# Patient Record
Sex: FEMALE | Race: WHITE | HISPANIC OR LATINO | Employment: OTHER | URBAN - METROPOLITAN AREA
[De-identification: names, ages, dates, MRNs, and addresses within clinical notes are randomized per-mention and may not be internally consistent; named-entity substitution may affect disease eponyms.]

---

## 2017-01-11 ENCOUNTER — TRANSCRIBE ORDERS (OUTPATIENT)
Dept: LAB | Facility: HOSPITAL | Age: 77
End: 2017-01-11

## 2017-01-11 ENCOUNTER — APPOINTMENT (OUTPATIENT)
Dept: LAB | Facility: HOSPITAL | Age: 77
End: 2017-01-11
Attending: INTERNAL MEDICINE
Payer: COMMERCIAL

## 2017-01-11 DIAGNOSIS — Z51.81 ENCOUNTER FOR THERAPEUTIC DRUG MONITORING: ICD-10-CM

## 2017-01-11 DIAGNOSIS — Z51.81 ENCOUNTER FOR THERAPEUTIC DRUG MONITORING: Primary | ICD-10-CM

## 2017-01-11 DIAGNOSIS — E55.9 UNSPECIFIED VITAMIN D DEFICIENCY: ICD-10-CM

## 2017-01-11 LAB
25(OH)D3 SERPL-MCNC: 34.8 NG/ML (ref 30–100)
ALBUMIN SERPL BCP-MCNC: 3.9 G/DL (ref 3.5–5)
ALP SERPL-CCNC: 78 U/L (ref 46–116)
ALT SERPL W P-5'-P-CCNC: 16 U/L (ref 12–78)
ANION GAP SERPL CALCULATED.3IONS-SCNC: 4 MMOL/L (ref 4–13)
AST SERPL W P-5'-P-CCNC: 15 U/L (ref 5–45)
BACTERIA UR QL AUTO: ABNORMAL /HPF
BILIRUB SERPL-MCNC: 0.88 MG/DL (ref 0.2–1)
BILIRUB UR QL STRIP: NEGATIVE
BUN SERPL-MCNC: 21 MG/DL (ref 5–25)
CALCIUM SERPL-MCNC: 9.1 MG/DL (ref 8.3–10.1)
CHLORIDE SERPL-SCNC: 103 MMOL/L (ref 100–108)
CHOLEST SERPL-MCNC: 234 MG/DL (ref 50–200)
CLARITY UR: ABNORMAL
CO2 SERPL-SCNC: 31 MMOL/L (ref 21–32)
COLOR UR: ABNORMAL
CREAT SERPL-MCNC: 1.1 MG/DL (ref 0.6–1.3)
ERYTHROCYTE [DISTWIDTH] IN BLOOD BY AUTOMATED COUNT: 12.9 % (ref 11.6–15.1)
GFR SERPL CREATININE-BSD FRML MDRD: 48.3 ML/MIN/1.73SQ M
GLUCOSE SERPL-MCNC: 92 MG/DL (ref 65–140)
GLUCOSE UR STRIP-MCNC: NEGATIVE MG/DL
HCT VFR BLD AUTO: 41.3 % (ref 34.8–46.1)
HDLC SERPL-MCNC: 61 MG/DL (ref 40–60)
HGB BLD-MCNC: 13.6 G/DL (ref 11.5–15.4)
HGB UR QL STRIP.AUTO: NEGATIVE
KETONES UR STRIP-MCNC: NEGATIVE MG/DL
LDLC SERPL CALC-MCNC: 144 MG/DL (ref 0–100)
LEUKOCYTE ESTERASE UR QL STRIP: ABNORMAL
MAGNESIUM SERPL-MCNC: 1.8 MG/DL (ref 1.6–2.6)
MCH RBC QN AUTO: 31.1 PG (ref 26.8–34.3)
MCHC RBC AUTO-ENTMCNC: 32.9 G/DL (ref 31.4–37.4)
MCV RBC AUTO: 95 FL (ref 82–98)
NITRITE UR QL STRIP: POSITIVE
NON-SQ EPI CELLS URNS QL MICRO: ABNORMAL /HPF
PH UR STRIP.AUTO: 6 [PH] (ref 4.5–8)
PLATELET # BLD AUTO: 240 THOUSANDS/UL (ref 149–390)
PMV BLD AUTO: 11.4 FL (ref 8.9–12.7)
POTASSIUM SERPL-SCNC: 3.6 MMOL/L (ref 3.5–5.3)
PROT SERPL-MCNC: 8.3 G/DL (ref 6.4–8.2)
PROT UR STRIP-MCNC: NEGATIVE MG/DL
RBC # BLD AUTO: 4.37 MILLION/UL (ref 3.81–5.12)
RBC #/AREA URNS AUTO: ABNORMAL /HPF
SODIUM SERPL-SCNC: 138 MMOL/L (ref 136–145)
SP GR UR STRIP.AUTO: 1.02 (ref 1–1.03)
TRIGL SERPL-MCNC: 143 MG/DL
TSH SERPL DL<=0.05 MIU/L-ACNC: 2.57 UIU/ML (ref 0.36–3.74)
UROBILINOGEN UR QL STRIP.AUTO: 1 E.U./DL
WBC # BLD AUTO: 7.2 THOUSAND/UL (ref 4.31–10.16)
WBC #/AREA URNS AUTO: ABNORMAL /HPF

## 2017-01-11 PROCEDURE — 80053 COMPREHEN METABOLIC PANEL: CPT

## 2017-01-11 PROCEDURE — 36415 COLL VENOUS BLD VENIPUNCTURE: CPT

## 2017-01-11 PROCEDURE — 80061 LIPID PANEL: CPT

## 2017-01-11 PROCEDURE — 82306 VITAMIN D 25 HYDROXY: CPT

## 2017-01-11 PROCEDURE — 84443 ASSAY THYROID STIM HORMONE: CPT

## 2017-01-11 PROCEDURE — 83735 ASSAY OF MAGNESIUM: CPT

## 2017-01-11 PROCEDURE — 85027 COMPLETE CBC AUTOMATED: CPT

## 2017-01-11 PROCEDURE — 81001 URINALYSIS AUTO W/SCOPE: CPT | Performed by: INTERNAL MEDICINE

## 2017-01-17 ENCOUNTER — ALLSCRIPTS OFFICE VISIT (OUTPATIENT)
Dept: OTHER | Facility: OTHER | Age: 77
End: 2017-01-17

## 2017-01-20 ENCOUNTER — ALLSCRIPTS OFFICE VISIT (OUTPATIENT)
Dept: OTHER | Facility: OTHER | Age: 77
End: 2017-01-20

## 2017-02-14 ENCOUNTER — ALLSCRIPTS OFFICE VISIT (OUTPATIENT)
Dept: OTHER | Facility: OTHER | Age: 77
End: 2017-02-14

## 2017-03-02 ENCOUNTER — GENERIC CONVERSION - ENCOUNTER (OUTPATIENT)
Dept: OTHER | Facility: OTHER | Age: 77
End: 2017-03-02

## 2017-03-06 ENCOUNTER — GENERIC CONVERSION - ENCOUNTER (OUTPATIENT)
Dept: OTHER | Facility: OTHER | Age: 77
End: 2017-03-06

## 2017-03-13 ENCOUNTER — GENERIC CONVERSION - ENCOUNTER (OUTPATIENT)
Dept: OTHER | Facility: OTHER | Age: 77
End: 2017-03-13

## 2017-04-13 ENCOUNTER — ALLSCRIPTS OFFICE VISIT (OUTPATIENT)
Dept: OTHER | Facility: OTHER | Age: 77
End: 2017-04-13

## 2017-05-11 ENCOUNTER — GENERIC CONVERSION - ENCOUNTER (OUTPATIENT)
Dept: OTHER | Facility: OTHER | Age: 77
End: 2017-05-11

## 2017-05-17 ENCOUNTER — ALLSCRIPTS OFFICE VISIT (OUTPATIENT)
Dept: OTHER | Facility: OTHER | Age: 77
End: 2017-05-17

## 2017-05-25 ENCOUNTER — GENERIC CONVERSION - ENCOUNTER (OUTPATIENT)
Dept: OTHER | Facility: OTHER | Age: 77
End: 2017-05-25

## 2017-06-19 ENCOUNTER — ALLSCRIPTS OFFICE VISIT (OUTPATIENT)
Dept: OTHER | Facility: OTHER | Age: 77
End: 2017-06-19

## 2017-06-27 ENCOUNTER — ALLSCRIPTS OFFICE VISIT (OUTPATIENT)
Dept: OTHER | Facility: OTHER | Age: 77
End: 2017-06-27

## 2017-07-11 ENCOUNTER — TRANSCRIBE ORDERS (OUTPATIENT)
Dept: LAB | Facility: HOSPITAL | Age: 77
End: 2017-07-11

## 2017-07-11 ENCOUNTER — APPOINTMENT (OUTPATIENT)
Dept: LAB | Facility: HOSPITAL | Age: 77
End: 2017-07-11
Attending: INTERNAL MEDICINE
Payer: COMMERCIAL

## 2017-07-11 DIAGNOSIS — Z79.02 LONG TERM CURRENT USE OF ANTITHROMBOTICS/ANTIPLATELETS: ICD-10-CM

## 2017-07-11 DIAGNOSIS — I10 ESSENTIAL HYPERTENSION, BENIGN: ICD-10-CM

## 2017-07-11 DIAGNOSIS — E78.5 HYPERLIPIDEMIA, UNSPECIFIED HYPERLIPIDEMIA TYPE: ICD-10-CM

## 2017-07-11 DIAGNOSIS — I48.91 ATRIAL FIBRILLATION, UNSPECIFIED TYPE (HCC): ICD-10-CM

## 2017-07-11 DIAGNOSIS — Z86.79 HISTORY OF CHRONIC ATRIAL FIBRILLATION: ICD-10-CM

## 2017-07-11 DIAGNOSIS — Z86.79 HISTORY OF CHRONIC ATRIAL FIBRILLATION: Primary | ICD-10-CM

## 2017-07-11 LAB
25(OH)D3 SERPL-MCNC: 34.8 NG/ML (ref 30–100)
ALBUMIN SERPL BCP-MCNC: 3.6 G/DL (ref 3.5–5)
ALP SERPL-CCNC: 69 U/L (ref 46–116)
ALT SERPL W P-5'-P-CCNC: 14 U/L (ref 12–78)
ANION GAP SERPL CALCULATED.3IONS-SCNC: 5 MMOL/L (ref 4–13)
AST SERPL W P-5'-P-CCNC: 14 U/L (ref 5–45)
BASOPHILS # BLD AUTO: 0.03 THOUSANDS/ΜL (ref 0–0.1)
BASOPHILS NFR BLD AUTO: 0 % (ref 0–1)
BILIRUB SERPL-MCNC: 0.53 MG/DL (ref 0.2–1)
BUN SERPL-MCNC: 23 MG/DL (ref 5–25)
CALCIUM SERPL-MCNC: 9 MG/DL (ref 8.3–10.1)
CHLORIDE SERPL-SCNC: 105 MMOL/L (ref 100–108)
CHOLEST SERPL-MCNC: 209 MG/DL (ref 50–200)
CO2 SERPL-SCNC: 29 MMOL/L (ref 21–32)
CREAT SERPL-MCNC: 1.09 MG/DL (ref 0.6–1.3)
CREAT UR-MCNC: 196 MG/DL
EOSINOPHIL # BLD AUTO: 0.08 THOUSAND/ΜL (ref 0–0.61)
EOSINOPHIL NFR BLD AUTO: 1 % (ref 0–6)
ERYTHROCYTE [DISTWIDTH] IN BLOOD BY AUTOMATED COUNT: 13.1 % (ref 11.6–15.1)
GFR SERPL CREATININE-BSD FRML MDRD: 48.8 ML/MIN/1.73SQ M
GLUCOSE P FAST SERPL-MCNC: 83 MG/DL (ref 65–99)
HCT VFR BLD AUTO: 39.2 % (ref 34.8–46.1)
HDLC SERPL-MCNC: 55 MG/DL (ref 40–60)
HGB BLD-MCNC: 12.7 G/DL (ref 11.5–15.4)
LDLC SERPL CALC-MCNC: 133 MG/DL (ref 0–100)
LYMPHOCYTES # BLD AUTO: 1.72 THOUSANDS/ΜL (ref 0.6–4.47)
LYMPHOCYTES NFR BLD AUTO: 25 % (ref 14–44)
MCH RBC QN AUTO: 30.8 PG (ref 26.8–34.3)
MCHC RBC AUTO-ENTMCNC: 32.4 G/DL (ref 31.4–37.4)
MCV RBC AUTO: 95 FL (ref 82–98)
MICROALBUMIN UR-MCNC: 11.1 MG/L (ref 0–20)
MICROALBUMIN/CREAT 24H UR: 6 MG/G CREATININE (ref 0–30)
MONOCYTES # BLD AUTO: 0.53 THOUSAND/ΜL (ref 0.17–1.22)
MONOCYTES NFR BLD AUTO: 8 % (ref 4–12)
NEUTROPHILS # BLD AUTO: 4.52 THOUSANDS/ΜL (ref 1.85–7.62)
NEUTS SEG NFR BLD AUTO: 66 % (ref 43–75)
NRBC BLD AUTO-RTO: 0 /100 WBCS
PLATELET # BLD AUTO: 214 THOUSANDS/UL (ref 149–390)
PMV BLD AUTO: 11.6 FL (ref 8.9–12.7)
POTASSIUM SERPL-SCNC: 3.9 MMOL/L (ref 3.5–5.3)
PROT SERPL-MCNC: 7.7 G/DL (ref 6.4–8.2)
RBC # BLD AUTO: 4.12 MILLION/UL (ref 3.81–5.12)
SODIUM SERPL-SCNC: 139 MMOL/L (ref 136–145)
TRIGL SERPL-MCNC: 103 MG/DL
TSH SERPL DL<=0.05 MIU/L-ACNC: 2.15 UIU/ML (ref 0.36–3.74)
WBC # BLD AUTO: 6.89 THOUSAND/UL (ref 4.31–10.16)

## 2017-07-11 PROCEDURE — 85025 COMPLETE CBC W/AUTO DIFF WBC: CPT

## 2017-07-11 PROCEDURE — 82306 VITAMIN D 25 HYDROXY: CPT

## 2017-07-11 PROCEDURE — 80061 LIPID PANEL: CPT

## 2017-07-11 PROCEDURE — 80053 COMPREHEN METABOLIC PANEL: CPT

## 2017-07-11 PROCEDURE — 36415 COLL VENOUS BLD VENIPUNCTURE: CPT

## 2017-07-11 PROCEDURE — 81001 URINALYSIS AUTO W/SCOPE: CPT | Performed by: INTERNAL MEDICINE

## 2017-07-11 PROCEDURE — 82570 ASSAY OF URINE CREATININE: CPT | Performed by: INTERNAL MEDICINE

## 2017-07-11 PROCEDURE — 82043 UR ALBUMIN QUANTITATIVE: CPT | Performed by: INTERNAL MEDICINE

## 2017-07-11 PROCEDURE — 84443 ASSAY THYROID STIM HORMONE: CPT

## 2017-07-12 LAB
BACTERIA UR QL AUTO: ABNORMAL /HPF
BILIRUB UR QL STRIP: NEGATIVE
CLARITY UR: ABNORMAL
COLOR UR: YELLOW
GLUCOSE UR STRIP-MCNC: NEGATIVE MG/DL
HGB UR QL STRIP.AUTO: NEGATIVE
HYALINE CASTS #/AREA URNS LPF: ABNORMAL /LPF
KETONES UR STRIP-MCNC: NEGATIVE MG/DL
LEUKOCYTE ESTERASE UR QL STRIP: ABNORMAL
NITRITE UR QL STRIP: NEGATIVE
NON-SQ EPI CELLS URNS QL MICRO: ABNORMAL /HPF
PH UR STRIP.AUTO: 7.5 [PH] (ref 4.5–8)
PROT UR STRIP-MCNC: NEGATIVE MG/DL
RBC #/AREA URNS AUTO: ABNORMAL /HPF
SP GR UR STRIP.AUTO: 1.02 (ref 1–1.03)
UROBILINOGEN UR QL STRIP.AUTO: 1 E.U./DL
WBC #/AREA URNS AUTO: ABNORMAL /HPF

## 2017-07-18 ENCOUNTER — ALLSCRIPTS OFFICE VISIT (OUTPATIENT)
Dept: OTHER | Facility: OTHER | Age: 77
End: 2017-07-18

## 2017-08-21 ENCOUNTER — ALLSCRIPTS OFFICE VISIT (OUTPATIENT)
Dept: OTHER | Facility: OTHER | Age: 77
End: 2017-08-21

## 2017-09-19 ENCOUNTER — ALLSCRIPTS OFFICE VISIT (OUTPATIENT)
Dept: OTHER | Facility: OTHER | Age: 77
End: 2017-09-19

## 2017-09-21 ENCOUNTER — ALLSCRIPTS OFFICE VISIT (OUTPATIENT)
Dept: OTHER | Facility: OTHER | Age: 77
End: 2017-09-21

## 2017-09-28 ENCOUNTER — TRANSCRIBE ORDERS (OUTPATIENT)
Dept: ADMINISTRATIVE | Facility: HOSPITAL | Age: 77
End: 2017-09-28

## 2017-09-28 DIAGNOSIS — Z12.31 VISIT FOR SCREENING MAMMOGRAM: Primary | ICD-10-CM

## 2017-10-25 ENCOUNTER — GENERIC CONVERSION - ENCOUNTER (OUTPATIENT)
Dept: OTHER | Facility: OTHER | Age: 77
End: 2017-10-25

## 2017-10-25 DIAGNOSIS — Z12.31 ENCOUNTER FOR SCREENING MAMMOGRAM FOR MALIGNANT NEOPLASM OF BREAST: ICD-10-CM

## 2017-11-27 ENCOUNTER — GENERIC CONVERSION - ENCOUNTER (OUTPATIENT)
Dept: OTHER | Facility: OTHER | Age: 77
End: 2017-11-27

## 2017-12-06 ENCOUNTER — ALLSCRIPTS OFFICE VISIT (OUTPATIENT)
Dept: OTHER | Facility: OTHER | Age: 77
End: 2017-12-06

## 2017-12-06 ENCOUNTER — GENERIC CONVERSION - ENCOUNTER (OUTPATIENT)
Dept: OTHER | Facility: OTHER | Age: 77
End: 2017-12-06

## 2017-12-06 ENCOUNTER — HOSPITAL ENCOUNTER (OUTPATIENT)
Dept: RADIOLOGY | Facility: HOSPITAL | Age: 77
Discharge: HOME | End: 2017-12-06
Payer: COMMERCIAL

## 2017-12-06 DIAGNOSIS — Z12.31 ENCOUNTER FOR SCREENING MAMMOGRAM FOR MALIGNANT NEOPLASM OF BREAST: ICD-10-CM

## 2017-12-06 PROCEDURE — G0202 SCR MAMMO BI INCL CAD: HCPCS

## 2017-12-07 NOTE — PROGRESS NOTES
Assessment    1  Nausea with vomiting (787 01) (R11 2)    Plan  Chronic atrial fibrillation    · Xarelto 15 MG Oral Tablet; Take 1 tablet daily  PMH: Viral bronchitis    · Benzonatate 100 MG Oral Capsule; TAKE 1 CAPSULE 3 TIMES DAILY ASNEEDED    Discussion/Summary    Nausea with vomitingUnclear ddx at this point, but differential could include psychogenic vs IBS vs obstruction  Recommend EGD to rule out mass/stricture if this does not improvePt had colonoscopy recentlyRecommend eating soft foods in the meantime  Can take food diary; also try taking Protonix in the evening  Follow up if symptoms persist or worsen  The patient was counseled regarding impressions  The treatment plan was reviewed with the patient/guardian  The patient/guardian understands and agrees with the treatment plan      Chief Complaint  Nausea and vomiting      History of Present Illness  HPI: Patient is here for new complaint of chronic diarrhea and vomiting  Patient states that since October, she has been having occasional episodes in the morning where she will wake up, usually around 2 or 3 am, and is very nauseous  She then will vomit up clear liquid, sometimes several times  She'll feel improved after vomiting, but will also have loose stools afterward, sometimes for hours  Had one episode in October, and then another in November  Complains of having a lot of dry mouth  Has frequent cough, but says this is not the cause of her vomiting  Also says that when she has these symptoms, her skin will feel cold, though she won't feel cold herself  No new foods or medicines  No constipation  No blood in the stool or vomit  No stomach pain  No fevers or chills  No sick contacts  This is a new issue for her  No known hx of gastric or esophageal cancer  Says she may have had an EGD before, but this was many years ago  Hospital Based Practices Required Assessment:  Pain Assessment  the patient states they do not have pain   (on a scale of 0 to 10, the patient rates the pain at 0 )   Prefered Language is  Italian  Primary Language is  Italian  Review of Systems   Constitutional: No fever, no chills, feels well, no tiredness, no recent weight gain or loss  Respiratory: cough, but-- as noted in HPI  Gastrointestinal: nausea-- and-- vomiting, but-- as noted in HPI,-- no abdominal pain,-- no constipation-- and-- no blood in stools  Genitourinary: no complaints of dysuria, no incontinence, no pelvic pain, no dysmenorrhea, no vaginal discharge or abnormal vaginal bleeding  Active Problems    1  Allergic rhinitis due to pollen (477 0) (J30 1)   2  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)   3  Chronic atrial fibrillation (427 31) (I48 2)   4  Chronic kidney disease, stage 3 (585 3) (N18 3)   5  Cystocele, midline (618 01) (N81 11)   6  Encounter for Holly catheter removal (V53 6) (Z46 6)   7  Encounter for screening mammogram for breast cancer (V76 12) (Z12 31)   8  Fatigue (780 79) (R53 83)   9  GERD without esophagitis (530 81) (K21 9)   10  Hallux abducto valgus, bilateral (735 0) (M20 11,M20 12)   11  History of atrial fibrillation (V12 59) (Z86 79)   12  Hyperlipidemia (272 4) (E78 5)   13  Hypertension (401 9) (I10)   14  Lateral epicondylitis, right elbow (726 32) (M77 11)   15  Left atrial enlargement (429 3) (I51 7)   16  Left renal artery stenosis (440 1) (I70 1)   17  Lipoma of right upper extremity (214 8) (D17 21)   18  Low back pain (724 2) (M54 5)   19  Lump or mass in breast (611 72) (N63 0)   20  Medicare annual wellness visit, initial (V70 0) (Z00 00)   21  Need for prophylactic vaccination and inoculation against influenza (V04 81) (Z23)   22  Need for vaccination with 13-polyvalent pneumococcal conjugate vaccine (V03 82) (Z23)   23  Onychomycosis of toenail (110 1) (B35 1)   24  Osteoarthritis of hip (715 95) (M16 9)   25  Osteoarthrosis, hand (484 94) (M19 049)   26  Osteopenia (423 90) (M12 80)   27   Pain due to onychomycosis of toenails of both feet (110 1,729 5)  (B35 1,M79 675,M79 674)   28  Pernicious anemia (281 0) (D51 0)   29  Right knee pain (719 46) (M25 561)   30  Seasonal affective disorder (296 99) (F33 9)   31  Shortness of breath (786 05) (R06 02)   32  Skin pustule (686 9) (L08 9)   33  Tubulovillous adenoma (229 9) (D36 9)   34  Vaginal wall prolapse (618 00) (N81 10)   35  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)   36  Vitamin B12 deficiency (266 2) (E53 8)    Social History     · Denied: History of Alcohol use   · Denied: History of Drug use   · Housing, household, and economic circumstances (V60 9) (Z59 9)   · Safe   · Never A Smoker  The social history was reviewed and updated today  The social history was reviewed and is unchanged  Current Meds   1  Benicar 40 MG Oral Tablet; TAKE 1 TABLET DAILY; Therapy: 04Dst1204 to (Last Rx:53Hse9801)  Requested for: 95XDM7550 Ordered   2  Benzonatate 100 MG Oral Capsule; TAKE 1 CAPSULE 3 TIMES DAILY AS NEEDED; Therapy: 74RQT4915 to (Evaluate:49Gqq4722)  Requested for: 40Pxb0836; Last Rx:29Onl8818 Ordered   3  Bystolic 5 MG Oral Tablet; Therapy: (Recorded:71Cri5228) to Recorded   4  Calcium 500 +D 500-400 MG-UNIT Oral Tablet; Therapy: (QLSSOKWZ:57EPW1079) to Recorded   5  Ciclopirox 8 % External Solution; APPLY AND GENTLY MASSAGE INTO AFFECTED AREA(S) TWICE DAILY; Therapy: 21UKQ0110 to (Last Rx:31Yzn8176) Ordered   6  Cyanocobalamin 1000 MCG/ML Injection Solution; INJECT 1 ML INTRAMUSCULARLY ONCE A MONTH  Requested for: 02Jis6084; Last Rx:06Vny0401 Ordered   7  Fluticasone Propionate 50 MCG/ACT Nasal Suspension; instill 2 sprays into each nostril once daily; Therapy: 26HCM2438 to (Evaluate:82Soq9597)  Requested for: 38Yka0002; Last Rx:74Uyt1915 Ordered   8  Indapamide 2 5 MG Oral Tablet; TAKE 1 TABLET DAILY; Therapy: 20ZPJ6335 to (Evaluate:73Hgz5668); Last Rx:19Sep2017 Ordered   9  Magnesium Oxide 400 MG Oral Capsule; Take 2 daily;  Therapy: 04UKW8946 to (Last Rx:25Cbv9680)  Requested for: 90FOJ2406 Ordered   10  Magnesium Oxide 400 MG Oral Capsule; TAKE AS DIRECTED; Therapy: 26DGN7931 to (Last Rx:30Mjc0006) Ordered   11  Pantoprazole Sodium 40 MG Oral Tablet Delayed Release; TAKE 1 TABLET BY MOUTH  ONCE DAILY; Therapy: 95TQG2424 to (Evaluate:15Srr2964)  Requested for: 48MLN0603; Last  Rx:19Pfr7254 Ordered   12  Premarin 0 625 MG/GM Vaginal Cream; Aplique un poco de crema en el dedo y eche en  la vagina todas las noches por 2 semanas y despues wilver noche si y Aldean Og no  No use  aplicador; Therapy: 85ZRC2667 to (Last Rx:21Luw5984) Ordered   13  Spironolactone 25 MG Oral Tablet; Therapy: 46Njz7696 to (Last Colt Mark)  Requested for: 78Vur8423 Ordered   14  Verapamil HCl - 120 MG Oral Tablet; 1 tab po qod; Therapy: 06EFB5496 to (Last Rx:28Rqz7520) Ordered   15  Xarelto 15 MG Oral Tablet; Take 1 tablet daily; Therapy: 28NIL9021 to (Evaluate:10Nov2017)  Requested for: 51AYQ6634; Last  Rx:81Idf1675 Ordered   16  ZyrTEC Allergy 10 MG Oral Capsule; take 1 capsule daily; Therapy: 95DQS4327 to (Evaluate:14Oct2017)  Requested for: 91AOJ8807; Last  Rx:31Bbg6155 Ordered    Allergies  1  No Known Drug Allergies  2  Adhesive Tape   3  No Known Environmental Allergies    Vitals   Recorded: 53DGW4896 02:59PM   Temperature 97 8 F   Heart Rate 92   Systolic 788   Diastolic 70   Height 5 ft 2 in   Weight 136 lb 10 94 oz   BMI Calculated 25   BSA Calculated 1 63       Physical Exam   Constitutional  General appearance: No acute distress, well appearing and well nourished  Pulmonary  Respiratory effort: No increased work of breathing or signs of respiratory distress  Auscultation of lungs: Clear to auscultation  Cardiovascular  Auscultation of heart: Normal rate and rhythm, normal S1 and S2, without murmurs  Abdomen  Abdomen: Non-tender, no masses  Liver and spleen: No hepatomegaly or splenomegaly           Attending Note  Attending Note: Attending Note: I discussed the case with the Resident and reviewed the Resident's note,-- I supervised the Resident-- and-- I agree with the Resident management plan as it was presented to me  Level of Participation: I was present in clinic, but did not examine the patient  I agree with the Resident's note  Future Appointments    Date/Time Provider Specialty Site   12/26/2017 10:00 AM [place], PCP Nurse Schedule  [facility] [address] PCP       Signatures   Electronically signed by : Mesha Alegria DO; Dec  6 2017  5:17PM EST                       (Author)    Electronically signed by :  KARLI Sepulveda ; Dec  7 2017 12:51AM EST                       (Co-author)

## 2017-12-08 ENCOUNTER — APPOINTMENT (EMERGENCY)
Dept: RADIOLOGY | Facility: HOSPITAL | Age: 77
End: 2017-12-08
Payer: COMMERCIAL

## 2017-12-08 ENCOUNTER — HOSPITAL ENCOUNTER (OUTPATIENT)
Facility: HOSPITAL | Age: 77
Setting detail: OBSERVATION
Discharge: HOME | End: 2017-12-09
Attending: EMERGENCY MEDICINE | Admitting: INTERNAL MEDICINE
Payer: COMMERCIAL

## 2017-12-08 DIAGNOSIS — R07.9 LEFT SIDED CHEST PAIN: Primary | ICD-10-CM

## 2017-12-08 PROBLEM — I10 ESSENTIAL HYPERTENSION: Chronic | Status: ACTIVE | Noted: 2017-12-08

## 2017-12-08 PROBLEM — K21.9 GERD (GASTROESOPHAGEAL REFLUX DISEASE): Chronic | Status: ACTIVE | Noted: 2017-12-08

## 2017-12-08 PROBLEM — N18.30 STAGE 3 CHRONIC KIDNEY DISEASE (HCC): Chronic | Status: ACTIVE | Noted: 2017-12-08

## 2017-12-08 PROBLEM — R11.2 INTRACTABLE VOMITING WITH NAUSEA: Status: ACTIVE | Noted: 2017-12-08

## 2017-12-08 PROBLEM — M19.90 OSTEOARTHRITIS: Chronic | Status: ACTIVE | Noted: 2017-12-08

## 2017-12-08 PROBLEM — E78.5 HYPERLIPEMIA: Chronic | Status: ACTIVE | Noted: 2017-12-08

## 2017-12-08 PROBLEM — D64.9 ANEMIA: Chronic | Status: ACTIVE | Noted: 2017-12-08

## 2017-12-08 PROBLEM — I48.91 ATRIAL FIBRILLATION (HCC): Chronic | Status: ACTIVE | Noted: 2017-12-08

## 2017-12-08 PROBLEM — D51.0 PERNICIOUS ANEMIA: Chronic | Status: ACTIVE | Noted: 2017-12-08

## 2017-12-08 LAB
ALBUMIN SERPL BCP-MCNC: 4.2 G/DL (ref 3.5–5)
ALP SERPL-CCNC: 69 U/L (ref 46–116)
ALT SERPL W P-5'-P-CCNC: 16 U/L (ref 12–78)
ANION GAP SERPL CALCULATED.3IONS-SCNC: 7 MMOL/L (ref 4–13)
AST SERPL W P-5'-P-CCNC: 19 U/L (ref 5–45)
BASOPHILS # BLD AUTO: 0.03 THOUSANDS/ΜL (ref 0–0.1)
BASOPHILS NFR BLD AUTO: 0 % (ref 0–1)
BILIRUB SERPL-MCNC: 0.7 MG/DL (ref 0.2–1)
BUN SERPL-MCNC: 17 MG/DL (ref 5–25)
CALCIUM SERPL-MCNC: 9.7 MG/DL (ref 8.3–10.1)
CHLORIDE SERPL-SCNC: 103 MMOL/L (ref 100–108)
CO2 SERPL-SCNC: 27 MMOL/L (ref 21–32)
CREAT SERPL-MCNC: 1.26 MG/DL (ref 0.6–1.3)
EOSINOPHIL # BLD AUTO: 0.07 THOUSAND/ΜL (ref 0–0.61)
EOSINOPHIL NFR BLD AUTO: 1 % (ref 0–6)
ERYTHROCYTE [DISTWIDTH] IN BLOOD BY AUTOMATED COUNT: 12.8 % (ref 11.6–15.1)
GFR SERPL CREATININE-BSD FRML MDRD: 41 ML/MIN/1.73SQ M
GLUCOSE SERPL-MCNC: 86 MG/DL (ref 65–140)
HCT VFR BLD AUTO: 40.5 % (ref 34.8–46.1)
HGB BLD-MCNC: 13.6 G/DL (ref 11.5–15.4)
LYMPHOCYTES # BLD AUTO: 1.98 THOUSANDS/ΜL (ref 0.6–4.47)
LYMPHOCYTES NFR BLD AUTO: 26 % (ref 14–44)
MCH RBC QN AUTO: 31.5 PG (ref 26.8–34.3)
MCHC RBC AUTO-ENTMCNC: 33.6 G/DL (ref 31.4–37.4)
MCV RBC AUTO: 94 FL (ref 82–98)
MONOCYTES # BLD AUTO: 0.64 THOUSAND/ΜL (ref 0.17–1.22)
MONOCYTES NFR BLD AUTO: 8 % (ref 4–12)
NEUTROPHILS # BLD AUTO: 4.94 THOUSANDS/ΜL (ref 1.85–7.62)
NEUTS SEG NFR BLD AUTO: 65 % (ref 43–75)
NRBC BLD AUTO-RTO: 0 /100 WBCS
PLATELET # BLD AUTO: 274 THOUSANDS/UL (ref 149–390)
PMV BLD AUTO: 10.7 FL (ref 8.9–12.7)
POTASSIUM SERPL-SCNC: 3.4 MMOL/L (ref 3.5–5.3)
PROT SERPL-MCNC: 8.8 G/DL (ref 6.4–8.2)
RBC # BLD AUTO: 4.32 MILLION/UL (ref 3.81–5.12)
SODIUM SERPL-SCNC: 137 MMOL/L (ref 136–145)
TROPONIN I SERPL-MCNC: 0.05 NG/ML
TROPONIN I SERPL-MCNC: <0.02 NG/ML
WBC # BLD AUTO: 7.68 THOUSAND/UL (ref 4.31–10.16)

## 2017-12-08 PROCEDURE — 85025 COMPLETE CBC W/AUTO DIFF WBC: CPT | Performed by: EMERGENCY MEDICINE

## 2017-12-08 PROCEDURE — 80053 COMPREHEN METABOLIC PANEL: CPT | Performed by: EMERGENCY MEDICINE

## 2017-12-08 PROCEDURE — 93005 ELECTROCARDIOGRAM TRACING: CPT | Performed by: EMERGENCY MEDICINE

## 2017-12-08 PROCEDURE — 84484 ASSAY OF TROPONIN QUANT: CPT | Performed by: EMERGENCY MEDICINE

## 2017-12-08 PROCEDURE — 36415 COLL VENOUS BLD VENIPUNCTURE: CPT

## 2017-12-08 PROCEDURE — 84484 ASSAY OF TROPONIN QUANT: CPT | Performed by: INTERNAL MEDICINE

## 2017-12-08 PROCEDURE — 71020 HB CHEST X-RAY 2VW FRONTAL&LATL: CPT

## 2017-12-08 PROCEDURE — 99285 EMERGENCY DEPT VISIT HI MDM: CPT

## 2017-12-08 RX ORDER — LIDOCAINE 50 MG/G
1 PATCH TOPICAL DAILY
Status: DISCONTINUED | OUTPATIENT
Start: 2017-12-09 | End: 2017-12-09 | Stop reason: HOSPADM

## 2017-12-08 RX ORDER — INDAPAMIDE 2.5 MG/1
2.5 TABLET, FILM COATED ORAL EVERY MORNING
Status: DISCONTINUED | OUTPATIENT
Start: 2017-12-09 | End: 2017-12-09 | Stop reason: HOSPADM

## 2017-12-08 RX ORDER — SPIRONOLACTONE 25 MG/1
25 TABLET ORAL DAILY
Status: DISCONTINUED | OUTPATIENT
Start: 2017-12-09 | End: 2017-12-09 | Stop reason: HOSPADM

## 2017-12-08 RX ORDER — OLMESARTAN MEDOXOMIL 20 MG/1
40 TABLET ORAL
Status: DISCONTINUED | OUTPATIENT
Start: 2017-12-09 | End: 2017-12-09 | Stop reason: HOSPADM

## 2017-12-08 RX ORDER — POTASSIUM CHLORIDE 20 MEQ/1
40 TABLET, EXTENDED RELEASE ORAL ONCE
Status: COMPLETED | OUTPATIENT
Start: 2017-12-08 | End: 2017-12-08

## 2017-12-08 RX ORDER — FLUTICASONE PROPIONATE 50 MCG
2 SPRAY, SUSPENSION (ML) NASAL DAILY
Status: DISCONTINUED | OUTPATIENT
Start: 2017-12-09 | End: 2017-12-09 | Stop reason: HOSPADM

## 2017-12-08 RX ORDER — ACETAMINOPHEN 325 MG/1
650 TABLET ORAL EVERY 6 HOURS PRN
Status: DISCONTINUED | OUTPATIENT
Start: 2017-12-08 | End: 2017-12-09 | Stop reason: HOSPADM

## 2017-12-08 RX ORDER — NITROGLYCERIN 0.4 MG/1
0.4 TABLET SUBLINGUAL ONCE
Status: COMPLETED | OUTPATIENT
Start: 2017-12-08 | End: 2017-12-08

## 2017-12-08 RX ORDER — MULTIVIT-MIN/IRON/FOLIC ACID/K 18-600-40
CAPSULE ORAL
Status: ON HOLD | COMMUNITY
End: 2018-05-22 | Stop reason: ALTCHOICE

## 2017-12-08 RX ORDER — ONDANSETRON 2 MG/ML
4 INJECTION INTRAMUSCULAR; INTRAVENOUS EVERY 6 HOURS PRN
Status: DISCONTINUED | OUTPATIENT
Start: 2017-12-08 | End: 2017-12-09 | Stop reason: HOSPADM

## 2017-12-08 RX ORDER — PANTOPRAZOLE SODIUM 40 MG/1
40 TABLET, DELAYED RELEASE ORAL
Status: DISCONTINUED | OUTPATIENT
Start: 2017-12-08 | End: 2017-12-09 | Stop reason: HOSPADM

## 2017-12-08 RX ORDER — FLUTICASONE PROPIONATE 50 MCG
2 SPRAY, SUSPENSION (ML) NASAL DAILY
COMMUNITY
End: 2018-02-21 | Stop reason: ALTCHOICE

## 2017-12-08 RX ORDER — NEBIVOLOL 5 MG/1
5 TABLET ORAL DAILY
Status: DISCONTINUED | OUTPATIENT
Start: 2017-12-09 | End: 2017-12-09 | Stop reason: HOSPADM

## 2017-12-08 RX ORDER — CETIRIZINE HYDROCHLORIDE 10 MG/1
10 TABLET ORAL DAILY
Status: ON HOLD | COMMUNITY
End: 2018-04-16

## 2017-12-08 RX ORDER — VERAPAMIL HYDROCHLORIDE 120 MG/1
120 TABLET, FILM COATED ORAL EVERY OTHER DAY
Status: DISCONTINUED | OUTPATIENT
Start: 2017-12-09 | End: 2017-12-09 | Stop reason: HOSPADM

## 2017-12-08 RX ORDER — LIDOCAINE 50 MG/G
1 PATCH TOPICAL DAILY
Status: DISCONTINUED | OUTPATIENT
Start: 2017-12-09 | End: 2017-12-08 | Stop reason: CLARIF

## 2017-12-08 RX ORDER — ASPIRIN 81 MG/1
324 TABLET, CHEWABLE ORAL ONCE
Status: COMPLETED | OUTPATIENT
Start: 2017-12-08 | End: 2017-12-08

## 2017-12-08 RX ADMIN — NITROGLYCERIN 0.4 MG: 0.4 TABLET SUBLINGUAL at 16:45

## 2017-12-08 RX ADMIN — POTASSIUM CHLORIDE 40 MEQ: 1500 TABLET, EXTENDED RELEASE ORAL at 23:02

## 2017-12-08 RX ADMIN — ASPIRIN 81 MG 324 MG: 81 TABLET ORAL at 16:45

## 2017-12-08 NOTE — CONSULTS
Pt was called in to [facility] to see pt   She reported to f/u with Dr Samir Salguero and Dr Daylni Ackerman at [address] [place] [address]  Called and d/w SOD medicine on call team - they will see patient

## 2017-12-08 NOTE — ED ATTENDING ATTESTATION
Papito Blevins MD, saw and evaluated the patient  I have discussed the patient with the resident/non-physician practitioner and agree with the resident's/non-physician practitioner's findings, Plan of Care, and MDM as documented in the resident's/non-physician practitioner's note, except where noted  All available labs and Radiology studies were reviewed  At this point I agree with the current assessment done in the Emergency Department  I have conducted an independent evaluation of this patient a history and physical is as follows:      Critical Care Time  CritCare Time    67 yo female with hx of afib on xarelto, pacemaker, htn, gerd, hld, c/o chest pain while sitting at [employer]  No n/v, no diaphoresis  Pt with persistent pain  No abdominal pain, no radiation of pain  Vss, afebrile, lungs cta, rrr, abdomen soft nontender, tender at site of pacemaker, no cellulitis, no drainage  Cardiac workup, ekg paced rhythm, cxr, labs

## 2017-12-08 NOTE — ED PROVIDER NOTES
History  Chief Complaint   Patient presents with    Chest Pain     Pt indicated that she has a pacemaker and c/o "pain around my pacemaker"  Pt c/o vomiting earlier in the week  Pt denies SOB      Patient is a 68year-old female with a history of atrial fibrillation on xarelto, pacemaker, HTN, chronic nausea/vomiting/diarrhea of unknown etiology who presents with sudden-onset left-sided chest pain  Hip pain started suddenly while she was sitting in [employer]  is a sharp, stabbing pain in her left anterior chest that radiates to her back  It is not associated with exertion, certain position, or with deep breathing  It was not associated with nausea, vomiting, or diaphoresis  She has never had similar symptoms in the past   She did not take anything at home for the pain  She denies headache, dizziness, fever/chills, sore throat, cough, shortness of breath, abdominal pain, diarrhea, constipation, blood in stool, dysuria, leg swelling  Prior to Admission Medications   Prescriptions Last Dose Informant Patient Reported? Taking? Calcium Carb-Cholecalciferol (CALCIUM 500+D3) 500-400 MG-UNIT TABS   Yes Yes   Sig: Take by mouth   Cyanocobalamin 1000 MCG/ML KIT   Yes Yes   Sig: Inject 1 mL as directed every 30 (thirty) days   acetaminophen (TYLENOL) 325 mg tablet   No Yes   Si mg every 4 hours as needed   cetirizine (ZyrTEC) 10 mg tablet   Yes Yes   Sig: Take 10 mg by mouth daily   conjugated estrogens (PREMARIN) vaginal cream   Yes Yes   Sig: Insert into the vagina daily   fluticasone (FLONASE) 50 mcg/act nasal spray   Yes Yes   Si sprays into each nostril daily   indapamide (LOZOL) 2 5 mg tablet   Yes Yes   Sig: Take 2 5 mg by mouth every morning    magnesium oxide (MAG-OX) 400 mg   Yes No   Sig: Take 800 mg by mouth daily     nebivolol (BYSTOLIC) 5 mg tablet   Yes Yes   Sig: Take 5 mg by mouth daily  olmesartan (BENICAR) 40 mg tablet   Yes Yes   Sig: Take 40 mg by mouth daily at bedtime  pantoprazole (PROTONIX) 40 mg tablet   Yes No   Sig: Take 40 mg by mouth daily at bedtime     rivaroxaban (XARELTO) tablet   Yes Yes   Sig: Take 15 mg by mouth daily     spironolactone (ALDACTONE) 25 mg tablet   Yes Yes   Sig: Take 25 mg by mouth daily  verapamil (CALAN) 120 mg tablet   Yes Yes   Sig: Take 120 mg by mouth every other day  Facility-Administered Medications: None       Past Medical History:   Diagnosis Date    A-fib ([address] )     Anemia     Arthritis     Heart murmur     High cholesterol     Hx of long term use of blood thinners     Hypertension     Irregular heart beat     Pacemaker     Shortness of breath        Past Surgical History:   Procedure Laterality Date    CARDIAC PACEMAKER PLACEMENT      1996 and april 2009    CARDIAC PACEMAKER PLACEMENT      CATARACT EXTRACTION W/ INTRAOCULAR LENS  IMPLANT, BILATERAL      COLONOSCOPY      LEG SURGERY Right     as a child after a fall    MO COMBINED ANT/POST COLPORRHAPHY N/A 3/17/2016    Procedure: COLPORRHAPHY ANTERIOR POSTERIOR ;  Surgeon: Sirisha Rodriguez MD;  [facility];  Service: Gynecology    MO CYSTOURETHROSCOPY N/A 3/17/2016    Procedure: Maxwell Cielo;  Surgeon: Sirisha Rodriguez MD;  [facility];  Service: Gynecology    MO REVAGINAL PROLAPSE,SACROSP LIG N/A 3/17/2016    Procedure: COLPOPEXY VAGINAL EXTRAPERITONEAL (VEC) ANTERIOR ;  Surgeon: Sirisha Rodriguez MD;  [facility];  Service: Gynecology    MO SLING OPER STRES INCONTINENCE N/A 3/17/2016    Procedure: INSERTION PUBOVAGINAL SLING SINGLE INCISION ;  Surgeon: Sirisha Rodriguez MD;  [facility];  Service: Gynecology       No family history on file  I have reviewed and agree with the history as documented  Social History   Substance Use Topics    Smoking status: Never Smoker    Smokeless tobacco: Not on file    Alcohol use No        Review of Systems   Constitutional: Negative for chills and fever     HENT: Negative for congestion and sore throat  Eyes: Negative for visual disturbance  Respiratory: Negative for cough and shortness of breath  Cardiovascular: Positive for chest pain  Negative for palpitations and leg swelling  Gastrointestinal: Negative for abdominal distention, abdominal pain, nausea and vomiting  Genitourinary: Negative for dysuria and hematuria  Musculoskeletal: Negative for neck pain and neck stiffness  Skin: Negative for pallor and rash  Neurological: Negative for dizziness, weakness and headaches  Psychiatric/Behavioral: Negative for confusion  The patient is not nervous/anxious  Physical Exam  ED Triage Vitals [12/08/17 1351]   Temperature Pulse Respirations Blood Pressure SpO2   97 9 °F (36 6 °C) 85 20 167/80 98 %      Temp Source Heart Rate Source Patient Position - Orthostatic VS BP Location FiO2 (%)   Oral Monitor Sitting Left arm --      Pain Score       6           Orthostatic Vital Signs  Vitals:    12/08/17 1630 12/08/17 1730 12/08/17 1815 12/08/17 1915   BP: 151/77 139/72 140/62 131/60   Pulse: 71 70 74 70   Patient Position - Orthostatic VS: Lying Lying         Physical Exam   Constitutional: She is oriented to person, place, and time  She appears well-developed and well-nourished  No distress  HENT:   Head: Normocephalic and atraumatic  Eyes: Conjunctivae are normal  Pupils are equal, round, and reactive to light  Neck: Normal range of motion  Cardiovascular: Normal rate, regular rhythm, normal heart sounds and intact distal pulses  Exam reveals no gallop and no friction rub  No murmur heard  Pulmonary/Chest: Effort normal and breath sounds normal  She exhibits tenderness  She exhibits no crepitus, no edema and no swelling  Abdominal: Soft  Bowel sounds are normal  She exhibits no distension  There is no tenderness  Musculoskeletal: Normal range of motion  She exhibits no edema or deformity     Neurological: She is alert and oriented to person, place, and time  Skin: Skin is warm and dry  No rash noted  No pallor  Psychiatric: She has a normal mood and affect  ED Medications  Medications   diclofenac sodium (VOLTAREN) 1 % topical gel 2 g (not administered)   lidocaine (LIDODERM) 5 % patch 1 patch (not administered)   aspirin chewable tablet 324 mg (324 mg Oral Given 12/8/17 1645)   nitroglycerin (NITROSTAT) SL tablet 0 4 mg (0 4 mg Sublingual Given 12/8/17 1645)       Diagnostic Studies  Results Reviewed     Procedure Component Value Units Date/Time    Troponin I [[id number]]  (Normal) Collected:  12/08/17 1912    Lab Status:  Final result Specimen:  Blood from Arm, Right Updated:  12/08/17 1953     Troponin I <0 02 ng/mL     Narrative:         Siemens Chemistry analyzer 99% cutoff is > 0 04 ng/mL in network labs    o cTnI 99% cutoff is useful only when applied to patients in the clinical setting of myocardial ischemia  o cTnI 99% cutoff should be interpreted in the context of clinical history, ECG findings and possibly cardiac imaging to establish correct diagnosis  o cTnI 99% cutoff may be suggestive but clearly not indicative of a coronary event without the clinical setting of myocardial ischemia  Troponin I [[id number]]  (Abnormal) Collected:  12/08/17 1645    Lab Status:  Final result Specimen:  Blood from Arm, Right Updated:  12/08/17 1722     Troponin I 0 05 (H) ng/mL     Narrative:         Siemens Chemistry analyzer 99% cutoff is > 0 04 ng/mL in network labs    o cTnI 99% cutoff is useful only when applied to patients in the clinical setting of myocardial ischemia  o cTnI 99% cutoff should be interpreted in the context of clinical history, ECG findings and possibly cardiac imaging to establish correct diagnosis  o cTnI 99% cutoff may be suggestive but clearly not indicative of a coronary event without the clinical setting of myocardial ischemia      Comprehensive metabolic panel [[id number]]  (Abnormal) Collected:  12/08/17 1403    Lab Status:  Final result Specimen:  Blood from Arm, Left Updated:  12/08/17 1427     Sodium 137 mmol/L      Potassium 3 4 (L) mmol/L      Chloride 103 mmol/L      CO2 27 mmol/L      Anion Gap 7 mmol/L      BUN 17 mg/dL      Creatinine 1 26 mg/dL      Glucose 86 mg/dL      Calcium 9 7 mg/dL      AST 19 U/L      ALT 16 U/L      Alkaline Phosphatase 69 U/L      Total Protein 8 8 (H) g/dL      Albumin 4 2 g/dL      Total Bilirubin 0 70 mg/dL      eGFR 41 ml/min/1 73sq m     Narrative:         National Kidney Disease Education Program recommendations are as follows:  GFR calculation is accurate only with a steady state creatinine  Chronic Kidney disease less than 60 ml/min/1 73 sq  meters  Kidney failure less than 15 ml/min/1 73 sq  meters  Troponin I [[id number]]  (Normal) Collected:  12/08/17 1403    Lab Status:  Final result Specimen:  Blood from Arm, Left Updated:  12/08/17 1427     Troponin I <0 02 ng/mL     Narrative:         Siemens Chemistry analyzer 99% cutoff is > 0 04 ng/mL in network labs    o cTnI 99% cutoff is useful only when applied to patients in the clinical setting of myocardial ischemia  o cTnI 99% cutoff should be interpreted in the context of clinical history, ECG findings and possibly cardiac imaging to establish correct diagnosis  o cTnI 99% cutoff may be suggestive but clearly not indicative of a coronary event without the clinical setting of myocardial ischemia      CBC and differential [[id number]]  (Normal) Collected:  12/08/17 1403    Lab Status:  Final result Specimen:  Blood from Arm, Left Updated:  12/08/17 1411     WBC 7 68 Thousand/uL      RBC 4 32 Million/uL      Hemoglobin 13 6 g/dL      Hematocrit 40 5 %      MCV 94 fL      MCH 31 5 pg      MCHC 33 6 g/dL      RDW 12 8 %      MPV 10 7 fL      Platelets 483 Thousands/uL      nRBC 0 /100 WBCs      Neutrophils Relative 65 %      Lymphocytes Relative 26 %      Monocytes Relative 8 %      Eosinophils Relative 1 %      Basophils Relative 0 % Neutrophils Absolute 4 94 Thousands/µL      Lymphocytes Absolute 1 98 Thousands/µL      Monocytes Absolute 0 64 Thousand/µL      Eosinophils Absolute 0 07 Thousand/µL      Basophils Absolute 0 03 Thousands/µL                  X-ray chest 2 views   ED Interpretation by Angy Perez MD (12/08 1642)   No acute pulmonary disease, as interpreted independently by me         Final Result by Celina Love DO (12/08 1658)      No active pulmonary disease  Workstation performed: [id number]               Procedures  ECG 12 Lead Documentation  Date/Time: 12/8/2017 7:57 PM  Performed by: Lieutenant Miller  Authorized by: Governor Medeiros     Indications / Diagnosis:  Tachycardia, shortness of breath  ECG reviewed by me, the ED Provider: no    Patient location:  ED  Previous ECG:     Comparison to cardiac monitor: Yes    Interpretation:     Interpretation: abnormal    Rate:     ECG rate:  84    ECG rate assessment: normal    Rhythm:     Rhythm: paced    Pacing:     Capture:  Complete    Type of pacing:  Ventricular  Ectopy:     Ectopy: none            Phone Consults  ED Phone Contact    ED Course  ED Course          HEART Risk Score    Flowsheet Row Most Recent Value   History  0 Filed at: 12/08/2017 1628   ECG  1 Filed at: 12/08/2017 1628   Age  2 Filed at: 12/08/2017 1628   Risk Factors  2 Filed at: 12/08/2017 1628   Troponin  1 Filed at: 12/08/2017 1801   Heart Score Risk Calculator   History  0 Filed at: 12/08/2017 1628   ECG  1 Filed at: 12/08/2017 1628   Age  2 Filed at: 12/08/2017 1628   Risk Factors  2 Filed at: 12/08/2017 1628   Troponin  1 Filed at: 12/08/2017 1801   HEART Score  5 Filed at: 12/08/2017 1628   HEART Score  5 Filed at: 12/08/2017 8028        Identification of Seniors at [address] [place] [address] Most Recent Value   (ISAR) Identification of Seniors at Risk   Before the illness or injury that brought you to the Emergency, did you need someone to help you on a regular basis?   0 Filed at: 12/08/2017 1354   In the last 24 hours, have you needed more help than usual?  0 Filed at: 12/08/2017 1354   Have you been hospitalized for one or more nights during the past 6 months? 0 Filed at: 12/08/2017 1354   In general, do you see well?  0 Filed at: 12/08/2017 1354   In general, do you have serious problems with your memory? 1 Filed at: 12/08/2017 1354   Do you take more than three different medications every day? 1 Filed at: 12/08/2017 1354   ISAR Score  2 Filed at: 12/08/2017 1354                          MDM  Number of Diagnoses or Management Options  Left sided chest pain:   Diagnosis management comments: Patient is a 68year-old female with a history of atrial fibrillation on xarelto, pacemaker, HTN, chronic nausea/vomiting/diarrhea of unknown etiology who presents with sudden-onset left-sided chest pain  Low suspicion by initial H&P (tenderness, occurred at rest) but HEART score 5  EKG shows a paced rhythm: non-diagnostic  Nl CBC, CMP, troponin  Treated with tylenol and sublingual nitroglycerin with complete resolution of symptoms  Troponin bumped to 0 05  Admit to [facility] Time    Disposition  Final diagnoses:   Left sided chest pain     Time reflects when diagnosis was documented in both MDM as applicable and the Disposition within this note     Time User Action Codes Description Comment    12/8/2017  4:30 PM Jocelyn Alainz Add [R07 9] Left sided chest pain       ED Disposition     ED Disposition Condition Comment    Admit  Case was discussed with Dr Carrie Wu and the patient's admission status was agreed to be Admission Status: observation status to the service of Dr Carrie Wu  Follow-up Information    None       Patient's Medications   Discharge Prescriptions    No medications on file     No discharge procedures on file  ED Provider  Attending physically available and evaluated Fabian Babatunde DAVENPORT managed the patient along with the ED Attending      Electronically Signed by         Ingrid Parnell MD  Resident  12/08/17 2000

## 2017-12-08 NOTE — Clinical Note
Case was discussed with Dr Jarocho Boston and the patient's admission status was agreed to be Admission Status: observation status to the service of Dr Gaby Burch

## 2017-12-09 VITALS
RESPIRATION RATE: 16 BRPM | HEIGHT: 62 IN | WEIGHT: 132.94 LBS | SYSTOLIC BLOOD PRESSURE: 124 MMHG | TEMPERATURE: 97.6 F | BODY MASS INDEX: 24.46 KG/M2 | HEART RATE: 79 BPM | DIASTOLIC BLOOD PRESSURE: 58 MMHG | OXYGEN SATURATION: 100 %

## 2017-12-09 LAB
ALBUMIN SERPL BCP-MCNC: 3.2 G/DL (ref 3.5–5)
ALP SERPL-CCNC: 55 U/L (ref 46–116)
ALT SERPL W P-5'-P-CCNC: 12 U/L (ref 12–78)
ANION GAP SERPL CALCULATED.3IONS-SCNC: 7 MMOL/L (ref 4–13)
AST SERPL W P-5'-P-CCNC: 14 U/L (ref 5–45)
BILIRUB SERPL-MCNC: 0.41 MG/DL (ref 0.2–1)
BUN SERPL-MCNC: 18 MG/DL (ref 5–25)
CALCIUM SERPL-MCNC: 9 MG/DL (ref 8.3–10.1)
CHLORIDE SERPL-SCNC: 105 MMOL/L (ref 100–108)
CO2 SERPL-SCNC: 27 MMOL/L (ref 21–32)
CREAT SERPL-MCNC: 1.11 MG/DL (ref 0.6–1.3)
ERYTHROCYTE [DISTWIDTH] IN BLOOD BY AUTOMATED COUNT: 12.9 % (ref 11.6–15.1)
GFR SERPL CREATININE-BSD FRML MDRD: 48 ML/MIN/1.73SQ M
GLUCOSE SERPL-MCNC: 88 MG/DL (ref 65–140)
HCT VFR BLD AUTO: 36.6 % (ref 34.8–46.1)
HGB BLD-MCNC: 11.9 G/DL (ref 11.5–15.4)
MCH RBC QN AUTO: 30.5 PG (ref 26.8–34.3)
MCHC RBC AUTO-ENTMCNC: 32.5 G/DL (ref 31.4–37.4)
MCV RBC AUTO: 94 FL (ref 82–98)
PLATELET # BLD AUTO: 218 THOUSANDS/UL (ref 149–390)
PMV BLD AUTO: 11.3 FL (ref 8.9–12.7)
POTASSIUM SERPL-SCNC: 3.9 MMOL/L (ref 3.5–5.3)
PROT SERPL-MCNC: 7 G/DL (ref 6.4–8.2)
RBC # BLD AUTO: 3.9 MILLION/UL (ref 3.81–5.12)
SODIUM SERPL-SCNC: 139 MMOL/L (ref 136–145)
WBC # BLD AUTO: 6.12 THOUSAND/UL (ref 4.31–10.16)

## 2017-12-09 PROCEDURE — 94760 N-INVAS EAR/PLS OXIMETRY 1: CPT

## 2017-12-09 PROCEDURE — 85027 COMPLETE CBC AUTOMATED: CPT | Performed by: INTERNAL MEDICINE

## 2017-12-09 PROCEDURE — 80053 COMPREHEN METABOLIC PANEL: CPT | Performed by: INTERNAL MEDICINE

## 2017-12-09 RX ORDER — POTASSIUM CHLORIDE 20 MEQ/1
20 TABLET, EXTENDED RELEASE ORAL ONCE
Status: DISCONTINUED | OUTPATIENT
Start: 2017-12-09 | End: 2017-12-09

## 2017-12-09 RX ADMIN — SPIRONOLACTONE 25 MG: 25 TABLET, FILM COATED ORAL at 09:25

## 2017-12-09 RX ADMIN — RIVAROXABAN 15 MG: 15 TABLET, FILM COATED ORAL at 12:44

## 2017-12-09 RX ADMIN — VERAPAMIL HYDROCHLORIDE 120 MG: 120 TABLET ORAL at 09:27

## 2017-12-09 RX ADMIN — NEBIVOLOL HYDROCHLORIDE 5 MG: 5 TABLET ORAL at 09:26

## 2017-12-09 RX ADMIN — Medication 800 MG: at 09:25

## 2017-12-09 RX ADMIN — INDAPAMIDE 2.5 MG: 2.5 TABLET, FILM COATED ORAL at 09:28

## 2017-12-09 RX ADMIN — LIDOCAINE 1 PATCH: 50 PATCH TOPICAL at 09:28

## 2017-12-09 RX ADMIN — FLUTICASONE PROPIONATE 2 SPRAY: 50 SPRAY, METERED NASAL at 09:31

## 2017-12-09 NOTE — CASE MANAGEMENT
Initial Clinical Review    Admission: Date/Time/Statement: 12/8/2017  1721 -- OBS    Orders Placed This Encounter   Procedures    Place in Observation (expected length of stay for this patient is less than two midnights)     Standing Status:   Standing     Number of Occurrences:   1     Order Specific Question:   Admitting Physician     Answer:   Lety Andrew [[id number]]     Order Specific Question:   Level of Care     Answer:   Med Surg [16]       ED: Date/Time/Mode of Arrival:   ED Arrival Information     Expected Arrival Acuity Means of Arrival Escorted By Service Admission Type    - 12/8/2017 13:47 Urgent Walk-In Self General Medicine Urgent    Arrival Complaint    Chest pain          Chief Complaint:   Chief Complaint   Patient presents with    Chest Pain     Pt indicated that she has a pacemaker and c/o "pain around my pacemaker"  Pt c/o vomiting earlier in the week  Pt denies SOB        History of Illness: 68 y o  female with a past medical history of AFib on Xarelto, pacemaker hypertension, GERD (with chronic cough at night/morning), chronic nausea/vomiting/diarrhea (unknown etiology), CKD stage 3, anemia hyperlipidemia, hypertension, osteoarthritis who complains of left-sided chest pain radiating to the back that began at 12:30 a m  Pain started while sitting in [place], gradually onset, lasting 15 minutes, occurring four total times, resolving spontaneously, sharp in quality, 8/10 in intensity  Pain is not worsened with exertion or pleuritic maneuvers, patient cannot think of anything that made the pain worse  Pain is not relieved with rest patient cannot think of anything that makes pain better, aside from mild improvement with nitroglycerin  Patient states that she has never had this pain in her life and has never had an MI in her life  No family history of early heart attacks    Patient denies nausea, vomiting, shortness of breath, abdominal pain, constipation, diarrhea, lightheadedness, dizziness, increased stress/anxiety in her life  She reports compliance with all her medicine and compliance with diet  Pt see cardiologist Dr Florentin Mancuso  Patient previously had pacemaker placed on the left side of chest however it was replaced in 2470, no complications  ED Vital Signs:   ED Triage Vitals [12/08/17 1351]   Temperature Pulse Respirations Blood Pressure SpO2   97 9 °F (36 6 °C) 85 20 167/80 98 %      Temp Source Heart Rate Source Patient Position - Orthostatic VS BP Location FiO2 (%)   Oral Monitor Sitting Left arm --      Pain Score       6        Wt Readings from Last 1 Encounters:   12/08/17 60 3 kg (132 lb 15 oz)       Abnormal Labs/Diagnostic Test Results: CBC, BMP -- WNL  CXR -- No active pulmonary disease    ED Treatment:   Medication Administration from 12/08/2017 1347 to 12/08/2017 2313       Date/Time Order Dose Route Action Action by Comments     12/08/2017 1645 aspirin chewable tablet 324 mg 324 mg Oral Given Margot Richard RN      12/08/2017 1645 nitroglycerin (NITROSTAT) SL tablet 0 4 mg 0 4 mg Sublingual Given Megan Richard RN      12/08/2017 2302 potassium chloride (K-DUR,KLOR-CON) CR tablet 40 mEq 40 mEq Oral Given Ron Young RN           Past Medical/Surgical History:    Active Ambulatory Problems     Diagnosis Date Noted    Intractable vomiting with nausea 12/08/2017     Resolved Ambulatory Problems     Diagnosis Date Noted    Uterine prolapse 03/18/2016    MARY (stress urinary incontinence, female) 03/18/2016     Past Medical History:   Diagnosis Date    A-fib ([address] )     Anemia     Arthritis     Heart murmur     High cholesterol     Hx of long term use of blood thinners     Hypertension     Irregular heart beat     Pacemaker     Shortness of breath        Admitting Diagnosis: Chest pain [R07 9]  Left sided chest pain [R07 9]    Age/Sex: 68 y o  female    Assessment/Plan:   Atypical Chest Pain 2/2 Musculoskeletal Etiology (less likely ACS vs GERD)  Overall, patient's chest pain seems very much musculoskeletal in etiology  Pain is very atypical considering the fact that is not substernal, is not relieved with rest, and is not worse with exertion  Pain was very reproducible with palpation and she said this exact same type of pain she felt when pain started  Also , pain at the same site of previous pacemaker placement   Troponins are are relatively unremarkable, though slightly trending up at 0 05, so will need to rule out ACS, especially given the fact that patient's chest pain was mildly relieved nitroglycerin  Also, given the fact that she has multiple CAD risk factors including hypertension, hyperlipidemia, I think its fair to r/o ACS, but quite unlikely  · EKG in a m  · Tele  · Trend trops  · NTG PRN, monitor pain response  · Cardiac Diet  · Tylenol, Voltaren gel, Lidoderm patch     HTN   Controlled  · continue home indapamide, nebivolol, Olmesartan, Verapamil     GERD  Though pt doesn't report heartburn, she does report chronic night time cough likely 2/2 GERD  · continue with pantoprazole     Hyperlipidemia  · not on any medication at home, despite ASCVD >15%, unclear why, will discuss with pt as [employer] records so indicate she was previously on statin  · last lipid panel showed , HDL of 55, triglyceride of 103 and total cholesterol 2 9     Hx of Afib   · Cont Xarelto  · Rate control as above     CKD III  Current Cr at 1 26  Baseline 1 0-1 2  · Stable  Monitor Clinically  · Avoid systemic NSAIDs     Osteoarthritis  · Tylenol , Voltaren Gel PRN     Pernicious anemia  Hemoglobin 13 6  Patient receives monthly B12 injections, last dose on 11/20/2017    Continue with B12 injections in the outpatient setting     Hypokalemia  · Repleted, check in AM    Admission Orders:  M/S/Tele unit  Telem  Serial troponins  EKG  Cardiac diet  venodynes b/l le    Scheduled Meds:   conjugated estrogens 0 5 g Vaginal Daily   diclofenac sodium 2 g Topical 4x Daily   fluticasone 2 spray Nasal Daily   indapamide 2 5 mg Oral QAM   lidocaine 1 patch Transdermal Daily   magnesium oxide 800 mg Oral Daily   nebivolol 5 mg Oral Daily   olmesartan 40 mg Oral HS   pantoprazole 40 mg Oral HS   rivaroxaban 15 mg Oral Daily   spironolactone 25 mg Oral Daily   verapamil 120 mg Oral Every Other Day     Continuous Infusions:    PRN Meds:   acetaminophen    ondansetron

## 2017-12-09 NOTE — DISCHARGE SUMMARY
[facility] Discharge Summary - Prabha Tripp 68 y o  female MRN: [id number]    [address] [facility]  Room / Bed: 2 211/[facility] 211-01 Encounter: [id number]    BRIEF OVERVIEW    Admitting Provider:  ISABELA Segovia  Discharge Provider: No att  providers found  Primary Care Physician at Discharge: Terri Abdul DO    Discharge To: Home/Self-Care    Admission Date: 12/8/2017     Discharge Date: 12/9/2017  4:12 PM    Primary Discharge Diagnosis  Principal Problem:    Chest pain  Active Problems:    Essential hypertension    GERD (gastroesophageal reflux disease)    Hyperlipemia    Pernicious anemia    Stage 3 chronic kidney disease    Osteoarthritis    Atrial fibrillation ([address] )  Resolved Problems:    * No resolved hospital problems  *      Other Problems Addressed:  None    Consulting Providers   None  Therapeutic Operative Procedures Performed  None  Diagnostic Procedures Performed  Troponin: <0 02 -> 0 05 -> <0 02 -> <0 02  EKG:  Electronic ventricular paced    Discharge Disposition: Home/Self Care  Discharged With Lines: no    Test Results Pending at Discharge: none    Outpatient Follow-Up  yes      Follow up: Terri Abdul DO  Follow up within next: 7-10 days; clinic staff has been tasked    Follow up with consulting providers  yes      Physician name: Etienne Spears MD  Specialty: Cardiology  Follow up within next: 7-14 days     Active Issues Requiring Follow-up   yes     Issue: Chest Pain  Responsible Individual: Etienne Spears MD  What is Needed:  Follow up appointment/Stress test  Follow-up Appointments Arranged: No; patient directed to contact 12/11/2017 to arrange        Code Status: Prior  Advance Directive and Living Will: <no information>  Power of :    POLST:      Medications   Discharge Medication List as of 12/9/2017 12:58 PM      CONTINUE these medications which have NOT CHANGED    Details   Calcium Carb-Cholecalciferol (CALCIUM 500+D3) 500-400 MG-UNIT TABS Take by mouth, Historical Med      cetirizine (ZyrTEC) 10 mg tablet Take 10 mg by mouth daily, Historical Med      Cyanocobalamin 1000 MCG/ML KIT Inject 1 mL as directed every 30 (thirty) days, Historical Med      fluticasone (FLONASE) 50 mcg/act nasal spray 2 sprays into each nostril daily, Historical Med      indapamide (LOZOL) 2 5 mg tablet Take 2 5 mg by mouth every morning , Until Discontinued, Historical Med      magnesium oxide (MAG-OX) 400 mg Take 800 mg by mouth daily  , Historical Med      nebivolol (BYSTOLIC) 5 mg tablet Take 5 mg by mouth daily  , Until Discontinued, Historical Med      olmesartan (BENICAR) 40 mg tablet Take 40 mg by mouth daily at bedtime  , Until Discontinued, Historical Med      pantoprazole (PROTONIX) 40 mg tablet Take 40 mg by mouth daily at bedtime  , Historical Med      rivaroxaban (XARELTO) tablet Take 15 mg by mouth daily  , Historical Med      spironolactone (ALDACTONE) 25 mg tablet Take 25 mg by mouth daily  , Until Discontinued, Historical Med      verapamil (CALAN) 120 mg tablet Take 120 mg by mouth every other day , Until Discontinued, Historical Med      acetaminophen (TYLENOL) 325 mg tablet 650 mg every 4 hours as needed, Print      conjugated estrogens (PREMARIN) vaginal cream Insert into the vagina daily, Historical Med         No medications were changed during admission, and no new medications were started on discharge  Allergies  Allergies   Allergen Reactions    Other Itching     Bandaids, tape    redness     Discharge Diet: cardiac diet  Activity restrictions: As tolerated    [facility]  59-year-old female with past medical history significant for atrial fibrillation on Xarelto and status post pacemaker, hypertension, GERD, chronic kidney disease stage 3 who presented with a complaint of left-sided chest pain radiating to back, nonexertional, and of 1 day's duration with 3 separate episodes that day    At the time of admission, patient's chest pain had resolved, however 2nd troponin was 0 05, prompting admission for ACS rule out  Troponins were trended further, and remained troponins were negative  In addition patient had no further chest pain throughout admission, EKG revealed paced rhythm unchanged from prior EKG, and telemetry was negative for arrhythmia  Patient relayed that she was advised by her outpatient cardiologist to receive stress test, but had not had this scheduled  A discussion was held with the patient at bedside, where patient was given option to remain as inpatient for stress testing, or could contact cardiologist to arrange stress test this week as patient was otherwise stable for discharge  Patient elected to complete stress test as outpatient with cardiologist, and was discharged with instructions to follow up with PCP and to contact his cardiologist on 12/11/2017 to arrange for follow-up and stress testing  Presenting Problem/History of Present Illness  Principal Problem:    Chest pain  Active Problems:    Essential hypertension    GERD (gastroesophageal reflux disease)    Hyperlipemia    Pernicious anemia    Stage 3 chronic kidney disease    Osteoarthritis    Atrial fibrillation ([address] )  Resolved Problems:    * No resolved hospital problems  *    1  Chest pain, likely musculoskeletal: resolved at time of discharge, with evidence of reproducibility on initial examination  Initial troponin negative, however 2nd troponin 0 05, prompting need for admission  Repeat troponin x2 were negative  EKG/telemetry revealing electronic ventricular paced rhythm  Patient had no further chest pain while inpatient  She was discharged with instructions to follow up with PCP and outpatient cardiologist to arrange for stress testing per for blood within 1 week  2  Vomiting with diarrhea:  She reports history of 2 separate episodes of vomiting with diarrhea, with most recent episode approximately 1 5 weeks ago    The most recent episode has resolved  Patient should follow up with PCP regarding this; consider GI consultation as outpatient for further evaluation if recurs  3  Essential hypertension: controlled on home regimen and continued on this  Should continue all medications as outpatient  4  GERD: controlled on PPI and continued while inpatient; patient should continue as outpatient     5  Hyperlipidemia: ASCVD score on admission indicating patient should be on moderate to high intensity statin, however patient presently not on  No clear contraindication from chart review  Will need to be addressed as outpatient  6  CKD stage III: Remained at baseline 1 0-1 2  Should follow up as outpatient  7  Atrial fibrillation: s/p pacemaker placement  Anticoagulated on Xarelto  Rate controlled on Nevibulol and Verapamil and continue while inpatient and should continue on discharge  Should follow up with her outpatient cardiologist, Dr Chon Zamarripa, on discharge  8  Hypokalemia:  Mild, potassium 3 4 on admission  This was repleted, and was 3 9 at time of discharge  9  Osteoarthritis:  Controlled while inpatient, continued Tylenol PRN and should continue his outpatient  10  Pernicious anemia: controlled and with stable Hgb  Receives outpatient B12 injections and should continue  Other Pertinent Test Results  None    Discharge Condition: stable    Discharge  Statement   I spent 30 minutes minutes discharging the patient  This time was spent on the day of discharge  I had direct contact with the patient on the day of discharge  Additional documentation is required if more than 30 minutes were spent on discharge

## 2017-12-09 NOTE — H&P
INTERNAL MEDICINE HISTORY AND PHYSICAL  SPR 2 SOD Team A    NAME: Zaida Veloz  AGE: 68 y o  SEX: female  : 1940   MRN: [id number]  ENCOUNTER: [id number]    DATE: 2017  TIME: 7:02 PM    Primary Care Physician: Cortes Coelho DO  Admitting Provider: Chetna Nugent DO    Atypical Chest Pain 2/2 Musculoskeletal Etiology (less likely ACS vs GERD)  Overall, patient's chest pain seems very much musculoskeletal in etiology  Pain is very atypical considering the fact that is not substernal, is not relieved with rest, and is not worse with exertion  Pain was very reproducible with palpation and she said this exact same type of pain she felt when pain started  Also , pain at the same site of previous pacemaker placement   Troponins are are relatively unremarkable, though slightly trending up at 0 05, so will need to rule out ACS, especially given the fact that patient's chest pain was mildly relieved nitroglycerin  Also, given the fact that she has multiple CAD risk factors including hypertension, hyperlipidemia, I think its fair to r/o ACS, but quite unlikely  · EKG in a m  · Tele  · Trend trops  · NTG PRN, monitor pain response  · Cardiac Diet  · Tylenol, Voltaren gel, Lidoderm patch    HTN   Controlled  · continue home indapamide, nebivolol, Olmesartan, Verapamil    GERD  Though pt doesn't report heartburn, she does report chronic night time cough likely 2/2 GERD  · continue with pantoprazole     Hyperlipidemia  · not on any medication at home, despite ASCVD >15%, unclear why, will discuss with pt as [employer] records so indicate she was previously on statin  · last lipid panel showed , HDL of 55, triglyceride of 103 and total cholesterol 2 9    Hx of Afib   · Cont Xarelto  · Rate control as above    CKD III  Current Cr at 1 26  Baseline 1 0-1 2  · Stable  Monitor Clinically    · Avoid systemic NSAIDs    Osteoarthritis  · Tylenol , Voltaren Gel PRN    Pernicious anemia  Hemoglobin 13 6   Patient receives monthly B12 injections, last dose on 11/20/2017  Continue with B12 injections in the outpatient setting    Hypokalemia  · Repleted, check in AM    Chief complaint: Chest pain    History of Present Illness     Mayra Coughlin is a 68 y o  female with a past medical history of AFib on Xarelto, pacemaker hypertension, GERD (with chronic cough at night/morning), chronic nausea/vomiting/diarrhea (unknown etiology), CKD stage 3, anemia hyperlipidemia, hypertension, osteoarthritis who complains of left-sided chest pain radiating to the back that began at 12:30 a m  Pain started while sitting in [place], gradually onset, lasting 15 minutes, occurring four total times, resolving spontaneously, sharp in quality, 8/10 in intensity  Pain is not worsened with exertion or pleuritic maneuvers, patient cannot think of anything that made the pain worse  Pain is not relieved with rest patient cannot think of anything that makes pain better, aside from mild improvement with nitroglycerin  Patient states that she has never had this pain in her life and has never had an MI in her life  No family history of early heart attacks  Patient denies nausea, vomiting, shortness of breath, abdominal pain, constipation, diarrhea, lightheadedness, dizziness, increased stress/anxiety in her life  She reports compliance with all her medicine and compliance with diet  Pt see cardiologist Dr Demarco Manual  Patient previously had pacemaker placed on the left side of chest however it was replaced in 0901, no complications  When she arrived to the ED, blood pressure was elevated at 167/80 but all other vital signs were unremarkable  CBC and CMP were unremarkable  Initial troponin was negative, follow-up troponin was 0 05  EKG showed ventricular paced rhythm, with no significant abnormalities on wet read  By the time of our encounter, pain had completely resolved and patient was very comfortable and in a splendid mood   She states pain is reproducible after another physician palpated the effected area  Last echocardiogram was in 2016 and showed ejection fraction 55% with moderately dilated atria, but was otherwise unremarkable  Review of Systems   Review of Systems   All other systems reviewed and are negative  See Above    Past Medical History     Past Medical History:   Diagnosis Date    A-fib ([address] )     Anemia     Arthritis     Heart murmur     High cholesterol     Hx of long term use of blood thinners     Hypertension     Irregular heart beat     Pacemaker     Shortness of breath        Past Surgical History     Past Surgical History:   Procedure Laterality Date    CARDIAC PACEMAKER PLACEMENT      1996 and april 2009    CARDIAC PACEMAKER PLACEMENT      CATARACT EXTRACTION W/ INTRAOCULAR LENS  IMPLANT, BILATERAL      COLONOSCOPY      LEG SURGERY Right     as a child after a fall    CO COMBINED ANT/POST COLPORRHAPHY N/A 3/17/2016    Procedure: COLPORRHAPHY ANTERIOR POSTERIOR ;  Surgeon: Tiarra Diana MD;  [facility];  Service: Gynecology    CO CYSTOURETHROSCOPY N/A 3/17/2016    Procedure: Pamela Danalicia;  Surgeon: Tiarra Diana MD;  [facility];  Service: Gynecology    CO REVAGINAL PROLAPSE,SACROSP [address] N/A 3/17/2016    Procedure: COLPOPEXY VAGINAL EXTRAPERITONEAL (VEC) ANTERIOR ;  Surgeon: Tiarra Diana MD;  [facility];  Service: Gynecology    CO SLING OPER STRES INCONTINENCE N/A 3/17/2016    Procedure: INSERTION PUBOVAGINAL SLING SINGLE INCISION ;  Surgeon: Tiarra Diana MD;  [facility];  Service: Gynecology       Social History     History   Alcohol Use No     History   Drug Use No     History   Smoking Status    Never Smoker   Smokeless Tobacco    Not on file       Family History   No family history on file  Medications Prior to Admission     Prior to Admission medications    Medication Sig Start Date End Date Taking?  Authorizing Provider acetaminophen (TYLENOL) 325 mg tablet 650 mg every 4 hours as needed 3/18/16  Yes Jenelle Bates MD   Calcium Carb-Cholecalciferol (CALCIUM 500+D3) 500-400 MG-UNIT TABS Take by mouth   Yes Historical Provider, MD   cetirizine (ZyrTEC) 10 mg tablet Take 10 mg by mouth daily   Yes Historical Provider, MD   conjugated estrogens (PREMARIN) vaginal cream Insert into the vagina daily   Yes Historical Provider, MD   Cyanocobalamin 1000 MCG/ML KIT Inject 1 mL as directed every 30 (thirty) days   Yes Historical Provider, MD   fluticasone (FLONASE) 50 mcg/act nasal spray 2 sprays into each nostril daily   Yes Historical Provider, MD   indapamide (LOZOL) 2 5 mg tablet Take 2 5 mg by mouth every morning  Yes Historical Provider, MD   nebivolol (BYSTOLIC) 5 mg tablet Take 5 mg by mouth daily  Yes Historical Provider, MD   olmesartan (BENICAR) 40 mg tablet Take 40 mg by mouth daily at bedtime  Yes Historical Provider, MD   rivaroxaban (XARELTO) tablet Take 15 mg by mouth daily     Yes Historical Provider, MD   spironolactone (ALDACTONE) 25 mg tablet Take 25 mg by mouth daily  Yes Historical Provider, MD   verapamil (CALAN) 120 mg tablet Take 120 mg by mouth every other day  Yes Historical Provider, MD   magnesium oxide (MAG-OX) 400 mg Take 800 mg by mouth daily      Historical Provider, MD   pantoprazole (PROTONIX) 40 mg tablet Take 40 mg by mouth daily at bedtime      Historical Provider, MD       Allergies     Allergies   Allergen Reactions    Other Itching     Bandaids, tape    redness       Objective     Vitals:    12/08/17 1351 12/08/17 1630 12/08/17 1815   BP: 167/80 151/77 140/62   Pulse: 85 71 74   Resp: 20 18 17   Temp: 97 9 °F (36 6 °C)     TempSrc: Oral     SpO2: 98% 98% 99%   Weight: 61 7 kg (136 lb)     Height: 5' 2" (1 575 m)       Body mass index is 24 87 kg/m²    No intake or output data in the 24 hours ending 12/08/17 1902  Invasive Devices     Peripheral Intravenous Line            Peripheral IV 12/08/17 Right Antecubital less than 1 day          Drain            Urethral Catheter Double-lumen 16 Fr  629 days                Physical Exam  GENERAL: Appears well-developed and well-nourished  Appears in no acute distress   HEENT: Normocephalic and atraumatic  No scleral icterus  PERRLA  EOMI B/L  No oropharyngeal edema  MM moist    NECK: Neck supple with no lymphadenopathy  Trachea midline  No JVD  CARDIOVASCULAR: Moderate tenderness to palpation of left chest S1 and S2 are present  Regular rate and rhythm  No murmurs, rubs, or gallops  RESPIRATORY: CTA B/L, no rales, rhonci or wheezes  Normal respiratory expansion  ABDOMINAL: Bowel sounds present in all 4 quadrants, non-tender, soft, non-distended  No organomegaly, rebound, or guarding  EXTREMITIES: 2+ DP and PT pulses bilaterally; no cyanosis, clubbing, edema  ROM intact  PALACIO x4                   SKIN: Skin is warm and dry  No skin lesions are present  No rashes  Lab Results: I have personally reviewed pertinent reports  CBC:   Results from last 7 days  Lab Units 12/08/17  1403   WBC Thousand/uL 7 68   RBC Million/uL 4 32   HEMOGLOBIN g/dL 13 6   HEMATOCRIT % 40 5   MCV fL 94   MCH pg 31 5   MCHC g/dL 33 6   RDW % 12 8   MPV fL 10 7   PLATELETS Thousands/uL 274   NRBC AUTO /100 WBCs 0   NEUTROS PCT % 65   LYMPHS PCT % 26   MONOS PCT % 8   EOS PCT % 1   BASOS PCT % 0   NEUTROS ABS Thousands/µL 4 94   LYMPHS ABS Thousands/µL 1 98   MONOS ABS Thousand/µL 0 64   EOS ABS Thousand/µL 0 07       Imaging: I have personally reviewed pertinent films in PACS  X-ray Chest 2 Views    Result Date: 12/8/2017  Narrative: CHEST INDICATION:  Pain  COMPARISON:  4/30/2015 VIEWS:  Frontal and lateral projections IMAGES:  2 FINDINGS:  Right chest wall pacemaker unchanged in position  Cardiomediastinal silhouette appears unremarkable  The lungs are clear  No pneumothorax or pleural effusion   Visualized osseous structures appear within normal limits for the patient's age  Impression: No active pulmonary disease  Workstation performed: [id number]     Mammo Screening Bilateral W Cad    Result Date: 12/6/2017  Narrative: Patient History: Patient is postmenopausal  Family history of breast cancer at age 48 in sister  Benign WB stereo breast biopsy of the right breast, October 20, 2014  Pathology Report of both breasts, October 20, 2014  Benign ultrasound-guided core biopsy of the right breast, August 17, 2006  No Hormone Replacement Therapy Patient has never smoked  Patient's BMI is 24 1  Reason for exam: screening, asymptomatic  Mammo Screening Bilateral W CAD: December 6, 2017 - Check In #: [de-identified] Bilateral MLO and CC view(s) were taken  Technologist: RT Truong(R)(M) Prior study comparison: December 5, 2016, mammo diagnostic bilateral W CAD, performed at [address] [place] [address]  December 2, 2015, bilateral digital screening mammogram performed at [address] [place] [address]  There are scattered fibroglandular densities  The parenchymal pattern appears stable  No dominant soft tissue mass or suspicious calcifications are noted  A stable nodular asymmetry is seen in the inferior anterior left breast   Radio opaque post core biopsy clips are seen in the right breast   Vascular calcification is present  A pacemaker is superimposed over the right pectoralis muscle  The skin and nipple contours are within normal limits  IMPRESSION:   No mammographic evidence of malignancy  No significant changes when compared with prior studies  ACR BI-RADS® Assessments: BiRad:2 - Benign Recommendation: Routine screening mammogram in 1 year  Analyzed by CAD The patient is scheduled in a reminder system for screening mammography  8-10% of cancers will be missed on mammography  Management of a palpable abnormality must be based on clinical grounds  Patients will be notified of their results via letter from our facility   Accredited by [employer] of [employer] and FDA  Transcription Location: [facility]: [id number] Risk Value(s): Tyrer-Cuzick 10 Year: 5 300%, Tyrer-Cuzick Lifetime: 5 300%, Myriad Table: 1 5%, JOSEP 5 Year: 4 9%, NCI Lifetime: 9 1%          Urinalysis:       Invalid input(s): URIBILINOGEN     Urine Micro:        EKG, Pathology, and Other Studies: I have personally reviewed pertinent reports  Medications given in Emergency Department     Medication Administration - last 24 hours from 12/07/2017 1902 to 12/08/2017 1902       Date/Time Order Dose Route Action Action by     12/08/2017 1645 aspirin chewable tablet 324 mg 324 mg Oral Given Rocio Richard RN     12/08/2017 1645 nitroglycerin (NITROSTAT) SL tablet 0 4 mg 0 4 mg Sublingual Given Alexei Arnold RN          Assessment and Plan     Code Status: Level 1 - Full Code  VTE Pharmacologic Prophylaxis:Xarelto  VTE Mechanical Prophylaxis: sequential compression device  Admission Status: OBSERVATION    Admission Time  I spent 1 hour admitting the patient  This involved direct patient contact where I performed a full history and physical, reviewing previous records, and reviewing laboratory and other diagnostic studies      Aruna White MD  Internal Medicine  PGY-1

## 2017-12-09 NOTE — PROGRESS NOTES
[facility] Admission Note   Unit/Bed # @DBLINK (SINCERE,44150)@ Encounter: [id number]  SOD Team A          Henry Tate 68 y o  female [id number]       Patient seen and examined  Reviewed H&P per Dr Columba Taveras  Agree with the assessment and plan as stated below  Eddy Arteaga is a 68year old female with a past medical history of hypertension, hyperlipidemia, GERD, anemia, chronic kidney disease stage 3, osteoarthritis who presents with left-sided chest pain that is described as sharp, grade 8/10, radiating to the back, not worsened by breathing  Pain started earlier today and lasted about 15 minutes  She had 3 other episodes of this pain today and they all lasted about 15 minutes to 30 minutes, the last one occurred in the ED and she received sublingual nitroglycerin and pain improved  It is not worsened by movement or by taking deep breaths  There is no associated nausea, vomiting, dizziness, syncope, diaphoresis , shortness of breath or palpitations  Patient admits to a chronic unproductive cough that has been going on for over 1 year - usually worse in the mornings  She denies fever, chills, night sweats, abdominal pain, headache, sore throat, dysuria  She denies any history of trauma, fall, motor vehicle accident  At the time of our interview her pain was completely resolved  She has a positive family history of myocardial infarction in her mother and stroke on her father side( a number of his relatives)  On examination, she is seated comfortably in her bed in no obvious distress at all  Her vital signs are stable and she is satting at 98-99% on room air  Heart sounds S1 and S2, regular rate and rhythm, no murmurs, rubs or gallops  Her lungs are clear to auscultation bilaterally  Her left chest is tender on palpation around the area of her prior pacemaker scar  and she says that this is the same kind of pain she had  Abdominal exam is benign  She has no pedal edema    Initial troponin in the emergency room was less 0 02 but the 2nd one was 0 05  Her CBC and CMP were essentially normal except for a mild hypokalemia of 3 4 and an elevated total protein of 8 8  Her chest x-ray showed no active pulmonary disease  Had EKG showed a paced rhythm and not much different from her previous EKG  Assessment/Plan: Principal Problem:    Chest pain  Active Problems:    Essential hypertension    GERD (gastroesophageal reflux disease)    Hyperlipemia    Pernicious anemia    Stage 3 chronic kidney disease    Osteoarthritis    Atrial fibrillation (HCC)     Chest pain - likely musculoskeletal vs secondary to GERD versus 2/2 ACS  ACS is very low on my differential but because of patient's medical history of hypertension and hyperlipidemia and family history, we will rule out ACS    -  will trend troponins, monitor on telemetry, and repeat an EKG in the morning   - will give Tylenol for pain and Voltaren gel and lidocaine patch prn for musculoskeletal pain    Essential hypertension  - stable  - continue with indapamide, nebivolol, Olmesartan, Verapamil    GERD  - continue with pantoprazole    Hyperlipidemia  - not on any medication at home  - lipid panel done on July 11th, 2017 showed an LDL of 133, HDL of 55, triglycerides of 103 and total cholesterol of 209  - her calculated 10 year ASCVD risk is 26 7  -patient should be on a moderate to high intensity statin - a search of [employer] shows that patient was initially on pravastatin in 2013 and atorvastatin last year  -a discussion should be held with the patient to determine why statins were stopped and the possibility of re-starting a statin     Pernicious anemia  -controlled -hemoglobin today is 13 6  -patient is on monthly vitamin B 12 injections  -last dose was received on 11/20/17  -continue with outpatient monthly vitamin B12 injection    CKD 3  -creatinine today is 1 26 with a GFR of 41 - around her baseline  -avoid nephrotoxic agents    Osteoarthritis  -Tylenol p r n      Atrial fibrillation  -status post pacemaker placement  -continue with Xarelto, Nevibulol  and Verapamil  - Dr Nilesh Robles is her cardilogist    Hypokalemia  -will replete    Will we admit to Dr Mitzi FISHER    Invasive lines - none    DVT PPx - Xarelto and SCDs    Disposition:  OBSERVATION    Expected LOS: <2 [facility] Bhumi, DO

## 2017-12-09 NOTE — DISCHARGE INSTRUCTIONS
Contact your cardiologist, Dr Carlita Krishnan MD, 12/11/2017 to arrange for stress testing  Dolor de pecho   LO QUE NECESITA SABER:   El dolor en el pecho puede ser provocado por wilver variedad de condiciones, algunas no tan serias y otras que son de peligro mortal  Samul Malady ser un síntoma de un problema digestivo, maye la acidez o wilver úlcera estomacal  Un ataque de ansiedad o wilver emoción aime, maye el enojo, también pueden provocar dolor de Casar  Wilver infección, inflamación o fractura en un hueso o cartílago en el pecho podría provocar dolor o molestia  En ocasiones el dolor torácico o la presión en el pecho pueden ser el resultado de maikol circulación de la vince al corazón (angina)  El dolor de pecho también puede ser causado por trastornos potencialmente mortales maye un ataque al corazón o un coágulo de [employer]  INSTRUCCIONES SOBRE EL [facility]ITAL[facility]IA:   Llame al 911 si presenta:   · Usted tiene alguno de los siguientes signos de un ataque cardíaco:      ¨ Estornudos, presión, o dolor en addison pecho que dura mas de 5 minutos o regresa  ¨ Malestar o dolor en addison espalda, gideon, mandíbula, abdomen, o brazo     ¨ Dificultad para respirar    ¨ Náuseas o vómito    ¨ Siente un desvanecimiento o tiene sudores fríos especialmente en el pecho o dificultad para respirar  Busque atención médica de inmediato si:   · La inflamación en addison pecho empeora, aun con tratamiento  · Usted tose o vomita vince  · Juanis heces son negras o tienen vince  · Usted no puede dejar de vomitar o le duele al tragar  Pregúntele a addison Marlon Gambler vitaminas y minerales son adecuados para usted  · Usted tiene preguntas o inquietudes acerca de addison condición o cuidado  Medicamentos:   · Medicamentos,  pueden administrarse para tratar la causa del dolor de pecho  Por ejemplo, analgésicos, medicamentos para la ansiedad o medicamentos para aumentar el flujo de vince al corazón       · No tome ciertos medicamentos sin antes preguntarle a addison médico   Estos incluyen EMANUEL, suplementos vitamínicos o a base de hierbas u hormonas (estrógeno o progestágeno)  · Almedia ben medicamentos maye se le haya indicado  Consulte con addison médico si usted alicia que addison medicamento no le está ayudando o si presenta efectos secundarios  Infórmele si es alérgico a cualquier medicamento  Mantenga wilver lista actualizada de los [place], las vitaminas y los productos herbales que caesar  Incluya los siguientes datos de los medicamentos: cantidad, frecuencia y motivo de administración  Traiga con usted la lista o los envases de la píldoras a ben citas de seguimiento  Lleve la lista de los medicamentos con usted en charlene de wilver emergencia  Programe wilver wade con addison médico dentro de 67 horas o maye se le indique:  Es posible que deba regresar para hacerse más pruebas para encontrar la causa del dolor de [place]  Es probable que lo refieran a un especialista, maye un cardiólogo o un gastroenterólogo  Anote ben preguntas para que se acuerde de hacerlas karly ben visitas  Consejos para vivir saludable:  Los siguientes son consejos generales de renan  Si addison dolor de pecho es causado por un problema cardíaco, addison médico le dará consejos específicos a seguir  · No fume  La nicotina y otros químicos contenidos en los cigarrillos y cigarros pueden causar daño a ben pulmones y el corazón  Pida información a addison médico si usted actualmente fuma y necesita ayuda para dejar de fumar  Los cigarrillos electrónicos o tabaco sin humo todavía contienen nicotina  Consulte con addison médico antes de [employer]  · Consuma wilver variedad de alimentos saludables y bajos en grasas  Los alimentos saludables incluyen frutas, verduras, pan integral, productos lácteos bajos en grasa, frijoles, dilip magras y pescado  Pida más información acerca de wilver dieta saludable para el corazón  · Pregunte acerca de la [place]    Addison médico le dirá cuáles actividades limitar y cuáles evitar  Pregunte cuándo [employer], regresar a roach trabajo y [employer]  Pida más información acerca de un plan de ejercicio adecuado para usted  · Mantenga un peso saludable  Consulte con roach médico cuánto debería pesar  Solicite que lo ayude a crear un plan para bajar de peso si tiene sobrepeso  © 2017 [address] Information is for End User's use only and may not be sold, redistributed or otherwise used for commercial purposes  All illustrations and images included in CareNotes® are the copyrighted property of A D A M , Inc  or Bishop Zacarias  Esta información es sólo para uso en educación  Roach intención no es darle un consejo médico sobre enfermedades o tratamientos  Colsulte con roach Daniel Alma farmacéutico antes de seguir cualquier régimen médico para saber si es seguro y efectivo para usted  Dolor de pecho   LO QUE NECESITA SABER:   ¿Qué provoca el dolor en el pecho? El dolor en el pecho puede ser provocado por wilver variedad de condiciones, algunas no tan serias y otras que son de peligro mortal  El dolor de pecho también puede ser un síntoma de un problema digestivo, maye la acidez o wilver úlcera estomacal  Un ataque de ansiedad o wilver emoción aime, maye el enojo, también pueden provocar dolor de [place]  Wilver infección, inflamación o fractura en un hueso o cartílago en el pecho podría provocar dolor o molestia  En ocasiones el dolor torácico o la presión en el pecho pueden ser el resultado de maikol circulación de la vince al corazón (angina)  El dolor de pecho también puede ser causado por trastornos potencialmente mortales maye un ataque al corazón o un coágulo de [employer]  ¿Cuáles otros síntomas podrían presentarse con el dolor en el pecho?    · Wilver sensación de ardor detrás del esternón    · Ritmo cardíaco acelerado o lento     · Fiebre o sudoración     · Náuseas o vómito     · Dificultad para respirar     · Yahoo o presión que se propaga del pecho a la espalda, mandíbula o brazo     · Sensación de debilidad, cansancio o Navarro Sachs  ¿Cómo se diagnostica la causa del dolor en el pecho? Addison médico lo examinará  Describa addison dolor en el pecho con el akira detalle que sea posible  Dígale dónde le duele y cuándo empezó el dolor  Coméntele si usted nota algo que hace empeorar o mejorar el dolor  Además infórmele si el dolor es harpal o intermitente  Addison médico le preguntará cuáles son los [place] caesar y acerca de cualquier condición médica que tenga  También lo examinará  Es posible que también deba hacerse alguno de los siguientes exámenes:  · Un electrocardiograma  es un examen que registra la actividad eléctrica de addison corazón  · Los análisis de vince:  revisan si hay daños en el corazón y signos de ataque cardíaco      · Un ecocardiograma  [place] ondas de jesús para janie si la vince fluye normalmente a través del corazón  · Un ultrasonido, wilver radiografía, wilver tomografía computarizada o wilver imagen por resonancia magnética (IRM)  podría mostrar la causa de addison dolor de pecho  Es posible que le administren líquido de contraste para que addison corazón se easton mejor en las imágenes  Dígale al médico si usted alguna vez ha tenido wilver reacción alérgica al líquido de Piasa  No entre a la jamal donde se realiza la resonancia magnética con algo de metal  El metal puede causar lesiones serias  Dígale al médico si usted tiene algo de metal por dentro o sobre addison cuerpo  · Wilver endoscopia  puede hacerse para revisar si tiene úlceras o problemas con el esófago  ¿Cómo se trata el dolor en el pecho? Se le podrán administrar medicamentos para tratar la causa del dolor de pecho  Por ejemplo, analgésicos, medicamentos para la ansiedad o medicamentos para aumentar el flujo de vince al corazón   No  tome ciertos medicamentos sin antes preguntarle a addison médico  Estos incluyen EMANUEL, suplementos vitamínicos o a base de hierbas u hormonas (estrógeno o progestágeno)  ¿Cuáles son Brendolyn Dimitrios consejos para vivir wilver jovanny balaji? Los siguientes son consejos generales de renan  Si addison dolor de pecho es causado por un problema cardíaco, addison médico le dará consejos específicos a seguir  · No fume  La nicotina y otros químicos contenidos en los cigarrillos y cigarros pueden causar daño a juanis pulmones y el corazón  Pida información a addison médico si usted actualmente fuma y necesita ayuda para dejar de fumar  Los cigarrillos electrónicos o tabaco sin humo todavía contienen nicotina  Consulte con addison médico antes de [employer]  · Consuma wilver variedad de alimentos saludables y bajos en grasas  Los alimentos saludables incluyen frutas, verduras, pan integral, productos lácteos bajos en grasa, frijoles, dilip magras y pescado  Pida más información acerca de wilver dieta saludable para el corazón  · Pregunte acerca de la Tamásipuszta  Addison médico le dirá cuáles actividades limitar y cuáles evitar  Pregunte cuándo [employer], regresar a addison trabajo y [employer]  Pida más información acerca de un plan de ejercicio adecuado para usted  · Mantenga un peso saludable  Consulte con addison médico cuánto debería pesar  Solicite que lo ayude a crear un plan para bajar de peso si tiene sobrepeso  Llame al 911 si presenta:   · Usted tiene alguno de los siguientes signos de un ataque cardíaco:      ¨ Estornudos, presión, o dolor en addison pecho que dura mas de 5 minutos o regresa  ¨ Malestar o dolor en addison espalda, gideon, mandíbula, abdomen, o brazo     ¨ Dificultad para respirar    ¨ Náuseas o vómito    ¨ Siente un desvanecimiento o tiene sudores fríos especialmente en el pecho o dificultad para respirar  ¿Cuándo curtis buscar atención inmediata? · La inflamación en addison pecho empeora, aun con tratamiento  · Usted tose o vomita vince  · Juanis heces son negras o tienen vince       · Usted no puede dejar de vomitar o le duele al tragar  ¿Cuándo curtis comunicarme con mi médico?   · Usted tiene preguntas o inquietudes acerca de addison condición o cuidado  ACUERDOS SOBRE ADDISON CUIDADO:   Usted tiene el derecho de ayudar a planear addison cuidado  Aprenda todo lo que pueda sobre addison condición y maye darle tratamiento  Discuta ben opciones de tratamiento con ben médicos para decidir el cuidado que usted desea recibir  Usted siempre tiene el derecho de rechazar el tratamiento  Esta información es sólo para uso en educación  Addison intención no es darle un consejo médico sobre enfermedades o tratamientos  Colsulte con addison Cherylyn Plough farmacéutico antes de seguir cualquier régimen médico para saber si es seguro y efectivo para usted  © 2017 [facility] Information is for End User's use only and may not be sold, redistributed or otherwise used for commercial purposes  All illustrations and images included in CareNotes® are the copyrighted property of A ISABELA A KARLI , Inc  or Bishop Zacarias

## 2017-12-09 NOTE — PROGRESS NOTES
Pt is resting in bed and I will continue to monitor, Pt will be discharged this afternoon her ride will not be available until after 3pm

## 2017-12-09 NOTE — PROGRESS NOTES
IM Residency Progress Note   Unit/Bed#: CW2 211-01 Encounter: [id number]  SOD Team A      Ingrid Marin 68 y o  female [id number]    Hospital Stay Days: 0      Assessment/Plan:    Principal Problem:    Chest pain  Active Problems:    Essential hypertension    GERD (gastroesophageal reflux disease)    Hyperlipemia    Pernicious anemia    Stage 3 chronic kidney disease    Osteoarthritis    Atrial fibrillation ([facility] [address] )    1  Chest pain, likely musculoskeletal: resolved, and with repeat troponin x2 negative and EKG/telemetry revealing electronic ventricular paced rhythm  She reports she was to receive stress testing as outpatient via her outpatient cardiologist     -Advised patient to contact outpatient cardiologist at discharge to arrange for stress testing, preferably within one week  -Continue medications for other conditions as noted below  2  Vomiting with diarrhea: resolved  If recurs, given 2 prior episodes, consider GI evaluation as outpatient  2  Essential hypertension: controlled on home regimen and will continue  3  GERD: controlled on PPI and will continue  4  Hyperlipidemia: ASCVD score on admission indicating patient should be on moderate to high intensity statin, however patient presently not on  No clear contraindication from chart review  Will need to be addressed as outpatient  5  CKD stage III: at baseline, will monitor periodically  6  Atrial fibrillation: s/p pacemaker placement  Anticoagulated on Xarelto  Rate controlled on Nevibulol and Verapamil and will continue  Should follow up with her outpatient cardiologist, Dr Addison Beverly, on discharge  7  Hypokalemia: resolved, K 3 9 this AM     8  Osteoarthritis: continue Tylenol PRN  9  Pernicious anemia: controlled and with stable Hgb  Receives outpatient B12 injections  Disposition: Discharge to home, will arrange for transition of care visit with PCP  Needs to call cardiologist 12/11/2017 for follow-up           Subjective:   Patient seen and examined at bedside  No acute events overnight reported per nursing during rounds Sabrina White)  Patient reports chest pain has resolved  She reports she was to receive outpatient stress test via Dr Chon Zamarripa (outpatient cardiologist), but had not been contacted by his office  Reports 2 episodes of 1 week history of vomiting with diarrhea, however the second episode has resolved  Denies fever/chills, dyspnea, abdominal pain, leg swelling  Vitals: Temp (24hrs), Av 8 °F (36 6 °C), Min:97 7 °F (36 5 °C), Max:97 9 °F (36 6 °C)  Current: Temperature: 97 7 °F (36 5 °C)  Vitals:    17 2115 17 2245 17 2345 17 0034   BP: 133/63 109/58 115/59    Pulse: 70 96 74    Resp: 22 19 18    Temp:   97 7 °F (36 5 °C)    TempSrc:   Oral    SpO2: 98% 98% 99% 98%   Weight:   60 3 kg (132 lb 15 oz)    Height:   5' 2" (1 575 m)     Body mass index is 24 31 kg/m²  No intake/output data recorded  Physical Exam: General appearance: alert, appears stated age, cooperative and no distress  Head: Normocephalic, without obvious abnormality, atraumatic  Eyes: negative findings: conjunctivae and sclerae normal and pupils equal, round, reactive to light and accomodation  Throat: lips, mucosa, and tongue normal; teeth and gums normal  Lungs: clear to auscultation bilaterally  Heart: regular rate and rhythm, S1, S2 normal, no murmur, click, rub or gallop  Abdomen: soft, non-tender; bowel sounds normal; no masses,  no organomegaly  Extremities: extremities normal, atraumatic, no cyanosis or edema  Skin: Surgical scar in left chest at site of former pacemaker, well-healed      Neurologic: Grossly normal     Invasive Devices     Peripheral Intravenous Line            Peripheral IV 17 Right Antecubital less than 1 day          Drain            Urethral Catheter Double-lumen 16 Fr  630 days                          Labs:   Recent Results (from the past 24 hour(s))   Comprehensive metabolic panel Collection Time: 12/08/17  2:03 PM   Result Value Ref Range    Sodium 137 136 - 145 mmol/L    Potassium 3 4 (L) 3 5 - 5 3 mmol/L    Chloride 103 100 - 108 mmol/L    CO2 27 21 - 32 mmol/L    Anion Gap 7 4 - 13 mmol/L    BUN 17 5 - 25 mg/dL    Creatinine 1 26 0 60 - 1 30 mg/dL    Glucose 86 65 - 140 mg/dL    Calcium 9 7 8 3 - 10 1 mg/dL    AST 19 5 - 45 U/L    ALT 16 12 - 78 U/L    Alkaline Phosphatase 69 46 - 116 U/L    Total Protein 8 8 (H) 6 4 - 8 2 g/dL    Albumin 4 2 3 5 - 5 0 g/dL    Total Bilirubin 0 70 0 20 - 1 00 mg/dL    eGFR 41 ml/min/1 73sq m   CBC and differential    Collection Time: 12/08/17  2:03 PM   Result Value Ref Range    WBC 7 68 4 31 - 10 16 Thousand/uL    RBC 4 32 3 81 - 5 12 Million/uL    Hemoglobin 13 6 11 5 - 15 4 g/dL    Hematocrit 40 5 34 8 - 46 1 %    MCV 94 82 - 98 fL    MCH 31 5 26 8 - 34 3 pg    MCHC 33 6 31 4 - 37 4 g/dL    RDW 12 8 11 6 - 15 1 %    MPV 10 7 8 9 - 12 7 fL    Platelets 526 642 - 731 Thousands/uL    nRBC 0 /100 WBCs    Neutrophils Relative 65 43 - 75 %    Lymphocytes Relative 26 14 - 44 %    Monocytes Relative 8 4 - 12 %    Eosinophils Relative 1 0 - 6 %    Basophils Relative 0 0 - 1 %    Neutrophils Absolute 4 94 1 85 - 7 62 Thousands/µL    Lymphocytes Absolute 1 98 0 60 - 4 47 Thousands/µL    Monocytes Absolute 0 64 0 17 - 1 22 Thousand/µL    Eosinophils Absolute 0 07 0 00 - 0 61 Thousand/µL    Basophils Absolute 0 03 0 00 - 0 10 Thousands/µL   Troponin I    Collection Time: 12/08/17  2:03 PM   Result Value Ref Range    Troponin I <0 02 <=0 04 ng/mL   Troponin I    Collection Time: 12/08/17  4:45 PM   Result Value Ref Range    Troponin I 0 05 (H) <=0 04 ng/mL   Troponin I    Collection Time: 12/08/17  7:12 PM   Result Value Ref Range    Troponin I <0 02 <=0 04 ng/mL   Troponin I    Collection Time: 12/08/17 10:13 PM   Result Value Ref Range    Troponin I <0 02 <=0 04 ng/mL   Comprehensive metabolic panel    Collection Time: 12/09/17  4:30 AM   Result Value Ref Range Sodium 139 136 - 145 mmol/L    Potassium 3 9 3 5 - 5 3 mmol/L    Chloride 105 100 - 108 mmol/L    CO2 27 21 - 32 mmol/L    Anion Gap 7 4 - 13 mmol/L    BUN 18 5 - 25 mg/dL    Creatinine 1 11 0 60 - 1 30 mg/dL    Glucose 88 65 - 140 mg/dL    Calcium 9 0 8 3 - 10 1 mg/dL    AST 14 5 - 45 U/L    ALT 12 12 - 78 U/L    Alkaline Phosphatase 55 46 - 116 U/L    Total Protein 7 0 6 4 - 8 2 g/dL    Albumin 3 2 (L) 3 5 - 5 0 g/dL    Total Bilirubin 0 41 0 20 - 1 00 mg/dL    eGFR 48 ml/min/1 73sq m   CBC (With Platelets)    Collection Time: 12/09/17  4:30 AM   Result Value Ref Range    WBC 6 12 4 31 - 10 16 Thousand/uL    RBC 3 90 3 81 - 5 12 Million/uL    Hemoglobin 11 9 11 5 - 15 4 g/dL    Hematocrit 36 6 34 8 - 46 1 %    MCV 94 82 - 98 fL    MCH 30 5 26 8 - 34 3 pg    MCHC 32 5 31 4 - 37 4 g/dL    RDW 12 9 11 6 - 15 1 %    Platelets 011 851 - 363 Thousands/uL    MPV 11 3 8 9 - 12 7 fL       Radiology Results: I have personally reviewed pertinent reports  and I have personally reviewed pertinent films in PACS      Other Diagnostic Testing:   I have personally reviewed pertinent reports          Active Meds:   Current Facility-Administered Medications   Medication Dose Route Frequency    acetaminophen (TYLENOL) tablet 650 mg  650 mg Oral Q6H PRN    conjugated estrogens (PREMARIN) vaginal cream 0 5 g  0 5 g Vaginal Daily    diclofenac sodium (VOLTAREN) 1 % topical gel 2 g  2 g Topical 4x Daily    fluticasone (FLONASE) 50 mcg/act nasal spray 2 spray  2 spray Nasal Daily    indapamide (LOZOL) tablet 2 5 mg  2 5 mg Oral QAM    lidocaine (LIDODERM) 5 % patch 1 patch  1 patch Transdermal Daily    magnesium oxide (MAG-OX) tablet 800 mg  800 mg Oral Daily    nebivolol (BYSTOLIC) tablet 5 mg  5 mg Oral Daily    olmesartan (BENICAR) tablet 40 mg  40 mg Oral HS    ondansetron (ZOFRAN) injection 4 mg  4 mg Intravenous Q6H PRN    pantoprazole (PROTONIX) EC tablet 40 mg  40 mg Oral HS    rivaroxaban (XARELTO) tablet 15 mg  15 mg Oral Daily    spironolactone (ALDACTONE) tablet 25 mg  25 mg Oral Daily    verapamil (CALAN) tablet 120 mg  120 mg Oral Every Other Day         VTE Pharmacologic Prophylaxis: Xarelto  VTE Mechanical Prophylaxis: sequential compression device    Latoya Mead DO  PGY-3 IM

## 2017-12-11 LAB
ATRIAL RATE: 72 BPM
QRS AXIS: -74 DEGREES
QRSD INTERVAL: 174 MS
QT INTERVAL: 444 MS
QTC INTERVAL: 518 MS
T WAVE AXIS: 81 DEGREES
VENTRICULAR RATE: 82 BPM

## 2017-12-14 ENCOUNTER — ALLSCRIPTS OFFICE VISIT (OUTPATIENT)
Dept: OTHER | Facility: OTHER | Age: 77
End: 2017-12-14

## 2017-12-14 DIAGNOSIS — R07.9 CHEST PAIN: ICD-10-CM

## 2017-12-14 DIAGNOSIS — R07.89 OTHER CHEST PAIN: ICD-10-CM

## 2017-12-14 DIAGNOSIS — R19.7 DIARRHEA: ICD-10-CM

## 2017-12-14 DIAGNOSIS — Z86.79 PERSONAL HISTORY OF OTHER DISEASES OF THE CIRCULATORY SYSTEM (CODE): ICD-10-CM

## 2017-12-14 DIAGNOSIS — E87.6 HYPOKALEMIA: ICD-10-CM

## 2017-12-15 ENCOUNTER — APPOINTMENT (OUTPATIENT)
Dept: LAB | Facility: CLINIC | Age: 77
End: 2017-12-15
Payer: COMMERCIAL

## 2017-12-15 ENCOUNTER — TRANSCRIBE ORDERS (OUTPATIENT)
Dept: ADMINISTRATIVE | Facility: HOSPITAL | Age: 77
End: 2017-12-15

## 2017-12-15 DIAGNOSIS — Z86.79 PERSONAL HISTORY OF OTHER DISEASES OF THE CIRCULATORY SYSTEM (CODE): ICD-10-CM

## 2017-12-15 DIAGNOSIS — R07.89 OTHER CHEST PAIN: ICD-10-CM

## 2017-12-15 DIAGNOSIS — Z86.79 PERSONAL HISTORY OF CARDIOVASCULAR DISORDER: Primary | ICD-10-CM

## 2017-12-15 DIAGNOSIS — E87.6 HYPOKALEMIA: ICD-10-CM

## 2017-12-15 LAB — POTASSIUM SERPL-SCNC: 4.3 MMOL/L (ref 3.5–5.3)

## 2017-12-15 PROCEDURE — 36415 COLL VENOUS BLD VENIPUNCTURE: CPT

## 2017-12-15 PROCEDURE — 84132 ASSAY OF SERUM POTASSIUM: CPT

## 2017-12-26 ENCOUNTER — ALLSCRIPTS OFFICE VISIT (OUTPATIENT)
Dept: OTHER | Facility: OTHER | Age: 77
End: 2017-12-26

## 2017-12-28 ENCOUNTER — APPOINTMENT (OUTPATIENT)
Dept: LAB | Facility: CLINIC | Age: 77
End: 2017-12-28
Payer: COMMERCIAL

## 2017-12-28 DIAGNOSIS — E87.6 POTASSIUM DEFICIENCY: Primary | ICD-10-CM

## 2017-12-28 LAB — POTASSIUM SERPL-SCNC: 3.5 MMOL/L (ref 3.5–5.3)

## 2017-12-28 PROCEDURE — 84132 ASSAY OF SERUM POTASSIUM: CPT

## 2017-12-28 PROCEDURE — 36415 COLL VENOUS BLD VENIPUNCTURE: CPT

## 2018-01-01 ENCOUNTER — HOSPITAL ENCOUNTER (OUTPATIENT)
Facility: HOSPITAL | Age: 78
Setting detail: OBSERVATION
Discharge: HOME | End: 2018-01-02
Attending: EMERGENCY MEDICINE | Admitting: INTERNAL MEDICINE
Payer: COMMERCIAL

## 2018-01-01 ENCOUNTER — APPOINTMENT (EMERGENCY)
Dept: RADIOLOGY | Facility: HOSPITAL | Age: 78
End: 2018-01-01
Payer: COMMERCIAL

## 2018-01-01 DIAGNOSIS — R11.2 INTRACTABLE VOMITING WITH NAUSEA, UNSPECIFIED VOMITING TYPE: ICD-10-CM

## 2018-01-01 DIAGNOSIS — K21.9 GASTROESOPHAGEAL REFLUX DISEASE, ESOPHAGITIS PRESENCE NOT SPECIFIED: Chronic | ICD-10-CM

## 2018-01-01 DIAGNOSIS — R07.9 CHEST PAIN: Primary | ICD-10-CM

## 2018-01-01 LAB
ANION GAP SERPL CALCULATED.3IONS-SCNC: 9 MMOL/L (ref 4–13)
ATRIAL RATE: 234 BPM
ATRIAL RATE: 71 BPM
BASOPHILS # BLD AUTO: 0.02 THOUSANDS/ΜL (ref 0–0.1)
BASOPHILS NFR BLD AUTO: 0 % (ref 0–1)
BUN SERPL-MCNC: 28 MG/DL (ref 5–25)
CALCIUM SERPL-MCNC: 9.2 MG/DL (ref 8.3–10.1)
CHLORIDE SERPL-SCNC: 107 MMOL/L (ref 100–108)
CO2 SERPL-SCNC: 26 MMOL/L (ref 21–32)
CREAT SERPL-MCNC: 1.11 MG/DL (ref 0.6–1.3)
EOSINOPHIL # BLD AUTO: 0.03 THOUSAND/ΜL (ref 0–0.61)
EOSINOPHIL NFR BLD AUTO: 0 % (ref 0–6)
ERYTHROCYTE [DISTWIDTH] IN BLOOD BY AUTOMATED COUNT: 13.1 % (ref 11.6–15.1)
GFR SERPL CREATININE-BSD FRML MDRD: 48 ML/MIN/1.73SQ M
GLUCOSE SERPL-MCNC: 99 MG/DL (ref 65–140)
HCT VFR BLD AUTO: 38.3 % (ref 34.8–46.1)
HGB BLD-MCNC: 12.9 G/DL (ref 11.5–15.4)
LYMPHOCYTES # BLD AUTO: 0.58 THOUSANDS/ΜL (ref 0.6–4.47)
LYMPHOCYTES NFR BLD AUTO: 3 % (ref 14–44)
MAGNESIUM SERPL-MCNC: 1.7 MG/DL (ref 1.6–2.6)
MCH RBC QN AUTO: 31.8 PG (ref 26.8–34.3)
MCHC RBC AUTO-ENTMCNC: 33.7 G/DL (ref 31.4–37.4)
MCV RBC AUTO: 94 FL (ref 82–98)
MONOCYTES # BLD AUTO: 1.37 THOUSAND/ΜL (ref 0.17–1.22)
MONOCYTES NFR BLD AUTO: 8 % (ref 4–12)
NEUTROPHILS # BLD AUTO: 15.43 THOUSANDS/ΜL (ref 1.85–7.62)
NEUTS SEG NFR BLD AUTO: 89 % (ref 43–75)
NRBC BLD AUTO-RTO: 0 /100 WBCS
PLATELET # BLD AUTO: 201 THOUSANDS/UL (ref 149–390)
PMV BLD AUTO: 11.6 FL (ref 8.9–12.7)
POTASSIUM SERPL-SCNC: 3.5 MMOL/L (ref 3.5–5.3)
QRS AXIS: -31 DEGREES
QRS AXIS: -54 DEGREES
QRSD INTERVAL: 172 MS
QRSD INTERVAL: 90 MS
QT INTERVAL: 370 MS
QT INTERVAL: 466 MS
QTC INTERVAL: 455 MS
QTC INTERVAL: 503 MS
RBC # BLD AUTO: 4.06 MILLION/UL (ref 3.81–5.12)
SODIUM SERPL-SCNC: 142 MMOL/L (ref 136–145)
SPECIMEN SOURCE: NORMAL
T WAVE AXIS: -7 DEGREES
T WAVE AXIS: 81 DEGREES
TROPONIN I BLD-MCNC: 0 NG/ML (ref 0–0.08)
TROPONIN I SERPL-MCNC: <0.02 NG/ML
VENTRICULAR RATE: 70 BPM
VENTRICULAR RATE: 91 BPM
WBC # BLD AUTO: 17.48 THOUSAND/UL (ref 4.31–10.16)

## 2018-01-01 PROCEDURE — 84484 ASSAY OF TROPONIN QUANT: CPT | Performed by: INTERNAL MEDICINE

## 2018-01-01 PROCEDURE — 83735 ASSAY OF MAGNESIUM: CPT | Performed by: INTERNAL MEDICINE

## 2018-01-01 PROCEDURE — 93005 ELECTROCARDIOGRAM TRACING: CPT | Performed by: EMERGENCY MEDICINE

## 2018-01-01 PROCEDURE — 84484 ASSAY OF TROPONIN QUANT: CPT

## 2018-01-01 PROCEDURE — 99285 EMERGENCY DEPT VISIT HI MDM: CPT

## 2018-01-01 PROCEDURE — 36415 COLL VENOUS BLD VENIPUNCTURE: CPT | Performed by: EMERGENCY MEDICINE

## 2018-01-01 PROCEDURE — 93005 ELECTROCARDIOGRAM TRACING: CPT

## 2018-01-01 PROCEDURE — 93005 ELECTROCARDIOGRAM TRACING: CPT | Performed by: INTERNAL MEDICINE

## 2018-01-01 PROCEDURE — 85025 COMPLETE CBC W/AUTO DIFF WBC: CPT | Performed by: EMERGENCY MEDICINE

## 2018-01-01 PROCEDURE — 80048 BASIC METABOLIC PNL TOTAL CA: CPT | Performed by: EMERGENCY MEDICINE

## 2018-01-01 PROCEDURE — 71046 X-RAY EXAM CHEST 2 VIEWS: CPT

## 2018-01-01 RX ORDER — CALCIUM CARBONATE/VITAMIN D3 250-3.125
1 TABLET ORAL
Status: DISCONTINUED | OUTPATIENT
Start: 2018-01-02 | End: 2018-01-02 | Stop reason: HOSPADM

## 2018-01-01 RX ORDER — FLUTICASONE PROPIONATE 50 MCG
2 SPRAY, SUSPENSION (ML) NASAL DAILY
Status: DISCONTINUED | OUTPATIENT
Start: 2018-01-02 | End: 2018-01-02 | Stop reason: HOSPADM

## 2018-01-01 RX ORDER — LORATADINE 10 MG/1
10 TABLET ORAL DAILY
Status: DISCONTINUED | OUTPATIENT
Start: 2018-01-02 | End: 2018-01-02 | Stop reason: HOSPADM

## 2018-01-01 RX ORDER — POTASSIUM CHLORIDE 20 MEQ/1
40 TABLET, EXTENDED RELEASE ORAL ONCE
Status: COMPLETED | OUTPATIENT
Start: 2018-01-01 | End: 2018-01-01

## 2018-01-01 RX ORDER — NEBIVOLOL 5 MG/1
5 TABLET ORAL DAILY
Status: DISCONTINUED | OUTPATIENT
Start: 2018-01-02 | End: 2018-01-02 | Stop reason: HOSPADM

## 2018-01-01 RX ORDER — PANTOPRAZOLE SODIUM 40 MG/1
40 TABLET, DELAYED RELEASE ORAL
Status: DISCONTINUED | OUTPATIENT
Start: 2018-01-01 | End: 2018-01-02 | Stop reason: HOSPADM

## 2018-01-01 RX ORDER — POTASSIUM CHLORIDE 14.9 MG/ML
20 INJECTION INTRAVENOUS ONCE
Status: COMPLETED | OUTPATIENT
Start: 2018-01-01 | End: 2018-01-02

## 2018-01-01 RX ORDER — INDAPAMIDE 2.5 MG/1
2.5 TABLET, FILM COATED ORAL EVERY MORNING
Status: DISCONTINUED | OUTPATIENT
Start: 2018-01-02 | End: 2018-01-02 | Stop reason: HOSPADM

## 2018-01-01 RX ORDER — MAGNESIUM CARB/ALUMINUM HYDROX 105-160MG
296 TABLET,CHEWABLE ORAL ONCE
Status: COMPLETED | OUTPATIENT
Start: 2018-01-01 | End: 2018-01-01

## 2018-01-01 RX ORDER — OLMESARTAN MEDOXOMIL 20 MG/1
40 TABLET ORAL
Status: DISCONTINUED | OUTPATIENT
Start: 2018-01-01 | End: 2018-01-02 | Stop reason: HOSPADM

## 2018-01-01 RX ORDER — ACETAMINOPHEN 325 MG/1
325 TABLET ORAL EVERY 6 HOURS PRN
Status: DISCONTINUED | OUTPATIENT
Start: 2018-01-01 | End: 2018-01-02 | Stop reason: HOSPADM

## 2018-01-01 RX ORDER — SPIRONOLACTONE 25 MG/1
25 TABLET ORAL DAILY
Status: DISCONTINUED | OUTPATIENT
Start: 2018-01-02 | End: 2018-01-02 | Stop reason: HOSPADM

## 2018-01-01 RX ADMIN — VERAPAMIL HYDROCHLORIDE 120 MG: 120 TABLET, FILM COATED, EXTENDED RELEASE ORAL at 18:07

## 2018-01-01 RX ADMIN — PANTOPRAZOLE SODIUM 40 MG: 40 TABLET, DELAYED RELEASE ORAL at 21:31

## 2018-01-01 RX ADMIN — POTASSIUM CHLORIDE 40 MEQ: 1500 TABLET, EXTENDED RELEASE ORAL at 20:27

## 2018-01-01 RX ADMIN — OLMESARTAN MEDOXOMIL 40 MG: 20 TABLET, FILM COATED ORAL at 21:31

## 2018-01-01 RX ADMIN — MAGESIUM CITRATE 296 ML: 1.75 LIQUID ORAL at 21:32

## 2018-01-01 RX ADMIN — POTASSIUM CHLORIDE 20 MEQ: 200 INJECTION, SOLUTION INTRAVENOUS at 20:24

## 2018-01-01 NOTE — PROGRESS NOTES
[facility] Senior Admission Note   Unit/Bed # @DBLINK ([facility],[address])@ Encounter: [id number]  SOD Team A          Homa Whitt 68 y o  female [id number]       Patient seen and examined  Reviewed H&P per Dr Maura Cloud  Agree with the assessment and plan except NA  Assessment/Plan: Principal Problem:    Chest pain  Active Problems:    Essential hypertension    GERD (gastroesophageal reflux disease)    Hyperlipemia    Stage 3 chronic kidney disease    Osteoarthritis    Atrial fibrillation (HCC)     Briefly, this is a 51-year-old female with history of hypertension, GERD and atrial fibrillation with pacemaker on Xarelto who presents with complaint of chest pain that started while she was at [facility] today  She describes it as a band like pressure  It was associated with nausea, vomiting and diaphoresis  Not made worse with exertion  She had recent admission for very similar complaint about 3 weeks ago and she was discharged with plan to have outpatient stress test performed  That stress test was scheduled for tomorrow morning  She notes that all her symptoms have resolved at the time of the exam     1   Chest pain - atypical chest pain not associated with exertion  This may be secondary to GERD  However, given patient's significant history with hypertension and AFib patient was admitted for observation  EKG was not concerning for any acute ischemic changes  It showed AFib with interventricular conduction delay and left axis deviation  Initial troponin in the ED was negative   -continue to observe  -check troponin  -monitor on telemetry  -lesion we may consider inpatient stress test study    2  Hypertension - currently well controlled  -continue current regimen with indapamide, nebivolol, olmesartan, spironolactone and verapamil    3  Episode of vomiting with abdominal pain - this appears to be chronic problem for the patient she had very similar complaint on last admission    She will likely need to be evaluated by GI as an outpatient for possible EGD  4   Atrial fibrillation status post pacemaker  -continue Xarelto  - continue rate control with nebivolol and verapamil    5  Leukocytosis - WBC count of 17  Patient is afebrile and no signs of active infection  We will monitor closely   -check CBC in a m        Disposition:  OBSERVATION    Expected LOS: <2 [address] [facility],

## 2018-01-01 NOTE — H&P
INTERNAL MEDICINE HISTORY AND PHYSICAL  CW2 217-01 SOD Team A    NAME: Jerry Lockwood  AGE: 68 y o  SEX: female  : 1940   MRN: [id number]  ENCOUNTER: [id number]    DATE: 2018  TIME: 4:16 PM    Primary Care Physician: Khadar Storm DO  Admitting Provider: Galen Pinedo DO    Chief complaint:  Chest pain    History of Present Illness     Jerry Lockwood is a 68 y o  female with a past medical history significant for atrial fibrillation currently on Xarelto for anticoagulation, pacemaker, hypertension, chronic nausea vomiting and diarrhea of an unknown etiology presents to the emergency department for evaluation of sudden onset band like chest pain  Patient states the chest pain began while she was in [place], immediately following an episode of nausea and vomiting  Patient describes the pain as a bandlike pressure that radiates bilaterally across her chest as well as to her back  Pain is not associated with exertion, changes in position or with deep inspiration  She does affirm episodes of nausea vomiting and diarrhea, but is difficult to determine whether not this is just her chronic issues or if this is somehow related to the chest pain  She had similar episode to this earlier in the month and was admitted had a cardiac workup all of which was is equivocal   Patient is due to have a stress test performed tomorrow as an outpatient  While in the emergency department the patient had a CBC which demonstrated a white blood cell count of 17 5 K  patient's BMP was within normal limits  Patient's point of care troponin was 0  Patient had a chest x-ray that was negative for any pulmonary issues  EKG demonstrated atrial fibrillation but otherwise was negative for concerns for any acute cardiac pathology, specifically myocardial infarction  Upon my exam today the patient is entirely asymptomatic    Patient states that this is her 3rd episode of short lived chest pain after having an episode of nausea and vomiting  She is having no pain in the chest or back, her nausea and vomiting have completely resolved  She will be admitted for a monitoring of a morning CBC  One additional set of troponins will be collected at 8 p m  will monitor her vital signs  Review of Systems   Review of Systems   Constitutional: Negative for activity change, appetite change, chills, diaphoresis, fatigue and fever  Respiratory: Positive for chest tightness  Negative for cough, shortness of breath and wheezing  Cardiovascular: Positive for chest pain  Negative for palpitations and leg swelling  Gastrointestinal: Positive for diarrhea, nausea and vomiting  Negative for abdominal distention, abdominal pain, blood in stool and constipation  Genitourinary: Negative for difficulty urinating  Musculoskeletal: Positive for arthralgias and joint swelling  Skin: Negative for color change, pallor, rash and wound  Neurological: Negative for dizziness, syncope, weakness, light-headedness, numbness and headaches  Hematological: Negative for adenopathy  Does not bruise/bleed easily         Past Medical History     Past Medical History:   Diagnosis Date    [facility]     Anemia     Arthritis     Heart murmur     High cholesterol     Hx of long term use of blood thinners     Hypertension     Irregular heart beat     Pacemaker     Shortness of breath        Past Surgical History     Past Surgical History:   Procedure Laterality Date    CARDIAC PACEMAKER PLACEMENT      1996 and april 2009    CARDIAC PACEMAKER PLACEMENT      CATARACT EXTRACTION W/ INTRAOCULAR LENS  IMPLANT, BILATERAL      COLONOSCOPY      LEG SURGERY Right     as a child after a fall    MD COMBINED ANT/POST COLPORRHAPHY N/A 3/17/2016    Procedure: COLPORRHAPHY ANTERIOR POSTERIOR ;  Surgeon: Claudetta Allen, MD;  Location: [facility];  Service: Gynecology    MD CYSTOURETHROSCOPY N/A 3/17/2016    Procedure: Seema Rizzo;  Surgeon: Wayne Armstrong Janet Lenz MD;  [facility];  Service: Gynecology    NJ REVAGINAL PROLAPSE,SACROSP LIG N/A 3/17/2016    Procedure: COLPOPEXY VAGINAL EXTRAPERITONEAL (VEC) ANTERIOR ;  Surgeon: Mickie Vera MD;  [facility];  Service: Gynecology    NJ SLING OPER STRES INCONTINENCE N/A 3/17/2016    Procedure: INSERTION PUBOVAGINAL SLING SINGLE INCISION ;  Surgeon: Mickie Vera MD;  [facility];  Service: Gynecology       Social History     History   Alcohol Use No     History   Drug Use No     History   Smoking Status    Never Smoker   Smokeless Tobacco    Not on file       Family History   History reviewed  No pertinent family history  Medications Prior to Admission     Prior to Admission medications    Medication Sig Start Date End Date Taking? Authorizing Provider   acetaminophen (TYLENOL) 325 mg tablet 650 mg every 4 hours as needed 3/18/16  Yes Lul Pantoja MD   Calcium Carb-Cholecalciferol (CALCIUM 500+D3) 500-400 MG-UNIT TABS Take by mouth   Yes Historical Provider, MD   cetirizine (ZyrTEC) 10 mg tablet Take 10 mg by mouth daily   Yes Historical Provider, MD   conjugated estrogens (PREMARIN) vaginal cream Insert into the vagina daily   Yes Historical Provider, MD   Cyanocobalamin 1000 MCG/ML KIT Inject 1 mL as directed every 30 (thirty) days   Yes Historical Provider, MD   fluticasone (FLONASE) 50 mcg/act nasal spray 2 sprays into each nostril daily   Yes Historical Provider, MD   indapamide (LOZOL) 2 5 mg tablet Take 2 5 mg by mouth every morning  Yes Historical Provider, MD   magnesium oxide (MAG-OX) 400 mg Take 800 mg by mouth daily     Yes Historical Provider, MD   nebivolol (BYSTOLIC) 5 mg tablet Take 5 mg by mouth daily  Yes Historical Provider, MD   olmesartan (BENICAR) 40 mg tablet Take 40 mg by mouth daily at bedtime     Yes Historical Provider, MD   pantoprazole (PROTONIX) 40 mg tablet Take 40 mg by mouth daily at bedtime     Yes Historical Provider, MD rivaroxaban (XARELTO) tablet Take 15 mg by mouth daily     Yes Historical Provider, MD   spironolactone (ALDACTONE) 25 mg tablet Take 25 mg by mouth daily  Yes Historical Provider, MD   verapamil (CALAN) 120 mg tablet Take 120 mg by mouth every other day  Yes Historical Provider, MD       Allergies     Allergies   Allergen Reactions    Other Itching     Bandaids, tape    redness       Objective     Vitals:    01/01/18 1509 01/01/18 1510   BP: 150/66    Pulse: 78    Resp: 16    Temp:  98 6 °F (37 °C)   TempSrc:  Tympanic   SpO2: 99%    Weight: 61 7 kg (136 lb)    Height: 5' 2" (1 575 m)      Body mass index is 24 87 kg/m²  No intake or output data in the 24 hours ending 01/01/18 1616  Invasive Devices     Peripheral Intravenous Line            Peripheral IV 01/01/18 Right Antecubital less than 1 day          Drain            Urethral Catheter Double-lumen 16 Fr  653 days                Physical Exam  GENERAL: Elderly female in no acute distress, resting comfortably in the bed when I entered  Pleasant and conversant  patient's family at bedside  Patient is primarily Portuguese language dependent but is able to converse limitedly in [place]  HEENT: Normocephalic and atraumatic  No scleral icterus  No oropharyngeal edema  MM moist    NECK:  No JVD  CARDIOVASCULAR: S1 and S2 are present  Irregularly irregular rate and rhythm  No murmurs, rubs, or gallops  Pulses are equal in 2+ in all 4 extremities  No carotid bruit auscultated on exam    RESPIRATORY: CTA B/L, no rales, rhonci or wheezes  Normal respiratory expansion  No accessory muscle recruitment, no costal retractions, no tugging   BREAST: Deferred  ABDOMINAL: Bowel sounds present in all 4 quadrants, no tenderness to palpation, soft, non-distended  No organomegaly, rebound, or guarding  EXTREMITIES: 2+ DP and PT pulses bilaterally; no cyanosis, clubbing, edema  ROM intact  PALACIO x4   /GYN: Deferred  RECTAL: Deferred     BACK: No tenderness to palpation  No gross deformities  NEUROLOGIC: Patient is alert and oriented to person, place, and time  No sensory or motor deficits  CN 2-12 grossly intact  Speech fluent  SKIN: Skin is warm and dry  No skin lesions are present  No rashes  PSYCHIATRIC: Normal mood and affect     Lab Results: I have personally reviewed pertinent reports      CBC:   Results from last 7 days  Lab Units 01/01/18  1514   WBC Thousand/uL 17 48*   RBC Million/uL 4 06   HEMOGLOBIN g/dL 12 9   HEMATOCRIT % 38 3   MCV fL 94   MCH pg 31 8   MCHC g/dL 33 7   RDW % 13 1   MPV fL 11 6   PLATELETS Thousands/uL 201   NRBC AUTO /100 WBCs 0   NEUTROS PCT % 89*   LYMPHS PCT % 3*   MONOS PCT % 8   EOS PCT % 0   BASOS PCT % 0   NEUTROS ABS Thousands/µL 15 43*   LYMPHS ABS Thousands/µL 0 58*   MONOS ABS Thousand/µL 1 37*   EOS ABS Thousand/µL 0 03   , Chemistry Profile:   Results from last 7 days  Lab Units 01/01/18  1514   SODIUM mmol/L 142   POTASSIUM mmol/L 3 5   CHLORIDE mmol/L 107   CO2 mmol/L 26   ANION GAP mmol/L 9   BUN mg/dL 28*   CREATININE mg/dL 1 11   GLUCOSE RANDOM mg/dL 99   CALCIUM mg/dL 9 2   EGFR ml/min/1 73sq m 48   , Coagulation Studies:   , Cardiac Studies:   Results from last 7 days  Lab Units 01/01/18  1514   POC TROPONIN I  ng/ml 0 00   , Additional Labs:   , iSTAT CHEM 8:   Results from last 7 days  Lab Units 01/01/18  1514   EGFR ml/min/1 73sq m 48   GLUCOSE RANDOM mg/dL 99   HEMOGLOBIN g/dL 12 9   , ABG:   , Toxicology:   , Last A1C/Lipid Panel/Thyroid Panel:   Lab Results   Component Value Date    HGBA1C 5 6 06/18/2016    HGBA1C 5 8 (H) 07/24/2014    TRIG 103 07/11/2017    TRIG 143 01/11/2017    CHOL 209 (H) 07/11/2017    CHOL 234 (H) 01/11/2017    HDL 55 07/11/2017    HDL 61 (H) 01/11/2017    LDLCALC 133 (H) 07/11/2017    LDLCALC 144 (H) 01/11/2017    [id number] 2 150 07/11/2017       Imaging: I have personally reviewed pertinent films in PACS  Xr Chest 2 Views    Result Date: 1/1/2018  Narrative: CHEST - DUAL ENERGY INDICATION:  Nausea, vomiting and diarrhea COMPARISON:  Chest radiograph 12/8/2017 VIEWS:  PA (including soft tissue/bone algorithms) and lateral projections IMAGES:  4 FINDINGS:  There is a right-sided pacer generator device with intact lead  Cardiomediastinal silhouette appears unremarkable  The lungs are clear  No pneumothorax or pleural effusion  Visualized osseous structures appear within normal limits for the patient's age  Impression: No active pulmonary disease  Workstation performed: [id number]     X-ray Chest 2 Views    Result Date: 12/8/2017  Narrative: CHEST INDICATION:  Pain  COMPARISON:  4/30/2015 VIEWS:  Frontal and lateral projections IMAGES:  2 FINDINGS:  Right chest wall pacemaker unchanged in position  Cardiomediastinal silhouette appears unremarkable  The lungs are clear  No pneumothorax or pleural effusion  Visualized osseous structures appear within normal limits for the patient's age  Impression: No active pulmonary disease  Workstation performed: [id number]     EKG, Pathology, and Other Studies: I have personally reviewed pertinent reports  Medications given in Emergency Department       Assessment and Plan     Problem List     Chest pain    Intractable vomiting with nausea    Essential hypertension (Chronic)    GERD (gastroesophageal reflux disease) (Chronic)    Hyperlipemia (Chronic)    Pernicious anemia (Chronic)    Stage 3 chronic kidney disease (Chronic)    Osteoarthritis (Chronic)    Atrial fibrillation (HCC) (Chronic)        1  Chest pain  Patient affirms a single episode of bandlike pressure sensation across her chest that occurred today  This sensation occurred immediately after the patient had an episode of vomiting while she was in [place]  Patient states that she has had 2 previous episodes in the past the most recent of which occurred at the beginning of December  The patient was admitted and had a comprehensive evaluation    At that time the testing was equivocal but the patient was scheduled for an outpatient stress test (this was to be performed on 1/2)  Given the previous workup and the lack of concerning features, this is extremely unlikely to be cardiac in nature  We will obtain 1 more troponin is a verify that there has been no increase in cardiac enzymes  CBC and BMP will be obtained in the morning to make sure that there is no elevation of the white blood cell count  2   Leukocytosis  Uncertain etiology  Given the chronic nature of the patient's nausea/vomiting/diarrhea I doubt that this is related  My initial impression is that this is likely a stress reaction  Repeat CBC in the morning to verify the white blood cell count is trending in a reassuring direction  Continue to monitor closely for an identifiable source of infection   Continue to monitor vital signs for fever or hypotension  3   Intractable nausea and vomiting, this is a chronic issue for the patient  This episode of nausea vomiting and diarrhea is 1 of the patient's chronic issues  The concerning feature with this is that it was related to a bandlike pressure sensation across the patient's chest   This is the 3rd such episode  Again doubt that this is cardiac in nature but will continue to monitor as above  Continue patient's current home medication regimen focusing on antinausea and vomiting (pantoprazole, magnesium oxide)  Continue to monitor the patient closely for repeat of this symptoms  4   Gastroesophageal reflux disease  This is a chronic issue for the patient  Treatment as outlined above  Continue home medications targeting reflux symptoms (pantoprazole, magnesium oxide)  5   Essential hypertension, chronic, stable  Continue home medications (Bystolic, Benicar, LOZOL, spironolactone, and verapamil )  6  Atrial fibrillation, currently on anticoagulation with Xarelto, Bystolic for rate control        Code Status: Level 1 - Full Code  VTE Pharmacologic Prophylaxis:   Xarelto  VTE Mechanical Prophylaxis: sequential compression device  Admission Status: OBSERVATION    Admission Time  I spent 30 minutes admitting the patient  This involved direct patient contact where I performed a full history and physical, reviewing previous records, and reviewing laboratory and other diagnostic studies      Jorge Luis Del Toro DO  Internal Medicine  PGY-1

## 2018-01-01 NOTE — ED ATTENDING ATTESTATION
Suzanna Hook DO, saw and evaluated the patient  I have discussed the patient with the resident/non-physician practitioner and agree with the resident's/non-physician practitioner's findings, Plan of Care, and MDM as documented in the resident's/non-physician practitioner's note, except where noted  All available labs and Radiology studies were reviewed  At this point I agree with the current assessment done in the Emergency Department  I have conducted an independent evaluation of this patient including a focused history and a physical exam     ED Note - Chalino Trujillo 68 y o  female MRN: [id number]  Unit/Bed#: ED 18 Encounter: [id number]    History of Present Illness   HPI  Chalino Trujillo is a 68 y o  female who presents for evaluation of centrally located, bandlike chest pressure with radiation to the left scapula that onset approx  1 5 hour ago while at [employer]  +nausea with vomiting and diarrhea at home  No shortness of breath, ripping or tearing pain, edema or swelling, dyspnea on exertion  Patient also describes transient malaise with chills  All symptoms onset today and patient states that she was feeling well for the past 2 days  Last episode was similar symptoms approximately 1 month ago when she was admitted to the hospital   Patient is pending a stress test scheduled for tomorrow  No other complaints at this time  REVIEW OF SYSTEMS  See HPI for further details  12 systems reviewed and otherwise negative except as noted  Historical Information     PAST MEDICAL HISTORY  Past Medical History:   Diagnosis Date    A-fib ([address] )     Anemia     Arthritis     Heart murmur     High cholesterol     Hx of long term use of blood thinners     Hypertension     Irregular heart beat     Pacemaker     Shortness of breath        FAMILY HISTORY  No family history on file  SOCIAL HISTORY  Social History     Social History    Marital status:       Spouse name: N/A    Number of children: N/A    Years of education: N/A     Social History Main Topics    Smoking status: Never Smoker    Smokeless tobacco: Not on file    Alcohol use No    Drug use: No    Sexual activity: Not on file     Other Topics Concern    Not on file     Social History Narrative    No narrative on file       SURGICAL HISTORY  Past Surgical History:   Procedure Laterality Date   [address]  and april 2009    CARDIAC PACEMAKER PLACEMENT      CATARACT EXTRACTION W/ INTRAOCULAR LENS  IMPLANT, BILATERAL      COLONOSCOPY      LEG SURGERY Right     as a child after a fall    ND COMBINED ANT/POST COLPORRHAPHY N/A 3/17/2016    Procedure: COLPORRHAPHY ANTERIOR POSTERIOR ;  Surgeon: Celestine Mir MD;  [facility];  Service: Gynecology    ND CYSTOURETHROSCOPY N/A 3/17/2016    Procedure: Alvia Orts;  Surgeon: Celestine Mir MD;  [facility];  Service: Gynecology    ND Dot Offer LIG N/A 3/17/2016    Procedure: COLPOPEXY VAGINAL EXTRAPERITONEAL (VEC) ANTERIOR ;  Surgeon: Celestine Mir MD;  [facility];  Service: Gynecology    ND SLING OPER STRES INCONTINENCE N/A 3/17/2016    Procedure: INSERTION PUBOVAGINAL SLING SINGLE INCISION ;  Surgeon: Celestine Mir MD;  [facility];  Service: Gynecology     Meds/Allergies     CURRENT MEDICATIONS    Current Facility-Administered Medications:      EMS REPLENISHMENT MED, , Does not apply, Once, [facility], DO    Current Outpatient Prescriptions:     acetaminophen (TYLENOL) 325 mg tablet, 650 mg every 4 hours as needed, Disp: 30 tablet, Rfl: 0    Calcium Carb-Cholecalciferol (CALCIUM 500+D3) 500-400 MG-UNIT TABS, Take by mouth, Disp: , Rfl:     cetirizine (ZyrTEC) 10 mg tablet, Take 10 mg by mouth daily, Disp: , Rfl:     conjugated estrogens (PREMARIN) vaginal cream, Insert into the vagina daily, Disp: , Rfl:     Cyanocobalamin 1000 MCG/ML KIT, Inject 1 mL as directed every 30 (thirty) days, Disp: , Rfl:     fluticasone (FLONASE) 50 mcg/act nasal spray, 2 sprays into each nostril daily, Disp: , Rfl:     indapamide (LOZOL) 2 5 mg tablet, Take 2 5 mg by mouth every morning , Disp: , Rfl:     magnesium oxide (MAG-OX) 400 mg, Take 800 mg by mouth daily  , Disp: , Rfl:     nebivolol (BYSTOLIC) 5 mg tablet, Take 5 mg by mouth daily  , Disp: , Rfl:     olmesartan (BENICAR) 40 mg tablet, Take 40 mg by mouth daily at bedtime  , Disp: , Rfl:     pantoprazole (PROTONIX) 40 mg tablet, Take 40 mg by mouth daily at bedtime  , Disp: , Rfl:     rivaroxaban (XARELTO) tablet, Take 15 mg by mouth daily  , Disp: , Rfl:     spironolactone (ALDACTONE) 25 mg tablet, Take 25 mg by mouth daily  , Disp: , Rfl:     verapamil (CALAN) 120 mg tablet, Take 120 mg by mouth every other day , Disp: , Rfl:     (Not in a hospital admission)    ALLERGIES  Allergies   Allergen Reactions    Other Itching     Bandaids, tape    redness     Objective     PHYSICAL EXAM    VITAL SIGNS: There were no vitals taken for this visit  Constitutional:  Well developed, well nourished, no acute distress, non-toxic appearance   Eyes:  PERRL, EOMI, conjunctivae pink, sclerae non-icteric    HENT:  Normocephalic/Atraumatic, no rhinorrhea, mucous membranes moist, Tympanic Membranes clear, no pharyngeal/tonsillar exudates or erythema, no oropharyngeal or tonsillar asymmetry  Neck: normal range of motion, no tenderness, supple   Respiratory:  No respiratory distress, normal breath sounds, no accessory muscle use, no intercostal retractions, no rales, no rhonchi, no wheezing, no stridor   Cardiovascular:  Normal rate, normal rhythm, no murmurs, no gallops, no rubs, peripheral pulses intact, no carotid bruits, no JVD      GI:  Soft, non-tender, non-distended, no organomegaly, no mass, no rebound, no guarding   :  No CVAT, no flank ecchymosis  Musculoskeletal:  No swelling or edema, no tenderness, no deformities  Integument: Pink, warm, dry, Well hydrated, no rash, no erythema, no bullae   Lymphatic:  No cervical/ tonsillar/ submandibular lymphadenopathy noted   Neurologic:  Awake, Alert & oriented x 3, CN 2-12 intact, no focal neurological deficits, motor function intact  Psychiatric:  Speech and behavior appropriate       ED COURSE and MDM:    Assessment/Plan   Assessment:  69 yo female presents for evaluation of centrally located, bandlike chest pressure with radiation to the left scapula that onset approx  1 5 hour ago while at [employer]  +nausea with vomiting and diarrhea at home  No shortness of breath, ripping or tearing pain, edema or swelling, dyspnea on exertion  Patient also describes transient malaise with chills  All symptoms onset today and patient states that she was feeling well for the past 2 days  Last episode with similar symptoms was approximately 1 month ago when she was admitted to the hospital  Patient is pending a stress test scheduled for tomorrow  No other complaints at this time  HEART score: 4-5  WELLS: 0 0 points Low risk group: 1 3% chance of PE in an ED population  Plan:  Labs, EKG, CXR, Symptom management  Disposition as appropriate  Portions of the record may have been created with voice recognition software  Occasional wrong word or "sound a like" substitutions may have occurred due to the inherent limitations of voice recognition software       ED Provider  Electronically Signed by

## 2018-01-01 NOTE — ED PROVIDER NOTES
History  Chief Complaint   Patient presents with    Chest Pain     nausea vomiting diarrhea with cp radiating around to the back        68year-old female with a pmhx of a  Fib on xarelto, pacemaker, HTN, chronic nausea/vomiting/diarrhea of unknown etiology who presents with sudden-onset band like chest pain bilaterally  She states that the pain started suddenly while she was sitting in [employer]  Pain is described as a pressure pain that radiates bilaterally to her lateral chest wall  Pain is not associated with exertion, position, or with deep breathing  She has associated nausea, vomiting, and diaphoresis  She has had similar symptoms earlier this month, for which she was admitted  She did not take anything at home for the pain  She denies headache, dizziness, fever/chills, sore throat, cough, shortness of breath, abdominal pain, constipation, hematochezia, hematuria, hemoptysis, unilateral leg swelling, recent surgery, dysuria, leg swelling  She had a pace make placed 12 years ago and is suppose to be evaluated for a replacement  She is also schedule for a stress test tomorrow  History provided by:  Patient      Prior to Admission Medications   Prescriptions Last Dose Informant Patient Reported? Taking?    Calcium Carb-Cholecalciferol (CALCIUM 500+D3) 500-400 MG-UNIT TABS 2017 at Unknown time  Yes Yes   Sig: Take by mouth   Cyanocobalamin 1000 MCG/ML KIT 2017 at Unknown time  Yes Yes   Sig: Inject 1 mL as directed every 30 (thirty) days   acetaminophen (TYLENOL) 325 mg tablet 2017 at Unknown time  No Yes   Si mg every 4 hours as needed   cetirizine (ZyrTEC) 10 mg tablet 2017 at Unknown time  Yes Yes   Sig: Take 10 mg by mouth daily   conjugated estrogens (PREMARIN) vaginal cream 2017 at Unknown time  Yes Yes   Sig: Insert into the vagina daily   fluticasone (FLONASE) 50 mcg/act nasal spray 2017 at Unknown time  Yes Yes   Si sprays into each nostril daily indapamide (LOZOL) 2 5 mg tablet 12/31/2017 at Unknown time  Yes Yes   Sig: Take 2 5 mg by mouth every morning    magnesium oxide (MAG-OX) 400 mg 12/31/2017 at Unknown time  Yes Yes   Sig: Take 800 mg by mouth daily     nebivolol (BYSTOLIC) 5 mg tablet 45/22/0903 at Unknown time  Yes Yes   Sig: Take 5 mg by mouth daily  olmesartan (BENICAR) 40 mg tablet 12/31/2017 at Unknown time  Yes Yes   Sig: Take 40 mg by mouth daily at bedtime  pantoprazole (PROTONIX) 40 mg tablet 12/31/2017 at Unknown time  Yes Yes   Sig: Take 40 mg by mouth daily at bedtime     rivaroxaban (XARELTO) tablet 12/31/2017 at Unknown time  Yes Yes   Sig: Take 15 mg by mouth daily     spironolactone (ALDACTONE) 25 mg tablet 12/31/2017 at Unknown time  Yes Yes   Sig: Take 25 mg by mouth daily  verapamil (CALAN) 120 mg tablet 12/31/2017 at Unknown time  Yes Yes   Sig: Take 120 mg by mouth every other day        Facility-Administered Medications: None       Past Medical History:   Diagnosis Date    A-fib ([facility] [address] )     Anemia     Arthritis     Heart murmur     High cholesterol     Hx of long term use of blood thinners     Hypertension     Irregular heart beat     Pacemaker     Shortness of breath        Past Surgical History:   Procedure Laterality Date    CARDIAC PACEMAKER PLACEMENT      1996 and april 2009    CARDIAC PACEMAKER PLACEMENT      CATARACT EXTRACTION W/ INTRAOCULAR LENS  IMPLANT, BILATERAL      COLONOSCOPY      LEG SURGERY Right     as a child after a fall    ND COMBINED ANT/POST COLPORRHAPHY N/A 3/17/2016    Procedure: COLPORRHAPHY ANTERIOR POSTERIOR ;  Surgeon: Claudetta Allen, MD;  [facility];  Service: Gynecology    ND CYSTOURETHROSCOPY N/A 3/17/2016    Procedure: Burnadette Crea;  Surgeon: Claudetta Allen, MD;  [facility];  Service: Gynecology    ND REVAGINAL PROLAPSE,SACROSP LIG N/A 3/17/2016    Procedure: COLPOPEXY VAGINAL EXTRAPERITONEAL (VEC) ANTERIOR ;  Surgeon: Claudetta Allen, MD; [facility];  Service: Gynecology    NH SLING OPER STRES INCONTINENCE N/A 3/17/2016    Procedure: INSERTION PUBOVAGINAL SLING SINGLE INCISION ;  Surgeon: Claudetta Bible, MD;  [facility];  Service: Gynecology       History reviewed  No pertinent family history  I have reviewed and agree with the history as documented  Social History   Substance Use Topics    Smoking status: Never Smoker    Smokeless tobacco: Not on file    Alcohol use No        Review of Systems   Constitutional: Negative for activity change, appetite change, chills, diaphoresis, fatigue and fever  HENT: Negative for congestion, ear discharge, facial swelling, hearing loss, rhinorrhea, sinus pain, sinus pressure, sneezing, sore throat, tinnitus and trouble swallowing  Eyes: Negative for pain, discharge and redness  Respiratory: Negative for cough, choking, chest tightness, shortness of breath, wheezing and stridor  Cardiovascular: Positive for chest pain  Negative for palpitations and leg swelling  Gastrointestinal: Positive for diarrhea and vomiting  Negative for abdominal distention, abdominal pain, blood in stool, constipation and nausea  Endocrine: Negative for cold intolerance, polydipsia and polyuria  Genitourinary: Negative for difficulty urinating, dysuria, enuresis, flank pain, frequency and hematuria  Musculoskeletal: Negative for arthralgias, back pain, gait problem and neck stiffness  Skin: Negative for rash and wound  Neurological: Negative for dizziness, seizures, syncope, weakness, numbness and headaches  Hematological: Negative for adenopathy  Psychiatric/Behavioral: Negative for agitation, confusion, hallucinations, sleep disturbance and suicidal ideas  All other systems reviewed and are negative        Physical Exam  ED Triage Vitals   Temperature Pulse Respirations Blood Pressure SpO2   01/01/18 1510 01/01/18 1509 01/01/18 1509 01/01/18 1509 01/01/18 1509   98 6 °F (37 °C) 78 16 150/66 99 %      Temp Source Heart Rate Source Patient Position - Orthostatic VS BP Location FiO2 (%)   01/01/18 1510 01/01/18 1509 01/01/18 1509 01/01/18 1509 --   Tympanic Monitor Sitting Right arm       Pain Score       01/01/18 1509       No Pain           Orthostatic Vital Signs  Vitals:    01/01/18 1509   BP: 150/66   Pulse: 78   Patient Position - Orthostatic VS: Sitting       Physical Exam   Constitutional: She is oriented to person, place, and time  She appears well-developed and well-nourished  No distress  HENT:   Head: Normocephalic and atraumatic  Right Ear: External ear normal    Left Ear: External ear normal    Nose: No sinus tenderness  No epistaxis  Mouth/Throat: No oropharyngeal exudate  Eyes: Conjunctivae and EOM are normal  Pupils are equal, round, and reactive to light  Right eye exhibits no discharge  Left eye exhibits no discharge  Neck: No JVD present  Cardiovascular: Normal rate, regular rhythm, normal heart sounds and intact distal pulses  Exam reveals no gallop and no friction rub  No murmur heard  Pulmonary/Chest: Effort normal and breath sounds normal  No stridor  No respiratory distress  She has no wheezes  She has no rales  Abdominal: Soft  Bowel sounds are normal  She exhibits no distension and no mass  There is no tenderness  There is no rebound and no guarding  Genitourinary: Rectal exam shows guaiac negative stool  Musculoskeletal: Normal range of motion  She exhibits no edema, tenderness or deformity  Lymphadenopathy:     She has no cervical adenopathy  Neurological: She is alert and oriented to person, place, and time  Skin: Skin is warm, dry and intact  Capillary refill takes less than 2 seconds  She is not diaphoretic  Psychiatric: She has a normal mood and affect  Her speech is normal and behavior is normal  Judgment and thought content normal    Nursing note and vitals reviewed        ED Medications  Medications    EMS REPLENISHMENT MED (not administered)       Diagnostic Studies  Results Reviewed     Procedure Component Value Units Date/Time    Basic metabolic panel [[id number]]  (Abnormal) Collected:  01/01/18 1514    Lab Status:  Final result Specimen:  Blood from Arm, Left Updated:  01/01/18 1535     Sodium 142 mmol/L      Potassium 3 5 mmol/L      Chloride 107 mmol/L      CO2 26 mmol/L      Anion Gap 9 mmol/L      BUN 28 (H) mg/dL      Creatinine 1 11 mg/dL      Glucose 99 mg/dL      Calcium 9 2 mg/dL      eGFR 48 ml/min/1 73sq m     Narrative:         National Kidney Disease Education Program recommendations are as follows:  GFR calculation is accurate only with a steady state creatinine  Chronic Kidney disease less than 60 ml/min/1 73 sq  meters  Kidney failure less than 15 ml/min/1 73 sq  meters  POCT troponin [[id number]]  (Normal) Collected:  01/01/18 1514    Lab Status:  Final result Updated:  01/01/18 1527     POC Troponin I 0 00 ng/ml      Specimen Type VENOUS    Narrative:         [employer] i-Stat handheld analyzer 99% cutoff is > 0 08ng/mL in [facility]    o cTnI 99% cutoff is useful only when applied to patients in the clinical setting of myocardial ischemia  o cTnI 99% cutoff should be interpreted in the context of clinical history, ECG findings and possibly cardiac imaging to establish correct diagnosis  o cTnI 99% cutoff may be suggestive but clearly not indicative of a coronary event without the clinical setting of myocardial ischemia      CBC and differential [[id number]]  (Abnormal) Collected:  01/01/18 1514    Lab Status:  Final result Specimen:  Blood from Arm, Left Updated:  01/01/18 1523     WBC 17 48 (H) Thousand/uL      RBC 4 06 Million/uL      Hemoglobin 12 9 g/dL      Hematocrit 38 3 %      MCV 94 fL      MCH 31 8 pg      MCHC 33 7 g/dL      RDW 13 1 %      MPV 11 6 fL      Platelets 905 Thousands/uL      nRBC 0 /100 WBCs      Neutrophils Relative 89 (H) %      Lymphocytes Relative 3 (L) %      Monocytes Relative 8 %      Eosinophils Relative 0 %      Basophils Relative 0 %      Neutrophils Absolute 15 43 (H) Thousands/µL      Lymphocytes Absolute 0 58 (L) Thousands/µL      Monocytes Absolute 1 37 (H) Thousand/µL      Eosinophils Absolute 0 03 Thousand/µL      Basophils Absolute 0 02 Thousands/µL     POCT urinalysis dipstick [[id number]]     Lab Status:  No result Specimen:  Urine                  XR chest 2 views   Final Result by Peter Loya MD (01/01 1543)      No active pulmonary disease  Workstation performed: [id number]               Procedures  ECG 12 Lead Documentation  Date/Time: 1/1/2018 3:33 PM  Performed by: Yudy Davey  Authorized by: Hingi Coco D     Indications / Diagnosis:  Chest pain  Patient location:  ED  Previous ECG:     Previous ECG:  Compared to current    Comparison ECG info:  [id number]    Similarity:  Changes noted    Comparison to cardiac monitor: Yes    Interpretation:     Interpretation: abnormal    Rate:     ECG rate:  91    ECG rate assessment: normal    Rhythm:     Rhythm: atrial fibrillation    Ectopy:     Ectopy: none    QRS:     QRS axis:  Left    QRS intervals:  Normal  Conduction:     Conduction: normal    ST segments:     ST segments:  Normal  T waves:     T waves: normal    Comments:      No obvious pacer spike as compared to her previous V paced ecg          Phone Consults  ED Phone Contact    ED Course  ED Course                                MDM  Number of Diagnoses or Management Options  Chest pain: new and requires workup  Diagnosis management comments:    68year-old female with a pmhx of a  Fib on xarelto, pacemaker, HTN, chronic nausea/vomiting/diarrhea of unknown etiology who presents with sudden-onset band like chest pain bilaterally  Heart score 5  Low [place], Can't PERC out    1  Chest pain  -Admit to SOD for cardiac work up and trending trops    2   Leukocytosis  -unknown origin     CritCare Time    Disposition  Final diagnoses:   Chest pain Time reflects when diagnosis was documented in both MDM as applicable and the Disposition within this note     Time User Action Codes Description Comment    1/1/2018  3:51 PM Kwabena Jo Annetta Meigs Add [R07 9] Chest pain       ED Disposition     ED Disposition Condition Comment    Admit  Case was discussed with SOD-A and the patient's admission status was agreed to be Admission Status: observation status to the service of Dr Xiomy Trevizo   Follow-up Information    None       Current Discharge Medication List      CONTINUE these medications which have NOT CHANGED    Details   acetaminophen (TYLENOL) 325 mg tablet 650 mg every 4 hours as needed  Qty: 30 tablet, Refills: 0      Calcium Carb-Cholecalciferol (CALCIUM 500+D3) 500-400 MG-UNIT TABS Take by mouth      cetirizine (ZyrTEC) 10 mg tablet Take 10 mg by mouth daily      conjugated estrogens (PREMARIN) vaginal cream Insert into the vagina daily      Cyanocobalamin 1000 MCG/ML KIT Inject 1 mL as directed every 30 (thirty) days      fluticasone (FLONASE) 50 mcg/act nasal spray 2 sprays into each nostril daily      indapamide (LOZOL) 2 5 mg tablet Take 2 5 mg by mouth every morning       magnesium oxide (MAG-OX) 400 mg Take 800 mg by mouth daily        nebivolol (BYSTOLIC) 5 mg tablet Take 5 mg by mouth daily  olmesartan (BENICAR) 40 mg tablet Take 40 mg by mouth daily at bedtime  pantoprazole (PROTONIX) 40 mg tablet Take 40 mg by mouth daily at bedtime        rivaroxaban (XARELTO) tablet Take 15 mg by mouth daily        spironolactone (ALDACTONE) 25 mg tablet Take 25 mg by mouth daily  verapamil (CALAN) 120 mg tablet Take 120 mg by mouth every other day  No discharge procedures on file  ED Provider  Attending physically available and evaluated Wisam Tavera I managed the patient along with the ED Attending      Electronically Signed by         Luis Miguel Olmstead DO  Resident  01/01/18 2413 Jarad Hastings DO  Resident  01/01/18 7585

## 2018-01-02 VITALS
OXYGEN SATURATION: 99 % | HEART RATE: 83 BPM | SYSTOLIC BLOOD PRESSURE: 127 MMHG | TEMPERATURE: 98.1 F | WEIGHT: 136 LBS | RESPIRATION RATE: 18 BRPM | HEIGHT: 62 IN | DIASTOLIC BLOOD PRESSURE: 65 MMHG | BODY MASS INDEX: 25.03 KG/M2

## 2018-01-02 PROBLEM — R07.9 CHEST PAIN: Status: RESOLVED | Noted: 2017-12-08 | Resolved: 2018-01-02

## 2018-01-02 LAB
ANION GAP SERPL CALCULATED.3IONS-SCNC: 5 MMOL/L (ref 4–13)
BUN SERPL-MCNC: 21 MG/DL (ref 5–25)
CALCIUM SERPL-MCNC: 9 MG/DL (ref 8.3–10.1)
CHLORIDE SERPL-SCNC: 106 MMOL/L (ref 100–108)
CO2 SERPL-SCNC: 29 MMOL/L (ref 21–32)
CREAT SERPL-MCNC: 1.03 MG/DL (ref 0.6–1.3)
ERYTHROCYTE [DISTWIDTH] IN BLOOD BY AUTOMATED COUNT: 13.3 % (ref 11.6–15.1)
GFR SERPL CREATININE-BSD FRML MDRD: 53 ML/MIN/1.73SQ M
GLUCOSE SERPL-MCNC: 101 MG/DL (ref 65–140)
HCT VFR BLD AUTO: 36.7 % (ref 34.8–46.1)
HGB BLD-MCNC: 12 G/DL (ref 11.5–15.4)
MCH RBC QN AUTO: 31.2 PG (ref 26.8–34.3)
MCHC RBC AUTO-ENTMCNC: 32.7 G/DL (ref 31.4–37.4)
MCV RBC AUTO: 95 FL (ref 82–98)
PLATELET # BLD AUTO: 198 THOUSANDS/UL (ref 149–390)
PMV BLD AUTO: 11.7 FL (ref 8.9–12.7)
POTASSIUM SERPL-SCNC: 3.9 MMOL/L (ref 3.5–5.3)
RBC # BLD AUTO: 3.85 MILLION/UL (ref 3.81–5.12)
SODIUM SERPL-SCNC: 140 MMOL/L (ref 136–145)
WBC # BLD AUTO: 6.02 THOUSAND/UL (ref 4.31–10.16)

## 2018-01-02 PROCEDURE — 85027 COMPLETE CBC AUTOMATED: CPT | Performed by: INTERNAL MEDICINE

## 2018-01-02 PROCEDURE — 93005 ELECTROCARDIOGRAM TRACING: CPT | Performed by: INTERNAL MEDICINE

## 2018-01-02 PROCEDURE — 80048 BASIC METABOLIC PNL TOTAL CA: CPT | Performed by: INTERNAL MEDICINE

## 2018-01-02 RX ORDER — MAGNESIUM SULFATE HEPTAHYDRATE 40 MG/ML
2 INJECTION, SOLUTION INTRAVENOUS ONCE
Status: COMPLETED | OUTPATIENT
Start: 2018-01-02 | End: 2018-01-02

## 2018-01-02 RX ADMIN — CALCIUM CARBONATE-CHOLECALCIFEROL TAB 250 MG-125 UNIT 1 TABLET: 250-125 TAB at 10:27

## 2018-01-02 RX ADMIN — LORATADINE 10 MG: 10 TABLET ORAL at 10:25

## 2018-01-02 RX ADMIN — MAGNESIUM SULFATE HEPTAHYDRATE 2 G: 40 INJECTION, SOLUTION INTRAVENOUS at 02:05

## 2018-01-02 RX ADMIN — FLUTICASONE PROPIONATE 2 SPRAY: 50 SPRAY, METERED NASAL at 10:25

## 2018-01-02 RX ADMIN — RIVAROXABAN 15 MG: 15 TABLET, FILM COATED ORAL at 10:28

## 2018-01-02 RX ADMIN — Medication 800 MG: at 10:27

## 2018-01-02 NOTE — PROGRESS NOTES
[facility] Residency Progress Note   Unit/Bed#: CW2 217-01 Encounter: [id number]  SOD Team A      Satish Lima 68 y o  female [id number]    Hospital Stay Days: 0      Assessment/Plan:    Principal Problem:    Chest pain  Active Problems:    Essential hypertension    GERD (gastroesophageal reflux disease)    Hyperlipemia    Stage 3 chronic kidney disease    Osteoarthritis    Atrial fibrillation ([facility] [address] )    1  Chest pain  Patient affirms a single episode of bandlike pressure sensation across her chest that occurred on day of admission  This sensation occurred immediately after the patient had an episode of vomiting while she was in [employer]  Patient states that she has had 2 previous episodes in the past the most recent of which occurred at the beginning of December  The patient was admitted and had a comprehensive evaluation  At that time the testing was equivocal but the patient was scheduled for an outpatient stress test (this was to be performed on 1/2)  Given the previous workup and the lack of concerning features, this is extremely unlikely to be cardiac in nature  Troponin continue to be negative  EKG demonstrated Ventricular-paced rhythm with occasional supraventricular complexes              WBC normalized, now 6 0 from 17 on admission   Electrolytes wnl    2  Leukocytosis  resolved  Uncertain etiology  Given the chronic nature of the patient's nausea/vomiting/diarrhea I doubt that this is related  My initial impression is that this is likely a stress reaction  Repeat CBC wbc is 6 0              Continue to monitor closely for an identifiable source of infection              Continue to monitor vital signs for fever or hypotension  Afebrile with good bp      3   Intractable nausea and vomiting, this is a chronic issue for the patient  This episode of nausea vomiting and diarrhea is 1 of the patient's chronic issues    The concerning feature with this is that it was related to a bandlike pressure sensation across the patient's chest   This is the 3rd such episode  Again doubt that this is cardiac in nature but will continue to monitor as above  Continue patient's current home medication regimen focusing on antinausea and vomiting (pantoprazole, magnesium oxide)  Continue to monitor the patient closely for repeat of this symptoms  4   Gastroesophageal reflux disease  This is a chronic issue for the patient  Treatment as outlined above  Continue home medications targeting reflux symptoms (pantoprazole, magnesium oxide)  5   Essential hypertension, chronic, stable  patient's blood pressure was 155/56 on admission, currently better controlled at 105/56  Continue home medications (Bystolic, Benicar, LOZOL, spironolactone, and verapamil )    6  Atrial fibrillation, currently on anticoagulation with Xarelto, Verapamil/Bystolic for rate control  Rate has been well controlled pulse was in the high 70s on admission, currently is 70     7  Multiple PVC  On telemetry pt was noted to have numerous PVC's  Electrolytes were repleted  PVC have resolved  Biventricularly paced on tele currently  Will continue to monitor on tele  Disposition:  Continue observation status for now  Subjective:   24 hour events:   No acute events overnight  Pt was noted to have multiple PVC's on telemetry  Mag and K were repleted  Pt asymptomatic throughout  Troponins remain negative  EKG: Ventricular-paced rhythm with occasional supraventricular complexes  Ideally would like to schedule stress test will admitted  Will touch base with cardiology about this  Pt has no acute complaints  No cp/no nausea or vomiting  Did have a loose stool this morning that she associated with magnesium  Will also need a GI workup as an outpatient          Vitals: Temp (24hrs), Av 1 °F (36 7 °C), Min:97 5 °F (36 4 °C), Max:98 6 °F (37 °C)  Current: Temperature: 98 3 °F (36 8 °C)  Vitals:    01/01/18 2005 01/01/18 2006 01/01/18 2300 01/02/18 0325   BP: 121/60 130/58 112/57 105/56   Pulse: 94 84 70 70   Resp:   16 18   Temp:   98 1 °F (36 7 °C) 98 3 °F (36 8 °C)   TempSrc:   Oral Oral   SpO2: 98% 97% 97% 97%   Weight:       Height:        Body mass index is 24 87 kg/m²  No intake/output data recorded  Physical Exam:   Physical Exam   Constitutional: She is oriented to person, place, and time  She appears well-developed and well-nourished  No distress  Elderly female in NAD, resting comfortably in the bed  Pleasant and conversant  Faroese language dependent  HENT:   Head: Normocephalic and atraumatic  Mouth/Throat: Oropharynx is clear and moist  No oropharyngeal exudate  Eyes: Pupils are equal, round, and reactive to light  No scleral icterus  Neck: No JVD present  No carotid bruit   Cardiovascular: Normal rate, regular rhythm and normal heart sounds  Exam reveals no gallop and no friction rub  No murmur heard  Pulmonary/Chest: Effort normal and breath sounds normal  No respiratory distress  She has no wheezes  She has no rales  No accessory muscle recruitment  Abdominal: Soft  Bowel sounds are normal  She exhibits no distension  There is no tenderness  There is no rebound and no guarding  Musculoskeletal: She exhibits no edema or deformity  Neurological: She is alert and oriented to person, place, and time  No cranial nerve deficit  Skin: Skin is warm and dry  No rash noted  She is not diaphoretic  No erythema  Psychiatric: She has a normal mood and affect  Her behavior is normal  Judgment and thought content normal    Nursing note and vitals reviewed          Invasive Devices     Peripheral Intravenous Line            Peripheral IV 01/01/18 Right Antecubital 1 day    Peripheral IV 01/01/18 Left Antecubital less than 1 day          Drain            Urethral Catheter Double-lumen 16 Fr  654 days                          Labs:   Recent Results (from the past 24 hour(s))   ECG 12 lead    Collection Time: 01/01/18  2:46 PM   Result Value Ref Range    Ventricular Rate 91 BPM    Atrial Rate 234 BPM    MO Interval  ms    QRSD Interval 90 ms    QT Interval 370 ms    QTC Interval 455 ms    P Axis  degrees    QRS Axis -31 degrees    T Wave Axis -7 degrees   Basic metabolic panel    Collection Time: 01/01/18  3:14 PM   Result Value Ref Range    Sodium 142 136 - 145 mmol/L    Potassium 3 5 3 5 - 5 3 mmol/L    Chloride 107 100 - 108 mmol/L    CO2 26 21 - 32 mmol/L    Anion Gap 9 4 - 13 mmol/L    BUN 28 (H) 5 - 25 mg/dL    Creatinine 1 11 0 60 - 1 30 mg/dL    Glucose 99 65 - 140 mg/dL    Calcium 9 2 8 3 - 10 1 mg/dL    eGFR 48 ml/min/1 73sq m   CBC and differential    Collection Time: 01/01/18  3:14 PM   Result Value Ref Range    WBC 17 48 (H) 4 31 - 10 16 Thousand/uL    RBC 4 06 3 81 - 5 12 Million/uL    Hemoglobin 12 9 11 5 - 15 4 g/dL    Hematocrit 38 3 34 8 - 46 1 %    MCV 94 82 - 98 fL    MCH 31 8 26 8 - 34 3 pg    MCHC 33 7 31 4 - 37 4 g/dL    RDW 13 1 11 6 - 15 1 %    MPV 11 6 8 9 - 12 7 fL    Platelets 655 342 - 848 Thousands/uL    nRBC 0 /100 WBCs    Neutrophils Relative 89 (H) 43 - 75 %    Lymphocytes Relative 3 (L) 14 - 44 %    Monocytes Relative 8 4 - 12 %    Eosinophils Relative 0 0 - 6 %    Basophils Relative 0 0 - 1 %    Neutrophils Absolute 15 43 (H) 1 85 - 7 62 Thousands/µL    Lymphocytes Absolute 0 58 (L) 0 60 - 4 47 Thousands/µL    Monocytes Absolute 1 37 (H) 0 17 - 1 22 Thousand/µL    Eosinophils Absolute 0 03 0 00 - 0 61 Thousand/µL    Basophils Absolute 0 02 0 00 - 0 10 Thousands/µL   POCT troponin    Collection Time: 01/01/18  3:14 PM   Result Value Ref Range    POC Troponin I 0 00 0 00 - 0 08 ng/ml    Specimen Type VENOUS    Troponin I    Collection Time: 01/01/18  8:16 PM   Result Value Ref Range    Troponin I <0 02 <=0 04 ng/mL   Magnesium    Collection Time: 01/01/18 10:00 PM   Result Value Ref Range    Magnesium 1 7 1 6 - 2 6 mg/dL   ECG 12 lead Collection Time: 01/01/18 10:22 PM   Result Value Ref Range    Ventricular Rate 70 BPM    Atrial Rate 71 BPM    UT Interval  ms    QRSD Interval 172 ms    QT Interval 466 ms    QTC Interval 503 ms    P Axis  degrees    QRS Axis -54 degrees    T Wave Axis 81 degrees   Basic metabolic panel    Collection Time: 01/02/18  5:22 AM   Result Value Ref Range    Sodium 140 136 - 145 mmol/L    Potassium 3 9 3 5 - 5 3 mmol/L    Chloride 106 100 - 108 mmol/L    CO2 29 21 - 32 mmol/L    Anion Gap 5 4 - 13 mmol/L    BUN 21 5 - 25 mg/dL    Creatinine 1 03 0 60 - 1 30 mg/dL    Glucose 101 65 - 140 mg/dL    Calcium 9 0 8 3 - 10 1 mg/dL    eGFR 53 ml/min/1 73sq m   CBC (With Platelets)    Collection Time: 01/02/18  5:22 AM   Result Value Ref Range    WBC 6 02 4 31 - 10 16 Thousand/uL    RBC 3 85 3 81 - 5 12 Million/uL    Hemoglobin 12 0 11 5 - 15 4 g/dL    Hematocrit 36 7 34 8 - 46 1 %    MCV 95 82 - 98 fL    MCH 31 2 26 8 - 34 3 pg    MCHC 32 7 31 4 - 37 4 g/dL    RDW 13 3 11 6 - 15 1 %    Platelets 291 418 - 823 Thousands/uL    MPV 11 7 8 9 - 12 7 fL       Radiology Results: I have personally reviewed pertinent reports  Other Diagnostic Testing:   I have personally reviewed pertinent reports          Active Meds:   Current Facility-Administered Medications   Medication Dose Route Frequency    acetaminophen (TYLENOL) tablet 325 mg  325 mg Oral Q6H PRN    calcium carbonate-vitamin D (OYSTER SHELL,OSCAL+D) 250 mg-125 units per tablet 1 tablet  1 tablet Oral Daily With Breakfast    conjugated estrogens (PREMARIN) vaginal cream 1 g  1 g Vaginal Daily    fluticasone (FLONASE) 50 mcg/act nasal spray 2 spray  2 spray Nasal Daily    indapamide (LOZOL) tablet 2 5 mg  2 5 mg Oral QAM    loratadine (CLARITIN) tablet 10 mg  10 mg Oral Daily    magnesium oxide (MAG-OX) tablet 800 mg  800 mg Oral Daily    nebivolol (BYSTOLIC) tablet 5 mg  5 mg Oral Daily    olmesartan (BENICAR) tablet 40 mg  40 mg Oral HS    pantoprazole (PROTONIX) EC tablet 40 mg  40 mg Oral HS    rivaroxaban (XARELTO) tablet 15 mg  15 mg Oral Daily    spironolactone (ALDACTONE) tablet 25 mg  25 mg Oral Daily    verapamil (CALAN-SR) CR tablet 120 mg  120 mg Oral Every Other Day         VTE Pharmacologic Prophylaxis:  Xarelto   VTE Mechanical Prophylaxis: sequential compression device    Salma Howard DO

## 2018-01-02 NOTE — CASE MANAGEMENT
Thank you,  [address] [facility] in the [facility] by [address] for 2017  Network Utilization Review Department  Phone: [phone]; Fax [phone]  ATTENTION: The Network Utilization Review Department is now centralized for our 7 Facilities  Make a note that we have a new phone and fax numbers for our Department  Please call with any questions or concerns to [phone] and carefully follow the prompts so that you are directed to the right person  All voicemails are confidential  Fax any determinations, approvals, denials, and requests for initial or continue stay review clinical to [phone]  Due to HIGH CALL volume, it would be easier if you could please send faxed requests to expedite your requests and in part, help us provide discharge notifications faster      ----------------------------------------------------------------------------------------------------------------    Initial Clinical Review    Admission: Date/Time/Statement: 1/1/2018 @ 1552  Orders Placed This Encounter   Procedures    Place in Observation (expected length of stay for this patient is less than two midnights)     Standing Status:   Standing     Number of Occurrences:   1     Order Specific Question:   Admitting Physician     Answer:   Classie Carrel     Order Specific Question:   Level of Care     Answer:   Med Surg [16]         ED: Date/Time/Mode of Arrival:   ED Arrival Information     Expected Arrival [address] of Arrival Escorted By Service Admission Type     1/1/2018 14:25 Urgent Ambulance [facility] EMS General Medicine Urgent    Arrival Complaint    - chest pain          Chief Complaint:   Chief Complaint   Patient presents with    Chest Pain     nausea vomiting diarrhea with cp radiating around to the back       History of Illness:   Satish Lima is a 68 y o  female with a past medical history significant for atrial fibrillation currently on Xarelto for anticoagulation, pacemaker, hypertension, chronic nausea vomiting and diarrhea of an unknown etiology presents to the emergency department for evaluation of sudden onset band like chest pain  Patient states the chest pain began while she was in [place], immediately following an episode of nausea and vomiting  Patient describes the pain as a bandlike pressure that radiates bilaterally across her chest as well as to her back  Pain is not associated with exertion, changes in position or with deep inspiration  She does affirm episodes of nausea vomiting and diarrhea, but is difficult to determine whether not this is just her chronic issues or if this is somehow related to the chest pain  She had similar episode to this earlier in the month and was admitted had a cardiac workup all of which was is equivocal   Patient is due to have a stress test performed tomorrow as an outpatient  ED Vital Signs:   ED Triage Vitals   Temperature Pulse Respirations Blood Pressure SpO2   01/01/18 1510 01/01/18 1509 01/01/18 1509 01/01/18 1509 01/01/18 1509   98 6 °F (37 °C) 78 16 150/66 99 %      Temp Source Heart Rate Source Patient Position - Orthostatic VS BP Location FiO2 (%)   01/01/18 1510 01/01/18 1509 01/01/18 1509 01/01/18 1509 --   Tympanic Monitor Sitting Right arm       Pain Score       01/01/18 1509       No Pain        Wt Readings from Last 1 Encounters:   01/01/18 61 7 kg (136 lb)     Abnormal Labs/Diagnostic Test Results:   Chest x-ray that was negative for any pulmonary issues  EKG demonstrated atrial fibrillation   white blood cell count of 17 5    troponin was 0    ED Treatment:   Medication Administration from 01/01/2018 1428 to 01/01/2018 1609     None          Past Medical/Surgical History:    Active Ambulatory Problems     Diagnosis Date Noted    Chest pain 12/08/2017    Intractable vomiting with nausea 12/08/2017    Essential hypertension 12/08/2017    GERD (gastroesophageal reflux disease) 12/08/2017  Hyperlipemia 12/08/2017    Pernicious anemia 12/08/2017    Stage 3 chronic kidney disease 12/08/2017    Osteoarthritis 12/08/2017    Atrial fibrillation ([facility] [address] ) 12/08/2017     Resolved Ambulatory Problems     Diagnosis Date Noted    Uterine prolapse 03/18/2016    MARY (stress urinary incontinence, female) 03/18/2016     Past Medical History:   Diagnosis Date    A-fib ([facility] [address] )     Anemia     Arthritis     Heart murmur     High cholesterol     Hx of long term use of blood thinners     Hypertension     Irregular heart beat     Pacemaker     Shortness of breath        Admitting Diagnosis: Chest pain [R07 9]    Age/Sex: 68 y o  female    Assessment/Plan:   Chest pain      Intractable vomiting with nausea     Essential hypertension (Chronic)     GERD (gastroesophageal reflux disease) (Chronic)     Hyperlipemia (Chronic)     Pernicious anemia (Chronic)     Stage 3 chronic kidney disease (Chronic)     Osteoarthritis (Chronic)     Atrial fibrillation (HCC) (Chronic)          1  Chest pain  Patient affirms a single episode of bandlike pressure sensation across her chest that occurred today  This sensation occurred immediately after the patient had an episode of vomiting while she was in [employer]  Patient states that she has had 2 previous episodes in the past the most recent of which occurred at the beginning of December  The patient was admitted and had a comprehensive evaluation  At that time the testing was equivocal but the patient was scheduled for an outpatient stress test (this was to be performed on 1/2)  Given the previous workup and the lack of concerning features, this is extremely unlikely to be cardiac in nature  We will obtain 1 more troponin is a verify that there has been no increase in cardiac enzymes  CBC and BMP will be obtained in the morning to make sure that there is no elevation of the white blood cell count  2   Leukocytosis    Uncertain etiology  Given the chronic nature of the patient's nausea/vomiting/diarrhea I doubt that this is related  My initial impression is that this is likely a stress reaction  Repeat CBC in the morning to verify the white blood cell count is trending in a reassuring direction  Continue to monitor closely for an identifiable source of infection              Continue to monitor vital signs for fever or hypotension  3   Intractable nausea and vomiting, this is a chronic issue for the patient  This episode of nausea vomiting and diarrhea is 1 of the patient's chronic issues  The concerning feature with this is that it was related to a bandlike pressure sensation across the patient's chest   This is the 3rd such episode  Again doubt that this is cardiac in nature but will continue to monitor as above  Continue patient's current home medication regimen focusing on antinausea and vomiting (pantoprazole, magnesium oxide)  Continue to monitor the patient closely for repeat of this symptoms  4   Gastroesophageal reflux disease  This is a chronic issue for the patient  Treatment as outlined above  Continue home medications targeting reflux symptoms (pantoprazole, magnesium oxide)  5   Essential hypertension, chronic, stable  Continue home medications (Bystolic, Benicar, LOZOL, spironolactone, and verapamil )  6    Atrial fibrillation, currently on anticoagulation with Xarelto, Bystolic for rate control         Code Status: Level 1 - Full Code  VTE Pharmacologic Prophylaxis:   Xarelto  VTE Mechanical Prophylaxis: sequential compression device  Admission Status: OBSERVATION      Admission Orders:  Scheduled Meds:   calcium carbonate-vitamin D 1 tablet Oral Daily With Breakfast   conjugated estrogens 1 g Vaginal Daily   fluticasone 2 spray Nasal Daily   indapamide 2 5 mg Oral QAM   loratadine 10 mg Oral Daily   magnesium oxide 800 mg Oral Daily   nebivolol 5 mg Oral Daily olmesartan 40 mg Oral HS   pantoprazole 40 mg Oral HS   rivaroxaban 15 mg Oral Daily   spironolactone 25 mg Oral Daily   verapamil 120 mg Oral Every Other Day     Continuous Infusions:    PRN Meds:   acetaminophen    Consult GI  TELM  Stress:  Pending    -----------------------------------------------------------------------------------------------------------------------------    Continued Stay Review    Date: 1/2/2018    Vital Signs: /55   Pulse 69   Temp 97 6 °F (36 4 °C) (Oral)   Resp 18   Ht 5' 2" (1 575 m)   Wt 61 7 kg (136 lb)   SpO2 95%   BMI 24 87 kg/m²     Medications:   Scheduled Meds:   calcium carbonate-vitamin D 1 tablet Oral Daily With Breakfast   conjugated estrogens 1 g Vaginal Daily   fluticasone 2 spray Nasal Daily   indapamide 2 5 mg Oral QAM   loratadine 10 mg Oral Daily   magnesium oxide 800 mg Oral Daily   nebivolol 5 mg Oral Daily   olmesartan 40 mg Oral HS   pantoprazole 40 mg Oral HS   rivaroxaban 15 mg Oral Daily   spironolactone 25 mg Oral Daily   verapamil 120 mg Oral Every Other Day     Continuous Infusions:    PRN Meds:   acetaminophen    Abnormal Labs/Diagnostic Results:     Age/Sex: 68 y o  female     Assessment/Plan:    Chest pain  Active Problems:    Essential hypertension    GERD (gastroesophageal reflux disease)    Hyperlipemia    Stage 3 chronic kidney disease    Osteoarthritis    Atrial fibrillation (HCC)     1   Chest pain   Patient affirms a single episode of bandlike pressure sensation across her chest that occurred on day of admission   This sensation occurred immediately after the patient had an episode of vomiting while she was in [facility] states that she has had 2 previous episodes in the past the most recent of which occurred at the beginning of December   The patient was admitted and had a comprehensive evaluation   At that time the testing was equivocal but the patient was scheduled for an outpatient stress test (this was to be performed on 1/2)               Given the previous workup and the lack of concerning features, this is extremely unlikely to be cardiac in nature               Troponin continue to be negative  EKG demonstrated Ventricular-paced rhythm with occasional supraventricular complexes              WBC normalized, now 6 0 from 17 on admission              Electrolytes wnl     2   Leukocytosis   resolved  Uncertain etiology   Given the chronic nature of the patient's nausea/vomiting/diarrhea I doubt that this is related   My initial impression is that this is likely a stress reaction               Repeat CBC wbc is 6 0              Continue to monitor closely for an identifiable source of infection              Continue to monitor vital signs for fever or hypotension  Afebrile with good bp       3   Intractable nausea and vomiting, this is a chronic issue for the patient   This episode of nausea vomiting and diarrhea is 1 of the patient's chronic issues   The concerning feature with this is that it was related to a bandlike pressure sensation across the patient's chest   This is the 3rd such episode   Again doubt that this is cardiac in nature but will continue to monitor as above               Continue patient's current home medication regimen focusing on antinausea and vomiting (pantoprazole, magnesium oxide)              Continue to monitor the patient closely for repeat of this symptoms      4   Gastroesophageal reflux disease   This is a chronic issue for the patient   Treatment as outlined above   Continue home medications targeting reflux symptoms (pantoprazole, magnesium oxide)      5   Essential hypertension, chronic, stable    patient's blood pressure was 155/56 on admission, currently better controlled at 105/56  Continue home medications (Bystolic, Benicar, LOZOL, spironolactone, and verapamil )     6   Atrial fibrillation, currently on anticoagulation with Xarelto, Verapamil/Bystolic for rate control    Rate has been well controlled pulse was in the high 70s on admission, currently is 70      7  Multiple PVC  On telemetry pt was noted to have numerous PVC's  Electrolytes were repleted  PVC have resolved  Biventricularly paced on tele currently  Will continue to monitor on tele       Disposition:  Continue observation status for now    VTE Pharmacologic Prophylaxis:  Xarelto   VTE Mechanical Prophylaxis: sequential compression device     Discharge Plan: TBD

## 2018-01-02 NOTE — DISCHARGE SUMMARY
[facility] Discharge Summary - Medical Hari Mcneill 68 y o  female MRN: [id number]    [address] [place] [address] Room / Bed: [place] [id number] Encounter: [id number]    BRIEF OVERVIEW    Admitting Provider: Timi Becerra DO  Discharge Provider: Timi Becerra DO  Primary Care Physician at Discharge: Dr Dean Reis    Discharge To: Home/self/family  Facility / Family Member Name: Darshana Rust (son)  Phone Number: [phone]    Admission Date: 1/1/2018     Discharge Date: 1/2/2017     Primary Discharge Diagnosis  Principal Problem:    Chest pain  Active Problems:    Essential hypertension    GERD (gastroesophageal reflux disease)    Hyperlipemia    Stage 3 chronic kidney disease    Osteoarthritis    Atrial fibrillation ([address] )  Resolved Problems:    * No resolved hospital problems  *      Other Problems Addressed: None    Consulting Providers   Gastroenterology Sumit Brown)      Therapeutic Operative Procedures Performed  None    Diagnostic Procedures Performed  Results Reviewed     Procedure Component Value Units Date/Time    Basic metabolic panel [[id number]]  (Abnormal) Collected:  01/01/18 1514    Lab Status:  Final result Specimen:  Blood from Arm, Left Updated:  01/01/18 1535     Sodium 142 mmol/L      Potassium 3 5 mmol/L      Chloride 107 mmol/L      CO2 26 mmol/L      Anion Gap 9 mmol/L      BUN 28 (H) mg/dL      Creatinine 1 11 mg/dL      Glucose 99 mg/dL      Calcium 9 2 mg/dL      eGFR 48 ml/min/1 73sq m     Narrative:         National Kidney Disease Education Program recommendations are as follows:  GFR calculation is accurate only with a steady state creatinine  Chronic Kidney disease less than 60 ml/min/1 73 sq  meters  Kidney failure less than 15 ml/min/1 73 sq  meters      POCT troponin [[id number]]  (Normal) Collected:  01/01/18 1514    Lab Status:  Final result Updated:  01/01/18 1527     POC Troponin I 0 00 ng/ml      Specimen Type VENOUS    Narrative: Abbott i-Stat handheld analyzer 99% cutoff is > 0 08ng/mL in network Emergency Departments    o cTnI 99% cutoff is useful only when applied to patients in the clinical setting of myocardial ischemia  o cTnI 99% cutoff should be interpreted in the context of clinical history, ECG findings and possibly cardiac imaging to establish correct diagnosis  o cTnI 99% cutoff may be suggestive but clearly not indicative of a coronary event without the clinical setting of myocardial ischemia  CBC and differential [[id number]]  (Abnormal) Collected:  01/01/18 1514    Lab Status:  Final result Specimen:  Blood from Arm, Left Updated:  01/01/18 1523     WBC 17 48 (H) Thousand/uL      RBC 4 06 Million/uL      Hemoglobin 12 9 g/dL      Hematocrit 38 3 %      MCV 94 fL      MCH 31 8 pg      MCHC 33 7 g/dL      RDW 13 1 %      MPV 11 6 fL      Platelets 192 Thousands/uL      nRBC 0 /100 WBCs      Neutrophils Relative 89 (H) %      Lymphocytes Relative 3 (L) %      Monocytes Relative 8 %      Eosinophils Relative 0 %      Basophils Relative 0 %      Neutrophils Absolute 15 43 (H) Thousands/µL      Lymphocytes Absolute 0 58 (L) Thousands/µL      Monocytes Absolute 1 37 (H) Thousand/µL      Eosinophils Absolute 0 03 Thousand/µL      Basophils Absolute 0 02 Thousands/µL     POCT urinalysis dipstick [[id number]]     Lab Status:  No result Specimen:  Urine         CHEST X-Ray - DUAL ENERGY     INDICATION:  Nausea, vomiting and diarrhea     COMPARISON:  Chest radiograph 12/8/2017     VIEWS:  PA (including soft tissue/bone algorithms) and lateral projections     IMAGES:  4     FINDINGS:  There is a right-sided pacer generator device with intact lead      Cardiomediastinal silhouette appears unremarkable      The lungs are clear    No pneumothorax or pleural effusion      Visualized osseous structures appear within normal limits for the patient's age      IMPRESSION:     No active pulmonary disease        Discharge Disposition: Home/Self Care  Discharged With Lines: no    Test Results Pending at Discharge:   stool culture, O&P, and C  Diff PCR  She is also due for colonoscopy this year per history of tubular adenomatous polyps  Colonoscopy can also r/o microscopic colitis vs  IBD  She will need EGD and RUQ U/S to continue work-up for noncardiac chest pain if stress test is inconclusive  Outpatient Follow-Up  Please make an appointment to follow up with Dr Maira Fierro as an outpatient to address this admission  Follow up with consulting providers  yes      Specialty: Cardiology  Office phone number: [phone]  Follow up within next: 2 weeks, after stress test as an outpatient  Specialty:  Gastroenterology  Office phone number: [phone]  Follow up within next: 2 weeks, after stool culture, O&P, and C  Diff PCR are completed  She will also need an EGD and a right upper quadrant ultrasound if stress test is inconclusive  Active Issues Requiring Follow-up   none    Code Status: Level 1 - Full Code    Medications   See after visit summary for reconciled discharge medications provided to patient and family  Allergies  Allergies   Allergen Reactions    Other Itching     Bandaids, tape    redness     Discharge Diet: Follow BRAT diet and encourage fluid intake until patient can be seen as outpatient  Activity restrictions: none    [address]  Patient presented emergency department with a recurrent episode of nausea and vomiting while she was getting ready to go to [facility]  Patient states that she had a star like colors in front of her face  She had a sensation of vertigo  She states she sat down at this point and then began to vomit a large volume of yellow vomitus  She also noted episodes of loose stools as well as a bandlike pressure sensation across the lower portion of her chest   She has had 2 previous episodes in the past that have been similar to this once October once in December 68 y o  female with a past medical history significant for atrial fibrillation currently on Xarelto for anticoagulation, pacemaker, hypertension, chronic nausea vomiting and diarrhea of an unknown etiology presents to the emergency department for evaluation of sudden onset band like chest pain  Patient states the chest pain began while she was in [employer], immediately following an episode of nausea and vomiting  Patient describes the pain as a bandlike pressure that radiates bilaterally across her chest as well as to her back  Pain is not associated with exertion, changes in position or with deep inspiration  She does affirm episodes of nausea vomiting and diarrhea, but is difficult to determine whether not this is just her chronic issues or if this is somehow related to the chest pain  She had similar episode to this earlier in the month and was admitted had a cardiac workup all of which was is equivocal    patient was due to have an outpatient stress test on the day after she was admitted      While in the emergency department the patient had a CBC which demonstrated a white blood cell count of 17 5 K  patient's BMP was within normal limits  Patient's point of care troponin was 0  Patient had a chest x-ray that was negative for any pulmonary issues  EKG demonstrated atrial fibrillation but otherwise was negative for concerns for any acute cardiac pathology, specifically myocardial infarction      After the patient was placed in observation status it was determined that the patient chief complaint was not chest pain but in fact the nausea and vomiting that has been a persistent problem for this patient  Patient was seen by Gastroenterology and it was determined the patient should have an outpatient workup to determine any etiology for her nausea and vomiting    Outpatient follow-up with Gastroenterology as well as primary care doctor and requested testing was ordered        Presenting Problem/History of Present Illness  Principal Problem:    Chest pain  Active Problems:    Essential hypertension    GERD (gastroesophageal reflux disease)    Hyperlipemia    Stage 3 chronic kidney disease    Osteoarthritis    Atrial fibrillation (HCC)  Resolved Problems:    * No resolved hospital problems  *          Discharge Condition: good      Discharge  Statement   I spent 45 minutes minutes discharging the patient  This time was spent on the day of discharge  I had direct contact with the patient on the day of discharge  Additional documentation is required if more than 30 minutes were spent on discharge

## 2018-01-02 NOTE — CONSULTS
Consultation - [facility]) [facility]  Ck Espinoza 68 y o  female MRN: [id number]  Unit/Bed#: 2 217-01 Encounter: [id number]        Reason for Consult / Principal Problem: Nausea, vomiting and diarrhea x 3 episodes    HPI: Frances Hdez is a 68year old female with a past medical history of atrial fibrillation on Xarelto, s/p pacemaker in 2009, CKD stage III, and HTN  Patient relays her son encouraged her to come to the ER for band-like chest pain that began on New Years Day  It was accompanied by nausea, non-bloody emesis and watery diarrhea  She reports up to 4 watery stools yesterday  Patient is currently undergoing cardiac work up  Troponin negative x 1; EKG revealed a ventricular paced rhythm with occasional supraventricular complexes  Patient will have inpatient stress test today  In terms of her nausea/vomiting and diarrhea, she reports two previous occurrences  The first in October that lasted for 1 day, which she attributed to an acute viral illness  A second identical episode then occurred in November  Patient is symptom free between episodes  She denies NSAID use or family history of GI malignancies such as esophogeal, gastric, pancreatic or colon cancer to her knowledge  She currently denies pyrosis, dysphagia, odynophagia, abdominal pain, nausea, vomiting, constipation, abdominal pain, hematochezia or melena  Last EGD: Many years ago in [facility]  Unable to recall why she had the procedure done  Last Colon: Per our records, last colonoscopy was May 2015 with Dr Miguel A Sheehan after removal of polyps that biopsied as tubular adenoma  Review of Systems:    CONSTITUTIONAL: Denies any fever, chills, or rigors  Good appetite, and no recent weight loss  HEENT: No earache or tinnitus  Denies hearing loss or visual disturbances  CARDIOVASCULAR: No chest pain or palpitations  RESPIRATORY: Denies any cough, hemoptysis, shortness of breath or dyspnea on exertion    GASTROINTESTINAL: As noted in the History of Present Illness  GENITOURINARY: No problems with urination  Denies any hematuria or dysuria  NEUROLOGIC: No dizziness or vertigo, denies headaches  MUSCULOSKELETAL: Denies any muscle or joint pain  SKIN: Denies skin rashes or itching  ENDOCRINE: Denies excessive thirst  Denies intolerance to heat or cold  PSYCHOSOCIAL: Denies depression or anxiety  Denies any recent memory loss  Historical Information   Past Medical History:   Diagnosis Date    A-fib ([facility] [address] )     Anemia     Arthritis     Heart murmur     High cholesterol     Hx of long term use of blood thinners     Hypertension     Irregular heart beat     Pacemaker     Shortness of breath      Past Surgical History:   Procedure Laterality Date    CARDIAC PACEMAKER PLACEMENT      1996 and april 2009    CARDIAC PACEMAKER PLACEMENT      CATARACT EXTRACTION W/ INTRAOCULAR LENS  IMPLANT, BILATERAL      COLONOSCOPY      LEG SURGERY Right     as a child after a fall    WI COMBINED ANT/POST COLPORRHAPHY N/A 3/17/2016    Procedure: COLPORRHAPHY ANTERIOR POSTERIOR ;  Surgeon: Petty Black MD;  [facility];  Service: Gynecology    WI CYSTOURETHROSCOPY N/A 3/17/2016    Procedure: Lila Amend;  Surgeon: Petty Black MD;  [facility];  Service: Gynecology    WI Kevyn Bough LIG N/A 3/17/2016    Procedure: COLPOPEXY VAGINAL EXTRAPERITONEAL (VEC) ANTERIOR ;  Surgeon: Petty Black MD;  [facility];  Service: Gynecology    WI SLING OPER STRES INCONTINENCE N/A 3/17/2016    Procedure: INSERTION PUBOVAGINAL SLING SINGLE INCISION ;  Surgeon: Petty Black MD;  [facility];  Service: Gynecology     Social History   History   Alcohol Use No     History   Drug Use No     History   Smoking Status    Never Smoker   Smokeless Tobacco    Not on file     History reviewed  No pertinent family history       Meds/Allergies     Prescriptions Prior to Admission Medication    acetaminophen (TYLENOL) 325 mg tablet    Calcium Carb-Cholecalciferol (CALCIUM 500+D3) 500-400 MG-UNIT TABS    cetirizine (ZyrTEC) 10 mg tablet    conjugated estrogens (PREMARIN) vaginal cream    Cyanocobalamin 1000 MCG/ML KIT    fluticasone (FLONASE) 50 mcg/act nasal spray    indapamide (LOZOL) 2 5 mg tablet    magnesium oxide (MAG-OX) 400 mg    nebivolol (BYSTOLIC) 5 mg tablet    olmesartan (BENICAR) 40 mg tablet    pantoprazole (PROTONIX) 40 mg tablet    rivaroxaban (XARELTO) tablet    spironolactone (ALDACTONE) 25 mg tablet    verapamil (CALAN) 120 mg tablet     Current Facility-Administered Medications   Medication Dose Route Frequency    acetaminophen (TYLENOL) tablet 325 mg  325 mg Oral Q6H PRN    calcium carbonate-vitamin D (OYSTER SHELL,OSCAL+D) 250 mg-125 units per tablet 1 tablet  1 tablet Oral Daily With Breakfast    conjugated estrogens (PREMARIN) vaginal cream 1 g  1 g Vaginal Daily    fluticasone (FLONASE) 50 mcg/act nasal spray 2 spray  2 spray Nasal Daily    indapamide (LOZOL) tablet 2 5 mg  2 5 mg Oral QAM    loratadine (CLARITIN) tablet 10 mg  10 mg Oral Daily    magnesium oxide (MAG-OX) tablet 800 mg  800 mg Oral Daily    nebivolol (BYSTOLIC) tablet 5 mg  5 mg Oral Daily    olmesartan (BENICAR) tablet 40 mg  40 mg Oral HS    pantoprazole (PROTONIX) EC tablet 40 mg  40 mg Oral HS    rivaroxaban (XARELTO) tablet 15 mg  15 mg Oral Daily    spironolactone (ALDACTONE) tablet 25 mg  25 mg Oral Daily    verapamil (CALAN-SR) CR tablet 120 mg  120 mg Oral Every Other Day       Allergies   Allergen Reactions    Other Itching     Bandaids, tape    redness         Objective     Blood pressure 105/55, pulse 69, temperature 97 6 °F (36 4 °C), temperature source Oral, resp  rate 18, height 5' 2" (1 575 m), weight 61 7 kg (136 lb), SpO2 95 %        Intake/Output Summary (Last 24 hours) at 01/02/18 1154  Last data filed at 01/02/18 0825   Gross per 24 hour   Intake 180 ml   Output                0 ml   Net              180 ml         PHYSICAL EXAM:      General Appearance:   Alert, cooperative, and in no respiratory distress   HEENT:   Normocephalic, atraumatic, anicteric      Neck:  Supple, symmetrical, trachea midline, no adenopathy;    thyroid: no enlargement/tenderness/nodules;    Lungs:   Clear to auscultation bilaterally; no rales, rhonchi or wheezing; respirations unlabored    Heart[de-identified]   S1 and S2 normal; regular rate and rhythm; no murmur, rub, or gallop  Abdomen:   Soft, non-tender, non-distended; normal bowel sounds; no masses, no organomegaly    Genitalia:   Deferred    Rectal:   Deferred    Extremities:  No cyanosis, clubbing or edema    Pulses:  2+ and symmetric all extremities    Skin:  Skin color, texture, turgor normal, no rashes or lesions    Lymph nodes:  No palpable cervical or supraclavicular lymphadenopathy        Lab Results:     Results from last 7 days  Lab Units 01/02/18  0522 01/01/18  1514   WBC Thousand/uL 6 02 17 48*   HEMOGLOBIN g/dL 12 0 12 9   HEMATOCRIT % 36 7 38 3   PLATELETS Thousands/uL 198 201   NEUTROS PCT %  --  89*   LYMPHS PCT %  --  3*   MONOS PCT %  --  8   EOS PCT %  --  0       Results from last 7 days  Lab Units 01/02/18  0522   SODIUM mmol/L 140   POTASSIUM mmol/L 3 9   CHLORIDE mmol/L 106   CO2 mmol/L 29   BUN mg/dL 21   CREATININE mg/dL 1 03   CALCIUM mg/dL 9 0   GLUCOSE RANDOM mg/dL 101               Imaging Studies: I have personally reviewed pertinent imaging studies  Xr Chest 2 Views    Result Date: 1/1/2018  Impression: No active pulmonary disease   Workstation performed: [id number]       ASSESSMENT and PLAN:    Principal Problem:    Chest pain  Active Problems:    Essential hypertension    GERD (gastroesophageal reflux disease)    Hyperlipemia    Stage 3 chronic kidney disease    Osteoarthritis    Atrial fibrillation (HCC)    1) Acute episode of nausea, vomiting and diarrhea - Patient has had similar episodes of this in the past year (3 in total)  Currently, the nausea and vomiting have resolved  Tolerating PO diet  She is having diarrhea  Patient is afebrile and without a WBC count  Unlikely bacterial etiology, may be viral vs chronic biliary dyskinesia vs IBS-D vs microscopic colitis vs cyclic vomiting syndrome  No overt bleeding  Hgb stable  I spoke to her primary team  We discussed having her follow-up as an outpatient since she is likely to be discharged today after stress testing  She will need EGD and RUQ U/S to continue work-up for noncardiac chest pain if stress test is inconclusive  She will also need stool testing to be performed as an outpatient before beginning anti-diarrheals  I recommend stool culture, O&P, and C  Diff PCR  She is also due for colonoscopy this year per history of tubular adenomatous polyps  Colonoscopy can also r/o microscopic colitis vs  IBD    - Printed orders for stool studies, placed in patient's chart for discharge   - Follow BRAT diet and encourage fluid intake until patient can be seen as outpatient  - Follow-up as outpatient for work-up as discussed above  2) Chest pain - Negative troponin x 1, negative EKG  Awaiting stress testing before discharge today? 3) GERD - Continue PPI QD  Needs outpatient EGD  The patient was seen and examined by Dr Miles Fernandez, all [place] medical decisions were made with Dr Miles Fernandez  Thank you for allowing us to participate in the care of this pleasant patient

## 2018-01-02 NOTE — DISCHARGE INSTRUCTIONS
Dolor torácico, cuidados ambulatorios   INFORMACIÓN GENERAL:   El dolor torácico  puede ser causado por wilver variedad de condiciones, algunas no badillo serias y Rouse Maxi que sí son mortales  También podría ser causado por un ataque cardíaco o un coágulo de vince en un pulmón  En ocasiones el dolor torácico o la presión en el pecho pueden ser el resultado de wilver maikol circulación de la vince al corazón (angina)  Wilver infección, inflamación o fractura en un hueso o cartílago en el tórax puede causar dolor o molestia  El dolor torácico también puede ser un síntoma de un problema digestivo, maye el reflujo gastroesofágico ([place]) o wilver úlcera estomacal    [place] siguientes son los síntomas más comunes:   · Chriss Fujita o sudoración     · Náuseas o vómito     · Falta del aire    · Yahoo o presión que se propaga del pecho a la espalda, mandíbula o brazo     · Ritmo cardíaco acelerado o lento     · Sensación de debilidad, cansancio o desmayo  Busque atención inmediata al presentar los siguientes síntomas:   · Alguno de los siguientes signos de un ataque cardíaco:      ¨ Estrechamiento, presión o dolor en el pecho que dura más de 5 minutos o regresa     ¨ Molestias o dolor en la espalda, [place], [place], estómago o brazo     ¨ Dificultad para respirar     ¨ Náuseas o vómito     ¨ Desvanecimiento o sudor frío y repentino, sobre todo con dificultad para respirar         · Incomodidad en el pecho que [place], aún con medicamentos    · Tos o vomita vince    · Deposiciones negras o con vince     · No puede dejar de vomitar o le duele al tragar  El tratamiento para el dolor torácico  puede incluir medicamentos para tratar ben síntomas mientras se determina la causa de addison dolor en el pecho  Usted podría necesitar alguno de los siguientes:  · Los antiplaquetarios , maye la aspirina, [place] [facility] a prevenir coagulas de [place]  [place] ben antiplaquetarios exactamente maye se le haya indicado   Estos medicamentos podrían causar mas probabilidad para sangrado y para desarrollar moretones  Si le maradiaga indicado el uso de aspirina, no tome acetaminofén o ibuprofeno en riojas lugar  · Le pueden recetar medicamento analgésico  para el dolor  Pregunte cuál es la cantidad que puede felipe de forma al  No fume:  Si usted fuma, nunca es tarde para dejar de fumar  El tabaquismo aumenta riojas riesgo de sufrir un ataque cardíaco y otras afecciones del corazón y el pulmón  Solicite a riojas proveedor de [employer] información si necesita ayuda para dejar de fumar  Acuda a la consulta de control con riojas proveedor de renan según le indicaron:  Es posible que le ordenen más exámenes  Lo pueden remitir con un especialista, maye el cardiólogo o el gastroenterólogo  Dana en wilver libreta las preguntas que tenga para que no olvide hacerlas karly ben citas médicas  ACUERDOS SOBRE RIOJAS CUIDADO:   Usted tiene el derecho de participar en la planificación de riojas cuidado  Aprenda todo lo que pueda sobre riojas condición y maye darle tratamiento  Discuta con ben médicos ben opciones de tratamiento para juntos decidir el cuidado que usted quiere recibir  Usted siempre tiene el derecho a rechazar riojas tratamiento  Esta información es sólo para uso en educación  Riojas intención no es darle un consejo médico sobre enfermedades o tratamientos  Colsulte con riojas Lar[place] Ferny farmacéutico antes de seguir cualquier régimen médico para saber si es seguro y efectivo para usted  © 2014 [address] is for End User's use only and may not be sold, redistributed or otherwise used for commercial purposes  All illustrations and images included in CareNotes® are the copyrighted property of A D A M , Inc  or Bishop Zacarias  Fibrilación auricular (Fib A)   LO QUE NECESITA SABER:   La Fib A podría ser intermitente o podría ser wilver condición de Deette Dural  La Fib A puede causar coágulos de vinec, accidente cerebrovascular o insuficiencia cardíaca   Estas condiciones pueden llegar a ser letales  Es importante tratar y controlar la Fib A para ayudar a evitar un coágulo de vince, un accidente cerebrovascular o la insuficiencia cardíaca  INSTRUCCIONES SOBRE EL MALIK HOSPITALARIA:   Llame al 911 en charlene de presentar lo siguiente:   · Usted tiene alguno de los siguientes signos de un ataque cardíaco:      ¨ Estornudos, presión, o dolor en addison pecho que dura mas de 5 minutos o regresa  ¨ Malestar o dolor en addison espalda, gideon, mandíbula, abdomen, o brazo     ¨ Dificultad para respirar    ¨ Náuseas o vómito    ¨ Siente un desvanecimiento o tiene sudores fríos especialmente en el pecho o dificultad para respirar  · Usted tiene alguno de los siguientes signos de derrame cerebral:      ¨ Adormecimiento o caída de un lado de addison juan alberto     ¨ Debilidad en un brazo o wilver pierna    ¨ Confusión o debilidad para hablar    ¨ Mareos o dolor de clement intenso, o pérdida de la visión  Regrese a la jamal de emergencias si:  Usted tiene alguno de los siguientes signos de un coágulo sanguíneo:  · Usted se siente mareado, le falta el [place] y le duele el pecho  · Usted expectora vince  · Usted tiene inflamación, enrojecimiento, dolor o calor en el brazo o la pierna  Comuníquese con addison cardiólogo o médico si:   · Addison ritmo cardíaco ideal no se encuentra en el rango que debe estar  · Usted tiene nueva inflamación en ben piernas, pies, tobillos o abdomen o esta ha empeorado  · Le falta el aire, incluso cuando está en reposo  · Usted tiene preguntas o inquietudes acerca de addison condición o cuidado  Medicamentos:  Es posible que usted necesite alguno de los siguientes:  · Los medicamentos para el corazón  ayudan a controlar la frecuencia y el ritmo cardíaco  Es posible que necesite más de un medicamento para tratar ben síntomas  · Los anticoagulantes    ayudan a prevenir la formación de coágulos  Unos ejemplos de anticoagulantes incluyen la heparina y Ervin Cohens   Los coágulos pueden ocasionar derrames cerebrales, ataques al corazón y [employer]  [place] son pautas generales de seguridad para seguir mientras está tomando un anticoagulante:    ¨ Esté pendiente de sangrados y moretones mientras esté tomando anticoagulantes  Esté atento a cualquier sangrado de las encías o nariz  Vigile que no haya vince en addison orina y movimientos intestinales  Use un paño o toalla suave para addison piel y un cepillo de dientes de cerdas suaves para cepillarse ben dientes  [place] puede evitar que addison piel o encías sangren  Si usted se afeita, use wilver rasuradora eléctrica  No practique deportes de contacto  ¨ Infórmele a addison odontólogo y a los médicos que lo atienden que usted caesar anticoagulantes  Lleve un brazalete o un collar que indique que usted caesar [employer]  ¨ No empiece ni suspenda ningún medicamento a menos que addison médico se lo indique  Muchos medicamentos no se pueden usar junto con los anticoagulantes  ¨ Infórmele a addison médico de inmediato si olvida felipe el medicamento o caesar demasiado  ¨ La warfarina  es un anticoagulante que podría  tener que felipe  Usted debería estar consiente de las siguientes cosas en charlene que usted tome Kapil Bohr  § Existen algunos alimentos y [place] que pueden afectar la cantidad de warfarina en addison vince  No realice cambios mayores a addison alimentación mientras caesar warfarina (warfarin)  La warfarina funciona mejor si usted come aproximadamente la misma cantidad de vitamina K todos los días  La vitamina K se encuentra en las hortalizas de hoja moise y algunos otros alimentos  Solicite más información acerca de lo que tiene que comer cuando usted caesar warfarina  § Usted necesitará acudir con addison médico para programar citas de seguimiento cuando usted esté tomando Kapil Bohr  Usted deberá hacerse análisis de vince periódicamente  Estos exámenes se usan para determinar la cantidad de medicamento que usted necesita      · Los medicamentos antiplaquetarios , maye la aspirina, [place] [facility] a prevenir coágulos de [place]  Se[place] ben antiplaquetarios exactamente maye se le haya indicado  Estos medicamentos podrían causar mas probabilidad para sangrado y para desarrollar moretones  Si le maradiaga indicado el uso de aspirina, no tome acetaminofén o ibuprofeno en addison lugar  · Se[place] ben medicamentos maye se le haya indicado  Consulte con addison médico si usted alicia que addison medicamento no le está ayudando o si presenta efectos secundarios  Infórmele si es alérgico a cualquier medicamento  Mantenga wilver lista actualizada de los [place], las vitaminas y los productos herbales que caesar  Incluya los siguientes datos de los medicamentos: cantidad, frecuencia y motivo de administración  Traiga con usted la lista o los envases de la píldoras a ben citas de seguimiento  Lleve la lista de los medicamentos con usted en charlene de wilver emergencia  Programe wilver wade con addison cardiólogo según le indiquen:  Usted deberá hacerse análisis de vince y someterse a observaciones periódicamente  Anote ben preguntas para que se acuerde de hacerlas karly ben visitas  Controle la Fib A:   · Conozca addison ritmo cardíaco ideal   Aprenda cómo tomarse el pulso y monitorear addison ritmo cardíaco     · Controle otras afecciones de [place]  [place] incluye la presión arterial guanakito, la apnea del sueño, la enfermedad de la tiroides, la diabetes y otras condiciones del corazón  Severy los [employer] se le haya indicado y siga addison plan de [place]  · Limite o no consuma bebidas alcohólicas  El alcohol puede empeorar el control de la Fib A  Pregunte a addison médico si usted puede felipe alcohol  Un trago equivale a 12 onzas de cerveza, 5 onzas de vino o 1 onza y ½ de licor  · No fume  La nicotina y otros químicos en los cigarrillos y cigarros pueden provocar daño al corazón y al pulmón  Pida información a addison médico si usted actualmente fuma y necesita ayuda para dejar de fumar   Los cigarrillos electrónicos o tabaco sin humo todavía contienen nicotina  Consulte con roach médico antes de [employer]  · Consuma alimentos saludables para el corazón  Los alimentos saludables para el corazón lo ayudarán a mantener roach colesterol bajo  Las frutas, verduras, panes integrales, productos lácteos bajos en grasa, frijoles, dilip magras y pescado son Ignacia [place] saludables para el corazón  Reemplace la mantequilla y la margarina con aceites saludables para el corazón maye el aceite de [place] y el aceite de [place]  · Mantenga un peso saludable  Consulte con roach médico cuánto debería pesar  Pida que le ayude a crear un plan para bajar de peso si usted tiene sobrepeso  · Thao ejercicio karly 30 minutos  la mayoría de los días de la [place]  Pregunte a roach médico acerca del mejor plan de ejercicio para usted  © 2017 [address] [facility] Information is for End User's use only and may not be sold, redistributed or otherwise used for commercial purposes  All illustrations and images included in CareNotes® are the copyrighted property of A D A M , Inc  or [address]  Esta información es sólo para uso en educación  Roach intención no es darle un consejo médico sobre enfermedades o tratamientos  Colsulte con roach Veronique Dhaval farmacéutico antes de seguir cualquier régimen médico para saber si es seguro y efectivo para usted  Enfermedad por reflujo gastroesofágico   LO QUE NECESITA SABER:   La enfermedad por reflujo gastroesofágico (Andrea Janette) ocurre cuando el ácido y los alimentos en el estómago regresan al esófago  La enfermedad por reflujo gastroesofágico es el reflujo que se produce más de [address] a la semana karly varias semanas  Generalmente causa acidez y otros síntomas  La ERGE puede causar otros problemas de renan con el tiempo si no es tratada         INSTRUCCIONES SOBRE EL MALIK HOSPITALARIA:   Regrese a la jamal de emergencias si:   · Usted siente Scott Catherine y no puede eructar o vomitar  · Usted siente dolor aime en el pecho y dificultad repentina para respirar  · Ben evacuaciones intestinales son de color felicity, con Christopher, o de apariencia alquitranada  · Addison vómito parece maye café molido o contiene vince  Pregúntele a addison Leamon Handsome vitaminas y minerales son adecuados para usted  · Vomita grandes cantidades, o vomita con frecuencia  · Tiene dificultad para respirar después de vomitar  · Tiene dificultad para tragar o dolor al tragar  · Usted pierde peso sin proponérselo  · Ben síntomas empeoran o no mejoran con el tratamiento  · Usted tiene preguntas o inquietudes acerca de addison condición o cuidado  Medicamentos:   · Medicamentos,  se utilizan para disminuir el ácido [place] medicamentos también podrían usarse para ayudar a que addison esfínter esofágico inferior y addison estómago se contraigan (estrechen) más  · Posey ben medicamentos maye se le haya indicado  Consulte con addison médico si usted alicia que addison medicamento no le está ayudando o si presenta efectos secundarios  Infórmele si es alérgico a algún medicamento  Mantenga wilver lista actualizada de los [place], las vitaminas y los productos herbales que caesar  Incluya los siguientes datos de los medicamentos: cantidad, frecuencia y motivo de administración  Traiga con usted la lista o los envases de la píldoras a ben citas de seguimiento  Lleve la lista de los medicamentos con usted en charlene de wilver emergencia  Control de la ERGE:   · No consuma alimentos o bebidas que puedan aumentar la [place]  Estos incluyen chocolate, menta, comidas fritas o grasosas, bebidas que contienen cafeína o bebidas gaseosas  Otros alimentos incluyen comidas picantes, cebollas, tomates y alimentos a base de tomate  No consuma alimentos y bebidas que puedan irritar addison esófago, maye las frutas cítricas, los jugos y las bebidas alcohólicas  · No ingiera comidas abundantes    Cuando usted come CS[employer] Security a la [place], [place] estómago necesita más ácido para digerirla  Consuma 6 comidas pequeñas al día en vez de 3 comidas grandes y coma lentamente  No consuma alimentos entre 2 y 3 horas antes de [place]  · Eleve la cabecera de roach cama  Coloque bloques de 6 pulgadas debajo de la cabecera de la estructura de roach cama  También podría usar más wilver almohada para apoyar roach clement y hombros mientras duerme  · Mantenga un peso saludable  Si usted tiene sobrepeso, la pérdida de peso podría ayudar a aliviar los síntomas de la [place]  · No fume  Fumar debilita el esfínter esofágico inferior y [place] el riesgo de Wsnsdfpmc-eièo-Ulsxnd  Pida información a roach médico si usted actualmente fuma y necesita ayuda para dejar de fumar  Los cigarrillos electrónicos o tabaco sin humo todavía contienen nicotina  Consulte con roach médico antes de [employer]  · No use ropa que [place] alrededor de la cintura  La ropa apretada puede ejercer presión [employer] y causar o empeorar los síntomas de la Hmbqveaxc-stèg-Iplbml  Acuda a ben consultas de control con roach médico según le indicaron  Anote ben preguntas para que se acuerde de hacerlas karly ben visitas  © 2017 [address]  Information is for End User's use only and may not be sold, redistributed or otherwise used for commercial purposes  All illustrations and images included in CareNotes® are the copyrighted property of A D A M , Inc  or Bishop Zacarias  Esta información es sólo para uso en educación  Roach intención no es darle un consejo médico sobre enfermedades o tratamientos  Colsulte con roach Caroline Barraza farmacéutico antes de seguir cualquier régimen médico para saber si es seguro y efectivo para usted

## 2018-01-04 LAB
ATRIAL RATE: 86 BPM
QRS AXIS: -57 DEGREES
QRSD INTERVAL: 170 MS
QT INTERVAL: 446 MS
QTC INTERVAL: 524 MS
T WAVE AXIS: 92 DEGREES
VENTRICULAR RATE: 83 BPM

## 2018-01-05 ENCOUNTER — HOSPITAL ENCOUNTER (OUTPATIENT)
Dept: NON INVASIVE DIAGNOSTICS | Facility: CLINIC | Age: 78
Discharge: HOME | End: 2018-01-05
Payer: COMMERCIAL

## 2018-01-05 ENCOUNTER — GENERIC CONVERSION - ENCOUNTER (OUTPATIENT)
Dept: OTHER | Facility: OTHER | Age: 78
End: 2018-01-05

## 2018-01-05 DIAGNOSIS — Z86.79 PERSONAL HISTORY OF CARDIOVASCULAR DISORDER: ICD-10-CM

## 2018-01-05 PROCEDURE — A9502 TC99M TETROFOSMIN: HCPCS

## 2018-01-05 PROCEDURE — 93017 CV STRESS TEST TRACING ONLY: CPT

## 2018-01-05 PROCEDURE — 78452 HT MUSCLE IMAGE SPECT MULT: CPT

## 2018-01-05 RX ADMIN — REGADENOSON 0.4 MG: 0.08 INJECTION, SOLUTION INTRAVENOUS at 09:31

## 2018-01-09 LAB
ARRHY DURING EX: NORMAL
CHEST PAIN STATEMENT: NORMAL
MAX DIASTOLIC BP: 64 MMHG
MAX HEART RATE: 81 BPM
MAX PREDICTED HEART RATE: 143 BPM
MAX. SYSTOLIC BP: 122 MMHG
PROTOCOL NAME: NORMAL
REASON FOR TERMINATION: NORMAL
TARGET HR FORMULA: NORMAL
TEST INDICATION: NORMAL
TIME IN EXERCISE PHASE: NORMAL

## 2018-01-09 NOTE — PROGRESS NOTES
History of Present Illness  HPI: Patient here for Nurse Visit for Vitamin B12 injection  Injection given as ordered by Dr Benjamin Zambrano on 10/12/15  Active Problems    1  Allergic rhinitis due to pollen (477 0) (J30 1)   2  Chronic atrial fibrillation (427 31) (I48 2)   3  Chronic kidney disease, stage 3 (585 3) (N18 3)   4  Cough (786 2) (R05)   5  Cystocele, midline (618 01) (N81 11)   6  Encounter for Holly catheter removal (V53 6) (Z46 6)   7  GERD without esophagitis (530 81) (K21 9)   8  History of atrial fibrillation (V12 59) (Z86 79)   9  Hyperlipidemia (272 4) (E78 5)   10  Hypertension (401 9) (I10)   11  Lateral epicondylitis, right elbow (726 32) (M77 11)   12  Left atrial enlargement (429 3) (I51 7)   13  Left renal artery stenosis (440 1) (I70 1)   14  Low back pain (724 2) (M54 5)   15  Lump or mass in breast (611 72) (N63)   16  Osteoarthritis of hip (715 95) (M16 9)   17  Osteoarthrosis, 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)   18  Osteopenia (733 90) (M85 80)   19  Pernicious anemia (281 0) (D51 0)   20  Preop examination (V72 84) (Z01 818)   21  Right knee pain (719 46) (M25 561)   22  Tubulovillous adenoma (229 9) (D36 9)   23  Vaginal wall prolapse (618 00) (N81 10)   24  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)   25  Visit for screening mammogram (V76 12) (Z12 31)   26  Vitamin B12 deficiency (266 2) (E53 8)   27  Weight loss (783 21) (R63 4)    Current Meds   1  Benicar 40 MG Oral Tablet; TAKE 1 TABLET DAILY; Therapy: 05Bmc4809 to (Last Rx:02Esj6059)  Requested for: 94KHF0417 Ordered   2  Bystolic 5 MG Oral Tablet; Therapy: (Recorded:14Nwi4237) to Recorded   3  Calcium 500 +D 500-400 MG-UNIT Oral Tablet; Therapy: (TZLBJVCR:93JDI7340) to Recorded   4  Cyanocobalamin 1000 MCG/ML Injection Solution; INJECT 1 ML INTRAMUSCULARLY   ONCE A MONTH; Last Rx:12Oct2015 Ordered   5  Indapamide 2 5 MG Oral Tablet; TAKE 1 TABLET DAILY; Therapy: 20NNJ6141 to (Evaluate:26Jun2016); Last Rx:10Gmn0066 Ordered   6   Magnesium Oxide 400 MG Oral Capsule; Take 2 daily; Therapy: 48TFX1326 to (Last Rx:18Feb2016)  Requested for: 04FHI0518 Ordered   7  Magnesium Oxide 400 MG Oral Capsule; TAKE AS DIRECTED; Therapy: 97OUH5938 to (Last Rx:45Kjb4004) Ordered   8  Pantoprazole Sodium 40 MG Oral Tablet Delayed Release; TAKE 1 TABLET BY MOUTH   ONCE DAILY; Therapy: 75HCC0339 to (Evaluate:61Vyr4577)  Requested for: 43Sio9555; Last   Rx:[id number] Ordered   9  Premarin 0 625 MG/GM Vaginal Cream; Aplique un poco de crema en el dedo y eche en   la vagina todas las noches por 2 semanas y despues wilver noche si y Radha Churches no  No use   aplicador; Therapy: 58JTD6355 to (Last Rx:50Xqy5094) Ordered   10  Spironolactone 25 MG Oral Tablet; Therapy: 17Gdm6957 to (Last Elmer Franklin)  Requested for: 32Pdt4630 Ordered   11  Verapamil HCl - 120 MG Oral Tablet; 1 tab po qod; Therapy: 62ZEF8626 to (Last Rx:18Feb2016) Ordered   12  Xarelto 15 MG Oral Tablet; Take 1 tablet daily; Therapy: 48PML7842 to (Evaluate:91Rhz9865)  Requested for: 06ALM6951; Last    Rx:04Jan2016 Ordered   13  ZyrTEC Allergy 10 MG Oral Capsule; take 1 capsule daily; Therapy: 96WRS6986 to (Evaluate:10Mar2016); Last Rx:12Oct2015 Ordered    Allergies    1  No Known Drug Allergies    2  Adhesive Tape   3  No Known Environmental Allergies    Plan  Vitamin B12 deficiency    · Cyanocobalamin 1000 MCG/ML Injection Solution    Future Appointments    Date/Time Provider Specialty Site   05/31/2016 09:40 AM Sherry Rodríguez MD Internal Medicine [address] [facility] PCP   05/04/2016 02:00 PM [facility] Nurse Schedule   [address] [facility] PCP   04/14/2016 01:00 PM Ping [address], [address] [email or web address]     Signatures   Electronically signed by :  Edwin Santillan, ; Mar 31 2016  1:44PM EST                       (Author)    Electronically signed by : Robyn Wheat MD; Mar 31 2016  2:10PM EST                       (Co-participant) Electronically signed by : KARLI Stoll ; Mar 31 2016  2:47PM EST                       (Co-author)

## 2018-01-10 NOTE — PROGRESS NOTES
Patient Health Assessment    Date:            07/14/2016  Blood Pressure:  126/69  Pulse:           80  Age:             75  Weight:          140 lbs  Height/Length:   5' 3"  Body Mass Index: 24 7  Provider:        [id number]  Clinic:          [facility] [address] [address]: Anemia    Heart Condition    High Blood Pressure    Pacemaker    Heart Surgery  Medications: Benicar HCT    Xarelto    Verapamil    Atorvastatin    Bystolic    Spironlactone    Pantoprazole Sodium    Magnesium    Unknow ( Patient to bring Medication list)  Allergies:  Since Last Visit: Medical Alert: No Change    Medications: No Change    Allergies:        No Change  Pain Scale Type: Numeric Pain ScalePain Level: 0  Description: Pt  presents for perio maintenance  Vertical BW taken  Oral  hygiene: poor  Removed plaque and calculus with hand instruments and  cavitron  Polished with prophy paste  Reviewed OHI      NV: 3 mo  maintenance    AH/Dr Sandeep Herrera    ----- Signed on Thursday, July 14, 2016 at 11:11:07 AM  -----  ----- Provider: [id number] - [facility], Dentist -- Clinic: [facility] -----

## 2018-01-10 NOTE — PROGRESS NOTES
Chief Complaint  PT  CAME INTO THE OFFICE TODAY FOR A NURSE VISIT FOR A B12 INJECTION ORDERED BY DR Marilyn Evans ON AUGUST 17, 2016  PT  BROUGHT HER OWN VIAL      Active Problems    1  Allergic rhinitis due to pollen (477 0) (J30 1)   2  Breast pain, right (611 71) (N64 4)   3  Chronic atrial fibrillation (427 31) (I48 2)   4  Chronic kidney disease, stage 3 (585 3) (N18 3)   5  Cough (786 2) (R05)   6  Cystocele, midline (618 01) (N81 11)   7  Encounter for Holly catheter removal (V53 6) (Z46 6)   8  Encounter for screening mammogram for breast cancer (V76 12) (Z12 31)   9  GERD without esophagitis (530 81) (K21 9)   10  History of atrial fibrillation (V12 59) (Z86 79)   11  Hyperlipidemia (272 4) (E78 5)   12  Hypertension (401 9) (I10)   13  Lateral epicondylitis, right elbow (726 32) (M77 11)   14  Left atrial enlargement (429 3) (I51 7)   15  Left renal artery stenosis (440 1) (I70 1)   16  Low back pain (724 2) (M54 5)   17  Lump or mass in breast (611 72) (N63)   18  Need for prophylactic vaccination and inoculation against influenza (V04 81) (Z23)   19  Need for vaccination with 13-polyvalent pneumococcal conjugate vaccine (V03 82) (Z23)   20  Onychomycosis of toenail (110 1) (B35 1)   21  Osteoarthritis of hip (715 95) (M16 9)   22  Osteoarthrosis, 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)   23  Osteopenia (733 90) (M85 80)   24  Pernicious anemia (281 0) (D51 0)   25  Right knee pain (719 46) (M25 561)   26  Seasonal affective disorder (296 99) (F39)   27  Shortness of breath (786 05) (R06 02)   28  Skin pustule (686 9) (L08 9)   29  Tubulovillous adenoma (229 9) (D36 9)   30  Vaginal wall prolapse (618 00) (N81 10)   31  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)   32  Vitamin B12 deficiency (266 2) (E53 8)    Current Meds   1  Benicar 40 MG Oral Tablet; TAKE 1 TABLET DAILY; Therapy: 18Jns4768 to (Last Rx:74Mnz8917)  Requested for: 00GZG8530 Ordered   2   Benzonatate 100 MG Oral Capsule; TAKE 1 CAPSULE 3 TIMES DAILY AS NEEDED; Therapy: 11MJF9231 to (Evaluate:76Ine3657)  Requested for: 82LDV0534; Last   Rx:51Nkz6768 Ordered   3  Bystolic 5 MG Oral Tablet; Therapy: (Recorded:84Rix1976) to Recorded   4  Calcium 500 +D 500-400 MG-UNIT Oral Tablet; Therapy: (JKTFGFES:60EJD8494) to Recorded   5  Ciclopirox 8 % External Solution; APPLY AND GENTLY MASSAGE INTO AFFECTED   AREA(S) TWICE DAILY; Therapy: 28OWW6678 to (Last Rx:19Pvc2879) Ordered   6  Cyanocobalamin 1000 MCG/ML Injection Solution; INJECT 1 ML INTRAMUSCULARLY   ONCE A MONTH  Requested for: 17Xne0550; Last Rx:99Ohx1731 Ordered   7  Fluticasone Propionate 50 MCG/ACT Nasal Suspension; instill 2 sprays into each nostril   once daily; Therapy: 81ZWF4092 to (Jaydon Alford)  Requested for: 57GVC7700; Last   Rx:78Vla4727 Ordered   8  Indapamide 2 5 MG Oral Tablet; TAKE 1 TABLET DAILY; Therapy: 57MGM2849 to (Evaluate:31Obn4897); Last Rx:71Mid4011 Ordered   9  Magnesium Oxide 400 MG Oral Capsule; Take 2 daily; Therapy: 64XAC7088 to (Last Rx:72Jme1079)  Requested for: 01XWT7508 Ordered   10  Magnesium Oxide 400 MG Oral Capsule; TAKE AS DIRECTED; Therapy: 87YKS0895 to (Last Rx:51Clr1837) Ordered   11  Pantoprazole Sodium 40 MG Oral Tablet Delayed Release; TAKE 1 TABLET BY MOUTH    ONCE DAILY; Therapy: 21RTE8732 to ([place] )  Requested for: 88UIZ6599; Last    Rx:55Eeg5587 Ordered   12  Premarin 0 625 MG/GM Vaginal Cream; Aplique un poco de crema en el dedo y eche en    la vagina todas las noches por 2 semanas y despues wilver noche si y Rosalita Shackle no  No use    aplicador; Therapy: 95NGG9891 to (Last Rx:36Wfj9567) Ordered   13  Spironolactone 25 MG Oral Tablet; Therapy: 76Rew9856 to (Last Maico Bullard)  Requested for: 78Vol6012 Ordered   14  Verapamil HCl - 120 MG Oral Tablet; 1 tab po qod; Therapy: 59RTO2426 to (Last Rx:18Feb2016) Ordered   15  Xarelto 15 MG Oral Tablet; Take 1 tablet daily;     Therapy: 50MPF1583 to (Evaluate:10Nov2017)  Requested for: 48MCD9305; Last    Rx:63Htm7101 Ordered   16  ZyrTEC Allergy 10 MG Oral Capsule; take 1 capsule daily; Therapy: 36NAT7481 to (Evaluate:28Oct2016)  Requested for: 63LSF2239; Last    Rx:76Dnt1461 Ordered    Allergies    1  No Known Drug Allergies    2  Adhesive Tape   3   No Known Environmental Allergies    Plan  Vitamin B12 deficiency    · Cyanocobalamin 1000 MCG/ML Injection Solution    Future Appointments    Date/Time Provider Specialty Site   01/17/2017 10:00 AM [place], PCP Nurse Schedule  [address] PCP   12/30/2016 01:20 PM Specialty Clinic, Podiatry  [facility]   05/25/2017 01:00 PM [facility], [address]     Signatures   Electronically signed by : Charity Erickson, ; Dec 13 2016 10:18AM EST                       (Author)    Electronically signed by : Mirna Farias DO; Dec 13 2016 12:18PM EST                       (Author)    Electronically signed by : Alfonso De Los Santos DO; Dec 14 2016 11:26AM EST                       (Review)

## 2018-01-11 NOTE — PROGRESS NOTES
Chief Complaint  Pt  came into office for B12 injection today as ordered by Dr Giovanni Kwong on 8/17/16  Injection given and documented and pt  will return in 1 month for next injection  Pt  brought vial of B12 from pharmacy  Pt  given flu shot today per standing order  Active Problems    1  Allergic rhinitis due to pollen (477 0) (J30 1)   2  Chest pain on breathing (786 52) (R07 1)   3  Chronic atrial fibrillation (427 31) (I48 2)   4  Chronic kidney disease, stage 3 (585 3) (N18 3)   5  Cough (786 2) (R05)   6  Cystocele, midline (618 01) (N81 11)   7  Encounter for Holly catheter removal (V53 6) (Z46 6)   8  GERD without esophagitis (530 81) (K21 9)   9  History of atrial fibrillation (V12 59) (Z86 79)   10  Hyperlipidemia (272 4) (E78 5)   11  Hypertension (401 9) (I10)   12  Lateral epicondylitis, right elbow (726 32) (M77 11)   13  Left atrial enlargement (429 3) (I51 7)   14  Left renal artery stenosis (440 1) (I70 1)   15  Low back pain (724 2) (M54 5)   16  Lump or mass in breast (611 72) (N63)   17  Need for prophylactic vaccination and inoculation against influenza (V04 81) (Z23)   18  Need for vaccination with 13-polyvalent pneumococcal conjugate vaccine (V03 82) (Z23)   19  Onychomycosis of toenail (110 1) (B35 1)   20  Osteoarthritis of hip (715 95) (M16 9)   21  Osteoarthrosis, 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)   22  Osteopenia (733 90) (M85 80)   23  Pernicious anemia (281 0) (D51 0)   24  Preop examination (V72 84) (Z01 818)   25  Right knee pain (719 46) (M25 561)   26  Seasonal affective disorder (296 99) (F39)   27  Shortness of breath (786 05) (R06 02)   28  Skin pustule (686 9) (L08 9)   29  Tubulovillous adenoma (229 9) (D36 9)   30  Vaginal wall prolapse (618 00) (N81 10)   31  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)   32  Viral bronchitis (466 0) (J20 8)   33  Visit for screening mammogram (V76 12) (Z12 31)   34  Vitamin B12 deficiency (266 2) (E53 8)   35  Weight loss (783 21) (R63 4)    Current Meds   1  Benicar 40 MG Oral Tablet; TAKE 1 TABLET DAILY; Therapy: 60Wan3917 to (Last Rx:71Qax6310)  Requested for: 02WWG3079 Ordered   2  Benzonatate 100 MG Oral Capsule; TAKE 1 CAPSULE 3 TIMES DAILY AS NEEDED; Therapy: 91MBB4807 to (Evaluate:58Dyp5762)  Requested for: 24JDS9230; Last   Rx:20Grx1652 Ordered   3  Bystolic 5 MG Oral Tablet; Therapy: (Recorded:66Npv7509) to Recorded   4  Calcium 500 +D 500-400 MG-UNIT Oral Tablet; Therapy: (PAKYKXMS:14VIL0125) to Recorded   5  Ciclopirox 8 % External Solution; APPLY AND GENTLY MASSAGE INTO AFFECTED   AREA(S) TWICE DAILY; Therapy: 81UNP4213 to (Last Rx:63Hul3583) Ordered   6  Cyanocobalamin 1000 MCG/ML Injection Solution; INJECT 1 ML INTRAMUSCULARLY   ONCE A MONTH  Requested for: 22Lox4049; Last Rx:67Dsx8906 Ordered   7  Fluticasone Propionate 50 MCG/ACT Nasal Suspension; instill 2 sprays into each nostril   once daily; Therapy: 81CUC6069 to (May Essence)  Requested for: 69TEO0121; Last   Rx:25Eys8548 Ordered   8  Indapamide 2 5 MG Oral Tablet; TAKE 1 TABLET DAILY; Therapy: 82NSI5388 to (Evaluate:74Smb6150); Last Rx:92Bix3022 Ordered   9  Magnesium Oxide 400 MG Oral Capsule; Take 2 daily; Therapy: 24DLY3628 to (Last Rx:73Zkc2907)  Requested for: 14MDQ2958 Ordered   10  Magnesium Oxide 400 MG Oral Capsule; TAKE AS DIRECTED; Therapy: 26JCR3637 to (Last Rx:83Afq7220) Ordered   11  Pantoprazole Sodium 40 MG Oral Tablet Delayed Release; TAKE 1 TABLET BY MOUTH    ONCE DAILY; Therapy: 52TNP9599 to (Thom Alicia)  Requested for: 33OSK2215; Last    Rx:62Mki1311 Ordered   12  Premarin 0 625 MG/GM Vaginal Cream; Aplique un poco de crema en el dedo y eche en    la vagina todas las noches por 2 semanas y despues wilver noche si y Cheyenne Sicks no  No use    aplicador; Therapy: 71QJP3039 to (Last Rx:26Uls1211) Ordered   13  Spironolactone 25 MG Oral Tablet; Therapy: 28Ttb9894 to (Last Ashley Paci)  Requested for: 20Feb2012 Ordered   14   Verapamil HCl - 120 MG Oral Tablet; 1 tab po qod; Therapy: 44GQE6003 to (Last Rx:60Gro3234) Ordered   15  Xarelto 15 MG Oral Tablet; Take 1 tablet daily; Therapy: 20ONT7672 to (Evaluate:38Aae4068)  Requested for: 63RDW5539; Last    Rx:04Jan2016 Ordered   16  ZyrTEC Allergy 10 MG Oral Capsule; take 1 capsule daily; Therapy: 15OSC2867 to (Evaluate:28Oct2016)  Requested for: 19XPN2173; Last    Rx:51Czi8815 Ordered    Allergies    1  No Known Drug Allergies    2  Adhesive Tape   3   No Known Environmental Allergies    Plan  Need for prophylactic vaccination and inoculation against influenza    · Flulaval Quadrivalent 0 5 ML Intramuscular Suspension  Vitamin B12 deficiency    · Cyanocobalamin 1000 MCG/ML Injection Solution    Future Appointments    Date/Time Provider Specialty Site   11/14/2016 10:30 AM [place], PCP Nurse Schedule  [address] PCP   12/30/2016 01:20 PM Specialty Clinic, Podiatry  [place] [id number]   11/15/2016 09:35 AM Sherly Anthony DO Internal Medicine [address] PCP   05/25/2017 01:00 PM [facility], [address]     Signatures   Electronically signed by : Alla Cordova, ; Oct  6 2016  2:52PM EST                       (Author)    Electronically signed by : Eloisa Carrillo DO; Oct  7 2016  8:58AM EST                       (Author)    Electronically signed by : Tegan Downing DO; Oct  7 2016  9:12AM EST                       (Review)

## 2018-01-12 ENCOUNTER — TRANSCRIBE ORDERS (OUTPATIENT)
Dept: LAB | Facility: HOSPITAL | Age: 78
End: 2018-01-12

## 2018-01-12 VITALS
HEART RATE: 92 BPM | TEMPERATURE: 97.5 F | BODY MASS INDEX: 24.75 KG/M2 | WEIGHT: 134.48 LBS | SYSTOLIC BLOOD PRESSURE: 110 MMHG | HEIGHT: 62 IN | DIASTOLIC BLOOD PRESSURE: 62 MMHG

## 2018-01-12 DIAGNOSIS — R19.7 DIARRHEA, UNSPECIFIED TYPE: Primary | ICD-10-CM

## 2018-01-12 NOTE — PROGRESS NOTES
Patient Health Assessment    Date:            10/17/2016  Blood Pressure:  138/73  Pulse:           80  Age:             68  Weight:          140 lbs  Height/Length:   5' 3"  Body Mass Index: 24 7  Provider:        [email or web address]  Clinic:          [place]      RECALL EXAM AND PERIO MAINTENANCE  REV MED HX :  pt reports no new changes or meds  Medical Alert: Anemia    Heart Condition    High Blood Pressure    Pacemaker    Heart Surgery  Medications: Benicar HCT    Xarelto    Verapamil    Atorvastatin    Bystolic    Spironlactone    Pantoprazole Sodium    Magnesium    Unknow ( Patient to bring Medication list)  Allergies:  Since Last Visit: Medical Alert: No Change    Medications: No Change    Allergies:        No Change  Pain Scale Type: Numeric Pain ScalePain Level: 0  Description: no dental pain or concerns  cavitroned, scaled, polished and flossed  POOR ORAL HYGIENE, HEAVY PLAQUE,CALCULUS AND BLEEDING GENERALIZED  GINGIVA INFLAMMED AND IN POOR CONDITION  Dr Swati Gonzalez exam- recommend reappt pt in 3 months for perio probing and  re-eval for sc/rp again  nv= 3 month with a dr for perio probing and eval for sc /rp    ----- Signed on Monday, October 17, 2016 at 10:59:41 AM  -----  ----- Provider: [id number] - Antwan Masters RD -- Clinic: [facility] -----

## 2018-01-12 NOTE — PROGRESS NOTES
Chief Complaint  Pt came for nurse appt  and was given the B-12 1ml- IM injection as ordered monthly  Active Problems    1  Allergic rhinitis due to pollen (477 0) (J30 1)   2  Chronic atrial fibrillation (427 31) (I48 2)   3  Chronic kidney disease, stage 3 (585 3) (N18 3)   4  Cough (786 2) (R05)   5  Cystocele, midline (618 01) (N81 11)   6  GERD without esophagitis (530 81) (K21 9)   7  History of atrial fibrillation (V12 59) (Z86 79)   8  Hyperlipidemia (272 4) (E78 5)   9  Hypertension (401 9) (I10)   10  Lateral epicondylitis, right elbow (726 32) (M77 11)   11  Left atrial enlargement (429 3) (I51 7)   12  Left renal artery stenosis (440 1) (I70 1)   13  Low back pain (724 2) (M54 5)   14  Lump or mass in breast (611 72) (N63)   15  Osteoarthritis of hip (715 95) (M16 9)   16  Osteoarthrosis, 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)   17  Osteopenia (733 90) (M85 80)   18  Pernicious anemia (281 0) (D51 0)   19  Preop examination (V72 84) (Z01 818)   20  Right knee pain (719 46) (M25 561)   21  Tubulovillous adenoma (229 9) (D36 9)   22  Vaginal wall prolapse (618 00) (N81 10)   23  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)   24  Visit for screening mammogram (V76 12) (Z12 31)   25  Vitamin B12 deficiency (266 2) (E53 8)   26  Weight loss (783 21) (R63 4)    Current Meds   1  Benicar 40 MG Oral Tablet; TAKE 1 TABLET DAILY; Therapy: 94Nrc9892 to (Last Rx:17Yby3966)  Requested for: 45DVK9238 Ordered   2  Bystolic 5 MG Oral Tablet; Therapy: (Recorded:79Czz7556) to Recorded   3  Calcium 500 +D 500-400 MG-UNIT Oral Tablet; Therapy: (ZEYHFWFO:04JJS1735) to Recorded   4  Cyanocobalamin 1000 MCG/ML Injection Solution; INJECT 1 ML INTRAMUSCULARLY   ONCE A MONTH; Last Rx:27Rqw0111 Ordered   5  Indapamide 2 5 MG Oral Tablet; TAKE 1 TABLET DAILY; Therapy: 56ZQE9192 to (Evaluate:26Jun2016); Last Rx:19Gja7922 Ordered   6  Magnesium Oxide 400 MG Oral Capsule; Take 2 daily;    Therapy: 40DMU0243 to (Last Rx:18Feb2016)  Requested for: 92BPJ6705 Ordered   7  Magnesium Oxide 400 MG Oral Capsule; TAKE AS DIRECTED; Therapy: 70JLD5206 to (Last Rx:05Ojz1163) Ordered   8  Pantoprazole Sodium 40 MG Oral Tablet Delayed Release; TAKE 1 TABLET BY MOUTH   ONCE DAILY; Therapy: 15GXO1501 to (Evaluate:98Wph4694)  Requested for: 09Ief5570; Last   Rx:40Cqu4384 Ordered   9  Premarin 0 625 MG/GM Vaginal Cream; Aplique un poco de crema en el dedo y eche en   la vagina todas las noches por 2 semanas y despues wilver noche si y Radha Churches no  No use   aplicador; Therapy: 35UOL7210 to (Last Rx:89Fcn8157) Ordered   10  Spironolactone 25 MG Oral Tablet; Therapy: 79Qsg9966 to (Last Corinda Noemi)  Requested for: 77Rie9767 Ordered   11  Verapamil HCl - 120 MG Oral Tablet; 1 tab po qod; Therapy: 26OQF6000 to (Last Rx:18Feb2016) Ordered   12  Xarelto 15 MG Oral Tablet; Take 1 tablet daily; Therapy: 88JHN9390 to (Evaluate:89Czz5049)  Requested for: 90OBQ9310; Last    Rx:04Jan2016 Ordered   13  ZyrTEC Allergy 10 MG Oral Capsule; take 1 capsule daily; Therapy: 94MPH6436 to (Evaluate:10Mar2016); Last Rx:83Rrr4416 Ordered    Allergies    1  No Known Drug Allergies    2  Adhesive Tape   3   No Known Environmental Allergies    Plan  Vitamin B12 deficiency    · Cyanocobalamin 1000 MCG/ML Injection Solution    Future Appointments    Date/Time Provider Specialty Site   05/31/2016 09:40 AM Sherry Rodríguez MD Internal Medicine [address] PCP   03/31/2016 02:00 PM [facility] Nurse Schedule  [address] PCP     Signatures   Electronically signed by : Kaitlyn Wilkerson, ; Feb 26 2016  1:53PM EST                       (Author)    Electronically signed by : Robyn Wheat MD; Feb 26 2016  1:56PM EST                       (Co-participant)    Electronically signed by : Haroldo Owen DO; Feb 26 2016  2:20PM EST                       (Review)

## 2018-01-12 NOTE — PROGRESS NOTES
Chief Complaint  PT  CAME INTO THE OFFICE FOR A NURSE VISIT FOR A B12 INJECTION ORDERED BY DR Maritza Suh ON AUGUST 17, 2016  PT  BROUGHT HER OWN SERUM      Active Problems    1  Allergic rhinitis due to pollen (477 0) (J30 1)   2  Breast pain, right (611 71) (N64 4)   3  Chronic atrial fibrillation (427 31) (I48 2)   4  Chronic kidney disease, stage 3 (585 3) (N18 3)   5  Cough (786 2) (R05)   6  Cystocele, midline (618 01) (N81 11)   7  Encounter for Holly catheter removal (V53 6) (Z46 6)   8  Encounter for screening mammogram for breast cancer (V76 12) (Z12 31)   9  GERD without esophagitis (530 81) (K21 9)   10  History of atrial fibrillation (V12 59) (Z86 79)   11  Hyperlipidemia (272 4) (E78 5)   12  Hypertension (401 9) (I10)   13  Lateral epicondylitis, right elbow (726 32) (M77 11)   14  Left atrial enlargement (429 3) (I51 7)   15  Left renal artery stenosis (440 1) (I70 1)   16  Low back pain (724 2) (M54 5)   17  Lump or mass in breast (611 72) (N63)   18  Need for prophylactic vaccination and inoculation against influenza (V04 81) (Z23)   19  Need for vaccination with 13-polyvalent pneumococcal conjugate vaccine (V03 82) (Z23)   20  Onychomycosis of toenail (110 1) (B35 1)   21  Osteoarthritis of hip (715 95) (M16 9)   22  Osteoarthrosis, 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)   23  Osteopenia (733 90) (M85 80)   24  Pernicious anemia (281 0) (D51 0)   25  Right knee pain (719 46) (M25 561)   26  Seasonal affective disorder (296 99) (F39)   27  Shortness of breath (786 05) (R06 02)   28  Skin pustule (686 9) (L08 9)   29  Tubulovillous adenoma (229 9) (D36 9)   30  Vaginal wall prolapse (618 00) (N81 10)   31  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)   32  Vitamin B12 deficiency (266 2) (E53 8)    Current Meds   1  Benicar 40 MG Oral Tablet; TAKE 1 TABLET DAILY; Therapy: 50Iyv9729 to (Last Rx:17Twk3327)  Requested for: 74DCB0896 Ordered   2   Benzonatate 100 MG Oral Capsule; TAKE 1 CAPSULE 3 TIMES DAILY AS NEEDED; Therapy: 64WHB3280 to (Evaluate:48Bvr5914)  Requested for: 26MWG2552; Last   Rx:46Fiw3764 Ordered   3  Bystolic 5 MG Oral Tablet; Therapy: (Recorded:06Rot3523) to Recorded   4  Calcium 500 +D 500-400 MG-UNIT Oral Tablet; Therapy: (NGFLJBGD:22OVN4810) to Recorded   5  Ciclopirox 8 % External Solution; APPLY AND GENTLY MASSAGE INTO AFFECTED   AREA(S) TWICE DAILY; Therapy: 14TUF5662 to (Last Rx:70Jou5453) Ordered   6  Cyanocobalamin 1000 MCG/ML Injection Solution; INJECT 1 ML INTRAMUSCULARLY   ONCE A MONTH  Requested for: 17Aug2016; Last Rx:83Xew5750 Ordered   7  Fluticasone Propionate 50 MCG/ACT Nasal Suspension; instill 2 sprays into each nostril   once daily; Therapy: 69CXG2780 to (Tere Guzmán)  Requested for: 62NMG6558; Last   Rx:31Dub5106 Ordered   8  Indapamide 2 5 MG Oral Tablet; TAKE 1 TABLET DAILY; Therapy: 88TDE3445 to (Evaluate:67Ecw5686); Last Rx:27Fcs3187 Ordered   9  Magnesium Oxide 400 MG Oral Capsule; Take 2 daily; Therapy: 93FMR9313 to (Last Rx:85Onr7762)  Requested for: 53GLA5776 Ordered   10  Magnesium Oxide 400 MG Oral Capsule; TAKE AS DIRECTED; Therapy: 31CBL0684 to (Last Rx:09Fth3681) Ordered   11  Pantoprazole Sodium 40 MG Oral Tablet Delayed Release; TAKE 1 TABLET BY MOUTH    ONCE DAILY; Therapy: 99IAF3006 to (67 488 45 07)  Requested for: 49AOT2717; Last    Rx:44Cqc9856 Ordered   12  Premarin 0 625 MG/GM Vaginal Cream; Aplique un poco de crema en el dedo y eche en    la vagina todas las noches por 2 semanas y despues wilver noche si y Umesh Damian no  No use    aplicador; Therapy: 65RJG4467 to (Last Rx:25Alk0252) Ordered   13  Spironolactone 25 MG Oral Tablet; Therapy: 03Vlh1255 to (Last [facility])  Requested for: 58Qgx9853 Ordered   14  Verapamil HCl - 120 MG Oral Tablet; 1 tab po qod; Therapy: 03GIC3551 to (Last Rx:18Feb2016) Ordered   15  Xarelto 15 MG Oral Tablet; Take 1 tablet daily;     Therapy: 16KON7417 to (Evaluate:10Nov2017)  Requested for: 63IGI8511; Last    Rx:39Suy5178 Ordered   16  ZyrTEC Allergy 10 MG Oral Capsule; take 1 capsule daily; Therapy: 51NEJ7261 to (Evaluate:28Oct2016)  Requested for: 01ROM2766; Last    Rx:64Cnq8528 Ordered    Allergies    1  No Known Drug Allergies    2  Adhesive Tape   3   No Known Environmental Allergies    Plan  Left renal artery stenosis, Pernicious anemia, Vitamin B12 deficiency    · Cyanocobalamin 1000 MCG/ML Injection Solution    Future Appointments    Date/Time Provider Specialty Site   02/14/2017 10:30 AM [place], [facility] Nurse Schedule  [address] PCP   01/20/2017 02:00 PM Specialty Clinic, Podiatry  [facility]   05/17/2017 09:05 AM Sveta Arora DO Internal Medicine [address] PCP   05/25/2017 01:00 PM [facility], [place] Room  [facility]     Signatures   Electronically signed by : King Lopez, ; Jan 17 2017  9:34AM EST                       (Author)    Electronically signed by : LAUREANO Harkins DO; Jan 18 2017  5:44PM EST                       (Acknowledgement)    Electronically signed by : KARLI Martin ; Jan 19 2017  7:27AM EST                       (Author)

## 2018-01-13 VITALS
DIASTOLIC BLOOD PRESSURE: 64 MMHG | WEIGHT: 139.33 LBS | BODY MASS INDEX: 25.64 KG/M2 | HEART RATE: 84 BPM | TEMPERATURE: 97.7 F | HEIGHT: 62 IN | SYSTOLIC BLOOD PRESSURE: 122 MMHG

## 2018-01-13 NOTE — PROGRESS NOTES
History of Present Illness  HPI: Patient here for nurse visit for Vitamin B12 injections 1000mcg/mL as ordered by Dr Elisabeth Hernandez on 10/15/15  Injections given and patient will RTC in one month  Active Problems    1  Allergic rhinitis due to pollen (477 0) (J30 1)   2  Chronic atrial fibrillation (427 31) (I48 2)   3  Chronic kidney disease, stage 3 (585 3) (N18 3)   4  Cough (786 2) (R05)   5  Cystocele, midline (618 01) (N81 11)   6  GERD without esophagitis (530 81) (K21 9)   7  History of atrial fibrillation (V12 59) (Z86 79)   8  Hyperlipidemia (272 4) (E78 5)   9  Hypertension (401 9) (I10)   10  Lateral epicondylitis, right elbow (726 32) (M77 11)   11  Left atrial enlargement (429 3) (I51 7)   12  Left renal artery stenosis (440 1) (I70 1)   13  Low back pain (724 2) (M54 5)   14  Lump or mass in breast (611 72) (N63)   15  Osteoarthritis of hip (715 95) (M16 9)   16  Osteoarthrosis, 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)   17  Osteopenia (733 90) (M85 80)   18  Pernicious anemia (281 0) (D51 0)   19  Right knee pain (719 46) (M25 561)   20  Tubulovillous adenoma (229 9) (D36 9)   21  Vaginal wall prolapse (618 00) (N81 10)   22  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)   23  Visit for screening mammogram (V76 12) (Z12 31)   24  Vitamin B12 deficiency (266 2) (E53 8)   25  Weight loss (783 21) (R63 4)    Current Meds   1  Atorvastatin Calcium 20 MG Oral Tablet; TAKE 1 TABLET DAILY AT BEDTIME; Therapy: 92Yty7035 to (Evaluate:21Oct2016)  Requested for: 27Oct2015; Last   Rx:27Oct2015 Ordered   2  Benicar 40 MG Oral Tablet; TAKE 1 TABLET DAILY; Therapy: 05Oqt1284 to (Last Rx:89Rso6520)  Requested for: 28FXT0305 Ordered   3  Bystolic 10 MG Oral Tablet; Therapy: (GRGRCHJY:34OOC5965) to Recorded   4  Calcium 500 +D 500-400 MG-UNIT Oral Tablet; Therapy: (GWWKRVVO:87FPP0293) to Recorded   5  Cyanocobalamin 1000 MCG/ML Injection Solution; INJECT 1 ML INTRAMUSCULARLY   ONCE A MONTH; Last Rx:12Oct2015 Ordered   6   Indapamide 2 5 MG Oral Tablet; TAKE 1 TABLET DAILY; Therapy: 74MET6498 to (Evaluate:26Jun2016); Last Rx:11Mrs4155 Ordered   7  Magnesium Oxide 400 MG Oral Capsule; TAKE AS DIRECTED; Therapy: 55VYL0748 to (Last Rx:07Yko1384) Ordered   8  Pantoprazole Sodium 40 MG Oral Tablet Delayed Release; TAKE 1 TABLET BY MOUTH   ONCE DAILY; Therapy: 86LLI0762 to (Evaluate:74Opz5358)  Requested for: 30Itu6509; Last   Rx:00Wcl2185 Ordered   9  Premarin 0 625 MG/GM Vaginal Cream; Aplique un poco de crema en el dedo y eche en   la vagina todas las noches por 2 semanas y despues wilver noche si y [place] no  No use   aplicador; Therapy: 59NCC6560 to (Last Rx:88Bgv2017) Ordered   10  Spironolactone 25 MG Oral Tablet; Therapy: 98Amu5467 to (Last Kristi Milagro)  Requested for: 83Opn4620 Ordered   11  Tylenol 8 Hour 650 MG Oral Tablet Extended Release; TAKE 1 TABLET 4 TIMES DAILY    AS NEEDED; Therapy: 09YQJ4888 to (Evaluate:41Aep1065)  Requested for: [phone]; Last    Rx:27Oct2015 Ordered   12  Xarelto 15 MG Oral Tablet; Take 1 tablet daily; Therapy: 70SKI4848 to (Evaluate:51Tbx8729)  Requested for: 73OAH0331; Last    Rx:04Jan2016 Ordered   13  ZyrTEC Allergy 10 MG Oral Capsule; take 1 capsule daily; Therapy: 49RIW9834 to (Evaluate:10Mar2016); Last Rx:12Oct2015 Ordered    Allergies    1  No Known Drug Allergies    2  Adhesive Tape   3  No Known Environmental Allergies    Plan  Vitamin B12 deficiency    · Cyanocobalamin 1000 MCG/ML Injection Solution    Future Appointments    Date/Time Provider Specialty Site   02/18/2016 09:20 AM Jaydon Jeffrey MD Internal Medicine [address] [facility] PCP   02/26/2016 02:00 PM [place], PCP Nurse Schedule  ST [address] [place] [address] PCP   02/25/2016 12:45 PM [address], Urogyn Room  [facility] [address]  [facility]     Signatures   Electronically signed by :  Dilan Lema, ; Jan 26 2016 10:25AM EST                       (Author)    Electronically signed by : Stephanie Garcia Mary Granado MD; Jan 27 2016  2:54PM EST                       (Co-participant)    Electronically signed by : KARLI Bonilla ; Jan 28 2016  9:01AM EST                       (Author)

## 2018-01-13 NOTE — PROGRESS NOTES
Patient Health Assessment    Date:            03/06/2017  Blood Pressure:  126/69  Pulse:           80  Age:             68  Weight:          140 lbs  Height/Length:   5' 3"  Body Mass Index: 24 7  Provider:        STEPHANIE  Clinic:          [facility]: Anemia    Heart Condition    High Blood Pressure    Pacemaker    Heart Surgery  Medications: Benicar HCT    Xarelto    Verapamil    Atorvastatin    Bystolic    Spironlactone    Pantoprazole Sodium    Magnesium    Unknow ( Patient to bring Medication list)  Allergies:  Since Last Visit: Medical Alert: No Change    Medications: No Change    Allergies:        No Change  Pain Scale Type: Numeric Pain ScalePain Level: 0  Description: 68 yrs old pt presented for perio-charting  Noticed heavy plaque  and calculus build up on the upper right  Pt told me that she uses  proxy-brushes and listerine for a mouthwash  Completed perio-charting  Noticed deep pockets on the upper right  Recommended sc/rp for the upper  right only  Explained to the pt the fee for the procedure  Pt understood  prescribed Peridex and sent it to her pharmacy  n v sc/rp upper right    a melissa/dr sands    ----- Signed on Monday, March 06, 2017 at 1:58:37 PM  -----  ----- Provider: [email or web address] - Resident Three, Dentist -- Clinic: [facility]  -----

## 2018-01-14 VITALS
TEMPERATURE: 97.7 F | BODY MASS INDEX: 25.07 KG/M2 | HEART RATE: 88 BPM | HEIGHT: 62 IN | DIASTOLIC BLOOD PRESSURE: 62 MMHG | WEIGHT: 136.24 LBS | SYSTOLIC BLOOD PRESSURE: 110 MMHG

## 2018-01-14 NOTE — PROGRESS NOTES
Chief Complaint  PT  CAME INTO THE OFFICE TODAY FOR A NURSE VISIT FOR A B12 INJECTION ORDERED BY DR Camryn Milian ON 8/17/16  PT  BROUGHT HER OWN SERUM      Active Problems    1  Allergic rhinitis due to pollen (477 0) (J30 1)   2  Breast pain, right (611 71) (N64 4)   3  Chronic atrial fibrillation (427 31) (I48 2)   4  Chronic kidney disease, stage 3 (585 3) (N18 3)   5  Cough (786 2) (R05)   6  Cystocele, midline (618 01) (N81 11)   7  Encounter for Holly catheter removal (V53 6) (Z46 6)   8  Encounter for screening mammogram for breast cancer (V76 12) (Z12 31)   9  GERD without esophagitis (530 81) (K21 9)   10  History of atrial fibrillation (V12 59) (Z86 79)   11  Hyperlipidemia (272 4) (E78 5)   12  Hypertension (401 9) (I10)   13  Lateral epicondylitis, right elbow (726 32) (M77 11)   14  Left atrial enlargement (429 3) (I51 7)   15  Left renal artery stenosis (440 1) (I70 1)   16  Low back pain (724 2) (M54 5)   17  Lump or mass in breast (611 72) (N63)   18  Need for prophylactic vaccination and inoculation against influenza (V04 81) (Z23)   19  Need for vaccination with 13-polyvalent pneumococcal conjugate vaccine (V03 82) (Z23)   20  Onychomycosis of toenail (110 1) (B35 1)   21  Osteoarthritis of hip (715 95) (M16 9)   22  Osteoarthrosis, 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)   23  Osteopenia (733 90) (M85 80)   24  Pain due to onychomycosis of toenails of both feet (110 1,729 5)    (B35 1,M79 675,M79 674)   25  Pernicious anemia (281 0) (D51 0)   26  Right knee pain (719 46) (M25 561)   27  Seasonal affective disorder (296 99) (F39)   28  Shortness of breath (786 05) (R06 02)   29  Skin pustule (686 9) (L08 9)   30  Tubulovillous adenoma (229 9) (D36 9)   31  Vaginal wall prolapse (618 00) (N81 10)   32  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)   33  Vitamin B12 deficiency (266 2) (E53 8)    Current Meds   1  Benicar 40 MG Oral Tablet; TAKE 1 TABLET DAILY;    Therapy: 01Aru9149 to (Last Rx:18Cuh9457)  Requested for: 62FIU9158 Ordered   2  Benzonatate 100 MG Oral Capsule; TAKE 1 CAPSULE 3 TIMES DAILY AS NEEDED; Therapy: 58LXJ9004 to (Evaluate:46Kly0346)  Requested for: 86HRN0377; Last   Rx:41Xqf4993 Ordered   3  Bystolic 5 MG Oral Tablet; Therapy: (Recorded:41Lgr9288) to Recorded   4  Calcium 500 +D 500-400 MG-UNIT Oral Tablet; Therapy: (CRBUWKDH:21WYF0514) to Recorded   5  Ciclopirox 8 % External Solution; APPLY AND GENTLY MASSAGE INTO AFFECTED   AREA(S) TWICE DAILY; Therapy: 87EUN4673 to (Last Rx:42Pfu0273) Ordered   6  Cyanocobalamin 1000 MCG/ML Injection Solution; INJECT 1 ML INTRAMUSCULARLY   ONCE A MONTH  Requested for: 17Aug2016; Last Rx:17Aug2016 Ordered   7  Fluticasone Propionate 50 MCG/ACT Nasal Suspension; instill 2 sprays into each nostril   once daily; Therapy: 58YQG7157 to (Sharla Bess)  Requested for: 34KPC4548; Last   Rx:95Uee9222 Ordered   8  Indapamide 2 5 MG Oral Tablet; TAKE 1 TABLET DAILY; Therapy: 90YRN8973 to (Evaluate:86Cab9413); Last Rx:17Aug2016 Ordered   9  Magnesium Oxide 400 MG Oral Capsule; Take 2 daily; Therapy: 70IYX1713 to (Last Rx:18Feb2016)  Requested for: 81DHD1557 Ordered   10  Magnesium Oxide 400 MG Oral Capsule; TAKE AS DIRECTED; Therapy: 40SNT9816 to (Last Rx:32Pcv3879) Ordered   11  Pantoprazole Sodium 40 MG Oral Tablet Delayed Release; TAKE 1 TABLET BY MOUTH    ONCE DAILY; Therapy: 53UKG2985 to ([phone])  Requested for: 25HBH7334; Last    Rx:14Rff1994 Ordered   12  Premarin 0 625 MG/GM Vaginal Cream; Aplique un poco de crema en el dedo y eche en    la vagina todas las noches por 2 semanas y despues wilver noche si y Marilyn Rm no  No use    aplicador; Therapy: 28XFD2067 to (Last Rx:04Snr7027) Ordered   13  Spironolactone 25 MG Oral Tablet; Therapy: 33Prq5063 to (Last Rosalba Redder)  Requested for: 84Qiv6451 Ordered   14  Verapamil HCl - 120 MG Oral Tablet; 1 tab po qod; Therapy: 41QXH7887 to (Last Rx:18Feb2016) Ordered   15  Xarelto 15 MG Oral Tablet;  Take 1 tablet daily; Therapy: 85UET0569 to (Evaluate:10Nov2017)  Requested for: 79AHE2310; Last    Rx:42Guf4793 Ordered   16  ZyrTEC Allergy 10 MG Oral Capsule; take 1 capsule daily; Therapy: 55TJQ1392 to (Evaluate:28Oct2016)  Requested for: 95BUM6657; Last    Rx:81Qdf6372 Ordered    Allergies    1  No Known Drug Allergies    2  Adhesive Tape   3   No Known Environmental Allergies    Plan  Vitamin B12 deficiency    · Cyanocobalamin 1000 MCG/ML Injection Solution    Future Appointments    Date/Time Provider Specialty Site   04/13/2017 02:00 PM [place], PCP Nurse Schedule  [address] PCP   05/17/2017 09:05 AM Ruslan Trevino DO Internal Medicine [address] PCP   05/25/2017 01:00 PM [facility], Urogyn Room  [facility]     Signatures   Electronically signed by : Meg Moreno, ; Mar 13 2017  9:40AM EST                       (Author)    Electronically signed by : Arturo Duong DO; Mar 13 2017  3:58PM EST                       (Author)

## 2018-01-14 NOTE — PROGRESS NOTES
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)   4  Chronic atrial fibrillation (427 31) (I48 2)   5  Chronic kidney disease, stage 3 (585 3) (N18 3)   6  Cough (786 2) (R05)   7  Cystocele, midline (618 01) (N81 11)   8  Encounter for Holly catheter removal (V53 6) (Z46 6)   9  Encounter for screening mammogram for breast cancer (V76 12) (Z12 31)   10  Fatigue (780 79) (R53 83)   11  GERD without esophagitis (530 81) (K21 9)   12  Hallux abducto valgus, bilateral (735 0) (M20 11,M20 12)   13  History of atrial fibrillation (V12 59) (Z86 79)   14  Hyperlipidemia (272 4) (E78 5)   15  Hypertension (401 9) (I10)   16  Lateral epicondylitis, right elbow (726 32) (M77 11)   17  Left atrial enlargement (429 3) (I51 7)   18  Left renal artery stenosis (440 1) (I70 1)   19  Lipoma of right upper extremity (214 8) (D17 21)   20  Low back pain (724 2) (M54 5)   21  Lump or mass in breast (611 72) (N63)   22  Need for prophylactic vaccination and inoculation against influenza (V04 81) (Z23)   23  Need for vaccination with 13-polyvalent pneumococcal conjugate vaccine (V03 82) (Z23)   24  Onychomycosis of toenail (110 1) (B35 1)   25  Osteoarthritis of hip (715 95) (M16 9)   26  Osteoarthrosis, 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)   27  Osteopenia (733 90) (M85 80)   28  Pain due to onychomycosis of toenails of both feet (110 1,729 5)    (B35 1,M79 675,M79 674)   29  Pernicious anemia (281 0) (D51 0)   30  Right knee pain (719 46) (M25 561)   31  Seasonal affective disorder (296 99) (F33 9)   32  Shortness of breath (786 05) (R06 02)   33  Skin pustule (686 9) (L08 9)   34   Tubulovillous adenoma (229  9) (D36 9)   35  Vaginal wall prolapse (618 00) (N81 10)   36  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)   37  Vitamin B12 deficiency (266 2) (E53 8)    Current Meds   1  Benicar 40 MG Oral Tablet; TAKE 1 TABLET DAILY; Therapy: 39Ffw5829 to (Last Rx:42Ioo2220)  Requested for: 09ZZL5519 Ordered   2  Benzonatate 100 MG Oral Capsule; TAKE 1 CAPSULE 3 TIMES DAILY AS NEEDED; Therapy: 65POG5460 to (Evaluate:64Qqd3980)  Requested for: 75CMF1952; Last   Rx:88Qqx4466 Ordered   3  Bystolic 5 MG Oral Tablet; Therapy: (Recorded:04Oep3527) to Recorded   4  Calcium 500 +D 500-400 MG-UNIT Oral Tablet; Therapy: (NCIBGAV46NAL0497) to Recorded   5  Ciclopirox 8 % External Solution; APPLY AND GENTLY MASSAGE INTO AFFECTED   AREA(S) TWICE DAILY; Therapy: 59TCQ5773 to (Last Rx:17Qxy0559) Ordered   6  Cyanocobalamin 1000 MCG/ML Injection Solution; INJECT 1 ML INTRAMUSCULARLY   ONCE A MONTH  Requested for: 2016; Last Rx:2016 Ordered   7  Fluticasone Propionate 50 MCG/ACT Nasal Suspension; instill 2 sprays into each nostril   once daily; Therapy: 84LJS9146 to (Evaluate:33Cow1956)  Requested for: 43CVX5533; Last   Rx:85Inl9159 Ordered   8  Indapamide 2 5 MG Oral Tablet; TAKE 1 TABLET DAILY; Therapy: 30OMF3601 to (Evaluate:47Hjy7431); Last Rx:2016 Ordered   9  Magnesium Oxide 400 MG Oral Capsule; Take 2 daily; Therapy: 76KIU2067 to (Last Rx:14Nlb6075)  Requested for: 24EJI4619 Ordered   10  Magnesium Oxide 400 MG Oral Capsule; TAKE AS DIRECTED; Therapy: 58DNY5163 to (Last Rx:28Qbx7989) Ordered   11  Pantoprazole Sodium 40 MG Oral Tablet Delayed Release; TAKE 1 TABLET BY MOUTH    ONCE DAILY; Therapy: 72PDM5456 to (Evaluate:28Cln8805)  Requested for: 27XGD3121; Last    Rx:2017 Ordered   12  Premarin 0 625 MG/GM Vaginal Cream; Aplique un poco de crema en el dedo y eche en    la vagina todas las noches por 2 semanas y despues wilver noche si y [place] no  No use    aplicador;     Therapy: 37Qxa2808 to (Last Rx: 61YJO7407) Ordered   13  Spironolactone 25 MG Oral Tablet; Therapy: 03Qkv0881 to (Last Elizabeth Kyle)  Requested for: 93Jzf6717 Ordered   14  Verapamil HCl - 120 MG Oral Tablet; 1 tab po qod; Therapy: 68IUX7119 to (Last Rx:82Wwm7708) Ordered   15  Xarelto 15 MG Oral Tablet; Take 1 tablet daily; Therapy: 21EPD0803 to (Evaluate:10Nov2017)  Requested for: 38ZRR6220; Last    Rx:29Zdq1024 Ordered   16  ZyrTEC Allergy 10 MG Oral Capsule; take 1 capsule daily; Therapy: 12SKW8918 to (Evaluate:14Oct2017)  Requested for: 28APL8492; Last    Rx:06Vuy5849 Ordered    Allergies    1  No Known Drug Allergies    2  Adhesive Tape   3   No Known Environmental Allergies    Plan  Vitamin B12 deficiency    · Cyanocobalamin 1000 MCG/ML Injection Solution    Future Appointments    Date/Time Provider Specialty Site   09/21/2017 02:00 PM [place], [facility] Nurse Schedule  ST [address] [place] [address] PCP   09/19/2017 11:15 AM Indira Huggins DO Internal Medicine [address] PCP     Signatures   Electronically signed by : Simona Vail, ; Aug 21 2017 10:20AM EST                       (Author)    Electronically signed by : Moise Koroma DO; Aug 23 2017  4:21PM EST                       (Author)    Electronically signed by : Madelaine Boeck, DO; Aug 23 2017  8:18PM EST                       (Review)

## 2018-01-14 NOTE — CONSULTS
Assessment    1  Vaginal wall prolapse (618 00) (N81 10)    Discussion/Summary  Discussion Summary:     74yo w/ pelvic organ prolapse scheduled for surgery on 3/17/16  1) Plan for Anterior VEC, anterior/posterior repair, PB sling, cystoscopy  2) Consent obtained: pt counseled of risks/benefits/alternatives  Surgery discussed with Dr Inés Eller  3) Pt to f/u with Cardio for stress test  Medical clearance obtained by PCP  4) Pt to d/c Xarelto within 2 days before surgery  History of Present Illness  HPI:   77yo  here for pre-op H&P  Surgery: Scheduled for Anterior VEC, anterior/posterior colporraphy, PB sling (single incision), cystocopy  Medical clearance status: Pt is medically cleared by PCP  Stress test is still pending with cardiologist next week  Past management: Use of pessary for POP and no medical tx  Currently not using pessary  No complaints of MARY, dysuria, urgency, frequency, CP/SOB/F/Ch/N/V/C/D  POP-Q Exam:  Aa +1 Ba +2 C -4  Ap -1 Bp -1 TVL 8  GH 4 PB 2 5 D 6      Review of Systems   Female ROS: no pelvic pain, no pelvic pressure, no nonmenstrual bleeding, no postmenopausal bleeding, no dysuria, no change in urinary frequency and no feelings of urinary urgency  Focused-Female:   Constitutional: No fever, no chills, feels well, no tiredness, no recent weight gain or loss  Cardiovascular: no complaints of slow or fast heart rate, no chest pain, no palpitations, no leg claudication or lower extremity edema  Respiratory: no complaints of shortness of breath, no wheezing, no dyspnea on exertion, no orthopnea or PND  Gastrointestinal: no complaints of abdominal pain, no constipation, no nausea or diarrhea, no vomiting, no bloody stools  Genitourinary: no complaints of dysuria, no incontinence, no pelvic pain, no dysmenorrhea, no vaginal discharge or abnormal vaginal bleeding     Musculoskeletal: no complaints of arthralgia, no myalgia, no joint swelling or stiffness, no limb pain or swelling  Neurological: no complaints of headache, no confusion, no numbness or tingling, no dizziness or fainting  ROS Reviewed:   ROS reviewed  OB History  Pregnancy History (Brief):   Prior pregnancies: : 2  Para: 2 (living)   Delivery type: 2 vaginal       Active Problems     1  Chronic atrial fibrillation (427 31) (I48 2)   2  Chronic kidney disease, stage 3 (585 3) (N18 3)   3  Cough (786 2) (R05)   4  Cystocele, midline (618 01) (N81 11)   5  History of atrial fibrillation (V12 59) (Z86 79)   6  Hyperlipidemia (272 4) (E78 5)   7  Lateral epicondylitis, right elbow (726 32) (M77 11)   8  Left atrial enlargement (429 3) (I51 7)   9  Left renal artery stenosis (440 1) (I70 1)   10  Low back pain (724 2) (M54 5)   11  Lump or mass in breast (611 72) (N63)   12  Osteoarthritis of hip (715 95) (M16 9)   13  Osteoarthrosis, 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)   14  Osteopenia (733 90) (M85 80)   15  Pernicious anemia (281 0) (D51 0)   16  Preop examination (V72 84) (Z01 818)   17  Right knee pain (719 46) (M25 561)   18  Tubulovillous adenoma (229 9) (D36 9)   19  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)   20  Visit for screening mammogram (V76 12) (Z12 31)   21  Vitamin B12 deficiency (266 2) (E53 8)   22  Weight loss (783 21) (R63 4)    Hypertension (401 9) (I10)       GERD without esophagitis (530 81) (K21 9)       Vaginal wall prolapse (618 00) (N81 10)       Allergic rhinitis due to pollen (477 0) (J30 1)          Past Medical History    1  History of Encounter for screening colonoscopy (V76 51) (Z12 11)   2  History of atrial fibrillation (V12 59) (Z86 79)    Surgical History    1  History of Biopsy Breast Percutaneous Needle Core  Surgical History Reviewed: The surgical history was reviewed and updated today  Family History    1  Family history of diabetes mellitus (V18 0) (Z83 3)  Family History Reviewed: The family history was reviewed and updated today         Social History    · Denied: History of Alcohol use   · Denied: History of Drug use   · Housing, household, and economic circumstances (V60 9) (Z59 9)   · Never A Smoker    Current Meds   1  Benicar 40 MG Oral Tablet; TAKE 1 TABLET DAILY; Therapy: 69Ugz4495 to (Last Rx:66Ywv2912)  Requested for: 42DPG3942 Ordered   2  Bystolic 5 MG Oral Tablet; Therapy: (Recorded:21Avk7534) to Recorded   3  Calcium 500 +D 500-400 MG-UNIT Oral Tablet; Therapy: (EIXJFKKR:27NWJ3535) to Recorded   4  Cyanocobalamin 1000 MCG/ML Injection Solution; INJECT 1 ML INTRAMUSCULARLY   ONCE A MONTH; Last Rx:94Fkr0778 Ordered   5  Indapamide 2 5 MG Oral Tablet; TAKE 1 TABLET DAILY; Therapy: 54MLT3297 to (Evaluate:26Jun2016); Last Rx:02Xju3007 Ordered   6  Magnesium Oxide 400 MG Oral Capsule; Take 2 daily; Therapy: 33LEV7436 to (Last Rx:18Feb2016)  Requested for: 41YUJ6023 Ordered   7  Magnesium Oxide 400 MG Oral Capsule; TAKE AS DIRECTED; Therapy: 05RSP4084 to (Last Rx:77Xqt4283) Ordered   8  Pantoprazole Sodium 40 MG Oral Tablet Delayed Release; TAKE 1 TABLET BY MOUTH   ONCE DAILY; Therapy: 11QAF2416 to (Evaluate:22Wbz5793)  Requested for: 21Gch4758; Last   Rx:12Rey2478 Ordered   9  Premarin 0 625 MG/GM Vaginal Cream; Aplique un poco de crema en el dedo y eche en   la vagina todas las noches por 2 semanas y despues wilver noche si y Sergio Kothari no  No use   aplicador; Therapy: 45ONS8066 to (Last Rx:64Vro4446) Ordered   10  Spironolactone 25 MG Oral Tablet; Therapy: 46Xkk2475 to (Last Macias Saldaña)  Requested for: 10Ksh0868 Ordered   11  Verapamil HCl - 120 MG Oral Tablet; 1 tab po qod; Therapy: 84AYO5857 to (Last Rx:06Dke9702) Ordered   12  Xarelto 15 MG Oral Tablet; Take 1 tablet daily; Therapy: 35LPG8052 to (Evaluate:47Vth8813)  Requested for: 48JML0447; Last    Rx:86Ccu4026 Ordered   13  ZyrTEC Allergy 10 MG Oral Capsule; take 1 capsule daily; Therapy: 66ABC7379 to (Evaluate:10Mar2016); Last Rx:12Oct2015 Ordered    Allergies    1   No Known Drug Allergies    2  Adhesive Tape   3  No Known Environmental Allergies    Vitals  Vital Signs    Recorded: 27DRN4915 78:19HN   Systolic 182   Diastolic 80   Heart Rate 88   Respiration 16   Height 5 ft 2 in   Weight 139 lb 4 oz   BMI Calculated 25 47   BSA Calculated 1 64     Physical Exam    Constitutional   General appearance: No acute distress, well appearing and well nourished  Pulmonary   Respiratory effort: No increased work of breathing or signs of respiratory distress  Auscultation of lungs: Clear to auscultation  Cardiovascular   Auscultation of heart: Normal rate and rhythm, normal S1 and S2, no murmurs  Genitourinary   External genitalia: Normal and no lesions appreciated  Vagina: Abnormal   POP-Q exam completed  Urethra: Normal     Cervix: Normal, no palpable masses  Uterus: Normal, non-tender, not enlarged, and no palpable masses  Adnexa/parametria: Normal, non-tender and no fullness or masses appreciated  Abdomen   Abdomen: Normal, non-tender, and no organomegaly noted  Skin   Skin and subcutaneous tissue: Abnormal   Varicose veins in bilateral exremities  Psychiatric   Orientation to person, place, and time: Normal     Mood and affect: Normal        Future Appointments    Date/Time Provider Specialty Site   09/06/2016 09:30 AM [place], PCP Nurse Schedule  [address] PCP   12/30/2016 01:20 PM [facility], Podiatry  [place] [address]   11/15/2016 09:35 AM Americo Solano, DO Internal Medicine [address] PCP   05/25/2017 01:00 PM [facility], [address]     Signatures   Electronically signed by : KARLI Mckeon ; Feb 25 2016  2:13PM EST                       (Author)    Electronically signed by :  Orlinda Curling, M D ; Aug 22 2016  9:54AM EST                       (Author)    Electronically signed by : Nori Vasquez MD; Sep  8 2016  9:45AM EST

## 2018-01-14 NOTE — PROGRESS NOTES
Chief Complaint  Patient here today for Nurse Visit for Vitamain B12 injection, as order by Dr Tone Steel on 10/12/15  Injection given  Patient to schedule appt in one month for next injection  Active Problems    1  Allergic rhinitis due to pollen (477 0) (J30 1)   2  Chest pain on breathing (786 52) (R07 1)   3  Chronic atrial fibrillation (427 31) (I48 2)   4  Chronic kidney disease, stage 3 (585 3) (N18 3)   5  Cough (786 2) (R05)   6  Cystocele, midline (618 01) (N81 11)   7  Encounter for Holly catheter removal (V53 6) (Z46 6)   8  GERD without esophagitis (530 81) (K21 9)   9  History of atrial fibrillation (V12 59) (Z86 79)   10  Hyperlipidemia (272 4) (E78 5)   11  Hypertension (401 9) (I10)   12  Lateral epicondylitis, right elbow (726 32) (M77 11)   13  Left atrial enlargement (429 3) (I51 7)   14  Left renal artery stenosis (440 1) (I70 1)   15  Low back pain (724 2) (M54 5)   16  Lump or mass in breast (611 72) (N63)   17  Need for vaccination with 13-polyvalent pneumococcal conjugate vaccine (V03 82) (Z23)   18  Onychomycosis of toenail (110 1) (B35 1)   19  Osteoarthritis of hip (715 95) (M16 9)   20  Osteoarthrosis, 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)   21  Osteopenia (733 90) (M85 80)   22  Pernicious anemia (281 0) (D51 0)   23  Preop examination (V72 84) (Z01 818)   24  Right knee pain (719 46) (M25 561)   25  Seasonal affective disorder (296 99) (F39)   26  Shortness of breath (786 05) (R06 02)   27  Skin pustule (686 9) (L08 9)   28  Tubulovillous adenoma (229 9) (D36 9)   29  Vaginal wall prolapse (618 00) (N81 10)   30  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)   31  Visit for screening mammogram (V76 12) (Z12 31)   32  Vitamin B12 deficiency (266 2) (E53 8)   33  Weight loss (783 21) (R63 4)    Current Meds   1  Benicar 40 MG Oral Tablet; TAKE 1 TABLET DAILY; Therapy: 17Fit5798 to (Last Rx:15Nov2013)  Requested for: 83RMB5638 Ordered   2  Bystolic 5 MG Oral Tablet;    Therapy: (Recorded:18Feb2016) to Recorded   3  Calcium 500 +D 500-400 MG-UNIT Oral Tablet; Therapy: (APQEEWIY:48TBD6544) to Recorded   4  Ciclopirox 8 % External Solution; APPLY AND GENTLY MASSAGE INTO AFFECTED   AREA(S) TWICE DAILY; Therapy: 58MWW9504 to (Last Rx:51Pqv1817) Ordered   5  Cyanocobalamin 1000 MCG/ML Injection Solution; INJECT 1 ML INTRAMUSCULARLY   ONCE A MONTH; Last Rx:12Oct2015 Ordered   6  Indapamide 2 5 MG Oral Tablet; TAKE 1 TABLET DAILY; Therapy: 51UPH0499 to (Evaluate:26Jun2016); Last Rx:99Cck2122 Ordered   7  Magnesium Oxide 400 MG Oral Capsule; Take 2 daily; Therapy: 41AQZ7874 to (Last Rx:18Feb2016)  Requested for: 06DRT8232 Ordered   8  Magnesium Oxide 400 MG Oral Capsule; TAKE AS DIRECTED; Therapy: 91PMJ0551 to (Last Rx:79Nlb7896) Ordered   9  Pantoprazole Sodium 40 MG Oral Tablet Delayed Release; TAKE 1 TABLET BY MOUTH   ONCE DAILY; Therapy: 26WAN8066 to (Floydene Dancer)  Requested for: 75RAI7898; Last   Rx:03Hpt3075 Ordered   10  Premarin 0 625 MG/GM Vaginal Cream; Aplique un poco de crema en el dedo y eche en    la vagina todas las noches por 2 semanas y despues wilver noche si y [place] no  No use    aplicador; Therapy: 54HPN1936 to (Last Rx:81Ozr5543) Ordered   11  Spironolactone 25 MG Oral Tablet; Therapy: 79Qbl7947 to (Last [place] [employer])  Requested for: 42Eci3727 Ordered   12  Verapamil HCl - 120 MG Oral Tablet; 1 tab po qod; Therapy: 24WLX3369 to (Last Rx:18Feb2016) Ordered   13  Xarelto 15 MG Oral Tablet; Take 1 tablet daily; Therapy: 44UQH6742 to (Evaluate:85Ygp6276)  Requested for: 39VUB4961; Last    Rx:04Jan2016 Ordered   14  ZyrTEC Allergy 10 MG Oral Capsule; take 1 capsule daily; Therapy: 43HME7540 to (Evaluate:28Oct2016)  Requested for: 91KTI4641; Last    Rx:88Xiz0792 Ordered    Allergies    1  No Known Drug Allergies    2  Adhesive Tape   3   No Known Environmental Allergies    Plan  Vitamin B12 deficiency    · Cyanocobalamin 1000 MCG/ML Injection Solution    Future Appointments    Date/Time Provider Specialty Site   09/06/2016 09:30 AM Leanna, CATA Nurse Schedule   [address] PCP   12/30/2016 01:20 PM Specialty Clinic, Podiatry  [facility]   08/17/2016 08:45 AM Jess Ornelas DO Internal Medicine  [facility] PCP   05/25/2017 01:00 PM [facility], [address]     Signatures   Electronically signed by : Jose Elias Baron, ; Aug  8 2016 10:29AM EST                       (Author)    Electronically signed by : Josselyn Moreno DO; Aug  8 2016 10:36AM EST                       (Validation)

## 2018-01-15 ENCOUNTER — APPOINTMENT (OUTPATIENT)
Dept: LAB | Facility: HOSPITAL | Age: 78
End: 2018-01-15
Payer: COMMERCIAL

## 2018-01-15 DIAGNOSIS — R19.7 DIARRHEA: ICD-10-CM

## 2018-01-15 DIAGNOSIS — R19.7 DIARRHEA, UNSPECIFIED TYPE: ICD-10-CM

## 2018-01-15 LAB
CAMPYLOBACTER DNA SPEC NAA+PROBE: NORMAL
SALMONELLA DNA SPEC QL NAA+PROBE: NORMAL
SHIGA TOXIN STX GENE SPEC NAA+PROBE: NORMAL
SHIGELLA DNA SPEC QL NAA+PROBE: NORMAL

## 2018-01-15 PROCEDURE — 87177 OVA AND PARASITES SMEARS: CPT

## 2018-01-15 PROCEDURE — 87209 SMEAR COMPLEX STAIN: CPT

## 2018-01-15 PROCEDURE — 87505 NFCT AGENT DETECTION GI: CPT

## 2018-01-15 NOTE — PROGRESS NOTES
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)   4  Chronic atrial fibrillation (427 31) (I48 2)   5  Chronic kidney disease, stage 3 (585 3) (N18 3)   6  Cough (786 2) (R05)   7  Cystocele, midline (618 01) (N81 11)   8  Encounter for Holly catheter removal (V53 6) (Z46 6)   9  Encounter for screening mammogram for breast cancer (V76 12) (Z12 31)   10  Fatigue (780 79) (R53 83)   11  GERD without esophagitis (530 81) (K21 9)   12  Hallux abducto valgus, bilateral (735 0) (M20 11,M20 12)   13  History of atrial fibrillation (V12 59) (Z86 79)   14  Hyperlipidemia (272 4) (E78 5)   15  Hypertension (401 9) (I10)   16  Lateral epicondylitis, right elbow (726 32) (M77 11)   17  Left atrial enlargement (429 3) (I51 7)   18  Left renal artery stenosis (440 1) (I70 1)   19  Lipoma of right upper extremity (214 8) (D17 21)   20  Low back pain (724 2) (M54 5)   21  Lump or mass in breast (611 72) (N63)   22  Need for prophylactic vaccination and inoculation against influenza (V04 81) (Z23)   23  Need for vaccination with 13-polyvalent pneumococcal conjugate vaccine (V03 82) (Z23)   24  Onychomycosis of toenail (110 1) (B35 1)   25  Osteoarthritis of hip (715 95) (M16 9)   26  Osteoarthrosis, 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)   27  Osteopenia (733 90) (M85 80)   28  Pain due to onychomycosis of toenails of both feet (110 1,729 5)    (B35 1,M79 675,M79 674)   29  Pernicious anemia (281 0) (D51 0)   30  Right knee pain (719 46) (M25 561)   31  Seasonal affective disorder (296 99) (F33 9)   32  Shortness of breath (786 05) (R06 02)   33  Skin pustule (686 9) (L08 9)   34   Tubulovillous adenoma (229 9) (D36 9)   35  Vaginal wall prolapse (618 00) (N81 10)   36  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)   37  Vitamin B12 deficiency (266 2) (E53 8)    Current Meds   1  Benicar 40 MG Oral Tablet; TAKE 1 TABLET DAILY; Therapy: 50Zso3090 to (Last Rx:49Erb7342)  Requested for: 85RML3158 Ordered   2  Benzonatate 100 MG Oral Capsule; TAKE 1 CAPSULE 3 TIMES DAILY AS NEEDED; Therapy: 11LZP7129 to (Evaluate:81Vcf5622)  Requested for: 27SOU8075; Last   Rx:80Cqj8208 Ordered   3  Bystolic 5 MG Oral Tablet; Therapy: (Recorded:76Wmg1969) to Recorded   4  Calcium 500 +D 500-400 MG-UNIT Oral Tablet; Therapy: (ZIPTIIUM:83DXH8317) to Recorded   5  Ciclopirox 8 % External Solution; APPLY AND GENTLY MASSAGE INTO AFFECTED   AREA(S) TWICE DAILY; Therapy: 27MRZ8138 to (Last Rx:37Wbq0114) Ordered   6  Cyanocobalamin 1000 MCG/ML Injection Solution; INJECT 1 ML INTRAMUSCULARLY   ONCE A MONTH  Requested for: 17Aug2016; Last Rx:17Aug2016 Ordered   7  Fluticasone Propionate 50 MCG/ACT Nasal Suspension; instill 2 sprays into each nostril   once daily; Therapy: 09CTS0295 to (Evaluate:18Eni4796)  Requested for: 92WBC6986; Last   Rx:73Glg2090 Ordered   8  Indapamide 2 5 MG Oral Tablet; TAKE 1 TABLET DAILY; Therapy: 35MPJ9246 to (Evaluate:48Asf1892); Last Rx:17Aug2016 Ordered   9  Magnesium Oxide 400 MG Oral Capsule; Take 2 daily; Therapy: 86OJR5525 to (Last Rx:21Zdc5151)  Requested for: 49WBK7454 Ordered   10  Magnesium Oxide 400 MG Oral Capsule; TAKE AS DIRECTED; Therapy: 02ITH1658 to (Last Rx:49Eit6283) Ordered   11  Pantoprazole Sodium 40 MG Oral Tablet Delayed Release; TAKE 1 TABLET BY MOUTH    ONCE DAILY; Therapy: 62BTS5186 to (Evaluate:29Hga2669)  Requested for: 90AGX3781; Last    Rx:17May2017 Ordered   12  Premarin 0 625 MG/GM Vaginal Cream; Aplique un poco de crema en el dedo y eche en    la vagina todas las noches por 2 semanas y despues wilver noche si y [place] no  No use    aplicador;     Therapy: 02Tqq2852 to (Last Rx: 68FSI6309) Ordered   13  Spironolactone 25 MG Oral Tablet; Therapy: 91Cds5204 to (Last Vernelle Kras)  Requested for: 52Lze4367 Ordered   14  Verapamil HCl - 120 MG Oral Tablet; 1 tab po qod; Therapy: 92BYT6107 to (Last Rx:63Itw4462) Ordered   15  Xarelto 15 MG Oral Tablet; Take 1 tablet daily; Therapy: 45YJR0183 to (Evaluate:10Nov2017)  Requested for: 39KKP3474; Last    Rx:51Pqn5252 Ordered   16  ZyrTEC Allergy 10 MG Oral Capsule; take 1 capsule daily; Therapy: 66SVS7252 to (Evaluate:14Oct2017)  Requested for: 90SCE3314; Last    Rx:07Ngt8493 Ordered    Allergies    1  No Known Drug Allergies    2  Adhesive Tape   3   No Known Environmental Allergies    Plan  Vitamin B12 deficiency    · Cyanocobalamin 1000 MCG/ML Injection Solution    Future Appointments    Date/Time Provider Specialty Site   08/21/2017 10:00 AM Leanna, CATA Nurse Schedule  [address] PCP   09/19/2017 11:15 AM Cheli Chance DO Internal Medicine [address] PCP     Signatures   Electronically signed by : Steve Hernandez, ; Jul 18 2017  9:54AM EST                       (Author)    Electronically signed by : Rosemary Tarango DO; Jul 18 2017 11:20AM EST                       (Author)    Electronically signed by : KARLI Zamora ; Jul 18 2017 11:49AM EST                       (Co-author)

## 2018-01-15 NOTE — PROGRESS NOTES
Assessment    1  Vitamin B12 deficiency (266 2) (E53 8)   2  Medicare annual wellness visit, initial (V70 0) (Z00 00)    Plan  Chronic kidney disease, stage 3    · Indapamide 2 5 MG Oral Tablet; TAKE 1 TABLET DAILY  Fatigue, Hyperlipidemia, Vitamin B12 deficiency    · 1 SL NUTRITION COUNSELING [place] [facility] RE[facility]  Co-Management  Pt with high cholesterol, B12 deficiency, and fatigue  requesting nutrition consult for proper diet, micronutrient intake  Status: Hold For  - Scheduling  Requested for: 96XAV0225  Care Summary provided  : Yes  Health Maintenance    · Follow-up visit in 6 months Evaluation and Treatment  Follow up in six months with Dr Yoselin Torres in next ambulatory block for routine check up  Status: Hold For - Scheduling   Requested for: 44DSS3517   · Follow-up visit in 6 months Evaluation and Treatment  Follow-up with Dr Yoselin Torres for  annual exam   Status: Canceled - Scheduling  Pernicious anemia    · Cyanocobalamin 1000 MCG/ML Injection Solution; INJECT 1 ML  INTRAMUSCULARLY ONCE A MONTH  PMH: Breast pain, right    · Follow-up visit in 6 months Evaluation and Treatment  Follow-up in 6 months  Status:  Canceled - Scheduling  PMH: Viral bronchitis    · Benzonatate 100 MG Oral Capsule; TAKE 1 CAPSULE 3 TIMES DAILY AS  NEEDED   · Fluticasone Propionate 50 MCG/ACT Nasal Suspension; instill 2 sprays into  each nostril once daily    Discussion/Summary  Impression: Initial Annual Wellness Visit, with preventive exam as well as age and risk appropriate counseling completed  Cardiovascular screening and counseling: the risks and benefits of screening were discussed, counseling was given on maintaining a healthy diet and counseling was given on maintaining a healthy weight  Colorectal cancer screening and counseling: screening is current  Breast cancer screening and counseling: screening is current  Up to date   Immunizations: influenza vaccination is recommended annually, the lifetime pneumococcal vaccine has been completed, hepatitis B vaccination series is not indicated at this time due to the patient's low risk of milana the disease, Zostavax vaccination status is unknown, Td vaccination up to date and Tdap vaccination up to date  Advance Directive Planning: not complete, paperwork and instructions were given to the patient, she was encouraged to follow-up with me to discuss her questions and/or decisions  Advice and education were given regarding nutrition (non-diabetic)  Patient Discussion: plan discussed with the patient, follow-up visit needed in one year  History of Present Illness  The patient is being seen for the initial annual wellness visit  Medicare Screening and Risk Factors   Hospitalizations: no previous hospitalizations  Medicare Screening Tests Risk Questions   Osteoporosis risk assessment: female gender and over 48years of age  Drug and Alcohol Use: The patient has never smoked cigarettes and has never used smokeless tobacco  The patient reports never drinking alcohol  She has never used illicit drugs  Diet and Physical Activity: Current diet includes well balanced meals, 3 servings per day servings of fruit per day, 2 servings per day servings of vegetables per day, 1 serving per day cups of coffee per day and 8+ servings per day  She exercises infrequently and patient walks a lot instead of taking the bus  Exercise: walking  Mood Disorder and Cognitive Impairment Screening: PHQ-9 Depression Scale   Over the past 2 weeks, how often have you been bothered by the following problems? 1 ) Little interest or pleasure in doing things? Several days  2 ) Feeling down, depressed or hopeless? Not at all    3 ) Trouble falling asleep or sleeping too much? Not at all    4 ) Feeling tired or having little energy? Nearly every day  5 ) Poor appetite or overeating?  Not at all    6 ) Feeling bad about yourself, or that you are a failure, or have let yourself or your family down? Not at all    7 ) Trouble concentrating on things, such as reading a newspaper or watching television? Not at all    8 ) Moving or speaking so slowly that other people could have noticed, or the opposite, moving or speaking faster than usual? Not at all  TOTAL SCORE: 4, severity of depression is mild  How difficult have these problems made it for you to do your work, take care of things at home, or get along with people? Somewhat difficult  Anxiety screening was done using GAD7 and score was 0  Depression screening was done using PHQ9  She denies feeling down, depressed, or hopeless over the past two weeks  She reports feeling little interest or pleasure in doing things over the past two weeks  Cognitive impairment screening: denies difficulty learning/retaining new information, denies difficulty handling complex tasks, denies difficulty with reasoning, denies difficulty with spatial ability and orientation, denies difficulty with language and denies difficulty with behavior  Functional Ability/Level of Safety: Hearing is normal bilaterally  She does not use a hearing aid  Activities of daily living details: does not need help using the phone, no transportation help needed, does not need help shopping, no meal preparation help needed, does not need help doing housework, does not need help doing laundry, does not need help managing medications and does not need help managing money  Fall risk factors: The patient fell 0 times in the past 12 months  Injury History: no alcohol use, no mobility impairment, no deconditioning, visual impairment, no urinary incontinence, antihypertensive use, no cognitive impairment, up and go test was normal and no previous fall  Home safety risk factors:  no unfamiliar surroundings, no loose rugs, no poor household lighting, no uneven floors, no household clutter, grab bars in the bathroom and handrails on the stairs     Advance Directives: Advance directives: POLST and [place] information given to patient, but no living will, no durable power of  for health care directives and no advance directives  Co-Managers and Medical Equipment/Suppliers: See Patient Care Team   The patient is currently asymptomatic Symptoms Include:        Patient Care Team    Care Team Member Role Specialty Office Number   Specialty Clinic, Podiatry Specialist  [phone], 520 [address]  Internal Medicine [phone], Team   [phone]   [place]   [phone]           Active Problems    1  Allergic rhinitis due to pollen (477 0) (J30 1)   2  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)   3  Chronic atrial fibrillation (427 31) (I48 2)   4  Chronic kidney disease, stage 3 (585 3) (N18 3)   5  Cystocele, midline (618 01) (N81 11)   6  Encounter for Holly catheter removal (V53 6) (Z46 6)   7  Encounter for screening mammogram for breast cancer (V76 12) (Z12 31)   8  Fatigue (780 79) (R53 83)   9  GERD without esophagitis (530 81) (K21 9)   10  Hallux abducto valgus, bilateral (735 0) (M20 11,M20 12)   11  History of atrial fibrillation (V12 59) (Z86 79)   12  Hyperlipidemia (272 4) (E78 5)   13  Hypertension (401 9) (I10)   14  Lateral epicondylitis, right elbow (726 32) (M77 11)   15  Left atrial enlargement (429 3) (I51 7)   16  Left renal artery stenosis (440 1) (I70 1)   17  Lipoma of right upper extremity (214 8) (D17 21)   18  Low back pain (724 2) (M54 5)   19  Lump or mass in breast (611 72) (N63)   20  Need for prophylactic vaccination and inoculation against influenza (V04 81) (Z23)   21  Need for vaccination with 13-polyvalent pneumococcal conjugate vaccine (V03 82) (Z23)   22  Onychomycosis of toenail (110 1) (B35 1)   23  Osteoarthritis of hip (715 95) (M16 9)   24  Osteoarthrosis, 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)   25  Osteopenia (733 90) (M85 80)   26  Pain due to onychomycosis of toenails of both feet (110 1,098  5) (B35 1,M79 675,M79 674)   27  Pernicious anemia (281 0) (D51 0)   28  Right knee pain (719 46) (M25 561)   29  Seasonal affective disorder (296 99) (F33 9)   30  Shortness of breath (786 05) (R06 02)   31  Skin pustule (686 9) (L08 9)   32  Tubulovillous adenoma (229 9) (D36 9)   33  Vaginal wall prolapse (618 00) (N81 10)   34  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)   35  Vitamin B12 deficiency (266 2) (E53 8)    Past Medical History    1  History of Breast pain, right (611 71) (N64 4)   2  History of Chest pain on breathing (786 52) (R07 1)   3  History of Encounter for screening colonoscopy (V76 51) (Z12 11)   4  History of atrial fibrillation (V12 59) (Z86 79)   5  History of cough   6  History of Preop examination (V72 84) (Z01 818)   7  History of Viral bronchitis (466 0) (J20 8)   8  History of Weight loss (783 21) (R63 4)    Surgical History    1  History of Biopsy Breast Percutaneous Needle Core    Family History  Mother    1  Family history of diabetes mellitus (V18 0) (Z83 3)    Social History    · Denied: History of Alcohol use   · Denied: History of Drug use   · Housing, household, and economic circumstances (V60 9) (Z59 9)   · Never A Smoker    Current Meds   1  Benicar 40 MG Oral Tablet; TAKE 1 TABLET DAILY; Therapy: 59Sqo7835 to (Last Rx:75Lar9846)  Requested for: 25DXQ0301 Ordered   2  Benzonatate 100 MG Oral Capsule; TAKE 1 CAPSULE 3 TIMES DAILY AS NEEDED; Therapy: 63ATT5471 to (Evaluate:78Tlt7264)  Requested for: 81ZOI6216; Last   Rx:46Ktw5400 Ordered   3  Bystolic 5 MG Oral Tablet; Therapy: (Recorded:60Gbh1206) to Recorded   4  Calcium 500 +D 500-400 MG-UNIT Oral Tablet; Therapy: (ZXTVYCOD:00GSF5375) to Recorded   5  Ciclopirox 8 % External Solution; APPLY AND GENTLY MASSAGE INTO AFFECTED   AREA(S) TWICE DAILY; Therapy: 12JUI6846 to (Last Rx:85Nbr9112) Ordered   6   Cyanocobalamin 1000 MCG/ML Injection Solution; INJECT 1 ML INTRAMUSCULARLY   ONCE A MONTH  Requested for: 17Aug2016; Last Rx:17Aug2016 Ordered   7  Fluticasone Propionate 50 MCG/ACT Nasal Suspension; instill 2 sprays into each nostril   once daily; Therapy: 75AVR9595 to (Evaluate:33Yzs0211)  Requested for: 32JPE1463; Last   Rx:17May2017 Ordered   8  Indapamide 2 5 MG Oral Tablet; TAKE 1 TABLET DAILY; Therapy: 77OXA5314 to (Evaluate:21Xqx4526); Last Rx:17Aug2016 Ordered   9  Magnesium Oxide 400 MG Oral Capsule; Take 2 daily; Therapy: 28VGL1064 to (Last Rx:18Feb2016)  Requested for: 32RAM5726 Ordered   10  Magnesium Oxide 400 MG Oral Capsule; TAKE AS DIRECTED; Therapy: 65PBG5793 to (Last Rx:09Aug2013) Ordered   11  Pantoprazole Sodium 40 MG Oral Tablet Delayed Release; TAKE 1 TABLET BY MOUTH    ONCE DAILY; Therapy: 81SUC2182 to (Evaluate:33Kra1684)  Requested for: 86VFX9650; Last    Rx:17May2017 Ordered   12  Premarin 0 625 MG/GM Vaginal Cream; Aplique un poco de crema en el dedo y eche en    la vagina todas las noches por 2 semanas y despues wilver noche si y Sasha Pagoda no  No use    aplicador; Therapy: 38WEH4452 to (Last Rx:35Dzm2742) Ordered   13  Spironolactone 25 MG Oral Tablet; Therapy: 01Azq2559 to (Last Elizabeth Ren)  Requested for: 06Afv2666 Ordered   14  Verapamil HCl - 120 MG Oral Tablet; 1 tab po qod; Therapy: 59QCK2016 to (Last Rx:18Feb2016) Ordered   15  Xarelto 15 MG Oral Tablet; Take 1 tablet daily; Therapy: 86LRR3303 to (Evaluate:10Nov2017)  Requested for: 60JSB8293; Last    Rx:01Fmc8948 Ordered   16  ZyrTEC Allergy 10 MG Oral Capsule; take 1 capsule daily; Therapy: 59IGD7258 to (Evaluate:14Oct2017)  Requested for: 25GDF1662; Last    Rx:17May2017 Ordered    Allergies    1  No Known Drug Allergies    2  Adhesive Tape   3   No Known Environmental Allergies    Immunizations  Influenza --- Domenic Chapa: [id number] High: 24-Sep-2014; Mray Medal: 28-Sep-2015; Series4:  06-Oct-2016   PCV --- Series1: 31-May-2016   PPSV --- Domenic Chapa: 2001   Tdap --- Series1: 12-Mar-2014     Vitals  Signs   Recorded: 39IHT0066 11: 31JP   Systolic: 981  Diastolic: 60  Height: 5 ft 2 in  Weight: 136 lb 10 94 oz  BMI Calculated: 25  BSA Calculated: 1 63    Results/Data  [place] Risk of Heart Attack 37Wbq7442 12:18PM Ash Flick     Test Name Result Flag Reference   [place] - Comparative 14 %     Sex: Female  Age: 68  Total Cholesterol: 209 mg/dL  HDL Cholesterol: 55 mg/dL  Systolic Blood Pressure: 180 mmHg  Diastolic Blood Pressure: 60 mmHg  Diabetes: No  Smoking Status: No   [place] - Ten Year 5 %     Sex: Female  Age: 68  Total Cholesterol: 209 mg/dL  HDL Cholesterol: 55 mg/dL  Systolic Blood Pressure: 268 mmHg  Diastolic Blood Pressure: 60 mmHg  Diabetes: No  Smoking Status: No       Health Management  Tubulovillous adenoma   COLONOSCOPY; every 3 years; Last 46UJG2244; Next Due: 89XGE3946; Active    Attending Note  Attending Note [facility]: Attending Note: I discussed the case with the Resident and reviewed the Resident's note and I agree with the Resident management plan as it was presented to me        Future Appointments    Date/Time Provider Specialty Site   09/21/2017 02:00 PM [place], PCP Nurse Schedule  [address] PCP     Signatures   Electronically signed by : Dorcas Santos DO; Sep 19 2017 12:56PM EST                       (Author)    Electronically signed by : KARLI Marshall ; Sep 20 2017  9:28AM EST                       (Co-author)

## 2018-01-15 NOTE — PROGRESS NOTES
History of Present Illness  HPI: Patient here today for Nurse Visit for Vitamain B12 injection, as order by Dr Anila Nolan on 10/12/15  Injection given  Patient to RTC in one month for next injection  Active Problems    1  Allergic rhinitis due to pollen (477 0) (J30 1)   2  Chest pain on breathing (786 52) (R07 1)   3  Chronic atrial fibrillation (427 31) (I48 2)   4  Chronic kidney disease, stage 3 (585 3) (N18 3)   5  Cough (786 2) (R05)   6  Cystocele, midline (618 01) (N81 11)   7  Encounter for Holly catheter removal (V53 6) (Z46 6)   8  GERD without esophagitis (530 81) (K21 9)   9  History of atrial fibrillation (V12 59) (Z86 79)   10  Hyperlipidemia (272 4) (E78 5)   11  Hypertension (401 9) (I10)   12  Lateral epicondylitis, right elbow (726 32) (M77 11)   13  Left atrial enlargement (429 3) (I51 7)   14  Left renal artery stenosis (440 1) (I70 1)   15  Low back pain (724 2) (M54 5)   16  Lump or mass in breast (611 72) (N63)   17  Need for vaccination with 13-polyvalent pneumococcal conjugate vaccine (V03 82) (Z23)   18  Osteoarthritis of hip (715 95) (M16 9)   19  Osteoarthrosis, 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)   20  Osteopenia (733 90) (M85 80)   21  Pernicious anemia (281 0) (D51 0)   22  Preop examination (V72 84) (Z01 818)   23  Right knee pain (719 46) (M25 561)   24  Seasonal affective disorder (296 99) (F39)   25  Shortness of breath (786 05) (R06 02)   26  Skin pustule (686 9) (L08 9)   27  Tubulovillous adenoma (229 9) (D36 9)   28  Vaginal wall prolapse (618 00) (N81 10)   29  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)   30  Visit for screening mammogram (V76 12) (Z12 31)   31  Vitamin B12 deficiency (266 2) (E53 8)   32  Weight loss (783 21) (R63 4)    Current Meds   1  Benicar 40 MG Oral Tablet; TAKE 1 TABLET DAILY; Therapy: 34Hmz0827 to (Last Rx:15Nov2013)  Requested for: 09PDM9989 Ordered   2  Bystolic 5 MG Oral Tablet; Therapy: (Recorded:18Feb2016) to Recorded   3   Calcium 500 +D 500-400 MG-UNIT Oral Tablet; Therapy: (QEADUVX89VYI6580) to Recorded   4  Cyanocobalamin 1000 MCG/ML Injection Solution; INJECT 1 ML INTRAMUSCULARLY   ONCE A MONTH; Last Rx:2015 Ordered   5  Indapamide 2 5 MG Oral Tablet; TAKE 1 TABLET DAILY; Therapy: 56FPF9622 to (Evaluate:2016); Last Rx:72Iem3521 Ordered   6  Magnesium Oxide 400 MG Oral Capsule; Take 2 daily; Therapy: 38SAZ8725 to (Last Rx:2016)  Requested for: 68OXV1928 Ordered   7  Magnesium Oxide 400 MG Oral Capsule; TAKE AS DIRECTED; Therapy: 07JWH4772 to (Last Rx:29Jkw5727) Ordered   8  Pantoprazole Sodium 40 MG Oral Tablet Delayed Release; TAKE 1 TABLET BY MOUTH   ONCE DAILY; Therapy: 04XFA3976 to ([facility])  Requested for: 35BFD5166; Last   Rx:75Dgv8255 Ordered   9  Premarin 0 625 MG/GM Vaginal Cream; Aplique un poco de crema en el dedo y eche en   la vagina todas las noches por 2 semanas y despues wilver noche si y [place] no  No use   aplicador; Therapy: 44WPX0596 to (Last Rx:93Jgs6146) Ordered   10  Spironolactone 25 MG Oral Tablet; Therapy: 82Mew9504 to (Last Juan F Morale)  Requested for: 44Txc4765 Ordered   11  Verapamil HCl - 120 MG Oral Tablet; 1 tab po qod; Therapy: 56LPE0857 to (Last Rx:2016) Ordered   12  Xarelto 15 MG Oral Tablet; Take 1 tablet daily; Therapy: 71EBJ0184 to (Evaluate:04Lzx4312)  Requested for: 30VXX2400; Last    Rx:2016 Ordered   13  ZyrTEC Allergy 10 MG Oral Capsule; take 1 capsule daily; Therapy: 79UNS7591 to (Evaluate:2016)  Requested for: 78TOL9006; Last    Rx:41Mnw5132 Ordered    Allergies    1  No Known Drug Allergies    2  Adhesive Tape   3   No Known Environmental Allergies    Plan  Vitamin B12 deficiency    · Cyanocobalamin 1000 MCG/ML Injection Solution    Future Appointments    Date/Time Provider Specialty Site   2016 10:30 AM [place], PCP Nurse Schedule   [facility] PCP   2016 01:20 PM Specialty Clinic, Podiatry   [facility] CLINI   08/17/2016 08:45 AM Lin Limon DO Internal Medicine ST [address] [place] [address] PCP   05/25/2017 01:00 PM P[address], Urogyn Room  [address]     Signatures   Electronically signed by :  Queenie Villa, ; Jul 8 2016  3:05PM EST                       (Author)    Electronically signed by : Mirna Farias DO; Jul 25 2016  3:14PM EST                       (Acknowledgement)    Electronically signed by : KARLI Oliveros ; Jul 26 2016  8:42AM EST                       (Review)

## 2018-01-15 NOTE — PROGRESS NOTES
Chief Complaint  PT  CAME INTO THE OFFICE TODAY FOR A NURSE VISIT FOR A B12 INJECTION ORDERED BY DR Marisol Zhang ON AUGUST 17, 2016  PT  BROUGHT HER OWN SERUM      Active Problems    1  Allergic rhinitis due to pollen (477 0) (J30 1)   2  Breast pain, right (611 71) (N64 4)   3  Chronic atrial fibrillation (427 31) (I48 2)   4  Chronic kidney disease, stage 3 (585 3) (N18 3)   5  Cough (786 2) (R05)   6  Cystocele, midline (618 01) (N81 11)   7  Encounter for Holly catheter removal (V53 6) (Z46 6)   8  Encounter for screening mammogram for breast cancer (V76 12) (Z12 31)   9  GERD without esophagitis (530 81) (K21 9)   10  History of atrial fibrillation (V12 59) (Z86 79)   11  Hyperlipidemia (272 4) (E78 5)   12  Hypertension (401 9) (I10)   13  Lateral epicondylitis, right elbow (726 32) (M77 11)   14  Left atrial enlargement (429 3) (I51 7)   15  Left renal artery stenosis (440 1) (I70 1)   16  Low back pain (724 2) (M54 5)   17  Lump or mass in breast (611 72) (N63)   18  Need for prophylactic vaccination and inoculation against influenza (V04 81) (Z23)   19  Need for vaccination with 13-polyvalent pneumococcal conjugate vaccine (V03 82) (Z23)   20  Onychomycosis of toenail (110 1) (B35 1)   21  Osteoarthritis of hip (715 95) (M16 9)   22  Osteoarthrosis, 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)   23  Osteopenia (733 90) (M85 80)   24  Pain due to onychomycosis of toenails of both feet (110 1,729 5)    (B35 1,M79 675,M79 674)   25  Pernicious anemia (281 0) (D51 0)   26  Right knee pain (719 46) (M25 561)   27  Seasonal affective disorder (296 99) (F39)   28  Shortness of breath (786 05) (R06 02)   29  Skin pustule (686 9) (L08 9)   30  Tubulovillous adenoma (229 9) (D36 9)   31  Vaginal wall prolapse (618 00) (N81 10)   32  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)   33  Vitamin B12 deficiency (266 2) (E53 8)    Current Meds   1  Benicar 40 MG Oral Tablet; TAKE 1 TABLET DAILY;    Therapy: 63Cyg7479 to (Last Rx:95Ika6829)  Requested for: 11ZXO5540 Ordered   2  Benzonatate 100 MG Oral Capsule; TAKE 1 CAPSULE 3 TIMES DAILY AS NEEDED; Therapy: 14LBL1833 to (Evaluate:23Hzu9445)  Requested for: 42XNX5033; Last   Rx:78Ifm2171 Ordered   3  Bystolic 5 MG Oral Tablet; Therapy: (Recorded:09Rro5625) to Recorded   4  Calcium 500 +D 500-400 MG-UNIT Oral Tablet; Therapy: ([facility] to Recorded   5  Ciclopirox 8 % External Solution; APPLY AND GENTLY MASSAGE INTO AFFECTED   AREA(S) TWICE DAILY; Therapy: 48REY7690 to (Last Rx:91Mwg1676) Ordered   6  Cyanocobalamin 1000 MCG/ML Injection Solution; INJECT 1 ML INTRAMUSCULARLY   ONCE A MONTH  Requested for: 17Aug2016; Last Rx:17Aug2016 Ordered   7  Fluticasone Propionate 50 MCG/ACT Nasal Suspension; instill 2 sprays into each nostril   once daily; Therapy: 86MBY8469 to (Sandoval Rod)  Requested for: 85RCZ4264; Last   Rx:19Qcc7251 Ordered   8  Indapamide 2 5 MG Oral Tablet; TAKE 1 TABLET DAILY; Therapy: 37XIU2734 to (Evaluate:63Hzw5283); Last Rx:82Efy3478 Ordered   9  Magnesium Oxide 400 MG Oral Capsule; Take 2 daily; Therapy: 59VAO6130 to (Last Rx:18Feb2016)  Requested for: 77YBG0616 Ordered   10  Magnesium Oxide 400 MG Oral Capsule; TAKE AS DIRECTED; Therapy: 26UUT3050 to (Last Rx:50Etc7215) Ordered   11  Pantoprazole Sodium 40 MG Oral Tablet Delayed Release; TAKE 1 TABLET BY MOUTH    ONCE DAILY; Therapy: 98YAC2245 to (22 757154)  Requested for: 01QQP7206; Last    Rx:87Fdt1140 Ordered   12  Premarin 0 625 MG/GM Vaginal Cream; Aplique un poco de crema en el dedo y eche en    la vagina todas las noches por 2 semanas y despues wilver noche si y [employer]val [employer] no  No use    aplicador; Therapy: 06ZYY3724 to (Last Rx:10Cil5409) Ordered   13  Spironolactone 25 MG Oral Tablet; Therapy: 41Ggn6931 to (Last Jhonathan Cobia)  Requested for: 41Omb8002 Ordered   14  Verapamil HCl - 120 MG Oral Tablet; 1 tab po qod; Therapy: 65JDY8660 to (Last Rx:18Feb2016) Ordered   15   Xarelto 15 MG Oral Tablet; Take 1 tablet daily; Therapy: 37MYK8591 to (Evaluate:10Nov2017)  Requested for: 62ZHW6137; Last    Rx:32Xtn5424 Ordered   16  ZyrTEC Allergy 10 MG Oral Capsule; take 1 capsule daily; Therapy: 33PZJ8874 to (Evaluate:28Oct2016)  Requested for: 63MZO7977; Last    Rx:90Qhw8449 Ordered    Allergies    1  No Known Drug Allergies    2  Adhesive Tape   3   No Known Environmental Allergies    Future Appointments    Date/Time Provider Specialty Site   03/14/2017 09:00 AM [place], [facility] Nurse Schedule  [address] PCP   05/17/2017 09:05 AM Cat Hines DO Internal Medicine [address] PCP   05/25/2017 01:00 PM [facility], Urogyn Room  [facility]     Signatures   Electronically signed by : Maggie Gill, ; Feb 14 2017 10:34AM EST                       (Author)    Electronically signed by : Irma Guido DO; Feb 18 2017 10:56AM EST                       (Author)

## 2018-01-16 ENCOUNTER — GENERIC CONVERSION - ENCOUNTER (OUTPATIENT)
Dept: OTHER | Facility: OTHER | Age: 78
End: 2018-01-16

## 2018-01-16 LAB — O+P STL CONC: NORMAL

## 2018-01-16 NOTE — PROGRESS NOTES
Patient Health Assessment    Date:            06/09/2016  Blood Pressure:  129/71  Pulse:           80  Age:             75  Weight:          0 lbs  Height/Length:   0' 0"  Body Mass Index: 0 0  Provider:        STEPHANIE  [facility]: [facility]    Heart Condition    High Blood Pressure    Pacemaker    Heart Surgery  Medications: Benicar HCT    Xarelto    Verapamil    Atorvastatin    Bystolic    Spironlactone    Pantoprazole Sodium    Magnesium    Unknow ( Patient to bring Medication list)  Allergies:  Since Last Visit: Medical Alert: No Change    Medications: No Change    Allergies:        No Change  Pain Scale Type: Numeric Pain ScalePain Level: 0  Description: pt presents due to tooth on UR breaking approx one month ago  pt  reports no pain only sens  to hot and cold sometimes when eating/drinking  1 PA   taken - no visible PARL or other pathology, clinical exam reveals enamel  fracture of buccal of both #4 and #5  resin filling suggested  pt does not need to premed according to her doc and to dr sands in a previous  note  Resin #4 F, #5 F    Pt presents for restorative #4 F, #5 F    H,  Patient denies any changes    Caries #4 F, #5 F    Administered 1 7  cc of 2% Lidocaine with 1:100k epi via infiltration/block  Caries excavated  vividbondII bond placed and light cured  Restored with  Beautifil A2 on #4 and alph aII bulk fill A3 on #5  Occlusion verified,  contacts adequate, and restoration polished with finishing burs    Pt left in  good health     dr lemus/dr sands    NV: probing depths, vertical bws, perio maintenance    ----- Signed on Thursday, June 09, 2016 at 12:20:50 PM  -----  ----- Provider: MARY - Resident Three, Dentist -- Clinic: [facility]  -----

## 2018-01-16 NOTE — PROGRESS NOTES
Chief Complaint  Patient here for nurse visit for vitamin B 12 injection as ordered by Dr Rajeev Mcginnis on 10/12/2015  Injection Given, patient to return in one month for next injection  Active Problems    1  Allergic rhinitis due to pollen (477 0) (J30 1)   2  Chest pain on breathing (786 52) (R07 1)   3  Chronic atrial fibrillation (427 31) (I48 2)   4  Chronic kidney disease, stage 3 (585 3) (N18 3)   5  Cough (786 2) (R05)   6  Cystocele, midline (618 01) (N81 11)   7  Encounter for Holly catheter removal (V53 6) (Z46 6)   8  GERD without esophagitis (530 81) (K21 9)   9  History of atrial fibrillation (V12 59) (Z86 79)   10  Hyperlipidemia (272 4) (E78 5)   11  Hypertension (401 9) (I10)   12  Lateral epicondylitis, right elbow (726 32) (M77 11)   13  Left atrial enlargement (429 3) (I51 7)   14  Left renal artery stenosis (440 1) (I70 1)   15  Low back pain (724 2) (M54 5)   16  Lump or mass in breast (611 72) (N63)   17  Osteoarthritis of hip (715 95) (M16 9)   18  Osteoarthrosis, 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)   19  Osteopenia (733 90) (M85 80)   20  Pernicious anemia (281 0) (D51 0)   21  Preop examination (V72 84) (Z01 818)   22  Right knee pain (719 46) (M25 561)   23  Shortness of breath (786 05) (R06 02)   24  Tubulovillous adenoma (229 9) (D36 9)   25  Vaginal wall prolapse (618 00) (N81 10)   26  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)   27  Visit for screening mammogram (V76 12) (Z12 31)   28  Vitamin B12 deficiency (266 2) (E53 8)   29  Weight loss (783 21) (R63 4)    Current Meds   1  Benicar 40 MG Oral Tablet; TAKE 1 TABLET DAILY; Therapy: 90Pmg6291 to (Last Rx:13Bpb8239)  Requested for: 53JYN4987 Ordered   2  Bystolic 5 MG Oral Tablet; Therapy: (Recorded:54Unt6525) to Recorded   3  Calcium 500 +D 500-400 MG-UNIT Oral Tablet; Therapy: (POYVZSPN:65GBA3877) to Recorded   4  Cyanocobalamin 1000 MCG/ML Injection Solution; INJECT 1 ML INTRAMUSCULARLY   ONCE A MONTH; Last Rx:12Oct2015 Ordered   5   Indapamide 2 5 MG Oral Tablet; TAKE 1 TABLET DAILY; Therapy: 55YCK9647 to (Evaluate:26Jun2016); Last Rx:10Iep6198 Ordered   6  Magnesium Oxide 400 MG Oral Capsule; Take 2 daily; Therapy: 20TLY6773 to (Last Rx:18Feb2016)  Requested for: 29DEZ3190 Ordered   7  Magnesium Oxide 400 MG Oral Capsule; TAKE AS DIRECTED; Therapy: 09FXA2951 to (Last Rx:81Eqp1935) Ordered   8  Pantoprazole Sodium 40 MG Oral Tablet Delayed Release; TAKE 1 TABLET BY MOUTH   ONCE DAILY; Therapy: 20BAT3609 to (Evaluate:32Scx4031)  Requested for: 94Dje3971; Last   Rx:08Aan2908 Ordered   9  Premarin 0 625 MG/GM Vaginal Cream; Aplique un poco de crema en el dedo y eche en   la vagina todas las noches por 2 semanas y despues wilver noche si y Orlando Leavens no  No use   aplicador; Therapy: 33FYB9154 to (Last Rx:81Bnt1376) Ordered   10  Spironolactone 25 MG Oral Tablet; Therapy: 89Ruw7843 to (Last Sarahann Dolphin)  Requested for: 68Lhc7923 Ordered   11  Verapamil HCl - 120 MG Oral Tablet; 1 tab po qod; Therapy: 69TTY9001 to (Last Rx:18Feb2016) Ordered   12  Xarelto 15 MG Oral Tablet; Take 1 tablet daily; Therapy: 55UPN7586 to (Evaluate:62Vgc0967)  Requested for: 45GVV3428; Last    Rx:04Jan2016 Ordered   13  ZyrTEC Allergy 10 MG Oral Capsule; take 1 capsule daily; Therapy: 74HUK9377 to (Evaluate:10Mar2016); Last Rx:12Oct2015 Ordered    Allergies    1  No Known Drug Allergies    2  Adhesive Tape   3   No Known Environmental Allergies    Plan  Vitamin B12 deficiency    · Cyanocobalamin 1000 MCG/ML Injection Solution    Future Appointments    Date/Time Provider Specialty Site   05/31/2016 09:40 AM Ciro Rodríguez MD Internal Medicine [address] PCP   06/07/2016 10:00 AM CATA Ram Nurse Schedule  [address] PCP   05/26/2016 01:25 PM [address]ing [address], [address]     Signatures   Electronically signed by : Deepa Granado, ; May  4 2016  1:40PM EST                       (Author) Electronically signed by : Sourav Gu MD; May  5 2016  9:19AM EST                       (Validation)    Electronically signed by : Brianna Heck DO; May  7 2016 11:59AM EST                       (Review)

## 2018-01-17 ENCOUNTER — GENERIC CONVERSION - ENCOUNTER (OUTPATIENT)
Dept: OTHER | Facility: OTHER | Age: 78
End: 2018-01-17

## 2018-01-17 NOTE — PROGRESS NOTES
Chief Complaint  PT  CAME INTO THE OFFICE TODAY FOR A NURSE VISIT FOR A B12 INJECTION ORDERED BY DR Arther Boeck ON AUGUST 17, 16  PT  BROUGHT HER OWN VIAL OF MEDICATION  Active Problems    1  Allergic rhinitis due to pollen (477 0) (J30 1)   2  Chest pain on breathing (786 52) (R07 1)   3  Chronic atrial fibrillation (427 31) (I48 2)   4  Chronic kidney disease, stage 3 (585 3) (N18 3)   5  Cough (786 2) (R05)   6  Cystocele, midline (618 01) (N81 11)   7  Encounter for Holly catheter removal (V53 6) (Z46 6)   8  GERD without esophagitis (530 81) (K21 9)   9  History of atrial fibrillation (V12 59) (Z86 79)   10  Hyperlipidemia (272 4) (E78 5)   11  Hypertension (401 9) (I10)   12  Lateral epicondylitis, right elbow (726 32) (M77 11)   13  Left atrial enlargement (429 3) (I51 7)   14  Left renal artery stenosis (440 1) (I70 1)   15  Low back pain (724 2) (M54 5)   16  Lump or mass in breast (611 72) (N63)   17  Need for prophylactic vaccination and inoculation against influenza (V04 81) (Z23)   18  Need for vaccination with 13-polyvalent pneumococcal conjugate vaccine (V03 82) (Z23)   19  Onychomycosis of toenail (110 1) (B35 1)   20  Osteoarthritis of hip (715 95) (M16 9)   21  Osteoarthrosis, 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)   22  Osteopenia (733 90) (M85 80)   23  Pernicious anemia (281 0) (D51 0)   24  Preop examination (V72 84) (Z01 818)   25  Right knee pain (719 46) (M25 561)   26  Seasonal affective disorder (296 99) (F39)   27  Shortness of breath (786 05) (R06 02)   28  Skin pustule (686 9) (L08 9)   29  Tubulovillous adenoma (229 9) (D36 9)   30  Vaginal wall prolapse (618 00) (N81 10)   31  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)   32  Viral bronchitis (466 0) (J20 8)   33  Visit for screening mammogram (V76 12) (Z12 31)   34  Vitamin B12 deficiency (266 2) (E53 8)   35  Weight loss (783 21) (R63 4)    Current Meds   1  Benicar 40 MG Oral Tablet; TAKE 1 TABLET DAILY;    Therapy: 58Tul1868 to (Last Rx:28Odv5026)  Requested for: 51DNV6893 Ordered   2  Benzonatate 100 MG Oral Capsule; TAKE 1 CAPSULE 3 TIMES DAILY AS NEEDED; Therapy: 72MFI2999 to (Evaluate:90Zjx1577)  Requested for: 59RIL0920; Last   Rx:45Rou8162 Ordered   3  Bystolic 5 MG Oral Tablet; Therapy: (Recorded:05Fbt7112) to Recorded   4  Calcium 500 +D 500-400 MG-UNIT Oral Tablet; Therapy: (CQSWPVAX:20ADT9987) to Recorded   5  Ciclopirox 8 % External Solution; APPLY AND GENTLY MASSAGE INTO AFFECTED   AREA(S) TWICE DAILY; Therapy: 61ONB1840 to (Last Rx:19Vmk0413) Ordered   6  Cyanocobalamin 1000 MCG/ML Injection Solution; INJECT 1 ML INTRAMUSCULARLY   ONCE A MONTH  Requested for: 17Aug2016; Last Rx:17Aug2016 Ordered   7  Fluticasone Propionate 50 MCG/ACT Nasal Suspension; instill 2 sprays into each nostril   once daily; Therapy: 09HOT2419 to (Kianna Car)  Requested for: 91EEI5373; Last   Rx:67Tbk9248 Ordered   8  Indapamide 2 5 MG Oral Tablet; TAKE 1 TABLET DAILY; Therapy: 26DJK2490 to (Evaluate:07Ydx6001); Last Rx:17Aug2016 Ordered   9  Magnesium Oxide 400 MG Oral Capsule; Take 2 daily; Therapy: 09PWR7191 to (Last Rx:18Feb2016)  Requested for: 55LPW3722 Ordered   10  Magnesium Oxide 400 MG Oral Capsule; TAKE AS DIRECTED; Therapy: 69TXU0915 to (Last Rx:93Zsm6100) Ordered   11  Pantoprazole Sodium 40 MG Oral Tablet Delayed Release; TAKE 1 TABLET BY MOUTH    ONCE DAILY; Therapy: 61GOR2168 to (Tonia Bowen)  Requested for: 02GEN4252; Last    Rx:22Otf9389 Ordered   12  Premarin 0 625 MG/GM Vaginal Cream; Aplique un poco de crema en el dedo y eche en    la vagina todas las noches por 2 semanas y despues wilver noche si y Alpesh Pod no  No use    aplicador; Therapy: 33FGF7296 to (Last Rx:55Mnf5265) Ordered   13  Spironolactone 25 MG Oral Tablet; Therapy: 97Wkl9196 to (Last Georgina Vigil)  Requested for: 35Vwe2258 Ordered   14  Verapamil HCl - 120 MG Oral Tablet; 1 tab po qod; Therapy: 12YGN4291 to (Last Rx:18Feb2016) Ordered   15   Xarelto 15 MG Oral Tablet; Take 1 tablet daily; Therapy: 96OQG6517 to (Evaluate:01Ico3328)  Requested for: 48GJM9791; Last    Rx:04Jan2016 Ordered   16  ZyrTEC Allergy 10 MG Oral Capsule; take 1 capsule daily; Therapy: 11YYV1856 to (Evaluate:28Oct2016)  Requested for: 01EXV4083; Last    Rx:99Dzf8871 Ordered    Allergies    1  No Known Drug Allergies    2  Adhesive Tape   3   No Known Environmental Allergies    Plan  Vitamin B12 deficiency    · Cyanocobalamin 1000 MCG/ML Injection Solution    Future Appointments    Date/Time Provider Specialty Site   12/13/2016 10:00 AM [place], PCP Nurse Schedule  [address] PCP   12/30/2016 01:20 PM Specialty Clinic, Podiatry  [facility] SPECIALTY CLINI   05/25/2017 01:00 PM [facility], [address] [facility]     Signatures   Electronically signed by : Dimitri Contreras, ; Nov 14 2016 10:14AM EST                       (Author)    Electronically signed by : Isabelle Underwood DO; Nov 15 2016  9:02AM EST                       (Co-author)    Electronically signed by : KARLI Byers ; Nov 15 2016  9:31AM EST                       (Co-author)

## 2018-01-18 NOTE — PROGRESS NOTES
Chief Complaint  PT  CAME INTO THE OFFICE TODAY FOR A NURSE VISIT FOR A B12 INJECTION ORDERED BY DR Vivi Lott ON 8-17-16  PT  BROUGHT IN HER OWN SERUM  1ML GIVEN IN RIGHT DELTOID  Active Problems    1  Allergic rhinitis due to pollen (477 0) (J30 1)   2  Breast pain, right (611 71) (N64 4)   3  Chronic atrial fibrillation (427 31) (I48 2)   4  Chronic kidney disease, stage 3 (585 3) (N18 3)   5  Cough (786 2) (R05)   6  Cystocele, midline (618 01) (N81 11)   7  Encounter for Holly catheter removal (V53 6) (Z46 6)   8  Encounter for screening mammogram for breast cancer (V76 12) (Z12 31)   9  GERD without esophagitis (530 81) (K21 9)   10  History of atrial fibrillation (V12 59) (Z86 79)   11  Hyperlipidemia (272 4) (E78 5)   12  Hypertension (401 9) (I10)   13  Lateral epicondylitis, right elbow (726 32) (M77 11)   14  Left atrial enlargement (429 3) (I51 7)   15  Left renal artery stenosis (440 1) (I70 1)   16  Low back pain (724 2) (M54 5)   17  Lump or mass in breast (611 72) (N63)   18  Need for prophylactic vaccination and inoculation against influenza (V04 81) (Z23)   19  Need for vaccination with 13-polyvalent pneumococcal conjugate vaccine (V03 82) (Z23)   20  Onychomycosis of toenail (110 1) (B35 1)   21  Osteoarthritis of hip (715 95) (M16 9)   22  Osteoarthrosis, 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)   23  Osteopenia (733 90) (M85 80)   24  Pain due to onychomycosis of toenails of both feet (110 1,729 5)    (B35 1,M79 675,M79 674)   25  Pernicious anemia (281 0) (D51 0)   26  Right knee pain (719 46) (M25 561)   27  Seasonal affective disorder (296 99) (F39)   28  Shortness of breath (786 05) (R06 02)   29  Skin pustule (686 9) (L08 9)   30  Tubulovillous adenoma (229 9) (D36 9)   31  Vaginal wall prolapse (618 00) (N81 10)   32  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)   33  Vitamin B12 deficiency (266 2) (E53 8)    Current Meds   1  Benicar 40 MG Oral Tablet; TAKE 1 TABLET DAILY;    Therapy: 52Mmm1332 to (Last Rx:97Czx4001)  Requested for: 39XEQ5336 Ordered   2  Benzonatate 100 MG Oral Capsule; TAKE 1 CAPSULE 3 TIMES DAILY AS NEEDED; Therapy: 59TWC2620 to (Evaluate:93Nky3335)  Requested for: 24UXI6677; Last   Rx:87Pfb5628 Ordered   3  Bystolic 5 MG Oral Tablet; Therapy: (Recorded:95Xxh3507) to Recorded   4  Calcium 500 +D 500-400 MG-UNIT Oral Tablet; Therapy: (EISYVW:94VWV5154) to Recorded   5  Ciclopirox 8 % External Solution; APPLY AND GENTLY MASSAGE INTO AFFECTED   AREA(S) TWICE DAILY; Therapy: 39RPV1809 to (Last Rx:85Rzt8182) Ordered   6  Cyanocobalamin 1000 MCG/ML Injection Solution; INJECT 1 ML INTRAMUSCULARLY   ONCE A MONTH  Requested for: 17Aug2016; Last Rx:17Aug2016 Ordered   7  Fluticasone Propionate 50 MCG/ACT Nasal Suspension; instill 2 sprays into each nostril   once daily; Therapy: 74JJC6677 to (Titi Costello)  Requested for: 64EXO8087; Last   Rx:68Wzt1299 Ordered   8  Indapamide 2 5 MG Oral Tablet; TAKE 1 TABLET DAILY; Therapy: 79ISK6946 to (Evaluate:26Nvd7740); Last Rx:17Aug2016 Ordered   9  Magnesium Oxide 400 MG Oral Capsule; Take 2 daily; Therapy: 91VLE9343 to (Last Rx:18Feb2016)  Requested for: 48CGN5070 Ordered   10  Magnesium Oxide 400 MG Oral Capsule; TAKE AS DIRECTED; Therapy: 87OXX0871 to (Last Rx:55Iyz4647) Ordered   11  Pantoprazole Sodium 40 MG Oral Tablet Delayed Release; TAKE 1 TABLET BY MOUTH    ONCE DAILY; Therapy: 22BVU9704 to (Isabella Blase)  Requested for: 48QVJ7022; Last    Rx:86Gwz0852 Ordered   12  Premarin 0 625 MG/GM Vaginal Cream; Aplique un poco de crema en el dedo y eche en    la vagina todas las noches por 2 semanas y despues wilver noche si y Delorse Chris no  No use    aplicador; Therapy: 17YSJ6344 to (Last Rx:52Aub5716) Ordered   13  Spironolactone 25 MG Oral Tablet; Therapy: 86Gkd0092 to (Last Anahy Blank)  Requested for: 97Pyp0651 Ordered   14  Verapamil HCl - 120 MG Oral Tablet; 1 tab po qod;     Therapy: 57UWP7624 to (Last Rx:18Feb2016) Ordered 15  Xarelto 15 MG Oral Tablet; Take 1 tablet daily; Therapy: 15MNV3487 to (Evaluate:10Nov2017)  Requested for: 61YGK3713; Last    Rx:01Xeq6574 Ordered   16  ZyrTEC Allergy 10 MG Oral Capsule; take 1 capsule daily; Therapy: 30RVF6256 to (Evaluate:28Oct2016)  Requested for: 59HRB5703; Last    Rx:14Suh7332 Ordered    Allergies    1  No Known Drug Allergies    2  Adhesive Tape   3  No Known Environmental Allergies    Plan  Vitamin B12 deficiency    · Cyanocobalamin 1000 MCG/ML Injection Solution    Future Appointments    Date/Time Provider Specialty Site   05/17/2017 02:00 PM [place] Nurse Schedule  [address] PCP   05/17/2017 09:05 AM Elodia Pfeiffer DO Internal Medicine [address] PCP   05/25/2017 01:00 PM [facility], Urogyn Room  [facility]     Signatures   Electronically signed by : Isabel Paige, ; Apr 13 2017  1:33PM EST                       (Author)    Electronically signed by : Mauricio Marks DO; Apr 14 2017  1:12AM EST                       (Author)    Electronically signed by : Berenice Nesbitt DO;  Apr 14 2017  7:41AM EST                       (Review)

## 2018-01-22 VITALS
WEIGHT: 141.54 LBS | HEIGHT: 62 IN | BODY MASS INDEX: 26.05 KG/M2 | TEMPERATURE: 98 F | DIASTOLIC BLOOD PRESSURE: 60 MMHG | HEART RATE: 96 BPM | SYSTOLIC BLOOD PRESSURE: 130 MMHG

## 2018-01-22 VITALS
HEART RATE: 92 BPM | TEMPERATURE: 97.8 F | HEIGHT: 62 IN | SYSTOLIC BLOOD PRESSURE: 132 MMHG | BODY MASS INDEX: 25.15 KG/M2 | DIASTOLIC BLOOD PRESSURE: 70 MMHG | WEIGHT: 136.69 LBS

## 2018-01-22 VITALS
BODY MASS INDEX: 25.21 KG/M2 | SYSTOLIC BLOOD PRESSURE: 158 MMHG | HEIGHT: 62 IN | DIASTOLIC BLOOD PRESSURE: 78 MMHG | WEIGHT: 137 LBS

## 2018-01-22 VITALS
DIASTOLIC BLOOD PRESSURE: 60 MMHG | WEIGHT: 136.69 LBS | BODY MASS INDEX: 25.15 KG/M2 | HEIGHT: 62 IN | SYSTOLIC BLOOD PRESSURE: 104 MMHG

## 2018-01-23 VITALS
WEIGHT: 134.92 LBS | HEART RATE: 92 BPM | SYSTOLIC BLOOD PRESSURE: 110 MMHG | BODY MASS INDEX: 24.83 KG/M2 | DIASTOLIC BLOOD PRESSURE: 74 MMHG | TEMPERATURE: 97.5 F | HEIGHT: 62 IN

## 2018-01-23 NOTE — PROGRESS NOTES
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)   4  Chest pain (786 50) (R07 9)   5  Chronic atrial fibrillation (427 31) (I48 2)   6  Chronic kidney disease, stage 3 (585 3) (N18 3)   7  Cystocele, midline (618 01) (N81 11)   8  Encounter for Holly catheter removal (V53 6) (Z46 6)   9  Encounter for screening mammogram for breast cancer (V76 12) (Z12 31)   10  Fatigue (780 79) (R53 83)   11  GERD without esophagitis (530 81) (K21 9)   12  H/O sick sinus syndrome (V12 59) (Z86 79)   13  Hallux abducto valgus, bilateral (735 0) (M20 11,M20 12)   14  History of atrial fibrillation (V12 59) (Z86 79)   15  History of chest pain (V13 89) (Z87 898)   16  Hospital discharge follow-up (V67 59) (Z09)   17  Hyperlipidemia (272 4) (E78 5)   18  Hypertension (401 9) (I10)   19  Hypokalemia (276 8) (E87 6)   20  Lateral epicondylitis, right elbow (726 32) (M77 11)   21  Left atrial enlargement (429 3) (I51 7)   22  Left renal artery stenosis (440 1) (I70 1)   23  Lipoma of right upper extremity (214 8) (D17 21)   24  Low back pain (724 2) (M54 5)   25  Lump or mass in breast (611 72) (N63 0)   26  Medicare annual wellness visit, initial (V70 0) (Z00 00)   27  Nausea with vomiting (787 01) (R11 2)   28  Need for prophylactic vaccination and inoculation against influenza (V04 81) (Z23)   29  Need for vaccination with 13-polyvalent pneumococcal conjugate vaccine (V03 82) (Z23)   30  Onychomycosis of toenail (110 1) (B35 1)   31  Osteoarthritis of hip (715 95) (M16 9)   32  Osteoarthrosis, 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)   33  Osteopenia (733 90) (M85 80)   34   Pain due to onychomycosis of toenails of both feet (110 1,729 5)    (B35 1,M79 675,M79 674)   35  Pernicious anemia (281 0) (D51 0)   36  Right knee pain (719 46) (M25 561)   37  Seasonal affective disorder (296 99) (F33 9)   38  Shortness of breath (786 05) (R06 02)   39  Skin pustule (686 9) (L08 9)   40  Tubulovillous adenoma (229 9) (D36 9)   41  Vaginal wall prolapse (618 00) (N81 10)   42  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)   43  Vitamin B12 deficiency (266 2) (E53 8)    Current Meds   1  Benicar 40 MG Oral Tablet; TAKE 1 TABLET DAILY; Therapy: 22Tia1253 to (Last Rx:72Orj0014)  Requested for: 32ZEZ5969 Ordered   2  Benzonatate 100 MG Oral Capsule; TAKE 1 CAPSULE 3 TIMES DAILY AS NEEDED; Therapy: 88ZCU0167 to (Evaluate:76Ugr7189)  Requested for: 62ARQ2366; Last   Rx:31Hka8692 Ordered   3  Bystolic 5 MG Oral Tablet; Therapy: (Recorded:52Ejq3444) to Recorded   4  Calcium 500 +D 500-400 MG-UNIT Oral Tablet; Therapy: (LJHWYM:66VTS7416) to Recorded   5  Ciclopirox 8 % External Solution; APPLY AND GENTLY MASSAGE INTO AFFECTED   AREA(S) TWICE DAILY; Therapy: 59PSF1619 to (Last Rx:29Xzg3026) Ordered   6  Cyanocobalamin 1000 MCG/ML Injection Solution; INJECT 1 ML INTRAMUSCULARLY   ONCE A MONTH  Requested for: 93Mke8565; Last Rx:63Iij9594 Ordered   7  Fluticasone Propionate 50 MCG/ACT Nasal Suspension; instill 2 sprays into each nostril   once daily; Therapy: 18QQO4160 to (Evaluate:89Dmn0943)  Requested for: 21Enm0983; Last   Rx:60Zaa7219 Ordered   8  Indapamide 2 5 MG Oral Tablet; TAKE 1 TABLET DAILY; Therapy: 20CDW3007 to (Evaluate:17Iqx9446); Last Rx:38Zds6863 Ordered   9  Magnesium Oxide 400 MG Oral Capsule; Take 2 daily; Therapy: 80XOV3870 to (Last Rx:81Rmj7287)  Requested for: 40RFC1906 Ordered   10  Magnesium Oxide 400 MG Oral Capsule; TAKE AS DIRECTED; Therapy: 03FGW7964 to (Last Rx:36Vnn0535) Ordered   11   Pantoprazole Sodium 40 MG Oral Tablet Delayed Release; TAKE 1 TABLET BY MOUTH    ONCE DAILY; Therapy: 11DGO4780 to (Evaluate:49Lnp1932)  Requested for: 10WQD2272; Last    Rx:53Tnd2018 Ordered   12  Premarin 0 625 MG/GM Vaginal Cream; Aplique un poco de crema en el dedo y eche en    la vagina todas las noches por 2 semanas y despues wilver noche si y Heddie Kobs no  No use    aplicador; Therapy: 19UDY0234 to (Last Rx:38Hde7316) Ordered   13  Spironolactone 25 MG Oral Tablet; Therapy: 79Uhq1687 to (Last [place])  Requested for: 04Whe7422 Ordered   14  Verapamil HCl - 120 MG Oral Tablet; 1 tab po qod; Therapy: 51QWA6723 to (Last Rx:87Mbm2756) Ordered   15  Xarelto 15 MG Oral Tablet; Take 1 tablet daily; Therapy: 75WZE1892 to (Nicki Sari)  Requested for: 67FBP6761; Last    Rx:43Yva8824 Ordered   16  ZyrTEC Allergy 10 MG Oral Capsule; take 1 capsule daily; Therapy: 26KRY8468 to (Evaluate:14Oct2017)  Requested for: 27KLN6098; Last    Rx:22Ylt8594 Ordered    Allergies    1  No Known Drug Allergies    2  Adhesive Tape   3   No Known Environmental Allergies    Plan  Vitamin B12 deficiency    · Cyanocobalamin 1000 MCG/ML Injection Solution    Future Appointments    Date/Time Provider Specialty Site   01/05/2018 11:15 AM Corbin Hsu MD Cardiology [address]     Signatures   Electronically signed by : Jodi Ashley, ; Dec 26 2017  9:52AM EST                       (Author)    Electronically signed by : Margarita Liriano DO; Dec 29 2017 11:40PM EST                       (Author)    Electronically signed by : Roslyn Bahena DO; Dec 30 2017  3:32PM EST                       (Review)

## 2018-01-23 NOTE — RESULT NOTES
Verified Results  NM MYOCARDIAL PERFUSION SPECT (RX STRESS AND/OR REST) 21MWV3190 07:29AM Cardio, Willia Flaming     Test Name Result Flag Reference   NM MYOCARDIAL PERFUSION SPECT (RX STRESS AND/OR REST) (Report)     [address] [address]   [phone]     Rest/Stress Gated SPECT Myocardial Perfusion Imaging After Regadenoson     Patient: David Noriega   MR number: [id number]   Account number: [de-identified]   : 1940   Age: 68 years   Gender: Female   Status: Outpatient   Location: [address] [place] [address] and [facility]   Height: 62 in   Weight: 136 lb   BP: 120/ 68 mmHg     Allergies: OTHER     Diagnosis: R07 9 - Chest pain, unspecified     Interpreting Physician: Hayley Zhu MD   Referring Physician: Agatha Gaffney   Primary Physician: Branden Vila   Technician: NAMAN Macedo   RN: Adams Pierre RN   Group: [facility]   Report Prepared by[de-identified] Adams Pierre RN     INDICATIONS: Detection for coronary artery disease  HISTORY: The patient is a 68year old  female  History of osteoarthritis, chronic nausea/vomiting, pernicious anemia Chest pain status: recent onset chest pain and seen in the ED 2018 Coronary artery disease risk factors: dyslipidemia, hypertension, and post-menopausal state  Cardiovascular history: arrhythmia (atrial fib) Prior cardiovascular procedures: pacemaker  Co-morbidity: history of chronic renal disease  Medications: a beta blocker, an ARB, a calcium channel blocker, a diuretic and xarelto     REST ECG: Paced rhythm  PROCEDURE: The study was performed at the [address] [facility]  A regadenoson infusion pharmacologic stress test was performed  Gated SPECT myocardial perfusion imaging was performed after stress  Systolic blood pressure was   120 mmHg, at the start of the study  Diastolic blood pressure was 68 mmHg, at the start of the study   The heart rate was 74 bpm, at the start of the study  IV double checked  Regadenoson protocol:   HR bpm SBP mmHg DBP mmHg Symptoms   Baseline 74 120 68 none   Immediate 81 122 64 none   1 min 71 120 64 none   No medications or fluids given  STRESS SUMMARY: Duration of pharmacologic stress was 3 min  There was no chest pain during stress  The stress test was terminated due to protocol completion  Pre oxygen saturation: 99 %  Peak oxygen saturation: 100 %  There were no stress   arrhythmias or conduction abnormalities  The stress ECG was non-diagnostic  ISOTOPE ADMINISTRATION:   Resting isotope administration Stress isotope administration   Agent Tetrofosmin Tetrofosmin   Dose 10 16 mCi 30 7 mCi   Date 01/05/2018 --   Injection time 08:10 09:31   Imaging time 08:55 10:25   Injection-image interval 60 min 54 min     The radiopharmaceutical was injected at the peak effect of pharmacologic stress  IMAGE PROPERTIES:   Chest: circumference 36 in, B cup  MYOCARDIAL PERFUSION IMAGING:   The image quality was good  Rotating projection images reveal mild patient motion  The TID ratio was 1 09  PERFUSION DEFECTS:   - There were no perfusion defects  GATED SPECT:   The calculated left ventricular ejection fraction was 76 %  There was no left ventricular regional abnormality  SUMMARY:   - Stress results: There was no chest pain during stress  - ECG conclusions: The stress ECG was non-diagnostic    - Perfusion imaging: There were no perfusion defects    - Gated SPECT: The calculated left ventricular ejection fraction was 76 %  There was no left ventricular regional abnormality  IMPRESSIONS: Normal study after pharmacologic vasodilation  Myocardial perfusion imaging was normal at rest and with stress   Left ventricular systolic function was normal      Prepared and signed by     Franc Cuevas MD   Signed 01/05/2018 14:34:30

## 2018-01-23 NOTE — RESULT NOTES
Verified Results  (1) STOOL ENTERIC BACTERIAL PATHOGENS PANEL BY PCR 06CHL2254 10:11AM [facility]     Test Name Result Flag Reference   SHIGA TOXIN 1/SHIGA TOXIN 2 GENES PCR None Detected  None Detected   CAMPYLOBACTER SP (JEJUNI AND COLI) PCR None Detected  None Detected   SHIGELLA SP /ENTEROINVASIVE E  COLI (EIEC) PCR None Detected  None Detected   SALMONELLA SP PCR None Detected  None Detected

## 2018-01-23 NOTE — RESULT NOTES
Discussion/Summary   Please call pt and tell her stool studies are negative, good news     Verified Results  (1) OVA AND PARASITES EXAM 22HYJ4600 10:11AM Marlo Anderson Order Number: [id number]     Test Name Result Flag Reference   O&P CONC  EXAM      No ova, cysts, or parasites seen     One negative specimen does not rule out the possibility of a  parasitic infection  These results were obtained using wet preparation(s) and trichrome  stained smear  This test does not include testing for Cryptosporidium  parvum, Cyclospora, or Microsporidia      Performed at:  [address] [place] [address]  [id number]  : Shilpi Hernandez MD, Phone:  [phone]

## 2018-01-23 NOTE — MISCELLANEOUS
Message  GI Reminder Recall Lakia Hays:   Date: 01/17/2018   Dear Rosalba Aguilar:     Review of our records shows you are due for the following: hospital follow up  Please call the following office to schedule your appointment:   [address] [place] [address] [phone]  We look forward to hearing from you! Sincerely,     [facility]      Signatures   Electronically signed by :  Darlene Homans, ; Jan 17 2018  1:31PM EST                       (Author)

## 2018-01-23 NOTE — MISCELLANEOUS
Assessment    1  Atypical chest pain (786 59) (R07 89)   2  Hospital discharge follow-up (V67 59) (Z09)   3  H/O sick sinus syndrome (V12 59) (Z86 79)   4  Hypokalemia (276 8) (E87 6)    Plan  Chronic atrial fibrillation, PMH: H/O sick sinus syndrome    · *1 - [facility] CARDIOLOGY Co-Management  *  Status: Hold For - Scheduling   Requested for: 01HAD9845   Ordered; For: Chronic atrial fibrillation, PMH: H/O sick sinus syndrome; Ordered By: Siva Salas Performed:  Due: 98DYK4313  Care Summary provided  : Yes  H/O sick sinus syndrome, History of chest pain    · * NM MYOCARDIAL PERFUSION SPECT (STRESS AND/OR REST); Status:Need  Information - Financial Authorization; Requested for:[id number];    Perform:[facility]; Due:90Sfo4113; Ordered;  For:H/O sick sinus syndrome, History of chest pain; Ordered By:David Silva;  Hypokalemia    · (1) POTASSIUM; Status:Active; Requested for:99Pln4070;    Perform:[facility]; Due:94Xem2536; Ordered;  For:Hypokalemia; Ordered By:Hugh Silva;    Discussion/Summary  Discussion Summary:   1  Chest pain, atypical - Patient reports no further chest pain since discharge  patient offers no complaints today  Discharge medications reviewed with patient  Patient will require further risk assessment with stress testing  Patient was to follow up with her cardiologist Dr Madelaine Anton, however she was not able to schedule an appointment yet  patient expresses interest today actually consolidating her care by seeing providers at 1 location  I will refer her to cardiology clinic to get established with them  In the interim I will refer her for myocardial perfusion stress testing  she will follow up with her PCP Dr Guille Muñoz in approximately 3-4 months for routine follow-up    2   Hypokalemia - present on recent admission, likely in the setting of nausea and vomiting 1 week prior preceding her admission for which she was seen in the clinic for on 12/6/17 (nausea and vomiting has resolved) patient's potassium was repleted in the hospital, repeat K was 3 9  I will order repeat potassium level to ensure patient does not need further repletion  she is currently on K-sparing diuretic  Counseling Documentation With Imm: The patient was counseled regarding diagnostic results, instructions for management, risk factor reductions, prognosis, patient and family education, risks and benefits of treatment options, importance of compliance with treatment  Medication SE Review and Pt Understands Tx: Possible side effects of new medications were reviewed with the patient/guardian today  The treatment plan was reviewed with the patient/guardian  The patient/guardian understands and agrees with the treatment plan   Self Referrals:   Self Referrals: No      History of Present Illness  TCM Communication [facility]: The patient is being contacted for follow-up after hospitalization  Hospital records were reviewed  She was hospitalized at and [facility]  The dates of hospitalization: 12/8/17 - 12/9/17, Admitted for atypical chest pain - cardiac rule-out  Diagnosis: Atypical chest pain - likely musculoskeletal in nature  She was discharged to home  Medications reviewed and updated today  She scheduled a follow up appointment  Follow-up appointments with other specialists: Patient was instructed to follow-up with Dr Lynn Fischer (Cardiology) for outpatient stress testing  Communication performed and completed by       HPI: Ms Sweta Lopez S 49-year-old female with a past medical history of AFib/sick sinus tachy Silas syndrome on chronic anticoagulation status post pacemaker, hypertension, CKD stage 3, GERD, here in clinic today for hospital follow-up  patient was recently hospitalized from 12/8/17 to 12/9/17 for left-sided chest pain  Patient was admitted for ACS rule out, chest pain resolved during the hospitalization   She was kept for further evaluation as her 2nd troponin was elevated at 0 05 - these were trended to peak and remainder were negative  no concerning EKG changes noted  chest pain was thought to be musculoskeletal in nature the patient was advised to follow up with her cardiologist for outpatient stress test       Hospital Based Practices Required Assessment:   Pain Assessment   the patient states they do not have pain  (on a scale of 0 to 10, the patient rates the pain at 0 )    Prefered Language is  Canadian  Primary Language is  Canadian  Review of Systems  Complete-Female:   Constitutional: No fever, no chills, feels well, no tiredness, no recent weight gain or weight loss  Eyes: No complaints of eye pain, no red eyes, no eyesight problems, no discharge, no dry eyes, no itching of eyes  ENT: no complaints of earache, no loss of hearing, no nose bleeds, no nasal discharge, no sore throat, no hoarseness  Cardiovascular: No complaints of slow heart rate, no fast heart rate, no chest pain, no palpitations, no leg claudication, no lower extremity edema  Respiratory: No complaints of shortness of breath, no wheezing, no cough, no SOB on exertion, no orthopnea, no PND  Gastrointestinal: No complaints of abdominal pain, no constipation, no nausea or vomiting, no diarrhea, no bloody stools  Genitourinary: No complaints of dysuria, no incontinence, no pelvic pain, no dysmenorrhea, no vaginal discharge or bleeding  Musculoskeletal: No complaints of arthralgias, no myalgias, no joint swelling or stiffness, no limb pain or swelling  Integumentary: No complaints of skin rash or lesions, no itching, no skin wounds, no breast pain or lump  Neurological: No complaints of headache, no confusion, no convulsions, no numbness, no dizziness or fainting, no tingling, no limb weakness, no difficulty walking  Psychiatric: Not suicidal, no sleep disturbance, no anxiety or depression, no change in personality, no emotional problems     Endocrine: No complaints of proptosis, no hot flashes, no muscle weakness, no deepening of the voice, no feelings of weakness  Hematologic/Lymphatic: No complaints of swollen glands, no swollen glands in the neck, does not bleed easily, does not bruise easily  ROS Reviewed:   ROS reviewed  Active Problems    1  Allergic rhinitis due to pollen (477 0) (J30 1)   2  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)   3  Chronic atrial fibrillation (427 31) (I48 2)   4  Chronic kidney disease, stage 3 (585 3) (N18 3)   5  Cystocele, midline (618 01) (N81 11)   6  Encounter for Holly catheter removal (V53 6) (Z46 6)   7  Encounter for screening mammogram for breast cancer (V76 12) (Z12 31)   8  Fatigue (780 79) (R53 83)   9  GERD without esophagitis (530 81) (K21 9)   10  Hallux abducto valgus, bilateral (735 0) (M20 11,M20 12)   11  History of atrial fibrillation (V12 59) (Z86 79)   12  Hyperlipidemia (272 4) (E78 5)   13  Hypertension (401 9) (I10)   14  Lateral epicondylitis, right elbow (726 32) (M77 11)   15  Left atrial enlargement (429 3) (I51 7)   16  Left renal artery stenosis (440 1) (I70 1)   17  Lipoma of right upper extremity (214 8) (D17 21)   18  Low back pain (724 2) (M54 5)   19  Lump or mass in breast (611 72) (N63 0)   20  Medicare annual wellness visit, initial (V70 0) (Z00 00)   21  Nausea with vomiting (787 01) (R11 2)   22  Need for prophylactic vaccination and inoculation against influenza (V04 81) (Z23)   23  Need for vaccination with 13-polyvalent pneumococcal conjugate vaccine (V03 82) (Z23)   24  Onychomycosis of toenail (110 1) (B35 1)   25  Osteoarthritis of hip (715 95) (M16 9)   26  Osteoarthrosis, 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)   27  Osteopenia (733 90) (M85 80)   28  Pain due to onychomycosis of toenails of both feet (110 1,729 5)    (B35 1,M79 675,M79 674)   29  Pernicious anemia (281 0) (D51 0)   30  Right knee pain (719 46) (M25 561)   31  Seasonal affective disorder (296 99) (F33 9)   32  Shortness of breath (786 05) (R06 02)   33   Skin pustule (686  9) (L08 9)   34  Tubulovillous adenoma (229 9) (D36 9)   35  Vaginal wall prolapse (618 00) (N81 10)   36  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)   37  Vitamin B12 deficiency (266 2) (E53 8)    Past Medical History    1  History of Breast pain, right (611 71) (N64 4)   2  History of Chest pain on breathing (786 52) (R07 1)   3  History of Encounter for screening colonoscopy (V76 51) (Z12 11)   4  H/O sick sinus syndrome (V12 59) (Z86 79)   5  History of atrial fibrillation (V12 59) (Z86 79)   6  History of cough   7  History of Preop examination (V72 84) (Z01 818)   8  History of Viral bronchitis (466 0) (J20 8)   9  History of Weight loss (783 21) (R63 4)    Surgical History    1  History of Biopsy Breast Percutaneous Needle Core  Surgical History Reviewed: The surgical history was reviewed and updated today  Family History  Mother    1  Family history of diabetes mellitus (V18 0) (Z83 3)  Family History Reviewed: The family history was reviewed and updated today  Social History    · Denied: History of Alcohol use   · Denied: History of Drug use   · Housing, household, and economic circumstances (V60 9) (Z59 9)   · Never A Smoker  Social History Reviewed: The social history was reviewed and updated today  Current Meds   1  Benicar 40 MG Oral Tablet; TAKE 1 TABLET DAILY; Therapy: 81Gcp3572 to (Last Rx:02Fzb2023)  Requested for: 15UXT6277 Ordered   2  Benzonatate 100 MG Oral Capsule; TAKE 1 CAPSULE 3 TIMES DAILY AS NEEDED; Therapy: 61JIM3779 to (Evaluate:84Fiv0380)  Requested for: 31RHI0988; Last   Rx:41Olj2570 Ordered   3  Bystolic 5 MG Oral Tablet; Therapy: (Recorded:45Olh2793) to Recorded   4  Calcium 500 +D 500-400 MG-UNIT Oral Tablet; Therapy: (CGPEQVON:06TCO0678) to Recorded   5  Ciclopirox 8 % External Solution; APPLY AND GENTLY MASSAGE INTO AFFECTED   AREA(S) TWICE DAILY; Therapy: 71GMU8834 to (Last Rx:70Tfi3286) Ordered   6   Cyanocobalamin 1000 MCG/ML Injection Solution; INJECT 1 ML INTRAMUSCULARLY   ONCE A MONTH  Requested for: 33Rso9346; Last Rx:33Wbm4908 Ordered   7  Fluticasone Propionate 50 MCG/ACT Nasal Suspension; instill 2 sprays into each nostril   once daily; Therapy: 07GLW5028 to (Evaluate:66Iay3125)  Requested for: 50Lma2183; Last   Rx:38Smn0484 Ordered   8  Indapamide 2 5 MG Oral Tablet; TAKE 1 TABLET DAILY; Therapy: 42IWY0993 to (Evaluate:37Rax5016); Last Rx:58Ony3531 Ordered   9  Magnesium Oxide 400 MG Oral Capsule; Take 2 daily; Therapy: 61WQL1810 to (Last Rx:18Feb2016)  Requested for: 80XWS1752 Ordered   10  Magnesium Oxide 400 MG Oral Capsule; TAKE AS DIRECTED; Therapy: 84TVE7429 to (Last Rx:02Hsa2562) Ordered   11  Pantoprazole Sodium 40 MG Oral Tablet Delayed Release; TAKE 1 TABLET BY MOUTH    ONCE DAILY; Therapy: 70JCC2785 to (Evaluate:31Lso7575)  Requested for: 92BBD4585; Last    Rx:81Pnj4160 Ordered   12  Premarin 0 625 MG/GM Vaginal Cream; Aplique un poco de crema en el dedo y eche en    la vagina todas las noches por 2 semanas y despues wilver noche si y [place] no  No use    aplicador; Therapy: 84YCP7718 to (Last Rx:69Oqa4007) Ordered   13  Spironolactone 25 MG Oral Tablet; Therapy: 39Ljr2589 to (Last Khloecruz Woodruff)  Requested for: 81Spq9904 Ordered   14  Verapamil HCl - 120 MG Oral Tablet; 1 tab po qod; Therapy: 92LNP4113 to (Last Rx:73Zhu0913) Ordered   15  Xarelto 15 MG Oral Tablet; Take 1 tablet daily; Therapy: 09DHJ2756 to (Charo Oas)  Requested for: 60VDW3952; Last    Rx:16Ybx3796 Ordered   16  ZyrTEC Allergy 10 MG Oral Capsule; take 1 capsule daily; Therapy: 98OMS0807 to (Evaluate:14Oct2017)  Requested for: 09CLE5447; Last    Rx:03Mns1773 Ordered  Medication List Reviewed: The medication list was reviewed and updated today  Allergies    1  No Known Drug Allergies    2  Adhesive Tape   3   No Known Environmental Allergies    Vitals  Signs   Recorded: 33Cxu6331 12:55PM   Temperature: 97 5 F  Heart Rate: 92  Systolic: 822  Diastolic: 74  Height: 5 ft 2 in  Weight: 134 lb 14 74 oz  BMI Calculated: 24 68  BSA Calculated: 1 62    Physical Exam    Constitutional   General appearance: No acute distress, well appearing and well nourished  Ears, Nose, Mouth, and Throat   Oropharynx: Normal with no erythema, edema, exudate or lesions  Pulmonary   Auscultation of lungs: Clear to auscultation  Cardiovascular   Auscultation of heart: Normal rate and rhythm, normal S1 and S2, without murmurs  Abdomen   Abdomen: Non-tender, no masses  Lymphatic   Palpation of lymph nodes in neck: No lymphadenopathy  Musculoskeletal   Inspection/palpation of joints, bones, and muscles: Normal     Neurologic   Sensation: No sensory loss  Psychiatric   Orientation to person, place, and time: Normal     Mood and affect: Normal          Attending Note  Attending Note: Attending Note: I interviewed and examined the patient, I discussed the case with the Resident and reviewed the Resident's note, I supervised the Resident and I agree with the Resident management plan as it was presented to me  Level of Participation: I was present in clinic and examined the patient  Patient's History: The patient has been feeling well since your recent discharge from the hospital  No further chest pain or GI symptoms  Key Parts of the Exam: Pleasant, in NAD Heart reg rhythm  Lungs clear  Diagnosis and Plan: Agree with A&P  Comments/Additional Findings: Care plan reviewed with the patient and her daughter in law        Future Appointments    Date/Time Provider Specialty Site   2018 11:15 AM Triston Gabriel MD Cardiology [address]   2017 10:00 AM [place], PCP Nurse Schedule  [address] [place] [address] PCP     Signatures   Electronically signed by : KARLI Harrison ; Dec 14 2017  1:42PM EST                       (Author)    Electronically signed by : KARLI Nicole ; Dec 14 2017  1:51PM EST (Co-participant)

## 2018-01-23 NOTE — RESULT NOTES
Verified Results  * MAMMO SCREENING BILATERAL W CAD 68AXT9938 08:57AM Makenna Harris Order Number: [id number]    - Patient Instructions: To schedule this appointment, please contact Central Scheduling at [phone]  Do not wear any perfume, powder, lotion or deodorant on breast or underarm area  Please bring your doctors order, referral (if needed) and insurance information with you on the day of the test  Failure to bring this information may result in this test being rescheduled  Arrive 15 minutes prior to your appointment time to register  On the day of your test, please bring any prior mammogram or breast studies with you that were not performed at a [facility]  Failure to bring prior exams may result in your test needing to be rescheduled  Test Name Result Flag Reference   MAMMO SCREENING BILATERAL W CAD (Report)     Patient History:   Patient is postmenopausal    Family history of breast cancer at age 48 in sister  Benign WB stereo breast biopsy of the right breast, October 20, 2014  Pathology Report of both breasts, October 20, 2014  Benign ultrasound-guided core biopsy of the right breast, August 17, 2006  No Hormone Replacement Therapy   Patient has never smoked  Patient's BMI is 24 1  Reason for exam: screening, asymptomatic  Mammo Screening Bilateral W CAD: December 6, 2017 - Check In #:    [de-identified]   Bilateral MLO and CC view(s) were taken  Technologist: RT Reyna(R)(M)   Prior study comparison: December 5, 2016, mammo diagnostic    bilateral W CAD, performed at [place] [address]  December 2, 2015, bilateral digital screening mammogram    performed at [address] [place] [address]      There are scattered fibroglandular densities  The parenchymal pattern appears stable  No dominant soft tissue    mass or suspicious calcifications are noted   A stable nodular    asymmetry is seen in the inferior anterior left breast  Radio opaque post core biopsy clips are seen in the right breast     Vascular calcification is present  A pacemaker is superimposed    over the right pectoralis muscle  The skin and nipple contours    are within normal limits  No mammographic evidence of malignancy  No    significant changes when compared with prior studies  ACR BI-RADSï¾® Assessments: BiRad:2 - Benign     Recommendation:   Routine screening mammogram in 1 year  Analyzed by CAD     The patient is scheduled in a reminder system for screening    mammography  8-10% of cancers will be missed on mammography  Management of a    palpable abnormality must be based on clinical grounds  Patients   will be notified of their results via letter from our facility  Accredited by [employer] of Radiology and FDA       Transcription Location: [facility] [address]: [id number]     Risk Value(s):   Tyrer-Cuzick 10 Year: 5 300%, Tyrer-Cuzick Lifetime: 5 300%,    Myriad Table: 1 5%, JOSEP 5 Year: 4 9%, NCI Lifetime: 9 1%   Signed by:   Sarah Mae MD   12/6/17

## 2018-01-26 ENCOUNTER — TELEPHONE (OUTPATIENT)
Dept: INTERNAL MEDICINE CLINIC | Facility: CLINIC | Age: 78
End: 2018-01-26

## 2018-01-26 ENCOUNTER — CLINICAL SUPPORT (OUTPATIENT)
Dept: INTERNAL MEDICINE CLINIC | Facility: CLINIC | Age: 78
End: 2018-01-26
Payer: COMMERCIAL

## 2018-01-26 DIAGNOSIS — E53.8 VITAMIN B12 DEFICIENCY: Primary | ICD-10-CM

## 2018-01-26 PROCEDURE — 96372 THER/PROPH/DIAG INJ SC/IM: CPT | Performed by: INTERNAL MEDICINE

## 2018-01-26 RX ORDER — CYANOCOBALAMIN 1000 UG/ML
1000 INJECTION INTRAMUSCULAR; SUBCUTANEOUS
Status: CANCELLED | OUTPATIENT
Start: 2018-01-26

## 2018-01-26 NOTE — PROGRESS NOTES
Pt  CAME INTO THE OFFICE TODAY FOR A NURSE VISIT FOR A JOANA Meyer ON 5/17/17  Pt  BROUGHT HER OWN SERUM AND 1 ML WAS INJECTED INTO RIGHT DELTOID  UNABLE TO DOCUMENT ALL VACCINE INFO  INTO EPIC CORRECTLY DUE TO ORDER NOT SIGNED       CYANOCOBALAMIN 1000 MCG/ML  LOT # -  [id number]  EXP - 9/18    [address] # - [phone]  [facility]

## 2018-01-26 NOTE — TELEPHONE ENCOUNTER
I PUT AN ORDER IN FOR THIS PATIENTS VIT  B12 INJECTION THAT SHE GETS, I CANNOT DOCUMENT IN IT UNTIL THE ORDER IS SIGNED OFF On  CAN YOU DO THAT TODAY AS SHE HAS AN APPT  TODAY  ALSO IT NEEDS TO SIGNED AS A REOCCURING ORDER

## 2018-01-26 NOTE — TELEPHONE ENCOUNTER
I SENT YOU A MESSAGE TO SIGN OFF ON HER ORDER FOR VIT B12 BUT I THINK YOU REFILLED IT AND SENT IT TO THE PHARMACY  CAN YOU PLEASE PLACE A NEW ORDER AND IT NEEDS TO BE ORDERED AS A CLINIC ADMINISTRATION   THANKS

## 2018-01-29 ENCOUNTER — TELEPHONE (OUTPATIENT)
Dept: OTHER | Facility: HOSPITAL | Age: 78
End: 2018-01-29

## 2018-01-29 DIAGNOSIS — D51.0 PERNICIOUS ANEMIA: Primary | Chronic | ICD-10-CM

## 2018-01-29 RX ORDER — CYANOCOBALAMIN 1000 UG/ML
1000 INJECTION INTRAMUSCULAR; SUBCUTANEOUS
Status: DISCONTINUED | OUTPATIENT
Start: 2018-01-29 | End: 2018-04-16

## 2018-02-09 ENCOUNTER — OFFICE VISIT (OUTPATIENT)
Dept: GASTROENTEROLOGY | Facility: CLINIC | Age: 78
End: 2018-02-09
Payer: COMMERCIAL

## 2018-02-09 VITALS
RESPIRATION RATE: 14 BRPM | TEMPERATURE: 97.4 F | DIASTOLIC BLOOD PRESSURE: 74 MMHG | WEIGHT: 134 LBS | BODY MASS INDEX: 24.51 KG/M2 | HEART RATE: 72 BPM | SYSTOLIC BLOOD PRESSURE: 152 MMHG

## 2018-02-09 DIAGNOSIS — K21.9 GASTROESOPHAGEAL REFLUX DISEASE WITHOUT ESOPHAGITIS: ICD-10-CM

## 2018-02-09 DIAGNOSIS — D36.9 TUBULAR ADENOMA: Primary | ICD-10-CM

## 2018-02-09 PROCEDURE — 99214 OFFICE O/P EST MOD 30 MIN: CPT | Performed by: INTERNAL MEDICINE

## 2018-02-09 RX ORDER — VERAPAMIL HYDROCHLORIDE 120 MG/1
1 TABLET, FILM COATED ORAL
COMMUNITY
Start: 2016-02-18 | End: 2018-02-21 | Stop reason: ALTCHOICE

## 2018-02-09 NOTE — LETTER
February 12, 2018     Alfa Mcintyre, [address] [facility]    Patient: William Winston   YOB: 1940   Date of Visit: 2/9/2018       Dear Dr Salena Combs: Thank you for referring Andry Bourne to me for evaluation  Below are my notes for this consultation  If you have questions, please do not hesitate to call me  I look forward to following your patient along with you  Sincerely,        Gaby Lawrence DO        CC: No Recipients  Gaby Lawrence DO  2/9/2018 12:45 PM  Sign at close encounter  [facility] - Outpatient Follow-up Note  William Winston 68 y o  female MRN: [id number]  Encounter: [id number]          ASSESSMENT AND PLAN:      1  Tubular adenoma    - Case request operating room: EGD AND COLONOSCOPY; Standing  - Case request operating room: EGD AND COLONOSCOPY    2  Gastroesophageal reflux disease without esophagitis    - Case request operating room: EGD AND COLONOSCOPY; Standing  - Case request operating room: EGD AND COLONOSCOPY    ______________________________________________________________________    SUBJECTIVE:  68year old female here for follow up  She has A fib and is on Xarelto  Recently admitted in January of this year  REVIEW OF SYSTEMS IS OTHERWISE NEGATIVE  Historical Information   Past Medical History:   Diagnosis Date    A-fib ([address] )     Anemia     Arthritis     Breast pain, right     Chest pain on breathing     Cough     Encounter for screening colonoscopy     H/O sick sinus syndrome     Heart murmur     High cholesterol     History of atrial fibrillation     Rate ontrolled  On xarelto 15mg (creatinine 50) Followed by Dr Shannon Silva with Cardiology     History of weight loss     Report loose fitting clothes  Weight stable (3lbs difference)  Last colo showed small tubular adenoma x2  Breast biopsy last showed fibrocystic changes  TSH wnl  Will check cbc, bmp, spep          Hx of long term use of blood thinners     Hypertension     Irregular heart beat     Pacemaker     Preop examination     Shortness of breath     Viral bronchitis      Past Surgical History:   Procedure Laterality Date    BREAST BIOPSY      Percutaneous Needle Core     CARDIAC PACEMAKER PLACEMENT      1996 and april 2009    CARDIAC PACEMAKER PLACEMENT      CATARACT EXTRACTION W/ INTRAOCULAR LENS  IMPLANT, BILATERAL      COLONOSCOPY      LEG SURGERY Right     as a child after a fall    NV CMBND ANTERPOST COLPORRAPHY W/CYSTO N/A 3/17/2016    Procedure: COLPORRHAPHY ANTERIOR POSTERIOR ;  Surgeon: Justin Overton MD;  [facility];  Service: Gynecology    NV CYSTOURETHROSCOPY N/A 3/17/2016    Procedure: Kathrene Liban;  Surgeon: Justin Overton MD;  [facility];  Service: Gynecology    NV [place] LIG N/A 3/17/2016    Procedure: COLPOPEXY VAGINAL EXTRAPERITONEAL (VEC) ANTERIOR ;  Surgeon: Justin Overton MD;  [facility];  Service: Gynecology    NV SLING OPER STRES INCONTINENCE N/A 3/17/2016    Procedure: INSERTION PUBOVAGINAL SLING SINGLE INCISION ;  Surgeon: Justin Overton MD;  [facility];  Service: Gynecology     Social History   History   Alcohol Use No     History   Drug Use No     History   Smoking Status    Never Smoker   Smokeless Tobacco    Never Used     Family History   Problem Relation Age of Onset    Diabetes Mother        Meds/Allergies       Current Outpatient Prescriptions:     acetaminophen (TYLENOL) 325 mg tablet    Calcium Carb-Cholecalciferol (CALCIUM 500+D3) 500-400 MG-UNIT TABS    cetirizine (ZyrTEC) 10 mg tablet    conjugated estrogens (PREMARIN) vaginal cream    Cyanocobalamin 1000 MCG/ML KIT    fluticasone (FLONASE) 50 mcg/act nasal spray    indapamide (LOZOL) 2 5 mg tablet    magnesium oxide (MAG-OX) 400 mg    nebivolol (BYSTOLIC) 5 mg tablet    olmesartan (BENICAR) 40 mg tablet    pantoprazole (PROTONIX) 40 mg tablet    rivaroxaban (XARELTO) tablet    spironolactone (ALDACTONE) 25 mg tablet    verapamil (CALAN) 120 mg tablet    verapamil (CALAN) 120 mg tablet    Current Facility-Administered Medications:     cyanocobalamin injection 1,000 mcg, 1,000 mcg, Intramuscular, Q30 Days    Allergies   Allergen Reactions    Other Itching     Bandaids, tape    redness           Objective     Blood pressure 152/74, pulse 72, temperature (!) 97 4 °F (36 3 °C), temperature source Tympanic, resp  rate 14, weight 60 8 kg (134 lb)  PHYSICAL EXAM:      General Appearance:   Alert, cooperative, no distress   HEENT:   Normocephalic, atraumatic, anicteric      Neck:  Supple, symmetrical, trachea midline   Lungs:   Clear to auscultation bilaterally; no rales, rhonchi or wheezing; respirations unlabored    Heart[de-identified]   Regular rate and rhythm; no murmur, rub, or gallop  Abdomen:   Soft, non-tender, non-distended; normal bowel sounds; no masses, no organomegaly    Genitalia:   Deferred    Rectal:   Deferred    Extremities:  No cyanosis, clubbing or edema    Pulses:  2+ and symmetric    Skin:  No jaundice, rashes, or lesions    Lymph nodes:  No palpable cervical lymphadenopathy        Lab Results:   No visits with results within 1 Day(s) from this visit  Latest known visit with results is:   Appointment on 01/15/2018   Component Date Value    Ova + Parasite Exam 01/15/2018 No ova, cysts, or parasites seen     One negative specimen does not rule out the possibility of a  parasitic infection       Salmonella sp PCR 01/15/2018 None Detected     Shigella sp/Enteroinvasi* 01/15/2018 None Detected     Campylobacter sp (jejuni* 01/15/2018 None Detected     Shiga toxin 1/Shiga lorena* 01/15/2018 None Detected

## 2018-02-09 NOTE — PROGRESS NOTES
[facility] - Outpatient Follow-up Note  Keren Vernon 68 y o  female MRN: [id number]  Encounter: [id number]          ASSESSMENT AND PLAN:      1  Tubular adenoma  - needs to hold her xarelto for 2 days prior    - Case request operating room: EGD AND COLONOSCOPY; Standing  - Case request operating room: EGD AND COLONOSCOPY    2  Gastroesophageal reflux disease without esophagitis    - Case request operating room: EGD AND COLONOSCOPY; Standing  - Case request operating room: EGD AND COLONOSCOPY    ______________________________________________________________________    SUBJECTIVE:  68year old female here for follow up  She has A fib and is on Xarelto  Recently admitted in January of this year  REVIEW OF SYSTEMS IS OTHERWISE NEGATIVE  Historical Information   Past Medical History:   Diagnosis Date    [facility]     Anemia     Arthritis     Breast pain, right     Chest pain on breathing     Cough     Encounter for screening colonoscopy     H/O sick sinus syndrome     Heart murmur     High cholesterol     History of atrial fibrillation     Rate ontrolled  On xarelto 15mg (creatinine 50) Followed by Dr Nessa Gregg with Cardiology     History of weight loss     Report loose fitting clothes  Weight stable (3lbs difference)  Last colo showed small tubular adenoma x2  Breast biopsy last showed fibrocystic changes  TSH wnl  Will check cbc, bmp, spep          Hx of long term use of blood thinners     Hypertension     Irregular heart beat     Pacemaker     Preop examination     Shortness of breath     Viral bronchitis      Past Surgical History:   Procedure Laterality Date    BREAST BIOPSY      Percutaneous Needle Core     CARDIAC PACEMAKER PLACEMENT      1996 and april 2009    CARDIAC PACEMAKER PLACEMENT      CATARACT EXTRACTION W/ INTRAOCULAR LENS  IMPLANT, BILATERAL      COLONOSCOPY      LEG SURGERY Right     as a child after a fall    LA CMBND ANTERPOST COLPORRAPHY W/CYSTO N/A 3/17/2016    Procedure: COLPORRHAPHY ANTERIOR POSTERIOR ;  Surgeon: Pal Doan MD;  [facility];  Service: Gynecology    MA CYSTOURETHROSCOPY N/A 3/17/2016    Procedure: Tato Echols;  Surgeon: Pal Doan MD;  [facility];  Service: Gynecology    MA REVAGINAL PROLAPSE,SACROSP LIG N/A 3/17/2016    Procedure: COLPOPEXY VAGINAL EXTRAPERITONEAL (VEC) ANTERIOR ;  Surgeon: Pal Doan MD;  [facility];  Service: Gynecology    MA SLING OPER STRES INCONTINENCE N/A 3/17/2016    Procedure: INSERTION PUBOVAGINAL SLING SINGLE INCISION ;  Surgeon: Pal Doan MD;  [facility];  Service: Gynecology     Social History   History   Alcohol Use No     History   Drug Use No     History   Smoking Status    Never Smoker   Smokeless Tobacco    Never Used     Family History   Problem Relation Age of Onset    Diabetes Mother        Meds/Allergies       Current Outpatient Prescriptions:     acetaminophen (TYLENOL) 325 mg tablet    Calcium Carb-Cholecalciferol (CALCIUM 500+D3) 500-400 MG-UNIT TABS    cetirizine (ZyrTEC) 10 mg tablet    conjugated estrogens (PREMARIN) vaginal cream    Cyanocobalamin 1000 MCG/ML KIT    fluticasone (FLONASE) 50 mcg/act nasal spray    indapamide (LOZOL) 2 5 mg tablet    magnesium oxide (MAG-OX) 400 mg    nebivolol (BYSTOLIC) 5 mg tablet    olmesartan (BENICAR) 40 mg tablet    pantoprazole (PROTONIX) 40 mg tablet    rivaroxaban (XARELTO) tablet    spironolactone (ALDACTONE) 25 mg tablet    verapamil (CALAN) 120 mg tablet    verapamil (CALAN) 120 mg tablet    Current Facility-Administered Medications:     cyanocobalamin injection 1,000 mcg, 1,000 mcg, Intramuscular, Q30 Days    Allergies   Allergen Reactions    Other Itching     Bandaids, tape    redness           Objective     Blood pressure 152/74, pulse 72, temperature (!) 97 4 °F (36 3 °C), temperature source Tympanic, resp   rate 14, weight 60 8 kg (134 lb)  PHYSICAL EXAM:      General Appearance:   Alert, cooperative, no distress   HEENT:   Normocephalic, atraumatic, anicteric      Neck:  Supple, symmetrical, trachea midline   Lungs:   Clear to auscultation bilaterally; no rales, rhonchi or wheezing; respirations unlabored    Heart[de-identified]   Regular rate and rhythm; no murmur, rub, or gallop  Abdomen:   Soft, non-tender, non-distended; normal bowel sounds; no masses, no organomegaly    Genitalia:   Deferred    Rectal:   Deferred    Extremities:  No cyanosis, clubbing or edema    Pulses:  2+ and symmetric    Skin:  No jaundice, rashes, or lesions    Lymph nodes:  No palpable cervical lymphadenopathy        Lab Results:   No visits with results within 1 Day(s) from this visit  Latest known visit with results is:   Appointment on 01/15/2018   Component Date Value    Ova + Parasite Exam 01/15/2018 No ova, cysts, or parasites seen     One negative specimen does not rule out the possibility of a  parasitic infection       Salmonella sp PCR 01/15/2018 None Detected     Shigella sp/Enteroinvasi* 01/15/2018 None Detected     Campylobacter sp (jejuni* 01/15/2018 None Detected     Shiga toxin 1/Shiga lorena* 01/15/2018 None Detected

## 2018-02-12 ENCOUNTER — TELEPHONE (OUTPATIENT)
Dept: GASTROENTEROLOGY | Facility: CLINIC | Age: 78
End: 2018-02-12

## 2018-02-12 NOTE — TELEPHONE ENCOUNTER
DR HILLMAN'S PT    Pt called stating her prep is not covered by her insurance  Pt need other options  Thank you   Tongan speaker

## 2018-02-13 ENCOUNTER — TELEPHONE (OUTPATIENT)
Dept: LAB | Facility: CLINIC | Age: 78
End: 2018-02-13

## 2018-02-20 NOTE — TELEPHONE ENCOUNTER
SPOKE TO PT I HAVE SUPREP SAMPLE IN THE [place] WITH SUPREP INSTRUCTIONS IN [place] SHE WILL COME BY AND  PREP

## 2018-02-21 ENCOUNTER — OFFICE VISIT (OUTPATIENT)
Dept: CARDIOLOGY CLINIC | Facility: CLINIC | Age: 78
End: 2018-02-21
Payer: COMMERCIAL

## 2018-02-21 VITALS
WEIGHT: 130.4 LBS | HEIGHT: 62 IN | HEART RATE: 66 BPM | BODY MASS INDEX: 24 KG/M2 | SYSTOLIC BLOOD PRESSURE: 140 MMHG | DIASTOLIC BLOOD PRESSURE: 60 MMHG

## 2018-02-21 DIAGNOSIS — I48.91 ATRIAL FIBRILLATION (HCC): Chronic | ICD-10-CM

## 2018-02-21 DIAGNOSIS — I10 HYPERTENSION, UNSPECIFIED TYPE: ICD-10-CM

## 2018-02-21 DIAGNOSIS — Z12.11 SCREENING FOR COLON CANCER: ICD-10-CM

## 2018-02-21 DIAGNOSIS — Z45.010 PACEMAKER BATTERY DEPLETION: Primary | ICD-10-CM

## 2018-02-21 PROCEDURE — 99214 OFFICE O/P EST MOD 30 MIN: CPT | Performed by: INTERNAL MEDICINE

## 2018-02-21 PROCEDURE — 93000 ELECTROCARDIOGRAM COMPLETE: CPT | Performed by: INTERNAL MEDICINE

## 2018-02-21 RX ORDER — VERAPAMIL HYDROCHLORIDE 120 MG/1
120 TABLET, FILM COATED ORAL 2 TIMES DAILY
Qty: 180 TABLET | Refills: 3 | Status: SHIPPED | OUTPATIENT
Start: 2018-02-21 | End: 2019-01-04 | Stop reason: ALTCHOICE

## 2018-02-21 RX ORDER — NEBIVOLOL 5 MG/1
5 TABLET ORAL DAILY
Qty: 90 TABLET | Refills: 3 | Status: SHIPPED | OUTPATIENT
Start: 2018-02-21 | End: 2019-03-26 | Stop reason: SDUPTHER

## 2018-02-21 RX ORDER — OLMESARTAN MEDOXOMIL 40 MG/1
40 TABLET ORAL
Qty: 90 TABLET | Refills: 3 | Status: SHIPPED | OUTPATIENT
Start: 2018-02-21 | End: 2018-09-05

## 2018-02-21 RX ORDER — SPIRONOLACTONE 25 MG/1
25 TABLET ORAL DAILY
Qty: 90 TABLET | Refills: 3 | Status: SHIPPED | OUTPATIENT
Start: 2018-02-21 | End: 2019-05-02 | Stop reason: SDUPTHER

## 2018-02-21 RX ORDER — INDAPAMIDE 2.5 MG/1
2.5 TABLET, FILM COATED ORAL EVERY MORNING
Qty: 90 TABLET | Refills: 3 | Status: SHIPPED | OUTPATIENT
Start: 2018-02-21 | End: 2019-05-02 | Stop reason: SDUPTHER

## 2018-02-21 NOTE — PROGRESS NOTES
HEART AND VASCULAR  CARDIAC [address]    New Consult  Today's Date: 02/21/18        Patient name: Godwin Cohn  YOB: 1940  Sex: female         Chief Complaint:  Afib, pacemaker battery depletion      ASSESSMENT:  Problem List Items Addressed This Visit     None      Visit Diagnoses     Pacemaker battery depletion    -  Primary    Relevant Orders    POCT ECG    Hypertension, unspecified type            69 yo female  1  Permanent afib rates controlled    2  tachy tasha w 6 yo Med[employer]onic single chamber pacemaker at [facility] mid January  Feels tired correlating w that  Rate is fixed at 65bpm while at [facility]  V paces approx 70% (I checked interogation myself today) Leads work ok  She had h/o left sided infection w/ erosion of pacemaker  3  EF normal and stress normal Kumar Nuclear stress    4  HTN reasonable control    5  Resent admissions for N/V unclear etiology    6  CKD CR 1 0 on last BMP    DISCUSSION AND PLAN:    1  Recommend right sided pacemaker gen change  Hold xarelto 3 days prior  2  Get last TTE report from Dr Kaitlyn Wu office      Informed Consent: Risks, benefits, and alternatives to Implantable cardiac device surgery was  discussed with patient and any family present  The patient and/or the patients designated decision maker understands risks, which include but are not limited to life threatening  bleeding, infection, air around lungs, blood around the heart, stroke, open heart surgery, death and reoperation dislodged or malfunctioning device  Reoperation due to device dislodgment is a fairly common complication where more serious complications are rare  Went over special risks of pacemaker infection given her h/o one         Orders Placed This Encounter   Procedures    POCT ECG     Medications Discontinued During This Encounter   Medication Reason    fluticasone (FLONASE) 50 mcg/act nasal spray Therapy completed    verapamil (CALAN) 120 mg tablet Therapy completed       Follow up in: 6 months        HPI:   69 yo female  1  Permanent afib rates controlled    2  tachy tasha w 4 yo Medtronic single chamber pacemaker at [facility] mid January  Feels tired correlating w that  Rate is fixed at 65bpm while at [facility]  V paces approx 70% (I checked interogation myself today) Leads work ok  She had h/o left sided infection w/ erosion of pacemaker  3  EF normal and stress normal Kumar Nuclear stress    4  HTN reasonable control    5  Resent admissions for N/V unclear etiology    6  CKD CR 1 0 on last BMP    She has been seeing DR Bolanos Early for >20 years  Transfering care for convenience of proximity  Feels tired today but no other specific complaints      Please note HPI is listed by problem with with update following it, it is copied again in the assessment above and reflects medical decision making as well  Complete 12 point ROS reviewed and otherwise non pertinent or negative except as per HPI pertinent positives in Cardiovascular and Respiratory emphasized  Please see paper chart for outpatient clinic patients where the patient completed the 12 point ROS survey  Past Medical History:   Diagnosis Date    [facility] )     Anemia     Arthritis     Breast pain, right     Chest pain on breathing     Cough     Encounter for screening colonoscopy     H/O sick sinus syndrome     Heart murmur     High cholesterol     History of atrial fibrillation     Rate ontrolled  On xarelto 15mg (creatinine 50) Followed by Dr Crystal Carlisle with Cardiology     History of weight loss     Report loose fitting clothes  Weight stable (3lbs difference)  Last colo showed small tubular adenoma x2  Breast biopsy last showed fibrocystic changes  TSH wnl  Will check cbc, bmp, spep          Hx of long term use of blood thinners     Hypertension     Irregular heart beat     Pacemaker  Preop examination     Shortness of breath     Viral bronchitis        Allergies   Allergen Reactions    Other Itching     Bandaids, tape    redness     I reviewed the Home Medication list and Allergies in the chart  Scheduled Meds:  Current Outpatient Prescriptions   Medication Sig Dispense Refill    acetaminophen (TYLENOL) 325 mg tablet 650 mg every 4 hours as needed 30 tablet 0    Calcium Carb-Cholecalciferol (CALCIUM 500+D3) 500-400 MG-UNIT TABS Take by mouth      cetirizine (ZyrTEC) 10 mg tablet Take 10 mg by mouth daily      conjugated estrogens (PREMARIN) vaginal cream Insert into the vagina daily      Cyanocobalamin 1000 MCG/ML KIT Inject 1 mL as directed every 30 (thirty) days for 12 doses 1 mL 0    indapamide (LOZOL) 2 5 mg tablet Take 2 5 mg by mouth every morning   magnesium oxide (MAG-OX) 400 mg Take 800 mg by mouth daily        nebivolol (BYSTOLIC) 5 mg tablet Take 5 mg by mouth daily   olmesartan (BENICAR) 40 mg tablet Take 40 mg by mouth daily at bedtime   pantoprazole (PROTONIX) 40 mg tablet Take 40 mg by mouth daily at bedtime        rivaroxaban (XARELTO) tablet Take 15 mg by mouth daily        spironolactone (ALDACTONE) 25 mg tablet Take 25 mg by mouth daily   verapamil (CALAN) 120 mg tablet Take 120 mg by mouth every other day   Na Sulfate-K Sulfate-Mg Sulf (SUPREP BOWEL PREP KIT) 17 5-3 13-1 6 GM/180ML SOLN Take 1 Bottle by mouth once for 1 dose 1 Bottle 0     Current Facility-Administered Medications   Medication Dose Route Frequency Provider Last Rate Last Dose    cyanocobalamin injection 1,000 mcg  1,000 mcg Intramuscular Q30 Days Suresh Varghese DO         PRN Meds:         Family History   Problem Relation Age of Onset    Diabetes Mother        Social History     Social History    Marital status:      Spouse name: N/A    Number of children: N/A    Years of education: N/A     Occupational History    Not on file       Social History Main Topics    Smoking status: Never Smoker    Smokeless tobacco: Never Used    Alcohol use No    Drug use: No    Sexual activity: Not on file     Other Topics Concern    Not on file     Social History Narrative    Housing, household, and economic circumstances          OBJECTIVE:    /60 (BP Location: Right arm, Patient Position: Sitting, Cuff Size: Standard)   Pulse 66   Ht 5' 2" (1 575 m)   Wt 59 1 kg (130 lb 6 4 oz)   BMI 23 85 kg/m²   Vitals:    02/21/18 1041   Weight: 59 1 kg (130 lb 6 4 oz)     GEN: No acute distress, Alert and oriented, well appearing  HEENT:Head, neck, ears, oral pharynx: Mucus membranes moist, oral pharynx clear, nares clear  External ears normal  EYES: Pupils equal, sclera anicteric, midline, normal conjuctiva  NECK: No JVD, supple, no obvious masses or thryomegaly or goiter  CARDIOVASCULAR:  RRR, 1/6 KAN, rub, gallops S1,S2  LUNGS: Clear To auscultation bilaterally, normal effort, no rales, rhonchi, crackles  ABDOMEN:  nondistended,  without obvious organomegaly or ascites  EXTREMITIES/VASCULAR:  No edema  Radial pulses intact, pedal pulses difficult to palpate, warm an well perfused  PSYCH: Normal Affect, no overt suicidal ideation, linear speech pattern without evidence of psychosis  NEURO: Grossly intact, moving all extremiteis equal, face symmetric, alert and responsive, no obvious focal defecits  GAIT:  Ambulates normally without difficulty  HEME: No bleeding, bruising, petechia, purpura  SKIN: No significant rashes, warm, no diaphoresis or pallor  Chest; Right sided pacemaker site well healed     Lab Results:     @RESULTRCNT(1M])@    CBC with diff:     CMP:    TSH:       Lipid Profile:          Cardiac testing:   No results found for this or any previous visit  No results found for this or any previous visit  No results found for this or any previous visit  No results found for this or any previous visit          I reviewed and interpreted the following LABS/EKG/TELE/IMAGING and below is summary of my interpretation (if data available):    LABS:CR 1 01    Current EKG and Rhythm Strip:Afib w V pacing    Past EKGs and RHYTHM strip: Jan EKG Afib w V pacing    I personally did cardiac device interrogation in office today and this was my finding of prior events: HETAL mid Jan, V pacing 65bpm, no events    CXR: right sided pacemaker single chamber

## 2018-02-26 ENCOUNTER — OFFICE VISIT (OUTPATIENT)
Dept: INTERNAL MEDICINE CLINIC | Facility: CLINIC | Age: 78
End: 2018-02-26
Payer: COMMERCIAL

## 2018-02-26 VITALS
WEIGHT: 130.95 LBS | SYSTOLIC BLOOD PRESSURE: 112 MMHG | BODY MASS INDEX: 24.1 KG/M2 | HEIGHT: 62 IN | TEMPERATURE: 97.9 F | HEART RATE: 80 BPM | DIASTOLIC BLOOD PRESSURE: 60 MMHG

## 2018-02-26 DIAGNOSIS — R11.2 INTRACTABLE VOMITING WITH NAUSEA, UNSPECIFIED VOMITING TYPE: Primary | ICD-10-CM

## 2018-02-26 DIAGNOSIS — Z95.0 PRESENCE OF CARDIAC PACEMAKER: ICD-10-CM

## 2018-02-26 DIAGNOSIS — E53.8 VITAMIN B12 DEFICIENCY: ICD-10-CM

## 2018-02-26 DIAGNOSIS — K21.9 GASTROESOPHAGEAL REFLUX DISEASE WITHOUT ESOPHAGITIS: Chronic | ICD-10-CM

## 2018-02-26 PROCEDURE — 96372 THER/PROPH/DIAG INJ SC/IM: CPT | Performed by: INTERNAL MEDICINE

## 2018-02-26 PROCEDURE — 99213 OFFICE O/P EST LOW 20 MIN: CPT | Performed by: INTERNAL MEDICINE

## 2018-02-26 PROCEDURE — 3725F SCREEN DEPRESSION PERFORMED: CPT | Performed by: INTERNAL MEDICINE

## 2018-02-26 RX ORDER — PANTOPRAZOLE SODIUM 40 MG/1
40 TABLET, DELAYED RELEASE ORAL
Qty: 90 TABLET | Refills: 1 | Status: SHIPPED | OUTPATIENT
Start: 2018-02-26 | End: 2018-04-30 | Stop reason: DRUGHIGH

## 2018-02-26 RX ADMIN — CYANOCOBALAMIN 1000 MCG: 1000 INJECTION INTRAMUSCULAR; SUBCUTANEOUS at 10:03

## 2018-02-26 NOTE — PATIENT INSTRUCTIONS
· Keep your appointment on 3/15/2018 for pacemaker battery change  · Keep appointment in April with Dr Anabel Hinson  · Follow up for annual physical exam or as needed

## 2018-02-26 NOTE — ASSESSMENT & PLAN NOTE
· Symptoms currently appeared to be resolved      · Advise gradually increasing intake of food as tolerated  · Keep appointment in April with GI for EGD and colonoscopy  · Refill of Protonix given

## 2018-02-26 NOTE — PROGRESS NOTES
Assessment/Plan:    Presence of cardiac pacemaker  · Saw Dr Chucho Bee and needs battery change; will have procedure on 3/15/2018  · Per Cardiology recommendations, hold Xarelto for 3 days prior to procedure    Intractable vomiting with nausea  · Symptoms currently appeared to be resolved  · Advise gradually increasing intake of food as tolerated  · Keep appointment in April with GI for EGD and colonoscopy  · Refill of Protonix given    Vitamin B12 deficiency  · Patient received her monthly B12 injection while in the office today       Subjective:      Patient ID: Viv Kamara is a 68 y o  female  Nausea / Vomiting  Patient here for follow up of nausea and vomiting  Onset of symptoms was several months ago  Patient describes nausea as improved so far  Vomiting has occurred 0 times over the past 1 month  Symptoms have been associated with diarrhea occurring intermittently, although patient denies this having since her hospital stay  Patient denies alcohol overuse, fever, hematemesis and melena  Symptoms have essentially resolved  Evaluation to date has been seen in ER:  Was admitted to the hospital and had largely negative workup  See lab results  She has a follow-up appoint with Dr Raquel Gerardo for an endoscopy and colonoscopy  She does however state that she has lost about 6 lb since last December due to this issue  Although her nausea and vomiting have largely resolved, she is apprehensive to eat more because of fear of exacerbating her problem  As result, she is not eating as much as she used to  She also reports that last couple weeks, she has had a couple episodes of transient double vision which lasted about few seconds at a time  This happened in the morning  However, she is not having any symptoms currently  Pacemaker  Patient also has a history of pacemaker and was recently seen in the outpatient setting by Dr Chucho Bee recommended her having a pacemaker battery change, for which she is scheduled to undergo on 3/15/2018  Currently does not have any chest pain or shortness of breath  She nuclear medicine stress test in January which was negative for ischemia or perfusion defects  EKG was unequivocal         Review of Systems   Constitutional: Positive for activity change, appetite change, fatigue and unexpected weight change  Negative for chills, diaphoresis and fever  Eyes: Positive for visual disturbance (Intermittent double vision)  Respiratory: Negative for chest tightness and shortness of breath  Cardiovascular: Negative for chest pain  Gastrointestinal: Negative for abdominal pain, blood in stool, constipation, diarrhea, nausea and vomiting  Objective:    /60 (BP Location: Left arm, Patient Position: Sitting, Cuff Size: Adult)   Pulse 80   Temp 97 9 °F (36 6 °C) (Oral)   Ht 5' 2" (1 575 m)   Wt 59 4 kg (130 lb 15 3 oz)   BMI 23 95 kg/m²    Physical Exam   Constitutional: She is oriented to person, place, and time  She appears well-developed and well-nourished  No distress  HENT:   Head: Normocephalic and atraumatic  Mouth/Throat: No oropharyngeal exudate  Eyes: Conjunctivae and EOM are normal  Pupils are equal, round, and reactive to light  Neck: Normal range of motion  Neck supple  No thyromegaly present  Cardiovascular: Normal rate, normal heart sounds, intact distal pulses and normal pulses  An irregularly irregular rhythm present  No murmur heard  Pulmonary/Chest: Effort normal and breath sounds normal    Abdominal: Soft  Bowel sounds are normal  She exhibits no distension  There is no tenderness  Musculoskeletal: Normal range of motion  She exhibits no edema or tenderness  Neurological: She is alert and oriented to person, place, and time  No cranial nerve deficit  Skin: Skin is warm and dry  She is not diaphoretic  Vitals reviewed

## 2018-02-26 NOTE — ASSESSMENT & PLAN NOTE
· Saw Dr Shannon Wells and needs battery change; will have procedure on 3/15/2018  · Per Cardiology recommendations, hold Xarelto for 3 days prior to procedure

## 2018-02-27 ENCOUNTER — TRANSCRIBE ORDERS (OUTPATIENT)
Dept: CARDIOLOGY CLINIC | Facility: CLINIC | Age: 78
End: 2018-02-27

## 2018-02-27 DIAGNOSIS — I48.21 PERMANENT ATRIAL FIBRILLATION (HCC): ICD-10-CM

## 2018-02-27 DIAGNOSIS — Z45.010 PACEMAKER AT END OF BATTERY LIFE: Primary | ICD-10-CM

## 2018-03-14 ENCOUNTER — TELEPHONE (OUTPATIENT)
Dept: INPATIENT UNIT | Facility: HOSPITAL | Age: 78
End: 2018-03-14

## 2018-03-15 ENCOUNTER — ANESTHESIA EVENT (OUTPATIENT)
Dept: SURGERY | Facility: HOSPITAL | Age: 78
End: 2018-03-15
Payer: COMMERCIAL

## 2018-03-15 ENCOUNTER — ANESTHESIA (OUTPATIENT)
Dept: SURGERY | Facility: HOSPITAL | Age: 78
End: 2018-03-15
Payer: COMMERCIAL

## 2018-03-15 ENCOUNTER — HOSPITAL ENCOUNTER (OUTPATIENT)
Dept: NON INVASIVE DIAGNOSTICS | Facility: HOSPITAL | Age: 78
Discharge: HOME | End: 2018-03-15
Attending: INTERNAL MEDICINE | Admitting: INTERNAL MEDICINE
Payer: COMMERCIAL

## 2018-03-15 VITALS
HEIGHT: 63 IN | OXYGEN SATURATION: 97 % | SYSTOLIC BLOOD PRESSURE: 127 MMHG | BODY MASS INDEX: 23.74 KG/M2 | RESPIRATION RATE: 16 BRPM | DIASTOLIC BLOOD PRESSURE: 60 MMHG | HEART RATE: 60 BPM | WEIGHT: 134 LBS | TEMPERATURE: 97.6 F

## 2018-03-15 DIAGNOSIS — Z45.010 PACEMAKER AT END OF BATTERY LIFE: ICD-10-CM

## 2018-03-15 DIAGNOSIS — I48.21 PERMANENT ATRIAL FIBRILLATION (HCC): ICD-10-CM

## 2018-03-15 LAB
ANION GAP SERPL CALCULATED.3IONS-SCNC: 5 MMOL/L (ref 4–13)
ATRIAL RATE: 45 BPM
BASOPHILS # BLD AUTO: 0.02 THOUSANDS/ΜL (ref 0–0.1)
BASOPHILS NFR BLD AUTO: 0 % (ref 0–1)
BUN SERPL-MCNC: 20 MG/DL (ref 5–25)
CALCIUM SERPL-MCNC: 9 MG/DL (ref 8.3–10.1)
CHLORIDE SERPL-SCNC: 106 MMOL/L (ref 100–108)
CO2 SERPL-SCNC: 26 MMOL/L (ref 21–32)
CREAT SERPL-MCNC: 1.14 MG/DL (ref 0.6–1.3)
EOSINOPHIL # BLD AUTO: 0.14 THOUSAND/ΜL (ref 0–0.61)
EOSINOPHIL NFR BLD AUTO: 2 % (ref 0–6)
ERYTHROCYTE [DISTWIDTH] IN BLOOD BY AUTOMATED COUNT: 13.4 % (ref 11.6–15.1)
GFR SERPL CREATININE-BSD FRML MDRD: 46 ML/MIN/1.73SQ M
GLUCOSE P FAST SERPL-MCNC: 84 MG/DL (ref 65–99)
GLUCOSE SERPL-MCNC: 84 MG/DL (ref 65–140)
HCT VFR BLD AUTO: 36.1 % (ref 34.8–46.1)
HGB BLD-MCNC: 11.9 G/DL (ref 11.5–15.4)
LYMPHOCYTES # BLD AUTO: 1.34 THOUSANDS/ΜL (ref 0.6–4.47)
LYMPHOCYTES NFR BLD AUTO: 17 % (ref 14–44)
MAGNESIUM SERPL-MCNC: 2.1 MG/DL (ref 1.6–2.6)
MCH RBC QN AUTO: 31.1 PG (ref 26.8–34.3)
MCHC RBC AUTO-ENTMCNC: 33 G/DL (ref 31.4–37.4)
MCV RBC AUTO: 94 FL (ref 82–98)
MONOCYTES # BLD AUTO: 0.71 THOUSAND/ΜL (ref 0.17–1.22)
MONOCYTES NFR BLD AUTO: 9 % (ref 4–12)
NEUTROPHILS # BLD AUTO: 5.48 THOUSANDS/ΜL (ref 1.85–7.62)
NEUTS SEG NFR BLD AUTO: 72 % (ref 43–75)
NRBC BLD AUTO-RTO: 0 /100 WBCS
PLATELET # BLD AUTO: 223 THOUSANDS/UL (ref 149–390)
PMV BLD AUTO: 11.1 FL (ref 8.9–12.7)
POTASSIUM SERPL-SCNC: 4.5 MMOL/L (ref 3.5–5.3)
QRS AXIS: -55 DEGREES
QRSD INTERVAL: 176 MS
QT INTERVAL: 478 MS
QTC INTERVAL: 497 MS
RBC # BLD AUTO: 3.83 MILLION/UL (ref 3.81–5.12)
SODIUM SERPL-SCNC: 137 MMOL/L (ref 136–145)
T WAVE AXIS: 82 DEGREES
VENTRICULAR RATE: 65 BPM
WBC # BLD AUTO: 7.72 THOUSAND/UL (ref 4.31–10.16)

## 2018-03-15 PROCEDURE — 85025 COMPLETE CBC W/AUTO DIFF WBC: CPT | Performed by: PHYSICIAN ASSISTANT

## 2018-03-15 PROCEDURE — 93010 ELECTROCARDIOGRAM REPORT: CPT | Performed by: INTERNAL MEDICINE

## 2018-03-15 PROCEDURE — 33227 REMOVE&REPLACE PM GEN SINGL: CPT | Performed by: INTERNAL MEDICINE

## 2018-03-15 PROCEDURE — 83735 ASSAY OF MAGNESIUM: CPT | Performed by: PHYSICIAN ASSISTANT

## 2018-03-15 PROCEDURE — C1786 PMKR, SINGLE, RATE-RESP: HCPCS

## 2018-03-15 PROCEDURE — 80048 BASIC METABOLIC PNL TOTAL CA: CPT | Performed by: PHYSICIAN ASSISTANT

## 2018-03-15 PROCEDURE — 93005 ELECTROCARDIOGRAM TRACING: CPT | Performed by: PHYSICIAN ASSISTANT

## 2018-03-15 RX ORDER — GENTAMICIN SULFATE 40 MG/ML
INJECTION, SOLUTION INTRAMUSCULAR; INTRAVENOUS CODE/TRAUMA/SEDATION MEDICATION
Status: COMPLETED | OUTPATIENT
Start: 2018-03-15 | End: 2018-03-15

## 2018-03-15 RX ORDER — PROPOFOL 10 MG/ML
INJECTION, EMULSION INTRAVENOUS AS NEEDED
Status: DISCONTINUED | OUTPATIENT
Start: 2018-03-15 | End: 2018-03-15 | Stop reason: SURG

## 2018-03-15 RX ORDER — LIDOCAINE HYDROCHLORIDE 10 MG/ML
INJECTION, SOLUTION INFILTRATION; PERINEURAL CODE/TRAUMA/SEDATION MEDICATION
Status: COMPLETED | OUTPATIENT
Start: 2018-03-15 | End: 2018-03-15

## 2018-03-15 RX ORDER — ACETAMINOPHEN 325 MG/1
650 TABLET ORAL EVERY 4 HOURS PRN
Status: DISCONTINUED | OUTPATIENT
Start: 2018-03-15 | End: 2018-03-15 | Stop reason: HOSPADM

## 2018-03-15 RX ORDER — PROPOFOL 10 MG/ML
INJECTION, EMULSION INTRAVENOUS CONTINUOUS PRN
Status: DISCONTINUED | OUTPATIENT
Start: 2018-03-15 | End: 2018-03-15 | Stop reason: SURG

## 2018-03-15 RX ORDER — SODIUM CHLORIDE 9 MG/ML
INJECTION, SOLUTION INTRAVENOUS CONTINUOUS PRN
Status: DISCONTINUED | OUTPATIENT
Start: 2018-03-15 | End: 2018-03-15 | Stop reason: SURG

## 2018-03-15 RX ADMIN — SODIUM CHLORIDE: 0.9 INJECTION, SOLUTION INTRAVENOUS at 12:34

## 2018-03-15 RX ADMIN — PROPOFOL 80 MCG/KG/MIN: 10 INJECTION, EMULSION INTRAVENOUS at 12:39

## 2018-03-15 RX ADMIN — GENTAMICIN SULFATE 80 MG: 40 INJECTION, SOLUTION INTRAMUSCULAR; INTRAVENOUS at 13:06

## 2018-03-15 RX ADMIN — PROPOFOL 30 MG: 10 INJECTION, EMULSION INTRAVENOUS at 12:39

## 2018-03-15 RX ADMIN — LIDOCAINE HYDROCHLORIDE 14 ML: 10 INJECTION, SOLUTION INFILTRATION; PERINEURAL at 13:01

## 2018-03-15 RX ADMIN — CEFAZOLIN SODIUM 1000 MG: 1 SOLUTION INTRAVENOUS at 12:45

## 2018-03-15 NOTE — DISCHARGE INSTRUCTIONS
PLEASE RESTART XARELTO TOMORROW NIGHT (3/16/2018)  Please refer to post pacemaker implantation discharge instructions and restrictions and your pacemaker booklet/temporary card  Keep incision dry for one week  Do not use lotions/powders/creams on incision  Remove outer bandage 48 hours after implantation  Leave underlying steri-strips in place, they will either fall off on their own or will be removed at 2 week follow up appointment  Please call the office if you notice redness, swelling, bleeding, or drainage from incision or if you develop fevers  AFTER PACEMAKER CARE:    If you have any questions, please call [phone] to speak with a nurse (8:30am-4pm, or [phone] after hours)  For appointments, please call [phone]  WHAT YOU SHOULD KNOW:   A pacemaker is a small, battery-powered device that is placed under your skin in your upper chest area with wires placed through a vein that lead directly into the heart  It helps regulate your heart rate and prevent your heart from beating too slowly                  AFTER YOU LEAVE:     Medicines:     · Pain medicine: You may need medicine to take away or decrease pain  ¨ Learn how to take your medicine  Ask what medicine and how much you should take  Be sure you know how, when, and how often to take it  Usually Over the counter pain medicine is sufficient to control pain (Acetominophen or Ibuprofen) Ask your doctor if you may take these  If this does not control your pain, narcotic pain killers may be prescribed, please call if you need prescription  ¨ Do not wait until the pain is severe before you take your medicine  Tell caregivers if your pain does not decrease  ¨ Pain medicine can make you dizzy or sleepy  Prevent falls by calling someone when you get out of bed or if you need help  Take your medicine as directed  Call your healthcare provider if you think your medicine is not helping or if you have side effects   Tell him if you are allergic to any medicine  Follow up with your cardiologist after your procedure: You will need a follow-up visit approximately 2 weeks after you leave the hospital  Your cardiologist will check your wound and make sure that your pacemaker is working correctly  Follow the instructions to check your pacemaker: Your cardiologist or primary healthcare provider will check your pacemaker and the battery regularly  He will use a computer to check your pacemaker over the telephone or wireless device which will be given to you  Pacemaker batteries usually last 5 to 10 years  The pacemaker unit will be replaced when the battery gets low  This is a simpler procedure than the original one to implant your pacemaker  Wound care:  Keep your incision dry for one week  Sponge/tub baths are preferred, try to avoid a shower for 7 days  Do not use lotions/powders/creams on incision  Remove outer bandage 48 hours after implantation  Leave underlying steri-strips in place, they will either fall off on their own or will be removed at 2 week follow up appointment  Please call the office if you notice redness, swelling, bleeding, or drainage from incision or if you develop fevers  Activity:   · You may resume your normal activity following a generator change  · Typically driving is allowed after 1 week post pacemaker if you are stable, however in some cases it may be longer and you should discuss with your doctor before proceeding  · Sports:  Ask your caregiver when it is okay to play tennis, golf, basketball, or any sport that requires you to lift your arms  Do not play full contact sports, such as football, that could damage your pacemaker  Ask your cardiologist or primary healthcare provider how much and what kinds of physical activity are safe for you  Living with a pacemaker:   · Tell all caregivers you have a pacemaker: This includes surgeons, radiologists, and medical technicians   You may want to wear a medical alert ID bracelet or necklace that states that you have a pacemaker  · Carry your pacemaker ID card: Make sure you receive a pacemaker ID card  Carry it with you at all times  It lists important information about your pacemaker  Show it to airport security if you travel  · Avoid electrical interference:  Avoid welding equipment and other equipment with large magnets or electric fields  These things could interfere with how your pacemaker works  Use your cell phone on the ear opposite from your pacemaker  Do not carry your cell phone in your shirt pocket over your chest      · Some Pacemakers are MRI safe  Ask you doctor if it is safe to proceed with MRI and let the radiologist and staff know you have a pacemaker  · Do not touch the skin around your pacemaker: This can cause damage to the lead wires or move the pacemaker unit from where it should be  Contact your cardiologist or primary healthcare provider if:   · The area around your pacemaker has increasing amount of pain after surgery  The pain should improve over first few days after implantation  · The skin around your stitches has increasing redness, swelling, or has drainage  This may mean that you have an infection  · You have a fever  · You have chills, a cough, and feel weak or achy  These are also signs of infection  · Your feet or ankles are more swollen than your baseline  · Your Heart rate is less than 50 beats per minute     Seek care immediately if:   · Your bandage becomes soaked with blood  · Your pacemaker is swelling rapidly    · Your stitches open up  · You feel your heart suddenly beating very slowly or quickly  · You become too weak or dizzy to stand, or you pass out  · Your arm or leg feels warm, tender, and painful  It may look swollen and red  · You have chest pain that does not go away with rest or medicine       · You feel lightheaded, short of breath, and have chest pain  · You cough up blood  © 2014 [address] is for End User's use only and may not be sold, redistributed or otherwise used for commercial purposes  All illustrations and images included in CareNotes® are the copyrighted property of [employer]  or [employer]  The above information is an  only  It is not intended as medical advice for individual conditions or treatments  Talk to your doctor, nurse or pharmacist before following any medical regimen to see if it is safe and effective for you

## 2018-03-15 NOTE — ANESTHESIA PREPROCEDURE EVALUATION
Review of Systems/Medical History  Patient summary reviewed  Chart reviewed  No history of anesthetic complications     Cardiovascular  EKG reviewed, Exercise tolerance: good,  Pacemaker/AICD, Hyperlipidemia, Hypertension controlled,    Pulmonary       GI/Hepatic    GERD well controlled,             Endo/Other  Negative endo/other ROS      GYN       Hematology  Anemia ,     Musculoskeletal    Arthritis     Neurology  Negative neurology ROS      Psychology   Negative psychology ROS              Physical Exam    Airway    Mallampati score: II  TM Distance: >3 FB  Neck ROM: full     Dental   No notable dental hx     Cardiovascular      Pulmonary      Other Findings        Anesthesia Plan  ASA Score- 3     Anesthesia Type- IV sedation with anesthesia with ASA Monitors  Additional Monitors:   Airway Plan:     Comment: IV sedation, GA back up; standard ASA monitors  Risks and benefits discussed with patient; patient consented and agrees to proceed  I saw and evaluated the patient  If seen with CRNA, we have discussed the anesthetic plan and I am in agreement that the plan is appropriate for the patient        Plan Factors-  Patient did not smoke on day of surgery  Induction- intravenous  Postoperative Plan-     Informed Consent- Anesthetic plan and risks discussed with patient and son  I personally reviewed this patient with the CRNA  Discussed and agreed on the Anesthesia Plan with the CRNA  Milad Dewitt

## 2018-03-26 ENCOUNTER — CLINICAL SUPPORT (OUTPATIENT)
Dept: INTERNAL MEDICINE CLINIC | Facility: CLINIC | Age: 78
End: 2018-03-26
Payer: COMMERCIAL

## 2018-03-26 DIAGNOSIS — E53.8 VITAMIN B12 DEFICIENCY: Chronic | ICD-10-CM

## 2018-03-26 DIAGNOSIS — I48.91 ATRIAL FIBRILLATION (HCC): Primary | ICD-10-CM

## 2018-03-26 PROCEDURE — 96372 THER/PROPH/DIAG INJ SC/IM: CPT

## 2018-03-26 RX ADMIN — CYANOCOBALAMIN 1000 MCG: 1000 INJECTION INTRAMUSCULAR; SUBCUTANEOUS at 09:22

## 2018-03-26 NOTE — PROGRESS NOTES
Pt  CAME INTO THE OFFICE TODAY FOR A NURSE VISIT FOR A VIT  B12 INJECTION   Pt  BROUGHT HER OWN SERUM AND 1 0 ML WAS GIVEN IN RIGHT DELTOID

## 2018-04-02 ENCOUNTER — CLINICAL SUPPORT (OUTPATIENT)
Dept: CARDIOLOGY CLINIC | Facility: CLINIC | Age: 78
End: 2018-04-02

## 2018-04-02 ENCOUNTER — TELEPHONE (OUTPATIENT)
Dept: CARDIOLOGY CLINIC | Facility: CLINIC | Age: 78
End: 2018-04-02

## 2018-04-02 ENCOUNTER — ANESTHESIA EVENT (OUTPATIENT)
Dept: PERIOP | Facility: AMBULARY SURGERY CENTER | Age: 78
End: 2018-04-02
Payer: COMMERCIAL

## 2018-04-02 DIAGNOSIS — I48.21 PERMANENT ATRIAL FIBRILLATION (HCC): Primary | ICD-10-CM

## 2018-04-02 DIAGNOSIS — Z95.0 CARDIAC PACEMAKER IN SITU: ICD-10-CM

## 2018-04-02 PROCEDURE — 99024 POSTOP FOLLOW-UP VISIT: CPT | Performed by: INTERNAL MEDICINE

## 2018-04-02 NOTE — PROGRESS NOTES
PPM INTERROGATED IN THE [place] OFFICE  WOUND CHECK: INCISION CLEAN AND DRY WITH EDGES APPROXIMATED; WOUND CARE AND RESTRICTIONS REVIEWED WITH PATIENT  BATTERY VOLTAGE ADEQUATE (AMY - 14 4 YRS)   - 52% (>40%/VVIR 60)  NO HIGH RATE EPISODES  NO PROGRAMMING CHANGES MADE TO DEVICE PARAMETERS   NORMAL DEVICE FUNCTION   eb

## 2018-04-16 ENCOUNTER — HOSPITAL ENCOUNTER (OUTPATIENT)
Facility: AMBULARY SURGERY CENTER | Age: 78
Setting detail: OUTPATIENT SURGERY
Discharge: HOME | End: 2018-04-16
Attending: INTERNAL MEDICINE | Admitting: INTERNAL MEDICINE
Payer: COMMERCIAL

## 2018-04-16 ENCOUNTER — ANESTHESIA (OUTPATIENT)
Dept: PERIOP | Facility: AMBULARY SURGERY CENTER | Age: 78
End: 2018-04-16
Payer: COMMERCIAL

## 2018-04-16 VITALS
BODY MASS INDEX: 23.04 KG/M2 | TEMPERATURE: 97.5 F | RESPIRATION RATE: 18 BRPM | HEART RATE: 80 BPM | OXYGEN SATURATION: 99 % | DIASTOLIC BLOOD PRESSURE: 78 MMHG | SYSTOLIC BLOOD PRESSURE: 132 MMHG | HEIGHT: 63 IN | WEIGHT: 130 LBS

## 2018-04-16 PROCEDURE — 43235 EGD DIAGNOSTIC BRUSH WASH: CPT | Performed by: INTERNAL MEDICINE

## 2018-04-16 RX ORDER — SODIUM CHLORIDE 9 MG/ML
50 INJECTION, SOLUTION INTRAVENOUS CONTINUOUS
Status: DISCONTINUED | OUTPATIENT
Start: 2018-04-16 | End: 2018-04-16 | Stop reason: HOSPADM

## 2018-04-16 RX ORDER — PROPOFOL 10 MG/ML
INJECTION, EMULSION INTRAVENOUS AS NEEDED
Status: DISCONTINUED | OUTPATIENT
Start: 2018-04-16 | End: 2018-04-16 | Stop reason: SURG

## 2018-04-16 RX ORDER — PROPOFOL 10 MG/ML
INJECTION, EMULSION INTRAVENOUS CONTINUOUS PRN
Status: DISCONTINUED | OUTPATIENT
Start: 2018-04-16 | End: 2018-04-16 | Stop reason: SURG

## 2018-04-16 RX ORDER — EPHEDRINE SULFATE 50 MG/ML
INJECTION, SOLUTION INTRAVENOUS AS NEEDED
Status: DISCONTINUED | OUTPATIENT
Start: 2018-04-16 | End: 2018-04-16 | Stop reason: SURG

## 2018-04-16 RX ADMIN — EPHEDRINE SULFATE 10 MG: 50 INJECTION, SOLUTION INTRAMUSCULAR; INTRAVENOUS; SUBCUTANEOUS at 09:34

## 2018-04-16 RX ADMIN — EPHEDRINE SULFATE 10 MG: 50 INJECTION, SOLUTION INTRAMUSCULAR; INTRAVENOUS; SUBCUTANEOUS at 09:37

## 2018-04-16 RX ADMIN — PROPOFOL 50 MG: 10 INJECTION, EMULSION INTRAVENOUS at 09:29

## 2018-04-16 RX ADMIN — PROPOFOL 30 MG: 10 INJECTION, EMULSION INTRAVENOUS at 09:37

## 2018-04-16 RX ADMIN — SODIUM CHLORIDE 50 ML/HR: 0.9 INJECTION, SOLUTION INTRAVENOUS at 08:53

## 2018-04-16 RX ADMIN — PROPOFOL 70 MCG/KG/MIN: 10 INJECTION, EMULSION INTRAVENOUS at 09:29

## 2018-04-16 RX ADMIN — PROPOFOL 50 MG: 10 INJECTION, EMULSION INTRAVENOUS at 09:25

## 2018-04-16 NOTE — ANESTHESIA PREPROCEDURE EVALUATION
Review of Systems/Medical History  Patient summary reviewed  Chart reviewed  No history of anesthetic complications     Cardiovascular  EKG reviewed, Exercise tolerance: good,  Pacemaker/AICD, Hyperlipidemia, Hypertension controlled, Dysrhythmias, atrial fibrillation,    Pulmonary       GI/Hepatic    GERD well controlled,             Endo/Other  Negative endo/other ROS      GYN       Hematology  Anemia ,     Musculoskeletal    Arthritis     Neurology  Negative neurology ROS      Psychology   Negative psychology ROS              Physical Exam    Airway    Mallampati score: II  TM Distance: >3 FB  Neck ROM: full     Dental       Cardiovascular      Pulmonary      Other Findings        Anesthesia Plan  ASA Score- 3     Anesthesia Type- IV sedation with anesthesia with ASA Monitors  Additional Monitors:   Airway Plan:         Plan Factors-    Induction- intravenous  Postoperative Plan-     Informed Consent- Anesthetic plan and risks discussed with patient  I personally reviewed this patient with the CRNA  Discussed and agreed on the Anesthesia Plan with the CRNA  Joe Ghotra

## 2018-04-16 NOTE — H&P
History and Physical - [facility]  Blanquita Joe 68 y o  female MRN: [id number]                  HPI: Blanquita Joe is a 68y o  year old female who presents for history of GERD  Also history of tubular adenoma  She here for EGD and colonoscopy  She has a history of AFib and is on Xarelto  She last took her xarelto yesterday am       REVIEW OF SYSTEMS: Per the HPI, and otherwise unremarkable  Historical Information   Past Medical History:   Diagnosis Date    A-fib ([address] )     Anemia     Arthritis     Breast pain, right     Chest pain on breathing     Cough     Encounter for screening colonoscopy     H/O sick sinus syndrome     Heart murmur     High cholesterol     History of atrial fibrillation     Rate ontrolled  On xarelto 15mg (creatinine 50) Followed by Dr Carlena Paget with Cardiology     History of weight loss     Report loose fitting clothes  Weight stable (3lbs difference)  Last colo showed small tubular adenoma x2  Breast biopsy last showed fibrocystic changes  TSH wnl  Will check cbc, bmp, spep          Hx of long term use of blood thinners     Hypertension     Irregular heart beat     Pacemaker     Preop examination     Shortness of breath     Viral bronchitis      Past Surgical History:   Procedure Laterality Date    BREAST BIOPSY      Percutaneous Needle Core     CATARACT EXTRACTION W/ INTRAOCULAR LENS  IMPLANT, BILATERAL      COLONOSCOPY      INSERT / REPLACE / REMOVE PACEMAKER      LEG SURGERY Right     as a child after a fall    NC CMBND ANTERPOST COLPORRAPHY W/CYSTO N/A 3/17/2016    Procedure: COLPORRHAPHY ANTERIOR POSTERIOR ;  Surgeon: Charmaine Cormier MD;  Location: [facility];  Service: Gynecology    NC CYSTOURETHROSCOPY N/A 3/17/2016    Procedure: Aram Desai;  Surgeon: Charmaine Cormier MD;  [facility];  Service: Gynecology    NC REVAGINAL PROLAPSE,SACROSP LIG N/A 3/17/2016    Procedure: COLPOPEXY VAGINAL EXTRAPERITONEAL (VEC) ANTERIOR ;  Surgeon: Peter Morley MD;  [facility];  Service: Gynecology    UT SLING OPER STRES INCONTINENCE N/A 3/17/2016    Procedure: INSERTION PUBOVAGINAL SLING SINGLE INCISION ;  Surgeon: Peter Morley MD;  [facility];  Service: Gynecology     Social History   History   Alcohol Use No     History   Drug Use No     History   Smoking Status    Never Smoker   Smokeless Tobacco    Never Used     Family History   Problem Relation Age of Onset    Diabetes Mother        Meds/Allergies     Facility-Administered Medications Prior to Admission   Medication    [DISCONTINUED] cyanocobalamin injection 1,000 mcg     Prescriptions Prior to Admission   Medication    Calcium Carb-Cholecalciferol (CALCIUM 500+D3) 500-400 MG-UNIT TABS    Cyanocobalamin 1000 MCG/ML KIT    indapamide (LOZOL) 2 5 mg tablet    magnesium oxide (MAG-OX) 400 mg    nebivolol (BYSTOLIC) 5 mg tablet    olmesartan (BENICAR) 40 mg tablet    pantoprazole (PROTONIX) 40 mg tablet    rivaroxaban (XARELTO) 15 mg tablet    spironolactone (ALDACTONE) 25 mg tablet    verapamil (CALAN) 120 mg tablet    acetaminophen (TYLENOL) 325 mg tablet    Na Sulfate-K Sulfate-Mg Sulf (SUPREP BOWEL PREP KIT) 17 5-3 13-1 6 GM/180ML SOLN       Allergies   Allergen Reactions    Other Itching     Bandaids, tape    redness       Objective     Blood pressure 113/57, pulse 61, temperature 97 6 °F (36 4 °C), temperature source Temporal, resp  rate 18, height 5' 3" (1 6 m), weight 59 kg (130 lb), SpO2 99 %  PHYSICAL EXAM    Gen: NAD  CV: RRR  CHEST: Clear  ABD: soft, NT/ND  EXT: no edema      ASSESSMENT/PLAN:  This is a 68y o  year old female here for EGD and colonoscopy  PLAN: EGD and colonoscopy

## 2018-04-16 NOTE — OP NOTE
OPERATIVE REPORT  PATIENT NAME: Awilda De Jesus    :  1940  MRN: [id number]  Pt Location: [facility]  GI ROOM 01    SURGERY DATE: 2018    Surgeon(s) and Role:     Yosvany Marino, DO - Primary    Preop Diagnosis:  Tubular adenoma [D36 9]  Gastroesophageal reflux disease without esophagitis [K21 9]    Post-Op Diagnosis Codes:     * Tubular adenoma [D36 9]     * Gastroesophageal reflux disease without esophagitis [K21 9]    Procedure(s) (LRB):  EGD AND COLONOSCOPY (N/A)    Specimen(s):  * No specimens in log *    Estimated Blood Loss:   Minimal    Drains:       Anesthesia Type:   IV Sedation with Anesthesia    Operative Indications:  Tubular adenoma [D36 9]  Gastroesophageal reflux disease without esophagitis [K21 9]      Operative Findings:    ESOPHAGOGASTRODUODENOSCOPY    PROCEDURE: EGD    SEDATION: Monitored anesthesia care, check anesthesia records    ASA Class: 3    INDICATIONS:  GERD    CONSENT:  Informed consent was obtained for the procedure, including sedation after explaining the risks and benefits of the procedure  Risks including but not limited to bleeding, perforation, infection, and missed lesion  PREPARATION:   Telemetry, pulse oximetry, blood pressure were monitored throughout the procedure  Patient was identified by myself both verbally and by visual inspection of ID band  DESCRIPTION:   Patient was placed in the left lateral decubitus position and was sedated with the above medication  The gastroscope was introduced in to the oropharynx and the esophagus was intubated under direct visualization  Scope was passed down the esophagus up to 2nd part of the duodenum  A careful inspection was made as the gastroscope was withdrawn, including a retroflexed view of the stomach; findings and interventions are described below  FINDINGS:    #1  Esophagus-normal esophagus    #2  Stomach-normal stomach    #3   Duodenum-normal duodenum     IMPRESSIONS:    Normal upper endoscopy  RECOMMENDATIONS:   Proceed with colonoscopy  COMPLICATIONS:  None; patient tolerated the procedure well  DISPOSITION: PACU           CONDITION: Stable    Colonoscopy Procedure Note    Procedure: Colonoscopy    Sedation: Monitored anesthesia care, check anesthesia records      ASA Class: 3    INDICATIONS:  History of tubulovillous adenoma, last colonoscopy in 2015    POST-OP DIAGNOSIS: See the impression below    Procedure Details     Prior colonoscopy: 3 years ago  Informed consent was obtained for the procedure, including sedation  Risks of perforation, hemorrhage, adverse drug reaction and aspiration were discussed  The patient was placed in the left lateral decubitus position  Based on the pre-procedure assessment, including review of the patient's medical history, medications, allergies, and review of systems, she had been deemed to be an appropriate candidate for conscious sedation; she was therefore sedated with the medications listed below  The patient was monitored continuously with telemetry, pulse oximetry, blood pressure monitoring, and direct observations  A rectal examination was performed  The colonoscope was inserted into the rectum and advanced under direct vision to the cecum, which was identified by the ileocecal valve and appendiceal orifice  The quality of the colonic preparation was good  A careful inspection was made as the colonoscope was withdrawn, including a retroflexed view of the rectum; findings and interventions are described below  Findings:  Prior seen lipomas in the ascending colon adjacent to prior tattoo site  At the tattoo site there was regrowth of large tubulovillous adenoma removed prior  The polyp was sessile and over 1 cm in size  This was not removed today as patient did not hold her Xarelto  Complications:  None; patient tolerated the procedure well      Impression:    Prior seen lipomas in the ascending colon adjacent to prior tattoo site  At the tattoo site there was regrowth of large tubulovillous adenoma removed prior  The polyp was sessile and over 1 cm in size  This was not removed today as patient did not hold her Xarelto  Recommendations: Will need to repeat colonoscopy and hold the Xarelto for 2 days prior to procedure in order for polyp to be removed                SIGNATURE: Myriam Courtney DO  DATE: April 16, 2018  TIME: 9:29 AM

## 2018-04-16 NOTE — ANESTHESIA POSTPROCEDURE EVALUATION
Post-Op Assessment Note      CV Status:  Stable    Mental Status:  Awake    Hydration Status:  Euvolemic    PONV Controlled:  Controlled    Airway Patency:  Patent    Post Op Vitals Reviewed: Yes          Staff: CRNA           BP   95/50   Temp      Pulse  afib paced 76   Resp   20   SpO2   96 RA

## 2018-04-18 ENCOUNTER — TELEPHONE (OUTPATIENT)
Dept: GASTROENTEROLOGY | Facility: CLINIC | Age: 78
End: 2018-04-18

## 2018-04-18 NOTE — TELEPHONE ENCOUNTER
Spoke to pt she is sched on 05/22/2018 with dr Philip Beauchamp pt is aware she can  suprep sample at the [facility]  I sent medication clearance to dr Christa garcia the pts xarelto requesting a two day hold   Pt is aware we will call her when we have a response from the doctor

## 2018-04-18 NOTE — TELEPHONE ENCOUNTER
Rey Ramirez,   I called pt to reschedule her colon procedure  I looked through the pts chart it looks like we did get a medication clearance for the pts Xarelto (scanned under media) pt was contacted and told to hold her xarelto per Dr Sully Singh request  Pt states she forgot to hold her xarelto prior to the procedure  She is aware we will contact Dr Sully Singh again and ask to hold her Xarelto 2 days  Should be only be scheduling a repeat colon?  She believes you told her she may need to have the egd done as welll please advise   Thanks   Alize Heath

## 2018-04-18 NOTE — TELEPHONE ENCOUNTER
Thanks for looking into this J[facility] Francis  She only needs a repeat colonoscopy, not the EGD     Thanks so much,  Cathy Casiano

## 2018-04-30 ENCOUNTER — OFFICE VISIT (OUTPATIENT)
Dept: INTERNAL MEDICINE CLINIC | Facility: CLINIC | Age: 78
End: 2018-04-30
Payer: COMMERCIAL

## 2018-04-30 ENCOUNTER — TELEPHONE (OUTPATIENT)
Dept: GASTROENTEROLOGY | Facility: MEDICAL CENTER | Age: 78
End: 2018-04-30

## 2018-04-30 ENCOUNTER — APPOINTMENT (OUTPATIENT)
Dept: LAB | Facility: CLINIC | Age: 78
End: 2018-04-30
Payer: COMMERCIAL

## 2018-04-30 VITALS
TEMPERATURE: 97.7 F | DIASTOLIC BLOOD PRESSURE: 60 MMHG | WEIGHT: 131.39 LBS | HEIGHT: 63 IN | SYSTOLIC BLOOD PRESSURE: 110 MMHG | BODY MASS INDEX: 23.28 KG/M2 | HEART RATE: 84 BPM

## 2018-04-30 DIAGNOSIS — K21.9 GASTROESOPHAGEAL REFLUX DISEASE WITHOUT ESOPHAGITIS: Chronic | ICD-10-CM

## 2018-04-30 DIAGNOSIS — R11.2 INTRACTABLE VOMITING WITH NAUSEA, UNSPECIFIED VOMITING TYPE: ICD-10-CM

## 2018-04-30 DIAGNOSIS — M19.031: Primary | ICD-10-CM

## 2018-04-30 DIAGNOSIS — M19.032: ICD-10-CM

## 2018-04-30 DIAGNOSIS — E53.8 VITAMIN B12 DEFICIENCY: Chronic | ICD-10-CM

## 2018-04-30 DIAGNOSIS — M19.031: ICD-10-CM

## 2018-04-30 DIAGNOSIS — D36.9 TUBULAR ADENOMA: ICD-10-CM

## 2018-04-30 DIAGNOSIS — M19.032: Primary | ICD-10-CM

## 2018-04-30 DIAGNOSIS — S90.822D: ICD-10-CM

## 2018-04-30 PROBLEM — M20.11 HALLUX ABDUCTO VALGUS, BILATERAL: Status: ACTIVE | Noted: 2017-06-27

## 2018-04-30 PROBLEM — M79.675 PAIN DUE TO ONYCHOMYCOSIS OF TOENAILS OF BOTH FEET: Status: ACTIVE | Noted: 2017-01-20

## 2018-04-30 PROBLEM — M79.674 PAIN DUE TO ONYCHOMYCOSIS OF TOENAILS OF BOTH FEET: Status: ACTIVE | Noted: 2017-01-20

## 2018-04-30 PROBLEM — K52.9 CHRONIC DIARRHEA: Status: ACTIVE | Noted: 2018-01-02

## 2018-04-30 PROBLEM — R19.7 ACUTE DIARRHEA: Status: ACTIVE | Noted: 2018-01-02

## 2018-04-30 PROBLEM — D17.21 LIPOMA OF RIGHT UPPER EXTREMITY: Status: ACTIVE | Noted: 2017-05-17

## 2018-04-30 PROBLEM — M20.12 HALLUX ABDUCTO VALGUS, BILATERAL: Status: ACTIVE | Noted: 2017-06-27

## 2018-04-30 PROBLEM — K52.9 CHRONIC DIARRHEA: Status: RESOLVED | Noted: 2018-01-02 | Resolved: 2018-04-30

## 2018-04-30 PROBLEM — B35.1 PAIN DUE TO ONYCHOMYCOSIS OF TOENAILS OF BOTH FEET: Status: ACTIVE | Noted: 2017-01-20

## 2018-04-30 PROBLEM — S90.822A FRICTION BLISTERS OF SOLE OF LEFT FOOT: Status: ACTIVE | Noted: 2018-04-30

## 2018-04-30 PROBLEM — Q66.70 CAVUS DEFORMITY OF FOOT: Status: ACTIVE | Noted: 2017-06-27

## 2018-04-30 PROCEDURE — 86430 RHEUMATOID FACTOR TEST QUAL: CPT

## 2018-04-30 PROCEDURE — 96372 THER/PROPH/DIAG INJ SC/IM: CPT | Performed by: INTERNAL MEDICINE

## 2018-04-30 PROCEDURE — 99213 OFFICE O/P EST LOW 20 MIN: CPT | Performed by: INTERNAL MEDICINE

## 2018-04-30 PROCEDURE — 86200 CCP ANTIBODY: CPT

## 2018-04-30 PROCEDURE — 36415 COLL VENOUS BLD VENIPUNCTURE: CPT

## 2018-04-30 RX ORDER — PANTOPRAZOLE SODIUM 40 MG/1
40 TABLET, DELAYED RELEASE ORAL EVERY OTHER DAY
Qty: 90 TABLET | Refills: 0 | Status: ON HOLD | COMMUNITY
Start: 2018-04-30 | End: 2018-05-22 | Stop reason: ALTCHOICE

## 2018-04-30 RX ORDER — CYANOCOBALAMIN 1000 UG/ML
1000 INJECTION INTRAMUSCULAR; SUBCUTANEOUS
Status: COMPLETED | OUTPATIENT
Start: 2018-05-30 | End: 2018-12-12

## 2018-04-30 RX ORDER — CYANOCOBALAMIN 1000 UG/ML
1000 INJECTION INTRAMUSCULAR; SUBCUTANEOUS
Status: DISCONTINUED | OUTPATIENT
Start: 2018-04-30 | End: 2018-04-30

## 2018-04-30 RX ADMIN — CYANOCOBALAMIN 1000 MCG: 1000 INJECTION INTRAMUSCULAR; SUBCUTANEOUS at 10:39

## 2018-04-30 NOTE — ASSESSMENT & PLAN NOTE
· Colonoscopy on 4/2018 showed regrowth of tubular adenoma  Polyp was sessile and over 1 cm in length  However, could not remove as patient had not held her Xarelto  · Repeat colonoscopy in May of this year  Hold Xarelto for 2 days prior to procedure

## 2018-04-30 NOTE — ASSESSMENT & PLAN NOTE
· Advise wearing comfortable shoes  Change footwear if needed  · No red flags for signs of infection at this time  · Can treat with over-the-counter topical pain relief, foot powder     · Will monitor

## 2018-04-30 NOTE — ASSESSMENT & PLAN NOTE
· Primarily suspect osteoarthritis      · No x-rays on file  · However, due to possible family history and possibility of inflammatory arthritis, will obtain anti CCP and rheumatoid factor  · F/U results

## 2018-04-30 NOTE — PROGRESS NOTES
Assessment/Plan:    · Follow-up for annual wellness visit     Problem List Items Addressed This Visit        Digestive    RESOLVED: Intractable vomiting with nausea (Chronic)    Relevant Medications    pantoprazole (PROTONIX) 40 mg tablet    Gastroesophageal reflux disease without esophagitis (Chronic)     · Take Protonix every other day for 1 week  · If still not having any symptoms of reflux after 1 week, can discontinue this medication         Relevant Medications    pantoprazole (PROTONIX) 40 mg tablet       Musculoskeletal and Integument    Osteoarthritis of both wrists - Primary     · Primarily suspect osteoarthritis  · No x-rays on file  · However, due to possible family history and possibility of inflammatory arthritis, will obtain anti CCP and rheumatoid factor  · F/U results         Relevant Orders    RF Screen w/ Reflex to Titer    Cyclic citrul peptide antibody, IgG    Friction blisters of sole of left foot     · Advise wearing comfortable shoes  Change footwear if needed  · No red flags for signs of infection at this time  · Can treat with over-the-counter topical pain relief, foot powder  · Will monitor            Other    Vitamin B12 deficiency (Chronic)    Relevant Medications    cyanocobalamin injection 1,000 mcg (Start on 5/30/2018  9:00 AM)    Tubular adenoma     · Colonoscopy on 4/2018 showed regrowth of tubular adenoma  Polyp was sessile and over 1 cm in length  However, could not remove as patient had not held her Xarelto  · Repeat colonoscopy in May of this year  Hold Xarelto for 2 days prior to procedure  Subjective:      Patient ID: Aga Unger is a 68 y o  female  Patient presents today for a routine follow-up/annual examination  She has no new complaints today except for her chronic arthritis in her bilateral first MCP joints  This is been present for number of years    It is worse with activity and during the day and at times gets so bad that she cannot open jars  She denies morning stiffness, paresthesias, numbness, and  weakness  She does not take any medicine for this pain  Also has complaint of blisters in her left foot that occur some days but then resolve on their own  They itch and hurt  Podiatry did not see her for this in the past as the last time she saw them, they had resolved  She was previously seen for persistent nausea and vomiting which has since resolved  Patient had a recent colonoscopy done earlier this month during which a regrowth of the prior tubular adenoma was seen in the colon, this was sessile and over 1 cm in size  However, as the patient did not hold her Xarelto before hand, the polyp was not removed  Recent EGD done this month showed normal esophagus, stomach, and duodenum  Mammogram in 12/2017 showed stability of a left-sided cyst   Previous DEXA scan in 12/2016 at showed low bone mineral density but no overt osteoporosis  The following portions of the patient's history were reviewed and updated as appropriate: allergies, current medications, past family history, past medical history, past social history, past surgical history and problem list     Review of Systems   Constitutional: Negative for activity change, chills and fever  HENT: Negative  Negative for hearing loss, sore throat and trouble swallowing  Eyes: Negative for visual disturbance  Respiratory: Negative for cough, shortness of breath and wheezing  Cardiovascular: Negative for chest pain, palpitations and leg swelling  Gastrointestinal: Negative for abdominal distention, abdominal pain, blood in stool, constipation, diarrhea, nausea and vomiting  Endocrine: Negative  Genitourinary: Negative for dysuria, frequency and hematuria  Musculoskeletal: Positive for arthralgias  Negative for joint swelling and myalgias  Skin: Negative  Allergic/Immunologic: Negative for immunocompromised state     Neurological: Negative for dizziness, facial asymmetry, speech difficulty, weakness, numbness and headaches  Hematological: Negative  Psychiatric/Behavioral: Negative for behavioral problems, confusion, dysphoric mood and self-injury  Objective:  /60   Pulse 84   Temp 97 7 °F (36 5 °C)   Ht 5' 3" (1 6 m)   Wt 59 6 kg (131 lb 6 3 oz)   BMI 23 28 kg/m²      Physical Exam   Constitutional: She is oriented to person, place, and time  She appears well-developed and well-nourished  HENT:   Head: Normocephalic and atraumatic  Right Ear: External ear normal    Left Ear: External ear normal    Nose: Nose normal    Mouth/Throat: Oropharynx is clear and moist    Eyes: Conjunctivae and EOM are normal  Pupils are equal, round, and reactive to light  Neck: Normal range of motion  Neck supple  No JVD present  No tracheal deviation present  Cardiovascular: Normal rate, regular rhythm, normal heart sounds and intact distal pulses  Pulmonary/Chest: Effort normal and breath sounds normal    Abdominal: Soft  She exhibits no distension  There is no tenderness  Musculoskeletal: Normal range of motion  She exhibits no edema  Right wrist: She exhibits tenderness  She exhibits no deformity  Left wrist: She exhibits tenderness  She exhibits no deformity  Right hand: She exhibits deformity ([place] neck deformity in first digit)  Normal sensation noted  Normal strength noted  Left hand: She exhibits deformity ([place] neck deformity in first digit)  Normal sensation noted  Normal strength noted  Neurological: She is alert and oriented to person, place, and time  She has normal reflexes  No cranial nerve deficit  Skin: Skin is warm and dry  Three small, scattered vesicular lesions near the left medial malleolus  Tiny, macular, brown purpuric lesions in medial left foot  Suspicious for friction blisters in various stages of healing  Psychiatric: She has a normal mood and affect   Her behavior is normal  Vitals reviewed

## 2018-04-30 NOTE — TELEPHONE ENCOUNTER
Spoke to pt ([place]) she saw her pcp today (note in epic) 04/30/2018 as noted by pcp Dr Ophelia Castro pt should hold xarelto 2 days prior to procedure  I called pt to verify that she was told to stop xarelto 2 days prior during her office visit per pt she was told and she will hold her xarelto prior to her procedure appt on 05/22/2018

## 2018-04-30 NOTE — ASSESSMENT & PLAN NOTE
· Take Protonix every other day for 1 week  · If still not having any symptoms of reflux after 1 week, can discontinue this medication

## 2018-04-30 NOTE — PATIENT INSTRUCTIONS
Repeat colonoscopy this year  Hold Xarelto for 2 days prior to procedure     Obtain two blood tests to test for rheumatoid arthritis   You can take Tylenol or NSAIDs such as ibuprofen or Aleve for your pain as needed  Take your protonix every other day for one week - if you have no heartburn, you can stop taking the medicine  Obtain mammogram and DEXA scan in December of this year; will order on follow-up

## 2018-05-01 LAB — RHEUMATOID FACT SER QL LA: NEGATIVE

## 2018-05-02 LAB — CCP IGA+IGG SERPL IA-ACNC: 5 UNITS (ref 0–19)

## 2018-05-10 ENCOUNTER — ANESTHESIA EVENT (OUTPATIENT)
Dept: PERIOP | Facility: AMBULARY SURGERY CENTER | Age: 78
End: 2018-05-10
Payer: COMMERCIAL

## 2018-05-22 ENCOUNTER — HOSPITAL ENCOUNTER (OUTPATIENT)
Facility: AMBULARY SURGERY CENTER | Age: 78
Setting detail: OUTPATIENT SURGERY
Discharge: HOME | End: 2018-05-22
Attending: INTERNAL MEDICINE | Admitting: INTERNAL MEDICINE
Payer: COMMERCIAL

## 2018-05-22 ENCOUNTER — ANESTHESIA (OUTPATIENT)
Dept: PERIOP | Facility: AMBULARY SURGERY CENTER | Age: 78
End: 2018-05-22
Payer: COMMERCIAL

## 2018-05-22 VITALS
HEART RATE: 73 BPM | BODY MASS INDEX: 22.15 KG/M2 | OXYGEN SATURATION: 100 % | HEIGHT: 63 IN | DIASTOLIC BLOOD PRESSURE: 58 MMHG | SYSTOLIC BLOOD PRESSURE: 107 MMHG | RESPIRATION RATE: 24 BRPM | TEMPERATURE: 97.3 F | WEIGHT: 125 LBS

## 2018-05-22 DIAGNOSIS — D36.9 TUBULAR ADENOMA: ICD-10-CM

## 2018-05-22 PROCEDURE — 88305 TISSUE EXAM BY PATHOLOGIST: CPT | Performed by: PATHOLOGY

## 2018-05-22 PROCEDURE — 45385 COLONOSCOPY W/LESION REMOVAL: CPT | Performed by: INTERNAL MEDICINE

## 2018-05-22 RX ORDER — SODIUM CHLORIDE 9 MG/ML
100 INJECTION, SOLUTION INTRAVENOUS CONTINUOUS
Status: DISCONTINUED | OUTPATIENT
Start: 2018-05-22 | End: 2018-05-22 | Stop reason: HOSPADM

## 2018-05-22 RX ORDER — SODIUM CHLORIDE 9 MG/ML
INJECTION, SOLUTION INTRAVENOUS CONTINUOUS PRN
Status: DISCONTINUED | OUTPATIENT
Start: 2018-05-22 | End: 2018-05-22 | Stop reason: SURG

## 2018-05-22 RX ORDER — PROPOFOL 10 MG/ML
INJECTION, EMULSION INTRAVENOUS AS NEEDED
Status: DISCONTINUED | OUTPATIENT
Start: 2018-05-22 | End: 2018-05-22 | Stop reason: SURG

## 2018-05-22 RX ADMIN — PROPOFOL 20 MG: 10 INJECTION, EMULSION INTRAVENOUS at 11:45

## 2018-05-22 RX ADMIN — PROPOFOL 30 MG: 10 INJECTION, EMULSION INTRAVENOUS at 11:38

## 2018-05-22 RX ADMIN — PROPOFOL 50 MG: 10 INJECTION, EMULSION INTRAVENOUS at 11:30

## 2018-05-22 RX ADMIN — PROPOFOL 30 MG: 10 INJECTION, EMULSION INTRAVENOUS at 11:40

## 2018-05-22 RX ADMIN — PROPOFOL 10 MG: 10 INJECTION, EMULSION INTRAVENOUS at 11:59

## 2018-05-22 RX ADMIN — PROPOFOL 20 MG: 10 INJECTION, EMULSION INTRAVENOUS at 11:51

## 2018-05-22 RX ADMIN — PROPOFOL 10 MG: 10 INJECTION, EMULSION INTRAVENOUS at 11:56

## 2018-05-22 RX ADMIN — PROPOFOL 30 MG: 10 INJECTION, EMULSION INTRAVENOUS at 11:46

## 2018-05-22 RX ADMIN — PROPOFOL 20 MG: 10 INJECTION, EMULSION INTRAVENOUS at 11:35

## 2018-05-22 RX ADMIN — PROPOFOL 20 MG: 10 INJECTION, EMULSION INTRAVENOUS at 12:01

## 2018-05-22 RX ADMIN — PROPOFOL 20 MG: 10 INJECTION, EMULSION INTRAVENOUS at 11:47

## 2018-05-22 RX ADMIN — PROPOFOL 10 MG: 10 INJECTION, EMULSION INTRAVENOUS at 11:53

## 2018-05-22 RX ADMIN — PROPOFOL 10 MG: 10 INJECTION, EMULSION INTRAVENOUS at 11:57

## 2018-05-22 RX ADMIN — PROPOFOL 50 MG: 10 INJECTION, EMULSION INTRAVENOUS at 11:25

## 2018-05-22 RX ADMIN — SODIUM CHLORIDE: 0.9 INJECTION, SOLUTION INTRAVENOUS at 10:50

## 2018-05-22 RX ADMIN — PROPOFOL 20 MG: 10 INJECTION, EMULSION INTRAVENOUS at 11:42

## 2018-05-22 NOTE — ANESTHESIA POSTPROCEDURE EVALUATION
Post-Op Assessment Note      CV Status:  Stable    Hydration Status:  Stable    PONV Controlled:  None    Airway Patency:  Patent    Post Op Vitals Reviewed: Yes          Staff: CRNA           BP   111/59   Temp      Pulse  69   Resp   14   SpO2   100% on RA   Post procedure VS noted above, SV non obstructed

## 2018-05-22 NOTE — OP NOTE
OPERATIVE REPORT  PATIENT NAME: Manuela Jamison    :  1940  MRN: [id number]  Pt [facility]    SURGERY DATE: 2018    Surgeon(s) and Role:     * Jerry Allison DO - Primary    Preop Diagnosis:  Tubular adenoma [D36 9]    Post-Op Diagnosis Codes:     * Tubular adenoma [D36 9]    Procedure(s) (LRB):  COLONOSCOPY (N/A)    Specimen(s):  ID Type Source Tests Collected by Time Destination   1 : ascending polyp hot snare Tissue Polyp, Colorectal TISSUE EXAM Jerry KeeganDO 2018 1135        Estimated Blood Loss:   Minimal    Drains:       Anesthesia Type:   Choice    Operative Indications:  Tubular adenoma [D36 9]      Operative Findings:    Colonoscopy Procedure Note    Procedure: Colonoscopy    Sedation: Monitored anesthesia care, check anesthesia records      ASA Class: 2    INDICATIONS: known large polyp- for polypectomy    POST-OP DIAGNOSIS: See the impression below    Procedure Details     Prior colonoscopy: Less than 3 years ago  It is being repeated at an interval of less than 3 years because: Piecemeal removal of polyps    Informed consent was obtained for the procedure, including sedation  Risks of perforation, hemorrhage, adverse drug reaction and aspiration were discussed  The patient was placed in the left lateral decubitus position  Based on the pre-procedure assessment, including review of the patient's medical history, medications, allergies, and review of systems, she had been deemed to be an appropriate candidate for conscious sedation; she was therefore sedated with the medications listed below  The patient was monitored continuously with telemetry, pulse oximetry, blood pressure monitoring, and direct observations  A rectal examination was performed  The colonoscope was inserted into the rectum and advanced under direct vision to the cecum, which was identified by the ileocecal valve and appendiceal orifice    The quality of the colonic preparation was good   A careful inspection was made as the colonoscope was withdrawn, including a retroflexed view of the rectum; findings and interventions are described below  Findings:  Prior seen lipoma was an ascending colon adjacent to prior tattoo site  At the tattoo site there was a large polyp  It was care home between 1 fold and another  I removed this in piecemeal fashion with hot snare polypectomy as well as large biopsy forceps  There was oozing of blood after the polypectomy and 2 clips were placed successfully  There was no bleeding afterwards  Complications:  None; patient tolerated the procedure well  Impression:    Prior seen lipoma was an ascending colon adjacent to prior tattoo site  At the tattoo site there was a large polyp  It was care home between 1 fold and another  I removed this in piecemeal fashion with hot snare polypectomy as well as large biopsy forceps  There was oozing of blood after the polypectomy and 2 clips were placed successfully  There was no bleeding afterwards  Recommendations:  Await pathology results  Repeat colonoscopy in 6 months to 1 year with holding of Xarelto for 2 days prior for relook at the polypectomy site  Can resume Xarelto tomorrow        Radhika Tran DO  DATE: May 22, 2018  TIME: 12:04 PM

## 2018-05-22 NOTE — ANESTHESIA PREPROCEDURE EVALUATION
Review of Systems/Medical History  Patient summary reviewed  Chart reviewed  No history of anesthetic complications     Cardiovascular  Pacemaker/AICD, Hyperlipidemia, Hypertension controlled,    Pulmonary  Shortness of breath,        GI/Hepatic       Negative  ROS        Endo/Other  Negative endo/other ROS      GYN       Hematology  Anemia ,     Musculoskeletal    Arthritis     Neurology  Negative neurology ROS      Psychology   Negative psychology ROS              Physical Exam    Airway    Mallampati score: II  TM Distance: >3 FB  Neck ROM: full     Dental   Comment: No loose ,     Cardiovascular  Rhythm: regular, Rate: normal,     Pulmonary  Breath sounds clear to auscultation,     Other Findings        Anesthesia Plan  ASA Score- 3     Anesthesia Type- IV sedation with anesthesia with ASA Monitors  Additional Monitors:   Airway Plan:         Plan Factors-    Induction- intravenous  Postoperative Plan-     Informed Consent- Anesthetic plan and risks discussed with patient  I personally reviewed this patient with the CRNA  Discussed and agreed on the Anesthesia Plan with the CRNA  Archie Rodas

## 2018-05-22 NOTE — H&P
History and Physical - [facility]  Nereyda Giang 68 y o  female MRN: [id number]                  HPI: Nereyda Giang is a 68y o  year old female who presents for colonoscopy for history of colon polyp  Not removed at last colonoscopy as she hadn't stopped her anticoagulation  REVIEW OF SYSTEMS: Per the HPI, and otherwise unremarkable  Historical Information   Past Medical History:   Diagnosis Date    A-fib ([address] )     Anemia     Arthritis     Breast pain, right     Chest pain on breathing     Cough     Encounter for screening colonoscopy     H/O sick sinus syndrome     Heart murmur     High cholesterol     History of atrial fibrillation     Rate ontrolled  On xarelto 15mg (creatinine 50) Followed by Dr Zhou Harmon with Cardiology     History of weight loss     Report loose fitting clothes  Weight stable (3lbs difference)  Last colo showed small tubular adenoma x2  Breast biopsy last showed fibrocystic changes  TSH wnl  Will check cbc, bmp, spep          Hx of long term use of blood thinners     Hypertension     Irregular heart beat     Pacemaker     Preop examination     Shortness of breath     Viral bronchitis      Past Surgical History:   Procedure Laterality Date    BREAST BIOPSY      Percutaneous Needle Core     CATARACT EXTRACTION W/ INTRAOCULAR LENS  IMPLANT, BILATERAL      COLONOSCOPY      INSERT / REPLACE / REMOVE PACEMAKER      LEG SURGERY Right     as a child after a fall    AR CMBND ANTERPOST COLPORRAPHY W/CYSTO N/A 3/17/2016    Procedure: COLPORRHAPHY ANTERIOR POSTERIOR ;  Surgeon: Iron Ramirez MD;  [facility];  Service: Gynecology    AR CYSTOURETHROSCOPY N/A 3/17/2016    Procedure: Mallorie Coyle;  Surgeon: Iron Ramirez MD;  [facility];  Service: Gynecology    AR ESOPHAGOGASTRODUODENOSCOPY TRANSORAL DIAGNOSTIC N/A 4/16/2018    Procedure: EGD AND COLONOSCOPY;  Surgeon: Oliva Villeda DO;  [facility]; Service: Gastroenterology    DC REVAGINAL PROLAPSE,SACROSP LIG N/A 3/17/2016    Procedure: COLPOPEXY VAGINAL EXTRAPERITONEAL (VEC) ANTERIOR ;  Surgeon: Tawnya [place] MD;  [facility];  Service: Gynecology    DC SLING OPER STRES INCONTINENCE N/A 3/17/2016    Procedure: INSERTION PUBOVAGINAL SLING SINGLE INCISION ;  Surgeon: Tawnya [place] MD;  [facility];  Service: Gynecology     Social History   History   Alcohol Use No     History   Drug Use No     History   Smoking Status    Never Smoker   Smokeless Tobacco    Never Used     Family History   Problem Relation Age of Onset    Diabetes Mother        Meds/Allergies     Facility-Administered Medications Prior to Admission   Medication    [START ON 5/30/2018] cyanocobalamin injection 1,000 mcg     Prescriptions Prior to Admission   Medication    indapamide (LOZOL) 2 5 mg tablet    nebivolol (BYSTOLIC) 5 mg tablet    olmesartan (BENICAR) 40 mg tablet    spironolactone (ALDACTONE) 25 mg tablet    verapamil (CALAN) 120 mg tablet    rivaroxaban (XARELTO) 15 mg tablet       Allergies   Allergen Reactions    Other Itching     Bandaids, tape    redness       Objective     Blood pressure 137/64, pulse 71, temperature 97 6 °F (36 4 °C), temperature source Temporal, resp  rate 18, height 5' 3" (1 6 m), weight 56 7 kg (125 lb), SpO2 98 %  PHYSICAL EXAM    Gen: NAD  CV: RRR  CHEST: Clear  ABD: soft, NT/ND  EXT: no edema      ASSESSMENT/PLAN:  This is a 68y o  year old female here for for follow up colonoscopy        PLAN: for colonoscopy for removal of known polyp

## 2018-05-30 ENCOUNTER — TELEPHONE (OUTPATIENT)
Dept: GASTROENTEROLOGY | Facility: CLINIC | Age: 78
End: 2018-05-30

## 2018-05-30 ENCOUNTER — PREP FOR PROCEDURE (OUTPATIENT)
Dept: GASTROENTEROLOGY | Facility: MEDICAL CENTER | Age: 78
End: 2018-05-30

## 2018-05-30 DIAGNOSIS — D12.6 TUBULOVILLOUS ADENOMA OF COLON: Primary | ICD-10-CM

## 2018-05-30 NOTE — TELEPHONE ENCOUNTER
I called the patient back  There was no answer  In regards to her polypectomy it was a tubulovillous adenoma  Given that I removed in piecemeal resection I do recommend she have another colonoscopy in 6 months  I will put in the order for that 1  She needs to hold her Xarelto 2 nights prior to that procedure just like she did this time to make sure I do not need to do any other work on it  Please reach out to her tomorrow to let her know

## 2018-05-30 NOTE — TELEPHONE ENCOUNTER
Dr Holland Mota pt    Please return the patients call in regards to results for her colonoscopy [phone]

## 2018-05-30 NOTE — TELEPHONE ENCOUNTER
Dr Jang,      Pt states the day of her procedure nurse at [facility] told her she needs to wait to speak with you & was told she would need to repeat colon in 6m to a yr  Pt couldn't wait to speak with you because her daughter in law had to return back to work  Pt would like results  Please let me know if she needs to have a repeat  Thanks!

## 2018-06-04 ENCOUNTER — CLINICAL SUPPORT (OUTPATIENT)
Dept: INTERNAL MEDICINE CLINIC | Facility: CLINIC | Age: 78
End: 2018-06-04
Payer: COMMERCIAL

## 2018-06-04 DIAGNOSIS — E53.8 VITAMIN B12 DEFICIENCY: Chronic | ICD-10-CM

## 2018-06-04 PROCEDURE — 96372 THER/PROPH/DIAG INJ SC/IM: CPT

## 2018-06-04 RX ADMIN — CYANOCOBALAMIN 1000 MCG: 1000 INJECTION INTRAMUSCULAR; SUBCUTANEOUS at 09:00

## 2018-06-04 NOTE — PROGRESS NOTES
PT  CAME INTO THE OFFICE FOR A NURSE VISIT FOR A VIT  B12 INJECTION  PT  BROUGHT HER OWN SERUM AND 1 0 ML WAS GIVEN IN LEFT DELTOID

## 2018-06-07 ENCOUNTER — OFFICE VISIT (OUTPATIENT)
Dept: GASTROENTEROLOGY | Facility: CLINIC | Age: 78
End: 2018-06-07
Payer: COMMERCIAL

## 2018-06-07 VITALS
DIASTOLIC BLOOD PRESSURE: 76 MMHG | TEMPERATURE: 97.4 F | WEIGHT: 133.6 LBS | HEIGHT: 63 IN | BODY MASS INDEX: 23.67 KG/M2 | HEART RATE: 73 BPM | SYSTOLIC BLOOD PRESSURE: 134 MMHG

## 2018-06-07 DIAGNOSIS — D36.9 TUBULOVILLOUS ADENOMA: Primary | ICD-10-CM

## 2018-06-07 DIAGNOSIS — I48.91 ATRIAL FIBRILLATION, UNSPECIFIED TYPE (HCC): Chronic | ICD-10-CM

## 2018-06-07 PROCEDURE — 99213 OFFICE O/P EST LOW 20 MIN: CPT | Performed by: PHYSICIAN ASSISTANT

## 2018-06-07 NOTE — LETTER
June 7, 2018     Shannon Estrada DO  [facility] [place] [address]    Patient: Jomar Rollins   YOB: 1940   Date of Visit: 6/7/2018       Dear Dr Rodas [place]:    Thank you for referring Zander Cohn to me for evaluation  Below are my notes for this consultation  If you have questions, please do not hesitate to call me  I look forward to following your patient along with you  Sincerely,        Jeannine Meyer PA-C        CC: No Recipients  Jeannine Meyer PA-C  6/7/2018 10:04 AM  Sign at close encounter  [facility] - Outpatient Follow-up Note  Jomar Rollins 68 y o  female MRN: [id number]  Encounter: [id number]          ASSESSMENT AND PLAN:      1  Tubulovillous adenoma  2  Atrial fibrillation, unspecified type ([address] )    She was recently found to have a large > 1 cm tubulovillous adenoma, which was removed piecemeal fashion  She was recommended follow-up colonoscopy in 6 months to reassess the polypectomy site  A reminder has been entered in the system  We will need to hold the Xarelto 2 days prior to colonoscopy  We will follow-up in the office after the procedure   ______________________________________________________________________    SUBJECTIVE:  66-year-old female with atrial fibrillation on Xarelto presenting for follow-up of colonoscopy  She underwent EGD and colonoscopy in April 2018  EGD was normal  Colonoscopy revealed a large > 1 cm tubulovillous adenoma which was not removed due to patient being on Xarelto  She was brought back for repeat colonoscopy in May with piecemeal removal of the polyp and placement of 2 clips to prevent bleeding  She was recommended repeat colonoscopy in 6 months  Overall she is feeling well  She denies nausea, vomiting, abdominal pain, diarrhea, constipation, melena, hematochezia  She has good appetite  She has lost weight  REVIEW OF SYSTEMS IS OTHERWISE NEGATIVE        Historical Information Past Medical History:   Diagnosis Date    A-[address] [address]     Anemia     Arthritis     Breast pain, right     Chest pain on breathing     Cough     Encounter for screening colonoscopy     H/O sick sinus syndrome     Heart murmur     High cholesterol     History of atrial fibrillation     Rate ontrolled  On xarelto 15mg (creatinine 50) Followed by Dr Selina Guaman with Cardiology     History of weight loss     Report loose fitting clothes  Weight stable (3lbs difference)  Last colo showed small tubular adenoma x2  Breast biopsy last showed fibrocystic changes  TSH wnl  Will check cbc, bmp, spep   Hx of long term use of blood thinners     Hypertension     Irregular heart beat     Pacemaker     Preop examination     Shortness of breath     Viral bronchitis      Past Surgical History:   Procedure Laterality Date    BREAST BIOPSY      Percutaneous Needle Core     CATARACT EXTRACTION W/ INTRAOCULAR LENS  IMPLANT, BILATERAL      COLONOSCOPY      COLONOSCOPY N/A 5/22/2018    Procedure: COLONOSCOPY;  Surgeon: Coty Chen DO;  [facility]; Service: Gastroenterology    INSERT / Mary Arguello / Lisandra Kumar Right     as a child after a fall    MN CMBND ANTERPOST COLPORRAPHY W/CYSTO N/A 3/17/2016    Procedure: COLPORRHAPHY ANTERIOR POSTERIOR ;  Surgeon: Gus Sheikh MD;  [facility];  Service: Gynecology    MN CYSTOURETHROSCOPY N/A 3/17/2016    Procedure: Larissa Albarado;  Surgeon: Gus Sheikh MD;  [facility];  Service: Gynecology    MN ESOPHAGOGASTRODUODENOSCOPY TRANSORAL DIAGNOSTIC N/A 4/16/2018    Procedure: EGD AND COLONOSCOPY;  Surgeon: Coty Chen DO;  [facility];   Service: Gastroenterology    MN REVAGINAL PROLAPSE,SACROSP LIG N/A 3/17/2016    Procedure: COLPOPEXY VAGINAL EXTRAPERITONEAL (VEC) ANTERIOR ;  Surgeon: Gus Sheikh MD;  [facility];  Service: Gynecology    MN SLING OPER STRES INCONTINENCE N/A 3/17/2016    Procedure: INSERTION PUBOVAGINAL SLING SINGLE INCISION ;  Surgeon: Renae Harris MD;  [facility];  Service: Gynecology     Social History   History   Alcohol Use No     History   Drug Use No     History   Smoking Status    Never Smoker   Smokeless Tobacco    Never Used     Family History   Problem Relation Age of Onset    Diabetes Mother        Meds/Allergies       Current Outpatient Prescriptions:     indapamide (LOZOL) 2 5 mg tablet    nebivolol (BYSTOLIC) 5 mg tablet    olmesartan (BENICAR) 40 mg tablet    rivaroxaban (XARELTO) 15 mg tablet    spironolactone (ALDACTONE) 25 mg tablet    verapamil (CALAN) 120 mg tablet    Current Facility-Administered Medications:     cyanocobalamin injection 1,000 mcg, 1,000 mcg, Intramuscular, Q30 Days, 1,000 mcg at 06/04/18 0900    Allergies   Allergen Reactions    Other Itching     Bandaids, tape    redness           Objective     Blood pressure 134/76, pulse 73, temperature (!) 97 4 °F (36 3 °C), temperature source Tympanic, height 5' 3" (1 6 m), weight 60 6 kg (133 lb 9 6 oz)  Body mass index is 23 67 kg/m²  PHYSICAL EXAM:      General Appearance:   Alert, cooperative, no distress   HEENT:   Normocephalic, atraumatic, anicteric      Neck:  Supple, symmetrical, trachea midline   Lungs:   Clear to auscultation bilaterally; no rales, rhonchi or wheezing; respirations unlabored    Heart[de-identified]   Regular rate and rhythm; no murmur, rub, or gallop  Abdomen:   Soft, non-tender, non-distended; normal bowel sounds; no masses, no organomegaly    Genitalia:   Deferred    Rectal:   Deferred    Extremities:  No cyanosis, clubbing or edema    Pulses:  2+ and symmetric    Skin:  No jaundice, rashes, or lesions    Lymph nodes:  No palpable cervical lymphadenopathy        Lab Results:   No visits with results within 1 Day(s) from this visit     Latest known visit with results is:   Admission on 05/22/2018, Discharged on 05/22/2018   Component Date Value    Case Report 05/22/2018                      Value:Surgical Pathology Report                         Case: [id number]                                   Authorizing Provider:  Sho Muller DO        Collected:           05/22/2018 1135              [facility]ing [facility]:     [facility]        Received:            05/22/2018 [address]                                                      Pathologist:           Beola Severin, MD                                                            Specimen:    Polyp, Colorectal, ascending polyp hot snare                                               Final Diagnosis 05/22/2018                      Value: This result contains rich text formatting which cannot be displayed here   Additional Information 05/22/2018                      Value: This result contains rich text formatting which cannot be displayed here  Bebeto Barron Description 05/22/2018                      Value: This result contains rich text formatting which cannot be displayed here  Radiology Results:   No results found

## 2018-06-07 NOTE — PROGRESS NOTES
[facility] - Outpatient Follow-up Note  Adela Bray 68 y o  female MRN: [id number]  Encounter: [id number]          ASSESSMENT AND PLAN:      1  Tubulovillous adenoma  2  Atrial fibrillation, unspecified type ([facility] [address] )    She was recently found to have a large > 1 cm tubulovillous adenoma, which was removed piecemeal fashion  She was recommended follow-up colonoscopy in 6 months to reassess the polypectomy site  A reminder has been entered in the system  We will need to hold the Xarelto 2 days prior to colonoscopy  We will follow-up in the office after the procedure   ______________________________________________________________________    SUBJECTIVE:  66-year-old female with atrial fibrillation on Xarelto presenting for follow-up of colonoscopy  She underwent EGD and colonoscopy in April 2018  EGD was normal  Colonoscopy revealed a large > 1 cm tubulovillous adenoma which was not removed due to patient being on Xarelto  She was brought back for repeat colonoscopy in May with piecemeal removal of the polyp and placement of 2 clips to prevent bleeding  She was recommended repeat colonoscopy in 6 months  Overall she is feeling well  She denies nausea, vomiting, abdominal pain, diarrhea, constipation, melena, hematochezia  She has good appetite  She has lost weight  REVIEW OF SYSTEMS IS OTHERWISE NEGATIVE  Historical Information   Past Medical History:   Diagnosis Date    A-fib ([facility] )     Anemia     Arthritis     Breast pain, right     Chest pain on breathing     Cough     Encounter for screening colonoscopy     H/O sick sinus syndrome     Heart murmur     High cholesterol     History of atrial fibrillation     Rate ontrolled  On xarelto 15mg (creatinine 50) Followed by Dr Mayuri Resendez with Cardiology     History of weight loss     Report loose fitting clothes  Weight stable (3lbs difference)  Last colo showed small tubular adenoma x2   Breast biopsy last showed fibrocystic changes  TSH wnl  Will check cbc, bmp, spep   Hx of long term use of blood thinners     Hypertension     Irregular heart beat     Pacemaker     Preop examination     Shortness of breath     Viral bronchitis      Past Surgical History:   Procedure Laterality Date    BREAST BIOPSY      Percutaneous Needle Core     CATARACT EXTRACTION W/ INTRAOCULAR LENS  IMPLANT, BILATERAL      COLONOSCOPY      COLONOSCOPY N/A 5/22/2018    Procedure: COLONOSCOPY;  Surgeon: Mona Parker DO;  [facility]; Service: Gastroenterology    INSERT / Dmitry Alvarez / Jose M Thomas Right     as a child after a fall    CO CMBND ANTERPOST COLPORRAPHY W/CYSTO N/A 3/17/2016    Procedure: COLPORRHAPHY ANTERIOR POSTERIOR ;  Surgeon: Renae Harris MD;  [facility];  Service: Gynecology    CO CYSTOURETHROSCOPY N/A 3/17/2016    Procedure: Sheryle Lehmann;  Surgeon: Renae Harris MD;  [facility];  Service: Gynecology    CO ESOPHAGOGASTRODUODENOSCOPY TRANSORAL DIAGNOSTIC N/A 4/16/2018    Procedure: EGD AND COLONOSCOPY;  Surgeon: Mona Parker DO;  [facility];   Service: Gastroenterology    CO Ova Grammes LIG N/A 3/17/2016    Procedure: COLPOPEXY VAGINAL EXTRAPERITONEAL (VEC) ANTERIOR ;  Surgeon: Renae Harris MD;  [facility];  Service: Gynecology    CO SLING OPER STRES INCONTINENCE N/A 3/17/2016    Procedure: INSERTION PUBOVAGINAL SLING SINGLE INCISION ;  Surgeon: Renae Harris MD;  [facility];  Service: Gynecology     Social History   History   Alcohol Use No     History   Drug Use No     History   Smoking Status    Never Smoker   Smokeless Tobacco    Never Used     Family History   Problem Relation Age of Onset    Diabetes Mother        Meds/Allergies       Current Outpatient Prescriptions:     indapamide (LOZOL) 2 5 mg tablet    nebivolol (BYSTOLIC) 5 mg tablet    olmesartan (BENICAR) 40 mg tablet   rivaroxaban (XARELTO) 15 mg tablet    spironolactone (ALDACTONE) 25 mg tablet    verapamil (CALAN) 120 mg tablet    Current Facility-Administered Medications:     cyanocobalamin injection 1,000 mcg, 1,000 mcg, Intramuscular, Q30 Days, 1,000 mcg at 06/04/18 0900    Allergies   Allergen Reactions    Other Itching     Bandaids, tape    redness           Objective     Blood pressure 134/76, pulse 73, temperature (!) 97 4 °F (36 3 °C), temperature source Tympanic, height 5' 3" (1 6 m), weight 60 6 kg (133 lb 9 6 oz)  Body mass index is 23 67 kg/m²  PHYSICAL EXAM:      General Appearance:   Alert, cooperative, no distress   HEENT:   Normocephalic, atraumatic, anicteric      Neck:  Supple, symmetrical, trachea midline   Lungs:   Clear to auscultation bilaterally; no rales, rhonchi or wheezing; respirations unlabored    Heart[de-identified]   Regular rate and rhythm; no murmur, rub, or gallop  Abdomen:   Soft, non-tender, non-distended; normal bowel sounds; no masses, no organomegaly    Genitalia:   Deferred    Rectal:   Deferred    Extremities:  No cyanosis, clubbing or edema    Pulses:  2+ and symmetric    Skin:  No jaundice, rashes, or lesions    Lymph nodes:  No palpable cervical lymphadenopathy        Lab Results:   No visits with results within 1 Day(s) from this visit     Latest known visit with results is:   Admission on 05/22/2018, Discharged on 05/22/2018   Component Date Value    Case Report 05/22/2018                      Value:Surgical Pathology Report                         Case: [id number]                                   Authorizing Provider:  Jerry Allison DO        Collected:           05/22/2018 1135              Ordering Location:     [facility]        Received:            05/22/2018 [address] Pathologist:           Ernie Whitfield MD                                                            Specimen:    Polyp, Colorectal, ascending polyp hot snare                                               Final Diagnosis 05/22/2018                      Value: This result contains rich text formatting which cannot be displayed here   Additional Information 05/22/2018                      Value: This result contains rich text formatting which cannot be displayed here  Geryl Massa Description 05/22/2018                      Value: This result contains rich text formatting which cannot be displayed here  Radiology Results:   No results found

## 2018-06-08 DIAGNOSIS — Q66.70 CAVUS DEFORMITY OF FOOT: Primary | ICD-10-CM

## 2018-06-12 ENCOUNTER — TELEPHONE (OUTPATIENT)
Dept: INTERNAL MEDICINE CLINIC | Facility: CLINIC | Age: 78
End: 2018-06-12

## 2018-06-12 ENCOUNTER — OFFICE VISIT (OUTPATIENT)
Dept: MULTI SPECIALTY CLINIC | Facility: CLINIC | Age: 78
End: 2018-06-12
Payer: COMMERCIAL

## 2018-06-12 VITALS
DIASTOLIC BLOOD PRESSURE: 50 MMHG | HEART RATE: 80 BPM | WEIGHT: 134.48 LBS | HEIGHT: 62 IN | BODY MASS INDEX: 24.75 KG/M2 | TEMPERATURE: 97.5 F | SYSTOLIC BLOOD PRESSURE: 110 MMHG

## 2018-06-12 DIAGNOSIS — B35.1 PAIN DUE TO ONYCHOMYCOSIS OF TOENAILS OF BOTH FEET: Primary | ICD-10-CM

## 2018-06-12 DIAGNOSIS — M79.675 PAIN DUE TO ONYCHOMYCOSIS OF TOENAILS OF BOTH FEET: Primary | ICD-10-CM

## 2018-06-12 DIAGNOSIS — M79.674 PAIN DUE TO ONYCHOMYCOSIS OF TOENAILS OF BOTH FEET: Primary | ICD-10-CM

## 2018-06-12 PROCEDURE — 99213 OFFICE O/P EST LOW 20 MIN: CPT | Performed by: PODIATRIST

## 2018-06-12 NOTE — PROGRESS NOTES
Routine Foot Care- Podiatry   Saad Stoll 68 y o  female MRN: [id number]  Encounter: [id number]    Assessment/Plan     Assessment:  1  Onychomycosis      Plan:  - Patient was seen/examined  All questions and concerns addressed  - Nails x10 were sharply trimmed to normal length and thickness with a large nail nipper without incident   -Stressed the importance of  shoe gear, and proper  foot care  - discussed blisters may be from improper shoes that are rubbing against arch  Pt demonstrates understanding and will try to change shoes  - RTC in prn if she gets blisters      History of Present Illness     HPI:  Saad Stoll is a 68 y o  female who presents with elongated toenails  States that their nails are painful, elongated  They have difficulty applying their socks and shoes  The pressure within their shoe gear is painful and they have been unable to cut their nails adequately especially with her arthritis in her had  Patient denies numbness/tingling  Pt also complains that she had some serous blisters in the left plantar foot which have since resolved but was worried about  The patient denies any nausea, vomiting, fever, chills, shortness of breath, or chest pains  Consults  Review of Systems   Constitutional: Negative  HENT: Negative  Eyes: Negative  Respiratory: Negative  Cardiovascular: Negative  Gastrointestinal: Negative  Musculoskeletal: Negative   Skin: elongated thickened toenails  Neurological: Negative  Historical Information   Past Medical History:   Diagnosis Date    A-fib ([address] )     Anemia     Arthritis     Breast pain, right     Chest pain on breathing     Cough     Encounter for screening colonoscopy     H/O sick sinus syndrome     Heart murmur     High cholesterol     History of atrial fibrillation     Rate ontrolled  On xarelto 15mg (creatinine 50) Followed by Dr Betty Cardoza with Cardiology     History of weight loss     Report loose fitting clothes  Weight stable (3lbs difference)  Last colo showed small tubular adenoma x2  Breast biopsy last showed fibrocystic changes  TSH wnl  Will check cbc, bmp, spep   Hx of long term use of blood thinners     Hypertension     Irregular heart beat     Pacemaker     Preop examination     Shortness of breath     Viral bronchitis      Past Surgical History:   Procedure Laterality Date    BREAST BIOPSY      Percutaneous Needle Core     CATARACT EXTRACTION W/ INTRAOCULAR LENS  IMPLANT, BILATERAL      COLONOSCOPY      COLONOSCOPY N/A 5/22/2018    Procedure: COLONOSCOPY;  Surgeon: Phil Partida DO;  [facility]; Service: Gastroenterology    INSERT / Yohannes Dom / Malinda Left Right     as a child after a fall    SD CMBND ANTERPOST COLPORRAPHY W/CYSTO N/A 3/17/2016    Procedure: COLPORRHAPHY ANTERIOR POSTERIOR ;  Surgeon: Lenora Bonilla MD;  [facility];  Service: Gynecology    SD CYSTOURETHROSCOPY N/A 3/17/2016    Procedure: Evone Guayama;  Surgeon: Lenora Bonilla MD;  [facility];  Service: Gynecology    SD ESOPHAGOGASTRODUODENOSCOPY TRANSORAL DIAGNOSTIC N/A 4/16/2018    Procedure: EGD AND COLONOSCOPY;  Surgeon: Phil Partida DO;  [facility];   Service: Gastroenterology    SD REVAGINAL PROLAPSE,SACROSP LIG N/A 3/17/2016    Procedure: COLPOPEXY VAGINAL EXTRAPERITONEAL (VEC) ANTERIOR ;  Surgeon: Lenora Bonilla MD;  [facility];  Service: Gynecology    SD SLING OPER STRES INCONTINENCE N/A 3/17/2016    Procedure: INSERTION PUBOVAGINAL SLING SINGLE INCISION ;  Surgeon: Lenora Bonilla MD;  [facility];  Service: Gynecology     Social History   History   Alcohol Use No     History   Drug Use No     History   Smoking Status    Never Smoker   Smokeless Tobacco    Never Used     Family History:   Family History   Problem Relation Age of Onset    Diabetes Mother        Meds/Allergies     (Not in a hospital admission)  Allergies   Allergen Reactions    Other Itching     Bandaids, tape    redness       Objective     Current Vitals:   Blood Pressure: 110/50 (06/12/18 0858)  Pulse: 80 (06/12/18 0858)  Temperature: 97 5 °F (36 4 °C) (06/12/18 0858)  Temp Source: Oral (06/12/18 0858)  Height: 5' 1 5" (156 2 cm) (06/12/18 0858)  Weight - Scale: 61 kg (134 lb 7 7 oz) (06/12/18 0858)        /50 (BP Location: Right arm, Patient Position: Sitting, Cuff Size: Standard)   Pulse 80   Temp 97 5 °F (36 4 °C) (Oral)   Ht 5' 1 5" (1 562 m)   Wt 61 kg (134 lb 7 7 oz)   BMI 25 00 kg/m²       Lower Extremity Exam:    Musculoskeletal:  MMT is 5/5 to all compartments of the LE, +0/4 edema B/L, Digital ROM is intact,      Pulses:   R DP is +2/4, R PT is +2/4, L DP is +2/4, L PT is +2/4, CFT< 3sec to all digits  Pedal hair is Present     Skin:   Nails are thickened, elongated, TTP with notable subungual debris  No open Lesions  Skin of the LE is normal texture, turgor  No ID maceration  No clinical signs of infection       Neurologic:  Gross sensation is intact   Protective sensation is Intact

## 2018-06-12 NOTE — TELEPHONE ENCOUNTER
Patient would like to be referred to an Orthopedic for her hands  She continues with the hand pain  She states she has discussed this with you in the past  Can you refer her?

## 2018-06-13 DIAGNOSIS — M19.031 PRIMARY OSTEOARTHRITIS OF BOTH WRISTS: Primary | ICD-10-CM

## 2018-06-13 DIAGNOSIS — M19.032 PRIMARY OSTEOARTHRITIS OF BOTH WRISTS: Primary | ICD-10-CM

## 2018-06-13 RX ORDER — SENNOSIDES 8.6 MG
650 CAPSULE ORAL EVERY 8 HOURS PRN
Qty: 90 TABLET | Refills: 0 | Status: SHIPPED | OUTPATIENT
Start: 2018-06-13 | End: 2018-09-05 | Stop reason: ALTCHOICE

## 2018-06-14 NOTE — TELEPHONE ENCOUNTER
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

## 2018-06-14 NOTE — TELEPHONE ENCOUNTER
CALLED AND SPOKE TO PATIENT  PATIENT UNDERSTOOD AND WILL TAKE TYLENOL AND ADDRESS THE ISSUE AT HER APPT IN [facility]

## 2018-07-09 ENCOUNTER — REMOTE DEVICE CLINIC VISIT (OUTPATIENT)
Dept: CARDIOLOGY CLINIC | Facility: CLINIC | Age: 78
End: 2018-07-09
Payer: COMMERCIAL

## 2018-07-09 DIAGNOSIS — I49.5 SSS (SICK SINUS SYNDROME) (HCC): Primary | ICD-10-CM

## 2018-07-09 DIAGNOSIS — R00.1 BRADYCARDIA: ICD-10-CM

## 2018-07-09 DIAGNOSIS — Z95.0 PRESENCE OF CARDIAC PACEMAKER: ICD-10-CM

## 2018-07-09 PROCEDURE — 93294 REM INTERROG EVL PM/LDLS PM: CPT | Performed by: INTERNAL MEDICINE

## 2018-07-09 PROCEDURE — 93296 REM INTERROG EVL PM/IDS: CPT | Performed by: INTERNAL MEDICINE

## 2018-07-09 NOTE — PROGRESS NOTES
Results for orders placed or performed in visit on 07/09/18   Cardiac EP device report    Narrative    MDT VVI PPM*MY CARELINK*  CARELINK TRANSMISSION:  BATTERY VOLTAGE ADEQUATE (14 YRS)   - 55 1% (>40%/VVIR 60)  ALL AVAILABLE LEAD PARAMETERS WITHIN NORMAL LIMITS  NO HIGH RATE EPISODES  NORMAL DEVICE FUNCTION    CH/EB

## 2018-07-10 ENCOUNTER — OFFICE VISIT (OUTPATIENT)
Dept: INTERNAL MEDICINE CLINIC | Facility: CLINIC | Age: 78
End: 2018-07-10
Payer: COMMERCIAL

## 2018-07-10 VITALS
SYSTOLIC BLOOD PRESSURE: 118 MMHG | HEART RATE: 76 BPM | DIASTOLIC BLOOD PRESSURE: 60 MMHG | HEIGHT: 62 IN | BODY MASS INDEX: 24.67 KG/M2 | TEMPERATURE: 97.5 F | WEIGHT: 134.04 LBS

## 2018-07-10 DIAGNOSIS — Z12.39 SCREENING FOR BREAST CANCER: ICD-10-CM

## 2018-07-10 DIAGNOSIS — M85.80 OSTEOPENIA, UNSPECIFIED LOCATION: ICD-10-CM

## 2018-07-10 DIAGNOSIS — M19.032 PRIMARY OSTEOARTHRITIS OF BOTH WRISTS: Primary | ICD-10-CM

## 2018-07-10 DIAGNOSIS — E53.8 VITAMIN B12 DEFICIENCY: Chronic | ICD-10-CM

## 2018-07-10 DIAGNOSIS — M19.031 PRIMARY OSTEOARTHRITIS OF BOTH WRISTS: Primary | ICD-10-CM

## 2018-07-10 PROCEDURE — 99213 OFFICE O/P EST LOW 20 MIN: CPT | Performed by: INTERNAL MEDICINE

## 2018-07-10 PROCEDURE — 96372 THER/PROPH/DIAG INJ SC/IM: CPT | Performed by: INTERNAL MEDICINE

## 2018-07-10 RX ADMIN — CYANOCOBALAMIN 1000 MCG: 1000 INJECTION INTRAMUSCULAR; SUBCUTANEOUS at 09:13

## 2018-07-10 NOTE — PATIENT INSTRUCTIONS
-script given for Voltaren gel  Can apply this to hands up to 4 times a day  Can take this in addition to Tylenol   - Can also try physical therapy for hand-strengthening exercises  - would also recommend buying opener to assist with opening jars  This can be bought over-the-counter   - Follow up in 6 months for colonoscopy  Hold xarelto at least 2 days prior to procedure  - recommend taking vitamin D at least 2000 units a day with food  Can buy over the counter  Osteoartritis   CUIDADO AMBULATORIO:   Osteoartritis  ocurre cuando el cartílago (tejido que amortigua wilver articulación) se desgasta lentamente y causa que los huesos rocen entre ellos  La osteoartritis es wilver condición que por lo general afecta las neha, gideon, lumbago, rodillas y caderas  La osteoartritis también se conoce maye artrosis o enfermedad degenerativa de las articulaciones  Los signos y síntomas más comunes incluyen los siguientes:   · Dolor en la articulación que empeora cuando usted mueve la articulación     · Rigidez en la articulación que disminuye después que usted mueve la articulación     · Disminución del rango de movimiento     · Un crecimiento addis de los huesos de los dedos de las neha y pies    · Un jesús de raspado o un crujido cuando usted mueve addison articulación  Busque atención médica de inmediato si:   · Usted tiene dolor intenso  · Usted no puede  la articulación  Pregúntele a addison Emiliana Clayman vitaminas y minerales son adecuados para usted  · Usted tiene fiebre  · Addison articulación Korina Kida y sensible  · Usted tiene preguntas o inquietudes acerca de addison condición o cuidado  El tratamiento para la osteoartritis  podría incluir cualquiera de los siguientes:  · El acetaminofén  sirve para reducir el dolor  Está disponible sin receta médica  Pregunte la cantidad y la frecuencia con que debe tomarlos  [id number]   El acetaminofén puede causar daño en el hígado cuando no se caesar de forma correcta  · AINEs (Analgésicos antiinflamatorios no esteroides) maye el ibuprofeno, ayudan a disminuir la inflamación, el dolor y la [place]  Jennifer medicamento esta disponible con o sin ally receta médica  Los AINEs pueden causar sangrado estomacal o problemas renales en ciertas personas  Si usted caesar un medicamento anticoagulante, siempre pregúntele a addison médico si los EMANUEL son seguros para usted  Siempre kerry la etiqueta de jennifer medicamento y [place] instrucciones  · La crema de capsaicina  puede ayudar a disminuir el dolor en addison articulación  · Un medicamento con receta para el dolor  podría administrarse para disminuir el dolor intenso si otros medicamentos no funcionan  [place] el medicamento maye se le indique  No espere a que el dolor sea muy intenso para felipe el medicamento  · Ally inyección de esteroides  puede darse si ben síntomas empeoran  · Fisioterapia  se [place] para enseñarle a hacer ejercicios que ayudan a mejorar la movilidad y la fuerza con el fin de disminuir el dolor  · Cirugía  podría usarse si otros tratamientos no funcionan  Controle la osteoartritis   · [employer]  La actividad física puede reducir el dolor y mejorar addison habilidad de hacer ben actividades diarias  Evite actividades que le causen Jana Bernardino  Pregúntele a addison médico qué tipos de ejercicios son los adecuados para usted  · Mantenga un peso saludable  [place] ayuda a disminuir la presión en las articulaciones en addison espalda, caderas, rodillas, tobillos y pies  Consulte con addison médico cuánto debería pesar  Pida que le ayude a crear un plan para bajar de peso si usted tiene sobrepeso  · Aplique calor o hielo  en las articulaciones [address] indicaciones  El calor o el hielo pueden ayudar a disminuir el dolor, la inflamación y los espasmos musculares  Use ally almohadilla de calor eléctrica en temperatura baja o tome ally ducha tibia  Use un paquete de hielo o ponga hielo molido dentro de [employer]   Jorge Cardona con [place]  · Dése un masaje  en los músculos alrededor de la articulación para aliviar el dolor y la rigidez  · Use un bastón, unas muletas o wilver caminador  para proteger y aliviar la presión en roach Teola Carwin y articulaciones de la cadera  También le pueden ordenar plantillas ortopédicas para ben zapatos para disminuir la presión de ben articulaciones  · Use zapatos sin tacones o de tacones bajos  [place] le servirá para aliviar el dolor y a disminuir la presión que se ejerce en las articulaciones de roach Teola Carwin y caderas  Acuda a ben consultas de control con roach médico según le indicaron  Anote ben preguntas para que se acuerde de hacerlas karly ben visitas  © 2017 [address] Information is for End User's use only and may not be sold, redistributed or otherwise used for commercial purposes  All illustrations and images included in CareNotes® are the copyrighted property of A D A M , Inc  or Bishop Zacarias  Esta información es sólo para uso en educación  [place] intención no es darle un consejo médico sobre enfermedades o tratamientos  Colsulte con roach [employer] farmacéutico antes de seguir cualquier régimen médico para saber si es seguro y efectivo para usted

## 2018-07-10 NOTE — PROGRESS NOTES
Assessment/Plan:   Diagnoses and all orders for this visit:    Primary osteoarthritis of both wrists  -     Ambulatory referral to Physical Therapy; Future  -     diclofenac sodium (VOLTAREN) 1 %; Apply 2 g topically 4 (four) times a day Apply to hands  - Can continue tylenol every 8 hours prn  - can also use an assistive device toe open jars  This can be bought over-the-counter  Vitamin B12 deficiency  - injection given in clinic    Screening for breast cancer  -     Mammo screening bilateral w cad; Future    Osteopenia, unspecified location    - No need for repeat DEXA scan at this point   - would advise continuing adequate calcium and vitamin-D intake and exercise  Recommend at least 2000 units of vitamin D daily, preferably taken with food  Subjective:      Patient ID: Naye Gaspar is a 68 y o  female  Pt here for follow up of bilateral hand osteoarthritis  Was formally seen in the clinic for osteoarthritis which she says is mainly limited to both of her thumbs  Due to questionable history and family history of similar issue, rheumatoid factor and anti CCP antibodies were ordered, which were both negative  Patient currently says the pain is better and the tylenol is helping  Currently rates her pain at about 6-7 currently  She is satisfied with the medicine however she says that she still has problems opening jars and that she still has problems doing some of her activities as resolve the pain  She is hesitant to take more medication as she does not like to take a lot of pills  However, she denies side effects from the Tylenol  She has no other complaints today  Of note, patient recently had a colonoscopy in which case a >1cm tubulovillous adenoma was removed  Patient was recommended to follow up in 6 months for a repeat colonoscopy to assess the polypectomy site  She also had at question regarding a script for a DEXA scan as it has been awhile since she had one    She last had a DEXA scan in December 2016 which showed low bone mineral density  The following portions of the patient's history were reviewed and updated as appropriate: current medications and problem list     Review of Systems   Musculoskeletal: Positive for arthralgias  Objective:  /60 (BP Location: Right arm, Patient Position: Sitting, Cuff Size: Adult)   Pulse 76   Temp 97 5 °F (36 4 °C) (Oral)   Ht 5' 2" (1 575 m)   Wt 60 8 kg (134 lb 0 6 oz)   BMI 24 52 kg/m²      Physical Exam   Constitutional: She appears well-developed and well-nourished  No distress  HENT:   Head: Normocephalic and atraumatic  Cardiovascular: Normal rate, regular rhythm, normal heart sounds and intact distal pulses  Pulmonary/Chest: Effort normal and breath sounds normal    Musculoskeletal: She exhibits no edema  Right hand: She exhibits normal range of motion and no tenderness  Left hand: She exhibits normal range of motion and no tenderness  Bilateral Heberden'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

## 2018-08-13 ENCOUNTER — CLINICAL SUPPORT (OUTPATIENT)
Dept: INTERNAL MEDICINE CLINIC | Facility: CLINIC | Age: 78
End: 2018-08-13
Payer: COMMERCIAL

## 2018-08-13 DIAGNOSIS — E53.8 VITAMIN B12 DEFICIENCY: Chronic | ICD-10-CM

## 2018-08-13 PROCEDURE — 96372 THER/PROPH/DIAG INJ SC/IM: CPT

## 2018-08-13 RX ADMIN — CYANOCOBALAMIN 1000 MCG: 1000 INJECTION INTRAMUSCULAR; SUBCUTANEOUS at 08:39

## 2018-08-29 ENCOUNTER — TELEPHONE (OUTPATIENT)
Dept: GASTROENTEROLOGY | Facility: CLINIC | Age: 78
End: 2018-08-29

## 2018-08-29 PROBLEM — D12.6 TUBULOVILLOUS ADENOMA OF COLON: Status: ACTIVE | Noted: 2018-08-29

## 2018-08-29 PROBLEM — D36.9 TUBULAR ADENOMA: Status: ACTIVE | Noted: 2018-08-29

## 2018-09-05 ENCOUNTER — OFFICE VISIT (OUTPATIENT)
Dept: CARDIOLOGY CLINIC | Facility: CLINIC | Age: 78
End: 2018-09-05
Payer: COMMERCIAL

## 2018-09-05 VITALS
BODY MASS INDEX: 24.51 KG/M2 | HEART RATE: 64 BPM | DIASTOLIC BLOOD PRESSURE: 70 MMHG | WEIGHT: 133.2 LBS | SYSTOLIC BLOOD PRESSURE: 124 MMHG | HEIGHT: 62 IN

## 2018-09-05 DIAGNOSIS — I48.21 PERMANENT ATRIAL FIBRILLATION (HCC): Primary | ICD-10-CM

## 2018-09-05 DIAGNOSIS — I10 ESSENTIAL HYPERTENSION: ICD-10-CM

## 2018-09-05 PROCEDURE — 93000 ELECTROCARDIOGRAM COMPLETE: CPT | Performed by: INTERNAL MEDICINE

## 2018-09-05 PROCEDURE — 99213 OFFICE O/P EST LOW 20 MIN: CPT | Performed by: INTERNAL MEDICINE

## 2018-09-05 RX ORDER — LOSARTAN POTASSIUM 100 MG/1
100 TABLET ORAL DAILY
Qty: 90 TABLET | Refills: 3 | Status: SHIPPED | OUTPATIENT
Start: 2018-09-05 | End: 2019-01-04 | Stop reason: SDUPTHER

## 2018-09-05 NOTE — PROGRESS NOTES
HEART AND VASCULAR  CARDIAC [address]    New Consult  Today's Date: 09/05/18        Patient name: Delvis Ramos  YOB: 1940  Sex: female         Chief Complaint:  Afib, pacemaker battery depletion      ASSESSMENT:  Problem List Items Addressed This Visit     Essential hypertension (Chronic)    Relevant Medications    losartan (COZAAR) 100 MG tablet    Atrial fibrillation (HCC) - Primary (Chronic)    Relevant Orders    POCT ECG        65 yo female  1  Permanent afib rates controlled on xarelto [id number]    2  tachy tasha w s/p gen change right sided this year  She had left sided erosion remotely and lead extracted    3  EF normal and stress normal Kumar Nuclear stress    4  HTN reasonable control but needs to change from olmesartan due to insuranc eplan    5 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    6  CKD CR 1 0 on last BMP    DISCUSSION AND PLAN:    1  Planned colonoscopy in Dec, ok to proceed, low risk to hold xarelto 2 days  Orders Placed This Encounter   Procedures    POCT ECG     Medications Discontinued During This Encounter   Medication Reason    acetaminophen (TYLENOL) 650 mg CR tablet Discontinued by another clinician    olmesartan (BENICAR) 40 mg tablet        Follow up in: 6 months        HPI:   65 yo female  1  Permanent afib rates controlled on xarelto [id number]    2  tachy tasha w s/p gen change right sided this year  She had left sided erosion remotely and lead extracted    3  EF normal and stress normal Kumar Nuclear stress    4  HTN reasonable control but needs to change from olmesartan due to insuranc eplan    5 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    6  CKD CR 1 0 on last BMP    She has been seeing DR Javier Merrill for >20 years  Transfering care for convenience of proximity   Feels tired today but no other specific complaints      Please note HPI is listed by problem with with update following it, it is copied again in the assessment above and reflects medical decision making as well  Complete 12 point ROS reviewed and otherwise non pertinent or negative except as per HPI pertinent positives in Cardiovascular and Respiratory emphasized  Please see paper chart for outpatient clinic patients where the patient completed the 12 point ROS survey  Past Medical History:   Diagnosis Date    A-fib ([address] )     Anemia     Arthritis     Breast pain, right     Chest pain on breathing     Cough     Encounter for screening colonoscopy     H/O sick sinus syndrome     Heart murmur     High cholesterol     History of atrial fibrillation     Rate ontrolled  On xarelto 15mg (creatinine 50) Followed by Dr Hue Lockwood with Cardiology     History of weight loss     Report loose fitting clothes  Weight stable (3lbs difference)  Last colo showed small tubular adenoma x2  Breast biopsy last showed fibrocystic changes  TSH wnl  Will check cbc, bmp, spep   Hx of long term use of blood thinners     Hypertension     Irregular heart beat     Pacemaker     Preop examination     Shortness of breath     Viral bronchitis        Allergies   Allergen Reactions    Other Itching     Bandaids    Tape [Medical Tape] Itching     I reviewed the Home Medication list and Allergies in the chart     Scheduled Meds:  Current Outpatient Prescriptions   Medication Sig Dispense Refill    diclofenac sodium (VOLTAREN) 1 % Apply 2 g topically 4 (four) times a day Apply to hands 1 Tube 2    indapamide (LOZOL) 2 5 mg tablet Take 1 tablet (2 5 mg total) by mouth every morning 90 tablet 3    nebivolol (BYSTOLIC) 5 mg tablet Take 1 tablet (5 mg total) by mouth daily 90 tablet 3    rivaroxaban (XARELTO) 15 mg tablet Take 1 tablet (15 mg total) by mouth daily 90 tablet 2    spironolactone (ALDACTONE) 25 mg tablet Take 1 tablet (25 mg total) by mouth daily 90 tablet 3    verapamil (CALAN) 120 mg tablet Take 1 tablet (120 mg total) by mouth 2 (two) times a day 180 tablet 3    losartan (COZAAR) 100 MG tablet Take 1 tablet (100 mg total) by mouth daily 90 tablet 3     Current Facility-Administered Medications   Medication Dose Route Frequency Provider Last Rate Last Dose    cyanocobalamin injection 1,000 mcg  1,000 mcg Intramuscular Q30 Days Eva Jacobson DO   1,000 mcg at 08/13/18 0839     PRN Meds:         Family History   Problem Relation Age of Onset    Diabetes Mother        Social History     Social History    Marital status:      Spouse name: N/A    Number of children: N/A    Years of education: N/A     Occupational History    Not on file  Social History Main Topics    Smoking status: Never Smoker    Smokeless tobacco: Never Used    Alcohol use No    Drug use: No    Sexual activity: No      Comment:      Other Topics Concern    Not on file     Social History Narrative    Housing, household, and economic circumstances          OBJECTIVE:    /70 (BP Location: Right arm, Patient Position: Sitting, Cuff Size: Adult)   Pulse 64   Ht 5' 2" (1 575 m)   Wt 60 4 kg (133 lb 3 2 oz)   BMI 24 36 kg/m²   Vitals:    09/05/18 0902   Weight: 60 4 kg (133 lb 3 2 oz)     GEN: No acute distress, Alert and oriented, well appearing  HEENT:Head, neck, ears, oral pharynx: Mucus membranes moist, oral pharynx clear, nares clear  External ears normal  EYES: Pupils equal, sclera anicteric, midline, normal conjuctiva  NECK: No JVD, supple, no obvious masses or thryomegaly or goiter  CARDIOVASCULAR:  RRR, 1/6 KAN, rub, gallops S1,S2  LUNGS: Clear To auscultation bilaterally, normal effort, no rales, rhonchi, crackles  ABDOMEN:  nondistended,  without obvious organomegaly or ascites  EXTREMITIES/VASCULAR:  No edema  Radial pulses intact, pedal pulses difficult to palpate, warm an well perfused    PSYCH: Normal Affect, no overt suicidal ideation, linear speech pattern without evidence of psychosis  NEURO: Grossly intact, moving all extremiteis equal, face symmetric, alert and responsive, no obvious focal defecits  GAIT:  Ambulates normally without difficulty  HEME: No bleeding, bruising, petechia, purpura  SKIN: No significant rashes, warm, no diaphoresis or pallor    Chest; Right sided pacemaker site well healed     Lab Results:     @RESULTRCNT(1M])@    CBC with diff:     CMP:    TSH:       Lipid Profile:             I reviewed and interpreted the following LABS/EKG/TELE/IMAGING and below is summary of my interpretation (if data available):    LABS:CR 1 01    Current EKG and Rhythm Strip:Afib w V pacing    Past EKGs and RHYTHM strip: Kumar EKG Afib w V pacing      CXR: right sided pacemaker single chamber

## 2018-09-11 ENCOUNTER — OFFICE VISIT (OUTPATIENT)
Dept: MULTI SPECIALTY CLINIC | Facility: CLINIC | Age: 78
End: 2018-09-11
Payer: COMMERCIAL

## 2018-09-11 VITALS
TEMPERATURE: 98.1 F | BODY MASS INDEX: 24.59 KG/M2 | HEART RATE: 84 BPM | DIASTOLIC BLOOD PRESSURE: 60 MMHG | HEIGHT: 62 IN | SYSTOLIC BLOOD PRESSURE: 122 MMHG | WEIGHT: 133.6 LBS

## 2018-09-11 DIAGNOSIS — D36.9 TUBULOVILLOUS ADENOMA: Primary | ICD-10-CM

## 2018-09-11 DIAGNOSIS — B35.1 ONYCHOMYCOSIS: Primary | ICD-10-CM

## 2018-09-11 DIAGNOSIS — M79.675 PAIN OF TOE OF LEFT FOOT: ICD-10-CM

## 2018-09-11 PROCEDURE — 99212 OFFICE O/P EST SF 10 MIN: CPT | Performed by: PODIATRIST

## 2018-09-11 PROCEDURE — 11719 TRIM NAIL(S) ANY NUMBER: CPT | Performed by: PODIATRIST

## 2018-09-11 PROCEDURE — 4040F PNEUMOC VAC/ADMIN/RCVD: CPT | Performed by: PODIATRIST

## 2018-09-11 NOTE — PROGRESS NOTES
Podiatry Clinic Visit  Ana Martinez 66 y o  female MRN: [id number]  Encounter: [id number]    Assessment/Plan     Assessment:  1  Left hallux toe pain  2  Onychomycosis    Plan:  - Patient was seen/examined  All questions and concerns addressed  - Nails x10 were sharply trimmed to normal length and thickness with a large nail nipper without incident   - Right 3rd subungual hematoma discoloration noted secondary to microtrauma from shoes  Advised to wear wider toebox shoes    - Stressed the importance of shoe gear, and proper foot care  - RTC in 3 months      History of Present Illness     HPI:  Ana Martinez is a 66 y o  female who presents with painful left hallux who states its from the fungus underneath the toenail  Patient also presents with discolored right 3rd toe which she does not remember ant trauma to the digit  Patient states she has had the discolored nail since few months now and its not painful  Patient denies any other complaints related to the feet  The patient denies any nausea, vomiting, fever, chills, shortness of breath, or chest pains  Review of Systems   Constitutional: Negative  HENT: Negative  Eyes: Negative  Respiratory: Negative  Cardiovascular: Negative  Gastrointestinal: Negative  Musculoskeletal: Negative   Skin: elongated thickened toenails   Neurological: Negative  Historical Information   Past Medical History:   Diagnosis Date    A-fib ([address] )     Anemia     Arthritis     Breast pain, right     Chest pain on breathing     Cough     Encounter for screening colonoscopy     H/O sick sinus syndrome     Heart murmur     High cholesterol     History of atrial fibrillation     Rate ontrolled  On xarelto 15mg (creatinine 50) Followed by Dr Lg Hardwick with Cardiology     History of weight loss     Report loose fitting clothes  Weight stable (3lbs difference)  Last colo showed small tubular adenoma x2  Breast biopsy last showed fibrocystic changes  TSH wnl  Will check cbc, bmp, spep   Hx of long term use of blood thinners     Hypertension     Irregular heart beat     Pacemaker     Preop examination     Shortness of breath     Viral bronchitis      Past Surgical History:   Procedure Laterality Date    BREAST BIOPSY      Percutaneous Needle Core     CATARACT EXTRACTION W/ INTRAOCULAR LENS  IMPLANT, BILATERAL      COLONOSCOPY      COLONOSCOPY N/A 5/22/2018    Procedure: COLONOSCOPY;  Surgeon: Jerry Allison DO;  [facility]; Service: Gastroenterology    INSERT / Duana Livers / Wilkes Sharper Right     as a child after a fall    KS CMBND ANTERPOST COLPORRAPHY W/CYSTO N/A 3/17/2016    Procedure: COLPORRHAPHY ANTERIOR POSTERIOR ;  Surgeon: Sara Echeverria MD;  [facility];  Service: Gynecology    KS CYSTOURETHROSCOPY N/A 3/17/2016    Procedure: Tarry Fontan;  Surgeon: Sara Echeverria MD;  [facility];  Service: Gynecology    KS ESOPHAGOGASTRODUODENOSCOPY TRANSORAL DIAGNOSTIC N/A 4/16/2018    Procedure: EGD AND COLONOSCOPY;  Surgeon: Jerry Allison DO;  [facility];   Service: Gastroenterology    KS REVAGINAL PROLAPSE,SACROSP LIG N/A 3/17/2016    Procedure: COLPOPEXY VAGINAL EXTRAPERITONEAL (VEC) ANTERIOR ;  Surgeon: Sara Echeverria MD;  [facility];  Service: Gynecology    KS SLING OPER STRES INCONTINENCE N/A 3/17/2016    Procedure: INSERTION PUBOVAGINAL SLING SINGLE INCISION ;  Surgeon: Sara Echeverria MD;  [facility];  Service: Gynecology     Social History   History   Alcohol Use No     History   Drug Use No     History   Smoking Status    Never Smoker   Smokeless Tobacco    Never Used     Family History:   Family History   Problem Relation Age of Onset    Diabetes Mother        Meds/Allergies     (Not in a hospital admission)  Allergies   Allergen Reactions    Other Itching     Bandaids    Tape [Medical Tape] Itching       Objective Current Vitals:   Blood Pressure: 122/60 (09/11/18 1004)  Pulse: 84 (09/11/18 1004)  Temperature: 98 1 °F (36 7 °C) (09/11/18 1004)  Temp Source: Oral (09/11/18 1004)  Height: 5' 2" (157 5 cm) (09/11/18 1004)  Weight - Scale: 60 6 kg (133 lb 9 6 oz) (09/11/18 1004)        /60 (BP Location: Left arm, Patient Position: Sitting, Cuff Size: Adult)   Pulse 84   Temp 98 1 °F (36 7 °C) (Oral)   Ht 5' 2" (1 575 m)   Wt 60 6 kg (133 lb 9 6 oz)   BMI 24 44 kg/m²       Lower Extremity Exam:    Musculoskeletal:  MMT is 5/5 to all compartments of the LE, +2/4 edema B/L foot, Digital ROM is intact,      Pulses:   R DP is +0/4, R PT is +0/4, L DP is +0/4, L PT is +0/4, CFT< 3sec to all digits  B/l pulses are dopplerable signals are biphasic PT and DP b/l feet  Pedal hair is Absent     Skin:  Nails are thickened, elongated, TTP with notable subungual debris  No interdigital maceration noted  No open Lesions  Skin of the LE is normal texture, turgor  Neurologic:  Gross sensation is intact   Protective sensation is Intact

## 2018-09-13 ENCOUNTER — CLINICAL SUPPORT (OUTPATIENT)
Dept: INTERNAL MEDICINE CLINIC | Facility: CLINIC | Age: 78
End: 2018-09-13
Payer: COMMERCIAL

## 2018-09-13 DIAGNOSIS — E53.8 VITAMIN B12 DEFICIENCY: Chronic | ICD-10-CM

## 2018-09-13 PROCEDURE — 96372 THER/PROPH/DIAG INJ SC/IM: CPT

## 2018-09-13 RX ADMIN — CYANOCOBALAMIN 1000 MCG: 1000 INJECTION INTRAMUSCULAR; SUBCUTANEOUS at 14:18

## 2018-09-13 NOTE — PROGRESS NOTES
PT  CAME INTO THE OFFICE TODAY FOR A NURSE VISIT FOR A VIT  B12 INJECTION  PT  PROVIDED THE SERUM AND 1 0 ML WAS GIVEN IN HER LEFT DELTOID   PT  TO RETURN IN ONE MONTH FOR NEXT DOSE

## 2018-10-08 ENCOUNTER — REMOTE DEVICE CLINIC VISIT (OUTPATIENT)
Dept: CARDIOLOGY CLINIC | Facility: CLINIC | Age: 78
End: 2018-10-08
Payer: COMMERCIAL

## 2018-10-08 DIAGNOSIS — I49.5 SICK SINUS SYNDROME (HCC): Primary | ICD-10-CM

## 2018-10-08 DIAGNOSIS — R00.1 BRADYCARDIA: ICD-10-CM

## 2018-10-08 DIAGNOSIS — Z95.0 CARDIAC PACEMAKER IN SITU: ICD-10-CM

## 2018-10-08 PROCEDURE — 93296 REM INTERROG EVL PM/IDS: CPT | Performed by: INTERNAL MEDICINE

## 2018-10-08 PROCEDURE — 93294 REM INTERROG EVL PM/LDLS PM: CPT | Performed by: INTERNAL MEDICINE

## 2018-10-08 NOTE — PROGRESS NOTES
Results for orders placed or performed in visit on 10/08/18   Cardiac EP device report    Narrative    MDT VVI PPM  CARELINK TRANSMISSION: BATTERY VOLTAGE ADEQUATE (13 8 YRS)  -48% (>40% [place])  ALL AVAILABLE LEAD PARAMETERS WITHIN NORMAL LIMITS  5 NSVT EPISODES, MOST RECENT FOR 8 BEATS, AVG CL~315MS  EF-76% (NST 1/5/18)  PT ON [place], VERAPAMIL & BYSTOLIC  NORMAL DEVICE FUNCTION   GV

## 2018-10-12 ENCOUNTER — CLINICAL SUPPORT (OUTPATIENT)
Dept: INTERNAL MEDICINE CLINIC | Facility: CLINIC | Age: 78
End: 2018-10-12
Payer: COMMERCIAL

## 2018-10-12 DIAGNOSIS — E53.8 VITAMIN B12 DEFICIENCY: Chronic | ICD-10-CM

## 2018-10-12 DIAGNOSIS — Z23 NEED FOR INFLUENZA VACCINATION: Primary | ICD-10-CM

## 2018-10-12 PROCEDURE — G0008 ADMIN INFLUENZA VIRUS VAC: HCPCS | Performed by: HOSPITALIST

## 2018-10-12 PROCEDURE — 90662 IIV NO PRSV INCREASED AG IM: CPT | Performed by: HOSPITALIST

## 2018-10-12 PROCEDURE — 96372 THER/PROPH/DIAG INJ SC/IM: CPT

## 2018-10-12 RX ADMIN — CYANOCOBALAMIN 1000 MCG: 1000 INJECTION INTRAMUSCULAR; SUBCUTANEOUS at 13:41

## 2018-10-23 ENCOUNTER — HOSPITAL ENCOUNTER (EMERGENCY)
Facility: HOSPITAL | Age: 78
Discharge: HOME | End: 2018-10-23
Attending: EMERGENCY MEDICINE | Admitting: EMERGENCY MEDICINE
Payer: COMMERCIAL

## 2018-10-23 VITALS
SYSTOLIC BLOOD PRESSURE: 167 MMHG | OXYGEN SATURATION: 96 % | DIASTOLIC BLOOD PRESSURE: 74 MMHG | HEART RATE: 78 BPM | TEMPERATURE: 97.7 F | RESPIRATION RATE: 18 BRPM

## 2018-10-23 DIAGNOSIS — K13.79 ORAL BLEEDING: Primary | ICD-10-CM

## 2018-10-23 LAB
APTT PPP: 36 SECONDS (ref 24–36)
BASOPHILS # BLD AUTO: 0.03 THOUSANDS/ΜL (ref 0–0.1)
BASOPHILS NFR BLD AUTO: 1 % (ref 0–1)
EOSINOPHIL # BLD AUTO: 0.09 THOUSAND/ΜL (ref 0–0.61)
EOSINOPHIL NFR BLD AUTO: 2 % (ref 0–6)
ERYTHROCYTE [DISTWIDTH] IN BLOOD BY AUTOMATED COUNT: 12.8 % (ref 11.6–15.1)
HCT VFR BLD AUTO: 35.5 % (ref 34.8–46.1)
HGB BLD-MCNC: 11.3 G/DL (ref 11.5–15.4)
IMM GRANULOCYTES # BLD AUTO: 0.02 THOUSAND/UL (ref 0–0.2)
IMM GRANULOCYTES NFR BLD AUTO: 0 % (ref 0–2)
INR PPP: 2.6 (ref 0.86–1.17)
LYMPHOCYTES # BLD AUTO: 1.15 THOUSANDS/ΜL (ref 0.6–4.47)
LYMPHOCYTES NFR BLD AUTO: 20 % (ref 14–44)
MCH RBC QN AUTO: 30.7 PG (ref 26.8–34.3)
MCHC RBC AUTO-ENTMCNC: 31.8 G/DL (ref 31.4–37.4)
MCV RBC AUTO: 97 FL (ref 82–98)
MONOCYTES # BLD AUTO: 0.46 THOUSAND/ΜL (ref 0.17–1.22)
MONOCYTES NFR BLD AUTO: 8 % (ref 4–12)
NEUTROPHILS # BLD AUTO: 4.1 THOUSANDS/ΜL (ref 1.85–7.62)
NEUTS SEG NFR BLD AUTO: 69 % (ref 43–75)
NRBC BLD AUTO-RTO: 0 /100 WBCS
PLATELET # BLD AUTO: 195 THOUSANDS/UL (ref 149–390)
PMV BLD AUTO: 10.5 FL (ref 8.9–12.7)
PROTHROMBIN TIME: 27.9 SECONDS (ref 11.8–14.2)
RBC # BLD AUTO: 3.68 MILLION/UL (ref 3.81–5.12)
WBC # BLD AUTO: 5.85 THOUSAND/UL (ref 4.31–10.16)

## 2018-10-23 PROCEDURE — 85610 PROTHROMBIN TIME: CPT | Performed by: EMERGENCY MEDICINE

## 2018-10-23 PROCEDURE — 85025 COMPLETE CBC W/AUTO DIFF WBC: CPT | Performed by: EMERGENCY MEDICINE

## 2018-10-23 PROCEDURE — 99283 EMERGENCY DEPT VISIT LOW MDM: CPT

## 2018-10-23 PROCEDURE — 85730 THROMBOPLASTIN TIME PARTIAL: CPT | Performed by: EMERGENCY MEDICINE

## 2018-10-23 PROCEDURE — 36415 COLL VENOUS BLD VENIPUNCTURE: CPT | Performed by: EMERGENCY MEDICINE

## 2018-10-23 RX ORDER — OXYMETAZOLINE HYDROCHLORIDE 0.05 G/100ML
2 SPRAY NASAL ONCE
Status: COMPLETED | OUTPATIENT
Start: 2018-10-23 | End: 2018-10-23

## 2018-10-23 RX ADMIN — Medication 2 SPRAY: at 07:55

## 2018-10-23 NOTE — ED PROVIDER NOTES
History  Chief Complaint   Patient presents with    Bleeding/Bruising     woke up and had bleeding in her mouth  26-year-old female with previous medical history of atrial fibrillation on Xarelto, hypertension, GERD  Patient presents with bleeding in her mouth since 6:00 a m  This morning  Patient can't think of any inciting incident  Patient has not had bleeding in her mouth before like this  Patient has been on Xarelto for multiple years without any changes in dose recently  Patient denies any recent fevers, chills, nauseous, chest pain, palpitations, dizziness, weakness, numbness, tingling  Patient has been in normal state and denies any recent sickness  Patient denies pain  Bleeding is not increasing  Blood is dark maroon  Patient has not tried anything to stop bleeding  Nothing makes the bleeding worse  Prior to Admission Medications   Prescriptions Last Dose Informant Patient Reported? Taking?    Na Sulfate-K Sulfate-Mg Sulf 17 5-3 13-1 6 GM/180ML SOLN   No Yes   Sig: Use as directed by office Lot # [id number] expires 6/2020   diclofenac sodium (VOLTAREN) 1 %  Self No Yes   Sig: Apply 2 g topically 4 (four) times a day Apply to hands   indapamide (LOZOL) 2 5 mg tablet  Self No Yes   Sig: Take 1 tablet (2 5 mg total) by mouth every morning   losartan (COZAAR) 100 MG tablet   No Yes   Sig: Take 1 tablet (100 mg total) by mouth daily   nebivolol (BYSTOLIC) 5 mg tablet  Self No Yes   Sig: Take 1 tablet (5 mg total) by mouth daily   rivaroxaban (XARELTO) 15 mg tablet  Self No Yes   Sig: Take 1 tablet (15 mg total) by mouth daily   spironolactone (ALDACTONE) 25 mg tablet  Self No Yes   Sig: Take 1 tablet (25 mg total) by mouth daily   verapamil (CALAN) 120 mg tablet  Self No Yes   Sig: Take 1 tablet (120 mg total) by mouth 2 (two) times a day      Facility-Administered Medications Last Administration Doses Remaining   cyanocobalamin injection 1,000 mcg 10/12/2018  1:41 PM 2          Past Medical History:   Diagnosis Date    [address]fib [address] [address]     Anemia     Arthritis     Breast pain, right     Chest pain on breathing     Cough     Encounter for screening colonoscopy     H/O sick sinus syndrome     Heart murmur     High cholesterol     History of atrial fibrillation     Rate ontrolled  On xarelto 15mg (creatinine 50) Followed by Dr Katie Echevarria with Cardiology     History of weight loss     Report loose fitting clothes  Weight stable (3lbs difference)  Last colo showed small tubular adenoma x2  Breast biopsy last showed fibrocystic changes  TSH wnl  Will check cbc, bmp, spep   Hx of long term use of blood thinners     Hypertension     Irregular heart beat     Pacemaker     Preop examination     Shortness of breath     Viral bronchitis        Past Surgical History:   Procedure Laterality Date    BREAST BIOPSY      Percutaneous Needle Core     CATARACT EXTRACTION W/ INTRAOCULAR LENS  IMPLANT, BILATERAL      COLONOSCOPY      COLONOSCOPY N/A 5/22/2018    Procedure: COLONOSCOPY;  Surgeon: Shabnam Corbin DO;  [facility]; Service: Gastroenterology    INSERT / Pan Slipper / Graciela Jessica Right     as a child after a fall    VT CMBND ANTERPOST COLPORRAPHY W/CYSTO N/A 3/17/2016    Procedure: COLPORRHAPHY ANTERIOR POSTERIOR ;  Surgeon: Pal Doan MD;  [facility];  Service: Gynecology    VT CYSTOURETHROSCOPY N/A 3/17/2016    Procedure: Tato Echols;  Surgeon: Pal Doan MD;  [facility];  Service: Gynecology    VT ESOPHAGOGASTRODUODENOSCOPY TRANSORAL DIAGNOSTIC N/A 4/16/2018    Procedure: EGD AND COLONOSCOPY;  Surgeon: Shabnam Corbin DO;  [facility];   Service: Gastroenterology    VT REVAGINAL PROLAPSE,SACROSP LIG N/A 3/17/2016    Procedure: COLPOPEXY VAGINAL EXTRAPERITONEAL (VEC) ANTERIOR ;  Surgeon: Pal Doan MD;  [facility];  Service: Gynecology    VT SLING OPER STRES INCONTINENCE N/A 3/17/2016    Procedure: INSERTION PUBOVAGINAL SLING SINGLE INCISION ;  Surgeon: Justin Overton MD;  [facility];  Service: Gynecology       Family History   Problem Relation Age of Onset    Diabetes Mother      I have reviewed and agree with the history as documented  Social History   Substance Use Topics    Smoking status: Never Smoker    Smokeless tobacco: Never Used    Alcohol use No        Review of Systems   HENT: Positive for dental problem  Hematological: Bruises/bleeds easily  All other systems reviewed and are negative  Physical Exam  ED Triage Vitals [10/23/18 0717]   Temperature Pulse Respirations Blood Pressure SpO2   97 7 °F (36 5 °C) 78 18 167/74 96 %      Temp Source Heart Rate Source Patient Position - Orthostatic VS BP Location FiO2 (%)   Oral Monitor Sitting Right arm --      Pain Score       --           Orthostatic Vital Signs  Vitals:    10/23/18 0717   BP: 167/74   Pulse: 78   Patient Position - Orthostatic VS: Sitting       Physical Exam   Constitutional: She appears well-developed and well-nourished  No distress  HENT:   Head: Normocephalic  Right Ear: External ear normal    Left Ear: External ear normal    Blood bruising just lateral to the inferior molars on the right side of the patient's mouth  Eyes: Conjunctivae and EOM are normal    Neck: No JVD present  No tracheal deviation present  Cardiovascular: Normal rate, regular rhythm, normal heart sounds and intact distal pulses  No murmur heard  Pulmonary/Chest: Breath sounds normal  No stridor  No respiratory distress  She has no wheezes  She has no rales  Abdominal: Soft  Bowel sounds are normal  She exhibits no distension and no mass  There is no tenderness  There is no rebound and no guarding  Genitourinary:   Genitourinary Comments: Deferred   Musculoskeletal: She exhibits no edema, tenderness or deformity  Neurological: She exhibits normal muscle tone   Coordination normal    Skin: Skin is warm and dry  Capillary refill takes less than 2 seconds  No rash noted  She is not diaphoretic  No erythema  No pallor  Psychiatric: She has a normal mood and affect  Her behavior is normal  Judgment and thought content normal    Nursing note and vitals reviewed  ED Medications  Medications   oxymetazoline (AFRIN) 0 05 % nasal spray 2 spray (2 sprays Each Nare Given 10/23/18 0750)       Diagnostic Studies  Results Reviewed     Procedure Component Value Units Date/Time    Protime-INR [[id number]]  (Abnormal) Collected:  10/23/18 0747    Lab Status:  Final result Specimen:  Blood from Arm, Left Updated:  10/23/18 0811     Protime 27 9 (H) seconds      INR 2 60 (H)    APTT [[id number]]  (Normal) Collected:  10/23/18 0747    Lab Status:  Final result Specimen:  Blood from Arm, Left Updated:  10/23/18 0811     PTT 36 seconds     CBC and differential [[id number]]  (Abnormal) Collected:  10/23/18 0747    Lab Status:  Final result Specimen:  Blood from Arm, Left Updated:  10/23/18 0758     WBC 5 85 Thousand/uL      RBC 3 68 (L) Million/uL      Hemoglobin 11 3 (L) g/dL      Hematocrit 35 5 %      MCV 97 fL      MCH 30 7 pg      MCHC 31 8 g/dL      RDW 12 8 %      MPV 10 5 fL      Platelets 563 Thousands/uL      nRBC 0 /100 WBCs      Neutrophils Relative 69 %      Immat GRANS % 0 %      Lymphocytes Relative 20 %      Monocytes Relative 8 %      Eosinophils Relative 2 %      Basophils Relative 1 %      Neutrophils Absolute 4 10 Thousands/µL      Immature Grans Absolute 0 02 Thousand/uL      Lymphocytes Absolute 1 15 Thousands/µL      Monocytes Absolute 0 46 Thousand/µL      Eosinophils Absolute 0 09 Thousand/µL      Basophils Absolute 0 03 Thousands/µL                  No orders to display         Procedures  Procedures      Phone Consults  ED Phone Contact    ED Course  ED Course as of Oct 23 0858   Tue Oct 23, 2018   0758 Slight Anemia   No thrombocytopenia on CBC     0831 On recheck patient's oral bleeding is stopped after her applying oxymetazoline via a pledget for the last 20 minutes  I instructed the patient to skip her dose of Xarelto for this evening and restart tomorrow evening with dinner  Patient will be discharged home  Identification of Seniors at Risk      Most Recent Value   (ISAR) Identification of Seniors at Risk   Before the illness or injury that brought you to the Emergency, did you need someone to help you on a regular basis? 0 Filed at: 10/23/2018 0718   In the last 24 hours, have you needed more help than usual?  0 Filed at: 10/23/2018 3302   Have you been hospitalized for one or more nights during the past 6 months? 0 Filed at: 10/23/2018 2249   In general, do you see well?  0 Filed at: 10/23/2018 5180   In general, do you have serious problems with your memory? 0 Filed at: 10/23/2018 6343   Do you take more than three different medications every day? 1 Filed at: 10/23/2018 4945   ISAR Score  1 Filed at: 10/23/2018 0706                          MDM  Number of Diagnoses or Management Options  Oral bleeding: new and requires workup  Diagnosis management comments: 72-year-old female with spontaneous bleeding on her right molars which is venous oozing  Patient is on Xarelto  Patient has not I changes of her Xarelto and has not had any uncontrolled bleeding in the past   Will treat patient symptomatically a 1st with packing in her right lower gum with Afrin soaked cotton  Will evaluate the patient with a CBC as well as PT and PTT  CritCare Time    Disposition  Final diagnoses:   Oral bleeding     Time reflects when diagnosis was documented in both MDM as applicable and the Disposition within this note     Time User Action Codes Description Comment    10/23/2018  7:48 AM Ewing Holter Add [K13 79] Oral bleeding       ED Disposition     ED Disposition Condition Comment    Discharge  Jm Morales discharge to home/self care      Condition at discharge: Good        Follow-up Information    None         Patient's Medications   Discharge Prescriptions    No medications on file     No discharge procedures on file  ED Provider  Attending physically available and evaluated Abel Mccracken I managed the patient along with the ED Attending      Electronically Signed by         Kayleigh Call DO  10/23/18 7942

## 2018-10-23 NOTE — ED ATTENDING ATTESTATION
Sharifa Hernandez MD, saw and evaluated the patient  I have discussed the patient with the resident/non-physician practitioner and agree with the resident's/non-physician practitioner's findings, Plan of Care, and MDM as documented in the resident's/non-physician practitioner's note, except where noted  All available labs and Radiology studies were reviewed  At this point I agree with the current assessment done in the Emergency Department  I have conducted an independent evaluation of this patient a history and physical is as follows:    Patient presents the emergency department chief complaint of bleeding from her gums or side of her mouth the patient states she has been taking Xarelto for several years for atrial fibrillation  The patient reports that she has never had any bleeding complications while on the Xarelto  The patient states she has been in her normal state of good health recently and was well when she went to bed last evening  The patient reports that she woke at 6:00 a m  Today and noticed that there was blood in her mouth and it appeared that she was losing blood from her right lower gum line  Persisted and so the patient came to the emergency department  The patient rates the amount of blood loss as minimal   The patient denies other bleeding or bruising  The patient denies other complaint  Physical exam demonstrates a pleasant alert nontoxic female in no acute distress  HEENT exam was normal except for slight oozing of blood from the right lower gum line  The teeth were nontender there is no evidence of abscess  The throat was normal   The neck was supple nontender  Lungs are clear with equal breath sounds  The heart had a regular rate rhythm  Skin had no rash  There were no other signs of excessive bleeding      Critical Care Time  CritCare Time    Procedures

## 2018-10-23 NOTE — DISCHARGE INSTRUCTIONS
Please skip your nighttime dosage Xeralto tonight  Restart tomorrow with meals  If you restart bleeding please place some cotton balls in your mouth over the bleeding and hold pressure  You can also try spraying afrin on the cotton balls before placing in your mouth  Come back to the ED if you are unable to stop the bleeding

## 2018-11-06 NOTE — TELEPHONE ENCOUNTER
DR [facility] PT    Pt called to r/s her procedures according to    pt is on blood thinners  Procedure on the 16th couldn't performed because of blood thinners   Pt also need help with the prep because of cost  Nauruan speaker November 6, 2018      Alexa Ray  [address]   [facility] [id number]        To Whom It May Concern:    Alexa ALEX ReganCory  was seen on 11/06/18    Please allow her more passing time due to foot injury.        Sincerely,        Dr. Hansel Willoughby D.P.M FAC FAS/lld

## 2018-11-12 ENCOUNTER — CLINICAL SUPPORT (OUTPATIENT)
Dept: INTERNAL MEDICINE CLINIC | Facility: CLINIC | Age: 78
End: 2018-11-12
Payer: COMMERCIAL

## 2018-11-12 DIAGNOSIS — E53.8 VITAMIN B12 DEFICIENCY: Chronic | ICD-10-CM

## 2018-11-12 PROCEDURE — 96372 THER/PROPH/DIAG INJ SC/IM: CPT | Performed by: INTERNAL MEDICINE

## 2018-11-12 RX ADMIN — CYANOCOBALAMIN 1000 MCG: 1000 INJECTION INTRAMUSCULAR; SUBCUTANEOUS at 09:53

## 2018-11-12 NOTE — PROGRESS NOTES
Patient seen here today on nurse schedule for her b12 injection  Patient brought b12 injection with her  Administered in left arm  Patient tolerated well

## 2018-12-07 NOTE — PROGRESS NOTES
PT  CAME INTO THE OFFICE TODAY FOR A NURSE VISIT FOR VIT  B12 INJECTION  PT  BROUGHT HER OWN SERUM AND 1 0 ML WAS GIVEN IN LEFT DELTOID  Telephone Encounter by Viviana Bazzi CMA at 01/03/17 10:25 AM     Author:  Viviana Bazzi CMA Service:  (none) Author Type:  Certified Medical Assistant     Filed:  01/03/17 10:26 AM Encounter Date:  12/21/2016 Status:  Signed     :  Viviana Bazzi CMA (Certified Medical Assistant)            Dr. Antoine is JULI until 1/4/17    Forward to PreCert as FYI[BR1.1M]      Revision History        User Key Date/Time User Provider Type Action    > BR1.1 01/03/17 10:26 AM Viviana Bazzi CMA Certified Medical Assistant Sign    M - Manual

## 2018-12-10 ENCOUNTER — HOSPITAL ENCOUNTER (OUTPATIENT)
Dept: RADIOLOGY | Facility: HOSPITAL | Age: 78
Discharge: HOME | End: 2018-12-10
Payer: COMMERCIAL

## 2018-12-10 VITALS — HEIGHT: 62 IN | BODY MASS INDEX: 24.48 KG/M2 | WEIGHT: 133 LBS

## 2018-12-10 DIAGNOSIS — Z12.39 SCREENING FOR BREAST CANCER: ICD-10-CM

## 2018-12-10 PROCEDURE — 77067 SCR MAMMO BI INCL CAD: CPT

## 2018-12-11 ENCOUNTER — OFFICE VISIT (OUTPATIENT)
Dept: MULTI SPECIALTY CLINIC | Facility: CLINIC | Age: 78
End: 2018-12-11
Payer: COMMERCIAL

## 2018-12-11 VITALS
WEIGHT: 135.8 LBS | SYSTOLIC BLOOD PRESSURE: 130 MMHG | HEART RATE: 80 BPM | BODY MASS INDEX: 24.06 KG/M2 | DIASTOLIC BLOOD PRESSURE: 74 MMHG | TEMPERATURE: 97.5 F | HEIGHT: 63 IN

## 2018-12-11 DIAGNOSIS — B35.1 ONYCHOMYCOSIS: Primary | ICD-10-CM

## 2018-12-11 PROCEDURE — 11719 TRIM NAIL(S) ANY NUMBER: CPT | Performed by: PODIATRIST

## 2018-12-11 PROCEDURE — 99212 OFFICE O/P EST SF 10 MIN: CPT | Performed by: PODIATRIST

## 2018-12-11 NOTE — PROGRESS NOTES
Podiatry   Juan Pablo Morgan 66 y o  female MRN: [id number]  Encounter: [id number]    Assessment/Plan     Assessment:  1  Onychomycosis  2  Right foot HAV     Plan:  - Patient was seen/examined  All questions and concerns addressed  - Nails x10 were sharply trimmed to normal length and thickness with a large nail nipper without incident   - Recommended to apply lotion b/l feet daily BID    - RTC in 10-11 weeks    Nail removal  Date/Time: 12/11/2018 9:13 AM  Performed by: Kamaljit Lemon  Authorized by: Kamaljit Lemon     Patient location:  Clinic  Nails trimmed:     Number of nails trimmed:  10              History of Present Illness     HPI:  Juan Pablo Morgan is a 66 y o  female who presents with elongated toenails   States that their nails are painful, elongated  They have difficulty applying their socks and shoes  The pressure within their shoe gear is painful and they have been unable to cut their nails adequately due to having pacemaker and unable to bent  Patient does not have numbness/tingling  The patient denies any nausea, vomiting, fever, chills, shortness of breath, or chest pains  Review of Systems   Constitutional: Negative  HENT: Negative  Eyes: Negative  Respiratory: Negative  Cardiovascular: Negative  Gastrointestinal: Negative  Musculoskeletal: Negative   Skin: elongated thickened toenails  Neurological: Negative  Historical Information   Past Medical History:   Diagnosis Date    A-fib ([address] )     Anemia     Arthritis     Breast pain, right     Chest pain on breathing     Cough     Encounter for screening colonoscopy     H/O sick sinus syndrome     Heart murmur     High cholesterol     History of atrial fibrillation     Rate ontrolled  On xarelto 15mg (creatinine 50) Followed by Dr Katie Cespedes with Cardiology     History of weight loss     Report loose fitting clothes  Weight stable (3lbs difference)  Last colo showed small tubular adenoma x2   Breast biopsy last showed fibrocystic changes  TSH wnl  Will check cbc, bmp, spep   Hx of long term use of blood thinners     Hypertension     Irregular heart beat     Pacemaker     Preop examination     Shortness of breath     Viral bronchitis      Past Surgical History:   Procedure Laterality Date    BREAST BIOPSY Right 08/17/2006    ultrasound guided Percutaneous Needle Core -Benign    CATARACT EXTRACTION W/ INTRAOCULAR LENS  IMPLANT, BILATERAL      COLONOSCOPY      COLONOSCOPY N/A 5/22/2018    Procedure: COLONOSCOPY;  Surgeon: Chencho Anderson DO;  [facility]; Service: Gastroenterology    Franc Brooks / Billy Nelson / Nikhil Millerchanetll Right     as a child after a fall    MAMMO STEREOTACTIC BREAST BIOPSY RIGHT (ALL INC) Right 10/2014    benign    MD CMBND ANTERPOST COLPORRAPHY W/CYSTO N/A 3/17/2016    Procedure: COLPORRHAPHY ANTERIOR POSTERIOR ;  Surgeon: Mary Jane Garcia MD;  [facility];  Service: Gynecology    MD CYSTOURETHROSCOPY N/A 3/17/2016    Procedure: Albert Murray;  Surgeon: Mary Jane Garcia MD;  [facility];  Service: Gynecology    MD ESOPHAGOGASTRODUODENOSCOPY TRANSORAL DIAGNOSTIC N/A 4/16/2018    Procedure: EGD AND COLONOSCOPY;  Surgeon: Chencho Anderson DO;  [facility];   Service: Gastroenterology    MD REVAGINAL PROLAPSE,SACROSP LIG N/A 3/17/2016    Procedure: COLPOPEXY VAGINAL EXTRAPERITONEAL (VEC) ANTERIOR ;  Surgeon: Mary Jane Garcia MD;  [facility];  Service: Gynecology    MD SLING OPER STRES INCONTINENCE N/A 3/17/2016    Procedure: INSERTION PUBOVAGINAL SLING SINGLE INCISION ;  Surgeon: Mary Jane Garcia MD;  [facility];  Service: Gynecology     Social History   History   Alcohol Use No     History   Drug Use No     History   Smoking Status    Never Smoker   Smokeless Tobacco    Never Used     Family History:   Family History   Problem Relation Age of Onset    Diabetes Mother     Breast cancer Sister 48 Meds/Allergies     (Not in a hospital admission)  Allergies   Allergen Reactions    Other Itching     Bandaids    Tape [Medical Tape] Itching       Objective     Current Vitals:   Blood Pressure: 130/74 (12/11/18 0851)  Pulse: 80 (12/11/18 0851)  Temperature: 97 5 °F (36 4 °C) (12/11/18 0851)  Temp Source: Oral (12/11/18 0851)  Height: 5' 3" (160 cm) (12/11/18 0851)  Weight - Scale: 61 6 kg (135 lb 12 9 oz) (12/11/18 0851)        /74 (BP Location: Left arm, Patient Position: Sitting, Cuff Size: Adult)   Pulse 80   Temp 97 5 °F (36 4 °C) (Oral)   Ht 5' 3" (1 6 m)   Wt 61 6 kg (135 lb 12 9 oz)   BMI 24 06 kg/m²       Lower Extremity Exam:    Musculoskeletal:  MMT is 5/5 to all compartments of the LE, +0/4 edema B/L, Digital ROM is intact,      Pulses:   R DP is +1/4, R PT is +1/4, L DP is +1/4, L PT is +1/4, CFT< 3sec to all digits  Pedal hair is Present     Skin:   Nails are thickened, elongated, TTP with notable subungual debris  No open Lesions  Skin of the LE is normal texture, turgor  Neurologic:  Gross sensation is intact   Protective sensation is Intact    Biomechanical Exam of LE:  Hip ROM WNL Bilateral, external and internal rotation about equal at 45° b/l  Knee ROM WNL Bilateral, no hyperextension noted b/l, no genu varum/valgum noted b/l  Ankle DF with knee extended is able to get pass vertical on right and able to get pass vertical on left  Ankle DF with knee flexed is able to get pass vertical on right and able to get pass vertical on left  Malleolar positioning is WNL b/l  STJ ROM WNL b/l, heel inversion is approximately 20° on right and 20° on left; heel eversion is approximately 10° on right and 10° on left  1st ray ROM WNL b/l, unable to dorsiflex/plantarflex b/l  Tibial varum is 0° b/l, Resting calcaneal stance position is vertical and approximately 0° on right and vertical and approximately 0° on left  Gait analysis: WNL  Gross deformity noted: right HAV

## 2018-12-12 ENCOUNTER — ANESTHESIA EVENT (OUTPATIENT)
Dept: PERIOP | Facility: AMBULARY SURGERY CENTER | Age: 78
End: 2018-12-12

## 2018-12-12 ENCOUNTER — CLINICAL SUPPORT (OUTPATIENT)
Dept: INTERNAL MEDICINE CLINIC | Facility: CLINIC | Age: 78
End: 2018-12-12
Payer: COMMERCIAL

## 2018-12-12 DIAGNOSIS — E53.8 VITAMIN B12 DEFICIENCY: Primary | ICD-10-CM

## 2018-12-12 PROCEDURE — 96372 THER/PROPH/DIAG INJ SC/IM: CPT

## 2018-12-12 RX ADMIN — CYANOCOBALAMIN 1000 MCG: 1000 INJECTION INTRAMUSCULAR; SUBCUTANEOUS at 08:58

## 2018-12-12 NOTE — PROGRESS NOTES
Patient here today for nurse visit for Vitamin B12 injection   Patient provided serum and 1 0 ML was administered in her left deltoid  Patient aware to return in 1 month for next dose

## 2018-12-27 ENCOUNTER — ANESTHESIA (OUTPATIENT)
Dept: PERIOP | Facility: AMBULARY SURGERY CENTER | Age: 78
End: 2018-12-27

## 2019-01-03 PROBLEM — I48.20 CHRONIC ATRIAL FIBRILLATION (HCC): Status: ACTIVE | Noted: 2017-12-08

## 2019-01-04 ENCOUNTER — OFFICE VISIT (OUTPATIENT)
Dept: INTERNAL MEDICINE CLINIC | Facility: CLINIC | Age: 79
End: 2019-01-04

## 2019-01-04 VITALS
SYSTOLIC BLOOD PRESSURE: 108 MMHG | DIASTOLIC BLOOD PRESSURE: 62 MMHG | HEART RATE: 80 BPM | BODY MASS INDEX: 23.67 KG/M2 | WEIGHT: 133.6 LBS | TEMPERATURE: 97.9 F | HEIGHT: 63 IN

## 2019-01-04 DIAGNOSIS — I10 ESSENTIAL HYPERTENSION: Primary | ICD-10-CM

## 2019-01-04 DIAGNOSIS — I48.20 CHRONIC ATRIAL FIBRILLATION (HCC): ICD-10-CM

## 2019-01-04 DIAGNOSIS — D64.9 ANEMIA, UNSPECIFIED TYPE: ICD-10-CM

## 2019-01-04 DIAGNOSIS — E53.8 VITAMIN B12 DEFICIENCY: Chronic | ICD-10-CM

## 2019-01-04 PROCEDURE — 99213 OFFICE O/P EST LOW 20 MIN: CPT | Performed by: INTERNAL MEDICINE

## 2019-01-04 RX ORDER — CYANOCOBALAMIN 1000 UG/ML
1000 INJECTION INTRAMUSCULAR; SUBCUTANEOUS
Status: DISCONTINUED | OUTPATIENT
Start: 2019-01-16 | End: 2019-01-16

## 2019-01-04 RX ORDER — VERAPAMIL HYDROCHLORIDE 120 MG/1
120 CAPSULE, EXTENDED RELEASE ORAL
Qty: 90 CAPSULE | Refills: 1 | Status: SHIPPED | OUTPATIENT
Start: 2019-01-04 | End: 2019-07-16 | Stop reason: SDUPTHER

## 2019-01-04 RX ORDER — LOSARTAN POTASSIUM 50 MG/1
50 TABLET ORAL DAILY
Qty: 90 TABLET | Refills: 1 | Status: SHIPPED | OUTPATIENT
Start: 2019-01-04 | End: 2019-02-05 | Stop reason: SDUPTHER

## 2019-01-04 NOTE — ASSESSMENT & PLAN NOTE
· Patient's blood pressure very well controlled    · Will reduce dose of losartan from 100 mg daily to 50 mg daily  · Return in 4 months for routine follow-up

## 2019-01-04 NOTE — PROGRESS NOTES
Assessment/Plan:       Problem List Items Addressed This Visit        Cardiovascular and Mediastinum    Essential hypertension - Primary (Chronic)     · Patient's blood pressure very well controlled  · Will reduce dose of losartan from 100 mg daily to 50 mg daily  · Return in 4 months for routine follow-up         Relevant Medications    losartan (COZAAR) 50 mg tablet    verapamil (VERELAN PM) 120 MG 24 hr capsule    Chronic atrial fibrillation (HCC)     · Rate controlled  Also on Xarelto  · Patient reports difficulty with swallowing the verapamil tablet  · Will switch verapamil from 120 mg b i d  tablet to 120 mg 24 hour/Daily capsule  Relevant Medications    rivaroxaban (XARELTO) 15 mg tablet    verapamil (VERELAN PM) 120 MG 24 hr capsule       Other    Vitamin B12 deficiency (Chronic)     · Obtain blood work  · Continue oral vitamin B12 supplementation         Relevant Medications    cyanocobalamin 1000 MCG tablet    Anemia     · Will assess stability with blood work         Relevant Medications    cyanocobalamin 1000 MCG tablet    Other Relevant Orders    Iron Panel    Methylmalonic acid, serum    CBC and differential    Retic Count            Subjective:      Patient ID: Chalino Trujillo is a 66 y o  female  HPI     Patient is here for a six-month follow-up  She has no complaints outside of her usual issue with bilateral wrist osteoarthritis  Pt takes voltaren at times for her pain in addition to tylenol  She feels like these control her pain sufficiently  Has appt for colonoscopy in March due to having a tubulovillous adenoma of the colon discovered by colonoscopy last year  Patient also has questions regarding her vitamin B levels  She is requesting refill of a B-12 tablet, which she takes a 1000 mcg daily  Says she has trouble swallowing her verapamil tablet  Is requesting a capsule instead    She takes this for history of atrial fibrillation, for which she is also on Xarelto  The following portions of the patient's history were reviewed and updated as appropriate: allergies, current medications, past family history, past medical history, past social history, past surgical history and problem list     Review of Systems   Constitutional: Negative for fatigue, fever and unexpected weight change  Respiratory: Negative for shortness of breath  Cardiovascular: Negative for chest pain  Musculoskeletal: Positive for arthralgias  Objective:    /62 (BP Location: Left arm, Patient Position: Sitting, Cuff Size: Adult)   Pulse 80   Temp 97 9 °F (36 6 °C) (Oral)   Ht 5' 3" (1 6 m)   Wt 60 6 kg (133 lb 9 6 oz)   BMI 23 67 kg/m²        Physical Exam   Constitutional: She is oriented to person, place, and time  She appears well-developed and well-nourished  No distress  Eyes: Pupils are equal, round, and reactive to light  Conjunctivae and EOM are normal    Neck: Neck supple  Cardiovascular: Normal rate, regular rhythm, normal heart sounds and intact distal pulses  Pulmonary/Chest: Effort normal and breath sounds normal    Musculoskeletal:   Trace bilateral pitting edema   Neurological: She is alert and oriented to person, place, and time  Skin: Skin is warm and dry  She is not diaphoretic  Vitals reviewed  Portions of the record may have been created with voice recognition software   Occasional wrong word or "sound a like" substitutions may have occurred due to the inherent limitations of voice recognition software   Read the chart carefully and recognize, using context, where substitutions have occurred

## 2019-01-04 NOTE — ASSESSMENT & PLAN NOTE
· Rate controlled  Also on Xarelto  · Patient reports difficulty with swallowing the verapamil tablet  · Will switch verapamil from 120 mg b i d  tablet to 120 mg 24 hour/Daily capsule

## 2019-01-07 ENCOUNTER — REMOTE DEVICE CLINIC VISIT (OUTPATIENT)
Dept: CARDIOLOGY CLINIC | Facility: CLINIC | Age: 79
End: 2019-01-07
Payer: COMMERCIAL

## 2019-01-07 DIAGNOSIS — I49.5 SSS (SICK SINUS SYNDROME) (HCC): Primary | ICD-10-CM

## 2019-01-07 DIAGNOSIS — Z95.0 CARDIAC PACEMAKER IN SITU: ICD-10-CM

## 2019-01-07 PROCEDURE — 93294 REM INTERROG EVL PM/LDLS PM: CPT | Performed by: INTERNAL MEDICINE

## 2019-01-07 PROCEDURE — 93296 REM INTERROG EVL PM/IDS: CPT | Performed by: INTERNAL MEDICINE

## 2019-01-07 NOTE — PROGRESS NOTES
Results for orders placed or performed in visit on 01/07/19   Cardiac EP device report    Narrative    MDT VVI PPM  CARELINK TRANSMISSION: BATTERY STATUS "OK"   53%  ALL AVAILABLE LEAD PARAMETERS WITHIN NORMAL LIMITS  NO SIGNIFICANT HIGH RATE EPISODES  NORMAL DEVICE FUNCTION   NC

## 2019-01-16 ENCOUNTER — CLINICAL SUPPORT (OUTPATIENT)
Dept: INTERNAL MEDICINE CLINIC | Facility: CLINIC | Age: 79
End: 2019-01-16

## 2019-01-16 ENCOUNTER — APPOINTMENT (OUTPATIENT)
Dept: LAB | Facility: CLINIC | Age: 79
End: 2019-01-16
Payer: COMMERCIAL

## 2019-01-16 DIAGNOSIS — D64.9 ANEMIA, UNSPECIFIED TYPE: ICD-10-CM

## 2019-01-16 DIAGNOSIS — E53.8 B12 DEFICIENCY: Primary | ICD-10-CM

## 2019-01-16 LAB
BASOPHILS # BLD AUTO: 0.05 THOUSANDS/ΜL (ref 0–0.1)
BASOPHILS NFR BLD AUTO: 1 % (ref 0–1)
EOSINOPHIL # BLD AUTO: 0.09 THOUSAND/ΜL (ref 0–0.61)
EOSINOPHIL NFR BLD AUTO: 1 % (ref 0–6)
ERYTHROCYTE [DISTWIDTH] IN BLOOD BY AUTOMATED COUNT: 12.7 % (ref 11.6–15.1)
FERRITIN SERPL-MCNC: 108 NG/ML (ref 8–388)
HCT VFR BLD AUTO: 41 % (ref 34.8–46.1)
HGB BLD-MCNC: 13.1 G/DL (ref 11.5–15.4)
IMM GRANULOCYTES # BLD AUTO: 0.02 THOUSAND/UL (ref 0–0.2)
IMM GRANULOCYTES NFR BLD AUTO: 0 % (ref 0–2)
IRON SATN MFR SERPL: 31 %
IRON SERPL-MCNC: 89 UG/DL (ref 50–170)
LYMPHOCYTES # BLD AUTO: 1.87 THOUSANDS/ΜL (ref 0.6–4.47)
LYMPHOCYTES NFR BLD AUTO: 26 % (ref 14–44)
MCH RBC QN AUTO: 31 PG (ref 26.8–34.3)
MCHC RBC AUTO-ENTMCNC: 32 G/DL (ref 31.4–37.4)
MCV RBC AUTO: 97 FL (ref 82–98)
MONOCYTES # BLD AUTO: 0.64 THOUSAND/ΜL (ref 0.17–1.22)
MONOCYTES NFR BLD AUTO: 9 % (ref 4–12)
NEUTROPHILS # BLD AUTO: 4.45 THOUSANDS/ΜL (ref 1.85–7.62)
NEUTS SEG NFR BLD AUTO: 63 % (ref 43–75)
NRBC BLD AUTO-RTO: 0 /100 WBCS
PLATELET # BLD AUTO: 223 THOUSANDS/UL (ref 149–390)
PMV BLD AUTO: 12 FL (ref 8.9–12.7)
RBC # BLD AUTO: 4.22 MILLION/UL (ref 3.81–5.12)
RETICS # AUTO: NORMAL 10*3/UL (ref 14097–95744)
RETICS # CALC: 1.2 % (ref 0.37–1.87)
TIBC SERPL-MCNC: 288 UG/DL (ref 250–450)
WBC # BLD AUTO: 7.12 THOUSAND/UL (ref 4.31–10.16)

## 2019-01-16 PROCEDURE — 82728 ASSAY OF FERRITIN: CPT

## 2019-01-16 PROCEDURE — 85025 COMPLETE CBC W/AUTO DIFF WBC: CPT

## 2019-01-16 PROCEDURE — 83550 IRON BINDING TEST: CPT

## 2019-01-16 PROCEDURE — 83540 ASSAY OF IRON: CPT

## 2019-01-16 PROCEDURE — 83918 ORGANIC ACIDS TOTAL QUANT: CPT

## 2019-01-16 PROCEDURE — 85045 AUTOMATED RETICULOCYTE COUNT: CPT

## 2019-01-16 PROCEDURE — 96372 THER/PROPH/DIAG INJ SC/IM: CPT | Performed by: INTERNAL MEDICINE

## 2019-01-16 PROCEDURE — 36415 COLL VENOUS BLD VENIPUNCTURE: CPT

## 2019-01-16 RX ORDER — CYANOCOBALAMIN 1000 UG/ML
1000 INJECTION INTRAMUSCULAR; SUBCUTANEOUS
Status: DISCONTINUED | OUTPATIENT
Start: 2019-01-16 | End: 2019-05-09

## 2019-01-16 RX ADMIN — CYANOCOBALAMIN 1000 MCG: 1000 INJECTION INTRAMUSCULAR; SUBCUTANEOUS at 11:22

## 2019-01-19 LAB
METHYLMALONATE SERPL-SCNC: 238 NMOL/L (ref 0–378)
SL AMB DISCLAIMER: NORMAL

## 2019-01-29 ENCOUNTER — TELEPHONE (OUTPATIENT)
Dept: CARDIOLOGY CLINIC | Facility: CLINIC | Age: 79
End: 2019-01-29

## 2019-01-29 NOTE — TELEPHONE ENCOUNTER
Thanks Publelo  She used to have her BP managed by Dr Nicole Dunlap and she switched to me as  Primary cardiologist  Mackenzie Duncan bring her in to see PA if you don't mind

## 2019-01-29 NOTE — TELEPHONE ENCOUNTER
Patient called, Marcel Mcneal interpreted conversation for me as patient is Yakut speaking only  She was complaining of "not feeling right" and that she "knows her body" and something is wrong  She saw her PCP 1/4/19 and losartan was decreased to 50mg daily vs 100mg  /62  She states starting to feel unwell about 2 weeks after her PCP appointment  She was adamant that you Rx'd her losartan and wants to discuss with you  I did verify with her pharmacy that you were prescribing MD   She is unable to check her BP at home  Would you like me to bring her in just for a nurse visit to do EKG and BP check or just a BP check for her, maybe elevated since decreasing the dose? Please advise  I did have patient send a device transmission which was unremarkable, will give to you for review tomorrow

## 2019-01-30 NOTE — TELEPHONE ENCOUNTER
Spoke to patient, feels "so-so" today  Offered patient appointment today but could not take it due to transportation  Will try and have patient seen by PA next week

## 2019-02-05 ENCOUNTER — OFFICE VISIT (OUTPATIENT)
Dept: CARDIOLOGY CLINIC | Facility: CLINIC | Age: 79
End: 2019-02-05
Payer: COMMERCIAL

## 2019-02-05 VITALS
HEART RATE: 77 BPM | DIASTOLIC BLOOD PRESSURE: 72 MMHG | SYSTOLIC BLOOD PRESSURE: 134 MMHG | BODY MASS INDEX: 24.11 KG/M2 | HEIGHT: 63 IN | WEIGHT: 136.1 LBS

## 2019-02-05 DIAGNOSIS — I10 ESSENTIAL HYPERTENSION: ICD-10-CM

## 2019-02-05 DIAGNOSIS — I48.19 PERSISTENT ATRIAL FIBRILLATION (HCC): Primary | ICD-10-CM

## 2019-02-05 PROCEDURE — 99213 OFFICE O/P EST LOW 20 MIN: CPT | Performed by: PHYSICIAN ASSISTANT

## 2019-02-05 PROCEDURE — 93000 ELECTROCARDIOGRAM COMPLETE: CPT | Performed by: PHYSICIAN ASSISTANT

## 2019-02-05 RX ORDER — LOSARTAN POTASSIUM 50 MG/1
50 TABLET ORAL DAILY
Qty: 90 TABLET | Refills: 1 | Status: SHIPPED | OUTPATIENT
Start: 2019-02-05 | End: 2019-03-05 | Stop reason: ALTCHOICE

## 2019-02-05 NOTE — PROGRESS NOTES
Electrophysiology Office Note    Jm Morales  1940  [id number]  HEART [facility]ASCULAR [facility]  [address]        Assessment/Plan     1  Persistent atrial fibrillation (HCC)  POCT ECG   2  Essential hypertension  losartan (COZAAR) 50 mg tablet       1  Permanent atrial fibrillation, asymptomatic   * on anticoagulation with Xarelto at reduced dose due to creatinine clearance less than 50, she is having no major minor bleeding issues with the Xarelto   * rates are currently controlled   * okay to reduce verapamil 120 mg once daily, if heart rates become uncontrolled we will increase her dose back to 120 mg twice daily    2  Hypertension   * she has been splitting her 100 mg losartan tablets only taking 50 mg daily her blood pressure is acceptable on this dose, I will call in a new prescription so she does not have a split 100 mg tablets  From this point forward she should be taking 50 mg tablet of losartan daily  We will monitor her blood pressures and adjust as needed  3  Tachy-tasha syndrome   * status post single-chamber Medtronic pacemaker in 1996 with generator change in March 2018, last device interrogation showing 53% V pacing    History of Present Illness     HPI/INTERVAL HISTORY: Jm Morales is a 66 y o  female with a history of permanent atrial fibrillation anticoagulation with Xarelto, hypertension, tachy-tasha syndrome status post single-chamber Medtronic pacemaker (1996) with last generator change in March 2018 who presents to the EP office today for questions regarding her chronic medications  She normally follows with Dr Rosie Wynne  2-5-19:  From a cardiac perspective patient has no specific concerns or complaints  She denies any lightheadedness, dizziness, shortness of breath, chest discomfort  Recently patient was evaluated by her PCP who adjusted some of her cardiac medications    He dropped her losartan from 100 mg daily to 50 mg daily and change her verapamil from 120 mg twice daily to 120 mg once daily  Confusion set in when she received her new prescriptions which on the bottles state that she should be taking verapamil 120 mg twice daily and losartan 100 mg daily  She was questioning what she should do/follow given the variable instructions  Review of Systems  ROS as noted above, otherwise 12 point review of systems was performed and is negative  Historical Information   Social History     Social History    Marital status:      Spouse name: N/A    Number of children: N/A    Years of education: N/A     Occupational History    Not on file  Social History Main Topics    Smoking status: Never Smoker    Smokeless tobacco: Never Used    Alcohol use No    Drug use: No    Sexual activity: No      Comment:      Other Topics Concern    Not on file     Social History Narrative    Housing, household, and economic circumstances      Past Medical History:   Diagnosis Date    [address] [address]     Anemia     Arthritis     Breast pain, right     Chest pain on breathing     Cough     Encounter for screening colonoscopy     H/O sick sinus syndrome     Heart murmur     High cholesterol     History of atrial fibrillation     Rate ontrolled  On xarelto 15mg (creatinine 50) Followed by Dr Vaishnavi Adams with Cardiology     History of weight loss     Report loose fitting clothes  Weight stable (3lbs difference)  Last colo showed small tubular adenoma x2  Breast biopsy last showed fibrocystic changes  TSH wnl  Will check cbc, bmp, spep          Hx of long term use of blood thinners     Hypertension     Irregular heart beat     Pacemaker     Preop examination     Shortness of breath     Viral bronchitis      Past Surgical History:   Procedure Laterality Date    BREAST BIOPSY Right 08/17/2006    ultrasound guided Percutaneous Needle Core -Benign    CATARACT EXTRACTION W/ INTRAOCULAR LENS  IMPLANT, BILATERAL      COLONOSCOPY      COLONOSCOPY N/A 5/22/2018    Procedure: COLONOSCOPY;  Surgeon: Jose Rayo DO;  [facility]; Service: Gastroenterology    Nilesh Hdez / Lexy Cabrales / Sharyle Canales Right     as a child after a fall    MAMMO STEREOTACTIC BREAST BIOPSY RIGHT (ALL INC) Right 10/2014    benign    ME CMBND ANTERPOST COLPORRAPHY W/CYSTO N/A 3/17/2016    Procedure: COLPORRHAPHY ANTERIOR POSTERIOR ;  Surgeon: Petty Black MD;  [facility];  Service: Gynecology    ME CYSTOURETHROSCOPY N/A 3/17/2016    Procedure: Lila Amend;  Surgeon: Petty Black MD;  [facility];  Service: Gynecology    ME ESOPHAGOGASTRODUODENOSCOPY TRANSORAL DIAGNOSTIC N/A 4/16/2018    Procedure: EGD AND COLONOSCOPY;  Surgeon: Jose Rayo DO;  [facility];   Service: Gastroenterology    ME Kevyn Bough LIG N/A 3/17/2016    Procedure: COLPOPEXY VAGINAL EXTRAPERITONEAL (VEC) ANTERIOR ;  Surgeon: Petty Black MD;  [facility];  Service: Gynecology    ME SLING OPER STRES INCONTINENCE N/A 3/17/2016    Procedure: INSERTION PUBOVAGINAL SLING SINGLE INCISION ;  Surgeon: Petty Black MD;  [facility];  Service: Gynecology     History   Alcohol Use No     History   Drug Use No     History   Smoking Status    Never Smoker   Smokeless Tobacco    Never Used     Family History   Problem Relation Age of Onset    Diabetes Mother     Breast cancer Sister 48       Meds/Allergies       Current Outpatient Prescriptions:     diclofenac sodium (VOLTAREN) 1 %, Apply 2 g topically 4 (four) times a day Apply to hands, Disp: 1 Tube, Rfl: 2    indapamide (LOZOL) 2 5 mg tablet, Take 1 tablet (2 5 mg total) by mouth every morning, Disp: 90 tablet, Rfl: 3    losartan (COZAAR) 50 mg tablet, Take 1 tablet (50 mg total) by mouth daily, Disp: 90 tablet, Rfl: 1    nebivolol (BYSTOLIC) 5 mg tablet, Take 1 tablet (5 mg total) by mouth daily, Disp: 90 tablet, Rfl: 3    rivaroxaban (XARELTO) 15 mg tablet, Take 1 tablet (15 mg total) by mouth daily, Disp: 90 tablet, Rfl: 2    spironolactone (ALDACTONE) 25 mg tablet, Take 1 tablet (25 mg total) by mouth daily, Disp: 90 tablet, Rfl: 3    verapamil (VERELAN PM) 120 MG 24 hr capsule, Take 1 capsule (120 mg total) by mouth daily at bedtime, Disp: 90 capsule, Rfl: 1    Current Facility-Administered Medications:     cyanocobalamin injection 1,000 mcg, 1,000 mcg, Intramuscular, Q30 Days, Anne Mariekaushal Asher, DO, 1,000 mcg at 01/16/19 1122    Allergies   Allergen Reactions    Other Itching     Bandaids    Tape [Medical Tape] Itching       Objective   Vitals: Blood pressure 134/72, pulse 77, height 5' 3" (1 6 m), weight 61 7 kg (136 lb 1 6 oz)  Physical Exam   Constitutional: She is oriented to person, place, and time  She appears well-developed and well-nourished  HENT:   Head: Normocephalic and atraumatic  Eyes: Pupils are equal, round, and reactive to light  EOM are normal    Neck: Normal range of motion  Neck supple  Cardiovascular: Normal rate  An irregularly irregular rhythm present  Pulmonary/Chest: Effort normal and breath sounds normal    Abdominal: Soft  Bowel sounds are normal    Musculoskeletal: Normal range of motion  Neurological: She is alert and oriented to person, place, and time  Skin: Skin is warm and dry  Psychiatric: She has a normal mood and affect  Labs:not applicable    Imaging: I have personally reviewed pertinent reports  ECHO: No results found for this or any previous visit      CATH/STRESS TEST:   None     EKG:   Rate controlled afib

## 2019-02-18 ENCOUNTER — CLINICAL SUPPORT (OUTPATIENT)
Dept: INTERNAL MEDICINE CLINIC | Facility: CLINIC | Age: 79
End: 2019-02-18

## 2019-02-18 DIAGNOSIS — E53.8 VITAMIN B12 DEFICIENCY: Chronic | ICD-10-CM

## 2019-02-18 PROCEDURE — 96372 THER/PROPH/DIAG INJ SC/IM: CPT | Performed by: INTERNAL MEDICINE

## 2019-02-18 RX ADMIN — CYANOCOBALAMIN 1000 MCG: 1000 INJECTION INTRAMUSCULAR; SUBCUTANEOUS at 09:34

## 2019-02-18 NOTE — PROGRESS NOTES
Patient was here today for a nurse visit for B12 administrations  Patient provided the serum ordered by Dr Katarzyna Aaron 1 0 mL of cyanocobalamin 1000 mch/mL was administered in left deltoid  Patient is aware to return in 30 days for next dose

## 2019-02-26 ENCOUNTER — OFFICE VISIT (OUTPATIENT)
Dept: MULTI SPECIALTY CLINIC | Facility: CLINIC | Age: 79
End: 2019-02-26

## 2019-02-26 VITALS
DIASTOLIC BLOOD PRESSURE: 68 MMHG | TEMPERATURE: 97.9 F | HEART RATE: 76 BPM | HEIGHT: 63 IN | SYSTOLIC BLOOD PRESSURE: 136 MMHG | WEIGHT: 138.45 LBS | BODY MASS INDEX: 24.53 KG/M2

## 2019-02-26 DIAGNOSIS — B35.1 ONYCHOMYCOSIS: Primary | ICD-10-CM

## 2019-02-26 PROCEDURE — 99212 OFFICE O/P EST SF 10 MIN: CPT | Performed by: PODIATRIST

## 2019-02-26 PROCEDURE — 11719 TRIM NAIL(S) ANY NUMBER: CPT | Performed by: PODIATRIST

## 2019-02-26 NOTE — PROGRESS NOTES
Podiatry Clinic Visit  Jm Morales 66 y o  female MRN: [id number]  Encounter: [id number]    Assessment/Plan     Assessment:  1  Onychomycosis  2  Right foot HAV     Plan:  - Patient was seen/examined  All questions and concerns addressed  - Nails x10 were sharply trimmed to normal length and thickness with a large nail nipper without any incidents  - Recommended to apply lotion b/l feet BID   - Recommended to wear supportive shoe such as new balance  - RTC in prn months    Nail removal  Date/Time: 2/26/2019 10:39 AM  Performed by: Marcelino Sarmiento DPM  Authorized by: Marcelino Sarmiento DPM     Patient location:  Clinic  Nails trimmed:     Number of nails trimmed:  10        History of Present Illness     HPI:  Jm Morales is a 66 y o  female who presents with elongated and thickened toenails  Patient is unable to cut her toenails due to arthritis and having pacemaker unables her to cut her toenails  Patient states elongated toenails cause her pain when she wear shoes  The patient denies any nausea, vomiting, fever, chills, shortness of breath, or chest pains  Review of Systems   Constitutional: Negative  HENT: Negative  Eyes: Negative  Respiratory: Negative  Cardiovascular: Negative  Gastrointestinal: Negative  Musculoskeletal: Negative   Skin: Elongated toenails  Neurological: Negative  Historical Information   Past Medical History:   Diagnosis Date    A-fib ([address] )     Anemia     Arthritis     Breast pain, right     Chest pain on breathing     Cough     Encounter for screening colonoscopy     H/O sick sinus syndrome     Heart murmur     High cholesterol     History of atrial fibrillation     Rate ontrolled  On xarelto 15mg (creatinine 50) Followed by Dr Katie Cespedes with Cardiology     History of weight loss     Report loose fitting clothes  Weight stable (3lbs difference)  Last colo showed small tubular adenoma x2  Breast biopsy last showed fibrocystic changes  TSH wnl   Will check cbc, bmp, spep   Hx of long term use of blood thinners     Hypertension     Irregular heart beat     Pacemaker     Preop examination     Shortness of breath     Viral bronchitis      Past Surgical History:   Procedure Laterality Date    BREAST BIOPSY Right 08/17/2006    ultrasound guided Percutaneous Needle Core -Benign    CATARACT EXTRACTION W/ INTRAOCULAR LENS  IMPLANT, BILATERAL      COLONOSCOPY      COLONOSCOPY N/A 5/22/2018    Procedure: COLONOSCOPY;  Surgeon: Gaby Lawrence DO;  [facility]; Service: Gastroenterology    Radha Obrien / Geovany Pride / Robertfelydevyn Ferguson Right     as a child after a fall    MAMMO STEREOTACTIC BREAST BIOPSY RIGHT (ALL INC) Right 10/2014    benign    LA CMBND ANTERPOST COLPORRAPHY W/CYSTO N/A 3/17/2016    Procedure: COLPORRHAPHY ANTERIOR POSTERIOR ;  Surgeon: Justin Overton MD;  [facility];  Service: Gynecology    LA CYSTOURETHROSCOPY N/A 3/17/2016    Procedure: Cher Louie;  Surgeon: Justin Overton MD;  [facility];  Service: Gynecology    LA ESOPHAGOGASTRODUODENOSCOPY TRANSORAL DIAGNOSTIC N/A 4/16/2018    Procedure: EGD AND COLONOSCOPY;  Surgeon: Gaby Lawrence DO;  [facility];   Service: Gastroenterology    LA [place] LIG N/A 3/17/2016    Procedure: COLPOPEXY VAGINAL EXTRAPERITONEAL (VEC) ANTERIOR ;  Surgeon: Justin Overton MD;  [facility];  Service: Gynecology    LA SLING OPER STRES INCONTINENCE N/A 3/17/2016    Procedure: INSERTION PUBOVAGINAL SLING SINGLE INCISION ;  Surgeon: Justin Overton MD;  [facility];  Service: Gynecology     Social History   Social History     Substance and Sexual Activity   Alcohol Use No     Social History     Substance and Sexual Activity   Drug Use No     Social History     Tobacco Use   Smoking Status Never Smoker   Smokeless Tobacco Never Used     Family History:   Family History   Problem Relation Age of Onset    Diabetes Mother     Breast cancer Sister 48       Meds/Allergies     (Not in a hospital admission)  Allergies   Allergen Reactions    Other Itching     Bandaids    Tape [Medical Tape] Itching       Objective     Current Vitals:   Blood Pressure: 136/68 (02/26/19 0946)  Pulse: 76 (02/26/19 0946)  Temperature: 97 9 °F (36 6 °C) (02/26/19 0946)  Temp Source: Oral (02/26/19 0946)  Height: 5' 3" (160 cm) (02/26/19 0946)  Weight - Scale: 62 8 kg (138 lb 7 2 oz) (02/26/19 0946)        /68 (BP Location: Right arm, Patient Position: Sitting, Cuff Size: Adult)   Pulse 76   Temp 97 9 °F (36 6 °C) (Oral)   Ht 5' 3" (1 6 m)   Wt 62 8 kg (138 lb 7 2 oz)   BMI 24 53 kg/m²       Lower Extremity Exam:    Musculoskeletal:  MMT is 5/5 to all compartments of the LE, +0/4 edema B/L, Digital ROM is intact, B/l lower extremity ROM with in normal limits  Pulses:   R DP is +1/4, R PT is +1/4, L DP is +1/4, L PT is +0/4, CFT< 3sec to all digits  Pedal hair is Present     Skin:  B/l toenails elongated, dystrophic, thickened with subungual debris noted  No open Lesions  Skin of the LE is normal texture, turgor  Neurologic:  Gross sensation is intact   Protective sensation is Intact

## 2019-03-01 ENCOUNTER — TELEPHONE (OUTPATIENT)
Dept: CARDIOLOGY CLINIC | Facility: CLINIC | Age: 79
End: 2019-03-01

## 2019-03-05 DIAGNOSIS — I10 ESSENTIAL HYPERTENSION: ICD-10-CM

## 2019-03-05 RX ORDER — LOSARTAN POTASSIUM 50 MG
25 TABLET ORAL DAILY
Qty: 45 TABLET | Refills: 3 | Status: SHIPPED | OUTPATIENT
Start: 2019-03-05 | End: 2019-03-05 | Stop reason: SDUPTHER

## 2019-03-05 NOTE — TELEPHONE ENCOUNTER
[facility] did not have enough to fill, need to send to [facility] [id number] [address]

## 2019-03-06 RX ORDER — LOSARTAN POTASSIUM 50 MG
25 TABLET ORAL DAILY
Qty: 45 TABLET | Refills: 3 | Status: SHIPPED | OUTPATIENT
Start: 2019-03-06 | End: 2019-03-07 | Stop reason: SDUPTHER

## 2019-03-07 DIAGNOSIS — I10 ESSENTIAL HYPERTENSION: ICD-10-CM

## 2019-03-07 RX ORDER — LOSARTAN POTASSIUM 50 MG
50 TABLET ORAL DAILY
Qty: 90 TABLET | Refills: 3 | Status: SHIPPED | OUTPATIENT
Start: 2019-03-07 | End: 2019-05-02 | Stop reason: SDUPTHER

## 2019-03-12 ENCOUNTER — TELEPHONE (OUTPATIENT)
Dept: GASTROENTEROLOGY | Facility: CLINIC | Age: 79
End: 2019-03-12

## 2019-03-14 ENCOUNTER — TELEPHONE (OUTPATIENT)
Dept: INTERNAL MEDICINE CLINIC | Facility: CLINIC | Age: 79
End: 2019-03-14

## 2019-03-14 ENCOUNTER — TELEPHONE (OUTPATIENT)
Dept: GASTROENTEROLOGY | Facility: MEDICAL CENTER | Age: 79
End: 2019-03-14

## 2019-03-14 NOTE — TELEPHONE ENCOUNTER
Please review (MEDICATION CLEARANCES) form placed in the (RED) team folder in the provider flow station  ([phone]) when form is complete  If the form requires a signature by a MD or DO, please review it with them and sign the form  Please place the form in the (RED) team folder in the Clinical flow station at the [facility] and reply to this task to the Clinical Pool

## 2019-03-14 NOTE — TELEPHONE ENCOUNTER
----- Message from Felice Patel DO sent at 3/12/2019  7:36 AM EDT -----  Please reschedule colon and pt must hold xarelto prior to next colonoscopy   ----- Message -----  From: Michael Gamboa MD  Sent: 3/11/2019   5:16 PM  To: [place] Patient, GATITO, Felice Patel DO, #    Pt of Dr Nola Gomez    Patient called me, she unfortunately forgot to stop taking her Xarelto  She was scheduled for repeat colonoscopy due to large polyp in the past     I recommended that she reschedule her colonoscopy  Unfortunately she already drank half the prep  Please contact her to reschedule procedure

## 2019-03-18 ENCOUNTER — CLINICAL SUPPORT (OUTPATIENT)
Dept: INTERNAL MEDICINE CLINIC | Facility: CLINIC | Age: 79
End: 2019-03-18

## 2019-03-18 DIAGNOSIS — E53.8 VITAMIN B12 DEFICIENCY: Chronic | ICD-10-CM

## 2019-03-18 PROCEDURE — 96372 THER/PROPH/DIAG INJ SC/IM: CPT | Performed by: INTERNAL MEDICINE

## 2019-03-18 RX ADMIN — CYANOCOBALAMIN 1000 MCG: 1000 INJECTION INTRAMUSCULAR; SUBCUTANEOUS at 10:00

## 2019-03-18 NOTE — PROGRESS NOTES
Patient seen today for nurse visit for B12 injection  Patient provided serum ordered by Dr Katiuska Evans  1 0mL of Cyanocalamin 1000 mcg/mL  Administered in patient right deltoid  Patient is aware to return in 30 days for next dose

## 2019-03-22 ENCOUNTER — ANESTHESIA EVENT (OUTPATIENT)
Dept: PERIOP | Facility: AMBULARY SURGERY CENTER | Age: 79
End: 2019-03-22
Payer: COMMERCIAL

## 2019-03-26 DIAGNOSIS — I10 HYPERTENSION, UNSPECIFIED TYPE: ICD-10-CM

## 2019-03-26 RX ORDER — NEBIVOLOL 5 MG/1
5 TABLET ORAL DAILY
Qty: 90 TABLET | Refills: 3 | Status: SHIPPED | OUTPATIENT
Start: 2019-03-26 | End: 2020-07-15 | Stop reason: SDUPTHER

## 2019-04-01 ENCOUNTER — OFFICE VISIT (OUTPATIENT)
Dept: INTERNAL MEDICINE CLINIC | Facility: CLINIC | Age: 79
End: 2019-04-01

## 2019-04-01 VITALS
TEMPERATURE: 97.6 F | HEIGHT: 63 IN | BODY MASS INDEX: 23.44 KG/M2 | HEART RATE: 80 BPM | WEIGHT: 132.28 LBS | DIASTOLIC BLOOD PRESSURE: 70 MMHG | SYSTOLIC BLOOD PRESSURE: 126 MMHG

## 2019-04-01 DIAGNOSIS — I48.20 CHRONIC ATRIAL FIBRILLATION (HCC): Primary | ICD-10-CM

## 2019-04-01 DIAGNOSIS — J30.89 ALLERGIC RHINITIS DUE TO OTHER ALLERGIC TRIGGER, UNSPECIFIED SEASONALITY: ICD-10-CM

## 2019-04-01 PROCEDURE — 99213 OFFICE O/P EST LOW 20 MIN: CPT | Performed by: INTERNAL MEDICINE

## 2019-04-01 RX ORDER — LORATADINE 10 MG/1
10 TABLET ORAL DAILY
Qty: 30 TABLET | Refills: 2 | Status: SHIPPED | OUTPATIENT
Start: 2019-04-01 | End: 2019-06-30 | Stop reason: SDUPTHER

## 2019-04-04 ENCOUNTER — HOSPITAL ENCOUNTER (OUTPATIENT)
Facility: AMBULARY SURGERY CENTER | Age: 79
Setting detail: OUTPATIENT SURGERY
Discharge: HOME | End: 2019-04-04
Attending: INTERNAL MEDICINE | Admitting: INTERNAL MEDICINE
Payer: COMMERCIAL

## 2019-04-04 ENCOUNTER — ANESTHESIA (OUTPATIENT)
Dept: PERIOP | Facility: AMBULARY SURGERY CENTER | Age: 79
End: 2019-04-04
Payer: COMMERCIAL

## 2019-04-04 VITALS
HEIGHT: 63 IN | TEMPERATURE: 97.5 F | OXYGEN SATURATION: 98 % | HEART RATE: 66 BPM | RESPIRATION RATE: 18 BRPM | SYSTOLIC BLOOD PRESSURE: 164 MMHG | DIASTOLIC BLOOD PRESSURE: 77 MMHG | WEIGHT: 123 LBS | BODY MASS INDEX: 21.79 KG/M2

## 2019-04-04 DIAGNOSIS — D36.9 TUBULAR ADENOMA: ICD-10-CM

## 2019-04-04 PROCEDURE — ERR1 ERRONEOUS ENCOUNTER-DISREGARD: Performed by: INTERNAL MEDICINE

## 2019-04-04 PROCEDURE — 88305 TISSUE EXAM BY PATHOLOGIST: CPT | Performed by: PATHOLOGY

## 2019-04-04 PROCEDURE — 45390 COLONOSCOPY W/RESECTION: CPT | Performed by: INTERNAL MEDICINE

## 2019-04-04 RX ORDER — SODIUM CHLORIDE 9 MG/ML
125 INJECTION, SOLUTION INTRAVENOUS CONTINUOUS
Status: DISCONTINUED | OUTPATIENT
Start: 2019-04-04 | End: 2019-04-04 | Stop reason: HOSPADM

## 2019-04-04 RX ORDER — LIDOCAINE HYDROCHLORIDE 10 MG/ML
INJECTION, SOLUTION INFILTRATION; PERINEURAL AS NEEDED
Status: DISCONTINUED | OUTPATIENT
Start: 2019-04-04 | End: 2019-04-04 | Stop reason: SURG

## 2019-04-04 RX ORDER — PROPOFOL 10 MG/ML
INJECTION, EMULSION INTRAVENOUS AS NEEDED
Status: DISCONTINUED | OUTPATIENT
Start: 2019-04-04 | End: 2019-04-04 | Stop reason: SURG

## 2019-04-04 RX ADMIN — PROPOFOL 20 MG: 10 INJECTION, EMULSION INTRAVENOUS at 10:27

## 2019-04-04 RX ADMIN — PROPOFOL 50 MG: 10 INJECTION, EMULSION INTRAVENOUS at 11:02

## 2019-04-04 RX ADMIN — PROPOFOL 50 MG: 10 INJECTION, EMULSION INTRAVENOUS at 10:56

## 2019-04-04 RX ADMIN — PROPOFOL 20 MG: 10 INJECTION, EMULSION INTRAVENOUS at 10:25

## 2019-04-04 RX ADMIN — PROPOFOL 20 MG: 10 INJECTION, EMULSION INTRAVENOUS at 10:35

## 2019-04-04 RX ADMIN — PROPOFOL 80 MG: 10 INJECTION, EMULSION INTRAVENOUS at 10:19

## 2019-04-04 RX ADMIN — LIDOCAINE HYDROCHLORIDE ANHYDROUS 50 MG: 10 INJECTION, SOLUTION INFILTRATION at 10:19

## 2019-04-04 RX ADMIN — PROPOFOL 30 MG: 10 INJECTION, EMULSION INTRAVENOUS at 10:29

## 2019-04-04 RX ADMIN — PROPOFOL 30 MG: 10 INJECTION, EMULSION INTRAVENOUS at 10:31

## 2019-04-04 RX ADMIN — PROPOFOL 30 MG: 10 INJECTION, EMULSION INTRAVENOUS at 10:39

## 2019-04-04 RX ADMIN — PROPOFOL 30 MG: 10 INJECTION, EMULSION INTRAVENOUS at 10:49

## 2019-04-04 RX ADMIN — PROPOFOL 40 MG: 10 INJECTION, EMULSION INTRAVENOUS at 10:45

## 2019-04-04 RX ADMIN — SODIUM CHLORIDE: 0.9 INJECTION, SOLUTION INTRAVENOUS at 10:01

## 2019-04-08 ENCOUNTER — IN-CLINIC DEVICE VISIT (OUTPATIENT)
Dept: CARDIOLOGY CLINIC | Facility: CLINIC | Age: 79
End: 2019-04-08
Payer: COMMERCIAL

## 2019-04-08 DIAGNOSIS — Z95.0 CARDIAC PACEMAKER IN SITU: ICD-10-CM

## 2019-04-08 DIAGNOSIS — I49.5 SICK SINUS SYNDROME (HCC): Primary | ICD-10-CM

## 2019-04-08 DIAGNOSIS — Z45.018 ADJUSTMENT AND MANAGEMENT OF CARDIAC PACEMAKER: ICD-10-CM

## 2019-04-08 PROCEDURE — 93279 PRGRMG DEV EVAL PM/LDLS PM: CPT | Performed by: INTERNAL MEDICINE

## 2019-04-16 ENCOUNTER — OFFICE VISIT (OUTPATIENT)
Dept: MULTI SPECIALTY CLINIC | Facility: CLINIC | Age: 79
End: 2019-04-16

## 2019-04-16 ENCOUNTER — TELEPHONE (OUTPATIENT)
Dept: CARDIOLOGY CLINIC | Facility: CLINIC | Age: 79
End: 2019-04-16

## 2019-04-16 ENCOUNTER — TELEPHONE (OUTPATIENT)
Dept: INTERNAL MEDICINE CLINIC | Facility: CLINIC | Age: 79
End: 2019-04-16

## 2019-04-16 VITALS
WEIGHT: 131.39 LBS | BODY MASS INDEX: 23.28 KG/M2 | HEIGHT: 63 IN | SYSTOLIC BLOOD PRESSURE: 118 MMHG | TEMPERATURE: 98 F | HEART RATE: 88 BPM | DIASTOLIC BLOOD PRESSURE: 72 MMHG

## 2019-04-16 DIAGNOSIS — S90.822D: ICD-10-CM

## 2019-04-16 DIAGNOSIS — M79.675 PAIN DUE TO ONYCHOMYCOSIS OF TOENAILS OF BOTH FEET: Primary | ICD-10-CM

## 2019-04-16 DIAGNOSIS — B35.1 ONYCHOMYCOSIS: Primary | ICD-10-CM

## 2019-04-16 DIAGNOSIS — M79.674 PAIN DUE TO ONYCHOMYCOSIS OF TOENAILS OF BOTH FEET: Primary | ICD-10-CM

## 2019-04-16 DIAGNOSIS — I10 ESSENTIAL HYPERTENSION: ICD-10-CM

## 2019-04-16 DIAGNOSIS — B35.1 PAIN DUE TO ONYCHOMYCOSIS OF TOENAILS OF BOTH FEET: Primary | ICD-10-CM

## 2019-04-16 PROCEDURE — 99212 OFFICE O/P EST SF 10 MIN: CPT | Performed by: PODIATRIST

## 2019-04-16 PROCEDURE — 11719 TRIM NAIL(S) ANY NUMBER: CPT | Performed by: PODIATRIST

## 2019-04-18 ENCOUNTER — CLINICAL SUPPORT (OUTPATIENT)
Dept: INTERNAL MEDICINE CLINIC | Facility: CLINIC | Age: 79
End: 2019-04-18

## 2019-04-18 DIAGNOSIS — E53.8 VITAMIN B12 DEFICIENCY: Chronic | ICD-10-CM

## 2019-04-18 PROCEDURE — 96372 THER/PROPH/DIAG INJ SC/IM: CPT | Performed by: INTERNAL MEDICINE

## 2019-04-18 RX ADMIN — CYANOCOBALAMIN 1000 MCG: 1000 INJECTION INTRAMUSCULAR; SUBCUTANEOUS at 10:04

## 2019-05-01 ENCOUNTER — TELEPHONE (OUTPATIENT)
Dept: GASTROENTEROLOGY | Facility: AMBULARY SURGERY CENTER | Age: 79
End: 2019-05-01

## 2019-05-01 ENCOUNTER — TELEPHONE (OUTPATIENT)
Dept: INTERNAL MEDICINE CLINIC | Facility: CLINIC | Age: 79
End: 2019-05-01

## 2019-05-02 DIAGNOSIS — E53.8 VITAMIN B12 DEFICIENCY: Primary | ICD-10-CM

## 2019-05-02 DIAGNOSIS — I10 HYPERTENSION, UNSPECIFIED TYPE: ICD-10-CM

## 2019-05-02 DIAGNOSIS — I10 ESSENTIAL HYPERTENSION: ICD-10-CM

## 2019-05-02 RX ORDER — SPIRONOLACTONE 25 MG/1
25 TABLET ORAL DAILY
Qty: 90 TABLET | Refills: 3 | Status: SHIPPED | OUTPATIENT
Start: 2019-05-02 | End: 2020-07-07 | Stop reason: SDUPTHER

## 2019-05-02 RX ORDER — INDAPAMIDE 2.5 MG/1
2.5 TABLET, FILM COATED ORAL EVERY MORNING
Qty: 90 TABLET | Refills: 3 | Status: SHIPPED | OUTPATIENT
Start: 2019-05-02 | End: 2020-11-25

## 2019-05-02 RX ORDER — LOSARTAN POTASSIUM 50 MG
50 TABLET ORAL DAILY
Qty: 90 TABLET | Refills: 3 | Status: SHIPPED | OUTPATIENT
Start: 2019-05-02 | End: 2020-07-07 | Stop reason: SDUPTHER

## 2019-05-02 RX ORDER — CYANOCOBALAMIN 1000 UG/ML
1000 INJECTION INTRAMUSCULAR; SUBCUTANEOUS
Status: DISCONTINUED | OUTPATIENT
Start: 2019-05-02 | End: 2019-05-09

## 2019-05-03 ENCOUNTER — TELEPHONE (OUTPATIENT)
Dept: CARDIOLOGY CLINIC | Facility: CLINIC | Age: 79
End: 2019-05-03

## 2019-05-08 ENCOUNTER — TELEPHONE (OUTPATIENT)
Dept: INTERNAL MEDICINE CLINIC | Facility: CLINIC | Age: 79
End: 2019-05-08

## 2019-05-08 DIAGNOSIS — E53.8 VITAMIN B12 DEFICIENCY: Primary | Chronic | ICD-10-CM

## 2019-05-20 ENCOUNTER — OFFICE VISIT (OUTPATIENT)
Dept: INTERNAL MEDICINE CLINIC | Facility: CLINIC | Age: 79
End: 2019-05-20

## 2019-05-20 VITALS
TEMPERATURE: 97.5 F | HEIGHT: 62 IN | SYSTOLIC BLOOD PRESSURE: 110 MMHG | HEART RATE: 80 BPM | BODY MASS INDEX: 24.34 KG/M2 | WEIGHT: 132.28 LBS | DIASTOLIC BLOOD PRESSURE: 64 MMHG

## 2019-05-20 DIAGNOSIS — I48.20 CHRONIC ATRIAL FIBRILLATION (HCC): ICD-10-CM

## 2019-05-20 DIAGNOSIS — J06.9 VIRAL URI WITH COUGH: Primary | ICD-10-CM

## 2019-05-20 DIAGNOSIS — E53.8 VITAMIN B12 DEFICIENCY: Chronic | ICD-10-CM

## 2019-05-20 DIAGNOSIS — I10 ESSENTIAL HYPERTENSION: Chronic | ICD-10-CM

## 2019-05-20 DIAGNOSIS — D12.6 TUBULOVILLOUS ADENOMA OF COLON: ICD-10-CM

## 2019-05-20 PROBLEM — K57.30 DIVERTICULOSIS OF COLON: Status: ACTIVE | Noted: 2019-05-20

## 2019-05-20 PROCEDURE — 99213 OFFICE O/P EST LOW 20 MIN: CPT | Performed by: INTERNAL MEDICINE

## 2019-05-20 PROCEDURE — 3725F SCREEN DEPRESSION PERFORMED: CPT | Performed by: INTERNAL MEDICINE

## 2019-05-20 RX ORDER — CYANOCOBALAMIN 1000 UG/ML
1000 INJECTION INTRAMUSCULAR; SUBCUTANEOUS ONCE
Status: COMPLETED | OUTPATIENT
Start: 2019-05-20 | End: 2019-05-20

## 2019-05-20 RX ADMIN — CYANOCOBALAMIN 1000 MCG: 1000 INJECTION INTRAMUSCULAR; SUBCUTANEOUS at 11:01

## 2019-05-21 NOTE — PROGRESS NOTES
Chief Complaint  Pt was seen today for a nurse visit and was given the B12 injection ordered by Geoffrey Mcfarland on 10/12/2015  Pt is aware to return in next month  Active Problems    1  Allergic rhinitis due to pollen (477 0) (J30 1)   2  Chest pain on breathing (786 52) (R07 1)   3  Chronic atrial fibrillation (427 31) (I48 2)   4  Chronic kidney disease, stage 3 (585 3) (N18 3)   5  Cough (786 2) (R05)   6  Cystocele, midline (618 01) (N81 11)   7  Encounter for Holly catheter removal (V53 6) (Z46 6)   8  GERD without esophagitis (530 81) (K21 9)   9  History of atrial fibrillation (V12 59) (Z86 79)   10  Hyperlipidemia (272 4) (E78 5)   11  Hypertension (401 9) (I10)   12  Lateral epicondylitis, right elbow (726 32) (M77 11)   13  Left atrial enlargement (429 3) (I51 7)   14  Left renal artery stenosis (440 1) (I70 1)   15  Low back pain (724 2) (M54 5)   16  Lump or mass in breast (611 72) (N63)   17  Need for vaccination with 13-polyvalent pneumococcal conjugate vaccine (V03 82) (Z23)   18  Osteoarthritis of hip (715 95) (M16 9)   19  Osteoarthrosis, 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)   20  Osteopenia (733 90) (M85 80)   21  Pernicious anemia (281 0) (D51 0)   22  Preop examination (V72 84) (Z01 818)   23  Right knee pain (719 46) (M25 561)   24  Seasonal affective disorder (296 99) (F39)   25  Shortness of breath (786 05) (R06 02)   26  Skin pustule (686 9) (L08 9)   27  Tubulovillous adenoma (229 9) (D36 9)   28  Vaginal wall prolapse (618 00) (N81 10)   29  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)   30  Visit for screening mammogram (V76 12) (Z12 31)   31  Vitamin B12 deficiency (266 2) (E53 8)   32  Weight loss (783 21) (R63 4)    Current Meds   1  Benicar 40 MG Oral Tablet; TAKE 1 TABLET DAILY; Therapy: 97Hfm4462 to (Last Rx:15Nov2013)  Requested for: 09LYX9175 Ordered   2  Bystolic 5 MG Oral Tablet; Therapy: (Recorded:18Feb2016) to Recorded   3  Calcium 500 +D 500-400 MG-UNIT Oral Tablet;    Therapy: Pt is scheduled for a fuv on 5/28/19 w/ angelia and needs refills.   (HNYOUNQI:84HIR3264) to Recorded   4  Cyanocobalamin 1000 MCG/ML Injection Solution; INJECT 1 ML INTRAMUSCULARLY   ONCE A MONTH; Last Rx:12Oct2015 Ordered   5  Indapamide 2 5 MG Oral Tablet; TAKE 1 TABLET DAILY; Therapy: 17BZJ1074 to (Evaluate:26Jun2016); Last Rx:53Sej7167 Ordered   6  Magnesium Oxide 400 MG Oral Capsule; Take 2 daily; Therapy: 69XUA3276 to (Last Rx:18Feb2016)  Requested for: 07SYA6134 Ordered   7  Magnesium Oxide 400 MG Oral Capsule; TAKE AS DIRECTED; Therapy: 00QAX7488 to (Last Rx:34Kmq7462) Ordered   8  Pantoprazole Sodium 40 MG Oral Tablet Delayed Release; TAKE 1 TABLET BY MOUTH   ONCE DAILY; Therapy: 30KBR3688 to (Sangeeta Weston)  Requested for: 37IRN2682; Last   Rx:31May2016 Ordered   9  Premarin 0 625 MG/GM Vaginal Cream; Aplique un poco de crema en el dedo y eche en   la vagina todas las noches por 2 semanas y despues wilver noche si y Foster Mouse no  No use   aplicador; Therapy: 35BMG0984 to (Last Rx:59Viy5532) Ordered   10  Spironolactone 25 MG Oral Tablet; Therapy: 98Maf0496 to (Last Yashira Au)  Requested for: 76Ven3906 Ordered   11  Verapamil HCl - 120 MG Oral Tablet; 1 tab po qod; Therapy: 06WZU3095 to (Last Rx:18Feb2016) Ordered   12  Xarelto 15 MG Oral Tablet; Take 1 tablet daily; Therapy: 74PML8791 to (Evaluate:84Rnb0029)  Requested for: 14VTJ6985; Last    Rx:04Jan2016 Ordered   13  ZyrTEC Allergy 10 MG Oral Capsule; take 1 capsule daily; Therapy: 61VNG1705 to (Evaluate:28Oct2016)  Requested for: 65CCH6508; Last    Rx:66Sfg9063 Ordered    Allergies    1  No Known Drug Allergies    2  Adhesive Tape   3   No Known Environmental Allergies    Plan  Vitamin B12 deficiency    · Cyanocobalamin 1000 MCG/ML Injection Solution    Future Appointments    Date/Time Provider Specialty Site   07/11/2016 09:00 AM [place], PCP Nurse Schedule   [facility] PCP   07/19/2016 10:10 AM Specialty Clinic, [facility]   05/25/2017 01:00 PM [address], [place] Room  [facility] [place]     Message   Recorded as Task   Date: 06/07/2016 10:28 AM, Created By: Memo Austin   Task Name: Co-Sign Note   Assigned To:  Memo Austin   Regarding Patient: Uche Etienne, Status: Active   CommentDrury Scot - 07 Jun 2016 10:28 AM     TASK CREATED     Signatures   Electronically signed by : Jennifer Rich, ; Jun 7 2016 10:28AM EST                       (Author)    Electronically signed by : Tye Horvath MD; Jun 7 2016 12:42PM EST                       (Co-participant)    Electronically signed by : KARLI Judd ; Jun 7 2016  1:28PM EST                       (Co-author)

## 2019-06-20 ENCOUNTER — CLINICAL SUPPORT (OUTPATIENT)
Dept: INTERNAL MEDICINE CLINIC | Facility: CLINIC | Age: 79
End: 2019-06-20

## 2019-06-20 ENCOUNTER — PATIENT OUTREACH (OUTPATIENT)
Dept: INTERNAL MEDICINE CLINIC | Facility: CLINIC | Age: 79
End: 2019-06-20

## 2019-06-20 DIAGNOSIS — E53.8 VITAMIN B12 DEFICIENCY: Chronic | ICD-10-CM

## 2019-06-20 DIAGNOSIS — IMO0001 USES COMMUNITY RESOURCES: Primary | ICD-10-CM

## 2019-06-20 PROCEDURE — 96372 THER/PROPH/DIAG INJ SC/IM: CPT | Performed by: INTERNAL MEDICINE

## 2019-06-20 RX ORDER — CYANOCOBALAMIN 1000 UG/ML
1000 INJECTION INTRAMUSCULAR; SUBCUTANEOUS
Status: DISCONTINUED | OUTPATIENT
Start: 2019-06-20 | End: 2020-05-06

## 2019-06-20 RX ADMIN — CYANOCOBALAMIN 1000 MCG: 1000 INJECTION INTRAMUSCULAR; SUBCUTANEOUS at 11:15

## 2019-06-30 DIAGNOSIS — J30.89 ALLERGIC RHINITIS DUE TO OTHER ALLERGIC TRIGGER, UNSPECIFIED SEASONALITY: ICD-10-CM

## 2019-07-01 RX ORDER — LORATADINE 10 MG/1
TABLET ORAL
Qty: 30 TABLET | Refills: 2 | Status: SHIPPED | OUTPATIENT
Start: 2019-07-01 | End: 2019-11-13 | Stop reason: SDUPTHER

## 2019-07-09 ENCOUNTER — REMOTE DEVICE CLINIC VISIT (OUTPATIENT)
Dept: CARDIOLOGY CLINIC | Facility: CLINIC | Age: 79
End: 2019-07-09
Payer: COMMERCIAL

## 2019-07-09 DIAGNOSIS — Z95.0 CARDIAC PACEMAKER IN SITU: Primary | ICD-10-CM

## 2019-07-09 PROCEDURE — 93296 REM INTERROG EVL PM/IDS: CPT | Performed by: INTERNAL MEDICINE

## 2019-07-09 PROCEDURE — 93294 REM INTERROG EVL PM/LDLS PM: CPT | Performed by: INTERNAL MEDICINE

## 2019-07-09 NOTE — PROGRESS NOTES
Results for orders placed or performed in visit on 07/09/19   Cardiac EP device report    Narrative    MDT VVI PPM  CARELINK TRANSMISSION: BATTERY VOLTAGE ADEQUATE (12 3 YRS)  -54% (>40% [email or web address])  ALL AVAILABLE  LEAD PARAMETERS WITHIN NORMAL LIMITS  NO SIGNIFICANT HIGH RATE EPISODES  NORMAL DEVICE FUNCTION   GV

## 2019-07-16 ENCOUNTER — OFFICE VISIT (OUTPATIENT)
Dept: MULTI SPECIALTY CLINIC | Facility: CLINIC | Age: 79
End: 2019-07-16

## 2019-07-16 VITALS
WEIGHT: 134.92 LBS | HEIGHT: 62 IN | DIASTOLIC BLOOD PRESSURE: 76 MMHG | BODY MASS INDEX: 24.83 KG/M2 | TEMPERATURE: 97.9 F | HEART RATE: 80 BPM | SYSTOLIC BLOOD PRESSURE: 134 MMHG

## 2019-07-16 DIAGNOSIS — S90.822A BLISTER OF PLANTAR ASPECT OF LEFT FOOT, INITIAL ENCOUNTER: ICD-10-CM

## 2019-07-16 DIAGNOSIS — B35.1 ONYCHOMYCOSIS: Primary | ICD-10-CM

## 2019-07-16 DIAGNOSIS — I48.20 CHRONIC ATRIAL FIBRILLATION (HCC): ICD-10-CM

## 2019-07-16 DIAGNOSIS — B35.1 PAIN DUE TO ONYCHOMYCOSIS OF TOENAIL OF LEFT FOOT: ICD-10-CM

## 2019-07-16 DIAGNOSIS — M79.675 PAIN DUE TO ONYCHOMYCOSIS OF TOENAIL OF LEFT FOOT: ICD-10-CM

## 2019-07-16 PROCEDURE — 99213 OFFICE O/P EST LOW 20 MIN: CPT | Performed by: PODIATRIST

## 2019-07-16 PROCEDURE — 11721 DEBRIDE NAIL 6 OR MORE: CPT | Performed by: PODIATRIST

## 2019-07-16 PROCEDURE — 10160 PNXR ASPIR ABSC HMTMA BULLA: CPT | Performed by: PODIATRIST

## 2019-07-16 RX ORDER — VERAPAMIL HYDROCHLORIDE 120 MG/1
120 CAPSULE, EXTENDED RELEASE ORAL
Qty: 90 CAPSULE | Refills: 1 | Status: SHIPPED | OUTPATIENT
Start: 2019-07-16 | End: 2020-01-09

## 2019-07-16 NOTE — PROGRESS NOTES
Incision and Drainage  Date/Time: 7/16/2019 4:54 PM  Performed by: Komal Jones DPM  Authorized by: Komal Jones DPM     Patient location:  Bedside  Other Assisting Provider: Yes (comment)    Consent:     Consent obtained:  Verbal    Consent given by:  Patient    Risks discussed:  Pain, infection and bleeding    Alternatives discussed:  Alternative treatment  Location:     Type:  Fluid collection    Location:  Lower extremity    Lower extremity location:  L foot  Procedure details:     Complexity:  Simple    Needle aspiration: no      Incision types:  Stab incision    Scalpel blade:  15    Approach:  Puncture    Incision depth:  Superficial    Drainage:  Purulent    Drainage amount:  Scant    Wound treatment:  Wound left open    Packing materials:  None  Post-procedure details:     Patient tolerance of procedure:   Tolerated well, no immediate complications

## 2019-07-16 NOTE — PATIENT INSTRUCTIONS
Change dressing on left plantar foot daily for 3 days  Ensure area is clean and dry  Apply dime sized amount of triple antibiotic and dress with 4x4 gauze and coban

## 2019-07-16 NOTE — PROGRESS NOTES
Assessment/Plan:     Diagnoses and all orders for this visit:    Onychomycosis  1  Sharply debrided toenails x10 using a nipper to normal length and thickness without incident  2  Instructed the patient to continue using Ciclopirox topical as at the bilateral hallucal nail plates  3  Educated the patient on the importance of proper foot hygiene  Pain due to onychomycosis of toenail of left foot  - See plan above    Blister of plantar aspect of left foot, initial encounter  4  Lanced blister located on the plantar aspect of the left foot with a 15 blade  White opaque fluid was expressed  De-roofed lesion with 15 blade to remove all non-viable superficial skin  Procedure was well tolerated and was without incident  5  Applied dressing consisting of triple antibiotic ointment, DSD, and coban  Instructed patient to change dressing daily for 3 days     - She will return to clinic in 3 months for follow up  Instructed her to call sooner or go to ED should symptoms of acute infection arise  Subjective:      Patient ID: Adela Bray is a 66 y o  female  Ms Zarina Whitfield is a 19-year-old female patient who presents for follow-up on her mycotic hallucal toenails bilaterally  Previous office visits consisted of sharp debridement of nails with her most recent office visit resulted in a prescription for ciclopirox topical lacquer for application at the bilateral hallucal nail plates  She states that she is still unhappy with the appearance of her nails and is experiencing some pain on direct compression of the left hallucal nail, however, she is noticing improvement at the proximal nail plates since beginning in the ciclopirox  She also brings to our attention a blister located on the plantar aspect of the lateral left foot  She does not recall any trauma to the area  She states the lesion spontaneously appeared about 4 days ago    She does find the lesion to be mildly painful upon direct palpation of the blister  She denies n/v/f/sob  The following portions of the patient's history were reviewed and updated as appropriate: allergies, current medications, past family history, past medical history, past social history, past surgical history and problem list     Review of Systems   Constitutional: Negative  Respiratory: Negative  Cardiovascular: Negative  Musculoskeletal: Negative  Skin:        Fungal nails of hallux bilaterally, blister on plantar left foot   Neurological: Negative  Objective:      /76 (BP Location: Right arm, Patient Position: Sitting, Cuff Size: Adult)   Pulse 80   Temp 97 9 °F (36 6 °C) (Oral)   Ht 5' 2" (1 575 m)   Wt 61 2 kg (134 lb 14 7 oz)   BMI 24 68 kg/m²          Physical Exam   Cardiovascular: Normal pulses  Pulses:       Dorsalis pedis pulses are 2+ on the right side, and 2+ on the left side  Posterior tibial pulses are 2+ on the right side, and 2+ on the left side  CRT < 3 secs at the digits bilaterally  Pedal hair present bilaterally  No edema noted of the BLE  Musculoskeletal:   No gross deformities noted  Neurological:   Gross and protective sensation are intact  Skin:   Toenails x10 are elongated  The bilateral hallucal nails are elongated, thickened, dystrophic, yellow-brown in color, and have subungual debris  Mild pain on palpation of the left hallucal nail plate noted  White fluid filled blister lesion noted on the plantar aspect of the left foot  Blister is painful on palpation of lesion and ervin-lesion area           Left foot- plantar

## 2019-07-22 ENCOUNTER — CLINICAL SUPPORT (OUTPATIENT)
Dept: INTERNAL MEDICINE CLINIC | Facility: CLINIC | Age: 79
End: 2019-07-22

## 2019-07-22 DIAGNOSIS — E53.8 VITAMIN B12 DEFICIENCY: Primary | ICD-10-CM

## 2019-07-22 RX ADMIN — CYANOCOBALAMIN 1000 MCG: 1000 INJECTION INTRAMUSCULAR; SUBCUTANEOUS at 09:56

## 2019-07-22 NOTE — PATIENT INSTRUCTIONS
Patient seen here today on nurse schedule for monthly B12 injection  1 0mL administered in patients left deltoid  Patient to return again in 1 month  Patient tolerated well

## 2019-08-22 ENCOUNTER — CLINICAL SUPPORT (OUTPATIENT)
Dept: INTERNAL MEDICINE CLINIC | Facility: CLINIC | Age: 79
End: 2019-08-22

## 2019-08-22 DIAGNOSIS — E53.8 VITAMIN B12 DEFICIENCY: Primary | ICD-10-CM

## 2019-08-22 RX ADMIN — CYANOCOBALAMIN 1000 MCG: 1000 INJECTION INTRAMUSCULAR; SUBCUTANEOUS at 08:49

## 2019-09-23 ENCOUNTER — CLINICAL SUPPORT (OUTPATIENT)
Dept: INTERNAL MEDICINE CLINIC | Facility: CLINIC | Age: 79
End: 2019-09-23

## 2019-09-23 DIAGNOSIS — Z23 NEED FOR PROPHYLACTIC VACCINATION AND INOCULATION AGAINST INFLUENZA: Primary | ICD-10-CM

## 2019-09-23 PROCEDURE — 96372 THER/PROPH/DIAG INJ SC/IM: CPT | Performed by: INTERNAL MEDICINE

## 2019-09-23 PROCEDURE — 90662 IIV NO PRSV INCREASED AG IM: CPT | Performed by: INTERNAL MEDICINE

## 2019-09-23 PROCEDURE — G0008 ADMIN INFLUENZA VIRUS VAC: HCPCS | Performed by: INTERNAL MEDICINE

## 2019-09-23 RX ADMIN — CYANOCOBALAMIN 1000 MCG: 1000 INJECTION INTRAMUSCULAR; SUBCUTANEOUS at 09:49

## 2019-09-23 NOTE — PROGRESS NOTES
Patient seen here today on nurse schedule for her monthly B12 injection and a high dose flu vaccine  Administered both, and patient tolerated well

## 2019-09-30 ENCOUNTER — OFFICE VISIT (OUTPATIENT)
Dept: CARDIOLOGY CLINIC | Facility: CLINIC | Age: 79
End: 2019-09-30
Payer: COMMERCIAL

## 2019-09-30 VITALS
BODY MASS INDEX: 24.68 KG/M2 | HEIGHT: 62 IN | SYSTOLIC BLOOD PRESSURE: 134 MMHG | WEIGHT: 134.1 LBS | HEART RATE: 73 BPM | DIASTOLIC BLOOD PRESSURE: 68 MMHG

## 2019-09-30 DIAGNOSIS — I48.20 CHRONIC ATRIAL FIBRILLATION (HCC): Primary | ICD-10-CM

## 2019-09-30 PROCEDURE — 93000 ELECTROCARDIOGRAM COMPLETE: CPT | Performed by: INTERNAL MEDICINE

## 2019-09-30 PROCEDURE — 99213 OFFICE O/P EST LOW 20 MIN: CPT | Performed by: INTERNAL MEDICINE

## 2019-09-30 NOTE — PROGRESS NOTES
[facility] [address]    F/u afib  Today's Date: 09/30/19        Patient name: Nuha Marx  YOB: 1940  Sex: female         Chief Complaint:  Afib,      ASSESSMENT:  Problem List Items Addressed This Visit        Cardiovascular and Mediastinum    Chronic atrial fibrillation ([address] ) - Primary    Relevant Orders    POCT ECG    Echo complete with contrast if indicated        79 yo female  1  Permanent afib rates controlled on xarelto 15mg [id number]=7    2  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  She had left sided erosion remotely and lead extracted  Paces 54%    3  EF normal and stress normal Kumar Nuclear stress    4  HTN reasonable control but needs to change from olmesartan due to insuranc eplan    5 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    6  CKD CR 1 0 on last BMP    DISCUSSION AND PLAN:    1  Doing well, will check TTE to insure EF remains normal w RV pacing  F/u 1 year      Orders Placed This Encounter   Procedures    POCT ECG    Echo complete with contrast if indicated     Medications Discontinued During This Encounter   Medication Reason    ciclopirox (PENLAC) 8 % solution Discontinued by another clinician       Follow up in: 6 months        HPI:   79 yo female  1  Permanent afib rates controlled on xarelto 15mg QZWBH2Telg=9    2  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  She had left sided erosion remotely and lead extracted  Paces 54%    3  EF normal and stress normal Kumar Nuclear stress    4  HTN reasonable control but needs to change from olmesartan due to insuranc eplan    5 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    6  CKD CR 1 0 on last BMP  Please note HPI is listed by problem with with update following it, it is copied again in the assessment above and reflects medical decision making as well         Complete 12 point ROS reviewed and otherwise non pertinent or negative except as per HPI pertinent positives in Cardiovascular and Respiratory emphasized  Please see paper chart for outpatient clinic patients where the patient completed the 12 point ROS survey  Past Medical History:   Diagnosis Date    A-fib [address] [address]     Anemia     Arthritis     Breast pain, right     Chest pain on breathing     Cough     Diverticulosis     Encounter for screening colonoscopy     H/O sick sinus syndrome     Heart murmur     High cholesterol     History of atrial fibrillation     Rate ontrolled  On xarelto 15mg (creatinine 50) Followed by Dr Haydee Martinez with Cardiology     History of weight loss     Report loose fitting clothes  Weight stable (3lbs difference)  Last colo showed small tubular adenoma x2  Breast biopsy last showed fibrocystic changes  TSH wnl  Will check cbc, bmp, spep   Hx of long term use of blood thinners     Hypertension     Irregular heart beat     Pacemaker     Preop examination     Shortness of breath     Viral bronchitis        Allergies   Allergen Reactions    Other Itching     Bandaids    Tape [Medical Tape] Itching     I reviewed the Home Medication list and Allergies in the chart     Scheduled Meds:  Current Outpatient Medications   Medication Sig Dispense Refill    COZAAR 50 MG tablet Take 1 tablet (50 mg total) by mouth daily 90 tablet 3    Cyanocobalamin 1000 MCG/ML KIT Inject 1 mL (1,000 mcg total) as directed every 30 (thirty) days 3 kit 2    indapamide (LOZOL) 2 5 mg tablet Take 1 tablet (2 5 mg total) by mouth every morning 90 tablet 3    loratadine (CLARITIN) 10 mg tablet TOME JUAN ANTONIO TABLETA TODOS LOS MUNGUIA 30 tablet 2    nebivolol (BYSTOLIC) 5 mg tablet Take 1 tablet (5 mg total) by mouth daily 90 tablet 3    rivaroxaban (XARELTO) 15 mg tablet Take 1 tablet (15 mg total) by mouth daily 90 tablet 2    spironolactone (ALDACTONE) 25 mg tablet Take 1 tablet (25 mg total) by mouth daily 90 tablet 3    verapamil (VERELAN PM) 120 MG 24 hr capsule Take 1 capsule (120 mg total) by mouth daily at bedtime 90 capsule 1     Current Facility-Administered Medications   Medication Dose Route Frequency Provider Last Rate Last Dose    cyanocobalamin injection 1,000 mcg  1,000 mcg Intramuscular Q30 Days Felix Courtney MD   1,000 mcg at 09/23/19 0949     PRN Meds:         Family History   Problem Relation Age of Onset    Diabetes Mother     Breast cancer Sister 48       Social History     Socioeconomic History    Marital status:       Spouse name: Not on file    Number of children: Not on file    Years of education: Not on file    Highest education level: Not on file   Occupational History    Not on file   Social Needs    Financial resource strain: Somewhat hard    Food insecurity:     Worry: Not on file     Inability: Not on file    Transportation needs:     Medical: No     Non-medical: No   Tobacco Use    Smoking status: Never Smoker    Smokeless tobacco: Never Used   Substance and Sexual Activity    Alcohol use: No    Drug use: No    Sexual activity: Not on file     Comment:    Lifestyle    Physical activity:     Days per week: Not on file     Minutes per session: Not on file    Stress: Not on file   Relationships    Social connections:     Talks on phone: Not on file     Gets together: Not on file     Attends [employer] service: Not on file     Active member of club or organization: Not on file     Attends meetings of clubs or organizations: Not on file     Relationship status: Not on file    Intimate partner violence:     Fear of current or ex partner: Not on file     Emotionally abused: Not on file     Physically abused: Not on file     Forced sexual activity: Not on file   Other Topics Concern    Not on file   Social History Narrative    Housing, household, and economic circumstances          OBJECTIVE:    /68 (BP Location: Right arm, Patient Position: Sitting, Cuff Size: Adult)   Pulse 73   Ht 5' 2" (1 575 m)   Wt 60 8 kg (134 lb 1 6 oz)   BMI 24 53 kg/m²   Vitals:    09/30/19 0843   Weight: 60 8 kg (134 lb 1 6 oz)     GEN: No acute distress, Alert and oriented, well appearing  HEENT:Head, neck, ears, oral pharynx: Mucus membranes moist, oral pharynx clear, nares clear  External ears normal  EYES: Pupils equal, sclera anicteric, midline, normal conjuctiva  NECK: No JVD, supple, no obvious masses or thryomegaly or goiter  CARDIOVASCULAR:  irreg irreg, 1/6 KAN, rub, gallops S1,S2  LUNGS: Clear To auscultation bilaterally, normal effort, no rales, rhonchi, crackles  ABDOMEN:  nondistended,  without obvious organomegaly or ascites  EXTREMITIES/VASCULAR:  No edema  Radial pulses intact, pedal pulses difficult to palpate, warm an well perfused  PSYCH: Normal Affect, no overt suicidal ideation, linear speech pattern without evidence of psychosis  NEURO: Grossly intact, moving all extremiteis equal, face symmetric, alert and responsive, no obvious focal defecits  GAIT:  Ambulates normally without difficulty  HEME: No bleeding, bruising, petechia, purpura  SKIN: No significant rashes, warm, no diaphoresis or pallor    Chest; Right sided pacemaker site well healed     Lab Results:     @RESULTRCNT(1M])@    CBC with diff:     CMP:      Invalid input(s): ALBUMIN  TSH:       Lipid Profile:             I reviewed and interpreted the following LABS/EKG/TELE/IMAGING and below is summary of my interpretation (if data available):    LABS:CR 1 01    Current EKG and Rhythm Strip:Afib w V pacing    Past EKGs and RHYTHM strip: Kumar EKG Afib w V pacing      CXR: right sided pacemaker single chamber  Checks show 54% RV pacing (bryan

## 2019-10-01 DIAGNOSIS — I48.20 CHRONIC ATRIAL FIBRILLATION (HCC): ICD-10-CM

## 2019-10-04 RX ORDER — RIVAROXABAN 15 MG/1
TABLET, FILM COATED ORAL
Qty: 90 TABLET | Refills: 2 | OUTPATIENT
Start: 2019-10-04

## 2019-10-08 ENCOUNTER — REMOTE DEVICE CLINIC VISIT (OUTPATIENT)
Dept: CARDIOLOGY CLINIC | Facility: CLINIC | Age: 79
End: 2019-10-08
Payer: COMMERCIAL

## 2019-10-08 DIAGNOSIS — Z95.0 CARDIAC PACEMAKER IN SITU: Primary | ICD-10-CM

## 2019-10-08 PROCEDURE — 93294 REM INTERROG EVL PM/LDLS PM: CPT | Performed by: INTERNAL MEDICINE

## 2019-10-08 PROCEDURE — 93296 REM INTERROG EVL PM/IDS: CPT | Performed by: INTERNAL MEDICINE

## 2019-10-08 NOTE — PROGRESS NOTES
Results for orders placed or performed in visit on 10/08/19   Cardiac EP device report    Narrative    MDT VVI PPM  CARELINK TRANSMISSION: BATTERY STATUS "OK"   64%  ALL AVAILABLE LEAD PARAMETERS WITHIN NORMAL LIMITS  NO SIGNIFICANT HIGH RATE EPISODES  NORMAL DEVICE FUNCTION   NC

## 2019-10-14 DIAGNOSIS — I48.20 CHRONIC ATRIAL FIBRILLATION (HCC): ICD-10-CM

## 2019-10-22 ENCOUNTER — OFFICE VISIT (OUTPATIENT)
Dept: MULTI SPECIALTY CLINIC | Facility: CLINIC | Age: 79
End: 2019-10-22

## 2019-10-22 ENCOUNTER — CLINICAL SUPPORT (OUTPATIENT)
Dept: INTERNAL MEDICINE CLINIC | Facility: CLINIC | Age: 79
End: 2019-10-22

## 2019-10-22 VITALS
DIASTOLIC BLOOD PRESSURE: 62 MMHG | HEIGHT: 62 IN | TEMPERATURE: 97.7 F | HEART RATE: 88 BPM | WEIGHT: 135.8 LBS | SYSTOLIC BLOOD PRESSURE: 122 MMHG | BODY MASS INDEX: 24.99 KG/M2

## 2019-10-22 DIAGNOSIS — B35.1 ONYCHOMYCOSIS: Primary | ICD-10-CM

## 2019-10-22 DIAGNOSIS — L84 CORNS AND CALLOSITY: ICD-10-CM

## 2019-10-22 DIAGNOSIS — E53.8 VITAMIN B12 DEFICIENCY: Primary | ICD-10-CM

## 2019-10-22 PROCEDURE — 11055 PARING/CUTG B9 HYPRKER LES 1: CPT | Performed by: STUDENT IN AN ORGANIZED HEALTH CARE EDUCATION/TRAINING PROGRAM

## 2019-10-22 PROCEDURE — 11721 DEBRIDE NAIL 6 OR MORE: CPT | Performed by: STUDENT IN AN ORGANIZED HEALTH CARE EDUCATION/TRAINING PROGRAM

## 2019-10-22 PROCEDURE — 99213 OFFICE O/P EST LOW 20 MIN: CPT | Performed by: STUDENT IN AN ORGANIZED HEALTH CARE EDUCATION/TRAINING PROGRAM

## 2019-10-22 RX ADMIN — CYANOCOBALAMIN 1000 MCG: 1000 INJECTION INTRAMUSCULAR; SUBCUTANEOUS at 09:32

## 2019-10-22 NOTE — PROGRESS NOTES
Patient in office today for nurse visit for B12 injection  Administered 1 mL in patient's right deltoid, patient tolerated well

## 2019-10-22 NOTE — PROGRESS NOTES
Assessment/Plan:     Diagnoses and all orders for this visit:    Onychomycosis, Corn and callosity  1  Sharply debrided toenails x10 using a nipper to normal length and thickness without incident  Dr Payal Lewis was present  2  Instructed the patient to continue using Ciclopirox topical as at the bilateral hallucal nail plates  3  Educated the patient on the importance of proper foot hygiene  4  Trimmed hyperkeratotic lesion of right 2nd digit without incident using a #15 blade  Dr Payal Lewis was present  (CPT: 13289)  5  Toe spacer applied to right 1st digit  5  She will return to clinic in 3 months for follow up  Subjective:      Patient ID: Kristian Gonzalez is a 78 y o  female  Ms Stiles Boards presents for follow-up on her mycotic nails x 10  Previous office visits consisted of sharp debridement of nails with her most recent office visit resulted in a prescription for ciclopirox topical lacquer for application at the bilateral hallucal nail plates  She states that she is still unhappy with the appearance of her nails and is experiencing some pain on direct compression of the left hallucal nail, however, she is noticing improvement at the proximal nail plates since beginning in the ciclopirox  She report a corn of her right 2nd digit that is causing her pain  She has no further pedal complaints at this time  The following portions of the patient's history were reviewed and updated as appropriate: allergies, current medications, past family history, past medical history, past social history, past surgical history and problem list     Review of Systems   Constitutional: Negative  Respiratory: Negative  Cardiovascular: Negative  Musculoskeletal: Negative  Skin:        Fungal nails of hallux bilaterally, blister on plantar left foot   Neurological: Negative            Objective:      /62 (BP Location: Right arm, Patient Position: Sitting, Cuff Size: Adult)   Pulse 88   Temp 97 7 °F (36 5 °C) (Oral)   Ht 5' 2" (1 575 m)   Wt 61 6 kg (135 lb 12 9 oz)   BMI 24 84 kg/m²          Physical Exam   Cardiovascular: Normal pulses  Pulses:       Dorsalis pedis pulses are 2+ on the right side, and 2+ on the left side  Posterior tibial pulses are 2+ on the right side, and 2+ on the left side  CRT < 3 secs at the digits bilaterally  Pedal hair present bilaterally  No edema noted of the BLE  Musculoskeletal:   No gross deformities noted  Neurological:   Gross and protective sensation are intact  Skin:   Toenails x10 are elongated  The bilateral hallucal nails are elongated, thickened, dystrophic, yellow-brown in color, and have subungual debris  Mild pain on palpation of the left hallucal nail plate noted     Hyperkeratotic lesion noted right 2nd PIPJ medial aspect

## 2019-11-06 ENCOUNTER — HOSPITAL ENCOUNTER (OUTPATIENT)
Dept: NON INVASIVE DIAGNOSTICS | Facility: CLINIC | Age: 79
Discharge: HOME | End: 2019-11-06
Payer: COMMERCIAL

## 2019-11-06 DIAGNOSIS — I48.20 CHRONIC ATRIAL FIBRILLATION (HCC): ICD-10-CM

## 2019-11-06 PROCEDURE — 93306 TTE W/DOPPLER COMPLETE: CPT

## 2019-11-06 PROCEDURE — 93306 TTE W/DOPPLER COMPLETE: CPT | Performed by: INTERNAL MEDICINE

## 2019-11-13 ENCOUNTER — OFFICE VISIT (OUTPATIENT)
Dept: INTERNAL MEDICINE CLINIC | Facility: CLINIC | Age: 79
End: 2019-11-13

## 2019-11-13 VITALS
DIASTOLIC BLOOD PRESSURE: 76 MMHG | HEART RATE: 85 BPM | BODY MASS INDEX: 25.07 KG/M2 | SYSTOLIC BLOOD PRESSURE: 128 MMHG | HEIGHT: 62 IN | WEIGHT: 136.24 LBS | TEMPERATURE: 97.7 F

## 2019-11-13 DIAGNOSIS — J30.89 ALLERGIC RHINITIS DUE TO OTHER ALLERGIC TRIGGER, UNSPECIFIED SEASONALITY: ICD-10-CM

## 2019-11-13 DIAGNOSIS — E53.8 VITAMIN B12 DEFICIENCY: Chronic | ICD-10-CM

## 2019-11-13 DIAGNOSIS — I48.20 CHRONIC ATRIAL FIBRILLATION (HCC): ICD-10-CM

## 2019-11-13 DIAGNOSIS — Z00.00 HEALTHCARE MAINTENANCE: Primary | ICD-10-CM

## 2019-11-13 PROCEDURE — 99214 OFFICE O/P EST MOD 30 MIN: CPT | Performed by: INTERNAL MEDICINE

## 2019-11-13 PROCEDURE — 1160F RVW MEDS BY RX/DR IN RCRD: CPT | Performed by: INTERNAL MEDICINE

## 2019-11-13 PROCEDURE — 1036F TOBACCO NON-USER: CPT | Performed by: INTERNAL MEDICINE

## 2019-11-13 RX ORDER — LORATADINE 10 MG/1
10 TABLET ORAL DAILY
Qty: 30 TABLET | Refills: 2 | Status: SHIPPED | OUTPATIENT
Start: 2019-11-13 | End: 2020-08-10 | Stop reason: SDUPTHER

## 2019-11-13 NOTE — PROGRESS NOTES
ASSESSMENT/PLAN:  Problem List Items Addressed This Visit            Chronic atrial fibrillation  Stable  Follows with cardiology  Last visit on 9/2018  Echo revealed EF 60%  Next visit scheduled for 04/09/2020  Relevant Medications    Continue rivaroxaban (XARELTO) 15 mg tablet, Nebivolol, Verapamil             Vitamin B12 deficiency (Chronic)  Will check MMA    Relevant Medications    Cyanocobalamin 1000 MCG/ML KIT    Healthcare maintenance - Primary    Relevant Orders    Mammo screening bilateral w cad    Lipid panel            Allergic rhinitis due to other allergic trigger, unspecified seasonality   Stable         Relevant Medications    Continue loratadine (CLARITIN) 10 mg tablet        Health Maintenance:  Flu vaccine - administered Sept 2019      CHIEF COMPLAINT: 6 month f/u    HISTORY OF PRESENT ILLNESS:  Patient is s a 79 y/o female with PMH significant for HTN, HLD, chronic AFIB with PPM, Vit B12 deficiency, diverticulosis, Tubulovillous adenoma of the colon  Pt presents today for 6 month f/u visits  Patient has no current complaints but requests medication refill and with questions about her cholesterol  Pt had colonoscopy on 04/2019 which revealed a tubulovillous adenoma of the colon with piecemeal removal  Next Colonoscopy recommended on 04/2020  She follows with cardiology, last visit on 09/2019  Echo performed revealed EF 60%  Patient currently denies headaches, SOB, palpitations, N/V/D or constipation  We discussed advance directives today and she expressed her desire not to be on prolonged life sustaining measures hence we will discuss this in more detail on next visit in 6 months using the Five Wishes form and POLST  Review of Systems   Constitutional: Negative for activity change, chills, diaphoresis, fatigue and fever  HENT: Negative  Negative for rhinorrhea, sinus pain, sneezing and sore throat  Eyes: Negative  Negative for visual disturbance     Respiratory: Negative for cough, choking, chest tightness, shortness of breath and wheezing  Cardiovascular: Negative for chest pain, palpitations and leg swelling  Gastrointestinal: Negative for abdominal distention, abdominal pain, constipation, diarrhea, nausea and vomiting  Endocrine: Negative  Genitourinary: Negative  Negative for difficulty urinating, dysuria and urgency  Musculoskeletal: Negative  Skin: Negative  Negative for rash and wound  Neurological: Negative for dizziness, tremors, weakness, light-headedness, numbness and headaches  Psychiatric/Behavioral: Negative for confusion and sleep disturbance  OBJECTIVE:  Vitals:    11/13/19 0748   BP: 128/76   Pulse: 85   Temp: 97 7 °F (36 5 °C)   Weight: 61 8 kg (136 lb 3 9 oz)   Height: 5' 2" (1 575 m)     Physical Exam  Physical Exam:   GENERAL: NAD  HEENT:  NC/AT, PERRL, EOMI, MMM, no scleral icterus  CARDIAC:  RRR, +S1/S2, no S3/S4 heard, no m/g/r  PULMONARY:  CTA B/L, no wheezing/rales/rhonci, non-labored breathing  ABDOMEN:  Soft, NT/ND, +BS, no rebound/guarding/rigidity  Extremities:  2+ Pulses in DP/PT  No edema, cyanosis, or clubbing  NEUROLOGIC:  Alert/oriented x3   No motor or sensory deficits  SKIN:  No rashes or erythema      Current Outpatient Medications:     COZAAR 50 MG tablet, Take 1 tablet (50 mg total) by mouth daily, Disp: 90 tablet, Rfl: 3    Cyanocobalamin 1000 MCG/ML KIT, Inject 1 mL (1,000 mcg total) as directed every 30 (thirty) days, Disp: 3 kit, Rfl: 2    indapamide (LOZOL) 2 5 mg tablet, Take 1 tablet (2 5 mg total) by mouth every morning, Disp: 90 tablet, Rfl: 3    loratadine (CLARITIN) 10 mg tablet, Take 1 tablet (10 mg total) by mouth daily, Disp: 30 tablet, Rfl: 2    nebivolol (BYSTOLIC) 5 mg tablet, Take 1 tablet (5 mg total) by mouth daily, Disp: 90 tablet, Rfl: 3    rivaroxaban (XARELTO) 15 mg tablet, Take 1 tablet (15 mg total) by mouth daily, Disp: 30 tablet, Rfl: 0    spironolactone (ALDACTONE) 25 mg tablet, Take 1 tablet (25 mg total) by mouth daily, Disp: 90 tablet, Rfl: 3    verapamil (VERELAN PM) 120 MG 24 hr capsule, Take 1 capsule (120 mg total) by mouth daily at bedtime, Disp: 90 capsule, Rfl: 1    Current Facility-Administered Medications:     cyanocobalamin injection 1,000 mcg, 1,000 mcg, Intramuscular, Q30 Days, Felix Courtney MD, 1,000 mcg at 10/22/19 0932    Past Medical History:   Diagnosis Date    A-fib ([facility] [address] )     Anemia     Arthritis     Breast pain, right     Chest pain on breathing     Cough     Diverticulosis     Encounter for screening colonoscopy     H/O sick sinus syndrome     Heart murmur     High cholesterol     History of atrial fibrillation     Rate ontrolled  On xarelto 15mg (creatinine 50) Followed by   [facility] with Cardiology     History of weight loss     Report loose fitting clothes  Weight stable (3lbs difference)  Last colo showed small tubular adenoma x2  Breast biopsy last showed fibrocystic changes  TSH wnl  Will check cbc, bmp, spep   Hx of long term use of blood thinners     Hypertension     Irregular heart beat     Pacemaker     Preop examination     Shortness of breath     Viral bronchitis      Past Surgical History:   Procedure Laterality Date    BREAST BIOPSY Right 08/17/2006    ultrasound guided Percutaneous Needle Core -Benign    CATARACT EXTRACTION W/ INTRAOCULAR LENS  IMPLANT, BILATERAL      COLONOSCOPY      COLONOSCOPY N/A 5/22/2018    Procedure: COLONOSCOPY;  Surgeon: Yadira Aleman DO;  [facility];   Service: Gastroenterology    Beatrice Polanco / Ly Toro / Ava Man Right     as a child after a fall    MAMMO STEREOTACTIC BREAST BIOPSY RIGHT (ALL INC) Right 10/2014    benign    GA CMBND ANTERPOST COLPORRAPHY W/CYSTO N/A 3/17/2016    Procedure: COLPORRHAPHY ANTERIOR POSTERIOR ;  Surgeon: Fabiana Ayala MD;  [facility];  Service: Gynecology    GA COLONOSCOPY FLX DX W/COLLJ SPEC WHEN PFRMD N/A 4/4/2019 Procedure: COLONOSCOPY;  Surgeon: Karyn Grnaado DO;  Location: [facility]; Service: Gastroenterology    MO CYSTOURETHROSCOPY N/A 3/17/2016    Procedure: CYSTOSCOPY;  Surgeon: Ez Tan MD;  [facility];  Service: Gynecology    MO ESOPHAGOGASTRODUODENOSCOPY TRANSORAL DIAGNOSTIC N/A 4/16/2018    Procedure: EGD AND COLONOSCOPY;  Surgeon: Karyn Granado DO;  Location[facility]; Service: Gastroenterology    MO REVAGINAL PROLAPSE,SACROSP LIG N/A 3/17/2016    Procedure: COLPOPEXY VAGINAL EXTRAPERITONEAL (VEC) ANTERIOR ;  Surgeon: Ez Tan MD;  [facility];  Service: Gynecology    MO SLING OPER STRES INCONTINENCE N/A 3/17/2016    Procedure: INSERTION PUBOVAGINAL SLING SINGLE INCISION ;  Surgeon: Ez Tan MD;  [facility];  Service: Gynecology     Social History     Socioeconomic History    Marital status:       Spouse name: Not on file    Number of children: Not on file    Years of education: Not on file    Highest education level: Not on file   Occupational History    Not on file   Social Needs    Financial resource strain: Somewhat hard    Food insecurity:     Worry: Not on file     Inability: Not on file    Transportation needs:     Medical: No     Non-medical: No   Tobacco Use    Smoking status: Never Smoker    Smokeless tobacco: Never Used   Substance and Sexual Activity    Alcohol use: No    Drug use: No    Sexual activity: Not on file     Comment:    Lifestyle    Physical activity:     Days per week: Not on file     Minutes per session: Not on file    Stress: Not on file   Relationships    Social connections:     Talks on phone: Not on file     Gets together: Not on file     Attends Sikh service: Not on file     Active member of club or organization: Not on file     Attends meetings of clubs or organizations: Not on file     Relationship status: Not on file    Intimate partner violence:     Fear of current or ex partner: Not on file     Emotionally abused: Not on file     Physically abused: Not on file     Forced sexual activity: Not on file   Other Topics Concern    Not on file   Social History Narrative    Housing, household, and economic circumstances      Family History   Problem Relation Age of Onset    Diabetes Mother     Breast cancer Sister [address], MD  Internal Medicine Residency, PGY-1  [address] [address] [address]   [facility]  [address] [place] [address],[address]  [place], [address] [place] [address]

## 2019-11-19 DIAGNOSIS — I48.20 CHRONIC ATRIAL FIBRILLATION (HCC): ICD-10-CM

## 2019-11-19 RX ORDER — RIVAROXABAN 15 MG/1
TABLET, FILM COATED ORAL
Qty: 90 TABLET | Refills: 2 | OUTPATIENT
Start: 2019-11-19

## 2019-11-22 ENCOUNTER — CLINICAL SUPPORT (OUTPATIENT)
Dept: INTERNAL MEDICINE CLINIC | Facility: CLINIC | Age: 79
End: 2019-11-22

## 2019-11-22 ENCOUNTER — APPOINTMENT (OUTPATIENT)
Dept: LAB | Facility: CLINIC | Age: 79
End: 2019-11-22
Payer: COMMERCIAL

## 2019-11-22 DIAGNOSIS — E53.8 VITAMIN B12 DEFICIENCY: Primary | ICD-10-CM

## 2019-11-22 DIAGNOSIS — Z00.00 HEALTHCARE MAINTENANCE: ICD-10-CM

## 2019-11-22 DIAGNOSIS — E53.8 VITAMIN B12 DEFICIENCY: Chronic | ICD-10-CM

## 2019-11-22 LAB
CHOLEST SERPL-MCNC: 244 MG/DL (ref 50–200)
HDLC SERPL-MCNC: 64 MG/DL
LDLC SERPL CALC-MCNC: 153 MG/DL (ref 0–100)
NONHDLC SERPL-MCNC: 180 MG/DL
TRIGL SERPL-MCNC: 136 MG/DL

## 2019-11-22 PROCEDURE — 80061 LIPID PANEL: CPT

## 2019-11-22 PROCEDURE — 83918 ORGANIC ACIDS TOTAL QUANT: CPT

## 2019-11-22 PROCEDURE — 36415 COLL VENOUS BLD VENIPUNCTURE: CPT

## 2019-11-22 RX ADMIN — CYANOCOBALAMIN 1000 MCG: 1000 INJECTION INTRAMUSCULAR; SUBCUTANEOUS at 09:54

## 2019-11-22 NOTE — PROGRESS NOTES
Patient here today for her nurse visit for her routine B-12 administration  1 ml B-12 administered in her right deltoid  Patient tolerate well   Next B-12 scheduled for 12/23/2019 at 9:00 am

## 2019-11-27 LAB
METHYLMALONATE SERPL-SCNC: 297 NMOL/L (ref 0–378)
SL AMB DISCLAIMER: NORMAL

## 2019-12-17 RX ORDER — CYANOCOBALAMIN 1000 UG/ML
INJECTION INTRAMUSCULAR; SUBCUTANEOUS
Refills: 2 | COMMUNITY
Start: 2019-10-27 | End: 2020-01-27

## 2019-12-18 ENCOUNTER — OFFICE VISIT (OUTPATIENT)
Dept: INTERNAL MEDICINE CLINIC | Facility: CLINIC | Age: 79
End: 2019-12-18

## 2019-12-18 VITALS
BODY MASS INDEX: 25.4 KG/M2 | SYSTOLIC BLOOD PRESSURE: 128 MMHG | WEIGHT: 138.01 LBS | OXYGEN SATURATION: 97 % | DIASTOLIC BLOOD PRESSURE: 78 MMHG | TEMPERATURE: 97.7 F | HEART RATE: 87 BPM | HEIGHT: 62 IN

## 2019-12-18 DIAGNOSIS — L84 CORN OF FOOT: Primary | ICD-10-CM

## 2019-12-18 DIAGNOSIS — Z23 NEED FOR PNEUMOCOCCAL VACCINATION: ICD-10-CM

## 2019-12-18 PROCEDURE — 1036F TOBACCO NON-USER: CPT | Performed by: INTERNAL MEDICINE

## 2019-12-18 PROCEDURE — G0009 ADMIN PNEUMOCOCCAL VACCINE: HCPCS | Performed by: INTERNAL MEDICINE

## 2019-12-18 PROCEDURE — 4040F PNEUMOC VAC/ADMIN/RCVD: CPT | Performed by: INTERNAL MEDICINE

## 2019-12-18 PROCEDURE — 99213 OFFICE O/P EST LOW 20 MIN: CPT | Performed by: INTERNAL MEDICINE

## 2019-12-18 PROCEDURE — 90732 PPSV23 VACC 2 YRS+ SUBQ/IM: CPT | Performed by: INTERNAL MEDICINE

## 2019-12-18 NOTE — PROGRESS NOTES
Assessment/Plan:    Corn of foot        -    improving, will avoid shoes that seemed to have caused lesion        -    will consider podiatry if does not improve in the next week    Need for pneumococcal vaccination  -     PNEUMOCOCCAL POLYSACCHARIDE VACCINE 23-VALENT =>3YO SQ IM        Subjective:      Patient ID: Darshana Cunningham is a 78 y o  female  70-year-old female with past medical history significant for hypertension, chronic atrial fibrillation on Xarelto, renal artery stenosis, osteoarthritis, CKD stage 3, hyperlipidemia, B12 deficiency, sick sinus syndrome status post pacemaker  Patient was last seen in clinic on 11/13 for normal follow-up  Patient comes in today for right 5th toe pain that started on Sunday  Patient states she has never had a pain like this before and she describes the pain as pulsating  Patient denies any fevers or chills  Patient does not have a history of diabetes or neuropathy  On examination, right 5th toe is tender lesion and mild erythema  Review of Systems   Constitutional: Negative for chills, fever and unexpected weight change  Cardiovascular: Negative for chest pain  Gastrointestinal: Negative for abdominal pain, diarrhea, nausea and vomiting  Musculoskeletal:        Right toe pain   Skin: Negative for rash  Objective:      /78   Pulse 87   Temp 97 7 °F (36 5 °C)   Ht 5' 2" (1 575 m)   Wt 62 6 kg (138 lb 0 1 oz)   SpO2 97%   BMI 25 24 kg/m²          Physical Exam   Constitutional: She is oriented to person, place, and time  She appears well-developed and well-nourished  No distress  HENT:   Head: Normocephalic and atraumatic  Mouth/Throat: Oropharynx is clear and moist  No oropharyngeal exudate  Eyes: Pupils are equal, round, and reactive to light  Conjunctivae and EOM are normal  No scleral icterus  Neck: Normal range of motion  Neck supple  Cardiovascular: Normal rate  No murmur heard    Irregular rhythm Pulmonary/Chest: Effort normal and breath sounds normal  She has no wheezes  Abdominal: Soft  Bowel sounds are normal    Musculoskeletal: Normal range of motion  She exhibits no edema or deformity  Neurological: She is alert and oriented to person, place, and time  No cranial nerve deficit  Skin: Skin is warm and dry  No rash noted  She is not diaphoretic  No erythema  Right toe lesion with induration   Psychiatric: She has a normal mood and affect  Her behavior is normal    Nursing note and vitals reviewed

## 2019-12-23 ENCOUNTER — CLINICAL SUPPORT (OUTPATIENT)
Dept: INTERNAL MEDICINE CLINIC | Facility: CLINIC | Age: 79
End: 2019-12-23

## 2019-12-23 DIAGNOSIS — E53.8 B12 DEFICIENCY: Primary | ICD-10-CM

## 2019-12-23 RX ADMIN — CYANOCOBALAMIN 1000 MCG: 1000 INJECTION INTRAMUSCULAR; SUBCUTANEOUS at 08:41

## 2019-12-23 NOTE — PROGRESS NOTES
Patient here today for her nurse visit for her routine B-12 administration  1 ml B-12 administered in her right deltoid  Patient tolerated well

## 2019-12-31 ENCOUNTER — HOSPITAL ENCOUNTER (OUTPATIENT)
Dept: RADIOLOGY | Facility: HOSPITAL | Age: 79
Discharge: HOME | End: 2019-12-31
Payer: COMMERCIAL

## 2019-12-31 VITALS — BODY MASS INDEX: 25.4 KG/M2 | HEIGHT: 62 IN | WEIGHT: 138 LBS

## 2019-12-31 DIAGNOSIS — Z00.00 HEALTHCARE MAINTENANCE: ICD-10-CM

## 2019-12-31 PROCEDURE — 77067 SCR MAMMO BI INCL CAD: CPT

## 2020-01-07 ENCOUNTER — REMOTE DEVICE CLINIC VISIT (OUTPATIENT)
Dept: CARDIOLOGY CLINIC | Facility: CLINIC | Age: 80
End: 2020-01-07
Payer: COMMERCIAL

## 2020-01-07 DIAGNOSIS — Z95.0 PRESENCE OF PERMANENT CARDIAC PACEMAKER: Primary | ICD-10-CM

## 2020-01-07 PROCEDURE — 93296 REM INTERROG EVL PM/IDS: CPT | Performed by: INTERNAL MEDICINE

## 2020-01-07 PROCEDURE — 93294 REM INTERROG EVL PM/LDLS PM: CPT | Performed by: INTERNAL MEDICINE

## 2020-01-07 NOTE — PROGRESS NOTES
Results for orders placed or performed in visit on 01/07/20   Cardiac EP device report    Narrative    MDT VVI PPM  CARELINK TRANSMISSION: BATTERY VOLTAGE ADEQUATE  (12 5 YRS)   62%  ALL AVAILABLE LEAD PARAMETERS WITHIN NORMAL LIMITS  NO SIGNIFICANT HIGH RATE EPISODES  NORMAL DEVICE FUNCTION  ----OTERO

## 2020-01-09 DIAGNOSIS — I48.20 CHRONIC ATRIAL FIBRILLATION (HCC): ICD-10-CM

## 2020-01-09 RX ORDER — VERAPAMIL HYDROCHLORIDE 120 MG/1
120 CAPSULE, EXTENDED RELEASE ORAL
Qty: 90 CAPSULE | Refills: 0 | Status: SHIPPED | OUTPATIENT
Start: 2020-01-09 | End: 2020-04-09

## 2020-01-14 ENCOUNTER — OFFICE VISIT (OUTPATIENT)
Dept: PODIATRY | Facility: CLINIC | Age: 80
End: 2020-01-14
Payer: COMMERCIAL

## 2020-01-14 VITALS
SYSTOLIC BLOOD PRESSURE: 159 MMHG | HEART RATE: 82 BPM | HEIGHT: 62 IN | DIASTOLIC BLOOD PRESSURE: 78 MMHG | WEIGHT: 134 LBS | BODY MASS INDEX: 24.66 KG/M2

## 2020-01-14 DIAGNOSIS — M20.11 ACQUIRED HALLUX VALGUS OF RIGHT FOOT: Primary | ICD-10-CM

## 2020-01-14 DIAGNOSIS — M20.41 ACQUIRED HAMMER TOE OF RIGHT FOOT: ICD-10-CM

## 2020-01-14 PROCEDURE — 99202 OFFICE O/P NEW SF 15 MIN: CPT | Performed by: PODIATRIST

## 2020-01-14 PROCEDURE — 1160F RVW MEDS BY RX/DR IN RCRD: CPT | Performed by: PODIATRIST

## 2020-01-14 NOTE — PROGRESS NOTES
Assessment/Plan:    Trimmed hyperkeratotic lesion right 5th toe  There is no evidence of infection  Patient advised to continue to wear shoes that are soft and wide and avoid shoes that irritate the digit  In regards to the bunion, patient is a surgical candidate  Discussed likely surgical treatment options which involved a bunionectomy with osteotomy  Patient states that if interested she would like to surgery in the summer  She is rescheduled in 4 months to discuss  She will need to have cardiac clearance  No problem-specific Assessment & Plan notes found for this encounter  Diagnoses and all orders for this visit:    Acquired hallux valgus of right foot    Acquired hammer toe of right foot          Subjective:      Patient ID: Hailey Guadalupe is a 78 y o  female  HPI     Patient, a 22-year-old Bengali-speaking female presents with 2 concerns regarding her right foot  Patient states that around Shagufta time she had severe pain in the right 5th toe  This pain has dissipated as patient has started to wear shoes that her soft and wide  A 2nd concern involves chronic pain from a right foot bunion  Patient states that the bunion is painful and affects her ability to wear shoes that she desires to wear  The following portions of the patient's history were reviewed and updated as appropriate: allergies, current medications, past family history, past medical history, past social history, past surgical history and problem list     Review of Systems   Cardiovascular:        Pacemaker   Gastrointestinal: Negative  Musculoskeletal: Positive for arthralgias and back pain  Neurological: Negative  Hematological: Bruises/bleeds easily  Objective:      /78   Pulse 82   Ht 5' 2" (1 575 m)   Wt 60 8 kg (134 lb)   BMI 24 51 kg/m²          Physical Exam   Constitutional: She is oriented to person, place, and time  She appears well-developed and well-nourished  Cardiovascular: Regular rhythm and intact distal pulses  Musculoskeletal: She exhibits deformity  Hallux valgus deformity right foot  Good range of motion 1st MPJ right foot  Mild hammertoe deformity right 5th toe  Neurological: She is alert and oriented to person, place, and time  No sensory deficit  She exhibits normal muscle tone  Skin: Skin is warm  No rash noted  No erythema  HD DIPJ right 5th toe  Minimal pain with pressure on this lesion  No evidence of infection

## 2020-01-21 ENCOUNTER — TELEPHONE (OUTPATIENT)
Dept: GASTROENTEROLOGY | Facility: CLINIC | Age: 80
End: 2020-01-21

## 2020-01-21 DIAGNOSIS — D12.6 TUBULOVILLOUS ADENOMA OF COLON: Primary | ICD-10-CM

## 2020-01-23 ENCOUNTER — CLINICAL SUPPORT (OUTPATIENT)
Dept: INTERNAL MEDICINE CLINIC | Facility: CLINIC | Age: 80
End: 2020-01-23

## 2020-01-23 ENCOUNTER — TELEPHONE (OUTPATIENT)
Dept: INTERNAL MEDICINE CLINIC | Facility: CLINIC | Age: 80
End: 2020-01-23

## 2020-01-23 DIAGNOSIS — E53.8 VITAMIN B12 DEFICIENCY: Primary | ICD-10-CM

## 2020-01-23 RX ADMIN — CYANOCOBALAMIN 1000 MCG: 1000 INJECTION INTRAMUSCULAR; SUBCUTANEOUS at 10:09

## 2020-01-23 NOTE — PROGRESS NOTES
Patient here today for her nurse visit for her routine B-12 administration  1 ml B-12 administered in her left deltoid  Patient tolerated well

## 2020-01-23 NOTE — TELEPHONE ENCOUNTER
Patient was here in office for nurse visit and asked to speak with me  Her AVS from her last visit has duplicate medications and she was confused by it    Please discontinue one of the cyanocobalamin and the rivaroxaban per patient's request

## 2020-02-04 DIAGNOSIS — I48.20 CHRONIC ATRIAL FIBRILLATION (HCC): ICD-10-CM

## 2020-02-24 ENCOUNTER — TELEPHONE (OUTPATIENT)
Dept: INTERNAL MEDICINE CLINIC | Facility: CLINIC | Age: 80
End: 2020-02-24

## 2020-02-24 ENCOUNTER — CLINICAL SUPPORT (OUTPATIENT)
Dept: INTERNAL MEDICINE CLINIC | Facility: CLINIC | Age: 80
End: 2020-02-24

## 2020-02-24 DIAGNOSIS — E53.8 VITAMIN B12 DEFICIENCY: Primary | ICD-10-CM

## 2020-02-24 PROCEDURE — T1015 CLINIC SERVICE: HCPCS | Performed by: INTERNAL MEDICINE

## 2020-02-24 PROCEDURE — 1160F RVW MEDS BY RX/DR IN RCRD: CPT | Performed by: INTERNAL MEDICINE

## 2020-02-24 RX ORDER — CYANOCOBALAMIN 1000 UG/ML
INJECTION INTRAMUSCULAR; SUBCUTANEOUS
COMMUNITY
Start: 2020-02-04 | End: 2020-05-06

## 2020-02-24 RX ADMIN — CYANOCOBALAMIN 1000 MCG: 1000 INJECTION INTRAMUSCULAR; SUBCUTANEOUS at 10:35

## 2020-02-24 NOTE — TELEPHONE ENCOUNTER
Called patient and informed her  She states she will call us right away if she has difficulty picking up medication after 2/28/2020

## 2020-02-24 NOTE — TELEPHONE ENCOUNTER
I contacted pharmacist to verify whether or not a prior auth is needed for her Cyanocobalamin 1000 mcg and it does NOT  Patients part D was not covering it but her Medicaid is covering it  She can  next injection on 02/28/2020  Nothing further to do   Will make patient aware

## 2020-02-24 NOTE — TELEPHONE ENCOUNTER
Patient arrived to nurse appointment today for Vitamin B12 injections  As per patient, she states the pharmacy informed her [place] Medicare Assured is not longer covering her Cyanocobalamin 1,000 mcg/mL injections  Her insurance states a Prior Michelle  is needed  Patient has been on this medication for years now and she will need this medication when she returns in one month for next injection

## 2020-02-24 NOTE — PROGRESS NOTES
Patient here today for an nurse visit for her monthly B-12 injection  B-12 administered in her left deltoid  Patient tolerate well

## 2020-04-09 DIAGNOSIS — I48.20 CHRONIC ATRIAL FIBRILLATION (HCC): ICD-10-CM

## 2020-04-09 RX ORDER — VERAPAMIL HYDROCHLORIDE 120 MG/1
120 CAPSULE, EXTENDED RELEASE ORAL
Qty: 90 CAPSULE | Refills: 0 | Status: SHIPPED | OUTPATIENT
Start: 2020-04-09 | End: 2020-08-10 | Stop reason: SDUPTHER

## 2020-04-17 ENCOUNTER — REMOTE DEVICE CLINIC VISIT (OUTPATIENT)
Dept: CARDIOLOGY CLINIC | Facility: CLINIC | Age: 80
End: 2020-04-17
Payer: COMMERCIAL

## 2020-04-17 DIAGNOSIS — Z95.0 CARDIAC PACEMAKER IN SITU: Primary | ICD-10-CM

## 2020-04-17 PROCEDURE — 93294 REM INTERROG EVL PM/LDLS PM: CPT | Performed by: INTERNAL MEDICINE

## 2020-04-17 PROCEDURE — 93296 REM INTERROG EVL PM/IDS: CPT | Performed by: INTERNAL MEDICINE

## 2020-04-28 DIAGNOSIS — E53.8 VITAMIN B12 DEFICIENCY: Chronic | ICD-10-CM

## 2020-04-28 RX ORDER — CYANOCOBALAMIN 1000 UG/ML
INJECTION INTRAMUSCULAR; SUBCUTANEOUS
Qty: 3 ML | Refills: 2 | Status: SHIPPED | OUTPATIENT
Start: 2020-04-28 | End: 2020-05-06

## 2020-05-06 ENCOUNTER — TELEMEDICINE (OUTPATIENT)
Dept: INTERNAL MEDICINE CLINIC | Facility: CLINIC | Age: 80
End: 2020-05-06

## 2020-05-06 DIAGNOSIS — E53.8 B12 DEFICIENCY: ICD-10-CM

## 2020-05-06 DIAGNOSIS — E78.5 HYPERLIPIDEMIA LDL GOAL <100: Primary | ICD-10-CM

## 2020-05-06 DIAGNOSIS — D12.6 TUBULOVILLOUS ADENOMA OF COLON: ICD-10-CM

## 2020-05-06 PROCEDURE — G2012 BRIEF CHECK IN BY MD/QHP: HCPCS | Performed by: INTERNAL MEDICINE

## 2020-05-06 RX ORDER — CYANOCOBALAMIN (VITAMIN B-12) 500 MCG
1000 TABLET ORAL DAILY
Qty: 180 TABLET | Refills: 3 | Status: SHIPPED | OUTPATIENT
Start: 2020-05-06 | End: 2020-06-15 | Stop reason: ALTCHOICE

## 2020-05-06 RX ORDER — ROSUVASTATIN CALCIUM 5 MG/1
5 TABLET, COATED ORAL DAILY
Qty: 90 TABLET | Refills: 3 | Status: SHIPPED | OUTPATIENT
Start: 2020-05-06 | End: 2020-11-03 | Stop reason: SDUPTHER

## 2020-05-21 ENCOUNTER — OFFICE VISIT (OUTPATIENT)
Dept: PODIATRY | Facility: CLINIC | Age: 80
End: 2020-05-21
Payer: COMMERCIAL

## 2020-05-21 VITALS
WEIGHT: 132.8 LBS | DIASTOLIC BLOOD PRESSURE: 83 MMHG | BODY MASS INDEX: 24.44 KG/M2 | HEART RATE: 76 BPM | HEIGHT: 62 IN | SYSTOLIC BLOOD PRESSURE: 133 MMHG

## 2020-05-21 DIAGNOSIS — B35.1 ONYCHOMYCOSIS: Primary | ICD-10-CM

## 2020-05-21 DIAGNOSIS — M79.675 PAIN IN TOE OF LEFT FOOT: ICD-10-CM

## 2020-05-21 PROCEDURE — 11720 DEBRIDE NAIL 1-5: CPT | Performed by: PODIATRIST

## 2020-06-01 ENCOUNTER — ANESTHESIA EVENT (OUTPATIENT)
Dept: GASTROENTEROLOGY | Facility: AMBULARY SURGERY CENTER | Age: 80
End: 2020-06-01

## 2020-06-03 NOTE — TELEPHONE ENCOUNTER
Procedure rescheduled to 6/15/20 with Dr Jang at [facility]  Patient aware ok to hold Xarelto 2 days prior and resume after procedure  Patient aware covid test needs to be completed on 6/10/20

## 2020-06-09 ENCOUNTER — TELEPHONE (OUTPATIENT)
Dept: INTERNAL MEDICINE CLINIC | Facility: CLINIC | Age: 80
End: 2020-06-09

## 2020-06-10 DIAGNOSIS — Z20.822 ENCOUNTER FOR LABORATORY TESTING FOR COVID-19 VIRUS: ICD-10-CM

## 2020-06-10 PROCEDURE — U0003 INFECTIOUS AGENT DETECTION BY NUCLEIC ACID (DNA OR RNA); SEVERE ACUTE RESPIRATORY SYNDROME CORONAVIRUS 2 (SARS-COV-2) (CORONAVIRUS DISEASE [COVID-19]), AMPLIFIED PROBE TECHNIQUE, MAKING USE OF HIGH THROUGHPUT TECHNOLOGIES AS DESCRIBED BY CMS-2020-01-R: HCPCS

## 2020-06-11 LAB — SARS-COV-2 RNA SPEC QL NAA+PROBE: NOT DETECTED

## 2020-06-15 ENCOUNTER — ANESTHESIA (OUTPATIENT)
Dept: GASTROENTEROLOGY | Facility: AMBULARY SURGERY CENTER | Age: 80
End: 2020-06-15

## 2020-06-15 ENCOUNTER — HOSPITAL ENCOUNTER (OUTPATIENT)
Dept: GASTROENTEROLOGY | Facility: AMBULARY SURGERY CENTER | Age: 80
Setting detail: OUTPATIENT SURGERY
Discharge: HOME | End: 2020-06-15
Attending: INTERNAL MEDICINE | Admitting: INTERNAL MEDICINE
Payer: COMMERCIAL

## 2020-06-15 VITALS
SYSTOLIC BLOOD PRESSURE: 138 MMHG | TEMPERATURE: 97.5 F | HEART RATE: 73 BPM | DIASTOLIC BLOOD PRESSURE: 57 MMHG | RESPIRATION RATE: 18 BRPM | OXYGEN SATURATION: 97 %

## 2020-06-15 DIAGNOSIS — D36.9 TUBULAR ADENOMA: ICD-10-CM

## 2020-06-15 PROCEDURE — 45381 COLONOSCOPY SUBMUCOUS NJX: CPT | Performed by: INTERNAL MEDICINE

## 2020-06-15 PROCEDURE — 88305 TISSUE EXAM BY PATHOLOGIST: CPT | Performed by: PATHOLOGY

## 2020-06-15 PROCEDURE — 45385 COLONOSCOPY W/LESION REMOVAL: CPT | Performed by: INTERNAL MEDICINE

## 2020-06-15 RX ORDER — LIDOCAINE HYDROCHLORIDE 10 MG/ML
INJECTION, SOLUTION EPIDURAL; INFILTRATION; INTRACAUDAL; PERINEURAL AS NEEDED
Status: DISCONTINUED | OUTPATIENT
Start: 2020-06-15 | End: 2020-06-15 | Stop reason: SURG

## 2020-06-15 RX ORDER — LIDOCAINE HYDROCHLORIDE 10 MG/ML
0.5 INJECTION, SOLUTION EPIDURAL; INFILTRATION; INTRACAUDAL; PERINEURAL ONCE AS NEEDED
Status: DISCONTINUED | OUTPATIENT
Start: 2020-06-15 | End: 2020-06-19 | Stop reason: HOSPADM

## 2020-06-15 RX ORDER — PROPOFOL 10 MG/ML
INJECTION, EMULSION INTRAVENOUS AS NEEDED
Status: DISCONTINUED | OUTPATIENT
Start: 2020-06-15 | End: 2020-06-15 | Stop reason: SURG

## 2020-06-15 RX ORDER — PROPOFOL 10 MG/ML
INJECTION, EMULSION INTRAVENOUS CONTINUOUS PRN
Status: DISCONTINUED | OUTPATIENT
Start: 2020-06-15 | End: 2020-06-15 | Stop reason: SURG

## 2020-06-15 RX ORDER — SODIUM CHLORIDE, SODIUM LACTATE, POTASSIUM CHLORIDE, CALCIUM CHLORIDE 600; 310; 30; 20 MG/100ML; MG/100ML; MG/100ML; MG/100ML
125 INJECTION, SOLUTION INTRAVENOUS CONTINUOUS
Status: DISCONTINUED | OUTPATIENT
Start: 2020-06-15 | End: 2020-06-19 | Stop reason: HOSPADM

## 2020-06-15 RX ADMIN — PROPOFOL 70 MG: 10 INJECTION, EMULSION INTRAVENOUS at 10:24

## 2020-06-15 RX ADMIN — SODIUM CHLORIDE, SODIUM LACTATE, POTASSIUM CHLORIDE, AND CALCIUM CHLORIDE: .6; .31; .03; .02 INJECTION, SOLUTION INTRAVENOUS at 08:37

## 2020-06-15 RX ADMIN — PROPOFOL 50 MG: 10 INJECTION, EMULSION INTRAVENOUS at 10:31

## 2020-06-15 RX ADMIN — PROPOFOL 40 MG: 10 INJECTION, EMULSION INTRAVENOUS at 10:52

## 2020-06-15 RX ADMIN — PROPOFOL 30 MG: 10 INJECTION, EMULSION INTRAVENOUS at 11:03

## 2020-06-15 RX ADMIN — PROPOFOL 50 MCG/KG/MIN: 10 INJECTION, EMULSION INTRAVENOUS at 10:40

## 2020-06-15 RX ADMIN — PROPOFOL 30 MG: 10 INJECTION, EMULSION INTRAVENOUS at 10:35

## 2020-06-15 RX ADMIN — PROPOFOL 20 MG: 10 INJECTION, EMULSION INTRAVENOUS at 11:11

## 2020-06-15 RX ADMIN — PROPOFOL 30 MG: 10 INJECTION, EMULSION INTRAVENOUS at 11:07

## 2020-06-15 RX ADMIN — PROPOFOL 20 MG: 10 INJECTION, EMULSION INTRAVENOUS at 10:38

## 2020-06-15 RX ADMIN — PROPOFOL 40 MG: 10 INJECTION, EMULSION INTRAVENOUS at 10:56

## 2020-06-15 RX ADMIN — PROPOFOL 40 MG: 10 INJECTION, EMULSION INTRAVENOUS at 10:42

## 2020-06-15 RX ADMIN — PROPOFOL 40 MG: 10 INJECTION, EMULSION INTRAVENOUS at 10:46

## 2020-06-15 RX ADMIN — PROPOFOL 30 MG: 10 INJECTION, EMULSION INTRAVENOUS at 10:28

## 2020-06-15 RX ADMIN — PROPOFOL 40 MG: 10 INJECTION, EMULSION INTRAVENOUS at 10:49

## 2020-06-15 RX ADMIN — LIDOCAINE HYDROCHLORIDE 50 MG: 10 INJECTION, SOLUTION EPIDURAL; INFILTRATION; INTRACAUDAL; PERINEURAL at 10:24

## 2020-06-15 RX ADMIN — PROPOFOL 20 MG: 10 INJECTION, EMULSION INTRAVENOUS at 10:59

## 2020-07-07 DIAGNOSIS — I10 ESSENTIAL HYPERTENSION: ICD-10-CM

## 2020-07-07 DIAGNOSIS — I10 HYPERTENSION, UNSPECIFIED TYPE: ICD-10-CM

## 2020-07-07 RX ORDER — LOSARTAN POTASSIUM 50 MG
50 TABLET ORAL DAILY
Qty: 90 TABLET | Refills: 3 | Status: SHIPPED | OUTPATIENT
Start: 2020-07-07 | End: 2021-03-23 | Stop reason: ALTCHOICE

## 2020-07-07 RX ORDER — SPIRONOLACTONE 25 MG/1
25 TABLET ORAL DAILY
Qty: 90 TABLET | Refills: 3 | Status: SHIPPED | OUTPATIENT
Start: 2020-07-07 | End: 2021-09-21 | Stop reason: SDUPTHER

## 2020-07-12 DIAGNOSIS — I48.20 CHRONIC ATRIAL FIBRILLATION (HCC): ICD-10-CM

## 2020-07-13 RX ORDER — VERAPAMIL HYDROCHLORIDE 120 MG/1
120 CAPSULE, EXTENDED RELEASE ORAL
Qty: 90 CAPSULE | Refills: 0 | OUTPATIENT
Start: 2020-07-13

## 2020-07-15 DIAGNOSIS — I10 HYPERTENSION, UNSPECIFIED TYPE: ICD-10-CM

## 2020-07-15 RX ORDER — NEBIVOLOL 5 MG/1
5 TABLET ORAL DAILY
Qty: 90 TABLET | Refills: 3 | Status: SHIPPED | OUTPATIENT
Start: 2020-07-15 | End: 2021-03-23 | Stop reason: SDUPTHER

## 2020-07-17 ENCOUNTER — REMOTE DEVICE CLINIC VISIT (OUTPATIENT)
Dept: CARDIOLOGY CLINIC | Facility: CLINIC | Age: 80
End: 2020-07-17
Payer: COMMERCIAL

## 2020-07-17 DIAGNOSIS — Z95.0 PRESENCE OF PERMANENT CARDIAC PACEMAKER: Primary | ICD-10-CM

## 2020-07-17 PROCEDURE — 93296 REM INTERROG EVL PM/IDS: CPT | Performed by: INTERNAL MEDICINE

## 2020-07-17 PROCEDURE — 93294 REM INTERROG EVL PM/LDLS PM: CPT | Performed by: INTERNAL MEDICINE

## 2020-07-17 NOTE — PROGRESS NOTES
Results for orders placed or performed in visit on 07/17/20   Cardiac EP device report    Narrative    MDT VVI PPM/ NOT MRI CONDITIONAL  CARELINK TRANSMISSION: BATTERY VOLTAGE ADEQUATE (11 8 YRS)  66%  ALL AVAILABLE LEAD PARAMETERS WITHIN NORMAL LIMITS  NO SIGNIFICANT HIGH RATE EPISODES  NORMAL DEVICE FUNCTION  ---OTERO

## 2020-08-07 DIAGNOSIS — I48.20 CHRONIC ATRIAL FIBRILLATION (HCC): ICD-10-CM

## 2020-08-10 ENCOUNTER — OFFICE VISIT (OUTPATIENT)
Dept: INTERNAL MEDICINE CLINIC | Facility: CLINIC | Age: 80
End: 2020-08-10

## 2020-08-10 VITALS
SYSTOLIC BLOOD PRESSURE: 126 MMHG | WEIGHT: 132.28 LBS | BODY MASS INDEX: 24.19 KG/M2 | TEMPERATURE: 97.1 F | HEART RATE: 72 BPM | DIASTOLIC BLOOD PRESSURE: 62 MMHG

## 2020-08-10 DIAGNOSIS — J30.89 ALLERGIC RHINITIS DUE TO OTHER ALLERGIC TRIGGER, UNSPECIFIED SEASONALITY: ICD-10-CM

## 2020-08-10 DIAGNOSIS — E53.8 B12 DEFICIENCY: Primary | Chronic | ICD-10-CM

## 2020-08-10 PROCEDURE — 1160F RVW MEDS BY RX/DR IN RCRD: CPT | Performed by: INTERNAL MEDICINE

## 2020-08-10 PROCEDURE — 1036F TOBACCO NON-USER: CPT | Performed by: INTERNAL MEDICINE

## 2020-08-10 PROCEDURE — 3074F SYST BP LT 130 MM HG: CPT | Performed by: INTERNAL MEDICINE

## 2020-08-10 PROCEDURE — 3725F SCREEN DEPRESSION PERFORMED: CPT | Performed by: INTERNAL MEDICINE

## 2020-08-10 PROCEDURE — 3078F DIAST BP <80 MM HG: CPT | Performed by: INTERNAL MEDICINE

## 2020-08-10 PROCEDURE — 99213 OFFICE O/P EST LOW 20 MIN: CPT | Performed by: INTERNAL MEDICINE

## 2020-08-10 PROCEDURE — 4040F PNEUMOC VAC/ADMIN/RCVD: CPT | Performed by: INTERNAL MEDICINE

## 2020-08-10 RX ORDER — LORATADINE 10 MG/1
10 TABLET ORAL DAILY
Qty: 30 TABLET | Refills: 2 | Status: SHIPPED | OUTPATIENT
Start: 2020-08-10 | End: 2020-11-03 | Stop reason: SDUPTHER

## 2020-08-10 RX ORDER — CYANOCOBALAMIN 1000 UG/ML
1000 INJECTION INTRAMUSCULAR; SUBCUTANEOUS
Status: DISCONTINUED | OUTPATIENT
Start: 2020-08-10 | End: 2021-11-18

## 2020-08-10 RX ORDER — CYANOCOBALAMIN 1000 UG/ML
1000 INJECTION INTRAMUSCULAR; SUBCUTANEOUS
Qty: 3 ML | Refills: 3 | Status: SHIPPED | OUTPATIENT
Start: 2020-08-10 | End: 2020-11-03 | Stop reason: SDUPTHER

## 2020-08-10 RX ORDER — VERAPAMIL HYDROCHLORIDE 120 MG/1
120 CAPSULE, EXTENDED RELEASE ORAL
Qty: 90 CAPSULE | Refills: 3 | Status: SHIPPED | OUTPATIENT
Start: 2020-08-10 | End: 2021-05-15

## 2020-08-10 RX ADMIN — CYANOCOBALAMIN 1000 MCG: 1000 INJECTION INTRAMUSCULAR; SUBCUTANEOUS at 12:22

## 2020-08-10 NOTE — PATIENT INSTRUCTIONS
Please follow up at [facility] in 3 months to follow up on lab work  Please return to the clinic monthly for B12 injections  Please visit a Nikunj Loomis for blood work (MMA, Lipid panel, TSH, CBC)

## 2020-08-10 NOTE — PROGRESS NOTES
[facility]  INTERNAL MEDICINE OFFICE VISIT     PATIENT INFORMATION     Sapphire Bautista   [de-identified] y o  female   MRN: [id number]    ASSESSMENT/PLAN     Diagnoses and all orders for this visit:    B12 deficiency  -     TSH + Free T4; Future  -     CBC and differential; Future  -     cyanocobalamin injection 1,000 mcg  -     cyanocobalamin 1,000 mcg/mL; Inject 1 mL (1,000 mcg total) into a muscle every 30 (thirty) days    Allergic rhinitis due to other allergic trigger, unspecified seasonality  -     loratadine (CLARITIN) 10 mg tablet; Take 1 tablet (10 mg total) by mouth daily      Schedule a follow-up appointment in 3 months  HEALTH MAINTENANCE     Immunization History   Administered Date(s) Administered    INFLUENZA 10/11/2005, 10/24/2006, 10/15/2007, 09/28/2015, 10/06/2016, 10/18/2017    Influenza Quadrivalent Preservative Free 3 years and older IM 10/06/2016    Influenza Quadrivalent, 6-35 Months IM 10/18/2017    Influenza TIV (IM) 09/24/2013, 09/24/2014, 09/28/2015    Influenza, high dose seasonal 0 5 mL 10/12/2018, 09/23/2019    Pneumococcal Conjugate 13-Valent 05/31/2016    Pneumococcal Polysaccharide PPV23 01/01/2001, 12/18/2019    Tdap 03/12/2014     CHIEF COMPLAINT     Chief Complaint   Patient presents with    Follow-up    Fatigue     would like to discuss vitmain B-12 shot      HISTORY OF PRESENT ILLNESS     The patient is an 6 year old female with a past medical history of HTN, HLD, Chronic Afib on Xarelto, SSS with PM implant, Diverticulitis, 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 and B12 deficiency who presented for a routine follow up  Overall she is feeling well  Her only complaint is that she has been experiencing mild fatigue over the last few months  She describes the fatigue as a constant tired feeling that lasts all day  She states that she has felt this way since last visit when she switched from B12 injections to PO   She states that she does not like the taste of the chewable B12 tablets and she has not been taking them  She was wondering if she can switch back to the injections  Aside from this her only other question was regarding the varicose veins on her arm, which have gotten zahida bigger recently  She denies any recent illnesses, fevers, chills, chest pain, abdominal pain, shortness of breath, nausea, vomiting or diarrhea  REVIEW OF SYSTEMS     Review of Systems   Constitutional: Positive for fatigue  Negative for activity change, appetite change, chills, diaphoresis and fever  HENT: Negative for congestion, ear discharge, ear pain, hearing loss, sinus pressure, sinus pain and sore throat  Eyes: Negative for pain, discharge, redness and itching  Respiratory: Negative for cough, chest tightness, shortness of breath and wheezing  Cardiovascular: Negative for chest pain, palpitations and leg swelling  Gastrointestinal: Negative for abdominal distention, abdominal pain, blood in stool, constipation, diarrhea, nausea and vomiting  Genitourinary: Negative for difficulty urinating, dysuria, flank pain and frequency  Musculoskeletal: Negative for arthralgias, back pain and gait problem  Skin: Negative for color change, pallor and rash  Neurological: Negative for dizziness, weakness, light-headedness, numbness and headaches  Psychiatric/Behavioral: Negative for agitation, behavioral problems, confusion and decreased concentration  OBJECTIVE     Vitals:    08/10/20 1020   BP: 126/62   BP Location: Left arm   Patient Position: Sitting   Cuff Size: Adult   Pulse: 72   Temp: (!) 97 1 °F (36 2 °C)   TempSrc: Temporal   Weight: 60 kg (132 lb 4 4 oz)     Physical Exam  Constitutional:       Appearance: Normal appearance  She is well-developed  HENT:      Head: Normocephalic and atraumatic  Nose: Nose normal    Eyes:      General:         Right eye: No discharge  Left eye: No discharge        Conjunctiva/sclera: Conjunctivae normal  Pupils: Pupils are equal, round, and reactive to light  Neck:      Vascular: No JVD  Cardiovascular:      Rate and Rhythm: Normal rate and regular rhythm  Heart sounds: Normal heart sounds  No murmur  No friction rub  No gallop  Pulmonary:      Effort: Pulmonary effort is normal  No respiratory distress  Breath sounds: Normal breath sounds  No stridor  No wheezing, rhonchi or rales  Chest:      Chest wall: No tenderness  Abdominal:      General: Bowel sounds are normal  There is no distension  Palpations: Abdomen is soft  There is no mass  Tenderness: There is no abdominal tenderness  There is no guarding or rebound  Hernia: No hernia is present  Musculoskeletal:         General: No deformity  Right lower leg: No edema  Left lower leg: No edema  Skin:     General: Skin is warm and dry  Neurological:      General: No focal deficit present  Mental Status: She is alert and oriented to person, place, and time  Psychiatric:         Mood and Affect: Mood normal          Behavior: Behavior normal          Thought Content:  Thought content normal          Judgment: Judgment normal        CURRENT MEDICATIONS     Current Outpatient Medications:     COZAAR 50 MG tablet, Take 1 tablet (50 mg total) by mouth daily, Disp: 90 tablet, Rfl: 3    indapamide (LOZOL) 2 5 mg tablet, Take 1 tablet (2 5 mg total) by mouth every morning, Disp: 90 tablet, Rfl: 3    loratadine (CLARITIN) 10 mg tablet, Take 1 tablet (10 mg total) by mouth daily, Disp: 30 tablet, Rfl: 2    nebivolol (BYSTOLIC) 5 mg tablet, Take 1 tablet (5 mg total) by mouth daily, Disp: 90 tablet, Rfl: 3    rivaroxaban (XARELTO) 15 mg tablet, Take 1 tablet (15 mg total) by mouth daily, Disp: 90 tablet, Rfl: 3    rosuvastatin (CRESTOR) 5 mg tablet, Take 1 tablet (5 mg total) by mouth daily, Disp: 90 tablet, Rfl: 3    spironolactone (ALDACTONE) 25 mg tablet, Take 1 tablet (25 mg total) by mouth daily, Disp: 90 tablet, Rfl: 3    verapamil (VERELAN PM) 120 MG 24 hr capsule, Take 1 capsule (120 mg total) by mouth daily at bedtime, Disp: 90 capsule, Rfl: 3    cyanocobalamin 1,000 mcg/mL, Inject 1 mL (1,000 mcg total) into a muscle every 30 (thirty) days, Disp: 3 mL, Rfl: 3    Current Facility-Administered Medications:     cyanocobalamin injection 1,000 mcg, 1,000 mcg, Intramuscular, Q30 Days, Buffy Alberto MD    PAST MEDICAL & SURGICAL HISTORY     Past Medical History:   Diagnosis Date    A-fib ([facility] [address] )     Anemia     Arthritis     Breast pain, right     Chest pain on breathing     Colon polyp     Cough     Diverticulosis     Encounter for screening colonoscopy     H/O sick sinus syndrome     Heart murmur     High cholesterol     History of atrial fibrillation     Rate ontrolled  On xarelto 15mg (creatinine 50) Followed by Dr Isaura Martinez with Cardiology     History of weight loss     Report loose fitting clothes  Weight stable (3lbs difference)  Last colo showed small tubular adenoma x2  Breast biopsy last showed fibrocystic changes  TSH wnl  Will check cbc, bmp, spep   Hx of long term use of blood thinners     Hypertension     Irregular heart beat     Pacemaker     Preop examination     Shortness of breath     Viral bronchitis      Past Surgical History:   Procedure Laterality Date    BREAST BIOPSY Right 08/17/2006    ultrasound guided Percutaneous Needle Core -Benign    CATARACT EXTRACTION W/ INTRAOCULAR LENS  IMPLANT, BILATERAL      COLONOSCOPY      COLONOSCOPY N/A 5/22/2018    Procedure: COLONOSCOPY;  Surgeon: Titi Barron DO;  [facility];   Service: Gastroenterology    INSERT / Princess Harrington / Dhruv Peacock Right     as a child after a fall    MAMMO STEREOTACTIC BREAST BIOPSY RIGHT (ALL INC) Right 10/2014    benign    MI CMBND ANTERPOST COLPORRAPHY W/CYSTO N/A 3/17/2016    Procedure: COLPORRHAPHY ANTERIOR POSTERIOR ;  Surgeon: Kavita Espino MD; [facility];  Service: Gynecology    MO COLONOSCOPY FLX DX W/COLLJ SPEC WHEN PFRMD N/A 4/4/2019    Procedure: COLONOSCOPY;  Surgeon: Temitope Olvera DO;  [facility]; Service: Gastroenterology    MO CYSTOURETHROSCOPY N/A 3/17/2016    Procedure: CYSTOSCOPY;  Surgeon: Harpal Amaral MD;  [facility];  Service: Gynecology    MO ESOPHAGOGASTRODUODENOSCOPY TRANSORAL DIAGNOSTIC N/A 4/16/2018    Procedure: EGD AND COLONOSCOPY;  Surgeon: Temitope Olvera DO;  [facility]; Service: Gastroenterology    MO REVAGINAL PROLAPSE,SACROSP LIG N/A 3/17/2016    Procedure: COLPOPEXY VAGINAL EXTRAPERITONEAL (VEC) ANTERIOR ;  Surgeon: Harpal Amaral MD;  [facility];  Service: Gynecology    MO SLING OPER STRES INCONTINENCE N/A 3/17/2016    Procedure: INSERTION PUBOVAGINAL SLING SINGLE INCISION ;  Surgeon: Harpal Amaral MD;  [facility];  Service: Gynecology     SOCIAL & FAMILY HISTORY     Social History     Socioeconomic History    Marital status:       Spouse name: Not on file    Number of children: Not on file    Years of education: Not on file    Highest education level: Not on file   Occupational History    Not on file   Social Needs    Financial resource strain: Somewhat hard    Food insecurity     Worry: Not on file     Inability: Not on file    Transportation needs     Medical: No     Non-medical: No   Tobacco Use    Smoking status: Never Smoker    Smokeless tobacco: Never Used   Substance and Sexual Activity    Alcohol use: No    Drug use: No    Sexual activity: Not on file     Comment:    Lifestyle    Physical activity     Days per week: Not on file     Minutes per session: Not on file    Stress: Not on file   Relationships    Social connections     Talks on phone: Not on file     Gets together: Not on file     Attends Yazidi service: Not on file     Active member of club or organization: Not on file     Attends meetings of clubs or organizations: Not on file     Relationship status: Not on file    Intimate partner violence     Fear of current or ex partner: Not on file     Emotionally abused: Not on file     Physically abused: Not on file     Forced sexual activity: Not on file   Other Topics Concern    Not on file   Social History Narrative    Housing, household, and economic circumstances      Social History     Substance and Sexual Activity   Alcohol Use No     Social History     Substance and Sexual Activity   Drug Use No     Social History     Tobacco Use   Smoking Status Never Smoker   Smokeless Tobacco Never Used     Family History   Problem Relation Age of Onset    Diabetes Mother     Breast cancer Sister 48    No Known Problems Father     No Known Problems Maternal Grandmother     No Known Problems Maternal Grandfather     No Known Problems Paternal Grandmother     No Known Problems Paternal Grandfather     No Known Problems Daughter     No Known Problems Son     No Known Problems Sister     No Known Problems Sister     No Known Problems Sister     No Known Problems Brother     No Known Problems Brother     No Known Problems Sister     No Known Problems Paternal Aunt     No Known Problems Paternal Aunt     No Known Problems Paternal Aunt     No Known Problems Paternal Aunt      ==  Altagracia Tripp MD   Resident, PGY-1  [facility] Internal Medicine [facility] [address] [facility] [address] , [address] [address] [facility] [address] [facility]  Office: [phone]  Fax: [phone]

## 2020-08-18 ENCOUNTER — APPOINTMENT (OUTPATIENT)
Dept: LAB | Facility: HOSPITAL | Age: 80
End: 2020-08-18
Payer: COMMERCIAL

## 2020-08-18 DIAGNOSIS — E78.5 HYPERLIPIDEMIA LDL GOAL <100: ICD-10-CM

## 2020-08-18 DIAGNOSIS — E53.8 B12 DEFICIENCY: ICD-10-CM

## 2020-08-18 LAB
BASOPHILS # BLD AUTO: 0.04 THOUSANDS/ΜL (ref 0–0.1)
BASOPHILS NFR BLD AUTO: 1 % (ref 0–1)
CHOLEST SERPL-MCNC: 146 MG/DL (ref 50–200)
EOSINOPHIL # BLD AUTO: 0.05 THOUSAND/ΜL (ref 0–0.61)
EOSINOPHIL NFR BLD AUTO: 1 % (ref 0–6)
ERYTHROCYTE [DISTWIDTH] IN BLOOD BY AUTOMATED COUNT: 12.3 % (ref 11.6–15.1)
HCT VFR BLD AUTO: 38.3 % (ref 34.8–46.1)
HDLC SERPL-MCNC: 57 MG/DL
HGB BLD-MCNC: 12.5 G/DL (ref 11.5–15.4)
IMM GRANULOCYTES # BLD AUTO: 0.01 THOUSAND/UL (ref 0–0.2)
IMM GRANULOCYTES NFR BLD AUTO: 0 % (ref 0–2)
LDLC SERPL CALC-MCNC: 67 MG/DL (ref 0–100)
LYMPHOCYTES # BLD AUTO: 1.38 THOUSANDS/ΜL (ref 0.6–4.47)
LYMPHOCYTES NFR BLD AUTO: 21 % (ref 14–44)
MCH RBC QN AUTO: 31.6 PG (ref 26.8–34.3)
MCHC RBC AUTO-ENTMCNC: 32.6 G/DL (ref 31.4–37.4)
MCV RBC AUTO: 97 FL (ref 82–98)
MONOCYTES # BLD AUTO: 0.52 THOUSAND/ΜL (ref 0.17–1.22)
MONOCYTES NFR BLD AUTO: 8 % (ref 4–12)
NEUTROPHILS # BLD AUTO: 4.55 THOUSANDS/ΜL (ref 1.85–7.62)
NEUTS SEG NFR BLD AUTO: 69 % (ref 43–75)
NONHDLC SERPL-MCNC: 89 MG/DL
NRBC BLD AUTO-RTO: 0 /100 WBCS
PLATELET # BLD AUTO: 189 THOUSANDS/UL (ref 149–390)
PMV BLD AUTO: 11.6 FL (ref 8.9–12.7)
RBC # BLD AUTO: 3.95 MILLION/UL (ref 3.81–5.12)
T4 FREE SERPL-MCNC: 1.04 NG/DL (ref 0.76–1.46)
TRIGL SERPL-MCNC: 108 MG/DL
TSH SERPL DL<=0.05 MIU/L-ACNC: 2.23 UIU/ML (ref 0.36–3.74)
WBC # BLD AUTO: 6.55 THOUSAND/UL (ref 4.31–10.16)

## 2020-08-18 PROCEDURE — 85025 COMPLETE CBC W/AUTO DIFF WBC: CPT

## 2020-08-18 PROCEDURE — 36415 COLL VENOUS BLD VENIPUNCTURE: CPT

## 2020-08-18 PROCEDURE — 84443 ASSAY THYROID STIM HORMONE: CPT

## 2020-08-18 PROCEDURE — 83918 ORGANIC ACIDS TOTAL QUANT: CPT

## 2020-08-18 PROCEDURE — 84439 ASSAY OF FREE THYROXINE: CPT

## 2020-08-18 PROCEDURE — 80061 LIPID PANEL: CPT

## 2020-08-20 LAB
METHYLMALONATE SERPL-SCNC: 232 NMOL/L (ref 0–378)
SL AMB DISCLAIMER: NORMAL

## 2020-09-02 ENCOUNTER — OFFICE VISIT (OUTPATIENT)
Dept: PODIATRY | Facility: CLINIC | Age: 80
End: 2020-09-02
Payer: COMMERCIAL

## 2020-09-02 VITALS
TEMPERATURE: 96.5 F | HEIGHT: 62 IN | HEART RATE: 68 BPM | SYSTOLIC BLOOD PRESSURE: 120 MMHG | DIASTOLIC BLOOD PRESSURE: 76 MMHG | BODY MASS INDEX: 24.19 KG/M2

## 2020-09-02 DIAGNOSIS — B35.1 ONYCHOMYCOSIS: Primary | ICD-10-CM

## 2020-09-02 DIAGNOSIS — M79.675 PAIN IN TOE OF LEFT FOOT: ICD-10-CM

## 2020-09-02 PROCEDURE — 11720 DEBRIDE NAIL 1-5: CPT | Performed by: PODIATRIST

## 2020-09-02 NOTE — PROGRESS NOTES
Patient presents for nail care  Each great toenail is thickened yellow with subungual debris  However, it is the left great toenail that is painful  This nail is thicker than the right and shoe pressure on it causes pain  On exam, pedal pulses are within normal limits  Each toenail is elongated  The right and left great toenails are thickened yellow with subungual debris  Left great toenail is painful with direct pressure  Treatment:  Debridement of left great toenail performed reducing toenail in thickness and removing devitalized tissue and debris  This debridement was both manual and electrical   All other toenails were trimmed  Patient will be rescheduled in 3 months

## 2020-09-03 ENCOUNTER — TELEPHONE (OUTPATIENT)
Dept: INTERNAL MEDICINE CLINIC | Facility: CLINIC | Age: 80
End: 2020-09-03

## 2020-09-03 NOTE — TELEPHONE ENCOUNTER
Patient called office stating she received call from Elenita El stating vitamin B12 injections are not covered by her insurance  A prior Auth for vitamin B12 injections was started and faxed to Layton Ramires  Will wait for response

## 2020-09-08 ENCOUNTER — CLINICAL SUPPORT (OUTPATIENT)
Dept: INTERNAL MEDICINE CLINIC | Facility: CLINIC | Age: 80
End: 2020-09-08

## 2020-09-08 DIAGNOSIS — E53.8 B12 DEFICIENCY: Chronic | ICD-10-CM

## 2020-09-08 PROCEDURE — 96372 THER/PROPH/DIAG INJ SC/IM: CPT | Performed by: INTERNAL MEDICINE

## 2020-09-08 RX ADMIN — CYANOCOBALAMIN 1000 MCG: 1000 INJECTION INTRAMUSCULAR; SUBCUTANEOUS at 10:49

## 2020-09-08 NOTE — PROGRESS NOTES
Patient seen here in office today for nurse visit for her monthly b12 injections  Administered 1mL into patients right deltoid  Patient tolerated well

## 2020-09-10 NOTE — TELEPHONE ENCOUNTER
Contacted insurance to verify prior auth status and they informed me that the prior auth was DENIED as a benefit exclusion  Per insurance rep insurance does not cover vitamins or minerals  Prior auth placed in the scanning bin and I will make patient aware

## 2020-09-14 NOTE — TELEPHONE ENCOUNTER
Called patient to inform her injections are not covered  Patient was already aware, however, her son informed her she has some coverage through [facility] that she did not make [facility] aware of  Patient will call [facility] with updated insurance information and she will ask them to run the medication through the insurance

## 2020-09-15 DIAGNOSIS — E53.8 B12 DEFICIENCY: Chronic | ICD-10-CM

## 2020-09-16 RX ORDER — CYANOCOBALAMIN 1000 UG/ML
INJECTION INTRAMUSCULAR; SUBCUTANEOUS
Qty: 3 ML | Refills: 2 | OUTPATIENT
Start: 2020-09-16

## 2020-09-23 ENCOUNTER — IMMUNIZATIONS (OUTPATIENT)
Dept: INTERNAL MEDICINE CLINIC | Facility: CLINIC | Age: 80
End: 2020-09-23

## 2020-09-23 DIAGNOSIS — Z23 NEED FOR INFLUENZA VACCINATION: Primary | ICD-10-CM

## 2020-09-23 PROCEDURE — 90662 IIV NO PRSV INCREASED AG IM: CPT | Performed by: INTERNAL MEDICINE

## 2020-09-23 PROCEDURE — G0008 ADMIN INFLUENZA VIRUS VAC: HCPCS | Performed by: INTERNAL MEDICINE

## 2020-09-23 NOTE — PROGRESS NOTES
Patient came into the office for nurse visit for flu administration   HIGH DOSE given in left deltiod

## 2020-10-13 ENCOUNTER — CLINICAL SUPPORT (OUTPATIENT)
Dept: INTERNAL MEDICINE CLINIC | Facility: CLINIC | Age: 80
End: 2020-10-13

## 2020-10-13 DIAGNOSIS — E53.8 B12 DEFICIENCY: Primary | ICD-10-CM

## 2020-10-13 PROCEDURE — 96372 THER/PROPH/DIAG INJ SC/IM: CPT | Performed by: INTERNAL MEDICINE

## 2020-10-13 RX ADMIN — CYANOCOBALAMIN 1000 MCG: 1000 INJECTION INTRAMUSCULAR; SUBCUTANEOUS at 10:51

## 2020-10-23 ENCOUNTER — IN-CLINIC DEVICE VISIT (OUTPATIENT)
Dept: CARDIOLOGY CLINIC | Facility: CLINIC | Age: 80
End: 2020-10-23
Payer: COMMERCIAL

## 2020-10-23 DIAGNOSIS — Z95.0 PACEMAKER: Primary | ICD-10-CM

## 2020-10-23 PROCEDURE — 93279 PRGRMG DEV EVAL PM/LDLS PM: CPT | Performed by: INTERNAL MEDICINE

## 2020-11-03 DIAGNOSIS — E53.8 B12 DEFICIENCY: Chronic | ICD-10-CM

## 2020-11-03 DIAGNOSIS — I48.20 CHRONIC ATRIAL FIBRILLATION (HCC): ICD-10-CM

## 2020-11-03 DIAGNOSIS — J30.89 ALLERGIC RHINITIS DUE TO OTHER ALLERGIC TRIGGER, UNSPECIFIED SEASONALITY: ICD-10-CM

## 2020-11-03 DIAGNOSIS — E78.5 HYPERLIPIDEMIA LDL GOAL <100: ICD-10-CM

## 2020-11-04 RX ORDER — ROSUVASTATIN CALCIUM 5 MG/1
5 TABLET, COATED ORAL DAILY
Qty: 90 TABLET | Refills: 3 | Status: SHIPPED | OUTPATIENT
Start: 2020-11-04 | End: 2021-08-23 | Stop reason: HOSPADM

## 2020-11-04 RX ORDER — LORATADINE 10 MG/1
10 TABLET ORAL DAILY
Qty: 30 TABLET | Refills: 2 | Status: SHIPPED | OUTPATIENT
Start: 2020-11-04 | End: 2021-01-18

## 2020-11-04 RX ORDER — CYANOCOBALAMIN 1000 UG/ML
1000 INJECTION INTRAMUSCULAR; SUBCUTANEOUS
Qty: 3 ML | Refills: 3 | Status: SHIPPED | OUTPATIENT
Start: 2020-11-04 | End: 2021-08-31

## 2020-11-16 ENCOUNTER — CLINICAL SUPPORT (OUTPATIENT)
Dept: INTERNAL MEDICINE CLINIC | Facility: CLINIC | Age: 80
End: 2020-11-16

## 2020-11-16 DIAGNOSIS — E53.8 B12 DEFICIENCY: Primary | ICD-10-CM

## 2020-11-16 RX ADMIN — CYANOCOBALAMIN 1000 MCG: 1000 INJECTION INTRAMUSCULAR; SUBCUTANEOUS at 09:47

## 2020-11-25 ENCOUNTER — OFFICE VISIT (OUTPATIENT)
Dept: CARDIOLOGY CLINIC | Facility: CLINIC | Age: 80
End: 2020-11-25
Payer: COMMERCIAL

## 2020-11-25 VITALS
HEART RATE: 79 BPM | DIASTOLIC BLOOD PRESSURE: 80 MMHG | HEIGHT: 62 IN | WEIGHT: 138 LBS | BODY MASS INDEX: 25.4 KG/M2 | TEMPERATURE: 97.8 F | SYSTOLIC BLOOD PRESSURE: 128 MMHG

## 2020-11-25 DIAGNOSIS — I10 HYPERTENSION, UNSPECIFIED TYPE: ICD-10-CM

## 2020-11-25 DIAGNOSIS — I48.20 CHRONIC ATRIAL FIBRILLATION (HCC): Primary | ICD-10-CM

## 2020-11-25 PROCEDURE — 3079F DIAST BP 80-89 MM HG: CPT | Performed by: INTERNAL MEDICINE

## 2020-11-25 PROCEDURE — 1036F TOBACCO NON-USER: CPT | Performed by: INTERNAL MEDICINE

## 2020-11-25 PROCEDURE — 93000 ELECTROCARDIOGRAM COMPLETE: CPT | Performed by: INTERNAL MEDICINE

## 2020-11-25 PROCEDURE — 3074F SYST BP LT 130 MM HG: CPT | Performed by: INTERNAL MEDICINE

## 2020-11-25 PROCEDURE — 1160F RVW MEDS BY RX/DR IN RCRD: CPT | Performed by: INTERNAL MEDICINE

## 2020-11-25 PROCEDURE — 99214 OFFICE O/P EST MOD 30 MIN: CPT | Performed by: INTERNAL MEDICINE

## 2020-11-25 RX ORDER — INDAPAMIDE 2.5 MG/1
2.5 TABLET, FILM COATED ORAL EVERY MORNING
Qty: 90 TABLET | Refills: 3 | OUTPATIENT
Start: 2020-11-25

## 2020-11-25 RX ORDER — FUROSEMIDE 20 MG/1
20 TABLET ORAL DAILY
Qty: 90 TABLET | Refills: 3 | Status: SHIPPED | OUTPATIENT
Start: 2020-11-25 | End: 2021-09-02

## 2020-11-27 ENCOUNTER — OFFICE VISIT (OUTPATIENT)
Dept: INTERNAL MEDICINE CLINIC | Facility: CLINIC | Age: 80
End: 2020-11-27

## 2020-11-27 VITALS
DIASTOLIC BLOOD PRESSURE: 68 MMHG | HEIGHT: 62 IN | BODY MASS INDEX: 24.84 KG/M2 | HEART RATE: 88 BPM | OXYGEN SATURATION: 97 % | TEMPERATURE: 97.5 F | SYSTOLIC BLOOD PRESSURE: 120 MMHG | WEIGHT: 135 LBS

## 2020-11-27 DIAGNOSIS — E53.8 B12 DEFICIENCY: Primary | ICD-10-CM

## 2020-11-27 PROCEDURE — 1036F TOBACCO NON-USER: CPT | Performed by: INTERNAL MEDICINE

## 2020-11-27 PROCEDURE — 1160F RVW MEDS BY RX/DR IN RCRD: CPT | Performed by: INTERNAL MEDICINE

## 2020-11-27 PROCEDURE — 99213 OFFICE O/P EST LOW 20 MIN: CPT | Performed by: INTERNAL MEDICINE

## 2020-12-07 ENCOUNTER — LAB (OUTPATIENT)
Dept: LAB | Facility: HOSPITAL | Age: 80
End: 2020-12-07
Attending: INTERNAL MEDICINE
Payer: COMMERCIAL

## 2020-12-07 DIAGNOSIS — I48.20 CHRONIC ATRIAL FIBRILLATION (HCC): ICD-10-CM

## 2020-12-07 LAB
ANION GAP SERPL CALCULATED.3IONS-SCNC: 7 MMOL/L (ref 4–13)
BUN SERPL-MCNC: 21 MG/DL (ref 5–25)
CALCIUM SERPL-MCNC: 9.8 MG/DL (ref 8.3–10.1)
CHLORIDE SERPL-SCNC: 108 MMOL/L (ref 100–108)
CO2 SERPL-SCNC: 28 MMOL/L (ref 21–32)
CREAT SERPL-MCNC: 0.98 MG/DL (ref 0.6–1.3)
GFR SERPL CREATININE-BSD FRML MDRD: 55 ML/MIN/1.73SQ M
GLUCOSE P FAST SERPL-MCNC: 80 MG/DL (ref 65–99)
NT-PROBNP SERPL-MCNC: 807 PG/ML
POTASSIUM SERPL-SCNC: 3.6 MMOL/L (ref 3.5–5.3)
SODIUM SERPL-SCNC: 143 MMOL/L (ref 136–145)

## 2020-12-07 PROCEDURE — 36415 COLL VENOUS BLD VENIPUNCTURE: CPT

## 2020-12-07 PROCEDURE — 80048 BASIC METABOLIC PNL TOTAL CA: CPT

## 2020-12-07 PROCEDURE — 83880 ASSAY OF NATRIURETIC PEPTIDE: CPT

## 2020-12-16 ENCOUNTER — CLINICAL SUPPORT (OUTPATIENT)
Dept: INTERNAL MEDICINE CLINIC | Facility: CLINIC | Age: 80
End: 2020-12-16

## 2020-12-16 DIAGNOSIS — E53.8 B12 DEFICIENCY: Chronic | ICD-10-CM

## 2020-12-16 PROCEDURE — 96372 THER/PROPH/DIAG INJ SC/IM: CPT | Performed by: INTERNAL MEDICINE

## 2020-12-16 RX ADMIN — CYANOCOBALAMIN 1000 MCG: 1000 INJECTION INTRAMUSCULAR; SUBCUTANEOUS at 09:42

## 2021-01-18 ENCOUNTER — CLINICAL SUPPORT (OUTPATIENT)
Dept: INTERNAL MEDICINE CLINIC | Facility: CLINIC | Age: 81
End: 2021-01-18

## 2021-01-18 ENCOUNTER — TELEPHONE (OUTPATIENT)
Dept: INTERNAL MEDICINE CLINIC | Facility: CLINIC | Age: 81
End: 2021-01-18

## 2021-01-18 DIAGNOSIS — E53.8 B12 DEFICIENCY: Primary | ICD-10-CM

## 2021-01-18 DIAGNOSIS — J30.89 ALLERGIC RHINITIS DUE TO OTHER ALLERGIC TRIGGER, UNSPECIFIED SEASONALITY: ICD-10-CM

## 2021-01-18 RX ORDER — LORATADINE 10 MG/1
10 TABLET ORAL DAILY
Qty: 30 TABLET | Refills: 2 | Status: SHIPPED | OUTPATIENT
Start: 2021-01-18 | End: 2021-04-03

## 2021-01-18 RX ADMIN — CYANOCOBALAMIN 1000 MCG: 1000 INJECTION INTRAMUSCULAR; SUBCUTANEOUS at 09:45

## 2021-01-18 NOTE — PROGRESS NOTES
Patient came into the office today for Vit  B12 injection   Patient provided serum and 1 0 ML was given in left deltoid

## 2021-01-20 DIAGNOSIS — Z12.31 ENCOUNTER FOR SCREENING MAMMOGRAM FOR MALIGNANT NEOPLASM OF BREAST: Primary | ICD-10-CM

## 2021-01-27 ENCOUNTER — HOSPITAL ENCOUNTER (OUTPATIENT)
Dept: MAMMOGRAPHY | Facility: HOSPITAL | Age: 81
Discharge: HOME | End: 2021-01-27
Payer: COMMERCIAL

## 2021-01-27 VITALS — HEIGHT: 62 IN | WEIGHT: 135 LBS | BODY MASS INDEX: 24.84 KG/M2

## 2021-01-27 DIAGNOSIS — Z12.31 ENCOUNTER FOR SCREENING MAMMOGRAM FOR MALIGNANT NEOPLASM OF BREAST: ICD-10-CM

## 2021-01-27 PROCEDURE — 77067 SCR MAMMO BI INCL CAD: CPT

## 2021-01-27 PROCEDURE — 77063 BREAST TOMOSYNTHESIS BI: CPT

## 2021-02-10 ENCOUNTER — REMOTE DEVICE CLINIC VISIT (OUTPATIENT)
Dept: CARDIOLOGY CLINIC | Facility: CLINIC | Age: 81
End: 2021-02-10
Payer: COMMERCIAL

## 2021-02-10 DIAGNOSIS — Z95.0 CARDIAC PACEMAKER IN SITU: Primary | ICD-10-CM

## 2021-02-10 PROCEDURE — 93296 REM INTERROG EVL PM/IDS: CPT | Performed by: INTERNAL MEDICINE

## 2021-02-10 PROCEDURE — 93294 REM INTERROG EVL PM/LDLS PM: CPT | Performed by: INTERNAL MEDICINE

## 2021-02-10 NOTE — PROGRESS NOTES
Results for orders placed or performed in visit on 02/10/21   Cardiac EP device report    Narrative    MDT-SINGLE CHAMBER PPM / NOT MRI CONDITIONAL  CARELINK TRANSMISSION: BATTERY STATUS "OK"   55%  ALL AVAILABLE LEAD PARAMETERS WITHIN NORMAL LIMITS  NO SIGNIFICANT HIGH RATE EPISODES  NORMAL DEVICE FUNCTION   NC       Current Outpatient Medications:     COZAAR 50 MG tablet, Take 1 tablet (50 mg total) by mouth daily, Disp: 90 tablet, Rfl: 3    cyanocobalamin 1,000 mcg/mL, Inject 1 mL (1,000 mcg total) into a muscle every 30 (thirty) days, Disp: 3 mL, Rfl: 3    furosemide (LASIX) 20 mg tablet, Take 1 tablet (20 mg total) by mouth daily, Disp: 90 tablet, Rfl: 3    loratadine (CLARITIN) 10 mg tablet, TAKE 1 TABLET (10 MG TOTAL) BY MOUTH DAILY, Disp: 30 tablet, Rfl: 2    nebivolol (BYSTOLIC) 5 mg tablet, Take 1 tablet (5 mg total) by mouth daily, Disp: 90 tablet, Rfl: 3    rivaroxaban (Xarelto) 15 mg tablet, Take 1 tablet (15 mg total) by mouth daily, Disp: 90 tablet, Rfl: 3    rosuvastatin (CRESTOR) 5 mg tablet, Take 1 tablet (5 mg total) by mouth daily, Disp: 90 tablet, Rfl: 3    spironolactone (ALDACTONE) 25 mg tablet, Take 1 tablet (25 mg total) by mouth daily, Disp: 90 tablet, Rfl: 3    verapamil (VERELAN PM) 120 MG 24 hr capsule, Take 1 capsule (120 mg total) by mouth daily at bedtime, Disp: 90 capsule, Rfl: 3    Current Facility-Administered Medications:     cyanocobalamin injection 1,000 mcg, 1,000 mcg, Intramuscular, Q30 Days, Dada Nuñez MD, 1,000 mcg at 01/18/21 0919

## 2021-02-17 ENCOUNTER — CLINICAL SUPPORT (OUTPATIENT)
Dept: INTERNAL MEDICINE CLINIC | Facility: CLINIC | Age: 81
End: 2021-02-17

## 2021-02-17 DIAGNOSIS — E53.8 B12 DEFICIENCY: Primary | ICD-10-CM

## 2021-02-17 PROCEDURE — 96372 THER/PROPH/DIAG INJ SC/IM: CPT | Performed by: INTERNAL MEDICINE

## 2021-02-17 RX ADMIN — CYANOCOBALAMIN 1000 MCG: 1000 INJECTION INTRAMUSCULAR; SUBCUTANEOUS at 15:21

## 2021-02-17 NOTE — PROGRESS NOTES
Patient seen today in office for her B12 injection  Administered 1 0mL in patients left deltoid, patient tolerated well

## 2021-03-18 ENCOUNTER — TELEMEDICINE (OUTPATIENT)
Dept: INTERNAL MEDICINE CLINIC | Facility: CLINIC | Age: 81
End: 2021-03-18

## 2021-03-18 ENCOUNTER — TELEPHONE (OUTPATIENT)
Dept: INTERNAL MEDICINE CLINIC | Facility: CLINIC | Age: 81
End: 2021-03-18

## 2021-03-18 DIAGNOSIS — R50.9 FEVER, UNSPECIFIED FEVER CAUSE: Primary | ICD-10-CM

## 2021-03-18 DIAGNOSIS — R09.81 CONGESTION OF NASAL SINUS: ICD-10-CM

## 2021-03-18 PROCEDURE — G2025 DIS SITE TELE SVCS RHC/FQHC: HCPCS | Performed by: INTERNAL MEDICINE

## 2021-03-18 RX ORDER — FLUTICASONE PROPIONATE 50 MCG
1 SPRAY, SUSPENSION (ML) NASAL DAILY
Qty: 1 BOTTLE | Refills: 1 | Status: SHIPPED | OUTPATIENT
Start: 2021-03-18 | End: 2021-11-19

## 2021-03-18 NOTE — TELEPHONE ENCOUNTER
Patient called our office and Seth Frazier answer the phone  Per Marisa " [8:55 AM] Tammy Hackett      Clinical- I have Oz Perkins [birth year] on the line  she is currently experience a sore throat and has a fever 100  she is scheduled to have her 2nd covid shot tomorrow "    I spoke with Dr Krissy Palma regarding this patient and we went to ask Dr Hai Reyes  Per Dr Hai Reyes if the patient have some high grade of temperature because she drink some coffee or something is fine to have the vaccine but because patient is presenting symptoms like the sore throat is better to have the patient get tested for covid-19 to rule out that she have the virus and get everyone exposed when she goes tomorrow 3/19 to her appt for her second dose of covid-19  Per Dr Hai Reyes patient need an virtual appt to discuss this with an provider  I called patient and triaged  Per patient last night her temperature was 99 8 f and she wakes up covered on sweat this morning and her temperature was 100 1 f  Per patient her sore throat start since yesterday  Per patient she doesn't have any other symptoms  I made patient aware about Dr Hai Reyes decision and why she was scheduled  Patient is agreeable with the decision and per patient request her phone doesn't have an camera and need to be telephone only   Appt scheduled for today with Dr Cachorro Miranda at 11:30 am

## 2021-03-18 NOTE — PROGRESS NOTES
COVID-19 Virtual Visit     Assessment/Plan:    Problem List Items Addressed This Visit     None      Visit Diagnoses     Fever, unspecified fever cause    -  Primary - Patient with 24 hour history of temperature of  associated with nasal congestion  + myalgias  No chest pain, no shortness of breath, no cough, sore throat has resolved feel unlikely to be strept throat as with mild temperature not meeting fever criteria, with throat pain that has resolved, and mild congestion and post nasal drip  Will send in for COVID test and hold off on vaccine till results, given strict RTC instructions including any fever, chills, shortness of breath, cough, chest pain or any other associated signs or symptoms  Relevant Orders    Novel Coronavirus (Covid-19),PCR [facility] - Collected at Mobile Vans or [facility]    Congestion of nasal sinus   - Patient given flonase for symptomatic relief given RTC instructions             Disposition:     I have spent 15 minutes directly with the patient  Encounter provider Dann Benitez DO    Provider located at [address] [facility]  [id number] [place] [address] 30  [address]  [address] [facility] [id number] [phone]    Recent Visits  No visits were found meeting these conditions  Showing recent visits within past 7 days and meeting all other requirements     Today's Visits  Date Type Provider Dept   03/18/21 Telephone 121 [facility], 1234 [facility]   03/18/21 1121 [facility], 960 [facility] today's visits and meeting all other requirements     Future Appointments  No visits were found meeting these conditions  Showing future appointments within next 150 days and meeting all other requirements        Patient agrees to participate in a virtual check in via telephone or video visit instead of presenting to the office to address urgent/immediate medical needs   Patient is aware this is a billable service  After connecting through Telephone, the patient was identified by name and date of birth  Anjel Patel was informed that this was a telemedicine visit and that the exam was being conducted confidentially over secure lines  Anjel Patel acknowledged consent and understanding of privacy and security of the telemedicine visit  I informed the patient that I have reviewed her record in Epic and presented the opportunity for her to ask any questions regarding the visit today  The patient agreed to participate  Subjective:   Anjel Patel is a [de-identified] y o  female who is concerned about COVID-19  Patient's symptoms include fever, nasal congestion and rhinorrhea  Patient denies fatigue, malaise, anosmia, shortness of breath, chest tightness, vomiting and myalgias  Date of symptom onset: 3/17/2021       It was my intent to perform this visit via video technology but the patient was not able to do a video connection so the visit was completed via audio telephone only  Patient is [de-identified]year old female pmh of A fib, HTN, renal artery stenosis, OA  She reports that she got her first COVID vaccine on February 19, 2021  She is supposed to have second one tomorrow  She reports that she has a sore throat, as well as fever, she reports that her sore throat has resolved  Her temperature was 99 8, last night was 100 0 PO  She denies any associated cough,, she is unsure of sick contacts  She reports that she is not sure whether she has any tonsillar exudates  She states she has muscle aches as well  No chest pain  No shortness of breath  No ansomia or change in taste  She reports some nasal congestion and post nasal drip  Unable to tell me which color  She has not been taking anything for attempted alleviation of her symptoms  She denies any other associated signs or symptoms     Lab Results   Component Value Date    SARSCOV2 Not Detected 06/10/2020     Past Medical History:   Diagnosis Date    A-fib [facility])  Anemia     Arthritis     Breast pain, right     Chest pain on breathing     Colon polyp     Cough     Diverticulosis     Encounter for screening colonoscopy     H/O sick sinus syndrome     Heart murmur     High cholesterol     History of atrial fibrillation     Rate ontrolled  On xarelto 15mg (creatinine 50) Followed by Dr Katarzyna Livingston with Cardiology     History of weight loss     Report loose fitting clothes  Weight stable (3lbs difference)  Last colo showed small tubular adenoma x2  Breast biopsy last showed fibrocystic changes  TSH wnl  Will check cbc, bmp, spep   Hx of long term use of blood thinners     Hypertension     Irregular heart beat     Pacemaker     Preop examination     Shortness of breath     Viral bronchitis      Past Surgical History:   Procedure Laterality Date    BREAST BIOPSY Right 08/17/2006    ultrasound guided Percutaneous Needle Core -Benign    CATARACT EXTRACTION W/ INTRAOCULAR LENS  IMPLANT, BILATERAL      COLONOSCOPY      COLONOSCOPY N/A 5/22/2018    Procedure: COLONOSCOPY;  Surgeon: Swati Ford DO;  [facility]; Service: Gastroenterology    Ernie Wagner / Shira Roa / Joel Barr Right     as a child after a fall    MAMMO STEREOTACTIC BREAST BIOPSY RIGHT (ALL INC) Right 10/2014    benign    MD CMBND ANTERPOST COLPORRAPHY W/CYSTO N/A 3/17/2016    Procedure: COLPORRHAPHY ANTERIOR POSTERIOR ;  Surgeon: Shu Bruce MD;  [facility];  Service: Gynecology    MD COLONOSCOPY FLX DX W/[facility] WHEN PFRMD N/A 4/4/2019    Procedure: COLONOSCOPY;  Surgeon: Swati Ford DO;  [facility];   Service: Gastroenterology    MD CYSTOURETHROSCOPY N/A 3/17/2016    Procedure: CYSTOSCOPY;  Surgeon: Shu Bruce MD;  [facility];  Service: Gynecology    MD ESOPHAGOGASTRODUODENOSCOPY TRANSORAL DIAGNOSTIC N/A 4/16/2018    Procedure: EGD AND COLONOSCOPY;  Surgeon: Swati Ford DO;  [facility]; Service: Gastroenterology    CO Steve Galeano LIG N/A 3/17/2016    Procedure: COLPOPEXY VAGINAL EXTRAPERITONEAL (VEC) ANTERIOR ;  Surgeon: Ez Tan MD;  [facility];  Service: Gynecology    CO SLING OPER STRES INCONTINENCE N/A 3/17/2016    Procedure: INSERTION PUBOVAGINAL SLING SINGLE INCISION ;  Surgeon: Ez Tan MD;  [facility];  Service: Gynecology     Current Outpatient Medications   Medication Sig Dispense Refill    COZAAR 50 MG tablet Take 1 tablet (50 mg total) by mouth daily 90 tablet 3    cyanocobalamin 1,000 mcg/mL Inject 1 mL (1,000 mcg total) into a muscle every 30 (thirty) days 3 mL 3    furosemide (LASIX) 20 mg tablet Take 1 tablet (20 mg total) by mouth daily 90 tablet 3    loratadine (CLARITIN) 10 mg tablet TAKE 1 TABLET (10 MG TOTAL) BY MOUTH DAILY 30 tablet 2    nebivolol (BYSTOLIC) 5 mg tablet Take 1 tablet (5 mg total) by mouth daily 90 tablet 3    rivaroxaban (Xarelto) 15 mg tablet Take 1 tablet (15 mg total) by mouth daily 90 tablet 3    rosuvastatin (CRESTOR) 5 mg tablet Take 1 tablet (5 mg total) by mouth daily 90 tablet 3    spironolactone (ALDACTONE) 25 mg tablet Take 1 tablet (25 mg total) by mouth daily 90 tablet 3    verapamil (VERELAN PM) 120 MG 24 hr capsule Take 1 capsule (120 mg total) by mouth daily at bedtime 90 capsule 3     Current Facility-Administered Medications   Medication Dose Route Frequency Provider Last Rate Last Admin    cyanocobalamin injection 1,000 mcg  1,000 mcg Intramuscular Q30 Days Gale Mina MD   1,000 mcg at 02/17/21 1521     Allergies   Allergen Reactions    Other Itching     Bandaids    Tape [Medical Tape] Itching       Review of Systems   Constitutional: Positive for fever  Negative for fatigue  HENT: Positive for congestion and rhinorrhea  Respiratory: Negative for chest tightness and shortness of breath  Gastrointestinal: Negative for vomiting     Musculoskeletal: Negative for myalgias  Objective:    Vitals:       Physical Exam  Constitutional:       Comments: Does not sound in acute distress   Neurological:      Comments: Clear speech  VIRTUAL VISIT DISCLAIMER    Jessica Eliud acknowledges that she has consented to an online visit or consultation  She understands that the online visit is based solely on information provided by her, and that, in the absence of a face-to-face physical evaluation by the physician, the diagnosis she receives is both limited and provisional in terms of accuracy and completeness  This is not intended to replace a full medical face-to-face evaluation by the physician  Jessica Kline understands and accepts these terms

## 2021-03-19 ENCOUNTER — APPOINTMENT (EMERGENCY)
Dept: RADIOLOGY | Facility: HOSPITAL | Age: 81
End: 2021-03-19
Payer: COMMERCIAL

## 2021-03-19 ENCOUNTER — HOSPITAL ENCOUNTER (EMERGENCY)
Facility: HOSPITAL | Age: 81
Discharge: HOME | End: 2021-03-20
Attending: EMERGENCY MEDICINE
Payer: COMMERCIAL

## 2021-03-19 VITALS
TEMPERATURE: 98.2 F | WEIGHT: 135 LBS | RESPIRATION RATE: 18 BRPM | OXYGEN SATURATION: 98 % | BODY MASS INDEX: 23.92 KG/M2 | HEIGHT: 63 IN | HEART RATE: 83 BPM | SYSTOLIC BLOOD PRESSURE: 183 MMHG | DIASTOLIC BLOOD PRESSURE: 79 MMHG

## 2021-03-19 DIAGNOSIS — I10 HYPERTENSION: Primary | ICD-10-CM

## 2021-03-19 DIAGNOSIS — R53.83 FATIGUE: ICD-10-CM

## 2021-03-19 DIAGNOSIS — R50.9 FEVER, UNSPECIFIED FEVER CAUSE: ICD-10-CM

## 2021-03-19 LAB
ALBUMIN SERPL BCP-MCNC: 3.2 G/DL (ref 3.5–5)
ALP SERPL-CCNC: 88 U/L (ref 46–116)
ALT SERPL W P-5'-P-CCNC: 22 U/L (ref 12–78)
ANION GAP SERPL CALCULATED.3IONS-SCNC: 6 MMOL/L (ref 4–13)
AST SERPL W P-5'-P-CCNC: 17 U/L (ref 5–45)
BACTERIA UR QL AUTO: ABNORMAL /HPF
BASOPHILS # BLD AUTO: 0.03 THOUSANDS/ΜL (ref 0–0.1)
BASOPHILS NFR BLD AUTO: 0 % (ref 0–1)
BILIRUB SERPL-MCNC: 0.58 MG/DL (ref 0.2–1)
BILIRUB UR QL STRIP: NEGATIVE
BUN SERPL-MCNC: 18 MG/DL (ref 5–25)
CALCIUM ALBUM COR SERPL-MCNC: 9.9 MG/DL (ref 8.3–10.1)
CALCIUM SERPL-MCNC: 9.3 MG/DL (ref 8.3–10.1)
CHLORIDE SERPL-SCNC: 109 MMOL/L (ref 100–108)
CLARITY UR: CLEAR
CO2 SERPL-SCNC: 25 MMOL/L (ref 21–32)
COLOR UR: YELLOW
COLOR, POC: NORMAL
CREAT SERPL-MCNC: 0.95 MG/DL (ref 0.6–1.3)
EOSINOPHIL # BLD AUTO: 0.18 THOUSAND/ΜL (ref 0–0.61)
EOSINOPHIL NFR BLD AUTO: 2 % (ref 0–6)
ERYTHROCYTE [DISTWIDTH] IN BLOOD BY AUTOMATED COUNT: 12.8 % (ref 11.6–15.1)
GFR SERPL CREATININE-BSD FRML MDRD: 57 ML/MIN/1.73SQ M
GLUCOSE SERPL-MCNC: 80 MG/DL (ref 65–140)
GLUCOSE UR STRIP-MCNC: NEGATIVE MG/DL
HCT VFR BLD AUTO: 36.6 % (ref 34.8–46.1)
HGB BLD-MCNC: 11.8 G/DL (ref 11.5–15.4)
HGB UR QL STRIP.AUTO: ABNORMAL
HYALINE CASTS #/AREA URNS LPF: ABNORMAL /LPF
IMM GRANULOCYTES # BLD AUTO: 0.02 THOUSAND/UL (ref 0–0.2)
IMM GRANULOCYTES NFR BLD AUTO: 0 % (ref 0–2)
KETONES UR STRIP-MCNC: NEGATIVE MG/DL
LEUKOCYTE ESTERASE UR QL STRIP: ABNORMAL
LYMPHOCYTES # BLD AUTO: 1.37 THOUSANDS/ΜL (ref 0.6–4.47)
LYMPHOCYTES NFR BLD AUTO: 18 % (ref 14–44)
MCH RBC QN AUTO: 30.6 PG (ref 26.8–34.3)
MCHC RBC AUTO-ENTMCNC: 32.2 G/DL (ref 31.4–37.4)
MCV RBC AUTO: 95 FL (ref 82–98)
MONOCYTES # BLD AUTO: 0.53 THOUSAND/ΜL (ref 0.17–1.22)
MONOCYTES NFR BLD AUTO: 7 % (ref 4–12)
NEUTROPHILS # BLD AUTO: 5.38 THOUSANDS/ΜL (ref 1.85–7.62)
NEUTS SEG NFR BLD AUTO: 73 % (ref 43–75)
NITRITE UR QL STRIP: NEGATIVE
NON-SQ EPI CELLS URNS QL MICRO: ABNORMAL /HPF
NRBC BLD AUTO-RTO: 0 /100 WBCS
PH UR STRIP.AUTO: 7 [PH] (ref 4.5–8)
PLATELET # BLD AUTO: 301 THOUSANDS/UL (ref 149–390)
PMV BLD AUTO: 10.2 FL (ref 8.9–12.7)
POTASSIUM SERPL-SCNC: 3.7 MMOL/L (ref 3.5–5.3)
PROT SERPL-MCNC: 7.5 G/DL (ref 6.4–8.2)
PROT UR STRIP-MCNC: NEGATIVE MG/DL
RBC # BLD AUTO: 3.86 MILLION/UL (ref 3.81–5.12)
RBC #/AREA URNS AUTO: ABNORMAL /HPF
SODIUM SERPL-SCNC: 140 MMOL/L (ref 136–145)
SP GR UR STRIP.AUTO: 1.02 (ref 1–1.03)
TROPONIN I SERPL-MCNC: <0.02 NG/ML
TSH SERPL DL<=0.05 MIU/L-ACNC: 2.02 UIU/ML (ref 0.36–3.74)
UROBILINOGEN UR QL STRIP.AUTO: 4 E.U./DL
WBC # BLD AUTO: 7.51 THOUSAND/UL (ref 4.31–10.16)
WBC #/AREA URNS AUTO: ABNORMAL /HPF

## 2021-03-19 PROCEDURE — 85025 COMPLETE CBC W/AUTO DIFF WBC: CPT | Performed by: EMERGENCY MEDICINE

## 2021-03-19 PROCEDURE — 99285 EMERGENCY DEPT VISIT HI MDM: CPT | Performed by: EMERGENCY MEDICINE

## 2021-03-19 PROCEDURE — 93005 ELECTROCARDIOGRAM TRACING: CPT

## 2021-03-19 PROCEDURE — 81001 URINALYSIS AUTO W/SCOPE: CPT

## 2021-03-19 PROCEDURE — 84443 ASSAY THYROID STIM HORMONE: CPT | Performed by: EMERGENCY MEDICINE

## 2021-03-19 PROCEDURE — U0003 INFECTIOUS AGENT DETECTION BY NUCLEIC ACID (DNA OR RNA); SEVERE ACUTE RESPIRATORY SYNDROME CORONAVIRUS 2 (SARS-COV-2) (CORONAVIRUS DISEASE [COVID-19]), AMPLIFIED PROBE TECHNIQUE, MAKING USE OF HIGH THROUGHPUT TECHNOLOGIES AS DESCRIBED BY CMS-2020-01-R: HCPCS | Performed by: INTERNAL MEDICINE

## 2021-03-19 PROCEDURE — 36415 COLL VENOUS BLD VENIPUNCTURE: CPT | Performed by: EMERGENCY MEDICINE

## 2021-03-19 PROCEDURE — U0005 INFEC AGEN DETEC AMPLI PROBE: HCPCS | Performed by: INTERNAL MEDICINE

## 2021-03-19 PROCEDURE — 99284 EMERGENCY DEPT VISIT MOD MDM: CPT

## 2021-03-19 PROCEDURE — 84484 ASSAY OF TROPONIN QUANT: CPT | Performed by: EMERGENCY MEDICINE

## 2021-03-19 PROCEDURE — 80053 COMPREHEN METABOLIC PANEL: CPT | Performed by: EMERGENCY MEDICINE

## 2021-03-19 PROCEDURE — 71045 X-RAY EXAM CHEST 1 VIEW: CPT

## 2021-03-20 LAB — SARS-COV-2 RNA RESP QL NAA+PROBE: NEGATIVE

## 2021-03-20 NOTE — DISCHARGE INSTRUCTIONS
Please call your primary care doctor to make a follow up appointment  You will receive a phone call with results of your COVID test in the next 1-2 days

## 2021-03-20 NOTE — ED PROVIDER NOTES
History  Chief Complaint   Patient presents with    Hypertension     pt states she has been sick with sore throat the past few days  c/o BP being high     HPI     [de-identified] yo F with past medical history of atrial fibrillation on xarelto, sinus sick syndrome s/p pacemaker, and hypertension presents for evaluation of hypertension  Patient is primarily [place]-speaking but is able to speak some [place] and declines  phone  Patient states starting 2 days ago, she developed myalgias in her upper back and neck  She states the following day, she developed fevers up to 100°F and occasional sweating  Patient states she has not had any more fevers or chills today  She has felt fatigued today  Patient also endorses a mild dry cough over the past few days  She states she took her blood pressure at home and it was 668 systolic so she called her son who told her to go to the emergency department  Patient denies any headaches, visual changes, chest pain, shortness of breath, abdominal pain, nausea, vomiting, diarrhea, numbness or weakness in her extremities, dysphagia, dysarthria, or ataxia  Patient was seen by her primary care doctor yesterday on a tele visit  She had a COVID test ordered which was drawn and is still pending  Prior to Admission Medications   Prescriptions Last Dose Informant Patient Reported? Taking?    COZAAR 50 MG tablet  Self No No   Sig: Take 1 tablet (50 mg total) by mouth daily   cyanocobalamin 1,000 mcg/mL  Self No No   Sig: Inject 1 mL (1,000 mcg total) into a muscle every 30 (thirty) days   fluticasone (FLONASE) 50 mcg/act nasal spray   No No   Si spray into each nostril daily   furosemide (LASIX) 20 mg tablet   No No   Sig: Take 1 tablet (20 mg total) by mouth daily   loratadine (CLARITIN) 10 mg tablet   No No   Sig: TAKE 1 TABLET (10 MG TOTAL) BY MOUTH DAILY   nebivolol (BYSTOLIC) 5 mg tablet  Self No No   Sig: Take 1 tablet (5 mg total) by mouth daily   rivaroxaban (Xarelto) 15 mg tablet  Self No No   Sig: Take 1 tablet (15 mg total) by mouth daily   rosuvastatin (CRESTOR) 5 mg tablet  Self No No   Sig: Take 1 tablet (5 mg total) by mouth daily   spironolactone (ALDACTONE) 25 mg tablet  Self No No   Sig: Take 1 tablet (25 mg total) by mouth daily   verapamil (VERELAN PM) 120 MG 24 hr capsule  Self No No   Sig: Take 1 capsule (120 mg total) by mouth daily at bedtime      Facility-Administered Medications Last Administration Doses Remaining   cyanocobalamin injection 1,000 mcg 2/17/2021  3:21 PM           Past Medical History:   Diagnosis Date    A-fib ([facility] [address] )     Anemia     Arthritis     Breast pain, right     Chest pain on breathing     Colon polyp     Cough     Diverticulosis     Encounter for screening colonoscopy     H/O sick sinus syndrome     Heart murmur     High cholesterol     History of atrial fibrillation     Rate ontrolled  On xarelto 15mg (creatinine 50) Followed by Dr Katie Cespedes with Cardiology     History of weight loss     Report loose fitting clothes  Weight stable (3lbs difference)  Last colo showed small tubular adenoma x2  Breast biopsy last showed fibrocystic changes  TSH wnl  Will check cbc, bmp, spep   Hx of long term use of blood thinners     Hypertension     Irregular heart beat     Pacemaker     Preop examination     Shortness of breath     Viral bronchitis        Past Surgical History:   Procedure Laterality Date    BREAST BIOPSY Right 08/17/2006    ultrasound guided Percutaneous Needle Core -Benign    CATARACT EXTRACTION W/ INTRAOCULAR LENS  IMPLANT, BILATERAL      COLONOSCOPY      COLONOSCOPY N/A 5/22/2018    Procedure: COLONOSCOPY;  Surgeon: Romana Barth, DO;  [facility];   Service: Gastroenterology    INSERT / Ruma Ora / Delle Prudent Right     as a child after a fall    MAMMO STEREOTACTIC BREAST BIOPSY RIGHT (ALL INC) Right 10/2014    benign    [place] CMBND ANTERPOST COLPORRAPHY W/CYSTO N/A 3/17/2016 Procedure: COLPORRHAPHY ANTERIOR POSTERIOR ;  Surgeon: Vikki Haines MD;  [facility];  Service: Gynecology    [place] COLONOSCOPY FLX DX W/COLLJ [facility] WHEN PFRMD N/A 4/4/2019    Procedure: COLONOSCOPY;  Surgeon: Andre Rosen DO;  [facility]; Service: Gastroenterology    AZ CYSTOURETHROSCOPY N/A 3/17/2016    Procedure: CYSTOSCOPY;  Surgeon: Vikki Haines MD;  [facility];  Service: Gynecology    AZ ESOPHAGOGASTRODUODENOSCOPY TRANSORAL DIAGNOSTIC N/A 4/16/2018    Procedure: EGD AND COLONOSCOPY;  Surgeon: Andre Rosen DO;  [facility]; Service: Gastroenterology    AZ REVAGINAL PROLAPSE,SACROSP LIG N/A 3/17/2016    Procedure: COLPOPEXY VAGINAL EXTRAPERITONEAL (VEC) ANTERIOR ;  Surgeon: Vikki Haines MD;  [facility];  Service: Gynecology    AZ SLING OPER STRES INCONTINENCE N/A 3/17/2016    Procedure: INSERTION PUBOVAGINAL SLING SINGLE INCISION ;  Surgeon: Vikki Haines MD;  [facility];  Service: Gynecology       Family History   Problem Relation Age of Onset    Diabetes Mother     Breast cancer Sister 48    No Known Problems Father     No Known Problems Maternal Grandmother     No Known Problems Maternal Grandfather     No Known Problems Paternal Grandmother     No Known Problems Paternal Grandfather     No Known Problems Daughter     No Known Problems Son     No Known Problems Sister     No Known Problems Sister     No Known Problems Brother     No Known Problems Brother     No Known Problems Sister     No Known Problems Paternal Aunt     No Known Problems Paternal Aunt     No Known Problems Paternal Aunt     No Known Problems Paternal Aunt      I have reviewed and agree with the history as documented      E-Cigarette/Vaping    E-Cigarette Use Never User      E-Cigarette/Vaping Substances    Nicotine No     THC No     CBD No     Flavoring No     Other No     Unknown No      Social History Tobacco Use    Smoking status: Never Smoker    Smokeless tobacco: Never Used   Substance Use Topics    Alcohol use: No    Drug use: No        Review of Systems   Constitutional: Positive for fatigue and fever  Negative for appetite change, chills and unexpected weight change  HENT: Negative for congestion, rhinorrhea and sore throat  Eyes: Negative for visual disturbance  Respiratory: Positive for cough  Negative for chest tightness, shortness of breath and wheezing  Cardiovascular: Negative for chest pain, palpitations and leg swelling  Gastrointestinal: Negative for abdominal distention, abdominal pain, constipation, diarrhea, nausea and vomiting  Genitourinary: Negative for dysuria, frequency, hematuria and urgency  Musculoskeletal: Positive for myalgias  Negative for arthralgias  Skin: Negative for color change, pallor and rash  Neurological: Negative for dizziness, weakness, light-headedness, numbness and headaches  All other systems reviewed and are negative  Physical Exam  ED Triage Vitals   Temperature Pulse Respirations Blood Pressure SpO2   03/19/21 2018 03/19/21 2018 03/19/21 2018 03/19/21 2018 03/19/21 2051   98 2 °F (36 8 °C) 67 18 (!) 198/112 99 %      Temp Source Heart Rate Source Patient Position - Orthostatic VS BP Location FiO2 (%)   03/19/21 2018 03/19/21 2018 03/19/21 2018 03/19/21 2018 --   Tympanic Monitor Sitting Right arm       Pain Score       03/19/21 2051       No Pain             Orthostatic Vital Signs  Vitals:    03/19/21 2018 03/19/21 2051 03/19/21 2134   BP: (!) 198/112 (!) 208/88 (!) 183/79   Pulse: 67 78 83   Patient Position - Orthostatic VS: Sitting Lying Lying       Physical Exam  Vitals signs and nursing note reviewed  Constitutional:       General: She is not in acute distress  Appearance: Normal appearance  She is well-developed and normal weight  She is not ill-appearing, toxic-appearing or diaphoretic     HENT:      Head: Normocephalic and atraumatic  Mouth/Throat:      Mouth: Mucous membranes are moist       Pharynx: Oropharynx is clear  Eyes:      General: No visual field deficit  Extraocular Movements: Extraocular movements intact  Conjunctiva/sclera: Conjunctivae normal       Pupils: Pupils are equal, round, and reactive to light  Neck:      Musculoskeletal: Neck supple  Cardiovascular:      Rate and Rhythm: Normal rate and regular rhythm  Pulses: Normal pulses  Heart sounds: Normal heart sounds  No murmur  No friction rub  No gallop  Pulmonary:      Effort: Pulmonary effort is normal  No respiratory distress  Breath sounds: Normal breath sounds  No wheezing or rales  Abdominal:      General: There is no distension  Palpations: Abdomen is soft  Tenderness: There is no abdominal tenderness  There is no guarding or rebound  Musculoskeletal:         General: No swelling or tenderness  Right lower leg: No edema  Left lower leg: No edema  Skin:     General: Skin is warm and dry  Coloration: Skin is not pale  Findings: No erythema or rash  Neurological:      General: No focal deficit present  Mental Status: She is alert and oriented to person, place, and time  Cranial Nerves: Cranial nerves are intact  No cranial nerve deficit, dysarthria or facial asymmetry  Sensory: Sensation is intact  No sensory deficit  Motor: Motor function is intact  No weakness or pronator drift  Coordination: Coordination is intact  Gait: Gait is intact     Psychiatric:         Mood and Affect: Mood normal          Behavior: Behavior normal          ED Medications  Medications - No data to display    Diagnostic Studies  Results Reviewed     Procedure Component Value Units Date/Time    Urine Microscopic [[id number]]  (Abnormal) Collected: 03/19/21 2307    Lab Status: Final result Specimen: Urine, Other Updated: 03/19/21 2331     RBC, UA None Seen /hpf      WBC, UA 4-10 /hpf      Epithelial Cells None Seen /hpf      Bacteria, UA None Seen /hpf      Hyaline Casts, UA None Seen /lpf     POCT urinalysis dipstick [[id number]]  (Normal) Resulted: 03/19/21 2309    Lab Status: Final result Specimen: Urine Updated: 03/19/21 2311     Color, UA SEE RESULTS    Urine Macroscopic, POC [[id number]]  (Abnormal) Collected: 03/19/21 2307    Lab Status: Final result Specimen: Urine Updated: 03/19/21 2309     Color, UA Yellow     Clarity, UA Clear     pH, UA 7 0     Leukocytes, UA Trace     Nitrite, UA Negative     Protein, UA Negative mg/dl      Glucose, UA Negative mg/dl      Ketones, UA Negative mg/dl      Urobilinogen, UA 4 0 E U /dl      Bilirubin, UA Negative     Blood, UA Trace     Specific [place], UA 1 025    Narrative:      CLINITEK RESULT    Comprehensive metabolic panel [[id number]]  (Abnormal) Collected: 03/19/21 2133    Lab Status: Final result Specimen: Blood from Arm, Left Updated: 03/19/21 2211     Sodium 140 mmol/L      Potassium 3 7 mmol/L      Chloride 109 mmol/L      CO2 25 mmol/L      ANION GAP 6 mmol/L      BUN 18 mg/dL      Creatinine 0 95 mg/dL      Glucose 80 mg/dL      Calcium 9 3 mg/dL      Corrected Calcium 9 9 mg/dL      AST 17 U/L      ALT 22 U/L      Alkaline Phosphatase 88 U/L      Total Protein 7 5 g/dL      Albumin 3 2 g/dL      Total Bilirubin 0 58 mg/dL      eGFR 57 ml/min/1 73sq m     Narrative:      [place] guidelines for Chronic Kidney Disease (CKD):     Stage 1 with normal or high GFR (GFR > 90 mL/min/1 73 square meters)    Stage 2 Mild CKD (GFR = 60-89 mL/min/1 73 square meters)    Stage 3A Moderate CKD (GFR = 45-59 mL/min/1 73 square meters)    Stage 3B Moderate CKD (GFR = 30-44 mL/min/1 73 square meters)    Stage 4 Severe CKD (GFR = 15-29 mL/min/1 73 square meters)    Stage 5 End Stage CKD (GFR <15 mL/min/1 73 square meters)  Note: GFR calculation is accurate only with a steady state creatinine    TSH, 3rd generation with Free T4 reflex [[id number]]  (Normal) Collected: 03/19/21 2133    Lab Status: Final result Specimen: Blood from Arm, Left Updated: 03/19/21 2211     TSH 3RD GENERATON 2 020 uIU/mL     Narrative:      Patients undergoing fluorescein dye angiography may retain small amounts of fluorescein in the body for 48-72 hours post procedure  Samples containing fluorescein can produce falsely depressed TSH values  If the patient had this procedure,a specimen should be resubmitted post fluorescein clearance  Troponin I [[id number]]  (Normal) Collected: 03/19/21 2133    Lab Status: Final result Specimen: Blood from Arm, Left Updated: 03/19/21 2204     Troponin I <0 02 ng/mL     CBC and differential [[id number]] Collected: 03/19/21 2133    Lab Status: Final result Specimen: Blood from Arm, Left Updated: 03/19/21 2145     WBC 7 51 Thousand/uL      RBC 3 86 Million/uL      Hemoglobin 11 8 g/dL      Hematocrit 36 6 %      MCV 95 fL      MCH 30 6 pg      MCHC 32 2 g/dL      RDW 12 8 %      MPV 10 2 fL      Platelets 375 Thousands/uL      nRBC 0 /100 WBCs      Neutrophils Relative 73 %      Immat GRANS % 0 %      Lymphocytes Relative 18 %      Monocytes Relative 7 %      Eosinophils Relative 2 %      Basophils Relative 0 %      Neutrophils Absolute 5 38 Thousands/µL      Immature Grans Absolute 0 02 Thousand/uL      Lymphocytes Absolute 1 37 Thousands/µL      Monocytes Absolute 0 53 Thousand/µL      Eosinophils Absolute 0 18 Thousand/µL      Basophils Absolute 0 03 Thousands/µL                  XR chest 1 view portable   ED Interpretation by Celestine Griffith MD (03/19 2319)   Atelectasis bilateral lower lobes  Final Result by Keaynna Dwyer MD (03/20 1158)      Bibasilar discoid atelectasis                    Workstation performed: [id number]               Procedures  Procedures      ED Course                                       MDM     [de-identified] yo F with past medical history of atrial fibrillation on xarelto, sinus sick syndrome s/p pacemaker, and hypertension presents for evaluation of hypertension and fatigue  Differential diagnosis includes:  COVID, anemia, electrolyte abnormality, thyroid abnormality  Will evaluate with CBC, CMP, troponin, TSH, EKG and CXR  Patient already has outpatient COVID test pending  Reviewed labs and imaging and EKG, no marked abnormalities  Discussed with patient, she is feeling slightly better, BP improved to 814 systolic  Discussed that patient should call her PCP to make follow up appointment  Discussed strict return precautions  Patient expressed understanding and was agreeable for discharge  Disposition  Final diagnoses:   Hypertension   Fatigue     Time reflects when diagnosis was documented in both MDM as applicable and the Disposition within this note     Time User Action Codes Description Comment    3/19/2021 11:30 PM Paul Almonte [I10] Hypertension     3/19/2021 11:30 PM Paul Edmondson Add [R53 83] Fatigue       ED Disposition     ED Disposition Condition Date/Time Comment    Discharge Stable Fri Mar 19, 2021 11:30 PM Kathy Bright discharge to home/self care              Follow-up Information     Follow up With Specialties Details Why Contact Info    [employer]  Schedule an appointment as soon as possible for a visit   [phone]            Discharge Medication List as of 3/19/2021 11:37 PM      CONTINUE these medications which have NOT CHANGED    Details   COZAAR 50 MG tablet Take 1 tablet (50 mg total) by mouth daily, Starting Tue 7/7/2020, Normal      cyanocobalamin 1,000 mcg/mL Inject 1 mL (1,000 mcg total) into a muscle every 30 (thirty) days, Starting Wed 11/4/2020, Normal      fluticasone (FLONASE) 50 mcg/act nasal spray 1 spray into each nostril daily, Starting Thu 3/18/2021, Normal      furosemide (LASIX) 20 mg tablet Take 1 tablet (20 mg total) by mouth daily, Starting Wed 11/25/2020, Normal      loratadine (CLARITIN) 10 mg tablet TAKE 1 TABLET (10 MG TOTAL) BY MOUTH DAILY, Starting Mon 1/18/2021, Normal      nebivolol (BYSTOLIC) 5 mg tablet Take 1 tablet (5 mg total) by mouth daily, Starting Wed 7/15/2020, Normal      rivaroxaban (Xarelto) 15 mg tablet Take 1 tablet (15 mg total) by mouth daily, Starting Wed 11/4/2020, Normal      rosuvastatin (CRESTOR) 5 mg tablet Take 1 tablet (5 mg total) by mouth daily, Starting Wed 11/4/2020, Normal      spironolactone (ALDACTONE) 25 mg tablet Take 1 tablet (25 mg total) by mouth daily, Starting Tue 7/7/2020, Normal      verapamil (VERELAN PM) 120 MG 24 hr capsule Take 1 capsule (120 mg total) by mouth daily at bedtime, Starting Mon 8/10/2020, Normal           No discharge procedures on file  PDMP Review     None           ED Provider  Attending physically available and evaluated Kristian Gonzalez I managed the patient along with the ED Attending      Electronically Signed by         Melania Pickering MD  03/20/21 9628

## 2021-03-22 LAB
ATRIAL RATE: 75 BPM
QRS AXIS: 256 DEGREES
QRSD INTERVAL: 134 MS
QT INTERVAL: 418 MS
QTC INTERVAL: 463 MS
T WAVE AXIS: 108 DEGREES
VENTRICULAR RATE: 74 BPM

## 2021-03-22 PROCEDURE — 93010 ELECTROCARDIOGRAM REPORT: CPT | Performed by: INTERNAL MEDICINE

## 2021-03-22 NOTE — ED ATTENDING ATTESTATION
3/19/2021  IBipin MD, saw and evaluated the patient  I have discussed the patient with the resident/non-physician practitioner and agree with the resident's/non-physician practitioner's findings, Plan of Care, and MDM as documented in the resident's/non-physician practitioner's note, except where noted  All available labs and Radiology studies were reviewed  I was present for key portions of any procedure(s) performed by the resident/non-physician practitioner and I was immediately available to provide assistance  At this point I agree with the current assessment done in the Emergency Department  I have conducted an independent evaluation of this patient a history and physical is as follows:    ED Course     [de-identified] female, past history of atrial fibrillation on Xarelto, sick sinus syndrome, status post pacemaker placement, history of hypertension, presenting to the emergency department for evaluation of low-grade temperature elevation as well as hypertension  Patient states that 2 days ago she had the gradual onset of myalgias in her upper back and neck, this has progressed to be global throughout her body today  The patient states that she has been increasingly fatigued and states that she developed fever" with a temperature of 100° F  Patient reports mild dry cough  Patient is concerned because she took her blood pressure at home and had a systolic blood pressure of 315 systolic  Patient denies any abdominal pain, nausea, vomiting, diarrhea  There is no chest pain  There is no shortness of breath  Ten systems reviewed negative except as noted in the history of present illness  The patient is resting comfortably on a stretcher in no acute respiratory distress  The patient appears nontoxic  HEENT reveals dry mucous membranes  Head is normocephalic and atraumatic   Conjunctiva and sclera are normal  Neck is nontender and supple with full range of motion to flexion, extension, lateral rotation  No meningismus appreciated  No masses are appreciated  Lungs are clear to auscultation bilaterally without any wheezes, rales or rhonchi  Heart is regular rate and rhythm without any murmurs, rubs or gallops  Abdomen is soft and nontender without any rebound or guarding  Extremities appear grossly normal without any significant arthropathy  No lower extremity edema  No calf tenderness  Negative Homans sign bilaterally  Patient is awake, alert, and oriented x3  The patient has normal interaction  Motor is 5 out of 11    80-year-old female, clinically looking well, with mild hypertension as well as mild fever with myalgias  This has no obvious source  Labs Reviewed   COMPREHENSIVE METABOLIC PANEL - Abnormal       Result Value Ref Range Status    Sodium 140  136 - 145 mmol/L Final    Potassium 3 7  3 5 - 5 3 mmol/L Final    Chloride 109 (*) 100 - 108 mmol/L Final    CO2 25  21 - 32 mmol/L Final    ANION GAP 6  4 - 13 mmol/L Final    BUN 18  5 - 25 mg/dL Final    Creatinine 0 95  0 60 - 1 30 mg/dL Final    Comment: Standardized to IDMS reference method    Glucose 80  65 - 140 mg/dL Final    Comment: If the patient is fasting, the ADA then defines impaired fasting glucose as > 100 mg/dL and diabetes as > or equal to 123 mg/dL  Specimen collection should occur prior to Sulfasalazine administration due to the potential for falsely depressed results  Specimen collection should occur prior to Sulfapyridine administration due to the potential for falsely elevated results  Calcium 9 3  8 3 - 10 1 mg/dL Final    Corrected Calcium 9 9  8 3 - 10 1 mg/dL Final    AST 17  5 - 45 U/L Final    Comment: Specimen collection should occur prior to Sulfasalazine administration due to the potential for falsely depressed results       ALT 22  12 - 78 U/L Final    Comment: Specimen collection should occur prior to Sulfasalazine and/or Sulfapyridine administration due to the potential for falsely depressed results  Alkaline Phosphatase 88  46 - 116 U/L Final    Total Protein 7 5  6 4 - 8 2 g/dL Final    Albumin 3 2 (*) 3 5 - 5 0 g/dL Final    Total Bilirubin 0 58  0 20 - 1 00 mg/dL Final    Comment: Use of this assay is not recommended for patients undergoing treatment with eltrombopag due to the potential for falsely elevated results      eGFR 57  ml/min/1 73sq m Final    Narrative:     National Kidney Disease Foundation guidelines for Chronic Kidney Disease (CKD):     Stage 1 with normal or high GFR (GFR > 90 mL/min/1 73 square meters)    Stage 2 Mild CKD (GFR = 60-89 mL/min/1 73 square meters)    Stage 3A Moderate CKD (GFR = 45-59 mL/min/1 73 square meters)    Stage 3B Moderate CKD (GFR = 30-44 mL/min/1 73 square meters)    Stage 4 Severe CKD (GFR = 15-29 mL/min/1 73 square meters)    Stage 5 End Stage CKD (GFR <15 mL/min/1 73 square meters)  Note: GFR calculation is accurate only with a steady state creatinine   URINE MICROSCOPIC - Abnormal    RBC, UA None Seen  None Seen, 2-4 /hpf Final    WBC, UA 4-10 (*) None Seen, 2-4 /hpf Final    Epithelial Cells None Seen  None Seen, Occasional /hpf Final    Bacteria, UA None Seen  None Seen, Occasional /hpf Final    Hyaline Casts, UA None Seen  None Seen /lpf Final   URINE MACROSCOPIC, POC - Abnormal    Color, UA Yellow   Final    Clarity, UA Clear   Final    pH, UA 7 0  4 5 - 8 0 Final    Leukocytes, UA Trace (*) Negative Final    Nitrite, UA Negative  Negative Final    Protein, UA Negative  Negative mg/dl Final    Glucose, UA Negative  Negative mg/dl Final    Ketones, UA Negative  Negative mg/dl Final    Urobilinogen, UA 4 0 (*) 0 2, 1 0 E U /dl E U /dl Final    Bilirubin, UA Negative  Negative Final    Blood, UA Trace (*) Negative Final    Specific Gravity, UA 1 025  1 003 - 1 030 Final    Narrative:     CLINITEK RESULT   TROPONIN I - Normal    Troponin I <0 02  <=0 04 ng/mL Final    Comment: Siemens Chemistry analyzer 99% cutoff is > 0 04 ng/mL in network labs o cTnI 99% cutoff is useful only when applied to patients in the clinical setting of myocardial ischemia   o cTnI 99% cutoff should be interpreted in the context of clinical history, ECG findings and possibly cardiac imaging to establish correct diagnosis  o cTnI 99% cutoff may be suggestive but clearly not indicative of a coronary event without the clinical setting of myocardial ischemia  TSH, 3RD GENERATION WITH FREE T4 REFLEX - Normal    TSH 3RD GENERATON 2 020  0 358 - 3 740 uIU/mL Final    Comment: The recommended reference ranges for TSH during pregnancy are as follows:   First trimester 0 1 to 2 5 uIU/mL   Second trimester  0 2 to 3 0 uIU/mL   Third trimester 0 3 to 3 0 uIU/m    Note: Normal ranges may not apply to patients who are transgender, non-binary, or whose legal sex, sex at birth, and gender identity differ  Narrative:     Patients undergoing fluorescein dye angiography may retain small amounts of fluorescein in the body for 48-72 hours post procedure  Samples containing fluorescein can produce falsely depressed TSH values  If the patient had this procedure,a specimen should be resubmitted post fluorescein clearance       POCT URINALYSIS DIPSTICK - Normal    Color, UA SEE RESULTS   Final   CBC AND DIFFERENTIAL    WBC 7 51  4 31 - 10 16 Thousand/uL Final    RBC 3 86  3 81 - 5 12 Million/uL Final    Hemoglobin 11 8  11 5 - 15 4 g/dL Final    Hematocrit 36 6  34 8 - 46 1 % Final    MCV 95  82 - 98 fL Final    MCH 30 6  26 8 - 34 3 pg Final    MCHC 32 2  31 4 - 37 4 g/dL Final    RDW 12 8  11 6 - 15 1 % Final    MPV 10 2  8 9 - 12 7 fL Final    Platelets 503  164 - 390 Thousands/uL Final    nRBC 0  /100 WBCs Final    Neutrophils Relative 73  43 - 75 % Final    Immat GRANS % 0  0 - 2 % Final    Lymphocytes Relative 18  14 - 44 % Final    Monocytes Relative 7  4 - 12 % Final    Eosinophils Relative 2  0 - 6 % Final    Basophils Relative 0  0 - 1 % Final    Neutrophils Absolute 5 38  1 85 - 7 62 Thousands/µL Final    Immature Grans Absolute 0 02  0 00 - 0 20 Thousand/uL Final    Lymphocytes Absolute 1 37  0 60 - 4 47 Thousands/µL Final    Monocytes Absolute 0 53  0 17 - 1 22 Thousand/µL Final    Eosinophils Absolute 0 18  0 00 - 0 61 Thousand/µL Final    Basophils Absolute 0 03  0 00 - 0 10 Thousands/µL Final       XR chest 1 view portable   ED Interpretation   Atelectasis bilateral lower lobes  Final Result      Bibasilar discoid atelectasis  Workstation performed: [id number]             Atelectasis on chest x-ray  No identifiable source of fever  Likely viral illness            Critical Care Time  Procedures

## 2021-03-23 ENCOUNTER — TELEPHONE (OUTPATIENT)
Dept: INTERNAL MEDICINE CLINIC | Facility: CLINIC | Age: 81
End: 2021-03-23

## 2021-03-23 ENCOUNTER — TELEPHONE (OUTPATIENT)
Dept: CARDIOLOGY CLINIC | Facility: CLINIC | Age: 81
End: 2021-03-23

## 2021-03-23 ENCOUNTER — OFFICE VISIT (OUTPATIENT)
Dept: INTERNAL MEDICINE CLINIC | Facility: CLINIC | Age: 81
End: 2021-03-23

## 2021-03-23 VITALS
HEART RATE: 85 BPM | SYSTOLIC BLOOD PRESSURE: 175 MMHG | WEIGHT: 136 LBS | BODY MASS INDEX: 24.09 KG/M2 | DIASTOLIC BLOOD PRESSURE: 64 MMHG | TEMPERATURE: 98.1 F

## 2021-03-23 DIAGNOSIS — E53.8 B12 DEFICIENCY: Chronic | ICD-10-CM

## 2021-03-23 DIAGNOSIS — E53.8 B12 DEFICIENCY: Primary | Chronic | ICD-10-CM

## 2021-03-23 DIAGNOSIS — I10 HYPERTENSION, UNSPECIFIED TYPE: ICD-10-CM

## 2021-03-23 DIAGNOSIS — I10 ESSENTIAL HYPERTENSION: Primary | Chronic | ICD-10-CM

## 2021-03-23 PROCEDURE — 99213 OFFICE O/P EST LOW 20 MIN: CPT | Performed by: INTERNAL MEDICINE

## 2021-03-23 PROCEDURE — 96372 THER/PROPH/DIAG INJ SC/IM: CPT | Performed by: INTERNAL MEDICINE

## 2021-03-23 PROCEDURE — 1160F RVW MEDS BY RX/DR IN RCRD: CPT | Performed by: INTERNAL MEDICINE

## 2021-03-23 PROCEDURE — 1101F PT FALLS ASSESS-DOCD LE1/YR: CPT | Performed by: INTERNAL MEDICINE

## 2021-03-23 PROCEDURE — 3288F FALL RISK ASSESSMENT DOCD: CPT | Performed by: INTERNAL MEDICINE

## 2021-03-23 PROCEDURE — 1036F TOBACCO NON-USER: CPT | Performed by: INTERNAL MEDICINE

## 2021-03-23 RX ORDER — NEBIVOLOL 5 MG/1
5 TABLET ORAL DAILY
Qty: 90 TABLET | Refills: 3 | Status: SHIPPED | OUTPATIENT
Start: 2021-03-23 | End: 2021-08-10 | Stop reason: SDUPTHER

## 2021-03-23 RX ORDER — LOSARTAN POTASSIUM 100 MG/1
100 TABLET ORAL DAILY
Qty: 90 TABLET | Refills: 0 | Status: SHIPPED | OUTPATIENT
Start: 2021-03-23

## 2021-03-23 RX ADMIN — CYANOCOBALAMIN 1000 MCG: 1000 INJECTION INTRAMUSCULAR; SUBCUTANEOUS at 10:07

## 2021-03-23 NOTE — TELEPHONE ENCOUNTER
Patient here in office today for provider visit  Patient asked to speak with me because she is interested in having someone help in her home with cleaning and getting around to appointments because she becomes fatigued very easily

## 2021-03-23 NOTE — PROGRESS NOTES
[address]  INTERNAL MEDICINE  [address] [facility] [address] [place] [address]      NAME: Hailey Guadalupe  AGE: [de-identified] y o  SEX: female    DATE OF ENCOUNTER: 3/23/2021    ASSESSMENT AND PLAN     1  Essential hypertension  · Patient is here today to follow up on a 1 week history of elevated blood pressure  · Her blood pressure has been ranging between 737-395 systolic since a week  · She is on 5 anti-hypertensives and is 100% compliant- she takes verapamil 120 mg, spironolactone 25mg, lasix 20mg, losartan 50mg and nebivolol 5mg  When I made patient stand and re-checked her blood pressure it was 140/60  · We ideally donot want her diastolic BP <87  · I have asked her to double the dose of losartan and check her BP at home regularly  - losartan (COZAAR) 100 MG tablet; Take 1 tablet (100 mg total) by mouth daily  Dispense: 90 tablet; Refill: 0    2  B12 deficiency  Patient will get her B12 injection at this visit       No orders of the defined types were placed in this encounter  CHIEF COMPLAINT     Chief Complaint   Patient presents with    Follow-up       HISTORY OF PRESENT ILLNESS     Patient here for follow-up of elevated blood pressure  She is exercising and is adherent to a low-salt diet  Blood pressure is not well controlled at home  Cardiac symptoms: none  Patient denies: chest pain, chest pressure/discomfort, claudication, dyspnea, exertional chest pressure/discomfort, fatigue, lower extremity edema, near-syncope, orthopnea, palpitations, paroxysmal nocturnal dyspnea, syncope and tachypnea  Use of agents associated with hypertension: none  History of target organ damage: none        The following portions of the patient's history were reviewed and updated as appropriate: allergies, current medications, past family history, past medical history, past social history, past surgical history and problem list     REVIEW OF SYSTEMS     Review of Systems   All other systems reviewed and are negative  All other ROS negative except per HPI    ACTIVE PROBLEM LIST     Patient Active Problem List   Diagnosis    Essential hypertension    Hyperlipidemia LDL goal <100    Stage 3 chronic kidney disease    Osteoarthritis of both wrists    Chronic atrial fibrillation (HCC)    Tubulovillous adenoma    Gastroesophageal reflux disease without esophagitis    Presence of cardiac pacemaker    B12 deficiency    Allergic rhinitis due to pollen    Cavus deformity of foot    Midline cystocele    Hallux abducto valgus, bilateral    Left renal artery stenosis (HCC)    Osteoarthritis of hip    Osteopenia    Pain due to onychomycosis of toenails of both feet    Left atrial enlargement    Lipoma of right upper extremity    Diverticulosis of colon       Past Medical History:   Diagnosis Date    A-fib ([facility] [address] )     Anemia     Arthritis     Breast pain, right     Chest pain on breathing     Colon polyp     Cough     Diverticulosis     Encounter for screening colonoscopy     H/O sick sinus syndrome     Heart murmur     High cholesterol     History of atrial fibrillation     Rate ontrolled  On xarelto 15mg (creatinine 50) Followed by Dr Silver Pallas with Cardiology     History of weight loss     Report loose fitting clothes  Weight stable (3lbs difference)  Last colo showed small tubular adenoma x2  Breast biopsy last showed fibrocystic changes  TSH wnl  Will check cbc, bmp, spep          Hx of long term use of blood thinners     Hypertension     Irregular heart beat     Pacemaker     Preop examination     Shortness of breath     Viral bronchitis        CURRENT MEDICATIONS       Current Outpatient Medications:     cyanocobalamin 1,000 mcg/mL, Inject 1 mL (1,000 mcg total) into a muscle every 30 (thirty) days, Disp: 3 mL, Rfl: 3    furosemide (LASIX) 20 mg tablet, Take 1 tablet (20 mg total) by mouth daily, Disp: 90 tablet, Rfl: 3    loratadine (CLARITIN) 10 mg tablet, TAKE 1 TABLET (10 MG TOTAL) BY MOUTH DAILY, Disp: 30 tablet, Rfl: 2    nebivolol (BYSTOLIC) 5 mg tablet, Take 1 tablet (5 mg total) by mouth daily, Disp: 90 tablet, Rfl: 3    rivaroxaban (Xarelto) 15 mg tablet, Take 1 tablet (15 mg total) by mouth daily, Disp: 90 tablet, Rfl: 3    rosuvastatin (CRESTOR) 5 mg tablet, Take 1 tablet (5 mg total) by mouth daily, Disp: 90 tablet, Rfl: 3    spironolactone (ALDACTONE) 25 mg tablet, Take 1 tablet (25 mg total) by mouth daily, Disp: 90 tablet, Rfl: 3    verapamil (VERELAN PM) 120 MG 24 hr capsule, Take 1 capsule (120 mg total) by mouth daily at bedtime, Disp: 90 capsule, Rfl: 3    fluticasone (FLONASE) 50 mcg/act nasal spray, 1 spray into each nostril daily (Patient not taking: Reported on 3/23/2021), Disp: 1 Bottle, Rfl: 1    losartan (COZAAR) 100 MG tablet, Take 1 tablet (100 mg total) by mouth daily, Disp: 90 tablet, Rfl: 0    Current Facility-Administered Medications:     cyanocobalamin injection 1,000 mcg, 1,000 mcg, Intramuscular, Q30 Days, Estela Cutler MD, 1,000 mcg at 03/23/21 1007    Allergies   Allergen Reactions    Other Itching     Bandaids    Tape [Medical Tape] Itching       Social History   Past Surgical History:   Procedure Laterality Date    BREAST BIOPSY Right 08/17/2006    ultrasound guided Percutaneous Needle Core -Benign    CATARACT EXTRACTION W/ INTRAOCULAR LENS  IMPLANT, BILATERAL      COLONOSCOPY      COLONOSCOPY N/A 5/22/2018    Procedure: COLONOSCOPY;  Surgeon: Teresa Romero DO;  [facility];   Service: Gastroenterology    Ariana Campos / Genaro Acosta / Alejandra Trujillo Right     as a child after a fall    MAMMO STEREOTACTIC BREAST BIOPSY RIGHT (ALL INC) Right 10/2014    benign    [place] CMBND ANTERPOST COLPORRAPHY W/CYSTO N/A 3/17/2016    Procedure: COLPORRHAPHY ANTERIOR POSTERIOR ;  Surgeon: India Teague MD;  [facility];  Service: Gynecology    [place] COLONOSCOPY FLX DX W/COLLJ SPEC WHEN PFRMD N/A 4/4/2019    Procedure: COLONOSCOPY;  Surgeon: Everardo Chaudhary DO;  [facility]; Service: Gastroenterology    ME CYSTOURETHROSCOPY N/A 3/17/2016    Procedure: CYSTOSCOPY;  Surgeon: Peña Mendoza MD;  [facility];  Service: Gynecology    ME ESOPHAGOGASTRODUODENOSCOPY TRANSORAL DIAGNOSTIC N/A 4/16/2018    Procedure: EGD AND COLONOSCOPY;  Surgeon: Everardo Chaudhary DO;  [facility];   Service: Gastroenterology    ME REVAGINAL PROLAPSE,SACROSP LIG N/A 3/17/2016    Procedure: COLPOPEXY VAGINAL EXTRAPERITONEAL (VEC) ANTERIOR ;  Surgeon: Peña Mendoza MD;  [facility];  Service: Gynecology    ME SLING OPER STRES INCONTINENCE N/A 3/17/2016    Procedure: INSERTION PUBOVAGINAL SLING SINGLE INCISION ;  Surgeon: Peña Mendoza MD;  [facility];  Service: Gynecology     Family History   Problem Relation Age of Onset    Diabetes Mother     Breast cancer Sister 48    No Known Problems Father     No Known Problems Maternal Grandmother     No Known Problems Maternal Grandfather     No Known Problems Paternal Grandmother     No Known Problems Paternal Grandfather     No Known Problems Daughter     No Known Problems Son     No Known Problems Sister     No Known Problems Sister     No Known Problems Brother     No Known Problems Brother     No Known Problems Sister     No Known Problems Paternal Aunt     No Known Problems Paternal Aunt     No Known Problems Paternal Aunt     No Known Problems Paternal Aunt        OBJECTIVE     BP (!) 175/64 (BP Location: Left arm, Patient Position: Sitting, Cuff Size: Standard)   Pulse 85   Temp 98 1 °F (36 7 °C) (Temporal)   Wt 61 7 kg (136 lb)   BMI 24 09 kg/m²     Physical Exam:   GENERAL: NAD  HEENT:  NC/AT, PERRL, EOMI, MMM, no scleral icterus  CARDIAC:  RRR, +S1/S2, no S3/S4 heard, no m/g/r  PULMONARY:  CTA B/L, no wheezing/rales/rhonci, non-labored breathing  ABDOMEN:  Soft, NT/ND, +BS, no rebound/guarding/rigidity  Extremities:  2+ Pulses in DP/PT  No edema, cyanosis, or clubbing  NEUROLOGIC:  Alert/oriented x3  No motor or sensory deficits  SKIN:  No rashes or erythema      Pertinent Laboratory/Diagnostic Studies:     Xr Chest 1 View Portable    Result Date: 3/20/2021  Impression: Bibasilar discoid atelectasis   Workstation performed: [id number]       Images and diagnostics reviewed     Sandrita [address] Maintenance   Topic Date Due    Medicare Annual Wellness Visit (AWV)  Never done    COVID-19 Vaccine (2 - Moderna 2-dose series) 03/19/2021    Colonoscopy Surveillance  06/15/2021    Depression Screening PHQ  08/10/2021    Fall Risk  03/23/2022    BMI: Adult  03/23/2022    DTaP,Tdap,and Td Vaccines (2 - Td) 03/12/2024    Pneumococcal Vaccine: 65+ Years  Completed    Influenza Vaccine  Completed    HIB Vaccine  Aged Out    Hepatitis B Vaccine  Aged Out    IPV Vaccine  Aged Out    Hepatitis A Vaccine  Aged Out    Meningococcal ACWY Vaccine  Aged Out    HPV Vaccine  Aged Out     Immunization History   Administered Date(s) Administered    INFLUENZA 10/11/2005, 10/24/2006, 10/15/2007, 09/28/2015, 10/06/2016, 10/18/2017    Influenza Quadrivalent Preservative Free 3 years and older IM 10/06/2016    Influenza Quadrivalent, 6-35 Months IM 10/18/2017    Influenza, high dose seasonal 0 7 mL 10/12/2018, 09/23/2019, 09/23/2020    Influenza, seasonal, injectable 09/24/2013, 09/24/2014, 09/28/2015    Pneumococcal Conjugate 13-Valent 05/31/2016    Pneumococcal Polysaccharide PPV23 01/01/2001, 12/18/2019    Tdap 03/12/2014         Richard Negerte MD  PGY-3  Internal Medicine

## 2021-03-23 NOTE — TELEPHONE ENCOUNTER
Patient of Tye Juares was in the hospital recently for hypertension  Patient called today stating she has not taken the new medication they gave her because it is not the brand  The medication they wanted her to start is Losartan (Cozaar) 100mg  Patient also wanted to check with you if it is ok to take this medication  Patient asked if she can take flonase? Patient states she is exteremly fatigued for the pass month   Below is a list of her blood pressure  208/88   189/98  175/95   158/80   Please advise, thank you

## 2021-03-24 ENCOUNTER — PATIENT OUTREACH (OUTPATIENT)
Dept: INTERNAL MEDICINE CLINIC | Facility: CLINIC | Age: 81
End: 2021-03-24

## 2021-03-24 DIAGNOSIS — Z74.8 ASSISTANCE NEEDED WITH TRANSPORTATION: Primary | ICD-10-CM

## 2021-03-24 NOTE — PROGRESS NOTES
[facility] had notes Osie Pac [phone] had received an in basket referral from Anirudh Valdez RN in regards to the [place] Waiver Program for this patient  SWCM had called the patient via phone  SWCM spoke in the preferred language, Y[place]ut  SWCM had asked the patient what is going on that she needs additional support  Patient stated she has been feeling tired and unable to walk for too long without getting restless  Patient stated she is unable to go up the stairs  Patient stated her arms hurt a lot an dshe is unable to carry items for too long  Patient stated she was recently in the ER due to hypertension  Patient stated she had high blood pressure 198/112  Patient stated it is also hard for her to put on shoes  Patient has to sit down on the bathtub to put on shoes  Patient stated she has handles on her shower which help her with bathing but she has been afraid to fall recently due to being fatigue  Patient lives in senior living home on 8th floor  Patient stated that sometimes the elevator breakdown and she has to walk down or up the stairs  Patient has SSI $951  Patient has SNAP  Patient's son, Ami Jolley and daughter in law, Papito Hoffman help out with running errands such as groceries  Patient stated Ami Jolley works in the AM and Papito Hoffman works in the PM  Patient stated Papito Hoffman takes the patient to medical appts but recently has been difficult with   Patient stated she takes the public bus but would like to apply for [employer]  SW advised she can make a referral to a community health worker for transportation  Patient agreed  Patient stated she is not in conditions to walk or go up and down hills when taking the bus and going to medical appts  Patient stated she has a [employer], grocery store and bank close by but it has been difficult for her to walk  Patient needs someone to accompany her to medical appts  SW made a referral for a community health worker in regards to transportation  [facility] will route this note to ELISABETH Gibbs has created task to send over to provider for completion of PA Waiver  PROSPER had provided her contact information  JULIOCESAR had also provided the contact information to [facility] [address] as she will be following up on this patient  PROSPER had discussed case with Kalani GANDARA will continue to be available

## 2021-03-25 NOTE — TELEPHONE ENCOUNTER
Pt called, stated that she hadn't received a response from us  Requested a , Lyudmila Varela spoke with the pt, informed her as per Dr Sissy Vernon she can start the losartan  She explained that she had a reaction to losartan previously and requires the name brand  She will go to the pharmacy to  the medication and let us know if there are any issues

## 2021-03-26 ENCOUNTER — TELEPHONE (OUTPATIENT)
Dept: INTERNAL MEDICINE CLINIC | Facility: CLINIC | Age: 81
End: 2021-03-26

## 2021-03-26 NOTE — TELEPHONE ENCOUNTER
Pharmacy called to inform us patient does not want losartan (generic) patient wants brand name Cozaar  I called and informed patient that insurance will not cover brand  Patient states Losartan is "bad" for her and she will not take that medication  I confirmed with patient if we sent over a different medication that is generic that is similar would she be willing to try that medication, patient stated she will  Can we please send over a different medication for patient to try   Please advise

## 2021-03-27 NOTE — TELEPHONE ENCOUNTER
Patient was previously taking cozaar (losartan) 50mg  The only change I asked her to make was to take 100mg now  Since she was previously on cozaar 50, I prescribed cozaar 100   Can you please check with patient, she told me she was taking the cozaar 50

## 2021-03-29 ENCOUNTER — TELEPHONE (OUTPATIENT)
Dept: INTERNAL MEDICINE CLINIC | Facility: CLINIC | Age: 81
End: 2021-03-29

## 2021-03-29 NOTE — TELEPHONE ENCOUNTER
Folder Color- orange    Name of Form- Physician Certification Form    Form to be filled out by- Dr Millie Philippe and attending    Form to be Faxed to [phone]

## 2021-04-01 DIAGNOSIS — I10 ESSENTIAL HYPERTENSION: Primary | ICD-10-CM

## 2021-04-01 RX ORDER — LOSARTAN POTASSIUM 100 MG
100 TABLET ORAL DAILY
Qty: 90 TABLET | Refills: 3 | Status: SHIPPED | OUTPATIENT
Start: 2021-04-01 | End: 2021-11-19

## 2021-04-02 ENCOUNTER — PATIENT OUTREACH (OUTPATIENT)
Dept: INTERNAL MEDICINE CLINIC | Facility: CLINIC | Age: 81
End: 2021-04-02

## 2021-04-02 NOTE — PROGRESS NOTES
CHW received referral from [facility] Jeff Odom to assist patient with transportation  CHW review chart and notes from [address]  Patient states she had [employer] long ago and is willing to work with them as a back up  She prefer to have the transportation from [employer]  CHW will provide her with this tel # and also obtain a [employer] Signature       CHW will meet w/ pt on Tuesday April 6 at 9 am

## 2021-04-03 DIAGNOSIS — J30.89 ALLERGIC RHINITIS DUE TO OTHER ALLERGIC TRIGGER, UNSPECIFIED SEASONALITY: ICD-10-CM

## 2021-04-03 RX ORDER — LORATADINE 10 MG/1
10 TABLET ORAL DAILY
Qty: 30 TABLET | Refills: 2 | Status: SHIPPED | OUTPATIENT
Start: 2021-04-03 | End: 2021-05-21

## 2021-04-06 ENCOUNTER — PATIENT OUTREACH (OUTPATIENT)
Dept: INTERNAL MEDICINE CLINIC | Facility: CLINIC | Age: 81
End: 2021-04-06

## 2021-04-07 ENCOUNTER — PATIENT OUTREACH (OUTPATIENT)
Dept: INTERNAL MEDICINE CLINIC | Facility: CLINIC | Age: 81
End: 2021-04-07

## 2021-04-07 NOTE — PROGRESS NOTES
CHW spoke to patient to let her know the Nona Carton has been approved - Medical Trips are covered  Pt also provided the telephone number for Romeo Darling  I told patient she can utilized both and she should compare which one she likes  Pt also told me [facility] called her and will be calling again on Monday April 12 between 11-1pm to do another intake for the Waiver  Pt also asked about a outstanding bill she had  CHW check in with [facility] Counselor no outstanding bills on patients account for the clinic  CHW goals have been completed  CHW will be closing the case   Pt is aware if she needs new or existing resources she can reach out to the team

## 2021-04-08 ENCOUNTER — PATIENT OUTREACH (OUTPATIENT)
Dept: INTERNAL MEDICINE CLINIC | Facility: CLINIC | Age: 81
End: 2021-04-08

## 2021-04-08 NOTE — PROGRESS NOTES
SW has received update from Favian Keith this pt has been Liset Benson apprvoved and educated re her [employer] [place] rides  She has been re-scheduled for her [facility] intake on 4/12/21 for evaluation for the [place] Waiver Program        SW to f/u with pt at next PCP visit re same

## 2021-04-14 ENCOUNTER — TELEMEDICINE (OUTPATIENT)
Dept: INTERNAL MEDICINE CLINIC | Facility: CLINIC | Age: 81
End: 2021-04-14

## 2021-04-14 VITALS
WEIGHT: 124 LBS | SYSTOLIC BLOOD PRESSURE: 120 MMHG | TEMPERATURE: 99 F | DIASTOLIC BLOOD PRESSURE: 58 MMHG | HEIGHT: 63 IN | BODY MASS INDEX: 21.97 KG/M2

## 2021-04-14 DIAGNOSIS — R50.9 FEVER, UNSPECIFIED FEVER CAUSE: Primary | ICD-10-CM

## 2021-04-14 PROCEDURE — 99213 OFFICE O/P EST LOW 20 MIN: CPT | Performed by: INTERNAL MEDICINE

## 2021-04-14 NOTE — PROGRESS NOTES
Virtual Brief Visit    Assessment/Plan:    Intermittent Fevers:  -patient has been experiencing intermittent fevers since February  -was instructed to increase Tylenol doses but to not exceed 4000 mg daily  -patient was instructed to sleep in a cold room and use ice packs as needed for fever control  -patient was instructed to keep a log of her temperatures 3 times a day and to come to the clinic for a follow-up visit in the next 1-2 weeks          Reason for visit is   Chief Complaint   Patient presents with    Virtual Brief Visit     Loss of apetite,fatigue,feverish and sweating at night,temp during the day is between 99 0 to 100 0 , isbeing taking tylenol this whole time and she doesn't want to keep taking that  patient is reuqesting a medication to control her temp  Encounter provider Chris Marx MD    Provider located at [address] [place] [address] Road  [address] [place] [id number] [phone]    Recent Visits  No visits were found meeting these conditions  Showing recent visits within past 7 days and meeting all other requirements     Today's Visits  Date Type Provider Dept   04/14/21 Telemedicine Chris Marx MD [address] today's visits and meeting all other requirements     Future Appointments  No visits were found meeting these conditions  Showing future appointments within next 150 days and meeting all other requirements        After connecting through [employer] and patient was informed that this is a secure, HIPAA-compliant platform  She agrees to proceed  , the patient was identified by name and date of birth  Errol Storm was informed that this is a telemedicine visit and that the visit is being conducted through [facility] and patient was informed that this is a secure, HIPAA-compliant platform  She agrees to proceed     My office door was closed  No one else was in the room    She acknowledged consent and understanding of privacy and security of the platform  The patient has agreed to participate and understands she can discontinue the visit at any time  Patient is aware this is a billable service  Subjective    Abel Mccracken is a [de-identified] y o  female with a PMH of GERD, Diverticulosis, allergic rhinitis, hypertension, CKD stage 3, B12 deficiency, hyperlipidemia and osteoarthritis who is seen in the clinic today via a telephone encounter due to intermittent fevers  Over the last couple months  The encounter was done via a telephone call because the patient was not able to get the video functioning  She complained of intermittent fevers since February as well as loss of appetite, fatigue and night sweats  She has been taking occasional Tylenol at home without much relief  She believes that the fever started after getting her first COVID-19 vaccination  She still has yet to get the 2nd COVID vaccine  She was recently seen in the emergency department at [facility] for elevated BP and fever but workup was negative  Otherwise the patient had no other acute complaints and has not been experiencing any recent chest pain or shortness breath  Past Medical History:   Diagnosis Date    A-fib ([facility] [address] )     Anemia     Arthritis     Breast pain, right     Chest pain on breathing     Colon polyp     Cough     Diverticulosis     Encounter for screening colonoscopy     H/O sick sinus syndrome     Heart murmur     High cholesterol     History of atrial fibrillation     Rate ontrolled  On xarelto 15mg (creatinine 50) Followed by Dr Aliyah Rodriguez with Cardiology     History of weight loss     Report loose fitting clothes  Weight stable (3lbs difference)  Last colo showed small tubular adenoma x2  Breast biopsy last showed fibrocystic changes  TSH wnl  Will check cbc, bmp, spep          Hx of long term use of blood thinners     Hypertension     Irregular heart beat     Pacemaker     Preop examination     Shortness of breath     Viral bronchitis        Past Surgical History:   Procedure Laterality Date    BREAST BIOPSY Right 08/17/2006    ultrasound guided Percutaneous Needle Core -Benign    CATARACT EXTRACTION W/ INTRAOCULAR LENS  IMPLANT, BILATERAL      COLONOSCOPY      COLONOSCOPY N/A 5/22/2018    Procedure: COLONOSCOPY;  Surgeon: Carissa Gutierrez DO;  [facility]; Service: Gastroenterology    Bibi Kumar / Merlin Ceci / Bibi Partida Right     as a child after a fall    MAMMO STEREOTACTIC BREAST BIOPSY RIGHT (ALL INC) Right 10/2014    benign    [place] CMBND ANTERPOST COLPORRAPHY W/CYSTO N/A 3/17/2016    Procedure: COLPORRHAPHY ANTERIOR POSTERIOR ;  Surgeon: Sushil Morse MD;  [facility];  Service: Gynecology    [place] COLONOSCOPY FLX DX W/COLLJ [address] WHEN PFRMD N/A 4/4/2019    Procedure: COLONOSCOPY;  Surgeon: Carissa Gutierrez DO;  [facility]; Service: Gastroenterology    AZ CYSTOURETHROSCOPY N/A 3/17/2016    Procedure: CYSTOSCOPY;  Surgeon: Sushil Morse MD;  [facility];  Service: Gynecology    AZ ESOPHAGOGASTRODUODENOSCOPY TRANSORAL DIAGNOSTIC N/A 4/16/2018    Procedure: EGD AND COLONOSCOPY;  Surgeon: Carissa Gutierrez DO;  [facility];   Service: Gastroenterology    AZ REVAGINAL PROLAPSE,SACROSP LIG N/A 3/17/2016    Procedure: COLPOPEXY VAGINAL EXTRAPERITONEAL (VEC) ANTERIOR ;  Surgeon: Sushil Morse MD;  [facility];  Service: Gynecology    [place] SLING OPER STRES INCONTINENCE N/A 3/17/2016    Procedure: INSERTION PUBOVAGINAL SLING SINGLE INCISION ;  Surgeon: Sushil Morse MD;  [facility];  Service: Gynecology       Current Outpatient Medications   Medication Sig Dispense Refill    Cozaar 100 MG tablet Take 1 tablet (100 mg total) by mouth daily 90 tablet 3    cyanocobalamin 1,000 mcg/mL Inject 1 mL (1,000 mcg total) into a muscle every 30 (thirty) days 3 mL 3    furosemide (LASIX) 20 mg tablet Take 1 tablet (20 mg total) by mouth daily 90 tablet 3    loratadine (CLARITIN) 10 mg tablet TAKE 1 TABLET (10 MG TOTAL) BY MOUTH DAILY 30 tablet 2    losartan (COZAAR) 100 MG tablet Take 1 tablet (100 mg total) by mouth daily 90 tablet 0    nebivolol (BYSTOLIC) 5 mg tablet Take 1 tablet (5 mg total) by mouth daily 90 tablet 3    rivaroxaban (Xarelto) 15 mg tablet Take 1 tablet (15 mg total) by mouth daily 90 tablet 3    rosuvastatin (CRESTOR) 5 mg tablet Take 1 tablet (5 mg total) by mouth daily 90 tablet 3    spironolactone (ALDACTONE) 25 mg tablet Take 1 tablet (25 mg total) by mouth daily 90 tablet 3    verapamil (VERELAN PM) 120 MG 24 hr capsule Take 1 capsule (120 mg total) by mouth daily at bedtime 90 capsule 3    fluticasone (FLONASE) 50 mcg/act nasal spray 1 spray into each nostril daily (Patient not taking: Reported on 3/23/2021) 1 Bottle 1     Current Facility-Administered Medications   Medication Dose Route Frequency Provider Last Rate Last Admin    cyanocobalamin injection 1,000 mcg  1,000 mcg Intramuscular Q30 Days Dru Hough MD   1,000 mcg at 03/23/21 1007        Allergies   Allergen Reactions    Other Itching     Bandaids    Tape [Medical Tape] Itching       Review of Systems   Constitutional: Positive for appetite change, diaphoresis, fatigue and fever  Negative for activity change and chills  HENT: Negative for congestion, ear discharge, ear pain, hearing loss, sinus pressure, sinus pain and sore throat  Eyes: Negative for pain, discharge, redness and itching  Respiratory: Negative for cough, chest tightness, shortness of breath and wheezing  Cardiovascular: Negative for chest pain, palpitations and leg swelling  Gastrointestinal: Negative for abdominal distention, abdominal pain, blood in stool, constipation, diarrhea, nausea and vomiting  Genitourinary: Negative for difficulty urinating, dysuria, flank pain and frequency     Musculoskeletal: Negative for arthralgias, back pain and gait problem  Skin: Negative for color change, pallor and rash  Neurological: Negative for dizziness, weakness, light-headedness, numbness and headaches  Psychiatric/Behavioral: Negative for agitation, behavioral problems, confusion and decreased concentration  Vitals:    04/14/21 1618   BP: 120/58   Temp: 99 °F (37 2 °C)   TempSrc: Temporal   Weight: 56 2 kg (124 lb)   Height: 5' 3" (1 6 m)         I spent 25 minutes directly with the patient during this visit    [address] [facility] Anne Marie Jules acknowledges that she has consented to an online visit or consultation  She understands that the online visit is based solely on information provided by her, and that, in the absence of a face-to-face physical evaluation by the physician, the diagnosis she receives is both limited and provisional in terms of accuracy and completeness  This is not intended to replace a full medical face-to-face evaluation by the physician  William Winston understands and accepts these terms

## 2021-04-21 ENCOUNTER — OFFICE VISIT (OUTPATIENT)
Dept: INTERNAL MEDICINE CLINIC | Facility: CLINIC | Age: 81
End: 2021-04-21

## 2021-04-21 VITALS
HEART RATE: 102 BPM | HEIGHT: 63 IN | TEMPERATURE: 97.5 F | WEIGHT: 133 LBS | SYSTOLIC BLOOD PRESSURE: 179 MMHG | BODY MASS INDEX: 23.57 KG/M2 | DIASTOLIC BLOOD PRESSURE: 79 MMHG

## 2021-04-21 DIAGNOSIS — I10 ESSENTIAL HYPERTENSION: Chronic | ICD-10-CM

## 2021-04-21 DIAGNOSIS — R50.9 TEMPERATURE ELEVATED: Primary | ICD-10-CM

## 2021-04-21 PROCEDURE — 1160F RVW MEDS BY RX/DR IN RCRD: CPT | Performed by: INTERNAL MEDICINE

## 2021-04-21 PROCEDURE — 99213 OFFICE O/P EST LOW 20 MIN: CPT | Performed by: INTERNAL MEDICINE

## 2021-04-21 PROCEDURE — 1036F TOBACCO NON-USER: CPT | Performed by: INTERNAL MEDICINE

## 2021-04-21 NOTE — PROGRESS NOTES
[facility] Visit Note  Hari Blunt [de-identified] y o  female   MRN: [id number]    Assessment and Plan      Diagnoses and all orders for this visit:    1  Temperature elevated - Patient comes in today with reports of subjective fever for the last 2 months  Temperature logs reviewed and patient has a T max of one reading of 100 0  Discussed with patient definition of fever  She has not endorsed any nausea, vomiting, abdominal pain, shortness of breath, or chest pain  No rashes noted on skin  She is asymptomatic other than some mild fatigue  Patient advised that if she develops temperature of 100 4 especially if she is not taking tylenol than she should give us a call  She has no medical contraindications for covid 19 vaccine  2  Essential hypertension - Blood pressure elevated in office - patient admits to not taking her blood pressure medicines this morning - advised to take it, she denies any symptoms associated with it  Health Maintenance: - Short term follow up visit for acute complaint - patient will follow up with PCP for other mait  Colonoscopy: Flat larger than 15 mm residual polyp in colon 1 year follow up 2021  DEXA: Last performed 2016 - lowest T score -1 2 in lumbar spine - 10 year hip fracture was 1%, and 10 year osteoporotic fractures 6% at that time  Mammogram - last performed 2021 and negative  Schedule a follow-up appointment in 3 months  Chief Complaint: Fevers  Subjective     History of Present Illness: This is an [de-identified]year old female PMH of GERD, hypertension, Sick sinus syndrome, A fib, tubulous adenoma (noted in 2015 followed up in 2020 needs follow up in 2021), here for intermittent fevers  Patient reports that she has had interrmitent fevers since January 2021  She attributes this to starting right after the covid 19 vaccine  Patient presented today with complaint of fevers  Temperature logs were reviewed   T Max was 100 0  No definition of fevers  No localizing complaints  She has not endorsed any chest pain, shortness of breath,abdominal pain, nausea, vomiting, diarrhea, sensory or motor def  She does have an elevated blood pressure today but does not endorse any complaints regarding it, she does admit to not taking her blood pressure today  Review of Systems   All other systems reviewed and are negative          Current Outpatient Medications:     Cozaar 100 MG tablet, Take 1 tablet (100 mg total) by mouth daily, Disp: 90 tablet, Rfl: 3    cyanocobalamin 1,000 mcg/mL, Inject 1 mL (1,000 mcg total) into a muscle every 30 (thirty) days, Disp: 3 mL, Rfl: 3    fluticasone (FLONASE) 50 mcg/act nasal spray, 1 spray into each nostril daily, Disp: 1 Bottle, Rfl: 1    furosemide (LASIX) 20 mg tablet, Take 1 tablet (20 mg total) by mouth daily, Disp: 90 tablet, Rfl: 3    loratadine (CLARITIN) 10 mg tablet, TAKE 1 TABLET (10 MG TOTAL) BY MOUTH DAILY, Disp: 30 tablet, Rfl: 2    nebivolol (BYSTOLIC) 5 mg tablet, Take 1 tablet (5 mg total) by mouth daily, Disp: 90 tablet, Rfl: 3    rivaroxaban (Xarelto) 15 mg tablet, Take 1 tablet (15 mg total) by mouth daily, Disp: 90 tablet, Rfl: 3    rosuvastatin (CRESTOR) 5 mg tablet, Take 1 tablet (5 mg total) by mouth daily, Disp: 90 tablet, Rfl: 3    spironolactone (ALDACTONE) 25 mg tablet, Take 1 tablet (25 mg total) by mouth daily, Disp: 90 tablet, Rfl: 3    verapamil (VERELAN PM) 120 MG 24 hr capsule, Take 1 capsule (120 mg total) by mouth daily at bedtime, Disp: 90 capsule, Rfl: 3    losartan (COZAAR) 100 MG tablet, Take 1 tablet (100 mg total) by mouth daily (Patient not taking: Reported on 4/21/2021), Disp: 90 tablet, Rfl: 0    Current Facility-Administered Medications:     cyanocobalamin injection 1,000 mcg, 1,000 mcg, Intramuscular, Q30 Days, Zac Morley MD, 1,000 mcg at 03/23/21 1007  Past Medical History:   Diagnosis Date    A-fib (HCC)     Anemia     Arthritis  Breast pain, right     Chest pain on breathing     Colon polyp     Cough     Diverticulosis     Encounter for screening colonoscopy     H/O sick sinus syndrome     Heart murmur     High cholesterol     History of atrial fibrillation     Rate ontrolled  On xarelto 15mg (creatinine 50) Followed by Dr Pankaj Jean-Baptiste with Cardiology     History of weight loss     Report loose fitting clothes  Weight stable (3lbs difference)  Last colo showed small tubular adenoma x2  Breast biopsy last showed fibrocystic changes  TSH wnl  Will check cbc, bmp, spep   Hx of long term use of blood thinners     Hypertension     Irregular heart beat     Pacemaker     Preop examination     Shortness of breath     Viral bronchitis      Past Surgical History:   Procedure Laterality Date    BREAST BIOPSY Right 08/17/2006    ultrasound guided Percutaneous Needle Core -Benign    CATARACT EXTRACTION W/ INTRAOCULAR LENS  IMPLANT, BILATERAL      COLONOSCOPY      COLONOSCOPY N/A 5/22/2018    Procedure: COLONOSCOPY;  Surgeon: Kristy Deleon DO;  [facility]; Service: Gastroenterology    Ace Tucker / Kallie Kaur / Lou Tobar Right     as a child after a fall    MAMMO STEREOTACTIC BREAST BIOPSY RIGHT (ALL INC) Right 10/2014    benign    [place] CMBND ANTERPOST COLPORRAPHY W/CYSTO N/A 3/17/2016    Procedure: COLPORRHAPHY ANTERIOR POSTERIOR ;  Surgeon: Carmen Pickard MD;  [facility];  Service: Gynecology    [place] COLONOSCOPY FLX DX W/COLLJ [address] WHEN PFRMD N/A 4/4/2019    Procedure: COLONOSCOPY;  Surgeon: Kristy Deleon DO;  [facility]; Service: Gastroenterology    CA CYSTOURETHROSCOPY N/A 3/17/2016    Procedure: CYSTOSCOPY;  Surgeon: Carmen Pickard MD;  [facility];  Service: Gynecology    CA ESOPHAGOGASTRODUODENOSCOPY TRANSORAL DIAGNOSTIC N/A 4/16/2018    Procedure: EGD AND COLONOSCOPY;  Surgeon: Kristy Deleon DO;  [facility];   Service: Gastroenterology    [place] Kelsie Sergey LIG N/A 3/17/2016    Procedure: COLPOPEXY VAGINAL EXTRAPERITONEAL (VEC) ANTERIOR ;  Surgeon: Vee Stanford MD;  [facility];  Service: Gynecology    [place] SLING OPER STRES INCONTINENCE N/A 3/17/2016    Procedure: INSERTION PUBOVAGINAL SLING SINGLE INCISION ;  Surgeon: Vee Stanford MD;  [facility];  Service: Gynecology     Social History     Socioeconomic History    Marital status:      Spouse name: Not on file    Number of children: Not on file    Years of education: Not on file    Highest education level: Not on file   Occupational History    Occupation: retired   Social Needs    Financial resource strain: Somewhat hard   [place] insecurity     Worry: Never true     Inability: Never true    Transportation needs     Medical: No     Non-medical: No   Tobacco Use    Smoking status: Never Smoker    Smokeless tobacco: Never Used   Substance and Sexual Activity    Alcohol use: No    Drug use: No    Sexual activity: Not Currently     Comment:    Lifestyle    Physical activity     Days per week: 0 days     Minutes per session: 0 min    Stress: Not at all   Relationships    Social connections     Talks on phone: More than three times a week     Gets together: More than three times a week     Attends Jainism service: More than 4 times per year     Active member of club or organization: No     Attends meetings of clubs or organizations: Never     Relationship status:      Intimate partner violence     Fear of current or ex partner: No     Emotionally abused: No     Physically abused: No     Forced sexual activity: No   Other Topics Concern    Not on file   Social History Narrative    Housing, household, and economic circumstances      Family History   Problem Relation Age of Onset    Diabetes Mother     Breast cancer Sister 48    No Known Problems Father     No Known Problems Maternal Grandmother     No Known Problems Maternal Grandfather     No Known Problems Paternal Grandmother     No Known Problems Paternal Grandfather     No Known Problems Daughter     No Known Problems Son     No Known Problems Sister     No Known Problems Sister     No Known Problems Brother     No Known Problems Brother     No Known Problems Sister     No Known Problems Paternal Aunt     No Known Problems Paternal Aunt     No Known Problems Paternal Aunt     No Known Problems Paternal Aunt      Allergies   Allergen Reactions    Other Itching     Bandaids    Tape [Medical Tape] Itching       Objective     Vitals:    04/21/21 0826   BP: (!) 179/79   BP Location: Left arm   Patient Position: Sitting   Cuff Size: Adult   Pulse: 102   Temp: 97 5 °F (36 4 °C)   TempSrc: Temporal   Weight: 60 3 kg (133 lb)   Height: 5' 3" (1 6 m)       Physical exam:     GENERAL: NAD   HEENT:  NC/AT, PERRL, EOMI, no scleral icterus  CARDIAC:  RRR, +S1/S2, no S3/S4 heard, no m/g/r  PULMONARY:  CTA B/L, no wheezing/rales/rhonci, non-labored breathing  ABDOMEN:  Soft, NT/ND,no rebound/guarding/rigidity  Extremities:  No edema, cyanosis, or clubbing  NEUROLOGIC: Grossly intact  SKIN:  No rashes or erythema noted on exposed skin  Psych: Normal affect        ==  PLEASE NOTE:  This encounter was completed utilizing the [employer] Direct Speech Voice Recognition Software  Grammatical errors, random word insertions, pronoun errors and incomplete sentences are occasional consequences of the system due to software limitations, ambient noise and hardware issues  These may be missed by proof reading prior to affixing electronic signature  Any questions or concerns about the content, text or information contained within the body of this dictation should be directly addressed to the physician for clarification  Please do not hesitate to call me directly if you have any any questions or concerns

## 2021-04-28 ENCOUNTER — OFFICE VISIT (OUTPATIENT)
Dept: CARDIOLOGY CLINIC | Facility: CLINIC | Age: 81
End: 2021-04-28
Payer: COMMERCIAL

## 2021-04-28 VITALS
DIASTOLIC BLOOD PRESSURE: 68 MMHG | BODY MASS INDEX: 23.39 KG/M2 | HEIGHT: 63 IN | WEIGHT: 132 LBS | HEART RATE: 76 BPM | SYSTOLIC BLOOD PRESSURE: 162 MMHG

## 2021-04-28 DIAGNOSIS — R06.00 DOE (DYSPNEA ON EXERTION): ICD-10-CM

## 2021-04-28 DIAGNOSIS — I48.20 CHRONIC ATRIAL FIBRILLATION (HCC): ICD-10-CM

## 2021-04-28 DIAGNOSIS — I10 ESSENTIAL HYPERTENSION: Primary | ICD-10-CM

## 2021-04-28 PROCEDURE — 1036F TOBACCO NON-USER: CPT | Performed by: INTERNAL MEDICINE

## 2021-04-28 PROCEDURE — 93000 ELECTROCARDIOGRAM COMPLETE: CPT | Performed by: INTERNAL MEDICINE

## 2021-04-28 PROCEDURE — 1160F RVW MEDS BY RX/DR IN RCRD: CPT | Performed by: INTERNAL MEDICINE

## 2021-04-28 PROCEDURE — 99214 OFFICE O/P EST MOD 30 MIN: CPT | Performed by: INTERNAL MEDICINE

## 2021-04-28 PROCEDURE — 3078F DIAST BP <80 MM HG: CPT | Performed by: INTERNAL MEDICINE

## 2021-04-28 PROCEDURE — 3077F SYST BP >= 140 MM HG: CPT | Performed by: INTERNAL MEDICINE

## 2021-04-28 RX ORDER — ADHESIVE BANDAGE 3/4"
BANDAGE TOPICAL EVERY OTHER DAY
Qty: 1 EACH | Refills: 0 | Status: SHIPPED | OUTPATIENT
Start: 2021-04-28

## 2021-04-28 NOTE — PATIENT INSTRUCTIONS
1  Your blood pressure is very high in the office today, for the next two weeks I would like you to monitor your BP from home, best way to do this is in the morning after you use the bathroom with feet flat on the floor with back supported in a chair for 5 minutes then record your pressure, do this every other day and call me at [phone] and let me know what your blood pressures are  Our goal Blood pressure for you is <130/80     2   Due to your fatigue and SOB I am going to order an ultrasound of your heart and a stress test to ensure the heart is pumping properly

## 2021-04-28 NOTE — PROGRESS NOTES
Electrophysiology Office Note    [facility], [place], operated by [facility]  1940  [id number]  HEART & VASCULAR [facility]  [address]        Assessment/Plan     1  Essential hypertension  Blood Pressure Monitoring (Blood Pressure Cuff) MISC   2  Chronic atrial fibrillation (HCC)  POCT ECG   3  CUEVAS (dyspnea on exertion)  Echo complete with contrast if indicated    NM myocardial perfusion spect (rx stress and/or rest)     1  Permanent atrial fibrillation    * patient presents to B EP office for routine f/u with Dr Jasmina Corona    * today ECG showing VS and  complexes with underlying atrial fibrillation    * device interrogated by me today showing stable lead parameters    * she is on [facility] w/ xarelto which she is tolerating without any major or minor bleeding    * continue current medication regimen     2  HTN    * BP uncontrolled in the office today, she is extremely  Hesitant to add new medications to her regimen    * she keeps a low salt and low fat diet    * wrote her a script for a new BP cuff, if insurance does not cover recommend she go to [employer] or similar store and purchase a new cuff, would like her to monitor her BP's at home as instructed by the NIDIA OMALLEY (written instructions provided) and call me in 2 weeks to report the values    * we will make changes as needed after more data is collected     3  Fatigue and SOB    * at last OV lasix was added to her regimen due to pulm HTN but this did not help her    * she has not had echo or stress since 2018, will perform both studies, I doubt her heart is the cause of her symptoms and did explain this to her    * will f/u with her on the results     4  TBS s/p right sided PPM  5   Hx left sided PPM generator erosion             Rhythm History:   Atrial fibrillation:     Atrial flutter:     SVT:     VT/VF:     Device history:   Pacemaker:    Defibrillator:    BIV PPM:    BIV ICD:    ILR:      Cardiac Testing:     ECHO:   Results for orders placed during the hospital encounter of 19   Echo complete with contrast if indicated    Narrative [address]  [facility], [address] [facility]  [phone]    Transthoracic Echocardiogram  2D, M-mode, Doppler, and Color Doppler    Study date:  2019    Patient: Nehal Hidalgo  MR number: [id number]  Account number: [de-identified]  : 1940  Age: 78 years  Gender: Female  Status: Outpatient  Location: [address] [place] [address]  Height: 62 in  Weight: 135 lb  BP: 134/ 68 mmHg    Indications: Atrial fibrillation  Diagnoses: I48 0 - Atrial fibrillation    Sonographer:  Octavio Serrano RDCS  Referring Physician:  Janell Boston MD  Group:  [facility]  Cardiology Fellow:  Jackson Guerrero MD  Interpreting Physician:  Trude Hashimoto, DO SUMMARY    LEFT VENTRICLE:  Systolic function was normal  Ejection fraction was estimated to be 60 %  There were no regional wall motion abnormalities  Wall thickness was mildly increased  RIGHT VENTRICLE:  Systolic function was normal     LEFT ATRIUM:  The atrium was markedly dilated  RIGHT ATRIUM:  The atrium was mildly to moderately dilated  MITRAL VALVE:  There was mild annular calcification  There was mild to moderate regurgitation  AORTIC VALVE:  There was moderate regurgitation  TRICUSPID VALVE:  There was moderate regurgitation  Estimated peak PA pressure was 50 mmHg  The findings suggest moderate pulmonary hypertension  IVC, HEPATIC VEINS:  The inferior vena cava was dilated  HISTORY: PRIOR HISTORY: Atrial fibrillation  Anemia  Sick sinus syndrome  Murmur, Hyperlipidemia  Hypertension  Pacemaker  PROCEDURE: The study was performed in the [address]  This was a routine study  The transthoracic approach was used  The study included complete 2D imaging, M-mode, complete spectral Doppler, and color Doppler   The  heart rate was 87 bpm, at the start of the study  Images were obtained from the parasternal, apical, subcostal, and suprasternal notch acoustic windows  Image quality was adequate  LEFT VENTRICLE: Size was normal  Systolic function was normal  Ejection fraction was estimated to be 60 %  There were no regional wall motion abnormalities  Wall thickness was mildly increased  DOPPLER: Left ventricular diastolic function  parameters were abnormal     RIGHT VENTRICLE: The size was normal  Systolic function was normal  Wall thickness was normal     LEFT ATRIUM: The atrium was markedly dilated  RIGHT ATRIUM: The atrium was mildly to moderately dilated  MITRAL VALVE: There was mild annular calcification  Valve structure was normal  There was normal leaflet separation  DOPPLER: The transmitral velocity was within the normal range  There was no evidence for stenosis  There was mild to  moderate regurgitation  AORTIC VALVE: The valve was trileaflet  Leaflets exhibited mildly increased thickness, mild calcification, normal cuspal separation, and sclerosis  DOPPLER: Transaortic velocity was within the normal range  There was no evidence for  stenosis  There was moderate regurgitation  TRICUSPID VALVE: The valve structure was normal  There was normal leaflet separation  DOPPLER: The transtricuspid velocity was within the normal range  There was no evidence for stenosis  There was moderate regurgitation  Pulmonary artery  systolic pressure was moderately increased  Estimated peak PA pressure was 50 mmHg  The findings suggest moderate pulmonary hypertension  PULMONIC VALVE: Leaflets exhibited normal thickness, no calcification, and normal cuspal separation  DOPPLER: The transpulmonic velocity was within the normal range  There was trace regurgitation  PERICARDIUM: There was no pericardial effusion  AORTA: The root exhibited normal size  SYSTEMIC VEINS: IVC: The inferior vena cava was dilated   Respirophasic changes were blunted (less than 50% variation)  MEASUREMENT TABLES    DOPPLER MEASUREMENTS  Tricuspid valve   (Reference normals)  RV-RA peak gradient   34 mmHg   (--)    SYSTEM MEASUREMENT TABLES    2D  %FS: 35 67 %  Ao Diam: 2 72 cm  EDV(Teich): 81 53 ml  EF(Cube): 73 38 %  EF(Teich): 65 51 %  ESV(Cube): 20 67 ml  ESV(Teich): 28 13 ml  IVSd: 1 cm  LA Area: 30 38 cm2  LA Diam: 4 39 cm  LVEDV MOD A4C: 68 85 ml  LVEF MOD A4C: 62 37 %  LVESV MOD A4C: 25 91 ml  LVIDd: 4 27 cm  LVIDs: 2 74 cm  LVLd A4C: 6 49 cm  LVLs A4C: 5 97 cm  LVPWd: 0 98 cm  RA Area: 17 04 cm2  RV Diam : 3 26 cm  SV MOD A4C: 42 94 ml  SV(Cube): 56 98 ml  SV(Teich): 53 41 ml    CW  AR Dec [place]elope: 2 72 m/s2  AR Dec Time: 1416 28 ms  AR PHT: 410 72 ms  AR Vmax: 3 86 m/s  AR maxP 48 mmHg  TR Vmax: 2 52 m/s  TR maxP 21 mmHg    MM  TAPSE: 1 28 cm    [facility] Accredited Echocardiography Laboratory    Prepared and electronically signed by    Juliette Melvin DO  Signed 2019 13:12:25         CATH/STRESS TEST:   SUMMARY:  -  Stress results: There was no chest pain during stress  -  ECG conclusions: The stress ECG was non-diagnostic   -  Perfusion imaging: There were no perfusion defects   -  Gated SPECT: The calculated left ventricular ejection fraction was 76 %  There was no left ventricular regional abnormality      IMPRESSIONS: Normal study after pharmacologic vasodilation  Myocardial perfusion imaging was normal at rest and with stress  Left ventricular systolic function was normal        EKG:   Underlying AF w/ intermittent  and VS         History of Present Illness     HPI/INTERVAL HISTORY: Godwin Cohn is a [de-identified] y o  female with history as above who presents to B EP office today under direction of Dr Melisa Lin for routine f/u      21:   Since her last OV she has a few new concerns today  1  She received her covid vaccine in February and a few weeks after kept having "fevers" at home with associated malaise + fatigue  She was evaluated in the ED in March 2021 but did not have any emergent findings being discharged home  2  She has been concerned with her BP being very high recently  She has an older BP cuff at home and does not monitor her pressures on a regular basis  At her last OV due to SOB she was placed on furosemide but does not like the medication? 3  She is concerned with exertional SOB and fatigue  She used to be able to walk from her apartment to her [employer] close by and to the store a few blocks away from her house but she is unable to do so now  Review of Systems  ROS as noted above, otherwise 12 point review of systems was performed and is negative  Historical Information   Social History     Socioeconomic History    Marital status:      Spouse name: Not on file    Number of children: Not on file    Years of education: Not on file    Highest education level: Not on file   Occupational History    Occupation: retired   Social Needs    Financial resource strain: Somewhat hard   [place] insecurity     Worry: Never true     Inability: Never true    Transportation needs     Medical: No     Non-medical: No   Tobacco Use    Smoking status: Never Smoker    Smokeless tobacco: Never Used   Substance and Sexual Activity    Alcohol use: No    Drug use: No    Sexual activity: Not Currently     Comment:    Lifestyle    Physical activity     Days per week: 0 days     Minutes per session: 0 min    Stress: Not at all   Relationships    Social connections     Talks on phone: More than three times a week     Gets together: More than three times a week     Attends Zoroastrianism service: More than 4 times per year     Active member of club or organization: No     Attends meetings of clubs or organizations: Never     Relationship status:      Intimate partner violence     Fear of current or ex partner: No     Emotionally abused: No     Physically abused: No     Forced sexual activity: No Other Topics Concern    Not on file   Social History Narrative    Housing, household, and economic circumstances      Past Medical History:   Diagnosis Date    A-fib ([address] )     Anemia     Arthritis     Breast pain, right     Chest pain on breathing     Colon polyp     Cough     Diverticulosis     Encounter for screening colonoscopy     H/O sick sinus syndrome     Heart murmur     High cholesterol     History of atrial fibrillation     Rate ontrolled  On xarelto 15mg (creatinine 50) Followed by Dr Miguel Kauffman with Cardiology     History of weight loss     Report loose fitting clothes  Weight stable (3lbs difference)  Last colo showed small tubular adenoma x2  Breast biopsy last showed fibrocystic changes  TSH wnl  Will check cbc, bmp, spep   Hx of long term use of blood thinners     Hypertension     Irregular heart beat     Pacemaker     Preop examination     Shortness of breath     Viral bronchitis      Past Surgical History:   Procedure Laterality Date    BREAST BIOPSY Right 08/17/2006    ultrasound guided Percutaneous Needle Core -Benign    CATARACT EXTRACTION W/ INTRAOCULAR LENS  IMPLANT, BILATERAL      COLONOSCOPY      COLONOSCOPY N/A 5/22/2018    Procedure: COLONOSCOPY;  Surgeon: Chencho Anderson DO;  [facility]; Service: Gastroenterology    Franc Brooks / Billy Nelson / Nikhil Redman Right     as a child after a fall    MAMMO STEREOTACTIC BREAST BIOPSY RIGHT (ALL INC) Right 10/2014    benign    UT CMBND ANTERPOST COLPORRAPHY W/CYSTO N/A 3/17/2016    Procedure: COLPORRHAPHY ANTERIOR POSTERIOR ;  Surgeon: Mary Jane Garcia MD;  [facility];  Service: Gynecology    UT COLONOSCOPY FLX DX W/[facility] WHEN PFRMD N/A 4/4/2019    Procedure: COLONOSCOPY;  Surgeon: Chencho Anderson DO;  [facility];   Service: Gastroenterology    UT CYSTOURETHROSCOPY N/A 3/17/2016    Procedure: CYSTOSCOPY;  Surgeon: Mary Jane Garcia MD;  [facility];  Service: Gynecology    MI ESOPHAGOGASTRODUODENOSCOPY TRANSORAL DIAGNOSTIC N/A 4/16/2018    Procedure: EGD AND COLONOSCOPY;  Surgeon: Bola Uriostegui DO;  [facility];   Service: Gastroenterology    MI REVAGINAL PROLAPSE,SACROSP LIG N/A 3/17/2016    Procedure: COLPOPEXY VAGINAL EXTRAPERITONEAL (VEC) ANTERIOR ;  Surgeon: Ilya Rangel MD;  [facility];  Service: Gynecology    MI SLING OPER STRES INCONTINENCE N/A 3/17/2016    Procedure: INSERTION PUBOVAGINAL SLING SINGLE INCISION ;  Surgeon: Ilya Rangel MD;  [facility];  Service: Gynecology     Social History     Substance and Sexual Activity   Alcohol Use No     Social History     Substance and Sexual Activity   Drug Use No     Social History     Tobacco Use   Smoking Status Never Smoker   Smokeless Tobacco Never Used     Family History   Problem Relation Age of Onset    Diabetes Mother     Breast cancer Sister 48    No Known Problems Father     No Known Problems Maternal Grandmother     No Known Problems Maternal Grandfather     No Known Problems Paternal Grandmother     No Known Problems Paternal Grandfather     No Known Problems Daughter     No Known Problems Son     No Known Problems Sister     No Known Problems Sister     No Known Problems Brother     No Known Problems Brother     No Known Problems Sister     No Known Problems Paternal Aunt     No Known Problems Paternal Aunt     No Known Problems Paternal Aunt     No Known Problems Paternal Aunt        Meds/Allergies       Current Outpatient Medications:     Cozaar 100 MG tablet, Take 1 tablet (100 mg total) by mouth daily, Disp: 90 tablet, Rfl: 3    cyanocobalamin 1,000 mcg/mL, Inject 1 mL (1,000 mcg total) into a muscle every 30 (thirty) days, Disp: 3 mL, Rfl: 3    furosemide (LASIX) 20 mg tablet, Take 1 tablet (20 mg total) by mouth daily, Disp: 90 tablet, Rfl: 3    loratadine (CLARITIN) 10 mg tablet, TAKE 1 TABLET (10 MG TOTAL) BY MOUTH DAILY, Disp: 30 tablet, Rfl: 2    nebivolol (BYSTOLIC) 5 mg tablet, Take 1 tablet (5 mg total) by mouth daily, Disp: 90 tablet, Rfl: 3    rivaroxaban (Xarelto) 15 mg tablet, Take 1 tablet (15 mg total) by mouth daily, Disp: 90 tablet, Rfl: 3    rosuvastatin (CRESTOR) 5 mg tablet, Take 1 tablet (5 mg total) by mouth daily, Disp: 90 tablet, Rfl: 3    spironolactone (ALDACTONE) 25 mg tablet, Take 1 tablet (25 mg total) by mouth daily, Disp: 90 tablet, Rfl: 3    verapamil (VERELAN PM) 120 MG 24 hr capsule, Take 1 capsule (120 mg total) by mouth daily at bedtime, Disp: 90 capsule, Rfl: 3    Blood Pressure Monitoring (Blood Pressure Cuff) MISC, Use every other day For HTN, Disp: 1 each, Rfl: 0    fluticasone (FLONASE) 50 mcg/act nasal spray, 1 spray into each nostril daily (Patient not taking: Reported on 4/28/2021), Disp: 1 Bottle, Rfl: 1    losartan (COZAAR) 100 MG tablet, Take 1 tablet (100 mg total) by mouth daily (Patient not taking: Reported on 4/21/2021), Disp: 90 tablet, Rfl: 0    Current Facility-Administered Medications:     cyanocobalamin injection 1,000 mcg, 1,000 mcg, Intramuscular, Q30 Days, Trang Nieves MD, 1,000 mcg at 03/23/21 1007    Allergies   Allergen Reactions    Other Itching     Bandaids    Tape [Medical Tape] Itching       Objective   Vitals: Blood pressure 162/68, pulse 76, height 5' 3" (1 6 m), weight 59 9 kg (132 lb)  Physical Exam  Constitutional:       Appearance: She is well-developed  HENT:      Head: Normocephalic and atraumatic  Eyes:      Pupils: Pupils are equal, round, and reactive to light  Neck:      Musculoskeletal: Normal range of motion and neck supple  Cardiovascular:      Rate and Rhythm: Normal rate  Rhythm irregularly irregular  Pulmonary:      Effort: Pulmonary effort is normal       Breath sounds: Normal breath sounds  Abdominal:      General: Bowel sounds are normal       Palpations: Abdomen is soft     Musculoskeletal: Normal range of motion  Skin:     General: Skin is warm and dry  Neurological:      Mental Status: She is alert and oriented to person, place, and time  Labs:not applicable    Imaging: I have personally reviewed pertinent reports

## 2021-05-05 ENCOUNTER — PATIENT OUTREACH (OUTPATIENT)
Dept: INTERNAL MEDICINE CLINIC | Facility: CLINIC | Age: 81
End: 2021-05-05

## 2021-05-05 NOTE — PROGRESS NOTES
[facility] had reached out to the patient as per Jose Elias Kirk [phone]  [facility] had asked the patient about the documents required for the [place] program  SWCM spoke in the preferred language, Sami  Patient stated she had receives a letter from [facility] on Sunday but the letter was sent out on 4/26  Patient stated the due dates for the documents is on 5/6  Patient stated she has called had called [facility] to obtain an extension  Patient stated they gave her an extension of 30 days  Patient stated she does not understand the document as it is in [facility] informed the patient she would reach out to [facility] so she can help her out with the application and documents needed  [facility] asked the patient if she can try to obtain some of the documents required for [facility] or have her son, Suha Guerrero and daughter-in-law, Amina Laughlin assist in in the meantime  Patient stated both Suha Guerrero and Amina Laughlin are working  Patient stated she has some documents but the bank statements will be hard to obtain as they are asking from [id number]  Patient stated if bank charges her for the statements from [id number] then she will not pay for it  Patient stated since she has had a fever for 30 days her son does not stop by unless it is to drop off food  Patient stated she had a daughter in [address] [place] [address] so she is unable to help at this time  Patient stated her blood pressure is uncontrolled  Patient stated she was in the ER on 3/19 with her grandson and has not managed to get better since  Patient stated that since her first does of the vaccine that is when she became ill  Patient stated cardiology is unable to do an MRI because of her pacemaker  Patient stated she has not been able to get her second dose since her fever continues to be high  Patient stated she checked it today and it seems to be going down with the Tylenol   Patient stated her granddaughter had called to schedule her second does but they explained if she continues to have a fever then she cannot take the second dose  SWCM encouraged the patient to seek medical attention sooner if her fever continues to not go down  Patient verbalized understanding  SWCM reminded the patient that Jona Page will assist with DHS paperwork  SWCM reminded the patient to call her or Inez Gonzalez will route this note to Jona Page and [address] [place] [address] will continue to follow up

## 2021-05-11 ENCOUNTER — PATIENT OUTREACH (OUTPATIENT)
Dept: INTERNAL MEDICINE CLINIC | Facility: CLINIC | Age: 81
End: 2021-05-11

## 2021-05-11 NOTE — PROGRESS NOTES
We been unable to reach this patient by phone  A letter is being sent to the last known home address

## 2021-05-11 NOTE — PROGRESS NOTES
CHW spoke to patient on this date  Pt states she needs help getting the documents to [place] office for the Waiver  CHW will meet with patient on Monday  CHW also send secured e-mail to Jaimee Saavedra from [facility] to make sure we have all documents that we need       CHW will continue to work with pt and communicate w/ team

## 2021-05-12 ENCOUNTER — REMOTE DEVICE CLINIC VISIT (OUTPATIENT)
Dept: CARDIOLOGY CLINIC | Facility: CLINIC | Age: 81
End: 2021-05-12
Payer: COMMERCIAL

## 2021-05-12 DIAGNOSIS — Z95.0 CARDIAC PACEMAKER IN SITU: Primary | ICD-10-CM

## 2021-05-12 PROCEDURE — 93294 REM INTERROG EVL PM/LDLS PM: CPT | Performed by: INTERNAL MEDICINE

## 2021-05-12 PROCEDURE — 93296 REM INTERROG EVL PM/IDS: CPT | Performed by: INTERNAL MEDICINE

## 2021-05-12 NOTE — PROGRESS NOTES
Results for orders placed or performed in visit on 05/12/21   Cardiac EP device report    Narrative    MDT-SINGLE CHAMBER PPM / NOT MRI CONDITIONAL  CARELINK TRANSMISSION: BATTERY VOLTAGE ADEQUATE-10 YRS   35%  ALL AVAILABLE LEAD PARAMETERS WITHIN NORMAL LIMITS  NO SIGNIFICANT HIGH RATE EPISODES  NORMAL DEVICE FUNCTION   NC       Current Outpatient Medications:     Blood Pressure Monitoring (Blood Pressure Cuff) MISC, Use every other day For HTN, Disp: 1 each, Rfl: 0    Cozaar 100 MG tablet, Take 1 tablet (100 mg total) by mouth daily, Disp: 90 tablet, Rfl: 3    cyanocobalamin 1,000 mcg/mL, Inject 1 mL (1,000 mcg total) into a muscle every 30 (thirty) days, Disp: 3 mL, Rfl: 3    fluticasone (FLONASE) 50 mcg/act nasal spray, 1 spray into each nostril daily (Patient not taking: Reported on 4/28/2021), Disp: 1 Bottle, Rfl: 1    furosemide (LASIX) 20 mg tablet, Take 1 tablet (20 mg total) by mouth daily, Disp: 90 tablet, Rfl: 3    loratadine (CLARITIN) 10 mg tablet, TAKE 1 TABLET (10 MG TOTAL) BY MOUTH DAILY, Disp: 30 tablet, Rfl: 2    losartan (COZAAR) 100 MG tablet, Take 1 tablet (100 mg total) by mouth daily (Patient not taking: Reported on 4/21/2021), Disp: 90 tablet, Rfl: 0    nebivolol (BYSTOLIC) 5 mg tablet, Take 1 tablet (5 mg total) by mouth daily, Disp: 90 tablet, Rfl: 3    rivaroxaban (Xarelto) 15 mg tablet, Take 1 tablet (15 mg total) by mouth daily, Disp: 90 tablet, Rfl: 3    rosuvastatin (CRESTOR) 5 mg tablet, Take 1 tablet (5 mg total) by mouth daily, Disp: 90 tablet, Rfl: 3    spironolactone (ALDACTONE) 25 mg tablet, Take 1 tablet (25 mg total) by mouth daily, Disp: 90 tablet, Rfl: 3    verapamil (VERELAN PM) 120 MG 24 hr capsule, Take 1 capsule (120 mg total) by mouth daily at bedtime, Disp: 90 capsule, Rfl: 3    Current Facility-Administered Medications:     cyanocobalamin injection 1,000 mcg, 1,000 mcg, Intramuscular, Q30 Days, Nusrat Bales MD, 1,000 mcg at 03/23/21 1007

## 2021-05-14 ENCOUNTER — PATIENT OUTREACH (OUTPATIENT)
Dept: INTERNAL MEDICINE CLINIC | Facility: CLINIC | Age: 81
End: 2021-05-14

## 2021-05-14 NOTE — PROGRESS NOTES
CHW met with patient on this date to get documents to fax to [place] office for the Waiver  Pt also signed a Release of Information for Medicaid office to give me information on patients case  Pt resides on [place] rise on [address]th Floor  Building has a elevator  Pt resides in a 1 bed apt  The apt is spacious and clean  Pt hopes to have the Waiver approved so she can get the help she needs       CHW will continue working with pt and communicate with team

## 2021-05-15 DIAGNOSIS — I48.20 CHRONIC ATRIAL FIBRILLATION (HCC): ICD-10-CM

## 2021-05-15 RX ORDER — VERAPAMIL HYDROCHLORIDE 120 MG/1
CAPSULE, EXTENDED RELEASE ORAL
Qty: 90 CAPSULE | Refills: 3 | Status: SHIPPED | OUTPATIENT
Start: 2021-05-15 | End: 2021-09-22

## 2021-05-18 ENCOUNTER — PATIENT OUTREACH (OUTPATIENT)
Dept: INTERNAL MEDICINE CLINIC | Facility: CLINIC | Age: 81
End: 2021-05-18

## 2021-05-18 ENCOUNTER — OFFICE VISIT (OUTPATIENT)
Dept: INTERNAL MEDICINE CLINIC | Facility: CLINIC | Age: 81
End: 2021-05-18

## 2021-05-18 VITALS
HEIGHT: 62 IN | OXYGEN SATURATION: 96 % | SYSTOLIC BLOOD PRESSURE: 137 MMHG | WEIGHT: 135.6 LBS | BODY MASS INDEX: 24.95 KG/M2 | TEMPERATURE: 96.7 F | HEART RATE: 84 BPM | DIASTOLIC BLOOD PRESSURE: 79 MMHG

## 2021-05-18 DIAGNOSIS — E53.8 B12 DEFICIENCY: ICD-10-CM

## 2021-05-18 DIAGNOSIS — I10 ESSENTIAL HYPERTENSION: Primary | ICD-10-CM

## 2021-05-18 PROCEDURE — 3725F SCREEN DEPRESSION PERFORMED: CPT | Performed by: INTERNAL MEDICINE

## 2021-05-18 PROCEDURE — 1160F RVW MEDS BY RX/DR IN RCRD: CPT | Performed by: INTERNAL MEDICINE

## 2021-05-18 PROCEDURE — 99214 OFFICE O/P EST MOD 30 MIN: CPT | Performed by: INTERNAL MEDICINE

## 2021-05-18 PROCEDURE — 1036F TOBACCO NON-USER: CPT | Performed by: INTERNAL MEDICINE

## 2021-05-18 PROCEDURE — 3075F SYST BP GE 130 - 139MM HG: CPT | Performed by: INTERNAL MEDICINE

## 2021-05-18 PROCEDURE — 3078F DIAST BP <80 MM HG: CPT | Performed by: INTERNAL MEDICINE

## 2021-05-18 RX ADMIN — CYANOCOBALAMIN 1000 MCG: 1000 INJECTION INTRAMUSCULAR; SUBCUTANEOUS at 13:03

## 2021-05-18 NOTE — PATIENT INSTRUCTIONS
Please schedule and obtain the 2nd COVID-19 vaccine  Please call us if you experience any severe fevers or other symptoms

## 2021-05-18 NOTE — PROGRESS NOTES
[facility]  INTERNAL MEDICINE OFFICE VISIT     PATIENT INFORMATION     Hari Mcneill   [de-identified] y o  female   MRN: [id number]    ASSESSMENT/PLAN     Need for second COVID-19 Vaccine:  · Patient obtained the 1st motor in a COVID-19 vaccine back in January and experienced severe fevers and illness in the weeks following ->  Because of this she did not obtain her 2nd vaccine  · Patient was encouraged to obtain the 2nd COVID-19 vaccine given her age and comorbid conditions  · She was agreeable to obtaining the 2nd COVID-19 vaccine  · The patient was instructed to schedule her 2nd COVID-19 vaccine immediately following our encounter and to call us if she experiences any significant symptoms following administration of the 2nd vaccine    B12 Deficiency:  ·  Patient receives monthly B12 injections  ·  Will administer B12 injection at today's appointment    Hypertension:  · Patient had a blood pressure 137/79 at today's visit (improved from her previous 2 visits which were 162/68 and 179/79)  · She was encouraged to continue her current antihypertensive regimen as well as continue with lifestyle modifications including proper diet and exercise as able    Schedule a follow-up appointment in 4 months      HEALTH MAINTENANCE     Immunization History   Administered Date(s) Administered    INFLUENZA 10/11/2005, 10/24/2006, 10/15/2007, 09/28/2015, 10/06/2016, 10/18/2017    Influenza Quadrivalent Preservative Free 3 years and older IM 10/06/2016    Influenza Quadrivalent, 6-35 Months IM 10/18/2017    Influenza, high dose seasonal 0 7 mL 10/12/2018, 09/23/2019, 09/23/2020    Influenza, seasonal, injectable 09/24/2013, 09/24/2014, 09/28/2015    Pneumococcal Conjugate 13-Valent 05/31/2016    Pneumococcal Polysaccharide PPV23 01/01/2001, 12/18/2019    SARS-CoV-2 / COVID-19 mRNA IM (Doris ) 02/19/2021    Tdap 03/12/2014     CHIEF COMPLAINT     Chief Complaint   Patient presents with    Follow-up HISTORY OF PRESENT ILLNESS     The patient is an 20-year-old female with a past medical history of GERD, hypertension, Afib, CKD 3, B12 deficiency, hyperlipidemia and osteoarthritis who presents to the clinic today for follow-up appointment  Her primary concern was that she wished to discuss whether not she should obtain the 2nd Moderna COVID-19 vaccine  She received the 1st dose of the COVID-19 vaccine back in January and for several weeks following the vaccine she experienced severe fevers, chills, nausea and overall feeling sick  Because of this she did not go back the following month to obtain the 2nd dose  Today she wanted to discuss the pros and cons of obtaining the 2nd vaccine and asked my opinion on whether not she should obtain it  I explained to her that I believe that it would be a good idea for her to get the 2nd vaccine because it would be better to experience another possible week of fevers than to contract the COVID-19 virus at her age and with her comorbidities  She was agreeable to this and she expressed that she would like to obtain the 2nd vaccine  Otherwise she was feeling well and had no other acute complaints  She denies any recent fevers, chills, chest pain, shortness of breath, or abdominal pain  Her blood pressure at today's visit was 137/79 which is improved from her previous 2 visits which was in the 649L and 432H systolic  She is also due for her monthly for vitamin B12 injection and will obtain that at today's visit  REVIEW OF SYSTEMS     Review of Systems   Constitutional: Negative for activity change, appetite change, chills, diaphoresis, fatigue and fever  HENT: Negative for congestion, ear discharge, ear pain, hearing loss, sinus pressure, sinus pain and sore throat  Eyes: Negative for pain, discharge, redness and itching  Respiratory: Negative for cough, chest tightness, shortness of breath and wheezing      Cardiovascular: Negative for chest pain, palpitations and leg swelling  Gastrointestinal: Negative for abdominal distention, abdominal pain, blood in stool, constipation, diarrhea, nausea and vomiting  Genitourinary: Negative for difficulty urinating, dysuria, flank pain and frequency  Musculoskeletal: Negative for arthralgias, back pain and gait problem  Skin: Negative for color change, pallor and rash  Neurological: Negative for dizziness, weakness, light-headedness, numbness and headaches  Psychiatric/Behavioral: Negative for agitation, behavioral problems, confusion and decreased concentration  OBJECTIVE     Vitals:    05/18/21 0854   BP: 137/79   BP Location: Left arm   Patient Position: Sitting   Cuff Size: Standard   Pulse: 84   Temp: (!) 96 7 °F (35 9 °C)   TempSrc: Temporal   SpO2: 96%   Weight: 61 5 kg (135 lb 9 6 oz)   Height: 5' 2" (1 575 m)     Physical Exam  Constitutional:       Appearance: She is well-developed  HENT:      Head: Normocephalic and atraumatic  Nose: Nose normal    Eyes:      Conjunctiva/sclera: Conjunctivae normal       Pupils: Pupils are equal, round, and reactive to light  Neck:      Vascular: No JVD  Cardiovascular:      Rate and Rhythm: Normal rate and regular rhythm  Heart sounds: Normal heart sounds  No murmur  No friction rub  No gallop  Pulmonary:      Effort: Pulmonary effort is normal  No respiratory distress  Breath sounds: Normal breath sounds  No stridor  No wheezing or rales  Chest:      Chest wall: No tenderness  Abdominal:      General: Bowel sounds are normal  There is no distension  Palpations: Abdomen is soft  There is no mass  Tenderness: There is no abdominal tenderness  There is no guarding or rebound  Hernia: No hernia is present  Musculoskeletal:         General: No tenderness or deformity  Right lower leg: No edema  Left lower leg: No edema  Skin:     General: Skin is warm and dry     Neurological:      Mental Status: She is alert and oriented to person, place, and time  Psychiatric:         Mood and Affect: Mood normal          Behavior: Behavior normal          Thought Content:  Thought content normal          Judgment: Judgment normal        CURRENT MEDICATIONS     Current Outpatient Medications:     Blood Pressure Monitoring (Blood Pressure Cuff) MISC, Use every other day For HTN, Disp: 1 each, Rfl: 0    Cozaar 100 MG tablet, Take 1 tablet (100 mg total) by mouth daily, Disp: 90 tablet, Rfl: 3    cyanocobalamin 1,000 mcg/mL, Inject 1 mL (1,000 mcg total) into a muscle every 30 (thirty) days, Disp: 3 mL, Rfl: 3    furosemide (LASIX) 20 mg tablet, Take 1 tablet (20 mg total) by mouth daily, Disp: 90 tablet, Rfl: 3    loratadine (CLARITIN) 10 mg tablet, TAKE 1 TABLET (10 MG TOTAL) BY MOUTH DAILY, Disp: 30 tablet, Rfl: 2    losartan (COZAAR) 100 MG tablet, Take 1 tablet (100 mg total) by mouth daily, Disp: 90 tablet, Rfl: 0    nebivolol (BYSTOLIC) 5 mg tablet, Take 1 tablet (5 mg total) by mouth daily, Disp: 90 tablet, Rfl: 3    rivaroxaban (Xarelto) 15 mg tablet, Take 1 tablet (15 mg total) by mouth daily, Disp: 90 tablet, Rfl: 3    rosuvastatin (CRESTOR) 5 mg tablet, Take 1 tablet (5 mg total) by mouth daily, Disp: 90 tablet, Rfl: 3    spironolactone (ALDACTONE) 25 mg tablet, Take 1 tablet (25 mg total) by mouth daily, Disp: 90 tablet, Rfl: 3    verapamil (VERELAN PM) 120 MG 24 hr capsule, TOME JUAN ANTONIO CAPSULA POR VIA ORAL ANTES DE DORMIR EN LA NOCHE, Disp: 90 capsule, Rfl: 3    fluticasone (FLONASE) 50 mcg/act nasal spray, 1 spray into each nostril daily (Patient not taking: Reported on 4/28/2021), Disp: 1 Bottle, Rfl: 1    Current Facility-Administered Medications:     cyanocobalamin injection 1,000 mcg, 1,000 mcg, Intramuscular, Q30 Days, Amy Goel MD, 1,000 mcg at 03/23/21 1007    PAST MEDICAL & SURGICAL HISTORY     Past Medical History:   Diagnosis Date    [facility]     Anemia     Arthritis     Breast pain, right     Chest pain on breathing     Colon polyp     Cough     Diverticulosis     Encounter for screening colonoscopy     H/O sick sinus syndrome     Heart murmur     High cholesterol     History of atrial fibrillation     Rate ontrolled  On xarelto 15mg (creatinine 50) Followed by Dr Shannon Silva with Cardiology     History of weight loss     Report loose fitting clothes  Weight stable (3lbs difference)  Last colo showed small tubular adenoma x2  Breast biopsy last showed fibrocystic changes  TSH wnl  Will check cbc, bmp, spep   Hx of long term use of blood thinners     Hypertension     Irregular heart beat     Pacemaker     Preop examination     Shortness of breath     Viral bronchitis      Past Surgical History:   Procedure Laterality Date    BREAST BIOPSY Right 08/17/2006    ultrasound guided Percutaneous Needle Core -Benign    CATARACT EXTRACTION W/ INTRAOCULAR LENS  IMPLANT, BILATERAL      COLONOSCOPY      COLONOSCOPY N/A 5/22/2018    Procedure: COLONOSCOPY;  Surgeon: Gaby Lawrence DO;  [facility]; Service: Gastroenterology    Radha Obrien / Geovany Pride / David Ferguson Right     as a child after a fall    MAMMO STEREOTACTIC BREAST BIOPSY RIGHT (ALL INC) Right 10/2014    benign    VT CMBND ANTERPOST COLPORRAPHY W/CYSTO N/A 3/17/2016    Procedure: COLPORRHAPHY ANTERIOR POSTERIOR ;  Surgeon: Justin Overton MD;  [facility];  Service: Gynecology    VT COLONOSCOPY FLX DX W/[facility] WHEN PFRMD N/A 4/4/2019    Procedure: COLONOSCOPY;  Surgeon: Gaby Lawrence DO;  [facility]; Service: Gastroenterology    VT CYSTOURETHROSCOPY N/A 3/17/2016    Procedure: CYSTOSCOPY;  Surgeon: Justin Overton MD;  [facility];  Service: Gynecology    VT ESOPHAGOGASTRODUODENOSCOPY TRANSORAL DIAGNOSTIC N/A 4/16/2018    Procedure: EGD AND COLONOSCOPY;  Surgeon: Gaby Lawrence DO;  [facility];   Service: Gastroenterology    VT Josy Vasquez PROLAPSE,SACROSP LIG N/A 3/17/2016    Procedure: COLPOPEXY VAGINAL EXTRAPERITONEAL (VEC) ANTERIOR ;  Surgeon: Vee Stanford MD;  [facility];  Service: Gynecology    MT SLING OPER STRES INCONTINENCE N/A 3/17/2016    Procedure: INSERTION PUBOVAGINAL SLING SINGLE INCISION ;  Surgeon: Vee Stanford MD;  [facility];  Service: Gynecology     SOCIAL & FAMILY HISTORY     Social History     Socioeconomic History    Marital status:      Spouse name: Not on file    Number of children: Not on file    Years of education: Not on file    Highest education level: Not on file   Occupational History    Occupation: retired   Social Needs    Financial resource strain: Somewhat hard   [facility] insecurity     Worry: Never true     Inability: Never true    Transportation needs     Medical: No     Non-medical: No   Tobacco Use    Smoking status: Never Smoker    Smokeless tobacco: Never Used   Substance and Sexual Activity    Alcohol use: No    Drug use: No    Sexual activity: Not Currently     Comment:    Lifestyle    Physical activity     Days per week: 0 days     Minutes per session: 0 min    Stress: Not at all   Relationships    Social connections     Talks on phone: More than three times a week     Gets together: More than three times a week     Attends [employer] service: More than 4 times per year     Active member of club or organization: No     Attends meetings of clubs or organizations: Never     Relationship status:      Intimate partner violence     Fear of current or ex partner: No     Emotionally abused: No     Physically abused: No     Forced sexual activity: No   Other Topics Concern    Not on file   Social History Narrative    Housing, household, and economic circumstances      Social History     Substance and Sexual Activity   Alcohol Use No     Substance and Sexual Activity   Alcohol Use No        Substance and Sexual Activity   Drug Use No     Social History     Tobacco Use   Smoking Status Never Smoker   Smokeless Tobacco Never Used     Family History   Problem Relation Age of Onset    Diabetes Mother     Breast cancer Sister 48    No Known Problems Father     No Known Problems Maternal Grandmother     No Known Problems Maternal Grandfather     No Known Problems Paternal Grandmother     No Known Problems Paternal Grandfather     No Known Problems Daughter     No Known Problems Son     No Known Problems Sister     No Known Problems Sister     No Known Problems Brother     No Known Problems Brother     No Known Problems Sister     No Known Problems Paternal Aunt     No Known Problems Paternal Aunt     No Known Problems Paternal Aunt     No Known Problems Paternal Aunt      ==  Cata Clark MD   Resident, PGY-1  [facility] Internal Medicine [address]   [facility] , [address] [address] [facility] [address], [address] [facility]  Office: [phone]  Fax: [phone]

## 2021-05-18 NOTE — PROGRESS NOTES
CHW faxed [facility] Statements and Documents to [employer] [facility] to get approved by [place] office  A Confirmation has been received       CHW will continue to follow up with pt and communicate w/ team

## 2021-05-21 DIAGNOSIS — J30.89 ALLERGIC RHINITIS DUE TO OTHER ALLERGIC TRIGGER, UNSPECIFIED SEASONALITY: ICD-10-CM

## 2021-05-21 RX ORDER — LORATADINE 10 MG/1
10 TABLET ORAL DAILY
Qty: 30 TABLET | Refills: 2 | Status: SHIPPED | OUTPATIENT
Start: 2021-05-21 | End: 2021-08-31

## 2021-05-25 ENCOUNTER — TELEPHONE (OUTPATIENT)
Dept: INTERNAL MEDICINE CLINIC | Facility: CLINIC | Age: 81
End: 2021-05-25

## 2021-05-25 NOTE — TELEPHONE ENCOUNTER
Patient has called in requesting to schedule her Covid booster  This cannot be scheduled till we obtain a copy of the vaccination card  Pravin Foster will be assisting me in scheduling this for the patient once proof is received  Prachi Wilde stated she will be bringing that in either today or tomorrow  Thank you

## 2021-05-26 ENCOUNTER — PATIENT OUTREACH (OUTPATIENT)
Dept: INTERNAL MEDICINE CLINIC | Facility: CLINIC | Age: 81
End: 2021-05-26

## 2021-05-26 NOTE — PROGRESS NOTES
Waiver:   Patient has been enrolled in the [facility] Per April Keri at [place]  Pt is aware that [facility] will be calling her  Pt has been assigned a  Waylon Diamond [phone]  This information has been given to patient  All Goals have been satisfied by [employer], Waiver  Pt is aware I will be closing case now  Pt is aware if she needs new or existing resources she can contact the team or clinic

## 2021-05-26 NOTE — PROGRESS NOTES
Called patient to discuss continued fevers and elevated blood pressure  There was no answer and I was unable to leave a message  Will try again later  native artery, Diabetes mellitus ([address] Gall stone, H/O sinus bradycardia, Hyperlipidemia LDL goal < 70, Hypertension, Hypothyroidism (acquired), Obesity (BMI 80-40.9), and Umbilical hernia. Past Surgical History:  has a past surgical history that includes Cardiac surgery; Breast surgery; Coronary angioplasty with stent; Coronary artery bypass graft; Cholecystectomy (3/10/15); hernia repair (3/10/15); Hysterectomy; blanca Corbin Zhu     spoke with Dr. Rayne Perdue (Medicine). Discussed case. They will admit this patient.     Critical Care: none        This patient's ED course included: a personal history and physicial eaxmination    This patient has remained hemodynamically stable during their ED course. Counseling: The emergency provider has spoken with the patient and discussed todays results, in addition to providing specific details for the plan of care and counseling regarding the diagnosis and prognosis. Questions are answered at this time and they are agreeable with the plan.       --------------------------------- IMPRESSION AND DISPOSITION ---------------------------------    IMPRESSION  1. Chest pain, unspecified type    2. Shortness of breath    3. Elevated troponin        DISPOSITION  Disposition: Admit to telemetry  Patient condition is stable        NOTE: This report was transcribed using voice recognition software.  Every effort was made to ensure accuracy; however, inadvertent computerized transcription errors may be present         TOMAS Yuen CNP  05/25/21 1062

## 2021-05-27 ENCOUNTER — IMMUNIZATIONS (OUTPATIENT)
Dept: FAMILY MEDICINE CLINIC | Facility: HOSPITAL | Age: 81
End: 2021-05-27

## 2021-05-27 DIAGNOSIS — Z23 ENCOUNTER FOR IMMUNIZATION: Primary | ICD-10-CM

## 2021-05-27 PROCEDURE — 91301 SARS-COV-2 / COVID-19 MRNA VACCINE (MODERNA) 100 MCG: CPT

## 2021-05-27 PROCEDURE — 0012A SARS-COV-2 / COVID-19 MRNA VACCINE (MODERNA) 100 MCG: CPT

## 2021-05-28 ENCOUNTER — PATIENT OUTREACH (OUTPATIENT)
Dept: INTERNAL MEDICINE CLINIC | Facility: CLINIC | Age: 81
End: 2021-05-28

## 2021-05-28 NOTE — PROGRESS NOTES
Update received from Mariah Ariza our CHW that all goals have been met  Pt is approved for the PA Waiver  Her  is Jackqueline Cushing through [facility]  Pt has been approved for [facility] and all medical trips covered by [facility]  Pt aware to f/u with SW if needed  Please re-consult SW as needed

## 2021-06-10 ENCOUNTER — HOSPITAL ENCOUNTER (OUTPATIENT)
Dept: NON INVASIVE DIAGNOSTICS | Facility: CLINIC | Age: 81
Discharge: HOME | End: 2021-06-10
Payer: COMMERCIAL

## 2021-06-10 DIAGNOSIS — R06.00 DOE (DYSPNEA ON EXERTION): ICD-10-CM

## 2021-06-10 LAB
CHEST PAIN STATEMENT: NORMAL
MAX DIASTOLIC BP: 70 MMHG
MAX HEART RATE: 86 BPM
MAX PREDICTED HEART RATE: 140 BPM
MAX. SYSTOLIC BP: 164 MMHG
PROTOCOL NAME: NORMAL
REASON FOR TERMINATION: NORMAL
TARGET HR FORMULA: NORMAL
TEST INDICATION: NORMAL
TIME IN EXERCISE PHASE: NORMAL

## 2021-06-10 PROCEDURE — G1004 CDSM NDSC: HCPCS

## 2021-06-10 PROCEDURE — 78452 HT MUSCLE IMAGE SPECT MULT: CPT | Performed by: INTERNAL MEDICINE

## 2021-06-10 PROCEDURE — 93017 CV STRESS TEST TRACING ONLY: CPT

## 2021-06-10 PROCEDURE — 93016 CV STRESS TEST SUPVJ ONLY: CPT | Performed by: INTERNAL MEDICINE

## 2021-06-10 PROCEDURE — 78452 HT MUSCLE IMAGE SPECT MULT: CPT

## 2021-06-10 PROCEDURE — 93018 CV STRESS TEST I&R ONLY: CPT | Performed by: INTERNAL MEDICINE

## 2021-06-10 PROCEDURE — 93306 TTE W/DOPPLER COMPLETE: CPT | Performed by: INTERNAL MEDICINE

## 2021-06-10 PROCEDURE — A9502 TC99M TETROFOSMIN: HCPCS

## 2021-06-10 PROCEDURE — 93306 TTE W/DOPPLER COMPLETE: CPT

## 2021-06-10 RX ADMIN — REGADENOSON 0.4 MG: 0.08 INJECTION, SOLUTION INTRAVENOUS at 12:10

## 2021-06-21 ENCOUNTER — VBI (OUTPATIENT)
Dept: ADMINISTRATIVE | Facility: OTHER | Age: 81
End: 2021-06-21

## 2021-06-21 ENCOUNTER — CLINICAL SUPPORT (OUTPATIENT)
Dept: INTERNAL MEDICINE CLINIC | Facility: CLINIC | Age: 81
End: 2021-06-21

## 2021-06-21 DIAGNOSIS — E53.8 B12 DEFICIENCY: Chronic | ICD-10-CM

## 2021-06-21 PROCEDURE — 96372 THER/PROPH/DIAG INJ SC/IM: CPT | Performed by: INTERNAL MEDICINE

## 2021-06-21 RX ADMIN — CYANOCOBALAMIN 1000 MCG: 1000 INJECTION INTRAMUSCULAR; SUBCUTANEOUS at 09:46

## 2021-06-21 NOTE — PROGRESS NOTES
Patient came in office today for her b12 injection  Administered 1 mL in right deltoid  Patient supplied medication  Patient tolerated well

## 2021-06-28 ENCOUNTER — PREP FOR PROCEDURE (OUTPATIENT)
Dept: GASTROENTEROLOGY | Facility: CLINIC | Age: 81
End: 2021-06-28

## 2021-06-28 ENCOUNTER — TELEPHONE (OUTPATIENT)
Dept: GASTROENTEROLOGY | Facility: CLINIC | Age: 81
End: 2021-06-28

## 2021-06-28 DIAGNOSIS — D36.9 TUBULAR ADENOMA: Primary | ICD-10-CM

## 2021-06-28 DIAGNOSIS — K63.5 POLYP OF COLON, UNSPECIFIED PART OF COLON, UNSPECIFIED TYPE: Primary | ICD-10-CM

## 2021-06-28 RX ORDER — SODIUM, POTASSIUM,MAG SULFATES 17.5-3.13G
SOLUTION, RECONSTITUTED, ORAL ORAL
Qty: 1 ML | Refills: 0 | Status: SHIPPED | COMMUNITY
Start: 2021-06-28 | End: 2021-09-20 | Stop reason: ALTCHOICE

## 2021-06-28 NOTE — TELEPHONE ENCOUNTER
Our mutual patient is scheduled for procedure: Colon    On:9/20/21    With: Dr Agustín Paul  She is taking the following blood thinner: Xarelto    Can this be stopped ___2___ days prior to the procedure?       Physician Approving clearance: ________________________

## 2021-07-21 ENCOUNTER — CLINICAL SUPPORT (OUTPATIENT)
Dept: INTERNAL MEDICINE CLINIC | Facility: CLINIC | Age: 81
End: 2021-07-21

## 2021-07-21 DIAGNOSIS — E53.8 B12 DEFICIENCY: Chronic | ICD-10-CM

## 2021-07-21 PROCEDURE — 96372 THER/PROPH/DIAG INJ SC/IM: CPT | Performed by: INTERNAL MEDICINE

## 2021-07-21 RX ADMIN — CYANOCOBALAMIN 1000 MCG: 1000 INJECTION INTRAMUSCULAR; SUBCUTANEOUS at 09:21

## 2021-07-21 NOTE — PROGRESS NOTES
Patient came into the office for a nurse visit for Vit  B12, patient provided the serum and 1 0 ML was given in left deltoid   She will return in one month for next dose

## 2021-08-03 ENCOUNTER — OFFICE VISIT (OUTPATIENT)
Dept: PODIATRY | Facility: CLINIC | Age: 81
End: 2021-08-03
Payer: COMMERCIAL

## 2021-08-03 ENCOUNTER — TELEPHONE (OUTPATIENT)
Dept: CARDIOLOGY CLINIC | Facility: CLINIC | Age: 81
End: 2021-08-03

## 2021-08-03 VITALS
DIASTOLIC BLOOD PRESSURE: 77 MMHG | HEIGHT: 62 IN | WEIGHT: 134 LBS | BODY MASS INDEX: 24.66 KG/M2 | HEART RATE: 89 BPM | SYSTOLIC BLOOD PRESSURE: 168 MMHG

## 2021-08-03 DIAGNOSIS — B35.1 ONYCHOMYCOSIS: Primary | ICD-10-CM

## 2021-08-03 PROCEDURE — 11720 DEBRIDE NAIL 1-5: CPT | Performed by: PODIATRIST

## 2021-08-03 NOTE — TELEPHONE ENCOUNTER
Patient called  Concerned about her BP being elevated, fatigue, SOB  Today was 145/80  Previously SBP was running in the 170's  Also c/o 2 episodes of hemoptysis recently, has one this morning and is on Xarelto 15mg daily daily  She denies any edema or weight gain  HISTORY:    She states she had her first COVID injection in February and had fevers for 1 5 months which are now gone  She presented to ER in March with hypertension and fatigue  COVID negative  She states her PCP passed away and now has no family doctor  She is mostly [place] speaking  Should I set her up to see Dr Cal Davis or PA? And in relation to the hemoptysis, she does have a cough at times, should I set her up with pulmonary or is she able to d/c Xarelto for a few days? Thinking she needs to be seen  Need some assitance with this one for sure  Thanks

## 2021-08-03 NOTE — PROGRESS NOTES
Patient presents for palliative toenail care  Each great toenail is thickened yellow with subungual debris  Treatment consisted of debridement of each toenail reducing nail in thickness and removing devitalized tissue and debris  All other remaining long nails were trimmed  Pedal pulses are within normal limits

## 2021-08-06 DIAGNOSIS — I10 ESSENTIAL HYPERTENSION: ICD-10-CM

## 2021-08-06 DIAGNOSIS — R60.0 BILATERAL LOWER EXTREMITY EDEMA: ICD-10-CM

## 2021-08-06 DIAGNOSIS — R04.2 COUGH WITH HEMOPTYSIS: ICD-10-CM

## 2021-08-06 DIAGNOSIS — I48.20 CHRONIC ATRIAL FIBRILLATION (HCC): ICD-10-CM

## 2021-08-06 DIAGNOSIS — R06.00 DOE (DYSPNEA ON EXERTION): Primary | ICD-10-CM

## 2021-08-06 NOTE — TELEPHONE ENCOUNTER
I spoke with patient today  BP's:  In the AM , 137 and 131  diastolics in the 27'R  Few hours later about the same as above  Around 2 PM:  's and 150's  Diastolic 91'Y  Could not get HR from patient  Past 2 days has not coughed up any blood  Now stating that her ankles are swollen,  Now that she looked at them since we spoke  Do you still want to increase Bystolic? I spoke with Meggan and we can add her to Meggan's schedule on Tuesday while you are in the office  I can instruct her to get her CXR and labs on Monday  Patient's last nuclear stress test and echo were in June 2021  For now I told patient to remain on all meds

## 2021-08-06 NOTE — TELEPHONE ENCOUNTER
Spoke with patient  She will have CXR and labs on Monday and will come to office to see Meggan on Tuesday at 12:00  Orders placed

## 2021-08-09 ENCOUNTER — HOSPITAL ENCOUNTER (OUTPATIENT)
Dept: RADIOLOGY | Facility: HOSPITAL | Age: 81
Discharge: HOME | End: 2021-08-09
Attending: INTERNAL MEDICINE
Payer: COMMERCIAL

## 2021-08-09 ENCOUNTER — APPOINTMENT (OUTPATIENT)
Dept: LAB | Facility: HOSPITAL | Age: 81
End: 2021-08-09
Attending: INTERNAL MEDICINE
Payer: COMMERCIAL

## 2021-08-09 DIAGNOSIS — R04.2 COUGH WITH HEMOPTYSIS: ICD-10-CM

## 2021-08-09 DIAGNOSIS — I10 ESSENTIAL HYPERTENSION: ICD-10-CM

## 2021-08-09 DIAGNOSIS — I48.20 CHRONIC ATRIAL FIBRILLATION (HCC): ICD-10-CM

## 2021-08-09 DIAGNOSIS — R06.00 DOE (DYSPNEA ON EXERTION): ICD-10-CM

## 2021-08-09 DIAGNOSIS — R60.0 BILATERAL LOWER EXTREMITY EDEMA: ICD-10-CM

## 2021-08-09 LAB
ALBUMIN SERPL BCP-MCNC: 3.6 G/DL (ref 3.5–5)
ALP SERPL-CCNC: 73 U/L (ref 46–116)
ALT SERPL W P-5'-P-CCNC: 14 U/L (ref 12–78)
ANION GAP SERPL CALCULATED.3IONS-SCNC: 5 MMOL/L (ref 4–13)
AST SERPL W P-5'-P-CCNC: 13 U/L (ref 5–45)
BASOPHILS # BLD AUTO: 0.04 THOUSANDS/ΜL (ref 0–0.1)
BASOPHILS NFR BLD AUTO: 1 % (ref 0–1)
BILIRUB SERPL-MCNC: 0.5 MG/DL (ref 0.2–1)
BUN SERPL-MCNC: 17 MG/DL (ref 5–25)
CALCIUM SERPL-MCNC: 9.3 MG/DL (ref 8.3–10.1)
CHLORIDE SERPL-SCNC: 108 MMOL/L (ref 100–108)
CO2 SERPL-SCNC: 25 MMOL/L (ref 21–32)
CREAT SERPL-MCNC: 0.98 MG/DL (ref 0.6–1.3)
EOSINOPHIL # BLD AUTO: 0.07 THOUSAND/ΜL (ref 0–0.61)
EOSINOPHIL NFR BLD AUTO: 1 % (ref 0–6)
ERYTHROCYTE [DISTWIDTH] IN BLOOD BY AUTOMATED COUNT: 13.7 % (ref 11.6–15.1)
GFR SERPL CREATININE-BSD FRML MDRD: 54 ML/MIN/1.73SQ M
GLUCOSE P FAST SERPL-MCNC: 81 MG/DL (ref 65–99)
HCT VFR BLD AUTO: 39.1 % (ref 34.8–46.1)
HGB BLD-MCNC: 12.6 G/DL (ref 11.5–15.4)
IMM GRANULOCYTES # BLD AUTO: 0.02 THOUSAND/UL (ref 0–0.2)
IMM GRANULOCYTES NFR BLD AUTO: 0 % (ref 0–2)
LYMPHOCYTES # BLD AUTO: 1.38 THOUSANDS/ΜL (ref 0.6–4.47)
LYMPHOCYTES NFR BLD AUTO: 22 % (ref 14–44)
MCH RBC QN AUTO: 30.7 PG (ref 26.8–34.3)
MCHC RBC AUTO-ENTMCNC: 32.2 G/DL (ref 31.4–37.4)
MCV RBC AUTO: 95 FL (ref 82–98)
MONOCYTES # BLD AUTO: 0.55 THOUSAND/ΜL (ref 0.17–1.22)
MONOCYTES NFR BLD AUTO: 9 % (ref 4–12)
NEUTROPHILS # BLD AUTO: 4.37 THOUSANDS/ΜL (ref 1.85–7.62)
NEUTS SEG NFR BLD AUTO: 67 % (ref 43–75)
NRBC BLD AUTO-RTO: 0 /100 WBCS
NT-PROBNP SERPL-MCNC: 778 PG/ML
PLATELET # BLD AUTO: 205 THOUSANDS/UL (ref 149–390)
PMV BLD AUTO: 11.1 FL (ref 8.9–12.7)
POTASSIUM SERPL-SCNC: 4.1 MMOL/L (ref 3.5–5.3)
PROT SERPL-MCNC: 8.1 G/DL (ref 6.4–8.2)
RBC # BLD AUTO: 4.1 MILLION/UL (ref 3.81–5.12)
SODIUM SERPL-SCNC: 138 MMOL/L (ref 136–145)
WBC # BLD AUTO: 6.43 THOUSAND/UL (ref 4.31–10.16)

## 2021-08-09 PROCEDURE — 83880 ASSAY OF NATRIURETIC PEPTIDE: CPT

## 2021-08-09 PROCEDURE — 80053 COMPREHEN METABOLIC PANEL: CPT

## 2021-08-09 PROCEDURE — 85025 COMPLETE CBC W/AUTO DIFF WBC: CPT

## 2021-08-09 PROCEDURE — 36415 COLL VENOUS BLD VENIPUNCTURE: CPT

## 2021-08-09 PROCEDURE — 71046 X-RAY EXAM CHEST 2 VIEWS: CPT

## 2021-08-10 ENCOUNTER — OFFICE VISIT (OUTPATIENT)
Dept: CARDIOLOGY CLINIC | Facility: CLINIC | Age: 81
End: 2021-08-10
Payer: COMMERCIAL

## 2021-08-10 VITALS
BODY MASS INDEX: 24.69 KG/M2 | HEIGHT: 62 IN | WEIGHT: 134.2 LBS | HEART RATE: 79 BPM | DIASTOLIC BLOOD PRESSURE: 76 MMHG | SYSTOLIC BLOOD PRESSURE: 158 MMHG

## 2021-08-10 DIAGNOSIS — I10 ESSENTIAL HYPERTENSION: Chronic | ICD-10-CM

## 2021-08-10 DIAGNOSIS — I48.20 CHRONIC ATRIAL FIBRILLATION (HCC): Primary | ICD-10-CM

## 2021-08-10 DIAGNOSIS — R94.39 ABNORMAL STRESS TEST: ICD-10-CM

## 2021-08-10 DIAGNOSIS — Z95.0 PRESENCE OF CARDIAC PACEMAKER: Chronic | ICD-10-CM

## 2021-08-10 DIAGNOSIS — I10 HYPERTENSION, UNSPECIFIED TYPE: ICD-10-CM

## 2021-08-10 PROCEDURE — 99215 OFFICE O/P EST HI 40 MIN: CPT | Performed by: PHYSICIAN ASSISTANT

## 2021-08-10 PROCEDURE — 93000 ELECTROCARDIOGRAM COMPLETE: CPT | Performed by: PHYSICIAN ASSISTANT

## 2021-08-10 RX ORDER — NEBIVOLOL 5 MG/1
5 TABLET ORAL 2 TIMES DAILY
Qty: 90 TABLET | Refills: 3 | Status: SHIPPED | OUTPATIENT
Start: 2021-08-10 | End: 2021-12-14

## 2021-08-10 NOTE — H&P (VIEW-ONLY)
Electrophysiology Office Visit  [address] Cardiology [facility]  [address] [facility], [place], [address]    Name: Geri Vicente  : 1940  MRN: [id number]    ASSESSMENT:  1  Chronic atrial fibrillation ([address] )     2  Hypertension, unspecified type  nebivolol (BYSTOLIC) 5 mg tablet    POCT ECG   3  Essential hypertension     4  Presence of cardiac pacemaker         PLAN:  1  Abnormal stress test    - change from no perfusion defects 2018 to new evidence of myocardial ischemia in apical anterior region 2021  2  Exertional dyspnea  3  Persistent atrial fibrillation  - anticoagulated with low dose Xarelto / [id number] score of 4 (age, sex, HTN)  - EF of 60% per echo 2021 / LA diameter of 5 41cm  - rate control: Bystolic 5mg (now increased to) twice daily   - antiarrhythmic therapy: none  4  [employer] single-chamber pacemaker in situ, right-sided  - 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  - prior generator change 3/15/2018  - history of left sided infection with erosion of pacemaker  5  Essential hypertension  - maintained on losartan 100mg daily, furosemide 20mg daily, verapamil 120mg daily, and Bystolic   6  Hyperlipidemia  - maintained on rosuvastatin 5mg daily   7  Tubulovillous adenoma  8  CKD stage 3 with left renal artery stenosis   9  Episode of hemoptysis    IMPRESSION:  1  Dyspnea - Patient reports today exertional dyspnea and fatigue and she had an abnormal stress test - recommendation at this time would be to undergo cardiac catheterization (had considered CTA but patient does have afib that is not always well rate controlled which would be required for accuracy of the scan)  The procedure and possible outcomes were discussed with the patient and she is agreeable to undergo cath  Will have our schedulers reach out to her to set this up       2  BP - In the interim, will have patient increase her Bystolic to 5mg twice daily to aid in better blood pressure management and better rate control  3  Afib - Patient is in afib with no significant burden of RVR but could benefit from better rate control as seen above  As she had an isolated episode of hemoptysis, I have asked her to let our office know if it happens again  Will continue low dose Xarelto  No overt pulmonary disease seen on chest x-ray either  4  Pacemaker - patient does pace about 50% of the time and does have a relatively wide QRS complex but her pacing burden has not changed drastically recently  Could consider this and examine further if cath is negative and patient has no relief in her symptoms  Patient is to follow up in our EP office in 2 months  She is to call our office with any further questions or concerns in the meantime  HPI:   Interim history: Jacklyn Hamilton is a 80 y o  female with  Persistent atrial fibrillation anticoagulated with low-dose Xarelto, sick sinus syndrome status post pacemaker, essential hypertension, hyperlipidemia, history of tubulovillous adenoma, CKD stage 3 with left renal artery stenosis  She presents today for office visit  EP History:  1  Established with [facility] 2/2018  2  Underwent generator change 3/2018 (right sided due to prior pacer erosion)  3  Issues with BP in 2019 surrounding losartan  4  EF remains preserved 11/2019 (prior nuclear stress 1/2018 showing no perfusion defects)  5  Complaints of SOB and fatigue - repeat echo and stress    Patient follows with Dr Georgiana Morales with the EP history as reported above  Ms Ayah Case requested an appointment for a couple cardiac issues  She reports that her blood pressures have been all over the place  She checks them and sometimes they are high and they are on the lower side  However, she denies any symptoms headache, lightheadedness, or dizziness with either the high with low readings  She is just cognizant of the numbers not being consistent        She reports that she has also had swelling in her legs for 2 weeks  She has been taking her water pill as directed  Her BNP that was done yesterday was only mildly elevated and chest x-ray showing no evidence of effusions  She also complains of shortness of breath and fatigue walking and especially on incline  She has to stop frequently  She believes that this all began when she got her vaccination as she had 2 months of fever afterwards  She even did wind up in the ER for hypertension and fever but she was not positive for COVID and was sent home  She also reports that 2 weeks ago around the same time of the swelling that she did have a nose bleed at subsequently she developed a cough and coughed up blood and a clot  Since then, she has had no issues with this  In terms of her family's cardiac history, she reports that on her father's side everyone  of heart conditions but is unclear exactly which kind of cardiac issues they had  DEVICE:          EKG: V-paced rhythm with underlying atrial fibrillation at 75 beats per minute    ROS:   Review of Systems   Constitutional: Positive for malaise/fatigue  Negative for chills, diaphoresis and fever  Cardiovascular: Positive for dyspnea on exertion and leg swelling  Negative for chest pain, claudication, cyanosis, irregular heartbeat, near-syncope, orthopnea, palpitations, paroxysmal nocturnal dyspnea and syncope  Respiratory: Positive for shortness of breath  Negative for sleep disturbances due to breathing  Neurological: Negative for dizziness and light-headedness  All other systems reviewed and are negative  OBJECTIVE:   Vitals:   /76 (BP Location: Left arm, Patient Position: Sitting, Cuff Size: Adult)   Pulse 79   Ht 5' 2" (1 575 m)   Wt 60 9 kg (134 lb 3 2 oz)   BMI 24 55 kg/m²       Physical Exam:   Physical Exam  Vitals and nursing note reviewed  Constitutional:       General: She is not in acute distress  Appearance: Normal appearance   She is well-developed  She is not diaphoretic  HENT:      Head: Normocephalic and atraumatic  Eyes:      Pupils: Pupils are equal, round, and reactive to light  Neck:      Vascular: No JVD  Cardiovascular:      Rate and Rhythm: Normal rate and regular rhythm  Heart sounds: No murmur heard  No friction rub  No gallop  Pulmonary:      Effort: Pulmonary effort is normal  No respiratory distress  Breath sounds: Normal breath sounds  No wheezing  Abdominal:      Palpations: Abdomen is soft  Musculoskeletal:         General: Normal range of motion  Cervical back: Normal range of motion  Skin:     General: Skin is warm and dry  Comments: +1 pitting edema of lower extremities   Neurological:      Mental Status: She is alert and oriented to person, place, and time           Medications:      Current Outpatient Medications:     Blood Pressure Monitoring (Blood Pressure Cuff) MISC, Use every other day For HTN, Disp: 1 each, Rfl: 0    Cozaar 100 MG tablet, Take 1 tablet (100 mg total) by mouth daily, Disp: 90 tablet, Rfl: 3    cyanocobalamin 1,000 mcg/mL, Inject 1 mL (1,000 mcg total) into a muscle every 30 (thirty) days, Disp: 3 mL, Rfl: 3    furosemide (LASIX) 20 mg tablet, Take 1 tablet (20 mg total) by mouth daily, Disp: 90 tablet, Rfl: 3    loratadine (CLARITIN) 10 mg tablet, TAKE 1 TABLET (10 MG TOTAL) BY MOUTH DAILY, Disp: 30 tablet, Rfl: 2    losartan (COZAAR) 100 MG tablet, Take 1 tablet (100 mg total) by mouth daily, Disp: 90 tablet, Rfl: 0    Na Sulfate-K Sulfate-Mg Sulf (Suprep Bowel Prep Kit) 17 5-3 13-1 6 GM/177ML SOLN, Lot - [id number] Exp 9/2022, Disp: 1 mL, Rfl: 0    nebivolol (BYSTOLIC) 5 mg tablet, Take 1 tablet (5 mg total) by mouth 2 (two) times a day, Disp: 90 tablet, Rfl: 3    rivaroxaban (Xarelto) 15 mg tablet, Take 1 tablet (15 mg total) by mouth daily, Disp: 90 tablet, Rfl: 3    rosuvastatin (CRESTOR) 5 mg tablet, Take 1 tablet (5 mg total) by mouth daily, Disp: 90 tablet, Rfl: 3    spironolactone (ALDACTONE) 25 mg tablet, Take 1 tablet (25 mg total) by mouth daily, Disp: 90 tablet, Rfl: 3    verapamil (VERELAN PM) 120 MG 24 hr capsule, TOME JUAN ANTONIO CAPSULA POR VIA ORAL ANTES DE DORMIR EN LA NOCHE, Disp: 90 capsule, Rfl: 3    fluticasone (FLONASE) 50 mcg/act nasal spray, 1 spray into each nostril daily (Patient not taking: Reported on 4/28/2021), Disp: 1 Bottle, Rfl: 1    Current Facility-Administered Medications:     cyanocobalamin injection 1,000 mcg, 1,000 mcg, Intramuscular, Q30 Days, Corbin Barnes MD, 1,000 mcg at 07/21/21 8899     Family History   Problem Relation Age of Onset    Diabetes Mother     Breast cancer Sister 48    No Known Problems Father     No Known Problems Maternal Grandmother     No Known Problems Maternal Grandfather     No Known Problems Paternal Grandmother     No Known Problems Paternal Grandfather     No Known Problems Daughter     No Known Problems Son     No Known Problems Sister     No Known Problems Sister     No Known Problems Brother     No Known Problems Brother     No Known Problems Sister     No Known Problems Paternal Aunt     No Known Problems Paternal Aunt     No Known Problems Paternal Aunt     No Known Problems Paternal Aunt      Social History     Socioeconomic History    Marital status:       Spouse name: Not on file    Number of children: Not on file    Years of education: Not on file    Highest education level: Not on file   Occupational History    Occupation: retired   Tobacco Use    Smoking status: Never Smoker    Smokeless tobacco: Never Used   Vaping Use    Vaping Use: Never used   Substance and Sexual Activity    Alcohol use: No    Drug use: No    Sexual activity: Not Currently     Comment:    Other Topics Concern    Not on file   Social History Narrative    Housing, household, and economic circumstances      Social Determinants of Health     Financial Resource Strain: Medium Risk  Difficulty of Paying Living Expenses: Somewhat hard   Food Insecurity: No Food Insecurity    Worried About Running Out of Food in the Last Year: Never true    Ran Out of Food in the Last Year: Never true   Transportation Needs: No Transportation Needs    Lack of Transportation (Medical): No    Lack of Transportation (Non-Medical): No   Physical Activity: Inactive    Days of Exercise per Week: 0 days    Minutes of Exercise per Session: 0 min   Stress: No Stress Concern Present    Feeling of Stress : Not at all   Social Connections: Moderately Isolated    Frequency of Communication with Friends and Family: More than three times a week    Frequency of Social Gatherings with Friends and Family: More than three times a week    Attends Taoist Services: More than 4 times per year    Active Member of Clubs or Organizations: No    Attends Club or Organization Meetings: Never    Marital Status:    Intimate Partner Violence: Not At Risk    Fear of Current or Ex-Partner: No    Emotionally Abused: No    Physically Abused: No    Sexually Abused: No     Social History     Tobacco Use   Smoking Status Never Smoker   Smokeless Tobacco Never Used     Social History     Substance and Sexual Activity   Alcohol Use No       Labs & Results:  Below is the patient's most recent value for Albumin, ALT, AST, BUN, Calcium, Chloride, Cholesterol, CO2, Creatinine, GFR, Glucose, HDL, Hematocrit, Hemoglobin, Hemoglobin A1C, LDL, Magnesium, Phosphorus, Platelets, Potassium, PSA, Sodium, Triglycerides, and WBC     Lab Results   Component Value Date    ALT 14 08/09/2021    AST 13 08/09/2021    BUN 17 08/09/2021    CALCIUM 9 3 08/09/2021     08/09/2021    CHOL 143 11/25/2015    CO2 25 08/09/2021    CREATININE 0 98 08/09/2021    HDL 57 08/18/2020    HCT 39 1 08/09/2021    HGB 12 6 08/09/2021    HGBA1C 5 6 06/18/2016    MG 2 1 03/15/2018     08/09/2021    K 4 1 08/09/2021     11/25/2015    TRIG 108 2020    WBC 6 43 2021     Note: for a comprehensive list of the patient's lab results, access the Results Review activity  CARDIAC TESTING:   ECHO:   Results for orders placed during the hospital encounter of 06/10/21    Echo complete with contrast if indicated    Narrative  [address]  [facility], [address] [facility]  [phone]    Transthoracic Echocardiogram  2D, M-mode, Doppler, and Color Doppler    Study date:  10-Abundio-2021    Patient: Octavio Ferreira  MR number: [id number]  Account number: [de-identified]  : 1940  Age: [de-identified] years  Gender: Female  Status: Outpatient  Location: [address] [place] [address] [facility] [place]  Height: 62 in  Weight: 135 lb  BP: 137/ 97 mmHg    Indications: Dyspnea    Diagnoses: R06 00 - Dyspnea, unspecified    Sonographer:  BRANDON Melchor  Primary Physician:  Paul Tubbs DO  Referring Physician:  Elvi Dunlap PA-C  Group:  [facility]  Cardiology Fellow:  Salma Vogel MD  Interpreting Physician:  Charley Rudd MD    SUMMARY    LEFT VENTRICLE:  Systolic function was normal  Ejection fraction was estimated to be 60 %  There were no regional wall motion abnormalities  Wall thickness was mildly increased  There was mild concentric hypertrophy  LEFT ATRIUM:  The atrium was moderately to markedly dilated  RIGHT ATRIUM:  The atrium was mildly to moderately dilated  MITRAL VALVE:  There was mild annular calcification  There was mild regurgitation  AORTIC VALVE:  The valve was trileaflet  Leaflets exhibited moderate calcification, normal cuspal separation, and sclerosis  There was mild to moderate regurgitation  TRICUSPID VALVE:  There was mild to moderate regurgitation  Pulmonary artery systolic pressure was moderately increased  Estimated peak PA pressure was 50 mmHg  IVC, HEPATIC VEINS:  The inferior vena cava was dilated    Respirophasic changes were blunted (less than 50% variation)  HISTORY: PRIOR HISTORY: Hypertension, hyperlipidemia, atrial fibrillation, sick sinus syndrome, pacemaker    PROCEDURE: The study was performed in the [address]  This was a routine study  The transthoracic approach was used  The study included complete 2D imaging, M-mode, complete spectral Doppler, and color Doppler  Image  quality was adequate  LEFT VENTRICLE: Size was normal  Systolic function was normal  Ejection fraction was estimated to be 60 %  There were no regional wall motion abnormalities  Wall thickness was mildly increased  There was mild concentric hypertrophy  DOPPLER: Transmitral flow pattern consistent with atrial fibrillation with ventricular pacing  RIGHT VENTRICLE: The size was normal  Systolic function was normal  Wall thickness was normal  A pacemaker lead was present  LEFT ATRIUM: The atrium was moderately to markedly dilated  RIGHT ATRIUM: The atrium was mildly to moderately dilated  MITRAL VALVE: There was mild annular calcification  Valve structure was normal  There was normal leaflet separation  DOPPLER: The transmitral velocity was within the normal range  There was no evidence for stenosis  There was mild  regurgitation  AORTIC VALVE: The valve was trileaflet  Leaflets exhibited moderate calcification, normal cuspal separation, and sclerosis  DOPPLER: Transaortic velocity was within the normal range  There was no evidence for stenosis  There was mild to  moderate regurgitation  TRICUSPID VALVE: The valve structure was normal  There was normal leaflet separation  DOPPLER: The transtricuspid velocity was within the normal range  There was no evidence for stenosis  There was mild to moderate regurgitation  Pulmonary  artery systolic pressure was moderately increased  Estimated peak PA pressure was 50 mmHg  PULMONIC VALVE: Leaflets exhibited normal thickness, no calcification, and normal cuspal separation   DOPPLER: The transpulmonic velocity was within the normal range  There was trace regurgitation  PERICARDIUM: There was no pericardial effusion  The pericardium was normal in appearance  AORTA: The root exhibited normal size  SYSTEMIC VEINS: IVC: The inferior vena cava was dilated  Respirophasic changes were blunted (less than 50% variation)  SYSTEM MEASUREMENT TABLES    2D  %FS: 34 34 %  Ao Diam: 3 cm  EDV(Teich): 85 94 ml  EF(Teich): 63 63 %  ESV(Teich): 31 25 ml  IVSd: 1 07 cm  LA Area: 34 19 cm2  LA Diam: 5 41 cm  LVEDV MOD A4C: 103 42 ml  LVEF MOD A4C: 67 34 %  LVESV MOD A4C: 33 78 ml  LVIDd: 4 36 cm  LVIDs: 2 86 cm  LVLd A4C: 6 86 cm  LVLs A4C: 5 55 cm  LVPWd: 0 98 cm  RA Area: 22 87 cm2  RVIDd: 3 59 cm  SV MOD A4C: 69 64 ml  SV(Teich): 54 69 ml    CW  AR Dec Stark: 2 35 m/s2  AR Dec Time: 1715 76 ms  AR PHT: 497 57 ms  AR Vmax: 4 01 m/s  AR maxP 29 mmHg  TR Vmax: 2 88 m/s  TR maxP 27 mmHg    MM  TAPSE: 1 78 cm    [employer] Accredited Echocardiography Laboratory    Prepared and electronically signed by    Effie Duane, MD  Signed 10-Abundio-2021 15:06:42    No results found for this or any previous visit  CATH:  No results found for this or any previous visit  STRESS TEST:  No results found for this or any previous visit

## 2021-08-10 NOTE — PROGRESS NOTES
Electrophysiology Office Visit  [address] [facility] [place]  [address] [facility], [facility], [address]    Name: Abner Jean  : 1940  MRN: [id number]    ASSESSMENT:  1  Chronic atrial fibrillation ([address] )     2  Hypertension, unspecified type  nebivolol (BYSTOLIC) 5 mg tablet    POCT ECG   3  Essential hypertension     4  Presence of cardiac pacemaker         PLAN:  1  Abnormal stress test    - change from no perfusion defects 2018 to new evidence of myocardial ischemia in apical anterior region 2021  2  Exertional dyspnea  3  Persistent atrial fibrillation  - anticoagulated with low dose Xarelto / Ribka2Yuwz score of 4 (age, sex, HTN)  - EF of 60% per echo 2021 / LA diameter of 5 41cm  - rate control: Bystolic 5mg (now increased to) twice daily   - antiarrhythmic therapy: none  4  [employer]tronic single-chamber pacemaker in situ, right-sided  - 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  - prior generator change 3/15/2018  - history of left sided infection with erosion of pacemaker  5  Essential hypertension  - maintained on losartan 100mg daily, furosemide 20mg daily, verapamil 120mg daily, and Bystolic   6  Hyperlipidemia  - maintained on rosuvastatin 5mg daily   7  Tubulovillous adenoma  8  CKD stage 3 with left renal artery stenosis   9  Episode of hemoptysis    IMPRESSION:  1  Dyspnea - Patient reports today exertional dyspnea and fatigue and she had an abnormal stress test - recommendation at this time would be to undergo cardiac catheterization (had considered CTA but patient does have afib that is not always well rate controlled which would be required for accuracy of the scan)  The procedure and possible outcomes were discussed with the patient and she is agreeable to undergo cath  Will have our schedulers reach out to her to set this up       2  BP - In the interim, will have patient increase her Bystolic to 5mg twice daily to aid in better blood pressure management and better rate control  3  Afib - Patient is in afib with no significant burden of RVR but could benefit from better rate control as seen above  As she had an isolated episode of hemoptysis, I have asked her to let our office know if it happens again  Will continue low dose Xarelto  No overt pulmonary disease seen on chest x-ray either  4  Pacemaker - patient does pace about 50% of the time and does have a relatively wide QRS complex but her pacing burden has not changed drastically recently  Could consider this and examine further if cath is negative and patient has no relief in her symptoms  Patient is to follow up in our EP office in 2 months  She is to call our office with any further questions or concerns in the meantime  HPI:   Interim history: Fabian Fine is a 80 y o  female with  Persistent atrial fibrillation anticoagulated with low-dose Xarelto, sick sinus syndrome status post pacemaker, essential hypertension, hyperlipidemia, history of tubulovillous adenoma, CKD stage 3 with left renal artery stenosis  She presents today for office visit  EP History:  1  Established with [facility] 2/2018  2  Underwent generator change 3/2018 (right sided due to prior pacer erosion)  3  Issues with BP in 2019 surrounding losartan  4  EF remains preserved 11/2019 (prior nuclear stress 1/2018 showing no perfusion defects)  5  Complaints of SOB and fatigue - repeat echo and stress    Patient follows with Dr Ector Snyder with the EP history as reported above  Ms Ruby Doll requested an appointment for a couple cardiac issues  She reports that her blood pressures have been all over the place  She checks them and sometimes they are high and they are on the lower side  However, she denies any symptoms headache, lightheadedness, or dizziness with either the high with low readings  She is just cognizant of the numbers not being consistent        She reports that she has also had swelling in her legs for 2 weeks  She has been taking her water pill as directed  Her BNP that was done yesterday was only mildly elevated and chest x-ray showing no evidence of effusions  She also complains of shortness of breath and fatigue walking and especially on incline  She has to stop frequently  She believes that this all began when she got her vaccination as she had 2 months of fever afterwards  She even did wind up in the ER for hypertension and fever but she was not positive for COVID and was sent home  She also reports that 2 weeks ago around the same time of the swelling that she did have a nose bleed at subsequently she developed a cough and coughed up blood and a clot  Since then, she has had no issues with this  In terms of her family's cardiac history, she reports that on her father's side everyone  of heart conditions but is unclear exactly which kind of cardiac issues they had  DEVICE:          EKG: V-paced rhythm with underlying atrial fibrillation at 75 beats per minute    ROS:   Review of Systems   Constitutional: Positive for malaise/fatigue  Negative for chills, diaphoresis and fever  Cardiovascular: Positive for dyspnea on exertion and leg swelling  Negative for chest pain, claudication, cyanosis, irregular heartbeat, near-syncope, orthopnea, palpitations, paroxysmal nocturnal dyspnea and syncope  Respiratory: Positive for shortness of breath  Negative for sleep disturbances due to breathing  Neurological: Negative for dizziness and light-headedness  All other systems reviewed and are negative  OBJECTIVE:   Vitals:   /76 (BP Location: Left arm, Patient Position: Sitting, Cuff Size: Adult)   Pulse 79   Ht 5' 2" (1 575 m)   Wt 60 9 kg (134 lb 3 2 oz)   BMI 24 55 kg/m²       Physical Exam:   Physical Exam  Vitals and nursing note reviewed  Constitutional:       General: She is not in acute distress  Appearance: Normal appearance   She is well-developed  She is not diaphoretic  HENT:      Head: Normocephalic and atraumatic  Eyes:      Pupils: Pupils are equal, round, and reactive to light  Neck:      Vascular: No JVD  Cardiovascular:      Rate and Rhythm: Normal rate and regular rhythm  Heart sounds: No murmur heard  No friction rub  No gallop  Pulmonary:      Effort: Pulmonary effort is normal  No respiratory distress  Breath sounds: Normal breath sounds  No wheezing  Abdominal:      Palpations: Abdomen is soft  Musculoskeletal:         General: Normal range of motion  Cervical back: Normal range of motion  Skin:     General: Skin is warm and dry  Comments: +1 pitting edema of lower extremities   Neurological:      Mental Status: She is alert and oriented to person, place, and time           Medications:      Current Outpatient Medications:     Blood Pressure Monitoring (Blood Pressure Cuff) MISC, Use every other day For HTN, Disp: 1 each, Rfl: 0    Cozaar 100 MG tablet, Take 1 tablet (100 mg total) by mouth daily, Disp: 90 tablet, Rfl: 3    cyanocobalamin 1,000 mcg/mL, Inject 1 mL (1,000 mcg total) into a muscle every 30 (thirty) days, Disp: 3 mL, Rfl: 3    furosemide (LASIX) 20 mg tablet, Take 1 tablet (20 mg total) by mouth daily, Disp: 90 tablet, Rfl: 3    loratadine (CLARITIN) 10 mg tablet, TAKE 1 TABLET (10 MG TOTAL) BY MOUTH DAILY, Disp: 30 tablet, Rfl: 2    losartan (COZAAR) 100 MG tablet, Take 1 tablet (100 mg total) by mouth daily, Disp: 90 tablet, Rfl: 0    Na Sulfate-K Sulfate-Mg Sulf (Suprep Bowel Prep Kit) 17 5-3 13-1 6 GM/177ML SOLN, Lot - [id number] Exp 9/2022, Disp: 1 mL, Rfl: 0    nebivolol (BYSTOLIC) 5 mg tablet, Take 1 tablet (5 mg total) by mouth 2 (two) times a day, Disp: 90 tablet, Rfl: 3    rivaroxaban (Xarelto) 15 mg tablet, Take 1 tablet (15 mg total) by mouth daily, Disp: 90 tablet, Rfl: 3    rosuvastatin (CRESTOR) 5 mg tablet, Take 1 tablet (5 mg total) by mouth daily, Disp: 90 tablet, Rfl: 3    spironolactone (ALDACTONE) 25 mg tablet, Take 1 tablet (25 mg total) by mouth daily, Disp: 90 tablet, Rfl: 3    verapamil (VERELAN PM) 120 MG 24 hr capsule, TOME JUAN ANTONIO CAPSULA POR VIA ORAL ANTES DE DORMIR EN LA NOCHE, Disp: 90 capsule, Rfl: 3    fluticasone (FLONASE) 50 mcg/act nasal spray, 1 spray into each nostril daily (Patient not taking: Reported on 4/28/2021), Disp: 1 Bottle, Rfl: 1    Current Facility-Administered Medications:     cyanocobalamin injection 1,000 mcg, 1,000 mcg, Intramuscular, Q30 Days, Papo Pack MD, 1,000 mcg at 07/21/21 4061     Family History   Problem Relation Age of Onset    Diabetes Mother     Breast cancer Sister 48    No Known Problems Father     No Known Problems Maternal Grandmother     No Known Problems Maternal Grandfather     No Known Problems Paternal Grandmother     No Known Problems Paternal Grandfather     No Known Problems Daughter     No Known Problems Son     No Known Problems Sister     No Known Problems Sister     No Known Problems Brother     No Known Problems Brother     No Known Problems Sister     No Known Problems Paternal Aunt     No Known Problems Paternal Aunt     No Known Problems Paternal Aunt     No Known Problems Paternal Aunt      Social History     Socioeconomic History    Marital status:       Spouse name: Not on file    Number of children: Not on file    Years of education: Not on file    Highest education level: Not on file   Occupational History    Occupation: retired   Tobacco Use    Smoking status: Never Smoker    Smokeless tobacco: Never Used   Vaping Use    Vaping Use: Never used   Substance and Sexual Activity    Alcohol use: No    Drug use: No    Sexual activity: Not Currently     Comment:    Other Topics Concern    Not on file   Social History Narrative    Housing, household, and economic circumstances      Social Determinants of Health     Financial Resource Strain: Medium Risk  Difficulty of Paying Living Expenses: Somewhat hard   Food Insecurity: No Food Insecurity    Worried About Running Out of Food in the Last Year: Never true    Ran Out of Food in the Last Year: Never true   Transportation Needs: No Transportation Needs    Lack of Transportation (Medical): No    Lack of Transportation (Non-Medical): No   Physical Activity: Inactive    Days of Exercise per Week: 0 days    Minutes of Exercise per Session: 0 min   Stress: No Stress Concern Present    Feeling of Stress : Not at all   Social Connections: Moderately Isolated    Frequency of Communication with Friends and Family: More than three times a week    Frequency of Social Gatherings with Friends and Family: More than three times a week    Attends Islam Services: More than 4 times per year    Active Member of Clubs or Organizations: No    Attends Club or Organization Meetings: Never    Marital Status:    Intimate Partner Violence: Not At Risk    Fear of Current or Ex-Partner: No    Emotionally Abused: No    Physically Abused: No    Sexually Abused: No     Social History     Tobacco Use   Smoking Status Never Smoker   Smokeless Tobacco Never Used     Social History     Substance and Sexual Activity   Alcohol Use No       Labs & Results:  Below is the patient's most recent value for Albumin, ALT, AST, BUN, Calcium, Chloride, Cholesterol, CO2, Creatinine, GFR, Glucose, HDL, Hematocrit, Hemoglobin, Hemoglobin A1C, LDL, Magnesium, Phosphorus, Platelets, Potassium, PSA, Sodium, Triglycerides, and WBC     Lab Results   Component Value Date    ALT 14 08/09/2021    AST 13 08/09/2021    BUN 17 08/09/2021    CALCIUM 9 3 08/09/2021     08/09/2021    CHOL 143 11/25/2015    CO2 25 08/09/2021    CREATININE 0 98 08/09/2021    HDL 57 08/18/2020    HCT 39 1 08/09/2021    HGB 12 6 08/09/2021    HGBA1C 5 6 06/18/2016    MG 2 1 03/15/2018     08/09/2021    K 4 1 08/09/2021     11/25/2015    TRIG 108 2020    WBC 6 43 2021     Note: for a comprehensive list of the patient's lab results, access the Results Review activity  CARDIAC TESTING:   ECHO:   Results for orders placed during the hospital encounter of 06/10/21    Echo complete with contrast if indicated    Narrative  [address]  [facility], [address] [facility]  [phone]    Transthoracic Echocardiogram  2D, M-mode, Doppler, and Color Doppler    Study date:  10-Abundio-2021    Patient: Isak Gonzalez  MR number: [id number]  Account number: [de-identified]  : 1940  Age: [de-identified] years  Gender: Female  Status: Outpatient  Location: [address] [place] [address] [facility]  Height: 62 in  Weight: 135 lb  BP: 137/ 97 mmHg    Indications: Dyspnea    Diagnoses: R06 00 - Dyspnea, unspecified    Sonographer:  BRANDON Camp  Primary Physician:  Juanito Mullins DO  Referring Physician:  Chante Marino PA-C  Group:  [facility]  Cardiology Fellow:  Loco Castillo MD  Interpreting Physician:  Antwon Araiza MD    SUMMARY    LEFT VENTRICLE:  Systolic function was normal  Ejection fraction was estimated to be 60 %  There were no regional wall motion abnormalities  Wall thickness was mildly increased  There was mild concentric hypertrophy  LEFT ATRIUM:  The atrium was moderately to markedly dilated  RIGHT ATRIUM:  The atrium was mildly to moderately dilated  MITRAL VALVE:  There was mild annular calcification  There was mild regurgitation  AORTIC VALVE:  The valve was trileaflet  Leaflets exhibited moderate calcification, normal cuspal separation, and sclerosis  There was mild to moderate regurgitation  TRICUSPID VALVE:  There was mild to moderate regurgitation  Pulmonary artery systolic pressure was moderately increased  Estimated peak PA pressure was 50 mmHg  IVC, HEPATIC VEINS:  The inferior vena cava was dilated    Respirophasic changes were blunted (less than 50% variation)  HISTORY: PRIOR HISTORY: Hypertension, hyperlipidemia, atrial fibrillation, sick sinus syndrome, pacemaker    PROCEDURE: The study was performed in the [address]  This was a routine study  The transthoracic approach was used  The study included complete 2D imaging, M-mode, complete spectral Doppler, and color Doppler  Image  quality was adequate  LEFT VENTRICLE: Size was normal  Systolic function was normal  Ejection fraction was estimated to be 60 %  There were no regional wall motion abnormalities  Wall thickness was mildly increased  There was mild concentric hypertrophy  DOPPLER: Transmitral flow pattern consistent with atrial fibrillation with ventricular pacing  RIGHT VENTRICLE: The size was normal  Systolic function was normal  Wall thickness was normal  A pacemaker lead was present  LEFT ATRIUM: The atrium was moderately to markedly dilated  RIGHT ATRIUM: The atrium was mildly to moderately dilated  MITRAL VALVE: There was mild annular calcification  Valve structure was normal  There was normal leaflet separation  DOPPLER: The transmitral velocity was within the normal range  There was no evidence for stenosis  There was mild  regurgitation  AORTIC VALVE: The valve was trileaflet  Leaflets exhibited moderate calcification, normal cuspal separation, and sclerosis  DOPPLER: Transaortic velocity was within the normal range  There was no evidence for stenosis  There was mild to  moderate regurgitation  TRICUSPID VALVE: The valve structure was normal  There was normal leaflet separation  DOPPLER: The transtricuspid velocity was within the normal range  There was no evidence for stenosis  There was mild to moderate regurgitation  Pulmonary  artery systolic pressure was moderately increased  Estimated peak PA pressure was 50 mmHg  PULMONIC VALVE: Leaflets exhibited normal thickness, no calcification, and normal cuspal separation   DOPPLER: The transpulmonic velocity was within the normal range  There was trace regurgitation  PERICARDIUM: There was no pericardial effusion  The pericardium was normal in appearance  AORTA: The root exhibited normal size  SYSTEMIC VEINS: IVC: The inferior vena cava was dilated  Respirophasic changes were blunted (less than 50% variation)  SYSTEM MEASUREMENT TABLES    2D  %FS: 34 34 %  Ao Diam: 3 cm  EDV(Teich): 85 94 ml  EF(Teich): 63 63 %  ESV(Teich): 31 25 ml  IVSd: 1 07 cm  LA Area: 34 19 cm2  LA Diam: 5 41 cm  LVEDV MOD A4C: 103 42 ml  LVEF MOD A4C: 67 34 %  LVESV MOD A4C: 33 78 ml  LVIDd: 4 36 cm  LVIDs: 2 86 cm  LVLd A4C: 6 86 cm  LVLs A4C: 5 55 cm  LVPWd: 0 98 cm  RA Area: 22 87 cm2  RVIDd: 3 59 cm  SV MOD A4C: 69 64 ml  SV(Teich): 54 69 ml    CW  AR Dec Barron: 2 35 m/s2  AR Dec Time: 1715 76 ms  AR PHT: 497 57 ms  AR Vmax: 4 01 m/s  AR maxP 29 mmHg  TR Vmax: 2 88 m/s  TR maxP 27 mmHg    MM  TAPSE: 1 78 cm    [employer] Accredited Echocardiography Laboratory    Prepared and electronically signed by    Kaitlynn Balbuena MD  Signed 10-Abundio-2021 15:06:42    No results found for this or any previous visit  CATH:  No results found for this or any previous visit  STRESS TEST:  No results found for this or any previous visit

## 2021-08-11 ENCOUNTER — REMOTE DEVICE CLINIC VISIT (OUTPATIENT)
Dept: CARDIOLOGY CLINIC | Facility: CLINIC | Age: 81
End: 2021-08-11
Payer: COMMERCIAL

## 2021-08-11 ENCOUNTER — TELEPHONE (OUTPATIENT)
Dept: CARDIOLOGY CLINIC | Facility: CLINIC | Age: 81
End: 2021-08-11

## 2021-08-11 DIAGNOSIS — Z95.0 PRESENCE OF PERMANENT CARDIAC PACEMAKER: Primary | ICD-10-CM

## 2021-08-11 PROCEDURE — 93294 REM INTERROG EVL PM/LDLS PM: CPT | Performed by: INTERNAL MEDICINE

## 2021-08-11 PROCEDURE — 93296 REM INTERROG EVL PM/IDS: CPT | Performed by: INTERNAL MEDICINE

## 2021-08-11 NOTE — PROGRESS NOTES
MDT-SINGLE CHAMBER PPM / NOT MRI CONDITIONAL   CARELINK TRANSMISSION:  BATTERY VOLTAGE ADEQUATE (10 8 YR)    88 4% (>40%/VVIR 60 PPM)    ALL LEAD PARAMETERS WITHIN NORMAL LIMITS   NO HIGH RATE EPISODES   NORMAL DEVICE FUNCTION  Meagan Lima

## 2021-08-11 NOTE — TELEPHONE ENCOUNTER
----- Message from Augustus Cerda [place] sent at 8/10/2021  4:10 PM EDT -----  Regarding: Zuleika Mar,    I saw this Yi speaking patient today and talked to her about a cath to which she is agreeable  Can we please call her to set this up? I put in the order (due to an abnormal stress test) along with the blood work  I did want to see a lipid panel (I know it's not routinely done but she does need it) so I added it as well  If it's possible can we see if Dr Dimitrios Peña would be able to do it as she is Yi speaking?     Thank you,    Fani Persaud

## 2021-08-11 NOTE — TELEPHONE ENCOUNTER
Patient schedule for LHC at [facility] on 8/23/21 with Dr Cherylann Schirmer  Patient aware of general instructions, meds holds (Spironolactone and lasix- Hold the am of and Xarelto-Hold two days prior of the procedure) and labs require    Please Americo Gupta can you check for insurance approval

## 2021-08-12 NOTE — TELEPHONE ENCOUNTER
She does not need auth for her Cath [id number] or [id number] on 8/23/21 at [facility] at [facility] 8/12/21

## 2021-08-16 ENCOUNTER — APPOINTMENT (OUTPATIENT)
Dept: LAB | Facility: HOSPITAL | Age: 81
End: 2021-08-16
Payer: COMMERCIAL

## 2021-08-16 DIAGNOSIS — R94.39 ABNORMAL STRESS TEST: ICD-10-CM

## 2021-08-16 LAB
ANION GAP SERPL CALCULATED.3IONS-SCNC: 4 MMOL/L (ref 4–13)
BASOPHILS # BLD AUTO: 0.03 THOUSANDS/ΜL (ref 0–0.1)
BASOPHILS NFR BLD AUTO: 0 % (ref 0–1)
BUN SERPL-MCNC: 21 MG/DL (ref 5–25)
CALCIUM SERPL-MCNC: 9.1 MG/DL (ref 8.3–10.1)
CHLORIDE SERPL-SCNC: 109 MMOL/L (ref 100–108)
CHOLEST SERPL-MCNC: 149 MG/DL (ref 50–200)
CO2 SERPL-SCNC: 26 MMOL/L (ref 21–32)
CREAT SERPL-MCNC: 0.94 MG/DL (ref 0.6–1.3)
EOSINOPHIL # BLD AUTO: 0.09 THOUSAND/ΜL (ref 0–0.61)
EOSINOPHIL NFR BLD AUTO: 1 % (ref 0–6)
ERYTHROCYTE [DISTWIDTH] IN BLOOD BY AUTOMATED COUNT: 13.7 % (ref 11.6–15.1)
GFR SERPL CREATININE-BSD FRML MDRD: 57 ML/MIN/1.73SQ M
GLUCOSE P FAST SERPL-MCNC: 79 MG/DL (ref 65–99)
HCT VFR BLD AUTO: 39.3 % (ref 34.8–46.1)
HDLC SERPL-MCNC: 62 MG/DL
HGB BLD-MCNC: 12.5 G/DL (ref 11.5–15.4)
IMM GRANULOCYTES # BLD AUTO: 0.03 THOUSAND/UL (ref 0–0.2)
IMM GRANULOCYTES NFR BLD AUTO: 0 % (ref 0–2)
LDLC SERPL CALC-MCNC: 67 MG/DL (ref 0–100)
LYMPHOCYTES # BLD AUTO: 1.53 THOUSANDS/ΜL (ref 0.6–4.47)
LYMPHOCYTES NFR BLD AUTO: 21 % (ref 14–44)
MCH RBC QN AUTO: 30.5 PG (ref 26.8–34.3)
MCHC RBC AUTO-ENTMCNC: 31.8 G/DL (ref 31.4–37.4)
MCV RBC AUTO: 96 FL (ref 82–98)
MONOCYTES # BLD AUTO: 0.56 THOUSAND/ΜL (ref 0.17–1.22)
MONOCYTES NFR BLD AUTO: 8 % (ref 4–12)
NEUTROPHILS # BLD AUTO: 5.02 THOUSANDS/ΜL (ref 1.85–7.62)
NEUTS SEG NFR BLD AUTO: 70 % (ref 43–75)
NONHDLC SERPL-MCNC: 87 MG/DL
NRBC BLD AUTO-RTO: 0 /100 WBCS
PLATELET # BLD AUTO: 215 THOUSANDS/UL (ref 149–390)
PMV BLD AUTO: 11.4 FL (ref 8.9–12.7)
POTASSIUM SERPL-SCNC: 4 MMOL/L (ref 3.5–5.3)
RBC # BLD AUTO: 4.1 MILLION/UL (ref 3.81–5.12)
SODIUM SERPL-SCNC: 139 MMOL/L (ref 136–145)
TRIGL SERPL-MCNC: 101 MG/DL
WBC # BLD AUTO: 7.26 THOUSAND/UL (ref 4.31–10.16)

## 2021-08-16 PROCEDURE — 36415 COLL VENOUS BLD VENIPUNCTURE: CPT

## 2021-08-16 PROCEDURE — 80048 BASIC METABOLIC PNL TOTAL CA: CPT

## 2021-08-16 PROCEDURE — 85025 COMPLETE CBC W/AUTO DIFF WBC: CPT

## 2021-08-16 PROCEDURE — 80061 LIPID PANEL: CPT | Performed by: PHYSICIAN ASSISTANT

## 2021-08-20 ENCOUNTER — CLINICAL SUPPORT (OUTPATIENT)
Dept: INTERNAL MEDICINE CLINIC | Facility: CLINIC | Age: 81
End: 2021-08-20

## 2021-08-20 DIAGNOSIS — E53.8 B12 DEFICIENCY: Primary | ICD-10-CM

## 2021-08-20 PROBLEM — R94.39 ABNORMAL NUCLEAR STRESS TEST: Status: ACTIVE | Noted: 2021-08-20

## 2021-08-20 PROCEDURE — 96372 THER/PROPH/DIAG INJ SC/IM: CPT | Performed by: HOSPITALIST

## 2021-08-20 RX ADMIN — CYANOCOBALAMIN 1000 MCG: 1000 INJECTION INTRAMUSCULAR; SUBCUTANEOUS at 08:59

## 2021-08-20 NOTE — PROGRESS NOTES
Patient is here for a nurse visit for her B 12 injections  Administered 1 mL into left deltoid  Patient supplied serum   Patient tolerated well

## 2021-08-23 ENCOUNTER — HOSPITAL ENCOUNTER (OUTPATIENT)
Dept: NON INVASIVE DIAGNOSTICS | Facility: HOSPITAL | Age: 81
Discharge: HOME | End: 2021-08-23
Attending: INTERNAL MEDICINE | Admitting: INTERNAL MEDICINE
Payer: COMMERCIAL

## 2021-08-23 VITALS
DIASTOLIC BLOOD PRESSURE: 76 MMHG | RESPIRATION RATE: 18 BRPM | SYSTOLIC BLOOD PRESSURE: 138 MMHG | HEART RATE: 67 BPM | TEMPERATURE: 97.8 F

## 2021-08-23 DIAGNOSIS — R94.39 ABNORMAL STRESS TEST: ICD-10-CM

## 2021-08-23 DIAGNOSIS — I25.10 NONOBSTRUCTIVE ATHEROSCLEROSIS OF CORONARY ARTERY: Primary | ICD-10-CM

## 2021-08-23 LAB
ANION GAP SERPL CALCULATED.3IONS-SCNC: 5 MMOL/L (ref 4–13)
ATRIAL RATE: 64 BPM
BUN SERPL-MCNC: 19 MG/DL (ref 5–25)
CALCIUM SERPL-MCNC: 8.9 MG/DL (ref 8.3–10.1)
CHLORIDE SERPL-SCNC: 109 MMOL/L (ref 100–108)
CO2 SERPL-SCNC: 24 MMOL/L (ref 21–32)
CREAT SERPL-MCNC: 0.97 MG/DL (ref 0.6–1.3)
GFR SERPL CREATININE-BSD FRML MDRD: 55 ML/MIN/1.73SQ M
GLUCOSE P FAST SERPL-MCNC: 92 MG/DL (ref 65–99)
GLUCOSE SERPL-MCNC: 92 MG/DL (ref 65–140)
POTASSIUM SERPL-SCNC: 4 MMOL/L (ref 3.5–5.3)
QRS AXIS: -58 DEGREES
QRSD INTERVAL: 176 MS
QT INTERVAL: 484 MS
QTC INTERVAL: 499 MS
SODIUM SERPL-SCNC: 138 MMOL/L (ref 136–145)
T WAVE AXIS: 85 DEGREES
VENTRICULAR RATE: 64 BPM

## 2021-08-23 PROCEDURE — 93458 L HRT ARTERY/VENTRICLE ANGIO: CPT

## 2021-08-23 PROCEDURE — 80048 BASIC METABOLIC PNL TOTAL CA: CPT | Performed by: INTERNAL MEDICINE

## 2021-08-23 PROCEDURE — 93458 L HRT ARTERY/VENTRICLE ANGIO: CPT | Performed by: INTERNAL MEDICINE

## 2021-08-23 PROCEDURE — 99152 MOD SED SAME PHYS/QHP 5/>YRS: CPT | Performed by: INTERNAL MEDICINE

## 2021-08-23 PROCEDURE — C1769 GUIDE WIRE: HCPCS

## 2021-08-23 PROCEDURE — 99152 MOD SED SAME PHYS/QHP 5/>YRS: CPT

## 2021-08-23 PROCEDURE — C1894 INTRO/SHEATH, NON-LASER: HCPCS

## 2021-08-23 PROCEDURE — 93005 ELECTROCARDIOGRAM TRACING: CPT

## 2021-08-23 PROCEDURE — 93010 ELECTROCARDIOGRAM REPORT: CPT | Performed by: INTERNAL MEDICINE

## 2021-08-23 RX ORDER — FENTANYL CITRATE 50 UG/ML
INJECTION, SOLUTION INTRAMUSCULAR; INTRAVENOUS CODE/TRAUMA/SEDATION MEDICATION
Status: COMPLETED | OUTPATIENT
Start: 2021-08-23 | End: 2021-08-23

## 2021-08-23 RX ORDER — ACETAMINOPHEN 325 MG/1
650 TABLET ORAL EVERY 4 HOURS PRN
Status: DISCONTINUED | OUTPATIENT
Start: 2021-08-23 | End: 2021-08-23 | Stop reason: HOSPADM

## 2021-08-23 RX ORDER — LIDOCAINE HYDROCHLORIDE 10 MG/ML
INJECTION, SOLUTION EPIDURAL; INFILTRATION; INTRACAUDAL; PERINEURAL CODE/TRAUMA/SEDATION MEDICATION
Status: COMPLETED | OUTPATIENT
Start: 2021-08-23 | End: 2021-08-23

## 2021-08-23 RX ORDER — NITROGLYCERIN 20 MG/100ML
INJECTION INTRAVENOUS CODE/TRAUMA/SEDATION MEDICATION
Status: COMPLETED | OUTPATIENT
Start: 2021-08-23 | End: 2021-08-23

## 2021-08-23 RX ORDER — MIDAZOLAM HYDROCHLORIDE 2 MG/2ML
INJECTION, SOLUTION INTRAMUSCULAR; INTRAVENOUS CODE/TRAUMA/SEDATION MEDICATION
Status: COMPLETED | OUTPATIENT
Start: 2021-08-23 | End: 2021-08-23

## 2021-08-23 RX ORDER — ROSUVASTATIN CALCIUM 10 MG/1
10 TABLET, COATED ORAL DAILY
Qty: 30 TABLET | Refills: 0 | Status: SHIPPED | OUTPATIENT
Start: 2021-08-23 | End: 2021-10-19

## 2021-08-23 RX ORDER — SODIUM CHLORIDE 9 MG/ML
125 INJECTION, SOLUTION INTRAVENOUS CONTINUOUS
Status: DISCONTINUED | OUTPATIENT
Start: 2021-08-23 | End: 2021-08-23 | Stop reason: HOSPADM

## 2021-08-23 RX ORDER — ASPIRIN 81 MG/1
324 TABLET, CHEWABLE ORAL ONCE
Status: COMPLETED | OUTPATIENT
Start: 2021-08-23 | End: 2021-08-23

## 2021-08-23 RX ORDER — HEPARIN SODIUM 1000 [USP'U]/ML
INJECTION, SOLUTION INTRAVENOUS; SUBCUTANEOUS CODE/TRAUMA/SEDATION MEDICATION
Status: COMPLETED | OUTPATIENT
Start: 2021-08-23 | End: 2021-08-23

## 2021-08-23 RX ORDER — SODIUM CHLORIDE 9 MG/ML
75 INJECTION, SOLUTION INTRAVENOUS CONTINUOUS
Status: DISCONTINUED | OUTPATIENT
Start: 2021-08-23 | End: 2021-08-23

## 2021-08-23 RX ORDER — ISOSORBIDE MONONITRATE 30 MG/1
30 TABLET, EXTENDED RELEASE ORAL DAILY
Qty: 30 TABLET | Refills: 0 | Status: SHIPPED | OUTPATIENT
Start: 2021-08-23 | End: 2021-12-14

## 2021-08-23 RX ORDER — VERAPAMIL HYDROCHLORIDE 2.5 MG/ML
INJECTION, SOLUTION INTRAVENOUS CODE/TRAUMA/SEDATION MEDICATION
Status: COMPLETED | OUTPATIENT
Start: 2021-08-23 | End: 2021-08-23

## 2021-08-23 RX ORDER — ONDANSETRON 2 MG/ML
4 INJECTION INTRAMUSCULAR; INTRAVENOUS EVERY 6 HOURS PRN
Status: DISCONTINUED | OUTPATIENT
Start: 2021-08-23 | End: 2021-08-23 | Stop reason: HOSPADM

## 2021-08-23 RX ADMIN — IOHEXOL 70 ML: 350 INJECTION, SOLUTION INTRAVENOUS at 08:21

## 2021-08-23 RX ADMIN — VERAPAMIL HYDROCHLORIDE 5 MG: 2.5 INJECTION, SOLUTION INTRAVENOUS at 08:21

## 2021-08-23 RX ADMIN — ASPIRIN 324 MG: 81 TABLET, CHEWABLE ORAL at 06:50

## 2021-08-23 RX ADMIN — MIDAZOLAM 1 MG: 1 INJECTION INTRAMUSCULAR; INTRAVENOUS at 08:15

## 2021-08-23 RX ADMIN — LIDOCAINE HYDROCHLORIDE 1 ML: 10 INJECTION, SOLUTION EPIDURAL; INFILTRATION; INTRACAUDAL; PERINEURAL at 08:18

## 2021-08-23 RX ADMIN — HEPARIN SODIUM 4000 UNITS: 1000 INJECTION INTRAVENOUS; SUBCUTANEOUS at 08:21

## 2021-08-23 RX ADMIN — Medication 200 MCG: at 08:21

## 2021-08-23 RX ADMIN — FENTANYL CITRATE 50 MCG: 50 INJECTION INTRAMUSCULAR; INTRAVENOUS at 08:15

## 2021-08-23 RX ADMIN — SODIUM CHLORIDE 182.7 ML: 0.9 INJECTION, SOLUTION INTRAVENOUS at 06:51

## 2021-08-23 RX ADMIN — SODIUM CHLORIDE 75 ML/HR: 0.9 INJECTION, SOLUTION INTRAVENOUS at 06:51

## 2021-08-23 NOTE — INTERVAL H&P NOTE
/71 (BP Location: Left arm)   Temp 97 8 °F (36 6 °C) (Oral)   Resp 18    H&P reviewed  After examining the patient, I find no changed to the H&P since it had been written  Patient re-evaluated   Accept as history and physical     Eyal Wong MD/August 23, 2021/7:29 AM

## 2021-08-23 NOTE — DISCHARGE INSTRUCTIONS
1  Please see the post cardiac catheterization dishcarge instructions  No heavy lifting, greater than 10 lbs  or strenuous  activity for 48 hrs  2 Remove band aid tomorrow  Shower and wash area- wrist gently with soap and water- beginning tomorrow  Rinse and pat dry  Apply new water seal band aid  Repeat this process for 5 days  No powders, creams lotions or antibiotic ointments  for 5 days  No tub baths, hot tubs or swimming for 5 days  3  Please call our office ([phone]) if you have any fever, redness, swelling, discharge from your wrist access site  4 No driving for 1 day    Left Heart Catheterization   WHAT YOU NEED TO KNOW:   A left heart catheterization is a procedure to look at your heart and its arteries  You may need this procedure if you have chest pain, heart disease, or your heart is not working as it should  DISCHARGE INSTRUCTIONS:   Call 911 for any of the following:   · You have any of the following signs of a heart attack:      ? Squeezing, pressure, or pain in your chest     ? and  any of the following:     ? Discomfort or pain in your back, neck, jaw, stomach, or arm     ? Shortness of breath    ? Nausea or vomiting    ? Lightheadedness or a sudden cold sweat    · You have any of the following signs of a stroke:      ? Numbness or drooping on one side of your face     ? Weakness in an arm or leg    ? Confusion or difficulty speaking    ? Dizziness, a severe headache, or vision loss    Seek care immediately if:   · Your arm or leg feels warm, tender, and painful  It may look swollen and red  · The leg or arm used for your angiogram is numb, painful, or changes color  · The bruise at your catheter site gets bigger or becomes swollen  · Your wound does not stop bleeding even after you apply firm pressure for 15 minutes  · You have weakness in an arm or leg  · You become confused or have difficulty speaking      · You have dizziness, a severe headache, or vision loss     Contact your healthcare provider if:   · You have a fever  · Your catheter site is red, leaks pus, or smells bad  · You have increasing pain at your catheter site  · You have questions or concerns about your condition or care  Limit activity as directed:   · Avoid unnecessary stair climbing for 48 hours, if a catheter was put in your groin  · Do not place pressure on your arm, hand, or wrist, if the catheter was placed in your wrist  Avoid pushing, pulling, or heavy lifting with that arm  · If you need to cough, support the area where the catheter was inserted with your hand  · Ask your healthcare provider how long you need to limit movement and avoid certain activities  · You may feel like resting more after your procedure  Slowly start to do more each day  Rest when you feel it is needed  Keep your bandage clean and dry:  Ask your healthcare provider when you can bathe and shower  Do not take baths or go in pools or hot tubs  Check your site every day for signs of infection such as swelling, redness, or pus  Drink liquids as directed:  Liquids help flush the dye used for your procedure out of your body  Ask your healthcare provider how much liquid to drink each day, and which liquids to drink  Some foods, such as soup and fruit, also provide liquid  Limit alcohol:  Do not drink alcohol for 24 hours after your procedure  Then limit alcohol  Women should limit alcohol to 1 drink a day  Men should limit alcohol to 2 drinks a day  A drink of alcohol is 12 ounces of beer, 5 ounces of wine, or 1½ ounces of liquor  Do not smoke:  Nicotine and other chemicals in cigarettes and cigars can damage your blood vessels  Ask your healthcare provider for information if you currently smoke and need help to quit  E-cigarettes or smokeless tobacco still contain nicotine  Talk to your healthcare provider before you use these products     Follow up with your healthcare provider as directed: Write down your questions so you remember to ask them during your visits  © Copyright [employer] 2018 Information is for End User's use only and may not be sold, redistributed or otherwise used for commercial purposes  All illustrations and images included in CareNotes® are the copyrighted property of [employer]  or [employer]  The above information is an  only  It is not intended as medical advice for individual conditions or treatments  Talk to your doctor, nurse or pharmacist before following any medical regimen to see if it is safe and effective for you

## 2021-09-02 DIAGNOSIS — I48.20 CHRONIC ATRIAL FIBRILLATION (HCC): ICD-10-CM

## 2021-09-02 RX ORDER — FUROSEMIDE 20 MG/1
20 TABLET ORAL DAILY
Qty: 90 TABLET | Refills: 3 | Status: SHIPPED | OUTPATIENT
Start: 2021-09-02 | End: 2021-09-22 | Stop reason: ALTCHOICE

## 2021-09-17 ENCOUNTER — TELEPHONE (OUTPATIENT)
Dept: GASTROENTEROLOGY | Facility: AMBULARY SURGERY CENTER | Age: 81
End: 2021-09-17

## 2021-09-20 ENCOUNTER — ANESTHESIA EVENT (OUTPATIENT)
Dept: GASTROENTEROLOGY | Facility: AMBULARY SURGERY CENTER | Age: 81
End: 2021-09-20

## 2021-09-20 ENCOUNTER — ANESTHESIA (OUTPATIENT)
Dept: GASTROENTEROLOGY | Facility: AMBULARY SURGERY CENTER | Age: 81
End: 2021-09-20

## 2021-09-20 ENCOUNTER — HOSPITAL ENCOUNTER (OUTPATIENT)
Dept: GASTROENTEROLOGY | Facility: AMBULARY SURGERY CENTER | Age: 81
Setting detail: OUTPATIENT SURGERY
Discharge: HOME | End: 2021-09-20
Attending: INTERNAL MEDICINE | Admitting: INTERNAL MEDICINE
Payer: COMMERCIAL

## 2021-09-20 VITALS
RESPIRATION RATE: 16 BRPM | DIASTOLIC BLOOD PRESSURE: 70 MMHG | HEIGHT: 63 IN | WEIGHT: 129 LBS | HEART RATE: 68 BPM | OXYGEN SATURATION: 100 % | TEMPERATURE: 97.7 F | SYSTOLIC BLOOD PRESSURE: 164 MMHG | BODY MASS INDEX: 22.86 KG/M2

## 2021-09-20 DIAGNOSIS — K63.5 POLYP OF COLON, UNSPECIFIED PART OF COLON, UNSPECIFIED TYPE: ICD-10-CM

## 2021-09-20 PROCEDURE — 88305 TISSUE EXAM BY PATHOLOGIST: CPT | Performed by: PATHOLOGY

## 2021-09-20 PROCEDURE — 45385 COLONOSCOPY W/LESION REMOVAL: CPT | Performed by: INTERNAL MEDICINE

## 2021-09-20 RX ORDER — PROPOFOL 10 MG/ML
INJECTION, EMULSION INTRAVENOUS AS NEEDED
Status: DISCONTINUED | OUTPATIENT
Start: 2021-09-20 | End: 2021-09-20

## 2021-09-20 RX ORDER — SODIUM CHLORIDE, SODIUM LACTATE, POTASSIUM CHLORIDE, CALCIUM CHLORIDE 600; 310; 30; 20 MG/100ML; MG/100ML; MG/100ML; MG/100ML
INJECTION, SOLUTION INTRAVENOUS CONTINUOUS PRN
Status: DISCONTINUED | OUTPATIENT
Start: 2021-09-20 | End: 2021-09-20

## 2021-09-20 RX ORDER — HYDROMORPHONE HCL 110MG/55ML
0.2 PATIENT CONTROLLED ANALGESIA SYRINGE INTRAVENOUS
Status: DISCONTINUED | OUTPATIENT
Start: 2021-09-20 | End: 2021-09-24 | Stop reason: HOSPADM

## 2021-09-20 RX ORDER — ONDANSETRON 2 MG/ML
4 INJECTION INTRAMUSCULAR; INTRAVENOUS ONCE AS NEEDED
Status: DISCONTINUED | OUTPATIENT
Start: 2021-09-20 | End: 2021-09-24 | Stop reason: HOSPADM

## 2021-09-20 RX ORDER — EPHEDRINE SULFATE 50 MG/ML
INJECTION INTRAVENOUS AS NEEDED
Status: DISCONTINUED | OUTPATIENT
Start: 2021-09-20 | End: 2021-09-20

## 2021-09-20 RX ORDER — SODIUM CHLORIDE, SODIUM LACTATE, POTASSIUM CHLORIDE, CALCIUM CHLORIDE 600; 310; 30; 20 MG/100ML; MG/100ML; MG/100ML; MG/100ML
20 INJECTION, SOLUTION INTRAVENOUS CONTINUOUS
Status: DISCONTINUED | OUTPATIENT
Start: 2021-09-20 | End: 2021-09-24 | Stop reason: HOSPADM

## 2021-09-20 RX ORDER — FENTANYL CITRATE/PF 50 MCG/ML
25 SYRINGE (ML) INJECTION
Status: DISCONTINUED | OUTPATIENT
Start: 2021-09-20 | End: 2021-09-24 | Stop reason: HOSPADM

## 2021-09-20 RX ORDER — LIDOCAINE HYDROCHLORIDE 10 MG/ML
INJECTION, SOLUTION EPIDURAL; INFILTRATION; INTRACAUDAL; PERINEURAL AS NEEDED
Status: DISCONTINUED | OUTPATIENT
Start: 2021-09-20 | End: 2021-09-20

## 2021-09-20 RX ADMIN — PROPOFOL 30 MG: 10 INJECTION, EMULSION INTRAVENOUS at 09:07

## 2021-09-20 RX ADMIN — PROPOFOL 30 MG: 10 INJECTION, EMULSION INTRAVENOUS at 09:03

## 2021-09-20 RX ADMIN — PROPOFOL 40 MG: 10 INJECTION, EMULSION INTRAVENOUS at 09:14

## 2021-09-20 RX ADMIN — PROPOFOL 30 MG: 10 INJECTION, EMULSION INTRAVENOUS at 09:30

## 2021-09-20 RX ADMIN — PROPOFOL 70 MG: 10 INJECTION, EMULSION INTRAVENOUS at 08:45

## 2021-09-20 RX ADMIN — PROPOFOL 30 MG: 10 INJECTION, EMULSION INTRAVENOUS at 09:25

## 2021-09-20 RX ADMIN — EPHEDRINE SULFATE 5 MG: 50 INJECTION, SOLUTION INTRAVENOUS at 08:52

## 2021-09-20 RX ADMIN — SODIUM CHLORIDE, SODIUM LACTATE, POTASSIUM CHLORIDE, AND CALCIUM CHLORIDE: .6; .31; .03; .02 INJECTION, SOLUTION INTRAVENOUS at 08:31

## 2021-09-20 RX ADMIN — PROPOFOL 70 MG: 10 INJECTION, EMULSION INTRAVENOUS at 08:52

## 2021-09-20 RX ADMIN — PROPOFOL 40 MG: 10 INJECTION, EMULSION INTRAVENOUS at 09:37

## 2021-09-20 RX ADMIN — PROPOFOL 30 MG: 10 INJECTION, EMULSION INTRAVENOUS at 09:19

## 2021-09-20 RX ADMIN — PROPOFOL 30 MG: 10 INJECTION, EMULSION INTRAVENOUS at 08:41

## 2021-09-20 RX ADMIN — LIDOCAINE HYDROCHLORIDE 50 MG: 10 INJECTION, SOLUTION EPIDURAL; INFILTRATION; INTRACAUDAL at 08:35

## 2021-09-20 RX ADMIN — PROPOFOL 50 MG: 10 INJECTION, EMULSION INTRAVENOUS at 08:46

## 2021-09-20 RX ADMIN — SODIUM CHLORIDE, SODIUM LACTATE, POTASSIUM CHLORIDE, AND CALCIUM CHLORIDE: .6; .31; .03; .02 INJECTION, SOLUTION INTRAVENOUS at 09:32

## 2021-09-20 RX ADMIN — PROPOFOL 30 MG: 10 INJECTION, EMULSION INTRAVENOUS at 08:56

## 2021-09-20 RX ADMIN — PROPOFOL 50 MG: 10 INJECTION, EMULSION INTRAVENOUS at 08:35

## 2021-09-20 NOTE — ANESTHESIA PREPROCEDURE EVALUATION
Procedure:  COLONOSCOPY    Relevant Problems   CARDIO   (+) Chronic atrial fibrillation (HCC)   (+) Essential hypertension   (+) Hyperlipidemia LDL goal <100      GI/HEPATIC   (+) Gastroesophageal reflux disease without esophagitis      /RENAL   (+) Stage 3 chronic kidney disease (HCC)      MUSCULOSKELETAL   (+) Osteoarthritis of both wrists   (+) Osteoarthritis of hip      CAD/PCI/MI/CHF -- denies, AFIB last [facility] Saturday, PPM  COPD/ASTHMA/RICH -- denies  PROBS WITH PRIOR ANESTHESIA -- denies  NPO STATUS CONFIRMED    Lab Results   Component Value Date    WBC 7 26 08/16/2021    HGB 12 5 08/16/2021     08/16/2021     Lab Results   Component Value Date    SODIUM 138 08/23/2021    K 4 0 08/23/2021    BUN 19 08/23/2021    CREATININE 0 97 08/23/2021    EGFR 55 08/23/2021    GLUCOSE 88 11/25/2015     Lab Results   Component Value Date    PTT 36 10/23/2018      Lab Results   Component Value Date    INR 2 60 (H) 10/23/2018           Lab Results   Component Value Date    HGBA1C 5 6 06/18/2016          Sonographer:  BRANDON Melchor  Primary Physician:  Paul Tubbs DO  Referring Physician:  Elvi Dunlap PA-C  Group:  [facility]  Cardiology Fellow:  Salma Vogel MD  Interpreting Physician:  Charley Rudd MD     SUMMARY     LEFT VENTRICLE:  Systolic function was normal  Ejection fraction was estimated to be 60 %  There were no regional wall motion abnormalities  Wall thickness was mildly increased  There was mild concentric hypertrophy      LEFT ATRIUM:  The atrium was moderately to markedly dilated      RIGHT ATRIUM:  The atrium was mildly to moderately dilated      MITRAL VALVE:  There was mild annular calcification  There was mild regurgitation      AORTIC VALVE:  The valve was trileaflet  Leaflets exhibited moderate calcification, normal cuspal separation, and sclerosis    There was mild to moderate regurgitation      TRICUSPID VALVE:  There was mild to moderate regurgitation  Pulmonary artery systolic pressure was moderately increased  Estimated peak PA pressure was 50 mmHg  Physical Exam    Airway    Mallampati score: II  TM Distance: >3 FB       Dental   No notable dental hx     Cardiovascular      Pulmonary      Other Findings        Anesthesia Plan  ASA Score- 3     Anesthesia Type- IV sedation with anesthesia with ASA Monitors  Additional Monitors:   Airway Plan:     Comment: KARLI Kinsey , have personally seen and evaluated the patient prior to anesthetic care  I have reviewed the pre-anesthetic record, and other medical records if appropriate to the anesthetic care  If a CRNA is involved in the case, I have reviewed the CRNA assessment, if present, and agree  Risks/benefits and alternatives discussed with patient including possible PONV, sore throat, and possibility of rare anesthetic and surgical emergencies          Plan Factors-Exercise tolerance (METS): >4 METS  Chart reviewed  EKG reviewed  Imaging results reviewed  Existing labs reviewed  Patient summary reviewed  Patient is not a current smoker  Patient instructed to abstain from smoking on day of procedure  Patient did not smoke on day of surgery  Induction-     Postoperative Plan-     Informed Consent- Anesthetic plan and risks discussed with patient  I personally reviewed this patient with the CRNA  Discussed and agreed on the Anesthesia Plan with the CRNA  Roberto Hood

## 2021-09-20 NOTE — H&P
History and Physical - [facility]  Lisa De La Garza 80 y o  female MRN: [id number]                  HPI: Lisa De La Garza is a 80y o  year old female who presents for follow-up colonoscopy of large ascending colon polyp, partially removed in the past and for relook today to ensure all polyp is gone  REVIEW OF SYSTEMS: Per the HPI, and otherwise unremarkable  Historical Information   Past Medical History:   Diagnosis Date    A-fib ([address] )     Anemia     Arthritis     Breast pain, right     Chest pain on breathing     Colon polyp     Cough     Diverticulosis     Encounter for screening colonoscopy     H/O sick sinus syndrome     Heart murmur     High cholesterol     History of atrial fibrillation     Rate ontrolled  On xarelto 15mg (creatinine 50) Followed by Dr Nidhi Cade with Cardiology     History of weight loss     Report loose fitting clothes  Weight stable (3lbs difference)  Last colo showed small tubular adenoma x2  Breast biopsy last showed fibrocystic changes  TSH wnl  Will check cbc, bmp, spep   Hx of long term use of blood thinners     Hypertension     Irregular heart beat     Pacemaker     Preop examination     Shortness of breath     Viral bronchitis      Past Surgical History:   Procedure Laterality Date    BREAST BIOPSY Right 08/17/2006    ultrasound guided Percutaneous Needle Core -Benign    CATARACT EXTRACTION      CATARACT EXTRACTION W/ INTRAOCULAR LENS  IMPLANT, BILATERAL      COLONOSCOPY      COLONOSCOPY N/A 5/22/2018    Procedure: COLONOSCOPY;  Surgeon: Bryan Paris DO;  [facility]: [facility];   Service: Gastroenterology    INSERT / Adelso Montes / Ronald Charles Right     as a child after a fall    MAMMO STEREOTACTIC BREAST BIOPSY RIGHT (ALL INC) Right 10/2014    benign    [place] CMBND ANTERPOST COLPORRAPHY W/CYSTO N/A 3/17/2016    Procedure: COLPORRHAPHY ANTERIOR POSTERIOR ;  Surgeon: Js Riddle MD;  [facility];  Service: Gynecology    [place] COLONOSCOPY FLX DX W/COLL[facility] WHEN PFRMD N/A 4/4/2019    Procedure: COLONOSCOPY;  Surgeon: Estee Zhang DO;  Location: [facility]; Service: Gastroenterology    IL CYSTOURETHROSCOPY N/A 3/17/2016    Procedure: CYSTOSCOPY;  Surgeon: Kenan Tom MD;  [facility];  Service: Gynecology    IL ESOPHAGOGASTRODUODENOSCOPY TRANSORAL DIAGNOSTIC N/A 4/16/2018    Procedure: EGD AND COLONOSCOPY;  Surgeon: Estee Zhang DO;  Location: [facility];   Service: Gastroenterology    IL REVAGINAL PROLAPSE,SACROSP LIG N/A 3/17/2016    Procedure: COLPOPEXY VAGINAL EXTRAPERITONEAL (VEC) ANTERIOR ;  Surgeon: Kenan Tom MD;  [facility];  Service: Gynecology    IL SLING OPER STRES INCONTINENCE N/A 3/17/2016    Procedure: INSERTION PUBOVAGINAL SLING SINGLE INCISION ;  Surgeon: Kenan Tom MD;  [facility];  Service: Gynecology     Social History   Social History     Substance and Sexual Activity   Alcohol Use No     Social History     Substance and Sexual Activity   Drug Use No     Social History     Tobacco Use   Smoking Status Never Smoker   Smokeless Tobacco Never Used     Family History   Problem Relation Age of Onset    Diabetes Mother     Breast cancer Sister 48    No Known Problems Father     No Known Problems Maternal Grandmother     No Known Problems Maternal Grandfather     No Known Problems Paternal Grandmother     No Known Problems Paternal Grandfather     No Known Problems Daughter     No Known Problems Son     No Known Problems Sister     No Known Problems Sister     No Known Problems Brother     No Known Problems Brother     No Known Problems Sister     No Known Problems Paternal Aunt     No Known Problems Paternal Aunt     No Known Problems Paternal Aunt     No Known Problems Paternal Aunt        Meds/Allergies       Current Outpatient Medications:     Cozaar 100 MG tablet    cyanocobalamin 1,000 mcg/mL    furosemide (LASIX) 20 mg tablet    isosorbide mononitrate (IMDUR) 30 mg 24 hr tablet    loratadine (CLARITIN) 10 mg tablet    losartan (COZAAR) 100 MG tablet    nebivolol (BYSTOLIC) 5 mg tablet    rosuvastatin (CRESTOR) 10 MG tablet    verapamil (VERELAN PM) 120 MG 24 hr capsule    Blood Pressure Monitoring (Blood Pressure Cuff) MISC    fluticasone (FLONASE) 50 mcg/act nasal spray    rivaroxaban (Xarelto) 15 mg tablet    spironolactone (ALDACTONE) 25 mg tablet    Current Facility-Administered Medications:     cyanocobalamin injection 1,000 mcg, 1,000 mcg, Intramuscular, Q30 Days, 1,000 mcg at 08/20/21 4568    Allergies   Allergen Reactions    Other Itching     Bandaids    Tape [Medical Tape] Itching       Objective     /81   Pulse 84   Temp 97 5 °F (36 4 °C) (Temporal)   Resp 18   Ht 5' 3" (1 6 m)   Wt 58 5 kg (129 lb)   SpO2 99%   BMI 22 85 kg/m²       PHYSICAL EXAM    Gen: NAD  Head: NCAT  CV: RRR  CHEST: Clear  ABD: soft, NT/ND  EXT: no edema      ASSESSMENT/PLAN:  This is a 80y o  year old female here for colonoscopy, and she is stable and optimized for her procedure

## 2021-09-20 NOTE — ANESTHESIA POSTPROCEDURE EVALUATION
Post-Op Assessment Note    CV Status:  Stable    Pain management: adequate     Mental Status:  Alert and awake   Hydration Status:  Euvolemic   PONV Controlled:  Controlled   Airway Patency:  Patent      Post Op Vitals Reviewed: Yes      Staff: CRNA         No complications documented      /76 (09/20/21 0946)    Temp (!) 96 8 °F (36 °C) (09/20/21 0946)    Pulse 73 (09/20/21 0946)   Resp 18 (09/20/21 0946)    SpO2 100 % (09/20/21 0946)

## 2021-09-21 ENCOUNTER — OFFICE VISIT (OUTPATIENT)
Dept: CARDIOLOGY CLINIC | Facility: CLINIC | Age: 81
End: 2021-09-21
Payer: COMMERCIAL

## 2021-09-21 VITALS
HEART RATE: 81 BPM | HEIGHT: 62 IN | DIASTOLIC BLOOD PRESSURE: 80 MMHG | BODY MASS INDEX: 24.73 KG/M2 | SYSTOLIC BLOOD PRESSURE: 140 MMHG | WEIGHT: 134.4 LBS

## 2021-09-21 DIAGNOSIS — I10 HYPERTENSION, UNSPECIFIED TYPE: ICD-10-CM

## 2021-09-21 DIAGNOSIS — I48.20 CHRONIC ATRIAL FIBRILLATION (HCC): Primary | ICD-10-CM

## 2021-09-21 PROCEDURE — 1160F RVW MEDS BY RX/DR IN RCRD: CPT | Performed by: INTERNAL MEDICINE

## 2021-09-21 PROCEDURE — 99214 OFFICE O/P EST MOD 30 MIN: CPT | Performed by: INTERNAL MEDICINE

## 2021-09-21 PROCEDURE — 3079F DIAST BP 80-89 MM HG: CPT | Performed by: INTERNAL MEDICINE

## 2021-09-21 PROCEDURE — 1036F TOBACCO NON-USER: CPT | Performed by: INTERNAL MEDICINE

## 2021-09-21 PROCEDURE — 3077F SYST BP >= 140 MM HG: CPT | Performed by: INTERNAL MEDICINE

## 2021-09-21 RX ORDER — SPIRONOLACTONE 25 MG/1
25 TABLET ORAL DAILY
Qty: 90 TABLET | Refills: 3 | Status: SHIPPED | OUTPATIENT
Start: 2021-09-21 | End: 2022-01-11

## 2021-09-22 ENCOUNTER — OFFICE VISIT (OUTPATIENT)
Dept: INTERNAL MEDICINE CLINIC | Facility: CLINIC | Age: 81
End: 2021-09-22

## 2021-09-22 ENCOUNTER — TELEPHONE (OUTPATIENT)
Dept: INTERNAL MEDICINE CLINIC | Facility: CLINIC | Age: 81
End: 2021-09-22

## 2021-09-22 VITALS
TEMPERATURE: 97.7 F | DIASTOLIC BLOOD PRESSURE: 71 MMHG | SYSTOLIC BLOOD PRESSURE: 148 MMHG | WEIGHT: 137 LBS | OXYGEN SATURATION: 96 % | HEART RATE: 71 BPM | BODY MASS INDEX: 25.06 KG/M2

## 2021-09-22 DIAGNOSIS — Z23 NEED FOR INFLUENZA VACCINATION: ICD-10-CM

## 2021-09-22 DIAGNOSIS — I48.20 CHRONIC ATRIAL FIBRILLATION (HCC): ICD-10-CM

## 2021-09-22 DIAGNOSIS — I10 ESSENTIAL HYPERTENSION: Primary | ICD-10-CM

## 2021-09-22 DIAGNOSIS — E53.8 B12 DEFICIENCY: Chronic | ICD-10-CM

## 2021-09-22 DIAGNOSIS — H91.92 DECREASED HEARING OF LEFT EAR: ICD-10-CM

## 2021-09-22 PROCEDURE — 1160F RVW MEDS BY RX/DR IN RCRD: CPT | Performed by: INTERNAL MEDICINE

## 2021-09-22 PROCEDURE — 96372 THER/PROPH/DIAG INJ SC/IM: CPT | Performed by: INTERNAL MEDICINE

## 2021-09-22 PROCEDURE — 90662 IIV NO PRSV INCREASED AG IM: CPT | Performed by: STUDENT IN AN ORGANIZED HEALTH CARE EDUCATION/TRAINING PROGRAM

## 2021-09-22 PROCEDURE — G0008 ADMIN INFLUENZA VIRUS VAC: HCPCS | Performed by: STUDENT IN AN ORGANIZED HEALTH CARE EDUCATION/TRAINING PROGRAM

## 2021-09-22 PROCEDURE — 3078F DIAST BP <80 MM HG: CPT | Performed by: INTERNAL MEDICINE

## 2021-09-22 PROCEDURE — 99213 OFFICE O/P EST LOW 20 MIN: CPT | Performed by: INTERNAL MEDICINE

## 2021-09-22 PROCEDURE — 3077F SYST BP >= 140 MM HG: CPT | Performed by: INTERNAL MEDICINE

## 2021-09-22 PROCEDURE — 1036F TOBACCO NON-USER: CPT | Performed by: INTERNAL MEDICINE

## 2021-09-22 RX ORDER — CHLORTHALIDONE 25 MG/1
25 TABLET ORAL DAILY
Qty: 30 TABLET | Refills: 2 | Status: SHIPPED | OUTPATIENT
Start: 2021-09-22 | End: 2021-12-02

## 2021-09-22 RX ADMIN — CYANOCOBALAMIN 1000 MCG: 1000 INJECTION INTRAMUSCULAR; SUBCUTANEOUS at 09:10

## 2021-09-22 NOTE — TELEPHONE ENCOUNTER
Referral for Audiology    H91 92 (ICD-10-CM) - Decreased hearing of left ear    Per patient any day of the week is okay to schedule, preferably not Fridays    Thank yousw

## 2021-09-22 NOTE — PATIENT INSTRUCTIONS
Please start taking chlorthalidone 25 mg daily  Please start taking magnesium oxide 400 mg daily  Please increase verapamil to 180 mg daily  Please stop taking Lasix 20 mg daily  Please obtain laboratory studies in around 4 weeks  Please continue to monitor blood pressure at home

## 2021-09-22 NOTE — PROGRESS NOTES
[facility]  INTERNAL MEDICINE OFFICE VISIT     PATIENT INFORMATION     Nereyda Giang   80 y o  female   MRN: [id number]    ASSESSMENT/PLAN     Diagnoses and all orders for this visit:    Essential hypertension  -     chlorthalidone 25 mg tablet; Take 1 tablet (25 mg total) by mouth daily  -     magnesium oxide (MAG-OX) 400 mg; Take 1 tablet (400 mg total) by mouth daily  -     Basic metabolic panel; Future  -     verapamil (CALAN-SR) 180 mg CR tablet; Take 1 tablet (180 mg total) by mouth daily at bedtime  -  Given that the patient's blood pressure remains elevated will increase her verapamil from 120 to 180 mg daily  Will discontinue her Lasix 20 mg daily and instead start her on chlorthalidone 25 mg daily  Will add magnesium oxide 400 mg daily as well  Also will order a follow-up BMP for 4 weeks to make sure patient does not have any significant electrolyte abnormalities  The patient was also instructed to continue taking her losartan 100 mg daily as well as continue with her nebivolol, Imdur and spironolactone  She was also instructed to continue taking her blood pressures at home  -   Will recheck her blood pressure at the next visit in 3 months and can make adjustments as needed    B12 deficiency        -  Patient received her B12 vaccine at the end of today's visit    Decreased hearing of left ear  -     Ambulatory referral to Audiology; Future  - The patient reports recently noticing a decrease in hearing in her left ear and requested a referral to audiology    Need for influenza vaccination  -     influenza vaccine, high-dose, PF 0 7 mL (FLUZONE HIGH-DOSE)    Schedule a follow-up appointment in 3 months      HEALTH MAINTENANCE     Immunization History   Administered Date(s) Administered    INFLUENZA 10/11/2005, 10/24/2006, 10/15/2007, 09/28/2015, 10/06/2016, 10/18/2017    Influenza Quadrivalent Preservative Free 3 years and older IM 10/06/2016    Influenza Quadrivalent, 6-35 Months IM 10/18/2017    Influenza, high dose seasonal 0 7 mL 10/12/2018, 09/23/2019, 09/23/2020, 09/22/2021    Influenza, seasonal, injectable 09/24/2013, 09/24/2014, 09/28/2015    Pneumococcal Conjugate 13-Valent 05/31/2016    Pneumococcal Polysaccharide PPV23 01/01/2001, 12/18/2019    SARS-CoV-2 / COVID-19 mRNA IM ([facility]) 02/19/2021, 05/27/2021    Tdap 03/12/2014     CHIEF COMPLAINT     Chief Complaint   Patient presents with    Follow-up      HISTORY OF PRESENT ILLNESS     The patient is an 60-year-old female with a past medical history of Afib, hypertension, GERD, CKD stage 3, hyperlipidemia, osteoarthritis, B12 deficiency and tachy-tasha syndrome status post pacemaker who presented to the clinic today for a 4 month hypertension follow-up  She was last seen on 05/18/2021 and at that time her blood pressure was 137/79  At that time she was instructed to continue with her current blood pressure regimen which includes Cozaar 100 mg daily and verapamil 120 mg daily  Of note she also takes Lasix 20 mg daily, Imdur 30 mg daily, Spironolactone 25 mg daily and Nebivolol 5 mg b i d     At today's visit the patient's original blood pressure was 168/63  Upon recheck her blood pressure was later 155/65 and 148/71  She states that she has been compliant with her medications  She checks her blood pressures regularly at home and they vary  She states that she did not have any problem with elevated blood pressure until after she received her COVID-19 vaccines in the spring  She was recently seen by Cardiology and had a cardiac catheterization that showed single-vessel disease  Her last echo showed an EF of 60%  Overall today she is feeling well  She states that in general she feels mildly fatigued and has noticed a decrease in hearing of her left year, but otherwise had no other acute complaints  She denies any recent fevers, chills, chest pain, shortness of breath or abdominal pain    She received her flu vaccine and B12 injection at today's visit  REVIEW OF SYSTEMS     Review of Systems   Constitutional: Positive for fatigue  Negative for activity change, appetite change, chills, diaphoresis and fever  HENT: Negative for congestion, ear discharge, ear pain, hearing loss, sinus pressure, sinus pain and sore throat  Eyes: Negative for pain, discharge, redness and itching  Respiratory: Negative for cough, chest tightness, shortness of breath and wheezing  Cardiovascular: Negative for chest pain, palpitations and leg swelling  Gastrointestinal: Negative for abdominal distention, abdominal pain, blood in stool, constipation, diarrhea, nausea and vomiting  Genitourinary: Negative for difficulty urinating, dysuria, flank pain and frequency  Musculoskeletal: Negative for arthralgias, back pain and gait problem  Skin: Negative for color change, pallor and rash  Neurological: Negative for dizziness, weakness, light-headedness, numbness and headaches  Psychiatric/Behavioral: Negative for agitation, behavioral problems, confusion and decreased concentration  OBJECTIVE     Vitals:    09/22/21 0858   BP: 148/71   BP Location: Left arm   Patient Position: Sitting   Cuff Size: Standard   Pulse: 71   Temp: 97 7 °F (36 5 °C)   TempSrc: Temporal   SpO2: 96%   Weight: 62 1 kg (137 lb)     Physical Exam  Constitutional:       Appearance: She is well-developed  HENT:      Head: Normocephalic and atraumatic  Nose: Nose normal    Eyes:      Conjunctiva/sclera: Conjunctivae normal       Pupils: Pupils are equal, round, and reactive to light  Neck:      Vascular: No JVD  Cardiovascular:      Rate and Rhythm: Normal rate and regular rhythm  Heart sounds: Normal heart sounds  No murmur heard  No friction rub  No gallop  Pulmonary:      Effort: Pulmonary effort is normal  No respiratory distress  Breath sounds: Normal breath sounds  No stridor  No wheezing or rales  Chest:      Chest wall: No tenderness  Abdominal:      General: Bowel sounds are normal  There is no distension  Palpations: Abdomen is soft  There is no mass  Tenderness: There is no abdominal tenderness  There is no guarding or rebound  Hernia: No hernia is present  Musculoskeletal:         General: No tenderness or deformity  Right lower leg: No edema  Left lower leg: Edema present  Skin:     General: Skin is warm and dry  Neurological:      Mental Status: She is alert and oriented to person, place, and time  Psychiatric:         Mood and Affect: Mood normal          Behavior: Behavior normal          Thought Content:  Thought content normal          Judgment: Judgment normal        CURRENT MEDICATIONS     Current Outpatient Medications:     Blood Pressure Monitoring (Blood Pressure Cuff) MISC, Use every other day For HTN, Disp: 1 each, Rfl: 0    cyanocobalamin 1,000 mcg/mL, INJECT 1 ML (1,000 MCG TOTAL) INTO A MUSCLE EVERY 30 (THIRTY) DAYS, Disp: 3 mL, Rfl: 0    isosorbide mononitrate (IMDUR) 30 mg 24 hr tablet, Take 1 tablet (30 mg total) by mouth daily, Disp: 30 tablet, Rfl: 0    loratadine (CLARITIN) 10 mg tablet, TAKE 1 TABLET (10 MG TOTAL) BY MOUTH DAILY, Disp: 30 tablet, Rfl: 0    losartan (COZAAR) 100 MG tablet, Take 1 tablet (100 mg total) by mouth daily, Disp: 90 tablet, Rfl: 0    nebivolol (BYSTOLIC) 5 mg tablet, Take 1 tablet (5 mg total) by mouth 2 (two) times a day, Disp: 90 tablet, Rfl: 3    rivaroxaban (Xarelto) 15 mg tablet, Take 1 tablet (15 mg total) by mouth daily, Disp: 90 tablet, Rfl: 3    rosuvastatin (CRESTOR) 10 MG tablet, Take 1 tablet (10 mg total) by mouth daily, Disp: 30 tablet, Rfl: 0    spironolactone (ALDACTONE) 25 mg tablet, Take 1 tablet (25 mg total) by mouth daily, Disp: 90 tablet, Rfl: 3    chlorthalidone 25 mg tablet, Take 1 tablet (25 mg total) by mouth daily, Disp: 30 tablet, Rfl: 2    Cozaar 100 MG tablet, Take 1 tablet (100 mg total) by mouth daily (Patient not taking: Reported on 9/22/2021), Disp: 90 tablet, Rfl: 3    fluticasone (FLONASE) 50 mcg/act nasal spray, 1 spray into each nostril daily (Patient not taking: Reported on 9/22/2021), Disp: 1 Bottle, Rfl: 1    magnesium oxide (MAG-OX) 400 mg, Take 1 tablet (400 mg total) by mouth daily, Disp: 30 tablet, Rfl: 2    verapamil (CALAN-SR) 180 mg CR tablet, Take 1 tablet (180 mg total) by mouth daily at bedtime, Disp: 30 tablet, Rfl: 2    Current Facility-Administered Medications:     cyanocobalamin injection 1,000 mcg, 1,000 mcg, Intramuscular, Q30 Days, Erica Ernst MD, 1,000 mcg at 09/22/21 5182    Facility-Administered Medications Ordered in Other Visits:     fentaNYL (SUBLIMAZE) injection 25 mcg, 25 mcg, Intravenous, Q5 Min PRN, Marsha Lopez MD    HYDROmorphone (DILAUDID) injection 0 2 mg, 0 2 mg, Intravenous, Q5 Min PRN, Marsha Lopez MD    lactated ringers infusion, 20 mL/hr, Intravenous, Continuous, Marsha Lopez MD, Last Rate: 20 mL/hr at 09/20/21 0948, 20 mL/hr at 09/20/21 0948    ondansetron (ZOFRAN) injection 4 mg, 4 mg, Intravenous, Once PRN, Marsha Lopez MD    PAST MEDICAL & SURGICAL HISTORY     Past Medical History:   Diagnosis Date    [facility]     Anemia     Arthritis     Breast pain, right     Chest pain on breathing     Colon polyp     Cough     Diverticulosis     Encounter for screening colonoscopy     H/O sick sinus syndrome     Heart murmur     High cholesterol     History of atrial fibrillation     Rate ontrolled  On xarelto 15mg (creatinine 50) Followed by Dr David Mcnally with Cardiology     History of weight loss     Report loose fitting clothes  Weight stable (3lbs difference)  Last colo showed small tubular adenoma x2  Breast biopsy last showed fibrocystic changes  TSH wnl  Will check cbc, bmp, spep          Hx of long term use of blood thinners     Hypertension     Irregular heart beat     Pacemaker     Preop examination     Shortness of breath     Viral bronchitis      Past Surgical History:   Procedure Laterality Date    BREAST BIOPSY Right 08/17/2006    ultrasound guided Percutaneous Needle Core -Benign    CATARACT EXTRACTION      CATARACT EXTRACTION W/ INTRAOCULAR LENS  IMPLANT, BILATERAL      COLONOSCOPY      COLONOSCOPY N/A 5/22/2018    Procedure: COLONOSCOPY;  Surgeon: Marisol To DO;  [facility]; Service: Gastroenterology    Taya Heide / Estrella Jaime / Casey Jacobo Right     as a child after a fall    MAMMO STEREOTACTIC BREAST BIOPSY RIGHT (ALL INC) Right 10/2014    benign    [place] CMBND ANTERPOST COLPORRAPHY W/CYSTO N/A 3/17/2016    Procedure: COLPORRHAPHY ANTERIOR POSTERIOR ;  Surgeon: Ning Gatica MD;  [facility];  Service: Gynecology    [place] COLONOSCOPY FLX DX W/[facility]ABIL[facility] WHEN PFRMD N/A 4/4/2019    Procedure: COLONOSCOPY;  Surgeon: Marisol To DO;  [facility]; Service: Gastroenterology    LA CYSTOURETHROSCOPY N/A 3/17/2016    Procedure: CYSTOSCOPY;  Surgeon: Ning Gatica MD;  [facility];  Service: Gynecology    LA ESOPHAGOGASTRODUODENOSCOPY TRANSORAL DIAGNOSTIC N/A 4/16/2018    Procedure: EGD AND COLONOSCOPY;  Surgeon: Marisol To DO;  [facility]; Service: Gastroenterology    LA REVAGINAL PROLAPSE,SACROSP LIG N/A 3/17/2016    Procedure: COLPOPEXY VAGINAL EXTRAPERITONEAL (VEC) ANTERIOR ;  Surgeon: Ning Gatica MD;  [facility];  Service: Gynecology    LA SLING OPER STRES INCONTINENCE N/A 3/17/2016    Procedure: INSERTION PUBOVAGINAL SLING SINGLE INCISION ;  Surgeon: Ning Gatica MD;  [facility];  Service: Gynecology     SOCIAL & FAMILY HISTORY     Social History     Socioeconomic History    Marital status:       Spouse name: Not on file    Number of children: Not on file    Years of education: Not on file    Highest education level: Not on file Occupational History    Occupation: retired   Tobacco Use    Smoking status: Never Smoker    Smokeless tobacco: Never Used   Vaping Use    Vaping Use: Never used   Substance and Sexual Activity    Alcohol use: No    Drug use: No    Sexual activity: Not Currently     Comment:    Other Topics Concern    Not on file   Social History Narrative    Housing, household, and economic circumstances      Social Determinants of Health     Financial Resource Strain: Medium Risk    Difficulty of Paying Living Expenses: Somewhat hard   Food Insecurity: No Food Insecurity    Worried About Running Out of Food in the Last Year: Never true    Doris of Food in the Last Year: Never true   Transportation Needs: No Transportation Needs    Lack of Transportation (Medical): No    Lack of Transportation (Non-Medical): No   Physical Activity: Inactive    Days of Exercise per Week: 0 days    Minutes of Exercise per Session: 0 min   Stress: No Stress Concern Present    Feeling of Stress : Not at all   Social Connections: Moderately Isolated    Frequency of Communication with Friends and Family: More than three times a week    Frequency of Social Gatherings with Friends and Family: More than three times a week    Attends Taoist Services: More than 4 times per year    Active Member of Clubs or Organizations: No    Attends Club or Organization Meetings: Never    Marital Status:     Intimate Partner Violence: Not At Risk    Fear of Current or Ex-Partner: No    Emotionally Abused: No    Physically Abused: No    Sexually Abused: No     Social History     Substance and Sexual Activity   Alcohol Use No     Substance and Sexual Activity   Alcohol Use No        Substance and Sexual Activity   Drug Use No     Social History     Tobacco Use   Smoking Status Never Smoker   Smokeless Tobacco Never Used     Family History   Problem Relation Age of Onset    Diabetes Mother     Breast cancer Sister 48    No Known Problems Father     No Known Problems Maternal Grandmother     No Known Problems Maternal Grandfather     No Known Problems Paternal Grandmother     No Known Problems Paternal Grandfather     No Known Problems Daughter     No Known Problems Son     No Known Problems Sister     No Known Problems Sister     No Known Problems Brother     No Known Problems Brother     No Known Problems Sister     No Known Problems Paternal Aunt     No Known Problems Paternal Aunt     No Known Problems Paternal Aunt     No Known Problems Paternal Aunt      ==  Ulysses Pepper, MD   Resident, PGY-2  [facility] Internal Medicine [facility] [address] , [address] [address] [facility] [address] [facility]  Office: [phone]  Fax: [phone]

## 2021-09-23 NOTE — TELEPHONE ENCOUNTER
Patient schedule left message sylvain velasquez [phone] will mail appointment information to address on file

## 2021-09-24 PROCEDURE — 93000 ELECTROCARDIOGRAM COMPLETE: CPT | Performed by: INTERNAL MEDICINE

## 2021-09-24 NOTE — PROGRESS NOTES
Electrophysiology Hospital Follow Up  [facility] [place]  [facility], [place], [address]    Name: Marquis Whitfield  : 1940  MRN: [id number]    ASSESSMENT:  1  Chronic atrial fibrillation (HCC)  POCT ECG   2  Hypertension, unspecified type  spironolactone (ALDACTONE) 25 mg tablet       PLAN:  1  Exertional dyspnea, improved  - abnormal stress test 2021 / followed up with cardiac catheterization 2021 showing 50% stenosis of mid RCA no intervention  - non maintained on Imdur 30 mg daily  2  Persistent atrial fibrillation  - anticoagulated with low dose Xarelto / Guvho2Smeg score of 4 (age, sex, HTN)  - EF of 60% per echo 2021 / LA diameter of 5 41cm  - rate control: Bystolic 5mg twice daily   - antiarrhythmic therapy: none   3  [employer]onic single-chamber pacemaker in Situ, right-sided   - 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  - prior generator change 3/15/2018  - history of left sided infection with erosion of pacemaker  4  Essential hypertension   - maintained on losartan 100mg daily, furosemide 20mg daily, verapamil 120mg daily, and Bystolic   5  Hyperlipidemia   - maintained on rosuvastatin 5mg daily   6  Tubulovillous adenoma  - recent colonoscopy with polyps removed  7  CKD stage 3 with left renal artery stenosis  8  Isolated episode of hemoptysis    IMPRESSION:  1  Dyspnea - no blockages were found on catheterization last month but she was placed on Imdur and does have much relief from this medication with her dyspnea  Will continue to monitor but no further testing is needed at this time  2  Medications - patient did have questions about Imdur and these were answered to the best my abilities  We did also go over her meds and when exactly to take them to help insure that blood pressures are not too low in the morning she will take couple of her blood pressure medications in the evening instead of the morning      3  Bleeding - patient did have a prior episode of hemoptysis, she has not had this again  She also went for colonoscopy this past week and had no issues with bleeding either  Patient is to follow up in our EP office in 3 months  She is to call our office with any further questions or concerns in the meantime  HPI:   Interim history: Delvis Ramos is a 80 y o  female with persistent atrial fibrillation anticoagulated with low-dose Xarelto, sick sinus syndrome status post pacemaker, essential hypertension, hyperlipidemia, history of tubulovillous adenoma, CKD stage 3 with left renal artery stenosis  She presents today for hospital follow up after cath  Catheterization was done last month due to her being seen in the office with an abnormal stress test and complaints exertional dyspnea  Revealed a 50% stenosis of mid RCA that was not intervened on but she was placed on Imdur  Patient reports, with the help of her homemade, that she does feel much better in terms of her shortness of breath  Her only concern is that her blood pressure starts out lower in the morning and gets higher throughout the day  Other than that, she offers no other complaints  She did also just undergo colonoscopy which they did remove some polyps  Cardiac History:  1  Established with [facility] 2/2018  2  Underwent generator change 3/2018 (right sided due to prior pacer erosion)  3  Issues with BP in 2019 surrounding losartan  4  EF remains preserved 11/2019 (prior nuclear stress 1/2018 showing no perfusion defects)  5  Complaints of SOB and fatigue - repeat echo and stress (abnormal)  6  Cath 8/2021 showed mid RCA 50% stenosis - no intervention   A  Imdur added    EKG:  Ventricular paced rhythm at 81 beats per minute with underlying atrial fibrillation, PVC noted on rhythm strip    ROS:   Review of Systems   Constitutional: Positive for malaise/fatigue  Negative for chills, diaphoresis and fever     Cardiovascular: Negative for chest pain, claudication, cyanosis, dyspnea on exertion, irregular heartbeat, leg swelling, near-syncope, orthopnea, palpitations, paroxysmal nocturnal dyspnea and syncope  Respiratory: Negative for shortness of breath and sleep disturbances due to breathing  Neurological: Negative for dizziness and light-headedness  All other systems reviewed and are negative  OBJECTIVE:   Vitals:   /80 (BP Location: Left arm, Patient Position: Sitting, Cuff Size: Adult)   Pulse 81   Ht 5' 2" (1 575 m)   Wt 61 kg (134 lb 6 4 oz)   BMI 24 58 kg/m²       Physical Exam:   Physical Exam  Vitals and nursing note reviewed  Constitutional:       General: She is not in acute distress  Appearance: She is well-developed  She is not diaphoretic  Comments: Frail   HENT:      Head: Normocephalic and atraumatic  Eyes:      Pupils: Pupils are equal, round, and reactive to light  Neck:      Vascular: No JVD  Cardiovascular:      Rate and Rhythm: Normal rate and regular rhythm  Heart sounds: No murmur heard  No friction rub  No gallop  Pulmonary:      Effort: Pulmonary effort is normal  No respiratory distress  Breath sounds: Normal breath sounds  No wheezing  Abdominal:      Palpations: Abdomen is soft  Musculoskeletal:         General: Normal range of motion  Cervical back: Normal range of motion  Skin:     General: Skin is warm and dry  Neurological:      Mental Status: She is alert and oriented to person, place, and time           Medications:      Current Outpatient Medications:     Blood Pressure Monitoring (Blood Pressure Cuff) MISC, Use every other day For HTN, Disp: 1 each, Rfl: 0    Cozaar 100 MG tablet, Take 1 tablet (100 mg total) by mouth daily (Patient not taking: Reported on 9/22/2021), Disp: 90 tablet, Rfl: 3    cyanocobalamin 1,000 mcg/mL, INJECT 1 ML (1,000 MCG TOTAL) INTO A MUSCLE EVERY 30 (THIRTY) DAYS, Disp: 3 mL, Rfl: 0    fluticasone (FLONASE) 50 mcg/act nasal spray, 1 spray into each nostril daily (Patient not taking: Reported on 9/22/2021), Disp: 1 Bottle, Rfl: 1    isosorbide mononitrate (IMDUR) 30 mg 24 hr tablet, Take 1 tablet (30 mg total) by mouth daily, Disp: 30 tablet, Rfl: 0    loratadine (CLARITIN) 10 mg tablet, TAKE 1 TABLET (10 MG TOTAL) BY MOUTH DAILY, Disp: 30 tablet, Rfl: 0    losartan (COZAAR) 100 MG tablet, Take 1 tablet (100 mg total) by mouth daily, Disp: 90 tablet, Rfl: 0    nebivolol (BYSTOLIC) 5 mg tablet, Take 1 tablet (5 mg total) by mouth 2 (two) times a day, Disp: 90 tablet, Rfl: 3    rivaroxaban (Xarelto) 15 mg tablet, Take 1 tablet (15 mg total) by mouth daily, Disp: 90 tablet, Rfl: 3    rosuvastatin (CRESTOR) 10 MG tablet, Take 1 tablet (10 mg total) by mouth daily, Disp: 30 tablet, Rfl: 0    spironolactone (ALDACTONE) 25 mg tablet, Take 1 tablet (25 mg total) by mouth daily, Disp: 90 tablet, Rfl: 3    chlorthalidone 25 mg tablet, Take 1 tablet (25 mg total) by mouth daily, Disp: 30 tablet, Rfl: 2    magnesium oxide (MAG-OX) 400 mg, Take 1 tablet (400 mg total) by mouth daily, Disp: 30 tablet, Rfl: 2    verapamil (CALAN-SR) 180 mg CR tablet, Take 1 tablet (180 mg total) by mouth daily at bedtime, Disp: 30 tablet, Rfl: 2    Current Facility-Administered Medications:     cyanocobalamin injection 1,000 mcg, 1,000 mcg, Intramuscular, Q30 Days, Lizbeth Soulier, MD, 1,000 mcg at 09/22/21 7712     Family History   Problem Relation Age of Onset    Diabetes Mother     Breast cancer Sister 48    No Known Problems Father     No Known Problems Maternal Grandmother     No Known Problems Maternal Grandfather     No Known Problems Paternal Grandmother     No Known Problems Paternal Grandfather     No Known Problems Daughter     No Known Problems Son     No Known Problems Sister     No Known Problems Sister     No Known Problems Brother     No Known Problems Brother     No Known Problems Sister     No Known Problems Paternal Aunt  No Known Problems Paternal Aunt     No Known Problems Paternal Aunt     No Known Problems Paternal Aunt      Social History     Socioeconomic History    Marital status:      Spouse name: Not on file    Number of children: Not on file    Years of education: Not on file    Highest education level: Not on file   Occupational History    Occupation: retired   Tobacco Use    Smoking status: Never Smoker    Smokeless tobacco: Never Used   Vaping Use    Vaping Use: Never used   Substance and Sexual Activity    Alcohol use: No    Drug use: No    Sexual activity: Not Currently     Comment:    Other Topics Concern    Not on file   Social History Narrative    Housing, household, and economic circumstances      Social Determinants of Health     Financial Resource Strain: Medium Risk    Difficulty of Paying Living Expenses: Somewhat hard   Food Insecurity: No Food Insecurity    Worried About Running Out of Food in the Last Year: Never true    Doris of Food in the Last Year: Never true   Transportation Needs: No Transportation Needs    Lack of Transportation (Medical): No    Lack of Transportation (Non-Medical): No   Physical Activity: Inactive    Days of Exercise per Week: 0 days    Minutes of Exercise per Session: 0 min   Stress: No Stress Concern Present    Feeling of Stress : Not at all   Social Connections: Moderately Isolated    Frequency of Communication with Friends and Family: More than three times a week    Frequency of Social Gatherings with Friends and Family: More than three times a week    Attends Gnosticist Services: More than 4 times per year    Active Member of Clubs or Organizations: No    Attends Club or Organization Meetings: Never    Marital Status:     Intimate Partner Violence: Not At Risk    Fear of Current or Ex-Partner: No    Emotionally Abused: No    Physically Abused: No    Sexually Abused: No     Social History     Tobacco Use   Smoking Status Never Smoker   Smokeless Tobacco Never Used     Social History     Substance and Sexual Activity   Alcohol Use No       Labs & Results:  Below is the patient's most recent value for Albumin, ALT, AST, BUN, Calcium, Chloride, Cholesterol, CO2, Creatinine, GFR, Glucose, HDL, Hematocrit, Hemoglobin, Hemoglobin A1C, LDL, Magnesium, Phosphorus, Platelets, Potassium, PSA, Sodium, Triglycerides, and WBC  Lab Results   Component Value Date    ALT 14 2021    AST 13 2021    BUN 19 2021    CALCIUM 8 9 2021     (H) 2021    CHOL 143 2015    CO2 24 2021    CREATININE 0 97 2021    HDL 62 2021    HCT 39 3 2021    HGB 12 5 2021    HGBA1C 5 6 2016    MG 2 1 03/15/2018     2021    K 4 0 2021     2015    TRIG 101 2021    WBC 7 26 2021     Note: for a comprehensive list of the patient's lab results, access the Results Review activity      CARDIAC TESTING:   ECHO:   Results for orders placed during the hospital encounter of 06/10/21    Echo complete with contrast if indicated    Narrative  [address]  [address], [address] [place] [address]  [phone]    Transthoracic Echocardiogram  2D, M-mode, Doppler, and Color Doppler    Study date:  10-Abundio-2021    Patient: Fermin Cuevas  MR number: [id number]  Account number: [de-identified]  : 1940  Age: [de-identified] years  Gender: Female  Status: Outpatient  Location: [address] [place] [address] [facility]  Height: 62 in  Weight: 135 lb  BP: 137/ 97 mmHg    Indications: Dyspnea    Diagnoses: R06 00 - Dyspnea, unspecified    Sonographer:  BRANDON Luna  Primary Physician:  Boston Barboza DO  Referring Physician:  Arlette De La Cruz PA-C  Group:  [facility]  Cardiology Fellow:  Zulema Moya MD  Interpreting Physician:  Sedrick Caro MD    SUMMARY    LEFT VENTRICLE:  Systolic function was normal  Ejection fraction was estimated to be 60 %  There were no regional wall motion abnormalities  Wall thickness was mildly increased  There was mild concentric hypertrophy  LEFT ATRIUM:  The atrium was moderately to markedly dilated  RIGHT ATRIUM:  The atrium was mildly to moderately dilated  MITRAL VALVE:  There was mild annular calcification  There was mild regurgitation  AORTIC VALVE:  The valve was trileaflet  Leaflets exhibited moderate calcification, normal cuspal separation, and sclerosis  There was mild to moderate regurgitation  TRICUSPID VALVE:  There was mild to moderate regurgitation  Pulmonary artery systolic pressure was moderately increased  Estimated peak PA pressure was 50 mmHg  IVC, HEPATIC VEINS:  The inferior vena cava was dilated  Respirophasic changes were blunted (less than 50% variation)  HISTORY: PRIOR HISTORY: Hypertension, hyperlipidemia, atrial fibrillation, sick sinus syndrome, pacemaker    PROCEDURE: The study was performed in the [address] [facility] [place]  This was a routine study  The transthoracic approach was used  The study included complete 2D imaging, M-mode, complete spectral Doppler, and color Doppler  Image  quality was adequate  LEFT VENTRICLE: Size was normal  Systolic function was normal  Ejection fraction was estimated to be 60 %  There were no regional wall motion abnormalities  Wall thickness was mildly increased  There was mild concentric hypertrophy  DOPPLER: Transmitral flow pattern consistent with atrial fibrillation with ventricular pacing  RIGHT VENTRICLE: The size was normal  Systolic function was normal  Wall thickness was normal  A pacemaker lead was present  LEFT ATRIUM: The atrium was moderately to markedly dilated  RIGHT ATRIUM: The atrium was mildly to moderately dilated  MITRAL VALVE: There was mild annular calcification  Valve structure was normal  There was normal leaflet separation   DOPPLER: The transmitral velocity was within the normal range  There was no evidence for stenosis  There was mild  regurgitation  AORTIC VALVE: The valve was trileaflet  Leaflets exhibited moderate calcification, normal cuspal separation, and sclerosis  DOPPLER: Transaortic velocity was within the normal range  There was no evidence for stenosis  There was mild to  moderate regurgitation  TRICUSPID VALVE: The valve structure was normal  There was normal leaflet separation  DOPPLER: The transtricuspid velocity was within the normal range  There was no evidence for stenosis  There was mild to moderate regurgitation  Pulmonary  artery systolic pressure was moderately increased  Estimated peak PA pressure was 50 mmHg  PULMONIC VALVE: Leaflets exhibited normal thickness, no calcification, and normal cuspal separation  DOPPLER: The transpulmonic velocity was within the normal range  There was trace regurgitation  PERICARDIUM: There was no pericardial effusion  The pericardium was normal in appearance  AORTA: The root exhibited normal size  SYSTEMIC VEINS: IVC: The inferior vena cava was dilated  Respirophasic changes were blunted (less than 50% variation)      SYSTEM MEASUREMENT TABLES    2D  %FS: 34 34 %  Ao Diam: 3 cm  EDV(Teich): 85 94 ml  EF(Teich): 63 63 %  ESV(Teich): 31 25 ml  IVSd: 1 07 cm  LA Area: 34 19 cm2  LA Diam: 5 41 cm  LVEDV MOD A4C: 103 42 ml  LVEF MOD A4C: 67 34 %  LVESV MOD A4C: 33 78 ml  LVIDd: 4 36 cm  LVIDs: 2 86 cm  LVLd A4C: 6 86 cm  LVLs A4C: 5 55 cm  LVPWd: 0 98 cm  RA Area: 22 87 cm2  RVIDd: 3 59 cm  SV MOD A4C: 69 64 ml  SV(Teich): 54 69 ml    CW  AR Dec Bourbon: 2 35 m/s2  AR Dec Time: 1715 76 ms  AR PHT: 497 57 ms  AR Vmax: 4 01 m/s  AR maxP 29 mmHg  TR Vmax: 2 88 m/s  TR maxP 27 mmHg    MM  TAPSE: 1 78 cm    [employer] Accredited Echocardiography Laboratory    Prepared and electronically signed by    Wilton Renee MD  Signed 10-Abundio-2021 15:06:42    No results found for this or any previous visit       CATH:  No results found for this or any previous visit  STRESS TEST:  No results found for this or any previous visit

## 2021-10-04 ENCOUNTER — OFFICE VISIT (OUTPATIENT)
Dept: AUDIOLOGY | Age: 81
End: 2021-10-04
Payer: COMMERCIAL

## 2021-10-04 DIAGNOSIS — H90.3 SENSORY HEARING LOSS, BILATERAL: Primary | ICD-10-CM

## 2021-10-04 DIAGNOSIS — I48.20 CHRONIC ATRIAL FIBRILLATION (HCC): ICD-10-CM

## 2021-10-04 DIAGNOSIS — H91.92 DECREASED HEARING OF LEFT EAR: ICD-10-CM

## 2021-10-04 PROCEDURE — 92557 COMPREHENSIVE HEARING TEST: CPT | Performed by: AUDIOLOGIST-HEARING AID FITTER

## 2021-10-04 PROCEDURE — 92567 TYMPANOMETRY: CPT | Performed by: AUDIOLOGIST-HEARING AID FITTER

## 2021-10-05 RX ORDER — RIVAROXABAN 15 MG/1
TABLET, FILM COATED ORAL
Qty: 90 TABLET | Refills: 3 | Status: ON HOLD | OUTPATIENT
Start: 2021-10-05 | End: 2022-01-17 | Stop reason: SDUPTHER

## 2021-10-14 ENCOUNTER — TELEPHONE (OUTPATIENT)
Dept: GASTROENTEROLOGY | Facility: CLINIC | Age: 81
End: 2021-10-14

## 2021-10-19 ENCOUNTER — APPOINTMENT (OUTPATIENT)
Dept: LAB | Facility: HOSPITAL | Age: 81
End: 2021-10-19
Payer: COMMERCIAL

## 2021-10-19 DIAGNOSIS — I10 ESSENTIAL HYPERTENSION: ICD-10-CM

## 2021-10-19 DIAGNOSIS — E78.5 HYPERLIPIDEMIA LDL GOAL <100: ICD-10-CM

## 2021-10-19 DIAGNOSIS — I25.10 NONOBSTRUCTIVE ATHEROSCLEROSIS OF CORONARY ARTERY: ICD-10-CM

## 2021-10-19 LAB
ANION GAP SERPL CALCULATED.3IONS-SCNC: 1 MMOL/L (ref 4–13)
BUN SERPL-MCNC: 23 MG/DL (ref 5–25)
CALCIUM SERPL-MCNC: 9.4 MG/DL (ref 8.3–10.1)
CHLORIDE SERPL-SCNC: 107 MMOL/L (ref 100–108)
CO2 SERPL-SCNC: 29 MMOL/L (ref 21–32)
CREAT SERPL-MCNC: 1.06 MG/DL (ref 0.6–1.3)
GFR SERPL CREATININE-BSD FRML MDRD: 49 ML/MIN/1.73SQ M
GLUCOSE P FAST SERPL-MCNC: 93 MG/DL (ref 65–99)
POTASSIUM SERPL-SCNC: 3.8 MMOL/L (ref 3.5–5.3)
SODIUM SERPL-SCNC: 137 MMOL/L (ref 136–145)

## 2021-10-19 PROCEDURE — 80048 BASIC METABOLIC PNL TOTAL CA: CPT

## 2021-10-19 PROCEDURE — 36415 COLL VENOUS BLD VENIPUNCTURE: CPT

## 2021-10-19 RX ORDER — ROSUVASTATIN CALCIUM 10 MG/1
10 TABLET, COATED ORAL DAILY
Qty: 90 TABLET | Refills: 0 | Status: SHIPPED | OUTPATIENT
Start: 2021-10-19 | End: 2022-06-02

## 2021-10-23 DIAGNOSIS — J30.89 ALLERGIC RHINITIS DUE TO OTHER ALLERGIC TRIGGER, UNSPECIFIED SEASONALITY: ICD-10-CM

## 2021-10-25 ENCOUNTER — TELEPHONE (OUTPATIENT)
Dept: INTERNAL MEDICINE CLINIC | Facility: CLINIC | Age: 81
End: 2021-10-25

## 2021-10-25 ENCOUNTER — CLINICAL SUPPORT (OUTPATIENT)
Dept: INTERNAL MEDICINE CLINIC | Facility: CLINIC | Age: 81
End: 2021-10-25

## 2021-10-25 DIAGNOSIS — E53.8 B12 DEFICIENCY: Primary | ICD-10-CM

## 2021-10-25 RX ORDER — LORATADINE 10 MG/1
10 TABLET ORAL DAILY
Qty: 30 TABLET | Refills: 0 | Status: SHIPPED | OUTPATIENT
Start: 2021-10-25 | End: 2021-11-19

## 2021-10-25 RX ORDER — CYANOCOBALAMIN 1000 UG/ML
1000 INJECTION INTRAMUSCULAR; SUBCUTANEOUS
Status: DISCONTINUED | OUTPATIENT
Start: 2021-10-25 | End: 2021-11-18

## 2021-10-25 RX ADMIN — CYANOCOBALAMIN 1000 MCG: 1000 INJECTION INTRAMUSCULAR; SUBCUTANEOUS at 09:16

## 2021-10-29 ENCOUNTER — IN-CLINIC DEVICE VISIT (OUTPATIENT)
Dept: CARDIOLOGY CLINIC | Facility: CLINIC | Age: 81
End: 2021-10-29
Payer: COMMERCIAL

## 2021-10-29 DIAGNOSIS — Z95.0 PACEMAKER: Primary | ICD-10-CM

## 2021-10-29 PROCEDURE — 93279 PRGRMG DEV EVAL PM/LDLS PM: CPT | Performed by: INTERNAL MEDICINE

## 2021-11-05 ENCOUNTER — ANESTHESIA EVENT (OUTPATIENT)
Dept: PERIOP | Facility: HOSPITAL | Age: 81
DRG: 331 | End: 2021-11-05
Payer: MEDICARE

## 2021-11-09 DIAGNOSIS — I10 ESSENTIAL HYPERTENSION: ICD-10-CM

## 2021-11-10 ENCOUNTER — TELEPHONE (OUTPATIENT)
Dept: ANESTHESIOLOGY | Facility: CLINIC | Age: 81
End: 2021-11-10

## 2021-11-10 DIAGNOSIS — D36.9 TUBULOVILLOUS ADENOMA: Primary | ICD-10-CM

## 2021-11-15 DIAGNOSIS — E53.8 B12 DEFICIENCY: Chronic | ICD-10-CM

## 2021-11-18 RX ORDER — CYANOCOBALAMIN 1000 UG/ML
1000 INJECTION INTRAMUSCULAR; SUBCUTANEOUS
Qty: 3 ML | Refills: 0 | Status: SHIPPED | OUTPATIENT
Start: 2021-11-18 | End: 2022-01-07

## 2021-11-19 NOTE — PRE-PROCEDURE INSTRUCTIONS
Pre-Surgery Instructions:   Medication Instructions    chlorthalidone 25 mg tablet PT INSTRUCTED TO NOT TAKE ON DAY OF SURGERY     cyanocobalamin 1,000 mcg/mL MONTHLY INJECTION     isosorbide mononitrate (IMDUR) 30 mg 24 hr tablet PT INSTRUCTED TO TAKE ON DAY OF SURGERY WITH 1-2 SIPS OF WATER    losartan (COZAAR) 100 MG tablet PT INSTRUCTED TO NOT TAKE THE DAY BEFORE AND THE DAY OF THE SURGERY     nebivolol (BYSTOLIC) 5 mg tablet PT INSTRUCTED TO TAKE ON THE DAY OF THE SURGERY WITH 1-2 SIPS  OF WATER     rosuvastatin (CRESTOR) 10 MG tablet PT TAKES IN THE EVENING     spironolactone (ALDACTONE) 25 mg tablet PT INSTRUCTED TO NOT TAKE ON DAY OF SURGERY     verapamil (CALAN-SR) 180 mg CR tablet PT TAKES AT HS     Xarelto 15 MG tablet PER PT SHE WAS INSTRUCTED TO STOP 2 DAYS PRIOR TO SURGERY    PT DENIES FEVER, sob, sore throat and cough  Pt instructed to stop nsaids and supplements  prior to surgery  Pt verbalized understanding of shower, med and bowel cleansing instructions she received from surgeons office  Per pt she is aware to take two antibiotics the day before the surgery  Pt instructed to take bystolic on day of surgery with 1-2 sips of water

## 2021-11-22 ENCOUNTER — CLINICAL SUPPORT (OUTPATIENT)
Dept: INTERNAL MEDICINE CLINIC | Facility: CLINIC | Age: 81
End: 2021-11-22

## 2021-11-22 DIAGNOSIS — E53.8 VITAMIN B12 DEFICIENCY: Primary | ICD-10-CM

## 2021-11-30 ENCOUNTER — APPOINTMENT (OUTPATIENT)
Dept: LAB | Facility: HOSPITAL | Age: 81
End: 2021-11-30
Attending: COLON & RECTAL SURGERY
Payer: COMMERCIAL

## 2021-11-30 ENCOUNTER — CONSULT (OUTPATIENT)
Dept: ANESTHESIOLOGY | Facility: CLINIC | Age: 81
End: 2021-11-30
Payer: COMMERCIAL

## 2021-11-30 VITALS
HEART RATE: 77 BPM | DIASTOLIC BLOOD PRESSURE: 68 MMHG | SYSTOLIC BLOOD PRESSURE: 161 MMHG | WEIGHT: 130 LBS | HEIGHT: 63 IN | TEMPERATURE: 97.6 F | BODY MASS INDEX: 23.04 KG/M2

## 2021-11-30 DIAGNOSIS — D36.9 PILIFEROUS CYST: ICD-10-CM

## 2021-11-30 DIAGNOSIS — I48.20 CHRONIC ATRIAL FIBRILLATION (HCC): ICD-10-CM

## 2021-11-30 DIAGNOSIS — D36.9 TUBULOVILLOUS ADENOMA: ICD-10-CM

## 2021-11-30 DIAGNOSIS — D36.9 TUBULAR ADENOMA: ICD-10-CM

## 2021-11-30 DIAGNOSIS — Z95.0 PRESENCE OF CARDIAC PACEMAKER: Chronic | ICD-10-CM

## 2021-11-30 DIAGNOSIS — N18.30 STAGE 3 CHRONIC KIDNEY DISEASE, UNSPECIFIED WHETHER STAGE 3A OR 3B CKD (HCC): Chronic | ICD-10-CM

## 2021-11-30 DIAGNOSIS — I10 ESSENTIAL HYPERTENSION: Primary | Chronic | ICD-10-CM

## 2021-11-30 LAB
ABO GROUP BLD: NORMAL
ANION GAP SERPL CALCULATED.3IONS-SCNC: 8 MMOL/L (ref 4–13)
BASOPHILS # BLD AUTO: 0.04 THOUSANDS/ΜL (ref 0–0.1)
BASOPHILS NFR BLD AUTO: 1 % (ref 0–1)
BLD GP AB SCN SERPL QL: NEGATIVE
BUN SERPL-MCNC: 26 MG/DL (ref 5–25)
CALCIUM SERPL-MCNC: 9.5 MG/DL (ref 8.3–10.1)
CHLORIDE SERPL-SCNC: 106 MMOL/L (ref 100–108)
CO2 SERPL-SCNC: 25 MMOL/L (ref 21–32)
CREAT SERPL-MCNC: 1.03 MG/DL (ref 0.6–1.3)
EOSINOPHIL # BLD AUTO: 0.06 THOUSAND/ΜL (ref 0–0.61)
EOSINOPHIL NFR BLD AUTO: 1 % (ref 0–6)
ERYTHROCYTE [DISTWIDTH] IN BLOOD BY AUTOMATED COUNT: 13.2 % (ref 11.6–15.1)
EST. AVERAGE GLUCOSE BLD GHB EST-MCNC: 108 MG/DL
GFR SERPL CREATININE-BSD FRML MDRD: 51 ML/MIN/1.73SQ M
GLUCOSE P FAST SERPL-MCNC: 89 MG/DL (ref 65–99)
HBA1C MFR BLD: 5.4 %
HCT VFR BLD AUTO: 40.3 % (ref 34.8–46.1)
HGB BLD-MCNC: 13 G/DL (ref 11.5–15.4)
IMM GRANULOCYTES # BLD AUTO: 0.02 THOUSAND/UL (ref 0–0.2)
IMM GRANULOCYTES NFR BLD AUTO: 0 % (ref 0–2)
LYMPHOCYTES # BLD AUTO: 1.46 THOUSANDS/ΜL (ref 0.6–4.47)
LYMPHOCYTES NFR BLD AUTO: 21 % (ref 14–44)
MCH RBC QN AUTO: 30.9 PG (ref 26.8–34.3)
MCHC RBC AUTO-ENTMCNC: 32.3 G/DL (ref 31.4–37.4)
MCV RBC AUTO: 96 FL (ref 82–98)
MONOCYTES # BLD AUTO: 0.65 THOUSAND/ΜL (ref 0.17–1.22)
MONOCYTES NFR BLD AUTO: 9 % (ref 4–12)
NEUTROPHILS # BLD AUTO: 4.8 THOUSANDS/ΜL (ref 1.85–7.62)
NEUTS SEG NFR BLD AUTO: 68 % (ref 43–75)
NRBC BLD AUTO-RTO: 0 /100 WBCS
PLATELET # BLD AUTO: 211 THOUSANDS/UL (ref 149–390)
PMV BLD AUTO: 11.1 FL (ref 8.9–12.7)
POTASSIUM SERPL-SCNC: 3.7 MMOL/L (ref 3.5–5.3)
RBC # BLD AUTO: 4.21 MILLION/UL (ref 3.81–5.12)
RH BLD: POSITIVE
SODIUM SERPL-SCNC: 139 MMOL/L (ref 136–145)
SPECIMEN EXPIRATION DATE: NORMAL
WBC # BLD AUTO: 7.03 THOUSAND/UL (ref 4.31–10.16)

## 2021-11-30 PROCEDURE — 86850 RBC ANTIBODY SCREEN: CPT

## 2021-11-30 PROCEDURE — 85025 COMPLETE CBC W/AUTO DIFF WBC: CPT

## 2021-11-30 PROCEDURE — 83036 HEMOGLOBIN GLYCOSYLATED A1C: CPT

## 2021-11-30 PROCEDURE — 80048 BASIC METABOLIC PNL TOTAL CA: CPT

## 2021-11-30 PROCEDURE — 36415 COLL VENOUS BLD VENIPUNCTURE: CPT

## 2021-11-30 PROCEDURE — 86901 BLOOD TYPING SEROLOGIC RH(D): CPT

## 2021-11-30 PROCEDURE — 99215 OFFICE O/P EST HI 40 MIN: CPT | Performed by: NURSE PRACTITIONER

## 2021-11-30 PROCEDURE — 86900 BLOOD TYPING SEROLOGIC ABO: CPT

## 2021-12-06 ENCOUNTER — OFFICE VISIT (OUTPATIENT)
Dept: PODIATRY | Facility: CLINIC | Age: 81
End: 2021-12-06
Payer: COMMERCIAL

## 2021-12-06 VITALS
BODY MASS INDEX: 24.17 KG/M2 | WEIGHT: 136.4 LBS | HEIGHT: 63 IN | HEART RATE: 70 BPM | DIASTOLIC BLOOD PRESSURE: 80 MMHG | SYSTOLIC BLOOD PRESSURE: 144 MMHG

## 2021-12-06 DIAGNOSIS — B35.1 ONYCHOMYCOSIS: Primary | ICD-10-CM

## 2021-12-06 DIAGNOSIS — M79.675 PAIN IN TOE OF LEFT FOOT: ICD-10-CM

## 2021-12-06 PROCEDURE — 11720 DEBRIDE NAIL 1-5: CPT | Performed by: PODIATRIST

## 2021-12-10 ENCOUNTER — ANESTHESIA (OUTPATIENT)
Dept: PERIOP | Facility: HOSPITAL | Age: 81
DRG: 331 | End: 2021-12-10
Payer: MEDICARE

## 2021-12-13 ENCOUNTER — OFFICE VISIT (OUTPATIENT)
Dept: INTERNAL MEDICINE CLINIC | Facility: CLINIC | Age: 81
End: 2021-12-13

## 2021-12-13 VITALS
HEIGHT: 63 IN | OXYGEN SATURATION: 97 % | WEIGHT: 136 LBS | HEART RATE: 71 BPM | TEMPERATURE: 97.5 F | BODY MASS INDEX: 24.1 KG/M2 | SYSTOLIC BLOOD PRESSURE: 144 MMHG | DIASTOLIC BLOOD PRESSURE: 70 MMHG

## 2021-12-13 DIAGNOSIS — H00.011 HORDEOLUM EXTERNUM OF RIGHT UPPER EYELID: Primary | ICD-10-CM

## 2021-12-13 PROCEDURE — 1160F RVW MEDS BY RX/DR IN RCRD: CPT | Performed by: INTERNAL MEDICINE

## 2021-12-13 PROCEDURE — 99213 OFFICE O/P EST LOW 20 MIN: CPT | Performed by: INTERNAL MEDICINE

## 2021-12-13 PROCEDURE — 3078F DIAST BP <80 MM HG: CPT | Performed by: INTERNAL MEDICINE

## 2021-12-13 PROCEDURE — 3077F SYST BP >= 140 MM HG: CPT | Performed by: INTERNAL MEDICINE

## 2021-12-13 PROCEDURE — 1036F TOBACCO NON-USER: CPT | Performed by: INTERNAL MEDICINE

## 2021-12-13 RX ORDER — POLYVINYL ALCOHOL 14 MG/ML
1 SOLUTION/ DROPS OPHTHALMIC AS NEEDED
Qty: 15 ML | Refills: 0 | Status: SHIPPED | OUTPATIENT
Start: 2021-12-13

## 2021-12-13 NOTE — PROGRESS NOTES
Pt came into the office today for a nurse visit for a vitamin B12 injection  Pt provided the serum  1 0 mL administered on left deltoid       Cyanocobalamin  1,000 mcg/mL  [facility] [id number]  Lot [id number]   Exp 12/01/2019   [employer] Alert-The patient is alert, awake and responds to voice. The patient is oriented to time, place, and person. The triage nurse is able to obtain subjective information.

## 2021-12-14 ENCOUNTER — TELEPHONE (OUTPATIENT)
Dept: CARDIOLOGY CLINIC | Facility: CLINIC | Age: 81
End: 2021-12-14

## 2021-12-14 ENCOUNTER — OFFICE VISIT (OUTPATIENT)
Dept: CARDIOLOGY CLINIC | Facility: CLINIC | Age: 81
End: 2021-12-14
Payer: COMMERCIAL

## 2021-12-14 VITALS
HEIGHT: 63 IN | DIASTOLIC BLOOD PRESSURE: 74 MMHG | WEIGHT: 134.7 LBS | SYSTOLIC BLOOD PRESSURE: 158 MMHG | HEART RATE: 83 BPM | BODY MASS INDEX: 23.87 KG/M2

## 2021-12-14 DIAGNOSIS — I48.20 CHRONIC ATRIAL FIBRILLATION (HCC): Primary | ICD-10-CM

## 2021-12-14 DIAGNOSIS — D36.9 TUBULOVILLOUS ADENOMA: ICD-10-CM

## 2021-12-14 DIAGNOSIS — I10 HYPERTENSION, UNSPECIFIED TYPE: ICD-10-CM

## 2021-12-14 DIAGNOSIS — I70.1 LEFT RENAL ARTERY STENOSIS (HCC): ICD-10-CM

## 2021-12-14 PROCEDURE — 1036F TOBACCO NON-USER: CPT | Performed by: INTERNAL MEDICINE

## 2021-12-14 PROCEDURE — 99214 OFFICE O/P EST MOD 30 MIN: CPT | Performed by: INTERNAL MEDICINE

## 2021-12-14 PROCEDURE — 1160F RVW MEDS BY RX/DR IN RCRD: CPT | Performed by: INTERNAL MEDICINE

## 2021-12-14 PROCEDURE — 93000 ELECTROCARDIOGRAM COMPLETE: CPT | Performed by: INTERNAL MEDICINE

## 2021-12-14 RX ORDER — NEBIVOLOL 5 MG/1
5 TABLET ORAL DAILY
Qty: 90 TABLET | Refills: 3 | Status: SHIPPED | OUTPATIENT
Start: 2021-12-14

## 2021-12-20 ENCOUNTER — CLINICAL SUPPORT (OUTPATIENT)
Dept: INTERNAL MEDICINE CLINIC | Facility: CLINIC | Age: 81
End: 2021-12-20

## 2021-12-20 DIAGNOSIS — E53.8 VITAMIN B12 DEFICIENCY: Primary | ICD-10-CM

## 2021-12-20 PROCEDURE — 96372 THER/PROPH/DIAG INJ SC/IM: CPT | Performed by: INTERNAL MEDICINE

## 2021-12-20 RX ORDER — CYANOCOBALAMIN 1000 UG/ML
1000 INJECTION INTRAMUSCULAR; SUBCUTANEOUS
Status: SHIPPED | OUTPATIENT
Start: 2021-12-20

## 2021-12-20 RX ADMIN — CYANOCOBALAMIN 1000 MCG: 1000 INJECTION INTRAMUSCULAR; SUBCUTANEOUS at 12:38

## 2022-01-11 ENCOUNTER — TELEPHONE (OUTPATIENT)
Dept: INTERNAL MEDICINE CLINIC | Facility: CLINIC | Age: 82
End: 2022-01-11

## 2022-01-11 DIAGNOSIS — E53.8 B12 DEFICIENCY: Primary | ICD-10-CM

## 2022-01-11 NOTE — TELEPHONE ENCOUNTER
Called patient in [facility] [address] [facility], per patient she has been on B12 for a very long time and was told she has to be on B12 injection for lifetime  Patient stated B12 injections were discontinued at one point but then was restarted again  Patient still has 3 vials in our office and per my verbal conversation with Dr Anshul Murillo she should get blood work done prior to getting her next B12 injection  Patient is aware and agreeable to getting blood work prior to B12 injection  When we get the results and are reviewed by one of our providers, will resubmit prior auth with blood work to support the need for the B12 and will inform patient when she can come for her next injection

## 2022-01-11 NOTE — TELEPHONE ENCOUNTER
Received a prior authorization request from [employer] requesting prior authorization for Cyanocobalamin 1000mcg/ML solution  [employer] info:  Key: [id number]  Last name: Kami Tello  : 1940    Prior authorization submitted to [employer] via cover my meds on 22  Will await approval/denial letter

## 2022-01-11 NOTE — TELEPHONE ENCOUNTER
Received a denial from [employer] via [employer]  Will await a denial letter via fax  Dr Anshul Murillo,    Patient's Vitamin B12 injections were denied  We most likely need to see the patient in follow-up and order repeat blood work to ensure it is still needed  They are requiring updated labs and office notes to support that medication is still needed  Please let us know if patient needs to get anything completed prior to making an appointment or how we should proceed in the meantime   Thank you

## 2022-01-11 NOTE — TELEPHONE ENCOUNTER
Vitamin B12 and methylmalonic acid laboratory studies were ordered  Please instruct the patient to obtain this blood work prior to her next appointment  Thank you

## 2022-01-12 ENCOUNTER — APPOINTMENT (OUTPATIENT)
Dept: LAB | Facility: CLINIC | Age: 82
DRG: 331 | End: 2022-01-12
Payer: MEDICARE

## 2022-01-12 DIAGNOSIS — E53.8 B12 DEFICIENCY: ICD-10-CM

## 2022-01-12 LAB — VIT B12 SERPL-MCNC: 1416 PG/ML (ref 100–900)

## 2022-01-12 PROCEDURE — 82607 VITAMIN B-12: CPT

## 2022-01-12 PROCEDURE — 83918 ORGANIC ACIDS TOTAL QUANT: CPT

## 2022-01-12 PROCEDURE — 36415 COLL VENOUS BLD VENIPUNCTURE: CPT

## 2022-01-13 ENCOUNTER — HOSPITAL ENCOUNTER (INPATIENT)
Facility: HOSPITAL | Age: 82
LOS: 4 days | Discharge: HOME | DRG: 331 | End: 2022-01-17
Attending: COLON & RECTAL SURGERY | Admitting: COLON & RECTAL SURGERY
Payer: MEDICARE

## 2022-01-13 DIAGNOSIS — I48.20 CHRONIC ATRIAL FIBRILLATION (HCC): ICD-10-CM

## 2022-01-13 DIAGNOSIS — D36.9 TUBULOVILLOUS ADENOMA: ICD-10-CM

## 2022-01-13 DIAGNOSIS — N18.30 STAGE 3 CHRONIC KIDNEY DISEASE, UNSPECIFIED WHETHER STAGE 3A OR 3B CKD (HCC): Primary | ICD-10-CM

## 2022-01-13 LAB
ABO GROUP BLD: NORMAL
BLD GP AB SCN SERPL QL: NEGATIVE
GLUCOSE SERPL-MCNC: 208 MG/DL (ref 65–140)
PLATELET # BLD AUTO: 190 THOUSANDS/UL (ref 149–390)
PMV BLD AUTO: 10.8 FL (ref 8.9–12.7)
RH BLD: POSITIVE
SPECIMEN EXPIRATION DATE: NORMAL

## 2022-01-13 PROCEDURE — 44204 LAPARO PARTIAL COLECTOMY: CPT | Performed by: COLON & RECTAL SURGERY

## 2022-01-13 PROCEDURE — 88309 TISSUE EXAM BY PATHOLOGIST: CPT | Performed by: PATHOLOGY

## 2022-01-13 PROCEDURE — 86900 BLOOD TYPING SEROLOGIC ABO: CPT | Performed by: COLON & RECTAL SURGERY

## 2022-01-13 PROCEDURE — 0DTF4ZZ RESECTION OF RIGHT LARGE INTESTINE, PERCUTANEOUS ENDOSCOPIC APPROACH: ICD-10-PCS | Performed by: COLON & RECTAL SURGERY

## 2022-01-13 PROCEDURE — 82948 REAGENT STRIP/BLOOD GLUCOSE: CPT

## 2022-01-13 PROCEDURE — 86901 BLOOD TYPING SEROLOGIC RH(D): CPT | Performed by: COLON & RECTAL SURGERY

## 2022-01-13 PROCEDURE — 99222 1ST HOSP IP/OBS MODERATE 55: CPT | Performed by: INTERNAL MEDICINE

## 2022-01-13 PROCEDURE — 85049 AUTOMATED PLATELET COUNT: CPT | Performed by: STUDENT IN AN ORGANIZED HEALTH CARE EDUCATION/TRAINING PROGRAM

## 2022-01-13 PROCEDURE — 86850 RBC ANTIBODY SCREEN: CPT | Performed by: COLON & RECTAL SURGERY

## 2022-01-13 RX ORDER — POLYETHYLENE GLYCOL 3350 17 G/17G
255 POWDER, FOR SOLUTION ORAL ONCE
Status: DISCONTINUED | OUTPATIENT
Start: 2022-01-13 | End: 2022-01-13

## 2022-01-13 RX ORDER — HEPARIN SODIUM 5000 [USP'U]/ML
5000 INJECTION, SOLUTION INTRAVENOUS; SUBCUTANEOUS ONCE
Status: COMPLETED | OUTPATIENT
Start: 2022-01-13 | End: 2022-01-13

## 2022-01-13 RX ORDER — FENTANYL CITRATE/PF 50 MCG/ML
25 SYRINGE (ML) INJECTION
Status: DISCONTINUED | OUTPATIENT
Start: 2022-01-13 | End: 2022-01-13 | Stop reason: HOSPADM

## 2022-01-13 RX ORDER — HEPARIN SODIUM 5000 [USP'U]/ML
5000 INJECTION, SOLUTION INTRAVENOUS; SUBCUTANEOUS EVERY 8 HOURS SCHEDULED
Status: DISCONTINUED | OUTPATIENT
Start: 2022-01-13 | End: 2022-01-17 | Stop reason: HOSPADM

## 2022-01-13 RX ORDER — ONDANSETRON 2 MG/ML
INJECTION INTRAMUSCULAR; INTRAVENOUS AS NEEDED
Status: DISCONTINUED | OUTPATIENT
Start: 2022-01-13 | End: 2022-01-13

## 2022-01-13 RX ORDER — SODIUM CHLORIDE 9 MG/ML
INJECTION, SOLUTION INTRAVENOUS CONTINUOUS PRN
Status: DISCONTINUED | OUTPATIENT
Start: 2022-01-13 | End: 2022-01-13

## 2022-01-13 RX ORDER — CEFAZOLIN SODIUM 1 G/50ML
1000 SOLUTION INTRAVENOUS ONCE
Status: COMPLETED | OUTPATIENT
Start: 2022-01-13 | End: 2022-01-13

## 2022-01-13 RX ORDER — MAGNESIUM HYDROXIDE 1200 MG/15ML
LIQUID ORAL AS NEEDED
Status: DISCONTINUED | OUTPATIENT
Start: 2022-01-13 | End: 2022-01-13 | Stop reason: HOSPADM

## 2022-01-13 RX ORDER — PROPOFOL 10 MG/ML
INJECTION, EMULSION INTRAVENOUS AS NEEDED
Status: DISCONTINUED | OUTPATIENT
Start: 2022-01-13 | End: 2022-01-13

## 2022-01-13 RX ORDER — ACETAMINOPHEN 325 MG/1
975 TABLET ORAL ONCE
Status: COMPLETED | OUTPATIENT
Start: 2022-01-13 | End: 2022-01-13

## 2022-01-13 RX ORDER — ONDANSETRON 2 MG/ML
4 INJECTION INTRAMUSCULAR; INTRAVENOUS EVERY 6 HOURS PRN
Status: DISCONTINUED | OUTPATIENT
Start: 2022-01-13 | End: 2022-01-17 | Stop reason: HOSPADM

## 2022-01-13 RX ORDER — HYDROMORPHONE HCL 110MG/55ML
PATIENT CONTROLLED ANALGESIA SYRINGE INTRAVENOUS AS NEEDED
Status: DISCONTINUED | OUTPATIENT
Start: 2022-01-13 | End: 2022-01-13

## 2022-01-13 RX ORDER — HYDROMORPHONE HCL IN WATER/PF 6 MG/30 ML
0.2 PATIENT CONTROLLED ANALGESIA SYRINGE INTRAVENOUS
Status: DISCONTINUED | OUTPATIENT
Start: 2022-01-13 | End: 2022-01-17 | Stop reason: HOSPADM

## 2022-01-13 RX ORDER — ONDANSETRON 2 MG/ML
4 INJECTION INTRAMUSCULAR; INTRAVENOUS ONCE AS NEEDED
Status: COMPLETED | OUTPATIENT
Start: 2022-01-13 | End: 2022-01-13

## 2022-01-13 RX ORDER — DEXAMETHASONE SODIUM PHOSPHATE 10 MG/ML
INJECTION, SOLUTION INTRAMUSCULAR; INTRAVENOUS AS NEEDED
Status: DISCONTINUED | OUTPATIENT
Start: 2022-01-13 | End: 2022-01-13

## 2022-01-13 RX ORDER — HYDROMORPHONE HCL IN WATER/PF 6 MG/30 ML
0.2 PATIENT CONTROLLED ANALGESIA SYRINGE INTRAVENOUS
Status: DISCONTINUED | OUTPATIENT
Start: 2022-01-13 | End: 2022-01-13 | Stop reason: HOSPADM

## 2022-01-13 RX ORDER — ROCURONIUM BROMIDE 10 MG/ML
INJECTION, SOLUTION INTRAVENOUS AS NEEDED
Status: DISCONTINUED | OUTPATIENT
Start: 2022-01-13 | End: 2022-01-13

## 2022-01-13 RX ORDER — SODIUM CHLORIDE, SODIUM LACTATE, POTASSIUM CHLORIDE, CALCIUM CHLORIDE 600; 310; 30; 20 MG/100ML; MG/100ML; MG/100ML; MG/100ML
125 INJECTION, SOLUTION INTRAVENOUS CONTINUOUS
Status: DISCONTINUED | OUTPATIENT
Start: 2022-01-13 | End: 2022-01-13 | Stop reason: HOSPADM

## 2022-01-13 RX ORDER — METOCLOPRAMIDE HYDROCHLORIDE 5 MG/ML
10 INJECTION INTRAMUSCULAR; INTRAVENOUS ONCE
Status: COMPLETED | OUTPATIENT
Start: 2022-01-13 | End: 2022-01-13

## 2022-01-13 RX ORDER — LIDOCAINE HYDROCHLORIDE 10 MG/ML
INJECTION, SOLUTION EPIDURAL; INFILTRATION; INTRACAUDAL; PERINEURAL AS NEEDED
Status: DISCONTINUED | OUTPATIENT
Start: 2022-01-13 | End: 2022-01-13

## 2022-01-13 RX ORDER — NEBIVOLOL 5 MG/1
5 TABLET ORAL DAILY
Status: DISCONTINUED | OUTPATIENT
Start: 2022-01-14 | End: 2022-01-17 | Stop reason: HOSPADM

## 2022-01-13 RX ORDER — SODIUM CHLORIDE, SODIUM LACTATE, POTASSIUM CHLORIDE, CALCIUM CHLORIDE 600; 310; 30; 20 MG/100ML; MG/100ML; MG/100ML; MG/100ML
125 INJECTION, SOLUTION INTRAVENOUS CONTINUOUS
Status: DISCONTINUED | OUTPATIENT
Start: 2022-01-13 | End: 2022-01-14

## 2022-01-13 RX ORDER — FENTANYL CITRATE 50 UG/ML
INJECTION, SOLUTION INTRAMUSCULAR; INTRAVENOUS AS NEEDED
Status: DISCONTINUED | OUTPATIENT
Start: 2022-01-13 | End: 2022-01-13

## 2022-01-13 RX ORDER — ACETAMINOPHEN 325 MG/1
975 TABLET ORAL EVERY 8 HOURS SCHEDULED
Status: DISCONTINUED | OUTPATIENT
Start: 2022-01-13 | End: 2022-01-17 | Stop reason: HOSPADM

## 2022-01-13 RX ADMIN — HYDROMORPHONE HYDROCHLORIDE 0.5 MG: 2 INJECTION, SOLUTION INTRAMUSCULAR; INTRAVENOUS; SUBCUTANEOUS at 12:53

## 2022-01-13 RX ADMIN — Medication: at 13:54

## 2022-01-13 RX ADMIN — SODIUM CHLORIDE, SODIUM LACTATE, POTASSIUM CHLORIDE, AND CALCIUM CHLORIDE 125 ML/HR: .6; .31; .03; .02 INJECTION, SOLUTION INTRAVENOUS at 14:01

## 2022-01-13 RX ADMIN — ONDANSETRON 4 MG: 2 INJECTION INTRAMUSCULAR; INTRAVENOUS at 13:34

## 2022-01-13 RX ADMIN — HEPARIN SODIUM 5000 UNITS: 5000 INJECTION INTRAVENOUS; SUBCUTANEOUS at 10:38

## 2022-01-13 RX ADMIN — PROPOFOL 150 MG: 10 INJECTION, EMULSION INTRAVENOUS at 11:09

## 2022-01-13 RX ADMIN — CEFAZOLIN SODIUM 1000 MG: 1 SOLUTION INTRAVENOUS at 11:20

## 2022-01-13 RX ADMIN — SUGAMMADEX 236 MG: 100 INJECTION, SOLUTION INTRAVENOUS at 12:56

## 2022-01-13 RX ADMIN — ROCURONIUM BROMIDE 20 MG: 50 INJECTION, SOLUTION INTRAVENOUS at 12:22

## 2022-01-13 RX ADMIN — METOCLOPRAMIDE HYDROCHLORIDE 10 MG: 5 INJECTION INTRAMUSCULAR; INTRAVENOUS at 14:56

## 2022-01-13 RX ADMIN — FENTANYL CITRATE 50 MCG: 50 INJECTION INTRAMUSCULAR; INTRAVENOUS at 11:09

## 2022-01-13 RX ADMIN — HEPARIN SODIUM 5000 UNITS: 5000 INJECTION INTRAVENOUS; SUBCUTANEOUS at 21:25

## 2022-01-13 RX ADMIN — FENTANYL CITRATE 50 MCG: 50 INJECTION INTRAMUSCULAR; INTRAVENOUS at 11:37

## 2022-01-13 RX ADMIN — SODIUM CHLORIDE: 0.9 INJECTION, SOLUTION INTRAVENOUS at 11:13

## 2022-01-13 RX ADMIN — DEXAMETHASONE SODIUM PHOSPHATE 4 MG: 10 INJECTION, SOLUTION INTRAMUSCULAR; INTRAVENOUS at 11:35

## 2022-01-13 RX ADMIN — ONDANSETRON 4 MG: 2 INJECTION INTRAMUSCULAR; INTRAVENOUS at 11:35

## 2022-01-13 RX ADMIN — LIDOCAINE HYDROCHLORIDE 40 MG: 10 INJECTION, SOLUTION EPIDURAL; INFILTRATION; INTRACAUDAL; PERINEURAL at 11:09

## 2022-01-13 RX ADMIN — SODIUM CHLORIDE, SODIUM LACTATE, POTASSIUM CHLORIDE, AND CALCIUM CHLORIDE 125 ML/HR: .6; .31; .03; .02 INJECTION, SOLUTION INTRAVENOUS at 20:35

## 2022-01-13 RX ADMIN — SODIUM CHLORIDE, SODIUM LACTATE, POTASSIUM CHLORIDE, AND CALCIUM CHLORIDE 125 ML/HR: .6; .31; .03; .02 INJECTION, SOLUTION INTRAVENOUS at 09:05

## 2022-01-13 RX ADMIN — PHENYLEPHRINE HYDROCHLORIDE 20 MCG/MIN: 10 INJECTION INTRAVENOUS at 11:40

## 2022-01-13 RX ADMIN — METRONIDAZOLE 500 MG: 500 INJECTION, SOLUTION INTRAVENOUS at 11:20

## 2022-01-13 RX ADMIN — ACETAMINOPHEN 975 MG: 325 TABLET, FILM COATED ORAL at 08:41

## 2022-01-13 RX ADMIN — SODIUM CHLORIDE: 0.9 INJECTION, SOLUTION INTRAVENOUS at 12:34

## 2022-01-13 RX ADMIN — PHENYLEPHRINE HYDROCHLORIDE 20 MCG/MIN: 10 INJECTION INTRAVENOUS at 11:57

## 2022-01-13 RX ADMIN — ROCURONIUM BROMIDE 50 MG: 50 INJECTION, SOLUTION INTRAVENOUS at 11:09

## 2022-01-13 RX ADMIN — ROCURONIUM BROMIDE 10 MG: 50 INJECTION, SOLUTION INTRAVENOUS at 11:37

## 2022-01-13 NOTE — CONSULTS
Consultation - Nephrology   Errol Storm 80 y o  female MRN: [id number]  Unit/Bed#: OR [facility] Encounter: [id number]    ASSESSMENT/PLAN:   1  CKD III/Perioperative renal risk reduction protocol : baseline creatinine 0 9-1 with GFR <60  Last creatinine on 11/30 was 1 03   Avoid NSAIDs, IV dye or other nephrotoxins    Avoid post op hypotension    Continue IVF per protocol    Hold home losartan and chlorthalidone for now    Maintain contreras catheter per protocol    Check am BMP   2  Tubulovillous adenoma s/p laparoscopic R colectomy   3  Hypertension: BP acceptable  Hold losartan and chlorthalidone for now   4  Afib     HISTORY OF PRESENT ILLNESS:  Requesting Physician: Dorie Espinosa MD  Reason for Consult: Perioperative DARRIUS prevention protocol     Errol Storm is a 80y o  year old female  with a history of tubulovillous adenoma found on colonoscopy who was admitted to [facility] for an elective laparoscopic R colectomy  Nephrology was consulted as part of the perioperative DARRIUS prevention protocol  Patient was seen and examined post operatively  She currently complains of feeling dizzy but her BP is normal  She denies any chest pain, shortness of breath, nausea, vomiting or diarrhea  Contreras catheter in place with good urine output  PAST MEDICAL HISTORY:  Past Medical History:   Diagnosis Date    A-fib ([address] )     Anemia     Arthritis     Breast pain, right     Chest pain on breathing     Colon polyp     Cough     Diverticulosis     Encounter for screening colonoscopy     H/O sick sinus syndrome     Heart murmur     High cholesterol     History of atrial fibrillation     Rate ontrolled  On xarelto 15mg (creatinine 50) Followed by Dr Bryan German with Cardiology     History of weight loss     Report loose fitting clothes  Weight stable (3lbs difference)  Last colo showed small tubular adenoma x2  Breast biopsy last showed fibrocystic changes  TSH wnl  Will check cbc, bmp, spep          Hx of long term use of blood thinners     Hypertension     Irregular heart beat     Pacemaker     Preop examination     Shortness of breath     Viral bronchitis        PAST SURGICAL HISTORY:  Past Surgical History:   Procedure Laterality Date    BREAST BIOPSY Right 08/17/2006    ultrasound guided Percutaneous Needle Core -Benign    CATARACT EXTRACTION      CATARACT EXTRACTION W/ INTRAOCULAR LENS  IMPLANT, BILATERAL      COLONOSCOPY      COLONOSCOPY N/A 5/22/2018    Procedure: COLONOSCOPY;  Surgeon: Shabnam Corbin DO;  [facility]; Service: Gastroenterology    Kandacepatito Flannery / Pan Slipper / Graciela Jessica Right     as a child after a fall    MAMMO STEREOTACTIC BREAST BIOPSY RIGHT (ALL INC) Right 10/2014    benign    CA CMBND ANTERPOST COLPORRAPHY W/CYSTO N/A 3/17/2016    Procedure: COLPORRHAPHY ANTERIOR POSTERIOR ;  Surgeon: Pal Doan MD;  [facility];  Service: Gynecology    CA COLONOSCOPY FLX DX W/[facility] WHEN PFRMD N/A 4/4/2019    Procedure: COLONOSCOPY;  Surgeon: Shabnam Corbin DO;  [facility]; Service: Gastroenterology    CA CYSTOURETHROSCOPY N/A 3/17/2016    Procedure: CYSTOSCOPY;  Surgeon: Pal Doan MD;  [facility];  Service: Gynecology    CA ESOPHAGOGASTRODUODENOSCOPY TRANSORAL DIAGNOSTIC N/A 4/16/2018    Procedure: EGD AND COLONOSCOPY;  Surgeon: Shabnam Corbin DO;  [facility];   Service: Gastroenterology    CA Kaitlyn Huge LIG N/A 3/17/2016    Procedure: COLPOPEXY VAGINAL EXTRAPERITONEAL (VEC) ANTERIOR ;  Surgeon: Pal Doan MD;  [facility];  Service: Gynecology    CA SLING OPER STRES INCONTINENCE N/A 3/17/2016    Procedure: INSERTION PUBOVAGINAL SLING SINGLE INCISION ;  Surgeon: Pal Doan MD;  [facility];  Service: Gynecology       ALLERGIES:  Allergies   Allergen Reactions    Other Itching     Bandaids    Tape [Medical Tape] Itching SOCIAL HISTORY:  Social History     Substance and Sexual Activity   Alcohol Use No     Social History     Substance and Sexual Activity   Drug Use No     Social History     Tobacco Use   Smoking Status Never Smoker   Smokeless Tobacco Never Used       FAMILY HISTORY:  Family History   Problem Relation Age of Onset    Diabetes Mother     Breast cancer Sister 48    No Known Problems Father     No Known Problems Maternal Grandmother     No Known Problems Maternal Grandfather     No Known Problems Paternal Grandmother     No Known Problems Paternal Grandfather     No Known Problems Daughter     No Known Problems Son     No Known Problems Sister     No Known Problems Sister     No Known Problems Brother     No Known Problems Brother     No Known Problems Sister     No Known Problems Paternal Aunt     No Known Problems Paternal Aunt     No Known Problems Paternal Aunt     No Known Problems Paternal Aunt        MEDICATIONS:  Scheduled Meds:  Current Facility-Administered Medications   Medication Dose Route Frequency Provider Last Rate    fentaNYL  25 mcg Intravenous Q3 min PRN Josee Galindo CRNA      HYDROmorphone   Intravenous Continuous Phill Tirado MD      HYDROmorphone  0 2 mg Intravenous Q5 Min PRN Josee Galindo CRNA      lactated ringers  125 mL/hr Intravenous Continuous J J Carlos Johnston MD Stopped (01/13/22 1322)    lactated ringers  125 mL/hr Intravenous Continuous Phill Tirado  mL/hr (01/13/22 1401)       PRN Meds:   fentaNYL    HYDROmorphone    Continuous Infusions:HYDROmorphone,   lactated ringers, 125 mL/hr, Last Rate: Stopped (01/13/22 1322)  lactated ringers, 125 mL/hr, Last Rate: 125 mL/hr (01/13/22 1401)        REVIEW OF SYSTEMS:  A complete review of systems was done  Pertinent positives and negatives noted in the HPI but otherwise the review of systems is negative      PHYSICAL EXAM:  Current Weight: Weight - Scale: 59 kg (130 lb)  First Weight: Weight - Scale: 59 kg (130 lb)  Vitals:    01/13/22 1345   BP:    Pulse:    Resp: 20   Temp:    SpO2:        Intake/Output Summary (Last 24 hours) at 1/13/2022 1421  Last data filed at 1/13/2022 1401  Gross per 24 hour   Intake 2200 ml   Output 20 ml   Net 2180 ml     General:  No acute distress  Skin:  No rash  Eyes:  Sclerae anicteric  ENT:  Moist mucous membranes  Neck:  Trachea midline   Chest:  Clear to auscultation bilaterally  CVS:  Regular rate and rhythm  Abdomen: post op tenderness   Extremities:  No edema  Neuro:  Awake and alert  Psych:  Appropriate affect    Lab Results:         Invalid input(s): ALBUMIN    Radiology Results:   No orders to display

## 2022-01-13 NOTE — OP NOTE
PERATIVE REPORT  PATIENT NAME: Hayder Carey    :  1940  MRN: [id number]  Pt Location: [facility] OR ROOM 05    SURGERY DATE: 2022    Surgeon(s) and Role:     * Judy Kay MD - Primary     * Henrique Ferrer MD - Assisting    Preop Diagnosis:  Tubulovillous adenoma [D36 9]    Post-Op Diagnosis Codes:     * Tubulovillous adenoma [D36 9]    Procedure(s) (LRB):  Diagnostic laparoscopy, laparscopic right colectomy (Right)    Specimen(s):  ID Type Source Tests Collected by Time Destination   1 : right colectomy Tissue Large Intestine, Right/Ascending Colon TISSUE EXAM Judy Kay MD 2022 1242      Estimated Blood Loss:   Minimal    Drains:  Urethral Catheter Latex 16 Fr  (Active)   Number of days: 0     Anesthesia Type:   General    Operative Indications:  Tubulovillous adenoma [D36 9]    Operative Findings:  -mid ascending colon tattoo located, mobile cecum appendix in the right upper quadrant  -ileum to midtransverse colon ileocolic side-to-side functional end-to-end stapled anastomosis    Complications:   None    Procedure and Technique:  81yo female multiple colonoscopy with ascending colon polyp tubulovillous adenoma, high grade dysplasia 2019, we discussed multiple recurrences and would recommend surgical resection at this time for complete resection as well as potential for underlying malignancy with oncologic resection      Discussed laparoscopic possible open right hemicolectomy, in a face-to-face, personal, informed consent process, the benefits, alternatives, risks including not limited to bleeding, infection, risks of anesthesia, open surgery, DVT/PE, heart attack, stroke, death, damage to local structures(e g  ureter, duodenum),anastomotic leak requiring reoperation, temporary versus permanent stoma  They understood these risks, signed informed consent, and wish to proceed  She was brought to the operating room where general endotracheal anesthesia was induced without event  Holly was placed under sterile conditions  She was placed in modified lithotomy position with attention to joints and bony extremities,  received 5000 units of subcutaneous heparin prior to operation, Venodynes were on and running throughout the procedure  She received cefazolin and Flagyl prior to skin incision  He was chlorhexidine sterile prepped and after appropriate drying time Ioban and sterile draped  After timeout was taken per protocol procedure began     Incision was made for a 12 mm Naidu trocar infraumbilical  This was carried through with electrocautery and using S retractors the fascia was exposed and incised  Once the peritoneal cavity was entered balloon secured the 12 mm Naidu trocar, 15 mm CO2 pneumoperitoneum was introduced  Diagnostic laparoscopy revealed mid ascending tattoo, mobile cecum with RUQ appendix  2 additional 5 mm trocars were placed in the left upper quadrant and left lower quadrant     The appendix was grasped and raised cephalad  The ileocolic pedicle was easily visualized and skeletonized sweeping down the retroperitoneal structures including easily visualized duodenum  This was ligated high between triple burns of the Enseal energy device ×2  The medial lateral dissection was then continued in this plane sweeping all retroperitoneal structures down and away until hepatic flexure was encountered and the duodenum was swept down  The mesentery to this area was also taken between double burns of the Enseal energy device  The lateral dissection was undertaken on the white line of Toldt and medializing the entire right colon until it was ready for anastomosis  A 5 cm extraction incision was made extending from the 12 mm trocar  An Jovany wound retractor was immediately placed to protect the wound and the previously grasped and marked small bowel was brought into the extraction incision  The then grasped appendix was brought into the incision and inspected   The ileum and transverse colon were lined up on the antimesenteric side of the small bowel and on the antimesenteric taenia of the large bowel  With a 3-0 Vicryl suture  Enterotomies were made and MITA blue load 100 was placed and after orientation and appropriate compression time fired  This was opened showing good common channel and no hemorrhage  The Allis clamps were then used to close this open end and bring it through a repeat firing after appropriate compression time  This was removed passed off as specimen the corners and crossing staple lines and crotch stitch were also placed in the similar fashion with 3-0 Vicryl suture on this anastomosis  Irrigation was undertaken and ran clean  The colon bundle closing tray was used to change all gloves and provide a clean field for closure which was undertaken with fascial #1 PDS  Skin was closed after irrigation with 4-0 Monocryl and Histoacryl glue at the incision and the 2 remaining 5 mm port sites      All sponge needle instrument/RF wand counts were correct I was present and scrubbed for the entirety of the procedure    Patient Disposition:  PACU       SIGNATURE: Ashley Sorensen MD  DATE: January 13, 2022  TIME: 1:00 PM

## 2022-01-13 NOTE — H&P
History and Physical   Colon and Rectal Surgery   Sukumar Burgos 80 y o  female MRN: [id number]  Unit/Bed#: [facility] Encounter: [id number]  01/13/22   10:36 AM      No chief complaint on file        ASSESSMENT:    -Multiple colonoscopy with ascending colon polyp tubulovillous adenoma, high grade dysplasia 2019, we discussed multiple recurrences and would recommend surgical resection at this time for complete resection as well as potential for underlying malignancy with oncologic resection      Discussed laparoscopic possible open right hemicolectomy, in a face-to-face, personal, informed consent process, the benefits, alternatives, risks including not limited to bleeding, infection, risks of anesthesia, open surgery, DVT/PE, heart attack, stroke, death, damage to local structures(e g  ureter, duodenum),anastomotic leak requiring reoperation, temporary versus permanent stoma  They understood these risks, signed informed consent, and wish to proceed      PLAN:  Laparoscopic possible open right hemicolectomy    History of Present Illness   HPI:  Sukumar Burgos is a 80 y o  female who presents for surgery today as discussed at office  Historical Information   Past Medical History:   Diagnosis Date    A-fib ([facility] [address]     Anemia     Arthritis     Breast pain, right     Chest pain on breathing     Colon polyp     Cough     Diverticulosis     Encounter for screening colonoscopy     H/O sick sinus syndrome     Heart murmur     High cholesterol     History of atrial fibrillation     Rate ontrolled  On xarelto 15mg (creatinine 50) Followed by Dr Peter Night with Cardiology     History of weight loss     Report loose fitting clothes  Weight stable (3lbs difference)  Last colo showed small tubular adenoma x2  Breast biopsy last showed fibrocystic changes  TSH wnl  Will check cbc, bmp, spep          Hx of long term use of blood thinners     Hypertension     Irregular heart beat     Pacemaker     Preop examination  Shortness of breath     Viral bronchitis      Past Surgical History:   Procedure Laterality Date    BREAST BIOPSY Right 08/17/2006    ultrasound guided Percutaneous Needle Core -Benign    CATARACT EXTRACTION      CATARACT EXTRACTION W/ INTRAOCULAR LENS  IMPLANT, BILATERAL      COLONOSCOPY      COLONOSCOPY N/A 5/22/2018    Procedure: COLONOSCOPY;  Surgeon: Jose Powell DO;  [facility]; Service: Gastroenterology    [place] / Justin Blanca / Burton Ashley Right     as a child after a fall    MAMMO STEREOTACTIC BREAST BIOPSY RIGHT (ALL INC) Right 10/2014    benign    MD CMBND ANTERPOST COLPORRAPHY W/CYSTO N/A 3/17/2016    Procedure: COLPORRHAPHY ANTERIOR POSTERIOR ;  Surgeon: Victor Manuel Noriega MD;  [facility];  Service: Gynecology    MD COLONOSCOPY FLX DX W/[facility]ABILIT[facility] WHEN PFRMD N/A 4/4/2019    Procedure: COLONOSCOPY;  Surgeon: Jose Powell DO;  [facility]; Service: Gastroenterology    MD CYSTOURETHROSCOPY N/A 3/17/2016    Procedure: CYSTOSCOPY;  Surgeon: Victor Manuel Noriega MD;  [facility];  Service: Gynecology    MD ESOPHAGOGASTRODUODENOSCOPY TRANSORAL DIAGNOSTIC N/A 4/16/2018    Procedure: EGD AND COLONOSCOPY;  Surgeon: Jose Powell DO;  [facility];   Service: Gastroenterology    MD Gavi Vera LIG N/A 3/17/2016    Procedure: COLPOPEXY VAGINAL EXTRAPERITONEAL (VEC) ANTERIOR ;  Surgeon: Victor Manuel Noriega MD;  [facility];  Service: Gynecology    MD SLING OPER STRES INCONTINENCE N/A 3/17/2016    Procedure: INSERTION PUBOVAGINAL SLING SINGLE INCISION ;  Surgeon: Victor Manuel Noriega MD;  [facility];  Service: Gynecology       Meds/Allergies     Facility-Administered Medications Prior to Admission   Medication    cyanocobalamin injection 1,000 mcg     Medications Prior to Admission   Medication    chlorthalidone 25 mg tablet    losartan (COZAAR) 100 MG tablet    magnesium oxide (MAG-OX) 400 mg    nebivolol (BYSTOLIC) 5 mg tablet    polyvinyl alcohol (LIQUIFILM TEARS) 1 4 % ophthalmic solution    rosuvastatin (CRESTOR) 10 MG tablet    verapamil (CALAN-SR) 180 mg CR tablet    Xarelto 15 MG tablet    Blood Pressure Monitoring (Blood Pressure Cuff) MISC    cyanocobalamin 1,000 mcg/mL         Current Facility-Administered Medications:     ceFAZolin (ANCEF) IVPB (premix in dextrose) 1,000 mg 50 mL, 1,000 mg, Intravenous, Once **AND** metroNIDAZOLE (FLAGYL) IVPB (premix) 500 mg 100 mL, 500 mg, Intravenous, Once, Terence Pacheco MD    heparin (porcine) subcutaneous injection 5,000 Units, 5,000 Units, Subcutaneous, Once, Terence Pacheco MD    lactated ringers infusion, 125 mL/hr, Intravenous, Continuous, Jarrett Griffith MD, Last Rate: 125 mL/hr at 01/13/22 0905, 125 mL/hr at 01/13/22 0905    Allergies   Allergen Reactions    Other Itching     Bandaids    Tape [Medical Tape] Itching         Social History   Social History     Substance and Sexual Activity   Alcohol Use No     Social History     Substance and Sexual Activity   Drug Use No     Social History     Tobacco Use   Smoking Status Never Smoker   Smokeless Tobacco Never Used         Family History:   Family History   Problem Relation Age of Onset    Diabetes Mother     Breast cancer Sister 48    No Known Problems Father     No Known Problems Maternal Grandmother     No Known Problems Maternal Grandfather     No Known Problems Paternal Grandmother     No Known Problems Paternal Grandfather     No Known Problems Daughter     No Known Problems Son     No Known Problems Sister     No Known Problems Sister     No Known Problems Brother     No Known Problems Brother     No Known Problems Sister     No Known Problems Paternal Aunt     No Known Problems Paternal Aunt     No Known Problems Paternal Aunt     No Known Problems Paternal Aunt        Objective   Current Vitals:   Blood Pressure: 121/61 (01/13/22 7492)  Pulse: 78 (01/13/22 0824)  Temperature: (!) 97 4 °F (36 3 °C) (01/13/22 0824)  Temp Source: Temporal (01/13/22 0824)  Respirations: 20 (01/13/22 0824)  Height: 5' 3" (160 cm) (01/13/22 0824)  Weight - Scale: 59 kg (130 lb) (01/13/22 0824)  SpO2: 97 % (01/13/22 0824)  No intake or output data in the 24 hours ending 01/13/22 1036    Physical Exam:  General:no distress  Eyes:perrla/eomi  ENT:moist mucus membranes  Neck:supple  Pulm:no increased work of breathing  CV:sinus  Abdomen:soft,nontender  Extremities:no edema

## 2022-01-13 NOTE — ANESTHESIA POSTPROCEDURE EVALUATION
Post-Op Assessment Note    CV Status:  Stable  Pain Score: 0    Pain management: adequate     Mental Status:  Alert and awake   Hydration Status:  Euvolemic   PONV Controlled:  Controlled   Airway Patency:  Patent      Post Op Vitals Reviewed: Yes      Staff: Anesthesiologist, CRNA         No complications documented      BP   139/66   Temp  97 7   Pulse  67   Resp   16   SpO2   100

## 2022-01-13 NOTE — RESPIRATORY THERAPY NOTE
resp care      01/13/22 1540   Respiratory Assessment   Resp Comments pt on PSN  Capnography not needed per order

## 2022-01-13 NOTE — ANESTHESIA PREPROCEDURE EVALUATION
Procedure:  RESECTION COLON RIGHT LAPAROSCOPIC (Right Abdomen)    Relevant Problems   CARDIO   (+) Chronic atrial fibrillation (HCC)   (+) Essential hypertension   (+) Hyperlipidemia LDL goal <100      GI/HEPATIC   (+) Gastroesophageal reflux disease without esophagitis      /RENAL   (+) Stage 3 chronic kidney disease (HCC)      MUSCULOSKELETAL   (+) Osteoarthritis of both wrists   (+) Osteoarthritis of hip      Pacemaker    A-fib (HCC)    Hypertension    High cholesterol    Anemia    Arthritis    Irregular heart beat    Shortness of breath    Hx of long term use of blood thinners    Heart murmur    Chest pain on breathing    H/O sick sinus syndrome    History of atrial fibrillation Rate ontrolled  On xarelto 15mg (creatinine 50) Followed by Dr Katie Cespedes with Cardiology      LEFT VENTRICLE:  Systolic function was normal  Ejection fraction was estimated to be 60 %  There were no regional wall motion abnormalities  Wall thickness was mildly increased  There was mild concentric hypertrophy      LEFT ATRIUM:  The atrium was moderately to markedly dilated      RIGHT ATRIUM:  The atrium was mildly to moderately dilated      MITRAL VALVE:  There was mild annular calcification  There was mild regurgitation      AORTIC VALVE:  The valve was trileaflet  Leaflets exhibited moderate calcification, normal cuspal separation, and sclerosis  There was mild to moderate regurgitation      TRICUSPID VALVE:  There was mild to moderate regurgitation  Pulmonary artery systolic pressure was moderately increased  Estimated peak PA pressure was 50 mmHg      IVC, HEPATIC VEINS:  The inferior vena cava was dilated    Respirophasic changes were blunted (less than 50% variation)        Physical Exam    Airway    Mallampati score: II  TM Distance: >3 FB  Neck ROM: full     Dental       Cardiovascular  Cardiovascular exam normal    Pulmonary  Pulmonary exam normal     Other Findings        Anesthesia Plan  ASA Score- 3     Anesthesia Type- general with ASA Monitors  Additional Monitors:   Airway Plan: ETT  Plan Factors-Exercise tolerance (METS): >4 METS  Chart reviewed  EKG reviewed  Imaging results reviewed  Existing labs reviewed  Patient summary reviewed  Induction- intravenous  Postoperative Plan- Plan for postoperative opioid use  Planned trial extubation    Informed Consent- Anesthetic plan and risks discussed with patient  I personally reviewed this patient with the CRNA  Discussed and agreed on the Anesthesia Plan with the CRNA  Ananya Carl

## 2022-01-14 LAB
ANION GAP SERPL CALCULATED.3IONS-SCNC: 7 MMOL/L (ref 4–13)
ATRIAL RATE: 60 BPM
BASOPHILS # BLD AUTO: 0.01 THOUSANDS/ΜL (ref 0–0.1)
BASOPHILS NFR BLD AUTO: 0 % (ref 0–1)
BUN SERPL-MCNC: 14 MG/DL (ref 5–25)
CALCIUM SERPL-MCNC: 8.5 MG/DL (ref 8.3–10.1)
CHLORIDE SERPL-SCNC: 105 MMOL/L (ref 100–108)
CO2 SERPL-SCNC: 26 MMOL/L (ref 21–32)
CREAT SERPL-MCNC: 0.93 MG/DL (ref 0.6–1.3)
EOSINOPHIL # BLD AUTO: 0 THOUSAND/ΜL (ref 0–0.61)
EOSINOPHIL NFR BLD AUTO: 0 % (ref 0–6)
ERYTHROCYTE [DISTWIDTH] IN BLOOD BY AUTOMATED COUNT: 12.8 % (ref 11.6–15.1)
GFR SERPL CREATININE-BSD FRML MDRD: 57 ML/MIN/1.73SQ M
GLUCOSE SERPL-MCNC: 101 MG/DL (ref 65–140)
GLUCOSE SERPL-MCNC: 81 MG/DL (ref 65–140)
HCT VFR BLD AUTO: 35.4 % (ref 34.8–46.1)
HGB BLD-MCNC: 11.6 G/DL (ref 11.5–15.4)
IMM GRANULOCYTES # BLD AUTO: 0.02 THOUSAND/UL (ref 0–0.2)
IMM GRANULOCYTES NFR BLD AUTO: 0 % (ref 0–2)
LYMPHOCYTES # BLD AUTO: 0.87 THOUSANDS/ΜL (ref 0.6–4.47)
LYMPHOCYTES NFR BLD AUTO: 11 % (ref 14–44)
MAGNESIUM SERPL-MCNC: 1.5 MG/DL (ref 1.6–2.6)
MCH RBC QN AUTO: 30.5 PG (ref 26.8–34.3)
MCHC RBC AUTO-ENTMCNC: 32.8 G/DL (ref 31.4–37.4)
MCV RBC AUTO: 93 FL (ref 82–98)
MONOCYTES # BLD AUTO: 0.66 THOUSAND/ΜL (ref 0.17–1.22)
MONOCYTES NFR BLD AUTO: 8 % (ref 4–12)
NEUTROPHILS # BLD AUTO: 6.59 THOUSANDS/ΜL (ref 1.85–7.62)
NEUTS SEG NFR BLD AUTO: 81 % (ref 43–75)
NRBC BLD AUTO-RTO: 0 /100 WBCS
PHOSPHATE SERPL-MCNC: 2.9 MG/DL (ref 2.3–4.1)
PLATELET # BLD AUTO: 181 THOUSANDS/UL (ref 149–390)
PMV BLD AUTO: 11.3 FL (ref 8.9–12.7)
POTASSIUM SERPL-SCNC: 3.5 MMOL/L (ref 3.5–5.3)
QRS AXIS: 6 DEGREES
QRSD INTERVAL: 88 MS
QT INTERVAL: 420 MS
QTC INTERVAL: 446 MS
RBC # BLD AUTO: 3.8 MILLION/UL (ref 3.81–5.12)
SODIUM SERPL-SCNC: 138 MMOL/L (ref 136–145)
T WAVE AXIS: -27 DEGREES
VENTRICULAR RATE: 68 BPM
WBC # BLD AUTO: 8.15 THOUSAND/UL (ref 4.31–10.16)

## 2022-01-14 PROCEDURE — 83735 ASSAY OF MAGNESIUM: CPT | Performed by: STUDENT IN AN ORGANIZED HEALTH CARE EDUCATION/TRAINING PROGRAM

## 2022-01-14 PROCEDURE — 93010 ELECTROCARDIOGRAM REPORT: CPT | Performed by: INTERNAL MEDICINE

## 2022-01-14 PROCEDURE — 85025 COMPLETE CBC W/AUTO DIFF WBC: CPT | Performed by: STUDENT IN AN ORGANIZED HEALTH CARE EDUCATION/TRAINING PROGRAM

## 2022-01-14 PROCEDURE — 84100 ASSAY OF PHOSPHORUS: CPT | Performed by: STUDENT IN AN ORGANIZED HEALTH CARE EDUCATION/TRAINING PROGRAM

## 2022-01-14 PROCEDURE — 80048 BASIC METABOLIC PNL TOTAL CA: CPT | Performed by: STUDENT IN AN ORGANIZED HEALTH CARE EDUCATION/TRAINING PROGRAM

## 2022-01-14 PROCEDURE — 97166 OT EVAL MOD COMPLEX 45 MIN: CPT

## 2022-01-14 PROCEDURE — 93005 ELECTROCARDIOGRAM TRACING: CPT

## 2022-01-14 PROCEDURE — 97163 PT EVAL HIGH COMPLEX 45 MIN: CPT

## 2022-01-14 PROCEDURE — 99232 SBSQ HOSP IP/OBS MODERATE 35: CPT | Performed by: INTERNAL MEDICINE

## 2022-01-14 RX ORDER — DEXTROSE, SODIUM CHLORIDE, AND POTASSIUM CHLORIDE 5; .45; .15 G/100ML; G/100ML; G/100ML
75 INJECTION INTRAVENOUS CONTINUOUS
Status: DISCONTINUED | OUTPATIENT
Start: 2022-01-14 | End: 2022-01-16

## 2022-01-14 RX ORDER — MAGNESIUM SULFATE HEPTAHYDRATE 40 MG/ML
2 INJECTION, SOLUTION INTRAVENOUS ONCE
Status: COMPLETED | OUTPATIENT
Start: 2022-01-14 | End: 2022-01-14

## 2022-01-14 RX ORDER — POTASSIUM CHLORIDE 20 MEQ/1
20 TABLET, EXTENDED RELEASE ORAL ONCE
Status: COMPLETED | OUTPATIENT
Start: 2022-01-14 | End: 2022-01-14

## 2022-01-14 RX ADMIN — DEXTROSE, SODIUM CHLORIDE, AND POTASSIUM CHLORIDE 75 ML/HR: 5; .45; .15 INJECTION INTRAVENOUS at 09:06

## 2022-01-14 RX ADMIN — DEXTROSE, SODIUM CHLORIDE, AND POTASSIUM CHLORIDE 75 ML/HR: 5; .45; .15 INJECTION INTRAVENOUS at 22:28

## 2022-01-14 RX ADMIN — HEPARIN SODIUM 5000 UNITS: 5000 INJECTION INTRAVENOUS; SUBCUTANEOUS at 13:24

## 2022-01-14 RX ADMIN — MAGNESIUM SULFATE HEPTAHYDRATE 2 G: 40 INJECTION, SOLUTION INTRAVENOUS at 09:11

## 2022-01-14 RX ADMIN — POTASSIUM CHLORIDE 20 MEQ: 1500 TABLET, EXTENDED RELEASE ORAL at 09:14

## 2022-01-14 RX ADMIN — HEPARIN SODIUM 5000 UNITS: 5000 INJECTION INTRAVENOUS; SUBCUTANEOUS at 22:27

## 2022-01-14 RX ADMIN — SODIUM CHLORIDE, SODIUM LACTATE, POTASSIUM CHLORIDE, AND CALCIUM CHLORIDE 125 ML/HR: .6; .31; .03; .02 INJECTION, SOLUTION INTRAVENOUS at 04:57

## 2022-01-14 RX ADMIN — ACETAMINOPHEN 975 MG: 325 TABLET, FILM COATED ORAL at 22:27

## 2022-01-14 RX ADMIN — POTASSIUM PHOSPHATE, MONOBASIC AND POTASSIUM PHOSPHATE, DIBASIC 12 MMOL: 224; 236 INJECTION, SOLUTION, CONCENTRATE INTRAVENOUS at 11:16

## 2022-01-14 RX ADMIN — HEPARIN SODIUM 5000 UNITS: 5000 INJECTION INTRAVENOUS; SUBCUTANEOUS at 05:15

## 2022-01-14 RX ADMIN — NEBIVOLOL HYDROCHLORIDE 5 MG: 5 TABLET ORAL at 09:15

## 2022-01-14 RX ADMIN — VERAPAMIL HYDROCHLORIDE 180 MG: 180 TABLET ORAL at 22:27

## 2022-01-14 RX ADMIN — ACETAMINOPHEN 975 MG: 325 TABLET, FILM COATED ORAL at 05:16

## 2022-01-14 NOTE — PLAN OF CARE
Problem: PHYSICAL THERAPY ADULT  Goal: Performs mobility at highest level of function for planned discharge setting  See evaluation for individualized goals  Description: Treatment/Interventions: ADL retraining,Functional transfer training,LE strengthening/ROM,Elevations,Therapeutic exercise,Endurance training,Patient/family training,Equipment eval/education,Bed mobility,Gait training,Compensatory technique education,Spoke to nursing,Spoke to case management          See flowsheet documentation for full assessment, interventions and recommendations  Note: Prognosis: Excellent  Problem List: Decreased range of motion,Decreased endurance,Impaired balance,Decreased mobility,Decreased skin integrity,Pain  Assessment: Pt is 80 y o  female seen for PT evaluation s/p admit to [facility] on 1/13/2022 Pt dx w/ Tubulovillous adenoma  Pt is  s/p laparoscopic right colectomy on 1/13  Comorbidities affecting pt's physical performance at time of assessment listed above and significant for:  has a past medical history of A-fib ([address] ), Anemia, Arthritis, Breast pain, right, Chest pain on breathing, Colon polyp, Cough, Diverticulosis, Encounter for screening colonoscopy, H/O sick sinus syndrome, Heart murmur, High cholesterol, History of atrial fibrillation, History of weight loss, long term use of blood thinners, Hypertension, Irregular heart beat, Pacemaker, Preop examination, Shortness of breath, and Viral bronchitis    Personal factors affecting pt at time of IE include: steps to enter environment, limited home support/ lives alone;  advanced age, past experience, inability to perform IADLs    Due to acute medical issues, post op management; pain / PCA ongoing medical workup for primary dx; pain, increased reliance on more restrictive AD compared to baseline;  decreased activity tolerance compared to baseline, continuous telemetry monitoring, multiple lines, decline in overall functional mobility status; health management issues; note unstable clinical picture (high complexity)   Prior to admission, pt was living alone in an 8th fllor apt w/ elevator  Pt was fully indep PTA; 0 falls  I w/ ADL's and IADL's   Currently pt  is requiring Tan A for bed skills; SBA for functional transfers and SBA>  for ambulation holding onto IV pole (declining use of RW despite education)   Pt presents functioning below baseline and currently w/ overall mobility deficits 2* to: post op pain; decreased endurance; decreased activity tolerance;  impaired balance; gait deviations; SOB/CUEVAS; fatigue;  multiple lines  (Please find additional objective findings from PT assessment regarding body systems outlined above ) Pt will continue to benefit from skilled PT interventions to address stated impairments; to maximize functional potential; for ongoing pt/ family training; and DME needs  Anticipate that with continued progress pt to d/c home with family support and no needs (possible RW if pt agreeable) when medically cleared  PT Discharge Recommendation: No rehabilitation needs (w/ progress )          See flowsheet documentation for full assessment

## 2022-01-14 NOTE — PROGRESS NOTES
Colorectal Surgery  Progress Note   Norval Blunt 80 y o  female MRN: [id number]  Unit/Bed#: [facility] 828-01 Encounter: [id number]    Assessment:  80year old male with a history of right tubulovillous adenoma colon polyp POD 1 s/p laparoscopic right colectomy on   Vital signs stable, afebrile  Afib overnight with PVCs  Patient asymptomatic, no chest, palpitations  97% on room air  No flatus or BM    Plan:   Advance diet to clears   D/c contreras   Switch to maintence IVF   PCA-d for pain   DVT ppx: SQH, SCDs   Pain/ nausea control PRN   OOB/ ambulation   Incentive Spirometry    Subjective/Objective     Subjective: Patient seen and examined at bedside, in no acute distress  She denies pain  Nausea has improved  Denies flatus or BM  Objective:     Vitals:Blood pressure 140/58, pulse 61, temperature (!) 97 3 °F (36 3 °C), resp  rate 16, height 5' 3" (1 6 m), weight 59 kg (130 lb), SpO2 97 %  ,Body mass index is 23 03 kg/m²  Temp (24hrs), Av 8 °F (36 6 °C), Min:97 3 °F (36 3 °C), Max:99 3 °F (37 4 °C)  Current: Temperature: (!) 97 3 °F (36 3 °C)      Intake/Output Summary (Last 24 hours) at 2022 193  Last data filed at 2022 1430  Gross per 24 hour   Intake 2200 ml   Output 95 ml   Net 2105 ml       Invasive Devices  Report    Peripheral Intravenous Line            Peripheral IV 22 Left Hand <1 day    Peripheral IV 22 Right Hand <1 day          Drain            Urethral Catheter Latex 16 Fr  <1 day              Physical Exam:  General: No acute distress, alert and oriented  CV: Well perfused, regular rate and rhythm  Lungs: Normal work of breathing, no increased respiratory effort  Abdomen: Soft, non-tender, non-distended  Incisions clean, dry and intact    Extremities: No edema, clubbing or cyanosis  Skin: Warm, dry    Lab Results:   CBC:   Lab Results   Component Value Date    WBC 8 15 2022    HGB 11 6 2022    HCT 35 4 2022    MCV 93 2022     01/14/2022    MCH 30 5 01/14/2022    MCHC 32 8 01/14/2022    RDW 12 8 01/14/2022    MPV 11 3 01/14/2022    NRBC 0 01/14/2022     VTE Prophylaxis: Sequential compression device (Venodyne)  and Heparin    Sabina Gregg MD  1/13/2022

## 2022-01-14 NOTE — PHYSICAL THERAPY NOTE
PHYSICAL THERAPY EVALUATION  NAME:  Roya Rodrigo: 01/14/22    AGE:   80 y o  Mrn:   [id number]  ADMIT DX:  Tubulovillous adenoma [D36 9]    Past Medical History:   Diagnosis Date    A-fib ([address] )     Anemia     Arthritis     Breast pain, right     Chest pain on breathing     Colon polyp     Cough     Diverticulosis     Encounter for screening colonoscopy     H/O sick sinus syndrome     Heart murmur     High cholesterol     History of atrial fibrillation     Rate ontrolled  On xarelto 15mg (creatinine 50) Followed by Dr Casey Alvarado with Cardiology     History of weight loss     Report loose fitting clothes  Weight stable (3lbs difference)  Last colo showed small tubular adenoma x2  Breast biopsy last showed fibrocystic changes  TSH wnl  Will check cbc, bmp, spep   Hx of long term use of blood thinners     Hypertension     Irregular heart beat     Pacemaker     Preop examination     Shortness of breath     Viral bronchitis      Past Surgical History:   Procedure Laterality Date    BREAST BIOPSY Right 08/17/2006    ultrasound guided Percutaneous Needle Core -Benign    CATARACT EXTRACTION      CATARACT EXTRACTION W/ INTRAOCULAR LENS  IMPLANT, BILATERAL      COLONOSCOPY      COLONOSCOPY N/A 5/22/2018    Procedure: COLONOSCOPY;  Surgeon: Serenity Cabrera DO;  [facility]; Service: Gastroenterology    Jimmie Valdez / Krista Sanders / Frankey Blanch Right     as a child after a fall    MAMMO STEREOTACTIC BREAST BIOPSY RIGHT (ALL INC) Right 10/2014    benign    RI CMBND ANTERPOST COLPORRAPHY W/CYSTO N/A 3/17/2016    Procedure: COLPORRHAPHY ANTERIOR POSTERIOR ;  Surgeon: Claudetta Allen, MD;  [facility];  Service: Gynecology    RI COLONOSCOPY FLX DX W/[facility] INPATIENT REHABILITATION WHEN PFRMD N/A 4/4/2019    Procedure: COLONOSCOPY;  Surgeon: Serenity Cabrera DO;  [facility];   Service: Gastroenterology    RI CYSTOURETHROSCOPY N/A 3/17/2016    Procedure: CYSTOSCOPY;  Surgeon: Estiven Mora MD;  [facility];  Service: Gynecology    MI ESOPHAGOGASTRODUODENOSCOPY TRANSORAL DIAGNOSTIC N/A 4/16/2018    Procedure: EGD AND COLONOSCOPY;  Surgeon: David Trinidad DO;  [facility];   Service: Gastroenterology    MI LAP,SURG,COLECTOMY, PARTIAL, W/ANAST Right 1/13/2022    Procedure: Diagnostic laparoscopy, laparscopic right colectomy;  Surgeon: Sylvia Carlisle MD;  [facility];  Service: Colorectal    MI REVAGINAL PROLAPSE,SACROSP LIG N/A 3/17/2016    Procedure: COLPOPEXY VAGINAL EXTRAPERITONEAL (VEC) ANTERIOR ;  Surgeon: Estiven Mora MD;  [facility];  Service: Gynecology    MI SLING OPER STRES INCONTINENCE N/A 3/17/2016    Procedure: INSERTION PUBOVAGINAL SLING SINGLE INCISION ;  Surgeon: Estiven Mora MD;  [facility];  Service: Gynecology       Length Of Stay: 1  PHYSICAL THERAPY EVALUATION :    01/14/22 1100   Pain Assessment   Pain Assessment Tool 0-10   Pain Score 1   Pain Location/Orientation Location: Abdomen   Restrictions/Precautions   Weight Bearing Precautions Per Order No   Braces or Orthoses   (none )   Other Precautions Multiple lines;Telemetry;Pain  (PCA)   Home Living   Type of [address] level;Elevator   Additional Comments lives alone - 8th fl w/ elevator    Prior Function   Level of [place] Independent with ADLs and functional mobility   Lives With Alone   Receives Help From Family   Vocational Retired   Comments indep w/o AD; I w/ ADL"s    Cognition   Overall Cognitive Status WFL   Orientation Level Oriented X4   Comments pleasant and cooperative- motivated    Subjective   Subjective wants to walk    RUE Assessment   RUE Assessment WFL   LUE Assessment   LUE Assessment WFL   RLE Assessment   RLE Assessment WFL   LLE Assessment   LLE Assessment WFL   Light Touch   RLE Light Touch Grossly intact   LLE Light Touch Grossly intact   Sharp/Dull   RLE Sharp/Dull Grossly intact   LLE Sharp/Dull Grossly intact   Proprioception   RLE Proprioception Grossly intact   LLE Proprioception Grossly Intact   Transfers   Sit to Stand 5  Supervision  (CGA)   Stand to Sit 5  Supervision   Ambulation/Elevation   Gait pattern Excessively slow; Step to;Decreased foot clearance   Gait Assistance 4  Minimal assist  (CGA> S)   Assistive Device   (holdign IV pole- refusing RW attempt despite education )   Distance 80+20   Ambulation/Elevation Additional Comments simulted stairs via high march and minisquat x5 w/ close S and UE suporrt on rail    Balance   Static Sitting Good   Dynamic Sitting Fair +   Static Standing Fair +   Dynamic Standing Fair   Ambulatory Poor +   Endurance Deficit   Endurance Deficit Yes   Endurance Deficit Description pain and fatigue limiting    Activity Tolerance   Activity Tolerance Patient limited by pain   Medical Staff Made Aware yes   Nurse Made Aware yes   Assessment   Prognosis Excellent   Problem List Decreased range of motion;Decreased endurance; Impaired balance;Decreased mobility; Decreased skin integrity;Pain   Assessment Pt is 80 y o  female seen for PT evaluation s/p admit to [facility] on 1/13/2022 Pt dx w/ Tubulovillous adenoma  Pt is  s/p laparoscopic right colectomy on 1/13  Comorbidities affecting pt's physical performance at time of assessment listed above and significant for:  has a past medical history of A-fib ([address] ), Anemia, Arthritis, Breast pain, right, Chest pain on breathing, Colon polyp, Cough, Diverticulosis, Encounter for screening colonoscopy, H/O sick sinus syndrome, Heart murmur, High cholesterol, History of atrial fibrillation, History of weight loss, long term use of blood thinners, Hypertension, Irregular heart beat, Pacemaker, Preop examination, Shortness of breath, and Viral bronchitis    Personal factors affecting pt at time of IE include: steps to enter environment, limited home support/ lives alone;  advanced age, past experience, inability to perform IADLs   Due to acute medical issues, post op management; pain / PCA ongoing medical workup for primary dx; pain, increased reliance on more restrictive AD compared to baseline;  decreased activity tolerance compared to baseline, continuous telemetry monitoring, multiple lines, decline in overall functional mobility status; health management issues; note unstable clinical picture (high complexity)   Prior to admission, pt was living alone in an 8th fllor apt w/ elevator  Pt was fully indep PTA; 0 falls  I w/ ADL's and IADL's   Currently pt  is requiring Tan A for bed skills; SBA for functional transfers and SBA>  for ambulation holding onto IV pole (declining use of RW despite education)   Pt presents functioning below baseline and currently w/ overall mobility deficits 2* to: post op pain; decreased endurance; decreased activity tolerance;  impaired balance; gait deviations; SOB/CUEVAS; fatigue;  multiple lines  (Please find additional objective findings from PT assessment regarding body systems outlined above ) Pt will continue to benefit from skilled PT interventions to address stated impairments; to maximize functional potential; for ongoing pt/ family training; and DME needs  Anticipate that with continued progress pt to d/c home with family support and no needs (possible RW if pt agreeable) when medically cleared  Goals   Patient Goals go home    STG Expiration Date 01/24/22   Short Term Goal #1 In 10 days pt will complete: 1) Bed mobility skills with indep to facilitate safe return to previous living environment and decrease burden on caregivers  2) Functional transfers with indep  to facilitate safe return to previous living environment  3) Ambulation with least restrictive AD > 300 indep' without LOB and stable vitals for safe ambulation in home/ community environment  4) Stair training up/ down curb  living environment and to increase community access   5) Improve balance by 1 grade in order to decrease fall risk  6) Improve LE strength grades by 1 to increase independence w/ all functional mobility, transfers and gait  7) PT for ongoing pt and family education; DME needs and D/C planning to promote highest level of function in least restrictive environment  Plan   Treatment/Interventions ADL retraining;Functional transfer training;LE strengthening/ROM; Elevations; Therapeutic exercise; Endurance training;Patient/family training;Equipment eval/education; Bed mobility;Gait training; Compensatory technique education;Spoke to nursing;Spoke to case management   PT Frequency 3-5x/wk   Recommendation   PT Discharge Recommendation No rehabilitation needs  (w/ progress )   AM-PAC Basic Mobility Inpatient   Turning in Bed Without Bedrails 4   Lying on Back to Sitting on Edge of Flat Bed 3   Moving Bed to Chair 3   Standing Up From Chair 3   Walk in Room 3   Climb 3-5 Stairs 3   Basic Mobility Inpatient Raw Score 19   Basic Mobility Standardized Score 42 48   Highest Level Of Mobility   -[facility] Goal 6: Walk 10 steps or more

## 2022-01-14 NOTE — PLAN OF CARE
Problem: MOBILITY - ADULT  Goal: Maintain or return to baseline ADL function  Description: INTERVENTIONS:  -  Assess patient's ability to carry out ADLs; assess patient's baseline for ADL function and identify physical deficits which impact ability to perform ADLs (bathing, care of mouth/teeth, toileting, grooming, dressing, etc )  - Assess/evaluate cause of self-care deficits   - Assess range of motion  - Assess patient's mobility; develop plan if impaired  - Assess patient's need for assistive devices and provide as appropriate  - Encourage maximum independence but intervene and supervise when necessary  - Involve family in performance of ADLs  - Assess for home care needs following discharge   - Consider OT consult to assist with ADL evaluation and planning for discharge  - Provide patient education as appropriate  Outcome: Progressing  Goal: Maintains/Returns to pre admission functional level  Description: INTERVENTIONS:  - Perform BMAT or MOVE assessment daily    - Set and communicate daily mobility goal to care team and patient/family/caregiver  - Collaborate with rehabilitation services on mobility goals if consulted  - Perform Range of Motion 3 times a day  - Reposition patient every 2 hours    - Dangle patient 3 times a day  - Stand patient 3 times a day  - Ambulate patient 3 times a day  - Out of bed to chair 3 times a day   - Out of bed for meals 3 times a day  - Out of bed for toileting  - Record patient progress and toleration of activity level   Outcome: Progressing     Problem: Potential for Falls  Goal: Patient will remain free of falls  Description: INTERVENTIONS:  - Educate patient/family on patient safety including physical limitations  - Instruct patient to call for assistance with activity   - Consult OT/PT to assist with strengthening/mobility   - Keep Call bell within reach  - Keep bed low and locked with side rails adjusted as appropriate  - Keep care items and personal belongings within reach  - Initiate and maintain comfort rounds  - Make Fall Risk Sign visible to staff  - Apply yellow socks and bracelet for high fall risk patients  - Consider moving patient to room near nurses station  Outcome: Progressing

## 2022-01-14 NOTE — QUICK NOTE
Colorectal Surgery  Post Op Check   Glomarinaa Else 80 y o  female MRN: [id number]  Unit/Bed#: [facility] 828-01 Encounter: [id number]    Assessment:  80year old male with a history of right tubulovillous adenoma colon polyp POD 0 s/p laparoscopic right colectomy on   Vital signs stable, afebrile  97% on room air  Patient denies pain, reports some nausea without vomiting  Plan:   NPO   Continue contreras   Continue IVF   PCA-d for pain   DVT ppx: SQH, SCDs   Pain/ nausea control PRN   OOB/ ambulation   Incentive Spirometry    Subjective/Objective     Subjective: Patient seen and examined at bedside, in no acute distress  She denies pain  Reports some nausea, no vomiting  Objective:     Vitals:Blood pressure 140/58, pulse 61, temperature (!) 97 3 °F (36 3 °C), resp  rate 16, height 5' 3" (1 6 m), weight 59 kg (130 lb), SpO2 97 %  ,Body mass index is 23 03 kg/m²  Temp (24hrs), Av 8 °F (36 6 °C), Min:97 3 °F (36 3 °C), Max:99 3 °F (37 4 °C)  Current: Temperature: (!) 97 3 °F (36 3 °C)      Intake/Output Summary (Last 24 hours) at 2022 1902  Last data filed at 2022 1430  Gross per 24 hour   Intake 2200 ml   Output 95 ml   Net 2105 ml       Invasive Devices  Report    Peripheral Intravenous Line            Peripheral IV 22 Left Hand <1 day    Peripheral IV 22 Right Hand <1 day          Drain            Urethral Catheter Latex 16 Fr  <1 day                Physical Exam:  General: No acute distress, alert and oriented  CV: Well perfused, regular rate and rhythm  Lungs: Normal work of breathing, no increased respiratory effort  Abdomen: Soft, non-tender, non-distended  Incisions clean, dry and intact    Extremities: No edema, clubbing or cyanosis  Skin: Warm, dry    VTE Prophylaxis: Sequential compression device (Venodyne)  and Heparin    Philly Rush MD  2022

## 2022-01-14 NOTE — CASE MANAGEMENT
Case Management Assessment & Discharge Planning Note    Patient name Jm Morales  Location [address] 828/PPHP 299-23 MRN [id number]  : 1940 Date 2022       Current Admission Date: 2022  Current Admission Diagnosis:Tubulovillous adenoma   Patient Active Problem List    Diagnosis Date Noted    Abnormal nuclear stress test 2021    Diverticulosis of colon 2019    Presence of cardiac pacemaker 2018    Tubulovillous adenoma 2018    Gastroesophageal reflux disease without esophagitis 2018    Essential hypertension 2017    Hyperlipidemia LDL goal <100 2017    Stage 3 chronic kidney disease ([facility] [address] ) 2017    Osteoarthritis of both wrists 2017    Chronic atrial fibrillation ([facility] [address] ) 2017    Cavus deformity of foot 2017    Hallux abducto valgus, bilateral 2017    Lipoma of right upper extremity 2017    Pain due to onychomycosis of toenails of both feet 2017    Allergic rhinitis due to pollen 10/12/2015    Midline cystocele 2014    Osteoarthritis of hip 2014    B12 deficiency 2013    Osteopenia 11/15/2013    Left renal artery stenosis ([facility] [address] ) 2013    Left atrial enlargement 2013      LOS (days): 1  Geometric Mean LOS (GMLOS) (days): 3 30  Days to GMLOS:2 4     OBJECTIVE:    Risk of Unplanned Readmission Score: 9         Current admission status: Inpatient       Preferred Pharmacy:   [address]  Phone: [phone] Fax: [phone]    Primary Care Provider: Zac Morley MD    Primary Insurance: Wilburn Farber MEDICARE [facility] REP  Secondary Insurance: Bennie Ballard    ASSESSMENT:  Active Health Care Agents    There are no active Health Care Agents on file                        Patient Information  Admitted from[de-identified] Home  Mental Status: Alert  During Assessment patient was accompanied by: Not accompanied during assessment  Assessment information provided by[de-identified] Patient  Primary Caregiver: Self  Support Systems: Son  Home entry access options   Select all that apply : Elevator  Type of Current Residence: Apartment  Floor Level: 8  Living Arrangements: Lives Alone    Activities of Daily Living Prior to Admission  Functional Status: Independent  Completes ADLs independently?: Yes  Ambulates independently?: Yes  Does patient use assisted devices?: No  Does patient currently own DME?: No  Does patient have a history of Outpatient Therapy (PT/OT)?: No  Does the patient have a history of Short-Term Rehab?: No  Does patient have a history of HHC?: No  Does patient currently have [facility]?: No         Patient Information Continued  Income Source: SSI/SSD  Does patient have prescription coverage?: Yes  Does patient receive dialysis treatments?: No  Does patient have a history of substance abuse?: No  Does patient have a history of Mental Health Diagnosis?: No         Means of Transportation  Means of Transport to Appts[de-identified] Family transport        DISCHARGE DETAILS:                           Contacts  Patient Contacts: son mariposa Real Nidia  Relationship to Patient[de-identified] Family  Contact Method: Phone  Phone Number: [phone]  Reason/Outcome: Emergency Contact

## 2022-01-14 NOTE — RESTORATIVE TECHNICIAN NOTE
Restorative Technician Note      Patient Name: Tita Sabillon     Restorative Tech Visit Date: 01/14/22  Note Type: Mobility  Patient Position Upon Consult: Supine  Activity Performed: Ambulated  Assistive Device: Other (Comment) (IV pole for stability)  Patient Position at End of Consult: Bedside chair;  All needs within reach; Bed/Chair alarm activated    Starla Urbina  DPT, Restorative Technician

## 2022-01-14 NOTE — UTILIZATION REVIEW
Initial Clinical Review    Elective IP surgical procedure  Age/Sex: 80 y o  female  Surgery Date: 1/13/2022  Procedure: Diagnostic laparoscopy, laparscopic right colectomy (Right)  Anesthesia: General  Operative Findings:   -mid ascending colon tattoo located, mobile cecum appendix in the right upper quadrant  -ileum to midtransverse colon ileocolic side-to-side functional end-to-end stapled anastomosis    POD#1 Progress Note:  Afib overnight with PVCs  Patient asymptomatic, no chest, palpitations  97% on room air  Pt denies pain or nausea this AM  No flatus, no BM  Advance diet to clears  D/c contreras  Switch to maintenance IVFs  Continue PCA-d for pain  SCD/SQH  Continue pain/nausea control  OOB/ambulate  Incentive spirometry      Admission Orders: Date/Time/Statement:   Admission Orders (From admission, onward)     Ordered        01/13/22 1330  Inpatient Admission  Once                      Orders Placed This Encounter   Procedures    Inpatient Admission     Standing Status:   Standing     Number of Occurrences:   1     Order Specific Question:   Level of Care     Answer:   Med Surg [16]     Order Specific Question:   Estimated length of stay     Answer:   Inpatient Only Surgery     Vital Signs:   Date/Time Temp Pulse Resp BP MAP (mmHg) SpO2 Calculated FIO2 (%) - Nasal Cannula O2 Flow Rate (L/min) Nasal Cannula O2 Flow Rate (L/min) O2 Device   01/14/22 07:41:10 97 2 °F (36 2 °C) Abnormal  63 18 119/55 76 97 % -- -- -- --   01/14/22 0245 -- -- -- -- -- -- -- -- -- None (Room air)   01/14/22 02:43:36 97 3 °F (36 3 °C) Abnormal  69 17 120/55 77 97 % -- -- -- --   01/14/22 02:42:40 -- 68 -- 120/55 77 97 % -- -- -- --   01/13/22 2300 -- -- -- -- -- -- -- -- -- None (Room air)   01/13/22 22:55:50 97 2 °F (36 2 °C) Abnormal  69 17 120/53 75 97 % -- -- -- --   01/13/22 21:28:53 -- -- -- 102/54 70 -- -- -- -- --   01/13/22 1900 -- -- -- -- -- -- -- -- -- None (Room air)   01/13/22 18:54:24 97 3 °F (36 3 °C) Abnormal  61 16 140/58 85 97 % -- -- -- --   01/13/22 17:58:40 97 3 °F (36 3 °C) Abnormal  79 20 138/58 85 95 % -- -- -- --   01/13/22 16:50:34 97 3 °F (36 3 °C) Abnormal  65 16 141/60 87 94 % -- -- -- --   01/13/22 15:48:37 98 2 °F (36 8 °C) 73 16 145/61 89 97 % -- -- -- --   01/13/22 1541 -- -- -- -- -- -- -- -- -- None (Room air)   01/13/22 1515 -- 62 20 128/56 85 100 % 32 -- 3 L/min Nasal cannula   01/13/22 1500 -- 64 20 140/58 92 100 % 32 -- 3 L/min Nasal cannula   01/13/22 1445 98 °F (36 7 °C) 68 20 125/59 88 99 % 32 -- 3 L/min Nasal cannula   01/13/22 1430 -- 62 20 136/58 87 99 % 32 -- 3 L/min Nasal cannula   01/13/22 1415 -- 62 20 140/54 77 100 % 32 -- 3 L/min Nasal cannula   01/13/22 1400 -- 64 20 126/58 88 100 % 32 -- 3 L/min Nasal cannula   01/13/22 1345 -- 60 20 137/57 94 100 % 32 -- 3 L/min Nasal cannula   01/13/22 1330 -- 64 20 154/57 92 99 % 32 -- 3 L/min Nasal cannula   01/13/22 1325 99 3 °F (37 4 °C) 68 20 139/66 -- 100 % -- 6 L/min -- Simple mask   01/13/22 0824 97 4 °F (36 3 °C) Abnormal  78 20 121/61 -- 97 % -- -- -- None (Room air)     Pertinent Labs/Diagnostic Test Results:       Results from last 7 days   Lab Units 01/14/22  0528 01/13/22  1625   WBC Thousand/uL 8 15  --    HEMOGLOBIN g/dL 11 6  --    HEMATOCRIT % 35 4  --    PLATELETS Thousands/uL 181 190   NEUTROS ABS Thousands/µL 6 59  --        Results from last 7 days   Lab Units 01/14/22  0528   SODIUM mmol/L 138   POTASSIUM mmol/L 3 5   CHLORIDE mmol/L 105   CO2 mmol/L 26   ANION GAP mmol/L 7   BUN mg/dL 14   CREATININE mg/dL 0 93   EGFR ml/min/1 73sq m 57   CALCIUM mg/dL 8 5   MAGNESIUM mg/dL 1 5*   PHOSPHORUS mg/dL 2 9     Results from last 7 days   Lab Units 01/13/22  0851   POC GLUCOSE mg/dl 208*     Results from last 7 days   Lab Units 01/14/22  0528   GLUCOSE RANDOM mg/dL 101         Scheduled Medications:  acetaminophen, 975 mg, Oral, Q8H ADAN  heparin (porcine), 5,000 Units, Subcutaneous, Q8H ADAN  magnesium sulfate, 2 g, Intravenous, Once  nebivolol, 5 mg, Oral, Daily  potassium chloride, 20 mEq, Oral, Once  potassium phosphate, 12 mmol, Intravenous, Once  verapamil, 180 mg, Oral, HS    Continuous IV Infusions:  dextrose 5 % and sodium chloride 0 45 % with KCl 20 mEq/L, 75 mL/hr, Intravenous, Continuous  HYDROmorphone, , Intravenous, Continuous    PRN Meds:  HYDROmorphone, 0 2 mg, Intravenous, Q3H PRN  lactated ringers, 1,000 mL, Intravenous, Once PRN  naloxone, 0 04 mg, Intravenous, Q1MIN PRN  ondansetron, 4 mg, Intravenous, Q6H PRN 1/13 x1  sodium chloride, 1,000 mL, Intravenous, Once PRN          Network Utilization Review Department  ATTENTION: Please call with any questions or concerns to [phone] and carefully listen to the prompts so that you are directed to the right person  All voicemails are confidential   Gandara Krys all requests for admission clinical reviews, approved or denied determinations and any other requests to dedicated fax number below belonging to the campus where the patient is receiving treatment   List of dedicated fax numbers for the Facilities:  [address] [place] [address] DENIALS (Administrative/Medical Necessity) [phone]   [address] (Maternity/NICU/Pediatrics) [phone]   [address]  [address] [facility] [place] [address] [address] [place] [address] [address] [facility] [address] [facility] [phone] no

## 2022-01-14 NOTE — OCCUPATIONAL THERAPY NOTE
Occupational Therapy Evaluation     Patient Name: Homa Whitt  HSQGQ'D Date: 1/14/2022  Problem List  Principal Problem:    Tubulovillous adenoma  Active Problems:    Renal insufficiency    Past Medical History  Past Medical History:   Diagnosis Date    A-fib ([address] )     Anemia     Arthritis     Breast pain, right     Chest pain on breathing     Colon polyp     Cough     Diverticulosis     Encounter for screening colonoscopy     H/O sick sinus syndrome     Heart murmur     High cholesterol     History of atrial fibrillation     Rate ontrolled  On xarelto 15mg (creatinine 50) Followed by Dr Severino Motta with Cardiology     History of weight loss     Report loose fitting clothes  Weight stable (3lbs difference)  Last colo showed small tubular adenoma x2  Breast biopsy last showed fibrocystic changes  TSH wnl  Will check cbc, bmp, spep   Hx of long term use of blood thinners     Hypertension     Irregular heart beat     Pacemaker     Preop examination     Shortness of breath     Viral bronchitis      Past Surgical History  Past Surgical History:   Procedure Laterality Date    BREAST BIOPSY Right 08/17/2006    ultrasound guided Percutaneous Needle Core -Benign    CATARACT EXTRACTION      CATARACT EXTRACTION W/ INTRAOCULAR LENS  IMPLANT, BILATERAL      COLONOSCOPY      COLONOSCOPY N/A 5/22/2018    Procedure: COLONOSCOPY;  Surgeon: David Trinidad DO;  [facility];   Service: Gastroenterology    [place] Zhou / Forrets Pagan / Connie Mehta Right     as a child after a fall    MAMMO STEREOTACTIC BREAST BIOPSY RIGHT (ALL INC) Right 10/2014    benign    MS CMBND ANTERPOST COLPORRAPHY W/CYSTO N/A 3/17/2016    Procedure: COLPORRHAPHY ANTERIOR POSTERIOR ;  Surgeon: Estiven Mora MD;  [facility];  Service: Gynecology    MS COLONOSCOPY FLX DX W/ASTRID Quintero 1978 PFRMD N/A 4/4/2019    Procedure: COLONOSCOPY;  Surgeon: David Trinidad DO;  [facility];  Service: Gastroenterology    AR CYSTOURETHROSCOPY N/A 3/17/2016    Procedure: CYSTOSCOPY;  Surgeon: Gayatri Schaeffer MD;  [facility];  Service: Gynecology    AR ESOPHAGOGASTRODUODENOSCOPY TRANSORAL DIAGNOSTIC N/A 4/16/2018    Procedure: EGD AND COLONOSCOPY;  Surgeon: Orlin Monroe DO;  [facility]; Service: Gastroenterology    AR LAP,SURG,COLECTOMY, PARTIAL, W/ANAST Right 1/13/2022    Procedure: Diagnostic laparoscopy, laparscopic right colectomy;  Surgeon: Ami Darby MD;  [facility];  Service: Colorectal    AR REVAGINAL PROLAPSE,SACROSP LIG N/A 3/17/2016    Procedure: COLPOPEXY VAGINAL EXTRAPERITONEAL (VEC) ANTERIOR ;  Surgeon: Gayatri Schaeffer MD;  [facility];  Service: Gynecology    AR SLING OPER STRES INCONTINENCE N/A 3/17/2016    Procedure: INSERTION PUBOVAGINAL SLING SINGLE INCISION ;  Surgeon: Gayatri Schaeffer MD;  [facility];  Service: Gynecology             01/14/22 1124   OT Last Visit   OT Visit Date 01/14/22   Note Type   Note type Evaluation   Restrictions/Precautions   Weight Bearing Precautions Per Order No   Other Precautions Telemetry;Multiple lines; Fall Risk;Pain  (PCA)   Pain Assessment   Pain Assessment Tool 0-10   Pain Score No Pain   Home Living   Type of Home Apartment   Home Layout One level;Performs ADLs on one level; Able to live on main level with bedroom/bathroom; Elevator   Bathroom Shower/Tub Tub/shower unit   Bathroom Toilet Standard   Bathroom Equipment Grab bars in [facility]   (denies)   Additional Comments Pt lives alone in 8th floor apartment with elevator to enter  Prior Function   Level of [place] Independent with ADLs and functional mobility   Lives With Alone   Receives Help From Family  (Son)   ADL Assistance Independent   IADLs Needs assistance   Falls in the last 6 months 0   Vocational Retired   Comments PTA, Pt I with ADLs and functional mobility with no AD   Pt requires assistance with IADLs from son, however, Pt able to complete light ADLs suchs as laundry, cleaning, or cooking  Pt's son provides transportation  Pt with supportive son able to assist upon DC  Lifestyle   Autonomy I with ADLs/light IADLs and functional mobility with no AD   Reciprocal Relationships Son   Service to Others Retired   Intrinsic Gratification Cleaning/reading/TV/keeping busy- would like to get back to [facility]   Psychosocial   Psychosocial (WDL) WDL   ADL   Where Assessed Chair   Eating Assistance 7  Independent   Grooming Assistance [address] [facility]  5  Supervision/Setup   LB Bathing Assistance 5  Supervision/Setup   UB Dressing Assistance 5  Supervision/Setup   LB Dressing Assistance 5  Supervision/Setup   LB Dressing Deficit Thread RLE into underwear; Thread LLE into underwear; Increased time to complete;Supervision/safety;Setup   Toileting Assistance  5  Supervision/Setup   Functional Assistance 5  Supervision/Setup   Additional Comments Pt educated on LB dressing compensatory techniques s/p sx and able to don underwear at S level upon eval    Bed Mobility   Supine to Sit Unable to assess   Sit to Supine Unable to assess   Additional Comments Pt greeted OOB in recliner chair and hand off to PT at end of session  Transfers   Sit to Stand 5  Supervision   Stand to Sit 5  Supervision   Functional Mobility   Functional Mobility 5  Supervision   Additional Comments CGA Use of IV pole- household distances  Balance   Static Sitting Good   Dynamic Sitting Fair +   Static Standing Fair +   Dynamic Standing Fair -   Ambulatory Fair -   Activity Tolerance   Activity Tolerance Patient tolerated treatment well   Medical Staff Made Aware PT-updated on home set up  CM updated  Nurse Made Aware RN cleared/updated     RUE Assessment   RUE Assessment WFL   LUE Assessment   LUE Assessment WFL   Hand Function   Gross Motor Coordination Functional   Fine Motor Coordination Functional Sensation   Light Touch No apparent deficits   Cognition   Overall Cognitive Status WFL   Arousal/Participation Alert; Cooperative   Attention Within functional limits   Orientation Level Oriented X4   Memory Within functional limits   Following Commands Follows all commands and directions without difficulty   Comments Pt pleasant and cooperative during OT session  Pt understanding of ADl education  Assessment   Limitation Decreased ADL status; Decreased UE ROM; Decreased UE strength;Decreased Safe judgement during ADL;Decreased cognition;Decreased endurance;Decreased self-care trans;Decreased high-level ADLs   Prognosis Fair   Assessment Pt is a 81 yo Female who [facility] on [facility]/14[facility]2022 with tubulovillous adenoma  Pt s/p "Diagnostic laparoscopy, laparscopic right colectomy (Right)" on 1/13/2021  Pt  has a past medical history of A-fib ([address] ), Anemia, Arthritis, Breast pain, right, Chest pain on breathing, Colon polyp, Cough, Diverticulosis, Encounter for screening colonoscopy, H/O sick sinus syndrome, Heart murmur, High cholesterol, History of atrial fibrillation, History of weight loss, long term use of blood thinners, Hypertension, Irregular heart beat, Pacemaker, Preop examination, Shortness of breath, and Viral bronchitis  Pt greeted up in recliner chair for OT evaluation on 1/14/2022  Pt lives alone in 8th floor apartment with elevator to enter  PTA, Pt I with ADLs and functional mobility with no AD  Pt requires assistance with IADLs from son, however, Pt able to complete light ADLs suchs as laundry, cleaning, or cooking  Pt's son provides transportation  Pt with supportive son able to assist upon DC  Pt demonstrating the following occupational deficits: S with UB/LB ADLs, S with functional transfers and CGA with functional mobility with IV pole  Pt understanding of LB compensatory techniques with ADLs and Pt with no further acute OT concerns   Pt would benefit from returning home with increased support from family upon DC to maximize safety and independence with ADLs and functional tasks of choice  Pt encouraged to continue participating in ADLs with nursing staff during course of hospitalization  DC skilled OT services  Goals   Patient Goals To go home  Plan   OT Frequency Eval only   Recommendation   OT Discharge Recommendation No rehabilitation needs   OT - OK to Discharge Yes   Additional Comments  The patient's raw score on the AM-PAC Daily Activity inpatient short form is 21, standardized score is 44 27, greater than 39 4  Patients at this level are likely to benefit from discharge to home  Please refer to the recommendation of the Occupational Therapist for safe discharge planning     AM-PAC Daily Activity Inpatient   Lower Body Dressing 3   Bathing 3   Toileting 3   Upper Body Dressing 4   Grooming 4   Eating 4   Daily Activity Raw Score 21   Daily Activity Standardized Score (Calc for Raw Score >=11) 44 27   AM-PAC Applied Cognition Inpatient   Following a Speech/Presentation 4   Understanding Ordinary Conversation 4   Taking Medications 4   Remembering Where Things Are Placed or Put Away 4   Remembering List of 4-5 Errands 4   Taking Care of Complicated Tasks 3   Applied Cognition Raw Score 23   Applied Cognition Standardized Score 53 08       Reshma Nicholson MS, OTR/L

## 2022-01-14 NOTE — PROGRESS NOTES
NEPHROLOGY PROGRESS NOTE    Brook Lu 80 y o  female MRN: [id number]  Unit/Bed#: [facility] 828-01 Encounter: [id number]  Reason for Consult:  Renal insufficiency    Patient is awake alert sitting up the chair feels well no acute changes or complaints  ASSESSMENT/PLAN:  1  Renal    Patient was seen perioperatively after laparoscopic right hemicolectomy  Patient is stable on liquid diet and slowly maintenance fluids  I reviewed labs today shows normal electrolytes metabolic state creatinine is at 0 9 so there is no acute insult and no active renal issue  Urinalysis from 2021 showed no proteinuria  Patient has no evidence clinically of intrinsic kidney disease  Postop care per surgery and they will decide when to stop fluids when she is eating normal diet  2  Status post laparoscopic right hemicolectomy for tubulovillous adenoma  Postop care per surgery  We will sign off please call if we can be of further assistance  SUBJECTIVE:  Review of Systems   Constitutional: Negative for chills, diaphoresis, fever and night sweats  HENT: Negative  Eyes: Negative  Cardiovascular: Negative for chest pain, dyspnea on exertion, leg swelling and orthopnea  Respiratory: Negative  Negative for cough, shortness of breath, sputum production and wheezing  Gastrointestinal: Negative for bloating, nausea and vomiting  Postoperative discomfort in abdomen  Genitourinary: Negative for dysuria, flank pain and incomplete emptying  Catheter out and no urinary complaints  Neurological: Negative for dizziness, focal weakness, headaches and weakness  Psychiatric/Behavioral: Negative for altered mental status, depression, hallucinations and hypervigilance         OBJECTIVE:  Current Weight: Weight - Scale: 59 kg (130 lb)  Vitals:Temp (24hrs), Av 6 °F (36 4 °C), Min:97 2 °F (36 2 °C), Max:98 2 °F (36 8 °C)  Current: Temperature: 98 °F (36 7 °C)   Blood pressure 128/57, pulse 65, temperature 98 °F (36 7 °C), resp  rate 16, height 5' 3" (1 6 m), weight 59 kg (130 lb), SpO2 98 %  , Body mass index is 23 03 kg/m²  Intake/Output Summary (Last 24 hours) at 1/14/2022 1337  Last data filed at 1/14/2022 1101  Gross per 24 hour   Intake 3538 85 ml   Output 1500 ml   Net 2038  85 ml       Physical Exam: /57   Pulse 65   Temp 98 °F (36 7 °C)   Resp 16   Ht 5' 3" (1 6 m)   Wt 59 kg (130 lb)   SpO2 98%   BMI 23 03 kg/m²   Physical Exam  Constitutional:       General: She is not in acute distress  Appearance: She is not toxic-appearing or diaphoretic  HENT:      Head: Normocephalic and atraumatic  Mouth/Throat:      Mouth: Mucous membranes are dry  Eyes:      General: No scleral icterus  Extraocular Movements: Extraocular movements intact  Cardiovascular:      Rate and Rhythm: Normal rate and regular rhythm  Heart sounds: No friction rub  No gallop  Pulmonary:      Effort: Pulmonary effort is normal  No respiratory distress  Breath sounds: Normal breath sounds  No wheezing, rhonchi or rales  Abdominal:      General: Bowel sounds are normal  There is no distension  Comments: Mild tenderness postop   Musculoskeletal:      Cervical back: Normal range of motion and neck supple  Neurological:      Mental Status: She is alert and oriented to person, place, and time  Mental status is at baseline  Psychiatric:         Mood and Affect: Mood normal          Behavior: Behavior normal          Thought Content:  Thought content normal          Judgment: Judgment normal          Medications:    Current Facility-Administered Medications:     acetaminophen (TYLENOL) tablet 975 mg, 975 mg, Oral, Q8H [address], Letitia Novak MD, 975 mg at 01/14/22 0516    dextrose 5 % and sodium chloride 0 45 % with KCl 20 mEq/L infusion, 75 mL/hr, Intravenous, Continuous, Letitia Novak MD, Last Rate: 75 mL/hr at 01/14/22 0906, 75 mL/hr at 01/14/22 0906    heparin (porcine) subcutaneous injection 5,000 Units, 5,000 Units, Subcutaneous, Q8H [address], Prudence Pallas, MD, 5,000 Units at 01/14/22 1324    HYDROmorphone (DILAUDID) 1 mg/mL 50 mL PCA, , Intravenous, Continuous, Prudence Pallas, MD, Rate Verify at 01/14/22 0645    HYDROmorphone HCl (DILAUDID) injection 0 2 mg, 0 2 mg, Intravenous, Q3H PRN, Prudence Pallas, MD    lactated ringers bolus 1,000 mL, 1,000 mL, Intravenous, Once PRN **AND** lactated ringers bolus 1,000 mL, 1,000 mL, Intravenous, Once PRN, Prudence Pallas, MD    naloxone (NARCAN) 0 04 mg/mL syringe 0 04 mg, 0 04 mg, Intravenous, Q1MIN PRN, Prudence Pallas, MD    nebivolol (BYSTOLIC) tablet 5 mg, 5 mg, Oral, Daily, Prudence Pallas, MD, 5 mg at 01/14/22 0915    ondansetron (ZOFRAN) injection 4 mg, 4 mg, Intravenous, Q6H PRN, Prudence Pallas, MD    potassium phosphates 12 mmol in sodium chloride 0 9 % 250 mL infusion, 12 mmol, Intravenous, Once, Prudence Pallas, MD, Last Rate: 62 5 mL/hr at 01/14/22 1116, 12 mmol at 01/14/22 1116    sodium chloride 0 9 % bolus 1,000 mL, 1,000 mL, Intravenous, Once PRN **AND** sodium chloride 0 9 % bolus 1,000 mL, 1,000 mL, Intravenous, Once PRN, Prudence Pallas, MD    verapamil (CALAN-SR) CR tablet 180 mg, 180 mg, Oral, HS, Prudence Pallas, MD    Laboratory Results:  Lab Results   Component Value Date    WBC 8 15 01/14/2022    HGB 11 6 01/14/2022    HCT 35 4 01/14/2022    MCV 93 01/14/2022     01/14/2022     Lab Results   Component Value Date    SODIUM 138 01/14/2022    K 3 5 01/14/2022     01/14/2022    CO2 26 01/14/2022    BUN 14 01/14/2022    CREATININE 0 93 01/14/2022    GLUC 101 01/14/2022    CALCIUM 8 5 01/14/2022     Lab Results   Component Value Date    CALCIUM 8 5 01/14/2022    PHOS 2 9 01/14/2022     No results found for: LABPROT

## 2022-01-14 NOTE — UTILIZATION REVIEW
Inpatient Admission Authorization Request   NOTIFICATION OF INPATIENT ADMISSION/INPATIENT AUTHORIZATION REQUEST   SERVICING FACILITY:   [address] [place] [address]  Address: [address] [place] [address], [address] [place] [address]  Tax ID: [id number]  NPI: [id number]  Place of Service: Inpatient [address] [facility] Code: 24     ATTENDING PROVIDER:  Attending Name and NPI#: Jane Peters Md [[id number]]  Address: [address] [place] [address], [address] [place] [address]  Phone: [phone]     UTILIZATION REVIEW CONTACT:  Sandrita Sotomayor Utilization   Network Utilization Review Department  Phone: [phone]  Fax: [phone]  Email: [email or web address]     PHYSICIAN ADVISORY SERVICES:  FOR [id number] REVIEW - MEDICAL NECESSITY DENIAL  Phone: [phone]  Fax: [phone]  Email: [email or web address]     TYPE OF REQUEST:  Inpatient Status     ADMISSION INFORMATION:  ADMISSION DATE/TIME: 1/13/22  1:30 PM  PATIENT DIAGNOSIS CODE/DESCRIPTION:  Tubulovillous adenoma [D36 9]  DISCHARGE DATE/TIME: No discharge date for patient encounter  DISCHARGE DISPOSITION (IF DISCHARGED): Home/Self Care     IMPORTANT INFORMATION:  Please contact the [facility] directly with any questions or concerns regarding this request  Department voicemails are confidential     Send requests for admission clinical reviews, concurrent reviews, approvals, and administrative denials due to lack of clinical to fax [phone]

## 2022-01-15 LAB
ANION GAP SERPL CALCULATED.3IONS-SCNC: 4 MMOL/L (ref 4–13)
BASOPHILS # BLD AUTO: 0.01 THOUSANDS/ΜL (ref 0–0.1)
BASOPHILS NFR BLD AUTO: 0 % (ref 0–1)
BUN SERPL-MCNC: 8 MG/DL (ref 5–25)
CALCIUM SERPL-MCNC: 7.9 MG/DL (ref 8.3–10.1)
CHLORIDE SERPL-SCNC: 109 MMOL/L (ref 100–108)
CO2 SERPL-SCNC: 26 MMOL/L (ref 21–32)
CREAT SERPL-MCNC: 0.86 MG/DL (ref 0.6–1.3)
EOSINOPHIL # BLD AUTO: 0.01 THOUSAND/ΜL (ref 0–0.61)
EOSINOPHIL NFR BLD AUTO: 0 % (ref 0–6)
ERYTHROCYTE [DISTWIDTH] IN BLOOD BY AUTOMATED COUNT: 13.2 % (ref 11.6–15.1)
GFR SERPL CREATININE-BSD FRML MDRD: 63 ML/MIN/1.73SQ M
GLUCOSE SERPL-MCNC: 98 MG/DL (ref 65–140)
HCT VFR BLD AUTO: 32.6 % (ref 34.8–46.1)
HGB BLD-MCNC: 10.7 G/DL (ref 11.5–15.4)
IMM GRANULOCYTES # BLD AUTO: 0.03 THOUSAND/UL (ref 0–0.2)
IMM GRANULOCYTES NFR BLD AUTO: 0 % (ref 0–2)
LYMPHOCYTES # BLD AUTO: 1.32 THOUSANDS/ΜL (ref 0.6–4.47)
LYMPHOCYTES NFR BLD AUTO: 17 % (ref 14–44)
MAGNESIUM SERPL-MCNC: 1.9 MG/DL (ref 1.6–2.6)
MCH RBC QN AUTO: 30.7 PG (ref 26.8–34.3)
MCHC RBC AUTO-ENTMCNC: 32.8 G/DL (ref 31.4–37.4)
MCV RBC AUTO: 94 FL (ref 82–98)
MONOCYTES # BLD AUTO: 0.82 THOUSAND/ΜL (ref 0.17–1.22)
MONOCYTES NFR BLD AUTO: 10 % (ref 4–12)
NEUTROPHILS # BLD AUTO: 5.8 THOUSANDS/ΜL (ref 1.85–7.62)
NEUTS SEG NFR BLD AUTO: 73 % (ref 43–75)
NRBC BLD AUTO-RTO: 0 /100 WBCS
PHOSPHATE SERPL-MCNC: 1.5 MG/DL (ref 2.3–4.1)
PLATELET # BLD AUTO: 159 THOUSANDS/UL (ref 149–390)
PMV BLD AUTO: 11.6 FL (ref 8.9–12.7)
POTASSIUM SERPL-SCNC: 3.8 MMOL/L (ref 3.5–5.3)
RBC # BLD AUTO: 3.48 MILLION/UL (ref 3.81–5.12)
SODIUM SERPL-SCNC: 139 MMOL/L (ref 136–145)
WBC # BLD AUTO: 7.99 THOUSAND/UL (ref 4.31–10.16)

## 2022-01-15 PROCEDURE — 84100 ASSAY OF PHOSPHORUS: CPT | Performed by: STUDENT IN AN ORGANIZED HEALTH CARE EDUCATION/TRAINING PROGRAM

## 2022-01-15 PROCEDURE — 83735 ASSAY OF MAGNESIUM: CPT | Performed by: STUDENT IN AN ORGANIZED HEALTH CARE EDUCATION/TRAINING PROGRAM

## 2022-01-15 PROCEDURE — 80048 BASIC METABOLIC PNL TOTAL CA: CPT | Performed by: STUDENT IN AN ORGANIZED HEALTH CARE EDUCATION/TRAINING PROGRAM

## 2022-01-15 PROCEDURE — 85025 COMPLETE CBC W/AUTO DIFF WBC: CPT | Performed by: STUDENT IN AN ORGANIZED HEALTH CARE EDUCATION/TRAINING PROGRAM

## 2022-01-15 RX ORDER — OXYCODONE HYDROCHLORIDE 5 MG/1
5 TABLET ORAL EVERY 4 HOURS PRN
Status: DISCONTINUED | OUTPATIENT
Start: 2022-01-15 | End: 2022-01-17 | Stop reason: HOSPADM

## 2022-01-15 RX ORDER — OXYCODONE HYDROCHLORIDE 5 MG/1
2.5 TABLET ORAL EVERY 4 HOURS PRN
Status: DISCONTINUED | OUTPATIENT
Start: 2022-01-15 | End: 2022-01-17 | Stop reason: HOSPADM

## 2022-01-15 RX ORDER — MAGNESIUM SULFATE HEPTAHYDRATE 40 MG/ML
2 INJECTION, SOLUTION INTRAVENOUS ONCE
Status: COMPLETED | OUTPATIENT
Start: 2022-01-15 | End: 2022-01-15

## 2022-01-15 RX ADMIN — HEPARIN SODIUM 5000 UNITS: 5000 INJECTION INTRAVENOUS; SUBCUTANEOUS at 21:29

## 2022-01-15 RX ADMIN — MAGNESIUM SULFATE HEPTAHYDRATE 2 G: 40 INJECTION, SOLUTION INTRAVENOUS at 08:21

## 2022-01-15 RX ADMIN — ACETAMINOPHEN 975 MG: 325 TABLET, FILM COATED ORAL at 13:31

## 2022-01-15 RX ADMIN — HEPARIN SODIUM 5000 UNITS: 5000 INJECTION INTRAVENOUS; SUBCUTANEOUS at 13:31

## 2022-01-15 RX ADMIN — ONDANSETRON 4 MG: 2 INJECTION INTRAMUSCULAR; INTRAVENOUS at 21:29

## 2022-01-15 RX ADMIN — POTASSIUM PHOSPHATE, MONOBASIC AND POTASSIUM PHOSPHATE, DIBASIC 30 MMOL: 224; 236 INJECTION, SOLUTION, CONCENTRATE INTRAVENOUS at 10:58

## 2022-01-15 RX ADMIN — ACETAMINOPHEN 975 MG: 325 TABLET, FILM COATED ORAL at 05:33

## 2022-01-15 RX ADMIN — HEPARIN SODIUM 5000 UNITS: 5000 INJECTION INTRAVENOUS; SUBCUTANEOUS at 05:33

## 2022-01-15 RX ADMIN — VERAPAMIL HYDROCHLORIDE 180 MG: 180 TABLET ORAL at 21:30

## 2022-01-15 RX ADMIN — ACETAMINOPHEN 975 MG: 325 TABLET, FILM COATED ORAL at 21:28

## 2022-01-15 RX ADMIN — DEXTROSE, SODIUM CHLORIDE, AND POTASSIUM CHLORIDE 75 ML/HR: 5; .45; .15 INJECTION INTRAVENOUS at 21:37

## 2022-01-15 RX ADMIN — NEBIVOLOL HYDROCHLORIDE 5 MG: 5 TABLET ORAL at 08:20

## 2022-01-15 NOTE — PROGRESS NOTES
Progress Note -Colorectal Surgery  Satish Lima 80 y o  female MRN: [id number]  Unit/Bed#: [facility] 828-01 Encounter: [id number]    Assessment:  Patient is a 80 y o  female who presented with tubulovillous adenoma, now status post laparoscopic right hemicolectomy on 1/13  Plan:  Clears/toast today  Continue MIVF at 75  Continue PCA while awaiting return of bowel function  Holding xarelto  OOB/ambulate  SQH/SCDs      Subjective/Objective     Subjective:   No acute events overnight  Denies hunger, did tolerate some jello and juice yesterday  No bowel function yet  Is voiding spontaneously  Objective:    Blood pressure (!) 123/48, pulse 63, temperature 98 9 °F (37 2 °C), resp  rate 18, height 5' 3" (1 6 m), weight 59 kg (130 lb), SpO2 91 %  ,Body mass index is 23 03 kg/m²  Intake/Output Summary (Last 24 hours) at 1/15/2022 0319  Last data filed at 1/14/2022 2227  Gross per 24 hour   Intake 3339 17 ml   Output 2275 ml   Net 1064 17 ml       Invasive Devices  Report    Peripheral Intravenous Line            Peripheral IV 01/13/22 Right Hand 1 day                Physical Exam  Vitals reviewed  Constitutional:       General: She is not in acute distress  Appearance: She is not ill-appearing, toxic-appearing or diaphoretic  HENT:      Head: Normocephalic and atraumatic  Eyes:      Extraocular Movements: Extraocular movements intact  Cardiovascular:      Rate and Rhythm: Normal rate  Pulmonary:      Effort: Pulmonary effort is normal  No respiratory distress  Abdominal:      General: There is no distension  Palpations: Abdomen is soft  Tenderness: There is abdominal tenderness (appropriately)  There is no guarding or rebound  Comments: Incisions clean, dry and intact   Skin:     General: Skin is warm and dry  Neurological:      Mental Status: She is alert and oriented to person, place, and time  Mental status is at baseline     Psychiatric:         Mood and Affect: Mood normal  Behavior: Behavior normal              Results from last 7 days   Lab Units 01/14/22  0528 01/13/22  1625   WBC Thousand/uL 8 15  --    HEMOGLOBIN g/dL 11 6  --    HEMATOCRIT % 35 4  --    PLATELETS Thousands/uL 181 190     Results from last 7 days   Lab Units 01/14/22  0528   POTASSIUM mmol/L 3 5   CHLORIDE mmol/L 105   CO2 mmol/L 26   BUN mg/dL 14   CREATININE mg/dL 0 93   CALCIUM mg/dL 8 5

## 2022-01-16 LAB
ANION GAP SERPL CALCULATED.3IONS-SCNC: 5 MMOL/L (ref 4–13)
BASOPHILS # BLD AUTO: 0.02 THOUSANDS/ΜL (ref 0–0.1)
BASOPHILS NFR BLD AUTO: 0 % (ref 0–1)
BUN SERPL-MCNC: 8 MG/DL (ref 5–25)
CALCIUM SERPL-MCNC: 8 MG/DL (ref 8.3–10.1)
CHLORIDE SERPL-SCNC: 109 MMOL/L (ref 100–108)
CO2 SERPL-SCNC: 24 MMOL/L (ref 21–32)
CREAT SERPL-MCNC: 0.79 MG/DL (ref 0.6–1.3)
EOSINOPHIL # BLD AUTO: 0.08 THOUSAND/ΜL (ref 0–0.61)
EOSINOPHIL NFR BLD AUTO: 1 % (ref 0–6)
ERYTHROCYTE [DISTWIDTH] IN BLOOD BY AUTOMATED COUNT: 13.2 % (ref 11.6–15.1)
GFR SERPL CREATININE-BSD FRML MDRD: 70 ML/MIN/1.73SQ M
GLUCOSE SERPL-MCNC: 92 MG/DL (ref 65–140)
HCT VFR BLD AUTO: 32.9 % (ref 34.8–46.1)
HGB BLD-MCNC: 10.6 G/DL (ref 11.5–15.4)
IMM GRANULOCYTES # BLD AUTO: 0.05 THOUSAND/UL (ref 0–0.2)
IMM GRANULOCYTES NFR BLD AUTO: 1 % (ref 0–2)
LYMPHOCYTES # BLD AUTO: 1.15 THOUSANDS/ΜL (ref 0.6–4.47)
LYMPHOCYTES NFR BLD AUTO: 13 % (ref 14–44)
MAGNESIUM SERPL-MCNC: 1.7 MG/DL (ref 1.6–2.6)
MCH RBC QN AUTO: 30.5 PG (ref 26.8–34.3)
MCHC RBC AUTO-ENTMCNC: 32.2 G/DL (ref 31.4–37.4)
MCV RBC AUTO: 95 FL (ref 82–98)
MONOCYTES # BLD AUTO: 0.86 THOUSAND/ΜL (ref 0.17–1.22)
MONOCYTES NFR BLD AUTO: 10 % (ref 4–12)
NEUTROPHILS # BLD AUTO: 6.83 THOUSANDS/ΜL (ref 1.85–7.62)
NEUTS SEG NFR BLD AUTO: 75 % (ref 43–75)
NRBC BLD AUTO-RTO: 0 /100 WBCS
PHOSPHATE SERPL-MCNC: 1.5 MG/DL (ref 2.3–4.1)
PLATELET # BLD AUTO: 161 THOUSANDS/UL (ref 149–390)
PMV BLD AUTO: 11.7 FL (ref 8.9–12.7)
POTASSIUM SERPL-SCNC: 3.4 MMOL/L (ref 3.5–5.3)
RBC # BLD AUTO: 3.48 MILLION/UL (ref 3.81–5.12)
SODIUM SERPL-SCNC: 138 MMOL/L (ref 136–145)
WBC # BLD AUTO: 8.99 THOUSAND/UL (ref 4.31–10.16)

## 2022-01-16 PROCEDURE — 84100 ASSAY OF PHOSPHORUS: CPT | Performed by: STUDENT IN AN ORGANIZED HEALTH CARE EDUCATION/TRAINING PROGRAM

## 2022-01-16 PROCEDURE — 83735 ASSAY OF MAGNESIUM: CPT | Performed by: STUDENT IN AN ORGANIZED HEALTH CARE EDUCATION/TRAINING PROGRAM

## 2022-01-16 PROCEDURE — 85025 COMPLETE CBC W/AUTO DIFF WBC: CPT | Performed by: STUDENT IN AN ORGANIZED HEALTH CARE EDUCATION/TRAINING PROGRAM

## 2022-01-16 PROCEDURE — 80048 BASIC METABOLIC PNL TOTAL CA: CPT | Performed by: STUDENT IN AN ORGANIZED HEALTH CARE EDUCATION/TRAINING PROGRAM

## 2022-01-16 RX ORDER — MAGNESIUM SULFATE HEPTAHYDRATE 40 MG/ML
2 INJECTION, SOLUTION INTRAVENOUS ONCE
Status: COMPLETED | OUTPATIENT
Start: 2022-01-16 | End: 2022-01-16

## 2022-01-16 RX ADMIN — ACETAMINOPHEN 975 MG: 325 TABLET, FILM COATED ORAL at 06:13

## 2022-01-16 RX ADMIN — POTASSIUM PHOSPHATE, MONOBASIC AND POTASSIUM PHOSPHATE, DIBASIC 30 MMOL: 224; 236 INJECTION, SOLUTION, CONCENTRATE INTRAVENOUS at 11:48

## 2022-01-16 RX ADMIN — HEPARIN SODIUM 5000 UNITS: 5000 INJECTION INTRAVENOUS; SUBCUTANEOUS at 13:27

## 2022-01-16 RX ADMIN — VERAPAMIL HYDROCHLORIDE 180 MG: 180 TABLET ORAL at 21:29

## 2022-01-16 RX ADMIN — HEPARIN SODIUM 5000 UNITS: 5000 INJECTION INTRAVENOUS; SUBCUTANEOUS at 06:13

## 2022-01-16 RX ADMIN — NEBIVOLOL HYDROCHLORIDE 5 MG: 5 TABLET ORAL at 08:33

## 2022-01-16 RX ADMIN — ACETAMINOPHEN 975 MG: 325 TABLET, FILM COATED ORAL at 13:26

## 2022-01-16 RX ADMIN — HEPARIN SODIUM 5000 UNITS: 5000 INJECTION INTRAVENOUS; SUBCUTANEOUS at 21:29

## 2022-01-16 RX ADMIN — ACETAMINOPHEN 975 MG: 325 TABLET, FILM COATED ORAL at 21:29

## 2022-01-16 RX ADMIN — MAGNESIUM SULFATE HEPTAHYDRATE 2 G: 40 INJECTION, SOLUTION INTRAVENOUS at 08:38

## 2022-01-16 NOTE — PROGRESS NOTES
Progress Note - Colorectal Surgery   Pamla American 80 y o  female MRN: [id number]  Unit/Bed#: [facility] 828-01 Encounter: [id number]    Assessment:  Patient is a 80 y o  female who presented with tubulovillous adenoma, now status post laparoscopic right hemicolectomy on 1/13  Afebrile,   Isolated HTN  Stable VS on room air  U O: 950 L  Having flatus  Having bowel movements per patient but none recorded    Plan:  Clears/toast today- advance  Continue MIVF at 75- consider dc  Holding xarelto  OOB/ambulate  SQH/SCDs    Subjective/Objective     Subjective:   No acute events overnight  +BM per patient but none recorded  Pain controlled off PCA  -N  -V      Objective:     Blood pressure 94/65, pulse 75, temperature 98 4 °F (36 9 °C), temperature source Oral, resp  rate 18, height 5' 3" (1 6 m), weight 59 kg (130 lb), SpO2 91 %  ,Body mass index is 23 03 kg/m²  Intake/Output Summary (Last 24 hours) at 1/15/2022 1948  Last data filed at 1/15/2022 1916  Gross per 24 hour   Intake 258 75 ml   Output 800 ml   Net -541 25 ml       Invasive Devices  Report    Peripheral Intravenous Line            Peripheral IV 01/15/22 Dorsal (posterior); Right Forearm <1 day                Physical Exam:   Gen: NAD, Comfortable  Neuro: A&O, No focal deficits  Head: Normal Cephalic, Atraumatic  Eye: EOMI, PERRLA, No scleral icterus  Neck: Supple, No JVD, Midline trachea  CV: RRR, Cap refill <2 sec  Pulm: Normal work of breathing, no respiratory distress  Abd: Soft, Non-Distended, Non-Tender  Ext: No edema, Non-tender  Skin: warm, dry, intact

## 2022-01-17 VITALS
OXYGEN SATURATION: 91 % | WEIGHT: 130 LBS | DIASTOLIC BLOOD PRESSURE: 54 MMHG | HEART RATE: 63 BPM | TEMPERATURE: 98.4 F | BODY MASS INDEX: 23.04 KG/M2 | RESPIRATION RATE: 16 BRPM | SYSTOLIC BLOOD PRESSURE: 108 MMHG | HEIGHT: 63 IN

## 2022-01-17 LAB
ANION GAP SERPL CALCULATED.3IONS-SCNC: 5 MMOL/L (ref 4–13)
BASOPHILS # BLD AUTO: 0.02 THOUSANDS/ΜL (ref 0–0.1)
BASOPHILS NFR BLD AUTO: 0 % (ref 0–1)
BUN SERPL-MCNC: 11 MG/DL (ref 5–25)
CALCIUM SERPL-MCNC: 8.6 MG/DL (ref 8.3–10.1)
CHLORIDE SERPL-SCNC: 108 MMOL/L (ref 100–108)
CO2 SERPL-SCNC: 25 MMOL/L (ref 21–32)
CREAT SERPL-MCNC: 0.8 MG/DL (ref 0.6–1.3)
EOSINOPHIL # BLD AUTO: 0.16 THOUSAND/ΜL (ref 0–0.61)
EOSINOPHIL NFR BLD AUTO: 2 % (ref 0–6)
ERYTHROCYTE [DISTWIDTH] IN BLOOD BY AUTOMATED COUNT: 13.2 % (ref 11.6–15.1)
GFR SERPL CREATININE-BSD FRML MDRD: 69 ML/MIN/1.73SQ M
GLUCOSE SERPL-MCNC: 79 MG/DL (ref 65–140)
HCT VFR BLD AUTO: 33.6 % (ref 34.8–46.1)
HGB BLD-MCNC: 10.8 G/DL (ref 11.5–15.4)
IMM GRANULOCYTES # BLD AUTO: 0.05 THOUSAND/UL (ref 0–0.2)
IMM GRANULOCYTES NFR BLD AUTO: 1 % (ref 0–2)
LYMPHOCYTES # BLD AUTO: 1.02 THOUSANDS/ΜL (ref 0.6–4.47)
LYMPHOCYTES NFR BLD AUTO: 11 % (ref 14–44)
MAGNESIUM SERPL-MCNC: 1.6 MG/DL (ref 1.6–2.6)
MCH RBC QN AUTO: 30.7 PG (ref 26.8–34.3)
MCHC RBC AUTO-ENTMCNC: 32.1 G/DL (ref 31.4–37.4)
MCV RBC AUTO: 96 FL (ref 82–98)
MONOCYTES # BLD AUTO: 0.7 THOUSAND/ΜL (ref 0.17–1.22)
MONOCYTES NFR BLD AUTO: 8 % (ref 4–12)
NEUTROPHILS # BLD AUTO: 7.02 THOUSANDS/ΜL (ref 1.85–7.62)
NEUTS SEG NFR BLD AUTO: 78 % (ref 43–75)
NRBC BLD AUTO-RTO: 0 /100 WBCS
PHOSPHATE SERPL-MCNC: 1.8 MG/DL (ref 2.3–4.1)
PLATELET # BLD AUTO: 176 THOUSANDS/UL (ref 149–390)
PMV BLD AUTO: 11.1 FL (ref 8.9–12.7)
POTASSIUM SERPL-SCNC: 3.4 MMOL/L (ref 3.5–5.3)
RBC # BLD AUTO: 3.52 MILLION/UL (ref 3.81–5.12)
SODIUM SERPL-SCNC: 138 MMOL/L (ref 136–145)
WBC # BLD AUTO: 8.97 THOUSAND/UL (ref 4.31–10.16)

## 2022-01-17 PROCEDURE — 83735 ASSAY OF MAGNESIUM: CPT | Performed by: STUDENT IN AN ORGANIZED HEALTH CARE EDUCATION/TRAINING PROGRAM

## 2022-01-17 PROCEDURE — 80048 BASIC METABOLIC PNL TOTAL CA: CPT | Performed by: STUDENT IN AN ORGANIZED HEALTH CARE EDUCATION/TRAINING PROGRAM

## 2022-01-17 PROCEDURE — 84100 ASSAY OF PHOSPHORUS: CPT | Performed by: STUDENT IN AN ORGANIZED HEALTH CARE EDUCATION/TRAINING PROGRAM

## 2022-01-17 PROCEDURE — 85025 COMPLETE CBC W/AUTO DIFF WBC: CPT | Performed by: STUDENT IN AN ORGANIZED HEALTH CARE EDUCATION/TRAINING PROGRAM

## 2022-01-17 RX ORDER — OXYCODONE HYDROCHLORIDE 5 MG/1
2.5 TABLET ORAL EVERY 6 HOURS PRN
Qty: 8 TABLET | Refills: 0 | Status: SHIPPED | OUTPATIENT
Start: 2022-01-17 | End: 2022-01-21

## 2022-01-17 RX ORDER — MAGNESIUM SULFATE HEPTAHYDRATE 40 MG/ML
2 INJECTION, SOLUTION INTRAVENOUS ONCE
Status: COMPLETED | OUTPATIENT
Start: 2022-01-17 | End: 2022-01-17

## 2022-01-17 RX ORDER — POTASSIUM CHLORIDE 20 MEQ/1
40 TABLET, EXTENDED RELEASE ORAL ONCE
Status: COMPLETED | OUTPATIENT
Start: 2022-01-17 | End: 2022-01-17

## 2022-01-17 RX ADMIN — NEBIVOLOL HYDROCHLORIDE 5 MG: 5 TABLET ORAL at 08:23

## 2022-01-17 RX ADMIN — POTASSIUM CHLORIDE 40 MEQ: 1500 TABLET, EXTENDED RELEASE ORAL at 08:23

## 2022-01-17 RX ADMIN — MAGNESIUM SULFATE HEPTAHYDRATE 2 G: 40 INJECTION, SOLUTION INTRAVENOUS at 08:23

## 2022-01-17 RX ADMIN — SODIUM PHOSPHATE, MONOBASIC, MONOHYDRATE AND SODIUM PHOSPHATE, DIBASIC, ANHYDROUS 30 MMOL: 276; 142 INJECTION, SOLUTION INTRAVENOUS at 11:28

## 2022-01-17 RX ADMIN — ACETAMINOPHEN 975 MG: 325 TABLET, FILM COATED ORAL at 05:36

## 2022-01-17 RX ADMIN — HEPARIN SODIUM 5000 UNITS: 5000 INJECTION INTRAVENOUS; SUBCUTANEOUS at 05:37

## 2022-01-17 NOTE — PROGRESS NOTES
Progress Note -Colorectal Surgery  Jelena Avilez 80 y o  female MRN: [id number]  Unit/Bed#: [facility] 828-01 Encounter: [id number]    Assessment:  Patient is a 80 y o  female who presented with tubulovillous adenoma, now status post laparoscopic right hemicolectomy on 1/13  Uop: 1 3L  Stool x3     Plan:  Lo res  Pain control  DVT px  Dc later today    Subjective/Objective     Subjective:   No acute events overnight  Objective:    Blood pressure 142/61, pulse 76, temperature 98 1 °F (36 7 °C), temperature source Oral, resp  rate 18, height 5' 3" (1 6 m), weight 59 kg (130 lb), SpO2 (!) 89 %  ,Body mass index is 23 03 kg/m²  Intake/Output Summary (Last 24 hours) at 1/16/2022 2305  Last data filed at 1/16/2022 2001  Gross per 24 hour   Intake 425 ml   Output 1100 ml   Net -675 ml       Invasive Devices  Report    Peripheral Intravenous Line            Peripheral IV 01/15/22 Dorsal (posterior); Right Forearm 1 day                Physical Exam  Vitals reviewed  Constitutional:       General: She is not in acute distress  Appearance: She is not ill-appearing, toxic-appearing or diaphoretic  HENT:      Head: Normocephalic and atraumatic  Eyes:      Extraocular Movements: Extraocular movements intact  Cardiovascular:      Rate and Rhythm: Normal rate  Pulmonary:      Effort: Pulmonary effort is normal  No respiratory distress  Abdominal:      General: There is no distension  Palpations: Abdomen is soft  Tenderness: There is no abdominal tenderness  There is no guarding or rebound  Comments: Incisions clean, dry and intact   Skin:     General: Skin is warm and dry  Neurological:      Mental Status: She is alert and oriented to person, place, and time  Mental status is at baseline     Psychiatric:         Mood and Affect: Mood normal          Behavior: Behavior normal              Results from last 7 days   Lab Units 01/16/22  0644 01/15/22  0509 01/14/22  0528   WBC Thousand/uL 8 99 7  99 8 15   HEMOGLOBIN g/dL 10 6* 10 7* 11 6   HEMATOCRIT % 32 9* 32 6* 35 4   PLATELETS Thousands/uL 161 159 181     Results from last 7 days   Lab Units 01/16/22  0644 01/15/22  0509 01/14/22  0528   POTASSIUM mmol/L 3 4* 3 8 3 5   CHLORIDE mmol/L 109* 109* 105   CO2 mmol/L 24 26 26   BUN mg/dL 8 8 14   CREATININE mg/dL 0 79 0 86 0 93   CALCIUM mg/dL 8 0* 7 9* 8 5

## 2022-01-17 NOTE — DISCHARGE INSTR - AVS FIRST PAGE
DISCHARGE INSTRUCTIONS    Follow Up: Follow up with Dr Mariposa Bateman in 3-4 weeks   -Resume Xarelto on 1/18    Diet: Low residue/Low fiber diet for 5 more days, then may resume regular diet    Pain: Oxycodone 2 5mg as needed for moderate/severe pain  Tylenol for mild pain control  Shower: You may shower over the wound  Do not bathe or use a pool or hot tub until cleared by the physician  Activity: As tolerated  You may go up and down stairs, walk as much as you are comfortable, but walk at least 3 times each day  Do not lift anything heavier than 15 pounds for at least 2-4 weeks, unless cleared by the doctor  Medications: Resume all of your previous medications, unless told otherwise by the doctor  You do not need to take the narcotic pain medications unless you are having significant pain and discomfort  Call the office: If you are experiencing any of the following, fevers above 101 5° or chills, significant nausea or vomiting, increase in abdominal pain, if the wound develops drainage and/or is excessive redness around the wound, or if you have significant diarrhea or other worsening symptoms

## 2022-01-17 NOTE — DISCHARGE INSTRUCTIONS
DISCHARGE INSTRUCTIONS    Follow Up: Follow up with Dr Rock Landeros in 3-4 weeks   -Resume Xarelto on 1/18    Diet: Low residue/Low fiber diet for 5 more days, then may resume regular diet    Pain: Oxycodone 2 5mg as needed for moderate/severe pain  Tylenol for mild pain control  Shower: You may shower over the wound  Do not bathe or use a pool or hot tub until cleared by the physician  Activity: As tolerated  You may go up and down stairs, walk as much as you are comfortable, but walk at least 3 times each day  Do not lift anything heavier than 15 pounds for at least 2-4 weeks, unless cleared by the doctor  Medications: Resume all of your previous medications, unless told otherwise by the doctor  You do not need to take the narcotic pain medications unless you are having significant pain and discomfort  Call the office: If you are experiencing any of the following, fevers above 101 5° or chills, significant nausea or vomiting, increase in abdominal pain, if the wound develops drainage and/or is excessive redness around the wound, or if you have significant diarrhea or other worsening symptoms  Diverticulitis Diet   WHAT YOU NEED TO KNOW:   A diverticulitis diet includes foods that allow your intestines to rest while you have diverticulitis  Diverticulitis is a condition that causes small pockets along your intestine called diverticula to become inflamed or infected  This is caused by hard bowel movement, food, or bacteria that get stuck in the pockets  DISCHARGE INSTRUCTIONS:   Foods that may be recommended while you have diverticulitis:   · A clear liquid diet may be recommended for 2 to 3 days  A clear liquid diet includes clear liquids, and foods that are liquid at room temperature  Examples include the following:     ? Water and clear juices (such as apple, cranberry, or grape), strained citrus juices or fruit punch    ? Coffee or tea (without cream or milk)    ?  Clear sports drinks or soft drinks, such as ginger ale, lemon-lime soda, or club soda (no cola or root beer)    ? Clear broth, bouillon, or consommé    ? Plain popsicles (no popsicles with pureed fruit or fiber)    ? Flavored gelatin without fruit    · Low-fiber foods may be recommended until your symptoms improve  Examples include the following:     ? Cream of wheat and finely ground grits    ? White bread, white pasta, and white rice    ? Canned and well-cooked fruit without skins or seeds, and juice without pulp    ? Canned and well-cooked vegetables without skins or seeds, and vegetable juice    ? Cow's milk, lactose-free milk, soy milk, and rice milk    ? Yogurt, cottage cheese, and sherbet    ? Eggs, poultry (such as chicken and turkey), fish, and tender, ground, well-cooked beef     ? Tofu and smooth nut butters, such as peanut butter    ? Broth and strained soups made of low-fiber foods    High-fiber foods  can help prevent diverticulosis and diverticulitis  Your healthcare provider will tell you when you can add high-fiber foods back into your diet  Examples include the following:  · Whole grains and breads, and cereals made with whole grains    · Dried fruit, fresh fruit with skin, and fruit pulp    · Raw vegetables    · Cooked greens, such as spinach    · Tough meat and meat with gristle    · Legumes, such as mckeon beans and lentils       Call your doctor or dietitian if:   · Your symptoms do not get better, or they get worse  · You have questions about the foods you should eat  · You have questions or concerns about your condition or care  © Copyright [employer] 2021 Information is for End User's use only and may not be sold, redistributed or otherwise used for commercial purposes  All illustrations and images included in CareNotes® are the copyrighted property of A D A M , Inc  or Alexandra Diaz  The above information is an  only   It is not intended as medical advice for individual conditions or treatments  Talk to your doctor, nurse or pharmacist before following any medical regimen to see if it is safe and effective for you  Low Fiber Diet   WHAT YOU NEED TO KNOW:   A low-fiber diet limits foods that are high in fiber  Fiber is the part of fruits, vegetables, and grains that is not broken down by your body  You may need to follow this diet after surgery on your intestines  You may also need to follow this diet for certain conditions such as Crohn disease or ulcerative colitis  Ask your healthcare provider or dietitian how much fiber you can have each day  DISCHARGE INSTRUCTIONS:   Foods to include:  Read food labels to check the amount of fiber that are found in foods  Some foods that are low in fiber include the following:  · Grains:  Choose grains that have less than 2 grams of fiber in each serving  Examples include the following:    ? Cream of wheat and finely ground grits    ? Dry cereal made from rice    ? White bread, white pasta, and white rice    ? Crackers, bagels, and rolls made from white or refined flour    · Other foods:      ? Canned and well-cooked fruit without skins or seeds, and juice without pulp    ? Ripe bananas and melons    ? Canned and well-cooked vegetables without skins or seeds, and vegetable juice    ? Cow's milk, lactose-free milk, soy milk, and rice milk    ? Yogurt without nuts, fruit, or granola    ? Eggs, poultry (such as chicken and turkey), fish, and tender, ground, well-cooked beef    ? Tofu and smooth peanut butter    ?  Broth and strained soups made of low-fiber foods    Foods to avoid:   · Breads, cereals, crackers, and pasta made with whole wheat or whole grains (such as whole oats)    · Casiano [employer] Minor, wild rice, quinoa, kasha, and barley    · All fresh fruit with skin, except banana and melons    · Dried fruits and fruit juice with pulp    · Canned pineapple    · Raw vegetables    · Nuts, seeds, and popcorn    · Beans, nuts, peas, and lentils    · Tough meats    · Coconut and avocado    What else you should know about a low-fiber diet:  A low-fiber diet can decrease the amount of bowel movements you have  Drink liquids as directed to avoid constipation  Ask how much liquid to drink each day and which liquids are best for you  © Copyright [employer] 2021 Information is for End User's use only and may not be sold, redistributed or otherwise used for commercial purposes  All illustrations and images included in CareNotes® are the copyrighted property of A D A M , Inc  or [facility] Mirela Gtz   The above information is an  only  It is not intended as medical advice for individual conditions or treatments  Talk to your doctor, nurse or pharmacist before following any medical regimen to see if it is safe and effective for you

## 2022-01-17 NOTE — DISCHARGE SUMMARY
Discharge Summary - General Surgery   Jelena Avilez 80 y o  female MRN: [id number]  [facility] Encounter: [id number]    Admission Date: 1/13/2022     Discharge Date: 1/17/2022    Admitting Diagnosis: Tubulovillous adenoma [D36 9]    Discharge Diagnosis: Tubulovillous adenoma s/p Lap R Brien by Dr Vaibhav Hartman    Attending and Service: Dr Vaibhav Hartman, Colorectal Surgical Services  Consulting Physician(s): Nephro    Imaging and Procedures Performed:   1/13/2022: Lap R Brien by Dr Vaibhav Hartman    Pathology: Pending    Hospital Course: Jelena Avilez is a 77-year-old female with PMHx of A-Fib on Xarelto, HTN, and diverticulosis presented for outpatient elective surgical intervention  Patient underwent surgical intervention as mentioned above without complications  Intraoperative findings include "mid ascending colon tattoo located, mobile cecum appendix in the right upper quadrant and ileum to midtransverse colon ileocolic side-to-side functional end-to-end stapled anastomosis "     Post-operatively patient was transferred to the medical surgical floor, remained NPO on IVF, contreras cath in place for adequate I/Os, PCA-D for pain control, ISS, and DVT prophylaxis  On POD1 her diet was advanced to CLD and her contreras was removed  Nephro was consulted reflexively from pre-op in order to prevent DARRIUS  Her diet was slowly advanced throughout the rest of her hospital stay as bowel function returned  Patient was cleared for d/c from a colorectal surgery perspective on POD4 as she was tolerating diet without N/V, OOB and ambulating, passing flatus and having BMs, pain remained controlled on current regimen, she was urinating without difficulties, and she was utilizing her ISS  All discharge instructions were discussed with the patient and she is in agreement with plan  Oxycodone 2 5mg every 6 hours as needed for moderate/severe pain, no refills, sent to patient's pharmacy      On discharge, the patient is instructed to follow-up with the patient's primary care provider within the next 2 weeks to review the events of the recent hospitalization  The patient is instructed to follow-up with Dr Mariposa Bateman in 3-4 weeks  The patient is instructed to follow the provided discharge instructions  Condition at Discharge: good     Discharge instructions/Information to patient and family:   See after visit summary for information provided to patient and family  Provisions for Follow-Up Care:  See after visit summary for information related to follow-up care and any pertinent home health orders  Disposition: Home    Planned Readmission: No    Discharge Statement   I spent 30 minutes discharging the patient  This time was spent on the day of discharge  I had direct contact with the patient on the day of discharge  Additional documentation is required if more than 30 minutes were spent on discharge  Discharge Medications:  See after visit summary for reconciled discharge medications provided to patient and family      Zack Suh PA-C  1/17/2022  9:31 AM

## 2022-01-18 ENCOUNTER — TRANSITIONAL CARE MANAGEMENT (OUTPATIENT)
Dept: INTERNAL MEDICINE CLINIC | Facility: CLINIC | Age: 82
End: 2022-01-18

## 2022-01-18 NOTE — UTILIZATION REVIEW
Inpatient Admission Authorization Request   NOTIFICATION OF INPATIENT ADMISSION/INPATIENT AUTHORIZATION REQUEST   SERVICING FACILITY:   [facility]  Address: [address] [place] [address], [address] [place] [address]  Tax ID: [id number]  NPI: [id number]  Place of Service: Inpatient [address] [facility] Code: 24     ATTENDING PROVIDER:  Attending Name and NPI#: Katie Crane Md [[id number]]  Address: [address] [place] [address], [address] [place] [address]  Phone: [phone]     UTILIZATION REVIEW CONTACT:  Linda Mendez Utilization   Network Utilization Review Department  Phone: [phone]  Fax: [phone]  Email: [email or web address]     PHYSICIAN ADVISORY SERVICES:  FOR EJOO-OS-YKOF REVIEW - MEDICAL NECESSITY DENIAL  Phone: [phone]  Fax: [phone]  Email: [email or web address]     TYPE OF REQUEST:  Inpatient Status     ADMISSION INFORMATION:  ADMISSION DATE/TIME: 1/13/22  1:30 PM  PATIENT DIAGNOSIS CODE/DESCRIPTION:  Tubulovillous adenoma [D36 9]  DISCHARGE DATE/TIME: 1/17/2022  2:08 PM  DISCHARGE DISPOSITION (IF DISCHARGED): Home/Self Care     IMPORTANT INFORMATION:  Please contact the Linda Mendez directly with any questions or concerns regarding this request  Department voicemails are confidential     Send requests for admission clinical reviews, concurrent reviews, approvals, and administrative denials due to lack of clinical to fax [phone]

## 2022-01-18 NOTE — UTILIZATION REVIEW
Notification of Discharge   This is a Notification of Discharge from our facility [address]  Please be advised that this patient has been discharge from our facility  Below you will find the admission and discharge date and time including the patients disposition  UTILIZATION REVIEW CONTACT:  Desiree Read  Utilization   Network Utilization Review Department  Phone: [phone] x carefully listen to the prompts  All voicemails are confidential   Email: [email or web address]     PHYSICIAN ADVISORY SERVICES:  FOR CKHP-TL-RNAF REVIEW - MEDICAL NECESSITY DENIAL  Phone: [phone]  Fax: [phone]  Email: [email or web address]     PRESENTATION DATE: 1/13/2022  7:34 AM  OBERVATION ADMISSION DATE:   INPATIENT ADMISSION DATE: 1/13/22  1:30 PM   DISCHARGE DATE: 1/17/2022  2:08 PM  DISPOSITION: Home/Self Care Home/Self Care      IMPORTANT INFORMATION:  Send all requests for admission clinical reviews, approved or denied determinations and any other requests to dedicated fax number below belonging to the campus where the patient is receiving treatment   List of dedicated fax numbers:  [address] DENIALS (Administrative/Medical Necessity) [phone]   [address] (Maternity/NICU/Pediatrics) [phone]   Conda Sas [phone]   Jamas Piggs [phone]   [address] [place] [address] [phone]   [address] [phone]   [facility]  [phone]   [address] [place] [address], [address]  2401 [facility] 1000 [facility] [phone]

## 2022-01-19 LAB — METHYLMALONATE SERPL-SCNC: 309 NMOL/L (ref 0–378)

## 2022-01-19 NOTE — UTILIZATION REVIEW
Notification of Discharge   This is a Notification of Discharge from our facility [address]  Please be advised that this patient has been discharge from our facility  Below you will find the admission and discharge date and time including the patients disposition  UTILIZATION REVIEW CONTACT:  Saúl Choi  Utilization   Network Utilization Review Department  Phone: [phone] x carefully listen to the prompts  All voicemails are confidential   Email: [email or web address]     PHYSICIAN ADVISORY SERVICES:  FOR DHSU-RP-XJAD REVIEW - MEDICAL NECESSITY DENIAL  Phone: [phone]  Fax: [phone]  Email: [email or web address]     PRESENTATION DATE: 1/13/2022  7:34 AM  OBERVATION ADMISSION DATE:   INPATIENT ADMISSION DATE: 1/13/22  1:30 PM   DISCHARGE DATE: 1/17/2022  2:08 PM  DISPOSITION: Home/Self Care Home/Self Care      IMPORTANT INFORMATION:  Send all requests for admission clinical reviews, approved or denied determinations and any other requests to dedicated fax number below belonging to the campus where the patient is receiving treatment   List of dedicated fax numbers:  [address] DENIALS (Administrative/Medical Necessity) [phone]   [address] (Maternity/NICU/Pediatrics) [phone]   [facility] [phone]   130 [facility] [phone]   [address] [place] [address] [phone]   [address] [phone]   [facility]  [phone]   [address] [place] [address], [facility]  2401 [facility] 1000 [facility] [phone]

## 2022-01-26 DIAGNOSIS — E83.42 HYPOMAGNESEMIA: Primary | ICD-10-CM

## 2022-01-26 NOTE — TELEPHONE ENCOUNTER
Please advise if refill is appropriate as this was prescribed by a historical provider  33 y.o. male with no stated PMH presenting for congestion. Pt says whenever he drinks alcohol, he feels short of breath and congested. Reports he drank alcohol earlier this morning. Denies fever, chest pain or abdominal pain. intox, NCAT PERRLA EOMI neck supple non tender normal wob cta bl rrr abdomen s nt nd no rebound no guarding WWPx4 neuro non focal.      pt eloped before treatment completion

## 2022-01-31 ENCOUNTER — OFFICE VISIT (OUTPATIENT)
Dept: INTERNAL MEDICINE CLINIC | Facility: CLINIC | Age: 82
End: 2022-01-31

## 2022-01-31 VITALS
OXYGEN SATURATION: 98 % | DIASTOLIC BLOOD PRESSURE: 60 MMHG | WEIGHT: 128.6 LBS | HEART RATE: 78 BPM | SYSTOLIC BLOOD PRESSURE: 155 MMHG | TEMPERATURE: 97.8 F | BODY MASS INDEX: 22.78 KG/M2

## 2022-01-31 DIAGNOSIS — M19.031 PRIMARY OSTEOARTHRITIS OF BOTH WRISTS: ICD-10-CM

## 2022-01-31 DIAGNOSIS — D36.9 TUBULOVILLOUS ADENOMA: Primary | ICD-10-CM

## 2022-01-31 DIAGNOSIS — M16.10 PRIMARY OSTEOARTHRITIS OF HIP, UNSPECIFIED LATERALITY: ICD-10-CM

## 2022-01-31 DIAGNOSIS — I48.20 CHRONIC ATRIAL FIBRILLATION (HCC): ICD-10-CM

## 2022-01-31 DIAGNOSIS — M19.032 PRIMARY OSTEOARTHRITIS OF BOTH WRISTS: ICD-10-CM

## 2022-01-31 DIAGNOSIS — D64.9 ANEMIA, UNSPECIFIED TYPE: ICD-10-CM

## 2022-01-31 PROCEDURE — 99495 TRANSJ CARE MGMT MOD F2F 14D: CPT | Performed by: INTERNAL MEDICINE

## 2022-01-31 RX ORDER — ISOSORBIDE MONONITRATE 30 MG/1
TABLET, EXTENDED RELEASE ORAL
COMMUNITY
Start: 2021-12-17 | End: 2022-05-03

## 2022-01-31 NOTE — PATIENT INSTRUCTIONS
Provider Jacqueline Miller   2/3/2022 5:00 PM [facility] Cardiology [facility] Alexis sylvie Practice-[facility]   2/11/2022 1:30 PM (Arrive by 1:15 PM) MD Marie Burr and [address] [place] [address]   2/28/2022 2:00 PM (Arrive by 1:45 PM) Sabrina Smyth  [address] [facility] 62   5/5/2022 5:00 PM [facility] Cardiology [facility] Alexis Cifuentes [facility]   8/4/2022 5:00 PM [facility] Cardiology [facility]   10/28/2022 10:00 AM (Arrive by 9:45 AM) DEVICE  [address]  [facility] Carlos Alberto Rahman

## 2022-01-31 NOTE — PROGRESS NOTES
ASSESSMENT/PLAN:  Diagnoses and all orders for this visit:    Tubulovillous adenoma  -     S/p lap R hemicolectomy 1/13/2022  - On pathology, specimen was benign and 20 lymph nodes were negative  - Patient has been feeling well with the exception of a decreased appetite since surgery  - She has been drinking ensure and her appetite has been improving slowly  - She is tolerating food well    Primary osteoarthritis of hip, unspecified laterality  -     Ambulatory Referral to Social Work Care Management Program; Future  - Patient requesting VNA  She states that she needs some help with doing laundry and cooking  She has been feeling some fatigue and is unable to complete these tasks  She would also like help going to and from appointments  Chronic atrial fibrillation ([facility] [address] )  -     Ambulatory Referral to Social Work Care Management Program; Future    Anemia, unspecified type  -     CBC and differential; Future  -     Ambulatory Referral to Social Work Care Management Program; Future  - Likely acute blood loss anemia after surgery   - Will repeat CBC in 3 months  Patient is agreeable  B12 deficiency   - She refuses to take oral B12 and wants to do injections only  - Last B12 level >1400  - Reasonable to continue B12 injections every 3 months  Health Maintenance:  Advised diet and exercise  Advised to refrain from tobacco, alcohol, illicit drug use  Advised medical compliance      TCM Call (since 12/31/2021)     Date and time call was made  1/18/2022  1:01 PM    Hospital care reviewed  Records reviewed        Patient was hospitialized at  [facility]        Date of Admission  01/13/22    Date of discharge  01/17/22    Diagnosis  TUBULOVILLOUS ADENOMA - D36 9    Disposition  Home  PT  LIVES ALONE    Were the patients medications reviewed and updated  Yes    Current Symptoms  None      TCM Call (since 12/31/2021)     Post hospital issues  Reduced activity  S/P COLON RECTAL SURGERY    Should patient be enrolled in anticoag monitoring? Yes  ON XARELTO FOR AFIB    Scheduled for follow up? Yes  1/24/22 WITH DR HOLCOMB    Did you obtain your prescribed medications  Yes  USES [facility]    Do you need help managing your prescriptions or medications  No  PT  IS ABLE TO MANAGE HER OWN MEDS    Is transportation to your appointment needed  No  PT  DRIVES     I have advised the patient to call PCP with any new or worsening symptoms  Jeri Kennedy LPN    Living Arrangements  Alone    Are you recieving any outpatient services  No    Are you recieving home care services  No    Are you using any community resources  No    Have you fallen in the last 12 months  No    Interperter language line needed  Yes  [employer]  [id number]    Counseling  Patient  Tasneem Ashley  [id number]    Counseling topics  instructions for management; Importance of RX compliance          Schedule a follow-up appointment in 3 months  CHIEF COMPLAINT: TCM 14 days    HISTORY OF PRESENT ILLNESS:    Patient is an 81 yo f with a PMHx significant for chronic a fib on xarelto, HTN, diverticulosis, and tubulovillous adenoma s/p R hemicolectomy  Patient is coming in for a TCM after hemicolectomy on 1/13/2022  She states that she is feeling well, but her appetite is slightly decreased  She has been drinking ensure and her appetite is picking back up slowly  She is tolerating food well and has been having regular bowel movements  She is scheduled to see CRS on 2/11  Her BP was noted to be 155/60  She stated that she did not take her meds today as she is still on an empty stomach  Patient advised to eat something and take her medications - She is agreeable to this           The following portions of the patient's history were reviewed and updated as appropriate: allergies, current medications, past family history, past medical history, past social history, past surgical history and problem list     12 point system ROS reviewed and negative unless stated otherwise above    OBJECTIVE:  Vitals:    01/31/22 0954   BP: 170/73   BP Location: Left arm   Patient Position: Sitting   Cuff Size: Standard   Pulse: 78   Temp: 97 8 °F (36 6 °C)   TempSrc: Temporal   SpO2: 98%   Weight: 58 3 kg (128 lb 9 6 oz)     Physical Exam  Constitutional:       Appearance: Normal appearance  HENT:      Head: Normocephalic and atraumatic  Nose: Nose normal       Mouth/Throat:      Mouth: Mucous membranes are moist    Eyes:      Extraocular Movements: Extraocular movements intact  Conjunctiva/sclera: Conjunctivae normal    Cardiovascular:      Rate and Rhythm: Normal rate and regular rhythm  Heart sounds: No murmur heard  No friction rub  No gallop  Pulmonary:      Effort: Pulmonary effort is normal  No respiratory distress  Breath sounds: Normal breath sounds  No stridor  No wheezing, rhonchi or rales  Chest:      Chest wall: No tenderness  Abdominal:      General: Bowel sounds are normal  There is no distension  Palpations: Abdomen is soft  There is no mass  Tenderness: There is no abdominal tenderness  There is no guarding or rebound  Hernia: No hernia is present  Comments: Surgical sites clean and intact   Musculoskeletal:      Cervical back: Normal range of motion  Skin:     General: Skin is warm and dry  Neurological:      General: No focal deficit present  Mental Status: She is alert     Psychiatric:         Mood and Affect: Mood normal            Current Outpatient Medications:     Blood Pressure Monitoring (Blood Pressure Cuff) MISC, Use every other day For HTN, Disp: 1 each, Rfl: 0    chlorthalidone 25 mg tablet, TAKE 1 TABLET (25 MG TOTAL) BY MOUTH DAILY, Disp: 90 tablet, Rfl: 2    cyanocobalamin 1,000 mcg/mL, INJECT 1 ML (1,000 MCG TOTAL) INTO A MUSCLE EVERY 30 (THIRTY) DAYS, Disp: 3 mL, Rfl: 10    isosorbide mononitrate (IMDUR) 30 mg 24 hr tablet, SB 1 TABLETA POR VIA ORAL DIARIAMENTE TRUNG NEIDA, Disp: , Rfl:     losartan (COZAAR) 100 MG tablet, Take 1 tablet (100 mg total) by mouth daily, Disp: 90 tablet, Rfl: 0    magnesium oxide (MAG-OX) 400 mg, TAKE 1 TABLET (400 MG TOTAL) BY MOUTH DAILY, Disp: 30 tablet, Rfl: 2    nebivolol (BYSTOLIC) 5 mg tablet, Take 1 tablet (5 mg total) by mouth daily, Disp: 90 tablet, Rfl: 3    polyvinyl alcohol (LIQUIFILM TEARS) 1 4 % ophthalmic solution, Administer 1 drop to the right eye as needed for dry eyes, Disp: 15 mL, Rfl: 0    rivaroxaban (Xarelto) 15 mg tablet, Take 1 tablet (15 mg total) by mouth daily at bedtime, Disp: , Rfl:     rosuvastatin (CRESTOR) 10 MG tablet, Take 1 tablet (10 mg total) by mouth daily, Disp: 90 tablet, Rfl: 0    verapamil (CALAN-SR) 180 mg CR tablet, TAKE 1 TABLET (180 MG TOTAL) BY MOUTH DAILY AT BEDTIME, Disp: 90 tablet, Rfl: 2    Current Facility-Administered Medications:     cyanocobalamin injection 1,000 mcg, 1,000 mcg, Intramuscular, Q30 Days, Lluvia Pierce MD, 1,000 mcg at 12/20/21 1238    Past Medical History:   Diagnosis Date    [facility]     Anemia     Arthritis     Breast pain, right     Chest pain on breathing     Colon polyp     Cough     Diverticulosis     Encounter for screening colonoscopy     H/O sick sinus syndrome     Heart murmur     High cholesterol     History of atrial fibrillation     Rate ontrolled  On xarelto 15mg (creatinine 50) Followed by Dr Zbigniew Murray with Cardiology     History of weight loss     Report loose fitting clothes  Weight stable (3lbs difference)  Last colo showed small tubular adenoma x2  Breast biopsy last showed fibrocystic changes  TSH wnl  Will check cbc, bmp, spep          Hx of long term use of blood thinners     Hypertension     Irregular heart beat     Pacemaker     Preop examination     Shortness of breath     Viral bronchitis      Past Surgical History:   Procedure Laterality Date    BREAST BIOPSY Right 08/17/2006    ultrasound guided Percutaneous Needle Core -Benign    CATARACT EXTRACTION      CATARACT EXTRACTION W/ INTRAOCULAR LENS  IMPLANT, BILATERAL      COLONOSCOPY      COLONOSCOPY N/A 5/22/2018    Procedure: COLONOSCOPY;  Surgeon: Matty Strickland DO;  [facility]; Service: Gastroenterology    Vaibhav Lal / Nicholas Lacey / Sekou Morley Right     as a child after a fall    MAMMO STEREOTACTIC BREAST BIOPSY RIGHT (ALL INC) Right 10/2014    benign    DC CMBND ANTERPOST COLPORRAPHY W/CYSTO N/A 3/17/2016    Procedure: COLPORRHAPHY ANTERIOR POSTERIOR ;  Surgeon: Vee Stanford MD;  [facility];  Service: Gynecology    DC COLONOSCOPY FLX DX W/[facility] WHEN PFRMD N/A 4/4/2019    Procedure: COLONOSCOPY;  Surgeon: Matty Strickland DO;  [facility]; Service: Gastroenterology    DC CYSTOURETHROSCOPY N/A 3/17/2016    Procedure: CYSTOSCOPY;  Surgeon: Vee Stanford MD;  [facility];  Service: Gynecology    DC ESOPHAGOGASTRODUODENOSCOPY TRANSORAL DIAGNOSTIC N/A 4/16/2018    Procedure: EGD AND COLONOSCOPY;  Surgeon: Matty Strickland DO;  [facility]; Service: Gastroenterology    DC LAP,SURG,COLECTOMY, PARTIAL, W/ANAST Right 1/13/2022    Procedure: Diagnostic laparoscopy, laparscopic right colectomy;  Surgeon: Deepti James MD;  [facility];  Service: Colorectal    DC REVAGINAL PROLAPSE,SACROSP LIG N/A 3/17/2016    Procedure: COLPOPEXY VAGINAL EXTRAPERITONEAL (VEC) ANTERIOR ;  Surgeon: Vee Stanford MD;  [facility];  Service: Gynecology    DC SLING OPER STRES INCONTINENCE N/A 3/17/2016    Procedure: INSERTION PUBOVAGINAL SLING SINGLE INCISION ;  Surgeon: Vee Stanford MD;  [facility];  Service: Gynecology     Social History     Socioeconomic History    Marital status:       Spouse name: Not on file    Number of children: Not on file    Years of education: Not on file    Highest education level: Not on file   Occupational History    Occupation: retired   Tobacco Use    Smoking status: Never Smoker    Smokeless tobacco: Never Used   Vaping Use    Vaping Use: Never used   Substance and Sexual Activity    Alcohol use: Not Currently    Drug use: No    Sexual activity: Not Currently     Comment:    Other Topics Concern    Not on file   Social History Narrative    Housing, household, and economic circumstances      Social Determinants of Health     Financial Resource Strain: Medium Risk    Difficulty of Paying Living Expenses: Somewhat hard   Food Insecurity: No Food Insecurity    Worried About Running Out of Food in the Last Year: Never true    Doris of Food in the Last Year: Never true   Transportation Needs: No Transportation Needs    Lack of Transportation (Medical): No    Lack of Transportation (Non-Medical): No   Physical Activity: Inactive    Days of Exercise per Week: 0 days    Minutes of Exercise per Session: 0 min   Stress: No Stress Concern Present    Feeling of Stress : Not at all   Social Connections: Moderately Isolated    Frequency of Communication with Friends and Family: More than three times a week    Frequency of Social Gatherings with Friends and Family: More than three times a week    Attends [employer] Services: More than 4 times per year    Active Member of Clubs or Organizations: No    Attends Club or Organization Meetings: Never    Marital Status:     Intimate Partner Violence: Not At Risk    Fear of Current or Ex-Partner: No    Emotionally Abused: No    Physically Abused: No    Sexually Abused: No   Housing Stability: Not on file     Family History   Problem Relation Age of Onset    Diabetes Mother     Breast cancer Sister 48    No Known Problems Father     No Known Problems Maternal Grandmother     No Known Problems Maternal Grandfather     No Known Problems Paternal Grandmother     No Known Problems Paternal Grandfather     No Known Problems Daughter     No Known Problems Son  No Known Problems Sister     No Known Problems Sister     No Known Problems Brother     No Known Problems Brother     No Known Problems Sister     No Known Problems Paternal Aunt     No Known Problems Paternal Aunt     No Known Problems Paternal Aunt     No Known Problems Paternal Aunt        ==  Damion Shelton MD  [facility] Internal Medicine PGY-3    [address]   [facility] , [address] [address] [facility] [facility]  Office: [phone]  Fax: [phone]

## 2022-02-03 ENCOUNTER — REMOTE DEVICE CLINIC VISIT (OUTPATIENT)
Dept: CARDIOLOGY CLINIC | Facility: CLINIC | Age: 82
End: 2022-02-03
Payer: MEDICARE

## 2022-02-03 ENCOUNTER — PATIENT OUTREACH (OUTPATIENT)
Dept: INTERNAL MEDICINE CLINIC | Facility: CLINIC | Age: 82
End: 2022-02-03

## 2022-02-03 DIAGNOSIS — Z95.0 CARDIAC PACEMAKER IN SITU: Primary | ICD-10-CM

## 2022-02-03 PROCEDURE — 93296 REM INTERROG EVL PM/IDS: CPT | Performed by: INTERNAL MEDICINE

## 2022-02-03 PROCEDURE — 93294 REM INTERROG EVL PM/LDLS PM: CPT | Performed by: INTERNAL MEDICINE

## 2022-02-03 NOTE — PROGRESS NOTES
Results for orders placed or performed in visit on 02/03/22   Cardiac EP device report    Narrative    MDT-SINGLE CHAMBER PPM / NOT MRI CONDITIONAL  CARELINK TRANSMISSION: BATTERY STATUS "10 YRS "  72%  ALL AVAILABLE LEAD PARAMETERS WITHIN NORMAL LIMITS  NO SIGNIFICANT HIGH RATE EPISODES  NORMAL DEVICE FUNCTION   NC         Current Outpatient Medications:     Blood Pressure Monitoring (Blood Pressure Cuff) MISC, Use every other day For HTN, Disp: 1 each, Rfl: 0    chlorthalidone 25 mg tablet, TAKE 1 TABLET (25 MG TOTAL) BY MOUTH DAILY, Disp: 90 tablet, Rfl: 2    cyanocobalamin 1,000 mcg/mL, INJECT 1 ML (1,000 MCG TOTAL) INTO A MUSCLE EVERY 30 (THIRTY) DAYS, Disp: 3 mL, Rfl: 10    isosorbide mononitrate (IMDUR) 30 mg 24 hr tablet, SB 1 TABLETA POR VIA ORAL DIARIAMENTE TRUNG INDICADO, Disp: , Rfl:     losartan (COZAAR) 100 MG tablet, Take 1 tablet (100 mg total) by mouth daily, Disp: 90 tablet, Rfl: 0    magnesium oxide (MAG-OX) 400 mg, TAKE 1 TABLET (400 MG TOTAL) BY MOUTH DAILY, Disp: 30 tablet, Rfl: 2    nebivolol (BYSTOLIC) 5 mg tablet, Take 1 tablet (5 mg total) by mouth daily, Disp: 90 tablet, Rfl: 3    polyvinyl alcohol (LIQUIFILM TEARS) 1 4 % ophthalmic solution, Administer 1 drop to the right eye as needed for dry eyes, Disp: 15 mL, Rfl: 0    rivaroxaban (Xarelto) 15 mg tablet, Take 1 tablet (15 mg total) by mouth daily at bedtime, Disp: , Rfl:     rosuvastatin (CRESTOR) 10 MG tablet, Take 1 tablet (10 mg total) by mouth daily, Disp: 90 tablet, Rfl: 0    verapamil (CALAN-SR) 180 mg CR tablet, TAKE 1 TABLET (180 MG TOTAL) BY MOUTH DAILY AT BEDTIME, Disp: 90 tablet, Rfl: 2    Current Facility-Administered Medications:     cyanocobalamin injection 1,000 mcg, 1,000 mcg, Intramuscular, Q30 Days, Ebb Goldmann, MD, 1,000 mcg at 12/20/21 7238

## 2022-02-03 NOTE — PROGRESS NOTES
SW has called pt with the assistance of Favian Keith who assisted with translation  Pt is asking for help to get her home attended services back  Pt had been approved for the [place] program for 60 hours/ week  But at the End the Year they drastically cut her services back and she does not know why  Pt refers to another program program she maybe on   Pt shares she is not able to cook or do any chores  She is using microwave meals  Her son has helped with shopping at this time  SW to f/u and assist as indicted

## 2022-02-04 ENCOUNTER — PATIENT OUTREACH (OUTPATIENT)
Dept: INTERNAL MEDICINE CLINIC | Facility: CLINIC | Age: 82
End: 2022-02-04

## 2022-02-04 NOTE — PROGRESS NOTES
JULIOCESAR spoke with [employer] [phone] who gave SW # for pt's Care Coordinator and Supervisor  for her [address] [place] [address] [phone] & Supervisor: Mesha Martinez [phone]  JULIOCESAR attempted to reach her [address] [place] [address] at the provided # but the # is not in service  Juliocesar did leave a message on her Supervisor Xavier De La Rosa # [phone] and requested a call back

## 2022-02-04 NOTE — PROGRESS NOTES
CHW help [address] [employer] with Chadian translation  Pt is asking help with home care  Pt states that something changed since she has a new insurance and now she is getting 3 days of home care for 1 to 2 hours a day  Pt had a surgery recently and feels that she does not have the help and support she needs on home care  Pt states her  is Almeda Claude and she has been trying to reach her for the last 2 months and she is not available  Please see JULIOCESAR Mccall notes to get more information

## 2022-02-07 ENCOUNTER — PATIENT OUTREACH (OUTPATIENT)
Dept: INTERNAL MEDICINE CLINIC | Facility: CLINIC | Age: 82
End: 2022-02-07

## 2022-02-07 DIAGNOSIS — Z78.9 NEEDS ASSISTANCE WITH COMMUNITY RESOURCES: Primary | ICD-10-CM

## 2022-02-07 NOTE — PROGRESS NOTES
CHW along with [employer] call Snehal Pacheco [phone] with [employer]  Per Maureen Echavarria patient had an assessment in June 2021 for Waiver and it was denied because patient was NOT found to be nursing home eligible  Patient scored independent and self sufficient in all areas  She is able to walk, transfer, clean herself, eat on her own, toilet transfer  She has been denied Waiver Services  Per Maureen Echavarria  Pt will have another re-assessmenton Thursday 02/10/2022 over the phone at [facility] Fax # is [phone]-  she needs the following information prior to the assessment  DX, List of Medications, [address] [place] [address] Avenue / [place] and a letter written from the doctors that indicates patient needs personal care assistance  CHW along with [employer] call patient to give her the information from Snehal Jimenez guided her as to what information Waiver is looking for  She stated she now understand the information and will be ready for her intake apt on Thursday 02/10/2022

## 2022-02-07 NOTE — PROGRESS NOTES
JULIOCESAR called [employer] Supervisor [phone] at [address] [place] [address] and requested a return call again  SW later received a return call from [address]  SW still could not reach her on Office Phone but SW was able to reach her via a cell phone  She will f/u with her IT people re same as pt also could not reach her  Ms Jimmie Bland does share pt has julia contacted previosly several times and she had scored independent and had not qualified for aides care because pt has said she was independent is her ADLs  She has refused MOW's as well and she had refused to do a re-assessment  Ms Jimmie Bland further relates she spoke with pt also on Friday 2/4/22 and pt has agreed to a Re-Assessment for this Thursday 2/10/22 via the PHONE @ 9:30 AM     She also provided a Participant Help # [phone] if she can not reach Ms Jimmie Bland again  Ms Jimmie Bland relates that if pt agrees it maybe helpful to see her any updated medical info , medications , recent visits and a Physician's letter indicating pt's medical needs and need for hands on care  [facility] to [facility] @ Fax # [phone]  JULIOCESAR and Lela called pt to review with pt the Re- Evaluation process  Pt is receptive  Pt has given permission for CM to forward her H&P , Medication list and PCP letter in support of this referral      [address] plans to be with pt for pt's re-evaluation appointment later this week

## 2022-02-11 ENCOUNTER — PATIENT OUTREACH (OUTPATIENT)
Dept: INTERNAL MEDICINE CLINIC | Facility: CLINIC | Age: 82
End: 2022-02-11

## 2022-02-11 NOTE — PROGRESS NOTES
[facility] Hours:   CHW spoke to Sandip Ty [phone]  for [id number] to see how the Intake interview had gone for patient  She stated she has submitted the information and needs our documents letter from doctor, H&P and list of medications to upload to her application  The information has to be review and they will make the decision on how many hours she gets approved  CHW spoke to patient today and she stated she missed doctors apt yesterday because the program that she schedules rides did not pick her up  Send in basket message to the doctors to see if they can write the letter by Monday or Tuesday to send to the        CHW will continue to work with patient and communicate w/ team

## 2022-02-14 ENCOUNTER — TELEPHONE (OUTPATIENT)
Dept: INTERNAL MEDICINE CLINIC | Facility: CLINIC | Age: 82
End: 2022-02-14

## 2022-02-14 NOTE — TELEPHONE ENCOUNTER
----- Message from Boubacar Guidry sent at 9/24/6219  4:13 PM EST -----  Regarding: Shower Chair  Patient is requesting a Shower Chair  Are you able to related message to her doctors please  Thank you       Coleman Childress

## 2022-02-16 DIAGNOSIS — M16.10 PRIMARY OSTEOARTHRITIS OF HIP, UNSPECIFIED LATERALITY: Primary | ICD-10-CM

## 2022-02-16 NOTE — TELEPHONE ENCOUNTER
Form is compete and ready to be submitted  Thank you      Allen Tubbs MD   PGY-1 Internal Medicine  [facility]

## 2022-02-17 ENCOUNTER — PATIENT OUTREACH (OUTPATIENT)
Dept: INTERNAL MEDICINE CLINIC | Facility: CLINIC | Age: 82
End: 2022-02-17

## 2022-02-17 NOTE — PROGRESS NOTES
Documents Faxed to Sandip Ty at [phone]  H & P and List of Meds    Waiting from the letter from the doctor

## 2022-02-18 ENCOUNTER — PATIENT OUTREACH (OUTPATIENT)
Dept: INTERNAL MEDICINE CLINIC | Facility: CLINIC | Age: 82
End: 2022-02-18

## 2022-02-18 NOTE — PROGRESS NOTES
CHW received call from patient and she states the [employer] have been approved and begin having home health services on 02/28/2022  She has been approved for 30 hours a week and the person from the agency will begin on 02/28/2022  The person is from [facility]  and it will be a [employer] speaking person  Patient is very thankful for the help she received from our clinic  Case will be closed and CHW will be in communication with team      Addendum:   Received call from Tito Romberg at [phone]  And she confirm all the information patient had given me earlier  Name: Olena Gilmore   : 2005  MRN: [id number]  Date of Visit: 2020    Presenting Information: Olena was seen for a return evaluation regarding her history of primary ovarian failure and tall stature. Olena was accompanied by her parents, Mynor and Katt, today. I met with the Olena per the request of Dr. Mackey to discuss exome sequencing with the family.    We spent time reviewing Olena's history, and that previous testing (karyotype, Fragile X analysis, and ovarian dysgenesis gene panel) was not able to provide a specific diagnosis or explanation for Olena's medical history.  We reviewed that based on the genetic testing results up to this point in time, we are not able to offer specific testing for a known condition for Olena.  However, we discussed broader testing through Exome Sequencing to look for a possible underlying cause for Olena's symptoms.    This case was co-counseled by Dasha Luevano, Genetic Counseling Intern.    Discussion:  During the appointment we discussed the option of ALMA DELIA to help clarify a genetic diagnosis for Olena. This is the next, most appropriate line of testing to pursue in her case.  ALMA DELIA looks at the exome or the coding parts of the genes to look for gene changes that may explain Olena's symptoms. This may help identify a pathogenic variant that is responsible for the patient's features. Establishing a genetic diagnosis can help to ensure proper and continued care for the patient.    The patient's features are strongly suspicious for an underlying unifying diagnosis. We recommend ALMA DELIA as the most appropriate, cost-effective approach to testing as the patient's features can overlap many possible genetic conditions that cannot be clinically distinguished from one another. Her diagnosis cannot be determined by review of medical history, previous labs, or physical examination alone. ALMA DELIA allows for the simultaneous detection of many conditions in a single test  and may be cheaper than sequential testing of individual genes. The clinical utility of the ALMA DELIA has been well demonstrated.     Exome Sequencing Results:   We reviewed that there are three types of results that can be obtained from ALMA DELIA:    One possibility is a change(s) could be seen in Olena and this change(s) is known to cause similar symptoms to the symptoms Olena has experienced.  This is considered a positive result.  A positive result may provide more information on appropriate clinical management for Olena and may provide information on additional potential health risks associated with Olena's diagnosis.  A positive result can also have implications for the health and reproductive risks of other relatives.    It is also possible that no change(s) that are likely to explain Olena's symptoms are found from ALMA DELIA.  This is considered a negative result.  A negative result would not completely rule out a possible genetic cause for Olena's symptoms.    Not all changes in our genes cause disease.  Sometimes, it can be difficult for the laboratory to determine whether or not a change that is found contributes to the patient's symptoms.  If the meaning of a particular gene change is unknown, the lab classifies the result as a variant of unknown significance.      Benefits/Limitations:    Benefits: We discussed the potential benefits and limitations of this testing. Benefits may include the identification of a specific cause of Olena's history and the potential opportunity to be proactive in her future care if a specific condition is identified. This information can be used fro family planning purposes. If a diagnosis is made a more specific recurrence risk will be provided. This will allow the patient to make informed reproductive decisions. In addition, testing for other family members to assess their chance to have the condition or be a carrier for the condition would be possible if this testing identifies a  specific genetic cause.     Limitations:   o The limitations of the technology used for ALMA DELIA and the inability to find certain mutation types, as well as the inability to sequence all parts of the genome.     o Roughly 25% of patients have a diagnostic finding, which may be due to limitations in technology/genetic knowledge (diagnostic yield varies between labs and clinical indication).   o In addition, the entire exome may not be well covered, and the intronic regions (non-coding) are not included in this test.   o There are also conditions that are not detected through this technology including copy number variants (deletions/duplications), trinucleotide repeat disorders, disorders caused by methylation errors, and disorders with pseudogenes.   o The chance that even a known diagnosis may not offer complete information about disease progression or prognosis. The reality that identifying a diagnosis may not offer additional treatment or management options.   o Risks: The possibility of unexpected results not related to the original reason for ordering the test.    Parental Samples:  We discussed that samples from Olena's parents will be included in the analysis to help determine if gene changes that are found are disease causing or benign.  Only changes that are found in Olena that may contributed to his/her symptoms will be tested for in his/her parents and only gene changes that the laboratory believes may contribute to Olena's symptoms will be reported.   We further reviewed that genetic testing in parents can reveal family relationships, including paternity.  The couple expressed understanding and wished to receive the results of any testing performed in them to explain Olena's results.  Changes and variants in genes that are not thought to contribute to Olena's symptoms will not be included in the results report and will not be tested for in his/her parents.     [facility] Secondary Findings:  We reviewed that  the lab can report the results of gene mutations that are found in genes recommended by the American College of Medical Genetics and Genomics (ACMG) to be reported to ALMA DELIA patients even if the gene mutation does not contribute to their current symptoms.  Many of these gene changes may not be associated with symptoms until adulthood and are not traditionally tested for in children, but may lead to medical management changes. Examples include genes related to increased cancer risk and heart arrhythmias. In addition, parental carrier status for a change in one of the secondary findings gene may be able to be inferred from Olena's results.  At this time, the family decided to receive the results from the ACMG secondary findings.     Insurance Coverage for Exome Sequencing  We reviewed the potential costs of ALMA DELIA and discussed that the lab will look into the costs of testing through the family's insurance on their behalf.  We reviewed that they will be contacted by the laboratory with the expected out-of-pocket cost, but they should also check this information with their insurance company. This estimation is not guaranteed. The family has the right to decline to proceed with testing based on the out-of-pocket cost.  If the out-of-pocket cost of testing is <$100, the family will not be contacted and testing will be initiated.     Genetic Information Nondiscrimination Act (NORMAN):   We reviewed protections and limiations of NORMAN. A informational handout on NORMAN (from: http://www.ginahelp.org/)    We discussed that there are insurance implications related to these findings in terms of life, short term disability, and long term disability insurance. There is a federal law in place at the moment, The Genetic Information Nondiscrimination Act or NORMAN (2008) that protects again health insurance discrimination. Health insurance protections do not apply to:     Members ?of ?the ?[place] ? ?who ?receive ?their ?care ?through? the  "??  ?Health ?  System    Veterans ?who ?receive ?their ?care ?through? the ?'s ?Administration     The? [place]? Health? Service     Federal ?employees ?who ?get ?care ?through ?the ?Federal ?Employees ?Health ?Benefits ?Plans     Employers may not discriminate (hiring, firing, promotions etc.), based on genetic information. This only applies to companies with 15 or more employees. It does not apply to federal employees, or , which have their own nondiscrimination protections in place. Employers may have \"voluntary\" health services such as employee wellness programs that request genetic information or family history, which is not a violation of NORMAN.       At this time, we are completing this testing through [facility]. [facility] has a program to inform family members of the cost of testing prior to billing insurance directly. Therefore, insurance information was provided to [facility] to complete this evaluation, and they will contact the family prior to proceeding with this testing.     Consent was obtained and blood was drawn and sent to [facility] at the time of the appointment for our proband (Olena) and  the other two family members who are: Olena's mother (Katt Gilmore) and father (Mynor Gilmore). Olena's parents also opted to receive incidental findings pertaining to the 57 genes identified by the American College of Medical Genetics (ACMG). A return visit for genetic counseling for Olena's result and incidental findings is recommended once results return.      Plan:  1. The purpose and process of whole exome sequencing (ALMA DELIA) was reviewed today.  2. After reviewing the risks, benefits, and limitations of genetic testing, consent was obtained for ExomeNext (ALMA DELIA) for Olena.  In addition, both Aleshia's parents consented to providing blood samples to assist in the interpretation of Olena's results.  They are aware that they will not be charged for the parental studies; " however, a bill may be received for the blood draw to obtain the sample today.  3. A copy of the consent form was provided to the family  4. Results are expected in 8-16 weeks. These will be disclosed over the phone, and a follow up appointment will be made to discuss results in further detail.  5. Contact information was provided to the family.  Additional questions or concerns were denied.      Karlee Peralta MS, [facility]  Licensed & Certified Genetic Counselor   [facility]  Phone: [phone]  Fax: [phone]    Approximate Time Spent in Consultation: 60 minutes     CC: No Letter

## 2022-02-18 NOTE — PROGRESS NOTES
SW attempted to return call to [facility] X3 and her phone still does not connect  Sw did leave a mesaage with their general # that SW has attempted to return her call  Mis has received update for our CHW that pt has been approved for increased Waiver Service  Hours  ( 30 hours)  which will begin 2/28/22  Te service is from [employer],  Pt pleased with same  Please re-consult MIS if needed

## 2022-02-28 ENCOUNTER — OFFICE VISIT (OUTPATIENT)
Dept: INTERNAL MEDICINE CLINIC | Facility: CLINIC | Age: 82
End: 2022-02-28

## 2022-02-28 VITALS
TEMPERATURE: 97.6 F | WEIGHT: 133.4 LBS | SYSTOLIC BLOOD PRESSURE: 136 MMHG | BODY MASS INDEX: 23.63 KG/M2 | DIASTOLIC BLOOD PRESSURE: 71 MMHG | HEART RATE: 65 BPM

## 2022-02-28 DIAGNOSIS — E53.8 B12 DEFICIENCY: Primary | ICD-10-CM

## 2022-02-28 DIAGNOSIS — I10 ESSENTIAL HYPERTENSION: ICD-10-CM

## 2022-02-28 PROCEDURE — 3078F DIAST BP <80 MM HG: CPT | Performed by: INTERNAL MEDICINE

## 2022-02-28 PROCEDURE — 3075F SYST BP GE 130 - 139MM HG: CPT | Performed by: INTERNAL MEDICINE

## 2022-02-28 PROCEDURE — G0439 PPPS, SUBSEQ VISIT: HCPCS | Performed by: INTERNAL MEDICINE

## 2022-02-28 NOTE — PATIENT INSTRUCTIONS
Please keep a log of your blood pressure at home and bring it to your next appointment  Please follow up with a nutritionist      Medicare Preventive Visit Patient Instructions  Thank you for completing your Welcome to Medicare Visit or Medicare Annual Wellness Visit today  Your next wellness visit will be due in one year (3/1/2023)  The screening/preventive services that you may require over the next 5-10 years are detailed below  Some tests may not apply to you based off risk factors and/or age  Screening tests ordered at today's visit but not completed yet may show as past due  Also, please note that scanned in results may not display below  Preventive Screenings:  Service Recommendations Previous Testing/Comments   Colorectal Cancer Screening  * Colonoscopy    * Fecal Occult Blood Test (FOBT)/Fecal Immunochemical Test (FIT)  * Fecal DNA/Cologuard Test  * Flexible Sigmoidoscopy Age: 54-65 years old   Colonoscopy: every 10 years (may be performed more frequently if at higher risk)  OR  FOBT/FIT: every 1 year  OR  Cologuard: every 3 years  OR  Sigmoidoscopy: every 5 years  Screening may be recommended earlier than age 48 if at higher risk for colorectal cancer  Also, an individualized decision between you and your healthcare provider will decide whether screening between the ages of 74-80 would be appropriate  Colonoscopy: 09/20/2021  FOBT/FIT: Not on file  Cologuard: Not on file  Sigmoidoscopy: Not on file    Screening Current     Breast Cancer Screening Age: 36 years old  Frequency: every 1-2 years  Not required if history of left and right mastectomy Mammogram: 01/27/2021    Screening Current   Cervical Cancer Screening Between the ages of 21-29, pap smear recommended once every 3 years  Between the ages of 33-67, can perform pap smear with HPV co-testing every 5 years     Recommendations may differ for women with a history of total hysterectomy, cervical cancer, or abnormal pap smears in past  Pap Smear: Not on file    Screening Not Indicated   Hepatitis C Screening Once for adults born between 1945 and 1965  More frequently in patients at high risk for Hepatitis C Hep C Antibody: Not on file        Diabetes Screening 1-2 times per year if you're at risk for diabetes or have pre-diabetes Fasting glucose: 89 mg/dL   A1C: 5 4 %    Screening Current   Cholesterol Screening Once every 5 years if you don't have a lipid disorder  May order more often based on risk factors  Lipid panel: 08/16/2021    Screening Not Indicated  History Lipid Disorder     Other Preventive Screenings Covered by Medicare:  1  Abdominal Aortic Aneurysm (AAA) Screening: covered once if your at risk  You're considered to be at risk if you have a family history of AAA  2  Lung Cancer Screening: covers low dose CT scan once per year if you meet all of the following conditions: (1) Age 50-69; (2) No signs or symptoms of lung cancer; (3) Current smoker or have quit smoking within the last 15 years; (4) You have a tobacco smoking history of at least 30 pack years (packs per day multiplied by number of years you smoked); (5) You get a written order from a healthcare provider  3  Glaucoma Screening: covered annually if you're considered high risk: (1) You have diabetes OR (2) Family history of glaucoma OR (3)  aged 48 and older OR (3)  American aged 72 and older  3  Osteoporosis Screening: covered every 2 years if you meet one of the following conditions: (1) You're estrogen deficient and at risk for osteoporosis based off medical history and other findings; (2) Have a vertebral abnormality; (3) On glucocorticoid therapy for more than 3 months; (4) Have primary hyperparathyroidism; (5) On osteoporosis medications and need to assess response to drug therapy  · Last bone density test (DXA Scan): 12/21/2016   5  HIV Screening: covered annually if you're between the age of 15-65   Also covered annually if you are younger than 13 and older than 72 with risk factors for HIV infection  For pregnant patients, it is covered up to 3 times per pregnancy  Immunizations:  Immunization Recommendations   Influenza Vaccine Annual influenza vaccination during flu season is recommended for all persons aged >= 6 months who do not have contraindications   Pneumococcal Vaccine (Prevnar and Pneumovax)  * Prevnar = PCV13  * Pneumovax = PPSV23   Adults 25-60 years old: 1-3 doses may be recommended based on certain risk factors  Adults 72 years old: Prevnar (PCV13) vaccine recommended followed by Pneumovax (PPSV23) vaccine  If already received PPSV23 since turning 65, then PCV13 recommended at least one year after PPSV23 dose  Hepatitis B Vaccine 3 dose series if at intermediate or high risk (ex: diabetes, end stage renal disease, liver disease)   Tetanus (Td) Vaccine - COST NOT COVERED BY MEDICARE PART B Following completion of primary series, a booster dose should be given every 10 years to maintain immunity against tetanus  Td may also be given as tetanus wound prophylaxis  Tdap Vaccine - COST NOT COVERED BY MEDICARE PART B Recommended at least once for all adults  For pregnant patients, recommended with each pregnancy  Shingles Vaccine (Shingrix) - COST NOT COVERED BY MEDICARE PART B  2 shot series recommended in those aged 48 and above     Health Maintenance Due:      Topic Date Due    Colorectal Cancer Screening  09/20/2022     Immunizations Due:      Topic Date Due    COVID-19 Vaccine (3 - Booster for Danetta Alba series) 10/27/2021     Advance Directives   What are advance directives? Advance directives are legal documents that state your wishes and plans for medical care  These plans are made ahead of time in case you lose your ability to make decisions for yourself  Advance directives can apply to any medical decision, such as the treatments you want, and if you want to donate organs  What are the types of advance directives?   There are many types of advance directives, and each state has rules about how to use them  You may choose a combination of any of the following:  · Living will: This is a written record of the treatment you want  You can also choose which treatments you do not want, which to limit, and which to stop at a certain time  This includes surgery, medicine, IV fluid, and tube feedings  · Durable power of  for healthcare [facility]): This is a written record that states who you want to make healthcare choices for you when you are unable to make them for yourself  This person, called a proxy, is usually a family member or a friend  You may choose more than 1 proxy  · Do not resuscitate (DNR) order:  A DNR order is used in case your heart stops beating or you stop breathing  It is a request not to have certain forms of treatment, such as CPR  A DNR order may be included in other types of advance directives  · Medical directive: This covers the care that you want if you are in a coma, near death, or unable to make decisions for yourself  You can list the treatments you want for each condition  Treatment may include pain medicine, surgery, blood transfusions, dialysis, IV or tube feedings, and a ventilator (breathing machine)  · Values history: This document has questions about your views, beliefs, and how you feel and think about life  This information can help others choose the care that you would choose  Why are advance directives important? An advance directive helps you control your care  Although spoken wishes may be used, it is better to have your wishes written down  Spoken wishes can be misunderstood, or not followed  Treatments may be given even if you do not want them  An advance directive may make it easier for your family to make difficult choices about your care        © Copyright [employer] 2018 Information is for End User's use only and may not be sold, redistributed or otherwise used for commercial purposes   All illustrations and images included in CareNotes® are the copyrighted property of A D A M , Inc  or Alexandra Diaz

## 2022-02-28 NOTE — PROGRESS NOTES
Assessment and Plan:     Problem List Items Addressed This Visit     None           Preventive health issues were discussed with patient, and age appropriate screening tests were ordered as noted in patient's After Visit Summary  Personalized health advice and appropriate referrals for health education or preventive services given if needed, as noted in patient's After Visit Summary  History of Present Illness:     Patient presents for Welcome to Medicare visit  Patient Care Team:  Tessa Starks MD as PCP - General (Internal Medicine)  Richard Azevedo DO as Endoscopist     Review of Systems:     Review of Systems   Problem List:     Patient Active Problem List   Diagnosis    Essential hypertension    Hyperlipidemia LDL goal <100    Stage 3 chronic kidney disease ([facility] )    Osteoarthritis of both wrists    Chronic atrial fibrillation (HCC)    Tubulovillous adenoma    Gastroesophageal reflux disease without esophagitis    Presence of cardiac pacemaker    B12 deficiency    Allergic rhinitis due to pollen    Cavus deformity of foot    Midline cystocele    Hallux abducto valgus, bilateral    Left renal artery stenosis ([facility] )    Osteoarthritis of hip    Osteopenia    Pain due to onychomycosis of toenails of both feet    Left atrial enlargement    Lipoma of right upper extremity    Diverticulosis of colon    Abnormal nuclear stress test    Renal insufficiency      Past Medical and Surgical History:     Past Medical History:   Diagnosis Date    A-fib ([facility] )     Anemia     Arthritis     Breast pain, right     Chest pain on breathing     Colon polyp     Cough     Diverticulosis     Encounter for screening colonoscopy     H/O sick sinus syndrome     Heart murmur     High cholesterol     History of atrial fibrillation     Rate ontrolled  On xarelto 15mg (creatinine 50) Followed by Dr Cecelia Neves with Cardiology     History of weight loss     Report loose fitting clothes   Weight stable (3lbs difference)  Last colo showed small tubular adenoma x2  Breast biopsy last showed fibrocystic changes  TSH wnl  Will check cbc, bmp, spep   Hx of long term use of blood thinners     Hypertension     Irregular heart beat     Pacemaker     Preop examination     Shortness of breath     Viral bronchitis      Past Surgical History:   Procedure Laterality Date    BREAST BIOPSY Right 08/17/2006    ultrasound guided Percutaneous Needle Core -Benign    CATARACT EXTRACTION      CATARACT EXTRACTION W/ INTRAOCULAR LENS  IMPLANT, BILATERAL      COLONOSCOPY      COLONOSCOPY N/A 5/22/2018    Procedure: COLONOSCOPY;  Surgeon: Jorje Sanchez DO;  [facility]; Service: Gastroenterology    Alonso Green / George Nielson / Aiyana Frazier Right     as a child after a fall    MAMMO STEREOTACTIC BREAST BIOPSY RIGHT (ALL INC) Right 10/2014    benign    ND CMBND ANTERPOST COLPORRAPHY W/CYSTO N/A 3/17/2016    Procedure: COLPORRHAPHY ANTERIOR POSTERIOR ;  Surgeon: Orie Sicard, MD;  [facility];  Service: Gynecology    ND COLONOSCOPY FLX DX W/[facility] WHEN PFRMD N/A 4/4/2019    Procedure: COLONOSCOPY;  Surgeon: Jorje Sanchez DO;  [facility]; Service: Gastroenterology    ND CYSTOURETHROSCOPY N/A 3/17/2016    Procedure: CYSTOSCOPY;  Surgeon: Orie Sicard, MD;  [facility];  Service: Gynecology    ND ESOPHAGOGASTRODUODENOSCOPY TRANSORAL DIAGNOSTIC N/A 4/16/2018    Procedure: EGD AND COLONOSCOPY;  Surgeon: Jorje Sanchez DO;  [facility];   Service: Gastroenterology    ND LAP,SURG,COLECTOMY, PARTIAL, W/ANAST Right 1/13/2022    Procedure: Diagnostic laparoscopy, laparscopic right colectomy;  Surgeon: Monica Ramos MD;  [facility];  Service: Colorectal    ND REVAGINAL PROLAPSE,SACROSP LIG N/A 3/17/2016    Procedure: COLPOPEXY VAGINAL EXTRAPERITONEAL (VEC) ANTERIOR ;  Surgeon: Orie Sicard, MD;  [facility];  Service: Gynecology    DC SLING OPER STRES INCONTINENCE N/A 3/17/2016    Procedure: INSERTION PUBOVAGINAL SLING SINGLE INCISION ;  Surgeon: Kj Hook MD;  [facility];  Service: Gynecology      Family History:     Family History   Problem Relation Age of Onset    Diabetes Mother     Breast cancer Sister 48    No Known Problems Father     No Known Problems Maternal Grandmother     No Known Problems Maternal Grandfather     No Known Problems Paternal Grandmother     No Known Problems Paternal Grandfather     No Known Problems Daughter     No Known Problems Son     No Known Problems Sister     No Known Problems Sister     No Known Problems Brother     No Known Problems Brother     No Known Problems Sister     No Known Problems Paternal Aunt     No Known Problems Paternal Aunt     No Known Problems Paternal Aunt     No Known Problems Paternal Aunt       Social History:     Social History     Socioeconomic History    Marital status:      Spouse name: Not on file    Number of children: Not on file    Years of education: Not on file    Highest education level: Not on file   Occupational History    Occupation: retired   Tobacco Use    Smoking status: Never Smoker    Smokeless tobacco: Never Used   Vaping Use    Vaping Use: Never used   Substance and Sexual Activity    Alcohol use: Not Currently    Drug use: No    Sexual activity: Not Currently     Comment:    Other Topics Concern    Not on file   Social History Narrative    Housing, household, and economic circumstances      Social Determinants of Health     Financial Resource Strain: Medium Risk    Difficulty of Paying Living Expenses: Somewhat hard   Food Insecurity: No Food Insecurity    Worried About Running Out of Food in the Last Year: Never true    Doris of Food in the Last Year: Never true   Transportation Needs: No Transportation Needs    Lack of Transportation (Medical):  No    Lack of Transportation (Non-Medical): No   Physical Activity: Inactive    Days of Exercise per Week: 0 days    Minutes of Exercise per Session: 0 min   Stress: No Stress Concern Present    Feeling of Stress : Not at all   Social Connections: Moderately Isolated    Frequency of Communication with Friends and Family: More than three times a week    Frequency of Social Gatherings with Friends and Family: More than three times a week    Attends Hoahaoism Services: More than 4 times per year    Active Member of Clubs or Organizations: No    Attends Club or Organization Meetings: Never    Marital Status:     Intimate Partner Violence: Not At Risk    Fear of Current or Ex-Partner: No    Emotionally Abused: No    Physically Abused: No    Sexually Abused: No   Housing Stability: Not on file      Medications and Allergies:     Current Outpatient Medications   Medication Sig Dispense Refill    Blood Pressure Monitoring (Blood Pressure Cuff) MISC Use every other day For HTN 1 each 0    chlorthalidone 25 mg tablet TAKE 1 TABLET (25 MG TOTAL) BY MOUTH DAILY 90 tablet 2    cyanocobalamin 1,000 mcg/mL INJECT 1 ML (1,000 MCG TOTAL) INTO A MUSCLE EVERY 30 (THIRTY) DAYS 3 mL 10    isosorbide mononitrate (IMDUR) 30 mg 24 hr tablet SB 1 TABLETA POR VIA ORAL DIARIAMENTE TRUNG INDICADO      losartan (COZAAR) 100 MG tablet Take 1 tablet (100 mg total) by mouth daily 90 tablet 0    magnesium oxide (MAG-OX) 400 mg TAKE 1 TABLET (400 MG TOTAL) BY MOUTH DAILY 30 tablet 2    nebivolol (BYSTOLIC) 5 mg tablet Take 1 tablet (5 mg total) by mouth daily 90 tablet 3    polyvinyl alcohol (LIQUIFILM TEARS) 1 4 % ophthalmic solution Administer 1 drop to the right eye as needed for dry eyes 15 mL 0    rivaroxaban (Xarelto) 15 mg tablet Take 1 tablet (15 mg total) by mouth daily at bedtime      rosuvastatin (CRESTOR) 10 MG tablet Take 1 tablet (10 mg total) by mouth daily 90 tablet 0    verapamil (CALAN-SR) 180 mg CR tablet TAKE 1 TABLET (180 MG TOTAL) BY MOUTH DAILY AT BEDTIME 90 tablet 2     Current Facility-Administered Medications   Medication Dose Route Frequency Provider Last Rate Last Admin    cyanocobalamin injection 1,000 mcg  1,000 mcg Intramuscular Q30 Days Rebecca Patricia MD   1,000 mcg at 12/20/21 1238     Allergies   Allergen Reactions    Other Itching     Bandaids    Tape [Medical Tape] Itching      Immunizations:     Immunization History   Administered Date(s) Administered    COVID-19 MODERNA VACC 0 5 ML IM 02/19/2021, 05/27/2021    INFLUENZA 10/11/2005, 10/24/2006, 10/15/2007, 09/28/2015, 10/06/2016, 10/18/2017    Influenza Quadrivalent Preservative Free 3 years and older IM 10/06/2016    Influenza Quadrivalent, 6-35 Months IM 10/18/2017    Influenza, high dose seasonal 0 7 mL 10/12/2018, 09/23/2019, 09/23/2020, 09/22/2021    Influenza, seasonal, injectable 09/24/2013, 09/24/2014, 09/28/2015    Pneumococcal Conjugate 13-Valent 05/31/2016    Pneumococcal Polysaccharide PPV23 01/01/2001, 12/18/2019    Tdap 03/12/2014      Health Maintenance:         Topic Date Due    Colorectal Cancer Screening  09/20/2022         Topic Date Due    COVID-19 Vaccine (3 - Booster for Deryl Ranjit series) 10/27/2021      Medicare Screening Tests and Risk Assessments:     Luis Antonio Orona is here for her Initial Wellness visit  Health Risk Assessment:   Patient rates overall health as poor  Patient feels that their physical health rating is slightly worse  Patient is very satisfied with their life  Eyesight was rated as same  Hearing was rated as slightly worse  Patient feels that their emotional and mental health rating is much better  Patients states they are never, rarely angry  Patient states they are sometimes unusually tired/fatigued  Pain experienced in the last 7 days has been none  Patient states that she has experienced no weight loss or gain in last 6 months  Depression Screening:   PHQ-2 Score: 0      Fall Risk Screening:    In the past year, patient has experienced: no history of falling in past year      Urinary Incontinence Screening:   Patient has not leaked urine accidently in the last six months  Home Safety:  Patient has trouble with stairs inside or outside of their home  Patient has working smoke alarms and has working carbon monoxide detector  Home safety hazards include: none  Nutrition:   Current diet is Limited junk food and Regular  Medications:   Patient is not currently taking any over-the-counter supplements  Patient is able to manage medications  Activities of Daily Living (ADLs)/Instrumental Activities of Daily Living (IADLs):   Walk and transfer into and out of bed and chair?: Yes  Dress and groom yourself?: Yes    Bathe or shower yourself?: Yes    Feed yourself? Yes  Do your laundry/housekeeping?: No  Manage your money, pay your bills and track your expenses?: Yes  Make your own meals?: No    Do your own shopping?: Yes    ADL comments: Will be getting an AID to help ADLs    PREVENTIVE SCREENINGS      Cardiovascular Screening:    General: Screening Not Indicated and History Lipid Disorder      Diabetes Screening:     General: Screening Current      Colorectal Cancer Screening:     General: Screening Current      Breast Cancer Screening:     General: Screening Current      Cervical Cancer Screening:    General: Screening Not Indicated      Lung Cancer Screening:     General: Screening Not Indicated    Screening, Brief Intervention, and Referral to Treatment (SBIRT)    Screening  Typical number of drinks in a day: 0  Typical number of drinks in a week: 0  Interpretation: Low risk drinking behavior      Single Item Drug Screening:  How often have you used an illegal drug (including marijuana) or a prescription medication for non-medical reasons in the past year? never    Single Item Drug Screen Score: 0  Interpretation: Negative screen for possible drug use disorder    No exam data present     Physical Exam: There were no vitals taken for this visit      Physical Exam     Yusuf Fernando MD

## 2022-02-28 NOTE — PROGRESS NOTES
[facility]  INTERNAL MEDICINE OFFICE VISIT     PATIENT INFORMATION     Jerry Lockwood   80 y o  female   MRN: [id number]    ASSESSMENT/PLAN     Diagnoses and all orders for this visit:    B12 deficiency        - Patient has a history of B12 deficiency and received B12 injections  Recent laboratory studies on 01/12/2022 showed an elevated B12 level 1,416  Will hold off on restarting B12 injections and recheck B12 levels in around 3-6 months     -     Vitamin B12; Future    Essential hypertension        - Patients blood pressure remains in the 967'H systolic despite multiple blood pressure medications including chlorthalidone, Imdur, losartan, nebivolol and verapamil  This time will continue patient her current blood pressure regimen and instructed patient to keep a log of her blood pressures to bring to the next appointment  Will also refer patient to Nutrition Services for assistance with her diet  -     Ambulatory Referral to Nutrition Services; Future    Healthcare Maintenance        - Information was given for patient to obtain COVID-19 booster         - Patient was interested in obtaining the shingles vaccine, however she stated that she would like to get it at the next appointment  Schedule a follow-up appointment in 6 months      HEALTH MAINTENANCE     Immunization History   Administered Date(s) Administered    COVID-19 MODERNA VACC 0 5 ML IM 02/19/2021, 05/27/2021    INFLUENZA 10/11/2005, 10/24/2006, 10/15/2007, 09/28/2015, 10/06/2016, 10/18/2017    Influenza Quadrivalent Preservative Free 3 years and older IM 10/06/2016    Influenza Quadrivalent, 6-35 Months IM 10/18/2017    Influenza, high dose seasonal 0 7 mL 10/12/2018, 09/23/2019, 09/23/2020, 09/22/2021    Influenza, seasonal, injectable 09/24/2013, 09/24/2014, 09/28/2015    Pneumococcal Conjugate 13-Valent 05/31/2016    Pneumococcal Polysaccharide PPV23 01/01/2001, 12/18/2019    Tdap 03/12/2014 CHIEF COMPLAINT     Chief Complaint   Patient presents with    Medicare Wellness Visit      HISTORY OF PRESENT ILLNESS     The patient is a 63-year-old female with a past medical history of AFib on Xarelto, tachy-tasha syndrome status post pacemaker placement in 1996, hypertension, hyperlipidemia, GERD, CKD 3, osteoarthritis, B12 deficiency and 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 on 01/13 who presents to the clinic today for 3 month follow-up for blood pressure recheck and an annual wellness visit  Overall today the patient is feeling well  He recently underwent hemicolectomy in January and the pathology returned negative for malignancy  Complaint is she checks her blood pressure at home 3 times daily and it is usually in the 562P systolic  At today's visit her blood pressure was 136/71  Upon recheck it was 139/70  She has been compliant with her blood pressure medications including chlorthalidone, Imdur, losartan, nebivolol and verapamil  Otherwise overall she was feeling well and had no other acute complaints  She denies any recent fevers, chills, chest pain, shortness a breath or abdominal pain  Did request a nutrition referral for help with her diet  She also was interested in obtaining the shingles vaccine at the next visit  She was given information to obtain her COVID-19 booster  REVIEW OF SYSTEMS     Review of Systems   Constitutional: Negative for activity change, appetite change, chills, diaphoresis, fatigue and fever  HENT: Negative for congestion, ear discharge, ear pain, hearing loss, sinus pressure, sinus pain and sore throat  Eyes: Negative for pain, discharge, redness and itching  Respiratory: Negative for cough, chest tightness, shortness of breath and wheezing  Cardiovascular: Negative for chest pain, palpitations and leg swelling     Gastrointestinal: Negative for abdominal distention, abdominal pain, blood in stool, constipation, diarrhea, nausea and vomiting  Genitourinary: Negative for difficulty urinating, dysuria, flank pain and frequency  Musculoskeletal: Negative for arthralgias, back pain and gait problem  Skin: Negative for color change, pallor and rash  Neurological: Negative for dizziness, weakness, light-headedness, numbness and headaches  Psychiatric/Behavioral: Negative for agitation, behavioral problems, confusion and decreased concentration  OBJECTIVE     Vitals:    02/28/22 1349   BP: 136/71   BP Location: Right arm   Patient Position: Sitting   Cuff Size: Standard   Pulse: 65   Temp: 97 6 °F (36 4 °C)   TempSrc: Temporal   Weight: 60 5 kg (133 lb 6 4 oz)     Physical Exam  Constitutional:       Appearance: She is well-developed  HENT:      Head: Normocephalic and atraumatic  Nose: Nose normal    Eyes:      Conjunctiva/sclera: Conjunctivae normal       Pupils: Pupils are equal, round, and reactive to light  Neck:      Vascular: No JVD  Cardiovascular:      Rate and Rhythm: Normal rate and regular rhythm  Heart sounds: Normal heart sounds  No murmur heard  No friction rub  No gallop  Pulmonary:      Effort: Pulmonary effort is normal  No respiratory distress  Breath sounds: Normal breath sounds  No stridor  No wheezing or rales  Chest:      Chest wall: No tenderness  Abdominal:      General: Bowel sounds are normal  There is no distension  Palpations: Abdomen is soft  There is no mass  Tenderness: There is no abdominal tenderness  There is no guarding or rebound  Hernia: No hernia is present  Musculoskeletal:         General: No tenderness or deformity  Right lower leg: No edema  Left lower leg: No edema  Skin:     General: Skin is warm and dry  Neurological:      Mental Status: She is alert and oriented to person, place, and time  Psychiatric:         Mood and Affect: Mood normal          Behavior: Behavior normal          Thought Content:  Thought content normal          Judgment: Judgment normal        CURRENT MEDICATIONS     Current Outpatient Medications:     Blood Pressure Monitoring (Blood Pressure Cuff) MISC, Use every other day For HTN, Disp: 1 each, Rfl: 0    chlorthalidone 25 mg tablet, TAKE 1 TABLET (25 MG TOTAL) BY MOUTH DAILY, Disp: 90 tablet, Rfl: 2    cyanocobalamin 1,000 mcg/mL, INJECT 1 ML (1,000 MCG TOTAL) INTO A MUSCLE EVERY 30 (THIRTY) DAYS, Disp: 3 mL, Rfl: 10    losartan (COZAAR) 100 MG tablet, Take 1 tablet (100 mg total) by mouth daily, Disp: 90 tablet, Rfl: 0    magnesium oxide (MAG-OX) 400 mg, TAKE 1 TABLET (400 MG TOTAL) BY MOUTH DAILY, Disp: 30 tablet, Rfl: 2    nebivolol (BYSTOLIC) 5 mg tablet, Take 1 tablet (5 mg total) by mouth daily, Disp: 90 tablet, Rfl: 3    polyvinyl alcohol (LIQUIFILM TEARS) 1 4 % ophthalmic solution, Administer 1 drop to the right eye as needed for dry eyes, Disp: 15 mL, Rfl: 0    rivaroxaban (Xarelto) 15 mg tablet, Take 1 tablet (15 mg total) by mouth daily at bedtime, Disp: , Rfl:     rosuvastatin (CRESTOR) 10 MG tablet, Take 1 tablet (10 mg total) by mouth daily, Disp: 90 tablet, Rfl: 0    verapamil (CALAN-SR) 180 mg CR tablet, TAKE 1 TABLET (180 MG TOTAL) BY MOUTH DAILY AT BEDTIME, Disp: 90 tablet, Rfl: 2    isosorbide mononitrate (IMDUR) 30 mg 24 hr tablet, SB 1 TABLETA POR VIA ORAL DIARIAMENTE TRUNG INDICADO, Disp: , Rfl:     Current Facility-Administered Medications:     cyanocobalamin injection 1,000 mcg, 1,000 mcg, Intramuscular, Q30 Days, Dane Roy MD, 1,000 mcg at 12/20/21 1238    PAST MEDICAL & SURGICAL HISTORY     Past Medical History:   Diagnosis Date    [facility]     Anemia     Arthritis     Breast pain, right     Chest pain on breathing     Colon polyp     Cough     Diverticulosis     Encounter for screening colonoscopy     H/O sick sinus syndrome     Heart murmur     High cholesterol     History of atrial fibrillation     Rate ontrolled   On xarelto 15mg (creatinine 50) Followed by Dr Miguel Kauffman with Cardiology     History of weight loss     Report loose fitting clothes  Weight stable (3lbs difference)  Last colo showed small tubular adenoma x2  Breast biopsy last showed fibrocystic changes  TSH wnl  Will check cbc, bmp, spep   Hx of long term use of blood thinners     Hypertension     Irregular heart beat     Pacemaker     Preop examination     Shortness of breath     Viral bronchitis      Past Surgical History:   Procedure Laterality Date    BREAST BIOPSY Right 08/17/2006    ultrasound guided Percutaneous Needle Core -Benign    CATARACT EXTRACTION      CATARACT EXTRACTION W/ INTRAOCULAR LENS  IMPLANT, BILATERAL      COLONOSCOPY      COLONOSCOPY N/A 5/22/2018    Procedure: COLONOSCOPY;  Surgeon: Chencho Anderson DO;  [facility]; Service: Gastroenterology    Franc Brooks / Billy Nelson / Nikhil Redman Right     as a child after a fall    MAMMO STEREOTACTIC BREAST BIOPSY RIGHT (ALL INC) Right 10/2014    benign    LA CMBND ANTERPOST COLPORRAPHY W/CYSTO N/A 3/17/2016    Procedure: COLPORRHAPHY ANTERIOR POSTERIOR ;  Surgeon: Mary Jane Garcia MD;  [facility];  Service: Gynecology    LA COLONOSCOPY FLX DX W/[facility] WHEN PFRMD N/A 4/4/2019    Procedure: COLONOSCOPY;  Surgeon: Chencho Anderson DO;  [facility]; Service: Gastroenterology    LA CYSTOURETHROSCOPY N/A 3/17/2016    Procedure: CYSTOSCOPY;  Surgeon: Mary Jane Garcia MD;  [facility];  Service: Gynecology    LA ESOPHAGOGASTRODUODENOSCOPY TRANSORAL DIAGNOSTIC N/A 4/16/2018    Procedure: EGD AND COLONOSCOPY;  Surgeon: Chencho Anderson DO;  [facility];   Service: Gastroenterology    LA LAP,SURG,COLECTOMY, PARTIAL, W/ANAST Right 1/13/2022    Procedure: Diagnostic laparoscopy, laparscopic right colectomy;  Surgeon: Daniela Hastings MD;  [facility];  Service: Colorectal    LA REVAGINAL PROLAPSE,SACROSP LIG N/A 3/17/2016    Procedure: COLPOPEXY VAGINAL EXTRAPERITONEAL (VEC) ANTERIOR ;  Surgeon: Harpal Amaral MD;  [facility];  Service: Gynecology    IN SLING OPER STRES INCONTINENCE N/A 3/17/2016    Procedure: INSERTION PUBOVAGINAL SLING SINGLE INCISION ;  Surgeon: Harpal Amaral MD;  [facility];  Service: Gynecology     SOCIAL & FAMILY HISTORY     Social History     Socioeconomic History    Marital status:      Spouse name: Not on file    Number of children: Not on file    Years of education: Not on file    Highest education level: Not on file   Occupational History    Occupation: retired   Tobacco Use    Smoking status: Never Smoker    Smokeless tobacco: Never Used   Vaping Use    Vaping Use: Never used   Substance and Sexual Activity    Alcohol use: Not Currently    Drug use: No    Sexual activity: Not Currently     Comment:    Other Topics Concern    Not on file   Social History Narrative    Housing, household, and economic circumstances      Social Determinants of Health     Financial Resource Strain: Low Risk     Difficulty of Paying Living Expenses: Not hard at all   Food Insecurity: No Food Insecurity    Worried About [address] in the Last Year: Never true    [facility] in the Last Year: Never true   Transportation Needs: No Transportation Needs    Lack of Transportation (Medical): No    Lack of Transportation (Non-Medical): No   Physical Activity: Inactive    Days of Exercise per Week: 0 days    Minutes of Exercise per Session: 0 min   Stress: No Stress Concern Present    Feeling of Stress : Not at all   Social Connections:  Moderately Isolated    Frequency of Communication with Friends and Family: More than three times a week    Frequency of Social Gatherings with Friends and Family: More than three times a week    Attends Confucianism Services: More than 4 times per year   [employer] of [employer]s or Organizations: No    Attends Club or Organization Meetings: Never    Marital Status:    Intimate Partner Violence: Not At Risk    Fear of Current or Ex-Partner: No    Emotionally Abused: No    Physically Abused: No    Sexually Abused: No   Housing Stability: Low Risk     Unable to Pay for Housing in the Last Year: No    Number of Places Lived in the Last Year: 1    Unstable Housing in the Last Year: No     Social History     Substance and Sexual Activity   Alcohol Use Not Currently     Substance and Sexual Activity   Alcohol Use Not Currently        Substance and Sexual Activity   Drug Use No     Social History     Tobacco Use   Smoking Status Never Smoker   Smokeless Tobacco Never Used     Family History   Problem Relation Age of Onset    Diabetes Mother     Breast cancer Sister 48    No Known Problems Father     No Known Problems Maternal Grandmother     No Known Problems Maternal Grandfather     No Known Problems Paternal Grandmother     No Known Problems Paternal Grandfather     No Known Problems Daughter     No Known Problems Son     No Known Problems Sister     No Known Problems Sister     No Known Problems Brother     No Known Problems Brother     No Known Problems Sister     No Known Problems Paternal Aunt     No Known Problems Paternal Aunt     No Known Problems Paternal Aunt     No Known Problems Paternal Aunt      ==  Jf Brian MD   Resident, PGY-2   [facility] Internal Medicine [address] [place] [address] - [place] , [address] [address] [facility] [facility]  Office: [phone]  Fax: [phone]

## 2022-05-02 ENCOUNTER — OFFICE VISIT (OUTPATIENT)
Dept: INTERNAL MEDICINE CLINIC | Facility: CLINIC | Age: 82
End: 2022-05-02

## 2022-05-02 VITALS
TEMPERATURE: 97.8 F | BODY MASS INDEX: 23.88 KG/M2 | WEIGHT: 134.8 LBS | SYSTOLIC BLOOD PRESSURE: 140 MMHG | HEART RATE: 73 BPM | OXYGEN SATURATION: 96 % | DIASTOLIC BLOOD PRESSURE: 60 MMHG

## 2022-05-02 DIAGNOSIS — R21 RASH: Primary | ICD-10-CM

## 2022-05-02 DIAGNOSIS — Z12.31 ENCOUNTER FOR SCREENING MAMMOGRAM FOR MALIGNANT NEOPLASM OF BREAST: ICD-10-CM

## 2022-05-02 DIAGNOSIS — I10 ESSENTIAL HYPERTENSION: ICD-10-CM

## 2022-05-02 PROCEDURE — 99214 OFFICE O/P EST MOD 30 MIN: CPT | Performed by: INTERNAL MEDICINE

## 2022-05-02 PROCEDURE — 3078F DIAST BP <80 MM HG: CPT | Performed by: INTERNAL MEDICINE

## 2022-05-02 PROCEDURE — 3077F SYST BP >= 140 MM HG: CPT | Performed by: INTERNAL MEDICINE

## 2022-05-02 RX ORDER — VERAPAMIL HYDROCHLORIDE 240 MG/1
240 TABLET, FILM COATED, EXTENDED RELEASE ORAL
Qty: 30 TABLET | Refills: 1 | Status: SHIPPED | OUTPATIENT
Start: 2022-05-02 | End: 2022-07-15

## 2022-05-02 RX ORDER — TRIAMCINOLONE ACETONIDE 1 MG/G
CREAM TOPICAL 2 TIMES DAILY
Qty: 28.4 G | Refills: 1 | Status: SHIPPED | OUTPATIENT
Start: 2022-05-02

## 2022-05-02 NOTE — PATIENT INSTRUCTIONS
Please increase verapamil to 240 mg daily at bedtime  Please continue monitoring your blood pressure at home and bring a log to your next visit  Please apply triamcinolone cream to left arm 2 times daily for the next 1 week  Please obtain screening mammography  Please continue taking allergy medications as needed  Please obtain compression stockings for ankle swelling  Please continue with lifestyle modifications including proper diet and exercise

## 2022-05-02 NOTE — PROGRESS NOTES
[facility]  INTERNAL MEDICINE OFFICE VISIT     PATIENT INFORMATION     Alfornia Lesch   80 y o  female   MRN: [id number]    ASSESSMENT/PLAN     Diagnoses and all orders for this visit:    Rash        - The patient complained of an itchy erythematous rash on her left arm  It was present 1 month ago and returned yesterday  She denies any new creams or contact with any objects in that area  Will prescribe triamcinolone cream and instruct the patient to apply topically twice daily for the next 1 week  She is also instructed to use this in the future if her rash returns  -     triamcinolone (KENALOG) 0 1 % cream; Apply topically 2 (two) times a day Apply to left arm topically 2 times daily for 1 week    Essential hypertension        - At today's appointment the patient's blood pressure remained elevated at 140/60  Will increase her verapamil from 180 mg q h s  2-140 mg q h s     Will also instruct the patient to continue the rest of her regimen including chlorthalidone 25 mg daily, losartan 100 mg daily and nebivolol 5 mg daily  Patient was also instructed to continue taking a log of her blood pressures and bring it with her to the next appointment  -     verapamil (CALAN-SR) 240 mg CR tablet; Take 1 tablet (240 mg total) by mouth daily at bedtime    Encounter for screening mammogram for malignant neoplasm of breast  -     Mammo screening bilateral w 3d & cad; Future    Schedule a follow-up appointment in 3 months      HEALTH MAINTENANCE     Immunization History   Administered Date(s) Administered    COVID-19 MODERNA VACC 0 5 ML IM 02/19/2021, 05/27/2021    INFLUENZA 10/11/2005, 10/24/2006, 10/15/2007, 09/28/2015, 10/06/2016, 10/18/2017    Influenza Quadrivalent Preservative Free 3 years and older IM 10/06/2016    Influenza Quadrivalent, 6-35 Months IM 10/18/2017    Influenza, high dose seasonal 0 7 mL 10/12/2018, 09/23/2019, 09/23/2020, 09/22/2021    Influenza, seasonal, injectable 09/24/2013, 09/24/2014, 09/28/2015    Pneumococcal Conjugate 13-Valent 05/31/2016    Pneumococcal Polysaccharide PPV23 01/01/2001, 12/18/2019    Tdap 03/12/2014       CHIEF COMPLAINT     Chief Complaint   Patient presents with    Follow-up     hypertension      HISTORY OF PRESENT ILLNESS     The patient is an 79-year-old female with a past medical history of hypertension, AFib on Xarelto, hypertension, diverticulosis, tubulovillous adenoma status post right hemicolectomy in January of 2022, GERD, CKD 3 and B12 deficiency who presents to the clinic today for a blood pressure follow-up  She was last seen on 01/31/2022  At that time her blood pressure was 155/60  At that time she was instructed to continue her current regimen of chlorthalidone 25 mg daily, losartan 100 mg daily, nebivolol 5 mg daily and verapamil 180 mg q h s  and to keep a log of her blood pressure to bring to her next appointment  Today's visit her blood pressure remained elevated 140/60  She also presented a log of her home blood pressures which typically range around the 335Z systolic  At today's visit the patient's primary complaint was an erythematous, itchy rash on her left arm  She stated that the rash was present 1 month ago and has now returned yesterday  She denies any new creams or contact with any new objects in that area  Otherwise overall the patient was feeling well and had no other acute complaints  REVIEW OF SYSTEMS     Review of Systems   Constitutional: Negative for activity change, appetite change, chills, diaphoresis, fatigue and fever  HENT: Negative for congestion, ear discharge, ear pain, hearing loss, sinus pressure, sinus pain and sore throat  Eyes: Negative for pain, discharge, redness and itching  Respiratory: Negative for cough, chest tightness, shortness of breath and wheezing  Cardiovascular: Negative for chest pain, palpitations and leg swelling     Gastrointestinal: Negative for abdominal distention, abdominal pain, blood in stool, constipation, diarrhea, nausea and vomiting  Genitourinary: Negative for difficulty urinating, dysuria, flank pain and frequency  Musculoskeletal: Negative for arthralgias, back pain and gait problem  Skin: Positive for rash  Negative for color change and pallor  Neurological: Negative for dizziness, weakness, light-headedness, numbness and headaches  Psychiatric/Behavioral: Negative for agitation, behavioral problems, confusion and decreased concentration  OBJECTIVE     Vitals:    05/02/22 0932   BP: 157/73   BP Location: Left arm   Patient Position: Sitting   Cuff Size: Standard   Pulse: 73   Temp: 97 8 °F (36 6 °C)   TempSrc: Temporal   SpO2: 96%   Weight: 61 1 kg (134 lb 12 8 oz)     Physical Exam  Constitutional:       Appearance: She is well-developed  HENT:      Head: Normocephalic and atraumatic  Nose: Nose normal    Eyes:      Conjunctiva/sclera: Conjunctivae normal       Pupils: Pupils are equal, round, and reactive to light  Neck:      Vascular: No JVD  Cardiovascular:      Rate and Rhythm: Normal rate and regular rhythm  Heart sounds: Normal heart sounds  No murmur heard  No friction rub  No gallop  Pulmonary:      Effort: Pulmonary effort is normal  No respiratory distress  Breath sounds: Normal breath sounds  No stridor  No wheezing or rales  Chest:      Chest wall: No tenderness  Abdominal:      General: Bowel sounds are normal  There is no distension  Palpations: Abdomen is soft  There is no mass  Tenderness: There is no abdominal tenderness  There is no guarding or rebound  Hernia: No hernia is present  Musculoskeletal:         General: No tenderness or deformity  Right lower leg: No edema  Left lower leg: No edema  Skin:     General: Skin is warm and dry  Findings: Rash (Small erythematous rash on lateral left arm) present     Neurological:      Mental Status: She is alert and oriented to person, place, and time  Psychiatric:         Mood and Affect: Mood normal          Behavior: Behavior normal          Thought Content:  Thought content normal          Judgment: Judgment normal        CURRENT MEDICATIONS     Current Outpatient Medications:     Blood Pressure Monitoring (Blood Pressure Cuff) MISC, Use every other day For HTN, Disp: 1 each, Rfl: 0    chlorthalidone 25 mg tablet, TAKE 1 TABLET (25 MG TOTAL) BY MOUTH DAILY, Disp: 90 tablet, Rfl: 2    cyanocobalamin 1,000 mcg/mL, INJECT 1 ML (1,000 MCG TOTAL) INTO A MUSCLE EVERY 30 (THIRTY) DAYS, Disp: 3 mL, Rfl: 10    isosorbide mononitrate (IMDUR) 30 mg 24 hr tablet, SB 1 TABLETA POR VIA ORAL DIARIAMENTE TRUNG INDICADO, Disp: , Rfl:     losartan (COZAAR) 100 MG tablet, Take 1 tablet (100 mg total) by mouth daily, Disp: 90 tablet, Rfl: 0    magnesium oxide (MAG-OX) 400 mg, TAKE 1 TABLET (400 MG TOTAL) BY MOUTH DAILY, Disp: 30 tablet, Rfl: 2    nebivolol (BYSTOLIC) 5 mg tablet, Take 1 tablet (5 mg total) by mouth daily, Disp: 90 tablet, Rfl: 3    polyvinyl alcohol (LIQUIFILM TEARS) 1 4 % ophthalmic solution, Administer 1 drop to the right eye as needed for dry eyes, Disp: 15 mL, Rfl: 0    rivaroxaban (Xarelto) 15 mg tablet, Take 1 tablet (15 mg total) by mouth daily at bedtime, Disp: , Rfl:     rosuvastatin (CRESTOR) 10 MG tablet, Take 1 tablet (10 mg total) by mouth daily, Disp: 90 tablet, Rfl: 0    triamcinolone (KENALOG) 0 1 % cream, Apply topically 2 (two) times a day Apply to left arm topically 2 times daily for 1 week, Disp: 28 4 g, Rfl: 1    verapamil (CALAN-SR) 240 mg CR tablet, Take 1 tablet (240 mg total) by mouth daily at bedtime, Disp: 30 tablet, Rfl: 1    Current Facility-Administered Medications:     cyanocobalamin injection 1,000 mcg, 1,000 mcg, Intramuscular, Q30 Days, Daniel Harris MD, 1,000 mcg at 12/20/21 1238    PAST MEDICAL & SURGICAL HISTORY     Past Medical History:   Diagnosis Date    A-fib ([facility] [address] )     Anemia     Arthritis     Breast pain, right     Chest pain on breathing     Colon polyp     Cough     Diverticulosis     Encounter for screening colonoscopy     H/O sick sinus syndrome     Heart murmur     High cholesterol     History of atrial fibrillation     Rate ontrolled  On xarelto 15mg (creatinine 50) Followed by Dr Konstantin Falcon with Cardiology     History of weight loss     Report loose fitting clothes  Weight stable (3lbs difference)  Last colo showed small tubular adenoma x2  Breast biopsy last showed fibrocystic changes  TSH wnl  Will check cbc, bmp, spep   Hx of long term use of blood thinners     Hypertension     Irregular heart beat     Pacemaker     Preop examination     Shortness of breath     Viral bronchitis      Past Surgical History:   Procedure Laterality Date    BREAST BIOPSY Right 08/17/2006    ultrasound guided Percutaneous Needle Core -Benign    CATARACT EXTRACTION      CATARACT EXTRACTION W/ INTRAOCULAR LENS  IMPLANT, BILATERAL      COLONOSCOPY      COLONOSCOPY N/A 5/22/2018    Procedure: COLONOSCOPY;  Surgeon: Ignacio Thomas DO;  [facility]; Service: Gastroenterology    Aleah Triana / Braulio Gutiérrez / Beryle Spindle Right     as a child after a fall    MAMMO STEREOTACTIC BREAST BIOPSY RIGHT (ALL INC) Right 10/2014    benign    WV CMBND ANTERPOST COLPORRAPHY W/CYSTO N/A 3/17/2016    Procedure: COLPORRHAPHY ANTERIOR POSTERIOR ;  Surgeon: Nilsa Jolley MD;  [facility];  Service: Gynecology    WV COLONOSCOPY FLX DX W/[facility] WHEN PFRMD N/A 4/4/2019    Procedure: COLONOSCOPY;  Surgeon: Ignacio Thomas DO;  [facility];   Service: Gastroenterology    WV CYSTOURETHROSCOPY N/A 3/17/2016    Procedure: CYSTOSCOPY;  Surgeon: Nilsa Jolley MD;  [facility];  Service: Gynecology    WV ESOPHAGOGASTRODUODENOSCOPY TRANSORAL DIAGNOSTIC N/A 4/16/2018    Procedure: EGD AND COLONOSCOPY;  Surgeon: Andre Rosen DO;  [facility]; Service: Gastroenterology    WV LAP,SURG,COLECTOMY, PARTIAL, W/ANAST Right 1/13/2022    Procedure: Diagnostic laparoscopy, laparscopic right colectomy;  Surgeon: Jordan Patterson MD;  [facility];  Service: Colorectal    WV REVAGINAL PROLAPSE,SACROSP LIG N/A 3/17/2016    Procedure: COLPOPEXY VAGINAL EXTRAPERITONEAL (VEC) ANTERIOR ;  Surgeon: Vikki Haines MD;  [facility];  Service: Gynecology    WV SLING OPER STRES INCONTINENCE N/A 3/17/2016    Procedure: INSERTION PUBOVAGINAL SLING SINGLE INCISION ;  Surgeon: Vikki Haines MD;  [facility];  Service: Gynecology     SOCIAL & FAMILY HISTORY     Social History     Socioeconomic History    Marital status:      Spouse name: Not on file    Number of children: Not on file    Years of education: Not on file    Highest education level: Not on file   Occupational History    Occupation: retired   Tobacco Use    Smoking status: Never Smoker    Smokeless tobacco: Never Used   Vaping Use    Vaping Use: Never used   Substance and Sexual Activity    Alcohol use: Not Currently    Drug use: No    Sexual activity: Not Currently     Comment:    Other Topics Concern    Not on file   Social History Narrative    Housing, household, and economic circumstances      Social Determinants of Health     Financial Resource Strain: Low Risk     Difficulty of Paying Living Expenses: Not hard at all   Food Insecurity: No Food Insecurity    Worried About [address] in the Last Year: Never true    [address] in the Last Year: Never true   Transportation Needs: No Transportation Needs    Lack of Transportation (Medical): No    Lack of Transportation (Non-Medical):  No   Physical Activity: Not on file   Stress: Not on file   Social Connections: Not on file   Intimate Partner Violence: Not on file   Housing Stability: Low Risk     Unable to Pay for Housing in the Last Year: No    Number of Places Lived in the Last Year: 1    Unstable Housing in the Last Year: No     Social History     Substance and Sexual Activity   Alcohol Use Not Currently     Social History     Substance and Sexual Activity   Drug Use No     Social History     Tobacco Use   Smoking Status Never Smoker   Smokeless Tobacco Never Used     Family History   Problem Relation Age of Onset    Diabetes Mother     Breast cancer Sister 48    No Known Problems Father     No Known Problems Maternal Grandmother     No Known Problems Maternal Grandfather     No Known Problems Paternal Grandmother     No Known Problems Paternal Grandfather     No Known Problems Daughter     No Known Problems Son     No Known Problems Sister     No Known Problems Sister     No Known Problems Brother     No Known Problems Brother     No Known Problems Sister     No Known Problems Paternal Aunt     No Known Problems Paternal Aunt     No Known Problems Paternal Aunt     No Known Problems Paternal Aunt      ==  Jarret Mayers MD   Resident, PGY-2  [facility] [address] , [address] [address] [facility] [facility]  Office: [phone]  Fax: [phone]

## 2022-05-03 ENCOUNTER — OFFICE VISIT (OUTPATIENT)
Dept: CARDIOLOGY CLINIC | Facility: CLINIC | Age: 82
End: 2022-05-03
Payer: MEDICARE

## 2022-05-03 VITALS
BODY MASS INDEX: 23.53 KG/M2 | HEART RATE: 62 BPM | DIASTOLIC BLOOD PRESSURE: 58 MMHG | HEIGHT: 63 IN | SYSTOLIC BLOOD PRESSURE: 132 MMHG | WEIGHT: 132.8 LBS

## 2022-05-03 DIAGNOSIS — I48.20 CHRONIC ATRIAL FIBRILLATION (HCC): Primary | ICD-10-CM

## 2022-05-03 DIAGNOSIS — R60.9 EDEMA, UNSPECIFIED TYPE: ICD-10-CM

## 2022-05-03 DIAGNOSIS — I70.1 LEFT RENAL ARTERY STENOSIS (HCC): ICD-10-CM

## 2022-05-03 PROCEDURE — 99214 OFFICE O/P EST MOD 30 MIN: CPT | Performed by: INTERNAL MEDICINE

## 2022-05-03 PROCEDURE — 93000 ELECTROCARDIOGRAM COMPLETE: CPT | Performed by: INTERNAL MEDICINE

## 2022-05-03 RX ORDER — FUROSEMIDE 20 MG/1
20 TABLET ORAL DAILY
Qty: 90 TABLET | Refills: 3 | Status: SHIPPED | OUTPATIENT
Start: 2022-05-03

## 2022-05-03 RX ORDER — SPIRONOLACTONE 25 MG/1
25 TABLET ORAL DAILY
COMMUNITY
Start: 2022-03-28

## 2022-05-03 RX ORDER — POTASSIUM CHLORIDE 750 MG/1
10 CAPSULE, EXTENDED RELEASE ORAL DAILY
Qty: 90 CAPSULE | Refills: 3 | Status: SHIPPED | OUTPATIENT
Start: 2022-05-03

## 2022-05-03 NOTE — PROGRESS NOTES
[facility] [address]    F/u afib  Today's Date: 05/03/22        Patient name: Lorie Ramirez  YOB: 1940  Sex: female         Chief Complaint:  Afib,      ASSESSMENT:  Problem List Items Addressed This Visit        Cardiovascular and Mediastinum    Chronic atrial fibrillation ([facility] [address] ) - Primary    Relevant Orders    POCT ECG    Left renal artery stenosis ([facility] [address] )      Other Visit Diagnoses     Edema, unspecified type        Relevant Medications    furosemide (LASIX) 20 mg tablet    potassium chloride (MICRO-K) 10 MEQ CR capsule        81 yo female  1  Permanent afib rates controlled on xarelto 15mg [id number]    2  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  She had left sided erosion remotely and lead extracted  Paces up to 76 % since they increased bystolic  EF  normal    3  Cath for borderline stress test done this year, 50% RCA and nonobstructive- she had Imdur started, bystolic doubled to 5mg bid and rosumvastatin added  4  HTN reasonable control  5 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    6  CKD CR 1 0 on last BMP  7  Mild lupillo edema, maybe from pulm htn and verpamil  8  SOB workup last year, cath was noobstructive, echo nl EF and mild pulm edema  DISCUSSION AND PLAN:    1  D/c chlorthalidone, start lasix 20mg daily and potassium 10meq daily  2  Cont xarelt, bystolic, verpamil  F/u 6 months      Orders Placed This Encounter   Procedures    POCT ECG     Medications Discontinued During This Encounter   Medication Reason    isosorbide mononitrate (IMDUR) 30 mg 24 hr tablet     chlorthalidone 25 mg tablet        Follow up in: 6 months        HPI:   81 yo female  1  Permanent afib rates controlled on xarelto 15mg [id number]    2  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  She had left sided erosion remotely and lead extracted   Paces up to 76 % since they increased bystolic  EF  normal    3  Cath for borderline stress test done this year, 50% RCA and nonobstructive- she had Imdur started, bystolic doubled to 5mg bid and rosumvastatin added  4  HTN reasonable control  5 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    6  CKD CR 1 0 on last BMP  7  Mild lupillo edema, maybe from pulm htn and verpamil  8  SOB workup last year, cath was noobstructive, echo nl EF and mild pulm edema  Please note HPI is listed by problem with with update following it, it is copied again in the assessment above and reflects medical decision making as well  Complete 12 point ROS reviewed and otherwise non pertinent or negative except as per HPI  Please see paper chart for outpatient clinic patients where the patient completed the 12 point ROS survey  Past Medical History:   Diagnosis Date    A-fib ([facility] [address] )     Anemia     Arthritis     Breast pain, right     Chest pain on breathing     Colon polyp     Cough     Diverticulosis     Encounter for screening colonoscopy     H/O sick sinus syndrome     Heart murmur     High cholesterol     History of atrial fibrillation     Rate ontrolled  On xarelto 15mg (creatinine 50) Followed by Dr Denisa Price with Cardiology     History of weight loss     Report loose fitting clothes  Weight stable (3lbs difference)  Last colo showed small tubular adenoma x2  Breast biopsy last showed fibrocystic changes  TSH wnl  Will check cbc, bmp, spep   Hx of long term use of blood thinners     Hypertension     Irregular heart beat     Pacemaker     Preop examination     Shortness of breath     Viral bronchitis        Allergies   Allergen Reactions    Other Itching     Bandaids    Tape [Medical Tape] Itching     I reviewed the Home Medication list and Allergies in the chart     Scheduled Meds:  Current Outpatient Medications   Medication Sig Dispense Refill    Blood Pressure Monitoring (Blood Pressure Cuff) MISC Use every other day For HTN 1 each 0    cyanocobalamin 1,000 mcg/mL INJECT 1 ML (1,000 MCG TOTAL) INTO A MUSCLE EVERY 30 (THIRTY) DAYS 3 mL 10    losartan (COZAAR) 100 MG tablet Take 1 tablet (100 mg total) by mouth daily 90 tablet 0    magnesium oxide (MAG-OX) 400 mg TAKE 1 TABLET (400 MG TOTAL) BY MOUTH DAILY 30 tablet 2    nebivolol (BYSTOLIC) 5 mg tablet Take 1 tablet (5 mg total) by mouth daily 90 tablet 3    polyvinyl alcohol (LIQUIFILM TEARS) 1 4 % ophthalmic solution Administer 1 drop to the right eye as needed for dry eyes 15 mL 0    rivaroxaban (Xarelto) 15 mg tablet Take 1 tablet (15 mg total) by mouth daily at bedtime      rosuvastatin (CRESTOR) 10 MG tablet Take 1 tablet (10 mg total) by mouth daily 90 tablet 0    spironolactone (ALDACTONE) 25 mg tablet Take 25 mg by mouth daily      triamcinolone (KENALOG) 0 1 % cream Apply topically 2 (two) times a day Apply to left arm topically 2 times daily for 1 week 28 4 g 1    verapamil (CALAN-SR) 240 mg CR tablet Take 1 tablet (240 mg total) by mouth daily at bedtime 30 tablet 1    furosemide (LASIX) 20 mg tablet Take 1 tablet (20 mg total) by mouth daily 90 tablet 3    potassium chloride (MICRO-K) 10 MEQ CR capsule Take 1 capsule (10 mEq total) by mouth daily 90 capsule 3     Current Facility-Administered Medications   Medication Dose Route Frequency Provider Last Rate Last Admin    cyanocobalamin injection 1,000 mcg  1,000 mcg Intramuscular Q30 Days Severino Samson MD   1,000 mcg at 12/20/21 1238     PRN Meds:         Family History   Problem Relation Age of Onset    Diabetes Mother     Breast cancer Sister 48    No Known Problems Father     No Known Problems Maternal Grandmother     No Known Problems Maternal Grandfather     No Known Problems Paternal Grandmother     No Known Problems Paternal Grandfather     No Known Problems Daughter     No Known Problems Son     No Known Problems Sister     No Known Problems Sister     No Known Problems Brother  No Known Problems Brother     No Known Problems Sister     No Known Problems Paternal Aunt     No Known Problems Paternal Aunt     No Known Problems Paternal Aunt     No Known Problems Paternal Aunt        Social History     Socioeconomic History    Marital status:      Spouse name: Not on file    Number of children: Not on file    Years of education: Not on file    Highest education level: Not on file   Occupational History    Occupation: retired   Tobacco Use    Smoking status: Never Smoker    Smokeless tobacco: Never Used   Vaping Use    Vaping Use: Never used   Substance and Sexual Activity    Alcohol use: Not Currently    Drug use: No    Sexual activity: Not Currently     Comment:    Other Topics Concern    Not on file   Social History Narrative    Housing, household, and economic circumstances      Social Determinants of Health     Financial Resource Strain: Low Risk     Difficulty of Paying Living Expenses: Not hard at all   Food Insecurity: No Food Insecurity    Worried About [address] in the Last Year: Never true    [address] [place] in the Last Year: Never true   Transportation Needs: No Transportation Needs    Lack of Transportation (Medical): No    Lack of Transportation (Non-Medical):  No   Physical Activity: Not on file   Stress: Not on file   Social Connections: Not on file   Intimate Partner Violence: Not on file   Housing Stability: Low Risk     Unable to Pay for Housing in the Last Year: No    Number of Places Lived in the Last Year: 1    Unstable Housing in the Last Year: No         OBJECTIVE:    /58 (BP Location: Right arm, Patient Position: Sitting, Cuff Size: Standard)   Pulse 62   Ht 5' 3" (1 6 m)   Wt 60 2 kg (132 lb 12 8 oz)   BMI 23 52 kg/m²   Vitals:    05/03/22 1028   Weight: 60 2 kg (132 lb 12 8 oz)     /58 (BP Location: Right arm, Patient Position: Sitting, Cuff Size: Standard)   Pulse 62   Ht 5' 3" (1 6 m)   Wt 60 2 kg (132 lb 12 8 oz)   BMI 23 52 kg/m²   Vitals:    05/03/22 1028   Weight: 60 2 kg (132 lb 12 8 oz)     /58 (BP Location: Right arm, Patient Position: Sitting, Cuff Size: Standard)   Pulse 62   Ht 5' 3" (1 6 m)   Wt 60 2 kg (132 lb 12 8 oz)   BMI 23 52 kg/m²   Vitals:    05/03/22 1028   Weight: 60 2 kg (132 lb 12 8 oz)     GEN: No acute  distress, Alert and oriented, well appearing  HEENT:External ears normal, wearing a mask  EYES: Pupils equal, sclera anicteric, midline, normal conjuctiva  NECK: No JVD, supple, no obvious masses or thryomegaly or goiter  CARDIOVASCULAR:  RRR, No murmur, rub, gallops S1,S2  LUNGS: Clear To auscultation bilaterally, normal effort, no rales, rhonchi, crackles   ABDOMEN:  nondistended,  without obvious organomegaly or ascites  EXTREMITIES/VASCULAR: trace lupillo edema  warm an well perfused  PSYCH: Normal Affect,  linear speech pattern without evidence of psychosis  NEURO: Grossly intact, moving all extremiteis equal, face symmetric, alert and responsive, no obvious focal defecits   GAIT:  Ambulates normally without difficulty  HEME: No bleeding, bruising, petechia, purpura   SKIN: No significant rashes on visibile skin, warm, no diaphoresis or pallor           Lab Results:     @RESULTRCNT(1M])@    CBC with diff:     CMP:      Invalid input(s): ALBUMIN  TSH:       Lipid Profile:             I reviewed and interpreted the following LABS/EKG/TELE/IMAGING and below is summary of my interpretation (if data available):    LABS:nl cr but potassium low 3 4    Current EKG and Rhythm Strip:Afib w V pacing    Past EKGs and RHYTHM strip: Kumar EKG Afib w V pacing      CXR: right sided pacemaker single chamber  Checks show 76% RV pacing

## 2022-05-05 ENCOUNTER — REMOTE DEVICE CLINIC VISIT (OUTPATIENT)
Dept: CARDIOLOGY CLINIC | Facility: CLINIC | Age: 82
End: 2022-05-05
Payer: MEDICARE

## 2022-05-05 DIAGNOSIS — Z95.0 PRESENCE OF PERMANENT CARDIAC PACEMAKER: Primary | ICD-10-CM

## 2022-05-05 PROCEDURE — 93294 REM INTERROG EVL PM/LDLS PM: CPT | Performed by: INTERNAL MEDICINE

## 2022-05-05 PROCEDURE — 93296 REM INTERROG EVL PM/IDS: CPT | Performed by: INTERNAL MEDICINE

## 2022-05-05 NOTE — PROGRESS NOTES
MDT-SINGLE CHAMBER PPM / NOT MRI CONDITIONAL   CARELINK TRANSMISSION:  BATTERY VOLTAGE ADEQUATE (10 0 YR)    61 0% (>40%/VVIR 60 PPM)    ALL LEAD PARAMETERS WITHIN NORMAL LIMITS    NO SIGNIFICANT HIGH RATE EPISODES   NORMAL DEVICE FUNCTION   RG

## 2022-05-10 ENCOUNTER — HOSPITAL ENCOUNTER (OUTPATIENT)
Dept: MAMMOGRAPHY | Facility: HOSPITAL | Age: 82
Discharge: HOME | End: 2022-05-10
Payer: MEDICARE

## 2022-05-10 VITALS — HEIGHT: 63 IN | BODY MASS INDEX: 23.52 KG/M2 | WEIGHT: 132.72 LBS

## 2022-05-10 DIAGNOSIS — Z12.31 ENCOUNTER FOR SCREENING MAMMOGRAM FOR MALIGNANT NEOPLASM OF BREAST: ICD-10-CM

## 2022-05-10 PROCEDURE — 77067 SCR MAMMO BI INCL CAD: CPT

## 2022-05-10 PROCEDURE — 77063 BREAST TOMOSYNTHESIS BI: CPT

## 2022-05-12 ENCOUNTER — TELEPHONE (OUTPATIENT)
Dept: INTERNAL MEDICINE CLINIC | Facility: CLINIC | Age: 82
End: 2022-05-12

## 2022-05-12 NOTE — TELEPHONE ENCOUNTER
verapamil (CALAN-SR) 240 mg CR tablet     triamcinolone (KENALOG) 0 1 % cream    Patient called to state that she called the pharmacy sometime last week and was told that they never received the escript  Put patient on hold, called [facility] [phone] spoke to Susana Solano who stated that they received the escripts, they got the ok from the insurance and will send the medication to the patient      Advised patient who was thankful

## 2022-05-25 ENCOUNTER — APPOINTMENT (OUTPATIENT)
Dept: LAB | Facility: HOSPITAL | Age: 82
End: 2022-05-25
Payer: MEDICARE

## 2022-05-25 DIAGNOSIS — D64.9 ANEMIA, UNSPECIFIED TYPE: ICD-10-CM

## 2022-05-25 DIAGNOSIS — E53.8 B12 DEFICIENCY: ICD-10-CM

## 2022-05-25 LAB
BASOPHILS # BLD AUTO: 0.03 THOUSANDS/ΜL (ref 0–0.1)
BASOPHILS NFR BLD AUTO: 0 % (ref 0–1)
EOSINOPHIL # BLD AUTO: 0.06 THOUSAND/ΜL (ref 0–0.61)
EOSINOPHIL NFR BLD AUTO: 1 % (ref 0–6)
ERYTHROCYTE [DISTWIDTH] IN BLOOD BY AUTOMATED COUNT: 14.3 % (ref 11.6–15.1)
HCT VFR BLD AUTO: 38.9 % (ref 34.8–46.1)
HGB BLD-MCNC: 12.2 G/DL (ref 11.5–15.4)
IMM GRANULOCYTES # BLD AUTO: 0.04 THOUSAND/UL (ref 0–0.2)
IMM GRANULOCYTES NFR BLD AUTO: 1 % (ref 0–2)
LYMPHOCYTES # BLD AUTO: 1.55 THOUSANDS/ΜL (ref 0.6–4.47)
LYMPHOCYTES NFR BLD AUTO: 22 % (ref 14–44)
MCH RBC QN AUTO: 28.8 PG (ref 26.8–34.3)
MCHC RBC AUTO-ENTMCNC: 31.4 G/DL (ref 31.4–37.4)
MCV RBC AUTO: 92 FL (ref 82–98)
MONOCYTES # BLD AUTO: 0.73 THOUSAND/ΜL (ref 0.17–1.22)
MONOCYTES NFR BLD AUTO: 11 % (ref 4–12)
NEUTROPHILS # BLD AUTO: 4.51 THOUSANDS/ΜL (ref 1.85–7.62)
NEUTS SEG NFR BLD AUTO: 65 % (ref 43–75)
NRBC BLD AUTO-RTO: 0 /100 WBCS
PLATELET # BLD AUTO: 242 THOUSANDS/UL (ref 149–390)
PMV BLD AUTO: 11.1 FL (ref 8.9–12.7)
RBC # BLD AUTO: 4.23 MILLION/UL (ref 3.81–5.12)
VIT B12 SERPL-MCNC: 404 PG/ML (ref 100–900)
WBC # BLD AUTO: 6.92 THOUSAND/UL (ref 4.31–10.16)

## 2022-05-25 PROCEDURE — 36415 COLL VENOUS BLD VENIPUNCTURE: CPT

## 2022-05-25 PROCEDURE — 82607 VITAMIN B-12: CPT

## 2022-05-25 PROCEDURE — 85025 COMPLETE CBC W/AUTO DIFF WBC: CPT

## 2022-06-02 DIAGNOSIS — E78.5 HYPERLIPIDEMIA LDL GOAL <100: ICD-10-CM

## 2022-06-02 RX ORDER — ROSUVASTATIN CALCIUM 10 MG/1
10 TABLET, COATED ORAL DAILY
Qty: 90 TABLET | Refills: 0 | Status: SHIPPED | OUTPATIENT
Start: 2022-06-02

## 2022-06-29 ENCOUNTER — TELEPHONE (OUTPATIENT)
Dept: INTERNAL MEDICINE CLINIC | Facility: CLINIC | Age: 82
End: 2022-06-29

## 2022-06-29 NOTE — TELEPHONE ENCOUNTER
Patient is calling to speak to the   But she is [place] of [place] speaking  She wants to talk to her about something personal  She said it does have something to do with housing but its personal and she wants to speak to the

## 2022-07-01 ENCOUNTER — PATIENT OUTREACH (OUTPATIENT)
Dept: INTERNAL MEDICINE CLINIC | Facility: CLINIC | Age: 82
End: 2022-07-01

## 2022-07-01 DIAGNOSIS — Z78.9 NEEDS ASSISTANCE WITH COMMUNITY RESOURCES: Primary | ICD-10-CM
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
## 2022-07-14 DIAGNOSIS — I10 ESSENTIAL HYPERTENSION: ICD-10-CM

## 2022-07-15 RX ORDER — VERAPAMIL HYDROCHLORIDE 240 MG/1
240 TABLET, FILM COATED, EXTENDED RELEASE ORAL
Qty: 30 TABLET | Refills: 1 | Status: SHIPPED | OUTPATIENT
Start: 2022-07-15 | End: 2022-07-26

## 2022-07-25 ENCOUNTER — TELEPHONE (OUTPATIENT)
Dept: INTERNAL MEDICINE CLINIC | Facility: CLINIC | Age: 82
End: 2022-07-25

## 2022-07-25 NOTE — TELEPHONE ENCOUNTER
Ruel Beasley   at [employer] [phone] called and is requesting that our office provide a letter of medical necessity be faxed to [facility] [phone]  Fax      [phone]    Attention Ruth Ann Hearing   ? Letter to indicate her medical condition and the fact that she needs to be moved from the 8th floor to a lower level, preferably 1st floor  Please check into this and prepare the letter     Please call the patient to advise and also call Radha Manuel     thanks

## 2022-07-26 ENCOUNTER — PATIENT OUTREACH (OUTPATIENT)
Dept: INTERNAL MEDICINE CLINIC | Facility: CLINIC | Age: 82
End: 2022-07-26

## 2022-07-26 NOTE — TELEPHONE ENCOUNTER
[address] needs to state the pt NEEDS this change due to her medical condition  They do not need specific I ws told  Pt is requesting this so it will be safer for her to Exit during an Emergency  She is now on the 8 th floor and these are too many steps for her  Pt also wants a copy    Thanks   Leaderz

## 2022-07-26 NOTE — PROGRESS NOTES
SW has returned call to pt via  ID [id number]   Pt is seeking a letter from PCP requesting that she be able to be moved to an apt on a lower floor of her [facility] [address] which is part of [employer]  Sw has spoken to Blomming from [employer] and she shares the letter just needs to just state she NEEDS this accomodation so she can be on a lower level so it will be safer for her to exit in an emergency  She then will go on their Transfer list and they will make this accomodation when they can   This Letter can be FAXED to Blomming @ FAX # [phone] and pt also request a copy be mailed to herself as well  SW will f/u with PCP and Clerical/ Clinical Team and pt;s [address] same

## 2022-07-29 NOTE — TELEPHONE ENCOUNTER
Letter was faxed to the number provided below  Confirmation was received and will be scanned into patients chart  Patient was made aware that letter was ready for pick and she was very appreciative  Letter placed in accordion folder

## 2022-07-29 NOTE — TELEPHONE ENCOUNTER
A letter to [employer] was written and sent to the patient  Please ensure that the patient receives the letter  Thank you

## 2022-07-29 NOTE — PROGRESS NOTES
A letter for [employer] was completed in the patient's chart  Please fax it to Elmo at [phone]  Thank you

## 2022-08-02 ENCOUNTER — PATIENT OUTREACH (OUTPATIENT)
Dept: INTERNAL MEDICINE CLINIC | Facility: CLINIC | Age: 82
End: 2022-08-02

## 2022-08-02 NOTE — PROGRESS NOTES
MIS received an update from [facility] the requested letter has been forwarded to [employer] as per pt and her [employer] request     Staff hs informed pt and MIS has attempted to notify pt Service Coordiantor Ruel Beasley [phone] but her phone was not in service  Mis did notify her Supervisor Donny Nelson @ [phone] who sent her an email  Please  Re-Consult MIS if needed

## 2022-08-04 ENCOUNTER — REMOTE DEVICE CLINIC VISIT (OUTPATIENT)
Dept: CARDIOLOGY CLINIC | Facility: CLINIC | Age: 82
End: 2022-08-04
Payer: MEDICARE

## 2022-08-04 DIAGNOSIS — Z95.0 PRESENCE OF PERMANENT CARDIAC PACEMAKER: Primary | ICD-10-CM

## 2022-08-04 PROCEDURE — 93294 REM INTERROG EVL PM/LDLS PM: CPT | Performed by: INTERNAL MEDICINE

## 2022-08-04 PROCEDURE — 93296 REM INTERROG EVL PM/IDS: CPT | Performed by: INTERNAL MEDICINE

## 2022-08-04 NOTE — PROGRESS NOTES
MDT-SINGLE CHAMBER PPM / NOT MRI CONDITIONAL   CARELINK TRANSMISSION:  BATTERY VOLTAGE ADEQUATE (9 7 YR )    72 9% (>40%/VVIR 60 PPM)    ALL LEAD PARAMETERS WITHIN NORMAL LIMITS   NO SIGNIFICANT HIGH RATE EPISODES    NORMAL DEVICE FUNCTION   RG

## 2022-08-12 ENCOUNTER — APPOINTMENT (EMERGENCY)
Dept: RADIOLOGY | Facility: HOSPITAL | Age: 82
DRG: 065 | End: 2022-08-12
Payer: MEDICARE

## 2022-08-12 ENCOUNTER — HOSPITAL ENCOUNTER (INPATIENT)
Facility: HOSPITAL | Age: 82
LOS: 7 days | DRG: 065 | End: 2022-08-19
Attending: EMERGENCY MEDICINE | Admitting: EMERGENCY MEDICINE
Payer: MEDICARE

## 2022-08-12 DIAGNOSIS — I63.511 ARTERIAL ISCHEMIC STROKE, MCA (MIDDLE CEREBRAL ARTERY), RIGHT, ACUTE (HCC): ICD-10-CM

## 2022-08-12 DIAGNOSIS — I10 ESSENTIAL HYPERTENSION: ICD-10-CM

## 2022-08-12 DIAGNOSIS — I63.30 CEREBRAL THROMBOSIS WITH CEREBRAL INFARCTION (HCC): Primary | ICD-10-CM

## 2022-08-12 DIAGNOSIS — I63.9 ACUTE CVA (CEREBROVASCULAR ACCIDENT) (HCC): ICD-10-CM

## 2022-08-12 DIAGNOSIS — E78.5 HYPERLIPIDEMIA LDL GOAL <100: ICD-10-CM

## 2022-08-12 LAB
2HR DELTA HS TROPONIN: 0 NG/L
4HR DELTA HS TROPONIN: 1 NG/L
ANION GAP SERPL CALCULATED.3IONS-SCNC: 7 MMOL/L (ref 4–13)
APTT PPP: 29 SECONDS (ref 23–37)
APTT PPP: 50 SECONDS (ref 23–37)
APTT PPP: 57 SECONDS (ref 23–37)
APTT PPP: 70 SECONDS (ref 23–37)
ATRIAL RATE: 42 BPM
BUN SERPL-MCNC: 18 MG/DL (ref 5–25)
CALCIUM SERPL-MCNC: 9.5 MG/DL (ref 8.3–10.1)
CARDIAC TROPONIN I PNL SERPL HS: 8 NG/L
CARDIAC TROPONIN I PNL SERPL HS: 8 NG/L
CARDIAC TROPONIN I PNL SERPL HS: 9 NG/L
CHLORIDE SERPL-SCNC: 109 MMOL/L (ref 96–108)
CO2 SERPL-SCNC: 23 MMOL/L (ref 21–32)
CREAT SERPL-MCNC: 1.07 MG/DL (ref 0.6–1.3)
ERYTHROCYTE [DISTWIDTH] IN BLOOD BY AUTOMATED COUNT: 14.5 % (ref 11.6–15.1)
ERYTHROCYTE [DISTWIDTH] IN BLOOD BY AUTOMATED COUNT: 14.5 % (ref 11.6–15.1)
FLUAV RNA RESP QL NAA+PROBE: NEGATIVE
FLUBV RNA RESP QL NAA+PROBE: NEGATIVE
GFR SERPL CREATININE-BSD FRML MDRD: 48 ML/MIN/1.73SQ M
GLUCOSE SERPL-MCNC: 118 MG/DL (ref 65–140)
GLUCOSE SERPL-MCNC: 129 MG/DL (ref 65–140)
HCT VFR BLD AUTO: 33 % (ref 34.8–46.1)
HCT VFR BLD AUTO: 35.8 % (ref 34.8–46.1)
HGB BLD-MCNC: 10.4 G/DL (ref 11.5–15.4)
HGB BLD-MCNC: 11.3 G/DL (ref 11.5–15.4)
INR PPP: 1.57 (ref 0.84–1.19)
INR PPP: 1.86 (ref 0.84–1.19)
MCH RBC QN AUTO: 29.7 PG (ref 26.8–34.3)
MCH RBC QN AUTO: 30.1 PG (ref 26.8–34.3)
MCHC RBC AUTO-ENTMCNC: 31.5 G/DL (ref 31.4–37.4)
MCHC RBC AUTO-ENTMCNC: 31.6 G/DL (ref 31.4–37.4)
MCV RBC AUTO: 94 FL (ref 82–98)
MCV RBC AUTO: 95 FL (ref 82–98)
PLATELET # BLD AUTO: 212 THOUSANDS/UL (ref 149–390)
PLATELET # BLD AUTO: 231 THOUSANDS/UL (ref 149–390)
PMV BLD AUTO: 10.7 FL (ref 8.9–12.7)
PMV BLD AUTO: 11.1 FL (ref 8.9–12.7)
POTASSIUM SERPL-SCNC: 4.1 MMOL/L (ref 3.5–5.3)
PROTHROMBIN TIME: 19 SECONDS (ref 11.6–14.5)
PROTHROMBIN TIME: 21.7 SECONDS (ref 11.6–14.5)
QRS AXIS: -80 DEGREES
QRSD INTERVAL: 138 MS
QT INTERVAL: 454 MS
QTC INTERVAL: 479 MS
RBC # BLD AUTO: 3.5 MILLION/UL (ref 3.81–5.12)
RBC # BLD AUTO: 3.76 MILLION/UL (ref 3.81–5.12)
RSV RNA RESP QL NAA+PROBE: NEGATIVE
SARS-COV-2 RNA RESP QL NAA+PROBE: NEGATIVE
SODIUM SERPL-SCNC: 139 MMOL/L (ref 135–147)
T WAVE AXIS: 88 DEGREES
VENTRICULAR RATE: 67 BPM
WBC # BLD AUTO: 11.3 THOUSAND/UL (ref 4.31–10.16)
WBC # BLD AUTO: 9.56 THOUSAND/UL (ref 4.31–10.16)

## 2022-08-12 PROCEDURE — 93010 ELECTROCARDIOGRAM REPORT: CPT | Performed by: INTERNAL MEDICINE

## 2022-08-12 PROCEDURE — 99223 1ST HOSP IP/OBS HIGH 75: CPT | Performed by: PSYCHIATRY & NEUROLOGY

## 2022-08-12 PROCEDURE — 0241U HB NFCT DS VIR RESP RNA 4 TRGT: CPT | Performed by: EMERGENCY MEDICINE

## 2022-08-12 PROCEDURE — 80048 BASIC METABOLIC PNL TOTAL CA: CPT | Performed by: EMERGENCY MEDICINE

## 2022-08-12 PROCEDURE — 82948 REAGENT STRIP/BLOOD GLUCOSE: CPT

## 2022-08-12 PROCEDURE — 0042T HB CT PERFUSION W/CONTRAST CBF: CPT

## 2022-08-12 PROCEDURE — 85610 PROTHROMBIN TIME: CPT | Performed by: PSYCHIATRY & NEUROLOGY

## 2022-08-12 PROCEDURE — 99291 CRITICAL CARE FIRST HOUR: CPT | Performed by: EMERGENCY MEDICINE

## 2022-08-12 PROCEDURE — 36415 COLL VENOUS BLD VENIPUNCTURE: CPT | Performed by: EMERGENCY MEDICINE

## 2022-08-12 PROCEDURE — 84484 ASSAY OF TROPONIN QUANT: CPT | Performed by: PSYCHIATRY & NEUROLOGY

## 2022-08-12 PROCEDURE — 99285 EMERGENCY DEPT VISIT HI MDM: CPT

## 2022-08-12 PROCEDURE — 85027 COMPLETE CBC AUTOMATED: CPT | Performed by: EMERGENCY MEDICINE

## 2022-08-12 PROCEDURE — 85730 THROMBOPLASTIN TIME PARTIAL: CPT | Performed by: EMERGENCY MEDICINE

## 2022-08-12 PROCEDURE — 85610 PROTHROMBIN TIME: CPT | Performed by: EMERGENCY MEDICINE

## 2022-08-12 PROCEDURE — 85730 THROMBOPLASTIN TIME PARTIAL: CPT | Performed by: PSYCHIATRY & NEUROLOGY

## 2022-08-12 PROCEDURE — 93005 ELECTROCARDIOGRAM TRACING: CPT

## 2022-08-12 PROCEDURE — 84484 ASSAY OF TROPONIN QUANT: CPT | Performed by: EMERGENCY MEDICINE

## 2022-08-12 PROCEDURE — 85027 COMPLETE CBC AUTOMATED: CPT

## 2022-08-12 RX ORDER — HEPARIN SODIUM 10000 [USP'U]/100ML
3-24 INJECTION, SOLUTION INTRAVENOUS
Status: DISCONTINUED | OUTPATIENT
Start: 2022-08-12 | End: 2022-08-15

## 2022-08-12 RX ORDER — SODIUM CHLORIDE, SODIUM GLUCONATE, SODIUM ACETATE, POTASSIUM CHLORIDE, MAGNESIUM CHLORIDE, SODIUM PHOSPHATE, DIBASIC, AND POTASSIUM PHOSPHATE .53; .5; .37; .037; .03; .012; .00082 G/100ML; G/100ML; G/100ML; G/100ML; G/100ML; G/100ML; G/100ML
500 INJECTION, SOLUTION INTRAVENOUS ONCE
Status: COMPLETED | OUTPATIENT
Start: 2022-08-12 | End: 2022-08-13

## 2022-08-12 RX ADMIN — SODIUM CHLORIDE, SODIUM GLUCONATE, SODIUM ACETATE, POTASSIUM CHLORIDE, MAGNESIUM CHLORIDE, SODIUM PHOSPHATE, DIBASIC, AND POTASSIUM PHOSPHATE 500 ML: .53; .5; .37; .037; .03; .012; .00082 INJECTION, SOLUTION INTRAVENOUS at 23:42

## 2022-08-12 RX ADMIN — IOHEXOL 70 ML: 350 INJECTION, SOLUTION INTRAVENOUS at 09:53

## 2022-08-12 RX ADMIN — HEPARIN SODIUM 15 UNITS/KG/HR: 10000 INJECTION, SOLUTION INTRAVENOUS at 12:03

## 2022-08-12 RX ADMIN — IOHEXOL 80 ML: 350 INJECTION, SOLUTION INTRAVENOUS at 10:42

## 2022-08-12 NOTE — PLAN OF CARE
Problem: Prexisting or High Potential for Compromised Skin Integrity  Goal: Skin integrity is maintained or improved  Description: INTERVENTIONS:  - Identify patients at risk for skin breakdown  - Assess and monitor skin integrity  - Assess and monitor nutrition and hydration status  - Monitor labs   - Assess for incontinence   - Turn and reposition patient  - Assist with mobility/ambulation  - Relieve pressure over bony prominences  - Avoid friction and shearing  - Provide appropriate hygiene as needed including keeping skin clean and dry  - Evaluate need for skin moisturizer/barrier cream  - Collaborate with interdisciplinary team   - Patient/family teaching  - Consider wound care consult   Outcome: Progressing     Problem: MOBILITY - ADULT  Goal: Maintain or return to baseline ADL function  Description: INTERVENTIONS:  -  Assess patient's ability to carry out ADLs; assess patient's baseline for ADL function and identify physical deficits which impact ability to perform ADLs (bathing, care of mouth/teeth, toileting, grooming, dressing, etc )  - Assess/evaluate cause of self-care deficits   - Assess range of motion  - Assess patient's mobility; develop plan if impaired  - Assess patient's need for assistive devices and provide as appropriate  - Encourage maximum independence but intervene and supervise when necessary  - Involve family in performance of ADLs  - Assess for home care needs following discharge   - Consider OT consult to assist with ADL evaluation and planning for discharge  - Provide patient education as appropriate  Outcome: Progressing  Goal: Maintains/Returns to pre admission functional level  Description: INTERVENTIONS:  - Perform BMAT or MOVE assessment daily    - Set and communicate daily mobility goal to care team and patient/family/caregiver     - Collaborate with rehabilitation services on mobility goals if consulted  n of activity level   Outcome: Progressing     Problem: PAIN - ADULT  Goal: Verbalizes/displays adequate comfort level or baseline comfort level  Description: Interventions:  - Encourage patient to monitor pain and request assistance  - Assess pain using appropriate pain scale  - Administer analgesics based on type and severity of pain and evaluate response  - Implement non-pharmacological measures as appropriate and evaluate response  - Consider cultural and social influences on pain and pain management  - Notify physician/advanced practitioner if interventions unsuccessful or patient reports new pain  Outcome: Progressing     Problem: INFECTION - ADULT  Goal: Absence or prevention of progression during hospitalization  Description: INTERVENTIONS:  - Assess and monitor for signs and symptoms of infection  - Monitor lab/diagnostic results  - Monitor all insertion sites, i e  indwelling lines, tubes, and drains  - Monitor endotracheal if appropriate and nasal secretions for changes in amount and color  - Lena appropriate cooling/warming therapies per order  - Administer medications as ordered  - Instruct and encourage patient and family to use good hand hygiene technique  - Identify and instruct in appropriate isolation precautions for identified infection/condition  Outcome: Progressing  Goal: Absence of fever/infection during neutropenic period  Description: INTERVENTIONS:  - Monitor WBC    Outcome: Progressing     Problem: SAFETY ADULT  Goal: Maintain or return to baseline ADL function  Description: INTERVENTIONS:  -  Assess patient's ability to carry out ADLs; assess patient's baseline for ADL function and identify physical deficits which impact ability to perform ADLs (bathing, care of mouth/teeth, toileting, grooming, dressing, etc )  - Assess/evaluate cause of self-care deficits   - Assess range of motion  - Assess patient's mobility; develop plan if impaired  - Assess patient's need for assistive devices and provide as appropriate  - Encourage maximum independence but intervene and supervise when necessary  - Involve family in performance of ADLs  - Assess for home care needs following discharge   - Consider OT consult to assist with ADL evaluation and planning for discharge  - Provide patient education as appropriate  Outcome: Progressing  Goal: Maintains/Returns to pre admission functional level  Description: INTERVENTIONS:  - Perform BMAT or MOVE assessment daily    - Set and communicate daily mobility goal to care team and patient/family/caregiver  - Collaborate with rehabilitation services on mobility goals if consulted    - Reposition patient every 2 hours      - Record patient progress and toleration of activity level   Outcome: Progressing  Goal: Patient will remain free of falls  Description: INTERVENTIONS:  - Educate patient/family on patient safety including physical limitations  - Instruct patient to call for assistance with activity   - Consult OT/PT to assist with strengthening/mobility   - Keep Call bell within reach  - Keep bed low and locked with side rails adjusted as appropriate  - Keep care items and personal belongings within reach  - Initiate and maintain comfort rounds  - Make Fall Risk Sign visible to staff  - Offer Toileting every 2 Hours, in advance of need  - Initiate/Maintain bed alarm  - Obtain necessary fall risk management equipment:   - Apply yellow socks and bracelet for high fall risk patients  - Consider moving patient to room near nurses station  Outcome: Progressing     Problem: DISCHARGE PLANNING  Goal: Discharge to home or other facility with appropriate resources  Description: INTERVENTIONS:  - Identify barriers to discharge w/patient and caregiver  - Arrange for needed discharge resources and transportation as appropriate  - Identify discharge learning needs (meds, wound care, etc )  - Arrange for interpretive services to assist at discharge as needed  - Refer to Case Management Department for coordinating discharge planning if the patient needs post-hospital services based on physician/advanced practitioner order or complex needs related to functional status, cognitive ability, or social support system  Outcome: Progressing     Problem: Knowledge Deficit  Goal: Patient/family/caregiver demonstrates understanding of disease process, treatment plan, medications, and discharge instructions  Description: Complete learning assessment and assess knowledge base    Interventions:  - Provide teaching at level of understanding  - Provide teaching via preferred learning methods  Outcome: Progressing

## 2022-08-12 NOTE — LETTER
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

## 2022-08-12 NOTE — ASSESSMENT & PLAN NOTE
80 y o  F presented to [facility] on 8/12/2022 as a stroke alert  Presenting deficits: contracted and weak LUE, flaccid LLE, left facial droop, mild dysarthria, and mild sensory deficits on the left side  Upon arrival to the ED, /80 on arrival   NIHSS 11  CTA h/n revealed near occlusive thormbus at R MCA bifurcation  Not a TNK candidate due to Xarelto use the night prior; not a thrombectomy candidate per Neurosurgery  Was started on stroke protocol heparin gtt and admitted on the stroke pathway  Pertinent PMHx: Afib on Xarelto, sick sinus syndrome s/p pacemaker (not MRI conditional), HTN, and HLD      [place]:    · Lipid Panel: Cholesterol 100, LDL 38   · Hemoglobin A1c 5 4  · TTE: Left Atrium: moderately dilated  · CTH: focal area of gliosis within the high right postcentral gyrus and scattered chronic microvascular ischemic changes with more focal lucency along the anterior limb of the right internal capsule, but no acute hemorrhage  · CTA h/n: near occlusive thrombus at the right MCA bifurcation, mild beading of the mid to distal ICAs suspicious for mild fibromuscular dysplasia, and mild atherosclerotic disease at bilateral distal carotid arteries  · CTP: 8 mL mismatch  · Repeat 14 [facility] 8/12 revealed subtle loss of gray-white matter differentiation in the right temporal lobe and insular cortex in keeping with right MCA territory infarction    - Unable to get MRI as pacemaker is not compatible     Exam: brisk reflexes in LUE, spasticity in LUE, LLE, hemiplegia in LUE, LLE  Impression: Etiology remains unclear, but most likely due to Xarelto failure in the setting of known afib  No known cancer or hypercoagulable history      Plan:  · Continue Pradaxa 150 mg BID for AC (was on xarelto previously; discontinue)  · Previously on Crestor 10 mg, recommend to decrease to Crestor 5 mg (LDL 38)  · In the hospital, was on alternative pravastatin 40mg equivalent to crestor 5mg  · BP goals: normotension; cautiously restarted home meds  · Euglycemic, normothermic goal  · F/u with Neurology outpatient in 6 weeks  · F/u with PCP within 1 week of discharge

## 2022-08-12 NOTE — ED PROCEDURE NOTE
PROCEDURE  CriticalCare Time  Performed by: Radha Gunn DO  Authorized by:  Radha Gunn DO     Critical care provider statement:     Critical care time (minutes):  38    Critical care time was exclusive of:  Separately billable procedures and treating other patients and teaching time    Critical care was necessary to treat or prevent imminent or life-threatening deterioration of the following conditions:  CNS failure or compromise    Critical care was time spent personally by me on the following activities:  Blood draw for specimens, obtaining history from patient or surrogate, development of treatment plan with patient or surrogate, discussions with consultants, evaluation of patient's response to treatment, examination of patient, review of old charts, re-evaluation of patient's condition, ordering and review of radiographic studies, ordering and review of laboratory studies and ordering and performing treatments and interventions         Radha Gunn DO  08/12/22 1144

## 2022-08-12 NOTE — ASSESSMENT & PLAN NOTE
· On Xarelto 15 mg daily QHS  No missed doses  Last dose on 8/11 before going to bed    · Was on stroke protocol heparin gtt (PTT goal 50-70)  · Switched to Pradaxa since 8/15

## 2022-08-12 NOTE — ASSESSMENT & PLAN NOTE
· Upon DC, recommend Crestor 5 mg daily (previously on 10 mg at home)   · Was maintained on pravastatin 40 mg equivalent to Crestor 5 mg in the hospital

## 2022-08-12 NOTE — ED PROVIDER NOTES
History  Chief Complaint   Patient presents with   [address]     Woke up with left arm contracted and left leg paralysis, left facial droop  Patient is 80-year-old female past medical history of AFib on Eliquis, presents to the emergency department with left leg paralysis, contracted left upper extremity with weakness, left facial droop  No prior history of stroke  Patient last known normal was 11:00 p m  Yesterday  Patient was a stroke alert via EMS  Patient lives at home by herself  Patient does not drive, but all ADLs and IADLs are performed by her  NIH was performed while in route to CT scan  Prior to Admission Medications   Prescriptions Last Dose Informant Patient Reported? Taking?    Blood Pressure Monitoring (Blood Pressure Cuff) MISC  Self No No   Sig: Use every other day For HTN   cyanocobalamin 1,000 mcg/mL  Self No No   Sig: INJECT 1 ML (1,000 MCG TOTAL) INTO A MUSCLE EVERY 30 (THIRTY) DAYS   furosemide (LASIX) 20 mg tablet   No No   Sig: Take 1 tablet (20 mg total) by mouth daily   losartan (COZAAR) 100 MG tablet  Self No No   Sig: Take 1 tablet (100 mg total) by mouth daily   magnesium oxide (MAG-OX) 400 mg  Self No No   Sig: TAKE 1 TABLET (400 MG TOTAL) BY MOUTH DAILY   nebivolol (BYSTOLIC) 5 mg tablet  Self No No   Sig: Take 1 tablet (5 mg total) by mouth daily   polyvinyl alcohol (LIQUIFILM TEARS) 1 4 % ophthalmic solution  Self No No   Sig: Administer 1 drop to the right eye as needed for dry eyes   potassium chloride (MICRO-K) 10 MEQ CR capsule   No No   Sig: Take 1 capsule (10 mEq total) by mouth daily   rivaroxaban (Xarelto) 15 mg tablet  Self No No   Sig: Take 1 tablet (15 mg total) by mouth daily at bedtime   rosuvastatin (CRESTOR) 10 MG tablet   No No   Sig: TAKE 1 TABLET (10 MG TOTAL) BY MOUTH DAILY   spironolactone (ALDACTONE) 25 mg tablet  Self Yes No   Sig: Take 25 mg by mouth daily   triamcinolone (KENALOG) 0 1 % cream  Self No No   Sig: Apply topically 2 (two) times a day Apply to left arm topically 2 times daily for 1 week   verapamil (CALAN-SR) 240 mg CR tablet   No No   Sig: TAKE 1 TABLET (240 MG TOTAL) BY MOUTH DAILY AT BEDTIME      Facility-Administered Medications Last Administration Doses Remaining   cyanocobalamin injection 1,000 mcg 12/20/2021 12:38 PM           Past Medical History:   Diagnosis Date    A-fib ([address] )     Anemia     Arthritis     Breast pain, right     Chest pain on breathing     Colon polyp     Cough     Diverticulosis     Encounter for screening colonoscopy     H/O sick sinus syndrome     Heart murmur     High cholesterol     History of atrial fibrillation     Rate ontrolled  On xarelto 15mg (creatinine 50) Followed by Dr Jazz Mead with Cardiology     History of weight loss     Report loose fitting clothes  Weight stable (3lbs difference)  Last colo showed small tubular adenoma x2  Breast biopsy last showed fibrocystic changes  TSH wnl  Will check cbc, bmp, spep   Hx of long term use of blood thinners     Hypertension     Irregular heart beat     Pacemaker     Preop examination     Shortness of breath     Viral bronchitis        Past Surgical History:   Procedure Laterality Date    BREAST BIOPSY Right 08/17/2006    ultrasound guided Percutaneous Needle Core -Benign    CATARACT EXTRACTION      CATARACT EXTRACTION W/ INTRAOCULAR LENS  IMPLANT, BILATERAL      COLONOSCOPY      COLONOSCOPY N/A 5/22/2018    Procedure: COLONOSCOPY;  Surgeon: Carissa Gutierrez DO;  [facility];   Service: Gastroenterology    Bibi Kumar / Merlin Fitch / Bibi Partida Right     as a child after a fall    MAMMO STEREOTACTIC BREAST BIOPSY RIGHT (ALL INC) Right 10/2014    benign    ME CMBND ANTERPOST COLPORRAPHY W/CYSTO N/A 3/17/2016    Procedure: COLPORRHAPHY ANTERIOR POSTERIOR ;  Surgeon: Sushil Morse MD;  [facility];  Service: Gynecology    ME COLONOSCOPY FLX DX W/COLLJ SPEC WHEN PFRMD N/A 4/4/2019 Procedure: COLONOSCOPY;  Surgeon: Cameron Alicea DO;  [facility]; Service: Gastroenterology    RI CYSTOURETHROSCOPY N/A 3/17/2016    Procedure: CYSTOSCOPY;  Surgeon: Brit Hayes MD;  [facility];  Service: Gynecology    RI ESOPHAGOGASTRODUODENOSCOPY TRANSORAL DIAGNOSTIC N/A 4/16/2018    Procedure: EGD AND COLONOSCOPY;  Surgeon: Cameron Alicea DO;  [facility]; Service: Gastroenterology    RI LAP,SURG,COLECTOMY, PARTIAL, W/ANAST Right 1/13/2022    Procedure: Diagnostic laparoscopy, laparscopic right colectomy;  Surgeon: Judy Kay MD;  [facility];  Service: Colorectal    RI REVAGINAL PROLAPSE,SACROSP LIG N/A 3/17/2016    Procedure: COLPOPEXY VAGINAL EXTRAPERITONEAL (VEC) ANTERIOR ;  Surgeon: Brit Hayes MD;  [facility];  Service: Gynecology    RI SLING OPER STRES INCONTINENCE N/A 3/17/2016    Procedure: INSERTION PUBOVAGINAL SLING SINGLE INCISION ;  Surgeon: Brit Hayes MD;  [facility];  Service: Gynecology       Family History   Problem Relation Age of Onset    Diabetes Mother     Breast cancer Sister 48    No Known Problems Father     No Known Problems Maternal Grandmother     No Known Problems Maternal Grandfather     No Known Problems Paternal Grandmother     No Known Problems Paternal Grandfather     No Known Problems Daughter     No Known Problems Son     No Known Problems Sister     No Known Problems Sister     No Known Problems Brother     No Known Problems Brother     No Known Problems Sister     No Known Problems Paternal Aunt     No Known Problems Paternal Aunt     No Known Problems Paternal Aunt     No Known Problems Paternal Aunt      I have reviewed and agree with the history as documented      E-Cigarette/Vaping    E-Cigarette Use Never User      E-Cigarette/Vaping Substances    Nicotine No     THC No     CBD No     Flavoring No     Other No     Unknown No      Social History     Tobacco Use    Smoking status: Never Smoker    Smokeless tobacco: Never Used   Vaping Use    Vaping Use: Never used   Substance Use Topics    Alcohol use: Not Currently    Drug use: No        Review of Systems   Constitutional: Negative for chills and fever  HENT: Negative for ear pain and sore throat  Eyes: Negative for pain and visual disturbance  Respiratory: Negative for cough and shortness of breath  Cardiovascular: Negative for chest pain and palpitations  Gastrointestinal: Negative for abdominal pain, constipation, diarrhea, nausea and vomiting  Genitourinary: Negative for dysuria and hematuria  Musculoskeletal: Negative for arthralgias and back pain  Skin: Negative for color change and rash  Neurological: Positive for facial asymmetry, weakness and headaches  Negative for dizziness, seizures, syncope and light-headedness  All other systems reviewed and are negative  Physical Exam  ED Triage Vitals   Temperature Pulse Respirations Blood Pressure SpO2   08/12/22 0941 08/12/22 0941 08/12/22 0941 08/12/22 0943 08/12/22 0938   (!) 96 1 °F (35 6 °C) 78 18 (!) 200/80 90 %      Temp Source Heart Rate Source Patient Position - Orthostatic VS BP Location FiO2 (%)   08/12/22 0941 08/12/22 0941 08/12/22 0941 08/12/22 0941 --   Tympanic Monitor Lying Left arm       Pain Score       --                    Orthostatic Vital Signs  Vitals:    08/12/22 1230 08/12/22 1245 08/12/22 1418 08/12/22 1720   BP: 160/71 158/68 148/63 145/67   Pulse: 60 60  65   Patient Position - Orthostatic VS: Lying Lying         Physical Exam  Vitals and nursing note reviewed  Constitutional:       General: She is not in acute distress  Appearance: She is well-developed  HENT:      Head: Normocephalic and atraumatic  Mouth/Throat:      Tongue: Tongue does not deviate from midline  Comments: Left-sided facial droop  Eyes:      General: No visual field deficit       Conjunctiva/sclera: Conjunctivae normal    Cardiovascular:      Rate and Rhythm: Normal rate and regular rhythm  Heart sounds: No murmur heard  Pulmonary:      Effort: Pulmonary effort is normal  No respiratory distress  Breath sounds: Normal breath sounds  Abdominal:      Palpations: Abdomen is soft  Tenderness: There is no abdominal tenderness  Musculoskeletal:      Cervical back: Neck supple  Comments: Muscle strength right upper and right lower extremity 5/5  Muscle strength left lower extremity 0/5  Muscle strength in left upper extremity 1/5  Skin:     General: Skin is warm and dry  Neurological:      Mental Status: She is alert  GCS: GCS eye subscore is 4  GCS verbal subscore is 5  GCS motor subscore is 6  Cranial Nerves: Cranial nerve deficit, dysarthria and facial asymmetry present  Motor: Weakness present  Coordination: Finger-Nose-Finger Test abnormal and Heel to [facility] Test abnormal       Deep Tendon Reflexes: Babinski sign absent on the right side  Babinski sign present on the left side  Comments: NIH 10  No visual field deficits  No signs of nystagmus  No gaze deviation  Patient able to make fist with left hand    But unable keep left arm elevated         ED Medications  Medications   heparin (porcine) 25,000 units in 0 45% NaCl 250 mL infusion (premix) (15 Units/kg/hr × 65 kg (Order-Specific) Intravenous New Bag 8/12/22 1203)   iohexol (OMNIPAQUE) 350 MG/ML injection (MULTI-DOSE) 70 mL (70 mL Intravenous Given 8/12/22 0953)   iohexol (OMNIPAQUE) 350 MG/ML injection (MULTI-DOSE) 80 mL (80 mL Intravenous Given 8/12/22 1042)       Diagnostic Studies  Results Reviewed     Procedure Component Value Units Date/Time    HS Troponin I 4hr [[id number]]  (Normal) Collected: 08/12/22 1702    Lab Status: Final result Specimen: Blood from Hand, Left Updated: 08/12/22 1758     hs TnI 4hr 9 ng/L      Delta 4hr hsTnI 1 ng/L     APTT [[id number]]  (Abnormal) Collected: 08/12/22 1702 Lab Status: Final result Specimen: Blood from Hand, Left Updated: 08/12/22 1736     PTT 57 seconds     Protime-INR [[id number]]  (Abnormal) Collected: 08/12/22 1702    Lab Status: Final result Specimen: Blood from Hand, Left Updated: 08/12/22 1736     Protime 19 0 seconds      INR 1 57    HS Troponin I 2hr [[id number]]  (Normal) Collected: 08/12/22 1203    Lab Status: Final result Specimen: Blood from Arm, Left Updated: 08/12/22 1252     hs TnI 2hr 8 ng/L      Delta 2hr hsTnI 0 ng/L     CBC [[id number]]  (Abnormal) Collected: 08/12/22 1203    Lab Status: Final result Specimen: Blood from Arm, Left Updated: 08/12/22 1220     WBC 9 56 Thousand/uL      RBC 3 50 Million/uL      Hemoglobin 10 4 g/dL      Hematocrit 33 0 %      MCV 94 fL      MCH 29 7 pg      MCHC 31 5 g/dL      RDW 14 5 %      Platelets 003 Thousands/uL      MPV 10 7 fL     HS Troponin 0hr (reflex protocol) [[id number]]  (Normal) Collected: 08/12/22 0940    Lab Status: Final result Specimen: Blood from Arm, Right Updated: 08/12/22 1125     hs TnI 0hr 8 ng/L     FLU/RSV/COVID - if FLU/RSV clinically relevant [[id number]]  (Normal) Collected: 08/12/22 1004    Lab Status: Final result Specimen: Nares from Nose Updated: 08/12/22 1057     SARS-CoV-2 Negative     INFLUENZA A PCR Negative     INFLUENZA B PCR Negative     RSV PCR Negative    Narrative:      FOR PEDIATRIC PATIENTS - copy/paste COVID Guidelines URL to browser: [email or web address] [email or web address]  [email or web address]    SARS-CoV-2 assay is a Nucleic Acid Amplification assay intended for the  qualitative detection of nucleic acid from SARS-CoV-2 in nasopharyngeal  swabs  Results are for the presumptive identification of SARS-CoV-2 RNA  Positive results are indicative of infection with SARS-CoV-2, the virus  causing COVID-19, but do not rule out bacterial infection or co-infection  with other viruses   Laboratories within the [place] and its  territories are required to report all positive results to the appropriate  public health authorities  Negative results do not preclude SARS-CoV-2  infection and should not be used as the sole basis for treatment or other  patient management decisions  Negative results must be combined with  clinical observations, patient history, and epidemiological information  This test has not been FDA cleared or approved  This test has been authorized by FDA under an Emergency Use Authorization  (EUA)  This test is only authorized for the duration of time the  declaration that circumstances exist justifying the authorization of the  emergency use of an in vitro diagnostic tests for detection of SARS-CoV-2  virus and/or diagnosis of COVID-19 infection under section 564(b)(1) of  the Act, 21 U  S C  512JDQ-7(C)(5), unless the authorization is terminated  or revoked sooner  The test has been validated but independent review by FDA  and CLIA is pending  Test performed using [employer] Gene[employer]pert: This RT-PCR assay targets N2,  a region unique to SARS-CoV-2  A conserved region in the E-gene was chosen  for pan-Sarbecovirus detection which includes SARS-CoV-2      Basic metabolic panel [[id number]]  (Abnormal) Collected: 08/12/22 0940    Lab Status: Final result Specimen: Blood from Arm, Right Updated: 08/12/22 1021     Sodium 139 mmol/L      Potassium 4 1 mmol/L      Chloride 109 mmol/L      CO2 23 mmol/L      ANION GAP 7 mmol/L      BUN 18 mg/dL      Creatinine 1 07 mg/dL      Glucose 118 mg/dL      Calcium 9 5 mg/dL      eGFR 48 ml/min/1 73sq m     Narrative:      Renata guidelines for Chronic Kidney Disease (CKD):     Stage 1 with normal or high GFR (GFR > 90 mL/min/1 73 square meters)    Stage 2 Mild CKD (GFR = 60-89 mL/min/1 73 square meters)    Stage 3A Moderate CKD (GFR = 45-59 mL/min/1 73 square meters)    Stage 3B Moderate CKD (GFR = 30-44 mL/min/1 73 square meters)    Stage 4 Severe CKD (GFR = 15-29 mL/min/1 73 square meters)    Stage 5 End Stage CKD (GFR <15 mL/min/1 73 square meters)  Note: GFR calculation is accurate only with a steady state creatinine    Protime-INR [[id number]]  (Abnormal) Collected: 08/12/22 0940    Lab Status: Final result Specimen: Blood from Arm, Right Updated: 08/12/22 1006     Protime 21 7 seconds      INR 1 86    APTT [[id number]]  (Normal) Collected: 08/12/22 0940    Lab Status: Final result Specimen: Blood from Arm, Right Updated: 08/12/22 1006     PTT 29 seconds     Fingerstick Glucose (POCT) [[id number]]  (Normal) Collected: 08/12/22 0953    Lab Status: Final result Updated: 08/12/22 0957     POC Glucose 129 mg/dl     CBC and Platelet [[id number]]  (Abnormal) Collected: 08/12/22 0940    Lab Status: Final result Specimen: Blood from Arm, Right Updated: 08/12/22 0949     WBC 11 30 Thousand/uL      RBC 3 76 Million/uL      Hemoglobin 11 3 g/dL      Hematocrit 35 8 %      MCV 95 fL      MCH 30 1 pg      MCHC 31 6 g/dL      RDW 14 5 %      Platelets 786 Thousands/uL      MPV 11 1 fL                  CT cerebral perfusion   Final Result by Malissa Doherty MD (08/12 1108)      CT perfusion performed  Data available on PACS  Workstation performed: [id number]         CTA stroke alert (head/neck)   Final Result by Malissa Doherty MD (08/12 1032)      Near occlusive thrombus at the right MCA bifurcation  Mild beading of the mid to distal internal carotid arteries suspicious for mild fibromuscular dysplasia  Atherosclerotic plaque at the carotid bulbs without evidence for high-grade stenosis or focal occlusion of the cervical vasculature  Partially imaged pulmonary edema and layering bilateral pleural effusions        Findings were directly discussed with Jennifer Ashford at 10:00 AM                         Workstation performed: [id number]         CT stroke alert brain   Final Result by Malissa Doherty MD (08/12 1021)      No definite CT evidence for large acute vascular distribution infarct or acute intracranial hemorrhage  Findings were directly discussed with Ayad Cheney at 10:00 AM          Workstation performed: [id number]               Procedures  Procedures      ED Course  ED Course as of 08/12/22 1835   Fri Aug 12, 2022   1044 Patient seen, examined, orders placed  Patient got CT, CT a, CT perfusion  NIH stroke scale of  10  Neuro currently with patient  1104 CT perfusion study ordered  Neurology, Dr Leida Hopkins, says to administer heparin gtt, control bp to under 912 systolic  Pt is not a candidate for TPA or thrombectomy  200 CT stroke alert brain  IMPRESSION:     No definite CT evidence for large acute vascular distribution infarct or acute intracranial hemorrhage      Findings were directly discussed with Ayad Cheney at 10:00 AM         Workstation performed: [id number]   4973 CTA stroke alert (head/neck)  IMPRESSION:     Near occlusive thrombus at the right MCA bifurcation      Mild beading of the mid to distal internal carotid arteries suspicious for mild fibromuscular dysplasia      Atherosclerotic plaque at the carotid bulbs without evidence for high-grade stenosis or focal occlusion of the cervical vasculature      Partially imaged pulmonary edema and layering bilateral pleural effusions      Findings were directly discussed with Ayad Cheney at 10:00 AM                        Workstation performed: [id number]   0290 CT cerebral perfusion  FINDINGS:     Hemisphere affected:  Right      Total CBF<30% volume:  0 mL  Total Tmax>6 0s volume:  8 mL  Total Mismatch difference:  8 mL  Total Mismatch ratio:  Infinite  Hypoperfusion Index (Tmax>10s/Tmax  6s): [HIR]     Additional comments: There is a total Tmax of greater than 4 seconds measuring 34 mm     IMPRESSION:     CT perfusion performed  Data available on PACS     1123 Procedure Note: EKG  Date/Time: 08/12/22 11:27 AM   Interpreted by: Yesi Bowers  Indications / Diagnosis: stroke alert  ECG reviewed by me, the ED Provider: yes   The EKG demonstrates:  Rate: 67 BPM  Rhythm: normal sinus  Intervals: normal intervals  Axis: left axis  QRS/Blocks: wide QRS  ST Changes: No acute ST Changes, no STD/GARRY  Ventricular paced     1153 Pt admitted to ICU, step 1               Identification of Seniors at [address] [place] [address] Most Recent Value   (ISAR) Identification of Seniors at Risk    Before the illness or injury that brought you to the Emergency, did you need someone to help you on a regular basis? 0 Filed at: 08/12/2022 1034   In the last 24 hours, have you needed more help than usual? 0 Filed at: 08/12/2022 1034   Have you been hospitalized for one or more nights during the past 6 months? 0 Filed at: 08/12/2022 1034   In general, do you see well? 0 Filed at: 08/12/2022 1034   In general, do you have serious problems with your memory? 0 Filed at: 08/12/2022 1034   Do you take more than three different medications every day? 1 Filed at: 08/12/2022 1034   ISAR Score 1 Filed at: 08/12/2022 1034         Stroke Assessment     Row Name 08/12/22 1101             NIH Stroke Scale    Interval Other (Comment)  LKN 2300 yesterday      Level of Consciousness (1a ) 0      LOC Questions (1b ) 0      LOC Commands (1c ) 0      Best Gaze (2 ) 0      Visual (3 ) 0      Facial Palsy (4 ) 2      Motor Arm, Left (5a ) 3      Motor Arm, Right (5b ) 0      Motor Leg, Left (6a ) 4      Motor Leg, Right (6b ) 0      Limb Ataxia (7 ) 0      Sensory (8 ) 0      Best Language (9 ) 0      Dysarthria (10 ) 1      Extinction and Inattention (11 ) (Formerly Neglect) 0      Total 10              Flowsheet Row Most Recent Value   Thrombolytic Decision Options    Thrombolytic Decision Patient not a candidate  Patient is not a candidate options Bleeding risk                              MDM  Number of Diagnoses or Management Options  Acute CVA (cerebrovascular accident) [facility])  Essential hypertension  Diagnosis management comments: Patient is an 59-year-old female, past medical history AFib on Eliquis, who presents with new onset stroke symptoms  Patient not a candidate for tPA or thrombectomy  Will start heparin drip and admit  Will control blood pressure as needed  Disposition  Final diagnoses:   Essential hypertension   [facility]     Time reflects when diagnosis was documented in both MDM as applicable and the Disposition within this note     Time User Action Codes Description Comment    8/12/2022  9:20 AM Edwina Peacock Add [I10] Essential hypertension     8/12/2022 11:07 AM Audrea Lively Add [I63 9] Acute CVA (cerebrovascular accident) ([facility] )     8/12/2022 11:07 AM Audrea Lively Modify [I10] Essential hypertension     8/12/2022 11:07 AM Audrea Lively Modify [I63 9] Acute CVA (cerebrovascular accident) ([facility] )     8/12/2022  1:47 PM Juan Manuel Drake [I63 30] 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 [facility])       ED Disposition     ED Disposition   Admit    Condition   Stable    Date/Time   Fri Aug 12, 2022 11:35 AM    Comment   Case was discussed with Dr Mel Deal and the patient's admission status was agreed to be Admission Status: inpatient status to the service of Dr Nichols Failing              Follow-up Information    None         Current Discharge Medication List      CONTINUE these medications which have NOT CHANGED    Details   Blood Pressure Monitoring (Blood Pressure Cuff) MISC Use every other day For HTN  Qty: 1 each, Refills: 0    Associated Diagnoses: Essential hypertension      cyanocobalamin 1,000 mcg/mL INJECT 1 ML (1,000 MCG TOTAL) INTO A MUSCLE EVERY 30 (THIRTY) DAYS  Qty: 3 mL, Refills: 10    Associated Diagnoses: B12 deficiency      furosemide (LASIX) 20 mg tablet Take 1 tablet (20 mg total) by mouth daily  Qty: 90 tablet, Refills: 3    Associated Diagnoses: Edema, unspecified type      losartan (COZAAR) 100 MG tablet Take 1 tablet (100 mg total) by mouth daily  Qty: 90 tablet, Refills: 0    Associated Diagnoses: Essential hypertension      magnesium oxide (MAG-OX) 400 mg TAKE 1 TABLET (400 MG TOTAL) BY MOUTH DAILY  Qty: 30 tablet, Refills: 2    Associated Diagnoses: Hypomagnesemia      nebivolol (BYSTOLIC) 5 mg tablet Take 1 tablet (5 mg total) by mouth daily  Qty: 90 tablet, Refills: 3    Associated Diagnoses: Hypertension, unspecified type      polyvinyl alcohol (LIQUIFILM TEARS) 1 4 % ophthalmic solution Administer 1 drop to the right eye as needed for dry eyes  Qty: 15 mL, Refills: 0    Associated Diagnoses: Hordeolum externum of right upper eyelid      potassium chloride (MICRO-K) 10 MEQ CR capsule Take 1 capsule (10 mEq total) by mouth daily  Qty: 90 capsule, Refills: 3    Associated Diagnoses: Edema, unspecified type      rivaroxaban (Xarelto) 15 mg tablet Take 1 tablet (15 mg total) by mouth daily at bedtime    Associated Diagnoses: Chronic atrial fibrillation (HCC)      rosuvastatin (CRESTOR) 10 MG tablet TAKE 1 TABLET (10 MG TOTAL) BY MOUTH DAILY  Qty: 90 tablet, Refills: 0    Associated Diagnoses: Hyperlipidemia LDL goal <100      spironolactone (ALDACTONE) 25 mg tablet Take 25 mg by mouth daily      triamcinolone (KENALOG) 0 1 % cream Apply topically 2 (two) times a day Apply to left arm topically 2 times daily for 1 week  Qty: 28 4 g, Refills: 1    Associated Diagnoses: Rash      verapamil (CALAN-SR) 240 mg CR tablet TAKE 1 TABLET (240 MG TOTAL) BY MOUTH DAILY AT BEDTIME  Qty: 90 tablet, Refills: 3    Associated Diagnoses: Essential hypertension           No discharge procedures on file  PDMP Review       Value Time User    PDMP Reviewed  Yes 1/17/2022  9:16 AM Katherine Hsu PA-C           ED Provider  Attending physically available and evaluated Amorane Olszewski I managed the patient along with the ED Attending      Electronically Signed by         Aviva Daniels DO  08/12/22 6360

## 2022-08-12 NOTE — H&P
H&P Exam - Critical Care   Satish Lima 80 y o  female MRN: [id number]  [facility] Encounter: [id number]      -------------------------------------------------------------------------------------------------------------  Chief Complaint: left-sided weakness, left-sided facial droop, dysarthria, and sensory loss    History of Present Illness   HX and PE limited by: Gia Earl is a 80 y o  female with history of A-fib on Xarelto, SSS s/p pacemaker (not MRI conditional), HTN, hyperlipidemia  She presented to the ED on morning of 8/12/22 as a stroke alert due to the above mentioned symptoms  Patient was last known normal at 2300 on 8/11/22  She woke up morning of 8/12/22 with left arm contracted, left leg weakness, and left facial droop  She was able to speak to her son who called EMS  On arrival to the ED, NIHSS was 11  She was found to have a near-occlusive thrombus of the right MCA at the bifurcation, mild beading of the mid-distal ICAs, and mild atherosclerosis  CT perfusion notable for 0 cc core, 8 cc penumbra with infinite mismatch  Patient was not a tPA candidate due to Xarelto use  Endovascular team evaluated case and determined that she was not a candidate for thrombectomy  She was started on heparin gtt stroke pathway and admitted to the CC service step down unit  History obtained from chart review and the patient   -------------------------------------------------------------------------------------------------------------  Assessment and Plan:    Neuro:    Diagnosis: Acute embolic ischemic right MCA stroke, likely due to A-fib in the setting of Xarelto compliance  May be a non-responder  o Stroke pathway  o Neuro checks q2h  o Currently on heparin gtt stroke protocol   Goal PTT 50-70  o Obtain updated CTH once PTT at goal x 2 or in the AM, whichever comes first  o Unable to obtain MRI due to pacemaker not compatible  o Check hgba1c and lipids  o Echo  o Monitor on telemetry  o Neurology consulted  Appreciate recommendations  o SBP goal 150-180  o Atorvastatin 40 mg daily  o Euglycemia and normothermia  o PT/OT/Speech/PMR  o Stat CTH for change in GCS > 2 over 1 hour   Diagnosis: Possible fibromuscular dysplasia in bilateral ICA's  - Radiographic finding  - Consider additional imaging/angio if warranted      CV:    Diagnosis: Chronic A-fib on home Xarelto  Has not missed any doses and had embolic stroke  Consider Xarelto failure  o D/c home Xarelto  o Continue heparin gtt as outlined above  o Will need to consider transitioning to a different DOAC  Price check will be needed   Diagnosis: Hypertension  o Home meds: Lasix 20 mg qd, Losartan 100 mg qd, Nebivolol 5 mg qd, spironolactone 25 mg qd, verapamil 240 mg qd  o Presenting /80  o Home medications currently on hold due to permissive HTN  o SBP goal 150-180   Diagnosis: Hyperlipidemia  o Home meds: Crestor 10 mg  o Change to high-intensity statin, atorvastatin 40 mg daily  o Will update lipid panel   Diagnosis: Tachy-tasha syndrome  o Pacemaker present that is not MRI compatible      Pulm:   No active issues   Currently on 3 L/min per NC for comfort  Will discontinue   Incentive spirometry      GI:    No acute issues   Passed bedside swallow and diet ordered      :    No acute issues      F/E/N:    Fluids: none   E+: Replete to maintain K > 4, Mag > 2, Phos > 3   Nutrition: Regular diet, thin liquids      Heme/Onc:    Diagnosis: Mild chronic anemia since Jan 2022   Hgb runs in the 10 5-11 5 range  o No acute interventions  o Monitor clinically      Endo:    No acute issues      ID:    No acute issues      MSK/Skin:    Diagnosis: Left-sided weakness post-stroke  o Will need PT/OT/Speech/PMR      Disposition: Admit to Stepdown Level 1  Code Status: Level 1 - Full Code  --------------------------------------------------------------------------------------------------------------  Review of Systems Constitutional: Negative for activity change, appetite change, chills, fatigue and fever  HENT: Negative for ear pain and sore throat  Eyes: Negative for pain and visual disturbance  Respiratory: Negative for cough and shortness of breath  Cardiovascular: Negative for chest pain and palpitations  Gastrointestinal: Negative for abdominal pain and vomiting  Genitourinary: Negative for dysuria and hematuria  Musculoskeletal: Negative for arthralgias and back pain  Skin: Negative for color change and rash  Neurological: Positive for facial asymmetry, speech difficulty, weakness and numbness  Negative for dizziness, tremors, seizures, syncope, light-headedness and headaches  Psychiatric/Behavioral: Negative for confusion and dysphoric mood  All other systems reviewed and are negative  A 12-point, complete review of systems was reviewed and negative except as stated above     Physical Exam  Vitals and nursing note reviewed  Constitutional:       General: She is not in acute distress  Appearance: Normal appearance  She is normal weight  She is not ill-appearing  HENT:      Head: Normocephalic and atraumatic  Right Ear: External ear normal       Left Ear: External ear normal       Nose: Nose normal       Mouth/Throat:      Mouth: Mucous membranes are moist       Pharynx: Oropharynx is clear  Eyes:      General: No scleral icterus  Extraocular Movements: Extraocular movements intact  Conjunctiva/sclera: Conjunctivae normal       Pupils: Pupils are equal, round, and reactive to light  Cardiovascular:      Rate and Rhythm: Normal rate  Rhythm irregularly irregular  Pulses: Normal pulses  Heart sounds: Normal heart sounds  Pulmonary:      Effort: Pulmonary effort is normal  No respiratory distress  Breath sounds: Normal breath sounds  Abdominal:      General: Abdomen is flat  Bowel sounds are normal  There is no distension        Palpations: Abdomen is soft    Musculoskeletal:      Cervical back: Neck supple  No rigidity  Right lower leg: No edema  Left lower leg: No edema  Skin:     General: Skin is warm and dry  Neurological:      Mental Status: She is alert and oriented to person, place, and time  Comments: Patient is awake, alert, and oriented x 3  Cooperative with exam and can follow multi-step commands  Speech is mildly dysarthric    Language is intact  PERRL, EOMI  Mild left lower facial droop  Hearing intact  Tongue protrusion is midline  Uvula and soft palate elevates symmetrically  Motor: Full strength in RUE and RLE  LUE is held in a contracted position close to body  Strength is 0/5 in the LUE  The LLE is flaccid with 1/5 strength  Decreased sensation to light touch on the left side of body including arm and leg  Coordination is intact on right, unable to test on left  Psychiatric:         Attention and Perception: Attention and perception normal          Mood and Affect: Mood normal          Behavior: Behavior normal        --------------------------------------------------------------------------------------------------------------  Vitals:   Vitals:    08/12/22 1720 08/12/22 1730 08/12/22 1930 08/12/22 1932   BP: 145/67 145/67  152/64   BP Location:       Pulse: 65 67 70 70   Resp: 19 19 19   Temp: 98 3 °F (36 8 °C)  98 °F (36 7 °C) 98 °F (36 7 °C)   TempSrc:       SpO2: 95% 96% 94% 93%   Weight:         Temp  Min: 96 1 °F (35 6 °C)  Max: 98 3 °F (36 8 °C)        Body mass index is 25 81 kg/m²    N/A    Laboratory and Diagnostics:  Results from last 7 days   Lab Units 08/12/22  1203 08/12/22  0940   WBC Thousand/uL 9 56 11 30*   HEMOGLOBIN g/dL 10 4* 11 3*   HEMATOCRIT % 33 0* 35 8   PLATELETS Thousands/uL 212 231     Results from last 7 days   Lab Units 08/12/22  0940   SODIUM mmol/L 139   POTASSIUM mmol/L 4 1   CHLORIDE mmol/L 109*   CO2 mmol/L 23   ANION GAP mmol/L 7   BUN mg/dL 18   CREATININE mg/dL 1 07   CALCIUM mg/dL 9 5 GLUCOSE RANDOM mg/dL 118          Results from last 7 days   Lab Units 08/12/22  1702 08/12/22  0940   INR  1 57* 1 86*   PTT seconds 50*  57* 29              ABG:    VBG:          Micro:        EKG: Paced rhythm with occasional PACs  Imaging: I have personally reviewed pertinent reports  and I have personally reviewed pertinent films in PACS     CT stroke alert brain  Result Date: 8/12/2022  Impression: No definite CT evidence for large acute vascular distribution infarct or acute intracranial hemorrhage  Findings were directly discussed with Tamra Lacey at 10:00 AM  Workstation performed: [id number]     CT cerebral perfusion  Result Date: 8/12/2022  Impression: CT perfusion performed  Data available on PACS  Workstation performed: [id number]     CTA stroke alert (head/neck)  Result Date: 8/12/2022  Impression: Near occlusive thrombus at the right MCA bifurcation  Mild beading of the mid to distal internal carotid arteries suspicious for mild fibromuscular dysplasia  Atherosclerotic plaque at the carotid bulbs without evidence for high-grade stenosis or focal occlusion of the cervical vasculature  Partially imaged pulmonary edema and layering bilateral pleural effusions  Findings were directly discussed with Tamra Lacey at 10:00 AM  Workstation performed: [id number]       Historical Information   Past Medical History:   Diagnosis Date    A-fib ([address] )     Anemia     Arthritis     Breast pain, right     Chest pain on breathing     Colon polyp     Cough     Diverticulosis     Encounter for screening colonoscopy     H/O sick sinus syndrome     Heart murmur     High cholesterol     History of atrial fibrillation     Rate ontrolled  On xarelto 15mg (creatinine 50) Followed by Dr Jad Robertson with Cardiology     History of weight loss     Report loose fitting clothes  Weight stable (3lbs difference)  Last colo showed small tubular adenoma x2  Breast biopsy last showed fibrocystic changes   TSH wnl  Will check cbc, bmp, spep   Hx of long term use of blood thinners     Hypertension     Irregular heart beat     Pacemaker     Preop examination     Shortness of breath     Viral bronchitis      Past Surgical History:   Procedure Laterality Date    BREAST BIOPSY Right 08/17/2006    ultrasound guided Percutaneous Needle Core -Benign    CATARACT EXTRACTION      CATARACT EXTRACTION W/ INTRAOCULAR LENS  IMPLANT, BILATERAL      COLONOSCOPY      COLONOSCOPY N/A 5/22/2018    Procedure: COLONOSCOPY;  Surgeon: Titi Barron DO;  [facility]; Service: Gastroenterology    Amira Funk / Princess Harrington / Dhruv Ayush Right     as a child after a fall    MAMMO STEREOTACTIC BREAST BIOPSY RIGHT (ALL INC) Right 10/2014    benign    MO CMBND ANTERPOST COLPORRAPHY W/CYSTO N/A 3/17/2016    Procedure: COLPORRHAPHY ANTERIOR POSTERIOR ;  Surgeon: Kavita Espino MD;  [facility];  Service: Gynecology    MO COLONOSCOPY FLX DX W/[facility] WHEN PFRMD N/A 4/4/2019    Procedure: COLONOSCOPY;  Surgeon: Titi Barron DO;  [facility]; Service: Gastroenterology    MO CYSTOURETHROSCOPY N/A 3/17/2016    Procedure: CYSTOSCOPY;  Surgeon: Kavita Espino MD;  [facility];  Service: Gynecology    MO ESOPHAGOGASTRODUODENOSCOPY TRANSORAL DIAGNOSTIC N/A 4/16/2018    Procedure: EGD AND COLONOSCOPY;  Surgeon: Titi Barron DO;  [facility];   Service: Gastroenterology    MO LAP,SURG,COLECTOMY, PARTIAL, W/ANAST Right 1/13/2022    Procedure: Diagnostic laparoscopy, laparscopic right colectomy;  Surgeon: Samy Rudd MD;  [facility];  Service: Colorectal    MO REVAGINAL PROLAPSE,SACROSP LIG N/A 3/17/2016    Procedure: COLPOPEXY VAGINAL EXTRAPERITONEAL (VEC) ANTERIOR ;  Surgeon: Kavita Espino MD;  [facility];  Service: Gynecology    MO SLING OPER STRES INCONTINENCE N/A 3/17/2016    Procedure: INSERTION PUBOVAGINAL SLING SINGLE INCISION ;  Surgeon: Armando Correa MD;  [facility];  Service: Gynecology     Social History   Social History     Substance and Sexual Activity   Alcohol Use Not Currently     Social History     Substance and Sexual Activity   Drug Use No     Social History     Tobacco Use   Smoking Status Never Smoker   Smokeless Tobacco Never Used     Family History:   Family History   Problem Relation Age of Onset    Diabetes Mother     Breast cancer Sister 48    No Known Problems Father     No Known Problems Maternal Grandmother     No Known Problems Maternal Grandfather     No Known Problems Paternal Grandmother     No Known Problems Paternal Grandfather     No Known Problems Daughter     No Known Problems Son     No Known Problems Sister     No Known Problems Sister     No Known Problems Brother     No Known Problems Brother     No Known Problems Sister     No Known Problems Paternal Aunt     No Known Problems Paternal Aunt     No Known Problems Paternal Aunt     No Known Problems Paternal Aunt      I have reviewed this patient's family history and commented on sigificant items within the HPI      Medications:  Current Facility-Administered Medications   Medication Dose Route Frequency    heparin (porcine) 25,000 units in 0 45% NaCl 250 mL infusion (premix)  3-24 Units/kg/hr (Order-Specific) Intravenous Titrated     Home medications:  Prior to Admission Medications   Prescriptions Last Dose Informant Patient Reported? Taking?    Blood Pressure Monitoring (Blood Pressure Cuff) MISC  Self No No   Sig: Use every other day For HTN   cyanocobalamin 1,000 mcg/mL  Self No No   Sig: INJECT 1 ML (1,000 MCG TOTAL) INTO A MUSCLE EVERY 30 (THIRTY) DAYS   furosemide (LASIX) 20 mg tablet   No No   Sig: Take 1 tablet (20 mg total) by mouth daily   losartan (COZAAR) 100 MG tablet  Self No No   Sig: Take 1 tablet (100 mg total) by mouth daily   magnesium oxide (MAG-OX) 400 mg  Self No No   Sig: TAKE 1 TABLET (400 MG TOTAL) BY MOUTH DAILY   nebivolol (BYSTOLIC) 5 mg tablet  Self No No   Sig: Take 1 tablet (5 mg total) by mouth daily   polyvinyl alcohol (LIQUIFILM TEARS) 1 4 % ophthalmic solution  Self No No   Sig: Administer 1 drop to the right eye as needed for dry eyes   potassium chloride (MICRO-K) 10 MEQ CR capsule   No No   Sig: Take 1 capsule (10 mEq total) by mouth daily   rivaroxaban (Xarelto) 15 mg tablet  Self No No   Sig: Take 1 tablet (15 mg total) by mouth daily at bedtime   rosuvastatin (CRESTOR) 10 MG tablet   No No   Sig: TAKE 1 TABLET (10 MG TOTAL) BY MOUTH DAILY   spironolactone (ALDACTONE) 25 mg tablet  Self Yes No   Sig: Take 25 mg by mouth daily   triamcinolone (KENALOG) 0 1 % cream  Self No No   Sig: Apply topically 2 (two) times a day Apply to left arm topically 2 times daily for 1 week   verapamil (CALAN-SR) 240 mg CR tablet   No No   Sig: TAKE 1 TABLET (240 MG TOTAL) BY MOUTH DAILY AT BEDTIME      Facility-Administered Medications Last Administration Doses Remaining   cyanocobalamin injection 1,000 mcg 12/20/2021 12:38 PM         Allergies: Allergies   Allergen Reactions    Other Itching     Bandaids    Tape [Medical Tape] Itching     ------------------------------------------------------------------------------------------------------------  Advance Directive and Living Will:      Power of :    POLST:    ------------------------------------------------------------------------------------------------------------  Anticipated Length of Stay is > 2 midnights    Care Time Delivered:   Please refer to attending's attestation      Estee Godfrey, DO        Portions of the record may have been created with voice recognition software  Occasional wrong word or "sound a like" substitutions may have occurred due to the inherent limitations of voice recognition software    Read the chart carefully and recognize, using context, where substitutions have occurred

## 2022-08-12 NOTE — ASSESSMENT & PLAN NOTE
· CTA head/neck - near occlusive thrombus at the R MCA bifurcation, mild beading of the mid to distal ICAs suspicious for mild fibromuscular dysplasia, and mild atherosclerotic disease at bilateral distal carotid arteries      · Switch AC to Pradaxa on 8/15  · Will need CTA h/n in 3 months to re-evaluate thrombus as outpatient

## 2022-08-12 NOTE — ASSESSMENT & PLAN NOTE
· Continue home meds (Verapamil 240 mg QHS, Spirono 25 mg QD, Cozaar 100 mg QD, Lasix 20 mg QD)   · Advise regular f/u with PCP as outpatient

## 2022-08-12 NOTE — CONSULTS
Consultation - Stroke   Enrike Alvarado 80 y o  female MRN: [id number]  Unit/Bed#:  Encounter: [id number]      Assessment/Plan     * Acute cerebrovascular accident (CVA) [facility])  Assessment & Plan  80 y o   female with Afib on Xarelto, sick sinus syndrome s/p pacemaker (not MRI conditional), HTN, and HLD who presented to [facility] on 8/12/2022 as a stroke alert for contracted and weak L UE, flaccid L LE, left facial droop, mild dysarthria, and mild sensory deficits on the left side  · Upon arrival to the ED, /80 on arrival   NIHSS 11     · CT head shows focal area of gliosis within the high right postcentral gyrus and scattered chronic microvascular ischemic changes with more focal lucency along the anterior limb of the right internal capsule, but no acute hemorrhage  · CTA head/neck shows near occlusive thrombus at the right MCA bifurcation, mild beading of the mid to distal ICAs suspicious for mild fibromuscular dysplasia, and mild atherosclerotic disease at bilateral distal carotid arteries  · CT cerebral perfusion shows 8 mL mismatch  · Patient was not a tPA candidate due to bleeding risk from taking Xarelto last night  · Attending Neurologist discussed case with Dr Joleen Bruner in [facility] Neurosurgery, and patient is not a thrombectomy candidate  · Patient was started on stroke protocol heparin gtt  Etiology for stroke remains unclear, but concern for Afib with Xarelto failure (no missed doses of Xarelto)  No known cancer or other hypercoagulable history  Plan:  - Stroke pathway   Patient cannot get MRI brain due to pacemaker not being MRI conditional; Recommend repeat CT head tomorrow (8/13)   Echo pending   Lipid Panel pending   Hemoglobin A1c pending   Started on stroke protocol heparin gtt (PTT goal 50-70); hold home Xarelto    Patient may need to transition to alternate anticoagulant due to concern for Xarelto failure   Atorvastatin 40 mg (patient was previously taking Crestor 10 mg at home)   Permissive HTN, labetalol if SBP >180 (lower SBP goal due to heparin gtt)   Euglycemic, normothermic goal   Continue telemetry   PT/OT/ST   Stroke education   Frequent neuro checks  Continue to monitor and notify neurology with any changes   STAT CT head for any acute change in neuro exam  - Medical management and supportive care per primary team  Correction of any metabolic or infectious disturbances  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 [facility])  Assessment & Plan  - CTA head/neck shows near occlusive thrombus at the right MCA bifurcation, mild beading of the mid to distal ICAs suspicious for mild fibromuscular dysplasia, and mild atherosclerotic disease at bilateral distal carotid arteries  - Attending Neurologist discussed the case with Neuro IR, and no mechanical thrombectomy warranted at this time  - Patient started on stroke protocol heparin gtt    Chronic atrial fibrillation (HCC)  Assessment & Plan  - On Xarelto 15 mg daily QHS  No missed doses  Last dose on 8/11 before going to bed  - Currently on stroke protocol heparin gtt (PTT goal 50-70)  - May need to consider transitioning to a different anticoagulant due to concern for Xarelto failure    Hyperlipidemia LDL goal <100  Assessment & Plan  - Started on Lipitor 40 mg daily (previously taking Crestor)    Essential hypertension  Assessment & Plan  - Permissive HTN at this time, with SBP goal <160-180      Thrombolytic Decision: Patient not a candidate  Taking Xarelto at home with last dose last night      Recommendations for outpatient neurological follow up have yet to be determined      History of Present Illness     Reason for Consult / Principal Problem: stroke alert; left sided weakness  Hx and PE limited by: N/A  Patient last known well: 11 PM last night  Stroke alert called: [facility]  Neurology time of arrival: immediate  HPI: William Winston is a 80 y o   female with Afib on Xarelto, sick sinus syndrome s/p pacemaker (not MRI conditional), HTN, and HLD who presented to [facility] on 8/12/2022 as a stroke alert for contracted and weak L UE, flaccid L LE, left facial droop, mild dysarthria, and mild sensory deficits on the left side  Patient has Afib and takes Xarelto  Last known normal at 11 PM last night when going to bed  This morning around 6:30 AM, patient woke up and the left arm was contracted without any movement and the left leg was flaccid  She also had a left facial droop  Per EMS, BP normotensive  Upon arrival to the ED, /80 on arrival   NIHSS 11   CT head shows focal area of gliosis within the high right postcentral gyrus and scattered chronic microvascular ischemic changes with more focal lucency along the anterior limb of the right internal capsule, but no acute hemorrhage  CTA head/neck shows near occlusive thrombus at the right MCA bifurcation, mild beading of the mid to distal ICAs suspicious for mild fibromuscular dysplasia, and mild atherosclerotic disease at bilateral distal carotid arteries  CT cerebral perfusion shows 8 mL mismatch  Patient was not a tPA candidate due to bleeding risk from taking Xarelto last night  Attending Neurologist discussed case with Dr Terrence Solano with IR Neurosurgery, and patient is not a thrombectomy candidate  Patient was started on stroke protocol heparin gtt  At baseline, patient is independent with all ADLs  She lives at home alone  She no longer drives, but can do her own grocery shopping, pays her bills, cooks, and cleans  No missed doses of her Xarelto, with last dose around bedtime yesterday  Consult to Neurology  Consult performed by: Dayanna Rivas PA-C  Consult ordered by:  Guanako Sandoval DO          Review of Systems   Unable to perform ROS: Acuity of condition       Historical Information   Past Medical History:   Diagnosis Date    A-fib ([facility] [address] )     Anemia     Arthritis     Breast pain, right     Chest pain on breathing  Colon polyp     Cough     Diverticulosis     Encounter for screening colonoscopy     H/O sick sinus syndrome     Heart murmur     High cholesterol     History of atrial fibrillation     Rate ontrolled  On xarelto 15mg (creatinine 50) Followed by Dr Pankaj Jean-Baptiste with Cardiology     History of weight loss     Report loose fitting clothes  Weight stable (3lbs difference)  Last colo showed small tubular adenoma x2  Breast biopsy last showed fibrocystic changes  TSH wnl  Will check cbc, bmp, spep   Hx of long term use of blood thinners     Hypertension     Irregular heart beat     Pacemaker     Preop examination     Shortness of breath     Viral bronchitis      Past Surgical History:   Procedure Laterality Date    BREAST BIOPSY Right 08/17/2006    ultrasound guided Percutaneous Needle Core -Benign    CATARACT EXTRACTION      CATARACT EXTRACTION W/ INTRAOCULAR LENS  IMPLANT, BILATERAL      COLONOSCOPY      COLONOSCOPY N/A 5/22/2018    Procedure: COLONOSCOPY;  Surgeon: Kristy Deleon DO;  [facility]; Service: Gastroenterology    Ace Tucker / Kallie Primus / Lou Tobar Right     as a child after a fall    MAMMO STEREOTACTIC BREAST BIOPSY RIGHT (ALL INC) Right 10/2014    benign    NC CMBND ANTERPOST COLPORRAPHY W/CYSTO N/A 3/17/2016    Procedure: COLPORRHAPHY ANTERIOR POSTERIOR ;  Surgeon: Carmen Pickard MD;  [facility];  Service: Gynecology    NC COLONOSCOPY FLX DX W/[facility] WHEN PFRMD N/A 4/4/2019    Procedure: COLONOSCOPY;  Surgeon: Kristy Deleon DO;  [facility]; Service: Gastroenterology    NC CYSTOURETHROSCOPY N/A 3/17/2016    Procedure: CYSTOSCOPY;  Surgeon: Carmen Pickard MD;  [facility];  Service: Gynecology    NC ESOPHAGOGASTRODUODENOSCOPY TRANSORAL DIAGNOSTIC N/A 4/16/2018    Procedure: EGD AND COLONOSCOPY;  Surgeon: Kristy Deleon DO;  [facility];   Service: Gastroenterology    NC LAP,SURG,COLECTOMY, PARTIAL, W/ANAST Right 1/13/2022    Procedure: Diagnostic laparoscopy, laparscopic right colectomy;  Surgeon: Efrain Jimenez MD;  [facility];  Service: Colorectal    NC REVAGINAL PROLAPSE,SACROSP LIG N/A 3/17/2016    Procedure: COLPOPEXY VAGINAL EXTRAPERITONEAL (VEC) ANTERIOR ;  Surgeon: Jason Ling MD;  [facility];  Service: Gynecology    NC SLING OPER STRES INCONTINENCE N/A 3/17/2016    Procedure: INSERTION PUBOVAGINAL SLING SINGLE INCISION ;  Surgeon: Jason Ling MD;  [facility];  Service: Gynecology     Social History   Social History     Substance and Sexual Activity   Alcohol Use Not Currently     Social History     Substance and Sexual Activity   Drug Use No     E-Cigarette/Vaping    E-Cigarette Use Never User      E-Cigarette/Vaping Substances    Nicotine No     THC No     CBD No     Flavoring No     Other No     Unknown No      Social History     Tobacco Use   Smoking Status Never Smoker   Smokeless Tobacco Never Used     Family History:   Family History   Problem Relation Age of Onset    Diabetes Mother     Breast cancer Sister 48    No Known Problems Father     No Known Problems Maternal Grandmother     No Known Problems Maternal Grandfather     No Known Problems Paternal Grandmother     No Known Problems Paternal Grandfather     No Known Problems Daughter     No Known Problems Son     No Known Problems Sister     No Known Problems Sister     No Known Problems Brother     No Known Problems Brother     No Known Problems Sister     No Known Problems Paternal Aunt     No Known Problems Paternal Aunt     No Known Problems Paternal Aunt     No Known Problems Paternal Aunt        Review of previous medical records was completed   Reviewed prior notes, labs, CT head, CTA head/neck, CT perfusion    Meds/Allergies   all current active meds have been reviewed, current meds:   Current Facility-Administered Medications Medication Dose Route Frequency    cyanocobalamin injection 1,000 mcg  1,000 mcg Intramuscular Q30 Days    heparin (porcine) 25,000 units in 0 45% NaCl 250 mL infusion (premix)  3-24 Units/kg/hr (Order-Specific) Intravenous Titrated    and PTA meds:   Prior to Admission Medications   Prescriptions Last Dose Informant Patient Reported? Taking?    Blood Pressure Monitoring (Blood Pressure Cuff) MISC  Self No No   Sig: Use every other day For HTN   cyanocobalamin 1,000 mcg/mL  Self No No   Sig: INJECT 1 ML (1,000 MCG TOTAL) INTO A MUSCLE EVERY 30 (THIRTY) DAYS   furosemide (LASIX) 20 mg tablet   No No   Sig: Take 1 tablet (20 mg total) by mouth daily   losartan (COZAAR) 100 MG tablet  Self No No   Sig: Take 1 tablet (100 mg total) by mouth daily   magnesium oxide (MAG-OX) 400 mg  Self No No   Sig: TAKE 1 TABLET (400 MG TOTAL) BY MOUTH DAILY   nebivolol (BYSTOLIC) 5 mg tablet  Self No No   Sig: Take 1 tablet (5 mg total) by mouth daily   polyvinyl alcohol (LIQUIFILM TEARS) 1 4 % ophthalmic solution  Self No No   Sig: Administer 1 drop to the right eye as needed for dry eyes   potassium chloride (MICRO-K) 10 MEQ CR capsule   No No   Sig: Take 1 capsule (10 mEq total) by mouth daily   rivaroxaban (Xarelto) 15 mg tablet  Self No No   Sig: Take 1 tablet (15 mg total) by mouth daily at bedtime   rosuvastatin (CRESTOR) 10 MG tablet   No No   Sig: TAKE 1 TABLET (10 MG TOTAL) BY MOUTH DAILY   spironolactone (ALDACTONE) 25 mg tablet  Self Yes No   Sig: Take 25 mg by mouth daily   triamcinolone (KENALOG) 0 1 % cream  Self No No   Sig: Apply topically 2 (two) times a day Apply to left arm topically 2 times daily for 1 week   verapamil (CALAN-SR) 240 mg CR tablet   No No   Sig: TAKE 1 TABLET (240 MG TOTAL) BY MOUTH DAILY AT BEDTIME      Facility-Administered Medications Last Administration Doses Remaining   cyanocobalamin injection 1,000 mcg 12/20/2021 12:38 PM           Allergies   Allergen Reactions    Other Itching Bandaids    Tape [Medical Tape] Itching       Objective   Vitals: There were no vitals taken for this visit  ,There is no height or weight on file to calculate BMI  No intake or output data in the 24 hours ending 08/12/22 0921    Invasive Devices: Invasive Devices  Report    None                 Physical Exam  Vitals and nursing note reviewed  Constitutional:       Appearance: Normal appearance  Comments: Patient lying comfortably in bed in no acute distress   HENT:      Head: Normocephalic and atraumatic  Mouth/Throat:      Mouth: Mucous membranes are moist       Pharynx: Oropharynx is clear  Eyes:      Extraocular Movements: Extraocular movements intact  Conjunctiva/sclera: Conjunctivae normal       Pupils: Pupils are equal, round, and reactive to light  Pulmonary:      Effort: Pulmonary effort is normal    Musculoskeletal:      Comments: Left sided weakness   Skin:     General: Skin is warm and dry  Neurological:      Mental Status: She is alert and oriented to person, place, and time  Comments: See full neuro exam below       Neurologic Exam     Mental Status   Oriented to person, place, and time  Patient awake and alert  Oriented to person, age, month, and year  Slight dysarthria, but speech is intelligible  No aphasia  Able to follow simple midline and appendicular commands  Cranial Nerves     CN III, IV, VI   Pupils are equal, round, and reactive to light  Pupils equal and round  EOMs intact without nystagmus  No gaze preference  No visual field deficits  Facial sensation to pinprick is sharp throughout  Left central facial weakness  Tongue midline       Motor Exam   Muscle bulk: normal  Increased tone in left UE with contracture  5/5 left  strength, but no effort against gravity in the left proximal UE  Flaccid left LE  Able to maintain antigravity in the right UE/LE     Sensory Exam   Pinprick is sharp throughout, but slight decrease in light touch in the left UE/LE  No extinction abnormality     Gait, Coordination, and Reflexes     Gait  Gait: (Deferred for patient's safety)  No ataxia finger-to-nose or heel-to-shin on the right   Unable to assess coordination in the left UE/LE due to weakness  Upgoing left toe  Downgoing right toe  No ankle clonus bilaterally  NIHSS:  1a Level of Consciousness: 0 = Alert   1b  LOC Questions: 0 = Answers both correctly   1c  LOC Commands: 0 = Obeys both correctly   2  Best Gaze: 0 = Normal   3  Visual: 0 = No visual field loss   4  Facial Palsy: 2=Partial paralysis (total or near total paralysis of the lower face)   5a  Motor Right Arm: 0=No drift, limb holds 90 (or 45) degrees for full 10 seconds   5b  Motor Left Arm: 3=No effort against gravity, limb falls   6a  Motor Right Le=No drift, limb holds 90 (or 45) degrees for full 10 seconds   6b  Motor Left Le=No movement   7  Limb Ataxia:  0=Absent   8  Sensory: 1=Mild to moderate sensory loss; patient feels pinprick is less sharp or is dull on the affected side; there is a loss of superficial pain with pinprick but patient is aware She is being touched   9  Best Language:  0=No aphasia, normal   10  Dysarthria: 1=Mild to moderate, patient slurs at least some words and at worst, can be understood with some difficulty   11  Extinction and Inattention (formerly Neglect): 0=No abnormality   Total Score: 11     Time NIHSS was completed: 0930    Modified Oswaldo Score:  0 (No baseline symptoms/disability)    Lab Results:   I have personally reviewed pertinent reports    , CBC:   Results from last 7 days   Lab Units 22  0940   WBC Thousand/uL 11 30*   RBC Million/uL 3 76*   HEMOGLOBIN g/dL 11 3*   HEMATOCRIT % 35 8   MCV fL 95   PLATELETS Thousands/uL 231   , BMP/CMP:   Results from last 7 days   Lab Units 22  0940   SODIUM mmol/L 139   POTASSIUM mmol/L 4 1   CHLORIDE mmol/L 109*   CO2 mmol/L 23   BUN mg/dL 18   CREATININE mg/dL 1 07   CALCIUM mg/dL 9 5   EGFR ml/min/1 73sq m 48   , Vitamin B12:   , HgBA1C:   , TSH:   , Coagulation:   Results from last 7 days   Lab Units 08/12/22  0940   INR  1 86*   , Lipid Profile:   , Ammonia:   , Urinalysis:       Invalid input(s): URIBILINOGEN, Drug Screen:   , Medication Drug Levels:       Invalid input(s): CARBAMAZEPINE,  PHENOBARB, LACOSAMIDE, OXCARBAZEPINE  Imaging Studies: I have personally reviewed pertinent reports  and I have personally reviewed pertinent films in PACS  EKG, Pathology, and Other Studies: I have personally reviewed pertinent reports  and I have personally reviewed pertinent films in PACS  VTE Prophylaxis: Sequential compression device (Venodyne)  and Heparin drip    Code Status: Prior  Advance Directive and Living Will:      Power of :    POLST:      Refer to attending Neurologist's attestation for critical care time

## 2022-08-12 NOTE — Clinical Note
Case was discussed with Dr Sha Holland and the patient's admission status was agreed to be Admission Status: inpatient status to the service of Dr Reg Brown

## 2022-08-12 NOTE — ED ATTENDING ATTESTATION
8/12/2022  IKae DO, saw and evaluated the patient  I have discussed the patient with the resident/non-physician practitioner and agree with the resident's/non-physician practitioner's findings, Plan of Care, and MDM as documented in the resident's/non-physician practitioner's note, except where noted  All available labs and Radiology studies were reviewed  I was present for key portions of any procedure(s) performed by the resident/non-physician practitioner and I was immediately available to provide assistance  At this point I agree with the current assessment done in the Emergency Department  I have conducted an independent evaluation of this patient a history and physical is as follows:    Patient is an 14-year-old female, atrial fibrillation on Xarelto, went to bed at 11:00 p m  Last night feeling well, woke about 6:00 a m  This morning and noticed she could not move her left leg or left arm  No trauma, no fever, no chills  Says it feels a bit decreased sensation to the left arm and left leg  No headache  Last dose of Xarelto was last night, she has been compliant  EMS indicated blood sugar was unremarkable at 161 prior to arrival     General:  Patient is well-appearing  Head:  Atraumatic  Eyes:  Conjunctiva pink, Extraocular muscle intact, PERRL  ENT:  Mucous membranes are moist  Neck:  Supple  Cardiac:  S1-S2, without murmurs  Lungs:  Clear to auscultation bilaterally  Abdomen:  Soft, nontender, normal bowel sounds, no CVA tenderness, no tympany, no rigidity, no guarding  Extremities:  Normal range of motion  Neurologic:  Awake, fluent speech, normal comprehension  AAOx3  Cranial nerves 2-12 are intact, left arm has some mild contractures at her elbow and wrist   She cannot lift the arm off the ground, nor hold it up when it is raised  Left leg is completely flaccid, cannot lift it off the ground, nor hold up when it is raised    strength is 5/5 in the right arm and right leg, no slurred speech, no facial droop, no deficit on finger-to-nose testing, no pronator drift on the right, she cannot do this the left  Sensation to light touch is slightly decreased on the left arm and left leg,  Skin:  Pink warm and dry, no rash  Psychiatric:  Alert, pleasant, cooperative            ED Course  ED Course as of 08/12/22 1140   Fri Aug 12, 2022   1006 ECG interpreted me, ventricular paced rhythm, rate of 67     Patient arrived as a pre-hospital stroke alert, immediately evaluated by myself and the Neurology team   Patient is not a tPA candidate since she is compliant with her Xarelto and last dose was last night  Patient then seen by a stroke attending Moose Hameed, who recommended CT perfusion scan to be done  He indicated the CT perfusion showed a small perfusion deficit, but in discussion with Interventional Radiology, there was no acute treatment indicated  Was recommend the patient to be started on heparin her neuro protocol, as well as admitted to the neuro critical care service  Neurology spoke with Dr Kendy Wheeler, neuro critical care ICU attending who accepted the patient for admission  Patient was started on heparin, reassessed several times, no change the above findings  Critical Care Time  Procedures    38 minutes   See separate procedure note for details

## 2022-08-13 ENCOUNTER — APPOINTMENT (INPATIENT)
Dept: NON INVASIVE DIAGNOSTICS | Facility: HOSPITAL | Age: 82
DRG: 065 | End: 2022-08-13
Payer: MEDICARE

## 2022-08-13 ENCOUNTER — APPOINTMENT (INPATIENT)
Dept: RADIOLOGY | Facility: HOSPITAL | Age: 82
DRG: 065 | End: 2022-08-13
Payer: MEDICARE

## 2022-08-13 PROBLEM — E78.5 HYPERLIPIDEMIA LDL GOAL <100: Status: ACTIVE | Noted: 2017-12-08

## 2022-08-13 PROBLEM — I63.9 ACUTE CEREBROVASCULAR ACCIDENT (CVA) (HCC): Chronic | Status: ACTIVE | Noted: 2022-08-12

## 2022-08-13 PROBLEM — I10 ESSENTIAL HYPERTENSION: Status: ACTIVE | Noted: 2017-12-08

## 2022-08-13 LAB
ANION GAP SERPL CALCULATED.3IONS-SCNC: 8 MMOL/L (ref 4–13)
APTT PPP: 43 SECONDS (ref 23–37)
APTT PPP: 59 SECONDS (ref 23–37)
BASOPHILS # BLD AUTO: 0.03 THOUSANDS/ΜL (ref 0–0.1)
BASOPHILS NFR BLD AUTO: 0 % (ref 0–1)
BUN SERPL-MCNC: 16 MG/DL (ref 5–25)
CALCIUM SERPL-MCNC: 8.4 MG/DL (ref 8.3–10.1)
CHLORIDE SERPL-SCNC: 111 MMOL/L (ref 96–108)
CHOLEST SERPL-MCNC: 100 MG/DL
CO2 SERPL-SCNC: 21 MMOL/L (ref 21–32)
CREAT SERPL-MCNC: 0.87 MG/DL (ref 0.6–1.3)
EOSINOPHIL # BLD AUTO: 0.08 THOUSAND/ΜL (ref 0–0.61)
EOSINOPHIL NFR BLD AUTO: 1 % (ref 0–6)
ERYTHROCYTE [DISTWIDTH] IN BLOOD BY AUTOMATED COUNT: 14.4 % (ref 11.6–15.1)
EST. AVERAGE GLUCOSE BLD GHB EST-MCNC: 108 MG/DL
GFR SERPL CREATININE-BSD FRML MDRD: 62 ML/MIN/1.73SQ M
GLUCOSE SERPL-MCNC: 87 MG/DL (ref 65–140)
HBA1C MFR BLD: 5.4 %
HCT VFR BLD AUTO: 31.4 % (ref 34.8–46.1)
HDLC SERPL-MCNC: 48 MG/DL
HGB BLD-MCNC: 10.2 G/DL (ref 11.5–15.4)
IMM GRANULOCYTES # BLD AUTO: 0.03 THOUSAND/UL (ref 0–0.2)
IMM GRANULOCYTES NFR BLD AUTO: 0 % (ref 0–2)
LDLC SERPL CALC-MCNC: 38 MG/DL (ref 0–100)
LYMPHOCYTES # BLD AUTO: 1.27 THOUSANDS/ΜL (ref 0.6–4.47)
LYMPHOCYTES NFR BLD AUTO: 16 % (ref 14–44)
MAGNESIUM SERPL-MCNC: 1.8 MG/DL (ref 1.6–2.6)
MCH RBC QN AUTO: 30.2 PG (ref 26.8–34.3)
MCHC RBC AUTO-ENTMCNC: 32.5 G/DL (ref 31.4–37.4)
MCV RBC AUTO: 93 FL (ref 82–98)
MONOCYTES # BLD AUTO: 0.78 THOUSAND/ΜL (ref 0.17–1.22)
MONOCYTES NFR BLD AUTO: 10 % (ref 4–12)
NEUTROPHILS # BLD AUTO: 5.81 THOUSANDS/ΜL (ref 1.85–7.62)
NEUTS SEG NFR BLD AUTO: 73 % (ref 43–75)
NONHDLC SERPL-MCNC: 52 MG/DL
NRBC BLD AUTO-RTO: 0 /100 WBCS
PHOSPHATE SERPL-MCNC: 2.9 MG/DL (ref 2.3–4.1)
PLATELET # BLD AUTO: 199 THOUSANDS/UL (ref 149–390)
PMV BLD AUTO: 10.6 FL (ref 8.9–12.7)
POTASSIUM SERPL-SCNC: 3.7 MMOL/L (ref 3.5–5.3)
RBC # BLD AUTO: 3.38 MILLION/UL (ref 3.81–5.12)
SODIUM SERPL-SCNC: 140 MMOL/L (ref 135–147)
TRIGL SERPL-MCNC: 71 MG/DL
WBC # BLD AUTO: 8 THOUSAND/UL (ref 4.31–10.16)

## 2022-08-13 PROCEDURE — 85730 THROMBOPLASTIN TIME PARTIAL: CPT | Performed by: EMERGENCY MEDICINE

## 2022-08-13 PROCEDURE — 99232 SBSQ HOSP IP/OBS MODERATE 35: CPT | Performed by: PSYCHIATRY & NEUROLOGY

## 2022-08-13 PROCEDURE — 83036 HEMOGLOBIN GLYCOSYLATED A1C: CPT | Performed by: PSYCHIATRY & NEUROLOGY

## 2022-08-13 PROCEDURE — 97167 OT EVAL HIGH COMPLEX 60 MIN: CPT

## 2022-08-13 PROCEDURE — 84100 ASSAY OF PHOSPHORUS: CPT | Performed by: PSYCHIATRY & NEUROLOGY

## 2022-08-13 PROCEDURE — 97530 THERAPEUTIC ACTIVITIES: CPT

## 2022-08-13 PROCEDURE — 83735 ASSAY OF MAGNESIUM: CPT | Performed by: PSYCHIATRY & NEUROLOGY

## 2022-08-13 PROCEDURE — 97535 SELF CARE MNGMENT TRAINING: CPT

## 2022-08-13 PROCEDURE — 70450 CT HEAD/BRAIN W/O DYE: CPT

## 2022-08-13 PROCEDURE — 80061 LIPID PANEL: CPT | Performed by: PSYCHIATRY & NEUROLOGY

## 2022-08-13 PROCEDURE — 80048 BASIC METABOLIC PNL TOTAL CA: CPT | Performed by: PSYCHIATRY & NEUROLOGY

## 2022-08-13 PROCEDURE — G1004 CDSM NDSC: HCPCS

## 2022-08-13 PROCEDURE — 93306 TTE W/DOPPLER COMPLETE: CPT | Performed by: INTERNAL MEDICINE

## 2022-08-13 PROCEDURE — 93306 TTE W/DOPPLER COMPLETE: CPT

## 2022-08-13 PROCEDURE — 85730 THROMBOPLASTIN TIME PARTIAL: CPT | Performed by: PSYCHIATRY & NEUROLOGY

## 2022-08-13 PROCEDURE — 97163 PT EVAL HIGH COMPLEX 45 MIN: CPT

## 2022-08-13 PROCEDURE — 85025 COMPLETE CBC W/AUTO DIFF WBC: CPT | Performed by: PSYCHIATRY & NEUROLOGY

## 2022-08-13 PROCEDURE — NC001 PR NO CHARGE: Performed by: PSYCHIATRY & NEUROLOGY

## 2022-08-13 PROCEDURE — 92610 EVALUATE SWALLOWING FUNCTION: CPT

## 2022-08-13 RX ORDER — ATORVASTATIN CALCIUM 10 MG/1
10 TABLET, FILM COATED ORAL
Status: DISCONTINUED | OUTPATIENT
Start: 2022-08-13 | End: 2022-08-14

## 2022-08-13 RX ADMIN — ATORVASTATIN CALCIUM 10 MG: 10 TABLET, FILM COATED ORAL at 21:14

## 2022-08-13 RX ADMIN — HEPARIN SODIUM 15 UNITS/KG/HR: 10000 INJECTION, SOLUTION INTRAVENOUS at 14:20

## 2022-08-13 NOTE — PLAN OF CARE
Problem: Prexisting or High Potential for Compromised Skin Integrity  Goal: Skin integrity is maintained or improved  Description: INTERVENTIONS:  - Identify patients at risk for skin breakdown  - Assess and monitor skin integrity  - Assess and monitor nutrition and hydration status  - Monitor labs   - Assess for incontinence   - Turn and reposition patient  - Assist with mobility/ambulation  - Relieve pressure over bony prominences  - Avoid friction and shearing  - Provide appropriate hygiene as needed including keeping skin clean and dry  - Evaluate need for skin moisturizer/barrier cream  - Collaborate with interdisciplinary team   - Patient/family teaching  - Consider wound care consult   Outcome: Progressing     Problem: MOBILITY - ADULT  Goal: Maintain or return to baseline ADL function  Description: INTERVENTIONS:  -  Assess patient's ability to carry out ADLs; assess patient's baseline for ADL function and identify physical deficits which impact ability to perform ADLs (bathing, care of mouth/teeth, toileting, grooming, dressing, etc )  - Assess/evaluate cause of self-care deficits   - Assess range of motion  - Assess patient's mobility; develop plan if impaired  - Assess patient's need for assistive devices and provide as appropriate  - Encourage maximum independence but intervene and supervise when necessary  - Involve family in performance of ADLs  - Assess for home care needs following discharge   - Consider OT consult to assist with ADL evaluation and planning for discharge  - Provide patient education as appropriate  Outcome: Progressing  Goal: Maintains/Returns to pre admission functional level  Description: INTERVENTIONS:  - Perform BMAT or MOVE assessment daily    - Set and communicate daily mobility goal to care team and patient/family/caregiver  - Collaborate with rehabilitation services on mobility goals if consulted  - Perform Range of Motion 3 times a day    - Reposition patient every 2 hours     - Out of bed for toileting  - Record patient progress and toleration of activity level   Outcome: Progressing     Problem: PAIN - ADULT  Goal: Verbalizes/displays adequate comfort level or baseline comfort level  Description: Interventions:  - Encourage patient to monitor pain and request assistance  - Assess pain using appropriate pain scale  - Administer analgesics based on type and severity of pain and evaluate response  - Implement non-pharmacological measures as appropriate and evaluate response  - Consider cultural and social influences on pain and pain management  - Notify physician/advanced practitioner if interventions unsuccessful or patient reports new pain  Outcome: Progressing     Problem: INFECTION - ADULT  Goal: Absence or prevention of progression during hospitalization  Description: INTERVENTIONS:  - Assess and monitor for signs and symptoms of infection  - Monitor lab/diagnostic results  - Monitor all insertion sites, i e  indwelling lines, tubes, and drains  - Monitor endotracheal if appropriate and nasal secretions for changes in amount and color  - [place] appropriate cooling/warming therapies per order  - Administer medications as ordered  - Instruct and encourage patient and family to use good hand hygiene technique  - Identify and instruct in appropriate isolation precautions for identified infection/condition  Outcome: Progressing  Goal: Absence of fever/infection during neutropenic period  Description: INTERVENTIONS:  - Monitor WBC    Outcome: Progressing     Problem: SAFETY ADULT  Goal: Maintain or return to baseline ADL function  Description: INTERVENTIONS:  -  Assess patient's ability to carry out ADLs; assess patient's baseline for ADL function and identify physical deficits which impact ability to perform ADLs (bathing, care of mouth/teeth, toileting, grooming, dressing, etc )  - Assess/evaluate cause of self-care deficits   - Assess range of motion  - Assess patient's mobility; develop plan if impaired  - Assess patient's need for assistive devices and provide as appropriate  - Encourage maximum independence but intervene and supervise when necessary  - Involve family in performance of ADLs  - Assess for home care needs following discharge   - Consider OT consult to assist with ADL evaluation and planning for discharge  - Provide patient education as appropriate  Outcome: Progressing  Goal: Maintains/Returns to pre admission functional level  Description: INTERVENTIONS:  - Perform BMAT or MOVE assessment daily    - Set and communicate daily mobility goal to care team and patient/family/caregiver  - Collaborate with rehabilitation services on mobility goals if consulted  - Perform Range of Motion 3 times a day  - Reposition patient every 2 hours      - Out of bed for toileting  - Record patient progress and toleration of activity level   Outcome: Progressing  Goal: Patient will remain free of falls  Description: INTERVENTIONS:  - Educate patient/family on patient safety including physical limitations  - Instruct patient to call for assistance with activity   - Consult OT/PT to assist with strengthening/mobility   - Keep Call bell within reach  - Keep bed low and locked with side rails adjusted as appropriate  - Keep care items and personal belongings within reach  - Initiate and maintain comfort rounds  - Make Fall Risk Sign visible to staff  - Offer Toileting every 2 Hours, in advance of need  - Initiate/Maintain bed alarm    - Apply yellow socks and bracelet for high fall risk patients  - Consider moving patient to room near nurses station  Outcome: Progressing     Problem: DISCHARGE PLANNING  Goal: Discharge to home or other facility with appropriate resources  Description: INTERVENTIONS:  - Identify barriers to discharge w/patient and caregiver  - Arrange for needed discharge resources and transportation as appropriate  - Identify discharge learning needs (meds, wound care, etc )  - Arrange for interpretive services to assist at discharge as needed  - Refer to Case Management Department for coordinating discharge planning if the patient needs post-hospital services based on physician/advanced practitioner order or complex needs related to functional status, cognitive ability, or social support system  Outcome: Progressing     Problem: Knowledge Deficit  Goal: Patient/family/caregiver demonstrates understanding of disease process, treatment plan, medications, and discharge instructions  Description: Complete learning assessment and assess knowledge base    Interventions:  - Provide teaching at level of understanding  - Provide teaching via preferred learning methods  Outcome: Progressing

## 2022-08-13 NOTE — PROGRESS NOTES
Progress Note - Neurology   Godwin Cohn 80 y o  female [id number]  Unit/Bed#: [facility] 717/[facility] 0-46    Assessment/Plan:  * Acute cerebrovascular accident (CVA) [facility])  Assessment & Plan  80 y o   female with Afib on Xarelto, sick sinus syndrome s/p pacemaker (not MRI conditional), HTN, and HLD who presented to [facility] on 8/12/2022 as a stroke alert for contracted and weak LUE, flaccid LLE, left facial droop, mild dysarthria, and mild sensory deficits on the left side  Upon arrival to the ED, /80 on arrival   NIHSS 11  CTA revealed near occlusive thormbus at R MCA Bifurcation  Patient was not a TNK candidate due to bleeding risk from taking Xarelto last night, and she was not a thrombectomy candidate per Dr Clement Jackson  Patient was started on stroke protocol heparin gtt and admitted on the stroke pathway  Imaging:  - CT head shows focal area of gliosis within the high right postcentral gyrus and scattered chronic microvascular ischemic changes with more focal lucency along the anterior limb of the right internal capsule, but no acute hemorrhage     - CTA head/neck shows near occlusive thrombus at the right MCA bifurcation, mild beading of the mid to distal ICAs suspicious for mild fibromuscular dysplasia, and mild atherosclerotic disease at bilateral distal carotid arteries  - CT cerebral perfusion shows 8 mL mismatch     - Repeat CTH 8/12 revealed subtle loss of gray-white matter differentiation in the right temporal lobe and insular cortex in keeping with right MCA territory infarction    - Unable to get MRI as pacemaker is not compatible     Patient continues to have left sided weakness, however no longer has dysarthria, facial droop or sensory deficits  Etiology for stroke remains unclear, but concern for Afib with Xarelto failure (no missed doses of Xarelto)  No known cancer or other hypercoagulable history      Plan:  - Stroke pathway  - Started on stroke protocol heparin gtt (PTT goal 50-70); hold home Xarelto  - Patient may need to transition to alternate anticoagulant due to concern for Xarelto failure  - Previously on Crestor 10 mg, recommend to decrease to Crestor 5 mg (LDL 38)  - Recommend repeat CTA head/neck on Monday 8/15   - Echo pending  - Lipid Panel: Cholesterol 100, LDL 38   - Hemoglobin A1c 5 4  - Permissive HTN, labetalol if SBP >180 (lower SBP goal due to heparin gtt)  - Euglycemic, normothermic goal  - Continue telemetry  - PT/OT/ST  - Stroke education  - Frequent neuro checks  Continue to monitor and notify neurology with any changes   - STAT CT head for any acute change in neuro exam  - Medical management and supportive care per primary team  Correction of any metabolic or infectious disturbances  Plan discussed with Attending Neurologist, please see attestation for further input/recommendations  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 [facility])  Assessment & Plan  - CTA head/neck shows near occlusive thrombus at the right MCA bifurcation, mild beading of the mid to distal ICAs suspicious for mild fibromuscular dysplasia, and mild atherosclerotic disease at bilateral distal carotid arteries  - Attending Neurologist discussed the case with Neuro IR, and no mechanical thrombectomy warranted at this time  - Patient started on stroke protocol heparin gtt    Hyperlipidemia LDL goal <100  Assessment & Plan  - Recommend Crestor 5 mg daily (previously on 10 mg at home)     Essential hypertension  Assessment & Plan  - Permissive HTN at this time, with SBP goal <160-180    Chronic atrial fibrillation (HCC)  Assessment & Plan  - On Xarelto 15 mg daily QHS  No missed doses  Last dose on 8/11 before going to bed    - Currently on stroke protocol heparin gtt (PTT goal 50-70)  - May need to consider transitioning to a different anticoagulant due to concern for Xarelto failure      Chalino Trujillo will need follow up in in 6 weeks with neurovascular attending or advance practitioner  She will not require outpatient neurological testing  Subjective:   Patient seen resting comfortably in bed  She continues to have left sided weakness (detailed below), however her speech appears clear and she no longer has a left facial droop  Sensation intact throughout  Past Medical History:   Diagnosis Date    A-fib ([address] )     Anemia     Arthritis     Breast pain, right     Chest pain on breathing     Colon polyp     Cough     Diverticulosis     Encounter for screening colonoscopy     H/O sick sinus syndrome     Heart murmur     High cholesterol     History of atrial fibrillation     Rate ontrolled  On xarelto 15mg (creatinine 50) Followed by Dr Sumner Morning with Cardiology     History of weight loss     Report loose fitting clothes  Weight stable (3lbs difference)  Last colo showed small tubular adenoma x2  Breast biopsy last showed fibrocystic changes  TSH wnl  Will check cbc, bmp, spep   Hx of long term use of blood thinners     Hypertension     Irregular heart beat     Pacemaker     Preop examination     Shortness of breath     Viral bronchitis      Past Surgical History:   Procedure Laterality Date    BREAST BIOPSY Right 08/17/2006    ultrasound guided Percutaneous Needle Core -Benign    CATARACT EXTRACTION      CATARACT EXTRACTION W/ INTRAOCULAR LENS  IMPLANT, BILATERAL      COLONOSCOPY      COLONOSCOPY N/A 5/22/2018    Procedure: COLONOSCOPY;  Surgeon: Binh Abarca DO;  [facility];   Service: Gastroenterology    Javy Vance / Sweta Mendez / Mike Sotomayor Right     as a child after a fall    MAMMO STEREOTACTIC BREAST BIOPSY RIGHT (ALL INC) Right 10/2014    benign    TN CMBND ANTERPOST COLPORRAPHY W/CYSTO N/A 3/17/2016    Procedure: COLPORRHAPHY ANTERIOR POSTERIOR ;  Surgeon: Matthew Huerta MD;  [facility];  Service: Gynecology    TN COLONOSCOPY FLX DX W/COLLJ SPEC WHEN PFRMD N/A 4/4/2019    Procedure: COLONOSCOPY; Surgeon: Omaira Cortez DO;  [facility]; Service: Gastroenterology    IA CYSTOURETHROSCOPY N/A 3/17/2016    Procedure: CYSTOSCOPY;  Surgeon: Cheikh Brar MD;  [facility];  Service: Gynecology    IA ESOPHAGOGASTRODUODENOSCOPY TRANSORAL DIAGNOSTIC N/A 4/16/2018    Procedure: EGD AND COLONOSCOPY;  Surgeon: Omaira Cortez DO;  [facility]; Service: Gastroenterology    IA LAP,SURG,COLECTOMY, PARTIAL, W/ANAST Right 1/13/2022    Procedure: Diagnostic laparoscopy, laparscopic right colectomy;  Surgeon: Kay Tafoya MD;  [facility];  Service: Colorectal    IA REVAGINAL PROLAPSE,SACROSP LIG N/A 3/17/2016    Procedure: COLPOPEXY VAGINAL EXTRAPERITONEAL (VEC) ANTERIOR ;  Surgeon: Cheikh Brar MD;  [facility];  Service: Gynecology    IA SLING OPER STRES INCONTINENCE N/A 3/17/2016    Procedure: INSERTION PUBOVAGINAL SLING SINGLE INCISION ;  Surgeon: Cheikh Brar MD;  [facility];  Service: Gynecology     Family History   Problem Relation Age of Onset    Diabetes Mother     Breast cancer Sister 48    No Known Problems Father     No Known Problems Maternal Grandmother     No Known Problems Maternal Grandfather     No Known Problems Paternal Grandmother     No Known Problems Paternal Grandfather     No Known Problems Daughter     No Known Problems Son     No Known Problems Sister     No Known Problems Sister     No Known Problems Brother     No Known Problems Brother     No Known Problems Sister     No Known Problems Paternal Aunt     No Known Problems Paternal Aunt     No Known Problems Paternal Aunt     No Known Problems Paternal Aunt      Social History     Socioeconomic History    Marital status:       Spouse name: None    Number of children: None    Years of education: None    Highest education level: None   Occupational History    Occupation: retired   Tobacco Use    Smoking status: Never Smoker  Smokeless tobacco: Never Used   Vaping Use    Vaping Use: Never used   Substance and Sexual Activity    Alcohol use: Not Currently    Drug use: No    Sexual activity: Not Currently     Comment:    Other Topics Concern    None   Social History Narrative    Housing, household, and economic circumstances      Social Determinants of Health     Financial Resource Strain: Low Risk     Difficulty of Paying Living Expenses: Not hard at all   Food Insecurity: No Food Insecurity    Worried About Running Out of Food in the Last Year: Never true    Doris of Food in the Last Year: Never true   Transportation Needs: No Transportation Needs    Lack of Transportation (Medical): No    Lack of Transportation (Non-Medical): No   Physical Activity: Not on file   Stress: Not on file   Social Connections: Not on file   Intimate Partner Violence: Not on file   Housing Stability: Low Risk     Unable to Pay for Housing in the Last Year: No    Number of Places Lived in the Last Year: 1    Unstable Housing in the Last Year: No       Medications: Reviewed in detail by me  ROS: Review of Systems   Constitutional: Negative for fatigue and fever  Eyes: Negative for photophobia and visual disturbance  Respiratory: Negative for cough and shortness of breath  Cardiovascular: Negative for chest pain and palpitations  Musculoskeletal: Negative for back pain and neck pain  Skin: Negative for color change and pallor  Neurological: Positive for weakness (left sided)  Negative for dizziness, tremors, seizures, syncope, facial asymmetry, speech difficulty, light-headedness, numbness and headaches  Psychiatric/Behavioral: Negative for confusion  The patient is not nervous/anxious  All other systems reviewed and are negative         Vitals: /74   Pulse 72   Temp 99 5 °F (37 5 °C)   Resp 19   Ht 5' 3" (1 6 m)   Wt 65 3 kg (144 lb)   SpO2 93%   BMI 25 51 kg/m²     Physical Exam:   Physical Exam  Vitals reviewed  Constitutional:       General: She is not in acute distress  Appearance: Normal appearance  She is normal weight  She is not ill-appearing  HENT:      Head: Normocephalic and atraumatic  Right Ear: External ear normal       Left Ear: External ear normal       Nose: Nose normal       Mouth/Throat:      Mouth: Mucous membranes are moist    Eyes:      General:         Right eye: No discharge  Left eye: No discharge  Extraocular Movements: Extraocular movements intact and EOM normal       Conjunctiva/sclera: Conjunctivae normal       Pupils: Pupils are equal, round, and reactive to light  Cardiovascular:      Rate and Rhythm: Normal rate  Pulmonary:      Effort: Pulmonary effort is normal  No respiratory distress  Musculoskeletal:         General: No tenderness  Cervical back: Normal range of motion  No rigidity  Right lower leg: No edema  Left lower leg: No edema  Skin:     General: Skin is warm and dry  Coloration: Skin is not jaundiced or pale  Neurological:      Mental Status: She is alert and oriented to person, place, and time  Sensory: No sensory deficit  Motor: Weakness present  Coordination: Finger-nose-finger test: Normal on the right, unable to complete on L secondary to weakness  Psychiatric:         Mood and Affect: Mood normal          Speech: Speech normal          Behavior: Behavior normal        Neurologic Exam     Mental Status   Oriented to person, place, and time  Follows 2 step commands  Attention: normal  Concentration: normal    Speech: speech is normal   Level of consciousness: alert  Knowledge: good  Normal comprehension  Cranial Nerves     CN II   Visual fields full to confrontation  CN III, IV, VI   Pupils are equal, round, and reactive to light  Extraocular motions are normal    Nystagmus: none   Diplopia: none  Conjugate gaze: present    CN V   Facial sensation intact       CN VII   Facial expression full, symmetric  CN VIII   CN VIII normal    Hearing: intact    CN XII   CN XII normal      Motor Exam   Muscle bulk: normal    Strength   Strength 5/5 except as noted  Increased tone in LUE with contracture     Left upper extremity: 5/5  strength, unable to lift arm antigravity, however isolated motor testing revealed 4+/5 biceps, 3/5 triceps     Left lower extremity: unable to lift antigravity, unable to wiggle toes      Sensory Exam   Light touch normal      Gait, Coordination, and Reflexes     Gait  Gait: (deferred for safety)    Coordination   Finger-nose-finger test: Normal on the right, unable to complete on L secondary to weakness  Tremor   Resting tremor: absent  Intention tremor: absent  Action tremor: absent    Reflexes   Reflexes 2+ except as noted  Labs: Reviewed in detail by me  Imaging: I have personally reviewed pertinent imaging and PACS reports  VTE Prophylaxis: Heparin    Total time spent today 22 minutes  Greater than 50% of total time was spent with the patient and/or family counseling and/or coordination of care  A description of the counseling/coordination of care:  Patient was seen and evaluated  Discussed with attending  Chart reviewed thoroughly including laboratory and imaging studies  Discussed medication regimen, imaging reviewed   Plan of care discussed with patient and primary team

## 2022-08-13 NOTE — PLAN OF CARE
Problem: OCCUPATIONAL THERAPY ADULT  Goal: Performs self-care activities at highest level of function for planned discharge setting  See evaluation for individualized goals  Description: Treatment Interventions: ADL retraining, Functional transfer training, UE strengthening/ROM, Patient/family training, Equipment evaluation/education, Compensatory technique education, Energy conservation, Activityengagement, Endurance training, Cognitive reorientation, Fine motor coordination activities          See flowsheet documentation for full assessment, interventions and recommendations  Note: Limitation: Decreased ADL status, Decreased UE ROM, Decreased UE strength, Decreased Safe judgement during ADL, Decreased self-care trans, Decreased high-level ADLs, Non-func L UE, Decreased cognition, Decreased endurance, Decreased fine motor control  Prognosis: Fair  Assessment: Pt is a 81 yo Female who presented to [facility] on 8/12/2022 with left-sided weakness, left-sided facial droop, dysarthria, and sensory loss  Pt with diagnosis of  Acute cerebrovascular accident (CVA)  CT head/neck results show 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  Pt  has a past medical history of A-fib ([address] ), Anemia, Arthritis, Breast pain, right, Chest pain on breathing, Colon polyp, Cough, Diverticulosis, Encounter for screening colonoscopy, H/O sick sinus syndrome, Heart murmur, High cholesterol, History of atrial fibrillation, History of weight loss, long term use of blood thinners, Hypertension, Irregular heart beat, Pacemaker, Preop examination, Shortness of breath, and Viral bronchitis  Pt greeted bedside/up in recliner chair for OT evaluation on 8/13/2022  Pt lives alone in an 8th floor apartment with elevator access  PTA, Pt reports I with ADLs and functional mobility without AD  Per EMR, Pt receives assistance from PCA Mon-Thurs for IADLs including cooking and cleaning in the home and transporting to medical appointments   Pt demonstrating the following occupational deficits: Max A with UB ADLs, Total A with LB ADLs, Mod Ax2 for bed mobility, and Max Ax2 for functional transfers with B/L HHA and knee blocking  Limitations that impact functional performance include decreased ADL status, decreased UE ROM, decreased UE strength, decreased safe judgement during ADLs, decreased cognition, decreased endurance, decreased fine motor coordination, decreased self care transfers, decreased high level ADLs and non functioning L UE  Occupational performance areas to address ADL retraining, functional transfer training, UE strengthening/ROM, endurance training, cognitive reorientation, Pt/caregiver education, fine motor coordination activities, compensatory technique education, continued education, energy conservation and activity engagement   Pt would benefit from continued skilled OT services while in hospital to maximize independence with ADLs  Will continue to follow Pt's progress  Pt would benefit from post acute rehabilitation services upon DC to maximize safety and independence with ADLs and functional tasks of choice       OT Discharge Recommendation: Post acute rehabilitation services

## 2022-08-13 NOTE — PROGRESS NOTES
ICU Transfer Note  Viv Kamara 80 y o  female MRN: [id number]  Unit/Bed#: [facility] 717-01 Encounter: [id number]    Code Status: Level 1 - Full Code  POA:    POLST:       Accepting Attending: Tamra Lacey DO    Reason for ICU admission:   Right MCA stroke    Active problems:   Principal Problem:    Acute cerebrovascular accident (CVA) ([facility] )  Active Problems:    Essential hypertension    Hyperlipidemia LDL goal <100    Chronic atrial fibrillation ([facility] )    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 [facility])  Resolved Problems:    * No resolved hospital problems  *      Consultants:   Neurology  Endovascular    History of Present Illness:     Viv Kamara is a 80 y o  female with history of A-fib on Xarelto, SSS s/p pacemaker (not MRI conditional), HTN, hyperlipidemia  She presented to the ED on morning of 8/12/22 as a stroke alert due to the above mentioned symptoms  Patient was last known normal at 2300 on 8/11/22  She woke up morning of 8/12/22 with left arm contracted, left leg weakness, and left facial droop  She was able to speak to her son who called EMS  On arrival to the ED, NIHSS was 11  She was found to have a near-occlusive thrombus of the right MCA at the bifurcation, mild beading of the mid-distal ICAs, and mild atherosclerosis  CT perfusion notable for 0 cc core, 8 cc penumbra with infinite mismatch  Patient was not a tPA candidate due to Xarelto use  Endovascular team evaluated case and determined that she was not a candidate for thrombectomy  She was started on heparin gtt stroke pathway and admitted to the CC service step down unit  Summary of clinical course:   Patient has had some improvement in strength in the left upper extremity  The left lower extremity remains flaccid  Repeat imaging reveals subacute stroke in the right temporal lobe and insular cortex without hemorrhagic conversion  She has been therapeutic on heparin stroke protocol   Unable to obtain MRI due to pacemaker that is not compatible  She is stable to transfer to the Neurology service  Recent or scheduled procedures:   n/a    Outstanding/pending diagnostics: Following PTT on heparin drip    Cultures:   n/a       Mobilization Plan:   PT/OT following    Nutrition Plan:   Regular diet, thin liquids    Invasive Devices Review  Invasive Devices  Report    Peripheral Intravenous Line  Duration           Peripheral IV 08/12/22 Left Antecubital 1 day    Peripheral IV 08/12/22 Left Forearm 1 day                Rationale for remaining devices: IV access    VTE Pharmacologic Prophylaxis: Heparin Drip  VTE Mechanical Prophylaxis: sequential compression device    Discharge Plan:   Patient should be ready for discharge to post-acute rehab    Initial Physical Therapy Recommendations: Post Acute Rehab  Initial Occupational Therapy Recommendations: Post Acute Rehab  Initial /Plan: Pending    Home medications that are not reordered and reason why:   Lasix, Losartan, Nebivolol, Spironolactone, Verapamil  Discontinued Xarelto due to ineffectiveness    Spoke with TOMY Perez  regarding transfer  Please contact critical care via [address] [place] [address] with any questions or concerns  Portions of the record may have been created with voice recognition software  Occasional wrong word or "sound a like" substitutions may have occurred due to the inherent limitations of voice recognition software  Read the chart carefully and recognize, using context, where substitutions have occurred

## 2022-08-13 NOTE — PLAN OF CARE
Problem: Prexisting or High Potential for Compromised Skin Integrity  Goal: Skin integrity is maintained or improved  Description: INTERVENTIONS:  - Identify patients at risk for skin breakdown  - Assess and monitor skin integrity  - Assess and monitor nutrition and hydration status  - Monitor labs   - Assess for incontinence   - Turn and reposition patient  - Assist with mobility/ambulation  - Relieve pressure over bony prominences  - Avoid friction and shearing  - Provide appropriate hygiene as needed including keeping skin clean and dry  - Evaluate need for skin moisturizer/barrier cream  - Collaborate with interdisciplinary team   - Patient/family teaching  - Consider wound care consult   8/13/2022 0749 by Jaimie Gonzalez RN  Outcome: Progressing  8/12/2022 1839 by Jaimie Gonzalez RN  Outcome: Progressing     Problem: MOBILITY - ADULT  Goal: Maintain or return to baseline ADL function  Description: INTERVENTIONS:  -  Assess patient's ability to carry out ADLs; assess patient's baseline for ADL function and identify physical deficits which impact ability to perform ADLs (bathing, care of mouth/teeth, toileting, grooming, dressing, etc )  - Assess/evaluate cause of self-care deficits   - Assess range of motion  - Assess patient's mobility; develop plan if impaired  - Assess patient's need for assistive devices and provide as appropriate  - Encourage maximum independence but intervene and supervise when necessary  - Involve family in performance of ADLs  - Assess for home care needs following discharge   - Consider OT consult to assist with ADL evaluation and planning for discharge  - Provide patient education as appropriate  8/13/2022 0749 by Jaimie Gonzalez RN  Outcome: Progressing  8/12/2022 1839 by Jaimie Gonzalez RN  Outcome: Progressing  Goal: Maintains/Returns to pre admission functional level  Description: INTERVENTIONS:  - Perform BMAT or MOVE assessment daily    - Set and communicate daily mobility goal to care team and patient/family/caregiver     - Collaborate with rehabilitation services on mobility goals if consulted    - Out of bed for toileting  - Record patient progress and toleration of activity level   8/13/2022 0749 by Jerad Arango RN  Outcome: Progressing  8/12/2022 1839 by Jerad Arango RN  Outcome: Progressing     Problem: PAIN - ADULT  Goal: Verbalizes/displays adequate comfort level or baseline comfort level  Description: Interventions:  - Encourage patient to monitor pain and request assistance  - Assess pain using appropriate pain scale  - Administer analgesics based on type and severity of pain and evaluate response  - Implement non-pharmacological measures as appropriate and evaluate response  - Consider cultural and social influences on pain and pain management  - Notify physician/advanced practitioner if interventions unsuccessful or patient reports new pain  8/13/2022 0749 by Jerad Arango RN  Outcome: Progressing  8/12/2022 1839 by Jerad Arango RN  Outcome: Progressing     Problem: INFECTION - ADULT  Goal: Absence or prevention of progression during hospitalization  Description: INTERVENTIONS:  - Assess and monitor for signs and symptoms of infection  - Monitor lab/diagnostic results  - Monitor all insertion sites, i e  indwelling lines, tubes, and drains  - Monitor endotracheal if appropriate and nasal secretions for changes in amount and color  - [place] appropriate cooling/warming therapies per order  - Administer medications as ordered  - Instruct and encourage patient and family to use good hand hygiene technique  - Identify and instruct in appropriate isolation precautions for identified infection/condition  8/13/2022 0749 by Jerad Arango RN  Outcome: Progressing  8/12/2022 1839 by Jerad Arango RN  Outcome: Progressing  Goal: Absence of fever/infection during neutropenic period  Description: INTERVENTIONS:  - Monitor WBC    8/13/2022 0749 by Jerad Arango RN  Outcome: Progressing  8/12/2022 1839 by Rafia Bonilla RN  Outcome: Progressing     Problem: SAFETY ADULT  Goal: Maintain or return to baseline ADL function  Description: INTERVENTIONS:  -  Assess patient's ability to carry out ADLs; assess patient's baseline for ADL function and identify physical deficits which impact ability to perform ADLs (bathing, care of mouth/teeth, toileting, grooming, dressing, etc )  - Assess/evaluate cause of self-care deficits   - Assess range of motion  - Assess patient's mobility; develop plan if impaired  - Assess patient's need for assistive devices and provide as appropriate  - Encourage maximum independence but intervene and supervise when necessary  - Involve family in performance of ADLs  - Assess for home care needs following discharge   - Consider OT consult to assist with ADL evaluation and planning for discharge  - Provide patient education as appropriate  8/13/2022 0749 by Rafia Bonilla RN  Outcome: Progressing  8/12/2022 1839 by Rafia Bonilla RN  Outcome: Progressing  Goal: Maintains/Returns to pre admission functional level  Description: INTERVENTIONS:  - Perform BMAT or MOVE assessment daily    - Set and communicate daily mobility goal to care team and patient/family/caregiver  - Collaborate with rehabilitation services on mobility goals if consulted    - Reposition patient every 2 hours      - Out of bed for toileting  - Record patient progress and toleration of activity level   8/13/2022 0749 by Rafia Bonilla RN  Outcome: Progressing  8/12/2022 1839 by Rafia Bonilla RN  Outcome: Progressing  Goal: Patient will remain free of falls  Description: INTERVENTIONS:  - Educate patient/family on patient safety including physical limitations  - Instruct patient to call for assistance with activity   - Consult OT/PT to assist with strengthening/mobility   - Keep Call bell within reach  - Keep bed low and locked with side rails adjusted as appropriate  - Keep care items and personal belongings within reach  - Initiate and maintain comfort rounds  - Make Fall Risk Sign visible to staff    - Apply yellow socks and bracelet for high fall risk patients  - Consider moving patient to room near nurses station  8/13/2022 0749 by Jerad Arango RN  Outcome: Progressing  8/12/2022 1839 by Jerad Arango RN  Outcome: Progressing     Problem: DISCHARGE PLANNING  Goal: Discharge to home or other facility with appropriate resources  Description: INTERVENTIONS:  - Identify barriers to discharge w/patient and caregiver  - Arrange for needed discharge resources and transportation as appropriate  - Identify discharge learning needs (meds, wound care, etc )  - Arrange for interpretive services to assist at discharge as needed  - Refer to Case Management Department for coordinating discharge planning if the patient needs post-hospital services based on physician/advanced practitioner order or complex needs related to functional status, cognitive ability, or social support system  8/13/2022 0749 by Jerad Arango RN  Outcome: Progressing  8/12/2022 1839 by Jerad Arango RN  Outcome: Progressing     Problem: Knowledge Deficit  Goal: Patient/family/caregiver demonstrates understanding of disease process, treatment plan, medications, and discharge instructions  Description: Complete learning assessment and assess knowledge base    Interventions:  - Provide teaching at level of understanding  - Provide teaching via preferred learning methods  8/13/2022 0749 by Jerad Arango RN  Outcome: Progressing  8/12/2022 1839 by Jerad Arango RN  Outcome: Progressing     Problem: Potential for Falls  Goal: Patient will remain free of falls  Description: INTERVENTIONS:  - Educate patient/family on patient safety including physical limitations  - Instruct patient to call for assistance with activity   - Consult OT/PT to assist with strengthening/mobility   - Keep Call bell within reach  - Keep bed low and locked with side rails adjusted as appropriate  - Keep care items and personal belongings within reach  - Initiate and maintain comfort rounds  - Make Fall Risk Sign visible to staff  - Offer Toileting every 2 Hours, in advance of need  - Initiate/Maintain bed alarm  - Obtain necessary fall risk management equipment:   - Apply yellow socks and bracelet for high fall risk patients  - Consider moving patient to room near nurses station  8/13/2022 0749 by Jonas Mcclain RN  Outcome: Progressing  8/12/2022 6129 by Jonas Mcclain RN  Outcome: Progressing

## 2022-08-13 NOTE — SPEECH THERAPY NOTE
Speech Language/Pathology    Speech-Language Pathology Bedside Swallow Evaluation      Patient Name: Keren Vernon    QCMMU'Q Date: 8/13/2022     Problem List  Principal Problem:    Acute cerebrovascular accident (CVA) [facility])  Active Problems:    Essential hypertension    Hyperlipidemia LDL goal <100    [facility] ([facility] )    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 [facility])      Past Medical History  Past Medical History:   Diagnosis Date    A-fib ([facility] )     Anemia     Arthritis     Breast pain, right     Chest pain on breathing     Colon polyp     Cough     Diverticulosis     Encounter for screening colonoscopy     H/O sick sinus syndrome     Heart murmur     High cholesterol     History of atrial fibrillation     Rate ontrolled  On xarelto 15mg (creatinine 50) Followed by Dr Valeria Adler with Cardiology     History of weight loss     Report loose fitting clothes  Weight stable (3lbs difference)  Last colo showed small tubular adenoma x2  Breast biopsy last showed fibrocystic changes  TSH wnl  Will check cbc, bmp, spep   Hx of long term use of blood thinners     Hypertension     Irregular heart beat     Pacemaker     Preop examination     Shortness of breath     Viral bronchitis        Past Surgical History  Past Surgical History:   Procedure Laterality Date    BREAST BIOPSY Right 08/17/2006    ultrasound guided Percutaneous Needle Core -Benign    CATARACT EXTRACTION      CATARACT EXTRACTION W/ INTRAOCULAR LENS  IMPLANT, BILATERAL      COLONOSCOPY      COLONOSCOPY N/A 5/22/2018    Procedure: COLONOSCOPY;  Surgeon: Dion Jimenez DO;  Location: [facility];   Service: Gastroenterology    Marissa Bundy / Natasha Obrien / Hakan Rangel Right     as a child after a fall    MAMMO STEREOTACTIC BREAST BIOPSY RIGHT (ALL INC) Right 10/2014    benign    DE CMBND ANTERPOST COLPORRAPHY W/CYSTO N/A 3/17/2016    Procedure: COLPORRHAPHY ANTERIOR POSTERIOR ; Surgeon: Marek Martinez MD;  Location: [facility];  Service: Gynecology    MD COLONOSCOPY FLX DX W/[facility] WHEN PFRMD N/A 4/4/2019    Procedure: COLONOSCOPY;  Surgeon: Dmitry Lara DO;  Location: [facility]; Service: Gastroenterology    MD CYSTOURETHROSCOPY N/A 3/17/2016    Procedure: CYSTOSCOPY;  Surgeon: Marek Martinez MD;  [facility];  Service: Gynecology    MD ESOPHAGOGASTRODUODENOSCOPY TRANSORAL DIAGNOSTIC N/A 4/16/2018    Procedure: EGD AND COLONOSCOPY;  Surgeon: Dmitry Lara DO;  Location: [facility]; Service: Gastroenterology    MD LAP,SURG,COLECTOMY, PARTIAL, W/ANAST Right 1/13/2022    Procedure: Diagnostic laparoscopy, laparscopic right colectomy;  Surgeon: Aracelis Pantoja MD;  Location: [facility];  Service: Colorectal    MD REVAGINAL PROLAPSE,SACROSP LIG N/A 3/17/2016    Procedure: COLPOPEXY VAGINAL EXTRAPERITONEAL (VEC) ANTERIOR ;  Surgeon: Marek Martinez MD;  [facility];  Service: Gynecology    MD SLING OPER STRES INCONTINENCE N/A 3/17/2016    Procedure: INSERTION PUBOVAGINAL SLING SINGLE INCISION ;  Surgeon: Marek Martinez MD;  [facility];  Service: Gynecology       Summary   Pt presented with functional appearing oral and pharyngeal stage swallowing skills with materials administered today  Assessment somewhat limited 2/2 reduced bolus acceptance, though no significant difficulties w/ mastication, oral manipulation, or transfer  Swallows suspected timely  No overt s/s aspiration  Education provided on strategies to optimize swallow safety and s/s aspiration to notify her medical team of should they arise, pt and family demonstrated understanding       Risk/s for Aspiration: Low      Recommended Diet: regular diet and thin liquids   Recommended Form of Meds: whole with liquid   Aspiration precautions and swallowing strategies: upright posture, only feed when fully alert and slow rate of feeding  Other Recommendations: Continue frequent oral care, brief ST f/u w/ larger sample as able and appropriate        Current Medical Status  Pt is a 80 y o  female who presented to [facility] with  Afib on Xarelto, sick sinus syndrome s/p pacemaker (not MRI conditional), HTN, and HLD who presented to [facility] on [facility]/12/2022 as a stroke alert for contracted and weak LUE, flaccid LLE, left facial droop, mild dysarthria, and mild sensory deficits on the left side  Upon arrival to the ED, /80 on arrival   NIHSS 11  CTA revealed near occlusive thormbus at R MCA Bifurcation  Patient was not a TNK candidate due to bleeding risk from taking Xarelto last night, and she was not a thrombectomy candidate per Dr Lisette Johnson  Patient was started on stroke protocol heparin gtt and admitted on the stroke pathway       Current Precautions:  Fall     Allergies:  No known food allergies    Past medical history:  Please see H&P for details    Special Studies:  CT stroke alert 8/12:    No definite CT evidence for large acute vascular distribution infarct or acute intracranial hemorrhage      CTA stroke alert 8/12:    Near occlusive thrombus at the right MCA bifurcation      Mild beading of the mid to distal internal carotid arteries suspicious for mild fibromuscular dysplasia      Atherosclerotic plaque at the carotid bulbs without evidence for high-grade stenosis or focal occlusion of the cervical vasculature      Partially imaged pulmonary edema and layering bilateral pleural effusions      Social/Education/Vocational Hx:  Pt lives with family    Swallow Information   Current Risks for Dysphagia & Aspiration: CVA  Current Symptoms/Concerns: initial L facial droop  Current Diet: regular diet and thin liquids   Baseline Diet: regular diet and thin liquids      Baseline Assessment   Behavior/Cognition: alert  Speech/Language Status: able to participate in conversation, able to follow commands and [place]ian speaking though understands/speaks English, pt's family present to translate as needed  Patient Positioning: upright in bed  Pain Status/Interventions/Response to Interventions:   No report of or nonverbal indications of pain  Swallow Mechanism Exam  Facial: symmetrical  Labial: WFL  Lingual: WFL  Velum: symmetrical  Mandible: adequate ROM  Dentition: adequate  Vocal quality:clear/adequate   Volitional Cough: strong/productive   Respiratory Status: on RA      Consistencies Assessed and Performance   Consistencies Administered: thin liquids, puree, soft solids and hard solids    Oral Stage: WFL  Mastication was adequate with the materials administered today  Bolus formation and transfer were functional with no significant oral residue noted  No overt s/s reduced oral control  Pharyngeal Stage: WFL  Swallow Mechanics:  Swallowing initiation appeared prompt  Laryngeal rise was palpated and judged to be within functional limits  No coughing, throat clearing, change in vocal quality or respiratory status noted today  Esophageal Concerns: none reported    Strategies and Efficacy: Pt independently uses liquid wash to aid in bolus formation of dry solids    Summary and Recommendations (see above)    Results Reviewed with: patient, RN and dietician     Treatment Recommended: Yes     Frequency of treatment: Brief x1-2 over larger sample as able     Patient Stated Goal: "can I swallow ok?"     Dysphagia LTG  -Patient will demonstrate safe and effective oral intake (without overt s/s significant oral/pharyngeal dysphagia including s/s penetration or aspiration) for the highest appropriate diet level                 Speech Therapy Prognosis   Prognosis: good    Prognosis Considerations: age and medical status

## 2022-08-13 NOTE — PLAN OF CARE
Problem: PHYSICAL THERAPY ADULT  Goal: Performs mobility at highest level of function for planned discharge setting  See evaluation for individualized goals  Description: Treatment/Interventions: Functional transfer training, LE strengthening/ROM, Therapeutic exercise, Endurance training, Patient/family training, Bed mobility, Equipment eval/education, Gait training, Compensatory technique education, Spoke to nursing, OT (balance training)          See flowsheet documentation for full assessment, interventions and recommendations  Note: Prognosis: Fair  Problem List: Decreased strength, Decreased endurance, Impaired balance, Decreased mobility, Decreased coordination  Assessment: Pt is 80 y o  female seen for a high complexity PT evaluation s/p admit to [facility] on 8/12/2022  Pt presenting w/ acute CVA  CTA head/neck shows near occlusive thrombus at the R MCA bifurcation, mild beading of the mid to distal ICA's suspicious for mild fibromuscular dysplasia, and mild atherosclerotic disease at bilateral distal carotid arteries  Please see above for other active problem list / PMH  PT now consulted to assess functional mobility and needs for safe d/c planning  Prior to admission, pt was I w/o AD, lives alone in an apartment w/o stairs  Currently pt requires ModAx2 for bed skills; ModAx2 for functional transfers; ModAx2 for limited ambulation w/ B HHA  Pt presents functioning below baseline and w/ overall mobility deficits 2* to: decreased LLE strength; generalized weakness/deconditioning; decreased endurance; impaired balance; gait deviations; fatigue; impaired safety and judgement; limited insight into current deficits; bed/chair alarms; multiple lines  Pt currently at risk for falls   (Please find additional objective findings from PT assessment regarding body systems outlined above ) Pt will continue to benefit from skilled PT interventions to address stated impairments; to maximize functional potential; for ongoing pt/ family training; and DME needs  PT is currently recommending rehab  Barriers to Discharge: Decreased caregiver support     PT Discharge Recommendation: Post acute rehabilitation services    See flowsheet documentation for full assessment

## 2022-08-13 NOTE — PROGRESS NOTES
Daily Progress Note - Critical Care   Godwin Cohn 80 y o  female MRN: [id number]  Unit/Bed#: [facility] 717-01 Encounter: [id number]        ----------------------------------------------------------------------------------------  HPI/24hr events: Godwin Cohn is a 80 y o  female with history of A-fib on Xarelto, SSS s/p pacemaker (not MRI conditional), HTN, hyperlipidemia  She presented to the ED on morning of 8/12/22 as a stroke alert due to the above mentioned symptoms  Patient was last known normal at 2300 on 8/11/22  She woke up morning of 8/12/22 with left arm contracted, left leg weakness, and left facial droop  She was able to speak to her son who called EMS  On arrival to the ED, NIHSS was 11  She was found to have a near-occlusive thrombus of the right MCA at the bifurcation, mild beading of the mid-distal ICAs, and mild atherosclerosis  CT perfusion notable for 0 cc core, 8 cc penumbra with infinite mismatch  Patient was not a tPA candidate due to Xarelto use  Endovascular team evaluated case and determined that she was not a candidate for thrombectomy  She was started on heparin gtt stroke pathway and admitted to the CC service step down unit  · No acute events overnight  · SBP was in the 120's and received 500 cc bolus of IV fluids with good response  · Repeat CTH completed    ---------------------------------------------------------------------------------------  SUBJECTIVE  Patient seen and examined at bedside  Patient reports feeling somewhat better today  She has some movement in the LUE compared to yesterday  This is better in the hand  Continues to have no movement in the LLE  She is otherwise feeling well and has no complaints  Review of Systems   Constitutional: Negative for chills, fever and unexpected weight change  HENT: Negative for ear pain, sore throat and trouble swallowing  Eyes: Negative for pain and visual disturbance     Respiratory: Negative for cough and shortness of breath  Cardiovascular: Negative for chest pain and palpitations  Gastrointestinal: Negative for abdominal pain and vomiting  Genitourinary: Negative for dysuria and hematuria  Musculoskeletal: Negative for arthralgias and back pain  Skin: Negative for color change and rash  Neurological: Positive for weakness  Negative for dizziness, tremors, seizures, syncope, facial asymmetry, speech difficulty, light-headedness, numbness and headaches  Psychiatric/Behavioral: Negative for dysphoric mood  The patient is not nervous/anxious  All other systems reviewed and are negative  Review of systems was reviewed and negative unless stated above in HPI/24-hour events   ---------------------------------------------------------------------------------------  Assessment and Plan:    Neuro:   · Diagnosis: Acute embolic ischemic right MCA stroke, likely due to A-fib in the setting of Xarelto compliance  May be a non-responder  ? Stroke pathway  ? Neuro checks q4h  ? Currently on heparin gtt stroke protocol  Goal PTT 50-70  ? At goal x 4 checks  ? Updated CTH with expected subacute stroke, no ICH  ? Unable to obtain MRI due to pacemaker not compatible  ? HgbA1c 5 4%, LDL 38  ? Reduced atorvastatin to 10 mg daily  ? Echo completed - awaiting official report  ? Monitor on telemetry  ? Neurology consulted  Appreciate recommendations  ? SBP goal 150-180  ? Euglycemia and normothermia  ? PT/OT/Speech/PMR  ? 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 D/c home Xarelto  ? Continue heparin gtt as outlined above  ? Will need to consider transitioning to a different DOAC  Price check will be needed  · Diagnosis: Hypertension  ?  Home meds: Lasix 20 mg qd, Losartan 100 mg qd, Nebivolol 5 mg qd, spironolactone 25 mg qd, verapamil 240 mg qd  ? Presenting /80  ? Home medications currently on hold due to permissive HTN  ? SBP goal 150-180  · Diagnosis: Hyperlipidemia  ? Home meds: Crestor 10 mg  ? Substitute atorvastatin though will use low-dose as LDL is only 38  · Diagnosis: Tachy-tasha syndrome  ? Pacemaker present that is not MRI compatible      Pulm:  · No active issues  · Incentive spirometry      GI:   · No acute issues      :   · No acute issues      F/E/N:    Fluids: none   E+: Replete to maintain K > 4, Mag > 2, Phos > 3   Nutrition: Regular diet, thin liquids      Heme/Onc:   · Diagnosis: Mild chronic anemia since Jan 2022  Hgb runs in the 10 5-11 5 range  ? No acute interventions  ? Monitor clinically      Endo:   · No acute issues      ID:   · No acute issues      MSK/Skin:   · Diagnosis: Left-sided weakness post-stroke  ? Will need PT/OT/Speech/PMR      Patient appropriate for transfer out of the ICU today?: Patient does not meet criteria for referral to the ICU Follow-Up Clinic; referral has not been made  Disposition: Transfer to Med Surg with Telemetry   Code Status: Level 1 - Full Code  ---------------------------------------------------------------------------------------  ICU CORE MEASURES    Prophylaxis   VTE Pharmacologic Prophylaxis: Heparin Drip  VTE Mechanical Prophylaxis: sequential compression device  Stress Ulcer Prophylaxis: Prophylaxis Not Indicated     ABCDE Protocol (if indicated)  Plan to perform spontaneous awakening trial today? Not applicable  Plan to perform spontaneous breathing trial today? Not applicable  Obvious barriers to extubation?  Not applicable  CAM-ICU: Negative    Invasive Devices Review  Invasive Devices  Report    Peripheral Intravenous Line  Duration           Peripheral IV 08/12/22 Left Antecubital 1 day    Peripheral IV 08/12/22 Left Forearm 1 day              Can any invasive devices be discontinued today? No  ---------------------------------------------------------------------------------------  OBJECTIVE    Vitals   Vitals:    22 0530 22 0558 22 0630 22 1230   BP: 160/74  164/74 164/74   Pulse: 72  70 72   Resp:       Temp:       TempSrc:       SpO2: 95%  93%    Weight:  65 4 kg (144 lb 2 9 oz)  65 3 kg (144 lb)   Height:    5' 3" (1 6 m)     Temp (24hrs), Av 3 °F (36 8 °C), Min:97 8 °F (36 6 °C), Max:99 5 °F (37 5 °C)  Current: Temperature: 99 5 °F (37 5 °C)  HR: 70  BP: 164/74  RR: 18  SpO2: 93%    Respiratory:  SpO2: SpO2: 93 %  Nasal Cannula O2 Flow Rate (L/min): 2 L/min    Invasive/non-invasive ventilation settings   Respiratory  Report   Lab Data (Last 4 hours)    None         O2/Vent Data (Last 4 hours)    None                Physical Exam  Vitals and nursing note reviewed  Constitutional:       General: She is not in acute distress  Appearance: Normal appearance  She is normal weight  She is not ill-appearing  HENT:      Head: Normocephalic and atraumatic  Right Ear: External ear normal       Left Ear: External ear normal       Nose: Nose normal       Mouth/Throat:      Mouth: Mucous membranes are moist       Pharynx: Oropharynx is clear  Eyes:      General: No scleral icterus  Extraocular Movements: Extraocular movements intact  Conjunctiva/sclera: Conjunctivae normal       Pupils: Pupils are equal, round, and reactive to light  Cardiovascular:      Rate and Rhythm: Normal rate  Rhythm irregularly irregular  Pulses: Normal pulses  Heart sounds: Normal heart sounds  Pulmonary:      Effort: Pulmonary effort is normal  No respiratory distress  Breath sounds: Normal breath sounds  Abdominal:      General: Abdomen is flat  Bowel sounds are normal  There is no distension  Palpations: Abdomen is soft  Musculoskeletal:      Cervical back: Neck supple  No rigidity  Right lower leg: No edema  Left lower leg: No edema  Skin:     General: Skin is warm and dry  Neurological:      Mental Status: She is alert and oriented to person, place, and time  Comments: Patient is awake, alert, and oriented x 3  Follows multi-step commands  Speech is clear and language intact  PERRL, EOMI  Facial droop has resolved  Tongue protrusion is midline  Uvula and soft palate elevated symmetrically  Motor: Full strength in RUE and RLE  On the left,  is 4/5, elbow flexion/extension is 3/5, shoulder abduction 1-2/5  LLE with decreased tone, possibly a flicker of muscle contraction in the left quad but otherwise 0/5  Sensation intact  Coordination is intact on the right  Toes are downgoing bilaterally   Psychiatric:         Attention and Perception: Attention and perception normal          Mood and Affect: Mood normal          Behavior: Behavior normal              Laboratory and Diagnostics:  Results from last 7 days   Lab Units 08/13/22  0555 08/12/22  1203 08/12/22  0940   WBC Thousand/uL 8 00 9 56 11 30*   HEMOGLOBIN g/dL 10 2* 10 4* 11 3*   HEMATOCRIT % 31 4* 33 0* 35 8   PLATELETS Thousands/uL 199 212 231   NEUTROS PCT % 73  --   --    MONOS PCT % 10  --   --      Results from last 7 days   Lab Units 08/13/22  0555 08/12/22  0940   SODIUM mmol/L 140 139   POTASSIUM mmol/L 3 7 4 1   CHLORIDE mmol/L 111* 109*   CO2 mmol/L 21 23   ANION GAP mmol/L 8 7   BUN mg/dL 16 18   CREATININE mg/dL 0 87 1 07   CALCIUM mg/dL 8 4 9 5   GLUCOSE RANDOM mg/dL 87 118     Results from last 7 days   Lab Units 08/13/22  0555   MAGNESIUM mg/dL 1 8   PHOSPHORUS mg/dL 2 9      Results from last 7 days   Lab Units 08/13/22  1011 08/12/22  2153 08/12/22  1702 08/12/22  0940   INR   --   --  1 57* 1 86*   PTT seconds 59* 70* 50*  57* 29              ABG:    VBG:          Micro        EKG: no new EKG today  Imaging: I have personally reviewed pertinent reports     and I have personally reviewed pertinent films in PACS     CT head wo contrast  Result Date: 8/13/2022  Impression: Interval development of subtle loss of gray-white matter differentiation in the right temporal lobe and insular cortex in keeping with right MCA territory infarction (series 2 images 11-14 )  There is no hemorrhagic conversion  Workstation performed: [id number]     CT stroke alert brain  Result Date: 8/12/2022  Impression: No definite CT evidence for large acute vascular distribution infarct or acute intracranial hemorrhage  Findings were directly discussed with Melania Zamora at 10:00 AM  Workstation performed: [id number]     CT cerebral perfusion  Result Date: 8/12/2022  Impression: CT perfusion performed  Data available on PACS  Workstation performed: [id number]     CTA stroke alert (head/neck)  Result Date: 8/12/2022  Impression: Near occlusive thrombus at the right MCA bifurcation  Mild beading of the mid to distal internal carotid arteries suspicious for mild fibromuscular dysplasia  Atherosclerotic plaque at the carotid bulbs without evidence for high-grade stenosis or focal occlusion of the cervical vasculature  Partially imaged pulmonary edema and layering bilateral pleural effusions  Findings were directly discussed with Melania Zamora at 10:00 AM  Workstation performed: [id number]       Intake and Output  I/O       08/11 0701  08/12 0700 08/12 0701  08/13 0700 08/13 0701  08/14 0700           Unmeasured Urine Occurrence  3 x           Height and Weights   Height: 5' 3" (160 cm)     Body mass index is 25 51 kg/m²    Weight (last 2 days)     Date/Time Weight    08/13/22 1230 65 3 (144)    08/13/22 0558 65 4 (144 18)    08/12/22 0941 66 1 (145 72)            Nutrition       Diet Orders   (From admission, onward)             Start     Ordered    08/12/22 1927  Diet Regular; Regular House  Diet effective now        References:    Nutrtion Support Algorithm Enteral vs  Parenteral   Question Answer Comment   Diet Type Regular    Regular Regular House    RD to adjust diet per protocol? Yes        08/12/22 1927                  Active Medications  Scheduled Meds:  Current Facility-Administered Medications   Medication Dose Route Frequency Provider Last Rate    atorvastatin  10 mg Oral Daily With [employer], DO      heparin (porcine)  3-24 Units/kg/hr (Order-Specific) Intravenous Titrated Marj Moflavio, DO 15 Units/kg/hr (08/13/22 1420)     Continuous Infusions:  heparin (porcine), 3-24 Units/kg/hr (Order-Specific), Last Rate: 15 Units/kg/hr (08/13/22 1420)      PRN Meds:        Allergies   Allergies   Allergen Reactions    Other Itching     Bandaids    Tape [Medical Tape] Itching     ---------------------------------------------------------------------------------------  Advance Directive and Living Will:      Power of :    POLST:    ---------------------------------------------------------------------------------------  Care Time Delivered:   Please refer to attending's note    Ofelia Daley DO      Portions of the record may have been created with voice recognition software  Occasional wrong word or "sound a like" substitutions may have occurred due to the inherent limitations of voice recognition software    Read the chart carefully and recognize, using context, where substitutions have occurred

## 2022-08-13 NOTE — UTILIZATION REVIEW
Initial Clinical Review    Admission: Date/Time/Statement:   Admission Orders (From admission, onward)     Ordered        08/12/22 1152  [address]  [address]                      Orders Placed This Encounter   Procedures    INPATIENT ADMISSION     Standing Status:   Standing     Number of Occurrences:   1     Order Specific Question:   Level of Care     Answer:   Level 1 Stepdown [13]     Order Specific Question:   Estimated length of stay     Answer:   More than 2 Midnights     Order Specific Question:   Certification     Answer:   I certify that inpatient services are medically necessary for this patient for a duration of greater than two midnights  See H&P and MD Progress Notes for additional information about the patient's course of treatment  ED Arrival Information     Expected   -    Arrival   8/12/2022 09:35    Acuity   Immediate            Means of arrival   Ambulance    Escorted by   [facility]    Admission type   Emergency            Arrival complaint   [facility]           Chief Complaint   Patient presents with   [address]     Woke up with left arm contracted and left leg paralysis, left facial droop  Initial Presentation: 80 y o  female presents to ED via  EMS  From home after waking the morning of admission with left arm contracted, left leg weakness and left facial droop  Was able to speak to son who called  EMS  NIHSS  11 on ED arrival   She was found to have a near-occlusive thrombus of the right MCA at the bifurcation, mild beading of the mid-distal ICAs, and mild atherosclerosis  Not a   TPA  Candidate as she is on  Xarelto  Not a candidate for thrombectomy per endovascular team   PMH is  Afib, on xarelto, SSS, S/P pacemaker and HTN  Started on IV heparin in ED  Admit  Ip ICU LOC  With  Right  MCA  Stroke and plan is  Neuro checks, IV heparin, monitor labs,  Tele, 2 DE, PT/OT/speech eval and  Monitor BP        Neuro consult  ( 8/12)  Continue IV heparin  Not a  TPA candidate  BP  200/80  Etiology  For stroke unclear  Unable to get  MRI  Brain due to pacemaker  Recommend  Repeat  CT scan  Date:    8/13        Day 2:   Speech appears clear  Still  With left sided weakness  No longer with left sided facial droop  Continue IV  Heparin  Continue PT/OT  Monitor labs/vital signs  Etiology  Unclear, but, concern for Afib with xarelto failure, has not missed any doses        ED Triage Vitals   Temperature Pulse Respirations Blood Pressure SpO2   08/12/22 0941 08/12/22 0941 08/12/22 0941 08/12/22 0943 08/12/22 0938   (!) 96 1 °F (35 6 °C) 78 18 (!) 200/80 90 %      Temp Source Heart Rate Source Patient Position - Orthostatic VS BP Location FiO2 (%)   08/12/22 0941 08/12/22 0941 08/12/22 0941 08/12/22 0941 --   Tympanic Monitor Lying Left arm       Pain Score       08/12/22 1700       No Pain          Wt Readings from Last 1 Encounters:   08/13/22 65 3 kg (144 lb)     Additional Vital Signs:   08/12/22 19:30:38 98 °F (36 7 °C) 70 19 -- -- 94 % -- -- -- -- --   08/12/22 1930 98 °F (36 7 °C) 65 -- 152/64 93 94 % -- -- -- -- --   08/12/22 1730 -- 67 -- 145/67 93 96 % -- -- -- -- --   08/12/22 17:20:44 98 3 °F (36 8 °C) 65 19 145/67 93 95 % -- -- -- -- --   08/12/22 14:18:04 -- -- -- 148/63 91 -- -- -- -- -- --   08/12/22 1245 -- 60 22 158/68 98 98 % 28 -- 2 L/min Nasal cannula Lying   08/12/22 1230 -- 60 23 Abnormal  160/71 102 98 % 28 -- 2 L/min Nasal cannula Lying   08/12/22 1215 -- 60 20 142/64 92 98 % -- 2 L/min -- Nasal cannula Lying   08/12/22 1200 -- 60 21 149/63 91 98 % 28 -- 2 L/min Nasal cannula Lying   08/12/22 1100 -- 60 19 147/64 -- 94 % -- 2 L/min -- BiPAP Lying   08/12/22 1030 -- 65 17 157/70 -- 95 % -- -- -- None (Room air) Lying   08/12/22 10:19:22 -- 62 16 -- -- 99 % -- -- -- None (Room air) Lying   08/12/22 1005 -- 68 16 -- -- 99 % -- -- -- None (Room air) Lying   08/12/22 10:00:46 -- 62 16 181/79 Abnormal  -- 98 % -- -- -- None (Room air) Lying   08/12/22 0955 -- 78 16 200/80 Abnormal  -- 99 % -- -- -- None (Room air) Lying   08/12/22 0944 -- 80 16 181/74 Abnormal  -- 99 % -- -- -- None (Room air) Lying   08/12/22 0943 -- -- -- 200/80 Abnormal  -- -- -- -- -- -- --   08/12/22 0941 96 1 °F (35 6 °C) Abnormal  78 18 -- 3 Abnormal  98 % -- -- -- None (Room air) Lying       Pertinent Labs/Diagnostic Test Results:   EKG    NSR  Wide  QRS      HR  67    No acute ST cahnges  CT head wo contrast   Final Result by Wero Bowen MD (08/13 1301)      Interval development of subtle loss of gray-white matter differentiation in the right temporal lobe and insular cortex in keeping with right MCA territory infarction (series 2 images 11-14 )  There is no hemorrhagic conversion  Workstation performed: [id number]         CT cerebral perfusion   Final Result by Zahira Martin MD (08/12 1108)      CT perfusion performed  Data available on PACS  Workstation performed: [id number]         CTA stroke alert (head/neck)   Final Result by Zahira Martin MD (08/12 1032)      Near occlusive thrombus at the right MCA bifurcation  Mild beading of the mid to distal internal carotid arteries suspicious for mild fibromuscular dysplasia  Atherosclerotic plaque at the carotid bulbs without evidence for high-grade stenosis or focal occlusion of the cervical vasculature  Partially imaged pulmonary edema and layering bilateral pleural effusions  Findings were directly discussed with Ayad Cheney at 10:00 AM                         Workstation performed: [id number]         CT stroke alert brain   Final Result by Zahira Martin MD (08/12 1021)      No definite CT evidence for large acute vascular distribution infarct or acute intracranial hemorrhage        Findings were directly discussed with Ayad Cheney at 10:00 AM          Workstation performed: [id number]           Results from last 7 days   Lab Units 08/12/22  1004   SARS-COV-2  Negative     Results from last 7 days   Lab Units 08/13/22  0555 08/12/22  1203 08/12/22  0940   WBC Thousand/uL 8 00 9 56 11 30*   HEMOGLOBIN g/dL 10 2* 10 4* 11 3*   HEMATOCRIT % 31 4* 33 0* 35 8   PLATELETS Thousands/uL 199 212 231   NEUTROS ABS Thousands/µL 5 81  --   --          Results from last 7 days   Lab Units 08/13/22  0555 08/12/22  0940   SODIUM mmol/L 140 139   POTASSIUM mmol/L 3 7 4 1   CHLORIDE mmol/L 111* 109*   CO2 mmol/L 21 23   ANION GAP mmol/L 8 7   BUN mg/dL 16 18   CREATININE mg/dL 0 87 1 07   EGFR ml/min/1 73sq m 62 48   CALCIUM mg/dL 8 4 9 5   MAGNESIUM mg/dL 1 8  --    PHOSPHORUS mg/dL 2 9  --          Results from last 7 days   Lab Units 08/12/22  0953   POC GLUCOSE mg/dl 129     Results from last 7 days   Lab Units 08/13/22  0555 08/12/22  0940   GLUCOSE RANDOM mg/dL 87 118         Results from last 7 days   Lab Units 08/13/22  0555   HEMOGLOBIN A1C % 5 4   EAG mg/dl 108       Results from last 7 days   Lab Units 08/12/22  1702 08/12/22  1203 08/12/22  0940   HS TNI 0HR ng/L  --   --  8   HS TNI 2HR ng/L  --  8  --    HSTNI D2 ng/L  --  0  --    HS TNI 4HR ng/L 9  --   --    HSTNI D4 ng/L 1  --   --          Results from last 7 days   Lab Units 08/13/22  1011 08/12/22  2153 08/12/22  1702 08/12/22  0940   PROTIME seconds  --   --  19 0* 21 7*   INR   --   --  1 57* 1 86*   PTT seconds 59* 70* 50*  57* 29           Results from last 7 days   Lab Units 08/12/22  1004   INFLUENZA A PCR  Negative   INFLUENZA B PCR  Negative   RSV PCR  Negative             ED Treatment:   Medication Administration from 08/12/2022 0916 to 08/12/2022 1349       Date/Time Order Dose Route Action Comments     08/12/2022 0953 iohexol (OMNIPAQUE) 350 MG/ML injection (MULTI-DOSE) 70 mL 70 mL Intravenous Given      08/12/2022 1042 iohexol (OMNIPAQUE) 350 MG/ML injection (MULTI-DOSE) 80 mL 80 mL Intravenous Given      08/12/2022 1215 heparin (ACUTE ISCHEMIC STROKE) 15 Units/kg/hr Intravenous Not Given      08/12/2022 1203 heparin (porcine) 25,000 units in 0 45% NaCl 250 mL infusion (premix) 15 Units/kg/hr Intravenous New Bag         Present on Admission:   Chronic atrial fibrillation ([facility] )   Essential hypertension   Hyperlipidemia LDL goal <100      Admitting Diagnosis: Cerebral thrombosis with cerebral infarction ([facility] ) [I63 30]  Essential hypertension [facility] Stroke Scale level of consciousness score 0, alert; keenly responsive [Z78 9]  Acute CVA (cerebrovascular accident) ([facility] ) [I63 9]  Age/Sex: 80 y o  female  Admission Orders:  Scheduled Medications:     Continuous IV Infusions:  heparin (porcine), 3-24 Units/kg/hr (Order-Specific), Intravenous, Titrated      PRN Meds:       IP CONSULT TO NEUROLOGY  IP CONSULT TO CASE MANAGEMENT  IP CONSULT TO NUTRITION SERVICES     Neuro checks  Q 15 min  X 4,  Q 30 min  X 2, then  1 hr  Fall precautions  Dysphagia eval    Network Utilization Review Department  ATTENTION: Please call with any questions or concerns to [phone] and carefully listen to the prompts so that you are directed to the right person  All voicemails are confidential   Maldonado Mohan all requests for admission clinical reviews, approved or denied determinations and any other requests to dedicated fax number below belonging to the campus where the patient is receiving treatment   List of dedicated fax numbers for the Facilities:  [address] DENIALS (Administrative/Medical Necessity) [phone]   [address] [place] [address] (Maternity/NICU/Pediatrics) [phone] [address] 125 [facility]  [address] [facility] [place] [address]   [address] [address] [place] [address] [address] [facility] [phone]

## 2022-08-13 NOTE — OCCUPATIONAL THERAPY NOTE
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 [facility])    Past Medical History  Past Medical History:   Diagnosis Date    A-fib ([facility] [address] )     Anemia     Arthritis     Breast pain, right     Chest pain on breathing     Colon polyp     Cough     Diverticulosis     Encounter for screening colonoscopy     H/O sick sinus syndrome     Heart murmur     High cholesterol     History of atrial fibrillation     Rate ontrolled  On xarelto 15mg (creatinine 50) Followed by Dr Matt Almonte with Cardiology     History of weight loss     Report loose fitting clothes  Weight stable (3lbs difference)  Last colo showed small tubular adenoma x2  Breast biopsy last showed fibrocystic changes  TSH wnl  Will check cbc, bmp, spep  Hx of long term use of blood thinners     Hypertension     Irregular heart beat     Pacemaker     Preop examination     Shortness of breath     Viral bronchitis      Past Surgical History  Past Surgical History:   Procedure Laterality Date    BREAST BIOPSY Right 08/17/2006    ultrasound guided Percutaneous Needle Core -Benign    CATARACT EXTRACTION      CATARACT EXTRACTION W/ INTRAOCULAR LENS  IMPLANT, BILATERAL      COLONOSCOPY      COLONOSCOPY N/A 5/22/2018    Procedure: COLONOSCOPY;  Surgeon: Carissa Gutierrez DO;  [facility];   Service: Gastroenterology    INSERT / Merlin Fitch / Anan Muñoz      LEG SURGERY Right     as a child after a fall    MAMMO STEREOTACTIC BREAST BIOPSY RIGHT (ALL INC) Right 10/2014    benign    SC CMBND ANTERPOST COLPORRAPHY W/CYSTO N/A 3/17/2016    Procedure: COLPORRHAPHY ANTERIOR POSTERIOR ;  Surgeon: Sushil Morse MD;  [facility];  Service: Gynecology    SC COLONOSCOPY FLX DX W/COLLJ SPEC WHEN PFRMD N/A 4/4/2019    Procedure: COLONOSCOPY;  Surgeon: Temitope Olvera DO;  [facility]: [facility]; Service: Gastroenterology    PA CYSTOURETHROSCOPY N/A 3/17/2016    Procedure: CYSTOSCOPY;  Surgeon: Harpal Amaral MD;  [facility];  Service: Gynecology    PA ESOPHAGOGASTRODUODENOSCOPY TRANSORAL DIAGNOSTIC N/A 4/16/2018    Procedure: EGD AND COLONOSCOPY;  Surgeon: Temitope Olvera DO;  [facility]; Service: Gastroenterology    PA LAP,SURG,COLECTOMY, PARTIAL, W/ANAST Right 1/13/2022    Procedure: Diagnostic laparoscopy, laparscopic right colectomy;  Surgeon: Junior Tanmay MD;  [facility];  Service: Colorectal    PA REVAGINAL PROLAPSE,SACROSP LIG N/A 3/17/2016    Procedure: COLPOPEXY VAGINAL EXTRAPERITONEAL (VEC) ANTERIOR ;  Surgeon: Harpal Amaral MD;  [facility];  Service: Gynecology    PA SLING OPER STRES INCONTINENCE N/A 3/17/2016    Procedure: INSERTION PUBOVAGINAL SLING SINGLE INCISION ;  Surgeon: Harpal Amaral MD;  [facility];  Service: Gynecology         08/13/22 0939   OT Last Visit   OT Visit Date 08/13/22   Note Type   Note type Evaluation   Restrictions/Precautions   Weight Bearing Precautions Per Order No   Other Precautions Chair Alarm; Bed Alarm;Multiple lines;Telemetry; Fall Risk  (IV)   Pain Assessment   Pain Assessment Tool 0-10   Pain Score No Pain   Home Living   Type of Home Apartment   Home Layout One level;Performs ADLs on one level;Elevator   Bathroom Shower/Tub Tub/shower unit   Bathroom Toilet Standard   Bathroom Equipment Grab bars in shower; Shower chair   [address]   (Denies use)   Additional Comments Pt lives alone in an 8th floor apartment with elevator access  Pt with son who is available to provide support upon DC  Prior Function   Level of [place] Independent with ADLs and functional mobility; Needs assistance with IADLs   Lives With (S)  Alone   Receives Help From Family;Personal care attendant  (PCA Mon-Thurs for 7 5 hrs/day)   ADL Assistance Independent   IADLs Needs assistance   Falls in the last 6 months 0   Vocational Retired   Comments PTA, Pt I with ADLs and functional mobility without AD  Per EMR, Pt receives assistance from PCA Mon-Thurs for IADLs including cooking and cleaning in the home and transporting to medical appointments  Lifestyle   Autonomy I with ADLs and functional mobility without AD  Reciprocal Relationships Supportive son   Service to Others Retired   Intrinsic Gratification Enjoys reading and watching tv   Psychosocial   Psychosocial (WDL) WDL   ADL   Where Assessed Supine, bed   Eating Assistance 3  Moderate Assistance  (Pt expressed concerns about self-feeding to SLP  Recommend finger foods with RUE (Pt is L hand dominate)  )   Grooming Assistance 3  Moderate Assistance   UB Bathing Assistance 2  Maximal Assistance   LB Bathing Assistance 1  Total Assistance   UB Dressing Assistance 2  Maximal Assistance   LB Dressing Assistance 1  Total Assistance   Toileting Assistance  2  Maximal Assistance   Toileting Deficit Setup;Steadying;Supervison/safety; Increased time to complete;Use of bedpan/urinal setup   Functional Assistance 2  Maximal Assistance   Functional Deficit Setup;Steadying;Verbal cueing;Supervision/safety; Increased time to complete   Additional Comments Pt requires increased assistance to complete ADL tasks 2* decreased mobility on L side of body  Pt reports being L handed  Bed Mobility   Rolling R 3  Moderate assistance   Additional items Assist x 1;Bedrails; Increased time required;LE management   Supine to Sit 3  Moderate assistance   Additional items Assist x 2;Bedrails; Increased time required;Verbal cues;LE management   Sit to Supine Unable to assess   Additional Comments Pt greeted supine in bed and left up in recliner chair at end of session  Chair alarm engaged and call bell left nearby   All needs met  Transfers   Sit to Stand 2  Maximal assistance   Additional items Assist x 2; Increased time required;Verbal cues   Stand to Sit 2  Maximal assistance   Additional items Assist x 2; Increased time required;Verbal cues   Stand pivot 2  Maximal assistance   Additional items Assist x 2; Increased time required;Verbal cues   Additional Comments Pt with R lateral lean upon sitting at EOB requiring Min A for support  Pt completes functional STS transfers with Max Ax2 and B/L HHA  Pt completes SPT from EOB to recliner chair with Max Ax2 with B/L HHA and B/L knee blocking  Functional Mobility   Additional Comments Unable to assess   Balance   Static Sitting Poor +   Dynamic Sitting Poor   Static Standing Poor -   Dynamic Standing Poor -   Activity Tolerance   Activity Tolerance Patient tolerated treatment well   Medical Staff Made Aware Co-evaluation with PT Tessa 2* Pt's medical complexity  Nurse Made Aware RN cleared/updated  RUE Assessment   RUE Assessment WFL  (4/5 mass grasp)   LUE Assessment   LUE Assessment X  (1/5 overall LUE strength  Observed spasticity  LUE held in contracted positions close to body )   LUE Overall PROM   L Shoulder Flexion 70*   L Elbow Flexion 80*   L Mass Grasp 4/5   LUE Tone   LUE Tone Hypertonic   Hand Function   Gross Motor Coordination Functional  (Unable to assess LUE)   Fine Motor Coordination Functional  (Unable to assess LUE)   Sensation   Light Touch No apparent deficits   Vision-Basic Assessment   Current Vision Wears glasses all the time   Cognition   Overall Cognitive Status [facility]   Arousal/Participation Alert; Cooperative   Attention Attends with cues to redirect   Orientation Level Oriented X4   Memory Within functional limits   Following Commands Follows one step commands without difficulty   Comments Pt very pleasant and cooperative throughout OT session  Pt's with increased processing time 2* PT 's preferred language is [address]     Assessment   Limitation Decreased ADL status; Decreased UE ROM; Decreased UE strength;Decreased Safe judgement during ADL;Decreased self-care trans;Decreased high-level ADLs; Non-func L UE;Decreased cognition;Decreased endurance;Decreased fine motor control   Prognosis Fair   Assessment Pt is a 81 yo Female who [facility] 8/[facility]/2022 with left-sided weakness, left-sided facial droop, dysarthria, and sensory loss  Pt with diagnosis of  Acute cerebrovascular accident (CVA)  CT head/neck results show 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  Pt  has a past medical history of A-fib ([facility] [address] ), Anemia, Arthritis, Breast pain, right, Chest pain on breathing, Colon polyp, Cough, Diverticulosis, Encounter for screening colonoscopy, H/O sick sinus syndrome, Heart murmur, High cholesterol, History of atrial fibrillation, History of weight loss, long term use of blood thinners, Hypertension, Irregular heart beat, Pacemaker, Preop examination, Shortness of breath, and Viral bronchitis  Pt greeted bedside/up in recliner chair for OT evaluation on 8/13/2022  Pt lives alone in an 8th floor apartment with elevator access  PTA, Pt reports I with ADLs and functional mobility without AD  Per EMR, Pt receives assistance from PCA Mon-Thurs for IADLs including cooking and cleaning in the home and transporting to medical appointments  Pt demonstrating the following occupational deficits: Max A with UB ADLs, Total A with LB ADLs, Mod Ax2 for bed mobility, and Max Ax2 for functional transfers with B/L HHA and knee blocking  Limitations that impact functional performance include decreased ADL status, decreased UE ROM, decreased UE strength, decreased safe judgement during ADLs, decreased cognition, decreased endurance, decreased fine motor coordination, decreased self care transfers, decreased high level ADLs and non functioning L UE   Occupational performance areas to address ADL retraining, functional transfer training, UE strengthening/ROM, endurance training, cognitive reorientation, Pt/caregiver education, fine motor coordination activities, compensatory technique education, continued education, energy conservation and activity engagement   Pt would benefit from continued skilled OT services while in hospital to maximize independence with ADLs  Will continue to follow Pt's progress  Pt would benefit from post acute rehabilitation services upon DC to maximize safety and independence with ADLs and functional tasks of choice  Goals   Patient Goals To go to the bathroom  LTG Time Frame 10-14   Long Term Goal #1 See goals listed below  Plan   Treatment Interventions ADL retraining;Functional transfer training;UE strengthening/ROM; Patient/family training;Equipment evaluation/education; Compensatory technique education; Energy conservation; Activityengagement; Endurance training;Cognitive reorientation; Fine motor coordination activities   Goal Expiration Date 08/27/22   OT Treatment Day 1   OT Frequency 3-5x/wk   Additional Treatment Session   Start Time 5500   End Time 9708   Treatment Assessment Pt greeted for OT treatment to maximize independence with toileting routine  Pt completed rolling L in bed with Mod Ax1 and therapist placed bedpan under Pt  Pt unable to void at this time  Pt requires Max A for hygiene management  Additional Treatment Day 1   Recommendation   OT Discharge Recommendation Post acute rehabilitation services   Additional Comments  The patient's raw score on the AM-PAC Daily Activity inpatient short form low function score is 18, standardized score is Low Function Daily Activity Standardized Score: 30 17  Patients with a standardized score less than 39 4 are likely to benefit from discharge to post-acute rehab services  Please refer to the recommendation of the Occupational Therapist for safe discharge planning     AM-PAC Daily Activity Inpatient   Lower Body Dressing 1   Bathing 2   Toileting 2   Upper Body Dressing 2   Grooming 2   Eating 2   Daily Activity Raw Score 11   Daily Activity Standardized Score (Calc for Raw Score >=11) 29 04   Turning Head Towards Sound 4   Follow Simple Instructions 3   Low Function Daily Activity Raw Score 18   Low Function Daily Activity Standardized Score 30 17   AM-PAC Applied Cognition Inpatient   Following a Speech/Presentation 3   Understanding Ordinary Conversation 4   Taking Medications 3   Remembering Where Things Are Placed or Put Away 3   Remembering List of 4-5 Errands 3   Taking Care of Complicated Tasks 3   Applied Cognition Raw Score 19   Applied Cognition Standardized Score 39 77       Goals (OTR to further assess functional mobility):     Pt will complete UB ADLs at [address] A level in order to maximize safety during ADL participation  Pt will complete LB ADLs at [address] A level in order to return to PLOF  Pt will complete grooming routine at S level in order to maximize participation in ADLs  Pt will be Min A with toileting routine (transfers, hygiene, and clothing management) in order to increase participation in ADLs  Pt will increase dynamic sitting balance to F+ in order to participate in seated ADL tasks  Pt will increase dynamic standing balance to F+ in order to increase participation in unsupported standing ADL tasks  Pt will tolerate x10 minutes of standing with a maximum of 3 rest breaks to complete ADL routine  Pt will complete bed mobility at S level to increase participation in ADLs  Pt will complete functional transfers at [address] [place] [address] level in order to maximize safety and participation in ADLs  Pt will utilize 2 energy conservation techniques at S level to maximize safety and participation in ADLs  Pt will be attentive 100% of time for ongoing functional/formal cognitive assessment to assist with safe DC planning NATALIIA Claudio, OTS

## 2022-08-13 NOTE — CASE MANAGEMENT
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 ([facility] ) 2022    Renal insufficiency     Abnormal nuclear stress test 2021    Diverticulosis of colon 2019    Presence of cardiac pacemaker 2018    Tubulovillous adenoma 2018    Gastroesophageal reflux disease without esophagitis 2018    Essential hypertension 2017    Hyperlipidemia LDL goal <100 2017    Stage 3 chronic kidney disease ([facility] [address] ) 2017    Osteoarthritis of both wrists 2017    Chronic atrial fibrillation ([facility] ) 2017    Cavus deformity of foot 2017    Hallux abducto valgus, bilateral 2017    Lipoma of right upper extremity 2017    Pain due to onychomycosis of toenails of both feet 2017    Allergic rhinitis due to pollen 10/12/2015    Midline cystocele 2014    Osteoarthritis of hip 2014    B12 deficiency 2013    Osteopenia 11/15/2013    Left renal artery stenosis [facility] 2013    Left atrial enlargement 2013      LOS (days): 1  Geometric Mean LOS (GMLOS) (days):   Days to GMLOS:     OBJECTIVE:    Risk of Unplanned Readmission Score: 12 91         Current admission status: Inpatient  Referral Reason: Stroke    Preferred Pharmacy:   [facility]  [address] [address]  Phone: [phone] Fax: [phone]    Primary Care Provider: Altagracia Tripp MD    Primary Insurance: [address] [place] [address],[address] [facility]  Secondary Insurance: Josue Hinton    ASSESSMENT:  Active Health Care Proxies    There are no active Health Care Proxies on file  Readmission Root Cause  30 Day Readmission: No    Patient Information  Admitted from[de-identified] Home  Mental Status: Alert  During Assessment patient was accompanied by: Not accompanied during assessment  Assessment information provided by[de-identified] Patient  Primary Caregiver: Self  Support Systems: Self, Home care staff (Pt  reports she has an agency worker that assists with cooking cleaning, dressing, medical apt transportation and errads M-Th  7 5 hours per day)  [place] of Residence: [address] [place] [address] do you live in?: [facility] entry access options   Select all that apply : Elevator, Stairs (Pt  lives on the 8th fl  and typical uses elevator, but can use steps in event of emergency)  Number of steps to enter home :  (8 flights)  Do the steps have railings?: Yes  Type of Current Residence: Apartment  Floor Level: 8  Upon entering residence, is there a bedroom on the main floor (no further steps)?: Yes  Upon entering residence, is there a bathroom on the main floor (no further steps)?: Yes  In the last 12 months, was there a time when you were not able to pay the mortgage or rent on time?: No  In the last 12 months, was there a time when you did not have a steady place to sleep or slept in a shelter (including now)?: No  Homeless/housing insecurity resource given?: No  Living Arrangements: Lives Alone    Activities of Daily Living Prior to Admission  Functional Status: Assistance  Completes ADLs independently?: No  Level of ADL dependence: Assistance (Pt  reports an agency worker assists w/ cooking and cleaning)  Ambulates independently?: Yes  Does patient use assisted devices?: No  Does patient currently own DME?: No  Does patient have a history of Outpatient Therapy (PT/OT)?: No  Does the patient have a history of Short-Term Rehab?: No  Does patient have a history of HHC?: No         Patient Information Continued  Income Source: SSI/SSD  Does patient receive dialysis treatments?: No  Does patient have a history of substance abuse?: No  Does patient have a history of Mental Health Diagnosis?: No         Means of Transportation  Means of Transport to Appts[de-identified] Other (Comment) (Pt  reports having a person care aide that transport her to medical appts )  In the past 12 months, has lack of transportation kept you from medical appointments or from getting medications?: No  In the past 12 months, has lack of transportation kept you from meetings, work, or from getting things needed for daily living?: No  Was application for public transport provided?: N/A        CM Resident met with Pt  At bedside  Pt  Was alert and discussed eventual discharge planning  Pt  Reports having a personal care assistant through [facility], -  7 5 hours per day  PCA assists with cooking and cleaning in the home, transporting to medical appointments and errands  Pt  Reports that she currently lives [facility] the 8th [facility] but has requested to move to the 1st or 2nd floor since she is concerned about having to evacuate the building via steps for fire alarms multiple times

## 2022-08-13 NOTE — PHYSICAL THERAPY NOTE
Physical Therapy Evaluation    Patient's Name: Jm Morales    Admitting Diagnosis  Cerebral thrombosis with cerebral infarction ([facility] 75 ) [I63 30]  Essential hypertension [facility] (NIH) Stroke Scale level of consciousness score 0, alert; keenly responsive [Z78 9]  Acute CVA (cerebrovascular accident) ([facility] ) [I63 9]    Problem List  Patient Active Problem List   Diagnosis    Essential hypertension    Hyperlipidemia LDL goal <100    Stage 3 chronic kidney disease ([place] [address] )    Osteoarthritis of both wrists    Chronic atrial fibrillation (HCC)    Tubulovillous adenoma    Gastroesophageal reflux disease without esophagitis    Presence of cardiac pacemaker    B12 deficiency    Allergic rhinitis due to pollen    Cavus deformity of foot    Midline cystocele    Hallux abducto valgus, bilateral    Left renal artery stenosis ([facility] )    Osteoarthritis of hip    Osteopenia    Pain due to onychomycosis of toenails of both feet    Left atrial enlargement    Lipoma of right upper extremity    Diverticulosis of colon    Abnormal nuclear stress test    Renal insufficiency    Acute cerebrovascular accident (CVA) ([place] [address] )    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 [facility])       Past Medical History  Past Medical History:   Diagnosis Date    A-fib ([place] [address] )     Anemia     Arthritis     Breast pain, right     Chest pain on breathing     Colon polyp     Cough     Diverticulosis     Encounter for screening colonoscopy     H/O sick sinus syndrome     Heart murmur     High cholesterol     History of atrial fibrillation     Rate ontrolled  On xarelto 15mg (creatinine 50) Followed by Dr Casey Alvarado with Cardiology     History of weight loss     Report loose fitting clothes  Weight stable (3lbs difference)  Last colo showed small tubular adenoma x2  Breast biopsy last showed fibrocystic changes  TSH wnl  Will check cbc, bmp, spep          Hx of long term use of blood thinners     Hypertension     Irregular heart beat     Pacemaker     Preop examination     Shortness of breath     Viral bronchitis        Past Surgical History  Past Surgical History:   Procedure Laterality Date    BREAST BIOPSY Right 08/17/2006    ultrasound guided Percutaneous Needle Core -Benign    CATARACT EXTRACTION      CATARACT EXTRACTION W/ INTRAOCULAR LENS  IMPLANT, BILATERAL      COLONOSCOPY      COLONOSCOPY N/A 5/22/2018    Procedure: COLONOSCOPY;  Surgeon: Karthik Thomas DO;  [facility]; Service: Gastroenterology    Cosjagrutio Redding / Susan Antonio / Yayaa Hagan Right     as a child after a fall    MAMMO STEREOTACTIC BREAST BIOPSY RIGHT (ALL INC) Right 10/2014    benign    NE CMBND ANTERPOST COLPORRAPHY W/CYSTO N/A 3/17/2016    Procedure: COLPORRHAPHY ANTERIOR POSTERIOR ;  Surgeon: Chrissie Gaitan MD;  [facility];  Service: Gynecology    NE COLONOSCOPY FLX DX W/[facility] WHEN PFRMD N/A 4/4/2019    Procedure: COLONOSCOPY;  Surgeon: Karthik Thomas DO;  [facility]; Service: Gastroenterology    NE CYSTOURETHROSCOPY N/A 3/17/2016    Procedure: CYSTOSCOPY;  Surgeon: Chrissie Gaitan MD;  [facility];  Service: Gynecology    NE ESOPHAGOGASTRODUODENOSCOPY TRANSORAL DIAGNOSTIC N/A 4/16/2018    Procedure: EGD AND COLONOSCOPY;  Surgeon: Karthik Thomas DO;  [facility];   Service: Gastroenterology    NE LAP,SURG,COLECTOMY, PARTIAL, W/ANAST Right 1/13/2022    Procedure: Diagnostic laparoscopy, laparscopic right colectomy;  Surgeon: Tessa Stubbs MD;  [facility];  Service: Colorectal    NE REVAGINAL PROLAPSE,SACROSP LIG N/A 3/17/2016    Procedure: COLPOPEXY VAGINAL EXTRAPERITONEAL (VEC) ANTERIOR ;  Surgeon: Chrissie Gaitan MD;  [facility];  Service: Gynecology    NE SLING OPER STRES INCONTINENCE N/A 3/17/2016    Procedure: INSERTION PUBOVAGINAL SLING SINGLE INCISION ;  Surgeon: Anna Hahn Marichuy Doan MD;  [facility];  Service: Gynecology        08/13/22 0938   PT Last Visit   PT Visit Date 08/13/22   Note Type   Note type Evaluation  (+ treatment)   Pain Assessment   Pain Assessment Tool 0-10   Pain Score No Pain   Restrictions/Precautions   Weight Bearing Precautions Per Order No   Other Precautions Chair Alarm; Bed Alarm;Multiple lines;Telemetry; Fall Risk   Home Living   Type of [facility] [facility] [address] One level;Elevator  (elevator to 8th floor)   Bathroom Shower/Tub Tub/shower unit   Bathroom Toilet Standard   Bathroom Equipment Grab bars in shower; Shower chair   Prior Function   Level of [place] Independent with ADLs and functional mobility   Lives With (S)  Alone   Receives Help From Family   ADL Assistance Independent   IADLs   (receives A Mon-Thur for 7 5 hours/day to assist w/ cooking + cleaning)   Falls in the last 6 months 0   Vocational Retired   Comments At baseline pt is I w/ ambulation w/o AD  General   Family/Caregiver Present Yes   Cognition   Arousal/Participation Alert   Orientation Level Oriented X4   Memory Within functional limits   Following Commands Follows one step commands without difficulty   Comments pleasant + cooperative, preferred language is Tajik but does understanding English (requests to be spoken to slowly for improved understanding)   Subjective   Subjective Pt agreeable to mobilize  RLE Assessment   RLE Assessment   (grossly 3+/5)   LLE Assessment   LLE Assessment   (0/5)   Coordination   Movements are Fluid and Coordinated 0   Sensation WFL   Bed Mobility   Rolling R 3  Moderate assistance   Additional items Assist x 1;Bedrails; Increased time required;LE management   Supine to Sit 3  Moderate assistance   Additional items Assist x 2; Increased time required;Verbal cues;LE management; Bedrails   Sit to Supine Unable to assess   Additional Comments Pt greeted in supine     Transfers   Sit to Stand 2  Maximal assistance   Additional items Assist x 2; Increased time required;Verbal cues   Stand to Sit 2  Maximal assistance   Additional items Assist x 2; Increased time required;Verbal cues   Stand pivot 2  Maximal assistance   Additional items Assist x 2; Increased time required;Verbal cues   Additional Comments B HHA   Ambulation/Elevation   Gait pattern L Hemiparesis; Improper Weight shift;Decreased foot clearance;Shuffling;Excessively slow   Gait Assistance 2  Maximal assist   Additional items Assist x 2;Verbal cues; Tactile cues   Assistive Device Other (Comment)  (B HHA)   Distance 3' bed>chair   Balance   Static Sitting Poor +   Dynamic Sitting Poor   Static Standing Zero   Ambulatory Zero  (B HHA)   Endurance Deficit   Endurance Deficit Yes   Endurance Deficit Description L hemiparesis   Activity Tolerance   Activity Tolerance Patient limited by fatigue  (L hemiparesis)   Medical Staff Made Aware Smooth Amaya, OT [facility]   Nurse Made Aware yes - cleared for therapy   Assessment   Prognosis Fair   Problem List Decreased strength;Decreased endurance; Impaired balance;Decreased mobility; Decreased coordination   Assessment Pt is 80 y o  female seen for a high complexity PT evaluation s/p admit to [facility] on 8/12/2022  Pt presenting w/ acute CVA  CTA head/neck shows near occlusive thrombus at the R MCA bifurcation, mild beading of the mid to distal ICA's suspicious for mild fibromuscular dysplasia, and mild atherosclerotic disease at bilateral distal carotid arteries  Please see above for other active problem list / PMH  PT now consulted to assess functional mobility and needs for safe d/c planning  Prior to admission, pt was I w/o AD, lives alone in an apartment w/o stairs  Currently pt requires ModAx2 for bed skills; ModAx2 for functional transfers; ModAx2 for limited ambulation w/ B HHA   Pt presents functioning below baseline and w/ overall mobility deficits 2* to: decreased LLE strength; generalized weakness/deconditioning; decreased endurance; impaired balance; gait deviations; fatigue; impaired safety and judgement; limited insight into current deficits; bed/chair alarms; multiple lines  Pt currently at risk for falls  (Please find additional objective findings from PT assessment regarding body systems outlined above )     Pt will continue to benefit from skilled PT interventions to address stated impairments; to maximize functional potential; for ongoing pt/ family training; and DME needs  PT is currently recommending rehab  Barriers to Discharge Decreased caregiver support   Goals   Patient Goals go to the bathroom   STG Expiration Date 08/27/22   Short Term Goal #1 In 14 days pt will complete: 1) Bed mobility skills with MinAx1 to facilitate safe return to previous living environment  2) Functional transfers with MinAx1 to facilitate safe return to previous living environment  3) Ambulation with least restrictive AD 25' w/ MinAx1 without LOB for safe ambulation in home/community environment  4) Improve balance scores by 1 grade to decrease fall risk  5) Improve LE strength grades by 1 to increase independence w/ all functional mobility, transfers and gait  6) PT for ongoing pt and family education; DME needs and D/C planning to promote highest level of function in least restrictive environment  PT Treatment Day 0   Plan   Treatment/Interventions Functional transfer training;LE strengthening/ROM; Therapeutic exercise; Endurance training;Patient/family training;Bed mobility; Equipment eval/education;Gait training; Compensatory technique education;Spoke to nursing;OT  (balance training)   PT Frequency   (3-6x/wk)   Recommendation   PT Discharge Recommendation Post acute rehabilitation services   AM-PAC Basic Mobility Inpatient   Turning in Bed Without Bedrails 2   Lying on Back to Sitting on Edge of Flat Bed 1   Moving Bed to Chair 1   Standing Up From Chair 1   Walk in Room 1   Climb 3-5 Stairs 1   Basic Mobility Inpatient Raw Score 7 Highest Level Of Mobility   JH-HLM Goal 2: Bed activities/Dependent transfer   -HLM Achieved 4: Move to chair/commode   End of Consult   Patient Position at End of Consult Bedside chair;Bed/Chair alarm activated; All needs within reach  (on waffle cushion, BLE elevated, all lines in tact, SCD's activiated)   Treatment: Extended time spent rolling from side<>side for bed pan placement + hygiene, cues to bend unaffected LE, reach for bed rails    Ranjan Stage, PT, DPT

## 2022-08-14 PROBLEM — I63.511 ARTERIAL ISCHEMIC STROKE, MCA (MIDDLE CEREBRAL ARTERY), RIGHT, ACUTE (HCC): Status: ACTIVE | Noted: 2022-08-12

## 2022-08-14 LAB
AORTIC ROOT: 3.1 CM
AORTIC VALVE MEAN VELOCITY: 13.3 M/S
APICAL FOUR CHAMBER EJECTION FRACTION: 70 %
APTT PPP: 59 SECONDS (ref 23–37)
APTT PPP: 68 SECONDS (ref 23–37)
APTT PPP: 70 SECONDS (ref 23–37)
ASCENDING AORTA: 3.7 CM
AV AREA BY CONTINUOUS VTI: 1.9 CM2
AV AREA PEAK VELOCITY: 1.7 CM2
AV LVOT MEAN GRADIENT: 3 MMHG
AV LVOT PEAK GRADIENT: 5 MMHG
AV MEAN GRADIENT: 9 MMHG
AV PEAK GRADIENT: 18 MMHG
AV REGURGITATION PRESSURE HALF TIME: 425 MS
AV VALVE AREA: 1.9 CM2
AV VELOCITY RATIO: 0.54
DOP CALC AO PEAK VEL: 2.15 M/S
DOP CALC AO VTI: 43.36 CM
DOP CALC LVOT AREA: 3.14 CM2
DOP CALC LVOT DIAMETER: 2 CM
DOP CALC LVOT PEAK VEL VTI: 26.24 CM
DOP CALC LVOT PEAK VEL: 1.16 M/S
DOP CALC LVOT STROKE INDEX: 48.8 ML/M2
DOP CALC LVOT STROKE VOLUME: 82.39
FRACTIONAL SHORTENING: 32 (ref 28–44)
INTERVENTRICULAR SEPTUM IN DIASTOLE (PARASTERNAL SHORT AXIS VIEW): 2 CM
INTERVENTRICULAR SEPTUM: 2 CM (ref 0.6–1.1)
LAAS-AP2: 35.6 CM2
LAAS-AP4: 38.2 CM2
LEFT ATRIUM SIZE: 5.3 CM
LEFT INTERNAL DIMENSION IN SYSTOLE: 2.3 CM (ref 2.1–4)
LEFT VENTRICULAR INTERNAL DIMENSION IN DIASTOLE: 3.4 CM (ref 3.5–6)
LEFT VENTRICULAR POSTERIOR WALL IN END DIASTOLE: 1.6 CM
LEFT VENTRICULAR STROKE VOLUME: 30 ML
LVSV (TEICH): 30 ML
RA PRESSURE ESTIMATED: 10 MMHG
RIGHT ATRIAL 2D VOLUME: 72 ML
RIGHT ATRIUM AREA SYSTOLE A4C: 23.5 CM2
RIGHT VENTRICLE ID DIMENSION: 3.4 CM
RV PSP: 64 MMHG
SL CV AV DECELERATION TIME RETROGRADE: 1465 MS
SL CV AV PEAK GRADIENT RETROGRADE: 82 MMHG
SL CV LEFT ATRIUM LENGTH A2C: 7.8 CM
SL CV LV EF: 65
SL CV PED ECHO LEFT VENTRICLE DIASTOLIC VOLUME (MOD BIPLANE) 2D: 49 ML
SL CV PED ECHO LEFT VENTRICLE SYSTOLIC VOLUME (MOD BIPLANE) 2D: 19 ML
TR MAX PG: 54 MMHG
TR PEAK VELOCITY: 3.7 M/S
TRICUSPID VALVE PEAK REGURGITATION VELOCITY: 3.68 M/S

## 2022-08-14 PROCEDURE — 85730 THROMBOPLASTIN TIME PARTIAL: CPT

## 2022-08-14 PROCEDURE — 85730 THROMBOPLASTIN TIME PARTIAL: CPT | Performed by: PSYCHIATRY & NEUROLOGY

## 2022-08-14 PROCEDURE — 99232 SBSQ HOSP IP/OBS MODERATE 35: CPT | Performed by: PSYCHIATRY & NEUROLOGY

## 2022-08-14 RX ORDER — DABIGATRAN ETEXILATE 150 MG/1
150 CAPSULE ORAL 2 TIMES DAILY
Qty: 60 CAPSULE | Refills: 0 | Status: SHIPPED | OUTPATIENT
Start: 2022-08-14 | End: 2022-10-05

## 2022-08-14 RX ORDER — PRAVASTATIN SODIUM 40 MG
40 TABLET ORAL
Status: DISCONTINUED | OUTPATIENT
Start: 2022-08-14 | End: 2022-08-19 | Stop reason: HOSPADM

## 2022-08-14 RX ORDER — ACETAMINOPHEN 325 MG/1
650 TABLET ORAL EVERY 6 HOURS PRN
Status: DISCONTINUED | OUTPATIENT
Start: 2022-08-14 | End: 2022-08-19 | Stop reason: HOSPADM

## 2022-08-14 RX ADMIN — HEPARIN SODIUM 17 UNITS/KG/HR: 10000 INJECTION, SOLUTION INTRAVENOUS at 12:16

## 2022-08-14 RX ADMIN — PRAVASTATIN SODIUM 40 MG: 40 TABLET ORAL at 16:30

## 2022-08-14 NOTE — PROGRESS NOTES
Pastoral Care Progress Note    2022  Patient: Hayder Carey : 1940  Admission Date & Time: 2022 0935  MRN: [id number] CSN: [id number]                     Chaplaincy Interventions Utilized:   Empowerment: Provided anxiety containment    Exploration: Explored hope    Collaboration: Facilitated respect for spiritual/cultural practice during hospitalization    Relationship Building: Listened empathically    Ritual: Provided prayer            Chaplaincy Outcomes Achieved:  Catharsis          Spiritual Coping Strategies Utilized:   Spiritual practices, Spiritual comfort, Spiritual empowerment, Spiritual growth or transformation, Spiritual gratitude, and Spiritual meaning       22 1200   Clinical Encounter Type   Visited With Patient   Routine Visit Introduction   Continue Visiting Yes   Referral From Patient;Nurse   Referral To    Consult Patient care   Mormon Encounters   Mormon Needs Prayer   Sacramental Encounters   Communion Patient wants communion  (Left a message with Father Otto Guerra)   Patient Spiritual Encounters   Spiritual Assessment 5   Suffering Severity 4   Fear Level 4   Feelings of Loneliness provided spiritual support   Coping 5     Provided spiritual and emotional support, especially spiritual empowerment, hope and also provided prayer

## 2022-08-14 NOTE — DISCHARGE SUMMARY
NEUROLOGY RESIDENCY - DISCHARGE SUMMARY     Name: Pankaj Vidales   Age & Sex: 80 y o  female   MRN: [id number]  Unit/Bed#: TJRE 997-80   Encounter: [id number]     Discharging Resident Physician: Emily Torres MD  Attending: Carmen Luther MD  PCP: Lu Lal MD  Admission Date: 8/12/2022  Discharge Date: 08/19/22    Kandace Mckeon need follow up in in 6 weeks with neurovascular Attending/AP/Resident  She will require CTA h/n in 3 months to re-evaluate R MCA bifurcation thrombus  ASSESSMENT & PLAN     * Arterial ischemic stroke, MCA (middle cerebral artery), right, acute (HCC)  Assessment & Plan  80 y o  F presented to [facility] on [facility]/12/2022 as a stroke alert  Presenting deficits: contracted and weak LUE, flaccid LLE, left facial droop, mild dysarthria, and mild sensory deficits on the left side  Upon arrival to the ED, /80 on arrival   NIHSS 11  CTA h/n revealed near occlusive thormbus at R MCA bifurcation  Not a TNK candidate due to Xarelto use the night prior; not a thrombectomy candidate per Neurosurgery  Was started on stroke protocol heparin gtt and admitted on the stroke pathway  Pertinent PMHx: Afib on Xarelto, sick sinus syndrome s/p pacemaker (not MRI conditional), HTN, and HLD      [place]:    · Lipid Panel: Cholesterol 100, LDL 38   · Hemoglobin A1c 5 4  · TTE: Left Atrium: moderately dilated  · CTH: focal area of gliosis within the high right postcentral gyrus and scattered chronic microvascular ischemic changes with more focal lucency along the anterior limb of the right internal capsule, but no acute hemorrhage  · CTA h/n: near occlusive thrombus at the right MCA bifurcation, mild beading of the mid to distal ICAs suspicious for mild fibromuscular dysplasia, and mild atherosclerotic disease at bilateral distal carotid arteries      · CTP: 8 mL mismatch  · Repeat [facility] 8/12 revealed subtle loss of gray-white matter differentiation in the right temporal lobe and insular cortex in keeping with right MCA territory infarction    - Unable to get MRI as pacemaker is not compatible     Exam: brisk reflexes in LUE, spasticity in LUE, LLE, hemiplegia in LUE, LLE  Impression: Etiology remains unclear, but most likely due to Xarelto failure in the setting of known afib  No known cancer or hypercoagulable history  Plan:  · Continue Pradaxa 150 mg BID for AC (was on xarelto previously; discontinue)  · Previously on Crestor 10 mg, recommend to decrease to Crestor 5 mg (LDL 38)  · In the hospital, was on alternative pravastatin 40mg equivalent to crestor 5mg  · BP goals: normotension; cautiously restarted home meds  · Euglycemic, normothermic goal  · F/u with Neurology outpatient in 6 weeks  · F/u with PCP within 1 week of discharge  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 [facility])  Assessment & Plan  · CTA head/neck - near occlusive thrombus at the R MCA bifurcation, mild beading of the mid to distal ICAs suspicious for mild fibromuscular dysplasia, and mild atherosclerotic disease at bilateral distal carotid arteries  · Switch AC to Pradaxa on 8/15  · Will need CTA h/n in 3 months to re-evaluate thrombus as outpatient    Chronic atrial fibrillation (HCC)  Assessment & Plan  · On Xarelto 15 mg daily QHS  No missed doses  Last dose on 8/11 before going to bed    · Was on stroke protocol heparin gtt (PTT goal 50-70)  · Switched to Pradaxa since 8/15    Essential hypertension  Assessment & Plan  · Continue home meds (Verapamil 240 mg QHS, Spirono 25 mg QD, Cozaar 100 mg QD, Lasix 20 mg QD)   · Advise regular f/u with PCP as outpatient    Hyperlipidemia LDL goal <100  Assessment & Plan  · Upon DC, recommend Crestor 5 mg daily (previously on 10 mg at home)   · Was maintained on pravastatin 40 mg equivalent to Crestor 5 mg in the hospital    Disposition:     Other: [facility]    Reason for Admission: stroke    Consultations During Hospital Stay:  · PM&R  · Neurology primary patient    Procedures Performed:     Significant Findings / Test Results:     CTA head and neck w wo contrast   Final Result by Carolin Hernandez MD (08/15 1253)      Evolving right MCA distribution infarct  No evidence for secondary parenchymal hematoma  Interval recanalization of previously seen thrombus at the right MCA bifurcation  Middle cerebral arteries are patent on the current exam without evidence for major branch vessel occlusion  Stable beading of the mid to distal internal carotid arteries again suggestive of mild fibromuscular dysplasia  Workstation performed: [id number]         CT head wo contrast   Final Result by Marilyn Carvajal MD (08/13 1301)      Interval development of subtle loss of gray-white matter differentiation in the right temporal lobe and insular cortex in keeping with right MCA territory infarction (series 2 images 11-14 )  There is no hemorrhagic conversion  Workstation performed: [id number]         CT cerebral perfusion   Final Result by Carolin Hernandez MD (08/12 1108)      CT perfusion performed  Data available on PACS  Workstation performed: [id number]         CTA stroke alert (head/neck)   Final Result by Carolin Hernandez MD (08/12 1032)      Near occlusive thrombus at the right MCA bifurcation  Mild beading of the mid to distal internal carotid arteries suspicious for mild fibromuscular dysplasia  Atherosclerotic plaque at the carotid bulbs without evidence for high-grade stenosis or focal occlusion of the cervical vasculature  Partially imaged pulmonary edema and layering bilateral pleural effusions  Findings were directly discussed with Maria Dolores Patel at 10:00 AM                         Workstation performed: [id number]         CT stroke alert brain   Final Result by Carolin Hernandez MD (08/12 1021)      No definite CT evidence for large acute vascular distribution infarct or acute intracranial hemorrhage        Findings were directly discussed with Mer Davey at 10:00 AM          Workstation performed: [id number]         CTA head and neck w wo contrast    (Results Pending)      TTE demonstrated bilateral atrial dilation, aortic regurgitation, and aortic stenosis    Incidental Findings:     Test Results Pending at Discharge (will require follow up): Outpatient Tests Requested:  · CTA h/n in 3 months     Complications:      Hospital Course: Chalino Trujillo is a 80 y o  female patient with PMH afib on Xarelto with MRI-incompatible pacemaker, HLD, HTN who originally presented to the hospital on 8/12/2022 due to left-sided weakness, facial droop, dysarthria, and sensory loss with last known well 8/11 2300  Initial NIHSS 11  CT head and CTA imaging consistent with right MCA thrombotic ischemic stroke  CT perfusion demonstrated 8cc penumbra, 0cc core, infinite mismatch  Patient was started on heparin gtt therapy because she was not a candidate for TNK treatment nor thrombectomy  TTE demonstrated bilateral atrial dilation, aortic regurgitation, and aortic stenosis  Clinical improvement of facial droop, dysarthria, and sensation throughout  Remaining weakness and paralysis of LLE and spasticity of LUE  Repeat CTA head/neck 8/15 demonstrated patent MCA bilaterally, no secondary parenchymal hematoma  Discontinued Xarelto, started patient on Pradaxa  Etiology remains unclear, but suspected secondary to Xarelto failure, no known cancer of hypercoagulable history  On discharge, to complete rehab at [facility] ER  She will continue on Pradaxa and follow up with Stroke Clinic in 4-6 weeks  Of note, patient required a letter for requesting a first floor apartment, which was completed and given to CM team during hospitalization  Patient is also advised to f/u with PCP within 1 week of discharge  Condition at Discharge: fair     Discharge Day Visit / Exam:     Subjective:  Patient feels well with no new complaints   She says occasionally during the night she feels a pain in her leg, so she tries to move it but is unable to  She is not currently in any pain  She denies headaches, dizziness, fever, or chills  She is able to urinate better today  Vitals: Blood Pressure: 127/51 (08/19/22 0757)  Pulse: 63 (08/19/22 0757)  Temperature: 97 7 °F (36 5 °C) (08/19/22 0757)  Temp Source: Oral (08/14/22 2150)  Respirations: 19 (08/18/22 1507)  Height: 5' 3" (160 cm) (08/13/22 1230)  Weight - Scale: 61 9 kg (136 lb 7 4 oz) (08/19/22 0542)  SpO2: 93 % (08/19/22 0757)    Physical exam:  Vitals reviewed  General Examination: No distress, cooperative  CVS: S1, S2 noted       Neurological exam:     Mental Status  A, Ox3  Follows simple, 3 step commands  Speech: fluent, normal rhythm, no dysarthria       Cranial Nerves  CN II: Visual fields full to confrontation  CN III, IV, VI: EOMI bilaterally  Normal lids and orbits bilaterally  Pupils equal round and reactive to light bilaterally  No nystagmus  CN V: Facial sensation is normal   CN VII:  Right: There is no facial weakness or asymmetry  Left: There is no facial weakness or asymmetry  CN VIII: Audition intact to finger rub bilaterally  CN IX, X: Uvula midline  Palate elevates symmetrically  CN XI:  Right: Sternocleidomastoid strength is normal  Trapezius strength is normal   Left: Sternocleidomastoid strength is normal  Trapezius strength is normal   CN XII: Tongue midline without atrophy or fasciculations with appropriate movement      Motor  Spasticity, hemiplegia in left upper, lower extremities  5/5 strength in right upper, lower extremities throughout      Sensory   RUE, RLE: light touch, pinprick sensation preserved  LUE, LLE: decreased light touch, pinprick sensations      Reflexes Right Left   Brachioradialis     +2  +3   Biceps                                   +2  +3   Triceps  +2  +3   Patellar   +3  +3      Ankle clonus absent b/l     Plantars mute bilaterally      Coordination  Finger-to-nose-normal on the right       Gait  Deferred    Discussion with Family: son, daughter    Discharge instructions/Information to patient and family:   See after visit summary for information provided to patient and family  Provisions for Follow-Up Care:  See after visit summary for information related to follow-up care and any pertinent home health orders  Planned Readmission: none; [facility]     Discharge Statement:  I spent 30 minutes discharging the patient  This time was spent on the day of discharge  I had direct contact with the patient on the day of discharge  Greater than 50% of the total time was spent examining patient, answering all patient questions, arranging and discussing plan of care with patient as well as directly providing post-discharge instructions  Additional time then spent on discharge activities  Discharge Medications:  See after visit summary for reconciled discharge medications provided to patient and family        ** Please Note: This note has been constructed using a voice recognition system **      ==  MD Shilpi Boggs's Neurology Residency, PGY-II

## 2022-08-14 NOTE — PLAN OF CARE
Problem: Prexisting or High Potential for Compromised Skin Integrity  Goal: Skin integrity is maintained or improved  Description: INTERVENTIONS:  - Identify patients at risk for skin breakdown  - Assess and monitor skin integrity  - Assess and monitor nutrition and hydration status  - Monitor labs   - Assess for incontinence   - Turn and reposition patient  - Assist with mobility/ambulation  - Relieve pressure over bony prominences  - Avoid friction and shearing  - Provide appropriate hygiene as needed including keeping skin clean and dry  - Evaluate need for skin moisturizer/barrier cream  - Collaborate with interdisciplinary team   - Patient/family teaching  - Consider wound care consult   Outcome: Progressing     Problem: MOBILITY - ADULT  Goal: Maintain or return to baseline ADL function  Description: INTERVENTIONS:  -  Assess patient's ability to carry out ADLs; assess patient's baseline for ADL function and identify physical deficits which impact ability to perform ADLs (bathing, care of mouth/teeth, toileting, grooming, dressing, etc )  - Assess/evaluate cause of self-care deficits   - Assess range of motion  - Assess patient's mobility; develop plan if impaired  - Assess patient's need for assistive devices and provide as appropriate  - Encourage maximum independence but intervene and supervise when necessary  - Involve family in performance of ADLs  - Assess for home care needs following discharge   - Consider OT consult to assist with ADL evaluation and planning for discharge  - Provide patient education as appropriate  Outcome: Progressing  Goal: Maintains/Returns to pre admission functional level  Description: INTERVENTIONS:  - Perform BMAT or MOVE assessment daily    - Set and communicate daily mobility goal to care team and patient/family/caregiver  - Collaborate with rehabilitation services on mobility goals if consulted  - Perform Range of Motion 22 times a day    - Reposition patient every 2 hours   - Dangle patient 2 times a day  - Stand patient 2 times a day  - Ambulate patient 2 times a day  - Out of bed to chair 2 times a day   - Out of bed for meals 2 times a day  - Out of bed for toileting  - Record patient progress and toleration of activity level   Outcome: Progressing     Problem: PAIN - ADULT  Goal: Verbalizes/displays adequate comfort level or baseline comfort level  Description: Interventions:  - Encourage patient to monitor pain and request assistance  - Assess pain using appropriate pain scale  - Administer analgesics based on type and severity of pain and evaluate response  - Implement non-pharmacological measures as appropriate and evaluate response  - Consider cultural and social influences on pain and pain management  - Notify physician/advanced practitioner if interventions unsuccessful or patient reports new pain  Outcome: Progressing     Problem: INFECTION - ADULT  Goal: Absence or prevention of progression during hospitalization  Description: INTERVENTIONS:  - Assess and monitor for signs and symptoms of infection  - Monitor lab/diagnostic results  - Monitor all insertion sites, i e  indwelling lines, tubes, and drains  - Monitor endotracheal if appropriate and nasal secretions for changes in amount and color  - [place] appropriate cooling/warming therapies per order  - Administer medications as ordered  - Instruct and encourage patient and family to use good hand hygiene technique  - Identify and instruct in appropriate isolation precautions for identified infection/condition  Outcome: Progressing  Goal: Absence of fever/infection during neutropenic period  Description: INTERVENTIONS:  - Monitor WBC    Outcome: Progressing     Problem: SAFETY ADULT  Goal: Maintain or return to baseline ADL function  Description: INTERVENTIONS:  -  Assess patient's ability to carry out ADLs; assess patient's baseline for ADL function and identify physical deficits which impact ability to perform ADLs (bathing, care of mouth/teeth, toileting, grooming, dressing, etc )  - Assess/evaluate cause of self-care deficits   - Assess range of motion  - Assess patient's mobility; develop plan if impaired  - Assess patient's need for assistive devices and provide as appropriate  - Encourage maximum independence but intervene and supervise when necessary  - Involve family in performance of ADLs  - Assess for home care needs following discharge   - Consider OT consult to assist with ADL evaluation and planning for discharge  - Provide patient education as appropriate  Outcome: Progressing  Goal: Maintains/Returns to pre admission functional level  Description: INTERVENTIONS:  - Perform BMAT or MOVE assessment daily    - Set and communicate daily mobility goal to care team and patient/family/caregiver  - Collaborate with rehabilitation services on mobility goals if consulted  - Perform Range of Motion 2 times a day  - Reposition patient every 2 hours    - Dangle patient 2 times a day  - Stand patient 2 times a day  - Ambulate patient 2 times a day  - Out of bed to chair 2 times a day   - Out of bed for meals 2 times a day  - Out of bed for toileting  - Record patient progress and toleration of activity level   Outcome: Progressing  Goal: Patient will remain free of falls  Description: INTERVENTIONS:  - Educate patient/family on patient safety including physical limitations  - Instruct patient to call for assistance with activity   - Consult OT/PT to assist with strengthening/mobility   - Keep Call bell within reach  - Keep bed low and locked with side rails adjusted as appropriate  - Keep care items and personal belongings within reach  - Initiate and maintain comfort rounds  - Make Fall Risk Sign visible to staff  - Offer Toileting every 2 Hours, in advance of need  - Initiate/Maintain 2alarm  - Obtain necessary fall risk management equipment: 2  - Apply yellow socks and bracelet for high fall risk patients  - Consider moving patient to room near nurses station  Outcome: Progressing     Problem: DISCHARGE PLANNING  Goal: Discharge to home or other facility with appropriate resources  Description: INTERVENTIONS:  - Identify barriers to discharge w/patient and caregiver  - Arrange for needed discharge resources and transportation as appropriate  - Identify discharge learning needs (meds, wound care, etc )  - Arrange for interpretive services to assist at discharge as needed  - Refer to Case Management Department for coordinating discharge planning if the patient needs post-hospital services based on physician/advanced practitioner order or complex needs related to functional status, cognitive ability, or social support system  Outcome: Progressing     Problem: Potential for Falls  Goal: Patient will remain free of falls  Description: INTERVENTIONS:  - Educate patient/family on patient safety including physical limitations  - Instruct patient to call for assistance with activity   - Consult OT/PT to assist with strengthening/mobility   - Keep Call bell within reach  - Keep bed low and locked with side rails adjusted as appropriate  - Keep care items and personal belongings within reach  - Initiate and maintain comfort rounds  - Make Fall Risk Sign visible to staff  - Offer Toileting every 2 Hours, in advance of need  - Initiate/Maintain 2alarm  - Obtain necessary fall risk management equipment: 2  - Apply yellow socks and bracelet for high fall risk patients  - Consider moving patient to room near nurses station  Outcome: Progressing

## 2022-08-14 NOTE — PLAN OF CARE
Problem: Prexisting or High Potential for Compromised Skin Integrity  Goal: Skin integrity is maintained or improved  Description: INTERVENTIONS:  - Identify patients at risk for skin breakdown  - Assess and monitor skin integrity  - Assess and monitor nutrition and hydration status  - Monitor labs   - Assess for incontinence   - Turn and reposition patient  - Assist with mobility/ambulation  - Relieve pressure over bony prominences  - Avoid friction and shearing  - Provide appropriate hygiene as needed including keeping skin clean and dry  - Evaluate need for skin moisturizer/barrier cream  - Collaborate with interdisciplinary team   - Patient/family teaching  - Consider wound care consult   Outcome: Progressing     Problem: MOBILITY - ADULT  Goal: Maintain or return to baseline ADL function  Description: INTERVENTIONS:  -  Assess patient's ability to carry out ADLs; assess patient's baseline for ADL function and identify physical deficits which impact ability to perform ADLs (bathing, care of mouth/teeth, toileting, grooming, dressing, etc )  - Assess/evaluate cause of self-care deficits   - Assess range of motion  - Assess patient's mobility; develop plan if impaired  - Assess patient's need for assistive devices and provide as appropriate  - Encourage maximum independence but intervene and supervise when necessary  - Involve family in performance of ADLs  - Assess for home care needs following discharge   - Consider OT consult to assist with ADL evaluation and planning for discharge  - Provide patient education as appropriate  Outcome: Progressing  Goal: Maintains/Returns to pre admission functional level  Description: INTERVENTIONS:  - Perform BMAT or MOVE assessment daily    - Set and communicate daily mobility goal to care team and patient/family/caregiver  - Collaborate with rehabilitation services on mobility goals if consulted  - Perform Range of Motion 3 times a day    - Reposition patient every 2 hours   - Dangle patient 3 times a day    - Out of bed for toileting  - Record patient progress and toleration of activity level   Outcome: Progressing     Problem: PAIN - ADULT  Goal: Verbalizes/displays adequate comfort level or baseline comfort level  Description: Interventions:  - Encourage patient to monitor pain and request assistance  - Assess pain using appropriate pain scale  - Administer analgesics based on type and severity of pain and evaluate response  - Implement non-pharmacological measures as appropriate and evaluate response  - Consider cultural and social influences on pain and pain management  - Notify physician/advanced practitioner if interventions unsuccessful or patient reports new pain  Outcome: Progressing     Problem: INFECTION - ADULT  Goal: Absence or prevention of progression during hospitalization  Description: INTERVENTIONS:  - Assess and monitor for signs and symptoms of infection  - Monitor lab/diagnostic results  - Monitor all insertion sites, i e  indwelling lines, tubes, and drains  - Monitor endotracheal if appropriate and nasal secretions for changes in amount and color  - [place] appropriate cooling/warming therapies per order  - Administer medications as ordered  - Instruct and encourage patient and family to use good hand hygiene technique  - Identify and instruct in appropriate isolation precautions for identified infection/condition  Outcome: Progressing  Goal: Absence of fever/infection during neutropenic period  Description: INTERVENTIONS:  - Monitor WBC    Outcome: Progressing     Problem: SAFETY ADULT  Goal: Maintain or return to baseline ADL function  Description: INTERVENTIONS:  -  Assess patient's ability to carry out ADLs; assess patient's baseline for ADL function and identify physical deficits which impact ability to perform ADLs (bathing, care of mouth/teeth, toileting, grooming, dressing, etc )  - Assess/evaluate cause of self-care deficits   - Assess range of motion  - Assess patient's mobility; develop plan if impaired  - Assess patient's need for assistive devices and provide as appropriate  - Encourage maximum independence but intervene and supervise when necessary  - Involve family in performance of ADLs  - Assess for home care needs following discharge   - Consider OT consult to assist with ADL evaluation and planning for discharge  - Provide patient education as appropriate  Outcome: Progressing  Goal: Maintains/Returns to pre admission functional level  Description: INTERVENTIONS:  - Perform BMAT or MOVE assessment daily    - Set and communicate daily mobility goal to care team and patient/family/caregiver  - Collaborate with rehabilitation services on mobility goals if consulted  - Perform Range of Motion 3 times a day  - Reposition patient every 2 hours    - Dangle patient 3 times a day    - Out of bed for toileting  - Record patient progress and toleration of activity level   Outcome: Progressing  Goal: Patient will remain free of falls  Description: INTERVENTIONS:  - Educate patient/family on patient safety including physical limitations  - Instruct patient to call for assistance with activity   - Consult OT/PT to assist with strengthening/mobility   - Keep Call bell within reach  - Keep bed low and locked with side rails adjusted as appropriate  - Keep care items and personal belongings within reach  - Initiate and maintain comfort rounds  - Make Fall Risk Sign visible to staff  - Offer Toileting every 2 Hours, in advance of need  - Initiate/Maintain bed alarm    - Apply yellow socks and bracelet for high fall risk patients  - Consider moving patient to room near nurses station  Outcome: Progressing     Problem: DISCHARGE PLANNING  Goal: Discharge to home or other facility with appropriate resources  Description: INTERVENTIONS:  - Identify barriers to discharge w/patient and caregiver  - Arrange for needed discharge resources and transportation as appropriate  - Identify discharge learning needs (meds, wound care, etc )  - Arrange for interpretive services to assist at discharge as needed  - Refer to Case Management Department for coordinating discharge planning if the patient needs post-hospital services based on physician/advanced practitioner order or complex needs related to functional status, cognitive ability, or social support system  Outcome: Progressing     Problem: Knowledge Deficit  Goal: Patient/family/caregiver demonstrates understanding of disease process, treatment plan, medications, and discharge instructions  Description: Complete learning assessment and assess knowledge base    Interventions:  - Provide teaching at level of understanding  - Provide teaching via preferred learning methods  Outcome: Progressing     Problem: Potential for Falls  Goal: Patient will remain free of falls  Description: INTERVENTIONS:  - Educate patient/family on patient safety including physical limitations  - Instruct patient to call for assistance with activity   - Consult OT/PT to assist with strengthening/mobility   - Keep Call bell within reach  - Keep bed low and locked with side rails adjusted as appropriate  - Keep care items and personal belongings within reach  - Initiate and maintain comfort rounds  - Make Fall Risk Sign visible to staff  - Offer Toileting every 2 Hours, in advance of need  - Initiate/Maintain bed alarm  -   - Apply yellow socks and bracelet for high fall risk patients  - Consider moving patient to room near nurses station  Outcome: Progressing

## 2022-08-14 NOTE — PROGRESS NOTES
NEUROLOGY RESIDENCY PROGRESS NOTE     Name: Wisam Tavera   Age & Sex: 80 y o  female   MRN: [id number]  [facility]   Encounter: [id number]    Wisam Tavera will need follow up in in 6 weeks with neurovascular Attending/AP/Resident  ASSESSMENT & PLAN     * Arterial ischemic stroke, MCA (middle cerebral artery), right, acute (HCC)  Assessment & Plan  80 y o   female with Afib on Xarelto, sick sinus syndrome s/p pacemaker (not MRI conditional), HTN, and HLD who presented to [facility] on 8/12/2022 as a stroke alert for contracted and weak LUE, flaccid LLE, left facial droop, mild dysarthria, and mild sensory deficits on the left side  Upon arrival to the ED, /80 on arrival   NIHSS 11  CTA revealed near occlusive thormbus at R MCA Bifurcation  Patient was not a TNK candidate due to bleeding risk from taking Xarelto last night, and she was not a thrombectomy candidate per Dr Evgeny Srivastava  Patient was started on stroke protocol heparin gtt and admitted on the stroke pathway  W/U:   - Lipid Panel: Cholesterol 100, LDL 38   - Hemoglobin A1c 5 4    Imaging:  - TTE:   Left Atrium: The atrium is moderately dilated    Right Atrium: The atrium is mildly dilated    Aortic Valve: There is moderate regurgitation  There is mild stenosis  - CT head shows focal area of gliosis within the high right postcentral gyrus and scattered chronic microvascular ischemic changes with more focal lucency along the anterior limb of the right internal capsule, but no acute hemorrhage     - CTA head/neck shows near occlusive thrombus at the right MCA bifurcation, mild beading of the mid to distal ICAs suspicious for mild fibromuscular dysplasia, and mild atherosclerotic disease at bilateral distal carotid arteries      - CT cerebral perfusion shows 8 mL mismatch     - Repeat CTH 8/12 revealed subtle loss of gray-white matter differentiation in the right temporal lobe and insular cortex in keeping with right MCA territory infarction    - Unable to get MRI as pacemaker is not compatible     Patient continues to have left sided weakness, however no longer has dysarthria, facial droop or sensory deficits  Etiology for stroke remains unclear, but concern for Afib with Xarelto failure (no missed doses of Xarelto)  No known cancer or other hypercoagulable history  Plan:  - Stroke pathway  - Started on stroke protocol heparin gtt (PTT goal 50-70); hold home Xarelto  - will eventually transition to pradaxa vs eliquis (sent to pharmacy and will need to price check w/ CM)  - Previously on Crestor 10 mg, recommend to decrease to Crestor 5 mg (LDL 38)   - changed to alternative pravastatin 40mg equivalent to crestor 5mg  - Recommend repeat CTA head/neck on Monday 8/15   - TTE did show mild left atrial dilation   - Permissive HTN, labetalol if SBP >180 (lower SBP goal due to heparin gtt)  - Euglycemic, normothermic goal  - Continue telemetry  - PT/OT/ST  - Stroke education  - Frequent neuro checks  Continue to monitor and notify neurology with any changes   - STAT CT head for any acute change in neuro exam    Plan discussed with Attending Neurologist, please see attestation for further input/recommendations  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 [facility])  Assessment & Plan  - CTA head/neck shows near occlusive thrombus at the right MCA bifurcation, mild beading of the mid to distal ICAs suspicious for mild fibromuscular dysplasia, and mild atherosclerotic disease at bilateral distal carotid arteries  - Attending Neurologist discussed the case with Neuro IR, and no mechanical thrombectomy warranted at this time  - Patient started on stroke protocol heparin gtt    Chronic atrial fibrillation (HCC)  Assessment & Plan  - On Xarelto 15 mg daily QHS  No missed doses  Last dose on 8/11 before going to bed    - Currently on stroke protocol heparin gtt (PTT goal 50-70)  - May need to consider transitioning to a different anticoagulant such as Eliquis or Pradaxa pending pressure check is management    Hyperlipidemia LDL goal <100  Assessment & Plan  - Recommend Crestor 5 mg daily (previously on 10 mg at home)   - on pravastatin 40 mg equivalent to Crestor 5 mg    Essential hypertension  Assessment & Plan  - Permissive HTN at this time, with SBP goal <160-180            SUBJECTIVE     Patient was seen and examined  No acute events overnight  Patient was sitting comfortably without any concerns  She still has the left-sided weakness and speech is without any slurring and no facial droop is appreciated  She does whether her left improved she understands and rehab will be very key for improvement  Review of Systems   Constitutional: Negative for chills and fever  HENT: Negative for ear pain and sore throat  Eyes: Negative for pain and visual disturbance  Respiratory: Negative for cough and shortness of breath  Cardiovascular: Negative for chest pain and palpitations  Gastrointestinal: Negative for abdominal pain and vomiting  Genitourinary: Negative for dysuria and hematuria  Musculoskeletal: Negative for arthralgias and back pain  Skin: Negative for color change and rash  Neurological: Negative for seizures and syncope  All other systems reviewed and are negative  OBJECTIVE     Patient ID: Brook Lu is a 80 y o  female  Vitals:    22 0630 22 1230 22 0100 22 0552   BP: 164/74 164/74 162/72    BP Location:   Right arm    Pulse: 70 72 72    Resp:   19    Temp:   98 3 °F (36 8 °C)    TempSrc:   Oral    SpO2: 93%  93%    Weight:  65 3 kg (144 lb)  63 5 kg (139 lb 15 9 oz)   Height:  5' 3" (1 6 m)        Temperature:   Temp (24hrs), Av 3 °F (36 8 °C), Min:98 3 °F (36 8 °C), Max:98 3 °F (36 8 °C)    Temperature: 98 3 °F (36 8 °C)      Physical Exam  Vitals and nursing note reviewed  Constitutional:       General: She is not in acute distress  Appearance: She is well-developed  HENT:      Head: Normocephalic and atraumatic  Eyes:      Conjunctiva/sclera: Conjunctivae normal    Cardiovascular:      Rate and Rhythm: Normal rate and regular rhythm  Heart sounds: No murmur heard  Pulmonary:      Effort: Pulmonary effort is normal  No respiratory distress  Breath sounds: Normal breath sounds  Abdominal:      Palpations: Abdomen is soft  Tenderness: There is no abdominal tenderness  Musculoskeletal:      Cervical back: Neck supple  Skin:     General: Skin is warm and dry  Neurological:      Mental Status: She is alert  Neurologic Exam      Physical exam:  Vitals reviewed  General Examination: No distress, cooperative  CVS: S1, S2 noted  Regular rate, rhythm  Neurological exam:    Mental Status  A, Ox3  Follows simple, 3 step commands  Speech: fluent, normal rhythm, no dysarthria  Cranial Nerves  CN II: Visual fields full to confrontation  CN III, IV, VI: EOMI bilaterally  Normal lids and orbits bilaterally  Pupils equal round and reactive to light bilaterally  No nystagmus  CN V: Facial sensation is normal   CN VII:  Right: There is no facial weakness or asymmetry  Left: There is no facial weakness or asymmetry  CN VIII: Audition intact to finger rub bilaterally  CN IX, X: Uvula midline  Palate elevates symmetrically  CN XI:  Right: Sternocleidomastoid strength is normal  Trapezius strength is normal   Left: Sternocleidomastoid strength is normal  Trapezius strength is normal   CN XII: Tongue midline without atrophy or fasciculations with appropriate movement  Motor  Increased tone in left upper extremity and lower extremity  No fasciculations present       Noted fixation left upper extremity, left upper extremity also is noted to be contracted  Left upper extremity out of 5  strength unable to lift antigravity 2/5  Isolated motor testing biceps was 4 out 5, triceps 3 out of, deltoids 2/5  Lower extremity unable to lift antigravity or wiggle toes        Sensory  Upper extremities and lower extremities intact  Light touch    Reflexes Right Left   Brachioradialis     +2  +2   Biceps                                   +2  +2   Triceps  +2  +2   Patellar   +2  +2   Achilles   +2  +2     Crossed adductor absent  Ankle clonus absent b/l  Plantars down b/l  Coordination  Finger-to-nose, rapid alternating movement on right side  Patient unable to do finger-to-nose heel-to-shin on the left upper or left lower extremity due to noted weakness     Gait  Casual gait deferred  LABORATORY DATA     Labs: I have personally reviewed pertinent reports  and I have personally reviewed pertinent films in PACS  Results from last 7 days   Lab Units 08/13/22  0555 08/12/22  1203 08/12/22  0940   WBC Thousand/uL 8 00 9 56 11 30*   HEMOGLOBIN g/dL 10 2* 10 4* 11 3*   HEMATOCRIT % 31 4* 33 0* 35 8   PLATELETS Thousands/uL 199 212 231   NEUTROS PCT % 73  --   --    MONOS PCT % 10  --   --       Results from last 7 days   Lab Units 08/13/22  0555 08/12/22  0940   SODIUM mmol/L 140 139   POTASSIUM mmol/L 3 7 4 1   CHLORIDE mmol/L 111* 109*   CO2 mmol/L 21 23   BUN mg/dL 16 18   CREATININE mg/dL 0 87 1 07   CALCIUM mg/dL 8 4 9 5     Results from last 7 days   Lab Units 08/13/22  0555   MAGNESIUM mg/dL 1 8     Results from last 7 days   Lab Units 08/13/22  0555   PHOSPHORUS mg/dL 2 9      Results from last 7 days   Lab Units 08/14/22  0746 08/14/22  0343 08/13/22  2254 08/12/22  2153 08/12/22  1702 08/12/22  0940   INR   --   --   --   --  1 57* 1 86*   PTT seconds 68* 70* 43*   < > 50*  57* 29    < > = values in this interval not displayed  IMAGING & DIAGNOSTIC TESTING     Radiology Results: I have personally reviewed pertinent reports     and I have personally reviewed pertinent films in PACS    CT head wo contrast   Final Result by Sofie Car MD (08/13 1301)      Interval development of subtle loss of gray-white matter differentiation in the right temporal lobe and insular cortex in keeping with right MCA territory infarction (series 2 images 11-14 )  There is no hemorrhagic conversion  Workstation performed: [id number]         CT cerebral perfusion   Final Result by Gabriela Baez MD (08/12 1108)      CT perfusion performed  Data available on PACS  Workstation performed: [id number]         CTA stroke alert (head/neck)   Final Result by Gabriela Baez MD (08/12 1032)      Near occlusive thrombus at the right MCA bifurcation  Mild beading of the mid to distal internal carotid arteries suspicious for mild fibromuscular dysplasia  Atherosclerotic plaque at the carotid bulbs without evidence for high-grade stenosis or focal occlusion of the cervical vasculature  Partially imaged pulmonary edema and layering bilateral pleural effusions  Findings were directly discussed with Lauryn Hernandez at 10:00 AM                         Workstation performed: [id number]         CT stroke alert brain   Final Result by Gabriela Baez MD (08/12 1021)      No definite CT evidence for large acute vascular distribution infarct or acute intracranial hemorrhage  Findings were directly discussed with Lauryn Hernandez at 10:00 AM          Workstation performed: [id number]         CTA head and neck w wo contrast    (Results Pending)       Other Diagnostic Testing: I have personally reviewed pertinent reports  and I have personally reviewed pertinent films in PACS    ACTIVE MEDICATIONS     Current Facility-Administered Medications   Medication Dose Route Frequency    heparin (porcine) 25,000 units in 0 45% NaCl 250 mL infusion (premix)  3-24 Units/kg/hr (Order-Specific) Intravenous Titrated    pravastatin (PRAVACHOL) tablet 40 mg  40 mg Oral Daily With Dinner       Prior to Admission medications    Medication Sig Start Date End Date Taking?  Authorizing Provider   Blood Pressure Monitoring (Blood Pressure Cuff) MISC Use every other day For HTN 4/28/21   Antonio Gomes PA-C   cyanocobalamin 1,000 mcg/mL INJECT 1 ML (1,000 MCG TOTAL) INTO A MUSCLE EVERY 30 (THIRTY) DAYS 1/7/22   Sunny Xavier MD   furosemide (LASIX) 20 mg tablet Take 1 tablet (20 mg total) by mouth daily 5/3/22   Marti Dickerson MD   losartan (COZAAR) 100 MG tablet Take 1 tablet (100 mg total) by mouth daily 3/23/21   Mega Mims MD   magnesium oxide (MAG-OX) 400 mg TAKE 1 TABLET (400 MG TOTAL) BY MOUTH DAILY 1/26/22   Roz Jolley DO   nebivolol (BYSTOLIC) 5 mg tablet Take 1 tablet (5 mg total) by mouth daily 12/14/21   Marti Dickerson MD   polyvinyl alcohol (LIQUIFILM TEARS) 1 4 % ophthalmic solution Administer 1 drop to the right eye as needed for dry eyes 12/13/21   Cuauhtemoc Donahue DO   potassium chloride (MICRO-K) 10 MEQ CR capsule Take 1 capsule (10 mEq total) by mouth daily 5/3/22   Marti Dickerson MD   rivaroxaban (Xarelto) 15 mg tablet Take 1 tablet (15 mg total) by mouth daily at bedtime 1/18/22   Gonzella Seip, PA-C   rosuvastatin (CRESTOR) 10 MG tablet TAKE 1 TABLET (10 MG TOTAL) BY MOUTH DAILY 6/2/22   Estelita Hodge MD   spironolactone (ALDACTONE) 25 mg tablet Take 25 mg by mouth daily 3/28/22   Historical Provider, MD   triamcinolone (KENALOG) 0 1 % cream Apply topically 2 (two) times a day Apply to left arm topically 2 times daily for 1 week 5/2/22   Sunny Xavier MD   verapamil (CALAN-SR) 240 mg CR tablet TAKE 1 TABLET (240 MG TOTAL) BY MOUTH DAILY AT BEDTIME 7/26/22   Sunny Xavier MD         VTE Pharmacologic Prophylaxis: Heparin Drip  VTE Mechanical Prophylaxis: sequential compression device    ==  Augusto Fink [address]  Neurology Residency, PGY-2

## 2022-08-15 ENCOUNTER — APPOINTMENT (INPATIENT)
Dept: RADIOLOGY | Facility: HOSPITAL | Age: 82
DRG: 065 | End: 2022-08-15
Payer: MEDICARE

## 2022-08-15 LAB
ANION GAP SERPL CALCULATED.3IONS-SCNC: 6 MMOL/L (ref 4–13)
APTT PPP: 64 SECONDS (ref 23–37)
BUN SERPL-MCNC: 15 MG/DL (ref 5–25)
CALCIUM SERPL-MCNC: 8.8 MG/DL (ref 8.3–10.1)
CHLORIDE SERPL-SCNC: 110 MMOL/L (ref 96–108)
CO2 SERPL-SCNC: 20 MMOL/L (ref 21–32)
CREAT SERPL-MCNC: 0.87 MG/DL (ref 0.6–1.3)
ERYTHROCYTE [DISTWIDTH] IN BLOOD BY AUTOMATED COUNT: 13.8 % (ref 11.6–15.1)
GFR SERPL CREATININE-BSD FRML MDRD: 62 ML/MIN/1.73SQ M
GLUCOSE SERPL-MCNC: 105 MG/DL (ref 65–140)
HCT VFR BLD AUTO: 38 % (ref 34.8–46.1)
HGB BLD-MCNC: 11.9 G/DL (ref 11.5–15.4)
MCH RBC QN AUTO: 30 PG (ref 26.8–34.3)
MCHC RBC AUTO-ENTMCNC: 31.3 G/DL (ref 31.4–37.4)
MCV RBC AUTO: 96 FL (ref 82–98)
PLATELET # BLD AUTO: 243 THOUSANDS/UL (ref 149–390)
PMV BLD AUTO: 10.5 FL (ref 8.9–12.7)
POTASSIUM SERPL-SCNC: 4.1 MMOL/L (ref 3.5–5.3)
RBC # BLD AUTO: 3.97 MILLION/UL (ref 3.81–5.12)
SODIUM SERPL-SCNC: 136 MMOL/L (ref 135–147)
WBC # BLD AUTO: 9.91 THOUSAND/UL (ref 4.31–10.16)

## 2022-08-15 PROCEDURE — 85027 COMPLETE CBC AUTOMATED: CPT

## 2022-08-15 PROCEDURE — 97530 THERAPEUTIC ACTIVITIES: CPT

## 2022-08-15 PROCEDURE — 70496 CT ANGIOGRAPHY HEAD: CPT

## 2022-08-15 PROCEDURE — 85730 THROMBOPLASTIN TIME PARTIAL: CPT | Performed by: PSYCHIATRY & NEUROLOGY

## 2022-08-15 PROCEDURE — 97535 SELF CARE MNGMENT TRAINING: CPT

## 2022-08-15 PROCEDURE — 70498 CT ANGIOGRAPHY NECK: CPT

## 2022-08-15 PROCEDURE — 80048 BASIC METABOLIC PNL TOTAL CA: CPT

## 2022-08-15 PROCEDURE — 99233 SBSQ HOSP IP/OBS HIGH 50: CPT | Performed by: PSYCHIATRY & NEUROLOGY

## 2022-08-15 PROCEDURE — G1004 CDSM NDSC: HCPCS

## 2022-08-15 PROCEDURE — 97112 NEUROMUSCULAR REEDUCATION: CPT

## 2022-08-15 RX ORDER — SPIRONOLACTONE 25 MG/1
25 TABLET ORAL DAILY
Status: DISCONTINUED | OUTPATIENT
Start: 2022-08-15 | End: 2022-08-19 | Stop reason: HOSPADM

## 2022-08-15 RX ORDER — LOSARTAN POTASSIUM 50 MG/1
100 TABLET ORAL DAILY
Status: DISCONTINUED | OUTPATIENT
Start: 2022-08-15 | End: 2022-08-19 | Stop reason: HOSPADM

## 2022-08-15 RX ORDER — VERAPAMIL HYDROCHLORIDE 240 MG/1
240 TABLET, FILM COATED, EXTENDED RELEASE ORAL
Status: DISCONTINUED | OUTPATIENT
Start: 2022-08-15 | End: 2022-08-19 | Stop reason: HOSPADM

## 2022-08-15 RX ORDER — DABIGATRAN ETEXILATE 150 MG/1
150 CAPSULE ORAL EVERY 12 HOURS SCHEDULED
Status: DISCONTINUED | OUTPATIENT
Start: 2022-08-15 | End: 2022-08-19 | Stop reason: HOSPADM

## 2022-08-15 RX ORDER — FUROSEMIDE 20 MG/1
20 TABLET ORAL DAILY
Status: DISCONTINUED | OUTPATIENT
Start: 2022-08-15 | End: 2022-08-19 | Stop reason: HOSPADM

## 2022-08-15 RX ORDER — NEBIVOLOL 5 MG/1
5 TABLET ORAL DAILY
Status: DISCONTINUED | OUTPATIENT
Start: 2022-08-15 | End: 2022-08-19 | Stop reason: HOSPADM

## 2022-08-15 RX ADMIN — ACETAMINOPHEN 650 MG: 325 TABLET ORAL at 19:34

## 2022-08-15 RX ADMIN — LOSARTAN POTASSIUM 100 MG: 50 TABLET, FILM COATED ORAL at 11:09

## 2022-08-15 RX ADMIN — MAGNESIUM OXIDE TAB 400 MG (241.3 MG ELEMENTAL MG) 400 MG: 400 (241.3 MG) TAB at 11:09

## 2022-08-15 RX ADMIN — NEBIVOLOL 5 MG: 5 TABLET ORAL at 13:33

## 2022-08-15 RX ADMIN — FUROSEMIDE 20 MG: 20 TABLET ORAL at 11:09

## 2022-08-15 RX ADMIN — VERAPAMIL HYDROCHLORIDE 240 MG: 240 TABLET ORAL at 21:19

## 2022-08-15 RX ADMIN — SPIRONOLACTONE 25 MG: 25 TABLET ORAL at 11:09

## 2022-08-15 RX ADMIN — PRAVASTATIN SODIUM 40 MG: 40 TABLET ORAL at 17:37

## 2022-08-15 RX ADMIN — DABIGATRAN ETEXILATE MESYLATE 150 MG: 150 CAPSULE ORAL at 21:19

## 2022-08-15 RX ADMIN — IOHEXOL 66 ML: 350 INJECTION, SOLUTION INTRAVENOUS at 10:19

## 2022-08-15 NOTE — PROGRESS NOTES
[facility] Neurology Progress note    Name: Sapphire Bautista   Age & Sex: 80 y o  female   MRN: [id number]  Unit/Bed#: [facility] 224-67   Encounter: [id number]    Sapphire Bautista will need follow up in in 6 weeks with neurovascular Attending/AP/Resident  Pending for discharge: re-started home meds, PT/OTevals- if stable can likely DC tomorrow  Assessment plan:    * Arterial ischemic stroke, MCA (middle cerebral artery), right, acute ([facility])  Assessment & Plan  80 y o  F presented to [facility] on 8/12/2022 as a stroke alert  Presenting deficits: contracted and weak LUE, flaccid LLE, left facial droop, mild dysarthria, and mild sensory deficits on the left side  Upon arrival to the ED, /80 on arrival   NIHSS 11  CTA h/n revealed near occlusive thormbus at R MCA bifurcation  Not a TNK candidate due to Xarelto use the night prior; not a thrombectomy candidate per Neurosurgery  Was started on stroke protocol heparin gtt and admitted on the stroke pathway  Pertinent PMHx: Afib on Xarelto, sick sinus syndrome s/p pacemaker (not MRI conditional), HTN, and HLD      [place]:    · Lipid Panel: Cholesterol 100, LDL 38   · Hemoglobin A1c 5 4  · TTE: Left Atrium: moderately dilated  · CTH: focal area of gliosis within the high right postcentral gyrus and scattered chronic microvascular ischemic changes with more focal lucency along the anterior limb of the right internal capsule, but no acute hemorrhage  · CTA h/n: near occlusive thrombus at the right MCA bifurcation, mild beading of the mid to distal ICAs suspicious for mild fibromuscular dysplasia, and mild atherosclerotic disease at bilateral distal carotid arteries      · CTP: 8 mL mismatch  · Repeat [facility] 8/12 revealed subtle loss of gray-white matter differentiation in the right temporal lobe and insular cortex in keeping with right MCA territory infarction    - Unable to get MRI as pacemaker is not compatible   Exam: brisk reflexes in LUE, spasticity in LUE, LLE, hemiplegia in M[place]ANG  Impression: Etiology remains unclear, but most likely due to Xarelto failure  No known cancer or hypercoagulable history  Plan:  - Stroke pathway  - DC stroke protocol heparin gtt (PTT goal 50-70); switch from Xarelto--> Pradaxa 150 mg BID  - Previously on Crestor 10 mg, recommend to decrease to Crestor 5 mg (LDL 38)   - changed to alternative pravastatin 40mg equivalent to crestor 5mg  - F/u repeat CTA head/neck on Monday 8/15   - BP goals: normotension; cautiously restarted home meds  - Euglycemic, normothermic goal  - Continue telemetry  - PT/OT/ST  - Stroke education  - Frequent neuro checks  Continue to monitor and notify neurology with any changes   - STAT CT head for any acute change in neuro exam    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 [facility])  Assessment & Plan  - CTA head/neck - near occlusive thrombus at the R MCA bifurcation, mild beading of the mid to distal ICAs suspicious for mild fibromuscular dysplasia, and mild atherosclerotic disease at bilateral distal carotid arteries  -  Switch AC to Pradaxa on 8/15    Chronic atrial fibrillation (HCC)  Assessment & Plan  · On Xarelto 15 mg daily QHS  No missed doses  Last dose on 8/11 before going to bed  · Was on stroke protocol heparin gtt (PTT goal 50-70)  · Start Pradaxa 8/15    Essential hypertension  Assessment & Plan  · Normotension; will cautiously restart home meds (Verapamil 240 mg QHS, Spirono 25 mg QD, Cozaar 100 mg QD, Lasix 20 mg QD)   · Close hemodynamic monitoring  · Low threshold to hold off Verapamil at night time if BP during the day drops significantly     Hyperlipidemia LDL goal <100  Assessment & Plan  · Upon DC, recommend Crestor 5 mg daily (previously on 10 mg at home)   · on pravastatin 40 mg equivalent to Crestor 5 mg      Subjective:     Patient seen and examined at bedside   Patient currently denies CP, SOB, palpitations, abdominal pain, nausea vomiting, fever, chills, headache, dizziness, new onset numbness/ tingling, new onset weakness, change in bowel/ bladder, difficulty speaking, difficulty swallowing, new vision changes  Discussed current clinical status and plan with patient- verbalizes understanding  She would also like me to inform her son  I told her I would do so  Review of Systems  ROS -see above  Objective:     Patient ID: Nilam Madrid is a 80 y o  female  Vitals:    22 2150 08/15/22 0724 08/15/22 1100 08/15/22 1317   BP: 142/76 (!) 180/80 167/79 (!) 174/82   BP Location:       Pulse: 83 82 87 86   Resp:       Temp: 98 5 °F (36 9 °C) 98 °F (36 7 °C)     TempSrc: Oral      SpO2:       Weight:       Height:          Temperature:   Temp (24hrs), Av 3 °F (36 8 °C), Min:98 °F (36 7 °C), Max:98 5 °F (36 9 °C)    Temperature: 98 °F (36 7 °C)    Physical exam:  Vitals reviewed  General Examination: No distress, cooperative  CVS: S1, S2 noted  Regular rate, rhythm  Neurological exam:    Mental Status  A, Ox3  Follows simple, 3 step commands  Speech: fluent, normal rhythm, no dysarthria  Cranial Nerves  CN II: Visual fields full to confrontation  CN III, IV, VI: EOMI bilaterally  Normal lids and orbits bilaterally  Pupils equal round and reactive to light bilaterally  No nystagmus  CN V: Facial sensation is normal   CN VII:  Right: There is no facial weakness or asymmetry  Left: There is no facial weakness or asymmetry  CN VIII: Audition intact to finger rub bilaterally  CN IX, X: Uvula midline  Palate elevates symmetrically  CN XI:  Right: Sternocleidomastoid strength is normal  Trapezius strength is normal   Left: Sternocleidomastoid strength is normal  Trapezius strength is normal   CN XII: Tongue midline without atrophy or fasciculations with appropriate movement  Motor  Spasticity, hemiplegia in left upper, lower extremities  5/5 strength in right upper, lower extremities throughout      Sensory  RUE, RLE: light touch, pinprick sensation preserved  LUE, LLE: decreased light touch, pinprick sensations  Reflexes Right Left   Brachioradialis     +2  +3   Biceps                                   +2  +3   Triceps  +2  +3   Patellar   +3  +3     Ankle clonus absent b/l  Plantars mute bilaterally  Coordination  Finger-to-nose-normal on the right  Gait  Deferred    Labs: I have personally reviewed pertinent reports  Results from last 7 days   Lab Units 08/15/22  0500 08/13/22  0555 08/12/22  1203   WBC Thousand/uL 9 91 8 00 9 56   HEMOGLOBIN g/dL 11 9 10 2* 10 4*   HEMATOCRIT % 38 0 31 4* 33 0*   PLATELETS Thousands/uL 243 199 212   NEUTROS PCT %  --  73  --    MONOS PCT %  --  10  --       Results from last 7 days   Lab Units 08/15/22  0500 08/13/22  0555 08/12/22  0940   SODIUM mmol/L 136 140 139   POTASSIUM mmol/L 4 1 3 7 4 1   CHLORIDE mmol/L 110* 111* 109*   CO2 mmol/L 20* 21 23   BUN mg/dL 15 16 18   CREATININE mg/dL 0 87 0 87 1 07   CALCIUM mg/dL 8 8 8 4 9 5     Results from last 7 days   Lab Units 08/13/22  0555   MAGNESIUM mg/dL 1 8     Results from last 7 days   Lab Units 08/13/22  0555   PHOSPHORUS mg/dL 2 9      Results from last 7 days   Lab Units 08/15/22  0905 08/14/22  2032 08/14/22  0746 08/12/22  2153 08/12/22  1702 08/12/22  0940   INR   --   --   --   --  1 57* 1 86*   PTT seconds 64* 59* 68*   < > 50*  57* 29    < > = values in this interval not displayed  Imaging:     Radiology Results: I have personally reviewed pertinent films in PACS    CTA head and neck w wo contrast   Final Result by Malissa Doherty MD (08/15 1253)      Evolving right MCA distribution infarct  No evidence for secondary parenchymal hematoma  Interval recanalization of previously seen thrombus at the right MCA bifurcation  Middle cerebral arteries are patent on the current exam without evidence for major branch vessel occlusion        Stable beading of the mid to distal internal carotid arteries again suggestive of mild fibromuscular dysplasia  Workstation performed: [id number]         CT head wo contrast   Final Result by Chriss Moreno MD (08/13 1301)      Interval development of subtle loss of gray-white matter differentiation in the right temporal lobe and insular cortex in keeping with right MCA territory infarction (series 2 images 11-14 )  There is no hemorrhagic conversion  Workstation performed: [id number]         CT cerebral perfusion   Final Result by Malissa Doherty MD (08/12 1108)      CT perfusion performed  Data available on PACS  Workstation performed: [id number]         CTA stroke alert (head/neck)   Final Result by Malissa Doherty MD (08/12 1032)      Near occlusive thrombus at the right MCA bifurcation  Mild beading of the mid to distal internal carotid arteries suspicious for mild fibromuscular dysplasia  Atherosclerotic plaque at the carotid bulbs without evidence for high-grade stenosis or focal occlusion of the cervical vasculature  Partially imaged pulmonary edema and layering bilateral pleural effusions  Findings were directly discussed with Jennifer Ashford at 10:00 AM                         Workstation performed: [id number]         CT stroke alert brain   Final Result by Mlaissa Doherty MD (08/12 1021)      No definite CT evidence for large acute vascular distribution infarct or acute intracranial hemorrhage        Findings were directly discussed with Jennifer Ashford at 10:00 AM          Workstation performed: [id number]             Other Diagnostic Testing:     Active Medications:     Current Facility-Administered Medications   Medication Dose Route Frequency    acetaminophen (TYLENOL) tablet 650 mg  650 mg Oral Q6H PRN    furosemide (LASIX) tablet 20 mg  20 mg Oral Daily    heparin (porcine) 25,000 units in 0 45% NaCl 250 mL infusion (premix)  3-24 Units/kg/hr (Order-Specific) Intravenous Titrated    losartan (COZAAR) tablet 100 mg  100 mg Oral Daily    magnesium oxide (MAG-OX) tablet 400 mg  400 mg Oral Daily    nebivolol (BYSTOLIC) tablet 5 mg  5 mg Oral Daily    pravastatin (PRAVACHOL) tablet 40 mg  40 mg Oral Daily With Dinner    spironolactone (ALDACTONE) tablet 25 mg  25 mg Oral Daily    verapamil (CALAN-SR) CR tablet 240 mg  240 mg Oral HS       Prior to Admission medications    Medication Sig Start Date End Date Taking?  Authorizing Provider   apixaban (Eliquis) 5 mg Take 1 tablet (5 mg total) by mouth 2 (two) times a day 8/14/22 9/13/22 Yes Twyla Vasquez 89, DO   dabigatran etexilate (PRADAXA) 150 mg capsu Take 1 capsule (150 mg total) by mouth 2 (two) times a day PRICE CHECK DO NOT FILL 8/14/22 9/13/22 Yes Twyla Steve, DO   Blood Pressure Monitoring (Blood Pressure Cuff) MISC Use every other day For HTN 4/28/21   Antonio Gomes PA-C   cyanocobalamin 1,000 mcg/mL INJECT 1 ML (1,000 MCG TOTAL) INTO A MUSCLE EVERY 30 (THIRTY) DAYS 1/7/22   Dru Hough MD   furosemide (LASIX) 20 mg tablet Take 1 tablet (20 mg total) by mouth daily 5/3/22   Brittany Sarkar MD   losartan (COZAAR) 100 MG tablet Take 1 tablet (100 mg total) by mouth daily 3/23/21   Christina Arechiga MD   magnesium oxide (MAG-OX) 400 mg TAKE 1 TABLET (400 MG TOTAL) BY MOUTH DAILY 1/26/22   Cristina Jolley,    nebivolol (BYSTOLIC) 5 mg tablet Take 1 tablet (5 mg total) by mouth daily 12/14/21   Brittany Sarkar MD   polyvinyl alcohol (LIQUIFILM TEARS) 1 4 % ophthalmic solution Administer 1 drop to the right eye as needed for dry eyes 12/13/21   Luan Gonzales, DO   potassium chloride (MICRO-K) 10 MEQ CR capsule Take 1 capsule (10 mEq total) by mouth daily 5/3/22   Brittany Sarkar MD   rivaroxaban (Xarelto) 15 mg tablet Take 1 tablet (15 mg total) by mouth daily at bedtime 1/18/22   Carri [place], PA-C   rosuvastatin (CRESTOR) 10 MG tablet TAKE 1 TABLET (10 MG TOTAL) BY MOUTH DAILY 6/2/22   Sarah Whitfield MD   spironolactone (ALDACTONE) 25 mg tablet Take 25 mg by mouth daily 3/28/22   Historical Provider, MD   triamcinolone (KENALOG) 0 1 % cream Apply topically 2 (two) times a day Apply to left arm topically 2 times daily for 1 week 5/2/22   Sunny Xavier MD   verapamil (CALAN-SR) 240 mg CR tablet TAKE 1 TABLET (240 MG TOTAL) BY MOUTH DAILY AT BEDTIME 7/26/22   Sunny Xavier MD         VTE Pharmacologic Prophylaxis: Dabigatran (Pradaxa)  VTE Mechanical Prophylaxis: sequential compression device    ==  MD Malini [facility] [place], PGY-II

## 2022-08-15 NOTE — PLAN OF CARE
Problem: PHYSICAL THERAPY ADULT  Goal: Performs mobility at highest level of function for planned discharge setting  See evaluation for individualized goals  Description: Treatment/Interventions: Functional transfer training, LE strengthening/ROM, Therapeutic exercise, Endurance training, Patient/family training, Bed mobility, Equipment eval/education, Gait training, Compensatory technique education, Spoke to nursing, OT (balance training)          See flowsheet documentation for full assessment, interventions and recommendations  Outcome: Progressing  Note: Prognosis: Fair  Problem List: Decreased strength, Impaired balance, Decreased endurance, Decreased range of motion, Decreased mobility, Impaired sensation, Impaired tone  Assessment: Pt agreeable to participate in PT session  Pt performed functional mobility and therex as outlined above  Demonstrates increased LUE and LLE tone  0/5 MMT strength in LLE however able to hold some weight onto LLE with static standing  Focus on standing tolerance and WS onto LLE to promote weightbearing  Able to lateral WS onto R elbow and return to midline with S while sitting EOB  Pt left supine in bed with bed alarm, call bell, phone, and all personal needs within reach  Pt will continue to benefit from skilled acute care PT to further address their functional mobility limitations  D/C recommendations remain rehab  Barriers to Discharge: Inaccessible home environment, Decreased caregiver support     PT Discharge Recommendation: Post acute rehabilitation services    See flowsheet documentation for full assessment

## 2022-08-15 NOTE — PLAN OF CARE
Problem: Prexisting or High Potential for Compromised Skin Integrity  Goal: Skin integrity is maintained or improved  Description: INTERVENTIONS:  - Identify patients at risk for skin breakdown  - Assess and monitor skin integrity  - Assess and monitor nutrition and hydration status  - Monitor labs   - Assess for incontinence   - Turn and reposition patient  - Assist with mobility/ambulation  - Relieve pressure over bony prominences  - Avoid friction and shearing  - Provide appropriate hygiene as needed including keeping skin clean and dry  - Evaluate need for skin moisturizer/barrier cream  - Collaborate with interdisciplinary team   - Patient/family teaching  - Consider wound care consult   Outcome: Progressing     Problem: MOBILITY - ADULT  Goal: Maintain or return to baseline ADL function  Description: INTERVENTIONS:  -  Assess patient's ability to carry out ADLs; assess patient's baseline for ADL function and identify physical deficits which impact ability to perform ADLs (bathing, care of mouth/teeth, toileting, grooming, dressing, etc )  - Assess/evaluate cause of self-care deficits   - Assess range of motion  - Assess patient's mobility; develop plan if impaired  - Assess patient's need for assistive devices and provide as appropriate  - Encourage maximum independence but intervene and supervise when necessary  - Involve family in performance of ADLs  - Assess for home care needs following discharge   - Consider OT consult to assist with ADL evaluation and planning for discharge  - Provide patient education as appropriate  Outcome: Progressing  Goal: Maintains/Returns to pre admission functional level  Description: INTERVENTIONS:  - Perform BMAT or MOVE assessment daily    - Set and communicate daily mobility goal to care team and patient/family/caregiver  - Collaborate with rehabilitation services on mobility goals if consulted  - Perform Range of Motion  times a day    - Reposition patient every hours   - Dangle patient  times a day  - Stand patient  times a day  - Ambulate patient  times a day  - Out of bed to chair  times a day   - Out of bed for meals  times a day  - Out of bed for toileting  - Record patient progress and toleration of activity level   Outcome: Progressing     Problem: PAIN - ADULT  Goal: Verbalizes/displays adequate comfort level or baseline comfort level  Description: Interventions:  - Encourage patient to monitor pain and request assistance  - Assess pain using appropriate pain scale  - Administer analgesics based on type and severity of pain and evaluate response  - Implement non-pharmacological measures as appropriate and evaluate response  - Consider cultural and social influences on pain and pain management  - Notify physician/advanced practitioner if interventions unsuccessful or patient reports new pain  Outcome: Progressing     Problem: INFECTION - ADULT  Goal: Absence or prevention of progression during hospitalization  Description: INTERVENTIONS:  - Assess and monitor for signs and symptoms of infection  - Monitor lab/diagnostic results  - Monitor all insertion sites, i e  indwelling lines, tubes, and drains  - Monitor endotracheal if appropriate and nasal secretions for changes in amount and color  - [place] appropriate cooling/warming therapies per order  - Administer medications as ordered  - Instruct and encourage patient and family to use good hand hygiene technique  - Identify and instruct in appropriate isolation precautions for identified infection/condition  Outcome: Progressing  Goal: Absence of fever/infection during neutropenic period  Description: INTERVENTIONS:  - Monitor WBC    Outcome: Progressing     Problem: SAFETY ADULT  Goal: Maintain or return to baseline ADL function  Description: INTERVENTIONS:  -  Assess patient's ability to carry out ADLs; assess patient's baseline for ADL function and identify physical deficits which impact ability to perform ADLs (bathing, care of mouth/teeth, toileting, grooming, dressing, etc )  - Assess/evaluate cause of self-care deficits   - Assess range of motion  - Assess patient's mobility; develop plan if impaired  - Assess patient's need for assistive devices and provide as appropriate  - Encourage maximum independence but intervene and supervise when necessary  - Involve family in performance of ADLs  - Assess for home care needs following discharge   - Consider OT consult to assist with ADL evaluation and planning for discharge  - Provide patient education as appropriate  Outcome: Progressing  Goal: Maintains/Returns to pre admission functional level  Description: INTERVENTIONS:  - Perform BMAT or MOVE assessment daily    - Set and communicate daily mobility goal to care team and patient/family/caregiver  - Collaborate with rehabilitation services on mobility goals if consulted  - Perform Range of Motion  times a day  - Reposition patient every  hours    - Dangle patient  times a day  - Stand patient  times a day  - Ambulate patient times a day  - Out of bed to chair  times a day   - Out of bed for meals  times a day  - Out of bed for toileting  - Record patient progress and toleration of activity level   Outcome: Progressing  Goal: Patient will remain free of falls  Description: INTERVENTIONS:  - Educate patient/family on patient safety including physical limitations  - Instruct patient to call for assistance with activity   - Consult OT/PT to assist with strengthening/mobility   - Keep Call bell within reach  - Keep bed low and locked with side rails adjusted as appropriate  - Keep care items and personal belongings within reach  - Initiate and maintain comfort rounds  - Make Fall Risk Sign visible to staff  - Offer Toileting every  Hours, in advance of need  - Initiate/Maintain alarm  - Obtain necessary fall risk management equipment:   - Apply yellow socks and bracelet for high fall risk patients  - Consider moving patient to room near nurses station  Outcome: Progressing     Problem: DISCHARGE PLANNING  Goal: Discharge to home or other facility with appropriate resources  Description: INTERVENTIONS:  - Identify barriers to discharge w/patient and caregiver  - Arrange for needed discharge resources and transportation as appropriate  - Identify discharge learning needs (meds, wound care, etc )  - Arrange for interpretive services to assist at discharge as needed  - Refer to Case Management Department for coordinating discharge planning if the patient needs post-hospital services based on physician/advanced practitioner order or complex needs related to functional status, cognitive ability, or social support system  Outcome: Progressing     Problem: Knowledge Deficit  Goal: Patient/family/caregiver demonstrates understanding of disease process, treatment plan, medications, and discharge instructions  Description: Complete learning assessment and assess knowledge base    Interventions:  - Provide teaching at level of understanding  - Provide teaching via preferred learning methods  Outcome: Progressing     Problem: Potential for Falls  Goal: Patient will remain free of falls  Description: INTERVENTIONS:  - Educate patient/family on patient safety including physical limitations  - Instruct patient to call for assistance with activity   - Consult OT/PT to assist with strengthening/mobility   - Keep Call bell within reach  - Keep bed low and locked with side rails adjusted as appropriate  - Keep care items and personal belongings within reach  - Initiate and maintain comfort rounds  - Make Fall Risk Sign visible to staff  - Offer Toileting every  Hours, in advance of need  - Initiate/Maintain alarm  - Obtain necessary fall risk management equipment:  - Apply yellow socks and bracelet for high fall risk patients  - Consider moving patient to room near nurses station  Outcome: Progressing

## 2022-08-15 NOTE — PLAN OF CARE
Problem: Prexisting or High Potential for Compromised Skin Integrity  Goal: Skin integrity is maintained or improved  Description: INTERVENTIONS:  - Identify patients at risk for skin breakdown  - Assess and monitor skin integrity  - Assess and monitor nutrition and hydration status  - Monitor labs   - Assess for incontinence   - Turn and reposition patient  - Assist with mobility/ambulation  - Relieve pressure over bony prominences  - Avoid friction and shearing  - Provide appropriate hygiene as needed including keeping skin clean and dry  - Evaluate need for skin moisturizer/barrier cream  - Collaborate with interdisciplinary team   - Patient/family teaching  - Consider wound care consult   Outcome: Progressing     Problem: MOBILITY - ADULT  Goal: Maintain or return to baseline ADL function  Description: INTERVENTIONS:  -  Assess patient's ability to carry out ADLs; assess patient's baseline for ADL function and identify physical deficits which impact ability to perform ADLs (bathing, care of mouth/teeth, toileting, grooming, dressing, etc )  - Assess/evaluate cause of self-care deficits   - Assess range of motion  - Assess patient's mobility; develop plan if impaired  - Assess patient's need for assistive devices and provide as appropriate  - Encourage maximum independence but intervene and supervise when necessary  - Involve family in performance of ADLs  - Assess for home care needs following discharge   - Consider OT consult to assist with ADL evaluation and planning for discharge  - Provide patient education as appropriate  Outcome: Progressing  Goal: Maintains/Returns to pre admission functional level  Description: INTERVENTIONS:  - Perform BMAT or MOVE assessment daily    - Set and communicate daily mobility goal to care team and patient/family/caregiver  - Collaborate with rehabilitation services on mobility goals if consulted  - Perform Range of Motion 3 times a day    - Reposition patient every 2 hours     - Out of bed for toileting  - Record patient progress and toleration of activity level   Outcome: Progressing     Problem: PAIN - ADULT  Goal: Verbalizes/displays adequate comfort level or baseline comfort level  Description: Interventions:  - Encourage patient to monitor pain and request assistance  - Assess pain using appropriate pain scale  - Administer analgesics based on type and severity of pain and evaluate response  - Implement non-pharmacological measures as appropriate and evaluate response  - Consider cultural and social influences on pain and pain management  - Notify physician/advanced practitioner if interventions unsuccessful or patient reports new pain  Outcome: Progressing     Problem: INFECTION - ADULT  Goal: Absence or prevention of progression during hospitalization  Description: INTERVENTIONS:  - Assess and monitor for signs and symptoms of infection  - Monitor lab/diagnostic results  - Monitor all insertion sites, i e  indwelling lines, tubes, and drains  - Monitor endotracheal if appropriate and nasal secretions for changes in amount and color  - [place] appropriate cooling/warming therapies per order  - Administer medications as ordered  - Instruct and encourage patient and family to use good hand hygiene technique  - Identify and instruct in appropriate isolation precautions for identified infection/condition  Outcome: Progressing  Goal: Absence of fever/infection during neutropenic period  Description: INTERVENTIONS:  - Monitor WBC    Outcome: Progressing     Problem: SAFETY ADULT  Goal: Maintain or return to baseline ADL function  Description: INTERVENTIONS:  -  Assess patient's ability to carry out ADLs; assess patient's baseline for ADL function and identify physical deficits which impact ability to perform ADLs (bathing, care of mouth/teeth, toileting, grooming, dressing, etc )  - Assess/evaluate cause of self-care deficits   - Assess range of motion  - Assess patient's mobility; develop plan if impaired  - Assess patient's need for assistive devices and provide as appropriate  - Encourage maximum independence but intervene and supervise when necessary  - Involve family in performance of ADLs  - Assess for home care needs following discharge   - Consider OT consult to assist with ADL evaluation and planning for discharge  - Provide patient education as appropriate  Outcome: Progressing  Goal: Maintains/Returns to pre admission functional level  Description: INTERVENTIONS:  - Perform BMAT or MOVE assessment daily    - Set and communicate daily mobility goal to care team and patient/family/caregiver  - Collaborate with rehabilitation services on mobility goals if consulted  - Perform Range of Motion 3 times a day  - Reposition patient every 2 hours      - Out of bed for toileting  - Record patient progress and toleration of activity level   Outcome: Progressing  Goal: Patient will remain free of falls  Description: INTERVENTIONS:  - Educate patient/family on patient safety including physical limitations  - Instruct patient to call for assistance with activity   - Consult OT/PT to assist with strengthening/mobility   - Keep Call bell within reach  - Keep bed low and locked with side rails adjusted as appropriate  - Keep care items and personal belongings within reach  - Initiate and maintain comfort rounds  - Make Fall Risk Sign visible to staff  - Offer Toileting every 2 Hours, in advance of need  - Initiate/Maintain bed alarm    - Apply yellow socks and bracelet for high fall risk patients  - Consider moving patient to room near nurses station  Outcome: Progressing     Problem: DISCHARGE PLANNING  Goal: Discharge to home or other facility with appropriate resources  Description: INTERVENTIONS:  - Identify barriers to discharge w/patient and caregiver  - Arrange for needed discharge resources and transportation as appropriate  - Identify discharge learning needs (meds, wound care, etc )  - Arrange for interpretive services to assist at discharge as needed  - Refer to Case Management Department for coordinating discharge planning if the patient needs post-hospital services based on physician/advanced practitioner order or complex needs related to functional status, cognitive ability, or social support system  Outcome: Progressing     Problem: Knowledge Deficit  Goal: Patient/family/caregiver demonstrates understanding of disease process, treatment plan, medications, and discharge instructions  Description: Complete learning assessment and assess knowledge base    Interventions:  - Provide teaching at level of understanding  - Provide teaching via preferred learning methods  Outcome: Progressing     Problem: Potential for Falls  Goal: Patient will remain free of falls  Description: INTERVENTIONS:  - Educate patient/family on patient safety including physical limitations  - Instruct patient to call for assistance with activity   - Consult OT/PT to assist with strengthening/mobility   - Keep Call bell within reach  - Keep bed low and locked with side rails adjusted as appropriate  - Keep care items and personal belongings within reach  - Initiate and maintain comfort rounds  - Make Fall Risk Sign visible to staff  - Offer Toileting every 2 Hours, in advance of need  - Initiate/Maintain bed alarm    - Apply yellow socks and bracelet for high fall risk patients  - Consider moving patient to room near nurses station  Outcome: Progressing

## 2022-08-15 NOTE — PLAN OF CARE
Problem: OCCUPATIONAL THERAPY ADULT  Goal: Performs self-care activities at highest level of function for planned discharge setting  See evaluation for individualized goals  Description: Treatment Interventions: ADL retraining, Functional transfer training, UE strengthening/ROM, Patient/family training, Equipment evaluation/education, Compensatory technique education, Energy conservation, Activityengagement, Endurance training, Cognitive reorientation, Fine motor coordination activities          See flowsheet documentation for full assessment, interventions and recommendations  Outcome: Progressing  Note: Limitation: Decreased ADL status, Decreased UE ROM, Decreased UE strength, Decreased Safe judgement during ADL, Decreased self-care trans, Decreased high-level ADLs, Non-func L UE, Decreased cognition, Decreased endurance, Decreased fine motor control  Prognosis: Fair  Assessment: Pt reported stated she was  "Left handed"   She was oriented to place and situation and was able to grasp a comb in her Left hand with increased time allowed  Pt seen for Occupational Therapy session with focus on activity tolerance, bed mob, functional transfers, sitting balance and tolerance for pt engagement in UB/LB self-care tasks  Pt cleared by SHERRI/Leann for pt participated in OT session  Pt presented supine/HOB raised pt awake/alert and agreeable to participate in therapy following pt identifiers confirmed  Pt required assist for bed mob 2* deconditioning  She was able to tolerate sitting edge of pt bed for grooming task and L UE AAROM/PROM  She had no complaints of pain this session  Pt will require post acute rehab service to continue to address these above noted pt deficit which currently impair pt ADL and functional mob  Pt return to bed post session bed alarm activated and all needs within reach       OT Discharge Recommendation: Post acute rehabilitation services

## 2022-08-15 NOTE — OCCUPATIONAL THERAPY NOTE
Occupational Therapy Treatment Note     08/15/22 1518   OT Last Visit   OT Visit Date 08/15/22   Note Type   Note Type Treatment   Restrictions/Precautions   Weight Bearing Precautions Per Order No   Lifestyle   Autonomy I with ADLs and functional mobility without AD  Reciprocal Relationships Supportive son   Service to Others Retired   [address] Enjoys reading and watching tv   Pain Assessment   Pain Assessment Tool 0-10   ADL   Where Assessed Edge of bed   Grooming Assistance 3  Moderate Assistance   Grooming Deficit Brushing hair   Toileting Assistance  2  Maximal Assistance   Toileting Deficit Perineal hygiene   Toileting Comments pt incont of urine   Bed Mobility   Supine to Sit 3  Moderate assistance   Additional items Assist x 2   Sit to Supine 3  Moderate assistance   Additional items Assist x 2   Transfers   Sit to Stand 3  Moderate assistance   Additional items Assist x 2   Stand to Sit 3  Moderate assistance   Additional items Assist x 2   Cognition   Overall Cognitive Status [facility]   Arousal/Participation Alert; Cooperative   Attention Attends with cues to redirect   Orientation Level Oriented X4   Memory Within functional limits   Following Commands Follows one step commands without difficulty   Assessment   Assessment Pt reported stated she was  "Left handed"   She was oriented to place and situation and was able to grasp a comb in her Left hand with increased time allowed  Pt seen for Occupational Therapy session with focus on activity tolerance, bed mob, functional transfers, sitting balance and tolerance for pt engagement in UB/LB self-care tasks  Pt cleared by SHERRI/Leann for pt participated in OT session  Pt presented supine/HOB raised pt awake/alert and agreeable to participate in therapy following pt identifiers confirmed  Pt required assist for bed mob 2* deconditioning  She was able to tolerate sitting edge of pt bed for grooming task and L UE AAROM/PROM   She had no complaints of pain this session  Pt will require post acute rehab service to continue to address these above noted pt deficit which currently impair pt ADL and functional mob  Pt return to bed post session bed alarm activated and all needs within reach     Plan   Treatment Interventions ADL retraining   Goal Expiration Date 08/27/22   OT Treatment Day 2   Recommendation   OT Discharge Recommendation Post acute rehabilitation services   AM-PAC Daily Activity Inpatient   Lower Body Dressing 1   Bathing 2   Toileting 2   Upper Body Dressing 2   Grooming 2   Eating 2   Daily Activity Raw Score 11   Daily Activity Standardized Score (Calc for Raw Score >=11) 29 04   Turning Head Towards Sound 4   Follow Simple Instructions 3   Low Function Daily Activity Raw Score 18   Low Function Daily Activity Standardized Score 30 17   AM-PAC Applied Cognition Inpatient   Following a Speech/Presentation 3   Understanding Ordinary Conversation 4   Taking Medications 3   Remembering Where Things Are Placed or Put Away 3   Remembering List of 4-5 Errands 3   Taking Care of Complicated Tasks 3   Applied Cognition Raw Score 19   Applied Cognition Standardized Score 39 77       Brady YOU/BRYNN

## 2022-08-15 NOTE — PROGRESS NOTES
Pastoral Care Progress Note    8/15/2022  Patient: Jm Morales : 1940  Admission Date & Time: 2022 0935  MRN: [id number] CSN: [id number]                      08/15/22 1300   Clinical Encounter Type   Visited With Patient   Routine Visit Follow-up   Scientology Encounters   Scientology Needs Prayer   Sacramental Encounters   Communion Given Indicator Yes   Sacrament Other Other (Comment)  ([place] by Kianna Jump)

## 2022-08-15 NOTE — UTILIZATION REVIEW
Inpatient Admission Authorization Request   NOTIFICATION OF INPATIENT ADMISSION/INPATIENT AUTHORIZATION REQUEST   SERVICING FACILITY:   [facility]  Address: [address] [place] [address], [address] [facility] [address]  Tax ID: [id number]  NPI: [id number]  Place of Service: Inpatient [address] [facility]  [address]  Place of Service Code: 24     ATTENDING PROVIDER:  Attending Name and NPI#: Rd Quintana [[id number]]  Address: [address] [place] [address], [address] [place] [address]  Phone: [phone]     UTILIZATION REVIEW CONTACT:  Luis Fernando Mcdonough, Utilization   Network Utilization Review Department  Phone: [phone]  Fax: [phone]  Email: [email or web address]     PHYSICIAN ADVISORY SERVICES:  FOR PVSA-OQ-IRRX REVIEW - MEDICAL NECESSITY DENIAL  Phone: [phone]  Fax: [phone]  Email: [email or web address]     TYPE OF REQUEST:  Inpatient Status     ADMISSION INFORMATION:  ADMISSION DATE/TIME: 8/12/22 11:36 AM  PATIENT DIAGNOSIS CODE/DESCRIPTION:  Cerebral thrombosis with cerebral infarction ([facility] [address] ) [I63 30]  Essential hypertension [I10]  [facility] ([facility]H) Stroke Scale level of consciousness score 0, alert; keenly responsive [Z78 9]  Acute CVA (cerebrovascular accident) ([address] ) [I63 9]  DISCHARGE DATE/TIME: No discharge date for patient encounter  IMPORTANT INFORMATION:  Please contact Luis Fernando Mcdonough directly with any questions or concerns regarding this request  Department voicemails are confidential     Send requests for admission clinical reviews, concurrent reviews, approvals, and administrative denials due to lack of clinical to fax [phone]

## 2022-08-15 NOTE — PHYSICAL THERAPY NOTE
PHYSICAL THERAPY NOTE          Patient Name: Sukumar Burgos  YYVJU'U Date: 8/15/2022       08/15/22 1514   PT Last Visit   PT Visit Date 08/15/22   Note Type   Note Type Treatment   Pain Assessment   Pain Assessment Tool 0-10   Pain Score No Pain   Restrictions/Precautions   Weight Bearing Precautions Per Order No   Other Precautions Chair Alarm; Bed Alarm;Telemetry;Multiple lines; Fall Risk  (L hemiparesis, increased tone (LUE and LLE), LUE flexion pattern)   General   Chart Reviewed Yes   Response to Previous Treatment Patient with no complaints from previous session  Family/Caregiver Present No   Cognition   Overall Cognitive Status WFL   Arousal/Participation Cooperative   Attention Attends with cues to redirect   Orientation Level Oriented X4   Memory Within functional limits   Following Commands Follows one step commands without difficulty   Comments very pleasant to work with   Subjective   Subjective agreeable to participate   Bed Mobility   Supine to Sit 3  Moderate assistance   Additional items Assist x 2; Increased time required;HOB elevated;LE management;Verbal cues   Sit to Supine 3  Moderate assistance   Additional items Assist x 2; Increased time required;Verbal cues;LE management   Additional Comments RLE hooked under LLE to assist with bed mobility   Transfers   Sit to Stand 3  Moderate assistance   Additional items Assist x 2; Increased time required;Verbal cues  (vs maxAx1 with therapist anterior and L knee block)   Stand to Sit 3  Moderate assistance   Additional items Assist x 2; Increased time required;Verbal cues   Additional Comments x3 STS transfers total  R HHA +2nd therapist anterior with L knee block x2 trials and maxAx1 trial with therapist anterior with L knee block     Ambulation/Elevation   Gait pattern Not tested  (focus on standing tolerance and L WB)   Balance   Static Sitting Fair -   Dynamic Sitting Poor +   Static Standing Poor -   Dynamic Standing Poor -   Endurance Deficit   Endurance Deficit Yes   Endurance Deficit Description L hemiparesis   Activity Tolerance   Activity Tolerance Patient tolerated treatment well   Medical Staff Made Aware co-tx with Rickey Galvin 2* medical complexity   Nurse Made Aware pt cleared to see and mobilize per nursing   Exercises   Knee AROM Long Arc Quad Sitting;10 reps;PROM; Left  (c distal quad tapping)   Ankle Pumps Sitting;5 reps;PROM; Left  (with trials of DF quick stretch)   Neuro re-ed LUE rhythmic rotation x8 reps with AAROM into elbow extension (d1 extension)  prolonged pressure applied to distal biceps tendon  Balance training  static/dynamic sitting balance x12-15 min throughout session with S while pt performed LUE reaching tasks with OT-crossing midline and R WS onto elbow performed throughout; static standing with maxAx1 2x30-45 sec progressing to lateral WS x5 reps  Assessment   Prognosis Fair   Problem List Decreased strength; Impaired balance;Decreased endurance;Decreased range of motion;Decreased mobility; Impaired sensation; Impaired tone   Assessment Pt agreeable to participate in PT session  Pt performed functional mobility and therex as outlined above  Demonstrates increased LUE and LLE tone  0/5 MMT strength in LLE however able to hold some weight onto LLE with static standing  Focus on standing tolerance and WS onto LLE to promote weightbearing  Able to lateral WS onto R elbow and return to midline with S while sitting EOB  Pt left supine in bed with bed alarm, call bell, phone, and all personal needs within reach  Pt will continue to benefit from skilled acute care PT to further address their functional mobility limitations  D/C recommendations remain rehab     Barriers to Discharge Inaccessible home environment;Decreased caregiver support   Goals   Patient Goals to improve   STG Expiration Date 08/27/22   PT Treatment Day 1   Plan Treatment/Interventions Functional transfer training;LE strengthening/ROM; Therapeutic exercise; Endurance training;Patient/family training;Equipment eval/education; Bed mobility;Gait training;Spoke to nursing;OT   Progress Progressing toward goals   PT Frequency Other (Comment)  (3-6x/wk)   Recommendation   PT Discharge Recommendation Post acute rehabilitation services   Equipment Recommended   (TBD)   AM-PAC Basic Mobility Inpatient   Turning in Bed Without Bedrails 2   Lying on Back to Sitting on Edge of Flat Bed 1   Moving Bed to Chair 1   Standing Up From Chair 2   Walk in Room 1   Climb 3-5 Stairs 1   Basic Mobility Inpatient Raw Score 8   Turning Head Towards Sound 4   Follow Simple Instructions 4   Low Function Basic Mobility Raw Score 16   Low Function Basic Mobility Standardized Score 25 72   Highest Level Of Mobility   JH-HLM Goal 3: Sit at edge of bed   JH-HLM Achieved 3: Sit at edge of bed   Susana Austin, PT, DPT

## 2022-08-15 NOTE — QUICK NOTE
I spoke to this patient's son and daughter at bedside to provide a comprehensive clinical update  I answered all of their questions and addressed all of their concerns to the best of my ability and to their satisfaction  they tell me that they prefer if the  The patient can [facility]  However, they are open to considering other options based on availability  We also discussed patient has underlying Afib and the change in anticoagulation in detail and the fact that we restarted her BP meds cautiously today  They verbalized understanding and agree with plan

## 2022-08-15 NOTE — CASE MANAGEMENT
Case Management Discharge Planning Note    Patient name Enrike Alvarado  Location [address] [facility] [address]30 MRN [id number]  : 1940 Date 8/15/2022       Current Admission Date: 2022  Current Admission Diagnosis:Arterial ischemic stroke, MCA (middle cerebral artery), right, acute [facility])   Patient Active Problem List    Diagnosis Date Noted    Arterial ischemic stroke, MCA (middle cerebral artery), right, acute ([facility] ) 2022    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 ([facility] ) 2022    Renal insufficiency     Abnormal nuclear stress test 2021    Diverticulosis of colon 2019    Presence of cardiac pacemaker 2018    Tubulovillous adenoma 2018    Gastroesophageal reflux disease without esophagitis 2018    Essential hypertension 2017    Hyperlipidemia LDL goal <100 2017    Stage 3 chronic kidney disease ([facility] [address] ) 2017    Osteoarthritis of both wrists 2017    Chronic atrial fibrillation ([facility] ) 2017    Cavus deformity of foot 2017    Hallux abducto valgus, bilateral 2017    Lipoma of right upper extremity 2017    Pain due to onychomycosis of toenails of both feet 2017    Allergic rhinitis due to pollen 10/12/2015    Midline cystocele 2014    Osteoarthritis of hip 2014    B12 deficiency 2013    Osteopenia 11/15/2013    Left renal artery stenosis ([facility] ) 2013    Left atrial enlargement 2013      LOS (days): 3  Geometric Mean LOS (GMLOS) (days):   Days to GMLOS:     OBJECTIVE:  Risk of Unplanned Readmission Score: 13 53         Current admission status: Inpatient   Preferred Pharmacy:   [facility] [place] [address]  Phone: [phone] Fax: [address], [place] - [address] [facility] [address]  Phone: [phone] Fax: [phone]    Primary Care Provider: Cherise Ramachandran MD    Primary Insurance: Vidya Ellis MEDICARE [facility] REP  Secondary Insurance: Amadou [facility]    DISCHARGE DETAILS:         Additional Comments:     CM performed chart review and is appreciative of weekend CM documentation  The pt continues to receive acute care as of this date  Primary service relayed to this CM medical clearance may be determined by 8/16  Per chart review, PT/OT recommend STR as of 8/13  CM will explore FOC with the pt and proactively forwarded IRF vs SNF referrals on this date  Of note, the pt would require authorization from [facility] to transition to a SNF  CM will introduce self to the pt, explain supportive discharge planning role, and continue to follow for disposition planning

## 2022-08-16 ENCOUNTER — VBI (OUTPATIENT)
Dept: ADMINISTRATIVE | Facility: OTHER | Age: 82
End: 2022-08-16

## 2022-08-16 LAB
ANION GAP SERPL CALCULATED.3IONS-SCNC: 7 MMOL/L (ref 4–13)
BUN SERPL-MCNC: 19 MG/DL (ref 5–25)
CALCIUM SERPL-MCNC: 9 MG/DL (ref 8.3–10.1)
CHLORIDE SERPL-SCNC: 108 MMOL/L (ref 96–108)
CO2 SERPL-SCNC: 22 MMOL/L (ref 21–32)
CREAT SERPL-MCNC: 0.99 MG/DL (ref 0.6–1.3)
ERYTHROCYTE [DISTWIDTH] IN BLOOD BY AUTOMATED COUNT: 14.1 % (ref 11.6–15.1)
GFR SERPL CREATININE-BSD FRML MDRD: 53 ML/MIN/1.73SQ M
GLUCOSE SERPL-MCNC: 109 MG/DL (ref 65–140)
HCT VFR BLD AUTO: 37.8 % (ref 34.8–46.1)
HGB BLD-MCNC: 12 G/DL (ref 11.5–15.4)
MCH RBC QN AUTO: 29.1 PG (ref 26.8–34.3)
MCHC RBC AUTO-ENTMCNC: 31.7 G/DL (ref 31.4–37.4)
MCV RBC AUTO: 92 FL (ref 82–98)
PLATELET # BLD AUTO: 302 THOUSANDS/UL (ref 149–390)
PMV BLD AUTO: 10.9 FL (ref 8.9–12.7)
POTASSIUM SERPL-SCNC: 3.7 MMOL/L (ref 3.5–5.3)
RBC # BLD AUTO: 4.12 MILLION/UL (ref 3.81–5.12)
SODIUM SERPL-SCNC: 137 MMOL/L (ref 135–147)
WBC # BLD AUTO: 6.97 THOUSAND/UL (ref 4.31–10.16)

## 2022-08-16 PROCEDURE — 85027 COMPLETE CBC AUTOMATED: CPT

## 2022-08-16 PROCEDURE — 92526 ORAL FUNCTION THERAPY: CPT

## 2022-08-16 PROCEDURE — 80048 BASIC METABOLIC PNL TOTAL CA: CPT

## 2022-08-16 PROCEDURE — 99232 SBSQ HOSP IP/OBS MODERATE 35: CPT | Performed by: PSYCHIATRY & NEUROLOGY

## 2022-08-16 PROCEDURE — 99222 1ST HOSP IP/OBS MODERATE 55: CPT | Performed by: PHYSICAL MEDICINE & REHABILITATION

## 2022-08-16 RX ADMIN — PRAVASTATIN SODIUM 40 MG: 40 TABLET ORAL at 17:44

## 2022-08-16 RX ADMIN — VERAPAMIL HYDROCHLORIDE 240 MG: 240 TABLET ORAL at 21:30

## 2022-08-16 RX ADMIN — LOSARTAN POTASSIUM 100 MG: 50 TABLET, FILM COATED ORAL at 09:09

## 2022-08-16 RX ADMIN — MAGNESIUM OXIDE TAB 400 MG (241.3 MG ELEMENTAL MG) 400 MG: 400 (241.3 MG) TAB at 09:09

## 2022-08-16 RX ADMIN — FUROSEMIDE 20 MG: 20 TABLET ORAL at 09:09

## 2022-08-16 RX ADMIN — NEBIVOLOL 5 MG: 5 TABLET ORAL at 11:36

## 2022-08-16 RX ADMIN — SPIRONOLACTONE 25 MG: 25 TABLET ORAL at 09:09

## 2022-08-16 RX ADMIN — DABIGATRAN ETEXILATE MESYLATE 150 MG: 150 CAPSULE ORAL at 21:30

## 2022-08-16 RX ADMIN — DABIGATRAN ETEXILATE MESYLATE 150 MG: 150 CAPSULE ORAL at 09:09

## 2022-08-16 NOTE — ARC ADMISSION
Contacted [employer]  To request authorization for Acute Inpt Rehab, Pending Authorization # [id number]  Faxed clinicals as requested to Fax # [phone]

## 2022-08-16 NOTE — PLAN OF CARE
Problem: Prexisting or High Potential for Compromised Skin Integrity  Goal: Skin integrity is maintained or improved  Description: INTERVENTIONS:  - Identify patients at risk for skin breakdown  - Assess and monitor skin integrity  - Assess and monitor nutrition and hydration status  - Monitor labs   - Assess for incontinence   - Turn and reposition patient  - Assist with mobility/ambulation  - Relieve pressure over bony prominences  - Avoid friction and shearing  - Provide appropriate hygiene as needed including keeping skin clean and dry  - Evaluate need for skin moisturizer/barrier cream  - Collaborate with interdisciplinary team   - Patient/family teaching  - Consider wound care consult   Outcome: Progressing     Problem: MOBILITY - ADULT  Goal: Maintain or return to baseline ADL function  Description: INTERVENTIONS:  -  Assess patient's ability to carry out ADLs; assess patient's baseline for ADL function and identify physical deficits which impact ability to perform ADLs (bathing, care of mouth/teeth, toileting, grooming, dressing, etc )  - Assess/evaluate cause of self-care deficits   - Assess range of motion  - Assess patient's mobility; develop plan if impaired  - Assess patient's need for assistive devices and provide as appropriate  - Encourage maximum independence but intervene and supervise when necessary  - Involve family in performance of ADLs  - Assess for home care needs following discharge   - Consider OT consult to assist with ADL evaluation and planning for discharge  - Provide patient education as appropriate  Outcome: Progressing  Goal: Maintains/Returns to pre admission functional level  Description: INTERVENTIONS:  - Perform BMAT or MOVE assessment daily    - Set and communicate daily mobility goal to care team and patient/family/caregiver  - Collaborate with rehabilitation services on mobility goals if consulted  - Perform Range of Motion 3 times a day    - Reposition patient every 2 hours   - Dangle patient 3 times a day  - Stand patient 3 times a day  - Ambulate patient 3 times a day  - Out of bed to chair 3 times a day   - Out of bed for meals 3 times a day  - Out of bed for toileting  - Record patient progress and toleration of activity level   Outcome: Progressing     Problem: PAIN - ADULT  Goal: Verbalizes/displays adequate comfort level or baseline comfort level  Description: Interventions:  - Encourage patient to monitor pain and request assistance  - Assess pain using appropriate pain scale  - Administer analgesics based on type and severity of pain and evaluate response  - Implement non-pharmacological measures as appropriate and evaluate response  - Consider cultural and social influences on pain and pain management  - Notify physician/advanced practitioner if interventions unsuccessful or patient reports new pain  Outcome: Progressing     Problem: INFECTION - ADULT  Goal: Absence or prevention of progression during hospitalization  Description: INTERVENTIONS:  - Assess and monitor for signs and symptoms of infection  - Monitor lab/diagnostic results  - Monitor all insertion sites, i e  indwelling lines, tubes, and drains  - Monitor endotracheal if appropriate and nasal secretions for changes in amount and color  - [place] appropriate cooling/warming therapies per order  - Administer medications as ordered  - Instruct and encourage patient and family to use good hand hygiene technique  - Identify and instruct in appropriate isolation precautions for identified infection/condition  Outcome: Progressing  Goal: Absence of fever/infection during neutropenic period  Description: INTERVENTIONS:  - Monitor WBC    Outcome: Progressing     Problem: SAFETY ADULT  Goal: Maintain or return to baseline ADL function  Description: INTERVENTIONS:  -  Assess patient's ability to carry out ADLs; assess patient's baseline for ADL function and identify physical deficits which impact ability to perform ADLs (bathing, care of mouth/teeth, toileting, grooming, dressing, etc )  - Assess/evaluate cause of self-care deficits   - Assess range of motion  - Assess patient's mobility; develop plan if impaired  - Assess patient's need for assistive devices and provide as appropriate  - Encourage maximum independence but intervene and supervise when necessary  - Involve family in performance of ADLs  - Assess for home care needs following discharge   - Consider OT consult to assist with ADL evaluation and planning for discharge  - Provide patient education as appropriate  Outcome: Progressing  Goal: Maintains/Returns to pre admission functional level  Description: INTERVENTIONS:  - Perform BMAT or MOVE assessment daily    - Set and communicate daily mobility goal to care team and patient/family/caregiver  - Collaborate with rehabilitation services on mobility goals if consulted  - Perform Range of Motion 3 times a day  - Reposition patient every 2 hours    - Dangle patient 3 times a day  - Stand patient 3 times a day  - Ambulate patient 3 times a day  - Out of bed to chair 3 times a day   - Out of bed for meals 3 times a day  - Out of bed for toileting  - Record patient progress and toleration of activity level   Outcome: Progressing  Goal: Patient will remain free of falls  Description: INTERVENTIONS:  - Educate patient/family on patient safety including physical limitations  - Instruct patient to call for assistance with activity   - Consult OT/PT to assist with strengthening/mobility   - Keep Call bell within reach  - Keep bed low and locked with side rails adjusted as appropriate  - Keep care items and personal belongings within reach  - Initiate and maintain comfort rounds  - Make Fall Risk Sign visible to staff  - Apply yellow socks and bracelet for high fall risk patients  - Consider moving patient to room near nurses station  Outcome: Progressing     Problem: DISCHARGE PLANNING  Goal: Discharge to home or other facility with appropriate resources  Description: INTERVENTIONS:  - Identify barriers to discharge w/patient and caregiver  - Arrange for needed discharge resources and transportation as appropriate  - Identify discharge learning needs (meds, wound care, etc )  - Arrange for interpretive services to assist at discharge as needed  - Refer to Case Management Department for coordinating discharge planning if the patient needs post-hospital services based on physician/advanced practitioner order or complex needs related to functional status, cognitive ability, or social support system  Outcome: Progressing     Problem: Knowledge Deficit  Goal: Patient/family/caregiver demonstrates understanding of disease process, treatment plan, medications, and discharge instructions  Description: Complete learning assessment and assess knowledge base    Interventions:  - Provide teaching at level of understanding  - Provide teaching via preferred learning methods  Outcome: Progressing     Problem: Potential for Falls  Goal: Patient will remain free of falls  Description: INTERVENTIONS:  - Educate patient/family on patient safety including physical limitations  - Instruct patient to call for assistance with activity   - Consult OT/PT to assist with strengthening/mobility   - Keep Call bell within reach  - Keep bed low and locked with side rails adjusted as appropriate  - Keep care items and personal belongings within reach  - Initiate and maintain comfort rounds  - Make Fall Risk Sign visible to staff  - Apply yellow socks and bracelet for high fall risk patients  - Consider moving patient to room near nurses station  Outcome: Progressing

## 2022-08-16 NOTE — PROGRESS NOTES
NEUROLOGY RESIDENCY PROGRESS NOTE     Name: Gold Salazar   Age & Sex: 80 y o  female   MRN: [id number]  Unit/Bed#: [facility] 5-12   Encounter: [id number]    Aron Rosas need follow up in in 6 weeks with neurovascular Attending/AP/Resident      Pending for discharge: pending auth for placement    ASSESSMENT & PLAN     * Arterial ischemic stroke, MCA (middle cerebral artery), right, acute [facility])  Assessment & Plan  80 y o  F presented to [facility] on 8/12/2022 as a stroke alert  Presenting deficits: contracted and weak LUE, flaccid LLE, left facial droop, mild dysarthria, and mild sensory deficits on the left side  Upon arrival to the ED, /80 on arrival   NIHSS 11  CTA h/n revealed near occlusive thormbus at R MCA bifurcation  Not a TNK candidate due to Xarelto use the night prior; not a thrombectomy candidate per Neurosurgery  Was started on stroke protocol heparin gtt and admitted on the stroke pathway  Pertinent PMHx: Afib on Xarelto, sick sinus syndrome s/p pacemaker (not MRI conditional), HTN, and HLD      [place]:    · Lipid Panel: Cholesterol 100, LDL 38   · Hemoglobin A1c 5 4  · TTE: Left Atrium: moderately dilated  · CTH: focal area of gliosis within the high right postcentral gyrus and scattered chronic microvascular ischemic changes with more focal lucency along the anterior limb of the right internal capsule, but no acute hemorrhage  · CTA h/n: near occlusive thrombus at the right MCA bifurcation, mild beading of the mid to distal ICAs suspicious for mild fibromuscular dysplasia, and mild atherosclerotic disease at bilateral distal carotid arteries      · CTP: 8 mL mismatch  · Repeat 14 [facility] 8/12 revealed subtle loss of gray-white matter differentiation in the right temporal lobe and insular cortex in keeping with right MCA territory infarction    - Unable to get MRI as pacemaker is not compatible   Exam: brisk reflexes in LUE, spasticity in LUE, LLE, hemiplegia in LUE, LLE     Impression: Etiology remains unclear, but most likely due to Xarelto failure in the setting of known afib  No known cancer or hypercoagulable history  Plan:  - Stroke pathway  - on Pradaxa 150 mg BID for AC (was on xarelto previously)  - Previously on Crestor 10 mg, recommend to decrease to Crestor 5 mg (LDL 38)   - changed to alternative pravastatin 40mg equivalent to crestor 5mg  - BP goals: normotension; cautiously restarted home meds  - Euglycemic, normothermic goal  - Continue telemetry  - PT/OT/ST recommended inpt rehab and family prefers [facility] and CM working on auth/placement    - pending PMR eval  - Stroke education  - Frequent neuro checks  Continue to monitor and notify neurology with any changes   - STAT CT head for any acute change in neuro exam    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 [facility])  Assessment & Plan  - CTA head/neck - near occlusive thrombus at the R MCA bifurcation, mild beading of the mid to distal ICAs suspicious for mild fibromuscular dysplasia, and mild atherosclerotic disease at bilateral distal carotid arteries  -  Switch AC to Pradaxa on 8/15  - will likely need CTA h/n in 3 months to re-evaluate thrombus     Chronic atrial fibrillation (HCC)  Assessment & Plan  · On Xarelto 15 mg daily QHS  No missed doses  Last dose on 8/11 before going to bed  · Was on stroke protocol heparin gtt (PTT goal 50-70)  · Start Pradaxa 8/15    Hyperlipidemia LDL goal <100  Assessment & Plan  · Upon DC, recommend Crestor 5 mg daily (previously on 10 mg at home)   · on pravastatin 40 mg equivalent to Crestor 5 mg    Essential hypertension  Assessment & Plan  · Normotension; will cautiously restart home meds (Verapamil 240 mg QHS, Spirono 25 mg QD, Cozaar 100 mg QD, Lasix 20 mg QD)   · Close hemodynamic monitoring  · Low threshold to hold off Verapamil at night time if BP during the day drops significantly             SUBJECTIVE     Patient was seen and examined   No acute events overnight  She had no acute concerns and is wanting to go to [facility] for rehab  She mentioned she was trying to urinate earlier and had trouble actually going  She understands she will be put on urinary retention protocol  She denies any fevers/chill, chest pain, shortness of breath, abdominal pain, nausea, vomiting, diarrhea, problems urinating, problems  With bowel movements, and is eating/drinking without problem     She denies any headaches, syncope, paresthesias, diplopia, visual changes, tinnitus, worsening weakness, garbled speech, dysarthria/slurring of words,  Loss of bowel or bladder          Review of Systems   All other systems reviewed and are negative  OBJECTIVE     Patient ID: Keren Vernon is a 80 y o  female  Vitals:    08/15/22 2119 22 0530 22 0714 22 0907   BP: 143/70  125/59 156/73   Pulse:   60 63   Resp:       Temp:   97 6 °F (36 4 °C)    TempSrc:       SpO2:   93% 98%   Weight:  62 7 kg (138 lb 3 7 oz)     Height:          Temperature:   Temp (24hrs), Av 9 °F (36 6 °C), Min:97 6 °F (36 4 °C), Max:98 1 °F (36 7 °C)    Temperature: 97 6 °F (36 4 °C)            Physical exam:  Vitals reviewed  General Examination: No distress, cooperative  CVS: S1, S2 noted  Regular rate, rhythm       Neurological exam:     Mental Status  A, Ox3  Follows simple, 3 step commands  Speech: fluent, normal rhythm, no dysarthria       Cranial Nerves  CN II: Visual fields full to confrontation  CN III, IV, VI: EOMI bilaterally  Normal lids and orbits bilaterally  Pupils equal round and reactive to light bilaterally  No nystagmus  CN V: Facial sensation is normal   CN VII:  Right: There is no facial weakness or asymmetry  Left: There is no facial weakness or asymmetry  CN VIII: Audition intact to finger rub bilaterally  CN IX, X: Uvula midline   Palate elevates symmetrically  CN XI:  Right: Sternocleidomastoid strength is normal  Trapezius strength is normal   Left: Sternocleidomastoid strength is normal  Trapezius strength is normal   CN XII: Tongue midline without atrophy or fasciculations with appropriate movement      Motor  Spasticity, hemiplegia in left upper, lower extremities  5/5 strength in right upper, lower extremities throughout      Sensory  RUE, RLE: light touch, pinprick sensation preserved  LUE, LLE: decreased light touch, pinprick sensations      Reflexes Right Left   Brachioradialis     +2  +3   Biceps                                   +2  +3   Triceps  +2  +3   Patellar   +3  +3      Ankle clonus absent b/l  Plantars mute bilaterally      Coordination  Finger-to-nose-normal on the right       Gait  Deferred      LABORATORY DATA     Labs: I have personally reviewed pertinent reports  and I have personally reviewed pertinent films in PACS  Results from last 7 days   Lab Units 08/16/22  0457 08/15/22  0500 08/13/22  0555   WBC Thousand/uL 6 97 9 91 8 00   HEMOGLOBIN g/dL 12 0 11 9 10 2*   HEMATOCRIT % 37 8 38 0 31 4*   PLATELETS Thousands/uL 302 243 199   NEUTROS PCT %  --   --  73   MONOS PCT %  --   --  10      Results from last 7 days   Lab Units 08/16/22  0457 08/15/22  0500 08/13/22  0555   SODIUM mmol/L 137 136 140   POTASSIUM mmol/L 3 7 4 1 3 7   CHLORIDE mmol/L 108 110* 111*   CO2 mmol/L 22 20* 21   BUN mg/dL 19 15 16   CREATININE mg/dL 0 99 0 87 0 87   CALCIUM mg/dL 9 0 8 8 8 4     Results from last 7 days   Lab Units 08/13/22  0555   MAGNESIUM mg/dL 1 8     Results from last 7 days   Lab Units 08/13/22  0555   PHOSPHORUS mg/dL 2 9      Results from last 7 days   Lab Units 08/15/22  0905 08/14/22  2032 08/14/22  0746 08/12/22  2153 08/12/22  1702 08/12/22  0940   INR   --   --   --   --  1 57* 1 86*   PTT seconds 64* 59* 68*   < > 50*  57* 29    < > = values in this interval not displayed  IMAGING & DIAGNOSTIC TESTING     Radiology Results: I have personally reviewed pertinent reports     and I have personally reviewed pertinent films in PACS    CTA head and neck w wo contrast   Final Result by Jyoti Pike MD (08/15 1253)      Evolving right MCA distribution infarct  No evidence for secondary parenchymal hematoma  Interval recanalization of previously seen thrombus at the right MCA bifurcation  Middle cerebral arteries are patent on the current exam without evidence for major branch vessel occlusion  Stable beading of the mid to distal internal carotid arteries again suggestive of mild fibromuscular dysplasia  Workstation performed: [id number]         CT head wo contrast   Final Result by Jennifer Randolph MD (08/13 1301)      Interval development of subtle loss of gray-white matter differentiation in the right temporal lobe and insular cortex in keeping with right MCA territory infarction (series 2 images 11-14 )  There is no hemorrhagic conversion  Workstation performed: [id number]         CT cerebral perfusion   Final Result by Jyoti Pike MD (08/12 1108)      CT perfusion performed  Data available on PACS  Workstation performed: [id number]         CTA stroke alert (head/neck)   Final Result by Jyoti Pike MD (08/12 1032)      Near occlusive thrombus at the right MCA bifurcation  Mild beading of the mid to distal internal carotid arteries suspicious for mild fibromuscular dysplasia  Atherosclerotic plaque at the carotid bulbs without evidence for high-grade stenosis or focal occlusion of the cervical vasculature  Partially imaged pulmonary edema and layering bilateral pleural effusions  Findings were directly discussed with Diana Rivera at 10:00 AM                         Workstation performed: [id number]         CT stroke alert brain   Final Result by Jyoti Pike MD (08/12 1021)      No definite CT evidence for large acute vascular distribution infarct or acute intracranial hemorrhage        Findings were directly discussed with Diana Rivera at 10:00 AM  Workstation performed: [id number]             Other Diagnostic Testing: I have personally reviewed pertinent reports  and I have personally reviewed pertinent films in PACS    ACTIVE MEDICATIONS     Current Facility-Administered Medications   Medication Dose Route Frequency    acetaminophen (TYLENOL) tablet 650 mg  650 mg Oral Q6H PRN    dabigatran etexilate (PRADAXA) capsule 150 mg  150 mg Oral Q12H [facility]    furosemide (LASIX) tablet 20 mg  20 mg Oral Daily    losartan (COZAAR) tablet 100 mg  100 mg Oral Daily    magnesium oxide (MAG-OX) tablet 400 mg  400 mg Oral Daily    nebivolol (BYSTOLIC) tablet 5 mg  5 mg Oral Daily    pravastatin (PRAVACHOL) tablet 40 mg  40 mg Oral Daily With Dinner    spironolactone (ALDACTONE) tablet 25 mg  25 mg Oral Daily    verapamil (CALAN-SR) CR tablet 240 mg  240 mg Oral HS       Prior to Admission medications    Medication Sig Start Date End Date Taking?  Authorizing Provider   apixaban (Eliquis) 5 mg Take 1 tablet (5 mg total) by mouth 2 (two) times a day 8/14/22 9/13/22 Yes Twyla Vasquez 96, DO   dabigatran etexilate (PRADAXA) 150 mg capsu Take 1 capsule (150 mg total) by mouth 2 (two) times a day PRICE CHECK DO NOT FILL 8/14/22 9/13/22 Yes Twyla Vasquez 96, DO   Blood Pressure Monitoring (Blood Pressure Cuff) MISC Use every other day For HTN 4/28/21   Antonio Gomes PA-C   cyanocobalamin 1,000 mcg/mL INJECT 1 ML (1,000 MCG TOTAL) INTO A MUSCLE EVERY 30 (THIRTY) DAYS 1/7/22   Vipul Vigil MD   furosemide (LASIX) 20 mg tablet Take 1 tablet (20 mg total) by mouth daily 5/3/22   Krysten Franz MD   losartan (COZAAR) 100 MG tablet Take 1 tablet (100 mg total) by mouth daily 3/23/21   Javon Pierce MD   magnesium oxide (MAG-OX) 400 mg TAKE 1 TABLET (400 MG TOTAL) BY MOUTH DAILY 1/26/22   Cecily Jolley,    nebivolol (BYSTOLIC) 5 mg tablet Take 1 tablet (5 mg total) by mouth daily 12/14/21   Krysten Franz MD   polyvinyl alcohol (LIQUIFILM TEARS) 1 4 % ophthalmic solution Administer 1 drop to the right eye as needed for dry eyes 12/13/21   Segun Echeverria DO   potassium chloride (MICRO-K) 10 MEQ CR capsule Take 1 capsule (10 mEq total) by mouth daily 5/3/22   Alfa Anaya MD   rivaroxaban (Xarelto) 15 mg tablet Take 1 tablet (15 mg total) by mouth daily at bedtime 1/18/22   Dewayne Davidson PA-C   rosuvastatin (CRESTOR) 10 MG tablet TAKE 1 TABLET (10 MG TOTAL) BY MOUTH DAILY 6/2/22   Crow Bowman MD   spironolactone (ALDACTONE) 25 mg tablet Take 25 mg by mouth daily 3/28/22   Historical Provider, MD   triamcinolone (KENALOG) 0 1 % cream Apply topically 2 (two) times a day Apply to left arm topically 2 times daily for 1 week 5/2/22   Wellington Katz MD   verapamil (CALAN-SR) 240 mg CR tablet TAKE 1 TABLET (240 MG TOTAL) BY MOUTH DAILY AT BEDTIME 7/26/22   Wellington Katz MD         VTE Pharmacologic Prophylaxis: Dabigatran (Pradaxa)  VTE Mechanical Prophylaxis: sequential compression device    ==  Augusto Fink [address]  Neurology Residency, PGY-2

## 2022-08-16 NOTE — [facility] ADMISSION
Per communication with IZAIAH Staples anticipate patient may be cleared for discharge within the next day and is agreeable to [facility]  [facility] Liaison will initiate request for insurance authorization for Acute Inpt Rehab

## 2022-08-16 NOTE — CONSULTS
PHYSICAL MEDICINE AND REHABILITATION CONSULT NOTE  Nilam Madrid 80 y o  female MRN: [id number]  Unit/Bed#: [address] 702-01 Encounter: [id number]    Requested by (Physician/Service): Kenisha Moon MD  Reason for Consultation:  Assessment of rehabilitation needs    Assessment:  Rehabilitation Diagnosis:    Right MCA ischemic CVA   Left hemiparesis with spasticity   Emerging left-sided spasticity   Impaired sensation left hemibody   Impaired mobility and self care   Impaired cognition     Recommendations:  Rehabilitation Plan:   Continue PT/OT/SLP while on acute care   At home, patient has assistance from her  and a home health aide several hours per week   Please price check Pradaxa   After medical stabilization, patient is an appropriate candidate for acute inpatient rehabilitation center = [facility] (most intense inpatient rehabilitation program with 3 hours/day or 6 sessions per day of therapies) in order to maximize function  Pending insurance authorization and bed availability  Patient lives alone and may have some support from her son post discharge and a HHA  o Spasticity:  Would benefit from physiatry management of spasticity in acute rehab program   Patient developing moderate-severe spasticity in LUE and mild in LLE  Would benefit from starting Baclofen 5 mg BID and providing passive stretch daily by therapy team or nursing team especially of LUE  Patient may be a good candidate for eventual BOTOX injections additionally  o Bowel program: Would benefit from initiating Colace/Senna and PRN Miralax     Covid-19 Testing: [facility] inpatient rehabilitation units require testing within 48 hours of all potential admissions at this time  *Re-testing is NOT required for patients recovering from COVID-19 infection if isolation has been discontinued per CDC criteria            Medical Co-morbidities Plan:  Chronic atrial fibrillation ( previously on Xarelto)  Sick sinus syndrome status post previous pacemaker implant  Hyperlipidemia  Hypertension  Right MCA ischemic stroke    Thank you for this consultation  Do not hesitate to contact service with further questions  Renee Valdez MD  PM&R    History of Present Illness:  Chalino Trujillo is a 80 y o  female LEFT HANDED with  has a past medical history of A-fib ([address] ), Anemia, Arthritis, Breast pain, right, Chest pain on breathing, Colon polyp, Cough, Diverticulosis, Encounter for screening colonoscopy, H/O sick sinus syndrome, Heart murmur, High cholesterol, History of atrial fibrillation, History of weight loss, long term use of blood thinners, Hypertension, Irregular heart beat, Pacemaker, Preop examination, Shortness of breath, and Viral bronchitis     Patient presented to the [address] on 8/12/22 with stroke-like symptoms including left hemiparesis, left facial droop and impaired sensation  Patient also presenting with hypertensive urgency on arrival   Imaging with [address] [place] [address] Street revealed evidence of a right MCA territory ischemic stroke  CTA head and neck also showing near occlusive thrombus of the right MCA bifurcation, suspicious for fibromuscular dysplasia  Not a candidate for thrombectomy  Patient has a chronic pacemaker implant which is not MRI compatible  Initially placed on heparin drip  Repeat CTH stable therefore, patient seen by neurology and switched from Xarelto to Pradaxa 150 mg b i d  For her anticoagulation  Patient had her statin reduced to pravastatin 40 mg daily  Patient seen face to face  Smiling  Following commands  Understands basic english and speaks some english too - primarily [address]    She is worried about who will help her as her son works full time    Review of Systems: 10 point ROS negative except for what is noted in HPI    Function:  Prior level of function and living situation:  Patient previously lived alone in a apartment with elevator in place    Previously very independent with mobility and self care overall  Needed help with cooking/cleaning from [address] prior  Current level of function:  Physical Therapy:  Bed mobility is max assist, max assist with transfers, max assist with attempt to stand however not able to complete  Occupational Therapy:  Mod-max assist for grooming  Max assist for toileting  Speech Therapy:  Cleared for regular diet with thin liquids      Physical Exam:  /73   Pulse 63   Temp 97 6 °F (36 4 °C)   Resp 20   Ht 5' 3" (1 6 m)   Wt 62 7 kg (138 lb 3 7 oz)   SpO2 98%   BMI 24 49 kg/m²        Intake/Output Summary (Last 24 hours) at 8/16/2022 0947  Last data filed at 8/15/2022 2300  Gross per 24 hour   Intake --   Output 400 ml   Net -400 ml       Body mass index is 24 49 kg/m²  Physical Exam  Vitals and nursing note reviewed  Constitutional:       General: She is not in acute distress  HENT:      Head: Normocephalic and atraumatic  Nose: Nose normal       Mouth/Throat:      Mouth: Mucous membranes are moist    Eyes:      Conjunctiva/sclera: Conjunctivae normal    Cardiovascular:      Rate and Rhythm: Normal rate and regular rhythm  Pulses: Normal pulses  Pulmonary:      Effort: Pulmonary effort is normal       Breath sounds: Normal breath sounds  No wheezing or rales  Abdominal:      General: Bowel sounds are normal  There is no distension  Palpations: Abdomen is soft  Tenderness: There is no abdominal tenderness  Musculoskeletal:         General: No swelling  Cervical back: Neck supple  Skin:     General: Skin is warm  Neurological:      Mental Status: She is alert and oriented to person, place, and time  Motor: Weakness (Left hemiparesis 2/5 LUE and 1/5 LLE) present        Comments: Moderate - severe spasticity in Left elbow flexors/extensors  Mild spasticity in hip and knee flexors   Psychiatric:         Mood and Affect: Mood normal               Social History:    Social History     Socioeconomic History  Marital status:      Spouse name: None    Number of children: None    Years of education: None    Highest education level: None   Occupational History    Occupation: retired   Tobacco Use    Smoking status: Never Smoker    Smokeless tobacco: Never Used   Vaping Use    Vaping Use: Never used   Substance and Sexual Activity    Alcohol use: Not Currently    Drug use: No    Sexual activity: Not Currently     Comment:    Other Topics Concern    None   Social History Narrative    Housing, household, and economic circumstances      Social Determinants of Health     Financial Resource Strain: Low Risk     Difficulty of Paying Living Expenses: Not hard at all   Food Insecurity: No Food Insecurity    Worried About [address] in the Last Year: Never true    [address] in the Last Year: Never true   Transportation Needs: No Transportation Needs    Lack of Transportation (Medical): No    Lack of Transportation (Non-Medical):  No   Physical Activity: Not on file   Stress: Not on file   Social Connections: Not on file   Intimate Partner Violence: Not on file   Housing Stability: Low Risk     Unable to Pay for Housing in the Last Year: No    Number of Places Lived in the Last Year: 1    Unstable Housing in the Last Year: No        Family History:    Family History   Problem Relation Age of Onset    Diabetes Mother     Breast cancer Sister 48    No Known Problems Father     No Known Problems Maternal Grandmother     No Known Problems Maternal Grandfather     No Known Problems Paternal Grandmother     No Known Problems Paternal Grandfather     No Known Problems Daughter     No Known Problems Son     No Known Problems Sister     No Known Problems Sister     No Known Problems Brother     No Known Problems Brother     No Known Problems Sister     No Known Problems Paternal Aunt     No Known Problems Paternal Aunt     No Known Problems Paternal Aunt     No Known Problems Paternal Aunt          Medications:     Current Facility-Administered Medications:     acetaminophen (TYLENOL) tablet 650 mg, 650 mg, Oral, Q6H PRN, Evelina Robin MD, 650 mg at 08/15/22 1934    dabigatran etexilate (PRADAXA) capsule 150 mg, 150 mg, Oral, Q12H [address], Harsha Lewis MD, 150 mg at 08/16/22 0909    furosemide (LASIX) tablet 20 mg, 20 mg, Oral, Daily, Harsha Lewis MD, 20 mg at 08/16/22 0909    losartan (COZAAR) tablet 100 mg, 100 mg, Oral, Daily, Harhsa Lewis MD, 100 mg at 08/16/22 0909    magnesium oxide (MAG-OX) tablet 400 mg, 400 mg, Oral, Daily, Harsha Lewis MD, 400 mg at 08/16/22 0909    nebivolol (BYSTOLIC) tablet 5 mg, 5 mg, Oral, Daily, Harsha Lewis MD, 5 mg at 08/15/22 1333    pravastatin (PRAVACHOL) tablet 40 mg, 40 mg, Oral, Daily With Dinner, [address], DO, 40 mg at 08/15/22 1737    spironolactone (ALDACTONE) tablet 25 mg, 25 mg, Oral, Daily, Harsha Lewis MD, 25 mg at 08/16/22 0909    verapamil (CALAN-SR) CR tablet 240 mg, 240 mg, Oral, HS, Harsha Lewis MD, 240 mg at 08/15/22 2119    Past Medical History:     Past Medical History:   Diagnosis Date    [facility]     Anemia     Arthritis     Breast pain, right     Chest pain on breathing     Colon polyp     Cough     Diverticulosis     Encounter for screening colonoscopy     H/O sick sinus syndrome     Heart murmur     High cholesterol     History of atrial fibrillation     Rate ontrolled  On xarelto 15mg (creatinine 50) Followed by Dr Katie Cespedes with Cardiology     History of weight loss     Report loose fitting clothes  Weight stable (3lbs difference)  Last colo showed small tubular adenoma x2  Breast biopsy last showed fibrocystic changes  TSH wnl  Will check cbc, bmp, spep          Hx of long term use of blood thinners     Hypertension     Irregular heart beat     Pacemaker     Preop examination     Shortness of breath     Viral bronchitis         Past Surgical History:     Past Surgical History:   Procedure Laterality Date    BREAST BIOPSY Right 08/17/2006    ultrasound guided Percutaneous Needle Core -Benign    CATARACT EXTRACTION      CATARACT EXTRACTION W/ INTRAOCULAR LENS  IMPLANT, BILATERAL      COLONOSCOPY      COLONOSCOPY N/A 5/22/2018    Procedure: COLONOSCOPY;  Surgeon: Orlin Monroe DO;  [facility]; Service: Gastroenterology    Juanita Goins / Ramana Gomez / Macarena Yohannesruth ann Right     as a child after a fall    MAMMO STEREOTACTIC BREAST BIOPSY RIGHT (ALL INC) Right 10/2014    benign    DC CMBND ANTERPOST COLPORRAPHY W/CYSTO N/A 3/17/2016    Procedure: COLPORRHAPHY ANTERIOR POSTERIOR ;  Surgeon: Gayatri Schaeffer MD;  [facility];  Service: Gynecology    DC COLONOSCOPY FLX DX W/COLLJ [address] WHEN PFRMD N/A 4/4/2019    Procedure: COLONOSCOPY;  Surgeon: Orlin Monroe DO;  [facility]; Service: Gastroenterology    DC CYSTOURETHROSCOPY N/A 3/17/2016    Procedure: CYSTOSCOPY;  Surgeon: Gayatri Schaeffer MD;  [facility];  Service: Gynecology    DC ESOPHAGOGASTRODUODENOSCOPY TRANSORAL DIAGNOSTIC N/A 4/16/2018    Procedure: EGD AND COLONOSCOPY;  Surgeon: Orlin Monroe DO;  [facility]; Service: Gastroenterology    DC LAP,SURG,COLECTOMY, PARTIAL, W/ANAST Right 1/13/2022    Procedure: Diagnostic laparoscopy, laparscopic right colectomy;  Surgeon: Ami Darby MD;  [facility];  Service: Colorectal    DC REVAGINAL PROLAPSE,SACROSP LIG N/A 3/17/2016    Procedure: COLPOPEXY VAGINAL EXTRAPERITONEAL (VEC) ANTERIOR ;  Surgeon: Gayatri Schaeffer MD;  [facility];  Service: Gynecology    DC SLING OPER STRES INCONTINENCE N/A 3/17/2016    Procedure: INSERTION PUBOVAGINAL SLING SINGLE INCISION ;  Surgeon: Gayatri Schaeffer MD;  [facility];  Service: Gynecology         Allergies:      Allergies   Allergen Reactions    Other Itching     Bandaids    Tape [Medical Tape] Itching LABORATORY RESULTS:      Lab Results   Component Value Date    HGB 12 0 08/16/2022    HGB 12 3 11/25/2015    HCT 37 8 08/16/2022    HCT 37 8 11/25/2015    WBC 6 97 08/16/2022    WBC 6 56 11/25/2015     Lab Results   Component Value Date    BUN 19 08/16/2022    BUN 24 11/25/2015     11/25/2015    K 3 7 08/16/2022    K 4 0 11/25/2015     08/16/2022     11/25/2015    GLUCOSE 88 11/25/2015    CREATININE 0 99 08/16/2022    CREATININE 1 09 11/25/2015     Lab Results   Component Value Date    PROTIME 19 0 (H) 08/12/2022    PROTIME 23 3 (H) 05/20/2014    INR 1 57 (H) 08/12/2022    INR 2 14 (H) 05/20/2014        DIAGNOSTIC STUDIES: Reviewed  CTA head and neck w wo contrast    Result Date: 8/15/2022  Impression: Evolving right MCA distribution infarct  No evidence for secondary parenchymal hematoma  Interval recanalization of previously seen thrombus at the right MCA bifurcation  Middle cerebral arteries are patent on the current exam without evidence for major branch vessel occlusion  Stable beading of the mid to distal internal carotid arteries again suggestive of mild fibromuscular dysplasia  Workstation performed: [id number]     CT head wo contrast    Result Date: 8/13/2022  Impression: Interval development of subtle loss of gray-white matter differentiation in the right temporal lobe and insular cortex in keeping with right MCA territory infarction (series 2 images 11-14 )  There is no hemorrhagic conversion  Workstation performed: [id number]     CT stroke alert brain    Result Date: 8/12/2022  Impression: No definite CT evidence for large acute vascular distribution infarct or acute intracranial hemorrhage  Findings were directly discussed with Ashlyn Woodard at 10:00 AM  Workstation performed: [id number]     CT cerebral perfusion    Result Date: 8/12/2022  Impression: CT perfusion performed  Data available on PACS   Workstation performed: [id number]     CTA stroke alert (head/neck)    Result Date: 8/12/2022  Impression: Near occlusive thrombus at the right MCA bifurcation  Mild beading of the mid to distal internal carotid arteries suspicious for mild fibromuscular dysplasia  Atherosclerotic plaque at the carotid bulbs without evidence for high-grade stenosis or focal occlusion of the cervical vasculature  Partially imaged pulmonary edema and layering bilateral pleural effusions   Findings were directly discussed with Lenore Renteria at 10:00 AM  Workstation performed: [id number]

## 2022-08-16 NOTE — SPEECH THERAPY NOTE
Speech Language/Pathology    Speech/Language Pathology Progress Note    Patient Name: Homa Whitt  NAFWJ'V Date: 8/16/2022     Problem List  Principal Problem:    Arterial ischemic stroke, MCA (middle cerebral artery), right, acute ([facility] )  Active Problems:    Essential hypertension    Hyperlipidemia LDL goal <100    [facility] )    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 [facility])       Past Medical History  Past Medical History:   Diagnosis Date    [facility] ([facility] )     Anemia     Arthritis     Breast pain, right     Chest pain on breathing     Colon polyp     Cough     Diverticulosis     Encounter for screening colonoscopy     H/O sick sinus syndrome     Heart murmur     High cholesterol     History of atrial fibrillation     Rate ontrolled  On xarelto 15mg (creatinine 50) Followed by Dr Severino Motta with Cardiology     History of weight loss     Report loose fitting clothes  Weight stable (3lbs difference)  Last colo showed small tubular adenoma x2  Breast biopsy last showed fibrocystic changes  TSH wnl  Will check cbc, bmp, spep   Hx of long term use of blood thinners     Hypertension     Irregular heart beat     Pacemaker     Preop examination     Shortness of breath     Viral bronchitis         Past Surgical History  Past Surgical History:   Procedure Laterality Date    BREAST BIOPSY Right 08/17/2006    ultrasound guided Percutaneous Needle Core -Benign    CATARACT EXTRACTION      CATARACT EXTRACTION W/ INTRAOCULAR LENS  IMPLANT, BILATERAL      COLONOSCOPY      COLONOSCOPY N/A 5/22/2018    Procedure: COLONOSCOPY;  Surgeon: David Trinidad DO;  [facility];   Service: Gastroenterology    Burt Epperson / Forrest Pagan / Connie Mehta Right     as a child after a fall    MAMMO STEREOTACTIC BREAST BIOPSY RIGHT (ALL INC) Right 10/2014    benign    AR CMBND ANTERPOST COLPORRAPHY W/CYSTO N/A 3/17/2016    Procedure: COLPORRHAPHY ANTERIOR POSTERIOR ;  Surgeon: Bonnie Mejia MD;  [facility];  Service: Gynecology    MI COLONOSCOPY FLX DX W/[facility] WHEN PFRMD N/A 4/4/2019    Procedure: COLONOSCOPY;  Surgeon: Cristhian Reed DO;  [facility]; Service: Gastroenterology    MI CYSTOURETHROSCOPY N/A 3/17/2016    Procedure: CYSTOSCOPY;  Surgeon: Bonnie Mejia MD;  [facility];  Service: Gynecology    MI ESOPHAGOGASTRODUODENOSCOPY TRANSORAL DIAGNOSTIC N/A 4/16/2018    Procedure: EGD AND COLONOSCOPY;  Surgeon: Cristhian Reed DO;  [facility]; Service: Gastroenterology    MI LAP,SURG,COLECTOMY, PARTIAL, W/ANAST Right 1/13/2022    Procedure: Diagnostic laparoscopy, laparscopic right colectomy;  Surgeon: Ashley Sorensen MD;  [facility];  Service: Colorectal    MI REVAGINAL PROLAPSE,SACROSP LIG N/A 3/17/2016    Procedure: COLPOPEXY VAGINAL EXTRAPERITONEAL (VEC) ANTERIOR ;  Surgeon: Bonnie Mejia MD;  [facility];  Service: Gynecology    MI SLING OPER STRES INCONTINENCE N/A 3/17/2016    Procedure: INSERTION PUBOVAGINAL SLING SINGLE INCISION ;  Surgeon: Bonnie Mejai MD;  [facility];  Service: Gynecology         Subjective:  Pt awake and alert, lunch tray at bedside  Session completed in [facility] [place]  "I've been fighting hard and getting better!"    Current Diet:  Regular w/ thin     Objective:  Pt seen for dx dysphagia tx to assess PO tolerance across meal/larger sample  Labial seal is adequate for retrieval and oral containment  Mastication, bolus formation and transfer of all is functional  No signs of reduced oral control  Swallows appear timely  No overt s/s aspiration across meal  Education provided on strategies to optimize swallow safety and s/s dysphagia/aspiration to notify her medical team of should they arise  Pt demonstrated understanding and denied questions at this time  Assessment:  Oropharyngeal swallow skill appears GWFL  Plan/Recommendations:  Continue current diet  Frequent and thorough oral care  No further IP ST needs, please re-consult if medically necessary

## 2022-08-16 NOTE — CASE MANAGEMENT
Case Management Discharge Planning Note    Patient name Brook Lu  [facility] [address] [facility]N [id number]  : 1940 Date 2022       Current Admission Date: 2022  Current Admission Diagnosis:Arterial ischemic stroke, MCA (middle cerebral artery), right, acute [facility])   Patient Active Problem List    Diagnosis Date Noted    Arterial ischemic stroke, MCA (middle cerebral artery), right, acute ([facility] ) 2022    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 ([facility] ) 2022    Renal insufficiency     Abnormal nuclear stress test 2021    Diverticulosis of colon 2019    Presence of cardiac pacemaker 2018    Tubulovillous adenoma 2018    Gastroesophageal reflux disease without esophagitis 2018    Essential hypertension 2017    Hyperlipidemia LDL goal <100 2017    Stage 3 [facility] 2017    Osteoarthritis of both wrists 2017    Chronic atrial fibrillation ([facility] ) 2017    Cavus deformity of foot 2017    Hallux abducto valgus, bilateral 2017    Lipoma of right upper extremity 2017    Pain due to onychomycosis of toenails of both feet 2017    Allergic rhinitis due to pollen 10/12/2015    Midline cystocele 2014    Osteoarthritis of hip 2014    B12 deficiency 2013    Osteopenia 11/15/2013    Left renal artery stenosis ([facility] ) 2013    Left atrial enlargement 2013      LOS (days): 4  Geometric Mean LOS (GMLOS) (days):   Days to GMLOS:     OBJECTIVE:  Risk of Unplanned Readmission Score: 11 16         Current admission status: Inpatient   Preferred Pharmacy:   [facility] [place] [address]  Phone: [phone] Fax: [place] [address], [facility] [address]  Phone: [phone] Fax: [phone]    Primary Care Provider: Lu Lal MD    Primary Insurance: [place] MEDICARE [facility] REP  Secondary Insurance: Hammad Barroso [facility]    DISCHARGE DETAILS:    Discharge planning discussed with[de-identified] CM spoke with the pt and her son re: disposition planning updates     Comments - Freedom of Choice: FOC explored and [facility] is the preferred IRF provider  CM contacted family/caregiver?: Yes  Were Treatment Team discharge recommendations reviewed with patient/caregiver?: Yes  Did patient/caregiver verbalize understanding of patient care needs?: Yes  Were patient/caregiver advised of the risks associated with not following Treatment Team discharge recommendations?: Yes    Contacts  Relationship to Patient[de-identified] Family  Contact Method: In Person  Reason/Outcome: Continuity of Care, Discharge [address]         Is the patient interested in [facility] at discharge?: No    DME Referral Provided  Referral made for DME?: No    Other Referral/Resources/Interventions Provided:  Interventions: Acute Rehab    Would you like to participate in our [address] service program?  : No - Declined    Treatment Team Recommendation: Acute Rehab  Discharge Destination Plan[de-identified] Acute Rehab         Additional Comments: CM conferred with neurology service and is aware the pt is stable for hospital discharge  PMR evaluated the pt and recommend IRF placement  Referrals were forwarded to [facility] and [facility] is preferred and the pt is medically approved for admission  Insurance authorization was initiated on this date  If a bed is not available at [facility] the day authorization is obtained, preference is the pt transition to [facility]  CM will continue to follow

## 2022-08-16 NOTE — PLAN OF CARE
Problem: Prexisting or High Potential for Compromised Skin Integrity  Goal: Skin integrity is maintained or improved  Description: INTERVENTIONS:  - Identify patients at risk for skin breakdown  - Assess and monitor skin integrity  - Assess and monitor nutrition and hydration status  - Monitor labs   - Assess for incontinence   - Turn and reposition patient  - Assist with mobility/ambulation  - Relieve pressure over bony prominences  - Avoid friction and shearing  - Provide appropriate hygiene as needed including keeping skin clean and dry  - Evaluate need for skin moisturizer/barrier cream  - Collaborate with interdisciplinary team   - Patient/family teaching  - Consider wound care consult   Outcome: Progressing     Problem: MOBILITY - ADULT  Goal: Maintain or return to baseline ADL function  Description: INTERVENTIONS:  -  Assess patient's ability to carry out ADLs; assess patient's baseline for ADL function and identify physical deficits which impact ability to perform ADLs (bathing, care of mouth/teeth, toileting, grooming, dressing, etc )  - Assess/evaluate cause of self-care deficits   - Assess range of motion  - Assess patient's mobility; develop plan if impaired  - Assess patient's need for assistive devices and provide as appropriate  - Encourage maximum independence but intervene and supervise when necessary  - Involve family in performance of ADLs  - Assess for home care needs following discharge   - Consider OT consult to assist with ADL evaluation and planning for discharge  - Provide patient education as appropriate  Outcome: Progressing  Goal: Maintains/Returns to pre admission functional level  Description: INTERVENTIONS:  - Perform BMAT or MOVE assessment daily    - Set and communicate daily mobility goal to care team and patient/family/caregiver  - Collaborate with rehabilitation services on mobility goals if consulted  - Perform Range of Motion  times a day    - Reposition patient every hours   - Dangle patient  times a day  - Stand patient  times a day  - Ambulate patient  times a day  - Out of bed to chair  times a day   - Out of bed for meals  times a day  - Out of bed for toileting  - Record patient progress and toleration of activity level   Outcome: Progressing     Problem: PAIN - ADULT  Goal: Verbalizes/displays adequate comfort level or baseline comfort level  Description: Interventions:  - Encourage patient to monitor pain and request assistance  - Assess pain using appropriate pain scale  - Administer analgesics based on type and severity of pain and evaluate response  - Implement non-pharmacological measures as appropriate and evaluate response  - Consider cultural and social influences on pain and pain management  - Notify physician/advanced practitioner if interventions unsuccessful or patient reports new pain  Outcome: Progressing     Problem: INFECTION - ADULT  Goal: Absence or prevention of progression during hospitalization  Description: INTERVENTIONS:  - Assess and monitor for signs and symptoms of infection  - Monitor lab/diagnostic results  - Monitor all insertion sites, i e  indwelling lines, tubes, and drains  - Monitor endotracheal if appropriate and nasal secretions for changes in amount and color  - [place] appropriate cooling/warming therapies per order  - Administer medications as ordered  - Instruct and encourage patient and family to use good hand hygiene technique  - Identify and instruct in appropriate isolation precautions for identified infection/condition  Outcome: Progressing  Goal: Absence of fever/infection during neutropenic period  Description: INTERVENTIONS:  - Monitor WBC    Outcome: Progressing     Problem: SAFETY ADULT  Goal: Maintain or return to baseline ADL function  Description: INTERVENTIONS:  -  Assess patient's ability to carry out ADLs; assess patient's baseline for ADL function and identify physical deficits which impact ability to perform ADLs (bathing, care of mouth/teeth, toileting, grooming, dressing, etc )  - Assess/evaluate cause of self-care deficits   - Assess range of motion  - Assess patient's mobility; develop plan if impaired  - Assess patient's need for assistive devices and provide as appropriate  - Encourage maximum independence but intervene and supervise when necessary  - Involve family in performance of ADLs  - Assess for home care needs following discharge   - Consider OT consult to assist with ADL evaluation and planning for discharge  - Provide patient education as appropriate  Outcome: Progressing  Goal: Maintains/Returns to pre admission functional level  Description: INTERVENTIONS:  - Perform BMAT or MOVE assessment daily    - Set and communicate daily mobility goal to care team and patient/family/caregiver  - Collaborate with rehabilitation services on mobility goals if consulted  - Perform Range of Motion  times a day  - Reposition patient every  hours    - Dangle patient  times a day  - Stand patient  times a day  - Ambulate patient  times a day  - Out of bed to chair  times a day   - Out of bed for meals  times a day  - Out of bed for toileting  - Record patient progress and toleration of activity level   Outcome: Progressing  Goal: Patient will remain free of falls  Description: INTERVENTIONS:  - Educate patient/family on patient safety including physical limitations  - Instruct patient to call for assistance with activity   - Consult OT/PT to assist with strengthening/mobility   - Keep Call bell within reach  - Keep bed low and locked with side rails adjusted as appropriate  - Keep care items and personal belongings within reach  - Initiate and maintain comfort rounds  - Make Fall Risk Sign visible to staff  - Offer Toileting every  Hours, in advance of need  - Initiate/Maintain alarm  - Obtain necessary fall risk management equipment:   - Apply yellow socks and bracelet for high fall risk patients  - Consider moving patient to room near nurses station  Outcome: Progressing     Problem: DISCHARGE PLANNING  Goal: Discharge to home or other facility with appropriate resources  Description: INTERVENTIONS:  - Identify barriers to discharge w/patient and caregiver  - Arrange for needed discharge resources and transportation as appropriate  - Identify discharge learning needs (meds, wound care, etc )  - Arrange for interpretive services to assist at discharge as needed  - Refer to Case Management Department for coordinating discharge planning if the patient needs post-hospital services based on physician/advanced practitioner order or complex needs related to functional status, cognitive ability, or social support system  Outcome: Progressing     Problem: Knowledge Deficit  Goal: Patient/family/caregiver demonstrates understanding of disease process, treatment plan, medications, and discharge instructions  Description: Complete learning assessment and assess knowledge base    Interventions:  - Provide teaching at level of understanding  - Provide teaching via preferred learning methods  Outcome: Progressing     Problem: Potential for Falls  Goal: Patient will remain free of falls  Description: INTERVENTIONS:  - Educate patient/family on patient safety including physical limitations  - Instruct patient to call for assistance with activity   - Consult OT/PT to assist with strengthening/mobility   - Keep Call bell within reach  - Keep bed low and locked with side rails adjusted as appropriate  - Keep care items and personal belongings within reach  - Initiate and maintain comfort rounds  - Make Fall Risk Sign visible to staff  - Offer Toileting every  Hours, in advance of need  - Initiate/Maintain alarm  - Obtain necessary fall risk management equipment:   - Apply yellow socks and bracelet for high fall risk patients  - Consider moving patient to room near nurses station  Outcome: Progressing

## 2022-08-17 LAB
ANION GAP SERPL CALCULATED.3IONS-SCNC: 7 MMOL/L (ref 4–13)
BUN SERPL-MCNC: 28 MG/DL (ref 5–25)
CALCIUM SERPL-MCNC: 8.8 MG/DL (ref 8.3–10.1)
CHLORIDE SERPL-SCNC: 106 MMOL/L (ref 96–108)
CO2 SERPL-SCNC: 22 MMOL/L (ref 21–32)
CREAT SERPL-MCNC: 1.05 MG/DL (ref 0.6–1.3)
ERYTHROCYTE [DISTWIDTH] IN BLOOD BY AUTOMATED COUNT: 14.4 % (ref 11.6–15.1)
GFR SERPL CREATININE-BSD FRML MDRD: 49 ML/MIN/1.73SQ M
GLUCOSE SERPL-MCNC: 105 MG/DL (ref 65–140)
HCT VFR BLD AUTO: 36.8 % (ref 34.8–46.1)
HGB BLD-MCNC: 11.8 G/DL (ref 11.5–15.4)
MCH RBC QN AUTO: 29.9 PG (ref 26.8–34.3)
MCHC RBC AUTO-ENTMCNC: 32.1 G/DL (ref 31.4–37.4)
MCV RBC AUTO: 93 FL (ref 82–98)
PLATELET # BLD AUTO: 323 THOUSANDS/UL (ref 149–390)
PMV BLD AUTO: 10.7 FL (ref 8.9–12.7)
POTASSIUM SERPL-SCNC: 4.3 MMOL/L (ref 3.5–5.3)
RBC # BLD AUTO: 3.95 MILLION/UL (ref 3.81–5.12)
SODIUM SERPL-SCNC: 135 MMOL/L (ref 135–147)
WBC # BLD AUTO: 9.8 THOUSAND/UL (ref 4.31–10.16)

## 2022-08-17 PROCEDURE — 97110 THERAPEUTIC EXERCISES: CPT

## 2022-08-17 PROCEDURE — 85027 COMPLETE CBC AUTOMATED: CPT

## 2022-08-17 PROCEDURE — 80048 BASIC METABOLIC PNL TOTAL CA: CPT

## 2022-08-17 PROCEDURE — 99232 SBSQ HOSP IP/OBS MODERATE 35: CPT | Performed by: PSYCHIATRY & NEUROLOGY

## 2022-08-17 PROCEDURE — 97535 SELF CARE MNGMENT TRAINING: CPT

## 2022-08-17 PROCEDURE — 97112 NEUROMUSCULAR REEDUCATION: CPT

## 2022-08-17 PROCEDURE — 97530 THERAPEUTIC ACTIVITIES: CPT

## 2022-08-17 RX ADMIN — MAGNESIUM OXIDE TAB 400 MG (241.3 MG ELEMENTAL MG) 400 MG: 400 (241.3 MG) TAB at 08:36

## 2022-08-17 RX ADMIN — FUROSEMIDE 20 MG: 20 TABLET ORAL at 08:36

## 2022-08-17 RX ADMIN — DABIGATRAN ETEXILATE MESYLATE 150 MG: 150 CAPSULE ORAL at 21:09

## 2022-08-17 RX ADMIN — LOSARTAN POTASSIUM 100 MG: 50 TABLET, FILM COATED ORAL at 08:36

## 2022-08-17 RX ADMIN — NEBIVOLOL 5 MG: 5 TABLET ORAL at 08:39

## 2022-08-17 RX ADMIN — DABIGATRAN ETEXILATE MESYLATE 150 MG: 150 CAPSULE ORAL at 08:36

## 2022-08-17 RX ADMIN — PRAVASTATIN SODIUM 40 MG: 40 TABLET ORAL at 18:11

## 2022-08-17 RX ADMIN — VERAPAMIL HYDROCHLORIDE 240 MG: 240 TABLET ORAL at 21:12

## 2022-08-17 RX ADMIN — SPIRONOLACTONE 25 MG: 25 TABLET ORAL at 08:36

## 2022-08-17 NOTE — PROGRESS NOTES
NEUROLOGY RESIDENCY PROGRESS NOTE     Name: Jerry Lockwood   Age & Sex: 80 y o  female   MRN: [id number]  Unit/Bed#: [facility] 5-12   Encounter: [id number]    Deon March need follow up in in 6 weeks with neurovascular Attending/AP/Resident      Pending for discharge: pending auth for placement    ASSESSMENT & PLAN     * Arterial ischemic stroke, MCA (middle cerebral artery), right, acute [facility])  Assessment & Plan  80 y o  F presented to [facility] on 8/12/2022 as a stroke alert  Presenting deficits: contracted and weak LUE, flaccid LLE, left facial droop, mild dysarthria, and mild sensory deficits on the left side  Upon arrival to the ED, /80 on arrival   NIHSS 11  CTA h/n revealed near occlusive thormbus at R MCA bifurcation  Not a TNK candidate due to Xarelto use the night prior; not a thrombectomy candidate per Neurosurgery  Was started on stroke protocol heparin gtt and admitted on the stroke pathway  Pertinent PMHx: Afib on Xarelto, sick sinus syndrome s/p pacemaker (not MRI conditional), HTN, and HLD      [place]:    · Lipid Panel: Cholesterol 100, LDL 38   · Hemoglobin A1c 5 4  · TTE: Left Atrium: moderately dilated  · CTH: focal area of gliosis within the high right postcentral gyrus and scattered chronic microvascular ischemic changes with more focal lucency along the anterior limb of the right internal capsule, but no acute hemorrhage  · CTA h/n: near occlusive thrombus at the right MCA bifurcation, mild beading of the mid to distal ICAs suspicious for mild fibromuscular dysplasia, and mild atherosclerotic disease at bilateral distal carotid arteries      · CTP: 8 mL mismatch  · Repeat [facility] 8/12 revealed subtle loss of gray-white matter differentiation in the right temporal lobe and insular cortex in keeping with right MCA territory infarction    - Unable to get MRI as pacemaker is not compatible   Exam: brisk reflexes in LUE, spasticity in LUE, LLE, hemiplegia in LUE, LLE     Impression: Etiology remains unclear, but most likely due to Xarelto failure in the setting of known afib  No known cancer or hypercoagulable history  Plan:  - Stroke pathway  - on Pradaxa 150 mg BID for AC (was on xarelto previously)  - Previously on Crestor 10 mg, recommend to decrease to Crestor 5 mg (LDL 38)   - changed to alternative pravastatin 40mg equivalent to crestor 5mg  - BP goals: normotension; cautiously restarted home meds  - Euglycemic, normothermic goal  - Continue telemetry  - PT/OT/ST recommended inpt rehab and family prefers [facility] and CM working on auth/placement    - pending PMR eval  - Stroke education  - Frequent neuro checks  Continue to monitor and notify neurology with any changes   - STAT CT head for any acute change in neuro exam    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 [facility])  Assessment & Plan  - CTA head/neck - near occlusive thrombus at the R MCA bifurcation, mild beading of the mid to distal ICAs suspicious for mild fibromuscular dysplasia, and mild atherosclerotic disease at bilateral distal carotid arteries  -  Switch AC to Pradaxa on 8/15  - will likely need CTA h/n in 3 months to re-evaluate thrombus     Chronic atrial fibrillation (HCC)  Assessment & Plan  · On Xarelto 15 mg daily QHS  No missed doses  Last dose on 8/11 before going to bed  · Was on stroke protocol heparin gtt (PTT goal 50-70)  · Start Pradaxa 8/15    Essential hypertension  Assessment & Plan  · Normotension; restarted home meds (Verapamil 240 mg QHS, Spirono 25 mg QD, Cozaar 100 mg QD, Lasix 20 mg QD)   · Close hemodynamic monitoring  · Low threshold to hold off Verapamil at night time if BP during the day drops significantly     Hyperlipidemia LDL goal <100  Assessment & Plan  · Upon DC, recommend Crestor 5 mg daily (previously on 10 mg at home)   · on pravastatin 40 mg equivalent to Crestor 5 mg      SUBJECTIVE     Patient was seen and examined  No acute events overnight  She had no acute concerns and is wanting to go to [facility] for rehab  She mentioned she was trying to urinate earlier and had trouble actually going  She understands she will be put on urinary retention protocol  She denies any fevers/chill, chest pain, shortness of breath, abdominal pain, nausea, vomiting, diarrhea, problems urinating, problems  With bowel movements, and is eating/drinking without problem     She denies any headaches, syncope, paresthesias, diplopia, visual changes, tinnitus, worsening weakness, garbled speech, dysarthria/slurring of words,  Loss of bowel or bladder  ROS- see above  OBJECTIVE     Patient ID: Wisam Tavera is a 80 y o  female  Vitals:    22 2132 22 0544 22 0722 22 0838   BP: 142/64  125/55 125/58   Pulse: 68  60 60   Resp:       Temp: 98 1 °F (36 7 °C)  98 5 °F (36 9 °C)    TempSrc:       SpO2: 94%  (!) 89% 94%   Weight:  61 9 kg (136 lb 7 4 oz)     Height:          Temperature:   Temp (24hrs), Av 3 °F (36 8 °C), Min:98 1 °F (36 7 °C), Max:98 5 °F (36 9 °C)    Temperature: 98 5 °F (36 9 °C)    Physical exam:  Vitals reviewed  General Examination: No distress, cooperative  CVS: S1, S2 noted       Neurological exam:     Mental Status  A, Ox3  Follows simple, 3 step commands  Speech: fluent, normal rhythm, no dysarthria       Cranial Nerves  CN II: Visual fields full to confrontation  CN III, IV, VI: EOMI bilaterally  Normal lids and orbits bilaterally  Pupils equal round and reactive to light bilaterally  No nystagmus  CN V: Facial sensation is normal   CN VII:  Right: There is no facial weakness or asymmetry  Left: There is no facial weakness or asymmetry  CN VIII: Audition intact to finger rub bilaterally  CN IX, X: Uvula midline   Palate elevates symmetrically  CN XI:  Right: Sternocleidomastoid strength is normal  Trapezius strength is normal   Left: Sternocleidomastoid strength is normal  Trapezius strength is normal   CN XII: Tongue midline without atrophy or fasciculations with appropriate movement      Motor  Spasticity, hemiplegia in left upper, lower extremities  5/5 strength in right upper, lower extremities throughout      Sensory   RUE, RLE: light touch, pinprick sensation preserved  LUE, LLE: decreased light touch, pinprick sensations      Reflexes Right Left   Brachioradialis     +2  +3   Biceps                                   +2  +3   Triceps  +2  +3   Patellar   +3  +3      Ankle clonus absent b/l  Plantars mute bilaterally      Coordination  Finger-to-nose-normal on the right       Gait  Deferred    LABORATORY DATA     Labs: I have personally reviewed pertinent reports  and I have personally reviewed pertinent films in PACS  Results from last 7 days   Lab Units 08/17/22  0515 08/16/22  0457 08/15/22  0500 08/13/22  0555   WBC Thousand/uL 9 80 6 97 9 91 8 00   HEMOGLOBIN g/dL 11 8 12 0 11 9 10 2*   HEMATOCRIT % 36 8 37 8 38 0 31 4*   PLATELETS Thousands/uL 323 302 243 199   NEUTROS PCT %  --   --   --  73   MONOS PCT %  --   --   --  10      Results from last 7 days   Lab Units 08/17/22  0515 08/16/22  0457 08/15/22  0500   SODIUM mmol/L 135 137 136   POTASSIUM mmol/L 4 3 3 7 4 1   CHLORIDE mmol/L 106 108 110*   CO2 mmol/L 22 22 20*   BUN mg/dL 28* 19 15   CREATININE mg/dL 1 05 0 99 0 87   CALCIUM mg/dL 8 8 9 0 8 8     Results from last 7 days   Lab Units 08/13/22  0555   MAGNESIUM mg/dL 1 8     Results from last 7 days   Lab Units 08/13/22  0555   PHOSPHORUS mg/dL 2 9      Results from last 7 days   Lab Units 08/15/22  0905 08/14/22  2032 08/14/22  0746 08/12/22  2153 08/12/22  1702 08/12/22  0940   INR   --   --   --   --  1 57* 1 86*   PTT seconds 64* 59* 68*   < > 50*  57* 29    < > = values in this interval not displayed  IMAGING & DIAGNOSTIC TESTING     Radiology Results: I have personally reviewed pertinent reports     and I have personally reviewed pertinent films in PACS    CTA head and neck w wo contrast   Final Result by Eileen Oconnell MD (08/15 1253)      Evolving right MCA distribution infarct  No evidence for secondary parenchymal hematoma  Interval recanalization of previously seen thrombus at the right MCA bifurcation  Middle cerebral arteries are patent on the current exam without evidence for major branch vessel occlusion  Stable beading of the mid to distal internal carotid arteries again suggestive of mild fibromuscular dysplasia  Workstation performed: [id number]         CT head wo contrast   Final Result by Favian Ellington MD (08/13 1301)      Interval development of subtle loss of gray-white matter differentiation in the right temporal lobe and insular cortex in keeping with right MCA territory infarction (series 2 images 11-14 )  There is no hemorrhagic conversion  Workstation performed: [id number]         CT cerebral perfusion   Final Result by Eileen Oconnell MD (08/12 1108)      CT perfusion performed  Data available on PACS  Workstation performed: [id number]         CTA stroke alert (head/neck)   Final Result by Eileen Oconnell MD (08/12 1032)      Near occlusive thrombus at the right MCA bifurcation  Mild beading of the mid to distal internal carotid arteries suspicious for mild fibromuscular dysplasia  Atherosclerotic plaque at the carotid bulbs without evidence for high-grade stenosis or focal occlusion of the cervical vasculature  Partially imaged pulmonary edema and layering bilateral pleural effusions  Findings were directly discussed with Gia Kaur at 10:00 AM                         Workstation performed: [id number]         CT stroke alert brain   Final Result by Eileen Oconnell MD (08/12 1021)      No definite CT evidence for large acute vascular distribution infarct or acute intracranial hemorrhage        Findings were directly discussed with Gia Kaur at 10:00 AM          Workstation performed: [id number] Other Diagnostic Testing: I have personally reviewed pertinent reports  and I have personally reviewed pertinent films in PACS    ACTIVE MEDICATIONS     Current Facility-Administered Medications   Medication Dose Route Frequency    acetaminophen (TYLENOL) tablet 650 mg  650 mg Oral Q6H PRN    dabigatran etexilate (PRADAXA) capsule 150 mg  150 mg Oral Q12H [address]    furosemide (LASIX) tablet 20 mg  20 mg Oral Daily    losartan (COZAAR) tablet 100 mg  100 mg Oral Daily    magnesium oxide (MAG-OX) tablet 400 mg  400 mg Oral Daily    nebivolol (BYSTOLIC) tablet 5 mg  5 mg Oral Daily    pravastatin (PRAVACHOL) tablet 40 mg  40 mg Oral Daily With Dinner    spironolactone (ALDACTONE) tablet 25 mg  25 mg Oral Daily    verapamil (CALAN-SR) CR tablet 240 mg  240 mg Oral HS       Prior to Admission medications    Medication Sig Start Date End Date Taking?  Authorizing Provider   apixaban (Eliquis) 5 mg Take 1 tablet (5 mg total) by mouth 2 (two) times a day 8/14/22 9/13/22 Yes Twyla Vasquez 89, DO   dabigatran etexilate (PRADAXA) 150 mg capsu Take 1 capsule (150 mg total) by mouth 2 (two) times a day PRICE CHECK DO NOT FILL 8/14/22 9/13/22 Yes Twyla Vasquez 96, DO   Blood Pressure Monitoring (Blood Pressure Cuff) MISC Use every other day For HTN 4/28/21   Antonio FriendGATITO   cyanocobalamin 1,000 mcg/mL INJECT 1 ML (1,000 MCG TOTAL) INTO A MUSCLE EVERY 30 (THIRTY) DAYS 1/7/22   Wellington Katz MD   furosemide (LASIX) 20 mg tablet Take 1 tablet (20 mg total) by mouth daily 5/3/22   Alfa Anaya MD   losartan (COZAAR) 100 MG tablet Take 1 tablet (100 mg total) by mouth daily 3/23/21   Alfonso Salazar MD   magnesium oxide (MAG-OX) 400 mg TAKE 1 TABLET (400 MG TOTAL) BY MOUTH DAILY 1/26/22   Daily Jolley,    nebivolol (BYSTOLIC) 5 mg tablet Take 1 tablet (5 mg total) by mouth daily 12/14/21   Alfa Anaya MD   polyvinyl alcohol (LIQUIFILM TEARS) 1 4 % ophthalmic solution Administer 1 drop to the right eye as needed for dry eyes 12/13/21   Marquis Verdugo DO   potassium chloride (MICRO-K) 10 MEQ CR capsule Take 1 capsule (10 mEq total) by mouth daily 5/3/22   Leslee Fleming MD   rivaroxaban (Xarelto) 15 mg tablet Take 1 tablet (15 mg total) by mouth daily at bedtime 1/18/22   Carlos Parekh PA-C   rosuvastatin (CRESTOR) 10 MG tablet TAKE 1 TABLET (10 MG TOTAL) BY MOUTH DAILY 6/2/22   Jacobo Mccracken MD   spironolactone (ALDACTONE) 25 mg tablet Take 25 mg by mouth daily 3/28/22   Historical Provider, MD   triamcinolone (KENALOG) 0 1 % cream Apply topically 2 (two) times a day Apply to left arm topically 2 times daily for 1 week 5/2/22   Altagracia Tripp MD   verapamil (CALAN-SR) 240 mg CR tablet TAKE 1 TABLET (240 MG TOTAL) BY MOUTH DAILY AT BEDTIME 7/26/22   Altagracia Tripp MD         VTE Pharmacologic Prophylaxis: Dabigatran (Pradaxa)  VTE Mechanical Prophylaxis: sequential compression device    ==  MD Em Curtis [facility]'s Neurology Residency, PGY-II

## 2022-08-17 NOTE — PLAN OF CARE
Problem: Prexisting or High Potential for Compromised Skin Integrity  Goal: Skin integrity is maintained or improved  Description: INTERVENTIONS:  - Identify patients at risk for skin breakdown  - Assess and monitor skin integrity  - Assess and monitor nutrition and hydration status  - Monitor labs   - Assess for incontinence   - Turn and reposition patient  - Assist with mobility/ambulation  - Relieve pressure over bony prominences  - Avoid friction and shearing  - Provide appropriate hygiene as needed including keeping skin clean and dry  - Evaluate need for skin moisturizer/barrier cream  - Collaborate with interdisciplinary team   - Patient/family teaching  - Consider wound care consult   Outcome: Progressing     Problem: MOBILITY - ADULT  Goal: Maintain or return to baseline ADL function  Description: INTERVENTIONS:  -  Assess patient's ability to carry out ADLs; assess patient's baseline for ADL function and identify physical deficits which impact ability to perform ADLs (bathing, care of mouth/teeth, toileting, grooming, dressing, etc )  - Assess/evaluate cause of self-care deficits   - Assess range of motion  - Assess patient's mobility; develop plan if impaired  - Assess patient's need for assistive devices and provide as appropriate  - Encourage maximum independence but intervene and supervise when necessary  - Involve family in performance of ADLs  - Assess for home care needs following discharge   - Consider OT consult to assist with ADL evaluation and planning for discharge  - Provide patient education as appropriate  Outcome: Progressing  Goal: Maintains/Returns to pre admission functional level  Description: INTERVENTIONS:  - Perform BMAT or MOVE assessment daily    - Set and communicate daily mobility goal to care team and patient/family/caregiver  - Collaborate with rehabilitation services on mobility goals if consulted  - Perform Range of Motion 3 times a day    - Reposition patient every 2 hours   - Dangle patient 3 times a day  - Stand patient 3 times a day  - Ambulate patient 3 times a day  - Out of bed to chair 3 times a day   - Out of bed for meals 3 times a day  - Out of bed for toileting  - Record patient progress and toleration of activity level   Outcome: Progressing     Problem: PAIN - ADULT  Goal: Verbalizes/displays adequate comfort level or baseline comfort level  Description: Interventions:  - Encourage patient to monitor pain and request assistance  - Assess pain using appropriate pain scale  - Administer analgesics based on type and severity of pain and evaluate response  - Implement non-pharmacological measures as appropriate and evaluate response  - Consider cultural and social influences on pain and pain management  - Notify physician/advanced practitioner if interventions unsuccessful or patient reports new pain  Outcome: Progressing     Problem: INFECTION - ADULT  Goal: Absence or prevention of progression during hospitalization  Description: INTERVENTIONS:  - Assess and monitor for signs and symptoms of infection  - Monitor lab/diagnostic results  - Monitor all insertion sites, i e  indwelling lines, tubes, and drains  - Monitor endotracheal if appropriate and nasal secretions for changes in amount and color  - [place] appropriate cooling/warming therapies per order  - Administer medications as ordered  - Instruct and encourage patient and family to use good hand hygiene technique  - Identify and instruct in appropriate isolation precautions for identified infection/condition  Outcome: Progressing  Goal: Absence of fever/infection during neutropenic period  Description: INTERVENTIONS:  - Monitor WBC    Outcome: Progressing     Problem: SAFETY ADULT  Goal: Maintain or return to baseline ADL function  Description: INTERVENTIONS:  -  Assess patient's ability to carry out ADLs; assess patient's baseline for ADL function and identify physical deficits which impact ability to perform ADLs (bathing, care of mouth/teeth, toileting, grooming, dressing, etc )  - Assess/evaluate cause of self-care deficits   - Assess range of motion  - Assess patient's mobility; develop plan if impaired  - Assess patient's need for assistive devices and provide as appropriate  - Encourage maximum independence but intervene and supervise when necessary  - Involve family in performance of ADLs  - Assess for home care needs following discharge   - Consider OT consult to assist with ADL evaluation and planning for discharge  - Provide patient education as appropriate  Outcome: Progressing  Goal: Maintains/Returns to pre admission functional level  Description: INTERVENTIONS:  - Perform BMAT or MOVE assessment daily    - Set and communicate daily mobility goal to care team and patient/family/caregiver  - Collaborate with rehabilitation services on mobility goals if consulted  - Perform Range of Motion 3 times a day  - Reposition patient every 2 hours    - Dangle patient 3 times a day  - Stand patient 3 times a day  - Ambulate patient 3 times a day  - Out of bed to chair 3 times a day   - Out of bed for meals 3 times a day  - Out of bed for toileting  - Record patient progress and toleration of activity level   Outcome: Progressing  Goal: Patient will remain free of falls  Description: INTERVENTIONS:  - Educate patient/family on patient safety including physical limitations  - Instruct patient to call for assistance with activity   - Consult OT/PT to assist with strengthening/mobility   - Keep Call bell within reach  - Keep bed low and locked with side rails adjusted as appropriate  - Keep care items and personal belongings within reach  - Initiate and maintain comfort rounds  - Make Fall Risk Sign visible to staff  - Apply yellow socks and bracelet for high fall risk patients  - Consider moving patient to room near nurses station  Outcome: Progressing     Problem: DISCHARGE PLANNING  Goal: Discharge to home or other facility with appropriate resources  Description: INTERVENTIONS:  - Identify barriers to discharge w/patient and caregiver  - Arrange for needed discharge resources and transportation as appropriate  - Identify discharge learning needs (meds, wound care, etc )  - Arrange for interpretive services to assist at discharge as needed  - Refer to Case Management Department for coordinating discharge planning if the patient needs post-hospital services based on physician/advanced practitioner order or complex needs related to functional status, cognitive ability, or social support system  Outcome: Progressing     Problem: Knowledge Deficit  Goal: Patient/family/caregiver demonstrates understanding of disease process, treatment plan, medications, and discharge instructions  Description: Complete learning assessment and assess knowledge base    Interventions:  - Provide teaching at level of understanding  - Provide teaching via preferred learning methods  Outcome: Progressing     Problem: Potential for Falls  Goal: Patient will remain free of falls  Description: INTERVENTIONS:  - Educate patient/family on patient safety including physical limitations  - Instruct patient to call for assistance with activity   - Consult OT/PT to assist with strengthening/mobility   - Keep Call bell within reach  - Keep bed low and locked with side rails adjusted as appropriate  - Keep care items and personal belongings within reach  - Initiate and maintain comfort rounds  - Make Fall Risk Sign visible to staff  - Apply yellow socks and bracelet for high fall risk patients  - Consider moving patient to room near nurses station  Outcome: Progressing

## 2022-08-17 NOTE — PLAN OF CARE
Problem: OCCUPATIONAL THERAPY ADULT  Goal: Performs self-care activities at highest level of function for planned discharge setting  See evaluation for individualized goals  Description: Treatment Interventions: ADL retraining, Functional transfer training, UE strengthening/ROM, Patient/family training, Equipment evaluation/education, Compensatory technique education, Energy conservation, Activityengagement, Endurance training, Cognitive reorientation, Fine motor coordination activities          See flowsheet documentation for full assessment, interventions and recommendations  Note: Limitation: Decreased ADL status, Decreased UE ROM, Decreased UE strength, Decreased Safe judgement during ADL, Decreased self-care trans, Decreased high-level ADLs, Non-func L UE, Decreased cognition, Decreased endurance, Decreased fine motor control  Prognosis: Fair  Assessment: Patient participated in Skilled OT session this date with interventions consisting of self care tasks at EOB, left UE therapeutic exercises with PROM to elbow/shoulder and  strengthening exercises, functional transfers including commode transfer   Patient agreeable to OT treatment session, upon arrival patient was found supine in bed  In comparison to previous session, patient with improvements in self care tasks   Patient requiring verbal cues for safety, verbal cues for correct technique and frequent rest periods  Patient continues to be functioning below baseline level, occupational performance remains limited secondary to factors listed above and increased risk for falls and injury  The patient's raw score on the AM-PAC Daily Activity inpatient short form is 14, standardized score is 33 39, less than 39 4  Patients at this level are likely to benefit from discharge to post-acute rehabilitation services  Please refer to the recommendation of the Occupational Therapist for safe discharge planning     From OT standpoint, recommendation at time of d/c would be Short Term Rehab  Patient to benefit from continued Occupational Therapy treatment while in the hospital to address deficits as defined above and maximize level of functional independence with ADLs and functional mobility       OT Discharge Recommendation: Post acute rehabilitation services

## 2022-08-17 NOTE — RESTORATIVE TECHNICIAN NOTE
Restorative Technician Note      Patient Name: Nas Brown     Note Type: Mobility  Patient Position Upon Consult: Bedside chair  Activity Performed: Transferred; Dangled; Stood  Assistive Device: Other (Comment) (Assist x2 back to bed stand-pivot )  Education Provided: Yes  Patient Position at End of Consult: Supine;  All needs within reach; Bed/Chair alarm activated    Orval Or BS, Restorative Technician, [employer]

## 2022-08-17 NOTE — PHYSICAL THERAPY NOTE
PHYSICAL THERAPY NOTE          Patient Name: Errol Storm  UMDTG'T Date: 8/17/2022 08/17/22 1121   PT Last Visit   PT Visit Date 08/17/22   Note Type   Note Type Treatment   Pain Assessment   Pain Assessment Tool 0-10   Pain Score No Pain   Restrictions/Precautions   Weight Bearing Precautions Per Order No   Other Precautions Chair Alarm; Bed Alarm;Multiple lines;Telemetry; Fall Risk  (L hemiparesis, increased tone LUE and LE, LUE flexion tone)   General   Chart Reviewed Yes   Response to Previous Treatment Patient with no complaints from previous session  Family/Caregiver Present No   Cognition   Overall Cognitive Status WFL   Arousal/Participation Cooperative   Attention Attends with cues to redirect   Orientation Level Oriented X4   Memory Within functional limits   Following Commands Follows one step commands without difficulty   Comments very pleasant to work with   Subjective   Subjective agreeable to participate   Bed Mobility   Supine to Sit 3  Moderate assistance   Additional items Assist x 1;HOB elevated; Increased time required;Verbal cues;LE management   Sit to Supine Unable to assess   Additional Comments remained seated in chair with alarm upon conclusion   Transfers   Sit to Stand 3  Moderate assistance   Additional items Assist x 1; Increased time required;Verbal cues   Stand to Sit 3  Moderate assistance   Additional items Assist x 1; Increased time required;Verbal cues   Stand pivot 3  Moderate assistance   Additional items Assist x 2; Increased time required;Verbal cues  (x2 trials to R  R HHA and 2nd therapist around waist )   Additional Comments (S)  STS transfers performed with therapist anterior and L knee block  x5 STS throughout  L arm held in fexion by pt     Balance   Static Sitting Fair -   Dynamic Sitting Poor +   Static Standing Poor   Dynamic Standing Poor -   Endurance Deficit   Endurance Deficit Yes   Endurance Deficit Description L hemiparesis, fatigue   Activity Tolerance   Activity Tolerance Patient limited by fatigue   Medical Staff Made Aware co-tx with OT, Kacey 2* medical complexity and decreased activity tolerance   Exercises   Knee AROM Long Arc Quad Sitting;10 reps;AAROM; Left  (tapping to distal quad)   Marching Sitting;10 reps;PROM; Left   Neuro re-ed standing at R HR with modAx1: lateral WS 2x10 reps  with modAx2 (1 at trunk, 1 at LLE), fwd/bwd step x1 rep with LLE   Balance training  static/dynamic sitting EOB with S/minAx1 respectfully x12 min  Assessment   Prognosis Fair   Problem List Decreased strength;Decreased endurance; Impaired balance;Decreased mobility; Impaired tone; Impaired sensation   Assessment Pt agreeable to participate in PT session  Pt performed functional mobility and therex as outlined above  Continues to demonstrate 0/5 LLE strength with MMT however able to active quads and glutes in static standing with VC/TC  Progressed to stand pivot bed>commode>chair  Pt taking step with RLE during stand pivot however unable to move LLE 2* decreased strength  Trails at HR performing lateral WS to  Promote L WB/pregait activities  modAx2 (1 at trunk for stability and 1 at LLE for foot advancement and placement)  Pt left seated in chair with chair alarm, call bell, phone, and all personal needs within reach  Pt will continue to benefit from skilled acute care PT to further address their functional mobility limitations  D/C recommendations remain rehab  Barriers to Discharge Inaccessible home environment;Decreased caregiver support   Goals   Patient Goals to improve   STG Expiration Date 08/27/22   PT Treatment Day 2   Plan   Treatment/Interventions Functional transfer training;LE strengthening/ROM; Therapeutic exercise; Endurance training;Patient/family training;Bed mobility;Gait training;Equipment eval/education;Spoke to nursing;Spoke to case management;OT   Progress Progressing toward goals   PT Frequency Other (Comment)  (3-6x/wk)   Recommendation   PT Discharge Recommendation Post acute rehabilitation services   AM-PAC Basic Mobility Inpatient   Turning in Bed Without Bedrails 2   Lying on Back to Sitting on Edge of Flat Bed 2   Moving Bed to Chair 1   Standing Up From Chair 2   Walk in Room 1   Climb 3-5 Stairs 1   Basic Mobility Inpatient Raw Score 9   Turning Head Towards Sound 4   Follow Simple Instructions 3   Low Function Basic Mobility Raw Score 16   Low Function Basic Mobility Standardized Score 25 72   Highest Level Of Mobility   JH-HLM Goal 3: Sit at edge of bed   JH-HLM Achieved 4: Move to chair/commode   Yang Grande, PT, DPT

## 2022-08-17 NOTE — ARC ADMISSION
Received call from [address] [place] [address]   requesting updated PT/OT notes and initial PT/OT evaluations which were included in initial fax  Last OT notes did not have full ADL's  Faxed initial PT/OT evaluations through [employer]  Provided update with same to [address] through [facility]  Will try adding patient to therapy priority list for today  Will depend on therapy availability if they are able to see patient today with current scheduled patients

## 2022-08-17 NOTE — OCCUPATIONAL THERAPY NOTE
Occupational Therapy Progress Note     Patient Name: Jelena CASTLE Date: 8/17/2022  Problem List  Principal Problem:    Arterial ischemic stroke, MCA (middle cerebral artery), right, acute ([facility] )  Active Problems:    Essential hypertension    Hyperlipidemia LDL goal <100    [facility] )    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 ([facility] )        08/17/22 0900   OT Last Visit   OT Visit Date 08/17/22   Note Type   Note Type Treatment   Restrictions/Precautions   Weight Bearing Precautions Per Order No   Other Precautions Telemetry; Fall Risk;Cognitive; Chair Alarm; Bed Alarm  (+left hemiparesis, +left glenohumeral subluxation)   Lifestyle   Autonomy I with ADLs and functional mobility without AD  Reciprocal Relationships Supportive son   Service to Others Retired   [address] Enjoys reading and watching tv   Pain Assessment   Pain Assessment Tool 0-10   Pain Score No Pain   ADL   Where Assessed Edge of bed  (pt performed EOB sitting for approx~10-12 minutes with CG static sitting, min a dynamic sitting balance with posterior lean and cues for spatial awareness to correct to midline sitting )   Grooming Assistance 5  Supervision/Setup   Grooming Deficit Wash/dry face   UB Bathing Assistance 4  Minimal Assistance   UB Bathing Deficit Left arm   LB Bathing Assistance 2  Maximal Assistance   LB Bathing Deficit Right lower leg including foot; Left lower leg including foot; Buttocks; Perineal area   UB Dressing Assistance 3  Moderate Assistance   UB Dressing Deficit Thread LUE;Pull around back  (to don hospital gown)   LB Dressing Assistance 2  Maximal Assistance   LB Dressing Deficit Don/doff R sock; Don/doff L sock   Toileting Assistance  3  Moderate Assistance   Toileting Deficit Perineal care   Bed Mobility   Supine to Sit 3  Moderate assistance   Additional items Assist x 1;HOB elevated; Increased time required;Verbal cues   Sit to Supine Unable to assess Additional Comments pt in chair at end of session with all needs met and alarm engaged   Transfers   Sit to Stand 3  Moderate assistance   Additional items Assist x 1   Stand to Sit 3  Moderate assistance   Stand pivot 3  Moderate assistance   Additional items Assist x 2  (bed>commode with B/L HHA +left hemiparetic arm supported with transfer)   Additional Comments Multiple sit to stand transfers with mod a x 1 and SBA of another for safety wtih left hemparetic arm supported   Functional Mobility   Additional Comments unable to assess   Therapeutic Exercise - ROM   UE-ROM Yes   ROM- Right Upper Extremities   R Shoulder PROM; External rotation; Internal rotation; Flexion  (flexion ot 60*)   R Elbow Prolonged stretch;Elbow flexion;PROM  (tone to elbow)   R Hand Ring finger;AROM; Thumb; Index finger; Long finger;Little finger  (a)   RUE ROM Comment 1/2 width glenohumeral subluxation, high tone to elbow only, scapula gliding with shoulder IR/ER exercises  Cognition   Arousal/Participation Alert; Responsive; Cooperative   Attention Attends with cues to redirect   Orientation Level Oriented X4   Memory Within functional limits   Following Commands Follows one step commands with increased time or repetition   Comments pt motivated for therapy, Latvian speaking occassional slow processing, difficulty with multistep commands possible language barrier (?) continue to assess  Activity Tolerance   Activity Tolerance Patient limited by fatigue;Patient tolerated treatment well   Medical Staff Made Aware RN   Assessment   Assessment Patient participated in Skilled OT session this date with interventions consisting of self care tasks at EOB, left UE therapeutic exercises with PROM to elbow/shoulder and  strengthening exercises, functional transfers including commode transfer   Patient agreeable to OT treatment session, upon arrival patient was found supine in bed    In comparison to previous session, patient with improvements in self care tasks   Patient requiring verbal cues for safety, verbal cues for correct technique and frequent rest periods  Patient continues to be functioning below baseline level, occupational performance remains limited secondary to factors listed above and increased risk for falls and injury  The patient's raw score on the AM-PAC Daily Activity inpatient short form is 14, standardized score is 33 39, less than 39 4  Patients at this level are likely to benefit from discharge to post-acute rehabilitation services  Please refer to the recommendation of the Occupational Therapist for safe discharge planning  From OT standpoint, recommendation at time of d/c would be Short Term Rehab  Patient to benefit from continued Occupational Therapy treatment while in the hospital to address deficits as defined above and maximize level of functional independence with ADLs and functional mobility  Plan   Treatment Interventions ADL retraining;Functional transfer training;UE strengthening/ROM; Endurance training;Patient/family training;Fine motor coordination activities; Compensatory technique education; Energy conservation; Activityengagement   Goal Expiration Date 08/27/22   OT Treatment Day 3   OT Frequency 3-5x/wk   Recommendation   OT Discharge Recommendation [facility] acute rehabilitation services   [facility]   Lower Body Dressing 2   Bathing 2   Toileting 2   Upper Body Dressing 2   Grooming 3   Eating 3   Daily Activity Raw Score 14   Daily Activity Standardized Score (Calc for Raw Score >=11) 33 39   Turning Head Towards Sound 4   Follow Simple Instructions 3   Low Function Daily Activity Raw Score 21   Low Function Daily Activity Standardized Score 33 59   AM-PAC Applied Cognition Inpatient   Following a Speech/Presentation 2   Understanding Ordinary Conversation 4   Taking Medications 3   Remembering Where Things Are Placed or Put Away 3   Remembering List of 4-5 Errands 3   Taking Care of Complicated Tasks 2   Applied Cognition Raw Score 17   Applied Cognition Standardized Score 36 52   ,Savanna Caicedo MOT, OTR/L

## 2022-08-17 NOTE — PLAN OF CARE
Problem: PHYSICAL THERAPY ADULT  Goal: Performs mobility at highest level of function for planned discharge setting  See evaluation for individualized goals  Description: Treatment/Interventions: Functional transfer training, LE strengthening/ROM, Therapeutic exercise, Endurance training, Patient/family training, Bed mobility, Equipment eval/education, Gait training, Compensatory technique education, Spoke to nursing, OT (balance training)          See flowsheet documentation for full assessment, interventions and recommendations  Outcome: Progressing  Note: Prognosis: Fair  Problem List: Decreased strength, Decreased endurance, Impaired balance, Decreased mobility, Impaired tone, Impaired sensation  Assessment: Pt agreeable to participate in PT session  Pt performed functional mobility and therex as outlined above  Continues to demonstrate 0/5 LLE strength with MMT however able to active quads and glutes in static standing with VC/TC  Progressed to stand pivot bed>commode>chair  Pt taking step with RLE during stand pivot however unable to move LLE 2* decreased strength  Trails at HR performing lateral WS to  Promote L WB/pregait activities  modAx2 (1 at trunk for stability and 1 at LLE for foot advancement and placement)  Pt left seated in chair with chair alarm, call bell, phone, and all personal needs within reach  Pt will continue to benefit from skilled acute care PT to further address their functional mobility limitations  D/C recommendations remain rehab  Barriers to Discharge: Inaccessible home environment, Decreased caregiver support     PT Discharge Recommendation: Post acute rehabilitation services    See flowsheet documentation for full assessment

## 2022-08-17 NOTE — ARC ADMISSION
Call received from [address] [place] [address]   Confirming she received initial PT / OT Evaluation notee that were faxed earlier  Ceci Jolley requested updated PT notes be faxed to her and any nursing notes related to OT be sent to her  Requested information faxed through [employer]

## 2022-08-18 LAB
ANION GAP SERPL CALCULATED.3IONS-SCNC: 7 MMOL/L (ref 4–13)
BUN SERPL-MCNC: 35 MG/DL (ref 5–25)
CALCIUM SERPL-MCNC: 9 MG/DL (ref 8.3–10.1)
CHLORIDE SERPL-SCNC: 107 MMOL/L (ref 96–108)
CO2 SERPL-SCNC: 22 MMOL/L (ref 21–32)
CREAT SERPL-MCNC: 1.24 MG/DL (ref 0.6–1.3)
GFR SERPL CREATININE-BSD FRML MDRD: 40 ML/MIN/1.73SQ M
GLUCOSE SERPL-MCNC: 115 MG/DL (ref 65–140)
POTASSIUM SERPL-SCNC: 4.5 MMOL/L (ref 3.5–5.3)
SODIUM SERPL-SCNC: 136 MMOL/L (ref 135–147)

## 2022-08-18 PROCEDURE — 99232 SBSQ HOSP IP/OBS MODERATE 35: CPT | Performed by: PSYCHIATRY & NEUROLOGY

## 2022-08-18 PROCEDURE — 80048 BASIC METABOLIC PNL TOTAL CA: CPT

## 2022-08-18 RX ADMIN — PRAVASTATIN SODIUM 40 MG: 40 TABLET ORAL at 17:02

## 2022-08-18 RX ADMIN — FUROSEMIDE 20 MG: 20 TABLET ORAL at 09:57

## 2022-08-18 RX ADMIN — NEBIVOLOL 5 MG: 5 TABLET ORAL at 10:04

## 2022-08-18 RX ADMIN — DABIGATRAN ETEXILATE MESYLATE 150 MG: 150 CAPSULE ORAL at 21:54

## 2022-08-18 RX ADMIN — SPIRONOLACTONE 25 MG: 25 TABLET ORAL at 10:05

## 2022-08-18 RX ADMIN — VERAPAMIL HYDROCHLORIDE 240 MG: 240 TABLET ORAL at 21:54

## 2022-08-18 RX ADMIN — DABIGATRAN ETEXILATE MESYLATE 150 MG: 150 CAPSULE ORAL at 09:57

## 2022-08-18 RX ADMIN — MAGNESIUM OXIDE TAB 400 MG (241.3 MG ELEMENTAL MG) 400 MG: 400 (241.3 MG) TAB at 09:57

## 2022-08-18 RX ADMIN — LOSARTAN POTASSIUM 100 MG: 50 TABLET, FILM COATED ORAL at 09:56

## 2022-08-18 NOTE — RESTORATIVE TECHNICIAN NOTE
Restorative Technician Note      Patient Name: Wisam Tavera     Note Type: Mobility  Patient Position Upon Consult: Supine  Activity Performed: Transferred; Dangled; Stood  Assistive Device: Other (Comment) (Assist x2 OOB to the chair stand-pivot)  Education Provided: Yes  Patient Position at End of Consult: Bedside chair;  All needs within reach; Bed/Chair alarm activated      [facility] HERNESTO GUTIERREZ, Restorative Technician, [employer]

## 2022-08-18 NOTE — PROGRESS NOTES
08/17/22 1539   Clinical Encounter Type   Visited With Patient   Routine Visit Follow-up   Christianity Encounters   Christianity Needs Prayer   Sacramental Encounters   Communion Given Indicator Yes   Sacrament Other Other (Comment)  ([place]olia by Fr Shad Fischer)

## 2022-08-18 NOTE — PROGRESS NOTES
NEUROLOGY RESIDENCY PROGRESS NOTE     Name: [facility]   Age & Sex: 80 y o  female   MRN: [id number]  Unit/Bed#: [facility] 110-87   Encounter: [id number]    [facility] will need follow up in in 6 weeks with neurovascular Attending/AP/resident  She will require a CTA head/neck within 12 weeks  ASSESSMENT & PLAN     * Arterial ischemic stroke, MCA (middle cerebral artery), right, acute (HCC)  Assessment & Plan  80 y o  F presented to [facility] on 8/12/2022 as a stroke alert  Presenting deficits: contracted and weak LUE, flaccid LLE, left facial droop, mild dysarthria, and mild sensory deficits on the left side  Upon arrival to the ED, /80 on arrival   NIHSS 11  CTA h/n revealed near occlusive thormbus at R MCA bifurcation  Not a TNK candidate due to Xarelto use the night prior; not a thrombectomy candidate per Neurosurgery  Was started on stroke protocol heparin gtt and admitted on the stroke pathway  Pertinent PMHx: Afib on Xarelto, sick sinus syndrome s/p pacemaker (not MRI conditional), HTN, and HLD      [place]:    · Lipid Panel: Cholesterol 100, LDL 38   · Hemoglobin A1c 5 4  · TTE: Left Atrium: moderately dilated  · CTH: focal area of gliosis within the high right postcentral gyrus and scattered chronic microvascular ischemic changes with more focal lucency along the anterior limb of the right internal capsule, but no acute hemorrhage  · CTA h/n: near occlusive thrombus at the right MCA bifurcation, mild beading of the mid to distal ICAs suspicious for mild fibromuscular dysplasia, and mild atherosclerotic disease at bilateral distal carotid arteries  · CTP: 8 mL mismatch  · Repeat [facility] 8/12 revealed subtle loss of gray-white matter differentiation in the right temporal lobe and insular cortex in keeping with right MCA territory infarction    - Unable to get MRI as pacemaker is not compatible   Exam: brisk reflexes in LUE, spasticity in LUE, LLE, hemiplegia in LUE, LLE      Impression: Etiology remains unclear, but most likely due to Xarelto failure in the setting of known afib  No known cancer or hypercoagulable history  Plan:  - Stroke pathway  - on Pradaxa 150 mg BID for AC (was on xarelto previously)  - Previously on Crestor 10 mg, recommend to decrease to Crestor 5 mg (LDL 38)   - changed to alternative pravastatin 40mg equivalent to crestor 5mg  - BP goals: normotension; cautiously restarted home meds  - Euglycemic, normothermic goal  - Continue telemetry  - PT/OT/ST recommended inpt rehab and family prefers ARC and CM working on auth/placement    - pending insurance approval  - Stroke education  - Frequent neuro checks  Continue to monitor and notify neurology with any changes   - STAT CT head for any acute change in neuro exam    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 [facility])  Assessment & Plan  - CTA head/neck - near occlusive thrombus at the R MCA bifurcation, mild beading of the mid to distal ICAs suspicious for mild fibromuscular dysplasia, and mild atherosclerotic disease at bilateral distal carotid arteries  -  Switch AC to Pradaxa on 8/15  - will likely need CTA h/n in 3 months to re-evaluate thrombus     Chronic atrial fibrillation (HCC)  Assessment & Plan  · On Xarelto 15 mg daily QHS  No missed doses  Last dose on 8/11 before going to bed  · Was on stroke protocol heparin gtt (PTT goal 50-70)  · on Pradaxa since 8/15    Hyperlipidemia LDL goal <100  Assessment & Plan  · Upon DC, recommend Crestor 5 mg daily (previously on 10 mg at home)   · on pravastatin 40 mg equivalent to Crestor 5 mg    Essential hypertension  Assessment & Plan  · Normotension; restarted home meds (Verapamil 240 mg QHS, Spirono 25 mg QD, Cozaar 100 mg QD, Lasix 20 mg QD)   · Close hemodynamic monitoring  · Low threshold to hold off Verapamil at night time if BP during the day drops significantly               SUBJECTIVE     Patient was seen and examined  No acute events overnight  He still has left-sided weakness although has improvement in the left upper extremity but lower extremity on left side is still not able to be moved  Understands that she is currently pending approval for insurance and admission to our given noted that they are currently available but she is aware that son is agreeable to possibly [facility] otherwise  She denies any fevers/chill, chest pain, shortness of breath, abdominal pain, nausea, vomiting, diarrhea, problems urinating, problems  With bowel movements, and is eating/drinking without problem     She denies any headaches, syncope, paresthesias, diplopia, visual changes, tinnitus, sudden onset weakness, garbled speech, dysarthria/slurring of words,  Loss of bowel or bladder      Review of Systems    OBJECTIVE     Patient ID: Sapphire Bautista is a 80 y o  female  Vitals:    22 2108 22 0537 22 0724 22 1008   BP: 122/88  (!) 128/47 128/55   Pulse: 64  66    Resp:       Temp:   97 8 °F (36 6 °C)    TempSrc:       SpO2: 94%  91%    Weight:  60 9 kg (134 lb 4 2 oz)     Height:          Temperature:   Temp (24hrs), Av 8 °F (36 6 °C), Min:97 8 °F (36 6 °C), Max:97 8 °F (36 6 °C)    Temperature: 97 8 °F (36 6 °C)      Physical exam:  Vitals reviewed  General Examination: No distress, cooperative  CVS: S1, S2 noted       Neurological exam:     Mental Status  A, Ox3  Follows simple, 3 step commands  Speech: fluent, normal rhythm, no dysarthria       Cranial Nerves  CN II: Visual fields full to confrontation  CN III, IV, VI: EOMI bilaterally  Normal lids and orbits bilaterally  Pupils equal round and reactive to light bilaterally  No nystagmus  CN V: Facial sensation is normal   CN VII:  Right: There is no facial weakness or asymmetry  Left: There is no facial weakness or asymmetry  CN VIII: Audition intact to finger rub bilaterally  CN IX, X: Uvula midline   Palate elevates symmetrically  CN XI:  Right: Sternocleidomastoid strength is normal  Trapezius strength is normal   Left: Sternocleidomastoid strength is normal  Trapezius strength is normal   CN XII: Tongue midline without atrophy or fasciculations with appropriate movement      Motor  Spasticity, hemiplegia in left upper, lower extremities  - left upper extremity has shown improvement has been 3+/ 5 although still contracted  5/5 strength in right upper, lower extremities throughout      Sensory   RUE, RLE: light touch, pinprick sensation preserved  LUE, LLE: decreased light touch, pinprick sensations      Reflexes Right Left   Brachioradialis     +2  +3   Biceps                                   +2  +3   Triceps  +2  +3   Patellar   +3  +3      Ankle clonus absent b/l  Plantars mute bilaterally      Coordination  Finger-to-nose-normal on the right       Gait  Deferred          LABORATORY DATA     Labs: I have personally reviewed pertinent reports     and I have personally reviewed pertinent films in PACS  Results from last 7 days   Lab Units 08/17/22  0515 08/16/22  0457 08/15/22  0500 08/13/22  0555   WBC Thousand/uL 9 80 6 97 9 91 8 00   HEMOGLOBIN g/dL 11 8 12 0 11 9 10 2*   HEMATOCRIT % 36 8 37 8 38 0 31 4*   PLATELETS Thousands/uL 323 302 243 199   NEUTROS PCT %  --   --   --  73   MONOS PCT %  --   --   --  10      Results from last 7 days   Lab Units 08/18/22  0427 08/17/22  0515 08/16/22  0457   SODIUM mmol/L 136 135 137   POTASSIUM mmol/L 4 5 4 3 3 7   CHLORIDE mmol/L 107 106 108   CO2 mmol/L 22 22 22   BUN mg/dL 35* 28* 19   CREATININE mg/dL 1 24 1 05 0 99   CALCIUM mg/dL 9 0 8 8 9 0     Results from last 7 days   Lab Units 08/13/22  0555   MAGNESIUM mg/dL 1 8     Results from last 7 days   Lab Units 08/13/22  0555   PHOSPHORUS mg/dL 2 9      Results from last 7 days   Lab Units 08/15/22  0905 08/14/22  2032 08/14/22  0746 08/12/22  2153 08/12/22  1702 08/12/22  0940   INR   --   --   --   --  1 57* 1 86*   PTT seconds 64* 59* 68*   < > 50*  57* 29    < > = values in this interval not displayed  IMAGING & DIAGNOSTIC TESTING     Radiology Results: I have personally reviewed pertinent reports  and I have personally reviewed pertinent films in PACS    CTA head and neck w wo contrast   Final Result by Levy Ricci MD (08/15 1253)      Evolving right MCA distribution infarct  No evidence for secondary parenchymal hematoma  Interval recanalization of previously seen thrombus at the right MCA bifurcation  Middle cerebral arteries are patent on the current exam without evidence for major branch vessel occlusion  Stable beading of the mid to distal internal carotid arteries again suggestive of mild fibromuscular dysplasia  Workstation performed: [id number]         CT head wo contrast   Final Result by Susana White MD (08/13 1301)      Interval development of subtle loss of gray-white matter differentiation in the right temporal lobe and insular cortex in keeping with right MCA territory infarction (series 2 images 11-14 )  There is no hemorrhagic conversion  Workstation performed: [id number]         CT cerebral perfusion   Final Result by Levy Ricci MD (08/12 1108)      CT perfusion performed  Data available on PACS  Workstation performed: [id number]         CTA stroke alert (head/neck)   Final Result by Levy Ricci MD (08/12 1032)      Near occlusive thrombus at the right MCA bifurcation  Mild beading of the mid to distal internal carotid arteries suspicious for mild fibromuscular dysplasia  Atherosclerotic plaque at the carotid bulbs without evidence for high-grade stenosis or focal occlusion of the cervical vasculature  Partially imaged pulmonary edema and layering bilateral pleural effusions        Findings were directly discussed with Rell Lin at 10:00 AM                         Workstation performed: [id number]         CT stroke alert brain   Final Result by Levy Ricci MD (08/12 1021)      No definite CT evidence for large acute vascular distribution infarct or acute intracranial hemorrhage  Findings were directly discussed with Razia Jimeenz at 10:00 AM          Workstation performed: [id number]         CTA head and neck w wo contrast    (Results Pending)       Other Diagnostic Testing: I have personally reviewed pertinent reports  and I have personally reviewed pertinent films in PACS    ACTIVE MEDICATIONS     Current Facility-Administered Medications   Medication Dose Route Frequency    acetaminophen (TYLENOL) tablet 650 mg  650 mg Oral Q6H PRN    dabigatran etexilate (PRADAXA) capsule 150 mg  150 mg Oral Q12H [facility]    furosemide (LASIX) tablet 20 mg  20 mg Oral Daily    losartan (COZAAR) tablet 100 mg  100 mg Oral Daily    magnesium oxide (MAG-OX) tablet 400 mg  400 mg Oral Daily    nebivolol (BYSTOLIC) tablet 5 mg  5 mg Oral Daily    pravastatin (PRAVACHOL) tablet 40 mg  40 mg Oral Daily With Dinner    spironolactone (ALDACTONE) tablet 25 mg  25 mg Oral Daily    verapamil (CALAN-SR) CR tablet 240 mg  240 mg Oral HS       Prior to Admission medications    Medication Sig Start Date End Date Taking?  Authorizing Provider   apixaban (Eliquis) 5 mg Take 1 tablet (5 mg total) by mouth 2 (two) times a day 8/14/22 9/13/22 Yes Twyla Vasquez 96, DO   dabigatran etexilate (PRADAXA) 150 mg capsu Take 1 capsule (150 mg total) by mouth 2 (two) times a day PRICE CHECK DO NOT FILL 8/14/22 9/13/22 Yes Twyla Vasquez 96, DO   Blood Pressure Monitoring (Blood Pressure Cuff) MISC Use every other day For HTN 4/28/21   Antonio Gomes PA-C   cyanocobalamin 1,000 mcg/mL INJECT 1 ML (1,000 MCG TOTAL) INTO A MUSCLE EVERY 30 (THIRTY) DAYS 1/7/22   Marie Hanley MD   furosemide (LASIX) 20 mg tablet Take 1 tablet (20 mg total) by mouth daily 5/3/22   Jamzyn Ferguson MD   losartan (COZAAR) 100 MG tablet Take 1 tablet (100 mg total) by mouth daily 3/23/21   Shelia Valencia MD   magnesium oxide (MAG-OX) 400 mg TAKE 1 TABLET (400 MG TOTAL) BY MOUTH DAILY 1/26/22   Enrique Mckee Minankushx, DO   nebivolol (BYSTOLIC) 5 mg tablet Take 1 tablet (5 mg total) by mouth daily 12/14/21   Mary Chaudhary MD   polyvinyl alcohol (LIQUIFILM TEARS) 1 4 % ophthalmic solution Administer 1 drop to the right eye as needed for dry eyes 12/13/21   Lyn Loots, DO   potassium chloride (MICRO-K) 10 MEQ CR capsule Take 1 capsule (10 mEq total) by mouth daily 5/3/22   Mary Chaudhary MD   rivaroxaban (Xarelto) 15 mg tablet Take 1 tablet (15 mg total) by mouth daily at bedtime 1/18/22   Armando Randle PA-C   rosuvastatin (CRESTOR) 10 MG tablet TAKE 1 TABLET (10 MG TOTAL) BY MOUTH DAILY 6/2/22   Vianey Fraser MD   spironolactone (ALDACTONE) 25 mg tablet Take 25 mg by mouth daily 3/28/22   Historical Provider, MD   triamcinolone (KENALOG) 0 1 % cream Apply topically 2 (two) times a day Apply to left arm topically 2 times daily for 1 week 5/2/22   Trang Nieves MD   verapamil (CALAN-SR) 240 mg CR tablet TAKE 1 TABLET (240 MG TOTAL) BY MOUTH DAILY AT BEDTIME 7/26/22   Trang Nieves MD         VTE Pharmacologic Prophylaxis: Dabigatran (Pradaxa)  VTE Mechanical Prophylaxis: sequential compression device    ==  Augusto Godwin 28  Neurology Residency, PGY-2

## 2022-08-18 NOTE — ARC ADMISSION
Received call from [facility]  stating patients authorization request for Acute Inpt Rehab has been approved (Authorization # [id number]) starting 8/17/22 through 8/18/2022 with a NRD of 8/19/2022  Concurrent Review Nurse is Annabella Ferrari her Phone: [phone] &   Fax # [phone]  ARC Liaison requested to change admission date to no ARC bed available until tomorrow, 8/19/2022  Yogesh Mcneal stated once the patient admits to [facility] the ARC CM will need to contact them to have the [facility] date changed  ARC Liaison contacted ARC CM Reji Rey  informed her of same  [facility]2022 pending patients that are scheduled for discharge remain cleared for discharge  Provided update with same to [address] through [facility]

## 2022-08-18 NOTE — CASE MANAGEMENT
Case Management Discharge Planning Note    Patient name Peaceval Blunt  Location [address] 702/PPHP 241-27 MRN [id number]  : 1940 Date 2022       Current Admission Date: 2022  Current Admission Diagnosis:Arterial ischemic stroke, MCA (middle cerebral artery), right, acute [facility])   Patient Active Problem List    Diagnosis Date Noted    Arterial ischemic stroke, MCA (middle cerebral artery), right, acute ([facility] ) 2022    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 ([facility] ) 2022    Renal insufficiency     Abnormal nuclear stress test 2021    Diverticulosis of colon 2019    Presence of cardiac pacemaker 2018    Tubulovillous adenoma 2018    Gastroesophageal reflux disease without esophagitis 2018    Essential hypertension 2017    Hyperlipidemia LDL goal <100 2017    Stage [facility] 2017    Osteoarthritis of both wrists 2017    Chronic atrial fibrillation ([facility] ) 2017    Cavus deformity of foot 2017    Hallux abducto valgus, bilateral 2017    Lipoma of right upper extremity 2017    Pain due to onychomycosis of toenails of both feet 2017    Allergic rhinitis due to pollen 10/12/2015    Midline cystocele 2014    Osteoarthritis of hip 2014    B12 deficiency 2013    Osteopenia 11/15/2013    Left renal artery stenosis ([facility] ) 2013    Left atrial enlargement 2013      LOS (days): 6  Geometric Mean LOS (GMLOS) (days): 2 90  Days to GMLOS:-3 1     OBJECTIVE:  Risk of Unplanned Readmission Score: 12 99         Current admission status: Inpatient   Preferred Pharmacy:   [facility] [address] [place] [address]  Phone: [phone] Fax: [address] [facility] [address] [address] [facility] [address] [address]  Phone: [phone] Fax: [phone]    Primary Care Provider: Shireen Woodruff MD    [place] [employer] [address] REP  Secondary Insurance: [facility]    DISCHARGE DETAILS:           Additional Comments: The pt remains medically stable for discharge  CM conferred with [facility]/ARC liasion and is aware updated OT notes were forwarded to the pt's insurance provider, Lisa Guzman for review  Authorization is still pending as of this morning  CM will continue to follow

## 2022-08-18 NOTE — PROGRESS NOTES
PHYSICAL MEDICINE AND REHABILITATION   PREADMISSION ASSESSMENT     Projected [facility] Diagnoses:  Impairment of mobility, safety, Activities of Daily Living (ADLs), and cognitive/communication skills due to Stroke:  01 1  Left Body Involvement (Right Brain)  Etiologic: Right MCA ischemic CVA   Date of Onset: 8/12/2022   Date of surgery: n/a    PATIENT INFORMATION  Name: Jm Morales Phone #: [phone] (home)   Address: [address] [place] [address]  YOB: 1940 Age: 80 y o  SS# [id number]  Marital Status:   Ethnicity:   Employment Status: retired  Extended Emergency Contact Information  Primary Emergency Contact[facility] Phone: [phone]  Relation: Son  Secondary Emergency Contact: Carlton Nascimento [facility]  Mobile Phone: [phone]  Relation: Daughter In-Law  Advance Directive: Level 1 Full Code    INSURANCE/COVERAGE:     Primary Payor: [address] [place] [address] REP / Plan: Alfredo Jointer / Product Type: Medicare HMO /   Secondary Payer: Private Pay   Payer Contact:  Rock Recinos Payer Contact:   Contact Phone: [phone]  RBU:[phone]   Contact Phone:     Authorization #: [id number]  Coverage Dates: 8/17-8/19  LCD: 8/19      MEDICARE #: Munday [place]  Medicare Days: n/a  Medical Record #: [id number]    REFERRAL SOURCE:   Referring provider: Keely Bell MD  Referring facility: [address] [place] [address]  Room: [address] [place] [address] 70/[facility] [id number]  PCP: Zac Morley MD PCP phone number: [phone]    MEDICAL INFORMATION  HPI: Per Neurology: 80 y o  female with Afib on Xarelto, sick sinus syndrome s/p pacemaker (not MRI conditional), HTN, and HLD who presented to [facility] on [facility]12/2022 as a stroke alert for contracted and weak L UE, flaccid L LE, left facial droop, mild dysarthria, and mild sensory deficits on the left side  Patient has Afib and takes Xarelto    Last known normal at 11 PM last night when going to bed  This morning around 6:30 AM, patient woke up and the left arm was contracted without any movement and the left leg was flaccid  She also had a left facial droop  Upon arrival to the ED, /80 on arrival   NIHSS 11  CT head shows focal area of gliosis within the high right postcentral gyrus and scattered chronic microvascular ischemic changes with more focal lucency along the anterior limb of the right internal capsule, but no acute hemorrhage  CTA head/neck shows near occlusive thrombus at the right MCA bifurcation, mild beading of the mid to distal ICAs suspicious for mild fibromuscular dysplasia, and mild atherosclerotic disease at bilateral distal carotid arteries  CT cerebral perfusion shows 8 mL mismatch  Patient was not a tPA candidate due to bleeding risk from taking Xarelto last night  Attending Neurologist discussed case with Dr Erik Villela with IR Neurosurgery, and patient is not a thrombectomy candidate  Patient was started on stroke protocol heparin gtt and admitted to the stroke pathway  Per Neurology while etiology for stroke remains unclear, but most likely due to Xarelto failure in the setting of know afib  Currently patient is now on Pradaxa for [facility] since 8/15  Additionally patient is taking pravastatin and restarting home BP medications with close hemodynamic monitoring  Patient does continue to exhibits spasticity in LUE >LLE along with L hemiparesis  PMR was consulted during patient's stay where recommendations for physiatry management for spasticity in LUE and LLE highly encouraged  Current recommendations to begin Baclofen along with providing passive stretch daily by therapy and/or nursing team  PT/OT/ST therapies were also consulted during pt's stay with recommending patient for inpatient acute rehab, as patient has demonstrated the ability to tolerate and participate in 3 hours of therapy daily   Patient has been hemodynamically stable and medically cleared for discharge to the [address] [place] [address] vaccination status: Moderna- dose 1: 2/19/2021; dose 2:5/27/2021  COVID test results pending          Past Medical History:   Past Surgical History: Allergies:     Past Medical History:   Diagnosis Date    A-fib ([address] )     Anemia     Arthritis     Breast pain, right     Chest pain on breathing     Colon polyp     Cough     Diverticulosis     Encounter for screening colonoscopy     H/O sick sinus syndrome     Heart murmur     High cholesterol     History of atrial fibrillation     Rate ontrolled  On xarelto 15mg (creatinine 50) Followed by   [facility] with Cardiology     History of weight loss     Report loose fitting clothes  Weight stable (3lbs difference)  Last colo showed small tubular adenoma x2  Breast biopsy last showed fibrocystic changes  TSH wnl  Will check cbc, bmp, spep   Hx of long term use of blood thinners     Hypertension     Irregular heart beat     Pacemaker     Preop examination     Shortness of breath     Viral bronchitis     Past Surgical History:   Procedure Laterality Date    BREAST BIOPSY Right 08/17/2006    ultrasound guided Percutaneous Needle Core -Benign    CATARACT EXTRACTION      CATARACT EXTRACTION W/ INTRAOCULAR LENS  IMPLANT, BILATERAL      COLONOSCOPY      COLONOSCOPY N/A 5/22/2018    Procedure: COLONOSCOPY;  Surgeon: Jose Powell DO;  [facility]; Service: Gastroenterology    Heredia Arabia / Justin Ferreirar / Burton Ashley Right     as a child after a fall    MAMMO STEREOTACTIC BREAST BIOPSY RIGHT (ALL INC) Right 10/2014    benign    NE CMBND ANTERPOST COLPORRAPHY W/CYSTO N/A 3/17/2016    Procedure: COLPORRHAPHY ANTERIOR POSTERIOR ;  Surgeon: Victor Manuel Noriega MD;  [facility];  Service: Gynecology    NE COLONOSCOPY FLX DX W/[facility] REHABILITATION WHEN PFRMD N/A 4/4/2019    Procedure: COLONOSCOPY;  Surgeon: Jose Powell DO;  [facility];   Service: Gastroenterology    KS CYSTOURETHROSCOPY N/A 3/17/2016    Procedure: CYSTOSCOPY;  Surgeon: Taryn Bowman MD;  [facility];  Service: Gynecology    KS ESOPHAGOGASTRODUODENOSCOPY TRANSORAL DIAGNOSTIC N/A 4/16/2018    Procedure: EGD AND COLONOSCOPY;  Surgeon: Gerald Gonzalez DO;  [facility]; Service: Gastroenterology    KS LAP,SURG,COLECTOMY, PARTIAL, W/ANAST Right 1/13/2022    Procedure: Diagnostic laparoscopy, laparscopic right colectomy;  Surgeon: Ashley Bishop MD;  [facility];  Service: Colorectal    KS REVAGINAL PROLAPSE,SACROSP LIG N/A 3/17/2016    Procedure: COLPOPEXY VAGINAL EXTRAPERITONEAL (VEC) ANTERIOR ;  Surgeon: Taryn Bowman MD;  [facility];  Service: Gynecology    KS SLING OPER STRES INCONTINENCE N/A 3/17/2016    Procedure: INSERTION PUBOVAGINAL SLING SINGLE INCISION ;  Surgeon: Taryn Bowman MD;  [facility];  Service: Gynecology     Allergies   Allergen Reactions    Other Itching     Bandaids    Tape [Medical Tape] Itching         Medical/functional conditions requiring inpatient rehabilitation: right MCA ischemic stroke, L hemiplegia with LUE and LLE spasticity, impaired sensation L hemibody, impaired mobility and self care, impaired cognition    Risk for medical/clinical complications: risk for falls, risk for skin breakdown, risk for hypertensive/hypotensive episodes, risk for additional infarcts       Comorbidities/Surgeries in the last 100 days: Debility/Physical Deconditioning, Hypertension, Stroke (Ischemic), Weakness and L sided weakness/spasticity    CURRENT VITAL SIGNS:   Temp:  [97 4 °F (36 3 °C)-97 8 °F (36 6 °C)] 97 4 °F (36 3 °C)  HR:  [61-66] 61  Resp:  [19] 19  BP: (122-128)/(47-88) 126/53   Intake/Output Summary (Last 24 hours) at 8/18/2022 1549  Last data filed at 8/18/2022 1300  Gross per 24 hour   Intake 600 ml   Output --   Net 600 ml        LABORATORY RESULTS:      Lab Results   Component Value Date    HGB 11 8 08/17/2022    HGB 12 3 11/25/2015    HCT 36 8 08/17/2022    HCT 37 8 11/25/2015    WBC 9 80 08/17/2022    WBC 6 56 11/25/2015     Lab Results   Component Value Date    BUN 35 (H) 08/18/2022    BUN 24 11/25/2015     11/25/2015    K 4 5 08/18/2022    K 4 0 11/25/2015     08/18/2022     11/25/2015    GLUCOSE 88 11/25/2015    CREATININE 1 24 08/18/2022    CREATININE 1 09 11/25/2015     Lab Results   Component Value Date    PROTIME 19 0 (H) 08/12/2022    PROTIME 23 3 (H) 05/20/2014    INR 1 57 (H) 08/12/2022    INR 2 14 (H) 05/20/2014        DIAGNOSTIC STUDIES:  CTA head and neck w wo contrast    Result Date: 8/15/2022  Impression: Evolving right MCA distribution infarct  No evidence for secondary parenchymal hematoma  Interval recanalization of previously seen thrombus at the right MCA bifurcation  Middle cerebral arteries are patent on the current exam without evidence for major branch vessel occlusion  Stable beading of the mid to distal internal carotid arteries again suggestive of mild fibromuscular dysplasia  Workstation performed: [id number]     CT head wo contrast    Result Date: 8/13/2022  Impression: Interval development of subtle loss of gray-white matter differentiation in the right temporal lobe and insular cortex in keeping with right MCA territory infarction (series 2 images 11-14 )  There is no hemorrhagic conversion  Workstation performed: [id number]     CT stroke alert brain    Result Date: 8/12/2022  Impression: No definite CT evidence for large acute vascular distribution infarct or acute intracranial hemorrhage  Findings were directly discussed with Razia Jimenez at 10:00 AM  Workstation performed: [id number]     CT cerebral perfusion    Result Date: 8/12/2022  Impression: CT perfusion performed  Data available on PACS   Workstation performed: [id number]     CTA stroke alert (head/neck)    Result Date: 8/12/2022  Impression: Near occlusive thrombus at the right MCA bifurcation  Mild beading of the mid to distal internal carotid arteries suspicious for mild fibromuscular dysplasia  Atherosclerotic plaque at the carotid bulbs without evidence for high-grade stenosis or focal occlusion of the cervical vasculature  Partially imaged pulmonary edema and layering bilateral pleural effusions   Findings were directly discussed with Anne Davila at 10:00 AM  Workstation performed: [id number]       PRECAUTIONS/SPECIAL NEEDS:  Anticoagulation:  Pradaxa, Safety Concerns, IV: Type:peripheral Location:R forearm Reason:medications as needed, Dietary Restrictions: regular diet w/ tihn liquids, Language Preference: Turkmen but does know some English,  Needed and Fall Precautions    MEDICATIONS:     Current Facility-Administered Medications:     acetaminophen (TYLENOL) tablet 650 mg, 650 mg, Oral, Q6H PRN, Rikki Ye MD, 650 mg at 08/15/22 1934    dabigatran etexilate (PRADAXA) capsule 150 mg, 150 mg, Oral, Q12H [address], Layne Kaufman MD, 150 mg at 08/18/22 0957    furosemide (LASIX) tablet 20 mg, 20 mg, Oral, Daily, Layne Kaufman MD, 20 mg at 08/18/22 0957    losartan (COZAAR) tablet 100 mg, 100 mg, Oral, Daily, Layne Kaufman MD, 100 mg at 08/18/22 0956    magnesium oxide (MAG-OX) tablet 400 mg, 400 mg, Oral, Daily, Layne Kaufman MD, 400 mg at 08/18/22 0957    nebivolol (BYSTOLIC) tablet 5 mg, 5 mg, Oral, Daily, Layne Kaufman MD, 5 mg at 08/18/22 1004    pravastatin (PRAVACHOL) tablet 40 mg, 40 mg, Oral, Daily With Dinner, Raiza Labrum, DO, 40 mg at 08/17/22 1811    spironolactone (ALDACTONE) tablet 25 mg, 25 mg, Oral, Daily, Layne Kaufman MD, 25 mg at 08/18/22 1005    verapamil (CALAN-SR) CR tablet 240 mg, 240 mg, Oral, HS, Layne Kaufman MD, 240 mg at 08/17/22 2112    SKIN INTEGRITY:   no rashes, no erythema, no peripheral edema, no wounds    PRIOR LEVEL OF FUNCTION:  She lives in [facility] apartment with [facility]  Deepa Garrett is  and lives alone  Self Care: Independent, Indoor Mobility: Independent and Cognition: Independent, BUT has HHA to assist with cooking and cleaning    FALLS IN THE LAST 6 MONTHS: 0    HOME ENVIRONMENT:  The living area: elevator and can live on one level  There are No steps to enter the home  The patient will not have 24 hour supervision available upon discharge  PREVIOUS DME:  Equipment in home (previous DME): Shower Chair and [facility]    FUNCTIONAL STATUS:  Physical Therapy Occupational Therapy Speech Therapy     08/17/22 1121    PT Last Visit   PT Visit Date 08/17/22   Note Type   Note Type Treatment   Pain Assessment   Pain Assessment Tool 0-10   Pain Score No Pain   Restrictions/Precautions   Weight Bearing Precautions Per Order No   Other Precautions Chair Alarm; Bed Alarm;Multiple lines;Telemetry; Fall Risk  (L hemiparesis, increased tone LUE and LE, LUE flexion tone)   General   Chart Reviewed Yes   Response to Previous Treatment Patient with no complaints from previous session  Family/Caregiver Present No   Cognition   Overall Cognitive Status WFL   Arousal/Participation Cooperative   Attention Attends with cues to redirect   Orientation Level Oriented X4   Memory Within functional limits   Following Commands Follows one step commands without difficulty   Comments very pleasant to work with   Subjective   Subjective agreeable to participate   Bed Mobility   Supine to Sit 3  Moderate assistance   Additional items Assist x 1;HOB elevated; Increased time required;Verbal cues;LE management   Sit to Supine Unable to assess   Additional Comments remained seated in chair with alarm upon conclusion   Transfers   Sit to Stand 3  Moderate assistance   Additional items Assist x 1; Increased time required;Verbal cues   Stand to Sit 3  Moderate assistance   Additional items Assist x 1; Increased time required;Verbal cues   Stand pivot 3  Moderate assistance   Additional items Assist x 2; Increased time required;Verbal cues  (x2 trials to R  R HHA and 2nd therapist around waist )   Additional Comments (S)  STS transfers performed with therapist anterior and L knee block  x5 STS throughout  L arm held in fexion by pt  Balance   Static Sitting Fair -   Dynamic Sitting Poor +   Static Standing Poor   Dynamic Standing Poor -   Endurance Deficit   Endurance Deficit Yes   Endurance Deficit Description L hemiparesis, fatigue   Activity Tolerance   Activity Tolerance Patient limited by fatigue   Medical Staff Made Aware co-tx with OT, Kacey 2* medical complexity and decreased activity tolerance   Exercises   Knee AROM Long Arc Quad Sitting;10 reps;AAROM; Left  (tapping to distal quad)   Marching Sitting;10 reps;PROM; Left   Neuro re-ed standing at R HR with modAx1: lateral WS 2x10 reps  with modAx2 (1 at trunk, 1 at LLE), fwd/bwd step x1 rep with LLE   Balance training  static/dynamic sitting EOB with S/minAx1 respectfully x12 min  Assessment   Prognosis Fair   Problem List Decreased strength;Decreased endurance; Impaired balance;Decreased mobility; Impaired tone; Impaired sensation   Assessment Pt agreeable to participate in PT session  Pt performed functional mobility and therex as outlined above  Continues to demonstrate 0/5 LLE strength with MMT however able to active quads and glutes in static standing with VC/TC  Progressed to stand pivot bed>commode>chair  Pt taking step with RLE during stand pivot however unable to move LLE 2* decreased strength  Trails at HR performing lateral WS to  Promote L WB/pregait activities  modAx2 (1 at trunk for stability and 1 at LLE for foot advancement and placement)  Pt left seated in chair with chair alarm, call bell, phone, and all personal needs within reach  Pt will continue to benefit from skilled acute care PT to further address their functional mobility limitations   D/C recommendations remain rehab         08/17/22 0900    OT Last Visit   OT Visit Date 08/17/22   Note Type   Note Type Treatment   Restrictions/Precautions   Weight Bearing Precautions Per Order No   Other Precautions Telemetry; Fall Risk;Cognitive; Chair Alarm; Bed Alarm  (+left hemiparesis, +left glenohumeral subluxation)   Lifestyle   Autonomy I with ADLs and functional mobility without AD  Reciprocal Relationships Supportive son   Service to Others Retired   [address] Enjoys reading and watching tv   Pain Assessment   Pain Assessment Tool 0-10   Pain Score No Pain   ADL   Where Assessed Edge of bed  (pt performed EOB sitting for approx~10-12 minutes with CG static sitting, min a dynamic sitting balance with posterior lean and cues for spatial awareness to correct to midline sitting )   Grooming Assistance 5  Supervision/Setup   Grooming Deficit Wash/dry face   UB Bathing Assistance 4  Minimal Assistance   UB Bathing Deficit Left arm   LB Bathing Assistance 2  Maximal Assistance   LB Bathing Deficit Right lower leg including foot; Left lower leg including foot; Buttocks; Perineal area   UB Dressing Assistance 3  Moderate Assistance   UB Dressing Deficit Thread LUE;Pull around back  (to don hospital gown)   LB Dressing Assistance 2  Maximal Assistance   LB Dressing Deficit Don/doff R sock; Don/doff L sock   Toileting Assistance  3  Moderate Assistance   Toileting Deficit Perineal care   Bed Mobility   Supine to Sit 3  Moderate assistance   Additional items Assist x 1;HOB elevated; Increased time required;Verbal cues   Sit to Supine Unable to assess   Additional Comments pt in chair at end of session with all needs met and alarm engaged   Transfers   Sit to Stand 3  Moderate assistance   Additional items Assist x 1   Stand to Sit 3  Moderate assistance   Stand pivot 3  Moderate assistance   Additional items Assist x 2  (bed>commode with B/L HHA +left hemiparetic arm supported with transfer)   Additional Comments Multiple sit to stand transfers with mod a x 1 and SBA of another for safety wtih left hemparetic arm supported   Functional Mobility   Additional Comments unable to assess   Therapeutic Exercise - ROM   UE-ROM Yes   ROM- Right Upper Extremities   R Shoulder PROM; External rotation; Internal rotation; Flexion  (flexion ot 60*)   R Elbow Prolonged stretch;Elbow flexion;PROM  (tone to elbow)   R Hand Ring finger;AROM; Thumb; Index finger; Long finger;Little finger  (a)   RUE ROM Comment 1/2 width glenohumeral subluxation, high tone to elbow only, scapula gliding with shoulder IR/ER exercises  Cognition   Arousal/Participation Alert; Responsive; Cooperative   Attention Attends with cues to redirect   Orientation Level Oriented X4   Memory Within functional limits   Following Commands Follows one step commands with increased time or repetition   Comments pt motivated for therapy, Danish speaking occassional slow processing, difficulty with multistep commands possible language barrier (?) continue to assess  Activity Tolerance   Activity Tolerance Patient limited by fatigue;Patient tolerated treatment well   Medical Staff Made Aware RN   Assessment   Assessment Patient participated in Skilled OT session this date with interventions consisting of self care tasks at EOB, left UE therapeutic exercises with PROM to elbow/shoulder and  strengthening exercises, functional transfers including commode transfer   Patient agreeable to OT treatment session, upon arrival patient was found supine in bed  In comparison to previous session, patient with improvements in self care tasks   Patient requiring verbal cues for safety, verbal cues for correct technique and frequent rest periods  Patient continues to be functioning below baseline level, occupational performance remains limited secondary to factors listed above and increased risk for falls and injury  The patient's raw score on the AM-PAC Daily Activity inpatient short form is 14, standardized score is 33 39, less than 39 4   Patients at this level are likely to benefit from discharge to post-acute rehabilitation services  Please refer to the recommendation of the Occupational Therapist for safe discharge planning  From OT standpoint, recommendation at time of d/c would be Short Term Rehab  Patient to benefit from continued Occupational Therapy treatment while in the hospital to address deficits as defined above and maximize level of functional independence with ADLs and functional mobility  Speech/Language Pathology Progress Note     Patient Name: Sukumar Burgos  UKFXS'[id number] Date: 8/16/2022     Problem List  Principal Problem:    Arterial ischemic stroke, MCA (middle cerebral artery), right, acute ([address] )  Active Problems:    Essential hypertension    Hyperlipidemia LDL goal <100    Chronic atrial fibrillation ([facility] )    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 [facility])        Past Medical History  Medical History        Past Medical History:   Diagnosis Date    A-fib ([place] [address] )      Anemia      Arthritis      Breast pain, right      Chest pain on breathing      Colon polyp      Cough      Diverticulosis      Encounter for screening colonoscopy      H/O sick sinus syndrome      Heart murmur      High cholesterol      History of atrial fibrillation       Rate ontrolled  On xarelto 15mg (creatinine 50) Followed by Dr Peter Night with Cardiology     History of weight loss       Report loose fitting clothes  Weight stable (3lbs difference)  Last colo showed small tubular adenoma x2  Breast biopsy last showed fibrocystic changes  TSH wnl  Will check cbc, bmp, spep          Hx of long term use of blood thinners      Hypertension      Irregular heart beat      Pacemaker      Preop examination      Shortness of breath      Viral bronchitis              Past Surgical History  Surgical History         Past Surgical History:   Procedure Laterality Date    BREAST BIOPSY Right 08/17/2006     ultrasound guided Percutaneous Needle Core -Benign    CATARACT EXTRACTION        CATARACT EXTRACTION W/ INTRAOCULAR LENS  IMPLANT, BILATERAL        COLONOSCOPY        COLONOSCOPY N/A 5/22/2018     Procedure: COLONOSCOPY;  Surgeon: Lieutenant Sebastien DO;  [facility]; Service: Gastroenterology    INSERT / Kay Dull / James Coombe        LEG SURGERY Right       as a child after a fall    MAMMO STEREOTACTIC BREAST BIOPSY RIGHT (ALL INC) Right 10/2014     benign    MS CMBND ANTERPOST COLPORRAPHY W/CYSTO N/A 3/17/2016     Procedure: COLPORRHAPHY ANTERIOR POSTERIOR ;  Surgeon: Madhuri Doherty MD;  [facility];  Service: Gynecology    [facility] WHEN PFRMD N/A 4/4/2019     Procedure: COLONOSCOPY;  Surgeon: Lieutenant Sebastien DO;  [facility]; Service: Gastroenterology    MS CYSTOURETHROSCOPY N/A 3/17/2016     Procedure: Jered Schools;  Surgeon: Madhuri Doherty MD;  [facility];  Service: Gynecology    MS ESOPHAGOGASTRODUODENOSCOPY TRANSORAL DIAGNOSTIC N/A 4/16/2018     Procedure: EGD AND COLONOSCOPY;  Surgeon: Lieutenant Sebastien DO;  [facility]; Service: Gastroenterology    MS LAP,SURG,COLECTOMY, PARTIAL, W/ANAST Right 1/13/2022     Procedure: Diagnostic laparoscopy, laparscopic right colectomy;  Surgeon: Jane Peters MD;  [facility];  Service: Colorectal    MS REVAGINAL PROLAPSE,SACROSP LIG N/A 3/17/2016     Procedure: COLPOPEXY VAGINAL EXTRAPERITONEAL (VEC) ANTERIOR ;  Surgeon: Madhuri Doherty MD;  [facility];  Service: Gynecology    MS SLING OPER STRES INCONTINENCE N/A 3/17/2016     Procedure: INSERTION PUBOVAGINAL SLING SINGLE INCISION ;  Surgeon: Madhuri Doherty MD;  [facility];  Service: Gynecology               Subjective:  Pt awake and alert, lunch tray at bedside  Session completed in [facility] [place]    "I've been fighting hard and getting better!"     Current Diet:  Regular w/ thin      Objective:  Pt seen for dx dysphagia tx to assess PO tolerance across meal/larger sample  Labial seal is adequate for retrieval and oral containment  Mastication, bolus formation and transfer of all is functional  No signs of reduced oral control  Swallows appear timely  No overt s/s aspiration across meal  Education provided on strategies to optimize swallow safety and s/s dysphagia/aspiration to notify her medical team of should they arise  Pt demonstrated understanding and denied questions at this time       Assessment:  Oropharyngeal swallow skill appears GWFL     Plan/Recommendations:  Continue current diet  Frequent and thorough oral care       CARE SCORES:  Self Care:  Eatin: Setup or clean-up assistance  Oral hygiene: 04: Supervision or touching  assistance  Toilet hygiene: 03: Partial/moderate assistance  Shower/bathing self: 03: Partial/moderate assistance  Upper body dressin: Partial/moderate assistance  Lower body dressin: Substantial/maximal assistance  Putting on/taking off footwear: 09: Not applicable  Transfers:  Roll left and right: 09: Not applicable  Sit to lyin: Not applicable  Lying to sitting on side of bed: 03: Partial/moderate assistance  Sit to stand: 03: Partial/moderate assistance  Chair/bed to chair transfer: 03: Partial/moderate assistance  Toilet transfer: 09: Not applicable  Mobility:  Walk 10 ft: 88: Not attempted due to medical conditions or safety concerns  Walk 50 ft with two turns: 88: Not attempted due to medical conditions or safety concerns  Walk 150ft: 88: Not attempted due to medical conditions or safety concerns    CURRENT GAP IN FUNCTION  Prior to Admission: Functional Status: Patient was independent with mobility/ambulation, transfers, ADL's, IADL's  Estimated length of stay: 10 to 14 days    Anticipated Post-Discharge Disposition/Treatment  Disposition: Return to previous home/apartment   with support from son or HHA  Outpatient Services: Physical Therapy (PT), Occupational Therapy (OT) and Speech Therapy    BARRIERS TO DISCHARGE  Weakness, Pain, Balance Difficulty, Fatigue, Home Accessibility, Caregiver Accessibility, Equipment Needs, Resource Availability and Lives Alone    INTERVENTIONS FOR DISCHARGE  Adaptive equipment, Patient/Family/Caregiver Education, Community Resources, Financial Assistance, Arrange DME needs, Home Modifcations, Medication Changes per MD , Therapy exercises, Center of balance support , Energy conservation education , Attending Provider to contact and Stroke Education/Stroke Support Group    REQUIRED THERAPY:  Patient will require PT, OT and ST 60 minutes each per day, five days per week to achieve rehab goals  REQUIRED FUNCTIONAL AND MEDICAL MANAGEMENT FOR INPATIENT REHABILITATION:  Spasticity: The patient's spasticity is  improved and Baclofen will be continued, Skin:  There are no pressure sores currently, Pain Management: Overall pain is well controlled, Deep Vein Thrombosis (DVT) Prophylaxis:  Pradaxa, further IM management of additional medical conditions while on the [facility], PT/OT/ST intervention, patient/family education and training, potential for Neuropsych services as well as possibility for any other consulting services prn, nursing medication review and management of bladder/bowel function    RECOMMENDED LEVEL OF CARE:   Patient is an 80 y o  female with h/o Afib on Xarelto, sick sinus syndrome s/p pacemaker (not MRI conditional), HTN, and HLD who presented to [facility] as a stroke alert for contracted and weak L UE, flaccid L LE, left facial droop, mild dysarthria, and mild sensory deficits on the left side  Patient has Afib and takes Xarelto  Last known normal at 11 PM last night when going to bed  This morning around 6:30 AM, patient woke up and the left arm was contracted without any movement and the left leg was flaccid  She also had a left facial droop   Upon arrival to the ED, /80 on arrival  NIHSS 11   CT head shows focal area of gliosis within the high right postcentral gyrus and scattered chronic microvascular ischemic changes with more focal lucency along the anterior limb of the right internal capsule, but no acute hemorrhage  CTA head/neck shows near occlusive thrombus at the right MCA bifurcation, mild beading of the mid to distal ICAs suspicious for mild fibromuscular dysplasia, and mild atherosclerotic disease at bilateral distal carotid arteries  CT cerebral perfusion shows 8 mL mismatch  Patient was not a tPA candidate due to bleeding risk from taking Xarelto last night  Attending Neurologist discussed case with Dr Negra Iglesias with IR Neurosurgery, and patient is not a thrombectomy candidate  Patient was started on stroke protocol heparin gtt and admitted to the stroke pathway  Per Neurology while etiology for stroke remains unclear, but most likely due to Xarelto failure in the setting of know afib  Currently patient is now on Pradaxa for [facility] since 8/15  Additionally patient is taking pravastatin and restarting home BP medications with close hemodynamic monitoring  Patient does continue to exhibit spasticity in LUE >LLE along with L hemiparesis  PMR was consulted during patient's stay where recommendations for physiatry management for spasticity in LUE and LLE highly encouraged  Current recommendations to begin Baclofen along with providing passive stretch daily by therapy and/or nursing team  PT/OT/ST therapies were also consulted during patient's stay with recommending patient for inpatient acute rehab  Patient has been hemodynamically stable and medically cleared for discharge to the [facility]  Prior to patient's admission, she was fully independent with ambulation without use of assistive device, independent for ADL's and independent for most I ADL's but did have HHA for cooking/cleaning  Currently, patient is Mod A for bed mobility and Mod A x1-2 for stand pivot transfers where ambulation has not been assessed recently   Patient is currently Supervision for eating, grooming, Mod A for UB ADL's and Max A for LB ADL's  Patient is currently on a regular diet with thin liquids  Close medical management and PM&R management is recommended at this time while patient is on the [facility]  Inpatient acute rehab is recommended for patient to maximize overall strength, cognition and functional mobility upon discharge home with family support

## 2022-08-18 NOTE — PLAN OF CARE
Problem: Prexisting or High Potential for Compromised Skin Integrity  Goal: Skin integrity is maintained or improved  Description: INTERVENTIONS:  - Identify patients at risk for skin breakdown  - Assess and monitor skin integrity  - Assess and monitor nutrition and hydration status  - Monitor labs   - Assess for incontinence   - Turn and reposition patient  - Assist with mobility/ambulation  - Relieve pressure over bony prominences  - Avoid friction and shearing  - Provide appropriate hygiene as needed including keeping skin clean and dry  - Evaluate need for skin moisturizer/barrier cream  - Collaborate with interdisciplinary team   - Patient/family teaching  - Consider wound care consult   Outcome: Progressing     Problem: MOBILITY - ADULT  Goal: Maintain or return to baseline ADL function  Description: INTERVENTIONS:  -  Assess patient's ability to carry out ADLs; assess patient's baseline for ADL function and identify physical deficits which impact ability to perform ADLs (bathing, care of mouth/teeth, toileting, grooming, dressing, etc )  - Assess/evaluate cause of self-care deficits   - Assess range of motion  - Assess patient's mobility; develop plan if impaired  - Assess patient's need for assistive devices and provide as appropriate  - Encourage maximum independence but intervene and supervise when necessary  - Involve family in performance of ADLs  - Assess for home care needs following discharge   - Consider OT consult to assist with ADL evaluation and planning for discharge  - Provide patient education as appropriate  Outcome: Progressing  Goal: Maintains/Returns to pre admission functional level  Description: INTERVENTIONS:  - Perform BMAT or MOVE assessment daily    - Set and communicate daily mobility goal to care team and patient/family/caregiver  - Collaborate with rehabilitation services on mobility goals if consulted  - Perform Range of Motion 3 times a day    - Reposition patient every 2 hours   - Dangle patient 3 times a day  - Stand patient 3 times a day  - Ambulate patient 3 times a day  - Out of bed to chair 3 times a day   - Out of bed for meals 3 times a day  - Out of bed for toileting  - Record patient progress and toleration of activity level   Outcome: Progressing     Problem: PAIN - ADULT  Goal: Verbalizes/displays adequate comfort level or baseline comfort level  Description: Interventions:  - Encourage patient to monitor pain and request assistance  - Assess pain using appropriate pain scale  - Administer analgesics based on type and severity of pain and evaluate response  - Implement non-pharmacological measures as appropriate and evaluate response  - Consider cultural and social influences on pain and pain management  - Notify physician/advanced practitioner if interventions unsuccessful or patient reports new pain  Outcome: Progressing     Problem: INFECTION - ADULT  Goal: Absence or prevention of progression during hospitalization  Description: INTERVENTIONS:  - Assess and monitor for signs and symptoms of infection  - Monitor lab/diagnostic results  - Monitor all insertion sites, i e  indwelling lines, tubes, and drains  - Monitor endotracheal if appropriate and nasal secretions for changes in amount and color  - [place] appropriate cooling/warming therapies per order  - Administer medications as ordered  - Instruct and encourage patient and family to use good hand hygiene technique  - Identify and instruct in appropriate isolation precautions for identified infection/condition  Outcome: Progressing  Goal: Absence of fever/infection during neutropenic period  Description: INTERVENTIONS:  - Monitor WBC    Outcome: Progressing     Problem: SAFETY ADULT  Goal: Maintain or return to baseline ADL function  Description: INTERVENTIONS:  -  Assess patient's ability to carry out ADLs; assess patient's baseline for ADL function and identify physical deficits which impact ability to perform ADLs (bathing, care of mouth/teeth, toileting, grooming, dressing, etc )  - Assess/evaluate cause of self-care deficits   - Assess range of motion  - Assess patient's mobility; develop plan if impaired  - Assess patient's need for assistive devices and provide as appropriate  - Encourage maximum independence but intervene and supervise when necessary  - Involve family in performance of ADLs  - Assess for home care needs following discharge   - Consider OT consult to assist with ADL evaluation and planning for discharge  - Provide patient education as appropriate  Outcome: Progressing  Goal: Maintains/Returns to pre admission functional level  Description: INTERVENTIONS:  - Perform BMAT or MOVE assessment daily    - Set and communicate daily mobility goal to care team and patient/family/caregiver  - Collaborate with rehabilitation services on mobility goals if consulted  - Perform Range of Motion 3 times a day  - Reposition patient every 2 hours    - Dangle patient 3 times a day  - Stand patient 3 times a day  - Ambulate patient 3 times a day  - Out of bed to chair 3 times a day   - Out of bed for meals 3 times a day  - Out of bed for toileting  - Record patient progress and toleration of activity level   Outcome: Progressing  Goal: Patient will remain free of falls  Description: INTERVENTIONS:  - Educate patient/family on patient safety including physical limitations  - Instruct patient to call for assistance with activity   - Consult OT/PT to assist with strengthening/mobility   - Keep Call bell within reach  - Keep bed low and locked with side rails adjusted as appropriate  - Keep care items and personal belongings within reach  - Initiate and maintain comfort rounds  - Make Fall Risk Sign visible to staff  - Offer Toileting every 2 Hours, in advance of need  - Initiate/Maintain bed alarm  - Apply yellow socks and bracelet for high fall risk patients  - Consider moving patient to room near nurses station  Outcome: Progressing     Problem: DISCHARGE PLANNING  Goal: Discharge to home or other facility with appropriate resources  Description: INTERVENTIONS:  - Identify barriers to discharge w/patient and caregiver  - Arrange for needed discharge resources and transportation as appropriate  - Identify discharge learning needs (meds, wound care, etc )  - Arrange for interpretive services to assist at discharge as needed  - Refer to Case Management Department for coordinating discharge planning if the patient needs post-hospital services based on physician/advanced practitioner order or complex needs related to functional status, cognitive ability, or social support system  Outcome: Progressing     Problem: Knowledge Deficit  Goal: Patient/family/caregiver demonstrates understanding of disease process, treatment plan, medications, and discharge instructions  Description: Complete learning assessment and assess knowledge base    Interventions:  - Provide teaching at level of understanding  - Provide teaching via preferred learning methods  Outcome: Progressing     Problem: Potential for Falls  Goal: Patient will remain free of falls  Description: INTERVENTIONS:  - Educate patient/family on patient safety including physical limitations  - Instruct patient to call for assistance with activity   - Consult OT/PT to assist with strengthening/mobility   - Keep Call bell within reach  - Keep bed low and locked with side rails adjusted as appropriate  - Keep care items and personal belongings within reach  - Initiate and maintain comfort rounds  - Make Fall Risk Sign visible to staff  - Offer Toileting every 2 Hours, in advance of need  - Initiate/Maintain bed alarm  - Apply yellow socks and bracelet for high fall risk patients  - Consider moving patient to room near nurses station  Outcome: Progressing

## 2022-08-18 NOTE — PLAN OF CARE
Problem: Prexisting or High Potential for Compromised Skin Integrity  Goal: Skin integrity is maintained or improved  Description: INTERVENTIONS:  - Identify patients at risk for skin breakdown  - Assess and monitor skin integrity  - Assess and monitor nutrition and hydration status  - Monitor labs   - Assess for incontinence   - Turn and reposition patient  - Assist with mobility/ambulation  - Relieve pressure over bony prominences  - Avoid friction and shearing  - Provide appropriate hygiene as needed including keeping skin clean and dry  - Evaluate need for skin moisturizer/barrier cream  - Collaborate with interdisciplinary team   - Patient/family teaching  - Consider wound care consult   Outcome: Progressing     Problem: MOBILITY - ADULT  Goal: Maintain or return to baseline ADL function  Description: INTERVENTIONS:  -  Assess patient's ability to carry out ADLs; assess patient's baseline for ADL function and identify physical deficits which impact ability to perform ADLs (bathing, care of mouth/teeth, toileting, grooming, dressing, etc )  - Assess/evaluate cause of self-care deficits   - Assess range of motion  - Assess patient's mobility; develop plan if impaired  - Assess patient's need for assistive devices and provide as appropriate  - Encourage maximum independence but intervene and supervise when necessary  - Involve family in performance of ADLs  - Assess for home care needs following discharge   - Consider OT consult to assist with ADL evaluation and planning for discharge  - Provide patient education as appropriate  Outcome: Progressing  Goal: Maintains/Returns to pre admission functional level  Description: INTERVENTIONS:  - Perform BMAT or MOVE assessment daily    - Set and communicate daily mobility goal to care team and patient/family/caregiver  - Collaborate with rehabilitation services on mobility goals if consulted  - Perform Range of Motion 3 times a day    - Reposition patient every 2 hours   - Dangle patient 3 times a day  - Stand patient 3 times a day  - Ambulate patient 3 times a day  - Out of bed to chair 3 times a day   - Out of bed for meals 3 times a day  - Out of bed for toileting  - Record patient progress and toleration of activity level   Outcome: Progressing     Problem: PAIN - ADULT  Goal: Verbalizes/displays adequate comfort level or baseline comfort level  Description: Interventions:  - Encourage patient to monitor pain and request assistance  - Assess pain using appropriate pain scale  - Administer analgesics based on type and severity of pain and evaluate response  - Implement non-pharmacological measures as appropriate and evaluate response  - Consider cultural and social influences on pain and pain management  - Notify physician/advanced practitioner if interventions unsuccessful or patient reports new pain  Outcome: Progressing     Problem: INFECTION - ADULT  Goal: Absence or prevention of progression during hospitalization  Description: INTERVENTIONS:  - Assess and monitor for signs and symptoms of infection  - Monitor lab/diagnostic results  - Monitor all insertion sites, i e  indwelling lines, tubes, and drains  - Monitor endotracheal if appropriate and nasal secretions for changes in amount and color  - [place] appropriate cooling/warming therapies per order  - Administer medications as ordered  - Instruct and encourage patient and family to use good hand hygiene technique  - Identify and instruct in appropriate isolation precautions for identified infection/condition  Outcome: Progressing  Goal: Absence of fever/infection during neutropenic period  Description: INTERVENTIONS:  - Monitor WBC    Outcome: Progressing     Problem: SAFETY ADULT  Goal: Maintain or return to baseline ADL function  Description: INTERVENTIONS:  -  Assess patient's ability to carry out ADLs; assess patient's baseline for ADL function and identify physical deficits which impact ability to perform ADLs (bathing, care of mouth/teeth, toileting, grooming, dressing, etc )  - Assess/evaluate cause of self-care deficits   - Assess range of motion  - Assess patient's mobility; develop plan if impaired  - Assess patient's need for assistive devices and provide as appropriate  - Encourage maximum independence but intervene and supervise when necessary  - Involve family in performance of ADLs  - Assess for home care needs following discharge   - Consider OT consult to assist with ADL evaluation and planning for discharge  - Provide patient education as appropriate  Outcome: Progressing  Goal: Maintains/Returns to pre admission functional level  Description: INTERVENTIONS:  - Perform BMAT or MOVE assessment daily    - Set and communicate daily mobility goal to care team and patient/family/caregiver  - Collaborate with rehabilitation services on mobility goals if consulted  - Perform Range of Motion 3 times a day  - Reposition patient every 2 hours    - Dangle patient 3 times a day  - Stand patient 3 times a day  - Ambulate patient 3 times a day  - Out of bed to chair 3 times a day   - Out of bed for meals 3 times a day  - Out of bed for toileting  - Record patient progress and toleration of activity level   Outcome: Progressing  Goal: Patient will remain free of falls  Description: INTERVENTIONS:  - Educate patient/family on patient safety including physical limitations  - Instruct patient to call for assistance with activity   - Consult OT/PT to assist with strengthening/mobility   - Keep Call bell within reach  - Keep bed low and locked with side rails adjusted as appropriate  - Keep care items and personal belongings within reach  - Initiate and maintain comfort rounds  - Make Fall Risk Sign visible to staff    - Apply yellow socks and bracelet for high fall risk patients  - Consider moving patient to room near nurses station  Outcome: Progressing     Problem: DISCHARGE PLANNING  Goal: Discharge to home or other facility with appropriate resources  Description: INTERVENTIONS:  - Identify barriers to discharge w/patient and caregiver  - Arrange for needed discharge resources and transportation as appropriate  - Identify discharge learning needs (meds, wound care, etc )  - Arrange for interpretive services to assist at discharge as needed  - Refer to Case Management Department for coordinating discharge planning if the patient needs post-hospital services based on physician/advanced practitioner order or complex needs related to functional status, cognitive ability, or social support system  Outcome: Progressing     Problem: Knowledge Deficit  Goal: Patient/family/caregiver demonstrates understanding of disease process, treatment plan, medications, and discharge instructions  Description: Complete learning assessment and assess knowledge base    Interventions:  - Provide teaching at level of understanding  - Provide teaching via preferred learning methods  Outcome: Progressing     Problem: Potential for Falls  Goal: Patient will remain free of falls  Description: INTERVENTIONS:  - Educate patient/family on patient safety including physical limitations  - Instruct patient to call for assistance with activity   - Consult OT/PT to assist with strengthening/mobility   - Keep Call bell within reach  - Keep bed low and locked with side rails adjusted as appropriate  - Keep care items and personal belongings within reach  - Initiate and maintain comfort rounds  - Make Fall Risk Sign visible to staff  - Offer Toileting every 2  Hours, in advance of need  - Initiate/Maintain bed alarm  - Obtain necessary fall risk management equipment:  - Apply yellow socks and bracelet for high fall risk patients  - Consider moving patient to room near nurses station  Outcome: Progressing

## 2022-08-19 ENCOUNTER — HOSPITAL ENCOUNTER (INPATIENT)
Facility: HOSPITAL | Age: 82
LOS: 25 days | Discharge: NURSING, REHAB OR LONG-TERM CARE | DRG: 057 | End: 2022-09-13
Payer: MEDICARE

## 2022-08-19 ENCOUNTER — TRANSITIONAL CARE MANAGEMENT (OUTPATIENT)
Dept: INTERNAL MEDICINE CLINIC | Facility: CLINIC | Age: 82
End: 2022-08-19

## 2022-08-19 VITALS
BODY MASS INDEX: 24.18 KG/M2 | TEMPERATURE: 97.7 F | WEIGHT: 136.47 LBS | HEART RATE: 63 BPM | RESPIRATION RATE: 19 BRPM | DIASTOLIC BLOOD PRESSURE: 51 MMHG | HEIGHT: 63 IN | SYSTOLIC BLOOD PRESSURE: 127 MMHG | OXYGEN SATURATION: 93 %

## 2022-08-19 DIAGNOSIS — G47.00 INSOMNIA: ICD-10-CM

## 2022-08-19 DIAGNOSIS — I10 ESSENTIAL HYPERTENSION: ICD-10-CM

## 2022-08-19 DIAGNOSIS — A49.8 PROTEUS INFECTION: ICD-10-CM

## 2022-08-19 DIAGNOSIS — J02.9 SORE THROAT: ICD-10-CM

## 2022-08-19 DIAGNOSIS — Z91.89 AT RISK FOR COPING DIFFICULTY: ICD-10-CM

## 2022-08-19 DIAGNOSIS — I48.20 CHRONIC ATRIAL FIBRILLATION (HCC): ICD-10-CM

## 2022-08-19 DIAGNOSIS — E78.5 HYPERLIPIDEMIA LDL GOAL <100: ICD-10-CM

## 2022-08-19 DIAGNOSIS — B37.0 THRUSH: ICD-10-CM

## 2022-08-19 DIAGNOSIS — R05.9 COUGH: ICD-10-CM

## 2022-08-19 DIAGNOSIS — I25.10 CAD (CORONARY ARTERY DISEASE): ICD-10-CM

## 2022-08-19 DIAGNOSIS — R33.9 URINARY RETENTION: ICD-10-CM

## 2022-08-19 DIAGNOSIS — R52 PAIN: ICD-10-CM

## 2022-08-19 DIAGNOSIS — K21.9 GASTROESOPHAGEAL REFLUX DISEASE WITHOUT ESOPHAGITIS: Chronic | ICD-10-CM

## 2022-08-19 DIAGNOSIS — Z91.89 AT RISK FOR CONSTIPATION: ICD-10-CM

## 2022-08-19 DIAGNOSIS — I63.511 ARTERIAL ISCHEMIC STROKE, MCA (MIDDLE CEREBRAL ARTERY), RIGHT, ACUTE (HCC): ICD-10-CM

## 2022-08-19 DIAGNOSIS — R25.2 SPASTICITY: ICD-10-CM

## 2022-08-19 DIAGNOSIS — N17.9 AKI (ACUTE KIDNEY INJURY) (HCC): Primary | ICD-10-CM

## 2022-08-19 PROBLEM — D64.9 ANEMIA: Status: ACTIVE | Noted: 2022-08-19

## 2022-08-19 PROBLEM — M62.838 MUSCLE SPASTICITY: Status: ACTIVE | Noted: 2022-08-19

## 2022-08-19 LAB
FLUAV RNA RESP QL NAA+PROBE: NEGATIVE
FLUBV RNA RESP QL NAA+PROBE: NEGATIVE
RSV RNA RESP QL NAA+PROBE: NEGATIVE
SARS-COV-2 RNA RESP QL NAA+PROBE: NEGATIVE

## 2022-08-19 PROCEDURE — 99223 1ST HOSP IP/OBS HIGH 75: CPT | Performed by: INTERNAL MEDICINE

## 2022-08-19 PROCEDURE — 0241U HB NFCT DS VIR RESP RNA 4 TRGT: CPT

## 2022-08-19 PROCEDURE — 99239 HOSP IP/OBS DSCHRG MGMT >30: CPT | Performed by: PSYCHIATRY & NEUROLOGY

## 2022-08-19 PROCEDURE — NC001 PR NO CHARGE

## 2022-08-19 PROCEDURE — 99233 SBSQ HOSP IP/OBS HIGH 50: CPT

## 2022-08-19 RX ORDER — VERAPAMIL HYDROCHLORIDE 240 MG/1
240 TABLET, FILM COATED, EXTENDED RELEASE ORAL
Status: DISCONTINUED | OUTPATIENT
Start: 2022-08-19 | End: 2022-09-13 | Stop reason: HOSPADM

## 2022-08-19 RX ORDER — PRAVASTATIN SODIUM 40 MG
40 TABLET ORAL
Status: CANCELLED | OUTPATIENT
Start: 2022-08-19

## 2022-08-19 RX ORDER — DABIGATRAN ETEXILATE 150 MG/1
150 CAPSULE ORAL 2 TIMES DAILY
Qty: 180 CAPSULE | Refills: 0 | OUTPATIENT
Start: 2022-08-19 | End: 2022-11-17

## 2022-08-19 RX ORDER — SPIRONOLACTONE 25 MG/1
25 TABLET ORAL DAILY
Status: DISCONTINUED | OUTPATIENT
Start: 2022-08-20 | End: 2022-08-19

## 2022-08-19 RX ORDER — NEBIVOLOL 5 MG/1
5 TABLET ORAL DAILY
Status: CANCELLED | OUTPATIENT
Start: 2022-08-20

## 2022-08-19 RX ORDER — POLYETHYLENE GLYCOL 3350 17 G/17G
17 POWDER, FOR SOLUTION ORAL DAILY PRN
Status: DISCONTINUED | OUTPATIENT
Start: 2022-08-19 | End: 2022-09-13 | Stop reason: HOSPADM

## 2022-08-19 RX ORDER — ACETAMINOPHEN 325 MG/1
650 TABLET ORAL EVERY 6 HOURS PRN
Status: DISCONTINUED | OUTPATIENT
Start: 2022-08-19 | End: 2022-09-13 | Stop reason: HOSPADM

## 2022-08-19 RX ORDER — VERAPAMIL HYDROCHLORIDE 240 MG/1
240 TABLET, FILM COATED, EXTENDED RELEASE ORAL
Status: CANCELLED | OUTPATIENT
Start: 2022-08-19

## 2022-08-19 RX ORDER — LOSARTAN POTASSIUM 50 MG/1
100 TABLET ORAL DAILY
Status: DISCONTINUED | OUTPATIENT
Start: 2022-08-20 | End: 2022-09-05

## 2022-08-19 RX ORDER — NEBIVOLOL 5 MG/1
5 TABLET ORAL DAILY
Status: DISCONTINUED | OUTPATIENT
Start: 2022-08-20 | End: 2022-09-13 | Stop reason: HOSPADM

## 2022-08-19 RX ORDER — ACETAMINOPHEN 325 MG/1
650 TABLET ORAL EVERY 6 HOURS PRN
Status: CANCELLED | OUTPATIENT
Start: 2022-08-19

## 2022-08-19 RX ORDER — BISACODYL 10 MG
10 SUPPOSITORY, RECTAL RECTAL DAILY PRN
Status: DISCONTINUED | OUTPATIENT
Start: 2022-08-19 | End: 2022-08-20

## 2022-08-19 RX ORDER — LOSARTAN POTASSIUM 50 MG/1
100 TABLET ORAL DAILY
Status: CANCELLED | OUTPATIENT
Start: 2022-08-20

## 2022-08-19 RX ORDER — FUROSEMIDE 20 MG/1
20 TABLET ORAL DAILY
Status: CANCELLED | OUTPATIENT
Start: 2022-08-20

## 2022-08-19 RX ORDER — FUROSEMIDE 20 MG/1
20 TABLET ORAL DAILY
Status: DISCONTINUED | OUTPATIENT
Start: 2022-08-20 | End: 2022-08-19

## 2022-08-19 RX ORDER — ROSUVASTATIN CALCIUM 10 MG/1
5 TABLET, COATED ORAL DAILY
Qty: 90 TABLET | Refills: 0 | Status: CANCELLED | OUTPATIENT
Start: 2022-08-19

## 2022-08-19 RX ORDER — DABIGATRAN ETEXILATE 150 MG/1
150 CAPSULE ORAL EVERY 12 HOURS SCHEDULED
Status: DISCONTINUED | OUTPATIENT
Start: 2022-08-19 | End: 2022-09-13 | Stop reason: HOSPADM

## 2022-08-19 RX ORDER — DABIGATRAN ETEXILATE 150 MG/1
150 CAPSULE ORAL EVERY 12 HOURS SCHEDULED
Status: CANCELLED | OUTPATIENT
Start: 2022-08-19

## 2022-08-19 RX ORDER — SPIRONOLACTONE 25 MG/1
25 TABLET ORAL DAILY
Status: CANCELLED | OUTPATIENT
Start: 2022-08-20

## 2022-08-19 RX ORDER — PRAVASTATIN SODIUM 40 MG
40 TABLET ORAL
Status: DISCONTINUED | OUTPATIENT
Start: 2022-08-19 | End: 2022-09-13 | Stop reason: HOSPADM

## 2022-08-19 RX ADMIN — DABIGATRAN ETEXILATE MESYLATE 150 MG: 150 CAPSULE ORAL at 08:48

## 2022-08-19 RX ADMIN — FUROSEMIDE 20 MG: 20 TABLET ORAL at 08:49

## 2022-08-19 RX ADMIN — VERAPAMIL HYDROCHLORIDE 240 MG: 240 TABLET ORAL at 22:06

## 2022-08-19 RX ADMIN — NEBIVOLOL 5 MG: 5 TABLET ORAL at 08:48

## 2022-08-19 RX ADMIN — LOSARTAN POTASSIUM 100 MG: 50 TABLET, FILM COATED ORAL at 08:49

## 2022-08-19 RX ADMIN — MAGNESIUM OXIDE TAB 400 MG (241.3 MG ELEMENTAL MG) 400 MG: 400 (241.3 MG) TAB at 08:48

## 2022-08-19 RX ADMIN — DABIGATRAN ETEXILATE MESYLATE 150 MG: 150 CAPSULE ORAL at 20:14

## 2022-08-19 RX ADMIN — PRAVASTATIN SODIUM 40 MG: 40 TABLET ORAL at 17:31

## 2022-08-19 RX ADMIN — SPIRONOLACTONE 25 MG: 25 TABLET ORAL at 08:48

## 2022-08-19 RX ADMIN — ACETAMINOPHEN 650 MG: 325 TABLET ORAL at 20:14

## 2022-08-19 NOTE — PLAN OF CARE
Problem: Prexisting or High Potential for Compromised Skin Integrity  Goal: Skin integrity is maintained or improved  Description: INTERVENTIONS:  - Identify patients at risk for skin breakdown  - Assess and monitor skin integrity  - Assess and monitor nutrition and hydration status  - Monitor labs   - Assess for incontinence   - Turn and reposition patient  - Assist with mobility/ambulation  - Relieve pressure over bony prominences  - Avoid friction and shearing  - Provide appropriate hygiene as needed including keeping skin clean and dry  - Evaluate need for skin moisturizer/barrier cream  - Collaborate with interdisciplinary team   - Patient/family teaching  - Consider wound care consult   Outcome: Adequate for Discharge     Problem: MOBILITY - ADULT  Goal: Maintain or return to baseline ADL function  Description: INTERVENTIONS:  -  Assess patient's ability to carry out ADLs; assess patient's baseline for ADL function and identify physical deficits which impact ability to perform ADLs (bathing, care of mouth/teeth, toileting, grooming, dressing, etc )  - Assess/evaluate cause of self-care deficits   - Assess range of motion  - Assess patient's mobility; develop plan if impaired  - Assess patient's need for assistive devices and provide as appropriate  - Encourage maximum independence but intervene and supervise when necessary  - Involve family in performance of ADLs  - Assess for home care needs following discharge   - Consider OT consult to assist with ADL evaluation and planning for discharge  - Provide patient education as appropriate  Outcome: Adequate for Discharge  Goal: Maintains/Returns to pre admission functional level  Description: INTERVENTIONS:  - Perform BMAT or MOVE assessment daily    - Set and communicate daily mobility goal to care team and patient/family/caregiver  - Collaborate with rehabilitation services on mobility goals if consulted  - Perform Range of Motion 3 times a day    - Reposition patient every 2 hours    - Dangle patient 3 times a day  - Stand patient 3 times a day  - Ambulate patient 3 times a day  - Out of bed to chair 3 times a day   - Out of bed for meals 3 times a day  - Out of bed for toileting  - Record patient progress and toleration of activity level   Outcome: Adequate for Discharge     Problem: PAIN - ADULT  Goal: Verbalizes/displays adequate comfort level or baseline comfort level  Description: Interventions:  - Encourage patient to monitor pain and request assistance  - Assess pain using appropriate pain scale  - Administer analgesics based on type and severity of pain and evaluate response  - Implement non-pharmacological measures as appropriate and evaluate response  - Consider cultural and social influences on pain and pain management  - Notify physician/advanced practitioner if interventions unsuccessful or patient reports new pain  Outcome: Adequate for Discharge     Problem: INFECTION - ADULT  Goal: Absence or prevention of progression during hospitalization  Description: INTERVENTIONS:  - Assess and monitor for signs and symptoms of infection  - Monitor lab/diagnostic results  - Monitor all insertion sites, i e  indwelling lines, tubes, and drains  - Monitor endotracheal if appropriate and nasal secretions for changes in amount and color  - [place] appropriate cooling/warming therapies per order  - Administer medications as ordered  - Instruct and encourage patient and family to use good hand hygiene technique  - Identify and instruct in appropriate isolation precautions for identified infection/condition  Outcome: Adequate for Discharge  Goal: Absence of fever/infection during neutropenic period  Description: INTERVENTIONS:  - Monitor WBC    Outcome: Adequate for Discharge     Problem: SAFETY ADULT  Goal: Maintain or return to baseline ADL function  Description: INTERVENTIONS:  -  Assess patient's ability to carry out ADLs; assess patient's baseline for ADL function and identify physical deficits which impact ability to perform ADLs (bathing, care of mouth/teeth, toileting, grooming, dressing, etc )  - Assess/evaluate cause of self-care deficits   - Assess range of motion  - Assess patient's mobility; develop plan if impaired  - Assess patient's need for assistive devices and provide as appropriate  - Encourage maximum independence but intervene and supervise when necessary  - Involve family in performance of ADLs  - Assess for home care needs following discharge   - Consider OT consult to assist with ADL evaluation and planning for discharge  - Provide patient education as appropriate  Outcome: Adequate for Discharge  Goal: Maintains/Returns to pre admission functional level  Description: INTERVENTIONS:  - Perform BMAT or MOVE assessment daily    - Set and communicate daily mobility goal to care team and patient/family/caregiver  - Collaborate with rehabilitation services on mobility goals if consulted  - Perform Range of Motion 3 times a day  - Reposition patient every 2 hours    - Dangle patient 3 times a day  - Stand patient 3 times a day  - Ambulate patient 3 times a day  - Out of bed to chair 3 times a day   - Out of bed for meals 3 times a day  - Out of bed for toileting  - Record patient progress and toleration of activity level   Outcome: Adequate for Discharge  Goal: Patient will remain free of falls  Description: INTERVENTIONS:  - Educate patient/family on patient safety including physical limitations  - Instruct patient to call for assistance with activity   - Consult OT/PT to assist with strengthening/mobility   - Keep Call bell within reach  - Keep bed low and locked with side rails adjusted as appropriate  - Keep care items and personal belongings within reach  - Initiate and maintain comfort rounds  - Make Fall Risk Sign visible to staff  - Offer Toileting every 2 Hours, in advance of need  - Initiate/Maintain bed alarm  - Apply yellow socks and bracelet for high fall risk patients  - Consider moving patient to room near nurses station  Outcome: Adequate for Discharge     Problem: DISCHARGE PLANNING  Goal: Discharge to home or other facility with appropriate resources  Description: INTERVENTIONS:  - Identify barriers to discharge w/patient and caregiver  - Arrange for needed discharge resources and transportation as appropriate  - Identify discharge learning needs (meds, wound care, etc )  - Arrange for interpretive services to assist at discharge as needed  - Refer to Case Management Department for coordinating discharge planning if the patient needs post-hospital services based on physician/advanced practitioner order or complex needs related to functional status, cognitive ability, or social support system  Outcome: Adequate for Discharge     Problem: Knowledge Deficit  Goal: Patient/family/caregiver demonstrates understanding of disease process, treatment plan, medications, and discharge instructions  Description: Complete learning assessment and assess knowledge base    Interventions:  - Provide teaching at level of understanding  - Provide teaching via preferred learning methods  Outcome: Adequate for Discharge     Problem: Potential for Falls  Goal: Patient will remain free of falls  Description: INTERVENTIONS:  - Educate patient/family on patient safety including physical limitations  - Instruct patient to call for assistance with activity   - Consult OT/PT to assist with strengthening/mobility   - Keep Call bell within reach  - Keep bed low and locked with side rails adjusted as appropriate  - Keep care items and personal belongings within reach  - Initiate and maintain comfort rounds  - Make Fall Risk Sign visible to staff  - Apply yellow socks and bracelet for high fall risk patients  - Consider moving patient to room near nurses station  Outcome: Adequate for Discharge

## 2022-08-19 NOTE — RESTORATIVE TECHNICIAN NOTE
Restorative Technician Note      Patient Name: Gold Salazar     Note Type: Mobility  Patient Position Upon Consult: Bedside chair  Activity Performed: Transferred; Dangled; Stood  Assistive Device: Other (Comment) (Assist x2)  Education Provided: Yes  Patient Position at End of Consult: Supine;  All needs within reach; Bed/Chair alarm activated  [facility] HERNESTO GUTIERREZ, Restorative Technician, [employer]

## 2022-08-19 NOTE — ARC ADMISSION
Call back received from [place] to change date patients authorization for Acute Inpt Rehab starts as was waiting for available ARC bed  Per Deborah Larry  Patient has been approved for ARC starting 8/19/22 through 8/23/22, NRD 8/23/22  Concurrent Nurse is Wiley Larry Ph: [phone], Fax: [phone]  ARC Liaison contacted ARC CM Anat Velazco to provide update with same

## 2022-08-19 NOTE — ARC ADMISSION
ARC Liaison met with patient at bedside: introduced self, explained role, ARC program/services, ARC three locations, transfer process to rehab unit, anticipated rehab length of stay, and rehab program booklet   ARC Rehab folder left with patient, all questions answered

## 2022-08-19 NOTE — CONSULTS
Internal Medicine Consultation Note    Patient: Godwin Cohn  Age/sex: 80 y o  female  Medical Record #: [id number]      Assessment/Plan    Acute embolic right MCA CVA  · Had near occlusion of right MCA  · Felt to be a Xarelto failure and was switched to Pradaxa  · ECHO w/o thrombus  · Continue statin    Chronic atrial fibrillation  · Sees Dr Melisa Lin as OP  · Rates controlled  · On Pradaxa now, Xarelto stopped    PPM  · VVI  · Is not MRI conditional    Valvular disease  · Current ECHO:  LVEF 65%, mod AR/mild AS, mod TR with severely elevated RV systolic pressure, mild-mod MR, mod LAE/mild NEO  · Euvolemic currently but will watch since CTA on admission showed pulm edema and b/l pleural effusions  · Pt is back on home dose of Lasix    HTN  · Home:  Verapamil 240mg qhs/Cozaar 449 mg qd/Bystolic 5mg qd/lasix 54WJ qd/Aldactome 25mg qd  · Here:  Verapamil 240mg qhs/Cozaar 644JS qd/Bystolic 5mg qd/ Lasix 2mg qd/Aldactone 25mg qd  · Since creat was up to 1 24 yesterday, will stop Lasix and Aldactone and re-eval with BMP in AM  · OP note states has left RA stenosis but no testing in OP records or Care Everywhere    HLD  · Home:  Crestor 10mg qd = Neuro rec when discharged to reduce to 5mg qd  · Here:  Pravachol 40mg qd    Creatinine elevation  · Yesterday was up to 1 24 with baseline 0 8 or so  · BMP in AM  · Will stop Lasix and Aldactone for now, BMP in AM  · Add back Lasix and Aldactone if BMP OK tomorrow 8/20/21    CAD  · Nonobstructive  · Card cath 8/2021 done for borderline abn stress test and found 50% mid RCA/40% mid LAD = no intervention done  · Continue statin  · Was not on ASA as an OP        Discharge date:  Team       Subjective/HPI:    Godwin Cohn is an 80year old patient with history of mild chronic anemia, chronic atrial fibrillation, SSS/PPM, HTN, LE edema, HLD, nonobstructive CAD and questionable left renal artery stenosis who was admitted as a stroke alert after presenting with LUE weakness, flaccid LLE, left facial droop, mild dysarthria and mild left sensory deficit  CT of the head showed no acute infarct/hemorrhage  CTA showed "near occlusive thrombus at the right MCA bifurcation; mild beading of the mid to distal internal carotid arteries suspicious for mild fibromuscular dysplasia; atherosclerotic plaque at the carotid bulbs without evidence for high-grade stenosis or focal occlusion of the cervical vasculature and partially imaged pulmonary edema and layering bilateral pleural effusions"  CT perfusion scan showed small to moderate volume penumbra  She was not a tPA candidate 2/2 bleed risk from being on Xarelto  She could not have a MRI 2/2 PPM which is not MRI conditional   Per Neurosurgery, not a candidate for thrombectomy  Neurology started IV Heparin  Lipid panel done showed a LDL of 38 and cholesterol of 100  She takes Crestor 10mg daily at home and Neuro recommended that she cut it back to 5mg daily  Pravachol 40mg daily is being substituted here  At home, patient takes Xarelto for chronic atrial fibrillation  She had not missed any doses  Her CVA was considered to be a Xarelto failure and she was switched to Pradaxa  Patient is now in [facility] for inpatient acute rehabilitation and we are ask to assist with medical management  Currently, denies CP, SOB, dizziness, N/V/D  Last BM was 8/18/22  Appetite is fair per her description  ROS:   A 10 point ROS was performed; negative except as noted above       Social History:    Substance Use History:   Social History     Substance and Sexual Activity   Alcohol Use Not Currently     Social History     Tobacco Use   Smoking Status Never Smoker   Smokeless Tobacco Never Used     Social History     Substance and Sexual Activity   Drug Use No       Family History:    Family History   Problem Relation Age of Onset    Diabetes Mother     Breast cancer Sister 48    No Known Problems Father     No Known Problems Maternal Grandmother  No Known Problems Maternal Grandfather     No Known Problems Paternal Grandmother     No Known Problems Paternal Grandfather     No Known Problems Daughter     No Known Problems Son     No Known Problems Sister     No Known Problems Sister     No Known Problems Brother     No Known Problems Brother     No Known Problems Sister     No Known Problems Paternal Aunt     No Known Problems Paternal Aunt     No Known Problems Paternal Aunt     No Known Problems Paternal Aunt          Review of Scheduled Meds:  Current Facility-Administered Medications   Medication Dose Route Frequency Provider Last Rate    acetaminophen  650 mg Oral Q6H PRN Latoya Lora MD      bisacodyl  10 mg Rectal Daily PRN Latoya Lora MD      dabigatran etexilate  150 mg Oral Q12H [address] MD Isaak Gamboa ON 8/20/2022] losartan  100 mg Oral Daily Latoya Lora MD      [START ON 8/20/2022] magnesium oxide  400 mg Oral Daily MD Isaak Gamboa ON 8/20/2022] nebivolol  5 mg Oral Daily Latoya Lora MD      polyethylene glycol  17 g Oral Daily PRN Latoya Lora MD      pravastatin  40 mg Oral Daily With Garrick White MD      verapamil  240 mg Oral HS Latoya Lora MD         Labs:     Results from last 7 days   Lab Units 08/17/22  0515 08/16/22  0457   WBC Thousand/uL 9 80 6 97   HEMOGLOBIN g/dL 11 8 12 0   HEMATOCRIT % 36 8 37 8   PLATELETS Thousands/uL 323 302     Results from last 7 days   Lab Units 08/18/22  0427 08/17/22  0515   SODIUM mmol/L 136 135   POTASSIUM mmol/L 4 5 4 3   CHLORIDE mmol/L 107 106   CO2 mmol/L 22 22   BUN mg/dL 35* 28*   CREATININE mg/dL 1 24 1 05   CALCIUM mg/dL 9 0 8 8     Results from last 7 days   Lab Units 08/13/22  0555   HEMOGLOBIN A1C % 5 4     Results from last 7 days   Lab Units 08/12/22  1702   INR  1 57*              No results found for: Denny Stagers, WOUNDCULT, SPUTUMCULTUR    Input and Output Summary (last 24 hours):     No intake or output data in the 24 hours ending 22 1604    Imaging:     No orders to display       *Labs /Radiology studies reviewed  *Medications reviewed and reconciled as needed  *Please refer to order section for additional ordered labs studies  *Case discussed with primary attending during morning huddle case rounds    Vitals:   Temp (24hrs), Av 8 °F (36 6 °C), Min:97 7 °F (36 5 °C), Max:97 9 °F (36 6 °C)    Temp:  [97 7 °F (36 5 °C)-97 9 °F (36 6 °C)] 97 7 °F (36 5 °C)  HR:  [62-63] 62  Resp:  [18] 18  BP: (127-132)/(51-84) 132/60  SpO2:  [92 %-97 %] 97 %  Body mass index is 24 8 kg/m²  Physical Exam:   General Appearance: no distress, conversive  HEENT: PERRLA, conjuctiva normal; oropharynx clear; mucous membranes moist   Neck:  Supple, normal ROM  Lungs: CTA, normal respiratory effort, no retractions, expiratory effort normal  CV: regular rate and rhythm; no rubs/murmurs/gallops, PMI normal   ABD: soft; ND/NT; +BS  EXT: no edema  Skin: normal turgor, normal texture, no rashes  Psych: affect normal, mood normal  Neuro: AAO  Face is symmetric  Speech is clear  Not able to lift left arm but  is 4/5, 4/5 EF  ?severe tone LUE  LLE 0/5         Invasive Devices  Report    Peripheral Intravenous Line  Duration           Peripheral IV 22 Right;Ventral (anterior) Forearm 3 days          Drain  Duration           External Urinary Catheter 5 days                 VTE Pharmacologic Prophylaxis: Pradaxa  Code Status: Level 1 - Full Code  Current Length of Stay: 0 day(s)    Total floor / unit time spent today 1 hour with more than 50% spent counseling/coordinating care  Counseling includes discussion with patient re: progress  and discussion with patient of his/her current medical state/information  Coordination of patient's care was performed in conjunction with primary service  Time invested included review of patient's labs, vitals, and management of their comorbidities with continued monitoring   In addition, this patient was discussed with medical team including physician and advanced extenders  The care of the patient was extensively discussed and appropriate treatment plan was formulated unique for this patient  ** Please Note: Fluency Direct voice to text software may have been used in the creation of this document   Audio transcription errors may occur**

## 2022-08-19 NOTE — ARC ADMISSION
Patient remains cleared for discharge and can admit to [facility] today into Room 468 with a transport time of 2:00pm, for Nurse Report please call [phone]  Provided update with same to care team through [address]

## 2022-08-19 NOTE — RESTORATIVE TECHNICIAN NOTE
Restorative Technician Note      Patient Name: Godwin Cohn     Note Type: Mobility  Patient Position Upon Consult: Supine  Activity Performed: Dangled; Stood; Transferred  Assistive Device: Other (Comment) (Assist x2)  Education Provided: Yes  Patient Position at End of Consult: Bedside chair;  All needs within reach; Bed/Chair alarm activated    Sole GUTIERREZ, Restorative Technician, [employer]

## 2022-08-19 NOTE — PROGRESS NOTES
PHYSICAL MEDICINE AND REHABILITATION   PREADMISSION ASSESSMENT     Projected [facility] Diagnoses:  Impairment of mobility, safety, Activities of Daily Living (ADLs), and cognitive/communication skills due to Stroke:  01 1  Left Body Involvement (Right Brain)  Etiologic: Right MCA Ischemic CVA   Date of Onset: 8/12/2022   Date of surgery: N/A    PATIENT INFORMATION  Name: Sapphire Bautista Phone #: [phone] (home)   Address: [address] [place] [address]  YOB: 1940 Age: 80 y o  SS#   Marital Status:   Ethnicity:   Employment Status: retired  Extended Emergency Contact Information  Primary Emergency Contact: Sab[facility]ha [facility]per [facility] Phone: [phone]  Relation: Son  Secondary Emergency Contact: Helder Nassar [facility]  Mobile Phone: [phone]  Relation: Daughter In-Law  Advance Directive: Level 1 Full Code    INSURANCE/COVERAGE:     Primary Payor: [address] [place], [place] [id number] [facility] REP / Plan: [employer] / Product Type: Medicare HMO /   Secondary Payer: [employer]    Payer Contact:  Elsa Mae Payer Contact:   Contact Phone: [address] [place] [address]   Contact Phone: [phone]     Authorization #: [id number]  Coverage Dates: 8/19/2022 through 8/23/2022  NRD 8/23/2022     MEDICARE #: n/a  Medicare Days: n/a  Medical Record #: [id number]    REFERRAL SOURCE:   Referring provider: Zayra Alvarez MD  Referring facility: [address] [place] [address]  Room: [address] [place] [phone]  PCP: Siomara Miller MD PCP phone number: [phone]    MEDICAL INFORMATION  HPI: Per Neurology: 80 y o  female with Afib on Xarelto, sick sinus syndrome s/p pacemaker (not MRI conditional), HTN, and HLD who presented to [facility] on 8/12/2022 as a stroke alert for contracted and weak L UE, flaccid L LE, left facial droop, mild dysarthria, and mild sensory deficits on the left side  Patient has Afib and takes Xarelto  Last known normal at 11 PM last night when going to bed  This morning around 6:30 AM, patient woke up and the left arm was contracted without any movement and the left leg was flaccid  She also had a left facial droop  Upon arrival to the ED, /80 on arrival   NIHSS 11  CT head shows focal area of gliosis within the high right postcentral gyrus and scattered chronic microvascular ischemic changes with more focal lucency along the anterior limb of the right internal capsule, but no acute hemorrhage  CTA head/neck shows near occlusive thrombus at the right MCA bifurcation, mild beading of the mid to distal ICAs suspicious for mild fibromuscular dysplasia, and mild atherosclerotic disease at bilateral distal carotid arteries  CT cerebral perfusion shows 8 mL mismatch  Patient was not a tPA candidate due to bleeding risk from taking Xarelto last night  Attending Neurologist discussed case with Dr Ruby Haro with IR Neurosurgery, and patient is not a thrombectomy candidate  Patient was started on stroke protocol heparin gtt and admitted to the stroke pathway  Per Neurology while etiology for stroke remains unclear, but most likely due to Xarelto failure in the setting of know afib  Currently patient is now on Pradaxa for [facility] since 8/15  Additionally patient is taking pravastatin and restarting home BP medications with close hemodynamic monitoring  Patient does continue to exhibits spasticity in LUE >LLE along with L hemiparesis  PMR was consulted during patient's stay where recommendations for physiatry management for spasticity in LUE and LLE highly encouraged  Current recommendations to begin Baclofen along with providing passive stretch daily by therapy and/or nursing team    Patient is hemodynamically stable at this time  PT/OT therapies were consulted and continue to follow patient at this time   PM&R was consulted and are recommending inpatient Acute Rehab when medically stable  All involved medical disciplines feel/agree patient is medically ready for discharge at this time  Inpatient acute rehabilitation physician was consulted  Upon review of patients case and correspondence with PT/OT therapies and PM&R services, [facility] physician feels Dea Lane will benefit and is a good candidate / appropriate for inpatient acute rehab at this time  She has demonstrated the willingness / desire and tolerance to participate in the required 3 hours or more of therapies per day  COVID vaccination status: Moderna: 2/19/2021, 5/27/2021  COVID test 8/19/2022: Negative       Past Medical History:   Past Surgical History: Allergies:     Past Medical History:   Diagnosis Date    A-fib ([address] )     Anemia     Arthritis     Breast pain, right     Chest pain on breathing     Colon polyp     Cough     Diverticulosis     Encounter for screening colonoscopy     H/O sick sinus syndrome     Heart murmur     High cholesterol     History of atrial fibrillation     Rate ontrolled  On xarelto 15mg (creatinine 50) Followed by Dr Katie Cespedes with Cardiology     History of weight loss     Report loose fitting clothes  Weight stable (3lbs difference)  Last colo showed small tubular adenoma x2  Breast biopsy last showed fibrocystic changes  TSH wnl  Will check cbc, bmp, spep   Hx of long term use of blood thinners     Hypertension     Irregular heart beat     Pacemaker     Preop examination     Shortness of breath     Viral bronchitis     Past Surgical History:   Procedure Laterality Date    BREAST BIOPSY Right 08/17/2006    ultrasound guided Percutaneous Needle Core -Benign    CATARACT EXTRACTION      CATARACT EXTRACTION W/ INTRAOCULAR LENS  IMPLANT, BILATERAL      COLONOSCOPY      COLONOSCOPY N/A 5/22/2018    Procedure: COLONOSCOPY;  Surgeon: Kacy Boo DO;  [facility];   Service: Gastroenterology    INSERT / Erlinda Grover / Alfredo Ling Right     as a child after a fall    MAMMO STEREOTACTIC BREAST BIOPSY RIGHT (ALL INC) Right 10/2014    benign    NE CMBND ANTERPOST COLPORRAPHY W/CYSTO N/A 3/17/2016    Procedure: COLPORRHAPHY ANTERIOR POSTERIOR ;  Surgeon: Celestine Mir MD;  [facility];  Service: Gynecology    NE COLONOSCOPY FLX DX W/[facility] REHABILITATION WHEN PFRMD N/A 4/4/2019    Procedure: COLONOSCOPY;  Surgeon: Kamila Jolley DO;  [facility]; Service: Gastroenterology    NE CYSTOURETHROSCOPY N/A 3/17/2016    Procedure: CYSTOSCOPY;  Surgeon: Celestine Mir MD;  [facility];  Service: Gynecology    NE ESOPHAGOGASTRODUODENOSCOPY TRANSORAL DIAGNOSTIC N/A 4/16/2018    Procedure: EGD AND COLONOSCOPY;  Surgeon: Kamila Jolley DO;  [facility]; Service: Gastroenterology    NE LAP,SURG,COLECTOMY, PARTIAL, W/ANAST Right 1/13/2022    Procedure: Diagnostic laparoscopy, laparscopic right colectomy;  Surgeon: Layton Alvarez MD;  [facility];  Service: Colorectal    NE REVAGINAL PROLAPSE,SACROSP LIG N/A 3/17/2016    Procedure: COLPOPEXY VAGINAL EXTRAPERITONEAL (VEC) ANTERIOR ;  Surgeon: Celestine Mir MD;  [facility];  Service: Gynecology    NE SLING OPER STRES INCONTINENCE N/A 3/17/2016    Procedure: INSERTION PUBOVAGINAL SLING SINGLE INCISION ;  Surgeon: Celestine Mir MD;  [facility];  Service: Gynecology     Allergies   Allergen Reactions    Other Itching     Bandaids    Tape [Medical Tape] Itching         Medical/functional conditions requiring inpatient rehabilitation: right MCA ischemic stroke, L hemiplegia with LUE and LLE spasticity, impaired sensation L hemibody, impaired mobility and self care, impaired cognition  Risk for medical/clinical complications: DVT/PE, risk for falls, risk for skin breakdown, risk for hypertensive/hypotensive episodes, risk for additional infarcts       Comorbidities/Surgeries in the last 100 days: Debility/Physical Deconditioning, Hypertension, Stroke (Ischemic), Weakness and L sided weakness/spasticity    CURRENT VITAL SIGNS:   Temp:  [97 4 °F (36 3 °C)-97 9 °F (36 6 °C)] 97 7 °F (36 5 °C)  HR:  [61-63] 63  Resp:  [19] 19  BP: (126-129)/(51-84) 127/51   Intake/Output Summary (Last 24 hours) at 8/19/2022 1255  Last data filed at 8/18/2022 2001  Gross per 24 hour   Intake 240 ml   Output 750 ml   Net -510 ml        LABORATORY RESULTS:      Lab Results   Component Value Date    HGB 11 8 08/17/2022    HGB 12 3 11/25/2015    HCT 36 8 08/17/2022    HCT 37 8 11/25/2015    WBC 9 80 08/17/2022    WBC 6 56 11/25/2015     Lab Results   Component Value Date    BUN 35 (H) 08/18/2022    BUN 24 11/25/2015     11/25/2015    K 4 5 08/18/2022    K 4 0 11/25/2015     08/18/2022     11/25/2015    GLUCOSE 88 11/25/2015    CREATININE 1 24 08/18/2022    CREATININE 1 09 11/25/2015     Lab Results   Component Value Date    PROTIME 19 0 (H) 08/12/2022    PROTIME 23 3 (H) 05/20/2014    INR 1 57 (H) 08/12/2022    INR 2 14 (H) 05/20/2014        DIAGNOSTIC STUDIES:  CTA head and neck w wo contrast    Result Date: 8/15/2022  Impression: Evolving right MCA distribution infarct  No evidence for secondary parenchymal hematoma  Interval recanalization of previously seen thrombus at the right MCA bifurcation  Middle cerebral arteries are patent on the current exam without evidence for major branch vessel occlusion  Stable beading of the mid to distal internal carotid arteries again suggestive of mild fibromuscular dysplasia  Workstation performed: [id number]     CT head wo contrast    Result Date: 8/13/2022  Impression: Interval development of subtle loss of gray-white matter differentiation in the right temporal lobe and insular cortex in keeping with right MCA territory infarction (series 2 images 11-14 )  There is no hemorrhagic conversion   Workstation performed: [id number]     CT stroke alert brain    Result Date: 8/12/2022  Impression: No definite CT evidence for large acute vascular distribution infarct or acute intracranial hemorrhage  Findings were directly discussed with Laura Barrios at 10:00 AM  Workstation performed: [id number]     CT cerebral perfusion    Result Date: 8/12/2022  Impression: CT perfusion performed  Data available on PACS  Workstation performed: [id number]     CTA stroke alert (head/neck)    Result Date: 8/12/2022  Impression: Near occlusive thrombus at the right MCA bifurcation  Mild beading of the mid to distal internal carotid arteries suspicious for mild fibromuscular dysplasia  Atherosclerotic plaque at the carotid bulbs without evidence for high-grade stenosis or focal occlusion of the cervical vasculature  Partially imaged pulmonary edema and layering bilateral pleural effusions   Findings were directly discussed with Laura Barrios at 10:00 AM  Workstation performed: [id number]       PRECAUTIONS/SPECIAL NEEDS:  Anticoagulation:  Pradaxa, Safety Concerns, IV: Type:peripheral Location:R forearm Reason:medications as needed, Dietary Restrictions: regular diet w/ tihn liquids, Language Preference: Greek but does know some English,  Needed and Fall Precautions    MEDICATIONS:     Current Facility-Administered Medications:     acetaminophen (TYLENOL) tablet 650 mg, 650 mg, Oral, Q6H PRN, Arleen MD Enrrique, 650 mg at 08/15/22 1934    dabigatran etexilate (PRADAXA) capsule 150 mg, 150 mg, Oral, Q12H [address], Manuelito Garsia MD, 150 mg at 08/19/22 0848    furosemide (LASIX) tablet 20 mg, 20 mg, Oral, Daily, Manuelito Garsia MD, 20 mg at 08/19/22 0849    losartan (COZAAR) tablet 100 mg, 100 mg, Oral, Daily, Manuelito Garsia MD, 100 mg at 08/19/22 0849    magnesium oxide (MAG-OX) tablet 400 mg, 400 mg, Oral, Daily, Manuelito Garsia MD, 400 mg at 08/19/22 0848    nebivolol (BYSTOLIC) tablet 5 mg, 5 mg, Oral, Daily, Manuelito Garsia MD, 5 mg at 08/19/22 0848    pravastatin (PRAVACHOL) tablet 40 mg, 40 mg, Oral, Daily With Darius Hammer DO, 40 mg at 08/18/22 1702    spironolactone (ALDACTONE) tablet 25 mg, 25 mg, Oral, Daily, Belkys Ferrari MD, 25 mg at 08/19/22 0848    verapamil (CALAN-SR) CR tablet 240 mg, 240 mg, Oral, HS, Belkys Ferrari MD, 240 mg at 08/18/22 2154    SKIN INTEGRITY:   no rashes, no erythema, no peripheral edema, no wounds    PRIOR LEVEL OF FUNCTION:  She lives in [facility] apartment with elevator, 8th floor apartment   [place] is  and lives alone  Self Care: Independent, Indoor Mobility: Independent and Cognition: Independent  Patient has HHA on Mondays & Thursdays for 7 5 hrs/day to assist with cooking, cleaning and transportation to medical appointments  FALLS IN THE LAST 6 MONTHS: 0    HOME ENVIRONMENT:  The living area: elevator and can live on one level  There are No steps to enter the home  The patient will not have 24 hour supervision available upon discharge  PREVIOUS DME:  Equipment in home (previous DME): Shower Chair and [facility]    FUNCTIONAL STATUS:  Physical Therapy Occupational Therapy Speech Therapy     08/17/22 1121    PT Last Visit   PT Visit Date 08/17/22   Note Type   Note Type Treatment   Pain Assessment   Pain Assessment Tool 0-10   Pain Score No Pain   Restrictions/Precautions   Weight Bearing Precautions Per Order No   Other Precautions Chair Alarm; Bed Alarm;Multiple lines;Telemetry; Fall Risk  (L hemiparesis, increased tone LUE and LE, LUE flexion tone)   General   Chart Reviewed Yes   Response to Previous Treatment Patient with no complaints from previous session     Family/Caregiver Present No   Cognition   Overall Cognitive Status WFL   Arousal/Participation Cooperative   Attention Attends with cues to redirect   Orientation Level Oriented X4   Memory Within functional limits   Following Commands Follows one step commands without difficulty   Comments very pleasant to work with   Subjective   Subjective agreeable to participate Bed Mobility   Supine to Sit 3  Moderate assistance   Additional items Assist x 1;HOB elevated; Increased time required;Verbal cues;LE management   Sit to Supine Unable to assess   Additional Comments remained seated in chair with alarm upon conclusion   Transfers   Sit to Stand 3  Moderate assistance   Additional items Assist x 1; Increased time required;Verbal cues   Stand to Sit 3  Moderate assistance   Additional items Assist x 1; Increased time required;Verbal cues   Stand pivot 3  Moderate assistance   Additional items Assist x 2; Increased time required;Verbal cues  (x2 trials to R  R HHA and 2nd therapist around waist )   Additional Comments (S)  STS transfers performed with therapist anterior and L knee block  x5 STS throughout  L arm held in fexion by pt  Balance   Static Sitting Fair -   Dynamic Sitting Poor +   Static Standing Poor   Dynamic Standing Poor -   Endurance Deficit   Endurance Deficit Yes   Endurance Deficit Description L hemiparesis, fatigue   Activity Tolerance   Activity Tolerance Patient limited by fatigue   Medical Staff Made Aware co-tx with OT, Kacey 2* medical complexity and decreased activity tolerance   Exercises   Knee AROM Long Arc Quad Sitting;10 reps;AAROM; Left  (tapping to distal quad)   Marching Sitting;10 reps;PROM; Left   Neuro re-ed standing at R HR with modAx1: lateral WS 2x10 reps  with modAx2 (1 at trunk, 1 at LLE), fwd/bwd step x1 rep with LLE   Balance training  static/dynamic sitting EOB with S/minAx1 respectfully x12 min  Assessment   Prognosis Fair   Problem List Decreased strength;Decreased endurance; Impaired balance;Decreased mobility; Impaired tone; Impaired sensation   Assessment Pt agreeable to participate in PT session  Pt performed functional mobility and therex as outlined above  Continues to demonstrate 0/5 LLE strength with MMT however able to active quads and glutes in static standing with VC/TC  Progressed to stand pivot bed>commode>chair   Pt taking step with RLE during stand pivot however unable to move LLE 2* decreased strength  Trails at HR performing lateral WS to  Promote L WB/pregait activities  modAx2 (1 at trunk for stability and 1 at LLE for foot advancement and placement)  Pt left seated in chair with chair alarm, call bell, phone, and all personal needs within reach  Pt will continue to benefit from skilled acute care PT to further address their functional mobility limitations  D/C recommendations remain rehab         08/17/22 0900    OT Last Visit   OT Visit Date 08/17/22   Note Type   Note Type Treatment   Restrictions/Precautions   Weight Bearing Precautions Per Order No   Other Precautions Telemetry; Fall Risk;Cognitive; Chair Alarm; Bed Alarm  (+left hemiparesis, +left glenohumeral subluxation)   Lifestyle   Autonomy I with ADLs and functional mobility without AD  Reciprocal Relationships Supportive son   Service to Others Retired   [address] Enjoys reading and watching tv   Pain Assessment   Pain Assessment Tool 0-10   Pain Score No Pain   ADL   Where Assessed Edge of bed  (pt performed EOB sitting for approx~10-12 minutes with CG static sitting, min a dynamic sitting balance with posterior lean and cues for spatial awareness to correct to midline sitting )   Grooming Assistance 5  Supervision/Setup   Grooming Deficit Wash/dry face   UB Bathing Assistance 4  Minimal Assistance   UB Bathing Deficit Left arm   LB Bathing Assistance 2  Maximal Assistance   LB Bathing Deficit Right lower leg including foot; Left lower leg including foot; Buttocks; Perineal area   UB Dressing Assistance 3  Moderate Assistance   UB Dressing Deficit Thread LUE;Pull around back  (to don hospital gown)   LB Dressing Assistance 2  Maximal Assistance   LB Dressing Deficit Don/doff R sock; Don/doff L sock   Toileting Assistance  3  Moderate Assistance   Toileting Deficit Perineal care   Bed Mobility   Supine to Sit 3  Moderate assistance   Additional items Assist x 1;HOB elevated; Increased time required;Verbal cues   Sit to Supine Unable to assess   Additional Comments pt in chair at end of session with all needs met and alarm engaged   Transfers   Sit to Stand 3  Moderate assistance   Additional items Assist x 1   Stand to Sit 3  Moderate assistance   Stand pivot 3  Moderate assistance   Additional items Assist x 2  (bed>commode with B/L HHA +left hemiparetic arm supported with transfer)   Additional Comments Multiple sit to stand transfers with mod a x 1 and SBA of another for safety wtih left hemparetic arm supported   Functional Mobility   Additional Comments unable to assess   Therapeutic Exercise - ROM   UE-ROM Yes   ROM- Right Upper Extremities   R Shoulder PROM; External rotation; Internal rotation; Flexion  (flexion ot 60*)   R Elbow Prolonged stretch;Elbow flexion;PROM  (tone to elbow)   R Hand Ring finger;AROM; Thumb; Index finger; Long finger;Little finger  (a)   RUE ROM Comment 1/2 width glenohumeral subluxation, high tone to elbow only, scapula gliding with shoulder IR/ER exercises  Cognition   Arousal/Participation Alert; Responsive; Cooperative   Attention Attends with cues to redirect   Orientation Level Oriented X4   Memory Within functional limits   Following Commands Follows one step commands with increased time or repetition   Comments pt motivated for therapy, Greek speaking occassional slow processing, difficulty with multistep commands possible language barrier (?) continue to assess  Activity Tolerance   Activity Tolerance Patient limited by fatigue;Patient tolerated treatment well   Medical Staff Made Aware RN   Assessment   Assessment Patient participated in Skilled OT session this date with interventions consisting of self care tasks at EOB, left UE therapeutic exercises with PROM to elbow/shoulder and  strengthening exercises, functional transfers including commode transfer    Patient agreeable to OT treatment session, upon arrival patient was found supine in bed  In comparison to previous session, patient with improvements in self care tasks   Patient requiring verbal cues for safety, verbal cues for correct technique and frequent rest periods  Patient continues to be functioning below baseline level, occupational performance remains limited secondary to factors listed above and increased risk for falls and injury  The patient's raw score on the AM-PAC Daily Activity inpatient short form is 14, standardized score is 33 39, less than 39 4  Patients at this level are likely to benefit from discharge to post-acute rehabilitation services  Please refer to the recommendation of the Occupational Therapist for safe discharge planning  From OT standpoint, recommendation at time of d/c would be Short Term Rehab  Patient to benefit from continued Occupational Therapy treatment while in the hospital to address deficits as defined above and maximize level of functional independence with ADLs and functional mobility  Speech/Language Pathology Progress Note     Patient Name: Morris Cade  RWBXF'A Date: 8/16/2022     Problem List  Principal Problem:    Arterial ischemic stroke, MCA (middle cerebral artery), right, acute ([facility] [address] )  Active Problems:    Essential hypertension    Hyperlipidemia LDL goal <100    Chronic atrial fibrillation ([facility] )    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 [facility])        Past Medical History  Medical History        Past Medical History:   Diagnosis Date    [facility]      Anemia      Arthritis      Breast pain, right      Chest pain on breathing      Colon polyp      Cough      Diverticulosis      Encounter for screening colonoscopy      H/O sick sinus syndrome      Heart murmur      High cholesterol      History of atrial fibrillation       Rate ontrolled  On xarelto 15mg (creatinine 50) Followed by Dr Katie Cespedes with Cardiology     History of weight loss       Report loose fitting clothes   Weight stable (3lbs difference)  Last colo showed small tubular adenoma x2  Breast biopsy last showed fibrocystic changes  TSH wnl  Will check cbc, bmp, spep   Hx of long term use of blood thinners      Hypertension      Irregular heart beat      Pacemaker      Preop examination      Shortness of breath      Viral bronchitis              Past Surgical History  Surgical History         Past Surgical History:   Procedure Laterality Date    BREAST BIOPSY Right 08/17/2006     ultrasound guided Percutaneous Needle Core -Benign    CATARACT EXTRACTION        CATARACT EXTRACTION W/ INTRAOCULAR LENS  IMPLANT, BILATERAL        COLONOSCOPY        COLONOSCOPY N/A 5/22/2018     Procedure: COLONOSCOPY;  Surgeon: Kacy Boo DO;  [facility]; Service: Gastroenterology    INSERT / Erlinda Lenore / Leonard Dubose        LEG SURGERY Right       as a child after a fall    MAMMO STEREOTACTIC BREAST BIOPSY RIGHT (ALL INC) Right 10/2014     benign    FL CMBND ANTERPOST COLPORRAPHY W/CYSTO N/A 3/17/2016     Procedure: COLPORRHAPHY ANTERIOR POSTERIOR ;  Surgeon: Edgardo Madrid MD;  [facility];  Service: Gynecology    FL COLONOSCOPY FLX DX W/[facility] REHABILITATION WHEN PFRMD N/A 4/4/2019     Procedure: COLONOSCOPY;  Surgeon: Kacy Boo DO;  [facility]; Service: Gastroenterology    FL CYSTOURETHROSCOPY N/A 3/17/2016     Procedure: Kia Decharli;  Surgeon: Edgardo Madrid MD;  [facility];  Service: Gynecology    FL ESOPHAGOGASTRODUODENOSCOPY TRANSORAL DIAGNOSTIC N/A 4/16/2018     Procedure: EGD AND COLONOSCOPY;  Surgeon: Kacy Boo DO;  [facility];   Service: Gastroenterology    FL LAP,SURG,COLECTOMY, PARTIAL, W/ANAST Right 1/13/2022     Procedure: Diagnostic laparoscopy, laparscopic right colectomy;  Surgeon: Hue Menon MD;  [facility];  Service: Colorectal    FL REVAGINAL PROLAPSE,SACROSP LIG N/A 3/17/2016     Procedure: COLPOPEXY VAGINAL EXTRAPERITONEAL (VEC) ANTERIOR ;  Surgeon: Claudetta Allen, MD;  [facility];  Service: Gynecology    VA SLING OPER STRES INCONTINENCE N/A 3/17/2016     Procedure: INSERTION PUBOVAGINAL SLING SINGLE INCISION ;  Surgeon: Claudetta Allen, MD;  [facility];  Service: Gynecology               Subjective:  Pt awake and alert, lunch tray at bedside  Session completed in [facility] (the territory [place] [address] [place] [address]  "I've been fighting hard and getting better!"     Current Diet:  Regular w/ thin      Objective:  Pt seen for dx dysphagia tx to assess PO tolerance across meal/larger sample  Labial seal is adequate for retrieval and oral containment  Mastication, bolus formation and transfer of all is functional  No signs of reduced oral control  Swallows appear timely  No overt s/s aspiration across meal  Education provided on strategies to optimize swallow safety and s/s dysphagia/aspiration to notify her medical team of should they arise  Pt demonstrated understanding and denied questions at this time       Assessment:  Oropharyngeal swallow skill appears GWFL     Plan/Recommendations:  Continue current diet  Frequent and thorough oral care       CARE SCORES:  Self Care:  Eatin: Setup or clean-up assistance  Oral hygiene: 04: Supervision or touching  assistance  Toilet hygiene: 03: Partial/moderate assistance  Shower/bathing self: 03: Partial/moderate assistance  Upper body dressin: Partial/moderate assistance  Lower body dressin:  Substantial/maximal assistance  Putting on/taking off footwear: 09: Not applicable  Transfers:  Roll left and right: 09: Not applicable  Sit to lyin: Not applicable  Lying to sitting on side of bed: 03: Partial/moderate assistance  Sit to stand: 03: Partial/moderate assistance  Chair/bed to chair transfer: 03: Partial/moderate assistance  Toilet transfer: 09: Not applicable  Mobility:  Walk 10 ft: 88: Not attempted due to medical conditions or safety concerns  Walk 50 ft with two turns: 80: Not attempted due to medical conditions or safety concerns  Walk 150ft: 88: Not attempted due to medical conditions or safety concerns    CURRENT GAP IN FUNCTION  Prior to Admission: Functional Status: Mobility/Ambulation: Independent with mobility prior to admission in the home on all surfaces with no assistive devices for unlimited distances  Transfers: Independent for transfers  ADL's: Patient was independent for ADL's  Patient did need assistance for IADL's and transportation to medical appointments PTA  Estimated length of stay: 10 to 14 days    Anticipated Post-Discharge Disposition/Treatment  Disposition: Return to previous home/apartment  with support from son or HHA  Outpatient Services: Physical Therapy (PT), Occupational Therapy (OT) and Speech Therapy    BARRIERS TO DISCHARGE  Weakness, Pain, Balance Difficulty, Fatigue, Home Accessibility, Caregiver Accessibility, Equipment Needs, Resource Availability and Lives Alone    INTERVENTIONS FOR DISCHARGE  Adaptive equipment, Patient/Family/Caregiver Education, Community Resources, Financial Assistance, Arrange DME needs, Home Modifcations, Medication Changes per MD , Therapy exercises, Center of balance support , Energy conservation education , Attending Provider to contact and Stroke Education/Stroke Support Group    REQUIRED THERAPY:  Patient will require PT, OT and ST 60 minutes each per day, five days per week to achieve rehab goals  REQUIRED FUNCTIONAL AND MEDICAL MANAGEMENT FOR INPATIENT REHABILITATION:  Spasticity: Baclofen will be initiated , Skin:  There are no pressure sores currently, Pain Management: Overall pain is well controlled, Deep Vein Thrombosis (DVT) Prophylaxis:  Pradaxa,   further IM management of additional medical conditions while on the ARC, pt needs PT/OT intervention, patient/family education and training, and any other needed consults PRN, nursing medication review and management of bowel/bladder function  RECOMMENDED LEVEL OF CARE:   Azar Mayorga is an 80 y o  female with h/o Afib on Xarelto, sick sinus syndrome s/p pacemaker (not MRI conditional), HTN, and HLD who presented to [facility] ED on 8/12/2022 as a stroke alert for contracted and weak L UE, flaccid L LE, left facial droop, mild dysarthria, and mild sensory deficits on the left side  Patient has Afib and takes Xarelto  Last known normal at 11 PM last night when going to bed  This morning around 6:30 AM, patient woke up and the left arm was contracted without any movement and the left leg was flaccid  She also had a left facial droop  Upon arrival to the ED, /80 on arrival  NIHSS 11   CT head shows focal area of gliosis within the high right postcentral gyrus and scattered chronic microvascular ischemic changes with more focal lucency along the anterior limb of the right internal capsule, but no acute hemorrhage  CTA head/neck shows near occlusive thrombus at the right MCA bifurcation, mild beading of the mid to distal ICAs suspicious for mild fibromuscular dysplasia, and mild atherosclerotic disease at bilateral distal carotid arteries  CT cerebral perfusion shows 8 mL mismatch  Patient was not a tPA candidate due to bleeding risk from taking Xarelto last night  Attending Neurologist discussed case with Dr Tong Willis with IR Neurosurgery, and patient is not a thrombectomy candidate  Patient was started on stroke protocol heparin gtt and admitted to the stroke pathway  Per Neurology while etiology for stroke remains unclear, but most likely due to Xarelto failure in the setting of know afib  Currently patient is now on Pradaxa for [facility] since 8/15  Additionally patient is taking pravastatin and restarting home BP medications with close hemodynamic monitoring  Patient does continue to exhibit spasticity in LUE >LLE along with L hemiparesis   PMR was consulted during patient's stay where recommendations for physiatry management for spasticity in LUE and MANAE highly encouraged  Current recommendations to begin Baclofen along with providing passive stretch daily by therapy and/or nursing team  PT/OT/ST therapies were also consulted during patient's stay with recommending patient for inpatient acute rehab  Patient is hemodynamically stable and medically cleared for discharge to the [facility]  Prior to patient's admission, she was fully independent with ADL's and functional mobility without use of assistive device Patient did need assistance with I ADL's but did have HHA for cooking/cleaning  Currently, patient is Mod A for bed mobility and Mod A x1-2 for stand pivot transfers where ambulation has not been assessed recently  Patient is currently Supervision for eating, grooming, Mod A for UB ADL's and Max A for LB ADL's  Patient is currently on a regular diet with thin liquids  Close medical management and PM&R management is recommended at this time while patient is on the [facility]  Inpatient acute rehab is recommended for patient to maximize overall strength, cognition and functional mobility upon discharge home with family support

## 2022-08-19 NOTE — QUICK NOTE
I spoke to this patient's son, Mr Katerina Davis, over the phone to provide a comprehensive clinical update  I answered all of his questions and addressed all of his concerns to the best of my ability and to his satisfaction

## 2022-08-20 PROBLEM — M54.2 NECK PAIN: Status: ACTIVE | Noted: 2022-08-20

## 2022-08-20 PROBLEM — Z91.89 AT RISK FOR ALTERED URINARY ELIMINATION: Status: ACTIVE | Noted: 2022-08-20

## 2022-08-20 PROBLEM — I25.10 CAD (CORONARY ARTERY DISEASE): Status: ACTIVE | Noted: 2022-08-20

## 2022-08-20 PROBLEM — Z00.00 HEALTHCARE MAINTENANCE: Status: ACTIVE | Noted: 2022-08-20

## 2022-08-20 PROBLEM — I38 VALVULAR HEART DISEASE: Status: ACTIVE | Noted: 2022-08-20

## 2022-08-20 PROBLEM — Z91.89 AT RISK FOR COPING DIFFICULTY: Status: ACTIVE | Noted: 2022-08-20

## 2022-08-20 LAB
ANION GAP SERPL CALCULATED.3IONS-SCNC: 6 MMOL/L (ref 4–13)
BUN SERPL-MCNC: 42 MG/DL (ref 5–25)
CALCIUM SERPL-MCNC: 9.3 MG/DL (ref 8.3–10.1)
CHLORIDE SERPL-SCNC: 104 MMOL/L (ref 96–108)
CO2 SERPL-SCNC: 23 MMOL/L (ref 21–32)
CREAT SERPL-MCNC: 1.39 MG/DL (ref 0.6–1.3)
GFR SERPL CREATININE-BSD FRML MDRD: 35 ML/MIN/1.73SQ M
GLUCOSE P FAST SERPL-MCNC: 102 MG/DL (ref 65–99)
GLUCOSE SERPL-MCNC: 102 MG/DL (ref 65–140)
MAGNESIUM SERPL-MCNC: 2.4 MG/DL (ref 1.6–2.6)
POTASSIUM SERPL-SCNC: 4.7 MMOL/L (ref 3.5–5.3)
SODIUM SERPL-SCNC: 133 MMOL/L (ref 135–147)

## 2022-08-20 PROCEDURE — 80048 BASIC METABOLIC PNL TOTAL CA: CPT | Performed by: NURSE PRACTITIONER

## 2022-08-20 PROCEDURE — 97112 NEUROMUSCULAR REEDUCATION: CPT

## 2022-08-20 PROCEDURE — 97116 GAIT TRAINING THERAPY: CPT

## 2022-08-20 PROCEDURE — 97130 THER IVNTJ EA ADDL 15 MIN: CPT

## 2022-08-20 PROCEDURE — 97129 THER IVNTJ 1ST 15 MIN: CPT

## 2022-08-20 PROCEDURE — 83735 ASSAY OF MAGNESIUM: CPT

## 2022-08-20 PROCEDURE — 92523 SPEECH SOUND LANG COMPREHEN: CPT

## 2022-08-20 PROCEDURE — 97162 PT EVAL MOD COMPLEX 30 MIN: CPT

## 2022-08-20 PROCEDURE — 97530 THERAPEUTIC ACTIVITIES: CPT

## 2022-08-20 PROCEDURE — 97167 OT EVAL HIGH COMPLEX 60 MIN: CPT

## 2022-08-20 PROCEDURE — 99232 SBSQ HOSP IP/OBS MODERATE 35: CPT | Performed by: NURSE PRACTITIONER

## 2022-08-20 PROCEDURE — 97535 SELF CARE MNGMENT TRAINING: CPT

## 2022-08-20 RX ORDER — SODIUM CHLORIDE 9 MG/ML
75 INJECTION, SOLUTION INTRAVENOUS CONTINUOUS
Status: DISCONTINUED | OUTPATIENT
Start: 2022-08-20 | End: 2022-08-20

## 2022-08-20 RX ORDER — SODIUM CHLORIDE 9 MG/ML
100 INJECTION, SOLUTION INTRAVENOUS ONCE
Status: COMPLETED | OUTPATIENT
Start: 2022-08-20 | End: 2022-08-20

## 2022-08-20 RX ORDER — BISACODYL 10 MG
10 SUPPOSITORY, RECTAL RECTAL DAILY PRN
Status: DISCONTINUED | OUTPATIENT
Start: 2022-08-20 | End: 2022-09-13 | Stop reason: HOSPADM

## 2022-08-20 RX ORDER — BACLOFEN 10 MG/1
5 TABLET ORAL 2 TIMES DAILY
Status: DISCONTINUED | OUTPATIENT
Start: 2022-08-20 | End: 2022-08-25

## 2022-08-20 RX ADMIN — NEBIVOLOL 5 MG: 5 TABLET ORAL at 09:23

## 2022-08-20 RX ADMIN — BACLOFEN 5 MG: 10 TABLET ORAL at 17:42

## 2022-08-20 RX ADMIN — BACLOFEN 5 MG: 10 TABLET ORAL at 09:27

## 2022-08-20 RX ADMIN — SODIUM CHLORIDE 100 ML/HR: 0.9 INJECTION, SOLUTION INTRAVENOUS at 10:45

## 2022-08-20 RX ADMIN — MAGNESIUM OXIDE TAB 400 MG (241.3 MG ELEMENTAL MG) 400 MG: 400 (241.3 MG) TAB at 09:21

## 2022-08-20 RX ADMIN — LOSARTAN POTASSIUM 100 MG: 50 TABLET, FILM COATED ORAL at 09:20

## 2022-08-20 RX ADMIN — DABIGATRAN ETEXILATE MESYLATE 150 MG: 150 CAPSULE ORAL at 21:11

## 2022-08-20 RX ADMIN — PRAVASTATIN SODIUM 40 MG: 40 TABLET ORAL at 16:39

## 2022-08-20 RX ADMIN — DABIGATRAN ETEXILATE MESYLATE 150 MG: 150 CAPSULE ORAL at 09:20

## 2022-08-20 NOTE — PLAN OF CARE
Problem: Prexisting or High Potential for Compromised Skin Integrity  Goal: Skin integrity is maintained or improved  Description: INTERVENTIONS:  - Identify patients at risk for skin breakdown  - Assess and monitor skin integrity  - Assess and monitor nutrition and hydration status  - Monitor labs   - Assess for incontinence   - Turn and reposition patient  - Assist with mobility/ambulation  - Relieve pressure over bony prominences  - Avoid friction and shearing  - Provide appropriate hygiene as needed including keeping skin clean and dry  - Evaluate need for skin moisturizer/barrier cream  - Collaborate with interdisciplinary team   - Patient/family teaching  - Consider wound care consult   Outcome: Progressing     Problem: Potential for Falls  Goal: Patient will remain free of falls  Description: INTERVENTIONS:  - Educate patient/family on patient safety including physical limitations  - Instruct patient to call for assistance with activity   - Consult OT/PT to assist with strengthening/mobility   - Keep Call bell within reach  - Keep bed low and locked with side rails adjusted as appropriate  - Keep care items and personal belongings within reach  - Initiate and maintain comfort rounds  - Make Fall Risk Sign visible to staff  - Offer Toileting every 2-4 Hours, in advance of need  - Initiate/Maintain alarm  - Obtain necessary fall risk management equipment:   - Apply yellow socks and bracelet for high fall risk patients  - Consider moving patient to room near nurses station  Outcome: Progressing

## 2022-08-20 NOTE — PROGRESS NOTES
ARC PT EVAL     08/20/22 1000   Patient Data   Rehab Impairment Impairment of mobility, safety, Activities of Daily Living (ADLs), and cognitive/communication skills due to Stroke:  01 1  Left Body Involvement (Right Brain)   Etiologic Diagnosis Right MCA Ischemic CVA   Date of Onset 08/12/22   Support System   Name Patient lives home alone  She has a local son and daughter in law  She also has HHA M-Thursday 7 5 hours a day to assist with ADLs and (I)ADLs   Able to provide 24 hour supervision No   Able to provide physical help? Yes   Home Setup   Type of Home Apartment  (8th floor with elevator access)   First Floor Bathroom Tub; Shower;Combo   First Floor Bathroom Accessibility Shower stool   First Floor Setup Available Yes   Baseline Information   Transportation Family/friends drive   Prior IADL Participation   Meal Preparation Other  (has HHA)   Laundry Other  (has HHA; located on the 3rd floor)   Home Cleaning Other  (has HHA)   Prior Level of Function   Self-Care 3  Independent - Patient completed the activities by him/herself, with or without an assistive device, with no assistance from a helper  Indoor-Mobility (Ambulation) 3  Independent - Patient completed the activities by him/herself, with or without an assistive device, with no assistance from a helper  Stairs 9  Not Applicable   Functional Cognition 3  Independent - Patient completed the activities by him/herself, with or without an assistive device, with no assistance from a helper  Prior Assistance Needed for Driving;Household Chores/Cleaning;Meal Preparation; Shopping   Prior Device Used Z  None of the above   Falls in the Last Year   Number of falls in the past 12 months 0   Restrictions/Precautions   Precautions Bed/chair alarms; Fall Risk;Supervision on toilet/commode  (risk for skin breakdown; high tone LUE/LE)   Braces or Orthoses Other (Comment)  (multipodus LLE; RHS LUE)   Pain Assessment   Pain Assessment Tool 0-10   Pain Score No Pain Effect of Pain on Daily Activities she denies pain at this time but did state she took medication to dec pain in L neck this AM; may consider using heat   Toilet Transfer   Reason if not Attempted Safety concerns   Toilet Transfer CARE Score 88   Transfer Bed/Chair/Wheelchair   Positioning Concerns Hemiplegia   Limitations Noted In Balance; Coordination; Endurance; Sequencing;UE Strength;LE Strength;Problem Solving   Adaptive Equipment Transfer Board;None   Type of Assistance Needed Physical assistance   Physical Assistance Level Total assistance   Comment Repeated transfers performed throughout session: slide board transfer OOB to patient's R side with max Ax1; slide board transfer from chair->mat->chair-> mat going both R and L side, performed with mod/max Ax1 and SBA of 2nd person  Sit pivot transfer also performed to patient's R side with max Ax1 and SBA of 2nd person  Will likely have more difficulty transferring via sit pivot to L; made patient Ax2 slid board transfer in room   Chair/Bed-to-Chair Transfer CARE Score 1   Roll Left and Right   Type of Assistance Needed Physical assistance   Physical Assistance Level 76% or more   Roll Left and Right CARE Score 2   Sit to Lying   Comment patient remained OOB at the end of session with pillow propping LUE, soft care cushion under buttocks (ROHO on wheelchair), and alarm activated  Set patient up with lunch at the end of session and opened necessary items     Lying to Sitting on Side of Bed   Type of Assistance Needed Physical assistance   Physical Assistance Level 51%-75%   Comment bed flat with no railing   Lying to Sitting on Side of Bed CARE Score 2   Sit to Stand   Type of Assistance Needed Physical assistance   Physical Assistance Level 76% or more   Comment Performed 1x STS in room with therapist anterior, patient retropulsive and likely experiencing extensor tone, assist needed to block feet from sliding, able to be repositioned to stand more erect with tactile facilitation  Later performed 3x pull to stands at railing with mod Ax1 and necessary tactile facilitation for improvement of posture; L tone assists with preventing L knee buckling   Sit to Stand CARE Score 2   Picking Up Object   Reason if not Attempted Safety concerns   Picking Up Object CARE Score 88   Car Transfer   Reason if not Attempted Safety concerns   Car Transfer CARE Score 88   Ambulation   Primary Mode of Locomotion Prior to Admission Walk   Distance Walked (feet) 10 ft   Assist Device   (R railing and gait belt)   Gait Pattern L hemiparesis; Improper weight shift; Step to;Narrow LUCY   Limitations Noted In Balance; Coordination; Endurance; Heel Strike;Midline Orientation;Posture; Safety; Sensation; Sequencing;Speed;Swing;Strength   Provided Assistance with: Balance;Limb Advancement;Limb Stabilization;Weight Shift;Trunk Support   Findings Patient unable to advance LLE without total assistance, difficulty weight shifting, cues provided to increase step length RLE effective, no buckling/hyperextnsion observed 2* high tone LLE  Utilized R rail, gait belt and chair follow  Will likely need some form of external assist wether TB to keep into DF or TB to fully advance  Will lkely be good BWSS candidate   Walk 10 Feet   Reason if not Attempted Safety concerns   Walk 10 Feet CARE Score 88   Walk 50 Feet with Two Turns   Reason if not Attempted Safety concerns   Walk 50 Feet with Two Turns CARE Score 88   Walk 150 Feet   Reason if not Attempted Safety concerns   Walk 150 Feet CARE Score 88   Walking 10 Feet on Uneven Surfaces   Reason if not Attempted Safety concerns   Walking 10 Feet on Uneven Surfaces CARE Score 88   Wheelchair mobility   Type of Wheelchair Used 1  Manual   Method Left upper extremity; Left lower extremity   Assistance Provided For Locking Brakes;Obstacles;Remove Leg Rest;Replace Leg Rest;Remove armrests;Replace armrests   Distance Level Surface (feet) 50 ft   Findings Will need to use sneakers for inc ease with advancement   Wheel 50 Feet with Two Turns   Type of Assistance Needed Physical assistance   Physical Assistance Level 76% or more   Comment utilized RUE only   Wheel 50 Feet with Two Turns CARE Score 2   Wheel 150 Feet   Type of Assistance Needed Physical assistance   Physical Assistance Level Total assistance   Wheel 150 Feet CARE Score 1   Curb or Single Stair   Reason if not Attempted Safety concerns   1 Step (Curb) CARE Score 88   4 Steps   Reason if not Attempted Activity not applicable   4 Steps CARE Score 9   12 Steps   Reason if not Attempted Activity not applicable   12 Steps CARE Score 9   Comprehension   QI: Comprehension 3  Usually Understands: Understands most conversations, but misses some part/intent of message  Requires cues at times to understand  Expression   QI: Expression 3  Exhibits some difficulty with expressing needs and ideas (e g , some words or finishing thoughts) or speech is not clear   RLE Assessment   RLE Assessment WFL  (4-/5; DF tightness notes)   LLE Assessment   LLE Assessment X   Tone LLE   LLE Tone Hypertonic  (L extensor tone)   RUE Assessment   RUE Assessment WFL   Tone - LUE   L Shoulder Flexion Hypertonia   L Elbow Flexion Hypertonia   L Wrist Flexion Hypertonia   Coordination   Movements are Fluid and Coordinated 0   Sensation   Propioception Partial deficits in the RLE;Partial deficits in the LLE   Cognition   Overall Cognitive Status   (basic [facility])   Arousal/Participation Alert; Cooperative   Attention Attends with cues to redirect   Orientation Level Oriented to person;Oriented to place;Oriented to situation   Comments Able to communicate effectively in English   Objective Measure   PT Measure(s) Patient educated on PT POC, goal setting and purpose of rehab   Reviewed ARC scheduling and expectations   PT Findings BP taken at the start of [id number]   Discharge [address] Retired/not working   Patient's Discharge Plan Return home with support of fmaily and HHA   Patient's Rehab Expectations "to get better  nothing is impossible"   Barriers to Discharge Home Limited Family Support;Decreased Strength;Decreased Endurance; Safety Considerations   Impressions Pt is 80 y o  female seen for PT evaluation s/p admit to [facility] on 8/19/2022 w/ Arterial ischemic stroke, MCA (middle cerebral artery), right, acute ([facility] [address] )  She presented initially to hospital with LUE/LE weakness, L facial droop, mild dysarthia, and mild sensory deficits  She was recommended for rehab when medically appropriate  Comorbidities affecting pt's physical performance at time of assessment include: HTN, HLD, chronic a-fib, GERD, PPM, valvular disease and osteopenia  Please refer to chart for additional list  PTA, pt was independent w/ all functional mobility w/ no AD, ambulates household distances and lives alone in multi level apartment with elevator access  She has HHA 4 days a week for 7 5 hours a day to assist with (I)ADLs and ADLs  Personal factors affecting pt at time of IE include: inaccessible home environment, inability to navigate level surfaces w/o external assistance, limited home support, inability to perform IADLs, inability to perform ADLs and inability to live alone  Please find objective findings from PT assessment regarding body systems outlined above with impairments and limitations including weakness, decreased ROM, impaired balance, decreased endurance, impaired coordination, gait deviations, decreased activity tolerance, decreased functional mobility tolerance, altered sensation, decreased safety awareness, fall risk, orthopedic restrictions and impaired tone  She required total A vs max Ax1 for slide board transfers and sit pivot transfers, max A for w/c mobility using RUE only, and total A for stepping at railing  She is well below her baseline and requires skilled PT intervention to maximize her function and safety   She presents as a good rehab candidate and is expected to need 3-4 weeks to achieve goals     PT Therapy Minutes   PT Time In 1000   PT Time Out 1130   PT Total Time (minutes) 90   PT Mode of treatment - Individual (minutes) 90   PT Mode of treatment - Concurrent (minutes) 0   PT Mode of treatment - Group (minutes) 0   PT Mode of treatment - Co-treat (minutes) 0   PT Mode of Treatment - Total time(minutes) 90 minutes   PT Cumulative Minutes 90   Cumulative Minutes   Cumulative therapy minutes 90

## 2022-08-20 NOTE — ASSESSMENT & PLAN NOTE
Remains resolved   Medicine consulted  Hold Lasix and Aldactone - likely decreased intake in setting of CVA

## 2022-08-20 NOTE — TREATMENT PLAN
Individualized Plan of 25 [place] Joan Walters 80 y o  female MRN: [id number]  Unit/Bed#: -01 Encounter: [id number]     PATIENT INFORMATION  ADMISSION DATE: 8/19/2022  3:22 PM TORIBIO CATEGORY:Stroke:  01 1  Left Body Involvement (Right Brain)   ADMISSION DIAGNOSIS:  Right MCA distribution ischemic infarction EXPECTED LOS: 21 days     MEDICAL/FUNCTIONAL PROGNOSIS  Pre-admit screens and post-admit evaluations reviewed and are consistent  Based on my assessment of the patient's medical conditions and current functional status, the prognosis for attaining medical and functional goals or the IRF stay is:  Good  Patient is appropriate for acute rehabilitation  Medical Goals:   Patient will be medically stable for discharge back to community setting upon completion or acute rehab program and patient/family will be able to manage medical conditions and comorbid conditions with medications and appropriate follow up upon completion of rehab program       Cardiopulmonary function: Ensure cardiopulmonary stability and optimize cardiopulmonary function not only at rest but with activity as patient's activity level significantly increases in acute rehab compared with prior to transfer in preparation for safe discharge from [facility]  Must closely and frequently monitor blood pressure and HR to ensure adequate cardiac output during ADLs and ambulation as patient is at increased risk for orthostatic hypotension/syncope and potential injury if not monitored for and managed adequately  Blood pressure management:    Frequent monitoring of blood pressure with appropriate adjustments in blood pressure medication management to optimize blood pressure control and prevent/limit renal complications  Monitoring impact of blood pressure and side-effects of blood pressure medications at rest and with activity    Spasticity/Pain management:  Pain/spasticity will improve with frequent evaluation of pain, careful adjustments in medications, frequent re-evaluation of patient's pain and medical/neurologic status to ensure optimal pain control, avoidance of potential serious and even life-threatening side-effects and drug interactions, as well as weaning pain medications as soon as possible to decrease risk of short and long-term use  Also with therapies and appropriate positioning/adaptive devices  Stroke with hemiparesis:  Intensive skilled therapies with physical therapy and occupational therapy with close oversight and management by rehab specialized physician in acute rehabilitation setting to most expeditiously and effectively improve functional mobility, transfers, upper and lower body strengthening, conditioning, balance, and gait training with appropriate assistive device  Patient will have optimal blood pressure management and blood sugar control  Prevent/decrease risk of shoulder subluxation, chronic shoulder pain, plantarflexion contracture, VTE, atelectasis, pneumonia, skin ulceration and other injury  Inpatient rehabilitation education/teaching: To be provided to patient and typically family/caregiver (if able to be identified) by all skilled therapists, rehab nursing, case management, and rehab specialized physician to ensure optimal recovery and decrease risks of complications in both acute rehabilitation setting as well as after discharge  Acute kidney failure management and blood pressure management: Frequent measurements and evaluation of urinary intake, urinary output, and labs which include BUN/Cr and electrolytes with appropriate adjustments in medication and when necessary order additional testing  Frequent monitoring of blood pressure with appropriate adjustments in blood pressure medication management to optimize blood pressure control and prevent/limit renal complications        ANTICIPATED DISCHARGE DISPOSITION AND SERVICES  COMMUNITY SETTING:    Family home versus subacute setting  ANTICIPATED FOLLOW-UP SERVICE:   Home Health Services: PT, OT     DISCIPLINE SPECIFIC PLANS:  Required Disciplines & Services: PT, OT, ST, Rehabilitation Physician, Rehabillitation Nursing, Case Management, Dietary, Psychology    REQUIRED THERAPY (expected): Therapy Hours per Day Days per Week Tx Days (Days in [facility])   Physical Therapy 1 5 21   Occupational Therapy 1 5 21   Speech/Language Therapy 1 5 14   NOTE: Additional therapy time(s) may be added as appropriate to meet patient needs and to achieve functional goals  ANTICIPATED FUNCTIONAL OUTCOMES:  ADL: Patient will be largely CGA with ADLs except possible bathing and LB dressing which may need some additional assist   Bladder/Bowel: Patient will be modified independent with bladder/bowel management upon completion of rehab program   Transfers: Patient will be min assist-CGA/CS with transfers upon completion of rehab program   Locomotion: Patient will be at or near modified independent with locomotion (wheelchair) upon completion of rehab program   Cognitive: Patient will be near prior level of cognitive function upon completion of rehab program     DISCHARGE PLANNING NEEDS  Equipment needs: To be determined at team conference  REHAB ANTICIPATED PARTICIPATION RESTRICTIONS:  Uncertain at this time  To be determined closer to discharge

## 2022-08-20 NOTE — PROGRESS NOTES
SLP TAA     08/20/22 1300   Patient Data   Rehab Impairment Impairment of mobility, safety, Activities of Daily Living (ADLs), and cognitive/communication skills due to Stroke:  01 1  Left Body Involvement (Right Brain)   Etiologic Diagnosis Right MCA Ischemic CVA   Date of Onset 08/12/22   Support System   Name Patient lives home alone  She has a local son and daughter in law  She also has HHA M-Thursday 7 5 hours a day to assist with ADLs and (I)ADLs   Able to provide 24 hour supervision No   Able to provide physical help? Yes   Baseline Information   Transportation Family/friends drive   Prior IADL Participation   Money Management Identify Money;Estimate Costs;Estimate Change;Combine Bills  (reports that her son is currently managing since her admission)   Meal Preparation Full Participation  (has HHA)   Laundry Full Participation  (has HHA also)   Home Cleaning Full Participation  (has HHA also)   Prior Level of Function   Functional Cognition 3  Independent - Patient completed the activities by him/herself, with or without an assistive device, with no assistance from a helper  Psychosocial   Psychosocial (WDL) WDL   Restrictions/Precautions   Precautions Bed/chair alarms;Cognitive; Fall Risk;Supervision on toilet/commode   Pain Assessment   Pain Assessment Tool 0-10   Pain Score No Pain   Comprehension   Auditory Basic   Findings Pt completed portions of the Informal Cognitive Linguistic Evaluation Tool  See SLP Rehab note for full details  QI: Comprehension 3  Usually Understands: Understands most conversations, but misses some part/intent of message  Requires cues at times to understand  Comprehension (FIM) 4 - Understands basic info/conversation 75-90% of time   Expression   Verbal Basic   Non-Verbal Basic   Intelligibility Sentence   Findings Pt completed portions of the Informal Cognitive Linguistic Evaluation Tool  See SLP Rehab note for full details  QI: Expression 3   Exhibits some difficulty with expressing needs and ideas (e g , some words or finishing thoughts) or speech is not clear   Expression (FIM) 4 - Expresses basic info/needs 75-90% of time   Social Interaction   Cooperation with staff;with family   Participation Individual   Behaviors observed Appropriate   Findings Pt was cooperative and participatory throughout evaluation  Social Interaction (FIM) 5 - Interacts appropriately with others 90% of time   Problem Solving   Findings Pt completed portions of the Informal Cognitive Linguistic Evaluation Tool  See SLP Rehab note for full details  Problem solving (FIM) 4 - Solves basic problems 75-89% of time   Memory   Recognize People Yes   Remember Routine Yes   Initiates Tasks Yes   Short-Term Impaired   Long Term Intact   Findings Pt completed portions of the Informal Cognitive Linguistic Evaluation Tool  See SLP Rehab note for full details  Memory (FIM) 4 - Recognizes/recalls/performs 75-89%   Cognition   Overall Cognitive Status Impaired  (but will require further assessment)   Arousal/Participation Alert; Cooperative   Attention Attends with cues to redirect   Orientation Level Oriented X4   Memory Decreased short term memory   Following Commands Follows one step commands with increased time or repetition   Comments Pt completed portions of the Informal Cognitive Linguistic Evaluation Tool  See SLP Rehab note for full details  Plan to further assessment and follow up in next session to gain additional insight into current skills  Discharge Information   Vocational Plan Retired/not working   Patient's Discharge Plan Return home with assist from family and HHA   Patient's Rehab Expectations "to get better and go back home"   Barriers to Discharge Home Decreased Cognitive Function;Decreased Strength;Decreased Endurance; Safety Considerations;Limited Family Support   Impressions Cognitive linguistic evaluation was initiated this session where pt is found to be a good rehab candidate to achieve at least supervision level for overall cognitive linguistic skills while on the acute rehab unit with anticipated discharge home with family support and continued HHA in place for portions of the day  While assessment was limited this session, current barriers which present include deficits in the following areas: decreased working memory, short term memory, problem solving, attention, comprehension, and executive functions (problem solving, reasoning, sequencing, organization of thoughts, judgement) which currently impacts safety and mobility at this time  ELOS ~ 3-4 weeks  Additionally, Stroke Education will be provided by the team in addition to sessions to maximize knowledge given stroke, including awareness/knowledge given risk factors, signs/sxs of stroke and means for prevention of further strokes in the future  At this time and based on current evaluation, pt is recommended for skilled SLP services targeting cognitive linguistic skills to maximize overall functional independence and safety and to decrease caregiver burden at time of discharge     SLP Therapy Minutes   SLP Time In 1300   SLP Time Out 1400   SLP Total Time (minutes) 60   SLP Mode of treatment - Individual (minutes) 60   SLP Mode of treatment - Concurrent (minutes) 0   SLP Mode of treatment - Group (minutes) 0   SLP Mode of treatment - Co-treat (minutes) 0   SLP Mode of Treatment - Total time(minutes) 60 minutes   SLP Cumulative Minutes 60   Cumulative Minutes   Cumulative therapy minutes 150

## 2022-08-20 NOTE — ASSESSMENT & PLAN NOTE
Per CM patient requested to have Covid booster prior to SNF  - Discussed with pt/son 9/7, 9/9 - ok with risks and injection  - Covid 19 bivalent booster given 9/9  Discharge on magnesium daily   Continue for now  Monitor level

## 2022-08-20 NOTE — H&P
PHYSICAL MEDICINE AND REHABILITATION H&P/ADMISSION NOTE  Satish iLma 80 y o  female MRN: [id number]  Unit/Bed#: -Ly Encounter: [id number]     Rehab Diagnosis: Stroke:  01 1  Left Body Involvement (Right Brain)    Etiologic Diagnosis:  Right MCA distribution ischemic infarction    History of Present Illness:   59-year-old female with a past medical history of AFib recently on Xarelto, sick sinus syndrome status status post pacemaker placement that is not MRI compatible, hypertension, hyperlipidemia, valvular disease, coronary artery disease developed dense left-sided weakness, dysarthria and presented to the hospital on 08/12/2022  She had systolic blood pressure of 200 on arrival   CT head showed focal area of gliosis within the high right post central gyrus and scattered chronic microvascular ischemic changes with more focal lucency along the anterior limb of the right internal capsule  CTA of head and neck showed near occlusive thrombus at the right MCA bifurcation, mild beating of the mid to distal ICA suspicious mild fibromuscular dysplasia and mild atherosclerotic disease at the bilateral distal carotid arteries  She was not a candidate for MRI due to pacemaker  CT cerebral perfusion study showed 8 mL of mismatch  She was not a candidate for tPA due to bleeding risk from being on Xarelto  She was not a thrombectomy candidate for neurointerventional   Patient was recommended to transition to Pradaxa  Patient was evaluated by skilled therapies and was found to have significant decline in ADLs and ambulation and [facility]       Chief complaint:   left-sided weakness     Subjective: On eval, patient reports ongoing significant dense left lower extremity weakness and moderately severe left upper extremity weakness worse proximally as well as left arm tightness  Patient notes some neck stiffness and pain    She denies shoulder pain or pain radiating down either arm or leg  She notes sensation is fairly intact  Patient reports some difficulty controlling urine earlier but better now  She feels like she would like to continue the external Holly device until she can move more  She denies constipation or bowel incontinence  Patient denies lightheadedness, visual changes, fever, chills, sweats, shortness of breath, calf pain, or other new complaints  Review of Systems: A 10 point ROS was performed; negative except as noted above  * Arterial ischemic stroke, MCA (middle cerebral artery), right, acute (HCC)  Assessment & Plan  - R MCA CVA   - CTA head/neck - near occlusive thrombus at the R MCA bifurcation, mild beading of the mid to distal ICAs suspicious for mild fibromuscular dysplasia, and mild atherosclerotic disease at bilateral distal carotid arteries  - no MRI because pacemaker not compatible    - Residual impairments on admission to [facility]: L spastic hemiparesis, bowel/bladder dysfunction, possible cog impairments (assess for other impairments during ARC course)     Secondary stroke prevention  - repeat CT angio head and neck in 3 months to re-evaluate thrombus is a outpatient  - Antithrombotic: Pradaxa (switched from Xarelto for possible failure)   - Statin  - Optimal management of blood pressure and diabetes  - Patient at increased risk for stroke particularly early in post-stroke period - monitor neuro exam closely with low threshold to repeat imaging  -  patient and if applicable caregiver on optimal stroke management  - Follow-up with neurology and PCP after d/c   - Recommend acute comprehensive interdisciplinary inpatient rehabilitation to include intensive skilled therapies (PT, OT, ST) as outlined with oversight and management by rehabilitation physician as well as inpatient rehab level nursing, case management and weekly interdisciplinary team meetings     - patient does have fair left hand and wrist strength; can use resting hand splint for a few hours during the day and overnight but to not overuse and encourage use hand and wrist  - multi Podus boot on left foot patient can breaks to potentially decrease risk of contracture/skin breakdown   - patient has spastic left upper extremity making subluxation less likely although continue subluxation precautions      Muscle spasticity  Assessment & Plan  Left upper extremity significantly (also some L neck tightness/spasms)  Modified Sada scale 3 nearing 4  Baclofen 5mg BID trial at low dose   Gentle range of motion with therapy  Patient does have movement of the hand and wrist and do want to encourage that - can still consider resting hand splint intermittently  Consider  Botox  Skilled PT OT  Optimal skin hygiene    Neck pain  Assessment & Plan  With taut muscle bands in left cervical paraspinal and trapezius areas  Gentle stretching  Baclofen 5 mg b i d    PRN APAP  Consider K-pad or other topicals    At risk for altered urinary elimination  Assessment & Plan  Patient has been using ex-cath suction device - Pure-wick PTA to Arc   - She would like to continue at least at night until mobility improves given risk of accidents and impaired hygiene  - She feels like she does have some control  - Continue just at night for now until mobility improves  - monitor for retention, incontinence, signs/symptoms of UTI  - ensure optimal bowel management   - recommend toileting program Q2-4 H during day and Q4-6H overnight   - bladder scan Q4H, monitor output and PVRs, straight cath >450 or if uncomfortable 250-449      DARRIUS (acute kidney injury) ([facility] [address] )  Assessment & Plan  Recent elevated to 1 24 yesterday  Medicine consulted  Hold Lasix and Aldactone and repeat BMP in the morning  Resume diuretics at discretion of medicine  Monitor labs and output    At risk for coping difficulty  Assessment & Plan  Supportive counseling  Psychology consult while in [id number] [facility] maintenance  Assessment & Plan  Discharged on magnesium daily   Recommend magnesium level tomorrow adjust as indicated    CAD (coronary artery disease)  Assessment & Plan  IM consulted and with overall management at their discretion during ARC course  Antithrombotic: Pradaxa  Statin  Optimal blood pressure   Outpatient Cardiology follow-up    Per IM  · "Nonobstructive  · Card cath 8/2021 done for borderline abn stress test and found 50% mid RCA/40% mid LAD = no intervention done  · Continue statin  · Was not on ASA as an OP"       Valvular heart disease  Assessment & Plan  Per IM  · "Current ECHO:  LVEF 65%, mod AR/mild AS, mod TR with severely elevated RV systolic pressure, mild-mod MR, mod LAE/mild NEO  · Euvolemic currently but will watch since CTA on admission showed pulm edema and b/l pleural effusions"  -diuretics at their discretion   -Outpatient cardiology follow-up    At risk for venous thromboembolism (VTE)  Assessment & Plan  SCDs, ambulation, and Pradaxa       Pacemaker  Assessment & Plan  Not MRI compatible    Chronic atrial fibrillation (HCC)  Assessment & Plan  IM consulted and with overall management at their discretion during ARC course  Rate control: Verapamil, Nebivolol   Antithrombotic: Pradaxa       Hyperlipidemia LDL goal <100  Assessment & Plan  Statin    Essential hypertension  Assessment & Plan  Internal medicine consulted and co-management with their service  Monitor vitals with and without activity; monitor for orthostasis  Monitor hemoglobin, electrolytes, kidney function, hydration status   Current meds:  Losartan, nebivolol, (also on verapamil)  - Lasix and Aldactone on hold        Disposition: To be determined - family home versus subacute setting    Follow-up:   PCP   Neurology   Cardiology    CODE: Level 1: Full Code    Restrictions include:   Fall precautions ---------------------------------------------------------------------------------------------------------------------      OBJECTIVE:    Physical Exam:  08/19/22 1549 97 7 °F (36 5 °C) 62 18 132/60 -- 97 % -- Lying     Vitals above reviewed on date of encounter    General: Awake, alert in NAD  HENT: NCAT, MMM  Neck: Supple, no lymphadenopathy  Respiratory: Unlabored breathing, breath sounds equal, Lungs CTA, no wheezes, rales, or rhonchi  Cardiovascular: Regular rate and rhythm, no murmurs, rubs, or gallops  Gastrointestinal: Soft, non-tender, non-distended, normoactive bowel sounds  Genitourinary: Ext-suction contreras  SkiN/MSK/Extremities:  Distal extremities appropriately warm, normal cap refill distally, no cyanosis or calf edema, no calf tenderness to palpation; taut muscle bands in the left cervical spinal region and trapezius with some tenderness to palpation without increased warmth or edema or redness  Neurologic/Psych:   MENTAL STATUS: awake, oriented to person, place, time, and situation  Affect: Euthymic   CN II-XII:  Mild left-sided weakness and some dysarthria otherwise intact   Strength/MMT:    Right  Left  Site  Right  Left  Site    5 2-  S Ab: Shoulder Abductors  5  0  HF: Hip Flexors    5 3+*  EF: Elbow Flexors  5  0 KF: Knee Flexors    5  3+* EE: Elbow Extensors  5  0  KE: Knee Extensors    5  3 WE: Wrist Extensors  5  0  DR: Dorsi Flexors    5  4  FF: Finger Flexors  5  0  PF: Plantar Flexors    5  4  HI: Hand Intrinsics  5  0  EHL: Extensor Hallucis Longus   *Limited ROM left elbow greater than left shoulder  Modified Sada scale 3 nearing 4  Sensation: largely intact to light touch  Some hyperreflexia L>R except somewhat decreased at ankles     Laboratory:    Results from last 7 days   Lab Units 08/17/22  0515 08/16/22  0457 08/15/22  0500   HEMOGLOBIN g/dL 11 8 12 0 11 9   HEMATOCRIT % 36 8 37 8 38 0   WBC Thousand/uL 9 80 6 97 9 91     Results from last 7 days   Lab Units 08/20/22  0522 08/18/22  0427 08/17/22  0515   BUN mg/dL 42* 35* 28*   POTASSIUM mmol/L 4 7 4 5 4 3   CHLORIDE mmol/L 104 107 106   CREATININE mg/dL 1 39* 1 24 1 05            Wt Readings from Last 1 Encounters:   08/19/22 63 5 kg (139 lb 15 9 oz)     Estimated body mass index is 24 8 kg/m² as calculated from the following:    Height as of this encounter: 5' 3" (1 6 m)  Weight as of this encounter: 63 5 kg (139 lb 15 9 oz)  Imaging: reviewed    Per EMR:  Past Medical History:   Past Surgical History:   Family History:   Social history:   Past Medical History:   Diagnosis Date    A-fib ([address] )     Anemia     Arthritis     Breast pain, right     Chest pain on breathing     Colon polyp     Cough     Diverticulosis     Encounter for screening colonoscopy     H/O sick sinus syndrome     Heart murmur     High cholesterol     History of atrial fibrillation     Rate ontrolled  On xarelto 15mg (creatinine 50) Followed by Dr Bibi Burnette with Cardiology     History of weight loss     Report loose fitting clothes  Weight stable (3lbs difference)  Last colo showed small tubular adenoma x2  Breast biopsy last showed fibrocystic changes  TSH wnl  Will check cbc, bmp, spep   Hx of long term use of blood thinners     Hypertension     Irregular heart beat     Pacemaker     Preop examination     Shortness of breath     Viral bronchitis     Past Surgical History:   Procedure Laterality Date    BREAST BIOPSY Right 08/17/2006    ultrasound guided Percutaneous Needle Core -Benign    CATARACT EXTRACTION      CATARACT EXTRACTION W/ INTRAOCULAR LENS  IMPLANT, BILATERAL      COLONOSCOPY      COLONOSCOPY N/A 5/22/2018    Procedure: COLONOSCOPY;  Surgeon: Orlin Monroe DO;  Location: [facility]  GI [facility];   Service: Gastroenterology    INSERT / Ramana Gomez / Macarena Garcia Right     as a child after a fall    MAMMO STEREOTACTIC BREAST BIOPSY RIGHT (ALL INC) Right 10/2014    benign    NM CMBND ANTERPOST COLPORRAPHY W/CYSTO N/A 3/17/2016    Procedure: COLPORRHAPHY ANTERIOR POSTERIOR ;  Surgeon: Jason Ling MD;  [facility];  Service: Gynecology    WY COLONOSCOPY FLX DX W/[facility] WHEN PFRMD N/A 4/4/2019    Procedure: COLONOSCOPY;  Surgeon: Felice Patel DO;  Location: [facility]; Service: Gastroenterology    WY CYSTOURETHROSCOPY N/A 3/17/2016    Procedure: CYSTOSCOPY;  Surgeon: Jason Ling MD;  [facility];  Service: Gynecology    WY ESOPHAGOGASTRODUODENOSCOPY TRANSORAL DIAGNOSTIC N/A 4/16/2018    Procedure: EGD AND COLONOSCOPY;  Surgeon: Felice Patel DO;  Location: [facility];   Service: Gastroenterology    WY LAP,SURG,COLECTOMY, PARTIAL, W/ANAST Right 1/13/2022    Procedure: Diagnostic laparoscopy, laparscopic right colectomy;  Surgeon: Efrain Jimenez MD;  Location: [facility];  Service: Colorectal    WY REVAGINAL PROLAPSE,SACROSP LIG N/A 3/17/2016    Procedure: COLPOPEXY VAGINAL EXTRAPERITONEAL (VEC) ANTERIOR ;  Surgeon: Jason Ling MD;  [facility];  Service: Gynecology    WY SLING OPER STRES INCONTINENCE N/A 3/17/2016    Procedure: INSERTION PUBOVAGINAL SLING SINGLE INCISION ;  Surgeon: Jason Ling MD;  [facility];  Service: Gynecology     Family History   Problem Relation Age of Onset    Diabetes Mother     Breast cancer Sister 48    No Known Problems Father     No Known Problems Maternal Grandmother     No Known Problems Maternal Grandfather     No Known Problems Paternal Grandmother     No Known Problems Paternal Grandfather     No Known Problems Daughter     No Known Problems Son     No Known Problems Sister     No Known Problems Sister     No Known Problems Brother     No Known Problems Brother     No Known Problems Sister     No Known Problems Paternal Aunt     No Known Problems Paternal Aunt     No Known Problems Paternal Aunt     No Known Problems Paternal Aunt       Social History     Socioeconomic History    Marital status:      Spouse name: None    Number of children: None    Years of education: None    Highest education level: None   Occupational History    Occupation: retired   Tobacco Use    Smoking status: Never Smoker    Smokeless tobacco: Never Used   Vaping Use    Vaping Use: Never used   Substance and Sexual Activity    Alcohol use: Never    Drug use: No    Sexual activity: Not Currently     Comment:    Other Topics Concern    None   Social History Narrative    Housing, household, and economic circumstances      Social Determinants of Health     Financial Resource Strain: Low Risk     Difficulty of Paying Living Expenses: Not hard at all   Food Insecurity: No Food Insecurity    Worried About Running Out of Food in the Last Year: Never true    [address] in the Last Year: Never true   Transportation Needs: No Transportation Needs    Lack of Transportation (Medical): No    Lack of Transportation (Non-Medical):  No   Physical Activity: Not on file   Stress: Not on file   Social Connections: Not on file   Intimate Partner Violence: Not on file   Housing Stability: Low Risk     Unable to Pay for Housing in the Last Year: No    Number of Places Lived in the Last Year: 1    Unstable Housing in the Last Year: No          Current Medical Diagnosis Medications Allergies   Patient Active Problem List   Diagnosis    Essential hypertension    Hyperlipidemia LDL goal <100    Stage 3 chronic kidney disease ([address] )    Osteoarthritis of both wrists    Chronic atrial fibrillation ([address] )    Tubulovillous adenoma    Gastroesophageal reflux disease without esophagitis    Pacemaker    B12 deficiency    Allergic rhinitis due to pollen    Cavus deformity of foot    Midline cystocele    Hallux abducto valgus, bilateral    Left renal artery stenosis (HCC)    Osteoarthritis of hip    Osteopenia    Pain due to onychomycosis of toenails of both feet    Left atrial enlargement  Lipoma of right upper extremity    Diverticulosis of colon    Abnormal nuclear stress test    Renal insufficiency    Arterial ischemic stroke, MCA (middle cerebral artery), right, acute (HCC)    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)    Anemia    At risk for venous thromboembolism (VTE)    DARRIUS (acute kidney injury) ([address] )    Muscle spasticity    Valvular heart disease    CAD (coronary artery disease)    At risk for altered urinary elimination    Neck pain    Healthcare maintenance    At risk for coping difficulty      Current Facility-Administered Medications:     acetaminophen (TYLENOL) tablet 650 mg, 650 mg, Oral, Q6H PRN, Aleah Sanford MD, 650 mg at 08/19/22 2014    baclofen tablet 5 mg, 5 mg, Oral, BID, Aleah Sanford MD    bisacodyl (DULCOLAX) rectal suppository 10 mg, 10 mg, Rectal, Daily PRN, Aleah Sanford MD    dabigatran etexilate (PRADAXA) capsule 150 mg, 150 mg, Oral, Q12H [address], Aleah Sanford MD, 150 mg at 08/20/22 0920    losartan (COZAAR) tablet 100 mg, 100 mg, Oral, Daily, Aleah Sanford MD, 100 mg at 08/20/22 0920    magnesium oxide (MAG-OX) tablet 400 mg, 400 mg, Oral, Daily, Aleah Sanford MD, 400 mg at 08/20/22 1241    nebivolol (BYSTOLIC) tablet 5 mg, 5 mg, Oral, Daily, Aleah Sanford MD    polyethylene glycol [facility]) packet 17 g, 17 g, Oral, Daily PRN, Aleah Sanford MD    pravastatin (PRAVACHOL) tablet 40 mg, 40 mg, Oral, Daily With Barbra Wilson MD, 40 mg at 08/19/22 1731    verapamil (CALAN-SR) CR tablet 240 mg, 240 mg, Oral, HS, Aleah Sanford MD, 240 mg at 08/19/22 2206 Allergies   Allergen Reactions    Other Itching     Bandaids    Tape [Medical Tape] Itching            Prior Level of Function and social history:    She lives in [facility] apartment with elevator, 8th floor apartment  William Winston is  and lives alone  Self Care: Independent, Indoor Mobility: Independent and Cognition: Independent   Patient has HHA on Mondays & Thursdays for 7 5 hrs/day to assist with cooking, cleaning and transportation to medical appointments        FALLS IN THE LAST 6 MONTHS: 0     HOME ENVIRONMENT:  The living area: elevator and can live on one level  There are No steps to enter the home  The patient will not have 24 hour supervision available upon discharge  Son is a  for [address] lives in the area will discuss possible assistance after discharge  Current Level of Function:    Transfers moderate assist x2  Max assist lower body bathing, lower body dressing   Moderate assist toileting and upper body dressing  Min assist upper body bathing    Potential Barriers to Discharge:  Co-morbidities (see above), new functional deficits, significant left-sided weakness, increased tone, adequate support in community setting    Tolerance for three hours of therapy per therapy day: adequate     Rehabilitation Prognosis: good       Rehabilitation Plan/Therapy Interventions: This patient will have close medical and rehabilitation oversight from a physical medicine and rehabilitation physician and if needed an internal medicine physician to manage the complexity of medical issues to optimize health, ability to participate in therapy, quality of life, and functional outcomes  This patient requires 24 hour rehabilitation nursing to address bowel and bladder management, (pain management if listed below), medication administration, positioning/skin monitoring, fall/injury prevention, and VTE prophylaxis  Physical, occupational and speech therapy: Patient requires PT and OT to improve functional mobility, transfers, upper and lower body strengthening, conditioning, balance, and gait training with appropriate assistive device  Patient also requires ST to evaluate and if appropriate treat deficits in speech, swallowing, and cognition  PT, OT, ST will be provided approximately 5 times per week for 60 minutes per day   Skilled therapists and nursing will also provide patient/family safety education and training  / will work to ensure proper communication between patient (+/- family) and staff regarding the overall rehabilitation and medical process while patient is in the acute rehabilitation center  / will work with patient (and if applicable family and community resources) to optimize safe discharge  Discharge Planning:    Estimated length of stay:   21 days  Family/Patient Goals:  Patient/family's goals: Return to previous home/apartment  Mobility Goals:   Bed Mobility: Moderate [place]  Bed to wheelchair transfer: Stand by Assist  Sit to stand: Stand by Assist  Ambulation: Moderate Assist  Stairs: Dependent  Manual wheelchair: Moderate [place]    Activities of Daily Living (ADLs) Goals:  Eating: Moderate [place]  Hygiene and Grooming: Moderate [place]  Bathing: Minimal Assist  Upper Extremity Dressing: Supervision  Diet / Swallowing: Moderate [place]  Lower Extremity Dressing: Minimal Assist  Tub/shower Transfers: Minimal Assist  Toilet Transfers: Supervision  Toileting / Hygiene: Moderate [place]    Cognition / Communication:  Return to prior level of function    Rehabilitation and discharge goals discussed with the patient and/or family  Case Managment and Social Work to review patient/family resources and to coordinate Discharge Planning  Patient and Family Education and Training:  Rehabilitation and discharge goals discussed with the patient and/or family  Patient/family education/training needs to be discussed in weekly team meeting  Other equipment: To be determined    Medical Necessity Criteria for ARC Admission:  The preadmission screen, post-admission physical evaluation, overall plan of care and admissions order demonstrate a reasonable expectation that the following criteria were met at the time of admission to the [facility]  (See "Specific areas of management and oversight in ARC setting" for additional details on medical necessity as outlined below)  1  The patient requires active and ongoing therapeutic intervention of multiple therapy disciplines (physical therapy, occupational therapy, speech-language pathology, or prosthetics/orthotics), one of which is physical or occupational therapy  2  Patient requires an intensive rehabilitation therapy program, as defined in Chapter 1, section 110 2 2 of the CMS Medicare Policy Manual  This intensive rehabilitation therapy program will consist of at least 3 hours of therapy per day at least 5 days per week or at least 15 hours of intensive rehabilitation therapy within a 7 consecutive day period, beginning with the date of admission to the [facility]  3  The patient is reasonably expected to actively participate in, and benefit significantly from, the intensive rehabilitation therapy program as defined in Chapter 1, section 110 2 2 of the CMS Medicare Policy Manual at this time of admission to the [facility]  She can reasonably be expected to make measurable improvement (that will be of practical value to improve the patients functional capacity or adaptation to impairments) as a result of the rehabilitation treatment, as defined in section 110 3, and such improvement can be expected to be made within the prescribed period of time  As noted in the CMS Medicare Policy Manual, the patient need not be expected to achieve complete independence in the domain of self-care nor be expected to return to his or her prior level of functioning in order to meet this standard  4  The patient must require physician supervision by a rehabilitation physician   As such, a rehabilitation physician will conduct face-to-face visits with the patient at least 3 days per week throughout the patients stay in the [facility] to assess the patient both medically and functionally, as well as to modify the course of treatment as needed to maximize the patients capacity to benefit from the rehabilitation process  5  The patient requires an intensive and coordinated interdisciplinary approach to providing rehabilitation, as defined in Chapter 1, section 110 2 5 of the CMS Medicare Policy Manual  This will be achieved through periodic team conferences, conducted at least once in a 7-day period, and comprising of an interdisciplinary team of medical professionals consisting of: a rehabilitation physician, registered nurse,  and/or , and a licensed/certified therapist from each therapy discipline involved in treating the patient  Changes Since Pre-admission Assessment: None -This patient's participation in rehab continues to be reasonable, necessary and appropriate  CMS Required Post-Admission Physician Evaluation Elements  History and Physical, including medical history, functional history and active comorbidities as in above text  Post-Admission Physician Evaluation:  The patient has the potential to make improvement and is in need of physical, occupational, and/or therapy services  The patient may also need nutritional services  Given the patient's complex medical condition and risk of further medical complications, rehabilitative services cannot be safely provided at a lower level of care, such as a skilled nursing facility  I have reviewed the patient's functional and medical status at the time of the preadmission screening and they are the same as on the day of this admission  I acknowledge that I have personally performed a full physical examination on this patient within 24 hours of admission  The patient demonstrated understanding the rehabilitation program and the discharge process after we discussed them       Agree in entirety: yes  Minor adaptions: none    Major changes: none    Specific areas of management and oversight in [facility] setting:    Cardiopulmonary function: Ensure cardiopulmonary stability and optimize cardiopulmonary function not only at rest but with activity as patient's activity level significantly increases in acute rehab compared with prior to transfer in preparation for safe discharge from [facility]  Must closely and frequently monitor blood pressure and HR to ensure adequate cardiac output during ADLs and ambulation as patient is at increased risk for orthostatic hypotension/syncope and potential injury if not monitored for and managed adequately  Blood pressure management:    Frequent monitoring of blood pressure with appropriate adjustments in blood pressure medication management to optimize blood pressure control and prevent/limit renal complications  Monitoring impact of blood pressure and side-effects of blood pressure medications at rest and with activity  Spasticity/Pain management:  Pain/spasticity will improve with frequent evaluation of pain, careful adjustments in medications, frequent re-evaluation of patient's pain and medical/neurologic status to ensure optimal pain control, avoidance of potential serious and even life-threatening side-effects and drug interactions, as well as weaning pain medications as soon as possible to decrease risk of short and long-term use  Also with therapies and appropriate positioning/adaptive devices  Stroke with hemiparesis:  Intensive skilled therapies with physical therapy and occupational therapy with close oversight and management by rehab specialized physician in acute rehabilitation setting to most expeditiously and effectively improve functional mobility, transfers, upper and lower body strengthening, conditioning, balance, and gait training with appropriate assistive device  Patient will have optimal blood pressure management and blood sugar control  Prevent/decrease risk of shoulder subluxation, chronic shoulder pain, plantarflexion contracture, VTE, atelectasis, pneumonia, skin ulceration and other injury      Inpatient rehabilitation education/teaching: To be provided to patient and typically family/caregiver (if able to be identified) by all skilled therapists, rehab nursing, case management, and rehab specialized physician to ensure optimal recovery and decrease risks of complications in both acute rehabilitation setting as well as after discharge  Acute kidney failure management and blood pressure management: Frequent measurements and evaluation of urinary intake, urinary output, and labs which include BUN/Cr and electrolytes with appropriate adjustments in medication and when necessary order additional testing  Frequent monitoring of blood pressure with appropriate adjustments in blood pressure medication management to optimize blood pressure control and prevent/limit renal complications  ** Please Note: Fluency Direct voice to text software may have been used in the creation of this document  **    I personally performed the required components and examined the patient myself in person on 8/19/22  Total time spent:  85 minutes (at least) with more than 50% spent counseling/coordinating care  Counseling includes extended discussion with patient (+/- family/relevant historian) re: history, symptoms, medications, functional issues, mood, medical management (which in part specifically includes management of recent CVA) and rehabilitation management plan, and disposition  Additional time spent with thorough chart review in EMR, reviewing recent medications, labs, imaging, and management plan  This was followed by formulating a comprehensive inpatient medical/rehabilitation management plan and coordinating with pertinent specialists as indicated          Erich Best MD, [facility]  Physical Medicine and Rehabilitation  Brain Injury Medicine

## 2022-08-20 NOTE — ASSESSMENT & PLAN NOTE
Internal medicine consulted and co-management with their service  Monitor vitals with and without activity; monitor for orthostasis  Monitor hemoglobin, electrolytes, kidney function, hydration status   Current meds:  Losartan, nebivolol, Verapamil 240mg daily  - Lasix and Aldactone on hold

## 2022-08-20 NOTE — RESTORATIVE TECHNICIAN NOTE
Restorative Technician Note      Patient Name: Baldemar Bird     Restorative Tech Visit Date: 8/20/2022  Note Type: Bracing, Initial consult  Patient Position Upon Consult: Other (Comment) (in the hallway with PT)  Brace Applied: Multipodous Boot  Additional Brace Ordered: Yes  Additional Brace Applied: Other (Comment) (Left resting hand splint; size M)  Patient Position When Brace Applied: Seated  Education Provided: Yes  Patient Position at End of Consult: Other (comment) (seated in wheelchair with PT)  Nurse Communication: Nurse aware of consult, application of brace    Pt in the middle of PT session upon consult; PT will fit both braces at the end of the session  Please call Mobility Coordinator at ext  [phone] or on Tiger text "SLB-PT-Restorative Tech" role in regards to bracing instruction and/or adjustment      Shima Hairston Restorative Technician

## 2022-08-20 NOTE — PROGRESS NOTES
[facility] PT EVAL- GOALS     08/20/22 1000   Rehab Team Goals   Transfer Team Goal Patient will be independent with transfers with least restrictive device upon completion of rehab program   Locomotion Team Goal Patient will be independent with locomotion with least restrictive device upon completion of rehab program   Rehab Team Interventions   PT Interventions Gait Training; Therapeutic Exercise;Neuromuscualr Reeducation;Transfer Training;Community Reintegration;Bed Mobility; Wheelchair Mobility;Modalities; Patient/Family Education   Toileting Transfer Goal   Toilet transfer Goal 06  Independent - Patient completes the activity by him/herself with no assistance from a helper  Assistive Device   (LRAD)   Status Ongoing; Target goal - three weeks; Target goal - four weeks   Intervention Balance Work;Assistive Device   PT Transfer Goal   Roll left and right Goal 06  Independent - Patient completes the activity by him/herself with no assistance from a helper  Sit to lying Goal 06  Independent - Patient completes the activity by him/herself with no assistance from a helper  Lying to sitting on side of bed Goal 06  Independent - Patient completes the activity by him/herself with no assistance from a helper  Sit to stand Goal 04  Supervision or touching assistance- [place] provides VERBAL CUES or supervision throughout activity  Chair/bed-to-chair transfer Goal 06  Independent - Patient completes the activity by him/herself with no assistance from a helper  Car Transfer Goal 04  Supervision or touching assistance- [place] provides VERBAL CUES or supervision throughout activity  Assistive Device   (LRAD)   Environment Level Surface; Well Lit; Tile Floor   Status Ongoing; Target goal - three weeks; Target goal - four weeks   Locomotion Goal   Primary discharge mode of locomotion Wheelchair   Target Walk Distance 150 ft  (NOT LIMITED)   Assist Device   (LRAD)   Gait Pattern Improvement L foot drag;L hemiparesis; Improper weight shift; Inconsistant Linda;Decreased foot clearance;Narrow LUCY   Environment Level Surface; Well Lit; Tile Floor   Walk 10 feet Goal 04  TOUCHING/ STEADYING assistance as patient completes activity  Walk 50 feet with 2 turns Goal 03  Partial/moderate assistance - [place] does less than half the effort  [place] lifts or holds trunk or limbs and provides more than half the effort  Walk 150 feet Goal 03  Partial/moderate assistance - [place] does less than half the effort  [place] lifts or holds trunk or limbs and provides more than half the effort  Walking 10 feet on uneven surface 03  Partial/moderate assistance - [place] does less than half the effort  [place] lifts or holds trunk or limbs and provides more than half the effort  Walking Goal Status Ongoing; Target goal - three weeks; Target goal - four weeks   Type of Wheelchair Used 1  Manual   Target Wheel Distance- Level 150 ft   Wheel 50 feet with 2 turns Goal 06  Independent - Patient completes the activity by him/herself with no assistance from a helper  Wheel 150 feet Goal 06  Independent - Patient completes the activity by him/herself with no assistance from a helper  Decrease Assist With Locking Brakes; Remove Leg Rest;Replace Leg Rest   Environment Level Surface; Well Lit; Tile Floor   Wheelchair Goal Status Ongoing; Target goal - three weeks; Target goal - four weeks   Stairs Goal   1 step or curb goal 03  Partial/moderate assistance - [place] does less than half the effort  [place] lifts or holds trunk or limbs and provides more than half the effort  4 steps Goal 03  Partial/moderate assistance - [place] does less than half the effort  [place] lifts or holds trunk or limbs and provides more than half the effort  12 steps Goal 03  Partial/moderate assistance - [place] does less than half the effort  [place] lifts or holds trunk or limbs and provides more than half the effort  Number of Stairs 12  (FOR NEUROE RE-ED)   Status Ongoing; Target goal - three weeks; Target goal - four weeks   Object Retrieval Goal   Picking up object Goal 03  Partial/moderate assistance - [place] does less than half the effort  [place] lifts or holds trunk or limbs and provides more than half the effort     Small Object Picked Up MARKER

## 2022-08-20 NOTE — ASSESSMENT & PLAN NOTE
- 9/13 - Neuro exam/L sided strength improving; function improving  - CT head 9/7 - Mildly improved gray-white matter differentiation right MCA territory compared to August 15  No hemorrhagic conversion, laminar necrosis, or mass lesion   - Will need more extended IP rehab course - patient will need to be higher level prior to returning to community   - R MCA CVA   - CTA head/neck - near occlusive thrombus at the R MCA bifurcation, mild beading of the mid to distal ICAs suspicious for mild fibromuscular dysplasia, and mild atherosclerotic disease at bilateral distal carotid arteries  - no MRI because pacemaker not compatible    - Residual impairments on admission to [facility]: L spastic hemiparesis, bowel/bladder dysfunction, possible cog impairments (assess for other impairments during [facility] course)     Secondary stroke prevention  - Repeat CT angio head and neck in 3 months to re-evaluate thrombus as a outpatient - obtain order/follow-up thru neurology   - Antithrombotic: Pradaxa (switched from Xarelto for possible failure)   - Statin  - Optimal management of blood pressure and diabetes  - Follow-up with neurology and PCP after d/c   - Completed acute comprehensive interdisciplinary inpatient rehabilitation include intensive skilled therapies (PT, OT, ST) as previously outlined with oversight and management by rehabilitation physician, inpatient rehab level nursing, case management and weekly interdisciplinary team meetings     - multi Podus boot on left foot patient can breaks to potentially decrease risk of contracture/skin breakdown   - patient has spastic left upper extremity making subluxation less likely although continue subluxation precautions

## 2022-08-20 NOTE — PROGRESS NOTES
08/20/22 0800   Patient Data   Rehab Impairment Impairment of mobility, safety, Activities of Daily Living (ADLs), and cognitive/communication skills due to Stroke:  01 1  Left Body Involvement (Right Brain)   Etiologic Diagnosis Right MCA Ischemic CVA   Date of Onset 08/12/22   Home Setup   Type of Home Apartment  (8 floor)   Method of Entry   (Elevator)   Number of Stairs in Home 0   First Floor Bathroom Tub; Shower;Combo   First Floor Bathroom Accessibility Shower stool   First Floor Setup Available Yes   Prior IADL Participation   Meal Preparation   (HHA)   Laundry   (HHA)   Home Cleaning   (HHA)   Prior Level of Function   Self-Care 3  Independent - Patient completed the activities by him/herself, with or without an assistive device, with no assistance from a helper  Functional Cognition 3  Independent - Patient completed the activities by him/herself, with or without an assistive device, with no assistance from a helper  Prior Assistance Needed for Household Chores/Cleaning;Driving;Meal Preparation; Shopping   Psychosocial   Psychosocial (WDL) WDL   Restrictions/Precautions   Precautions Fall Risk;Bed/chair alarms;Supervision on toilet/commode   Pain Assessment   Pain Assessment Tool 0-10   Pain Score No Pain   Oral Hygiene   Type of Assistance Needed Set-up / clean-up;Supervision   Physical Assistance Level No physical assistance   Comment Seated EOB with set up at tray table; completed with R hand   Oral Hygiene CARE Score 4   Grooming   Able To Comb/Brush Hair;Wash/Dry Face   Limitation Noted In Coordination;Strength;Problem Solving   Findings Requires setup and completes with R arm only   Shower/Bathe Self   Type of Assistance Needed Physical assistance   Physical Assistance Level 76% or more   Comment Completed bed level sponge bathe with RUE   Shower/Bathe Self CARE Score 2   Bathing   Assessed Bath Style Sponge Bath   Anticipated D/C Bath Style Sponge Bath;Tub   Able to Gather/Transport No   Able to Wash/Rinse/Dry (body part) Left Arm;R Upper Leg;L Upper Leg;Chest;Abdomen   Limitations Noted in Balance; Coordination; Endurance;Problem Solving;ROM;Safety;Strength   Positioning Supine   Dressing/Undressing Clothing   Remove UB Clothes Other  (Gown)   Don UB Clothes Pullover Shirt   Type of Assistance Needed Physical assistance   Physical Assistance Level 76% or more   Comment A to scooby over L arm, overhead and R arm   Upper Body Dressing CARE Score 2   Don LB Clothes Pants;Socks  (Brief)   Type of Assistance Needed Physical assistance   Physical Assistance Level Total assistance   Lower Body Dressing CARE Score 1   Limitations Noted In Balance; Coordination; Endurance;Problem Solving; Safety;ROM;Strength; Sequencing   Positioning In Bed   Findings In sidelying; A to pull over hips and able ro bridge slightly   Putting On/Taking Off Footwear   Reason if not Attempted Environmental limitations   Putting On/Taking Off Footwear CARE Score [address]   Type of Assistance Needed Physical assistance   Physical Assistance Level Total assistance   Comment Bed level A for ervin hygiene and clothing management   Toileting Hygiene CARE Score 1   Toilet Transfer   Reason if not Attempted Environmental limitations   Toilet Transfer CARE Score 10   Toileting   Able to [address] down no, up no  Able to Manage Clothing Closures No   Limitations Noted In Balance; Coordination;Problem Solving;ROM;UE Strength;LE Strength; Safety   Transfer Bed/Chair/Wheelchair   Reason if not Attempted Environmental limitations   Chair/Bed-to-Chair Transfer CARE Score 10   Roll Left and Right   Type of Assistance Needed Physical assistance   Physical Assistance Level 76% or more   Comment Bed rails used   Roll Left and Right CARE Score 2   Sit to Lying   Type of Assistance Needed Physical assistance   Physical Assistance Level 51%-75%   Sit to Lying CARE Score 2   Lying to Sitting on Side of Bed   Type of Assistance Needed Physical assistance   Physical Assistance Level 51%-75%   Lying to Sitting on Side of Bed CARE Score 2   Sit to Stand   Reason if not Attempted Environmental limitations   Sit to Stand CARE Score 10   Picking Up Object   Reason if not Attempted Safety concerns   Picking Up Object CARE Score 88   RUE Assessment   RUE Assessment X   Strength - RUE   R Gross Arm Strength 4/5   LUE Assessment   LUE Assessment X   AROM - LUE   L Shoulder Flexion Trace   L Shoulder ABduction Trace   L Elbow Flexion Close to full   L Elbow Extension Less than 1/4   L Wrist Flexion Less than 1/4   L Wrist Extension Less than 1/4   L Digit Flexion Full   L Digit Extension Full   PROM - LUE   L Shoulder Flexion Less than 1/4 of range   L Shoulder ABduction Less than 1/4 of range   L Elbow Extension Full   L Elbow Extension Less than 1/4 range   L Wrist Extension Close to full   L Wrist ABduction Close to full   Strength - LUE   L Shoulder Flexion 1/5   L Shoulder ABduction 1/5   L Elbow Flexion 1/5   L Elbow Extension 1/5   L Wrist Flexion 3-/5   L Wrist Extension 3-/5   L Digit Flexion 3+/5   L Digit Extension 3+/5   Tone - LUE   L Elbow Flexion Hypertonia  (Bicep)   Coordination   Movements are Fluid and Coordinated 0   Coordination and Movement Description Gross coordination deficits   Sensation   Light Touch No apparent deficits   Cognition   Overall Cognitive Status WFL   Arousal/Participation Cooperative   Attention Attends with cues to redirect   Orientation Level Oriented to situation   Memory Within functional limits   Following Commands Follows one step commands without difficulty   Therapeutic Exercise   Therapeutic Exercise/Activity Seated EOB completed self PROM of shldr and elbow; EOB 10 mins for grooming tasks   Discharge Information   Patient's Discharge Plan Home with A from son and HHA   Impressions Pt is a 81 y/o F admitted to [facility] with R MCA CVA with L hemibody currently presenting below baseline level   PTA pt independent with ADLs, txfrs and had A with IADLs from [facility]  She is currently demonstrating increased LUE tone limiting AROM, PROM and strength  Also with increased extensor tone in LLE which improved slightly upon EOB and WB  Fair sitting bal EOB, but good overall tolerance  Above deficits limiting participation in A with ADLs and txfrs as compared to baseline level  Pt motivated and willing to participate therefore recommended mod I level for ADLs and txfrs

## 2022-08-20 NOTE — PLAN OF CARE
Problem: MOBILITY - ADULT  Goal: Maintain or return to baseline ADL function  Description: INTERVENTIONS:  -  Assess patient's ability to carry out ADLs; assess patient's baseline for ADL function and identify physical deficits which impact ability to perform ADLs (bathing, care of mouth/teeth, toileting, grooming, dressing, etc )  - Assess/evaluate cause of self-care deficits   - Assess range of motion  - Assess patient's mobility; develop plan if impaired  - Assess patient's need for assistive devices and provide as appropriate  - Encourage maximum independence but intervene and supervise when necessary  - Involve family in performance of ADLs  - Assess for home care needs following discharge   - Consider OT consult to assist with ADL evaluation and planning for discharge  - Provide patient education as appropriate  Outcome: Progressing  Goal: Maintains/Returns to pre admission functional level  Description: INTERVENTIONS:  - Perform BMAT or MOVE assessment daily    - Set and communicate daily mobility goal to care team and patient/family/caregiver  - Collaborate with rehabilitation services on mobility goals if consulted  - Perform Range of Motion  times a day  - Reposition patient every  hours    - Dangle patient  times a day  - Stand patient times a day  - Ambulate patient  times a day  - Out of bed to chair times a day   - Out of bed for meals  times a day  - Out of bed for toileting  - Record patient progress and toleration of activity level   Outcome: Progressing     Problem: Prexisting or High Potential for Compromised Skin Integrity  Goal: Skin integrity is maintained or improved  Description: INTERVENTIONS:  - Identify patients at risk for skin breakdown  - Assess and monitor skin integrity  - Assess and monitor nutrition and hydration status  - Monitor labs   - Assess for incontinence   - Turn and reposition patient  - Assist with mobility/ambulation  - Relieve pressure over bony prominences  - Avoid friction and shearing  - Provide appropriate hygiene as needed including keeping skin clean and dry  - Evaluate need for skin moisturizer/barrier cream  - Collaborate with interdisciplinary team   - Patient/family teaching  - Consider wound care consult   Outcome: Progressing     Problem: Potential for Falls  Goal: Patient will remain free of falls  Description: INTERVENTIONS:  - Educate patient/family on patient safety including physical limitations  - Instruct patient to call for assistance with activity   - Consult OT/PT to assist with strengthening/mobility   - Keep Call bell within reach  - Keep bed low and locked with side rails adjusted as appropriate  - Keep care items and personal belongings within reach  - Initiate and maintain comfort rounds  - Make Fall Risk Sign visible to staff  - Offer Toileting every  Hours, in advance of need  - Initiate/Maintain alarm  - Obtain necessary fall risk management equipment:   - Apply yellow socks and bracelet for high fall risk patients  - Consider moving patient to room near nurses station  Outcome: Progressing     Problem: PAIN - ADULT  Goal: Verbalizes/displays adequate comfort level or baseline comfort level  Description: Interventions:  - Encourage patient to monitor pain and request assistance  - Assess pain using appropriate pain scale  - Administer analgesics based on type and severity of pain and evaluate response  - Implement non-pharmacological measures as appropriate and evaluate response  - Consider cultural and social influences on pain and pain management  - Notify physician/advanced practitioner if interventions unsuccessful or patient reports new pain  Outcome: Progressing     Problem: INFECTION - ADULT  Goal: Absence or prevention of progression during hospitalization  Description: INTERVENTIONS:  - Assess and monitor for signs and symptoms of infection  - Monitor lab/diagnostic results  - Monitor all insertion sites, i e  indwelling lines, tubes, and drains  - Monitor endotracheal if appropriate and nasal secretions for changes in amount and color  - Kinney appropriate cooling/warming therapies per order  - Administer medications as ordered  - Instruct and encourage patient and family to use good hand hygiene technique  - Identify and instruct in appropriate isolation precautions for identified infection/condition  Outcome: Progressing     Problem: SAFETY ADULT  Goal: Patient will remain free of falls  Description: INTERVENTIONS:  - Educate patient/family on patient safety including physical limitations  - Instruct patient to call for assistance with activity   - Consult OT/PT to assist with strengthening/mobility   - Keep Call bell within reach  - Keep bed low and locked with side rails adjusted as appropriate  - Keep care items and personal belongings within reach  - Initiate and maintain comfort rounds  - Make Fall Risk Sign visible to staff  - Offer Toileting every  Hours, in advance of need  - Initiate/Maintain alarm  - Obtain necessary fall risk management equipment:   - Apply yellow socks and bracelet for high fall risk patients  - Consider moving patient to room near nurses station  Outcome: Progressing  Goal: Maintain or return to baseline ADL function  Description: INTERVENTIONS:  -  Assess patient's ability to carry out ADLs; assess patient's baseline for ADL function and identify physical deficits which impact ability to perform ADLs (bathing, care of mouth/teeth, toileting, grooming, dressing, etc )  - Assess/evaluate cause of self-care deficits   - Assess range of motion  - Assess patient's mobility; develop plan if impaired  - Assess patient's need for assistive devices and provide as appropriate  - Encourage maximum independence but intervene and supervise when necessary  - Involve family in performance of ADLs  - Assess for home care needs following discharge   - Consider OT consult to assist with ADL evaluation and planning for discharge  - Provide patient education as appropriate  Outcome: Progressing  Goal: Maintains/Returns to pre admission functional level  Description: INTERVENTIONS:  - Perform BMAT or MOVE assessment daily    - Set and communicate daily mobility goal to care team and patient/family/caregiver  - Collaborate with rehabilitation services on mobility goals if consulted  - Perform Range of Motion  times a day  - Reposition patient every  hours    - Dangle patient  times a day  - Stand patient  times a day  - Ambulate patient  times a day  - Out of bed to chair  times a day   - Out of bed for meals  times a day  - Out of bed for toileting  - Record patient progress and toleration of activity level   Outcome: Progressing     Problem: DISCHARGE PLANNING  Goal: Discharge to home or other facility with appropriate resources  Description: INTERVENTIONS:  - Identify barriers to discharge w/patient and caregiver  - Arrange for needed discharge resources and transportation as appropriate  - Identify discharge learning needs (meds, wound care, etc )  - Arrange for interpretive services to assist at discharge as needed  - Refer to Case Management Department for coordinating discharge planning if the patient needs post-hospital services based on physician/advanced practitioner order or complex needs related to functional status, cognitive ability, or social support system  Outcome: Progressing

## 2022-08-20 NOTE — ASSESSMENT & PLAN NOTE
Improving without recent SC but still somewhat on higher side bladder volumes   - Completed Keflex for 7 day course thru 9/12 per chart review and discussion with IM (UA+, 60-60K Proteus (not technically UTI but recommended to treat recently by on-call PMR as this is stone forming organism, also in context of ongoing urinary dysfunction, recent AMS, UTI risk) - discussed also with IM   - Continue recently started flomax - continue   - Op urology follow-up if needed  - SNF can intermittently monitor bladder scans and PRN for urge to go and unable to go   - ensure optimal bowel managemen  - recommend toileting program Q2-4 H during day and Q4-6H overnight

## 2022-08-20 NOTE — ASSESSMENT & PLAN NOTE
Mood improving   Adjustment Disorder with Mixed Emotional Features per psych   Supportive counseling  If needed consider medication in future   Psychology consult while in [facility]

## 2022-08-20 NOTE — PROGRESS NOTES
Internal Medicine Progress Note  Patient: Morris Cade  Age/sex: 80 y o  female  Medical Record #: [id number]      ASSESSMENT/PLAN: (Interval History)  Morris Cade is seen and examined and management for following issues:    Acute embolic right MCA CVA  · Had near occlusion of right MCA  · Felt to be a Xarelto failure and was switched to Pradaxa  · ECHO w/o thrombus  · Continue statin     Chronic atrial fibrillation  · Sees Dr Danielle Dallas as OP  · Rates controlled  · On Pradaxa now, Xarelto stopped     PPM  · VVI  · Is not MRI conditional     Valvular disease  · Current ECHO:  LVEF 65%, mod AR/mild AS, mod TR with severely elevated RV systolic pressure, mild-mod MR, mod LAE/mild NEO  · On dry side based on bmp and physical exam  · Give 1 L NS and repeat bmp in am     HTN  · Home:  Verapamil 240mg qhs/Cozaar 472 mg qd/Bystolic 5mg qd/lasix 65KU qd/Aldactome 25mg qd  · Here:  Verapamil 240mg qhs/Cozaar 668UE qd/Bystolic 5mg qd  · OP note states has left RA stenosis but no testing in OP records or Care Everywhere     HLD  · Home:  Crestor 10mg qd = Neuro rec when discharged to reduce to 5mg qd  · Here:  Pravachol 40mg qd     Creatinine elevation  · crea 1 3  · Give NS 1 liter and repeat bmp in am  · Hold lasix/aldactone     CAD  · Nonobstructive  · Card cath 8/2021 done for borderline abn stress test and found 50% mid RCA/40% mid LAD = no intervention done  · Continue statin  · Was not on ASA as an OP         DC planning: TBD    The above assessment and plan was reviewed and updated as determined by my evaluation of the patient on 8/20/2022      Labs:   Results from last 7 days   Lab Units 08/17/22  0515 08/16/22  0457   WBC Thousand/uL 9 80 6 97   HEMOGLOBIN g/dL 11 8 12 0   HEMATOCRIT % 36 8 37 8   PLATELETS Thousands/uL 323 302     Results from last 7 days   Lab Units 08/20/22  0522 08/18/22  0427   SODIUM mmol/L 133* 136   POTASSIUM mmol/L 4 7 4 5   CHLORIDE mmol/L 104 107   CO2 mmol/L 23 22   BUN mg/dL 42* 35*   CREATININE mg/dL 1 39* 1 24   CALCIUM mg/dL 9 3 9 0                   Review of Scheduled Meds:  Current Facility-Administered Medications   Medication Dose Route Frequency Provider Last Rate    acetaminophen  650 mg Oral Q6H PRN Migdalia Stroud MD      baclofen  5 mg Oral BID Migdalia Stroud MD      bisacodyl  10 mg Rectal Daily PRN Migdalia Stroud MD      dabigatran etexilate  150 mg Oral Q12H [address] Migdalia Stroud MD      losartan  100 mg Oral Daily Migdalia Stroud MD      magnesium oxide  400 mg Oral Daily Migdalia Stroud MD      nebivolol  5 mg Oral Daily Migdalia Stroud MD      polyethylene glycol  17 g Oral Daily PRN Migdalia Stroud MD      pravastatin  40 mg Oral Daily With Mary Russell MD      sodium chloride  75 mL/hr Intravenous Continuous Nathan Day, CRNP      verapamil  240 mg Oral HS Migdalia Stroud MD         Subjective/ HPI: Patient seen and examined  Patients overnight issues or events were reviewed with nursing or staff during rounds or morning huddle session  New or overnight issues include the following:     Pt seen and examined  No overnight complaints or concerns noted    ROS:   A 10 point ROS was performed; negative except as noted above         Imaging:     No orders to display       *Labs /Radiology studies Reviewed  *Medications  reviewed and reconciled as needed  *Please refer to order section for additional ordered labs studies  *Case discussed with primary attending during morning huddle case rounds    Physical Examination:  Vitals:   Vitals:    08/19/22 2215 08/19/22 2230 08/20/22 0500 08/20/22 0930   BP: 119/57  106/51 115/76   BP Location: Right arm  Right arm    Pulse: 60  60 61   Resp: 18  16    Temp: 97 8 °F (36 6 °C)  97 6 °F (36 4 °C)    TempSrc: Oral  Oral    SpO2: 95%  96%    Weight:       Height:  5' 3" (1 6 m)         GEN: No apparent distress, interactive  NEURO: Alert and oriented x3  HEENT: Pupils are equal and reactive, EOMI, mucous membranes are moist, face asymmetrical  CV: S1 S2 irregular, no MRG, no peripheral edema noted  RESP: Lungs are clear bilaterally, no wheezes, rales or rhonchi noted, on room air, respirations easy and non labored  GI: Flat, soft non tender, non distended; +BS x4  : Voiding without difficulty  MUSC: Moves all extremities; left hemiparesis mild  SKIN: pink, warm and dry, normal turgor, no rashes, lesions      The above physical exam was reviewed and updated as determined by my evaluation of the patient on 8/20/2022  Invasive Devices  Report    Peripheral Intravenous Line  Duration           Peripheral IV 08/16/22 Right;Ventral (anterior) Forearm 4 days          Drain  Duration           External Urinary Catheter 6 days                   VTE Pharmacologic Prophylaxis: Pradaxa  Code Status: Level 1 - Full Code  Current Length of Stay: 1 day(s)      Total time spent:  30 minutes with more than 50% spent counseling/coordinating care  Counseling includes discussion with patient re: progress  and discussion with patient of his/her current medical state/information  Coordination of patient's care was performed in conjunction with primary service  Time invested included review of patient's labs, vitals, and management of their comorbidities with continued monitoring  In addition, this patient was discussed with medical team including physician and advanced extenders  The care of the patient was extensively discussed and appropriate treatment plan was formulated unique for this patient  ** Please Note:  voice to text software may have been used in the creation of this document   Although proof errors in transcription or interpretation are a potential of such software**

## 2022-08-20 NOTE — PROGRESS NOTES
Cognitive Linguistic Evaluation (Initiated)     22 1300   Pain Assessment   Pain Assessment Tool 0-10   Pain Score No Pain   Restrictions/Precautions   Precautions Bed/chair alarms;Cognitive; Fall Risk;Supervision on toilet/commode   Cognitive Linguisitic Assessments   Informal Cognitive Linguistic Assessment Tool Pt completed portions of the Informal Cognitive Linguistic Evaluation Tool  Evaluation was completed in Serbian as this is pt's primary language, utilizing the [employer] phone  The following portions were completed this date with plan to complete additional portions in follow up session:     Orientation: oriented to person, month, year, place and situation    Long Term Memory:    : +   Age: +   Marital Status and Spouse Name: + (), +   Children and Names: + + Prince Restrepo and Janusz Marcos 38 )   Address: +    Short Term Memory:   Repetition: 4/4 accurate in immediate repetition of targets   Recall after ~15 seconds: 2/4 accurate   Recall after ~1-2 minutes: 2/4 accurate   Recall after ~6 minutes: 2/4 accurate   Recall of meal: able to recall all items   Recall of visitors: accurate recall of 1/2 visitors yesterday    Working Memory: recalled 4/8 targets    Immediate Recall: recalled up to 5 digits forwards    Further assessment portions to follow  Comprehension   Auditory Basic   Findings Pt completed portions of the Informal Cognitive Linguistic Evaluation Tool  See SLP Rehab note for full details  QI: Comprehension 3  Usually Understands: Understands most conversations, but misses some part/intent of message  Requires cues at times to understand  Comprehension (FIM) 4 - Understands basic info/conversation 75-90% of time   Expression   Verbal Basic   Non-Verbal Basic   Intelligibility Sentence   Findings Pt completed portions of the Informal Cognitive Linguistic Evaluation Tool  See SLP Rehab note for full details  QI: Expression 3   Exhibits some difficulty with expressing needs and ideas (e g , some words or finishing thoughts) or speech is not clear   Expression (FIM) 4 - Expresses basic info/needs 75-90% of time   Social Interaction   Cooperation with staff;with family   Participation Individual   Behaviors observed Appropriate   Findings Pt was cooperative and participatory throughout evaluation  Social Interaction (FIM) 5 - Interacts appropriately with others 90% of time   Problem Solving   Findings Pt completed portions of the Informal Cognitive Linguistic Evaluation Tool  See SLP Rehab note for full details  Problem solving (FIM) 4 - Solves basic problems 75-89% of time   Memory   Recognize People Yes   Remember Routine Yes   Initiates Tasks Yes   Short-Term Impaired   Long Term Intact   Findings Pt completed portions of the Informal Cognitive Linguistic Evaluation Tool  See SLP Rehab note for full details  Memory (FIM) 4 - Recognizes/recalls/performs 75-89%   Memory Skills   Orientation Level Oriented X4   Speech/Language/Cognition Assessmetn   Treatment Assessment Cognitive linguistic evaluation was initiated this session, consisting of patient interview and portions of the Informal Cognitive Linguistic Evaluation Tool  Evaluation was completed in Romansh, as this is pt's primary language, utilizing the [employer] phone  Pt's daughter, Thomas Gloria, was also present throughout evaluation  Pt reports that she lives alone but does have a HHA present to assist her, however, did not mention additional details related to this as she states her HHA has returned to work elsewhere  Pt and pt's daughter indicate that pt's son and her daughter in law assist pt with driving and other tasks as needed  Pt reports that she was independent for all IADLs but that her son has now been managing her finances since her admission   During interview, pt demonstrated good insight into physical deficits, also explaining the 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 as this affects her ability to complete tasks independently at this time  Of note, pt also requested toileting during session  SLP and staff assisted pt with toileting as she is currently a max x2 from the recliner chair  During this time, pt able to follow one step commands but noted to be only mildly impulsive, suspecting due to new routines  Overall, current assessment is limited but pt is presenting with suspected mild deficits in cognitive linguistic skills to include decreased working memory, short term memory, problem solving, attention, comprehension, and executive functions  Pt was appropriate in social interaction and demonstrated fairly functional expression and basic comprehension skills, however, did benefit from occasional repetition of information to improve understanding  Pt will benefit from further evaluation of skills in order to obtain greater insight and information into current functioning  Based on current evaluation, pt is recommended for skilled SLP services during acute rehab stay in order to further assess current skills, as well as to maximize overall cognitive linguistic skills for increased level of independence and safety and to decrease caregiver burden on discharge  SLP Therapy Minutes   SLP Time In 1300   SLP Time Out 1400   SLP Total Time (minutes) 60   SLP Mode of treatment - Individual (minutes) 60   SLP Mode of treatment - Concurrent (minutes) 0   SLP Mode of treatment - Group (minutes) 0   SLP Mode of treatment - Co-treat (minutes) 0   SLP Mode of Treatment - Total time(minutes) 60 minutes   SLP Cumulative Minutes 60   Therapy Time missed   Time missed?  No

## 2022-08-20 NOTE — ASSESSMENT & PLAN NOTE
Improving   With taut muscle bands in left cervical paraspinal and trapezius areas  Gentle stretching  Hold baclofen for now with recent mild confusion    Bengay helpful  Heat not helpful  Manual therapies/modalities helpful     PRN APAP

## 2022-08-20 NOTE — ASSESSMENT & PLAN NOTE
IM consulted and with overall management at their discretion during ARC course  Rate control: Verapamil, Nebivolol   Antithrombotic: Pradaxa

## 2022-08-20 NOTE — ASSESSMENT & PLAN NOTE
IM consulted and with overall management at their discretion during ARC course  Antithrombotic: Pradaxa  Statin  Optimal blood pressure   Outpatient Cardiology follow-up    Per IM  · "Nonobstructive  · Card cath 8/2021 done for borderline abn stress test and found 50% mid RCA/40% mid LAD = no intervention done  · Continue statin  · Was not on ASA as an OP"

## 2022-08-20 NOTE — ASSESSMENT & PLAN NOTE
Per IM  · "Current ECHO:  LVEF 65%, mod AR/mild AS, mod TR with severely elevated RV systolic pressure, mild-mod MR, mod LAE/mild NEO  · Euvolemic currently but will watch since CTA on admission showed pulm edema and b/l pleural effusions"  -diuretics at their discretion   -Outpatient cardiology follow-up

## 2022-08-20 NOTE — ASSESSMENT & PLAN NOTE
Stable off baclofen   Left upper extremity significantly (also some L neck tightness/spasms)  Modified Sada scale 3 in shoulder, and 2 in her elbow  - Has flexion synergy    - Has clonus in pronators and wrist flexors        Prior PMR provider was consider IP botox injections but not currently available > will need OP follow-up PMR  Gentle range of motion with therapy  Patient does have movement of the hand and wrist and do want to encourage that - can still consider resting hand splint intermittently  Skilled PT OT  Optimal skin hygiene

## 2022-08-21 LAB
ANION GAP SERPL CALCULATED.3IONS-SCNC: 5 MMOL/L (ref 4–13)
BUN SERPL-MCNC: 33 MG/DL (ref 5–25)
CALCIUM SERPL-MCNC: 9.1 MG/DL (ref 8.3–10.1)
CHLORIDE SERPL-SCNC: 108 MMOL/L (ref 96–108)
CO2 SERPL-SCNC: 21 MMOL/L (ref 21–32)
CREAT SERPL-MCNC: 1.09 MG/DL (ref 0.6–1.3)
GFR SERPL CREATININE-BSD FRML MDRD: 47 ML/MIN/1.73SQ M
GLUCOSE P FAST SERPL-MCNC: 105 MG/DL (ref 65–99)
GLUCOSE SERPL-MCNC: 105 MG/DL (ref 65–140)
POTASSIUM SERPL-SCNC: 4.8 MMOL/L (ref 3.5–5.3)
SODIUM SERPL-SCNC: 134 MMOL/L (ref 135–147)

## 2022-08-21 PROCEDURE — 97129 THER IVNTJ 1ST 15 MIN: CPT

## 2022-08-21 PROCEDURE — 97112 NEUROMUSCULAR REEDUCATION: CPT

## 2022-08-21 PROCEDURE — 97530 THERAPEUTIC ACTIVITIES: CPT

## 2022-08-21 PROCEDURE — 99232 SBSQ HOSP IP/OBS MODERATE 35: CPT | Performed by: NURSE PRACTITIONER

## 2022-08-21 PROCEDURE — 99232 SBSQ HOSP IP/OBS MODERATE 35: CPT | Performed by: PHYSICAL MEDICINE & REHABILITATION

## 2022-08-21 PROCEDURE — 97130 THER IVNTJ EA ADDL 15 MIN: CPT

## 2022-08-21 PROCEDURE — 80048 BASIC METABOLIC PNL TOTAL CA: CPT | Performed by: NURSE PRACTITIONER

## 2022-08-21 PROCEDURE — 97110 THERAPEUTIC EXERCISES: CPT

## 2022-08-21 PROCEDURE — 97116 GAIT TRAINING THERAPY: CPT

## 2022-08-21 RX ADMIN — VERAPAMIL HYDROCHLORIDE 240 MG: 240 TABLET ORAL at 21:06

## 2022-08-21 RX ADMIN — DABIGATRAN ETEXILATE MESYLATE 150 MG: 150 CAPSULE ORAL at 21:06

## 2022-08-21 RX ADMIN — ACETAMINOPHEN 650 MG: 325 TABLET ORAL at 21:06

## 2022-08-21 RX ADMIN — BACLOFEN 5 MG: 10 TABLET ORAL at 06:00

## 2022-08-21 RX ADMIN — MAGNESIUM OXIDE TAB 400 MG (241.3 MG ELEMENTAL MG) 400 MG: 400 (241.3 MG) TAB at 08:33

## 2022-08-21 RX ADMIN — BACLOFEN 5 MG: 10 TABLET ORAL at 18:06

## 2022-08-21 RX ADMIN — LOSARTAN POTASSIUM 100 MG: 50 TABLET, FILM COATED ORAL at 08:32

## 2022-08-21 RX ADMIN — ACETAMINOPHEN 650 MG: 325 TABLET ORAL at 04:40

## 2022-08-21 RX ADMIN — NEBIVOLOL 5 MG: 5 TABLET ORAL at 08:34

## 2022-08-21 RX ADMIN — DABIGATRAN ETEXILATE MESYLATE 150 MG: 150 CAPSULE ORAL at 08:32

## 2022-08-21 RX ADMIN — PRAVASTATIN SODIUM 40 MG: 40 TABLET ORAL at 16:41

## 2022-08-21 NOTE — PROGRESS NOTES
08/21/22 1340   Pain Assessment   Pain Assessment Tool 0-10   Pain Score No Pain   Restrictions/Precautions   Precautions Bed/chair alarms;Cognitive; Fall Risk;Supervision on toilet/commode   Cognitive Linguisitic Assessments   Informal Cognitive Linguistic Assessment Tool    Informal Cognitive Linguistic Assessment Tool     Pt completed additional portions of the Informal Cognitive Linguistic Evaluation Tool  Evaluation was completed in Welsh as this is pt's primary language, utilizing the [employer] phone  The following portions were completed this date with plan to complete final portions in follow up session:      Auditory Memory: recalled 3/5 details from short story     Reasoning:   Convergent: determined the category of 2/3 trials, requiring repetition of instructions and ?ing true comprehension of task for abstract concepts targeted in final trial   Divergent: named members of given categories for 9/9 opportunities, including abstract concepts   Compare/Contrast: given word pairs, provided similarities for 2/2 trials, differences for 2/2 trials      Organization: 3/3 in determining which item does not belong form Fo4 items     Further assessment portions to follow  Comprehension   Comprehension (FIM) 4 - Understands basic info/conversation 75-90% of time   Expression   Expression (FIM) 4 - Expresses basic info/needs 75-90% of time   Social Interaction   Social Interaction (FIM) 5 - Interacts appropriately with others 90% of time   Problem Solving   Problem solving (FIM) 4 - Solves basic problems 75-89% of time   Memory   Memory (FIM) 4 - Recognizes/recalls/performs 75-89%   Speech/Language/Cognition Assessmetn   Treatment Assessment Pt participated in skilled SLP session focusing on continued cognitive linguistic evaluation utilizing the Informal Cognitive Linguistic Assessment Tool  Session was completed in pt's primary language of Welsh, utilizing the [employer] phone   Throughout both informal and structured assessment, pt is demonstrating mild deficits in [facility] recall, working memory, and problem solving  During session, pt with decreased carryover of information, however, presented with fairly functional comprehension and expression skills of basic information  Although completed in [address], also suspect a degree of language barrier impacting true understanding of pt's current skills  Will plan to look to family this week in order to gain additional info into previous skills and observations of changes in cognition and language  Plan to complete final portions of the Informal Cognitive Linguistic Assessment Tool during next session  At this time, pt is recommended for continued skilled SLP services to maximize overall cognitive linguistic skills for increased level of independence and safety and to decrease caregiver burden on discharge  SLP Therapy Minutes   SLP Time In 1340   SLP Time Out 1410   SLP Total Time (minutes) 30   SLP Mode of treatment - Individual (minutes) 30   SLP Mode of treatment - Concurrent (minutes) 0   SLP Mode of treatment - Group (minutes) 0   SLP Mode of treatment - Co-treat (minutes) 0   SLP Mode of Treatment - Total time(minutes) 30 minutes   SLP Cumulative Minutes 90   Therapy Time missed   Time missed?  No

## 2022-08-21 NOTE — PROGRESS NOTES
08/21/22 1430   Pain Assessment   Pain Assessment Tool 0-10   Pain Score No Pain   Restrictions/Precautions   Precautions Cognitive; Fall Risk;Bed/chair alarms;Supervision on toilet/commode   Braces or Orthoses Other (Comment)  (resting soft hand splint)   Bed-Chair Transfer   Type of Assistance Needed Physical assistance;Verbal cues   Physical Assistance Level Total assistance   Comment Mod Ax2 SB transfer from recliner to EOB  A from 2nd person to assist w/ equipment   Chair/Bed-to-Chair Transfer CARE Score 1   Transfer Bed/Chair/Wheelchair   Positioning Concerns Hemiplegia   Limitations Noted In Balance; Coordination;UE Strength;LE Strength; Sequencing; Endurance   Adaptive Equipment Transfer Board   Therapeutic Exercise - ROM   UE-ROM Yes   ROM - Left Upper Extremities    L Shoulder AAROM;PROM; Prolonged stretch; Flexion;ABduction; Extension; External rotation; Internal rotation   L Elbow PROM;AAROM;Prolonged stretch;Elbow extension   L Position Seated   LUE ROM Comment Increased tone in elbow upon arrival w/ flexed position while seated in chair  PROM in all planes w/ focus on tone reduction and prolonged stretch in prep for further NMR  Increased flexor tone initially during PROM  W/ continued reps pt able to tolerate elbow extension and partial shoulder flexion/exten  No complaints of pain; tolerated well   Neuromuscular Education   Weight Bearing Technique Yes   Comments Pt engaged in lateral weight shifting while sitting EOB w/ focus on WBing for tone reduction, proprioception, and improved body awareness  Pt required Mod A to initially WB through LUE forearm  Completed x5 sets of x5 reps  W/ increased time, pt able to WB requiring less assistance  Able to tolerate horizontal abduction/shoulder flexion to position laterally position forearm on bed surface w/ Min A for appropriate positioning  Pt demo G sitting balance/tolerance throughout ask    Pt then engaged in hand to target task using LUE w/ focus on functional reach, crossing midline, tone reduction, and to challenge sitting balance  Modified planes during task due to limited ROM  Pt required cues for forward flexed posture to reach target due to impaired proprioception  Able to cross midline and hit target w/ 95% accuracy  Observed w/ reduced flexor tone in elbow w/ task  Demo 1/4 range shoulder flexion   Cognition   Overall Cognitive Status Impaired   Arousal/Participation Alert; Cooperative   Attention Attends with cues to redirect   Orientation Level Oriented X4   Memory Decreased short term memory   Following Commands Follows one step commands with increased time or repetition   Additional Activities   Additional Activities Comments Pt received w/ soft resting hand splint; assessed for redness w/ nothing noted  Pt expressed minor discomfort from prolonged wear; however overall tolerated well  Expressed difficulty doffing splint when retrieving tray  Educated on call bell use for staff assistance when doffing splint   Activity Tolerance   Activity Tolerance Patient tolerated treatment well   Other Comments   Assessment son present toward end of session   Assessment   Treatment Assessment Pt participated in 60 minute skilled OT session w/ focus on LUE NMR, functional transfers, sitting balance, ROM and activity tolerance  Details regarding functional performance noted above  Pt highly motivated, effortful and cooperative throughout session  Expressed fatigue from previous PT session  Pt currently making progress w/ OT POC; remains limited by increased tone, decreased activity tolerance, decreased ROM, decreased strength/coordination, impaired balance, generalized weakness  Presents w/ slightly decreased tone during tx sessions; however remains limiting  OT will continue w/ established POC at this time to address stated goals, maximize functional potential, and ease burden of care at d/c     Prognosis Fair   Problem List Decreased strength;Decreased range of motion;Decreased endurance; Impaired balance;Decreased mobility; Decreased coordination;Decreased cognition; Impaired tone; Impaired sensation   Barriers to Discharge Inaccessible home environment;Decreased caregiver support   Plan   Treatment/Interventions ADL retraining;Functional transfer training; Therapeutic exercise; Endurance training;Cognitive reorientation;Patient/family training;Equipment eval/education; Bed mobility; Compensatory technique education   Progress Progressing toward goals   Recommendation   OT Discharge Recommendation   (TBD)   OT Therapy Minutes   OT Time In 1430   OT Time Out 1530   OT Total Time (minutes) 60   OT Mode of treatment - Individual (minutes) 60   OT Mode of treatment - Concurrent (minutes) 0   OT Mode of treatment - Group (minutes) 0   OT Mode of treatment - Co-treat (minutes) 0   OT Mode of Treatment - Total time(minutes) 60 minutes   OT Cumulative Minutes 60   Therapy Time missed   Time missed?  No

## 2022-08-21 NOTE — PROGRESS NOTES
PM&R Coverage Progress Note:    Rehabilitation Diagnosis: Right MCA ischemic CVA, left spastic hemiparesis    ASSESSMENT: Stable      PLAN:    Rehabilitation   Continue current rehabilitation plan of care to maximize function   Spasticity: moderate -severe  in left EF, however more improved with start of Baclofen and passive/active stretching  When patient looks at her left arm can extend further  May benefit from inpatient botox trial if can be approved    Medical issues   R MCA CVA: continue Pradaxa and Pravachol   HTN: stable BP managed on Losartan 443 mg daily, Bystolic 5 mg daily, Verapamil 240 mg Daily   DARRIUS:  Cr = 1 39 yesterday (from 1 24)  IVF yesterday  Improved Cr to = 1 09  Encourage oral hydration  Lasix and Aldactone remain on HOLD   Continue current medical plan of care  Appreciate IM consultants co-management  SUBJECTIVE: Patient seen face to face  Encouraged about her left arm movement and hopeful her leg follows  Denies pain  No acute issues overnight  ROS:  A ten point review of systems was completed on 08/21/22 and pertinent positives are listed in subjective section  All other systems reviewed were negative  OBJECTIVE:   /57 (BP Location: Right arm)   Pulse 66   Temp 98 °F (36 7 °C) (Oral)   Resp 18   Ht 5' 3" (1 6 m)   Wt 63 5 kg (139 lb 15 9 oz)   SpO2 98%   BMI 24 80 kg/m²     Physical Exam  Vitals and nursing note reviewed  Constitutional:       General: She is not in acute distress  HENT:      Head: Normocephalic and atraumatic  Nose: Nose normal       Mouth/Throat:      Mouth: Mucous membranes are moist    Eyes:      Conjunctiva/sclera: Conjunctivae normal    Cardiovascular:      Rate and Rhythm: Normal rate and regular rhythm  Pulses: Normal pulses  Pulmonary:      Effort: Pulmonary effort is normal       Breath sounds: Normal breath sounds  No wheezing or rales     Abdominal:      General: Bowel sounds are normal  There is no distension  Palpations: Abdomen is soft  Tenderness: There is no abdominal tenderness  Musculoskeletal:         General: No swelling  Cervical back: Neck supple  Comments: Wrist splint in place  Mod-severe flexor spasticity at EF LUE   Skin:     General: Skin is warm  Neurological:      Mental Status: She is alert and oriented to person, place, and time  Motor: Weakness (LUE: 2/5 SAB, SF, 2-/5 EE, EF, 0/5 WE,   LLE: 0/5) present     Psychiatric:         Mood and Affect: Mood normal           Lab Results   Component Value Date    WBC 9 80 08/17/2022    HGB 11 8 08/17/2022    HCT 36 8 08/17/2022    MCV 93 08/17/2022     08/17/2022     Lab Results   Component Value Date    SODIUM 134 (L) 08/21/2022    K 4 8 08/21/2022     08/21/2022    CO2 21 08/21/2022    BUN 33 (H) 08/21/2022    CREATININE 1 09 08/21/2022    GLUC 105 08/21/2022    CALCIUM 9 1 08/21/2022     Lab Results   Component Value Date    INR 1 57 (H) 08/12/2022    INR 1 86 (H) 08/12/2022    INR 2 60 (H) 10/23/2018    PROTIME 19 0 (H) 08/12/2022    PROTIME 21 7 (H) 08/12/2022    PROTIME 27 9 (H) 10/23/2018           Current Facility-Administered Medications:     acetaminophen (TYLENOL) tablet 650 mg, 650 mg, Oral, Q6H PRN, Shantell Jimenez MD, 650 mg at 08/21/22 0440    baclofen tablet 5 mg, 5 mg, Oral, BID, Shantell Jimenez MD, 5 mg at 08/21/22 0600    bisacodyl (DULCOLAX) rectal suppository 10 mg, 10 mg, Rectal, Daily PRN, Shantell Jimenez MD    dabigatran etexilate (PRADAXA) capsule 150 mg, 150 mg, Oral, Q12H [address], Shantell Jimenez MD, 150 mg at 08/21/22 9841    losartan (COZAAR) tablet 100 mg, 100 mg, Oral, Daily, Shantell Jimenez MD, 100 mg at 08/21/22 0552    magnesium oxide (MAG-OX) tablet 400 mg, 400 mg, Oral, Daily, Shantell Jimenez MD, 400 mg at 08/21/22 6577    nebivolol (BYSTOLIC) tablet 5 mg, 5 mg, Oral, Daily, Shantell Jimenez MD, 5 mg at 08/21/22 0825    polyethylene glycol (MIRALAX) packet 17 g, 17 g, Oral, Daily PRN, Mónica Merino MD    pravastatin (PRAVACHOL) tablet 40 mg, 40 mg, Oral, Daily With Dana Rey MD, 40 mg at 08/20/22 1639    verapamil (CALAN-SR) CR tablet 240 mg, 240 mg, Oral, HS, Mónica Merino MD, 240 mg at 08/19/22 2206    Past Medical History:   Diagnosis Date    A-fib ([place] [address] )     Anemia     Arthritis     Breast pain, right     Chest pain on breathing     Colon polyp     Cough     Diverticulosis     Encounter for screening colonoscopy     H/O sick sinus syndrome     Heart murmur     High cholesterol     History of atrial fibrillation     Rate ontrolled  On xarelto 15mg (creatinine 50) Followed by Dr Vaishnavi Adams with Cardiology     History of weight loss     Report loose fitting clothes  Weight stable (3lbs difference)  Last colo showed small tubular adenoma x2  Breast biopsy last showed fibrocystic changes  TSH wnl  Will check cbc, bmp, spep          Hx of long term use of blood thinners     Hypertension     Irregular heart beat     Pacemaker     Preop examination     Shortness of breath     Viral bronchitis        Patient Active Problem List    Diagnosis Date Noted    Valvular heart disease 08/20/2022    CAD (coronary artery disease) 08/20/2022    At risk for altered urinary elimination 08/20/2022    Neck pain 08/20/2022   [address] maintenance 08/20/2022    At risk for coping difficulty 08/20/2022    Anemia 08/19/2022    At risk for venous thromboembolism (VTE) 08/19/2022    DARRIUS (acute kidney injury) ([place] [address] ) 08/19/2022    Muscle spasticity 08/19/2022    Arterial ischemic stroke, MCA (middle cerebral artery), right, acute ([facility] [place] [facility] ) 08/12/2022    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 ([place] [address] ) 08/12/2022    Renal insufficiency     Abnormal nuclear stress test 08/20/2021    Diverticulosis of colon 05/20/2019    Pacemaker 02/26/2018    Tubulovillous adenoma 02/09/2018    Gastroesophageal reflux disease without esophagitis 02/09/2018    Essential hypertension 12/08/2017    Hyperlipidemia LDL goal <100 12/08/2017    Stage 3 chronic kidney disease ([facility] ) 12/08/2017    Osteoarthritis of both wrists 12/08/2017    Chronic atrial fibrillation ([facility] ) 12/08/2017    Cavus deformity of foot 06/27/2017    Hallux abducto valgus, bilateral 06/27/2017    Lipoma of right upper extremity 05/17/2017    Pain due to onychomycosis of toenails of both feet 01/20/2017    Allergic rhinitis due to pollen 10/12/2015    Midline cystocele 12/23/2014    Osteoarthritis of hip 07/17/2014    B12 deficiency 12/06/2013    Osteopenia 11/15/2013    Left renal artery stenosis ([facility] ) 08/27/2013    Left atrial enlargement 08/27/2013          Akanksha Kerns MD  PM&R    Total time spent:  30 minutes with more than 50% spent counseling/coordinating care  Counseling includes discussion with patient re: progress and discussion with patient of his/her current medical/functional state/information  Coordination of patient's care was performed in conjunction with consulting services  Time invested included review of patient's labs, vitals, and management of their comorbidities with continued monitoring  The care of the patient was extensively discussed and appropriate treatment plan was formulated unique for this patient  ** Please Note:  voice to text software may have been used in the creation of this document   Although proof errors in transcription or interpretation are a potential of such software**

## 2022-08-21 NOTE — PROGRESS NOTES
Internal Medicine Progress Note  Patient: Gold Salazar  Age/sex: 80 y o  female  Medical Record #: [id number]      ASSESSMENT/PLAN: (Interval History)  Gold Salazar is seen and examined and management for following issues:    Acute embolic right MCA CVA  · Had near occlusion of right MCA  · Felt to be a Xarelto failure and was switched to Pradaxa  · ECHO w/o thrombus  · Continue statin     Chronic atrial fibrillation  · Sees Dr Eugene Hassan as OP  · Rates controlled  · On Pradaxa now, Xarelto stopped     PPM  · VVI  · Is not MRI conditional     Valvular disease  · Current ECHO:  LVEF 65%, mod AR/mild AS, mod TR with severely elevated RV systolic pressure, mild-mod MR, mod LAE/mild NEO  · On dry side based on bmp and physical exam  · S/p 1 L NS   · Bmp improved      HTN  · Home:  Verapamil 240mg qhs/Cozaar 413 mg qd/Bystolic 5mg qd/lasix 69YK qd/Aldactome 25mg qd  · Here:  Verapamil 240mg qhs/Cozaar 117QB qd/Bystolic 5mg qd  · OP note states has left RA stenosis but no testing in OP records or Care Everywhere     HLD  · Home:  Crestor 10mg qd = Neuro rec when discharged to reduce to 5mg qd  · Here:  Pravachol 40mg qd     Creatinine elevation  · crea 1 0  · S/p NS 1 liter   · Hold lasix/aldactone  · Repeat bmp in am     CAD  · Nonobstructive  · Card cath 8/2021 done for borderline abn stress test and found 50% mid RCA/40% mid LAD = no intervention done  · Continue statin  · Was not on ASA as an OP         DC planning: TBD    The above assessment and plan was reviewed and updated as determined by my evaluation of the patient on 8/21/2022      Labs:   Results from last 7 days   Lab Units 08/17/22  0515 08/16/22  0457   WBC Thousand/uL 9 80 6 97   HEMOGLOBIN g/dL 11 8 12 0   HEMATOCRIT % 36 8 37 8   PLATELETS Thousands/uL 323 302     Results from last 7 days   Lab Units 08/21/22  0434 08/20/22  0522   SODIUM mmol/L 134* 133*   POTASSIUM mmol/L 4 8 4 7   CHLORIDE mmol/L 108 104   CO2 mmol/L 21 23   BUN mg/dL 33* 42* CREATININE mg/dL 1 09 1 39*   CALCIUM mg/dL 9 1 9 3                   Review of Scheduled Meds:  Current Facility-Administered Medications   Medication Dose Route Frequency Provider Last Rate    acetaminophen  650 mg Oral Q6H PRN Justice Rocha MD      baclofen  5 mg Oral BID Justice Rocha MD      bisacodyl  10 mg Rectal Daily PRN Justice Rocha MD      dabigatran etexilate  150 mg Oral Q12H [facility] Justice Rocha MD      losartan  100 mg Oral Daily Justice Rocha MD      magnesium oxide  400 mg Oral Daily Justice Rocha MD      nebivolol  5 mg Oral Daily Justice Rocha MD      polyethylene glycol  17 g Oral Daily PRN Justice Rocha MD      pravastatin  40 mg Oral Daily With Naty Winn MD      verapamil  240 mg Oral HS Justice Rocha MD         Subjective/ HPI: Patient seen and examined  Patients overnight issues or events were reviewed with nursing or staff during rounds or morning huddle session  New or overnight issues include the following:     Pt seen and examined  No overnight complaints or concerns noted    ROS:   A 10 point ROS was performed; negative except as noted above         Imaging:     No orders to display       *Labs /Radiology studies Reviewed  *Medications  reviewed and reconciled as needed  *Please refer to order section for additional ordered labs studies  *Case discussed with primary attending during morning huddle case rounds    Physical Examination:  Vitals:   Vitals:    08/20/22 0930 08/20/22 1530 08/20/22 2106 08/21/22 0440   BP: 115/76 118/68 104/65 141/71   BP Location:  Right arm Right arm Right arm   Pulse: 61 62 63 68   Resp:  18 19 20   Temp:  98 2 °F (36 8 °C) 98 1 °F (36 7 °C) 97 7 °F (36 5 °C)   TempSrc:  Oral Oral Oral   SpO2:  98% 96% 97%   Weight:       Height:           GEN: No apparent distress, interactive  NEURO: Alert and oriented x3; predominantly Syriac speaking  HEENT: Pupils are equal and reactive, EOMI, mucous membranes are moist, face symmetrical  CV: S1 S2 irregular, 2/6 systolic murmur, no peripheral edema noted  RESP: Lungs are clear bilaterally, no wheezes, rales or rhonchi noted, on room air, respirations easy and non labored  GI: Flat, soft non tender, non distended; +BS x4  : Voiding without difficulty  MUSC: Moves all extremities; left hemiparesis  SKIN: pink, warm and dry, normal turgor, no rashes, lesions      The above physical exam was reviewed and updated as determined by my evaluation of the patient on 8/21/2022  Invasive Devices  Report    Drain  Duration           External Urinary Catheter 7 days                   VTE Pharmacologic Prophylaxis: Pradaxa  Code Status: Level 1 - Full Code  Current Length of Stay: 2 day(s)      Total time spent:  30 minutes with more than 50% spent counseling/coordinating care  Counseling includes discussion with patient re: progress  and discussion with patient of his/her current medical state/information  Coordination of patient's care was performed in conjunction with primary service  Time invested included review of patient's labs, vitals, and management of their comorbidities with continued monitoring  In addition, this patient was discussed with medical team including physician and advanced extenders  The care of the patient was extensively discussed and appropriate treatment plan was formulated unique for this patient  ** Please Note:  voice to text software may have been used in the creation of this document   Although proof errors in transcription or interpretation are a potential of such software**

## 2022-08-21 NOTE — PROGRESS NOTES
08/21/22 1100   Pain Assessment   Pain Assessment Tool 0-10   Pain Score 5   Pain Location/Orientation Orientation: Left; Location: Neck   Pain Onset/Description Onset: Ongoing   Restrictions/Precautions   Precautions Bed/chair alarms;Cognitive; Fall Risk;Supervision on toilet/commode   Braces or Orthoses   (LUE RHS and LLE multipodus)   Cognition   Overall Cognitive Status Impaired   Arousal/Participation Alert; Cooperative   Attention Attends with cues to redirect   Subjective   Subjective Patient ready to participae in PT session  Reports not sleeping well 2* L cervical pain   Lying to Sitting on Side of Bed   Type of Assistance Needed Physical assistance   Physical Assistance Level 51%-75%   Comment assist trunk and LLE   Lying to Sitting on Side of Bed CARE Score 2   Bed-Chair Transfer   Type of Assistance Needed Physical assistance   Physical Assistance Level 51%-75%   Comment slide board transfer from bed to drop arm recliner   Chair/Bed-to-Chair Transfer CARE Score 2   Transfer Bed/Chair/Wheelchair   Limitations Noted In Balance;LE Strength;UE Strength;Problem Solving; Sequencing   Adaptive Equipment Transfer Board   Car Transfer   Reason if not Attempted Environmental limitations   Car Transfer CARE Score 10   Walk 10 Feet   Reason if not Attempted Safety concerns   Walk 10 Feet CARE Score 88   Walk 50 Feet with Two Turns   Reason if not Attempted Safety concerns   Walk 50 Feet with Two Turns CARE Score 88   Walk 150 Feet   Reason if not Attempted Safety concerns   Walk 150 Feet CARE Score 88   Walking 10 Feet on Uneven Surfaces   Reason if not Attempted Safety concerns   Walking 10 Feet on Uneven Surfaces CARE Score 88   Therapeutic Interventions   Flexibility LLE DF, hip ABD, hamstring and knee flexion stretch TERT 2  minutes each   Modalities MHP L cervical region 10 minutes- assessed skin pre and post application followed by STM to same region   Assessment   Treatment Assessment Patient seen for brief Pt treatment session focused on addressing c/o L cervical region pain, LLE tone and slide board transfers  Patient with some relief post MHP and STM and could benefit from possible use of aqua-k pad in room  Will talk with MD  She presents with slightly dec tone compared to evaluation but still significant and limiting  Did not set up with RHS or multipodus this session as patient set up for lunch upon PT departure  Patient will have 60 minute PT treatment session after lunch to focus more on neuro re-ed  She remains well below baseline and will require continued skilled PT intervention to maximize strength and function     PT Therapy Minutes   PT Time In 1100   PT Time Out 1130   PT Total Time (minutes) 30   PT Mode of treatment - Individual (minutes) 0   PT Mode of treatment - Concurrent (minutes) 0   PT Mode of treatment - Group (minutes) 0   PT Mode of treatment - Co-treat (minutes) 0   PT Mode of Treatment - Total time(minutes) 0 minutes   PT Cumulative Minutes 90

## 2022-08-21 NOTE — PROGRESS NOTES
08/21/22 1230   Pain Assessment   Effect of Pain on Daily Activities Patient reports moderate amount of discomfort in groin with use of BWSS harness  Relieved with seated rest   Patient's Stated Pain Goal No pain   Restrictions/Precautions   Precautions Bed/chair alarms;Cognitive; Fall Risk;Supervision on toilet/commode   Braces or Orthoses Other (Comment)   Cognition   Overall Cognitive Status Impaired   Arousal/Participation Alert; Cooperative   Attention Attends with cues to redirect   Subjective   Subjective Patient ready to participate in PT session  Requesting to use the BR at the start of session   Sit to Stand   Type of Assistance Needed Physical assistance   Physical Assistance Level 76% or more   Comment Performed STS 1x with therapist anterior at max A- level of assist needed 2* extensor tone/patient pushing back  Performed 2x STS via pull to stand at bed railing with minAx1 which was helpful in facilitating anterior weight shift  Sit to Stand CARE Score 2   Bed-Chair Transfer   Type of Assistance Needed Physical assistance   Physical Assistance Level 51%-75%   Comment Performed 2x sit pivot transfers with patient going to patient's R side with her pulling on arm rest/railing   Chair/Bed-to-Chair Transfer CARE Score 2   Transfer Bed/Chair/Wheelchair   Limitations Noted In Balance; Coordination;Problem Solving; Sequencing;LE Strength;UE Strength; Endurance   Car Transfer   Reason if not Attempted Environmental limitations   Car Transfer CARE Score 10   Walk 50 Feet with Two Turns   Reason if not Attempted Safety concerns   Walk 50 Feet with Two Turns CARE Score 88   Walk 150 Feet   Reason if not Attempted Safety concerns   Walk 150 Feet CARE Score 88   Walking 10 Feet on Uneven Surfaces   Reason if not Attempted Safety concerns   Walking 10 Feet on Uneven Surfaces CARE Score 88   Ambulation   Findings Performed with BWSS- see below   Wheel 50 Feet with Two Turns   Type of Assistance Needed Physical assistance   Physical Assistance Level 51%-75%   Comment used sneaker RLE however did not make great difference in assisting with self propulsion or directional changes   Wheel 50 Feet with Two Turns CARE Score 2   Wheelchair mobility   Does the patient use a wheelchair? 1  Yes   Type of Wheelchair Used 1  Manual   Method Left upper extremity; Left lower extremity   Assistance Provided For Remove Leg Rest;Replace Leg Rest;Remove armrests;Replace armrests; Locking Brakes;Obstacles   Distance Level Surface (feet) 50 ft   Curb or Single Stair   Reason if not Attempted Safety concerns   1 Step (Curb) CARE Score 88   4 Steps   Reason if not Attempted Activity not applicable   4 Steps CARE Score 9   12 Steps   Reason if not Attempted Activity not applicable   12 Steps CARE Score 9   Picking Up Object   Reason if not Attempted Safety concerns   Picking Up Object CARE Score 88   Toilet Transfer   Type of Assistance Needed Physical assistance   Physical Assistance Level Total assistance   Comment pull to stand with minAx1 for stand and 2nd person for swap out; 2nd person also assisting with CM and hygiene   Toilet Transfer CARE Score 1   Therapeutic Interventions   Strengthening 3x STS with focus on pushing up from chair thenreaching for hallway railing for sit and reaching back for arm rst on chair for sit   Equipment Use   Body Weight Support System 90'x3 with R HHA provided by therapist and assist with advancement of LLE  Trialed use of black TB flexion wrap assist to allow for inc DF clearance and for therapist hand placement (would have trialed pulling LLE via carabinner but did sneakers do not have laces); band however also pulled patient into external rotation; recommend trialing other methods as well for LLE advancement   Assessment   Treatment Assessment Patient engaged in 60 mintue PT treatment session focused on transfers and increasing step count with use of BWSS   BWSS effective in achieving a total of 270' which is much greater than she was able to accomplish overground with use of railing  Need to continue trailing equipment (brace vs TB vs?) to clear and assist with advancing LLE  She did c/o groin pain with activity from straps however this was relieved with seated rest  Will recommend continued use of BWSS, use of NuStep, and continued incorporating stretch and strengthening in order for patient to maximize her functional strength and (I)  Problem List Decreased strength;Decreased endurance; Impaired balance;Decreased mobility; Decreased range of motion;Decreased coordination;Decreased cognition; Impaired sensation;Pain;Orthopedic restrictions   Barriers to Discharge Inaccessible home environment;Decreased caregiver support   PT Barriers   Functional Limitation Car transfers; Ramp negotiation;Stair negotiation;Standing;Transfers; Walking; Wheelchair management   Plan   Treatment/Interventions Functional transfer training;LE strengthening/ROM; Therapeutic exercise; Endurance training;Cognitive reorientation;Patient/family training;Equipment eval/education;Gait training   Progress Progressing toward goals   PT Therapy Minutes   PT Time In 1230   PT Time Out 1330   PT Total Time (minutes) 60   PT Mode of treatment - Individual (minutes) 60   PT Mode of treatment - Concurrent (minutes) 0   PT Mode of treatment - Group (minutes) 0   PT Mode of treatment - Co-treat (minutes) 0   PT Mode of Treatment - Total time(minutes) 60 minutes   PT Cumulative Minutes 150

## 2022-08-22 LAB
ANION GAP SERPL CALCULATED.3IONS-SCNC: 4 MMOL/L (ref 4–13)
BASOPHILS # BLD AUTO: 0.05 THOUSANDS/ΜL (ref 0–0.1)
BASOPHILS NFR BLD AUTO: 1 % (ref 0–1)
BUN SERPL-MCNC: 31 MG/DL (ref 5–25)
CALCIUM SERPL-MCNC: 9.3 MG/DL (ref 8.3–10.1)
CHLORIDE SERPL-SCNC: 108 MMOL/L (ref 96–108)
CO2 SERPL-SCNC: 22 MMOL/L (ref 21–32)
CREAT SERPL-MCNC: 1.06 MG/DL (ref 0.6–1.3)
EOSINOPHIL # BLD AUTO: 0.08 THOUSAND/ΜL (ref 0–0.61)
EOSINOPHIL NFR BLD AUTO: 1 % (ref 0–6)
ERYTHROCYTE [DISTWIDTH] IN BLOOD BY AUTOMATED COUNT: 14.1 % (ref 11.6–15.1)
GFR SERPL CREATININE-BSD FRML MDRD: 49 ML/MIN/1.73SQ M
GLUCOSE P FAST SERPL-MCNC: 101 MG/DL (ref 65–99)
GLUCOSE SERPL-MCNC: 101 MG/DL (ref 65–140)
HCT VFR BLD AUTO: 38.6 % (ref 34.8–46.1)
HGB BLD-MCNC: 12 G/DL (ref 11.5–15.4)
IMM GRANULOCYTES # BLD AUTO: 0.09 THOUSAND/UL (ref 0–0.2)
IMM GRANULOCYTES NFR BLD AUTO: 1 % (ref 0–2)
LYMPHOCYTES # BLD AUTO: 1.55 THOUSANDS/ΜL (ref 0.6–4.47)
LYMPHOCYTES NFR BLD AUTO: 21 % (ref 14–44)
MCH RBC QN AUTO: 29.4 PG (ref 26.8–34.3)
MCHC RBC AUTO-ENTMCNC: 31.1 G/DL (ref 31.4–37.4)
MCV RBC AUTO: 95 FL (ref 82–98)
MONOCYTES # BLD AUTO: 0.73 THOUSAND/ΜL (ref 0.17–1.22)
MONOCYTES NFR BLD AUTO: 10 % (ref 4–12)
NEUTROPHILS # BLD AUTO: 4.83 THOUSANDS/ΜL (ref 1.85–7.62)
NEUTS SEG NFR BLD AUTO: 66 % (ref 43–75)
NRBC BLD AUTO-RTO: 0 /100 WBCS
PLATELET # BLD AUTO: 360 THOUSANDS/UL (ref 149–390)
PMV BLD AUTO: 10.6 FL (ref 8.9–12.7)
POTASSIUM SERPL-SCNC: 4.7 MMOL/L (ref 3.5–5.3)
RBC # BLD AUTO: 4.08 MILLION/UL (ref 3.81–5.12)
SODIUM SERPL-SCNC: 134 MMOL/L (ref 135–147)
WBC # BLD AUTO: 7.33 THOUSAND/UL (ref 4.31–10.16)

## 2022-08-22 PROCEDURE — 85025 COMPLETE CBC W/AUTO DIFF WBC: CPT | Performed by: NURSE PRACTITIONER

## 2022-08-22 PROCEDURE — 97535 SELF CARE MNGMENT TRAINING: CPT

## 2022-08-22 PROCEDURE — 99233 SBSQ HOSP IP/OBS HIGH 50: CPT | Performed by: PHYSICAL MEDICINE & REHABILITATION

## 2022-08-22 PROCEDURE — 97530 THERAPEUTIC ACTIVITIES: CPT

## 2022-08-22 PROCEDURE — 97112 NEUROMUSCULAR REEDUCATION: CPT

## 2022-08-22 PROCEDURE — 80048 BASIC METABOLIC PNL TOTAL CA: CPT | Performed by: NURSE PRACTITIONER

## 2022-08-22 PROCEDURE — 99232 SBSQ HOSP IP/OBS MODERATE 35: CPT | Performed by: INTERNAL MEDICINE

## 2022-08-22 RX ADMIN — LOSARTAN POTASSIUM 100 MG: 50 TABLET, FILM COATED ORAL at 08:21

## 2022-08-22 RX ADMIN — MAGNESIUM OXIDE TAB 400 MG (241.3 MG ELEMENTAL MG) 400 MG: 400 (241.3 MG) TAB at 08:21

## 2022-08-22 RX ADMIN — BACLOFEN 5 MG: 10 TABLET ORAL at 06:31

## 2022-08-22 RX ADMIN — ACETAMINOPHEN 650 MG: 325 TABLET ORAL at 04:59

## 2022-08-22 RX ADMIN — VERAPAMIL HYDROCHLORIDE 240 MG: 240 TABLET ORAL at 21:45

## 2022-08-22 RX ADMIN — BACLOFEN 5 MG: 10 TABLET ORAL at 17:23

## 2022-08-22 RX ADMIN — DABIGATRAN ETEXILATE MESYLATE 150 MG: 150 CAPSULE ORAL at 08:21

## 2022-08-22 RX ADMIN — DABIGATRAN ETEXILATE MESYLATE 150 MG: 150 CAPSULE ORAL at 21:45

## 2022-08-22 RX ADMIN — PRAVASTATIN SODIUM 40 MG: 40 TABLET ORAL at 17:23

## 2022-08-22 RX ADMIN — NEBIVOLOL 5 MG: 5 TABLET ORAL at 08:21

## 2022-08-22 NOTE — PROGRESS NOTES
08/22/22 0830   Pain Assessment   Pain Assessment Tool 0-10   Pain Score 6   Pain Location/Orientation Orientation: Left; Location: Shoulder   Pain Onset/Description Onset: Ongoing;Frequency: Intermittent; Descriptor: Aching  (reports overnight pain)   Patient's Stated Pain Goal No pain   Hospital Pain Intervention(s) Rest;Repositioned   Restrictions/Precautions   Precautions Bed/chair alarms;Cognitive; Fall Risk;Pain;Supervision on toilet/commode  (language barrier)   Weight Bearing Restrictions No   ROM Restrictions No   Cognition   Overall Cognitive Status Impaired   Arousal/Participation Alert; Cooperative   Attention Attends with cues to redirect   Orientation Level Oriented X4   Memory Decreased short term memory   Following Commands Follows one step commands with increased time or repetition   Lying to Sitting on Side of Bed   Type of Assistance Needed Physical assistance;Verbal cues   Physical Assistance Level 51%-75%   Lying to Sitting on Side of Bed CARE Score 2   Sit to Stand   Type of Assistance Needed Physical assistance;Verbal cues   Physical Assistance Level 51%-75%   Comment varied MOD/MAXA with MAX VC' s for sequencing and posisioning, retropulsion present 2/2 rigidity     Sit to Stand CARE Score 2   Bed-Chair Transfer   Type of Assistance Needed Physical assistance;Verbal cues   Physical Assistance Level 51%-75%   Comment sit pivot transfers to R   Chair/Bed-to-Chair Transfer CARE Score 2   Car Transfer   Reason if not Attempted Environmental limitations   Car Transfer CARE Score 10   Walk 10 Feet   Reason if not Attempted Safety concerns   Walk 10 Feet CARE Score 88   Walk 50 Feet with Two Turns   Reason if not Attempted Safety concerns   Walk 50 Feet with Two Turns CARE Score 88   Walking 10 Feet on Uneven Surfaces   Reason if not Attempted Safety concerns   Walking 10 Feet on Uneven Surfaces CARE Score 88   Ambulation   Primary Mode of Locomotion Prior to Admission Walk   Assist Device Other  (BWSS)   Gait Pattern L hemiparesis; Inconsistant Linda; Slow Linda;Decreased foot clearance;L foot drag; Forward Flexion; Lateral deviation;Narrow LUCY;Shuffle;Step to;Decreased L stance; Improper weight shift   Limitations Noted In Balance; Coordination; Endurance; Heel Strike;Neglect; Midline Orientation;Posture; Safety;Speed;Strength;Swing   Provided Assistance with: Balance;Weight Shift;Limb Advancement   Wheel 50 Feet with Two Turns   Type of Assistance Needed Physical assistance;Verbal cues   Physical Assistance Level 51%-75%   Comment unable to understand and sequence with RLE for diection  Wheel 50 Feet with Two Turns CARE Score 2   Wheel 150 Feet   Physical Assistance Level Total assistance   Wheel 150 Feet CARE Score 1   Wheelchair mobility   Does the patient use a wheelchair? 1  Yes   Type of Wheelchair Used 1  Manual   Method Right lower extremity;Right upper extremity   Assistance Provided For Obstacles; Locking Brakes; Remove Leg Rest;Replace Leg Rest;Remove armrests;Replace armrests   Distance Level Surface (feet) 50 ft   Curb or Single Stair   Reason if not Attempted Safety concerns   1 Step (Curb) CARE Score 88   4 Steps   Reason if not Attempted Activity not applicable   4 Steps CARE Score 9   12 Steps   Reason if not Attempted Activity not applicable   12 Steps CARE Score 9   Picking Up Object   Reason if not Attempted Safety concerns   Picking Up Object CARE Score 88   Therapeutic Interventions   Flexibility gentle HS stretch to L prior to AFO placement  Neuromuscular Re-Education standing at R rail for weight shifting with mirror in front of pt for feedback  Initially wanted to trial srinivas thru's on 6" steps but unable to lift fully 2/2 weakness and overall tightness  Pt able to lift to perform step to's with MAXA to LLE  x15 reps   Other sit pivot transfers x2 A with MOD/MAXA to R  Equipment Use   Body Weight Support System [de-identified]' then 240' without seated rest break   R HHA and second person to advance LLE with MED AFO on to clear foot  Initially utilized ace wrap for DF A but was not enough to clear foot  Pt limted by rigitity and hip weakness  Assessment   Treatment Assessment Pt participated in skilled PT session with increased focus on functional transfers, increased NPP gait, improved weight shifting and ROM of LLE  Pt remains limited by poor LLE strength, increased LLE rigidity, decreased righting reactions to L, R lateral lean in stance and mild language barrier  Pt remains [address] with sit pivots to R side, is only amb with use of BWSS at this time and required MOD/MXAX for supine to sit 2/2 weakness and poor balance  Pt states general pain located in/around L shoulder mostly at night and in AM as she is "very stiff" and cannot perform self ROM  Pt will cont to benefit from cont gait in BWSS for increased strengthening andf ROM to LLE, improved sit pivot transfers, increased LLE ROM, improved righting reactions and increased weight shifting to L/R for offloading with gait  Problem List Decreased strength;Decreased range of motion;Decreased endurance; Impaired balance;Decreased mobility; Decreased coordination;Decreased cognition;Decreased safety awareness; Impaired tone;Pain   Barriers to Discharge Inaccessible home environment;Decreased caregiver support   PT Barriers   Functional Limitation Car transfers; Ramp negotiation;Stair negotiation;Standing;Transfers; Walking   Plan   Treatment/Interventions Functional transfer training;LE strengthening/ROM; Therapeutic exercise; Endurance training;Cognitive reorientation;Patient/family training;Gait training;Bed mobility   Progress Progressing toward goals   Recommendation   PT Discharge Recommendation   (TBD)   Equipment Recommended   (TBD)   PT Therapy Minutes   PT Time In 0830   PT Time Out 0930   PT Total Time (minutes) 60   PT Mode of treatment - Individual (minutes) 60   PT Mode of treatment - Concurrent (minutes) 0   PT Mode of treatment - Group (minutes) 0   PT Mode of treatment - Co-treat (minutes) 0   PT Mode of Treatment - Total time(minutes) 60 minutes   PT Cumulative Minutes 210   Therapy Time missed   Time missed?  No

## 2022-08-22 NOTE — ASSESSMENT & PLAN NOTE
Lab Results   Component Value Date    EGFR 58 09/12/2022    EGFR 58 09/07/2022    EGFR 45 09/05/2022    CREATININE 0 92 09/12/2022    CREATININE 0 92 09/07/2022    CREATININE 1 12 09/05/2022     Stable   Given IVF earlier in course   Avoid nephrotoxic meds  Monitor voiding status  IM consulted

## 2022-08-22 NOTE — PROGRESS NOTES
08/22/22 1000   Pain Assessment   Pain Assessment Tool 0-10   Pain Score No Pain   Restrictions/Precautions   Precautions Bed/chair alarms;Cognitive; Fall Risk;Pain;Supervision on toilet/commode   Braces or Orthoses   (resting soft hand splint)   Lifestyle   Autonomy "Already time for more exercise?"   Sit to Stand   Type of Assistance Needed Physical assistance   Physical Assistance Level 76% or more   Comment Varied support provided, pt w/ table top in front of her  Mod-MaxA x1 w/ cues for technique  W/ repetition pt w/ improved hand placement  Knee block LLE but more so use to assist w/ extension in prolonged stance  Sit to Stand CARE Score 2   Bed-Chair Transfer   Type of Assistance Needed Physical assistance   Physical Assistance Level 76% or more   Comment MaxA x1 sit pivot to pt's R side   Chair/Bed-to-Chair Transfer CARE Score 2   Transfer Bed/Chair/Wheelchair   Positioning Concerns Hemiplegia   Limitations Noted In Balance; Coordination;Problem Solving;UE Strength;LE Strength; Sequencing   Toileting Hygiene   Type of Assistance Needed Physical assistance   Physical Assistance Level Total assistance   Comment Seated pt attempts hygiene following voiding  In stance assist provided for thoroughness and Ax2 for CM in stance to manage brief and sweatpants  Toileting Hygiene CARE Score 1   Toilet Transfer   Type of Assistance Needed Physical assistance   Physical Assistance Level Total assistance   Comment ModA x1 to stand at bed rail w/ support on L side  Second person swaps out WC for BSC  Cues provided for technique  Pt toelrated well and able to respond to follow commands  Toilet Transfer CARE Score 1   ROM - Left Upper Extremities    LUE ROM Comment Increased tone in elbow upon arrival w/ flexed position while seated in chair  PROM in all planes w/ focus on tone reduction and prolonged stretch in prep for further NMR  Increased flexor tone initially during PROM    W/ continued reps pt able to tolerate elbow extension and partial shoulder flexion/exten  No complaints of pain; tolerated well  Neuromuscular Education   Comments Pt in stance at table top w/ Min-maxA  LLE knee blocking provided to assist w/ L knee in extension  Use of R hand as stabilizer on tabletop  Left UE cued to  bean bags from in front of pt, lift over small bar and place on other side  Activity completed to challenge fxnl standing tolerance and balance, fxnl reach LUE  Pt requires extra time for use of LUE but able to fully extend all digits to  bean bags, at times difficulty to lift bag up and over obstacle d/t dec coordination and weakness  W/ activity progression intermittent increased anterior and R lean but pt able to assist w/ correcting w/ cues  In stance for 5min 30s  Cognition   Overall Cognitive Status Impaired   Arousal/Participation Alert; Cooperative   Attention Attends with cues to redirect   Orientation Level Oriented X4   Memory Decreased short term memory   Following Commands Follows one step commands with increased time or repetition   Activity Tolerance   Activity Tolerance Patient tolerated treatment well   Assessment   Treatment Assessment OT session focusing on toileting tasks, fxnl transfers, standing tolerance, dynamic balance, NMR of LUE  Pt tolerated well and is progressing towards goals  Despite recent progress pt continues to require skilled hands on assist in order to maintain her safety of up to 2 people  OT to continue to treat and follow established POC  Problem List Decreased strength;Decreased range of motion;Decreased endurance; Impaired balance;Decreased mobility; Decreased coordination;Decreased cognition; Impaired judgement;Decreased safety awareness; Impaired sensation; Impaired tone;Pain   Plan   Treatment/Interventions ADL retraining;Functional transfer training; Therapeutic exercise; Endurance training;Cognitive reorientation;Equipment eval/education;Patient/family training; Compensatory technique education;Continued evaluation   Progress Progressing toward goals   OT Therapy Minutes   OT Time In 1000   OT Time Out 1100   OT Total Time (minutes) 60   OT Mode of treatment - Individual (minutes) 60   OT Mode of treatment - Concurrent (minutes) 0   OT Mode of treatment - Group (minutes) 0   OT Mode of treatment - Co-treat (minutes) 0   OT Mode of Treatment - Total time(minutes) 60 minutes   OT Cumulative Minutes 120   Therapy Time missed   Time missed?  No

## 2022-08-22 NOTE — CASE MANAGEMENT
Met w/pt and reviewed rehab routine and cm role  [facility]  Pt states she normally uses the elevator but in emergencies/fire alarms she is able to complete the stairs  Pt states that she feels she needs a first flr apmt but she has asked housing multiple times and she is not granted one  Pt states she gets a hha thru [employer] 4 days a week, mon-thur for 7 5 hrs a day  They clean, cook, do laundry and help drive her to dr foster  Pt states her son and dtr in law live about 10 min away  Pt states she has never had hhc, dme or outpt therapy services and doesn't drive  Pt uses [facility] and states her meds are delivered to her home  Pt is concerned about returning home and feels she is going to need more help  Cm reviewed potentially how long she would be in this setting and she hopes to do well so she can return home  Cm reviewed team mtg process and insurance update due 8/23

## 2022-08-22 NOTE — PCC PHYSICAL THERAPY
Patient making progress thusfar with skilled PT intervention  She has progressed this week from walking on the BWSS to overground walking with moderate/max Ax1 and a chair follow  During walking, she requires physical assist for weight shifting to clear ground, verbal cues at times to increase step length, and presents with high tone/spasticity which prevents her LE from buckling but does pull her into external rotation/toe out  Additional barriers to safe walking include retropulsion, poor dynamic balance, dec sensation/proprioception and poor righting reactions  She remains a high caregiver burden which is also a barrier to discharge as she lives home alone with aides helping M-Thursday for 7-7 5 hour a day  She is unsafe to return home and requires continued skilled PT intervention to maximize her function and safety  9/5/22  Pt cont to make slow progress towards LTGs however due to cognitive deficits, ongoing L sided deficits including hypertonia, balance deficits, delayed righting reactions, coordination deficits, and decreased family support, pt unable to return home alone at this time as she cont to require external assist for all transfers and ambulation  Pt is currently functioning at modAx1 for transfers and ambulation with RW, modAx1 for w/c propulsion  Anticipating d/c to SNF for ongoing skilled therapy services to maximize pt's independence with mobilities  9/12  Increased focus on use of RW as pt is progressing and able to advance LLE without the use of AFO  Pt remains MIN/MODA for sit to stand transfers, CGA/GERTRUDIS stand to sit, SPT with RW MIN/CGA, gait with RW MIN/CGA x150', MODA with WC mobility and cont MOD VC's for hand placement during tranfers  Pt cont to remain limited by decreased caregiver support, decreased I with functional transfers, poor righting reactions, decreased ROM to LLE and cont need for VC's with all transfers   Pt anticipates discharge to SNF for cont rehab services as pt will need to be I with all gait and functional transfers to return to apartment  Cont POC as tolerated

## 2022-08-22 NOTE — UTILIZATION REVIEW
Notification of Discharge   This is a Notification of Discharge from our facility [address]  Please be advised that this patient has been discharge from our facility  Below you will find the admission and discharge date and time including the patients disposition  UTILIZATION REVIEW CONTACT:  Sandrita Sotomayor  Utilization   Network Utilization Review Department  Phone: [phone] x carefully listen to the prompts  All voicemails are confidential   Email: [email or web address]     PHYSICIAN ADVISORY SERVICES:  FOR [id number] REVIEW - MEDICAL NECESSITY DENIAL  Phone: [phone]  Fax: [phone]  Email: [email or web address]     PRESENTATION DATE: 8/12/2022  9:35 AM  OBERVATION ADMISSION DATE:   INPATIENT ADMISSION DATE: 8/12/22 11:36 AM   DISCHARGE DATE: 8/19/2022  3:05 PM  [facility]      IMPORTANT INFORMATION:  Send all requests for admission clinical reviews, approved or denied determinations and any other requests to dedicated fax number below belonging to the campus where the patient is receiving treatment   List of dedicated fax numbers:  [address] [place] [address] DENIALS (Administrative/Medical Necessity) [phone]   [address] [place] [address] (Maternity/NICU/Pediatrics) [phone]   [facility] [phone]   [address] [facility] [phone]   [address] [place] [address] [phone]   [address] [phone]   [facility]  [phone]   [address] [place] [address], [facility]  2401 [facility] [address] [facility] [phone]

## 2022-08-22 NOTE — PROGRESS NOTES
Physical Medicine and Rehabilitation Progress Note  Raymond Gibson 80 y o  female MRN: [id number]  Unit/Bed#: -17 Encounter: [id number]    HPI: 71-year-old female with a past medical history of AFib recently on Xarelto, sick sinus syndrome status status post pacemaker placement that is not MRI compatible, hypertension, hyperlipidemia, valvular disease, coronary artery disease developed dense left-sided weakness, dysarthria and presented to the hospital on 08/12/2022  She had systolic blood pressure of 200 on arrival   CT head showed focal area of gliosis within the high right post central gyrus and scattered chronic microvascular ischemic changes with more focal lucency along the anterior limb of the right internal capsule  CTA of head and neck showed near occlusive thrombus at the right MCA bifurcation, mild beating of the mid to distal ICA suspicious mild fibromuscular dysplasia and mild atherosclerotic disease at the bilateral distal carotid arteries  She was not a candidate for MRI due to pacemaker  CT cerebral perfusion study showed 8 mL of mismatch  She was not a candidate for tPA due to bleeding risk from being on Xarelto  She was not a thrombectomy candidate for neurointerventional   Patient was recommended to transition to Pradaxa  Patient was evaluated by skilled therapies and was found to have significant decline in ADLs and ambulation and appears appropriate for admission to [place] [address]  Chief Complaint: Unable to move her L shoulder and feeling tightness/pain along her L upper trapezius  Interval History/Subjective:  Tight in elbow and in L shoulder  States she was previously able to move that shoulder, but struggles to here  She has pain along her cervical paraspinals on the L and tightness radiating down to that L shoulder  She denies any new CP, SOB, fevers, chills, N/V, abdominal pain  She is weak in her LLE  Last BM 8/21       ROS:  A 10 point review of systems was negative except for what is noted in the HPI  Today's Changes:  1  GFR 49  Can increase Baclofen to TID tomorrow and monitor for changes in mentation  2  Discussed with team, Dynasplint for L elbow? Range of motion to L shoulder  3  Has trouble tolerating resting hand splint per team, but tone in her wrist, finger flexors is actually somewhat minimal  It seems mostly in shoulder and elbow  4  Discussed with team, transition Purewick to commode at night once mobility improves  Total time spent:  35 minutes, with more than 50% spent counseling/coordinating care  Counseling includes discussion with patient re: progress in therapies, functional issues observed by therapy staff, and discussion with patient his/her current medical state/wellbeing  Coordination of patient's care was performed in conjunction with Internal Medicine service to monitor patient's labs, vitals, and management of their comorbidities  Assessment/Plan:    * Arterial ischemic stroke, MCA (middle cerebral artery), right, acute (HCC)  Assessment & Plan  - R MCA CVA   - CTA head/neck - near occlusive thrombus at the R MCA bifurcation, mild beading of the mid to distal ICAs suspicious for mild fibromuscular dysplasia, and mild atherosclerotic disease at bilateral distal carotid arteries     - no MRI because pacemaker not compatible    - Residual impairments on admission to [facility]: L spastic hemiparesis, bowel/bladder dysfunction, possible cog impairments (assess for other impairments during [facility] course)     Secondary stroke prevention  - repeat CT angio head and neck in 3 months to re-evaluate thrombus is a outpatient  - Antithrombotic: Pradaxa (switched from Xarelto for possible failure)   - Statin  - Optimal management of blood pressure and diabetes  - Patient at increased risk for stroke particularly early in post-stroke period - monitor neuro exam closely with low threshold to repeat imaging  -  patient and if applicable caregiver on optimal stroke management  - Follow-up with neurology and PCP after d/c   - Recommend acute comprehensive interdisciplinary inpatient rehabilitation to include intensive skilled therapies (PT, OT, ST) as outlined with oversight and management by rehabilitation physician as well as inpatient rehab level nursing, case management and weekly interdisciplinary team meetings  - patient does have fair left hand and wrist strength; can use resting hand splint for a few hours during the day and overnight but to not overuse and encourage use hand and wrist  - multi Podus boot on left foot patient can breaks to potentially decrease risk of contracture/skin breakdown   - patient has spastic left upper extremity making subluxation less likely although continue subluxation precautions      At risk for coping difficulty  Assessment & Plan  Supportive counseling  Psychology consult while in [facility]  Discharged on magnesium daily   Recommend magnesium level tomorrow adjust as indicated    Neck pain  Assessment & Plan  With taut muscle bands in left cervical paraspinal and trapezius areas  Gentle stretching  Baclofen 5 mg b i d  > titrating up as appropriate    PRN APAP  Consider K-pad or other topicals    At risk for altered urinary elimination  Assessment & Plan  Patient has been using ex-cath suction device - Pure-wick PTA to [facility]   - She would like to continue at least at night until mobility improves given risk of accidents and impaired hygiene  - She feels like she does have some control  - Continue just at night for now until mobility improves  - monitor for retention, incontinence, signs/symptoms of UTI  - ensure optimal bowel management   - recommend toileting program Q2-4 H during day and Q4-6H overnight   - bladder scan Q4H, monitor output and PVRs, straight cath >450 or if uncomfortable 250-449      CAD (coronary artery disease)  Assessment & Plan  IM consulted and with overall management at their discretion during [facility] course  Antithrombotic: Pradaxa  Statin  Optimal blood pressure   Outpatient Cardiology follow-up    Per IM  · "Nonobstructive  · Card cath 8/2021 done for borderline abn stress test and found 50% mid RCA/40% mid LAD = no intervention done  · Continue statin  · Was not on ASA as an OP"       Valvular heart disease  Assessment & Plan  Per IM  · "Current ECHO:  LVEF 65%, mod AR/mild AS, mod TR with severely elevated RV systolic pressure, mild-mod MR, mod LAE/mild NEO  · Euvolemic currently but will watch since CTA on admission showed pulm edema and b/l pleural effusions"  -diuretics at their discretion   -Outpatient cardiology follow-up    Muscle spasticity  Assessment & Plan  Left upper extremity significantly (also some L neck tightness/spasms)  Modified Sada scale 3 in shoulder, and 2 in her elbow  Baclofen 5mg BID trial at low dose  She is a bit tired, may need to do TID    Gentle range of motion with therapy  Patient does have movement of the hand and wrist and do want to encourage that - can still consider resting hand splint intermittently  Consider  Botox  Skilled PT OT  Optimal skin hygiene    DARRIUS (acute kidney injury) [facility])  Assessment & Plan  8/22 Crea back to baseline 1 06  Medicine consulted  Hold Lasix and Aldactone and repeat BMP in the morning  Resume diuretics at discretion of medicine  Monitor labs and output    At risk for venous thromboembolism (VTE)  Assessment & Plan  SCDs, ambulation, and Pradaxa       Pacemaker  Assessment & Plan  Not MRI compatible    Chronic atrial fibrillation ([address] )  Assessment & Plan  IM consulted and with overall management at their discretion during ARC course  Rate control: Verapamil, Nebivolol   Antithrombotic: Pradaxa       Stage 3 chronic kidney disease [facility])  Assessment & Plan  Lab Results   Component Value Date    EGFR 49 08/22/2022    EGFR 47 08/21/2022    EGFR 35 08/20/2022    CREATININE 1 06 08/22/2022    CREATININE 1 09 08/21/2022    CREATININE 1 39 (H) 08/20/2022     Given IVF  Crea now back down to ~1 06 near baseline  Avoid nephrotoxic meds  Monitor voiding status  IM consulted  Hyperlipidemia LDL goal <100  Assessment & Plan  Statin    Essential hypertension  Assessment & Plan  Internal medicine consulted and co-management with their service  Monitor vitals with and without activity; monitor for orthostasis  Monitor hemoglobin, electrolytes, kidney function, hydration status   Current meds:  Losartan, nebivolol, Verapamil 240mg daily  - Lasix and Aldactone on hold        Health Maintenance  #Delirium/Sleep: At risk  Environmental interventions  Optimize pain, bowel, bladder, sleep-wake cycle management  #Pain: Tylenol PRN  #Bowel: Last BM 8/21 and continent  Not on regimen - only PRNs  #Bladder: Voiding and mostly continent  Purewick at night to transition once more mobile  #Skin/Pressure Injury Prevention: Turn Q2hr in bed, with weight shifts [id number] in wheelchair  #DVT Prophylaxis: Fully anticoagulated on pradaxa  #GI Prophylaxis: PO diet   #Code Status: Full Code  #FEN: Reg/Thins  #Dispo: ELOS maybe 2-3 weeks  To be determined dispo pending availability of family assistance  Objective:    Functional Update:  PT: modA transfers, modA wheelchair mobility 50'   OT: Set-up oral hygiene, maxA shower/bathe self, maxA UB, total A LB dressing, total A toileting hygiene  SLP: Tan comprehension, expression, problem solving/memory, Sup social interaction  Impaired STM recall, working memory, and problem solving  Allergies per EMR    Physical Exam:  Temp:  [97 9 °F (36 6 °C)-98 2 °F (36 8 °C)] 97 9 °F (36 6 °C)  HR:  [60-78] 78  Resp:  [16-20] 20  BP: (131-144)/(56-64) 144/64  Oxygen Therapy  SpO2: 95 %    Gen: No acute distress, Well-nourished, well-appearing  HEENT: Moist mucus membranes  Cardiovascular: Regular rate, rhythm, S1/S2   Distal pulses palpable  Heme/Extr: No edema  Pulmonary: Non-labored breathing, lungs CTAB  : No contreras  GI: Soft, non-tender, non-distended  MSK: Decreased PROm in shoulder abduction and elbow extension  Able to get full elbow flexion  Able to get some ROM in shoulder flexion but not abduction  Tightness along upper trapezius on the L  Full ROM in fingers and wrists on the L and in bilateral LE  Integumentary: Skin is warm, dry  Neuro: AAOx3, Appropriate to questioning  Able to follow commands  Full strength on the RUE/RLE  Near 0-1/5 in the LLE throughout, and Shoulder/elbow testing limited by tone  Strength is 3-4/5 in her wrist and finger flexors  Hand intrinsics somewhat weak  Psych: Normal mood and affect  Diagnostic Studies: reviewed, no new imaging  No results found      Laboratory:  Reviewed   Results from last 7 days   Lab Units 08/22/22  0458 08/17/22  0515 08/16/22  0457   HEMOGLOBIN g/dL 12 0 11 8 12 0   HEMATOCRIT % 38 6 36 8 37 8   WBC Thousand/uL 7 33 9 80 6 97     Results from last 7 days   Lab Units 08/22/22  0458 08/21/22  0434 08/20/22  0522   BUN mg/dL 31* 33* 42*   POTASSIUM mmol/L 4 7 4 8 4 7   CHLORIDE mmol/L 108 108 104   CREATININE mg/dL 1 06 1 09 1 39*            Patient Active Problem List   Diagnosis    Essential hypertension    Hyperlipidemia LDL goal <100    Stage 3 chronic kidney disease (HCC)    Osteoarthritis of both wrists    Chronic atrial fibrillation (HCC)    Tubulovillous adenoma    Gastroesophageal reflux disease without esophagitis    Pacemaker    B12 deficiency    Allergic rhinitis due to pollen    Cavus deformity of foot    Midline cystocele    Hallux abducto valgus, bilateral    Left renal artery stenosis (HCC)    Osteoarthritis of hip    Osteopenia    Pain due to onychomycosis of toenails of both feet    Left atrial enlargement    Lipoma of right upper extremity    Diverticulosis of colon    Abnormal nuclear stress test    Renal insufficiency    Arterial ischemic stroke, MCA (middle cerebral artery), right, acute ([facility] )    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    Anemia    At risk for venous thromboembolism (VTE)    DARRIUS (acute kidney injury) [facility]    Muscle spasticity    Valvular heart disease    CAD (coronary artery disease)    At risk for altered urinary elimination    Neck pain    Healthcare maintenance    At risk for coping difficulty         Medications  Current Facility-Administered Medications   Medication Dose Route Frequency Provider Last Rate    acetaminophen  650 mg Oral Q6H PRN Pranav Rice MD      baclofen  5 mg Oral BID Pranav Rice MD      bisacodyl  10 mg Rectal Daily PRN Pranav Rice MD      dabigatran etexilate  150 mg Oral Q12H [address] Pranav Rice MD      losartan  100 mg Oral Daily Pranav Rice MD      magnesium oxide  400 mg Oral Daily Pranav Rice MD      nebivolol  5 mg Oral Daily Pranav Rice MD      polyethylene glycol  17 g Oral Daily PRN Pranav Rice MD      pravastatin  40 mg Oral Daily With Willi Vann MD      verapamil  240 mg Oral HS Pranav Rice MD            ** Please Note: Fluency Direct voice to text software may have been used in the creation of this document   **

## 2022-08-22 NOTE — PROGRESS NOTES
Internal Medicine Progress Note  Patient: Sunil Nelson  Age/sex: 80 y o  female  Medical Record #: [id number]      ASSESSMENT/PLAN: (Interval History)  Sunil Nelson is seen and examined and management for following issues:    Acute embolic right MCA CVA  · Had near occlusion of right MCA  · Felt to be a Xarelto failure and was switched to Pradaxa  · ECHO w/o thrombus  · Continue statin  · On Baclofen for tone left arm     Chronic atrial fibrillation  · Sees Dr Gianna Horvath as OP  · Rates controlled  · On Pradaxa now, Xarelto stopped     PPM  · VVI  · Is not MRI conditional     Valvular disease  · Current ECHO:  LVEF 65%, mod AR/mild AS, mod TR with severely elevated RV systolic pressure, mild-mod MR, mod LAE/mild NEO  · Euvolemic currently     HTN  · Home:  Verapamil 240mg qhs/Cozaar 842 mg qd/Bystolic 5mg qd/lasix 43GA qd/Aldactome 25mg qd  · Here:  Verapamil 240mg qhs/Cozaar 270CL qd/Bystolic 5mg qd  · OP note states has left RA stenosis but no testing in OP records or Care Everywhere to verify     HLD  · Home:  Crestor 10mg qd = Neuro rec when discharged to reduce to 5mg qd  · Here:  Pravachol 40mg qd     DARRIUS  · S/p NS 1 liter   · Back to baseline  · Continue to hold Lasix and Aldactone     CAD  · Nonobstructive  · Card cath 8/2021 done for borderline abn stress test and found 50% mid RCA/40% mid LAD = no intervention done  · Continue statin  · Was not on ASA as an OP     Hypomagnesemia  · Takes here and at home 400 mg MagOx  · Mg level 8/20 was 2 4       [facility] planning: Team    The above assessment and plan was reviewed and updated as determined by my evaluation of the patient on 8/22/2022      Labs:   Results from last 7 days   Lab Units 08/22/22  0458 08/17/22  0515   WBC Thousand/uL 7 33 9 80   HEMOGLOBIN g/dL 12 0 11 8   HEMATOCRIT % 38 6 36 8   PLATELETS Thousands/uL 360 323     Results from last 7 days   Lab Units 08/22/22  0458 08/21/22  0434   SODIUM mmol/L 134* 134*   POTASSIUM mmol/L 4 7 4 8 CHLORIDE mmol/L 108 108   CO2 mmol/L 22 21   BUN mg/dL 31* 33*   CREATININE mg/dL 1 06 1 09   CALCIUM mg/dL 9 3 9 1                   Review of Scheduled Meds:  Current Facility-Administered Medications   Medication Dose Route Frequency Provider Last Rate    acetaminophen  650 mg Oral Q6H PRN Diana Amezcua MD      baclofen  5 mg Oral BID Diana Amezcua MD      bisacodyl  10 mg Rectal Daily PRN Diana Amezcua MD      dabigatran etexilate  150 mg Oral Q12H [facility] Diana Amezcua MD      losartan  100 mg Oral Daily Diana Amezcua MD      magnesium oxide  400 mg Oral Daily Diana Amezcua MD      nebivolol  5 mg Oral Daily Diana Amezcua MD      polyethylene glycol  17 g Oral Daily PRN Diana Amezcua MD      pravastatin  40 mg Oral Daily With Abdiaziz Goodwin MD      verapamil  240 mg Oral HS Diana Amezcua MD         Subjective/ HPI: Patient seen and examined  Patients overnight issues or events were reviewed with nursing or staff during rounds or morning huddle session  New or overnight issues include the following:     No new or overnight issues  Offers no complaints    ROS:   A 10 point ROS was performed; negative except as noted above         Imaging:     No orders to display       *Labs /Radiology studies reviewed  *Medications reviewed and reconciled as needed  *Please refer to order section for additional ordered labs studies  *Case discussed with primary attending during morning huddle case rounds    Physical Examination:  Vitals:   Vitals:    08/21/22 0440 08/21/22 1335 08/21/22 2106 08/22/22 0459   BP: 141/71 130/57 131/60 131/56   BP Location: Right arm Right arm Right arm Right arm   Pulse: 68 66 64 60   Resp: 20 18 16 16   Temp: 97 7 °F (36 5 °C) 98 °F (36 7 °C) 98 1 °F (36 7 °C) 98 2 °F (36 8 °C)   TempSrc: Oral Oral Oral Oral   SpO2: 97% 98% 97% 96%   Weight:       Height:           General Appearance: no distress, conversive  HEENT: PERRLA, conjuctiva normal; oropharynx clear; mucous membranes moist   Neck:  Supple, normal ROM  Lungs: CTA, normal respiratory effort, no retractions, expiratory effort normal  CV: regular rate and rhythm; no rubs/murmurs/gallops, PMI normal   ABD: soft; ND/NT; +BS  EXT: no edema  Skin: normal turgor, normal texture, no rashes  Psych: affect normal, mood normal  Neuro: AAO      The above physical exam was reviewed and updated as determined by my evaluation of the patient on 8/22/2022  Invasive Devices  Report    Drain  Duration           External Urinary Catheter 8 days                   VTE Pharmacologic Prophylaxis: Pradaxa  Code Status: Level 1 - Full Code  Current Length of Stay: 3 day(s)      Total time spent:  30 minutes with more than 50% spent counseling/coordinating care  Counseling includes discussion with patient re: progress  and discussion with patient of his/her current medical state/information  Coordination of patient's care was performed in conjunction with primary service  Time invested included review of patient's labs, vitals, and management of their comorbidities with continued monitoring  In addition, this patient was discussed with medical team including physician and advanced extenders  The care of the patient was extensively discussed and appropriate treatment plan was formulated unique for this patient  ** Please Note:  voice to text software may have been used in the creation of this document   Although proof errors in transcription or interpretation are a potential of such software**

## 2022-08-22 NOTE — PCC SPEECH THERAPY
Cognitive linguistic evaluation was initiated this session, consisting of patient interview and portions of the Informal Cognitive Linguistic Evaluation Tool  Evaluation was completed in Frisian, as this is pt's primary language, utilizing the [employer] phone  Pt's daughter, Tierra Dangelo, was also present throughout evaluation  Pt reports that she lives alone but does have a HHA present to assist her, however, did not mention additional details related to this as she states her HHA has returned to work elsewhere  Pt and pt's daughter indicate that pt's son and her daughter in law assist pt with driving and other tasks as needed  Pt reports that she was independent for all IADLs but that her son has now been managing her finances since her admission  During interview, pt demonstrated good insight into physical deficits, also explaining the 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 as this affects her ability to complete tasks independently at this time  Overall, current assessment is limited but pt is presenting with suspected mild deficits in cognitive linguistic skills to include decreased working memory, short term memory, problem solving, attention, comprehension, and executive functions  Pt was appropriate in social interaction and demonstrated fairly functional expression and basic comprehension skills, however, did benefit from occasional repetition of information to improve understanding  Pt will benefit from further evaluation of skills in order to obtain greater insight and information into current functioning  Based on current evaluation, pt is recommended for skilled SLP services during acute rehab stay in order to further assess current skills, as well as to maximize overall cognitive linguistic skills for increased level of independence and safety and to decrease caregiver burden on discharge      Update from week 8/30/2022: Pt continues to be followed for ongoing cognitive tx sessions, in which pt is making slower and steady progress at this time  It is noted that while pt is able to understand basic questions/information in [place], pt does benefit from more complex questions to be presented in [place]  Additionally, when engaging in structured tasks, presentation of information is provided in [place] at this time  Ongoing barriers which present include decreased ST/working memory, decreased executive function skills (problem solving, reasoning, sequencing, organization of thoughts), but when pt is provided increased time and rephrasing given information, improvement is noted in comprehension skills  Currently pt is functioning at supervision level given comprehension, expression, executive function and memory skills and mod I for social interaction  Of note, in more recent sessions, pt had increased cough earlier in the day where pt was bringing up mucous  When engaging in d/w pt in regard to this, SLP did use Tappr ashlyn for improved comprehension given questions  Pt does deny any h/o allergies, post nasal drip prior to admission  In pt's description of symptoms to SLP, sounds likely due to post nasal drip type symptoms  When asking if pt has been exhibiting any increased cough given meals, pt did deny at this time  SLP reached out to MD, Dr Benuel Gosselin to discuss decongestant due to suspected post nasal drip, to where it was noted that swallow evaluation orders have been placed  Dysphagia bedside assessment was completed in which based on oral mechanism exam and observation of meal, pt demonstrates oral motor skills and swallowing function that are considered WFL  Adequate oral motor coordination/control and strong cough are positive factors in reducing her risk for aspiration  Pt denies chronic coughing and/or overt s/sx of aspiration during mealtime, but notes coughing up mucous between meals; therefore, suspect coughing may be secondary to sinuses/post nasal drip   SLP reviewed safe swallowing strategies (slow rate, small bites, alternating liquids and solids, remain upright for 30-45 mins after eating) and pt verbalized understanding  Pt independently alternated liquids and solids consistently throughout meal  Although she was noted to occasionally take larger bites and use a faster rate, she safely managed mixed consistencies  Skilled SLP services targeting dysphagia are not warranted at this time as , but encourage pt to alert staff if she notices any changes with swallowing function  Currently primary focus will be for cognitive tx sessions to maximize overall independence given cognitive linguistic skills to decrease overall caregiver burden at time of discharge  Update week of 9/6/2022: Pt continues to be followed for cognitive linguistic therapy, in addition to dysphagia therapy as of yesterday  Regarding cognitive linguistic skills, pt remains with decreased processing, comprehension, short term memory recall/working memory, problem solving/reasoning, and executive functions noting these areas to be barriers at this time  Pt is currently functioning at supervision for expression, min assist to supervision for comprehension and min assist for memory and problem solving  Additionally, pt recently reported new difficulty swallowing and feeling of food being "stuck" at the level of her lower neck/upper chest area  Pt continues to have increased spitting out of saliva, along with occasional coughing episodes outside of PO intake  However, yesterday, pt reported difficulty in swallowing food and demonstrated coughing with food intake  Pt was reassessed for swallow function where pt found to be presenting with overt pharyngeal s/s dysphagia vs s/s of esophageal dysphagia and was recommended for completion of a VFSS to further evaluation swallow function and to assist in guiding recommendations and treatment plan based on discussion with internal medicine and PMR physician   VFSS planned for 9/6/22 at 1430 with results to follow  Pt remains on a regular diet and thin liquids, with recommendations to choose softer items for now  No overt s/s aspiration with thin liquids  At this time, pt is recommended for further skilled SLP services in order to maximize overall cognitive linguistic skills, as well as to further evaluate swallow function and to guide in recommendations and treatment plan  Update from week of 9/12/2022: Pt completed VFSS as planned on 9/6/2022: results as follows: Pt presents with overall functional oral and pharyngeal swallow skills given pt's current age and across items assessed this study  Despite mildly reduced oral control of liquids when taken by cup sips, resulting in premature spillage to the level of the pyriforms, pt demonstrated greater oral control with use of straw and maintained airway protection when taken via cup and straw  Overall swallows remained timely with adequate airway protection with intake of all items  Occasional high penetration of thin liquids but no further deeper penetration or aspiration events  All boluses passed through pharynx and esophagus during brief esophageal screen  Pt without any coughing, throat clearing or expelling of saliva  Pt also denied globus sensation or reflux during study  Recommendations are for cont'd regular diet and thin liquids  Follow up was completed with meal at bedside, pt with no further complaints or deficits noted therefore no further dysphagia tx services warranted at this time  In regards to cognition, pt is making good progress towards goals- currently functioning at min A problem solving and memory and supervision comprehension and expression  Pt conts with barriers in ecreased processing, short term memory recall/working memory, problem solving/reasoning, and executive functions   Due to slow progress and decreased family support at home, recommendations were for subacute rehab placement which discharge is pending medical clearance  Pt will cont to benefit from skilled SLP services to maximzie cognitive linguistic communication skills at this time

## 2022-08-22 NOTE — PROGRESS NOTES
08/22/22 1230   Pain Assessment   Pain Assessment Tool 0-10   Pain Score No Pain   Restrictions/Precautions   Precautions Bed/chair alarms;Cognitive; Fall Risk;Pain   Weight Bearing Restrictions No   ROM Restrictions No   Cognition   Overall Cognitive Status Impaired   Arousal/Participation Alert; Cooperative   Attention Attends with cues to redirect   Orientation Level Oriented X4   Memory Decreased short term memory   Following Commands Follows one step commands with increased time or repetition   Roll Left and Right   Type of Assistance Needed Physical assistance;Verbal cues; Adaptive equipment   Physical Assistance Level 76% or more   Comment MODA to R, MAXA to Lwith bed rails   Roll Left and Right CARE Score 2   Sit to Lying   Type of Assistance Needed Physical assistance;Verbal cues; Adaptive equipment   Physical Assistance Level 51%-75%   Comment A with BLE   Sit to Lying CARE Score 2   Sit to Stand   Type of Assistance Needed Physical assistance;Verbal cues   Physical Assistance Level 51%-75%   Comment at rail in hallway   Sit to Stand CARE Score 2   Bed-Chair Transfer   Type of Assistance Needed Physical assistance;Verbal cues; Adaptive equipment   Physical Assistance Level 51%-75%   Comment sit pivots to R   Chair/Bed-to-Chair Transfer CARE Score 2   Car Transfer   Reason if not Attempted Environmental limitations   Car Transfer CARE Score 10   Walk 10 Feet   Reason if not Attempted Safety concerns   Walk 10 Feet CARE Score 88   Walk 50 Feet with Two Turns   Reason if not Attempted Safety concerns   Walk 50 Feet with Two Turns CARE Score 88   Walk 150 Feet   Reason if not Attempted Safety concerns   Walk 150 Feet CARE Score 88   Walking 10 Feet on Uneven Surfaces   Reason if not Attempted Safety concerns   Walking 10 Feet on Uneven Surfaces CARE Score 88   Ambulation   Does the patient walk? 2   Yes   Primary Mode of Locomotion Prior to Admission Walk   Distance Walked (feet) 30 ft  (x3)   Assist Device Other  (R rail)   Gait Pattern Inconsistant Linda; Slow Linda;Decreased foot clearance;L foot drag; Forward Flexion;L hemiparesis; Lateral deviation;Narrow LUCY;Step to; Improper weight shift;Decreased L stance   Limitations Noted In Balance; Coordination; Endurance; Heel Strike;Neglect; Midline Orientation;Posture; Safety;Speed;Swing;Strength   Provided Assistance with: Balance;Weight Shift;Limb Advancement   Walk Assist Level Maximum Assist;Chair Follow   Findings performed at [facility] with chair follow, A to clear L foot with AFO on   Wheel 50 Feet with Two Turns   Type of Assistance Needed Verbal cues; Physical assistance   Physical Assistance Level 51%-75%   Wheel 50 Feet with Two Turns CARE Score 2   Wheel 150 Feet   Type of Assistance Needed Physical assistance;Verbal cues   Physical Assistance Level 51%-75%   Wheel 150 Feet CARE Score 2   Wheelchair mobility   Does the patient use a wheelchair? 1  Yes   Type of Wheelchair Used 1  Manual   Method Right upper extremity;Right lower extremity   Assistance Provided For Obstacles;Remove Leg Rest;Replace Leg Rest;Remove armrests;Replace armrests   Distance Level Surface (feet) 150 ft  (x2)   Curb or Single Stair   Reason if not Attempted Safety concerns   1 Step (Curb) CARE Score 88   4 Steps   Reason if not Attempted Activity not applicable   4 Steps CARE Score 9   12 Steps   Reason if not Attempted Activity not applicable   12 Steps CARE Score 9   Picking Up Object   Reason if not Attempted Safety concerns   Picking Up Object CARE Score 88   Therapeutic Interventions   Flexibility HS/HC stretch to LLE in seated prior to amb   Neuromuscular Re-Education along  rail 30' x3 reps with MODA and A to LLE for advancement with WC follow     Equipment Use   NuStep x10 min BLE and RUE, manual A to increased WB to LLE   Assessment   Treatment Assessment Pt participated in skilled PT session with increased focus on NPP gait, increased act tolerance, improved step length on LLE, increased ROM to LLE and decreased tone in LLE  Pt noted improved gait at rail in PM session post use of NuStep which helped break some tone possibly  Pt would benefit from PNF movements to LE to cont to break her tone and rigidity  Pt will cont to work towards improved functional transfers, increased gait, improved step length to LLE and increased ROM  Cont POC as tolerated  Problem List Decreased strength;Decreased range of motion;Decreased endurance; Impaired balance;Decreased mobility; Decreased cognition;Decreased coordination; Impaired judgement;Decreased safety awareness; Impaired tone;Pain   Barriers to Discharge Inaccessible home environment;Decreased caregiver support   PT Barriers   Functional Limitation Car transfers; Ramp negotiation;Stair negotiation;Transfers;Standing;Walking; Wheelchair management   Plan   Treatment/Interventions Functional transfer training;LE strengthening/ROM; Therapeutic exercise; Endurance training;Cognitive reorientation;Patient/family training;Bed mobility;Gait training   Progress Progressing toward goals   Recommendation   PT Discharge Recommendation   (TBD)   Equipment Recommended   (TBD)   PT Therapy Minutes   PT Time In 1230   PT Time Out 1330   PT Total Time (minutes) 60   PT Mode of treatment - Individual (minutes) 60   PT Mode of treatment - Concurrent (minutes) 0   PT Mode of treatment - Group (minutes) 0   PT Mode of treatment - Co-treat (minutes) 0   PT Mode of Treatment - Total time(minutes) 60 minutes   PT Cumulative Minutes 270   Therapy Time missed   Time missed?  No

## 2022-08-22 NOTE — PROGRESS NOTES
08/22/22 1400   Clinical Encounter Type   Visited With Patient   Routine Visit Follow-up   Sacramental Encounters   Communion Given Indicator Yes   Sacrament Other Other (Comment)  ([place] by Fr Debbie Luque)

## 2022-08-22 NOTE — PCC OCCUPATIONAL THERAPY
Occupational Therapy Weekly Team Note    Pt continues to demonstrate good progress with occupational therapy and is progressing toward long term goals for ADL, IADL, and functional transfers/mobility  Pt has made significant gains in the last week with overall L UE function  Pt able to improve fine motor, and gross motor coordination by significant amounts  Pt is progression with functional transfers, and functional activities  Pts long term goals for ADLs are Independent with [address] and wheelchair  Pt continues to present with impairments in activity tolerance, endurance, standing balance/tolerance, sitting balance/tolerance, UE strength, UE ROM, and (L) attention   Occupational performance remains limited by abnormal tone, (L) UE dysmetria , (L) hemiparesis, decreased caregiver support, and risk for falls  Pt will continue to benefit from skilled acute rehab OT services to address above mentioned barriers and maximize functional independence in baseline areas of occupation to meet established treatment goals with overall decreased burden of care  Plan of care to continue to focus on ADL Retraining , LB Dressing, UB dressing,  LHAE education/training, Functional Cognition, Functional Attention, Standing tolerance, Standing balance , UE NMR left, midline awareness, Fine motor coordination, Gross motor coordination, R attention, DME training/education, Family training/education, Energy conservation training/education, healthy coping education, Leisure and social pursuits, and sitting balance  Plan for pt to be DC to [facility] to maximize function with ADL completion  Pt functioning at MOD A overall for ADL completion and would benefit from continued OT services with interventions focused on impairments mentioned above

## 2022-08-23 PROCEDURE — 97130 THER IVNTJ EA ADDL 15 MIN: CPT

## 2022-08-23 PROCEDURE — 97112 NEUROMUSCULAR REEDUCATION: CPT

## 2022-08-23 PROCEDURE — 97530 THERAPEUTIC ACTIVITIES: CPT

## 2022-08-23 PROCEDURE — 97129 THER IVNTJ 1ST 15 MIN: CPT

## 2022-08-23 PROCEDURE — 99233 SBSQ HOSP IP/OBS HIGH 50: CPT | Performed by: PHYSICAL MEDICINE & REHABILITATION

## 2022-08-23 PROCEDURE — 97535 SELF CARE MNGMENT TRAINING: CPT

## 2022-08-23 PROCEDURE — 99232 SBSQ HOSP IP/OBS MODERATE 35: CPT | Performed by: INTERNAL MEDICINE

## 2022-08-23 PROCEDURE — 97110 THERAPEUTIC EXERCISES: CPT

## 2022-08-23 RX ORDER — MUSCLE RUB CREAM 100; 150 MG/G; MG/G
CREAM TOPICAL 2 TIMES DAILY
Status: DISCONTINUED | OUTPATIENT
Start: 2022-08-23 | End: 2022-09-13 | Stop reason: HOSPADM

## 2022-08-23 RX ADMIN — DABIGATRAN ETEXILATE MESYLATE 150 MG: 150 CAPSULE ORAL at 08:37

## 2022-08-23 RX ADMIN — PRAVASTATIN SODIUM 40 MG: 40 TABLET ORAL at 17:21

## 2022-08-23 RX ADMIN — VERAPAMIL HYDROCHLORIDE 240 MG: 240 TABLET ORAL at 21:02

## 2022-08-23 RX ADMIN — BACLOFEN 5 MG: 10 TABLET ORAL at 17:20

## 2022-08-23 RX ADMIN — DABIGATRAN ETEXILATE MESYLATE 150 MG: 150 CAPSULE ORAL at 21:02

## 2022-08-23 RX ADMIN — ACETAMINOPHEN 650 MG: 325 TABLET ORAL at 21:04

## 2022-08-23 RX ADMIN — MAGNESIUM OXIDE TAB 400 MG (241.3 MG ELEMENTAL MG) 400 MG: 400 (241.3 MG) TAB at 08:37

## 2022-08-23 RX ADMIN — LOSARTAN POTASSIUM 100 MG: 50 TABLET, FILM COATED ORAL at 08:37

## 2022-08-23 RX ADMIN — BACLOFEN 5 MG: 10 TABLET ORAL at 06:39

## 2022-08-23 RX ADMIN — NEBIVOLOL 5 MG: 5 TABLET ORAL at 08:37

## 2022-08-23 NOTE — TEAM CONFERENCE
Acute RehabilitationTeam Conference Note  Date: 8/23/2022   Time: 11:46 AM       Patient Name:  Ck Espinoza       Medical Record Number: [id number]   YOB: 1940  Sex: Female          Room/Bed:  /-01  Payor Info:  Payor: [id number] [place] [address],[address]  REP / Plan: Mirna Du / Product Type: Medicare HMO /      Admitting Diagnosis: Stroke (cerebrum) ([facility] ) [I63 9]   Admit Date/Time:  8/19/2022  3:22 PM  Admission Comments: No comment available     Primary Diagnosis:  Arterial ischemic stroke, MCA (middle cerebral artery), right, acute ([facility] )  Principal Problem: Arterial ischemic stroke, MCA (middle cerebral artery), right, acute ([facility] )    Patient Active Problem List    Diagnosis Date Noted    Valvular heart disease 08/20/2022    CAD (coronary artery disease) 08/20/2022    At risk for altered urinary elimination 08/20/2022    Neck pain 08/20/2022   [address] maintenance 08/20/2022    At risk for coping difficulty 08/20/2022    Anemia 08/19/2022    At risk for venous thromboembolism (VTE) 08/19/2022    DARRIUS (acute kidney injury) ([facility] ) 08/19/2022    Muscle spasticity 08/19/2022    Arterial ischemic stroke, MCA (middle cerebral artery), right, acute ([facility] ) 08/12/2022    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 ([facility] ) 08/12/2022    Renal insufficiency     Abnormal nuclear stress test 08/20/2021    Diverticulosis of colon 05/20/2019    Pacemaker 02/26/2018    Tubulovillous adenoma 02/09/2018    Gastroesophageal reflux disease without esophagitis 02/09/2018    Essential hypertension 12/08/2017    Hyperlipidemia LDL goal <100 12/08/2017    Stage 3 chronic kidney disease ([address] ) 12/08/2017    Osteoarthritis of both wrists 12/08/2017    Chronic atrial fibrillation ([address] ) 12/08/2017    Cavus deformity of foot 06/27/2017    Hallux abducto valgus, bilateral 06/27/2017    Lipoma of right upper extremity 05/17/2017    Pain due to onychomycosis of toenails of both feet 01/20/2017    Allergic rhinitis due to pollen 10/12/2015    Midline cystocele 12/23/2014    Osteoarthritis of hip 07/17/2014    B12 deficiency 12/06/2013    Osteopenia 11/15/2013    Left renal artery stenosis ([facility] ) 08/27/2013    Left atrial enlargement 08/27/2013       Physical Therapy:    Weight Bearing Status: Full Weight Bearing  Transfers: Maximum Assistance  Bed Mobility: Moderate Assistance, Maximum Assistance  Amulation Distance (ft): 30 feet (along R rail and up to 260' in BWSS )  Ambulation: Total Assistance, Assist of 2  Assistive Device for Ambulation: Other (rail)  Wheelchair Mobility Distance: 150 ft  Wheelchair Mobility: Moderate Assistance  Discharge Recommendations:  (TBD)    8/22   Pt remains limited by poor LLE strength, increased LLE rigidity, decreased righting reactions to L, R lateral lean in stance and mild language barrier  Pt remains [address] with sit pivots to R side, is only amb with use of BWSS or at R rail at this time and required MOD/MXAX for supine to sit 2/2 weakness and poor balance  Pt states general pain located in/around L shoulder mostly at night and in AM as she is "very stiff" and cannot perform self ROM  Pt currently lives on 8th floor apartment with elevator and has 4 days a week 7 hour help that can do cleaning, cooking and laundry  Per pt her son lives around 10 min away and she did not drive PTA  Pt will require 24/7 A at current level of function and would be WC level making her high risk for falls  Pt will cont to benefit from cont gait in BWSS for increased strengthening andf ROM to LLE, improved sit pivot transfers, increased LLE ROM, improved righting reactions and increased weight shifting to L/R for offloading with gait        Occupational Therapy:  Eating: Supervision  Grooming: Maximum Assistance  Bathing: Assist of 2  Bathing: Assist of 2  Upper Body Dressing: Maximum Assistance  Lower Body Dressing: Assist of 2  Toileting: Assist of 2  Toilet Transfer: Assist of 2  Cognition: Exceptions to WNL  Cognition: Decreased Executive Functions, Decreased Attention, Decreased Comprehension, Decreased Safety  Orientation: Person, Place, Time, Situation  Discharge Recommendations:  (pending progress)       Pt is a 79 y/o F admitted to [facility] with R MCA CVA with L hemibody currently presenting below baseline level  PTA pt independent with ADLs, txfrs and had A with IADLs from [facility]  She is currently demonstrating increased LUE tone limiting AROM, PROM and strength  Also with increased extensor tone in LLE and LUE    Fair sitting bal EOB, but good overall tolerance  Above deficits limiting participation in A with ADLs and txfrs as compared to baseline level  Pt currently requires Ax2 for swap out toileting method and up to Ax2 for basic ADLs  Pt motivated and willing to participate therefore recommended mod I level for ADLs and txfrs  Speech Therapy:  Mode of Communication: Verbal  Cognition: Exceptions to WNL  Cognition: Decreased Memory, Decreased Executive Functions, Decreased Attention, Decreased Comprehension, Decreased Safety  Orientation: Person, Place, Time, Situation  Discharge Recommendations: Home with:  [address] with[de-identified] 24 Hour Supervision, 24 Hour Assisteance, Family Support  Cognitive linguistic evaluation was initiated this session, consisting of patient interview and portions of the Informal Cognitive Linguistic Evaluation Tool  Evaluation was completed in Sami, as this is pt's primary language, utilizing the [employer] phone  Pt's daughter, Jaxon Stone, was also present throughout evaluation  Pt reports that she lives alone but does have a HHA present to assist her, however, did not mention additional details related to this as she states her HHA has returned to work elsewhere  Pt and pt's daughter indicate that pt's son and her daughter in law assist pt with driving and other tasks as needed   Pt reports that she was independent for all IADLs but that her son has now been managing her finances since her admission  During interview, pt demonstrated good insight into physical deficits, also explaining the 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 as this affects her ability to complete tasks independently at this time  Overall, current assessment is limited but pt is presenting with suspected mild deficits in cognitive linguistic skills to include decreased working memory, short term memory, problem solving, attention, comprehension, and executive functions  Pt was appropriate in social interaction and demonstrated fairly functional expression and basic comprehension skills, however, did benefit from occasional repetition of information to improve understanding  Pt will benefit from further evaluation of skills in order to obtain greater insight and information into current functioning  Based on current evaluation, pt is recommended for skilled SLP services during acute rehab stay in order to further assess current skills, as well as to maximize overall cognitive linguistic skills for increased level of independence and safety and to decrease caregiver burden on discharge  Nursing Notes:                                                                           Pain Location/Orientation: Orientation: Left, Location: Shoulder  Pain Score: 0                       Hospital Pain Intervention(s): Rest, Repositioned          51-year-old female with a past medical history of AFib recently on Xarelto, sick sinus syndrome status status post pacemaker placement that is not MRI compatible, hypertension, hyperlipidemia, valvular disease, coronary artery disease developed dense left-sided weakness, dysarthria and presented to the hospital on 08/12/2022    She had systolic blood pressure of 200 on arrival   CT head showed focal area of gliosis within the high right post central gyrus and scattered chronic microvascular ischemic changes with more focal lucency along the anterior limb of the right internal capsule  CTA of head and neck showed near occlusive thrombus at the right MCA bifurcation, mild beating of the mid to distal ICA suspicious mild fibromuscular dysplasia and mild atherosclerotic disease at the bilateral distal carotid arteries  She was not a candidate for MRI due to pacemaker  CT cerebral perfusion study showed 8 mL of mismatch  She was not a candidate for tPA due to bleeding risk from being on Xarelto  She was not a thrombectomy candidate for neurointerventional     This week we will encourage independence with ADLs while monitoring labs and vitals and maintaining skin integrity  We will keep the patent free from falls by maintaining safety precautions with patient education and safe transfers  We will assess the patient's pain and medicate accordingly  Case Management:     Discharge Planning  Living Arrangements: Lives Alone  Support Systems: Son  Assistance Needed: TBD  Type of Current Residence: Other (Comment)  Current Home Care Services: Yes  Type of Current Home Care Services: Home health aide  Pt admitted s/pt stroke and resides alone  Pt has  caregiver 7 5 hrs/day 4 days a week  At this time pt ackowledges she needs additional support if she is to return home  Dc plan uncertain at this time, but pt may require skilled facility setting on dc pending progress and level of support  Contd stay review due today  Is the patient actively participating in therapies?  yes  List any modifications to the treatment plan:     Barriers Interventions   Lives alone Progress to safest goal for dc vs subacute   Decreased rom , lue and lle weakness Therapy exercises, rom exercises   Midline control Centering exercises   Tone during transfers Therapy exercises         Is the patient making expected progress toward goals? no  List any update or changes to goals:     Medical Goals: Patient will be medically stable for discharge to [facility] restrictive envrionment upon completion of rehab program and Patient will be able to manage medical conditions and comorbid conditions with medications and follow up upon completion of rehab program    Weekly Team Goals:   Rehab Team Goals  ADL Team Goal: Patient will be independent with ADLs with least restrictive device upon completion of rehab program  Bowel/Bladder Team Goal: Patient will return to premorbid level for bladder/bowel management upon completion of rehab program  Transfer Team Goal: Patient will be independent with transfers with least restrictive device upon completion of rehab program  Locomotion Team Goal: Patient will be independent with locomotion with least restrictive device upon completion of rehab program  Cognitive Team Goal: Patient will be independent for basic  tasks and require supervision for complex tasks upon completion of rehab program    Discussion: pt presents with the above barriers and team is using an afo on the lle for ambulation  Functionally pt is mod to max with sit pivot transfers and an assist of two for ambulation in the body weight support system  Cog assessment in progress in Greenlandic  Mild cog deficits currently  Overall adls are mod to max a  Clinical update to be done today  Estimated los 3-4 weeks  Anticipated Discharge Date:  reteam [facility] Team Members Present: The following team members are supervising care for this patient and were present during this Weekly Team Conference      Physician: Dr Ron Kuo MD  : AYAKA Milner  Registered Nurse: Leo Giles RN, BSN, CRRN  Physical Therapist: Ian Berkowitz DPT   Occupational Therapist: ALEX Cramer  Speech Therapist: Comfort [address], [address] [facility]

## 2022-08-23 NOTE — CASE MANAGEMENT
Clinical update faxed to Jose Bird at [facility] via [facility], [phone]  Awaiting determination

## 2022-08-23 NOTE — PROGRESS NOTES
08/23/22 1500   Pain Assessment   Pain Assessment Tool 0-10   Pain Score No Pain   Restrictions/Precautions   Precautions Bed/chair alarms;Cognitive; Fall Risk;Pain;Supervision on toilet/commode   Weight Bearing Restrictions No   ROM Restrictions No   Braces or Orthoses   (L AFO and silver TB wrap to assist with DF,Knee extension, hip flexion)   Subjective   Subjective Pt reporting fatigue but willing to participate in tx  Sit to Lying   Type of Assistance Needed Physical assistance   Physical Assistance Level 76% or more   Comment for trunk and LE assistance   Sit to Lying CARE Score 2   Sit to Stand   Type of Assistance Needed Physical assistance   Physical Assistance Level 76% or more   Comment maxAx1 with therapist standing in front of pt to scooby BWSS harness with "bear hug" technique   Sit to Stand CARE Score 2   Bed-Chair Transfer   Type of Assistance Needed Physical assistance   Physical Assistance Level 76% or more   Comment maxAx1 sit pivot transfer to L and R side   Chair/Bed-to-Chair Transfer CARE Score 2   Walk 10 Feet   Reason if not Attempted Safety concerns   Walk 10 Feet CARE Score 88   Walk 50 Feet with Two Turns   Reason if not Attempted Safety concerns   Walk 50 Feet with Two Turns CARE Score 88   Walk 150 Feet   Reason if not Attempted Safety concerns   Walk 150 Feet CARE Score 88   Walking 10 Feet on Uneven Surfaces   Reason if not Attempted Safety concerns   Walking 10 Feet on Uneven Surfaces CARE Score 88   Ambulation   Does the patient walk? 2  Yes   Primary Mode of Locomotion Prior to Admission Walk   Distance Walked (feet) 160 ft  (x1, 80'x1, 40'x1)   Assist Device   (BWSS)   Gait Pattern Inconsistant Linda; Slow Linda;Decreased foot clearance;L foot drag;L hemiparesis;L knee charles;Narrow LUCY;Shuffle;Decreased L stance; Improper weight shift   Limitations Noted In Balance; Coordination; Endurance; Heel Strike;Posture; Safety; Sequencing;Speed;Strength;Swing   Provided Assistance with: Balance;Direction;Limb Advancement;Limb Stabilization;Trunk Support;Weight Shift   Walk Assist Level Maximum Assist  (AX2 with 3rd person holding mirror for therapist)   Findings with one person providing HHA on R side, 2nd person helping to advance LLE with use of silver TB wrap and L AFO, 3rd person holding mirror in front of pt for visual feedback to try and correct R lateral lean which pt displays when ambulating  With maxAx1 for weight shift pt able to advance LLE approx 10% of time without physical A for LLE but when fatigued requires total A to advance  Pt with decreased L stance and poor ability to weight shift requiring total A  Requires vc's to look in mirror and correct R lateral lean 100% of time  Would cont to benefit from use of BWSS for ambulation trials to improve pt's gait  Of note pt with poor UE weight bearing during HHA   Wheel 50 Feet with Two Turns   Type of Assistance Needed Physical assistance   Physical Assistance Level 51%-75%   Wheel 50 Feet with Two Turns CARE Score 2   Wheelchair mobility   Does the patient use a wheelchair? 1  Yes   Type of Wheelchair Used 1  Manual   Method Right upper extremity;Right lower extremity   Distance Level Surface (feet) 50 ft   Curb or Single Stair   Reason if not Attempted Safety concerns   1 Step (Curb) CARE Score 88   4 Steps   Reason if not Attempted Activity not applicable   4 Steps CARE Score 9   12 Steps   Reason if not Attempted Activity not applicable   12 Steps CARE Score 9   Picking Up Object   Reason if not Attempted Safety concerns   Picking Up Object CARE Score 88   Assessment   Treatment Assessment 60 min skilled PT session focusing on use of BWSS for gait trials as part of NPP with use of L AFO, silver LE TB wrap to assist with DF, knee extension and hip flexion, improving pt's endurance and transfer trng   In BWSS pt demo'ing R lateral lean again this session, did use mirror for visual feedback however pt needs constant cueing to correct R lateral lean  Able ot advance LLE with maxAx1 for weight shifting approx 10% of time but pt initiating movement this session which is improvement from previous session as pt unable to advance or move LLE during step taps  Pt does faitgue requiring therapeutic rest breaks between trials but with fatigue pt able to continue  End of session pt returned to bed per pt request, AFO doffed and skin checked, no redness or skin breakdown noted  At this time pt would cont to benefit from use of BWSS to improve her over mobility status  POC also to cont to focus on transfer trng, NPP interventions for stroke recovery, stroke education, LE strengthening, endurance trng, w/c propulsion, and improving pt's postural control and midline in stance  Family/Caregiver Present dtr and son present at end of session   Problem List Decreased strength;Decreased range of motion;Decreased endurance; Impaired balance;Decreased mobility; Decreased cognition;Decreased coordination; Impaired judgement;Decreased safety awareness; Impaired tone;Pain   Barriers to Discharge Inaccessible home environment;Decreased caregiver support   PT Barriers   Functional Limitation Car transfers; Ramp negotiation;Stair negotiation;Transfers;Standing;Walking; Wheelchair management   Plan   Treatment/Interventions Functional transfer training;LE strengthening/ROM; Therapeutic exercise;Elevations; Endurance training;Patient/family training;Equipment eval/education; Bed mobility;Gait training   Progress Progressing toward goals   Recommendation   PT Discharge Recommendation   (tbd pending progress)   PT Therapy Minutes   PT Time In 1430   PT Time Out 1530   PT Total Time (minutes) 60   PT Mode of treatment - Individual (minutes) 60   PT Mode of treatment - Concurrent (minutes) 0   PT Mode of treatment - Group (minutes) 0   PT Mode of treatment - Co-treat (minutes) 0   PT Mode of Treatment - Total time(minutes) 60 minutes   PT Cumulative Minutes 330   Therapy Time missed Time missed?  No

## 2022-08-23 NOTE — PROGRESS NOTES
Internal Medicine Progress Note  Patient: Sukumar Burgos  Age/sex: 80 y o  female  Medical Record #: [id number]      ASSESSMENT/PLAN: (Interval History)  Sukumar Burgos is seen and examined and management for following issues:    Acute embolic right MCA CVA  · Had near occlusion of right MCA  · Felt to be a Xarelto failure and was switched to Pradaxa  · ECHO w/o thrombus  · Continue statin  · On Baclofen for tone left arm     Chronic atrial fibrillation  · Sees Dr Carolanne Apgar as OP  · Rates controlled  · On Pradaxa now, Xarelto stopped     PPM  · VVI  · Is not MRI conditional     Valvular disease  · Current ECHO:  LVEF 65%, mod AR/mild AS, mod TR with severely elevated RV systolic pressure, mild-mod MR, mod LAE/mild NEO  · Euvolemic currently     HTN  · Home:  Verapamil 240mg qhs/Cozaar 731 mg qd/Bystolic 5mg qd/lasix 91DU qd/Aldactone 25mg qd  · Here:  Verapamil 240mg qhs/Cozaar 244MA qd/Bystolic 5mg qd  · OP note states has left RA stenosis but no testing in OP records or Care Everywhere to verify  · No changes today     HLD  · Home:  Crestor 10mg qd = Neuro rec when discharged to reduce to 5mg qd  · Here:  Pravachol 40mg qd     DARRIUS  · S/p NS 1 liter   · Back to baseline  · Continue to hold Lasix and Aldactone     CAD  · Nonobstructive  · Card cath 2021 done for borderline abn stress test and found 50% mid RCA/40% mid LAD = no intervention done  · Continue statin  · Was not on ASA as an OP     Hypomagnesemia  · Takes here and at home 400 mg MagOx  · Mg level  was 2 4         DC planning: Team       The above assessment and plan was reviewed and updated as determined by my evaluation of the patient on 2022      Labs:   Results from last 7 days   Lab Units 22  0458 22  0515   WBC Thousand/uL 7 33 9 80   HEMOGLOBIN g/dL 12 0 11 8   HEMATOCRIT % 38 6 36 8   PLATELETS Thousands/uL 360 323     Results from last 7 days   Lab Units 22  0458 22  0434   SODIUM mmol/L 134* 134*   POTASSIUM mmol/L 4 7 4 8   CHLORIDE mmol/L 108 108   CO2 mmol/L 22 21   BUN mg/dL 31* 33*   CREATININE mg/dL 1 06 1 09   CALCIUM mg/dL 9 3 9 1                   Review of Scheduled Meds:  Current Facility-Administered Medications   Medication Dose Route Frequency Provider Last Rate    acetaminophen  650 mg Oral Q6H PRN Carly Andre MD      baclofen  5 mg Oral BID Carly Andre MD      bisacodyl  10 mg Rectal Daily PRN Carly Andre MD      dabigatran etexilate  150 mg Oral Q12H [address] Carly Andre MD      losartan  100 mg Oral Daily Carly Andre MD      magnesium oxide  400 mg Oral Daily Carly Andre MD      menthol-methyl salicylate   Apply externally BID Lola Martin MD      nebivolol  5 mg Oral Daily Carly Andre MD      polyethylene glycol  17 g Oral Daily PRN Carly Andre MD      pravastatin  40 mg Oral Daily With Siena Shannon MD      verapamil  240 mg Oral HS Carly Andre MD         Subjective/ HPI: Patient seen and examined  Patients overnight issues or events were reviewed with nursing or staff during rounds or morning huddle session  New or overnight issues include the following:     No new or overnight issues  Offers no complaints    ROS:   A 10 point ROS was performed; negative except as noted above         Imaging:     No orders to display       *Labs /Radiology studies reviewed  *Medications reviewed and reconciled as needed  *Please refer to order section for additional ordered labs studies  *Case discussed with primary attending during morning huddle case rounds    Physical Examination:  Vitals:   Vitals:    08/22/22 0459 08/22/22 1410 08/22/22 2050 08/23/22 0620   BP: 131/56 144/64 127/70 115/53   BP Location: Right arm Right arm Right arm Right arm   Pulse: 60 78 77 60   Resp: 16 20 20 18   Temp: 98 2 °F (36 8 °C) 97 9 °F (36 6 °C) 98 4 °F (36 9 °C) 97 5 °F (36 4 °C)   TempSrc: Oral Oral Oral Oral   SpO2: 96% 95% 96% 97%   Weight:       Height:           General Appearance: no distress, conversive  HEENT: PERRLA, conjuctiva normal; oropharynx clear; mucous membranes moist   Neck:  Supple, normal ROM  Lungs: CTA, normal respiratory effort, no retractions, expiratory effort normal  CV: regular rate and rhythm; no rubs/murmurs/gallops, PMI normal   ABD: soft; ND/NT; +BS  EXT: no edema  Skin: normal turgor, normal texture, no rashes  Psych: affect normal, mood normal  Neuro: AAO      The above physical exam was reviewed and updated as determined by my evaluation of the patient on 8/23/2022  Invasive Devices  Report    Drain  Duration           External Urinary Catheter <1 day                   VTE Pharmacologic Prophylaxis: Pradaxa  Code Status: Level 1 - Full Code  Current Length of Stay: 4 day(s)      Total time spent:  30 minutes with more than 50% spent counseling/coordinating care  Counseling includes discussion with patient re: progress  and discussion with patient of his/her current medical state/information  Coordination of patient's care was performed in conjunction with primary service  Time invested included review of patient's labs, vitals, and management of their comorbidities with continued monitoring  In addition, this patient was discussed with medical team including physician and advanced extenders  The care of the patient was extensively discussed and appropriate treatment plan was formulated unique for this patient  ** Please Note:  voice to text software may have been used in the creation of this document   Although proof errors in transcription or interpretation are a potential of such software**

## 2022-08-23 NOTE — PROGRESS NOTES
[facility] INITIAL CONSULT  NOTE  Oswaldo Hare 80 y o  Rickie Peña female MRN: [id number]  FFS:55/53/32  Unit/Bed#: -01 Encounter: [id number]      Requested by (Physician/Service): Geovanna Suazo MD  Reason for Consultation: Evaluate and treat impact of mood and coping on progress in rehabilitation  HPI: Oswaldo Hare 80year-old female with a past medical history of Afib, presented to [facility] on 8/12/22 with dense left-sided weakness and dysarthria found to be embolic right MCA CVA  This was felt to be a Xarelto failure hence the patient was switched to Pradaxa as anticoagulants Her past medical history is significant for sick sinus syndrome, hypertension, hyperlipidemia, valvular disease, coronary artery disease  She is status post pacemaker placement that is not MRI compatible   She had systolic blood pressure of 200 on arrival   CT head showed focal area of gliosis within the high right post central gyrus and scattered chronic microvascular ischemic changes with more focal lucency along the anterior limb of the right internal capsule  CTA of head and neck showed near occlusive thrombus at the right MCA bifurcation, mild beating of the mid to distal ICA suspicious mild fibromuscular dysplasia and mild atherosclerotic disease at the bilateral distal carotid arteries  She was not a candidate for MRI due to pacemaker  CT cerebral perfusion study showed 8 mL of mismatch  She was not a candidate for tPA due to bleeding risk from being on Xarelto  She was not a thrombectomy candidate for neurointerventional   Patient was recommended to transition to Pradaxa    Patient was evaluated by skilled therapies and was found to have significant decline in ADLs and ambulation and appears appropriate for admission to [facility]           HISTORY:     Patient Active Problem List    Diagnosis Date Noted  Valvular heart disease 08/20/2022    CAD (coronary artery disease) 08/20/2022    At risk for altered urinary elimination 08/20/2022    Neck pain 08/20/2022    Healthcare maintenance 08/20/2022    At risk for coping difficulty 08/20/2022    Anemia 08/19/2022    At risk for venous thromboembolism (VTE) 08/19/2022    DARRIUS (acute kidney injury) ([facility] [address] ) 08/19/2022    Muscle spasticity 08/19/2022    Arterial ischemic stroke, MCA (middle cerebral artery), right, acute ([address] ) 08/12/2022    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 ([facility] [address] ) 08/12/2022    Renal insufficiency     Abnormal nuclear stress test 08/20/2021    Diverticulosis of colon 05/20/2019    Pacemaker 02/26/2018    Tubulovillous adenoma 02/09/2018    Gastroesophageal reflux disease without esophagitis 02/09/2018    Essential hypertension 12/08/2017    Hyperlipidemia LDL goal <100 12/08/2017    Stage 3 chronic kidney disease ([address] ) 12/08/2017    Osteoarthritis of both wrists 12/08/2017    Chronic atrial fibrillation ([facility] [address] ) 12/08/2017    Cavus deformity of foot 06/27/2017    Hallux abducto valgus, bilateral 06/27/2017    Lipoma of right upper extremity 05/17/2017    Pain due to onychomycosis of toenails of both feet 01/20/2017    Allergic rhinitis due to pollen 10/12/2015    Midline cystocele 12/23/2014    Osteoarthritis of hip 07/17/2014    B12 deficiency 12/06/2013    Osteopenia 11/15/2013    Left renal artery stenosis ([facility] [address] ) 08/27/2013    Left atrial enlargement 08/27/2013       Body mass index is 24 8 kg/m²  Past Medical History:     Past Medical History:   Diagnosis Date    A-fib ([facility] [address] )     Anemia     Arthritis     Breast pain, right     Chest pain on breathing     Colon polyp     Cough     Diverticulosis     Encounter for screening colonoscopy     H/O sick sinus syndrome     Heart murmur     High cholesterol     History of atrial fibrillation     Rate ontrolled   On xarelto 15mg (creatinine 50) Followed by Dr Phineas Kussmaul with Cardiology     History of weight loss     Report loose fitting clothes  Weight stable (3lbs difference)  Last colo showed small tubular adenoma x2  Breast biopsy last showed fibrocystic changes  TSH wnl  Will check cbc, bmp, spep   Hx of long term use of blood thinners     Hypertension     Irregular heart beat     Pacemaker     Preop examination     Shortness of breath     Viral bronchitis         Past Surgical History:     Past Surgical History:   Procedure Laterality Date    BREAST BIOPSY Right 08/17/2006    ultrasound guided Percutaneous Needle Core -Benign    CATARACT EXTRACTION      CATARACT EXTRACTION W/ INTRAOCULAR LENS  IMPLANT, BILATERAL      COLONOSCOPY      COLONOSCOPY N/A 5/22/2018    Procedure: COLONOSCOPY;  Surgeon: Cristhian Reed DO;  [facility]; Service: Gastroenterology    Michell Pereira / Gaby Cook / Pura Love Right     as a child after a fall    MAMMO STEREOTACTIC BREAST BIOPSY RIGHT (ALL INC) Right 10/2014    benign    SC CMBND ANTERPOST COLPORRAPHY W/CYSTO N/A 3/17/2016    Procedure: COLPORRHAPHY ANTERIOR POSTERIOR ;  Surgeon: Bonnie Mejia MD;  [facility];  Service: Gynecology    SC COLONOSCOPY FLX DX W/[facility] WHEN PFRMD N/A 4/4/2019    Procedure: COLONOSCOPY;  Surgeon: Cristhian Reed DO;  [facility]; Service: Gastroenterology    SC CYSTOURETHROSCOPY N/A 3/17/2016    Procedure: CYSTOSCOPY;  Surgeon: Bonnie Mejia MD;  [facility];  Service: Gynecology    SC ESOPHAGOGASTRODUODENOSCOPY TRANSORAL DIAGNOSTIC N/A 4/16/2018    Procedure: EGD AND COLONOSCOPY;  Surgeon: Cristhian Reed DO;  [facility];   Service: Gastroenterology    SC LAP,SURG,COLECTOMY, PARTIAL, W/ANAST Right 1/13/2022    Procedure: Diagnostic laparoscopy, laparscopic right colectomy;  Surgeon: Ashley Sorensen MD;  [facility];  Service: Colorectal    SC Sela Mohs PROLAPSE,SACROSP LIG N/A 3/17/2016    Procedure: COLPOPEXY VAGINAL EXTRAPERITONEAL (VEC) ANTERIOR ;  Surgeon: Vee Stanford MD;  [facility];  Service: Gynecology    [place] SLING OPER STRES INCONTINENCE N/A 3/17/2016    Procedure: INSERTION PUBOVAGINAL SLING SINGLE INCISION ;  Surgeon: Vee Stanford MD;  [facility];  Service: Gynecology         Allergies: Allergies   Allergen Reactions    Other Itching     Bandaids    Tape [Medical Tape] Itching         Social History:    Social History     Socioeconomic History    Marital status:      Spouse name: None    Number of children: None    Years of education: None    Highest education level: None   Occupational History    Occupation: retired   Tobacco Use    Smoking status: Never Smoker    Smokeless tobacco: Never Used   Vaping Use    Vaping Use: Never used   Substance and Sexual Activity    Alcohol use: Never    Drug use: No    Sexual activity: Not Currently     Comment:    Other Topics Concern    None   Social History Narrative    Housing, household, and economic circumstances      Social Determinants of Health     Financial Resource Strain: Low Risk     Difficulty of Paying Living Expenses: Not hard at all   Food Insecurity: No Food Insecurity    Worried About Running Out of Food in the Last Year: Never true    [address] in the Last Year: Never true   Transportation Needs: No Transportation Needs    Lack of Transportation (Medical): No    Lack of Transportation (Non-Medical):  No   Physical Activity: Not on file   Stress: Not on file   Social Connections: Not on file   Intimate Partner Violence: Not on file   Housing Stability: Low Risk     Unable to Pay for Housing in the Last Year: No    Number of Places Lived in the Last Year: 1    Unstable Housing in the Last Year: No        Family History:    Family History   Problem Relation Age of Onset    Diabetes Mother     Breast cancer Sister 48    No Known Problems Father     No Known Problems Maternal Grandmother     No Known Problems Maternal Grandfather     No Known Problems Paternal Grandmother     No Known Problems Paternal Grandfather     No Known Problems Daughter     No Known Problems Son     No Known Problems Sister     No Known Problems Sister     No Known Problems Brother     No Known Problems Brother     No Known Problems Sister     No Known Problems Paternal Aunt     No Known Problems Paternal Aunt     No Known Problems Paternal Aunt     No Known Problems Paternal Aunt        Medications:     Current Facility-Administered Medications:     acetaminophen (TYLENOL) tablet 650 mg, 650 mg, Oral, Q6H PRN, Radha Niño MD, 650 mg at 08/22/22 0459    baclofen tablet 5 mg, 5 mg, Oral, BID, Radha Niño MD, 5 mg at 08/23/22 1720    bisacodyl (DULCOLAX) rectal suppository 10 mg, 10 mg, Rectal, Daily PRN, Radha Niño MD    dabigatran etexilate (PRADAXA) capsule 150 mg, 150 mg, Oral, Q12H [address], Radha Niño MD, 150 mg at 08/23/22 3382    losartan (COZAAR) tablet 100 mg, 100 mg, Oral, Daily, Radha Niño MD, 100 mg at 08/23/22 0658    magnesium oxide (MAG-OX) tablet 400 mg, 400 mg, Oral, Daily, Radha Niño MD, 400 mg at 08/23/22 1261    menthol-methyl salicylate (BENGAY) 61-03 % cream, , Apply externally, BID, Deondre Valdez MD    nebivolol (BYSTOLIC) tablet 5 mg, 5 mg, Oral, Daily, Radha Niño MD, 5 mg at 08/23/22 3373    polyethylene glycol (MIRALAX) packet 17 g, 17 g, Oral, Daily PRN, Radha Niño MD    pravastatin (PRAVACHOL) tablet 40 mg, 40 mg, Oral, Daily With Dinner, Radha Niño MD, 40 mg at 08/23/22 1721    verapamil (CALAN-SR) CR tablet 240 mg, 240 mg, Oral, HS, Radha Niño MD, 240 mg at 08/22/22 6589      _________________________________________________________________________________    ASSESSMENT:   Pt reports she woke up with left sided weakness and was not initially aware she was having a stroke  She called her son after 2 hours and he contacted the ambulance to bring her to the ER  She reports she feels much better now and feels she is making progress in rehab  She feels very motivated to make progress because she is left handed and wants to regain her use of her dominant side  Her son was present during this evaluation and he helped provide historical information  Pt reports she has no history of mental health or coping difficulties  She has strong saira in God and states "with saira in God you do not have to fear "  States she lives by this principle  Pt  maintained good eye contact and engaged appropriately throughout the interview  Her affect was bright and she presented as pleasant and cooperative  She established rapport without difficulty  Mood was positive with no evidence of overt depression, euphoria or emotional lability  She denies disturbances in appetite  Reports no difficulty sleeping  No evidence of poor boundaries was present  Pt  denies incidents of losing time or flash backs  No pressured speech, repetitive or perseverative behavior is present  Pt speaks [place] as native language and speaks and understands English when presented slowly  No obsessive-compulsive or associated rituals  Pt's conversational speech was fluent and articulate with no evidence of word finding difficulty  Other than the current stroke, pt reports no major psychosocial stressors  She denies past participation in counseling or psychotherapy and states she effectively managed symptoms  Pt may benefit by counseling during rehab to help maintain positive coping and prevent anxiety or depression  DIAGNOSIS:  Adjustment Disorder with Mixed Emotional Features      RECOMMENDATIONS:   I will follow Ms Sweta Lopez  during her stay to provide the following interventions:    · Supportive psychotherapy, utilizing CBT and mindfulness strategies  · DBT distress tolerance techniques  to improve coping and mood  · Meditation and relaxation training      Thank you for the opportunity to participate in Ms  Anne Marie Alves tera Raymond, Ph D   Licensed Psychologist

## 2022-08-23 NOTE — PROGRESS NOTES
08/23/22 1000   Pain Assessment   Pain Assessment Tool 0-10   Pain Score No Pain   Restrictions/Precautions   Precautions Bed/chair alarms;Cognitive; Fall Risk;Pain;Supervision on toilet/commode   Weight Bearing Restrictions No   ROM Restrictions No   Braces or Orthoses Other (Comment)  (multipodus LLE; RHS LUE)   Eating   Type of Assistance Needed Set-up / clean-up   Physical Assistance Level No physical assistance   Comment due to L UE weakness/decreased coordination   Eating CARE Score 5   Oral Hygiene   Type of Assistance Needed Supervision   Physical Assistance Level No physical assistance   Comment seated in w/c at sink; pt able to manage tooth paste and tooth brush, attempts to brush teeth with L hand as pt is L hand dominant  Oral Hygiene CARE Score 4   Shower/Bathe Self   Type of Assistance Needed Physical assistance   Physical Assistance Level 51%-75%   Comment pt completes bed level LB bathing with assist for bathing rear and L LE  pt then transitioned to w/c and pt completes UB bathing seated in w/c at sink  bed level sponge bathing completed due to extensor tone however pt demo's ability to safely sit in w/c for UB bathing, plan to complete full sponge bathing ADL from w/c level as able next session  Shower/Bathe Self CARE Score 2   Upper Body Dressing   Type of Assistance Needed Physical assistance   Physical Assistance Level 26%-50%   Comment pt able to thread R UE and head with pt initiating threading L UE, requires assist to pull down over L shoulder and adjust over trunk   Upper Body Dressing CARE Score 3   Lower Body Dressing   Type of Assistance Needed Physical assistance   Physical Assistance Level Total assistance   Comment bed level to don tab brief by rolling side to side   total assist to thread pants bed level, rolls side to side to complete CM   Lower Body Dressing CARE Score 1   Putting On/Taking Off Footwear   Type of Assistance Needed Physical assistance   Physical Assistance Level Total assistance   Comment to don socks/AFO and shoe   Putting On/Taking Off Footwear CARE Score 1   Sit to Stand   Type of Assistance Needed Physical assistance   Physical Assistance Level 76% or more   Comment Max A with no R UE support pushing/pulling (instructed pt to "bear hug"); Ax2 when pushing off arm rest as pt significantly goes into extensor pattern; Min assist with L knee block when "pulling" on hallway railing on R side  Sit to Stand CARE Score 2   Bed-Chair Transfer   Type of Assistance Needed Physical assistance   Physical Assistance Level Total assistance   Comment Ax2 for SPT to bed, w/c, recliner  assist to advance L LE during pivot transfer  pt noted with pushing into extensor pattern when pushing off arm rest, however when instructed pt to "bear hug", pt noted with ability to perform STS transfer with max assist, no pushing, no extensor tone  Chair/Bed-to-Chair Transfer CARE Score 1   Toileting Hygiene   Type of Assistance Needed Physical assistance   Physical Assistance Level Total assistance   Comment Ax2 with support from railing of bed in stance and 2nd person providing assist for hygiene/CM   [address] Score 1   Toilet Transfer   Type of Assistance Needed Physical assistance   Physical Assistance Level Total assistance   Comment Ax2 for SPT to [facility]; assist to advance/place LLE   Toilet Transfer CARE Score 1   Functional Standing Tolerance   Comments supported standing tasks completed with R UE support on hallway railing while performing L UE func reaching; L knee block provided however able to maintain upright posture with min/mod VCs for tighening L thigh muscle   ROM - Left Upper Extremities    L Shoulder AROM; Flexion;ABduction   L Elbow AROM;Elbow flexion;Elbow extension   Cognition   Overall Cognitive Status Impaired   Arousal/Participation Alert; Cooperative   Attention Attends with cues to redirect   Orientation Level Oriented X4   Memory Decreased short term memory Following Commands Follows one step commands with increased time or repetition   Additional Activities   Additional Activities Comments pt completes 9 hole peg test using L hand x2 trials with 1st trial being "practice trial" and 2nd trial pt completing in 2:31  Assessed L  strength with pt obtaining 20# of active  strength  Activity Tolerance   Activity Tolerance Patient tolerated treatment well   Assessment   Treatment Assessment pt engages in 90 minute skilled OT session focusing on sponge bathing routine, func transfers, standing horacio/bal and LUE NMR  see above for full func details  pt continues to demo Ax2 for transfers however pt is demo'ing sit pivot/SPT this date in OT session vs SB (still recommending SBT with nursing staff for safety reasons); pt noted with max Ax1 for sit pivot to R side and total assist Ax1 and min Ax1 for SPT to L side (more so assist for L LE advancement/placement)  Pt demo's ability to use L UE in func tasks such as brushing teeth, donning, shirt, washing UB and performing func reach retrieving items with shoulder height however does remain limited by shoulder flexion only obtaining 75 degrees of AROM  pt able to complete 9 hole peg test and demo's 20# of active  strength in L hand  pt remains limited by impaired sitting balance, standing tolerance/balance, decreased L UE strengthening/coordination/ROM, tone, slight impulsivity with transfers, warranting continued skilled care to focus on above and decrease burden of care at d/c  Prognosis Fair   Problem List Decreased strength;Decreased range of motion;Decreased endurance; Impaired balance;Decreased mobility; Decreased cognition;Decreased coordination; Impaired judgement;Decreased safety awareness; Impaired tone;Pain   Barriers to Discharge Inaccessible home environment;Decreased caregiver support   Plan   Treatment/Interventions ADL retraining;Functional transfer training; Therapeutic exercise; Endurance training;Cognitive reorientation;Patient/family training;Equipment eval/education; Compensatory technique education   OT Therapy Minutes   OT Time In 1000   OT Time Out 1130   OT Total Time (minutes) 90   OT Mode of treatment - Individual (minutes) 90   OT Mode of treatment - Concurrent (minutes) 0   OT Mode of treatment - Group (minutes) 0   OT Mode of treatment - Co-treat (minutes) 0   OT Mode of Treatment - Total time(minutes) 90 minutes   OT Cumulative Minutes 210   Therapy Time missed   Time missed?  No

## 2022-08-23 NOTE — PCC NURSING
Acute embolic right MCA CVA - Had near occlusion of right MCA, Felt to be a Xarelto failure and was switched to Pradaxa, ECHO w/o thrombus, Continue statin, Baclofen discontinued 2/2 below, On 9/2, 9/5 and 9/6 states she is saying a specific word that means nothing  She would repeat a particular word that is not really a word and she was aware  She would speak fluently about it at the same time  Her neuro exam was unchanged  Neuro saw, EEG normal/CTH negative for new findings  Neurology suspects the episodes are not neurologic in nature and does not recommend any further neurologic work-up  Chronic atrial fibrillation - Sees Dr Toby Pfeiffer as OP, Rates controlled, Cont Pradaxa/Bystolic  PPM - VVI, Is not MRI conditional, 100% paced on EKG  Valvular disease - Current ECHO:  LVEF 65%, mod AR/mild AS, mod TR with severely elevated RV systolic pressure, mild-mod MR, mod LAE/mild NEO, Euvolemic currently  HTN - Home:  Verapamil 240mg qhs/Cozaar 091 mg qd/Bystolic 5mg qd/Lasix 17IQ qd/Aldactone 25mg qd, Here:  Verapamil 240mg qhs/Cozaar 27RK qd/Bystolic 5mg qd,OP note states has left RA stenosis but no testing in OP records or Care Everywhere to verify, No changes today  HLD - Home:  Crestor 10mg qd = Neuro rec when discharged to reduce to 5mg qd, Here:  Pravachol 40mg qd  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 - S/p NS 1 liter, Back to baseline = as OP GFR 50's to 60's  GFR here 40's to 60's, Continue to hold Lasix and Aldactone for now  CAD - Nonobstructive, Card cath 8/2021 done for borderline abn stress test and found 50% mid RCA/40% mid LAD = no intervention done, Continue statin, Was not on ASA as an OP  Hypomagnesemia - Cont 400 mg MagOx, stable, Left chest pain - Had left neck pain (not new for her) then pain under left breast 8/31/22 = reproducible with palpation, Cardiac w/u negative, Etio was her left arm spasticity, 2/2 developing odd behaviors over her time here in [facility] so Baclofen reduced and then discontinued   Cough  - CXR negative 8/25, Flonase was not effective, Has scheduled Tessalon perles 200mg TID/Chlorarseptic spray/pepcid/Mucinex, Started z-pack 9/2 in case has tracheobronchitis which she had a x3 days and stopped when Keflex started by PMR for asymp bacteruria, Cozaar likely not the cause since has been on for a long time, Was saying that it felt like something in throat and was constantly spitting out her saliva = s/p VBS 9/6 w/o acute findings, May have some thrush as below but overall likely was behavioral and has almost totally subsided, Will reduce Tessalon perles to 100mg TID with eventual d/c or prn  Possible thrush - On tongue may have had some mild thrush, If so, then could have some esophagitis from it as well, S/p empiric Mycostatin  Urine retention - Improved, Management per PMR, Flomax added on 8/31/22, Urine culture >60,000-69,000 Proteus = +asymp --> PMR tx'd with Keflex empirically x 7 days, completed 9/12  Vaginal yeast infection - S/p Monistat pack/Diflucan 150mg x 1  Pt is continent of both bowel and bladder  Pt requires alarms for safety  This week we will continue to encourage independence with ADLs while monitoring labs and vitals and maintaining skin integrity  We will continue to keep the patient free from falls by maintaining safety precautions with patient education and safe transfers  We will continue to assess the patient's pain and medicate accordingly  We will continue to monitor for constipation and medicate per bowel protocol

## 2022-08-23 NOTE — PROGRESS NOTES
08/23/22 1000   Pain Assessment   Pain Assessment Tool 0-10   Pain Score No Pain   Restrictions/Precautions   Precautions Bed/chair alarms;Cognitive; Fall Risk;Pain;Supervision on toilet/commode   Weight Bearing Restrictions No   ROM Restrictions No   Braces or Orthoses Other (Comment)  (multipodus LLE; RHS LUE)   Eating   Type of Assistance Needed Set-up / clean-up   Physical Assistance Level No physical assistance   Comment due to L UE weakness/decreased coordination   Eating CARE Score 5   Oral Hygiene   Type of Assistance Needed Supervision   Physical Assistance Level No physical assistance   Comment seated in w/c at sink; pt able to manage tooth paste and tooth brush, attempts to brush teeth with L hand as pt is L hand dominant  Oral Hygiene CARE Score 4   Shower/Bathe Self   Type of Assistance Needed Physical assistance   Physical Assistance Level 51%-75%   Comment pt completes bed level LB bathing with assist for bathing rear and L LE  pt then transitioned to w/c and pt completes UB bathing seated in w/c at sink  bed level sponge bathing completed due to extensor tone however pt demo's ability to safely sit in w/c for UB bathing, plan to complete full sponge bathing ADL from w/c level as able next session  Shower/Bathe Self CARE Score 2   Upper Body Dressing   Type of Assistance Needed Physical assistance   Physical Assistance Level 26%-50%   Comment pt able to thread R UE and head with pt initiating threading L UE, requires assist to pull down over L shoulder and adjust over trunk   Upper Body Dressing CARE Score 3   Lower Body Dressing   Type of Assistance Needed Physical assistance   Physical Assistance Level Total assistance   Comment bed level to don tab brief by rolling side to side   total assist to thread pants bed level, rolls side to side to complete CM   Lower Body Dressing CARE Score 1   Putting On/Taking Off Footwear   Type of Assistance Needed Physical assistance   Physical Assistance Level Total assistance   Comment to don socks/AFO and shoe   Putting On/Taking Off Footwear CARE Score 1   Sit to Stand   Type of Assistance Needed Physical assistance   Physical Assistance Level 76% or more   Comment Max A with no R UE support pushing/pulling (instructed pt to "bear hug"); Ax2 when pushing off arm rest as pt significantly goes into extensor pattern; Min assist with L knee block when "pulling" on hallway railing on R side  Sit to Stand CARE Score 2   Bed-Chair Transfer   Type of Assistance Needed Physical assistance   Physical Assistance Level Total assistance   Comment Ax2 for SPT to bed, w/c, recliner  assist to advance L LE during pivot transfer  pt noted with pushing into extensor pattern when pushing off arm rest, however when instructed pt to "bear hug", pt noted with ability to perform STS transfer with max assist, no pushing, no extensor tone  Chair/Bed-to-Chair Transfer CARE Score 1   Toileting Hygiene   Type of Assistance Needed Physical assistance   Physical Assistance Level Total assistance   Comment Ax2 with support from railing of bed in stance and 2nd person providing assist for hygiene/CM   [place] 83 Score 1   Toilet Transfer   Type of Assistance Needed Physical assistance   Physical Assistance Level Total assistance   Comment Ax2 for SPT to [facility]; assist to advance/place LLE   Toilet Transfer CARE Score 1   Functional Standing Tolerance   Comments supported standing tasks completed with R UE support on hallway railing while performing L UE func reaching; L knee block provided however able to maintain upright posture with min/mod VCs for tighening L thigh muscle   ROM - Left Upper Extremities    L Shoulder AROM; Flexion;ABduction   L Elbow AROM;Elbow flexion;Elbow extension   Coordination   Gross Motor in stance with RUE support on railing, pt engages in func reaching tasks using L UE to grasp and retrieve items in various planes mostly focusing on shoulder flexion, horizontal shoulder abd and elbow extension  pt tolerates well with inc time to perform AROM  pt completes in stance so rest breaks provided due to standing fatigue  Cognition   Overall Cognitive Status Impaired   Arousal/Participation Alert; Cooperative   Attention Attends with cues to redirect   Orientation Level Oriented X4   Memory Decreased short term memory   Following Commands Follows one step commands with increased time or repetition   Additional Activities   Additional Activities Comments pt completes 9 hole peg test using L hand x2 trials with 1st trial being "practice trial" and 2nd trial pt completing in 2:31  Assessed L  strength with pt obtaining 20# of active  strength  Activity Tolerance   Activity Tolerance Patient tolerated treatment well   Assessment   Treatment Assessment pt engages in 90 minute skilled OT session focusing on sponge bathing routine, func transfers, standing horacio/bal and LUE NMR  see above for full func details  pt completed partial bed level ADL this date due to extensor tone with UB self care completed in w/c at sink  pt demo's ability to tolerate sitting in w/c well during UB self care so plan to complete full sponge bathing routine seated in w/c at sink tomorrow, with goal to eventually complete shower routine  pt continues to demo Ax2 for transfers however pt is demo'ing sit pivot/SPT this date in OT session vs SB (still recommending SBT with nursing staff for safety reasons); pt noted with max Ax1 for sit pivot to R side and total assist Ax1 and min Ax1 for SPT to L side (more so assist for L LE advancement/placement)  Pt demo's ability to use L UE in func tasks such as brushing teeth, donning, shirt, washing UB and performing func reach retrieving items with shoulder height however does remain limited by shoulder flexion only obtaining 75 degrees of AROM  pt able to complete 9 hole peg test and charlotte's 20# of active  strength in L hand   pt remains limited by impaired sitting balance, standing tolerance/balance, decreased L UE strengthening/coordination/ROM, tone, slight impulsivity with transfers, warranting continued skilled care to focus on above and decrease burden of care at d/c  Prognosis Fair   Problem List Decreased strength;Decreased range of motion;Decreased endurance; Impaired balance;Decreased mobility; Decreased cognition;Decreased coordination; Impaired judgement;Decreased safety awareness; Impaired tone;Pain   Barriers to Discharge Inaccessible home environment;Decreased caregiver support   Plan   Treatment/Interventions ADL retraining;Functional transfer training; Therapeutic exercise; Endurance training;Cognitive reorientation;Patient/family training;Equipment eval/education; Compensatory technique education   OT Therapy Minutes   OT Time In 1000   OT Time Out 1130   OT Total Time (minutes) 90   OT Mode of treatment - Individual (minutes) 90   OT Mode of treatment - Concurrent (minutes) 0   OT Mode of treatment - Group (minutes) 0   OT Mode of treatment - Co-treat (minutes) 0   OT Mode of Treatment - Total time(minutes) 90 minutes   OT Cumulative Minutes 210   Therapy Time missed   Time missed?  No

## 2022-08-23 NOTE — PROGRESS NOTES
08/23/22 1300   Pain Assessment   Pain Assessment Tool 0-10   Pain Score No Pain   Restrictions/Precautions   Precautions Bed/chair alarms;Cognitive; Fall Risk;Pain;Supervision on toilet/commode   Weight Bearing Restrictions No   ROM Restrictions No   Braces or Orthoses   (L multipodus boot; L AFO during ambulation; RHS LUE)   Subjective   Subjective Pt agreeable to skilled PT tx, denies pain   Sit to Stand   Type of Assistance Needed Physical assistance   Physical Assistance Level 76% or more   Comment maxAx1 from w/c pt pushing with RUE from chair causing pt to go into extensor tone   Sit to Stand CARE Score 2   Bed-Chair Transfer   Type of Assistance Needed Physical assistance   Physical Assistance Level 76% or more   Comment mod-maxAx1 sit pivot transfer to L and R side   Chair/Bed-to-Chair Transfer CARE Score 2   Car Transfer   Reason if not Attempted Environmental limitations   Car Transfer CARE Score 10   Therapeutic Interventions   Flexibility hamstring/gastroc stretch performed b/l 5'   Neuromuscular Re-Education D1 LLE PNF pattern performed 1', no tone or rigidity noted with movement in sitting  Unable to flex L Knee/hip in standing however   Assessment   Treatment Assessment 30 min skilled PT tx focusing on transfer trng, LE ROM and attempting step taps as part of NPP interventions  Pt requiring mod-maxAx1 for sit pivot transfers this session to L and R side with use of L AFO  Performed b/l LE stretching with focus on LLE as pt with tightness noted in b/l hamstrings  Did trial LLE step taps however pt unable to flex L knee in stance despite assist for weight shifting and maxAx1 for LLE movement  Pt sat down in w/c and D1 LE PNF pattern performed to LLE for 1 minute in which movement of knee and hip noted, no spasticity or tone noted  Can trial step taps in future but due to time purposes not trialed again this session  Plan to trial BWSS in next session     PT Therapy Minutes   PT Time In 1300   PT Time Out 1330   PT Total Time (minutes) 30   PT Mode of treatment - Individual (minutes) 30   PT Mode of treatment - Concurrent (minutes) 0   PT Mode of treatment - Group (minutes) 0   PT Mode of treatment - Co-treat (minutes) 0   PT Mode of Treatment - Total time(minutes) 30 minutes   PT Cumulative Minutes 300   Therapy Time missed   Time missed?  No

## 2022-08-23 NOTE — PROGRESS NOTES
08/23/22 1330   Pain Assessment   Pain Assessment Tool 0-10   Pain Score No Pain   Restrictions/Precautions   Precautions Bed/chair alarms;Cognitive; Fall Risk;Pain;Supervision on toilet/commode   Weight Bearing Restrictions No   ROM Restrictions No   Cognitive Linguisitic Assessments   Cognitive Linquistic Quick Test (CLQT) Informal Cognitive Linguistic Assessment Tool      Pt completed additional portions of the Informal Cognitive Linguistic Evaluation Tool  [place] was completed [place] [facility] [place] and [place] per pt request though pt's primary language [place] [facility] [place] [facility] [place]  Translation assisted using [employer] ashlyn to which pt stated "I like this better because on the other one I can't hear"  The following portions were completed this date:     Sequencing: Putting 3 words in correct progressive order: 3/3    Problem Solving: (verbal scenarios with "what would you do" format) 5/5    Basic Math: (addition and subtraction) 4/4     Following verbal commands (1, 2, and 3 step): 3/3    Following written commands (1, 2, and 3 step): 3/3   Comprehension   Comprehension (FIM) 4 - Understands basic info/conversation 75-90% of time   Expression   Expression (FIM) 4 - Expresses basic info/needs 75-90% of time   Social Interaction   Social Interaction (FIM) 5 - Interacts appropriately with others 90% of time   Problem Solving   Problem solving (FIM) 4 - Solves basic problems 75-89% of time   Memory   Memory (FIM) 4 - Recognizes/recalls/performs 75-89%   Speech/Language/Cognition Assessmetn   Treatment Assessment Pt completed remaining portions of Informal Cognitive Linguistic Assessment tool- see above for details  Pt pleasant and agreeable for session and completed using combination English and Syriac using Google translate as needed but overall appears with increased comprehension when provided slow, basic speech and repetition for increased context as needed   Pt overall appears with functional basic comprehension and expression and possibly mild memory, recall, and safety in regards to stroke recovery and prevention  Plan to touch base with family in regards to baseline cognitive skills suspect degree of language barrier impacting true understanding of pt's current skills  At this time, pt is recommended for continued skilled SLP services to maximize overall cognitive linguistic skills for increased level of independence and safety and to decrease caregiver burden on discharge  SLP Therapy Minutes   SLP Time In 1330   SLP Time Out 1400   SLP Total Time (minutes) 30   SLP Mode of treatment - Individual (minutes) 30   SLP Mode of treatment - Concurrent (minutes) 0   SLP Mode of treatment - Group (minutes) 0   SLP Mode of treatment - Co-treat (minutes) 0   SLP Mode of Treatment - Total time(minutes) 30 minutes   SLP Cumulative Minutes 120   Therapy Time missed   Time missed?  No

## 2022-08-23 NOTE — PLAN OF CARE
Problem: MOBILITY - ADULT  Goal: Maintain or return to baseline ADL function  Description: INTERVENTIONS:  -  Assess patient's ability to carry out ADLs; assess patient's baseline for ADL function and identify physical deficits which impact ability to perform ADLs (bathing, care of mouth/teeth, toileting, grooming, dressing, etc )  - Assess/evaluate cause of self-care deficits   - Assess range of motion  - Assess patient's mobility; develop plan if impaired  - Assess patient's need for assistive devices and provide as appropriate  - Encourage maximum independence but intervene and supervise when necessary  - Involve family in performance of ADLs  - Assess for home care needs following discharge   - Consider OT consult to assist with ADL evaluation and planning for discharge  - Provide patient education as appropriate  Outcome: Progressing  Goal: Maintains/Returns to pre admission functional level  Description: INTERVENTIONS:  - Perform BMAT or MOVE assessment daily    - Set and communicate daily mobility goal to care team and patient/family/caregiver  - Collaborate with rehabilitation services on mobility goals if consulted  - Perform Range of Motion  times a day  - Reposition patient every  hours    - Dangle patient  times a day  - Stand patient  times a day  - Ambulate patient  times a day  - Out of bed to chair  times a day   - Out of bed for meals times a day  - Out of bed for toileting  - Record patient progress and toleration of activity level   Outcome: Progressing     Problem: Prexisting or High Potential for Compromised Skin Integrity  Goal: Skin integrity is maintained or improved  Description: INTERVENTIONS:  - Identify patients at risk for skin breakdown  - Assess and monitor skin integrity  - Assess and monitor nutrition and hydration status  - Monitor labs   - Assess for incontinence   - Turn and reposition patient  - Assist with mobility/ambulation  - Relieve pressure over bony prominences  - Avoid friction and shearing  - Provide appropriate hygiene as needed including keeping skin clean and dry  - Evaluate need for skin moisturizer/barrier cream  - Collaborate with interdisciplinary team   - Patient/family teaching  - Consider wound care consult   Outcome: Progressing     Problem: Potential for Falls  Goal: Patient will remain free of falls  Description: INTERVENTIONS:  - Educate patient/family on patient safety including physical limitations  - Instruct patient to call for assistance with activity   - Consult OT/PT to assist with strengthening/mobility   - Keep Call bell within reach  - Keep bed low and locked with side rails adjusted as appropriate  - Keep care items and personal belongings within reach  - Initiate and maintain comfort rounds  - Make Fall Risk Sign visible to staff  - Offer Toileting every  Hours, in advance of need  - Initiate/Maintain alarm  - Obtain necessary fall risk management equipment:   - Apply yellow socks and bracelet for high fall risk patients  - Consider moving patient to room near nurses station  Outcome: Progressing     Problem: PAIN - ADULT  Goal: Verbalizes/displays adequate comfort level or baseline comfort level  Description: Interventions:  - Encourage patient to monitor pain and request assistance  - Assess pain using appropriate pain scale  - Administer analgesics based on type and severity of pain and evaluate response  - Implement non-pharmacological measures as appropriate and evaluate response  - Consider cultural and social influences on pain and pain management  - Notify physician/advanced practitioner if interventions unsuccessful or patient reports new pain  Outcome: Progressing     Problem: INFECTION - ADULT  Goal: Absence or prevention of progression during hospitalization  Description: INTERVENTIONS:  - Assess and monitor for signs and symptoms of infection  - Monitor lab/diagnostic results  - Monitor all insertion sites, i e  indwelling lines, tubes, and drains  - Monitor endotracheal if appropriate and nasal secretions for changes in amount and color  - Dorothy appropriate cooling/warming therapies per order  - Administer medications as ordered  - Instruct and encourage patient and family to use good hand hygiene technique  - Identify and instruct in appropriate isolation precautions for identified infection/condition  Outcome: Progressing     Problem: SAFETY ADULT  Goal: Patient will remain free of falls  Description: INTERVENTIONS:  - Educate patient/family on patient safety including physical limitations  - Instruct patient to call for assistance with activity   - Consult OT/PT to assist with strengthening/mobility   - Keep Call bell within reach  - Keep bed low and locked with side rails adjusted as appropriate  - Keep care items and personal belongings within reach  - Initiate and maintain comfort rounds  - Make Fall Risk Sign visible to staff  - Offer Toileting every  Hours, in advance of need  - Initiate/Maintain alarm  - Obtain necessary fall risk management equipment:   - Apply yellow socks and bracelet for high fall risk patients  - Consider moving patient to room near nurses station  Outcome: Progressing  Goal: Maintain or return to baseline ADL function  Description: INTERVENTIONS:  -  Assess patient's ability to carry out ADLs; assess patient's baseline for ADL function and identify physical deficits which impact ability to perform ADLs (bathing, care of mouth/teeth, toileting, grooming, dressing, etc )  - Assess/evaluate cause of self-care deficits   - Assess range of motion  - Assess patient's mobility; develop plan if impaired  - Assess patient's need for assistive devices and provide as appropriate  - Encourage maximum independence but intervene and supervise when necessary  - Involve family in performance of ADLs  - Assess for home care needs following discharge   - Consider OT consult to assist with ADL evaluation and planning for discharge  - Provide patient education as appropriate  Outcome: Progressing  Goal: Maintains/Returns to pre admission functional level  Description: INTERVENTIONS:  - Perform BMAT or MOVE assessment daily    - Set and communicate daily mobility goal to care team and patient/family/caregiver  - Collaborate with rehabilitation services on mobility goals if consulted  - Perform Range of Motion  times a day  - Reposition patient every  hours    - Dangle patient  times a day  - Stand patient  times a day  - Ambulate patient  times a day  - Out of bed to chair  times a day   - Out of bed for meals  times a day  - Out of bed for toileting  - Record patient progress and toleration of activity level   Outcome: Progressing     Problem: DISCHARGE PLANNING  Goal: Discharge to home or other facility with appropriate resources  Description: INTERVENTIONS:  - Identify barriers to discharge w/patient and caregiver  - Arrange for needed discharge resources and transportation as appropriate  - Identify discharge learning needs (meds, wound care, etc )  - Arrange for interpretive services to assist at discharge as needed  - Refer to Case Management Department for coordinating discharge planning if the patient needs post-hospital services based on physician/advanced practitioner order or complex needs related to functional status, cognitive ability, or social support system  Outcome: Progressing

## 2022-08-23 NOTE — PCC CARE MANAGEMENT
Pt requires continued subacute setting on dc due to minimal support at home  Family  has received options and referrals will be placed today

## 2022-08-23 NOTE — PROGRESS NOTES
Physical Medicine and Rehabilitation Progress Note  William Winston 80 y o  female MRN: [id number]  Unit/Bed#: -23 Encounter: [id number]    HPI: 45-year-old female with a past medical history of AFib recently on Xarelto, sick sinus syndrome status status post pacemaker placement that is not MRI compatible, hypertension, hyperlipidemia, valvular disease, coronary artery disease developed dense left-sided weakness, dysarthria and presented to the hospital on 08/12/2022  She had systolic blood pressure of 200 on arrival   CT head showed focal area of gliosis within the high right post central gyrus and scattered chronic microvascular ischemic changes with more focal lucency along the anterior limb of the right internal capsule  CTA of head and neck showed near occlusive thrombus at the right MCA bifurcation, mild beating of the mid to distal ICA suspicious mild fibromuscular dysplasia and mild atherosclerotic disease at the bilateral distal carotid arteries  She was not a candidate for MRI due to pacemaker  CT cerebral perfusion study showed 8 mL of mismatch  She was not a candidate for tPA due to bleeding risk from being on Xarelto  She was not a thrombectomy candidate for neurointerventional   Patient was recommended to transition to Pradaxa  Patient was evaluated by skilled therapies and was found to have significant decline in ADLs and ambulation and appears appropriate for admission to [place] [address]  Chief Complaint: L shoulder feeling better  Interval History/Subjective:  No acute events overnight  L shoulder and neck feeling much better  She is able to flex her shoulder much easier, still weak in abduction  Still tight in elbow flexors  Denies any CP, SOB, fevers, chills, N/V, abdominal pain  Last  8/21  ROS:  A 10 point review of systems was negative except for what is noted in the HPI  Today's Changes:  1  Hold on increasing Baclofen for now  Monitor   ROM with therapies  2  Not sure there is a role for dynasplint quite yet  3  Team Conference today  Total time spent:  35 minutes, with more than 50% spent counseling/coordinating care  Counseling includes discussion with patient re: progress in therapies, functional issues observed by therapy staff, and discussion with patient his/her current medical state/wellbeing  Coordination of patient's care was performed in conjunction with Internal Medicine service to monitor patient's labs, vitals, and management of their comorbidities  In addition, this patient was discussed by the interdisciplinary team in weekly case conference today  The care of the patient was extensively discussed with all care providers and an appropriate rehabilitation plan was formulated unique for this patient  Barriers were identified preventing progression of therapy and appropriate interventions were discussed with each discipline  Please see the team note for input from all disciplines regarding barriers, intervention, and discharge planning  Assessment/Plan:    * Arterial ischemic stroke, MCA (middle cerebral artery), right, acute (HCC)  Assessment & Plan  - R MCA CVA   - CTA head/neck - near occlusive thrombus at the R MCA bifurcation, mild beading of the mid to distal ICAs suspicious for mild fibromuscular dysplasia, and mild atherosclerotic disease at bilateral distal carotid arteries     - no MRI because pacemaker not compatible    - Residual impairments on admission to [facility]: L spastic hemiparesis, bowel/bladder dysfunction, possible cog impairments (assess for other impairments during [facility] course)     Secondary stroke prevention  - repeat CT angio head and neck in 3 months to re-evaluate thrombus is a outpatient  - Antithrombotic: Pradaxa (switched from Xarelto for possible failure)   - Statin  - Optimal management of blood pressure and diabetes  - Patient at increased risk for stroke particularly early in post-stroke period - monitor neuro exam closely with low threshold to repeat imaging  -  patient and if applicable caregiver on optimal stroke management  - Follow-up with neurology and PCP after d/c   - Recommend acute comprehensive interdisciplinary inpatient rehabilitation to include intensive skilled therapies (PT, OT, ST) as outlined with oversight and management by rehabilitation physician as well as inpatient rehab level nursing, case management and weekly interdisciplinary team meetings  - patient does have fair left hand and wrist strength; can use resting hand splint for a few hours during the day and overnight but to not overuse and encourage use hand and wrist  - multi Podus boot on left foot patient can breaks to potentially decrease risk of contracture/skin breakdown   - patient has spastic left upper extremity making subluxation less likely although continue subluxation precautions      At risk for coping difficulty  Assessment & Plan  Supportive counseling  Psychology consult while in [facility]  Discharged on magnesium daily   Continue for now  Monitor level  Neck pain  Assessment & Plan  With taut muscle bands in left cervical paraspinal and trapezius areas  Gentle stretching  Baclofen 5 mg b i d  > titrating up as appropriate    PRN APAP  Consider K-pad or other topicals    At risk for altered urinary elimination  Assessment & Plan  Patient has been using ex-cath suction device - Pure-wick PTA to Arc   - She would like to continue at least at night until mobility improves given risk of accidents and impaired hygiene  - She feels like she does have some control  - Continue just at night for now until mobility improves  - monitor for retention, incontinence, signs/symptoms of UTI  - ensure optimal bowel management   - recommend toileting program Q2-4 H during day and Q4-6H overnight   - bladder scan Q4H, monitor output and PVRs, straight cath >450 or if uncomfortable 371-106      CAD (coronary artery disease)  Assessment & Plan  IM consulted and with overall management at their discretion during [facility] course  Antithrombotic: Pradaxa  Statin  Optimal blood pressure   Outpatient Cardiology follow-up    Per IM  · "Nonobstructive  · Card cath 8/2021 done for borderline abn stress test and found 50% mid RCA/40% mid LAD = no intervention done  · Continue statin  · Was not on ASA as an OP"       Valvular heart disease  Assessment & Plan  Per IM  · "Current ECHO:  LVEF 65%, mod AR/mild AS, mod TR with severely elevated RV systolic pressure, mild-mod MR, mod LAE/mild NEO  · Euvolemic currently but will watch since CTA on admission showed pulm edema and b/l pleural effusions"  -diuretics at their discretion   -Outpatient cardiology follow-up    Muscle spasticity  Assessment & Plan  Left upper extremity significantly (also some L neck tightness/spasms)  Modified Sada scale 3 in shoulder, and 2 in her elbow  Baclofen 5mg BID trial at low dose  She is a bit tired, monitor on this dosage given kidney function     Gentle range of motion with therapy  Patient does have movement of the hand and wrist and do want to encourage that - can still consider resting hand splint intermittently  Consider  Botox  Skilled PT OT  Optimal skin hygiene    DARRIUS (acute kidney injury) [facility])  Assessment & Plan  8/22 Crea back to baseline 1 06  Medicine consulted  Hold Lasix and Aldactone and repeat BMP in the morning  Resume diuretics at discretion of medicine  Monitor labs and output    At risk for venous thromboembolism (VTE)  Assessment & Plan  SCDs, ambulation, and Pradaxa       Pacemaker  Assessment & Plan  Not MRI compatible    Chronic atrial fibrillation (HCC)  Assessment & Plan  IM consulted and with overall management at their discretion during [facility] course  Rate control: Verapamil, Nebivolol   Antithrombotic: Pradaxa       Stage 3 chronic kidney disease [facility])  Assessment & Plan  Lab Results   Component Value Date    EGFR 49 08/22/2022    EGFR 47 08/21/2022    EGFR 35 08/20/2022    CREATININE 1 06 08/22/2022    CREATININE 1 09 08/21/2022    CREATININE 1 39 (H) 08/20/2022     Given IVF  Crea now back down to ~1 06 near baseline  Avoid nephrotoxic meds  Monitor voiding status  IM consulted  Hyperlipidemia LDL goal <100  Assessment & Plan  Statin    Essential hypertension  Assessment & Plan  Internal medicine consulted and co-management with their service  Monitor vitals with and without activity; monitor for orthostasis  Monitor hemoglobin, electrolytes, kidney function, hydration status   Current meds:  Losartan, nebivolol, Verapamil 240mg daily  - Lasix and Aldactone on hold        Health Maintenance  #Delirium/Sleep: At risk  Environmental interventions  Optimize pain, bowel, bladder, sleep-wake cycle management  #Pain: Tylenol PRN  #Bowel: Last BM 8/21 and continent  Not on regimen - only PRNs  #Bladder: Voiding and mostly continent  Purewick at night to transition once more mobile  #Skin/Pressure Injury Prevention: Turn Q2hr in bed, with weight shifts [id number] in wheelchair  #DVT Prophylaxis: Fully anticoagulated on pradaxa  #GI Prophylaxis: PO diet   #Code Status: Full Code  #FEN: Reg/Thins  #Dispo: Team Conference 8/23: JOHNNY 3-4 weeks  Plan to reteam      Objective:    Functional Update:  PT: maxA transfers, mod-maxA bed mobility, totalA x2 ambulation 30', modA wheelchair mobility 150'   OT: Sup eating, maxA grooming, Ax2 bathing, maxA UB dressing, Ax2 LB dressing, Ax2 toileting and toilet transfers  SLP: mild cognitive impairment  Allergies per EMR    Physical Exam:  Temp:  [97 5 °F (36 4 °C)-98 4 °F (36 9 °C)] 97 5 °F (36 4 °C)  HR:  [60-78] 60  Resp:  [18-20] 18  BP: (115-144)/(53-70) 115/53  Oxygen Therapy  SpO2: 97 %    Gen: No acute distress, Well-nourished, well-appearing  HEENT: Moist mucus membranes, Normocephalic/Atraumatic  Cardiovascular: Regular rate, rhythm, S1/S2   Distal pulses palpable  Heme/Extr: No edema  Pulmonary: Non-labored breathing  Lungs CTAB  : No contreras  GI: Soft, non-tender, non-distended  MSK: Decreased PROM in shoulder abduction, but does have some activation  Improved ROM shoulder flexion to at least neutral  Able to get full elbow flexion and elbow extension  Tightness in L upper trapezius has improved  Integumentary: Skin is warm, dry  Neuro: AAOx3, Speech is intact  Appropriate to questioning  MAS 3 in shoulder and Mas 1+-2 in elbow flexors  Psych: Normal mood and affect  Diagnostic Studies: Reviewed, no new imaging      Laboratory:  Reviewed   Results from last 7 days   Lab Units 08/22/22  0458 08/17/22  0515   HEMOGLOBIN g/dL 12 0 11 8   HEMATOCRIT % 38 6 36 8   WBC Thousand/uL 7 33 9 80     Results from last 7 days   Lab Units 08/22/22  0458 08/21/22  0434 08/20/22  0522   BUN mg/dL 31* 33* 42*   POTASSIUM mmol/L 4 7 4 8 4 7   CHLORIDE mmol/L 108 108 104   CREATININE mg/dL 1 06 1 09 1 39*            Patient Active Problem List   Diagnosis    Essential hypertension    Hyperlipidemia LDL goal <100    Stage 3 chronic kidney disease (HCC)    Osteoarthritis of both wrists    Chronic atrial fibrillation (HCC)    Tubulovillous adenoma    Gastroesophageal reflux disease without esophagitis    Pacemaker    B12 deficiency    Allergic rhinitis due to pollen    Cavus deformity of foot    Midline cystocele    Hallux abducto valgus, bilateral    Left renal artery stenosis (HCC)    Osteoarthritis of hip    Osteopenia    Pain due to onychomycosis of toenails of both feet    Left atrial enlargement    Lipoma of right upper extremity    Diverticulosis of colon    Abnormal nuclear stress test    Renal insufficiency    Arterial ischemic stroke, MCA (middle cerebral artery), right, acute (HCC)    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)    Anemia    At risk for venous thromboembolism (VTE)    DARRIUS (acute kidney injury) ([facility] [address] )  Muscle spasticity    Valvular heart disease    CAD (coronary artery disease)    At risk for altered urinary elimination    Neck pain    Healthcare maintenance    At risk for coping difficulty         Medications  Current Facility-Administered Medications   Medication Dose Route Frequency Provider Last Rate    acetaminophen  650 mg Oral Q6H PRN Daisy Villegas MD      baclofen  5 mg Oral BID Daisy Villegas MD      bisacodyl  10 mg Rectal Daily PRN Daisy Villegas MD      dabigatran etexilate  150 mg Oral Q12H [address] Daisy Villegas MD      losartan  100 mg Oral Daily Daisy Villegas MD      magnesium oxide  400 mg Oral Daily Daisy Villegas MD      menthol-methyl salicylate   Apply externally BID Brenda Carcamo MD      nebivolol  5 mg Oral Daily Daisy Villegas MD      polyethylene glycol  17 g Oral Daily PRN Daisy Villegas MD      pravastatin  40 mg Oral Daily With Adithya Bell MD      verapamil  240 mg Oral HS Daisy Villegas MD            ** Please Note: Fluency Direct voice to text software may have been used in the creation of this document   **

## 2022-08-24 PROCEDURE — 97530 THERAPEUTIC ACTIVITIES: CPT

## 2022-08-24 PROCEDURE — 97535 SELF CARE MNGMENT TRAINING: CPT

## 2022-08-24 PROCEDURE — 97129 THER IVNTJ 1ST 15 MIN: CPT

## 2022-08-24 PROCEDURE — 97112 NEUROMUSCULAR REEDUCATION: CPT

## 2022-08-24 PROCEDURE — 99232 SBSQ HOSP IP/OBS MODERATE 35: CPT | Performed by: INTERNAL MEDICINE

## 2022-08-24 PROCEDURE — 97130 THER IVNTJ EA ADDL 15 MIN: CPT

## 2022-08-24 PROCEDURE — 99232 SBSQ HOSP IP/OBS MODERATE 35: CPT | Performed by: PHYSICAL MEDICINE & REHABILITATION

## 2022-08-24 PROCEDURE — 97110 THERAPEUTIC EXERCISES: CPT

## 2022-08-24 RX ADMIN — NEBIVOLOL 5 MG: 5 TABLET ORAL at 08:23

## 2022-08-24 RX ADMIN — MENTHOL, METHYL SALICYLATE: 10; 15 CREAM TOPICAL at 17:53

## 2022-08-24 RX ADMIN — PRAVASTATIN SODIUM 40 MG: 40 TABLET ORAL at 17:52

## 2022-08-24 RX ADMIN — DABIGATRAN ETEXILATE MESYLATE 150 MG: 150 CAPSULE ORAL at 08:24

## 2022-08-24 RX ADMIN — ACETAMINOPHEN 650 MG: 325 TABLET ORAL at 20:46

## 2022-08-24 RX ADMIN — MAGNESIUM OXIDE TAB 400 MG (241.3 MG ELEMENTAL MG) 400 MG: 400 (241.3 MG) TAB at 08:23

## 2022-08-24 RX ADMIN — BACLOFEN 5 MG: 10 TABLET ORAL at 08:23

## 2022-08-24 RX ADMIN — MENTHOL, METHYL SALICYLATE 1 APPLICATION: 10; 15 CREAM TOPICAL at 08:23

## 2022-08-24 RX ADMIN — LOSARTAN POTASSIUM 100 MG: 50 TABLET, FILM COATED ORAL at 20:42

## 2022-08-24 RX ADMIN — LOSARTAN POTASSIUM 100 MG: 50 TABLET, FILM COATED ORAL at 08:23

## 2022-08-24 RX ADMIN — VERAPAMIL HYDROCHLORIDE 240 MG: 240 TABLET ORAL at 21:23

## 2022-08-24 RX ADMIN — BACLOFEN 5 MG: 10 TABLET ORAL at 17:52

## 2022-08-24 RX ADMIN — DABIGATRAN ETEXILATE MESYLATE 150 MG: 150 CAPSULE ORAL at 20:42

## 2022-08-24 NOTE — PLAN OF CARE
Problem: MOBILITY - ADULT  Goal: Maintain or return to baseline ADL function  Description: INTERVENTIONS:  -  Assess patient's ability to carry out ADLs; assess patient's baseline for ADL function and identify physical deficits which impact ability to perform ADLs (bathing, care of mouth/teeth, toileting, grooming, dressing, etc )  - Assess/evaluate cause of self-care deficits   - Assess range of motion  - Assess patient's mobility; develop plan if impaired  - Assess patient's need for assistive devices and provide as appropriate  - Encourage maximum independence but intervene and supervise when necessary  - Involve family in performance of ADLs  - Assess for home care needs following discharge   - Consider OT consult to assist with ADL evaluation and planning for discharge  - Provide patient education as appropriate  Outcome: Progressing  Goal: Maintains/Returns to pre admission functional level  Description: INTERVENTIONS:  - Perform BMAT or MOVE assessment daily    - Set and communicate daily mobility goal to care team and patient/family/caregiver  - Collaborate with rehabilitation services on mobility goals if consulted  - Perform Range of Motion 3 times a day  - Reposition patient every 2 hours    - Dangle patient 3 times a day  - Stand patient 3 times a day  - Ambulate patient 3 times a day  - Out of bed to chair 3 times a day   - Out of bed for meals 3 times a day  - Out of bed for toileting  - Record patient progress and toleration of activity level   Outcome: Progressing     Problem: Prexisting or High Potential for Compromised Skin Integrity  Goal: Skin integrity is maintained or improved  Description: INTERVENTIONS:  - Identify patients at risk for skin breakdown  - Assess and monitor skin integrity  - Assess and monitor nutrition and hydration status  - Monitor labs   - Assess for incontinence   - Turn and reposition patient  - Assist with mobility/ambulation  - Relieve pressure over bony prominences  - Avoid friction and shearing  - Provide appropriate hygiene as needed including keeping skin clean and dry  - Evaluate need for skin moisturizer/barrier cream  - Collaborate with interdisciplinary team   - Patient/family teaching  - Consider wound care consult   Outcome: Progressing     Problem: Potential for Falls  Goal: Patient will remain free of falls  Description: INTERVENTIONS:  - Educate patient/family on patient safety including physical limitations  - Instruct patient to call for assistance with activity   - Consult OT/PT to assist with strengthening/mobility   - Keep Call bell within reach  - Keep bed low and locked with side rails adjusted as appropriate  - Keep care items and personal belongings within reach  - Initiate and maintain comfort rounds  - Make Fall Risk Sign visible to staff  - Offer Toileting every 2-4 Hours, in advance of need  - Initiate/Maintain bed/chair alarm  - Obtain necessary fall risk management equipment: nonskid footware   - Apply yellow socks and bracelet for high fall risk patients  - Consider moving patient to room near nurses station  Outcome: Progressing     Problem: PAIN - ADULT  Goal: Verbalizes/displays adequate comfort level or baseline comfort level  Description: Interventions:  - Encourage patient to monitor pain and request assistance  - Assess pain using appropriate pain scale  - Administer analgesics based on type and severity of pain and evaluate response  - Implement non-pharmacological measures as appropriate and evaluate response  - Consider cultural and social influences on pain and pain management  - Notify physician/advanced practitioner if interventions unsuccessful or patient reports new pain  Outcome: Progressing     Problem: INFECTION - ADULT  Goal: Absence or prevention of progression during hospitalization  Description: INTERVENTIONS:  - Assess and monitor for signs and symptoms of infection  - Monitor lab/diagnostic results  - Monitor all insertion sites, i e  indwelling lines, tubes, and drains  - Monitor endotracheal if appropriate and nasal secretions for changes in amount and color  - Bono appropriate cooling/warming therapies per order  - Administer medications as ordered  - Instruct and encourage patient and family to use good hand hygiene technique  - Identify and instruct in appropriate isolation precautions for identified infection/condition  Outcome: Progressing     Problem: SAFETY ADULT  Goal: Patient will remain free of falls  Description: INTERVENTIONS:  - Educate patient/family on patient safety including physical limitations  - Instruct patient to call for assistance with activity   - Consult OT/PT to assist with strengthening/mobility   - Keep Call bell within reach  - Keep bed low and locked with side rails adjusted as appropriate  - Keep care items and personal belongings within reach  - Initiate and maintain comfort rounds  - Make Fall Risk Sign visible to staff  - Offer Toileting every 2-4 Hours, in advance of need  - Initiate/Maintain bed/chair alarm  - Obtain necessary fall risk management equipment: nonskid foot ware  - Apply yellow socks and bracelet for high fall risk patients  - Consider moving patient to room near nurses station  Outcome: Progressing  Goal: Maintain or return to baseline ADL function  Description: INTERVENTIONS:  -  Assess patient's ability to carry out ADLs; assess patient's baseline for ADL function and identify physical deficits which impact ability to perform ADLs (bathing, care of mouth/teeth, toileting, grooming, dressing, etc )  - Assess/evaluate cause of self-care deficits   - Assess range of motion  - Assess patient's mobility; develop plan if impaired  - Assess patient's need for assistive devices and provide as appropriate  - Encourage maximum independence but intervene and supervise when necessary  - Involve family in performance of ADLs  - Assess for home care needs following discharge   - Consider OT consult to assist with ADL evaluation and planning for discharge  - Provide patient education as appropriate  Outcome: Progressing  Goal: Maintains/Returns to pre admission functional level  Description: INTERVENTIONS:  - Perform BMAT or MOVE assessment daily    - Set and communicate daily mobility goal to care team and patient/family/caregiver  - Collaborate with rehabilitation services on mobility goals if consulted  - Perform Range of Motion 3 times a day  - Reposition patient every 2 hours    - Dangle patient 3 times a day  - Stand patient 3 times a day  - Ambulate patient 3 times a day  - Out of bed to chair 3 times a day   - Out of bed for meals 3 times a day  - Out of bed for toileting  - Record patient progress and toleration of activity level   Outcome: Progressing     Problem: DISCHARGE PLANNING  Goal: Discharge to home or other facility with appropriate resources  Description: INTERVENTIONS:  - Identify barriers to discharge w/patient and caregiver  - Arrange for needed discharge resources and transportation as appropriate  - Identify discharge learning needs (meds, wound care, etc )  - Arrange for interpretive services to assist at discharge as needed  - Refer to Case Management Department for coordinating discharge planning if the patient needs post-hospital services based on physician/advanced practitioner order or complex needs related to functional status, cognitive ability, or social support system  Outcome: Progressing

## 2022-08-24 NOTE — PROGRESS NOTES
Internal Medicine Progress Note  Patient: Homa Whitt  Age/sex: 80 y o  female  Medical Record #: [id number]      ASSESSMENT/PLAN: (Interval History)  Homa Whitt is seen and examined and management for following issues:    Acute embolic right MCA CVA  · Had near occlusion of right MCA  · Felt to be a Xarelto failure and was switched to Pradaxa  · ECHO w/o thrombus  · Continue statin  · On Baclofen for tone left arm     Chronic atrial fibrillation  · Sees Dr Veronica Sanchez as OP  · Rates controlled  · On Pradaxa now, Xarelto stopped     PPM  · VVI  · Is not MRI conditional     Valvular disease  · Current ECHO:  LVEF 65%, mod AR/mild AS, mod TR with severely elevated RV systolic pressure, mild-mod MR, mod LAE/mild NEO  · Euvolemic currently     HTN  · Home:  Verapamil 240mg qhs/Cozaar 868 mg qd/Bystolic 5mg qd/lasix 90HA qd/Aldactone 25mg qd  · Here:  Verapamil 240mg qhs/Cozaar 052EB qd/Bystolic 5mg qd  · OP note states has left RA stenosis but no testing in OP records or Care Everywhere to verify  · No changes again today     HLD  · Home:  Crestor 10mg qd = Neuro rec when discharged to reduce to 5mg qd  · Here:  Pravachol 40mg qd     DARRIUS  · S/p NS 1 liter   · Back to baseline  · Continue to hold Lasix and Aldactone for now     CAD  · Nonobstructive  · Card cath 8/2021 done for borderline abn stress test and found 50% mid RCA/40% mid LAD = no intervention done  · Continue statin  · Was not on ASA as an OP     Hypomagnesemia  · Takes here and at home 400 mg MagOx  · Mg level 8/20 was 2 4         DC planning: Team       The above assessment and plan was reviewed and updated as determined by my evaluation of the patient on 8/24/2022      Labs:   Results from last 7 days   Lab Units 08/22/22  0458   WBC Thousand/uL 7 33   HEMOGLOBIN g/dL 12 0   HEMATOCRIT % 38 6   PLATELETS Thousands/uL 360     Results from last 7 days   Lab Units 08/22/22  0458 08/21/22  0434   SODIUM mmol/L 134* 134*   POTASSIUM mmol/L 4 7 4 8 CHLORIDE mmol/L 108 108   CO2 mmol/L 22 21   BUN mg/dL 31* 33*   CREATININE mg/dL 1 06 1 09   CALCIUM mg/dL 9 3 9 1                   Review of Scheduled Meds:  Current Facility-Administered Medications   Medication Dose Route Frequency Provider Last Rate    acetaminophen  650 mg Oral Q6H PRN Chung Andrade MD      baclofen  5 mg Oral BID Chung Andrade MD      bisacodyl  10 mg Rectal Daily PRN Chung Andrade MD      dabigatran etexilate  150 mg Oral Q12H [address] Chung Andrade MD      losartan  100 mg Oral Daily Chung Andrade MD      magnesium oxide  400 mg Oral Daily Chung Andrade MD      menthol-methyl salicylate   Apply externally BID Lou Mancuso MD      nebivolol  5 mg Oral Daily Chung Andrade MD      polyethylene glycol  17 g Oral Daily PRN Chung Andrade MD      pravastatin  40 mg Oral Daily With Jeffrey Bruno MD      verapamil  240 mg Oral HS Chung Andrade MD         Subjective/ HPI: Patient seen and examined  Patients overnight issues or events were reviewed with nursing or staff during rounds or morning huddle session  New or overnight issues include the following:     No new or overnight issues  Offers no complaints    ROS:   A 10 point ROS was performed; negative except as noted above         Imaging:     No orders to display       *Labs /Radiology studies reviewed  *Medications reviewed and reconciled as needed  *Please refer to order section for additional ordered labs studies  *Case discussed with primary attending during morning huddle case rounds    Physical Examination:  Vitals:   Vitals:    08/23/22 0620 08/23/22 1408 08/23/22 2038 08/24/22 0527   BP: 115/53 152/70 125/58 (!) 115/48   BP Location: Right arm Right arm Right arm Right arm   Pulse: 60 62 67 61   Resp: 18 16 18 17   Temp: 97 5 °F (36 4 °C) 98 4 °F (36 9 °C) 97 6 °F (36 4 °C) 97 7 °F (36 5 °C)   TempSrc: Oral Oral Oral Oral   SpO2: 97% 95% 99% 98%   Weight:    64 kg (141 lb 1 5 oz)   Height:           General Appearance: no distress, conversive  HEENT: PERRLA, conjuctiva normal; oropharynx clear; mucous membranes moist   Neck:  Supple, normal ROM  Lungs: CTA, normal respiratory effort, no retractions, expiratory effort normal  CV: regular rate and rhythm; no rubs/murmurs/gallops, PMI normal   ABD: soft; ND/NT; +BS  EXT: no edema  Skin: normal turgor, normal texture, no rashes  Psych: affect normal, mood normal  Neuro: AAO      The above physical exam was reviewed and updated as determined by my evaluation of the patient on 8/24/2022  Invasive Devices  Report    Drain  Duration           External Urinary Catheter <1 day                   VTE Pharmacologic Prophylaxis: Pradaxa  Code Status: Level 1 - Full Code  Current Length of Stay: 5 day(s)      Total time spent:  30 minutes with more than 50% spent counseling/coordinating care  Counseling includes discussion with patient re: progress  and discussion with patient of his/her current medical state/information  Coordination of patient's care was performed in conjunction with primary service  Time invested included review of patient's labs, vitals, and management of their comorbidities with continued monitoring  In addition, this patient was discussed with medical team including physician and advanced extenders  The care of the patient was extensively discussed and appropriate treatment plan was formulated unique for this patient  ** Please Note:  voice to text software may have been used in the creation of this document   Although proof errors in transcription or interpretation are a potential of such software**

## 2022-08-24 NOTE — PROGRESS NOTES
08/24/22 0700   Pain Assessment   Pain Assessment Tool 0-10   Pain Score 3   Pain Location/Orientation Orientation: Left; Location: Neck   Restrictions/Precautions   Precautions Bed/chair alarms;Cognitive; Fall Risk;Pain;Supervision on toilet/commode   Weight Bearing Restrictions No   ROM Restrictions No   Braces or Orthoses Other (Comment)  (multipodus LLE; RHS LUE)   Lifestyle   Autonomy "That boot   it hurts" referring to multipodus boot   Eating   Type of Assistance Needed Set-up / clean-up   Physical Assistance Level No physical assistance   Eating CARE Score 5   Oral Hygiene   Type of Assistance Needed Supervision   Physical Assistance Level No physical assistance   Comment seated in wc at sink; pt able to manipulate tooth paste/cap and tooth brush with inc time   Oral Hygiene CARE Score 4   Shower/Bathe Self   Type of Assistance Needed Physical assistance   Physical Assistance Level 76% or more   Comment pt completes full sponge bathing routine seated in w/c at sink, progress from previous ADLs which were mostly bed level  pt bathes UB with inc time and overall set up demo'ing ability to functionally use L hand during bathing  pt able to bathe upper legs, R lower leg with assist for R foot, L lower leg/foot; mod/max assist when in stance for balance/posterior lean while pt used R UE to bathe ervin/rear  Shower/Bathe Self CARE Score 2   Upper Body Dressing   Type of Assistance Needed Physical assistance   Physical Assistance Level 26%-50%   Comment pt able to thread UEs, head into shirt  requires assist to pull down over L shoulder and trunk   Upper Body Dressing CARE Score 3   Lower Body Dressing   Type of Assistance Needed Physical assistance   Physical Assistance Level 76% or more   Comment pt attempted to thread pants over R LE however unable to successfully complete by performing func reaching  assist to thread LLE into pants   stands with mod/max assist for balance/posterior lean while pt completes CM on R side, requires assist on L side  Lower Body Dressing CARE Score 2   Putting On/Taking Off Footwear   Type of Assistance Needed Physical assistance   Physical Assistance Level Total assistance   Comment pt able to remove sock from R heel, requires assist to fully doff  assist to doff L sock  total assist to don socks and shoes with AFO   Putting On/Taking Off Footwear CARE Score 1   Sit to Stand   Type of Assistance Needed Physical assistance   Physical Assistance Level 76% or more   Comment max assist with "bear hug" tech vs pushing from R arm rest as this puts pt into extensor tone requiring Ax2 for safety  Sit to Stand CARE Score 2   Bed-Chair Transfer   Type of Assistance Needed Physical assistance   Physical Assistance Level Total assistance   Comment Max A x1 for sit pivot to R side; Max Ax1 and Min/mod Ax1 for SPT to L side with assist to advance LLE  Chair/Bed-to-Chair Transfer CARE Score 1   Toileting Hygiene   Type of Assistance Needed Physical assistance   Physical Assistance Level Total assistance   Comment Ax2 for toileting   Toileting Hygiene CARE Score 1   Toilet Transfer   Type of Assistance Needed Physical assistance   Physical Assistance Level Total assistance   Comment Max A x1 for sit pivot to R side; Max Ax1 and Min/mod Ax1 for SPT to L side with assist to advance LLE  Toilet Transfer CARE Score 1   Coordination   Fine Motor pt engages in L UE NMR using graded clothes pins focusing on lateral and tip to tip pinching as well as active elbow extension with difficulty manipulating clothes pins solely in L hand requiring assist to "set up" clothes pin using R hand  Cognition   Overall Cognitive Status Impaired   Arousal/Participation Alert; Cooperative   Attention Attends with cues to redirect   Orientation Level Oriented X4   Memory Decreased short term memory   Following Commands Follows one step commands with increased time or repetition   Activity Tolerance   Activity Tolerance Patient tolerated treatment well   Assessment   Treatment Assessment pt engages in 90 minute skilled OT session focusing on sponge bathing routine, func transfers, standing horacio/bal and L UE NMR  see above for full func details  pt reports not sleeping well last night, discomfort in L neck/shoulder  despite no real change in CARE score, pt demo's progress with ADL this date progressing from bed level to w/c level seated at sink  pt continues to require ext assist due to L sided tone, weakness, incoordination and impaired ROM  pt remains very motivated to use L UE in func tasks mostly as pt is L hand dominant  recommend continued skilled care to focus on ADL retraining, func transfers, standing horacio/bal, L UE NMR, func cog, in order to decrease burden of care at d/c  Prognosis Fair   Problem List Decreased strength;Decreased range of motion;Decreased endurance; Impaired balance;Decreased mobility; Decreased cognition;Decreased coordination; Impaired judgement;Decreased safety awareness; Impaired tone;Pain   Barriers to Discharge Inaccessible home environment;Decreased caregiver support   Plan   Treatment/Interventions ADL retraining;Functional transfer training; Therapeutic exercise; Endurance training;Cognitive reorientation;Patient/family training;Equipment eval/education; Compensatory technique education   OT Therapy Minutes   OT Time In 0700   OT Time Out 0830   OT Total Time (minutes) 90   OT Mode of treatment - Individual (minutes) 90   OT Mode of treatment - Concurrent (minutes) 0   OT Mode of treatment - Group (minutes) 0   OT Mode of treatment - Co-treat (minutes) 0   OT Mode of Treatment - Total time(minutes) 90 minutes   OT Cumulative Minutes 300   Therapy Time missed   Time missed?  No

## 2022-08-24 NOTE — PROGRESS NOTES
08/24/22 1000   Pain Assessment   Pain Assessment Tool 0-10   Pain Score No Pain   Effect of Pain on Daily Activities spoke with Dr Ron Kuo who suggested use of MHP to L upper trap end of session 2* tightness  skin cleansed to remove bengay and MHP applied for 10 mins  No change in skin integrity from pre to post MHP  Restrictions/Precautions   Precautions Bed/chair alarms;Cognitive; Fall Risk;Supervision on toilet/commode   Braces or Orthoses AFO  (L AFO donned beginning of session, doffed end of session, no skin changes  Use of black TB around LLE to assist with DF and hip flex)   Cognition   Overall Cognitive Status Impaired   Arousal/Participation Alert; Cooperative   Attention Attends with cues to redirect   Subjective   Subjective Pt reports she did not sleep well last night but agreeable to therapy  Denies pain  Sit to Stand   Type of Assistance Needed Physical assistance   Physical Assistance Level 76% or more   Comment when pulling up pt holding onto therapist hand pt does better but retropulsive if pushing with RUE to stand requiring maxAx1   Sit to Stand CARE Score 2   Bed-Chair Transfer   Type of Assistance Needed Physical assistance   Physical Assistance Level 76% or more   Comment maxAx1 sit pivot transfer to L and R side   Chair/Bed-to-Chair Transfer CARE Score 2   Car Transfer   Reason if not Attempted Environmental limitations   Car Transfer CARE Score 10   Walk 10 Feet   Type of Assistance Needed Physical assistance   Physical Assistance Level Total assistance   Comment min-modAx2 with 3rd person CF, poor UE weight bearing through RUE, use of gait belt for safety; needs assistance 40% of time for LLE advancement   Pt fatigued at 45' max this session   Walk 10 Feet CARE Score 1   Walk 50 Feet with Two Turns   Reason if not Attempted Safety concerns   Walk 50 Feet with Two Turns CARE Score 88   Walk 150 Feet   Reason if not Attempted Safety concerns   Walk 150 Feet CARE Score 88   Walking 10 Feet on Uneven Surfaces   Reason if not Attempted Safety concerns   Walking 10 Feet on Uneven Surfaces CARE Score 88   Ambulation   Does the patient walk? 2  Yes   Primary Mode of Locomotion Prior to Admission Walk   Distance Walked (feet) 30 ft  (x2, 45'x2)   Assist Device Hand Hold  (started at R HR with gait belt and 2nd person CF; progressed to b/l HHA w/ 3rd person CF)   Gait Pattern Ataxic; Inconsistant Linda; Slow Linda;Decreased foot clearance;L foot drag; Forward Flexion;L hemiparesis; Narrow LUCY; Poor UE WB;Decreased L stance; Improper weight shift   Limitations Noted In Balance; Coordination; Endurance; Heel Strike;Posture; Safety; Sequencing;Speed;Strength;Swing   Provided Assistance with: Direction;Balance;Limb Advancement;Trunk Support;Weight Shift   Walk Assist Level Moderate Assist;Chair Follow   Findings elected to start amb trials at R HR this session with use of LAFO black TB to assist with DF and hip flex of LLE and gait belt  Pt able to walk length of HR at modAx1 needing assist on 1 occassion only for L foot advancement  Performed b/l HHA, assisted approx 40% of time for LLE advancement but pt showing much improvement with abiity to advance limb with assist for weight shifting requiring min-modAx2 to complete b/l HHA  Posture also improved as less R lateral lean but with fatigue does present  Wheel 50 Feet with Two Turns   Type of Assistance Needed Physical assistance   Physical Assistance Level 51%-75%   Wheel 50 Feet with Two Turns CARE Score 2   Wheel 150 Feet   Type of Assistance Needed Physical assistance   Physical Assistance Level 76% or more   Wheel 150 Feet CARE Score 2   Wheelchair mobility   Does the patient use a wheelchair? 1  Yes   Type of Wheelchair Used 1   Manual   Method Right upper extremity;Right lower extremity   Assistance Provided For Locking Brakes;Obstacles;Remove Leg Rest;Replace Leg Rest;Remove armrests;Replace armrests   Distance Level Surface (feet) 50 ft   Findings pt with difficulty sequencing propelling using RLE to steer; did provide pt with visual demo as well to help with understanding  On straight path pt requiring min-modAx1 but with turns to R especially pt maxAx1   Curb or Single Stair   Reason if not Attempted Safety concerns   1 Step (Curb) CARE Score 88   4 Steps   Reason if not Attempted Activity not applicable   4 Steps CARE Score 9   12 Steps   Reason if not Attempted Activity not applicable   12 Steps CARE Score 9   Stairs   Type Stairs   # of Steps 4  (4"  steps)   Weight Bearing Precautions Fall Risk   Assist Devices Bilateral Rail   Findings performed on 4"  steps with b/l UE support on HRs, retro descent and maxAx1 for LLE ascending/descending, min-modax1 from second person for balance and stability   Picking Up Object   Reason if not Attempted Safety concerns   Picking Up Object CARE Score 88   Therapeutic Interventions   Flexibility R hamstring/gastroc stretch 3x30 sec hold, LLE D1 AND D2 PNF patterns performed to improve ROM and reduce rigidity 3'   Neuromuscular Re- step taps on 4" steps with b/l UE assist on HR; performed 2x15 with RLE, no L knee buckling or hyperextension noted, pt mainly CGA to complete with RLE; with LLE pt requiring Tan/CGA for weight shift and balance, maxAx1 from 2nd persosn to get LLE up/down step with pt attempting to assist as mm contraction noted with tapping up to step, cues to clear step   Equipment Use   NuStep lvl 1 5' all extremities with assist to prevent LLE from IR, then performed 2 mins with LUE and LLE only, moderate assistance provided from therapist and tactile cues for quad contraction   Assessment   Treatment Assessment 90 min skilled PT session focusing on NPP interventions for stroke recovery with focus on specificity, repetition and intensity, transfer trng, improving pt's activity tolerance, and w/c propulsion   Pt continues with retropulsion and extensor posture when coming to stand if pushing from chair, does best holding hand of therapist or holding onto railing when standing and pulling to stand  Will cont to work on anterior weight shift with pt to improve STS transfers  Initially noted rigidity in LLE start of session, with PNF movements performed to LLE in sitting ROM improved  Able to trial amb with b/l HHA this session and while pt does fatigue she demo'd improvements in ability to advance LLE on her own this session but still does require assistance at times for this and compensations noted to advance LLE including circumduction due to hip flexor weakness  When pt does catch LLE on ground during ambulation however she is able to correct with next step, still requires A for weight shifting  Pt still requires maxAx1 for LLE management on steps however noted activation of LLE mm during movement  At this time pt cont to function below her baseline and would benefit from further skilled PT intervention  POC to cont to focus on gait trng, transfer trng, stair trng when able, w/c propulsion, NPP intervention for stroke recovery, mat program for LE strengthening, improving pt's STS all to reduce caregiver burden and risk for falls upon d/c  Family/Caregiver Present son present start of session but left   Problem List Decreased strength;Decreased range of motion;Decreased endurance; Impaired balance;Decreased mobility; Decreased cognition;Decreased coordination; Impaired judgement;Decreased safety awareness; Impaired tone;Pain   Barriers to Discharge Inaccessible home environment;Decreased caregiver support   PT Barriers   Functional Limitation Car transfers; Ramp negotiation;Stair negotiation;Transfers;Standing;Walking; Wheelchair management   Plan   Treatment/Interventions Functional transfer training;LE strengthening/ROM; Elevations; Therapeutic exercise; Endurance training;Bed mobility;Gait training;Cognitive reorientation;Equipment eval/education   Progress Progressing toward goals   PT Therapy Minutes   PT Time In 1000   PT Time Out 1130   PT Total Time (minutes) 90   PT Mode of treatment - Individual (minutes) 90   PT Mode of treatment - Concurrent (minutes) 0   PT Mode of treatment - Group (minutes) 0   PT Mode of treatment - Co-treat (minutes) 0   PT Mode of Treatment - Total time(minutes) 90 minutes   PT Cumulative Minutes 450

## 2022-08-24 NOTE — PLAN OF CARE
Reviewed   Problem: MOBILITY - ADULT  Goal: Maintain or return to baseline ADL function  Description: INTERVENTIONS:  -  Assess patient's ability to carry out ADLs; assess patient's baseline for ADL function and identify physical deficits which impact ability to perform ADLs (bathing, care of mouth/teeth, toileting, grooming, dressing, etc )  - Assess/evaluate cause of self-care deficits   - Assess range of motion  - Assess patient's mobility; develop plan if impaired  - Assess patient's need for assistive devices and provide as appropriate  - Encourage maximum independence but intervene and supervise when necessary  - Involve family in performance of ADLs  - Assess for home care needs following discharge   - Consider OT consult to assist with ADL evaluation and planning for discharge  - Provide patient education as appropriate  Outcome: Progressing  Goal: Maintains/Returns to pre admission functional level  Description: INTERVENTIONS:  - Set and communicate daily mobility goal to care team and patient/family/caregiver  - Collaborate with rehabilitation services on mobility goals if consulted  - Perform Range of Motion 3 times a day  - Reposition patient every 2 hours    - Dangle patient 3 times a day  - Stand patient 3 times a day  - Ambulate patient 3 times a day  - Out of bed to chair 3 times a day   - Out of bed for meals 3 times a day  - Out of bed for toileting  - Record patient progress and toleration of activity level   Outcome: Progressing     Problem: Prexisting or High Potential for Compromised Skin Integrity  Goal: Skin integrity is maintained or improved  Description: INTERVENTIONS:  - Identify patients at risk for skin breakdown  - Assess and monitor skin integrity  - Assess and monitor nutrition and hydration status  - Monitor labs   - Assess for incontinence   - Turn and reposition patient  - Assist with mobility/ambulation  - Relieve pressure over bony prominences  - Avoid friction and shearing  - Provide appropriate hygiene as needed including keeping skin clean and dry  - Evaluate need for skin moisturizer/barrier cream  - Collaborate with interdisciplinary team   - Patient/family teaching  - Consider wound care consult   Outcome: Progressing     Problem: Potential for Falls  Goal: Patient will remain free of falls  Description: INTERVENTIONS:  - Educate patient/family on patient safety including physical limitations  - Instruct patient to call for assistance with activity   - Consult OT/PT to assist with strengthening/mobility   - Keep Call bell within reach  - Keep bed low and locked with side rails adjusted as appropriate  - Keep care items and personal belongings within reach  - Initiate and maintain comfort rounds  - Make Fall Risk Sign visible to staff  - Offer Toileting every 2-4 Hours, in advance of need  - Initiate/Maintain bed/chair alarm  - Obtain necessary fall risk management equipment: nonskid footwear  - Apply yellow socks and bracelet for high fall risk patients  - Consider moving patient to room near nurses station  Outcome: Progressing     Problem: PAIN - ADULT  Goal: Verbalizes/displays adequate comfort level or baseline comfort level  Description: Interventions:  - Encourage patient to monitor pain and request assistance  - Assess pain using appropriate pain scale  - Administer analgesics based on type and severity of pain and evaluate response  - Implement non-pharmacological measures as appropriate and evaluate response  - Consider cultural and social influences on pain and pain management  - Notify physician/advanced practitioner if interventions unsuccessful or patient reports new pain  Outcome: Progressing     Problem: INFECTION - ADULT  Goal: Absence or prevention of progression during hospitalization  Description: INTERVENTIONS:  - Assess and monitor for signs and symptoms of infection  - Monitor lab/diagnostic results  - Monitor all insertion sites, i e  indwelling lines, tubes, and drains  - Monitor endotracheal if appropriate and nasal secretions for changes in amount and color  - [place] appropriate cooling/warming therapies per order  - Administer medications as ordered  - Instruct and encourage patient and family to use good hand hygiene technique  - Identify and instruct in appropriate isolation precautions for identified infection/condition  Outcome: Progressing     Problem: DISCHARGE PLANNING  Goal: Discharge to home or other facility with appropriate resources  Description: INTERVENTIONS:  - Identify barriers to discharge w/patient and caregiver  - Arrange for needed discharge resources and transportation as appropriate  - Identify discharge learning needs (meds, wound care, etc )  - Arrange for interpretive services to assist at discharge as needed  - Refer to Case Management Department for coordinating discharge planning if the patient needs post-hospital services based on physician/advanced practitioner order or complex needs related to functional status, cognitive ability, or social support system  Outcome: Progressing

## 2022-08-24 NOTE — PROGRESS NOTES
08/24/22 1430   Pain Assessment   Pain Assessment Tool 0-10   Pain Score No Pain   Restrictions/Precautions   Precautions Bed/chair alarms;Cognitive; Fall Risk;Supervision on toilet/commode   Comprehension   Comprehension (FIM) 5 - Understands basic directions and conversation   Expression   Expression (FIM) 5 - Needs help/cues only RARELY (< 10% of the time)   Social Interaction   Social Interaction (FIM) 6 - Interacts appropriately with others BUT requires extra  time   Problem Solving   Problem solving (FIM) 4 - Solves basic problems 75-89% of time   Memory   Memory (FIM) 4 - Recognizes/recalls/performs 75-89%   Speech/Language/Cognition Assessmetn   Treatment Assessment Pt was awake, alert and engaged for session today  As current SLP is new to pt's care, engaged in brief rapport building w/ pt  It was noted that pt is oriented to situation, full date, place/city  In addition, pt was able to provide basic LTM biographical information in conversation held in [place]  Pt did appear to have good insight to overall deficits since sustaining stroke and physical limitations (L sided weakness) and reporting being L handed  Of note, this did not impact participation in session as pt used R hand for circling words during structured task provided  When engaged in structured tasks, the written informaiton presented was in [place] as this is pt's primary language, to where pt did appear to complete tasks w/o increased assistance for more increased complexity given category word lists  SLP providing pt w/ category inclusion list where pt was to determine from a list of 9 words which belong given the category  On the initial attempt, where the category groups were concrete in nature, pt was 53/54 accurate in determining words which belonged give the category name (ie: materials, transportation, etc)   When increasing the difficulty given the categories for the same inclusion task, pt was 52/54 accurate, needing some verbal cues for certain words which did not translate in accordance to pt's dialect in [place]  SLP providing education to pt at end of session that session will target these types of cognitive tasks, but will also f/u pt's ability to complete financial and medication management skills, as per discussion held that pt does complete these tasks prior to admission w/o assistance from family  At this time, pt will continue to benefit from ongoing skilled SLP services targeting functional independence given cognitive linguistic skills to decrease caregiver burden over time  SLP Therapy Minutes   SLP Time In 1430   SLP Time Out 1500   SLP Total Time (minutes) 30   SLP Mode of treatment - Individual (minutes) 30   SLP Mode of treatment - Concurrent (minutes) 0   SLP Mode of treatment - Group (minutes) 0   SLP Mode of treatment - Co-treat (minutes) 0   SLP Mode of Treatment - Total time(minutes) 30 minutes   SLP Cumulative Minutes 150   Therapy Time missed   Time missed?  No

## 2022-08-24 NOTE — PROGRESS NOTES
Physical Medicine and Rehabilitation Progress Note  Sukumar Burgos 80 y o  female MRN: [id number]  Unit/Bed#: -66 Encounter: [id number]    HPI: 26-year-old female with a past medical history of AFib recently on Xarelto, sick sinus syndrome status status post pacemaker placement that is not MRI compatible, hypertension, hyperlipidemia, valvular disease, coronary artery disease developed dense left-sided weakness, dysarthria and presented to the hospital on 08/12/2022  She had systolic blood pressure of 200 on arrival   CT head showed focal area of gliosis within the high right post central gyrus and scattered chronic microvascular ischemic changes with more focal lucency along the anterior limb of the right internal capsule  CTA of head and neck showed near occlusive thrombus at the right MCA bifurcation, mild beating of the mid to distal ICA suspicious mild fibromuscular dysplasia and mild atherosclerotic disease at the bilateral distal carotid arteries  She was not a candidate for MRI due to pacemaker  CT cerebral perfusion study showed 8 mL of mismatch  She was not a candidate for tPA due to bleeding risk from being on Xarelto  She was not a thrombectomy candidate for neurointerventional   Patient was recommended to transition to Pradaxa  Patient was evaluated by skilled therapies and was found to have significant decline in ADLs and ambulation and appears appropriate for admission to [facility]  Chief Complaint: No new issues  Interval History/Subjective:  No acute events overnight  L shoulder/neck discomfort improves with the bengay  Using heat in therapies  Making progress and ambulating with assistance in therapies  Initiation on the L side has improved, as has her active rom in her shoulder/elbow  No new CP, SOB, fevers, chills, N/V, abdominal pain  Last BM was 8/24       ROS:  A 10 point review of systems was negative except for what is noted in the HPI     Today's Changes:  1  Continue current plan of care    Total visit time: 25 minutes, with more than 50% spent counseling/coordinating care  Counseling includes discussion with patient re: progress in therapies, functional issues observed by therapy staff, and discussion with patient regarding their current medical state and wellbeing  Coordination of patient's care was performed in conjunction with Internal Medicine service to monitor patient's labs, vitals, and management of their comorbidities  Assessment/Plan:    * Arterial ischemic stroke, MCA (middle cerebral artery), right, acute (HCC)  Assessment & Plan  - R MCA CVA   - CTA head/neck - near occlusive thrombus at the R MCA bifurcation, mild beading of the mid to distal ICAs suspicious for mild fibromuscular dysplasia, and mild atherosclerotic disease at bilateral distal carotid arteries  - no MRI because pacemaker not compatible    - Residual impairments on admission to [facility]: L spastic hemiparesis, bowel/bladder dysfunction, possible cog impairments (assess for other impairments during ARC course)     Secondary stroke prevention  - repeat CT angio head and neck in 3 months to re-evaluate thrombus is a outpatient  - Antithrombotic: Pradaxa (switched from Xarelto for possible failure)   - Statin  - Optimal management of blood pressure and diabetes  - Patient at increased risk for stroke particularly early in post-stroke period - monitor neuro exam closely with low threshold to repeat imaging  -  patient and if applicable caregiver on optimal stroke management  - Follow-up with neurology and PCP after d/c   - Recommend acute comprehensive interdisciplinary inpatient rehabilitation to include intensive skilled therapies (PT, OT, ST) as outlined with oversight and management by rehabilitation physician as well as inpatient rehab level nursing, case management and weekly interdisciplinary team meetings     - patient does have fair left hand and wrist strength; can use resting hand splint for a few hours during the day and overnight but to not overuse and encourage use hand and wrist  - multi Podus boot on left foot patient can breaks to potentially decrease risk of contracture/skin breakdown   - patient has spastic left upper extremity making subluxation less likely although continue subluxation precautions      At risk for coping difficulty  Assessment & Plan  Supportive counseling  Psychology consult while in [facility]  Discharged on magnesium daily   Continue for now  Monitor level  Neck pain  Assessment & Plan  With taut muscle bands in left cervical paraspinal and trapezius areas  Gentle stretching  Baclofen 5 mg b i d  > titrating up as appropriate    PRN APAP  Consider K-pad or other topicals    At risk for altered urinary elimination  Assessment & Plan  Patient has been using ex-cath suction device - Pure-wick PTA to Arc   - She would like to continue at least at night until mobility improves given risk of accidents and impaired hygiene  - She feels like she does have some control  - Continue just at night for now until mobility improves  - monitor for retention, incontinence, signs/symptoms of UTI  - ensure optimal bowel management   - recommend toileting program Q2-4 H during day and Q4-6H overnight   - bladder scan Q4H, monitor output and PVRs, straight cath >450 or if uncomfortable 250-449      CAD (coronary artery disease)  Assessment & Plan  IM consulted and with overall management at their discretion during ARC course  Antithrombotic: Pradaxa  Statin  Optimal blood pressure   Outpatient Cardiology follow-up    Per IM  · "Nonobstructive  · Card cath 8/2021 done for borderline abn stress test and found 50% mid RCA/40% mid LAD = no intervention done  · Continue statin  · Was not on ASA as an OP"       Valvular heart disease  Assessment & Plan  Per IM  · "Current ECHO:  LVEF 65%, mod AR/mild AS, mod TR with severely elevated RV systolic pressure, mild-mod MR, mod LAE/mild NEO  · Euvolemic currently but will watch since CTA on admission showed pulm edema and b/l pleural effusions"  -diuretics at their discretion   -Outpatient cardiology follow-up    Muscle spasticity  Assessment & Plan  Left upper extremity significantly (also some L neck tightness/spasms)  Modified Sada scale 3 in shoulder, and 2 in her elbow  Baclofen 5mg BID trial at low dose  She is a bit tired, monitor on this dosage given kidney function  Gentle range of motion with therapy  Patient does have movement of the hand and wrist and do want to encourage that - can still consider resting hand splint intermittently  Consider  Botox  Skilled PT OT  Optimal skin hygiene    DARRIUS (acute kidney injury) ([facility] [address] )  Assessment & Plan  8/22 Crea back to baseline 1 06  Medicine consulted  Hold Lasix and Aldactone and repeat BMP in the morning  Resume diuretics at discretion of medicine  Monitor labs and output    At risk for venous thromboembolism (VTE)  Assessment & Plan  SCDs, ambulation, and Pradaxa       Pacemaker  Assessment & Plan  Not MRI compatible    Chronic [facility] fibrillation ([facility] )  Assessment & Plan  IM consulted and with overall management at their discretion during ARC course  Rate control: Verapamil, Nebivolol   Antithrombotic: Pradaxa       Stage 3 chronic kidney disease [facility])  Assessment & Plan  Lab Results   Component Value Date    EGFR 49 08/22/2022    EGFR 47 08/21/2022    EGFR 35 08/20/2022    CREATININE 1 06 08/22/2022    CREATININE 1 09 08/21/2022    CREATININE 1 39 (H) 08/20/2022     Given IVF  Crea now back down to ~1 06 near baseline  Avoid nephrotoxic meds  Monitor voiding status  IM consulted       Hyperlipidemia LDL goal <100  Assessment & Plan  Statin    Essential hypertension  Assessment & Plan  Internal medicine consulted and co-management with their service  Monitor vitals with and without activity; monitor for orthostasis  Monitor hemoglobin, electrolytes, kidney function, hydration status   Current meds:  Losartan, nebivolol, Verapamil 240mg daily  - Lasix and Aldactone on hold        Health Maintenance  #Delirium/Sleep: At risk  Environmental interventions  Optimize pain, bowel, bladder, sleep-wake cycle management  #Pain: Tylenol PRN  #Bowel: Last BM 8/24 and continent  Not on regimen - only PRNs  #Bladder: Voiding and mostly continent  Purewick at night to transition once more mobile  #Skin/Pressure Injury Prevention: Turn Q2hr in bed, with weight shifts [id number] in wheelchair  #DVT Prophylaxis: Fully anticoagulated on pradaxa  #GI Prophylaxis: PO diet   #Code Status: Full Code  #FEN: Reg/Thins  #Dispo: Team Conference 8/23: SIAOS 3-4 weeks  Plan to reteam      Objective:    Functional Update:  PT: maxA transfers, mod-maxA bed mobility, totalA x2 ambulation 30', modA wheelchair mobility 150'   OT: Sup eating, maxA grooming, Ax2 bathing, maxA UB dressing, Ax2 LB dressing, Ax2 toileting and toilet transfers  SLP: mild cognitive impairment  Allergies per EMR    Physical Exam:  Temp:  [97 6 °F (36 4 °C)-98 4 °F (36 9 °C)] 97 7 °F (36 5 °C)  HR:  [61-67] 61  Resp:  [16-18] 17  BP: (115-152)/(48-70) 115/48  Oxygen Therapy  SpO2: 98 %    Gen: No acute distress, Well-nourished, well-appearing  HEENT: Moist mucus membranes, Normocephalic/Atraumatic  Cardiovascular: Regular rate, rhythm, S1/S2  Distal pulses palpable  Heme/Extr: No edema  Pulmonary: Non-labored breathing  Lungs CTAB  : No contreras  GI: Soft, non-tender, non-distended  MSK: Able to get L shoulder to about neutral in scaption, and she is able to get almost full range of motion actively with flexion  She remains with an extension lag in her elbow  She ambulates with some difficulty initiation on the L  Difficulty with clearance, advancement, and tendency to lean to the R  Integumentary: Skin is warm, dry  Neuro: AAOx3 Speech is intact   Appropriate to questioning  Psych: Normal mood and affect  Diagnostic Studies: Reviewed, no new imaging      Laboratory:  Reviewed   Results from last 7 days   Lab Units 08/22/22  0458   HEMOGLOBIN g/dL 12 0   HEMATOCRIT % 38 6   WBC Thousand/uL 7 33     Results from last 7 days   Lab Units 08/22/22  0458 08/21/22  0434 08/20/22  0522   BUN mg/dL 31* 33* 42*   POTASSIUM mmol/L 4 7 4 8 4 7   CHLORIDE mmol/L 108 108 104   CREATININE mg/dL 1 06 1 09 1 39*            Patient Active Problem List   Diagnosis    Essential hypertension    Hyperlipidemia LDL goal <100    Stage 3 chronic kidney disease (HCC)    Osteoarthritis of both wrists    Chronic atrial fibrillation (HCC)    Tubulovillous adenoma    Gastroesophageal reflux disease without esophagitis    Pacemaker    B12 deficiency    Allergic rhinitis due to pollen    Cavus deformity of foot    Midline cystocele    Hallux abducto valgus, bilateral    Left renal artery stenosis (HCC)    Osteoarthritis of hip    Osteopenia    Pain due to onychomycosis of toenails of both feet    Left atrial enlargement    Lipoma of right upper extremity    Diverticulosis of colon    Abnormal nuclear stress test    Renal insufficiency    Arterial ischemic stroke, MCA (middle cerebral artery), right, acute (HCC)    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)    Anemia    At risk for venous thromboembolism (VTE)    DARRIUS (acute kidney injury) ([facility] [address] )    Muscle spasticity    Valvular heart disease    CAD (coronary artery disease)    At risk for altered urinary elimination    Neck pain    Healthcare maintenance    At risk for coping difficulty         Medications  Current Facility-Administered Medications   Medication Dose Route Frequency Provider Last Rate    acetaminophen  650 mg Oral Q6H PRN Lucia Donovan MD      baclofen  5 mg Oral BID Lucia Donovan MD      bisacodyl  10 mg Rectal Daily PRN Lucia Donovan MD      dabigatran etexilate  150 mg Oral Q12H [address] Daisy Villegas MD      losartan  100 mg Oral Daily Daisy Villegas MD      magnesium oxide  400 mg Oral Daily Daisy Villegas MD      menthol-methyl salicylate   Apply externally BID Brenda Carcamo MD      nebivolol  5 mg Oral Daily Daisy Villegas MD      polyethylene glycol  17 g Oral Daily PRN Daisy Villegas MD      pravastatin  40 mg Oral Daily With Adithya Bell MD      verapamil  240 mg Oral HS Daisy Villegas MD            ** Please Note: Fluency Direct voice to text software may have been used in the creation of this document   **

## 2022-08-25 ENCOUNTER — APPOINTMENT (OUTPATIENT)
Dept: RADIOLOGY | Facility: HOSPITAL | Age: 82
DRG: 057 | End: 2022-08-25
Payer: MEDICARE

## 2022-08-25 PROBLEM — R07.89 OTHER CHEST PAIN: Status: ACTIVE | Noted: 2017-12-08

## 2022-08-25 LAB
2HR DELTA HS TROPONIN: -1 NG/L
4HR DELTA HS TROPONIN: -1 NG/L
ANION GAP SERPL CALCULATED.3IONS-SCNC: 4 MMOL/L (ref 4–13)
ATRIAL RATE: 60 BPM
BASOPHILS # BLD AUTO: 0.04 THOUSANDS/ΜL (ref 0–0.1)
BASOPHILS NFR BLD AUTO: 1 % (ref 0–1)
BILIRUB UR QL STRIP: NEGATIVE
BUN SERPL-MCNC: 32 MG/DL (ref 5–25)
CALCIUM SERPL-MCNC: 9.3 MG/DL (ref 8.3–10.1)
CARDIAC TROPONIN I PNL SERPL HS: 28 NG/L
CARDIAC TROPONIN I PNL SERPL HS: 28 NG/L
CARDIAC TROPONIN I PNL SERPL HS: 29 NG/L
CHLORIDE SERPL-SCNC: 106 MMOL/L (ref 96–108)
CLARITY UR: CLEAR
CO2 SERPL-SCNC: 24 MMOL/L (ref 21–32)
COLOR UR: NORMAL
CREAT SERPL-MCNC: 1.05 MG/DL (ref 0.6–1.3)
EOSINOPHIL # BLD AUTO: 0.11 THOUSAND/ΜL (ref 0–0.61)
EOSINOPHIL NFR BLD AUTO: 1 % (ref 0–6)
ERYTHROCYTE [DISTWIDTH] IN BLOOD BY AUTOMATED COUNT: 14.2 % (ref 11.6–15.1)
GFR SERPL CREATININE-BSD FRML MDRD: 49 ML/MIN/1.73SQ M
GLUCOSE P FAST SERPL-MCNC: 111 MG/DL (ref 65–99)
GLUCOSE SERPL-MCNC: 111 MG/DL (ref 65–140)
GLUCOSE UR STRIP-MCNC: NEGATIVE MG/DL
HCT VFR BLD AUTO: 37.9 % (ref 34.8–46.1)
HGB BLD-MCNC: 11.6 G/DL (ref 11.5–15.4)
HGB UR QL STRIP.AUTO: NEGATIVE
IMM GRANULOCYTES # BLD AUTO: 0.06 THOUSAND/UL (ref 0–0.2)
IMM GRANULOCYTES NFR BLD AUTO: 1 % (ref 0–2)
KETONES UR STRIP-MCNC: NEGATIVE MG/DL
LEUKOCYTE ESTERASE UR QL STRIP: NEGATIVE
LYMPHOCYTES # BLD AUTO: 1.74 THOUSANDS/ΜL (ref 0.6–4.47)
LYMPHOCYTES NFR BLD AUTO: 21 % (ref 14–44)
MCH RBC QN AUTO: 29.5 PG (ref 26.8–34.3)
MCHC RBC AUTO-ENTMCNC: 30.6 G/DL (ref 31.4–37.4)
MCV RBC AUTO: 96 FL (ref 82–98)
MONOCYTES # BLD AUTO: 0.79 THOUSAND/ΜL (ref 0.17–1.22)
MONOCYTES NFR BLD AUTO: 10 % (ref 4–12)
NEUTROPHILS # BLD AUTO: 5.56 THOUSANDS/ΜL (ref 1.85–7.62)
NEUTS SEG NFR BLD AUTO: 66 % (ref 43–75)
NITRITE UR QL STRIP: NEGATIVE
NRBC BLD AUTO-RTO: 0 /100 WBCS
PH UR STRIP.AUTO: 5.5 [PH]
PLATELET # BLD AUTO: 386 THOUSANDS/UL (ref 149–390)
PMV BLD AUTO: 9.7 FL (ref 8.9–12.7)
POTASSIUM SERPL-SCNC: 4.5 MMOL/L (ref 3.5–5.3)
PROT UR STRIP-MCNC: NEGATIVE MG/DL
QRS AXIS: -59 DEGREES
QRSD INTERVAL: 178 MS
QT INTERVAL: 500 MS
QTC INTERVAL: 500 MS
RBC # BLD AUTO: 3.93 MILLION/UL (ref 3.81–5.12)
SODIUM SERPL-SCNC: 134 MMOL/L (ref 135–147)
SP GR UR STRIP.AUTO: 1.01 (ref 1–1.03)
T WAVE AXIS: 90 DEGREES
UROBILINOGEN UR STRIP-ACNC: <2 MG/DL
VENTRICULAR RATE: 60 BPM
WBC # BLD AUTO: 8.3 THOUSAND/UL (ref 4.31–10.16)

## 2022-08-25 PROCEDURE — 99233 SBSQ HOSP IP/OBS HIGH 50: CPT | Performed by: PHYSICAL MEDICINE & REHABILITATION

## 2022-08-25 PROCEDURE — 97112 NEUROMUSCULAR REEDUCATION: CPT

## 2022-08-25 PROCEDURE — 85025 COMPLETE CBC W/AUTO DIFF WBC: CPT | Performed by: NURSE PRACTITIONER

## 2022-08-25 PROCEDURE — 97535 SELF CARE MNGMENT TRAINING: CPT

## 2022-08-25 PROCEDURE — 80048 BASIC METABOLIC PNL TOTAL CA: CPT | Performed by: NURSE PRACTITIONER

## 2022-08-25 PROCEDURE — 71045 X-RAY EXAM CHEST 1 VIEW: CPT

## 2022-08-25 PROCEDURE — 93005 ELECTROCARDIOGRAM TRACING: CPT

## 2022-08-25 PROCEDURE — 97530 THERAPEUTIC ACTIVITIES: CPT

## 2022-08-25 PROCEDURE — 93010 ELECTROCARDIOGRAM REPORT: CPT | Performed by: INTERNAL MEDICINE

## 2022-08-25 PROCEDURE — 99232 SBSQ HOSP IP/OBS MODERATE 35: CPT | Performed by: INTERNAL MEDICINE

## 2022-08-25 PROCEDURE — 84484 ASSAY OF TROPONIN QUANT: CPT | Performed by: NURSE PRACTITIONER

## 2022-08-25 PROCEDURE — NC001 PR NO CHARGE: Performed by: PHYSICIAN ASSISTANT

## 2022-08-25 PROCEDURE — 81003 URINALYSIS AUTO W/O SCOPE: CPT | Performed by: PHYSICAL MEDICINE & REHABILITATION

## 2022-08-25 PROCEDURE — 97110 THERAPEUTIC EXERCISES: CPT

## 2022-08-25 RX ORDER — BACLOFEN 10 MG/1
5 TABLET ORAL 3 TIMES DAILY
Status: DISCONTINUED | OUTPATIENT
Start: 2022-08-25 | End: 2022-08-29

## 2022-08-25 RX ORDER — BACLOFEN 10 MG/1
5 TABLET ORAL
Status: DISCONTINUED | OUTPATIENT
Start: 2022-08-25 | End: 2022-09-01

## 2022-08-25 RX ORDER — MUSCLE RUB CREAM 100; 150 MG/G; MG/G
CREAM TOPICAL ONCE
Status: COMPLETED | OUTPATIENT
Start: 2022-08-25 | End: 2022-08-25

## 2022-08-25 RX ORDER — NITROGLYCERIN 0.4 MG/1
0.4 TABLET SUBLINGUAL
Status: DISCONTINUED | OUTPATIENT
Start: 2022-08-25 | End: 2022-09-13 | Stop reason: HOSPADM

## 2022-08-25 RX ORDER — FAMOTIDINE 20 MG/1
20 TABLET, FILM COATED ORAL DAILY
Status: DISCONTINUED | OUTPATIENT
Start: 2022-08-25 | End: 2022-09-06

## 2022-08-25 RX ADMIN — FAMOTIDINE 20 MG: 20 TABLET, FILM COATED ORAL at 13:22

## 2022-08-25 RX ADMIN — MENTHOL, METHYL SALICYLATE 1 APPLICATION: 10; 15 CREAM TOPICAL at 17:01

## 2022-08-25 RX ADMIN — BACLOFEN 5 MG: 10 TABLET ORAL at 21:44

## 2022-08-25 RX ADMIN — NEBIVOLOL 5 MG: 5 TABLET ORAL at 08:25

## 2022-08-25 RX ADMIN — GUAIFENESIN 200 MG: 200 SOLUTION ORAL at 21:41

## 2022-08-25 RX ADMIN — VERAPAMIL HYDROCHLORIDE 240 MG: 240 TABLET ORAL at 21:44

## 2022-08-25 RX ADMIN — DABIGATRAN ETEXILATE MESYLATE 150 MG: 150 CAPSULE ORAL at 21:44

## 2022-08-25 RX ADMIN — BACLOFEN 5 MG: 10 TABLET ORAL at 06:11

## 2022-08-25 RX ADMIN — DABIGATRAN ETEXILATE MESYLATE 150 MG: 150 CAPSULE ORAL at 08:25

## 2022-08-25 RX ADMIN — BACLOFEN 5 MG: 10 TABLET ORAL at 16:58

## 2022-08-25 RX ADMIN — LOSARTAN POTASSIUM 100 MG: 50 TABLET, FILM COATED ORAL at 08:25

## 2022-08-25 RX ADMIN — MENTHOL, METHYL SALICYLATE: 10; 15 CREAM TOPICAL at 04:31

## 2022-08-25 RX ADMIN — ACETAMINOPHEN 650 MG: 325 TABLET ORAL at 02:51

## 2022-08-25 RX ADMIN — PRAVASTATIN SODIUM 40 MG: 40 TABLET ORAL at 16:58

## 2022-08-25 RX ADMIN — MAGNESIUM OXIDE TAB 400 MG (241.3 MG ELEMENTAL MG) 400 MG: 400 (241.3 MG) TAB at 08:25

## 2022-08-25 RX ADMIN — MENTHOL, METHYL SALICYLATE: 10; 15 CREAM TOPICAL at 08:26

## 2022-08-25 NOTE — PROGRESS NOTES
08/25/22 0800   Speech/Language/Cognition Assessmetn   Treatment Assessment Attempted to see pt for planned skilled SLP session focusing on cognitive linguistic skills, however, pt was on med hold for RRT which was called earlier in AM  SLP spoke with RN about current medical hold, which was confirmed, along with order from Internal Medicine CRNP  Attempted again at 0930-med hold still active  Will follow up as able appropriate as pt will benefit from further skilled SLP services  Therapy Time missed   Time missed?  Yes   Amount of time missed 30   Reason for time missed Medical hold   Time(s) multiple attempts made 0800, 0930

## 2022-08-25 NOTE — NURSING NOTE
Pt with c/o "heavy feeling in LLE"  No c/o pain or numbess  Positive sensation  Bp 138/64 HR 77 p/o 98 % RA  Contacted Dr Goodman Courser via tiger text  No new orders  Instructed to monitor

## 2022-08-25 NOTE — CASE MANAGEMENT
[facility] and next review 8/30

## 2022-08-25 NOTE — PROGRESS NOTES
Physical Medicine and Rehabilitation Progress Note  Godwin Cohn 80 y o  female MRN: [id number]  Unit/Bed#: -84 Encounter: [id number]    HPI: 20-year-old female with a past medical history of AFib recently on Xarelto, sick sinus syndrome status status post pacemaker placement that is not MRI compatible, hypertension, hyperlipidemia, valvular disease, coronary artery disease developed dense left-sided weakness, dysarthria and presented to the hospital on 08/12/2022  She had systolic blood pressure of 200 on arrival   CT head showed focal area of gliosis within the high right post central gyrus and scattered chronic microvascular ischemic changes with more focal lucency along the anterior limb of the right internal capsule  CTA of head and neck showed near occlusive thrombus at the right MCA bifurcation, mild beating of the mid to distal ICA suspicious mild fibromuscular dysplasia and mild atherosclerotic disease at the bilateral distal carotid arteries  She was not a candidate for MRI due to pacemaker  CT cerebral perfusion study showed 8 mL of mismatch  She was not a candidate for tPA due to bleeding risk from being on Xarelto  She was not a thrombectomy candidate for neurointerventional   Patient was recommended to transition to Pradaxa  Patient was evaluated by skilled therapies and was found to have significant decline in ADLs and ambulation and appears appropriate for admission to [facility]  Chief Complaint: No new issues  Interval History/Subjective: This AM with two episodes of chest pain  The first was reproducible, and improved with baclofen/julia tabares  The second was more pressure  EKG without ischemic change per IM  hsTrop was 29, but 2 hour was 28  Patient denies any N/V  She has a bit of a cough in association  No N/V abdominal pain  No clear SOB   Last BM was 8/24    ROS:  A 10 point review of systems was negative except for what is noted in the HPI     Today's Changes:  1  Increasing Baclofen to 5mg TID with an extra 5mg QHS if needed  - May consider inpatient botox to shoulder and elbow flexor  Would need to reach out to Edith/Rep  2  Increasing Aubrey tabares frequency  3  ?cough with chest - CXR without acute cardiopulmonary disease  Lungs sound good  Possible GERD? Will start some pepcid  Total time spent:  35 minutes, with more than 50% spent counseling/coordinating care  Counseling includes discussion with patient re: progress in therapies, functional issues observed by therapy staff, and discussion with patient his/her current medical state/wellbeing  Coordination of patient's care was performed in conjunction with Internal Medicine service to monitor patient's labs, vitals, and management of their comorbidities  Assessment/Plan:    * Arterial ischemic stroke, MCA (middle cerebral artery), right, acute (HCC)  Assessment & Plan  - R MCA CVA   - CTA head/neck - near occlusive thrombus at the R MCA bifurcation, mild beading of the mid to distal ICAs suspicious for mild fibromuscular dysplasia, and mild atherosclerotic disease at bilateral distal carotid arteries     - no MRI because pacemaker not compatible    - Residual impairments on admission to [facility]: L spastic hemiparesis, bowel/bladder dysfunction, possible cog impairments (assess for other impairments during [facility] course)     Secondary stroke prevention  - repeat CT angio head and neck in 3 months to re-evaluate thrombus is a outpatient  - Antithrombotic: Pradaxa (switched from Xarelto for possible failure)   - Statin  - Optimal management of blood pressure and diabetes  - Patient at increased risk for stroke particularly early in post-stroke period - monitor neuro exam closely with low threshold to repeat imaging  -  patient and if applicable caregiver on optimal stroke management  - Follow-up with neurology and PCP after d/c   - Recommend acute comprehensive interdisciplinary inpatient rehabilitation to include intensive skilled therapies (PT, OT, ST) as outlined with oversight and management by rehabilitation physician as well as inpatient rehab level nursing, case management and weekly interdisciplinary team meetings  - patient does have fair left hand and wrist strength; can use resting hand splint for a few hours during the day and overnight but to not overuse and encourage use hand and wrist  - multi Podus boot on left foot patient can breaks to potentially decrease risk of contracture/skin breakdown   - patient has spastic left upper extremity making subluxation less likely although continue subluxation precautions      At risk for coping difficulty  Assessment & Plan  Supportive counseling  Psychology consult while in [facility]  Discharged on magnesium daily   Continue for now  Monitor level  Neck pain  Assessment & Plan  With taut muscle bands in left cervical paraspinal and trapezius areas  Gentle stretching  Baclofen 5 mg TID with extra QHS dose prn  Monitor closely given GFR     PRN APAP  Consider K-pad or other topicals    At risk for altered urinary elimination  Assessment & Plan  Patient has been using ex-cath suction device - Pure-wick PTA to Arc   - She would like to continue at least at night until mobility improves given risk of accidents and impaired hygiene  - She feels like she does have some control  - Continue just at night for now until mobility improves  - monitor for retention, incontinence, signs/symptoms of UTI  - ensure optimal bowel management   - recommend toileting program Q2-4 H during day and Q4-6H overnight   - bladder scan Q4H, monitor output and PVRs, straight cath >450 or if uncomfortable 250-449      CAD (coronary artery disease)  Assessment & Plan  IM consulted and with overall management at their discretion during [facility] course  Antithrombotic: Pradaxa  Statin  Optimal blood pressure   Outpatient Cardiology follow-up    Per IM  · "Nonobstructive  · Card cath 8/2021 done for borderline abn stress test and found 50% mid RCA/40% mid LAD = no intervention done  · Continue statin  · Was not on ASA as an OP"       Valvular heart disease  Assessment & Plan  Per IM  · "Current ECHO:  LVEF 65%, mod AR/mild AS, mod TR with severely elevated RV systolic pressure, mild-mod MR, mod LAE/mild NEO  · Euvolemic currently but will watch since CTA on admission showed pulm edema and b/l pleural effusions"  -diuretics at their discretion   -Outpatient cardiology follow-up    Muscle spasticity  Assessment & Plan  Left upper extremity significantly (also some L neck tightness/spasms)  Modified Sada scale 3 in shoulder, and 2 in her elbow  Baclofen 5mg TID with extra dose QHS PRN  Monitor given GFR  May be more sleepy initially  Gentle range of motion with therapy  Patient does have movement of the hand and wrist and do want to encourage that - can still consider resting hand splint intermittently  Consider  Botox  Skilled PT OT  Optimal skin hygiene    DARRIUS (acute kidney injury) [facility])  Assessment & Plan  Resolved  8/25 Crea 1 05  Medicine consulted  Hold Lasix and Aldactone  Resume diuretics at discretion of medicine  Monitor labs and output    At risk for venous thromboembolism (VTE)  Assessment & Plan  SCDs, ambulation, and Pradaxa       Pacemaker  Assessment & Plan  Not MRI compatible    Chronic atrial fibrillation ([address] )  Assessment & Plan  IM consulted and with overall management at their discretion during ARC course  Rate control: Verapamil, Nebivolol   Antithrombotic: Pradaxa       Stage 3 chronic kidney disease [facility])  Assessment & Plan  Lab Results   Component Value Date    EGFR 49 08/25/2022    EGFR 49 08/22/2022    EGFR 47 08/21/2022    CREATININE 1 05 08/25/2022    CREATININE 1 06 08/22/2022    CREATININE 1 09 08/21/2022     Given IVF  Crea now back down to ~1 06 near baseline    Avoid nephrotoxic meds  Monitor voiding status  IM consulted  Hyperlipidemia LDL goal <100  Assessment & Plan  Statin    Essential hypertension  Assessment & Plan  Internal medicine consulted and co-management with their service  Monitor vitals with and without activity; monitor for orthostasis  Monitor hemoglobin, electrolytes, kidney function, hydration status   Current meds:  Losartan, nebivolol, Verapamil 240mg daily  - Lasix and Aldactone on hold      Other chest pain  Assessment & Plan  8/25 with chest pain, ultimately felt to be 2/2 to spasticity  Improved with baclofen/robaxin  - delta hsTrop at 2 hr was -1    - EKG showed paced rhythm, no evidence of ischemia   - CXR unremarkable   - 30 min later had another episode that felt more like pressure  Rapid called, but not concerning for ACS  Generally reproducible   - Increased Baclofen and frequency of Kia Dunn  Has a cough with it, and lungs sound good, and CXR unremarkable  - May also be component of GERD  Started on pepcid  Health Maintenance  #Delirium/Sleep: At risk  Environmental interventions  Optimize pain, bowel, bladder, sleep-wake cycle management  #Pain: Tylenol PRN  #Bowel: Last BM 8/24 and continent  Not on regimen - only PRNs  #Bladder: Voiding and mostly continent  Purewick at night to transition once more mobile  #Skin/Pressure Injury Prevention: Turn Q2hr in bed, with weight shifts C84-62gph in wheelchair  #DVT Prophylaxis: Fully anticoagulated on pradaxa  #GI Prophylaxis: PO diet   #Code Status: Full Code  #FEN: Reg/Thins  #Dispo: Team Conference 8/23: JOHNNY 3-4 weeks  Plan to reteam      Objective:    Functional Update:  PT: maxA transfers, mod-maxA bed mobility, totalA x2 ambulation 30', modA wheelchair mobility 150'   OT: Sup eating, maxA grooming, Ax2 bathing, maxA UB dressing, Ax2 LB dressing, Ax2 toileting and toilet transfers  SLP: mild cognitive impairment       Allergies per EMR    Physical Exam:  Temp:  [97 6 °F (36 4 °C)-98 4 °F (36 9 °C)] 97 7 °F (36 5 °C)  HR:  [61-68] 62  Resp:  [17-20] 18  BP: (107-143)/(50-66) 129/62  Oxygen Therapy  SpO2: 98 % (RA)    Gen: No acute distress, Well-nourished, well-appearing  HEENT: Moist mucus membranes, Normocephalic/Atraumatic  Cardiovascular: Regular rate, rhythm, S1/S2  Distal pulses palpable  Heme/Extr: No edema  Pulmonary: Non-labored breathing  Lungs CTAB  : No contreras  GI: Soft, non-tender, non-distended  BS+  Integumentary: Skin is warm, dry  Neuro: AAOx3, Speech is intact  Appropriate to questioning  Increased tone in lateral pec as well as her elbow flexor on the L  Psych: Normal mood and affect  Diagnostic Studies: Reviewed, no new imaging  Laboratory:  Reviewed  See additional labs reviewed in A/P     Results from last 7 days   Lab Units 08/25/22  0452 08/22/22  0458   HEMOGLOBIN g/dL 11 6 12 0   HEMATOCRIT % 37 9 38 6   WBC Thousand/uL 8 30 7 33     Results from last 7 days   Lab Units 08/25/22  0452 08/22/22  0458 08/21/22  0434   BUN mg/dL 32* 31* 33*   POTASSIUM mmol/L 4 5 4 7 4 8   CHLORIDE mmol/L 106 108 108   CREATININE mg/dL 1 05 1 06 1 09            Patient Active Problem List   Diagnosis    Essential hypertension    Hyperlipidemia LDL goal <100    Stage 3 chronic kidney disease (HCC)    Osteoarthritis of both wrists    Chronic atrial fibrillation (HCC)    Tubulovillous adenoma    Gastroesophageal reflux disease without esophagitis    Pacemaker    B12 deficiency    Allergic rhinitis due to pollen    Cavus deformity of foot    Midline cystocele    Hallux abducto valgus, bilateral    Left renal artery stenosis (HCC)    Osteoarthritis of hip    Osteopenia    Pain due to onychomycosis of toenails of both feet    Left atrial enlargement    Lipoma of right upper extremity    Diverticulosis of colon    Abnormal nuclear stress test    Renal insufficiency    Arterial ischemic stroke, MCA (middle cerebral artery), right, acute (HCC)    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 ([facility] [address] )    Anemia    At risk for venous thromboembolism (VTE)    [facility]    Muscle spasticity    Valvular heart disease    CAD (coronary artery disease)    At risk for altered urinary elimination    Neck pain    Healthcare maintenance    At risk for coping difficulty         Medications  Current Facility-Administered Medications   Medication Dose Route Frequency Provider Last Rate    acetaminophen  650 mg Oral Q6H PRN Diana Amezcua MD      baclofen  5 mg Oral TID Lucy Patterson MD      baclofen  5 mg Oral HS PRN Lucy Patterson MD      bisacodyl  10 mg Rectal Daily PRN Diana Amezcua MD      dabigatran etexilate  150 mg Oral Q12H [facility] Diana Amezcua MD      losartan  100 mg Oral Daily Diana Amezcua MD      magnesium oxide  400 mg Oral Daily Diana Amezcua MD      menthol-methyl salicylate   Apply externally BID Lucy Patterson MD      nebivolol  5 mg Oral Daily Diana Amezcua MD      nitroglycerin  0 4 mg Sublingual Q5 Min PRN TOMY Sosa      polyethylene glycol  17 g Oral Daily PRN Diana Amezcua MD      pravastatin  40 mg Oral Daily With Dionne Ji MD      verapamil  240 mg Oral HS Diana Amezcua MD            ** Please Note: Fluency Direct voice to text software may have been used in the creation of this document   **

## 2022-08-25 NOTE — PLAN OF CARE
Problem: MOBILITY - ADULT  Goal: Maintain or return to baseline ADL function  Description: INTERVENTIONS:  -  Assess patient's ability to carry out ADLs; assess patient's baseline for ADL function and identify physical deficits which impact ability to perform ADLs (bathing, care of mouth/teeth, toileting, grooming, dressing, etc )  - Assess/evaluate cause of self-care deficits   - Assess range of motion  - Assess patient's mobility; develop plan if impaired  - Assess patient's need for assistive devices and provide as appropriate  - Encourage maximum independence but intervene and supervise when necessary  - Involve family in performance of ADLs  - Assess for home care needs following discharge   - Consider OT consult to assist with ADL evaluation and planning for discharge  - Provide patient education as appropriate  Outcome: Progressing  Goal: Maintains/Returns to pre admission functional level  Description: INTERVENTIONS:  - Set and communicate daily mobility goal to care team and patient/family/caregiver  - Collaborate with rehabilitation services on mobility goals if consulted  - Perform Range of Motion 3 times a day  - Reposition patient every 2 hours    - Dangle patient 3 times a day  - Stand patient 3 times a day  - Ambulate patient 3 times a day  - Out of bed to chair 3 times a day   - Out of bed for meals 3 times a day  - Out of bed for toileting  - Record patient progress and toleration of activity level   Outcome: Progressing     Problem: Prexisting or High Potential for Compromised Skin Integrity  Goal: Skin integrity is maintained or improved  Description: INTERVENTIONS:  - Identify patients at risk for skin breakdown  - Assess and monitor skin integrity  - Assess and monitor nutrition and hydration status  - Monitor labs   - Assess for incontinence   - Turn and reposition patient  - Assist with mobility/ambulation  - Relieve pressure over bony prominences  - Avoid friction and shearing  - Provide appropriate hygiene as needed including keeping skin clean and dry  - Evaluate need for skin moisturizer/barrier cream  - Collaborate with interdisciplinary team   - Patient/family teaching  - Consider wound care consult   Outcome: Progressing     Problem: Potential for Falls  Goal: Patient will remain free of falls  Description: INTERVENTIONS:  - Educate patient/family on patient safety including physical limitations  - Instruct patient to call for assistance with activity   - Consult OT/PT to assist with strengthening/mobility   - Keep Call bell within reach  - Keep bed low and locked with side rails adjusted as appropriate  - Keep care items and personal belongings within reach  - Initiate and maintain comfort rounds  - Make Fall Risk Sign visible to staff  - Offer Toileting every 2-4 Hours, in advance of need  - Initiate/Maintain bed/chair alarm  - Obtain necessary fall risk management equipment: nonskid footwear  - Apply yellow socks and bracelet for high fall risk patients  - Consider moving patient to room near nurses station  Outcome: Progressing     Problem: PAIN - ADULT  Goal: Verbalizes/displays adequate comfort level or baseline comfort level  Description: Interventions:  - Encourage patient to monitor pain and request assistance  - Assess pain using appropriate pain scale  - Administer analgesics based on type and severity of pain and evaluate response  - Implement non-pharmacological measures as appropriate and evaluate response  - Consider cultural and social influences on pain and pain management  - Notify physician/advanced practitioner if interventions unsuccessful or patient reports new pain  Outcome: Progressing     Problem: INFECTION - ADULT  Goal: Absence or prevention of progression during hospitalization  Description: INTERVENTIONS:  - Assess and monitor for signs and symptoms of infection  - Monitor lab/diagnostic results  - Monitor all insertion sites, i e  indwelling lines, tubes, and drains  - Monitor endotracheal if appropriate and nasal secretions for changes in amount and color  - Clarence appropriate cooling/warming therapies per order  - Administer medications as ordered  - Instruct and encourage patient and family to use good hand hygiene technique  - Identify and instruct in appropriate isolation precautions for identified infection/condition  Outcome: Progressing     Problem: DISCHARGE PLANNING  Goal: Discharge to home or other facility with appropriate resources  Description: INTERVENTIONS:  - Identify barriers to discharge w/patient and caregiver  - Arrange for needed discharge resources and transportation as appropriate  - Identify discharge learning needs (meds, wound care, etc )  - Arrange for interpretive services to assist at discharge as needed  - Refer to Case Management Department for coordinating discharge planning if the patient needs post-hospital services based on physician/advanced practitioner order or complex needs related to functional status, cognitive ability, or social support system  Outcome: Progressing

## 2022-08-25 NOTE — QUICK NOTE
Minimal UOP noted overnight  Nursing scanned bladder for >999cc  Patient voided 100cc with still 999cc on PVR  Catheterizing and sending for UA  Volumes not previously recorded in chart  Will initiate Q6hr timed void with scan and PRN cath  Med hold this AM for chest pain       Cielo Burgos MD  Physical Medicine and Rehabilitation

## 2022-08-25 NOTE — NURSING NOTE
Pt also c/o left neck mild pain at 0250  Tylenol was administered  At  0325 Pt c/o left chest pain rating 7/10  Pt reports pain with inspiration  Pain under left breast and reproducible  Vital signs stable  No SOB  Peña Flies notified  Order received for EKG and labs  EKG Pic sent and lab work completed  Repositioned pt in bed  Telma tabares applied as ordered  will continue to monitor pt

## 2022-08-25 NOTE — NURSING NOTE
At 0710 pt c/o neck pain rating 10/10, and chest pain 7/10 and pt said it feels like someone pushing on her chest  Pain with inspiration  Also c/o left leg feels heavy  Denies N/T in left leg  Vital signs  stable  EKG and 2nd set of troponin completed  RRT called  RRT assessed pt  Stat chest X-ray ordered  Report given to oncoming nurse  Will continue to monitor pt

## 2022-08-25 NOTE — NURSING NOTE
Patient alert and oriented to person place and time  Denies chest pain at present time  C/o of mild pain on left side of neck, routine Bengay applied as ordered  Patient tolerated taking morning medications without difficulty and is eating breakfast independently at present time  Luis Miguel Paris made aware of two hour troponin level results

## 2022-08-25 NOTE — PROGRESS NOTES
08/25/22 1400   Clinical Encounter Type   Visited With Patient   Routine Visit Follow-up   Sacramental Encounters   Communion Given Indicator Yes   Sacrament Other Other (Comment)  (Tennyson by Fr Bean Pool)

## 2022-08-25 NOTE — OCCUPATIONAL THERAPY NOTE
Stroke Education Series    Pt participated in skilled Stroke Education Series in an individual setting to address the topic of Stroke 101: Understanding the Basics of Stroke in both verbal and written formats  Education within this session reviewed the basic structural and functional components of the brain and included information on the causes of stroke, related signs/symptoms, risk factors, and the process of stroke rehabilitation  The goal of this education was to provide the patient with general understanding of how the brain functions and how a stroke can impact his/her functional mobility and independence  In addition, the intention of this education is to provide the patient with the information to reduce the risk of a second stroke  Following education, pt's response to education is: verbalizes understanding, demonstrates understanding and needs reinforcment   To individualize the education, the following topics were included based upon the patients' past and current medical history: atrial fibrillation, hyperlipidemia and hypertension  Additional topics that were covered include weakness, decreased coordination, cognitive changes, fall prevention and prevention of new conditions and/or worsening condition  Pt very engaging in stroke education, but would benefit from continued edu to ensure maximum carryover of new learned information         Start Time: 1230    End Time: 1300

## 2022-08-25 NOTE — PROGRESS NOTES
08/25/22 1001   Pain Assessment   Pain Assessment Tool 0-10   Pain Score 5   Pain Location/Orientation Orientation: Left; Location: Neck   Restrictions/Precautions   Precautions Bed/chair alarms;Cognitive; Fall Risk;Supervision on toilet/commode   Weight Bearing Restrictions No   ROM Restrictions No   Lifestyle   Autonomy "Last night was such a bad night"   Oral Hygiene   Type of Assistance Needed Supervision   Physical Assistance Level No physical assistance   Comment seated upright in bed to complete oral care; pt continues to demo ability to manipulate grooming tasks with use of L hand   Oral Hygiene CARE Score 4   ROM - Left Upper Extremities    L Shoulder AROM; Flexion;ABduction   L Elbow AROM;Elbow flexion;Elbow extension   L Wrist AROM; Wrist flexion;Wrist extension   LUE ROM Comment AROM to L UE focusing on shoulder flexion, abduction, elbow flex/ext, forearm pronation/supination, wrist flex/ext  inc in ROM noted especially in shoulder ROM with inc repetitions  no c/o pain noted during ROM   Coordination   Gross Motor pt engages in L UE [address] [facility] performing func reach in various planes in order to promote AROM, strength, decrease tone  slight impairments with motor planning noted especially when "letting go" of retrieved items, occasionally requiring min VCs to "let go"  Cognition   Overall Cognitive Status Impaired   Arousal/Participation Alert; Cooperative   Attention Attends with cues to redirect   Orientation Level Oriented X4   Memory Decreased short term memory   Following Commands Follows one step commands with increased time or repetition   Activity Tolerance   Activity Tolerance Patient tolerated treatment well   Assessment   Treatment Assessment pt engages in brief 30 minute skilled OT session focusing on oral care, L UE NMR  see above for full func details   this YOU was informed of a MEDICAL HOLD until assessed by physician, however medical hold order removed from orders tab  per team, pt cleared to participate to her tolerance so pt agreeable to bed level tasks  only obtained 30 minutes as pt scheduled for 60 minute PT session immediately following OT session  pt continues to demo decreased L UE ROM, strength, impairments with motor planning especially in L hand when letting go of whatever she is holding on to  pt remains motivated for therapy--no c/o inc in neck pain, chest pain during 30 min session  plan to complete stroke education in PM session as able  recommend continued skilled care to focus on ADL retraining, func transfers, standing horacio/bal, L UE NMR, func cog, stroke education, in order to decrease burden of care at d/c  Prognosis Fair   Problem List Decreased strength;Decreased range of motion;Decreased endurance; Impaired balance;Decreased mobility; Decreased cognition;Decreased coordination; Impaired judgement;Decreased safety awareness; Impaired tone;Pain   Barriers to Discharge Inaccessible home environment;Decreased caregiver support   Plan   Treatment/Interventions ADL retraining;Functional transfer training; Therapeutic exercise; Endurance training;Cognitive reorientation;Patient/family training;Equipment eval/education; Compensatory technique education   OT Therapy Minutes   OT Time In 1000   OT Time Out 1030   OT Total Time (minutes) 30   OT Mode of treatment - Individual (minutes) 30   OT Mode of treatment - Concurrent (minutes) 0   OT Mode of treatment - Group (minutes) 0   OT Mode of treatment - Co-treat (minutes) 0   OT Mode of Treatment - Total time(minutes) 30 minutes   OT Cumulative Minutes 330   Therapy Time missed   Time missed?  No

## 2022-08-25 NOTE — PROGRESS NOTES
08/25/22 1230   Pain Assessment   Pain Assessment Tool 0-10   Pain Score No Pain   Restrictions/Precautions   Precautions Bed/chair alarms;Cognitive; Fall Risk;Supervision on toilet/commode   Weight Bearing Restrictions No   ROM Restrictions No   Braces or Orthoses Other (Comment)  (multipodus boot to LLE donned end of session, use of L RH splint for 20 mins during session however pt c/o pain in hand with prolonged wearing)   Cognition   Overall Cognitive Status Impaired   Arousal/Participation Alert; Cooperative   Attention Attends with cues to redirect   Orientation Level Oriented X4   Memory Decreased short term memory   Following Commands Follows one step commands with increased time or repetition   Activity Tolerance   Activity Tolerance Patient tolerated treatment well   Assessment   Treatment Assessment pt engages in brief 30 minute skilled OT session focusing on stroke education: stroke 101  see separate note for details  pt noted with inc fatigue this PM session due to lack of sleep from previous night, politely declining OOB tasks but agreeable to stroke education  recommend continued skilled care to focus on ADL retraining, func transfers, standing horacio/bal, L UE NMR, func cog, stroke education, in order to decrease burden of care at d/c  Prognosis Fair   Problem List Decreased strength;Decreased range of motion;Decreased endurance; Impaired balance;Decreased mobility; Decreased cognition;Decreased coordination; Impaired judgement;Decreased safety awareness; Impaired tone;Pain   Barriers to Discharge Inaccessible home environment;Decreased caregiver support   Plan   Treatment/Interventions ADL retraining;Functional transfer training; Therapeutic exercise; Endurance training;Cognitive reorientation;Patient/family training;Equipment eval/education; Compensatory technique education   OT Therapy Minutes   OT Time In 1230   OT Time Out 1300   OT Total Time (minutes) 30   OT Mode of treatment - Individual (minutes) 30   OT Mode of treatment - Concurrent (minutes) 0   OT Mode of treatment - Group (minutes) 0   OT Mode of treatment - Co-treat (minutes) 0   OT Mode of Treatment - Total time(minutes) 30 minutes   OT Cumulative Minutes 360   Therapy Time missed   Time missed?  No

## 2022-08-25 NOTE — ASSESSMENT & PLAN NOTE
Denies CP currently - remains resolved    Per prior provider  "8/25 with chest pain, ultimately felt to be 2/2 to spasticity  Improved with baclofen/robaxin  - delta hsTrop at 2 hr was -1    - EKG showed paced rhythm, no evidence of ischemia   - CXR unremarkable   - 30 min later had another episode that felt more like pressure  Rapid called, but not concerning for ACS  Generally reproducible   - Increased Baclofen and frequency of Williemae Gone  Has a cough with it, and lungs sound good, and CXR unremarkable  - May also be component of GERD  Started on pepcid  Also on throat spray   Fluticasone was irritating and didn't make a difference    - Had SLP eval swallow - which was fine"

## 2022-08-25 NOTE — PROGRESS NOTES
Internal Medicine Progress Note  Patient: Sapphire Bautista  Age/sex: 80 y o  female  Medical Record #: [id number]      ASSESSMENT/PLAN: (Interval History)  Sapphire Bautista is seen and examined and management for following issues:    Acute embolic right MCA CVA  · Had near occlusion of right MCA  · Felt to be a Xarelto failure and was switched to Pradaxa  · ECHO w/o thrombus  · Continue statin  · On Baclofen for tone left arm     Chronic atrial fibrillation  · Sees Dr Jocelyn Giles as OP  · Rates controlled  · On Pradaxa now, Xarelto stopped     PPM  · VVI  · Is not MRI conditional     Valvular disease  · Current ECHO:  LVEF 65%, mod AR/mild AS, mod TR with severely elevated RV systolic pressure, mild-mod MR, mod LAE/mild NEO  · Euvolemic currently     HTN  · Home:  Verapamil 240mg qhs/Cozaar 480 mg qd/Bystolic 5mg qd/lasix 66XY qd/Aldactone 25mg qd  · Here:  Verapamil 240mg qhs/Cozaar 854SE qd/Bystolic 5mg qd  · OP note states has left RA stenosis but no testing in OP records or Care Everywhere to verify  · No changes again today     HLD  · Home:  Crestor 10mg qd = Neuro rec when discharged to reduce to 5mg qd  · Here:  Pravachol 40mg qd     DARRIUS  · S/p NS 1 liter   · Back to baseline  · Continue to hold Lasix and Aldactone for now     CAD  · Nonobstructive  · Card cath 8/2021 done for borderline abn stress test and found 50% mid RCA/40% mid LAD = no intervention done  · Continue statin  · Was not on ASA as an OP     Hypomagnesemia  · Takes here and at home 400 mg MagOx  · Mg level 8/20 was 2 4     Left chest pain  · Had left neck pain last night (not new for her) then pain under left breast = reproducible with palpation  · EKG was 100% v-paced but no obvious acute changes; trops ordered; Bengay applied  · At the time felt likely M/S from spasm  [place] asleep then improved when woke up at 0700  She then had worsening pain in chest and described pressure so RR called    · Trops = 29/-, 28/-1, 28/-1  · etio is her spasticity and Dr Harinder Guthrie inc Baclofen to 5mg TID and 5mg qhs prn     Cough  · Have not witnessed her coughing but she endorses  · Clear-whitish secretions  · CXR negative this AM  · Dr Harinder Guthrie added pepcid in case is 2/2 GERD  · Will add prn Robitussin       DC planning: Reteam       The above assessment and plan was reviewed and updated as determined by my evaluation of the patient on 8/25/2022  Labs:   Results from last 7 days   Lab Units 08/25/22  0452 08/22/22  0458   WBC Thousand/uL 8 30 7 33   HEMOGLOBIN g/dL 11 6 12 0   HEMATOCRIT % 37 9 38 6   PLATELETS Thousands/uL 386 360     Results from last 7 days   Lab Units 08/25/22  0452 08/22/22  0458   SODIUM mmol/L 134* 134*   POTASSIUM mmol/L 4 5 4 7   CHLORIDE mmol/L 106 108   CO2 mmol/L 24 22   BUN mg/dL 32* 31*   CREATININE mg/dL 1 05 1 06   CALCIUM mg/dL 9 3 9 3                   Review of Scheduled Meds:  Current Facility-Administered Medications   Medication Dose Route Frequency Provider Last Rate    acetaminophen  650 mg Oral Q6H PRN Donald Baig MD      baclofen  5 mg Oral TID Maximino Hernandes MD      baclofen  5 mg Oral HS PRN Maximino Hernandes MD      bisacodyl  10 mg Rectal Daily PRN Donald Baig MD      dabigatran etexilate  150 mg Oral Q12H [address] Donald Baig MD      losartan  100 mg Oral Daily Donald Baig MD      magnesium oxide  400 mg Oral Daily Donald Baig MD      menthol-methyl salicylate   Apply externally BID Maximino Hernandes MD      nebivolol  5 mg Oral Daily Donald Baig MD      nitroglycerin  0 4 mg Sublingual Q5 Min PRN TOMY Rodriguez      polyethylene glycol  17 g Oral Daily PRN Donald Baig MD      pravastatin  40 mg Oral Daily With Ron Vitale MD      verapamil  240 mg Oral HS Donald Baig MD         Subjective/ HPI: Patient seen and examined  Patients overnight issues or events were reviewed with nursing or staff during rounds or morning huddle session   New or overnight issues include the following:     Had left neck/chest pain during night  Currently painfree    ROS:   A 10 point ROS was performed; negative except as noted above  Imaging:     XR chest portable   Final Result by Lynn Dunne MD (12/34 [id number])      No acute cardiopulmonary disease  Workstation performed: [id number]             *Labs /Radiology studies reviewed  *Medications reviewed and reconciled as needed  *Please refer to order section for additional ordered labs studies  *Case discussed with primary attending during morning huddle case rounds    Physical Examination:  Vitals:   Vitals:    08/25/22 0700 08/25/22 0716 08/25/22 0718 08/25/22 0823   BP: 129/62 129/62 143/66 129/62   BP Location: Right arm   Right arm   Pulse: 61 61 67 62   Resp: 17   18   Temp: 97 7 °F (36 5 °C)   97 7 °F (36 5 °C)   TempSrc: Oral   Oral   SpO2: 98%  98%    Weight:       Height:           General Appearance: no distress, conversive  HEENT: PERRLA, conjuctiva normal; oropharynx clear; mucous membranes moist   Neck:  Supple, normal ROM  Lungs: CTA, normal respiratory effort, no retractions, expiratory effort normal  CV: regular rate and rhythm; no rubs/murmurs/gallops, PMI normal   ABD: soft; ND/NT; +BS  EXT: no edema  Skin: normal turgor, normal texture, no rashes  Psych: affect normal, mood normal  Neuro: AAO      The above physical exam was reviewed and updated as determined by my evaluation of the patient on 8/25/2022  Invasive Devices  Report    Drain  Duration           External Urinary Catheter 1 day                   VTE Pharmacologic Prophylaxis: Pradaxa  Code Status: Level 1 - Full Code  Current Length of Stay: 6 day(s)      Total time spent:  30 minutes with more than 50% spent counseling/coordinating care  Counseling includes discussion with patient re: progress  and discussion with patient of his/her current medical state/information   Coordination of patient's care was performed in conjunction with primary service  Time invested included review of patient's labs, vitals, and management of their comorbidities with continued monitoring  In addition, this patient was discussed with medical team including physician and advanced extenders  The care of the patient was extensively discussed and appropriate treatment plan was formulated unique for this patient  ** Please Note:  voice to text software may have been used in the creation of this document   Although proof errors in transcription or interpretation are a potential of such software**

## 2022-08-25 NOTE — RAPID RESPONSE
Rapid Response Note  Sunil Nelson 80 y o  female MRN: [id number]  Unit/Bed#: -01 Encounter: [id number]    Rapid Response Notification(s):   Response called date/time:  8/25/2022 7:15 AM  Response team arrival date/time:  8/25/2022 7:16 AM  Response end date/time:  8/25/2022 7:30 AM  Location of pre-procedure assessment: Rehab  Rapid response location:  Acute rehab center  Reason for rapid response team: chest pain  Rapid Response Intervention(s):   Airway:  None  Breathing:  None  Circulation:  Electrocardiogram  Fluids administered:  None  Medications administered:  None       Assessment:   · Chest pain  · Recent CVA    Plan:   · Troponins pending, EKG wnl  · Appreciate ARC recommendations regarding pain medications     Rapid Response Outcome:   Transfer:  Remain on floor  Primary service notified of transfer: Yes    Code Status: Level 1 (Full Code)      Family notified of transfer: no  Family member contacted: n/a     Background/Situation:   Sunil Nelson is a 80 y o  female who was admitted to the hospital initially with CVA, ultimately d/c'ed to [facility]  Today, rapid response was called for chest pain and left neck pain  Troponins already in process at time of call  Chest pain reproducible, neck pain chronic  Hemodynamically stable  Review of Systems   HENT:        Neck pain   Cardiovascular: Positive for chest pain  Objective:   Vitals:    08/25/22 0700 08/25/22 0716 08/25/22 0718 08/25/22 0823   BP: 129/62 129/62 143/66 129/62   BP Location: Right arm   Right arm   Pulse: 61 61 67 62   Resp: 17   18   Temp: 97 7 °F (36 5 °C)   97 7 °F (36 5 °C)   TempSrc: Oral   Oral   SpO2: 98%  98%    Weight:       Height:         Physical Exam  Vitals and nursing note reviewed  Constitutional:       General: She is not in acute distress  HENT:      Head: Normocephalic and atraumatic  Eyes:      Pupils: Pupils are equal, round, and reactive to light     Cardiovascular:      Rate and Rhythm: Normal rate and regular rhythm  Heart sounds: Normal heart sounds  Heart sounds not distant  No murmur heard  No friction rub  No gallop  Pulmonary:      Effort: Pulmonary effort is normal       Breath sounds: Normal breath sounds  No decreased breath sounds, wheezing, rhonchi or rales  Chest:      Comments: Reproducible chest pain  Abdominal:      General: There is no distension  Palpations: Abdomen is soft  Tenderness: There is no abdominal tenderness  Musculoskeletal:      Right lower leg: No edema  Left lower leg: No edema  Skin:     General: Skin is warm and dry  Neurological:      General: No focal deficit present  Mental Status: She is alert and oriented to person, place, and time  Psychiatric:         Behavior: Behavior is cooperative               Jose Walker PA-C

## 2022-08-25 NOTE — PROGRESS NOTES
08/25/22 1030   Pain Assessment   Pain Assessment Tool 0-10   Pain Score 10 - Worst Possible Pain   Pain Location/Orientation Orientation: Left; Location: Neck  (upper trap)   Effect of Pain on Daily Activities pt reports use of MHP did not help with pain; per Dr Scarlet Rodriguez she will increase mm relaxer and pt cont to use Bengay  Can trial gentle STM tomorrow and gentle stretches as tolerated by pt   Restrictions/Precautions   Precautions Bed/chair alarms;Cognitive; Fall Risk;Supervision on toilet/commode   Weight Bearing Restrictions No   ROM Restrictions No   Braces or Orthoses   (multipodus boot to LLE donned end of session, use of L RH splint for 20 mins during session however pt c/o pain in hand with prolonged wearing)   General   Change In Medical/Functional Status Per Dr Scarlet Rodriguez, pt okay to do therapy today however should be gentle; elected to perform bed level activity this session as pt did not want to get OOB   Subjective   Subjective "I don't feel good today"   Roll Left and Right   Type of Assistance Needed Physical assistance   Physical Assistance Level 76% or more   Comment with use of bedrails required maxAx1 to R side, CS to L side to scooby brief start of session   Roll Left and Right CARE Score 2   Transfer Bed/Chair/Wheelchair   Findings only performed bed level activity this session   Car Transfer   Reason if not Attempted Environmental limitations   Car Transfer CARE Score 10   Walk 50 Feet with Two Turns   Reason if not Attempted Medical concerns   Walk 50 Feet with Two Turns CARE Score 88   Walk 150 Feet   Reason if not Attempted Medical concerns   Walk 150 Feet CARE Score 88   Walking 10 Feet on Uneven Surfaces   Reason if not Attempted Medical concerns   Walking 10 Feet on Uneven Surfaces CARE Score 88   Ambulation   Findings can resume ambulation tomorrow pending medical status   Curb or Single Stair   Reason if not Attempted Medical concerns   1 Step (Curb) CARE Score 88   4 Steps   Reason if not Attempted Activity not applicable   4 Steps CARE Score 9   12 Steps   Reason if not Attempted Activity not applicable   12 Steps CARE Score 9   Picking Up Object   Reason if not Attempted Medical concerns   Picking Up Object CARE Score 88   Therapeutic Interventions   Strengthening L Knee flex/ext with AAROM 4x3, L SAQs AAROM 2x10, L hip abd slides AAROM 2x5, b/l hip add in hooklying 2x5, bridges 2x5   Flexibility R hamstring/gastroc stretch 10x15 sec stretch to tolerance; LLE PNF D1 and D2 PROM to reduce tone as pt rigid start of session and difficulty performing ROM, improved with PNF performed fo 4 mins intermittently  Also performed PROM knee/hip flex and ext between exercises to reduce rigidity for 30 seconds which improved ROM; L hip abd stretch 3x30 sec hold   Other Comments   Comments BP supine 145/64, HR 60, SPO2 96%, no C/O chest pain t/o session   Assessment   Treatment Assessment 60 min skilled PT session focusing on LE ROM and strengthening  RRT called on pt this a m  as pt with c/o chest pain  Per Dr Sanket Laws okay to see pt but to keep session gentle and monitor for sx's  Pt with no c/o chest pain this session or SOB, vitals WNL  Pt c/o 10/10 L upper trap pain even at rest  Can possibly trial STM to pt's L upper trap tomorrow and gentle stretching, Dr Sanket Laws also making changes to pt's medication  Noted with LE movements that pt with rigidity when trying to activate LLE  Was able to passively range pt then once pt relaxed but does take time to achieve full ROM due to hyertonicity  Pending medical clearance can resume NPP interventions tomorrow for stroke recovery and to improve balance, coordination, righting reactions and motor planning to maximize pt's independence with mobiities, transfer trng, w/c propulsion, LE ROM and strengthening  Problem List Decreased strength;Decreased range of motion;Decreased endurance; Impaired balance;Decreased mobility; Decreased cognition;Decreased coordination; Impaired judgement;Decreased safety awareness; Impaired tone;Pain   Barriers to Discharge Inaccessible home environment;Decreased caregiver support   PT Barriers   Functional Limitation Car transfers; Ramp negotiation;Stair negotiation;Transfers;Standing;Walking; Wheelchair management   Plan   Treatment/Interventions Functional transfer training;LE strengthening/ROM; Elevations; Therapeutic exercise; Endurance training;Patient/family training;Cognitive reorientation; Bed mobility;Gait training; Compensatory technique education   Progress Progressing toward goals   Recommendation   PT Discharge Recommendation   (tbd)   PT Therapy Minutes   PT Time In 1030   PT Time Out 1130   PT Total Time (minutes) 60   PT Mode of treatment - Individual (minutes) 60   PT Mode of treatment - Concurrent (minutes) 0   PT Mode of treatment - Group (minutes) 0   PT Mode of treatment - Co-treat (minutes) 0   PT Mode of Treatment - Total time(minutes) 60 minutes   PT Cumulative Minutes 510   Therapy Time missed   Time missed?  No

## 2022-08-25 NOTE — PROGRESS NOTES
NEUROPSYCHOLOGY  CLINICAL PROGRESS NOTE   Sapphire Bautista 80 y o  :1940 female MRN: [id number]  DOS:22   Unit/Bed#: -01 Encounter: [id number]      Requested by (Physician/Service): Gudeila Headley MD    HPI: Sapphire Bautista 80year-old female with a past medical history of Afib, presented to [address] [place] [address] on 22 with dense left-sided weakness and dysarthria found to be embolic right MCA CVA   This was felt to be a Xarelto failure hence the patient was switched to Pradaxa as anticoagulants Her past medical history is significant for sick sinus syndrome, hypertension, hyperlipidemia, valvular disease, coronary artery disease  She is status post pacemaker placement that is not MRI compatible    She had systolic blood pressure of 200 on arrival   CT head showed focal area of gliosis within the high right post central gyrus and scattered chronic microvascular ischemic changes with more focal lucency along the anterior limb of the right internal capsule   CTA of head and neck showed near occlusive thrombus at the right MCA bifurcation, mild beating of the mid to distal ICA suspicious mild fibromuscular dysplasia and mild atherosclerotic disease at the bilateral distal carotid arteries   She was not a candidate for MRI due to pacemaker   CT cerebral perfusion study showed 8 mL of mismatch   She was not a candidate for tPA due to bleeding risk from being on Xarelto   She was not a thrombectomy candidate for neurointerventional   Patient was recommended to transition to Pradaxa   Patient was evaluated by skilled therapies and was found to have significant decline in ADLs and ambulation and appears appropriate for admission to [address] [place] [address]    Past Medical History:   Diagnosis Date    A-fib [facility])     Anemia     Arthritis     Breast pain, right     Chest pain on breathing     Colon polyp     Cough  Diverticulosis     Encounter for screening colonoscopy     H/O sick sinus syndrome     Heart murmur     High cholesterol     History of atrial fibrillation     Rate ontrolled  On xarelto 15mg (creatinine 50) Followed by Dr Shannon Silva with Cardiology     History of weight loss     Report loose fitting clothes  Weight stable (3lbs difference)  Last colo showed small tubular adenoma x2  Breast biopsy last showed fibrocystic changes  TSH wnl  Will check cbc, bmp, spep          Hx of long term use of blood thinners     Hypertension     Irregular heart beat     Pacemaker     Preop examination     Shortness of breath     Viral bronchitis        Patient Active Problem List    Diagnosis Date Noted    Valvular heart disease 08/20/2022    CAD (coronary artery disease) 08/20/2022    At risk for altered urinary elimination 08/20/2022    Neck pain 08/20/2022   [address] maintenance 08/20/2022    At risk for coping difficulty 08/20/2022    Anemia 08/19/2022    At risk for venous thromboembolism (VTE) 08/19/2022    DARRIUS (acute kidney injury) ([address] ) 08/19/2022    Muscle spasticity 08/19/2022    Arterial ischemic stroke, MCA (middle cerebral artery), right, acute ([address] ) 08/12/2022    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 ([address] ) 08/12/2022    Renal insufficiency     Abnormal nuclear stress test 08/20/2021    Diverticulosis of colon 05/20/2019    Pacemaker 02/26/2018    Tubulovillous adenoma 02/09/2018    Gastroesophageal reflux disease without esophagitis 02/09/2018    Other chest pain 12/08/2017    Essential hypertension 12/08/2017    Hyperlipidemia LDL goal <100 12/08/2017    Stage 3 chronic kidney disease ([address] ) 12/08/2017    Osteoarthritis of both wrists 12/08/2017    Chronic atrial fibrillation ([address] ) 12/08/2017    Cavus deformity of foot 06/27/2017    Hallux abducto valgus, bilateral 06/27/2017    Lipoma of right upper extremity 05/17/2017    Pain due to onychomycosis of toenails of both feet 01/20/2017    Allergic rhinitis due to pollen 10/12/2015    Midline cystocele 12/23/2014    Osteoarthritis of hip 07/17/2014    B12 deficiency 12/06/2013    Osteopenia 11/15/2013    Left renal artery stenosis (HCC) 08/27/2013    Left atrial enlargement 08/27/2013       Body mass index is 24 99 kg/m²  Past Medical History:     Past Medical History:   Diagnosis Date    A-fib ([address] )     Anemia     Arthritis     Breast pain, right     Chest pain on breathing     Colon polyp     Cough     Diverticulosis     Encounter for screening colonoscopy     H/O sick sinus syndrome     Heart murmur     High cholesterol     History of atrial fibrillation     Rate ontrolled  On xarelto 15mg (creatinine 50) Followed by Dr Daniel Ruff with Cardiology     History of weight loss     Report loose fitting clothes  Weight stable (3lbs difference)  Last colo showed small tubular adenoma x2  Breast biopsy last showed fibrocystic changes  TSH wnl  Will check cbc, bmp, spep   Hx of long term use of blood thinners     Hypertension     Irregular heart beat     Pacemaker     Preop examination     Shortness of breath     Viral bronchitis         Past Surgical History:     Past Surgical History:   Procedure Laterality Date    BREAST BIOPSY Right 08/17/2006    ultrasound guided Percutaneous Needle Core -Benign    CATARACT EXTRACTION      CATARACT EXTRACTION W/ INTRAOCULAR LENS  IMPLANT, BILATERAL      COLONOSCOPY      COLONOSCOPY N/A 5/22/2018    Procedure: COLONOSCOPY;  Surgeon: Matty Strickland DO;  [facility];   Service: Gastroenterology    [facility] / Sekou Morley Right     as a child after a fall    MAMMO STEREOTACTIC BREAST BIOPSY RIGHT (ALL INC) Right 10/2014    benign    NE CMBND ANTERPOST COLPORRAPHY W/CYSTO N/A 3/17/2016    Procedure: COLPORRHAPHY ANTERIOR POSTERIOR ;  Surgeon: Vee Stanford MD;  [facility];  Service: Gynecology    ME COLONOSCOPY FLX DX W/COLLJ SPEC WHEN PFRMD N/A 4/4/2019    Procedure: COLONOSCOPY;  Surgeon: Dmitry Lara DO;  Location: [facility]; Service: Gastroenterology    ME CYSTOURETHROSCOPY N/A 3/17/2016    Procedure: CYSTOSCOPY;  Surgeon: Marek Martinez MD;  [facility];  Service: Gynecology    ME ESOPHAGOGASTRODUODENOSCOPY TRANSORAL DIAGNOSTIC N/A 4/16/2018    Procedure: EGD AND COLONOSCOPY;  Surgeon: Dmitry Lara DO;  Location: [facility]; Service: Gastroenterology    ME LAP,SURG,COLECTOMY, PARTIAL, W/ANAST Right 1/13/2022    Procedure: Diagnostic laparoscopy, laparscopic right colectomy;  Surgeon: Aracelis Pantoja MD;  Location[facility];  Service: Colorectal    ME REVAGINAL PROLAPSE,SACROSP LIG N/A 3/17/2016    Procedure: COLPOPEXY VAGINAL EXTRAPERITONEAL (VEC) ANTERIOR ;  Surgeon: Marek Martinez MD;  [facility];  Service: Gynecology    ME SLING OPER STRES INCONTINENCE N/A 3/17/2016    Procedure: INSERTION PUBOVAGINAL SLING SINGLE INCISION ;  Surgeon: Marek Martinez MD;  [facility];  Service: Gynecology         Allergies: Allergies   Allergen Reactions    Other Itching     Bandaids    Tape [Medical Tape] Itching         Family and Social Support:   No data recorded    Social History:    Social History     Socioeconomic History    Marital status:       Spouse name: None    Number of children: None    Years of education: None    Highest education level: None   Occupational History    Occupation: retired   Tobacco Use    Smoking status: Never Smoker    Smokeless tobacco: Never Used   Vaping Use    Vaping Use: Never used   Substance and Sexual Activity    Alcohol use: Never    Drug use: No    Sexual activity: Not Currently     Comment:    Other Topics Concern    None   Social History Narrative    Housing, household, and economic circumstances      Social Determinants of Health     Financial Resource Strain: Low Risk     Difficulty of Paying Living Expenses: Not hard at all   Food Insecurity: No Food Insecurity    Worried About Running Out of Food in the Last Year: Never true    Doris of Food in the Last Year: Never true   Transportation Needs: No Transportation Needs    Lack of Transportation (Medical): No    Lack of Transportation (Non-Medical):  No   Physical Activity: Not on file   Stress: Not on file   Social Connections: Not on file   Intimate Partner Violence: Not on file   Housing Stability: Low Risk     Unable to Pay for Housing in the Last Year: No    Number of Places Lived in the Last Year: 1    Unstable Housing in the Last Year: No        Family History:    Family History   Problem Relation Age of Onset    Diabetes Mother     Breast cancer Sister 48    No Known Problems Father     No Known Problems Maternal Grandmother     No Known Problems Maternal Grandfather     No Known Problems Paternal Grandmother     No Known Problems Paternal Grandfather     No Known Problems Daughter     No Known Problems Son     No Known Problems Sister     No Known Problems Sister     No Known Problems Brother     No Known Problems Brother     No Known Problems Sister     No Known Problems Paternal Aunt     No Known Problems Paternal Aunt     No Known Problems Paternal Aunt     No Known Problems Paternal Aunt        Medications:     Current Facility-Administered Medications:     acetaminophen (TYLENOL) tablet 650 mg, 650 mg, Oral, Q6H PRN, Solange Ardon MD, 650 mg at 08/25/22 0251    baclofen tablet 5 mg, 5 mg, Oral, TID, Karli Murillo MD, 5 mg at 08/25/22 1658    baclofen tablet 5 mg, 5 mg, Oral, HS PRN, Karli Murillo MD    bisacodyl (DULCOLAX) rectal suppository 10 mg, 10 mg, Rectal, Daily PRN, Solange Ardon MD    dabigatran etexilate (PRADAXA) capsule 150 mg, 150 mg, Oral, Q12H [address], Solange Ardon MD, 150 mg at 08/25/22 0825    famotidine (PEPCID) tablet 20 mg, 20 mg, Oral, Daily, Juan Jara MD, 20 mg at 08/25/22 1322    guaiFENesin (ROBITUSSIN) oral solution 200 mg, 200 mg, Oral, Q6H PRN, TOMY Weller    losartan (COZAAR) tablet 100 mg, 100 mg, Oral, Daily, Neelima Nina MD, 100 mg at 08/25/22 0825    magnesium oxide (MAG-OX) tablet 400 mg, 400 mg, Oral, Daily, Neelima Nina MD, 400 mg at 08/25/22 0825    menthol-methyl salicylate (BENGAY) 61-63 % cream, , Apply externally, BID, Juan Jara MD, 1 application at 00/15/20 1701    nebivolol (BYSTOLIC) tablet 5 mg, 5 mg, Oral, Daily, Neelima Nina MD, 5 mg at 08/25/22 0825    nitroglycerin (NITROSTAT) SL tablet 0 4 mg, 0 4 mg, Sublingual, Q5 Min PRN, TOMY Weller    polyethylene glycol (MIRALAX) packet 17 g, 17 g, Oral, Daily PRN, Neelima Nina MD    pravastatin (PRAVACHOL) tablet 40 mg, 40 mg, Oral, Daily With Summer Garibay MD, 40 mg at 08/25/22 1658    verapamil (CALAN-SR) CR tablet 240 mg, 240 mg, Oral, HS, Neelima Nina MD, 240 mg at 08/24/22 2123        OBSERVATIONS:     Appearance  __x__Neat ____Disheveled ____Overweight ____Underweight ____Frail    Speech  ____Fluid, Articulate ____Spontaneous ____Circumlocutions ____Word Finding difficulties ____Dysarthria ____Circumstantial __x__Paucity ____Perseverative ____Pressured ____ Tangential     Thought Process/Content  _x___Coherent  ____Preoccupations____Ruminations____Obsessions____Hallucinations ____Delusions ____Flight of Ideas ____Distortions ____Distracted ____Suicidal Ideation ____Homicidal Ideation ____ Memory Issues ____ Perseveration ____ Tangential    Interaction  __x__Engaged__x_Cooperative____Superficial____Detached____Fearful____Guarded____Suspicious ____Poor Jelly Kuster ____Aggressive    Affect  _x___Neutral__x__Positive____Anxiety____Worried____Irritable____Angry____Depressed/Dysphoric ____Euthymic _____ Janeen Davenport ____ Fatigued     Behavior  _x__Spontaneous ____Purposeful ____Slowed Initiation ____Apathetic ____Impulsive ____Dyscontrolled ____Hypoactive ____Hyperactive ____Repetitive/Perseverative      Overall Progress:    ____Very Declined ____Declined ____Stable _x___Improved ____Very Improved    Motivation and Participation:    ____Very Poor ____Poor ____Fair _x___Good ____Very Good                    ASSESSMENT & RECOMMENDATIONS:   Pt reports rehab went well today  She feels tired but reports it's a "good" kind of tired  Denies depression or anxiety  States she sleeps well  No problems with appetite  Provided supportive psychotherapy and mindfulness based interventions  Addressed coping, adjustment, and motivation  I will continue to evaluate pt's emotional functioning and status and provide supportive and mindfulness interventions during pt's stay  Effective coping strategies, distress tolerance, breathing exercises will be key interventions  Continued Psychological intervention is medically necessary to:  __x__ Maintain current progress  __X_   Achieve Therapy Goals   __X__Prevent relapse       DIAGNOSIS:  Adjustment Disorder with Mixed Emotional Features      Thank you for the opportunity to participate in pt's care  Lamont Travis, Ph D   Licensed Psychologist

## 2022-08-26 PROBLEM — R33.9 URINARY RETENTION: Status: ACTIVE | Noted: 2022-08-20

## 2022-08-26 PROBLEM — I69.352: Status: ACTIVE | Noted: 2022-08-19

## 2022-08-26 PROCEDURE — 99232 SBSQ HOSP IP/OBS MODERATE 35: CPT | Performed by: INTERNAL MEDICINE

## 2022-08-26 PROCEDURE — 99233 SBSQ HOSP IP/OBS HIGH 50: CPT | Performed by: PHYSICAL MEDICINE & REHABILITATION

## 2022-08-26 PROCEDURE — 97130 THER IVNTJ EA ADDL 15 MIN: CPT

## 2022-08-26 PROCEDURE — 97530 THERAPEUTIC ACTIVITIES: CPT

## 2022-08-26 PROCEDURE — 97112 NEUROMUSCULAR REEDUCATION: CPT

## 2022-08-26 PROCEDURE — 97110 THERAPEUTIC EXERCISES: CPT

## 2022-08-26 PROCEDURE — 97129 THER IVNTJ 1ST 15 MIN: CPT

## 2022-08-26 RX ORDER — LANOLIN ALCOHOL/MO/W.PET/CERES
3 CREAM (GRAM) TOPICAL EVERY EVENING
Status: DISCONTINUED | OUTPATIENT
Start: 2022-08-26 | End: 2022-09-04

## 2022-08-26 RX ADMIN — MAGNESIUM OXIDE TAB 400 MG (241.3 MG ELEMENTAL MG) 400 MG: 400 (241.3 MG) TAB at 08:12

## 2022-08-26 RX ADMIN — DABIGATRAN ETEXILATE MESYLATE 150 MG: 150 CAPSULE ORAL at 21:43

## 2022-08-26 RX ADMIN — PRAVASTATIN SODIUM 40 MG: 40 TABLET ORAL at 16:46

## 2022-08-26 RX ADMIN — MENTHOL, METHYL SALICYLATE: 10; 15 CREAM TOPICAL at 17:44

## 2022-08-26 RX ADMIN — DABIGATRAN ETEXILATE MESYLATE 150 MG: 150 CAPSULE ORAL at 08:12

## 2022-08-26 RX ADMIN — MENTHOL, METHYL SALICYLATE: 10; 15 CREAM TOPICAL at 08:15

## 2022-08-26 RX ADMIN — BACLOFEN 5 MG: 10 TABLET ORAL at 08:12

## 2022-08-26 RX ADMIN — Medication 3 MG: at 21:43

## 2022-08-26 RX ADMIN — NEBIVOLOL 5 MG: 5 TABLET ORAL at 08:12

## 2022-08-26 RX ADMIN — BACLOFEN 5 MG: 10 TABLET ORAL at 21:43

## 2022-08-26 RX ADMIN — FAMOTIDINE 20 MG: 20 TABLET, FILM COATED ORAL at 08:12

## 2022-08-26 RX ADMIN — VERAPAMIL HYDROCHLORIDE 240 MG: 240 TABLET ORAL at 21:44

## 2022-08-26 RX ADMIN — LOSARTAN POTASSIUM 100 MG: 50 TABLET, FILM COATED ORAL at 08:12

## 2022-08-26 RX ADMIN — BACLOFEN 5 MG: 10 TABLET ORAL at 16:46

## 2022-08-26 NOTE — PROGRESS NOTES
Internal Medicine Progress Note  Patient: Brook Lu  Age/sex: 80 y o  female  Medical Record #: [id number]      ASSESSMENT/PLAN: (Interval History)  Brook Lu is seen and examined and management for following issues:    Acute embolic right MCA CVA  · Had near occlusion of right MCA  · Felt to be a Xarelto failure and was switched to Pradaxa  · ECHO w/o thrombus  · Continue statin  · On Baclofen for tone left arm = improved     Chronic atrial fibrillation  · Sees Dr Carolyn Babinski as OP  · Rates controlled  · On Pradaxa now, Xarelto stopped     PPM  · VVI  · Is not MRI conditional     Valvular disease  · Current ECHO:  LVEF 65%, mod AR/mild AS, mod TR with severely elevated RV systolic pressure, mild-mod MR, mod LAE/mild NEO  · Euvolemic currently     HTN  · Home:  Verapamil 240mg qhs/Cozaar 398 mg qd/Bystolic 5mg qd/lasix 73PN qd/Aldactone 25mg qd  · Here:  Verapamil 240mg qhs/Cozaar 429AS qd/Bystolic 5mg qd  · OP note states has left RA stenosis but no testing in OP records or Care Everywhere to verify  · No changes again today     HLD  · Home:  Crestor 10mg qd = Neuro rec when discharged to reduce to 5mg qd  · Here:  Pravachol 40mg qd     DARRIUS  · S/p NS 1 liter   · Back to baseline  · Continue to hold Lasix and Aldactone for now     CAD  · Nonobstructive  · Card cath 8/2021 done for borderline abn stress test and found 50% mid RCA/40% mid LAD = no intervention done  · Continue statin  · Was not on ASA as an OP     Hypomagnesemia  · Takes here and at home 400 mg MagOx  · Mg level 8/20 was 2 4     Left chest pain  · Had left neck pain (not new for her) then pain under left breast = reproducible with palpation  · EKG was 100% v-paced but no obvious acute changes; trops negative Bengay applied  · At the time, felt likely M/S from spasm  She then had worsening pain in chest and described pressure so RR called    · Trops = 29/-, 28/-1, 28/-1  · Etio is her spasticity and Dr Bipin Suresh inc Baclofen to 5mg TID and 5mg qhs prn which has helped but she is still having some left neck discomfort     Cough  · Have not witnessed her coughing but she endorses  · Has some clear-whitish secretions  · Denies post nasal drip  · CXR negative 8/25  · Dr Sara Biswas added Pepcid in case etio of cough is 2/2 GERD  · Has prn Robitussin which she took last night 8/25/22         DC planning: Reteam       The above assessment and plan was reviewed and updated as determined by my evaluation of the patient on 8/26/2022      Labs:   Results from last 7 days   Lab Units 08/25/22  0452 08/22/22  0458   WBC Thousand/uL 8 30 7 33   HEMOGLOBIN g/dL 11 6 12 0   HEMATOCRIT % 37 9 38 6   PLATELETS Thousands/uL 386 360     Results from last 7 days   Lab Units 08/25/22  0452 08/22/22  0458   SODIUM mmol/L 134* 134*   POTASSIUM mmol/L 4 5 4 7   CHLORIDE mmol/L 106 108   CO2 mmol/L 24 22   BUN mg/dL 32* 31*   CREATININE mg/dL 1 05 1 06   CALCIUM mg/dL 9 3 9 3                   Review of Scheduled Meds:  Current Facility-Administered Medications   Medication Dose Route Frequency Provider Last Rate    acetaminophen  650 mg Oral Q6H PRN Mónica Merino MD      baclofen  5 mg Oral TID Chris Manriquez MD      baclofen  5 mg Oral HS PRN Chris Manriquez MD      bisacodyl  10 mg Rectal Daily PRN Mónica Merino MD      dabigatran etexilate  150 mg Oral Q12H [facility] Mónica Merino MD      famotidine  20 mg Oral Daily Chris Manriquez MD      guaiFENesin  200 mg Oral Q6H PRN TOMY Rojas      losartan  100 mg Oral Daily Mónica Merino MD      magnesium oxide  400 mg Oral Daily Mónica Merino MD      menthol-methyl salicylate   Apply externally BID Chris Manirquez MD      nebivolol  5 mg Oral Daily Mónica Merino MD      nitroglycerin  0 4 mg Sublingual Q5 Min PRN TOMY Suarez      polyethylene glycol  17 g Oral Daily PRN Mónica Merino MD      pravastatin  40 mg Oral Daily With Kae Odell MD      verapamil  240 mg Oral HS [facility] Sophia Willett MD         Subjective/ HPI: Patient seen and examined  Patients overnight issues or events were reviewed with nursing or staff during rounds or morning huddle session  New or overnight issues include the following:     No new or overnight issues  Offers no complaints    ROS:   A 10 point ROS was performed; negative except as noted above  Imaging:     XR chest portable   Final Result by Edwin Meier MD ([id number])      No acute cardiopulmonary disease  Workstation performed: [id number]             *Labs /Radiology studies reviewed  *Medications reviewed and reconciled as needed  *Please refer to order section for additional ordered labs studies  *Case discussed with primary attending during morning huddle case rounds    Physical Examination:  Vitals:   Vitals:    08/25/22 0718 08/25/22 0823 08/25/22 2116 08/26/22 0623   BP: 143/66 129/62 119/91 (!) 107/46   BP Location:  Right arm Right arm Right arm   Pulse: 67 62 63 60   Resp:  18 18 17   Temp:  97 7 °F (36 5 °C) 98 6 °F (37 °C) (!) 97 4 °F (36 3 °C)   TempSrc:  Oral Oral Oral   SpO2: 98%  95% 100%   Weight:       Height:           General Appearance: no distress, conversive  HEENT: PERRLA, conjuctiva normal; oropharynx clear; mucous membranes moist   Neck:  Supple, normal ROM  Lungs: CTA, normal respiratory effort, no retractions, expiratory effort normal  CV: regular rate and rhythm; no rubs/murmurs/gallops, PMI normal   ABD: soft; ND/NT; +BS  EXT: no edema  Skin: normal turgor, normal texture, no rashes  Psych: affect normal, mood normal  Neuro: AAO      The above physical exam was reviewed and updated as determined by my evaluation of the patient on 8/26/2022      Invasive Devices  Report    Drain  Duration           External Urinary Catheter 2 days                   VTE Pharmacologic Prophylaxis: Pradaxa  Code Status: Level 1 - Full Code  Current Length of Stay: 7 day(s)      Total time spent:  30 minutes with more than 50% spent counseling/coordinating care  Counseling includes discussion with patient re: progress  and discussion with patient of his/her current medical state/information  Coordination of patient's care was performed in conjunction with primary service  Time invested included review of patient's labs, vitals, and management of their comorbidities with continued monitoring  In addition, this patient was discussed with medical team including physician and advanced extenders  The care of the patient was extensively discussed and appropriate treatment plan was formulated unique for this patient  ** Please Note:  voice to text software may have been used in the creation of this document   Although proof errors in transcription or interpretation are a potential of such software**

## 2022-08-26 NOTE — PLAN OF CARE
Problem: MOBILITY - ADULT  Goal: Maintain or return to baseline ADL function  Description: INTERVENTIONS:  -  Assess patient's ability to carry out ADLs; assess patient's baseline for ADL function and identify physical deficits which impact ability to perform ADLs (bathing, care of mouth/teeth, toileting, grooming, dressing, etc )  - Assess/evaluate cause of self-care deficits   - Assess range of motion  - Assess patient's mobility; develop plan if impaired  - Assess patient's need for assistive devices and provide as appropriate  - Encourage maximum independence but intervene and supervise when necessary  - Involve family in performance of ADLs  - Assess for home care needs following discharge   - Consider OT consult to assist with ADL evaluation and planning for discharge  - Provide patient education as appropriate  Outcome: Progressing  Goal: Maintains/Returns to pre admission functional level  Description: INTERVENTIONS:  - Set and communicate daily mobility goal to care team and patient/family/caregiver  - Collaborate with rehabilitation services on mobility goals if consulted  - Perform Range of Motion 3 times a day  - Reposition patient every 2 hours    - Dangle patient 3 times a day  - Stand patient 3 times a day  - Ambulate patient 3 times a day  - Out of bed to chair 3 times a day   - Out of bed for meals 3 times a day  - Out of bed for toileting  - Record patient progress and toleration of activity level   Outcome: Progressing     Problem: Prexisting or High Potential for Compromised Skin Integrity  Goal: Skin integrity is maintained or improved  Description: INTERVENTIONS:  - Identify patients at risk for skin breakdown  - Assess and monitor skin integrity  - Assess and monitor nutrition and hydration status  - Monitor labs   - Assess for incontinence   - Turn and reposition patient  - Assist with mobility/ambulation  - Relieve pressure over bony prominences  - Avoid friction and shearing  - Provide appropriate hygiene as needed including keeping skin clean and dry  - Evaluate need for skin moisturizer/barrier cream  - Collaborate with interdisciplinary team   - Patient/family teaching  - Consider wound care consult   Outcome: Progressing     Problem: Potential for Falls  Goal: Patient will remain free of falls  Description: INTERVENTIONS:  - Educate patient/family on patient safety including physical limitations  - Instruct patient to call for assistance with activity   - Consult OT/PT to assist with strengthening/mobility   - Keep Call bell within reach  - Keep bed low and locked with side rails adjusted as appropriate  - Keep care items and personal belongings within reach  - Initiate and maintain comfort rounds  - Make Fall Risk Sign visible to staff  - Offer Toileting every 2-4 Hours, in advance of need  - Initiate/Maintain bed/chair alarm  - Obtain necessary fall risk management equipment: nonskid footwear  - Apply yellow socks and bracelet for high fall risk patients  - Consider moving patient to room near nurses station  Outcome: Progressing     Problem: PAIN - ADULT  Goal: Verbalizes/displays adequate comfort level or baseline comfort level  Description: Interventions:  - Encourage patient to monitor pain and request assistance  - Assess pain using appropriate pain scale  - Administer analgesics based on type and severity of pain and evaluate response  - Implement non-pharmacological measures as appropriate and evaluate response  - Consider cultural and social influences on pain and pain management  - Notify physician/advanced practitioner if interventions unsuccessful or patient reports new pain  Outcome: Progressing     Problem: INFECTION - ADULT  Goal: Absence or prevention of progression during hospitalization  Description: INTERVENTIONS:  - Assess and monitor for signs and symptoms of infection  - Monitor lab/diagnostic results  - Monitor all insertion sites, i e  indwelling lines, tubes, and drains  - Monitor endotracheal if appropriate and nasal secretions for changes in amount and color  - Adams appropriate cooling/warming therapies per order  - Administer medications as ordered  - Instruct and encourage patient and family to use good hand hygiene technique  - Identify and instruct in appropriate isolation precautions for identified infection/condition  Outcome: Progressing     Problem: DISCHARGE PLANNING  Goal: Discharge to home or other facility with appropriate resources  Description: INTERVENTIONS:  - Identify barriers to discharge w/patient and caregiver  - Arrange for needed discharge resources and transportation as appropriate  - Identify discharge learning needs (meds, wound care, etc )  - Arrange for interpretive services to assist at discharge as needed  - Refer to Case Management Department for coordinating discharge planning if the patient needs post-hospital services based on physician/advanced practitioner order or complex needs related to functional status, cognitive ability, or social support system  Outcome: Progressing

## 2022-08-26 NOTE — PROGRESS NOTES
Physical Medicine and Rehabilitation Progress Note  Rodrigo Ch 80 y o  female MRN: [id number]  Unit/Bed#: -50 Encounter: [id number]    HPI: 59-year-old female with a past medical history of AFib recently on Xarelto, sick sinus syndrome status status post pacemaker placement that is not MRI compatible, hypertension, hyperlipidemia, valvular disease, coronary artery disease developed dense left-sided weakness, dysarthria and presented to the hospital on 08/12/2022  She had systolic blood pressure of 200 on arrival   CT head showed focal area of gliosis within the high right post central gyrus and scattered chronic microvascular ischemic changes with more focal lucency along the anterior limb of the right internal capsule  CTA of head and neck showed near occlusive thrombus at the right MCA bifurcation, mild beating of the mid to distal ICA suspicious mild fibromuscular dysplasia and mild atherosclerotic disease at the bilateral distal carotid arteries  She was not a candidate for MRI due to pacemaker  CT cerebral perfusion study showed 8 mL of mismatch  She was not a candidate for tPA due to bleeding risk from being on Xarelto  She was not a thrombectomy candidate for neurointerventional   Patient was recommended to transition to Pradaxa  Patient was evaluated by skilled therapies and was found to have significant decline in ADLs and ambulation and appears appropriate for admission to [place] [id number]  Chief Complaint: No new issues  Interval History/Subjective:  No acute events overnight  Chest pain has resolved  Continues to have tightness, clonus, and flexion synergy in L arm, but AROM has improved (primarily in flexion and scaption)  She denies any N/V, abdominal pain  Denies any fevers, chills  Continues to have a persistent cough with clear white mucus  She denies any post nasal drip   Family notes she has a history of cough with Lisinopril, but only on Losartan here  Patient reports poor sleep due to her shoulder discomfort  ROS:  A 10 point review of systems was negative except for what is noted in the HPI  Today's Changes:  1  Continue Baclofen to 5mg TID with an extra 5mg QHS if needed  - Discussed botox with patient and family, they are open to trying it while she is inpatient   - Reached out to [facility] with Codie to see if we can arrange for a sample to be brought to the hospital  Discussed with medical director of unit  2  Unclear etiology of cough  On pepcid, CXR unremarkable, WBC WNL  - Will have SLP do bedside swallow eval     - PRN guaifenesin added by IM  Can also try tessalon  3  Now retaining urine  Q6hr protocol reviewed with nursing   4  Met with family including son and his wife to update and review plans  5  Melatonin for sleep  Total time spent:  35 minutes, with more than 50% spent counseling/coordinating care  Counseling includes discussion with patient re: progress in therapies, functional issues observed by therapy staff, and discussion with patient his/her current medical state/wellbeing  Coordination of patient's care was performed in conjunction with Internal Medicine service to monitor patient's labs, vitals, and management of their comorbidities  Assessment/Plan:    * Arterial ischemic stroke, MCA (middle cerebral artery), right, acute (HCC)  Assessment & Plan  - R MCA CVA   - CTA head/neck - near occlusive thrombus at the R MCA bifurcation, mild beading of the mid to distal ICAs suspicious for mild fibromuscular dysplasia, and mild atherosclerotic disease at bilateral distal carotid arteries     - no MRI because pacemaker not compatible    - Residual impairments on admission to [facility]: L spastic hemiparesis, bowel/bladder dysfunction, possible cog impairments (assess for other impairments during ARC course)     Secondary stroke prevention  - repeat CT angio head and neck in 3 months to re-evaluate thrombus is a outpatient  - Antithrombotic: Pradaxa (switched from Xarelto for possible failure)   - Statin  - Optimal management of blood pressure and diabetes  - Patient at increased risk for stroke particularly early in post-stroke period - monitor neuro exam closely with low threshold to repeat imaging  -  patient and if applicable caregiver on optimal stroke management  - Follow-up with neurology and PCP after d/c   - Recommend acute comprehensive interdisciplinary inpatient rehabilitation to include intensive skilled therapies (PT, OT, ST) as outlined with oversight and management by rehabilitation physician as well as inpatient rehab level nursing, case management and weekly interdisciplinary team meetings  - patient does have fair left hand and wrist strength; can use resting hand splint for a few hours during the day and overnight but to not overuse and encourage use hand and wrist  - multi Podus boot on left foot patient can breaks to potentially decrease risk of contracture/skin breakdown   - patient has spastic left upper extremity making subluxation less likely although continue subluxation precautions      At risk for coping difficulty  Assessment & Plan  Supportive counseling  Psychology consult while in [facility]  Discharged on magnesium daily   Continue for now  Monitor level  Neck pain  Assessment & Plan  With taut muscle bands in left cervical paraspinal and trapezius areas  Gentle stretching  Baclofen 5 mg TID with extra QHS dose prn  Monitor closely given GFR  PRN APAP  Consider K-pad or other topicals    Urinary retention  Assessment & Plan  Was using a purewick  On 8/25 was noted not to have any urine output overnight by nursing    - Bladder scan was >500cc  Ultimately catheterized for 1600cc     - UA negative   - Starting Q6hr scan/cath program    - monitor for retention, incontinence, signs/symptoms of UTI  - ensure optimal bowel management   - recommend toileting program Q2-4 H during day and Q4-6H overnight   - bladder scan Q6H, monitor output and PVRs, straight cath >450 or if uncomfortable 250-449      CAD (coronary artery disease)  Assessment & Plan  IM consulted and with overall management at their discretion during ARC course  Antithrombotic: Pradaxa  Statin  Optimal blood pressure   Outpatient Cardiology follow-up    Per IM  · "Nonobstructive  · Card cath 8/2021 done for borderline abn stress test and found 50% mid RCA/40% mid LAD = no intervention done  · Continue statin  · Was not on ASA as an OP"       Valvular heart disease  Assessment & Plan  Per IM  · "Current ECHO:  LVEF 65%, mod AR/mild AS, mod TR with severely elevated RV systolic pressure, mild-mod MR, mod LAE/mild NEO  · Euvolemic currently but will watch since CTA on admission showed pulm edema and b/l pleural effusions"  -diuretics at their discretion   -Outpatient cardiology follow-up    Spastic hemiparesis of left dominant side as late effect of cerebral infarction [facility])  Assessment & Plan  Left upper extremity significantly (also some L neck tightness/spasms)  Modified Sada scale 3 in shoulder, and 2 in her elbow  - Has flexion synergy    - Has clonus in pronators and wrist flexors  Baclofen 5mg TID with extra dose QHS PRN  Monitor given GFR  May be more sleepy initially  Reached out to [employer] rep to see if we can get samples for inpatient administration of botulinum toxin   - 75 units lateral pec, 75 units biceps, 25 units FCR, 25 units pronator teres for total 200 units     Gentle range of motion with therapy  Patient does have movement of the hand and wrist and do want to encourage that - can still consider resting hand splint intermittently  Skilled PT OT  Optimal skin hygiene    DARRIUS (acute kidney injury) [facility])  Assessment & Plan  Resolved  8/25 Crea 1 05  Medicine consulted  Hold Lasix and Aldactone  Resume diuretics at discretion of medicine  Monitor labs and output    At risk for venous thromboembolism (VTE)  Assessment & Plan  SCDs, ambulation, and Pradaxa       Pacemaker  Assessment & Plan  Not MRI compatible    Chronic atrial fibrillation (HCC)  Assessment & Plan  IM consulted and with overall management at their discretion during ARC course  Rate control: Verapamil, Nebivolol   Antithrombotic: Pradaxa       Stage 3 chronic kidney disease [facility])  Assessment & Plan  Lab Results   Component Value Date    EGFR 49 08/25/2022    EGFR 49 08/22/2022    EGFR 47 08/21/2022    CREATININE 1 05 08/25/2022    CREATININE 1 06 08/22/2022    CREATININE 1 09 08/21/2022     Given IVF  Crea now back down to ~1 06 near baseline  Avoid nephrotoxic meds  Monitor voiding status  IM consulted  Hyperlipidemia LDL goal <100  Assessment & Plan  Statin    Essential hypertension  Assessment & Plan  Internal medicine consulted and co-management with their service  Monitor vitals with and without activity; monitor for orthostasis  Monitor hemoglobin, electrolytes, kidney function, hydration status   Current meds:  Losartan, nebivolol, Verapamil 240mg daily  - Lasix and Aldactone on hold      Other chest pain  Assessment & Plan  8/25 with chest pain, ultimately felt to be 2/2 to spasticity  Improved with baclofen/robaxin  - delta hsTrop at 2 hr was -1    - EKG showed paced rhythm, no evidence of ischemia   - CXR unremarkable   - 30 min later had another episode that felt more like pressure  Rapid called, but not concerning for ACS  Generally reproducible   - Increased Baclofen and frequency of Johnie Healy  Has a cough with it, and lungs sound good, and CXR unremarkable  - May also be component of GERD  Started on pepcid  - Having SLP evaluate swallow for cough      Health Maintenance  #Delirium/Sleep: At risk  Environmental interventions  Optimize pain, bowel, bladder, sleep-wake cycle management  #Pain: Tylenol PRN  #Bowel: Last BM 8/25 and continent   Not on regimen - only PRNs  #Skin/Pressure Injury Prevention: Turn Q2hr in bed, with weight shifts [id number] in wheelchair  #DVT Prophylaxis: Fully anticoagulated on pradaxa  #GI Prophylaxis: PO diet   #Code Status: Full Code  #FEN: Reg/Thins  #Dispo: Team Conference 8/23: ELOS 3-4 weeks  Plan to reteam      Objective:    Functional Update: making good progress  PT: maxA transfers, mod-maxA bed mobility, totalA x2 ambulation 30', modA wheelchair mobility 150'   OT: Sup eating, maxA grooming, Ax2 bathing, maxA UB dressing, Ax2 LB dressing, Ax2 toileting and toilet transfers  SLP: mild cognitive impairment  Allergies per EMR    Physical Exam:  Temp:  [97 4 °F (36 3 °C)-98 6 °F (37 °C)] 97 4 °F (36 3 °C)  HR:  [60-63] 60  Resp:  [17-18] 17  BP: (107-119)/(46-91) 107/46  Oxygen Therapy  SpO2: 100 %    Gen: No acute distress, Well-nourished, well-appearing  HEENT: Moist mucus membranes, Normocephalic/Atraumatic  Cardiovascular: Regular rate, rhythm, S1/S2  Distal pulses palpable  Heme/Extr: No edema  Pulmonary: Non-labored breathing  : No contreras  GI: Soft, non-tender, non-distended  BS+  MSK: Improved AROM in shoulder scaption and flexion  Still limited in shoulder abduction  Tone is mild and rangeable, affecting shoulder adduction, elbow flexion, wrist flexion, and pronation  Integumentary: Skin is warm, dry  Neuro: AAOx3,  Speech is intact  Appropriate to questioning  L spasticity improved  Has focal clonus of pronator teres on the left and wrist flexors  Also with flexion synergy pattern in her L arm as she ambulates or gets distracted  Psych: Normal mood and affect  Diagnostic Studies: Reviewed, no new imaging      Laboratory:  Reviewde   Results from last 7 days   Lab Units 08/25/22  0452 08/22/22  0458   HEMOGLOBIN g/dL 11 6 12 0   HEMATOCRIT % 37 9 38 6   WBC Thousand/uL 8 30 7 33     Results from last 7 days   Lab Units 08/25/22  0452 08/22/22  0458 08/21/22  0434   BUN mg/dL 32* 31* 33*   POTASSIUM mmol/L 4 5 4 7 4 8   CHLORIDE mmol/L 106 108 108 CREATININE mg/dL 1 05 1 06 1 09            Patient Active Problem List   Diagnosis    Other chest pain    Essential hypertension    Hyperlipidemia LDL goal <100    Stage 3 chronic kidney disease (HCC)    Osteoarthritis of both wrists    Chronic atrial fibrillation (HCC)    Tubulovillous adenoma    Gastroesophageal reflux disease without esophagitis    Pacemaker    B12 deficiency    Allergic rhinitis due to pollen    Cavus deformity of foot    Midline cystocele    Hallux abducto valgus, bilateral    Left renal artery stenosis (HCC)    Osteoarthritis of hip    Osteopenia    Pain due to onychomycosis of toenails of both feet    Left atrial enlargement    Lipoma of right upper extremity    Diverticulosis of colon    Abnormal nuclear stress test    Renal insufficiency    Arterial ischemic stroke, MCA (middle cerebral artery), right, acute (HCC)    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)    Anemia    At risk for venous thromboembolism (VTE)    DARRIUS (acute kidney injury) ([facility] [address] )    Muscle spasticity    Valvular heart disease    CAD (coronary artery disease)    At risk for altered urinary elimination    Neck pain    Healthcare maintenance    At risk for coping difficulty         Medications  Current Facility-Administered Medications   Medication Dose Route Frequency Provider Last Rate    acetaminophen  650 mg Oral Q6H PRN Radha Mendoza MD      baclofen  5 mg Oral TID Vinay Lake MD      baclofen  5 mg Oral HS PRN Vinay Lake MD      bisacodyl  10 mg Rectal Daily PRN Radha Mendoza MD      dabigatran etexilate  150 mg Oral Q12H [facility] Radha Mendoza MD      famotidine  20 mg Oral Daily Vinay Lake MD      guaiFENesin  200 mg Oral Q6H PRN TOMY Zavala      losartan  100 mg Oral Daily Radha Mendoza MD      magnesium oxide  400 mg Oral Daily Radha Mendoza MD      menthol-methyl salicylate   Apply externally BID Vinay Lake MD  nebivolol  5 mg Oral Daily Cristina Briggs MD      nitroglycerin  0 4 mg Sublingual Q5 Min PRN TOMY Levine      polyethylene glycol  17 g Oral Daily PRN Cristina Briggs MD      pravastatin  40 mg Oral Daily With Silvia Griffith MD      verapamil  240 mg Oral HS Cristina Briggs MD            ** Please Note: Fluency Direct voice to text software may have been used in the creation of this document   **

## 2022-08-26 NOTE — PROGRESS NOTES
08/26/22 0930   Pain Assessment   Pain Assessment Tool 0-10   Pain Score 3   Pain Location/Orientation Orientation: Left; Location: Neck  (upper trap)   Effect of Pain on Daily Activities gentle STM 5' performed to L upper trap this session for pain relief which pt tolerated well, states pain post is 3/10   Restrictions/Precautions   Precautions Bed/chair alarms;Cognitive; Fall Risk;Supervision on toilet/commode;Pain   Weight Bearing Restrictions No   ROM Restrictions No   Braces or Orthoses Splint;AFO  (resting hand splint LUE donned for 20 mins during session as pt reporting she cannot tolerate HS wear; tolerated during session, left on and alerted OT Valerie of pt complaints; L AFO donned start of session w/ sneakers)   Cognition   Overall Cognitive Status Impaired   Arousal/Participation Alert; Cooperative   Attention Attends with cues to redirect   Memory Decreased short term memory   Following Commands Follows one step commands with increased time or repetition   Subjective   Subjective "Today is not good " Pt reports difficulty sleeping and getting comfortable  Reports pain in upper trap ongoing, agreeable to STM     Lying to Sitting on Side of Bed   Type of Assistance Needed Physical assistance   Physical Assistance Level 51%-75%   Comment modAx1 for LLE management and trunk assist   Lying to Sitting on Side of Bed CARE Score 2   Sit to Stand   Type of Assistance Needed Physical assistance   Physical Assistance Level 51%-75%   Comment modAx1 with therapist in front of pt focusing on driving feet through ground, coming to upright posture with minimal RUE use  had pt place arm around therapist as when pt pushing from chair goes into extensor posture and retropulsive; performed 3x5, during last set increase in A to complete due to fatigue   Sit to Stand CARE Score 2   Bed-Chair Transfer   Type of Assistance Needed Physical assistance   Physical Assistance Level 76% or more   Comment maxAx1 sit pivot transfer Chair/Bed-to-Chair Transfer CARE Score 2   Car Transfer   Reason if not Attempted Environmental limitations   Car Transfer CARE Score 10   Ambulation   Findings not focus of this session, will perform during p m  session   Wheelchair mobility   Findings not focus of this session   4 Steps   Reason if not Attempted Activity not applicable   4 Steps CARE Score 9   12 Steps   Reason if not Attempted Activity not applicable   12 Steps CARE Score 9   Picking Up Object   Reason if not Attempted Safety concerns   Picking Up Object CARE Score 88   Therapeutic Interventions   Strengthening 3x5 STS with PT anterior to pt, see STS section for details   Other STM 5' to L upper trap for pain relief and to improve mm length as tightness noted   Other Comments   Comments L ANGELA marinelli and danilo start of session   Assessment   Treatment Assessment 30 min skilled PT session focusing on transfer trng, STS transfers for strengthening and NMR, and STM to L upper trap for pain relief and to improve mm length  Pt reports fatigue and states that she did not sleep well last night but still agreeable to skilled PT session  Pt reports ongoing L upper trap pain, with STM pain decreased from 10/10 to 3/10 per pt report  Focused on STS transfers with cues for pt to "push LEs through floor" as if pt not cued pt reliant on pulling up with RUE on therapist  Requires modAx1 to complete, with fatigue requires slight increase in A, also focused on improving anterior weight shift with STS as if pt pushes from chair pt retropulsive  Donned RHS to LUE during session as pt tightly gripping small towel roll  Educated pt on importance of trialing to wear, pt reports pain when wearing for prolonged periods of time, alerted OT Valerie end of session   At this time pt would cont to benefit from skilled PT intervention with POC focusing on NPP interventions to improve pt's balance, coordination, motor planning and righting reactions, LE strengthening interventions to improve LLE strength, core strengthening, transfer trng, and improving pt's activity tolerance  Problem List Decreased strength;Decreased range of motion;Decreased endurance; Impaired balance;Decreased mobility; Decreased cognition;Decreased coordination; Impaired judgement;Decreased safety awareness; Impaired tone;Pain   Barriers to Discharge Inaccessible home environment;Decreased caregiver support   PT Barriers   Functional Limitation Car transfers; Ramp negotiation;Stair negotiation;Transfers;Standing;Walking; Wheelchair management   Plan   Treatment/Interventions Functional transfer training;LE strengthening/ROM; Elevations; Therapeutic exercise; Endurance training;Patient/family training;Bed mobility;Gait training   Progress Progressing toward goals   Recommendation   PT Discharge Recommendation   (tbd)   PT Therapy Minutes   PT Time In 0930   PT Time Out 1000   PT Total Time (minutes) 30   PT Mode of treatment - Individual (minutes) 30   PT Mode of treatment - Concurrent (minutes) 0   PT Mode of treatment - Group (minutes) 0   PT Mode of treatment - Co-treat (minutes) 0   PT Mode of Treatment - Total time(minutes) 30 minutes   PT Cumulative Minutes 540   Therapy Time missed   Time missed?  No

## 2022-08-26 NOTE — PLAN OF CARE
Problem: MOBILITY - ADULT  Goal: Maintain or return to baseline ADL function  Description: INTERVENTIONS:  -  Assess patient's ability to carry out ADLs; assess patient's baseline for ADL function and identify physical deficits which impact ability to perform ADLs (bathing, care of mouth/teeth, toileting, grooming, dressing, etc )  - Assess/evaluate cause of self-care deficits   - Assess range of motion  - Assess patient's mobility; develop plan if impaired  - Assess patient's need for assistive devices and provide as appropriate  - Encourage maximum independence but intervene and supervise when necessary  - Involve family in performance of ADLs  - Assess for home care needs following discharge   - Consider OT consult to assist with ADL evaluation and planning for discharge  - Provide patient education as appropriate  Outcome: Progressing  Goal: Maintains/Returns to pre admission functional level  Description: INTERVENTIONS:  - Set and communicate daily mobility goal to care team and patient/family/caregiver  - Collaborate with rehabilitation services on mobility goals if consulted  - Perform Range of Motion 3 times a day  - Reposition patient every 2 hours    - Dangle patient 3 times a day  - Stand patient 3 times a day  - Ambulate patient 3 times a day  - Out of bed to chair 3 times a day   - Out of bed for meals 3 times a day  - Out of bed for toileting  - Record patient progress and toleration of activity level   Outcome: Progressing     Problem: Prexisting or High Potential for Compromised Skin Integrity  Goal: Skin integrity is maintained or improved  Description: INTERVENTIONS:  - Identify patients at risk for skin breakdown  - Assess and monitor skin integrity  - Assess and monitor nutrition and hydration status  - Monitor labs   - Assess for incontinence   - Turn and reposition patient  - Assist with mobility/ambulation  - Relieve pressure over bony prominences  - Avoid friction and shearing  - Provide appropriate hygiene as needed including keeping skin clean and dry  - Evaluate need for skin moisturizer/barrier cream  - Collaborate with interdisciplinary team   - Patient/family teaching  - Consider wound care consult   Outcome: Progressing     Problem: Potential for Falls  Goal: Patient will remain free of falls  Description: INTERVENTIONS:  - Educate patient/family on patient safety including physical limitations  - Instruct patient to call for assistance with activity   - Consult OT/PT to assist with strengthening/mobility   - Keep Call bell within reach  - Keep bed low and locked with side rails adjusted as appropriate  - Keep care items and personal belongings within reach  - Initiate and maintain comfort rounds  - Make Fall Risk Sign visible to staff  - Offer Toileting every 2-4 Hours, in advance of need  - Initiate/Maintain bed/chair alarm  - Obtain necessary fall risk management equipment: nonskid footwear  - Apply yellow socks and bracelet for high fall risk patients  - Consider moving patient to room near nurses station  Outcome: Progressing     Problem: PAIN - ADULT  Goal: Verbalizes/displays adequate comfort level or baseline comfort level  Description: Interventions:  - Encourage patient to monitor pain and request assistance  - Assess pain using appropriate pain scale  - Administer analgesics based on type and severity of pain and evaluate response  - Implement non-pharmacological measures as appropriate and evaluate response  - Consider cultural and social influences on pain and pain management  - Notify physician/advanced practitioner if interventions unsuccessful or patient reports new pain  Outcome: Progressing     Problem: INFECTION - ADULT  Goal: Absence or prevention of progression during hospitalization  Description: INTERVENTIONS:  - Assess and monitor for signs and symptoms of infection  - Monitor lab/diagnostic results  - Monitor all insertion sites, i e  indwelling lines, tubes, and drains  - Monitor endotracheal if appropriate and nasal secretions for changes in amount and color  - [place] appropriate cooling/warming therapies per order  - Administer medications as ordered  - Instruct and encourage patient and family to use good hand hygiene technique  - Identify and instruct in appropriate isolation precautions for identified infection/condition  Outcome: Progressing     Problem: DISCHARGE PLANNING  Goal: Discharge to home or other facility with appropriate resources  Description: INTERVENTIONS:  - Identify barriers to discharge w/patient and caregiver  - Arrange for needed discharge resources and transportation as appropriate  - Identify discharge learning needs (meds, wound care, etc )  - Arrange for interpretive services to assist at discharge as needed  - Refer to Case Management Department for coordinating discharge planning if the patient needs post-hospital services based on physician/advanced practitioner order or complex needs related to functional status, cognitive ability, or social support system  Outcome: Progressing

## 2022-08-26 NOTE — PROGRESS NOTES
08/26/22 1415   Pain Assessment   Pain Assessment Tool 0-10   Restrictions/Precautions   Precautions Bed/chair alarms;Supervision on toilet/commode;Aspiration; Fall Risk;Cognitive   Subjective   Subjective no complaints, just tired at end of session   Sit to Lying   Type of Assistance Needed Physical assistance;Verbal cues   Physical Assistance Level 51%-75%   Comment Pt assisting with R UE/LE, poor coordination for trunk rotation and unable to lift L LE   Sit to Lying CARE Score 2   Lying to Sitting on Side of Bed   Type of Assistance Needed Physical assistance;Verbal cues   Physical Assistance Level 51%-75%   Comment Pt assiting with R UE/LE, provided A for L LE and scooting to EOB to get fet on floor   Lying to Sitting on Side of Bed CARE Score 2   Sit to Stand   Type of Assistance Needed Physical assistance;Verbal cues   Physical Assistance Level 26%-50%   Comment cueing for proper useo f R UE to push up from chair  Therapist providing stabilization at left knee  Sit to Stand CARE Score 3   Bed-Chair Transfer   Type of Assistance Needed Physical assistance   Physical Assistance Level 76% or more   Comment SPT performed, poor coordination to complete turn, but pt able to assist with stand   Chair/Bed-to-Chair Transfer CARE Score 2   Ambulation   Distance Walked (feet) 30 ft  (x3)   Assist Device   (right long HR in [place])   Gait Pattern Improper weight shift;Trendelenburg;L hemiparesis; Decreased foot clearance;L knee charles   Limitations Noted In Strength;Swing;Midline Orientation; Heel Strike;Device Management; Coordination;Balance   Findings worked along HR in [place], using wall on right as cue for proper wt shift when advancing L LE  Continued with AFO and also required physical assist for swing, Left knee flexes but does not buckle  Therapist A at left hip/waist for weight shift to right  Pt using R HR only for UE support     Wheel 50 Feet with Two Turns   Type of Assistance Needed Physical assistance;Verbal cues   Physical Assistance Level 26%-50%   Comment cueing throughout and demo   Wheel 50 Feet with Two Turns CARE Score 3   Wheel 150 Feet   Type of Assistance Needed Physical assistance;Verbal cues   Physical Assistance Level 51%-75%   Wheel 150 Feet CARE Score 2   Wheelchair mobility   Does the patient use a wheelchair? 1  Yes   Type of Wheelchair Used 1  Manual   Method Right upper extremity;Right lower extremity   Distance Level Surface (feet) 150 ft   Therapeutic Interventions   Flexibility Passive stretch Left calf & HS along with passive PNF patterns prior to getting up   Equipment Use   NuStep 5 min using all extremities and therapist assiting with L UE extension and continuous movement   Assessment   Treatment Assessment Pt seen to continue to work on functional mobility and NPP  Nustep to start session for nueral priming with forced continuous movement  Pt benefited from having wall on right side as cue for weight shift when working on gait  Able to achieve step through gait with A of L LE for swing and wt shift  Reflexive grasp and flexor syngery noted when pt holding anything left hand  Had pt keep left hand/arm at her side and only use R HR  Recommendation   PT Discharge Recommendation   (TBD based on progress made)   PT Therapy Minutes   PT Time In 1415   PT Time Out 1500   PT Total Time (minutes) 45   PT Mode of treatment - Individual (minutes) 45   PT Mode of treatment - Concurrent (minutes) 0   PT Mode of treatment - Group (minutes) 0   PT Mode of treatment - Co-treat (minutes) 0   PT Mode of Treatment - Total time(minutes) 45 minutes   PT Cumulative Minutes 585   Therapy Time missed   Time missed?  No

## 2022-08-26 NOTE — PROGRESS NOTES
08/26/22 9680   Pain Assessment   Pain Assessment Tool 0-10   Pain Score 2   Pain Location/Orientation Orientation: Left; Location: Neck   Hospital Pain Intervention(s) Repositioned   Restrictions/Precautions   Precautions Bed/chair alarms;Cognitive; Fall Risk;Pain;Supervision on toilet/commode   Comprehension   Comprehension (FIM) 5 - Understands basic directions and conversation   Expression   Expression (FIM) 5 - Needs help/cues only RARELY (< 10% of the time)   Social Interaction   Social Interaction (FIM) 6 - Interacts appropriately with others BUT requires extra  time   Problem Solving   Problem solving (FIM) 5 - Solves basic problems 90% of time   Memory   Memory (FIM) 5 - [place] cues patient   Speech/Language/Cognition Assessmetn   Treatment Assessment Pt was in bed upon arrival into room, but was awake, alett and engaged in session  SLP asking about the past couple of days (knowing that pt had c/o chest pain yesterday and then urinary retention today), to where pt's report of urinary retention was noted to be fairly consistent as in chart review  However, pt verbalizing overall being motivated and wanting to 'work hard' in therapies  Of note, rehab aid was in room upon arrival, to where it was reported that pt had increased cough earlier in the day where pt was bringing up mucous  When engaging in d/w pt in regard to this, SLP did use [employer] ashlyn for improved comprehension given questions  Pt does deny any h/o allergies, post nasal drip prior to admission  In pt's description of symptoms to SLP, sounds likely due to post nasal drip type symptoms  When asking if pt has been exhibiting any increased cough given meals, pt did deny at this time  Otherwise, pt was complaining of increased L side neck pain, which pt did report receiving gel to area as well as tylenol as needed  Due to this SLP switched side of the bed currently sitting, moving to L side of the bed   Pt did report that sitting to this side felt better for the neck  Engaged pt in divergent category naming task, where SLP did provide the categories in [address]  and then [place]ian if not understanding the [address]  name of category  Pt's ability to spontaneously elicit 5 items per list was 40/40 accurate, by providing answers in both [place]ian/english if pt could not recall the name of an item in a category in [address]  Overall, pt was engaged and side conversations were held in between certain categories (ie: flowers, TV shows) where pt discussed further interests, noting good recall given current events (ie: discussing recent movies)  At this time, pt will continue to benefit from ongoing skilled SLP services targeting functional independence of cognitive linguistic skills in attempts to decrease caregiver burden over time  Of note, SLP reached out to MD, Dr Sherryle Mule to discuss decongestant due to suspected post nasal drip, to where it was noted that swallow evaluation orders have been placed  Will plan to complete bedside dysphagia assessment to r/o potential aspiration risk  SLP Therapy Minutes   SLP Time In 1330   SLP Time Out 1400   SLP Total Time (minutes) 30   SLP Mode of treatment - Individual (minutes) 30   SLP Mode of treatment - Concurrent (minutes) 0   SLP Mode of treatment - Group (minutes) 0   SLP Mode of treatment - Co-treat (minutes) 0   SLP Mode of Treatment - Total time(minutes) 30 minutes   SLP Cumulative Minutes 180   Therapy Time missed   Time missed?  No

## 2022-08-26 NOTE — PROGRESS NOTES
08/26/22 1230   Pain Assessment   Pain Assessment Tool 0-10   Pain Score No Pain   Restrictions/Precautions   Precautions Bed/chair alarms;Cognitive; Fall Risk;Supervision on toilet/commode   Weight Bearing Restrictions No   ROM Restrictions No   Braces or Orthoses AFO  (L AFO, silver TB wrap to assist with L knee DF)   Subjective   Subjective Agreeable to skilled PT intervention   Lying to Sitting on Side of Bed   Type of Assistance Needed Physical assistance   Physical Assistance Level 51%-75%   Comment modAx1 for trunk and LLE management   Lying to Sitting on Side of Bed CARE Score 2   Sit to Stand   Type of Assistance Needed Physical assistance   Physical Assistance Level 51%-75%   Comment mod-maxAx1 with therapist anterior to pt, pt holding arm around therapist waist   Sit to Stand CARE Score 2   Bed-Chair Transfer   Type of Assistance Needed Physical assistance   Physical Assistance Level 76% or more   Comment modAx1 Sit pivot transfer to R side pulling on w/c armrest, maxAx1 sit pivot transfer to L side   Chair/Bed-to-Chair Transfer CARE Score 2   Walk 10 Feet   Type of Assistance Needed Physical assistance   Physical Assistance Level Total assistance   Comment mod-maxAX2   Walk 10 Feet CARE Score 1   Walk 50 Feet with Two Turns   Reason if not Attempted Safety concerns   Walk 50 Feet with Two Turns CARE Score 88   Walk 150 Feet   Reason if not Attempted Safety concerns   Walk 150 Feet CARE Score 88   Walking 10 Feet on Uneven Surfaces   Reason if not Attempted Safety concerns   Walking 10 Feet on Uneven Surfaces CARE Score 88   Ambulation   Does the patient walk? 2  Yes   Primary Mode of Locomotion Prior to Admission Walk   Distance Walked (feet) 50 ft  (x1 but on straight path; 40'x1)   Assist Device Hand Hold  (R HHA and 2nd person on L side holding onto gait belt)   Gait Pattern Ataxic; Inconsistant Linda; Slow Linda;Decreased foot clearance;L foot drag;L hemiparesis; Narrow LUCY;Scissoring;Decreased L stance; Improper weight shift   Limitations Noted In Balance; Coordination; Endurance; Heel Strike;Midline Orientation;Posture; Safety; Sensation; Sequencing;Speed;Strength;Swing   Provided Assistance with: Balance;Limb Advancement;Direction;Limb Stabilization;Weight Shift;Trunk Support   Walk Assist Level Moderate Assist;Maximum Assist  (x2)   Findings mod-maxAx2; assist for weight shifting to R side to advance LLE with hand on L hip flexor providing tactile cueing to facilitate hip flexion, 2nd person assist to advance LLE at times, max cues and PT pushing into RUE to get pt to weight shift and utilize RUE for support  Use of Silver TB around LLE to assist with DF/hip flex, use of L AFO as well  Fatigued limiting ambulation distances  3rd person CF for safety and due to fatigue  Only performed straight distances this session   Assessment   Treatment Assessment 30 min skilled PT session focusing on gait trng with 2 person A, use of R HHA and 2nd person assisting at gait belt as when pt given L HHA goes into flexor synergy and does not weight bear through LUE  Use of sliver TB and L AFO to assist with LLE foot clearance and swing through  Pt with difficulty weight shifting, requires maxA for weight shift as well as tactile cueing at hip flexor to get swing through  Fatigue limiting amb distances this session  At this time cont with POC     PT Therapy Minutes   PT Time In 1230   PT Time Out 1300   PT Total Time (minutes) 30   PT Mode of treatment - Individual (minutes) 30   PT Mode of treatment - Concurrent (minutes) 0   PT Mode of treatment - Group (minutes) 0   PT Mode of treatment - Co-treat (minutes) 0   PT Mode of Treatment - Total time(minutes) 30 minutes   PT Cumulative Minutes 570

## 2022-08-26 NOTE — PROGRESS NOTES
[facility] Occupational Therapy Daily Note  Patient Active Problem List   Diagnosis    Other chest pain    Essential hypertension    Hyperlipidemia LDL goal <100    Stage 3 chronic kidney disease (HCC)    Osteoarthritis of both wrists    Chronic atrial fibrillation (HCC)    Tubulovillous adenoma    Gastroesophageal reflux disease without esophagitis    Pacemaker    B12 deficiency    Allergic rhinitis due to pollen    Cavus deformity of foot    Midline cystocele    Hallux abducto valgus, bilateral    Left renal artery stenosis (HCC)    Osteoarthritis of hip    Osteopenia    Pain due to onychomycosis of toenails of both feet    Left atrial enlargement    Lipoma of right upper extremity    Diverticulosis of colon    Abnormal nuclear stress test    Renal insufficiency    Arterial ischemic stroke, MCA (middle cerebral artery), right, acute ([address] )    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 ([address] )    Anemia    At risk for venous thromboembolism (VTE)    DARRIUS (acute kidney injury) ([address] )    Spastic hemiparesis of left dominant side as late effect of cerebral infarction ([address] )    Valvular heart disease    CAD (coronary artery disease)    Urinary retention    Neck pain    Healthcare maintenance    At risk for coping difficulty       Past Medical History:   Diagnosis Date    A-fib (HCC)     Anemia     Arthritis     Breast pain, right     Chest pain on breathing     Colon polyp     Cough     Diverticulosis     Encounter for screening colonoscopy     H/O sick sinus syndrome     Heart murmur     High cholesterol     History of atrial fibrillation     Rate ontrolled  On xarelto 15mg (creatinine 50) Followed by Dr Daniel Ruff with Cardiology     History of weight loss     Report loose fitting clothes  Weight stable (3lbs difference)  Last colo showed small tubular adenoma x2  Breast biopsy last showed fibrocystic changes  TSH wnl  Will check cbc, bmp, spep          Hx of long term use of blood thinners     Hypertension Irregular heart beat     Pacemaker     Preop examination     Shortness of breath     Viral bronchitis      Etiologic Diagnosis: right mca ischemic cva  Restrictions/Precautions  Precautions: Bed/chair alarms, Cognitive, Fall Risk, Supervision on toilet/commode  Weight Bearing Restrictions: No  ROM Restrictions: No  Braces or Orthoses: Other (Comment) (multipodus boot to LLE donned end of session, use of L RH splint for 20 mins during session however pt c/o pain in hand with prolonged wearing)  ADL Team Goal: Patient will be independent with ADLs with least restrictive device upon completion of rehab program  Occupational Therapy LTG's  Eating Oral care Bathing LB dress UB dress   Eating Goal: 06  Independent - Patient completes the activity by him/herself with no assistance from a helper  Oral Hygiene Goal: 06  Independent - Patient completes the activity by him/herself with no assistance from a helper  Shower/bathe self Goal: 06  Independent - Patient completes the activity by him/herself with no assistance from a helper  Lower body dressing Goal: 06  Independent - Patient completes the activity by him/herself with no assistance from a helper  Upper body dressing Goal: 06  Independent - Patient completes the activity by him/herself with no assistance from a helper  Toileting Toilet txf Func txf IADL Med    Toileting hygiene Goal: 06  Independent - Patient completes the activity by him/herself with no assistance from a helper  Toilet transfer Goal: 06  Independent - Patient completes the activity by him/herself with no assistance from a helper           OT interventions: Treatment/Interventions: (P) ADL retraining, Functional transfer training, LE strengthening/ROM, Therapeutic exercise, Endurance training, Cognitive reorientation, Patient/family training, Equipment eval/education, Gait training  Discharge Plan:  OT Discharge Recommendation: (P)  (TBD pending progress)   DME:    08/26/22 1000   Pain Assessment Pain Assessment Tool FLACC   Pain Rating: FLACC (Activity) - Face 0   Pain Rating: FLACC (Activity) - Legs 0   Pain Rating: FLACC (Activity) - Activity 0   Pain Rating: FLACC (Activity) - Cry 0   Pain Rating: FLACC (Activity) - Consolability 0   Score: FLACC (Activity) 0   Lifestyle   Autonomy "I still do not feel good "   Putting On/Taking Off Footwear   Type of Assistance Needed Physical assistance   Physical Assistance Level 76% or more   Comment Pt able to doff sneaker sitting EOB with attempts for L UE weight bearing   Putting On/Taking Off Footwear CARE Score 2   Lying to Sitting on Side of Bed   Type of Assistance Needed Physical assistance   Physical Assistance Level Total assistance   Lying to Sitting on Side of Bed CARE Score 1   Bed-Chair Transfer   Type of Assistance Needed Physical assistance   Physical Assistance Level 76% or more   Comment SLide board completed to L side  poor trunk control in sitting, heavy pushes into extension during efforts  Chair/Bed-to-Chair Transfer CARE Score 2   Neuromuscular Education   Weight Bearing Technique Yes   LUE Weight Bearing Extended arm seated   Response to Weight Bearing Technique no response with innability to complete elbow extension  Functional Movement Patterns pt complete focus on active extension pattern movements in sitting and supine  increased AROM noted while in supine   Splinting   Splinting Comments Assessed resting hand splint as Pt reporting discomfort with prolonged wear and staff reporting poor fitting  Size appears appropriate for hand  Volar hand plate at this time may not be most appropriate with reactive flexor spasticity  When placed in splint, after time unable to maintain appropriate fitting 2* spasticity  May benefit from dynamic splint, or dorsal fitting splint  Flexor spasticity increases with any input to stretch, or input to palm of the hand  Spasticity including scapular retractors, pectoralis, ant delt   Focus on progressive relaxation with focus on isolation of single joint extension patterns  Able to isolate wrist, minimal movement for elbow and shoulder  Unable to provide tactile facilitation input 2* reactive flexor spasticity  Dr Oli Holt present for session, and discussed potential use of botox, and potential increase 2* recent urinary difficulties  Assessment   Treatment Assessment Pt participated in skilled OT treatment session with treatment focus on L attention and UE NMR  Pt tolerated session well, despite reports of not feeling well  At this time use of volar plate resting hand splint may not be recommended until flexor spasticity reduces  In the interim pt can be cued to focus on wrist and digit extension while relaxing UE  Will cont to evaluate appropriateness of resting hand splint for night time wearing at this time  OT Family training done with: Supportive son present at end of session  educated on implications of flexor spasticity for safety and functional activites  Prognosis Fair   Problem List Decreased range of motion;Decreased strength;Decreased endurance; Impaired balance;Decreased mobility; Decreased coordination;Decreased cognition   Plan   Treatment/Interventions ADL retraining;Functional transfer training;LE strengthening/ROM; Therapeutic exercise; Endurance training;Cognitive reorientation;Patient/family training;Equipment eval/education;Gait training   Progress Progressing toward goals   Recommendation   OT Discharge Recommendation   (TBD pending progress)   OT Therapy Minutes   OT Time In 1000   OT Time Out 1100   OT Total Time (minutes) 60   OT Mode of treatment - Individual (minutes) 60   OT Mode of treatment - Concurrent (minutes) 0   OT Mode of treatment - Group (minutes) 0   OT Mode of treatment - Co-treat (minutes) 0   OT Mode of Treatment - Total time(minutes) 60 minutes   OT Cumulative Minutes 420

## 2022-08-27 PROCEDURE — 97112 NEUROMUSCULAR REEDUCATION: CPT

## 2022-08-27 PROCEDURE — 97110 THERAPEUTIC EXERCISES: CPT

## 2022-08-27 PROCEDURE — 97530 THERAPEUTIC ACTIVITIES: CPT

## 2022-08-27 PROCEDURE — 92610 EVALUATE SWALLOWING FUNCTION: CPT

## 2022-08-27 PROCEDURE — 97535 SELF CARE MNGMENT TRAINING: CPT

## 2022-08-27 PROCEDURE — 97116 GAIT TRAINING THERAPY: CPT

## 2022-08-27 PROCEDURE — 99232 SBSQ HOSP IP/OBS MODERATE 35: CPT | Performed by: INTERNAL MEDICINE

## 2022-08-27 RX ORDER — FLUTICASONE PROPIONATE 50 MCG
1 SPRAY, SUSPENSION (ML) NASAL DAILY
Status: DISCONTINUED | OUTPATIENT
Start: 2022-08-28 | End: 2022-08-29

## 2022-08-27 RX ORDER — BENZONATATE 100 MG/1
100 CAPSULE ORAL 3 TIMES DAILY
Status: DISCONTINUED | OUTPATIENT
Start: 2022-08-27 | End: 2022-08-28

## 2022-08-27 RX ADMIN — MENTHOL, METHYL SALICYLATE: 10; 15 CREAM TOPICAL at 21:26

## 2022-08-27 RX ADMIN — DABIGATRAN ETEXILATE MESYLATE 150 MG: 150 CAPSULE ORAL at 09:00

## 2022-08-27 RX ADMIN — BACLOFEN 5 MG: 10 TABLET ORAL at 21:24

## 2022-08-27 RX ADMIN — LOSARTAN POTASSIUM 100 MG: 50 TABLET, FILM COATED ORAL at 09:00

## 2022-08-27 RX ADMIN — DABIGATRAN ETEXILATE MESYLATE 150 MG: 150 CAPSULE ORAL at 21:24

## 2022-08-27 RX ADMIN — Medication 3 MG: at 21:24

## 2022-08-27 RX ADMIN — BACLOFEN 5 MG: 10 TABLET ORAL at 16:38

## 2022-08-27 RX ADMIN — BENZONATATE 100 MG: 100 CAPSULE ORAL at 21:24

## 2022-08-27 RX ADMIN — FAMOTIDINE 20 MG: 20 TABLET, FILM COATED ORAL at 09:00

## 2022-08-27 RX ADMIN — VERAPAMIL HYDROCHLORIDE 240 MG: 240 TABLET ORAL at 21:24

## 2022-08-27 RX ADMIN — MAGNESIUM OXIDE TAB 400 MG (241.3 MG ELEMENTAL MG) 400 MG: 400 (241.3 MG) TAB at 09:00

## 2022-08-27 RX ADMIN — BENZONATATE 100 MG: 100 CAPSULE ORAL at 16:38

## 2022-08-27 RX ADMIN — MENTHOL, METHYL SALICYLATE 1 APPLICATION: 10; 15 CREAM TOPICAL at 09:02

## 2022-08-27 RX ADMIN — BACLOFEN 5 MG: 10 TABLET ORAL at 09:00

## 2022-08-27 RX ADMIN — NEBIVOLOL 5 MG: 5 TABLET ORAL at 09:01

## 2022-08-27 RX ADMIN — FLUTICASONE PROPIONATE 1 SPRAY: 50 SPRAY, METERED NASAL at 16:38

## 2022-08-27 RX ADMIN — PRAVASTATIN SODIUM 40 MG: 40 TABLET ORAL at 16:38

## 2022-08-27 NOTE — PROGRESS NOTES
08/27/22 1400   Pain Assessment   Pain Assessment Tool 0-10   Pain Score No Pain   Restrictions/Precautions   Precautions Bed/chair alarms;Aspiration;Supervision on toilet/commode; Fall Risk;Cognitive   Weight Bearing Restrictions No   ROM Restrictions No   Braces or Orthoses   (AFO, mutipodus when in bed)   Cognition   Overall Cognitive Status Impaired   Arousal/Participation Alert; Cooperative   Attention Attends with cues to redirect   Orientation Level Oriented X4   Following Commands Follows one step commands with increased time or repetition   Subjective   Subjective Pt in bed, awake, agreeable to PT  Pt report of difficulty urinating, RN straight cath patient prior to session   Roll Left and Right   Type of Assistance Needed Physical assistance; Adaptive equipment   Physical Assistance Level 51%-75%   Comment roll to L:supervision and roll to R mod A   Roll Left and Right CARE Score 2   Sit to Lying   Type of Assistance Needed Physical assistance; Adaptive equipment   Physical Assistance Level 51%-75%   Sit to Lying CARE Score 2   Lying to Sitting on Side of Bed   Type of Assistance Needed Physical assistance; Adaptive equipment   Physical Assistance Level 51%-75%   Lying to Sitting on Side of Bed CARE Score 2   Sit to Stand   Type of Assistance Needed Physical assistance;Verbal cues; Adaptive equipment   Physical Assistance Level 26%-50%   Comment STS using siderail, bedrail and RW   Sit to Stand CARE Score 3   Bed-Chair Transfer   Type of Assistance Needed Physical assistance;Verbal cues   Physical Assistance Level 26%-50%   Comment bed to w/c stand pivot   Chair/Bed-to-Chair Transfer CARE Score 3   Transfer Bed/Chair/Wheelchair   Limitations Noted In Balance; Coordination; Endurance;UE Strength;LE Strength; Sequencing   Adaptive Equipment Roller Walker   Stand Pivot Moderate Assist  (bed to w/c, suing bedrail)   Sit to Stand Moderate Assist  (RW or siderail)   Stand to Sit Moderate Assist   Supine to Sit Moderate Assist   Sit to Supine Maximum Assist   Findings cueing for sequence and hand placement   Car Transfer   Reason if not Attempted Environmental limitations   Car Transfer CARE Score 10   Walk 10 Feet   Type of Assistance Needed Physical assistance   Physical Assistance Level Total assistance   Comment mod of 1 and 2nd person for w/c follow   Walk 10 Feet CARE Score 1   Walk 50 Feet with Two Turns   Reason if not Attempted Safety concerns   Walk 50 Feet with Two Turns CARE Score 88   Walk 150 Feet   Reason if not Attempted Safety concerns   Walk 150 Feet CARE Score 88   Walking 10 Feet on Uneven Surfaces   Reason if not Attempted Safety concerns   Walking 10 Feet on Uneven Surfaces CARE Score 88   Ambulation   Does the patient walk? 2  Yes   Primary Mode of Locomotion Prior to Admission Walk   Distance Walked (feet) 30 ft  (30ft with siderail, 15ft x 2 with RW)   Assist Device Roller Walker   Gait Pattern Decreased foot clearance;L foot drag;Improper weight shift;Decreased L stance   Limitations Noted In Balance;Device Management; Coordination; Endurance; Sequencing;Speed;Strength;Swing   Provided Assistance with: Balance;Weight Shift   Walk Assist Level Moderate Assist;Chair Follow   Findings 2nd person for w/c follow, use AFO and black TB, need assist with wt shifting, to occasional assist to advance L LE   Wheel 50 Feet with Two Turns   Type of Assistance Needed Physical assistance   Physical Assistance Level 26%-50%   Wheel 50 Feet with Two Turns CARE Score 3   Wheel 150 Feet   Type of Assistance Needed Physical assistance;Verbal cues   Physical Assistance Level 26%-50%   Wheel 150 Feet CARE Score 3   Wheelchair mobility   Does the patient use a wheelchair? 1  Yes   Type of Wheelchair Used 1   Manual   Method Right upper extremity;Right lower extremity   Assistance Provided For Locking Brakes;Obstacles;Remove Leg Rest;Replace Leg Rest   Distance Level Surface (feet) 150 ft   Curb or Single Stair   Reason if not Attempted Safety concerns   1 Step (Curb) CARE Score 88   4 Steps   Reason if not Attempted Activity not applicable   4 Steps CARE Score 9   12 Steps   Reason if not Attempted Activity not applicable   12 Steps CARE Score 9   Picking Up Object   Reason if not Attempted Safety concerns   Picking Up Object CARE Score 88   Therapeutic Interventions   Strengthening STS c RW, x 4 reps x 2, mod assist, B feet evenly // to facilitate inc WB, proproception on L LE  Seated: L QS with tactile cues, hip flex and LAQ AAROM/PROM, ankle DF/PF PROM  R LE hip flex and LAQ 2#, hamstring curls c RTB x 15 reps   Flexibility Passive hamstring and gastroc str (B)   Other Standing with siderail support, lat wt shifting x 10 reps x 2 sets   Assessment   Treatment Assessment Patient participated in skilled PT focus on improving balance, mobility, strength and activity tolerance  Pt needed mod/max assist of 1 for bed mobility  She able to transfer bed<>w/c, stand pivot techique, toward the good side, mod assist  Pt able to amb x 30ft, using siderail, mod assist  Trial gait training with RW, she amb x 15ft x 2, mod assist of 1 and 2nd person for w/c follow  Pt needed assist for wt shifting and occasional assist in advancing L LE  At this time, pt is demonstrate better lat wt shifting and advancing L LE when using siderail  Pt need frequent rest due to ongoing deficit in activity tolerance  Pt will benefit from continued skilled Pt to further improve mobility, balance, activity and strength  Will cont to asses for most appropriate AD for transfer and gait  Problem List Decreased strength;Decreased endurance; Impaired balance;Decreased mobility; Decreased coordination   Barriers to Discharge Inaccessible home environment;Decreased caregiver support   PT Barriers   Physical Impairment Decreased strength;Decreased endurance; Impaired balance;Decreased mobility; Decreased coordination   Functional Limitation Car transfers;Stair negotiation;Standing;Transfers; Walking; Wheelchair management   Plan   Treatment/Interventions Functional transfer training;LE strengthening/ROM; Elevations; Endurance training; Therapeutic exercise;Patient/family training;Gait training;Bed mobility   Progress Progressing toward goals   Recommendation   PT Discharge Recommendation Post acute rehabilitation services   PT Therapy Minutes   PT Time In 1400   PT Time Out 1530   PT Total Time (minutes) 90   PT Mode of treatment - Individual (minutes) 90   PT Mode of treatment - Concurrent (minutes) 0   PT Mode of treatment - Group (minutes) 0   PT Mode of treatment - Co-treat (minutes) 0   PT Mode of Treatment - Total time(minutes) 90 minutes   PT Cumulative Minutes 705   Therapy Time missed   Time missed?  No

## 2022-08-27 NOTE — PROGRESS NOTES
OT Treatment Note       08/27/22 7362   Pain Assessment   Pain Assessment Tool 0-10   Pain Score 8   Pain Location/Orientation Orientation: Left; Location: Neck   Hospital Pain Intervention(s) Repositioned  (RN provided pain cream start of session)   Restrictions/Precautions   Precautions Bed/chair alarms;Cognitive; Fall Risk;Supervision on toilet/commode;Aspiration   Weight Bearing Restrictions No   ROM Restrictions No   Braces or Orthoses   (L AFO, multipodus boot LLE after session)   Lifestyle   Autonomy Pt agreeable to OT session   Lower Body Dressing   Type of Assistance Needed Physical assistance   Physical Assistance Level 76% or more   Comment Donning brief bed level with TA via rolling side to side  Seated EOB, pt requiring assistance to thread LLE through pant leg, attempting to thread RLE through pant leg though requiring assist 2* impaired functional reach and sitting balance  Pt requiring modA to hike pants over L hip, able to hike over R hip with Tan to steady in stance  Lower Body Dressing CARE Score 2   Putting On/Taking Off Footwear   Type of Assistance Needed Physical assistance   Physical Assistance Level Total assistance   Comment overall TA to don slip on sneakers and L AFO   Putting On/Taking Off Footwear CARE Score 1   Lying to Sitting on Side of Bed   Type of Assistance Needed Physical assistance   Physical Assistance Level 51%-75%   Comment requiring assistance for LLE and to scoot forward to EOB in prep for functional transfer   Lying to Sitting on Side of Bed CARE Score 2   Sit to Stand   Type of Assistance Needed Physical assistance   Physical Assistance Level 26%-50%   Comment multiple reps; pt initially reaching for therapist's arm to pull into stance  With vc's for anterior weightshift and to push from both mat and WC armrest with RUE, pt able to complete STS with decreased retropulsion today  Min guarding L knee   Completing with Londa Olszewski progressing to Tan   Sit to Stand CARE Score 3 Bed-Chair Transfer   Type of Assistance Needed Physical assistance   Physical Assistance Level 26%-50%   Comment sit pivot from EOB, WC, mat to both directions with modA   Chair/Bed-to-Chair Transfer CARE Score 3   Neuromuscular Education   Comments Pt participating in various NMR for increased functional (I) and use of LUE  Seated EOM, pt weightbearing through large yoga ball BUE for shoulder/trunk flexion, CGA and vc's for technique  Seated EOM with SUP, pt WB through extended LUE while functionally reaching in cross diagonal patterns to retrieve objects with RUE  Pt then functionally reaching in various planes to retrieve objects with LUE with OT encouraging ER and shoulder abd  Seated EOM pt completing cross diagonal chops 10x2 reps each direction with visual target  In stance at table in prep for toileting and LB dressing tasks, pt functionally reaching with LUE to upper thigh and around trunk to retrieve resistive clips and place on tree  Pt able to stand with CGA with min blocking L knee, no LOB or lateral/post lean noted  Cognition   Overall Cognitive Status Impaired   Arousal/Participation Alert; Cooperative   Attention Attends with cues to redirect   Orientation Level Oriented X4   Memory Decreased short term memory   Following Commands Follows one step commands with increased time or repetition   Activity Tolerance   Activity Tolerance Patient tolerated treatment well   Assessment   Treatment Assessment Pt seen for 90 min OT session focusing on ADL tasks, bed mobility, functional transfers, NMR LUE and standing balance/tolerance  Pt tolerating session well today, demo'ing increased (I) with sit pivot transfers and standing balance activities  OT to continue POC to focus on NMR LUE, standing balance/tolerance, and functional cog to maximize safety and (I) with BADLs and functional transfers  Prognosis Fair   Problem List Decreased strength;Decreased range of motion;Decreased endurance; Impaired balance;Decreased mobility; Decreased coordination;Decreased cognition; Impaired tone;Pain   Barriers to Discharge Inaccessible home environment;Decreased caregiver support   Plan   Treatment/Interventions ADL retraining;Functional transfer training; Therapeutic exercise; Endurance training;Cognitive reorientation;Patient/family training;Equipment eval/education; Bed mobility; Compensatory technique education;Spoke to nursing   Progress Progressing toward goals   Recommendation   OT Discharge Recommendation   (pending progress)   OT Therapy Minutes   OT Time In 0830   OT Time Out 1000   OT Total Time (minutes) 90   OT Mode of treatment - Individual (minutes) 90   OT Mode of treatment - Concurrent (minutes) 0   OT Mode of treatment - Group (minutes) 0   OT Mode of treatment - Co-treat (minutes) 0   OT Mode of Treatment - Total time(minutes) 90 minutes   OT Cumulative Minutes 510   Therapy Time missed   Time missed?  No

## 2022-08-27 NOTE — PLAN OF CARE
Problem: Prexisting or High Potential for Compromised Skin Integrity  Goal: Skin integrity is maintained or improved  Description: INTERVENTIONS:  - Identify patients at risk for skin breakdown  - Assess and monitor skin integrity  - Assess and monitor nutrition and hydration status  - Monitor labs   - Assess for incontinence   - Turn and reposition patient  - Assist with mobility/ambulation  - Relieve pressure over bony prominences  - Avoid friction and shearing  - Provide appropriate hygiene as needed including keeping skin clean and dry  - Evaluate need for skin moisturizer/barrier cream  - Collaborate with interdisciplinary team   - Patient/family teaching  - Consider wound care consult   Outcome: Progressing     Problem: Potential for Falls  Goal: Patient will remain free of falls  Description: INTERVENTIONS:  - Educate patient/family on patient safety including physical limitations  - Instruct patient to call for assistance with activity   - Consult OT/PT to assist with strengthening/mobility   - Keep Call bell within reach  - Keep bed low and locked with side rails adjusted as appropriate  - Keep care items and personal belongings within reach  - Initiate and maintain comfort rounds  - Make Fall Risk Sign visible to staff  - Offer Toileting every 2-4 Hours, in advance of need  - Initiate/Maintain bed/chair alarm  - Obtain necessary fall risk management equipment: nonskid footwear  - Apply yellow socks and bracelet for high fall risk patients  - Consider moving patient to room near nurses station  Outcome: Progressing

## 2022-08-27 NOTE — PROGRESS NOTES
SLP Clinical Swallow Evaluation       08/27/22 1200   Pain Assessment   Pain Assessment Tool 0-10   Pain Score No Pain   Restrictions/Precautions   Precautions Bed/chair alarms;Cognitive; Fall Risk;Supervision on toilet/commode   Weight Bearing Restrictions No   ROM Restrictions No   Comprehension   QI: Comprehension 3  Usually Understands: Understands most conversations, but misses some part/intent of message  Requires cues at times to understand  Comprehension (FIM) 5 - Understands basic directions and conversation   Expression   QI: Expression 3  Exhibits some difficulty with expressing needs and ideas (e g , some words or finishing thoughts) or speech is not clear   Expression (FIM) 5 - Needs help/cues only RARELY (< 10% of the time)   Social Interaction   Social Interaction (FIM) 6 - Interacts appropriately with others BUT requires extra  time   Problem Solving   Problem solving (FIM) 5 - Solves basic problems 90% of time   Memory   Memory (FIM) 5 - Recalls/performs request 90% of time   Speech/Swallow Mechanism Exam   Labial Symmetry WFL   Labial Strength WFL   Labial ROM WFL   Labial Sensation WFL   Facial Symmetry WFL   Facial Strength WFL   Facial ROM WFL   Facial Sensation WFL   Lingual Symmetry WFL   Lingual Strength WFL   Lingual ROM WFL   Lingual Sensation WFL   Mandible WFL   Dentition Adequate   Volitional Cough Strong   Vocal Quality WNL   Volitional Swallow   (unable to elicit on command)   Respratory Status Room air   Swallow Information   Current Risks for Dysphagia & Aspiration   (Coughing on saliva)   Current Symptoms/Concerns Cough   Current Diet Regular; Thin liquid   Baseline Diet Regular; Thin liquids   Consistencies Assessed and Performance   Materials Admnistered Regular/Solid; Thin liquid   Oral Stage WFL; Mild impaired   Phargngeal Stage WFL; Mild impaired   Swallow Mechanics Mild delayed;Swallow initation; Appears prompt;Weak larygneal rise;Good Larygneal rise   Esophageal Concerns No s/s reported   Strategies and Efficacy Pt independently alternated liquids and solids, took multiple swallow w/ larger bites  SLP provided cueing occasionally for smaller bites and slow rate  Recommendations   Risk for Aspiration Present   Recommendations   (Skilled SLP services are not warranted at this time as oral motor skills and swallowing function are [facility] )   Diet Solid Recommendation Regular consistency   Diet Liquid Recommendation Thin liquid   Recommended Form of Meds As tolerated; As desired   General Precautions Aspiration precautions; Feed only when alert;Minimize distractions;Upright as possible for all oral intake;Remain upright for 45 mins after meals   Compensatory Swallowing Strategies External pacing;Effortful swallow; Alternate solids and liquids   Results Reviewed with PT/Family/Caregiver   Eating   Type of Assistance Needed Set-up / clean-up   Physical Assistance Level No physical assistance   Eating CARE Score 5   Swallow Assessment   Swallow Treatment Assessment See below  Swallow Assessment Prognosis   Prognosis Good   Prognosis Considerations Age; Co-morbidities; Medical diagnosis   SLP Therapy Minutes   SLP Time In 1200   SLP Time Out 1230   SLP Total Time (minutes) 30   SLP Mode of treatment - Individual (minutes) 30   SLP Mode of treatment - Concurrent (minutes) 0   SLP Mode of treatment - Group (minutes) 0   SLP Mode of treatment - Co-treat (minutes) 0   SLP Mode of Treatment - Total time(minutes) 30 minutes   SLP Cumulative Minutes 210   Therapy Time missed   Time missed? No   Daily FIM Score   Eating (FIM) 5 - Patient needs help to open contianers or set up tray     Clinical Swallow Evaluation  Pt was seen for a clinical swallow evaluation this date due to concerns for coughing on salvia  Pt denies changes with her chewing or swallowing since hospital admission, but noted she has been coughing up mucous  She is currently recommended for a regular diet and thin liquids   She was seen for lunch meal with regular items (cheese burger, tater tots, chicken noodle soup, and thin liquid via straw)  She consumed ~65% of meal and ~120cc of thin liquid  Pt required assistance with tray set, but she was able to feed herself without significant difficulty  Upon brief oral mechanism exam, pt demonstrated appropriate oral motor skills to support eating and swallowing  She was unable to generate a swallow on command without a bolus  Pt was able to generate a strong cough on command  Oral Phase:  Lip closure: adequate  Anterior spillage: none  Mastication: functional, timely chewing breakdown (despite larger bites at times)  Bolus formation: adeqaute  Bolus control: appropriate  Transfer: timely, but piece meal transfers noted with larger bites  Oral residue: minimally noted between bites but cleared given increased time and use of liquid wash  Pocketing: none     Pharyngeal stage:  Swallow promptness: appeared timely w/ solids, min delayed with thin liquids  Hyolaryngeal elevation: mildy reduced w/ chicken noodle soup and thin liquids, but adequate with cheeseburger trials  Wet voice: none  Throat clear: x0  Cough: delayed cough x1 after chicken noodle soup trials  Secondary swallows: w/ all solid consistencies  Audible swallows: none    Esophageal Phase: No overt s/s of esophageal dysphagia  Impression/Recommendations:  Based on today's oral mechanism exam and observation of meal, pt demonstrates oral motor skills and swallowing function that are considered WFL  Adequate oral motor coordination/control and strong cough are positive factors in reducing her risk for aspiration  Pt denies chronic coughing and/or overt s/sx of aspiration during mealtime, but notes coughing up mucous between meals; therefore, suspect coughing may be secondary to sinuses/post nasal drip   SLP reviewed safe swallowing strategies (slow rate, small bites, alternating liquids and solids, remain upright for 30-45 mins after eating) and pt verbalized understanding  Pt independently alternated liquids and solids consistently throughout meal  Although she was noted to occasionally take larger bites and use a faster rate, she safely managed mixed consistencies  Skilled SLP services targeting dysphagia are not warranted at this time as , but encourage pt to alert staff if she notices any changes with swallowing function

## 2022-08-27 NOTE — PROGRESS NOTES
Internal Medicine Progress Note  Patient: Hampton Schilder  Age/sex: 80 y o  female  Medical Record #: [id number]      ASSESSMENT/PLAN: (Interval History)  Hampton Schilder is seen and examined and management for following issues:    Acute embolic right MCA CVA  · Had near occlusion of right MCA  · Felt to be a Xarelto failure and was switched to Pradaxa  · ECHO w/o thrombus  · Continue statin  · On Baclofen for tone left arm = much improved     Chronic atrial fibrillation  · Sees Dr Kayla Rodrigues as OP  · Rates controlled  · On Pradaxa now, Xarelto stopped     PPM  · VVI  · Is not MRI conditional     Valvular disease  · Current ECHO:  LVEF 65%, mod AR/mild AS, mod TR with severely elevated RV systolic pressure, mild-mod MR, mod LAE/mild NEO  · Euvolemic currently     HTN  · Home:  Verapamil 240mg qhs/Cozaar 656 mg qd/Bystolic 5mg qd/lasix 04CO qd/Aldactone 25mg qd  · Here:  Verapamil 240mg qhs/Cozaar 970MB qd/Bystolic 5mg qd  · OP note states has left RA stenosis but no testing in OP records or Care Everywhere to verify  · No changes again today     HLD  · Home:  Crestor 10mg qd = Neuro rec when discharged to reduce to 5mg qd  · Here:  Pravachol 40mg qd     DARRIUS  · S/p NS 1 liter   · Back to baseline  · Continue to hold Lasix and Aldactone for now     CAD  · Nonobstructive  · Card cath 8/2021 done for borderline abn stress test and found 50% mid RCA/40% mid LAD = no intervention done  · Continue statin  · Was not on ASA as an OP     Hypomagnesemia  · Takes here and at home 400 mg MagOx  · Mg level 8/20 was 2 4     Left chest pain  · Had left neck pain (not new for her) then pain under left breast = reproducible with palpation  · EKG was 100% v-paced but no obvious acute changes; trops negative Bengay applied  · At the time, felt likely M/S from spasm  She then had worsening pain in chest and described pressure so RR called    · Trops = 29/-, 28/-1, 28/-1  · Etio is her spasticity and Dr Rehana Ramos inc Baclofen to 5mg TID and 5mg qhs prn which has helped but she is still having some left neck discomfort     Cough  · Have not witnessed her coughing but she endorses  · Has some clear-whitish secretions  · CXR negative 8/25  · Dr Johanna Gu added Pepcid in case etio of cough is 2/2 GERD  · Has prn Robitussin   · Add Flonase and scheduled [facility] planning: Reteam       The above assessment and plan was reviewed and updated as determined by my evaluation of the patient on 8/27/2022      Labs:   Results from last 7 days   Lab Units 08/25/22  0452 08/22/22  0458   WBC Thousand/uL 8 30 7 33   HEMOGLOBIN g/dL 11 6 12 0   HEMATOCRIT % 37 9 38 6   PLATELETS Thousands/uL 386 360     Results from last 7 days   Lab Units 08/25/22 0452 08/22/22  0458   SODIUM mmol/L 134* 134*   POTASSIUM mmol/L 4 5 4 7   CHLORIDE mmol/L 106 108   CO2 mmol/L 24 22   BUN mg/dL 32* 31*   CREATININE mg/dL 1 05 1 06   CALCIUM mg/dL 9 3 9 3                   Review of Scheduled Meds:  Current Facility-Administered Medications   Medication Dose Route Frequency Provider Last Rate    acetaminophen  650 mg Oral Q6H PRN Jason Forte MD      baclofen  5 mg Oral TID Rich Soto MD      baclofen  5 mg Oral HS PRN Rich Soto MD      bisacodyl  10 mg Rectal Daily PRN Jason Forte MD      dabigatran etexilate  150 mg Oral Q12H [facility] Jason Forte MD      famotidine  20 mg Oral Daily Rich Soto MD      guaiFENesin  200 mg Oral Q6H PRN Hernandez Litten, CRNP      losartan  100 mg Oral Daily Jason Forte MD      magnesium oxide  400 mg Oral Daily Jason Forte MD      melatonin  3 mg Oral QPM Rich Soto MD      menthol-methyl salicylate   Apply externally BID Rich Soto MD      nebivolol  5 mg Oral Daily Jason Forte MD      nitroglycerin  0 4 mg Sublingual Q5 Min PRN Hernandez Litten, CRNP      polyethylene glycol  17 g Oral Daily PRN Jason Forte MD      pravastatin  40 mg Oral Daily With [employer] M Karen Hurley MD      verapamil  240 mg Oral HS Fatimah Sarmiento MD         Subjective/ HPI: Patient seen and examined  Patients overnight issues or events were reviewed with nursing or staff during rounds or morning huddle session  New or overnight issues include the following:     No new or overnight issues  Offers no complaints    ROS:   A 10 point ROS was performed; negative except as noted above  Imaging:     XR chest portable   Final Result by Patricia Quezada MD ([id number])      No acute cardiopulmonary disease  Workstation performed: [id number]             *Labs /Radiology studies reviewed  *Medications reviewed and reconciled as needed  *Please refer to order section for additional ordered labs studies  *Case discussed with primary attending during morning huddle case rounds    Physical Examination:  Vitals:   Vitals:    08/26/22 0623 08/26/22 1447 08/26/22 2100 08/27/22 0646   BP: (!) 107/46 147/66 145/65 127/60   BP Location: Right arm Left arm Right arm Right arm   Pulse: 60 64 68 60   Resp: 17 18 18 16   Temp: (!) 97 4 °F (36 3 °C) 98 1 °F (36 7 °C) 98 1 °F (36 7 °C) 97 5 °F (36 4 °C)   TempSrc: Oral Oral Oral Oral   SpO2: 100% 98% 95% 98%   Weight:       Height:           General Appearance: no distress, conversive  HEENT: PERRLA, conjuctiva normal; oropharynx clear; mucous membranes moist   Neck:  Supple, normal ROM  Lungs: CTA, normal respiratory effort, no retractions, expiratory effort normal  CV: regular rate and rhythm; no rubs/murmurs/gallops, PMI normal   ABD: soft; ND/NT; +BS  EXT: no edema  Skin: normal turgor, normal texture, no rashes  Psych: affect normal, mood normal  Neuro: AAO      The above physical exam was reviewed and updated as determined by my evaluation of the patient on 8/27/2022      Invasive Devices  Report    Drain  Duration           External Urinary Catheter 3 days                   VTE Pharmacologic Prophylaxis: Pradaxa  Code Status: Level 1 - Full Code  Current Length of Stay: 8 day(s)      Total time spent:  30 minutes with more than 50% spent counseling/coordinating care  Counseling includes discussion with patient re: progress  and discussion with patient of his/her current medical state/information  Coordination of patient's care was performed in conjunction with primary service  Time invested included review of patient's labs, vitals, and management of their comorbidities with continued monitoring  In addition, this patient was discussed with medical team including physician and advanced extenders  The care of the patient was extensively discussed and appropriate treatment plan was formulated unique for this patient  ** Please Note:  voice to text software may have been used in the creation of this document   Although proof errors in transcription or interpretation are a potential of such software**

## 2022-08-27 NOTE — PLAN OF CARE
Problem: MOBILITY - ADULT  Goal: Maintain or return to baseline ADL function  Description: INTERVENTIONS:  -  Assess patient's ability to carry out ADLs; assess patient's baseline for ADL function and identify physical deficits which impact ability to perform ADLs (bathing, care of mouth/teeth, toileting, grooming, dressing, etc )  - Assess/evaluate cause of self-care deficits   - Assess range of motion  - Assess patient's mobility; develop plan if impaired  - Assess patient's need for assistive devices and provide as appropriate  - Encourage maximum independence but intervene and supervise when necessary  - Involve family in performance of ADLs  - Assess for home care needs following discharge   - Consider OT consult to assist with ADL evaluation and planning for discharge  - Provide patient education as appropriate  Outcome: Progressing  Goal: Maintains/Returns to pre admission functional level  Description: INTERVENTIONS:  - Set and communicate daily mobility goal to care team and patient/family/caregiver  - Collaborate with rehabilitation services on mobility goals if consulted  - Perform Range of Motion 3 times a day  - Reposition patient every 2 hours    - Dangle patient 3 times a day  - Stand patient 3 times a day  - Ambulate patient 3 times a day  - Out of bed to chair 3 times a day   - Out of bed for meals 3 times a day  - Out of bed for toileting  - Record patient progress and toleration of activity level   Outcome: Progressing     Problem: Prexisting or High Potential for Compromised Skin Integrity  Goal: Skin integrity is maintained or improved  Description: INTERVENTIONS:  - Identify patients at risk for skin breakdown  - Assess and monitor skin integrity  - Assess and monitor nutrition and hydration status  - Monitor labs   - Assess for incontinence   - Turn and reposition patient  - Assist with mobility/ambulation  - Relieve pressure over bony prominences  - Avoid friction and shearing  - Provide appropriate hygiene as needed including keeping skin clean and dry  - Evaluate need for skin moisturizer/barrier cream  - Collaborate with interdisciplinary team   - Patient/family teaching  - Consider wound care consult   Outcome: Progressing     Problem: Potential for Falls  Goal: Patient will remain free of falls  Description: INTERVENTIONS:  - Educate patient/family on patient safety including physical limitations  - Instruct patient to call for assistance with activity   - Consult OT/PT to assist with strengthening/mobility   - Keep Call bell within reach  - Keep bed low and locked with side rails adjusted as appropriate  - Keep care items and personal belongings within reach  - Initiate and maintain comfort rounds  - Make Fall Risk Sign visible to staff  - Offer Toileting every 2-4 Hours, in advance of need  - Initiate/Maintain bed/chair alarm  - Obtain necessary fall risk management equipment: nonskid footwear  - Apply yellow socks and bracelet for high fall risk patients  - Consider moving patient to room near nurses station  Outcome: Progressing     Problem: PAIN - ADULT  Goal: Verbalizes/displays adequate comfort level or baseline comfort level  Description: Interventions:  - Encourage patient to monitor pain and request assistance  - Assess pain using appropriate pain scale  - Administer analgesics based on type and severity of pain and evaluate response  - Implement non-pharmacological measures as appropriate and evaluate response  - Consider cultural and social influences on pain and pain management  - Notify physician/advanced practitioner if interventions unsuccessful or patient reports new pain  Outcome: Progressing     Problem: INFECTION - ADULT  Goal: Absence or prevention of progression during hospitalization  Description: INTERVENTIONS:  - Assess and monitor for signs and symptoms of infection  - Monitor lab/diagnostic results  - Monitor all insertion sites, i e  indwelling lines, tubes, and drains  - Monitor endotracheal if appropriate and nasal secretions for changes in amount and color  - [place] appropriate cooling/warming therapies per order  - Administer medications as ordered  - Instruct and encourage patient and family to use good hand hygiene technique  - Identify and instruct in appropriate isolation precautions for identified infection/condition  Outcome: Progressing     Problem: DISCHARGE PLANNING  Goal: Discharge to home or other facility with appropriate resources  Description: INTERVENTIONS:  - Identify barriers to discharge w/patient and caregiver  - Arrange for needed discharge resources and transportation as appropriate  - Identify discharge learning needs (meds, wound care, etc )  - Arrange for interpretive services to assist at discharge as needed  - Refer to Case Management Department for coordinating discharge planning if the patient needs post-hospital services based on physician/advanced practitioner order or complex needs related to functional status, cognitive ability, or social support system  Outcome: Progressing

## 2022-08-28 PROCEDURE — 97112 NEUROMUSCULAR REEDUCATION: CPT

## 2022-08-28 PROCEDURE — 99232 SBSQ HOSP IP/OBS MODERATE 35: CPT | Performed by: INTERNAL MEDICINE

## 2022-08-28 PROCEDURE — 97530 THERAPEUTIC ACTIVITIES: CPT

## 2022-08-28 PROCEDURE — 97535 SELF CARE MNGMENT TRAINING: CPT

## 2022-08-28 RX ORDER — FLUCONAZOLE 150 MG/1
150 TABLET ORAL DAILY
Status: COMPLETED | OUTPATIENT
Start: 2022-08-28 | End: 2022-08-28

## 2022-08-28 RX ORDER — BENZONATATE 100 MG/1
200 CAPSULE ORAL 3 TIMES DAILY
Status: DISCONTINUED | OUTPATIENT
Start: 2022-08-28 | End: 2022-09-12

## 2022-08-28 RX ADMIN — BISACODYL 10 MG: 10 SUPPOSITORY RECTAL at 17:34

## 2022-08-28 RX ADMIN — DABIGATRAN ETEXILATE MESYLATE 150 MG: 150 CAPSULE ORAL at 08:19

## 2022-08-28 RX ADMIN — BACLOFEN 5 MG: 10 TABLET ORAL at 21:31

## 2022-08-28 RX ADMIN — MICONAZOLE NITRATE: KIT VAGINAL at 21:46

## 2022-08-28 RX ADMIN — MICONAZOLE NITRATE: KIT VAGINAL at 21:39

## 2022-08-28 RX ADMIN — NEBIVOLOL 5 MG: 5 TABLET ORAL at 08:23

## 2022-08-28 RX ADMIN — FLUCONAZOLE 150 MG: 150 TABLET ORAL at 13:14

## 2022-08-28 RX ADMIN — MENTHOL, METHYL SALICYLATE 1 APPLICATION: 10; 15 CREAM TOPICAL at 08:23

## 2022-08-28 RX ADMIN — BENZONATATE 200 MG: 100 CAPSULE ORAL at 15:31

## 2022-08-28 RX ADMIN — BACLOFEN 5 MG: 10 TABLET ORAL at 08:19

## 2022-08-28 RX ADMIN — BENZONATATE 200 MG: 100 CAPSULE ORAL at 21:32

## 2022-08-28 RX ADMIN — FAMOTIDINE 20 MG: 20 TABLET, FILM COATED ORAL at 08:19

## 2022-08-28 RX ADMIN — POLYETHYLENE GLYCOL 3350 17 G: 17 POWDER, FOR SOLUTION ORAL at 15:31

## 2022-08-28 RX ADMIN — BACLOFEN 5 MG: 10 TABLET ORAL at 15:31

## 2022-08-28 RX ADMIN — VERAPAMIL HYDROCHLORIDE 240 MG: 240 TABLET ORAL at 21:38

## 2022-08-28 RX ADMIN — FLUTICASONE PROPIONATE 1 SPRAY: 50 SPRAY, METERED NASAL at 08:22

## 2022-08-28 RX ADMIN — MAGNESIUM OXIDE TAB 400 MG (241.3 MG ELEMENTAL MG) 400 MG: 400 (241.3 MG) TAB at 08:19

## 2022-08-28 RX ADMIN — Medication 3 MG: at 21:31

## 2022-08-28 RX ADMIN — MENTHOL, METHYL SALICYLATE 1 APPLICATION: 10; 15 CREAM TOPICAL at 18:12

## 2022-08-28 RX ADMIN — LOSARTAN POTASSIUM 100 MG: 50 TABLET, FILM COATED ORAL at 08:19

## 2022-08-28 RX ADMIN — DABIGATRAN ETEXILATE MESYLATE 150 MG: 150 CAPSULE ORAL at 21:32

## 2022-08-28 RX ADMIN — BENZONATATE 100 MG: 100 CAPSULE ORAL at 08:19

## 2022-08-28 RX ADMIN — GUAIFENESIN 200 MG: 200 SOLUTION ORAL at 08:24

## 2022-08-28 RX ADMIN — PRAVASTATIN SODIUM 40 MG: 40 TABLET ORAL at 15:31

## 2022-08-28 NOTE — PROGRESS NOTES
Internal Medicine Progress Note  Patient: Raymond Gibson  Age/sex: 80 y o  female  Medical Record #: [id number]      ASSESSMENT/PLAN: (Interval History)  Raymond Gibson is seen and examined and management for following issues:    Acute embolic right MCA CVA  · Had near occlusion of right MCA  · Felt to be a Xarelto failure and was switched to Pradaxa  · ECHO w/o thrombus  · Continue statin  · On Baclofen for tone left arm = much improved     Chronic atrial fibrillation  · Sees Dr Matteo Mullins as OP  · Rates controlled  · On Pradaxa now, Xarelto stopped     PPM  · VVI  · Is not MRI conditional     Valvular disease  · Current ECHO:  LVEF 65%, mod AR/mild AS, mod TR with severely elevated RV systolic pressure, mild-mod MR, mod LAE/mild NEO  · Euvolemic currently     HTN  · Home:  Verapamil 240mg qhs/Cozaar 081 mg qd/Bystolic 5mg qd/lasix 14FE qd/Aldactone 25mg qd  · Here:  Verapamil 240mg qhs/Cozaar 716ZC qd/Bystolic 5mg qd  · OP note states has left RA stenosis but no testing in OP records or Care Everywhere to verify  · No changes again today     HLD  · Home:  Crestor 10mg qd = Neuro rec when discharged to reduce to 5mg qd  · Here:  Pravachol 40mg qd     DARRIUS  · S/p NS 1 liter   · Back to baseline  · Continue to hold Lasix and Aldactone for now     CAD  · Nonobstructive  · Card cath 8/2021 done for borderline abn stress test and found 50% mid RCA/40% mid LAD = no intervention done  · Continue statin  · Was not on ASA as an OP     Hypomagnesemia  · Takes here and at home 400 mg MagOx  · Mg level 8/20 was 2 4     Left chest pain  · Had left neck pain (not new for her) then pain under left breast = reproducible with palpation  · EKG was 100% v-paced but no obvious acute changes; trops negative Bengay applied  · At the time, felt likely M/S from spasm  She then had worsening pain in chest and described pressure so RR called    · Trops = 29/-, 28/-1, 28/-1  · Etio is her spasticity and Dr Brian elder Baclofen to 5mg TID and 5mg qhs prn which has helped but she is still having some left neck discomfort     Cough  · Have not witnessed her coughing but she endorses  · Has some clear-whitish secretions  · CXR negative   · Dr Shalini Alvarado added Pepcid in case etio of cough is 2/2 GERD  · Has prn Robitussin   · Added Flonase and scheduled Tessalon perles 100 mg TID   · Inc tessalon perles to 200mg TID today      Urine retention  · Getting str cath'd  · Management per PMR    Vaginal yeast infection  · Start Monistat pack and give 1 dose of Diflucan 150mg        DC planning: Reteam       The above assessment and plan was reviewed and updated as determined by my evaluation of the patient on 2022      Labs:   Results from last 7 days   Lab Units 22  0452 22  0458   WBC Thousand/uL 8 30 7 33   HEMOGLOBIN g/dL 11 6 12 0   HEMATOCRIT % 37 9 38 6   PLATELETS Thousands/uL 386 360     Results from last 7 days   Lab Units 22  0452 22  0458   SODIUM mmol/L 134* 134*   POTASSIUM mmol/L 4 5 4 7   CHLORIDE mmol/L 106 108   CO2 mmol/L 24 22   BUN mg/dL 32* 31*   CREATININE mg/dL 1 05 1 06   CALCIUM mg/dL 9 3 9 3                   Review of Scheduled Meds:  Current Facility-Administered Medications   Medication Dose Route Frequency Provider Last Rate    acetaminophen  650 mg Oral Q6H PRN Daisy Villegas MD      baclofen  5 mg Oral TID Brenda Carcamo MD      baclofen  5 mg Oral HS PRN Brenda Carcamo MD      benzonatate  100 mg Oral TID TOMY Jacobo      bisacodyl  10 mg Rectal Daily PRN Daisy Villegas MD      dabigatran etexilate  150 mg Oral Q12H [address] Daisy Villegas MD      famotidine  20 mg Oral Daily Brenda Carcamo MD      fluticasone  1 spray Each Nare Daily TOMY Jacobo      guaiFENesin  200 mg Oral Q6H PRN TOMY Jacobo      losartan  100 mg Oral Daily Daisy Villegas MD      magnesium oxide  400 mg Oral Daily Daisy Villegas MD      melatonin  3 mg Oral QPM Phani Higginbotham MD      menthol-methyl salicylate   Apply externally BID Phani Higginbotham MD      nebivolol  5 mg Oral Daily Didi Diaz MD      nitroglycerin  0 4 mg Sublingual Q5 Min PRN TOMY Joyner      polyethylene glycol  17 g Oral Daily PRN Didi Diaz MD      pravastatin  40 mg Oral Daily With Sylvie Clark MD      verapamil  240 mg Oral HS Didi Diaz MD         Subjective/ HPI: Patient seen and examined  Patients overnight issues or events were reviewed with nursing or staff during rounds or morning huddle session  New or overnight issues include the following:     Vaginal yeast infection noted today by nursing when straight cath'd  Offers no complaints    ROS:   A 10 point ROS was performed; negative except as noted above  *Labs /Radiology studies reviewed  *Medications reviewed and reconciled as needed  *Please refer to order section for additional ordered labs studies  *Case discussed with primary attending during morning huddle case rounds    Physical Examination:  Vitals:   Vitals:    08/27/22 1337 08/27/22 2107 08/28/22 0452 08/28/22 0754   BP: 128/68 125/55 (!) 98/48 132/61   BP Location: Right arm Left arm Right arm    Pulse: 70 69 60 66   Resp: 18 18 18    Temp: 98 1 °F (36 7 °C) 98 1 °F (36 7 °C) 97 7 °F (36 5 °C)    TempSrc:  Oral Oral    SpO2: 98% 93% 98%    Weight:       Height:           General Appearance: no distress, conversive  HEENT: PERRLA, conjuctiva normal; oropharynx clear; mucous membranes moist   Neck:  Supple, normal ROM  Lungs: CTA, normal respiratory effort, no retractions, expiratory effort normal  CV: regular rate and rhythm; no rubs/murmurs/gallops, PMI normal   ABD: soft; ND/NT; +BS  EXT: no edema  Skin: normal turgor, normal texture, no rashes  Psych: affect normal, mood normal  Neuro: AAO      The above physical exam was reviewed and updated as determined by my evaluation of the patient on 8/28/2022      Invasive Devices  Report None                    VTE Pharmacologic Prophylaxis: Pradaxa  Code Status: Level 1 - Full Code  Current Length of Stay: 9 day(s)      Total time spent:  30 minutes with more than 50% spent counseling/coordinating care  Counseling includes discussion with patient re: progress  and discussion with patient of his/her current medical state/information  Coordination of patient's care was performed in conjunction with primary service  Time invested included review of patient's labs, vitals, and management of their comorbidities with continued monitoring  In addition, this patient was discussed with medical team including physician and advanced extenders  The care of the patient was extensively discussed and appropriate treatment plan was formulated unique for this patient  ** Please Note:  voice to text software may have been used in the creation of this document   Although proof errors in transcription or interpretation are a potential of such software**

## 2022-08-28 NOTE — PROGRESS NOTES
08/28/22 1400   Pain Assessment   Pain Assessment Tool 0-10   Pain Score No Pain   Restrictions/Precautions   Precautions Bed/chair alarms;Cognitive; Fall Risk;Aspiration;Pain;Supervision on toilet/commode   Weight Bearing Restrictions No   ROM Restrictions No   Braces or Orthoses Other (Comment)  (multipodus LLE)   Cognition   Overall Cognitive Status Impaired   Arousal/Participation Alert; Cooperative   Attention Attends with cues to redirect   Orientation Level Oriented X4   Memory Decreased short term memory   Following Commands Follows one step commands with increased time or repetition   Sit to Lying   Type of Assistance Needed Physical assistance;Verbal cues   Physical Assistance Level 25% or less   Comment A with LLE   Sit to Lying CARE Score 3   Lying to Sitting on Side of Bed   Type of Assistance Needed Physical assistance;Verbal cues; Adaptive equipment   Physical Assistance Level 25% or less   Comment A to LLE with bed rail   Lying to Sitting on Side of Bed CARE Score 3   Sit to Stand   Type of Assistance Needed Physical assistance;Verbal cues; Adaptive equipment   Physical Assistance Level 26%-50%   Comment MOD VC's to lean fwd 2/2 retropulsion   Sit to Stand CARE Score 3   Bed-Chair Transfer   Type of Assistance Needed Physical assistance;Verbal cues; Adaptive equipment   Physical Assistance Level 51%-75%   Comment sit pivot, MOD VC's for sequencing and hand placement  Chair/Bed-to-Chair Transfer CARE Score 2   Transfer Bed/Chair/Wheelchair   Adaptive Equipment Roller Walker   Car Transfer   Reason if not Attempted Environmental limitations   Car Transfer CARE Score 10   Walk 10 Feet   Type of Assistance Needed Physical assistance;Verbal cues; Adaptive equipment   Physical Assistance Level Total assistance   Comment MODA HHA on R with CF, no A to clear foot this session  Walk 10 Feet CARE Score 1   Walk 50 Feet with Two Turns   Type of Assistance Needed Physical assistance;Verbal cues; Adaptive equipment   Physical Assistance Level Total assistance   Comment MODA HHA on R with CF, no A to clear foot this session  Walk 50 Feet with Two Turns CARE Score 1   Walk 150 Feet   Comment fatigued at 80'   Reason if not Attempted Safety concerns   Walk 150 Feet CARE Score 88   Walking 10 Feet on Uneven Surfaces   Reason if not Attempted Safety concerns   Walking 10 Feet on Uneven Surfaces CARE Score 88   Ambulation   Does the patient walk? 2  Yes   Primary Mode of Locomotion Prior to Admission Walk   Distance Walked (feet) 30 ft  (80', 90')   Assist Device Hand Hold   Gait Pattern Inconsistant Linda; Slow Linda;Decreased foot clearance;L foot drag; Forward Flexion;L hemiparesis; Narrow LUCY;Step to; Improper weight shift;Decreased L stance   Limitations Noted In Balance; Coordination; Endurance; Heel Strike;Midline Orientation;Posture; Safety;Speed;Strength;Swing   Provided Assistance with: Balance;Weight Shift;Trunk Support   Walk Assist Level Moderate Assist;Chair Follow   Findings A wht shifting with no AFO utilized this session, pt advanced LLE 75% of the time with full step  Wheel 50 Feet with Two Turns   Type of Assistance Needed Physical assistance   Physical Assistance Level 26%-50%   Comment veers L   Wheel 50 Feet with Two Turns CARE Score 3   Wheel 150 Feet   Type of Assistance Needed Physical assistance   Physical Assistance Level 26%-50%   Comment veers L   Wheel 150 Feet CARE Score 3   Wheelchair mobility   Does the patient use a wheelchair? 1  Yes   Type of Wheelchair Used 1   Manual   Method Right upper extremity;Right lower extremity   Assistance Provided For Locking Brakes;Obstacles;Remove Leg Rest;Replace Leg Rest;Remove armrests;Replace armrests   Distance Level Surface (feet) 150 ft   Curb or Single Stair   Reason if not Attempted Safety concerns   1 Step (Curb) CARE Score 88   4 Steps   Reason if not Attempted Activity not applicable   4 Steps CARE Score 9   12 Steps   Reason if not Attempted Activity not applicable   12 Steps CARE Score 9   Picking Up Object   Reason if not Attempted Safety concerns   Picking Up Object CARE Score 88   Therapeutic Interventions   Strengthening seated EOM STS 2 x5 reps working on ant wht shifts  Neuromuscular Re-Education NPP gait initially along R rail x30', then [de-identified]' and 90' with WC follow for safety  Modalities MHP to L shoulder post session as pt c/o increased tightness/soreness at night, x15 min, skin checked before and after  Other A to scooby pants while supine in bed, noted improved weight shifting to R to pull pants up  Equipment Use   NuStep x10 min with A to LUE for added support and to help break tone in B LUE and LLE  Assessment   Treatment Assessment Pt participated in skilled PT session with increased focus on NPP gait, increased ROM to LLE and improved functional transfer  Pt cont to be limited by retropulsion with stance, poor ability to weight shift and decreased LLE strength  Pt did however show improved use of LLE this session and did not require AFO or A to advance limb with gait  Pt was given cont VC's with gait ("kick") which seamed to help remind pt  Pt did have shortened step about 25%-30% of the time as she is easily distracted and lost focus quickly  Pt will cont to benefit from increased LE ROM, decreased LLE tone, increased LE strength, increased functional transfers and increased NPP gait to decrease burden of care  Cont POC as tolerated  Problem List Decreased strength;Decreased range of motion;Decreased endurance; Impaired balance;Decreased mobility; Decreased coordination;Decreased cognition; Impaired judgement;Decreased safety awareness; Impaired tone;Decreased skin integrity   Barriers to Discharge Decreased caregiver support; Inaccessible home environment   PT Barriers   Functional Limitation Car transfers;Stair negotiation;Transfers; Walking;Standing; Wheelchair management   Plan   Treatment/Interventions Functional transfer training;LE strengthening/ROM; Therapeutic exercise; Endurance training;Patient/family training;Bed mobility;Gait training   Progress Progressing toward goals   Recommendation   PT Discharge Recommendation Post acute rehabilitation services   PT Therapy Minutes   PT Time In 1400   PT Time Out 1530   PT Total Time (minutes) 90   PT Mode of treatment - Individual (minutes) 90   PT Mode of treatment - Concurrent (minutes) 0   PT Mode of treatment - Group (minutes) 0   PT Mode of treatment - Co-treat (minutes) 0   PT Mode of Treatment - Total time(minutes) 90 minutes   PT Cumulative Minutes 795   Therapy Time missed   Time missed?  No

## 2022-08-28 NOTE — PLAN OF CARE
Problem: MOBILITY - ADULT  Goal: Maintain or return to baseline ADL function  Description: INTERVENTIONS:  -  Assess patient's ability to carry out ADLs; assess patient's baseline for ADL function and identify physical deficits which impact ability to perform ADLs (bathing, care of mouth/teeth, toileting, grooming, dressing, etc )  - Assess/evaluate cause of self-care deficits   - Assess range of motion  - Assess patient's mobility; develop plan if impaired  - Assess patient's need for assistive devices and provide as appropriate  - Encourage maximum independence but intervene and supervise when necessary  - Involve family in performance of ADLs  - Assess for home care needs following discharge   - Consider OT consult to assist with ADL evaluation and planning for discharge  - Provide patient education as appropriate  Outcome: Progressing  Goal: Maintains/Returns to pre admission functional level  Description: INTERVENTIONS:  - Set and communicate daily mobility goal to care team and patient/family/caregiver  - Collaborate with rehabilitation services on mobility goals if consulted  - Perform Range of Motion 3 times a day  - Reposition patient every 2 hours    - Dangle patient 3 times a day  - Stand patient 3 times a day  - Ambulate patient 3 times a day  - Out of bed to chair 3 times a day   - Out of bed for meals 3 times a day  - Out of bed for toileting  - Record patient progress and toleration of activity level   Outcome: Progressing     Problem: Prexisting or High Potential for Compromised Skin Integrity  Goal: Skin integrity is maintained or improved  Description: INTERVENTIONS:  - Identify patients at risk for skin breakdown  - Assess and monitor skin integrity  - Assess and monitor nutrition and hydration status  - Monitor labs   - Assess for incontinence   - Turn and reposition patient  - Assist with mobility/ambulation  - Relieve pressure over bony prominences  - Avoid friction and shearing  - Provide appropriate hygiene as needed including keeping skin clean and dry  - Evaluate need for skin moisturizer/barrier cream  - Collaborate with interdisciplinary team   - Patient/family teaching  - Consider wound care consult   Outcome: Progressing     Problem: Potential for Falls  Goal: Patient will remain free of falls  Description: INTERVENTIONS:  - Educate patient/family on patient safety including physical limitations  - Instruct patient to call for assistance with activity   - Consult OT/PT to assist with strengthening/mobility   - Keep Call bell within reach  - Keep bed low and locked with side rails adjusted as appropriate  - Keep care items and personal belongings within reach  - Initiate and maintain comfort rounds  - Make Fall Risk Sign visible to staff  - Offer Toileting every 2-4 Hours, in advance of need  - Initiate/Maintain bed/chair alarm  - Obtain necessary fall risk management equipment: nonskid footwear  - Apply yellow socks and bracelet for high fall risk patients  - Consider moving patient to room near nurses station  Outcome: Progressing     Problem: PAIN - ADULT  Goal: Verbalizes/displays adequate comfort level or baseline comfort level  Description: Interventions:  - Encourage patient to monitor pain and request assistance  - Assess pain using appropriate pain scale  - Administer analgesics based on type and severity of pain and evaluate response  - Implement non-pharmacological measures as appropriate and evaluate response  - Consider cultural and social influences on pain and pain management  - Notify physician/advanced practitioner if interventions unsuccessful or patient reports new pain  Outcome: Progressing     Problem: INFECTION - ADULT  Goal: Absence or prevention of progression during hospitalization  Description: INTERVENTIONS:  - Assess and monitor for signs and symptoms of infection  - Monitor lab/diagnostic results  - Monitor all insertion sites, i e  indwelling lines, tubes, and drains  - Monitor endotracheal if appropriate and nasal secretions for changes in amount and color  - [place] appropriate cooling/warming therapies per order  - Administer medications as ordered  - Instruct and encourage patient and family to use good hand hygiene technique  - Identify and instruct in appropriate isolation precautions for identified infection/condition  Outcome: Progressing     Problem: DISCHARGE PLANNING  Goal: Discharge to home or other facility with appropriate resources  Description: INTERVENTIONS:  - Identify barriers to discharge w/patient and caregiver  - Arrange for needed discharge resources and transportation as appropriate  - Identify discharge learning needs (meds, wound care, etc )  - Arrange for interpretive services to assist at discharge as needed  - Refer to Case Management Department for coordinating discharge planning if the patient needs post-hospital services based on physician/advanced practitioner order or complex needs related to functional status, cognitive ability, or social support system  Outcome: Progressing

## 2022-08-28 NOTE — PROGRESS NOTES
08/28/22 0700   Pain Assessment   Pain Assessment Tool 0-10   Pain Score No Pain   Restrictions/Precautions   Precautions Bed/chair alarms;Aspiration;Supervision on toilet/commode; Fall Risk;Cognitive   Eating   Type of Assistance Needed Set-up / clean-up   Physical Assistance Level No physical assistance   Eating CARE Score 5   Oral Hygiene   Type of Assistance Needed Supervision   Physical Assistance Level No physical assistance   Comment seated in w/c sinkside, pt able to open and apply toothpaste to toothbrush   Oral Hygiene CARE Score 4   Grooming   Able To Initiate Tasks;Comb/Brush Hair;Wash/Dry Face;Brush/Clean Teeth;Wash/Dry Hands   Limitation Noted In Coordination; Safety;Strength   Findings seated sinkside w/set up   Shower/Bathe Self   Type of Assistance Needed Physical assistance   Physical Assistance Level 26%-50%   Comment [place] pt completed bathing upper body, RLE and periarea, assist to bathe LLE and buttocks in stance  Shower/Bathe Self CARE Score 3   Upper Body Dressing   Type of Assistance Needed Physical assistance   Physical Assistance Level 25% or less   Comment assist to negotiate LUE and material to thread into sleeve, pt able to pull shirt [place]  Upper Body Dressing CARE Score 3   Lower Body Dressing   Type of Assistance Needed Physical assistance   Physical Assistance Level 76% or more   Comment Pt able to thread RLE into pants, assist to thread LLE into pants and stabilize pt in stance, pt able to hike pants over Bhips and tie drawstring securley  Lower Body Dressing CARE Score 2   Putting On/Taking Off Footwear   Type of Assistance Needed Physical assistance   Physical Assistance Level 76% or more   Comment Pt educated on use of sock aid demo-ability to doff/scooby socks using sock aid stabilizing soft sock aid b/t legs to thread socks     Putting On/Taking Off Footwear CARE Score 2   Lying to Sitting on Side of Bed   Type of Assistance Needed Physical assistance   Physical Assistance Level 25% or less   Comment HOB elevated w/use of bed rails, pt able to ascend from bed and bring self to seated position swinging BLE to floor  Pt required assist to scootch Rhip to EOB in preparation for sit<>stand transfer  Lying to Sitting on Side of Bed CARE Score 3   Sit to Stand   Type of Assistance Needed Physical assistance   Physical Assistance Level 26%-50%   Sit to Stand CARE Score 3   Bed-Chair Transfer   Type of Assistance Needed Physical assistance   Physical Assistance Level 51%-75%   Comment Ax1 bed to chair, SPT   Chair/Bed-to-Chair Transfer CARE Score 2   Toileting Hygiene   Reason if not Attempted Refused to perform   [address] Score 7   Toilet Transfer   Reason if not Attempted Refused to perform   Toilet Transfer CARE Score 7   Assessment   Treatment Assessment Pt engaged in skilled 90 min OT session focusing on ADL re-training, safety awareness, fxl mobility and overall activity endurance improving indep in ADL/IADL's in preparation for safe d/c to home  See ADL details as noted above  Education provided to pt on use of AE for increased indep in LB dressing tasks  Pt demo-ability to doff/doff socks using soft sock-aid and retrieve socks from low surface using LHR engaging L-hand during fxl tasks during session  Increased flexor tone noted with stimuli such as when pt using AE or bathing/dressing self hindering UE extension during tasks  Recommend continued skilled OT focusing on POC in preparation for safe d/c to home  OT Therapy Minutes   OT Time In 0700   OT Time Out 0830   OT Total Time (minutes) 90   OT Mode of treatment - Individual (minutes) 90   OT Mode of treatment - Concurrent (minutes) 0   OT Mode of treatment - Group (minutes) 0   OT Mode of treatment - Co-treat (minutes) 0   OT Mode of Treatment - Total time(minutes) 90 minutes   OT Cumulative Minutes 600   Therapy Time missed   Time missed?  No

## 2022-08-29 LAB
ANION GAP SERPL CALCULATED.3IONS-SCNC: 4 MMOL/L (ref 4–13)
BASOPHILS # BLD AUTO: 0.06 THOUSANDS/ΜL (ref 0–0.1)
BASOPHILS NFR BLD AUTO: 1 % (ref 0–1)
BUN SERPL-MCNC: 29 MG/DL (ref 5–25)
CALCIUM SERPL-MCNC: 9.3 MG/DL (ref 8.3–10.1)
CHLORIDE SERPL-SCNC: 105 MMOL/L (ref 96–108)
CO2 SERPL-SCNC: 26 MMOL/L (ref 21–32)
CREAT SERPL-MCNC: 1.1 MG/DL (ref 0.6–1.3)
EOSINOPHIL # BLD AUTO: 0.07 THOUSAND/ΜL (ref 0–0.61)
EOSINOPHIL NFR BLD AUTO: 1 % (ref 0–6)
ERYTHROCYTE [DISTWIDTH] IN BLOOD BY AUTOMATED COUNT: 14.2 % (ref 11.6–15.1)
GFR SERPL CREATININE-BSD FRML MDRD: 46 ML/MIN/1.73SQ M
GLUCOSE P FAST SERPL-MCNC: 83 MG/DL (ref 65–99)
GLUCOSE SERPL-MCNC: 83 MG/DL (ref 65–140)
HCT VFR BLD AUTO: 36.3 % (ref 34.8–46.1)
HGB BLD-MCNC: 11.3 G/DL (ref 11.5–15.4)
IMM GRANULOCYTES # BLD AUTO: 0.05 THOUSAND/UL (ref 0–0.2)
IMM GRANULOCYTES NFR BLD AUTO: 1 % (ref 0–2)
LYMPHOCYTES # BLD AUTO: 1.79 THOUSANDS/ΜL (ref 0.6–4.47)
LYMPHOCYTES NFR BLD AUTO: 18 % (ref 14–44)
MCH RBC QN AUTO: 29.7 PG (ref 26.8–34.3)
MCHC RBC AUTO-ENTMCNC: 31.1 G/DL (ref 31.4–37.4)
MCV RBC AUTO: 96 FL (ref 82–98)
MONOCYTES # BLD AUTO: 0.8 THOUSAND/ΜL (ref 0.17–1.22)
MONOCYTES NFR BLD AUTO: 8 % (ref 4–12)
NEUTROPHILS # BLD AUTO: 7.44 THOUSANDS/ΜL (ref 1.85–7.62)
NEUTS SEG NFR BLD AUTO: 71 % (ref 43–75)
NRBC BLD AUTO-RTO: 0 /100 WBCS
PLATELET # BLD AUTO: 317 THOUSANDS/UL (ref 149–390)
PMV BLD AUTO: 10.4 FL (ref 8.9–12.7)
POTASSIUM SERPL-SCNC: 4.3 MMOL/L (ref 3.5–5.3)
RBC # BLD AUTO: 3.8 MILLION/UL (ref 3.81–5.12)
SODIUM SERPL-SCNC: 135 MMOL/L (ref 135–147)
WBC # BLD AUTO: 10.21 THOUSAND/UL (ref 4.31–10.16)

## 2022-08-29 PROCEDURE — 97530 THERAPEUTIC ACTIVITIES: CPT

## 2022-08-29 PROCEDURE — 99232 SBSQ HOSP IP/OBS MODERATE 35: CPT | Performed by: PHYSICAL MEDICINE & REHABILITATION

## 2022-08-29 PROCEDURE — 97535 SELF CARE MNGMENT TRAINING: CPT

## 2022-08-29 PROCEDURE — 97112 NEUROMUSCULAR REEDUCATION: CPT

## 2022-08-29 PROCEDURE — 97116 GAIT TRAINING THERAPY: CPT

## 2022-08-29 PROCEDURE — 97110 THERAPEUTIC EXERCISES: CPT

## 2022-08-29 PROCEDURE — 85025 COMPLETE CBC W/AUTO DIFF WBC: CPT | Performed by: NURSE PRACTITIONER

## 2022-08-29 PROCEDURE — 80048 BASIC METABOLIC PNL TOTAL CA: CPT | Performed by: NURSE PRACTITIONER

## 2022-08-29 PROCEDURE — 99232 SBSQ HOSP IP/OBS MODERATE 35: CPT | Performed by: INTERNAL MEDICINE

## 2022-08-29 RX ORDER — BACLOFEN 10 MG/1
5 TABLET ORAL 4 TIMES DAILY
Status: DISCONTINUED | OUTPATIENT
Start: 2022-08-29 | End: 2022-09-05

## 2022-08-29 RX ADMIN — FLUTICASONE PROPIONATE 1 SPRAY: 50 SPRAY, METERED NASAL at 09:03

## 2022-08-29 RX ADMIN — Medication 3 MG: at 20:19

## 2022-08-29 RX ADMIN — MENTHOL, METHYL SALICYLATE 1 APPLICATION: 10; 15 CREAM TOPICAL at 09:03

## 2022-08-29 RX ADMIN — VERAPAMIL HYDROCHLORIDE 240 MG: 240 TABLET ORAL at 21:19

## 2022-08-29 RX ADMIN — MICONAZOLE NITRATE 200 MG: 200 SUPPOSITORY VAGINAL at 21:29

## 2022-08-29 RX ADMIN — BACLOFEN 5 MG: 10 TABLET ORAL at 09:01

## 2022-08-29 RX ADMIN — BENZONATATE 200 MG: 100 CAPSULE ORAL at 17:13

## 2022-08-29 RX ADMIN — MAGNESIUM OXIDE TAB 400 MG (241.3 MG ELEMENTAL MG) 400 MG: 400 (241.3 MG) TAB at 09:01

## 2022-08-29 RX ADMIN — FAMOTIDINE 20 MG: 20 TABLET, FILM COATED ORAL at 09:01

## 2022-08-29 RX ADMIN — BENZONATATE 200 MG: 100 CAPSULE ORAL at 20:19

## 2022-08-29 RX ADMIN — DABIGATRAN ETEXILATE MESYLATE 150 MG: 150 CAPSULE ORAL at 09:00

## 2022-08-29 RX ADMIN — BACLOFEN 5 MG: 10 TABLET ORAL at 17:13

## 2022-08-29 RX ADMIN — BENZONATATE 200 MG: 100 CAPSULE ORAL at 09:00

## 2022-08-29 RX ADMIN — LOSARTAN POTASSIUM 100 MG: 50 TABLET, FILM COATED ORAL at 09:02

## 2022-08-29 RX ADMIN — NEBIVOLOL 5 MG: 5 TABLET ORAL at 09:03

## 2022-08-29 RX ADMIN — DABIGATRAN ETEXILATE MESYLATE 150 MG: 150 CAPSULE ORAL at 20:19

## 2022-08-29 RX ADMIN — PRAVASTATIN SODIUM 40 MG: 40 TABLET ORAL at 17:13

## 2022-08-29 RX ADMIN — BACLOFEN 5 MG: 10 TABLET ORAL at 21:20

## 2022-08-29 NOTE — PLAN OF CARE
Problem: INFECTION - ADULT  Goal: Absence or prevention of progression during hospitalization  Description: INTERVENTIONS:  - Assess and monitor for signs and symptoms of infection  - Monitor lab/diagnostic results  - Monitor all insertion sites, i e  indwelling lines, tubes, and drains  - Monitor endotracheal if appropriate and nasal secretions for changes in amount and color  - [place] appropriate cooling/warming therapies per order  - Administer medications as ordered  - Instruct and encourage patient and family to use good hand hygiene technique  - Identify and instruct in appropriate isolation precautions for identified infection/condition  Outcome: Progressing

## 2022-08-29 NOTE — PROGRESS NOTES
OT Daily Treatment Note       08/29/22 1300   Pain Assessment   Pain Assessment Tool 0-10   Pain Score No Pain   Restrictions/Precautions   Precautions Bed/chair alarms;Cognitive; Fall Risk;Pain;Supervision on toilet/commode   Braces or Orthoses Other (Comment)  (multipodus LLE (pt refused to wear at end of session today))   Lifestyle   Autonomy "I work hard because I want to go home"   Sit to Lying   Type of Assistance Needed Physical assistance   Physical Assistance Level 25% or less   Comment assist with evelated LLE onto bed   Sit to Lying CARE Score 3   Lying to Sitting on Side of Bed   Type of Assistance Needed Physical assistance   Physical Assistance Level 51%-75%   Comment Mod A to transfer out of L side of bed with use of bed rail for UE support   Pt demonstrates increased spasticity during transfer, making it difficult for her to complete without physical assistance   Lying to Sitting on Side of Bed CARE Score 2   Sit to Stand   Type of Assistance Needed Physical assistance   Physical Assistance Level 51%-75%   Comment mod A with BL UE on bed rail for UE support and L knee block - when attempting to complete with HHA, pt required Max A with severe retropulsion making transfer unsafe to complete x1 person   Sit to Stand CARE Score 2   Bed-Chair Transfer   Type of Assistance Needed Physical assistance   Physical Assistance Level 51%-75%   Comment sit pivot from bed <> WC without an AD  pt transfers more consistently at min/mod A when transferring to R   Chair/Bed-to-Chair Transfer CARE Score 2   Toilet Transfer   Type of Assistance Needed Physical assistance   Physical Assistance Level 76% or more   Comment Mod/Max A x1 sit pivot transfer from WC > BSC with use of grab bar as UE support   Toilet Transfer CARE Score 2   Functional Standing Tolerance   Time 2 mins x 5   Comments completed functional standing tolerance task with BL UE on bed rails as UE support focusing on weight shifting left to right with right reaction when experiencing LOB laterally to the L  req min/mod A in stance with vc to shift weight onto L side for > 30 seconds   Neuromuscular Education   Trunk Control completed functional reach task at [facility] focusing on maintaining trunk control and RUE ROM in all quadrants  Pt completed with CS and intermittent CG when experiencing L lateral LOB  cued pt to self correct which she was able to do a < min A (just a minor tap towards the R side to assist with initiating her posture)   Cognition   Overall Cognitive Status Impaired   Arousal/Participation Alert; Cooperative   Attention Attends with cues to redirect   Orientation Level Oriented X4   Memory Decreased short term memory   Following Commands Follows one step commands with increased time or repetition   Activity Tolerance   Activity Tolerance Patient tolerated treatment well   Assessment   Treatment Assessment Pt completed 90 minute OT session focusing on functional transfer training, static and dynamic sitting balance and discussion of DC planning  See flowsheet for details regarding session  Briefly spoke with pt regarding DC planning  Pt stated that her son is hoping to get 2-3 home health aides every day to get as close to 24 hr assistance as possible  Pt stated that if having round the clock care is not possible, the next option would be assisted living but she is concerned about the financial aspect of nursing home  Pt stated that under no circumstance will she go to SNF as her  was in a SNF [facility] and she can "tell horror stories about that place"  OT educated pt that we will continue to maximize her function here at the [facility] but if she is unable to have 24 hr care at home and nursing home is not financially feasible then [facility] may be the next best option  Pt stated her and her son will "cross that bridge when they get there"  Pt continues to be limited by her LUE weakness, spasticity, coordination   Pt is also limited by her dynamic and static sitting/standing balance  Pt would benefit from continued OT services to increase independence and safety with ADL completion  Prognosis Fair   Problem List Decreased strength;Decreased range of motion;Decreased endurance; Impaired balance;Decreased mobility; Decreased coordination;Decreased cognition; Impaired tone   Barriers to Discharge Inaccessible home environment;Decreased caregiver support   Plan   Treatment/Interventions ADL retraining;Functional transfer training; Therapeutic exercise; Endurance training;Cognitive reorientation;Patient/family training;Equipment eval/education; Compensatory technique education   Progress Progressing toward goals   Recommendation   OT Discharge Recommendation   (pending progress)   OT Therapy Minutes   OT Time In 1300   OT Time Out 1430   OT Total Time (minutes) 90   OT Mode of treatment - Individual (minutes) 90   OT Mode of treatment - Concurrent (minutes) 0   OT Mode of treatment - Group (minutes) 0   OT Mode of treatment - Co-treat (minutes) 0   OT Mode of Treatment - Total time(minutes) 90 minutes   OT Cumulative Minutes 730   Therapy Time missed   Time missed?  No

## 2022-08-29 NOTE — PROGRESS NOTES
[facility] Occupational Therapy Daily Note  Patient Active Problem List   Diagnosis    Other chest pain    Essential hypertension    Hyperlipidemia LDL goal <100    Stage 3 chronic kidney disease (HCC)    Osteoarthritis of both wrists    Chronic atrial fibrillation (HCC)    Tubulovillous adenoma    Gastroesophageal reflux disease without esophagitis    Pacemaker    B12 deficiency    Allergic rhinitis due to pollen    Cavus deformity of foot    Midline cystocele    Hallux abducto valgus, bilateral    Left renal artery stenosis (HCC)    Osteoarthritis of hip    Osteopenia    Pain due to onychomycosis of toenails of both feet    Left atrial enlargement    Lipoma of right upper extremity    Diverticulosis of colon    Abnormal nuclear stress test    Renal insufficiency    Arterial ischemic stroke, MCA (middle cerebral artery), right, acute ([address] )    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 ([address] )    Anemia    At risk for venous thromboembolism (VTE)    DARRIUS (acute kidney injury) ([address] )    Spastic hemiparesis of left dominant side as late effect of cerebral infarction ([address] )    Valvular heart disease    CAD (coronary artery disease)    Urinary retention    Neck pain    Healthcare maintenance    At risk for coping difficulty       Past Medical History:   Diagnosis Date    A-fib (HCC)     Anemia     Arthritis     Breast pain, right     Chest pain on breathing     Colon polyp     Cough     Diverticulosis     Encounter for screening colonoscopy     H/O sick sinus syndrome     Heart murmur     High cholesterol     History of atrial fibrillation     Rate ontrolled  On xarelto 15mg (creatinine 50) Followed by Dr Bryan German with Cardiology     History of weight loss     Report loose fitting clothes  Weight stable (3lbs difference)  Last colo showed small tubular adenoma x2  Breast biopsy last showed fibrocystic changes  TSH wnl  Will check cbc, bmp, spep          Hx of long term use of blood thinners     Hypertension Irregular heart beat     Pacemaker     Preop examination     Shortness of breath     Viral bronchitis      Etiologic Diagnosis: right mca ischemic cva  Restrictions/Precautions  Precautions: Bed/chair alarms, Aspiration, Supervision on toilet/commode, Fall Risk, Cognitive  Weight Bearing Restrictions: No  ROM Restrictions: No  Braces or Orthoses:  (L AFO, multipodus boot LLE after session)  ADL Team Goal: Patient will be independent with ADLs with least restrictive device upon completion of rehab program  Occupational Therapy LTG's  Eating Oral care Bathing LB dress UB dress   Eating Goal: 06  Independent - Patient completes the activity by him/herself with no assistance from a helper  Oral Hygiene Goal: 06  Independent - Patient completes the activity by him/herself with no assistance from a helper  Shower/bathe self Goal: 06  Independent - Patient completes the activity by him/herself with no assistance from a helper  Lower body dressing Goal: 06  Independent - Patient completes the activity by him/herself with no assistance from a helper  Upper body dressing Goal: 06  Independent - Patient completes the activity by him/herself with no assistance from a helper  Toileting Toilet txf Func txf IADL Med    Toileting hygiene Goal: 06  Independent - Patient completes the activity by him/herself with no assistance from a helper  Toilet transfer Goal: 06  Independent - Patient completes the activity by him/herself with no assistance from a helper           OT interventions: Treatment/Interventions: ADL retraining, Functional transfer training, Therapeutic exercise, Endurance training, Cognitive reorientation, Patient/family training, Equipment eval/education, Bed mobility, Compensatory technique education, Spoke to nursing  Discharge Plan:  OT Discharge Recommendation:  (pending progress)   DME: [facility]     08/29/22 0810   Pain Assessment   Pain Assessment Tool 0-10   Pain Score No Pain   Lifestyle   Autonomy "Its getting better "   Shower/Bathe Self   Type of Assistance Needed Physical assistance   Physical Assistance Level 26%-50%   Comment sponge bathe at sink  pt able to complete UB bathing with improved L UE control and AROM  Pt able to compelte static stance with unilateral UE release and MIN A gusrding L side  Unable to reach buttocks fully with L UE, transfer to R UE for completion  assist for feet  Shower/Bathe Self CARE Score 3   Upper Body Dressing   Type of Assistance Needed Physical assistance   Physical Assistance Level 25% or less   Comment slight assist for shirt over L shoudler   Upper Body Dressing CARE Score 3   Lower Body Dressing   Type of Assistance Needed Physical assistance   Physical Assistance Level 76% or more   Comment Pt able to complete b/l UE relase in stance with GERTRUDIS balance correction to use UE for donning pants over hips  Lower Body Dressing CARE Score 2   Putting On/Taking Off Footwear   Type of Assistance Needed Physical assistance   Physical Assistance Level Total assistance   Putting On/Taking Off Footwear CARE Score 1   Lying to Sitting on Side of Bed   Type of Assistance Needed Physical assistance   Physical Assistance Level 51%-75%   Comment EXit to L side of bed for cont promotion of L side attention and L UE weight bearing and motor control  Still cont to be limited by spasticity when attempts to intiaite LE movements to EOB, Poor hip advancement at EOB  Lying to Sitting on Side of Bed CARE Score 2   Sit to Stand   Type of Assistance Needed Physical assistance   Physical Assistance Level 26%-50%   Sit to Stand CARE Score 3   Bed-Chair Transfer   Type of Assistance Needed Physical assistance   Physical Assistance Level 51%-75%   Comment MOD A SPT to R side to WC  Chair/Bed-to-Chair Transfer CARE Score 2   Toileting Hygiene   Type of Assistance Needed Physical assistance   Physical Assistance Level 76% or more   Comment Pt completing toileting on BSC   MOD A support in stance for L LE weight bearing fo partial stance for hygiene with L UE   Toileting Hygiene CARE Score 2   Toilet Transfer   Type of Assistance Needed Physical assistance   Physical Assistance Level 51%-75%   Comment MOD A SPT w/ GRab bar use to [facility] over toilet  updated transfer status board for [employer] w/ Ax2   Toilet Transfer CARE Score 2   Assessment   Treatment Assessment aPt participated in skilled OT session focusing on functional ADL retraining with focus on L UE NMR forced use in tasks  Pt demo improved AROM with ability to extinguish elbow flexion during functional tasks  Improvements in functional transfers with ability to utilize L UE in transfers now  See above for details on ADL function  Pt continues to demonstrate great improvements in functional performance  Prognosis Fair   Problem List Decreased strength;Decreased range of motion;Decreased endurance; Impaired balance;Decreased mobility; Decreased coordination;Decreased cognition; Impaired tone   Plan   Progress Progressing toward goals   OT Therapy Minutes   OT Time In 0810   OT Time Out 0850   OT Total Time (minutes) 40   OT Mode of treatment - Individual (minutes) 40   OT Mode of treatment - Concurrent (minutes) 0   OT Mode of treatment - Group (minutes) 0   OT Mode of treatment - Co-treat (minutes) 0   OT Mode of Treatment - Total time(minutes) 40 minutes   OT Cumulative Minutes 640

## 2022-08-29 NOTE — CASE MANAGEMENT
Clinical update sent via [employer] to Ayan Box at [facility] at [phone] requesting an additional 7 days coverage  Awaiting determination

## 2022-08-29 NOTE — PROGRESS NOTES
Physical Medicine and Rehabilitation Progress Note  Jm Morales 80 y o  female MRN: [id number]  Unit/Bed#: -40 Encounter: [id number]    HPI: 25-year-old female with a past medical history of AFib recently on Xarelto, sick sinus syndrome status status post pacemaker placement that is not MRI compatible, hypertension, hyperlipidemia, valvular disease, coronary artery disease developed dense left-sided weakness, dysarthria and presented to the hospital on 08/12/2022  She had systolic blood pressure of 200 on arrival   CT head showed focal area of gliosis within the high right post central gyrus and scattered chronic microvascular ischemic changes with more focal lucency along the anterior limb of the right internal capsule  CTA of head and neck showed near occlusive thrombus at the right MCA bifurcation, mild beating of the mid to distal ICA suspicious mild fibromuscular dysplasia and mild atherosclerotic disease at the bilateral distal carotid arteries  She was not a candidate for MRI due to pacemaker  CT cerebral perfusion study showed 8 mL of mismatch  She was not a candidate for tPA due to bleeding risk from being on Xarelto  She was not a thrombectomy candidate for neurointerventional   Patient was recommended to transition to Pradaxa  Patient was evaluated by skilled therapies and was found to have significant decline in ADLs and ambulation and appears appropriate for admission to [place] [address]  Chief Complaint: No new issues  Interval History/Subjective:  No acute events overnight  Still with annoying cough  Some minimal white ouput  Doesn't feel that fluticasone really is helping and it's irritating to her  Her spasticity has improved - AROM in her shoulder flexion/scaption particularly  Denies any new CP, Sob, fevers, chills, N/V, abdominal pain  Last BM 8/28  ROS:  A 10 point review of systems was negative except for what is noted in the HPI      Today's Changes:  1  Increased Baclofen to QID   - Discussed botox with patient and family, they are open to trying it while she is inpatient   - Reached out to Neri Kaplan with [employer] to see if we can arrange for a sample to be brought to the hospital  Discussed with medical director of unit  - They state that they cannot bring samples  May ask pharmacy  Reached out to Pharmacy manager  2  Stopped fluticasone, added throat spray  3  Still requiring bladder scan/cath protocol  May start tamsulosin  Total visit time: 25 minutes, with more than 50% spent counseling/coordinating care  Counseling includes discussion with patient re: progress in therapies, functional issues observed by therapy staff, and discussion with patient regarding their current medical state and wellbeing  Coordination of patient's care was performed in conjunction with Internal Medicine service to monitor patient's labs, vitals, and management of their comorbidities  Assessment/Plan:    * Arterial ischemic stroke, MCA (middle cerebral artery), right, acute (HCC)  Assessment & Plan  - R MCA CVA   - CTA head/neck - near occlusive thrombus at the R MCA bifurcation, mild beading of the mid to distal ICAs suspicious for mild fibromuscular dysplasia, and mild atherosclerotic disease at bilateral distal carotid arteries     - no MRI because pacemaker not compatible    - Residual impairments on admission to [facility]: L spastic hemiparesis, bowel/bladder dysfunction, possible cog impairments (assess for other impairments during [facility] course)     Secondary stroke prevention  - repeat CT angio head and neck in 3 months to re-evaluate thrombus is a outpatient  - Antithrombotic: Pradaxa (switched from Xarelto for possible failure)   - Statin  - Optimal management of blood pressure and diabetes  - Patient at increased risk for stroke particularly early in post-stroke period - monitor neuro exam closely with low threshold to repeat imaging  -  patient and if applicable caregiver on optimal stroke management  - Follow-up with neurology and PCP after d/c   - Recommend acute comprehensive interdisciplinary inpatient rehabilitation to include intensive skilled therapies (PT, OT, ST) as outlined with oversight and management by rehabilitation physician as well as inpatient rehab level nursing, case management and weekly interdisciplinary team meetings  - patient does have fair left hand and wrist strength; can use resting hand splint for a few hours during the day and overnight but to not overuse and encourage use hand and wrist  - multi Podus boot on left foot patient can breaks to potentially decrease risk of contracture/skin breakdown   - patient has spastic left upper extremity making subluxation less likely although continue subluxation precautions      At risk for coping difficulty  Assessment & Plan  Supportive counseling  Psychology consult while in [facility]  Discharged on magnesium daily   Continue for now  Monitor level  Neck pain  Assessment & Plan  With taut muscle bands in left cervical paraspinal and trapezius areas  Gentle stretching  Baclofen 5 mg QID  Monitor closely given GFR  PRN APAP  Consider K-pad or other topicals    Urinary retention  Assessment & Plan  Was using a purewick  On 8/25 was noted not to have any urine output overnight by nursing    - Bladder scan was >500cc  Ultimately catheterized for 1600cc  - UA negative   - Starting Q6hr scan/cath program  Volumes appropriate on this, but still needing catheterization on occasion    - Consider starting tamsulosin    - monitor for retention, incontinence, signs/symptoms of UTI  - ensure optimal bowel management   - recommend toileting program Q2-4 H during day and Q4-6H overnight   - bladder scan Q6H, monitor output and PVRs, straight cath >450 or if uncomfortable 250-449      CAD (coronary artery disease)  Assessment & Plan  IM consulted and with overall management at their discretion during [facility] course  Antithrombotic: Pradaxa  Statin  Optimal blood pressure   Outpatient Cardiology follow-up    Per IM  · "Nonobstructive  · Card cath 8/2021 done for borderline abn stress test and found 50% mid RCA/40% mid LAD = no intervention done  · Continue statin  · Was not on ASA as an OP"       Valvular heart disease  Assessment & Plan  Per IM  · "Current ECHO:  LVEF 65%, mod AR/mild AS, mod TR with severely elevated RV systolic pressure, mild-mod MR, mod LAE/mild NEO  · Euvolemic currently but will watch since CTA on admission showed pulm edema and b/l pleural effusions"  -diuretics at their discretion   -Outpatient cardiology follow-up    Spastic hemiparesis of left dominant side as late effect of cerebral infarction [facility])  Assessment & Plan  Left upper extremity significantly (also some L neck tightness/spasms)  Modified Sada scale 3 in shoulder, and 2 in her elbow  - Has flexion synergy    - Has clonus in pronators and wrist flexors  Baclofen 5mg increased to QID  Monitor given GFR  May be more sleepy initially  Reached out to [employer] rep to see if we can get samples for inpatient administration of botulinum toxin   - 75 units lateral pec, 75 units biceps, 25 units FCR, 25 units pronator teres for total 200 units     - Have not been able to get Botox inpatient     Gentle range of motion with therapy  Patient does have movement of the hand and wrist and do want to encourage that - can still consider resting hand splint intermittently  Skilled PT OT  Optimal skin hygiene    DARRIUS (acute kidney injury) [facility])  Assessment & Plan  Resolved  8/29 Crea 1 1  Medicine consulted  Hold Lasix and Aldactone  Resume diuretics at discretion of medicine  Monitor labs and output    At risk for venous thromboembolism (VTE)  Assessment & Plan  SCDs, ambulation, and Pradaxa       Pacemaker  Assessment & Plan  Not MRI compatible    Chronic atrial fibrillation (HCC)  Assessment & Plan  IM consulted and with overall management at their discretion during ARC course  Rate control: Verapamil, Nebivolol   Antithrombotic: Pradaxa       Stage 3 chronic kidney disease [facility])  Assessment & Plan  Lab Results   Component Value Date    EGFR 46 08/29/2022    EGFR 49 08/25/2022    EGFR 49 08/22/2022    CREATININE 1 10 08/29/2022    CREATININE 1 05 08/25/2022    CREATININE 1 06 08/22/2022     Given IVF  Crea now back down to ~1 06 near baseline  Avoid nephrotoxic meds  Monitor voiding status  IM consulted  Hyperlipidemia LDL goal <100  Assessment & Plan  Statin    Essential hypertension  Assessment & Plan  Internal medicine consulted and co-management with their service  Monitor vitals with and without activity; monitor for orthostasis  Monitor hemoglobin, electrolytes, kidney function, hydration status   Current meds:  Losartan, nebivolol, Verapamil 240mg daily  - Lasix and Aldactone on hold      Other chest pain  Assessment & Plan  Resolved  8/25 with chest pain, ultimately felt to be 2/2 to spasticity  Improved with baclofen/robaxin  - delta hsTrop at 2 hr was -1    - EKG showed paced rhythm, no evidence of ischemia   - CXR unremarkable   - 30 min later had another episode that felt more like pressure  Rapid called, but not concerning for ACS  Generally reproducible   - Increased Baclofen and frequency of Shearon Comp  Has a cough with it, and lungs sound good, and CXR unremarkable  - May also be component of GERD  Started on pepcid  Also on throat spray  Fluticasone was irritating and didn't make a difference    - Having SLP evaluate swallow for cough      Health Maintenance  #Delirium/Sleep: At risk  Environmental interventions  Optimize pain, bowel, bladder, sleep-wake cycle management  #Pain: Tylenol PRN  #Bowel: Last BM 8/28 and continent  Not on regimen - only PRNs  #Skin/Pressure Injury Prevention: Turn Q2hr in bed, with weight shifts [id number] in wheelchair    #DVT Prophylaxis: Fully anticoagulated on pradaxa  #GI Prophylaxis: PO diet   #Code Status: Full Code  #FEN: Reg/Thins  #Dispo: Team Conference 8/23: ELOS 3-4 weeks  Plan to reteam      Objective:    Functional Update: making good progress particularly over the past weekend   PT: maxA transfers, mod-maxA bed mobility, totalA x2 ambulation 30', modA wheelchair mobility 150'   OT: Sup eating, maxA grooming, Ax2 bathing, maxA UB dressing, Ax2 LB dressing, Ax2 toileting and toilet transfers  SLP: mild cognitive impairment  Allergies per EMR    Physical Exam:  Temp:  [97 7 °F (36 5 °C)-98 °F (36 7 °C)] 97 7 °F (36 5 °C)  HR:  [61-64] 62  Resp:  [16-20] 20  BP: (111-153)/(56-69) 153/69  Oxygen Therapy  SpO2: 97 %    Gen: No acute distress, Well-nourished, well-appearing  HEENT: Moist mucus membranes, Normocephalic/Atraumatic  Cardiovascular: Regular rate, rhythm, S1/S2  Distal pulses palpable  Heme/Extr: No edema  Pulmonary: Non-labored breathing  Lungs CTAB  : No contreras  GI: Soft, non-tender, non-distended  BS+  MSK: Markedly improved shoulder flexion/scaption while still quite limited in abduction  Tightness in lateral pec  Improved EF/Wrist and finger motion  Integumentary: Skin is warm, dry  Neuro: AAOx3, CN 2-12 intact  L hemiparesis  Voluntary strength improving  Fighting against spasticity and flexion synergy pattern as well as with clonus in wrist flexors and pronators  Psych: Normal mood and affect  Diagnostic Studies: Reviewed, no new imaging      Laboratory:  Reviewed   Results from last 7 days   Lab Units 08/29/22  0537 08/25/22  0452   HEMOGLOBIN g/dL 11 3* 11 6   HEMATOCRIT % 36 3 37 9   WBC Thousand/uL 10 21* 8 30     Results from last 7 days   Lab Units 08/29/22  0537 08/25/22  0452   BUN mg/dL 29* 32*   POTASSIUM mmol/L 4 3 4 5   CHLORIDE mmol/L 105 106   CREATININE mg/dL 1 10 1 05            Patient Active Problem List   Diagnosis    Other chest pain    Essential hypertension    Hyperlipidemia LDL goal <100  Stage 3 chronic kidney disease (HCC)    Osteoarthritis of both wrists    Chronic atrial fibrillation (HCC)    Tubulovillous adenoma    Gastroesophageal reflux disease without esophagitis    Pacemaker    B12 deficiency    Allergic rhinitis due to pollen    Cavus deformity of foot    Midline cystocele    Hallux abducto valgus, bilateral    Left renal artery stenosis (HCC)    Osteoarthritis of hip    Osteopenia    Pain due to onychomycosis of toenails of both feet    Left atrial enlargement    Lipoma of right upper extremity    Diverticulosis of colon    Abnormal nuclear stress test    Renal insufficiency    Arterial ischemic stroke, MCA (middle cerebral artery), right, acute (HCC)    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)    Anemia    At risk for venous thromboembolism (VTE)    DARRIUS (acute kidney injury) ([facility] [address] )    Spastic hemiparesis of left dominant side as late effect of cerebral infarction ([facility] [address] )    Valvular heart disease    CAD (coronary artery disease)    Urinary retention    Neck pain    Healthcare maintenance    At risk for coping difficulty         Medications  Current Facility-Administered Medications   Medication Dose Route Frequency Provider Last Rate    acetaminophen  650 mg Oral Q6H PRN Meredith Soriano MD      baclofen  5 mg Oral TID Marla Calle MD      baclofen  5 mg Oral HS PRN Marla Calle MD      benzonatate  200 mg Oral TID TOMY Davies      bisacodyl  10 mg Rectal Daily PRN Meredith Soriano MD      dabigatran etexilate  150 mg Oral Q12H [facility] Meredith Soriano MD      famotidine  20 mg Oral Daily Marla Calle MD      guaiFENesin  200 mg Oral Q6H PRN TOMY Davies      losartan  100 mg Oral Daily Meredith Soriano MD      magnesium oxide  400 mg Oral Daily Meredith Soriano MD      melatonin  3 mg Oral QPM Marla Calle MD      menthol-methyl salicylate   Apply externally BID MD Erica Yen miconazole   Topical BID PRN TOMY Calvo      And    miconazole  200 mg Vaginal HS TOMY Castro      nebivolol  5 mg Oral Daily David Daniel MD      nitroglycerin  0 4 mg Sublingual Q5 Min PRN TOMY Silvestre      phenol  1 spray Mouth/Throat Q2H PRN Valerie Wu MD      polyethylene glycol  17 g Oral Daily PRN David Daniel MD      pravastatin  40 mg Oral Daily With Sanket Gaines MD      verapamil  240 mg Oral HS David Daniel MD            ** Please Note: Fluency Direct voice to text software may have been used in the creation of this document   **

## 2022-08-29 NOTE — NURSING NOTE
Offered to apply LUE resting wrist splint, pt refused stating "no that bothers me too much"  Education given, pt still refused

## 2022-08-29 NOTE — PROGRESS NOTES
Internal Medicine Progress Note  Patient: Baldemar Bird  Age/sex: 80 y o  female  Medical Record #: [id number]      ASSESSMENT/PLAN: (Interval History)  Baldemar Bird is seen and examined and management for following issues:    Acute embolic right MCA CVA  · Had near occlusion of right MCA  · Felt to be a Xarelto failure and was switched to Pradaxa  · ECHO w/o thrombus  · Continue statin  · On Baclofen for tone left arm = much improved     Chronic atrial fibrillation  · Sees Dr Sheng Ríos as OP  · Rates controlled  · On Pradaxa now, Xarelto stopped     PPM  · VVI  · Is not MRI conditional     Valvular disease  · Current ECHO:  LVEF 65%, mod AR/mild AS, mod TR with severely elevated RV systolic pressure, mild-mod MR, mod LAE/mild NEO  · Euvolemic currently     HTN  · Home:  Verapamil 240mg qhs/Cozaar 159 mg qd/Bystolic 5mg qd/lasix 69EU qd/Aldactone 25mg qd  · Here:  Verapamil 240mg qhs/Cozaar 321BD qd/Bystolic 5mg qd  · OP note states has left RA stenosis but no testing in OP records or Care Everywhere to verify  · No changes again today     HLD  · Home:  Crestor 10mg qd = Neuro rec when discharged to reduce to 5mg qd  · Here:  Pravachol 40mg qd     DARRIUS  · S/p NS 1 liter   · Back to baseline  · Continue to hold Lasix and Aldactone for now     CAD  · Nonobstructive  · Card cath 8/2021 done for borderline abn stress test and found 50% mid RCA/40% mid LAD = no intervention done  · Continue statin  · Was not on ASA as an OP     Hypomagnesemia  · Takes here and at home 400 mg MagOx  · Mg level 8/20 was 2 4     Left chest pain  · Had left neck pain (not new for her) then pain under left breast = reproducible with palpation  · EKG was 100% v-paced but no obvious acute changes; trops negative Bengay applied  · At the time, felt likely M/S from spasm  She then had worsening pain in chest and described pressure so RR called    · Trops = 29/-, 28/-1, 28/-1  · Etio is her spasticity and Dr Yvonne elder Baclofen to 5mg TID and 5mg qhs prn which has helped but she is still having some left neck discomfort     Cough  · Have not witnessed her coughing but she endorses  · Has some clear-whitish secretions  · CXR negative 8/25  · Dr Bipin Suresh added Pepcid in case etio of cough is 2/2 GERD  · Has prn Robitussin   · Added Flonase = stopped since not effective  · Inc tessalon perles to 200mg TID 8/28     Urine retention  · Getting str cath'd  · Management per PMR     Vaginal yeast infection  · Monistat pack and gave 1 dose of Diflucan 150mg         DC planning: Reteam       The above assessment and plan was reviewed and updated as determined by my evaluation of the patient on 8/29/2022      Labs:   Results from last 7 days   Lab Units 08/29/22  0537 08/25/22  0452   WBC Thousand/uL 10 21* 8 30   HEMOGLOBIN g/dL 11 3* 11 6   HEMATOCRIT % 36 3 37 9   PLATELETS Thousands/uL 317 386     Results from last 7 days   Lab Units 08/29/22  0537 08/25/22  0452   SODIUM mmol/L 135 134*   POTASSIUM mmol/L 4 3 4 5   CHLORIDE mmol/L 105 106   CO2 mmol/L 26 24   BUN mg/dL 29* 32*   CREATININE mg/dL 1 10 1 05   CALCIUM mg/dL 9 3 9 3                   Review of Scheduled Meds:  Current Facility-Administered Medications   Medication Dose Route Frequency Provider Last Rate    acetaminophen  650 mg Oral Q6H PRN Diana Amezcua MD      baclofen  5 mg Oral TID Lucy Patterson MD      baclofen  5 mg Oral HS PRN Lucy Patterson MD      benzonatate  200 mg Oral TID TOMY Felipe      bisacodyl  10 mg Rectal Daily PRN Diana Amezcua MD      dabigatran etexilate  150 mg Oral Q12H [facility] Diana Amezcua MD      famotidine  20 mg Oral Daily Lucy Patterson MD      guaiFENesin  200 mg Oral Q6H PRN TOMY Felipe      losartan  100 mg Oral Daily Diana Amezcua MD      magnesium oxide  400 mg Oral Daily Diana Amezcua MD      melatonin  3 mg Oral QPM Lucy Patterson MD      menthol-methyl salicylate   Apply externally BID Lucy Patterson MD  miconazole   Topical BID PRN TOMY Fisher      And    miconazole  200 mg Vaginal HS TOMY Fisher      nebivolol  5 mg Oral Daily Theodora Acevedo MD      nitroglycerin  0 4 mg Sublingual Q5 Min PRN TOMY Duong      phenol  1 spray Mouth/Throat Q2H PRN Akshat Spear MD      polyethylene glycol  17 g Oral Daily PRN Theodora Acevedo MD      pravastatin  40 mg Oral Daily With Adri Perry MD      verapamil  240 mg Oral HS Theodora Acevedo MD         Subjective/ HPI: Patient seen and examined  Patients overnight issues or events were reviewed with nursing or staff during rounds or morning huddle session  New or overnight issues include the following:     No new or overnight issues  Offers no complaints    ROS:   A 10 point ROS was performed; negative except as noted above       *Labs /Radiology studies reviewed  *Medications reviewed and reconciled as needed  *Please refer to order section for additional ordered labs studies  *Case discussed with primary attending during morning huddle case rounds    Physical Examination:  Vitals:   Vitals:    08/28/22 1359 08/28/22 2138 08/28/22 2144 08/29/22 0538   BP: 128/66 132/59 132/59 111/56   BP Location: Right arm  Right arm Left arm   Pulse: 68  64 61   Resp: 19  17 16   Temp: 98 2 °F (36 8 °C)  98 °F (36 7 °C) 97 8 °F (36 6 °C)   TempSrc: Oral  Oral Oral   SpO2: 97%  97% 99%   Weight:       Height:           General Appearance: no distress, conversive  HEENT: PERRLA, conjuctiva normal; oropharynx clear; mucous membranes moist   Neck:  Supple, normal ROM  Lungs: CTA, normal respiratory effort, no retractions, expiratory effort normal  CV: regular rate and rhythm; no rubs/murmurs/gallops, PMI normal   ABD: soft; ND/NT; +BS  EXT: no edema  Skin: normal turgor, normal texture, no rashes  Psych: affect normal, mood normal  Neuro: AAO      The above physical exam was reviewed and updated as determined by my evaluation of the patient on 8/29/2022  Invasive Devices  Report    None                    VTE Pharmacologic Prophylaxis: Pradaxa  Code Status: Level 1 - Full Code  Current Length of Stay: 10 day(s)      Total time spent:  30 minutes with more than 50% spent counseling/coordinating care  Counseling includes discussion with patient re: progress  and discussion with patient of his/her current medical state/information  Coordination of patient's care was performed in conjunction with primary service  Time invested included review of patient's labs, vitals, and management of their comorbidities with continued monitoring  In addition, this patient was discussed with medical team including physician and advanced extenders  The care of the patient was extensively discussed and appropriate treatment plan was formulated unique for this patient  ** Please Note:  voice to text software may have been used in the creation of this document   Although proof errors in transcription or interpretation are a potential of such software**

## 2022-08-29 NOTE — PROGRESS NOTES
08/29/22 1100   Pain Assessment   Pain Assessment Tool 0-10   Pain Score No Pain   Restrictions/Precautions   Precautions Bed/chair alarms;Cognitive; Fall Risk;Aspiration;Pain;Supervision on toilet/commode   Weight Bearing Restrictions No   ROM Restrictions No   Braces or Orthoses   (multipodus LLE (declined for lunch))   Cognition   Overall Cognitive Status Impaired   Arousal/Participation Alert; Cooperative   Subjective   Subjective "I am very uncomfortable OOB in this chair"   Sit to Lying   Type of Assistance Needed Physical assistance   Physical Assistance Level 25% or less   Comment assist with LLE   Sit to Lying CARE Score 3   Sit to Stand   Type of Assistance Needed Physical assistance   Physical Assistance Level 76% or more   Comment at times, patient required max A as she can be retropulsive; with instruction to push up with BL UE and lean "nose over toes", patient can be Tan   Sit to Stand CARE Score 2   Bed-Chair Transfer   Type of Assistance Needed Physical assistance   Physical Assistance Level 51%-75%   Comment SPT with no AD; best if going to patient's R side so that she can use her RUE which also increases anterior weight shift   Chair/Bed-to-Chair Transfer CARE Score 2   Transfer Bed/Chair/Wheelchair   Limitations Noted In Balance; Endurance;Problem Solving; Sequencing;UE Strength;LE Strength   Car Transfer   Reason if not Attempted Environmental limitations   Car Transfer CARE Score 10   Walk 10 Feet   Type of Assistance Needed Physical assistance   Physical Assistance Level Total assistance   Comment mod Ax1 R HHA with chair follow 50' (fatigued and unable to turn)   Walk 10 Feet CARE Score 1   Walk 150 Feet   Reason if not Attempted Safety concerns   Walk 150 Feet CARE Score 88   Walking 10 Feet on Uneven Surfaces   Reason if not Attempted Safety concerns   Walking 10 Feet on Uneven Surfaces CARE Score 88   Ambulation   Does the patient walk? 2   Yes   Primary Mode of Locomotion Prior to Admission Walk   Distance Walked (feet) 30 ft  (with R rail; 30' R HHA; 48' R HHA)   Assist Device Hand Hold;Other  (Rail)   Gait Pattern Inconsistant Linda;Decreased foot clearance;L foot drag;L hemiparesis; Lateral deviation; Improper weight shift  (L foot external rotation)   Limitations Noted In Balance; Coordination; Endurance; Heel Strike; Sequencing;Speed;Strength;Swing   Provided Assistance with: Balance;Direction;Weight Shift;Trunk Support;Limb Advancement   Wheel 50 Feet with Two Turns   Type of Assistance Needed Physical assistance   Physical Assistance Level 26%-50%   Wheel 50 Feet with Two Turns CARE Score 3   Wheel 150 Feet   Type of Assistance Needed Physical assistance   Physical Assistance Level 26%-50%   Wheel 150 Feet CARE Score 3   Wheelchair mobility   Does the patient use a wheelchair? 1  Yes   Type of Wheelchair Used 1  Manual   Method Right upper extremity;Right lower extremity   Assistance Provided For Obstacles;Remove Leg Rest;Replace Leg Rest;Remove armrests;Replace armrests   Distance Level Surface (feet) 150 ft   Curb or Single Stair   Reason if not Attempted Safety concerns   1 Step (Curb) CARE Score 88   4 Steps   Reason if not Attempted Activity not applicable   4 Steps CARE Score 9   12 Steps   Reason if not Attempted Activity not applicable   12 Steps CARE Score 9   Picking Up Object   Reason if not Attempted Safety concerns   Picking Up Object CARE Score 88   Therapeutic Interventions   Flexibility BLE heel cord and HS gentle stretch TERT 4 minutes   Neuromuscular Re-Education 10' backwards walking with RUE supported on railing; 10' R + 10' L side stepping (requried occasional assist to advance LLE)   Equipment Use   NuStep L1x8 minutes BUE/LE   Other Comments   Comments Did not use L AFO this session- may still benefit from use to increase clearance; Toe out noted likely related to tone   Assessment   Treatment Assessment Patient making progress thusfar with skilled PT intervention  She has progressed this week from walking on the BWSS to overground walking with moderate/max Ax1 and a chair follow  During walking, she requires physical assist for weight shifting to clear ground, verbal cues at times to increase step length, and presents with high tone/spasticity which prevents her LE from buckling but does pull her into external rotation/toe out  Additional barriers to safe walking include retropulsion, poor dynamic balance, dec sensation/proprioception and poor righting reactions  She remains a high caregiver burden which is also a barrier to discharge as she lives home alone with aides helping M-Thursday for 7-7 5 hour a day  She is unsafe to return home and requires continued skilled PT intervention to maximize her function and safety  Barriers to Discharge Inaccessible home environment;Decreased caregiver support   PT Barriers   Physical Impairment Decreased strength;Decreased endurance; Impaired balance;Decreased range of motion;Decreased mobility; Decreased coordination;Decreased cognition;Decreased safety awareness; Impaired sensation; Impaired tone;Orthopedic restrictions   Functional Limitation Car transfers; Ramp negotiation;Stair negotiation;Transfers;Standing;Walking; Wheelchair management   Plan   Treatment/Interventions Functional transfer training; Therapeutic exercise; Bed mobility;Gait training; Compensatory technique education;Equipment eval/education   PT Therapy Minutes   PT Time In 1100   PT Time Out 1200   PT Total Time (minutes) 60   PT Mode of treatment - Individual (minutes) 60   PT Mode of treatment - Concurrent (minutes) 0   PT Mode of treatment - Group (minutes) 0   PT Mode of treatment - Co-treat (minutes) 0   PT Mode of Treatment - Total time(minutes) 60 minutes   PT Cumulative Minutes 855

## 2022-08-30 PROCEDURE — 97535 SELF CARE MNGMENT TRAINING: CPT

## 2022-08-30 PROCEDURE — 99232 SBSQ HOSP IP/OBS MODERATE 35: CPT | Performed by: INTERNAL MEDICINE

## 2022-08-30 PROCEDURE — 97130 THER IVNTJ EA ADDL 15 MIN: CPT

## 2022-08-30 PROCEDURE — 99233 SBSQ HOSP IP/OBS HIGH 50: CPT | Performed by: PHYSICAL MEDICINE & REHABILITATION

## 2022-08-30 PROCEDURE — 97129 THER IVNTJ 1ST 15 MIN: CPT

## 2022-08-30 PROCEDURE — 97112 NEUROMUSCULAR REEDUCATION: CPT

## 2022-08-30 PROCEDURE — 97530 THERAPEUTIC ACTIVITIES: CPT

## 2022-08-30 RX ORDER — DOCUSATE SODIUM 100 MG/1
100 CAPSULE, LIQUID FILLED ORAL 2 TIMES DAILY
Status: DISCONTINUED | OUTPATIENT
Start: 2022-08-30 | End: 2022-08-31

## 2022-08-30 RX ADMIN — BENZONATATE 200 MG: 100 CAPSULE ORAL at 20:09

## 2022-08-30 RX ADMIN — BACLOFEN 5 MG: 10 TABLET ORAL at 09:27

## 2022-08-30 RX ADMIN — LOSARTAN POTASSIUM 100 MG: 50 TABLET, FILM COATED ORAL at 09:27

## 2022-08-30 RX ADMIN — BENZONATATE 200 MG: 100 CAPSULE ORAL at 17:17

## 2022-08-30 RX ADMIN — MAGNESIUM OXIDE TAB 400 MG (241.3 MG ELEMENTAL MG) 400 MG: 400 (241.3 MG) TAB at 09:27

## 2022-08-30 RX ADMIN — BENZONATATE 200 MG: 100 CAPSULE ORAL at 09:27

## 2022-08-30 RX ADMIN — BACLOFEN 5 MG: 10 TABLET ORAL at 17:16

## 2022-08-30 RX ADMIN — MENTHOL, METHYL SALICYLATE 1 APPLICATION: 10; 15 CREAM TOPICAL at 09:30

## 2022-08-30 RX ADMIN — PRAVASTATIN SODIUM 40 MG: 40 TABLET ORAL at 17:16

## 2022-08-30 RX ADMIN — DABIGATRAN ETEXILATE MESYLATE 150 MG: 150 CAPSULE ORAL at 20:09

## 2022-08-30 RX ADMIN — DOCUSATE SODIUM 100 MG: 100 CAPSULE, LIQUID FILLED ORAL at 17:17

## 2022-08-30 RX ADMIN — VERAPAMIL HYDROCHLORIDE 240 MG: 240 TABLET ORAL at 21:26

## 2022-08-30 RX ADMIN — DOCUSATE SODIUM 100 MG: 100 CAPSULE, LIQUID FILLED ORAL at 09:27

## 2022-08-30 RX ADMIN — Medication 3 MG: at 20:09

## 2022-08-30 RX ADMIN — MICONAZOLE NITRATE 200 MG: 200 SUPPOSITORY VAGINAL at 21:22

## 2022-08-30 RX ADMIN — FAMOTIDINE 20 MG: 20 TABLET, FILM COATED ORAL at 09:27

## 2022-08-30 RX ADMIN — NEBIVOLOL 5 MG: 5 TABLET ORAL at 09:48

## 2022-08-30 RX ADMIN — DABIGATRAN ETEXILATE MESYLATE 150 MG: 150 CAPSULE ORAL at 09:27

## 2022-08-30 RX ADMIN — ACETAMINOPHEN 650 MG: 325 TABLET ORAL at 02:21

## 2022-08-30 RX ADMIN — MENTHOL, METHYL SALICYLATE: 10; 15 CREAM TOPICAL at 17:18

## 2022-08-30 RX ADMIN — BACLOFEN 5 MG: 10 TABLET ORAL at 21:21

## 2022-08-30 RX ADMIN — BACLOFEN 5 MG: 10 TABLET ORAL at 12:50

## 2022-08-30 NOTE — PROGRESS NOTES
08/30/22 1000   Pain Assessment   Pain Assessment Tool 0-10   Pain Score 5   Pain Location/Orientation Orientation: Left; Location: Neck   Pain Onset/Description Onset: Gradual;Onset: Ongoing   Patient's Stated Pain Goal No pain   Hospital Pain Intervention(s) Rest   Restrictions/Precautions   Precautions Bed/chair alarms;Cognitive; Fall Risk;Pain;Supervision on toilet/commode  (language barrier)   Weight Bearing Restrictions No   ROM Restrictions No   Cognition   Overall Cognitive Status Impaired   Arousal/Participation Alert; Cooperative   Attention Attends with cues to redirect   Orientation Level Oriented X4   Memory Decreased short term memory;Decreased recall of recent events   Following Commands Follows one step commands with increased time or repetition   Sit to Stand   Type of Assistance Needed Physical assistance;Verbal cues   Physical Assistance Level 51%-75%   Comment cont to require MAX VC's and tactile cues to maintain ant weight shift upon standing  Sit to Stand CARE Score 2   Bed-Chair Transfer   Type of Assistance Needed Physical assistance;Verbal cues; Adaptive equipment   Physical Assistance Level 51%-75%   Comment sit pivots to R only this session   Chair/Bed-to-Chair Transfer CARE Score 2   Transfer Bed/Chair/Wheelchair   Adaptive Equipment Hand Hold   Car Transfer   Reason if not Attempted Environmental limitations   Car Transfer CARE Score 10   Walk 10 Feet   Type of Assistance Needed Physical assistance;Verbal cues   Physical Assistance Level Total assistance   Comment initially MODA with WC follow HHA on R then second trial no WC follow but SBA of second person for safety  Walk 10 Feet CARE Score 1   Walk 50 Feet with Two Turns   Type of Assistance Needed Physical assistance;Verbal cues   Physical Assistance Level Total assistance   Comment initially MODA with WC follow HHA on R then second trial no WC follow but SBA of second person for safety     Walk 50 Feet with Two Turns CARE Score 1 Walk 150 Feet   Type of Assistance Needed Physical assistance;Verbal cues   Physical Assistance Level Total assistance   Comment initially MODA with WC follow HHA on R then second trial no WC follow but SBA of second person for safety  Walk 150 Feet CARE Score 1   Walking 10 Feet on Uneven Surfaces   Reason if not Attempted Safety concerns   Walking 10 Feet on Uneven Surfaces CARE Score 88   Ambulation   Does the patient walk? 2  Yes   Primary Mode of Locomotion Prior to Admission Walk   Distance Walked (feet) 180 ft  (x2)   Assist Device Hand Hold   Gait Pattern Inconsistant Linda; Slow Linda;Decreased foot clearance;L foot drag; Forward Flexion;L hemiparesis; Narrow LUCY;Step to; Improper weight shift;Decreased L stance   Limitations Noted In Balance; Coordination; Endurance; Heel Strike; Safety;Speed;Strength;Swing   Provided Assistance with: Balance;Weight Shift   Walk Assist Level Moderate Assist;Chair Follow   Wheel 50 Feet with Two Turns   Type of Assistance Needed Physical assistance   Physical Assistance Level 26%-50%   Wheel 50 Feet with Two Turns CARE Score 3   Wheel 150 Feet   Type of Assistance Needed Physical assistance   Physical Assistance Level 26%-50%   Wheel 150 Feet CARE Score 3   Wheelchair mobility   Does the patient use a wheelchair? 1  Yes   Type of Wheelchair Used 1  Manual   Method Right upper extremity;Right lower extremity   Assistance Provided For Obstacles;Remove Leg Rest;Replace Leg Rest;Remove armrests;Replace armrests   Distance Level Surface (feet) 150 ft   Curb or Single Stair   Reason if not Attempted Safety concerns   1 Step (Curb) CARE Score 88   4 Steps   Reason if not Attempted Activity not applicable   4 Steps CARE Score 9   12 Steps   Reason if not Attempted Activity not applicable   12 Steps CARE Score 9   Therapeutic Interventions   Neuromuscular Re-Education HHA x180' x2 on R, step thru's with increased WB to LLE and RLE stepping thru     Equipment Use   NuStep L1 x10 min for neuro prime  Noted improved ROm post Nustep   Assessment   Treatment Assessment Pt participated in 60 min skilled PT session (10:30-11:30) and 30 min (10-10:30) SE session  Pt with increased focus on NPP gait, increased ROM to LLE and improved WB to LLE  Pt cont to be limited by increase LLE tone and pt c/o LLE feeling "heavy"  Pt utilized L AFO with gait this session and noted improved step length 80% of the time but cont to have short step at times with VC's to stop and "restart" herself  Pt able to amb with 2 turns this session and does better to R  Pt will cont to benefit from cont increased functional transfers, increased gait, increased L step length and decreased tone to improved ROM  Cont POC as tolerated  Problem List Decreased strength;Decreased range of motion;Decreased endurance; Impaired balance;Decreased mobility; Decreased coordination;Decreased cognition; Impaired tone;Pain   Barriers to Discharge Inaccessible home environment;Decreased caregiver support   PT Barriers   Functional Limitation Car transfers;Stair negotiation;Standing;Transfers; Walking; Wheelchair management;Ramp negotiation   Plan   Treatment/Interventions Functional transfer training;LE strengthening/ROM; Therapeutic exercise; Endurance training;Patient/family training;Bed mobility;Gait training   Progress Progressing toward goals   Recommendation   PT Discharge Recommendation Post acute rehabilitation services   PT Therapy Minutes   PT Time In 1000   PT Time Out 1130   PT Total Time (minutes) 90   PT Mode of treatment - Individual (minutes) 90   PT Mode of treatment - Concurrent (minutes) 0   PT Mode of treatment - Group (minutes) 0   PT Mode of treatment - Co-treat (minutes) 0   PT Mode of Treatment - Total time(minutes) 90 minutes   PT Cumulative Minutes 945   Therapy Time missed   Time missed?  No        Stroke Education Series    Pt participated in skilled Stroke Education Series in an individual setting to address the topic of Purpose of Rehab post stroke in both verbal and written formats  Education within this session included a review of the individual roles of the rehab team, functions of the acute rehab center, and neuro rehabilitation treatment strategies  The goal of this education session was to provide the patient with an understanding of the overall importance of the therapy process  This section reviews neuroplasticity principles as well that includes how patiens can incorporate specificity, intensity, repetition and salience into their home exercise program  This allows the patient to connect neuro rehabilitation treatment strategies to his or her individual therapy process  The patients are engaged in conversation to incorporate activities they like to do into therapy sessions  Following education, the patient's response to education is: needs reinforcment   Additional topics to individualize this section of stroke education include: upper and lower extremity orthotics and/or bracing , involvement of therapy reducing risk of another stroke, and how family can help in the rehab process       Start Time: 10:00    End Time: 10:30

## 2022-08-30 NOTE — PROGRESS NOTES
[facility] Occupational Therapy Daily Note  Patient Active Problem List   Diagnosis    Other chest pain    Essential hypertension    Hyperlipidemia LDL goal <100    Stage 3 chronic kidney disease (HCC)    Osteoarthritis of both wrists    Chronic atrial fibrillation (HCC)    Tubulovillous adenoma    Gastroesophageal reflux disease without esophagitis    Pacemaker    B12 deficiency    Allergic rhinitis due to pollen    Cavus deformity of foot    Midline cystocele    Hallux abducto valgus, bilateral    Left renal artery stenosis (HCC)    Osteoarthritis of hip    Osteopenia    Pain due to onychomycosis of toenails of both feet    Left atrial enlargement    Lipoma of right upper extremity    Diverticulosis of colon    Abnormal nuclear stress test    Renal insufficiency    Arterial ischemic stroke, MCA (middle cerebral artery), right, acute ([address] )    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 ([address] )    Anemia    At risk for venous thromboembolism (VTE)    DARRIUS (acute kidney injury) ([address] )    Spastic hemiparesis of left dominant side as late effect of cerebral infarction ([address] )    Valvular heart disease    CAD (coronary artery disease)    Urinary retention    Neck pain    Healthcare maintenance    At risk for coping difficulty       Past Medical History:   Diagnosis Date    A-fib (HCC)     Anemia     Arthritis     Breast pain, right     Chest pain on breathing     Colon polyp     Cough     Diverticulosis     Encounter for screening colonoscopy     H/O sick sinus syndrome     Heart murmur     High cholesterol     History of atrial fibrillation     Rate ontrolled  On xarelto 15mg (creatinine 50) Followed by Dr Cass Moon with Cardiology     History of weight loss     Report loose fitting clothes  Weight stable (3lbs difference)  Last colo showed small tubular adenoma x2  Breast biopsy last showed fibrocystic changes  TSH wnl  Will check cbc, bmp, spep          Hx of long term use of blood thinners     Hypertension Irregular heart beat     Pacemaker     Preop examination     Shortness of breath     Viral bronchitis      Etiologic Diagnosis: right mca ischemic cva  Restrictions/Precautions  Precautions: Bed/chair alarms, Cognitive, Fall Risk, Pain, Supervision on toilet/commode  Weight Bearing Restrictions: No  ROM Restrictions: No  Braces or Orthoses: Other (Comment) (multipodus LLE (pt refused to wear at end of session today))  ADL Team Goal: Patient will be independent with ADLs with least restrictive device upon completion of rehab program  Occupational Therapy LTG's  Eating Oral care Bathing LB dress UB dress   Eating Goal: 06  Independent - Patient completes the activity by him/herself with no assistance from a helper  Oral Hygiene Goal: 06  Independent - Patient completes the activity by him/herself with no assistance from a helper  Shower/bathe self Goal: 06  Independent - Patient completes the activity by him/herself with no assistance from a helper  Lower body dressing Goal: 06  Independent - Patient completes the activity by him/herself with no assistance from a helper  Upper body dressing Goal: 06  Independent - Patient completes the activity by him/herself with no assistance from a helper  Toileting Toilet txf Func txf IADL Med    Toileting hygiene Goal: 06  Independent - Patient completes the activity by him/herself with no assistance from a helper  Toilet transfer Goal: 06  Independent - Patient completes the activity by him/herself with no assistance from a helper           OT interventions: Treatment/Interventions: ADL retraining, Functional transfer training, Therapeutic exercise, Endurance training, Cognitive reorientation, Patient/family training, Equipment eval/education, Compensatory technique education  Discharge Plan:  OT Discharge Recommendation:  (pending progress)   DME: [facility]     08/30/22 0700   Pain Assessment   Pain Assessment Tool 0-10   Pain Score 10 - Worst Possible Pain   Pain Location/Orientation Location: Neck   Hospital Pain Intervention(s) Heat applied  (sof ttissue massage)   Lifestyle   Autonomy "my neck hurts, but the shower felt good "   Eating   Type of Assistance Needed Physical assistance   Physical Assistance Level 25% or less   Comment self feeding observed with breakfast meal  session focusing on trialing various grasps to increase success with food to mouth  Pt demo improved abilty to complete elbow extension allowing inc abilty to manipulate items on the tray  Pt able to complete distal bimanual fine tasks  pt able to bring food to mouth with good wrist control  Pt demo slight difficulty with wrist control with bimanual task with buttering toast    Eating CARE Score 3   Shower/Bathe Self   Type of Assistance Needed Physical assistance   Physical Assistance Level 26%-50%   Comment SHower completed today  Focus on L UE use  able to wash hair, and L Lower leg to foot  FOcus on anterior weight shifting with extension of L elbow  ALso focused on application of lotion with L UE use  Shower/Bathe Self CARE Score 3   Upper Body Dressing   Type of Assistance Needed Physical assistance   Physical Assistance Level 26%-50%   Comment UB dressing completed in static stance to cont to challenge standing balance multitasking  Pt does require increased assistance with L UE control with overall MOD-MIN A for balance correction  Upper Body Dressing CARE Score 3   Lower Body Dressing   Type of Assistance Needed Physical assistance   Physical Assistance Level 76% or more   Comment L UE used for cross body reaching to threaad R LE  assit for L  in stance pt using bimnaul UE or donning pants over hips  still demo decreased proprioception at timesafter activity is completed     Lower Body Dressing CARE Score 2   Putting On/Taking Off Footwear   Type of Assistance Needed Physical assistance   Physical Assistance Level 51%-75%   Comment assist for positioning with cross leg tech to don sock on R foot with R+L UE  Unable to complete with L LE 2* spasticity  able to place R foot into shoe and don, unable to complete on L   Putting On/Taking Off Footwear CARE Score 2   Lying to Sitting on Side of Bed   Type of Assistance Needed Physical assistance   Physical Assistance Level 51%-75%   Lying to Sitting on Side of Bed CARE Score 2   Sit to Stand   Type of Assistance Needed Physical assistance   Physical Assistance Level 51%-75%   Comment cont to require tactile cues for facilitation of anterior weight shifting  Sit to Stand CARE Score 2   Bed-Chair Transfer   Type of Assistance Needed Physical assistance   Physical Assistance Level 76% or more   Comment inc assist required with pivot to L side  Chair/Bed-to-Chair Transfer CARE Score 2   Toileting Hygiene   Type of Assistance Needed Physical assistance   Physical Assistance Level 76% or more   Comment L UE grab bar use in partial stance with L LE blocking and facilitation of weight bearing   Toileting Hygiene CARE Score 2   Toilet Transfer   Type of Assistance Needed Physical assistance   Physical Assistance Level 76% or more   Comment SPT w/ grab bar   Toilet Transfer CARE Score 2   Additional Activities   Additional Activities Comments MH 10 min, then STM 10 min in preparation for self feeding task  STM to upper trap, SCM, occipital area  Pt reports reduction in resting pain  Assessment   Treatment Assessment Pt participated in skilled OT session focusing on functional ADL retraining with focus on L UE NMR forced use in tasks  Pt demo improved AROM with ability to extinguish elbow flexion during functional tasks  Improvements in functional transfers with ability to utilize L UE in transfers now  [place] to complete shower today, See above for details on ADL function  Pt demo ability to complete self feeding with L UE with increased time, still limited at times 2* shoulder spasticity   Pt demo good understanding for forced use with L UE  Pt's left UE function currently Functional assist  Prehension patterns noted (lateral Pinch)  Pt is able to complete 9 hole peg test if given ample time  increased isolation of digits noted     Continue to focus neurological treatment plan:Repetitive Task training, Trunk restrained Reaching, Weight bearing, Mirror Therapy, Body awareness, Gross motor coordination, motor planning, Spatial awareness, Functional Attention , Constraint Induced Movement Therapy (modified) and KinesioTaping   Prognosis Fair   Plan   Progress Progressing toward goals   OT Therapy Minutes   OT Time In 0700   OT Time Out 0900   OT Total Time (minutes) 120   OT Mode of treatment - Individual (minutes) 120   OT Mode of treatment - Concurrent (minutes) 0   OT Mode of treatment - Group (minutes) 0   OT Mode of treatment - Co-treat (minutes) 0   OT Mode of Treatment - Total time(minutes) 120 minutes   OT Cumulative Minutes 850

## 2022-08-30 NOTE — PROGRESS NOTES
Internal Medicine Progress Note  Patient: Wisam Tavera  Age/sex: 80 y o  female  Medical Record #: [id number]      ASSESSMENT/PLAN: (Interval History)  Wisam Tavera is seen and examined and management for following issues:    Acute embolic right MCA CVA  · Had near occlusion of right MCA  · Felt to be a Xarelto failure and was switched to Pradaxa  · ECHO w/o thrombus  · Continue statin  · On Baclofen for tone left arm = much improved     Chronic atrial fibrillation  · Sees Dr Alma Weller as OP  · Rates controlled  · On Pradaxa now, Xarelto stopped     PPM  · VVI  · Is not MRI conditional     Valvular disease  · Current ECHO:  LVEF 65%, mod AR/mild AS, mod TR with severely elevated RV systolic pressure, mild-mod MR, mod LAE/mild NEO  · Euvolemic currently     HTN  · Home:  Verapamil 240mg qhs/Cozaar 032 mg qd/Bystolic 5mg qd/lasix 66JD qd/Aldactone 25mg qd  · Here:  Verapamil 240mg qhs/Cozaar 086VI qd/Bystolic 5mg qd  · OP note states has left RA stenosis but no testing in OP records or Care Everywhere to verify  · No changes again today     HLD  · Home:  Crestor 10mg qd = Neuro rec when discharged to reduce to 5mg qd  · Here:  Pravachol 40mg qd     DARRIUS  · S/p NS 1 liter   · Back to baseline  · Continue to hold Lasix and Aldactone for now     CAD  · Nonobstructive  · Card cath 8/2021 done for borderline abn stress test and found 50% mid RCA/40% mid LAD = no intervention done  · Continue statin  · Was not on ASA as an OP     Hypomagnesemia  · Takes here and at home 400 mg MagOx  · Mg level 8/20 was 2 4     Left chest pain  · Had left neck pain (not new for her) then pain under left breast = reproducible with palpation  · EKG was 100% v-paced but no obvious acute changes; trops negative Bengay applied  · At the time, felt likely M/S from spasm  She then had worsening pain in chest and described pressure so RR called    · Trops = 29/-, 28/-1, 28/-1  · Etio is her spasticity and on Baclofen  · Dr Chetan Soriano Baclofen to 5mg QID on 8/29  Has 5mg qhs prn as well       Cough  · Have not witnessed her coughing but she endorses  · Has some clear-whitish secretions  · CXR negative 8/25  · Dr Gerda Delgado added Pepcid in case etio of cough is 2/2 GERD  · Has prn Robitussin   · Was on Flonase = stopped since not effective  · Inc tessalon perles to 200mg TID 8/28  · Has been on the Cozaar for quite a while she says  · Appears to be coughing on saliva per ST but she did well otherwise with their eval and they did not change her diet     Urine retention  · Getting str cath'd  · Management per PMR     Vaginal yeast infection  · Is receiving Monistat pack and gave 1 dose of Diflucan 150mg         DC planning: Reteam       The above assessment and plan was reviewed and updated as determined by my evaluation of the patient on 8/30/2022      Labs:   Results from last 7 days   Lab Units 08/29/22  0537 08/25/22  0452   WBC Thousand/uL 10 21* 8 30   HEMOGLOBIN g/dL 11 3* 11 6   HEMATOCRIT % 36 3 37 9   PLATELETS Thousands/uL 317 386     Results from last 7 days   Lab Units 08/29/22  0537 08/25/22  0452   SODIUM mmol/L 135 134*   POTASSIUM mmol/L 4 3 4 5   CHLORIDE mmol/L 105 106   CO2 mmol/L 26 24   BUN mg/dL 29* 32*   CREATININE mg/dL 1 10 1 05   CALCIUM mg/dL 9 3 9 3                   Review of Scheduled Meds:  Current Facility-Administered Medications   Medication Dose Route Frequency Provider Last Rate    acetaminophen  650 mg Oral Q6H PRN Shantell Jimenez MD      baclofen  5 mg Oral HS PRN Federica Hawkins MD      baclofen  5 mg Oral 4x Daily Federica Hawkins MD      benzonatate  200 mg Oral TID Hernandez [place], CRNP      bisacodyl  10 mg Rectal Daily PRN Shantell Jimenez MD      dabigatran etexilate  150 mg Oral Q12H [address] Shantell Jimenez MD      docusate sodium  100 mg Oral BID Federica Hawkins MD      famotidine  20 mg Oral Daily Federica Hawkins MD      guaiFENesin  200 mg Oral Q6H PRN Hernandez [place], CRNP      losartan 100 mg Oral Daily Hellen Sigala MD      magnesium oxide  400 mg Oral Daily Hellen Sigala MD      melatonin  3 mg Oral QPM Yasmine Condon MD      menthol-methyl salicylate   Apply externally BID Yasmine Condon MD      miconazole   Topical BID PRN TOMY Rutherford      And    miconazole  200 mg Vaginal HS TOMY Shah      nebivolol  5 mg Oral Daily Hellen Sigala MD      nitroglycerin  0 4 mg Sublingual Q5 Min PRN TOMY Whiting      phenol  1 spray Mouth/Throat Q2H PRN Yasmine Condon MD      polyethylene glycol  17 g Oral Daily PRN Hellen Sigala MD      pravastatin  40 mg Oral Daily With Kan Santillan MD      verapamil  240 mg Oral HS Hellen Sigala MD         Subjective/ HPI: Patient seen and examined  Patients overnight issues or events were reviewed with nursing or staff during rounds or morning huddle session  New or overnight issues include the following:     No new or overnight issues  Offers no complaints    ROS:   A 10 point ROS was performed; negative except as noted above       *Labs /Radiology studies reviewed  *Medications reviewed and reconciled as needed  *Please refer to order section for additional ordered labs studies  *Case discussed with primary attending during morning huddle case rounds    Physical Examination:  Vitals:   Vitals:    08/29/22 2100 08/30/22 0224 08/30/22 0601 08/30/22 0948   BP: 155/68 101/54 125/56 133/60   BP Location: Right arm Right arm Right arm Right arm   Pulse: 61 63 60    Resp: 18 17 18    Temp: 98 3 °F (36 8 °C) 97 7 °F (36 5 °C) (!) 97 4 °F (36 3 °C)    TempSrc: Oral Oral Oral    SpO2: 96% 98% 99%    Weight:       Height:           General Appearance: no distress, conversive  HEENT: PERRLA, conjuctiva normal; oropharynx clear; mucous membranes moist   Neck:  Supple, normal ROM  Lungs: CTA, normal respiratory effort, no retractions, expiratory effort normal  CV: regular rate and rhythm; no rubs/murmurs/gallops, PMI normal   ABD: soft; ND/NT; +BS  EXT: no edema  Skin: normal turgor, normal texture, no rashes  Psych: affect normal, mood normal  Neuro: AAO      The above physical exam was reviewed and updated as determined by my evaluation of the patient on 8/30/2022  Invasive Devices  Report    None                    VTE Pharmacologic Prophylaxis: Pradaxa  Code Status: Level 1 - Full Code  Current Length of Stay: 11 day(s)      Total time spent:  30 minutes with more than 50% spent counseling/coordinating care  Counseling includes discussion with patient re: progress  and discussion with patient of his/her current medical state/information  Coordination of patient's care was performed in conjunction with primary service  Time invested included review of patient's labs, vitals, and management of their comorbidities with continued monitoring  In addition, this patient was discussed with medical team including physician and advanced extenders  The care of the patient was extensively discussed and appropriate treatment plan was formulated unique for this patient  ** Please Note:  voice to text software may have been used in the creation of this document   Although proof errors in transcription or interpretation are a potential of such software**

## 2022-08-30 NOTE — PROGRESS NOTES
08/30/22 1330   Pain Assessment   Pain Assessment Tool 0-10   Pain Score 3   Pain Location/Orientation Orientation: Left; Location: Neck   Pain Onset/Description Onset: Ongoing   Hospital Pain Intervention(s) Rest   Restrictions/Precautions   Precautions Bed/chair alarms;Cognitive; Fall Risk;Pain;Supervision on toilet/commode   Comprehension   Comprehension (FIM) 5 - Needs help/cues, repetition only RARELY ( < 10% of the time)   Expression   Expression (FIM) 5 - Needs help/cues only RARELY (< 10% of the time)   Social Interaction   Social Interaction (FIM) 6 - Interacts appropriately with others BUT requires extra  time   Problem Solving   Problem solving (FIM) 4 - Solves basic problems 75-89% of time   Memory   Memory (FIM) 5 - Erick cues patient   Speech/Language/Cognition Assessment   Treatment Assessment Pt participated in skilled ST session focusing on cognitive linguistic skills  Session was completed in Croatian as this is pt's primary language  Pt demonstrated STM recall of recent daily events this date to include recall of completing other therapies earlier in the day  Engaged in a working memory task, pt was verbally presented with [employer] words to which she answered a follow up question related to category inclusion by recalling words for 12/18 trials  Provided with verbal cues, pt improved to 14/18 and furthermore to 18/18 when given single repetition of stimuli  Suspecting that some additional repetition or rewording was due to language barrier as pt appeared to have different words for certain objects due to dialect in comparison to words which were presented  Additionally, pt was then presented with a calendar containing various events with details about date, time, people, etc  Pt initially demonstrated decreased comprehension of task as she attempted to answer questions based on her own information/experiences   Pt benefited from repetition instructions, additional verbal cues and models to which she then appeared to better understand task and instruction to utilize the presented calendar, which was in [place]  After this time, pt then answered verbally presented comprehension questions with 8/10 accuracy with improvement to 10/10 when provided with min verbal cues  Overall, pt is continuing to make progress towards goals  At this time, pt will continue to benefit from ongoing skilled SLP services to improve functional cognitive linguistic skills in order to increase level of independence and to decrease caregiver burden on discharge  SLP Therapy Minutes   SLP Time In 1330   SLP Time Out 1400   SLP Total Time (minutes) 30   SLP Mode of treatment - Individual (minutes) 30   SLP Mode of treatment - Concurrent (minutes) 0   SLP Mode of treatment - Group (minutes) 0   SLP Mode of treatment - Co-treat (minutes) 0   SLP Mode of Treatment - Total time(minutes) 30 minutes   SLP Cumulative Minutes 240   Therapy Time missed   Time missed?  No

## 2022-08-30 NOTE — CASE MANAGEMENT
mssg left on voice mail of son's number requesting call back to review team  Update and dc planning  Son stopped by [employer] and asked if a meeting could be scheduled for Friday morning when his daughter is able to attend  He inquired about additional time here and cm stated dc planning needs to start  Cm inquired if he felt his mom could return home with help  He stated the agency has already called him wanting to set up a new contract but he is hesitant until the doctor tells him what she needs  Cm explained pt will need 24 hour supervision and minor assist on dc  Cm asked if he wished for contact to be made with the agency but he didn't have the info with him  Cm provided contact phone number for him to call with [facility] agency info  Son explained they did not have a good experience with his father at a local snf so that is why pt is statin she will not go to one

## 2022-08-30 NOTE — TEAM CONFERENCE
Acute RehabilitationTeam Conference Note  Date: 8/30/2022   Time: 11:59 AM       Patient Name:  Sukumar Burgos       Medical Record Number: [id number]   YOB: 1940  Sex: Female          Room/Bed:  /-01  Payor Info:  Payor: [address] [place] [address],[address]  REP / Plan: Donte Heady / Product Type: Medicare HMO /      Admitting Diagnosis: Stroke (cerebrum) ([facility] ) [I63 9]   Admit Date/Time:  8/19/2022  3:22 PM  Admission Comments: No comment available     Primary Diagnosis:  Arterial ischemic stroke, MCA (middle cerebral artery), right, acute ([facility] )  Principal Problem: Arterial ischemic stroke, MCA (middle cerebral artery), right, acute ([facility] [address] )    Patient Active Problem List    Diagnosis Date Noted    Valvular heart disease 08/20/2022    CAD (coronary artery disease) 08/20/2022    Urinary retention 08/20/2022    Neck pain 08/20/2022    Healthcare maintenance 08/20/2022    At risk for coping difficulty 08/20/2022    Anemia 08/19/2022    At risk for venous thromboembolism (VTE) 08/19/2022    DARRIUS (acute kidney injury) ([facility] ) 08/19/2022    Spastic hemiparesis of left dominant side as late effect of cerebral infarction ([facility] [address] [facility] ) 08/19/2022    Arterial ischemic stroke, MCA (middle cerebral artery), right, acute ([facility] ) 08/12/2022    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 ([facility] [facility] ) 08/12/2022    Renal insufficiency     Abnormal nuclear stress test 08/20/2021    Diverticulosis of colon 05/20/2019    Pacemaker 02/26/2018    Tubulovillous adenoma 02/09/2018    Gastroesophageal reflux disease without esophagitis 02/09/2018    Other chest pain 12/08/2017    Essential hypertension 12/08/2017    Hyperlipidemia LDL goal <100 12/08/2017    Stage 3 chronic kidney disease ([address] ) 12/08/2017    Osteoarthritis of both wrists 12/08/2017    Chronic atrial fibrillation ([address] ) 12/08/2017    Cavus deformity of foot 06/27/2017    Hallux abducto valgus, bilateral 06/27/2017    Lipoma of right upper extremity 05/17/2017    Pain due to onychomycosis of toenails of both feet 01/20/2017    Allergic rhinitis due to pollen 10/12/2015    Midline cystocele 12/23/2014    Osteoarthritis of hip 07/17/2014    B12 deficiency 12/06/2013    Osteopenia 11/15/2013    Left renal artery stenosis (HCC) 08/27/2013    Left atrial enlargement 08/27/2013       Physical Therapy:    Weight Bearing Status: Full Weight Bearing  Transfers: Maximum Assistance, Moderate Assistance  Bed Mobility: Minimal Assistance  Amulation Distance (ft): 50 feet  Ambulation: Total Assistance (mod/max A and CF)  Assistive Device for Ambulation: Hand Hold Assistance  Wheelchair Mobility Distance: 150 ft  Wheelchair Mobility: Minimal Assistance, Moderate Assistance  Discharge Recommendations:  (TBD)    Patient making progress thusfar with skilled PT intervention  She has progressed this week from walking on the BWSS to overground walking with moderate/max Ax1 and a chair follow  During walking, she requires physical assist for weight shifting to clear ground, verbal cues at times to increase step length, and presents with high tone/spasticity which prevents her LE from buckling but does pull her into external rotation/toe out  Additional barriers to safe walking include retropulsion, poor dynamic balance, dec sensation/proprioception and poor righting reactions  She remains a high caregiver burden which is also a barrier to discharge as she lives home alone with aides helping M-Thursday for 7-7 5 hour a day  She is unsafe to return home and requires continued skilled PT intervention to maximize her function and safety         Occupational Therapy:  Eating: Supervision  Grooming: Maximum Assistance  Bathing: Assist of 2  Bathing: Assist of 2  Upper Body Dressing: Maximum Assistance  Lower Body Dressing: Assist of 2  Toileting: Assist of 2  Toilet Transfer: Assist of 2  Cognition: Exceptions to WNL  Cognition: Decreased Executive Functions, Decreased Attention, Decreased Comprehension, Decreased Safety  Orientation: Person, Place, Time, Situation  Discharge Recommendations:  (pending progress)       Occupational Therapy Weekly Team Note    Pt is demonstrating good progress with occupational therapy and is progressing toward long term goals for ADL, IADL, and functional transfers/mobility  Pts long term goals for ADLs are Independent with [address] and wheelchair  Pt continues to present with impairments in activity tolerance, endurance, standing balance/tolerance, sitting balance/tolerance, UE strength, UE ROM, and (L) attention   Occupational performance remains limited by abnormal tone, (L) UE dysmetria , (L) hemiparesis, decreased caregiver support, and risk for falls  Family training/education will be required prior to D/C  Pt will continue to benefit from skilled acute rehab OT services to address above mentioned barriers and maximize functional independence in baseline areas of occupation to meet established treatment goals with overall decreased burden of care  Plan of care to continue to focus on ADL Retraining , LB Dressing, UB dressing,  LHAE education/training, Functional Cognition, Functional Attention, Standing tolerance, Standing balance , UE NMR left, midline awareness, Fine motor coordination, Gross motor coordination, R attention, DME training/education, Family training/education, Energy conservation training/education, healthy coping education, Leisure and social pursuits, and sitting balance  Goals for the upcoming week are: Establish assistance at home from family, establish DME needs, focus on tone management/educaiton/strategies, L UE NMR  Anticipate Re-team at this time  Lani Bradley                   Speech Therapy:  Mode of Communication: Verbal  Cognition: Exceptions to WNL  Cognition: Decreased Memory, Decreased Executive Functions, Decreased Attention, Decreased Comprehension  Orientation: Person, Place, Time, Situation  Swallowing: Within Defined Limits  Diet Recommendations: Regular Diet, Thin  Discharge Recommendations: Home with:  [address] with[de-identified] 24 Hour Supervision, Family Support (services pending progress)  Cognitive linguistic evaluation was initiated this session, consisting of patient interview and portions of the Informal Cognitive Linguistic Evaluation Tool  Evaluation was completed in Estonian, as this is pt's primary language, utilizing the [employer] phone  Pt's daughter, Red Funes, was also present throughout evaluation  Pt reports that she lives alone but does have a HHA present to assist her, however, did not mention additional details related to this as she states her HHA has returned to work elsewhere  Pt and pt's daughter indicate that pt's son and her daughter in law assist pt with driving and other tasks as needed  Pt reports that she was independent for all IADLs but that her son has now been managing her finances since her admission  During interview, pt demonstrated good insight into physical deficits, also explaining the 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 as this affects her ability to complete tasks independently at this time  Overall, current assessment is limited but pt is presenting with suspected mild deficits in cognitive linguistic skills to include decreased working memory, short term memory, problem solving, attention, comprehension, and executive functions  Pt was appropriate in social interaction and demonstrated fairly functional expression and basic comprehension skills, however, did benefit from occasional repetition of information to improve understanding  Pt will benefit from further evaluation of skills in order to obtain greater insight and information into current functioning   Based on current evaluation, pt is recommended for skilled SLP services during acute rehab stay in order to further assess current skills, as well as to maximize overall cognitive linguistic skills for increased level of independence and safety and to decrease caregiver burden on discharge  Update from week 8/30/2022: Pt continues to be followed for ongoing cognitive tx sessions, in which pt is making slower and steady progress at this time  It is noted that while pt is able to understand basic questions/information in [place], pt does benefit from more complex questions to be presented in [address]  Additionally, when engaging in structured tasks, presentation of information is provided in Wolof at this time  Ongoing barriers which present include decreased ST/working memory, decreased executive function skills (problem solving, reasoning, sequencing, organization of thoughts), but when pt is provided increased time and rephrasing given information, improvement is noted in comprehension skills  Currently pt is functioning at supervision level given comprehension, expression, executive function and memory skills and mod I for social interaction  Of note, in more recent sessions, pt had increased cough earlier in the day where pt was bringing up mucous  When engaging in d/w pt in regard to this, SLP did use [employer] ashlyn for improved comprehension given questions  Pt does deny any h/o allergies, post nasal drip prior to admission  In pt's description of symptoms to SLP, sounds likely due to post nasal drip type symptoms  When asking if pt has been exhibiting any increased cough given meals, pt did deny at this time  SLP reached out to MD, Dr Benuel Gosselin to discuss decongestant due to suspected post nasal drip, to where it was noted that swallow evaluation orders have been placed  Dysphagia bedside assessment was completed in which based on oral mechanism exam and observation of meal, pt demonstrates oral motor skills and swallowing function that are considered WFL   Adequate oral motor coordination/control and strong cough are positive factors in reducing her risk for aspiration  Pt denies chronic coughing and/or overt s/sx of aspiration during mealtime, but notes coughing up mucous between meals; therefore, suspect coughing may be secondary to sinuses/post nasal drip  SLP reviewed safe swallowing strategies (slow rate, small bites, alternating liquids and solids, remain upright for 30-45 mins after eating) and pt verbalized understanding  Pt independently alternated liquids and solids consistently throughout meal  Although she was noted to occasionally take larger bites and use a faster rate, she safely managed mixed consistencies  Skilled SLP services targeting dysphagia are not warranted at this time as , but encourage pt to alert staff if she notices any changes with swallowing function  Currently primary focus will be for cognitive tx sessions to maximize overall independence given cognitive linguistic skills to decrease overall caregiver burden at time of discharge  Nursing Notes:  Appetite: Good  Diet Type: Regular/House                      Diet Patient/Family Education Complete: Yes                         Type of Wound Patient/Family Education: Yes  Bladder: Continent     Bladder Patient/Family Education: Yes  Bowel: Continent     Bowel Patient/Family Education: Yes  Pain Location/Orientation: Location: Neck  Pain Score: 10                       Hospital Pain Intervention(s): Medication (See MAR)  Pain Patient/Family Education: Yes  Medication Management/Safety  Safe Administration: Yes (by staff)  Medication Patient/Family Education Complete: No (on going)    Acute embolic right MCA CVA - Had near occlusion of right MCA, Felt to be a Xarelto failure and was switched to Pradaxa, ECHO w/o thrombus, Continue statin, On Baclofen for tone left arm = much improved  Chronic atrial fibrillation - Sees Dr Hammad Forrester as OP, Rates controlled, On Pradaxa now, Xarelto stopped   PPM - VVI, Is not MRI conditional  Valvular disease - Current ECHO:  LVEF 65%, mod AR/mild AS, mod TR with severely elevated RV systolic pressure, mild-mod MR, mod LAE/mild NEO, Euvolemic currently  HTN - Home:  Verapamil 240mg qhs/Cozaar 838 mg qd/Bystolic 5mg qd/lasix 66DM qd/Aldactone 25mg qd, Here:  Verapamil 240mg qhs/Cozaar 137EA qd/Bystolic 5mg qd, OP note states has left RA stenosis but no testing in OP records or Care Everywhere to verify, No changes again today  HLD - Home:  Crestor 10mg qd = Neuro rec when discharged to reduce to 5mg qd, Here:  Pravachol 40mg qd  DARRIUS - S/p NS 1 liter, Back to baseline, Continue to hold Lasix and Aldactone for now  CAD - Nonobstructive, Card cath 8/2021 done for borderline abn stress test and found 50% mid RCA/40% mid LAD = no intervention done, Continue statin, Was not on ASA as an OP  Hypomagnesemia - Takes here and at home 400 mg MagOx, Mg level 8/20 was 2 4  Left chest pain - Had left neck pain (not new for her) then pain under left breast = reproducible with palpation, EKG was 100% v-paced but no obvious acute changes; trops negative Bengay applied, At the time, felt likely M/S from spasm  She then had worsening pain in chest and described pressure so RR called, Trops = 29/-, 28/-1, 28/-1, Etio is her spasticity and Dr Jill Muniz inc Baclofen to 5mg TID and 5mg qhs prn which has helped but she is still having some left neck discomfort  Cough - Have not witnessed her coughing but she endorses, Has some clear-whitish secretions, CXR negative 8/25, Dr Jill Muniz added Pepcid in case etio of cough is 2/2 GERD, Has prn Robitussin, Added Flonase = stopped since not effective, Inc tessalon perles to 200mg TID 8/28  Urine retention - Getting str cath'd, Management per PMR  Vaginal yeast infection - Monistat pack and gave 1 dose of Diflucan 150mg  Pt is continent of both bowel and bladder  Pt requires alarms for safety  This week we will continue to encourage independence with ADLs while monitoring labs and vitals and maintaining skin integrity   We will continue to keep the patent free from falls by maintaining safety precautions with patient education and safe transfers  We will continue to assess the patient's pain and medicate accordingly  Case Management:     Discharge Planning  Living Arrangements: Lives Alone  Support Systems: Son  Assistance Needed: TBD  Type of Current Residence: Other (Comment)  Current Home Care Services: Yes  Type of Current Home Care Services: Home health aide  Pt continues to participate with therapy but due to living alone and current functional llimitaitons, pt may require continued subacute setting on dc  Contd stay review conducted, determination pending  Is the patient actively participating in therapies? yes  List any modifications to the treatment plan:     Barriers Interventions   spasticity Medications, medical team following   Retaining urine,  cath'd intermittently   nightime pain bengay at hs   Lives alone with partial caregiver Request increase in caregivers vs subacute         Is the patient making expected progress toward goals?  yes  List any update or changes to goals:     Medical Goals: Patient will be medically stable for discharge to [facility] upon completion of rehab program and Patient will be able to manage medical conditions and comorbid conditions with medications and follow up upon completion of rehab program    Weekly Team Goals:   Rehab Team Goals  ADL Team Goal: Patient will be independent with ADLs with least restrictive device upon completion of rehab program  Bowel/Bladder Team Goal: Patient will return to premorbid level for bladder/bowel management upon completion of rehab program  Transfer Team Goal: Patient will be independent with transfers with least restrictive device upon completion of rehab program  Locomotion Team Goal: Patient will be independent with locomotion with least restrictive device upon completion of rehab program  Cognitive Team Goal: Patient will be independent for basic tasks and require supervision for complex tasks upon completion of rehab program    Discussion: pt presents with the above barriers and is making minimal gains  Pt is mod to max a with sit to stnd and sit pivot transfers  Pt can be retropulsive and has decreased safety  Total a for walking due to w/c follow  adls ar mox to total a for adls  Son to be contacted as pt is inconsistent in reporting what the home health aides assist  Pt is not wishing to go to subacute setting but will need 24 hr assist on dc  Anticipated Discharge Date:  reteam [facility] Team Members Present: The following team members are supervising care for this patient and were present during this Weekly Team Conference      Physician: Dr Scarlet Rodriguez MD  : AYAKA Schofield  Registered Nurse: Nimco Mistry RN  Physical Therapist: Nicole Epstein DPT  Occupational Therapist: ALEX Guzman  Speech Therapist: Pradeep White, [address], [facility]-SLP

## 2022-08-30 NOTE — PROGRESS NOTES
Pastoral Care Progress Note    2022  Patient: Nas Brown : 1940  Admission Date & Time: 2022 1522  MRN: [id number] [facility]: [id number]      Fr Mateo Calle visited patient, provided communion, blessing, and prayer       22 1500   Clinical Encounter Type   Visited With Patient   Routine Visit Follow-up   Bahai Encounters   Bahai Needs Prayer   Sacramental Encounters   Communion Given Indicator Yes   Sacrament Other Other (Comment)  ([place])

## 2022-08-30 NOTE — PLAN OF CARE
Problem: MOBILITY - ADULT  Goal: Maintain or return to baseline ADL function  Description: INTERVENTIONS:  -  Assess patient's ability to carry out ADLs; assess patient's baseline for ADL function and identify physical deficits which impact ability to perform ADLs (bathing, care of mouth/teeth, toileting, grooming, dressing, etc )  - Assess/evaluate cause of self-care deficits   - Assess range of motion  - Assess patient's mobility; develop plan if impaired  - Assess patient's need for assistive devices and provide as appropriate  - Encourage maximum independence but intervene and supervise when necessary  - Involve family in performance of ADLs  - Assess for home care needs following discharge   - Consider OT consult to assist with ADL evaluation and planning for discharge  - Provide patient education as appropriate  Outcome: Progressing  Goal: Maintains/Returns to pre admission functional level  Description: INTERVENTIONS:  - Set and communicate daily mobility goal to care team and patient/family/caregiver  - Collaborate with rehabilitation services on mobility goals if consulted  - Perform Range of Motion 3 times a day  - Reposition patient every 2 hours    - Dangle patient 3 times a day  - Stand patient 3 times a day  - Ambulate patient 3 times a day  - Out of bed to chair 3 times a day   - Out of bed for meals 3 times a day  - Out of bed for toileting  - Record patient progress and toleration of activity level   Outcome: Progressing     Problem: Prexisting or High Potential for Compromised Skin Integrity  Goal: Skin integrity is maintained or improved  Description: INTERVENTIONS:  - Identify patients at risk for skin breakdown  - Assess and monitor skin integrity  - Assess and monitor nutrition and hydration status  - Monitor labs   - Assess for incontinence   - Turn and reposition patient  - Assist with mobility/ambulation  - Relieve pressure over bony prominences  - Avoid friction and shearing  - Provide appropriate hygiene as needed including keeping skin clean and dry  - Evaluate need for skin moisturizer/barrier cream  - Collaborate with interdisciplinary team   - Patient/family teaching  - Consider wound care consult   Outcome: Progressing     Problem: Potential for Falls  Goal: Patient will remain free of falls  Description: INTERVENTIONS:  - Educate patient/family on patient safety including physical limitations  - Instruct patient to call for assistance with activity   - Consult OT/PT to assist with strengthening/mobility   - Keep Call bell within reach  - Keep bed low and locked with side rails adjusted as appropriate  - Keep care items and personal belongings within reach  - Initiate and maintain comfort rounds  - Make Fall Risk Sign visible to staff  - Offer Toileting every 2-4 Hours, in advance of need  - Initiate/Maintain bed/chair alarm  - Obtain necessary fall risk management equipment: nonskid footwear  - Apply yellow socks and bracelet for high fall risk patients  - Consider moving patient to room near nurses station  Outcome: Progressing     Problem: PAIN - ADULT  Goal: Verbalizes/displays adequate comfort level or baseline comfort level  Description: Interventions:  - Encourage patient to monitor pain and request assistance  - Assess pain using appropriate pain scale  - Administer analgesics based on type and severity of pain and evaluate response  - Implement non-pharmacological measures as appropriate and evaluate response  - Consider cultural and social influences on pain and pain management  - Notify physician/advanced practitioner if interventions unsuccessful or patient reports new pain  Outcome: Progressing     Problem: INFECTION - ADULT  Goal: Absence or prevention of progression during hospitalization  Description: INTERVENTIONS:  - Assess and monitor for signs and symptoms of infection  - Monitor lab/diagnostic results  - Monitor all insertion sites, i e  indwelling lines, tubes, and drains  - Monitor endotracheal if appropriate and nasal secretions for changes in amount and color  - Imbler appropriate cooling/warming therapies per order  - Administer medications as ordered  - Instruct and encourage patient and family to use good hand hygiene technique  - Identify and instruct in appropriate isolation precautions for identified infection/condition  Outcome: Progressing     Problem: DISCHARGE PLANNING  Goal: Discharge to home or other facility with appropriate resources  Description: INTERVENTIONS:  - Identify barriers to discharge w/patient and caregiver  - Arrange for needed discharge resources and transportation as appropriate  - Identify discharge learning needs (meds, wound care, etc )  - Arrange for interpretive services to assist at discharge as needed  - Refer to Case Management Department for coordinating discharge planning if the patient needs post-hospital services based on physician/advanced practitioner order or complex needs related to functional status, cognitive ability, or social support system  Outcome: Progressing

## 2022-08-30 NOTE — CASE MANAGEMENT
Received continued stay approval via e-mail from Mike Sarmiento for an additional 8 days with the next review date of 9/8/2022

## 2022-08-31 PROCEDURE — 99232 SBSQ HOSP IP/OBS MODERATE 35: CPT | Performed by: PHYSICAL MEDICINE & REHABILITATION

## 2022-08-31 PROCEDURE — 97130 THER IVNTJ EA ADDL 15 MIN: CPT

## 2022-08-31 PROCEDURE — 97110 THERAPEUTIC EXERCISES: CPT

## 2022-08-31 PROCEDURE — 97530 THERAPEUTIC ACTIVITIES: CPT

## 2022-08-31 PROCEDURE — 97112 NEUROMUSCULAR REEDUCATION: CPT

## 2022-08-31 PROCEDURE — 97129 THER IVNTJ 1ST 15 MIN: CPT

## 2022-08-31 PROCEDURE — 99232 SBSQ HOSP IP/OBS MODERATE 35: CPT | Performed by: INTERNAL MEDICINE

## 2022-08-31 RX ORDER — TAMSULOSIN HYDROCHLORIDE 0.4 MG/1
0.4 CAPSULE ORAL
Status: DISCONTINUED | OUTPATIENT
Start: 2022-08-31 | End: 2022-09-13 | Stop reason: HOSPADM

## 2022-08-31 RX ADMIN — PRAVASTATIN SODIUM 40 MG: 40 TABLET ORAL at 15:46

## 2022-08-31 RX ADMIN — MENTHOL, METHYL SALICYLATE 1 APPLICATION: 10; 15 CREAM TOPICAL at 08:34

## 2022-08-31 RX ADMIN — DOCUSATE SODIUM 100 MG: 100 CAPSULE, LIQUID FILLED ORAL at 08:26

## 2022-08-31 RX ADMIN — LOSARTAN POTASSIUM 100 MG: 50 TABLET, FILM COATED ORAL at 08:26

## 2022-08-31 RX ADMIN — BACLOFEN 5 MG: 10 TABLET ORAL at 17:09

## 2022-08-31 RX ADMIN — MAGNESIUM OXIDE TAB 400 MG (241.3 MG ELEMENTAL MG) 400 MG: 400 (241.3 MG) TAB at 08:27

## 2022-08-31 RX ADMIN — TAMSULOSIN HYDROCHLORIDE 0.4 MG: 0.4 CAPSULE ORAL at 15:46

## 2022-08-31 RX ADMIN — BACLOFEN 5 MG: 10 TABLET ORAL at 11:38

## 2022-08-31 RX ADMIN — FAMOTIDINE 20 MG: 20 TABLET, FILM COATED ORAL at 08:27

## 2022-08-31 RX ADMIN — BACLOFEN 5 MG: 10 TABLET ORAL at 21:17

## 2022-08-31 RX ADMIN — BENZONATATE 200 MG: 100 CAPSULE ORAL at 21:18

## 2022-08-31 RX ADMIN — BENZONATATE 200 MG: 100 CAPSULE ORAL at 15:46

## 2022-08-31 RX ADMIN — Medication 3 MG: at 21:18

## 2022-08-31 RX ADMIN — NEBIVOLOL 5 MG: 5 TABLET ORAL at 08:34

## 2022-08-31 RX ADMIN — VERAPAMIL HYDROCHLORIDE 240 MG: 240 TABLET ORAL at 21:17

## 2022-08-31 RX ADMIN — POLYETHYLENE GLYCOL 3350 17 G: 17 POWDER, FOR SOLUTION ORAL at 07:05

## 2022-08-31 RX ADMIN — DABIGATRAN ETEXILATE MESYLATE 150 MG: 150 CAPSULE ORAL at 08:26

## 2022-08-31 RX ADMIN — MENTHOL, METHYL SALICYLATE 1 APPLICATION: 10; 15 CREAM TOPICAL at 17:10

## 2022-08-31 RX ADMIN — DABIGATRAN ETEXILATE MESYLATE 150 MG: 150 CAPSULE ORAL at 21:17

## 2022-08-31 RX ADMIN — BENZONATATE 200 MG: 100 CAPSULE ORAL at 08:26

## 2022-08-31 RX ADMIN — BACLOFEN 5 MG: 10 TABLET ORAL at 08:27

## 2022-08-31 NOTE — PROGRESS NOTES
08/31/22 7991   Pain Assessment   Pain Assessment Tool 0-10   Pain Score No Pain   Pain Location/Orientation Orientation: Left; Location: Neck   Effect of Pain on Daily Activities pt forced to remove L UE from Nustep exercise 5 min into 10 min session  Restrictions/Precautions   Precautions Bed/chair alarms; Fall Risk;Cognitive;Pain;Supervision on toilet/commode   Weight Bearing Restrictions No   ROM Restrictions No   Braces or Orthoses AFO; Other (Comment)  (L multipodus boot, donned at end of session)   Subjective   Subjective pt reports being tired from a limited amount of sleep last night  Reports she has been having trouble urinating, nursing staff already aware  Pt reports no pain during sitting and is compliant w/ therapy   Roll Left and Right   Comment only roll R performed, UE support from bed rail needed, min Ax1 for LE positioning     Sit to Lying   Type of Assistance Needed Physical assistance   Physical Assistance Level 25% or less   Comment L LE assitance w/ positioning on bed   Sit to Lying CARE Score 3   Sit to Stand   Type of Assistance Needed Physical assistance   Physical Assistance Level 26%-50%   Comment VC/physical assistance required for L LE postioning prior to initation of transfer   Sit to Stand CARE Score 3   Bed-Chair Transfer   Type of Assistance Needed Physical assistance   Physical Assistance Level 26%-50%   Comment stand pivot transfer w/gait belt donned   Chair/Bed-to-Chair Transfer CARE Score 3   Car Transfer   Reason if not Attempted Environmental limitations   Car Transfer CARE Score 10   Walk 10 Feet   Type of Assistance Needed Physical assistance   Physical Assistance Level 51%-75%   Comment paulina walker used   Walk 10 Feet CARE Score 2   Walk 150 Feet   Reason if not Attempted Safety concerns   Walk 150 Feet CARE Score 88   Walking 10 Feet on Uneven Surfaces   Reason if not Attempted Safety concerns   Walking 10 Feet on Uneven Surfaces CARE Score 88   Ambulation   Does the patient walk? 2  Yes   Primary Mode of Locomotion Prior to Admission Walk   Distance Walked (feet) 35 ft   Assist Device Hand Hold   Gait Pattern Inconsistant Linda; Slow Linda;Decreased foot clearance;L foot drag;L hemiparesis; Narrow LUCY;Step to; Improper weight shift;Decreased L stance   Limitations Noted In Balance; Coordination; Endurance;Speed;Strength;Swing;Safety; Heel Strike   Provided Assistance with: Balance;Weight Shift   Walk Assist Level Moderate Assist;Chair Follow   Findings pt amb w/ HHA and WCF 35ftx2  L AFO and gait belt donned prior to amb  pt requires VC for proper weight shifting  Pt also amb w/ R sided brien walker in room 10 ft  Brien walker navigation in room was anatoliy but requires max VC for sequencing   Wheelchair mobility   Findings not the focus of this session   Curb or Single Stair   Comment not focus of this session   12 Steps   Reason if not Attempted Safety concerns   12 Steps CARE Score 88   Stairs   Findings not focus of this session   Picking Up Object   Reason if not Attempted Safety concerns   Picking Up Object CARE Score 88   Toilet Transfer   Type of Assistance Needed Physical assistance   Physical Assistance Level 76% or more   Comment Stand pivot transfer performed w/ grab bar onto BSC  BM produced, nurses made aware  Toilet Transfer CARE Score 2   Therapeutic Interventions   Neuromuscular Re-Education HHA 35ftx2 w/ wheelchair follow focusing on NPP  10 ft amb w/ brien walker   Equipment Use   NuStep L1x10 min, L UE removed after 5 min due to pain  (vitals taken after completing 10 min, 152/67, 88bpm, SpO2 98)   Assessment   Treatment Assessment Pt participated in 60 min of skilled PT session focused on NMR and introducing brien walker for amb  Amb to start session 2x35 w/ R HHA and WCF  pt reports feeling very tired after completing 1x35ft, pt needed cues for step length as well as two instances where L LE needed cueing to clear the ground   Pt had increase of neck pain during 10 min Nustep session, where L UE was forced to rest to dissipate the pain  Toilet transfer performed post NuStep, BM noted  Amb w/ R paulina walker completed in room post toileting  L HHA provided, mod Ax1  weight shfiting deficits present w/ all ambulation  Potential focus of next session  Pt would benefit from continued therapy services to increase endurance during amb, become more independent w/ trxrs in order to decrease caregiver burden and increase functional independence  Problem List Decreased strength;Decreased endurance; Impaired balance;Decreased mobility; Decreased coordination;Decreased range of motion; Impaired tone;Pain   Barriers to Discharge Inaccessible home environment;Decreased caregiver support   PT Barriers   Physical Impairment Decreased strength;Decreased endurance;Decreased range of motion; Impaired balance;Decreased mobility; Decreased coordination; Impaired tone;Pain; Impaired sensation;Decreased safety awareness;Decreased cognition   Functional Limitation Car transfers; Ramp negotiation;Stair negotiation;Standing;Transfers; Walking; Wheelchair management   Plan   Treatment/Interventions Functional transfer training;LE strengthening/ROM; Therapeutic exercise; Endurance training;Patient/family training;Gait training;Bed mobility   Progress Progressing toward goals   Recommendation   PT Discharge Recommendation   (TBD)   PT Therapy Minutes   PT Time In 0930   PT Time Out 1030   PT Total Time (minutes) 60   PT Mode of treatment - Individual (minutes) 60   PT Mode of treatment - Concurrent (minutes) 0   PT Mode of treatment - Group (minutes) 0   PT Mode of treatment - Co-treat (minutes) 0   PT Mode of Treatment - Total time(minutes) 60 minutes   PT Cumulative Minutes 1005   Therapy Time missed   Time missed?  No   Amount of time missed 0

## 2022-08-31 NOTE — PROGRESS NOTES
08/31/22 0819   Pain Assessment   Pain Assessment Tool 0-10   Pain Score No Pain   Restrictions/Precautions   Precautions Bed/chair alarms; Fall Risk;Pain;Supervision on toilet/commode;Cognitive   Comprehension   Comprehension (FIM) 5 - Understands basic directions and conversation   Expression   Expression (FIM) 5 - Needs help/cues only RARELY (< 10% of the time)   Social Interaction   Social Interaction (FIM) 6 - Interacts appropriately with others BUT requires extra  time   Problem Solving   Problem solving (FIM) 4 - Solves basic problems 75-89% of time   Memory   Memory (FIM) 4 - Recalls 2 of 3 steps   Speech/Language/Cognition Assessmetn   Treatment Assessment Pt was awake, alert and in bed upon arrival, where nsg was just finishing w/ pt prior to ST session  Pt was still in hospital gown but was agreeable to get OOB in [facility] and participate in session down in therapy gym  When SLP asking pt about getting dressed, pt did state she had some clothing items in closet but was ok staying in the hospital gown, wearing a pair of hospital pants for session  Pt following directions as providing by SLP in regard to sitting self EOB, but needing assistance in moving L leg towards EOB, but which pt then able to sit self EOB w/o assistance  SLP assisting in threading pants, but when provided verbal 2-step directions for standing to get pants over hips, pt was able to follow w/o repetition or additional cues  Additionally, pt's ability to follow commands for transfer from [facility] to [facility] did not require increased verbal/visual cues  Once in therapy gym, pt easily engaging in written sequencing tasks given 5 steps and then 6 steps  When attempting to have pt write in the numbers, pt was only placing check marks and not the numbers noting that she was using dominant hand (but is the effected hand from stroke)  SLP numbering the steps as pt was verbalizing each step   As for written 5 step sequences, pt was 38/40 accurate, needing minimal cue from SLP to look again at 2 steps in a given task which were reversed  When increasing task to written 6 steps to arrange in order of occurrence, pt was 43/48 accurate noting difficulty given one item which upon review, pt was able to sequence accordingly given 48/48 accuracy  Lastly, pt engaged in verbal categorization given common categories to where pt was to provide SLP w/ at least 5 items per category  Pt was 15/15 accuracy given increased time to verbalize items in each category  Pt did continue to exhibit coughing spell x1 during mid w/ expelling mucous  Pt was able to verbalize that medication was started but appear to not currently be working  Pt continues to deny overt difficulty w/ cough during meals, again suspecting cough is related to some degree given allergies/post nasal drip  At this time, pt will continue to benefit from ongoing skilled SLP services targeting functional independence given cognitive linguistic skills to decrease caregiver burden at time of discharge  SLP Therapy Minutes   SLP Time In 0830   SLP Time Out 0930   SLP Total Time (minutes) 60   SLP Mode of treatment - Individual (minutes) 60   SLP Mode of treatment - Concurrent (minutes) 0   SLP Mode of treatment - Group (minutes) 0   SLP Mode of treatment - Co-treat (minutes) 0   SLP Mode of Treatment - Total time(minutes) 60 minutes   SLP Cumulative Minutes 300   Therapy Time missed   Time missed?  No

## 2022-08-31 NOTE — PROGRESS NOTES
Physical Medicine and Rehabilitation Progress Note  Sukumar Burgos 80 y o  female MRN: [id number]  Unit/Bed#: -47 Encounter: [id number]    HPI: 77-year-old female with a past medical history of AFib recently on Xarelto, sick sinus syndrome status status post pacemaker placement that is not MRI compatible, hypertension, hyperlipidemia, valvular disease, coronary artery disease developed dense left-sided weakness, dysarthria and presented to the hospital on 08/12/2022  She had systolic blood pressure of 200 on arrival   CT head showed focal area of gliosis within the high right post central gyrus and scattered chronic microvascular ischemic changes with more focal lucency along the anterior limb of the right internal capsule  CTA of head and neck showed near occlusive thrombus at the right MCA bifurcation, mild beating of the mid to distal ICA suspicious mild fibromuscular dysplasia and mild atherosclerotic disease at the bilateral distal carotid arteries  She was not a candidate for MRI due to pacemaker  CT cerebral perfusion study showed 8 mL of mismatch  She was not a candidate for tPA due to bleeding risk from being on Xarelto  She was not a thrombectomy candidate for neurointerventional   Patient was recommended to transition to Pradaxa  Patient was evaluated by skilled therapies and was found to have significant decline in ADLs and ambulation and [facility]  Chief Complaint: Required catheterization this AM      Interval History/Subjective:  No acute events overnight  Did not urinate last night  This morning catheterized for 700cc  Nursing had been giving patient a lot of water in hopes that it would stimulate her to urinate, but it just filled her bladder  She had low PVRs yesterday  No new abdominal pain, suprapubic pain, fevers, chills, N/V, abdominal pain  Last BM 8/31       ROS:  A 10 point review of systems was negative except for what is noted in the HPI  Today's Changes:  1  Start flomax today, may need to limit fluids or even consider placing a contreras if persistent  She tolerates catheterization, so would like to stick with this scheme for now due to lower risk of UTI  2  Strict Q6hr scan/cath  3  Had 1 incontinent stool today  Has had 2 loose stools so far today  Will stop colace  - Recheck magnesium  4  Labs in AM     Total visit time: 25 minutes, with more than 50% spent counseling/coordinating care  Counseling includes discussion with patient re: progress in therapies, functional issues observed by therapy staff, and discussion with patient regarding their current medical state and wellbeing  Coordination of patient's care was performed in conjunction with Internal Medicine service to monitor patient's labs, vitals, and management of their comorbidities  Assessment/Plan:    * Arterial ischemic stroke, MCA (middle cerebral artery), right, acute (HCC)  Assessment & Plan  - R MCA CVA   - CTA head/neck - near occlusive thrombus at the R MCA bifurcation, mild beading of the mid to distal ICAs suspicious for mild fibromuscular dysplasia, and mild atherosclerotic disease at bilateral distal carotid arteries     - no MRI because pacemaker not compatible    - Residual impairments on admission to ARC: L spastic hemiparesis, bowel/bladder dysfunction, possible cog impairments (assess for other impairments during ARC course)     Secondary stroke prevention  - repeat CT angio head and neck in 3 months to re-evaluate thrombus is a outpatient  - Antithrombotic: Pradaxa (switched from Xarelto for possible failure)   - Statin  - Optimal management of blood pressure and diabetes  - Patient at increased risk for stroke particularly early in post-stroke period - monitor neuro exam closely with low threshold to repeat imaging  -  patient and if applicable caregiver on optimal stroke management  - Follow-up with neurology and PCP after d/c   - Recommend acute comprehensive interdisciplinary inpatient rehabilitation to include intensive skilled therapies (PT, OT, ST) as outlined with oversight and management by rehabilitation physician as well as inpatient rehab level nursing, case management and weekly interdisciplinary team meetings  - patient does have fair left hand and wrist strength; can use resting hand splint for a few hours during the day and overnight but to not overuse and encourage use hand and wrist  - multi Podus boot on left foot patient can breaks to potentially decrease risk of contracture/skin breakdown   - patient has spastic left upper extremity making subluxation less likely although continue subluxation precautions      At risk for coping difficulty  Assessment & Plan  Supportive counseling  Psychology consult while in [facility]  Discharged on magnesium daily   Continue for now  Monitor level  Neck pain  Assessment & Plan  With taut muscle bands in left cervical paraspinal and trapezius areas  Gentle stretching  Baclofen 5 mg QID  Monitor closely given GFR  Bengay helpful  Heat not helpful  Manual therapies/modalities helpful   PRN APAP    Urinary retention  Assessment & Plan  Was using a purewick  On 8/25 was noted not to have any urine output overnight by nursing    - Bladder scan was >500cc  Ultimately catheterized for 1600cc  - UA negative   - Starting Q6hr scan/cath program  Volumes appropriate on this, but still needing catheterization on occasion    - 8/29 showed some improvement with timed voids  - 8/31 had a bladder scan/cath ~700 after being given a lot of fluids      - 8/31 starting flomax    - monitor for retention, incontinence, signs/symptoms of UTI  - ensure optimal bowel management   - recommend toileting program Q2-4 H during day and Q4-6H overnight   - bladder scan Q6H, monitor output and PVRs, straight cath >450 or if uncomfortable [id number]      CAD (coronary artery disease)  Assessment & Plan  IM consulted and with overall management at their discretion during ARC course  Antithrombotic: Pradaxa  Statin  Optimal blood pressure   Outpatient Cardiology follow-up    Per IM  · "Nonobstructive  · Card cath 8/2021 done for borderline abn stress test and found 50% mid RCA/40% mid LAD = no intervention done  · Continue statin  · Was not on ASA as an OP"       Valvular heart disease  Assessment & Plan  Per IM  · "Current ECHO:  LVEF 65%, mod AR/mild AS, mod TR with severely elevated RV systolic pressure, mild-mod MR, mod LAE/mild NEO  · Euvolemic currently but will watch since CTA on admission showed pulm edema and b/l pleural effusions"  -diuretics at their discretion   -Outpatient cardiology follow-up    Spastic hemiparesis of left dominant side as late effect of cerebral infarction [facility])  Assessment & Plan  Left upper extremity significantly (also some L neck tightness/spasms)  Modified Sada scale 3 in shoulder, and 2 in her elbow  - Has flexion synergy    - Has clonus in pronators and wrist flexors  Baclofen 5mg increased to QID  Monitor given GFR  May be more sleepy initially  Reached out to [facility] rep to see if we can get samples for inpatient administration of botulinum toxin   - 75 units lateral pec, 75 units biceps, 25 units FCR, 25 units pronator teres for total 200 units     - Have not been able to get Botox inpatient     Gentle range of motion with therapy  Patient does have movement of the hand and wrist and do want to encourage that - can still consider resting hand splint intermittently  Skilled PT OT  Optimal skin hygiene    DARRIUS (acute kidney injury) [facility])  Assessment & Plan  Resolved  8/29 Crea 1 1  Medicine consulted  Hold Lasix and Aldactone  Resume diuretics at discretion of medicine  Monitor labs and output    At risk for venous thromboembolism (VTE)  Assessment & Plan  SCDs, ambulation, and Pradaxa Pacemaker  Assessment & Plan  Not MRI compatible    Chronic atrial fibrillation [facility])  Assessment & Plan  IM consulted and with overall management at their discretion during ARC course  Rate control: Verapamil, Nebivolol   Antithrombotic: Pradaxa       Stage 3 chronic kidney disease [facility])  Assessment & Plan  Lab Results   Component Value Date    EGFR 46 08/29/2022    EGFR 49 08/25/2022    EGFR 49 08/22/2022    CREATININE 1 10 08/29/2022    CREATININE 1 05 08/25/2022    CREATININE 1 06 08/22/2022     Given IVF  Crea now back down to ~1 06 near baseline  Avoid nephrotoxic meds  Monitor voiding status  IM consulted  Hyperlipidemia LDL goal <100  Assessment & Plan  Statin    Essential hypertension  Assessment & Plan  Internal medicine consulted and co-management with their service  Monitor vitals with and without activity; monitor for orthostasis  Monitor hemoglobin, electrolytes, kidney function, hydration status   Current meds:  Losartan, nebivolol, Verapamil 240mg daily  - Lasix and Aldactone on hold      Other chest pain  Assessment & Plan  Resolved  8/25 with chest pain, ultimately felt to be 2/2 to spasticity  Improved with baclofen/robaxin  - delta hsTrop at 2 hr was -1    - EKG showed paced rhythm, no evidence of ischemia   - CXR unremarkable   - 30 min later had another episode that felt more like pressure  Rapid called, but not concerning for ACS  Generally reproducible   - Increased Baclofen and frequency of Pattie Cruz  Has a cough with it, and lungs sound good, and CXR unremarkable  - May also be component of GERD  Started on pepcid  Also on throat spray  Fluticasone was irritating and didn't make a difference    - Had SLP eval swallow - which was fine  Health Maintenance  #Delirium/Sleep: At risk  Environmental interventions  Optimize pain, bowel, bladder, sleep-wake cycle management  #Pain: Tylenol PRN  #Bowel: Last BM 8/31 and continent  Not on regimen only PRN   Had several loose stools so stopped docusate  #Skin/Pressure Injury Prevention: Turn Q2hr in bed, with weight shifts [id number] in wheelchair  #DVT Prophylaxis: Fully anticoagulated on pradaxa  #GI Prophylaxis: PO diet   #Code Status: Full Code  #FEN: Reg/Thins  #Dispo: Team Conference 8/30: Patient adamant not to go to SNF, but will need assistance at discharge  Had caregivers at home, but unclear what services they can provide  Goals are sup-Tan function  Son is aware will provide us info on current caregivers to clarify availability and services    Plan on reteam  Received 8 more days  Objective:    Functional Update:   PT: mod-maxA transfers, Tan bed mobility, total A ambulation (mod-max with CF) 50', min-modA wheelchair mobility  OT: Sup eating, maxA grooming, Ax2 bathing, maxA UB dressing, Ax2 LB dressing, Ax2 toileting and toilet transfers  SLP: mild cognitive impairment  Does well in [place]  Allergies per EMR    Physical Exam:  Temp:  [97 8 °F (36 6 °C)-98 1 °F (36 7 °C)] 97 9 °F (36 6 °C)  HR:  [60-63] 62  Resp:  [18] 18  BP: (119-133)/(55-62) 128/62  Oxygen Therapy  SpO2: 97 %    Gen: No acute distress, Well-nourished, well-appearing  HEENT: Moist mucus membranes, Normocephalic/Atraumatic  Cardiovascular: Distal pulses palpable  Heme/Extr: No edema  Pulmonary: Non-labored breathing  : No contreras  GI: Soft, non-tender, non-distended  Integumentary: Skin is warm, dry  Neuro: AAOx3, Speech is intact  Appropriate to questioning  Improved L arm spasticity  Psych: Normal mood and affect  Diagnostic Studies: Reviewed, no new imaging      Laboratory:  Reviewed   Results from last 7 days   Lab Units 08/29/22  0537 08/25/22  0452   HEMOGLOBIN g/dL 11 3* 11 6   HEMATOCRIT % 36 3 37 9   WBC Thousand/uL 10 21* 8 30     Results from last 7 days   Lab Units 08/29/22  0537 08/25/22  0452   BUN mg/dL 29* 32*   POTASSIUM mmol/L 4 3 4 5   CHLORIDE mmol/L 105 106   CREATININE mg/dL 1 10 1 05            Patient Active Problem List   Diagnosis    Other chest pain    Essential hypertension    Hyperlipidemia LDL goal <100    Stage 3 chronic kidney disease (HCC)    Osteoarthritis of both wrists    Chronic atrial fibrillation (HCC)    Tubulovillous adenoma    Gastroesophageal reflux disease without esophagitis    Pacemaker    B12 deficiency    Allergic rhinitis due to pollen    Cavus deformity of foot    Midline cystocele    Hallux abducto valgus, bilateral    Left renal artery stenosis (HCC)    Osteoarthritis of hip    Osteopenia    Pain due to onychomycosis of toenails of both feet    Left atrial enlargement    Lipoma of right upper extremity    Diverticulosis of colon    Abnormal nuclear stress test    Renal insufficiency    Arterial ischemic stroke, MCA (middle cerebral artery), right, acute (HCC)    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)    Anemia    At risk for venous thromboembolism (VTE)    DARRIUS (acute kidney injury) ([facility] [address] )    Spastic hemiparesis of left dominant side as late effect of cerebral infarction (HCC)    Valvular heart disease    CAD (coronary artery disease)    Urinary retention    Neck pain    Healthcare maintenance    At risk for coping difficulty         Medications  Current Facility-Administered Medications   Medication Dose Route Frequency Provider Last Rate    acetaminophen  650 mg Oral Q6H PRN Clifton Senior MD      baclofen  5 mg Oral HS PRN Caryn Campoverde MD      baclofen  5 mg Oral 4x Daily Caryn Campoverde MD      benzonatate  200 mg Oral TID TOMY Ley      bisacodyl  10 mg Rectal Daily PRN Clifton Senior MD      dabigatran etexilate  150 mg Oral Q12H [address] Clifton Senior MD      docusate sodium  100 mg Oral BID Caryn Campoverde MD      famotidine  20 mg Oral Daily Caryn Campoverde MD      guaiFENesin  200 mg Oral Q6H PRN TOMY Ley      losartan  100 mg Oral Daily Clifton Senior MD      magnesium oxide 400 mg Oral Daily Meredith Soriano MD      melatonin  3 mg Oral QPM Marla Calle MD      menthol-methyl salicylate   Apply externally BID Marla Calle MD      miconazole   Topical BID PRN TOMY العلي      And    miconazole  200 mg Vaginal HS TOMY العلي      nebivolol  5 mg Oral Daily Meredith Soriano MD      nitroglycerin  0 4 mg Sublingual Q5 Min PRN TOMY Welsh      phenol  1 spray Mouth/Throat Q2H PRN Marla Calle MD      polyethylene glycol  17 g Oral Daily PRN Meredith Soriano MD      pravastatin  40 mg Oral Daily With Dwayne Duff MD      tamsulosin  0 4 mg Oral Daily With Chrissie Ruiz MD      verapamil  240 mg Oral HS Meredith Soriano MD            ** Please Note: Fluency Direct voice to text software may have been used in the creation of this document   **

## 2022-08-31 NOTE — PLAN OF CARE
Problem: MOBILITY - ADULT  Goal: Maintain or return to baseline ADL function  Description: INTERVENTIONS:  -  Assess patient's ability to carry out ADLs; assess patient's baseline for ADL function and identify physical deficits which impact ability to perform ADLs (bathing, care of mouth/teeth, toileting, grooming, dressing, etc )  - Assess/evaluate cause of self-care deficits   - Assess range of motion  - Assess patient's mobility; develop plan if impaired  - Assess patient's need for assistive devices and provide as appropriate  - Encourage maximum independence but intervene and supervise when necessary  - Involve family in performance of ADLs  - Assess for home care needs following discharge   - Consider OT consult to assist with ADL evaluation and planning for discharge  - Provide patient education as appropriate  Outcome: Progressing  Goal: Maintains/Returns to pre admission functional level  Description: INTERVENTIONS:  - Set and communicate daily mobility goal to care team and patient/family/caregiver  - Collaborate with rehabilitation services on mobility goals if consulted  - Perform Range of Motion 3 times a day  - Reposition patient every 2 hours    - Dangle patient 3 times a day  - Stand patient 3 times a day  - Ambulate patient 3 times a day  - Out of bed to chair 3 times a day   - Out of bed for meals 3 times a day  - Out of bed for toileting  - Record patient progress and toleration of activity level   Outcome: Progressing     Problem: Prexisting or High Potential for Compromised Skin Integrity  Goal: Skin integrity is maintained or improved  Description: INTERVENTIONS:  - Identify patients at risk for skin breakdown  - Assess and monitor skin integrity  - Assess and monitor nutrition and hydration status  - Monitor labs   - Assess for incontinence   - Turn and reposition patient  - Assist with mobility/ambulation  - Relieve pressure over bony prominences  - Avoid friction and shearing  - Provide appropriate hygiene as needed including keeping skin clean and dry  - Evaluate need for skin moisturizer/barrier cream  - Collaborate with interdisciplinary team   - Patient/family teaching  - Consider wound care consult   Outcome: Progressing     Problem: Potential for Falls  Goal: Patient will remain free of falls  Description: INTERVENTIONS:  - Educate patient/family on patient safety including physical limitations  - Instruct patient to call for assistance with activity   - Consult OT/PT to assist with strengthening/mobility   - Keep Call bell within reach  - Keep bed low and locked with side rails adjusted as appropriate  - Keep care items and personal belongings within reach  - Initiate and maintain comfort rounds  - Make Fall Risk Sign visible to staff  - Offer Toileting every 2-4 Hours, in advance of need  - Initiate/Maintain bed/chair alarm  - Obtain necessary fall risk management equipment: nonskid footwear  - Apply yellow socks and bracelet for high fall risk patients  - Consider moving patient to room near nurses station  Outcome: Progressing     Problem: PAIN - ADULT  Goal: Verbalizes/displays adequate comfort level or baseline comfort level  Description: Interventions:  - Encourage patient to monitor pain and request assistance  - Assess pain using appropriate pain scale  - Administer analgesics based on type and severity of pain and evaluate response  - Implement non-pharmacological measures as appropriate and evaluate response  - Consider cultural and social influences on pain and pain management  - Notify physician/advanced practitioner if interventions unsuccessful or patient reports new pain  Outcome: Progressing     Problem: INFECTION - ADULT  Goal: Absence or prevention of progression during hospitalization  Description: INTERVENTIONS:  - Assess and monitor for signs and symptoms of infection  - Monitor lab/diagnostic results  - Monitor all insertion sites, i e  indwelling lines, tubes, and drains  - Monitor endotracheal if appropriate and nasal secretions for changes in amount and color  - [place] appropriate cooling/warming therapies per order  - Administer medications as ordered  - Instruct and encourage patient and family to use good hand hygiene technique  - Identify and instruct in appropriate isolation precautions for identified infection/condition  Outcome: Progressing     Problem: DISCHARGE PLANNING  Goal: Discharge to home or other facility with appropriate resources  Description: INTERVENTIONS:  - Identify barriers to discharge w/patient and caregiver  - Arrange for needed discharge resources and transportation as appropriate  - Identify discharge learning needs (meds, wound care, etc )  - Arrange for interpretive services to assist at discharge as needed  - Refer to Case Management Department for coordinating discharge planning if the patient needs post-hospital services based on physician/advanced practitioner order or complex needs related to functional status, cognitive ability, or social support system  Outcome: Progressing

## 2022-08-31 NOTE — PHYSICAL THERAPY NOTE
Stroke Education Series    Pt participated in skilled Stroke Education Series in an individualized setting to address the topic of Preparing To Go Home  This education was provided in both verbal and written formats  Education within this session focused on providing safety strategies including environmental and lifestyle changes that if made will support a safe discharge to his/her home setting  One goal of this session is to focus on ways to improve the patient's independence while maintaining safety and decreasing fall risk  Following education, pt's response to education is: needs reinforcment   To individualize this session, the following topics were reviewed: caregiver considerations, assistive devices and emergency preparedness  Start Time: 1500    End Time: 1      Stroke Education Series    Pt participated in skilled Stroke Education Series in an individualized setting to address the topic of What Comes Next post stroke in both verbal and written formats  Education within this session included information on recommendations and resources after leaving the [facility]  This education session links together what has been covered in previous stroke education classes to improve the patient's understanding of how managing risk factors for stroke, participating in therapy, working with family and friends in the rehab process, and reviewing their role in the rehab process will maximize outcomes and their functional gains  This education also incorporates what the patient wants to achieve and helps them set realistic goals  To individualize this education the following topics were covered: continued therapy (home versus outpatient) and caregiver support  Following education, pt's response to education is: needs reinforcment         Start Time: 1515    End Time: 9696

## 2022-08-31 NOTE — PROGRESS NOTES
OT Daily Treatment Note       08/31/22 1300   Pain Assessment   Pain Assessment Tool 0-10   Pain Score No Pain   Restrictions/Precautions   Precautions Bed/chair alarms;Cognitive; Fall Risk;Pain;Supervision on toilet/commode   Lifestyle   Autonomy "I have many accidents today, I just want to stay in bed"   Coordination   Fine Motor see treatment assessment for further details   Cognition   Overall Cognitive Status Impaired   Arousal/Participation Alert; Cooperative   Attention Attends with cues to redirect   Orientation Level Oriented X4   Memory Decreased short term memory;Decreased recall of recent events   Following Commands Follows one step commands with increased time or repetition   Activity Tolerance   Activity Tolerance Patient tolerated treatment well   Assessment   Treatment Assessment Pt completed 90 minute session focusing on fine motor strength and coordination  At start of session, pt stated that nursing gave her medication to increase BM frequency  Because of that medication, pt stated that she has had multiple "accidents" of bowel incontinence and it was causing her stomach some discomfort so the pt wanted to complete in bed tasks only  OT offered to assist pt to bathroom to see if she needed to have another BM, pt refused stating she is fine  Pt completed multiple fine motor tasks focusing on pincer, dynamic tripod and palmar grasp  Pt demonstrated good technique with grasp but required cues to cease activity  For example, pt would complete picking up small items with tweezers and when finished, she would not set the tweezers down on the table without tactile cues  Pt then completed a 25 piece puzzle with focusing on visual scanning to L side along with utilizing only LUE to piece puzzles together  Pt req inc time to complete due to mild L sided inattention, requiring cues to scan further left to find the pieces she needs  Towards the end of the session, pts best friend came to visit   OT began creating a UE HEP for in room  Will f/u with pt to review HEP extensively  Pt continues to be limited by her LUE tone, L inattention and weakness  Pt would benefit from continued OT services to increase independence and safety with ADL completion  Prognosis Fair   Problem List Decreased strength;Decreased endurance; Impaired balance;Decreased mobility; Decreased coordination;Decreased range of motion; Impaired tone;Pain   Barriers to Discharge Inaccessible home environment;Decreased caregiver support   Plan   Treatment/Interventions ADL retraining;Functional transfer training; Therapeutic exercise; Endurance training;Cognitive reorientation;Patient/family training; Compensatory technique education   Progress Progressing toward goals   Recommendation   OT Discharge Recommendation   (pending progress)   OT Therapy Minutes   OT Time In 1300   OT Time Out 1430   OT Total Time (minutes) 90   OT Mode of treatment - Individual (minutes) 90   OT Mode of treatment - Concurrent (minutes) 0   OT Mode of treatment - Group (minutes) 0   OT Mode of treatment - Co-treat (minutes) 0   OT Mode of Treatment - Total time(minutes) 90 minutes   OT Cumulative Minutes 940   Therapy Time missed   Time missed?  No

## 2022-08-31 NOTE — PROGRESS NOTES
Physical Medicine and Rehabilitation Progress Note  John Webster 80 y o  female MRN: [id number]  Unit/Bed#: -18 Encounter: [id number]    HPI: 80-year-old female with a past medical history of AFib recently on Xarelto, sick sinus syndrome status status post pacemaker placement that is not MRI compatible, hypertension, hyperlipidemia, valvular disease, coronary artery disease developed dense left-sided weakness, dysarthria and presented to the hospital on 08/12/2022  She had systolic blood pressure of 200 on arrival   CT head showed focal area of gliosis within the high right post central gyrus and scattered chronic microvascular ischemic changes with more focal lucency along the anterior limb of the right internal capsule  CTA of head and neck showed near occlusive thrombus at the right MCA bifurcation, mild beating of the mid to distal ICA suspicious mild fibromuscular dysplasia and mild atherosclerotic disease at the bilateral distal carotid arteries  She was not a candidate for MRI due to pacemaker  CT cerebral perfusion study showed 8 mL of mismatch  She was not a candidate for tPA due to bleeding risk from being on Xarelto  She was not a thrombectomy candidate for neurointerventional   Patient was recommended to transition to Pradaxa  Patient was evaluated by skilled therapies and was found to have significant decline in ADLs and ambulation and appears appropriate for admission to [place] [address]  Chief Complaint: No new issues  Interval History/Subjective:  No acute events overnight  Tone and AROM in LUE improving  Participating well with therapy  Still with neck pain along upper trapezius that improves with Bengay and manual therapies  Heat makes it worse  Denies any new headache, lightheadedness, difficulty swallowing, CP, SOB, fevers, chills, N/V, abdominal pain  Cough seems better today  Last  8/28 Bladder scans and volumes improving       ROS:  A 10 point review of systems was negative except for what is noted in the HPI  Today's Changes:  1  Awaiting to hear back from Pharmacy re: botox  Most likely this will need to be done outpatient  2  Continue to monitor bladder function off flomax  3  She will have limited support available at home  4  Team Conference  5  Discussed with SLP - swallow is fine  Total time spent:  35 minutes, with more than 50% spent counseling/coordinating care  Counseling includes discussion with patient re: progress in therapies, functional issues observed by therapy staff, and discussion with patient his/her current medical state/wellbeing  Coordination of patient's care was performed in conjunction with Internal Medicine service to monitor patient's labs, vitals, and management of their comorbidities  In addition, this patient was discussed by the interdisciplinary team in weekly case conference today  The care of the patient was extensively discussed with all care providers and an appropriate rehabilitation plan was formulated unique for this patient  Barriers were identified preventing progression of therapy and appropriate interventions were discussed with each discipline  Please see the team note for input from all disciplines regarding barriers, intervention, and discharge planning  Assessment/Plan:    * Arterial ischemic stroke, MCA (middle cerebral artery), right, acute (HCC)  Assessment & Plan  - R MCA CVA   - CTA head/neck - near occlusive thrombus at the R MCA bifurcation, mild beading of the mid to distal ICAs suspicious for mild fibromuscular dysplasia, and mild atherosclerotic disease at bilateral distal carotid arteries     - no MRI because pacemaker not compatible    - Residual impairments on admission to [facility]: L spastic hemiparesis, bowel/bladder dysfunction, possible cog impairments (assess for other impairments during ARC course)     Secondary stroke prevention  - repeat CT angio head and neck in 3 months to re-evaluate thrombus is a outpatient  - Antithrombotic: Pradaxa (switched from Xarelto for possible failure)   - Statin  - Optimal management of blood pressure and diabetes  - Patient at increased risk for stroke particularly early in post-stroke period - monitor neuro exam closely with low threshold to repeat imaging  -  patient and if applicable caregiver on optimal stroke management  - Follow-up with neurology and PCP after d/c   - Recommend acute comprehensive interdisciplinary inpatient rehabilitation to include intensive skilled therapies (PT, OT, ST) as outlined with oversight and management by rehabilitation physician as well as inpatient rehab level nursing, case management and weekly interdisciplinary team meetings  - patient does have fair left hand and wrist strength; can use resting hand splint for a few hours during the day and overnight but to not overuse and encourage use hand and wrist  - multi Podus boot on left foot patient can breaks to potentially decrease risk of contracture/skin breakdown   - patient has spastic left upper extremity making subluxation less likely although continue subluxation precautions      At risk for coping difficulty  Assessment & Plan  Supportive counseling  Psychology consult while in [facility]  Discharged on magnesium daily   Continue for now  Monitor level  Neck pain  Assessment & Plan  With taut muscle bands in left cervical paraspinal and trapezius areas  Gentle stretching  Baclofen 5 mg QID  Monitor closely given GFR  Bengay helpful  Heat not helpful  Manual therapies/modalities helpful   PRN APAP    Urinary retention  Assessment & Plan  Was using a purewick  On 8/25 was noted not to have any urine output overnight by nursing    - Bladder scan was >500cc  Ultimately catheterized for 1600cc     - UA negative   - Starting Q6hr scan/cath program  Volumes appropriate on this, but still needing catheterization on occasion    - Consider starting tamsulosin  - 8/29 showed some improvement with timed voids  - monitor for retention, incontinence, signs/symptoms of UTI  - ensure optimal bowel management   - recommend toileting program Q2-4 H during day and Q4-6H overnight   - bladder scan Q6H, monitor output and PVRs, straight cath >450 or if uncomfortable 250-449      CAD (coronary artery disease)  Assessment & Plan  IM consulted and with overall management at their discretion during ARC course  Antithrombotic: Pradaxa  Statin  Optimal blood pressure   Outpatient Cardiology follow-up    Per IM  · "Nonobstructive  · Card cath 8/2021 done for borderline abn stress test and found 50% mid RCA/40% mid LAD = no intervention done  · Continue statin  · Was not on ASA as an OP"       Valvular heart disease  Assessment & Plan  Per IM  · "Current ECHO:  LVEF 65%, mod AR/mild AS, mod TR with severely elevated RV systolic pressure, mild-mod MR, mod LAE/mild NEO  · Euvolemic currently but will watch since CTA on admission showed pulm edema and b/l pleural effusions"  -diuretics at their discretion   -Outpatient cardiology follow-up    Spastic hemiparesis of left dominant side as late effect of cerebral infarction [facility])  Assessment & Plan  Left upper extremity significantly (also some L neck tightness/spasms)  Modified Sada scale 3 in shoulder, and 2 in her elbow  - Has flexion synergy    - Has clonus in pronators and wrist flexors  Baclofen 5mg increased to QID  Monitor given GFR  May be more sleepy initially  Reached out to [employer] rep to see if we can get samples for inpatient administration of botulinum toxin   - 75 units lateral pec, 75 units biceps, 25 units FCR, 25 units pronator teres for total 200 units     - Have not been able to get Botox inpatient     Gentle range of motion with therapy  Patient does have movement of the hand and wrist and do want to encourage that - can still consider resting hand splint intermittently  Skilled PT OT  Optimal skin hygiene    DARRIUS (acute kidney injury) [facility])  Assessment & Plan  Resolved  8/29 Crea 1 1  Medicine consulted  Hold Lasix and Aldactone  Resume diuretics at discretion of medicine  Monitor labs and output    At risk for venous thromboembolism (VTE)  Assessment & Plan  SCDs, ambulation, and Pradaxa       Pacemaker  Assessment & Plan  Not MRI compatible    Chronic atrial fibrillation (HCC)  Assessment & Plan  IM consulted and with overall management at their discretion during ARC course  Rate control: Verapamil, Nebivolol   Antithrombotic: Pradaxa       Stage 3 chronic kidney disease [facility])  Assessment & Plan  Lab Results   Component Value Date    EGFR 46 08/29/2022    EGFR 49 08/25/2022    EGFR 49 08/22/2022    CREATININE 1 10 08/29/2022    CREATININE 1 05 08/25/2022    CREATININE 1 06 08/22/2022     Given IVF  Crea now back down to ~1 06 near baseline  Avoid nephrotoxic meds  Monitor voiding status  IM consulted  Hyperlipidemia LDL goal <100  Assessment & Plan  Statin    Essential hypertension  Assessment & Plan  Internal medicine consulted and co-management with their service  Monitor vitals with and without activity; monitor for orthostasis  Monitor hemoglobin, electrolytes, kidney function, hydration status   Current meds:  Losartan, nebivolol, Verapamil 240mg daily  - Lasix and Aldactone on hold      Other chest pain  Assessment & Plan  Resolved  8/25 with chest pain, ultimately felt to be 2/2 to spasticity  Improved with baclofen/robaxin  - delta hsTrop at 2 hr was -1    - EKG showed paced rhythm, no evidence of ischemia   - CXR unremarkable   - 30 min later had another episode that felt more like pressure  Rapid called, but not concerning for ACS  Generally reproducible   - Increased Baclofen and frequency of Margyves Nancy  Has a cough with it, and lungs sound good, and CXR unremarkable  - May also be component of GERD  Started on pepcid  Also on throat spray  Fluticasone was irritating and didn't make a difference    - Had SLP eval swallow - which was fine  Health Maintenance  #Delirium/Sleep: At risk  Environmental interventions  Optimize pain, bowel, bladder, sleep-wake cycle management  #Pain: Tylenol PRN  #Bowel: Last BM 8/28 and continent  Not on regimen - only PRNs  #Skin/Pressure Injury Prevention: Turn Q2hr in bed, with weight shifts [id number] in wheelchair  #DVT Prophylaxis: Fully anticoagulated on pradaxa  #GI Prophylaxis: PO diet   #Code Status: Full Code  #FEN: Reg/Thins  #Dispo: Team Conference 8/30: Patient adamant not to go to SNF, but will need assistance at discharge  Had caregivers at home, but unclear what services they can provide  Goals are sup-Tan function  Son is aware will provide us info on current caregivers to clarify availability and services    Plan on reteam  Received 8 more days  Objective:    Functional Update:   PT: mod-maxA transfers, Tan bed mobility, total A ambulation (mod-max with CF) 50', min-modA wheelchair mobility  OT: Sup eating, maxA grooming, Ax2 bathing, maxA UB dressing, Ax2 LB dressing, Ax2 toileting and toilet transfers  SLP: mild cognitive impairment  Does well in [place]  Allergies per EMR    Physical Exam:  Temp:  [97 4 °F (36 3 °C)-98 1 °F (36 7 °C)] 98 1 °F (36 7 °C)  HR:  [60-67] 63  Resp:  [17-20] 18  BP: (101-133)/(54-62) 123/60  Oxygen Therapy  SpO2: (!) 72 %    Gen: No acute distress, Well-nourished, well-appearing  HEENT: Moist mucus membranes, Normocephalic/Atraumatic  Cardiovascular: Regular rate, rhythm, S1/S2  Distal pulses palpable  Heme/Extr: No edema  Pulmonary: Non-labored breathing  : No contreras  GI: Soft, non-tender, non-distended  MSK: Markedly improved AROM in shoulder flexion/scaption, and actually abduction to shoulder level today  Integumentary: Skin is warm, dry  Neuro: AAOx3,Speech is intact  Appropriate to questioning  L spastic hemiparesis     Psych: Normal mood and affect  Diagnostic Studies: Reviewed, no new imaging      Laboratory:  Reviewed   Results from last 7 days   Lab Units 08/29/22  0537 08/25/22  0452   HEMOGLOBIN g/dL 11 3* 11 6   HEMATOCRIT % 36 3 37 9   WBC Thousand/uL 10 21* 8 30     Results from last 7 days   Lab Units 08/29/22  0537 08/25/22  0452   BUN mg/dL 29* 32*   POTASSIUM mmol/L 4 3 4 5   CHLORIDE mmol/L 105 106   CREATININE mg/dL 1 10 1 05            Patient Active Problem List   Diagnosis    Other chest pain    Essential hypertension    Hyperlipidemia LDL goal <100    Stage 3 chronic kidney disease (HCC)    Osteoarthritis of both wrists    Chronic atrial fibrillation (HCC)    Tubulovillous adenoma    Gastroesophageal reflux disease without esophagitis    Pacemaker    B12 deficiency    Allergic rhinitis due to pollen    Cavus deformity of foot    Midline cystocele    Hallux abducto valgus, bilateral    Left renal artery stenosis (HCC)    Osteoarthritis of hip    Osteopenia    Pain due to onychomycosis of toenails of both feet    Left atrial enlargement    Lipoma of right upper extremity    Diverticulosis of colon    Abnormal nuclear stress test    Renal insufficiency    Arterial ischemic stroke, MCA (middle cerebral artery), right, acute (HCC)    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)    Anemia    At risk for venous thromboembolism (VTE)    DARRIUS (acute kidney injury) ([address] )    Spastic hemiparesis of left dominant side as late effect of cerebral infarction ([address] )    Valvular heart disease    CAD (coronary artery disease)    Urinary retention    Neck pain    Healthcare maintenance    At risk for coping difficulty         Medications  Current Facility-Administered Medications   Medication Dose Route Frequency Provider Last Rate    acetaminophen  650 mg Oral Q6H PRN Clifton Senior MD      baclofen  5 mg Oral HS PRN Caryn Campoverde MD      baclofen  5 mg Oral 4x Daily MD Teresita Joseph benzonatate  200 mg Oral TID TOMY Day      bisacodyl  10 mg Rectal Daily PRN Tania Okeefe MD      dabigatran etexilate  150 mg Oral Q12H [address] Tania Okeefe MD      docusate sodium  100 mg Oral BID Dylon Choi MD      famotidine  20 mg Oral Daily Dylon Choi MD      guaiFENesin  200 mg Oral Q6H PRN TOMY Day      losartan  100 mg Oral Daily Tania Okeefe MD      magnesium oxide  400 mg Oral Daily Tania Okeefe MD      melatonin  3 mg Oral QPM Dylon Choi MD      menthol-methyl salicylate   Apply externally BID Dylon Choi MD      miconazole   Topical BID PRN TOMY Day      And    miconazole  200 mg Vaginal HS TOMY Day      nebivolol  5 mg Oral Daily Tania Okeefe MD      nitroglycerin  0 4 mg Sublingual Q5 Min PRN SatiTOMY Arzate      phenol  1 spray Mouth/Throat Q2H PRN Dylon Choi MD      polyethylene glycol  17 g Oral Daily PRN Tania Okeefe MD      pravastatin  40 mg Oral Daily With Yoana Hoskins MD      verapamil  240 mg Oral HS Tania Okeefe MD            ** Please Note: Flu[employer] Direct voice to text software may have been used in the creation of this document   **

## 2022-08-31 NOTE — PROGRESS NOTES
Internal Medicine Progress Note  Patient: Enrike Alvarado  Age/sex: 80 y o  female  Medical Record #: [id number]      ASSESSMENT/PLAN: (Interval History)  Enrike Alvarado is seen and examined and management for following issues:    Acute embolic right MCA CVA  · Had near occlusion of right MCA  · Felt to be a Xarelto failure and was switched to Pradaxa  · ECHO w/o thrombus  · Continue statin  · On Baclofen for tone left arm = much improved     Chronic atrial fibrillation  · Sees Dr Margret Bentley as OP  · Rates controlled  · On Pradaxa now, Xarelto stopped     PPM  · VVI  · Is not MRI conditional     Valvular disease  · Current ECHO:  LVEF 65%, mod AR/mild AS, mod TR with severely elevated RV systolic pressure, mild-mod MR, mod LAE/mild NEO  · Euvolemic currently     HTN  · Home:  Verapamil 240mg qhs/Cozaar 520 mg qd/Bystolic 5mg qd/lasix 33GT qd/Aldactone 25mg qd  · Here:  Verapamil 240mg qhs/Cozaar 477WH qd/Bystolic 5mg qd  · OP note states has left RA stenosis but no testing in OP records or Care Everywhere to verify  · No changes again today     HLD  · Home:  Crestor 10mg qd = Neuro rec when discharged to reduce to 5mg qd  · Here:  Pravachol 40mg qd     DARRIUS  · S/p NS 1 liter   · Back to baseline  · Continue to hold Lasix and Aldactone for now     CAD  · Nonobstructive  · Card cath 8/2021 done for borderline abn stress test and found 50% mid RCA/40% mid LAD = no intervention done  · Continue statin  · Was not on ASA as an OP     Hypomagnesemia  · Takes here and at home 400 mg MagOx  · Mg level 8/20 was 2 4     Left chest pain  · Had left neck pain (not new for her) then pain under left breast = reproducible with palpation  · EKG was 100% v-paced but no obvious acute changes; trops negative Bengay applied  · At the time, felt likely M/S from spasm  She then had worsening pain in chest and described pressure so RR called    · Trops = 29/-, 28/-1, 28/-1  · Etio was her spasticity   · Dr Joce elder Baclofen to 5mg QID on 8/29  Has 5mg qhs prn as well       Cough  · Have not witnessed her coughing but she endorses  · Has some clear-whitish secretions  · CXR negative 8/25  · Dr Hakan Salas added Pepcid in case etio of cough is 2/2 GERD  · Has prn Robitussin   · Was on Flonase = stopped since not effective  · Inc tessalon perles to 200mg TID 8/28  · Has been on the Cozaar for quite a while she says  · Appears to be coughing on saliva per ST but she did well otherwise with their eval and they did not change her diet     Urine retention  · Getting str cath'd  · Management per PMR  · Flomax added today     Vaginal yeast infection  · Is receiving Monistat pack and gave 1 dose of Diflucan 150mg         DC planning: Reteam       The above assessment and plan was reviewed and updated as determined by my evaluation of the patient on 8/31/2022      Labs:   Results from last 7 days   Lab Units 08/29/22  0537 08/25/22  0452   WBC Thousand/uL 10 21* 8 30   HEMOGLOBIN g/dL 11 3* 11 6   HEMATOCRIT % 36 3 37 9   PLATELETS Thousands/uL 317 386     Results from last 7 days   Lab Units 08/29/22  0537 08/25/22  0452   SODIUM mmol/L 135 134*   POTASSIUM mmol/L 4 3 4 5   CHLORIDE mmol/L 105 106   CO2 mmol/L 26 24   BUN mg/dL 29* 32*   CREATININE mg/dL 1 10 1 05   CALCIUM mg/dL 9 3 9 3                   Review of Scheduled Meds:  Current Facility-Administered Medications   Medication Dose Route Frequency Provider Last Rate    acetaminophen  650 mg Oral Q6H PRN Coleman Snider MD      baclofen  5 mg Oral HS PRN Martha Loya MD      baclofen  5 mg Oral 4x Daily Martha Loya MD      benzonatate  200 mg Oral TID TOMY Serrano      bisacodyl  10 mg Rectal Daily PRN Coleman Snider MD      dabigatran etexilate  150 mg Oral Q12H [address] Coleman Snider MD      docusate sodium  100 mg Oral BID Martha Loya MD      famotidine  20 mg Oral Daily Martha Loya MD      guaiFENesin  200 mg Oral Q6H PRN TOMY Serrano      losartan 100 mg Oral Daily Gutierrez Love MD      magnesium oxide  400 mg Oral Daily Gutierrez Love MD      melatonin  3 mg Oral QPM Hannah Barraza MD      menthol-methyl salicylate   Apply externally BID Hannah Barraza MD      miconazole   Topical BID PRN TOMY Tadeo      And    miconazole  200 mg Vaginal HS TOMY Braden      nebivolol  5 mg Oral Daily Gutierrez Love MD      nitroglycerin  0 4 mg Sublingual Q5 Min PRN TOMY Fiore      phenol  1 spray Mouth/Throat Q2H PRN Hannah Barraza MD      polyethylene glycol  17 g Oral Daily PRN Gutierrez Love MD      pravastatin  40 mg Oral Daily With Kosta Mireles MD      tamsulosin  0 4 mg Oral Daily With Tam Blood MD      verapamil  240 mg Oral HS Gutierrez Love MD         Subjective/ HPI: Patient seen and examined  Patients overnight issues or events were reviewed with nursing or staff during rounds or morning huddle session  New or overnight issues include the following:     No new or overnight issues  Offers no complaints    ROS:   A 10 point ROS was performed; negative except as noted above       *Labs /Radiology studies reviewed  *Medications reviewed and reconciled as needed  *Please refer to order section for additional ordered labs studies  *Case discussed with primary attending during morning huddle case rounds    Physical Examination:  Vitals:   Vitals:    08/30/22 1326 08/30/22 2125 08/31/22 0700 08/31/22 0815   BP: 118/62 123/60 119/55 133/60   BP Location: Right arm Right arm Right arm Left arm   Pulse: 67 63 61 60   Resp: 20 18 18    Temp: 97 8 °F (36 6 °C) 98 1 °F (36 7 °C) 97 8 °F (36 6 °C)    TempSrc: Oral Oral Oral    SpO2: 99% 95% 99%    Weight:   64 3 kg (141 lb 12 1 oz)    Height:           General Appearance: no distress, conversive  HEENT: PERRLA, conjuctiva normal; oropharynx clear; mucous membranes moist   Neck:  Supple, normal ROM  Lungs: CTA, normal respiratory effort, no retractions, expiratory effort normal  CV: regular rate and rhythm; no rubs/murmurs/gallops, PMI normal   ABD: soft; ND/NT; +BS  EXT: no edema  Skin: normal turgor, normal texture, no rashes  Psych: affect normal, mood normal  Neuro: AAO      The above physical exam was reviewed and updated as determined by my evaluation of the patient on 8/31/2022  Invasive Devices  Report    None                    VTE Pharmacologic Prophylaxis: Pradaxa  Code Status: Level 1 - Full Code  Current Length of Stay: 12 day(s)      Total time spent:  30 minutes with more than 50% spent counseling/coordinating care  Counseling includes discussion with patient re: progress  and discussion with patient of his/her current medical state/information  Coordination of patient's care was performed in conjunction with primary service  Time invested included review of patient's labs, vitals, and management of their comorbidities with continued monitoring  In addition, this patient was discussed with medical team including physician and advanced extenders  The care of the patient was extensively discussed and appropriate treatment plan was formulated unique for this patient  ** Please Note:  voice to text software may have been used in the creation of this document   Although proof errors in transcription or interpretation are a potential of such software**

## 2022-09-01 LAB
ANION GAP SERPL CALCULATED.3IONS-SCNC: 4 MMOL/L (ref 4–13)
BASOPHILS # BLD AUTO: 0.04 THOUSANDS/ΜL (ref 0–0.1)
BASOPHILS NFR BLD AUTO: 1 % (ref 0–1)
BUN SERPL-MCNC: 28 MG/DL (ref 5–25)
CALCIUM SERPL-MCNC: 9.1 MG/DL (ref 8.3–10.1)
CHLORIDE SERPL-SCNC: 106 MMOL/L (ref 96–108)
CO2 SERPL-SCNC: 26 MMOL/L (ref 21–32)
CREAT SERPL-MCNC: 1.04 MG/DL (ref 0.6–1.3)
EOSINOPHIL # BLD AUTO: 0.06 THOUSAND/ΜL (ref 0–0.61)
EOSINOPHIL NFR BLD AUTO: 1 % (ref 0–6)
ERYTHROCYTE [DISTWIDTH] IN BLOOD BY AUTOMATED COUNT: 14.2 % (ref 11.6–15.1)
GFR SERPL CREATININE-BSD FRML MDRD: 50 ML/MIN/1.73SQ M
GLUCOSE P FAST SERPL-MCNC: 99 MG/DL (ref 65–99)
GLUCOSE SERPL-MCNC: 99 MG/DL (ref 65–140)
HCT VFR BLD AUTO: 36.8 % (ref 34.8–46.1)
HGB BLD-MCNC: 11.7 G/DL (ref 11.5–15.4)
IMM GRANULOCYTES # BLD AUTO: 0.03 THOUSAND/UL (ref 0–0.2)
IMM GRANULOCYTES NFR BLD AUTO: 0 % (ref 0–2)
LYMPHOCYTES # BLD AUTO: 1.35 THOUSANDS/ΜL (ref 0.6–4.47)
LYMPHOCYTES NFR BLD AUTO: 20 % (ref 14–44)
MAGNESIUM SERPL-MCNC: 2.1 MG/DL (ref 1.6–2.6)
MCH RBC QN AUTO: 30.1 PG (ref 26.8–34.3)
MCHC RBC AUTO-ENTMCNC: 31.8 G/DL (ref 31.4–37.4)
MCV RBC AUTO: 95 FL (ref 82–98)
MONOCYTES # BLD AUTO: 0.64 THOUSAND/ΜL (ref 0.17–1.22)
MONOCYTES NFR BLD AUTO: 9 % (ref 4–12)
NEUTROPHILS # BLD AUTO: 4.8 THOUSANDS/ΜL (ref 1.85–7.62)
NEUTS SEG NFR BLD AUTO: 69 % (ref 43–75)
NRBC BLD AUTO-RTO: 0 /100 WBCS
PLATELET # BLD AUTO: 263 THOUSANDS/UL (ref 149–390)
PMV BLD AUTO: 10.3 FL (ref 8.9–12.7)
POTASSIUM SERPL-SCNC: 4.3 MMOL/L (ref 3.5–5.3)
RBC # BLD AUTO: 3.89 MILLION/UL (ref 3.81–5.12)
SODIUM SERPL-SCNC: 136 MMOL/L (ref 135–147)
WBC # BLD AUTO: 6.92 THOUSAND/UL (ref 4.31–10.16)

## 2022-09-01 PROCEDURE — 99232 SBSQ HOSP IP/OBS MODERATE 35: CPT | Performed by: INTERNAL MEDICINE

## 2022-09-01 PROCEDURE — 83735 ASSAY OF MAGNESIUM: CPT | Performed by: PHYSICAL MEDICINE & REHABILITATION

## 2022-09-01 PROCEDURE — 97112 NEUROMUSCULAR REEDUCATION: CPT

## 2022-09-01 PROCEDURE — 80048 BASIC METABOLIC PNL TOTAL CA: CPT | Performed by: NURSE PRACTITIONER

## 2022-09-01 PROCEDURE — 85025 COMPLETE CBC W/AUTO DIFF WBC: CPT | Performed by: NURSE PRACTITIONER

## 2022-09-01 PROCEDURE — 97110 THERAPEUTIC EXERCISES: CPT

## 2022-09-01 PROCEDURE — 97130 THER IVNTJ EA ADDL 15 MIN: CPT

## 2022-09-01 PROCEDURE — 99232 SBSQ HOSP IP/OBS MODERATE 35: CPT | Performed by: PHYSICAL MEDICINE & REHABILITATION

## 2022-09-01 PROCEDURE — 97129 THER IVNTJ 1ST 15 MIN: CPT

## 2022-09-01 RX ADMIN — PRAVASTATIN SODIUM 40 MG: 40 TABLET ORAL at 16:12

## 2022-09-01 RX ADMIN — Medication 3 MG: at 20:25

## 2022-09-01 RX ADMIN — LOSARTAN POTASSIUM 100 MG: 50 TABLET, FILM COATED ORAL at 08:26

## 2022-09-01 RX ADMIN — MENTHOL, METHYL SALICYLATE: 10; 15 CREAM TOPICAL at 17:08

## 2022-09-01 RX ADMIN — DABIGATRAN ETEXILATE MESYLATE 150 MG: 150 CAPSULE ORAL at 20:25

## 2022-09-01 RX ADMIN — BENZONATATE 200 MG: 100 CAPSULE ORAL at 16:12

## 2022-09-01 RX ADMIN — TAMSULOSIN HYDROCHLORIDE 0.4 MG: 0.4 CAPSULE ORAL at 16:12

## 2022-09-01 RX ADMIN — BACLOFEN 5 MG: 10 TABLET ORAL at 08:26

## 2022-09-01 RX ADMIN — DABIGATRAN ETEXILATE MESYLATE 150 MG: 150 CAPSULE ORAL at 08:26

## 2022-09-01 RX ADMIN — BENZONATATE 200 MG: 100 CAPSULE ORAL at 22:00

## 2022-09-01 RX ADMIN — VERAPAMIL HYDROCHLORIDE 240 MG: 240 TABLET ORAL at 22:02

## 2022-09-01 RX ADMIN — BACLOFEN 5 MG: 10 TABLET ORAL at 22:01

## 2022-09-01 RX ADMIN — MAGNESIUM OXIDE TAB 400 MG (241.3 MG ELEMENTAL MG) 400 MG: 400 (241.3 MG) TAB at 08:26

## 2022-09-01 RX ADMIN — NEBIVOLOL 5 MG: 5 TABLET ORAL at 08:26

## 2022-09-01 RX ADMIN — FAMOTIDINE 20 MG: 20 TABLET, FILM COATED ORAL at 08:25

## 2022-09-01 RX ADMIN — MENTHOL, METHYL SALICYLATE: 10; 15 CREAM TOPICAL at 08:28

## 2022-09-01 RX ADMIN — BACLOFEN 5 MG: 10 TABLET ORAL at 11:57

## 2022-09-01 RX ADMIN — BACLOFEN 5 MG: 10 TABLET ORAL at 17:06

## 2022-09-01 RX ADMIN — BENZONATATE 200 MG: 100 CAPSULE ORAL at 08:25

## 2022-09-01 NOTE — PROGRESS NOTES
09/01/22 0800   Pain Assessment   Pain Assessment Tool 0-10   Pain Score No Pain   Restrictions/Precautions   Precautions Bed/chair alarms;Cognitive; Fall Risk;Supervision on toilet/commode  (spasticity)   Braces or Orthoses AFO  (with therapy)   Cognition   Overall Cognitive Status Impaired   Subjective   Subjective "i can't move this leg on my own"   Lying to Sitting on Side of Bed   Type of Assistance Needed Physical assistance   Physical Assistance Level 51%-75%   Comment encouraged to try on own   needs A with scooting to edge   Lying to Sitting on Side of Bed CARE Score 2   Sit to Stand   Type of Assistance Needed Physical assistance   Physical Assistance Level 26%-50%   Comment decrease fwd flex and ant wt shift   Sit to Stand CARE Score 3   Bed-Chair Transfer   Type of Assistance Needed Physical assistance   Physical Assistance Level 51%-75%   Comment min/modA sit pivot to w/c practiced to L and R   Chair/Bed-to-Chair Transfer CARE Score 2   Transfer Bed/Chair/Wheelchair   Findings practiced repeated STS x10 with pt's hands on therapist's shoulders to help faciliate ant wt shift    demonstrated improvements at end of session   Car Transfer   Reason if not Attempted Environmental limitations   Car Transfer CARE Score 10   Walk 10 Feet   Type of Assistance Needed Physical assistance   Physical Assistance Level Total assistance   Comment HHA; mod A/maxA for correction with LOB   Walk 10 Feet CARE Score 1   Walk 50 Feet with Two Turns   Type of Assistance Needed Physical assistance   Physical Assistance Level Total assistance   Comment HHA; mod A/maxA for correction with LOB   Walk 50 Feet with Two Turns CARE Score 1   Walk 150 Feet   Type of Assistance Needed Physical assistance   Physical Assistance Level Total assistance   Comment HHA; mod A/maxA for correction with LOB   Walk 150 Feet CARE Score 1   Ambulation   Distance Walked (feet) 200 ft  (x1; 100'x1)   Assist Device Hand Hold   Gait Pattern Inconsistant Linda; Slow Linda;Decreased foot clearance;L foot drag;Improper weight shift;Decreased L stance   Limitations Noted In Balance; Coordination; Endurance; Heel Strike; Safety; Sequencing;Speed;Strength;Swing   Findings intial walk with HHA on R decrease heel strike noted  therapist in front advancing L foot and cues for stride length  CF for safety from rehab aide   Curb or Single Stair   Style negotiated Single stair   Type of Assistance Needed Physical assistance; Adaptive equipment   Physical Assistance Level Total assistance   Comment B HRs   1 Step (Curb) CARE Score 1   4 Steps   Type of Assistance Needed Physical assistance; Adaptive equipment   Physical Assistance Level Total assistance   Comment Ax2, maxA   4 Steps CARE Score 1   12 Steps   Type of Assistance Needed Physical assistance; Adaptive equipment   Physical Assistance Level Total assistance   Comment Ax2 maxA   12 Steps CARE Score 1   Stairs   # of Steps 12   Assist Devices Bilateral Rail   Findings performance on stairs for neuro re-ed and gait training  pt utilized both hand rails, needs cueing to avance hands when going up and coming down  rehab aide guarding pt while therapist helping with advancement of LLE and glute/HS contraction/control   Picking Up Object   Reason if not Attempted Safety concerns   Picking Up Object CARE Score 88   Equipment Use   NuStep 8mins, needs A with increasing speed  Assessment   Treatment Assessment pt did well today working on improving STS and walking HHA and on stairs  tone seemed less at end of session  pt still remains high fall risk and below baseline  cont to work on neuro re-ed to [facility] gait training, txs and balance to decrease burden of care at time of d/c  Problem List Decreased strength;Decreased endurance; Impaired balance;Decreased mobility; Decreased coordination;Decreased range of motion; Impaired tone;Pain   Barriers to Discharge Inaccessible home environment;Decreased caregiver support   PT Barriers   Functional Limitation Car transfers; Ramp negotiation;Stair negotiation;Standing;Transfers; Walking; Wheelchair management   Plan   Progress Progressing toward goals   Recommendation   PT Discharge Recommendation   (pending progress)   Equipment Recommended Wheelchair   PT Therapy Minutes   PT Time In 0800   PT Time Out 0900   PT Total Time (minutes) 60   PT Mode of treatment - Individual (minutes) 60   PT Mode of treatment - Concurrent (minutes) 0   PT Mode of treatment - Group (minutes) 0   PT Mode of treatment - Co-treat (minutes) 0   PT Mode of Treatment - Total time(minutes) 60 minutes   PT Cumulative Minutes 1065   Therapy Time missed   Time missed?  No

## 2022-09-01 NOTE — PROGRESS NOTES
Physical Medicine and Rehabilitation Progress Note  Jm Morales 80 y o  female MRN: [id number]  Unit/Bed#: -32 Encounter: [id number]    HPI: 60-year-old female with a past medical history of AFib recently on Xarelto, sick sinus syndrome status status post pacemaker placement that is not MRI compatible, hypertension, hyperlipidemia, valvular disease, coronary artery disease developed dense left-sided weakness, dysarthria and presented to the hospital on 08/12/2022  She had systolic blood pressure of 200 on arrival   CT head showed focal area of gliosis within the high right post central gyrus and scattered chronic microvascular ischemic changes with more focal lucency along the anterior limb of the right internal capsule  CTA of head and neck showed near occlusive thrombus at the right MCA bifurcation, mild beating of the mid to distal ICA suspicious mild fibromuscular dysplasia and mild atherosclerotic disease at the bilateral distal carotid arteries  She was not a candidate for MRI due to pacemaker  CT cerebral perfusion study showed 8 mL of mismatch  She was not a candidate for tPA due to bleeding risk from being on Xarelto  She was not a thrombectomy candidate for neurointerventional   Patient was recommended to transition to Pradaxa  Patient was evaluated by skilled therapies and was found to have significant decline in ADLs and ambulation and appears appropriate for admission to [facility]  Chief Complaint: Cough  Happy she peed this morning with low PVR  Interval History/Subjective:  No acute events overnight  Had large volume urination this AM (nursing was unable to catch all of it on toilet), with PVR 84  Patient still has cough, some runny nose  Does not want nasal spray  Denies any history of seasonal allergies  Denies any CP, SOB, fevers, chills, N/V  Continues to spit up saliva  Cough medications do seem to help  Last BM was 9/1       ROS:  A 10 point review of systems was negative except for what is noted in the HPI  Today's Changes:  1  Continue flomax, may need to limit fluids or even consider placing a contreras if persistent  She tolerates catheterization, so would like to stick with this scheme for now due to lower risk of UTI  2  Strict Q6hr scan/cath  3  Mag 2 1   Continue daily mag  4  Reviewed labs, largely stable  5  Cough brings up mostly saliva  Lungs are clear  No new fevers/chills  Speech swallow eval was unremarkable  Continue cough meds as per IM  Total visit time: 25 minutes, with more than 50% spent counseling/coordinating care  Counseling includes discussion with patient re: progress in therapies, functional issues observed by therapy staff, and discussion with patient regarding their current medical state and wellbeing  Coordination of patient's care was performed in conjunction with Internal Medicine service to monitor patient's labs, vitals, and management of their comorbidities  Assessment/Plan:    * Arterial ischemic stroke, MCA (middle cerebral artery), right, acute (HCC)  Assessment & Plan  - R MCA CVA   - CTA head/neck - near occlusive thrombus at the R MCA bifurcation, mild beading of the mid to distal ICAs suspicious for mild fibromuscular dysplasia, and mild atherosclerotic disease at bilateral distal carotid arteries     - no MRI because pacemaker not compatible    - Residual impairments on admission to [facility]: L spastic hemiparesis, bowel/bladder dysfunction, possible cog impairments (assess for other impairments during ARC course)     Secondary stroke prevention  - repeat CT angio head and neck in 3 months to re-evaluate thrombus is a outpatient  - Antithrombotic: Pradaxa (switched from Xarelto for possible failure)   - Statin  - Optimal management of blood pressure and diabetes  - Patient at increased risk for stroke particularly early in post-stroke period - monitor neuro exam closely with low threshold to repeat imaging  -  patient and if applicable caregiver on optimal stroke management  - Follow-up with neurology and PCP after d/c   - Recommend acute comprehensive interdisciplinary inpatient rehabilitation to include intensive skilled therapies (PT, OT, ST) as outlined with oversight and management by rehabilitation physician as well as inpatient rehab level nursing, case management and weekly interdisciplinary team meetings  - patient does have fair left hand and wrist strength; can use resting hand splint for a few hours during the day and overnight but to not overuse and encourage use hand and wrist  - multi Podus boot on left foot patient can breaks to potentially decrease risk of contracture/skin breakdown   - patient has spastic left upper extremity making subluxation less likely although continue subluxation precautions      At risk for coping difficulty  Assessment & Plan  Supportive counseling  Psychology consult while in [facility]  Discharged on magnesium daily   Continue for now  Monitor level  Neck pain  Assessment & Plan  With taut muscle bands in left cervical paraspinal and trapezius areas  Gentle stretching  Baclofen 5 mg QID  Monitor closely given GFR  Bengay helpful  Heat not helpful  Manual therapies/modalities helpful   PRN APAP    Urinary retention  Assessment & Plan  Was using a purewick in hospital and initially on ARC  Was continent during the day  On 8/25 was noted not to have any urine output overnight by nursing    - Bladder scan was >500cc  Ultimately catheterized for 1600cc  - UA negative   - Starting Q6hr scan/cath program  Volumes appropriate on this, but still needing catheterization on occasion    - 8/29 showed some improvement with timed voids  - 8/31 had a bladder scan/cath ~700 after being given a lot of fluids  - 8/31 starting flomax      - 9/1 voided, PVR 84    - monitor for signs/symptoms of UTI  - ensure optimal bowel management  - bladder scan Q6H, monitor output and PVRs, straight cath >450 or if uncomfortable 250-449      CAD (coronary artery disease)  Assessment & Plan  IM consulted and with overall management at their discretion during ARC course  Antithrombotic: Pradaxa  Statin  Optimal blood pressure   Outpatient Cardiology follow-up    Per IM  · "Nonobstructive  · Card cath 8/2021 done for borderline abn stress test and found 50% mid RCA/40% mid LAD = no intervention done  · Continue statin  · Was not on ASA as an OP"       Valvular heart disease  Assessment & Plan  Per IM  · "Current ECHO:  LVEF 65%, mod AR/mild AS, mod TR with severely elevated RV systolic pressure, mild-mod MR, mod LAE/mild NEO  · Euvolemic currently but will watch since CTA on admission showed pulm edema and b/l pleural effusions"  -diuretics at their discretion   -Outpatient cardiology follow-up    Spastic hemiparesis of left dominant side as late effect of cerebral infarction [facility])  Assessment & Plan  Left upper extremity significantly (also some L neck tightness/spasms)  Modified Sada scale 3 in shoulder, and 2 in her elbow  - Has flexion synergy    - Has clonus in pronators and wrist flexors  Baclofen 5mg increased to QID  Monitor given GFR  May be more sleepy initially  Reached out to [employer] rep to see if we can get samples for inpatient administration of botulinum toxin   - 75 units lateral pec, 75 units biceps, 25 units FCR, 25 units pronator teres for total 200 units     - Have not been able to get Botox inpatient     Gentle range of motion with therapy  Patient does have movement of the hand and wrist and do want to encourage that - can still consider resting hand splint intermittently  Skilled PT OT  Optimal skin hygiene    DARRIUS (acute kidney injury) [facility])  Assessment & Plan  Resolved  8/29 Crea 1 04  Medicine consulted  Hold Lasix and Aldactone  Resume diuretics at discretion of medicine  Monitor labs and output    At risk for venous thromboembolism (VTE)  Assessment & Plan  SCDs, ambulation, and Pradaxa       Pacemaker  Assessment & Plan  Not MRI compatible    Chronic atrial fibrillation (HCC)  Assessment & Plan  IM consulted and with overall management at their discretion during ARC course  Rate control: Verapamil, Nebivolol   Antithrombotic: Pradaxa       Stage 3 chronic kidney disease [facility])  Assessment & Plan  Lab Results   Component Value Date    EGFR 50 09/01/2022    EGFR 46 08/29/2022    EGFR 49 08/25/2022    CREATININE 1 04 09/01/2022    CREATININE 1 10 08/29/2022    CREATININE 1 05 08/25/2022     Given IVF  Crea now back down to ~1 04 near baseline  Avoid nephrotoxic meds  Monitor voiding status  IM consulted  Hyperlipidemia LDL goal <100  Assessment & Plan  Statin    Essential hypertension  Assessment & Plan  Internal medicine consulted and co-management with their service  Monitor vitals with and without activity; monitor for orthostasis  Monitor hemoglobin, electrolytes, kidney function, hydration status   Current meds:  Losartan, nebivolol, Verapamil 240mg daily  - Lasix and Aldactone on hold      Other chest pain  Assessment & Plan  Resolved  8/25 with chest pain, ultimately felt to be 2/2 to spasticity  Improved with baclofen/robaxin  - delta hsTrop at 2 hr was -1    - EKG showed paced rhythm, no evidence of ischemia   - CXR unremarkable   - 30 min later had another episode that felt more like pressure  Rapid called, but not concerning for ACS  Generally reproducible   - Increased Baclofen and frequency of Sandra Footman  Has a cough with it, and lungs sound good, and CXR unremarkable  - May also be component of GERD  Started on pepcid  Also on throat spray  Fluticasone was irritating and didn't make a difference    - Had SLP eval swallow - which was fine  Health Maintenance  #Delirium/Sleep: At risk  Environmental interventions  Optimize pain, bowel, bladder, sleep-wake cycle management    #Pain: Tylenol PRN  #Bowel: Last BM 9/1  and continent  Not on regimen only PRN  Had several loose stools so stopped docusate  #Skin/Pressure Injury Prevention: Turn Q2hr in bed, with weight shifts [id number] in wheelchair  #DVT Prophylaxis: Fully anticoagulated on pradaxa  #GI Prophylaxis: PO diet   #Code Status: Full Code  #FEN: Reg/Thins  #Dispo: Team Conference 8/30: Patient adamant not to go to SNF, but will need assistance at discharge  Had caregivers at home, but unclear what services they can provide  Goals are sup-Tan function  Son is aware will provide us info on current caregivers to clarify availability and services    Plan on reteam  Received 8 more days  Objective:    Functional Update: This past week has made good progress  PT: mod-maxA transfers, Tan bed mobility, total A ambulation (mod-max with CF) 50', min-modA wheelchair mobility  OT: Sup eating, maxA grooming, Ax2 bathing, maxA UB dressing, Ax2 LB dressing, Ax2 toileting and toilet transfers  SLP: mild cognitive impairment  Does well in Georgian  Allergies per EMR    Physical Exam:  Temp:  [97 7 °F (36 5 °C)-98 5 °F (36 9 °C)] 97 7 °F (36 5 °C)  HR:  [62-65] 65  Resp:  [18-20] 20  BP: (127-129)/(60-66) 127/60  Oxygen Therapy  SpO2: 98 %    Gen: No acute distress, Well-nourished, well-appearing  HEENT: Moist mucus membranes, Normocephalic/Atraumatic  Cardiovascular: Regular rate, rhythm, S1/S2  Distal pulses palpable  Heme/Extr: No edema  Pulmonary: Non-labored breathing  Lungs CTAB  : No contreras  GI: Soft, non-tender, non-distended  Integumentary: Skin is warm, dry  Neuro: AAOx3, L spastic hemiparesis improving  Speech fluent and appropriate  Psych: Normal mood and affect  Diagnostic Studies: Reviewed, no new imaging      Laboratory:  Reviewed   Results from last 7 days   Lab Units 09/01/22  0654 08/29/22  0537   HEMOGLOBIN g/dL 11 7 11 3*   HEMATOCRIT % 36 8 36 3   WBC Thousand/uL 6 92 10 21*     Results from last 7 days   Lab Units 09/01/22  0654 08/29/22  0537   BUN mg/dL 28* 29*   POTASSIUM mmol/L 4 3 4 3   CHLORIDE mmol/L 106 105   CREATININE mg/dL 1 04 1 10            Patient Active Problem List   Diagnosis    Other chest pain    Essential hypertension    Hyperlipidemia LDL goal <100    Stage 3 chronic kidney disease ([facility] )    Osteoarthritis of both wrists    Chronic atrial fibrillation ([facility])    Tubulovillous adenoma    Gastroesophageal reflux disease without esophagitis    Pacemaker    B12 deficiency    Allergic rhinitis due to pollen    Cavus deformity of foot    Midline cystocele    Hallux abducto valgus, bilateral    Left renal artery stenosis ([facility])    Osteoarthritis of hip    Osteopenia    Pain due to onychomycosis of toenails of both feet    Left atrial enlargement    Lipoma of right upper extremity    Diverticulosis of colon    Abnormal nuclear stress test    Renal insufficiency    Arterial ischemic stroke, MCA (middle cerebral artery), right, acute ([facility])    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 ([facility])    Anemia    At risk for venous thromboembolism (VTE)    DARRIUS (acute kidney injury) ([facility])    Spastic hemiparesis of left dominant side as late effect of cerebral infarction ([facility] )    Valvular heart disease    CAD (coronary artery disease)    Urinary retention    Neck pain    Healthcare maintenance    At risk for coping difficulty         Medications  Current Facility-Administered Medications   Medication Dose Route Frequency Provider Last Rate    acetaminophen  650 mg Oral Q6H PRN Mónica Merino MD      baclofen  5 mg Oral 4x Daily Chris Manriquez MD      benzonatate  200 mg Oral TID TOMY Rojas      bisacodyl  10 mg Rectal Daily PRN Mónica Merino MD      dabigatran etexilate  150 mg Oral Q12H Harlan Hernandez MD      famotidine  20 mg Oral Daily Chris Manriquez MD      guaiFENesin  200 mg Oral Q6H PRN TOMY Leone      losartan  100 mg Oral Daily Lucia Donovan MD      magnesium oxide  400 mg Oral Daily Lucia Donovan MD      melatonin  3 mg Oral QPM Zuleima Abdullahi MD      menthol-methyl salicylate   Apply externally BID Zuleima Abdullahi MD      miconazole   Topical BID PRN Shantell BumpTOMY bundy      nebivolol  5 mg Oral Daily Lucia Donovan MD      nitroglycerin  0 4 mg Sublingual Q5 Min PRN Waunita Medici TOMY Piper      phenol  1 spray Mouth/Throat Q2H PRN Zuleima Abdullahi MD      polyethylene glycol  17 g Oral Daily PRN Lucia Donovan MD      pravastatin  40 mg Oral Daily With Ruffus Aschoff, MD      tamsulosin  0 4 mg Oral Daily With Roney Jules MD      verapamil  240 mg Oral HS Lucia Donovan MD            ** Please Note: Fluency Direct voice to text software may have been used in the creation of this document   **

## 2022-09-01 NOTE — PROGRESS NOTES
09/01/22 1450   Pain Assessment   Pain Assessment Tool 0-10   Pain Score No Pain   Restrictions/Precautions   Precautions Bed/chair alarms;Cognitive; Fall Risk;Pain;Supervision on toilet/commode   Weight Bearing Restrictions No   ROM Restrictions No   Braces or Orthoses AFO  (with therapy, multipodus LLE)   Cognition   Overall Cognitive Status Impaired   Arousal/Participation Alert; Cooperative   Attention Attends with cues to redirect   Orientation Level Oriented X4   Memory Decreased short term memory;Decreased recall of recent events   Following Commands Follows one step commands with increased time or repetition   Sit to Lying   Type of Assistance Needed Supervision; Adaptive equipment   Comment with increased time and R bed rail   Sit to Lying CARE Score 4   Car Transfer   Comment (S)  trial simulated car when appropriate   Ambulation   Does the patient walk? 2  Yes   Primary Mode of Locomotion Prior to Admission Walk   Wheelchair mobility   Does the patient use a wheelchair? 1  Yes   Type of Wheelchair Used 1  Manual   Therapeutic Interventions   Strengthening went over HEP including seated LAQ, hip flex, supine heel slides with AAROM to LLE, LAQ with AAROM to LLE and hip ABD in hooklying with AAROM to LLE 2 x10 reps BLE  performed LLE knee flex/ext with resistance in ext 2 x10 reps   Flexibility L HS/HC stretches in supine  Assessment   Treatment Assessment Pt participated in skilled PT session with increased focus on LE strengthening and increased ROM to LLE  Pt cont to have increased tone to LLE decreasing current ROM and use  Pt did show improved bed mobility this session as she required CS with sit to supine transfers  A was still required for bumping up to head of bed as she is unable to lean forward enough to lift bottom  Pt will cont to benefit from increased functional tranfers, increased gait and improved safety awareness to decrease burden of care  Cont POC as tolerated     Problem List Decreased strength;Decreased range of motion;Decreased endurance; Impaired balance;Decreased coordination;Decreased mobility; Decreased cognition; Impaired judgement;Decreased safety awareness; Impaired tone   Barriers to Discharge Inaccessible home environment;Decreased caregiver support   PT Barriers   Functional Limitation Car transfers;Stair negotiation;Standing;Transfers; Walking; Wheelchair management   Plan   Treatment/Interventions Functional transfer training;LE strengthening/ROM; Therapeutic exercise; Endurance training;Cognitive reorientation;Patient/family training;Bed mobility;Gait training   Progress Progressing toward goals   Recommendation   PT Discharge Recommendation Post acute rehabilitation services   Equipment Recommended Wheelchair   PT Therapy Minutes   PT Time In 1450   PT Time Out 1530   PT Total Time (minutes) 40   PT Mode of treatment - Individual (minutes) 40   PT Mode of treatment - Concurrent (minutes) 0   PT Mode of treatment - Group (minutes) 0   PT Mode of treatment - Co-treat (minutes) 0   PT Mode of Treatment - Total time(minutes) 40 minutes   PT Cumulative Minutes 1105   Therapy Time missed   Time missed?  No

## 2022-09-01 NOTE — PLAN OF CARE
Reviewed       Problem: MOBILITY - ADULT  Goal: Maintain or return to baseline ADL function  Description: INTERVENTIONS:  -  Assess patient's ability to carry out ADLs; assess patient's baseline for ADL function and identify physical deficits which impact ability to perform ADLs (bathing, care of mouth/teeth, toileting, grooming, dressing, etc )  - Assess/evaluate cause of self-care deficits   - Assess range of motion  - Assess patient's mobility; develop plan if impaired  - Assess patient's need for assistive devices and provide as appropriate  - Encourage maximum independence but intervene and supervise when necessary  - Involve family in performance of ADLs  - Assess for home care needs following discharge   - Consider OT consult to assist with ADL evaluation and planning for discharge  - Provide patient education as appropriate  Outcome: Progressing  Goal: Maintains/Returns to pre admission functional level  Description: INTERVENTIONS:  - Set and communicate daily mobility goal to care team and patient/family/caregiver  - Collaborate with rehabilitation services on mobility goals if consulted  - Perform Range of Motion 3 times a day  - Reposition patient every 2 hours    - Dangle patient 3 times a day  - Stand patient 3 times a day  - Ambulate patient 3 times a day  - Out of bed to chair 3 times a day   - Out of bed for meals 3 times a day  - Out of bed for toileting  - Record patient progress and toleration of activity level   Outcome: Progressing     Problem: Prexisting or High Potential for Compromised Skin Integrity  Goal: Skin integrity is maintained or improved  Description: INTERVENTIONS:  - Identify patients at risk for skin breakdown  - Assess and monitor skin integrity  - Assess and monitor nutrition and hydration status  - Monitor labs   - Assess for incontinence   - Turn and reposition patient  - Assist with mobility/ambulation  - Relieve pressure over bony prominences  - Avoid friction and shearing  - Provide appropriate hygiene as needed including keeping skin clean and dry  - Evaluate need for skin moisturizer/barrier cream  - Collaborate with interdisciplinary team   - Patient/family teaching  - Consider wound care consult   Outcome: Progressing     Problem: Potential for Falls  Goal: Patient will remain free of falls  Description: INTERVENTIONS:  - Educate patient/family on patient safety including physical limitations  - Instruct patient to call for assistance with activity   - Consult OT/PT to assist with strengthening/mobility   - Keep Call bell within reach  - Keep bed low and locked with side rails adjusted as appropriate  - Keep care items and personal belongings within reach  - Initiate and maintain comfort rounds  - Make Fall Risk Sign visible to staff  - Offer Toileting every 2-4 Hours, in advance of need  - Initiate/Maintain bed/chair alarm  - Obtain necessary fall risk management equipment: nonskid footwear  - Apply yellow socks and bracelet for high fall risk patients  - Consider moving patient to room near nurses station  Outcome: Progressing     Problem: PAIN - ADULT  Goal: Verbalizes/displays adequate comfort level or baseline comfort level  Description: Interventions:  - Encourage patient to monitor pain and request assistance  - Assess pain using appropriate pain scale  - Administer analgesics based on type and severity of pain and evaluate response  - Implement non-pharmacological measures as appropriate and evaluate response  - Consider cultural and social influences on pain and pain management  - Notify physician/advanced practitioner if interventions unsuccessful or patient reports new pain  Outcome: Progressing     Problem: INFECTION - ADULT  Goal: Absence or prevention of progression during hospitalization  Description: INTERVENTIONS:  - Assess and monitor for signs and symptoms of infection  - Monitor lab/diagnostic results  - Monitor all insertion sites, i e  indwelling lines, tubes, and drains  - Monitor endotracheal if appropriate and nasal secretions for changes in amount and color  - Kelliher appropriate cooling/warming therapies per order  - Administer medications as ordered  - Instruct and encourage patient and family to use good hand hygiene technique  - Identify and instruct in appropriate isolation precautions for identified infection/condition  Outcome: Progressing     Problem: DISCHARGE PLANNING  Goal: Discharge to home or other facility with appropriate resources  Description: INTERVENTIONS:  - Identify barriers to discharge w/patient and caregiver  - Arrange for needed discharge resources and transportation as appropriate  - Identify discharge learning needs (meds, wound care, etc )  - Arrange for interpretive services to assist at discharge as needed  - Refer to Case Management Department for coordinating discharge planning if the patient needs post-hospital services based on physician/advanced practitioner order or complex needs related to functional status, cognitive ability, or social support system  Outcome: Progressing     Problem: Nutrition/Hydration-ADULT  Goal: Nutrient/Hydration intake appropriate for improving, restoring or maintaining nutritional needs  Description: Monitor and assess patient's nutrition/hydration status for malnutrition  Collaborate with interdisciplinary team and initiate plan and interventions as ordered  Monitor patient's weight and dietary intake as ordered or per policy  Utilize nutrition screening tool and intervene as necessary  Determine patient's food preferences and provide high-protein, high-caloric foods as appropriate       INTERVENTIONS:  - Monitor oral intake, urinary output, labs, and treatment plans  - Assess nutrition and hydration status and recommend course of action  - Evaluate amount of meals eaten  - Assist patient with eating if necessary   - Allow adequate time for meals  - Recommend/ encourage appropriate diets, oral nutritional supplements, and vitamin/mineral supplements  - Order, calculate, and assess calorie counts as needed  - Recommend, monitor, and adjust tube feedings and TPN/PPN based on assessed needs  - Assess need for intravenous fluids  - Provide specific nutrition/hydration education as appropriate  - Include patient/family/caregiver in decisions related to nutrition  Outcome: Progressing

## 2022-09-01 NOTE — PROGRESS NOTES
Internal Medicine Progress Note  Patient: Satish Lima  Age/sex: 80 y o  female  Medical Record #: [id number]      ASSESSMENT/PLAN: (Interval History)  Satish Lima is seen and examined and management for following issues:    Acute embolic right MCA CVA  · Had near occlusion of right MCA  · Felt to be a Xarelto failure and was switched to Pradaxa  · ECHO w/o thrombus  · Continue statin  · On Baclofen for tone left arm = much improved     Chronic atrial fibrillation  · Sees Dr Luis Sos as OP  · Rates controlled  · On Pradaxa now, Xarelto stopped     PPM  · VVI  · Is not MRI conditional  · 100% paced on EKG     Valvular disease  · Current ECHO:  LVEF 65%, mod AR/mild AS, mod TR with severely elevated RV systolic pressure, mild-mod MR, mod LAE/mild NEO  · Euvolemic currently     HTN  · Home:  Verapamil 240mg qhs/Cozaar 706 mg qd/Bystolic 5mg qd/lasix 37IT qd/Aldactone 25mg qd  · Here:  Verapamil 240mg qhs/Cozaar 066AD qd/Bystolic 5mg qd  · OP note states has left RA stenosis but no testing in OP records or Care Everywhere to verify  · No changes again today     HLD  · Home:  Crestor 10mg qd = Neuro rec when discharged to reduce to 5mg qd  · Here:  Pravachol 40mg qd     DARRIUS  · S/p NS 1 liter   · Back to baseline  · Continue to hold Lasix and Aldactone for now     CAD  · Nonobstructive  · Card cath 8/2021 done for borderline abn stress test and found 50% mid RCA/40% mid LAD = no intervention done  · Continue statin  · Was not on ASA as an OP     Hypomagnesemia  · Takes here and at home 400 mg MagOx  · Mg level 9/1 was 2 1     Left chest pain  · Had left neck pain (not new for her) then pain under left breast = reproducible with palpation  · EKG was 100% v-paced but no obvious acute changes; trops negative Bengay applied  · At the time, felt likely M/S from spasm  She then had worsening pain in chest and described pressure so RR called    · Trops = 29/-, 28/-1, 28/-1  · Etio was her left arm spasticity   ·  Depadua inc Baclofen to 5mg QID on 8/29   Has 5mg qhs prn as well       Cough  · Have not witnessed her coughing but she endorses  · Has some clear-whitish secretions  · CXR negative 8/25  · Dr Ginger Truong added Pepcid in case etio of cough is 2/2 GERD  · Has prn Robitussin   · Was on Flonase = stopped since not effective  · Inc tessalon perles to 200mg TID 8/28  · Has been on the Cozaar for quite a while she says  · Appears to be coughing on saliva per ST but she did well otherwise with their eval and they did not change her diet     Urine retention  · Getting str cath'd  · Management per PMR  · Flomax added on 8/31/22     Vaginal yeast infection  · Is receiving Monistat pack and gave 1 dose of Diflucan 150mg         DC planning: Reteam       The above assessment and plan was reviewed and updated as determined by my evaluation of the patient on 9/1/2022      Labs:   Results from last 7 days   Lab Units 09/01/22  0654 08/29/22  0537   WBC Thousand/uL 6 92 10 21*   HEMOGLOBIN g/dL 11 7 11 3*   HEMATOCRIT % 36 8 36 3   PLATELETS Thousands/uL 263 317     Results from last 7 days   Lab Units 09/01/22  0654 08/29/22  0537   SODIUM mmol/L 136 135   POTASSIUM mmol/L 4 3 4 3   CHLORIDE mmol/L 106 105   CO2 mmol/L 26 26   BUN mg/dL 28* 29*   CREATININE mg/dL 1 04 1 10   CALCIUM mg/dL 9 1 9 3                   Review of Scheduled Meds:  Current Facility-Administered Medications   Medication Dose Route Frequency Provider Last Rate    acetaminophen  650 mg Oral Q6H PRN Lucia Donovan MD      baclofen  5 mg Oral 4x Daily Zuleima Abdullahi MD      benzonatate  200 mg Oral TID Shantell Bumps, CRNP      bisacodyl  10 mg Rectal Daily PRN Lucia Donovan MD      dabigatran etexilate  150 mg Oral Q12H [address] Lucia Donovan MD      famotidine  20 mg Oral Daily Zuleima Abdullahi MD      guaiFENesin  200 mg Oral Q6H PRN Shantell Bumps, CRNP      losartan  100 mg Oral Daily Lucia Donovan MD      magnesium oxide  400 mg Oral Daily Radha Mendoza MD      melatonin  3 mg Oral QPM Vinay Lake MD      menthol-methyl salicylate   Apply externally BID Vinay Lake MD      miconazole   Topical BID PRN TOMY Zavala      nebivolol  5 mg Oral Daily Radha Mendoza MD      nitroglycerin  0 4 mg Sublingual Q5 Min PRN TOMY Del Toro      phenol  1 spray Mouth/Throat Q2H PRN Vinay Lake MD      polyethylene glycol  17 g Oral Daily PRN Radha Mendoza MD      pravastatin  40 mg Oral Daily With Charlotte Dejesus MD      tamsulosin  0 4 mg Oral Daily With Roxy Florian MD      verapamil  240 mg Oral HS Radha Mendoza MD         Subjective/ HPI: Patient seen and examined  Patients overnight issues or events were reviewed with nursing or staff during rounds or morning huddle session  New or overnight issues include the following:     No new or overnight issues  Offers no complaints    ROS:   A 10 point ROS was performed; negative except as noted above       *Labs /Radiology studies reviewed  *Medications reviewed and reconciled as needed  *Please refer to order section for additional ordered labs studies  *Case discussed with primary attending during morning huddle case rounds    Physical Examination:  Vitals:   Vitals:    08/31/22 0815 08/31/22 1337 08/31/22 2035 09/01/22 0551   BP: 133/60 128/62 129/66 127/60   BP Location: Left arm Right arm Right arm Right arm   Pulse: 60 62 62 65   Resp:  18 18 20   Temp:  97 9 °F (36 6 °C) 98 5 °F (36 9 °C) 97 7 °F (36 5 °C)   TempSrc:  Oral Oral Oral   SpO2:  97% 100% 98%   Weight:       Height:           General Appearance: no distress, conversive  HEENT: PERRLA, conjuctiva normal; oropharynx clear; mucous membranes moist   Neck:  Supple, normal ROM  Lungs: CTA, normal respiratory effort, no retractions, expiratory effort normal  CV: regular rate and rhythm; no rubs/murmurs/gallops, PMI normal   ABD: soft; ND/NT; +BS  EXT: no edema  Skin: normal turgor, normal texture, no rashes  Psych: affect normal, mood normal  Neuro: AAO      The above physical exam was reviewed and updated as determined by my evaluation of the patient on 9/1/2022  Invasive Devices  Report    None                    VTE Pharmacologic Prophylaxis: Pradaxa  Code Status: Level 1 - Full Code  Current Length of Stay: 13 day(s)      Total time spent:  30 minutes with more than 50% spent counseling/coordinating care  Counseling includes discussion with patient re: progress  and discussion with patient of his/her current medical state/information  Coordination of patient's care was performed in conjunction with primary service  Time invested included review of patient's labs, vitals, and management of their comorbidities with continued monitoring  In addition, this patient was discussed with medical team including physician and advanced extenders  The care of the patient was extensively discussed and appropriate treatment plan was formulated unique for this patient  ** Please Note:  voice to text software may have been used in the creation of this document   Although proof errors in transcription or interpretation are a potential of such software**

## 2022-09-01 NOTE — PROGRESS NOTES
09/01/22 0900   Pain Assessment   Pain Assessment Tool 0-10   Pain Score No Pain   Restrictions/Precautions   Precautions Bed/chair alarms;Cognitive; Fall Risk;Supervision on toilet/commode   Comprehension   Comprehension (FIM) 5 - Understands basic directions and conversation   Expression   Expression (FIM) 5 - Needs help/cues only RARELY (< 10% of the time)   Social Interaction   Social Interaction (FIM) 6 - Interacts appropriately with others BUT requires extra  time   Problem Solving   Problem solving (FIM) 4 - Solves basic problems 75-89% of time   Memory   Memory (FIM) 4 - Recognizes/recalls/performs 75-89%   Speech/Language/Cognition Assessmetn   Treatment Assessment Pt was just finishing PT session in which pt was able to report to SLP that she did 'a lot of walking ' Pt additionally providing SLP that the L leg is like 'jello' and is difficult to move when walking but was happy w/ today's PT session  Focus of ST session was towards functional money management as well as review given current medications  SLP using tangible coins and dollars for pt to retrieve the correct change/dollar amounts as pt determined  When only using coin amounts, pt was 4/5 accurate, increasing to 5/5 given minimal cues to 'recount' which pt was able to correct error  Additionally, given increased time, pt was able to determine higher coin amounts w/o needing cues  When switching to dollar amounts, pt was 5/5 accurate in determining the correct bills to provide SLP, but again noting increased time given larger dollar amounts to ensure pt had correct amount  Pt did verbalize using credit or debit card more so recently but will occasionally use cash when at grocery store/restaurants, etc  Remainder of session did focus on verbal review given current medications to where pt was to verbalize if medication is new since admission   SLP showing pt the name of the medication to where pt was 12/12 accurate in ability to ID whether the medication was new vs home  Pt was noted to have good recall/knowledge given reason for home medications (5 medications) and was able to recognize the reason for taking 3 out of the 7 new medications which pt is now taking during this admission  Suspect w/ ongoing review given this information as well as providing written handout given the medications and reason for taking medications, pt will continue to improve overall recall of new medications  Additionally pt will likely benefit from trialing tangible medication management w/ pill box in future sessions to improve carryover/recall in taking medications  At this time pt will continue to benefit from ongoing skilled SLP services to maximize overall functional cognitive linguistic skills to decrease caregiver support over time  SLP Therapy Minutes   SLP Time In 0900   SLP Time Out 0930   SLP Total Time (minutes) 30   SLP Mode of treatment - Individual (minutes) 30   SLP Mode of treatment - Concurrent (minutes) 0   SLP Mode of treatment - Group (minutes) 0   SLP Mode of treatment - Co-treat (minutes) 0   SLP Mode of Treatment - Total time(minutes) 30 minutes   SLP Cumulative Minutes 330   Therapy Time missed   Time missed?  No

## 2022-09-01 NOTE — PROGRESS NOTES
09/01/22 1350   Pain Assessment   Pain Assessment Tool 0-10   Pain Score No Pain   Toileting Hygiene   Type of Assistance Needed Physical assistance   Physical Assistance Level Total assistance   Comment completing toeilting with nursing staff for transfer training  AX2 completed  completed safely with increased time for management of L Side  Toileting Hygiene CARE Score 1   Toilet Transfer   Type of Assistance Needed Physical assistance   Physical Assistance Level Total assistance   Comment AX2 SPT w/ grab bars  Toilet Transfer CARE Score 1   ROM- Right Upper Extremities   RUE ROM Comment Fugyl wills UE assessment completed see below for details  supine/sidelying scapular mobs with STM 10 min on upper trap and SCM  Activity Tolerance   Activity Tolerance Patient tolerated treatment well   Assessment   Treatment Assessment Pt participated in skilled OT treatment session with treatment focus on transfer training with RN staff, Giuliano Andrew UE assessment  Pt overall still making improvement in overall functional abilities with L UE  Pt improving with distal control  Still severely limited by spasticity in shoulder unit, kerry pec and scapula region  Pt's left UE function currently Functional assist  Prehension patterns noted (lateral Pinch)  Pt is able to complete 9 hole peg test if given ample time  increased isolation of digits noted   Continue to focus neurological treatment plan:Repetitive Task training, Trunk restrained Reaching, Weight bearing, Mirror Therapy, Body awareness, Gross motor coordination, motor planning, Spatial awareness, Functional Attention , Constraint Induced Movement Therapy (modified) and KinesioTaping   OT Therapy Minutes   OT Time In 1350   OT Time Out 1450   OT Total Time (minutes) 60   OT Mode of treatment - Individual (minutes) 60   OT Mode of treatment - Concurrent (minutes) 0   OT Mode of treatment - Group (minutes) 0   OT Mode of treatment - Co-treat (minutes) 0   OT Mode of Treatment - Total time(minutes) 60 minutes   OT Cumulative Minutes 1000     Fugl-Centeno UE Assessment  Left  L UE dominant    A: UPPER EXTREMITY    I: reflex activity 4/4   II:volitional movement within synergies 10/18   III: volitional movement mixing synergies 5/6   IV: volitional movement with little or no synergy 3/6   V: Normal Reflex activity 0/2   B: WRIST 9/10   C: HAND 14/14   D: Coordination and Speed   R: 8 72 L:11 02 seconds 4/6   H: Sensation 12/12   J: PROM 17/24   J: Joint Pain 24/24   Score from motor sections A-D:     45  /66

## 2022-09-02 LAB
ANION GAP SERPL CALCULATED.3IONS-SCNC: 6 MMOL/L (ref 4–13)
ATRIAL RATE: 61 BPM
BACTERIA UR QL AUTO: ABNORMAL /HPF
BASOPHILS # BLD AUTO: 0.02 THOUSANDS/ΜL (ref 0–0.1)
BASOPHILS NFR BLD AUTO: 0 % (ref 0–1)
BILIRUB UR QL STRIP: NEGATIVE
BUN SERPL-MCNC: 26 MG/DL (ref 5–25)
CALCIUM SERPL-MCNC: 9.2 MG/DL (ref 8.3–10.1)
CHLORIDE SERPL-SCNC: 106 MMOL/L (ref 96–108)
CLARITY UR: ABNORMAL
CO2 SERPL-SCNC: 22 MMOL/L (ref 21–32)
COLOR UR: ABNORMAL
CREAT SERPL-MCNC: 1.14 MG/DL (ref 0.6–1.3)
EOSINOPHIL # BLD AUTO: 0.06 THOUSAND/ΜL (ref 0–0.61)
EOSINOPHIL NFR BLD AUTO: 1 % (ref 0–6)
ERYTHROCYTE [DISTWIDTH] IN BLOOD BY AUTOMATED COUNT: 14.1 % (ref 11.6–15.1)
GFR SERPL CREATININE-BSD FRML MDRD: 44 ML/MIN/1.73SQ M
GLUCOSE SERPL-MCNC: 135 MG/DL (ref 65–140)
GLUCOSE UR STRIP-MCNC: NEGATIVE MG/DL
HCT VFR BLD AUTO: 34.6 % (ref 34.8–46.1)
HGB BLD-MCNC: 11.3 G/DL (ref 11.5–15.4)
HGB UR QL STRIP.AUTO: NEGATIVE
IMM GRANULOCYTES # BLD AUTO: 0.03 THOUSAND/UL (ref 0–0.2)
IMM GRANULOCYTES NFR BLD AUTO: 0 % (ref 0–2)
KETONES UR STRIP-MCNC: NEGATIVE MG/DL
LEUKOCYTE ESTERASE UR QL STRIP: ABNORMAL
LYMPHOCYTES # BLD AUTO: 0.93 THOUSANDS/ΜL (ref 0.6–4.47)
LYMPHOCYTES NFR BLD AUTO: 11 % (ref 14–44)
MCH RBC QN AUTO: 30.3 PG (ref 26.8–34.3)
MCHC RBC AUTO-ENTMCNC: 32.7 G/DL (ref 31.4–37.4)
MCV RBC AUTO: 93 FL (ref 82–98)
MONOCYTES # BLD AUTO: 0.7 THOUSAND/ΜL (ref 0.17–1.22)
MONOCYTES NFR BLD AUTO: 9 % (ref 4–12)
NEUTROPHILS # BLD AUTO: 6.45 THOUSANDS/ΜL (ref 1.85–7.62)
NEUTS SEG NFR BLD AUTO: 79 % (ref 43–75)
NITRITE UR QL STRIP: NEGATIVE
NON-SQ EPI CELLS URNS QL MICRO: ABNORMAL /HPF
NRBC BLD AUTO-RTO: 0 /100 WBCS
PH UR STRIP.AUTO: 7.5 [PH]
PLATELET # BLD AUTO: 269 THOUSANDS/UL (ref 149–390)
PMV BLD AUTO: 10.9 FL (ref 8.9–12.7)
POTASSIUM SERPL-SCNC: 4 MMOL/L (ref 3.5–5.3)
PROT UR STRIP-MCNC: NEGATIVE MG/DL
QRS AXIS: -61 DEGREES
QRSD INTERVAL: 176 MS
QT INTERVAL: 496 MS
QTC INTERVAL: 496 MS
RBC # BLD AUTO: 3.73 MILLION/UL (ref 3.81–5.12)
RBC #/AREA URNS AUTO: ABNORMAL /HPF
SODIUM SERPL-SCNC: 134 MMOL/L (ref 135–147)
SP GR UR STRIP.AUTO: 1.01 (ref 1–1.03)
T WAVE AXIS: 86 DEGREES
UROBILINOGEN UR STRIP-ACNC: <2 MG/DL
VENTRICULAR RATE: 60 BPM
WBC # BLD AUTO: 8.19 THOUSAND/UL (ref 4.31–10.16)
WBC #/AREA URNS AUTO: ABNORMAL /HPF
WBC CLUMPS # UR AUTO: PRESENT /UL

## 2022-09-02 PROCEDURE — 97110 THERAPEUTIC EXERCISES: CPT

## 2022-09-02 PROCEDURE — 97530 THERAPEUTIC ACTIVITIES: CPT

## 2022-09-02 PROCEDURE — 80048 BASIC METABOLIC PNL TOTAL CA: CPT | Performed by: NURSE PRACTITIONER

## 2022-09-02 PROCEDURE — 93010 ELECTROCARDIOGRAM REPORT: CPT | Performed by: INTERNAL MEDICINE

## 2022-09-02 PROCEDURE — 81001 URINALYSIS AUTO W/SCOPE: CPT | Performed by: NURSE PRACTITIONER

## 2022-09-02 PROCEDURE — 97535 SELF CARE MNGMENT TRAINING: CPT

## 2022-09-02 PROCEDURE — 85025 COMPLETE CBC W/AUTO DIFF WBC: CPT | Performed by: NURSE PRACTITIONER

## 2022-09-02 PROCEDURE — 99232 SBSQ HOSP IP/OBS MODERATE 35: CPT | Performed by: INTERNAL MEDICINE

## 2022-09-02 PROCEDURE — 99233 SBSQ HOSP IP/OBS HIGH 50: CPT

## 2022-09-02 PROCEDURE — 97112 NEUROMUSCULAR REEDUCATION: CPT

## 2022-09-02 RX ORDER — GUAIFENESIN 600 MG/1
600 TABLET, EXTENDED RELEASE ORAL EVERY 12 HOURS SCHEDULED
Status: DISCONTINUED | OUTPATIENT
Start: 2022-09-02 | End: 2022-09-13 | Stop reason: HOSPADM

## 2022-09-02 RX ORDER — AZITHROMYCIN 250 MG/1
250 TABLET, FILM COATED ORAL EVERY 24 HOURS
Status: DISCONTINUED | OUTPATIENT
Start: 2022-09-03 | End: 2022-09-05

## 2022-09-02 RX ORDER — AZITHROMYCIN 500 MG/1
500 TABLET, FILM COATED ORAL EVERY 24 HOURS
Status: COMPLETED | OUTPATIENT
Start: 2022-09-02 | End: 2022-09-02

## 2022-09-02 RX ADMIN — BENZONATATE 200 MG: 100 CAPSULE ORAL at 08:33

## 2022-09-02 RX ADMIN — AZITHROMYCIN 500 MG: 500 TABLET, FILM COATED ORAL at 18:03

## 2022-09-02 RX ADMIN — TAMSULOSIN HYDROCHLORIDE 0.4 MG: 0.4 CAPSULE ORAL at 16:07

## 2022-09-02 RX ADMIN — MENTHOL, METHYL SALICYLATE: 10; 15 CREAM TOPICAL at 08:33

## 2022-09-02 RX ADMIN — DABIGATRAN ETEXILATE MESYLATE 150 MG: 150 CAPSULE ORAL at 21:12

## 2022-09-02 RX ADMIN — PRAVASTATIN SODIUM 40 MG: 40 TABLET ORAL at 16:07

## 2022-09-02 RX ADMIN — NEBIVOLOL 5 MG: 5 TABLET ORAL at 08:33

## 2022-09-02 RX ADMIN — BACLOFEN 5 MG: 10 TABLET ORAL at 08:33

## 2022-09-02 RX ADMIN — VERAPAMIL HYDROCHLORIDE 240 MG: 240 TABLET ORAL at 21:12

## 2022-09-02 RX ADMIN — BENZONATATE 200 MG: 100 CAPSULE ORAL at 16:07

## 2022-09-02 RX ADMIN — FAMOTIDINE 20 MG: 20 TABLET, FILM COATED ORAL at 08:33

## 2022-09-02 RX ADMIN — LOSARTAN POTASSIUM 100 MG: 50 TABLET, FILM COATED ORAL at 08:33

## 2022-09-02 RX ADMIN — BACLOFEN 5 MG: 10 TABLET ORAL at 17:25

## 2022-09-02 RX ADMIN — BENZONATATE 200 MG: 100 CAPSULE ORAL at 21:13

## 2022-09-02 RX ADMIN — BACLOFEN 5 MG: 10 TABLET ORAL at 21:13

## 2022-09-02 RX ADMIN — MAGNESIUM OXIDE TAB 400 MG (241.3 MG ELEMENTAL MG) 400 MG: 400 (241.3 MG) TAB at 08:33

## 2022-09-02 RX ADMIN — Medication 3 MG: at 21:13

## 2022-09-02 RX ADMIN — BACLOFEN 5 MG: 10 TABLET ORAL at 12:33

## 2022-09-02 RX ADMIN — MENTHOL, METHYL SALICYLATE: 10; 15 CREAM TOPICAL at 17:26

## 2022-09-02 RX ADMIN — GUAIFENESIN 600 MG: 600 TABLET, EXTENDED RELEASE ORAL at 21:13

## 2022-09-02 RX ADMIN — DABIGATRAN ETEXILATE MESYLATE 150 MG: 150 CAPSULE ORAL at 08:33

## 2022-09-02 RX ADMIN — ACETAMINOPHEN 650 MG: 325 TABLET ORAL at 05:46

## 2022-09-02 NOTE — CASE MANAGEMENT
Cm met w/pts son and reviewed insurance update due 9/7  [facility] stated pt will not have 24 hr assist on dc and he forgot to bring info for cm to contact the a agency  mariposa feels it is best for pt to go to a subacute setting on dc  Cm inquired if pt is able to stay with anyone on dc and that is not a possibility  Cm offered to provide an in network list to mariposa for them to review options  When cm returned he was no longer visiting  Cm phoned mariposa who confirmed he forgot cm was returning to the room  Cm reviewed options on the phone with mariposa and if they are not in agreement with [facility] facilities they are left with [facility] and [facility]  mariposa understands a referral will be made to all 3 facilities and he can think about which one would be his first choice

## 2022-09-02 NOTE — QUICK NOTE
Called to see patient because she was repeating a word that she didn't know the meaning of  When asked why she was saying it she said she didn't know why she was saying it  When I saw her, she answered all questions correctly and was fluently conversing with the nurse in [facility] and me in [place]  Her  strength and spacticity was baseline left arm unchanged  Able to straighten arm out and actually fed herself with it today  Left hip flexion 3/5  She was awake and alert; speech clear and face symmetric  I did then go back and see her with Dr Sana Rice in the gym about 10-15 min later  She then said she wanted to say Chase Soriano but that the nonsensical word kept coming out  Currently, she is stable and unchanged  Says the nurses name Chase Soriano w/o trouble  Still says coughing but seems to always want to spit out any saliva in her mouth  Is trace blood tinged at times  Will add Mucinex 600mg q12hrs since she complains secretions are thick and stop prn Robitussin    Will send UA, CBC and BMP

## 2022-09-02 NOTE — PLAN OF CARE
Problem: MOBILITY - ADULT  Goal: Maintain or return to baseline ADL function  Description: INTERVENTIONS:  -  Assess patient's ability to carry out ADLs; assess patient's baseline for ADL function and identify physical deficits which impact ability to perform ADLs (bathing, care of mouth/teeth, toileting, grooming, dressing, etc )  - Assess/evaluate cause of self-care deficits   - Assess range of motion  - Assess patient's mobility; develop plan if impaired  - Assess patient's need for assistive devices and provide as appropriate  - Encourage maximum independence but intervene and supervise when necessary  - Involve family in performance of ADLs  - Assess for home care needs following discharge   - Consider OT consult to assist with ADL evaluation and planning for discharge  - Provide patient education as appropriate  Outcome: Progressing  Goal: Maintains/Returns to pre admission functional level  Description: INTERVENTIONS:  - Set and communicate daily mobility goal to care team and patient/family/caregiver  - Collaborate with rehabilitation services on mobility goals if consulted  - Perform Range of Motion 3 times a day  - Reposition patient every 2 hours    - Dangle patient 3 times a day  - Stand patient 3 times a day  - Ambulate patient 3 times a day  - Out of bed to chair 3 times a day   - Out of bed for meals 3 times a day  - Out of bed for toileting  - Record patient progress and toleration of activity level   Outcome: Progressing     Problem: Prexisting or High Potential for Compromised Skin Integrity  Goal: Skin integrity is maintained or improved  Description: INTERVENTIONS:  - Identify patients at risk for skin breakdown  - Assess and monitor skin integrity  - Assess and monitor nutrition and hydration status  - Monitor labs   - Assess for incontinence   - Turn and reposition patient  - Assist with mobility/ambulation  - Relieve pressure over bony prominences  - Avoid friction and shearing  - Provide appropriate hygiene as needed including keeping skin clean and dry  - Evaluate need for skin moisturizer/barrier cream  - Collaborate with interdisciplinary team   - Patient/family teaching  - Consider wound care consult   Outcome: Progressing     Problem: Potential for Falls  Goal: Patient will remain free of falls  Description: INTERVENTIONS:  - Educate patient/family on patient safety including physical limitations  - Instruct patient to call for assistance with activity   - Consult OT/PT to assist with strengthening/mobility   - Keep Call bell within reach  - Keep bed low and locked with side rails adjusted as appropriate  - Keep care items and personal belongings within reach  - Initiate and maintain comfort rounds  - Make Fall Risk Sign visible to staff  - Offer Toileting every 2-4 Hours, in advance of need  - Initiate/Maintain bed/chair alarm  - Obtain necessary fall risk management equipment: nonskid footwear  - Apply yellow socks and bracelet for high fall risk patients  - Consider moving patient to room near nurses station  Outcome: Progressing     Problem: PAIN - ADULT  Goal: Verbalizes/displays adequate comfort level or baseline comfort level  Description: Interventions:  - Encourage patient to monitor pain and request assistance  - Assess pain using appropriate pain scale  - Administer analgesics based on type and severity of pain and evaluate response  - Implement non-pharmacological measures as appropriate and evaluate response  - Consider cultural and social influences on pain and pain management  - Notify physician/advanced practitioner if interventions unsuccessful or patient reports new pain  Outcome: Progressing     Problem: INFECTION - ADULT  Goal: Absence or prevention of progression during hospitalization  Description: INTERVENTIONS:  - Assess and monitor for signs and symptoms of infection  - Monitor lab/diagnostic results  - Monitor all insertion sites, i e  indwelling lines, tubes, and drains  - Monitor endotracheal if appropriate and nasal secretions for changes in amount and color  - [place] appropriate cooling/warming therapies per order  - Administer medications as ordered  - Instruct and encourage patient and family to use good hand hygiene technique  - Identify and instruct in appropriate isolation precautions for identified infection/condition  Outcome: Progressing     Problem: DISCHARGE PLANNING  Goal: Discharge to home or other facility with appropriate resources  Description: INTERVENTIONS:  - Identify barriers to discharge w/patient and caregiver  - Arrange for needed discharge resources and transportation as appropriate  - Identify discharge learning needs (meds, wound care, etc )  - Arrange for interpretive services to assist at discharge as needed  - Refer to Case Management Department for coordinating discharge planning if the patient needs post-hospital services based on physician/advanced practitioner order or complex needs related to functional status, cognitive ability, or social support system  Outcome: Progressing     Problem: Nutrition/Hydration-ADULT  Goal: Nutrient/Hydration intake appropriate for improving, restoring or maintaining nutritional needs  Description: Monitor and assess patient's nutrition/hydration status for malnutrition  Collaborate with interdisciplinary team and initiate plan and interventions as ordered  Monitor patient's weight and dietary intake as ordered or per policy  Utilize nutrition screening tool and intervene as necessary  Determine patient's food preferences and provide high-protein, high-caloric foods as appropriate       INTERVENTIONS:  - Monitor oral intake, urinary output, labs, and treatment plans  - Assess nutrition and hydration status and recommend course of action  - Evaluate amount of meals eaten  - Assist patient with eating if necessary   - Allow adequate time for meals  - Recommend/ encourage appropriate diets, oral nutritional supplements, and vitamin/mineral supplements  - Order, calculate, and assess calorie counts as needed  - Recommend, monitor, and adjust tube feedings and TPN/PPN based on assessed needs  - Assess need for intravenous fluids  - Provide specific nutrition/hydration education as appropriate  - Include patient/family/caregiver in decisions related to nutrition  Outcome: Progressing

## 2022-09-02 NOTE — PROGRESS NOTES
OT Daily Treatment Note       09/02/22 0051   Pain Assessment   Pain Assessment Tool 0-10   Pain Score No Pain   Restrictions/Precautions   Precautions Bed/chair alarms;Cognitive; Fall Risk;Supervision on toilet/commode   Lifestyle   Autonomy "Please do not have me go to [facility]"   Oral Hygiene   Type of Assistance Needed Set-up / clean-up   Physical Assistance Level No physical assistance   Comment seated in WC at sink   Oral Hygiene CARE Score 5   Shower/Bathe Self   Type of Assistance Needed Physical assistance   Physical Assistance Level 26%-50%   Comment due to enviromental limitations, sponge bath was completed for todays session seated/standing from [facility]  while seated pt only req assistance for back  in stance with BL UE on bedrail, pt req TA for bathing of buttock  pt able to maintain stance with supervision  Shower/Bathe Self CARE Score 3   Bathing   Assessed Bath Style Sponge Bath   Anticipated D/C Bath Style Sponge Bath;Tub   Able to Gather/Transport No   Able to Wash/Rinse/Dry (body part) Left Arm;Right Arm;L Upper Leg;R Upper Leg;L Lower Leg/Foot;R Lower Leg/Foot; Abdomen; Chest;Perineal Area   Limitations Noted in Balance; Coordination   Positioning Seated;Standing   Upper Body Dressing   Type of Assistance Needed Physical assistance   Physical Assistance Level 26%-50%   Comment seated in WC, assistance to pull down back   Upper Body Dressing CARE Score 3   Lower Body Dressing   Type of Assistance Needed Physical assistance   Physical Assistance Level 76% or more   Comment seated in WC, req TA to thread over BL feet and then Max A in stance to don over BL hips   Pt utilized RUE to assist with donning over R hip   Lower Body Dressing CARE Score 2   Putting On/Taking Off Footwear   Type of Assistance Needed Physical assistance   Physical Assistance Level 76% or more   Comment Max A to don RLE, TA for LLE   Putting On/Taking Off Footwear CARE Score 2   Sit to Lying   Type of Assistance Needed Supervision Physical Assistance Level No physical assistance   Comment inc time to complete with use of R bedrail   Sit to Lying CARE Score 4   Lying to Sitting on Side of Bed   Type of Assistance Needed Physical assistance   Physical Assistance Level 26%-50%   Comment Min A to sit on L EOB, assisted with bringing LLE over edge of bed   Lying to Sitting on Side of Bed CARE Score 3   Sit to Stand   Type of Assistance Needed Physical assistance   Physical Assistance Level 25% or less   Comment Min A with BL UE on bedrails   Sit to Stand CARE Score 3   Bed-Chair Transfer   Type of Assistance Needed Physical assistance   Physical Assistance Level 51%-75%   Comment mod A to sit pivot transfer bed bed <> WC   Chair/Bed-to-Chair Transfer CARE Score 2   Toileting Hygiene   Type of Assistance Needed Physical assistance   Physical Assistance Level 76% or more   Comment Max A x 1 standing from toilet with BL UE on grab bars for support  TA for ervin care and CM but pt able to assist while maintaining stance without assistance   Toileting Hygiene CARE Score 2   Toilet Transfer   Type of Assistance Needed Physical assistance   Physical Assistance Level 51%-75%   Comment Mod A sit pivot from [facility] > toilet with use of grab bar for UE support   Toilet Transfer CARE Score 2   Functional Standing Tolerance   Time 3 x 2 minutes   Activity dynamic standing balance   Comments pt stood with BL UE on bedrail  once stabilized pt removed LUE and completing functional reach in all quadrants with CGA 5x then switched to RUE and completed the same  completed this task 3x before requesting an extended rest breakv   Therapeutic Excerise-Strength   UE Strength Yes   Right Upper Extremity- Strength   R Shoulder Flexion;ABduction; Extension;Horizontal ABduction   R Elbow Elbow flexion;Elbow extension   R Position Seated   Equipment Theraband  (orange)   R Weight/Reps/Sets 10 x 2   RUE Strength Comment completed seated UE theraband exercises with focusing on bilateral integration and getting LUE into full extension during exercises  UE strengtheing completed to increase independence with functional transfers   Left Upper Extremity-Strength   L Shoulder Flexion;ABduction; Extension;Horizontal ABduction   L Elbow Elbow flexion;Elbow extension   L Position Seated   Equipment Theraband  (orange)   L Weights/Reps/Sets 10 x 2   LUE Strength Comment completed seated UE theraband exercises with focusing on bilateral integration and getting LUE into full extension during exercises  UE strengtheing completed to increase independence with functional transfers   Neuromuscular Education   Weight Bearing Technique Yes   LUE Weight Bearing Extended arm seated   Response to Weight Bearing Technique only able to completely extend elbow by provided demonstration and asking pt to "do what I'm doing"  unable to extend elbow independently of OT   Cognition   Overall Cognitive Status Impaired   Arousal/Participation Alert; Cooperative   Attention Attends with cues to redirect   Orientation Level Oriented X4   Memory Decreased short term memory;Decreased recall of recent events   Following Commands Follows one step commands with increased time or repetition   Activity Tolerance   Activity Tolerance Patient tolerated treatment well   Assessment   Treatment Assessment Pt participated in skilled OT session with focus on increasing independence with ADL completion and UE strength/ROM  Pt was found seated upright in bed  VS were taken and were WNL  See flowsheet for details regarding session  Pts son was present at end of session and was asking for updates regarding pts current progress and DC planning  OT explained that the CM will be able to provide more information but due to the pt being unable to have 24 hr assistance at home, the recommendation will be for HERBERT  Pt stated that she does not want to go to [facility], son agreed  OT deferd to CM regarding which SNFs would be in-network for the pt  Pts son then asked questions regarding WC purchasing  OT explained that once a DC date is set, all required equipment will be ordered  Pts son then spoke with IZAIAH Hyatt  Pt is limited by LUE tone and ROM, dynamic standing balance and cognition and requires skilled OT services to increase independence with ADL completion with goal of HERBERT  Plan to continue with ADL re-training, LUE strengthening with A/PROM and dynamic standing balance tasks  Prognosis Fair   Problem List Decreased strength;Decreased range of motion;Decreased endurance; Impaired balance;Decreased coordination;Decreased mobility; Decreased cognition; Impaired judgement;Decreased safety awareness; Impaired tone   Barriers to Discharge Inaccessible home environment;Decreased caregiver support   Plan   Treatment/Interventions ADL retraining;Functional transfer training; Therapeutic exercise; Endurance training;Cognitive reorientation;Patient/family training;Equipment eval/education; Compensatory technique education   Progress Progressing toward goals   Recommendation   OT Discharge Recommendation Post acute rehabilitation services   OT Therapy Minutes   OT Time In 0830   OT Time Out 1000   OT Total Time (minutes) 90   OT Mode of treatment - Individual (minutes) 90   OT Mode of treatment - Concurrent (minutes) 0   OT Mode of treatment - Group (minutes) 0   OT Mode of treatment - Co-treat (minutes) 0   OT Mode of Treatment - Total time(minutes) 90 minutes   OT Cumulative Minutes 1090   Therapy Time missed   Time missed?  No

## 2022-09-02 NOTE — NURSING NOTE
Patient given first antibiotic dose after obtaining urine for UA  Urine cloudy, noted with sedimentation, and had foul odor

## 2022-09-02 NOTE — PROGRESS NOTES
Internal Medicine Progress Note  Patient: Lawyer Dangelo  Age/sex: 80 y o  female  Medical Record #: [id number]      ASSESSMENT/PLAN: (Interval History)  Lawyer Dangelo is seen and examined and management for following issues:    Acute embolic right MCA CVA  · Had near occlusion of right MCA  · Felt to be a Xarelto failure and was switched to Pradaxa  · ECHO w/o thrombus  · Continue statin  · On Baclofen for tone left arm = much improved     Chronic atrial fibrillation  · Sees Dr Shannon Wells as OP  · Rates controlled  · On Pradaxa now, Xarelto stopped     PPM  · VVI  · Is not MRI conditional  · 100% paced on EKG     Valvular disease  · Current ECHO:  LVEF 65%, mod AR/mild AS, mod TR with severely elevated RV systolic pressure, mild-mod MR, mod LAE/mild NEO  · Euvolemic currently     HTN  · Home:  Verapamil 240mg qhs/Cozaar 150 mg qd/Bystolic 5mg qd/lasix 07UV qd/Aldactone 25mg qd  · Here:  Verapamil 240mg qhs/Cozaar 343GU qd/Bystolic 5mg qd  · OP note states has left RA stenosis but no testing in OP records or Care Everywhere to verify  · No changes again today     HLD  · Home:  Crestor 10mg qd = Neuro rec when discharged to reduce to 5mg qd  · Here:  Pravachol 40mg qd     DARRIUS  · S/p NS 1 liter   · Back to baseline  · Continue to hold Lasix and Aldactone for now     CAD  · Nonobstructive  · Card cath 8/2021 done for borderline abn stress test and found 50% mid RCA/40% mid LAD = no intervention done  · Continue statin  · Was not on ASA as an OP     Hypomagnesemia  · Takes here and at home 400 mg MagOx  · Mg level 9/1 was 2 1     Left chest pain  · Had left neck pain (not new for her) then pain under left breast = reproducible with palpation  · EKG was 100% v-paced but no obvious acute changes; trops negative Bengay applied  · At the time, felt likely M/S from spasm  She then had worsening pain in chest and described pressure so RR called    · Trops = 29/-, 28/-1, 28/-1  · 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   ·  Depadua inc Baclofen to 5mg QID on 8/29   Has 5mg qhs prn as well       Cough  · CXR negative 8/25  · Dr Mirna Johnson added Pepcid in case etio of cough is 2/2 GERD  · Has prn Robitussin and scheduled Tessalon perles 200mg TID  · Was on Flonase = stopped since not effective  · Has been on the Cozaar for quite a while she says so dont suspect this is the cause  · Thought to be coughing on saliva per ST but she did well otherwise with their eval and they did not change her diet  · Now I am told has grayish sputum with occ pinkish (previously clear) and pt says inc cough so will try tx'ing for tracheobronchitis with Zithromax 500mg x 1 then 250mg qd x 4     Urine retention  · Getting str cath'd  · Management per PMR  · Flomax added on 8/31/22     Vaginal yeast infection  · Is receiving Monistat pack and gave 1 dose of Diflucan 150mg         DC planning: [facility]       The above assessment and plan was reviewed and updated as determined by my evaluation of the patient on 9/2/2022      Labs:   Results from last 7 days   Lab Units 09/01/22  0654 08/29/22  0537   WBC Thousand/uL 6 92 10 21*   HEMOGLOBIN g/dL 11 7 11 3*   HEMATOCRIT % 36 8 36 3   PLATELETS Thousands/uL 263 317     Results from last 7 days   Lab Units 09/01/22  0654 08/29/22  0537   SODIUM mmol/L 136 135   POTASSIUM mmol/L 4 3 4 3   CHLORIDE mmol/L 106 105   CO2 mmol/L 26 26   BUN mg/dL 28* 29*   CREATININE mg/dL 1 04 1 10   CALCIUM mg/dL 9 1 9 3                   Review of Scheduled Meds:  Current Facility-Administered Medications   Medication Dose Route Frequency Provider Last Rate    acetaminophen  650 mg Oral Q6H PRN Aleah Sanford MD      baclofen  5 mg Oral 4x Daily Reny Correa MD      benzonatate  200 mg Oral TID Teofilo Counts, CRNP      bisacodyl  10 mg Rectal Daily PRN Aleah Sanford MD      dabigatran etexilate  150 mg Oral Q12H [facility] Aleah Sanford MD      famotidine  20 mg Oral Daily Reny Correa MD      guaiFENesin  200 mg Oral Q6H PRN TOMY Rodas      losartan  100 mg Oral Daily Fatimah Sarmiento MD      magnesium oxide  400 mg Oral Daily Fatimah Sarmiento MD      melatonin  3 mg Oral QPM Paige Diaz MD      menthol-methyl salicylate   Apply externally BID Paige Diaz MD      miconazole   Topical BID PRN TOMY Rodas      nebivolol  5 mg Oral Daily Fatimah Sarmiento MD      nitroglycerin  0 4 mg Sublingual Q5 Min PRN TOMY Chow      phenol  1 spray Mouth/Throat Q2H PRN Paige Diaz MD      polyethylene glycol  17 g Oral Daily PRN Fatimah Sarmiento MD      pravastatin  40 mg Oral Daily With Lakeisha Goldberg MD      tamsulosin  0 4 mg Oral Daily With Sha Burgess MD      verapamil  240 mg Oral HS Fatimah Sarmiento MD         Subjective/ HPI: Patient seen and examined  Patients overnight issues or events were reviewed with nursing or staff during rounds or morning huddle session  New or overnight issues include the following:     No new or overnight issues  Offers no complaints    ROS:   A 10 point ROS was performed; negative except as noted above       *Labs /Radiology studies reviewed  *Medications reviewed and reconciled as needed  *Please refer to order section for additional ordered labs studies  *Case discussed with primary attending during morning huddle case rounds    Physical Examination:  Vitals:   Vitals:    09/01/22 0551 09/01/22 1424 09/01/22 2055 09/02/22 0542   BP: 127/60 127/61 122/58 102/55   BP Location: Right arm Right arm Left arm Left arm   Pulse: 65 73 65 60   Resp: 20 18 20 18   Temp: 97 7 °F (36 5 °C) 97 8 °F (36 6 °C) 97 9 °F (36 6 °C) 97 8 °F (36 6 °C)   TempSrc: Oral Oral Oral Oral   SpO2: 98% 96% 98% 97%   Weight:       Height:           General Appearance: no distress, conversive  HEENT: PERRLA, conjuctiva normal; oropharynx clear; mucous membranes moist   Neck:  Supple, normal ROM  Lungs: CTA, normal respiratory effort, no retractions, expiratory effort normal  CV: regular rate and rhythm; no rubs/murmurs/gallops, PMI normal   ABD: soft; ND/NT; +BS  EXT: no edema  Skin: normal turgor, normal texture, no rashes  Psych: affect normal, mood normal  Neuro: AAO      The above physical exam was reviewed and updated as determined by my evaluation of the patient on 9/2/2022  Invasive Devices  Report    None                    VTE Pharmacologic Prophylaxis: Pradaxa  Code Status: Level 1 - Full Code  Current Length of Stay: 14 day(s)      Total time spent:  30 minutes with more than 50% spent counseling/coordinating care  Counseling includes discussion with patient re: progress  and discussion with patient of his/her current medical state/information  Coordination of patient's care was performed in conjunction with primary service  Time invested included review of patient's labs, vitals, and management of their comorbidities with continued monitoring  In addition, this patient was discussed with medical team including physician and advanced extenders  The care of the patient was extensively discussed and appropriate treatment plan was formulated unique for this patient  ** Please Note:  voice to text software may have been used in the creation of this document   Although proof errors in transcription or interpretation are a potential of such software**

## 2022-09-02 NOTE — PROGRESS NOTES
09/02/22 1400   Pain Assessment   Schulte-Castillo FACES Pain Rating 4   Pain Location/Orientation Orientation: Left; Location: Neck   Effect of Pain on Daily Activities STM to L upper trap to help alleviate c/o pain   Restrictions/Precautions   Precautions Bed/chair alarms;Cognitive; Fall Risk;Supervision on toilet/commode   Weight Bearing Restrictions No   ROM Restrictions No   Braces or Orthoses AFO  (LLE)   Subjective   Subjective "I keep saying the same word, it's not Dominican or English, and I don't know why I'm trying to say it "   Sit to Lying   Type of Assistance Needed Physical assistance   Physical Assistance Level 26%-50%   Comment min-modAx1 for LE management   Sit to Lying CARE Score 3   Lying to Sitting on Side of Bed   Type of Assistance Needed Physical assistance   Physical Assistance Level 26%-50%   Comment HOB elevated slightly, minAx1 for LLE; use of R bedrail   Lying to Sitting on Side of Bed CARE Score 3   Sit to Stand   Type of Assistance Needed Physical assistance   Physical Assistance Level 76% or more   Comment pt fluctuates greatly pending what she can hold onto when standing  If pt has grab bar pt minAx1   W/o any device to hold onto and just with therapist, pt maxAx1 with severe retropulsion and extensor posture; does worse when pushing from chair   Sit to Stand CARE Score 2   Bed-Chair Transfer   Type of Assistance Needed Physical assistance   Physical Assistance Level 51%-75%   Comment mod-maxAx1 sit pivot transfers; fluctuating throughout tx   Chair/Bed-to-Chair Transfer CARE Score 2   Car Transfer   Type of Assistance Needed Physical assistance   Physical Assistance Level 51%-75%   Comment mod-maxAx1 for car transfer; requires A to get legs into simulated car   Car Transfer CARE Score 2   Walk 10 Feet   Type of Assistance Needed Physical assistance   Physical Assistance Level Total assistance   Walk 10 Feet CARE Score 1   Walk 50 Feet with Two Turns   Type of Assistance Needed Physical assistance   Physical Assistance Level Total assistance   Walk 50 Feet with Two Turns CARE Score 1   Walk 150 Feet   Type of Assistance Needed Physical assistance   Physical Assistance Level Total assistance   Walk 150 Feet CARE Score 1   Walking 10 Feet on Uneven Surfaces   Reason if not Attempted Safety concerns   Walking 10 Feet on Uneven Surfaces CARE Score 88   Ambulation   Does the patient walk? 2  Yes   Primary Mode of Locomotion Prior to Admission Walk   Distance Walked (feet) 150 ft  (x1, 70'x1)   Assist Device Hand Hold  (R HHA)   Gait Pattern Inconsistant Linda; Slow Linda;Decreased foot clearance;L foot drag;L hemiparesis; Narrow LUCY;Retropulsion; Step to; Step through; Decreased L stance; Improper weight shift; Poor UE WB   Limitations Noted In Balance; Coordination; Endurance; Heel Strike;Posture; Safety; Sensation; Sequencing;Speed;Strength;Swing   Provided Assistance with: Balance;Direction;Trunk Support;Weight Shift   Walk Assist Level Maximum Assist;Moderate Assist;Chair Follow   Findings mod-maxAx1 RHHA with gait belt and CF for safety; pt does fatigue with ambulation, requires maxAx1 for weight shifting to R to allow LLE to swing through  At times requires cues to clear the floor due to L foot drag  Use of L AFO and shoes donned  Wheelchair mobility   Findings not focus of this session   Stairs   Findings not focus of this session   Picking Up Object   Reason if not Attempted Safety concerns   Picking Up Object CARE Score 88   Toilet Transfer   Type of Assistance Needed Physical assistance   Physical Assistance Level 51%-75%   Comment mod-maxAx1 stand pivot transfer with use of grab bars   Toilet Transfer CARE Score 2   Toilet Transfer   Findings SHERRI Stephen and Jeff Caro observing urine output and marked; noted odor, cloudiness in urine, Demetria and Hailee aware     Therapeutic Interventions   Flexibility PROM to LLE to reduce tone start of session 5', RLE ham/gastroc stretch 3x30 sec hold; L upper trap STM 5' Neuromuscular Re-Education alt step taps onto 6" step with RUE support on railing, x2 person A as one person mod-maxAx1 for LLE up/down step and max vc's for sequencing  Performed 2' as pt then fatigued and needing to sit  Pt refused to perform further trials  Also performed HHA amb x2   Equipment Use   NuStep lvl 1, 10 mins, all ext; removed LUE when pt fatigued; performed for neural priming   Assessment   Treatment Assessment 90 min skilled PT session focusing on transfer trng, NPP interventions focusing on repetition, specficity and intensity, improving LE ROM and reducing tone, and improving pt's activity tolerance  Start of session pt reporting to RN Inocente Balbuena that she keeps saying a word that is not an [place] or [place] word, makes no sense, and pt knows it makes no sense but doesn't know why she says it  Pt was noted to be saying it when SPT present x4  However during session, pt did not mention this word and her cognition appeared to improve as well  Pt was asked about word later during session and states she was trying to say the RN's name (Inocente Balbuena) and not that word  Noted when taking pt to bathroom that urine with odor and cloudiness, RN Hailee and Robert Barton both aware and observed  Pt's fatigue limiting activity tolerance this session, was only able to amb for 2 walks this session and only able to perform 1 round of alt step taps before pt refusing to trial further  Also noted at times during transfers pt going into extensor posture and retropulsive, difficulty following directions, and requires maxAx1 to safely complete transfer  At this time cont with POC w/c propulsion, improving pt's activity tolerance, gait trng, NMR to improve pt's balance, coordination, and righting reactions  Family/Caregiver Present no   Problem List Decreased strength;Decreased range of motion;Decreased endurance; Impaired balance;Decreased coordination;Decreased mobility; Decreased cognition; Impaired judgement;Decreased safety awareness; Impaired tone   Barriers to Discharge Inaccessible home environment;Decreased caregiver support   PT Barriers   Functional Limitation Car transfers;Stair negotiation;Standing;Transfers; Walking; Wheelchair management   Plan   Treatment/Interventions Functional transfer training;LE strengthening/ROM; Elevations; Therapeutic exercise; Endurance training;Cognitive reorientation;Patient/family training;Bed mobility;Gait training; Compensatory technique education   Progress Progressing toward goals   Recommendation   PT Discharge Recommendation   (SNF)   PT Therapy Minutes   PT Time In 1400   PT Time Out 1530   PT Total Time (minutes) 90   PT Mode of treatment - Individual (minutes) 90   PT Mode of treatment - Concurrent (minutes) 0   PT Mode of treatment - Group (minutes) 0   PT Mode of treatment - Co-treat (minutes) 0   PT Mode of Treatment - Total time(minutes) 90 minutes   PT Cumulative Minutes 1195   Therapy Time missed   Time missed?  No

## 2022-09-03 PROBLEM — R05.9 COUGH: Status: ACTIVE | Noted: 2022-09-03

## 2022-09-03 PROCEDURE — 97112 NEUROMUSCULAR REEDUCATION: CPT

## 2022-09-03 PROCEDURE — 87186 SC STD MICRODIL/AGAR DIL: CPT | Performed by: STUDENT IN AN ORGANIZED HEALTH CARE EDUCATION/TRAINING PROGRAM

## 2022-09-03 PROCEDURE — 87077 CULTURE AEROBIC IDENTIFY: CPT | Performed by: STUDENT IN AN ORGANIZED HEALTH CARE EDUCATION/TRAINING PROGRAM

## 2022-09-03 PROCEDURE — 99232 SBSQ HOSP IP/OBS MODERATE 35: CPT | Performed by: NURSE PRACTITIONER

## 2022-09-03 PROCEDURE — 97530 THERAPEUTIC ACTIVITIES: CPT

## 2022-09-03 PROCEDURE — 97130 THER IVNTJ EA ADDL 15 MIN: CPT

## 2022-09-03 PROCEDURE — 97116 GAIT TRAINING THERAPY: CPT

## 2022-09-03 PROCEDURE — 97129 THER IVNTJ 1ST 15 MIN: CPT

## 2022-09-03 PROCEDURE — 87086 URINE CULTURE/COLONY COUNT: CPT | Performed by: STUDENT IN AN ORGANIZED HEALTH CARE EDUCATION/TRAINING PROGRAM

## 2022-09-03 RX ADMIN — BENZONATATE 200 MG: 100 CAPSULE ORAL at 08:55

## 2022-09-03 RX ADMIN — PRAVASTATIN SODIUM 40 MG: 40 TABLET ORAL at 16:28

## 2022-09-03 RX ADMIN — BACLOFEN 5 MG: 10 TABLET ORAL at 21:12

## 2022-09-03 RX ADMIN — BACLOFEN 5 MG: 10 TABLET ORAL at 08:55

## 2022-09-03 RX ADMIN — DABIGATRAN ETEXILATE MESYLATE 150 MG: 150 CAPSULE ORAL at 08:55

## 2022-09-03 RX ADMIN — BISACODYL 10 MG: 10 SUPPOSITORY RECTAL at 18:18

## 2022-09-03 RX ADMIN — BENZONATATE 200 MG: 100 CAPSULE ORAL at 16:27

## 2022-09-03 RX ADMIN — BACLOFEN 5 MG: 10 TABLET ORAL at 18:18

## 2022-09-03 RX ADMIN — NEBIVOLOL 5 MG: 5 TABLET ORAL at 08:56

## 2022-09-03 RX ADMIN — MENTHOL, METHYL SALICYLATE 1 APPLICATION: 10; 15 CREAM TOPICAL at 08:56

## 2022-09-03 RX ADMIN — BENZONATATE 200 MG: 100 CAPSULE ORAL at 21:12

## 2022-09-03 RX ADMIN — GUAIFENESIN 600 MG: 600 TABLET, EXTENDED RELEASE ORAL at 08:55

## 2022-09-03 RX ADMIN — ACETAMINOPHEN 650 MG: 325 TABLET ORAL at 06:25

## 2022-09-03 RX ADMIN — BACLOFEN 5 MG: 10 TABLET ORAL at 12:08

## 2022-09-03 RX ADMIN — DABIGATRAN ETEXILATE MESYLATE 150 MG: 150 CAPSULE ORAL at 21:13

## 2022-09-03 RX ADMIN — Medication 3 MG: at 21:13

## 2022-09-03 RX ADMIN — GUAIFENESIN 600 MG: 600 TABLET, EXTENDED RELEASE ORAL at 21:12

## 2022-09-03 RX ADMIN — Medication 1 SPRAY: at 08:57

## 2022-09-03 RX ADMIN — MAGNESIUM OXIDE TAB 400 MG (241.3 MG ELEMENTAL MG) 400 MG: 400 (241.3 MG) TAB at 08:55

## 2022-09-03 RX ADMIN — LOSARTAN POTASSIUM 100 MG: 50 TABLET, FILM COATED ORAL at 08:55

## 2022-09-03 RX ADMIN — AZITHROMYCIN MONOHYDRATE 250 MG: 250 TABLET ORAL at 13:47

## 2022-09-03 RX ADMIN — ACETAMINOPHEN 650 MG: 325 TABLET ORAL at 21:12

## 2022-09-03 RX ADMIN — ACETAMINOPHEN 650 MG: 325 TABLET ORAL at 12:10

## 2022-09-03 RX ADMIN — Medication 1 SPRAY: at 15:15

## 2022-09-03 RX ADMIN — FAMOTIDINE 20 MG: 20 TABLET, FILM COATED ORAL at 08:55

## 2022-09-03 RX ADMIN — MENTHOL, METHYL SALICYLATE 1 APPLICATION: 10; 15 CREAM TOPICAL at 18:19

## 2022-09-03 RX ADMIN — TAMSULOSIN HYDROCHLORIDE 0.4 MG: 0.4 CAPSULE ORAL at 16:28

## 2022-09-03 NOTE — PROGRESS NOTES
09/03/22 1515   Pain Assessment   Pain Assessment Tool 0-10   Pain Score No Pain   Restrictions/Precautions   Precautions Bed/chair alarms;Cognitive; Fall Risk;Supervision on toilet/commode   Comprehension   Comprehension (FIM) 4 - Understands basic info/conversation 75-90% of time   Expression   Expression (FIM) 5 - Needs help/cues only RARELY (< 10% of the time)   Social Interaction   Social Interaction (FIM) 5 - Interacts appropriately with others 90% of time   Problem Solving   Problem solving (FIM) 4 - Solves basic problems 75-89% of time   Memory   Memory (FIM) 4 - Recognizes/recalls/performs 75-89%   Speech/Language/Cognition Assessment   Treatment Assessment Pt participated in skilled ST session focusing on cognitive linguistic skills  At beginning of session, pt's son Marlon Lopez was present  Pt brought up review of recent events which have been occurring to which pt and pt's so then began recalling events  Focus was primarily on pt's increase in saliva/mucous, spitting into basin and coughing episodes which occur outside of eating  SLP asked follow up question about swallowing to which pt currently denies difficulty eating or drinking but does stated that she often feels nauseous now, which jacy her from wanting to eat her meals  Pt's son also brought up an instance that occurred where pt experienced a word finding difficulty as she attempted to say the nurses name, but began repeating an unintelligible word  Pt's son feels this may have been a word used in [place] to describe pt's throat clearing and bringing up saliva  Pt later however, informed SLP that she did not know the meaning of the word that she had been repeating during that time  Pt denies any continued word finding and states that it has not occurred since   Checking on pt's word finding skills, pt was provided with a categories of concrete concepts to which she was able to state words belonging to categories for 18/18 opportunities, noting multiple words per category  No overt word finding deficits noted at this time or in conversation  In review of recent sessions which recommended reviewing medications, pt then engaged in a medication management task focusing on review of her medication list as her list has recently changed due to medications being added for UTI and to improve saliva/mucous  Given the name of her current medications, pt recalled the reason/purpose of 5/12 medications independently, requiring education for remaining medications  During this time, pt appeared with good awareness of medications which are new to her this admission vs those that she was taking at home  Discussed likely plan to target medication management further in the future-pt in agreement  At this time pt will continue to benefit from continued skilled SLP services to maximize overall functional cognitive linguistic skills in order to lessen caregiver burden on discharge  SLP Therapy Minutes   SLP Time In 8433   SLP Time Out 1550   SLP Total Time (minutes) 35   SLP Mode of treatment - Individual (minutes) 35   SLP Mode of treatment - Concurrent (minutes) 0   SLP Mode of treatment - Group (minutes) 0   SLP Mode of treatment - Co-treat (minutes) 0   SLP Mode of Treatment - Total time(minutes) 35 minutes   SLP Cumulative Minutes 365   Therapy Time missed   Time missed?  No

## 2022-09-03 NOTE — PROGRESS NOTES
09/03/22 1300   Pain Assessment   Pain Assessment Tool 0-10   Pain Score No Pain   Restrictions/Precautions   Precautions Bed/chair alarms;Cognitive; Fall Risk;Supervision on toilet/commode   Braces or Orthoses AFO  (LLE)   Lifestyle   Autonomy "I need you to bring the pink container"   Sit to Stand   Type of Assistance Needed Physical assistance   Physical Assistance Level 51%-75%   Comment Min-ModA this session but requires step by step cues for safe technique   Sit to Stand CARE Score 2   Kitchen Mobility   Kitchen Mobility Comments Pt in stance at counter top instructed to open cabinet door and place cones in cabinet and then remove  Compelted multiple trials using R hand w/ L as stablizer, L hand w/ R as stabilzier, and then trials using unilateral side and no had as stablizer  pt able to complte all trials w/ CGA, no LOB noted  In stance compelting repetitive trials for over 10min  Pt demo's dec coordination of LUE and reports fatigue w/ repitition  Pt then trials side steps down the side of countertop to trial kitchen mobility  Pt requries maxA overall, assist to advance LLE when stepping towards L  Requires cues for step by step technique and for safety  Noted retropulsion  Assessment   Treatment Assessment OT session focusing on simulated kitchen mobility and fxnl reach  Pt coninues w/ perseveration this session regarding her reported need to spit into emesis basin and continually wipe her mouth  RN aware  Pt continues to demo significant fxnl deficits and skilled OT continues to be warranted  Prognosis Fair   Problem List Decreased strength;Decreased range of motion;Decreased endurance; Impaired balance;Decreased mobility; Decreased coordination;Decreased cognition; Impaired judgement;Decreased safety awareness; Impaired tone   Plan   Treatment/Interventions Functional transfer training;ADL retraining; Therapeutic exercise; Endurance training;Cognitive reorientation;Patient/family training;Equipment eval/education; Compensatory technique education;Continued evaluation;Spoke to nursing   Progress Progressing toward goals   OT Therapy Minutes   OT Time In 1300   OT Time Out 1330   OT Total Time (minutes) 30   OT Mode of treatment - Individual (minutes) 30   OT Mode of treatment - Concurrent (minutes) 0   OT Mode of treatment - Group (minutes) 0   OT Mode of treatment - Co-treat (minutes) 0   OT Mode of Treatment - Total time(minutes) 30 minutes   OT Cumulative Minutes 1180   Therapy Time missed   Time missed?  No

## 2022-09-03 NOTE — PLAN OF CARE
Problem: MOBILITY - ADULT  Goal: Maintain or return to baseline ADL function  Description: INTERVENTIONS:  -  Assess patient's ability to carry out ADLs; assess patient's baseline for ADL function and identify physical deficits which impact ability to perform ADLs (bathing, care of mouth/teeth, toileting, grooming, dressing, etc )  - Assess/evaluate cause of self-care deficits   - Assess range of motion  - Assess patient's mobility; develop plan if impaired  - Assess patient's need for assistive devices and provide as appropriate  - Encourage maximum independence but intervene and supervise when necessary  - Involve family in performance of ADLs  - Assess for home care needs following discharge   - Consider OT consult to assist with ADL evaluation and planning for discharge  - Provide patient education as appropriate  Outcome: Progressing  Goal: Maintains/Returns to pre admission functional level  Description: INTERVENTIONS:  - Set and communicate daily mobility goal to care team and patient/family/caregiver  - Collaborate with rehabilitation services on mobility goals if consulted  - Perform Range of Motion 3 times a day  - Reposition patient every 2 hours    - Dangle patient 3 times a day  - Stand patient 3 times a day  - Ambulate patient 3 times a day  - Out of bed to chair 3 times a day   - Out of bed for meals 3 times a day  - Out of bed for toileting  - Record patient progress and toleration of activity level   Outcome: Progressing     Problem: Prexisting or High Potential for Compromised Skin Integrity  Goal: Skin integrity is maintained or improved  Description: INTERVENTIONS:  - Identify patients at risk for skin breakdown  - Assess and monitor skin integrity  - Assess and monitor nutrition and hydration status  - Monitor labs   - Assess for incontinence   - Turn and reposition patient  - Assist with mobility/ambulation  - Relieve pressure over bony prominences  - Avoid friction and shearing  - Provide appropriate hygiene as needed including keeping skin clean and dry  - Evaluate need for skin moisturizer/barrier cream  - Collaborate with interdisciplinary team   - Patient/family teaching  - Consider wound care consult   Outcome: Progressing     Problem: Potential for Falls  Goal: Patient will remain free of falls  Description: INTERVENTIONS:  - Educate patient/family on patient safety including physical limitations  - Instruct patient to call for assistance with activity   - Consult OT/PT to assist with strengthening/mobility   - Keep Call bell within reach  - Keep bed low and locked with side rails adjusted as appropriate  - Keep care items and personal belongings within reach  - Initiate and maintain comfort rounds  - Make Fall Risk Sign visible to staff  - Offer Toileting every 2-4 Hours, in advance of need  - Initiate/Maintain bed/chair alarm  - Obtain necessary fall risk management equipment: nonskid footwear  - Apply yellow socks and bracelet for high fall risk patients  - Consider moving patient to room near nurses station  Outcome: Progressing     Problem: PAIN - ADULT  Goal: Verbalizes/displays adequate comfort level or baseline comfort level  Description: Interventions:  - Encourage patient to monitor pain and request assistance  - Assess pain using appropriate pain scale  - Administer analgesics based on type and severity of pain and evaluate response  - Implement non-pharmacological measures as appropriate and evaluate response  - Consider cultural and social influences on pain and pain management  - Notify physician/advanced practitioner if interventions unsuccessful or patient reports new pain  Outcome: Progressing     Problem: INFECTION - ADULT  Goal: Absence or prevention of progression during hospitalization  Description: INTERVENTIONS:  - Assess and monitor for signs and symptoms of infection  - Monitor lab/diagnostic results  - Monitor all insertion sites, i e  indwelling lines, tubes, and drains  - Monitor endotracheal if appropriate and nasal secretions for changes in amount and color  - [place] appropriate cooling/warming therapies per order  - Administer medications as ordered  - Instruct and encourage patient and family to use good hand hygiene technique  - Identify and instruct in appropriate isolation precautions for identified infection/condition  Outcome: Progressing     Problem: DISCHARGE PLANNING  Goal: Discharge to home or other facility with appropriate resources  Description: INTERVENTIONS:  - Identify barriers to discharge w/patient and caregiver  - Arrange for needed discharge resources and transportation as appropriate  - Identify discharge learning needs (meds, wound care, etc )  - Arrange for interpretive services to assist at discharge as needed  - Refer to Case Management Department for coordinating discharge planning if the patient needs post-hospital services based on physician/advanced practitioner order or complex needs related to functional status, cognitive ability, or social support system  Outcome: Progressing     Problem: Nutrition/Hydration-ADULT  Goal: Nutrient/Hydration intake appropriate for improving, restoring or maintaining nutritional needs  Description: Monitor and assess patient's nutrition/hydration status for malnutrition  Collaborate with interdisciplinary team and initiate plan and interventions as ordered  Monitor patient's weight and dietary intake as ordered or per policy  Utilize nutrition screening tool and intervene as necessary  Determine patient's food preferences and provide high-protein, high-caloric foods as appropriate       INTERVENTIONS:  - Monitor oral intake, urinary output, labs, and treatment plans  - Assess nutrition and hydration status and recommend course of action  - Evaluate amount of meals eaten  - Assist patient with eating if necessary   - Allow adequate time for meals  - Recommend/ encourage appropriate diets, oral nutritional supplements, and vitamin/mineral supplements  - Order, calculate, and assess calorie counts as needed  - Recommend, monitor, and adjust tube feedings and TPN/PPN based on assessed needs  - Assess need for intravenous fluids  - Provide specific nutrition/hydration education as appropriate  - Include patient/family/caregiver in decisions related to nutrition  Outcome: Progressing

## 2022-09-03 NOTE — PROGRESS NOTES
Physical Medicine and Rehabilitation Progress Note  Enrike Alvarado 80 y o  female MRN: [id number]  Unit/Bed#: -01 Encounter: [id number]      Assessment & Plan:     Decline in ADLs and mobility: Functional assessment - slowly improving        FIM  Care Score  Admit Score Recent Score    Total assist  1-100% or 2p    Tot     Max assist 2-51-75%    Sub  To hygiene, LBD    Mod assist 3- 26-50%  Par  Bathing, UBD   Min assist 3- 25% or < Par     CG assist 4  TA     Sup/Setup 4-5 Sup     Mod-I/Indep 6 MI      Transfers   Significant assist     Ambulation    Total assist     Stairs   Total assist/NT      Major barriers:  Hemiparesis, impaired balance, coordination  Dispo & ELOS: May need to transition to SNF      * Arterial ischemic stroke, MCA (middle cerebral artery), right, acute ([facility] [address] )  Assessment & Plan  - Neuro exam stable 9/2 am  - Slow functional recovery - likely will need more extended IP rehab course - patient will need to be higher level prior to returning to community   - R MCA CVA   - CTA head/neck - near occlusive thrombus at the R MCA bifurcation, mild beading of the mid to distal ICAs suspicious for mild fibromuscular dysplasia, and mild atherosclerotic disease at bilateral distal carotid arteries     - no MRI because pacemaker not compatible    - Residual impairments on admission to ARC: L spastic hemiparesis, bowel/bladder dysfunction, possible cog impairments (assess for other impairments during ARC course)     Secondary stroke prevention  - repeat CT angio head and neck in 3 months to re-evaluate thrombus is a outpatient  - Antithrombotic: Pradaxa (switched from Xarelto for possible failure)   - Statin  - Optimal management of blood pressure and diabetes  - Patient at increased risk for stroke particularly early in post-stroke period - monitor neuro exam closely with low threshold to repeat imaging  -  patient and if applicable caregiver on optimal stroke management  - Follow-up with neurology and PCP after d/c   - Continue acute comprehensive interdisciplinary inpatient rehabilitation to include intensive skilled therapies (PT, OT, ST) as outlined with oversight and management by rehabilitation physician as well as inpatient rehab level nursing, case management and weekly interdisciplinary team meetings  - patient does have fair left hand and wrist strength; can use resting hand splint for a few hours during the day and overnight but to not overuse and encourage use hand and wrist  - multi Podus boot on left foot patient can breaks to potentially decrease risk of contracture/skin breakdown   - patient has spastic left upper extremity making subluxation less likely although continue subluxation precautions      Spastic hemiparesis of left dominant side as late effect of cerebral infarction [facility])  Assessment & Plan  Left upper extremity significantly (also some L neck tightness/spasms)  Modified Sada scale 3 in shoulder, and 2 in her elbow  - Has flexion synergy    - Has clonus in pronators and wrist flexors  Baclofen 5mg increased to QID  Monitor given GFR  May be more sleepy initially  > tolerating recently   Prior PMR provider was consider IP botox injections > will need OP follow-up   Gentle range of motion with therapy  Patient does have movement of the hand and wrist and do want to encourage that - can still consider resting hand splint intermittently  Skilled PT OT  Optimal skin hygiene    Neck pain  Assessment & Plan  Stable   With taut muscle bands in left cervical paraspinal and trapezius areas  Gentle stretching  Baclofen 5 mg QID  Monitor closely given GFR  Bengay helpful  Heat not helpful  Manual therapies/modalities helpful     PRN APAP    Urinary retention  Assessment & Plan  - Urinary outputs/PVRs, scans acceptable last day without need for SC  - bladder scan Q4H, monitor output and PVRs, straight cath >450 or if uncomfortable 250-449  - If high SC's in spite of protocol recommend inserting indwelling contreras for bladder rest and future voiding trial  - monitor for retention, incontinence, signs/symptoms of UTI  - ensure optimal bowel management > p/w significant constipation  - recommend toileting program Q2-4 H during day and Q4-6H overnight       DARRIUS (acute kidney injury) ([facility] [address] )  Assessment & Plan  Remains resolved   Medicine consulted  Hold Lasix and Aldactone - likely decreased intake in setting of CVA   Resume diuretics at discretion of medicine  Monitor labs and output    Cough  Assessment & Plan  Saturating well on RA, patient afebrile, without leukocytosis  - Lung exam fairly clear  - CXR 8/25 negative   - Reports intermittent slight sputum stable   - Tessalon Perles TID, PRN Robitussin  - PPI for GERD  - Comgmt with internal medicine   - Low threshold to repeat CXR, risk of aspiration but on regular diet per SLP still        At risk for coping difficulty  Assessment & Plan  Supportive counseling  Psychology consult while in [facility]  Discharged on magnesium daily   Continue for now  Monitor level       CAD (coronary artery disease)  Assessment & Plan  IM consulted and with overall management at their discretion during ARC course  Antithrombotic: Pradaxa  Statin  Optimal blood pressure   Outpatient Cardiology follow-up    Per IM  · "Nonobstructive  · Card cath 8/2021 done for borderline abn stress test and found 50% mid RCA/40% mid LAD = no intervention done  · Continue statin  · Was not on ASA as an OP"       Valvular heart disease  Assessment & Plan  Per IM  · "Current ECHO:  LVEF 65%, mod AR/mild AS, mod TR with severely elevated RV systolic pressure, mild-mod MR, mod LAE/mild NEO  · Euvolemic currently but will watch since CTA on admission showed pulm edema and b/l pleural effusions"  -diuretics at their discretion   -Outpatient cardiology follow-up    At risk for venous thromboembolism (VTE)  Assessment & Plan  SCDs, ambulation, and Pradaxa       Pacemaker  Assessment & Plan  Not MRI compatible    Chronic atrial fibrillation (HCC)  Assessment & Plan  IM consulted and with overall management at their discretion during ARC course  Rate control: Verapamil, Nebivolol   Antithrombotic: Pradaxa       Stage 3 chronic kidney disease [facility])  Assessment & Plan  Lab Results   Component Value Date    EGFR 44 09/02/2022    EGFR 50 09/01/2022    EGFR 46 08/29/2022    CREATININE 1 14 09/02/2022    CREATININE 1 04 09/01/2022    CREATININE 1 10 08/29/2022     Given IVF earlier in course   Avoid nephrotoxic meds  Monitor voiding status  IM consulted  Hyperlipidemia LDL goal <100  Assessment & Plan  Statin    Essential hypertension  Assessment & Plan  Internal medicine consulted and co-management with their service  Monitor vitals with and without activity; monitor for orthostasis  Monitor hemoglobin, electrolytes, kidney function, hydration status   Current meds:  Losartan, nebivolol, Verapamil 240mg daily  - Lasix and Aldactone on hold      Other chest pain  Assessment & Plan  Denies CP currently - remains resolved    Per prior provider  "8/25 with chest pain, ultimately felt to be 2/2 to spasticity  Improved with baclofen/robaxin  - delta hsTrop at 2 hr was -1    - EKG showed paced rhythm, no evidence of ischemia   - CXR unremarkable   - 30 min later had another episode that felt more like pressure  Rapid called, but not concerning for ACS  Generally reproducible   - Increased Baclofen and frequency of Derl Quitter  Has a cough with it, and lungs sound good, and CXR unremarkable  - May also be component of GERD  Started on pepcid  Also on throat spray   Fluticasone was irritating and didn't make a difference    - Had SLP eval swallow - which was fine"        Other Medical Issues:   None    Follow-up providers and other issues to be followed up after discharge  PCP   Neurology   Cardiology    CODE: Level 1: Full Code    Restrictions include: Fall precautions    Objective: Allergies per EMR  Diagnostic Studies: Reviewed, no new imaging  XR chest portable   Final Result by Nicole Salinas MD (52/33 [id number])      No acute cardiopulmonary disease                    Workstation performed: [id number]           See above as well    Laboratory: Labs reviewed  Results from last 7 days   Lab Units 09/01/22  0654 08/29/22  0537   HEMOGLOBIN g/dL 11 7 11 3*   HEMATOCRIT % 36 8 36 3   WBC Thousand/uL 6 92 10 21*     Results from last 7 days   Lab Units 09/01/22  0654 08/29/22  0537   BUN mg/dL 28* 29*   SODIUM mmol/L 136 135   POTASSIUM mmol/L 4 3 4 3   CHLORIDE mmol/L 106 105   CREATININE mg/dL 1 04 1 10            Drug regimen reviewed, all potential adverse effects identified and addressed:    Scheduled Meds:  Current Facility-Administered Medications   Medication Dose Route Frequency Provider Last Rate    acetaminophen  650 mg Oral Q6H PRN Chung Andrade, MD      baclofen  5 mg Oral 4x Daily Lou Mancuso MD      benzonatate  200 mg Oral TID TOMY Berry      bisacodyl  10 mg Rectal Daily PRN Chung Andrade MD      dabigatran etexilate  150 mg Oral Q12H [address] Chung Andrade, MD      famotidine  20 mg Oral Daily Lou Mancuso MD      guaiFENesin  600 mg Oral Q12H [address] TOMY Lawrence      losartan  100 mg Oral Daily Wilkes Flattersharonda, MD      magnesium oxide  400 mg Oral Daily Wilkes Flattery, MD      melatonin  3 mg Oral QPM Lou Mancuso MD      menthol-methyl salicylate   Apply externally BID Lou Mancuso MD      miconazole   Topical BID PRN TOMY Berry      nebivolol  5 mg Oral Daily Wilkes FlatterMD sharonda      nitroglycerin  0 4 mg Sublingual Q5 Min PRN TOMY Lawrence      phenol  1 spray Mouth/Throat Q2H PRN Lou Mancuso MD      polyethylene glycol  17 g Oral Daily PRN Chung Andrade MD      pravastatin  40 mg Oral Daily With Jeffrey Bruno MD      tamsulosin  0 4 mg Oral Daily With Neftaly Rivera MD      verapamil  240 mg Oral HS Solange Ardon MD         Chief Complaints:  L sided weakness     Subjective:     Patient reports stable L sided weakness  Family feels like strength is getting better  She reports stable cough without occasional slight sputum without increased SOB, CP, fever, chills, sweats  Patient denies calf pain, headache, visual changes, abdominal pain, nausea, dysuria, or other new complaints  ROS: A 10 point ROS was performed; negative except as noted above  Physical Exam:  09/02/22 0542 97 8 °F (36 6 °C) 60 18 102/55 -- 97 % None (Room air     Vitals above reviewed on date of encounter    GEN:  Lying in bed in NAD   HEENT/NECK: MMM  CARDIAC: Regular rate rhythm, no murmers, no rubs, no gallops  LUNGS:  clear to auscultation, no wheezes, rales, or rhonchi  ABDOMEN: Soft, non-tender, non-distended, normal active bowel sounds  EXTREMITIES/SKIN:  no calf edema, no calf tenderness to palpation  NEURO:   MENTAL STATUS: awake, oriented to person, place, time, and situation, MENTAL STATUS:  Appropriate wakefulness and interaction , CN II-XII: Mild L facial weakness otherwise intact and Strength/MMT:  LUE prox 3- to 4-, distal 4, LLE prox 3, distal trace, R sided 5/5   PSYCH:  Affect:  Euthymic     HPI:  80-year-old female with a past medical history of AFib recently on Xarelto, sick sinus syndrome status status post pacemaker placement that is not MRI compatible, hypertension, hyperlipidemia, valvular disease, coronary artery disease developed dense left-sided weakness, dysarthria and presented to the hospital on 08/12/2022  She had systolic blood pressure of 200 on arrival   CT head showed focal area of gliosis within the high right post central gyrus and scattered chronic microvascular ischemic changes with more focal lucency along the anterior limb of the right internal capsule    CTA of head and neck showed near occlusive thrombus at the right MCA bifurcation, mild beating of the mid to distal ICA suspicious mild fibromuscular dysplasia and mild atherosclerotic disease at the bilateral distal carotid arteries  She was not a candidate for MRI due to pacemaker  CT cerebral perfusion study showed 8 mL of mismatch  She was not a candidate for tPA due to bleeding risk from being on Xarelto  She was not a thrombectomy candidate for neurointerventional   Patient was recommended to transition to Pradaxa  Patient was evaluated by skilled therapies and was found to have significant decline in ADLs and ambulation and [facility]  ** Please Note: Fluency Direct voice to text software may have been used in the creation of this document  **    35 minutes or greater spent face-to-face with patient (and family) during this encounter and with coordination of care  Extended discussion today held with patient regarding current condition, medical history, mood, medical/rehabilitation management, and disposition

## 2022-09-03 NOTE — PROGRESS NOTES
09/03/22 0830   Pain Assessment   Pain Assessment Tool 0-10   Pain Score No Pain   Restrictions/Precautions   Precautions Bed/chair alarms;Cognitive; Fall Risk;Supervision on toilet/commode   Braces or Orthoses AFO   Sit to Stand   Type of Assistance Needed Physical assistance   Physical Assistance Level 51%-75%   Comment needs lift A and guidance to flex forward   Sit to Stand CARE Score 2   Bed-Chair Transfer   Type of Assistance Needed Physical assistance   Physical Assistance Level 51%-75%   Comment stand pivot or sit pivot-  tends to not move LLE with proper foot placement   Chair/Bed-to-Chair Transfer CARE Score 2   Walk 10 Feet   Type of Assistance Needed Physical assistance   Physical Assistance Level 26%-50%   Comment Mod A with HHA   Walk 10 Feet CARE Score 3   Walk 50 Feet with Two Turns   Type of Assistance Needed Physical assistance   Physical Assistance Level Total assistance   Comment Mod A with chair follow   Walk 50 Feet with Two Turns CARE Score 1   Walk 150 Feet   Type of Assistance Needed Physical assistance   Physical Assistance Level Total assistance   Comment HHA with chair follow   Walk 150 Feet CARE Score 1   Ambulation   Does the patient walk? 2   Yes   Primary Mode of Locomotion Prior to Admission Walk   Distance Walked (feet) 150 ft  (150, 125 HHA,   50+25 with R HR,   50+ 125 with RW)   Assist Device Hand Hold;Roller Walker   Findings uses L AFO,  unequal step lengths but cues for L foot clear the ground and larger step,  Much less episode of dragging L foot today showing signs of progress -  times for faster pace therapist pushing walker-  Also needs some manual wt shift to right for improved L foot clearance   Stairs   Findings Performed at steps L toe tap on 4inch  (needed 2nd person to [facility] to get on step , then pts ext tone kicked in and could not lift foot off step perforemd 2 sets to fatigue,  then performed R foot tap to 6inc box 2 sets to fatigue (with fatigue much tremor like activity in LLE)   Equipment Use   ClaimReturnep for neuro prime 5min x2   Assessment   Treatment Assessment 90  min session performed transfers, multiple bouts of amb with HHA, and RW and HR, focused on Wt shift and single limb stability at  steps  Pts dec motor planning of LLE and extensor tone greatly impacts function  Pt also fatigues quickly which most portions of session used WC follow  Pt extensor tone creates much difficulty for hip flexors on L to work  Cont skilled PT toward LTGs   PT Barriers   Physical Impairment Decreased strength;Decreased range of motion;Decreased endurance; Impaired balance;Decreased mobility; Decreased coordination   Functional Limitation Car transfers;Stair negotiation;Standing;Transfers; Walking; Wheelchair management   Plan   Progress Progressing toward goals   PT Therapy Minutes   PT Time In 0830   PT Time Out 1000   PT Total Time (minutes) 90   PT Mode of treatment - Individual (minutes) 90   PT Mode of treatment - Concurrent (minutes) 0   PT Mode of treatment - Group (minutes) 0   PT Mode of treatment - Co-treat (minutes) 0   PT Mode of Treatment - Total time(minutes) 90 minutes   PT Cumulative Minutes 1285   Therapy Time missed   Time missed?  No

## 2022-09-03 NOTE — PROGRESS NOTES
Internal Medicine Progress Note  Patient: Homa Whitt  Age/sex: 80 y o  female  Medical Record #: [id number]      ASSESSMENT/PLAN: (Interval History)  Homa Whitt is seen and examined and management for following issues:    Acute embolic right MCA CVA  · Had near occlusion of right MCA  · Felt to be a Xarelto failure and was switched to Pradaxa  · ECHO w/o thrombus  · Continue statin  · On Baclofen for tone left arm = much improved     Chronic atrial fibrillation  · Sees Dr Veronica Sanchez as OP  · Rates controlled  · Cont Pradaxa     PPM  · VVI  · Is not MRI conditional  · 100% paced on EKG     Valvular disease  · Current ECHO:  LVEF 65%, mod AR/mild AS, mod TR with severely elevated RV systolic pressure, mild-mod MR, mod LAE/mild NEO  · Euvolemic currently     HTN  · Home:  Verapamil 240mg qhs/Cozaar 744 mg qd/Bystolic 5mg qd/lasix 47QB qd/Aldactone 25mg qd  · Here:  Verapamil 240mg qhs/Cozaar 747MD qd/Bystolic 5mg qd  · OP note states has left RA stenosis but no testing in OP records or Care Everywhere to verify  · stable     HLD  · Home:  Crestor 10mg qd = Neuro rec when discharged to reduce to 5mg qd  · Here:  Pravachol 40mg qd     DARRIUS  · S/p NS 1 liter   · Back to baseline  · Continue to hold Lasix and Aldactone for now     CAD  · Nonobstructive  · Card cath 8/2021 done for borderline abn stress test and found 50% mid RCA/40% mid LAD = no intervention done  · Continue statin  · Was not on ASA as an OP     Hypomagnesemia  · Cont 400 mg MagOx  · stable     Left chest pain  · Had left neck pain (not new for her) then pain under left breast = reproducible with palpation  · Cardiac w/u negative  · 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   · Dr Shalini Alvarado inc Baclofen to 5mg QID     · Has 5mg qhs prn as well       Cough  · CXR negative 8/25  · Dr Shalini Alvarado added Pepcid in case etio of cough is 2/2 GERD  · Has prn Robitussin and scheduled Tessalon perles 200mg TID  · Was on Flonase = stopped since not effective  · Has been on the Cozaar for quite a while she says so dont suspect this is the cause  · Thought to be coughing on saliva per ST but she did well otherwise with their eval and they did not change her diet  · Now I am told has grayish sputum with occ pinkish (previously clear) and pt says inc cough so will try tx'ing for tracheobronchitis with Zithromax 500mg x 1 then 250mg qd x 4     Urine retention  · Getting str cath'd  · Management per PMR  · Flomax added on 8/31/22  · Recent urinalysis +bacteria however started on zpack  · Await culture     Vaginal yeast infection  · Is receiving Monistat pack and gave 1 dose of Diflucan 150mg         DC planning: Reteam       The above assessment and plan was reviewed and updated as determined by my evaluation of the patient on 9/3/2022      Labs:   Results from last 7 days   Lab Units 09/02/22  1549 09/01/22  0654   WBC Thousand/uL 8 19 6 92   HEMOGLOBIN g/dL 11 3* 11 7   HEMATOCRIT % 34 6* 36 8   PLATELETS Thousands/uL 269 263     Results from last 7 days   Lab Units 09/02/22  1549 09/01/22  0654   SODIUM mmol/L 134* 136   POTASSIUM mmol/L 4 0 4 3   CHLORIDE mmol/L 106 106   CO2 mmol/L 22 26   BUN mg/dL 26* 28*   CREATININE mg/dL 1 14 1 04   CALCIUM mg/dL 9 2 9 1                   Review of Scheduled Meds:  Current Facility-Administered Medications   Medication Dose Route Frequency Provider Last Rate    acetaminophen  650 mg Oral Q6H PRN Cristina Briggs MD      azithromycin  250 mg Oral Q24H TOMY Levine      baclofen  5 mg Oral 4x Daily Gudelia Headley MD      benzonatate  200 mg Oral TID TOMY Russell      bisacodyl  10 mg Rectal Daily PRN Cristina Briggs MD      dabigatran etexilate  150 mg Oral Q12H [facility] Cristina Briggs MD      famotidine  20 mg Oral Daily Gudelia Headley MD      guaiFENesin  600 mg Oral Q12H [facility] TOMY Levine      losartan  100 mg Oral Daily Cristina Briggs MD      magnesium oxide  400 mg Oral Daily Cristina Briggs MD  melatonin  3 mg Oral QPM Reny Correa MD      menthol-methyl salicylate   Apply externally BID Reny Correa MD      miconazole   Topical BID PRN Teofilo Counts, CRNP      nebivolol  5 mg Oral Daily Aleah Sanford MD      nitroglycerin  0 4 mg Sublingual Q5 Min PRN Ardine  TOMY Piper      phenol  1 spray Mouth/Throat Q2H PRN Reny Correa MD      polyethylene glycol  17 g Oral Daily PRN Aleah Sanford MD      pravastatin  40 mg Oral Daily With Nathalie Hernandez MD      tamsulosin  0 4 mg Oral Daily With Gaurang Barnes MD      verapamil  240 mg Oral HS Aleah Sanford MD         Subjective/ HPI: Patient seen and examined  Patients overnight issues or events were reviewed with nursing or staff during rounds or morning huddle session  New or overnight issues include the following:     Pt with ongoing cough and saliva production  Encouraged her to keep hydrated, recently had chloraseptic spray which may be contributing to the pink tinged secretions  Her lungs are perfectly clear  She denies any urinary symptoms at present  ROS:   A 10 point ROS was performed; negative except as noted above       *Labs /Radiology studies reviewed  *Medications reviewed and reconciled as needed  *Please refer to order section for additional ordered labs studies  *Case discussed with primary attending during morning huddle case rounds    Physical Examination:  Vitals:   Vitals:    09/02/22 0542 09/02/22 1404 09/02/22 2112 09/03/22 0500   BP: 102/55 141/78 120/58 114/59   BP Location: Left arm Left arm Right arm Right arm   Pulse: 60 98 73 60   Resp: 18 18 16 16   Temp: 97 8 °F (36 6 °C) 97 7 °F (36 5 °C) 98 1 °F (36 7 °C) 97 8 °F (36 6 °C)   TempSrc: Oral Oral Oral Oral   SpO2: 97% 97% 93%    Weight:       Height:           GEN: No apparent distress, interactive  NEURO: Alert and oriented x3  HEENT: Pupils are equal and reactive, EOMI, mucous membranes are moist, face symmetrical  CV: S1 S2 regular, no MRG, no peripheral edema noted  RESP: Lungs are clear bilaterally, no wheezes, rales or rhonchi noted, on room air, respirations easy and non labored; +cough moist  GI: Flat, soft non tender, non distended; +BS x4  : Voiding without difficulty  MUSC: Moves all extremities; mild left hemiparesis  SKIN: pink, warm and dry, normal turgor, no rashes, lesions      The above physical exam was reviewed and updated as determined by my evaluation of the patient on 9/3/2022  Invasive Devices  Report    None                    VTE Pharmacologic Prophylaxis: Pradaxa  Code Status: Level 1 - Full Code  Current Length of Stay: 15 day(s)      Total time spent:  30 minutes with more than 50% spent counseling/coordinating care  Counseling includes discussion with patient re: progress  and discussion with patient of his/her current medical state/information  Coordination of patient's care was performed in conjunction with primary service  Time invested included review of patient's labs, vitals, and management of their comorbidities with continued monitoring  In addition, this patient was discussed with medical team including physician and advanced extenders  The care of the patient was extensively discussed and appropriate treatment plan was formulated unique for this patient  ** Please Note:  voice to text software may have been used in the creation of this document   Although proof errors in transcription or interpretation are a potential of such software**

## 2022-09-03 NOTE — ASSESSMENT & PLAN NOTE
Improving  PRN tessalon perles at d/c   VBS 9/6   - Per SLP - swallow looked good, no aspiration > continue Regular diet   - Patient stated things come up sometimes (but not seen on VBS) - when they panned down esophagus very small amounts of liquid retropulsed a little but then cleared down stomach   Spits out at times and feels like something in throat; intermittent atypical coughing at times > improving   Lung exam remains clear  Saturating well on RA, patient afebrile, without leukocytosis  - Lung exam fairly clear  - CXR 8/25 negative   - PPI for GERD (patient had been on H2 blocker not PPI and we switched today to see if that could help)   - Comgmt with internal medicine   - Nystatin per IM for possible mild thrush on tongue and risk of esophageal involvement - d/c'd now   - Low threshold to repeat CXR, risk of aspiration but on regular diet per SLP still - IM feels not needed and lung exam unremarkable on my eval  - Did have some consideration for tracheobronchitis earlier and she had 3 days of Azithromycin but IM does not feel this is etiology   - If needed OP GI follow-up

## 2022-09-03 NOTE — PROGRESS NOTES
09/03/22 1030   Pain Assessment   Pain Assessment Tool 0-10   Pain Score No Pain   Restrictions/Precautions   Precautions Bed/chair alarms;Cognitive; Fall Risk;Supervision on toilet/commode   Braces or Orthoses AFO  (LLE)   Lifestyle   Autonomy "I need to spit"   Sit to Stand   Type of Assistance Needed Physical assistance   Physical Assistance Level 76% or more   Comment Multiple attemtps t/o session ranging from Juan Pablo-MaxA depeding on what pt is able to hold onto and pt's chosen technique  Most attempts pt requries basic cues for hand and foot placement   Sit to Stand CARE Score 2   Neuromuscular Education   Comments NMR for LUE seated and while in stance at elevated table top  Pt first completes peg board task while seated w/ LUE and demos mild GMC/FMC for task  Repeated task in stance pt requires Juan Pablo in stance for balance and uses RUE for support on table top while completing task w/ LUE  W/ increased challenge of completing task in stance pt demos inc difficulty w/ motor planning and overall coordination  Intermittent retropulsion when in stance  Pt limited in standing trials d/t her reported need to sit down to spit into emesis basin and wipe mouth  Cognition   Overall Cognitive Status Impaired   Arousal/Participation Alert   Attention Attends with cues to redirect   Memory Decreased short term memory   Following Commands Follows one step commands with increased time or repetition   Comments Pt perseverates on spitting into emesis basin repeatedly, cues to redirect often unsuccessful  Activity Tolerance   Activity Tolerance Patient limited by fatigue   Assessment   Treatment Assessment OT session focusing on NMR and fxnl activity  Pt perseverates during session on her reported need to spit into an emesis basin and repeatedly wipe her mouth  Pt requires more hands on assist when completing tasks in stance as she continues to demo retropulsion and consistently required cueing to WB through LLE   Pt continues to require skilled hands on assist in order to maintain her safety  OT to continue to treat and follow established POC  Prognosis Fair   Problem List Decreased strength;Decreased range of motion;Decreased endurance; Impaired balance;Decreased mobility; Decreased coordination;Decreased cognition; Impaired judgement;Decreased safety awareness; Impaired tone   Plan   Treatment/Interventions ADL retraining;Functional transfer training; Therapeutic exercise; Endurance training;Cognitive reorientation;Patient/family training;Equipment eval/education; Compensatory technique education;Continued evaluation;Spoke to nursing   Progress Progressing toward goals   OT Therapy Minutes   OT Time In 1030   OT Time Out 1130   OT Total Time (minutes) 60   OT Mode of treatment - Individual (minutes) 60   OT Mode of treatment - Concurrent (minutes) 0   OT Mode of treatment - Group (minutes) 0   OT Mode of treatment - Co-treat (minutes) 0   OT Mode of Treatment - Total time(minutes) 60 minutes   OT Cumulative Minutes 1150   Therapy Time missed   Time missed?  No

## 2022-09-04 PROCEDURE — 99232 SBSQ HOSP IP/OBS MODERATE 35: CPT | Performed by: STUDENT IN AN ORGANIZED HEALTH CARE EDUCATION/TRAINING PROGRAM

## 2022-09-04 PROCEDURE — 97112 NEUROMUSCULAR REEDUCATION: CPT

## 2022-09-04 PROCEDURE — 97116 GAIT TRAINING THERAPY: CPT

## 2022-09-04 PROCEDURE — 99232 SBSQ HOSP IP/OBS MODERATE 35: CPT | Performed by: INTERNAL MEDICINE

## 2022-09-04 PROCEDURE — 97530 THERAPEUTIC ACTIVITIES: CPT

## 2022-09-04 PROCEDURE — 97535 SELF CARE MNGMENT TRAINING: CPT

## 2022-09-04 RX ORDER — LANOLIN ALCOHOL/MO/W.PET/CERES
6 CREAM (GRAM) TOPICAL EVERY EVENING
Status: DISCONTINUED | OUTPATIENT
Start: 2022-09-04 | End: 2022-09-13 | Stop reason: HOSPADM

## 2022-09-04 RX ADMIN — BENZONATATE 200 MG: 100 CAPSULE ORAL at 21:00

## 2022-09-04 RX ADMIN — LOSARTAN POTASSIUM 100 MG: 50 TABLET, FILM COATED ORAL at 07:27

## 2022-09-04 RX ADMIN — BACLOFEN 5 MG: 10 TABLET ORAL at 18:12

## 2022-09-04 RX ADMIN — BENZONATATE 200 MG: 100 CAPSULE ORAL at 07:27

## 2022-09-04 RX ADMIN — DABIGATRAN ETEXILATE MESYLATE 150 MG: 150 CAPSULE ORAL at 21:00

## 2022-09-04 RX ADMIN — AZITHROMYCIN MONOHYDRATE 250 MG: 250 TABLET ORAL at 13:28

## 2022-09-04 RX ADMIN — BENZONATATE 200 MG: 100 CAPSULE ORAL at 16:46

## 2022-09-04 RX ADMIN — BACLOFEN 5 MG: 10 TABLET ORAL at 21:00

## 2022-09-04 RX ADMIN — MENTHOL, METHYL SALICYLATE 1 APPLICATION: 10; 15 CREAM TOPICAL at 07:34

## 2022-09-04 RX ADMIN — NEBIVOLOL 5 MG: 5 TABLET ORAL at 07:31

## 2022-09-04 RX ADMIN — VERAPAMIL HYDROCHLORIDE 240 MG: 240 TABLET ORAL at 07:30

## 2022-09-04 RX ADMIN — GUAIFENESIN 600 MG: 600 TABLET, EXTENDED RELEASE ORAL at 21:00

## 2022-09-04 RX ADMIN — PRAVASTATIN SODIUM 40 MG: 40 TABLET ORAL at 16:46

## 2022-09-04 RX ADMIN — BACLOFEN 5 MG: 10 TABLET ORAL at 07:28

## 2022-09-04 RX ADMIN — TAMSULOSIN HYDROCHLORIDE 0.4 MG: 0.4 CAPSULE ORAL at 16:46

## 2022-09-04 RX ADMIN — DABIGATRAN ETEXILATE MESYLATE 150 MG: 150 CAPSULE ORAL at 07:27

## 2022-09-04 RX ADMIN — Medication 6 MG: at 21:00

## 2022-09-04 RX ADMIN — MENTHOL, METHYL SALICYLATE 1 APPLICATION: 10; 15 CREAM TOPICAL at 18:12

## 2022-09-04 RX ADMIN — GUAIFENESIN 600 MG: 600 TABLET, EXTENDED RELEASE ORAL at 07:29

## 2022-09-04 RX ADMIN — MAGNESIUM OXIDE TAB 400 MG (241.3 MG ELEMENTAL MG) 400 MG: 400 (241.3 MG) TAB at 07:29

## 2022-09-04 RX ADMIN — BACLOFEN 5 MG: 10 TABLET ORAL at 11:22

## 2022-09-04 RX ADMIN — FAMOTIDINE 20 MG: 20 TABLET, FILM COATED ORAL at 07:30

## 2022-09-04 NOTE — PLAN OF CARE
Problem: Potential for Falls  Goal: Patient will remain free of falls  Description: INTERVENTIONS:  - Educate patient/family on patient safety including physical limitations  - Instruct patient to call for assistance with activity   - Consult OT/PT to assist with strengthening/mobility   - Keep Call bell within reach  - Keep bed low and locked with side rails adjusted as appropriate  - Keep care items and personal belongings within reach  - Initiate and maintain comfort rounds  - Make Fall Risk Sign visible to staff  - Offer Toileting every 2-4 Hours, in advance of need  - Initiate/Maintain bed/chair alarm  - Obtain necessary fall risk management equipment: nonskid footwear  - Apply yellow socks and bracelet for high fall risk patients  - Consider moving patient to room near nurses station  Outcome: Progressing

## 2022-09-04 NOTE — PLAN OF CARE
Problem: MOBILITY - ADULT  Goal: Maintain or return to baseline ADL function  Description: INTERVENTIONS:  -  Assess patient's ability to carry out ADLs; assess patient's baseline for ADL function and identify physical deficits which impact ability to perform ADLs (bathing, care of mouth/teeth, toileting, grooming, dressing, etc )  - Assess/evaluate cause of self-care deficits   - Assess range of motion  - Assess patient's mobility; develop plan if impaired  - Assess patient's need for assistive devices and provide as appropriate  - Encourage maximum independence but intervene and supervise when necessary  - Involve family in performance of ADLs  - Assess for home care needs following discharge   - Consider OT consult to assist with ADL evaluation and planning for discharge  - Provide patient education as appropriate  Outcome: Progressing  Goal: Maintains/Returns to pre admission functional level  Description: INTERVENTIONS:  - Set and communicate daily mobility goal to care team and patient/family/caregiver  - Collaborate with rehabilitation services on mobility goals if consulted  - Perform Range of Motion 3 times a day  - Reposition patient every 2 hours    - Dangle patient 3 times a day  - Stand patient 3 times a day  - Ambulate patient 3 times a day  - Out of bed to chair 3 times a day   - Out of bed for meals 3 times a day  - Out of bed for toileting  - Record patient progress and toleration of activity level   Outcome: Progressing     Problem: Prexisting or High Potential for Compromised Skin Integrity  Goal: Skin integrity is maintained or improved  Description: INTERVENTIONS:  - Identify patients at risk for skin breakdown  - Assess and monitor skin integrity  - Assess and monitor nutrition and hydration status  - Monitor labs   - Assess for incontinence   - Turn and reposition patient  - Assist with mobility/ambulation  - Relieve pressure over bony prominences  - Avoid friction and shearing  - Provide appropriate hygiene as needed including keeping skin clean and dry  - Evaluate need for skin moisturizer/barrier cream  - Collaborate with interdisciplinary team   - Patient/family teaching  - Consider wound care consult   Outcome: Progressing     Problem: Potential for Falls  Goal: Patient will remain free of falls  Description: INTERVENTIONS:  - Educate patient/family on patient safety including physical limitations  - Instruct patient to call for assistance with activity   - Consult OT/PT to assist with strengthening/mobility   - Keep Call bell within reach  - Keep bed low and locked with side rails adjusted as appropriate  - Keep care items and personal belongings within reach  - Initiate and maintain comfort rounds  - Make Fall Risk Sign visible to staff  - Offer Toileting every 2-4 Hours, in advance of need  - Initiate/Maintain bed/chair alarm  - Obtain necessary fall risk management equipment: nonskid footwear  - Apply yellow socks and bracelet for high fall risk patients  - Consider moving patient to room near nurses station  Outcome: Progressing     Problem: PAIN - ADULT  Goal: Verbalizes/displays adequate comfort level or baseline comfort level  Description: Interventions:  - Encourage patient to monitor pain and request assistance  - Assess pain using appropriate pain scale  - Administer analgesics based on type and severity of pain and evaluate response  - Implement non-pharmacological measures as appropriate and evaluate response  - Consider cultural and social influences on pain and pain management  - Notify physician/advanced practitioner if interventions unsuccessful or patient reports new pain  Outcome: Progressing     Problem: INFECTION - ADULT  Goal: Absence or prevention of progression during hospitalization  Description: INTERVENTIONS:  - Assess and monitor for signs and symptoms of infection  - Monitor lab/diagnostic results  - Monitor all insertion sites, i e  indwelling lines, tubes, and drains  - Monitor endotracheal if appropriate and nasal secretions for changes in amount and color  - [place] appropriate cooling/warming therapies per order  - Administer medications as ordered  - Instruct and encourage patient and family to use good hand hygiene technique  - Identify and instruct in appropriate isolation precautions for identified infection/condition  Outcome: Progressing     Problem: DISCHARGE PLANNING  Goal: Discharge to home or other facility with appropriate resources  Description: INTERVENTIONS:  - Identify barriers to discharge w/patient and caregiver  - Arrange for needed discharge resources and transportation as appropriate  - Identify discharge learning needs (meds, wound care, etc )  - Arrange for interpretive services to assist at discharge as needed  - Refer to Case Management Department for coordinating discharge planning if the patient needs post-hospital services based on physician/advanced practitioner order or complex needs related to functional status, cognitive ability, or social support system  Outcome: Progressing     Problem: Nutrition/Hydration-ADULT  Goal: Nutrient/Hydration intake appropriate for improving, restoring or maintaining nutritional needs  Description: Monitor and assess patient's nutrition/hydration status for malnutrition  Collaborate with interdisciplinary team and initiate plan and interventions as ordered  Monitor patient's weight and dietary intake as ordered or per policy  Utilize nutrition screening tool and intervene as necessary  Determine patient's food preferences and provide high-protein, high-caloric foods as appropriate       INTERVENTIONS:  - Monitor oral intake, urinary output, labs, and treatment plans  - Assess nutrition and hydration status and recommend course of action  - Evaluate amount of meals eaten  - Assist patient with eating if necessary   - Allow adequate time for meals  - Recommend/ encourage appropriate diets, oral nutritional supplements, and vitamin/mineral supplements  - Order, calculate, and assess calorie counts as needed  - Recommend, monitor, and adjust tube feedings and TPN/PPN based on assessed needs  - Assess need for intravenous fluids  - Provide specific nutrition/hydration education as appropriate  - Include patient/family/caregiver in decisions related to nutrition  Outcome: Progressing

## 2022-09-04 NOTE — PROGRESS NOTES
09/04/22 0830   Pain Assessment   Pain Assessment Tool 0-10   Pain Score 6   Pain Location/Orientation Location: Neck   Restrictions/Precautions   Precautions Bed/chair alarms;Cognitive; Fall Risk;Supervision on toilet/commode   Braces or Orthoses AFO   Lifestyle   Autonomy pt agreeable to session  Noted less of a need to spit t/o session  Of note: pt perseverating on this yesterday  Oral Hygiene   Type of Assistance Needed Set-up / clean-up   Physical Assistance Level No physical assistance   Comment seated at [facility] in sink  Mild coordination impairments on LUE but pt able to incoporate use of BUE for task appropriately  Oral Hygiene CARE Score 5   Shower/Bathe Self   Type of Assistance Needed Physical assistance   Physical Assistance Level 26%-50%   Comment shower completed from primarily seated position  Seated pt able to wash UB and LB down to feet w/ CGA, although Tan for L foot  In amber pt requries Tan for balance while washing buttocks  Shower/Bathe Self CARE Score 3   Bathing   Assessed Bath Style Shower   Tub/Shower Transfer   Findings ModA SPT WC > tub bench using grab bars  Cues for technique and safety requried  Upper Body Dressing   Type of Assistance Needed Physical assistance   Physical Assistance Level 25% or less   Comment Tan to pull over L shoulder and down trunk  Upper Body Dressing CARE Score 3   Lower Body Dressing   Type of Assistance Needed Physical assistance   Physical Assistance Level 51%-75%   Comment seated to don to thigh height w/ assist to thread LLE  In stance balance assist provided while pt completes CM over hips w/ cues and extra time  Lower Body Dressing CARE Score 2   Putting On/Taking Off Footwear   Type of Assistance Needed Physical assistance   Physical Assistance Level 51%-75%   Comment Pt able to don/doff RLE  Assist for LLE     Putting On/Taking Off Footwear CARE Score 2   Sit to Stand   Type of Assistance Needed Physical assistance   Physical Assistance Level 26%-50%   Comment min-ModA w/ cues   Sit to Stand CARE Score 3   Bed-Chair Transfer   Type of Assistance Needed Physical assistance   Physical Assistance Level 51%-75%   Comment stand pivot to L side   Chair/Bed-to-Chair Transfer CARE Score 2   Cognition   Overall Cognitive Status Impaired   Arousal/Participation Alert; Cooperative   Attention Attends with cues to redirect   Orientation Level Oriented X4   Memory Decreased short term memory   Following Commands Follows one step commands with increased time or repetition   Comments much less perseverating today compared to yesterday   Activity Tolerance   Activity Tolerance Patient tolerated treatment well   Assessment   Treatment Assessment OT session focusing on ADL completion including shower routine  Pt overall Tan from shower but must complete majority of shower from seated position to maximize IND and for safety considerations  Pt remains especially limited w/ dressing tasks requiring MaxA for LB dressing and for footwear  Overall, pt more conversational today and perseverating less on wanting to spit and wipe her face, therefore greater participation today  OT to continue to treat and follow established POC  REC NO USE OF BEDPAN pt is safe to transfer AX2 w/ nursing staff to a commode ModAx2 and cues for hand placement  Problem List Decreased strength;Decreased range of motion;Decreased endurance; Impaired balance;Decreased mobility; Decreased coordination;Decreased cognition; Impaired judgement;Decreased safety awareness; Impaired tone   Plan   Treatment/Interventions ADL retraining;Functional transfer training; Therapeutic exercise; Endurance training;Cognitive reorientation;Patient/family training   OT Therapy Minutes   OT Time In 0830   OT Time Out 1000   OT Total Time (minutes) 90   OT Mode of treatment - Individual (minutes) 90   OT Mode of treatment - Concurrent (minutes) 0   OT Mode of treatment - Group (minutes) 0   OT Mode of treatment - Co-treat (minutes) 0   OT Mode of Treatment - Total time(minutes) 90 minutes   OT Cumulative Minutes 1270   Therapy Time missed   Time missed?  No

## 2022-09-04 NOTE — PROGRESS NOTES
PM&R Coverage Progress Note:    Rehab Diagnosis: Stroke:  01 1  Left Body Involvement (Right Brain)  Etiologic Diagnosis:  Right MCA distribution ischemic infarction       ASSESSMENT: Stable      PLAN:    Rehabilitation   Continue current rehabilitation plan of care to maximize function   Voiding Q4H program per primary team, encouraged staff and patient to not attempt bed pan voiding unless necessary  Medical issues   Still pending urine culture, currently on antibiotic treatment with azithromycin, keep looking out for sensitivities and adjust abx as indicated   Continue current medical plan of care  Appreciate IM consultants co-management  SUBJECTIVE: Patient seen face to face  No acute issues  Progressing as expected in rehabilitation program  Seems to have some more symptoms at night per nursing, but generally appropriate during most of the day  Patient denies fever, chills, nausea, emesis, shortness of breath, diarrhea, or constipation  Sleep was fine, mood stable  Still with cough and spitting out clear saliva rather than swallow it  Not observed coughing during my visit  ROS:  A ten point review of systems was completed on 09/04/22 and pertinent positives are listed in subjective section  All other systems reviewed were negative  OBJECTIVE:   BP (!) 90/42 (BP Location: Right arm) Comment: not symptomatic  Pulse 60   Temp (!) 97 4 °F (36 3 °C) (Oral)   Resp 18   Ht 5' 3" (1 6 m)   Wt 64 3 kg (141 lb 12 1 oz)   SpO2 100%   BMI 25 11 kg/m²     Physical Exam  Vitals and nursing note reviewed  Constitutional:       General: She is not in acute distress  HENT:      Head: Normocephalic and atraumatic  Eyes:      Conjunctiva/sclera: Conjunctivae normal    Cardiovascular:      Rate and Rhythm: Normal rate  Pulses: Normal pulses  Pulmonary:      Effort: Pulmonary effort is normal  No respiratory distress  Abdominal:      General: There is no distension  Palpations: Abdomen is soft  Musculoskeletal:      Cervical back: Neck supple  Right lower leg: No edema  Left lower leg: No edema  Skin:     General: Skin is warm  Neurological:      Mental Status: She is alert and oriented to person, place, and time  Motor: Weakness present     Psychiatric:         Mood and Affect: Mood normal          Behavior: Behavior normal           Lab Results   Component Value Date    WBC 8 19 09/02/2022    HGB 11 3 (L) 09/02/2022    HCT 34 6 (L) 09/02/2022    MCV 93 09/02/2022     09/02/2022     Lab Results   Component Value Date    SODIUM 134 (L) 09/02/2022    K 4 0 09/02/2022     09/02/2022    CO2 22 09/02/2022    BUN 26 (H) 09/02/2022    CREATININE 1 14 09/02/2022    GLUC 135 09/02/2022    CALCIUM 9 2 09/02/2022     Lab Results   Component Value Date    INR 1 57 (H) 08/12/2022    INR 1 86 (H) 08/12/2022    INR 2 60 (H) 10/23/2018    PROTIME 19 0 (H) 08/12/2022    PROTIME 21 7 (H) 08/12/2022    PROTIME 27 9 (H) 10/23/2018           Current Facility-Administered Medications:     acetaminophen (TYLENOL) tablet 650 mg, 650 mg, Oral, Q6H PRN, Jason Forte MD, 650 mg at 09/03/22 2112    [COMPLETED] azithromycin (ZITHROMAX) tablet 500 mg, 500 mg, Oral, Q24H, 500 mg at 09/02/22 1803 **FOLLOWED BY** azithromycin (ZITHROMAX) tablet 250 mg, 250 mg, Oral, Q24H, TOMY Perry, 250 mg at 09/04/22 1328    baclofen tablet 5 mg, 5 mg, Oral, 4x Daily, Rich Soto MD, 5 mg at 09/04/22 1122    benzonatate (TESSALON PERLES) capsule 200 mg, 200 mg, Oral, TID, TOMY Perry, 200 mg at 09/04/22 5230    bisacodyl (DULCOLAX) rectal suppository 10 mg, 10 mg, Rectal, Daily PRN, Jason Forte MD, 10 mg at 09/03/22 1818    dabigatran etexilate (PRADAXA) capsule 150 mg, 150 mg, Oral, Q12H [facility], Jason Forte MD, 150 mg at 09/04/22 0705    famotidine (PEPCID) tablet 20 mg, 20 mg, Oral, Daily, Rich Soto MD, 20 mg at 09/04/22 0730   guaiFENesin (MUCINEX) 12 hr tablet 600 mg, 600 mg, Oral, Q12H [facility], Rossyneyda TOMY Capps, 600 mg at 09/04/22 0729    losartan (COZAAR) tablet 100 mg, 100 mg, Oral, Daily, Federica Galdamez MD, 100 mg at 09/04/22 0466    magnesium oxide (MAG-OX) tablet 400 mg, 400 mg, Oral, Daily, Federica Galdamez MD, 400 mg at 09/04/22 0729    melatonin tablet 6 mg, 6 mg, Oral, QPM, Edna Rider DO    menthol-methyl salicylate (BENGAY) 39-36 % cream, , Apply externally, BID, Avel Leslie MD, 1 application at 04/26/90 0734    nebivolol (BYSTOLIC) tablet 5 mg, 5 mg, Oral, Daily, Federica Galdamez MD, 5 mg at 09/04/22 0731    nitroglycerin (NITROSTAT) SL tablet 0 4 mg, 0 4 mg, Sublingual, Q5 Min PRN, TOMY Staples    phenol (CHLORASEPTIC) 1 4 % mucosal liquid 1 spray, 1 spray, Mouth/Throat, Q2H PRN, Avel Leslie MD, 1 spray at 09/03/22 1515    polyethylene glycol (MIRALAX) packet 17 g, 17 g, Oral, Daily PRN, Federica Galdamez MD, 17 g at 08/31/22 0705    pravastatin (PRAVACHOL) tablet 40 mg, 40 mg, Oral, Daily With Dale Abdullahi MD, 40 mg at 09/03/22 1628    tamsulosin (FLOMAX) capsule 0 4 mg, 0 4 mg, Oral, Daily With Jose Torres MD, 0 4 mg at 09/03/22 1628    verapamil (CALAN-SR) CR tablet 240 mg, 240 mg, Oral, HS, Federica Galdamez MD, 240 mg at 09/04/22 0730    Past Medical History:   Diagnosis Date    A-fib ([facility] [address] )     Anemia     Arthritis     Breast pain, right     Chest pain on breathing     Colon polyp     Cough     Diverticulosis     Encounter for screening colonoscopy     H/O sick sinus syndrome     Heart murmur     High cholesterol     History of atrial fibrillation     Rate ontrolled  On xarelto 15mg (creatinine 50) Followed by Dr Rose Aguilera with Cardiology     History of weight loss     Report loose fitting clothes  Weight stable (3lbs difference)  Last colo showed small tubular adenoma x2  Breast biopsy last showed fibrocystic changes  TSH wnl  Will check cbc, bmp, spep   Hx of long term use of blood thinners     Hypertension     Irregular heart beat     Pacemaker     Preop examination     Shortness of breath     Viral bronchitis        Patient Active Problem List    Diagnosis Date Noted    Cough 09/03/2022    Valvular heart disease 08/20/2022    CAD (coronary artery disease) 08/20/2022    Urinary retention 08/20/2022    Neck pain 08/20/2022    Healthcare maintenance 08/20/2022    At risk for coping difficulty 08/20/2022    Anemia 08/19/2022    At risk for venous thromboembolism (VTE) 08/19/2022    DARRIUS (acute kidney injury) ([address] ) 08/19/2022    Spastic hemiparesis of left dominant side as late effect of cerebral infarction ([address] ) 08/19/2022    Arterial ischemic stroke, MCA (middle cerebral artery), right, acute ([address] ) 08/12/2022    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 ([facility] [address] ) 08/12/2022    Renal insufficiency     Abnormal nuclear stress test 08/20/2021    Diverticulosis of colon 05/20/2019    Pacemaker 02/26/2018    Tubulovillous adenoma 02/09/2018    Gastroesophageal reflux disease without esophagitis 02/09/2018    Other chest pain 12/08/2017    Essential hypertension 12/08/2017    Hyperlipidemia LDL goal <100 12/08/2017    Stage 3 chronic kidney disease ([address] ) 12/08/2017    Osteoarthritis of both wrists 12/08/2017    Chronic atrial fibrillation ([address] ) 12/08/2017    Cavus deformity of foot 06/27/2017    Hallux abducto valgus, bilateral 06/27/2017    Lipoma of right upper extremity 05/17/2017    Pain due to onychomycosis of toenails of both feet 01/20/2017    Allergic rhinitis due to pollen 10/12/2015    Midline cystocele 12/23/2014    Osteoarthritis of hip 07/17/2014    B12 deficiency 12/06/2013    Osteopenia 11/15/2013    Left renal artery stenosis ([address] ) 08/27/2013    Left atrial enlargement 08/27/2013        Yessica Sneed DO  Physical Medicine and [address]    Total time spent:  20 minutes with more than 50% spent counseling/coordinating care  Counseling includes discussion with patient re: progress and discussion with patient of his/her current medical/functional state/information  Coordination of patient's care was performed in conjunction with consulting services  Time invested included review of patient's labs, vitals, and management of their comorbidities with continued monitoring  The care of the patient was extensively discussed and appropriate treatment plan was formulated unique for this patient

## 2022-09-04 NOTE — PROGRESS NOTES
09/04/22 1400   Pain Assessment   Pain Assessment Tool 0-10   Pain Score No Pain   Restrictions/Precautions   Precautions Bed/chair alarms;Cognitive; Fall Risk;Supervision on toilet/commode   Braces or Orthoses AFO   Sit to Stand   Type of Assistance Needed Physical assistance   Physical Assistance Level 26%-50%   Comment Mod A -  needs forward flexion, cues for hand placement, and slight lift assist , this tendsd to fluctuate   Sit to Stand CARE Score 3   Bed-Chair Transfer   Type of Assistance Needed Physical assistance   Physical Assistance Level 26%-50%   Comment Min/ Mod A with Rw-  needs balance assist and times of help managing RW if it gets caught on chair , delayed motor planning of LLE   Chair/Bed-to-Chair Transfer CARE Score 3   Walk 10 Feet   Type of Assistance Needed Physical assistance   Physical Assistance Level 26%-50%   Comment Min/ Mod A with RW   Walk 10 Feet CARE Score 3   Walk 50 Feet with Two Turns   Type of Assistance Needed Physical assistance   Physical Assistance Level 26%-50%   Comment Min / Mod with RW   Walk 50 Feet with Two Turns CARE Score 3   Walk 150 Feet   Type of Assistance Needed Physical assistance   Physical Assistance Level Total assistance   Walk 150 Feet CARE Score 1   Ambulation   Does the patient walk? 2   Yes   Primary Mode of Locomotion Prior to Admission Walk   Distance Walked (feet) 125 ft  (125+50 with RW,  100x2 HHA on R,   200x2 bilat HHA fast pace)   Findings With RW pt very slow - flexed posutre, lacks foot clearance on L- improves with therapist pushing walker to produce faster pace (pt quickly fatigues),  Performed HHA with Mod A for balance and wt shift with chair follow,  Performed Bilat HHA x2 for longer distance and faster pace   Stairs   Type Stairs   Assist Devices Single Rail   Findings First was attempting L foot tap to bottom step  but pt unable to get L foot onto 4inch step without therapist lifting- pt instructed to perform steps and came very natural she was able to go up and over  steps 2 times using step to pattern - attempted step through for NPP but L will not lift that high   Therapeutic Interventions   Neuromuscular Re-Education 200x2 bilat HHA,  performed  steps , HHA amb as noted above   Equipment Use   NuStep 10 mins SPM 55 neuro prime   Assessment   Treatment Assessment 90 min session focused on amb / steps/ transfers  Pt showed some improvement with endurance today able to amb 200 feet at faster pace with bilat HHA  Pts LLE motor delay and tone still inhibits transfers and walking safety   Pt needs to perform acts that promote wt shift to the R (noted at the steps pt seemed to be fighting wt shift to the L when attempting to get foot up step  Due to pts deficits concerns on pts D/ C home due to safety and not close to independent level  PT Barriers   Physical Impairment Decreased strength;Decreased range of motion;Decreased endurance; Impaired balance;Decreased mobility; Decreased coordination   Functional Limitation Car transfers;Stair negotiation;Standing;Transfers; Walking; Wheelchair management   PT Therapy Minutes   PT Time In 1400   PT Time Out 1530   PT Total Time (minutes) 90   PT Mode of treatment - Individual (minutes) 90   PT Mode of treatment - Concurrent (minutes) 0   PT Mode of treatment - Group (minutes) 0   PT Mode of treatment - Co-treat (minutes) 0   PT Mode of Treatment - Total time(minutes) 90 minutes   PT Cumulative Minutes 1375   Therapy Time missed   Time missed?  No

## 2022-09-05 LAB
ANION GAP SERPL CALCULATED.3IONS-SCNC: 6 MMOL/L (ref 4–13)
BACTERIA UR CULT: ABNORMAL
BASOPHILS # BLD AUTO: 0.02 THOUSANDS/ΜL (ref 0–0.1)
BASOPHILS NFR BLD AUTO: 0 % (ref 0–1)
BUN SERPL-MCNC: 29 MG/DL (ref 5–25)
CALCIUM SERPL-MCNC: 9.2 MG/DL (ref 8.3–10.1)
CHLORIDE SERPL-SCNC: 107 MMOL/L (ref 96–108)
CO2 SERPL-SCNC: 24 MMOL/L (ref 21–32)
CREAT SERPL-MCNC: 1.12 MG/DL (ref 0.6–1.3)
EOSINOPHIL # BLD AUTO: 0.13 THOUSAND/ΜL (ref 0–0.61)
EOSINOPHIL NFR BLD AUTO: 2 % (ref 0–6)
ERYTHROCYTE [DISTWIDTH] IN BLOOD BY AUTOMATED COUNT: 14.1 % (ref 11.6–15.1)
GFR SERPL CREATININE-BSD FRML MDRD: 45 ML/MIN/1.73SQ M
GLUCOSE P FAST SERPL-MCNC: 72 MG/DL (ref 65–99)
GLUCOSE SERPL-MCNC: 72 MG/DL (ref 65–140)
HCT VFR BLD AUTO: 34.5 % (ref 34.8–46.1)
HGB BLD-MCNC: 10.7 G/DL (ref 11.5–15.4)
IMM GRANULOCYTES # BLD AUTO: 0.03 THOUSAND/UL (ref 0–0.2)
IMM GRANULOCYTES NFR BLD AUTO: 1 % (ref 0–2)
LYMPHOCYTES # BLD AUTO: 1.38 THOUSANDS/ΜL (ref 0.6–4.47)
LYMPHOCYTES NFR BLD AUTO: 24 % (ref 14–44)
MCH RBC QN AUTO: 29.8 PG (ref 26.8–34.3)
MCHC RBC AUTO-ENTMCNC: 31 G/DL (ref 31.4–37.4)
MCV RBC AUTO: 96 FL (ref 82–98)
MONOCYTES # BLD AUTO: 0.61 THOUSAND/ΜL (ref 0.17–1.22)
MONOCYTES NFR BLD AUTO: 10 % (ref 4–12)
NEUTROPHILS # BLD AUTO: 3.71 THOUSANDS/ΜL (ref 1.85–7.62)
NEUTS SEG NFR BLD AUTO: 63 % (ref 43–75)
NRBC BLD AUTO-RTO: 0 /100 WBCS
PLATELET # BLD AUTO: 227 THOUSANDS/UL (ref 149–390)
PMV BLD AUTO: 10.9 FL (ref 8.9–12.7)
POTASSIUM SERPL-SCNC: 4 MMOL/L (ref 3.5–5.3)
RBC # BLD AUTO: 3.59 MILLION/UL (ref 3.81–5.12)
SODIUM SERPL-SCNC: 137 MMOL/L (ref 135–147)
WBC # BLD AUTO: 5.88 THOUSAND/UL (ref 4.31–10.16)

## 2022-09-05 PROCEDURE — 99232 SBSQ HOSP IP/OBS MODERATE 35: CPT | Performed by: INTERNAL MEDICINE

## 2022-09-05 PROCEDURE — 99232 SBSQ HOSP IP/OBS MODERATE 35: CPT | Performed by: STUDENT IN AN ORGANIZED HEALTH CARE EDUCATION/TRAINING PROGRAM

## 2022-09-05 PROCEDURE — 85025 COMPLETE CBC W/AUTO DIFF WBC: CPT | Performed by: NURSE PRACTITIONER

## 2022-09-05 PROCEDURE — 80048 BASIC METABOLIC PNL TOTAL CA: CPT | Performed by: NURSE PRACTITIONER

## 2022-09-05 PROCEDURE — 92526 ORAL FUNCTION THERAPY: CPT

## 2022-09-05 RX ORDER — BACLOFEN 10 MG/1
5 TABLET ORAL 3 TIMES DAILY
Status: DISCONTINUED | OUTPATIENT
Start: 2022-09-05 | End: 2022-09-08

## 2022-09-05 RX ORDER — CEPHALEXIN 500 MG/1
500 CAPSULE ORAL EVERY 6 HOURS SCHEDULED
Status: COMPLETED | OUTPATIENT
Start: 2022-09-05 | End: 2022-09-12

## 2022-09-05 RX ORDER — LOSARTAN POTASSIUM 50 MG/1
50 TABLET ORAL DAILY
Status: DISCONTINUED | OUTPATIENT
Start: 2022-09-06 | End: 2022-09-13 | Stop reason: HOSPADM

## 2022-09-05 RX ADMIN — BENZONATATE 200 MG: 100 CAPSULE ORAL at 08:35

## 2022-09-05 RX ADMIN — BACLOFEN 5 MG: 10 TABLET ORAL at 21:20

## 2022-09-05 RX ADMIN — GUAIFENESIN 600 MG: 600 TABLET, EXTENDED RELEASE ORAL at 08:35

## 2022-09-05 RX ADMIN — DABIGATRAN ETEXILATE MESYLATE 150 MG: 150 CAPSULE ORAL at 08:34

## 2022-09-05 RX ADMIN — MENTHOL, METHYL SALICYLATE 1 APPLICATION: 10; 15 CREAM TOPICAL at 18:20

## 2022-09-05 RX ADMIN — MENTHOL, METHYL SALICYLATE: 10; 15 CREAM TOPICAL at 08:35

## 2022-09-05 RX ADMIN — BACLOFEN 5 MG: 10 TABLET ORAL at 08:35

## 2022-09-05 RX ADMIN — NEBIVOLOL 5 MG: 5 TABLET ORAL at 08:35

## 2022-09-05 RX ADMIN — LOSARTAN POTASSIUM 100 MG: 50 TABLET, FILM COATED ORAL at 08:35

## 2022-09-05 RX ADMIN — TAMSULOSIN HYDROCHLORIDE 0.4 MG: 0.4 CAPSULE ORAL at 16:09

## 2022-09-05 RX ADMIN — DABIGATRAN ETEXILATE MESYLATE 150 MG: 150 CAPSULE ORAL at 21:19

## 2022-09-05 RX ADMIN — PRAVASTATIN SODIUM 40 MG: 40 TABLET ORAL at 16:09

## 2022-09-05 RX ADMIN — BENZONATATE 200 MG: 100 CAPSULE ORAL at 21:20

## 2022-09-05 RX ADMIN — FAMOTIDINE 20 MG: 20 TABLET, FILM COATED ORAL at 08:34

## 2022-09-05 RX ADMIN — BENZONATATE 200 MG: 100 CAPSULE ORAL at 16:09

## 2022-09-05 RX ADMIN — VERAPAMIL HYDROCHLORIDE 240 MG: 240 TABLET ORAL at 21:19

## 2022-09-05 RX ADMIN — BACLOFEN 5 MG: 10 TABLET ORAL at 16:09

## 2022-09-05 RX ADMIN — GUAIFENESIN 600 MG: 600 TABLET, EXTENDED RELEASE ORAL at 21:20

## 2022-09-05 RX ADMIN — MAGNESIUM OXIDE TAB 400 MG (241.3 MG ELEMENTAL MG) 400 MG: 400 (241.3 MG) TAB at 08:34

## 2022-09-05 RX ADMIN — AZITHROMYCIN MONOHYDRATE 250 MG: 250 TABLET ORAL at 14:22

## 2022-09-05 RX ADMIN — CEPHALEXIN 500 MG: 500 CAPSULE ORAL at 18:20

## 2022-09-05 RX ADMIN — CEPHALEXIN 500 MG: 500 CAPSULE ORAL at 23:17

## 2022-09-05 RX ADMIN — Medication 6 MG: at 21:19

## 2022-09-05 NOTE — PROGRESS NOTES
Internal Medicine Progress Note  Patient: Baldemar Bird  Age/sex: 80 y o  female  Medical Record #: [id number]      ASSESSMENT/PLAN: (Interval History)  Baldemar Bird is seen and examined and management for following issues:    Acute embolic right MCA CVA  · Had near occlusion of right MCA  · Felt to be a Xarelto failure and was switched to Pradaxa  · ECHO w/o thrombus  · Continue statin  · On Baclofen for tone left arm = much improved but c/o inc tone left leg  · This AM, had c/o "saying that word again"  This occurred 9/2  She will repeat a particular word that is not really a word and she knows it  She speaks fluently about it at the same time  Her neuro exam is unchanged except for her c/o inc tone left leg    D/w Dr Melyssa Luis and he will see now     Chronic atrial fibrillation  · Sees Dr Sheng Ríos as OP  · Rates controlled  · Cont Pradaxa/Bystolic     PPM  · VVI  · Is not MRI conditional  · 100% paced on EKG     Valvular disease  · Current ECHO:  LVEF 65%, mod AR/mild AS, mod TR with severely elevated RV systolic pressure, mild-mod MR, mod LAE/mild NEO  · Euvolemic currently     HTN  · Home:  Verapamil 240mg qhs/Cozaar 285 mg qd/Bystolic 5mg qd/lasix 53BP qd/Aldactone 25mg qd  · Here:  Verapamil 240mg qhs/Cozaar 497JB qd/Bystolic 5mg qd  · OP note states has left RA stenosis but no testing in OP records or Care Everywhere to verify  · BP gets a bit soft at times so will reduce Cozaar to 50mg qd     HLD  · Home:  Crestor 10mg qd = Neuro rec when discharged to reduce to 5mg qd  · Here:  Pravachol 40mg qd     DARRIUS  · S/p NS 1 liter   · Back to baseline  · Continue to hold Lasix and Aldactone for now     CAD  · Nonobstructive  · Card cath 8/2021 done for borderline abn stress test and found 50% mid RCA/40% mid LAD = no intervention done  · Continue statin  · Was not on ASA as an OP     Hypomagnesemia  · Cont 400 mg MagOx  · stable     Left chest pain  · Had left neck pain (not new for her) then pain under left breast 8/31/22 = reproducible with palpation  · Cardiac w/u negative  · Etio was her left arm spasticity   · Baclofen 5mg QID and 5mg qhs prn       Cough   · CXR negative 8/25  · Has scheduled Tessalon perles 200mg TID/Chlorarseptic spray/pepcid/Mucinex  · Started z-pack 9/2 in case has tracheobronchitis  · Flonase was not effective  · Cozaar likely not the cause since has been on for a long time  · Says feels like something in throat and freq spits out her saliva = for barium swallow (per ST has no issues eating/drinking)  · no improvement --> D/W DR Vi Storm and ST --> for VBS     Urine retention  · Getting str cath'd  · Management per PMR  · Flomax added on 8/31/22  · Urine culture is >60,000-69,000 proteus = no sx     Vaginal yeast infection  · S/p Monistat pack/Diflucan 150mg x 1         DC planning: Reteam       The above assessment and plan was reviewed and updated as determined by my evaluation of the patient on 9/5/2022      Labs:   Results from last 7 days   Lab Units 09/05/22  0428 09/02/22  1549   WBC Thousand/uL 5 88 8 19   HEMOGLOBIN g/dL 10 7* 11 3*   HEMATOCRIT % 34 5* 34 6*   PLATELETS Thousands/uL 227 269     Results from last 7 days   Lab Units 09/05/22  0428 09/02/22  1549   SODIUM mmol/L 137 134*   POTASSIUM mmol/L 4 0 4 0   CHLORIDE mmol/L 107 106   CO2 mmol/L 24 22   BUN mg/dL 29* 26*   CREATININE mg/dL 1 12 1 14   CALCIUM mg/dL 9 2 9 2                   Review of Scheduled Meds:  Current Facility-Administered Medications   Medication Dose Route Frequency Provider Last Rate    acetaminophen  650 mg Oral Q6H PRN David Fajardo MD      azithromycin  250 mg Oral Q24H TOMY Ling      baclofen  5 mg Oral 4x Daily Lyndon Quezada MD      benzonatate  200 mg Oral TID TOMY Green      bisacodyl  10 mg Rectal Daily PRN David Fajardo MD      dabigatran etexilate  150 mg Oral Q12H [address] David Fajardo MD      famotidine  20 mg Oral Daily MD Maureen Fenton guaiFENesin  600 mg Oral Q12H [address] TOMY Estrada      losartan  100 mg Oral Daily Carly Andre MD      magnesium oxide  400 mg Oral Daily Carly Andre MD      melatonin  6 mg Oral QPM Sylwia Hagan DO      menthol-methyl salicylate   Apply externally BID Lola Martin MD      nebivolol  5 mg Oral Daily Carly Andre MD      nitroglycerin  0 4 mg Sublingual Q5 Min PRN TOMY Estrada      phenol  1 spray Mouth/Throat Q2H PRN Lola Martin MD      polyethylene glycol  17 g Oral Daily PRN Carly Andre MD      pravastatin  40 mg Oral Daily With Siena Shannon MD      tamsulosin  0 4 mg Oral Daily With Katarzyna Whitt MD      verapamil  240 mg Oral HS Carly Andre MD         Subjective/ HPI: Patient seen and examined  Patients overnight issues or events were reviewed with nursing or staff during rounds or morning huddle session  New or overnight issues include the following:     C/o of "saying that word again"  This same scenario as 9/2/22  Speaking fluently/clearly  C/o cough    ROS:   A 10 point ROS was performed; negative except as noted above       *Labs /Radiology studies reviewed  *Medications reviewed and reconciled as needed  *Please refer to order section for additional ordered labs studies  *Case discussed with primary attending during morning huddle case rounds    Physical Examination:  Vitals:   Vitals:    09/04/22 1345 09/04/22 2026 09/05/22 0500 09/05/22 0835   BP:  103/57 (!) 99/39 123/65   BP Location:  Left arm Right arm Right arm   Pulse: 60 65 60 72   Resp: 18 18 16    Temp: (!) 97 4 °F (36 3 °C) 98 °F (36 7 °C) 98 3 °F (36 8 °C)    TempSrc: Oral Oral Oral    SpO2: 100% 98% 97%    Weight:       Height:           General Appearance: no distress, conversive  HEENT: PERRLA, conjuctiva normal; oropharynx clear; mucous membranes moist   Neck:  Supple, normal ROM  Lungs: CTA, normal respiratory effort, no retractions, expiratory effort normal  CV: irregular rate and rhythm; no rubs/murmurs/gallops, PMI normal   ABD: soft; ND/NT; +BS  EXT: no edema  Skin: normal turgor, normal texture, no rashes  Psych: affect normal, mood normal  Neuro: AAO; +tone/weakness LLE with LUE strength 4/5/normal finger to nose  Speech is fluent and appropriate    The above physical exam was reviewed and updated as determined by my evaluation of the patient on 9/5/2022  Invasive Devices  Report    None                    VTE Pharmacologic Prophylaxis: Pradaxa  Code Status: Level 1 - Full Code  Current Length of Stay: 17 day(s)      Total time spent:  30 minutes with more than 50% spent counseling/coordinating care  Counseling includes discussion with patient re: progress  and discussion with patient of his/her current medical state/information  Coordination of patient's care was performed in conjunction with primary service  Time invested included review of patient's labs, vitals, and management of their comorbidities with continued monitoring  In addition, this patient was discussed with medical team including physician and advanced extenders  The care of the patient was extensively discussed and appropriate treatment plan was formulated unique for this patient  ** Please Note:  voice to text software may have been used in the creation of this document   Although proof errors in transcription or interpretation are a potential of such software**

## 2022-09-05 NOTE — PROGRESS NOTES
PM&R Coverage Progress Note:    Rehab Diagnosis: Stroke:  01 1  Left Body Involvement (Right Brain)    ASSESSMENT: Stable      PLAN:    Rehabilitation   Continue current rehabilitation plan of care to maximize function   Strange behaviors seem inconsistent with neurologic changes and more behavioral  Less likely from her stroke and potentially due to the baclofen based on timing and increase in frequency several days ago  Will reduce frequency to TID for now, would ultimately benefit from chemodenervation in the outpatient setting if oral baclofen is part of the reason for this   Video swallow for tomorrow with Speech for complaints during session today   Discussed case with neurology today and in agreement that majority of these symptoms don't seem neurologic in nature, perhaps some of the word substitution  Could potentially be medication related  Medical issues   No acute concerns   Continue current medical plan of care  Appreciate IM consultants co-management  SUBJECTIVE: Patient seen face to face  No acute issues  Progressing as expected in rehabilitation program  Some odd behaviors today  Had happened a couple days ago as well  She describes occasionally saying a made up word, however seems to acknowledge it is made up, stopping during the episodes to akcnowledge it doesn't make sense  She also today complained about blinking too much when changing her TV channels and making odd sounds with wiggling her tongue  Quite difficult to describe in a note, however all of which she can voluntarily stop  She denies fever, chills, nausea, emesis, shortness of breath, Continues to spit constantly into a cup, secretions thin and clear  Says she has ongoing difficulties swallowing  Discussed with patient, nursing, therapy, and IM  Urine culture grew proteus, but not enough to be considered a UTI, but will consider treating based on being a stone forming organizism   Already on azithromycin for possible tracheobronchitis, but seems to not be making a difference  ROS:  A ten point review of systems was completed on 09/05/22 and pertinent positives are listed in subjective section  All other systems reviewed were negative  OBJECTIVE:   /65 (BP Location: Right arm)   Pulse 72   Temp 98 3 °F (36 8 °C) (Oral)   Resp 16   Ht 5' 3" (1 6 m)   Wt 64 3 kg (141 lb 12 1 oz)   SpO2 97%   BMI 25 11 kg/m²     Physical Exam  Vitals and nursing note reviewed  Constitutional:       General: She is not in acute distress  HENT:      Head: Normocephalic and atraumatic  Eyes:      Conjunctiva/sclera: Conjunctivae normal    Cardiovascular:      Rate and Rhythm: Normal rate  Pulses: Normal pulses  Pulmonary:      Effort: Pulmonary effort is normal  No respiratory distress  Abdominal:      General: There is no distension  Palpations: Abdomen is soft  Musculoskeletal:      Cervical back: Neck supple  Right lower leg: No edema  Left lower leg: No edema  Skin:     General: Skin is warm and dry  Neurological:      Mental Status: She is alert and oriented to person, place, and time  Motor: Weakness present  Comments: Minimal tone in the left arm, slightly more in the leg   Psychiatric:         Mood and Affect: Mood normal       Comments:  Odd behaviors intermittently          Lab Results   Component Value Date    WBC 5 88 09/05/2022    HGB 10 7 (L) 09/05/2022    HCT 34 5 (L) 09/05/2022    MCV 96 09/05/2022     09/05/2022     Lab Results   Component Value Date    SODIUM 137 09/05/2022    K 4 0 09/05/2022     09/05/2022    CO2 24 09/05/2022    BUN 29 (H) 09/05/2022    CREATININE 1 12 09/05/2022    GLUC 72 09/05/2022    CALCIUM 9 2 09/05/2022     Lab Results   Component Value Date    INR 1 57 (H) 08/12/2022    INR 1 86 (H) 08/12/2022    INR 2 60 (H) 10/23/2018    PROTIME 19 0 (H) 08/12/2022    PROTIME 21 7 (H) 08/12/2022    PROTIME 27 9 (H) 10/23/2018 Current Facility-Administered Medications:     acetaminophen (TYLENOL) tablet 650 mg, 650 mg, Oral, Q6H PRN, Clifton Senior MD, 650 mg at 09/03/22 2112    [COMPLETED] azithromycin [facility]) tablet 500 mg, 500 mg, Oral, Q24H, 500 mg at 09/02/22 1803 **FOLLOWED BY** azithromycin (ZITHROMAX) tablet 250 mg, 250 mg, Oral, Q24H, TOMY Epps, 250 mg at 09/04/22 1328    baclofen tablet 5 mg, 5 mg, Oral, TID, Oral Colonel, DO    benzonatate (TESSALON PERLES) capsule 200 mg, 200 mg, Oral, TID, TOMY Epps, 200 mg at 09/05/22 8818    bisacodyl (DULCOLAX) rectal suppository 10 mg, 10 mg, Rectal, Daily PRN, Clifton Senior MD, 10 mg at 09/03/22 1818    dabigatran etexilate (PRADAXA) capsule 150 mg, 150 mg, Oral, Q12H [facility], Clifton Senior MD, 150 mg at 09/05/22 0834    famotidine (PEPCID) tablet 20 mg, 20 mg, Oral, Daily, Caryn Campoverde MD, 20 mg at 09/05/22 0834    guaiFENesin (MUCINEX) 12 hr tablet 600 mg, 600 mg, Oral, Q12H [facility], TOMY Epps, 600 mg at 09/05/22 0835    [START ON 9/6/2022] losartan (COZAAR) tablet 50 mg, 50 mg, Oral, Daily, TOMY Epps    magnesium oxide (MAG-OX) tablet 400 mg, 400 mg, Oral, Daily, Clifton Senior MD, 400 mg at 09/05/22 8180    melatonin tablet 6 mg, 6 mg, Oral, QPM, Oral Colonel, DO, 6 mg at 09/04/22 2100    menthol-methyl salicylate (BENGAY) 13-61 % cream, , Apply externally, BID, Caryn Campoverde MD, Given at 09/05/22 0835    nebivolol (BYSTOLIC) tablet 5 mg, 5 mg, Oral, Daily, Clifton Senior MD, 5 mg at 09/05/22 0835    nitroglycerin (NITROSTAT) SL tablet 0 4 mg, 0 4 mg, Sublingual, Q5 Min PRN, TOMY Epps    phenol (CHLORASEPTIC) 1 4 % mucosal liquid 1 spray, 1 spray, Mouth/Throat, Q2H PRN, Caryn Campoverde MD, 1 spray at 09/03/22 1515    polyethylene glycol (MIRALAX) packet 17 g, 17 g, Oral, Daily PRN, Clifton Senior MD, 17 g at 08/31/22 0705    pravastatin (PRAVACHOL) tablet 40 mg, 40 mg, Oral, Daily With Oumar Birmingham MD, 40 mg at 09/04/22 1646    tamsulosin (FLOMAX) capsule 0 4 mg, 0 4 mg, Oral, Daily With Karine Soto MD, 0 4 mg at 09/04/22 1646    verapamil (CALAN-SR) CR tablet 240 mg, 240 mg, Oral, HS, Lee Torres MD, 240 mg at 09/04/22 0730    Past Medical History:   Diagnosis Date    A-fib ([facility] )     Anemia     Arthritis     Breast pain, right     Chest pain on breathing     Colon polyp     Cough     Diverticulosis     Encounter for screening colonoscopy     H/O sick sinus syndrome     Heart murmur     High cholesterol     History of atrial fibrillation     Rate ontrolled  On xarelto 15mg (creatinine 50) Followed by Dr Raleigh Rodriguez with Cardiology     History of weight loss     Report loose fitting clothes  Weight stable (3lbs difference)  Last colo showed small tubular adenoma x2  Breast biopsy last showed fibrocystic changes  TSH wnl  Will check cbc, bmp, spep          Hx of long term use of blood thinners     Hypertension     Irregular heart beat     Pacemaker     Preop examination     Shortness of breath     Viral bronchitis        Patient Active Problem List    Diagnosis Date Noted    Cough 09/03/2022    Valvular heart disease 08/20/2022    CAD (coronary artery disease) 08/20/2022    Urinary retention 08/20/2022    Neck pain 08/20/2022    Healthcare maintenance 08/20/2022    At risk for coping difficulty 08/20/2022    Anemia 08/19/2022    At risk for venous thromboembolism (VTE) 08/19/2022    DARRIUS (acute kidney injury) ([facility] ) 08/19/2022    Spastic hemiparesis of left dominant side as late effect of cerebral infarction ([facility] ) 08/19/2022    Arterial ischemic stroke, MCA (middle cerebral artery), right, acute ([facility] ) 08/12/2022    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 ([facility] ) 08/12/2022    Renal insufficiency     Abnormal nuclear stress test 08/20/2021    Diverticulosis of colon 05/20/2019    Pacemaker 02/26/2018    Tubulovillous adenoma 02/09/2018    Gastroesophageal reflux disease without esophagitis 02/09/2018    Other chest pain 12/08/2017    Essential hypertension 12/08/2017    Hyperlipidemia LDL goal <100 12/08/2017    Stage 3 chronic kidney disease ([facility] ) 12/08/2017    Osteoarthritis of both wrists 12/08/2017    Chronic atrial fibrillation ([facility] ) 12/08/2017    Cavus deformity of foot 06/27/2017    Hallux abducto valgus, bilateral 06/27/2017    Lipoma of right upper extremity 05/17/2017    Pain due to onychomycosis of toenails of both feet 01/20/2017    Allergic rhinitis due to pollen 10/12/2015    Midline cystocele 12/23/2014    Osteoarthritis of hip 07/17/2014    B12 deficiency 12/06/2013    Osteopenia 11/15/2013    Left renal artery stenosis ([facility] ) 08/27/2013    Left atrial enlargement 08/27/2013      Alise Doan DO  Physical Medicine and [place] [facility]      Total time spent:  30 minutes with more than 50% spent counseling/coordinating care  Counseling includes discussion with patient re: progress and discussion with patient of his/her current medical/functional state/information  Coordination of patient's care was performed in conjunction with consulting services  Time invested included review of patient's labs, vitals, and management of their comorbidities with continued monitoring  The care of the patient was extensively discussed and appropriate treatment plan was formulated unique for this patient

## 2022-09-05 NOTE — PROGRESS NOTES
09/05/22 0945   Pain Assessment   Pain Assessment Tool 0-10   Pain Score No Pain   Restrictions/Precautions   Precautions Aspiration;Bed/chair alarms;Cognitive; Fall Risk;Supervision on toilet/commode   Swallow Assessment   Swallow Treatment Assessment Daily Dysphagia Tx Note     Patient Name: Oswaldo Hare    TCYJL'P Date: 9/5/2022    Current Risks for Dysphagia & Aspiration: CVA and dysarthria    Current Symptoms/Concerns: coughing on saliva, increased saliva/spitting of saliva, globus sensation    Current diet:regular diet and thin liquids     Premorbid diet::regular diet and thin liquids     Voice/Speech: clear, adequate    Positioning: upright in bed-repositioned upright    Items administered:Consistencies Administered: thin liquids and hard solids  Materials administered included chips, cookie, and thin water by straw sips    Total amount of meal consumed:   1 cookie  3 chips  ~160cc thins by straw    Oral stage:WFL  Lip closure: complete, functional bolus retrieval  Anterior spillage: none  Mastication: timely, appeared effective for breakdown of boluses  Bolus formation: WFL  Bolus control: suspected functional   Transfer: timely with solids and liquids  Oral residue: none  Pocketing: none    Pharyngeal stage:mild vs more esophageal related deficits  Swallow promptness: suspected to be timely, occasional delay and effort needed for solids   Hyolaryngeal elevation: present to palpation  Wet voice: no  Throat clear: strong throat clear x2 with solids  Cough: x1 with solids  Secondary swallows: no  Audible swallows: intermittent with solids    Esophageal stage:  Pt with belching and perceived loud swallows with some swallows of solids  Appeared as though greater amounts of air were present  Summary:  SLP was contacted by Internal Medicine CRNP regarding pt's continued expelling of saliva and pt's inconsistent reports of coughing/choking   CRNP considering completion of a VFSS to rule out symptoms being caused by dysphagia  Of note, pt had been seen by ST team previously for clinical swallow evaluation to which pt was found to be presenting with functional oral and pharyngeal swallow skills without recommendations for follow up  RN and PCA staff also report no overt s/s aspiration observed during meal times or with PO intake at this time  Therefore, SLP began session with patient interview of symptoms  Pt reports that food "gets stuck" while pointing to the top of her chest/bottom of her neck  Also reports occasional "coming back up" while pointing to her chest  In previous session, pt had denied any difficulty with PO intake  Today, pt does endorse difficulty swallowing, to include food feeling stuck, as well as coughing with foods  Pt agree able to only small amounts of PO intake to include hard solid cookie and chips, along with thin water by straw  Pt presenting with s/s suggestive of mild pharyngeal dysphagia (va esophageal dysphagia) across items assessed this date, though limited assessment  Pt demonstrated functional bolus retrieval, mastication, transfers and oral clearance as no pocketing or residual observed  Regarding swallow initiation, swallows suspected to be timely with liquids and intermittently delayed with greater effort needed with solids  Pt took single and consecutive sips of thins by straw without overt s/s aspiration  Pt at times taking smaller bites of solids perceived without difficulty but then also swallows appeared strenuous and audible  Pt elicited throat clears and appearing to have cough prior to swallowing cookie bite  Agreeable to only small amounts of solids at this time  After completion of food/drink, pt then expelled small amount of thin saliva into spit basin, but was cued to attempt to swallow  During intake, pt with increased belching, loud swallows which appeared to be of greater amounts of air with bolus and continued reports of globus sensation       At this time, pt is presenting with overt s/s aspiration, as well as s/s of esophageal dysphagia  After discussion with Alfredo Cali and Dr Dayne Solares, recommending completion of VFSS to further assess swallow function and to guide treatment plan and recommendations  May also consider recommendation for dedicated barium swallow, pending results and continued s/s  Recommendations:  Diet: regular diet and thin liquids (choosing softer solids for now)  Meds: whole with liquid  Strategies: upright posture, only feed when fully alert, slow rate of feeding, small bites/sips, quiet environment (tv off, limit talking, door closed, etc ) and alternating bites and sips    DISTANT supervision with meals  Results reviewed with:  patient, RN, MD and CRNP   Aspiration precautions posted  F/u ST tx: Pt is recommended for follow up skilled SLP services targeting swallow function in order to further assess skills, as well as to guide treatment plan and recommendations  Plan: VFSS scheduled for 9/6/22 at 1430     SLP Therapy Minutes   SLP Time In 0945   SLP Time Out 1020   SLP Total Time (minutes) 35   SLP Mode of treatment - Individual (minutes) 35   SLP Mode of treatment - Concurrent (minutes) 0   SLP Mode of treatment - Group (minutes) 0   SLP Mode of treatment - Co-treat (minutes) 0   SLP Mode of Treatment - Total time(minutes) 35 minutes   SLP Cumulative Minutes 400   Therapy Time missed   Time missed?  No

## 2022-09-06 ENCOUNTER — APPOINTMENT (OUTPATIENT)
Dept: RADIOLOGY | Facility: HOSPITAL | Age: 82
DRG: 057 | End: 2022-09-06
Payer: MEDICARE

## 2022-09-06 PROBLEM — Z91.89 AT RISK FOR DELIRIUM: Status: ACTIVE | Noted: 2022-09-06

## 2022-09-06 PROBLEM — B37.0 THRUSH: Status: ACTIVE | Noted: 2022-09-06

## 2022-09-06 PROCEDURE — 99232 SBSQ HOSP IP/OBS MODERATE 35: CPT

## 2022-09-06 PROCEDURE — 97530 THERAPEUTIC ACTIVITIES: CPT

## 2022-09-06 PROCEDURE — 92611 MOTION FLUOROSCOPY/SWALLOW: CPT

## 2022-09-06 PROCEDURE — 74230 X-RAY XM SWLNG FUNCJ C+: CPT

## 2022-09-06 PROCEDURE — 99232 SBSQ HOSP IP/OBS MODERATE 35: CPT | Performed by: INTERNAL MEDICINE

## 2022-09-06 PROCEDURE — 97112 NEUROMUSCULAR REEDUCATION: CPT

## 2022-09-06 PROCEDURE — 97110 THERAPEUTIC EXERCISES: CPT

## 2022-09-06 PROCEDURE — 97535 SELF CARE MNGMENT TRAINING: CPT

## 2022-09-06 RX ORDER — PANTOPRAZOLE SODIUM 40 MG/1
40 TABLET, DELAYED RELEASE ORAL
Status: DISCONTINUED | OUTPATIENT
Start: 2022-09-06 | End: 2022-09-13 | Stop reason: HOSPADM

## 2022-09-06 RX ORDER — ACETAMINOPHEN 325 MG/1
650 TABLET ORAL EVERY 6 HOURS PRN
Refills: 0
Start: 2022-09-06

## 2022-09-06 RX ORDER — ROSUVASTATIN CALCIUM 10 MG/1
5 TABLET, COATED ORAL DAILY
Refills: 0
Start: 2022-09-06 | End: 2022-10-10 | Stop reason: SDUPTHER

## 2022-09-06 RX ORDER — PANTOPRAZOLE SODIUM 40 MG/1
40 TABLET, DELAYED RELEASE ORAL
Refills: 0
Start: 2022-09-07 | End: 2022-10-10 | Stop reason: SDUPTHER

## 2022-09-06 RX ORDER — LANOLIN ALCOHOL/MO/W.PET/CERES
6 CREAM (GRAM) TOPICAL EVERY EVENING
Refills: 0
Start: 2022-09-07

## 2022-09-06 RX ORDER — TAMSULOSIN HYDROCHLORIDE 0.4 MG/1
0.4 CAPSULE ORAL
Refills: 0
Start: 2022-09-07 | End: 2022-10-10 | Stop reason: SDUPTHER

## 2022-09-06 RX ORDER — GUAIFENESIN 600 MG/1
600 TABLET, EXTENDED RELEASE ORAL EVERY 12 HOURS SCHEDULED
Refills: 0
Start: 2022-09-07

## 2022-09-06 RX ORDER — BISACODYL 10 MG
10 SUPPOSITORY, RECTAL RECTAL DAILY PRN
Qty: 12 SUPPOSITORY | Refills: 0
Start: 2022-09-06

## 2022-09-06 RX ORDER — LOSARTAN POTASSIUM 50 MG/1
50 TABLET ORAL DAILY
Refills: 0
Start: 2022-09-07 | End: 2022-10-10 | Stop reason: SDUPTHER

## 2022-09-06 RX ORDER — BACLOFEN 5 MG/1
5 TABLET ORAL 3 TIMES DAILY
Refills: 0
Start: 2022-09-07 | End: 2022-09-08

## 2022-09-06 RX ORDER — NITROGLYCERIN 0.4 MG/1
0.4 TABLET SUBLINGUAL
Refills: 0
Start: 2022-09-06

## 2022-09-06 RX ORDER — MUSCLE RUB CREAM 100; 150 MG/G; MG/G
CREAM TOPICAL 2 TIMES DAILY
Refills: 0
Start: 2022-09-07

## 2022-09-06 RX ORDER — POLYETHYLENE GLYCOL 3350 17 G/17G
17 POWDER, FOR SOLUTION ORAL DAILY PRN
Refills: 0
Start: 2022-09-06

## 2022-09-06 RX ORDER — BENZONATATE 200 MG/1
200 CAPSULE ORAL 3 TIMES DAILY
Refills: 0
Start: 2022-09-07 | End: 2022-09-13 | Stop reason: SDUPTHER

## 2022-09-06 RX ADMIN — CEPHALEXIN 500 MG: 500 CAPSULE ORAL at 05:28

## 2022-09-06 RX ADMIN — PRAVASTATIN SODIUM 40 MG: 40 TABLET ORAL at 17:26

## 2022-09-06 RX ADMIN — Medication 6 MG: at 20:24

## 2022-09-06 RX ADMIN — MENTHOL, METHYL SALICYLATE: 10; 15 CREAM TOPICAL at 17:28

## 2022-09-06 RX ADMIN — CEPHALEXIN 500 MG: 500 CAPSULE ORAL at 23:47

## 2022-09-06 RX ADMIN — FAMOTIDINE 20 MG: 20 TABLET, FILM COATED ORAL at 08:45

## 2022-09-06 RX ADMIN — TAMSULOSIN HYDROCHLORIDE 0.4 MG: 0.4 CAPSULE ORAL at 17:27

## 2022-09-06 RX ADMIN — VERAPAMIL HYDROCHLORIDE 240 MG: 240 TABLET ORAL at 21:09

## 2022-09-06 RX ADMIN — BACLOFEN 5 MG: 10 TABLET ORAL at 17:27

## 2022-09-06 RX ADMIN — CEPHALEXIN 500 MG: 500 CAPSULE ORAL at 17:26

## 2022-09-06 RX ADMIN — NYSTATIN 500000 UNITS: 100000 SUSPENSION ORAL at 13:53

## 2022-09-06 RX ADMIN — PANTOPRAZOLE SODIUM 40 MG: 40 TABLET, DELAYED RELEASE ORAL at 12:22

## 2022-09-06 RX ADMIN — DABIGATRAN ETEXILATE MESYLATE 150 MG: 150 CAPSULE ORAL at 20:24

## 2022-09-06 RX ADMIN — BACLOFEN 5 MG: 10 TABLET ORAL at 20:25

## 2022-09-06 RX ADMIN — GUAIFENESIN 600 MG: 600 TABLET, EXTENDED RELEASE ORAL at 08:46

## 2022-09-06 RX ADMIN — BACLOFEN 5 MG: 10 TABLET ORAL at 08:45

## 2022-09-06 RX ADMIN — NEBIVOLOL 5 MG: 5 TABLET ORAL at 08:47

## 2022-09-06 RX ADMIN — BENZONATATE 200 MG: 100 CAPSULE ORAL at 08:46

## 2022-09-06 RX ADMIN — LOSARTAN POTASSIUM 50 MG: 50 TABLET, FILM COATED ORAL at 08:45

## 2022-09-06 RX ADMIN — BENZONATATE 200 MG: 100 CAPSULE ORAL at 17:26

## 2022-09-06 RX ADMIN — GUAIFENESIN 600 MG: 600 TABLET, EXTENDED RELEASE ORAL at 20:25

## 2022-09-06 RX ADMIN — NYSTATIN 500000 UNITS: 100000 SUSPENSION ORAL at 17:31

## 2022-09-06 RX ADMIN — BENZONATATE 200 MG: 100 CAPSULE ORAL at 20:24

## 2022-09-06 RX ADMIN — NYSTATIN 500000 UNITS: 100000 SUSPENSION ORAL at 21:08

## 2022-09-06 RX ADMIN — DABIGATRAN ETEXILATE MESYLATE 150 MG: 150 CAPSULE ORAL at 08:45

## 2022-09-06 RX ADMIN — MENTHOL, METHYL SALICYLATE 1 APPLICATION: 10; 15 CREAM TOPICAL at 08:47

## 2022-09-06 RX ADMIN — MAGNESIUM OXIDE TAB 400 MG (241.3 MG ELEMENTAL MG) 400 MG: 400 (241.3 MG) TAB at 08:45

## 2022-09-06 RX ADMIN — CEPHALEXIN 500 MG: 500 CAPSULE ORAL at 11:01

## 2022-09-06 NOTE — PLAN OF CARE
Problem: MOBILITY - ADULT  Goal: Maintain or return to baseline ADL function  Description: INTERVENTIONS:  -  Assess patient's ability to carry out ADLs; assess patient's baseline for ADL function and identify physical deficits which impact ability to perform ADLs (bathing, care of mouth/teeth, toileting, grooming, dressing, etc )  - Assess/evaluate cause of self-care deficits   - Assess range of motion  - Assess patient's mobility; develop plan if impaired  - Assess patient's need for assistive devices and provide as appropriate  - Encourage maximum independence but intervene and supervise when necessary  - Involve family in performance of ADLs  - Assess for home care needs following discharge   - Consider OT consult to assist with ADL evaluation and planning for discharge  - Provide patient education as appropriate  Outcome: Progressing  Goal: Maintains/Returns to pre admission functional level  Description: INTERVENTIONS:  - Set and communicate daily mobility goal to care team and patient/family/caregiver  - Collaborate with rehabilitation services on mobility goals if consulted  - Perform Range of Motion 3 times a day  - Reposition patient every 2 hours    - Dangle patient 3 times a day  - Stand patient 3 times a day  - Ambulate patient 3 times a day  - Out of bed to chair 3 times a day   - Out of bed for meals 3 times a day  - Out of bed for toileting  - Record patient progress and toleration of activity level   Outcome: Progressing     Problem: Prexisting or High Potential for Compromised Skin Integrity  Goal: Skin integrity is maintained or improved  Description: INTERVENTIONS:  - Identify patients at risk for skin breakdown  - Assess and monitor skin integrity  - Assess and monitor nutrition and hydration status  - Monitor labs   - Assess for incontinence   - Turn and reposition patient  - Assist with mobility/ambulation  - Relieve pressure over bony prominences  - Avoid friction and shearing  - Provide appropriate hygiene as needed including keeping skin clean and dry  - Evaluate need for skin moisturizer/barrier cream  - Collaborate with interdisciplinary team   - Patient/family teaching  - Consider wound care consult   Outcome: Progressing     Problem: Potential for Falls  Goal: Patient will remain free of falls  Description: INTERVENTIONS:  - Educate patient/family on patient safety including physical limitations  - Instruct patient to call for assistance with activity   - Consult OT/PT to assist with strengthening/mobility   - Keep Call bell within reach  - Keep bed low and locked with side rails adjusted as appropriate  - Keep care items and personal belongings within reach  - Initiate and maintain comfort rounds  - Make Fall Risk Sign visible to staff  - Offer Toileting every 2-4 Hours, in advance of need  - Initiate/Maintain bed/chair alarm  - Obtain necessary fall risk management equipment: nonskid footwear  - Apply yellow socks and bracelet for high fall risk patients  - Consider moving patient to room near nurses station  Outcome: Progressing     Problem: PAIN - ADULT  Goal: Verbalizes/displays adequate comfort level or baseline comfort level  Description: Interventions:  - Encourage patient to monitor pain and request assistance  - Assess pain using appropriate pain scale  - Administer analgesics based on type and severity of pain and evaluate response  - Implement non-pharmacological measures as appropriate and evaluate response  - Consider cultural and social influences on pain and pain management  - Notify physician/advanced practitioner if interventions unsuccessful or patient reports new pain  Outcome: Progressing     Problem: INFECTION - ADULT  Goal: Absence or prevention of progression during hospitalization  Description: INTERVENTIONS:  - Assess and monitor for signs and symptoms of infection  - Monitor lab/diagnostic results  - Monitor all insertion sites, i e  indwelling lines, tubes, and drains  - Monitor endotracheal if appropriate and nasal secretions for changes in amount and color  - [place] appropriate cooling/warming therapies per order  - Administer medications as ordered  - Instruct and encourage patient and family to use good hand hygiene technique  - Identify and instruct in appropriate isolation precautions for identified infection/condition  Outcome: Progressing     Problem: DISCHARGE PLANNING  Goal: Discharge to home or other facility with appropriate resources  Description: INTERVENTIONS:  - Identify barriers to discharge w/patient and caregiver  - Arrange for needed discharge resources and transportation as appropriate  - Identify discharge learning needs (meds, wound care, etc )  - Arrange for interpretive services to assist at discharge as needed  - Refer to Case Management Department for coordinating discharge planning if the patient needs post-hospital services based on physician/advanced practitioner order or complex needs related to functional status, cognitive ability, or social support system  Outcome: Progressing     Problem: Nutrition/Hydration-ADULT  Goal: Nutrient/Hydration intake appropriate for improving, restoring or maintaining nutritional needs  Description: Monitor and assess patient's nutrition/hydration status for malnutrition  Collaborate with interdisciplinary team and initiate plan and interventions as ordered  Monitor patient's weight and dietary intake as ordered or per policy  Utilize nutrition screening tool and intervene as necessary  Determine patient's food preferences and provide high-protein, high-caloric foods as appropriate       INTERVENTIONS:  - Monitor oral intake, urinary output, labs, and treatment plans  - Assess nutrition and hydration status and recommend course of action  - Evaluate amount of meals eaten  - Assist patient with eating if necessary   - Allow adequate time for meals  - Recommend/ encourage appropriate diets, oral nutritional supplements, and vitamin/mineral supplements  - Order, calculate, and assess calorie counts as needed  - Recommend, monitor, and adjust tube feedings and TPN/PPN based on assessed needs  - Assess need for intravenous fluids  - Provide specific nutrition/hydration education as appropriate  - Include patient/family/caregiver in decisions related to nutrition  Outcome: Progressing

## 2022-09-06 NOTE — PROGRESS NOTES
09/06/22 1500   Pain Assessment   Pain Assessment Tool 0-10   Pain Score No Pain   Restrictions/Precautions   Precautions Aspiration;Bed/chair alarms;Cognitive; Fall Risk;Supervision on toilet/commode   Swallow Assessment   Swallow Treatment Assessment A VFSS was recommended to further assess oropharyngeal stage swallowing skills at this time, as well as to guide treatment plan and recommendations  Current concerns for dysphagia include reports of food feeling "stuck," reports of reflux and coughing observed with hard solids at bedside, however, RN staff reports no observed difficulties with meals to include coughing, choking, and throat clearing  Pt also frequently spitting saliva into a basin and reporting "I have to bring it up" while attempting to expel saliva  Pt is able to swallow saliva when prompted to, but at times prefers to expel into basin  A VFSS was recommended to further assess oropharyngeal stage swallowing skills at this time, as well as to guide treatment plan and recommendations  Based on VFSS completed this date, pt presents with overall functional oral and pharyngeal swallow skills given pt's current age and across items assessed this study  Despite mildly reduced oral control of liquids when taken by cup sips, resulting in premature spillage to the level of the pyriforms, pt demonstrated greater oral control with use of straw and maintained airway protection when taken via cup and straw  Overall swallows remained timely with adequate airway protection with intake of all items  Occasional high penetration of thin liquids but no further deeper penetration or aspiration events  All boluses passed through pharynx and esophagus during brief esophageal screen  Pt without any coughing, throat clearing or expelling of saliva  Pt also denied globus sensation or reflux during study  See SLP Procedure Note for full details   Recommend brief follow up to monitor skills and for education related to reflux precautions as pt reports reflux symptoms  SLP Therapy Minutes   SLP Time In 1500   SLP Time Out 1600   SLP Total Time (minutes) 60   SLP Mode of treatment - Individual (minutes) 60   SLP Mode of treatment - Concurrent (minutes) 0   SLP Mode of treatment - Group (minutes) 0   SLP Mode of treatment - Co-treat (minutes) 0   SLP Mode of Treatment - Total time(minutes) 60 minutes   SLP Cumulative Minutes 460   Therapy Time missed   Time missed?  No

## 2022-09-06 NOTE — PROGRESS NOTES
09/06/22 0830   Pain Assessment   Pain Assessment Tool 0-10   Pain Score No Pain   Restrictions/Precautions   Precautions Aspiration;Bed/chair alarms;Cognitive;Supervision on toilet/commode; Fall Risk   Weight Bearing Restrictions No   ROM Restrictions No   Braces or Orthoses AFO  (lle)   Subjective   Subjective Pt states she hasn't urinated since last night, reports wanting to try to sit on toilet   Continues to spit up saliva during therapy session   Sit to Lying   Type of Assistance Needed Physical assistance   Physical Assistance Level 25% or less   Comment with R HR, A for LLE management   Sit to Lying CARE Score 3   Lying to Sitting on Side of Bed   Type of Assistance Needed Physical assistance   Physical Assistance Level 25% or less   Comment first trial pt CS, 2nd trial A for LLE management, use of R bedrail   Lying to Sitting on Side of Bed CARE Score 3   Sit to Stand   Type of Assistance Needed Physical assistance   Physical Assistance Level 51%-75%   Comment modAx1; occ maxAx1 when pt retropulsive; needs max vc's and A to get LLE into place prior to standing   Sit to Stand CARE Score 2   Bed-Chair Transfer   Type of Assistance Needed Physical assistance   Physical Assistance Level 26%-50%   Comment modAx1 sit pivot transfer   Chair/Bed-to-Chair Transfer CARE Score 3   Walk 10 Feet   Type of Assistance Needed Physical assistance   Physical Assistance Level Total assistance   Comment b/l HHA for NPP   Walk 10 Feet CARE Score 1   Walk 50 Feet with Two Turns   Type of Assistance Needed Physical assistance   Physical Assistance Level Total assistance   Comment b/l HHA for NPP   Walk 50 Feet with Two Turns CARE Score 1   Walk 150 Feet   Type of Assistance Needed Physical assistance   Physical Assistance Level Total assistance   Walk 150 Feet CARE Score 1   Walking 10 Feet on Uneven Surfaces   Reason if not Attempted Safety concerns   Walking 10 Feet on Uneven Surfaces CARE Score 88   Ambulation   Does the patient walk? 2  Yes   Primary Mode of Locomotion Prior to Admission Walk   Distance Walked (feet) 200 ft  (x3; with use of b/l HHA this session for NPP)   Assist Device Hand Hold   Gait Pattern Inconsistant Linda; Slow Linda;Decreased foot clearance;L foot drag;Narrow LUCY;Scissoring; Step to; Step through; Decreased L stance; Improper weight shift   Limitations Noted In Balance; Coordination; Endurance; Heel Strike;Posture; Safety; Sequencing;Speed;Strength;Swing   Provided Assistance with: Balance;Direction   Walk Assist Level Moderate Assist  (AX2 with CF)   Findings performed b/l HHA amb for NPP this session to improve gait speed and step length; can again resume to using RW for more functional gait next session   Wheelchair mobility   Findings not focus of this session   Curb or Single Stair   Style negotiated Single stair   Type of Assistance Needed Physical assistance   Physical Assistance Level Total assistance   Comment with b/l HRs, step to pattern, retro descent for first trial, turning around for 2nd trial; limited by fatigue; requires A for LLE when descending   1 Step (Curb) CARE Score 1   4 Steps   Type of Assistance Needed Physical assistance   Physical Assistance Level Total assistance   Comment with b/l HRs, step to pattern, retro descent for first trial, turning around for 2nd trial; limited by fatigue; requires A for LLE when descending   4 Steps CARE Score 1   12 Steps   Comment fatigued at 6 steps   Reason if not Attempted Safety concerns   12 Steps CARE Score 88   Stairs   Type Stairs   # of Steps 6  (x1, 4x1)   Weight Bearing Precautions Fall Risk   Assist Devices Bilateral Rail   Findings pt ascending with RLE, descending LLE but requires max vc's for this   Picking Up Object   Reason if not Attempted Safety concerns   Picking Up Object CARE Score 88   Toilet Transfer   Type of Assistance Needed Physical assistance   Physical Assistance Level 51%-75%   Comment mod-maxAx1 stand pivot transfer with grab bars to standard toilet; Ax2 for toileting hygiene   Toilet Transfer CARE Score 2   Toilet Transfer   Findings pt with soft smear BM, required multiple stands with grab bars to complete rear hygiene, total A for rear hygiene in stance   Therapeutic Interventions   Flexibility seated b/l LE static and dynamic stretching 8' total   Neuromuscular Re-Education see ambulation for details   Assessment   Treatment Assessment 90 min skilled PT session focusing on transfer trng and multiple stands to complete toileting, bed mobility, and NPP interventions in order to improve pt's coordination, balance, righting reactions and motor planning therefore improving pt's overall mobility status  Pt needing to toilet start of session as she had not urinated since last night  Approx 30 mins spent in bathroom with multiple STSs required as pt with ongoing BM but pt would report she was finished having BM but upon stance continued to go  Due to increased tone in LLE performed static and dynamic LE stretching start of session to improve ROM prior to NPP interventions  Focused soley on amb with b/l HHA to improve gait speed and step length, can return to trialing RW with pt tomorrow for more functional mobility  Trialed steps again this session but pt fatiguing quicker compared to previous trial, only able to complete 6 steps max today  Pt did complete amb trials prior to steps however  At this time pt still cont to require external assistance for all mobilities and due to decreased family support recommend d/c to subacute facility  At this time cont with POC focusing on NPP interventions for stroke recovery, LE ROM to reduce risk for contractures and reduce tone, improving pt's standing balance and tolerance, and transfer trng  Family/Caregiver Present no   Problem List Decreased strength;Decreased range of motion;Decreased endurance; Impaired balance;Decreased mobility; Decreased coordination;Decreased cognition; Impaired judgement;Decreased safety awareness; Impaired tone   Barriers to Discharge Inaccessible home environment;Decreased caregiver support   PT Barriers   Functional Limitation Car transfers;Stair negotiation;Standing;Transfers; Walking; Wheelchair management   Plan   Treatment/Interventions Functional transfer training;LE strengthening/ROM; Elevations; Therapeutic exercise; Endurance training;Cognitive reorientation; Bed mobility; Equipment eval/education;Gait training; Compensatory technique education   Progress Slow progress, decreased activity tolerance   Recommendation   PT Discharge Recommendation   (SNF)   PT Therapy Minutes   PT Time In 0830   PT Time Out 1000   PT Total Time (minutes) 90   PT Mode of treatment - Individual (minutes) 90   PT Mode of treatment - Concurrent (minutes) 0   PT Mode of treatment - Group (minutes) 0   PT Mode of treatment - Co-treat (minutes) 0   PT Mode of Treatment - Total time(minutes) 90 minutes   PT Cumulative Minutes 1465   Therapy Time missed   Time missed?  No

## 2022-09-06 NOTE — CASE MANAGEMENT
Met w/pts son and reviewed team update and informed pt would be ready in the next day or so for subacute setting  Son reported pt told him she would be sent home soon  Cm confirmed team discussion re: the need for subacute setting  Son reported he had a meeting with [facility] to discuss a first flr set up  Ref[facility] made to [facility]  Cm to follow up when a determination is known re: subacute

## 2022-09-06 NOTE — TEAM CONFERENCE
Acute RehabilitationTeam Conference Note  Date: 9/6/2022   Time: 11:50 AM       Patient Name:  Deepa Garrett       Medical Record Number: [id number]   YOB: 1940  Sex: Female          Room/Bed:  /-01  Payor Info:  Payor: [address] [place] [address]  REP / Plan: Christian Hoff / Product Type: Medicare HMO /      Admitting Diagnosis: Stroke (cerebrum) ([facility] ) [I63 9]   Admit Date/Time:  8/19/2022  3:22 PM  Admission Comments: No comment available     Primary Diagnosis:  Arterial ischemic stroke, MCA (middle cerebral artery), right, acute ([facility] )  Principal Problem: Arterial ischemic stroke, MCA (middle cerebral artery), right, acute [facility])    Patient Active Problem List    Diagnosis Date Noted    Cough 09/03/2022    Valvular heart disease 08/20/2022    CAD (coronary artery disease) 08/20/2022    Urinary retention 08/20/2022    Neck pain 08/20/2022    Healthcare maintenance 08/20/2022    At risk for coping difficulty 08/20/2022    Anemia 08/19/2022    At risk for venous thromboembolism (VTE) 08/19/2022    DARRIUS (acute kidney injury) ([facility] ) 08/19/2022    Spastic hemiparesis of left dominant side as late effect of cerebral infarction ([facility] ) 08/19/2022    Arterial ischemic stroke, MCA (middle cerebral artery), right, acute ([facility] ) 08/12/2022    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 ([facility] ) 08/12/2022    Renal insufficiency     Abnormal nuclear stress test 08/20/2021    Diverticulosis of colon 05/20/2019    Pacemaker 02/26/2018    Tubulovillous adenoma 02/09/2018    Gastroesophageal reflux disease without esophagitis 02/09/2018    Other chest pain 12/08/2017    Essential hypertension 12/08/2017    Hyperlipidemia LDL goal <100 12/08/2017    Stage 3 chronic kidney disease ([facility] ) 12/08/2017    Osteoarthritis of both wrists 12/08/2017    Chronic atrial fibrillation ([facility] ) 12/08/2017    Cavus deformity of foot 06/27/2017    Hallux abducto valgus, bilateral 06/27/2017    Lipoma of right upper extremity 05/17/2017    Pain due to onychomycosis of toenails of both feet 01/20/2017    Allergic rhinitis due to pollen 10/12/2015    Midline cystocele 12/23/2014    Osteoarthritis of hip 07/17/2014    B12 deficiency 12/06/2013    Osteopenia 11/15/2013    Left renal artery stenosis ([address] ) 08/27/2013    Left atrial enlargement 08/27/2013       Physical Therapy:    Weight Bearing Status: Full Weight Bearing  Transfers: Moderate Assistance  Bed Mobility: Moderate Assistance  Amulation Distance (ft): 200 feet (b/l hha; w/ ')  Ambulation: Moderate Assistance (modAx1 w/ ', Ax2 b/l hha for longer distances)  Assistive Device for Ambulation: Roller Walker  Wheelchair Mobility Distance: 150 ft  Wheelchair Mobility: Minimal Assistance, Moderate Assistance  Number of Stairs: 12  Assistive Device for Stairs: Bilateral Hand Rails  Stair Assistance: Maximum Assistance  Ramp:  (not trialed due to safety concerns)  Discharge Recommendations:  (SNF)    Patient making progress thusfar with skilled PT intervention  She has progressed this week from walking on the BWSS to overground walking with moderate/max Ax1 and a chair follow  During walking, she requires physical assist for weight shifting to clear ground, verbal cues at times to increase step length, and presents with high tone/spasticity which prevents her LE from buckling but does pull her into external rotation/toe out  Additional barriers to safe walking include retropulsion, poor dynamic balance, dec sensation/proprioception and poor righting reactions  She remains a high caregiver burden which is also a barrier to discharge as she lives home alone with aides helping M-Thursday for 7-7 5 hour a day  She is unsafe to return home and requires continued skilled PT intervention to maximize her function and safety       9/5/22  Pt cont to make slow progress towards LTGs however due to cognitive deficits, ongoing L sided deficits including hypertonia, balance deficits, delayed righting reactions, coordination deficits, and decreased family support, pt unable to return home alone at this time as she cont to require external assist for all transfers and ambulation  Pt is currently functioning at modAx1 for transfers and ambulation with RW, modAx1 for w/c propulsion  Anticipating d/c to SNF for ongoing skilled therapy services to maximize pt's independence with mobilities  Occupational Therapy:  Eating: Supervision  Grooming: Supervision  Bathing: Maximum Assistance  Bathing: Maximum Assistance  Upper Body Dressing: Minimal Assistance  Lower Body Dressing: Moderate Assistance  Toileting: Maximum Assistance  Tub/Shower Transfer: Maximum Assistance  Toilet Transfer: Moderate Assistance  Cognition: Exceptions to WNL  Cognition: Decreased Memory, Decreased Attention, Decreased Comprehension  Orientation: Person, Place, Situation  Discharge Recommendations: Other       Occupational Therapy Weekly Team Note    Pt is demonstrating good progress with occupational therapy and is progressing toward long term goals for ADL, IADL, and functional transfers/mobility  Pts long term goals for ADLs are Independent with [address] and wheelchair  Pt continues to present with impairments in activity tolerance, endurance, standing balance/tolerance, sitting balance/tolerance, UE strength, UE ROM, and (L) attention   Occupational performance remains limited by abnormal tone, (L) UE dysmetria , (L) hemiparesis, decreased caregiver support, and risk for falls  Pt will continue to benefit from skilled acute rehab OT services to address above mentioned barriers and maximize functional independence in baseline areas of occupation to meet established treatment goals with overall decreased burden of care   Plan of care to continue to focus on ADL Retraining , LB Dressing, UB dressing,  LHAE education/training, Functional Cognition, Functional Attention, Standing tolerance, Standing balance , UE NMR left, midline awareness, Fine motor coordination, Gross motor coordination, R attention, DME training/education, Family training/education, Energy conservation training/education, healthy coping education, Leisure and social pursuits, and sitting balance  Plan for pt to be DC to [facility] to maximize function with ADL completion  Pt functioning at Mod/Max A overall for ADL completion and would benefit from continued OT services with interventions focused on impairments mentioned above  Speech Therapy:  Mode of Communication: Verbal  Cognition: Exceptions to WNL  Cognition: Decreased Memory, Decreased Executive Functions, Decreased Attention, Decreased Comprehension  Orientation: Person, Place, Time, Situation  Swallowing: Exceptions to WNL  Swallowing:  (pharyngeal vs esophageal dysphagia-to be further evaluated on VFSS)  Diet Recommendations: Regular Diet, Thin  Discharge Recommendations:  (pending pt progress)  DC Home with[de-identified]  (continued skilled SLP services)  Cognitive linguistic evaluation was initiated this session, consisting of patient interview and portions of the Informal Cognitive Linguistic Evaluation Tool  Evaluation was completed in [place]ian, as this is pt's primary language, utilizing the [employer] phone  Pt's daughter, Paul Fisher, was also present throughout evaluation  Pt reports that she lives alone but does have a HHA present to assist her, however, did not mention additional details related to this as she states her HHA has returned to work elsewhere  Pt and pt's daughter indicate that pt's son and her daughter in law assist pt with driving and other tasks as needed  Pt reports that she was independent for all IADLs but that her son has now been managing her finances since her admission   During interview, pt demonstrated good insight into physical deficits, also explaining the 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 as this affects her ability to complete tasks independently at this time  Overall, current assessment is limited but pt is presenting with suspected mild deficits in cognitive linguistic skills to include decreased working memory, short term memory, problem solving, attention, comprehension, and executive functions  Pt was appropriate in social interaction and demonstrated fairly functional expression and basic comprehension skills, however, did benefit from occasional repetition of information to improve understanding  Pt will benefit from further evaluation of skills in order to obtain greater insight and information into current functioning  Based on current evaluation, pt is recommended for skilled SLP services during acute rehab stay in order to further assess current skills, as well as to maximize overall cognitive linguistic skills for increased level of independence and safety and to decrease caregiver burden on discharge  Update from week 8/30/2022: Pt continues to be followed for ongoing cognitive tx sessions, in which pt is making slower and steady progress at this time  It is noted that while pt is able to understand basic questions/information in [place], pt does benefit from more complex questions to be presented in [address] [place] [address]  Additionally, when engaging in structured tasks, presentation of information is provided in Welsh at this time  Ongoing barriers which present include decreased ST/working memory, decreased executive function skills (problem solving, reasoning, sequencing, organization of thoughts), but when pt is provided increased time and rephrasing given information, improvement is noted in comprehension skills  Currently pt is functioning at supervision level given comprehension, expression, executive function and memory skills and mod I for social interaction  Of note, in more recent sessions, pt had increased cough earlier in the day where pt was bringing up mucous   When engaging in d/w pt in regard to this, SLP did use Zhitu ashlyn for improved comprehension given questions  Pt does deny any h/o allergies, post nasal drip prior to admission  In pt's description of symptoms to SLP, sounds likely due to post nasal drip type symptoms  When asking if pt has been exhibiting any increased cough given meals, pt did deny at this time  SLP reached out to MD, Dr Sherryle Mule to discuss decongestant due to suspected post nasal drip, to where it was noted that swallow evaluation orders have been placed  Dysphagia bedside assessment was completed in which based on oral mechanism exam and observation of meal, pt demonstrates oral motor skills and swallowing function that are considered WFL  Adequate oral motor coordination/control and strong cough are positive factors in reducing her risk for aspiration  Pt denies chronic coughing and/or overt s/sx of aspiration during mealtime, but notes coughing up mucous between meals; therefore, suspect coughing may be secondary to sinuses/post nasal drip  SLP reviewed safe swallowing strategies (slow rate, small bites, alternating liquids and solids, remain upright for 30-45 mins after eating) and pt verbalized understanding  Pt independently alternated liquids and solids consistently throughout meal  Although she was noted to occasionally take larger bites and use a faster rate, she safely managed mixed consistencies  Skilled SLP services targeting dysphagia are not warranted at this time as , but encourage pt to alert staff if she notices any changes with swallowing function  Currently primary focus will be for cognitive tx sessions to maximize overall independence given cognitive linguistic skills to decrease overall caregiver burden at time of discharge  Update week of 9/6/2022: Pt continues to be followed for cognitive linguistic therapy, in addition to dysphagia therapy as of yesterday   Regarding cognitive linguistic skills, pt remains with decreased processing, comprehension, short term memory recall/working memory, problem solving/reasoning, and executive functions noting these areas to be barriers at this time  Pt is currently functioning at supervision for expression, min assist to supervision for comprehension and min assist for memory and problem solving  Additionally, pt recently reported new difficulty swallowing and feeling of food being "stuck" at the level of her lower neck/upper chest area  Pt continues to have increased spitting out of saliva, along with occasional coughing episodes outside of PO intake  However, yesterday, pt reported difficulty in swallowing food and demonstrated coughing with food intake  Pt was reassessed for swallow function where pt found to be presenting with overt pharyngeal s/s dysphagia vs s/s of esophageal dysphagia and was recommended for completion of a VFSS to further evaluation swallow function and to assist in guiding recommendations and treatment plan based on discussion with internal medicine and PMR physician  VFSS planned for 9/6/22 at 1430 with results to follow  Pt remains on a regular diet and thin liquids, with recommendations to choose softer items for now  No overt s/s aspiration with thin liquids  At this time, pt is recommended for further skilled SLP services in order to maximize overall cognitive linguistic skills, as well as to further evaluate swallow function and to guide in recommendations and treatment plan  Nursing Notes:  Appetite: Good  Diet Type: Regular/House                      Diet Patient/Family Education Complete:  Yes                         Type of Wound Patient/Family Education: Yes  Bladder: Continent     Bladder Patient/Family Education: Yes  Bowel: Continent     Bowel Patient/Family Education: Yes  Pain Location/Orientation: Location: Neck  Pain Score: 0                       Hospital Pain Intervention(s): Repositioned  Pain Patient/Family Education: Yes  Medication Management/Safety  Safe Administration: Yes (by staff)  Medication Patient/Family Education Complete: No (on going)    Acute embolic right MCA CVA - Had near occlusion of right MCA, Felt to be a Xarelto failure and was switched to Pradaxa, ECHO w/o thrombus, Continue statin, On Baclofen for tone left arm = much improved  Chronic atrial fibrillation - Sees Dr Holli Chi as OP, Rates controlled, On Pradaxa now, Xarelto stopped  PPM - VVI, Is not MRI conditional  Valvular disease - Current ECHO:  LVEF 65%, mod AR/mild AS, mod TR with severely elevated RV systolic pressure, mild-mod MR, mod LAE/mild NEO, Euvolemic currently  HTN - Home:  Verapamil 240mg qhs/Cozaar 499 mg qd/Bystolic 5mg qd/lasix 63TJ qd/Aldactone 25mg qd, Here:  Verapamil 240mg qhs/Cozaar 261FM qd/Bystolic 5mg qd, OP note states has left RA stenosis but no testing in OP records or Care Everywhere to verify, No changes again today  HLD - Home:  Crestor 10mg qd = Neuro rec when discharged to reduce to 5mg qd, Here:  Pravachol 40mg qd  DARRIUS - S/p NS 1 liter, Back to baseline, Continue to hold Lasix and Aldactone for now  CAD - Nonobstructive, Card cath 8/2021 done for borderline abn stress test and found 50% mid RCA/40% mid LAD = no intervention done, Continue statin, Was not on ASA as an OP  Hypomagnesemia - Takes here and at home 400 mg MagOx, Mg level 8/20 was 2 4  Left chest pain - Had left neck pain (not new for her) then pain under left breast = reproducible with palpation, EKG was 100% v-paced but no obvious acute changes; trops negative Bengay applied, At the time, felt likely M/S from spasm  She then had worsening pain in chest and described pressure so RR called, Trops = 29/-, 28/-1, 28/-1, Etio is her spasticity and Dr Tapan Anderson inc Baclofen to 5mg TID and 5mg qhs prn which has helped but she is still having some left neck discomfort   Cough - Have not witnessed her coughing but she endorses, Has some clear-whitish secretions, CXR negative 8/25,  Depadua added Pepcid in case etio of cough is 2/2 GERD, Has prn Robitussin, Added Flonase = stopped since not effective, Inc tessalon perles to 200mg TID 8/28  Urine retention - Getting str cath'd, Management per PMR  Vaginal yeast infection - Monistat pack and gave 1 dose of Diflucan 150mg  Pt is continent of both bowel and bladder  Pt requires alarms for safety  This week we will continue to encourage independence with ADLs while monitoring labs and vitals and maintaining skin integrity  We will continue to keep the patent free from falls by maintaining safety precautions with patient education and safe transfers  We will continue to assess the patient's pain and medicate accordingly  Case Management:     Discharge Planning  Living Arrangements: Lives Alone  Support Systems: Son  Assistance Needed: TBD  Type of Current Residence: Other (Comment)  Current Home Care Services: Yes  Type of Current Home Care Services: Home health aide  Pt requires continued subacute setting on dc due to minimal support at home  Family  has received options and referrals will be placed today  Is the patient actively participating in therapies?  yes  List any modifications to the treatment plan:     Barriers Interventions   Wake/sleep cycle Sleep log , melatonin adjustment   New onset coughing with spewtum Speech assessing, potential barium swallow study or esophageal study   Lives alone subacute setting on dc             Is the patient making expected progress toward goals? no  List any update or changes to goals:     Medical Goals: Patient will be medically stable for discharge to [facility] upon completion of rehab program and Patient will be able to manage medical conditions and comorbid conditions with medications and follow up upon completion of rehab program    Weekly Team Goals:   Rehab Team Goals  ADL Team Goal: Patient will be independent with ADLs with least restrictive device upon completion of rehab program  Bowel/Bladder Team Goal: Patient will return to premorbid level for bladder/bowel management upon completion of rehab program  Transfer Team Goal: Patient will be independent with transfers with least restrictive device upon completion of rehab program  Locomotion Team Goal: Patient will be independent with locomotion with least restrictive device upon completion of rehab program  Cognitive Team Goal: Patient will be independent for basic  tasks and require supervision for complex tasks upon completion of rehab program    Discussion: pt presents with the above barriers and pt has had a change in cog function and an increase in cough producing flem  Pt continues at mod a to total a for ambulation , mod for transfers  Min a adls  Referrals to be made today for contd subacute setting  Anticipated Discharge Date:  tbd  [facility] Team Members Present: The following team members are supervising care for this patient and were present during this Weekly Team Conference      Physician: Dr Tessa Melendez MD  : AYAKA Kruger  Registered Nurse: Beata Deal RN  Physical Therapist: Patricia Kent DPT  Occupational Therapist: ALEX Farmer  Speech Therapist:

## 2022-09-06 NOTE — PROGRESS NOTES
09/06/22 1230   Pain Assessment   Pain Assessment Tool 0-10   Pain Score 3   Pain Location/Orientation Orientation: Left; Location: Shoulder   Restrictions/Precautions   Precautions Aspiration;Bed/chair alarms;Cognitive; Fall Risk;Pain;Supervision on toilet/commode   Lifestyle   Autonomy "I am ready to try"   Putting On/Taking Off Footwear   Type of Assistance Needed Physical assistance   Physical Assistance Level 51%-75%   Comment pt able to kick off B sneakers, Tan to doff brace  For donning, pt able to don R slip on sneaker w/ exta time, assist to don L AFO and shoe   Putting On/Taking Off Footwear CARE Score 2   Sit to Lying   Type of Assistance Needed Physical assistance   Physical Assistance Level 25% or less   Comment Cues for technique and assist for LLE   Sit to Lying CARE Score 3   Lying to Sitting on Side of Bed   Type of Assistance Needed Physical assistance   Physical Assistance Level 25% or less   Comment Cues for technique and assist for LLE   Lying to Sitting on Side of Bed CARE Score 3   Sit to Stand   Type of Assistance Needed Physical assistance   Physical Assistance Level 26%-50%   Comment Max cues for technique and assist for proper foot placement  Extra time to follow cues  Assist also provided for STS   Sit to Stand CARE Score 3   Bed-Chair Transfer   Type of Assistance Needed Physical assistance   Physical Assistance Level 51%-75%   Comment transfer to L at Mod-max, transfer to R Tan   Chair/Bed-to-Chair Transfer CARE Score 2   Toileting Hygiene   Type of Assistance Needed Physical assistance   Physical Assistance Level 76% or more   Toileting Hygiene CARE Score 2   Toilet Transfer   Type of Assistance Needed Physical assistance   Physical Assistance Level 51%-75%   Toilet Transfer CARE Score 2   Exercise Tools   Exercise Tools Yes   Other Exercise Tool 1 Table neri w/ 2# inserted pt completes 3x20 forward/backward   Attempted to complete other movement patterns but d/t dec strength and coordination in LLE pt struggled  Neuromuscular Education   Comments LUE NMR activity in stance pt requires CGA-Odalis for upright stance  Pt completes fxnl reach laterally and forward to collect pegs and place in targets  Continues w/ mild impairments in LUE but able to complete w/o physical assist to LUE  Assessment   Treatment Assessment OT session focusing on toileting tasks, UE TE, NMR, fxnl activity and transfers  Pt tolerated well  Pt w/ appropriate behaviors t/o todays session  Pt continues to require skilled hands on assist in order to maintain her safety d/t balance, mobility and cog deficits  OT to continue to treat and follow established POC  Problem List Decreased range of motion;Decreased strength;Decreased endurance; Impaired balance;Decreased mobility; Decreased coordination;Decreased cognition; Impaired judgement;Decreased safety awareness; Impaired tone;Pain   Plan   Treatment/Interventions ADL retraining;Functional transfer training; Endurance training; Therapeutic exercise;Cognitive reorientation;Patient/family training; Compensatory technique education;Continued evaluation;Spoke to nursing   Progress Progressing toward goals   OT Therapy Minutes   OT Time In 1230   OT Time Out 1400   OT Total Time (minutes) 90   OT Mode of treatment - Individual (minutes) 90   OT Mode of treatment - Concurrent (minutes) 0   OT Mode of treatment - Group (minutes) 0   OT Mode of treatment - Co-treat (minutes) 0   OT Mode of Treatment - Total time(minutes) 90 minutes   OT Cumulative Minutes 1360   Therapy Time missed   Time missed?  No

## 2022-09-06 NOTE — PROCEDURES
Speech Pathology Videofluoroscopic Swallow Study (VFSS/VBS/MBSS)      Patient Name: Tita Sabillon    HVJSL'O Date: 9/6/2022    Problem List  Principal Problem:    Arterial ischemic stroke, MCA (middle cerebral artery), right, acute ([facility] [address] )  Active Problems:    Other chest pain    Essential hypertension    Hyperlipidemia LDL goal <100    Stage 3 chronic kidney disease (HCC)    Chronic atrial fibrillation (HCC)    Pacemaker    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 ([facility] [address] )    At risk for venous thromboembolism (VTE)    DARRIUS (acute kidney injury) ([facility] [address] )    Spastic hemiparesis of left dominant side as late effect of cerebral infarction ([facility] [address] )    Valvular heart disease    CAD (coronary artery disease)    Urinary retention    Neck pain    Healthcare maintenance    At risk for coping difficulty    Cough      Past Medical History  Past Medical History:   Diagnosis Date    A-fib ([facility] [address] )     Anemia     Arthritis     Breast pain, right     Chest pain on breathing     Colon polyp     Cough     Diverticulosis     Encounter for screening colonoscopy     H/O sick sinus syndrome     Heart murmur     High cholesterol     History of atrial fibrillation     Rate ontrolled  On xarelto 15mg (creatinine 50) Followed by Dr Rose Aguilera with Cardiology     History of weight loss     Report loose fitting clothes  Weight stable (3lbs difference)  Last colo showed small tubular adenoma x2  Breast biopsy last showed fibrocystic changes  TSH wnl  Will check cbc, bmp, spep          Hx of long term use of blood thinners     Hypertension     Irregular heart beat     Pacemaker     Preop examination     Shortness of breath     Viral bronchitis        Past Surgical History  Past Surgical History:   Procedure Laterality Date    BREAST BIOPSY Right 08/17/2006    ultrasound guided Percutaneous Needle Core -Benign    CATARACT EXTRACTION      CATARACT EXTRACTION W/ INTRAOCULAR LENS  IMPLANT, BILATERAL      COLONOSCOPY  COLONOSCOPY N/A 5/22/2018    Procedure: COLONOSCOPY;  Surgeon: Jorje Sanchez DO;  [facility]; Service: Gastroenterology    Alonso Green / George Nielson / Aiyana Frazier Right     as a child after a fall    MAMMO STEREOTACTIC BREAST BIOPSY RIGHT (ALL INC) Right 10/2014    benign    CT CMBND ANTERPOST COLPORRAPHY W/CYSTO N/A 3/17/2016    Procedure: COLPORRHAPHY ANTERIOR POSTERIOR ;  Surgeon: Orie Sicard, MD;  [facility];  Service: Gynecology    CT COLONOSCOPY FLX DX W/[facility] WHEN PFRMD N/A 4/4/2019    Procedure: COLONOSCOPY;  Surgeon: Jorje Sanchez DO;  [facility]; Service: Gastroenterology    CT CYSTOURETHROSCOPY N/A 3/17/2016    Procedure: CYSTOSCOPY;  Surgeon: Orie Sicard, MD;  [facility];  Service: Gynecology    CT ESOPHAGOGASTRODUODENOSCOPY TRANSORAL DIAGNOSTIC N/A 4/16/2018    Procedure: EGD AND COLONOSCOPY;  Surgeon: Jorje Sanchez DO;  [facility]; Service: Gastroenterology    CT LAP,SURG,COLECTOMY, PARTIAL, W/ANAST Right 1/13/2022    Procedure: Diagnostic laparoscopy, laparscopic right colectomy;  Surgeon: Monica Ramos MD;  [facility];  Service: Colorectal    CT REVAGINAL PROLAPSE,SACROSP LIG N/A 3/17/2016    Procedure: COLPOPEXY VAGINAL EXTRAPERITONEAL (VEC) ANTERIOR ;  Surgeon: Orie Sicard, MD;  [facility];  Service: Gynecology    CT SLING OPER STRES INCONTINENCE N/A 3/17/2016    Procedure: INSERTION PUBOVAGINAL SLING SINGLE INCISION ;  Surgeon: Orie Sicard, MD;  [facility];  Service: Gynecology       General Information:  Current concerns for dysphagia include reports of food feeling "stuck," reports of reflux and coughing observed with hard solids at bedside, however, RN staff reports no observed difficulties with meals to include coughing, choking, and throat clearing   Pt also frequently spitting saliva into a basin and reporting "I have to bring it up" while attempting to expel saliva  Pt is able to swallow saliva when prompted to, but at times prefers to expel into basin  A VFSS was recommended to further assess oropharyngeal stage swallowing skills at this time, as well as to guide treatment plan and recommendations  Summary:  Pt presents with overall functional oral and pharyngeal swallow skills given pt's current age and across items assessed this study  Despite mildly reduced oral control of liquids when taken by cup sips, resulting in premature spillage to the level of the pyriforms, pt demonstrated greater oral control with use of straw and maintained airway protection when taken via cup and straw  Overall swallows remained timely with adequate airway protection with intake of all items  Occasional high penetration of thin liquids but no further deeper penetration or aspiration events  All boluses passed through pharynx and esophagus during brief esophageal screen  Pt without any coughing, throat clearing or expelling of saliva  Pt also denied globus sensation or reflux during study  Note: Images are available for review in PACS as desired  Recommendations:  Diet: regular diet and thin liquids  Meds: whole with liquid, as desired  Strategies: upright posture, only feed when fully alert, slow rate of feeding, small bites/sips, quiet environment (tv off, limit talking, door closed, etc ), alternating bites and sips and smaller meals, remain upright for 30-45 min after meals     Set up assistance with meals-distant supervision if pt's reported s/s persist   F/u ST tx: Recommend brief follow up across a meal to provide education on reflux precautions as pt described s/s of reflux occurring at times-but none present during VFSS       Consider consult with:  GI team should esophageal s/s persist (increased belching with meals, globus sensation, reports of reflux)  Results reviewed with:  patient, RN, MD and LILINP   Aspiration precautions posted  Pt was viewed in lateral position and was given trials of pureed (applesauce), soft moist (canned peaches) and solid food (Christianne Doone cookie) as well as thin liquid  A 13mm pill with thin liquid was also administered  Pt was also viewed in AP view for assessment with thin liquid  Oral stage:WFL and minimal  Lip closure: WNL-complete   Bolus Preparation/Mastication: functional, adequate  Bolus formation: mild weakness but functional and effective   Bolus control: mildly reduced with thins, soft and hard solids  Transfer: piecemeal transfers but functional/adequate  Residue: no     Pharyngeal stage:WFL  Swallow promptness: overall timely, occasional mild delay but functional  Spill to valleculae: consistent but WFL  Spill to pyriforms: with consecutive sips of thins by CUP-good control by straw  Epiglottic inversion: complete  Laryngeal excursion: WFL  Pharyngeal constriction: WFL  Vallecular retention: trace  Pyriform retention: trace  PPW coating: none  Epiglottic undercoat: none  Penetration: high flash penetration with thins, occasionally   Aspiration: none present   Response to aspiration: n/a  Strategies used: pt independently used slight neck flexion posture-no observed difference in swallow function in neck flexion vs neutral posture  Osteophytes: possible small at C4-5    Screening of Esophageal stage:  Bolus and pill promptly cleared to stomach  Slight retropulsion of liquid which then cleared to stomach  If evaluation of esophagus is desired, please considered dedicated barium study

## 2022-09-06 NOTE — PLAN OF CARE
Problem: MOBILITY - ADULT  Goal: Maintain or return to baseline ADL function  Description: INTERVENTIONS:  -  Assess patient's ability to carry out ADLs; assess patient's baseline for ADL function and identify physical deficits which impact ability to perform ADLs (bathing, care of mouth/teeth, toileting, grooming, dressing, etc )  - Assess/evaluate cause of self-care deficits   - Assess range of motion  - Assess patient's mobility; develop plan if impaired  - Assess patient's need for assistive devices and provide as appropriate  - Encourage maximum independence but intervene and supervise when necessary  - Involve family in performance of ADLs  - Assess for home care needs following discharge   - Consider OT consult to assist with ADL evaluation and planning for discharge  - Provide patient education as appropriate  Outcome: Progressing  Goal: Maintains/Returns to pre admission functional level  Description: INTERVENTIONS:  - Set and communicate daily mobility goal to care team and patient/family/caregiver  - Collaborate with rehabilitation services on mobility goals if consulted  - Perform Range of Motion 3 times a day  - Reposition patient every 2 hours    - Dangle patient 3 times a day  - Stand patient 3 times a day  - Ambulate patient 3 times a day  - Out of bed to chair 3 times a day   - Out of bed for meals 3 times a day  - Out of bed for toileting  - Record patient progress and toleration of activity level   Outcome: Progressing     Problem: Prexisting or High Potential for Compromised Skin Integrity  Goal: Skin integrity is maintained or improved  Description: INTERVENTIONS:  - Identify patients at risk for skin breakdown  - Assess and monitor skin integrity  - Assess and monitor nutrition and hydration status  - Monitor labs   - Assess for incontinence   - Turn and reposition patient  - Assist with mobility/ambulation  - Relieve pressure over bony prominences  - Avoid friction and shearing  - Provide appropriate hygiene as needed including keeping skin clean and dry  - Evaluate need for skin moisturizer/barrier cream  - Collaborate with interdisciplinary team   - Patient/family teaching  - Consider wound care consult   Outcome: Progressing     Problem: Potential for Falls  Goal: Patient will remain free of falls  Description: INTERVENTIONS:  - Educate patient/family on patient safety including physical limitations  - Instruct patient to call for assistance with activity   - Consult OT/PT to assist with strengthening/mobility   - Keep Call bell within reach  - Keep bed low and locked with side rails adjusted as appropriate  - Keep care items and personal belongings within reach  - Initiate and maintain comfort rounds  - Make Fall Risk Sign visible to staff  - Offer Toileting every 2-4 Hours, in advance of need  - Initiate/Maintain bed/chair alarm  - Obtain necessary fall risk management equipment: nonskid footwear  - Apply yellow socks and bracelet for high fall risk patients  - Consider moving patient to room near nurses station  Outcome: Progressing     Problem: PAIN - ADULT  Goal: Verbalizes/displays adequate comfort level or baseline comfort level  Description: Interventions:  - Encourage patient to monitor pain and request assistance  - Assess pain using appropriate pain scale  - Administer analgesics based on type and severity of pain and evaluate response  - Implement non-pharmacological measures as appropriate and evaluate response  - Consider cultural and social influences on pain and pain management  - Notify physician/advanced practitioner if interventions unsuccessful or patient reports new pain  Outcome: Progressing     Problem: INFECTION - ADULT  Goal: Absence or prevention of progression during hospitalization  Description: INTERVENTIONS:  - Assess and monitor for signs and symptoms of infection  - Monitor lab/diagnostic results  - Monitor all insertion sites, i e  indwelling lines, tubes, and drains  - Monitor endotracheal if appropriate and nasal secretions for changes in amount and color  - [place] appropriate cooling/warming therapies per order  - Administer medications as ordered  - Instruct and encourage patient and family to use good hand hygiene technique  - Identify and instruct in appropriate isolation precautions for identified infection/condition  Outcome: Progressing     Problem: DISCHARGE PLANNING  Goal: Discharge to home or other facility with appropriate resources  Description: INTERVENTIONS:  - Identify barriers to discharge w/patient and caregiver  - Arrange for needed discharge resources and transportation as appropriate  - Identify discharge learning needs (meds, wound care, etc )  - Arrange for interpretive services to assist at discharge as needed  - Refer to Case Management Department for coordinating discharge planning if the patient needs post-hospital services based on physician/advanced practitioner order or complex needs related to functional status, cognitive ability, or social support system  Outcome: Progressing     Problem: Nutrition/Hydration-ADULT  Goal: Nutrient/Hydration intake appropriate for improving, restoring or maintaining nutritional needs  Description: Monitor and assess patient's nutrition/hydration status for malnutrition  Collaborate with interdisciplinary team and initiate plan and interventions as ordered  Monitor patient's weight and dietary intake as ordered or per policy  Utilize nutrition screening tool and intervene as necessary  Determine patient's food preferences and provide high-protein, high-caloric foods as appropriate       INTERVENTIONS:  - Monitor oral intake, urinary output, labs, and treatment plans  - Assess nutrition and hydration status and recommend course of action  - Evaluate amount of meals eaten  - Assist patient with eating if necessary   - Allow adequate time for meals  - Recommend/ encourage appropriate diets, oral nutritional supplements, and vitamin/mineral supplements  - Order, calculate, and assess calorie counts as needed  - Recommend, monitor, and adjust tube feedings and TPN/PPN based on assessed needs  - Assess need for intravenous fluids  - Provide specific nutrition/hydration education as appropriate  - Include patient/family/caregiver in decisions related to nutrition  Outcome: Progressing

## 2022-09-06 NOTE — PROGRESS NOTES
Internal Medicine Progress Note  Patient: Chalino Trujillo  Age/sex: 80 y o  female  Medical Record #: [id number]      ASSESSMENT/PLAN: (Interval History)  Chalino Trujillo is seen and examined and management for following issues:    Acute embolic right MCA CVA  · Had near occlusion of right MCA  · Felt to be a Xarelto failure and was switched to Pradaxa  · ECHO w/o thrombus  · Continue statin  · On Baclofen for tone = much improved   · On 9/5/22 AM, had c/o "saying that word again"  This occurred 9/2  She will repeat a particular word that is not really a word and she knows it  She speaks fluently about it at the same time  Her neuro exam has been unchanged except for her c/o inc tone left leg  Dr Vonnie Carson d/w Neuro and felt sx don't seem to be neurological = med related?     Chronic atrial fibrillation  · Sees Dr Manish Walters as OP  · Rates controlled  · Cont Pradaxa/Bystolic     PPM  · VVI  · Is not MRI conditional  · 100% paced on EKG     Valvular disease  · Current ECHO:  LVEF 65%, mod AR/mild AS, mod TR with severely elevated RV systolic pressure, mild-mod MR, mod LAE/mild NEO  · Euvolemic currently     HTN  · Home:  Verapamil 240mg qhs/Cozaar 927 mg qd/Bystolic 5mg qd/lasix 38LS qd/Aldactone 25mg qd  · Here:  Verapamil 240mg qhs/Cozaar 26XZ qd/Bystolic 5mg qd  · OP note states has left RA stenosis but no testing in OP records or Care Everywhere to verify  · BP gets a bit soft at times so starting 9/6, reduced Cozaar to 50mg qd     HLD  · Home:  Crestor 10mg qd = Neuro rec when discharged to reduce to 5mg qd  · Here:  Pravachol 40mg qd     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  · S/p NS 1 liter   · Back to baseline but GFR is in 40's  = as OP GFR 50's to 60's    GFR here 40's to 60's  · Continue to hold Lasix and Aldactone for now     CAD  · Nonobstructive  · Card cath 8/2021 done for borderline abn stress test and found 50% mid RCA/40% mid LAD = no intervention done  · Continue statin  · Was not on ASA as an OP     Hypomagnesemia  · Cont 400 mg MagOx  · stable     Left chest pain  · Had left neck pain (not new for her) then pain under left breast 8/31/22 = reproducible with palpation  · Cardiac w/u negative  · Etio was her left arm spasticity   · Seems to be developing odd behaviors over her time here in [facility] so Baclofen reduced 9/5 to 5mg TID      Cough   · CXR negative 8/25  · Has scheduled Tessalon perles 200mg TID/Chlorarseptic spray/pepcid/Mucinex  · Started z-pack 9/2 in case has tracheobronchitis which she had a x3 days and stopped when Keflex started by PMR  · Flonase was not effective  · Cozaar likely not the cause since has been on for a long time  · Says feels like something in throat and constantly spits out her saliva = per ST has no previous issues eating/drinking but since no improvement --> D/W Dr Bryant Rodrigues and ST --> for VBS  · May have some thrush as below    Possible thrush  · On tongue patel have some mild thrush  · If so, then could have some esophagitis from it as well  · Will empirically tx with Mycostatin      Urine retention  · Getting str cath'd  · Management per PMR  · Flomax added on 8/31/22  · Urine culture is >60,000-69,000 proteus = no sx     Vaginal yeast infection  · S/p Monistat pack/Diflucan 150mg x 1         DC planning: Reteam       The above assessment and plan was reviewed and updated as determined by my evaluation of the patient on 9/6/2022      Labs:   Results from last 7 days   Lab Units 09/05/22  0428 09/02/22  1549   WBC Thousand/uL 5 88 8 19   HEMOGLOBIN g/dL 10 7* 11 3*   HEMATOCRIT % 34 5* 34 6*   PLATELETS Thousands/uL 227 269     Results from last 7 days   Lab Units 09/05/22  0428 09/02/22  1549   SODIUM mmol/L 137 134*   POTASSIUM mmol/L 4 0 4 0   CHLORIDE mmol/L 107 106   CO2 mmol/L 24 22   BUN mg/dL 29* 26*   CREATININE mg/dL 1 12 1 14   CALCIUM mg/dL 9 2 9 2                   Review of Scheduled Meds:  Current Facility-Administered Medications   Medication Dose Route Frequency Provider Last Rate    acetaminophen  650 mg Oral Q6H PRN Benny Melendez MD      baclofen  5 mg Oral TID Naty Vera,       benzonatate  200 mg Oral TID TOMY Yee      bisacodyl  10 mg Rectal Daily PRN Benny Melendez MD      cephalexin  500 mg Oral Q6H [address] Naty Vera DO      dabigatran etexilate  150 mg Oral Q12H [address] Benny Melendez MD      famotidine  20 mg Oral Daily Geovanna Suazo MD      guaiFENesin  600 mg Oral Q12H [address] TOMY Major      losartan  50 mg Oral Daily TOMY Major      magnesium oxide  400 mg Oral Daily Benny Melendez MD      melatonin  6 mg Oral QPM Naty Vera DO      menthol-methyl salicylate   Apply externally BID Geovanna Suazo MD      nebivolol  5 mg Oral Daily Benny Melendez MD      nitroglycerin  0 4 mg Sublingual Q5 Min PRN TOMY Major      phenol  1 spray Mouth/Throat Q2H PRN Geovanna Suazo MD      polyethylene glycol  17 g Oral Daily PRN Benny Melendez MD      pravastatin  40 mg Oral Daily With Martin Tolbert MD      tamsulosin  0 4 mg Oral Daily With Deneen Prieto MD      verapamil  240 mg Oral HS Benny Melendez MD         Subjective/ HPI: Patient seen and examined  Patients overnight issues or events were reviewed with nursing or staff during rounds or morning huddle session  New or overnight issues include the following:     No new or overnight issues  Constantly spitting out her saliva  The frequency has gradually increased over 2 weeks time    ROS:   A 10 point ROS was performed; negative except as noted above       *Labs /Radiology studies reviewed  *Medications reviewed and reconciled as needed  *Please refer to order section for additional ordered labs studies  *Case discussed with primary attending during morning huddle case rounds    Physical Examination:  Vitals:   Vitals:    09/05/22 0835 09/05/22 1309 09/06/22 0519 09/06/22 0848   BP: 123/65 114/69 99/51 131/77   BP Location: Right arm Left arm Right arm Right arm   Pulse: 72 60 62 68   Resp:  18 18 18   Temp:  98 4 °F (36 9 °C) 97 8 °F (36 6 °C)    TempSrc:  Oral Oral    SpO2:  97% 96%    Weight:       Height:           General Appearance: no distress, conversive  HEENT: PERRLA, conjuctiva normal; oropharynx clear; mucous membranes moist   Neck:  Supple, normal ROM  Lungs: CTA, normal respiratory effort, no retractions, expiratory effort normal  CV: regular rate and rhythm; no rubs/murmurs/gallops, PMI normal   ABD: soft; ND/NT; +BS  EXT: no edema  Skin: normal turgor, normal texture, no rashes  Psych: affect normal, mood normal  Neuro: AA      The above physical exam was reviewed and updated as determined by my evaluation of the patient on 9/6/2022  Invasive Devices  Report    None                    VTE Pharmacologic Prophylaxis: Pradaxa  Code Status: Level 1 - Full Code  Current Length of Stay: 18 day(s)      Total time spent:  30 minutes with more than 50% spent counseling/coordinating care  Counseling includes discussion with patient re: progress  and discussion with patient of his/her current medical state/information  Coordination of patient's care was performed in conjunction with primary service  Time invested included review of patient's labs, vitals, and management of their comorbidities with continued monitoring  In addition, this patient was discussed with medical team including physician and advanced extenders  The care of the patient was extensively discussed and appropriate treatment plan was formulated unique for this patient  ** Please Note:  voice to text software may have been used in the creation of this document   Although proof errors in transcription or interpretation are a potential of such software**

## 2022-09-07 ENCOUNTER — APPOINTMENT (INPATIENT)
Dept: RADIOLOGY | Facility: HOSPITAL | Age: 82
DRG: 057 | End: 2022-09-07
Payer: MEDICARE

## 2022-09-07 LAB
ALBUMIN SERPL BCP-MCNC: 3.3 G/DL (ref 3.5–5)
ALP SERPL-CCNC: 73 U/L (ref 46–116)
ALT SERPL W P-5'-P-CCNC: 21 U/L (ref 12–78)
ANION GAP SERPL CALCULATED.3IONS-SCNC: 7 MMOL/L (ref 4–13)
AST SERPL W P-5'-P-CCNC: 20 U/L (ref 5–45)
BASOPHILS # BLD AUTO: 0.02 THOUSANDS/ΜL (ref 0–0.1)
BASOPHILS NFR BLD AUTO: 0 % (ref 0–1)
BILIRUB SERPL-MCNC: 0.48 MG/DL (ref 0.2–1)
BUN SERPL-MCNC: 17 MG/DL (ref 5–25)
CALCIUM ALBUM COR SERPL-MCNC: 10 MG/DL (ref 8.3–10.1)
CALCIUM SERPL-MCNC: 9.4 MG/DL (ref 8.3–10.1)
CHLORIDE SERPL-SCNC: 106 MMOL/L (ref 96–108)
CO2 SERPL-SCNC: 23 MMOL/L (ref 21–32)
CREAT SERPL-MCNC: 0.92 MG/DL (ref 0.6–1.3)
EOSINOPHIL # BLD AUTO: 0.08 THOUSAND/ΜL (ref 0–0.61)
EOSINOPHIL NFR BLD AUTO: 1 % (ref 0–6)
ERYTHROCYTE [DISTWIDTH] IN BLOOD BY AUTOMATED COUNT: 13.9 % (ref 11.6–15.1)
GFR SERPL CREATININE-BSD FRML MDRD: 58 ML/MIN/1.73SQ M
GLUCOSE SERPL-MCNC: 85 MG/DL (ref 65–140)
HCT VFR BLD AUTO: 35 % (ref 34.8–46.1)
HGB BLD-MCNC: 11.5 G/DL (ref 11.5–15.4)
IMM GRANULOCYTES # BLD AUTO: 0.04 THOUSAND/UL (ref 0–0.2)
IMM GRANULOCYTES NFR BLD AUTO: 1 % (ref 0–2)
LYMPHOCYTES # BLD AUTO: 1.19 THOUSANDS/ΜL (ref 0.6–4.47)
LYMPHOCYTES NFR BLD AUTO: 17 % (ref 14–44)
MCH RBC QN AUTO: 30.4 PG (ref 26.8–34.3)
MCHC RBC AUTO-ENTMCNC: 32.9 G/DL (ref 31.4–37.4)
MCV RBC AUTO: 93 FL (ref 82–98)
MONOCYTES # BLD AUTO: 0.73 THOUSAND/ΜL (ref 0.17–1.22)
MONOCYTES NFR BLD AUTO: 10 % (ref 4–12)
NEUTROPHILS # BLD AUTO: 5.1 THOUSANDS/ΜL (ref 1.85–7.62)
NEUTS SEG NFR BLD AUTO: 71 % (ref 43–75)
NRBC BLD AUTO-RTO: 0 /100 WBCS
PLATELET # BLD AUTO: 237 THOUSANDS/UL (ref 149–390)
PMV BLD AUTO: 10.5 FL (ref 8.9–12.7)
POTASSIUM SERPL-SCNC: 4.1 MMOL/L (ref 3.5–5.3)
PROT SERPL-MCNC: 7.7 G/DL (ref 6.4–8.4)
RBC # BLD AUTO: 3.78 MILLION/UL (ref 3.81–5.12)
SODIUM SERPL-SCNC: 136 MMOL/L (ref 135–147)
TSH SERPL DL<=0.05 MIU/L-ACNC: 3.32 UIU/ML (ref 0.45–4.5)
WBC # BLD AUTO: 7.16 THOUSAND/UL (ref 4.31–10.16)

## 2022-09-07 PROCEDURE — 99223 1ST HOSP IP/OBS HIGH 75: CPT | Performed by: STUDENT IN AN ORGANIZED HEALTH CARE EDUCATION/TRAINING PROGRAM

## 2022-09-07 PROCEDURE — 97530 THERAPEUTIC ACTIVITIES: CPT

## 2022-09-07 PROCEDURE — 97112 NEUROMUSCULAR REEDUCATION: CPT

## 2022-09-07 PROCEDURE — 97110 THERAPEUTIC EXERCISES: CPT

## 2022-09-07 PROCEDURE — 97535 SELF CARE MNGMENT TRAINING: CPT

## 2022-09-07 PROCEDURE — 99232 SBSQ HOSP IP/OBS MODERATE 35: CPT | Performed by: INTERNAL MEDICINE

## 2022-09-07 PROCEDURE — 80053 COMPREHEN METABOLIC PANEL: CPT

## 2022-09-07 PROCEDURE — 97129 THER IVNTJ 1ST 15 MIN: CPT

## 2022-09-07 PROCEDURE — 84443 ASSAY THYROID STIM HORMONE: CPT

## 2022-09-07 PROCEDURE — 85025 COMPLETE CBC W/AUTO DIFF WBC: CPT

## 2022-09-07 PROCEDURE — 97140 MANUAL THERAPY 1/> REGIONS: CPT

## 2022-09-07 PROCEDURE — 99233 SBSQ HOSP IP/OBS HIGH 50: CPT

## 2022-09-07 PROCEDURE — 70450 CT HEAD/BRAIN W/O DYE: CPT

## 2022-09-07 PROCEDURE — G1004 CDSM NDSC: HCPCS

## 2022-09-07 RX ADMIN — BACLOFEN 5 MG: 10 TABLET ORAL at 20:09

## 2022-09-07 RX ADMIN — PRAVASTATIN SODIUM 40 MG: 40 TABLET ORAL at 16:12

## 2022-09-07 RX ADMIN — PANTOPRAZOLE SODIUM 40 MG: 40 TABLET, DELAYED RELEASE ORAL at 05:25

## 2022-09-07 RX ADMIN — MAGNESIUM OXIDE TAB 400 MG (241.3 MG ELEMENTAL MG) 400 MG: 400 (241.3 MG) TAB at 09:33

## 2022-09-07 RX ADMIN — BACLOFEN 5 MG: 10 TABLET ORAL at 16:12

## 2022-09-07 RX ADMIN — NYSTATIN 500000 UNITS: 100000 SUSPENSION ORAL at 16:12

## 2022-09-07 RX ADMIN — TAMSULOSIN HYDROCHLORIDE 0.4 MG: 0.4 CAPSULE ORAL at 16:11

## 2022-09-07 RX ADMIN — CEPHALEXIN 500 MG: 500 CAPSULE ORAL at 23:39

## 2022-09-07 RX ADMIN — MENTHOL, METHYL SALICYLATE: 10; 15 CREAM TOPICAL at 17:51

## 2022-09-07 RX ADMIN — GUAIFENESIN 600 MG: 600 TABLET, EXTENDED RELEASE ORAL at 09:33

## 2022-09-07 RX ADMIN — BENZONATATE 200 MG: 100 CAPSULE ORAL at 20:09

## 2022-09-07 RX ADMIN — CEPHALEXIN 500 MG: 500 CAPSULE ORAL at 05:25

## 2022-09-07 RX ADMIN — Medication 6 MG: at 20:08

## 2022-09-07 RX ADMIN — MENTHOL, METHYL SALICYLATE: 10; 15 CREAM TOPICAL at 10:52

## 2022-09-07 RX ADMIN — ACETAMINOPHEN 650 MG: 325 TABLET ORAL at 20:13

## 2022-09-07 RX ADMIN — DABIGATRAN ETEXILATE MESYLATE 150 MG: 150 CAPSULE ORAL at 09:33

## 2022-09-07 RX ADMIN — VERAPAMIL HYDROCHLORIDE 240 MG: 240 TABLET ORAL at 21:06

## 2022-09-07 RX ADMIN — NYSTATIN 500000 UNITS: 100000 SUSPENSION ORAL at 21:06

## 2022-09-07 RX ADMIN — ACETAMINOPHEN 650 MG: 325 TABLET ORAL at 00:29

## 2022-09-07 RX ADMIN — NYSTATIN 500000 UNITS: 100000 SUSPENSION ORAL at 10:51

## 2022-09-07 RX ADMIN — CEPHALEXIN 500 MG: 500 CAPSULE ORAL at 17:51

## 2022-09-07 RX ADMIN — BACLOFEN 5 MG: 10 TABLET ORAL at 09:33

## 2022-09-07 RX ADMIN — BENZONATATE 200 MG: 100 CAPSULE ORAL at 09:33

## 2022-09-07 RX ADMIN — NEBIVOLOL 5 MG: 5 TABLET ORAL at 10:52

## 2022-09-07 RX ADMIN — DABIGATRAN ETEXILATE MESYLATE 150 MG: 150 CAPSULE ORAL at 20:10

## 2022-09-07 RX ADMIN — LOSARTAN POTASSIUM 50 MG: 50 TABLET, FILM COATED ORAL at 09:33

## 2022-09-07 RX ADMIN — GUAIFENESIN 600 MG: 600 TABLET, EXTENDED RELEASE ORAL at 20:09

## 2022-09-07 RX ADMIN — BENZONATATE 200 MG: 100 CAPSULE ORAL at 16:11

## 2022-09-07 RX ADMIN — NYSTATIN 500000 UNITS: 100000 SUSPENSION ORAL at 13:18

## 2022-09-07 RX ADMIN — CEPHALEXIN 500 MG: 500 CAPSULE ORAL at 12:00

## 2022-09-07 NOTE — ASSESSMENT & PLAN NOTE
79 y/o female with Afib on Pradaxa, arthritis, hx SSS, s/p pacemaker placement (not MRI compatible), HTN, HLD, CAD, who initially presented to [facility] on 8/12 for left sided weakness, left facial droop, and dysarthria  Imaging revealed near occlusive thrombus at right MCA bifurcation  Patient was not a TNK candidate due to anticoagulation use and was also deemed not a thrombectomy candidate  Imaging demonstrated embolic right MCA stroke  Etiology suspected to be secondary to Xarelto failure in the setting of Afib  Patient was transitioned to Pradaxa for anticoagulation  She was discharged to [facility] on 8/19  Neurology asked to re-evaluate patient as she has been reporting episodes of: head shaking, intermittent nonsensical word substitution, atypical frequent rapid blinking, increased tremulousness  She was noted to have episodes on 9/2, 9/5, and overnight 9/6-9/7  In discussion with patient, she does report maintaining awareness throughout the episode and is able to recall the episodes  Unclear etiology at this time  Suspect possible behavioral component  Low suspicion for seizure at this time  Routine EEG completed and without epileptiform discharges or electrographic seizures noted  Repeat CTH demonstrated "mildly improved gray-white matter differentiation right MCA territory compared to August 15"  Recent urine culture positive for Proteus mirabilis  On exam, patient has left sided weakness (residual from recent stroke)  Plan:  - Continue Pradaxa 150 BID  - Currently Keflex for UTI and Baclofen 5 mg TID  - Neuropsych following  - PT/OT  - Delirium precautions  - Promote appropriate sleep-wake cycle  - Monitor neuro exam; notify with any changes  - Medical management and supportive care per primary team  Correction of any metabolic or infectious disturbances    - No further inpatient neurology recommendations at this time  Please call with any questions

## 2022-09-07 NOTE — CASE MANAGEMENT
[facility], [facility] all denied admission due to no bed availability  Cm to phone son as referrals had not been made to [facility] which is the only other facility in network with pts insurance  Cm secured list of in network providers and expanded the search, referrals made to [employer], new [address]  Awaiting determination  Clinical update faxed via [employer] to Courtney Tirado at [facility] [phone]  Awaiting determination  [facility] has accepted patient pending a covid booster  Cm informed dr Gregory Strickland who stated he is consulting neurology for her change in mental status  Cm left mss for pts son with update and asked for a call back

## 2022-09-07 NOTE — ASSESSMENT & PLAN NOTE
Improved - Nystatin per IM for possible mild thrush on tongue and risk of esophageal involvement completed

## 2022-09-07 NOTE — PROGRESS NOTES
Physical Medicine and Rehabilitation Progress Note  Ck Espionza 80 y o  female MRN: [id number]  Unit/Bed#: -01 Encounter: [id number]      Assessment & Plan:        * Arterial ischemic stroke, MCA (middle cerebral artery), right, acute [facility])  Assessment & Plan  - 9/7 Neuro exam remains stable/unchanged except some slight increased LLE clonus - speech stable on my eval however patient report several hours of head shaking overnight, intermittent nonsensical single word-substitution, atypical frequent blinking, and some increased tremulousness/shakiness    - Patient reports overnight she had episode of head shaking and saying a word that did not make sense in English or Pashto (apparently has had a few additional episodes of this since admission);  She states she took medicine that helped a little better but lasted a few hours and was concerning to her; no nurse reports of concerning symptoms or behavior overnight; spoke to patient's son by phone and he stated when he saw her yesterday she was doing well and when he spoke to her by phone overnight she was ok but he is concerned about her recent complaints   - Son came in and reviewed other patient recent symptoms - patient also reporting intermittent rapid blinking without change clear change in consciousness, some increased tremulousness/somewhat increased LLE clonus today per therapy (baclofen recently reduced slightly); poor sleep overnight    - Recommend neuro c/s for complaints of recent head shaking and possible intermittent word substitution like episodes, pt/family concerns   - Continue neuro checks and if clear neuro changes obtain follow-up imaging in interim    - Optimal sleep, hydration, and medical mgmt - obtain CBC, CMP, TSH  - 9/6 - neuro exam stable again - no complaints  - 9/5 - noted transient episode of saying that one non-sensical word at times but otherwise speech was completely unremarkable and unchanged neuro exam; pt seen by IM and PMR and case was discussed with neuro who felt majority of presentation not neurology and may be more behavioral or med related with perhaps some word substitution at times but not requiring additional work-up; baclofen was decreased 5mg TID   - 9/2 - transient episode of patient repeating a word she didn't know meaning of but on IM eval patient was conversing fluently except occasionally transiently saying 1 nonsensical word in midst of normal rest of sentence (was trying to say Bernetta Pu but got out non-sensical word); remainder of neuro exam unremarkable otherwise; vitals stable  - Intermittent atypical spitting - none last 2 days on my eval    - Slow functional recovery - likely will need more extended IP rehab course - patient will need to be higher level prior to returning to community   - R MCA CVA   - CTA head/neck - near occlusive thrombus at the R MCA bifurcation, mild beading of the mid to distal ICAs suspicious for mild fibromuscular dysplasia, and mild atherosclerotic disease at bilateral distal carotid arteries     - no MRI because pacemaker not compatible    - Residual impairments on admission to [facility]C: L spastic hemiparesis, bowel/bladder dysfunction, possible cog impairments (assess for other impairments during ARC course)     Secondary stroke prevention  - repeat CT angio head and neck in 3 months to re-evaluate thrombus is a outpatient  - Antithrombotic: Pradaxa (switched from Xarelto for possible failure)   - Statin  - Optimal management of blood pressure and diabetes  - Patient at increased risk for stroke particularly early in post-stroke period - monitor neuro exam closely with low threshold to repeat imaging  -  patient and if applicable caregiver on optimal stroke management  - Follow-up with neurology and PCP after d/c   - Continue acute comprehensive interdisciplinary inpatient rehabilitation to include intensive skilled therapies (PT, OT, ST) as outlined with oversight and management by rehabilitation physician as well as inpatient rehab level nursing, case management and weekly interdisciplinary team meetings  - patient does have fair left hand and wrist strength; can use resting hand splint for a few hours during the day and overnight but to not overuse and encourage use hand and wrist  - multi Podus boot on left foot patient can breaks to potentially decrease risk of contracture/skin breakdown   - patient has spastic left upper extremity making subluxation less likely although continue subluxation precautions      Spastic hemiparesis of left dominant side as late effect of cerebral infarction [facility])  Assessment & Plan  Fairly significant   Left upper extremity significantly (also some L neck tightness/spasms)  Modified Sada scale 3 in shoulder, and 2 in her elbow  - Has flexion synergy    - Has clonus in pronators and wrist flexors        Baclofen 5mg TID decreased on 9/5 from QID with possible intolerance - ideally would like to avoid decreasing further given I think it is helping her spasticity/muscle tightness; she has adequate wakefulness and does not appear encephalopathic     Prior PMR provider was consider IP botox injections > will need OP follow-up   Gentle range of motion with therapy  Patient does have movement of the hand and wrist and do want to encourage that - can still consider resting hand splint intermittently  Skilled PT OT  Optimal skin hygiene    At risk for delirium  Assessment & Plan  - Neuro exam stable 9/6-7, patient orientation remains intact  - Possible transient episodes of atypical nonsensical word substitution but does not appear overly sedated or encephalopathic; she did sleep poorly overnight   - Vitals, labs stable   - continue to monitor closely  - Apparently impaired sleep on melatonin 6mg HS > continue for now   - Recommend sleep log  - Baclofen recently decreased to TID > adjust if needed   - Limit sedating meds when possible/appropriate     Neck pain  Assessment & Plan  Stable   With taut muscle bands in left cervical paraspinal and trapezius areas  Gentle stretching  Baclofen 5 mg TID   Monitor closely given GFR  Bengay helpful  Heat not helpful  Manual therapies/modalities helpful     PRN APAP    Urinary retention  Assessment & Plan  Improved 9/7   Required SC x1 9/6 with elevated scan but output was only 350; did have SC 9/4 of 550   - Continue Keflex for 7 day course per chart review and discussion with IM (UA+, 60-60K Proteus (not technically UTI but recommended to treat recently by on-call PMR as this is stone forming organism, also in context of ongoing urinary dysfunction, recent AMS, UTI risk)  - Continue recently started flomax > may need contreras if does not improve prior to d/c   - Continue bladder scan Q4H, monitor output and PVRs, straight cath >450 or if uncomfortable 250-449  - If high SC's in spite of protocol recommend inserting indwelling contreras for bladder rest and future voiding trial  - monitor for retention, incontinence, signs/symptoms of UTI  - ensure optimal bowel management > p/w significant constipation  - recommend toileting program Q2-4 H during day and Q4-6H overnight       DARRIUS (acute kidney injury) [facility])  Assessment & Plan  Remains resolved   Medicine consulted  Hold Lasix and Aldactone - likely decreased intake in setting of CVA   Resume diuretics at discretion of medicine  Monitor labs and output    Cough  Assessment & Plan  9/7 - not significant on my exam; lungs remain CTA; pt afebrile   VBS 9/6   - Per SLP - swallow looked good, no aspiration > continue Regular diet   - Patient stated things come up sometimes (but not seen on VBS) - when they panned down esophagus very small amounts of liquid retropulsed a little but then cleared down stomach   - Not significant during my encounter on 9/6  Spits out at times and feels like something in throat; intermittent atypical coughing at times  Lung exam remains clear  Saturating well on RA, patient afebrile, without leukocytosis  - Lung exam fairly clear  - CXR 8/25 negative   - Tessalon Perles TID, PRN Robitussin  - PPI for GERD (patient had been on H2 blocker not PPI and we switched today to see if that could help)   - Comgmt with internal medicine   Nystatin per IM for possible mild thrush on tongue and risk of esophageal involvement  - Low threshold to repeat CXR, risk of aspiration but on regular diet per SLP still - IM feels not needed and lung exam unremarkable on my eval  - Did have some consideration for tracheobronchitis earlier and she had 3 days of Azithromycin but IM does not feel this is etiology   - If needed OP GI follow-up         Thrush  Assessment & Plan  Nystatin per IM for possible mild thrush on tongue and risk of esophageal involvement    At risk for coping difficulty  Assessment & Plan  Adjustment Disorder with Mixed Emotional Features per psych   Supportive counseling  Psychology consult while in [facility]  Discharged on magnesium daily   Continue for now  Monitor level       CAD (coronary artery disease)  Assessment & Plan  IM consulted and with overall management at their discretion during ARC course  Antithrombotic: Pradaxa  Statin  Optimal blood pressure   Outpatient Cardiology follow-up    Per IM  · "Nonobstructive  · Card cath 8/2021 done for borderline abn stress test and found 50% mid RCA/40% mid LAD = no intervention done  · Continue statin  · Was not on ASA as an OP"       Valvular heart disease  Assessment & Plan  Per IM  · "Current ECHO:  LVEF 65%, mod AR/mild AS, mod TR with severely elevated RV systolic pressure, mild-mod MR, mod LAE/mild NEO  · Euvolemic currently but will watch since CTA on admission showed pulm edema and b/l pleural effusions"  -diuretics at their discretion   -Outpatient cardiology follow-up    At risk for venous thromboembolism (VTE)  Assessment & Plan  SCDs, ambulation, and Pradaxa Pacemaker  Assessment & Plan  Not MRI compatible    Chronic atrial fibrillation (HCC)  Assessment & Plan  IM consulted and with overall management at their discretion during ARC course  Rate control: Verapamil, Nebivolol   Antithrombotic: Pradaxa       Stage 3 chronic kidney disease [facility])  Assessment & Plan  Lab Results   Component Value Date    EGFR 45 09/05/2022    EGFR 44 09/02/2022    EGFR 50 09/01/2022    CREATININE 1 12 09/05/2022    CREATININE 1 14 09/02/2022    CREATININE 1 04 09/01/2022     Stable   Given IVF earlier in course   Avoid nephrotoxic meds  Monitor voiding status  IM consulted  Hyperlipidemia LDL goal <100  Assessment & Plan  Statin    Essential hypertension  Assessment & Plan  Internal medicine consulted and co-management with their service  Monitor vitals with and without activity; monitor for orthostasis  Monitor hemoglobin, electrolytes, kidney function, hydration status   Current meds:  Losartan, nebivolol, Verapamil 240mg daily  - Lasix and Aldactone on hold      Other chest pain  Assessment & Plan  Denies CP currently - remains resolved 9/6     Per prior provider  "8/25 with chest pain, ultimately felt to be 2/2 to spasticity  Improved with baclofen/robaxin  - delta hsTrop at 2 hr was -1    - EKG showed paced rhythm, no evidence of ischemia   - CXR unremarkable   - 30 min later had another episode that felt more like pressure  Rapid called, but not concerning for ACS  Generally reproducible   - Increased Baclofen and frequency of Margyves Shipmancks  Has a cough with it, and lungs sound good, and CXR unremarkable  - May also be component of GERD  Started on pepcid  Also on throat spray  Fluticasone was irritating and didn't make a difference    - Had SLP eval swallow - which was fine"      Other Medical Issues:   None    Follow-up providers and other issues to be followed up after discharge  PCP   Neurology   Cardiology    CODE: Level 1: Full Code    Restrictions include:   Fall precautions    Objective: Allergies per EMR  Diagnostic Studies: Reviewed, no new imaging  FL barium swallow video w speech   Final Result by ARTEMIO GONZALEZ (09/06 1551)      XR chest portable   Final Result by Anshul Everett MD (08/25 0668)      No acute cardiopulmonary disease                    Workstation performed: [id number]           See above as well    Laboratory: Labs reviewed  Results from last 7 days   Lab Units 09/05/22  0428 09/02/22  1549 09/01/22  0654   HEMOGLOBIN g/dL 10 7* 11 3* 11 7   HEMATOCRIT % 34 5* 34 6* 36 8   WBC Thousand/uL 5 88 8 19 6 92     Results from last 7 days   Lab Units 09/05/22  0428 09/02/22  1549 09/01/22  0654   BUN mg/dL 29* 26* 28*   POTASSIUM mmol/L 4 0 4 0 4 3   CHLORIDE mmol/L 107 106 106   CREATININE mg/dL 1 12 1 14 1 04              Drug regimen reviewed, all potential adverse effects identified and addressed:    Scheduled Meds:  Current Facility-Administered Medications   Medication Dose Route Frequency Provider Last Rate    acetaminophen  650 mg Oral Q6H PRN Jason Forte MD      baclofen  5 mg Oral 4x Daily Rich Soto MD      benzonatate  200 mg Oral TID Frazier Litten, CRNP      bisacodyl  10 mg Rectal Daily PRN Jason Forte MD      dabigatran etexilate  150 mg Oral Q12H [facility] Jason Forte MD      famotidine  20 mg Oral Daily Rich Soto MD      guaiFENesin  600 mg Oral Q12H [facility] TOMY Hwang      losartan  100 mg Oral Daily Jason Forte MD      magnesium oxide  400 mg Oral Daily Jason Forte MD      melatonin  3 mg Oral QPM Rich Soto MD      menthol-methyl salicylate   Apply externally BID Rich Soto MD      miconazole   Topical BID PRN Frazier Litten, CRNP      nebivolol  5 mg Oral Daily Jason Forte MD      nitroglycerin  0 4 mg Sublingual Q5 Min PRN TOMY Perry      phenol  1 spray Mouth/Throat Q2H PRN Rich Soto MD      polyethylene glycol  17 g Oral Daily PRN Neelima Nina MD      pravastatin  40 mg Oral Daily With Girma Muller MD      tamsulosin  0 4 mg Oral Daily With Lora Hitchcock MD      verapamil  240 mg Oral HS Neelima Nina MD         Chief Complaints:  Head shaking overnight     Subjective:     Patient reports overnight she had episode of head shaking and saying a word that did not make sense in [place]  She states she took a medication that helped a little but continued for several hours until it resolved  Today she reported episode of frequent blinking and apparently had this in past with some transient blurred vision  Patient notes being a little more shaky today as well  She and son who works here are concerned about this episodes and would like neuro eval   She had poor sleep overnight  Patient denies current visual changes, headache, worsening strength, sensation, bowel/bladder problems, SOB, increased cough, or other new complaints  ROS: A 10 point ROS was performed; negative except as noted above         Physical Exam:  Vitals:    09/07/22 1329   BP: 120/65   Pulse: 62   Resp: 20   Temp: 98 1 °F (36 7 °C)   SpO2: 96%       GEN:  Lying in bed in NAD   HEENT/NECK: MMM  CARDIAC: Regular rate rhythm, no murmers, no rubs, no gallops  LUNGS:  clear to auscultation, no wheezes, rales, or rhonchi  ABDOMEN: Soft, non-tender, non-distended, normal active bowel sounds  EXTREMITIES/SKIN:  no calf edema, no calf tenderness to palpation  NEURO:   MENTAL STATUS: awake, oriented to person, place, time, and situation, MENTAL STATUS:  Appropriate wakefulness and interaction , CN II-XII: Mild L facial weakness otherwise intact, Strength/MMT:  LUE prox 3-, distal 4, LLE prox 2, distal trace, R sided 5/5; FTN intact  and No clear significant word substitutions, significant blinking  PSYCH:  Affect:  Slightly concerned    HPI:  63-year-old female with a past medical history of AFib recently on Xarelto, sick sinus syndrome status status post pacemaker placement that is not MRI compatible, hypertension, hyperlipidemia, valvular disease, coronary artery disease developed dense left-sided weakness, dysarthria and presented to the hospital on 08/12/2022  She had systolic blood pressure of 200 on arrival   CT head showed focal area of gliosis within the high right post central gyrus and scattered chronic microvascular ischemic changes with more focal lucency along the anterior limb of the right internal capsule  CTA of head and neck showed near occlusive thrombus at the right MCA bifurcation, mild beating of the mid to distal ICA suspicious mild fibromuscular dysplasia and mild atherosclerotic disease at the bilateral distal carotid arteries  She was not a candidate for MRI due to pacemaker  CT cerebral perfusion study showed 8 mL of mismatch  She was not a candidate for tPA due to bleeding risk from being on Xarelto  She was not a thrombectomy candidate for neurointerventional   Patient was recommended to transition to Pradaxa  Patient was evaluated by skilled therapies and was found to have significant decline in ADLs and ambulation and appears [facility]  ** Please Note: Fluency Direct voice to text software may have been used in the creation of this document  **    55 minutes or greater spent face-to-face with patient and son during this encounter and with coordination of care    Extended discussion today held with patient regarding current condition, medical history, mood, medical/rehabilitation management, and disposition and management with IM and neuro

## 2022-09-07 NOTE — PROGRESS NOTES
NEUROPSYCHOLOGY  CLINICAL PROGRESS NOTE   Jelena Avilez 80 y o  LJK:[birth year] female MRN: [id number]  DOS:9/7/22   Unit/Bed#: -01 Encounter: [id number]      Requested by (Physician/Service): Theodora Acevedo MD    HPI: Jelena Avilez 80year-old female with a past medical history of Afib, presented to [address] [place] [address] on 8/12/22 with dense left-sided weakness and dysarthria found to be embolic right MCA CVA   This was felt to be a Xarelto failure hence the patient was switched to Pradaxa as anticoagulants Her past medical history is significant for sick sinus syndrome, hypertension, hyperlipidemia, valvular disease, coronary artery disease  She is status post pacemaker placement that is not MRI compatible    She had systolic blood pressure of 200 on arrival   CT head showed focal area of gliosis within the high right post central gyrus and scattered chronic microvascular ischemic changes with more focal lucency along the anterior limb of the right internal capsule   CTA of head and neck showed near occlusive thrombus at the right MCA bifurcation, mild beating of the mid to distal ICA suspicious mild fibromuscular dysplasia and mild atherosclerotic disease at the bilateral distal carotid arteries   She was not a candidate for MRI due to pacemaker   CT cerebral perfusion study showed 8 mL of mismatch   She was not a candidate for tPA due to bleeding risk from being on Xarelto   She was not a thrombectomy candidate for neurointerventional   Patient was recommended to transition to Pradaxa   Patient was evaluated by skilled therapies and was found to have significant decline in ADLs and ambulation and appears appropriate for admission to [address] [place] [address]    Past Medical History:   Diagnosis Date    A-fib [facility])     Anemia     Arthritis     Breast pain, right     Chest pain on breathing     Colon polyp     Cough     Diverticulosis     Encounter for screening colonoscopy     H/O sick sinus syndrome     Heart murmur     High cholesterol     History of atrial fibrillation     Rate ontrolled  On xarelto 15mg (creatinine 50) Followed by Dr Vaishnavi Adams with Cardiology     History of weight loss     Report loose fitting clothes  Weight stable (3lbs difference)  Last colo showed small tubular adenoma x2  Breast biopsy last showed fibrocystic changes  TSH wnl  Will check cbc, bmp, spep          Hx of long term use of blood thinners     Hypertension     Irregular heart beat     Pacemaker     Preop examination     Shortness of breath     Viral bronchitis        Patient Active Problem List    Diagnosis Date Noted   Lorcarrola Craw 09/06/2022    At risk for delirium 09/06/2022    Cough 09/03/2022    Valvular heart disease 08/20/2022    CAD (coronary artery disease) 08/20/2022    Urinary retention 08/20/2022    Neck pain 08/20/2022    Healthcare maintenance 08/20/2022    At risk for coping difficulty 08/20/2022    Anemia 08/19/2022    At risk for venous thromboembolism (VTE) 08/19/2022    DARRIUS (acute kidney injury) ([address] ) 08/19/2022    Spastic hemiparesis of left dominant side as late effect of cerebral infarction ([address] ) 08/19/2022    Arterial ischemic stroke, MCA (middle cerebral artery), right, acute ([address] ) 08/12/2022    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 ([address] ) 08/12/2022    Renal insufficiency     Abnormal nuclear stress test 08/20/2021    Diverticulosis of colon 05/20/2019    Pacemaker 02/26/2018    Tubulovillous adenoma 02/09/2018    Gastroesophageal reflux disease without esophagitis 02/09/2018    Other chest pain 12/08/2017    Essential hypertension 12/08/2017    Hyperlipidemia LDL goal <100 12/08/2017    Stage 3 chronic kidney disease ([address] ) 12/08/2017    Osteoarthritis of both wrists 12/08/2017    Chronic atrial fibrillation ([address] ) 12/08/2017    Cavus deformity of foot 06/27/2017    Hallux abducto valgus, bilateral 06/27/2017    Lipoma of right upper extremity 05/17/2017    Pain due to onychomycosis of toenails of both feet 01/20/2017    Allergic rhinitis due to pollen 10/12/2015    Midline cystocele 12/23/2014    Osteoarthritis of hip 07/17/2014    B12 deficiency 12/06/2013    Osteopenia 11/15/2013    Left renal artery stenosis ([facility] ) 08/27/2013    Left atrial enlargement 08/27/2013       Body mass index is 24 64 kg/m²  Past Medical History:     Past Medical History:   Diagnosis Date    [facility]fib ([facility] )     Anemia     Arthritis     Breast pain, right     Chest pain on breathing     Colon polyp     Cough     Diverticulosis     Encounter for screening colonoscopy     H/O sick sinus syndrome     Heart murmur     High cholesterol     History of atrial fibrillation     Rate ontrolled  On xarelto 15mg (creatinine 50) Followed by Dr Vaishnavi Adams with Cardiology     History of weight loss     Report loose fitting clothes  Weight stable (3lbs difference)  Last colo showed small tubular adenoma x2  Breast biopsy last showed fibrocystic changes  TSH wnl  Will check cbc, bmp, spep   Hx of long term use of blood thinners     Hypertension     Irregular heart beat     Pacemaker     Preop examination     Shortness of breath     Viral bronchitis         Past Surgical History:     Past Surgical History:   Procedure Laterality Date    BREAST BIOPSY Right 08/17/2006    ultrasound guided Percutaneous Needle Core -Benign    CATARACT EXTRACTION      CATARACT EXTRACTION W/ INTRAOCULAR LENS  IMPLANT, BILATERAL      COLONOSCOPY      COLONOSCOPY N/A 5/22/2018    Procedure: COLONOSCOPY;  Surgeon: Temitope Olvera DO;  Location: [facility]  GI [facility];   Service: Gastroenterology    INSERT / Colon [place] [facility] [place] Right     as a child after a fall    MAMMO STEREOTACTIC BREAST BIOPSY RIGHT (ALL INC) Right 10/2014    benign    FL CMBND ANTERPOST COLPORRAPHY W/CYSTO N/A 3/17/2016 Procedure: COLPORRHAPHY ANTERIOR POSTERIOR ;  Surgeon: Mary Jane Garcia MD;  [facility];  Service: Gynecology    [facility] WHEN P[facility] N/A 4/4/2019    Procedure: COLONOSCOPY;  Surgeon: Chencho Anderson DO;  Location: [facility] GI LAB; Service: Gastroenterology    KS CYSTOURETHROSCOPY N/A 3/17/2016    Procedure: CYSTOSCOPY;  Surgeon: Mary Jane Garcia MD;  [facility];  Service: Gynecology    KS ESOPHAGOGASTRODUODENOSCOPY TRANSORAL DIAGNOSTIC N/A 4/16/2018    Procedure: EGD AND COLONOSCOPY;  Surgeon: Chencho Anderson DO;  Location: [facility]; Service: Gastroenterology    KS LAP,SURG,COLECTOMY, PARTIAL, W/ANAST Right 1/13/2022    Procedure: Diagnostic laparoscopy, laparscopic right colectomy;  Surgeon: Daniela Hastings MD;  Location: [facility] MA[facility];  Service: Colorectal    KS REVAGINAL PROLAPSE,SACROSP LIG N/A 3/17/2016    Procedure: COLPOPEXY VAGINAL EXTRAPERITONEAL (VEC) ANTERIOR ;  Surgeon: Mary Jane Garcia MD;  [facility];  Service: Gynecology    KS SLING OPER STRES INCONTINENCE N/A 3/17/2016    Procedure: INSERTION PUBOVAGINAL SLING SINGLE INCISION ;  Surgeon: Mary Jane Garcia MD;  [facility];  Service: Gynecology         Allergies: Allergies   Allergen Reactions    Other Itching     Bandaids    Tape [Medical Tape] Itching         Family and Social Support:   No data recorded    Social History:    Social History     Socioeconomic History    Marital status:       Spouse name: None    Number of children: None    Years of education: None    Highest education level: None   Occupational History    Occupation: retired   Tobacco Use    Smoking status: Never Smoker    Smokeless tobacco: Never Used   Vaping Use    Vaping Use: Never used   Substance and Sexual Activity    Alcohol use: Never    Drug use: No    Sexual activity: Not Currently     Comment:    Other Topics Concern    None   Social History Narrative    Housing, household, and economic circumstances      Social Determinants of Health     Financial Resource Strain: Low Risk     Difficulty of Paying Living Expenses: Not hard at all   Food Insecurity: No Food Insecurity    Worried About Running Out of Food in the Last Year: Never true    Doris of Food in the Last Year: Never true   Transportation Needs: No Transportation Needs    Lack of Transportation (Medical): No    Lack of Transportation (Non-Medical):  No   Physical Activity: Not on file   Stress: Not on file   Social Connections: Not on file   Intimate Partner Violence: Not on file   Housing Stability: Low Risk     Unable to Pay for Housing in the Last Year: No    Number of Places Lived in the Last Year: 1    Unstable Housing in the Last Year: No        Family History:    Family History   Problem Relation Age of Onset    Diabetes Mother     Breast cancer Sister 48    No Known Problems Father     No Known Problems Maternal Grandmother     No Known Problems Maternal Grandfather     No Known Problems Paternal Grandmother     No Known Problems Paternal Grandfather     No Known Problems Daughter     No Known Problems Son     No Known Problems Sister     No Known Problems Sister     No Known Problems Brother     No Known Problems Brother     No Known Problems Sister     No Known Problems Paternal Aunt     No Known Problems Paternal Aunt     No Known Problems Paternal Aunt     No Known Problems Paternal Aunt        Medications:     Current Facility-Administered Medications:     acetaminophen (TYLENOL) tablet 650 mg, 650 mg, Oral, Q6H PRN, Radha Mendoza MD, 650 mg at 09/07/22 0029    baclofen tablet 5 mg, 5 mg, Oral, TID, Genevajosé miguel Weeks DO, 5 mg at 09/07/22 0933    benzonatate (TESSALON PERLES) capsule 200 mg, 200 mg, Oral, TID, TOMY Romero, 200 mg at 09/07/22 0933    bisacodyl (DULCOLAX) rectal suppository 10 mg, 10 mg, Rectal, Daily PRN, Radha Mendoza MD, 10 mg at 09/03/22 1818    cephalexin (KEFLEX) capsule 500 mg, 500 mg, Oral, Q6H [facility], Shama Zavala, DO, 500 mg at 09/07/22 1200    dabigatran etexilate (PRADAXA) capsule 150 mg, 150 mg, Oral, Q12H [facility], Justice Rocha MD, 150 mg at 09/07/22 0933    guaiFENesin (MUCINEX) 12 hr tablet 600 mg, 600 mg, Oral, Q12H [facility], [facility] TOMY Henley, 600 mg at 09/07/22 0933    losartan (COZAAR) tablet 50 mg, 50 mg, Oral, Daily, [facility] TOMY Henley, 50 mg at 09/07/22 0933    magnesium oxide (MAG-OX) tablet 400 mg, 400 mg, Oral, Daily, Justice Rocha MD, 400 mg at 09/07/22 0933    melatonin tablet 6 mg, 6 mg, Oral, QPM, Shama Zavala DO, 6 mg at 09/06/22 2024    menthol-methyl salicylate (BENGAY) 28-14 % cream, , Apply externally, BID, Neville Dunn MD, Given at 09/07/22 1052    nebivolol (BYSTOLIC) tablet 5 mg, 5 mg, Oral, Daily, Justice Rocha MD, 5 mg at 09/07/22 1052    nitroglycerin (NITROSTAT) SL tablet 0 4 mg, 0 4 mg, Sublingual, Q5 Min PRN, [place] TOMY Barroso    nystatin (MYCOSTATIN) oral suspension 500,000 Units, 500,000 Units, Swish & Swallow, 4x Daily, [place] TOMY Barroso, 500,000 Units at 09/07/22 1051    pantoprazole (PROTONIX) EC tablet 40 mg, 40 mg, Oral, Early Morning, Justice Rocha MD, 40 mg at 09/07/22 0525    phenol (CHLORASEPTIC) 1 4 % mucosal liquid 1 spray, 1 spray, Mouth/Throat, Q2H PRN, Neville Dunn MD, 1 spray at 09/03/22 1515    polyethylene glycol (MIRALAX) packet 17 g, 17 g, Oral, Daily PRN, Justice Rocha MD, 17 g at 08/31/22 0705    pravastatin (PRAVACHOL) tablet 40 mg, 40 mg, Oral, Daily With Naty Winn MD, 40 mg at 09/06/22 1726    tamsulosin (FLOMAX) capsule 0 4 mg, 0 4 mg, Oral, Daily With Lala Burger MD, 0 4 mg at 09/06/22 1727    verapamil (CALAN-SR) CR tablet 240 mg, 240 mg, Oral, HS, Justice Rocha MD, 240 mg at 09/06/22 2109        OBSERVATIONS:     Appearance  __x__Neat ____Disheveled ____Overweight ____Underweight ____Frail    Speech  _x___Fluid, ____Spontaneous ____Circumlocutions ____Word Finding difficulties ____Dysarthria ____Circumstantial ____Paucity ____Perseverative ____Pressured ____ Tangential     Thought Process/Content  _x___Coherent  ____Preoccupations____Ruminations____Obsessions____Hallucinations ____Delusions ____Flight of Ideas ____Distortions ____Distracted ____Suicidal Ideation ____Homicidal Ideation ____ Memory Issues ____ Perseveration ____ Tangential    Interaction  __x__Engaged__x_Cooperative____Superficial____Detached____Fearful____Guarded____Suspicious ____Poor Boundaries ____Aggressive    Affect  _x___Neutral____Positive____Anxiety____Worried_x___Irritable____Angry____Depressed/Dysphoric ____Euthymic _____ Tearful ____ Fatigued     Behavior  _x__Spontaneous ____Purposeful ____Slowed Initiation ____Apathetic ____Impulsive ____Dyscontrolled ____Hypoactive ____Hyperactive ____Repetitive/Perseverative      Overall Progress:    ____Very Declined ____Declined __x__Stable ____Improved ____Very Improved    Motivation and Participation:    ____Very Poor ____Poor ____Fair _x___Good ____Very Good                    ASSESSMENT & RECOMMENDATIONS:   Pt reports feeling very uncomfortable and frustrated today  Her neck hurts and she reports she did not sleep well last night  She believes she slept on it wrong - creating the neck pain  She is using a neck pillow to relieve the pain - which helps a little  She feels the  by phone does not accurately represent what she says because she does not speak the same dialect  Consequently, she feels staff may not always comprehend her needs  She feels frustrated because her breakfast and lunch comes too close together  Also some of the foods she does not prefer and she can not eat because they need to be cut and she can not cut her food on her own  She also does not lot a lot of the foods served to her and she feels people are not hearing her    She does not prefer vegetables  She does like bananas and the fruit  She will eat applesauce  It is very hard for her to swallow and digest dry turkey sandwiches  It may be helpful to engage pt's son, who is hospital employee, in discussions regarding meal preferences so that her needs can be presented  It is a positive sign that pt is attempting to advocate for herself and express her wishes  Provided supportive psychotherapy and mindfulness based interventions  Addressed coping, adjustment, and motivation  I will continue to evaluate pt's emotional functioning and status and provide supportive and mindfulness interventions during pt's stay  Effective coping strategies, distress tolerance, breathing exercises will be key interventions  Continued Psychological intervention is medically necessary to:  __x__ Maintain current progress  __X_   Achieve Therapy Goals   __X__Prevent relapse       DIAGNOSIS:  Adjustment Disorder with Mixed Emotional Features      Thank you for the opportunity to participate in Ms Ramirez Medeiros' care  Joseph Cerda, Ph D   Licensed Psychologist

## 2022-09-07 NOTE — PLAN OF CARE
Problem: MOBILITY - ADULT  Goal: Maintain or return to baseline ADL function  Description: INTERVENTIONS:  -  Assess patient's ability to carry out ADLs; assess patient's baseline for ADL function and identify physical deficits which impact ability to perform ADLs (bathing, care of mouth/teeth, toileting, grooming, dressing, etc )  - Assess/evaluate cause of self-care deficits   - Assess range of motion  - Assess patient's mobility; develop plan if impaired  - Assess patient's need for assistive devices and provide as appropriate  - Encourage maximum independence but intervene and supervise when necessary  - Involve family in performance of ADLs  - Assess for home care needs following discharge   - Consider OT consult to assist with ADL evaluation and planning for discharge  - Provide patient education as appropriate  Outcome: Progressing  Goal: Maintains/Returns to pre admission functional level  Description: INTERVENTIONS:  - Set and communicate daily mobility goal to care team and patient/family/caregiver  - Collaborate with rehabilitation services on mobility goals if consulted  - Perform Range of Motion 3 times a day  - Reposition patient every 2 hours    - Dangle patient 3 times a day  - Stand patient 3 times a day  - Ambulate patient 3 times a day  - Out of bed to chair 3 times a day   - Out of bed for meals 3 times a day  - Out of bed for toileting  - Record patient progress and toleration of activity level   Outcome: Progressing     Problem: Prexisting or High Potential for Compromised Skin Integrity  Goal: Skin integrity is maintained or improved  Description: INTERVENTIONS:  - Identify patients at risk for skin breakdown  - Assess and monitor skin integrity  - Assess and monitor nutrition and hydration status  - Monitor labs   - Assess for incontinence   - Turn and reposition patient  - Assist with mobility/ambulation  - Relieve pressure over bony prominences  - Avoid friction and shearing  - Provide appropriate hygiene as needed including keeping skin clean and dry  - Evaluate need for skin moisturizer/barrier cream  - Collaborate with interdisciplinary team   - Patient/family teaching  - Consider wound care consult   Outcome: Progressing     Problem: Potential for Falls  Goal: Patient will remain free of falls  Description: INTERVENTIONS:  - Educate patient/family on patient safety including physical limitations  - Instruct patient to call for assistance with activity   - Consult OT/PT to assist with strengthening/mobility   - Keep Call bell within reach  - Keep bed low and locked with side rails adjusted as appropriate  - Keep care items and personal belongings within reach  - Initiate and maintain comfort rounds  - Make Fall Risk Sign visible to staff  - Offer Toileting every 2-4 Hours, in advance of need  - Initiate/Maintain bed/chair alarm  - Obtain necessary fall risk management equipment: nonskid footwear  - Apply yellow socks and bracelet for high fall risk patients  - Consider moving patient to room near nurses station  Outcome: Progressing     Problem: PAIN - ADULT  Goal: Verbalizes/displays adequate comfort level or baseline comfort level  Description: Interventions:  - Encourage patient to monitor pain and request assistance  - Assess pain using appropriate pain scale  - Administer analgesics based on type and severity of pain and evaluate response  - Implement non-pharmacological measures as appropriate and evaluate response  - Consider cultural and social influences on pain and pain management  - Notify physician/advanced practitioner if interventions unsuccessful or patient reports new pain  Outcome: Progressing     Problem: INFECTION - ADULT  Goal: Absence or prevention of progression during hospitalization  Description: INTERVENTIONS:  - Assess and monitor for signs and symptoms of infection  - Monitor lab/diagnostic results  - Monitor all insertion sites, i e  indwelling lines, tubes, and drains  - Monitor endotracheal if appropriate and nasal secretions for changes in amount and color  - [place] appropriate cooling/warming therapies per order  - Administer medications as ordered  - Instruct and encourage patient and family to use good hand hygiene technique  - Identify and instruct in appropriate isolation precautions for identified infection/condition  Outcome: Progressing     Problem: DISCHARGE PLANNING  Goal: Discharge to home or other facility with appropriate resources  Description: INTERVENTIONS:  - Identify barriers to discharge w/patient and caregiver  - Arrange for needed discharge resources and transportation as appropriate  - Identify discharge learning needs (meds, wound care, etc )  - Arrange for interpretive services to assist at discharge as needed  - Refer to Case Management Department for coordinating discharge planning if the patient needs post-hospital services based on physician/advanced practitioner order or complex needs related to functional status, cognitive ability, or social support system  Outcome: Progressing     Problem: Nutrition/Hydration-ADULT  Goal: Nutrient/Hydration intake appropriate for improving, restoring or maintaining nutritional needs  Description: Monitor and assess patient's nutrition/hydration status for malnutrition  Collaborate with interdisciplinary team and initiate plan and interventions as ordered  Monitor patient's weight and dietary intake as ordered or per policy  Utilize nutrition screening tool and intervene as necessary  Determine patient's food preferences and provide high-protein, high-caloric foods as appropriate       INTERVENTIONS:  - Monitor oral intake, urinary output, labs, and treatment plans  - Assess nutrition and hydration status and recommend course of action  - Evaluate amount of meals eaten  - Assist patient with eating if necessary   - Allow adequate time for meals  - Recommend/ encourage appropriate diets, oral nutritional supplements, and vitamin/mineral supplements  - Order, calculate, and assess calorie counts as needed  - Recommend, monitor, and adjust tube feedings and TPN/PPN based on assessed needs  - Assess need for intravenous fluids  - Provide specific nutrition/hydration education as appropriate  - Include patient/family/caregiver in decisions related to nutrition  Outcome: Progressing

## 2022-09-07 NOTE — PROGRESS NOTES
09/07/22 1000   Pain Assessment   Pain Assessment Tool 0-10   Pain Score No Pain   Restrictions/Precautions   Precautions Aspiration;Bed/chair alarms;Cognitive; Fall Risk;Supervision on toilet/commode   Weight Bearing Restrictions No   ROM Restrictions No   Comprehension   Comprehension (FIM) 4 - Understands basic info/conversation 75-90% of time   Expression   Expression (FIM) 5 - Needs help/cues only RARELY (< 10% of the time)   Social Interaction   Social Interaction (FIM) 5 - Interacts appropriately with others 90% of time   Problem Solving   Problem solving (FIM) 4 - Solves basic problems 75-89% of time   Memory   Memory (FIM) 4 - Recognizes/recalls/performs 75-89%   Speech/Language/Cognition Assessmetn   Treatment Assessment Pt seen for skilled speech therapy session targeting cognitive linguistic communication skills  Pt alert and interactive-, just completing PT session prior to arrival  Pt expressing she had another episode last night with word repetition and head movement and therefore did not sleep well and is tired this session  Pt unable to explain further in terms of duration of episode, will check chart to see if team aware  Initially set up with word deduction cognitive task- items presented in [place] for increased comprehension  Pt required repetition of directions and explanation of task before being able to answer the first question correctly  Then at this time, Dr Vincenza Schlatter present to assess pt  Due to language barrier, pt having difficulty with answering appropriately therefore Dr Vincenza Schlatter wanting to use blue phone to communicate effectively  Session ended early to allow MD to assess, will recheck later as able  Plan to cont to target simple cognitive tasks for increased problem solving, memory, organization and recall  Also plan ST follow up with meal as able to review swallow results and follow up with reinforcement of strategies   Pt overall is improving with skilled SLP services and will cont to benefit to maximize overall cognitive linguistic communication abilities at this time  SLP Therapy Minutes   SLP Time In 1000   SLP Time Out 6554   SLP Total Time (minutes) 15   SLP Mode of treatment - Individual (minutes) 15   SLP Mode of treatment - Concurrent (minutes) 0   SLP Mode of treatment - Group (minutes) 0   SLP Mode of treatment - Co-treat (minutes) 0   SLP Mode of Treatment - Total time(minutes) 15 minutes   SLP Cumulative Minutes 475   Therapy Time missed   Time missed?  No

## 2022-09-07 NOTE — DISCHARGE SUMMARY
Discharge Summary - Nichelle Reid 80 y o  female MRN: [id number]  Unit/Bed#: -01 Encounter: [id number]    Admission Date: 8/19/2022     Discharge Date: 9/13/2022    Rehabilitation/Etiologic Diagnosis:   Stroke:  01 1  Left Body Involvement (Right Brain)  Right MCA distribution ischemic infarction    Discharge Diagnoses:      Patient Active Problem List   Diagnosis    Other chest pain    Essential hypertension    Hyperlipidemia LDL goal <100    Stage 3 chronic kidney disease ([facility] [address] )    [facility]    Gastroesophageal reflux disease without esophagitis    Pacemaker    Arterial ischemic stroke, MCA (middle cerebral artery), right, acute ([facility] )    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 ([facility] )    Anemia    At risk for venous thromboembolism (VTE)    DARRIUS (acute kidney injury) ([facility] )    Spastic hemiparesis of left dominant side as late effect of cerebral infarction ([facility] )    Valvular heart disease    CAD (coronary artery disease)    Urinary retention    Neck pain    Healthcare maintenance    Adjustment disorder with mixed emotional features    Cough    Thrush    At risk for delirium    Transient neurological symptoms       Acute Rehabilitation Center Course:     Patient participated in a comprehensive interdisciplinary inpatient rehabilitation program which included involvment of MD, therapies (PT, OT, and SLP), RN, CM/SW, dietary with slow functional gains in ADLs and mobility as outlined below  She will however continue to require further skilled rehabilitation and recovery time before patient can safely discharge back to community  Patient is considered adequately safe and appropriate for discharged to SNF to continue recovery process  Medical issues with comanagement from our internal medicine team as outlined below        Please see below for patient's hospital course and day to day management of medical needs with significant findings, complications (if applicable), treatment, and services provided in problem list format  Decline in ADLs and mobility: Functional assessment - Improving                                                      FIM  Care Score   Admit Score Recent Score    Total assist  1-100% or 2p    Tot       Max assist 2-51-75%    Sub    LBD   Mod assist 3- 26-50%  Par   To hygiene, Bathing   Min assist 3- 25% or < Par       CG assist 4  TA       Sup/Setup 4-5 Sup   UBD    Mod-I/Indep 6 MI         Transfers     Improving assist      Ambulation      150 ft min asisst     Stairs    Total assist/NT     WC mobility 50 ft significant assist to 50 ft mod assist   Supervision comprehension, expression, Min assist PS, memory  Major barriers:  Hemiparesis, impaired balance, coordination, living situation   Dispo:SNF         * Arterial ischemic stroke, MCA (middle cerebral artery), right, acute [facility])  Assessment & Plan  - 9/13 - Neuro exam/L sided strength improving; function improving  - CT head 9/7 - Mildly improved gray-white matter differentiation right MCA territory compared to August 15  No hemorrhagic conversion, laminar necrosis, or mass lesion   - Will need more extended IP rehab course - patient will need to be higher level prior to returning to community   - R MCA CVA   - CTA head/neck - near occlusive thrombus at the R MCA bifurcation, mild beading of the mid to distal ICAs suspicious for mild fibromuscular dysplasia, and mild atherosclerotic disease at bilateral distal carotid arteries     - no MRI because pacemaker not compatible    - Residual impairments on admission to [facility]: L spastic hemiparesis, bowel/bladder dysfunction, possible cog impairments (assess for other impairments during [facility] course)     Secondary stroke prevention  - Repeat CT angio head and neck in 3 months to re-evaluate thrombus as a outpatient - obtain order/follow-up thru neurology   - Antithrombotic: Pradaxa (switched from Xarelto for possible failure) - Statin  - Optimal management of blood pressure and diabetes  - Follow-up with neurology and PCP after d/c   - Completed acute comprehensive interdisciplinary inpatient rehabilitation include intensive skilled therapies (PT, OT, ST) as previously outlined with oversight and management by rehabilitation physician, inpatient rehab level nursing, case management and weekly interdisciplinary team meetings  - multi Podus boot on left foot patient can breaks to potentially decrease risk of contracture/skin breakdown   - patient has spastic left upper extremity making subluxation less likely although continue subluxation precautions      Transient neurological symptoms  Assessment & Plan  - Improving off baclofen - uncertain if this played a role but may have   - Earlier in course -intermittent transient blinking quickly, head shaking, increased L sided shaking, 1 word nonsensical word substitution, atypical occasional spitting sputum   - Spoke with neuro 9/9 - above felt to be largely psychogenic in etiology   - routine EEG 9/8 - unremarkable   - CT head 9/7 - without acute concerns; Mildly improved gray-white matter differentiation right MCA territory compared to August 15  No hemorrhagic conversion, laminar necrosis, or mass lesion   - Neuro follow-up 9/8   "Neurology did see patient in terms some abnormal behaviors they had a low suspicion for seizure they did order an EEG we will wait the results of this  Head CT was negative for any new findings  Will continue to monitor for abnormal behavior although may be focused on psychogenic Foundation  "   - Neuro exam stable, vitals stable; CBC/CMP/TSH wnl  - IM team does not recommend additional med work-up   - May be behavioral/psychogenic   - Optimal mood mgmt (Adjustment d/o with mixed emotional symptoms), sleep mgmt, activity/skilled PT/OT/ST   - D/C'd baclofen recently   - OP follow-up with PCP/neuro       Healthcare maintenance  Assessment & Plan  Per CM patient requested to have Covid booster prior to SNF  - Discussed with pt/son 9/7, 9/9 - ok with risks and injection  - Covid 19 bivalent booster given 9/9  Discharge on magnesium daily   Continue for now  Monitor level  Spastic hemiparesis of left dominant side as late effect of cerebral infarction [facility])  Assessment & Plan  Stable off baclofen   Left upper extremity significantly (also some L neck tightness/spasms)  Modified Sada scale 3 in shoulder, and 2 in her elbow  - Has flexion synergy    - Has clonus in pronators and wrist flexors  Prior PMR provider was consider IP botox injections but not currently available > will need OP follow-up PMR  Gentle range of motion with therapy  Patient does have movement of the hand and wrist and do want to encourage that - can still consider resting hand splint intermittently  Skilled PT OT  Optimal skin hygiene    At risk for delirium  Assessment & Plan  - Possible mild confusion at times but improving prior to d/c off baclofen  - Hold baclofen   - Vitals, labs stable   - continue to monitor closely  - impaired sleep on melatonin 6mg HS > continue for now   - Recommend sleep log  - Limit sedating meds when possible/appropriate     Adjustment disorder with mixed emotional features  Assessment & Plan  Mood improving   Adjustment Disorder with Mixed Emotional Features per psych   Supportive counseling  If needed consider medication in future   Psychology consult while in [facility]       Neck pain  Assessment & Plan  Improving   With taut muscle bands in left cervical paraspinal and trapezius areas  Gentle stretching  Hold baclofen for now with recent mild confusion    Bengay helpful  Heat not helpful  Manual therapies/modalities helpful     PRN APAP    Urinary retention  Assessment & Plan  Improving without recent SC but still somewhat on higher side bladder volumes   - Completed Keflex for 7 day course thru 9/12 per chart review and discussion with IM (UA+, 60-60K Proteus (not technically UTI but recommended to treat recently by on-call PMR as this is stone forming organism, also in context of ongoing urinary dysfunction, recent AMS, UTI risk) - discussed also with IM   - Continue recently started flomax - continue   - Op urology follow-up if needed  - SNF can intermittently monitor bladder scans and PRN for urge to go and unable to go   - ensure optimal bowel managemen  - recommend toileting program Q2-4 H during day and Q4-6H overnight       DARRIUS (acute kidney injury) [facility])  Assessment & Plan  Remains resolved   Medicine consulted  Hold Lasix and Aldactone - likely decreased intake in setting of CVA     Cough  Assessment & Plan  Improving  PRN tessalon perles at d/c   VBS 9/6   - Per SLP - swallow looked good, no aspiration > continue Regular diet   - Patient stated things come up sometimes (but not seen on VBS) - when they panned down esophagus very small amounts of liquid retropulsed a little but then cleared down stomach   Spits out at times and feels like something in throat; intermittent atypical coughing at times > improving   Lung exam remains clear  Saturating well on RA, patient afebrile, without leukocytosis  - Lung exam fairly clear  - CXR 8/25 negative   - PPI for GERD (patient had been on H2 blocker not PPI and we switched today to see if that could help)   - Comgmt with internal medicine   - Nystatin per IM for possible mild thrush on tongue and risk of esophageal involvement - d/c'd now   - Low threshold to repeat CXR, risk of aspiration but on regular diet per SLP still - IM feels not needed and lung exam unremarkable on my eval  - Did have some consideration for tracheobronchitis earlier and she had 3 days of Azithromycin but IM does not feel this is etiology   - If needed OP GI follow-up         Thrush  Assessment & Plan  Improved - Nystatin per IM for possible mild thrush on tongue and risk of esophageal involvement completed     CAD (coronary artery disease)  Assessment & Plan  IM consulted and with overall management at their discretion during [facility] course  Antithrombotic: Pradaxa  Statin  Optimal blood pressure   Outpatient Cardiology follow-up    Per IM  · "Nonobstructive  · Card cath 8/2021 done for borderline abn stress test and found 50% mid RCA/40% mid LAD = no intervention done  · Continue statin  · Was not on ASA as an OP"       Valvular heart disease  Assessment & Plan  Per IM  · "Current ECHO:  LVEF 65%, mod AR/mild AS, mod TR with severely elevated RV systolic pressure, mild-mod MR, mod LAE/mild NEO  · Euvolemic currently but will watch since CTA on admission showed pulm edema and b/l pleural effusions"  -diuretics at their discretion   -Outpatient cardiology follow-up    At risk for venous thromboembolism (VTE)  Assessment & Plan  SCDs, ambulation, and Pradaxa       Pacemaker  Assessment & Plan  Not MRI compatible    Chronic atrial fibrillation ([address] )  Assessment & Plan  IM consulted and with overall management at their discretion during ARC course  Rate control: Verapamil, Nebivolol   Antithrombotic: Pradaxa       Stage 3 chronic kidney disease [facility])  Assessment & Plan  Lab Results   Component Value Date    EGFR 58 09/12/2022    EGFR 58 09/07/2022    EGFR 45 09/05/2022    CREATININE 0 92 09/12/2022    CREATININE 0 92 09/07/2022    CREATININE 1 12 09/05/2022     Stable   Given IVF earlier in course   Avoid nephrotoxic meds  Monitor voiding status  IM consulted       Hyperlipidemia LDL goal <100  Assessment & Plan  Statin    Essential hypertension  Assessment & Plan  Internal medicine consulted and co-management with their service  Monitor vitals with and without activity; monitor for orthostasis  Monitor hemoglobin, electrolytes, kidney function, hydration status   Current meds:  Losartan, nebivolol, Verapamil 240mg daily  - Lasix and Aldactone on hold      Other chest pain  Assessment & Plan  Denies CP currently - remains resolved    Per prior provider  "8/25 with chest pain, ultimately felt to be 2/2 to spasticity  Improved with baclofen/robaxin  - delta hsTrop at 2 hr was -1    - EKG showed paced rhythm, no evidence of ischemia   - CXR unremarkable   - 30 min later had another episode that felt more like pressure  Rapid called, but not concerning for ACS  Generally reproducible   - Increased Baclofen and frequency of [place]  Has a cough with it, and lungs sound good, and CXR unremarkable  - May also be component of GERD  Started on pepcid  Also on throat spray  Fluticasone was irritating and didn't make a difference    - Had SLP eval swallow - which was fine"      Follow-up providers and other issues to be followed up after discharge through PCP and other specialists  PCP   Neurology   Cardiology  GI  Urology   PMR       - See AVS (After visit summary) with discharge instructions, discharge medications, and other details  Imaging (See above as well):  CT head wo contrast   Final Result by Ben Ford DO (09/07 2016)   Mildly improved gray-white matter differentiation right MCA territory compared to August 15  No hemorrhagic conversion, laminar necrosis, or mass lesion  Workstation performed: [id number]         FL barium swallow video w speech   Final Result by [email or web address]ATED, DOCUMENTATION (09/06 0215)      XR chest portable   Final Result by Adry Rob MD (62/47 [id number])      No acute cardiopulmonary disease                    Workstation performed: [id number]             Pertinent Recent Labs (See Course above, EPIC EMR, and if needed request additional records):   Latest Reference Range & Units 09/12/22 05:26   Sodium 135 - 147 mmol/L 138   Potassium 3 5 - 5 3 mmol/L 3 9   Chloride 96 - 108 mmol/L 109 (H)   CO2 21 - 32 mmol/L 25   Anion Gap 4 - 13 mmol/L 4   BUN 5 - 25 mg/dL 18   Creatinine 0 60 - 1 30 mg/dL 0 92   Glucose, Random 65 - 140 mg/dL 86   GLUCOSE FASTING 65 - 99 mg/dL 86   Calcium 8 3 - 10 1 mg/dL 8 7 eGFR ml/min/1 73sq m 58   WBC 4 31 - 10 16 Thousand/uL 6 57   Red Blood Cell Count 3 81 - 5 12 Million/uL 3 45 (L)   Hemoglobin 11 5 - 15 4 g/dL 10 4 (L)   HCT 34 8 - 46 1 % 32 4 (L)   MCV 82 - 98 fL 94   MCH 26 8 - 34 3 pg 30 1   MCHC 31 4 - 37 4 g/dL 32 1   RDW 11 6 - 15 1 % 14 2   Platelet Count 735 - 390 Thousands/uL 182   (H): Data is abnormally high  (L): Data is abnormally low    Procedures Performed During [facility] Admission:   VBS 9/6  "overall functional oral and pharyngeal swallow skills given pt's current age and across items assessed this study  Despite mildly reduced oral control of liquids when taken by cup sips, resulting in premature spillage to the level of the pyriforms, pt demonstrated greater oral control with use of straw and maintained airway protection when taken via cup and straw  Overall swallows remained timely with adequate airway protection with intake of all items  Occasional high penetration of thin liquids but no further deeper penetration or aspiration events  All boluses passed through pharynx and esophagus during brief esophageal screen  Pt without any coughing, throat clearing or expelling of saliva   Pt also denied globus sensation or reflux during study       Note: Images are available for review in PACS as desired      Recommendations:  Diet: regular diet and thin liquids  Meds: whole with liquid, as desired  Strategies: upright posture, only feed when fully alert, slow rate of feeding, small bites/sips, quiet environment (tv off, limit talking, door closed, etc ), alternating bites and sips and smaller meals, remain upright for 30-45 min after meals      Set up assistance with meals-distant supervision if pt's reported s/s persist   F/u ST tx: Recommend brief follow up across a meal to provide education on reflux precautions as pt described s/s of reflux occurring at times-but none present during VFSS       Consider consult with:  GI team should esophageal s/s persist (increased belching with meals, globus sensation, reports of reflux)  Results reviewed with:  patient, RN, MD and TOMY   Aspiration precautions posted      Pt was viewed in lateral position and was given trials of pureed (applesauce), soft moist (canned peaches) and solid food (Christianne Doone cookie) as well as thin liquid  A 13mm pill with thin liquid was also administered  Pt was also viewed in AP view for assessment with thin liquid "    Discharge Physical Examination:  Temp:  [97 8 °F (36 6 °C)-98 °F (36 7 °C)] 97 8 °F (36 6 °C)  HR:  [59-71] 59  Resp:  [17-18] 18  BP: (102-137)/(41-62) 102/41  SpO2:  [96 %-97 %] 97 %  GEN:  Sitting in NAD   HEENT/NECK: MMM  CARDIAC: Regular rate rhythm, no murmers, no rubs, no gallops  LUNGS:  clear to auscultation, no wheezes, rales, or rhonchi  ABDOMEN: Soft, non-tender, non-distended, normal active bowel sounds  EXTREMITIES/SKIN:  no calf edema, no calf tenderness to palpation  NEURO:   MENTAL STATUS:  Appropriate wakefulness and interaction , MENTAL STATUS: Orientation intact able to follow commands, CN II-XII: Mild L facial weakness and Strength/MMT:  LUE prox 3, distal 4-, LLE prox 3+, LLE distal 1+; R 5/5  PSYCH:  Affect:  Euthymic     HPI:   80-year-old female with a past medical history of AFib recently on Xarelto, sick sinus syndrome status status post pacemaker placement that is not MRI compatible, hypertension, hyperlipidemia, valvular disease, coronary artery disease developed dense left-sided weakness, dysarthria and presented to the hospital on 08/12/2022  She had systolic blood pressure of 200 on arrival   CT head showed focal area of gliosis within the high right post central gyrus and scattered chronic microvascular ischemic changes with more focal lucency along the anterior limb of the right internal capsule    CTA of head and neck showed near occlusive thrombus at the right MCA bifurcation, mild beating of the mid to distal ICA suspicious mild fibromuscular dysplasia and mild atherosclerotic disease at the bilateral distal carotid arteries  She was not a candidate for MRI due to pacemaker  CT cerebral perfusion study showed 8 mL of mismatch  She was not a candidate for tPA due to bleeding risk from being on Xarelto  She was not a thrombectomy candidate for neurointerventional   Patient was recommended to transition to Pradaxa  Patient was evaluated by skilled therapies and was found to have significant decline in ADLs and ambulation and appears appropriate for admission to [place] [id number]       Chief complaint:   left-sided weakness     Subjective:   On eval, patient reports ongoing significant dense left lower extremity weakness and moderately severe left upper extremity weakness worse proximally as well as left arm tightness  Patient notes some neck stiffness and pain  She denies shoulder pain or pain radiating down either arm or leg  She notes sensation is fairly intact  Patient reports some difficulty controlling urine earlier but better now  She feels like she would like to continue the external Holly device until she can move more  She denies constipation or bowel incontinence  Patient denies lightheadedness, visual changes, fever, chills, sweats, shortness of breath, calf pain, or other new complaints      Review of Systems: A 10 point ROS was performed; negative except as noted above  Condition at Discharge: stable     Discharge instructions/Information to patient and family:   See after visit summary for information provided to patient and family  Provisions for Follow-Up Care:  See after visit summary for information related to follow-up care and any pertinent home health orders        Disposition: [facility]     Planned Readmission:  No    Discharge Statement   Total time spent examining patient, counseling patient (or patient/family) on condition, medication, rehabilitation/medical plan, and coordinating care on day of discharge: at least 45 minutes  Greater than 50% of the total time was spent examining patient, answering all questions, directly discussing plan of care and post-discharge instructions with patient (or patient and family) some of which specifically related to recent CVA  Additional time spent on coordinating care and other discharge activities  Discharge Medications:  See after visit summary for reconciled discharge medications provided to patient and family

## 2022-09-07 NOTE — PROGRESS NOTES
OT Daily Treatment Note       09/07/22 0701   Pain Assessment   Pain Assessment Tool 0-10   Pain Score No Pain   Restrictions/Precautions   Precautions Aspiration;Bed/chair alarms;Cognitive; Fall Risk;Supervision on toilet/commode   Lifestyle   Autonomy "I am no good today  Last night I kept blinking and repeating words and my head was shaking"   Eating   Type of Assistance Needed Set-up / clean-up   Physical Assistance Level No physical assistance   Comment seated upright in bed as per pt request  A to cut food  Pt was observed shoveling food in the beginning of self-feeding, attempting to each sausage and Salv[place] toast back to back  With cues, pt able to take smaller bites with breaks in between and did not attempt to shovel again for the rest of self-feeding  Eating CARE Score 5   Oral Hygiene   Type of Assistance Needed Set-up / clean-up   Physical Assistance Level No physical assistance   Comment seated in WC at sink  Initially trialed LUE to brush teeth but due to mild coordination deficits, pt switched to RUE for thoroughness   Oral Hygiene CARE Score 5   Shower/Bathe Self   Type of Assistance Needed Physical assistance   Physical Assistance Level 26%-50%   Comment seated shower completed on tub transfer bench  A to bathe back and buttock in stance with UE support on grab bars   Shower/Bathe Self CARE Score 3   Bathing   Assessed Bath Style Shower   Anticipated D/C Bath Style Shower;Sponge Bath   Able to [place] No   Able to [employer] Temperature No   Able to Wash/Rinse/Dry (body part) Left Arm;Right Arm;L Upper Leg;R Upper Leg;L Lower Leg/Foot;R Lower Leg/Foot;Chest;Abdomen;Perineal Area   Limitations Noted in Balance; Coordination; Safety   Positioning Seated;Standing   Adaptive Equipment Shower Bars;Tub Bench   Tub/Shower Transfer   Findings Mod A stand pivot txfr from [facility] > tub transfer bench with BL UE on grab bars for support   Upper Body Dressing   Type of Assistance Needed Supervision Physical Assistance Level No physical assistance   Comment completed seated in WC   Upper Body Dressing CARE Score 4   Lower Body Dressing   Type of Assistance Needed Physical assistance   Physical Assistance Level 51%-75%   Comment seated in WC - A to thread over LLE and when in stance, assist to don over L hip and providing min A as pt dons R hip   Lower Body Dressing CARE Score 2   Putting On/Taking Off Footwear   Type of Assistance Needed Physical assistance   Physical Assistance Level 51%-75%   Comment able to doff socks, required assistance to don socks due to feet being damp   Putting On/Taking Off Footwear CARE Score 2   Sit to Lying   Type of Assistance Needed Physical assistance   Physical Assistance Level 25% or less   Comment < Min A to elevate L foot about an inch onto bed   Sit to Lying CARE Score 3   Lying to Sitting on Side of Bed   Type of Assistance Needed Physical assistance   Physical Assistance Level 25% or less   Comment Assistance with pushing elbows into bed to elevate trunk   Lying to Sitting on Side of Bed CARE Score 3   Sit to Stand   Type of Assistance Needed Physical assistance   Physical Assistance Level 25% or less   Comment Min A with BL UE on grab bar or bedrail for UE support   Sit to Stand CARE Score 3   Bed-Chair Transfer   Type of Assistance Needed Physical assistance   Physical Assistance Level 51%-75%   Comment Mod A sit pivot from bed <> WC   Chair/Bed-to-Chair Transfer CARE Score 2   Toileting Hygiene   Type of Assistance Needed Physical assistance   Physical Assistance Level 76% or more   Comment pt able to complete anterior ervin care seated with setup, req TA for posterior hygiene while pt stands with BL UE support   Toileting Hygiene CARE Score 2   Toilet Transfer   Type of Assistance Needed Physical assistance   Physical Assistance Level 51%-75%   Comment Mod A sit pivot from WC <> toilet   Toilet Transfer CARE Score 2   Cognition   Overall Cognitive Status Impaired Arousal/Participation Alert; Cooperative   Attention Attends with cues to redirect   Orientation Level Oriented X4   Memory Decreased short term memory   Following Commands Follows one step commands with increased time or repetition   Activity Tolerance   Activity Tolerance Patient tolerated treatment well   Assessment   Treatment Assessment Pt participated in skilled OT session with focus on increasing independence with ADL completion  Pt was found seated upright in bed  VS were taken and were WNL  See flowsheet for details regarding functional status  Pt is making slow and steady progress with ADL completion and is motivated to try and complete tasks without physical assistance from staff  Pt is still frequently coughing and spitting out saliva throughout session but is not c/o any throat discomfort  Pt is limited by LUE tone, coordination and strength and requires skilled OT services to increase independence with ADL completion with goal of being DC to [facility]  Plan to continue with ADL re-training, NMR on LUE and functional transfer training  Prognosis Fair   Problem List Decreased range of motion;Decreased strength;Decreased endurance; Impaired balance;Decreased mobility; Decreased coordination;Decreased cognition; Impaired judgement;Decreased safety awareness; Impaired tone;Pain   Barriers to Discharge Inaccessible home environment;Decreased caregiver support   Plan   Treatment/Interventions ADL retraining;Functional transfer training;Elevations; Therapeutic exercise; Endurance training;Cognitive reorientation;Patient/family training;Equipment eval/education; Compensatory technique education   Progress Progressing toward goals   Recommendation   OT Discharge Recommendation Post acute rehabilitation services   OT Therapy Minutes   OT Time In 0700   OT Time Out 0830   OT Total Time (minutes) 90   OT Mode of treatment - Individual (minutes) 90   OT Mode of treatment - Concurrent (minutes) 0   OT Mode of treatment - Group (minutes) 0   OT Mode of treatment - Co-treat (minutes) 0   OT Mode of Treatment - Total time(minutes) 90 minutes   OT Cumulative Minutes 1450   Therapy Time missed   Time missed?  No

## 2022-09-07 NOTE — DISCHARGE INSTRUCTIONS
DISCHARGE INSTRUCTIONS: Dai Vizcarra [age]    Bring these instructions with you to your [facility] Provider so they can order and follow-up any additional lab work or imaging recommended at time of discharge  It  is you or your caregivers responsibility to obtain follow-up MEDICATION REFILLS  As indicated through your Primary Care Physician (PCP) and other outpatient specialty provider(s) after discharge  Please follow-up with your PCP as soon as possible after discharge to set-up follow-up management and when appropriate refills  You have been determined to have some cognitive impairments  - It is YOUR CAREGIVER'S RESPONSIBILITY to ensure appropriate follow-up which includes:  - APPOINTMENTS are scheduled and safe transportation is arranged  - LABS and IMAGING are completed  - MEDICATION MANAGEMENT at home is carried out appropriately     You remain a fall and injury risk which could be severe  - Your risk of fall has decreased however since admission to acute rehab  Caregiver training has been completed with our staff  - Appropriate supervision +/- assistance as instructed during your rehab course is recommended to decrease risk of fall and injury  - Continue skilled therapy as discussed after discharge to further decrease this risk    If you (or your health care proxy) have any questions or concerns regarding your acute rehabilitation stay including issues with medications, rehabilitation, and follow-up plan, please call:          [address] [place] [address] at [phone] or [phone]  Should you develop fevers, chills, new weakness, changes in sensation, difficulty speaking, facial weakness, confusion, shortness of breath, chest pain, or other concerning symptoms please call 911 and/or obtain transportation to nearest ER immediately      Should you develop feelings of significant hopelessness, severe depression, severe anxiety, or suicide you should call 911 or obtain transportation to nearest ER  24[employer]7 [employer] suicide and crisis lifeline call "65"  [address] [facility] is [phone] and is available 24 hrs/7 days a week  [facility] [phone] is available 24 hrs/7 days for mental health  [facility] ([facility] AT [place] [phone] is available 24 hrs/7 days for mental health   [facility] [phone]    PHYSICIANS to see:  Please see your doctors listed in the follow up providers section of your discharge paperwork, and take the discharge paperwork with you to your appointments  LAB WORK recommended after discharge: Follow-up lab work at discretion of your outpatient physicians to be determined at time of your future appointments  Obtain lab orders and follow-up management through skilled nursing facility provider and later primary care physician and outpatient specialists (as indicated)  CBC and BMP to monitor hemoglobin, white blood cell count, electrolytes and kidney function within 1 week (recent anemia)     IMAGING/INCIDENTAL FINDINGS to follow-up:  Follow-up imaging as discussed with your recent physicians or at discretion of your outpatient physicians to be determined at time of your appointments  Obtain the following imaging order and follow-up management through neurology  Follow-up recent stroke; Obtain image CT angio head and neck 3 months from stroke; obtain order from neurology office to monitor 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)    Excessive delay in imaging and appropriate follow-up could potentially increase your risk of complications which could be severe and even life-threatening  It is you or your caregiver's responsibility to ensure these tests are ordered by your outpatient providers and followed up with accordingly        ADDITIONAL FINDINGS and ISSUES to follow-up:    Check under the "DISCHARGE PROVIDER" section of these DISCHARGE INSTRUCTIONS for provider specific issues as well  Excessive delay or lack of appropriate follow-up could potentially increase your risk of complications which could be severe and even life-threatening  It is you or your caregiver's responsibility to ensure these tests are ordered by your outpatient providers and followed up with accordingly  SKIN CARE INSTRUCTIONS to follow:    Monitor skin for increased redness or breadown and promptly notify your physician should these develop    Can use barrier type cream such as Hydragaurd 2 times per day and as needed  Turn patient (yourself) fully every 2 hours while in bed  WEIGHTBEARING/ACTIVITY PRECAUTIONS to follow:    24 hours/7 days per week supervision by caregiver required    Weightbearing as tolerated  Driving restrictions: You are recommended against driving  Work restrictions: You should NOT return to work (if working) until cleared by an outpatient physician  You should not operate heavy machinery (if applicable) until cleared by an outpatient physician  Alcohol restrictions: You are recommended to not drink alcohol at this time unless cleared by an outpatient physician  Drinking alcohol in your current functional condition can increase your risk of injury which could be severe  Drinking alcohol given your current health problems can lead to increased medical complications which could be severe  Combining alcohol with your current medications can increase your risk of injury which could be severe  Smoking restrictions: You are recommended to not smoke nicotine  Smoking increases your risk of heart attack, stroke, emphysema/COPD, and lung cancer  MEDICATIONS:  Please see a full list of your medications outlined in the After Visit Summary that is attached to these Discharge Instructions    Please note changes may have been made to your medications please refer to your discharge paperwork for your current medications and take this list with you to all your doctors appointments for your doctors to review  Please do not resume a home medication unless the medication reconciliation sheet indicates to do so, please do not assume that a medication that you were given a prescription for is the same as a medication you have at home based on both medications having the same name as dosages and frequency may have changed  Unless specifically noted in your medication list provided to you in your discharge paper work do not resume prior vitamins, minerals, or supplements you may have been taking prior to your hospitalization unless instructed by an outpatient physician in the future  Discuss with your primary care at next visit if applicable  NSAID Warning:  Please avoid NSAID (including but not limited to advil, aleve, motrin, naproxen, ibuprofen, mobic, meloxicam, diclofenac etc) medications as NSAID medications may increase your risk of bleeding (which can be life-threatening)  Blood thinners prescribed to optimize long and short term health and decrease risk of complications but with risks related to bleeding and other complications  Dabigatran (Pradaxa) instructions: You have been prescribed Xarelto (Rivaroxaban)  This medication is used to prevent clots which can cause severe disability and even death  This medication will need to be managed by your outpatient physician(s) after discharge  Follow-up with the appropriate provider as soon as possible to ensure appropriate use  This is a strong anticoagulant (blood thinner)  It is being recommended for use by you (or your family) to decrease the risk of clotting which can be severely disabling and even life-threatening  Even when provided as recommended it can cause severe disabling and even life-threatening bleeding    Too much or too little of this blood thinner further increases your risk of this medication causing serious and even life-threatening complications such as severe bleeding, clots, strokes, and death  With that said, at this time based on best available evidence and consensus agreement, your physicians recommend you take Xarelto (Rivaroxaban) medication based on your overall risks and benefits in your specific medical situation  If you (or your family/caregiver) notice black stools, bloody stools, vomit blood, develop new weakness, slurred speech, confusion or have any other concerning symptoms call 911 or obtain transportation to nearest emergency room immediately  MEDICAL MANAGEMENT specific to you:    Hypertension Management:  Only take the medications prescribed for you at time of discharge - overly high or low blood pressure increases your risk for health complications    Follow-up with PCP/family doctor regularly to ensure blood pressure remains adequately controlled  Please check your blood pressure prior to taking your blood pressure medications and keep a log that you will bring with you to your follow-up doctors' appointments  >Please contact your family doctor or cardiologist immediately for a blood pressure below 100/50 and do not take your blood pressure medications until speaking with them  >Please contact your family doctor or cardiologist as soon as possible for blood pressure greater than 160/100  Urinary retention risk: You may have had or are at risk for urinary retention (bladder filling up with excessive urine and inability to adequately expel it)  Urinary retention can lead to significant kidney injury and infection which can be serious  If you do not urinate for 8 hours or more or you have the urge to urinate and are unable to, please obtain transportation to nearest emergency room (or urologist office), for likely placement of contreras    If you develop fever, chills, sweats, confusion, or other concerning symptoms seek medical management right away  Follow-up with primary care and if needed urology after discharge  **At nursing facility - they should intermittently check your bladder for retention until they are comfortable you are not overly filling or anytime you have urge to void and cannot  Please note a summary of your hospital stay with relevant information for your doctors will try to be sent to them  Please confirm with your doctors at your follow up visits that they have received this summary and have them contact [address] [place] [address] if they have not received them along with any other medical records they may require       Dean Carlin Phone Number:  [phone]

## 2022-09-07 NOTE — CONSULTS
Consultation - Neurology   Glasscock Hare 80 y o  female MRN: [id number]  Unit/Bed#: -01 Encounter: [id number]      Assessment/Plan     * Arterial ischemic stroke, MCA (middle cerebral artery), right, acute [facility])  Assessment & Plan  79 y/o female with Afib on Pradaxa, arthritis, hx SSS, s/p pacemaker placement (not MRI compatible), HTN, HLD, CAD, who initially presented to [facility] on 8/12 for left sided weakness, left facial droop, and dysarthria  Imaging revealed near occlusive thrombus at right MCA bifurcation  Patient was not a TNK candidate due to anticoagulation use and was also deemed not a thrombectomy candidate  Imaging demonstrated embolic right MCA stroke  Etiology suspected to be secondary to Xarelto failure in the setting of Afib  Patient was transitioned to Pradaxa for anticoagulation  She was discharged to [facility] on 8/19  Neurology asked to re-evaluate patient as she has been reporting episodes of: head shaking, intermittent nonsensical word substitution, atypical frequent rapid blinking, increased tremulousness  She was noted to have episodes on 9/2, 9/5, and overnight 9/6-9/7  In discussion with patient, she does report maintaining awareness throughout the episode and is able to recall the episodes  Unclear etiology at this time  Low suspicion for seizure but will check routine EEG for further evaluation  Also will repeat CTH to follow up on recent stroke  Suspect possibly behavioral component  Recent urine culture positive for Proteus mirabilis  On exam, patient has left sided weakness (residual from recent stroke)  Plan:  - Routine EEG  - Repeat CT head  - Continue Pradaxa 150 BID  - Currently Keflex for UTI and Baclofen 5 mg TID  - Neuropsych following  - PT/OT  - Delirium precautions  - Promote appropriate sleep-wake cycle  - Monitor neuro exam; notify with any changes  - Medical management and supportive care per primary team  Correction of any metabolic or infectious disturbances  Godwin Cohn will need follow up in in 6 weeks with neurovascular attending or advance practitioner  She will not require outpatient neurological testing  History of Present Illness     Reason for Consult / Principal Problem: Complaints of nonsensical word-substitution, head shaking, frequent blinking  Hx and PE limited by: Language barrier  HPI: Godwin Cohn is a 80 y o   female with Afib on Pradaxa, arthritis, hx SSS, HLD, HTN, CAD, s/p pacemaker placement (not MRI compatible), who presents with multiple complaints, see below  Per chart review, patient initially presented to [facility] on 8/12/22 for left sided weakness, left facial droop, and dysarthria  Imaging revealed near occlusive thrombus at right MCA bifurcation  Patient was not a TNK candidate due to anticoagulation use and was also deemed not a thrombectomy candidate  She was found to have embolic right MCA stroke  This was suspected to be Xarelto failure, therefore, patient was transitioned to Pradaxa for anticoagulation  She was discharged to [facility] on 8/19  Neurology asked to re-evaluate patient as she has been reporting symptoms of (per chart review): several hours of head shaking overnight last night, intermittent nonsensical word substitution, atypical frequent blinking, increased tremulousness/shakiness  Of note, no reports regarding symptoms noted by staff overnight and patient's son also reports patient seemed normal when he saw her yesterday and spoke with her on the phone overnight, but patient's family is concerned regarding these symptoms reported by patient  It appears patient moraima an episode of nonsensical word substitution during conversation on 9/2 and 9/5  Per PMR documentation, on 9/5, patient also reported excessive blinking and making odd sounds with wiggling her tongue that she was able to voluntarily stop   Case had been discussed with neurology at that time and it was suspected that symptoms were behavioral in nature vs possible medication related (Baclofen)  Her Baclofen frequency was decreased from QID to TID  It has been noted that patient's neuro exam has remained stable and unchanged  Patient seen and evaluated at bedside with attending neurologist  History obtained with assistance from translation line  Patient reports she has been having episodes that start off with rapid blinking and then saying words that she does not know what language they are in or what they mean and repeating them over and over again  She states that she also has been having head shaking that started last night, described as whole head going back and forth  She is able to describe these episodes and maintains awareness during these episodes  She states that the episodes seem to usually occur at night  She also feels like she can't breathe or swallow at times but she is able to breathe  She endorses back pain, neck pain, and left shoulder pain  Inpatient consult to Neurology  Consult performed by: TOMY Hughes  Consult ordered by: Donald Baig MD          Review of Systems   Constitutional: Negative for fever  HENT: Positive for trouble swallowing  Eyes: Negative for photophobia and visual disturbance  Respiratory: Positive for chest tightness  Cardiovascular: Negative for chest pain  Gastrointestinal: Negative for abdominal pain  Musculoskeletal: Positive for arthralgias, back pain and neck pain  Skin: Negative for rash  Neurological: Positive for tremors, speech difficulty and weakness (from recent stroke)  Negative for dizziness, seizures, syncope, facial asymmetry, light-headedness, numbness and headaches  Psychiatric/Behavioral: Negative for confusion  All other systems reviewed and are negative        Historical Information   Past Medical History:   Diagnosis Date    A-fib ([facility] [address] )     Anemia     Arthritis     Breast pain, right     Chest pain on breathing     Colon polyp     Cough     Diverticulosis     Encounter for screening colonoscopy     H/O sick sinus syndrome     Heart murmur     High cholesterol     History of atrial fibrillation     Rate ontrolled  On xarelto 15mg (creatinine 50) Followed by Dr Phineas Kussmaul with Cardiology     History of weight loss     Report loose fitting clothes  Weight stable (3lbs difference)  Last colo showed small tubular adenoma x2  Breast biopsy last showed fibrocystic changes  TSH wnl  Will check cbc, bmp, spep   Hx of long term use of blood thinners     Hypertension     Irregular heart beat     Pacemaker     Preop examination     Shortness of breath     Viral bronchitis      Past Surgical History:   Procedure Laterality Date    BREAST BIOPSY Right 08/17/2006    ultrasound guided Percutaneous Needle Core -Benign    CATARACT EXTRACTION      CATARACT EXTRACTION W/ INTRAOCULAR LENS  IMPLANT, BILATERAL      COLONOSCOPY      COLONOSCOPY N/A 5/22/2018    Procedure: COLONOSCOPY;  Surgeon: Cristhian Reed DO;  [facility]; Service: Gastroenterology    Michell Pereira / Gaby Cook / Pura Love Right     as a child after a fall    MAMMO STEREOTACTIC BREAST BIOPSY RIGHT (ALL INC) Right 10/2014    benign    NV CMBND ANTERPOST COLPORRAPHY W/CYSTO N/A 3/17/2016    Procedure: COLPORRHAPHY ANTERIOR POSTERIOR ;  Surgeon: Bonnie Mejia MD;  [facility];  Service: Gynecology    NV COLONOSCOPY FLX DX W/[facility] WHEN PFRMD N/A 4/4/2019    Procedure: COLONOSCOPY;  Surgeon: Cristhian Reed DO;  [facility]; Service: Gastroenterology    NV CYSTOURETHROSCOPY N/A 3/17/2016    Procedure: CYSTOSCOPY;  Surgeon: Bonnie Mejia MD;  [facility];  Service: Gynecology    NV ESOPHAGOGASTRODUODENOSCOPY TRANSORAL DIAGNOSTIC N/A 4/16/2018    Procedure: EGD AND COLONOSCOPY;  Surgeon: Cristhian Reed DO;  [facility];   Service: Gastroenterology    NV LAP,SURG,COLECTOMY, PARTIAL, W/ANAST Right 1/13/2022 Procedure: Diagnostic laparoscopy, laparscopic right colectomy;  Surgeon: Hue Menon MD;  [facility];  Service: Colorectal    NV REVAGINAL PROLAPSE,SACROSP LIG N/A 3/17/2016    Procedure: COLPOPEXY VAGINAL EXTRAPERITONEAL (VEC) ANTERIOR ;  Surgeon: Edgardo Madrid MD;  [facility];  Service: Gynecology    NV SLING OPER STRES INCONTINENCE N/A 3/17/2016    Procedure: INSERTION PUBOVAGINAL SLING SINGLE INCISION ;  Surgeon: Edgardo Madrid MD;  [facility];  Service: Gynecology     Social History   Social History     Substance and Sexual Activity   Alcohol Use Never     Social History     Substance and Sexual Activity   Drug Use No     E-Cigarette/Vaping    E-Cigarette Use Never User      E-Cigarette/Vaping Substances    Nicotine No     THC No     CBD No     Flavoring No     Other No     Unknown No      Social History     Tobacco Use   Smoking Status Never Smoker   Smokeless Tobacco Never Used     Family History:   Family History   Problem Relation Age of Onset    Diabetes Mother     Breast cancer Sister 48    No Known Problems Father     No Known Problems Maternal Grandmother     No Known Problems Maternal Grandfather     No Known Problems Paternal Grandmother     No Known Problems Paternal Grandfather     No Known Problems Daughter     No Known Problems Son     No Known Problems Sister     No Known Problems Sister     No Known Problems Brother     No Known Problems Brother     No Known Problems Sister     No Known Problems Paternal Aunt     No Known Problems Paternal Aunt     No Known Problems Paternal Aunt     No Known Problems Paternal Aunt        Review of previous medical records was completed      Meds/Allergies   all current active meds have been reviewed, current meds:   Current Facility-Administered Medications   Medication Dose Route Frequency    acetaminophen (TYLENOL) tablet 650 mg  650 mg Oral Q6H PRN    baclofen tablet 5 mg  5 mg Oral TID    benzonatate (TESSALON PERLES) capsule 200 mg  200 mg Oral TID    bisacodyl (DULCOLAX) rectal suppository 10 mg  10 mg Rectal Daily PRN    cephalexin (KEFLEX) capsule 500 mg  500 mg Oral Q6H [address]    dabigatran etexilate (PRADAXA) capsule 150 mg  150 mg Oral Q12H [address]    guaiFENesin (MUCINEX) 12 hr tablet 600 mg  600 mg Oral Q12H ADAN    losartan (COZAAR) tablet 50 mg  50 mg Oral Daily    magnesium oxide (MAG-OX) tablet 400 mg  400 mg Oral Daily    melatonin tablet 6 mg  6 mg Oral QPM    menthol-methyl salicylate (BENGAY) 49-14 % cream   Apply externally BID    nebivolol (BYSTOLIC) tablet 5 mg  5 mg Oral Daily    nitroglycerin (NITROSTAT) SL tablet 0 4 mg  0 4 mg Sublingual Q5 Min PRN    nystatin (MYCOSTATIN) oral suspension 500,000 Units  500,000 Units Swish & Swallow 4x Daily    pantoprazole (PROTONIX) EC tablet 40 mg  40 mg Oral Early Morning    phenol (CHLORASEPTIC) 1 4 % mucosal liquid 1 spray  1 spray Mouth/Throat Q2H PRN    polyethylene glycol (MIRALAX) packet 17 g  17 g Oral Daily PRN    pravastatin (PRAVACHOL) tablet 40 mg  40 mg Oral Daily With Dinner    tamsulosin (FLOMAX) capsule 0 4 mg  0 4 mg Oral Daily With Dinner    verapamil (CALAN-SR) CR tablet 240 mg  240 mg Oral HS    and PTA meds:   Prior to Admission Medications   Prescriptions Last Dose Informant Patient Reported? Taking?    Blood Pressure Monitoring (Blood Pressure Cuff) MISC  Self No No   Sig: Use every other day For HTN   cyanocobalamin 1,000 mcg/mL  Self No No   Sig: INJECT 1 ML (1,000 MCG TOTAL) INTO A MUSCLE EVERY 30 (THIRTY) DAYS   dabigatran etexilate (PRADAXA) 150 mg capsu   No No   Sig: Take 1 capsule (150 mg total) by mouth 2 (two) times a day PRICE CHECK DO NOT FILL   furosemide (LASIX) 20 mg tablet   No No   Sig: Take 1 tablet (20 mg total) by mouth daily   losartan (COZAAR) 100 MG tablet  Self No No   Sig: Take 1 tablet (100 mg total) by mouth daily   magnesium oxide (MAG-OX) 400 mg  Self No No   Sig: TAKE 1 TABLET (400 MG TOTAL) BY MOUTH DAILY   nebivolol (BYSTOLIC) 5 mg tablet  Self No No   Sig: Take 1 tablet (5 mg total) by mouth daily   polyvinyl alcohol (LIQUIFILM TEARS) 1 4 % ophthalmic solution  Self No No   Sig: Administer 1 drop to the right eye as needed for dry eyes   potassium chloride (MICRO-K) 10 MEQ CR capsule   No No   Sig: Take 1 capsule (10 mEq total) by mouth daily   rivaroxaban (Xarelto) 15 mg tablet  Self No No   Sig: Take 1 tablet (15 mg total) by mouth daily at bedtime   rosuvastatin (CRESTOR) 10 MG tablet   No No   Sig: TAKE 1 TABLET (10 MG TOTAL) BY MOUTH DAILY   spironolactone (ALDACTONE) 25 mg tablet  Self Yes No   Sig: Take 25 mg by mouth daily   triamcinolone (KENALOG) 0 1 % cream  Self No No   Sig: Apply topically 2 (two) times a day Apply to left arm topically 2 times daily for 1 week   verapamil (CALAN-SR) 240 mg CR tablet   No No   Sig: TAKE 1 TABLET (240 MG TOTAL) BY MOUTH DAILY AT BEDTIME      Facility-Administered Medications Last Administration Doses Remaining   cyanocobalamin injection 1,000 mcg 12/20/2021 12:38 PM           Allergies   Allergen Reactions    Other Itching     Bandaids    Tape [Medical Tape] Itching       Objective   Vitals:Blood pressure 120/65, pulse 62, temperature 98 1 °F (36 7 °C), temperature source Oral, resp  rate 20, height 5' 3" (1 6 m), weight 63 1 kg (139 lb 1 8 oz), SpO2 96 %  ,Body mass index is 24 64 kg/m²  Intake/Output Summary (Last 24 hours) at 9/7/2022 1552  Last data filed at 9/7/2022 1201  Gross per 24 hour   Intake 238 ml   Output 2050 ml   Net -1812 ml       Invasive Devices: Invasive Devices  Report    None                 Physical exam performed by attending neurologist:  Physical Exam  Vitals and nursing note reviewed  Constitutional:       General: She is not in acute distress  Appearance: Normal appearance  She is not ill-appearing  HENT:      Head: Normocephalic        Mouth/Throat:      Mouth: Mucous membranes are moist  Pharynx: Oropharynx is clear  Eyes:      General: No scleral icterus  Right eye: No discharge  Left eye: No discharge  Extraocular Movements: Extraocular movements intact and EOM normal       Conjunctiva/sclera: Conjunctivae normal    Cardiovascular:      Rate and Rhythm: Normal rate  Pulmonary:      Effort: Pulmonary effort is normal  No respiratory distress  Musculoskeletal:         General: Normal range of motion  Cervical back: Normal range of motion  Skin:     General: Skin is warm and dry  Coloration: Skin is not jaundiced or pale  Neurological:      Mental Status: She is alert and oriented to person, place, and time  Psychiatric:         Mood and Affect: Mood normal          Behavior: Behavior normal        Neurologic Exam     Mental Status   Oriented to person, place, and time  Level of consciousness: alert  Able to follow commands  No dysarthria noted  Seems to stutter slightly intermittently during conversation? Able to repeat phrase correctly  Cranial Nerves     CN II   Right visual field deficit: none  Left visual field deficit: none     CN III, IV, VI   Extraocular motions are normal      CN V   Facial sensation intact  CN VII   Facial expression full, symmetric       CN VIII   Hearing: intact    CN IX, X   Palate: symmetric    CN XI   Right trapezius strength: normal  Left trapezius strength: weak    CN XII   CN XII normal      Motor Exam   Muscle bulk: normal  Increased tone in bilateral UE (L>R)  Left UE mild drift, no drift noted in right UE  Right UE strength 4+/5 triceps, 4/5 biceps  Left UE strength 3/5 biceps, 3/5 triceps  Right LE strength 5-/5 hip flexion, knee flexion, knee extension, dorsiflexion, plantar flexion  Left LE strength 2/5 hip flexion, knee flexion, knee extension, dorsiflexion, plantar flexion     Sensory Exam   Light touch normal    No extinction noted     Gait, Coordination, and Reflexes     Tremor   Resting tremor: absent    Reflexes   Right plantar: normal  Left plantar: upgoing  Left ankle clonus induced with plantar flexion       Lab Results:   I have personally reviewed pertinent reports  , CBC:   Results from last 7 days   Lab Units 09/05/22  0428 09/02/22  1549 09/01/22  0654   WBC Thousand/uL 5 88 8 19 6 92   RBC Million/uL 3 59* 3 73* 3 89   HEMOGLOBIN g/dL 10 7* 11 3* 11 7   HEMATOCRIT % 34 5* 34 6* 36 8   MCV fL 96 93 95   PLATELETS Thousands/uL 227 269 263   , BMP/CMP:   Results from last 7 days   Lab Units 09/05/22  0428 09/02/22  1549 09/01/22  0654   SODIUM mmol/L 137 134* 136   POTASSIUM mmol/L 4 0 4 0 4 3   CHLORIDE mmol/L 107 106 106   CO2 mmol/L 24 22 26   BUN mg/dL 29* 26* 28*   CREATININE mg/dL 1 12 1 14 1 04   CALCIUM mg/dL 9 2 9 2 9 1   EGFR ml/min/1 73sq m 45 44 50   , Vitamin B12:   , HgBA1C:   , TSH:   , Coagulation:   , Lipid Profile:   , Ammonia:   , Urinalysis:   Results from last 7 days   Lab Units 09/02/22  1757   COLOR UA  Light Yellow   CLARITY UA  Turbid   SPEC GRAV UA  1 009   PH UA  7 5   LEUKOCYTES UA  Large*   NITRITE UA  Negative   GLUCOSE UA mg/dl Negative   KETONES UA mg/dl Negative   BILIRUBIN UA  Negative   BLOOD UA  Negative   , Drug Screen:   , Medication Drug Levels:       Invalid input(s): CARBAMAZEPINE,  PHENOBARB, LACOSAMIDE, OXCARBAZEPINE     Imaging Studies: I have personally reviewed pertinent reports  EKG, Pathology, and Other Studies: I have personally reviewed pertinent reports      VTE Prophylaxis: Sequential compression device (Venodyne)  and Pradaxa    Code Status: Level 1 - Full Code  Advance Directive and Living Will:      Power of :    POLST:

## 2022-09-07 NOTE — PROGRESS NOTES
Spiritual Care Progress Note    2022  Patient: Pankaj Vidales : 1940  Admission Date & Time: 2022 1522  MRN: [id number] CSN: [id number]      Fr Fabiana Gu visited pt to provide prayer, Holy Communion, and a blessing  Chaplains remain available                 Chaplaincy Interventions Utilized:   Exploration: Explored spiritual needs & resources    Collaboration: Facilitated respect for spiritual/cultural practice during hospitalization    Relationship Building: Cultivated a relationship of care and support    Ritual: Provided prayer and Provided ritual       22 1051   Clinical Encounter Type   Visited With Patient   Routine Visit Follow-up   Referral From [address] [place] [address] Encounters   Communion Given Indicator Yes   Sacrament Other Other (Comment)  ([place])

## 2022-09-07 NOTE — PROGRESS NOTES
09/07/22 0930   Pain Assessment   Pain Assessment Tool 0-10   Pain Score No Pain   Restrictions/Precautions   Precautions Aspiration;Bed/chair alarms; Fall Risk;Supervision on toilet/commode   Weight Bearing Restrictions No   ROM Restrictions No   Braces or Orthoses AFO   Subjective   Subjective pt reports not sleeping well last night and feeling very tired  She mentioed that she had another episode where she kept repeating a nonsense, not enligh nor Croatian word that she "could not get out of my head, and I don't know why I was saying it"   Therapeutic Interventions   Strengthening heel slide to improve L LE hamstring control and strength x10  PT provided AAROM for final 3 repititions  LAQs x10, verbal cues to encourage full extension of knee  glute sets x10 w/ 3 sec hold  qud sets x10 w/ 3 sec hold  Flexibility L LE hamstring stretch provided in bed w/ HOB flat, increase in tone noted  clonus present  L LE gastroc stretch provided   Neuromuscular Re-Education LE D1 PNF pattern aopplied with AAROM to complete full pattern  Assessment   Treatment Assessment Pt participated in 30 min skilled therapy session w/ focus on strength and flexibility improvements  All activities were completed lying supine with HOB flat  Pt presented with an increase in tone in her L LE, as well as increased spasticity leading to more prominent clonus  MD notified  Pt reported no pain throughout exercises but reported feeling tired after completing the minimal strength activities   Plan to continue more challenging amb, balance, and endurance activities in the afternoon of 9/7/22, as the pt will have more time to rest    PT Therapy Minutes   PT Time In 0930   PT Time Out 1000   PT Total Time (minutes) 30   PT Mode of treatment - Individual (minutes) 30   PT Mode of treatment - Concurrent (minutes) 0   PT Mode of treatment - Group (minutes) 0   PT Mode of treatment - Co-treat (minutes) 0   PT Mode of Treatment - Total time(minutes) 30 minutes   PT Cumulative Minutes 0804   Therapy Time missed   Time missed?  No

## 2022-09-07 NOTE — PROGRESS NOTES
Internal Medicine Progress Note  Patient: Jerry Lockwood  Age/sex: 80 y o  female  Medical Record #: [id number]      ASSESSMENT/PLAN: (Interval History)  Jerry Lockwood is seen and examined and management for following issues:    Acute embolic right MCA CVA  · Had near occlusion of right MCA  · Felt to be a Xarelto failure and was switched to Pradaxa  · ECHO w/o thrombus  · Continue statin  · On Baclofen for tone = much improved   · On 9/2, 9/5 and last night states she is saying a specific word that means nothing  She will repeat a particular word that is not really a word and she knows it  Daylin Salas speaks fluently about it at the same time  Reyna Mathews neuro exam has been unchanged   Dr Anabel Martinez d/w Neuro on 9/5 and they felt sx don't seem to be neurological = med related?     Chronic atrial fibrillation  · Sees Dr Holli Chi as OP  · Rates controlled  · Cont Pradaxa/Bystolic     PPM  · VVI  · Is not MRI conditional  · 100% paced on EKG     Valvular disease  · Current ECHO:  LVEF 65%, mod AR/mild AS, mod TR with severely elevated RV systolic pressure, mild-mod MR, mod LAE/mild NEO  · Euvolemic currently     HTN  · Home:  Verapamil 240mg qhs/Cozaar 295 mg qd/Bystolic 5mg qd/lasix 91GY qd/Aldactone 25mg qd  · Here:  Verapamil 240mg qhs/Cozaar 07PJ qd/Bystolic 5mg qd  · OP note states has left RA stenosis but no testing in OP records or Care Everywhere to verify  · BP was getting a bit soft at times so starting 9/6, reduced Cozaar to 50mg qd and has improved  · No changes today     HLD  · Home:  Crestor 10mg qd = Neuro rec when discharged to reduce to 5mg qd  · Here:  Pravachol 40mg qd     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  · S/p NS 1 liter   · Back to baseline = as OP GFR 50's to 60's    GFR here 40's to 60's  · Continue to hold Lasix and Aldactone for now     CAD  · Nonobstructive  · Card cath 8/2021 done for borderline abn stress test and found 50% mid RCA/40% mid LAD = no intervention done  · Continue statin  · Was not on ASA as an OP     Hypomagnesemia  · Cont 400 mg MagOx  · stable     Left chest pain  · Had left neck pain (not new for her) then pain under left breast 8/31/22 = reproducible with palpation  · Cardiac w/u negative  · Etio was her left arm spasticity   · 2/2 developing odd behaviors over her time here in [facility] so Baclofen reduced 9/5 to 5mg TID      Cough   · CXR negative 8/25  · Has scheduled Tessalon perles 200mg TID/Chlorarseptic spray/pepcid/Mucinex  · Started z-pack 9/2 in case has tracheobronchitis which she had a x3 days and stopped when Keflex started by PMR for asymp bacteruria   · Flonase was not effective  · Cozaar likely not the cause since has been on for a long time  · Says feels like something in throat and constantly spits out her saliva = per ST has no previous issues eating/drinking but since no improvement --> D/W Dr Suzanna Puckett and ST --> s/p VBS 9/6 w/o acute findings  · May have some thrush as below but overall likely behavioral     Possible thrush  · On tongue patel have some mild thrush  · If so, then could have some esophagitis from it as well  · Will empirically tx with Mycostatin      Urine retention  · Getting str cath'd  · Management per PMR  · Flomax added on 8/31/22  · Urine culture is >60,000-69,000 proteus = no sx but PMR tx'ing with Keflex empirically     Vaginal yeast infection  · S/p Monistat pack/Diflucan 150mg x 1         DC planning: Reteam       The above assessment and plan was reviewed and updated as determined by my evaluation of the patient on 9/7/2022      Labs:   Results from last 7 days   Lab Units 09/05/22  0428 09/02/22  1549   WBC Thousand/uL 5 88 8 19   HEMOGLOBIN g/dL 10 7* 11 3*   HEMATOCRIT % 34 5* 34 6*   PLATELETS Thousands/uL 227 269     Results from last 7 days   Lab Units 09/05/22  0428 09/02/22  1549   SODIUM mmol/L 137 134*   POTASSIUM mmol/L 4 0 4 0   CHLORIDE mmol/L 107 106   CO2 mmol/L 24 22   BUN mg/dL 29* 26*   CREATININE mg/dL 1 12 1 14   CALCIUM mg/dL 9 2 9 2 Review of Scheduled Meds:  Current Facility-Administered Medications   Medication Dose Route Frequency Provider Last Rate    acetaminophen  650 mg Oral Q6H PRN Shantell MD Barbara      baclofen  5 mg Oral TID Blanca Bowen, DO      benzonatate  200 mg Oral TID Hernandez Lutz, CRNP      bisacodyl  10 mg Rectal Daily PRN Shantell MD Barbara      cephalexin  500 mg Oral Q6H [address] Blanca Bowen, DO      dabigatran etexilate  150 mg Oral Q12H [address] Shantellrianna Jimenez MD      guaiFENesin  600 mg Oral Q12H [address] Macel Seamtanya [place], CRNP      losartan  50 mg Oral Daily Macel Danialtanya Piper, CRNP      magnesium oxide  400 mg Oral Daily Shantell Jimenez MD      melatonin  6 mg Oral QPM Blanca Bowen,       menthol-methyl salicylate   Apply externally BID Federica Hawkins MD      nebivolol  5 mg Oral Daily Shantell Jimenez MD      nitroglycerin  0 4 mg Sublingual Q5 Min PRN Saunders Monarch, TOMY      nystatin  500,000 Units Swish & Swallow 4x Daily Macel Danialtanya Piper, CRNP      pantoprazole  40 mg Oral Early Morning Shantellrianna Jimenez MD      phenol  1 spray Mouth/Throat Q2H PRN Federica Hawkins MD      polyethylene glycol  17 g Oral Daily PRN Shantell Jimenez MD      pravastatin  40 mg Oral Daily With Mikhail Marti MD      tamsulosin  0 4 mg Oral Daily With Marilee Simons MD      verapamil  240 mg Oral HS Shantell Jimenez MD         Subjective/ HPI: Patient seen and examined  Patients overnight issues or events were reviewed with nursing or staff during rounds or morning huddle session  New or overnight issues include the following:     Says last night she said she kept blinking and repeating words  Still spitting out her saliva that is clear    ROS:   A 10 point ROS was performed; negative except as noted above         Imaging:     FL barium swallow video w speech   Final Result by SYSTEMGENERATED, DOCUMENTATION (09/06 5661)      XR chest portable   Final Result by Jess Morillo MD (08/25 7364)      No acute cardiopulmonary disease  Workstation performed: [id number]             *Labs /Radiology studies reviewed  *Medications reviewed and reconciled as needed  *Please refer to order section for additional ordered labs studies  *Case discussed with primary attending during morning huddle case rounds    Physical Examination:  Vitals:   Vitals:    09/06/22 1418 09/06/22 2036 09/07/22 0523 09/07/22 0900   BP: 124/58 134/66 156/65 135/55   BP Location: Left arm Left arm Right arm Right arm   Pulse: 88 89 82 78   Resp: 18 18 18    Temp: (!) 97 3 °F (36 3 °C) 98 4 °F (36 9 °C) 97 6 °F (36 4 °C)    TempSrc: Oral Oral Oral    SpO2: 98% 96% 96%    Weight:   63 1 kg (139 lb 1 8 oz)    Height:           General Appearance: no distress, conversive  HEENT: PERRLA, conjuctiva normal; oropharynx clear; mucous membranes moist   Neck:  Supple, normal ROM  Lungs: CTA, normal respiratory effort, no retractions, expiratory effort normal  CV: regular rate and rhythm; no rubs/murmurs/gallops, PMI normal   ABD: soft; ND/NT; +BS  EXT: no edema  Skin: normal turgor, normal texture, no rashes  Psych: affect normal, mood normal  Neuro: AA      The above physical exam was reviewed and updated as determined by my evaluation of the patient on 9/7/2022  Invasive Devices  Report    None                    VTE Pharmacologic Prophylaxis: Pradaxa  Code Status: Level 1 - Full Code  Current Length of Stay: 19 day(s)      Total time spent:  30 minutes with more than 50% spent counseling/coordinating care  Counseling includes discussion with patient re: progress  and discussion with patient of his/her current medical state/information  Coordination of patient's care was performed in conjunction with primary service  Time invested included review of patient's labs, vitals, and management of their comorbidities with continued monitoring   In addition, this patient was discussed with medical team including physician and advanced extenders  The care of the patient was extensively discussed and appropriate treatment plan was formulated unique for this patient  ** Please Note:  voice to text software may have been used in the creation of this document   Although proof errors in transcription or interpretation are a potential of such software**

## 2022-09-07 NOTE — ASSESSMENT & PLAN NOTE
- Possible mild confusion at times but improving prior to d/c off baclofen  - Hold baclofen   - Vitals, labs stable   - continue to monitor closely  - impaired sleep on melatonin 6mg HS > continue for now   - Recommend sleep log  - Limit sedating meds when possible/appropriate

## 2022-09-07 NOTE — NURSING NOTE
Pt's bed alarm was going off  Pt was found standing next to his bed stating that he was going to shut his door  Pt was placed back in bed and reminded that he cannot get out of bed on his own and to use his call bell  Pt's alarm was set on the middle setting

## 2022-09-07 NOTE — PROGRESS NOTES
Physical Medicine and Rehabilitation Progress Note  Tita Sabillon 80 y o  female MRN: [id number]  Unit/Bed#: -01 Encounter: [id number]      Assessment & Plan:     Decline in ADLs and mobility: Functional assessment - slowly improving        FIM  Care Score  Admit Score Recent Score    Total assist  1-100% or 2p    Tot     Max assist 2-51-75%    Sub  To hygiene, LBD    Mod assist 3- 26-50%  Par  Bathing   Min assist 3- 25% or < Par  UBD   CG assist 4  TA     Sup/Setup 4-5 Sup     Mod-I/Indep 6 MI      Transfers   Significant assist     Ambulation    Total assist     Stairs   Total assist/NT    WC mobility 50 ft significant assist to 50 ft mod assist   Major barriers:  Hemiparesis, impaired balance, coordination  Dispo & ELOS: SNF in next few days      * Arterial ischemic stroke, MCA (middle cerebral artery), right, acute (HCC)  Assessment & Plan  - Neuro exam stable again 9/6   - Slow functional recovery - likely will need more extended IP rehab course - patient will need to be higher level prior to returning to community   - R MCA CVA   - CTA head/neck - near occlusive thrombus at the R MCA bifurcation, mild beading of the mid to distal ICAs suspicious for mild fibromuscular dysplasia, and mild atherosclerotic disease at bilateral distal carotid arteries     - no MRI because pacemaker not compatible    - Residual impairments on admission to [facility]: L spastic hemiparesis, bowel/bladder dysfunction, possible cog impairments (assess for other impairments during [facility]C course)     Secondary stroke prevention  - repeat CT angio head and neck in 3 months to re-evaluate thrombus is a outpatient  - Antithrombotic: Pradaxa (switched from Xarelto for possible failure)   - Statin  - Optimal management of blood pressure and diabetes  - Patient at increased risk for stroke particularly early in post-stroke period - monitor neuro exam closely with low threshold to repeat imaging  -  patient and if applicable caregiver on optimal stroke management  - Follow-up with neurology and PCP after d/c   - Continue acute comprehensive interdisciplinary inpatient rehabilitation to include intensive skilled therapies (PT, OT, ST) as outlined with oversight and management by rehabilitation physician as well as inpatient rehab level nursing, case management and weekly interdisciplinary team meetings  - patient does have fair left hand and wrist strength; can use resting hand splint for a few hours during the day and overnight but to not overuse and encourage use hand and wrist  - multi Podus boot on left foot patient can breaks to potentially decrease risk of contracture/skin breakdown   - patient has spastic left upper extremity making subluxation less likely although continue subluxation precautions      Spastic hemiparesis of left dominant side as late effect of cerebral infarction [facility])  Assessment & Plan  Left upper extremity significantly (also some L neck tightness/spasms)  Modified Sada scale 3 in shoulder, and 2 in her elbow  - Has flexion synergy    - Has clonus in pronators and wrist flexors      Baclofen 5mg decreased to TID 9/5 with some recent mild AMS > tolerating this dose adequately today > if needed consider decreasing further   Prior PMR provider was consider IP botox injections > will need OP follow-up   Gentle range of motion with therapy  Patient does have movement of the hand and wrist and do want to encourage that - can still consider resting hand splint intermittently  Skilled PT OT  Optimal skin hygiene    At risk for delirium  Assessment & Plan  Staff noting patient slight confusion at times  - Neuro exam stable 9/6, patient orientation intact  - Vitals, labs stable   - continue to monitor closely  - On abx for possible/risk of UTI but only 60-69 K proteus > will follow-up with IM about stopping  - Apparently impaired sleep on melatonin 6mg HS > continue for now   - Recommend sleep log  - Baclofen recently decreased to TID > adjust if needed   - Limit sedating meds when possible/appropriate     Neck pain  Assessment & Plan  Stable   With taut muscle bands in left cervical paraspinal and trapezius areas  Gentle stretching  Baclofen 5 mg TID   Monitor closely given GFR  Bengay helpful  Heat not helpful  Manual therapies/modalities helpful     PRN APAP    Urinary retention  Assessment & Plan  Required SC x1 9/6 with elevated scan but output was only 350; did have SC 9/4 of 550   - Continue Keflex started 9/5 (+UA, 60-69 K Proteus in context of ongoing urinary dysfunction, recent AMS, UTI risk) - typically 5-7 day course but will discuss with IM stopping with only 60-69 CFU  - Continue recently started flomax > may need contreras if does not improve prior to d/c   - Continue bladder scan Q4H, monitor output and PVRs, straight cath >450 or if uncomfortable 250-449  - If high SC's in spite of protocol recommend inserting indwelling contreras for bladder rest and future voiding trial  - monitor for retention, incontinence, signs/symptoms of UTI  - ensure optimal bowel management > p/w significant constipation  - recommend toileting program Q2-4 H during day and Q4-6H overnight       DARRIUS (acute kidney injury) ([facility] [address] )  Assessment & Plan  Remains resolved   Medicine consulted  Hold Lasix and Aldactone - likely decreased intake in setting of CVA   Resume diuretics at discretion of medicine  Monitor labs and output    Cough  Assessment & Plan  VBS 9/6   - Per SLP - swallow looked good, no aspiration  - Patient stated things come up sometimes (but not seen on VBS) - when they panned down esophagus very small amounts of liquid retropulsed a little but then cleared down stomach   - Not significant during my encounter on 9/6  Spits out at times and feels like something in throat; intermittent atypical coughing at times  Lung exam remains clear  Saturating well on RA, patient afebrile, without leukocytosis  - Lung exam fairly clear  - CXR 8/25 negative   - Tessalon Perles TID, PRN Robitussin  - PPI for GERD (patient had been on H2 blocker not PPI and we switched today to see if that could help)   - Comgmt with internal medicine   Nystatin per IM for possible mild thrush on tongue and risk of esophageal involvement  - Low threshold to repeat CXR, risk of aspiration but on regular diet per SLP still - IM feels not needed and lung exam unremarkable on my eval  - Did have some consideration for tracheobronchitis earlier and she had 3 days of Azithromycin but IM does not feel this is etiology   - If needed IP or OP GI follow-up         Thrush  Assessment & Plan  Nystatin per IM for possible mild thrush on tongue and risk of esophageal involvement    At risk for coping difficulty  Assessment & Plan  Supportive counseling  Psychology consult while in [facility]  Discharged on magnesium daily   Continue for now  Monitor level       CAD (coronary artery disease)  Assessment & Plan  IM consulted and with overall management at their discretion during ARC course  Antithrombotic: Pradaxa  Statin  Optimal blood pressure   Outpatient Cardiology follow-up    Per IM  · "Nonobstructive  · Card cath 8/2021 done for borderline abn stress test and found 50% mid RCA/40% mid LAD = no intervention done  · Continue statin  · Was not on ASA as an OP"       Valvular heart disease  Assessment & Plan  Per IM  · "Current ECHO:  LVEF 65%, mod AR/mild AS, mod TR with severely elevated RV systolic pressure, mild-mod MR, mod LAE/mild NEO  · Euvolemic currently but will watch since CTA on admission showed pulm edema and b/l pleural effusions"  -diuretics at their discretion   -Outpatient cardiology follow-up    At risk for venous thromboembolism (VTE)  Assessment & Plan  SCDs, ambulation, and Pradaxa       Pacemaker  Assessment & Plan  Not MRI compatible    Chronic atrial fibrillation [facility])  Assessment & Plan  IM consulted and with overall management at their discretion during ARC course  Rate control: Verapamil, Nebivolol   Antithrombotic: Pradaxa       Stage 3 chronic kidney disease [facility])  Assessment & Plan  Lab Results   Component Value Date    EGFR 45 09/05/2022    EGFR 44 09/02/2022    EGFR 50 09/01/2022    CREATININE 1 12 09/05/2022    CREATININE 1 14 09/02/2022    CREATININE 1 04 09/01/2022     Stable   Given IVF earlier in course   Avoid nephrotoxic meds  Monitor voiding status  IM consulted  Hyperlipidemia LDL goal <100  Assessment & Plan  Statin    Essential hypertension  Assessment & Plan  Internal medicine consulted and co-management with their service  Monitor vitals with and without activity; monitor for orthostasis  Monitor hemoglobin, electrolytes, kidney function, hydration status   Current meds:  Losartan, nebivolol, Verapamil 240mg daily  - Lasix and Aldactone on hold      Other chest pain  Assessment & Plan  Denies CP currently - remains resolved 9/6     Per prior provider  "8/25 with chest pain, ultimately felt to be 2/2 to spasticity  Improved with baclofen/robaxin  - delta hsTrop at 2 hr was -1    - EKG showed paced rhythm, no evidence of ischemia   - CXR unremarkable   - 30 min later had another episode that felt more like pressure  Rapid called, but not concerning for ACS  Generally reproducible   - Increased Baclofen and frequency of Marlon Girt  Has a cough with it, and lungs sound good, and CXR unremarkable  - May also be component of GERD  Started on pepcid  Also on throat spray  Fluticasone was irritating and didn't make a difference    - Had SLP eval swallow - which was fine"      Other Medical Issues:   None    Follow-up providers and other issues to be followed up after discharge  PCP   Neurology   Cardiology    CODE: Level 1: Full Code    Restrictions include: Fall precautions    Objective:     Allergies per EMR  Diagnostic Studies: Reviewed, no new imaging  FL barium swallow video w speech   Final Result by SYSTEMGENERATED, DOCUMENTATION (09/06 1551)      XR chest portable   Final Result by Shane Alvarado MD (21/74 3045)      No acute cardiopulmonary disease                    Workstation performed: [id number]           See above as well    Laboratory: Labs reviewed  Results from last 7 days   Lab Units 09/05/22  0428 09/02/22  1549 09/01/22  0654   HEMOGLOBIN g/dL 10 7* 11 3* 11 7   HEMATOCRIT % 34 5* 34 6* 36 8   WBC Thousand/uL 5 88 8 19 6 92     Results from last 7 days   Lab Units 09/05/22  0428 09/02/22  1549 09/01/22  0654   BUN mg/dL 29* 26* 28*   POTASSIUM mmol/L 4 0 4 0 4 3   CHLORIDE mmol/L 107 106 106   CREATININE mg/dL 1 12 1 14 1 04              Drug regimen reviewed, all potential adverse effects identified and addressed:    Scheduled Meds:  Current Facility-Administered Medications   Medication Dose Route Frequency Provider Last Rate    acetaminophen  650 mg Oral Q6H PRN Donald Baig MD      baclofen  5 mg Oral 4x Daily Maximino Hernandes MD      benzonatate  200 mg Oral TID TOMY Rios      bisacodyl  10 mg Rectal Daily PRN Donald Baig MD      dabigatran etexilate  150 mg Oral Q12H [address] Donald Baig MD      famotidine  20 mg Oral Daily Maximino Hernandes MD      guaiFENesin  600 mg Oral Q12H [address] TOMY Rodriguez      losartan  100 mg Oral Daily Donald Baig MD      magnesium oxide  400 mg Oral Daily Donald Baig MD      melatonin  3 mg Oral QPM Maximino Hernandes MD      menthol-methyl salicylate   Apply externally BID Maximino Hernandes MD      miconazole   Topical BID PRN TOMY Rios      nebivolol  5 mg Oral Daily Donald Baig MD      nitroglycerin  0 4 mg Sublingual Q5 Min PRN TOMY Rodriguez      phenol  1 spray Mouth/Throat Q2H PRN Maximino Hernandes MD      polyethylene glycol  17 g Oral Daily PRN Donald Baig MD      pravastatin  40 mg Oral Daily With Ron Vitale MD      tamsulosin  0 4 mg Oral Daily With Cervilenz Bernardo Gan MD      verapamil  240 mg Oral HS Lee Torres MD         Chief Complaints:  L sided weakness     Subjective:     Patient reports stable L sided strength  She denies increased cough, worsening swallowing, fever, chills, sweats, other pain, nausea, cramping, dysuria, SOB, or other new complaints  ROS: A 10 point ROS was performed; negative except as noted above  Physical Exam:  Vitals:    09/06/22 2036   BP: 134/66   Pulse: 89   Resp: 18   Temp: 98 4 °F (36 9 °C)   SpO2: 96%       GEN:  Lying in bed in NAD   HEENT/NECK: MMM  CARDIAC: Regular rate rhythm, no murmers, no rubs, no gallops  LUNGS:  clear to auscultation, no wheezes, rales, or rhonchi  ABDOMEN: Soft, non-tender, non-distended, normal active bowel sounds  EXTREMITIES/SKIN:  no calf edema, no calf tenderness to palpation  NEURO:   MENTAL STATUS: awake, oriented to person, place, time, and situation, MENTAL STATUS:  Appropriate wakefulness and interaction , CN II-XII: Mild L facial weakness otherwise intact and Strength/MMT:  LUE prox 3-, distal 4, LLE prox 3-, distal trace, R sided 5/5   PSYCH:  Affect:  Euthymic     HPI:  51-year-old female with a past medical history of AFib recently on Xarelto, sick sinus syndrome status status post pacemaker placement that is not MRI compatible, hypertension, hyperlipidemia, valvular disease, coronary artery disease developed dense left-sided weakness, dysarthria and presented to the hospital on 08/12/2022  She had systolic blood pressure of 200 on arrival   CT head showed focal area of gliosis within the high right post central gyrus and scattered chronic microvascular ischemic changes with more focal lucency along the anterior limb of the right internal capsule    CTA of head and neck showed near occlusive thrombus at the right MCA bifurcation, mild beating of the mid to distal ICA suspicious mild fibromuscular dysplasia and mild atherosclerotic disease at the bilateral distal carotid arteries  She was not a candidate for MRI due to pacemaker  CT cerebral perfusion study showed 8 mL of mismatch  She was not a candidate for tPA due to bleeding risk from being on Xarelto  She was not a thrombectomy candidate for neurointerventional   Patient was recommended to transition to Pradaxa  Patient was evaluated by skilled therapies and was found to have significant decline in ADLs and ambulation and appears appropriate for admission to [facility]  ** Please Note: Fluency Direct voice to text software may have been used in the creation of this document  **    Total time spent:  35 minutes, with more than 50% spent counseling/coordinating care  Counseling includes discussion with patient re: progress in therapies, functional issues observed by therapy staff, and discussion with patient his/her current medical state/wellbeing  Coordination of patient's care was performed in conjunction with Internal Medicine service to monitor patient's labs, vitals, and management of their comorbidities  In addition, this patient was discussed by the interdisciplinary team in weekly case conference today  The care of the patient was extensively discussed with all care providers and an appropriate rehabilitation plan was formulated unique for this patient  Barriers were identified preventing progression of therapy and appropriate interventions were discussed with each discipline  Please see the team note for input from all disciplines regarding barriers, intervention, and discharge planning

## 2022-09-08 ENCOUNTER — APPOINTMENT (OUTPATIENT)
Dept: NEUROLOGY | Facility: CLINIC | Age: 82
DRG: 057 | End: 2022-09-08
Payer: MEDICARE

## 2022-09-08 PROBLEM — F43.29 ADJUSTMENT DISORDER WITH MIXED EMOTIONAL FEATURES: Status: ACTIVE | Noted: 2022-08-20

## 2022-09-08 PROBLEM — R29.818 TRANSIENT NEUROLOGICAL SYMPTOMS: Status: ACTIVE | Noted: 2022-09-08

## 2022-09-08 PROCEDURE — 97530 THERAPEUTIC ACTIVITIES: CPT

## 2022-09-08 PROCEDURE — 99233 SBSQ HOSP IP/OBS HIGH 50: CPT

## 2022-09-08 PROCEDURE — 97112 NEUROMUSCULAR REEDUCATION: CPT

## 2022-09-08 PROCEDURE — 99232 SBSQ HOSP IP/OBS MODERATE 35: CPT | Performed by: STUDENT IN AN ORGANIZED HEALTH CARE EDUCATION/TRAINING PROGRAM

## 2022-09-08 PROCEDURE — 99232 SBSQ HOSP IP/OBS MODERATE 35: CPT | Performed by: INTERNAL MEDICINE

## 2022-09-08 PROCEDURE — 95816 EEG AWAKE AND DROWSY: CPT

## 2022-09-08 PROCEDURE — 97535 SELF CARE MNGMENT TRAINING: CPT

## 2022-09-08 PROCEDURE — 95819 EEG AWAKE AND ASLEEP: CPT | Performed by: STUDENT IN AN ORGANIZED HEALTH CARE EDUCATION/TRAINING PROGRAM

## 2022-09-08 PROCEDURE — 92526 ORAL FUNCTION THERAPY: CPT

## 2022-09-08 RX ORDER — CEPHALEXIN 500 MG/1
500 CAPSULE ORAL EVERY 6 HOURS SCHEDULED
Qty: 15 CAPSULE | Refills: 0
Start: 2022-09-09 | End: 2022-09-13

## 2022-09-08 RX ADMIN — MAGNESIUM OXIDE TAB 400 MG (241.3 MG ELEMENTAL MG) 400 MG: 400 (241.3 MG) TAB at 08:29

## 2022-09-08 RX ADMIN — BENZONATATE 200 MG: 100 CAPSULE ORAL at 22:40

## 2022-09-08 RX ADMIN — GUAIFENESIN 600 MG: 600 TABLET, EXTENDED RELEASE ORAL at 22:40

## 2022-09-08 RX ADMIN — PANTOPRAZOLE SODIUM 40 MG: 40 TABLET, DELAYED RELEASE ORAL at 05:44

## 2022-09-08 RX ADMIN — Medication 6 MG: at 22:39

## 2022-09-08 RX ADMIN — MENTHOL, METHYL SALICYLATE: 10; 15 CREAM TOPICAL at 08:29

## 2022-09-08 RX ADMIN — BENZONATATE 200 MG: 100 CAPSULE ORAL at 08:29

## 2022-09-08 RX ADMIN — CEPHALEXIN 500 MG: 500 CAPSULE ORAL at 18:02

## 2022-09-08 RX ADMIN — NEBIVOLOL 5 MG: 5 TABLET ORAL at 08:29

## 2022-09-08 RX ADMIN — NYSTATIN 500000 UNITS: 100000 SUSPENSION ORAL at 18:02

## 2022-09-08 RX ADMIN — MENTHOL, METHYL SALICYLATE: 10; 15 CREAM TOPICAL at 18:02

## 2022-09-08 RX ADMIN — DABIGATRAN ETEXILATE MESYLATE 150 MG: 150 CAPSULE ORAL at 08:29

## 2022-09-08 RX ADMIN — CEPHALEXIN 500 MG: 500 CAPSULE ORAL at 05:44

## 2022-09-08 RX ADMIN — DABIGATRAN ETEXILATE MESYLATE 150 MG: 150 CAPSULE ORAL at 22:40

## 2022-09-08 RX ADMIN — NYSTATIN 500000 UNITS: 100000 SUSPENSION ORAL at 22:40

## 2022-09-08 RX ADMIN — TAMSULOSIN HYDROCHLORIDE 0.4 MG: 0.4 CAPSULE ORAL at 16:52

## 2022-09-08 RX ADMIN — VERAPAMIL HYDROCHLORIDE 240 MG: 240 TABLET ORAL at 22:39

## 2022-09-08 RX ADMIN — CEPHALEXIN 500 MG: 500 CAPSULE ORAL at 12:59

## 2022-09-08 RX ADMIN — PRAVASTATIN SODIUM 40 MG: 40 TABLET ORAL at 16:52

## 2022-09-08 RX ADMIN — NYSTATIN 500000 UNITS: 100000 SUSPENSION ORAL at 12:59

## 2022-09-08 RX ADMIN — LOSARTAN POTASSIUM 50 MG: 50 TABLET, FILM COATED ORAL at 08:29

## 2022-09-08 RX ADMIN — NYSTATIN 500000 UNITS: 100000 SUSPENSION ORAL at 08:29

## 2022-09-08 RX ADMIN — BACLOFEN 5 MG: 10 TABLET ORAL at 08:29

## 2022-09-08 RX ADMIN — BENZONATATE 200 MG: 100 CAPSULE ORAL at 16:52

## 2022-09-08 RX ADMIN — GUAIFENESIN 600 MG: 600 TABLET, EXTENDED RELEASE ORAL at 08:31

## 2022-09-08 NOTE — PROGRESS NOTES
[facility] Occupational Therapy Daily Note  Patient Active Problem List   Diagnosis    Other chest pain    Essential hypertension    Hyperlipidemia LDL goal <100    Stage 3 chronic kidney disease (HCC)    Osteoarthritis of both wrists    Chronic atrial fibrillation (HCC)    Tubulovillous adenoma    Gastroesophageal reflux disease without esophagitis    Pacemaker    B12 deficiency    Allergic rhinitis due to pollen    Cavus deformity of foot    Midline cystocele    Hallux abducto valgus, bilateral    Left renal artery stenosis (HCC)    Osteoarthritis of hip    Osteopenia    Pain due to onychomycosis of toenails of both feet    Left atrial enlargement    Lipoma of right upper extremity    Diverticulosis of colon    Abnormal nuclear stress test    Renal insufficiency    Arterial ischemic stroke, MCA (middle cerebral artery), right, acute ([address] )    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 ([address] )    Anemia    At risk for venous thromboembolism (VTE)    DARRIUS (acute kidney injury) ([address] )    Spastic hemiparesis of left dominant side as late effect of cerebral infarction ([facility] [address] )    Valvular heart disease    CAD (coronary artery disease)    Urinary retention    Neck pain    Healthcare maintenance    At risk for coping difficulty    Cough    Thrush    At risk for delirium       Past Medical History:   Diagnosis Date    A-fib (HCC)     Anemia     Arthritis     Breast pain, right     Chest pain on breathing     Colon polyp     Cough     Diverticulosis     Encounter for screening colonoscopy     H/O sick sinus syndrome     Heart murmur     High cholesterol     History of atrial fibrillation     Rate ontrolled  On xarelto 15mg (creatinine 50) Followed by Dr Bryan German with Cardiology     History of weight loss     Report loose fitting clothes  Weight stable (3lbs difference)  Last colo showed small tubular adenoma x2  Breast biopsy last showed fibrocystic changes  TSH wnl  Will check cbc, bmp, spep          Hx of long term use of blood thinners     Hypertension     Irregular heart beat     Pacemaker     Preop examination     Shortness of breath     Viral bronchitis      Etiologic Diagnosis: right mca ischemic cva  Restrictions/Precautions  Precautions: Aspiration, Bed/chair alarms, Cognitive, Fall Risk, Supervision on toilet/commode  Weight Bearing Restrictions: No  ROM Restrictions: No  Braces or Orthoses: AFO  ADL Team Goal: Patient will be independent with ADLs with least restrictive device upon completion of rehab program  Occupational Therapy LTG's  Eating Oral care Bathing LB dress UB dress   Eating Goal: 06  Independent - Patient completes the activity by him/herself with no assistance from a helper  Oral Hygiene Goal: 06  Independent - Patient completes the activity by him/herself with no assistance from a helper  Shower/bathe self Goal: 06  Independent - Patient completes the activity by him/herself with no assistance from a helper  Lower body dressing Goal: 06  Independent - Patient completes the activity by him/herself with no assistance from a helper  Upper body dressing Goal: 06  Independent - Patient completes the activity by him/herself with no assistance from a helper  Toileting Toilet txf Func txf IADL Med    Toileting hygiene Goal: 06  Independent - Patient completes the activity by him/herself with no assistance from a helper  Toilet transfer Goal: 06  Independent - Patient completes the activity by him/herself with no assistance from a helper           OT interventions: Treatment/Interventions: (P) ADL retraining, Functional transfer training, LE strengthening/ROM, Therapeutic exercise, Endurance training, Cognitive reorientation, Patient/family training, Equipment eval/education, Bed mobility, Compensatory technique education  Discharge Plan:  OT Discharge Recommendation: (P)  (24/7 assist) TBD  DME: [facility]     09/08/22 1045   Pain Assessment   Pain Assessment Tool 0-10   Pain Score No Pain   Lifestyle   Autonomy "still no good "   Lying to Sitting on Side of Bed   Physical Assistance Level 26%-50%   Comment Pt utilizing b/l UE to help advance L LE to L OOB  Pt demo improved L UE pushing to control trunk, and advancement of hips   Lying to Sitting on Side of Bed CARE Score 3   Sit to Stand   Type of Assistance Needed Physical assistance   Physical Assistance Level 26%-50%   Sit to Stand CARE Score 3   Bed-Chair Transfer   Type of Assistance Needed Physical assistance   Physical Assistance Level 26%-50%   Comment modified stand pivot to R  assist for L LE advancement  Chair/Bed-to-Chair Transfer CARE Score 3   Toileting Hygiene   Type of Assistance Needed Physical assistance   Physical Assistance Level 76% or more   Comment Pt req L UE use on grab bar in stance, briefly complete b/l UE relase for clothing management with MOD A for balance  Pt still cont to demo dec L UE reaching for CM over L hips  partial stance with grab bar use for bowel hygiene with MOD A for L LE placement on floor  Toileting Hygiene CARE Score 2   Toilet Transfer   Type of Assistance Needed Physical assistance   Physical Assistance Level 51%-75%   Comment GRab bar use with L UE, lateral side step to standard toilet  inc assist req for standard toilet  Toilet Transfer CARE Score 2   Neuromuscular Education   Functional Movement Patterns repetitive task training completed with leisure activity of coloring  Pt engages in coloring activity with focus on varying grasp, and coordination patterns to isolate wrist movements  inc difficulty with wrist control vs whole arm movement  dec awareness of L UE postioning when not looking at UE  Assessment   Treatment Assessment Pt participated in skilled OT treatment session with treatment focus on ADL re-training, fxnl xfers, R attention and UE NMR  Pt tolerated session well, with improvements in functional use of L UE in task  Pt cont to limited with Spastic hemiparesis impacting safety of LE use during transfers  Still severely limited by spasticity in shoulder unit, kerry pec and scapula region  Pt's left UE function currently Functional assist  Prehension patterns noted (lateral Pinch)  Pt is able to complete 9 hole peg test if given ample time  increased isolation of digits noted  Continue to focus neurological treatment plan:Repetitive Task training, Trunk restrained Reaching, Weight bearing, Mirror Therapy, Body awareness, Gross motor coordination, motor planning, Spatial awareness, Functional Attention , Constraint Induced Movement Therapy (modified) and KinesioTaping   Prognosis Fair   Problem List Decreased strength;Decreased range of motion;Decreased endurance; Impaired balance;Decreased mobility; Decreased coordination;Decreased cognition; Impaired judgement;Decreased safety awareness; Impaired tone   Plan   Treatment/Interventions ADL retraining;Functional transfer training;LE strengthening/ROM; Therapeutic exercise; Endurance training;Cognitive reorientation;Patient/family training;Equipment eval/education; Bed mobility; Compensatory technique education   Progress Slow progress, medical status limitations   Recommendation   OT Discharge Recommendation   (24/7 assist)   OT Therapy Minutes   OT Time In 1045   OT Time Out 1145   OT Total Time (minutes) 60   OT Mode of treatment - Individual (minutes) 60   OT Mode of treatment - Concurrent (minutes) 0   OT Mode of treatment - Group (minutes) 0   OT Mode of treatment - Co-treat (minutes) 0   OT Mode of Treatment - Total time(minutes) 60 minutes   OT Cumulative Minutes 2517

## 2022-09-08 NOTE — PROGRESS NOTES
09/08/22 0750   Pain Assessment   Pain Assessment Tool 0-10   Pain Score No Pain   Restrictions/Precautions   Precautions Aspiration;Bed/chair alarms;Cognitive; Fall Risk;Supervision on toilet/commode   Weight Bearing Restrictions No   ROM Restrictions No   Eating   Type of Assistance Needed Set-up / clean-up;Supervision   Physical Assistance Level No physical assistance   Eating CARE Score 4   Swallow Assessment   Swallow Treatment Assessment Daily Dysphagia Tx Note     Patient Name: Oswaldo Hare    [facility]'T Date: 9/8/2022      Current Risks for Dysphagia & Aspiration: General debilitation;New Neuro event;Brain injury;Cognitive deficit; Positionong Issues;    Current Symptoms/Concerns: Cough;Clear throat; Aftermeals;Decreased oral intake;Chronic cough;     Current diet:regular diet and thin liquids     Premorbid diet::regular diet and thin liquids     Positioning: upright in bed    Items administered:Consistencies Administered: thin liquids, soft solids and hard solids  Materials administered included 1 bite egg, toast, hood, thin liquids by cup and straw    Total amount of meal consumed:   50% and 300cc liquids    Oral stage:WFL and minimal  Lip closure: adequate  Anterior spillage: none   Mastication: functional for items assessed  Bolus formation: mildly delayed  Bolus control: mildly reduced as noted on VFSS  Transfer: functional  Oral residue: none  Pocketing: none      Pharyngeal stage:WFL  Swallow promptness: overall timely  Hyolaryngeal elevation:  Appears adequate  Wet voice: none  Throat clear: none  Cough: none  Secondary swallows: completed as needed  Audible swallows: none       Esophageal stage:WFL  No s/s esophageal deficits present      Summary:      Pt presenting with overall functional oral and pharyngeal swallow skills given pt's current age and across items assessed this session   Overall retrieval and mastication functional, noted mildly reduced bolus formation and suspect control as seen on VFSS  Transfers and swallows appeared timely, no retention or pocketing observed  No s/s aspiration present as well as pt denied any globus sensation this meal  Reviewed swallow strategies and education- appears receptive  Time spent at end of session to review menu and items of preference as pt receive omelet with mushroom and onions which pt stated she did not like or did not order  Therefore wrote down on paper to post on door for the next few meals of what she would like to order  Recommendations:  Diet: regular diet and thin liquids  Meds: whole with liquid, as desired  Strategies: upright posture, only feed when fully alert, slow rate of feeding, small bites/sips, quiet environment (tv off, limit talking, door closed, etc ), alternating bites and sips and smaller meals, remain upright for 30-45 min after meals      Set up assistance with meals-distant supervision if pt's reported s/s persist   F/u ST tx: Recommend brief follow up across a meal to provide education on reflux precautions as pt described s/s of reflux occurring at times-but none present during VFSS  Results reviewed with:  patient, RN   Aspiration precautions posted  F/u ST tx: Pt ate functional baseline for swallow function, tolerating regular diet and thin liquids appropriately  No further dysphagia tx services warranted at this time  Swallow Assessment Prognosis   Prognosis Good   Prognosis Considerations Age; Co-morbidities; Medical diagnosis; Medical prognosis;Previous level of function;Severity of impairments;New learning ability;Ability to carry over; Cooperation   SLP Therapy Minutes   SLP Time In [id number]   SLP Time Out 0830   SLP Total Time (minutes) 40   SLP Mode of treatment - Individual (minutes) 40   SLP Mode of treatment - Concurrent (minutes) 0   SLP Mode of treatment - Group (minutes) 0   SLP Mode of treatment - Co-treat (minutes) 0   SLP Mode of Treatment - Total time(minutes) 40 minutes   SLP Cumulative Minutes 515   Therapy Time missed   Time missed?  No

## 2022-09-08 NOTE — PROGRESS NOTES
Progress Note - Neurology   Baldemar Bird 80 y o  female [id number]  Unit/Bed#: /-01    Assessment:    * Arterial ischemic stroke, MCA (middle cerebral artery), right, acute [facility])  Assessment & Plan  81 y/o female with Afib on Pradaxa, arthritis, hx SSS, s/p pacemaker placement (not MRI compatible), HTN, HLD, CAD, who initially presented to [facility] on 8/12 for left sided weakness, left facial droop, and dysarthria  Imaging revealed near occlusive thrombus at right MCA bifurcation  Patient was not a TNK candidate due to anticoagulation use and was also deemed not a thrombectomy candidate  Imaging demonstrated embolic right MCA stroke  Etiology suspected to be secondary to Xarelto failure in the setting of Afib  Patient was transitioned to Pradaxa for anticoagulation  She was discharged to [facility] on 8/19  Neurology asked to re-evaluate patient as she has been reporting episodes of: head shaking, intermittent nonsensical word substitution, atypical frequent rapid blinking, increased tremulousness  She was noted to have episodes on 9/2, 9/5, and overnight 9/6-9/7  In discussion with patient, she does report maintaining awareness throughout the episode and is able to recall the episodes  Unclear etiology at this time  Suspect possible behavioral component  Low suspicion for seizure at this time  Routine EEG completed and without epileptiform discharges or electrographic seizures noted  Repeat CTH demonstrated "mildly improved gray-white matter differentiation right MCA territory compared to August 15"  Recent urine culture positive for Proteus mirabilis  On exam, patient has left sided weakness (residual from recent stroke)      Plan:  - Continue Pradaxa 150 BID  - Currently Keflex for UTI and Baclofen 5 mg TID  - Neuropsych following  - PT/OT  - Delirium precautions  - Promote appropriate sleep-wake cycle  - Monitor neuro exam; notify with any changes  - Medical management and supportive care per primary team  Correction of any metabolic or infectious disturbances    - No further inpatient neurology recommendations at this time  Please call with any questions  aSpphire Bautista will need follow up in in 6 weeks with neurovascular attending/AP  She will not require outpatient neurological testing  Case and treatment plan reviewed with attending neurologist, Dr Kimberlyn Parker  Subjective:   Patient resting in bed  She states she had an episode of rapid blinking and head shaking last night that her son was present for  She states she was awake and aware throughout the whole episode and it lasted for approximately 1 minute before self resolving  She notes she felt fine after the episode and denies feeling anxious during or after the episode  She states at times she feels like something is stuck in her throat and she can't stop coughing, which can precede the episode of word substitution  Past Medical History:   Diagnosis Date    A-fib ([facility] [address] )     Anemia     Arthritis     Breast pain, right     Chest pain on breathing     Colon polyp     Cough     Diverticulosis     Encounter for screening colonoscopy     H/O sick sinus syndrome     Heart murmur     High cholesterol     History of atrial fibrillation     Rate ontrolled  On xarelto 15mg (creatinine 50) Followed by Dr Jono Chi with Cardiology     History of weight loss     Report loose fitting clothes  Weight stable (3lbs difference)  Last colo showed small tubular adenoma x2  Breast biopsy last showed fibrocystic changes  TSH wnl  Will check cbc, bmp, spep          Hx of long term use of blood thinners     Hypertension     Irregular heart beat     Pacemaker     Preop examination     Shortness of breath     Viral bronchitis      Past Surgical History:   Procedure Laterality Date    BREAST BIOPSY Right 08/17/2006    ultrasound guided Percutaneous Needle Core -Benign    CATARACT EXTRACTION      CATARACT EXTRACTION W/ INTRAOCULAR LENS  IMPLANT, BILATERAL      COLONOSCOPY      COLONOSCOPY N/A 5/22/2018    Procedure: COLONOSCOPY;  Surgeon: Cesar Worthington DO;  [facility]; Service: Gastroenterology    [facility] Luigi Mccormack Right     as a child after a fall    MAMMO STEREOTACTIC BREAST BIOPSY RIGHT (ALL INC) Right 10/2014    benign    OK CMBND ANTERPOST COLPORRAPHY W/CYSTO N/A 3/17/2016    Procedure: COLPORRHAPHY ANTERIOR POSTERIOR ;  Surgeon: Mickie Vera MD;  [facility];  Service: Gynecology    OK COLONOSCOPY FLX DX W/COLLJ [facility] WHEN PFRMD N/A 4/4/2019    Procedure: COLONOSCOPY;  Surgeon: Cesar Worthington DO;  [facility]; Service: Gastroenterology    OK CYSTOURETHROSCOPY N/A 3/17/2016    Procedure: CYSTOSCOPY;  Surgeon: Mickie Vera MD;  [facility];  Service: Gynecology    OK ESOPHAGOGASTRODUODENOSCOPY TRANSORAL DIAGNOSTIC N/A 4/16/2018    Procedure: EGD AND COLONOSCOPY;  Surgeon: Cesar Worthington DO;  [facility];   Service: Gastroenterology    OK LAP,SURG,COLECTOMY, PARTIAL, W/ANAST Right 1/13/2022    Procedure: Diagnostic laparoscopy, laparscopic right colectomy;  Surgeon: Colton Burt MD;  [facility];  Service: Colorectal    OK REVAGINAL PROLAPSE,SACROSP LIG N/A 3/17/2016    Procedure: COLPOPEXY VAGINAL EXTRAPERITONEAL (VEC) ANTERIOR ;  Surgeon: Mickie Vera MD;  [facility];  Service: Gynecology    OK SLING OPER STRES INCONTINENCE N/A 3/17/2016    Procedure: INSERTION PUBOVAGINAL SLING SINGLE INCISION ;  Surgeon: Mickie Vera MD;  [facility];  Service: Gynecology     Family History   Problem Relation Age of Onset    Diabetes Mother     Breast cancer Sister 48    No Known Problems Father     No Known Problems Maternal Grandmother     No Known Problems Maternal Grandfather     No Known Problems Paternal Grandmother     No Known Problems Paternal Grandfather     No Known Problems Daughter     No Known Problems Son     No Known Problems Sister     No Known Problems Sister     No Known Problems Brother     No Known Problems Brother     No Known Problems Sister     No Known Problems Paternal Aunt     No Known Problems Paternal Aunt     No Known Problems Paternal Aunt     No Known Problems Paternal Aunt      Social History     Socioeconomic History    Marital status:      Spouse name: None    Number of children: None    Years of education: None    Highest education level: None   Occupational History    Occupation: retired   Tobacco Use    Smoking status: Never Smoker    Smokeless tobacco: Never Used   Vaping Use    Vaping Use: Never used   Substance and Sexual Activity    Alcohol use: Never    Drug use: No    Sexual activity: Not Currently     Comment:    Other Topics Concern    None   Social History Narrative    Housing, household, and economic circumstances      Social Determinants of Health     Financial Resource Strain: Low Risk     Difficulty of Paying Living Expenses: Not hard at all   Food Insecurity: No Food Insecurity    Worried About Running Out of Food in the Last Year: Never true    [address] in the Last Year: Never true   Transportation Needs: No Transportation Needs    Lack of Transportation (Medical): No    Lack of Transportation (Non-Medical): No   Physical Activity: Not on file   Stress: Not on file   Social Connections: Not on file   Intimate Partner Violence: Not on file   Housing Stability: Low Risk     Unable to Pay for Housing in the Last Year: No    Number of Places Lived in the Last Year: 1    Unstable Housing in the Last Year: No         Medications:   All current active meds have been reviewed and current meds:  Scheduled Meds:  Current Facility-Administered Medications   Medication Dose Route Frequency Provider Last Rate    acetaminophen  650 mg Oral Q6H PRN Jai Gilmore MD      baclofen  5 mg Oral TID Tomeka Roe DO      benzonatate  200 mg Oral TID TOMY Day      bisacodyl  10 mg Rectal Daily PRN Tania Okeefe MD      cephalexin  500 mg Oral Q6H [facility] Tania Okeefe MD      dabigatran etexilate  150 mg Oral Q12H [facility] Tania Okeefe MD      guaiFENesin  600 mg Oral Q12H [facility] [place] T[facility]      losartan  50 mg Oral Daily [facility] [place], TOMY      magnesium oxide  400 mg Oral Daily Tania Okeefe MD      melatonin  6 mg Oral QPM Kalina Waddell,       menthol-methyl salicylate   Apply externally BID Dylon Choi MD      nebivolol  5 mg Oral Daily Tania Okeefe MD      nitroglycerin  0 4 mg Sublingual Q5 Min PRN TOMY Day      nystatin  500,000 Units Swish & Swallow 4x Daily [facility] Fel[facility], TOMY      pantoprazole  40 mg Oral Early Morning Tania Okeefe MD      phenol  1 spray Mouth/Throat Q2H PRN Dylon Choi MD      polyethylene glycol  17 g Oral Daily PRN Tania Okeefe MD      pravastatin  40 mg Oral Daily With Yoana Hoskins MD      tamsulosin  0 4 mg Oral Daily With Da Khan MD      verapamil  240 mg Oral HS Tania Okeefe MD       Continuous Infusions:   PRN Meds:   acetaminophen    bisacodyl    nitroglycerin    phenol    polyethylene glycol       ROS:   Review of Systems   Constitutional: Negative for fever  HENT: Positive for trouble swallowing  Eyes: Negative for photophobia and visual disturbance  Respiratory: Positive for cough  Negative for shortness of breath  Cardiovascular: Negative for chest pain  Gastrointestinal: Negative for abdominal pain  Musculoskeletal: Positive for neck pain  Skin: Negative for rash  Neurological: Positive for tremors, speech difficulty and weakness  Negative for dizziness, seizures, syncope, facial asymmetry, light-headedness, numbness and headaches  Psychiatric/Behavioral: Negative for confusion     All other systems reviewed and are negative  Vitals:   /52 (BP Location: Right arm)   Pulse 60   Temp 97 8 °F (36 6 °C) (Oral)   Resp 18   Ht 5' 3" (1 6 m)   Wt 63 1 kg (139 lb 1 8 oz)   SpO2 95%   BMI 24 64 kg/m²       Physical Exam:   Physical Exam  Vitals and nursing note reviewed  Constitutional:       General: She is not in acute distress  Appearance: Normal appearance  She is not ill-appearing  HENT:      Head: Normocephalic  Mouth/Throat:      Mouth: Mucous membranes are moist       Pharynx: Oropharynx is clear  Eyes:      General: No scleral icterus  Right eye: No discharge  Left eye: No discharge  Extraocular Movements: Extraocular movements intact and EOM normal       Conjunctiva/sclera: Conjunctivae normal    Cardiovascular:      Rate and Rhythm: Normal rate  Pulmonary:      Effort: Pulmonary effort is normal  No respiratory distress  Musculoskeletal:         General: Normal range of motion  Cervical back: Normal range of motion  Skin:     General: Skin is warm and dry  Coloration: Skin is not jaundiced or pale  Neurological:      Mental Status: She is alert and oriented to person, place, and time  Psychiatric:         Mood and Affect: Mood normal          Behavior: Behavior normal        Neurologic Exam     Mental Status   Oriented to person, place, and time  Level of consciousness: alert  Able to follow commands  No dysarthria or aphasia noted  Cranial Nerves     CN II   Right visual field deficit: none  Left visual field deficit: none     CN III, IV, VI   Extraocular motions are normal      CN V   Facial sensation intact  CN VII   Facial expression full, symmetric       CN VIII   Hearing: intact    CN IX, X   Palate: symmetric    CN XI   CN XI normal      CN XII   CN XII normal      Motor Exam   Muscle bulk: normal  Increased tone in bilateral UE (L>R)  Right UE strength 5/5 deltoid, biceps, triceps, hand   Left UE strength 5-/5 deltoid, 4+/5 biceps, 4/5 triceps, 5-/5 hand   Right LE strength 5-/5 hip flexion, dorsiflexion, plantar flexion  Left LE strength 2/5 hip flexion, dorsiflexion, plantar flexion     Sensory Exam   Light touch normal      Gait, Coordination, and Reflexes     Tremor   Resting tremor: absent    Reflexes   Right plantar: normal  Left plantar: upgoing  Left ankle clonus induced with plantar flexion  No abnormal movements or seizure-like activity noted         Labs: I have personally reviewed pertinent reports     Recent Results (from the past 24 hour(s))   CBC and differential    Collection Time: 09/07/22  3:07 PM   Result Value Ref Range    WBC 7 16 4 31 - 10 16 Thousand/uL    RBC 3 78 (L) 3 81 - 5 12 Million/uL    Hemoglobin 11 5 11 5 - 15 4 g/dL    Hematocrit 35 0 34 8 - 46 1 %    MCV 93 82 - 98 fL    MCH 30 4 26 8 - 34 3 pg    MCHC 32 9 31 4 - 37 4 g/dL    RDW 13 9 11 6 - 15 1 %    MPV 10 5 8 9 - 12 7 fL    Platelets 436 902 - 592 Thousands/uL    nRBC 0 /100 WBCs    Neutrophils Relative 71 43 - 75 %    Immat GRANS % 1 0 - 2 %    Lymphocytes Relative 17 14 - 44 %    Monocytes Relative 10 4 - 12 %    Eosinophils Relative 1 0 - 6 %    Basophils Relative 0 0 - 1 %    Neutrophils Absolute 5 10 1 85 - 7 62 Thousands/µL    Immature Grans Absolute 0 04 0 00 - 0 20 Thousand/uL    Lymphocytes Absolute 1 19 0 60 - 4 47 Thousands/µL    Monocytes Absolute 0 73 0 17 - 1 22 Thousand/µL    Eosinophils Absolute 0 08 0 00 - 0 61 Thousand/µL    Basophils Absolute 0 02 0 00 - 0 10 Thousands/µL   Comprehensive metabolic panel    Collection Time: 09/07/22  3:07 PM   Result Value Ref Range    Sodium 136 135 - 147 mmol/L    Potassium 4 1 3 5 - 5 3 mmol/L    Chloride 106 96 - 108 mmol/L    CO2 23 21 - 32 mmol/L    ANION GAP 7 4 - 13 mmol/L    BUN 17 5 - 25 mg/dL    Creatinine 0 92 0 60 - 1 30 mg/dL    Glucose 85 65 - 140 mg/dL    Calcium 9 4 8 3 - 10 1 mg/dL    Corrected Calcium 10 0 8 3 - 10 1 mg/dL    AST 20 5 - 45 U/L    ALT 21 12 - 78 U/L    Alkaline Phosphatase 73 46 - 116 U/L    Total Protein 7 7 6 4 - 8 4 g/dL    Albumin 3 3 (L) 3 5 - 5 0 g/dL    Total Bilirubin 0 48 0 20 - 1 00 mg/dL    eGFR 58 ml/min/1 73sq m   TSH, 3rd generation with Free T4 reflex    Collection Time: 09/07/22  3:07 PM   Result Value Ref Range    TSH 3RD GENERATON 3 320 0 450 - 4 500 uIU/mL       Imaging: I have personally reviewed pertinent imaging in PACS, and I have personally reviewed PACS reports  EKG, Pathology, and Other Studies: I have personally reviewed pertinent reports  VTE Prophylaxis: Sequential compression device (Venodyne) and Pradaxa        Total time spent today 24 minutes  Greater than 50% of total time was spent with the patient and / or family counseling and / or coordination of care  A description of the counseling / coordination of care: Patient seen and evaluated  Case reviewed with attending  Chart thoroughly reviewed including imaging and labs  Coordination of care with RN  Discussion of imaging and EEG with patient

## 2022-09-08 NOTE — PROGRESS NOTES
Pastoral Care Progress Note    2022  Patient: Enrike Alvarado : 1940  Admission Date & Time: 2022 1522  MRN: [id number] [facility]: [id number]      Fr Bashir visited     22 1500   Clinical Encounter Type   Visited With Patient   Routine Visit Follow-up   [facility] Encounters   [facility] Needs Prayer   Sacramental Encounters   Communion Given Indicator Yes

## 2022-09-08 NOTE — PROGRESS NOTES
Physical Medicine and Rehabilitation Progress Note  Pankaj Vidales 80 y o  female MRN: [id number]  Unit/Bed#: -01 Encounter: [id number]      Assessment & Plan:        Transient neurological symptoms  Assessment & Plan  Intermittent transient blinking quickly, head shaking, increased L sided shaking, 1 word nonsensical word substitution, atypical occasional spitting sputum   - routine EEG 9/8 - unremarkable   - CT head 9/7 - without acute concerns; Mildly improved gray-white matter differentiation right MCA territory compared to August 15  No hemorrhagic conversion, laminar necrosis, or mass lesion   - patient still having occasional random episodes of slight head shaking and blinking quickly  - Neuro follow-up 9/8   "Neurology did see patient in terms some abnormal behaviors they had a low suspicion for seizure they did order an EEG we will wait the results of this  Head CT was negative for any new findings  Will continue to monitor for abnormal behavior although may be focused on psychogenic Foundation  "   - Neuro exam stable, vitals stable; CBC/CMP/TSH wnl  - IM team does not recommend additional med work-up   - May be behavioral/psychogenic   - Optimal mood mgmt (Adjustment d/o with mixed emotional symptoms), sleep mgmt, activity/skilled PT/OT/ST   - D/C baclofen   - OP follow-up with PCP/neuro       * Arterial ischemic stroke, MCA (middle cerebral artery), right, acute [facility])  Assessment & Plan  - 9/8 - Neuro exam stable  - CT head 9/7 - Mildly improved gray-white matter differentiation right MCA territory compared to August 15    No hemorrhagic conversion, laminar necrosis, or mass lesion   - Slow functional recovery - will need more extended IP rehab course - patient will need to be higher level prior to returning to community   - R MCA CVA   - CTA head/neck - near occlusive thrombus at the R MCA bifurcation, mild beading of the mid to distal ICAs suspicious for mild fibromuscular dysplasia, and mild atherosclerotic disease at bilateral distal carotid arteries  - no MRI because pacemaker not compatible    - Residual impairments on admission to [facility]: L spastic hemiparesis, bowel/bladder dysfunction, possible cog impairments (assess for other impairments during [facility] course)     Secondary stroke prevention  - repeat CT angio head and neck in 3 months to re-evaluate thrombus is a outpatient  - Antithrombotic: Pradaxa (switched from Xarelto for possible failure)   - Statin  - Optimal management of blood pressure and diabetes  - Patient at increased risk for stroke particularly early in post-stroke period - monitor neuro exam closely with low threshold to repeat imaging  -  patient and if applicable caregiver on optimal stroke management  - Follow-up with neurology and PCP after d/c   - Continue acute comprehensive interdisciplinary inpatient rehabilitation to include intensive skilled therapies (PT, OT, ST) as outlined with oversight and management by rehabilitation physician as well as inpatient rehab level nursing, case management and weekly interdisciplinary team meetings  - patient does have fair left hand and wrist strength; can use resting hand splint for a few hours during the day and overnight but to not overuse and encourage use hand and wrist  - multi Podus boot on left foot patient can breaks to potentially decrease risk of contracture/skin breakdown   - patient has spastic left upper extremity making subluxation less likely although continue subluxation precautions      Healthcare maintenance  Assessment & Plan  Per CM patient requested to have Covid booster prior to SNF  - Discussed with pt/son 9/7 - ok with risks and injection   - Will likely order after neuro eval   Discharged on magnesium daily   Continue for now  Monitor level       Spastic hemiparesis of left dominant side as late effect of cerebral infarction [facility])  Assessment & Plan  Fairly significant but given some mild disorientation/confusion at times will hold baclofen at this time and monitor closely   Left upper extremity significantly (also some L neck tightness/spasms)  Modified Sada scale 3 in shoulder, and 2 in her elbow  - Has flexion synergy    - Has clonus in pronators and wrist flexors  Prior PMR provider was consider IP botox injections but not currently available > will need OP follow-up PMR  Gentle range of motion with therapy  Patient does have movement of the hand and wrist and do want to encourage that - can still consider resting hand splint intermittently  Skilled PT OT  Optimal skin hygiene    At risk for delirium  Assessment & Plan  - Possible mild confusion at times but overall stable  - Hold baclofen   - Vitals, labs stable   - continue to monitor closely  - impaired sleep on melatonin 6mg HS > continue for now   - Recommend sleep log  - Limit sedating meds when possible/appropriate     Adjustment disorder with mixed emotional features  Assessment & Plan  Adjustment Disorder with Mixed Emotional Features per psych   Supportive counseling  If needed consider medication in future   Psychology consult while in [facility]       Neck pain  Assessment & Plan  Stable   With taut muscle bands in left cervical paraspinal and trapezius areas  Gentle stretching  Hold baclofen for now with recent mild confusion    Bengay helpful  Heat not helpful  Manual therapies/modalities helpful     PRN APAP    Urinary retention  Assessment & Plan  Improved 9/7-8  Required SC x1 9/6 with elevated scan but output was only 350; did have SC 9/4 of 550   - Continue Keflex for 7 day course per chart review and discussion with IM (UA+, 60-60K Proteus (not technically UTI but recommended to treat recently by on-call PMR as this is stone forming organism, also in context of ongoing urinary dysfunction, recent AMS, UTI risk)  - Continue recently started flomax > may need contreras if does not improve prior to d/c   - Continue bladder scan Q4H, monitor output and PVRs, straight cath >450 or if uncomfortable 250-449  - If high SC's in spite of protocol recommend inserting indwelling contreras for bladder rest and future voiding trial  - monitor for retention, incontinence, signs/symptoms of UTI  - ensure optimal bowel management > p/w significant constipation  - recommend toileting program Q2-4 H during day and Q4-6H overnight       DARRIUS (acute kidney injury) [facility])  Assessment & Plan  Remains resolved   Medicine consulted  Hold Lasix and Aldactone - likely decreased intake in setting of CVA   Resume diuretics at discretion of medicine  Monitor labs and output    Cough  Assessment & Plan  9/6-8 - not significant on my exam; lungs remain CTA; pt afebrile   VBS 9/6   - Per SLP - swallow looked good, no aspiration > continue Regular diet   - Patient stated things come up sometimes (but not seen on VBS) - when they panned down esophagus very small amounts of liquid retropulsed a little but then cleared down stomach   Spits out at times and feels like something in throat; intermittent atypical coughing at times  Lung exam remains clear  Saturating well on RA, patient afebrile, without leukocytosis  - Lung exam fairly clear  - CXR 8/25 negative   - Tessalon Perles TID, PRN Robitussin  - PPI for GERD (patient had been on H2 blocker not PPI and we switched today to see if that could help)   - Comgmt with internal medicine   Nystatin per IM for possible mild thrush on tongue and risk of esophageal involvement  - Low threshold to repeat CXR, risk of aspiration but on regular diet per SLP still - IM feels not needed and lung exam unremarkable on my eval  - Did have some consideration for tracheobronchitis earlier and she had 3 days of Azithromycin but IM does not feel this is etiology   - If needed OP GI follow-up         Thrush  Assessment & Plan  Nystatin per IM for possible mild thrush on tongue and risk of esophageal involvement    CAD (coronary artery disease)  Assessment & Plan  IM consulted and with overall management at their discretion during [facility] course  Antithrombotic: Pradaxa  Statin  Optimal blood pressure   Outpatient Cardiology follow-up    Per IM  · "Nonobstructive  · Card cath 8/2021 done for borderline abn stress test and found 50% mid RCA/40% mid LAD = no intervention done  · Continue statin  · Was not on ASA as an OP"       Valvular heart disease  Assessment & Plan  Per IM  · "Current ECHO:  LVEF 65%, mod AR/mild AS, mod TR with severely elevated RV systolic pressure, mild-mod MR, mod LAE/mild NEO  · Euvolemic currently but will watch since CTA on admission showed pulm edema and b/l pleural effusions"  -diuretics at their discretion   -Outpatient cardiology follow-up    At risk for venous thromboembolism (VTE)  Assessment & Plan  SCDs, ambulation, and Pradaxa       Pacemaker  Assessment & Plan  Not MRI compatible    Chronic atrial fibrillation ([address] )  Assessment & Plan  IM consulted and with overall management at their discretion during [facility] course  Rate control: Verapamil, Nebivolol   Antithrombotic: Pradaxa       Stage 3 chronic kidney disease [facility])  Assessment & Plan  Lab Results   Component Value Date    EGFR 45 09/05/2022    EGFR 44 09/02/2022    EGFR 50 09/01/2022    CREATININE 1 12 09/05/2022    CREATININE 1 14 09/02/2022    CREATININE 1 04 09/01/2022     Stable   Given IVF earlier in course   Avoid nephrotoxic meds  Monitor voiding status  IM consulted       Hyperlipidemia LDL goal <100  Assessment & Plan  Statin    Essential hypertension  Assessment & Plan  Internal medicine consulted and co-management with their service  Monitor vitals with and without activity; monitor for orthostasis  Monitor hemoglobin, electrolytes, kidney function, hydration status   Current meds:  Losartan, nebivolol, Verapamil 240mg daily  - Lasix and Aldactone on hold      Other chest pain  Assessment & Plan  Denies CP currently - remains resolved    Per prior provider  "8/25 with chest pain, ultimately felt to be 2/2 to spasticity  Improved with baclofen/robaxin  - delta hsTrop at 2 hr was -1    - EKG showed paced rhythm, no evidence of ischemia   - CXR unremarkable   - 30 min later had another episode that felt more like pressure  Rapid called, but not concerning for ACS  Generally reproducible   - Increased Baclofen and frequency of Theta Hemp  Has a cough with it, and lungs sound good, and CXR unremarkable  - May also be component of GERD  Started on pepcid  Also on throat spray  Fluticasone was irritating and didn't make a difference    - Had SLP eval swallow - which was fine"      Other Medical Issues:   None    Follow-up providers and other issues to be followed up after discharge  PCP   Neurology   Cardiology    CODE: Level 1: Full Code    Restrictions include: Fall precautions    Objective: Allergies per EMR  Diagnostic Studies: Reviewed, no new imaging  CT head wo contrast   Final Result by Solomon Negrete DO (09/07 2016)   Mildly improved gray-white matter differentiation right MCA territory compared to August 15  No hemorrhagic conversion, laminar necrosis, or mass lesion  Workstation performed: [id number]         FL barium swallow video w speech   Final Result by [employer], DOCUMENTATION (09/06 7852)      XR chest portable   Final Result by Avelino Donato MD (33/66 [id number])      No acute cardiopulmonary disease                    Workstation performed: [id number]           See above as well    Laboratory: Labs reviewed  Results from last 7 days   Lab Units 09/07/22  1507 09/05/22  0428 09/02/22  1549   HEMOGLOBIN g/dL 11 5 10 7* 11 3*   HEMATOCRIT % 35 0 34 5* 34 6*   WBC Thousand/uL 7 16 5 88 8 19     Results from last 7 days   Lab Units 09/07/22  1507 09/05/22  0428 09/02/22  1549   BUN mg/dL 17 29* 26*   POTASSIUM mmol/L 4 1 4 0 4 0   CHLORIDE mmol/L 106 107 106   CREATININE mg/dL 0 92 1 12 1 14   AST U/L 20  --   --    ALT U/L 21  --   --               Drug regimen reviewed, all potential adverse effects identified and addressed:    Scheduled Meds:  Current Facility-Administered Medications   Medication Dose Route Frequency Provider Last Rate    acetaminophen  650 mg Oral Q6H PRN Theodora Acevedo MD      benzonatate  200 mg Oral TID TOMY Fisher      bisacodyl  10 mg Rectal Daily PRN Theodora Acevedo MD      cephalexin  500 mg Oral Q6H [facility] Theodora Acevedo MD      dabigatran etexilate  150 mg Oral Q12H [facility] Theodora Acevedo, MD      guaiFENesin  600 mg Oral Q12H [facility] [place], TOMY      losartan  50 mg Oral Daily Bobby Rahul [place], TOMY      magnesium oxide  400 mg Oral Daily Theodora Curtis, MD      melatonin  6 mg Oral QPM Rose Milian, DO      menthol-methyl salicylate   Apply externally BID Akshat Spear MD      nebivolol  5 mg Oral Daily [place] Acevedo MD      nitroglycerin  0 4 mg Sublingual Q5 Min PRN TOMY Fisher      nystatin  500,000 Units Swish & Swallow 4x Daily [place] Rah[place], TOMY      pantoprazole  40 mg Oral Early Morning Theodora Acevedo MD      phenol  1 spray Mouth/Throat Q2H PRN Akshat Spear MD      polyethylene glycol  17 g Oral Daily PRN Theodora Acevedo MD      pravastatin  40 mg Oral Daily With Adri Perry MD      tamsulosin  0 4 mg Oral Daily With Bebeto Medina MD      verapamil  240 mg Oral HS Theodora Acevedo MD           acetaminophen    bisacodyl    nitroglycerin    phenol    polyethylene glycol      Chief Complaints:   Anxious about discharge     Subjective:     Patient reports some anxiety regarding difficulty finding a new place other than her 9th floor apartment  She notes that at night she felt transiently like when she was typing on her phone that it was affecting her TV but she new that was not true  She denies uncontrolled anxiety, depression, hallucinations, visual changes, or worsening strength or sensation  She notes still occasionally getting slight head shaking randoming without confusion or associated symptoms  She denies urinary problems or other complaints  ROS: A 10 point ROS was performed; negative except as noted above  Physical Exam:  Vitals:    09/08/22 2138   BP: 128/83   Pulse: 67   Resp: 16   Temp: 98 1 °F (36 7 °C)   SpO2: 97%       GEN:  Lying in bed in NAD   HEENT/NECK: MMM  CARDIAC: Regular rate rhythm, no murmers, no rubs, no gallops  LUNGS:  clear to auscultation, no wheezes, rales, or rhonchi  ABDOMEN: Soft, non-tender, non-distended, normal active bowel sounds  EXTREMITIES/SKIN:  no calf edema, no calf tenderness to palpation  NEURO:   MENTAL STATUS: awake, oriented to person, place, time, and situation, MENTAL STATUS:  Appropriate wakefulness and interaction , CN II-XII: Mild L facial weakness otherwise intact, Strength/MMT:  LUE prox 3-, distal 4, LLE prox 2, distal trace, R sided 5/5; FTN intact - unchanged and No clear significant word substitutions during my encounter; no head shaking, when she got anxious she got somewhat tremulous transiently  PSYCH:  Affect:  Mildly anxious at times    HPI:  69-year-old female with a past medical history of AFib recently on Xarelto, sick sinus syndrome status status post pacemaker placement that is not MRI compatible, hypertension, hyperlipidemia, valvular disease, coronary artery disease developed dense left-sided weakness, dysarthria and presented to the hospital on 08/12/2022  She had systolic blood pressure of 200 on arrival   CT head showed focal area of gliosis within the high right post central gyrus and scattered chronic microvascular ischemic changes with more focal lucency along the anterior limb of the right internal capsule    CTA of head and neck showed near occlusive thrombus at the right MCA bifurcation, mild beating of the mid to distal ICA suspicious mild fibromuscular dysplasia and mild atherosclerotic disease at the bilateral distal carotid arteries  She was not a candidate for MRI due to pacemaker  CT cerebral perfusion study showed 8 mL of mismatch  She was not a candidate for tPA due to bleeding risk from being on Xarelto  She was not a thrombectomy candidate for neurointerventional   Patient was recommended to transition to Pradaxa  Patient was evaluated by skilled therapies and was found to have significant decline in ADLs and ambulation and appears appropriate for admission to [place] [address]  ** Please Note: Fluency Direct voice to text software may have been used in the creation of this document  **    35 minutes or greater spent face-to-face with patient during this encounter and with coordination of care  Extended discussion today held with patient (and son) in preparation for discharge regarding regarding current condition, medical/rehabilitation management, medications prescribed at discharge, functional limitations, safety, and follow-up/disposition

## 2022-09-08 NOTE — PROGRESS NOTES
[facility] Occupational Therapy Daily Note  Patient Active Problem List   Diagnosis    Other chest pain    Essential hypertension    Hyperlipidemia LDL goal <100    Stage 3 chronic kidney disease (HCC)    Osteoarthritis of both wrists    Chronic atrial fibrillation (HCC)    Tubulovillous adenoma    Gastroesophageal reflux disease without esophagitis    Pacemaker    B12 deficiency    Allergic rhinitis due to pollen    Cavus deformity of foot    Midline cystocele    Hallux abducto valgus, bilateral    Left renal artery stenosis (HCC)    Osteoarthritis of hip    Osteopenia    Pain due to onychomycosis of toenails of both feet    Left atrial enlargement    Lipoma of right upper extremity    Diverticulosis of colon    Abnormal nuclear stress test    Renal insufficiency    Arterial ischemic stroke, MCA (middle cerebral artery), right, acute ([address] )    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 ([address] )    Anemia    At risk for venous thromboembolism (VTE)    DARRIUS (acute kidney injury) ([address] )    Spastic hemiparesis of left dominant side as late effect of cerebral infarction ([address] )    Valvular heart disease    CAD (coronary artery disease)    Urinary retention    Neck pain    Healthcare maintenance    At risk for coping difficulty    Cough    Thrush    At risk for delirium       Past Medical History:   Diagnosis Date    A-fib (HCC)     Anemia     Arthritis     Breast pain, right     Chest pain on breathing     Colon polyp     Cough     Diverticulosis     Encounter for screening colonoscopy     H/O sick sinus syndrome     Heart murmur     High cholesterol     History of atrial fibrillation     Rate ontrolled  On xarelto 15mg (creatinine 50) Followed by Dr Katie Echevarria with Cardiology     History of weight loss     Report loose fitting clothes  Weight stable (3lbs difference)  Last colo showed small tubular adenoma x2  Breast biopsy last showed fibrocystic changes  TSH wnl  Will check cbc, bmp, spep          Hx of long term use of blood thinners     Hypertension     Irregular heart beat     Pacemaker     Preop examination     Shortness of breath     Viral bronchitis      Etiologic Diagnosis: right mca ischemic cva  Restrictions/Precautions  Precautions: Aspiration, Bed/chair alarms, Cognitive, Fall Risk, Supervision on toilet/commode  Weight Bearing Restrictions: No  ROM Restrictions: No  Braces or Orthoses: AFO  ADL Team Goal: Patient will be independent with ADLs with least restrictive device upon completion of rehab program  Occupational Therapy LTG's  Eating Oral care Bathing LB dress UB dress   Eating Goal: 06  Independent - Patient completes the activity by him/herself with no assistance from a helper  Oral Hygiene Goal: 06  Independent - Patient completes the activity by him/herself with no assistance from a helper  Shower/bathe self Goal: 06  Independent - Patient completes the activity by him/herself with no assistance from a helper  Lower body dressing Goal: 06  Independent - Patient completes the activity by him/herself with no assistance from a helper  Upper body dressing Goal: 06  Independent - Patient completes the activity by him/herself with no assistance from a helper  Toileting Toilet txf Func txf IADL Med    Toileting hygiene Goal: 06  Independent - Patient completes the activity by him/herself with no assistance from a helper  Toilet transfer Goal: 06  Independent - Patient completes the activity by him/herself with no assistance from a helper           OT interventions: Treatment/Interventions: ADL retraining, Functional transfer training, LE strengthening/ROM, Therapeutic exercise, Endurance training, Cognitive reorientation, Patient/family training, Equipment eval/education, Bed mobility, Compensatory technique education  Discharge Plan:  OT Discharge Recommendation:  (24/7 assist) TBD  DME: [facility]     09/08/22 1400   Pain Assessment   Pain Assessment Tool 0-10   Pain Score No Pain   Lifestyle   Autonomy "see I am shaking "   Activity Tolerance   Activity Tolerance Patient tolerated treatment well   Assessment   Treatment Assessment Pt engages in skilled OT session focusing on L UE NMR  Pt supine in bed with focus on L UE AROM/AAROM  Facilitation for scapular unit mobility during shoulder ROM  During session pt c/o increasing discomfort in L LE in bed with spasticity increasing causing knee flexion  Pt stating that multi podus boot is more uncomfortable  Comfort was increased with pillow to float heel  Near end of session pt had difficulty with passing secretions with coughing  Pt then proceeded to have R LE tremoring with increasing rigidity in L UE  Pt respirations increasing as appearing to be anxious  This occurred twice  Reduction in tremoring noted with cues for deep breathing techniques  Pt reported no pain during "episode " RN notified at this time of event  After deep breathing techniques completed pt reporting feeling better     Prognosis Fair   OT Therapy Minutes   OT Time In 1400   OT Time Out 1430   OT Total Time (minutes) 30   OT Mode of treatment - Individual (minutes) 30   OT Mode of treatment - Concurrent (minutes) 0   OT Mode of treatment - Group (minutes) 0   OT Mode of treatment - Co-treat (minutes) 0   OT Mode of Treatment - Total time(minutes) 30 minutes   OT Cumulative Minutes [id number]

## 2022-09-08 NOTE — PROGRESS NOTES
09/08/22 0830   Pain Assessment   Pain Assessment Tool 0-10   Pain Score No Pain   Restrictions/Precautions   Precautions Aspiration;Bed/chair alarms;Cognitive; Fall Risk;Supervision on toilet/commode   Weight Bearing Restrictions No   ROM Restrictions No   Braces or Orthoses AFO   Subjective   Subjective Pt lying supine w/ HOB elevated upon PT entry  Pt wearing just hospital gown   Shirt, underwear, pants, AFO, sock, and shoes donned by PT  pt reports feeling better today than yesterday, and felt ready for therapy   Roll Left and Right   Type of Assistance Needed Supervision   Physical Assistance Level No physical assistance   Comment Pt used BL bed rails to roll onto sides   Roll Left and Right CARE Score 4   Sit to Lying   Type of Assistance Needed Physical assistance   Physical Assistance Level 26%-50%   Comment Pt required modAx1 for LE elevation into bed   Sit to Lying CARE Score 3   Lying to Sitting on Side of Bed   Type of Assistance Needed Physical assistance   Physical Assistance Level 25% or less   Comment used bed rails to sit up, but requied minAx1 to lift L LE out of bed   Lying to Sitting on Side of Bed CARE Score 3   Sit to Stand   Type of Assistance Needed Physical assistance   Physical Assistance Level 51%-75%   Comment mod Ax1, however when pt used hand rail to pull herself up, it becomes MinAx1   Sit to Stand CARE Score 2   Bed-Chair Transfer   Type of Assistance Needed Physical assistance   Physical Assistance Level 51%-75%   Comment mod Ax1 for stand pivot trxr, VC required to promote proper L LE positioning   Chair/Bed-to-Chair Transfer CARE Score 2   Walk 10 Feet   Type of Assistance Needed Physical assistance   Physical Assistance Level 26%-50%   Comment UL HHA   Walk 10 Feet CARE Score 3   Walk 50 Feet with Two Turns   Type of Assistance Needed Physical assistance   Physical Assistance Level 26%-50%   Comment UL HHA   Walk 50 Feet with Two Turns CARE Score 3   Ambulation   Does the patient walk? 2  Yes   Primary Mode of Locomotion Prior to Admission Walk   Distance Walked (feet) 100 ft  (10ft x2, 50ftx2, 100ft x2)   Assist Device Hand Hold;Roller Walker   Gait Pattern Inconsistant Kristan; Slow Kristan;Decreased foot clearance;L foot drag;Narrow LUCY;Scissoring; Step to;Decreased L stance; Improper weight shift   Limitations Noted In Balance; Coordination; Endurance; Heel Strike; Safety; Sequencing;Swing;Strength;Speed   Provided Assistance with: Balance;Weight Shift   Walk Assist Level Moderate Assist   Findings UL HHA provided to encourage proper weight shift and advancement of L LE; BL HHA provided to encourage increased kristan and foot clearence of LLE  RW trialed for 100ft, slow kristan, increased L foot drag   Wheelchair mobility   Findings not focus of session   Curb or Single Stair   Style negotiated Single stair   Type of Assistance Needed Physical assistance   Physical Assistance Level Total assistance   Comment BL HRs, step up/down  VC provided for L limb advacement and sequencing  Eventually needed PT assistance to step up, min Ax1, and assistance to step down, mod Ax1    1 Step (Curb) CARE Score 1   Therapeutic Interventions   Flexibility Hamstring stretch provided to decrease tone, mild clonus noted w/ prolonged stretching x2 min   Neuromuscular Re-Education 6in alternating step taps 2x2 min to focus on foot clearance, requires modAx1 for step up and max Ax1 for step down for LLE; passive D1 and D2 PNF patterns provided for L LE x5min   Assessment   Treatment Assessment pt participated in 60 min skilled therapy session with a focus on amb and stair training  Pt amb well with UL HHA so RW was introduced as it is more functional  However, pt presetned w/ increased L foot drag and decreased kristan so RW discontinued for rest of session due to this  Low load prolonged stretch applied to L LE in order to decrease tone   Intervention was succesful in diminishing tone, however clonus still present with prolonged stretch and noted clonus during amb trials as well  Pt struggled w/ L LE elevation and sequencing during stair training and required PT assistance (VC, TC, mod-maxAx1) to step up and down w/ L LE  Pt struggled with problem solving and motor planning this session  Spent time educating pt on importance of internal cueing rather than constant VC from therapist  Overall, pt tolerated session fair and would benefit from continued PT services to address barriers to discharge, including gait deficits, balance problems and stair navigation  Look to introduce VOR and visual tracking interventions to address impairments listed previously  Problem List Decreased strength;Decreased range of motion;Decreased endurance; Impaired balance;Decreased mobility; Decreased coordination;Decreased cognition; Impaired judgement;Decreased safety awareness; Impaired vision; Impaired tone;Pain   PT Barriers   Functional Limitation Car transfers; Ramp negotiation;Stair negotiation;Standing;Transfers; Walking; Wheelchair management   Plan   Treatment/Interventions Functional transfer training;LE strengthening/ROM; Elevations; Therapeutic exercise; Endurance training;Patient/family training;Bed mobility;Gait training; Compensatory technique education   Progress Slow progress, decreased activity tolerance   PT Therapy Minutes   PT Time In 0830   PT Time Out 0930   PT Total Time (minutes) 60   PT Mode of treatment - Individual (minutes) 60   PT Mode of treatment - Concurrent (minutes) 0   PT Mode of treatment - Group (minutes) 0   PT Mode of treatment - Co-treat (minutes) 0   PT Mode of Treatment - Total time(minutes) 60 minutes   PT Cumulative Minutes 1645   Therapy Time missed   Time missed?  No   Amount of time missed 0

## 2022-09-08 NOTE — PROGRESS NOTES
Internal Medicine Progress Note  Patient: Deepa Garrett  Age/sex: 80 y o  female  Medical Record #: [id number]      ASSESSMENT/PLAN: (Interval History)  Deepa Garrett is seen and examined and management for following issues:    Acute embolic right MCA CVA  · Had near occlusion of right MCA  · Felt to be a Xarelto failure and was switched to Pradaxa  · ECHO w/o thrombus  · Continue statin  · On Baclofen for tone = much improved   · On 9/2, 9/5 and 9/6 states she is saying a specific word that means nothing  She will repeat a particular word that is not really a word and she knows it  Jordyn Ballard speaks fluently about it at the same time  MARIA L MORALES  [facility] neuro exam has been unchanged   Dr Sveta Christianson d/w Neuro on 9/5 and they felt sx don't seem to be neurological = med related?   Neuro saw and low suspicion of seizure  EEG pending  Head CT negative for new findings      Chronic atrial fibrillation  · Sees Dr Tish Ramos as OP  · Rates controlled  · Cont Pradaxa/Bystolic     PPM  · VVI  · Is not MRI conditional  · 100% paced on EKG     Valvular disease  · Current ECHO:  LVEF 65%, mod AR/mild AS, mod TR with severely elevated RV systolic pressure, mild-mod MR, mod LAE/mild NEO  · Euvolemic currently     HTN  · Home:  Verapamil 240mg qhs/Cozaar 339 mg qd/Bystolic 5mg qd/lasix 67FO qd/Aldactone 25mg qd  · Here:  Verapamil 240mg qhs/Cozaar 41BE qd/Bystolic 5mg qd  · OP note states has left RA stenosis but no testing in OP records or Care Everywhere to verify  · BP was getting a bit soft at times so starting 9/6, reduced Cozaar to 50mg qd and has improved  · No changes today     HLD  · Home:  Crestor 10mg qd = Neuro rec when discharged to reduce to 5mg qd  · Here:  Pravachol 40mg qd     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  · S/p NS 1 liter   · Back to baseline = as OP GFR 50's to 60's    GFR here 40's to 60's  · Continue to hold Lasix and Aldactone for now     CAD  · Nonobstructive  · Card cath 8/2021 done for borderline abn stress test and found 50% mid RCA/40% mid LAD = no intervention done  · Continue statin  · Was not on ASA as an OP     Hypomagnesemia  · Cont 400 mg MagOx  · stable     Left chest pain  · Had left neck pain (not new for her) then pain under left breast 8/31/22 = reproducible with palpation  · Cardiac w/u negative  · Etio was her left arm spasticity   · 2/2 developing odd behaviors over her time here in [facility] so Baclofen reduced 9/5 to 5mg TID      Cough   · CXR negative 8/25  · Has scheduled Tessalon perles 200mg TID/Chlorarseptic spray/pepcid/Mucinex  · Started z-pack 9/2 in case has tracheobronchitis which she had a x3 days and stopped when Keflex started by PMR for asymp bacteruria   · Flonase was not effective  · Cozaar likely not the cause since has been on for a long time  · Says feels like something in throat and constantly spits out her saliva = per ST has no previous issues eating/drinking but since no improvement --> D/W Dr Michelle Ayoub and ST --> s/p VBS 9/6 w/o acute findings  · May have some thrush as below but overall likely behavioral     Possible thrush  · On tongue patel have some mild thrush  · If so, then could have some esophagitis from it as well  · Continue empiric Mycostatin      Urine retention  · Getting str cath'd  · Management per PMR  · Flomax added on 8/31/22  · Urine culture is >60,000-69,000 proteus = no sx but PMR tx'ing with Keflex empirically     Vaginal yeast infection  · S/p Monistat pack/Diflucan 150mg x 1         DC planning: Reteam       The above assessment and plan was reviewed and updated as determined by my evaluation of the patient on 9/8/2022      Labs:   Results from last 7 days   Lab Units 09/07/22  1507 09/05/22  0428   WBC Thousand/uL 7 16 5 88   HEMOGLOBIN g/dL 11 5 10 7*   HEMATOCRIT % 35 0 34 5*   PLATELETS Thousands/uL 237 227     Results from last 7 days   Lab Units 09/07/22  1507 09/05/22  0428   SODIUM mmol/L 136 137   POTASSIUM mmol/L 4 1 4 0   CHLORIDE mmol/L 106 107   CO2 mmol/L 23 24   BUN mg/dL 17 29*   CREATININE mg/dL 0 92 1 12   CALCIUM mg/dL 9 4 9 2                   Review of Scheduled Meds:  Current Facility-Administered Medications   Medication Dose Route Frequency Provider Last Rate    acetaminophen  650 mg Oral Q6H PRN Justice Rocha MD      baclofen  5 mg Oral TID Shama Monas, DO      benzonatate  200 mg Oral TID TOMY Mcclure      bisacodyl  10 mg Rectal Daily PRN Justice Rocha MD      cephalexin  500 mg Oral Q6H [facility] Justice Rocha MD      dabigatran etexilate  150 mg Oral Q12H [facility] Justice Rocha MD      guaiFENesin  600 mg Oral Q12H [facility], CRNP      losartan  50 mg Oral Daily [facility], CRNP      magnesium oxide  400 mg Oral Daily Justice Rocha MD      melatonin  6 mg Oral QPM Shama Monas, DO      menthol-methyl salicylate   Apply externally BID Neville Dunn MD      nebivolol  5 mg Oral Daily Justice Rocha MD      nitroglycerin  0 4 mg Sublingual Q5 Min PRN TOMY Mcclure      nystatin  500,000 Units Swish & Swallow 4x Daily [facility], CRNP      pantoprazole  40 mg Oral Early Morning Jusitce Rocha MD      phenol  1 spray Mouth/Throat Q2H PRN Neville Dunn MD      polyethylene glycol  17 g Oral Daily PRN Justice Rocha MD      pravastatin  40 mg Oral Daily With Paris Canales MD      tamsulosin  0 4 mg Oral Daily With Beryle Cradle, MD      verapamil  240 mg Oral HS Justice Rocha MD         Subjective/ HPI: Patient seen and examined  Patients overnight issues or events were reviewed with nursing or staff during rounds or morning huddle session  New or overnight issues include the following:     Pt seen in OT  Her son is concerned about another episode she had last night  Reviewed head CT results  EEG done but read pending  Pt denies any current complaints  ROS:   A 10 point ROS was performed; negative except as noted above         Imaging:     CT head wo contrast   Final Result by Daphne Thompson DO (09/07 2016)   Mildly improved gray-white matter differentiation right MCA territory compared to August 15  No hemorrhagic conversion, laminar necrosis, or mass lesion  Workstation performed: [id number]         FL barium swallow video w speech   Final Result by ARTEMIO GONZALEZ (09/06 1551)      XR chest portable   Final Result by Jess Morillo MD (04/34 0704)      No acute cardiopulmonary disease  Workstation performed: [id number]             *Labs /Radiology studies reviewed  *Medications reviewed and reconciled as needed  *Please refer to order section for additional ordered labs studies  *Case discussed with primary attending during morning huddle case rounds    Physical Examination:  Vitals:   Vitals:    09/07/22 0900 09/07/22 1329 09/07/22 2059 09/08/22 0530   BP: 135/55 120/65 151/64 126/58   BP Location: Right arm Right arm Right arm Right arm   Pulse: 78 62 80 60   Resp:  20 18 18   Temp:  98 1 °F (36 7 °C) 98 1 °F (36 7 °C) 97 8 °F (36 6 °C)   TempSrc:  Oral Oral Oral   SpO2:  96% 97% 95%   Weight:       Height:           General Appearance: no distress, conversive  HEENT: PERRLA, conjuctiva normal; oropharynx clear; mucous membranes moist   Neck:  Supple, normal ROM  Lungs: CTA, normal respiratory effort, no retractions, expiratory effort normal  CV: regular rate and rhythm; no rubs/murmurs/gallops, PMI normal   ABD: soft; ND/NT; +BS  EXT: no edema  Skin: normal turgor, normal texture, no rashes  Psych: affect normal, mood normal  Neuro: AA      The above physical exam was reviewed and updated as determined by my evaluation of the patient on 9/8/2022  Invasive Devices  Report    None                    VTE Pharmacologic Prophylaxis: Pradaxa  Code Status: Level 1 - Full Code  Current Length of Stay: 20 day(s)      Total time spent:  30 minutes with more than 50% spent counseling/coordinating care   Counseling includes discussion with patient re: progress  and discussion with patient of his/her current medical state/information  Coordination of patient's care was performed in conjunction with primary service  Time invested included review of patient's labs, vitals, and management of their comorbidities with continued monitoring  In addition, this patient was discussed with medical team including physician and advanced extenders  The care of the patient was extensively discussed and appropriate treatment plan was formulated unique for this patient  ** Please Note:  voice to text software may have been used in the creation of this document   Although proof errors in transcription or interpretation are a potential of such software**

## 2022-09-09 LAB
FLUAV RNA RESP QL NAA+PROBE: NEGATIVE
FLUBV RNA RESP QL NAA+PROBE: NEGATIVE
GLUCOSE SERPL-MCNC: 91 MG/DL (ref 65–140)
RSV RNA RESP QL NAA+PROBE: NEGATIVE
SARS-COV-2 RNA RESP QL NAA+PROBE: NEGATIVE

## 2022-09-09 PROCEDURE — 97530 THERAPEUTIC ACTIVITIES: CPT

## 2022-09-09 PROCEDURE — 91300 COVID-19 PFIZER VAC BIVALENT TRIS-SUCROSE 30 MCG/0.3 ML SUSP: CPT

## 2022-09-09 PROCEDURE — 0241U HB NFCT DS VIR RESP RNA 4 TRGT: CPT

## 2022-09-09 PROCEDURE — 97112 NEUROMUSCULAR REEDUCATION: CPT

## 2022-09-09 PROCEDURE — XW023W7 INTRODUCTION OF COVID-19 VACCINE BOOSTER INTO MUSCLE, PERCUTANEOUS APPROACH, NEW TECHNOLOGY GROUP 7: ICD-10-PCS

## 2022-09-09 PROCEDURE — 82948 REAGENT STRIP/BLOOD GLUCOSE: CPT

## 2022-09-09 PROCEDURE — 99232 SBSQ HOSP IP/OBS MODERATE 35: CPT | Performed by: INTERNAL MEDICINE

## 2022-09-09 PROCEDURE — 0001A HB IMMUNIZATION ADMIN COVID-19 30MCG/0.3ML FIRST DOSE: CPT

## 2022-09-09 PROCEDURE — 99232 SBSQ HOSP IP/OBS MODERATE 35: CPT

## 2022-09-09 PROCEDURE — 97535 SELF CARE MNGMENT TRAINING: CPT

## 2022-09-09 RX ADMIN — CEPHALEXIN 500 MG: 500 CAPSULE ORAL at 18:18

## 2022-09-09 RX ADMIN — PRAVASTATIN SODIUM 40 MG: 40 TABLET ORAL at 15:43

## 2022-09-09 RX ADMIN — CEPHALEXIN 500 MG: 500 CAPSULE ORAL at 06:51

## 2022-09-09 RX ADMIN — MENTHOL, METHYL SALICYLATE 1 APPLICATION: 10; 15 CREAM TOPICAL at 18:18

## 2022-09-09 RX ADMIN — BENZONATATE 200 MG: 100 CAPSULE ORAL at 21:27

## 2022-09-09 RX ADMIN — CEPHALEXIN 500 MG: 500 CAPSULE ORAL at 11:41

## 2022-09-09 RX ADMIN — ACETAMINOPHEN 650 MG: 325 TABLET ORAL at 02:07

## 2022-09-09 RX ADMIN — NYSTATIN 500000 UNITS: 100000 SUSPENSION ORAL at 07:05

## 2022-09-09 RX ADMIN — NYSTATIN 500000 UNITS: 100000 SUSPENSION ORAL at 18:17

## 2022-09-09 RX ADMIN — BNT162B2 ORIGINAL AND OMICRON BA.4/BA.5 0.3 ML: .1125; .1125 INJECTION, SUSPENSION INTRAMUSCULAR at 15:41

## 2022-09-09 RX ADMIN — PANTOPRAZOLE SODIUM 40 MG: 40 TABLET, DELAYED RELEASE ORAL at 06:51

## 2022-09-09 RX ADMIN — NYSTATIN 500000 UNITS: 100000 SUSPENSION ORAL at 21:27

## 2022-09-09 RX ADMIN — TAMSULOSIN HYDROCHLORIDE 0.4 MG: 0.4 CAPSULE ORAL at 15:43

## 2022-09-09 RX ADMIN — GUAIFENESIN 600 MG: 600 TABLET, EXTENDED RELEASE ORAL at 08:52

## 2022-09-09 RX ADMIN — VERAPAMIL HYDROCHLORIDE 240 MG: 240 TABLET ORAL at 21:27

## 2022-09-09 RX ADMIN — BENZONATATE 200 MG: 100 CAPSULE ORAL at 15:44

## 2022-09-09 RX ADMIN — DABIGATRAN ETEXILATE MESYLATE 150 MG: 150 CAPSULE ORAL at 08:52

## 2022-09-09 RX ADMIN — NYSTATIN 500000 UNITS: 100000 SUSPENSION ORAL at 11:41

## 2022-09-09 RX ADMIN — BENZONATATE 200 MG: 100 CAPSULE ORAL at 08:52

## 2022-09-09 RX ADMIN — CEPHALEXIN 500 MG: 500 CAPSULE ORAL at 00:17

## 2022-09-09 RX ADMIN — GUAIFENESIN 600 MG: 600 TABLET, EXTENDED RELEASE ORAL at 21:27

## 2022-09-09 RX ADMIN — DABIGATRAN ETEXILATE MESYLATE 150 MG: 150 CAPSULE ORAL at 21:27

## 2022-09-09 RX ADMIN — Medication 6 MG: at 21:27

## 2022-09-09 RX ADMIN — MENTHOL, METHYL SALICYLATE 1 APPLICATION: 10; 15 CREAM TOPICAL at 08:52

## 2022-09-09 RX ADMIN — MAGNESIUM OXIDE TAB 400 MG (241.3 MG ELEMENTAL MG) 400 MG: 400 (241.3 MG) TAB at 08:52

## 2022-09-09 RX ADMIN — NEBIVOLOL 5 MG: 5 TABLET ORAL at 08:52

## 2022-09-09 NOTE — PROGRESS NOTES
09/09/22 1400   Pain Assessment   Pain Assessment Tool 0-10   Pain Score No Pain   Restrictions/Precautions   Precautions Aspiration;Bed/chair alarms;Cognitive; Fall Risk;Supervision on toilet/commode   Weight Bearing Restrictions No   ROM Restrictions No   Braces or Orthoses AFO  (LLE)   Cognition   Overall Cognitive Status Impaired   Arousal/Participation Alert; Cooperative   Attention Attends with cues to redirect   Orientation Level Oriented X4   Memory Decreased short term memory;Decreased recall of recent events   Following Commands Follows one step commands with increased time or repetition   Roll Left and Right   Type of Assistance Needed Supervision; Adaptive equipment   Comment heavy reliance of bed rail   Roll Left and Right CARE Score 4   Sit to Lying   Type of Assistance Needed Physical assistance; Adaptive equipment;Verbal cues   Physical Assistance Level 26%-50%   Comment A to LLE with bed rail use   Sit to Lying CARE Score 3   Lying to Sitting on Side of Bed   Type of Assistance Needed Physical assistance;Verbal cues; Adaptive equipment   Physical Assistance Level 25% or less   Comment utilizing bed rail   Lying to Sitting on Side of Bed CARE Score 3   Sit to Stand   Type of Assistance Needed Physical assistance;Verbal cues   Physical Assistance Level 26%-50%   Comment mild retropulsion this session with VC's for hand placement   Sit to Stand CARE Score 3   Bed-Chair Transfer   Type of Assistance Needed Physical assistance;Verbal cues   Physical Assistance Level 26%-50%   Comment SPT/sit pivots transfers to R   Chair/Bed-to-Chair Transfer CARE Score 3   Transfer Bed/Chair/Wheelchair   Adaptive Equipment Hand Hold   Car Transfer   Type of Assistance Needed Physical assistance;Verbal cues; Adaptive equipment   Physical Assistance Level 26%-50%   Comment MAX VC's for sequencing A with LLE and weight shifting on chair, simulated on NuStep with bolster     Car Transfer CARE Score 3   Walk 10 Feet   Type of Assistance Needed Physical assistance;Verbal cues; Adaptive equipment   Physical Assistance Level 26%-50%   Comment R HHA and RW   Walk 10 Feet CARE Score 3   Walk 50 Feet with Two Turns   Type of Assistance Needed Physical assistance;Verbal cues; Adaptive equipment   Physical Assistance Level Total assistance   Comment R HHA and RW, WC follow for safety   Walk 50 Feet with Two Turns CARE Score 1   Walk 150 Feet   Type of Assistance Needed Physical assistance;Verbal cues; Adaptive equipment   Physical Assistance Level Total assistance   Comment R HHA and RW, WC follow for safety   Walk 150 Feet CARE Score 1   Walking 10 Feet on Uneven Surfaces   Type of Assistance Needed Physical assistance;Verbal cues   Physical Assistance Level 26%-50%   Comment HHA over floor mat   Walking 10 Feet on Uneven Surfaces CARE Score 3   Ambulation   Does the patient walk? 2  Yes   Primary Mode of Locomotion Prior to Admission Walk   Distance Walked (feet) 175 ft  (200' x2, 150')   Assist Device [facility]   Gait Pattern Inconsistant Linda; Slow Linda;Decreased foot clearance;L foot drag;L hemiparesis; Narrow LUCY;Shuffle;Step to;Decreased L stance; Improper weight shift   Limitations Noted In Balance; Coordination;Device Management; Endurance; Heel Strike;Midline Orientation;Posture; Safety;Speed;Strength   Provided Assistance with: Balance;Weight Shift   Walk Assist Level Moderate Assist;Minimum Assist   Findings with HHA to R MIN/MODA with A to weight shift, with RW more MODA as pt required hlep with RW management at times  Wheel 50 Feet with Two Turns   Type of Assistance Needed Physical assistance;Verbal cues   Physical Assistance Level 26%-50%   Wheel 50 Feet with Two Turns CARE Score 3   Wheel 150 Feet   Type of Assistance Needed Physical assistance;Verbal cues   Physical Assistance Level 26%-50%   Wheel 150 Feet CARE Score 3   Wheelchair mobility   Does the patient use a wheelchair? 1  Yes   Type of Wheelchair Used 1  Manual   Method Right upper extremity;Right lower extremity   Assistance Provided For Locking Brakes;Obstacles;Replace Leg Rest;Remove Leg Rest;Remove armrests;Replace armrests   Distance Level Surface (feet) 150 ft   Picking Up Object   Type of Assistance Needed Physical assistance;Verbal cues; Adaptive equipment   Physical Assistance Level 26%-50%   Comment with RW and reacher   Picking Up Object CARE Score 3   Toilet Transfer   Type of Assistance Needed Physical assistance;Verbal cues; Adaptive equipment   Physical Assistance Level 26%-50%   Comment SPT with use of grab bar, A with hygiene and clothing management   Toilet Transfer CARE Score 3   Therapeutic Interventions   Flexibility L HS, HC and hip ADD stretch   Equipment Use   NuStep x10 min L1   Assessment   Treatment Assessment Pt participated in skilled PT session with increased focus on NPP gait and gait with RW  Pt noted improved dong of LLE with all gait but mild ext rot to LLE when amb with RW  Pt anticipates discharge tomorrow to SNF for cont rehab services as cyril remains high risk for falls and was home I PTA  Pt will cont to benefit from increased gait with use of RW, improved functional transfers and increased safety awareness to decrease burden of care at discharge  Cont POC as tolerated  Problem List Decreased strength;Decreased endurance;Decreased mobility; Impaired balance;Decreased coordination; Impaired judgement;Decreased safety awareness;Decreased cognition; Impaired tone   Barriers to Discharge Inaccessible home environment;Decreased caregiver support   PT Barriers   Functional Limitation Car transfers;Stair negotiation;Standing;Transfers; Walking; Wheelchair management   Plan   Treatment/Interventions Functional transfer training;LE strengthening/ROM; Therapeutic exercise; Endurance training;Gait training;Bed mobility   Progress Slow progress, medical status limitations   Recommendation   PT Discharge Recommendation Post acute rehabilitation services   Equipment Recommended Carlos Gregg; Wheelchair   PT Therapy Minutes   PT Time In 1400   PT Time Out 1530   PT Total Time (minutes) 90   PT Mode of treatment - Individual (minutes) 90   PT Mode of treatment - Concurrent (minutes) 0   PT Mode of treatment - Group (minutes) 0   PT Mode of treatment - Co-treat (minutes) 0   PT Mode of Treatment - Total time(minutes) 90 minutes   PT Cumulative Minutes 1735   Therapy Time missed   Time missed?  No

## 2022-09-09 NOTE — ASSESSMENT & PLAN NOTE
- Improving off baclofen - uncertain if this played a role but may have   - Earlier in course -intermittent transient blinking quickly, head shaking, increased L sided shaking, 1 word nonsensical word substitution, atypical occasional spitting sputum   - Spoke with neuro 9/9 - above felt to be largely psychogenic in etiology   - routine EEG 9/8 - unremarkable   - CT head 9/7 - without acute concerns; Mildly improved gray-white matter differentiation right MCA territory compared to August 15  No hemorrhagic conversion, laminar necrosis, or mass lesion   - Neuro follow-up 9/8   "Neurology did see patient in terms some abnormal behaviors they had a low suspicion for seizure they did order an EEG we will wait the results of this  Head CT was negative for any new findings  Will continue to monitor for abnormal behavior although may be focused on psychogenic Foundation  "   - Neuro exam stable, vitals stable; CBC/CMP/TSH wnl  - IM team does not recommend additional med work-up   - May be behavioral/psychogenic   - Optimal mood mgmt (Adjustment d/o with mixed emotional symptoms), sleep mgmt, activity/skilled PT/OT/ST   - D/C'd baclofen recently   - OP follow-up with PCP/neuro

## 2022-09-09 NOTE — PROGRESS NOTES
[facility] Occupational Therapy Daily Note  Patient Active Problem List   Diagnosis    Other chest pain    Essential hypertension    Hyperlipidemia LDL goal <100    Stage 3 chronic kidney disease (HCC)    Osteoarthritis of both wrists    Chronic atrial fibrillation (HCC)    Tubulovillous adenoma    Gastroesophageal reflux disease without esophagitis    Pacemaker    B12 deficiency    Allergic rhinitis due to pollen    Cavus deformity of foot    Midline cystocele    Hallux abducto valgus, bilateral    Left renal artery stenosis (HCC)    Osteoarthritis of hip    Osteopenia    Pain due to onychomycosis of toenails of both feet    Left atrial enlargement    Lipoma of right upper extremity    Diverticulosis of colon    Abnormal nuclear stress test    Renal insufficiency    Arterial ischemic stroke, MCA (middle cerebral artery), right, acute ([address] )    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 ([address] )    Anemia    At risk for venous thromboembolism (VTE)    DARRIUS (acute kidney injury) ([address] )    Spastic hemiparesis of left dominant side as late effect of cerebral infarction ([address] )    Valvular heart disease    CAD (coronary artery disease)    Urinary retention    Neck pain    Healthcare maintenance    Adjustment disorder with mixed emotional features    Cough    Thrush    At risk for delirium    Transient neurological symptoms       Past Medical History:   Diagnosis Date    A-fib (HCC)     Anemia     Arthritis     Breast pain, right     Chest pain on breathing     Colon polyp     Cough     Diverticulosis     Encounter for screening colonoscopy     H/O sick sinus syndrome     Heart murmur     High cholesterol     History of atrial fibrillation     Rate ontrolled  On xarelto 15mg (creatinine 50) Followed by Dr Qasim Richards with Cardiology     History of weight loss     Report loose fitting clothes  Weight stable (3lbs difference)  Last colo showed small tubular adenoma x2  Breast biopsy last showed fibrocystic changes  TSH wnl  Will check cbc, bmp, spep  Hx of long term use of blood thinners     Hypertension     Irregular heart beat     Pacemaker     Preop examination     Shortness of breath     Viral bronchitis      Etiologic Diagnosis: right mca ischemic cva  Restrictions/Precautions  Precautions: Aspiration, Bed/chair alarms, Cognitive, Fall Risk, Supervision on toilet/commode  Weight Bearing Restrictions: No  ROM Restrictions: No  Braces or Orthoses: AFO  ADL Team Goal: Patient will be independent with ADLs with least restrictive device upon completion of rehab program  Occupational Therapy LTG's  Eating Oral care Bathing LB dress UB dress   Eating Goal: 06  Independent - Patient completes the activity by him/herself with no assistance from a helper  Oral Hygiene Goal: 06  Independent - Patient completes the activity by him/herself with no assistance from a helper  Shower/bathe self Goal: 06  Independent - Patient completes the activity by him/herself with no assistance from a helper  Lower body dressing Goal: 06  Independent - Patient completes the activity by him/herself with no assistance from a helper  Upper body dressing Goal: 06  Independent - Patient completes the activity by him/herself with no assistance from a helper  Toileting Toilet txf Func txf IADL Med    Toileting hygiene Goal: 06  Independent - Patient completes the activity by him/herself with no assistance from a helper  Toilet transfer Goal: 06  Independent - Patient completes the activity by him/herself with no assistance from a helper           OT interventions: Treatment/Interventions: (P) ADL retraining, Functional transfer training, LE strengthening/ROM, Therapeutic exercise, Endurance training, Cognitive reorientation, Patient/family training, Equipment eval/education, Bed mobility, Compensatory technique education  Discharge Plan:  OT Discharge Recommendation:  (24/7 assist) TBD  DME: TBD     09/09/22 0700   Pain Assessment   Pain Assessment Tool 0-10 Pain Score No Pain   Lifestyle   Autonomy "see I dont like mushrooms, why they do this to me?"   Eating   Type of Assistance Needed Set-up / clean-up   Eating CARE Score 5   Oral Hygiene   Type of Assistance Needed Physical assistance   Physical Assistance Level 26%-50%   Comment oral care in stance at sink  focus on heavy weight bearing through L LE with faciliation of L weight shift  Oral Hygiene CARE Score 3   Shower/Bathe Self   Type of Assistance Needed Physical assistance   Physical Assistance Level 26%-50%   Comment MOD A in stance with assist for L LE foot on the floor  improved abilty to use L UE for bathing tasks including washing hair  Dec abilty to reach buttocks in stance  Shower/Bathe Self CARE Score 3   Tub/Shower Transfer   Findings MOD A SPT  Dec abilty to complete L LE advancment for stepping, had to complete stand and pivot   Upper Body Dressing   Type of Assistance Needed Set-up / clean-up   Upper Body Dressing CARE Score 5   Lower Body Dressing   Type of Assistance Needed Physical assistance   Physical Assistance Level 51%-75%   Comment LB dressing with focus on forward anterior weight shift  unable to unweight L LE for threading  Req heavy assist for completing L LE cross leg for threading  in stance with MOD A pt able to bring underwear up over hips, req assist for pants  Lower Body Dressing CARE Score 2   Putting On/Taking Off Footwear   Type of Assistance Needed Physical assistance   Physical Assistance Level 51%-75%   Comment Cross leg tech for placement of shoe over toes and ant weight shift for heel placement  assit for L LE cross leg to maintain position  Putting On/Taking Off Footwear CARE Score 2   Lying to Sitting on Side of Bed   Type of Assistance Needed Physical assistance   Physical Assistance Level 26%-50%   Comment Pt utilizing b/l UE to help advance L LE to L OOB   Pt demo improved L UE pushing to control trunk, and advancement of hips   Lying to Sitting on Side of Bed CARE Score 3   Sit to Stand   Type of Assistance Needed Physical assistance   Physical Assistance Level 26%-50%   Comment activity focus on b/l UE reach back to chair and push off  tactile support for facilitaion of L LE foot placement  pt w/ poor awareness of L LE placement, going into extension kicking off the floor  Sit to Stand CARE Score 3   Bed-Chair Transfer   Type of Assistance Needed Physical assistance   Physical Assistance Level 26%-50%   Comment modified stand pivot to R  unable to advance L LE for stepping   Chair/Bed-to-Chair Transfer CARE Score 3   Neuromuscular Education   Response to Weight Bearing Technique completed standing LE weight bearing with off set standing surface  swaying weight shifting with facilitation for L side weight bearing  stance reaching activites with L+R UE for cross body reaching to facilitate cross body coordination  reduced ankle clonus after weight bearing  Assessment   Treatment Assessment Pt participated in skilled OT session focusing on functional ADL retraining with aspects of NMR and L side use, activity tolerance, activity modification  See above for details on ADL function  Pt continues to be limited mostly by L LE spasticity/tone  Pt is unable to bypass tone to complete functional activities and complete safe transfers  Pt's left UE function currently Functional assist  Prehension patterns noted (lateral Pinch)  Pt is able to complete 9 hole peg test if given ample time  increased isolation of digits noted  Continue to focus neurological treatment plan:Repetitive Task training, Trunk restrained Reaching, L UE AND LE Weight bearing, Mirror Therapy, Body awareness, Gross motor coordination, motor planning, Spatial awareness, Functional Attention , Constraint Induced Movement Therapy (modified) and KinesioTaping   Prognosis Fair   Plan   Treatment/Interventions ADL retraining;Functional transfer training;LE strengthening/ROM; Therapeutic exercise; Endurance training;Cognitive reorientation;Patient/family training;Equipment eval/education; Bed mobility; Compensatory technique education   OT Therapy Minutes   OT Time In 0700   OT Time Out 0830   OT Total Time (minutes) 90   OT Mode of treatment - Individual (minutes) 90   OT Mode of treatment - Concurrent (minutes) 0   OT Mode of treatment - Group (minutes) 0   OT Mode of treatment - Co-treat (minutes) 0   OT Mode of Treatment - Total time(minutes) 90 minutes   OT Cumulative Minutes 1630

## 2022-09-09 NOTE — CASE MANAGEMENT
Pt is cleared for dc  mssg sent to [facility] via [facility] inquiring about bed availability  Clinical info faxed to Isabel Lima at [facility] requesting auth  Cm waiting for confirmation of bed before requesting transport

## 2022-09-09 NOTE — PLAN OF CARE
Reviewed       Problem: MOBILITY - ADULT  Goal: Maintain or return to baseline ADL function  Description: INTERVENTIONS:  -  Assess patient's ability to carry out ADLs; assess patient's baseline for ADL function and identify physical deficits which impact ability to perform ADLs (bathing, care of mouth/teeth, toileting, grooming, dressing, etc )  - Assess/evaluate cause of self-care deficits   - Assess range of motion  - Assess patient's mobility; develop plan if impaired  - Assess patient's need for assistive devices and provide as appropriate  - Encourage maximum independence but intervene and supervise when necessary  - Involve family in performance of ADLs  - Assess for home care needs following discharge   - Consider OT consult to assist with ADL evaluation and planning for discharge  - Provide patient education as appropriate  Outcome: Progressing  Goal: Maintains/Returns to pre admission functional level  Description: INTERVENTIONS:  - Set and communicate daily mobility goal to care team and patient/family/caregiver  - Collaborate with rehabilitation services on mobility goals if consulted  - Perform Range of Motion 3 times a day  - Reposition patient every 2 hours    - Dangle patient 3 times a day  - Stand patient 3 times a day  - Ambulate patient 3 times a day  - Out of bed to chair 3 times a day   - Out of bed for meals 3 times a day  - Out of bed for toileting  - Record patient progress and toleration of activity level   Outcome: Progressing     Problem: Prexisting or High Potential for Compromised Skin Integrity  Goal: Skin integrity is maintained or improved  Description: INTERVENTIONS:  - Identify patients at risk for skin breakdown  - Assess and monitor skin integrity  - Assess and monitor nutrition and hydration status  - Monitor labs   - Assess for incontinence   - Turn and reposition patient  - Assist with mobility/ambulation  - Relieve pressure over bony prominences  - Avoid friction and shearing  - Provide appropriate hygiene as needed including keeping skin clean and dry  - Evaluate need for skin moisturizer/barrier cream  - Collaborate with interdisciplinary team   - Patient/family teaching  - Consider wound care consult   Outcome: Progressing     Problem: Potential for Falls  Goal: Patient will remain free of falls  Description: INTERVENTIONS:  - Educate patient/family on patient safety including physical limitations  - Instruct patient to call for assistance with activity   - Consult OT/PT to assist with strengthening/mobility   - Keep Call bell within reach  - Keep bed low and locked with side rails adjusted as appropriate  - Keep care items and personal belongings within reach  - Initiate and maintain comfort rounds  - Make Fall Risk Sign visible to staff  - Offer Toileting every 2-4 Hours, in advance of need  - Initiate/Maintain bed/chair alarm  - Obtain necessary fall risk management equipment: nonskid footwear  - Apply yellow socks and bracelet for high fall risk patients  - Consider moving patient to room near nurses station  Outcome: Progressing     Problem: PAIN - ADULT  Goal: Verbalizes/displays adequate comfort level or baseline comfort level  Description: Interventions:  - Encourage patient to monitor pain and request assistance  - Assess pain using appropriate pain scale  - Administer analgesics based on type and severity of pain and evaluate response  - Implement non-pharmacological measures as appropriate and evaluate response  - Consider cultural and social influences on pain and pain management  - Notify physician/advanced practitioner if interventions unsuccessful or patient reports new pain  Outcome: Progressing     Problem: INFECTION - ADULT  Goal: Absence or prevention of progression during hospitalization  Description: INTERVENTIONS:  - Assess and monitor for signs and symptoms of infection  - Monitor lab/diagnostic results  - Monitor all insertion sites, i e  indwelling lines, tubes, and drains  - Monitor endotracheal if appropriate and nasal secretions for changes in amount and color  - [place] appropriate cooling/warming therapies per order  - Administer medications as ordered  - Instruct and encourage patient and family to use good hand hygiene technique  - Identify and instruct in appropriate isolation precautions for identified infection/condition  Outcome: Progressing     Problem: DISCHARGE PLANNING  Goal: Discharge to home or other facility with appropriate resources  Description: INTERVENTIONS:  - Identify barriers to discharge w/patient and caregiver  - Arrange for needed discharge resources and transportation as appropriate  - Identify discharge learning needs (meds, wound care, etc )  - Arrange for interpretive services to assist at discharge as needed  - Refer to Case Management Department for coordinating discharge planning if the patient needs post-hospital services based on physician/advanced practitioner order or complex needs related to functional status, cognitive ability, or social support system  Outcome: Progressing     Problem: Nutrition/Hydration-ADULT  Goal: Nutrient/Hydration intake appropriate for improving, restoring or maintaining nutritional needs  Description: Monitor and assess patient's nutrition/hydration status for malnutrition  Collaborate with interdisciplinary team and initiate plan and interventions as ordered  Monitor patient's weight and dietary intake as ordered or per policy  Utilize nutrition screening tool and intervene as necessary  Determine patient's food preferences and provide high-protein, high-caloric foods as appropriate       INTERVENTIONS:  - Monitor oral intake, urinary output, labs, and treatment plans  - Assess nutrition and hydration status and recommend course of action  - Evaluate amount of meals eaten  - Assist patient with eating if necessary   - Allow adequate time for meals  - Recommend/ encourage appropriate diets, oral nutritional supplements, and vitamin/mineral supplements  - Order, calculate, and assess calorie counts as needed  - Recommend, monitor, and adjust tube feedings and TPN/PPN based on assessed needs  - Assess need for intravenous fluids  - Provide specific nutrition/hydration education as appropriate  - Include patient/family/caregiver in decisions related to nutrition  Outcome: Progressing

## 2022-09-09 NOTE — PLAN OF CARE
Problem: MOBILITY - ADULT  Goal: Maintain or return to baseline ADL function  Description: INTERVENTIONS:  -  Assess patient's ability to carry out ADLs; assess patient's baseline for ADL function and identify physical deficits which impact ability to perform ADLs (bathing, care of mouth/teeth, toileting, grooming, dressing, etc )  - Assess/evaluate cause of self-care deficits   - Assess range of motion  - Assess patient's mobility; develop plan if impaired  - Assess patient's need for assistive devices and provide as appropriate  - Encourage maximum independence but intervene and supervise when necessary  - Involve family in performance of ADLs  - Assess for home care needs following discharge   - Consider OT consult to assist with ADL evaluation and planning for discharge  - Provide patient education as appropriate  Outcome: Progressing  Goal: Maintains/Returns to pre admission functional level  Description: INTERVENTIONS:  - Set and communicate daily mobility goal to care team and patient/family/caregiver  - Collaborate with rehabilitation services on mobility goals if consulted  - Perform Range of Motion 3 times a day  - Reposition patient every 2 hours    - Dangle patient 3 times a day  - Stand patient 3 times a day  - Ambulate patient 3 times a day  - Out of bed to chair 3 times a day   - Out of bed for meals 3 times a day  - Out of bed for toileting  - Record patient progress and toleration of activity level   Outcome: Progressing     Problem: Prexisting or High Potential for Compromised Skin Integrity  Goal: Skin integrity is maintained or improved  Description: INTERVENTIONS:  - Identify patients at risk for skin breakdown  - Assess and monitor skin integrity  - Assess and monitor nutrition and hydration status  - Monitor labs   - Assess for incontinence   - Turn and reposition patient  - Assist with mobility/ambulation  - Relieve pressure over bony prominences  - Avoid friction and shearing  - Provide appropriate hygiene as needed including keeping skin clean and dry  - Evaluate need for skin moisturizer/barrier cream  - Collaborate with interdisciplinary team   - Patient/family teaching  - Consider wound care consult   Outcome: Progressing     Problem: Potential for Falls  Goal: Patient will remain free of falls  Description: INTERVENTIONS:  - Educate patient/family on patient safety including physical limitations  - Instruct patient to call for assistance with activity   - Consult OT/PT to assist with strengthening/mobility   - Keep Call bell within reach  - Keep bed low and locked with side rails adjusted as appropriate  - Keep care items and personal belongings within reach  - Initiate and maintain comfort rounds  - Make Fall Risk Sign visible to staff  - Offer Toileting every 2-4 Hours, in advance of need  - Initiate/Maintain bed/chair alarm  - Obtain necessary fall risk management equipment: nonskid footwear  - Apply yellow socks and bracelet for high fall risk patients  - Consider moving patient to room near nurses station  Outcome: Progressing     Problem: PAIN - ADULT  Goal: Verbalizes/displays adequate comfort level or baseline comfort level  Description: Interventions:  - Encourage patient to monitor pain and request assistance  - Assess pain using appropriate pain scale  - Administer analgesics based on type and severity of pain and evaluate response  - Implement non-pharmacological measures as appropriate and evaluate response  - Consider cultural and social influences on pain and pain management  - Notify physician/advanced practitioner if interventions unsuccessful or patient reports new pain  Outcome: Progressing     Problem: INFECTION - ADULT  Goal: Absence or prevention of progression during hospitalization  Description: INTERVENTIONS:  - Assess and monitor for signs and symptoms of infection  - Monitor lab/diagnostic results  - Monitor all insertion sites, i e  indwelling lines, tubes, and drains  - Monitor endotracheal if appropriate and nasal secretions for changes in amount and color  - [place] appropriate cooling/warming therapies per order  - Administer medications as ordered  - Instruct and encourage patient and family to use good hand hygiene technique  - Identify and instruct in appropriate isolation precautions for identified infection/condition  Outcome: Progressing     Problem: DISCHARGE PLANNING  Goal: Discharge to home or other facility with appropriate resources  Description: INTERVENTIONS:  - Identify barriers to discharge w/patient and caregiver  - Arrange for needed discharge resources and transportation as appropriate  - Identify discharge learning needs (meds, wound care, etc )  - Arrange for interpretive services to assist at discharge as needed  - Refer to Case Management Department for coordinating discharge planning if the patient needs post-hospital services based on physician/advanced practitioner order or complex needs related to functional status, cognitive ability, or social support system  Outcome: Progressing     Problem: Nutrition/Hydration-ADULT  Goal: Nutrient/Hydration intake appropriate for improving, restoring or maintaining nutritional needs  Description: Monitor and assess patient's nutrition/hydration status for malnutrition  Collaborate with interdisciplinary team and initiate plan and interventions as ordered  Monitor patient's weight and dietary intake as ordered or per policy  Utilize nutrition screening tool and intervene as necessary  Determine patient's food preferences and provide high-protein, high-caloric foods as appropriate       INTERVENTIONS:  - Monitor oral intake, urinary output, labs, and treatment plans  - Assess nutrition and hydration status and recommend course of action  - Evaluate amount of meals eaten  - Assist patient with eating if necessary   - Allow adequate time for meals  - Recommend/ encourage appropriate diets, oral nutritional supplements, and vitamin/mineral supplements  - Order, calculate, and assess calorie counts as needed  - Recommend, monitor, and adjust tube feedings and TPN/PPN based on assessed needs  - Assess need for intravenous fluids  - Provide specific nutrition/hydration education as appropriate  - Include patient/family/caregiver in decisions related to nutrition  Outcome: Progresssing

## 2022-09-09 NOTE — PROGRESS NOTES
Internal Medicine Progress Note  Patient: Rodrigo Ch  Age/sex: 80 y o  female  Medical Record #: [id number]      ASSESSMENT/PLAN: (Interval History)  Rodrigo Ch is seen and examined and management for following issues:    Acute embolic right MCA CVA  · Had near occlusion of right MCA  · Felt to be a Xarelto failure and was switched to Pradaxa  · ECHO w/o thrombus  · Continue statin  · On Baclofen for tone = much improved   · On 9/2, 9/5 and 9/6 states she is saying a specific word that means nothing  She will repeat a particular word that is not really a word and she knows it  Dina Lawson speaks fluently about it at the same time  MARIA L MORALES  [facility] neuro exam has been unchanged   Dr Clifford Devoid d/w Neuro on 9/5 and they felt sx don't seem to be neurological = med related?   EEG normal  Head CT negative for new findings  Neurology suspects the episodes are not neurologic in nature and does not recommend any further neurologic work-up      Chronic atrial fibrillation  · Sees Dr Hammad Forrester as OP  · Rates controlled  · Cont Pradaxa/Bystolic     PPM  · VVI  · Is not MRI conditional  · 100% paced on EKG     Valvular disease  · Current ECHO:  LVEF 65%, mod AR/mild AS, mod TR with severely elevated RV systolic pressure, mild-mod MR, mod LAE/mild NEO  · Euvolemic currently     HTN  · Home:  Verapamil 240mg qhs/Cozaar 901 mg qd/Bystolic 5mg qd/lasix 39BO qd/Aldactone 25mg qd  · Here:  Verapamil 240mg qhs/Cozaar 12IM qd/Bystolic 5mg qd  · OP note states has left RA stenosis but no testing in OP records or Care Everywhere to verify  · BP was getting a bit soft at times so starting 9/6, reduced Cozaar to 50mg qd and has improved  · No changes today     HLD  · Home:  Crestor 10mg qd = Neuro rec when discharged to reduce to 5mg qd  · Here:  Pravachol 40mg qd     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  · S/p NS 1 liter   · Back to baseline = as OP GFR 50's to 60's    GFR here 40's to 60's  · Continue to hold Lasix and Aldactone for now     CAD  · Nonobstructive  · Card cath 8/2021 done for borderline abn stress test and found 50% mid RCA/40% mid LAD = no intervention done  · Continue statin  · Was not on ASA as an OP     Hypomagnesemia  · Cont 400 mg MagOx  · stable     Left chest pain  · Had left neck pain (not new for her) then pain under left breast 8/31/22 = reproducible with palpation  · Cardiac w/u negative  · Etio was her left arm spasticity   · 2/2 developing odd behaviors over her time here in [facility] so Baclofen reduced 9/5 to 5mg TID      Cough   · CXR negative 8/25  · Has scheduled Tessalon perles 200mg TID/Chlorarseptic spray/pepcid/Mucinex  · Started z-pack 9/2 in case has tracheobronchitis which she had a x3 days and stopped when Keflex started by PMR for asymp bacteruria   · Flonase was not effective  · Cozaar likely not the cause since has been on for a long time  · Says feels like something in throat and constantly spits out her saliva = per ST has no previous issues eating/drinking but since no improvement --> D/W Dr Ana Hickey and ST --> s/p VBS 9/6 w/o acute findings  · May have some thrush as below but overall likely behavioral     Possible thrush  · On tongue patel have some mild thrush  · If so, then could have some esophagitis from it as well  · Continue empiric Mycostatin      Urine retention  · Improved  · Management per PMR  · Flomax added on 8/31/22  · Urine culture is >60,000-69,000 proteus = no sx but PMR tx'ing with Keflex empirically     Vaginal yeast infection  · S/p Monistat pack/Diflucan 150mg x 1         DC planning: Potential DC 9/9/22        The above assessment and plan was reviewed and updated as determined by my evaluation of the patient on 9/9/2022      Labs:   Results from last 7 days   Lab Units 09/07/22  1507 09/05/22  0428   WBC Thousand/uL 7 16 5 88   HEMOGLOBIN g/dL 11 5 10 7*   HEMATOCRIT % 35 0 34 5*   PLATELETS Thousands/uL 237 227     Results from last 7 days   Lab Units 09/07/22  1507 09/05/22  0428 SODIUM mmol/L 136 137   POTASSIUM mmol/L 4 1 4 0   CHLORIDE mmol/L 106 107   CO2 mmol/L 23 24   BUN mg/dL 17 29*   CREATININE mg/dL 0 92 1 12   CALCIUM mg/dL 9 4 9 2             Results from last 7 days   Lab Units 22  0600   POC GLUCOSE mg/dl 91       Review of Scheduled Meds:  Current Facility-Administered Medications   Medication Dose Route Frequency Provider Last Rate    acetaminophen  650 mg Oral Q6H PRN Daisy Villegas MD      benzonatate  200 mg Oral TID TOMY Jacobo      bisacodyl  10 mg Rectal Daily PRN Daisy Villegas MD      cephalexin  500 mg Oral Q6H [address] Daisy Villegas MD      dabigatran etexilate  150 mg Oral Q12H [address] Daisy Villegas MD      guaiFENesin  600 mg Oral Q12H [address] Amna Andreas Bisbee, CRNP      losartan  50 mg Oral Daily Amna Andreas Bisbee, CRNP      magnesium oxide  400 mg Oral Daily Daisy Villegas MD      melatonin  6 mg Oral QPM Daniel Foster DO      menthol-methyl salicylate   Apply externally BID Brenda Carcmao MD      nebivolol  5 mg Oral Daily Daisy Villegas MD      nitroglycerin  0 4 mg Sublingual Q5 Min PRN TOMY Jacobo      nystatin  500,000 Units Swish & Swallow 4x Daily Amna Andreas Bisbee, CRNP      pantoprazole  40 mg Oral Early Morning Daisy Villegas MD      phenol  1 spray Mouth/Throat Q2H PRN Brenda Carcamo MD      polyethylene glycol  17 g Oral Daily PRN Daisy Villegas MD      pravastatin  40 mg Oral Daily With Adithya Bell MD      tamsulosin  0 4 mg Oral Daily With Denton Garibay MD      verapamil  240 mg Oral HS Daisy Villegas MD         Subjective/ HPI: Patient seen and examined  Patients overnight issues or events were reviewed with nursing or staff during rounds or morning huddle session  New or overnight issues include the following:     Pt seen in her room  No nursing complaints overnight  ROS:   A 10 point ROS was performed; negative except as noted above         Imaging:     CT head wo contrast   Final Result by Marixa Rincon DO (09/07 2016)   Mildly improved gray-white matter differentiation right MCA territory compared to August 15  No hemorrhagic conversion, laminar necrosis, or mass lesion  Workstation performed: [id number]         FL barium swallow video w speech   Final Result by ARTEMIO GONZALEZ (09/06 [id number])      XR chest portable   Final Result by Shane Alvarado MD (94/76 [id number])      No acute cardiopulmonary disease  Workstation performed: [id number]             *Labs /Radiology studies reviewed  *Medications reviewed and reconciled as needed  *Please refer to order section for additional ordered labs studies  *Case discussed with primary attending during morning huddle case rounds    Physical Examination:  Vitals:   Vitals:    09/08/22 0831 09/08/22 1355 09/08/22 2138 09/09/22 0500   BP: 133/52 (!) 115/49 128/83 122/61   BP Location: Right arm Right arm Left arm Right arm   Pulse: 60 59 67 60   Resp:  18 16 16   Temp:  97 6 °F (36 4 °C) 98 1 °F (36 7 °C) 97 7 °F (36 5 °C)   TempSrc:  Oral Oral Oral   SpO2:  97% 97% 98%   Weight:       Height:         General Appearance: no distress, conversive  HEENT: PERRLA, conjuctiva normal; oropharynx clear; mucous membranes moist   Neck:  Supple, normal ROM  Lungs: CTA, normal respiratory effort, no retractions, expiratory effort normal  CV: regular rate and rhythm; no rubs/murmurs/gallops, PMI normal   ABD: soft; ND/NT; +BS  EXT: no edema  Skin: normal turgor, normal texture, no rashes  Psych: affect normal, mood normal  Neuro: AA      The above physical exam was reviewed and updated as determined by my evaluation of the patient on 9/9/2022  Invasive Devices  Report    None                    VTE Pharmacologic Prophylaxis: Pradaxa  Code Status: Level 1 - Full Code  Current Length of Stay: 21 day(s)      Total time spent:  30 minutes with more than 50% spent counseling/coordinating care  Counseling includes discussion with patient re: progress  and discussion with patient of his/her current medical state/information  Coordination of patient's care was performed in conjunction with primary service  Time invested included review of patient's labs, vitals, and management of their comorbidities with continued monitoring  In addition, this patient was discussed with medical team including physician and advanced extenders  The care of the patient was extensively discussed and appropriate treatment plan was formulated unique for this patient  ** Please Note:  voice to text software may have been used in the creation of this document   Although proof errors in transcription or interpretation are a potential of such software**

## 2022-09-10 PROCEDURE — 99232 SBSQ HOSP IP/OBS MODERATE 35: CPT | Performed by: INTERNAL MEDICINE

## 2022-09-10 PROCEDURE — 97530 THERAPEUTIC ACTIVITIES: CPT

## 2022-09-10 PROCEDURE — 99232 SBSQ HOSP IP/OBS MODERATE 35: CPT | Performed by: PHYSICAL MEDICINE & REHABILITATION

## 2022-09-10 PROCEDURE — 97535 SELF CARE MNGMENT TRAINING: CPT

## 2022-09-10 RX ADMIN — Medication 6 MG: at 21:22

## 2022-09-10 RX ADMIN — CEPHALEXIN 500 MG: 500 CAPSULE ORAL at 12:02

## 2022-09-10 RX ADMIN — NYSTATIN 500000 UNITS: 100000 SUSPENSION ORAL at 16:13

## 2022-09-10 RX ADMIN — MENTHOL, METHYL SALICYLATE: 10; 15 CREAM TOPICAL at 21:25

## 2022-09-10 RX ADMIN — CEPHALEXIN 500 MG: 500 CAPSULE ORAL at 23:48

## 2022-09-10 RX ADMIN — NYSTATIN 500000 UNITS: 100000 SUSPENSION ORAL at 12:02

## 2022-09-10 RX ADMIN — VERAPAMIL HYDROCHLORIDE 240 MG: 240 TABLET ORAL at 21:23

## 2022-09-10 RX ADMIN — PRAVASTATIN SODIUM 40 MG: 40 TABLET ORAL at 16:13

## 2022-09-10 RX ADMIN — MENTHOL, METHYL SALICYLATE 1 APPLICATION: 10; 15 CREAM TOPICAL at 08:36

## 2022-09-10 RX ADMIN — PANTOPRAZOLE SODIUM 40 MG: 40 TABLET, DELAYED RELEASE ORAL at 05:42

## 2022-09-10 RX ADMIN — BENZONATATE 200 MG: 100 CAPSULE ORAL at 08:35

## 2022-09-10 RX ADMIN — CEPHALEXIN 500 MG: 500 CAPSULE ORAL at 00:22

## 2022-09-10 RX ADMIN — BENZONATATE 200 MG: 100 CAPSULE ORAL at 16:13

## 2022-09-10 RX ADMIN — DABIGATRAN ETEXILATE MESYLATE 150 MG: 150 CAPSULE ORAL at 21:22

## 2022-09-10 RX ADMIN — MAGNESIUM OXIDE TAB 400 MG (241.3 MG ELEMENTAL MG) 400 MG: 400 (241.3 MG) TAB at 08:35

## 2022-09-10 RX ADMIN — CEPHALEXIN 500 MG: 500 CAPSULE ORAL at 17:30

## 2022-09-10 RX ADMIN — NEBIVOLOL 5 MG: 5 TABLET ORAL at 08:36

## 2022-09-10 RX ADMIN — GUAIFENESIN 600 MG: 600 TABLET, EXTENDED RELEASE ORAL at 21:22

## 2022-09-10 RX ADMIN — LOSARTAN POTASSIUM 50 MG: 50 TABLET, FILM COATED ORAL at 08:36

## 2022-09-10 RX ADMIN — NYSTATIN 500000 UNITS: 100000 SUSPENSION ORAL at 21:22

## 2022-09-10 RX ADMIN — GUAIFENESIN 600 MG: 600 TABLET, EXTENDED RELEASE ORAL at 08:35

## 2022-09-10 RX ADMIN — NYSTATIN 500000 UNITS: 100000 SUSPENSION ORAL at 08:35

## 2022-09-10 RX ADMIN — TAMSULOSIN HYDROCHLORIDE 0.4 MG: 0.4 CAPSULE ORAL at 16:13

## 2022-09-10 RX ADMIN — CEPHALEXIN 500 MG: 500 CAPSULE ORAL at 05:42

## 2022-09-10 RX ADMIN — DABIGATRAN ETEXILATE MESYLATE 150 MG: 150 CAPSULE ORAL at 08:35

## 2022-09-10 RX ADMIN — BENZONATATE 200 MG: 100 CAPSULE ORAL at 21:22

## 2022-09-10 NOTE — PROGRESS NOTES
09/10/22 0830   Pain Assessment   Pain Assessment Tool 0-10   Pain Score No Pain   Restrictions/Precautions   Precautions Aspiration;Bed/chair alarms;Cognitive; Fall Risk;Supervision on toilet/commode   Braces or Orthoses AFO   Lifestyle   Autonomy "I feel like my bladder is full"  Oral Hygiene   Type of Assistance Needed Supervision   Physical Assistance Level No physical assistance   Comment completed seated sinkside, pt able to manage tooth brush and toothpaste tube  Oral Hygiene CARE Score 4   Grooming   Able To Initiate Tasks;Comb/Brush Hair;Wash/Dry Face;Brush/Clean Teeth;Wash/Dry Hands   Limitation Noted In Coordination;Strength   Shower/Bathe Self   Type of Assistance Needed Physical assistance   Physical Assistance Level 26%-50%   Comment seated on transfer bench, pt required assist to bathe B lower legs and buttocks in stance  Pt able to apply soap to wash cloth and manage wash cloth during bathing task  Shower/Bathe Self CARE Score 3   Bathing   Assessed Bath Style Shower   Able to Patricio Christiano No   Able to [employer] Temperature No   Able to Wash/Rinse/Dry (body part) Left Arm;Right Arm;L Upper Leg;R Upper Leg;Chest;Abdomen;Perineal Area   Limitations Noted in Balance; Coordination; Endurance; Safety;Timeliness   Positioning Seated   Adaptive Equipment Tub Bench;Hand Held Shower   Tub/Shower Transfer   Limitations Noted In Balance; Coordination; Safety;ROM;LE Strength   Adaptive Equipment Grab Bars;Transfer Bench   Findings Pt required Mod A fx mobility/transfer toilet to shower using r/w  Upper Body Dressing   Type of Assistance Needed Set-up / clean-up   Physical Assistance Level No physical assistance   Comment seated   Upper Body Dressing CARE Score 5   Lower Body Dressing   Type of Assistance Needed Physical assistance   Physical Assistance Level 51%-75%   Comment Pt required assist to thread LLE into pants and assist to manage fabric during threading by pt of RLE    Pt required mod A for balance in stance, however, pt able to hike pants over hips indep  Lower Body Dressing CARE Score 2   Putting On/Taking Off Footwear   Type of Assistance Needed Physical assistance   Physical Assistance Level 25% or less   Comment Pt able to doff socks using dressing stick and scooby B socks using sock aid, pt able to scooby R shoes, assist to scooby L shoe   Putting On/Taking Off Footwear CARE Score 3   Sit to Lying   Type of Assistance Needed Physical assistance   Physical Assistance Level 26%-50%   Comment Assist to bring BLE into bed and for positioning  Sit to Lying CARE Score 3   Lying to Sitting on Side of Bed   Type of Assistance Needed Physical assistance   Physical Assistance Level 25% or less   Comment Pt utlized bed rail to bring self EOB  Lying to Sitting on Side of Bed CARE Score 3   Sit to Stand   Type of Assistance Needed Physical assistance   Physical Assistance Level 26%-50%   Comment pt required assist asending from bed w/retropulsion in stance  Sit to Stand CARE Score 3   Bed-Chair Transfer   Type of Assistance Needed Physical assistance   Physical Assistance Level 26%-50%   Comment SPT, pt demo-safe hand placement   Chair/Bed-to-Chair Transfer CARE Score 3   Toileting Hygiene   Type of Assistance Needed Physical assistance   Physical Assistance Level 76% or more   Comment asssist for hygiene during bowel movement  Mod A for balance in stance to hike pants over hips  Toileting Hygiene CARE Score 2   Toilet Transfer   Type of Assistance Needed Physical assistance   Physical Assistance Level 26%-50%   Comment SPT using grab bars  Toilet Transfer CARE Score 3   Coordination   Fine Motor Pt engaged in shape match focusing on   Dexterity Pt engaged in shape match focusing on FMC/FMS and prehension patterns improving fm abilities for accuracy and precision during grooming and self feeding tasks    Pt required to retrieve shapes abnd place to target tiles followed by retrieving shapes using tongs and placing to drawstring fabric bag  Pt noted with min difficulty to manipulate the shapes for placement, no noted droppage  Assessment   Treatment Assessment Pt engaged in skilled 90 min OT session focusing on ADL re-training, safety awareness, fxl mobility/transfers and overall activity endurance improving indep in ADL/IADL's to return to PLOF and safe d/c to home  See above note for details  Education provided on AE improving indep in LB self care tasks  Pt demo-ability to successfully thread sock onto sock aid to scooby B socks  Pt demo-safe hand placement during transfers using r/w, however pt required cues to shift weight into toes to decrease retropulsion in stance  Recommend continued skilled OT focusing on POC in preparation for safe d/c    OT Therapy Minutes   OT Time In 0830   OT Time Out 1015   OT Total Time (minutes) 105   OT Mode of treatment - Individual (minutes) 105   OT Mode of treatment - Concurrent (minutes) 0   OT Mode of treatment - Group (minutes) 0   OT Mode of treatment - Co-treat (minutes) 0   OT Mode of Treatment - Total time(minutes) 105 minutes   OT Cumulative Minutes 1735   Therapy Time missed   Time missed?  No

## 2022-09-10 NOTE — PLAN OF CARE
Problem: MOBILITY - ADULT  Goal: Maintain or return to baseline ADL function  Description: INTERVENTIONS:  -  Assess patient's ability to carry out ADLs; assess patient's baseline for ADL function and identify physical deficits which impact ability to perform ADLs (bathing, care of mouth/teeth, toileting, grooming, dressing, etc )  - Assess/evaluate cause of self-care deficits   - Assess range of motion  - Assess patient's mobility; develop plan if impaired  - Assess patient's need for assistive devices and provide as appropriate  - Encourage maximum independence but intervene and supervise when necessary  - Involve family in performance of ADLs  - Assess for home care needs following discharge   - Consider OT consult to assist with ADL evaluation and planning for discharge  - Provide patient education as appropriate  Outcome: Progressing  Goal: Maintains/Returns to pre admission functional level  Description: INTERVENTIONS:  - Set and communicate daily mobility goal to care team and patient/family/caregiver  - Collaborate with rehabilitation services on mobility goals if consulted  - Perform Range of Motion 3 times a day  - Reposition patient every 2 hours    - Dangle patient 3 times a day  - Stand patient 3 times a day  - Ambulate patient 3 times a day  - Out of bed to chair 3 times a day   - Out of bed for meals 3 times a day  - Out of bed for toileting  - Record patient progress and toleration of activity level   Outcome: Progressing     Problem: Prexisting or High Potential for Compromised Skin Integrity  Goal: Skin integrity is maintained or improved  Description: INTERVENTIONS:  - Identify patients at risk for skin breakdown  - Assess and monitor skin integrity  - Assess and monitor nutrition and hydration status  - Monitor labs   - Assess for incontinence   - Turn and reposition patient  - Assist with mobility/ambulation  - Relieve pressure over bony prominences  - Avoid friction and shearing  - Provide appropriate hygiene as needed including keeping skin clean and dry  - Evaluate need for skin moisturizer/barrier cream  - Collaborate with interdisciplinary team   - Patient/family teaching  - Consider wound care consult   Outcome: Progressing     Problem: Potential for Falls  Goal: Patient will remain free of falls  Description: INTERVENTIONS:  - Educate patient/family on patient safety including physical limitations  - Instruct patient to call for assistance with activity   - Consult OT/PT to assist with strengthening/mobility   - Keep Call bell within reach  - Keep bed low and locked with side rails adjusted as appropriate  - Keep care items and personal belongings within reach  - Initiate and maintain comfort rounds  - Make Fall Risk Sign visible to staff  - Offer Toileting every 2-4 Hours, in advance of need  - Initiate/Maintain bed/chair alarm  - Obtain necessary fall risk management equipment: nonskid footwear  - Apply yellow socks and bracelet for high fall risk patients  - Consider moving patient to room near nurses station  Outcome: Progressing     Problem: PAIN - ADULT  Goal: Verbalizes/displays adequate comfort level or baseline comfort level  Description: Interventions:  - Encourage patient to monitor pain and request assistance  - Assess pain using appropriate pain scale  - Administer analgesics based on type and severity of pain and evaluate response  - Implement non-pharmacological measures as appropriate and evaluate response  - Consider cultural and social influences on pain and pain management  - Notify physician/advanced practitioner if interventions unsuccessful or patient reports new pain  Outcome: Progressing     Problem: INFECTION - ADULT  Goal: Absence or prevention of progression during hospitalization  Description: INTERVENTIONS:  - Assess and monitor for signs and symptoms of infection  - Monitor lab/diagnostic results  - Monitor all insertion sites, i e  indwelling lines, tubes, and drains  - Monitor endotracheal if appropriate and nasal secretions for changes in amount and color  - [place] appropriate cooling/warming therapies per order  - Administer medications as ordered  - Instruct and encourage patient and family to use good hand hygiene technique  - Identify and instruct in appropriate isolation precautions for identified infection/condition  Outcome: Progressing     Problem: DISCHARGE PLANNING  Goal: Discharge to home or other facility with appropriate resources  Description: INTERVENTIONS:  - Identify barriers to discharge w/patient and caregiver  - Arrange for needed discharge resources and transportation as appropriate  - Identify discharge learning needs (meds, wound care, etc )  - Arrange for interpretive services to assist at discharge as needed  - Refer to Case Management Department for coordinating discharge planning if the patient needs post-hospital services based on physician/advanced practitioner order or complex needs related to functional status, cognitive ability, or social support system  Outcome: Progressing     Problem: Nutrition/Hydration-ADULT  Goal: Nutrient/Hydration intake appropriate for improving, restoring or maintaining nutritional needs  Description: Monitor and assess patient's nutrition/hydration status for malnutrition  Collaborate with interdisciplinary team and initiate plan and interventions as ordered  Monitor patient's weight and dietary intake as ordered or per policy  Utilize nutrition screening tool and intervene as necessary  Determine patient's food preferences and provide high-protein, high-caloric foods as appropriate       INTERVENTIONS:  - Monitor oral intake, urinary output, labs, and treatment plans  - Assess nutrition and hydration status and recommend course of action  - Evaluate amount of meals eaten  - Assist patient with eating if necessary   - Allow adequate time for meals  - Recommend/ encourage appropriate diets, oral nutritional supplements, and vitamin/mineral supplements  - Order, calculate, and assess calorie counts as needed  - Recommend, monitor, and adjust tube feedings and TPN/PPN based on assessed needs  - Assess need for intravenous fluids  - Provide specific nutrition/hydration education as appropriate  - Include patient/family/caregiver in decisions related to nutrition  Outcome: Progressing

## 2022-09-10 NOTE — PROGRESS NOTES
Physical Medicine and Rehabilitation Progress Note  [facility] 80 y o  female MRN: [id number]  Unit/Bed#: -01 Encounter: [id number]      Assessment & Plan:   Decline in ADLs and mobility: Functional assessment - mobility improving some today                                                    FIM  Care Score   Admit Score Recent Score    Total assist  1-100% or 2p    Tot       Max assist 2-51-75%    Sub   To hygiene, LBD    Mod assist 3- 26-50%  Par   Bathing   Min assist 3- 25% or < Par      CG assist 4  TA       Sup/Setup 4-5 Sup   UBD    Mod-I/Indep 6 MI         Transfers     Mod assist      Ambulation      10 ft mod asisst     Stairs    Total assist/NT     WC mobility 50 ft significant assist to 50 ft mod assist   Major barriers:  Hemiparesis, impaired balance, coordination  Dispo & ELOS: patient cleared for d/c pending SNF auth/bed availability/acceptance        Transient neurological symptoms  Assessment & Plan  - Patient reports less symptoms overnight and feels better today    Intermittent transient blinking quickly, head shaking, increased L sided shaking, 1 word nonsensical word substitution, atypical occasional spitting sputum   - Spoke with neuro 9/9 - above felt to be largely psychogenic in etiology   - routine EEG 9/8 - unremarkable   - CT head 9/7 - without acute concerns; Mildly improved gray-white matter differentiation right MCA territory compared to August 15  No hemorrhagic conversion, laminar necrosis, or mass lesion   - Neuro follow-up 9/8   "Neurology did see patient in terms some abnormal behaviors they had a low suspicion for seizure they did order an EEG we will wait the results of this  Head CT was negative for any new findings  Will continue to monitor for abnormal behavior although may be focused on psychogenic Foundation  "   - Neuro exam stable, vitals stable; CBC/CMP/TSH wnl  - IM team does not recommend additional med work-up   - May be behavioral/psychogenic   - Optimal mood mgmt (Adjustment d/o with mixed emotional symptoms), sleep mgmt, activity/skilled PT/OT/ST   - D/C'd baclofen recently   - OP follow-up with PCP/neuro       * Arterial ischemic stroke, MCA (middle cerebral artery), right, acute [facility])  Assessment & Plan  - 9/9 - Neuro exam stable  - CT head 9/7 - Mildly improved gray-white matter differentiation right MCA territory compared to August 15  No hemorrhagic conversion, laminar necrosis, or mass lesion   - Slow functional recovery - will need more extended IP rehab course - patient will need to be higher level prior to returning to community   - R MCA CVA   - CTA head/neck - near occlusive thrombus at the R MCA bifurcation, mild beading of the mid to distal ICAs suspicious for mild fibromuscular dysplasia, and mild atherosclerotic disease at bilateral distal carotid arteries  - no MRI because pacemaker not compatible    - Residual impairments on admission to [facility]: L spastic hemiparesis, bowel/bladder dysfunction, possible cog impairments (assess for other impairments during ARC course)     Secondary stroke prevention  - repeat CT angio head and neck in 3 months to re-evaluate thrombus is a outpatient  - Antithrombotic: Pradaxa (switched from Xarelto for possible failure)   - Statin  - Optimal management of blood pressure and diabetes  - Patient at increased risk for stroke particularly early in post-stroke period - monitor neuro exam closely with low threshold to repeat imaging  -  patient and if applicable caregiver on optimal stroke management  - Follow-up with neurology and PCP after d/c   - Continue acute comprehensive interdisciplinary inpatient rehabilitation to include intensive skilled therapies (PT, OT, ST) as outlined with oversight and management by rehabilitation physician as well as inpatient rehab level nursing, case management and weekly interdisciplinary team meetings     - patient does have fair left hand and wrist strength; can use resting hand splint for a few hours during the day and overnight but to not overuse and encourage use hand and wrist  - multi Podus boot on left foot patient can breaks to potentially decrease risk of contracture/skin breakdown   - patient has spastic left upper extremity making subluxation less likely although continue subluxation precautions      Healthcare maintenance  Assessment & Plan  Per CM patient requested to have Covid booster prior to SNF  - Discussed with pt/son 9/7, 9/9 - ok with risks and injection  - Covid 19 booster ordered   Discharge on magnesium daily   Continue for now  Monitor level  Spastic hemiparesis of left dominant side as late effect of cerebral infarction [facility])  Assessment & Plan  Stable today off baclofen   Fairly significant but given some mild disorientation/confusion at times we have stopped baclofen 9/9  Left upper extremity significantly (also some L neck tightness/spasms)  Modified Sada scale 3 in shoulder, and 2 in her elbow  - Has flexion synergy    - Has clonus in pronators and wrist flexors        Prior PMR provider was consider IP botox injections but not currently available > will need OP follow-up PMR  Gentle range of motion with therapy  Patient does have movement of the hand and wrist and do want to encourage that - can still consider resting hand splint intermittently  Skilled PT OT  Optimal skin hygiene    At risk for delirium  Assessment & Plan  - Possible mild confusion at times but overall stable  - Hold baclofen   - Vitals, labs stable   - continue to monitor closely  - impaired sleep on melatonin 6mg HS > continue for now   - Recommend sleep log  - Limit sedating meds when possible/appropriate     Adjustment disorder with mixed emotional features  Assessment & Plan  Mood somewhat better today   Adjustment Disorder with Mixed Emotional Features per psych   Supportive counseling  If needed consider medication in future   Psychology consult while in [facility]       Neck pain  Assessment & Plan  Stable   With taut muscle bands in left cervical paraspinal and trapezius areas  Gentle stretching  Hold baclofen for now with recent mild confusion    Bengay helpful  Heat not helpful  Manual therapies/modalities helpful     PRN APAP    Urinary retention  Assessment & Plan  Improved recently   Required SC x1 9/6 with elevated scan but output was only 350; did have SC 9/4 of 550   - Continue Keflex for 7 day course thru 9/12 per chart review and discussion with IM (UA+, 60-60K Proteus (not technically UTI but recommended to treat recently by on-call PMR as this is stone forming organism, also in context of ongoing urinary dysfunction, recent AMS, UTI risk) - discussed also with IM   - Continue recently started flomax - continue   - Continue bladder scan Q4H, monitor output and PVRs, straight cath >450 or if uncomfortable 250-449   - If high SC's in spite of protocol recommend inserting indwelling contreras for bladder rest and future voiding trial  - monitor for retention, incontinence, signs/symptoms of UTI  - ensure optimal bowel management > p/w significant constipation  - recommend toileting program Q2-4 H during day and Q4-6H overnight       DARRIUS (acute kidney injury) [facility])  Assessment & Plan  Remains resolved   Medicine consulted  Hold Lasix and Aldactone - likely decreased intake in setting of CVA   Resume diuretics at discretion of medicine  Monitor labs and output    Cough  Assessment & Plan  9/6-8 - not significant on my exam; lungs remain CTA; pt afebrile   VBS 9/6   - Per SLP - swallow looked good, no aspiration > continue Regular diet   - Patient stated things come up sometimes (but not seen on VBS) - when they panned down esophagus very small amounts of liquid retropulsed a little but then cleared down stomach   Spits out at times and feels like something in throat; intermittent atypical coughing at times  Lung exam remains clear  Saturating well on RA, patient afebrile, without leukocytosis  - Lung exam fairly clear  - CXR 8/25 negative   - Tessalon Perles TID, PRN Robitussin  - PPI for GERD (patient had been on H2 blocker not PPI and we switched today to see if that could help)   - Comgmt with internal medicine   Nystatin per IM for possible mild thrush on tongue and risk of esophageal involvement  - Low threshold to repeat CXR, risk of aspiration but on regular diet per SLP still - IM feels not needed and lung exam unremarkable on my eval  - Did have some consideration for tracheobronchitis earlier and she had 3 days of Azithromycin but IM does not feel this is etiology   - If needed OP GI follow-up         Thrush  Assessment & Plan  Nystatin per IM for possible mild thrush on tongue and risk of esophageal involvement    CAD (coronary artery disease)  Assessment & Plan  IM consulted and with overall management at their discretion during ARC course  Antithrombotic: Pradaxa  Statin  Optimal blood pressure   Outpatient Cardiology follow-up    Per IM  · "Nonobstructive  · Card cath 8/2021 done for borderline abn stress test and found 50% mid RCA/40% mid LAD = no intervention done  · Continue statin  · Was not on ASA as an OP"       Valvular heart disease  Assessment & Plan  Per IM  · "Current ECHO:  LVEF 65%, mod AR/mild AS, mod TR with severely elevated RV systolic pressure, mild-mod MR, mod LAE/mild NEO  · Euvolemic currently but will watch since CTA on admission showed pulm edema and b/l pleural effusions"  -diuretics at their discretion   -Outpatient cardiology follow-up    At risk for venous thromboembolism (VTE)  Assessment & Plan  SCDs, ambulation, and Pradaxa       Pacemaker  Assessment & Plan  Not MRI compatible    Chronic atrial fibrillation ([facility] [address] )  Assessment & Plan  IM consulted and with overall management at their discretion during ARC course  Rate control: Verapamil, Nebivolol   Antithrombotic: Pradaxa       Stage 3 chronic kidney disease ([facility] [address] )  Assessment & Plan  Lab Results Component Value Date    EGFR 45 09/05/2022    EGFR 44 09/02/2022    EGFR 50 09/01/2022    CREATININE 1 12 09/05/2022    CREATININE 1 14 09/02/2022    CREATININE 1 04 09/01/2022     Stable   Given IVF earlier in course   Avoid nephrotoxic meds  Monitor voiding status  IM consulted  Hyperlipidemia LDL goal <100  Assessment & Plan  Statin    Essential hypertension  Assessment & Plan  Internal medicine consulted and co-management with their service  Monitor vitals with and without activity; monitor for orthostasis  Monitor hemoglobin, electrolytes, kidney function, hydration status   Current meds:  Losartan, nebivolol, Verapamil 240mg daily  - Lasix and Aldactone on hold      Other chest pain  Assessment & Plan  Denies CP currently - remains resolved    Per prior provider  "8/25 with chest pain, ultimately felt to be 2/2 to spasticity  Improved with baclofen/robaxin  - delta hsTrop at 2 hr was -1    - EKG showed paced rhythm, no evidence of ischemia   - CXR unremarkable   - 30 min later had another episode that felt more like pressure  Rapid called, but not concerning for ACS  Generally reproducible   - Increased Baclofen and frequency of Gerilyn Ivans  Has a cough with it, and lungs sound good, and CXR unremarkable  - May also be component of GERD  Started on pepcid  Also on throat spray  Fluticasone was irritating and didn't make a difference    - Had SLP eval swallow - which was fine"      Other Medical Issues:   None    Follow-up providers and other issues to be followed up after discharge  PCP   Neurology   Cardiology    CODE: Level 1: Full Code    Restrictions include: Fall precautions    Objective: Allergies per EMR  Diagnostic Studies: Reviewed, no new imaging  CT head wo contrast   Final Result by Inge Washington DO (09/07 2016)   Mildly improved gray-white matter differentiation right MCA territory compared to August 15  No hemorrhagic conversion, laminar necrosis, or mass lesion  Workstation performed: [id number]         FL barium swallow video w speech   Final Result by ARTEMIO GONZALEZ (09/06 1551)      XR chest portable   Final Result by Anshul Everett MD (27/86 2260)      No acute cardiopulmonary disease                    Workstation performed: [id number]           See above as well    Laboratory: Labs reviewed  Results from last 7 days   Lab Units 09/07/22  1507 09/05/22  0428   HEMOGLOBIN g/dL 11 5 10 7*   HEMATOCRIT % 35 0 34 5*   WBC Thousand/uL 7 16 5 88     Results from last 7 days   Lab Units 09/07/22  1507 09/05/22  0428   BUN mg/dL 17 29*   POTASSIUM mmol/L 4 1 4 0   CHLORIDE mmol/L 106 107   CREATININE mg/dL 0 92 1 12   AST U/L 20  --    ALT U/L 21  --               Drug regimen reviewed, all potential adverse effects identified and addressed:    Scheduled Meds:  Current Facility-Administered Medications   Medication Dose Route Frequency Provider Last Rate    acetaminophen  650 mg Oral Q6H PRN Jason Forte MD      benzonatate  200 mg Oral TID Frazier Litten, CRNP      bisacodyl  10 mg Rectal Daily PRN Jason Forte MD      cephalexin  500 mg Oral Q6H [address] Jason Forte MD      dabigatran etexilate  150 mg Oral Q12H [address] Jason Forte MD      guaiFENesin  600 mg Oral Q12H [address] TOMY Hwang      losartan  50 mg Oral Daily TOMY Hwang      magnesium oxide  400 mg Oral Daily Jason Forte MD      melatonin  6 mg Oral QPM Edison Bradley DO      menthol-methyl salicylate   Apply externally BID Rich Soto MD      nebivolol  5 mg Oral Daily Jason Forte MD      nitroglycerin  0 4 mg Sublingual Q5 Min PRN Frazier Litten, CRNP      nystatin  500,000 Units Swish & Swallow 4x Daily OTMY Hwang      pantoprazole  40 mg Oral Early Morning Jason Forte MD      phenol  1 spray Mouth/Throat Q2H PRN Rich Soto MD      polyethylene glycol  17 g Oral Daily PRN Jason Forte MD     [facility] pravastatin  40 mg Oral Daily With Mary Russell MD      tamsulosin  0 4 mg Oral Daily With Danis Martinez MD      verapamil  240 mg Oral HS Migdalia Stroud MD           acetaminophen    bisacodyl    nitroglycerin    phenol    polyethylene glycol      Chief Complaints:   Rehab follow-up     Subjective:     Patient feels better today and states she slept fairly well  She denies significant episodes of eye blinking, head moving, nonsensical words  She reports strength is stable and denies increased tightness, SOB, cough, or other new complaints  ROS: A 10 point ROS was performed; negative except as noted above  Physical Exam:  09/09/22 0500 97 7 °F (36 5 °C) 60 16 122/61 88 98 % None (Room air)      Vitals above reviewed on date of encounter    GEN:  Lying in bed in NAD   HEENT/NECK: MMM  CARDIAC: Regular rate rhythm, no murmers, no rubs, no gallops  LUNGS:  clear to auscultation, no wheezes, rales, or rhonchi  ABDOMEN: Soft, non-tender, non-distended, normal active bowel sounds  EXTREMITIES/SKIN:  no calf edema, no calf tenderness to palpation  NEURO:   MENTAL STATUS: awake, oriented to person, place, time, and situation, MENTAL STATUS:  Appropriate wakefulness and interaction , CN II-XII: Mild L facial weakness otherwise intact, Strength/MMT:  Stable and NA  PSYCH:  Affect:  Euthymic    HPI:  55-year-old female with a past medical history of AFib recently on Xarelto, sick sinus syndrome status status post pacemaker placement that is not MRI compatible, hypertension, hyperlipidemia, valvular disease, coronary artery disease developed dense left-sided weakness, dysarthria and presented to the hospital on 08/12/2022  She had systolic blood pressure of 200 on arrival   CT head showed focal area of gliosis within the high right post central gyrus and scattered chronic microvascular ischemic changes with more focal lucency along the anterior limb of the right internal capsule    CTA of head and neck showed near occlusive thrombus at the right MCA bifurcation, mild beating of the mid to distal ICA suspicious mild fibromuscular dysplasia and mild atherosclerotic disease at the bilateral distal carotid arteries  She was not a candidate for MRI due to pacemaker  CT cerebral perfusion study showed 8 mL of mismatch  She was not a candidate for tPA due to bleeding risk from being on Xarelto  She was not a thrombectomy candidate for neurointerventional   Patient was recommended to transition to Pradaxa  Patient was evaluated by skilled therapies and was found to have significant decline in ADLs and ambulation and appears appropriate for admission to [facility]  ** Please Note: Fluency Direct voice to text software may have been used in the creation of this document  **    I personally performed the required components and examined the patient myself in person on 9/9/2022

## 2022-09-10 NOTE — PROGRESS NOTES
PM&R Coverage Progress Note:    Rehabilitation Diagnosis: R MCA CVA    ASSESSMENT: Stable      PLAN:    Rehabilitation   Continue current rehabilitation plan of care to maximize function   No funcitonal barriers identified   Awaiting SNF bed availability    Medical issues   No acute concerns  Patient subjectively feels her head shaking is better today and she can control it easier   Continue current medical plan of care  Appreciate IM consultants co-management  SUBJECTIVE: Patient seen face to face  No acute issues  Son visiting at bedside  Patient overall feeling better today  ROS:  A ten point review of systems was completed on 09/10/22 and pertinent positives are listed in subjective section  All other systems reviewed were negative  OBJECTIVE:   /58 (BP Location: Right arm)   Pulse 60   Temp 98 8 °F (37 1 °C) (Oral)   Resp 19   Ht 5' 3" (1 6 m)   Wt 63 1 kg (139 lb 1 8 oz)   SpO2 99%   BMI 24 64 kg/m²     Physical Exam  Vitals and nursing note reviewed  Constitutional:       General: She is not in acute distress  HENT:      Head: Normocephalic and atraumatic  Nose: Nose normal       Mouth/Throat:      Mouth: Mucous membranes are moist    Eyes:      Conjunctiva/sclera: Conjunctivae normal    Cardiovascular:      Rate and Rhythm: Normal rate and regular rhythm  Pulses: Normal pulses  Pulmonary:      Effort: Pulmonary effort is normal       Breath sounds: Normal breath sounds  No wheezing or rales  Abdominal:      General: Bowel sounds are normal  There is no distension  Palpations: Abdomen is soft  Tenderness: There is no abdominal tenderness  Musculoskeletal:         General: No swelling  Cervical back: Neck supple  Comments: Improving spasticity Left hemibody   Skin:     General: Skin is warm  Neurological:      Mental Status: She is alert and oriented to person, place, and time  Motor: Weakness (Left hemiparesis) present  Psychiatric:         Mood and Affect: Mood normal           Lab Results   Component Value Date    WBC 7 16 09/07/2022    HGB 11 5 09/07/2022    HCT 35 0 09/07/2022    MCV 93 09/07/2022     09/07/2022     Lab Results   Component Value Date    SODIUM 136 09/07/2022    K 4 1 09/07/2022     09/07/2022    CO2 23 09/07/2022    BUN 17 09/07/2022    CREATININE 0 92 09/07/2022    GLUC 85 09/07/2022    CALCIUM 9 4 09/07/2022     Lab Results   Component Value Date    INR 1 57 (H) 08/12/2022    INR 1 86 (H) 08/12/2022    INR 2 60 (H) 10/23/2018    PROTIME 19 0 (H) 08/12/2022    PROTIME 21 7 (H) 08/12/2022    PROTIME 27 9 (H) 10/23/2018           Current Facility-Administered Medications:     acetaminophen (TYLENOL) tablet 650 mg, 650 mg, Oral, Q6H PRN, Solange Ardon MD, 650 mg at 09/09/22 0207    benzonatate (TESSALON PERLES) capsule 200 mg, 200 mg, Oral, TID, TOMY Pitts, 200 mg at 09/10/22 0776    bisacodyl (DULCOLAX) rectal suppository 10 mg, 10 mg, Rectal, Daily PRN, Solange Ardon MD, 10 mg at 09/03/22 1818    cephalexin (KEFLEX) capsule 500 mg, 500 mg, Oral, Q6H [address], Solnage Ardon MD, 500 mg at 09/10/22 0542    dabigatran etexilate (PRADAXA) capsule 150 mg, 150 mg, Oral, Q12H [address], Solange Ardon MD, 150 mg at 09/10/22 0835    guaiFENesin (MUCINEX) 12 hr tablet 600 mg, 600 mg, Oral, Q12H [address], TOMY Pitts, 600 mg at 09/10/22 0835    losartan (COZAAR) tablet 50 mg, 50 mg, Oral, Daily, TOMY Pitts, 50 mg at 09/10/22 0836    magnesium oxide (MAG-OX) tablet 400 mg, 400 mg, Oral, Daily, Solange Ardon MD, 400 mg at 09/10/22 0835    melatonin tablet 6 mg, 6 mg, Oral, QPM, Belkys Spain DO, 6 mg at 09/09/22 2127    menthol-methyl salicylate (BENGAY) 47-23 % cream, , Apply externally, BID, Karli Murillo MD, 1 application at 24/67/17 0836    nebivolol (BYSTOLIC) tablet 5 mg, 5 mg, Oral, Daily, Solange Ardon MD, 5 mg at 09/10/22 0836    nitroglycerin (NITROSTAT) SL tablet 0 4 mg, 0 4 mg, Sublingual, Q5 Min PRN, TOMY Angel    nystatin (MYCOSTATIN) oral suspension 500,000 Units, 500,000 Units, Swish & Swallow, 4x Daily, TOMY Angel, 500,000 Units at 09/10/22 0835    pantoprazole (PROTONIX) EC tablet 40 mg, 40 mg, Oral, Early Morning, Lucia Donovan MD, 40 mg at 09/10/22 0542    phenol (CHLORASEPTIC) 1 4 % mucosal liquid 1 spray, 1 spray, Mouth/Throat, Q2H PRN, Zuleima Abdullahi MD, 1 spray at 09/03/22 1515    polyethylene glycol (MIRALAX) packet 17 g, 17 g, Oral, Daily PRN, Lucia Donovan MD, 17 g at 08/31/22 0705    pravastatin (PRAVACHOL) tablet 40 mg, 40 mg, Oral, Daily With Ruffus Aschoff, MD, 40 mg at 09/09/22 1543    tamsulosin (FLOMAX) capsule 0 4 mg, 0 4 mg, Oral, Daily With Arabella England MD, 0 4 mg at 09/09/22 1543    verapamil (CALAN-SR) CR tablet 240 mg, 240 mg, Oral, HS, Lucia Donovan MD, 240 mg at 09/09/22 2127    Past Medical History:   Diagnosis Date    A-fib [facility])     Anemia     Arthritis     Breast pain, right     Chest pain on breathing     Colon polyp     Cough     Diverticulosis     Encounter for screening colonoscopy     H/O sick sinus syndrome     Heart murmur     High cholesterol     History of atrial fibrillation     Rate ontrolled  On xarelto 15mg (creatinine 50) Followed by Dr Floyde Spurling with Cardiology     History of weight loss     Report loose fitting clothes  Weight stable (3lbs difference)  Last colo showed small tubular adenoma x2  Breast biopsy last showed fibrocystic changes  TSH wnl  Will check cbc, bmp, spep          Hx of long term use of blood thinners     Hypertension     Irregular heart beat     Pacemaker     Preop examination     Shortness of breath     Viral bronchitis        Patient Active Problem List    Diagnosis Date Noted    Transient neurological symptoms 09/08/2022   [place] 09/06/2022    At risk for delirium 09/06/2022    Cough 09/03/2022    Valvular heart disease 08/20/2022    CAD (coronary artery disease) 08/20/2022    Urinary retention 08/20/2022    Neck pain 08/20/2022    Healthcare maintenance 08/20/2022    Adjustment disorder with mixed emotional features 08/20/2022    Anemia 08/19/2022    At risk for venous thromboembolism (VTE) 08/19/2022    DARRIUS (acute kidney injury) ([facility] [address] ) 08/19/2022    Spastic hemiparesis of left dominant side as late effect of cerebral infarction ([address] ) 08/19/2022    Arterial ischemic stroke, MCA (middle cerebral artery), right, acute ([address] ) 08/12/2022    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 ([facility] [address] ) 08/12/2022    Renal insufficiency     Abnormal nuclear stress test 08/20/2021    Diverticulosis of colon 05/20/2019    Pacemaker 02/26/2018    Tubulovillous adenoma 02/09/2018    Gastroesophageal reflux disease without esophagitis 02/09/2018    Other chest pain 12/08/2017    Essential hypertension 12/08/2017    Hyperlipidemia LDL goal <100 12/08/2017    Stage 3 chronic kidney disease ([address] ) 12/08/2017    Osteoarthritis of both wrists 12/08/2017    Chronic atrial fibrillation ([address] ) 12/08/2017    Cavus deformity of foot 06/27/2017    Hallux abducto valgus, bilateral 06/27/2017    Lipoma of right upper extremity 05/17/2017    Pain due to onychomycosis of toenails of both feet 01/20/2017    Allergic rhinitis due to pollen 10/12/2015    Midline cystocele 12/23/2014    Osteoarthritis of hip 07/17/2014    B12 deficiency 12/06/2013    Osteopenia 11/15/2013    Left renal artery stenosis ([address] ) 08/27/2013    Left atrial enlargement 08/27/2013          Eddie Villafana MD  PM&R    Total time spent:  20 minutes with more than 50% spent counseling/coordinating care  Counseling includes discussion with patient re: progress and discussion with patient of his/her current medical/functional state/information  Coordination of patient's care was performed in conjunction with consulting services   Time invested included review of patient's labs, vitals, and management of their comorbidities with continued monitoring  The care of the patient was extensively discussed and appropriate treatment plan was formulated unique for this patient  ** Please Note:  voice to text software may have been used in the creation of this document   Although proof errors in transcription or interpretation are a potential of such software**

## 2022-09-10 NOTE — PROGRESS NOTES
Internal Medicine Progress Note  Patient: Enrike Alvarado  Age/sex: 80 y o  female  Medical Record #: [id number]      ASSESSMENT/PLAN: (Interval History)  Enrike Alvarado is seen and examined and management for following issues:    Acute embolic right MCA CVA  · Had near occlusion of right MCA  · Felt to be a Xarelto failure and was switched to Pradaxa  · ECHO w/o thrombus  · Continue statin  · Baclofen discontinued  · On 9/2, 9/5 and 9/6 states she is saying a specific word that means nothing  She will repeat a particular word that is not really a word and she knows it  Sofi Divine speaks fluently about it at the same time  [facility] neuro exam has been unchanged   Dr Michelle Ayoub d/w Neuro on 9/5 and they felt sx don't seem to be neurological = med related?   EEG normal  Head CT negative for new findings  Neurology suspects the episodes are not neurologic in nature and does not recommend any further neurologic work-up      Chronic atrial fibrillation  · Sees Dr Margret Bentley as OP  · Rates controlled  · Cont Pradaxa/Bystolic     PPM  · VVI  · Is not MRI conditional  · 100% paced on EKG     Valvular disease  · Current ECHO:  LVEF 65%, mod AR/mild AS, mod TR with severely elevated RV systolic pressure, mild-mod MR, mod LAE/mild NEO  · Euvolemic currently     HTN  · Home:  Verapamil 240mg qhs/Cozaar 634 mg qd/Bystolic 5mg qd/lasix 89PJ qd/Aldactone 25mg qd  · Here:  Verapamil 240mg qhs/Cozaar 52CU qd/Bystolic 5mg qd  · OP note states has left RA stenosis but no testing in OP records or Care Everywhere to verify  · BP was getting a bit soft at times so starting 9/6, reduced Cozaar to 50mg qd and has improved  · No changes today     HLD  · Home:  Crestor 10mg qd = Neuro rec when discharged to reduce to 5mg qd  · Here:  Pravachol 40mg qd     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  · S/p NS 1 liter   · Back to baseline = as OP GFR 50's to 60's    GFR here 40's to 60's  · Continue to hold Lasix and Aldactone for now     CAD  · Nonobstructive  · Card cath 8/2021 done for borderline abn stress test and found 50% mid RCA/40% mid LAD = no intervention done  · Continue statin  · Was not on ASA as an OP     Hypomagnesemia  · Cont 400 mg MagOx  · stable     Left chest pain  · Had left neck pain (not new for her) then pain under left breast 8/31/22 = reproducible with palpation  · Cardiac w/u negative  · Etio was her left arm spasticity   · 2/2 developing odd behaviors over her time here in [facility] so Baclofen reduced 9/5 to 5mg TID      Cough   · CXR negative 8/25  · Has scheduled Tessalon perles 200mg TID/Chlorarseptic spray/pepcid/Mucinex  · Started z-pack 9/2 in case has tracheobronchitis which she had a x3 days and stopped when Keflex started by PMR for asymp bacteruria   · Flonase was not effective  · Cozaar likely not the cause since has been on for a long time  · Says feels like something in throat and constantly spits out her saliva = per ST has no previous issues eating/drinking but since no improvement --> D/W Dr Therese Glez and ST --> s/p VBS 9/6 w/o acute findings  · May have some thrush as below but overall likely behavioral     Possible thrush  · On tongue patel have some mild thrush  · If so, then could have some esophagitis from it as well  · Continue empiric Mycostatin      Urine retention  · Improved  · Management per PMR  · Flomax added on 8/31/22  · Urine culture is >60,000-69,000 proteus = no sx but PMR tx'ing with Keflex empirically     Vaginal yeast infection  · S/p Monistat pack/Diflucan 150mg x 1         DC planning: DC pending SNF bed        The above assessment and plan was reviewed and updated as determined by my evaluation of the patient on 9/10/2022      Labs:   Results from last 7 days   Lab Units 09/07/22  1507 09/05/22  0428   WBC Thousand/uL 7 16 5 88   HEMOGLOBIN g/dL 11 5 10 7*   HEMATOCRIT % 35 0 34 5*   PLATELETS Thousands/uL 237 227     Results from last 7 days   Lab Units 09/07/22  1507 09/05/22  0428   SODIUM mmol/L 136 137   POTASSIUM mmol/L 4 1 4 0   CHLORIDE mmol/L 106 107   CO2 mmol/L 23 24   BUN mg/dL 17 29*   CREATININE mg/dL 0 92 1 12   CALCIUM mg/dL 9 4 9 2             Results from last 7 days   Lab Units 09/09/22  0600   POC GLUCOSE mg/dl 91       Review of Scheduled Meds:  Current Facility-Administered Medications   Medication Dose Route Frequency Provider Last Rate    acetaminophen  650 mg Oral Q6H PRN Crystal Chambers MD      benzonatate  200 mg Oral TID Oliver TOMY Haynes      bisacodyl  10 mg Rectal Daily PRN Crystal Chambers MD      cephalexin  500 mg Oral Q6H [address] Crystal Chambers MD      dabigatran etexilate  150 mg Oral Q12H [address] Crystal Chambers MD      guaiFENesin  600 mg Oral Q12H [address] Miguelitona TOMY Batista      losartan  50 mg Oral Daily TOMY Cunningham      magnesium oxide  400 mg Oral Daily Crystal Chambers MD      melatonin  6 mg Oral QPM Johana German DO      menthol-methyl salicylate   Apply externally BID Kenisha Hu MD      nebivolol  5 mg Oral Daily Crystal Chambers MD      nitroglycerin  0 4 mg Sublingual Q5 Min PRN TOMY Hernandez      nystatin  500,000 Units Swish & Swallow 4x Daily TOMY Cunningham      pantoprazole  40 mg Oral Early Morning Crystal Chambers MD      phenol  1 spray Mouth/Throat Q2H PRN Kenisha Hu MD      polyethylene glycol  17 g Oral Daily PRN Crystal Chambers MD      pravastatin  40 mg Oral Daily With Geovanna Hamilton MD      tamsulosin  0 4 mg Oral Daily With Vannesa Langley MD      verapamil  240 mg Oral HS Crystal Chambers MD         Subjective/ HPI: Patient seen and examined  Patients overnight issues or events were reviewed with nursing or staff during rounds or morning huddle session  New or overnight issues include the following:     Pt seen in her room  She denies any current complaints  ROS:   A 10 point ROS was performed; negative except as noted above         Imaging:     CT head wo contrast   Final Result by Norma Schmitz Ignacia Body, DO (09/07 2016)   Mildly improved gray-white matter differentiation right MCA territory compared to August 15  No hemorrhagic conversion, laminar necrosis, or mass lesion  Workstation performed: [id number]         FL barium swallow video w speech   Final Result by ARTEMIO GONZALEZ (09/06 [id number])      XR chest portable   Final Result by Avelino Donato MD (73/73 [id number])      No acute cardiopulmonary disease  Workstation performed: [id number]             *Labs /Radiology studies reviewed  *Medications reviewed and reconciled as needed  *Please refer to order section for additional ordered labs studies  *Case discussed with primary attending during morning huddle case rounds    Physical Examination:  Vitals:   Vitals:    09/09/22 0854 09/09/22 1409 09/09/22 2020 09/10/22 0615   BP: 109/55 111/56 122/57 107/58   BP Location: Left arm Left arm Left arm Right arm   Pulse: 60 60 67 60   Resp: 16 16 16 19   Temp:  97 8 °F (36 6 °C) 97 9 °F (36 6 °C) 98 8 °F (37 1 °C)   TempSrc:  Oral Oral Oral   SpO2:  97% 98% 99%   Weight:       Height:         General Appearance: no distress, conversive  HEENT: PERRLA, conjuctiva normal; oropharynx clear; mucous membranes moist   Neck:  Supple, normal ROM  Lungs: CTA, normal respiratory effort, no retractions, expiratory effort normal  CV: regular rate and rhythm; no rubs/murmurs/gallops, PMI normal   ABD: soft; ND/NT; +BS  EXT: no edema  Skin: normal turgor, normal texture, no rashes  Psych: affect normal, mood normal  Neuro: AA      The above physical exam was reviewed and updated as determined by my evaluation of the patient on 9/10/2022  Invasive Devices  Report    None                    VTE Pharmacologic Prophylaxis: Pradaxa  Code Status: Level 1 - Full Code  Current Length of Stay: 22 day(s)      Total time spent:  30 minutes with more than 50% spent counseling/coordinating care   Counseling includes discussion with patient re: progress  and discussion with patient of his/her current medical state/information  Coordination of patient's care was performed in conjunction with primary service  Time invested included review of patient's labs, vitals, and management of their comorbidities with continued monitoring  In addition, this patient was discussed with medical team including physician and advanced extenders  The care of the patient was extensively discussed and appropriate treatment plan was formulated unique for this patient  ** Please Note:  voice to text software may have been used in the creation of this document   Although proof errors in transcription or interpretation are a potential of such software**

## 2022-09-11 PROCEDURE — 97116 GAIT TRAINING THERAPY: CPT

## 2022-09-11 PROCEDURE — 97112 NEUROMUSCULAR REEDUCATION: CPT

## 2022-09-11 PROCEDURE — 97530 THERAPEUTIC ACTIVITIES: CPT

## 2022-09-11 PROCEDURE — 97110 THERAPEUTIC EXERCISES: CPT

## 2022-09-11 PROCEDURE — 99232 SBSQ HOSP IP/OBS MODERATE 35: CPT | Performed by: PHYSICIAN ASSISTANT

## 2022-09-11 PROCEDURE — 97129 THER IVNTJ 1ST 15 MIN: CPT

## 2022-09-11 PROCEDURE — 97535 SELF CARE MNGMENT TRAINING: CPT

## 2022-09-11 RX ADMIN — MAGNESIUM OXIDE TAB 400 MG (241.3 MG ELEMENTAL MG) 400 MG: 400 (241.3 MG) TAB at 07:50

## 2022-09-11 RX ADMIN — NYSTATIN 500000 UNITS: 100000 SUSPENSION ORAL at 17:41

## 2022-09-11 RX ADMIN — MENTHOL, METHYL SALICYLATE 1 APPLICATION: 10; 15 CREAM TOPICAL at 07:51

## 2022-09-11 RX ADMIN — CEPHALEXIN 500 MG: 500 CAPSULE ORAL at 05:13

## 2022-09-11 RX ADMIN — MENTHOL, METHYL SALICYLATE 1 APPLICATION: 10; 15 CREAM TOPICAL at 17:45

## 2022-09-11 RX ADMIN — LOSARTAN POTASSIUM 50 MG: 50 TABLET, FILM COATED ORAL at 07:49

## 2022-09-11 RX ADMIN — Medication 6 MG: at 21:49

## 2022-09-11 RX ADMIN — DABIGATRAN ETEXILATE MESYLATE 150 MG: 150 CAPSULE ORAL at 21:45

## 2022-09-11 RX ADMIN — POLYETHYLENE GLYCOL 3350 17 G: 17 POWDER, FOR SOLUTION ORAL at 14:39

## 2022-09-11 RX ADMIN — NYSTATIN 500000 UNITS: 100000 SUSPENSION ORAL at 07:50

## 2022-09-11 RX ADMIN — NEBIVOLOL 5 MG: 5 TABLET ORAL at 11:54

## 2022-09-11 RX ADMIN — GUAIFENESIN 600 MG: 600 TABLET, EXTENDED RELEASE ORAL at 21:45

## 2022-09-11 RX ADMIN — VERAPAMIL HYDROCHLORIDE 240 MG: 240 TABLET ORAL at 21:50

## 2022-09-11 RX ADMIN — BENZONATATE 200 MG: 100 CAPSULE ORAL at 17:41

## 2022-09-11 RX ADMIN — BENZONATATE 200 MG: 100 CAPSULE ORAL at 07:49

## 2022-09-11 RX ADMIN — GUAIFENESIN 600 MG: 600 TABLET, EXTENDED RELEASE ORAL at 07:50

## 2022-09-11 RX ADMIN — CEPHALEXIN 500 MG: 500 CAPSULE ORAL at 17:41

## 2022-09-11 RX ADMIN — PRAVASTATIN SODIUM 40 MG: 40 TABLET ORAL at 17:41

## 2022-09-11 RX ADMIN — NYSTATIN 500000 UNITS: 100000 SUSPENSION ORAL at 11:53

## 2022-09-11 RX ADMIN — BENZONATATE 200 MG: 100 CAPSULE ORAL at 21:45

## 2022-09-11 RX ADMIN — CEPHALEXIN 500 MG: 500 CAPSULE ORAL at 23:10

## 2022-09-11 RX ADMIN — DABIGATRAN ETEXILATE MESYLATE 150 MG: 150 CAPSULE ORAL at 07:49

## 2022-09-11 RX ADMIN — TAMSULOSIN HYDROCHLORIDE 0.4 MG: 0.4 CAPSULE ORAL at 17:41

## 2022-09-11 RX ADMIN — PANTOPRAZOLE SODIUM 40 MG: 40 TABLET, DELAYED RELEASE ORAL at 05:13

## 2022-09-11 RX ADMIN — CEPHALEXIN 500 MG: 500 CAPSULE ORAL at 11:54

## 2022-09-11 RX ADMIN — NYSTATIN 500000 UNITS: 100000 SUSPENSION ORAL at 21:45

## 2022-09-11 NOTE — PROGRESS NOTES
09/11/22 1400   Pain Assessment   Pain Score No Pain   Restrictions/Precautions   Precautions Bed/chair alarms; Fall Risk;Supervision on toilet/commode;Cognitive;Aspiration   Braces or Orthoses AFO  (L LE)   Cognition   Arousal/Participation Cooperative; Alert   Attention Attends with cues to redirect   Memory Decreased short term memory;Decreased recall of precautions   Following Commands Follows one step commands with increased time or repetition   Subjective   Subjective pt  has no new complaints  Would like to go to bathroom at start of session   Sit to Lying   Type of Assistance Needed Physical assistance   Physical Assistance Level 26%-50%   Comment assist for L LE, HOB elevated   Sit to Lying CARE Score 3   Lying to Sitting on Side of Bed   Type of Assistance Needed Supervision   Lying to Sitting on Side of Bed CARE Score 4   Sit to Stand   Type of Assistance Needed Physical assistance   Physical Assistance Level 26%-50%   Comment Pt  needs verbal and assist for L LE placement as it tends to go into extension tone and does not keep it on the floor  Pt  then has a tendecy to retropulse   Sit to Stand CARE Score 3   Bed-Chair Transfer   Type of Assistance Needed Physical assistance   Physical Assistance Level 26%-50%   Comment modified sit pivot  pt  does better when she actually whalen a full stand then cued to step to pivot     Chair/Bed-to-Chair Transfer CARE Score 3   Transfer Bed/Chair/Wheelchair   Adaptive Equipment Roller Walker;None   Findings also trialed RW this session but mostly with sit to stand only   Walk 10 Feet   Type of Assistance Needed Physical assistance   Physical Assistance Level 26%-50%   Comment R HHA/ RW, ,   Walk 10 Feet CARE Score 3   Walk 50 Feet with Two Turns   Type of Assistance Needed Physical assistance   Physical Assistance Level 26%-50%   Comment R HHA/ RW, ,   Walk 50 Feet with Two Turns CARE Score 3   Walk 150 Feet   Type of Assistance Needed Physical assistance   Physical Assistance Level 26%-50%   Comment R HHA/ RW, ,   Walk 150 Feet CARE Score 3   Walking 10 Feet on Uneven Surfaces   Reason if not Attempted Safety concerns   Walking 10 Feet on Uneven Surfaces CARE Score 88   Ambulation   Does the patient walk? 2  Yes   Primary Mode of Locomotion Prior to Admission Walk   Distance Walked (feet) 50 ft  (with R HHA then 150 X 2 with RW)   Assist Device Hand Hold;Roller Walker   Gait Pattern Decreased foot clearance;L foot drag;Improper weight shift;Narrow LUCY; Inconsistant Linda; Step to; Step through   Limitations Noted In Device Management;Balance; Coordination; Endurance   Provided Assistance with: Weight Shift;Balance;Limb Advancement   Walk Assist Level Minimum Assist;Moderate Assist   Findings trialed RW this session to encourage L UE WB and use and as well as for better support and wt  shifting  Wheelchair mobility   Does the patient use a wheelchair? 0  No   Toilet Transfer   Type of Assistance Needed Physical assistance   Physical Assistance Level 26%-50%   Comment using grab bar to oivot from w/c  Pt  needed mod A for toileting task   Toilet Transfer CARE Score 3   Toilet Transfer   Surface Assessed Raised Toilet   Limitations Noted In Balance; Endurance;Problem Solving;LE Strength;UE Strength   Adaptive Equipment Grab Bar   Positioning Concerns Safety   Findings needs A to keep L LE on the floor   Therapeutic Interventions   Strengthening add squeeze with ball, hop abd with red TB (unable to push well with L LE), functional sit to stand X 5 reps focusing on safe techniques with proper hand and foot placement   Flexibility B hamstring and gastroc stretching   L quads over strech in sitting to dec tone   Equipment Use   NuStep Level 1 X 10 mins B UE and LE, pt  takes brief rest breaks as she gets fatigued   Assessment   Treatment Assessment Pt  able to participate in above activities but cont to be limited by L sided weakness and tone needing min to mod A with functional mobility  trialed RW today and pt  demonstrates better balance with the stability the RW provided making it easier to assist pt  with wt  shifting to the R and improving L LE clearance  Pt  did well grasping and using L UE keeping it at all times on RW  Recommend to cont to trial RW to dec burden / assist but also cont with HHA gait trainng to imrpove neuro muscular control and rigting reactions  pt  assisted back to bed at end of session with alarms on and needs in place  Problem List Decreased strength;Decreased range of motion;Decreased endurance; Impaired balance;Decreased mobility; Impaired tone   Barriers to Discharge Inaccessible home environment;Decreased caregiver support   PT Barriers   Functional Limitation Car transfers;Stair negotiation;Standing;Transfers; Walking   Plan   Treatment/Interventions Functional transfer training;LE strengthening/ROM; Elevations; Therapeutic exercise; Endurance training;Patient/family training;Equipment eval/education; Bed mobility;Gait training   Recommendation   PT Discharge Recommendation   (pending d/c to SNF)   Equipment Recommended   (TBD)   PT Therapy Minutes   PT Time In 1400   PT Time Out 1530   PT Total Time (minutes) 90   PT Mode of treatment - Individual (minutes) 90   PT Mode of treatment - Concurrent (minutes) 0   PT Mode of treatment - Group (minutes) 0   PT Mode of treatment - Co-treat (minutes) 0   PT Mode of Treatment - Total time(minutes) 90 minutes   PT Cumulative Minutes 1825   Therapy Time missed   Time missed?  No

## 2022-09-11 NOTE — PROGRESS NOTES
Internal Medicine Progress Note  Patient: Wisam Tavera  Age/sex: 80 y o  female  Medical Record #: [id number]      ASSESSMENT/PLAN: (Interval History)  Wisam Tavera is seen and examined and management for following issues:    Acute embolic right MCA CVA  · Had near occlusion of right MCA  · Felt to be a Xarelto failure and was switched to Pradaxa  · ECHO w/o thrombus  · Continue statin  · Baclofen discontinued  · On 9/2, 9/5 and 9/6 states she is saying a specific word that means nothing  She will repeat a particular word that is not really a word and she knows it  Roni Ferrara speaks fluently about it at the same time  MARIA L MORALES  [facility] neuro exam has been unchanged   Dr Elizabeth Jacobson d/w Neuro on 9/5 and they felt sx don't seem to be neurological = med related?   EEG normal  Head CT negative for new findings  Neurology suspects the episodes are not neurologic in nature and does not recommend any further neurologic work-up      Chronic atrial fibrillation  · Sees Dr Alma Weller as OP  · Rates controlled  · Cont Pradaxa/Bystolic     PPM  · VVI  · Is not MRI conditional  · 100% paced on EKG     Valvular disease  · Current ECHO:  LVEF 65%, mod AR/mild AS, mod TR with severely elevated RV systolic pressure, mild-mod MR, mod LAE/mild NEO  · Euvolemic currently     HTN  · Home:  Verapamil 240mg qhs/Cozaar 090 mg qd/Bystolic 5mg qd/lasix 89IZ qd/Aldactone 25mg qd  · Here:  Verapamil 240mg qhs/Cozaar 74WO qd/Bystolic 5mg qd  · OP note states has left RA stenosis but no testing in OP records or Care Everywhere to verify  · BP was getting a bit soft at times so starting 9/6, reduced Cozaar to 50mg qd and has improved  · No changes today     HLD  · Home:  Crestor 10mg qd = Neuro rec when discharged to reduce to 5mg qd  · Here:  Pravachol 40mg qd     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  · S/p NS 1 liter   · Back to baseline = as OP GFR 50's to 60's    GFR here 40's to 60's  · Continue to hold Lasix and Aldactone for now     CAD  · Nonobstructive  · Card cath 8/2021 done for borderline abn stress test and found 50% mid RCA/40% mid LAD = no intervention done  · Continue statin  · Was not on ASA as an OP     Hypomagnesemia  · Cont 400 mg MagOx  · stable     Left chest pain  · Had left neck pain (not new for her) then pain under left breast 8/31/22 = reproducible with palpation  · Cardiac w/u negative  · Etio was her left arm spasticity   · 2/2 developing odd behaviors over her time here in [facility] so Baclofen reduced and then discontinued      Cough   · CXR negative 8/25  · Has scheduled Tessalon perles 200mg TID/Chlorarseptic spray/pepcid/Mucinex  · Started z-pack 9/2 in case has tracheobronchitis which she had a x3 days and stopped when Keflex started by PMR for asymp bacteruria   · Flonase was not effective  · Cozaar likely not the cause since has been on for a long time  · Says feels like something in throat and constantly spits out her saliva = per ST has no previous issues eating/drinking but since no improvement --> D/W Dr Goodman Courser and ST --> s/p VBS 9/6 w/o acute findings  · May have some thrush as below but overall likely behavioral     Possible thrush  · On tongue may have some mild thrush  · If so, then could have some esophagitis from it as well  · Continue empiric Mycostatin      Urine retention  · Improved  · Management per PMR  · Flomax added on 8/31/22  · Urine culture is >60,000-69,000 proteus = no sx but PMR tx'ing with Keflex empirically x 7 days     Vaginal yeast infection  · S/p Monistat pack/Diflucan 150mg x 1         DC planning: DC pending SNF bed        The above assessment and plan was reviewed and updated as determined by my evaluation of the patient on 9/11/2022      Labs:   Results from last 7 days   Lab Units 09/07/22  1507 09/05/22  0428   WBC Thousand/uL 7 16 5 88   HEMOGLOBIN g/dL 11 5 10 7*   HEMATOCRIT % 35 0 34 5*   PLATELETS Thousands/uL 237 227     Results from last 7 days   Lab Units 09/07/22  1507 09/05/22  0428   SODIUM mmol/L 136 137 POTASSIUM mmol/L 4 1 4 0   CHLORIDE mmol/L 106 107   CO2 mmol/L 23 24   BUN mg/dL 17 29*   CREATININE mg/dL 0 92 1 12   CALCIUM mg/dL 9 4 9 2             Results from last 7 days   Lab Units 09/09/22  0600   POC GLUCOSE mg/dl 91       Review of Scheduled Meds:  Current Facility-Administered Medications   Medication Dose Route Frequency Provider Last Rate    acetaminophen  650 mg Oral Q6H PRN Clifton Senior MD      benzonatate  200 mg Oral TID TOMY Ley      bisacodyl  10 mg Rectal Daily PRN Clifton Senior MD      cephalexin  500 mg Oral Q6H [address] Clifton Senior MD      dabigatran etexilate  150 mg Oral Q12H [address] Clifton Senior MD      guaiFENesin  600 mg Oral Q12H [address] Geoffrey Dragon [place], CRNP      losartan  50 mg Oral Daily Geoffrey Dragon TOMY Piper      magnesium oxide  400 mg Oral Daily Clifton Senior MD      melatonin  6 mg Oral QPM Oral Colonel, DO      menthol-methyl salicylate   Apply externally BID Caryn Campoverde MD      nebivolol  5 mg Oral Daily Clifton Senior MD      nitroglycerin  0 4 mg Sublingual Q5 Min PRN TOMY Ley      nystatin  500,000 Units Swish & Swallow 4x Daily Egoffrey Steven GranadostoTOMY herrera      pantoprazole  40 mg Oral Early Morning Clifton Senior MD      phenol  1 spray Mouth/Throat Q2H PRN Caryn Campoverde MD      polyethylene glycol  17 g Oral Daily PRN Clifton Senior MD      pravastatin  40 mg Oral Daily With Dayanara Verma MD      tamsulosin  0 4 mg Oral Daily With Lexy Petty MD      verapamil  240 mg Oral HS Clifton Senior MD         Subjective/ HPI: Patient seen and examined  Patients overnight issues or events were reviewed with nursing or staff during rounds or morning huddle session  New or overnight issues include the following:     Pt seen in therapy  She states she is doing well  She denies any current complaints  ROS:   A 10 point ROS was performed; negative except as noted above         Imaging: CT head wo contrast   Final Result by Aleta Lal DO (09/07 2016)   Mildly improved gray-white matter differentiation right MCA territory compared to August 15  No hemorrhagic conversion, laminar necrosis, or mass lesion  Workstation performed: [id number]         FL barium swallow video w speech   Final Result by ARTEMIO GONZALEZ (09/06 1563)      XR chest portable   Final Result by Nanda Moran MD (26/16 9713)      No acute cardiopulmonary disease  Workstation performed: [id number]             *Labs /Radiology studies reviewed  *Medications reviewed and reconciled as needed  *Please refer to order section for additional ordered labs studies  *Case discussed with primary attending during morning huddle case rounds    Physical Examination:  Vitals:   Vitals:    09/10/22 1414 09/10/22 2114 09/10/22 2123 09/11/22 0607   BP: 129/51 150/60 150/60 107/54   BP Location: Right arm Left arm  Left arm   Pulse: 61 82  79   Resp: 18 17  18   Temp: 97 8 °F (36 6 °C) 98 2 °F (36 8 °C)  97 7 °F (36 5 °C)   TempSrc: Oral Oral  Oral   SpO2: 98% 98%  98%   Weight:       Height:         General Appearance: no distress, conversive  HEENT: PERRLA, conjuctiva normal; oropharynx clear; mucous membranes moist   Neck:  Supple, normal ROM  Lungs: CTA, normal respiratory effort, no retractions, expiratory effort normal  CV: regular rate and rhythm; no rubs/murmurs/gallops, PMI normal   ABD: soft; ND/NT; +BS  EXT: no edema  Skin: normal turgor, normal texture, no rashes  Psych: affect normal, mood normal  Neuro: AA      The above physical exam was reviewed and updated as determined by my evaluation of the patient on 9/11/2022  Invasive Devices  Report    None                    VTE Pharmacologic Prophylaxis: Pradaxa  Code Status: Level 1 - Full Code  Current Length of Stay: 23 day(s)      Total time spent:  30 minutes with more than 50% spent counseling/coordinating care  Counseling includes discussion with patient re: progress  and discussion with patient of his/her current medical state/information  Coordination of patient's care was performed in conjunction with primary service  Time invested included review of patient's labs, vitals, and management of their comorbidities with continued monitoring  In addition, this patient was discussed with medical team including physician and advanced extenders  The care of the patient was extensively discussed and appropriate treatment plan was formulated unique for this patient  ** Please Note:  voice to text software may have been used in the creation of this document   Although proof errors in transcription or interpretation are a potential of such software**

## 2022-09-11 NOTE — PROGRESS NOTES
OT Daily Treatment Note       09/11/22 1005   Pain Assessment   Pain Assessment Tool 0-10   Pain Score No Pain   Restrictions/Precautions   Precautions Aspiration;Bed/chair alarms;Cognitive; Fall Risk;Supervision on toilet/commode   Lifestyle   Autonomy "Can we please get washed and dressed before the gym"   Shower/Bathe Self   Type of Assistance Needed Physical assistance   Physical Assistance Level 25% or less   Comment sponge bath seated in WC as per pt request  pt req A for bathing of buttock in stance with bedrail as UE support  pt able to bathe all other body parts including feet while seated  Shower/Bathe Self CARE Score 3   Bathing   Assessed Bath Style Sponge Bath   Anticipated D/C Bath Style Shower;Sponge Bath   Able to [place] No   Able to [employer] Temperature No   Able to Wash/Rinse/Dry (body part) Left Arm;Right Arm;L Upper Leg;R Upper Leg;L Lower Leg/Foot;R Lower Leg/Foot;Chest;Abdomen;Perineal Area   Limitations Noted in Balance; Coordination;Strength   Positioning Seated;Standing   Upper Body Dressing   Type of Assistance Needed Set-up / clean-up   Physical Assistance Level No physical assistance   Comment seated in WC   Upper Body Dressing CARE Score 5   Lower Body Dressing   Type of Assistance Needed Physical assistance   Physical Assistance Level 51%-75%   Comment mod A x 1, pt able to thread over BL feet, req min A in stance to don over hips   Lower Body Dressing CARE Score 2   Putting On/Taking Off Footwear   Type of Assistance Needed Physical assistance   Physical Assistance Level 25% or less   Comment Min A to don L shoe   able to don/doff BL socks   Putting On/Taking Off Footwear CARE Score 3   Sit to Lying   Type of Assistance Needed Physical assistance   Physical Assistance Level 26%-50%   Comment Min A to elevate L LE onto bed   Sit to Lying CARE Score 3   Lying to Sitting on Side of Bed   Type of Assistance Needed Physical assistance   Physical Assistance Level 25% or less Comment Min A for UE support   Lying to Sitting on Side of Bed CARE Score 3   Sit to Stand   Type of Assistance Needed Incidental touching   Physical Assistance Level No physical assistance   Comment CGA with BL UE support on bedrail   Sit to Stand CARE Score 4   Bed-Chair Transfer   Type of Assistance Needed Physical assistance   Physical Assistance Level 25% or less   Comment Min A sit pivot to R   Chair/Bed-to-Chair Transfer CARE Score 3   Toileting Hygiene   Type of Assistance Needed Physical assistance   Physical Assistance Level 25% or less   Comment Min A for CM in stance while pt maintained stance with grab bats   [address] Score 3   Toilet Transfer   Type of Assistance Needed Physical assistance   Physical Assistance Level 25% or less   Comment Min A stand pivot from WC > toilet with BL UE on grab bars   Toilet Transfer CARE Score 3   Functional Standing Tolerance   Time 3 x 4 minutes   Activity dynamic standing balance/tolerance   Comments supported standing tasks at tabletop with LLE knee block and CGA completing parquetry puzzles x 2 and laundry folding x 2  goal was to remain unsupported throughout while ensuring even weight distribution between BL LE  pt req 1 rest break during laundry folding task 2* fatigue  Exercise Tools   Exercise Tools Yes   UE Ergometer completed 5 min UE ergometer focusing on increasing functional activity tolerance and UB strength to increase independence with ADL completion and functional transfers  Pt req 1x rest break after 3 minutes for fatigue mgmt  educated pt on pursed lip breathing   Coordination   Fine Motor completed LUE NMR using resistive clothes pin with focus on pincer grasp, placing pegs into holes with 100% accuracy, placing small coins into jar and finding 6 beads in medium green therapuuty   Pt req S/u of tasks and min vc to use fine motor grasps instead of compensating with gross motor grasps   Cognition   Overall Cognitive Status Impaired Arousal/Participation Alert; Cooperative   Attention Attends with cues to redirect   Orientation Level Oriented X4   Memory Decreased short term memory;Decreased recall of recent events   Following Commands Follows one step commands with increased time or repetition   Activity Tolerance   Activity Tolerance Patient tolerated treatment well   Assessment   Treatment Assessment Pt participated in skilled OT session with focus on increasing independence with ADL completion, FMS/FMC, UE strength, functional transfer training and dynamic standing balance/tolerance  Pt was found seated upright in bed  VS were taken and were WNL  See flowsheet for details regarding session  Pt is limited by LUE Tone, coordination, strength, dynamic standing balance and tolerance and requires skilled OT services to increase independence with ADL completion with goal of HERBERT  Prognosis Fair   Problem List Decreased strength;Decreased endurance;Decreased mobility; Impaired balance;Decreased coordination; Impaired judgement;Decreased safety awareness;Decreased cognition; Impaired tone   Barriers to Discharge Inaccessible home environment;Decreased caregiver support   Plan   Treatment/Interventions ADL retraining;Functional transfer training; Therapeutic exercise; Endurance training;Cognitive reorientation;Equipment eval/education;Patient/family training; Compensatory technique education   Progress Progressing toward goals   Recommendation   OT Discharge Recommendation Post acute rehabilitation services   OT Therapy Minutes   OT Time In 1005   OT Time Out 1135   OT Total Time (minutes) 90   OT Mode of treatment - Individual (minutes) 90   OT Mode of treatment - Concurrent (minutes) 0   OT Mode of treatment - Group (minutes) 0   OT Mode of treatment - Co-treat (minutes) 0   OT Mode of Treatment - Total time(minutes) 90 minutes   OT Cumulative Minutes 7843   Therapy Time missed   Time missed?  No

## 2022-09-11 NOTE — PROGRESS NOTES
09/11/22 0940   Pain Assessment   Pain Assessment Tool 0-10   Pain Score No Pain   Restrictions/Precautions   Precautions Aspiration;Bed/chair alarms;Cognitive; Fall Risk;Supervision on toilet/commode   Weight Bearing Restrictions No   ROM Restrictions No   Comprehension   Comprehension (FIM) 5 - Needs slowed speech to understand   Expression   Expression (FIM) 5 - Needs help/cues only RARELY (< 10% of the time)   Social Interaction   Social Interaction (FIM) 5 - Interacts appropriately with others 90% of time   Problem Solving   Problem solving (FIM) 4 - Solves basic problems 75-89% of time   Memory   Memory (FIM) 4 - Recognizes/recalls/performs 75-89%   Speech/Language/Cognition Assessmetn   Treatment Assessment Pt seen for brief skilled speech therapy session targeting cognitive linguistic communication skills  Pt alert and interactive- asking to get washed and educated on OT session following SLP session who will assist her with that  Pt completed the following skilled therapy tasks- word sort with putting list of words into proper categories Fx6 (concrete)  Noted worksheet completed in Indonesian to assist with increased comprehension which pt appeared to complete cognitive portions with sorting with 30/30acc  Pt wanting to write the words herself in the boxes, using weaker Right hand which is her dominant- poor script and more than 50% illegible  Pt stating though "this is the first I've written in a while"  Discharge plan is to SNF for continued subacute rehab as pt requires more time for progress prior to safe discharge home  Pt will cont to benefit from skilled SLP services in subacute placement to maximize overall cognitive linguistic communication abilities at this time     SLP Therapy Minutes   SLP Time In 56   SLP Time Out 1000   SLP Total Time (minutes) 20   SLP Mode of treatment - Individual (minutes) 20   SLP Mode of treatment - Concurrent (minutes) 0   SLP Mode of treatment - Group (minutes) 0   SLP Mode of treatment - Co-treat (minutes) 0   SLP Mode of Treatment - Total time(minutes) 20 minutes   SLP Cumulative Minutes 535   Therapy Time missed   Time missed?  No

## 2022-09-11 NOTE — PLAN OF CARE
Problem: MOBILITY - ADULT  Goal: Maintain or return to baseline ADL function  Description: INTERVENTIONS:  -  Assess patient's ability to carry out ADLs; assess patient's baseline for ADL function and identify physical deficits which impact ability to perform ADLs (bathing, care of mouth/teeth, toileting, grooming, dressing, etc )  - Assess/evaluate cause of self-care deficits   - Assess range of motion  - Assess patient's mobility; develop plan if impaired  - Assess patient's need for assistive devices and provide as appropriate  - Encourage maximum independence but intervene and supervise when necessary  - Involve family in performance of ADLs  - Assess for home care needs following discharge   - Consider OT consult to assist with ADL evaluation and planning for discharge  - Provide patient education as appropriate  Outcome: Progressing  Goal: Maintains/Returns to pre admission functional level  Description: INTERVENTIONS:  - Set and communicate daily mobility goal to care team and patient/family/caregiver  - Collaborate with rehabilitation services on mobility goals if consulted  - Perform Range of Motion 3 times a day  - Reposition patient every 2 hours    - Dangle patient 3 times a day  - Stand patient 3 times a day  - Ambulate patient 3 times a day  - Out of bed to chair 3 times a day   - Out of bed for meals 3 times a day  - Out of bed for toileting  - Record patient progress and toleration of activity level   Outcome: Progressing     Problem: Prexisting or High Potential for Compromised Skin Integrity  Goal: Skin integrity is maintained or improved  Description: INTERVENTIONS:  - Identify patients at risk for skin breakdown  - Assess and monitor skin integrity  - Assess and monitor nutrition and hydration status  - Monitor labs   - Assess for incontinence   - Turn and reposition patient  - Assist with mobility/ambulation  - Relieve pressure over bony prominences  - Avoid friction and shearing  - Provide appropriate hygiene as needed including keeping skin clean and dry  - Evaluate need for skin moisturizer/barrier cream  - Collaborate with interdisciplinary team   - Patient/family teaching  - Consider wound care consult   Outcome: Progressing     Problem: Potential for Falls  Goal: Patient will remain free of falls  Description: INTERVENTIONS:  - Educate patient/family on patient safety including physical limitations  - Instruct patient to call for assistance with activity   - Consult OT/PT to assist with strengthening/mobility   - Keep Call bell within reach  - Keep bed low and locked with side rails adjusted as appropriate  - Keep care items and personal belongings within reach  - Initiate and maintain comfort rounds  - Make Fall Risk Sign visible to staff  - Offer Toileting every 2-4 Hours, in advance of need  - Initiate/Maintain bed/chair alarm  - Obtain necessary fall risk management equipment: nonskid footwear  - Apply yellow socks and bracelet for high fall risk patients  - Consider moving patient to room near nurses station  Outcome: Progressing     Problem: PAIN - ADULT  Goal: Verbalizes/displays adequate comfort level or baseline comfort level  Description: Interventions:  - Encourage patient to monitor pain and request assistance  - Assess pain using appropriate pain scale  - Administer analgesics based on type and severity of pain and evaluate response  - Implement non-pharmacological measures as appropriate and evaluate response  - Consider cultural and social influences on pain and pain management  - Notify physician/advanced practitioner if interventions unsuccessful or patient reports new pain  Outcome: Progressing     Problem: INFECTION - ADULT  Goal: Absence or prevention of progression during hospitalization  Description: INTERVENTIONS:  - Assess and monitor for signs and symptoms of infection  - Monitor lab/diagnostic results  - Monitor all insertion sites, i e  indwelling lines, tubes, and drains  - Monitor endotracheal if appropriate and nasal secretions for changes in amount and color  - [place] appropriate cooling/warming therapies per order  - Administer medications as ordered  - Instruct and encourage patient and family to use good hand hygiene technique  - Identify and instruct in appropriate isolation precautions for identified infection/condition  Outcome: Progressing     Problem: DISCHARGE PLANNING  Goal: Discharge to home or other facility with appropriate resources  Description: INTERVENTIONS:  - Identify barriers to discharge w/patient and caregiver  - Arrange for needed discharge resources and transportation as appropriate  - Identify discharge learning needs (meds, wound care, etc )  - Arrange for interpretive services to assist at discharge as needed  - Refer to Case Management Department for coordinating discharge planning if the patient needs post-hospital services based on physician/advanced practitioner order or complex needs related to functional status, cognitive ability, or social support system  Outcome: Progressing     Problem: Nutrition/Hydration-ADULT  Goal: Nutrient/Hydration intake appropriate for improving, restoring or maintaining nutritional needs  Description: Monitor and assess patient's nutrition/hydration status for malnutrition  Collaborate with interdisciplinary team and initiate plan and interventions as ordered  Monitor patient's weight and dietary intake as ordered or per policy  Utilize nutrition screening tool and intervene as necessary  Determine patient's food preferences and provide high-protein, high-caloric foods as appropriate       INTERVENTIONS:  - Monitor oral intake, urinary output, labs, and treatment plans  - Assess nutrition and hydration status and recommend course of action  - Evaluate amount of meals eaten  - Assist patient with eating if necessary   - Allow adequate time for meals  - Recommend/ encourage appropriate diets, oral nutritional supplements, and vitamin/mineral supplements  - Order, calculate, and assess calorie counts as needed  - Recommend, monitor, and adjust tube feedings and TPN/PPN based on assessed needs  - Assess need for intravenous fluids  - Provide specific nutrition/hydration education as appropriate  - Include patient/family/caregiver in decisions related to nutrition  Outcome: Progressing

## 2022-09-11 NOTE — PLAN OF CARE
Problem: MOBILITY - ADULT  Goal: Maintain or return to baseline ADL function  Description: INTERVENTIONS:  -  Assess patient's ability to carry out ADLs; assess patient's baseline for ADL function and identify physical deficits which impact ability to perform ADLs (bathing, care of mouth/teeth, toileting, grooming, dressing, etc )  - Assess/evaluate cause of self-care deficits   - Assess range of motion  - Assess patient's mobility; develop plan if impaired  - Assess patient's need for assistive devices and provide as appropriate  - Encourage maximum independence but intervene and supervise when necessary  - Involve family in performance of ADLs  - Assess for home care needs following discharge   - Consider OT consult to assist with ADL evaluation and planning for discharge  - Provide patient education as appropriate  Outcome: Progressing  Goal: Maintains/Returns to pre admission functional level  Description: INTERVENTIONS:  - Set and communicate daily mobility goal to care team and patient/family/caregiver  - Collaborate with rehabilitation services on mobility goals if consulted  - Perform Range of Motion 3 times a day  - Reposition patient every 2 hours    - Dangle patient 3 times a day  - Stand patient 3 times a day  - Ambulate patient 3 times a day  - Out of bed to chair 3 times a day   - Out of bed for meals 3 times a day  - Out of bed for toileting  - Record patient progress and toleration of activity level   Outcome: Progressing     Problem: Prexisting or High Potential for Compromised Skin Integrity  Goal: Skin integrity is maintained or improved  Description: INTERVENTIONS:  - Identify patients at risk for skin breakdown  - Assess and monitor skin integrity  - Assess and monitor nutrition and hydration status  - Monitor labs   - Assess for incontinence   - Turn and reposition patient  - Assist with mobility/ambulation  - Relieve pressure over bony prominences  - Avoid friction and shearing  - Provide appropriate hygiene as needed including keeping skin clean and dry  - Evaluate need for skin moisturizer/barrier cream  - Collaborate with interdisciplinary team   - Patient/family teaching  - Consider wound care consult   Outcome: Progressing     Problem: Potential for Falls  Goal: Patient will remain free of falls  Description: INTERVENTIONS:  - Educate patient/family on patient safety including physical limitations  - Instruct patient to call for assistance with activity   - Consult OT/PT to assist with strengthening/mobility   - Keep Call bell within reach  - Keep bed low and locked with side rails adjusted as appropriate  - Keep care items and personal belongings within reach  - Initiate and maintain comfort rounds  - Make Fall Risk Sign visible to staff  - Offer Toileting every 2-4 Hours, in advance of need  - Initiate/Maintain bed/chair alarm  - Obtain necessary fall risk management equipment: nonskid footwear  - Apply yellow socks and bracelet for high fall risk patients  - Consider moving patient to room near nurses station  Outcome: Progressing     Problem: PAIN - ADULT  Goal: Verbalizes/displays adequate comfort level or baseline comfort level  Description: Interventions:  - Encourage patient to monitor pain and request assistance  - Assess pain using appropriate pain scale  - Administer analgesics based on type and severity of pain and evaluate response  - Implement non-pharmacological measures as appropriate and evaluate response  - Consider cultural and social influences on pain and pain management  - Notify physician/advanced practitioner if interventions unsuccessful or patient reports new pain  Outcome: Progressing     Problem: INFECTION - ADULT  Goal: Absence or prevention of progression during hospitalization  Description: INTERVENTIONS:  - Assess and monitor for signs and symptoms of infection  - Monitor lab/diagnostic results  - Monitor all insertion sites, i e  indwelling lines, tubes, and drains  - Monitor endotracheal if appropriate and nasal secretions for changes in amount and color  - [place] appropriate cooling/warming therapies per order  - Administer medications as ordered  - Instruct and encourage patient and family to use good hand hygiene technique  - Identify and instruct in appropriate isolation precautions for identified infection/condition  Outcome: Progressing     Problem: DISCHARGE PLANNING  Goal: Discharge to home or other facility with appropriate resources  Description: INTERVENTIONS:  - Identify barriers to discharge w/patient and caregiver  - Arrange for needed discharge resources and transportation as appropriate  - Identify discharge learning needs (meds, wound care, etc )  - Arrange for interpretive services to assist at discharge as needed  - Refer to Case Management Department for coordinating discharge planning if the patient needs post-hospital services based on physician/advanced practitioner order or complex needs related to functional status, cognitive ability, or social support system  Outcome: Progressing     Problem: Nutrition/Hydration-ADULT  Goal: Nutrient/Hydration intake appropriate for improving, restoring or maintaining nutritional needs  Description: Monitor and assess patient's nutrition/hydration status for malnutrition  Collaborate with interdisciplinary team and initiate plan and interventions as ordered  Monitor patient's weight and dietary intake as ordered or per policy  Utilize nutrition screening tool and intervene as necessary  Determine patient's food preferences and provide high-protein, high-caloric foods as appropriate       INTERVENTIONS:  - Monitor oral intake, urinary output, labs, and treatment plans  - Assess nutrition and hydration status and recommend course of action  - Evaluate amount of meals eaten  - Assist patient with eating if necessary   - Allow adequate time for meals  - Recommend/ encourage appropriate diets, oral nutritional supplements, and vitamin/mineral supplements  - Order, calculate, and assess calorie counts as needed  - Recommend, monitor, and adjust tube feedings and TPN/PPN based on assessed needs  - Assess need for intravenous fluids  - Provide specific nutrition/hydration education as appropriate  - Include patient/family/caregiver in decisions related to nutrition  Outcome: Progressing

## 2022-09-12 LAB
ANION GAP SERPL CALCULATED.3IONS-SCNC: 4 MMOL/L (ref 4–13)
BASOPHILS # BLD AUTO: 0.03 THOUSANDS/ΜL (ref 0–0.1)
BASOPHILS NFR BLD AUTO: 1 % (ref 0–1)
BUN SERPL-MCNC: 18 MG/DL (ref 5–25)
CALCIUM SERPL-MCNC: 8.7 MG/DL (ref 8.3–10.1)
CHLORIDE SERPL-SCNC: 109 MMOL/L (ref 96–108)
CO2 SERPL-SCNC: 25 MMOL/L (ref 21–32)
CREAT SERPL-MCNC: 0.92 MG/DL (ref 0.6–1.3)
EOSINOPHIL # BLD AUTO: 0.08 THOUSAND/ΜL (ref 0–0.61)
EOSINOPHIL NFR BLD AUTO: 1 % (ref 0–6)
ERYTHROCYTE [DISTWIDTH] IN BLOOD BY AUTOMATED COUNT: 14.2 % (ref 11.6–15.1)
GFR SERPL CREATININE-BSD FRML MDRD: 58 ML/MIN/1.73SQ M
GLUCOSE P FAST SERPL-MCNC: 86 MG/DL (ref 65–99)
GLUCOSE SERPL-MCNC: 86 MG/DL (ref 65–140)
HCT VFR BLD AUTO: 32.4 % (ref 34.8–46.1)
HGB BLD-MCNC: 10.4 G/DL (ref 11.5–15.4)
IMM GRANULOCYTES # BLD AUTO: 0.02 THOUSAND/UL (ref 0–0.2)
IMM GRANULOCYTES NFR BLD AUTO: 0 % (ref 0–2)
LYMPHOCYTES # BLD AUTO: 1.08 THOUSANDS/ΜL (ref 0.6–4.47)
LYMPHOCYTES NFR BLD AUTO: 16 % (ref 14–44)
MCH RBC QN AUTO: 30.1 PG (ref 26.8–34.3)
MCHC RBC AUTO-ENTMCNC: 32.1 G/DL (ref 31.4–37.4)
MCV RBC AUTO: 94 FL (ref 82–98)
MONOCYTES # BLD AUTO: 0.68 THOUSAND/ΜL (ref 0.17–1.22)
MONOCYTES NFR BLD AUTO: 10 % (ref 4–12)
NEUTROPHILS # BLD AUTO: 4.68 THOUSANDS/ΜL (ref 1.85–7.62)
NEUTS SEG NFR BLD AUTO: 72 % (ref 43–75)
NRBC BLD AUTO-RTO: 0 /100 WBCS
PLATELET # BLD AUTO: 182 THOUSANDS/UL (ref 149–390)
PMV BLD AUTO: 10.8 FL (ref 8.9–12.7)
POTASSIUM SERPL-SCNC: 3.9 MMOL/L (ref 3.5–5.3)
RBC # BLD AUTO: 3.45 MILLION/UL (ref 3.81–5.12)
SODIUM SERPL-SCNC: 138 MMOL/L (ref 135–147)
WBC # BLD AUTO: 6.57 THOUSAND/UL (ref 4.31–10.16)

## 2022-09-12 PROCEDURE — 97112 NEUROMUSCULAR REEDUCATION: CPT

## 2022-09-12 PROCEDURE — 97116 GAIT TRAINING THERAPY: CPT

## 2022-09-12 PROCEDURE — 99232 SBSQ HOSP IP/OBS MODERATE 35: CPT | Performed by: PHYSICAL MEDICINE & REHABILITATION

## 2022-09-12 PROCEDURE — 99232 SBSQ HOSP IP/OBS MODERATE 35: CPT | Performed by: INTERNAL MEDICINE

## 2022-09-12 PROCEDURE — 80048 BASIC METABOLIC PNL TOTAL CA: CPT | Performed by: NURSE PRACTITIONER

## 2022-09-12 PROCEDURE — 97530 THERAPEUTIC ACTIVITIES: CPT

## 2022-09-12 PROCEDURE — 85025 COMPLETE CBC W/AUTO DIFF WBC: CPT | Performed by: NURSE PRACTITIONER

## 2022-09-12 RX ORDER — BENZONATATE 100 MG/1
100 CAPSULE ORAL 3 TIMES DAILY
Status: DISCONTINUED | OUTPATIENT
Start: 2022-09-12 | End: 2022-09-13 | Stop reason: HOSPADM

## 2022-09-12 RX ADMIN — CEPHALEXIN 500 MG: 500 CAPSULE ORAL at 12:02

## 2022-09-12 RX ADMIN — BENZONATATE 200 MG: 100 CAPSULE ORAL at 08:49

## 2022-09-12 RX ADMIN — BENZONATATE 100 MG: 100 CAPSULE ORAL at 20:21

## 2022-09-12 RX ADMIN — DABIGATRAN ETEXILATE MESYLATE 150 MG: 150 CAPSULE ORAL at 20:22

## 2022-09-12 RX ADMIN — NYSTATIN 500000 UNITS: 100000 SUSPENSION ORAL at 08:48

## 2022-09-12 RX ADMIN — NYSTATIN 500000 UNITS: 100000 SUSPENSION ORAL at 12:02

## 2022-09-12 RX ADMIN — MAGNESIUM OXIDE TAB 400 MG (241.3 MG ELEMENTAL MG) 400 MG: 400 (241.3 MG) TAB at 08:49

## 2022-09-12 RX ADMIN — GUAIFENESIN 600 MG: 600 TABLET, EXTENDED RELEASE ORAL at 20:21

## 2022-09-12 RX ADMIN — MENTHOL, METHYL SALICYLATE 1 APPLICATION: 10; 15 CREAM TOPICAL at 17:36

## 2022-09-12 RX ADMIN — DABIGATRAN ETEXILATE MESYLATE 150 MG: 150 CAPSULE ORAL at 08:49

## 2022-09-12 RX ADMIN — MENTHOL, METHYL SALICYLATE: 10; 15 CREAM TOPICAL at 08:51

## 2022-09-12 RX ADMIN — PANTOPRAZOLE SODIUM 40 MG: 40 TABLET, DELAYED RELEASE ORAL at 05:26

## 2022-09-12 RX ADMIN — NEBIVOLOL 5 MG: 5 TABLET ORAL at 08:50

## 2022-09-12 RX ADMIN — VERAPAMIL HYDROCHLORIDE 240 MG: 240 TABLET ORAL at 21:03

## 2022-09-12 RX ADMIN — NYSTATIN 500000 UNITS: 100000 SUSPENSION ORAL at 17:34

## 2022-09-12 RX ADMIN — NYSTATIN 500000 UNITS: 100000 SUSPENSION ORAL at 21:03

## 2022-09-12 RX ADMIN — GUAIFENESIN 600 MG: 600 TABLET, EXTENDED RELEASE ORAL at 08:49

## 2022-09-12 RX ADMIN — PRAVASTATIN SODIUM 40 MG: 40 TABLET ORAL at 17:34

## 2022-09-12 RX ADMIN — Medication 6 MG: at 20:21

## 2022-09-12 RX ADMIN — CEPHALEXIN 500 MG: 500 CAPSULE ORAL at 05:26

## 2022-09-12 RX ADMIN — TAMSULOSIN HYDROCHLORIDE 0.4 MG: 0.4 CAPSULE ORAL at 17:34

## 2022-09-12 RX ADMIN — POLYETHYLENE GLYCOL 3350 17 G: 17 POWDER, FOR SOLUTION ORAL at 09:17

## 2022-09-12 RX ADMIN — LOSARTAN POTASSIUM 50 MG: 50 TABLET, FILM COATED ORAL at 08:49

## 2022-09-12 RX ADMIN — BENZONATATE 100 MG: 100 CAPSULE ORAL at 17:36

## 2022-09-12 NOTE — PROGRESS NOTES
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  · Continue PT/OT/SLP  · Will have bed at [facility]  · Got Covid Booster on 9/9  · Confirmed with CM no need for repeat testing  Last completed 9/9  · Transient Neuro Symptoms  · Negative work-up   · Spastic L hemiparesis  · Baclofen has been weaned off  · Suspect it was exacerbated by bladder issues  · Improved  · Adjustment disorder  · Psych support  · Neck Pain  · Myofascial, spasticity  · Bengay helpful  · Urinary Retention  · Continuing to monitor  Timed void with scan/cath Q4hr  · Cough  · Improved  IM adjusting cough medication today  · Thrush  · Nystatin  · CAD  · Statin, Pradaxa  · Outpatient Cards f/u  · Afib  · Verapamil, Nebivolol, Pradaxa  · IM following  · CKD3  · Stable  Monitor  · HLD  · Stable monitor  · HTN  · Continue   · DVT PPx: Fully anticoagulated  Total visit time: 25 minutes, with more than 50% spent counseling/coordinating care  Counseling includes discussion with patient re: progress in therapies, functional issues observed by therapy staff, and discussion with patient regarding their current medical state and wellbeing  Coordination of patient's care was performed in conjunction with Internal Medicine service to monitor patient's labs, vitals, and management of their comorbidities  Kianna Valverde MD  Physical Medicine and Rehabilitation      Review of Systems: A 10 point review of systems was negative except for what is noted in the HPI          Current Facility-Administered Medications:     acetaminophen (TYLENOL) tablet 650 mg, 650 mg, Oral, Q6H PRN, Diana Amezcua MD, 650 mg at 09/09/22 0207    benzonatate (TESSALON PERLES) capsule 100 mg, 100 mg, Oral, TID, TOMY Ramirez    bisacodyl (DULCOLAX) rectal suppository 10 mg, 10 mg, Rectal, Daily PRN, Diana Amezcua MD, 10 mg at 09/03/22 1818    dabigatran etexilate (PRADAXA) capsule 150 mg, 150 mg, Oral, Q12H [address], Diana Amezcua MD, 150 mg at 09/12/22 0849    guaiFENesin (MUCINEX) 12 hr tablet 600 mg, 600 mg, Oral, Q12H [address], TOMY Ramirez, 600 mg at 09/12/22 0849    losartan (COZAAR) tablet 50 mg, 50 mg, Oral, Daily, TOMY Ramirez, 50 mg at 09/12/22 0849    magnesium oxide (MAG-OX) tablet 400 mg, 400 mg, Oral, Daily, Diana Amezcua MD, 400 mg at 09/12/22 0849    melatonin tablet 6 mg, 6 mg, Oral, QPM, Marjorie Gaines, DO, 6 mg at 09/11/22 2149    menthol-methyl salicylate (BENGAY) 20-35 % cream, , Apply externally, BID, Lucy Patterson MD, Given at 09/12/22 0851    nebivolol (BYSTOLIC) tablet 5 mg, 5 mg, Oral, Daily, Diana Amezcua MD, 5 mg at 09/12/22 0850    nitroglycerin (NITROSTAT) SL tablet 0 4 mg, 0 4 mg, Sublingual, Q5 Min PRN, TOMY Ramirez    nystatin (MYCOSTATIN) oral suspension 500,000 Units, 500,000 Units, Swish & Swallow, 4x Daily, TOMY Ramirez, 500,000 Units at 09/12/22 1202    pantoprazole (PROTONIX) EC tablet 40 mg, 40 mg, Oral, Early Morning, Diana Amezcua MD, 40 mg at 09/12/22 0526    phenol (CHLORASEPTIC) 1 4 % mucosal liquid 1 spray, 1 spray, Mouth/Throat, Q2H PRN, Lucy Patterson MD, 1 spray at 09/03/22 1515    polyethylene glycol (MIRALAX) packet 17 g, 17 g, Oral, Daily PRN, Diana Amezcua MD, 17 g at 09/12/22 0917    pravastatin (PRAVACHOL) tablet 40 mg, 40 mg, Oral, Daily With Abdiaziz Goodwin MD, 40 mg at 09/11/22 1741    tamsulosin (FLOMAX) capsule 0 4 mg, 0 4 mg, Oral, Daily With Ghazala Serum Depadua, MD, 0 4 mg at 09/11/22 1741    verapamil (CALAN-SR) CR tablet 240 mg, 240 mg, Oral, HS, Cristina Briggs MD, 240 mg at 09/11/22 2150    Physical Exam:  Temp:  [97 8 °F (36 6 °C)-98 1 °F (36 7 °C)] 97 9 °F (36 6 °C)  HR:  [59-71] 71  Resp:  [16-18] 17  BP: (106-147)/(53-64) 137/62  SpO2:  [96 %-98 %] 96 %    Gen: No acute distress, Well-nourished, well-appearing  HEENT: Moist mucus membranes, Normocephalic/Atraumatic  Cardiovascular: Regular rate, rhythm, S1/S2  Distal pulses palpable  Heme/Extr: No edema  Pulmonary: Non-labored breathing  : No contreras  GI: Soft, non-tender, non-distended  Integumentary: Skin is warm, dry  Neuro: AAOx3, L spastic hemiplegia  Improved  Psych: Normal mood and affect  Laboratory:  Labs reviewed  Results from last 7 days   Lab Units 09/12/22  0526 09/07/22  1507   HEMOGLOBIN g/dL 10 4* 11 5   HEMATOCRIT % 32 4* 35 0   WBC Thousand/uL 6 57 7 16     Results from last 7 days   Lab Units 09/12/22  0526 09/07/22  1507   BUN mg/dL 18 17   SODIUM mmol/L 138 136   POTASSIUM mmol/L 3 9 4 1   CHLORIDE mmol/L 109* 106   CREATININE mg/dL 0 92 0 92   AST U/L  --  20   ALT U/L  --  21            Diagnostic Studies: Reviewed   CT head wo contrast   Final Result by Aliyah Araiza DO (09/07 2016)   Mildly improved gray-white matter differentiation right MCA territory compared to August 15  No hemorrhagic conversion, laminar necrosis, or mass lesion  Workstation performed: [id number]         FL barium swallow video w speech   Final Result by ARTEMIO GONZALEZ (09/06 7821)      XR chest portable   Final Result by Melony Pinon MD ([id number])      No acute cardiopulmonary disease  Workstation performed: [id number]             ** Please Note: Fluency Direct voice to text software may have been used in the creation of this document   **

## 2022-09-12 NOTE — PROGRESS NOTES
09/12/22 0930   Pain Assessment   Pain Assessment Tool 0-10   Pain Score No Pain   Restrictions/Precautions   Precautions Bed/chair alarms;Cognitive; Fall Risk;Supervision on toilet/commode   Weight Bearing Restrictions No   ROM Restrictions No   Cognition   Overall Cognitive Status Impaired   Arousal/Participation Alert; Cooperative   Attention Attends with cues to redirect   Orientation Level Oriented X4   Memory Decreased short term memory;Decreased recall of recent events   Following Commands Follows one step commands with increased time or repetition   Sit to Stand   Type of Assistance Needed Physical assistance;Verbal cues; Adaptive equipment   Physical Assistance Level 26%-50%   Comment varied MIN to MODA with standing and CGA/GERTRUDIS when sitting with MOD VC's for sequencing/hand placement   Sit to Stand CARE Score 3   Bed-Chair Transfer   Type of Assistance Needed Physical assistance;Verbal cues; Adaptive equipment   Physical Assistance Level 25% or less   Comment with HHA to R and RW, multiple transfers   Chair/Bed-to-Chair Transfer CARE Score 3   Transfer Bed/Chair/Wheelchair   Adaptive Equipment Roller Walker;Hand Hold   Findings Pt's board in room updated to amb with staff to/from bathroom with use of RW  If fatigues utilize WC for sit pivot transfers  Walk 10 Feet   Type of Assistance Needed Physical assistance;Verbal cues; Adaptive equipment   Physical Assistance Level 25% or less   Comment RHHA and RW   Walk 10 Feet CARE Score 3   Walk 50 Feet with Two Turns   Type of Assistance Needed Physical assistance;Verbal cues; Adaptive equipment   Physical Assistance Level 25% or less   Comment RHHA and RW   Walk 50 Feet with Two Turns CARE Score 3   Walk 150 Feet   Type of Assistance Needed Physical assistance;Verbal cues; Adaptive equipment   Physical Assistance Level 25% or less   Comment RHHA and RW   Walk 150 Feet CARE Score 3   Ambulation   Does the patient walk? 2   Yes   Primary Mode of Locomotion Prior to Admission Walk   Distance Walked (feet) 200 ft  (x3)   Assist Device Hand Hold;Roller Walker  (x2 trials with RW)   Gait Pattern Slow Linda;Decreased foot clearance;L foot drag; Forward Flexion;L hemiparesis; Shuffle;Step to;Narrow LUCY; Improper weight shift   Limitations Noted In Device Management; Heel Strike;Midline Orientation;Posture; Safety;Speed;Strength   Provided Assistance with: Balance;Weight Shift   Walk Assist Level Minimum Assist   Findings overall GERTRUDIS with both RW and R HHA  Pt able to get to CGA towards end of session with RW  Wheelchair mobility   Does the patient use a wheelchair? 1  Yes   Type of Wheelchair Used 1  Manual   Therapeutic Interventions   Other focused on cont HH dist amb and SPT with use of RW  Assessment   Treatment Assessment Pt participated in skilled PT session with increased focus on gait, functional transfers and STS transfers this session  Increased focus on use of RW as pt is progressing and able to advance LLE without the use of AFO  Pt remains MIN/MODA for sit to stand transfers, CGA/GERTRUDIS stand to sit, SPT with RW MIN/CGA, gait with RW MIN/CGA x150', MODA with WC mobility and cont MOD VC's for hand placement during tranfers  Pt cont to remain limited by decreased caregiver support, decreased I with functional transfers, poor righting reactions, decreased ROM to LLE and cont need for VC's with all transfers  Pt anticipates discharge to SNF for cont rehab services as pt will need to be I with all gait and functional transfers to return to apartment  Cont POC as tolerated  Problem List Decreased strength;Decreased range of motion;Decreased endurance; Impaired balance;Decreased coordination;Decreased mobility; Decreased cognition;Decreased safety awareness; Impaired tone   Barriers to Discharge Inaccessible home environment;Decreased caregiver support   PT Barriers   Functional Limitation Car transfers;Stair negotiation;Standing;Transfers; Walking; Wheelchair management   Plan Treatment/Interventions Functional transfer training;LE strengthening/ROM; Therapeutic exercise; Endurance training;Cognitive reorientation;Patient/family training;Gait training   Progress Progressing toward goals   Recommendation   PT Discharge Recommendation Post acute rehabilitation services   Equipment Recommended Wheelchair;Walker   PT Therapy Minutes   PT Time In 0930   PT Time Out 1030   PT Total Time (minutes) 60   PT Mode of treatment - Individual (minutes) 60   PT Mode of treatment - Concurrent (minutes) 0   PT Mode of treatment - Group (minutes) 0   PT Mode of treatment - Co-treat (minutes) 0   PT Mode of Treatment - Total time(minutes) 60 minutes   PT Cumulative Minutes 1885   Therapy Time missed   Time missed?  No

## 2022-09-12 NOTE — PROGRESS NOTES
[facility] Occupational Therapy Daily Note  Patient Active Problem List   Diagnosis    Other chest pain    Essential hypertension    Hyperlipidemia LDL goal <100    Stage 3 chronic kidney disease (HCC)    Osteoarthritis of both wrists    Chronic atrial fibrillation (HCC)    Tubulovillous adenoma    Gastroesophageal reflux disease without esophagitis    Pacemaker    B12 deficiency    Allergic rhinitis due to pollen    Cavus deformity of foot    Midline cystocele    Hallux abducto valgus, bilateral    Left renal artery stenosis (HCC)    Osteoarthritis of hip    Osteopenia    Pain due to onychomycosis of toenails of both feet    Left atrial enlargement    Lipoma of right upper extremity    Diverticulosis of colon    Abnormal nuclear stress test    Renal insufficiency    Arterial ischemic stroke, MCA (middle cerebral artery), right, acute ([facility] [address] )    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)    Anemia    At risk for venous thromboembolism (VTE)    DARRIUS (acute kidney injury) ([facility])    Spastic hemiparesis of left dominant side as late effect of cerebral infarction ([facility] [address] )    Valvular heart disease    CAD (coronary artery disease)    Urinary retention    Neck pain    Healthcare maintenance    Adjustment disorder with mixed emotional features    Cough    Thrush    At risk for delirium    Transient neurological symptoms       Past Medical History:   Diagnosis Date    A-fib (HCC)     Anemia     Arthritis     Breast pain, right     Chest pain on breathing     Colon polyp     Cough     Diverticulosis     Encounter for screening colonoscopy     H/O sick sinus syndrome     Heart murmur     High cholesterol     History of atrial fibrillation     Rate ontrolled  On xarelto 15mg (creatinine 50) Followed by Dr Isaura Martinez with Cardiology     History of weight loss     Report loose fitting clothes  Weight stable (3lbs difference)  Last colo showed small tubular adenoma x2  Breast biopsy last showed fibrocystic changes  TSH wnl  Will check cbc, bmp, spep   Hx of long term use of blood thinners     Hypertension     Irregular heart beat     Pacemaker     Preop examination     Shortness of breath     Viral bronchitis      Etiologic Diagnosis: right mca ischemic cva  Restrictions/Precautions  Precautions: Aspiration, Bed/chair alarms, Cognitive, Fall Risk, Supervision on toilet/commode  Weight Bearing Restrictions: No  ROM Restrictions: No  Braces or Orthoses: AFO  ADL Team Goal: Patient will be independent with ADLs with least restrictive device upon completion of rehab program  Occupational Therapy LTG's  Eating Oral care Bathing LB dress UB dress   Eating Goal: 06  Independent - Patient completes the activity by him/herself with no assistance from a helper  Oral Hygiene Goal: 06  Independent - Patient completes the activity by him/herself with no assistance from a helper  Shower/bathe self Goal: 06  Independent - Patient completes the activity by him/herself with no assistance from a helper  Lower body dressing Goal: 06  Independent - Patient completes the activity by him/herself with no assistance from a helper  Upper body dressing Goal: 06  Independent - Patient completes the activity by him/herself with no assistance from a helper  Toileting Toilet txf Func txf IADL Med    Toileting hygiene Goal: 06  Independent - Patient completes the activity by him/herself with no assistance from a helper  Toilet transfer Goal: 06  Independent - Patient completes the activity by him/herself with no assistance from a helper           OT interventions: Treatment/Interventions: ADL retraining, Functional transfer training, Therapeutic exercise, Endurance training, Cognitive reorientation, Equipment eval/education, Patient/family training, Compensatory technique education  Discharge Plan:  OT Discharge Recommendation: (P)  (24/7 assist/Sup)   DME: [facility]     09/12/22 0700   Pain Assessment Pain Assessment Tool 0-10   Pain Score No Pain   Lifestyle   Autonomy "my leg, it feels lighter "   Eating   Type of Assistance Needed Set-up / clean-up   Comment Pt engages in breakfast meal as NMR session  focus on L UE use for majority of meal  Pt was observed today to be able to spoon coffee from cup to mouth, handle coffee mug, and complete all forking today  Eating CARE Score 5   Lying to Sitting on Side of Bed   Type of Assistance Needed Supervision   Comment improved hip advancement to EOB, with more ease of L LE movement to EOB   Lying to Sitting on Side of Bed CARE Score 4   Sit to Stand   Type of Assistance Needed Physical assistance   Physical Assistance Level 26%-50%   Comment Still demo difficulty with L LE tone with difficulty bringin foot back under   Sit to Stand CARE Score 3   Bed-Chair Transfer   Type of Assistance Needed Physical assistance   Physical Assistance Level 51%-75%   Comment pt complete HHA in room ambulation to bathroom and back  MODA  at times for retropulsive LOB, with dec advancement of L LE and assist for weight shifting  Chair/Bed-to-Chair Transfer CARE Score 2   Toileting Hygiene   Type of Assistance Needed Physical assistance   Physical Assistance Level 26%-50%   Comment MOD A in stance for clothing management  Dec awareness of foot positioning during reaching in stance   Wali [address] Score 3   Toilet Transfer   Type of Assistance Needed Physical assistance   Physical Assistance Level 26%-50%   Comment grab bar use on raised seat   Toilet Transfer CARE Score 3   Left Upper Extremity-Strength   LUE Strength Comment Pt able to produce 35 pounds of pressure on L UE gross grasp   Exercise Tools   UE Ergometer 5 min prograde for neural priming prior to activity  Neuromuscular Education   Functional Movement Patterns pt engages in seated UE AROM with tactile facilitation of scapular movements   Improved lower rotation with shoudler flexion able to obtain ~100 degrees shoudler flexion  Still cont to demo difficulty with scapular retraction with resting lowering winging  seated shoudler retraction with tactile facilitation for retraction  Table top shoudler extension in horizonal plane  hands on head with focus on External rotation  EXt rotation for difficult with active shoudler flexion  improved range reach with gravity eliminated on table top  Coordination   Fine Motor repeated 9 hole peg test L UE 40 30 with past requiring more than 2 min to complete  Gross motor coordination with coordination/speed portion of Fugyl wills at 8 82 seconds for 5x finger to knee test with a 3 second improvement  Additional Activities   Additional Activities Comments Pt engages in set up of HEP with Green thera putty for FMS   Activity Tolerance   Activity Tolerance Patient tolerated treatment well   Assessment   Treatment Assessment Pt participated in skilled OT treatment session with treatment focus on fxnl xfers, UE NMR, UE strengthening, UE endurance and FMC/GMC  Pt tolerated session well, with improvements in Func transfers with use of HHA today for in room mobilty  Will discuss with team advancement in transfer status to increase ambulation during day time  Pt cont to demo improvements in L UE function with drastic improvements in [facility], and AROM  Still limited by proximal shoulder spasticity  Pt's left UE function currently (progressing to  Refined functional assist  increased strength and fine control noted  able to complete 9 hole peg test in 35-70 seconds  both 3-jaw and lateral pinch patterns are present    Continue to focus neurological treatment plan:Repetitive Task training, Trunk restrained Reaching, Sensorimotor Training, Weight bearing, Body awareness, Gross motor coordination, Fine motor coordination, Functional Attention , Midline awareness, HEP Educaiton and REO GO   Prognosis Good   Problem List Decreased strength;Decreased range of motion;Decreased endurance; Impaired balance;Decreased mobility; Decreased cognition;Decreased coordination;Decreased safety awareness; Impaired tone   Plan   Progress Progressing toward goals   Recommendation   OT Discharge Recommendation   (24/7 assist/Sup)   OT Therapy Minutes   OT Time In 0700   OT Time Out 0830   OT Total Time (minutes) 90   OT Mode of treatment - Individual (minutes) 90   OT Mode of treatment - Concurrent (minutes) 0   OT Mode of treatment - Group (minutes) 0   OT Mode of treatment - Co-treat (minutes) 0   OT Mode of Treatment - Total time(minutes) 90 minutes   OT Cumulative Minutes 2861

## 2022-09-12 NOTE — PROGRESS NOTES
Internal Medicine Progress Note  Patient: Oswaldo Hare  Age/sex: 80 y o  female  Medical Record #: [id number]      ASSESSMENT/PLAN: (Interval History)  Oswaldo Hare is seen and examined and management for following issues:    Acute embolic right MCA CVA  · Had near occlusion of right MCA  · Felt to be a Xarelto failure and was switched to Pradaxa  · ECHO w/o thrombus  · Continue statin  · Baclofen discontinued 2/2 below  · On 9/2, 9/5 and 9/6 states she is saying a specific word that means nothing  She would repeat a particular word that is not really a word and she was aware   She would speak fluently about it at the same time   Her neuro exam was unchanged   Neuro saw, EEG normal/CTH negative for new findings   Neurology suspects the episodes are not neurologic in nature and does not recommend any further neurologic work-up      Chronic atrial fibrillation  · Sees Dr Cass Catalan as OP  · Rates controlled  · Cont Pradaxa/Bystolic     PPM  · VVI  · Is not MRI conditional  · 100% paced on EKG     Valvular disease  · Current ECHO:  LVEF 65%, mod AR/mild AS, mod TR with severely elevated RV systolic pressure, mild-mod MR, mod LAE/mild NEO  · Euvolemic currently     HTN  · Home:  Verapamil 240mg qhs/Cozaar 961 mg qd/Bystolic 5mg qd/Lasix 37MP qd/Aldactone 25mg qd  · Here:  Verapamil 240mg UNZ/EWJXXX 42LP qd/Bystolic 5mg qd  · OP note states has left RA stenosis but no testing in OP records or Care Everywhere to verify  · No changes today     HLD  · Home:  Crestor 10mg qd = Neuro rec when discharged to reduce to 5mg qd  · Here:  Pravachol 40mg qd     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  · S/p NS 1 liter   · Back to baseline = as OP GFR 50's to 60's   GFR here 40's to 60's  · Continue to hold Lasix and Aldactone for now     CAD  · Nonobstructive  · Card cath 8/2021 done for borderline abn stress test and found 50% mid RCA/40% mid LAD = no intervention done  · Continue statin  · Was not on ASA as an OP     Hypomagnesemia  · Cont 400 mg MagOx  · stable     Left chest pain  · Had left neck pain (not new for her) then pain under left breast 8/31/22 = reproducible with palpation  · Cardiac w/u negative  · Etio was her left arm spasticity   · 2/2 developing odd behaviors over her time here in [facility] so Baclofen reduced and then discontinued      Cough   · CXR negative 8/25  · Flonase was not effective  · Has scheduled Tessalon perles 200mg TID/Chlorarseptic spray/pepcid/Mucinex  · Started z-pack 9/2 in case has tracheobronchitis which she had a x3 days and stopped when Keflex started by PMR for asymp bacteruria   · Cozaar likely not the cause since has been on for a long time  · Was saying that it felt like something in throat and was constantly spitting out her saliva = s/p VBS 9/6 w/o acute findings  · May have some thrush as below but overall likely was behavioral and has almost totally subsided  · Will reduce Tessalon perles to 100mg TID with eventual d/c or prn     Possible thrush  · On tongue may have had some mild thrush  · If so, then could have some esophagitis from it as well  · S/p empiric Mycostatin      Urine retention  · Improved  · Management per PMR  · Flomax added on 8/31/22  · Urine culture >60,000-69,000 Proteus = +asymp --> PMR tx'd with Keflex empirically x 7 days, completed 9/12     Vaginal yeast infection  · S/p Monistat pack/Diflucan 150mg x 1         DC planning: DC pending SNF bed          The above assessment and plan was reviewed and updated as determined by my evaluation of the patient on 9/12/2022      Labs:   Results from last 7 days   Lab Units 09/12/22  0526 09/07/22  1507   WBC Thousand/uL 6 57 7 16   HEMOGLOBIN g/dL 10 4* 11 5   HEMATOCRIT % 32 4* 35 0   PLATELETS Thousands/uL 182 237     Results from last 7 days   Lab Units 09/12/22  0526 09/07/22  1507   SODIUM mmol/L 138 136   POTASSIUM mmol/L 3 9 4 1   CHLORIDE mmol/L 109* 106   CO2 mmol/L 25 23   BUN mg/dL 18 17   CREATININE mg/dL 0 92 0 92   CALCIUM mg/dL 8 7 9 4             Results from last 7 days   Lab Units 09/09/22  0600   POC GLUCOSE mg/dl 91       Review of Scheduled Meds:  Current Facility-Administered Medications   Medication Dose Route Frequency Provider Last Rate    acetaminophen  650 mg Oral Q6H PRN Carly Andre MD      benzonatate  200 mg Oral TID TOMY Bond      bisacodyl  10 mg Rectal Daily PRN Carly Andre MD      cephalexin  500 mg Oral Q6H [address] Carly Andre MD      dabigatran etexilate  150 mg Oral Q12H [address] Carly Andre MD      guaiFENesin  600 mg Oral Q12H [address] Susanetta Blazing TOMY Piper      losartan  50 mg Oral Daily Ruby Blazing TOMY Piper      magnesium oxide  400 mg Oral Daily Carly Andre MD      melatonin  6 mg Oral QPM Sylwia Hagan,       menthol-methyl salicylate   Apply externally BID Lola Martin MD      nebivolol  5 mg Oral Daily Carly Andre MD      nitroglycerin  0 4 mg Sublingual Q5 Min PRN TOMY Bond      nystatin  500,000 Units Swish & Swallow 4x Daily TOMY Estrada      pantoprazole  40 mg Oral Early Morning Carly Andre MD      phenol  1 spray Mouth/Throat Q2H PRN Lola Martin MD      polyethylene glycol  17 g Oral Daily PRN Carly Andre MD      pravastatin  40 mg Oral Daily With Siena Shannon MD      tamsulosin  0 4 mg Oral Daily With Katarzyna Whitt MD      verapamil  240 mg Oral HS Carly Andre MD         Subjective/ HPI: Patient seen and examined  Patients overnight issues or events were reviewed with nursing or staff during rounds or morning huddle session  New or overnight issues include the following:     No new or overnight issues  Offers no complaints    ROS:   A 10 point ROS was performed; negative except as noted above       *Labs /Radiology studies reviewed  *Medications reviewed and reconciled as needed  *Please refer to order section for additional ordered labs studies  *Case discussed with primary attending during morning huddle case rounds    Physical Examination:  Vitals:   Vitals:    09/11/22 0607 09/11/22 1408 09/11/22 2041 09/12/22 0500   BP: 107/54 131/58 147/64 106/53   BP Location: Left arm Left arm Left arm Right arm   Pulse: 79 63 65 59   Resp: 18 18 18 16   Temp: 97 7 °F (36 5 °C) 98 1 °F (36 7 °C) 98 1 °F (36 7 °C) 97 8 °F (36 6 °C)   TempSrc: Oral Oral Oral Oral   SpO2: 98% 98% 98% 96%   Weight:       Height:           General Appearance: no distress, conversive  HEENT: PERRLA, conjuctiva normal; oropharynx clear; mucous membranes moist   Neck:  Supple, normal ROM  Lungs: CTA, normal respiratory effort, no retractions, expiratory effort normal  CV: regular rate and rhythm; no rubs/murmurs/gallops, PMI normal   ABD: soft; ND/NT; +BS  EXT: no edema  Skin: normal turgor, normal texture, no rashes  Psych: affect normal, mood normal  Neuro: AA      The above physical exam was reviewed and updated as determined by my evaluation of the patient on 9/12/2022  Invasive Devices  Report    None                    VTE Pharmacologic Prophylaxis: Pradaxa  Code Status: Level 1 - Full Code  Current Length of Stay: 24 day(s)      Total time spent:  30 minutes with more than 50% spent counseling/coordinating care  Counseling includes discussion with patient re: progress  and discussion with patient of his/her current medical state/information  Coordination of patient's care was performed in conjunction with primary service  Time invested included review of patient's labs, vitals, and management of their comorbidities with continued monitoring  In addition, this patient was discussed with medical team including physician and advanced extenders  The care of the patient was extensively discussed and appropriate treatment plan was formulated unique for this patient  ** Please Note:  voice to text software may have been used in the creation of this document   Although proof errors in transcription or interpretation are a potential of such software**

## 2022-09-12 NOTE — CASE MANAGEMENT
Cm received email correspondence from Beata Schroeder at [place] requesting clinical for auth for snf as lcd was 9/11  Cm confirmed as it was thought the lcd was 9/8  Cm confirmed having an approval for admission but was waiting for bed confirmation from [facility]  Omaira Caldera confirmed [address] 9/11 with expected dc 9/12 and cm needed to submit for auth today  New therapy/doc note printed and faxed to [facility] at [phone]  Cm mssg'd [facility] via [facility] to inquire about bed availability  Received mssg from [facility] that they do not have a bed today  Cm [facility], awaiting determination if they can accept and have a bed today  Cm also resent the [facility] to [facility] with updated info and to check on bed availability  Cm also [facility] requesting a determination and bed availability

## 2022-09-12 NOTE — PROGRESS NOTES
Pastoral Care Progress Note    2022  Patient: Jelena Avilez : 1940  Admission Date & Time: 2022 1522  MRN: [id number] CSN: [id number]      Fr Bean Pool visited pt to provide prayer, Holy Communion, and a blessing  Chaplains remain available                 Chaplaincy Interventions Utilized:   Exploration: Explored spiritual needs & resources    Collaboration: Facilitated respect for spiritual/cultural practice during hospitalization    Relationship Building: Cultivated a relationship of care and support    Ritual: Provided prayer and Provided ritual       22 1127   Clinical Encounter Type   Visited With Patient   Routine Visit Follow-up   Confucianism Encounters   Confucianism Needs Prayer   Sacramental Encounters   Communion Given Indicator Yes   Sacrament Other Other (Comment)  (Hope)

## 2022-09-12 NOTE — PROGRESS NOTES
09/12/22 1230   Pain Assessment   Pain Assessment Tool 0-10   Pain Score No Pain   Restrictions/Precautions   Precautions Bed/chair alarms;Cognitive; Fall Risk;Supervision on toilet/commode   Weight Bearing Restrictions No   ROM Restrictions No   Cognition   Overall Cognitive Status Impaired   Arousal/Participation Alert; Cooperative   Attention Attends with cues to redirect   Orientation Level Oriented X4   Memory Decreased short term memory;Decreased recall of recent events   Following Commands Follows one step commands with increased time or repetition   Sit to Lying   Type of Assistance Needed Physical assistance;Verbal cues; Adaptive equipment   Physical Assistance Level 25% or less   Comment A to LLE, pt able to lift up but not maintain it on bed   Sit to Lying CARE Score 3   Lying to Sitting on Side of Bed   Type of Assistance Needed Supervision; Adaptive equipment   Comment CS with use of L bed rail   Lying to Sitting on Side of Bed CARE Score 4   Sit to Stand   Type of Assistance Needed Physical assistance;Verbal cues; Adaptive equipment   Physical Assistance Level 26%-50%   Comment varied MIN to MODA with standing and CGA/GERTRUDIS when sitting with MOD VC's for sequencing/hand placement   Sit to Stand CARE Score 3   Bed-Chair Transfer   Type of Assistance Needed Physical assistance;Verbal cues; Adaptive equipment   Physical Assistance Level 25% or less   Comment with RW   Chair/Bed-to-Chair Transfer CARE Score 3   Transfer Bed/Chair/Wheelchair   Adaptive Equipment Roller Walker   Walk 10 Feet   Type of Assistance Needed Physical assistance;Verbal cues; Adaptive equipment   Physical Assistance Level 25% or less   Comment CGA/GERTRUDIS with RW   Walk 10 Feet CARE Score 3   Walk 50 Feet with Two Turns   Type of Assistance Needed Physical assistance;Verbal cues; Adaptive equipment   Physical Assistance Level 25% or less   Comment CGA/GERTRUDIS with RW   Walk 50 Feet with Two Turns CARE Score 3   Walk 150 Feet   Type of Assistance Needed Physical assistance;Verbal cues; Adaptive equipment   Physical Assistance Level 25% or less   Comment CGA/GERTRUDIS with RW   Walk 150 Feet CARE Score 3   Ambulation   Does the patient walk? 2  Yes   Primary Mode of Locomotion Prior to Admission Walk   Distance Walked (feet) 175 ft  (x2, 15' x2)   Assist Device Roller Walker   Gait Pattern Decreased foot clearance; Slow Linda;L foot drag;L hemiparesis; Narrow LUCY;Step to;Decreased L stance; Improper weight shift   Limitations Noted In Device Management; Endurance;Balance; Heel Strike; Safety;Speed;Strength;Swing   Provided Assistance with: Balance   Walk Assist Level Minimum Assist   Wheelchair mobility   Does the patient use a wheelchair? 1  Yes   Type of Wheelchair Used 1  Manual   Toilet Transfer   Type of Assistance Needed Physical assistance;Verbal cues; Adaptive equipment   Physical Assistance Level 25% or less   Comment SPT with use of RW, increased time required   Toilet Transfer CARE Score 3   Toilet Transfer   Surface Assessed Raised Toilet   Limitations Noted In Balance;Problem Solving; Safety   Findings VC's for hand placement, able to perform hygiene in stance as well as A with clothing management with RUE  Equipment Use   NuStep L2 x10 min BLE/BUE   Other Comments   Comments Increased time required in bathroom this session, pt then layed down to get bladder scanned before amb to therapy gym  Assessment   Treatment Assessment Pt session with increased focus on gait with use of RW as well as increased strengthening to BLE  Pt cont to be limited by decreased ROM to LLE, poor righting reactions and decreased safety awareness with cont VC's for STS transfers  Pt noted improved balance this session and able to stand to perform hygiene as well as A with RUE with clothing while holding on to RW with LUE vs grab bars  Pt noted improved endurance this session as well as she was able to amb to/from OT gym with use of RW CGA/GERTRUDIS   Pt cont to have retropulsion when performing sit to stand transfer bur was able to correct once given VC's this session  Pt is awaiting bed at SNF for cont rehab services  Problem List Decreased strength;Decreased endurance;Decreased range of motion; Impaired balance;Decreased mobility; Decreased coordination;Decreased cognition; Impaired judgement;Decreased safety awareness; Impaired tone   Barriers to Discharge Inaccessible home environment;Decreased caregiver support   PT Barriers   Functional Limitation Car transfers;Stair negotiation;Standing;Transfers; Walking; Wheelchair management   Plan   Treatment/Interventions Functional transfer training;LE strengthening/ROM; Therapeutic exercise; Endurance training;Cognitive reorientation;Patient/family training;Gait training;Bed mobility   Progress Progressing toward goals   Recommendation   PT Discharge Recommendation Post acute rehabilitation services   Equipment Recommended Wheelchair;Walker   PT Therapy Minutes   PT Time In 1230   PT Time Out 1330   PT Total Time (minutes) 60   PT Mode of treatment - Individual (minutes) 60   PT Mode of treatment - Concurrent (minutes) 0   PT Mode of treatment - Group (minutes) 0   PT Mode of treatment - Co-treat (minutes) 0   PT Mode of Treatment - Total time(minutes) 60 minutes   PT Cumulative Minutes 1945   Therapy Time missed   Time missed?  No

## 2022-09-12 NOTE — CASE MANAGEMENT
Received call from [address] [place] [address] at [facility]  They can accept pt tomorrow for short term rehab  Phone call placed to son and made him aware of the dc potential for tomorrow  email coorespondence with Luzma Eller at [facility] and made her aware, [place] is pending  Cm inquired with dr Mal Martinez about pt receiving booster prior to dc  Dr Mal Martinez confirmed pt received booster  Cm phoned [facility] and requested w/c [place] transport for tomorrow between 11-3  Cm to receive TT when trip is booked  [facility] van to  pt at 12pm tomorrow to the [facility]

## 2022-09-13 ENCOUNTER — TELEPHONE (OUTPATIENT)
Dept: INTERNAL MEDICINE CLINIC | Facility: CLINIC | Age: 82
End: 2022-09-13

## 2022-09-13 ENCOUNTER — TRANSITIONAL CARE MANAGEMENT (OUTPATIENT)
Dept: INTERNAL MEDICINE CLINIC | Facility: CLINIC | Age: 82
End: 2022-09-13

## 2022-09-13 VITALS
RESPIRATION RATE: 18 BRPM | DIASTOLIC BLOOD PRESSURE: 41 MMHG | BODY MASS INDEX: 24.65 KG/M2 | OXYGEN SATURATION: 97 % | TEMPERATURE: 97.8 F | SYSTOLIC BLOOD PRESSURE: 102 MMHG | WEIGHT: 139.11 LBS | HEIGHT: 63 IN | HEART RATE: 59 BPM

## 2022-09-13 PROCEDURE — 99232 SBSQ HOSP IP/OBS MODERATE 35: CPT | Performed by: INTERNAL MEDICINE

## 2022-09-13 PROCEDURE — 97112 NEUROMUSCULAR REEDUCATION: CPT

## 2022-09-13 PROCEDURE — 99239 HOSP IP/OBS DSCHRG MGMT >30: CPT

## 2022-09-13 PROCEDURE — 97535 SELF CARE MNGMENT TRAINING: CPT

## 2022-09-13 RX ORDER — BENZONATATE 200 MG/1
200 CAPSULE ORAL 3 TIMES DAILY PRN
Qty: 20 CAPSULE | Refills: 0
Start: 2022-09-13 | End: 2022-09-13 | Stop reason: SDUPTHER

## 2022-09-13 RX ORDER — BENZONATATE 100 MG/1
200 CAPSULE ORAL 3 TIMES DAILY PRN
Start: 2022-09-13

## 2022-09-13 RX ADMIN — MENTHOL, METHYL SALICYLATE 1 APPLICATION: 10; 15 CREAM TOPICAL at 08:33

## 2022-09-13 RX ADMIN — PANTOPRAZOLE SODIUM 40 MG: 40 TABLET, DELAYED RELEASE ORAL at 06:50

## 2022-09-13 RX ADMIN — ACETAMINOPHEN 650 MG: 325 TABLET ORAL at 06:50

## 2022-09-13 RX ADMIN — LOSARTAN POTASSIUM 50 MG: 50 TABLET, FILM COATED ORAL at 08:29

## 2022-09-13 RX ADMIN — DABIGATRAN ETEXILATE MESYLATE 150 MG: 150 CAPSULE ORAL at 08:29

## 2022-09-13 RX ADMIN — MAGNESIUM OXIDE TAB 400 MG (241.3 MG ELEMENTAL MG) 400 MG: 400 (241.3 MG) TAB at 08:29

## 2022-09-13 RX ADMIN — GUAIFENESIN 600 MG: 600 TABLET, EXTENDED RELEASE ORAL at 08:29

## 2022-09-13 RX ADMIN — BENZONATATE 100 MG: 100 CAPSULE ORAL at 08:29

## 2022-09-13 RX ADMIN — NYSTATIN 500000 UNITS: 100000 SUSPENSION ORAL at 08:29

## 2022-09-13 NOTE — CASE MANAGEMENT
Received auth via email from [facility], 3 days with NRD 9/15 auth number [id number]  Info sent via aidin to Nate  Phone call placed to [place] at [facility] confirming dc time of 12pm  Phone call placed to pts son and made him aware of dc arrangements  Facility requested copy of pts vaccination card, son made aware

## 2022-09-13 NOTE — PLAN OF CARE
Problem: MOBILITY - ADULT  Goal: Maintain or return to baseline ADL function  Description: INTERVENTIONS:  -  Assess patient's ability to carry out ADLs; assess patient's baseline for ADL function and identify physical deficits which impact ability to perform ADLs (bathing, care of mouth/teeth, toileting, grooming, dressing, etc )  - Assess/evaluate cause of self-care deficits   - Assess range of motion  - Assess patient's mobility; develop plan if impaired  - Assess patient's need for assistive devices and provide as appropriate  - Encourage maximum independence but intervene and supervise when necessary  - Involve family in performance of ADLs  - Assess for home care needs following discharge   - Consider OT consult to assist with ADL evaluation and planning for discharge  - Provide patient education as appropriate  Outcome: Progressing  Goal: Maintains/Returns to pre admission functional level  Description: INTERVENTIONS:  - Set and communicate daily mobility goal to care team and patient/family/caregiver  - Collaborate with rehabilitation services on mobility goals if consulted  - Perform Range of Motion 3 times a day  - Reposition patient every 2 hours    - Dangle patient 3 times a day  - Stand patient 3 times a day  - Ambulate patient 3 times a day  - Out of bed to chair 3 times a day   - Out of bed for meals 3 times a day  - Out of bed for toileting  - Record patient progress and toleration of activity level   Outcome: Progressing     Problem: Prexisting or High Potential for Compromised Skin Integrity  Goal: Skin integrity is maintained or improved  Description: INTERVENTIONS:  - Identify patients at risk for skin breakdown  - Assess and monitor skin integrity  - Assess and monitor nutrition and hydration status  - Monitor labs   - Assess for incontinence   - Turn and reposition patient  - Assist with mobility/ambulation  - Relieve pressure over bony prominences  - Avoid friction and shearing  - Provide appropriate hygiene as needed including keeping skin clean and dry  - Evaluate need for skin moisturizer/barrier cream  - Collaborate with interdisciplinary team   - Patient/family teaching  - Consider wound care consult   Outcome: Progressing     Problem: Potential for Falls  Goal: Patient will remain free of falls  Description: INTERVENTIONS:  - Educate patient/family on patient safety including physical limitations  - Instruct patient to call for assistance with activity   - Consult OT/PT to assist with strengthening/mobility   - Keep Call bell within reach  - Keep bed low and locked with side rails adjusted as appropriate  - Keep care items and personal belongings within reach  - Initiate and maintain comfort rounds  - Make Fall Risk Sign visible to staff  - Offer Toileting every 2-4 Hours, in advance of need  - Initiate/Maintain bed/chair alarm  - Obtain necessary fall risk management equipment: nonskid footwear  - Apply yellow socks and bracelet for high fall risk patients  - Consider moving patient to room near nurses station  Outcome: Progressing     Problem: PAIN - ADULT  Goal: Verbalizes/displays adequate comfort level or baseline comfort level  Description: Interventions:  - Encourage patient to monitor pain and request assistance  - Assess pain using appropriate pain scale  - Administer analgesics based on type and severity of pain and evaluate response  - Implement non-pharmacological measures as appropriate and evaluate response  - Consider cultural and social influences on pain and pain management  - Notify physician/advanced practitioner if interventions unsuccessful or patient reports new pain  Outcome: Progressing     Problem: INFECTION - ADULT  Goal: Absence or prevention of progression during hospitalization  Description: INTERVENTIONS:  - Assess and monitor for signs and symptoms of infection  - Monitor lab/diagnostic results  - Monitor all insertion sites, i e  indwelling lines, tubes, and drains  - Monitor endotracheal if appropriate and nasal secretions for changes in amount and color  - [place] appropriate cooling/warming therapies per order  - Administer medications as ordered  - Instruct and encourage patient and family to use good hand hygiene technique  - Identify and instruct in appropriate isolation precautions for identified infection/condition  Outcome: Progressing     Problem: DISCHARGE PLANNING  Goal: Discharge to home or other facility with appropriate resources  Description: INTERVENTIONS:  - Identify barriers to discharge w/patient and caregiver  - Arrange for needed discharge resources and transportation as appropriate  - Identify discharge learning needs (meds, wound care, etc )  - Arrange for interpretive services to assist at discharge as needed  - Refer to Case Management Department for coordinating discharge planning if the patient needs post-hospital services based on physician/advanced practitioner order or complex needs related to functional status, cognitive ability, or social support system  Outcome: Progressing     Problem: Nutrition/Hydration-ADULT  Goal: Nutrient/Hydration intake appropriate for improving, restoring or maintaining nutritional needs  Description: Monitor and assess patient's nutrition/hydration status for malnutrition  Collaborate with interdisciplinary team and initiate plan and interventions as ordered  Monitor patient's weight and dietary intake as ordered or per policy  Utilize nutrition screening tool and intervene as necessary  Determine patient's food preferences and provide high-protein, high-caloric foods as appropriate       INTERVENTIONS:  - Monitor oral intake, urinary output, labs, and treatment plans  - Assess nutrition and hydration status and recommend course of action  - Evaluate amount of meals eaten  - Assist patient with eating if necessary   - Allow adequate time for meals  - Recommend/ encourage appropriate diets, oral nutritional supplements, and vitamin/mineral supplements  - Order, calculate, and assess calorie counts as needed  - Recommend, monitor, and adjust tube feedings and TPN/PPN based on assessed needs  - Assess need for intravenous fluids  - Provide specific nutrition/hydration education as appropriate  - Include patient/family/caregiver in decisions related to nutrition  Outcome: Progressing

## 2022-09-13 NOTE — TELEPHONE ENCOUNTER
Noah Hoover from [facility] is calling to confirm if Rosuvastatin is still an active medication for this patient  Last filled 6/2 for 90 days  They are calling to confirm

## 2022-09-13 NOTE — PROGRESS NOTES
[facility] Occupational Therapy Daily Note  Patient Active Problem List   Diagnosis    Other chest pain    Essential hypertension    Hyperlipidemia LDL goal <100    Stage 3 chronic kidney disease (HCC)    Osteoarthritis of both wrists    Chronic atrial fibrillation (HCC)    Tubulovillous adenoma    Gastroesophageal reflux disease without esophagitis    Pacemaker    B12 deficiency    Allergic rhinitis due to pollen    Cavus deformity of foot    Midline cystocele    Hallux abducto valgus, bilateral    Left renal artery stenosis (HCC)    Osteoarthritis of hip    Osteopenia    Pain due to onychomycosis of toenails of both feet    Left atrial enlargement    Lipoma of right upper extremity    Diverticulosis of colon    Abnormal nuclear stress test    Renal insufficiency    Arterial ischemic stroke, MCA (middle cerebral artery), right, acute ([address] )    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 ([address] )    Anemia    At risk for venous thromboembolism (VTE)    DARRIUS (acute kidney injury) ([address] )    Spastic hemiparesis of left dominant side as late effect of cerebral infarction ([address] )    Valvular heart disease    CAD (coronary artery disease)    Urinary retention    Neck pain    Healthcare maintenance    Adjustment disorder with mixed emotional features    Cough    Thrush    At risk for delirium    Transient neurological symptoms       Past Medical History:   Diagnosis Date    A-fib (HCC)     Anemia     Arthritis     Breast pain, right     Chest pain on breathing     Colon polyp     Cough     Diverticulosis     Encounter for screening colonoscopy     H/O sick sinus syndrome     Heart murmur     High cholesterol     History of atrial fibrillation     Rate ontrolled  On xarelto 15mg (creatinine 50) Followed by   [facility] with Cardiology     History of weight loss     Report loose fitting clothes  Weight stable (3lbs difference)  Last colo showed small tubular adenoma x2  Breast biopsy last showed fibrocystic changes  TSH wnl  Will check cbc, bmp, spep  Hx of long term use of blood thinners     Hypertension     Irregular heart beat     Pacemaker     Preop examination     Shortness of breath     Viral bronchitis      Etiologic Diagnosis: right mca ischemic cva  Restrictions/Precautions  Precautions: Aspiration, Bed/chair alarms, Cognitive, Fall Risk, Supervision on toilet/commode  Weight Bearing Restrictions: No  ROM Restrictions: No  Braces or Orthoses: AFO  ADL Team Goal: Patient will be independent with ADLs with least restrictive device upon completion of rehab program  Occupational Therapy LTG's  Eating Oral care Bathing LB dress UB dress   Eating Goal: 06  Independent - Patient completes the activity by him/herself with no assistance from a helper  Oral Hygiene Goal: 06  Independent - Patient completes the activity by him/herself with no assistance from a helper  Shower/bathe self Goal: 06  Independent - Patient completes the activity by him/herself with no assistance from a helper  Lower body dressing Goal: 06  Independent - Patient completes the activity by him/herself with no assistance from a helper  Upper body dressing Goal: 06  Independent - Patient completes the activity by him/herself with no assistance from a helper  Toileting Toilet txf Func txf IADL Med    Toileting hygiene Goal: 06  Independent - Patient completes the activity by him/herself with no assistance from a helper  Toilet transfer Goal: 06  Independent - Patient completes the activity by him/herself with no assistance from a helper           OT interventions: Treatment/Interventions: ADL retraining, Functional transfer training, Therapeutic exercise, Endurance training, Cognitive reorientation, Equipment eval/education, Patient/family training, Compensatory technique education  Discharge Plan:  OT Discharge Recommendation:  (24/7 assist/Sup)   DME:  none     09/13/22 0900   Pain Assessment   Pain Assessment Tool 0-10   Pain Score No Pain   Lifestyle Autonomy "Im leaving today, I will go there then go home "   Eating   Type of Assistance Needed Independent   Eating CARE Score 6   Oral Hygiene   Type of Assistance Needed Supervision   Comment in stance at sink w/ RW   Oral Hygiene CARE Score 4   Shower/Bathe Self   Type of Assistance Needed Supervision   Comment seated for all bathing aspects on tub bench and grab abrs  lateral weight shifting for buttocks bathing   Shower/Bathe Self CARE Score 4   Tub/Shower Transfer   Findings CGA lateral side step into shower   Upper Body Dressing   Type of Assistance Needed Independent   Upper Body Dressing CARE Score 6   Lower Body Dressing   Type of Assistance Needed Physical assistance   Physical Assistance Level 26%-50%   Comment SLight assist for L LE management  Pt able to complete forward reach and elevate toes to place underwear andpants over foot  assist for underwear over hips  MIN A for balance in stance   Lower Body Dressing CARE Score 3   Putting On/Taking Off Footwear   Type of Assistance Needed Physical assistance   Physical Assistance Level 26%-50%   Comment pt able to doff socks in sitting w/ forward reaching  able to don R foot sock and shoe  Pt req inc time for management of L LE  able to place sock over toes with assist and manage the rest of the way  pt able to place foot into shoe, but req assist for further palcement  Tone still limiting abilty to complete cross leg tech     Putting On/Taking Off Footwear CARE Score 3   Picking Up Object   Type of Assistance Needed Physical assistance   Physical Assistance Level 25% or less   Picking Up Object CARE Score 3   Roll Left and Right   Type of Assistance Needed Supervision   Roll Left and Right CARE Score 4   Sit to Lying   Type of Assistance Needed Supervision   Sit to Lying CARE Score 4   Lying to Sitting on Side of Bed   Type of Assistance Needed Supervision   Lying to Sitting on Side of Bed CARE Score 4   Sit to Stand   Type of Assistance Needed Physical assistance   Physical Assistance Level 26%-50%   Comment cont tactile assist for anterior weight shifting and L LE foot placement  Sit to Stand CARE Score 3   Bed-Chair Transfer   Type of Assistance Needed Physical assistance   Physical Assistance Level 25% or less   Comment RW use  increased time to manage L LE advancement  Verbal cues at times for narrow base of support  Chair/Bed-to-Chair Transfer CARE Score 3   Toileting Hygiene   Type of Assistance Needed Physical assistance   Physical Assistance Level 26%-50%   Toileting Hygiene CARE Score 3   Toilet Transfer   Type of Assistance Needed Physical assistance   Physical Assistance Level 25% or less   Toilet Transfer CARE Score 3   ROM - Left Upper Extremities    LUE ROM Comment pt provided with HEP for shoudler unit  4 exercises provided for scapular retraction, external rotation,   Neuromuscular Education   Comments ADL again focusing on L UE use, weight shifting in standing   Splinting   Splinting Comments Pt again educated on no need for resting hand splint at this time  discussed indications for use if tone/tighness increases  Additional Activities   Additional Activities Comments majority of session focusing on functional tasks in standing/ Pt engages in room preparation for packing   Assessment   Treatment Assessment Pt participates in skilled OT session focus on ADL performance for discharge, functional standing tolerance/balance and UE NMR forced use  Pt reports no questions or concerns regarding D/C, pt was supplied UE HEP  Pt has demonstrated continued improvement in basic functional abilities with ADLs  Pt surpassed goals set, but unable to return home at this time  D/c so sub acute rehab for further cont rehab with her ultimate goal to retrun home     OT Therapy Minutes   OT Time In 0900   OT Time Out 1130   OT Total Time (minutes) 150   OT Mode of treatment - Individual (minutes) 150   OT Mode of treatment - Concurrent (minutes) 0   OT Mode of treatment - Group (minutes) 0   OT Mode of treatment - Co-treat (minutes) 0   OT Mode of Treatment - Total time(minutes) 150 minutes   OT Cumulative Minutes 2065

## 2022-09-13 NOTE — PROGRESS NOTES
Internal Medicine Progress Note  Patient: Lawyer Dangelo  Age/sex: 80 y o  female  Medical Record #: [id number]      ASSESSMENT/PLAN: (Interval History)  Lawyer Dangelo is seen and examined and management for following issues:    Acute embolic right MCA CVA  · Had near occlusion of right MCA  · Felt to be a Xarelto failure and was switched to Pradaxa  · ECHO w/o thrombus  · Continue statin  · Baclofen discontinued 2/2 below  · On 9/2, 9/5 and 9/6 states she is saying a specific word that means nothing  She would repeat a particular word that is not really a word and she was aware   She would speak fluently about it at the same time   Her neuro exam was unchanged   Neuro saw, EEG normal/CTH negative for new findings  Neurology suspects the episodes are not neurologic in nature and does not recommend any further neurologic work-up   Good possibility from intol to Baclofen     Chronic atrial fibrillation  · Sees Dr Shannon Wells as OP  · Rates controlled  · Cont Pradaxa/Bystolic     PPM  · VVI  · Is not MRI conditional  · 100% paced on EKG     Valvular disease  · Current ECHO:  LVEF 65%, mod AR/mild AS, mod TR with severely elevated RV systolic pressure, mild-mod MR, mod LAE/mild NEO  · Euvolemic currently     HTN  · Home:  Verapamil 240mg qhs/Cozaar 128 mg qd/Bystolic 5mg qd/Lasix 84IY qd/Aldactone 25mg qd  · Here:  Verapamil 240mg VJC/ZYOOSF 62PM qd/Bystolic 5mg qd  · OP note states has left RA stenosis but no testing in OP records or Care Everywhere to verify  · Bystolic held this AM for   · Keep same hold parameters for d/c to SNF but may need to cut the Verapamil to 180mg qd  · No changes today     HLD  · Home:  Crestor 10mg qd = Neuro rec when discharged to reduce to 5mg qd  · Here:  Pravachol 40mg qd     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  · S/p NS 1 liter   · Back to baseline = as OP GFR 50's to 60's   GFR here 40's to 60's  · Continue to hold Lasix and Aldactone for now     CAD  · Nonobstructive  · Card cath 8/2021 done for borderline abn stress test and found 50% mid RCA/40% mid LAD = no intervention done  · Continue statin  · Was not on ASA as an OP     Hypomagnesemia  · Cont 400 mg MagOx  · stable     Left chest pain  · Had left neck pain (not new for her) then pain under left breast 22 = reproducible with palpation  · Cardiac w/u negative  · Etio was her left arm spasticity   · 2/2 developing odd behaviors over her time here in [facility] so Baclofen reduced and then discontinued      Cough   · CXR negative   · Flonase was not effective  · Has scheduled Tessalon perles 100mg TID/Chlorarseptic spray/pepcid/Mucinex  · Started z-pack  in case has tracheobronchitis which she had a x3 days and stopped when Keflex started by PMR for asymp bacteruria   · Cozaar likely not the cause since has been on for a long time  · Was saying that it felt like something in throat and was constantly spitting out her saliva = s/p VBS  w/o acute findings  · May have had some thrush as below but overall likely was behavioral and has almost subsided     Possible thrush  · On tongue may have had some mild thrush  · If so, then could have some esophagitis from it as well  · S/p empiric Mycostatin      Urine retention  · Improved  · Management per PMR  · Flomax added on 22  · Urine culture >60,000-69,000 Proteus = +asymp --> PMR tx'd with Keflex empirically x 7 days, completed      Vaginal yeast infection  · S/p Monistat pack/Diflucan 150mg x 1         DC plannin22           The above assessment and plan was reviewed and updated as determined by my evaluation of the patient on 2022      Labs:   Results from last 7 days   Lab Units 22  0526 22  1507   WBC Thousand/uL 6 57 7 16   HEMOGLOBIN g/dL 10 4* 11 5   HEMATOCRIT % 32 4* 35 0   PLATELETS Thousands/uL 182 237     Results from last 7 days   Lab Units 22  0526 22  1507   SODIUM mmol/L 138 136   POTASSIUM mmol/L 3 9 4 1   CHLORIDE mmol/L 109* 106 CO2 mmol/L 25 23   BUN mg/dL 18 17   CREATININE mg/dL 0 92 0 92   CALCIUM mg/dL 8 7 9 4             Results from last 7 days   Lab Units 09/09/22  0600   POC GLUCOSE mg/dl 91       Review of Scheduled Meds:  Current Facility-Administered Medications   Medication Dose Route Frequency Provider Last Rate    acetaminophen  650 mg Oral Q6H PRN Jason Forte MD      benzonatate  100 mg Oral TID Frazier Litten, CRNP      bisacodyl  10 mg Rectal Daily PRN Jason Forte MD      dabigatran etexilate  150 mg Oral Q12H [address] Jason Forte MD      guaiFENesin  600 mg Oral Q12H [address] TOMY Hwang      losartan  50 mg Oral Daily TOMY Hwang      magnesium oxide  400 mg Oral Daily Jason Forte MD      melatonin  6 mg Oral QPM Edison Bradley DO      menthol-methyl salicylate   Apply externally BID Rich Soto MD      nebivolol  5 mg Oral Daily Jason Forte MD      nitroglycerin  0 4 mg Sublingual Q5 Min PRN TOMY Hwang      pantoprazole  40 mg Oral Early Morning Jason Forte MD      phenol  1 spray Mouth/Throat Q2H PRN Rich Soto MD      polyethylene glycol  17 g Oral Daily PRN Jason Forte MD      pravastatin  40 mg Oral Daily With Santino Sloan MD      tamsulosin  0 4 mg Oral Daily With Supriya Orlando MD      verapamil  240 mg Oral HS Jason Forte MD         Subjective/ HPI: Patient seen and examined  Patients overnight issues or events were reviewed with nursing or staff during rounds or morning huddle session  New or overnight issues include the following:     No new or overnight issues  Offers no complaints    ROS:   A 10 point ROS was performed; negative except as noted above       *Labs /Radiology studies reviewed  *Medications reviewed and reconciled as needed  *Please refer to order section for additional ordered labs studies  *Case discussed with primary attending during morning huddle case rounds    Physical Examination:  Vitals:   Vitals:    09/12/22 2103 09/12/22 2157 09/13/22 0710 09/13/22 0839   BP: 124/56 124/56 124/60 (!) 102/41   BP Location:  Right arm Right arm Right arm   Pulse:  67 64 59   Resp:  17 18 18   Temp:  98 °F (36 7 °C) 97 8 °F (36 6 °C)    TempSrc:  Oral Oral    SpO2:  97% 97%    Weight:       Height:           General Appearance: no distress, conversive  HEENT: PERRLA, conjuctiva normal; oropharynx clear; mucous membranes moist   Neck:  Supple, normal ROM  Lungs: CTA, normal respiratory effort, no retractions, expiratory effort normal  CV: regular rate and rhythm; no rubs/murmurs/gallops, PMI normal   ABD: soft; ND/NT; +BS  EXT: no edema  Skin: normal turgor, normal texture, no rashes  Psych: affect normal, mood normal  Neuro: AAO      The above physical exam was reviewed and updated as determined by my evaluation of the patient on 9/13/2022  Invasive Devices  Report    None                    VTE Pharmacologic Prophylaxis: Pradaxa  Code Status: Level 1 - Full Code  Current Length of Stay: 25 day(s)      Total time spent:  30 minutes with more than 50% spent counseling/coordinating care  Counseling includes discussion with patient re: progress  and discussion with patient of his/her current medical state/information  Coordination of patient's care was performed in conjunction with primary service  Time invested included review of patient's labs, vitals, and management of their comorbidities with continued monitoring  In addition, this patient was discussed with medical team including physician and advanced extenders  The care of the patient was extensively discussed and appropriate treatment plan was formulated unique for this patient  ** Please Note:  voice to text software may have been used in the creation of this document   Although proof errors in transcription or interpretation are a potential of such software**

## 2022-09-14 NOTE — TELEPHONE ENCOUNTER
Called and advised Eloy Medeiros from [facility] that this is active on her med list, nothing further

## 2022-09-15 NOTE — PHYSICAL THERAPY NOTE
PHYSICAL THERAPY DISCHARGE SUMMARY    Patient presenting to [facility] with an Impairment of mobility, safety, Activities of Daily Living (ADLs), and cognitive/communication skills due to Stroke: Right MCA Ischemic CVA  On eval, patient presenting with impairments and limitations including weakness, decreased ROM, impaired balance, decreased endurance, impaired coordination, gait deviations, decreased activity tolerance, decreased functional mobility tolerance, altered sensation, decreased safety awareness, fall risk, orthopedic restrictions and impaired tone  She responded fairly to therapeutic activity and exercise, neuro re-ed and transfer, gait and stair training  She was however unable to progress past assist level goals and lived alone  She required a transfer to SNF for continued skilled PT intervention where she would benefit in order to maximize her overall strength, function, mobility and (I)       Rebecca Salcedo, PT

## 2022-09-16 NOTE — PROGRESS NOTES
Occupational Therapy Discharge Summary       Pt has made good Progress in Occupational Therapy  Pt did not meet long term goals  Pt progressed to Moderate assistance  level for functional transfers with RW and W/C  Moderate assistance level for ADL function with use of W/C  Pt discharged to skilled nursing rehab facility for further rehab as pt was unable to safely return home at this time

## 2022-09-19 ENCOUNTER — TELEPHONE (OUTPATIENT)
Dept: NEUROLOGY | Facility: CLINIC | Age: 82
End: 2022-09-19

## 2022-09-19 NOTE — TELEPHONE ENCOUNTER
Per Hospital Notes:    Marcus Francis need follow up in in 6 weeks with neurovascular Attending/AP/Resident    Beth from the [address] called to schedule the patient's hospital follow up, I offered 10-3-22 at the [facility] office with Santos Hitchcock at  am and she accepted

## 2022-09-21 ENCOUNTER — TELEPHONE (OUTPATIENT)
Dept: NEUROLOGY | Facility: CLINIC | Age: 82
End: 2022-09-21

## 2022-09-21 NOTE — TELEPHONE ENCOUNTER
Post CVA Discharge Follow Up  Hospitalization: 8/19/22-9/13/22    According to chart, patient discharged to   [facility] at, reached the patient's nurse  She reports the patient is doing well  Since discharge, patient has not experienced any new or worsening stroke-like symptoms  Ambulation / ADLs:  Patient mobilizing via walker  She is assist X1  She continues to require assistance with ADLs and transfers  Patient maintained on a regular consistency diet  She remains continent at this time  Medication Review:  I reviewed medications with them  There have not been any medication changes since discharge from the hospital  No reported missed doses, medication side effects, or signs of bleeding  Last reported /72    Appointments:  Reminded her of the patient's stroke hospital follow up appointment on 10/3/22  Provided date/time/location

## 2022-09-27 ENCOUNTER — TELEPHONE (OUTPATIENT)
Dept: NEUROLOGY | Facility: CLINIC | Age: 82
End: 2022-09-27

## 2022-09-27 ENCOUNTER — PATIENT OUTREACH (OUTPATIENT)
Dept: INTERNAL MEDICINE CLINIC | Facility: CLINIC | Age: 82
End: 2022-09-27

## 2022-09-27 NOTE — TELEPHONE ENCOUNTER
----- Message from Redd Bernard sent at 9/27/2022  8:23 AM EDT -----  Pt was scheduled on 09/19/22 by nurse  However I called pt to confirm spoke to son and he stated it will be difficult since pt can barely walk and requested any way for assistance in transportation  Pt appt is 10/03/22 @ 7:15 a m

## 2022-09-27 NOTE — PROGRESS NOTES
SW met with pts son today in regard to [address] services  Per pts son, pt recently had a stroke and was in rehab for two and a half weeks  Pt DC to home where she resides alone  SW reviewed pt chart and it is noted that pt has been approved for [place] program  JULIOCESAR inquired if pt has [address] services now and pts son states pt has [address] 4 days per week, seven hours each day  Pts son feels that this is not enough time and pt needs additional hours  JULIOCESAR inquired if the family has reached out to the  Kim Benson, and pts son spoke to her yesterday  Per pts son, they had lost communication and he was told to wait until October 4th to hear back from her in regard to additional hours  I attempted to contact Cathypatito Benson today also @ [phone] and her number was not in services, I attempted the call several times  JULIOCESAR then contacted Gabriela's supervisor Elva Ward @[phone] and left a message to please return [facility] call  I also advised her I was attempting to reach Mihaipatito Plan and was unable to reach her  SWCM did speak to pts son about self pay HHC and that is not an option for pt and family  At this time, [facility] will wait for return call from  to inquire if pt is eligible for more hours or if pt can be reevaluated since pt had a stroke  [facility] will continue to support pt and family  Update: Cathypatito Benson returned [facility] call and clarified that she has an appt scheduled with pt and her son on October 4th for a re evaluation for additional hours for [address]  Pt currently receives 30 hours per week  Per gabriela she spoke to pts son this morning to confirm the meeting  SW will remain available  Dragan Chapman will contact [facility] with update

## 2022-09-27 NOTE — TELEPHONE ENCOUNTER
Spoke with son to confirm pt's upcoming appt on 10/03/22 pt's son stated "pt has a difficult time walking" Pt asked for assistance with transportation, I informed pt I will send a message over to the  team and they will assist from there

## 2022-09-27 NOTE — TELEPHONE ENCOUNTER
MSW phoned the [place] at [place] [address] ([facility] where patient went for continued rehab after an [facility] stay) at [phone]  MSW spoke with Beth who advised that patient was discharged on Saturday 9/24 to home with family  Beth stated that patient "had no problem walking" at discharge  MSW phoned patient's son, Rowan Hassan, this date at [phone]  Rowan Hassan stated that patient was "given a favor" of a wheelchair [employer] ride home when she was discharged from the SNF  Rowan Hassan stated that patient's left side was affected by the stroke and that she "cannot walk" and that her balance is "not too good"  Rowan Hassan did state that patient is able to stand with her walker with help  Rowan Hassan stated that they are supposed to be receiving a wheelchair today between 10AM-5PM, but it was supposed to come the other day but never did  MSW discussed transportation options - will check coverage for wheelchair [employer] through insurance first, versus needing to apply for publics transportation (can take upwards of 3 weeks)

## 2022-09-27 NOTE — TELEPHONE ENCOUNTER
MSW phoned patient's insurance at [phone] and spoke with Ozzy Helms to inquire if non-emergency medical transportation to an medical office is a covered service under patient's plan  Ozzy Helms stated that patient's plan covers wheelchair [employer] services the same as ambulance  Ozzy Helms stated that if an "in-network" company is used that no authorization is required but that if an out of network ambulance provider is used that a prior [address] [facility] [address] will be needed  MSW asked Ozzy Helms to provide in-network ambulance companies  Ozzy Helms provided same the companies below  MSW contacted them with the following results:    [address]  [phone]  *fast busy signal      [address]  *incorrect number  MSW located a # for this company as [phone]  They are located in [place]  This writer was told that they merged with a company called [facility]  MSW's call was transferred to [facility] - they do not service the [facility] area for wheelchair [employer] transportation  [address]  [phone]  *fast busy signal  Google search indicates this company is permanently closed    MSW was skeptical as this writer is not familiar with wheelchair [employer] transports being covered by insurance  Ozzy Helms also provided the number for [employer] as [phone] but stated that to his knowledge, they only provide car transports for patients to get to medical appts  Ozzy Helms stated that ride requests must be called into [employer] 3 days in advance of the trip and that patients only have 100 one way trips/50 round trip transportation per calendar year  All trips must be within a 60 mile radius  Ozzy Helms also stated that cancellations must be called in 2 hours in advance and that patients must be ready 1 hour in advance of appt  MSW phoned [employer] at [phone] and spoke with Trena   She stated that they can arrange wheelchair [employer] transports for members as they do contract with wheelchair [employer]  [employer] placed a request for patient to be transported via wheelchair on:    Monday 10/3 from [facility], [address], [place], [address] to [address], [address], [address]   will be at 615AM to get patient to her 715AM appt on time  Per [address] [facility] [address], patient's son can accompany patient on the trip  Reference # for trip is [id number]  Monday 10/2 from [address] [employer] [address], [address], [address] to [facility] [address], [address], [place],  [address]   time will be at 36AM  Per [employer], patient's son can accompany patient on the trip  Reference # for return trip is #[id number]  [employer] stated that they will find a transportation company to provide the service, but they will not know until the day before what agency it will be  [employer] stated that some companies will knock on the patient's door, but others will only meet patient outside of their home  MSW phoned patient's son, Ingrid Hernandez, to make him aware of above at [phone]  MSW will try to follow-up with [facility] on Friday to see if a company is assigned, if not, this writer will need to follow-up on [place] 10/2  MSW will update patient's son as able

## 2022-09-30 ENCOUNTER — TELEPHONE (OUTPATIENT)
Dept: INTERNAL MEDICINE CLINIC | Facility: CLINIC | Age: 82
End: 2022-09-30

## 2022-09-30 NOTE — TELEPHONE ENCOUNTER
MSW phoned the [facility] at [phone] to check to see if a company was assigned to the 10/3 transport yet  MSW spoke with Sumit White - he stated that the trip has been assigned to [employer] for a wheelchair accessible vehicle  Srini stated that the vehicle will either have a lift/ramp  Srini confirmed the date/times/locations, and did advise that patient's son can accompany her  MSW phoned patient's son, Nick López, at [phone] to make him aware of above  Patient's son confirmed receipt of the wheelchair  Son is aware to be ready for transport by 615AM and will have patient downstairs and will be watching for the   Son is aware that the return trip is scheduled for 830AM to return patient home  Patient's son was directed to contact this writer with any issue with transportation on 10/3 so this writer can follow-up regarding same  Patient's son was in agreement with this plan

## 2022-09-30 NOTE — TELEPHONE ENCOUNTER
----- Message from Lucy Christine sent at 9/30/2022  9:38 AM EDT -----  Regarding: PHYS ORD REQUIRED  Please place an Amb  Ref  To [facility] in [address] [place] [address]  Appt   Date: 10/03/2022    Reasons for Referral : Arterial Ischemic stroke, and MCA (Middle Cerebral Artery)    Thank you

## 2022-10-03 ENCOUNTER — TELEPHONE (OUTPATIENT)
Dept: INTERNAL MEDICINE CLINIC | Facility: CLINIC | Age: 82
End: 2022-10-03

## 2022-10-03 ENCOUNTER — OFFICE VISIT (OUTPATIENT)
Dept: NEUROLOGY | Facility: CLINIC | Age: 82
End: 2022-10-03

## 2022-10-03 VITALS
WEIGHT: 136 LBS | TEMPERATURE: 97.4 F | HEIGHT: 63 IN | HEART RATE: 65 BPM | DIASTOLIC BLOOD PRESSURE: 64 MMHG | SYSTOLIC BLOOD PRESSURE: 142 MMHG | BODY MASS INDEX: 24.1 KG/M2

## 2022-10-03 DIAGNOSIS — Z86.73 HISTORY OF CVA (CEREBROVASCULAR ACCIDENT): Primary | ICD-10-CM

## 2022-10-03 DIAGNOSIS — I10 ESSENTIAL HYPERTENSION: ICD-10-CM

## 2022-10-03 DIAGNOSIS — I48.20 CHRONIC ATRIAL FIBRILLATION (HCC): ICD-10-CM

## 2022-10-03 RX ORDER — DABIGATRAN ETEXILATE 150 MG/1
150 CAPSULE ORAL 2 TIMES DAILY
Qty: 60 CAPSULE | Refills: 0 | Status: SHIPPED | OUTPATIENT
Start: 2022-10-03 | End: 2022-10-10 | Stop reason: SDUPTHER

## 2022-10-03 RX ORDER — DABIGATRAN ETEXILATE 150 MG/1
150 CAPSULE ORAL 2 TIMES DAILY
COMMUNITY
End: 2022-10-03 | Stop reason: SDUPTHER

## 2022-10-03 NOTE — TELEPHONE ENCOUNTER
MSW reviewed chart  Patient got to/from neuro appt this date without issue  MSW reviewed neuro notes - patient was referred to outpatient PT/OT  MSW phoned patient's son - he stated that he was able to get patient scheduled for PT/OT ana at [place] [address] [facility] on 10/10  MSW asked if patient will need transportation to get there  Patient's son stated that he would prefer transportation to be arranged  MSW will request same through [facility], though MSW did advise that patient only has limited visits through them (48 round trips remaining) so MSW suggested that patient apply for [employer]  MSW explained that this is a shared ride, door to door service that is available within [facility] and [facility]  MSW advised that patient will automatically qualify for this service due to being over the age of 72  MSW did advise that patient will need to complete an application and provide a proof of age document (list of acceptible documents is included on application)  MSW did advise that the [place] [employer] covers 85% of the fare and that patient's MA will likely cover the remaining 15%  MSW did reiterate that since [employer] is a shared ride service, that patient will need to allot additional time to get to and from their appointment  MSW did advise that patients will need to schedule transportation at least 2 days in advance, as same day transportation is not available through this service  MSW did provide patient with an application for [employer]  MSW did advise that patient should receive their membership card within 21 days of submitting their application, along with a Marcos Maher Guide which provides details on scheduling rides and hours of operation  MSW will place an application in the mail to patient's home address

## 2022-10-03 NOTE — PROGRESS NOTES
Patient ID: Nas Brown is a 80 y o  female  Assessment:  80year old female with Afib on AC, SSS s/p pacemaker (not MRI compatible), HTN, HLD, presented to [facility] on 8/12/22 with contracted and weak LUE, LLE weakness, L facial droop, mild dysarthria and left sided sensory deficits  She was on Xarelto at the time, reportedly compliant  CTA showed near occlusive thormbus at R MCA bifurcation, as well as mild beading of the mid to distal ICAs suspicious for mild fibromuscular dysplasia, and mild atherosclerotic disease at bilateral distal carotid arteries  She was not a tPA or thrombectomy candidate  Repeat CTH showed subtle loss of gray-white matter differentiation in the right temporal lobe and insular cortex in keeping with right MCA territory infarction  Concern for Xarelto failure, therefore was transitioned to Pradaxa  She went to the [facility], and neurology was re-consulted there due to episodes of increased blinking, head nodding  EEG normal   Neurology did not feel this represented seizure, concern for non-neurologic/behavioral etiology  Also concern baclofen was contributing, therefore it was discontinued  She currently denies any new neurologic symptoms to indicate recurrent TIA/CVA  After the [facility] she went to SNF and just returned home a week ago  She was not given any therapy orders  Son would like outpatient therapy  Will refer to PT and OT at [facility]  We reviewed secondary stroke prevention  She is compliant with the Pradaxa, no issues with bleeding or excessive bruising  She will continue her statin, which she was on before  She should continue to follow with cardiology for Afib management and PCP for management of BP, lipids and blood sugar  She has not seen her PCP and has some non-neurologic concerns  I called the PCP office and requested they call her to schedule a JAMIN appt  She has belching and burping after eating, no dysphagia         Plan:  -for ongoing stroke prevention, continue Pradaxa 150mg twice a day, statin, along with appropriate blood pressure and glycemic control  -will defer management of blood pressure, cholesterol and blood sugar to the PCP  -continue to follow with cardiology for management of atrial fibrillation  -will repeat CTA head and neck in mid-November     -refer to outpatient PT and OT at [address] [facility]   -follow up in 4-6 months or sooner if needed    Signs and symptoms of stroke discussed and included in the AVS   In addition, she was informed that if she were to fall and strike her head, she needs to be seen in the ED urgently for evaluation given her use of anticoagulation  Diagnoses and all orders for this visit:    History of CVA (cerebrovascular accident)  -     Ambulatory Referral to Physical Therapy; Future  -     Ambulatory Referral to Occupational Therapy; Future  -     BUN; Future  -     Creatinine, serum; Future  -     Ambulatory Referral to Neurology    Chronic atrial fibrillation (HCC)  -     dabigatran etexilate (Pradaxa) 150 mg capsu; Take 1 capsule (150 mg total) by mouth 2 (two) times a day    Essential hypertension    Other orders             Subjective:    TYSON    Wisam Tavera is a 80 y o  female with Afib on anticoagulation, sick sinus syndrome s/p pacemaker (not MRI conditional), HTN, and HLD who presents today for a hospital follow up  She is accompanied by her son today who is assisting with translating Romanian  Patient presented to [facility] on 8/12/2022 as a stroke alert for contracted and weak LUE, flaccid LLE, left facial droop, mild dysarthria, and mild sensory deficits on the left side  Patient was on Xarelto for her history of AFib and reported compliance  Her last known well was the night prior before bed  When she woke around 6:30 AM, she noted the left arm was contracted without any movement and the left leg was flaccid  She also had a left facial droop  Per EMS, BP normotensive    Upon arrival to the ED, /80  NIHSS 11  CT head showed focal area of gliosis within the high right postcentral gyrus and scattered chronic microvascular ischemic changes with more focal lucency along the anterior limb of the right internal capsule, but no acute hemorrhage  CTA head/neck showed near occlusive thrombus at the right MCA bifurcation, mild beading of the mid to distal ICAs suspicious for mild fibromuscular dysplasia, and mild atherosclerotic disease at bilateral distal carotid arteries  CT cerebral perfusion showed 8 mL mismatch  Patient was not a tPA candidate due to being on anticoagulation  Attending Neurologist discussed case with Dr Lisette Johnson with IR Neurosurgery, and patient was deemed not a thrombectomy candidate  Patient was started on stroke protocol heparin gtt  Repeat CTH revealed subtle loss of gray-white matter differentiation in the right temporal lobe and insular cortex in keeping with right MCA territory infarction  Her pacemaker is not MRI compatible  A1C 5 4, lipid panel , LDL 38  ECHO with EF 65%, moderate left atrial dilation, mild right atrial dilation  Repeat CTA on 8/15 showed evolving right MCA distribution infarct  No evidence for secondary parenchymal hematoma  Interval recanalization of previously seen thrombus at the right MCA bifurcation  Middle cerebral arteries are patent on the current exam without evidence for major branch vessel occlusion  Stable beading of the mid to distal internal carotid arteries again suggestive of mild fibromuscular dysplasia  She was transitioned from Xarelto to Pradaxa  She was transferred to the [facility] on 8/19/22  Neurology was consulted on 9/7/22 while patient was in the [facility] for episodes of head shaking, intermittent nonsensical word substitution, atypical frequent rapid blinking, increased tremulousness  She was noted to have episodes on 9/2, 9/5, and overnight 9/6-9/7   In discussion with patient, she reported maintaining awareness throughout the episodes and is able to recall the episodes  There was low suspicion for seizure, however routine EEG was ordered and completed 9/8/22, and this was normal   Repeat CTH with no new findings  Etiology of these episodes were felt to be behavioral and non-neurologic  There was also concern baclofen was contributing to some of this presentation, therefore it was discontinued  She was eventually discharged from the [facility] on 9/13/22 and went to [address] [place] [address] Drive  She was discharged home on 9/24/22  Today, patient reports she is overall doing ok  Her son is staying with her  She is not currently getting any therapies  Her son said nothing was ordered at discharge  Her son would like outpatient therapies for her  He will transport her  Patient reports belching after eating  No difficulty swallowing  She reports ongoing left sided weakness, stiffness, and swelling of the LLE  She has not yet seen her PCP since discharge  She denies any difficulty sleeping, appetite is good  Son feels mood is "ok", patient agrees  She is happy to be home  The following portions of the patient's history were reviewed and updated as appropriate: current medications, past family history, past medical history, past social history, past surgical history and problem list          Objective:    Blood pressure 142/64, pulse 65, temperature (!) 97 4 °F (36 3 °C), temperature source Skin, height 5' 3" (1 6 m), weight 61 7 kg (136 lb)  Physical Exam  Constitutional:       Appearance: Normal appearance  HENT:      Head: Normocephalic and atraumatic  Eyes:      Extraocular Movements: EOM normal       Pupils: Pupils are equal, round, and reactive to light  Musculoskeletal:      Left lower leg: Edema present  Neurological:      Mental Status: She is alert  Coordination: Coordination is intact        Deep Tendon Reflexes:      Reflex Scores:       Bicep reflexes are 2+ on the right side and 3+ on the left side        Brachioradialis reflexes are 2+ on the right side and 3+ on the left side  Patellar reflexes are 2+ on the right side and 3+ on the left side  Achilles reflexes are 2+ on the right side and 3+ on the left side  Psychiatric:         Mood and Affect: Mood normal          Speech: Speech normal          Behavior: Behavior normal          Neurological Exam  Mental Status  Alert  Oriented to person, place, time and situation  Speech is normal  Language is fluent with no aphasia  Attention and concentration are normal     Cranial Nerves  CN II: Visual fields full to confrontation  CN III, IV, VI: Extraocular movements intact bilaterally  Pupils equal round and reactive to light bilaterally  CN V: Facial sensation is normal   CN VII: Full and symmetric facial movement  CN VIII: Hearing is normal   CN IX, X: Palate elevates symmetrically  CN XI: Shoulder shrug strength is normal   CN XII: Tongue midline without atrophy or fasciculations  Motor   Increased muscle tone  On the left  No abnormal involuntary movements  Right                     Left   Shoulder abduction               5                          5-  Elbow flexion                         5                          4  Elbow extension                    5                          4  Hip flexion                              5-                          2  Plantarflexion                         5                          2  Dorsiflexion                            5                          2    Sensory  Light touch abnormality: Reports decrease on the left side both UE and LE       Reflexes                                            Right                      Left  Brachioradialis                    2+                         3+  Biceps                                 2+                         3+  Patellar                                2+                         3+  Achilles 2+                         3+    Left pathological reflexes: Ankle clonus present  Coordination    Finger-to-nose, rapid alternating movements and heel-to-shin normal bilaterally without dysmetria  Gait    Deferred, patient in a WC         ROS:    Review of Systems   Constitutional: Negative  Negative for appetite change and fever  HENT: Negative  Negative for hearing loss, tinnitus, trouble swallowing and voice change  Eyes: Negative  Negative for photophobia and pain  Respiratory: Positive for cough  Negative for shortness of breath  Burping after eats sometimes and dry mouth   Cardiovascular: Negative  Negative for palpitations  Gastrointestinal: Negative  Negative for nausea and vomiting  Endocrine: Positive for cold intolerance  Genitourinary: Negative  Negative for dysuria, frequency and urgency  Musculoskeletal: Negative  Negative for myalgias and neck pain  Skin: Negative  Negative for rash  Neurological: Positive for tremors (left leg), weakness (left leg) and numbness (left leg and swollen)  Negative for dizziness, seizures, syncope, facial asymmetry, speech difficulty, light-headedness and headaches  Hematological: Negative  Does not bruise/bleed easily  Psychiatric/Behavioral: Negative  Negative for confusion, hallucinations and sleep disturbance       I personally reviewed and updated the ROS as appropriate

## 2022-10-03 NOTE — PATIENT INSTRUCTIONS
For ongoing stroke prevention, continue Pradaxa 150mg twice a day, statin, along with appropriate blood pressure and glycemic control  Continue to follow closely with PCP for management of blood pressure, cholesterol and blood sugar  Continue to follow with cardiology for management of atrial fibrillation  Will repeat CTA head and neck in mid-November  You will have to get labs done to check kidney function before this test  Will refer to outpatient PT and OT at [address] [place] [address]   Follow up in 4-6 months or sooner if needed    If you experience any facial droop, weakness on one side of the body, speech or swallowing difficulty, painless loss of vision in one eye, double vision, vertigo that does not resolve quickly/imbalance, go to the ER/call 911  In addition, if you were to have a fall and strike your head, you should be seen in the ER urgently for evaluation due to your use of anticoagulation

## 2022-10-03 NOTE — TELEPHONE ENCOUNTER
Call received from [facility]) to make PCP aware that patient recently had a stroke and now has severe Neurological issues  However at the time of their appointment she states patient had a lot of concerns and she would like us to f/u with the patient  Call made out to Donal (patients son) to schedule a TCM as patient was d/c on  09/24/22 from rehab  Clinical please reach out to patient to further f/u

## 2022-10-04 NOTE — TELEPHONE ENCOUNTER
Govind Sylvester application and proof of age document placed in the mail to patient's home address this date  MSW phoned the [facility] at [phone] and spoke with Parker Ramires  MSW requested transportation for patient's PT/OT appts on Monday 10/10  Parker Ramires put in a request for patient to be picked up at [address] [facility] from [address] [place], [place] to go to [address], [place]  Benoit requested a return ride at 1030AM to return patient home  MSW requested a wheelchair accessible [place] [employer] and for patient's son to accompany patient on the ride  Parker Ramires stated that this writer can call back on 10/7 or 10/9 to see if a transportation company has been assigned yet  MSW phoned patient's son to make him aware of above at [phone]

## 2022-10-05 ENCOUNTER — OFFICE VISIT (OUTPATIENT)
Dept: INTERNAL MEDICINE CLINIC | Facility: CLINIC | Age: 82
End: 2022-10-05

## 2022-10-05 ENCOUNTER — DOCUMENTATION (OUTPATIENT)
Dept: INTERNAL MEDICINE CLINIC | Facility: CLINIC | Age: 82
End: 2022-10-05

## 2022-10-05 VITALS
BODY MASS INDEX: 24.37 KG/M2 | DIASTOLIC BLOOD PRESSURE: 63 MMHG | HEART RATE: 64 BPM | WEIGHT: 137.6 LBS | OXYGEN SATURATION: 99 % | SYSTOLIC BLOOD PRESSURE: 142 MMHG | TEMPERATURE: 97.9 F

## 2022-10-05 DIAGNOSIS — Z23 NEED FOR INFLUENZA VACCINATION: Primary | ICD-10-CM

## 2022-10-05 DIAGNOSIS — R14.2 BELCHING: ICD-10-CM

## 2022-10-05 DIAGNOSIS — E53.8 VITAMIN B12 DEFICIENCY: ICD-10-CM

## 2022-10-05 DIAGNOSIS — I63.30 CEREBRAL THROMBOSIS WITH CEREBRAL INFARCTION (HCC): ICD-10-CM

## 2022-10-05 DIAGNOSIS — Z86.73 HISTORY OF CVA (CEREBROVASCULAR ACCIDENT): ICD-10-CM

## 2022-10-05 DIAGNOSIS — K21.9 GASTROESOPHAGEAL REFLUX DISEASE WITHOUT ESOPHAGITIS: Chronic | ICD-10-CM

## 2022-10-05 DIAGNOSIS — R60.9 SWELLING: ICD-10-CM

## 2022-10-05 PROCEDURE — 3078F DIAST BP <80 MM HG: CPT | Performed by: INTERNAL MEDICINE

## 2022-10-05 PROCEDURE — 99215 OFFICE O/P EST HI 40 MIN: CPT | Performed by: INTERNAL MEDICINE

## 2022-10-05 PROCEDURE — 3077F SYST BP >= 140 MM HG: CPT | Performed by: INTERNAL MEDICINE

## 2022-10-05 PROCEDURE — 90471 IMMUNIZATION ADMIN: CPT | Performed by: INTERNAL MEDICINE

## 2022-10-05 PROCEDURE — 90662 IIV NO PRSV INCREASED AG IM: CPT | Performed by: INTERNAL MEDICINE

## 2022-10-05 PROCEDURE — 96372 THER/PROPH/DIAG INJ SC/IM: CPT | Performed by: INTERNAL MEDICINE

## 2022-10-05 PROCEDURE — G0008 ADMIN INFLUENZA VIRUS VAC: HCPCS | Performed by: INTERNAL MEDICINE

## 2022-10-05 RX ORDER — CYANOCOBALAMIN 1000 UG/ML
1000 INJECTION, SOLUTION INTRAMUSCULAR; SUBCUTANEOUS
Status: CANCELLED | OUTPATIENT
Start: 2022-10-16

## 2022-10-05 RX ORDER — CYANOCOBALAMIN 1000 UG/ML
1000 INJECTION, SOLUTION INTRAMUSCULAR; SUBCUTANEOUS
Status: DISCONTINUED | OUTPATIENT
Start: 2022-11-05 | End: 2022-10-05

## 2022-10-05 RX ORDER — CYANOCOBALAMIN 1000 UG/ML
1000 INJECTION, SOLUTION INTRAMUSCULAR; SUBCUTANEOUS
Status: SHIPPED | OUTPATIENT
Start: 2022-11-05

## 2022-10-05 RX ORDER — SIMETHICONE 125 MG
125 CAPSULE ORAL
Qty: 28 CAPSULE | Refills: 0 | Status: SHIPPED | OUTPATIENT
Start: 2022-10-05

## 2022-10-05 RX ORDER — OMEPRAZOLE 20 MG/1
20 CAPSULE, DELAYED RELEASE ORAL
Qty: 30 CAPSULE | Refills: 2 | Status: CANCELLED | OUTPATIENT
Start: 2022-10-05 | End: 2022-11-04

## 2022-10-05 RX ADMIN — CYANOCOBALAMIN 1000 MCG: 1000 INJECTION, SOLUTION INTRAMUSCULAR; SUBCUTANEOUS at 11:17

## 2022-10-05 NOTE — ASSESSMENT & PLAN NOTE
Patient has been experiencing belching since her stroke  Patient notes belching when drinking cold liquid and randomly  Neuro is aware and mentioned GERD  Speech did see her in the hospital and did a Barium swallow which noted no significant findings, but did note to follow with GI if symptoms persisted      -Currently on Protonix 40 daily  -Added Simethicone before meals  -Will follow in 6 weeks

## 2022-10-05 NOTE — PROGRESS NOTES
[facility]  INTERNAL MEDICINE OFFICE VISIT     PATIENT INFORMATION     Rafia Breath   80 y o  female   MRN: [id number]    ASSESSMENT/PLAN     Problem List Items Addressed This Visit        Digestive    Gastroesophageal reflux disease without esophagitis (Chronic)     Patient presents with belching symptoms that have occured since her CVA  Episodes are whenever drinking something cold and can occur randomly  Patient is current on protonix     -Continue protonix 40 daily  -Simethacone 125 before meals  -Will follow in 6 weeks            Cardiovascular and Mediastinum    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 [facility])     Patient recently discharged from rehab center due to CVA that occurred in 8/2022  Patient is feeling well with some residula effects on her L side mainly her L leg  Patient does note increased swelling in her legs that has been getting worse     -Currently on Pradaxa as Xarelto failed   -Duplex ordered of lower extremities   -Neuro following outpatient  -OT/PT starting next week            Other    History of CVA (cerebrovascular accident)    Relevant Medications    Incontinence Supply Disposable (RA Adult Wipes) MISC    Other Relevant Orders    Commode chair    Low Air Loss Alternating Pressure Pads    Belching     Patient has been experiencing belching since her stroke  Patient notes belching when drinking cold liquid and randomly  Neuro is aware and mentioned GERD  Speech did see her in the hospital and did a Barium swallow which noted no significant findings, but did note to follow with GI if symptoms persisted      -Currently on Protonix 40 daily  -Added Simethicone before meals  -Will follow in 6 weeks         Relevant Medications    simethicone (MYLICON,GAS-X) 605 MG CAPS      Other Visit Diagnoses     Need for influenza vaccination    -  Primary    Relevant Orders    influenza vaccine, high-dose, PF 0 7 mL (FLUZONE HIGH-DOSE) (Completed) Swelling        Relevant Orders    VAS lower limb venous duplex study, complete bilateral    Vitamin B12 deficiency            Schedule a follow-up appointment in 6 weeks  HEALTH MAINTENANCE     Immunization History   Administered Date(s) Administered    COVID-19 MODERNA VACC 0 5 ML IM 02/19/2021, 05/27/2021    COVID-19 Pfizer Vac BIVALENT Jermaine-sucrose 12 Yr+ IM (BOOSTER ONLY) 09/09/2022    INFLUENZA 10/11/2005, 10/24/2006, 10/15/2007, 09/28/2015, 10/06/2016, 10/18/2017    Influenza Quadrivalent Preservative Free 3 years and older IM 10/06/2016    Influenza Quadrivalent, 6-35 Months IM 10/18/2017    Influenza, high dose seasonal 0 7 mL 10/12/2018, 09/23/2019, 09/23/2020, 09/22/2021, 10/05/2022    Influenza, seasonal, injectable 09/24/2013, 09/24/2014, 09/28/2015    Pneumococcal Conjugate 13-Valent 05/31/2016    Pneumococcal Polysaccharide PPV23 01/01/2001, 12/18/2019    Tdap 03/12/2014     CHIEF COMPLAINT     Chief Complaint   Patient presents with    Transition of Care Management      HISTORY OF PRESENT ILLNESS   Patient is an 81 y/o PMH HTN, Afib, R MCA CVA, CKD 3 presenting for TCM visit  Patient was admitted to the hospital at the beginning of August for an R MCA stroke with residual deficits on her left side  Patient was originally on Xarelto before the stroke and transitioned to Pradaxa after the stroke  Patient was then sent to a rehab center and later discharged home with a caretaker  Patient's only complaint is belching episodes that she has been having since her stroke  Speech saw her in the hospital and did a Barium swallow and was found to be WNL  Speech did note if symptoms continue to see GI  Patient does endorse the symptoms and states they are worse with drinking cold liquids but do occur randomly  Patient's son was in the room and is currently getting therapy started next Monday with OT and PT           REVIEW OF SYSTEMS     Review of Systems   Constitutional: Negative for chills and fever  HENT: Negative for ear pain and sore throat  Eyes: Negative for pain and visual disturbance  Respiratory: Negative for cough and shortness of breath  Cardiovascular: Negative for chest pain and palpitations  Gastrointestinal: Negative for abdominal pain and vomiting  Genitourinary: Negative for dysuria and hematuria  Musculoskeletal: Negative for arthralgias and back pain  Skin: Negative for color change and rash  Neurological: Positive for weakness  Negative for seizures, syncope and headaches  All other systems reviewed and are negative  OBJECTIVE     Vitals:    10/05/22 0958   BP: 142/63   BP Location: Right arm   Patient Position: Sitting   Cuff Size: Standard   Pulse: 64   Temp: 97 9 °F (36 6 °C)   TempSrc: Temporal   SpO2: 99%   Weight: 62 4 kg (137 lb 9 6 oz)     Physical Exam  Vitals and nursing note reviewed  Constitutional:       General: She is not in acute distress  Appearance: She is well-developed  HENT:      Head: Normocephalic and atraumatic  Eyes:      Conjunctiva/sclera: Conjunctivae normal    Cardiovascular:      Rate and Rhythm: Normal rate and regular rhythm  Heart sounds: No murmur heard  Pulmonary:      Effort: Pulmonary effort is normal  No respiratory distress  Breath sounds: Normal breath sounds  Abdominal:      Palpations: Abdomen is soft  Tenderness: There is no abdominal tenderness  Musculoskeletal:      Cervical back: Neck supple  Right lower leg: Edema present  Left lower leg: Edema present  Skin:     General: Skin is warm and dry  Neurological:      Mental Status: She is alert and oriented to person, place, and time  Motor: Weakness (LLE 2/5) present         CURRENT MEDICATIONS     Current Outpatient Medications:     dabigatran etexilate (Pradaxa) 150 mg capsu, Take 1 capsule (150 mg total) by mouth 2 (two) times a day, Disp: 60 capsule, Rfl: 0    Incontinence Supply Disposable (RA Adult Wipes) MISC, Use 4 (four) times a day, Disp: 64 each, Rfl: 10    losartan (COZAAR) 50 mg tablet, Take 1 tablet (50 mg total) by mouth daily, Disp: , Rfl: 0    nebivolol (BYSTOLIC) 5 mg tablet, Take 1 tablet (5 mg total) by mouth daily, Disp: 90 tablet, Rfl: 3    pantoprazole (PROTONIX) 40 mg tablet, Take 1 tablet (40 mg total) by mouth daily in the early morning, Disp: , Rfl: 0    rosuvastatin (CRESTOR) 10 MG tablet, Take 0 5 tablets (5 mg total) by mouth daily, Disp: , Rfl: 0    simethicone (MYLICON,GAS-X) 297 MG CAPS, Take 1 capsule (125 mg total) by mouth 3 (three) times a day before meals, Disp: 28 capsule, Rfl: 0    tamsulosin (FLOMAX) 0 4 mg, Take 1 capsule (0 4 mg total) by mouth daily with dinner, Disp: , Rfl: 0    verapamil (CALAN-SR) 240 mg CR tablet, TAKE 1 TABLET (240 MG TOTAL) BY MOUTH DAILY AT BEDTIME, Disp: 90 tablet, Rfl: 3    acetaminophen (TYLENOL) 325 mg tablet, Take 2 tablets (650 mg total) by mouth every 6 (six) hours as needed for mild pain (Patient not taking: No sig reported), Disp: , Rfl: 0    benzonatate (TESSALON PERLES) 100 mg capsule, Take 2 capsules (200 mg total) by mouth 3 (three) times a day as needed for cough (Patient not taking: No sig reported), Disp: , Rfl:     bisacodyl (DULCOLAX) 10 mg suppository, Insert 1 suppository (10 mg total) into the rectum daily as needed (Constipation   Second line and refer to bowel protocol) (Patient not taking: No sig reported), Disp: 12 suppository, Rfl: 0    Blood Pressure Monitoring (Blood Pressure Cuff) MISC, Use every other day For HTN (Patient not taking: No sig reported), Disp: 1 each, Rfl: 0    dabigatran etexilate (PRADAXA) 150 mg capsu, Take 1 capsule (150 mg total) by mouth 2 (two) times a day PRICE CHECK DO NOT FILL (Patient not taking: Reported on 10/5/2022), Disp: 60 capsule, Rfl: 0    guaiFENesin (MUCINEX) 600 mg 12 hr tablet, Take 1 tablet (600 mg total) by mouth every 12 (twelve) hours (Patient not taking: No sig reported), Disp: , Rfl: 0    magnesium oxide (MAG-OX) 400 mg, TAKE 1 TABLET (400 MG TOTAL) BY MOUTH DAILY (Patient not taking: No sig reported), Disp: 30 tablet, Rfl: 2    melatonin 3 mg, Take 2 tablets (6 mg total) by mouth every evening (Patient not taking: No sig reported), Disp: , Rfl: 0    menthol-methyl salicylate (BENGAY) 89-55 % cream, Apply topically 2 (two) times a day (Patient not taking: No sig reported), Disp: , Rfl: 0    nitroglycerin (NITROSTAT) 0 4 mg SL tablet, Place 1 tablet (0 4 mg total) under the tongue every 5 (five) minutes as needed for chest pain (Patient not taking: No sig reported), Disp: , Rfl: 0    phenol (CHLORASEPTIC) 1 4 % mucosal liquid, Apply 1 spray to the mouth or throat every 2 (two) hours as needed (cough) (Patient not taking: No sig reported), Disp: , Rfl: 0    polyethylene glycol (MIRALAX) 17 g packet, Take 17 g by mouth daily as needed (First line and refer to bowel protocol) (Patient not taking: No sig reported), Disp: , Rfl: 0    polyvinyl alcohol (LIQUIFILM TEARS) 1 4 % ophthalmic solution, Administer 1 drop to the right eye as needed for dry eyes (Patient not taking: No sig reported), Disp: 15 mL, Rfl: 0    Current Facility-Administered Medications:     cyanocobalamin injection 1,000 mcg, 1,000 mcg, Intramuscular, Q30 Days, Shaw Ritchie MD, 1,000 mcg at 10/05/22 1117    PAST MEDICAL & SURGICAL HISTORY     Past Medical History:   Diagnosis Date    A-fib ([facility] [address] )     Anemia     Arthritis     Breast pain, right     Chest pain on breathing     Colon polyp     Cough     Diverticulosis     Encounter for screening colonoscopy     H/O sick sinus syndrome     Heart murmur     High cholesterol     History of atrial fibrillation     Rate ontrolled  On xarelto 15mg (creatinine 50) Followed by Dr Shannon Silva with Cardiology     History of weight loss     Report loose fitting clothes  Weight stable (3lbs difference)  Last colo showed small tubular adenoma x2   Breast biopsy last showed fibrocystic changes  TSH wnl  Will check cbc, bmp, spep   Hx of long term use of blood thinners     Hypertension     Irregular heart beat     Pacemaker     Preop examination     Shortness of breath     Viral bronchitis      Past Surgical History:   Procedure Laterality Date    BREAST BIOPSY Right 08/17/2006    ultrasound guided Percutaneous Needle Core -Benign    CATARACT EXTRACTION      CATARACT EXTRACTION W/ INTRAOCULAR LENS  IMPLANT, BILATERAL      COLONOSCOPY      COLONOSCOPY N/A 5/22/2018    Procedure: COLONOSCOPY;  Surgeon: Gerald Gonzalez DO;  [facility]; Service: Gastroenterology    Jennifer Kwong / Timi Hamilton / [place] Walker Right     as a child after a fall    MAMMO STEREOTACTIC BREAST BIOPSY RIGHT (ALL INC) Right 10/2014    benign    CT CMBND ANTERPOST COLPORRAPHY W/CYSTO N/A 3/17/2016    Procedure: COLPORRHAPHY ANTERIOR POSTERIOR ;  Surgeon: Taryn Bowman MD;  [facility];  Service: Gynecology    CT COLONOSCOPY FLX DX W/[facility] WHEN PFRMD N/A 4/4/2019    Procedure: COLONOSCOPY;  Surgeon: Gerald Gonzalez DO;  [facility]; Service: Gastroenterology    CT CYSTOURETHROSCOPY N/A 3/17/2016    Procedure: CYSTOSCOPY;  Surgeon: Taryn Bowman MD;  [facility];  Service: Gynecology    CT ESOPHAGOGASTRODUODENOSCOPY TRANSORAL DIAGNOSTIC N/A 4/16/2018    Procedure: EGD AND COLONOSCOPY;  Surgeon: Gerald Gonzalez DO;  [facility];   Service: Gastroenterology    CT LAP,SURG,COLECTOMY, PARTIAL, W/ANAST Right 1/13/2022    Procedure: Diagnostic laparoscopy, laparscopic right colectomy;  Surgeon: Ashley Bishop MD;  [facility];  Service: Colorectal    CT REVAGINAL PROLAPSE,SACROSP LIG N/A 3/17/2016    Procedure: COLPOPEXY VAGINAL EXTRAPERITONEAL (VEC) ANTERIOR ;  Surgeon: Taryn Bowman MD;  [facility];  Service: Gynecology    CT SLING OPER STRES INCONTINENCE N/A 3/17/2016 Procedure: INSERTION PUBOVAGINAL SLING SINGLE INCISION ;  Surgeon: Carmen Pickard MD;  [facility];  Service: Gynecology     SOCIAL & FAMILY HISTORY     Social History     Socioeconomic History    Marital status:      Spouse name: Not on file    Number of children: Not on file    Years of education: Not on file    Highest education level: Not on file   Occupational History    Occupation: retired   Tobacco Use    Smoking status: Never Smoker    Smokeless tobacco: Never Used   Vaping Use    Vaping Use: Never used   Substance and Sexual Activity    Alcohol use: Never    Drug use: No    Sexual activity: Not Currently     Comment:    Other Topics Concern    Not on file   Social History Narrative    Housing, household, and economic circumstances      Social Determinants of Health     Financial Resource Strain: Low Risk     Difficulty of Paying Living Expenses: Not hard at all   Food Insecurity: No Food Insecurity    Worried About [address] in the Last Year: Never true    [address] [employer] in the Last Year: Never true   Transportation Needs: No Transportation Needs    Lack of Transportation (Medical): No    Lack of Transportation (Non-Medical):  No   Physical Activity: Not on file   Stress: Not on file   Social Connections: Not on file   Intimate Partner Violence: Not on file   Housing Stability: Low Risk     Unable to Pay for Housing in the Last Year: No    Number of Places Lived in the Last Year: 1    Unstable Housing in the Last Year: No     Social History     Substance and Sexual Activity   Alcohol Use Never       Social History     Substance and Sexual Activity   Drug Use No     Social History     Tobacco Use   Smoking Status Never Smoker   Smokeless Tobacco Never Used     Family History   Problem Relation Age of Onset    Diabetes Mother     Breast cancer Sister 48    No Known Problems Father     No Known Problems Maternal Grandmother     No Known Problems Maternal Grandfather     No Known Problems Paternal Grandmother     No Known Problems Paternal Grandfather     No Known Problems Daughter     No Known Problems Son     No Known Problems Sister     No Known Problems Sister     No Known Problems Brother     No Known Problems Brother     No Known Problems Sister     No Known Problems Paternal Aunt     No Known Problems Paternal Aunt     No Known Problems Paternal Aunt     No Known Problems Paternal Aunt      ==  Jamin Santos MD  PGY-1  Jessica Schw[facility] Internal Medicine [facility] [address]   [facility] , [address] [address] [facility] [address] [facility]  Office: [phone]  Fax: [phone]

## 2022-10-05 NOTE — ASSESSMENT & PLAN NOTE
Patient presents with belching symptoms that have occured since her CVA  Episodes are whenever drinking something cold and can occur randomly   Patient is current on protonix     -Continue protonix 40 daily  -Simethacone 125 before meals  -Will follow in 6 weeks

## 2022-10-05 NOTE — ASSESSMENT & PLAN NOTE
Patient recently discharged from rehab center due to CVA that occurred in 8/2022  Patient is feeling well with some residula effects on her L side mainly her L leg   Patient does note increased swelling in her legs that has been getting worse     -Currently on Pradaxa as Xarelto failed   -Duplex ordered of lower extremities    -Neuro following outpatient  -OT/PT starting next week

## 2022-10-07 ENCOUNTER — TELEPHONE (OUTPATIENT)
Dept: INTERNAL MEDICINE CLINIC | Facility: CLINIC | Age: 82
End: 2022-10-07

## 2022-10-07 DIAGNOSIS — Z86.73 HISTORY OF CVA (CEREBROVASCULAR ACCIDENT): ICD-10-CM

## 2022-10-07 LAB
DME PARACHUTE DELIVERY DATE REQUESTED: NORMAL
DME PARACHUTE ITEM DESCRIPTION: NORMAL
DME PARACHUTE ORDER STATUS: NORMAL
DME PARACHUTE SUPPLIER NAME: NORMAL
DME PARACHUTE SUPPLIER PHONE: NORMAL

## 2022-10-07 NOTE — TELEPHONE ENCOUNTER
Called patient, spoke to daughter to clarify which pads is referring  Patient daughter stated low air loss alternating pressure pad   Script written and waiting for attending to sign to fax over

## 2022-10-07 NOTE — TELEPHONE ENCOUNTER
Staci Dallas from [facility] called stating she needs a signature to deliver the commode  They received the order on parachute but they need a signature please correct and resend

## 2022-10-07 NOTE — TELEPHONE ENCOUNTER
Donal called regarding patients Commode, Pads, and Wipes  They were unsure where to get it from  Please send to correct DME pharmacy for patient  We did give patient [address] DME phone number  Please let patient know where we she can  these items  Dr Rosalie Garcia ordered everything at the 10/5/22 appointment

## 2022-10-07 NOTE — TELEPHONE ENCOUNTER
MSW phoned the [employer] at [phone] and spoke with Erasmo  A transportation company was assigned - [employer] with a wheelchair accessible [address]   to get to appt is 7AM and return trip to home is scheduled for Yuliya Willard provided the phone number for [employer] as [phone] and the number for the , [employer], as [phone]  MSW phoned patient's son, Antonio Andrade, to make him aware of above at [phone]

## 2022-10-10 ENCOUNTER — EVALUATION (OUTPATIENT)
Dept: PHYSICAL THERAPY | Facility: CLINIC | Age: 82
End: 2022-10-10
Payer: MEDICARE

## 2022-10-10 ENCOUNTER — EVALUATION (OUTPATIENT)
Dept: OCCUPATIONAL THERAPY | Facility: CLINIC | Age: 82
End: 2022-10-10
Payer: MEDICARE

## 2022-10-10 DIAGNOSIS — Z74.09 IMPAIRED FUNCTIONAL MOBILITY, BALANCE, GAIT, AND ENDURANCE: Primary | ICD-10-CM

## 2022-10-10 DIAGNOSIS — I10 HYPERTENSION, UNSPECIFIED TYPE: ICD-10-CM

## 2022-10-10 DIAGNOSIS — E78.5 HYPERLIPIDEMIA LDL GOAL <100: ICD-10-CM

## 2022-10-10 DIAGNOSIS — K21.9 GASTROESOPHAGEAL REFLUX DISEASE WITHOUT ESOPHAGITIS: Chronic | ICD-10-CM

## 2022-10-10 DIAGNOSIS — Z86.73 HISTORY OF CVA (CEREBROVASCULAR ACCIDENT): ICD-10-CM

## 2022-10-10 DIAGNOSIS — I48.20 CHRONIC ATRIAL FIBRILLATION (HCC): ICD-10-CM

## 2022-10-10 DIAGNOSIS — Z86.73 HISTORY OF CVA (CEREBROVASCULAR ACCIDENT): Primary | ICD-10-CM

## 2022-10-10 DIAGNOSIS — R33.9 URINARY RETENTION: ICD-10-CM

## 2022-10-10 DIAGNOSIS — I10 ESSENTIAL HYPERTENSION: ICD-10-CM

## 2022-10-10 DIAGNOSIS — Z74.1 NEED FOR ASSISTANCE WITH PERSONAL CARE: ICD-10-CM

## 2022-10-10 PROCEDURE — 97166 OT EVAL MOD COMPLEX 45 MIN: CPT

## 2022-10-10 PROCEDURE — 97112 NEUROMUSCULAR REEDUCATION: CPT

## 2022-10-10 PROCEDURE — 97162 PT EVAL MOD COMPLEX 30 MIN: CPT

## 2022-10-10 RX ORDER — ROSUVASTATIN CALCIUM 10 MG/1
5 TABLET, COATED ORAL DAILY
Qty: 15 TABLET | Refills: 2 | Status: SHIPPED | OUTPATIENT
Start: 2022-10-10

## 2022-10-10 RX ORDER — TAMSULOSIN HYDROCHLORIDE 0.4 MG/1
0.4 CAPSULE ORAL
Qty: 30 CAPSULE | Refills: 2 | Status: SHIPPED | OUTPATIENT
Start: 2022-10-10

## 2022-10-10 RX ORDER — LOSARTAN POTASSIUM 50 MG/1
50 TABLET ORAL DAILY
Qty: 30 TABLET | Refills: 2 | Status: SHIPPED | OUTPATIENT
Start: 2022-10-10

## 2022-10-10 RX ORDER — VERAPAMIL HYDROCHLORIDE 240 MG/1
240 TABLET, FILM COATED, EXTENDED RELEASE ORAL
Qty: 90 TABLET | Refills: 3 | Status: SHIPPED | OUTPATIENT
Start: 2022-10-10

## 2022-10-10 RX ORDER — DABIGATRAN ETEXILATE 150 MG/1
150 CAPSULE ORAL 2 TIMES DAILY
Qty: 60 CAPSULE | Refills: 2 | Status: SHIPPED | OUTPATIENT
Start: 2022-10-10

## 2022-10-10 RX ORDER — NEBIVOLOL 5 MG/1
5 TABLET ORAL DAILY
Qty: 90 TABLET | Refills: 3 | Status: SHIPPED | OUTPATIENT
Start: 2022-10-10

## 2022-10-10 RX ORDER — PANTOPRAZOLE SODIUM 40 MG/1
40 TABLET, DELAYED RELEASE ORAL
Qty: 30 TABLET | Refills: 2 | Status: SHIPPED | OUTPATIENT
Start: 2022-10-10

## 2022-10-10 NOTE — TELEPHONE ENCOUNTER
Ken Landing from [facility] called a second time regarding this  Please correct signature so they can process script

## 2022-10-10 NOTE — PROGRESS NOTES
PT Evaluation     Today's date: 10/10/2022  Patient name: Sukumar Burgos  : 1940  MRN: [id number]  Referring provider: David Quiroz PA-C  Dx:   Encounter Diagnosis     ICD-10-CM    1  Impaired functional mobility, balance, gait, and endurance  Z74 09    2  History of CVA (cerebrovascular accident)  Z86 73 Ambulatory Referral to Physical Therapy                  Assessment  Assessment details: Patient presents to outpatient physical therapy s/p CVA on 22, post inpatient rehabilitation and SNF stay  She presents with impaired L LE strength,  Increased L LE tone and spasticity, decreased endurance, decreased balance, increased fall risk, and requiring assistance for mobility tasks (gait, transfers, bed mobility)  She presents with increased fall risk from increased time to complete 5xSTS of 27 6 seconds, above cut-off score of 15 seconds  She also presents with decreased gait speed of 0 30 m/s, below cut-off score of 1 0m/s for increased risk of falls, and also is below cut-off score for needing assist with ADL's and IADLS (8 7X/R cut-off score), is more likely to be hospitalized (0 6m/s cut-off), and is considered a household walker  She is also considered a fall risk with TUG score of 54 4 seconds, above cut-off score of 13 5 seconds for fall risk  She demonstrates decreased LE strength and endurance from 5xSTS of 27 65 seconds (with min A due to retropulsion)  She also demonstrates decreased functional mobility and endurance with 2MWT of 110 feet, and able to ambulate 130 feet in total before requiring rest break  She would benefit from skilled physical therapy to decrease fall risk, improve balance, improve LE strength and endurance, improve cardiovascular endurance, improve functional mobility, decreased caregiver burden and maximize function    Impairments: abnormal gait, abnormal muscle tone, abnormal or restricted ROM, activity intolerance, impaired balance, impaired physical strength, pain with function and safety issue  Understanding of Dx/Px/POC: good   Prognosis: good    Goals  ST  Pt will improve gait speed to at least 0 40 m/s with least restrictive device within 4 weeks needed to improve safety with ambulation  2  Pt will improve TUG time by at least 5 seconds to decrease fall risk and improve mobility in 4 weeks  3  Pt will improve 5xSTS score by at least 5 seconds within 4 weeks needed to reduce fall risk  4  Pt will improve 6MWT to at least 250 feet in 4 week to improve functional mobility and endurance in 4 weeks  5  Pt will improve bed mobility and transfers to [facility] to min A at most in 4 weeks to improve functional mobility and decrease caregiver burden in 4 weeks  LT  Pt will improve gait speed to at least 0 70 m/s with least restrictive device within 12 weeks needed to improve safety with ambulation  2  Pt will improve TUG score to <20 seconds in 12 weeks to decrease fall risk and improve functional mobility  3  Pt will improve 5xSTS score to <15 sec with SPV within 12 weeks needed to reduce fall risk  4  Pt will improve 6MWT to at least 500 feet in 12 weeks to improve functional moblity  5  Pt will demonstrate independence with HEP within 12 weeks  Plan  Plan details: 2-3x/week per patient tolerance    Patient would benefit from: skilled physical therapy  Planned therapy interventions: balance, balance/weight bearing training, neuromuscular re-education, motor coordination training, orthotic fitting/training, orthotic management and training, patient education, coordination, strengthening, stretching, flexibility, therapeutic activities, therapeutic training, therapeutic exercise, gait training, transfer training and home exercise program  Duration in weeks: 12  Plan of Care beginning date: 10/10/2022  Plan of Care expiration date: 2023  Treatment plan discussed with: patient        Subjective Evaluation    History of Present Illness  Mechanism of injury: She usually lives alone, now her son is off from work to help her  She states she needs help to move from the bed  She states that she is walking with a walker at home  She states she sometimes uses the wheelchair in the house, but mainly when leaving the house  She has been staying at her home, but went to her son's house yesterday (bathroom 2nd floor)  She states she has difficulty with going down the stairs  She states she has pain in the knee down and the foot feels heavy and asleep on the L side  She states she wants to walk better with the walker  22 with contracted and weak LUE, LLE weakness, L facial droop, mild dysarthria and left sided sensory deficits  CTA showed near occlusive thormbus at R MCA bifurcation, as well as mild beading of the mid to distal ICAs suspicious for mild fibromuscular dysplasia, and mild atherosclerotic disease at bilateral distal carotid arteries  ARC discharge on 22, discharged to [facility] discharged last week         Pain  Current pain ratin  At best pain ratin  At worst pain ratin  Location: L LE  Quality: cramping  Relieving factors: rest    Social Support  Steps to enter house: no  Stairs in house: no   Lives in: apartment  Lives with: alone    Treatments  Discharged from (in last 30 days): skilled nursing facility  Patient Goals  Patient goal: walk with the walker and get better        Objective       Manual Muscle Testing - Hip Left Right   Flexion 2+ 4+   Extension NT NT   Abduction NT NT     Manual Muscle Testing - Knee Left Right   Flexion 3+ 4+   Extension 3+ 4+     Manual Muscle Testing - Ankle Left Right   Doriflexion 2- 4+   Plantarflexion NT NT         Coordination Left Right   Alt toe tapping unable Not tested       Sensation Left Right   Kinesthesia NT NT   Light Touch intact intact       Muscle Tone (Modified Sada Scale**) Left Right   Hamstring 2 0   Gastroc 3 0   Quad 2 0   **  0 = no increase in muscle tone  1 = slight increase in muscle tone, minimal resistance at the end of ROM when moved  1+ = minimal resistance throughout the remainder (less than half) of ROM when moved  2 =  David increase in muscle tone through most of the ROM, but still moved easily  3 = considerable increase in muscle tone, movement difficult  4 = affects parts rigid       Balance Test Initial Eval      5x Sit to Stand: 27 6 with UE's on FWW, min A      TU 4 seconds with FWW      Gait Speed:  0 30m/s with FWW and CGA      2 Minute Walk Test: 110 feet with FWW and CGA      6 Minute Walk Test: 130 feet in 2min 39 seconds      Shin Balance Scale: NT      FGA:  NT            Transfers    Sit To Stand Min Assist   W/C To Bed Mod Assist   Sit To Supine Mod Assist   Roll Min Assist         Gait Assessment: Decreased gait speed, decreased step length L>R, decreased heel strike bilaterally       Precautions: HTN, a-fib, pacemaker      Manuals 10/10                                                                Neuro Re-Ed             Standing marching             sidesteps             HKM             Sit to stand with LE elevated                                                    Ther Ex             Sit to stand             nustep             treadmill                                                                              Ther Activity             Stand pivot transfer                          Gait Training             With FWW in hallway                          Modalities              POC, neuroplasticity concepts, safety

## 2022-10-10 NOTE — TELEPHONE ENCOUNTER
Patient's son Umu Dobson came into the office requesting refills of these medications  Please refill if appropriate

## 2022-10-10 NOTE — PROGRESS NOTES
OCCUPATIONAL THERAPY INITIAL EVALUATION:    10/10/2022  Abel Mccracken  1940  [id number]  Swetha Hubbard PA-C    Diagnosis ICD-10-CM Associated Orders   1  History of CVA (cerebrovascular accident)  Z86 73 Ambulatory Referral to Occupational Therapy   2  Need for assistance with personal care  Z74 1      Patient was treated by SCOTT Espinoza under the supervision of [address] [place] [address], OTR/L  Assessment/Plan    Skilled Analysis:  Abel Mccracken is a 80 y o  female referred to Occupational Therapy s/p History of CVA (cerebrovascular accident) [Z86 73]  Pt participated in skilled OT evaluation and following formalized testing as well as clinical observation, Pt presents with the following areas of deficit:  LUE strength due to L hand dominance, sensation with LUE, prehension patterns, decreased endurance and speed/rate of manipulation, direction following and proprioception impacting ADL performance (dressing, bathing, and grooming tasks), IADLs (laundry, medication management, and cooking)  Pt's son assisted with subjective information requiring some directions in Taiwanese  OTS unable to complete full [facility] testing today and will be completed next session  Pt will benefit from skilled Occupational Therapy services 2x/week for 12 weeks with focus on HEP for carryover with safety during ADLs/IADLs, UE strength, proprioception, sensation, and FMC/GMC for overall QOL  Abel Mccracken is in agreement with POC  Motor Short Term Goals (4-6 weeks)    Strength/Endurance  ·  Pt will increase LUE proximal shoulder strength to 4+/5  through the use of strengthening exercises and HEP for improving performance during ADL/IADLs for eventual return to life roles  · Pt will increase LUE distal forearm/wrist strength to 4+/5 through the use of strengthening exercises and HEP for eventual return to life roles and independence with ADL/IADLs    · Pt will demo with G tolerance to supine, seated, and in stance exercise x 15-20 minutes with minimal rest breaks required for increased engagement in life roles and increased independence with ADLs/IADLs  · Pt will demo with G carryover of HEP to improve functional progression towards goals in Plan of care and for improved functional use of LUE  FMC/GMC (To be completed by OTR in subsequent sessions)  · Pt will increase LUE rate of manipulation by ___% for all FM tests for improved functional performance with salient tasks   · Pt will increase Reaction time  to < ___ seconds with hand to target timed trials for improved reaction time and automaticity of coordination for improved functional performance with ADLs/IADLs and salient tasks  · Pt will increase LUE prehension patterns for improved utensil and container management  with <___% droppage   · Pt will demo improved motor learning of constructional and new motor actions for improved b/l coordination and motor planning evident by ___% accuracy for fxnl ADL/IADL performance    Functional Performance   · Pt will increase LUE to functional assist (UE has full AROM, uses for all activities and fine motor tasks, but remains assistive with mild awkwardness and weakness) for ADL/IADLs and tabletop tasks for improved functional performance of life roles  · Pt will demo G comprehension of adaptive equipment (long handled reacher/sponge/shoehorn, toileting aid) use in clinic to improve independence in ADL management in home environment    · Pt will increase proprioception of LUE hand to target for improved functional reach vision occluded with ADL tasks  x 75% accuracy   · Pt will require < moderate assist for ADLs (dressing, showering, toileting, grooming) for improved independence with all self-care tasks  · Pt will be able to manage all medications, laundry, and cooking with < Moderate assist from son in order to increase IADL independence         Motor Long Term Goals (12 weeks)     Strength/Endurance  ·  Pt will increase LUE proximal shoulder strength to 5/5  through the use of strengthening exercises and HEP for improving performance during ADLs/IADLs for eventual return to life roles  · Pt will increase LUE distal forearm/wrist strength to 5/5  through the use of strengthening exercises and HEP for eventual return to life roles and independence with ADL/IADLs  · Pt will demo with G tolerance to supine, seated, and in stance exercise x 25-35 minutes with minimal rest breaks required for increased engagement in life roles and increased independence with ADL/IADLs  FMC/GMC (To be completed by OT in subsequent sessions)  · Pt will increase LUE rate of manipulation by ___% for all FM tests for improved functional performance with salient tasks   · Pt will increase Reaction time  to < ___ seconds with hand to target timed trials for improved reaction time and automaticity of coordination for improved functional performance with ADLs/IADLs and salient tasks  · Pt will increase LUE prehension patterns for improved utensil and container management  with <___% droppage   · Pt will demo improved motor learning of constructional and new motor actions for improved b/l coordination and motor planning evident by ___% accuracy for fxnl ADL/IADL performance    Functional Performance   · Pt will increase LUE to fully functional assist (completely functional, used as dominant UE) for ADL/IADL and tabletop tasks for improved functional performance of life roles  · Pt will demo excellent comprehension of adaptive equipment (long handled reacher/sponge/shoehorn, toileting aid) use in clinic to improve independence in ADL management in home environment     · Pt will increase proprioception of LUE hand to target for improved functional reach vision occluded with ADL tasks  x 90% accuracy  · Pt will completed ADLs (dressing, showering, toileting, grooming) with modified independence for improved independence with all self-care tasks  · Pt will be able to manage all medications, laundry, and cooking with Modified [place] in order to increase IADL independence         Subjective    SUBJECTIVE: Per pt's son "I think she needs more strength in her legs and she has difficulties with everything since her stroke"    PATIENT GOAL: "I would like to have 100% again"     Patient-Specific Functional Scale   Task is scored 0 (unable to perform activity) to 10 (ability to perform activity independently)    Activity Date:10/10/2022 Date:   1  Dressing (shirts and pants) 2-3/10 Pt unable to dress self without assistance    2  Bathing 4/10    3  Strength of LUE Arm 5/10  Leg 2/10    4  Grasping/pinching 5/10 Pt has difficulty with picking up pills and small objects        HISTORY OF PRESENT ILLNESS:     Dea Lane is a 80 y o  female who was referred to Occupational Therapy s/p  History of CVA (cerebrovascular accident) [Z86 73]  Pt with recent hospitalization from [place] [address] from 8/12/2022-8/19/2022 presenting with weak LUE, flaccid LLE, left facial droop, mild dysarthria, and mild sensory deficits on the left side  Pt reported she had difficulty moving from her bed on 8/12/2022, son called ambulance  CTA revealed near occlusive thrombus at R MCA bifurcation  Pt was started on stroke protocol heparin gtt (drops)  Pt changed medications from Xarelto to Pradaxa (think Xarelto failure was cause of stroke)  Pt d/c to [facility] rehab where she received OT, PT, ST from 8/13/2022-9/13/2022  Pt transitioned to [facility] where she was d/c 9/24/2022 home to her family  Pt has PMH of a-fib on Xarelto (now changed) with MRI-incompatible pacemaker, osteoarthritis in both wrists, HLD, and HTN  Pt resides in an apartment on the 8th floor with GARRY but uses the elevator  With son close by, pt is using walker to get around her apartment  Pt's son is currently living with her for assistance with ADLs/IADLs  Pt is completing ADLs with assistance from son   Pt is currently using DME (walker, w/c, toilet commode, shower chair)  Looking into getting a medical bed for repositioning and upright posture due to risk for aspiration/coughing  Pt is not currently using any AE  Pt likes to shop, go to J[employer], watching tv  Pt has 3 grandchildren  Pt is currently retired but used to work with [employer]  Pt was independent prior to the stroke  Pt son applied for pt to get into a lower level apartment in case of emergencies  Possibly looking for HHA  Pt presenting with ongoing difficulties with: ADLs/IADLs (bathing, eating, dressing), pinching pills, doing laundry, vision blurriness, reaching with LUE and LUE strength  PMH:   Past Medical History:   Diagnosis Date   • A-fib ([facility] [address] )    • Anemia    • Arthritis    • Breast pain, right    • Chest pain on breathing    • Colon polyp    • Cough    • Diverticulosis    • Encounter for screening colonoscopy    • H/O sick sinus syndrome    • Heart murmur    • High cholesterol    • History of atrial fibrillation     Rate ontrolled  On xarelto 15mg (creatinine 50) Followed by Dr Sumner Morning with Cardiology    • History of weight loss     Report loose fitting clothes  Weight stable (3lbs difference)  Last colo showed small tubular adenoma x2  Breast biopsy last showed fibrocystic changes  TSH wnl  Will check cbc, bmp, spep         • Hx of long term use of blood thinners    • Hypertension    • Irregular heart beat    • Pacemaker    • Preop examination    • Shortness of breath    • Viral bronchitis        Pain Levels   Restin/10 - Pain in her L hip/leg   Pain is mainly when she is in bed trying to get comfortable    With Activity:   Some pain in her leg (numbness) and decreased balance after walking with walker    Objective    Impairment Observations:    SEGUNDO CHANG Comments           UPPER EXTREMITY FUNCTION   Intact Impaired Dominant Hand: Left     /PINCH STRENGTH             Dynamometer    - Gross Grasp 36 lbs 35 lbs low   Pinch Meter     - Pincer 8 lbs 8 lbs low    - Tripod 10 lbs 10 lbs low    - Lateral 9 lbs  8 lbs low     AROM (Seated)         Difficulty following directions with movements    Elevation Full 1/2    Shoulder FF Full  130*     Shoulder Ext Full  65*     Shoulder Abd Full  Full     Shoulder Add Full  Full     Horizontal Abd Full  Full     Horizontal Add Full  WFL     Elbow Flex Full  WFL     Elbow Ext Full  Full     Pronation Full  Full     Supination Full  Full     Wrist Flex Full  40*     Wrist Ext Full  21*     Digit Flex Full  Full     Digit Ex Full  Full     Hook Grasp Full  Full     Subluxation  None  None       MMT             Shoulder FF 4+/5 3+/5    Shoulder Ext 4+/5 4/5    Shoulder Abduction 4/5 4/5    Shoulder Adduction 4/5 3+/5    Elbow Flex 4+/5 4+/5    Elbow Ext 4+/5 4/5    Wrist Flex 4-/5 4-/5    Wrist Ext 4/5 4/5      SENSATION      Monofilament Testing  Normal: 2 83 mm    Diminished light touch: 3 61 mm    Diminished protective sensation: 4 31 mm    Loss of protective sensation: 4 56    Complete loss of sensation / deep pressure: 6 65 3 61 mm 3 61 mm Pt unable to feel 2 83 mm on L/RUE transitioned to 3 61mm and able to feel on entire LUE/RUE   Sharp / Dull  Intact Impaired Pt unable to differentiate between sharp/dull     Proprioception Will further assess Will further assess    Hot/Cold Temp Intact Intact    Stereognosis  Will further assess Will further assess      COORDINATION      Opposition Intact WFL WFL but slow movements with LUE   Finger to Nose Not tested Impaired Slow movements with vision occluded   Rapid Alternating Movement Will further assess Will further assess    9 Hole Peg Test Will further assess Will further assess not examined   Fxnl Dexterity Test Will further assess Will further assess not examined   Moose Hand Function Test         - Handwriting    not examined      - Card Turning   not examined      - Small Item p/u   not examined      - Simulated Feeding   not examined      - Stacking Checkers   not examined - Moving Light Objects   not examined      - Moving Heavy Objects   not examined   Concentric Circles Test (tremors)          TONE: MODIFIED BERTIN SCALE     Will further assess*   No increase in muscle tone (0)      Slight Increase in muscle tone with catch and release or min resist at end range (1)      Slight Increase in muscle tone with catch and release, followed by min resistance through remainder of range (1+)      Increased muscle tone through full range, able to be moved easily (2)      Considerable increase in tone, difficult to move (3)      Rigid in Flexion/Extension (4)              OTHER PLANNED THERAPY INTERVENTIONS:   Supine, seated, and in stance neuro re-ed  Task-Oriented Training  Tricep AG  Hot Packs for pain  FMC/prehension  GMC  Proximal to distal teaming  Timed Trials  Manual tx  Hand to target  Sensory re-ed (Cutaneous/Proprioceptive)  Seated functional reach: crossing midline  Supine place and hold   WBearing strategies   Closed chain activities  Open chain activities  HEP  Splint (PRN*)    INTERVENTION COMMENTS:  Diagnosis: History of CVA (cerebrovascular accident) [Z86 73]  Precautions: Fall risk, pacemaker, No E-STIM   FOTO: Completed  Insurance: Payor: Vevelyn Paci MEDICARE [address] / Plan: Makayla Vallejo / Product Type: Medicare HMO /   1 of ____ visits, PN due 11/10/2022   POC ends 01/02/2023

## 2022-10-10 NOTE — TELEPHONE ENCOUNTER
Patient got to/from her PT/OT appt this date via [facility] [employer] without issue  MSW noted that patient has an upcoming appt on 10/13 for PT/OT  MSW phoned patient's son, Jennifer Mckeon, at [phone] to see if patient needs transportation to the appt on 10/13  [id number] [facility] said yes  MSW facilitated a conference call with the [employer] at [phone] and spoke with Jeovanny Prince  He tried to book trip, but stated that since there was less than 72 hours notice, that this writer would need to contact patient's insurance at [phone] to have them send an "authorization email" in order for the [employer] for them to arrange the trip  MSW phoned patient's insurance at [phone] and spoke with Srini to get the special exception  He was unable to reach the department that he needed to speak to to confirm next steps  Srini provided this writer with a reference # as [id number] and stated that he will follow-up with this writer around 930AM on 10/11/22 after confirming the next steps

## 2022-10-11 ENCOUNTER — PATIENT OUTREACH (OUTPATIENT)
Dept: INTERNAL MEDICINE CLINIC | Facility: CLINIC | Age: 82
End: 2022-10-11

## 2022-10-11 ENCOUNTER — TELEPHONE (OUTPATIENT)
Dept: NEUROLOGY | Facility: CLINIC | Age: 82
End: 2022-10-11

## 2022-10-11 DIAGNOSIS — Z78.9 NEEDS ASSISTANCE WITH COMMUNITY RESOURCES: Primary | ICD-10-CM

## 2022-10-11 NOTE — TELEPHONE ENCOUNTER
Post CVA Discharge Follow Up  Hospitalization: 8/19/22-9/13/22    Called patient's son, Mitzi Humphreys  He is on the communication consent form  Patient had an appointment on 10/3/22 with the neurology office  Since the office visit on 10/3/22, he denies the patient experiencing any new or worsening stroke-like symptoms  He reports how the patient continues to have left hip/left leg pain with numbness and swelling  he states how the PCP ordered a doppler to assess for a blood clot  He has not scheduled this test yet  Advised Donal to schedule the test immediately due to the urgent manner  He will do so  He reports how the PCP office is currently helping him fill out his FMLA forms  He states how a nursing agency comes to their house to help provide assistance to the patient from 9 am to 4 pm  He is currently in the process to obtain more hours since he needs more help  Donal is concerned regarding losing his job and he has a wife and child that needs help from him to  He is requesting to speak with one of the neurology social workers to discuss options  He reports how he has spoken to Ayaan Machuca in the past for transportation  Advised Donal how I will send a message to the social workers  He does not want to place the patient in a nursing home due to bad experiences in the past with nursing facilities  Patient requires assistance with ADLs and uses a wheelchair for mobilization  Patient recently followed up with the PCP on 10/5/22  She is going to see gastro on 10/15/22 due to GI upset  The patient participates with PT/OT in the outpatient setting  During this call, we reviewed stroke symptoms and risk factors  He is aware when to present to the ER  Patient is a nonsmoker  Encouraged for patient to follow a low salt, low cholesterol diet and diabetic friendly diet  I addressed all her questions  At the conclusion of the conversation, he denies having any further questions or concerns

## 2022-10-11 NOTE — TELEPHONE ENCOUNTER
Srini from Provider Services at [facility] did not call back this morning at 930AM as he said he would, so MSW phoned patient's insurance again at [phone]  MSW spoke with Renae Thumran and used the reference # [id number]  Renae Thurman was able to get Srini on the line  Srini sent an email to his supervisor asking for an exception for wheelchair [place] transportation request for less than 72 hours  Srini indicated that only his supervisor can submit the special exception to the care management team  Lexy Goddard stated that this writer should receive a callback today or tomorrow to let this writer know if the request was approved  MSW will await call from [facility]  MSW did phone Shahana Carver to make him aware of above at [phone]  MSW will update patient's son as able

## 2022-10-11 NOTE — TELEPHONE ENCOUNTER
Incontinent supplies and low air los alternating pressure pads faxed over to [employer]   Confirmation received 10/11/22

## 2022-10-11 NOTE — TELEPHONE ENCOUNTER
MSW spoke with Leigha Galvin,  at [employer]  She reported that she spoke with patient's son this date and he requested 24 hour care for patient  Leigha Galvin agrees that requests for more care, follow-up regarding appeal, and change/addition of aide agencies should be handled by patient's   MSW will try to reach  again on 10/12

## 2022-10-11 NOTE — TELEPHONE ENCOUNTER
MSW phoned patient's son, Malini Schwartz, this date at [phone]  Patient's son stated that he needs more in home assistance for patient as her condition is worse since having had the stroke (less mobility)  Malini Schwartz stated that he just spoke with patient's  at [facility] earlier this date as well  Donal stated that he is completing the paperwork to take a Leave of Absence from his work, but that he will need more ongoing assistance with patient  Donal Whitt alluded that patient had 30 hours of in home assistance through [employer] (presumably through a Waiver)  Malini Schwartz stated that patient recently had gotten approved for 15 more hours, for a total of 45 hours of care per week  Malini Schwartz stated that that is still not enough, and that patient needs more as he will not always be able to be with patient  Enriqueta Melendez stated that he appealed the decision, but it will be 7-10 days before he will get an answer about more services  Furthermore, Malini Schwartz stated that patient receives services from an aide agency called [address] [place] [address], and today the aide did not show up  MSW advised that it sounds as if patient has a Waiver, if so, patient should be assigned to a  whose responsibility it is to coordinate Waiver services (I e  submit for more hours, change aide agencies, etc)  MSW offered to call same  Donal Whitt provided the "coordinator" name and number as Snehal Pacheco at [phone] or [phone]  MSW also obtained verbal consent from Malini Schwartz for this writer to reach out to [place] , Sarah Estes, who is also likely working to get increased hours in place for patient  MSW reached out to [employer], , via Teams  Awaiting response      MSW attempted to reach patient's , Snehal Pacheco, at [phone] but got fast busy signal  MSW called to [phone] which is the [employer] line  MSW was given the correct # for patient's , Almeda Claude, as [phone]  MSW attempted to reach Almeda Claude, patient's , at [phone]  No answer and no way to leave a message as the voicemail box was full  MSW will try to reach her again at a later time

## 2022-10-11 NOTE — PROGRESS NOTES
SW returned call to pt's son El Lazcano who is requesting for additional help for Pt at home since she recently had a stroke and was in short term rehab  Pt is on the [place] and has had 30 hours approved and then had an additional 15 hours approved  As per son pt needs 24 / hours per day or at least 2 shifts ( 16 hours per day)  He has been talking off of work and is trying  to get a FMLA  approved so he does not loose his job  He relates pt is weak and needs help doing everything   She has had a 3 hour assessment by her  Bertha Garcia [phone] and pt/son have submitted an APPEAL to get 24 Hour Care  Son was told it can take up to 10 days to be reviewed  Son had to urgently take off because her aide had quit and he had to cover the shift  They did finally get someone to help  He is thinking of seeing if they can have 2 agencies involeved IF this would help with coverage of shifts  JULIOCESAR has explained those details will need to be worked out with their   JULIOCESAR has offered to try to reach out to the  for an update  JULIOCESAR also later received a message from Yoana Lewis pt's neurology SWer who has been asked to assist with this matter  SW returned call and Ms Andreina Landrum shares she has attempted to reach [facility] and was not able to reach her or leave a message  JULIOCESAR will f/u and also attempt to reach pt's  as well

## 2022-10-12 ENCOUNTER — TELEPHONE (OUTPATIENT)
Dept: INTERNAL MEDICINE CLINIC | Facility: CLINIC | Age: 82
End: 2022-10-12

## 2022-10-12 ENCOUNTER — PATIENT OUTREACH (OUTPATIENT)
Dept: INTERNAL MEDICINE CLINIC | Facility: CLINIC | Age: 82
End: 2022-10-12

## 2022-10-12 NOTE — TELEPHONE ENCOUNTER
You seen patient on 10/5/22  Do you recall if patient had any wounds? Asking because insurance is requiring documentation to justify the needs for low air pads that patient requested

## 2022-10-12 NOTE — TELEPHONE ENCOUNTER
MSW had yet to hear back from patient's insurance company about request for transportation exception in less than 72 hours  MSW phoned [phone] and spoke with Naty Vaca in provider services  Naty Vaca researched outcome of this writer's request (original request for transportation with less than 72 hours notice was # [id number]) and spoke with the [facility] department  Naty Vaca was informed by the [facility] department that they can only approve an request for transportation with less than 72 hours notice if the purpose of the trip is for chemo, dialysis, or surgery  MSW advised that purpose of trip is for PT/OT but the appt was not scheduled with 72 hours notice  Naty Vaca stated that the request cannot be approved as she does not meet the  guidelines  Reference # for call is [id number]

## 2022-10-12 NOTE — TELEPHONE ENCOUNTER
MSW attempted to reach Richard Roman, patient's , at [phone]  No answer and no way to leave a message as the voicemail box was full  MSW will continue to try to reach patient's   MSW also located [facility] name and number from chart as Flori Gregory at [phone]  MSW attempted to reach Sonido Gruber at [phone], but there was no answer  MSW stated that this writer was calling to check the status of appeal and see if they required a letter of medical necessity to substantiate the request for increased hours  MSW left a message requesting callback  Awaiting same

## 2022-10-12 NOTE — TELEPHONE ENCOUNTER
Received a fax from [address] [place] [address] requesting the following information:  - Demographic Sheet  -  Chart Notes  - Wound documentation for the low air loss being order      Once documents are ready fax to [phone]

## 2022-10-12 NOTE — PROGRESS NOTES
JULIOCESAR has attempted to reach pt;s  several times but the # SW has been given both [phone] and [phone] are both are  not in service  JULIOCESAR has also reached out to [address] [place] [address] [phone] and has left a message requesting an update on pt's APPEAL for additional hours  SW to f/u with Neurology SWer / pt/ family as indicted

## 2022-10-12 NOTE — TELEPHONE ENCOUNTER
MSW phoned [employer] at [phone] and spoke with Trena  She spoke with her supervisor - they did not receive an override to approve the transportation for 10/12  MSW did request round trip wheelchair [employer] transportation for patient's 10/19 PT/OT appts and requested that patient's son be able to accompany her  Trip details are as follows:    Pick-up time: 8AM at [place] [address] [address], [address]  Destination address: [address]  Reference # for trip is [id number]    Return trip  time: 1030AM at [address]  Destination address: [place] [address] [address], [address]  Reference # for trip is [id number]    MSW phoned patient's son at [phone] to make him aware that the insurance was unable to approve the transport request for less than 72 hours since patient did not meet the criteria (only can approved for chemo, dialysis, or surgery)  Donal stated that he would get patient to/from the appt  MSW did advise that this writer did request round trip wheelchair Jerry Navas transportation to patient's 10/19 PT/OT appt and requested that son be able to accompany her  MSW inquired if patient's son had received the [place] application by mail yet  Patient's son stated that he had not  MSW left an application and proof of age document in an envelope at the [address] Neurology office's   Donal will  and complete same tomorrow when he brings patient in for her PT/OT appt  MSW will obtain the completed application from [address]  and will submit to [employer] Malcolm along with [place] 2 since patient has [place] coverage

## 2022-10-13 ENCOUNTER — OFFICE VISIT (OUTPATIENT)
Dept: PHYSICAL THERAPY | Facility: CLINIC | Age: 82
End: 2022-10-13
Payer: MEDICARE

## 2022-10-13 ENCOUNTER — TELEPHONE (OUTPATIENT)
Dept: INTERNAL MEDICINE CLINIC | Facility: CLINIC | Age: 82
End: 2022-10-13

## 2022-10-13 ENCOUNTER — PATIENT OUTREACH (OUTPATIENT)
Dept: INTERNAL MEDICINE CLINIC | Facility: CLINIC | Age: 82
End: 2022-10-13

## 2022-10-13 ENCOUNTER — OFFICE VISIT (OUTPATIENT)
Dept: OCCUPATIONAL THERAPY | Facility: CLINIC | Age: 82
End: 2022-10-13
Payer: MEDICARE

## 2022-10-13 DIAGNOSIS — Z86.73 HISTORY OF CVA (CEREBROVASCULAR ACCIDENT): ICD-10-CM

## 2022-10-13 DIAGNOSIS — Z86.73 HISTORY OF CVA (CEREBROVASCULAR ACCIDENT): Primary | ICD-10-CM

## 2022-10-13 DIAGNOSIS — Z74.1 NEED FOR ASSISTANCE WITH PERSONAL CARE: ICD-10-CM

## 2022-10-13 DIAGNOSIS — Z74.09 IMPAIRED FUNCTIONAL MOBILITY, BALANCE, GAIT, AND ENDURANCE: Primary | ICD-10-CM

## 2022-10-13 PROCEDURE — 97530 THERAPEUTIC ACTIVITIES: CPT

## 2022-10-13 PROCEDURE — 97110 THERAPEUTIC EXERCISES: CPT

## 2022-10-13 PROCEDURE — 97112 NEUROMUSCULAR REEDUCATION: CPT

## 2022-10-13 PROCEDURE — 97116 GAIT TRAINING THERAPY: CPT

## 2022-10-13 NOTE — TELEPHONE ENCOUNTER
When MSW spoke with patient's , Kim Benson, this date she indicated that patient may already have [employer] services therefore, MSW phoned [employer] [facility] at [phone] and spoke with Susan Killian at [place] [address]  She confirmed that patient is registered with Jarrod Gruber and has been since 4/7/21 under Senior Shared Ride and MTP so patient has free transportation for medical trips  MSW did update the address and phone numbers on file, as patient's address on file with Michael Hdz showed that she was living on [place] [address] in [place]  MSW provided the correct address as: [facility] [address], [address] [facility], [address]  MSW also provided patient's phone number as [phone] and patient's son Anisa Lima number as [phone]  MSW met with patient's son, Zuleima Hsu, when he came to [facility] PT to  patient  MSW advised that patient is already registered to use [employer]  MSW reviewed that [employer] is a shared ride, door to door service in [employer]  MSW advised that patient has free transportation for medical appointments, and rides can be called in up to 14 days in advance  MSW also did advise that patient still does have some round trip rides that can be used through her [id number] [address] through [employer] (still should have about 47 round trips remaining)  MSW did advise that [employer] does require 72 hours notice for rides and is more of an individual service (only rider in the car) whereas [employer] is a shared ride service  MSW did advise that this writer did arrange transportation to patient's 10/19 PT/OT appt, but asked that Donal make transportation arrangements beyond that  MSW provided the contact number for both [employer] and [employer]  MSW will follow-up with [employer] on 10/18 to confirm transportation arrangements for 10/19  MSW will update Donal as able      MSW will be available to assist as needed until Donal becomes familiar with the process or requesting rides

## 2022-10-13 NOTE — PROGRESS NOTES
SW spoke with pt's son this date as he came to the Office for FMLA paperwork to be completed  He is having to take time off of work to care for hsi Mother but he is in fear of losing his job  He also shared Kalina Haynes did call him on Tuesday when he was not with his Mother but would call back yesterday but she did not  He is still waiting for an update from her re the Waiver   SW has received an up date from Rosenda Hayes with Neurology who has been able to speak to pt's service   She shares that Kalina Haynes  stated "that patient was approved for 45 hours a week, but the son started an appeal to request 24 hour care  Nette Moise stated that the appeal process can take about 1-2 weeks and that the appeal was only filed 1-2 days ago so patient/family must be patient to allow for the appeal process to be concluded  JULIOCESAR will ask if PCP if he can write a  Letter of Medical Necessity that can be faxed to   ATTENTION:  Kalina Haynes   at  [facility] # [phone]  JULIOCESAR to remain avavialbe to assist as indicted

## 2022-10-13 NOTE — TELEPHONE ENCOUNTER
MSW drafted letter of medical necessity and sent to SHERRI Willard, and Xiomara Viramontes PA-C for review/editing  MSW will fax letter of medical necessity to patient's  once reviewed/edited/signed       Patient's son, El Lazcano, aware that the neurology office is generating letter to submit to patient's  for the appeal

## 2022-10-13 NOTE — TELEPHONE ENCOUNTER
Patient's son would need to call insurance to see if they can approve the refill sooner, there's nothing on our end that could be done

## 2022-10-13 NOTE — TELEPHONE ENCOUNTER
Spoke with pharmacist at [facility] whom stated they delivered all the other medications except the Pradaxa  There is a 60 day supply at the pharmacy and Donal will pick it up

## 2022-10-13 NOTE — TELEPHONE ENCOUNTER
Folder Color- Blue     Name of Form- FMLA forms     Form to be filled out by- Dr Mcdermott     Form to be picked up by son  Patient made aware of 10 business day policy

## 2022-10-13 NOTE — TELEPHONE ENCOUNTER
Patient's son Garrett Dukes on medical communication consent is whom provides care for patient since she had her stroke on 8/19/22  Debo Pedersen filled out the first set of forms, but Donal whom works for [employer] has been asked to have PCP fill out another set of forms because the first set indicated that he would need to be out 8 hours a day 7 days a week  At the time it was ideal because patient was not in good conditions and needed her son at all times  Since then patient has been approved for home care  Michaelyn Sandifer asks that he be out as needed, he just wants to be protected from loosing his job in the case that the home care workers call off  He needs to be available to care for mom, as he is the only one in the family that is available  Donal would like the doctor to indicate on the form that he would need to be out 4 times a week 8 hours per day  Donal will be bringing in blank FMLA forms for PCP to fill out  Donal was made aware that we have a 10 day business policy but would like the forms to be filled out as soon as possible  He plans to be back in to work on 10/17/22  First set of FMLA forms filled out by Debo Pedersen will be scanned into media, in the case that PCP would need to reference it  Thank you

## 2022-10-13 NOTE — PROGRESS NOTES
Daily Note     Today's date: 10/13/2022  Patient name: Lawyer Dangelo  : 1940  MRN: [id number]  Referring provider: Kathryn Schultz PA-C  Dx:   Encounter Diagnosis     ICD-10-CM    1  Impaired functional mobility, balance, gait, and endurance  Z74 09    2  History of CVA (cerebrovascular accident)  Z86 73                   Subjective: Patient reports that her leg is feeling better today than last session, still having swelling in the L leg and ankle  She states that she is going for a test for it  Objective: See treatment diary below  /62 at start of session    Assessment: Tolerated treatment well  Patient demonstrated fatigue post treatment, exhibited good technique with therapeutic exercises and would benefit from continued PT  Requires cues for increased WB on L LE during sit to stand transfers, improved with repetition  Cues for increased step height on L LE during ambulation and // bars activities  Continue to progress as tolerated  Plan: Continue per plan of care  Precautions: HTN, a-fib, pacemaker      Manuals 10/10 10/13                                                               Neuro Re-Ed             Standing marching             sidesteps  // bars (short) x 4 laps           HKM  // bars (short) x 3 laps           Sit to stand with LE elevated  R LE on 4" step, x 10 reps, therapist assist in front            Step taps  BUE support on 6-inch step x 20 reps each                                     Ther Ex             Sit to stand  With FWW in front x 10 reps, tactile cues at L LE for quad activation           nustep             treadmill                                                                              Ther Activity             Stand pivot transfer                          Gait Training             With FWW in hallway  1 trial of 300 feet with CGA    1 trial of 200 feet with HHA CGA-min A           stairs  Up and over staircase with bilat handrails, x 3 laps   Step over step pattern on 4-inch, step to pattern on 6-inch           Modalities              POC, neuroplasticity concepts, safety                           Access Code: [email or web address]  URL: [email or web address]  Date: 10/13/2022  Prepared by: Marnie Davila    Exercises  · Seated Hamstring Stretch with Strap - 1 x daily - 7 x weekly - 3 sets - 30 hold  · Seated Gastroc Stretch with Strap - 1 x daily - 7 x weekly - 3 sets - 30 hold  · Seated Long Arc Quad - 1 x daily - 5 x weekly - 3 sets - 10 reps  · Seated March - 1 x daily - 5 x weekly - 3 sets - 10 reps  · Seated Heel Raise - 1 x daily - 5 x weekly - 3 sets - 10 reps  · Seated Hip Adduction Isometrics with Ball - 1 x daily - 5 x weekly - 3 sets - 10 reps - 5 hold  · Seated Hip Abduction - 1 x daily - 5 x weekly - 3 sets - 10 reps

## 2022-10-13 NOTE — TELEPHONE ENCOUNTER
MSW phoned patient's , Marva Durham, again this date at was able to connect with her at [phone]  Ravi Wong stated that patient was approved for 45 hours a week, but the son started an appeal to request 24 hour care  Ravi Fullero stated that the appeal process can take about 1-2 weeks and that the appeal was only filed 1-2 days ago so patient/family must be patient to allow for the appeal process to be concluded  Ravi Fullero stated that it is very hard to get 24 hours of care since many patients do not require assistance while sleeping over the nighttime hours  Ravi Fullero stated that, based on her last assessment, patient only gets up 1-2 times a night  MSW did inquire if a letter of medical necessity from this office would be beneficial  Alaynajose enrique Fullero stated that our office can certainly provide same and that the PCP office can as well  Letters of medical necessity can be faxed to her at [phone] (must be sent to attention Marva Durham)  MSW will draft letter of medical necessity with RN and GATITO to submit to Ravi Wong for the appeal      MSW advised that this writer has been involved regarding transportation and has been getting patient to/from her neurology and PT/OT appts through her [facility] coverage with [employer] and has been encouraging patient/family to apply for [employer] services  Ravi Fullero stated that she is fairly certain that patient is already registered with [employer], but was not able to verify this as she was on the road to do assessments with other patients  Ravi Wong stated that patient was even approved for non-medical transportation under [employer]  MSW will call [employer] to inquire if patient is already registered with them  MSW asked Ravi Wong to keep this writer posted about the outcome of the appeal  Alaynajose enrique Wong stated that she would do so   Ravi Wong stated that she was also getting calls from patient's PCP office with the same inquires about the appeal but that she is "in the field 6 hours a day"  MSW offered to relay above information to Tammy Jarrett,  at [facility] PCP office  Nae Alfonso was appreciative of same  MSW did phone Jose Floor at [phone] and spoke with Steve Skill at [place] [address]  She confirmed that patient is registered with [facility] and has been since 4/7/21 under Senior Shared Ride and MTP so patient has free transportation for medical trips  MSW did update the address and phone numbers on file, as patient's address on file with Rene Davis showed that she was living on [place] [address] in [facility]  MSW provided the correct address as: [place] [address], [address] [place] [address], [address]  MSW also provided patient's phone number as [phone] and patient's son Paz Zambrano number as [phone]

## 2022-10-13 NOTE — TELEPHONE ENCOUNTER
Patients son Donal on medical communication consent is present in office with concerns  Medication Pradaxa sent by our office on 10/10/22 is not due for refill, pharmacist informed him  He states patient only has 2 pills left which will only be sufficient for one more day  He is concerned because he does not want his mom to have another stroke in consequence of not taking the medication  Please advise as to what can be done as family is very concerned  Thank you

## 2022-10-13 NOTE — TELEPHONE ENCOUNTER
I spoke with the pharmacist at [facility], he stated they delivered 60 pills of the Pradaxa yesterday on 10/12/22  I called and spoke with son Donal to make him aware and he is insisting they did not deliver it  I asked him if he had the bottles in front of him for me to go over them with him and he stated he isnt at home right now but will call me back later today

## 2022-10-13 NOTE — TELEPHONE ENCOUNTER
A letter requesting an increase in care for 16-24 hours was added to the patient's chart  Please fax to [facility] at [phone]  Thank you

## 2022-10-13 NOTE — PROGRESS NOTES
Occupational Therapy Daily Note:    Today's date: 10/13/2022  Patient name: Deepa Garrett  : 1940  MRN: [id number]  Referring provider: Jolie Willett PA-C  Dx:   Encounter Diagnoses   Name Primary? • History of CVA (cerebrovascular accident) Yes   • Need for assistance with personal care        Subjective: No, no pain  Objective: Pt engaged in skilled OT treatment session with focus on UE strengthening, UE endurance, FMC/GMC, FMS/GMS and body awareness to increase engagement, endurance, tolerance, and independence with daily ADL and IADL tasks  CPT Code Minutes                                           Task Details        Therapeutic Activity 50 Pt engaged in large peg placement this date using colored arrow sheet as guide for appropriate color peg placement to target board on slantboard,placing 49 pegs  Pt then using yellow tension pin to place marbles with mod difficulty with accuracy to targets dropping noted 50% of the time  Minimal accuracy increase noted with mass practice, with therapist decreasing difficulty to using hand to manipulate marbles x 2 from palm to digits to place on second half of targets, with no drops noted  Increased accuracy noted with hand use of manipulation  Upon removing marbles, pt manipulated x 3 marbles from palm to digits with increased accuracy and speed, pt able to manipulate well with x 1 cue for complete translation  Pt then removing and manipulating pegs to to flip them for object manipulation work with no drops noted  Pt then removing pegs with yellow tension pins  Activity with focus on UE strength/endurance, functional reach, proximal stability, hand to target accuracy, activity tolerance/engagement, and GMC/GMS, and FMC/FMS  Difficulty noted throughout with perceptions of tension on pins and targets                Neuro Re-Ed               Therapeutic Exercise 10 Pt then completed pool noodle stacking to vertical target place on tabletop in front of pt for UE strength/endurance, hand to target accuracy, proximal stability, grasp/release, GMC/GMS, and activity tolerance  Increased target accuracy noted with large objects compared to smaller during previous activity  Pt states no pain or fatigue noted  Manual          Self Care           Assessment: Tolerated treatment well  Pt demo difficulty with coordination and proprioception to target accuracy throughout TA activity  Therapist noting fatigue throughout with with arm not reaching as far or as high as session progressed  Patient would benefit from continued skilled OT with focus on proprioception and coordination tasks      Plan: Continued skilled OT per POC    INTERVENTION COMMENTS:  Diagnosis: History of CVA (cerebrovascular accident) [Z86 73]  Precautions: Fall risk, pacemaker, No E-STIM   FOTO: Completed  Insurance: Payor: [facility] Vidhya Zuñiga REP / Plan: Nithin Casas / Product Type: Medicare HMO /   2 of 16 visits, PN due 11/10/2022   POC ends 01/02/2023

## 2022-10-14 ENCOUNTER — TELEPHONE (OUTPATIENT)
Dept: INTERNAL MEDICINE CLINIC | Facility: CLINIC | Age: 82
End: 2022-10-14

## 2022-10-14 NOTE — TELEPHONE ENCOUNTER
Folder Color- BLUE    Name of Form- [employer] Statement of Medical Necessity    Form to be filled out by- Dr Geovani Aguilar    Form to be Faxed to [phone]    Patient made aware of 10 business day policy

## 2022-10-14 NOTE — TELEPHONE ENCOUNTER
Contacted the patients son Donal and informed him that his [place] paperwork has been filled out  He stated that he will be in to pick them up as soon as possible

## 2022-10-14 NOTE — TELEPHONE ENCOUNTER
Letter of medical necessity reviewed/edited/signed by Sherri Tolbert PA-C  Letter faxed to patient's , Isa Cockayne, at [phone]  Successful fax notice received  MSW phoned patient's , Isa Cockayne, at [phone]  No answer  MSW left a detailed message making her aware that 2 letters of medical necessity were faxed - one from PCP office and one from neurology  MSW also requested update regarding outcome of appeal      MSW also phoned patient's son, Dave Mireles, to make him aware that letters of medical necessity were faxed to support the need for additional hours of care  MSW asked Donal to update this writer regarding outcome of appeal      MSW will follow

## 2022-10-14 NOTE — TELEPHONE ENCOUNTER
Parish Espinosa from [address] [place] [address] called and stated that the patient does not qualify for the Low Air Loss Mattress  She asked if someone can call her to discuss what the patient does qualify for       Please call [phone] ex [phone]

## 2022-10-17 NOTE — TELEPHONE ENCOUNTER
Spoke to [place] from [employer]  She stated patient qualifies for a alternating pressure pad (NISHANT)  Please review and place new order in chart  We will fax to [facility]

## 2022-10-18 NOTE — TELEPHONE ENCOUNTER
LATE NOTE FROM 10/17/22:    AYAKA retrieved a message left by patient's son, Teddy Barroso  He stated that he tried to call [employer]w[employer] to scheduled future rides, but was told that patient's application does not reflect that she travels by wheel chair and that she needs an attendant  Donal asked for this writer's help  Donal stated that Camron Bhardwaj will allow for someone to escort patient this week, but that they will need to receive the letter by next week in order for patient to continue to travel with an escort  MSW phoned [employer]ron Bhardwaj at [phone] and spoke with Ashok Hurley at [place] [address]  Ashok Hurley stated that they can accept a letter signed by a  to update patient's requirements for travel  Ashok Hurley stated that letter can be faxed to Camron Bhardwaj at [phone]  MSW phoned Donal back at [phone] to make him aware that this writer will generate letter and will sent to Camron [place] stated that he went back to work this week and is working 9AM-5PM, but that patient's aides from [address] [place] [address] will [facility]

## 2022-10-19 ENCOUNTER — OFFICE VISIT (OUTPATIENT)
Dept: OCCUPATIONAL THERAPY | Facility: CLINIC | Age: 82
End: 2022-10-19
Payer: MEDICARE

## 2022-10-19 ENCOUNTER — OFFICE VISIT (OUTPATIENT)
Dept: PHYSICAL THERAPY | Facility: CLINIC | Age: 82
End: 2022-10-19
Payer: MEDICARE

## 2022-10-19 ENCOUNTER — TELEPHONE (OUTPATIENT)
Dept: INTERNAL MEDICINE CLINIC | Facility: CLINIC | Age: 82
End: 2022-10-19

## 2022-10-19 DIAGNOSIS — Z86.73 HISTORY OF CVA (CEREBROVASCULAR ACCIDENT): ICD-10-CM

## 2022-10-19 DIAGNOSIS — Z74.1 NEED FOR ASSISTANCE WITH PERSONAL CARE: ICD-10-CM

## 2022-10-19 DIAGNOSIS — Z86.73 HISTORY OF CVA (CEREBROVASCULAR ACCIDENT): Primary | ICD-10-CM

## 2022-10-19 DIAGNOSIS — Z74.09 IMPAIRED FUNCTIONAL MOBILITY, BALANCE, GAIT, AND ENDURANCE: Primary | ICD-10-CM

## 2022-10-19 LAB
DME PARACHUTE DELIVERY DATE ACTUAL: NORMAL
DME PARACHUTE DELIVERY DATE EXPECTED: NORMAL
DME PARACHUTE DELIVERY DATE REQUESTED: NORMAL
DME PARACHUTE ITEM DESCRIPTION: NORMAL
DME PARACHUTE ORDER STATUS: NORMAL
DME PARACHUTE SUPPLIER NAME: NORMAL
DME PARACHUTE SUPPLIER PHONE: NORMAL

## 2022-10-19 PROCEDURE — 97116 GAIT TRAINING THERAPY: CPT

## 2022-10-19 PROCEDURE — 97112 NEUROMUSCULAR REEDUCATION: CPT

## 2022-10-19 PROCEDURE — 97150 GROUP THERAPEUTIC PROCEDURES: CPT

## 2022-10-19 NOTE — TELEPHONE ENCOUNTER
Patient states that she received 10/19/22 a mattress ordered by Dr Kianna Ngo that fits a hospital bed  Patient does not have a hospital bed and is requesting a hospital bed so that she can fit the mattress      Please check into this and have PCP order the positional hospital bed ASAP    thanks

## 2022-10-19 NOTE — PROGRESS NOTES
Daily Note     Today's date: 10/19/2022  Patient name: Satish Lima  : 1940  MRN: [id number]  Referring provider: Corina Cunningham PA-C  Dx:   Encounter Diagnosis     ICD-10-CM    1  Impaired functional mobility, balance, gait, and endurance  Z74 09    2  History of CVA (cerebrovascular accident)  Z86 73                   Subjective: Patient reports her leg continues to feel heavy when in bed  She states she felt tired after last session  She states she was at her son's house over the weekend and did better on the steps  Objective: See treatment diary below      Assessment: Tolerated treatment well  Patient demonstrated fatigue post treatment, exhibited good technique with therapeutic exercises and would benefit from continued PT  Was able to tolerate session with 2# AW on L LE  Increased ambulation of 2 trials of 400 feet, trialing both FWW and rollator  Cues for increased foot clearance on L LE with fatigue  Cues for foot positioning for safety with stepping down with L LE to clear entire step  Continue to progress as tolerated  Plan: Continue per plan of care        Precautions: HTN, a-fib, pacemaker      Manuals 10/10 10/13 10/19                                                    Neuro Re-Ed           Standing marching           sidesteps  // bars (short) x 4 laps // bars (short) x 4 laps 2# AW on L        HKM  // bars (short) x 3 laps // bars (sbort) x 4 laps with 2# AW on L        Sit to stand with LE elevated  R LE on 4" step, x 10 reps, therapist assist in front  R LE on 4" step, x 15 reps, therapist in front         Step taps  BUE support on 6-inch step x 20 reps each L UE support on 6-inch step x 10 reps each with 2# AW on L LE        backwards   // bars (short) x 3 laps with 2# AW on L LE                   Ther Ex           Sit to stand  With FWW in front x 10 reps, tactile cues at L LE for quad activation         nustep           treadmill Ther Activity           Stand pivot transfer                      Gait Training           With FWW in hallway  1 trial of 300 feet with CGA    1 trial of 200 feet with HHA CGA-min A 1 trial of 400 feet with FWW and CGA    1 trial of 400 feet with rollator and CGA        stairs  Up and over staircase with bilat handrails, x 3 laps  Step over step pattern on 4-inch, step to pattern on 6-inch Up and over staircase with bilat handrails x 5 laps  Step over step pattern on each direction, cues for foot positioning  2# AW on L LE        Modalities            POC, neuroplasticity concepts, safety                       Access Code: [email or web address]  URL: [email or web address]  Date: 10/13/2022  Prepared by: Corinna Acosta    Exercises  · Seated Hamstring Stretch with Strap - 1 x daily - 7 x weekly - 3 sets - 30 hold  · Seated Gastroc Stretch with Strap - 1 x daily - 7 x weekly - 3 sets - 30 hold  · Seated Long Arc Quad - 1 x daily - 5 x weekly - 3 sets - 10 reps  · Seated March - 1 x daily - 5 x weekly - 3 sets - 10 reps  · Seated Heel Raise - 1 x daily - 5 x weekly - 3 sets - 10 reps  · Seated Hip Adduction Isometrics with Ball - 1 x daily - 5 x weekly - 3 sets - 10 reps - 5 hold  · Seated Hip Abduction - 1 x daily - 5 x weekly - 3 sets - 10 reps

## 2022-10-19 NOTE — PROGRESS NOTES
OCCUPATIONAL THERAPY INITIAL EVALUATION (Addendum):     10/19/2022  Satish Lima  1940  [id number]  Corina Cunningham PA-C     Diagnosis ICD-10-CM Associated Orders   1  History of CVA (cerebrovascular accident)  Z86 73    2  Need for assistance with personal care  Z74 1      Patient was treated by Tennis Richard, OTS under the supervision of [address] [place] [address], OTR/L  Time:   1/1  [id number] Group  1035-11:00 Unsup    Assessment/Plan    Skilled Analysis:  Satish Lima is a 80 y o  female referred to Occupational Therapy s/p History of CVA (cerebrovascular accident) [Z86 73]  Pt participated in skilled OT evaluation and following formalized testing as well as clinical observation, Pt presents with the following areas of deficit:  LUE strength due to L hand dominance, sensation with LUE, prehension patterns, decreased endurance and speed/rate of manipulation, direction following and proprioception impacting ADL performance (dressing, bathing, and grooming tasks), IADLs (laundry, medication management, and cooking)  Pt's son assisted with subjective information requiring some directions in Tongan  OTS created an addendum to address FM, stereognosis, proprioception, and rapid alternating motions  OTS addressed FM goals focusing on LUE rate of manipulation and b/l coordination for functional ADL/IADL performance  Pt will benefit from skilled Occupational Therapy services 2x/week for 12 weeks with focus on HEP for carryover with safety during ADLs/IADLs, UE strength, proprioception, sensation, and FMC/GMC for overall QOL  Satish Lima is in agreement with POC  Motor Short Term Goals (4-6 weeks)    Strength/Endurance  ·  Pt will increase LUE proximal shoulder strength to 4+/5  through the use of strengthening exercises and HEP for improving performance during ADL/IADLs for eventual return to life roles    · Pt will increase LUE distal forearm/wrist strength to 4+/5 through the use of strengthening exercises and HEP for eventual return to life roles and independence with ADL/IADLs  · Pt will demo with G tolerance to supine, seated, and in stance exercise x 15-20 minutes with minimal rest breaks required for increased engagement in life roles and increased independence with ADLs/IADLs  · Pt will demo with G carryover of HEP to improve functional progression towards goals in Plan of care and for improved functional use of LUE  FMC/GMC   · Pt will increase LUE rate of manipulation by 10% for all FM tests for improved functional performance with salient tasks   · Pt will demo improved motor learning of constructional and new motor actions for improved b/l coordination and motor planning evident by 75% accuracy for fxnl ADL/IADL performance    Functional Performance   · Pt will increase LUE to functional assist (UE has full AROM, uses for all activities and fine motor tasks, but remains assistive with mild awkwardness and weakness) for ADL/IADLs and tabletop tasks for improved functional performance of life roles  · Pt will demo G comprehension of adaptive equipment (long handled reacher/sponge/shoehorn, toileting aid) use in clinic to improve independence in ADL management in home environment  · Pt will increase proprioception of LUE hand to target for improved functional reach vision occluded with ADL tasks  x 75% accuracy   · Pt will require < moderate assist for ADLs (dressing, showering, toileting, grooming) for improved independence with all self-care tasks  · Pt will be able to manage all medications, laundry, and cooking with < Moderate assist from son in order to increase IADL independence         Motor Long Term Goals (12 weeks)     Strength/Endurance  ·  Pt will increase LUE proximal shoulder strength to 5/5  through the use of strengthening exercises and HEP for improving performance during ADLs/IADLs for eventual return to life roles    · Pt will increase LUE distal forearm/wrist strength to 5/5 through the use of strengthening exercises and HEP for eventual return to life roles and independence with ADL/IADLs  · Pt will demo with G tolerance to supine, seated, and in stance exercise x 25-35 minutes with minimal rest breaks required for increased engagement in life roles and increased independence with ADL/IADLs  FMC/GMC   · Pt will increase LUE rate of manipulation by 15% for all FM tests for improved functional performance with salient tasks   · Pt will demo improved motor learning of constructional and new motor actions for improved b/l coordination and motor planning evident by 90% accuracy for fxnl ADL/IADL performance    Functional Performance   · Pt will increase LUE to fully functional assist (completely functional, used as dominant UE) for ADL/IADL and tabletop tasks for improved functional performance of life roles  · Pt will demo excellent comprehension of adaptive equipment (long handled reacher/sponge/shoehorn, toileting aid) use in clinic to improve independence in ADL management in home environment  · Pt will increase proprioception of LUE hand to target for improved functional reach vision occluded with ADL tasks  x 90% accuracy  · Pt will completed ADLs (dressing, showering, toileting, grooming) with modified independence for improved independence with all self-care tasks  · Pt will be able to manage all medications, laundry, and cooking with Modified Williams in order to increase IADL independence         Subjective    SUBJECTIVE: Per pt's son "I think she needs more strength in her legs and she has difficulties with everything since her stroke"    PATIENT GOAL: "I would like to have 100% again"     Patient-Specific Functional Scale   Task is scored 0 (unable to perform activity) to 10 (ability to perform activity independently)    Activity Date:10/10/2022 Date:   1  Dressing (shirts and pants) 2-3/10 Pt unable to dress self without assistance    2  Bathing 4/10    3  Strength of LUE Arm 5/10  Leg 2/10    4  Grasping/pinching 5/10 Pt has difficulty with picking up pills and small objects        HISTORY OF PRESENT ILLNESS:     Violeta Waterman is a 80 y o  female who was referred to Occupational Therapy s/p  History of CVA (cerebrovascular accident) [Z86 73]  Pt with recent hospitalization from [place] [address] from 8/12/2022-8/19/2022 presenting with weak LUE, flaccid LLE, left facial droop, mild dysarthria, and mild sensory deficits on the left side  Pt reported she had difficulty moving from her bed on 8/12/2022, son called ambulance  CTA revealed near occlusive thrombus at R MCA bifurcation  Pt was started on stroke protocol heparin gtt (drops)  Pt changed medications from Xarelto to Pradaxa (think Xarelto failure was cause of stroke)  Pt d/c to [facility] rehab where she received OT, PT, ST from 8/13/2022-9/13/2022  Pt transitioned to [facility] where she was d/c 9/24/2022 home to her family  Pt has PMH of a-fib on Xarelto (now changed) with MRI-incompatible pacemaker, osteoarthritis in both wrists, HLD, and HTN  Pt resides in an apartment on the 8th floor with GARRY but uses the elevator  With son close by, pt is using walker to get around her apartment  Pt's son is currently living with her for assistance with ADLs/IADLs  Pt is completing ADLs with assistance from son  Pt is currently using DME (walker, w/c, toilet commode, shower chair)  Looking into getting a medical bed for repositioning and upright posture due to risk for aspiration/coughing  Pt is not currently using any AE  Pt likes to shop, go to [employer], watching tv  Pt has 3 grandchildren  Pt is currently retired but used to work with [employer]  Pt was independent prior to the stroke  Pt son applied for pt to get into a lower level apartment in case of emergencies  Possibly looking for HHA        Pt presenting with ongoing difficulties with: ADLs/IADLs (bathing, eating, dressing), pinching pills, doing laundry, vision blurriness, reaching with LUE and LUE strength  PMH:   Past Medical History:   Diagnosis Date   • A-fib ([address] )    • Anemia    • Arthritis    • Breast pain, right    • Chest pain on breathing    • Colon polyp    • Cough    • Diverticulosis    • Encounter for screening colonoscopy    • H/O sick sinus syndrome    • Heart murmur    • High cholesterol    • History of atrial fibrillation     Rate ontrolled  On xarelto 15mg (creatinine 50) Followed by Dr Rose Aguilera with Cardiology    • History of weight loss     Report loose fitting clothes  Weight stable (3lbs difference)  Last colo showed small tubular adenoma x2  Breast biopsy last showed fibrocystic changes  TSH wnl  Will check cbc, bmp, spep         • Hx of long term use of blood thinners    • Hypertension    • Irregular heart beat    • Pacemaker    • Preop examination    • Shortness of breath    • Viral bronchitis        Pain Levels   Restin/10 - Pain in her L hip/leg   Pain is mainly when she is in bed trying to get comfortable    With Activity:   Some pain in her leg (numbness) and decreased balance after walking with walker    Objective    Impairment Observations:    SEGUNDO CHANG Comments           UPPER EXTREMITY FUNCTION   Intact Impaired Dominant Hand: Left     /PINCH STRENGTH             Dynamometer    - Gross Grasp 36 lbs 35 lbs low   Pinch Meter     - Pincer 8 lbs 8 lbs low    - Tripod 10 lbs 10 lbs low    - Lateral 9 lbs  8 lbs low     AROM (Seated)         Difficulty following directions with movements    Elevation Full 1/2    Shoulder FF Full  130*     Shoulder Ext Full  65*     Shoulder Abd Full  Full     Shoulder Add Full  Full     Horizontal Abd Full  Full     Horizontal Add Full  WFL     Elbow Flex Full  WFL     Elbow Ext Full  Full     Pronation Full  Full     Supination Full  Full     Wrist Flex Full  40*     Wrist Ext Full  21*     Digit Flex Full  Full     Digit Ex Full  Full     Hook Grasp Full  Full     Subluxation  None  None MMT             Shoulder FF 4+/5 3+/5    Shoulder Ext 4+/5 4/5    Shoulder Abduction 4/5 4/5    Shoulder Adduction 4/5 3+/5    Elbow Flex 4+/5 4+/5    Elbow Ext 4+/5 4/5    Wrist Flex 4-/5 4-/5    Wrist Ext 4/5 4/5      SENSATION      Monofilament Testing  Normal: 2 83 mm    Diminished light touch: 3 61 mm    Diminished protective sensation: 4 31 mm    Loss of protective sensation: 4 56    Complete loss of sensation / deep pressure: 6 65 3 61 mm 3 61 mm Pt unable to feel 2 83 mm on L/RUE transitioned to 3 61mm and able to feel on entire LUE/RUE   Sharp / Dull  Intact Impaired Pt unable to differentiate between sharp/dull     Proprioception Intact Impaired    Hot/Cold Temp Intact Intact    Stereognosis  Intact Intact      COORDINATION      Opposition Intact WFL WFL but slow movements with LUE   Finger to Nose WFL Impaired Slow movements with vision occluded   Rapid Alternating Movement Impaired Impaired Slow movements, uncoordinated with both L and R hand   9 Hole Peg Test 24 seconds 32 seconds low   Fxnl Dexterity Test 32 seconds    Used board 100% of the time to assist in rotation of pegs 58 seconds    Used board 100% to rotate pegs, with 50% supinated of forearm low   Moose Hand Function Test         - Handwriting    not examined      - Card Turning   not examined      - Small Item p/u   not examined      - Simulated Feeding   not examined      - Stacking Checkers   not examined      - Moving Light Objects   not examined      - Moving Heavy Objects   not examined   Concentric Circles Test (tremors)          TONE: MODIFIED BERTIN SCALE     Will further assess*   No increase in muscle tone (0)      Slight Increase in muscle tone with catch and release or min resist at end range (1)      Slight Increase in muscle tone with catch and release, followed by min resistance through remainder of range (1+)      Increased muscle tone through full range, able to be moved easily (2)      Considerable increase in tone, difficult to move (3)      Rigid in Flexion/Extension (4)              Today's session:  Thera ex: Pt engaged in strengthening exercises at edge of mat with #3 dowel bar focusing on ROM, UE strengthening, and increased mobility for carryover with ADL/IADLs and home management tasks  Pt completed shoulder press, bicep curls (supinated and pronated), and shoulder flexion 2 sets 10x each  Pt reported fatigue with L arm but no pain  Pt demo needed for visual cuing from OTS throughout exercises due to decreased ability to follow directions  Thera act: Pt transitioned to engaging in parquetry puzzle on the velcro mirror board with #1 wrist weight on LUE  Pt required to reach to L side into full extension and reach across midline to place pieces in correct location according to visual  Pt then removed pieces and placed pieces into container behind her with vision occluded  Pt demo good tolerance to reaching with 2 drops while placing pieces due to decrease in hand manipulation to rotate to velcro side  Pt concluded in activity with resistive yellow clip in L hand and orange webbed basket to reach for cotton balls and place the specific colors in the colored cones focusing on concentration, cognition, FMC, and hand/target accuracy  Pt required taking 2 breaks during the activity and increased time to complete due to decreased hand strength and fatigue        OTHER PLANNED THERAPY INTERVENTIONS:    Supine, seated, and in stance neuro re-ed  Task-Oriented Training  Tricep AG  Hot Packs for pain  FMC/prehension  GMC  Proximal to distal teaming  Timed Trials  Manual tx  Hand to target  Sensory re-ed (Cutaneous/Proprioceptive)  Seated functional reach: crossing midline  Supine place and hold   WBearing strategies   Closed chain activities  Open chain activities  HEP  Splint (PRN*)    INTERVENTION COMMENTS:  Diagnosis: History of CVA (cerebrovascular accident) [Z86 73]  Precautions: Fall risk, pacemaker, No E-STIM   FOTO: Completed  Insurance: Payor: Roselee Quince MEDICARE [facility] REP / Plan: Jono Sampson / Product Type: Medicare HMO /   3 of 16 visits, PN due 11/10/2022   POC ends 01/02/2023

## 2022-10-20 ENCOUNTER — OFFICE VISIT (OUTPATIENT)
Dept: GASTROENTEROLOGY | Facility: CLINIC | Age: 82
End: 2022-10-20
Payer: MEDICARE

## 2022-10-20 VITALS
DIASTOLIC BLOOD PRESSURE: 70 MMHG | BODY MASS INDEX: 24.1 KG/M2 | HEIGHT: 63 IN | TEMPERATURE: 98.6 F | HEART RATE: 67 BPM | WEIGHT: 136 LBS | SYSTOLIC BLOOD PRESSURE: 138 MMHG

## 2022-10-20 DIAGNOSIS — K21.9 GASTROESOPHAGEAL REFLUX DISEASE, UNSPECIFIED WHETHER ESOPHAGITIS PRESENT: Primary | ICD-10-CM

## 2022-10-20 DIAGNOSIS — Z86.010 PERSONAL HISTORY OF COLONIC POLYPS: ICD-10-CM

## 2022-10-20 PROCEDURE — 99214 OFFICE O/P EST MOD 30 MIN: CPT | Performed by: PHYSICIAN ASSISTANT

## 2022-10-20 NOTE — PROGRESS NOTES
[facility] - Outpatient Follow-up Note  Chalino Trujillo 80 y o  female MRN: [id number]  Encounter: [id number]      Assessment and Plan    1  Dysphagia  2  Belching   The patient suffered a stroke in August   She states that after her stroke she was having significant belching and difficulty eating and drinking  She underwent speech evaluation who did a video barium swallow with no significant findings  She was started on simethicone which made her symptoms worse  Since discontinuing this she states that her symptoms have been significantly improving  Currently no dysphagia or trouble eating or drinking   - no further workup required at this time  - continue pantoprazole for underlying GERD    3  Personal history of colon polyps   Multiple prior colonoscopies with residual ascending colon polyp that was 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  The patient is currently doing very well without any alarm symptoms  - per colorectal will continue with scheduled colonoscopies per Dr Trinidad Dancer, at this time the patient is not interested in any further colonoscopies but she would like to follow up in the future to consider if further colonoscopies are needed, will have her follow-up with Dr Trinidad Dancer to discuss the risks and benefits of further colonoscopies    Follow up in 6 months     ______________________________________________________________________    History of Present Illness  Chalino Trujillo is a 80 y o  female with HTN, HLD, CKD, CAD, and recent stroke here for follow up evaluation of colon polyps  She has had multiple colonoscopies with residual ascending colon polyp that was 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  The patient is currently doing very well without any alarm symptoms  She does have complaints of belching since her stroke    She states that initially it was making it difficult for her to eat and drink  Speech did see her in the hospital and did a Barium swallow which noted no significant findings  She was given simethicone which she states made her symptoms worse  Since discontinuing the simethicone her belching has been significantly improving and she is having no trouble eating or drinking  She denies any dysphagia  She does have underlying GERD well controlled on Protonix 40 mg daily  [employer]ese interpretor utilized       Review of Systems   Constitutional: Negative for activity change, appetite change, chills, fatigue, fever and unexpected weight change  HENT: Negative for trouble swallowing  Gastrointestinal: Negative for abdominal distention, abdominal pain, anal bleeding, blood in stool, constipation, diarrhea, nausea, rectal pain and vomiting  Musculoskeletal: Positive for gait problem  Past Medical History  Past Medical History:   Diagnosis Date   • A-fib ([address] )    • Anemia    • Arthritis    • Breast pain, right    • Chest pain on breathing    • Colon polyp    • Cough    • Diverticulosis    • Encounter for screening colonoscopy    • H/O sick sinus syndrome    • Heart murmur    • High cholesterol    • History of atrial fibrillation     Rate ontrolled  On xarelto 15mg (creatinine 50) Followed by   [facility] with Cardiology    • History of weight loss     Report loose fitting clothes  Weight stable (3lbs difference)  Last colo showed small tubular adenoma x2  Breast biopsy last showed fibrocystic changes  TSH wnl  Will check cbc, bmp, spep         • Hx of long term use of blood thinners    • Hypertension    • Irregular heart beat    • Pacemaker    • Preop examination    • Shortness of breath    • Viral bronchitis        Past Social history  Past Surgical History:   Procedure Laterality Date   • BREAST BIOPSY Right 08/17/2006    ultrasound guided Percutaneous Needle Core -Benign   • CATARACT EXTRACTION     • CATARACT EXTRACTION W/ INTRAOCULAR LENS IMPLANT, BILATERAL     • COLONOSCOPY     • COLONOSCOPY N/A 5/22/2018    Procedure: COLONOSCOPY;  Surgeon: Gaby Lawrence DO;  [facility]; Service: Gastroenterology   • Radha Obrien / Geovany Pride / Foster Resendiz     • LEG SURGERY Right     as a child after a fall   • MAMMO STEREOTACTIC BREAST BIOPSY RIGHT (ALL INC) Right 10/2014    benign   • WY CMBND ANTERPOST COLPORRAPHY W/CYSTO N/A 3/17/2016    Procedure: COLPORRHAPHY ANTERIOR POSTERIOR ;  Surgeon: Justin Overton MD;  [facility];  Service: Gynecology   • WY COLONOSCOPY FLX DX W/COLLJ SPEC WHEN PFRMD N/A 4/4/2019    Procedure: COLONOSCOPY;  Surgeon: Gaby Lawrence DO;  [facility]; Service: Gastroenterology   • WY CYSTOURETHROSCOPY N/A 3/17/2016    Procedure: CYSTOSCOPY;  Surgeon: Justin Overton MD;  [facility];  Service: Gynecology   • WY ESOPHAGOGASTRODUODENOSCOPY TRANSORAL DIAGNOSTIC N/A 4/16/2018    Procedure: EGD AND COLONOSCOPY;  Surgeon: Gaby Lawrence DO;  [facility]; Service: Gastroenterology   • WY LAP,SURG,COLECTOMY, PARTIAL, W/ANAST Right 1/13/2022    Procedure: Diagnostic laparoscopy, laparscopic right colectomy;  Surgeon: Katerin Leone MD;  [facility];  Service: Colorectal   • WY REVAGINAL PROLAPSE,SACROSP LIG N/A 3/17/2016    Procedure: COLPOPEXY VAGINAL EXTRAPERITONEAL (VEC) ANTERIOR ;  Surgeon: Justin Overton MD;  [facility];  Service: Gynecology   • WY SLING OPER STRES INCONTINENCE N/A 3/17/2016    Procedure: INSERTION PUBOVAGINAL SLING SINGLE INCISION ;  Surgeon: Justin Overton MD;  [facility];  Service: Gynecology     Social History     Socioeconomic History   • Marital status:       Spouse name: Not on file   • Number of children: Not on file   • Years of education: Not on file   • Highest education level: Not on file   Occupational History   • Occupation: retired   Tobacco Use   • Smoking status: Never Smoker   • Smokeless tobacco: Never Used   Vaping Use   • Vaping Use: Never used   Substance and Sexual Activity   • Alcohol use: Never   • Drug use: No   • Sexual activity: Not Currently     Comment:    Other Topics Concern   • Not on file   Social History Narrative    Housing, household, and economic circumstances      Social Determinants of Health     Financial Resource Strain: Low Risk    • Difficulty of Paying Living Expenses: Not hard at all   Food Insecurity: No Food Insecurity   • Worried About Running Out of Food in the Last Year: Never true   • Ran Out of Food in the Last Year: Never true   Transportation Needs: No Transportation Needs   • Lack of Transportation (Medical): No   • Lack of Transportation (Non-Medical):  No   Physical Activity: Not on file   Stress: Not on file   Social Connections: Not on file   Intimate Partner Violence: Not on file   Housing Stability: Low Risk    • Unable to Pay for Housing in the Last Year: No   • Number of Places Lived in the Last Year: 1   • Unstable Housing in the Last Year: No     Social History     Substance and Sexual Activity   Alcohol Use Never     Social History     Substance and Sexual Activity   Drug Use No     Social History     Tobacco Use   Smoking Status Never Smoker   Smokeless Tobacco Never Used       Past Family History  Family History   Problem Relation Age of Onset   • Diabetes Mother    • Breast cancer Sister 48   • No Known Problems Father    • No Known Problems Maternal Grandmother    • No Known Problems Maternal Grandfather    • No Known Problems Paternal Grandmother    • No Known Problems Paternal Grandfather    • No Known Problems Daughter    • No Known Problems Son    • No Known Problems Sister    • No Known Problems Sister    • No Known Problems Brother    • No Known Problems Brother    • No Known Problems Sister    • No Known Problems Paternal Aunt    • No Known Problems Paternal Aunt    • No Known Problems Paternal Aunt    • No Known Problems Paternal Aunt Current Medications  Current Outpatient Medications   Medication Sig Dispense Refill   • dabigatran etexilate (Pradaxa) 150 mg capsu Take 1 capsule (150 mg total) by mouth 2 (two) times a day 60 capsule 2   • Incontinence Supply Disposable (RA Adult Wipes) MISC Use 4 (four) times a day 64 each 10   • losartan (COZAAR) 50 mg tablet Take 1 tablet (50 mg total) by mouth daily 30 tablet 2   • nebivolol (BYSTOLIC) 5 mg tablet Take 1 tablet (5 mg total) by mouth daily 90 tablet 3   • pantoprazole (PROTONIX) 40 mg tablet Take 1 tablet (40 mg total) by mouth daily in the early morning 30 tablet 2   • simethicone (MYLICON,GAS-X) 053 MG CAPS Take 1 capsule (125 mg total) by mouth 3 (three) times a day before meals 28 capsule 0   • tamsulosin (FLOMAX) 0 4 mg Take 1 capsule (0 4 mg total) by mouth daily with dinner 30 capsule 2   • verapamil (CALAN-SR) 240 mg CR tablet Take 1 tablet (240 mg total) by mouth daily at bedtime 90 tablet 3   • acetaminophen (TYLENOL) 325 mg tablet Take 2 tablets (650 mg total) by mouth every 6 (six) hours as needed for mild pain (Patient not taking: No sig reported)  0   • benzonatate (TESSALON PERLES) 100 mg capsule Take 2 capsules (200 mg total) by mouth 3 (three) times a day as needed for cough (Patient not taking: No sig reported)     • bisacodyl (DULCOLAX) 10 mg suppository Insert 1 suppository (10 mg total) into the rectum daily as needed (Constipation   Second line and refer to bowel protocol) (Patient not taking: No sig reported) 12 suppository 0   • Blood Pressure Monitoring (Blood Pressure Cuff) MISC Use every other day For HTN (Patient not taking: No sig reported) 1 each 0   • dabigatran etexilate (PRADAXA) 150 mg capsu Take 1 capsule (150 mg total) by mouth 2 (two) times a day PRICE CHECK DO NOT FILL (Patient not taking: Reported on 10/5/2022) 60 capsule 0   • guaiFENesin (MUCINEX) 600 mg 12 hr tablet Take 1 tablet (600 mg total) by mouth every 12 (twelve) hours (Patient not taking: No sig reported)  0   • magnesium oxide (MAG-OX) 400 mg TAKE 1 TABLET (400 MG TOTAL) BY MOUTH DAILY (Patient not taking: No sig reported) 30 tablet 2   • melatonin 3 mg Take 2 tablets (6 mg total) by mouth every evening (Patient not taking: No sig reported)  0   • menthol-methyl salicylate (BENGAY) 63-51 % cream Apply topically 2 (two) times a day (Patient not taking: No sig reported)  0   • nitroglycerin (NITROSTAT) 0 4 mg SL tablet Place 1 tablet (0 4 mg total) under the tongue every 5 (five) minutes as needed for chest pain (Patient not taking: No sig reported)  0   • phenol (CHLORASEPTIC) 1 4 % mucosal liquid Apply 1 spray to the mouth or throat every 2 (two) hours as needed (cough) (Patient not taking: No sig reported)  0   • polyethylene glycol (MIRALAX) 17 g packet Take 17 g by mouth daily as needed (First line and refer to bowel protocol) (Patient not taking: No sig reported)  0   • polyvinyl alcohol (LIQUIFILM TEARS) 1 4 % ophthalmic solution Administer 1 drop to the right eye as needed for dry eyes (Patient not taking: No sig reported) 15 mL 0   • rosuvastatin (CRESTOR) 10 MG tablet Take 0 5 tablets (5 mg total) by mouth daily (Patient not taking: Reported on 10/20/2022) 15 tablet 2     Current Facility-Administered Medications   Medication Dose Route Frequency Provider Last Rate Last Admin   • [START ON 11/5/2022] cyanocobalamin injection 1,000 mcg  1,000 mcg Intramuscular Q30 Days Becky Torres MD           Allergies  Allergies   Allergen Reactions   • Other Itching     Bandaids   • Tape [Medical Tape] Itching         The following portions of the patient's history were reviewed and updated as appropriate: allergies, current medications, past medical history, past social history, past surgical history and problem list       Vitals  There were no vitals filed for this visit        Physical Exam  Constitutional   General appearance: Patient is seated and in no acute distress, well appearing and well nourished  Head and Face   Head and face: Normal     Eyes   Conjunctiva and lids: No erythema, swelling or discharge  Anicteric  Ears, Nose, Mouth, and Throat   Hearing: Normal     Neck: Supple, trachea midline  Pulmonary   Respiratory effort: No increased work of breathing or signs of respiratory distress  Cardiovascular   Examination of extremities for edema and/or varicosities: Normal     Musculoskeletal   Gait and station: Wheel chair  Skin   Skin and subcutaneous tissue: Warm, dry, and intact  No visible jaundice, lesions or rashes  Psychiatric   Judgment and insight: Normal  Recent and remote memory:  Normal  Mood and affect: Normal      Results  No visits with results within 1 Day(s) from this visit  Latest known visit with results is:   Orders Only on 10/18/2022   Component Date Value   • Supplier Name 10/18/2022 [employer]    • Supplier Phone Number 10/18/2022 [phone]    • Order Status 10/18/2022 Delivery Successful    • Delivery Request Date 10/18/2022 10/18/2022    • Date Delivered  10/18/2022 10/19/2022    • Supplier Name 10/18/2022 10/19/2022    • Item Description 10/18/2022 Alternating Pressure Pad (NISHANT)        Radiology Results  No results found  Orders  No orders of the defined types were placed in this encounter

## 2022-10-20 NOTE — TELEPHONE ENCOUNTER
Called and spoke to Yessica from [facility] to verify if the correct item was sent to the patient's home  Yessica stated they had sent over the alternating pressure pad over to patient's house and not a hospital mattress as patient stated  Yessica stated the alternating pressure pad is not a mattress but a pad that goes over patient's mattress  Called patient's son Donal (on consent form) to clarify as to what item did the patient received  Donal stated his mother received a hospital mattress instead of the alternating pressure pad and will need a hospital bed as this wont fit on patient bed frame  I stated to Donal I had spoken to Yessica from [facility] to which she verify the item that was sent was an alternating pressure pad and not a hospital mattress  I stated to patient's son the Alternating pressure pad would be over on top of patient's bed as a cushion  Donal understood and stated he will have to visit his mother to verify if they received the correct item as he doesn't not live with her  Donal stated he will give the office a call back if they have any issues or questions

## 2022-10-21 ENCOUNTER — OFFICE VISIT (OUTPATIENT)
Dept: PHYSICAL THERAPY | Facility: CLINIC | Age: 82
End: 2022-10-21
Payer: MEDICARE

## 2022-10-21 ENCOUNTER — OFFICE VISIT (OUTPATIENT)
Dept: OCCUPATIONAL THERAPY | Facility: CLINIC | Age: 82
End: 2022-10-21
Payer: MEDICARE

## 2022-10-21 DIAGNOSIS — Z86.73 HISTORY OF CVA (CEREBROVASCULAR ACCIDENT): Primary | ICD-10-CM

## 2022-10-21 DIAGNOSIS — Z74.09 IMPAIRED FUNCTIONAL MOBILITY, BALANCE, GAIT, AND ENDURANCE: ICD-10-CM

## 2022-10-21 PROCEDURE — 97112 NEUROMUSCULAR REEDUCATION: CPT

## 2022-10-21 PROCEDURE — 97110 THERAPEUTIC EXERCISES: CPT

## 2022-10-21 PROCEDURE — 97116 GAIT TRAINING THERAPY: CPT

## 2022-10-21 PROCEDURE — 97530 THERAPEUTIC ACTIVITIES: CPT

## 2022-10-21 NOTE — TELEPHONE ENCOUNTER
MSW phoned patient's , Kim Benson, for an update this date at [phone]  Dragan Chapman stated that she has not heard outcome of the appeal as of yet  Dragan Chapman stated that whoever initiated the appeal (patient/son) will be called to schedule a hearing, so they would likely know more before she would  Dragan Chapman did confirm receipt of the neurology letter of medical necessity, and stated that she did forward it to the appeal department  Dragan Chapman stated that she did not receive the letter of medical necessity from patient's PCP office  MSW will ask the [facility]  if she can refax same  Dragan Chapman stated that she will update this writer as able  MSW phoned patient's son, Zuleima Hsu, at [phone]  He reports that they have not heard anything about the appeal yet  MSW did advised that he/patient should be alert to calls from the insurance to set up a hearing for the appeal  Zuleima Hsu stated that they would be alert to same

## 2022-10-21 NOTE — PROGRESS NOTES
Daily Note     Today's date: 10/21/2022  Patient name: Raymond Gibson  : 1940  MRN: [id number]  Referring provider: Luana Carvajal PA-C  Dx:   No diagnosis found  Subjective: Patient reports she felt a little tired after last sessions      Objective: See treatment diary below      Assessment: Tolerated treatment well  Patient demonstrated fatigue post treatment, exhibited good technique with therapeutic exercises and would benefit from continued PT  Was elizabeth to ambulate increased distance in hallway with FWW, may trial treadmill with SOLO next session  Improved foot clearance with increased repetition with exercises during session  Continue to progress as tolerated  Plan: Continue per plan of care        Precautions: HTN, a-fib, pacemaker      Manuals 10/10 10/13 10/19 10/21                                                   Neuro Re-Ed           Standing marching           sidesteps  // bars (short) x 4 laps // bars (short) x 4 laps 2# AW on L // bars (short) x 4 laps with 2# AW on L LE       HKM  // bars (short) x 3 laps // bars (sbort) x 4 laps with 2# AW on L // bars (short) x 4 laps with 2# AW on L       Sit to stand with LE elevated  R LE on 4" step, x 10 reps, therapist assist in front  R LE on 4" step, x 15 reps, therapist in front  R LE on 4" step x 15 reps, hold at top without UE support, eccentric lower with therapist in front with CGA       Step taps  BUE support on 6-inch step x 20 reps each L UE support on 6-inch step x 10 reps each with 2# AW on L LE        backwards   // bars (short) x 3 laps with 2# AW on L LE // bars (short) x 3 laps with 2# AW on L LE       Step over jim    Forward/back with 6-inch jim with L LE with 2# AW x 10 reps, min A to clear stepping back                                        Ther Ex           Sit to stand  With FWW in front x 10 reps, tactile cues at L LE for quad activation         nustep           treadmill Ther Activity           Stand pivot transfer                      Gait Training           With FWW in hallway  1 trial of 300 feet with CGA    1 trial of 200 feet with HHA CGA-min A 1 trial of 400 feet with FWW and CGA    1 trial of 400 feet with rollator and CGA 1 trial of 500 feet with FWW and CGS/CS       stairs  Up and over staircase with bilat handrails, x 3 laps  Step over step pattern on 4-inch, step to pattern on 6-inch Up and over staircase with bilat handrails x 5 laps  Step over step pattern on each direction, cues for foot positioning  2# AW on L LE        Modalities            POC, neuroplasticity concepts, safety                       Access Code: [email or web address]  URL: [email or web address]  Date: 10/13/2022  Prepared by: Katelin Flores    Exercises  · Seated Hamstring Stretch with Strap - 1 x daily - 7 x weekly - 3 sets - 30 hold  · Seated Gastroc Stretch with Strap - 1 x daily - 7 x weekly - 3 sets - 30 hold  · Seated Long Arc Quad - 1 x daily - 5 x weekly - 3 sets - 10 reps  · Seated March - 1 x daily - 5 x weekly - 3 sets - 10 reps  · Seated Heel Raise - 1 x daily - 5 x weekly - 3 sets - 10 reps  · Seated Hip Adduction Isometrics with Ball - 1 x daily - 5 x weekly - 3 sets - 10 reps - 5 hold  · Seated Hip Abduction - 1 x daily - 5 x weekly - 3 sets - 10 reps

## 2022-10-21 NOTE — PROGRESS NOTES
Occupational Therapy Daily Note:    Today's date: 10/21/2022  Patient name: Deepa Garrett  : 1940  MRN: [id number]  Referring provider: Jolie Willett PA-C  Dx:   Encounter Diagnosis   Name Primary? • History of CVA (cerebrovascular accident) Yes       Subjective: No, no pain  Objective: Pt engaged in skilled OT treatment session with focus on UE strengthening, UE endurance, FMC/GMC, FMS/GMS and body awareness to increase engagement, endurance, tolerance, and independence with daily ADL and IADL tasks  CPT Code Minutes                                           Task Details        Therapeutic Activity 25  Pt completed bimanual, fine motor and hand to target accuracy focused task  Pt completed geoboard task with rubberbands on template board  Pt unable to complete with only LUE, pt instructed to utilize b/l UE to inc task accuracy/success  Neuro Re-Ed 20 Pt completed seated task focusing on neuro motor re-ed, body awareness, fine motor control & coordination, & functional reaching task  Pt crossed midline to retrieve parquetry puzzle blocks and place to vertical mirror on parquetry template, with template positioned to require >100* of shoulder flexion during task  Pt donned 2# weight cuff to distal UE  Required 1 rest break d/t fatigue during task  Therapeutic Exercise 15  Pt performed supine therex w/ 2 0# wt bar to inc GMC/GMS, proximal UE strength/endurance and inc fluidity of movement  Completed 3 sets x 10 reps chest presses, forward/backward rows, and shoulder flexion, achieving near full ROM  No reported pain or fatigue during task  Manual          Self Care           Assessment: Tolerated treatment well  Pt demo difficulty fatigue and bradykinesia with functional reaching with massed practice  Pt demo improved bimanaul skills, though consistently favors right side while completing all tasks   Patient would benefit from continued skilled OT with focus on proprioception and coordination tasks      Plan: Continued skilled OT per POC    INTERVENTION COMMENTS:  Diagnosis: History of CVA (cerebrovascular accident) [Z86 [id number]]  Precautions: Fall risk, pacemaker, No E-STIM   FOTO: Completed  Insurance: Payor: Jayashree N Aftab Vieira / Plan: Wilian Posada / Product Type: Medicare HMO /   4 of 16 visits, PN due 11/10/2022   POC ends 01/02/2023

## 2022-10-21 NOTE — TELEPHONE ENCOUNTER
LATE ENTRY FROM 10/20/22:    MSW spoke with Bhaskar Rollins at [employer]  She confirmed that they received the letter from this office and that patient's profile was updated to reflect that she now travels via wheelchair and requires an escort  Patient/family will take over making transportation arrangements for patient to get to her upcoming appointments  MSW will be available to patient/son as needed

## 2022-10-24 ENCOUNTER — OFFICE VISIT (OUTPATIENT)
Dept: PHYSICAL THERAPY | Facility: CLINIC | Age: 82
End: 2022-10-24
Payer: MEDICARE

## 2022-10-24 ENCOUNTER — OFFICE VISIT (OUTPATIENT)
Dept: OCCUPATIONAL THERAPY | Facility: CLINIC | Age: 82
End: 2022-10-24
Payer: MEDICARE

## 2022-10-24 DIAGNOSIS — Z86.73 HISTORY OF CVA (CEREBROVASCULAR ACCIDENT): Primary | ICD-10-CM

## 2022-10-24 DIAGNOSIS — Z74.09 IMPAIRED FUNCTIONAL MOBILITY, BALANCE, GAIT, AND ENDURANCE: ICD-10-CM

## 2022-10-24 DIAGNOSIS — Z74.1 NEED FOR ASSISTANCE WITH PERSONAL CARE: ICD-10-CM

## 2022-10-24 PROCEDURE — 97110 THERAPEUTIC EXERCISES: CPT

## 2022-10-24 PROCEDURE — 97150 GROUP THERAPEUTIC PROCEDURES: CPT

## 2022-10-24 PROCEDURE — 97116 GAIT TRAINING THERAPY: CPT

## 2022-10-24 PROCEDURE — 97112 NEUROMUSCULAR REEDUCATION: CPT

## 2022-10-24 NOTE — PROGRESS NOTES
Occupational Therapy Daily Note:    Today's date: 10/24/2022  Patient name: Elliot Falk  : 1940  MRN: [id number]  Referring provider: Ulises Elaine PA-C  Dx:   Encounter Diagnoses   Name Primary? • History of CVA (cerebrovascular accident) Yes   • Need for assistance with personal care         Patient was treated by SCOTT Amezcua under the supervision of [address] [place] [address], OTR/L  Time:   2:30-3   3-310 Group  310-330 Unsup    Subjective: "I still want to get stronger in my left hand"    Objective: Pt engaged in skilled OT treatment session with focus on UE strengthening, UE endurance, FMC/GMC, FMS/GMS and body awareness to increase engagement, endurance, tolerance, and independence with daily ADL and IADL tasks  CPT Code Minutes                                           Task Details        Therapeutic Activity   Pt provided a HEP with resistive yellow/red putty for focus on UE strength, FMC/FMS, and UE endurance  Exercises     Exercises  · Putty Squeezes - 1 x daily - 7 x weekly - 3 sets - 10 reps  · Rolling Putty on Table - 1 x daily - 7 x weekly - 3 sets - 10 reps  · Tip Pinch with Putty - 1 x daily - 7 x weekly - 3 sets - 10 reps  · Key Pinch with Putty - 1 x daily - 7 x weekly - 3 sets - 10 reps  · Finger Lumbricals with Putty - 1 x daily - 7 x weekly - 3 sets - 10 reps  · 3-Point Pinch with Putty - 1 x daily - 7 x weekly - 3 sets - 10 reps  · Finger Pinch and Pull with Putty - 1 x daily - 7 x weekly - 3 sets - 10 reps               Neuro Re-Ed               Therapeutic Exercise  Pt engaged in strengthening exercises with #2 dowel bar seated at edge of mat to increase GMC, UE strength, endurance, flexibility, and mobility     Exercises completed  Bicep curls (supinated): 2 sets x 10  Bicep curls (pronated): 2 sets x 10  Shoulder flexion 2 sets x 10  Shoulder Abduction 2 sets x 10     Pt tolerated 4 min x 2 prograde/retrograde motions at 1 0 resist sitting on UBE with focus on increasing proximal UE strength, endurance, act tolerance, cardiopulmonary endurance, grasp on handles and ROM to inc indep and safety with ADL/IADL fxn and fxnl reaching tasks  Manual          Self Care           Assessment: Tolerated treatment well  Pt demo fair tolerance to strengthening exercises needing increases verbal cuing for proper positioning and eccentric/concentric control due to language barrier and LUE weakness  Pt demo decreased control during shoulder flexion due to increased LUE weakness present  Pt demo positive response to putty exercises stating she will do them at home  Pt provided a handout with putty exercises in Pakistani for home use  Pt demo increased ability to do putty exercises with visual demonstration from OTS  Pt tolerated UBE well with needed increased verbal cuing for correct grasp on handles  Pt able to continue on UBE with massed practice  Pt would julia efit from increased fine motor tasks during OT sessions due to decreased FM control and weakness  Patient would benefit from continued skilled OT      Plan: Continued skilled OT per POC    INTERVENTION COMMENTS:  Diagnosis: History of CVA (cerebrovascular accident) [Z86 73]  Precautions: Fall risk, pacemaker, No E-STIM   FOTO: Completed  Insurance: Payor: [address] REP / Plan: Lg Yu / Product Type: Medicare HMO /   5 of 16 visits, PN due 11/10/2022   POC ends 01/02/2023

## 2022-10-24 NOTE — PROGRESS NOTES
Daily Note     Today's date: 10/24/2022  Patient name: Ramesh George  : 1940  MRN: [id number]  Referring provider: Kim Joy PA-C  Dx:   Encounter Diagnosis     ICD-10-CM    1  History of CVA (cerebrovascular accident)  Z86 73    2  Impaired functional mobility, balance, gait, and endurance  Z74 09                   Subjective: Patient has no new reports      Objective: See treatment diary below      Assessment: Tolerated treatment well  Patient demonstrated fatigue post treatment, exhibited good technique with therapeutic exercises and would benefit from continued PT  Trialed TM training with SOLO to increase intensity and speed, required cues to increase L step length and height, had decreased foot clearance and foot flat on L LE during ambulation at increased speed  Also trialed SPC overground, mild LOB with turning with feet at NBOS, would benefit from continued training for sequencing  Discussed recommendation for patient to start ambulating into clinic instead of using wheelchair, verbalized understanding  Continue to progress as tolerated  Fatigued post session  Plan: Continue per plan of care        Precautions: HTN, a-fib, pacemaker      Manuals 10/10 10/13 10/19 10/21 10/24                                                  Neuro Re-Ed           Standing marching           sidesteps  // bars (short) x 4 laps // bars (short) x 4 laps 2# AW on L // bars (short) x 4 laps with 2# AW on L LE       HKM  // bars (short) x 3 laps // bars (sbort) x 4 laps with 2# AW on L // bars (short) x 4 laps with 2# AW on L       Sit to stand with LE elevated  R LE on 4" step, x 10 reps, therapist assist in front  R LE on 4" step, x 15 reps, therapist in front  R LE on 4" step x 15 reps, hold at top without UE support, eccentric lower with therapist in front with CGA Cues throughout session with tactile cues for symmetry with LE placement      Step taps  BUE support on 6-inch step x 20 reps each L UE support on 6-inch step x 10 reps each with 2# AW on L LE        backwards   // bars (short) x 3 laps with 2# AW on L LE // bars (short) x 3 laps with 2# AW on L LE       Step over jim    Forward/back with 6-inch jim with L LE with 2# AW x 10 reps, min A to clear stepping back       Step up and over     8-inch step, leading with R ascending, L descending x 10 reps    6-inch step leading with L LE and back with R LE x 10 reps                            Ther Ex           Sit to stand  With FWW in front x 10 reps, tactile cues at L LE for quad activation         nustep           treadmill                                                                      Ther Activity           Stand pivot transfer                      Gait Training           Gait with SPC     SOLO  With SPC on R side, 1/4 lap x 4 laps  CGA and mod vc's for correct sequencing      With FWW in hallway  1 trial of 300 feet with CGA    1 trial of 200 feet with HHA CGA-min A 1 trial of 400 feet with FWW and CGA    1 trial of 400 feet with rollator and CGA 1 trial of 500 feet with FWW and CGS/CS 1 trial of 500 feet with FWW and CGA/CS      Gait training on TM     SOLO TM  1  2mph for 4 minutes with BUE support, cues for increased step length on L LE    HR 91, SpO2 99% post    2nd trial 1  2mph for 4 minutes      stairs  Up and over staircase with bilat handrails, x 3 laps  Step over step pattern on 4-inch, step to pattern on 6-inch Up and over staircase with bilat handrails x 5 laps  Step over step pattern on each direction, cues for foot positioning  2# AW on L LE        Modalities            POC, neuroplasticity concepts, safety    Education for increased step count, increasing intensity of exercise for neuroplasticity, discussed using FWW to come to therapy vs manual w/c                              Access Code: [email or web address]  URL: [email or web address]  Date: 10/13/2022  Prepared by: Sergio Strong  · Seated Hamstring Stretch with Strap - 1 x daily - 7 x weekly - 3 sets - 30 hold  · Seated Gastroc Stretch with Strap - 1 x daily - 7 x weekly - 3 sets - 30 hold  · Seated Long Arc Quad - 1 x daily - 5 x weekly - 3 sets - 10 reps  · Seated March - 1 x daily - 5 x weekly - 3 sets - 10 reps  · Seated Heel Raise - 1 x daily - 5 x weekly - 3 sets - 10 reps  · Seated Hip Adduction Isometrics with Ball - 1 x daily - 5 x weekly - 3 sets - 10 reps - 5 hold  · Seated Hip Abduction - 1 x daily - 5 x weekly - 3 sets - 10 reps

## 2022-10-26 ENCOUNTER — OFFICE VISIT (OUTPATIENT)
Dept: PHYSICAL THERAPY | Facility: CLINIC | Age: 82
End: 2022-10-26
Payer: MEDICARE

## 2022-10-26 DIAGNOSIS — Z86.73 HISTORY OF CVA (CEREBROVASCULAR ACCIDENT): Primary | ICD-10-CM

## 2022-10-26 DIAGNOSIS — Z74.09 IMPAIRED FUNCTIONAL MOBILITY, BALANCE, GAIT, AND ENDURANCE: ICD-10-CM

## 2022-10-26 PROCEDURE — 97112 NEUROMUSCULAR REEDUCATION: CPT

## 2022-10-26 PROCEDURE — 97530 THERAPEUTIC ACTIVITIES: CPT

## 2022-10-26 NOTE — PROGRESS NOTES
Daily Note     Today's date: 10/26/2022  Patient name: Ramesh George  : 1940  MRN: [id number]  Referring provider: Kim Joy PA-C  Dx:   Encounter Diagnosis     ICD-10-CM    1  History of CVA (cerebrovascular accident)  Z86 73    2  Impaired functional mobility, balance, gait, and endurance  Z74 09        Start Time: 1210  Stop Time: 1310  Total time in clinic (min): 60 minutes    Subjective: Patient states that she did not bring her RW due to weather conditions  Objective: See treatment diary below      Assessment: Tolerated treatment well  Olga Diver participated in skilled PT session focused on balance, gait, and endurance  Continues to required cues to increase L step length and height, had decreased foot clearance and foot flat on L LE during ambulation at increased speed  Patient demonstrates decreased L hip flexion strength during step up and hurdles with difficulty clearing  Patient would continue to benefit from skilled PT interventions to address deficits with balance, gait, and endurance  Patient demonstrated fatigue post treatment      Plan: Continue per plan of care        Precautions: HTN, a-fib, pacemaker      Manuals 10/10 10/13 10/19 10/21 10/24 1026                                                 Neuro Re-Ed           Standing marching           sidesteps  // bars (short) x 4 laps // bars (short) x 4 laps 2# AW on L // bars (short) x 4 laps with 2# AW on L LE  //bars (short)  2# L AW  X 4 laps     HKM  // bars (short) x 3 laps // bars (sbort) x 4 laps with 2# AW on L // bars (short) x 4 laps with 2# AW on L       Sit to stand with LE elevated  R LE on 4" step, x 10 reps, therapist assist in front  R LE on 4" step, x 15 reps, therapist in front  R LE on 4" step x 15 reps, hold at top without UE support, eccentric lower with therapist in front with CGA Cues throughout session with tactile cues for symmetry with LE placement Cues throughout session with tactile cues for symmetry with LE placement      Step taps  BUE support on 6-inch step x 20 reps each L UE support on 6-inch step x 10 reps each with 2# AW on L LE        backwards   // bars (short) x 3 laps with 2# AW on L LE // bars (short) x 3 laps with 2# AW on L LE       Step over jim    Forward/back with 6-inch jim with L LE with 2# AW x 10 reps, min A to clear stepping back  //bars (short)  Low hurdles (4) 2# L AW  X 2 laps    Low jim (1)  LLE w/2# AW x 10 rep Fwd/Bwd     Step up and over     8-inch step, leading with R ascending, L descending x 10 reps    6-inch step leading with L LE and back with R LE x 10 reps //bars (short)  2# L AW  6"step L ascending, R descending x 10 reps                           Ther Ex           Sit to stand  With FWW in front x 10 reps, tactile cues at L LE for quad activation         nustep           treadmill                                                                      Ther Activity           Stand pivot transfer                      Gait Training           Gait with SPC     SOLO  With SPC on R side, 1/4 lap x 4 laps  CGA and mod vc's for correct sequencing SOLO  With SPC on R side, 1/4 lap x 4 laps  And mod v c  for correct sequencing     With FWW in hallway  1 trial of 300 feet with CGA    1 trial of 200 feet with HHA CGA-min A 1 trial of 400 feet with FWW and CGA    1 trial of 400 feet with rollator and CGA 1 trial of 500 feet with FWW and CGS/CS 1 trial of 500 feet with FWW and CGA/CS      Gait training on TM     SOLO TM  1  2mph for 4 minutes with BUE support, cues for increased step length on L LE    HR 91, SpO2 99% post    2nd trial 1  2mph for 4 minutes SOLO TM   B/L UE  1 2 mph x 5 min good correction w/picking up B/L LE when scraping tread  1 2 mph x 5 min     stairs  Up and over staircase with bilat handrails, x 3 laps  Step over step pattern on 4-inch, step to pattern on 6-inch Up and over staircase with bilat handrails x 5 laps   Step over step pattern on each direction, cues for foot positioning  2# AW on L LE        Modalities            POC, neuroplasticity concepts, safety    Education for increased step count, increasing intensity of exercise for neuroplasticity, discussed using FWW to come to therapy vs manual w/c                              Access Code: [email or web address]  URL: [email or web address]  Date: 10/13/2022  Prepared by: Axel aSnchez    Exercises  · Seated Hamstring Stretch with Strap - 1 x daily - 7 x weekly - 3 sets - 30 hold  · Seated Gastroc Stretch with Strap - 1 x daily - 7 x weekly - 3 sets - 30 hold  · Seated Long Arc Quad - 1 x daily - 5 x weekly - 3 sets - 10 reps  · Seated March - 1 x daily - 5 x weekly - 3 sets - 10 reps  · Seated Heel Raise - 1 x daily - 5 x weekly - 3 sets - 10 reps  · Seated Hip Adduction Isometrics with Ball - 1 x daily - 5 x weekly - 3 sets - 10 reps - 5 hold  · Seated Hip Abduction - 1 x daily - 5 x weekly - 3 sets - 10 reps

## 2022-10-27 ENCOUNTER — TELEPHONE (OUTPATIENT)
Dept: INTERNAL MEDICINE CLINIC | Facility: CLINIC | Age: 82
End: 2022-10-27

## 2022-10-27 ENCOUNTER — HOSPITAL ENCOUNTER (OUTPATIENT)
Dept: NON INVASIVE DIAGNOSTICS | Facility: CLINIC | Age: 82
Discharge: HOME | End: 2022-10-27
Payer: MEDICARE

## 2022-10-27 DIAGNOSIS — R60.9 SWELLING: ICD-10-CM

## 2022-10-27 PROCEDURE — 93970 EXTREMITY STUDY: CPT

## 2022-10-27 NOTE — TELEPHONE ENCOUNTER
Folder Color- Blue     Name of [address]    Form to be filled out by- Dr Luigi Claire     Form to be Faxed [phone]    Patient made aware of 10 business day policy

## 2022-10-28 ENCOUNTER — OFFICE VISIT (OUTPATIENT)
Dept: CARDIOLOGY CLINIC | Facility: CLINIC | Age: 82
End: 2022-10-28
Payer: MEDICARE

## 2022-10-28 ENCOUNTER — IN-CLINIC DEVICE VISIT (OUTPATIENT)
Dept: CARDIOLOGY CLINIC | Facility: CLINIC | Age: 82
End: 2022-10-28
Payer: MEDICARE

## 2022-10-28 VITALS
SYSTOLIC BLOOD PRESSURE: 148 MMHG | HEART RATE: 77 BPM | WEIGHT: 136 LBS | DIASTOLIC BLOOD PRESSURE: 72 MMHG | BODY MASS INDEX: 24.1 KG/M2 | HEIGHT: 63 IN

## 2022-10-28 DIAGNOSIS — I27.20 PULMONARY HYPERTENSION (HCC): ICD-10-CM

## 2022-10-28 DIAGNOSIS — I48.20 CHRONIC ATRIAL FIBRILLATION (HCC): Primary | ICD-10-CM

## 2022-10-28 DIAGNOSIS — Z95.0 CARDIAC PACEMAKER IN SITU: Primary | ICD-10-CM

## 2022-10-28 PROCEDURE — 93279 PRGRMG DEV EVAL PM/LDLS PM: CPT | Performed by: INTERNAL MEDICINE

## 2022-10-28 PROCEDURE — 99214 OFFICE O/P EST MOD 30 MIN: CPT | Performed by: INTERNAL MEDICINE

## 2022-10-28 PROCEDURE — 93000 ELECTROCARDIOGRAM COMPLETE: CPT | Performed by: INTERNAL MEDICINE

## 2022-10-28 RX ORDER — POTASSIUM CHLORIDE 750 MG/1
20 TABLET, EXTENDED RELEASE ORAL DAILY
Qty: 180 TABLET | Refills: 3 | Status: SHIPPED | OUTPATIENT
Start: 2022-10-28

## 2022-10-28 RX ORDER — POTASSIUM CHLORIDE 750 MG/1
20 TABLET, EXTENDED RELEASE ORAL DAILY
Qty: 180 TABLET | Refills: 3 | Status: SHIPPED | OUTPATIENT
Start: 2022-10-28 | End: 2022-10-28 | Stop reason: SDUPTHER

## 2022-10-28 RX ORDER — FUROSEMIDE 20 MG/1
20 TABLET ORAL DAILY
Qty: 90 TABLET | Refills: 3 | Status: SHIPPED | OUTPATIENT
Start: 2022-10-28

## 2022-10-28 RX ORDER — POTASSIUM CHLORIDE 750 MG/1
10 TABLET, EXTENDED RELEASE ORAL DAILY
COMMUNITY
End: 2022-10-28

## 2022-10-28 NOTE — PROGRESS NOTES
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  She had left sided erosion remotely and lead extracted  Paces up to 76 % since they increased bystolic  EF  normal    4  Cath for borderline stress test 50% RCA and nonobstructive- she had Imdur started, bystolic doubled to 5mg bid and rosumvastatin added  5  HTN reasonable control  6 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    7  CKD CR 1 0 on last BMP  8  Mild lupillo edema, maybe from pulm htn and verpamil, has been worse on left sided where hemiparetic  9  SOB workup last year, cath was noobstructive, echo nl EF and mild pulm edema  DISCUSSION AND PLAN:    1  Resume lasix 20mg daily and potassium 20meq daily  2  Cont pradaxa bystolic, verpamil  F/u 6 months      Orders Placed This Encounter   Procedures   • POCT ECG     There are no discontinued medications  Follow up in: 6 months        HPI:   79 yo female  1  Permanent afib rates controlled on Pradaxa 150mg bid [id number]  2  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- Xarelto failure  Xarelto had been dosed appropriately at 15mg for reduced CrCl<51    3  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  She had left sided erosion remotely and lead extracted   Paces up to 76 % since they increased bystolic  EF  normal    4  Cath for borderline stress test 50% RCA and nonobstructive- she had Imdur started, bystolic doubled to 5mg bid and rosumvastatin added  5  HTN reasonable control  6 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    7  CKD CR 1 0 on last BMP  8  Mild lupillo edema, maybe from pulm htn and verpamil, has been worse on left sided where hemiparetic  9  SOB workup last year, cath was noobstructive, echo nl EF and mild pulm edema  Maribel Rojas lives in [address]  NO CP/SOB  She hs left sided edema  She ambulates with assistance  Has caregivers  Please note HPI is listed by problem with with update following it, it is copied again in the assessment above and reflects medical decision making as well  Complete 12 point ROS reviewed and otherwise non pertinent or negative except as per HPI  Please see paper chart for outpatient clinic patients where the patient completed the 12 point ROS survey  Past Medical History:   Diagnosis Date   • A-fib ([facility] [address] )    • Anemia    • Arthritis    • Breast pain, right    • Chest pain on breathing    • Colon polyp    • Cough    • Diverticulosis    • Encounter for screening colonoscopy    • H/O sick sinus syndrome    • Heart murmur    • High cholesterol    • History of atrial fibrillation     Rate ontrolled  On xarelto 15mg (creatinine 50) Followed by Dr Olga Bright with Cardiology    • History of weight loss     Report loose fitting clothes  Weight stable (3lbs difference)  Last colo showed small tubular adenoma x2  Breast biopsy last showed fibrocystic changes  TSH wnl  Will check cbc, bmp, spep  • Hx of long term use of blood thinners    • Hypertension    • Irregular heart beat    • Pacemaker    • Preop examination    • Shortness of breath    • Viral bronchitis        Allergies   Allergen Reactions   • Other Itching     Bandaids   • Tape [Medical Tape] Itching     I reviewed the Home Medication list and Allergies in the chart  Scheduled Meds:  Current Outpatient Medications   Medication Sig Dispense Refill   • dabigatran etexilate (Pradaxa) 150 mg capsu Take 1 capsule (150 mg total) by mouth 2 (two) times a day 60 capsule 2   • losartan (COZAAR) 50 mg tablet Take 1 tablet (50 mg total) by mouth daily 30 tablet 2   • nebivolol (BYSTOLIC) 5 mg tablet Take 1 tablet (5 mg total) by mouth daily 90 tablet 3   • pantoprazole (PROTONIX) 40 mg tablet Take 1 tablet (40 mg total) by mouth daily in the early morning 30 tablet 2   • potassium chloride (K-DUR,KLOR-CON) 10 mEq tablet Take 10 mEq by mouth daily     • rosuvastatin (CRESTOR) 10 MG tablet Take 0 5 tablets (5 mg total) by mouth daily 15 tablet 2   • tamsulosin (FLOMAX) 0 4 mg Take 1 capsule (0 4 mg total) by mouth daily with dinner 30 capsule 2   • verapamil (CALAN-SR) 240 mg CR tablet Take 1 tablet (240 mg total) by mouth daily at bedtime 90 tablet 3   • acetaminophen (TYLENOL) 325 mg tablet Take 2 tablets (650 mg total) by mouth every 6 (six) hours as needed for mild pain (Patient not taking: No sig reported)  0   • benzonatate (TESSALON PERLES) 100 mg capsule Take 2 capsules (200 mg total) by mouth 3 (three) times a day as needed for cough (Patient not taking: No sig reported)     • bisacodyl (DULCOLAX) 10 mg suppository Insert 1 suppository (10 mg total) into the rectum daily as needed (Constipation   Second line and refer to bowel protocol) (Patient not taking: No sig reported) 12 suppository 0   • Blood Pressure Monitoring (Blood Pressure Cuff) MISC Use every other day For HTN (Patient not taking: No sig reported) 1 each 0   • dabigatran etexilate (PRADAXA) 150 mg capsu Take 1 capsule (150 mg total) by mouth 2 (two) times a day PRICE CHECK DO NOT FILL (Patient not taking: Reported on 10/5/2022) 60 capsule 0   • guaiFENesin (MUCINEX) 600 mg 12 hr tablet Take 1 tablet (600 mg total) by mouth every 12 (twelve) hours (Patient not taking: No sig reported)  0   • Incontinence Supply Disposable (RA Adult Wipes) MISC Use 4 (four) times a day 64 each 10   • magnesium oxide (MAG-OX) 400 mg TAKE 1 TABLET (400 MG TOTAL) BY MOUTH DAILY (Patient not taking: No sig reported) 30 tablet 2   • melatonin 3 mg Take 2 tablets (6 mg total) by mouth every evening (Patient not taking: No sig reported)  0   • menthol-methyl salicylate (BENGAY) 88-61 % cream Apply topically 2 (two) times a day (Patient not taking: No sig reported)  0   • nitroglycerin (NITROSTAT) 0 4 mg SL tablet Place 1 tablet (0 4 mg total) under the tongue every 5 (five) minutes as needed for chest pain (Patient not taking: No sig reported)  0   • phenol (CHLORASEPTIC) 1 4 % mucosal liquid Apply 1 spray to the mouth or throat every 2 (two) hours as needed (cough) (Patient not taking: No sig reported)  0   • polyethylene glycol (MIRALAX) 17 g packet Take 17 g by mouth daily as needed (First line and refer to bowel protocol) (Patient not taking: No sig reported)  0   • polyvinyl alcohol (LIQUIFILM TEARS) 1 4 % ophthalmic solution Administer 1 drop to the right eye as needed for dry eyes (Patient not taking: No sig reported) 15 mL 0   • simethicone (MYLICON,GAS-X) 721 MG CAPS Take 1 capsule (125 mg total) by mouth 3 (three) times a day before meals 28 capsule 0     Current Facility-Administered Medications   Medication Dose Route Frequency Provider Last Rate Last Admin   • [START ON 11/5/2022] cyanocobalamin injection 1,000 mcg  1,000 mcg Intramuscular Q30 Days Lia Zacarias MD         PRN Meds:         Family History   Problem Relation Age of Onset   • Diabetes Mother    • Breast cancer Sister 48   • No Known Problems Father    • No Known Problems Maternal Grandmother    • No Known Problems Maternal Grandfather    • No Known Problems Paternal Grandmother    • No Known Problems Paternal Grandfather    • No Known Problems Daughter    • No Known Problems Son    • No Known Problems Sister    • No Known Problems Sister    • No Known Problems Brother • No Known Problems Brother    • No Known Problems Sister    • No Known Problems Paternal Aunt    • No Known Problems Paternal Aunt    • No Known Problems Paternal Aunt    • No Known Problems Paternal Aunt        Social History     Socioeconomic History   • Marital status:      Spouse name: Not on file   • Number of children: Not on file   • Years of education: Not on file   • Highest education level: Not on file   Occupational History   • Occupation: retired   Tobacco Use   • Smoking status: Never Smoker   • Smokeless tobacco: Never Used   Vaping Use   • Vaping Use: Never used   Substance and Sexual Activity   • Alcohol use: Never   • Drug use: No   • Sexual activity: Not Currently     Comment:    Other Topics Concern   • Not on file   Social History Narrative    Housing, household, and economic circumstances      Social Determinants of Health     Financial Resource Strain: Low Risk    • Difficulty of Paying Living Expenses: Not hard at all   Food Insecurity: No Food Insecurity   • Worried About Running Out of Food in the Last Year: Never true   • Ran Out of Food in the Last Year: Never true   Transportation Needs: No Transportation Needs   • Lack of Transportation (Medical): No   • Lack of Transportation (Non-Medical):  No   Physical Activity: Not on file   Stress: Not on file   Social Connections: Not on file   Intimate Partner Violence: Not on file   Housing Stability: Low Risk    • Unable to Pay for Housing in the Last Year: No   • Number of Places Lived in the Last Year: 1   • Unstable Housing in the Last Year: No         OBJECTIVE:    /72 (BP Location: Left arm, Patient Position: Sitting, Cuff Size: Adult)   Pulse 77   Ht 5' 3" (1 6 m)   Wt 61 7 kg (136 lb)   BMI 24 09 kg/m²   Vitals:    10/28/22 1040   Weight: 61 7 kg (136 lb)     /72 (BP Location: Left arm, Patient Position: Sitting, Cuff Size: Adult)   Pulse 77   Ht 5' 3" (1 6 m)   Wt 61 7 kg (136 lb)   BMI 24 09 kg/m² Vitals:    10/28/22 1040   Weight: 61 7 kg (136 lb)     /72 (BP Location: Left arm, Patient Position: Sitting, Cuff Size: Adult)   Pulse 77   Ht 5' 3" (1 6 m)   Wt 61 7 kg (136 lb)   BMI 24 09 kg/m²   Vitals:    10/28/22 1040   Weight: 61 7 kg (136 lb)     /72 (BP Location: Left arm, Patient Position: Sitting, Cuff Size: Adult)   Pulse 77   Ht 5' 3" (1 6 m)   Wt 61 7 kg (136 lb)   BMI 24 09 kg/m²   Vitals:    10/28/22 1040   Weight: 61 7 kg (136 lb)     GEN: No acute distress, Alert and oriented, well appearing  HEENT:External ears normal, EYES: Pupils equal, sclera anicteric, midline, normal conjuctiva  NECK: No JVD, supple, no obvious masses or thryomegaly or goiter  CARDIOVASCULAR:  RRR, No murmur, rub, gallops S1,S2  LUNGS: Clear To auscultation bilaterally, normal effort, no rales, rhonchi, crackles   ABDOMEN:  nondistended,  without obvious organomegaly or ascites  EXTREMITIES/VASCULAR: 1+ pitting on left leg  PSYCH: Normal Affect,  linear speech pattern without evidence of psychosis  NEURO: left arm and left leg weakness but able to move  GAIT:  In wheelchair  HEME: No bleeding, bruising, petechia, purpura   SKIN: No significant rashes on visibile skin, warm, no diaphoresis or pallor               Lab Results:     @RESULTRCNT(1M])@    CBC with diff:     CMP:      Invalid input(s): ALBUMIN  TSH:       Lipid Profile:             I reviewed and interpreted the following LABS/EKG/TELE/IMAGING and below is summary of my interpretation (if data available):    EKG Afib w V pacing 77bpm

## 2022-10-28 NOTE — PROGRESS NOTES
Results for orders placed or performed in visit on 10/28/22   Cardiac EP device report    Narrative    MDT-SINGLE CHAMBER PPM / NOT MRI CONDITIONAL  DEVICE INTERROGATED IN THE [place] OFFICE- NO TEST  BATTERY VOLTAGE ADEQUATE (9 4 YRS)  -69% (>40% [email or web address])  ALL LEAD PARAMETERS WITHIN NORMAL LIMITS  NO SIGNIFICANT HIGH RATE EPISODES  PT TO SEE DR WATTS TODAY  NORMAL DEVICE FUNCTION   GV

## 2022-11-01 ENCOUNTER — PATIENT OUTREACH (OUTPATIENT)
Dept: INTERNAL MEDICINE CLINIC | Facility: CLINIC | Age: 82
End: 2022-11-01

## 2022-11-01 NOTE — PROGRESS NOTES
SW has met with pt at in the office this date to assist her with completing her PA Waiver Appeal to hopefully get  additional hours approved  Also present was her PA Waiver aide Saravanan Carias  Sw spoke via  ID # [id number]  Pt initially indicated she was having trouble with insurance coverage but it appears she was explaining that her SNF coverage ended at rehab and she had to go home  She described that her time in SNF rehab "was a very bad time in her life"  Pt is happy to be home but does feel she needs 24 hour care for her safety  Pt was initially approved for 30 hours of aide care and this was increased to 45 hours per week  However, since her recent CVA they are requesting 123 hours/ week plus the 45 hours already approved ( Which will equal 168 hours/ week or 24 hours per day)  SW did review the paperworjk the pt brought with her and MIS assisted pt with completing same and it was faxed to  Layton Ramires at [phone] and confirmation received  Forms scanned to the chart  Pt has her [facility] meeting scheduled for Friday 11/4/22 at 11:30 AM      SW attempted to reach pt's son this date but the line was busy  Mis also attempted to reach pt's  but SW unable to leave a message  Pt to f/u with SW re out come of meeting

## 2022-11-02 ENCOUNTER — APPOINTMENT (OUTPATIENT)
Dept: PHYSICAL THERAPY | Facility: CLINIC | Age: 82
End: 2022-11-02

## 2022-11-02 ENCOUNTER — APPOINTMENT (OUTPATIENT)
Dept: OCCUPATIONAL THERAPY | Facility: CLINIC | Age: 82
End: 2022-11-02

## 2022-11-02 NOTE — PROGRESS NOTES
Occupational Therapy Daily Note:    Today's date: 2022  Patient name: Kathy Bright  : 1940  MRN: [id number]  Referring provider: Emiliano Christian PA-C  Dx:   Encounter Diagnosis   Name Primary? • History of CVA (cerebrovascular accident) Yes       Subjective: ***    Objective: Pt engaged in skilled OT treatment session with focus on UE NMR, UE strengthening, UE endurance, FMC/GMC and FMS/GMS to increase engagement, endurance, tolerance, and independence with daily ADL and IADL tasks  CPT Code Minutes                                           Task Details        Therapeutic Activity               Neuro Re-Ed               Therapeutic Exercise  Pt engaged in therex utilizing *** tbar in *** focusing on proximal strength/endurance to improved quality of movement to increase AROM/joint flexibility needed for fxl reach activities to increase indep engaging in home mngt activities  Pt completed FF w/5 sec place and hold *** range shoulder flex, ***, fwd/bwd rows, 3x10 achieving near full range w/o report of pain  Manual          Self Care           Assessment: Tolerated treatment well  Patient would benefit from continued skilled OT      Plan: Continued skilled OT per POC    INTERVENTION COMMENTS:  Diagnosis: History of CVA (cerebrovascular accident) [Z86 73]  Precautions: Fall risk, pacemaker, No E-STIM   FOTO: Completed  Insurance: Payor: [facility] Vidhya Zuñiga REP / Plan: Annelise Contras / Product Type: Medicare HMO /   6 of 16 visits, PN due 11/10/2022   POC ends 2023

## 2022-11-03 ENCOUNTER — OFFICE VISIT (OUTPATIENT)
Dept: OCCUPATIONAL THERAPY | Facility: CLINIC | Age: 82
End: 2022-11-03

## 2022-11-03 ENCOUNTER — APPOINTMENT (OUTPATIENT)
Dept: PHYSICAL THERAPY | Facility: CLINIC | Age: 82
End: 2022-11-03

## 2022-11-03 ENCOUNTER — OFFICE VISIT (OUTPATIENT)
Dept: PHYSICAL THERAPY | Facility: CLINIC | Age: 82
End: 2022-11-03

## 2022-11-03 DIAGNOSIS — Z86.73 HISTORY OF CVA (CEREBROVASCULAR ACCIDENT): Primary | ICD-10-CM

## 2022-11-03 DIAGNOSIS — Z74.09 IMPAIRED FUNCTIONAL MOBILITY, BALANCE, GAIT, AND ENDURANCE: ICD-10-CM

## 2022-11-03 NOTE — PROGRESS NOTES
Daily Note     Today's date: 11/3/2022  Patient name: Catalino Oreilly  : 1940  MRN: [id number]  Referring provider: Yumiko Potter PA-C  Dx:   Encounter Diagnosis     ICD-10-CM    1  History of CVA (cerebrovascular accident)  Z86 73    2  Impaired functional mobility, balance, gait, and endurance  Z74 09                   Subjective: Patient states she did not take the wheelchair to her son's house and was able to do the stairs there  She states that her cardiologist started her on a pill (lasix)  She states that both of her legs are swollen  She reports she does not have any pain in her legs  Objective: See treatment diary below      Assessment: Tolerated treatment well  Swelling noted bilaterally at lower legs, no redness tenderness or heat noted  SOLO and SOLO TM not available during session, performed gait training in hallway  Continues to require min vc's/ hand over hand for cane sequencing, but improving sequencing and gait speed noted  Continues to have difficulty with hurdles, especially with L leg in trailing positioning  Continue to progress as tolerated  Patient would continue to benefit from skilled PT interventions to address deficits with balance, gait, and endurance  Patient demonstrated fatigue post treatment      Plan: Continue per plan of care        Precautions: HTN, a-fib, pacemaker      Manuals 10/13 10/19 10/21 10/24 1026 11/3                                            Neuro Re-Ed          Standing marching          sidesteps // bars (short) x 4 laps // bars (short) x 4 laps 2# AW on L // bars (short) x 4 laps with 2# AW on L LE  //bars (short)  2# L AW  X 4 laps // bars (short) 3# AW on L LE, x 4 laps    HKM // bars (short) x 3 laps // bars (sbort) x 4 laps with 2# AW on L // bars (short) x 4 laps with 2# AW on L   // bars (short) x 3 laps with 3# AW on L    Sit to stand with LE elevated R LE on 4" step, x 10 reps, therapist assist in front  R LE on 4" step, x 15 reps, therapist in front  R LE on 4" step x 15 reps, hold at top without UE support, eccentric lower with therapist in front with CGA Cues throughout session with tactile cues for symmetry with LE placement Cues throughout session with tactile cues for symmetry with LE placement  X 10 reps feet even with UE's, x 10 with R LE in front  Step taps BUE support on 6-inch step x 20 reps each L UE support on 6-inch step x 10 reps each with 2# AW on L LE        backwards  // bars (short) x 3 laps with 2# AW on L LE // bars (short) x 3 laps with 2# AW on L LE       Step over jim   Forward/back with 6-inch jim with L LE with 2# AW x 10 reps, min A to clear stepping back  //bars (short)  Low hurdles (4) 2# L AW  X 2 laps    Low jim (1)  LLE w/2# AW x 10 rep Fwd/Bwd // bars with 6-inch hurdles (4) with 3# AW on L LE, x 3 laps forward    Step up and over    8-inch step, leading with R ascending, L descending x 10 reps    6-inch step leading with L LE and back with R LE x 10 reps //bars (short)  2# L AW  6"step L ascending, R descending x 10 reps                         Ther Ex          Sit to stand With FWW in front x 10 reps, tactile cues at L LE for quad activation         nustep          treadmill                                                                Ther Activity          Stand pivot transfer                    Gait Training          Gait with SPC    SOLO  With SPC on R side, 1/4 lap x 4 laps  CGA and mod vc's for correct sequencing SOLO  With SPC on R side, 1/4 lap x 4 laps   And mod v c  for correct sequencing Hallway with SPC on R side, 100 feet with hand over hand and CGA, 2nd trial of 200 feet    3# AW on L LE    With FWW in hallway 1 trial of 300 feet with CGA    1 trial of 200 feet with HHA CGA-min A 1 trial of 400 feet with FWW and CGA    1 trial of 400 feet with rollator and CGA 1 trial of 500 feet with FWW and CGS/CS 1 trial of 500 feet with FWW and CGA/CS  1 trial of 500 feet with FWW and SPV, fast walking    Gait training on TM    SOLO TM  1  2mph for 4 minutes with BUE support, cues for increased step length on L LE    HR 91, SpO2 99% post    2nd trial 1  2mph for 4 minutes SOLO TM   B/L UE  1 2 mph x 5 min good correction w/picking up B/L LE when scraping tread  1 2 mph x 5 min     stairs Up and over staircase with bilat handrails, x 3 laps  Step over step pattern on 4-inch, step to pattern on 6-inch Up and over staircase with bilat handrails x 5 laps  Step over step pattern on each direction, cues for foot positioning  2# AW on L LE        Modalities              Education for increased step count, increasing intensity of exercise for neuroplasticity, discussed using FWW to come to therapy vs manual w/c                            Access Code: [email or web address]  URL: [email or web address]  Date: 10/13/2022  Prepared by: Raleigh Humphrey    Exercises  · Seated Hamstring Stretch with Strap - 1 x daily - 7 x weekly - 3 sets - 30 hold  · Seated Gastroc Stretch with Strap - 1 x daily - 7 x weekly - 3 sets - 30 hold  · Seated Long Arc Quad - 1 x daily - 5 x weekly - 3 sets - 10 reps  · Seated March - 1 x daily - 5 x weekly - 3 sets - 10 reps  · Seated Heel Raise - 1 x daily - 5 x weekly - 3 sets - 10 reps  · Seated Hip Adduction Isometrics with Ball - 1 x daily - 5 x weekly - 3 sets - 10 reps - 5 hold  · Seated Hip Abduction - 1 x daily - 5 x weekly - 3 sets - 10 reps

## 2022-11-03 NOTE — PROGRESS NOTES
Occupational Therapy Daily Note:    Today's date: 11/3/2022  Patient name: Jemal Stephen  : 1940  MRN: [id number]  Referring provider: Rajesh Velazquez PA-C  Dx:   Encounter Diagnosis   Name Primary? • History of CVA (cerebrovascular accident) Yes         Time:  -1135 Group  113-12 unsupervised    Subjective: "It's too hard "    Objective: Pt engaged in skilled OT treatment session with focus on UE strengthening, UE endurance, FMC/GMC, FMS/GMS and body awareness to increase engagement, endurance, tolerance, and independence with daily ADL and IADL tasks  CPT Code Minutes                                           Task Details        Therapeutic Activity 50 Pt completed seated fine motor control/coordination, UE endurance/strength, funtioncal reaching, intrinsic strength & coordination task  Pt attempted task to retrieve mushroom pegs with red resistive clip and place to large pegboard positioned on slant board following photographed template  Pt demo >75% droppage in task, task downgraded to task complete w/o tool, no droppage noted  Pt donned 2# weight cuff to distal UE to inc GMC/GMS demand  Neuro Re-Ed               Therapeutic Exercise 10 Pt tolerated 5 min x 2 prograde/retrograde motions at 1 0 resist sitting on UBE with focus on increasing proximal UE strength, endurance, act tolerance, cardiopulmonary endurance, grasp on handles and ROM to inc indep and safety with ADL/IADL fxn and fxnl reaching tasks  Manual          Self Care           Assessment: Tolerated treatment well  Pt demo good endurance/strength in UEB with no rest breaks needed during tasks  Pt demo difficulty with functional tool use with reaching demand, when task downgraded to completing w/o tool, pt demo improved success  Pt would benefit from increased fine motor tasks during OT sessions due to decreased FM control and weakness   Patient would benefit from continued skilled OT     Plan: Continued skilled OT per POC    INTERVENTION COMMENTS:  Diagnosis: History of CVA (cerebrovascular accident) [Z86 73]  Precautions: Fall risk, pacemaker, No E-STIM   FOTO: Completed  Insurance: Payor: [facility] Vidhya Zuñiga REP / Plan: Crescencio Radha / Product Type: Medicare HMO /   6 of 16 visits, PN due 11/10/2022   POC ends 01/02/2023

## 2022-11-04 ENCOUNTER — APPOINTMENT (OUTPATIENT)
Dept: PHYSICAL THERAPY | Facility: CLINIC | Age: 82
End: 2022-11-04

## 2022-11-05 PROBLEM — R05.9 COUGH: Status: RESOLVED | Noted: 2022-09-03 | Resolved: 2022-11-05

## 2022-11-07 ENCOUNTER — EVALUATION (OUTPATIENT)
Dept: PHYSICAL THERAPY | Facility: CLINIC | Age: 82
End: 2022-11-07

## 2022-11-07 ENCOUNTER — CLINICAL SUPPORT (OUTPATIENT)
Dept: INTERNAL MEDICINE CLINIC | Facility: CLINIC | Age: 82
End: 2022-11-07

## 2022-11-07 DIAGNOSIS — Z74.09 IMPAIRED FUNCTIONAL MOBILITY, BALANCE, GAIT, AND ENDURANCE: ICD-10-CM

## 2022-11-07 DIAGNOSIS — Z86.73 HISTORY OF CVA (CEREBROVASCULAR ACCIDENT): Primary | ICD-10-CM

## 2022-11-07 DIAGNOSIS — E53.8 B12 DEFICIENCY: Chronic | ICD-10-CM

## 2022-11-07 RX ADMIN — CYANOCOBALAMIN 1000 MCG: 1000 INJECTION, SOLUTION INTRAMUSCULAR; SUBCUTANEOUS at 10:06

## 2022-11-07 NOTE — PROGRESS NOTES
Patient seen here today on nurse schedule for b12 injection  Administered 1 mL in patient left deltoid  Patient tolerated well  Patient notified to return in 30 days for b12 injection

## 2022-11-07 NOTE — PROGRESS NOTES
Progress Note        Today's date: 2022  Patient name: Shazia Michael  : 1940  MRN: [id number]  Referring provider: Chastity Paul PA-C  Dx:   Encounter Diagnosis     ICD-10-CM    1  History of CVA (cerebrovascular accident)  Z86 73    2  Impaired functional mobility, balance, gait, and endurance  Z74 09                    Assessment  22: Reggie Brock has attended 8 sessions of physical therapy since initial evaluation  She is making good progress with therapy, and at this time has met 4/5 short term goals  She is able to perform sit to stand and transfers with SPV, and is walking for longer distances at home with FWW and rarely using the wheelchair (only when out of the house)  She is demonstrating good progress with all outcome measures at this time, gait speed improved from 0 30m/s to 0 46m/s, TUG improved from 54 seconds to 28 seconds, 6MWT improved from 130 feet (unable to complete 6MWT) to 454 feet in 6 minutes  Testing did not show an improvement with 5xSTS during testing, however patient was able to progress from requiring min A for sit to stand to completing with SPV in similar times  Reggie Brock continues to demonstrate significant improvements in mobility, endurance, strength, functional tasks, decreased assistance with tasks within the home  She would benefit from continued skilled physical therapy to decrease fall risk, improve balance, improve functional mobility, decrease caregiver burden, maximize function, and improve endurance  Continue per current POC  Assessment details: Patient presents to outpatient physical therapy s/p CVA on 22, post inpatient rehabilitation and SNF stay  She presents with impaired L LE strength,  Increased L LE tone and spasticity, decreased endurance, decreased balance, increased fall risk, and requiring assistance for mobility tasks (gait, transfers, bed mobility)   She presents with increased fall risk from increased time to complete 5xSTS of 27 6 seconds, above cut-off score of 15 seconds  She also presents with decreased gait speed of 0 30 m/s, below cut-off score of 1 0m/s for increased risk of falls, and also is below cut-off score for needing assist with ADL's and IADLS (5 6A/T cut-off score), is more likely to be hospitalized (0 6m/s cut-off), and is considered a household walker  She is also considered a fall risk with TUG score of 54 4 seconds, above cut-off score of 13 5 seconds for fall risk  She demonstrates decreased LE strength and endurance from 5xSTS of 27 65 seconds (with min A due to retropulsion)  She also demonstrates decreased functional mobility and endurance with 2MWT of 110 feet, and able to ambulate 130 feet in total before requiring rest break  She would benefit from skilled physical therapy to decrease fall risk, improve balance, improve LE strength and endurance, improve cardiovascular endurance, improve functional mobility, decreased caregiver burden and maximize function  Impairments: abnormal gait, abnormal muscle tone, abnormal or restricted ROM, activity intolerance, impaired balance, impaired physical strength, pain with function and safety issue  Understanding of Dx/Px/POC: good   Prognosis: good    Goals  ST  Pt will improve gait speed to at least 0 40 m/s with least restrictive device within 4 weeks needed to improve safety with ambulation  MET  2  Pt will improve TUG time by at least 5 seconds to decrease fall risk and improve mobility in 4 weeks MET  3  Pt will improve 5xSTS score by at least 5 seconds within 4 weeks needed to reduce fall risk  Not met (able to do without assist)  4  Pt will improve 6MWT to at least 250 feet in 4 week to improve functional mobility and endurance in 4 weeks MET  5  Pt will improve bed mobility and transfers to [facility] to min A at most in 4 weeks to improve functional mobility and decrease caregiver burden in 4 weeks  MET     NEW STG (completed by 8 weeks, 2022)  1   Pt will improve gait speed to at least 0 6m/s with least restrictive device within 8 weeks needed to improve safety with ambulation  2  Pt will improve TUG score to <23 seconds in 8 weeks to decrease fall risk and improve functional mobility  3  Pt will improve 5xSTS to at least 20 seconds with SPV within 8 weeks to decrease fall risk  4  Pt will improve 6MWT to at least 600 feet with LRAD in 8 weeks to improve functional mobility and endurance    LT  Pt will improve gait speed to at least 0 70 m/s with least restrictive device within 12 weeks needed to improve safety with ambulation  2  Pt will improve TUG score to <20 seconds in 12 weeks to decrease fall risk and improve functional mobility  3  Pt will improve 5xSTS score to <15 sec with SPV within 12 weeks needed to reduce fall risk  4  Pt will improve 6MWT to at least 700 feet in 12 weeks to improve functional moblity REVISED  5  Pt will demonstrate independence with HEP within 12 weeks  Plan  Plan details: 2-3x/week per patient tolerance  Patient would benefit from: skilled physical therapy  Planned therapy interventions: balance, balance/weight bearing training, neuromuscular re-education, motor coordination training, orthotic fitting/training, orthotic management and training, patient education, coordination, strengthening, stretching, flexibility, therapeutic activities, therapeutic training, therapeutic exercise, gait training, transfer training and home exercise program  Duration in weeks: 12  Plan of Care beginning date: 10/10/2022  Plan of Care expiration date: 2023  Treatment plan discussed with: patient        Subjective Evaluation    History of Present Illness  22: She states that she is making good improvements  She states that she has difficulty with getting out and getting into bed  She states she has a difficult time getting up from a chair  She reports that she needs help getting in and out of bed   She reports that she needs someone with her (home attendant or her son), especially to get up at night  Mechanism of injury: She usually lives alone, now her son is off from work to help her  She states she needs help to move from the bed  She states that she is walking with a walker at home  She states she sometimes uses the wheelchair in the house, but mainly when leaving the house  She has been staying at her home, but went to her son's house yesterday (bathroom 2nd floor)  She states she has difficulty with going down the stairs  She states she has pain in the knee down and the foot feels heavy and asleep on the L side  She states she wants to walk better with the walker  22 with contracted and weak LUE, LLE weakness, L facial droop, mild dysarthria and left sided sensory deficits  CTA showed near occlusive thormbus at R MCA bifurcation, as well as mild beading of the mid to distal ICAs suspicious for mild fibromuscular dysplasia, and mild atherosclerotic disease at bilateral distal carotid arteries  [facility] discharge on 22, discharged to [facility] discharged last week         Pain  Current pain ratin  At best pain ratin  At worst pain ratin  Location: L LE  Quality: cramping  Relieving factors: rest    Social Support  Steps to enter house: no  Stairs in house: no   Lives in: apartment  Lives with: alone    Treatments  Discharged from (in last 30 days): skilled nursing facility  Patient Goals  Patient goal: walk with the walker and get better        Objective       Manual Muscle Testing - Hip Left Right   Flexion 2+ 4+   Extension NT NT   Abduction NT NT     Manual Muscle Testing - Knee Left Right   Flexion 3+ 4+   Extension 3+ 4+     Manual Muscle Testing - Ankle Left Right   Doriflexion 2- 4+   Plantarflexion NT NT         Coordination Left Right   Alt toe tapping unable Not tested       Sensation Left Right   Kinesthesia NT NT   Light Touch intact intact       Muscle Tone (Modified Sada Scale**) Left Right Hamstring 2 0   Gastroc 3 0   Quad 2 0   **  0 = no increase in muscle tone  1 = slight increase in muscle tone, minimal resistance at the end of ROM when moved  1+ = minimal resistance throughout the remainder (less than half) of ROM when moved  2 =  David increase in muscle tone through most of the ROM, but still moved easily  3 = considerable increase in muscle tone, movement difficult  4 = affects parts rigid       Balance Test Initial Eval 22     5x Sit to Stand: 27 6 with UE's on FWW, min A 31 22 seconds with UE's, SPV     TU 4 seconds with FWW 28 3 seconds with FWW and SPV     Gait Speed:  0 30m/s with FWW and CGA 0 46 m/s with FWW and SPV     2 Minute Walk Test: 110 feet with FWW and  feet with FWW and SPV     6 Minute Walk Test: 130 feet in 2min 39 seconds 454 feet with FWW and SPV, completed full 6 minutes     Shin Balance Scale: NT      FGA:  NT            Transfers  22   Sit To Stand Min Assist SPV   W/C To Bed Mod Assist With FWW, SPV   Sit To Supine Mod Assist Min A at [facility] 6 A         Gait Assessment: Decreased gait speed, decreased step length L>R, decreased heel strike bilaterally       Precautions: HTN, a-fib, pacemaker      Manuals 10/19 10/21 10/24 1026 11/3 11/7                                       Neuro Re-Ed         Standing marching         sidesteps // bars (short) x 4 laps 2# AW on L // bars (short) x 4 laps with 2# AW on L LE  //bars (short)  2# L AW  X 4 laps // bars (short) 3# AW on L LE, x 4 laps    HKM // bars (sbort) x 4 laps with 2# AW on L // bars (short) x 4 laps with 2# AW on L   // bars (short) x 3 laps with 3# AW on L    Sit to stand with LE elevated R LE on 4" step, x 15 reps, therapist in front  R LE on 4" step x 15 reps, hold at top without UE support, eccentric lower with therapist in front with CGA Cues throughout session with tactile cues for symmetry with LE placement Cues throughout session with tactile cues for symmetry with LE placement  X 10 reps feet even with UE's, x 10 with R LE in front  STS x 10 reps, cues for foot positioning and hand positioning   Step taps L UE support on 6-inch step x 10 reps each with 2# AW on L LE        backwards // bars (short) x 3 laps with 2# AW on L LE // bars (short) x 3 laps with 2# AW on L LE       Step over jim  Forward/back with 6-inch jim with L LE with 2# AW x 10 reps, min A to clear stepping back  //bars (short)  Low hurdles (4) 2# L AW  X 2 laps    Low jim (1)  LLE w/2# AW x 10 rep Fwd/Bwd // bars with 6-inch hurdles (4) with 3# AW on L LE, x 3 laps forward    Step up and over   8-inch step, leading with R ascending, L descending x 10 reps    6-inch step leading with L LE and back with R LE x 10 reps //bars (short)  2# L AW  6"step L ascending, R descending x 10 reps                       Ther Ex         Sit to stand         nustep         treadmill                                                          Ther Activity         Stand pivot transfer         Bed mobility      Transfer from wheelchair to/from mat with FWW and CS, sit to supine min A at L LE, rolling with min A, supine to sit with min-mod A  Rolling R/L x 10 each with push through LE, supine to sit x 10 reps, supine with push up through L UE x 10 reps    Bridging x 10 reps    Scooting x 5 reps each side    Supine to sit x 5 reps   Gait Training         Gait with SPC   SOLO  With SPC on R side, 1/4 lap x 4 laps  CGA and mod vc's for correct sequencing SOLO  With SPC on R side, 1/4 lap x 4 laps   And mod v c  for correct sequencing Hallway with SPC on R side, 100 feet with hand over hand and CGA, 2nd trial of 200 feet    3# AW on L LE    With FWW in hallway 1 trial of 400 feet with FWW and CGA    1 trial of 400 feet with rollator and CGA 1 trial of 500 feet with FWW and CGS/CS 1 trial of 500 feet with FWW and CGA/CS  1 trial of 500 feet with FWW and SPV, fast walking 6MWT 454 feet completed with FWW    TUG with FWW with CS    Gait speed   Gait training on TM   SOLO TM  1  2mph for 4 minutes with BUE support, cues for increased step length on L LE    HR 91, SpO2 99% post    2nd trial 1  2mph for 4 minutes SOLO TM   B/L UE  1 2 mph x 5 min good correction w/picking up B/L LE when scraping tread  1 2 mph x 5 min  SOLO  TM B/L UE 1 4mph x 5 minutes  Seated rest break  1 6 mph for 2 minutes   stairs Up and over staircase with bilat handrails x 5 laps  Step over step pattern on each direction, cues for foot positioning  2# AW on L LE        Modalities            Education for increased step count, increasing intensity of exercise for neuroplasticity, discussed using FWW to come to therapy vs manual w/c   Progress with therapy, bed mobility education, outcome measures and progress                       Access Code: [email or web address]  URL: [email or web address]  Date: 10/13/2022  Prepared by: Elisha Legacy    Exercises  · Seated Hamstring Stretch with Strap - 1 x daily - 7 x weekly - 3 sets - 30 hold  · Seated Gastroc Stretch with Strap - 1 x daily - 7 x weekly - 3 sets - 30 hold  · Seated Long Arc Quad - 1 x daily - 5 x weekly - 3 sets - 10 reps  · Seated March - 1 x daily - 5 x weekly - 3 sets - 10 reps  · Seated Heel Raise - 1 x daily - 5 x weekly - 3 sets - 10 reps  · Seated Hip Adduction Isometrics with Ball - 1 x daily - 5 x weekly - 3 sets - 10 reps - 5 hold  · Seated Hip Abduction - 1 x daily - 5 x weekly - 3 sets - 10 reps

## 2022-11-09 ENCOUNTER — OFFICE VISIT (OUTPATIENT)
Dept: PHYSICAL THERAPY | Facility: CLINIC | Age: 82
End: 2022-11-09

## 2022-11-09 ENCOUNTER — OFFICE VISIT (OUTPATIENT)
Dept: OCCUPATIONAL THERAPY | Facility: CLINIC | Age: 82
End: 2022-11-09

## 2022-11-09 ENCOUNTER — PATIENT OUTREACH (OUTPATIENT)
Dept: INTERNAL MEDICINE CLINIC | Facility: CLINIC | Age: 82
End: 2022-11-09

## 2022-11-09 DIAGNOSIS — Z74.09 IMPAIRED FUNCTIONAL MOBILITY, BALANCE, GAIT, AND ENDURANCE: ICD-10-CM

## 2022-11-09 DIAGNOSIS — Z86.73 HISTORY OF CVA (CEREBROVASCULAR ACCIDENT): Primary | ICD-10-CM

## 2022-11-09 DIAGNOSIS — Z74.1 NEED FOR ASSISTANCE WITH PERSONAL CARE: ICD-10-CM

## 2022-11-09 PROBLEM — Z00.00 HEALTHCARE MAINTENANCE: Status: RESOLVED | Noted: 2022-08-20 | Resolved: 2022-11-09

## 2022-11-09 NOTE — PROGRESS NOTES
Daily Note     Today's date: 2022  Patient name: Jacek Finley  : 1940  MRN: [id number]  Referring provider: Angel Alvarez PA-C  Dx:   Encounter Diagnosis     ICD-10-CM    1  History of CVA (cerebrovascular accident)  Z86 73    2  Impaired functional mobility, balance, gait, and endurance  Z74 09                   Subjective: No new reports    Objective: See treatment diary below      Assessment: Tolerated treatment well  Patient demonstrated fatigue post treatment, exhibited good technique with therapeutic exercises and would benefit from continued PT  Difficulty with L foot clearance with 6-inch jim forward and laterally, required min A at L LE during lateral hurdles moving to the L side  Able to tolerate 3 minute bouts at 1  6mph on TM, limited by fatigue  Continue to progress as tolerated  Plan: Continue per plan of care  Precautions: HTN, a-fib, pacemaker      Manuals 10/21 10/24 1026 11/3 11/7 11/9                                       Neuro Re-Ed         Standing marching         sidesteps // bars (short) x 4 laps with 2# AW on L LE  //bars (short)  2# L AW  X 4 laps // bars (short) 3# AW on L LE, x 4 laps  // bars (short) 3# AW on L LE, x 4 laps   HKM // bars (short) x 4 laps with 2# AW on L   // bars (short) x 3 laps with 3# AW on L  // bars (short) x 5 laps with 3# AW on L   Sit to stand with LE elevated R LE on 4" step x 15 reps, hold at top without UE support, eccentric lower with therapist in front with CGA Cues throughout session with tactile cues for symmetry with LE placement Cues throughout session with tactile cues for symmetry with LE placement  X 10 reps feet even with UE's, x 10 with R LE in front    STS x 10 reps, cues for foot positioning and hand positioning STS with UE's on knees and min A at trunk for anterior trunk lean, x 8 reps   Step taps         backwards // bars (short) x 3 laps with 2# AW on L LE     // bars (short) x 4 laps with 3# AW on L   Step over jim Forward/back with 6-inch jim with L LE with 2# AW x 10 reps, min A to clear stepping back  //bars (short)  Low hurdles (4) 2# L AW  X 2 laps    Low jim (1)  LLE w/2# AW x 10 rep Fwd/Bwd // bars with 6-inch hurdles (4) with 3# AW on L LE, x 3 laps forward  // bars  With 6-inch hurdles (4) with 3# AW on L LE, 3 laps forward, lateral x 2 laps (min A at L LE to clear jim)   Step up and over  8-inch step, leading with R ascending, L descending x 10 reps    6-inch step leading with L LE and back with R LE x 10 reps //bars (short)  2# L AW  6"step L ascending, R descending x 10 reps                        Ther Ex         Sit to stand         nustep         treadmill                                                          Ther Activity         Stand pivot transfer         Bed mobility     Transfer from wheelchair to/from mat with FWW and CS, sit to supine min A at L LE, rolling with min A, supine to sit with min-mod A  Rolling R/L x 10 each with push through LE, supine to sit x 10 reps, supine with push up through L UE x 10 reps    Bridging x 10 reps    Scooting x 5 reps each side    Supine to sit x 5 reps    Gait Training         Gait with SPC  SOLO  With SPC on R side, 1/4 lap x 4 laps  CGA and mod vc's for correct sequencing SOLO  With SPC on R side, 1/4 lap x 4 laps  And mod v c  for correct sequencing Hallway with SPC on R side, 100 feet with hand over hand and CGA, 2nd trial of 200 feet    3# AW on L LE     With FWW in hallway 1 trial of 500 feet with FWW and CGS/CS 1 trial of 500 feet with FWW and CGA/CS  1 trial of 500 feet with FWW and SPV, fast walking 6MWT 454 feet completed with FWW    TUG with FWW with CS    Gait speed    Gait training on TM  SOLO TM  1  2mph for 4 minutes with BUE support, cues for increased step length on L LE    HR 91, SpO2 99% post    2nd trial 1  2mph for 4 minutes SOLO TM   B/L UE  1 2 mph x 5 min good correction w/picking up B/L LE when scraping tread      1 2 mph x 5 min  SOLO  TM B/L UE 1 4mph x 5 minutes  Seated rest break  1 6 mph for 2 minutes SOLO  TM B/L UE   1 4-1  6mph for 3 minutes  Seated rest break    1  6mph for 3 minutes    Step up/down onto TM with CGA/min A and cues for sequencing   stairs         Modalities           Education for increased step count, increasing intensity of exercise for neuroplasticity, discussed using FWW to come to therapy vs manual w/c   Progress with therapy, bed mobility education, outcome measures and progress                        Access Code: [email or web address]  URL: [email or web address]  Date: 10/13/2022  Prepared by: Jack Sick    Exercises  · Seated Hamstring Stretch with Strap - 1 x daily - 7 x weekly - 3 sets - 30 hold  · Seated Gastroc Stretch with Strap - 1 x daily - 7 x weekly - 3 sets - 30 hold  · Seated Long Arc Quad - 1 x daily - 5 x weekly - 3 sets - 10 reps  · Seated March - 1 x daily - 5 x weekly - 3 sets - 10 reps  · Seated Heel Raise - 1 x daily - 5 x weekly - 3 sets - 10 reps  · Seated Hip Adduction Isometrics with Ball - 1 x daily - 5 x weekly - 3 sets - 10 reps - 5 hold  · Seated Hip Abduction - 1 x daily - 5 x weekly - 3 sets - 10 reps

## 2022-11-09 NOTE — PROGRESS NOTES
Occupational Therapy Daily Note:    Today's date: 2022  Patient name: Vida Linn  : 1940  MRN: [id number]  Referring provider: Carlos Baptiste PA-C  Dx:   Encounter Diagnoses   Name Primary? • History of CVA (cerebrovascular accident) Yes   • Need for assistance with personal care          Time:    5507-9689 Group  12 unsupervised    Subjective: "This is hard for me!" (cutting putty)     Objective: Pt engaged in skilled OT treatment session with focus on UE strengthening, UE endurance, FMC/GMC, FMS/GMS and body awareness to increase engagement, endurance, tolerance, and independence with daily ADL and IADL tasks  CPT Code Minutes                                           Task Details        Therapeutic Activity  Pt concluded with handwriting at table top copying over traced cursive letters then free hand writing repetitively for improved formation of letters, legibility, consistency over time and fluidity of writing skills  Neuro Re-Ed               Therapeutic Exercise  Pt tolerated 5 min x 2 prograde/retrograde motions at 1 3 resist sitting on UBE with focus on increasing proximal UE strength,  endurance, act tolerance, cardiopulmonary endurance, grasp on handles and ROM to inc indep and safety with ADL/IADL fxn and fxnl reaching tasks  Pt completed seated at table top puttycise tools with green/moderate resistance performing knob turns (standard) in CW/CCW position, cap turns in CW/CCW, peg turns, and key turns for improved distal FMS  Pt then completed cutting of putty using scissors for intrinsic hand strengthening  Manual          Self Care           Assessment: Tolerated treatment well  Patient would benefit from continued skilled OT  Pt demo F tolerance to added resistance on UBE today with a rest break between motions  Pt reported minimal challenge with UBE demand and moderate challenge with FMS using scissors    Pt demo smaller letter formation as letters formed over time       Plan: Continued skilled OT per POC    INTERVENTION COMMENTS:  Diagnosis: History of CVA (cerebrovascular accident) [Z86 73]  Precautions: Fall risk, pacemaker, No E-STIM   FOTO: Completed  Insurance: Payor: [facility][note 3] Vidhya Zuñiga REP / Plan: Lola Avalos / Product Type: Medicare HMO /   7 of 16 visits, PN due 11/10/2022   POC ends 01/02/2023

## 2022-11-09 NOTE — PROGRESS NOTES
SW has received an update from Geoff Hayes who received a call back from Javi Nunez ([phone])  Lexii Garcia stated that as of 11/7/22, patient has been approved for 63 hours per week (which is up from the 45 hours per week she had been receiving most recently)  Lexii Garcia stated that she will now be following up with patient and her son, Chalino Rosas, to make them aware of this and help coordinate the extra hours of care with [employer], patient's aide agency  SW has reached out to pt via  ID # [id number]  to confirm and to see if the new hours have started  Pt relates that she and her son do NOT agree and they told  they will APPEAL  Pt feels her other insurance will cover attended care while she is recovering from her stoke  JULIOCESAR reviewed with pt the it is the [facility] that provides the Attended Care not her [facility]    JULIOCESAR did share she should f/u with her her 20 [facility] re same

## 2022-11-11 ENCOUNTER — APPOINTMENT (OUTPATIENT)
Dept: OCCUPATIONAL THERAPY | Facility: CLINIC | Age: 82
End: 2022-11-11

## 2022-11-14 ENCOUNTER — OFFICE VISIT (OUTPATIENT)
Dept: PHYSICAL THERAPY | Facility: CLINIC | Age: 82
End: 2022-11-14

## 2022-11-14 ENCOUNTER — OFFICE VISIT (OUTPATIENT)
Dept: OCCUPATIONAL THERAPY | Facility: CLINIC | Age: 82
End: 2022-11-14

## 2022-11-14 DIAGNOSIS — Z74.1 NEED FOR ASSISTANCE WITH PERSONAL CARE: ICD-10-CM

## 2022-11-14 DIAGNOSIS — Z86.73 HISTORY OF CVA (CEREBROVASCULAR ACCIDENT): Primary | ICD-10-CM

## 2022-11-14 DIAGNOSIS — Z74.09 IMPAIRED FUNCTIONAL MOBILITY, BALANCE, GAIT, AND ENDURANCE: ICD-10-CM

## 2022-11-14 NOTE — PROGRESS NOTES
OCCUPATIONAL THERAPY Progress Note #1:      11/14/2022  Rodrigo Ch  1940  [id number]  Cherylin Denver, PA-C     Diagnosis ICD-10-CM Associated Orders   1  History of CVA (cerebrovascular accident)  Z86 73    2  Need for assistance with personal care  Z74 1      Patient was treated by SCOTT Vaughn under the supervision of [address] [place] [address], OTR/L  Time:   1/1  [id number] Group  [id number] Unsup    Assessment/Plan    Skilled Analysis:  Rodrigo Ch is a 80 y o  female referred to Occupational Therapy s/p History of CVA (cerebrovascular accident) [Z86 73]  Pt participated in skilled OT progress note #1 and following formalized testing as well as clinical observation, Pt reports improvements with strength in LUE, eating, picking up medications  Pt reports that sometimes her hand is strong and sometimes it is not  Pt reports she is still having difficulties with dressing, bathing, cooking, writing, praying with the roseary  Clinically, pt has shown improvements with ***  Pt continues to present with the following areas of deficit ***  Difficulties with HHA because the hours are so short, pt reports having HHA  2-11 and does not have someone in the morning    Needs more appointments in the morning because he has work at 1:30pm       Pt presents with the following areas of deficit:  LUE strength due to L hand dominance, sensation with LUE, prehension patterns, decreased endurance and speed/rate of manipulation, direction following and proprioception impacting ADL performance (dressing, bathing, and grooming tasks), IADLs (laundry, medication management, and cooking)  Pt's son assisted with subjective information requiring some directions in [place]  OTS created an addendum to address FM, stereognosis, proprioception, and rapid alternating motions  OTS addressed FM goals focusing on LUE rate of manipulation and b/l coordination for functional ADL/IADL performance   Pt will benefit from skilled Occupational Therapy services 2x/week for 8 weeks with focus on HEP for carryover with safety during ADLs/IADLs, UE strength, proprioception, sensation, and FMC/GMC for overall QOL  Glorya Else is in agreement with POC  Motor Short Term Goals (4-6 weeks)    Strength/Endurance  ·  Pt will increase LUE proximal shoulder strength to 4+/5  through the use of strengthening exercises and HEP for improving performance during ADL/IADLs for eventual return to life roles  · Pt will increase LUE distal forearm/wrist strength to 4+/5 through the use of strengthening exercises and HEP for eventual return to life roles and independence with ADL/IADLs  · Pt will demo with G tolerance to supine, seated, and in stance exercise x 15-20 minutes with minimal rest breaks required for increased engagement in life roles and increased independence with ADLs/IADLs  · Pt will demo with G carryover of HEP to improve functional progression towards goals in Plan of care and for improved functional use of LUE  FMC/GMC   · Pt will increase LUE rate of manipulation by 10% for all FM tests for improved functional performance with salient tasks   · Pt will demo improved motor learning of constructional and new motor actions for improved b/l coordination and motor planning evident by 75% accuracy for fxnl ADL/IADL performance    Functional Performance   · Pt will increase LUE to functional assist (UE has full AROM, uses for all activities and fine motor tasks, but remains assistive with mild awkwardness and weakness) for ADL/IADLs and tabletop tasks for improved functional performance of life roles  · Pt will demo G comprehension of adaptive equipment (long handled reacher/sponge/shoehorn, toileting aid) use in clinic to improve independence in ADL management in home environment    · Pt will increase proprioception of LUE hand to target for improved functional reach vision occluded with ADL tasks  x 75% accuracy   · Pt will require < moderate assist for ADLs (dressing, showering, toileting, grooming) for improved independence with all self-care tasks  · Pt will be able to manage all medications, laundry, and cooking with < Moderate assist from son in order to increase IADL independence         Motor Long Term Goals (12 weeks)     Strength/Endurance  ·  Pt will increase LUE proximal shoulder strength to 5/5  through the use of strengthening exercises and HEP for improving performance during ADLs/IADLs for eventual return to life roles  · Pt will increase LUE distal forearm/wrist strength to 5/5  through the use of strengthening exercises and HEP for eventual return to life roles and independence with ADL/IADLs  · Pt will demo with G tolerance to supine, seated, and in stance exercise x 25-35 minutes with minimal rest breaks required for increased engagement in life roles and increased independence with ADL/IADLs  FMC/GMC   · Pt will increase LUE rate of manipulation by 15% for all FM tests for improved functional performance with salient tasks   · Pt will demo improved motor learning of constructional and new motor actions for improved b/l coordination and motor planning evident by 90% accuracy for fxnl ADL/IADL performance    Functional Performance   · Pt will increase LUE to fully functional assist (completely functional, used as dominant UE) for ADL/IADL and tabletop tasks for improved functional performance of life roles  · Pt will demo excellent comprehension of adaptive equipment (long handled reacher/sponge/shoehorn, toileting aid) use in clinic to improve independence in ADL management in home environment     · Pt will increase proprioception of LUE hand to target for improved functional reach vision occluded with ADL tasks  x 90% accuracy  · Pt will completed ADLs (dressing, showering, toileting, grooming) with modified independence for improved independence with all self-care tasks  · Pt will be able to manage all medications, laundry, and cooking with Modified [place] in order to increase IADL independence         Subjective    SUBJECTIVE: Per pt's son "I think she needs more strength in her legs and she has difficulties with everything since her stroke"    PATIENT GOAL: "I would like to have 100% again"     Patient-Specific Functional Scale   Task is scored 0 (unable to perform activity) to 10 (ability to perform activity independently)    Activity Date:10/10/2022 Date:   1  Dressing (shirts and pants) 2-3/10 Pt unable to dress self without assistance 4-5/10 Still needs assistance with putting shirts on   2  Bathing 4/10 4/10 Remained    3  Strength of LUE Arm 5/10  Leg 2/10 5/10 remained   4  Grasping/pinching 5/10 Pt has difficulty with picking up pills and small objects 3/10 Pt still having difficulties picking up pills       HISTORY OF PRESENT ILLNESS:     Reilly Wheatlye is a 80 y o  female who was referred to Occupational Therapy s/p  History of CVA (cerebrovascular accident) [Z86 73]  [facility]-8/19/2022 presenting with weak LUE, flaccid LLE, left facial droop, mild dysarthria, and mild sensory deficits on the left side  Pt reported she had difficulty moving from her bed on 8/12/2022, son called ambulance  CTA revealed near occlusive thrombus at R MCA bifurcation  Pt was started on stroke protocol heparin gtt (drops)  Pt changed medications from Xarelto to Pradaxa (think Xarelto failure was cause of stroke)  Pt d/c to [facility] rehab where she received OT, PT, ST from 8/13/2022-9/13/2022  Pt transitioned to [facility] where she was d/c 9/24/2022 home to her family  Pt has PMH of a-fib on Xarelto (now changed) with MRI-incompatible pacemaker, osteoarthritis in both wrists, HLD, and HTN  Pt resides in an apartment on the 8th floor with GARRY but uses the elevator  With son close by, pt is using walker to get around her apartment   Pt's son is currently living with her for assistance with ADLs/IADLs  Pt is completing ADLs with assistance from son  Pt is currently using DME (walker, w/c, toilet commode, shower chair)  Looking into getting a medical bed for repositioning and upright posture due to risk for aspiration/coughing  Pt is not currently using any AE  Pt likes to shop, go to [employer], watching tv  Pt has 3 grandchildren  Pt is currently retired but used to work with [employer]  Pt was independent prior to the stroke  Pt son applied for pt to get into a lower level apartment in case of emergencies  Possibly looking for HHA  Pt presenting with ongoing difficulties with: ADLs/IADLs (bathing, eating, dressing), pinching pills, doing laundry, vision blurriness, reaching with LUE and LUE strength  PMH:   Past Medical History:   Diagnosis Date   • A-fib ([facility] [address] )    • Anemia    • Arthritis    • Breast pain, right    • Chest pain on breathing    • Colon polyp    • Cough    • Diverticulosis    • Encounter for screening colonoscopy    • H/O sick sinus syndrome    • Heart murmur    • High cholesterol    • History of atrial fibrillation     Rate ontrolled  On xarelto 15mg (creatinine 50) Followed by Dr Feliz Jackson with Cardiology    • History of weight loss     Report loose fitting clothes  Weight stable (3lbs difference)  Last colo showed small tubular adenoma x2  Breast biopsy last showed fibrocystic changes  TSH wnl  Will check cbc, bmp, spep         • Hx of long term use of blood thinners    • Hypertension    • Irregular heart beat    • Pacemaker    • Preop examination    • Shortness of breath    • Viral bronchitis        Pain Levels   Restin/10 - Pain in her L hip/leg   Pain is mainly when she is in bed trying to get comfortable    With Activity:   Some pain in her leg (numbness) and decreased balance after walking with walker    Objective    Impairment Observations:    SEGUNDO CHANG Comments           UPPER EXTREMITY FUNCTION   Intact Impaired Dominant Hand: Left /PINCH STRENGTH             Dynamometer    - Gross Grasp 40 lns 30 lbs low   R increased 4 lbs  L decreased 5 lbs     Pinch Meter     - Pincer 8 lbs 8 lbs low   R remained  L remained    - Tripod 10 lbs 8 lbs   low   R remained  L decreased 2 lbs    - Lateral 9 lbs  8 lbs low   R remained  L remained     AROM (Seated)         Difficulty following directions with movements    Elevation Full 1/2 Remained   Shoulder FF Full  130*  Remained   Shoulder Ext Full  68* L Increased 3*   Shoulder Abd Full  Full     Shoulder Add Full  Full     Horizontal Abd Full  Full     Horizontal Add Full  WFL     Elbow Flex Full  WFL     Elbow Ext Full  Full     Pronation Full  Full     Supination Full  Full     Wrist Flex Full  40*  Remained   Wrist Ext Full  21*  Remained   Digit Flex Full  Full     Digit Ex Full  Full     Hook Grasp Full  Full     Subluxation  None  None       MMT             Shoulder FF 4+/5 3+/5    Shoulder Ext 4+/5 4/5    Shoulder Abduction 4/5 4/5    Shoulder Adduction 4/5 3+/5    Elbow Flex 4+/5 4+/5    Elbow Ext 4+/5 4/5    Wrist Flex 4-/5 4-/5    Wrist Ext 4/5 4/5      SENSATION      Monofilament Testing  Normal: 2 83 mm    Diminished light touch: 3 61 mm    Diminished protective sensation: 4 31 mm    Loss of protective sensation: 4 56    Complete loss of sensation / deep pressure: 6 65 3 61 mm 3 61 mm Pt unable to feel 2 83 mm on L/RUE transitioned to 3 61mm and able to feel on entire LUE/RUE    L/R remained   Sharp / Dull  Intact Intact    Proprioception Intact Impaired    Hot/Cold Temp Intact Intact    Stereognosis  Intact Intact      COORDINATION      Opposition Intact WFL WFL but slow movements with LUE   Finger to Nose WFL Impaired Slow movements with vision occluded   Rapid Alternating Movement Impaired Impaired Slow movements, uncoordinated with both L and R hand   9 Hole Peg Test 33 seconds 43 seconds low   R increased 9 seconds  L increased 11 seconds      Fxnl Dexterity Test 38 seconds    Used board 100% of the time to assist in rotation of pegs 57 seconds    Used board 100% to rotate pegs, needed increased verbal cuing for directions low   R increased 6 seconds  L decreased 1 second   Moose Hand Function Test         - Handwriting    not examined      - Card Turning   not examined      - Small Item p/u   not examined      - Simulated Feeding   not examined      - Stacking Checkers   not examined      - Moving Light Objects   not examined      - Moving Heavy Objects   not examined   Concentric Circles Test (tremors)          TONE: MODIFIED BERTIN SCALE     Will further assess*   No increase in muscle tone (0)      Slight Increase in muscle tone with catch and release or min resist at end range (1)      Slight Increase in muscle tone with catch and release, followed by min resistance through remainder of range (1+)      Increased muscle tone through full range, able to be moved easily (2)      Considerable increase in tone, difficult to move (3)      Rigid in Flexion/Extension (4)              Today's session:    Thera act: Pt engaged in FM activity with resistive red clip in L hand and orange webbed basket to reach for cotton balls and place the specific colors in the colored cones focusing on concentration, cognition, FMC, and hand/target accuracy  Pt required taking 2 breaks during the activity and increased time to complete due to decreased hand strength and fatigue         OTHER PLANNED THERAPY INTERVENTIONS:    Supine, seated, and in stance neuro re-ed  Task-Oriented Training  Tricep AG  Hot Packs for pain  FMC/prehension  GMC  Proximal to distal teaming  Timed Trials  Manual tx  Hand to target  Sensory re-ed (Cutaneous/Proprioceptive)  Seated functional reach: crossing midline  Supine place and hold   WBearing strategies   Closed chain activities  Open chain activities  HEP      INTERVENTION COMMENTS:  Diagnosis: History of CVA (cerebrovascular accident) [Z86 73]  Precautions: Fall risk, pacemaker, No E-STIM   FOTO: Completed  Insurance: Payor: [id number] REP / Plan: Deborah Souza / Product Type: Medicare HMO /   8 of 16 visits, PN due 12/10/2022  POC ends 01/02/2023

## 2022-11-14 NOTE — PROGRESS NOTES
OCCUPATIONAL THERAPY Progress Note #1:      11/14/2022  Lawyer Dangelo  1940  [id number]  Kathryn Schultz PA-C     Diagnosis ICD-10-CM Associated Orders   1  History of CVA (cerebrovascular accident)  Z86 73    2  Need for assistance with personal care  Z74 1      Patient was treated by SCOTT Ludwig under the supervision of [address] [place] [address], OTR/L  Time:   1/1  [id number] Group  [id number] Unsup    Assessment/Plan    Skilled Analysis:  Lawyer Dangelo is a 80 y o  female referred to Occupational Therapy s/p History of CVA (cerebrovascular accident) [Z86 73]  Pt participated in skilled OT progress note #1 and following formalized testing as well as clinical observation, Pt reports improvements with strength in LUE, improving with using utensils in L hand, and overall using L arm more for tasks during the day due to L hand dominance  Pt reports that "sometimes my hand is strong and sometimes it is not"; dependent on the day  Pt believes OT is helping her and she would like to continue coming to therapy to increase her LUE strength  Pt reports she is still needing assistance from [facility] [place] [place] and son with dressing, bathing, and cooking  Pt reports continued difficulties with handwriting and [address] picking up pills and reaching overhead  Pt reports they are having difficulties with [facility] hours due to them getting cut short each day; HHA is now coming from 2-11pm and son will take pt to appointments in the morning when possible  Clinically, pt has shown improvements with FMC/FMS of L hand, increased standing tolerance during activities, and increased in hand manipulation seen through functional dexterity test and nine hole peg   Pt continues to present with the following areas of deficit: LUE strength due to L hand dominance, sensation with LUE, prehension patterns, decreased endurance and speed/rate of manipulation, direction following and proprioception impacting ADL performance (dressing and bathing), IADLs (laundry and cooking)  Pt will benefit from continued skilled Occupational Therapy services 2x/week for 8 weeks with focus on HEP for carryover with safety during ADLs/IADLs, UE strength, proprioception, sensation, and FMC/GMC for overall QOL  Jelena Avilez is in agreement with POC  Motor Short Term Goals (4-6 weeks)    Strength/Endurance  ·  Pt will increase LUE proximal shoulder strength to 4+/5  through the use of strengthening exercises and HEP for improving performance during ADL/IADLs for eventual return to life roles  = NOT MET  · Pt will increase LUE distal forearm/wrist strength to 4+/5 through the use of strengthening exercises and HEP for eventual return to life roles and independence with ADL/IADLs  = NOT MET  · Pt will demo with G tolerance to supine, seated, and in stance exercise x 15-20 minutes with minimal rest breaks required for increased engagement in life roles and increased independence with ADLs/IADLs  = PARTIALLY MET  · Pt will demo with G carryover of HEP to improve functional progression towards goals in Plan of care and for improved functional use of LUE  = PARTIALLY MET    FMC/GMC   · Pt will increase LUE rate of manipulation by 10% for all FM tests for improved functional performance with salient tasks = NOT MET  · Pt will demo improved motor learning of constructional and new motor actions for improved b/l coordination and motor planning evident by 75% accuracy for fxnl ADL/IADL performance = PARTIALLY MET    Functional Performance   · Pt will increase LUE to functional assist (UE has full AROM, uses for all activities and fine motor tasks, but remains assistive with mild awkwardness and weakness) for ADL/IADLs and tabletop tasks for improved functional performance of life roles  = PARTIALLY MET  · Pt will demo G comprehension of adaptive equipment (long handled reacher/sponge/shoehorn, toileting aid) use in clinic to improve independence in ADL management in home environment   = NOT MET  · Pt will increase proprioception of LUE hand to target for improved functional reach vision occluded with ADL tasks  x 75% accuracy = PARTIALLY MET  · Pt will require < moderate assist for ADLs (dressing, showering, toileting, grooming) for improved independence with all self-care tasks = PARTIALLY MET  · Pt will be able to manage all medications, laundry, and cooking with < Moderate assist from son in order to increase IADL independence = PARTIALLY MET        Motor Long Term Goals (12 weeks)     Strength/Endurance  ·  Pt will increase LUE proximal shoulder strength to 5/5  through the use of strengthening exercises and HEP for improving performance during ADLs/IADLs for eventual return to life roles  · Pt will increase LUE distal forearm/wrist strength to 5/5  through the use of strengthening exercises and HEP for eventual return to life roles and independence with ADL/IADLs  · Pt will demo with G tolerance to supine, seated, and in stance exercise x 25-35 minutes with minimal rest breaks required for increased engagement in life roles and increased independence with ADL/IADLs  FMC/GMC   · Pt will increase LUE rate of manipulation by 15% for all FM tests for improved functional performance with salient tasks   · Pt will demo improved motor learning of constructional and new motor actions for improved b/l coordination and motor planning evident by 90% accuracy for fxnl ADL/IADL performance    Functional Performance   · Pt will increase LUE to fully functional assist (completely functional, used as dominant UE) for ADL/IADL and tabletop tasks for improved functional performance of life roles  · Pt will demo excellent comprehension of adaptive equipment (long handled reacher/sponge/shoehorn, toileting aid) use in clinic to improve independence in ADL management in home environment     · Pt will increase proprioception of LUE hand to target for improved functional reach vision occluded with ADL tasks  x 90% accuracy  · Pt will completed ADLs (dressing, showering, toileting, grooming) with modified independence for improved independence with all self-care tasks  · Pt will be able to manage all medications, laundry, and cooking with Modified [place] in order to increase IADL independence         Subjective    SUBJECTIVE: Per pt's son "I think she needs more strength in her legs and she has difficulties with everything since her stroke"    PATIENT GOAL: "I would like to have 100% again"     Patient-Specific Functional Scale   Task is scored 0 (unable to perform activity) to 10 (ability to perform activity independently)    Activity Date:10/10/2022 Date: 11/14/2022   1  Dressing (shirts and pants) 2-3/10 Pt unable to dress self without assistance 4-5/10 Still needs assistance with donning shirts and pants   2  Bathing 4/10 4/10 Remained    3  Strength of LUE Arm 5/10  Leg 2/10 5/10 Remained   4  Grasping/pinching 5/10 Pt has difficulty with picking up pills and small objects 3/10 Pt still having difficulties picking up pills; dropping often  HISTORY OF PRESENT ILLNESS:     Iker Aparicio is a 80 y o  female who was referred to Occupational Therapy s/p  History of CVA (cerebrovascular accident) [Z86 73]  Pt with recent hospitalization from [facility] from 8/12/2022-8/19/2022 presenting with weak LUE, flaccid LLE, left facial droop, mild dysarthria, and mild sensory deficits on the left side  Pt reported she had difficulty moving from her bed on 8/12/2022, son called ambulance  CTA revealed near occlusive thrombus at R MCA bifurcation  Pt was started on stroke protocol heparin gtt (drops)  Pt changed medications from Xarelto to Pradaxa (think Xarelto failure was cause of stroke)  Pt d/c to [facility] rehab where she received OT, PT, ST from 8/13/2022-9/13/2022  Pt transitioned to [facility] where she was d/c 9/24/2022 home to her family   Pt has PMH of a-fib on Xarelto (now changed) with MRI-incompatible pacemaker, osteoarthritis in both wrists, HLD, and HTN  Pt resides in an apartment on the 8th floor with GARRY but uses the elevator  With son close by, pt is using walker to get around her apartment  Pt's son is currently living with her for assistance with ADLs/IADLs  Pt is completing ADLs with assistance from son  Pt is currently using DME (walker, w/c, toilet commode, shower chair)  Looking into getting a medical bed for repositioning and upright posture due to risk for aspiration/coughing  Pt is not currently using any AE  Pt likes to shop, go to [employer], watching tv  Pt has 3 grandchildren  Pt is currently retired but used to work with [employer]  Pt was independent prior to the stroke  Pt son applied for pt to get into a lower level apartment in case of emergencies  Possibly looking for HHA  Pt presenting with ongoing difficulties with: ADLs/IADLs (bathing, eating, dressing), pinching pills, doing laundry, vision blurriness, reaching with LUE and LUE strength  PMH:   Past Medical History:   Diagnosis Date   • A-fib ([facility] [address] )    • Anemia    • Arthritis    • Breast pain, right    • Chest pain on breathing    • Colon polyp    • Cough    • Diverticulosis    • Encounter for screening colonoscopy    • H/O sick sinus syndrome    • Heart murmur    • High cholesterol    • History of atrial fibrillation     Rate ontrolled  On xarelto 15mg (creatinine 50) Followed by Dr Zbigniew Murray with Cardiology    • History of weight loss     Report loose fitting clothes  Weight stable (3lbs difference)  Last colo showed small tubular adenoma x2  Breast biopsy last showed fibrocystic changes  TSH wnl  Will check cbc, bmp, spep         • Hx of long term use of blood thinners    • Hypertension    • Irregular heart beat    • Pacemaker    • Preop examination    • Shortness of breath    • Viral bronchitis        Pain Levels   Restin/10 - Pain in her L hip/leg   Pain is mainly when she is in bed trying to get comfortable    With Activity:   Some pain in her leg (numbness) and decreased balance after walking with walker    Objective    Impairment Observations:    SEGUNDO CHANG Comments           UPPER EXTREMITY FUNCTION   Intact Impaired Dominant Hand: Left     /PINCH STRENGTH             Dynamometer    - Gross Grasp 40 lns 30 lbs low   R increased 4 lbs  L decreased 5 lbs     Pinch Meter     - Pincer 8 lbs 8 lbs low   R remained  L remained    - Tripod 10 lbs 8 lbs   low   R remained  L decreased 2 lbs    - Lateral 9 lbs  8 lbs low   R remained  L remained     AROM (Seated)         Difficulty following directions with movements    Elevation Full 1/2 Remained   Shoulder FF Full  130*  Remained   Shoulder Ext Full  68* L Increased 3*   Shoulder Abd Full  Full     Shoulder Add Full  Full     Horizontal Abd Full  Full     Horizontal Add Full  WFL     Elbow Flex Full  WFL     Elbow Ext Full  Full     Pronation Full  Full     Supination Full  Full     Wrist Flex Full  40*  Remained   Wrist Ext Full  21*  Remained   Digit Flex Full  Full     Digit Ex Full  Full     Hook Grasp Full  Full     Subluxation  None  None       MMT             Shoulder FF 4+/5 3+/5 Remained   Shoulder Ext 4+/5 4/5 Remained   Shoulder Abduction 4/5 4/5 Remained   Shoulder Adduction 4/5 3+/5 Remained   Elbow Flex 4+/5 4+/5 Remained   Elbow Ext 4+/5 4/5 Remained   Wrist Flex 4+/5 4-/5 R improved   Wrist Ext 4/5 4/5 Remained     SENSATION      Monofilament Testing  Normal: 2 83 mm    Diminished light touch: 3 61 mm    Diminished protective sensation: 4 31 mm    Loss of protective sensation: 4 56    Complete loss of sensation / deep pressure: 6 65 3 61 mm 3 61 mm L/R remained still unable to feel 2 83 mm on L/RUE    Sharp / Dull  Intact Intact    Proprioception Intact Impaired    Hot/Cold Temp Intact Intact    Stereognosis  Intact Intact      COORDINATION      Opposition Intact WFL WFL but slow movements with LUE   Finger to Nose WFL Impaired Slow movements with vision occluded   Rapid Alternating Movement Impaired Impaired Slow movements, uncoordinated with both L and R hand   9 Hole Peg Test 33 seconds 43 seconds low   R increased 9 seconds  L increased 11 seconds      Fxnl Dexterity Test 38 seconds    Used board 100% of the time to assist in rotation of pegs 57 seconds    Used board 100% to rotate pegs, needed increased verbal cuing for directions low   R increased 6 seconds  L decreased 1 second   Moose Hand Function Test         - Handwriting    not examined      - Card Turning   not examined      - Small Item p/u   not examined      - Simulated Feeding   not examined      - Stacking Checkers   not examined      - Moving Light Objects   not examined      - Moving Heavy Objects   not examined   Concentric Circles Test (tremors)          TONE: MODIFIED BERTIN SCALE     Will further assess*   No increase in muscle tone (0)      Slight Increase in muscle tone with catch and release or min resist at end range (1)      Slight Increase in muscle tone with catch and release, followed by min resistance through remainder of range (1+)      Increased muscle tone through full range, able to be moved easily (2)      Considerable increase in tone, difficult to move (3)      Rigid in Flexion/Extension (4)              Today's session:    Thera act: Pt engaged in FM activity with resistive red clip in L hand and orange webbed basket to reach for cotton balls and place the specific colors in the colored cones focusing on concentration, cognition, FMC, and hand/target accuracy  Pt required taking 2 breaks during the activity and increased time to complete due to decreased hand strength and fatigue         OTHER PLANNED THERAPY INTERVENTIONS:    Supine, seated, and in stance neuro re-ed  Task-Oriented Training  Tricep AG  Hot Packs for pain  FMC/prehension  GMC  Proximal to distal teaming  Timed Trials  Manual tx  Hand to target  Sensory re-ed (Cutaneous/Proprioceptive)  Seated functional reach: crossing midline  Supine place and hold   WBearing strategies   Closed chain activities  Open chain activities  HEP      INTERVENTION COMMENTS:  Diagnosis: History of CVA (cerebrovascular accident) [Z86 73]  Precautions: Fall risk, pacemaker, No E-STIM   FOTO: Completed  Insurance: Payor: [address] / Plan: Didi Armstrong / Product Type: Medicare HMO /   8 of 16 visits, PN due 12/14/2022  POC ends 01/02/2023

## 2022-11-14 NOTE — PROGRESS NOTES
Daily Note     Today's date: 2022  Patient name: Hari Mcneill  : 1940  MRN: [id number]  Referring provider: Angella Franco PA-C  Dx:   Encounter Diagnosis     ICD-10-CM    1  History of CVA (cerebrovascular accident)  Z86 73    2  Impaired functional mobility, balance, gait, and endurance  Z74 09                   Subjective: Patient reports she had a good weekend  She reports she continues to need help sometimes getting up from low surfaces and in/out of bed  Objective: See treatment diary below      Assessment: Tolerated treatment well  Patient demonstrated fatigue post treatment, exhibited good technique with therapeutic exercises and would benefit from continued PT  Improved ability to get legs in/out of bed with cues to increase speed of movement  Improved ability to perform sit to stand with cues to increase anterior trunk lean and maintain forward lean and avoiding lean backward  Patient appeared very frustrated with level of challenge with backwards walking with weights, weight was removed  Resume TM ambulation next session  Continue to progress as tolerated  Plan: Continue per plan of care        Precautions: HTN, a-fib, pacemaker      Manuals 10/21 10/24 1026 11/3 11/7 11/9 11/14                                           Neuro Re-Ed          Standing marching          sidesteps // bars (short) x 4 laps with 2# AW on L LE  //bars (short)  2# L AW  X 4 laps // bars (short) 3# AW on L LE, x 4 laps  // bars (short) 3# AW on L LE, x 4 laps SOLO  With SPC on R, 1/4 lap x 3 laps (no weights)   HKM // bars (short) x 4 laps with 2# AW on L   // bars (short) x 3 laps with 3# AW on L  // bars (short) x 5 laps with 3# AW on L SOLO   With SPC on R, 1/4 lap x 3 laps, (no weights)   Sit to stand with LE elevated R LE on 4" step x 15 reps, hold at top without UE support, eccentric lower with therapist in front with CGA Cues throughout session with tactile cues for symmetry with LE placement Cues throughout session with tactile cues for symmetry with LE placement  X 10 reps feet even with UE's, x 10 with R LE in front  STS x 10 reps, cues for foot positioning and hand positioning STS with UE's on knees and min A at trunk for anterior trunk lean, x 8 reps STS with hands on knees and CGA/CS for anterior trunk lean 2 x 10   Step taps          backwards // bars (short) x 3 laps with 2# AW on L LE     // bars (short) x 4 laps with 3# AW on L SOLO  1/4 lap with SPC on R, 3# AW on L, x 3 laps   Step over jim Forward/back with 6-inch jim with L LE with 2# AW x 10 reps, min A to clear stepping back  //bars (short)  Low hurdles (4) 2# L AW  X 2 laps    Low jim (1)  LLE w/2# AW x 10 rep Fwd/Bwd // bars with 6-inch hurdles (4) with 3# AW on L LE, x 3 laps forward  // bars  With 6-inch hurdles (4) with 3# AW on L LE, 3 laps forward, lateral x 2 laps (min A at L LE to clear jim)    Step up and over  8-inch step, leading with R ascending, L descending x 10 reps    6-inch step leading with L LE and back with R LE x 10 reps //bars (short)  2# L AW  6"step L ascending, R descending x 10 reps                           Ther Ex          Sit to stand          nustep          treadmill                                                                Ther Activity          Stand pivot transfer          Bed mobility     Transfer from wheelchair to/from mat with FWW and CS, sit to supine min A at L LE, rolling with min A, supine to sit with min-mod A  Rolling R/L x 10 each with push through LE, supine to sit x 10 reps, supine with push up through L UE x 10 reps    Bridging x 10 reps    Scooting x 5 reps each side    Supine to sit x 5 reps  Sit to supine x 10 each direction, cues for increased amplitude of movement   Gait Training          Gait with SPC  SOLO  With SPC on R side, 1/4 lap x 4 laps  CGA and mod vc's for correct sequencing SOLO  With SPC on R side, 1/4 lap x 4 laps   And mod v c  for correct sequencing Hallway with SPC on R side, 100 feet with hand over hand and CGA, 2nd trial of 200 feet    3# AW on L LE   SOLO  SPC on R, 1/4 lap x 3 laps, 2 laps with 3# AW on L   With FWW in hallway 1 trial of 500 feet with FWW and CGS/CS 1 trial of 500 feet with FWW and CGA/CS  1 trial of 500 feet with FWW and SPV, fast walking 6MWT 454 feet completed with FWW    TUG with FWW with CS    Gait speed  500 feet total, fast walking with CS   Gait training on TM  SOLO TM  1  2mph for 4 minutes with BUE support, cues for increased step length on L LE    HR 91, SpO2 99% post    2nd trial 1  2mph for 4 minutes SOLO TM   B/L UE  1 2 mph x 5 min good correction w/picking up B/L LE when scraping tread  1 2 mph x 5 min  SOLO  TM B/L UE 1 4mph x 5 minutes  Seated rest break  1 6 mph for 2 minutes SOLO  TM B/L UE   1 4-1  6mph for 3 minutes  Seated rest break    1  6mph for 3 minutes    Step up/down onto TM with CGA/min A and cues for sequencing    stairs          Modalities            Education for increased step count, increasing intensity of exercise for neuroplasticity, discussed using FWW to come to therapy vs manual w/c   Progress with therapy, bed mobility education, outcome measures and progress                           Access Code: [email or web address]  URL: [email or web address]  Date: 10/13/2022  Prepared by: Katelin Flores    Exercises  · Seated Hamstring Stretch with Strap - 1 x daily - 7 x weekly - 3 sets - 30 hold  · Seated Gastroc Stretch with Strap - 1 x daily - 7 x weekly - 3 sets - 30 hold  · Seated Long Arc Quad - 1 x daily - 5 x weekly - 3 sets - 10 reps  · Seated March - 1 x daily - 5 x weekly - 3 sets - 10 reps  · Seated Heel Raise - 1 x daily - 5 x weekly - 3 sets - 10 reps  · Seated Hip Adduction Isometrics with Ball - 1 x daily - 5 x weekly - 3 sets - 10 reps - 5 hold  · Seated Hip Abduction - 1 x daily - 5 x weekly - 3 sets - 10 reps

## 2022-11-16 ENCOUNTER — OFFICE VISIT (OUTPATIENT)
Dept: OCCUPATIONAL THERAPY | Facility: CLINIC | Age: 82
End: 2022-11-16

## 2022-11-16 ENCOUNTER — OFFICE VISIT (OUTPATIENT)
Dept: PHYSICAL THERAPY | Facility: CLINIC | Age: 82
End: 2022-11-16

## 2022-11-16 DIAGNOSIS — Z86.73 HISTORY OF CVA (CEREBROVASCULAR ACCIDENT): Primary | ICD-10-CM

## 2022-11-16 DIAGNOSIS — Z74.1 NEED FOR ASSISTANCE WITH PERSONAL CARE: ICD-10-CM

## 2022-11-16 DIAGNOSIS — Z74.09 IMPAIRED FUNCTIONAL MOBILITY, BALANCE, GAIT, AND ENDURANCE: ICD-10-CM

## 2022-11-16 NOTE — PROGRESS NOTES
Occupational Therapy Daily Note:    Today's date: 2022  Patient name: Errol Storm  : 1940  MRN: [id number]  Referring provider: Wellington Martin PA-C  Dx:   Encounter Diagnoses   Name Primary? • History of CVA (cerebrovascular accident) Yes   • Need for assistance with personal care         Patient was treated by SCOTT Ramirez under the supervision of [address] [place] [address], OTR/L  Subjective: "It is not perfect but it is there" (handwriting task)    Objective: Pt engaged in skilled OT treatment session with focus on UE strengthening, UE endurance, FMC/GMC, FMS/GMS and body awareness to increase engagement, endurance, tolerance, and independence with daily ADL and IADL tasks  CPT Code Minutes                                           Task Details        Therapeutic Activity  Pt engaged in cursive handwriting at table top copying over traced letters then free hand writing each letter for improved letter formation and consistency when writing  Pt upgraded to writing numbers in cursive and short sentences to work on word formation, line adherence, and writing endurance for longer time periods  Pt benefited from increased verbal/visual cuing  Neuro Re-Ed               Therapeutic Exercise  Pt tolerated 4 min x 2 prograde/retrograde motions at no resist sitting on UBE with focus on increasing proximal UE strength,  endurance, act tolerance, cardiopulmonary endurance, grasp on handles and ROM to inc indep and safety with ADL/IADL fxn and fxnl reaching tasks  Pt unable to keep up with 1 3 resistance today  Pt engaged in strengthening exercises in stance at mirror with #2 DBs focusing on UE strength, increased ROM, flexibility, and mobility  Exercises-  Bicep curls: 2 set x 10  Bicep curls (Neural): 2 set x 10   Shoulder FF: 2 set x 10  Shoulder Press (unilateral) : 2 set x 10        Manual          Self Care           Assessment: Tolerated treatment well    Pt demo fair tolerance to UBE today, unable to keep up with 1 3 resistance/keep screen on  Pt transitioned between quick and slow bursts during UBE  Pt transitioned to strengthening with fair tolerance to DBs  Pt demo increased difficulty with shoulder FF and shoulder press due to decreased ROM and decreased eccentric/concentric control  OTS provided pt increased verbal cuing for proper form and control due to decreased direction following and kinesthetic awareness  Pt benefited from focusing on unilateral shoulder press due to decreased ROM/weakness in LUE  Pt transitioned to handwriting with increased assistance with letter formation  Pt benefited from verbal/visual cuing to assist with consistent letter formation  Pt demo inconsistent letter formation, line adherence, and spacing with massed practice during sentence writing  Pt reported she would like to continue working on handwriting  Patient would benefit from continued skilled OT           Plan: Continued skilled OT per POC    INTERVENTION COMMENTS:  Diagnosis: History of CVA (cerebrovascular accident) [Z86 73]  Precautions: Fall risk, pacemaker, No E-STIM   FOTO: Completed  Insurance: Payor: [facility] [address] / Plan: Estrada Nixon / Product Type: Medicare HMO /   9 of 16 visits, PN due 12/14/2022  POC ends 01/02/2023

## 2022-11-16 NOTE — PROGRESS NOTES
Daily Note     Today's date: 2022  Patient name: Keane Olszewski  : 1940  MRN: [id number]  Referring provider: Graciela Milan PA-C  Dx:   Encounter Diagnosis     ICD-10-CM    1  History of CVA (cerebrovascular accident)  Z86 73       2  Impaired functional mobility, balance, gait, and endurance  Z74 09                      Subjective: No new reports  She states getting in/out of bed was a little better  Objective: See treatment diary below      Assessment: Tolerated treatment well  Patient demonstrated fatigue post treatment, exhibited good technique with therapeutic exercises and would benefit from continued PT  Patient with improving sequencing with SPC during session, and was able to tolerate exercises with 3# AW on L LE  TM unavailable during session (with SOLO)  Continues to require cues at trunk to encourage anterior trunk lean for sit to stand and even foot positioning  2 LOB during session, 1 with backwards walking, and 1x with step down from step backwards  Continue to progress as tolerated  Plan: Continue per plan of care  Precautions: HTN, a-fib, pacemaker      Manuals 11/3 11/7 11/9 11/14 11/16                                   Neuro Re-Ed        Standing marching        sidesteps // bars (short) 3# AW on L LE, x 4 laps  // bars (short) 3# AW on L LE, x 4 laps SOLO  With SPC on R, 1/4 lap x 3 laps (no weights) SOLO  With SPC on R, 1/4 lap x 3 laps and 3# AW on L LE   HKM // bars (short) x 3 laps with 3# AW on L  // bars (short) x 5 laps with 3# AW on L SOLO   With SPC on R, 1/4 lap x 3 laps, (no weights) SOLO  With SPC on R, 1/4 lap x 3 laps, 3# AW on L   Sit to stand with LE elevated X 10 reps feet even with UE's, x 10 with R LE in front    STS x 10 reps, cues for foot positioning and hand positioning STS with UE's on knees and min A at trunk for anterior trunk lean, x 8 reps STS with hands on knees and CGA/CS for anterior trunk lean 2 x 10 STS no UE support and CGA/min A x 10 reps    With R UE elevated on 4-inch step x 5 reps with single UE use   Step taps        backwards   // bars (short) x 4 laps with 3# AW on L SOLO  1/4 lap with SPC on R, 3# AW on L, x 3 laps SOLO  1/4 lap with SPC and 3# AW on R LE, x 3 laps   Step over jim // bars with 6-inch hurdles (4) with 3# AW on L LE, x 3 laps forward  // bars  With 6-inch hurdles (4) with 3# AW on L LE, 3 laps forward, lateral x 2 laps (min A at L LE to clear jim)     Step up     6-inch with SPC on R, x 10 leading with R, down with R    Up with L, down with L x 10    3# AW on L LE                   Ther Ex        Sit to stand        nustep        treadmill                                                    Ther Activity        Stand pivot transfer        Bed mobility  Transfer from wheelchair to/from mat with FWW and CS, sit to supine min A at L LE, rolling with min A, supine to sit with min-mod A  Rolling R/L x 10 each with push through LE, supine to sit x 10 reps, supine with push up through L UE x 10 reps    Bridging x 10 reps    Scooting x 5 reps each side    Supine to sit x 5 reps  Sit to supine x 10 each direction, cues for increased amplitude of movement    Gait Training        Gait with SPC Hallway with SPC on R side, 100 feet with hand over hand and CGA, 2nd trial of 200 feet    3# AW on L LE   SOLO  SPC on R, 1/4 lap x 3 laps, 2 laps with 3# AW on L SOLO  SPC on R 1/4 lap x 3 laps   With FWW in hallway 1 trial of 500 feet with FWW and SPV, fast walking 6MWT 454 feet completed with FWW    TUG with FWW with CS    Gait speed  500 feet total, fast walking with ' fast walking with SPV   Gait training on TM  SOLO  TM B/L UE 1 4mph x 5 minutes  Seated rest break  1 6 mph for 2 minutes SOLO  TM B/L UE   1 4-1  6mph for 3 minutes  Seated rest break    1  6mph for 3 minutes    Step up/down onto TM with CGA/min A and cues for sequencing     stairs        Modalities          Progress with therapy, bed mobility education, outcome measures and progress                        Access Code: [email or web address]  URL: [email or web address]  Date: 10/13/2022  Prepared by: Amina Edwards    Exercises  · Seated Hamstring Stretch with Strap - 1 x daily - 7 x weekly - 3 sets - 30 hold  · Seated Gastroc Stretch with Strap - 1 x daily - 7 x weekly - 3 sets - 30 hold  · Seated Long Arc Quad - 1 x daily - 5 x weekly - 3 sets - 10 reps  · Seated March - 1 x daily - 5 x weekly - 3 sets - 10 reps  · Seated Heel Raise - 1 x daily - 5 x weekly - 3 sets - 10 reps  · Seated Hip Adduction Isometrics with Ball - 1 x daily - 5 x weekly - 3 sets - 10 reps - 5 hold  · Seated Hip Abduction - 1 x daily - 5 x weekly - 3 sets - 10 reps

## 2022-11-17 ENCOUNTER — OFFICE VISIT (OUTPATIENT)
Dept: PHYSICAL THERAPY | Facility: CLINIC | Age: 82
End: 2022-11-17

## 2022-11-17 DIAGNOSIS — Z86.73 HISTORY OF CVA (CEREBROVASCULAR ACCIDENT): Primary | ICD-10-CM

## 2022-11-17 DIAGNOSIS — Z74.09 IMPAIRED FUNCTIONAL MOBILITY, BALANCE, GAIT, AND ENDURANCE: ICD-10-CM

## 2022-11-17 NOTE — PROGRESS NOTES
Daily Note     Today's date: 2022  Patient name: Raymond Gibson  : 1940  MRN: [id number]  Referring provider: Luana Carvajal PA-C  Dx:   Encounter Diagnosis     ICD-10-CM    1  History of CVA (cerebrovascular accident)  Z86 73       2  Impaired functional mobility, balance, gait, and endurance  Z74 09                      Subjective: Patient reports she continues to have difficulty with getting in and out of bed and up from a chair    Objective: See treatment diary below      Assessment: Tolerated treatment well  Patient demonstrated fatigue post treatment, exhibited good technique with therapeutic exercises and would benefit from continued PT  Session focused on transitional movements for sit to stand and sit to supine/supine to sit  Added tall kneeling and resisted sit to stand to assist with activation of posterior chain due to retropulsion, improved post  1 LOB with HKM due to leg giving out, required use of SOLO harness and mod A to attain standing  Able to tolerate for 2 trials of 4 minutes on TM, most to date  Continue to progress as tolerated  Plan: Continue per plan of care  Precautions: HTN, a-fib, pacemaker      Manuals 11/3 11/7 11/9 11/14 11/16 11/17                                       Neuro Re-Ed         Standing marching         sidesteps // bars (short) 3# AW on L LE, x 4 laps  // bars (short) 3# AW on L LE, x 4 laps SOLO  With SPC on R, 1/4 lap x 3 laps (no weights) SOLO  With SPC on R, 1/4 lap x 3 laps and 3# AW on L LE SOLO  With SPC on R, 1/4 lap x 3 laps   HKM // bars (short) x 3 laps with 3# AW on L  // bars (short) x 5 laps with 3# AW on L SOLO   With SPC on R, 1/4 lap x 3 laps, (no weights) SOLO  With SPC on R, 1/4 lap x 3 laps, 3# AW on L SOLO  With SPC on R, 1/4 lap x 3 laps  1 LOB requiring SOLO   Sit to stand with LE elevated X 10 reps feet even with UE's, x 10 with R LE in front    STS x 10 reps, cues for foot positioning and hand positioning STS with UE's on knees and min A at trunk for anterior trunk lean, x 8 reps STS with hands on knees and CGA/CS for anterior trunk lean 2 x 10 STS no UE support and CGA/min A x 10 reps    With R UE elevated on 4-inch step x 5 reps with single UE use STS no UE support with CGA x 10    X 10 with PB pulling behind     X 5 post tall kneel   Step taps         backwards   // bars (short) x 4 laps with 3# AW on L SOLO  1/4 lap with SPC on R, 3# AW on L, x 3 laps SOLO  1/4 lap with SPC and 3# AW on R LE, x 3 laps    Step over jim // bars with 6-inch hurdles (4) with 3# AW on L LE, x 3 laps forward  // bars  With 6-inch hurdles (4) with 3# AW on L LE, 3 laps forward, lateral x 2 laps (min A at L LE to clear jim)      Step up     6-inch with SPC on R, x 10 leading with R, down with R    Up with L, down with L x 10    3# AW on L LE    Tall kneeling      On mat table, min A to attain position  Sitting back on heels and up to tall kneel with chair in front x 15 reps    Tall kneel hold with alternating UE raises x 15 each            Ther Ex         Sit to stand         nustep         treadmill                                                          Ther Activity         Stand pivot transfer         Bed mobility  Transfer from wheelchair to/from mat with FWW and CS, sit to supine min A at L LE, rolling with min A, supine to sit with min-mod A      Rolling R/L x 10 each with push through LE, supine to sit x 10 reps, supine with push up through L UE x 10 reps    Bridging x 10 reps    Scooting x 5 reps each side    Supine to sit x 5 reps  Sit to supine x 10 each direction, cues for increased amplitude of movement  Sit to supine 30" intervals with 30" rest breaks x 3 reps, cues for increased amplitude of movement   Gait Training         Gait with SPC Hallway with SPC on R side, 100 feet with hand over hand and CGA, 2nd trial of 200 feet    3# AW on L LE   SOLO  SPC on R, 1/4 lap x 3 laps, 2 laps with 3# AW on L SOLO  SPC on R 1/4 lap x 3 laps With FWW in hallway 1 trial of 500 feet with FWW and SPV, fast walking 6MWT 454 feet completed with FWW    TUG with FWW with CS    Gait speed  500 feet total, fast walking with ' fast walking with SPV    Gait training on TM  SOLO  TM B/L UE 1 4mph x 5 minutes  Seated rest break  1 6 mph for 2 minutes SOLO  TM B/L UE   1 4-1  6mph for 3 minutes  Seated rest break    1  6mph for 3 minutes    Step up/down onto TM with CGA/min A and cues for sequencing   SOLO  TM with B/L UE support  1 4mph for 4 minutes    Seated rest break    1  4mph for 4 minutes   stairs         Modalities           Progress with therapy, bed mobility education, outcome measures and progress                           Access Code: [email or web address]  URL: [email or web address]  Date: 10/13/2022  Prepared by: Vicki Blackman    Exercises  · Seated Hamstring Stretch with Strap - 1 x daily - 7 x weekly - 3 sets - 30 hold  · Seated Gastroc Stretch with Strap - 1 x daily - 7 x weekly - 3 sets - 30 hold  · Seated Long Arc Quad - 1 x daily - 5 x weekly - 3 sets - 10 reps  · Seated March - 1 x daily - 5 x weekly - 3 sets - 10 reps  · Seated Heel Raise - 1 x daily - 5 x weekly - 3 sets - 10 reps  · Seated Hip Adduction Isometrics with Ball - 1 x daily - 5 x weekly - 3 sets - 10 reps - 5 hold  · Seated Hip Abduction - 1 x daily - 5 x weekly - 3 sets - 10 reps

## 2022-11-18 ENCOUNTER — HOSPITAL ENCOUNTER (OUTPATIENT)
Dept: RADIOLOGY | Facility: HOSPITAL | Age: 82
Discharge: HOME | End: 2022-11-18

## 2022-11-18 DIAGNOSIS — I63.30 CEREBRAL THROMBOSIS WITH CEREBRAL INFARCTION (HCC): ICD-10-CM

## 2022-11-18 RX ADMIN — IOHEXOL 90 ML: 350 INJECTION, SOLUTION INTRAVENOUS at 13:23

## 2022-11-21 ENCOUNTER — APPOINTMENT (OUTPATIENT)
Dept: PHYSICAL THERAPY | Facility: CLINIC | Age: 82
End: 2022-11-21

## 2022-11-22 ENCOUNTER — OFFICE VISIT (OUTPATIENT)
Dept: PHYSICAL THERAPY | Facility: CLINIC | Age: 82
End: 2022-11-22

## 2022-11-22 ENCOUNTER — OFFICE VISIT (OUTPATIENT)
Dept: OCCUPATIONAL THERAPY | Facility: CLINIC | Age: 82
End: 2022-11-22

## 2022-11-22 DIAGNOSIS — Z86.73 HISTORY OF CVA (CEREBROVASCULAR ACCIDENT): Primary | ICD-10-CM

## 2022-11-22 DIAGNOSIS — Z74.09 IMPAIRED FUNCTIONAL MOBILITY, BALANCE, GAIT, AND ENDURANCE: ICD-10-CM

## 2022-11-22 NOTE — PROGRESS NOTES
Occupational Therapy Daily Note:    Today's date: 2022  Patient name: Hilton Kuo  : 1940  MRN: [id number]  Referring provider: Christy Barboza PA-C  Dx:   Encounter Diagnosis   Name Primary? • History of CVA (cerebrovascular accident) Yes          Subjective: I used to write cursive before the stroke    Objective: Pt engaged in skilled OT treatment session with focus on UE strengthening, UE endurance, FMC/GMC, FMS/GMS and body awareness to increase engagement, endurance, tolerance, and independence with daily ADL and IADL tasks  CPT Code Minutes                                           Task Details        Therapeutic Activity 45 Pt engaged in cursive handwriting at table top copying state list from slant board in front of patient  Therapist providing written example to trace and then trial own next to it  Focus on FMC/FMS, letter size, line adherence, spacing, and endurance  Therapist providing verbal and visual cueing in addition to providing built up pencil   Therapist noting upon switching tan built up  to tripod , increased legibility and letter spacing  Therapist providing pt with additional sheet and tripod gripper for home use and practice  Neuro Re-Ed               Therapeutic Exercise 10 Pt tolerated 5 min x 2 prograde/retrograde motions at no resist sitting on UBE with focus on increasing proximal UE strength,  endurance, act tolerance, cardiopulmonary endurance, sustained grasp, and ROM, to inc indep and safety with ADL/IADL fxn and fxnl reaching tasks  Manual          Self Care           Assessment: Tolerated treatment well  Pt demo increased legibility and spacing of writing when switching from built up gripper to tripod   Pt agreeable to take home writing practice, and states she would like to work on signing her name and filling out checks   Pt demo good tolerance for UBE this date with varried speeds throughout as well as therapist setting timer for time accuracy  Patient would benefit from continued skilled OT           Plan: Continued skilled OT per POC    INTERVENTION COMMENTS:  Diagnosis: History of CVA (cerebrovascular accident) [Z86 73]  Precautions: Fall risk, pacemaker, No E-STIM   FOTO: Completed  Insurance: Payor: [address] / Plan: Shruthi Clark / Product Type: Medicare HMO /   8 of 16 visits, PN due 12/14/2022  POC ends 01/02/2023

## 2022-11-22 NOTE — PROGRESS NOTES
Daily Note     Today's date: 2022  Patient name: Ramon Tidwell  : 1940  MRN: [id number]  Referring provider: Annette Wright PA-C  Dx:   Encounter Diagnosis     ICD-10-CM    1  History of CVA (cerebrovascular accident)  Z86 73       2  Impaired functional mobility, balance, gait, and endurance  Z74 09           Start Time: 0900  Stop Time: 1000  Total time in clinic (min): 60 minutes    Subjective: Patient reports that her legs are sore and feel heavy  Objective: See treatment diary below      Assessment: Tolerated treatment well  Eric Brown participated in skilled PT session focused on balance, gait, and endurance  Patient demonstrates some difficulty with L hip flexion weakness and tightness  Patient present with forward flexion posture with exercises, v c  to correct  Improved ambulation on TM with increased B/L step length, but continues with decreased L heel strike  Patient would continue to benefit from skilled PT interventions to address deficits with balance, gait, and endurance  Patient demonstrated fatigue post treatment      Plan: Continue per plan of care  Precautions: HTN, a-fib, pacemaker      Manuals                                        Neuro Re-Ed         Standing marching         sidesteps SOLO w/SPC on R 1/4 length x 4 laps  // bars (short) 3# AW on L LE, x 4 laps SOLO  With SPC on R, 1/4 lap x 3 laps (no weights) SOLO  With SPC on R, 1/4 lap x 3 laps and 3# AW on L LE SOLO  With SPC on R, 1/4 lap x 3 laps   HKM SOLO w/SPC on R 1/4 length x 4 laps  No LOB  // bars (short) x 5 laps with 3# AW on L SOLO   With SPC on R, 1/4 lap x 3 laps, (no weights) SOLO  With SPC on R, 1/4 lap x 3 laps, 3# AW on L SOLO  With SPC on R, 1/4 lap x 3 laps   1 LOB requiring SOLO   Sit to stand with LE elevated STS No UE support w/CGA to C/S  X 10  STS x 10 reps, cues for foot positioning and hand positioning STS with UE's on knees and min A at trunk for anterior trunk lean, x 8 reps STS with hands on knees and CGA/CS for anterior trunk lean 2 x 10 STS no UE support and CGA/min A x 10 reps    With R UE elevated on 4-inch step x 5 reps with single UE use STS no UE support with CGA x 10    X 10 with PB pulling behind     X 5 post tall kneel   Step taps SOLO w/SPC  On R 6" step   Taps x 10 R  X 10 L        backwards SOLO w/SPC on R No AW  1/4 length x 2 laps  // bars (short) x 4 laps with 3# AW on L SOLO  1/4 lap with SPC on R, 3# AW on L, x 3 laps SOLO  1/4 lap with SPC and 3# AW on R LE, x 3 laps    Step over jim SOLO w/SPC on   R 6" hurdles (4)  Fwd x 3 laps  // bars  With 6-inch hurdles (4) with 3# AW on L LE, 3 laps forward, lateral x 2 laps (min A at L LE to clear jim)      Step up SOLO w/SPC on R 6" step  X 10 R  X 10 L    No AW    C/S to CGA with LLE lead    6-inch with SPC on R, x 10 leading with R, down with R    Up with L, down with L x 10    3# AW on L LE    Tall kneeling      On mat table, min A to attain position  Sitting back on heels and up to tall kneel with chair in front x 15 reps    Tall kneel hold with alternating UE raises x 15 each            Ther Ex         Sit to stand         nustep         treadmill                                                          Ther Activity         Stand pivot transfer         Bed mobility  Transfer from wheelchair to/from mat with FWW and CS, sit to supine min A at L LE, rolling with min A, supine to sit with min-mod A      Rolling R/L x 10 each with push through LE, supine to sit x 10 reps, supine with push up through L UE x 10 reps    Bridging x 10 reps    Scooting x 5 reps each side    Supine to sit x 5 reps  Sit to supine x 10 each direction, cues for increased amplitude of movement  Sit to supine 30" intervals with 30" rest breaks x 3 reps, cues for increased amplitude of movement   Gait Training         Gait with SPC    SOLO  SPC on R, 1/4 lap x 3 laps, 2 laps with 3# AW on L SOLO  SPC on R 1/4 lap x 3 laps    With FWW in hallway  6MWT 454 feet completed with FWW    TUG with FWW with CS    Gait speed  500 feet total, fast walking with ' fast walking with SPV    Gait training on TM SOLO TM w/B/L UE support    1 4 mph x 5 min    Seated rest break    1 4 mph x 5 min    Total 10 min SOLO  TM B/L UE 1 4mph x 5 minutes  Seated rest break  1 6 mph for 2 minutes SOLO  TM B/L UE   1 4-1  6mph for 3 minutes  Seated rest break    1  6mph for 3 minutes    Step up/down onto TM with CGA/min A and cues for sequencing   SOLO  TM with B/L UE support  1 4mph for 4 minutes    Seated rest break    1  4mph for 4 minutes   stairs         Modalities           Progress with therapy, bed mobility education, outcome measures and progress                           Access Code: [email or web address]  URL: [email or web address]  Date: 10/13/2022  Prepared by: Janice Atkins    Exercises  · Seated Hamstring Stretch with Strap - 1 x daily - 7 x weekly - 3 sets - 30 hold  · Seated Gastroc Stretch with Strap - 1 x daily - 7 x weekly - 3 sets - 30 hold  · Seated Long Arc Quad - 1 x daily - 5 x weekly - 3 sets - 10 reps  · Seated March - 1 x daily - 5 x weekly - 3 sets - 10 reps  · Seated Heel Raise - 1 x daily - 5 x weekly - 3 sets - 10 reps  · Seated Hip Adduction Isometrics with Ball - 1 x daily - 5 x weekly - 3 sets - 10 reps - 5 hold  · Seated Hip Abduction - 1 x daily - 5 x weekly - 3 sets - 10 reps

## 2022-11-23 ENCOUNTER — APPOINTMENT (OUTPATIENT)
Dept: PHYSICAL THERAPY | Facility: CLINIC | Age: 82
End: 2022-11-23

## 2022-11-23 ENCOUNTER — OFFICE VISIT (OUTPATIENT)
Dept: INTERNAL MEDICINE CLINIC | Facility: CLINIC | Age: 82
End: 2022-11-23

## 2022-11-23 VITALS
WEIGHT: 142 LBS | BODY MASS INDEX: 25.16 KG/M2 | TEMPERATURE: 98.1 F | HEART RATE: 69 BPM | SYSTOLIC BLOOD PRESSURE: 166 MMHG | HEIGHT: 63 IN | DIASTOLIC BLOOD PRESSURE: 53 MMHG

## 2022-11-23 DIAGNOSIS — I27.20 PULMONARY HYPERTENSION (HCC): ICD-10-CM

## 2022-11-23 DIAGNOSIS — I10 ESSENTIAL HYPERTENSION: Primary | ICD-10-CM

## 2022-11-23 DIAGNOSIS — I48.20 CHRONIC ATRIAL FIBRILLATION (HCC): ICD-10-CM

## 2022-11-23 RX ORDER — FUROSEMIDE 20 MG/1
40 TABLET ORAL DAILY
Qty: 90 TABLET | Refills: 3 | Status: SHIPPED | OUTPATIENT
Start: 2022-11-23

## 2022-11-23 NOTE — ASSESSMENT & PLAN NOTE
BP at home range in 130's/70's  At the office today BP was 150/70  · Will continue current regimen and patient instructed to have PT/OT measure BP before therapy sessions

## 2022-11-23 NOTE — PROGRESS NOTES
[facility]  INTERNAL MEDICINE OFFICE VISIT     PATIENT INFORMATION     Kristin Pena   80 y o  female   MRN: [id number]    ASSESSMENT/PLAN     Problem List Items Addressed This Visit        Cardiovascular and Mediastinum    Essential hypertension - Primary     BP at home range in 130's/70's  At the office today BP was 150/70  · Will continue current regimen and patient instructed to have PT/OT measure BP before therapy sessions         Relevant Medications    furosemide (LASIX) 20 mg tablet    Chronic atrial fibrillation ([facility] [address] )     Patient has permanent Afib  · Currently sees cardiology  · Pradaxa 150 BID         Pulmonary hypertension ([facility] [address] )     Patient presents with Left sided leg swelling and SOB on exertion at times  Last Echo in August 2022 showed elevated pulmonary pressure  · RAP measured to be ~9  · Increased Lasix from 20 to 40 daily  · Currently taking Potassium 20 daily  · PFT's ordered  · Home sleep study ordered   · BNP ordered  · Cardiac vs  pulm rule out         Relevant Medications    furosemide (LASIX) 20 mg tablet    Other Relevant Orders    Complete PFT with post bronchodilator    Home Study    NT-BNP PRO     Schedule a follow-up appointment in 3 months      HEALTH MAINTENANCE     Immunization History   Administered Date(s) Administered   • COVID-19 MODERNA VACC 0 5 ML IM 02/19/2021, 05/27/2021   • COVID-19 Pfizer Vac BIVALENT Jermaine-sucrose 12 Yr+ IM (BOOSTER ONLY) 09/09/2022   • INFLUENZA 10/11/2005, 10/24/2006, 10/15/2007, 09/28/2015, 10/06/2016, 10/18/2017   • Influenza Quadrivalent Preservative Free 3 years and older IM 10/06/2016   • Influenza Quadrivalent, 6-35 Months IM 10/18/2017   • Influenza, high dose seasonal 0 7 mL 10/12/2018, 09/23/2019, 09/23/2020, 09/22/2021, 10/05/2022   • Influenza, seasonal, injectable 09/24/2013, 09/24/2014, 09/28/2015   • Pneumococcal Conjugate 13-Valent 05/31/2016   • Pneumococcal Polysaccharide PPV23 01/01/2001, 12/18/2019   • Tdap 03/12/2014     CHIEF COMPLAINT     Chief Complaint   Patient presents with   • Follow-up     Bilateral leg pain        HISTORY OF PRESENT ILLNESS     Patient is a 79 y/o PMH of CVA, permanent Afib, HTN, and GERD who presents for 6 week follow up  Patient states her GERD has significantly improved since starting the protonix  Patient does state her left leg is still swelling since the CVA  Patient saw cardiology and they placed her back on Lasix 20, which patient states does help  Patient also notes some SOB on exertion  Patient receives PT/OT 2 hours a day everyday and follows with her appointments  Patient has no other complaints  REVIEW OF SYSTEMS     Review of Systems   Constitutional: Negative for chills and fever  HENT: Negative for ear pain and sore throat  Eyes: Negative for pain and visual disturbance  Respiratory: Positive for shortness of breath  Negative for cough and chest tightness  Cardiovascular: Positive for leg swelling (LLE)  Negative for chest pain and palpitations  Gastrointestinal: Negative for abdominal pain and vomiting  Genitourinary: Negative for dysuria and hematuria  Musculoskeletal: Negative for arthralgias and back pain  Skin: Negative for color change and rash  Neurological: Positive for weakness (LLE)  Negative for seizures and syncope  All other systems reviewed and are negative  OBJECTIVE     Vitals:    11/23/22 0945 11/23/22 0950   BP: 156/79 166/53   BP Location: Right arm Right arm   Patient Position: Sitting Sitting   Cuff Size: Adult Adult   Pulse: 87 69   Temp: 98 1 °F (36 7 °C)    TempSrc: Temporal    Weight: 64 4 kg (142 lb)    Height: 5' 3" (1 6 m)      Physical Exam  Vitals and nursing note reviewed  Constitutional:       General: She is not in acute distress  Appearance: She is well-developed  HENT:      Head: Normocephalic and atraumatic     Eyes:      Conjunctiva/sclera: Conjunctivae normal    Cardiovascular:      Rate and Rhythm: Normal rate  Rhythm irregular  Heart sounds: No murmur heard  Pulmonary:      Effort: Pulmonary effort is normal  No respiratory distress  Breath sounds: Normal breath sounds  Abdominal:      Palpations: Abdomen is soft  Tenderness: There is no abdominal tenderness  Musculoskeletal:      Cervical back: Neck supple  Left lower leg: Edema (1+) present  Skin:     General: Skin is warm and dry  Capillary Refill: Capillary refill takes less than 2 seconds  Neurological:      Mental Status: She is alert and oriented to person, place, and time        Comments: LLE 4/5-improved since last visit-residual effects of CVA   Psychiatric:         Mood and Affect: Mood normal        CURRENT MEDICATIONS     Current Outpatient Medications:   •  acetaminophen (TYLENOL) 325 mg tablet, Take 2 tablets (650 mg total) by mouth every 6 (six) hours as needed for mild pain, Disp: , Rfl: 0  •  Blood Pressure Monitoring (Blood Pressure Cuff) MISC, Use every other day For HTN, Disp: 1 each, Rfl: 0  •  dabigatran etexilate (Pradaxa) 150 mg capsu, Take 1 capsule (150 mg total) by mouth 2 (two) times a day, Disp: 60 capsule, Rfl: 2  •  furosemide (LASIX) 20 mg tablet, Take 2 tablets (40 mg total) by mouth daily, Disp: 90 tablet, Rfl: 3  •  guaiFENesin (MUCINEX) 600 mg 12 hr tablet, Take 1 tablet (600 mg total) by mouth every 12 (twelve) hours, Disp: , Rfl: 0  •  Incontinence Supply Disposable (RA Adult Wipes) MISC, Use 4 (four) times a day, Disp: 64 each, Rfl: 10  •  losartan (COZAAR) 50 mg tablet, Take 1 tablet (50 mg total) by mouth daily, Disp: 30 tablet, Rfl: 2  •  magnesium oxide (MAG-OX) 400 mg, TAKE 1 TABLET (400 MG TOTAL) BY MOUTH DAILY, Disp: 30 tablet, Rfl: 2  •  nebivolol (BYSTOLIC) 5 mg tablet, Take 1 tablet (5 mg total) by mouth daily, Disp: 90 tablet, Rfl: 3  •  nitroglycerin (NITROSTAT) 0 4 mg SL tablet, Place 1 tablet (0 4 mg total) under the tongue every 5 (five) minutes as needed for chest pain, Disp: , Rfl: 0  •  pantoprazole (PROTONIX) 40 mg tablet, Take 1 tablet (40 mg total) by mouth daily in the early morning, Disp: 30 tablet, Rfl: 2  •  potassium chloride (K-DUR,KLOR-CON) 10 mEq tablet, Take 2 tablets (20 mEq total) by mouth daily, Disp: 180 tablet, Rfl: 3  •  rosuvastatin (CRESTOR) 10 MG tablet, Take 0 5 tablets (5 mg total) by mouth daily, Disp: 15 tablet, Rfl: 2  •  verapamil (CALAN-SR) 240 mg CR tablet, Take 1 tablet (240 mg total) by mouth daily at bedtime, Disp: 90 tablet, Rfl: 3  •  benzonatate (TESSALON PERLES) 100 mg capsule, Take 2 capsules (200 mg total) by mouth 3 (three) times a day as needed for cough (Patient not taking: Reported on 10/3/2022), Disp: , Rfl:   •  bisacodyl (DULCOLAX) 10 mg suppository, Insert 1 suppository (10 mg total) into the rectum daily as needed (Constipation   Second line and refer to bowel protocol) (Patient not taking: Reported on 10/3/2022), Disp: 12 suppository, Rfl: 0  •  dabigatran etexilate (PRADAXA) 150 mg capsu, Take 1 capsule (150 mg total) by mouth 2 (two) times a day PRICE CHECK DO NOT FILL (Patient not taking: Reported on 10/5/2022), Disp: 60 capsule, Rfl: 0  •  melatonin 3 mg, Take 2 tablets (6 mg total) by mouth every evening (Patient not taking: Reported on 10/3/2022), Disp: , Rfl: 0  •  menthol-methyl salicylate (BENGAY) 96-49 % cream, Apply topically 2 (two) times a day (Patient not taking: Reported on 10/3/2022), Disp: , Rfl: 0  •  phenol (CHLORASEPTIC) 1 4 % mucosal liquid, Apply 1 spray to the mouth or throat every 2 (two) hours as needed (cough) (Patient not taking: Reported on 10/3/2022), Disp: , Rfl: 0  •  polyethylene glycol (MIRALAX) 17 g packet, Take 17 g by mouth daily as needed (First line and refer to bowel protocol) (Patient not taking: Reported on 10/3/2022), Disp: , Rfl: 0  •  polyvinyl alcohol (LIQUIFILM TEARS) 1 4 % ophthalmic solution, Administer 1 drop to the right eye as needed for dry eyes (Patient not taking: Reported on 10/3/2022), Disp: 15 mL, Rfl: 0  •  simethicone (MYLICON,GAS-X) 625 MG CAPS, Take 1 capsule (125 mg total) by mouth 3 (three) times a day before meals (Patient not taking: Reported on 11/23/2022), Disp: 28 capsule, Rfl: 0  •  tamsulosin (FLOMAX) 0 4 mg, Take 1 capsule (0 4 mg total) by mouth daily with dinner, Disp: 30 capsule, Rfl: 2    Current Facility-Administered Medications:   •  cyanocobalamin injection 1,000 mcg, 1,000 mcg, Intramuscular, Q30 Days, Michaela Montelongo MD, 1,000 mcg at 11/07/22 1006    PAST MEDICAL & SURGICAL HISTORY     Past Medical History:   Diagnosis Date   • A-fib ([facility] [address] )    • Anemia    • Arthritis    • Breast pain, right    • Chest pain on breathing    • Colon polyp    • Cough    • Diverticulosis    • Encounter for screening colonoscopy    • H/O sick sinus syndrome    • Heart murmur    • High cholesterol    • History of atrial fibrillation     Rate ontrolled  On xarelto 15mg (creatinine 50) Followed by Dr Luz Worthington with Cardiology    • History of weight loss     Report loose fitting clothes  Weight stable (3lbs difference)  Last colo showed small tubular adenoma x2  Breast biopsy last showed fibrocystic changes  TSH wnl  Will check cbc, bmp, spep  • Hx of long term use of blood thinners    • Hypertension    • Irregular heart beat    • Pacemaker    • Preop examination    • Shortness of breath    • Viral bronchitis      Past Surgical History:   Procedure Laterality Date   • BREAST BIOPSY Right 08/17/2006    ultrasound guided Percutaneous Needle Core -Benign   • CATARACT EXTRACTION     • CATARACT EXTRACTION W/ INTRAOCULAR LENS  IMPLANT, BILATERAL     • COLONOSCOPY     • COLONOSCOPY N/A 5/22/2018    Procedure: COLONOSCOPY;  Surgeon: Vu Mathews DO;  [facility];   Service: Gastroenterology   • INSERT / [place] / REMOVE PACEMAKER     • LEG SURGERY Right     as a child after a fall   • MAMMO STEREOTACTIC BREAST BIOPSY RIGHT (ALL INC) Right 10/2014    benign   • VA CMBND ANTERPOST COLPORRAPHY W/CYSTO N/A 3/17/2016    Procedure: COLPORRHAPHY ANTERIOR POSTERIOR ;  Surgeon: Ramirez Webb MD;  [facility];  Service: Gynecology   • WA COLONOSCOPY FLX DX W/COLLJ SPEC WHEN PFRMD N/A 4/4/2019    Procedure: COLONOSCOPY;  Surgeon: Tod Hatfield DO;  [facility]; Service: Gastroenterology   • WA CYSTOURETHROSCOPY N/A 3/17/2016    Procedure: CYSTOSCOPY;  Surgeon: Ramirez Webb MD;  [facility];  Service: Gynecology   • WA ESOPHAGOGASTRODUODENOSCOPY TRANSORAL DIAGNOSTIC N/A 4/16/2018    Procedure: EGD AND COLONOSCOPY;  Surgeon: Tod Hatfield DO;  [facility]; Service: Gastroenterology   • WA LAP,SURG,COLECTOMY, PARTIAL, W/ANAST Right 1/13/2022    Procedure: Diagnostic laparoscopy, laparscopic right colectomy;  Surgeon: Ricky Hayes MD;  [facility]: [facility];  Service: Colorectal   • WA REVAGINAL PROLAPSE,SACROSP LIG N/A 3/17/2016    Procedure: COLPOPEXY VAGINAL EXTRAPERITONEAL (VEC) ANTERIOR ;  Surgeon: Ramirez Webb MD;  [facility];  Service: Gynecology   • WA SLING OPER STRES INCONTINENCE N/A 3/17/2016    Procedure: INSERTION PUBOVAGINAL SLING SINGLE INCISION ;  Surgeon: Ramirez Webb MD;  [facility];  Service: Gynecology     SOCIAL & FAMILY HISTORY     Social History     Socioeconomic History   • Marital status:       Spouse name: Not on file   • Number of children: Not on file   • Years of education: Not on file   • Highest education level: Not on file   Occupational History   • Occupation: retired   Tobacco Use   • Smoking status: Never   • Smokeless tobacco: Never   Vaping Use   • Vaping Use: Never used   Substance and Sexual Activity   • Alcohol use: Never   • Drug use: No   • Sexual activity: Not Currently     Comment:    Other Topics Concern   • Not on file   Social History Narrative    Housing, household, and economic circumstances      Social Determinants of Health     Financial Resource Strain: Low Risk    • Difficulty of Paying Living Expenses: Not hard at all   Food Insecurity: No Food Insecurity   • Worried About Running Out of Food in the Last Year: Never true   • Ran Out of Food in the Last Year: Never true   Transportation Needs: No Transportation Needs   • Lack of Transportation (Medical): No   • Lack of Transportation (Non-Medical): No   Physical Activity: Not on file   Stress: Not on file   Social Connections: Not on file   Intimate Partner Violence: Not on file   Housing Stability: Low Risk    • Unable to Pay for Housing in the Last Year: No   • Number of Places Lived in the Last Year: 1   • Unstable Housing in the Last Year: No     Social History     Substance and Sexual Activity   Alcohol Use Never       Social History     Substance and Sexual Activity   Drug Use No     Social History     Tobacco Use   Smoking Status Never   Smokeless Tobacco Never     Family History   Problem Relation Age of Onset   • Diabetes Mother    • Breast cancer Sister 48   • No Known Problems Father    • No Known Problems Maternal Grandmother    • No Known Problems Maternal Grandfather    • No Known Problems Paternal Grandmother    • No Known Problems Paternal Grandfather    • No Known Problems Daughter    • No Known Problems Son    • No Known Problems Sister    • No Known Problems Sister    • No Known Problems Brother    • No Known Problems Brother    • No Known Problems Sister    • No Known Problems Paternal Aunt    • No Known Problems Paternal Aunt    • No Known Problems Paternal Aunt    • No Known Problems Paternal Aunt      ==  Gurinder Koo MD  PGY-1  [facility] [address]  [address] [facility] , [address] [address] [facility] [address], [address] [facility]  Office: [phone]  Fax: [phone]

## 2022-11-23 NOTE — ASSESSMENT & PLAN NOTE
Patient presents with Left sided leg swelling and SOB on exertion at times  Last Echo in August 2022 showed elevated pulmonary pressure    · RAP measured to be ~9  · Increased Lasix from 20 to 40 daily  · Currently taking Potassium 20 daily  · PFT's ordered  · Home sleep study ordered   · BNP ordered  · Cardiac vs  pulm rule out

## 2022-11-30 ENCOUNTER — OFFICE VISIT (OUTPATIENT)
Dept: OCCUPATIONAL THERAPY | Facility: CLINIC | Age: 82
End: 2022-11-30

## 2022-11-30 ENCOUNTER — OFFICE VISIT (OUTPATIENT)
Dept: PHYSICAL THERAPY | Facility: CLINIC | Age: 82
End: 2022-11-30

## 2022-11-30 DIAGNOSIS — Z86.73 HISTORY OF CVA (CEREBROVASCULAR ACCIDENT): Primary | ICD-10-CM

## 2022-11-30 DIAGNOSIS — Z74.09 IMPAIRED FUNCTIONAL MOBILITY, BALANCE, GAIT, AND ENDURANCE: ICD-10-CM

## 2022-11-30 DIAGNOSIS — Z74.1 NEED FOR ASSISTANCE WITH PERSONAL CARE: ICD-10-CM

## 2022-11-30 NOTE — PROGRESS NOTES
Occupational Therapy Daily Note:    Today's date: 2022  Patient name: Belkys Nicholson  : 1940  MRN: [id number]  Referring provider: Davida Prado PA-C  Dx:   Encounter Diagnoses   Name Primary? • History of CVA (cerebrovascular accident) Yes   • Need for assistance with personal care           Subjective: Pts son reports improvements in UE strength/ADLs  Continuing to req assist for LB ADL's  Objective: Pt engaged in skilled OT treatment session with focus on UE strengthening, UE endurance, FMC/GMC, FMS/GMS and body awareness to increase engagement, endurance, tolerance, and independence with daily ADL and IADL tasks  CPT Code Minutes                                           Task Details        Therapeutic Activity  Pt in stance completed target taps with 2# wrist weight, associated to a color called out using Top Rops Louie associated 2 colors with L hand and 2 colors with R hand, then upgraded to using L hand only and tapping each color with all digits of fingers for digit isolation D1-D5 while in stance to improve reaching/target accuracy  Neuro Re-Ed               Therapeutic Exercise  Pt tolerated 2 min x 5 for total of 10 min in prograde/retrograde motions at 1 2 resist sitting on UBE with focus on increasing proximal UE strength, cardiopulmonary endurance, act tolerance, sustained grasp to inc indep and safety with ADL/IADL fxn and fxnl reaching tasks  Pt concluded with education on green resistance band exercises of UE  Pt educated on shoulder flexion raises, elbow curls, chest fly x10 reps  Access Code: [email or web address]  URL: [email or web address]  Date: 2022  Prepared by: Hedy Faye    Exercises  • Seated Shoulder Flexion with Resistance - 1 x daily - 3 x weekly - 1 sets - 10 reps  • Seated Elbow Flexion with Resistance - 1 x daily - 3 x weekly - 1 sets - 10 reps  • Seated Shoulder Horizontal Abduction with Resistance - 1 x daily - 3 x weekly - 1 sets - 10 reps            Manual          Self Care  Pt engaged in simulation of donning/doffing pants using green resistance theraband while seated on multiple trials  Pt then educated on use of a reacher to improve ability to don pants over LLE first with increased success  Provided handout of how to use reacher for donning pants - provide resources for obtaining reacher next session  Assessment: Tolerated treatment well  Patient would benefit from continued skilled OT  Pt able to maintain 1 2 resist with OT continuously changing resist once motion changed from either prograde or retrograde only  Pt demo MAX difficulty with simulating of donning pants to LLE 2* tone and decreased AROM of dorsiflexion in ankle  Pt improved with use of AE and is interested in purchasing a reacher to increase her independence with ADLs  Pt demo with some difficulty in understanding of target taps activity although with repetition improved, possibly due to language barrier  Pt demo F understanding of HEP provided, continue to review next session for accuracy  Plan: Continued skilled OT per POC  Review HEP again with pt for thorough understanding  *    INTERVENTION COMMENTS:  Diagnosis: History of CVA (cerebrovascular accident) [Z86 73]  Precautions: Fall risk, pacemaker, No E-STIM   FOTO: Completed  Insurance: Payor: [address] / Plan: Sarahi Brar / Product Type: Medicare HMO /   11 of 16 visits, PN due 12/14/2022  POC ends 01/02/2023

## 2022-11-30 NOTE — PROGRESS NOTES
Daily Note     Today's date: 2022  Patient name: Braeden Chadwick  : 1940  MRN: [id number]  Referring provider: Maximino Bonds PA-C  Dx:   Encounter Diagnosis     ICD-10-CM    1  History of CVA (cerebrovascular accident)  Z86 73       2  Impaired functional mobility, balance, gait, and endurance  Z74 09           Start Time: 1000  Stop Time: 1100  Total time in clinic (min): 60 minutes    Subjective: Patient reports no new complaints  Objective: See treatment diary below      Assessment: Tolerated treatment well  Jase Puentes participated in skilled PT session focused on balance, gait, and endurance  Patient continues with decreased L heel strike during ambulation with decreased wt bearing on LLE  Trial of no AD with use of SOLO Step demonstrating improved dynamic balance with LOB  Patient would continue to benefit from skilled PT interventions to address deficits with balance, gait, and endurance  Patient demonstrated fatigue post treatment      Plan: Continue per plan of care  Precautions: HTN, a-fib, pacemaker      Manuals                                        Neuro Re-Ed         Standing marching         sidesteps SOLO w/SPC on R 1/4 length x 4 laps SOLO No AW No AD 1/4 length x 3 laps C/S // bars (short) 3# AW on L LE, x 4 laps SOLO  With SPC on R, 1/4 lap x 3 laps (no weights) SOLO  With SPC on R, 1/4 lap x 3 laps and 3# AW on L LE SOLO  With SPC on R, 1/4 lap x 3 laps   HKM SOLO w/SPC on R 1/4 length x 4 laps  No LOB SOLO No AW No AD  1/4 length x 3 laps C/S // bars (short) x 5 laps with 3# AW on L SOLO   With SPC on R, 1/4 lap x 3 laps, (no weights) SOLO  With SPC on R, 1/4 lap x 3 laps, 3# AW on L SOLO  With SPC on R, 1/4 lap x 3 laps   1 LOB requiring SOLO   Sit to stand with LE elevated STS No UE support w/CGA to C/S  X 10  STS w/B/L UE  X 10 v c  for positioning     STS with UE's on knees and min A at trunk for anterior trunk lean, x 8 reps STS with hands on knees and CGA/CS for anterior trunk lean 2 x 10 STS no UE support and CGA/min A x 10 reps    With R UE elevated on 4-inch step x 5 reps with single UE use STS no UE support with CGA x 10    X 10 with PB pulling behind     X 5 post tall kneel   Step taps SOLO w/SPC  On R 6" step   Taps x 10 R  X 10 L        backwards SOLO w/SPC on R No AW  1/4 length x 2 laps SOLO No AW No AD  1/4 length x 3 laps decreased LLE step length // bars (short) x 4 laps with 3# AW on L SOLO  1/4 lap with SPC on R, 3# AW on L, x 3 laps SOLO  1/4 lap with SPC and 3# AW on R LE, x 3 laps    Step over jim SOLO w/SPC on   R 6" hurdles (4)  Fwd x 3 laps  // bars  With 6-inch hurdles (4) with 3# AW on L LE, 3 laps forward, lateral x 2 laps (min A at L LE to clear jim)      Step up SOLO w/SPC on R 6" step  X 10 R  X 10 L    No AW    C/S to CGA with LLE lead    6-inch with SPC on R, x 10 leading with R, down with R    Up with L, down with L x 10    3# AW on L LE    Tall kneeling      On mat table, min A to attain position   Sitting back on heels and up to tall kneel with chair in front x 15 reps    Tall kneel hold with alternating UE raises x 15 each            Ther Ex         Sit to stand         nustep         treadmill           Reviewed and performed additions to HEP  Seated Toe Raises x 10 AAROM  Heel slides x 10  Side stepping x 2 laps  Standing marches x 10 ea                                               Ther Activity         Stand pivot transfer         Bed mobility    Sit to supine x 10 each direction, cues for increased amplitude of movement  Sit to supine 30" intervals with 30" rest breaks x 3 reps, cues for increased amplitude of movement   Gait Training         Gait with SPC    SOLO  SPC on R, 1/4 lap x 3 laps, 2 laps with 3# AW on L SOLO  SPC on R 1/4 lap x 3 laps    With FWW in hallway    500 feet total, fast walking with ' fast walking with SPV    Gait training on TM SOLO TM w/B/L UE support    1 4 mph x 5 min    Seated rest break    1 4 mph x 5 min    Total 10 min SOLO TM B/L UE support    1 4 mph x 5 min  1 5 mph x 1 min    Seated rest break    1 5 mph x 5 min    Total 11 min SOLO  TM B/L UE   1 4-1  6mph for 3 minutes  Seated rest break    1  6mph for 3 minutes    Step up/down onto TM with CGA/min A and cues for sequencing   SOLO  TM with B/L UE support  1 4mph for 4 minutes    Seated rest break    1  4mph for 4 minutes   stairs         Modalities                                      Access Code: [email or web address]  URL: [email or web address]  Date: 10/13/2022  Prepared by: Jack Sick    Exercises  · Seated Hamstring Stretch with Strap - 1 x daily - 7 x weekly - 3 sets - 30 hold  · Seated Gastroc Stretch with Strap - 1 x daily - 7 x weekly - 3 sets - 30 hold  · Seated Long Arc Quad - 1 x daily - 5 x weekly - 3 sets - 10 reps  · Seated March - 1 x daily - 5 x weekly - 3 sets - 10 reps  · Seated Heel Raise - 1 x daily - 5 x weekly - 3 sets - 10 reps  · Seated Hip Adduction Isometrics with Ball - 1 x daily - 5 x weekly - 3 sets - 10 reps - 5 hold  · Seated Hip Abduction - 1 x daily - 5 x weekly - 3 sets - 10 reps  Exercises   • Seated Hamstring Stretch with Strap - 1 x daily - 7 x weekly - 3 sets - 30 hold  • Seated Gastroc Stretch with Strap - 1 x daily - 7 x weekly - 3 sets - 30 hold  • Seated Long Arc Quad - 1 x daily - 5 x weekly - 3 sets - 10 reps  • Seated March - 1 x daily - 5 x weekly - 3 sets - 10 reps  • Seated Heel Raise - 1 x daily - 5 x weekly - 3 sets - 10 reps  • Seated Hip Adduction Isometrics with Ball - 1 x daily - 5 x weekly - 3 sets - 10 reps - 5 hold  • Seated Hip Abduction - 1 x daily - 5 x weekly - 3 sets - 10 reps  • Seated Toe Raise - 1 x daily - 7 x weekly - 2 sets - 10 reps  • Supine Ankle Pumps - 1 x daily - 5 x weekly - 2 sets - 10 reps  • Supine Heel Slide - 1 x daily - 5 x weekly - 2 sets - 10 reps  • Supine Bridge - 1 x daily - 3 x weekly - 2 sets - 10 reps  • Standing March with Counter Support - 1 x daily - 3 x weekly - 2 sets - 10 reps  • Side Stepping with Counter Support - 1 x daily - 3 x weekly - 5 sets

## 2022-12-01 ENCOUNTER — OFFICE VISIT (OUTPATIENT)
Dept: PHYSICAL THERAPY | Facility: CLINIC | Age: 82
End: 2022-12-01

## 2022-12-01 ENCOUNTER — OFFICE VISIT (OUTPATIENT)
Dept: OCCUPATIONAL THERAPY | Facility: CLINIC | Age: 82
End: 2022-12-01

## 2022-12-01 DIAGNOSIS — Z86.73 HISTORY OF CVA (CEREBROVASCULAR ACCIDENT): Primary | ICD-10-CM

## 2022-12-01 DIAGNOSIS — Z74.09 IMPAIRED FUNCTIONAL MOBILITY, BALANCE, GAIT, AND ENDURANCE: ICD-10-CM

## 2022-12-01 NOTE — PROGRESS NOTES
Occupational Therapy Daily Note:    Today's date: 2022  Patient name: Braeden Chadwick  : 1940  MRN: [id number]  Referring provider: Maximino Bonds PA-C  Dx:   Encounter Diagnosis   Name Primary? • History of CVA (cerebrovascular accident) Yes          Subjective: "I can't sleep on my side anymore in bed, I have a hard time rolling over "    Objective: Pt engaged in skilled OT treatment session with focus on UE strengthening, UE endurance, FMC/GMC, FMS/GMS, body awareness and bed mobility to increase engagement, endurance, tolerance, and independence with daily ADL and IADL tasks  CPT Code Minutes                                           Task Details        Therapeutic Activity 15 Pt completed seated fine motor task with use of green resistive clip to retrieve cotton balls with LUE placed on contralateral side to increase trunk rotation & core stability demand and place to webbed box at table top  No droppage noted in task, however pt demo some difficulty opening clip to successfully  cotton balls  Neuro Re-Ed 35 Pt completed supine task at mat focused on improving core stability/bed mobility, proximal strengthening, coordination and overall neuro motor re-ed to improve rolling over, sitting up and getting out of bed  Pt retrieved bean bags ipsilaterally, crossed midline and placed to contralateral side  Pt reported activity at medium difficulty level  Pt educ on hooking right leg under left leg to assist when sitting up in bed  Pt able to complete with good technique and form after 2 attempts  Therapeutic Exercise 10 Pt tolerated 5 mins x 2 in prograde/retrograde motions, starting at 2 0 resistance for 4 mins, then no resistance for remainder, seated on UBE with focus on increasing proximal UE strength, cardiopulmonary endurance, act tolerance, sustained grasp to inc indep and safety with ADL/IADL fxn and fxnl reaching tasks                  Manual Self Care         Assessment: Tolerated treatment well  Pt demo good comprehension of bridging technique for rolling over in bed  Pt demo increased difficulty rolling from right to left side, which may be more difficult on softer surface  Pt educ to continue practicing technique at home and demo G understanding or recommendation  Pt demo difficulty managing left leg movement while sitting up from bed due in part to LLE swelling, with massed practice of hooking technique, pt demo improvement  Pt continues to demo fine motor/intrinsice weakness  Patient would benefit from continued skilled OT  Plan: Continued skilled OT per POC        INTERVENTION COMMENTS:  Diagnosis: History of CVA (cerebrovascular accident) [Z86 73]  Precautions: Fall risk, pacemaker, No E-STIM   FOTO: Completed  Insurance: Payor: [facility] [address] / Plan: Ron Becker / Product Type: Medicare HMO /   12 of 16 visits, PN due 12/14/2022  POC ends 01/02/2023

## 2022-12-01 NOTE — PROGRESS NOTES
Daily Note     Today's date: 2022  Patient name: Jacek Finley  : 1940  MRN: [id number]  Referring provider: Angel Alvarez PA-C  Dx:   Encounter Diagnosis     ICD-10-CM    1  History of CVA (cerebrovascular accident)  Z86 73       2  Impaired functional mobility, balance, gait, and endurance  Z74 09                      Subjective: Patient reports no new complaints  Objective: See treatment diary below      Assessment: Tolerated treatment well  Eugenie Sabillon participated in skilled PT session focused on balance, gait, and endurance  Patient continues with some difficulty with L hip flexion weakness having difficulty clearing 6" step  Patient demonstrates improved STS transfers using tactile cues for increasing wt onto LLE  Patient would continue to benefit from skilled PT interventions to address deficits with balance, gait, and endurance  Patient demonstrated fatigue post treatment      Plan: Continue per plan of care  Precautions: HTN, a-fib, pacemaker      Manuals                                        Neuro Re-Ed         Standing marching         sidesteps SOLO w/SPC on R 1/4 length x 4 laps SOLO No AW No AD 1/4 length x 3 laps C/S SOLO No AW No AD 1/4 length x 3 laps SOLO  With SPC on R, 1/4 lap x 3 laps (no weights) SOLO  With SPC on R, 1/4 lap x 3 laps and 3# AW on L LE SOLO  With SPC on R, 1/4 lap x 3 laps   HKM SOLO w/SPC on R 1/4 length x 4 laps  No LOB SOLO No AW No AD  1/4 length x 3 laps C/S  SOLO   With SPC on R, 1/4 lap x 3 laps, (no weights) SOLO  With SPC on R, 1/4 lap x 3 laps, 3# AW on L SOLO  With SPC on R, 1/4 lap x 3 laps  1 LOB requiring SOLO   Sit to stand with LE elevated STS No UE support w/CGA to C/S  X 10  STS w/B/L UE  X 10 v c  for positioning     STS w/No UE x 10 v c  for postioning and wt shifting   STS with hands on knees and CGA/CS for anterior trunk lean 2 x 10 STS no UE support and CGA/min A x 10 reps    With R UE elevated on 4-inch step x 5 reps with single UE use STS no UE support with CGA x 10    X 10 with PB pulling behind     X 5 post tall kneel   Step taps SOLO w/SPC  On R 6" step   Taps x 10 R  X 10 L        backwards SOLO w/SPC on R No AW  1/4 length x 2 laps SOLO No AW No AD  1/4 length x 3 laps decreased LLE step length  SOLO  1/4 lap with SPC on R, 3# AW on L, x 3 laps SOLO  1/4 lap with SPC and 3# AW on R LE, x 3 laps    Step over jim SOLO w/SPC on   R 6" hurdles (4)  Fwd x 3 laps  SOLO No AW No AD   Low hurdles (7)  Fwd x 4 laps  Lat x 3 laps      Step up SOLO w/SPC on R 6" step  X 10 R  X 10 L    No AW    C/S to CGA with LLE lead  SOLO No AW No AD 6" step  X 10 R  X 10 L      C/S to CGA with LLE lead  6-inch with SPC on R, x 10 leading with R, down with R    Up with L, down with L x 10    3# AW on L LE    Tall kneeling      On mat table, min A to attain position   Sitting back on heels and up to tall kneel with chair in front x 15 reps    Tall kneel hold with alternating UE raises x 15 each            Ther Ex         Sit to stand         nustep         treadmill           Reviewed and performed additions to HEP  Seated Toe Raises x 10 AAROM  Heel slides x 10  Side stepping x 2 laps  Standing marches x 10 ea                                               Ther Activity         Stand pivot transfer         Bed mobility    Sit to supine x 10 each direction, cues for increased amplitude of movement  Sit to supine 30" intervals with 30" rest breaks x 3 reps, cues for increased amplitude of movement   Gait Training         Gait with SPC    SOLO  SPC on R, 1/4 lap x 3 laps, 2 laps with 3# AW on L SOLO  SPC on R 1/4 lap x 3 laps    With FWW in hallway    500 feet total, fast walking with ' fast walking with SPV    Gait training on TM SOLO TM w/B/L UE support    1 4 mph x 5 min    Seated rest break    1 4 mph x 5 min    Total 10 min SOLO TM B/L UE support    1 4 mph x 5 min  1 5 mph x 1 min    Seated rest break    1 5 mph x 5 min    Total 11 min SOLO  TM B/L UE     1 5 mph x 6 min 6 sec    Seated rest break    1 5 mph x 5 min    Total 11 min 6 sec     SOLO  TM with B/L UE support  1 4mph for 4 minutes    Seated rest break    1  4mph for 4 minutes   stairs         Modalities                                      Access Code: [email or web address]  URL: [email or web address]  Date: 10/13/2022  Prepared by: Kim Trevino    Exercises  · Seated Hamstring Stretch with Strap - 1 x daily - 7 x weekly - 3 sets - 30 hold  · Seated Gastroc Stretch with Strap - 1 x daily - 7 x weekly - 3 sets - 30 hold  · Seated Long Arc Quad - 1 x daily - 5 x weekly - 3 sets - 10 reps  · Seated March - 1 x daily - 5 x weekly - 3 sets - 10 reps  · Seated Heel Raise - 1 x daily - 5 x weekly - 3 sets - 10 reps  · Seated Hip Adduction Isometrics with Ball - 1 x daily - 5 x weekly - 3 sets - 10 reps - 5 hold  · Seated Hip Abduction - 1 x daily - 5 x weekly - 3 sets - 10 reps  Exercises   • Seated Hamstring Stretch with Strap - 1 x daily - 7 x weekly - 3 sets - 30 hold  • Seated Gastroc Stretch with Strap - 1 x daily - 7 x weekly - 3 sets - 30 hold  • Seated Long Arc Quad - 1 x daily - 5 x weekly - 3 sets - 10 reps  • Seated March - 1 x daily - 5 x weekly - 3 sets - 10 reps  • Seated Heel Raise - 1 x daily - 5 x weekly - 3 sets - 10 reps  • Seated Hip Adduction Isometrics with Ball - 1 x daily - 5 x weekly - 3 sets - 10 reps - 5 hold  • Seated Hip Abduction - 1 x daily - 5 x weekly - 3 sets - 10 reps  • Seated Toe Raise - 1 x daily - 7 x weekly - 2 sets - 10 reps  • Supine Ankle Pumps - 1 x daily - 5 x weekly - 2 sets - 10 reps  • Supine Heel Slide - 1 x daily - 5 x weekly - 2 sets - 10 reps  • Supine Bridge - 1 x daily - 3 x weekly - 2 sets - 10 reps  • Standing March with Counter Support - 1 x daily - 3 x weekly - 2 sets - 10 reps  • Side Stepping with Counter Support - 1 x daily - 3 x weekly - 5 sets

## 2022-12-01 NOTE — PROGRESS NOTES
Daily Note     Today's date: 2022  Patient name: Dionicia Ormond  : 1940  MRN: [id number]  Referring provider: Reginaldo Alfonso PA-C  Dx:   Encounter Diagnosis     ICD-10-CM    1  History of CVA (cerebrovascular accident)  Z86 73       2  Impaired functional mobility, balance, gait, and endurance  Z74 09           Start Time: 1000  Stop Time: 1100  Total time in clinic (min): 60 minutes    Subjective: Facundo Cervantes reports no new complaints  Objective: See treatment diary below      Assessment: Tolerated treatment well  Facundo Cervantes participated in skilled PT session focused on balance, gait, and endurance  Patient continues to demonstrates decreased L hip strength with having difficulty clearing 6" step  Patient demonstrates improved STS transfer with tactile cues for shifting more wt onto LLE  Patient would continue to benefit from skilled PT interventions to address deficits with balance, gait, and endurance  Patient demonstrated fatigue post treatment      Plan: Continue per plan of care  Precautions: HTN, a-fib, pacemaker      Manuals                                        Neuro Re-Ed         Standing marching         sidesteps SOLO w/SPC on R 1/4 length x 4 laps SOLO No AW No AD 1/4 length x 3 laps C/S SOLO No AW No AD 1/4 length x 3 laps SOLO  With SPC on R, 1/4 lap x 3 laps (no weights) SOLO  With SPC on R, 1/4 lap x 3 laps and 3# AW on L LE SOLO  With SPC on R, 1/4 lap x 3 laps   HKM SOLO w/SPC on R 1/4 length x 4 laps  No LOB SOLO No AW No AD  1/4 length x 3 laps C/S  SOLO   With SPC on R, 1/4 lap x 3 laps, (no weights) SOLO  With SPC on R, 1/4 lap x 3 laps, 3# AW on L SOLO  With SPC on R, 1/4 lap x 3 laps   1 LOB requiring SOLO   Sit to stand with LE elevated STS No UE support w/CGA to C/S  X 10  STS w/B/L UE  X 10 v c  for positioning     STS w/B/L UE x 5 v c  for LLE foot positioning    STS w/B/L UE  RLE on 4"step  X 5 CGA    X 5 No UE use  CGA to C/S Thank you for your visit today.    Please complete your labs in 1 month and follow up with Dr. Le in 6 weeks.        If you need a refill on your prescription, please call your pharmacy and let them know. Please be proactive and call before your medication runs out. The pharmacy will then contact us for the refill. Please allow 24-48 hours for the refill to be processed.      If Dr. Le has ordered additional laboratory or radiology testing to be done before your next scheduled office visit, those results will be discussed with you at that upcoming visit. This will allow you the opportunity to go over the results in person with Dr. Le.   If your results require immediate intervention, you will be contacted sooner by phone call.     STS with hands on knees and CGA/CS for anterior trunk lean 2 x 10 STS no UE support and CGA/min A x 10 reps    With R UE elevated on 4-inch step x 5 reps with single UE use STS no UE support with CGA x 10    X 10 with PB pulling behind     X 5 post tall kneel   Step taps SOLO w/SPC  On R 6" step   Taps x 10 R  X 10 L        backwards SOLO w/SPC on R No AW  1/4 length x 2 laps SOLO No AW No AD  1/4 length x 3 laps decreased LLE step length  SOLO  1/4 lap with SPC on R, 3# AW on L, x 3 laps SOLO  1/4 lap with SPC and 3# AW on R LE, x 3 laps    Step over jim SOLO w/SPC on   R 6" hurdles (4)  Fwd x 3 laps  SOLO No AD No AW  Low hurdles (7)  Fwd x 2 laps  RLE lead down  LLE lead back  CGA    SOLO No AD No AW  Over canes (4)  RLE lead down  LLE lead back  C/S x 2 laps      Step up SOLO w/SPC on R 6" step  X 10 R  X 10 L    No AW    C/S to CGA with LLE lead  SOLO No AW No AD  6" step  X 10 R  X 10 L  CGA at times  6-inch with SPC on R, x 10 leading with R, down with R    Up with L, down with L x 10    3# AW on L LE    Tall kneeling      On mat table, min A to attain position   Sitting back on heels and up to tall kneel with chair in front x 15 reps    Tall kneel hold with alternating UE raises x 15 each            Ther Ex         Sit to stand         nustep         treadmill           Reviewed and performed additions to HEP  Seated Toe Raises x 10 AAROM  Heel slides x 10  Side stepping x 2 laps  Standing marches x 10 ea                                               Ther Activity         Stand pivot transfer         Bed mobility    Sit to supine x 10 each direction, cues for increased amplitude of movement  Sit to supine 30" intervals with 30" rest breaks x 3 reps, cues for increased amplitude of movement   Gait Training         Gait with SPC    SOLO  SPC on R, 1/4 lap x 3 laps, 2 laps with 3# AW on L SOLO  SPC on R 1/4 lap x 3 laps    With FWW in hallway    500 feet total, fast walking with ' fast walking with SPV Gait training on TM SOLO TM w/B/L UE support    1 4 mph x 5 min    Seated rest break    1 4 mph x 5 min    Total 10 min SOLO TM B/L UE support    1 4 mph x 5 min  1 5 mph x 1 min    Seated rest break    1 5 mph x 5 min    Total 11 min SOLO  TM B/L UE     1 5 mph x 6' 6"    Seated rest break    1 5 mph x 5 min    Total 11'6"       SOLO  TM with B/L UE support  1 4mph for 4 minutes    Seated rest break    1  4mph for 4 minutes   stairs         Modalities                                      Access Code: [email or web address]  URL: [email or web address]  Date: 10/13/2022  Prepared by: Torey Kettle    Exercises  · Seated Hamstring Stretch with Strap - 1 x daily - 7 x weekly - 3 sets - 30 hold  · Seated Gastroc Stretch with Strap - 1 x daily - 7 x weekly - 3 sets - 30 hold  · Seated Long Arc Quad - 1 x daily - 5 x weekly - 3 sets - 10 reps  · Seated March - 1 x daily - 5 x weekly - 3 sets - 10 reps  · Seated Heel Raise - 1 x daily - 5 x weekly - 3 sets - 10 reps  · Seated Hip Adduction Isometrics with Ball - 1 x daily - 5 x weekly - 3 sets - 10 reps - 5 hold  · Seated Hip Abduction - 1 x daily - 5 x weekly - 3 sets - 10 reps  Exercises   • Seated Hamstring Stretch with Strap - 1 x daily - 7 x weekly - 3 sets - 30 hold  • Seated Gastroc Stretch with Strap - 1 x daily - 7 x weekly - 3 sets - 30 hold  • Seated Long Arc Quad - 1 x daily - 5 x weekly - 3 sets - 10 reps  • Seated March - 1 x daily - 5 x weekly - 3 sets - 10 reps  • Seated Heel Raise - 1 x daily - 5 x weekly - 3 sets - 10 reps  • Seated Hip Adduction Isometrics with Ball - 1 x daily - 5 x weekly - 3 sets - 10 reps - 5 hold  • Seated Hip Abduction - 1 x daily - 5 x weekly - 3 sets - 10 reps  • Seated Toe Raise - 1 x daily - 7 x weekly - 2 sets - 10 reps  • Supine Ankle Pumps - 1 x daily - 5 x weekly - 2 sets - 10 reps  • Supine Heel Slide - 1 x daily - 5 x weekly - 2 sets - 10 reps  • Supine Bridge - 1 x daily - 3 x weekly - 2 sets - 10 reps  • Standing March with Counter Support - 1 x daily - 3 x weekly - 2 sets - 10 reps  • Side Stepping with Counter Support - 1 x daily - 3 x weekly - 5 sets

## 2022-12-05 ENCOUNTER — OFFICE VISIT (OUTPATIENT)
Dept: OCCUPATIONAL THERAPY | Facility: CLINIC | Age: 82
End: 2022-12-05

## 2022-12-05 ENCOUNTER — EVALUATION (OUTPATIENT)
Dept: PHYSICAL THERAPY | Facility: CLINIC | Age: 82
End: 2022-12-05

## 2022-12-05 DIAGNOSIS — Z86.73 HISTORY OF CVA (CEREBROVASCULAR ACCIDENT): Primary | ICD-10-CM

## 2022-12-05 DIAGNOSIS — Z74.1 NEED FOR ASSISTANCE WITH PERSONAL CARE: ICD-10-CM

## 2022-12-05 DIAGNOSIS — Z74.09 IMPAIRED FUNCTIONAL MOBILITY, BALANCE, GAIT, AND ENDURANCE: ICD-10-CM

## 2022-12-05 NOTE — PROGRESS NOTES
Progress Note        Today's date: 2022  Patient name: Wisam Tavera  : 1940  MRN: [id number]  Referring provider: Iris Valencia PA-C  Dx:   Encounter Diagnosis     ICD-10-CM    1  History of CVA (cerebrovascular accident)  Z86 73       2  Impaired functional mobility, balance, gait, and endurance  Z74 09                       Assessment  22: Chen Chu has attended 16 sessions of physical therapy since initial evaluation  She continues to make good progress with therapy, and at this time has met 3/4 short term goals since last progress note  She is not using the wheelchair at this time, and does not need as much assistance at home with sit to stand or bed mobility  She continues to demonstrate good progress with all outcome measures  6MWT improved from 454 feet to 650 feet with FWW  TUG improved from 28 seconds to 23 seconds  5xSTS improved from 31 seconds to 20 seconds (with use of UE's)  She was able to complete TUG using SPC during progress note with CGA/CS, and was able to complete FGA utilizing SPC during session  Chen Chu continues to demonstrate good progress with therapy, and would benefit from continued skilled physical therapy to continue to decrease fall risk, improve balance, improve functional mobility, decrease caregiver burden, maximize function, improve gait, and improve endurance  Continue per current POC  22: Chen Chu has attended 8 sessions of physical therapy since initial evaluation  She is making good progress with therapy, and at this time has met 4/5 short term goals  She is able to perform sit to stand and transfers with SPV, and is walking for longer distances at home with FWW and rarely using the wheelchair (only when out of the house)   She is demonstrating good progress with all outcome measures at this time, gait speed improved from 0 30m/s to 0 46m/s, TUG improved from 54 seconds to 28 seconds, 6MWT improved from 130 feet (unable to complete 6MWT) to 454 feet in 6 minutes  Testing did not show an improvement with 5xSTS during testing, however patient was able to progress from requiring min A for sit to stand to completing with SPV in similar times  Michael Mcdonnell continues to demonstrate significant improvements in mobility, endurance, strength, functional tasks, decreased assistance with tasks within the home  She would benefit from continued skilled physical therapy to decrease fall risk, improve balance, improve functional mobility, decrease caregiver burden, maximize function, and improve endurance  Continue per current POC  Assessment details: Patient presents to outpatient physical therapy s/p CVA on 8/12/22, post inpatient rehabilitation and SNF stay  She presents with impaired L LE strength,  Increased L LE tone and spasticity, decreased endurance, decreased balance, increased fall risk, and requiring assistance for mobility tasks (gait, transfers, bed mobility)  She presents with increased fall risk from increased time to complete 5xSTS of 27 6 seconds, above cut-off score of 15 seconds  She also presents with decreased gait speed of 0 30 m/s, below cut-off score of 1 0m/s for increased risk of falls, and also is below cut-off score for needing assist with ADL's and IADLS (2 2K/M cut-off score), is more likely to be hospitalized (0 6m/s cut-off), and is considered a household walker  She is also considered a fall risk with TUG score of 54 4 seconds, above cut-off score of 13 5 seconds for fall risk  She demonstrates decreased LE strength and endurance from 5xSTS of 27 65 seconds (with min A due to retropulsion)  She also demonstrates decreased functional mobility and endurance with 2MWT of 110 feet, and able to ambulate 130 feet in total before requiring rest break   She would benefit from skilled physical therapy to decrease fall risk, improve balance, improve LE strength and endurance, improve cardiovascular endurance, improve functional mobility, decreased caregiver burden and maximize function  Impairments: abnormal gait, abnormal muscle tone, abnormal or restricted ROM, activity intolerance, impaired balance, impaired physical strength, pain with function and safety issue  Understanding of Dx/Px/POC: good   Prognosis: good    Goals  ST  Pt will improve gait speed to at least 0 40 m/s with least restrictive device within 4 weeks needed to improve safety with ambulation  MET  2  Pt will improve TUG time by at least 5 seconds to decrease fall risk and improve mobility in 4 weeks MET  3  Pt will improve 5xSTS score by at least 5 seconds within 4 weeks needed to reduce fall risk  Not met (able to do without assist)  4  Pt will improve 6MWT to at least 250 feet in 4 week to improve functional mobility and endurance in 4 weeks MET  5  Pt will improve bed mobility and transfers to [facility] to min A at most in 4 weeks to improve functional mobility and decrease caregiver burden in 4 weeks  MET     NEW STG (completed by 8 weeks, 2022)  1  Pt will improve gait speed to at least 0 6m/s with least restrictive device within 8 weeks needed to improve safety with ambulation MET at times  2  Pt will improve TUG score to <23 seconds in 8 weeks to decrease fall risk and improve functional mobility MET  3  Pt will improve 5xSTS to at least 20 seconds with SPV within 8 weeks to decrease fall risk PROGRESSING  4  Pt will improve 6MWT to at least 600 feet with LRAD in 8 weeks to improve functional mobility and endurance MET    LT  Pt will improve gait speed to at least 0 70 m/s with least restrictive device within 12 weeks needed to improve safety with ambulation  2  Pt will improve TUG score to <20 seconds in 12 weeks to decrease fall risk and improve functional mobility  3  Pt will improve 5xSTS score to <15 sec with SPV within 12 weeks needed to reduce fall risk  4  Pt will improve 6MWT to at least 700 feet in 12 weeks to improve functional moblity REVISED  5   Pt will demonstrate independence with HEP within 12 weeks  Plan  Plan details: 2-3x/week per patient tolerance  Patient would benefit from: skilled physical therapy  Planned therapy interventions: balance, balance/weight bearing training, neuromuscular re-education, motor coordination training, orthotic fitting/training, orthotic management and training, patient education, coordination, strengthening, stretching, flexibility, therapeutic activities, therapeutic training, therapeutic exercise, gait training, transfer training and home exercise program  Duration in weeks: 12  Plan of Care beginning date: 10/10/2022  Plan of Care expiration date: 1/2/2023  Treatment plan discussed with: patient        Subjective Evaluation    History of Present Illness  12/5/22: Patient reports that she is doing well  She states that therapy is helping  She states that her balance is doing better  She states that she can stand better with the walker  She reports that she needs less help getting out of bed  She reports that her left leg is moving better and doesn't feel as heavy as before  11/7/22: She states that she is making good improvements  She states that she has difficulty with getting out and getting into bed  She states she has a difficult time getting up from a chair  She reports that she needs help getting in and out of bed  She reports that she needs someone with her (home attendant or her son), especially to get up at night  Mechanism of injury: She usually lives alone, now her son is off from work to help her  She states she needs help to move from the bed  She states that she is walking with a walker at home  She states she sometimes uses the wheelchair in the house, but mainly when leaving the house  She has been staying at her home, but went to her son's house yesterday (bathroom 2nd floor)  She states she has difficulty with going down the stairs   She states she has pain in the knee down and the foot feels heavy and asleep on the L side  She states she wants to walk better with the walker  22 with contracted and weak LUE, LLE weakness, L facial droop, mild dysarthria and left sided sensory deficits  CTA showed near occlusive thormbus at R MCA bifurcation, as well as mild beading of the mid to distal ICAs suspicious for mild fibromuscular dysplasia, and mild atherosclerotic disease at bilateral distal carotid arteries  [facility] discharge on 22, discharged to SNF [facility]), discharged last week         Pain  Current pain ratin  At best pain ratin  At worst pain ratin  Location: L LE  Quality: cramping  Relieving factors: rest    Social Support  Steps to enter house: no  Stairs in house: no   Lives in: apartment  Lives with: alone    Treatments  Discharged from (in last 30 days): skilled nursing facility  Patient Goals  Patient goal: walk with the walker and get better        Objective       Manual Muscle Testing - Hip Left Right   Flexion 2+ 4+   Extension NT NT   Abduction NT NT     Manual Muscle Testing - Knee Left Right   Flexion 3+ 4+   Extension 3+ 4+     Manual Muscle Testing - Ankle Left Right   Doriflexion 2- 4+   Plantarflexion NT NT         Coordination Left Right   Alt toe tapping unable Not tested       Sensation Left Right   Kinesthesia NT NT   Light Touch intact intact       Muscle Tone (Modified Sada Scale**) Left Right   Hamstring 2 0   Gastroc 3 0   Quad 2 0   **  0 = no increase in muscle tone  1 = slight increase in muscle tone, minimal resistance at the end of ROM when moved  1+ = minimal resistance throughout the remainder (less than half) of ROM when moved  2 =  David increase in muscle tone through most of the ROM, but still moved easily  3 = considerable increase in muscle tone, movement difficult  4 = affects parts rigid       Balance Test Initial Eval 22 12/5    5x Sit to Stand: 27 6 with UE's on FWW, min A 31 22 seconds with UE's, SPV 20 9 second with UE's and SPV    TU 4 seconds with FWW 28 3 seconds with FWW and SPV 23 09 seconds with FWW and SPV    24 06 with SPC and CGA    Gait Speed:  0 30m/s with FWW and CGA 0 46 m/s with FWW and SPV 0 42m/s with SPC SSGS    0 43m/s with FWW SSGS    0 71 m/s during 6MWT (with FWW) fast gait speed    2 Minute Walk Test: 110 feet with FWW and  feet with FWW and  feet with FWW and SPV    6 Minute Walk Test: 130 feet in 2min 39 seconds 454 feet with FWW and SPV, completed full 6 minutes 650 feet with FWW in full 6 minutes    FGA:  NT  8/30, with [facility]          Transfers  11/7/22 12/5   Sit To Stand Min Assist SPV Mat table not available   W/C To Bed Mod Assist With FWW, SPV Mat table not available   Sit To Supine Mod Assist Min A at L LE Mat table not available   Roll Min Assist Min A Mat table not available         Gait Assessment: Decreased gait speed, decreased step length L>R, decreased heel strike bilaterally  Slightly flexed posture of L LE during swing and initial contact     Precautions: HTN, a-fib, pacemaker      Manuals 11/21 11/30 12/1 12/5                               Neuro Re-Ed    FGA 8/30 (with SPC)    TUG x 2 with FWW  TUG x 1 with SPC     Foam pads up and over       sidesteps SOLO w/SPC on R 1/4 length x 4 laps SOLO No AW No AD 1/4 length x 3 laps C/S SOLO No AW No AD 1/4 length x 3 laps SOLO  No AD  1/4 laps x 3 laps with 3# AW on L LE   HKM SOLO w/SPC on R 1/4 length x 4 laps  No LOB SOLO No AW No AD  1/4 length x 3 laps C/S  SOLO  No AD  1/4 lap x 3 laps with 3# AW on L LE   Sit to stand with TB resistance       Sit to stand with LE elevated STS No UE support w/CGA to C/S  X 10  STS w/B/L UE  X 10 v c  for positioning     STS w/No UE x 10 v c  for postioning and wt shifting      Step taps SOLO w/SPC  On R 6" step   Taps x 10 R  X 10 L      backwards SOLO w/SPC on R No AW  1/4 length x 2 laps SOLO No AW No AD  1/4 length x 3 laps decreased LLE step length     hurdles SOLO w/SPC on   R 6" hurdles (4)  Fwd x 3 laps SOLO No AW No AD   Low hurdles (7)  Fwd x 4 laps  Lat x 3 laps    Step up SOLO w/SPC on R 6" step  X 10 R  X 10 L    No AW    C/S to CGA with LLE lead  SOLO No AW No AD 6" step  X 10 R  X 10 L      C/S to CGA with LLE lead    Tall kneeling       Resisted walking       Ther Ex    6MWT with  feet   Sit to stand    5xSTS x 2 reps with UE's emphasis on fast/power   nustep       treadmill         Reviewed and performed additions to HEP  Seated Toe Raises x 10 AAROM  Heel slides x 10  Side stepping x 2 laps  Standing marches x 10 ea                                     Ther Activity       Stand pivot transfer       transfers    Sit to stand from toilet with HHA/CGA and SBA/min A for clothing management   Floor transfer       Bed mobility       Gait Training       Gait with SPC    Hallway 2 x 50'  amb with HT x 50'   With FWW in hallway       Gait training on TM SOLO TM w/B/L UE support    1 4 mph x 5 min    Seated rest break    1 4 mph x 5 min    Total 10 min SOLO TM B/L UE support    1 4 mph x 5 min  1 5 mph x 1 min    Seated rest break    1 5 mph x 5 min    Total 11 min SOLO  TM B/L UE     1 5 mph x 6 min 6 sec    Seated rest break    1 5 mph x 5 min    Total 11 min 6 sec      stairs    Up and over staircase x 2 laps, step-to pattern, BUE support   Modalities              Education                · Access Code: [email or web address]  URL: [email or web address]  Date: 10/13/2022  Prepared by: Curley Alpers      Exercises   • Seated Hamstring Stretch with Strap - 1 x daily - 7 x weekly - 3 sets - 30 hold  • Seated Gastroc Stretch with Strap - 1 x daily - 7 x weekly - 3 sets - 30 hold  • Seated Long Arc Quad - 1 x daily - 5 x weekly - 3 sets - 10 reps  • Seated March - 1 x daily - 5 x weekly - 3 sets - 10 reps  • Seated Heel Raise - 1 x daily - 5 x weekly - 3 sets - 10 reps  • Seated Hip Adduction Isometrics with Ball - 1 x daily - 5 x weekly - 3 sets - 10 reps - 5 hold  • Seated Hip Abduction - 1 x daily - 5 x weekly - 3 sets - 10 reps  • Seated Toe Raise - 1 x daily - 7 x weekly - 2 sets - 10 reps  • Supine Ankle Pumps - 1 x daily - 5 x weekly - 2 sets - 10 reps  • Supine Heel Slide - 1 x daily - 5 x weekly - 2 sets - 10 reps  • Supine Bridge - 1 x daily - 3 x weekly - 2 sets - 10 reps  • Standing March with Counter Support - 1 x daily - 3 x weekly - 2 sets - 10 reps  • Side Stepping with Counter Support - 1 x daily - 3 x weekly - 5 sets

## 2022-12-05 NOTE — PROGRESS NOTES
Occupational Therapy Daily Note:    Today's date: 2022  Patient name: Errol Storm  : 1940  MRN: [id number]  Referring provider: Wellington Martin PA-C  Dx:   Encounter Diagnoses   Name Primary? • History of CVA (cerebrovascular accident) Yes   • Need for assistance with personal care       TIME   /  5650-1442 G  6038-0201 Unsup    Subjective: "My left hand gets tired  It feels tired now " (after sustained FM task)     Objective: Pt engaged in skilled OT treatment session with focus on UE strengthening, UE endurance, FMC/GMC, FMS/GMS, body awareness and bed mobility to increase engagement, endurance, tolerance, and independence with daily ADL and IADL tasks  CPT Code Minutes                                           Task Details        Therapeutic Activity  Pt concluded in stance for 15+ min performing sustained FMS/FMC task using tweezers to grasp small pegs (placed to far L side of pt) requiring trunk rotation turns, to then place into board for target accuracy/placement to improve automaticity of grasp/release, functional grasping patterns using tweezers  Provided reacher resource / handout  Neuro Re-Ed               Therapeutic Exercise  Pt tolerated 5 mins x 2 in prograde/retrograde motions, starting at 1 0 resistance seated on UBE with focus on increasing proximal UE strength, cardiopulmonary endurance, act tolerance, sustained grasp to inc indep and safety with ADL/IADL fxn and fxnl reaching tasks  Pt then engaged in the following UB exercises using 4# 2 x10:  - forward trunk flexion to shoulder raise  - shoulder abduction to L < >R   - shoulder flexion 90*  - supinated bicep curls     Pt transitioned to FMS in L hand using orange/moderate resistance digit strengthener into 3 x10 digit extension from a full fist to extended digits  Manual          Self Care         Assessment: Tolerated treatment well   Patient would benefit from continued skilled OT      Pt tolerated OT well with G form during UB exercise using 4# dowel  Pt continues with intrinsic hand weakness as pt having difficulty with sustained grasping over time, noted by dropping of ~25% of pegs after picking up or during placement with difficulty manipulating to place into board  Plan: Continued skilled OT per POC  Follow up on bed mobility movements (from session with Giovana)! F/u on ADL's - continue to address / simulate for independence      INTERVENTION COMMENTS:  Diagnosis: History of CVA (cerebrovascular accident) [Z86 73]  Precautions: Fall risk, pacemaker, No E-STIM   FOTO: Completed  Insurance: Payor: [address] / Plan: [place] Daily / Product Type: Medicare HMO /   15 of 16 visits, PN due 12/14/2022  POC ends 01/02/2023

## 2022-12-07 ENCOUNTER — TELEPHONE (OUTPATIENT)
Dept: INTERNAL MEDICINE CLINIC | Facility: CLINIC | Age: 82
End: 2022-12-07

## 2022-12-07 ENCOUNTER — APPOINTMENT (OUTPATIENT)
Dept: OCCUPATIONAL THERAPY | Facility: CLINIC | Age: 82
End: 2022-12-07

## 2022-12-07 ENCOUNTER — APPOINTMENT (OUTPATIENT)
Dept: PHYSICAL THERAPY | Facility: CLINIC | Age: 82
End: 2022-12-07

## 2022-12-07 DIAGNOSIS — E53.8 B12 DEFICIENCY: Primary | ICD-10-CM

## 2022-12-07 DIAGNOSIS — R33.9 URINARY RETENTION: ICD-10-CM

## 2022-12-07 RX ORDER — TAMSULOSIN HYDROCHLORIDE 0.4 MG/1
0.4 CAPSULE ORAL
Qty: 30 CAPSULE | Refills: 2 | Status: SHIPPED | OUTPATIENT
Start: 2022-12-07

## 2022-12-07 NOTE — TELEPHONE ENCOUNTER
You sent script for the tablets, patient has been getting the injections  Is the patient switching over to tablets?

## 2022-12-07 NOTE — TELEPHONE ENCOUNTER
I switched the patient to tablets daily because they are equally effective as maintenance therapy as compared to her getting IM B12 supplementation  Also, in the interest of shared decision making, it would be more convenient for her to take a tablet daily long term  We can reassess this in another 4-8 weeks with repeat B12 level check and a CBC   Thank you! - Pardeep Oropeza

## 2022-12-07 NOTE — TELEPHONE ENCOUNTER
[address] Watters called to see why her Vitamin B-12 injection was switched to tablets  Patient was telling them that this is incorrect  Please see note from Dr Juan Knowles  Please contact patient to inform Patient why Dr Juan Knowles changed it from injection to tablets

## 2022-12-07 NOTE — TELEPHONE ENCOUNTER
Patient came in to office today for cyanocobalamin 1,000 mcg injection  We had one vial left here that  end of November so unable to use, vial was discarded  Please send refill as doc of the day to pharmacy, if appropriate       This is not showing on current med list

## 2022-12-07 NOTE — TELEPHONE ENCOUNTER
Called and spoke to patient, made patient aware as per result note  Patient stated she refused the tablets for the B12 as she tried that trial for the tablets before instead of the B12 injection and states it almost killed her by making her weak and pass out and her made her feel horrible  Patient states she would like to stick to the injection instead and refuse anything other then the injection       Please review and advise as patient has an NV tomorrow 12/08/22 for B12 injection

## 2022-12-08 DIAGNOSIS — E53.8 B12 DEFICIENCY: Primary | Chronic | ICD-10-CM

## 2022-12-08 RX ORDER — CYANOCOBALAMIN 1000 UG/ML
1000 INJECTION, SOLUTION INTRAMUSCULAR; SUBCUTANEOUS
Qty: 3 ML | Refills: 0 | Status: SHIPPED | OUTPATIENT
Start: 2022-12-08 | End: 2023-03-08

## 2022-12-09 ENCOUNTER — OFFICE VISIT (OUTPATIENT)
Dept: OCCUPATIONAL THERAPY | Facility: CLINIC | Age: 82
End: 2022-12-09

## 2022-12-09 ENCOUNTER — OFFICE VISIT (OUTPATIENT)
Dept: PHYSICAL THERAPY | Facility: CLINIC | Age: 82
End: 2022-12-09

## 2022-12-09 DIAGNOSIS — Z74.09 IMPAIRED FUNCTIONAL MOBILITY, BALANCE, GAIT, AND ENDURANCE: ICD-10-CM

## 2022-12-09 DIAGNOSIS — Z86.73 HISTORY OF CVA (CEREBROVASCULAR ACCIDENT): Primary | ICD-10-CM

## 2022-12-09 NOTE — PROGRESS NOTES
Daily Note     Today's date: 2022  Patient name: Jerry Lockwood  : 1940  MRN: [id number]  Referring provider: Nilton Hodge PA-C  Dx:   Encounter Diagnosis     ICD-10-CM    1  History of CVA (cerebrovascular accident)  Z86 73       2  Impaired functional mobility, balance, gait, and endurance  Z74 09                      Subjective: No new reports      Objective: See treatment diary below  Group [id number]  Self directed exercise from [id number]  1:1 with PT from 1207-1258    Assessment: Tolerated treatment well  Patient demonstrated fatigue post treatment, exhibited good technique with therapeutic exercises and would benefit from continued PT  Leanora Goltz was able to increase speed and time on TM compared to last session  Continues to require cues for L foot positioning during sit to stand, increased anterior trunk lean, and increased hip extension to attain standing  Was able to clear low hurdles with increase motor control with repetitions, leading with L LE, continues to have synergistic movement with L UE when performing  Plan: Continue per plan of care  Add ankle weights next session     Precautions: HTN, a-fib, pacemaker      Manuals                                     Neuro Re-Ed        Standing marching        sidesteps SOLO w/SPC on R 1/4 length x 4 laps SOLO No AW No AD 1/4 length x 3 laps C/S SOLO No AW No AD 1/4 length x 3 laps SOLO  No AW, no AD 1/4 lap x 3 laps    HKM SOLO w/SPC on R 1/4 length x 4 laps  No LOB SOLO No AW No AD  1/4 length x 3 laps C/S  SOLO  No AW, no AD  1/4 lap x 4 laps    Sit to stand with LE elevated STS No UE support w/CGA to C/S  X 10  STS w/B/L UE  X 10 v c  for positioning     STS w/No UE x 10 v c  for postioning and wt shifting   2 x 10, 1st set with feet equal no UE's  2nd set seated on foam and R foot forward    Step taps SOLO w/SPC  On R 6" step   Taps x 10 R  X 10 L       backwards SOLO w/SPC on R No AW  1/4 length x 2 laps SOLO No AW No AD  1/4 length x 3 laps decreased LLE step length  SOLO  No AW, No AD  1/4 lap x 3 laps, cues for increased step length    hurdles SOLO w/SPC on   R 6" hurdles (4)  Fwd x 3 laps  SOLO No AW No AD   Low hurdles (7)  Fwd x 4 laps  Lat x 3 laps SOLO  No AW, No AD  Low hurdles (4) forward x 4 laps    Step up SOLO w/SPC on R 6" step  X 10 R  X 10 L    No AW    C/S to CGA with LLE lead  SOLO No AW No AD 6" step  X 10 R  X 10 L      C/S to CGA with LLE lead     Tall kneeling        foam                Ther Ex        Sit to stand        nustep        treadmill          Reviewed and performed additions to HEP  Seated Toe Raises x 10 AAROM  Heel slides x 10  Side stepping x 2 laps  Standing marches x 10 ea                                          Ther Activity        Floor transfer        Bed mobility                Gait Training        Gait with SPC        With FWW in hallway        Gait training on TM SOLO TM w/B/L UE support    1 4 mph x 5 min    Seated rest break    1 4 mph x 5 min    Total 10 min SOLO TM B/L UE support    1 4 mph x 5 min  1 5 mph x 1 min    Seated rest break    1 5 mph x 5 min    Total 11 min SOLO  TM B/L UE     1 5 mph x 6 min 6 sec    Seated rest break    1 5 mph x 5 min    Total 11 min 6 sec   SOLO  TM with BUE support  1  5mph x 6 minutes    Seated rest break    1  7mph x 6 minutes    12 minutes total    staircase        Modalities                                  · Access Code: [email or web address]  URL: [email or web address]  Date: 10/13/2022  Prepared by: Curley Alpers    Exercises   • Seated Hamstring Stretch with Strap - 1 x daily - 7 x weekly - 3 sets - 30 hold  • Seated Gastroc Stretch with Strap - 1 x daily - 7 x weekly - 3 sets - 30 hold  • Seated Long Arc Quad - 1 x daily - 5 x weekly - 3 sets - 10 reps  • Seated March - 1 x daily - 5 x weekly - 3 sets - 10 reps  • Seated Heel Raise - 1 x daily - 5 x weekly - 3 sets - 10 reps  • Seated Hip Adduction Isometrics with Ball - 1 x daily - 5 x weekly - 3 sets - 10 reps - 5 hold  • Seated Hip Abduction - 1 x daily - 5 x weekly - 3 sets - 10 reps  • Seated Toe Raise - 1 x daily - 7 x weekly - 2 sets - 10 reps  • Supine Ankle Pumps - 1 x daily - 5 x weekly - 2 sets - 10 reps  • Supine Heel Slide - 1 x daily - 5 x weekly - 2 sets - 10 reps  • Supine Bridge - 1 x daily - 3 x weekly - 2 sets - 10 reps  • Standing March with Counter Support - 1 x daily - 3 x weekly - 2 sets - 10 reps  • Side Stepping with Counter Support - 1 x daily - 3 x weekly - 5 sets

## 2022-12-09 NOTE — PROGRESS NOTES
Occupational Therapy Daily Note:    Today's date: 2022  Patient name: Satish Lima  : 1940  MRN: [id number]  Referring provider: Corina Cunningham PA-C  Dx:   Encounter Diagnosis   Name Primary? • History of CVA (cerebrovascular accident) Yes       Subjective: "I'm doing well with moving in bed now "     Objective: Pt engaged in skilled OT treatment session with focus on UE strengthening, UE endurance, FMC/GMC, FMS/GMS, body awareness and refined motor tasks, activity tolerance/endurance to increase engagement, endurance, tolerance, and independence with daily ADL and IADL tasks  CPT Code Minutes                                           Task Details        Therapeutic Activity 35  Pt donned 2# weight cuff to distal UE while completing gingerbread man pin poke task to improve fine motor control, coordination, UE strength/endurance, standing endurance/tolerance, visual-perceptual skills & bimanual coordination  Pt alternated between standing & seated, with standing ~10 mins intervals, and 5 min seated while completing task  Weight cuff doffed for last 10 mins d/t fatigue  Frequent (50%) repositioning noted with push pin  Neuro Re-Ed 15 Pt completed in stance activity to improve coordination, core/trunk rotation, improve standing tolerance/endurance, hand to target accuracy and neuro motor re-ed  Pt completed parquetry puzzle with template positioned on vertical surface at mirror and pieces laterally and tabletop  Pt donned 2# weightcuff to distal UE for increased proprioceptive input and GMC/GMS  Pt required to turn laterally, retrieve puzzle pieces, then turn to place pieces to template  Pt in stance for >10 minutes, no seated rest break               Therapeutic Exercise 10 Pt tolerated 5 mins x 2 in prograde/retrograde motions, starting at 1 0 resistance seated on UBE with focus on increasing proximal UE strength, cardiopulmonary endurance, act tolerance, sustained grasp to inc indep and safety with ADL/IADL fxn and fxnl reaching tasks  Manual          Self Care         Assessment: Tolerated treatment well  Pt continues to demo fatigue with sustained standing and completing tasks requiring extended arm reach  Pt demo good progress in refined motor task, though required frequent breaks due to fatigue  Patient would benefit from continued skilled OT  Plan: Continued skilled OT per POC      INTERVENTION COMMENTS:  Diagnosis: History of CVA (cerebrovascular accident) [Z86 73]  Precautions: Fall risk, pacemaker, No E-STIM   FOTO: Completed  Insurance: Payor: [facility] IVONNE Montanowy / Plan: [employer] / Product Type: Medicare HMO /   15 of 16 visits, PN due 12/14/2022  POC ends 01/02/2023

## 2022-12-09 NOTE — TELEPHONE ENCOUNTER
Patient called for the status of the B12 injection  cyanocobalamin injection 1,000 mcg   Patient was advised that it was sent 12/8/22 to the [facility]

## 2022-12-12 ENCOUNTER — EVALUATION (OUTPATIENT)
Dept: OCCUPATIONAL THERAPY | Facility: CLINIC | Age: 82
End: 2022-12-12

## 2022-12-12 ENCOUNTER — OFFICE VISIT (OUTPATIENT)
Dept: PHYSICAL THERAPY | Facility: CLINIC | Age: 82
End: 2022-12-12

## 2022-12-12 DIAGNOSIS — Z74.1 NEED FOR ASSISTANCE WITH PERSONAL CARE: ICD-10-CM

## 2022-12-12 DIAGNOSIS — Z86.73 HISTORY OF CVA (CEREBROVASCULAR ACCIDENT): Primary | ICD-10-CM

## 2022-12-12 DIAGNOSIS — Z74.09 IMPAIRED FUNCTIONAL MOBILITY, BALANCE, GAIT, AND ENDURANCE: ICD-10-CM

## 2022-12-12 NOTE — PROGRESS NOTES
Daily Note     Today's date: 2022  Patient name: Hari Mcneill  : 1940  MRN: [id number]  Referring provider: Angella Franco PA-C  Dx:   Encounter Diagnosis     ICD-10-CM    1  History of CVA (cerebrovascular accident)  Z86 73       2  Impaired functional mobility, balance, gait, and endurance  Z74 09                      Subjective: No new reports      Objective: See treatment diary below      Assessment: Tolerated treatment well  Patient demonstrated fatigue post treatment, exhibited good technique with therapeutic exercises and would benefit from continued PT  Violeta Waterman was able to maintain increased speed from last visit with increased time compared to last session, with 13 minutes total completed on TM during session  Added stepping up and over for curb negotiation, patient was able to complete up and over with R LE leading with 2 LOB requiring assist from SOLO/therapist  Attempted leading with L LE, however difficulty with clearing L foot on descent (heel catching) and patient refused further attempt due to LE fatigue  Demonstrated increased step length on L LE during backwards walking  Was able to use 3# AW during 2nd trial on TM and part of exercises, continue to attempt to use AW  Was discontinued during session due to fatigue and inability to clear hurdles  Continue to progress as tolerated  Plan: Continue per plan of care  Add ankle weights next session     Precautions: HTN, a-fib, pacemaker      Manuals                                    Neuro Re-Ed        Standing marching        sidesteps SOLO w/SPC on R 1/4 length x 4 laps SOLO No AW No AD 1/4 length x 3 laps C/S SOLO No AW No AD 1/4 length x 3 laps SOLO  No AW, no AD 1/4 lap x 3 laps    HKM SOLO w/SPC on R 1/4 length x 4 laps    No LOB SOLO No AW No AD  1/4 length x 3 laps C/S  SOLO  No AW, no AD  1/4 lap x 4 laps    Sit to stand with LE elevated STS No UE support w/CGA to C/S  X 10  STS w/B/L UE  X 10 v c  for positioning     STS w/No UE x 10 v c  for postioning and wt shifting  2 x 10, 1st set with feet equal no UE's  2nd set seated on foam and R foot forward Cues throughout session for equal foot positioning   Step taps SOLO w/SPC  On R 6" step   Taps x 10 R  X 10 L       backwards SOLO w/SPC on R No AW  1/4 length x 2 laps SOLO No AW No AD  1/4 length x 3 laps decreased LLE step length  SOLO  No AW, No AD  1/4 lap x 3 laps, cues for increased step length SOLO  No AW, no AD  1/4 lap x 3 laps   hurdles SOLO w/SPC on   R 6" hurdles (4)  Fwd x 3 laps  SOLO No AW No AD   Low hurdles (7)  Fwd x 4 laps  Lat x 3 laps SOLO  No AW, No AD  Low hurdles (4) forward x 4 laps SOLO  Attempted 3# AW for 1 lap, no AW for 3 laps leading with L LE forward  Step up SOLO w/SPC on R 6" step  X 10 R  X 10 L    No AW    C/S to CGA with LLE lead  SOLO No AW No AD 6" step  X 10 R  X 10 L      C/S to CGA with LLE lead  SOLO  No AW, 6-inch step, up and over curb negotiation  X 10 reps leading with R, x 2 reps leading with L (discontinued due patient request)     Tall kneeling        foam                Ther Ex        Sit to stand        nustep        treadmill          Reviewed and performed additions to HEP  Seated Toe Raises x 10 AAROM  Heel slides x 10  Side stepping x 2 laps  Standing marches x 10 ea                                          Ther Activity        Floor transfer        Bed mobility     Sit to supine and rolling with 3# AW on L LE, x 7 reps            Gait Training        Gait with SPC        With FWW in hallway        Gait training on TM SOLO TM w/B/L UE support    1 4 mph x 5 min    Seated rest break    1 4 mph x 5 min    Total 10 min SOLO TM B/L UE support    1 4 mph x 5 min  1 5 mph x 1 min    Seated rest break    1 5 mph x 5 min    Total 11 min SOLO  TM B/L UE     1 5 mph x 6 min 6 sec    Seated rest break    1 5 mph x 5 min    Total 11 min 6 sec   SOLO  TM with BUE support  1  5mph x 6 minutes    Seated rest break    1  7mph x 6 minutes    12 minutes total SOLO  TM with BUE support  1  7mph x 7 minutes    3# AW on L LE for 2nd trial 1 5 for 3 minutes, up to 1 7 for total of 6 minutes    13 minutes total   staircase        Modalities                                  · Access Code: [email or web address]  URL: [email or web address]  Date: 10/13/2022  Prepared by: Katelin Strong   • Seated Hamstring Stretch with Strap - 1 x daily - 7 x weekly - 3 sets - 30 hold  • Seated Gastroc Stretch with Strap - 1 x daily - 7 x weekly - 3 sets - 30 hold  • Seated Long Arc Quad - 1 x daily - 5 x weekly - 3 sets - 10 reps  • Seated March - 1 x daily - 5 x weekly - 3 sets - 10 reps  • Seated Heel Raise - 1 x daily - 5 x weekly - 3 sets - 10 reps  • Seated Hip Adduction Isometrics with Ball - 1 x daily - 5 x weekly - 3 sets - 10 reps - 5 hold  • Seated Hip Abduction - 1 x daily - 5 x weekly - 3 sets - 10 reps  • Seated Toe Raise - 1 x daily - 7 x weekly - 2 sets - 10 reps  • Supine Ankle Pumps - 1 x daily - 5 x weekly - 2 sets - 10 reps  • Supine Heel Slide - 1 x daily - 5 x weekly - 2 sets - 10 reps  • Supine Bridge - 1 x daily - 3 x weekly - 2 sets - 10 reps  • Standing March with Counter Support - 1 x daily - 3 x weekly - 2 sets - 10 reps  • Side Stepping with Counter Support - 1 x daily - 3 x weekly - 5 sets

## 2022-12-12 NOTE — PROGRESS NOTES
OCCUPATIONAL THERAPY Progress Note #2:      12/12/2022  Hari Mcneill  1940  [id number]  Angella Franco PA-C     Diagnosis ICD-10-CM Associated Orders   1  History of CVA (cerebrovascular accident)  Z86 73       2  Need for assistance with personal care  Z74 1           Assessment/Plan    Skilled Analysis:  Hari Mcneill is a 80 y o  female referred to Occupational Therapy s/p History of CVA (cerebrovascular accident) [Z86 73]  Pt participated in skilled OT progress note #2 to assess functional progression towards goals/POC  Pt reports since start of OT, her LUE strength and ROM has improved for ADLs / IADLs  She reports improved ability to grasp small objects without dropping it  Pt is requiring less assistance for dressing/showering tasks and has been educated on AE to improve independence including a long handled sponge and dressing stick  Pt has demo improved FM testing and speed with the 9 hole peg and functional dexterity by at least 10 sec  Pt continues to report difficulty with handwriting endurance and grasp for sustained periods  Pt is continuing to present with the following areas of deficit: decreased endurance and proximal strength, decreased strength and grasp on utensils for sustained periods of time, difficulty with in hand manipulation/rotation demands  Pt has met 90% of her STG below and is progressing towards her LTG's  Pt will continue to benefit from continued skilled Occupational Therapy services 2x/week for 6 weeks with focus on HEP for carryover with safety during ADLs/IADLs, UE strength, proprioception, sensation, and FMC/GMC for overall QOL  Hari Mcneill is in agreement with POC  Motor Short Term Goals (4-6 weeks)    Strength/Endurance  ·  Pt will increase LUE proximal shoulder strength to 4+/5  through the use of strengthening exercises and HEP for improving performance during ADL/IADLs for eventual return to life roles   = PARTIALLY MET  · Pt will increase LUE distal forearm/wrist strength to 4+/5 through the use of strengthening exercises and HEP for eventual return to life roles and independence with ADL/IADLs  = PARTIALLY MET  · Pt will demo with G tolerance to supine, seated, and in stance exercise x 15-20 minutes with minimal rest breaks required for increased engagement in life roles and increased independence with ADLs/IADLs  = GOAL MET  · Pt will demo with G carryover of HEP to improve functional progression towards goals in Plan of care and for improved functional use of LUE  = GOAL MET    FMC/GMC   · Pt will increase LUE rate of manipulation by 10% for all FM tests for improved functional performance with salient tasks = GOAL MET  · Pt will demo improved motor learning of constructional and new motor actions for improved b/l coordination and motor planning evident by 75% accuracy for fxnl ADL/IADL performance = GOAL MET    Functional Performance   · Pt will increase LUE to functional assist (UE has full AROM, uses for all activities and fine motor tasks, but remains assistive with mild awkwardness and weakness) for ADL/IADLs and tabletop tasks for improved functional performance of life roles   = GOAL MET  · Pt will demo G comprehension of adaptive equipment (long handled reacher/sponge/shoehorn, toileting aid) use in clinic to improve independence in ADL management in home environment  = GOAL MET  · Pt will increase proprioception of LUE hand to target for improved functional reach vision occluded with ADL tasks  x 75% accuracy = GOAL MET  · Pt will require < moderate assist for ADLs (dressing, showering, toileting, grooming) for improved independence with all self-care tasks = GOAL MET  · Pt will be able to manage all medications, laundry, and cooking with < Moderate assist from son in order to increase IADL independence = GOAL MET        Motor Long Term Goals (12 weeks)     Strength/Endurance  ·  Pt will increase LUE proximal shoulder strength to 5/5  through the use of strengthening exercises and HEP for improving performance during ADLs/IADLs for eventual return to life roles  · Pt will increase LUE distal forearm/wrist strength to 5/5  through the use of strengthening exercises and HEP for eventual return to life roles and independence with ADL/IADLs  · Pt will demo with G tolerance to supine, seated, and in stance exercise x 25-35 minutes with minimal rest breaks required for increased engagement in life roles and increased independence with ADL/IADLs  FMC/GMC   · Pt will increase LUE rate of manipulation by 15% for all FM tests for improved functional performance with salient tasks   · Pt will demo improved motor learning of constructional and new motor actions for improved b/l coordination and motor planning evident by 90% accuracy for fxnl ADL/IADL performance    Functional Performance   · Pt will increase LUE to fully functional assist (completely functional, used as dominant UE) for ADL/IADL and tabletop tasks for improved functional performance of life roles  · Pt will demo excellent comprehension of adaptive equipment (long handled reacher/sponge/shoehorn, toileting aid) use in clinic to improve independence in ADL management in home environment     · Pt will increase proprioception of LUE hand to target for improved functional reach vision occluded with ADL tasks  x 90% accuracy  · Pt will completed ADLs (dressing, showering, toileting, grooming) with modified independence for improved independence with all self-care tasks  · Pt will be able to manage all medications, laundry, and cooking with Modified [place] in order to increase IADL independence         Subjective    SUBJECTIVE: "You have helped me so much!"    PATIENT GOAL: "I would like to have 100% again"     Patient-Specific Functional Scale   Task is scored 0 (unable to perform activity) to 10 (ability to perform activity independently)    Activity Date:10/10/2022 Date: 12/12/2022 1  Dressing (shirts and pants) 2-3/10 Pt unable to dress self without assistance 8/10 with shirt / jacket   "When I put on my L arm first, it's better "     8/10 pants IMPROVED   2  Bathing 4/10 7/10   IMPROVED  "I am doing that well  It's is better "   Has a long handled sponge, does not use  3    Strength of LUE Arm 5/10  Leg 2/10 7/10  "It's much better "   Reports improved reaching and ER or shoulder      4  Grasping/pinching 5/10 Pt has difficulty with picking up pills and small objects 8/10   IMPROVED    5  Writing  (NEW 12/12)   3-4/10   "Very low!"        HISTORY OF PRESENT ILLNESS:     Marli Norton is a 80 y o  female who was referred to Occupational Therapy s/p  History of CVA (cerebrovascular accident) [Z86 73]  Pt with recent hospitalization from [place] [id number] from 8/12/2022-8/19/2022 presenting with weak LUE, flaccid LLE, left facial droop, mild dysarthria, and mild sensory deficits on the left side  Pt reported she had difficulty moving from her bed on 8/12/2022, son called ambulance  CTA revealed near occlusive thrombus at R MCA bifurcation  Pt was started on stroke protocol heparin gtt (drops)  Pt changed medications from Xarelto to Pradaxa (think Xarelto failure was cause of stroke)  Pt d/c to [facility] rehab where she received OT, PT, ST from 8/13/2022-9/13/2022  Pt transitioned to [facility] where she was d/c 9/24/2022 home to her family  Pt has PMH of a-fib on Xarelto (now changed) with MRI-incompatible pacemaker, osteoarthritis in both wrists, HLD, and HTN  Pt resides in an apartment on the 8th floor with GARRY but uses the elevator  With son close by, pt is using walker to get around her apartment  Pt's son is currently living with her for assistance with ADLs/IADLs  Pt is completing ADLs with assistance from son  Pt is currently using DME (walker, w/c, toilet commode, shower chair)   Looking into getting a medical bed for repositioning and upright posture due to risk for aspiration/coughing  Pt is not currently using any AE  Pt likes to shop, go to [employer], watching tv  Pt has 3 grandchildren  Pt is currently retired but used to work with [employer]  Pt was independent prior to the stroke  Pt son applied for pt to get into a lower level apartment in case of emergencies  Possibly looking for HHA  Pt presenting with ongoing difficulties with: ADLs/IADLs (bathing, eating, dressing), pinching pills, doing laundry, vision blurriness, reaching with LUE and LUE strength  PMH:   Past Medical History:   Diagnosis Date   • [facility]    • Anemia    • Arthritis    • Breast pain, right    • Chest pain on breathing    • Colon polyp    • Cough    • Diverticulosis    • Encounter for screening colonoscopy    • H/O sick sinus syndrome    • Heart murmur    • High cholesterol    • History of atrial fibrillation     Rate ontrolled  On xarelto 15mg (creatinine 50) Followed by Dr Bibi Burnette with Cardiology    • History of weight loss     Report loose fitting clothes  Weight stable (3lbs difference)  Last colo showed small tubular adenoma x2  Breast biopsy last showed fibrocystic changes  TSH wnl  Will check cbc, bmp, spep         • Hx of long term use of blood thinners    • Hypertension    • Irregular heart beat    • Pacemaker    • Preop examination    • Shortness of breath    • Viral bronchitis        Pain Levels   Restin/10 - Pain in her L hip/leg   Pain is mainly when she is in bed trying to get comfortable    With Activity:   Some pain in her leg (numbness) and decreased balance after walking with walker    Objective    Impairment Observations:    SEGUNDO CHANG Comments           UPPER EXTREMITY FUNCTION   Intact Impaired Dominant Hand: Left     /PINCH STRENGTH             Dynamometer    - Gross Grasp 40 lns 30 lbs low   R increased 4 lbs  L decreased 5 lbs     Pinch Meter     - Pincer 8 lbs 8 lbs low   L remained     - Tripod 10 lbs 10 lbs   low   L increased 2 lbs    - Lateral 9 lbs 8 lbs low   L remained     AROM (Seated)            Elevation Full 1/2    Shoulder FF Full  130*     Shoulder Ext Full  68*    Shoulder Abd Full  Full     Shoulder Add Full  Full     Horizontal Abd Full  Full     Horizontal Add Full  WFL     Elbow Flex Full  WFL     Elbow Ext Full  Full     Pronation Full  Full     Supination Full  Full     Wrist Flex Full  40*     Wrist Ext Full  21*     Digit Flex Full  Full     Digit Ex Full  Full     Hook Grasp Full  Full     Subluxation  None  None       MMT             Shoulder FF 4+/5 4-/5 Improved L   Shoulder Ext 4+/5 4/5    Shoulder Abduction 4/5 4/5    Shoulder Adduction 4/5 4-/5 Improved    Elbow Flex 4+/5 4+/5    Elbow Ext 4+/5 4/5    Wrist Flex 4+/5 4-/5    Wrist Ext 4/5 4/5      SENSATION      Monofilament Testing  Normal: 2 83 mm    Diminished light touch: 3 61 mm    Diminished protective sensation: 4 31 mm    Loss of protective sensation: 4 56    Complete loss of sensation / deep pressure: 6 65 3 61 mm 3 61 mm L/R remained still unable to feel 2 83 mm on L/RUE    Sharp / Dull  Intact Intact    Proprioception Intact Impaired    Hot/Cold Temp Intact Intact    Stereognosis  Intact Intact      COORDINATION      Opposition Intact WFL WFL but slow movements with LUE   Finger to Nose WFL Impaired Slow movements with vision occluded   Rapid Alternating Movement Impaired Impaired Slow movements, uncoordinated with both L and R hand   9 Hole Peg Test 33 seconds 30 seconds subnormal   L improved 13 sec      Fxnl Dexterity Test 38 seconds    Used board 100% of the time to assist in rotation of pegs 46 sec     Used board and/or rotating arm 50% of time low     L improved 10 sec    Moose Hand Function Test         - Handwriting  44 sec   abnormal      - Card Turning  8 78 sec  subnormal      - Small Item p/u  12 22 subnormal      - Simulated Feeding  18 33 sec  abnormal      - Stacking Checkers  12 90 sec subnormal      - Moving Light Objects  4 67 sec within normal limits - Moving Heavy Objects  4 67 sec  within normal limits   Concentric Circles Test (tremors)          TONE: MODIFIED BERTIN SCALE        No increase in muscle tone (0)  0    Slight Increase in muscle tone with catch and release or min resist at end range (1)      Slight Increase in muscle tone with catch and release, followed by min resistance through remainder of range (1+)      Increased muscle tone through full range, able to be moved easily (2)      Considerable increase in tone, difficult to move (3)      Rigid in Flexion/Extension (4)              Today's session:      OTHER PLANNED THERAPY INTERVENTIONS:    Supine, seated, and in stance neuro re-ed  Task-Oriented Training  Tricep AG  Hot Packs for pain  FMC/prehension  GMC  Proximal to distal teaming  Timed Trials  Manual tx  Hand to target  Sensory re-ed (Cutaneous/Proprioceptive)  Seated functional reach: crossing midline  Supine place and hold   WBearing strategies   Closed chain activities  Open chain activities  HEP    TODAYS TREATMENT  There act:  Pt engaged in sustained handwriting of copying written numbers and small sentences to improve sustained grasp/strength, FMS on utensil  Pt benefit from cues to increase line adherence, maintain spacing, and sizing of letters  Pt demo varying letter formation 2* weakness and pressure grading through pen       INTERVENTION COMMENTS:  Diagnosis: History of CVA (cerebrovascular accident) [Z86 73]  Precautions: Fall risk, pacemaker, No E-STIM   FOTO: Completed  Insurance: Payor: Jayashree Montanowsharonda / Plan: Jackson Castillo / Product Type: Medicare HMO /   15 of 16 visits, PN due 1/12/2023  POC ends 01/23/2023

## 2022-12-13 ENCOUNTER — TELEPHONE (OUTPATIENT)
Dept: INTERNAL MEDICINE CLINIC | Facility: CLINIC | Age: 82
End: 2022-12-13

## 2022-12-13 ENCOUNTER — CLINICAL SUPPORT (OUTPATIENT)
Dept: INTERNAL MEDICINE CLINIC | Facility: CLINIC | Age: 82
End: 2022-12-13

## 2022-12-13 DIAGNOSIS — M79.675 PAIN DUE TO ONYCHOMYCOSIS OF TOENAILS OF BOTH FEET: ICD-10-CM

## 2022-12-13 DIAGNOSIS — B35.1 PAIN DUE TO ONYCHOMYCOSIS OF TOENAILS OF BOTH FEET: ICD-10-CM

## 2022-12-13 DIAGNOSIS — E53.8 B12 DEFICIENCY: Chronic | ICD-10-CM

## 2022-12-13 DIAGNOSIS — R60.9 SWELLING: Primary | ICD-10-CM

## 2022-12-13 DIAGNOSIS — M79.674 PAIN DUE TO ONYCHOMYCOSIS OF TOENAILS OF BOTH FEET: ICD-10-CM

## 2022-12-13 RX ADMIN — CYANOCOBALAMIN 1000 MCG: 1000 INJECTION, SOLUTION INTRAMUSCULAR; SUBCUTANEOUS at 09:48

## 2022-12-14 ENCOUNTER — OFFICE VISIT (OUTPATIENT)
Dept: OCCUPATIONAL THERAPY | Facility: CLINIC | Age: 82
End: 2022-12-14

## 2022-12-14 ENCOUNTER — OFFICE VISIT (OUTPATIENT)
Dept: PHYSICAL THERAPY | Facility: CLINIC | Age: 82
End: 2022-12-14

## 2022-12-14 DIAGNOSIS — Z86.73 HISTORY OF CVA (CEREBROVASCULAR ACCIDENT): Primary | ICD-10-CM

## 2022-12-14 DIAGNOSIS — Z74.09 IMPAIRED FUNCTIONAL MOBILITY, BALANCE, GAIT, AND ENDURANCE: ICD-10-CM

## 2022-12-14 NOTE — PROGRESS NOTES
Daily Note     Today's date: 2022  Patient name: Nas Brown  : 1940  MRN: [id number]  Referring provider: Ky Villalobos PA-C  Dx:   Encounter Diagnosis     ICD-10-CM    1  History of CVA (cerebrovascular accident)  Z86 73       2  Impaired functional mobility, balance, gait, and endurance  Z74 09           Start Time: 1000  Stop Time: 1100  Total time in clinic (min): 60 minutes    Subjective: Patient reports that she was tired yesterday from previous session and had a hard time getting out of bed  Objective: See treatment diary below      Assessment: Tolerated treatment well  Patient focused on gait training on time with emphasis on LLE bigger step length and amplitude with adding AW and attempting to increase speed on TM  Patient demonstrates increased difficulty this session with achieving increased LLE step length and amplitude  Patient demonstrated fatigue post treatment      Plan: Continue per plan of care  Precautions: HTN, a-fib, pacemaker      Manuals                                    Neuro Re-Ed        Standing marching        sidesteps  SOLO No AW No AD 1/4 length x 3 laps C/S SOLO No AW No AD 1/4 length x 3 laps SOLO  No AW, no AD 1/4 lap x 3 laps    HKM  SOLO No AW No AD  1/4 length x 3 laps C/S  SOLO  No AW, no AD  1/4 lap x 4 laps    Sit to stand with LE elevated  STS w/B/L UE  X 10 v c  for positioning     STS w/No UE x 10 v c  for postioning and wt shifting   2 x 10, 1st set with feet equal no UE's  2nd set seated on foam and R foot forward Cues throughout session for equal foot positioning   Step taps        backwards SOLO 3# AW on LLE 1/4 length x 1 laps Patient c/o wt to heavy 2# Aw on LLE 1/4 length x 2 laps  V c  for increasing LLE step length SOLO No AW No AD  1/4 length x 3 laps decreased LLE step length  SOLO  No AW, No AD  1/4 lap x 3 laps, cues for increased step length SOLO  No AW, no AD  1/4 lap x 3 laps   hurdles SOLO 2# AW on LLE low hurdles (5) leading w/LLE  Fwd x 1 lap w/AW  Fwd x 1 lap without AW  Patient declined further hurdles stating I can not do it  SOLO No AW No AD   Low hurdles (7)  Fwd x 4 laps  Lat x 3 laps SOLO  No AW, No AD  Low hurdles (4) forward x 4 laps SOLO  Attempted 3# AW for 1 lap, no AW for 3 laps leading with L LE forward  Step up SOLO No AW 6" step up/over x 10 reps w/RLE lead  Increased difficulty this session with LLE amplitude  SOLO No AW No AD 6" step  X 10 R  X 10 L      C/S to CGA with LLE lead  SOLO  No AW, 6-inch step, up and over curb negotiation  X 10 reps leading with R, x 2 reps leading with L (discontinued due patient request)     Tall kneeling        foam                Ther Ex        Sit to stand        nustep        treadmill          Reviewed and performed additions to HEP  Seated Toe Raises x 10 AAROM  Heel slides x 10  Side stepping x 2 laps  Standing marches x 10 ea                                          Ther Activity        Floor transfer        Bed mobility     Sit to supine and rolling with 3# AW on L LE, x 7 reps            Gait Training        Gait with SPC        With FWW in hallway        Gait training on TM SOLO TM w/B/L UE support    1 5 mph x 7 min  Patient requested lower speed due to very tired yesterday and could not get out of bed  3# AW on LLE 1 5 mph - 1 4 mph x 3 min  Patient states she is tired and can not do it  Needed encouragement to finish the 3 min  SOLO TM B/L UE support    1 4 mph x 5 min  1 5 mph x 1 min    Seated rest break    1 5 mph x 5 min    Total 11 min SOLO  TM B/L UE     1 5 mph x 6 min 6 sec    Seated rest break    1 5 mph x 5 min    Total 11 min 6 sec   SOLO  TM with BUE support  1  5mph x 6 minutes    Seated rest break    1  7mph x 6 minutes    12 minutes total SOLO  TM with BUE support  1  7mph x 7 minutes    3# AW on L LE for 2nd trial 1 5 for 3 minutes, up to 1 7 for total of 6 minutes    13 minutes total   staircase        Modalities · Access Code: [email or web address]  URL: [email or web address]  Date: 10/13/2022  Prepared by: Mickie Holly    Exercises   • Seated Hamstring Stretch with Strap - 1 x daily - 7 x weekly - 3 sets - 30 hold  • Seated Gastroc Stretch with Strap - 1 x daily - 7 x weekly - 3 sets - 30 hold  • Seated Long Arc Quad - 1 x daily - 5 x weekly - 3 sets - 10 reps  • Seated March - 1 x daily - 5 x weekly - 3 sets - 10 reps  • Seated Heel Raise - 1 x daily - 5 x weekly - 3 sets - 10 reps  • Seated Hip Adduction Isometrics with Ball - 1 x daily - 5 x weekly - 3 sets - 10 reps - 5 hold  • Seated Hip Abduction - 1 x daily - 5 x weekly - 3 sets - 10 reps  • Seated Toe Raise - 1 x daily - 7 x weekly - 2 sets - 10 reps  • Supine Ankle Pumps - 1 x daily - 5 x weekly - 2 sets - 10 reps  • Supine Heel Slide - 1 x daily - 5 x weekly - 2 sets - 10 reps  • Supine Bridge - 1 x daily - 3 x weekly - 2 sets - 10 reps  • Standing March with Counter Support - 1 x daily - 3 x weekly - 2 sets - 10 reps  • Side Stepping with Counter Support - 1 x daily - 3 x weekly - 5 sets

## 2022-12-14 NOTE — PROGRESS NOTES
Occupational Therapy Daily Note:    Today's date: 2022  Patient name: Sukumar Burgos  : 1940  MRN: [id number]  Referring provider: David Quiroz PA-C  Dx:   Encounter Diagnosis   Name Primary? • History of CVA (cerebrovascular accident) Yes       Subjective: "I'm tired "    Objective: Pt engaged in skilled OT treatment session with focus on UE strengthening, UE endurance, FMC/GMC, FMS/GMS, body awareness and refined motor tasks, activity tolerance/endurance to increase engagement, endurance, tolerance, and independence with daily ADL and IADL tasks  CPT Code Minutes                                           Task Details        Therapeutic Activity   Pt donned 2# weight cuff to distal UE while completing card sort task with use of green resistive clip to  cards and place to vertical target board at mirror  Pt in stance during task to improve core/trunk rotation, proximal strengthening/stability, & functional reaching  Pt demo difficulty with sustaining graded pressure on clip to successfully place card on velcro board  Neuro Re-Ed               Therapeutic Exercise  Pt tolerated 6 mins x 2 in prograde/retrograde motions, starting at 1 0 resistance seated on UBE with focus on increasing proximal UE strength, cardiopulmonary endurance, act tolerance, sustained grasp to inc indep and safety with ADL/IADL fxn and fxnl reaching tasks  Pt completed flexbar exercises with use of red flexbar, completed 3 sets x 10 reps pronation/supination exercises & twists  Pt then completed 30 reps with orange ring flexion to improve gross grasp, intrinsic strength and functional grasp  Manual          Self Care         Assessment: Tolerated treatment well  Pt able to tolerate standing for increased time during today's session, no LOB noted  Pt demo difficulties with grading pressure in fine motor tasks, demo improvement with massed practice   Patient would benefit from continued skilled OT  Plan: Continued skilled OT per POC      INTERVENTION COMMENTS:  Diagnosis: History of CVA (cerebrovascular accident) [Z86 73]  Precautions: Fall risk, pacemaker, No E-STIM   FOTO: Completed  Insurance: Payor: [address] / Plan: Wilian Posada / Product Type: Medicare HMO /   16 of 16 visits, PN due 1/12/2023  POC ends 01/23/2023

## 2022-12-19 ENCOUNTER — OFFICE VISIT (OUTPATIENT)
Dept: OCCUPATIONAL THERAPY | Facility: CLINIC | Age: 82
End: 2022-12-19

## 2022-12-19 ENCOUNTER — OFFICE VISIT (OUTPATIENT)
Dept: PHYSICAL THERAPY | Facility: CLINIC | Age: 82
End: 2022-12-19

## 2022-12-19 DIAGNOSIS — Z74.09 IMPAIRED FUNCTIONAL MOBILITY, BALANCE, GAIT, AND ENDURANCE: ICD-10-CM

## 2022-12-19 DIAGNOSIS — Z86.73 HISTORY OF CVA (CEREBROVASCULAR ACCIDENT): Primary | ICD-10-CM

## 2022-12-19 DIAGNOSIS — Z74.1 NEED FOR ASSISTANCE WITH PERSONAL CARE: ICD-10-CM

## 2022-12-19 NOTE — PROGRESS NOTES
Daily Note     Today's date: 2022  Patient name: Norman Adams  : 1940  MRN: [id number]  Referring provider: Felecia Freed PA-C  Dx:   Encounter Diagnosis     ICD-10-CM    1  History of CVA (cerebrovascular accident)  Z86 73       2  Impaired functional mobility, balance, gait, and endurance  Z74 09                      Subjective: Patient reports she didn't feel good after last session, states she was tired  She states she had a good weekend, was able to go to the store to do [employer] shopping and was there for 2 hours, and was able to  line for about 30 minutes  Objective: See treatment diary below      Assessment: Tolerated treatment well  Educated patient on purpose for using ankle weight on L LE to increase difficulty of moving LE to increase strength, was agreeable to using 2# AW for 2nd trial on TM  Increased difficulty with foot clearance on L LE with adduction during side steps  Demonstrated decreased synergistic movement with L UE during [address] compared to previous sessions  Continues to have difficulty with foot clearance of L LE when stepping off step, improved with repetitions  Patient demonstrated fatigue post treatment      Plan: Continue per plan of care  Precautions: HTN, a-fib, pacemaker      Manuals                                        Neuro Re-Ed         Standing marching         sidesteps SOLO No AW No AD 1/4 length x 3 laps C/S SOLO No AW No AD 1/4 length x 3 laps SOLO  No AW, no AD 1/4 lap x 3 laps   SOLO  2# AW on L LE, 1/4 lap x 3 laps   HKM SOLO No AW No AD  1/4 length x 3 laps C/S  SOLO  No AW, no AD  1/4 lap x 4 laps   SOLO  2# AW on L LE, 1/4 lap x 3 laps   Sit to stand with LE elevated STS w/B/L UE  X 10 v c  for positioning     STS w/No UE x 10 v c  for postioning and wt shifting   2 x 10, 1st set with feet equal no UE's  2nd set seated on foam and R foot forward Cues throughout session for equal foot positioning Sit to stand from high mat table, arms crossed x 10 reps   Step taps         backwards SOLO No AW No AD  1/4 length x 3 laps decreased LLE step length  SOLO  No AW, No AD  1/4 lap x 3 laps, cues for increased step length SOLO  No AW, no AD  1/4 lap x 3 laps SOLO 3# AW on LLE 1/4 length x 1 laps Patient c/o wt to heavy 2# Aw on LLE 1/4 length x 2 laps  V c  for increasing LLE step length    hurdles  SOLO No AW No AD   Low hurdles (7)  Fwd x 4 laps  Lat x 3 laps SOLO  No AW, No AD  Low hurdles (4) forward x 4 laps SOLO  Attempted 3# AW for 1 lap, no AW for 3 laps leading with L LE forward  SOLO 2# AW on LLE low hurdles (5) leading w/LLE  Fwd x 1 lap w/AW  Fwd x 1 lap without AW  Patient declined further hurdles stating I can not do it   SOLO 2# AW on L LE low hurdles (4)    Step up  SOLO No AW No AD 6" step  X 10 R  X 10 L      C/S to CGA with LLE lead  SOLO  No AW, 6-inch step, up and over curb negotiation  X 10 reps leading with R, x 2 reps leading with L (discontinued due patient request)   SOLO No AW 6" step up/over x 10 reps w/RLE lead  Increased difficulty this session with LLE amplitude SOLO, 2# AW on L LE  Up/over 4-inch (leading with L LE up and down) and 6-inch (2) leading with R LE up and down, x 2 laps, partially with HHA  1 LOB   Tall kneeling         foam                  Ther Ex         Sit to stand         nustep         treadmill          Reviewed and performed additions to HEP  Seated Toe Raises x 10 AAROM  Heel slides x 10  Side stepping x 2 laps  Standing marches x 10 ea                                                Ther Activity         Floor transfer         Bed mobility    Sit to supine and rolling with 3# AW on L LE, x 7 reps               Gait Training         Gait with SPC         With FWW in hallway         Gait training on TM SOLO TM B/L UE support    1 4 mph x 5 min  1 5 mph x 1 min    Seated rest break    1 5 mph x 5 min    Total 11 min SOLO  TM B/L UE     1 5 mph x 6 min 6 sec    Seated rest break    1 5 mph x 5 min    Total 11 min 6 sec   SOLO  TM with BUE support  1  5mph x 6 minutes    Seated rest break    1  7mph x 6 minutes    12 minutes total SOLO  TM with BUE support  1  7mph x 7 minutes    3# AW on L LE for 2nd trial 1 5 for 3 minutes, up to 1 7 for total of 6 minutes    13 minutes total SOLO TM w/B/L UE support    1 5 mph x 7 min  Patient requested lower speed due to very tired yesterday and could not get out of bed  3# AW on LLE 1 5 mph - 1 4 mph x 3 min  Patient states she is tired and can not do it  Needed encouragement to finish the 3 min SOLO TM with BUE support    1  5mph for 6 minutes    1  5mph with 2# AW on L LE for 6 minutes    Seated rest break between trials  staircase         Modalities                                      · Access Code: [email or web address]  URL: [email or web address]  Date: 10/13/2022  Prepared by: Mike Patino    Exercises   • Seated Hamstring Stretch with Strap - 1 x daily - 7 x weekly - 3 sets - 30 hold  • Seated Gastroc Stretch with Strap - 1 x daily - 7 x weekly - 3 sets - 30 hold  • Seated Long Arc Quad - 1 x daily - 5 x weekly - 3 sets - 10 reps  • Seated March - 1 x daily - 5 x weekly - 3 sets - 10 reps  • Seated Heel Raise - 1 x daily - 5 x weekly - 3 sets - 10 reps  • Seated Hip Adduction Isometrics with Ball - 1 x daily - 5 x weekly - 3 sets - 10 reps - 5 hold  • Seated Hip Abduction - 1 x daily - 5 x weekly - 3 sets - 10 reps  • Seated Toe Raise - 1 x daily - 7 x weekly - 2 sets - 10 reps  • Supine Ankle Pumps - 1 x daily - 5 x weekly - 2 sets - 10 reps  • Supine Heel Slide - 1 x daily - 5 x weekly - 2 sets - 10 reps  • Supine Bridge - 1 x daily - 3 x weekly - 2 sets - 10 reps  • Standing March with Counter Support - 1 x daily - 3 x weekly - 2 sets - 10 reps  • Side Stepping with Counter Support - 1 x daily - 3 x weekly - 5 sets

## 2022-12-19 NOTE — PROGRESS NOTES
Occupational Therapy Daily Note:    Today's date: 2022  Patient name: Nicolas Madrid  : 1940  MRN: [id number]  Referring provider: Manjinder Gutiérrez PA-C  Dx:   Encounter Diagnoses   Name Primary? • History of CVA (cerebrovascular accident) Yes   • Need for assistance with personal care        Subjective: "My writing is still not the best "    Objective: Pt engaged in skilled OT treatment session with focus on UE strengthening, UE endurance, FMC/GMC, FMS/GMS, body awareness and refined motor tasks, activity tolerance/endurance to increase engagement, endurance, tolerance, and independence with daily ADL and IADL tasks  CPT Code Minutes                                           Task Details        Therapeutic Activity  Pt started with in stance task with 2# DB transferring pegs using tweezers for functional tool use to peg board following a board design to improve FMC/FMS and grasp for sustained period  Pt then ambulated in clinic with [address] (and GB) holding objects in L hand for sustained grasp / L shoulder in IR, transferring to closet for dynamic balance reach and simulating grasp of objects from kitchen cabinets  Pt engaged in endurance writing copying paragraphs (in Uzbek) to improve grasp, endurance, line adherence, letter formation and maintaining this for prolonged period of time  Neuro Re-Ed               Therapeutic Exercise               Manual          Self Care         Assessment: Tolerated treatment well  Patient would benefit from continued skilled OT  Pt demo G ability to tolerate static standing for 50 min in session and functional mobility using a SPC today  Pt also tolerated 2# wrist weight for 35 min in session  Pt demo F tolerance to using functional tool use with about 10% dropped of pegs  Pt required increased time ~20 min to write a 4 sentence paragraph 2* decreased endurance and speed for writing  Provided to take home      Plan: Continued skilled OT per POC      INTERVENTION COMMENTS:  Diagnosis: History of CVA (cerebrovascular accident) [Z86 73]  Precautions: Fall risk, pacemaker, No E-STIM   FOTO: Completed  Insurance: Payor: [address]ira / Plan: Guillaume Moulton / Product Type: Medicare HMO /   1 of ___ visits, PN due 1/12/2023  POC ends 01/23/2023

## 2022-12-21 ENCOUNTER — OFFICE VISIT (OUTPATIENT)
Dept: PHYSICAL THERAPY | Facility: CLINIC | Age: 82
End: 2022-12-21

## 2022-12-21 ENCOUNTER — OFFICE VISIT (OUTPATIENT)
Dept: OCCUPATIONAL THERAPY | Facility: CLINIC | Age: 82
End: 2022-12-21

## 2022-12-21 DIAGNOSIS — Z86.73 HISTORY OF CVA (CEREBROVASCULAR ACCIDENT): Primary | ICD-10-CM

## 2022-12-21 DIAGNOSIS — Z74.09 IMPAIRED FUNCTIONAL MOBILITY, BALANCE, GAIT, AND ENDURANCE: ICD-10-CM

## 2022-12-21 DIAGNOSIS — Z74.1 NEED FOR ASSISTANCE WITH PERSONAL CARE: ICD-10-CM

## 2022-12-21 NOTE — PROGRESS NOTES
Daily Note     Today's date: 2022  Patient name: Wolf Chacon  : 1940  MRN: [id number]  Referring provider: Karla Wilkerson PA-C  Dx:   Encounter Diagnosis     ICD-10-CM    1  History of CVA (cerebrovascular accident)  Z86 73       2  Impaired functional mobility, balance, gait, and endurance  Z74 09                      Subjective: Patient reports she was tired after last time, but not as bad as a few sessions ago      Objective: See treatment diary below      Assessment: Tolerated treatment well  Continued utilization of 2# AW during session on L LE  Andrés Adolfo demonstrated improved ability to perform sit to stand during session with decreased cuing for equal placement of LE's prior to standing  Improved ability to lift L LE during hurdles, however during 1st trial had LOB due to L LE placement on top of jim and difficulty maintaining balance to correct  SPC (R side) was given to complete hurdles with patient reporting improved stability  Improved clearance with L LE on stepping down from 4-inch step with cuing to kick foot forwards  Patient demonstrated fatigue post treatment, especially with TM at end of session  Plan: Continue per plan of care        Precautions: HTN, a-fib, pacemaker      Manuals                                    Neuro Re-Ed        Standing marching        sidesteps SOLO  No AW, no AD 1/4 lap x 3 laps   SOLO  2# AW on L LE, 1/4 lap x 3 laps SOLO  2# AW on L LE, 1/4 lap x 3 laps   HKM SOLO  No AW, no AD  1/4 lap x 4 laps   SOLO  2# AW on L LE, 1/4 lap x 3 laps SOLO  2# AW on L LE, 1/4 lap x 4 laps   Sit to stand with LE elevated 2 x 10, 1st set with feet equal no UE's  2nd set seated on foam and R foot forward Cues throughout session for equal foot positioning  Sit to stand from high mat table, arms crossed x 10 reps    Step taps        backwards SOLO  No AW, No AD  1/4 lap x 3 laps, cues for increased step length SOLO  No AW, no AD  1/4 lap x 3 laps SOLO 3# AW on LLE 1/4 length x 1 laps Patient c/o wt to heavy 2# Aw on LLE 1/4 length x 2 laps  V c  for increasing LLE step length  SOLO  2# AW on L LE, 1/4 lap x 4 laps, cues for increased step length on L LE   hurdles SOLO  No AW, No AD  Low hurdles (4) forward x 4 laps SOLO  Attempted 3# AW for 1 lap, no AW for 3 laps leading with L LE forward  SOLO 2# AW on LLE low hurdles (5) leading w/LLE  Fwd x 1 lap w/AW  Fwd x 1 lap without AW  Patient declined further hurdles stating I can not do it  SOLO 2# AW on L LE low hurdles (4)  SOLO  2# AW on L LE, low hurdles (4) with SPC on R side  3 laps (therapist holding jim from moving), leading with L LE     Step up  SOLO  No AW, 6-inch step, up and over curb negotiation  X 10 reps leading with R, x 2 reps leading with L (discontinued due patient request)   SOLO No AW 6" step up/over x 10 reps w/RLE lead  Increased difficulty this session with LLE amplitude SOLO, 2# AW on L LE  Up/over 4-inch (leading with L LE up and down) and 6-inch (2) leading with R LE up and down, x 2 laps, partially with HHA  1 LOB SOLO, 2# AW on L LE  Up/over 4-inch (leading up/down with L LE), 6-inch (leading with R LE), and onto foam pad (1) x 3 laps, 1 LOB    Tall kneeling        foam        Bolster kick        Bean bag  from floor                        Ther Ex        Sit to stand     Sit to stand from chair x 15 reps   nustep        treadmill                                                    Ther Activity        Floor transfer        Bed mobility  Sit to supine and rolling with 3# AW on L LE, x 7 reps               Gait Training        Gait with SPC        With FWW in hallway        Gait training on TM SOLO  TM with BUE support  1  5mph x 6 minutes    Seated rest break    1  7mph x 6 minutes    12 minutes total SOLO  TM with BUE support  1  7mph x 7 minutes    3# AW on L LE for 2nd trial 1 5 for 3 minutes, up to 1 7 for total of 6 minutes    13 minutes total SOLO TM w/B/L UE support    1 5 mph x 7 min  Patient requested lower speed due to very tired yesterday and could not get out of bed  3# AW on LLE 1 5 mph - 1 4 mph x 3 min  Patient states she is tired and can not do it  Needed encouragement to finish the 3 min SOLO TM with BUE support    1  5mph for 6 minutes    1  5mph with 2# AW on L LE for 6 minutes    Seated rest break between trials  SOLO TM with BUE support     1  5mph for 5 minutes   Amb without AD     Across therapy clinic (40 feet) x 3 trials with no AD/HHA   staircase        Modalities                                  · Access Code: [email or web address]  URL: [email or web address]  Date: 10/13/2022  Prepared by: Reva Norton    Exercises   • Seated Hamstring Stretch with Strap - 1 x daily - 7 x weekly - 3 sets - 30 hold  • Seated Gastroc Stretch with Strap - 1 x daily - 7 x weekly - 3 sets - 30 hold  • Seated Long Arc Quad - 1 x daily - 5 x weekly - 3 sets - 10 reps  • Seated March - 1 x daily - 5 x weekly - 3 sets - 10 reps  • Seated Heel Raise - 1 x daily - 5 x weekly - 3 sets - 10 reps  • Seated Hip Adduction Isometrics with Ball - 1 x daily - 5 x weekly - 3 sets - 10 reps - 5 hold  • Seated Hip Abduction - 1 x daily - 5 x weekly - 3 sets - 10 reps  • Seated Toe Raise - 1 x daily - 7 x weekly - 2 sets - 10 reps  • Supine Ankle Pumps - 1 x daily - 5 x weekly - 2 sets - 10 reps  • Supine Heel Slide - 1 x daily - 5 x weekly - 2 sets - 10 reps  • Supine Bridge - 1 x daily - 3 x weekly - 2 sets - 10 reps  • Standing March with Counter Support - 1 x daily - 3 x weekly - 2 sets - 10 reps  • Side Stepping with Counter Support - 1 x daily - 3 x weekly - 5 sets

## 2022-12-21 NOTE — PROGRESS NOTES
Occupational Therapy Daily Note:    Today's date: 2022  Patient name: Sabine Lu  : 1940  MRN: [id number]  Referring provider: Harlan Jerez PA-C  Dx:   Encounter Diagnoses   Name Primary? • History of CVA (cerebrovascular accident) Yes   • Need for assistance with personal care        Subjective: "I love my hats with the balls on top!"  "My hand is tired and my leg " (walking with SPC / gripper)     Objective: Pt engaged in skilled OT treatment session with focus on UE strengthening, UE endurance, FMC/GMC, FMS/GMS, body awareness and refined motor tasks, activity tolerance/endurance to increase engagement, endurance, tolerance, and independence with daily ADL and IADL tasks  CPT Code Minutes                                           Task Details        Therapeutic Activity  Pt completed in stance activity with no AD, grasping shower curtain rings and opening, placing around theraband over head and snapping back together to improve FMC, B/L coordination skills, standing tolerance without AD  Neuro Re-Ed               Therapeutic Exercise  Pt engaged in functional mobility with SPC (and GB) grasping dowels with calibrated gripper on 2nd setting and walking ~15 ft one way to transfer dowels requiring sustained holds and grasp  At end of session, pt removed from dowel board crossing midline (horizontal adduction to abduction for placement into container)  Pt tolerated 2 min x 5 in prograde/retrograde motions at 1 3 resist (alternating 1 0 / 1 3 resist) on UBE with focus on increasing proximal UE strength, endurance, act tolerance and ROM to inc indep and safety with ADL/IADL fxn and fxnl reaching tasks  Manual          Self Care         Assessment: Tolerated treatment well  Patient would benefit from continued skilled OT  Pt demo excellent tolerance today with in stance activity using SPC and walking hand held assist to UBE   Pt did have 1 LOB when transferring dowels when walking with SPC although able to recorrect and right self to maintain balance  Pt continuing to have difficulty maintaining UBE resist at 1 3 requiring resetting of UBE intermittently 2* decreased UB strength/endurance  Pt also demo excellent in stance tolerance with overhead b/l FM task with no LOB  Pt did demo distal hand fatigue at end of session when transferring dowels with droppage 30-40% of time due to difficulty sustaining hold  Plan: Continued skilled OT per POC  UPGRADE tasks to in stance activities using SPC and/or hand held assist with short distances  Sustained grasping/strengthing, in stance b/l coordination tasks       INTERVENTION COMMENTS:  Diagnosis: History of CVA (cerebrovascular accident) [Z86 73]  Precautions: Fall risk, pacemaker, No E-STIM   FOTO: Completed  Insurance: Payor: [address] / Plan: Nicanor Duverney / Product Type: Medicare O /   2 of ___ visits, PN due 1/12/2023  POC ends 01/23/2023

## 2022-12-27 ENCOUNTER — CONSULT (OUTPATIENT)
Dept: MULTI SPECIALTY CLINIC | Facility: CLINIC | Age: 82
End: 2022-12-27

## 2022-12-27 VITALS
TEMPERATURE: 98.1 F | DIASTOLIC BLOOD PRESSURE: 61 MMHG | BODY MASS INDEX: 24.98 KG/M2 | WEIGHT: 141 LBS | HEART RATE: 82 BPM | SYSTOLIC BLOOD PRESSURE: 140 MMHG

## 2022-12-27 DIAGNOSIS — M21.372 FOOT DROP, LEFT FOOT: ICD-10-CM

## 2022-12-27 DIAGNOSIS — L85.3 XEROSIS CUTIS: ICD-10-CM

## 2022-12-27 DIAGNOSIS — Z86.73 HISTORY OF CVA (CEREBROVASCULAR ACCIDENT): ICD-10-CM

## 2022-12-27 DIAGNOSIS — L84 CALLUS OF FOOT: ICD-10-CM

## 2022-12-27 DIAGNOSIS — B35.1 ONYCHOMYCOSIS: Primary | ICD-10-CM

## 2022-12-27 RX ORDER — AMMONIUM LACTATE 12 G/100G
CREAM TOPICAL AS NEEDED
Qty: 385 G | Refills: 0 | Status: SHIPPED | OUTPATIENT
Start: 2022-12-27

## 2022-12-27 NOTE — PROGRESS NOTES
Podiatry Clinic  Pau Paz 80 y o  female MRN: [id number]  Encounter: [id number]      Assessment/Plan        Diagnoses and all orders for this visit:    Onychomycosis    Xerosis cutis  -     ammonium lactate (LAC-HYDRIN) 12 % cream; Apply topically as needed for dry skin    Foot drop, left foot  -     AFO Ankle Foot Orthotic Spring Wire Dorsiflexion Assist Calf Band    History of CVA (cerebrovascular accident)    Callus of foot       Plan:  • Patient was seen/examined  All questions and concerns addressed  • Patient with left sided drop foot with plantarflexed forefoot secondary to CVA in August  Order placed for AFO to assist with dorsiflexion at the left ankle  [facility] [id number] [employer] [facility]  • Prescription for Ammonium lactate 12% cream sent to patients pharmacy for xerosis of bilateral lower extremities  Instructed patient on use, and all questions and concerns addressed regarding medication  • Mycotic nails x2 debrided using large nail nipper, without incident  Nails of digits 2-5 bilaterally trimmed to normal length  • Hyperkeratotic lesion of left great toe trimmed with 15-blade to normal underlying epithelium, without incident  • RTC in 3 month(s)          Nail debridement     Date/Time 12/27/2022 10:44 AM     Performed by  Phil Franco DPM     Authorized by Phil Franco DPM      Universal Protocol   Consent: Verbal consent obtained  Procedure Details   Procedure Notes: Mycotic nails of bilateral hallux debrided with large nail nipper to normal length and thickness  Tolerated well by patient  No immediate complications observed  Dr Diandra Whitfield was present during this entire procedure       History of Present Illness     HPI: Pau Paz is am 81 y/o female patient presenting complaining of left foot pain secondary to deformity that she states has been present since her stroke in August  She states that after her stroke, she had significant left sided weakness and noticed that her left foot was "pointing down " She states that she has done several weeks of physical therapy, and although she regained strength in her left upper extremity, she noticed that her left foot was still weak and "stuck pointing down"  She also describes a throbbing pain in her left great toe, and believes that its from her toe dragging and hitting against the ground  She also complains of thickened and elongated nails, and states that she is no longer able to care for them herself due to her stroke  No other pedal complaints at this time  Review of Systems   Constitutional: Negative  HENT: Negative  Eyes: Negative  Respiratory: Negative  Cardiovascular: Negative  Gastrointestinal: Negative  Musculoskeletal: Left foot drop   Skin: dry skin of bilateral feet, thickened and elongated toenails  Neurological: Negative  Historical Information   Past Medical History:   Diagnosis Date   • A-fib ([facility] [address] )    • Anemia    • Arthritis    • Breast pain, right    • Chest pain on breathing    • Colon polyp    • Cough    • Diverticulosis    • Encounter for screening colonoscopy    • H/O sick sinus syndrome    • Heart murmur    • High cholesterol    • History of atrial fibrillation     Rate ontrolled  On xarelto 15mg (creatinine 50) Followed by Dr Amos Sandoval with Cardiology    • History of weight loss     Report loose fitting clothes  Weight stable (3lbs difference)  Last colo showed small tubular adenoma x2  Breast biopsy last showed fibrocystic changes  TSH wnl  Will check cbc, bmp, spep         • Hx of long term use of blood thinners    • Hypertension    • Irregular heart beat    • Pacemaker    • Preop examination    • Shortness of breath    • Viral bronchitis      Past Surgical History:   Procedure Laterality Date   • BREAST BIOPSY Right 08/17/2006    ultrasound guided Percutaneous Needle Core -Benign   • CATARACT EXTRACTION     • CATARACT EXTRACTION W/ INTRAOCULAR LENS IMPLANT, BILATERAL     • COLONOSCOPY     • COLONOSCOPY N/A 5/22/2018    Procedure: COLONOSCOPY;  Surgeon: Jodi Nascimento DO;  [facility]: [facility]; Service: Gastroenterology   • [place] / Wilber Blow / Denyce Phi     • LEG SURGERY Right     as a child after a fall   • MAMMO STEREOTACTIC BREAST BIOPSY RIGHT (ALL INC) Right 10/2014    benign   • IN CMBND ANTERPOST COLPORRAPHY W/CYSTO N/A 3/17/2016    Procedure: COLPORRHAPHY ANTERIOR POSTERIOR ;  Surgeon: Branden Hurtado MD;  [facility];  Service: Gynecology   • IN COLONOSCOPY FLX DX W/COLLJ SPEC WHEN PFRMD N/A 4/4/2019    Procedure: COLONOSCOPY;  Surgeon: Jodi Nascimento DO;  [facility]; Service: Gastroenterology   • IN COLPOPEXY VAGINAL EXTRAPERITONEAL APPROACH N/A 3/17/2016    Procedure: COLPOPEXY VAGINAL EXTRAPERITONEAL (VEC) ANTERIOR ;  Surgeon: Branden Hurtado MD;  [facility];  Service: Gynecology   • IN CYSTOURETHROSCOPY N/A 3/17/2016    Procedure: CYSTOSCOPY;  Surgeon: Branden Hurtado MD;  [facility];  Service: Gynecology   • IN ESOPHAGOGASTRODUODENOSCOPY TRANSORAL DIAGNOSTIC N/A 4/16/2018    Procedure: EGD AND COLONOSCOPY;  Surgeon: Jodi Nascimento DO;  [facility];   Service: Gastroenterology   • IN LAPAROSCOPY COLECTOMY PARTIAL W/ANASTOMOSIS Right 1/13/2022    Procedure: Diagnostic laparoscopy, laparscopic right colectomy;  Surgeon: Tree Nieves MD;  [facility];  Service: Colorectal   • IN SLING OPERATION STRESS INCONTINENCE N/A 3/17/2016    Procedure: INSERTION PUBOVAGINAL SLING SINGLE INCISION ;  Surgeon: Branden Hurtado MD;  [facility];  Service: Gynecology     Social History   Social History     Substance and Sexual Activity   Alcohol Use Never     Social History     Substance and Sexual Activity   Drug Use No     Social History     Tobacco Use   Smoking Status Never   Smokeless Tobacco Never     Family History:   Family History   Problem Relation Age of Onset   • Diabetes Mother    • Breast cancer Sister 48   • No Known Problems Father    • No Known Problems Maternal Grandmother    • No Known Problems Maternal Grandfather    • No Known Problems Paternal Grandmother    • No Known Problems Paternal Grandfather    • No Known Problems Daughter    • No Known Problems Son    • No Known Problems Sister    • No Known Problems Sister    • No Known Problems Brother    • No Known Problems Brother    • No Known Problems Sister    • No Known Problems Paternal Aunt    • No Known Problems Paternal Aunt    • No Known Problems Paternal Aunt    • No Known Problems Paternal Aunt        Meds/Allergies   (Not in a hospital admission)    Allergies   Allergen Reactions   • Other Itching     Bandaids   • Tape [Medical Tape] Itching       Objective     Current Vitals:   Blood Pressure: 140/61 (12/27/22 1001)  Pulse: 82 (12/27/22 1001)  Temperature: 98 1 °F (36 7 °C) (12/27/22 1001)  Temp Source: Temporal (12/27/22 1001)  Weight - Scale: 64 kg (141 lb) (12/27/22 1001)        /61 (BP Location: Right arm, Patient Position: Sitting, Cuff Size: Large)   Pulse 82   Temp 98 1 °F (36 7 °C) (Temporal)   Wt 64 kg (141 lb)   BMI 24 98 kg/m²       Lower Extremity Exam:    Vascular: DP pulses weakly palpable bilaterally  PT pulses palpable bilaterally  There is +1 lower extremity edema bilaterally  Capillary refill < 3 seconds bilaterally  Musculoskeletal: There is 5/5 strength throughout the right lower extremity, with 1/5 muscle strength on the left  Full ankle range of motion on the right with  Decreased ankle ROM on the left  well maintained subtalar range of motion  There is no tenderness over the to bilateral foot and ankle  There is foot deformities: drop foot on the left, with mild cavus       Biomechanical Exam of LE:  Hip ROM WNL Bilateral, external and internal rotation about equal at 45° b/l  Knee ROM WNL Bilateral, no hyperextension noted b/l, no genu varum/valgum noted b/l  Ankle dorsiflexion with knee extended is able to get pass vertical on right and limited on left  Ankle dorsiflexion with knee flexed is able to get pass vertical on right and limited on left  Malleolar positioning is WNL b/l  STJ ROM WNL b/l, heel inversion is approximately 20° on right and 20° on left; heel eversion is approximately 10° on right and 10° on left; neutral calcaneal stance position is 90°   1st ray ROM WNL b/l, able to dorsiflex/plantarflex b/l  Tibial varum is 0° b/l, Resting calcaneal stance position is vertical and approximately 90° on right and vertical and approximately 90° on left  Gait analysis: Shuffling gait with use of walker  Gross deformity noted: Left sided drop foot with equinus deformity and plantarflexion at the ankle joint  Deformity is non-fixed and manually reducible to rectus alignment  Neurological: Sensation to 5 07 [place] nylon filament: intact bilaterally      Vibratory sense to distal Foot intact bilaterally      Sharp/Dull sense is intact bilaterally    Dermatology: Skin Condition:  coolness, decreased hair growth, dryness, no abnormal pigmentation, no evidence of bleeding or bruising and no lesions noted     There is not evidence of macerated tissue between toe spaces  Nail Exam: onychomycosis of bilateral hallux toenails  Open ulcerations: No     Calluses: Yes, hyperkeratotic lesion at tip of left hallux

## 2023-01-05 ENCOUNTER — APPOINTMENT (OUTPATIENT)
Dept: PHYSICAL THERAPY | Facility: CLINIC | Age: 83
End: 2023-01-05

## 2023-01-05 ENCOUNTER — APPOINTMENT (OUTPATIENT)
Dept: OCCUPATIONAL THERAPY | Facility: CLINIC | Age: 83
End: 2023-01-05

## 2023-01-09 ENCOUNTER — OFFICE VISIT (OUTPATIENT)
Dept: OCCUPATIONAL THERAPY | Facility: CLINIC | Age: 83
End: 2023-01-09

## 2023-01-09 ENCOUNTER — EVALUATION (OUTPATIENT)
Dept: PHYSICAL THERAPY | Facility: CLINIC | Age: 83
End: 2023-01-09

## 2023-01-09 DIAGNOSIS — Z86.73 HISTORY OF CVA (CEREBROVASCULAR ACCIDENT): ICD-10-CM

## 2023-01-09 DIAGNOSIS — Z86.73 HISTORY OF CVA (CEREBROVASCULAR ACCIDENT): Primary | ICD-10-CM

## 2023-01-09 DIAGNOSIS — Z74.09 IMPAIRED FUNCTIONAL MOBILITY, BALANCE, GAIT, AND ENDURANCE: Primary | ICD-10-CM

## 2023-01-09 NOTE — PROGRESS NOTES
Re-Evaluation        Today's date: 2023  Patient name: Homa Whitt  : 1940  MRN: [id number]  Referring provider: Felix Rollins PA-C  Dx:   Encounter Diagnosis     ICD-10-CM    1  Impaired functional mobility, balance, gait, and endurance  Z74 09       2  History of CVA (cerebrovascular accident)  Z86 73                       Assessment  23: Lola Mcbride has attended 22 sessions of physical therapy for CVA with L sided weakness  She returns to therapy after 2 5 weeks off for the holidays and lack of transportation  She continues to make progress toward all long term goals, however may not be demonstrating full progress from lack of therapy for 2 5 weeks prior to re-assessment  Most outcome measures continue to demonstrate statistically significant improvements  From last progress note, 5xSTS has improved from 20 9 seconds to 17 0 seconds, TUG improved from 23 0 seconds to 20 0 seconds with FWW and from 24 0 seconds to 21 3 seconds with SPC, self-selected gait speed with SPC improved from 0 42m/s to 0 50m/s  6MWT improved slightly from 650 feet to 670 feet, which may have showed increased improvement prior to 2 5 week break from therapy  FGA improved from  to 10/30 with improvements demonstrated with stair negotiation and ambulation with horizontal head turns  Some progress may be limited/floor effect noted with FGA due to requiring assistive device during ambulation  Lola Mcbride continues to demonstrate progress with mobility tasks, endurance, functional mobility, independence, decreasing caregiver burden, improving gait, improving balance, and decreasing fall risk  She continues to be at increased risk of falls from 5xSTS score >15 seconds, TUG time >13 5 seconds, gait speed less than 1 0m/s, and FGA score <22/30  She continues to be in a limited community ambulator category due to decreased ambulation speed, with increased risk of hospitalization and assistance with ADLs/IADLS   She continues to be in the sub-acute phase of CVA where most functional gains and neuroplasticity improvements are greatest  Loly Burgess would benefit from continued physical therapy to improve functional mobility, decrease fall risk, improve balance, maximize function, improve endurance, and improve gait  Assessment details: Patient presents to outpatient physical therapy s/p CVA on 22, post inpatient rehabilitation and SNF stay  She presents with impaired L LE strength,  Increased L LE tone and spasticity, decreased endurance, decreased balance, increased fall risk, and requiring assistance for mobility tasks (gait, transfers, bed mobility)  She presents with increased fall risk from increased time to complete 5xSTS of 27 6 seconds, above cut-off score of 15 seconds  She also presents with decreased gait speed of 0 30 m/s, below cut-off score of 1 0m/s for increased risk of falls, and also is below cut-off score for needing assist with ADL's and IADLS (3 4T/B cut-off score), is more likely to be hospitalized (0 6m/s cut-off), and is considered a household walker  She is also considered a fall risk with TUG score of 54 4 seconds, above cut-off score of 13 5 seconds for fall risk  She demonstrates decreased LE strength and endurance from 5xSTS of 27 65 seconds (with min A due to retropulsion)  She also demonstrates decreased functional mobility and endurance with 2MWT of 110 feet, and able to ambulate 130 feet in total before requiring rest break  She would benefit from skilled physical therapy to decrease fall risk, improve balance, improve LE strength and endurance, improve cardiovascular endurance, improve functional mobility, decreased caregiver burden and maximize function  Impairments: abnormal gait, abnormal muscle tone, abnormal or restricted ROM, activity intolerance, impaired balance, impaired physical strength, pain with function and safety issue  Understanding of Dx/Px/POC: good   Prognosis: good    Goals  ST  Pt will improve gait speed to at least 0 40 m/s with least restrictive device within 4 weeks needed to improve safety with ambulation  MET  2  Pt will improve TUG time by at least 5 seconds to decrease fall risk and improve mobility in 4 weeks MET  3  Pt will improve 5xSTS score by at least 5 seconds within 4 weeks needed to reduce fall risk  Not met (able to do without assist)  4  Pt will improve 6MWT to at least 250 feet in 4 week to improve functional mobility and endurance in 4 weeks MET  5  Pt will improve bed mobility and transfers to [facility] to min A at most in 4 weeks to improve functional mobility and decrease caregiver burden in 4 weeks  MET     NEW STG (completed by 8 weeks, 2022)  1  Pt will improve gait speed to at least 0 6m/s with least restrictive device within 8 weeks needed to improve safety with ambulation MET at times  2  Pt will improve TUG score to <23 seconds in 8 weeks to decrease fall risk and improve functional mobility MET  3  Pt will improve 5xSTS to at least 20 seconds with SPV within 8 weeks to decrease fall risk PROGRESSING  4  Pt will improve 6MWT to at least 600 feet with LRAD in 8 weeks to improve functional mobility and endurance MET    LT  Pt will improve gait speed to at least 0 70 m/s with least restrictive device within 12 weeks needed to improve safety with ambulation  MET during 6MWT  2  Pt will improve TUG score to <20 seconds in 12 weeks to decrease fall risk and improve functional mobility ALMOST MET  3  Pt will improve 5xSTS score to <15 sec with SPV within 12 weeks needed to reduce fall risk  PROGRESSING  4  Pt will improve 6MWT to at least 700 feet in 12 weeks to improve functional moblity ALMOST MET  5  Pt will demonstrate independence with HEP within 12 weeks  MET    NEW GOALS (2023)  ST  Pt will improve self-selected gait speed to at least 0 60m/s with least restrictive AD within 4 weeks to improve gait and decrease fall risk  2   Pt will improve TUG score to at least <18 seconds in 4 weeks to decrease fall risk and improve mobility  3  Pt will improve 5xSTS to <15 sec with UE's in 4 weeks to decrease fall risk and improve LE strength and endurance  4  Pt will improve 6MWT to at least 720 feet in 4 weeks to improve functional mobility and endurance    LT  Pt will improve fast gait speed to at least 0 85m/s within 8 weeks to decrease fall risk and improve mobility  2  Pt will improve TUG score to at least 15 seconds to decrease risk of falls and improve mobility in 8 weeks  3  Pt will improve 5xSTS to be able to complete without UE support in <15 seconds to decrease fall risk and improve LE strength and endurance in 8 weeks  4  Pt will improve 6MWT to at least 800 feet in 8 weeks to improve endurance and functional mobility  5  Pt will improve FGA to at least 15/30 in 8 weeks to improve balance  6  Pt will be independent with HEP in 8 weeks    Plan  Plan details: 2x/week per patient tolerance  Patient would benefit from: skilled physical therapy  Planned therapy interventions: balance, balance/weight bearing training, neuromuscular re-education, motor coordination training, orthotic fitting/training, orthotic management and training, patient education, coordination, strengthening, stretching, flexibility, therapeutic activities, therapeutic training, therapeutic exercise, gait training, transfer training and home exercise program  Duration in weeks: 8  Plan of Care beginning date: 2023  Plan of Care expiration date: 3/6/2023  Treatment plan discussed with: patient        Subjective Evaluation    History of Present Illness  23: Yolanda Campuzano reports she still requires some help with getting in and bed when tired  Walking more at home and standing better  Stairs doing good, needs to hold onto railing  Need to work on balance more  Leg is moving better, sometimes feels heavy but doing better  22: Patient reports that she is doing well   She states that therapy is helping  She states that her balance is doing better  She states that she can stand better with the walker  She reports that she needs less help getting out of bed  She reports that her left leg is moving better and doesn't feel as heavy as before  22: She states that she is making good improvements  She states that she has difficulty with getting out and getting into bed  She states she has a difficult time getting up from a chair  She reports that she needs help getting in and out of bed  She reports that she needs someone with her (home attendant or her son), especially to get up at night  Mechanism of injury: She usually lives alone, now her son is off from work to help her  She states she needs help to move from the bed  She states that she is walking with a walker at home  She states she sometimes uses the wheelchair in the house, but mainly when leaving the house  She has been staying at her home, but went to her son's house yesterday (bathroom 2nd floor)  She states she has difficulty with going down the stairs  She states she has pain in the knee down and the foot feels heavy and asleep on the L side  She states she wants to walk better with the walker  22 with contracted and weak LUE, LLE weakness, L facial droop, mild dysarthria and left sided sensory deficits  CTA showed near occlusive thormbus at R MCA bifurcation, as well as mild beading of the mid to distal ICAs suspicious for mild fibromuscular dysplasia, and mild atherosclerotic disease at bilateral distal carotid arteries  [facility] discharge on 22, discharged to [facility] discharged last week         Pain  Current pain ratin  At best pain ratin  At worst pain ratin  Location: L LE  Quality: cramping  Relieving factors: rest    Social Support  Steps to enter house: no  Stairs in house: no   Lives in: apartment  Lives with: alone    Treatments  Discharged from (in last 30 days): skilled nursing facility  Patient Goals  Patient goal: walk with the walker and get better        Objective       Manual Muscle Testing - Hip Left Right   Flexion 2+ 4+   Extension NT NT   Abduction NT NT   23: L LE Flexion 3/5, Abduction 2+/5,     Manual Muscle Testing - Knee Left Right   Flexion 3+ 4+   Extension 3+ 4+   23: L LE flexion 4-, extension 4-    Manual Muscle Testing - Ankle Left Right   Doriflexion 2- 4+   Plantarflexion NT NT         Coordination Left Right   Alt toe tapping unable Not tested       Sensation Left Right   Kinesthesia NT NT   Light Touch intact intact       Muscle Tone (Modified Sada Scale**) Left Right   Hamstring 2 0   Gastroc 3 0   Quad 2 0   **  0 = no increase in muscle tone  1 = slight increase in muscle tone, minimal resistance at the end of ROM when moved  1+ = minimal resistance throughout the remainder (less than half) of ROM when moved  2 =  David increase in muscle tone through most of the ROM, but still moved easily  3 = considerable increase in muscle tone, movement difficult  4 = affects parts rigid       Balance Test Initial Eval 22   5x Sit to Stand: 27 6 with UE's on FWW, min A 31 22 seconds with UE's, SPV 20 9 second with UE's and SPV 17 03 seconds with UE's and SPV   TU 4 seconds with FWW 28 3 seconds with FWW and SPV 23 09 seconds with FWW and SPV    24 06 with SPC and CGA 20 09 sec with FWW and SPV    21 34 sec with SPC and CGA   Gait Speed:  0 30m/s with FWW and CGA 0 46 m/s with FWW and SPV 0 42m/s with SPC SSGS    0 43m/s with FWW SSGS    0 71 m/s during 6MWT (with FWW) fast gait speed 0 50m/s with SPC SSGS          0 73m/s during 6MWT (with FWW) fast gait speed   2 Minute Walk Test: 110 feet with FWW and  feet with FWW and  feet with FWW and SPV NT   6 Minute Walk Test: 130 feet in 2min 39 seconds 454 feet with FWW and SPV, completed full 6 minutes 650 feet with FWW in full 6 minutes 670 feet with FWW, full 6 minutes   FGA: NT  8/30, with [facility] 10/30 with [facility]         Transfers  11/7/22 12/5   Sit To Stand Min Assist SPV SPV   W/C To Bed Mod Assist With FWW, SPV SPV   Sit To Supine Mod Assist Min A at L LE SPV with vc's   Roll Min Assist Min A SPV, with vc's to push through LE         Gait Assessment: Decreased gait speed, decreased step length L>R, decreased heel strike bilaterally  Slightly flexed posture of L LE during swing and initial contact     Precautions: HTN, a-fib, pacemaker      Manuals 12/9 12/12 12/14 12/19 12/21 1/9                                       Neuro Re-Ed         Standing marching         sidesteps SOLO  No AW, no AD 1/4 lap x 3 laps   SOLO  2# AW on L LE, 1/4 lap x 3 laps SOLO  2# AW on L LE, 1/4 lap x 3 laps    HKM SOLO  No AW, no AD  1/4 lap x 4 laps   SOLO  2# AW on L LE, 1/4 lap x 3 laps SOLO  2# AW on L LE, 1/4 lap x 4 laps SOLO  3# AW on L LE, 1/4 lap x 3 laps   Sit to stand with LE elevated 2 x 10, 1st set with feet equal no UE's  2nd set seated on foam and R foot forward Cues throughout session for equal foot positioning  Sit to stand from high mat table, arms crossed x 10 reps     Step taps         backwards SOLO  No AW, No AD  1/4 lap x 3 laps, cues for increased step length SOLO  No AW, no AD  1/4 lap x 3 laps SOLO 3# AW on LLE 1/4 length x 1 laps Patient c/o wt to heavy 2# Aw on LLE 1/4 length x 2 laps  V c  for increasing LLE step length  SOLO  2# AW on L LE, 1/4 lap x 4 laps, cues for increased step length on L LE    hurdles SOLO  No AW, No AD  Low hurdles (4) forward x 4 laps SOLO  Attempted 3# AW for 1 lap, no AW for 3 laps leading with L LE forward  SOLO 2# AW on LLE low hurdles (5) leading w/LLE  Fwd x 1 lap w/AW  Fwd x 1 lap without AW  Patient declined further hurdles stating I can not do it  SOLO 2# AW on L LE low hurdles (4)  SOLO  2# AW on L LE, low hurdles (4) with SPC on R side   3 laps (therapist holding jim from moving), leading with L LE      Step up  SOLO  No AW, 6-inch step, up and over curb negotiation  X 10 reps leading with R, x 2 reps leading with L (discontinued due patient request)   SOLO No AW 6" step up/over x 10 reps w/RLE lead  Increased difficulty this session with LLE amplitude SOLO, 2# AW on L LE  Up/over 4-inch (leading with L LE up and down) and 6-inch (2) leading with R LE up and down, x 2 laps, partially with HHA  1 LOB SOLO, 2# AW on L LE  Up/over 4-inch (leading up/down with L LE), 6-inch (leading with R LE), and onto foam pad (1) x 3 laps, 1 LOB     Tall kneeling         foam         Bolster kick         Bean bag  from floor                           Ther Ex         Sit to stand     Sit to stand from chair x 15 reps 3 x 5 with UE's from chair   nustep         treadmill                                                          Ther Activity         Floor transfer         Bed mobility  Sit to supine and rolling with 3# AW on L LE, x 7 reps     Sit <--> supine x 4 reps, rolling each direction with pushing through LE    Supine L LE heel slides x 15 reps, hip abduction/adduction x 20 reps            Gait Training      6MWT 670 feet   Gait with SPC         With FWW in hallway         Gait training on TM SOLO  TM with BUE support  1  5mph x 6 minutes    Seated rest break    1  7mph x 6 minutes    12 minutes total SOLO  TM with BUE support  1  7mph x 7 minutes    3# AW on L LE for 2nd trial 1 5 for 3 minutes, up to 1 7 for total of 6 minutes    13 minutes total SOLO TM w/B/L UE support    1 5 mph x 7 min  Patient requested lower speed due to very tired yesterday and could not get out of bed  3# AW on LLE 1 5 mph - 1 4 mph x 3 min  Patient states she is tired and can not do it  Needed encouragement to finish the 3 min SOLO TM with BUE support    1  5mph for 6 minutes    1  5mph with 2# AW on L LE for 6 minutes    Seated rest break between trials  SOLO TM with BUE support     1  5mph for 5 minutes    Amb without AD     Across therapy clinic (40 feet) x 3 trials with no AD/HHA staircase      Up and over staircase x 3 reps with BUE support, reciprocal pattern   Education      Progress with therapy, HEP (heel slides and angels) bed mobility techniques, progress with therapy, POC, progress with outcome measures                                · Access Code: [email or web address]  URL: [email or web address]  Date: 10/13/2022  Prepared by: Amina Edwards      Exercises   • Seated Hamstring Stretch with Strap - 1 x daily - 7 x weekly - 3 sets - 30 hold  • Seated Gastroc Stretch with Strap - 1 x daily - 7 x weekly - 3 sets - 30 hold  • Seated Long Arc Quad - 1 x daily - 5 x weekly - 3 sets - 10 reps  • Seated March - 1 x daily - 5 x weekly - 3 sets - 10 reps  • Seated Heel Raise - 1 x daily - 5 x weekly - 3 sets - 10 reps  • Seated Hip Adduction Isometrics with Ball - 1 x daily - 5 x weekly - 3 sets - 10 reps - 5 hold  • Seated Hip Abduction - 1 x daily - 5 x weekly - 3 sets - 10 reps  • Seated Toe Raise - 1 x daily - 7 x weekly - 2 sets - 10 reps  • Supine Ankle Pumps - 1 x daily - 5 x weekly - 2 sets - 10 reps  • Supine Heel Slide - 1 x daily - 5 x weekly - 2 sets - 10 reps  • Supine Bridge - 1 x daily - 3 x weekly - 2 sets - 10 reps  • Standing March with Counter Support - 1 x daily - 3 x weekly - 2 sets - 10 reps  • Side Stepping with Counter Support - 1 x daily - 3 x weekly - 5 sets

## 2023-01-09 NOTE — PROGRESS NOTES
Occupational Therapy Daily Note:    Today's date: 2023  Patient name: Hampton Schilder  : 1940  MRN: [id number]  Referring provider: Eddie Price PA-C  Dx:   Encounter Diagnosis   Name Primary? • History of CVA (cerebrovascular accident) Yes       Subjective: "I like red and black"    Objective: Pt engaged in skilled OT treatment session with focus on UE NMR, UE strengthening, UE endurance, FMC/GMC and FMS/GMS and standing tolerance to increase engagement, endurance, tolerance, and independence with daily ADL and IADL tasks  CPT Code Minutes                                           Task Details        Therapeutic Activity 45 Pt engaged in [place] activity in stance with focus to standing tolerance, UE strength/UE endurance, prehension patterns, FMC/FME/dexterity skills  Provided squigz on tabletop, pt req to reach crossing midline with 1 0 CW donned to place to vertical target  Pt then req to weave shoestring and yarn between for inc pressure grading and dexterity demand  Pt complete with min difficulty, able to remain in stance approx 20 min  Completing remainder of activity in seated, removing from tabletop with green resistive clip  Pt complete with increased time, FM fatigue noted with perisist use of resistive clip     Pt engaged in snowglobe craft activity with focus to bimanual skills, fine motor precision, visual motor integration, fine motor strength and endurance  Provided stimulus, pt req to trace simple shapes followed by cutting against plastic resist, and assemble to create snowglobe ornament  Pt complete without noted difficulty  Neuro Re-Ed               Therapeutic Exercise 10 Pt tolerated 5 min x 2 (prograde/retrograde motions at 1 0 resist)  on UBE with focus on increasing proximal UE strength, endurance, act tolerance and ROM to inc indep and safety with ADL/IADL fxn and fxnl reaching tasks                  Manual          Self Care           Assessment: Tolerated treatment well  Pt tolerated fxnl reach and dexterity tasks in stance well  Decreased standing tolerance and FM endurance noted with perisistence through activities this date, however, pt able to persist through session with corie TAO overall  Patient would benefit from continued skilled OT      Plan: Continued skilled OT per POC  INTERVENTION COMMENTS:  Diagnosis: History of CVA (cerebrovascular accident) [Z86 73]  Precautions: Fall risk, pacemaker, No E-STIM   FOTO: Completed  Insurance: Payor: Nicole Chen MEDICARE [facility] REP / Plan: Frannie Teague / Product Type: Medicare HMO /   3 of ___ visits, PN due 2/9/2023  POC ends 01/23/2023

## 2023-01-10 DIAGNOSIS — R33.9 URINARY RETENTION: ICD-10-CM

## 2023-01-10 DIAGNOSIS — E78.5 HYPERLIPIDEMIA LDL GOAL <100: ICD-10-CM

## 2023-01-10 DIAGNOSIS — I10 ESSENTIAL HYPERTENSION: ICD-10-CM

## 2023-01-10 DIAGNOSIS — K21.9 GASTROESOPHAGEAL REFLUX DISEASE WITHOUT ESOPHAGITIS: Chronic | ICD-10-CM

## 2023-01-10 NOTE — TELEPHONE ENCOUNTER
Patient called to request that the 4 medication be delivered to her son's Neri Ordonez'  address:  [address]  [place], [address]

## 2023-01-11 ENCOUNTER — OFFICE VISIT (OUTPATIENT)
Dept: PHYSICAL THERAPY | Facility: CLINIC | Age: 83
End: 2023-01-11

## 2023-01-11 ENCOUNTER — OFFICE VISIT (OUTPATIENT)
Dept: OCCUPATIONAL THERAPY | Facility: CLINIC | Age: 83
End: 2023-01-11

## 2023-01-11 DIAGNOSIS — Z86.73 HISTORY OF CVA (CEREBROVASCULAR ACCIDENT): Primary | ICD-10-CM

## 2023-01-11 DIAGNOSIS — Z74.1 NEED FOR ASSISTANCE WITH PERSONAL CARE: ICD-10-CM

## 2023-01-11 DIAGNOSIS — Z74.09 IMPAIRED FUNCTIONAL MOBILITY, BALANCE, GAIT, AND ENDURANCE: ICD-10-CM

## 2023-01-11 RX ORDER — LOSARTAN POTASSIUM 50 MG/1
50 TABLET ORAL DAILY
Qty: 90 TABLET | Refills: 3 | Status: SHIPPED | OUTPATIENT
Start: 2023-01-11

## 2023-01-11 RX ORDER — PANTOPRAZOLE SODIUM 40 MG/1
40 TABLET, DELAYED RELEASE ORAL
Qty: 90 TABLET | Refills: 3 | Status: SHIPPED | OUTPATIENT
Start: 2023-01-11

## 2023-01-11 RX ORDER — ROSUVASTATIN CALCIUM 10 MG/1
5 TABLET, COATED ORAL DAILY
Qty: 45 TABLET | Refills: 3 | Status: SHIPPED | OUTPATIENT
Start: 2023-01-11

## 2023-01-11 RX ORDER — TAMSULOSIN HYDROCHLORIDE 0.4 MG/1
0.4 CAPSULE ORAL
Qty: 90 CAPSULE | Refills: 3 | Status: SHIPPED | OUTPATIENT
Start: 2023-01-11

## 2023-01-11 NOTE — PROGRESS NOTES
Daily Note     Today's date: 2023  Patient name: Keane Olszewski  : 1940  MRN: [id number]  Referring provider: Graciela Milan PA-C  Dx:   Encounter Diagnosis     ICD-10-CM    1  History of CVA (cerebrovascular accident)  Z86 73       2  Impaired functional mobility, balance, gait, and endurance  Z74 09           Start Time: 1000  Stop Time: 1100  Total time in clinic (min): 60 minutes    Subjective: Patient reports that she has some difficulty getting up from a chair at home  Objective: See treatment diary below      Assessment: Tolerated treatment fair  Luis Antonio Salvage participated in skilled PT session focused on balance, gait, and endurance  Patient continues to be challenged with lifting LLE up over obstacles having to adjust hurdles to canes and, getting up from sitting in chairs requiring min Ax1 at times  Patient demonstrates decreased wt shifting onto LLE this session requiring verbal and tactile cues for shifting when stepping over obstacles  Patient did demonstrate improved endurance this session requiring no rest break during TM with added incline  Patient would continue to benefit from skilled PT interventions to address deficits with balance, gait, and endurance  Patient demonstrated fatigue post treatment      Plan: Continue per plan of care        Precautions: HTN, a-fib, pacemaker    Manuals                                        Neuro Re-Ed         Standing marching         sidesteps    SOLO  2# AW on L LE, 1/4 lap x 3 laps SOLO  2# AW on L LE, 1/4 lap x 3 laps    HKM SOLO 3# AW LLE 1/4 length  X 4 laps   SOLO  2# AW on L LE, 1/4 lap x 3 laps SOLO  2# AW on L LE, 1/4 lap x 4 laps SOLO  3# AW on L LE, 1/4 lap x 3 laps   Sit to stand with LE elevated  Cues throughout session for equal foot positioning  Sit to stand from high mat table, arms crossed x 10 reps     Step taps         backwards SOLO 3# LLE AW 1/4 length  X 4 laps SOLO  No AW, no AD  1/4 lap x 3 laps SOLO 3# AW on LLE 1/4 length x 1 laps Patient c/o wt to heavy 2# Aw on LLE 1/4 length x 2 laps  V c  for increasing LLE step length  SOLO  2# AW on L LE, 1/4 lap x 4 laps, cues for increased step length on L LE    hurdles SOLO 3# AW LLE 4" jim (1) and 6" hurdles (3)    Fwd x 1 lap difficulty lifting LLE    3# AW LLE  Over canes (4)  Fwd x 3 laps alt lead Lead     Lat x 3 laps  Increased difficulty lifting LLE over canes landing on cane SOLO  Attempted 3# AW for 1 lap, no AW for 3 laps leading with L LE forward  SOLO 2# AW on LLE low hurdles (5) leading w/LLE  Fwd x 1 lap w/AW  Fwd x 1 lap without AW  Patient declined further hurdles stating I can not do it  SOLO 2# AW on L LE low hurdles (4)  SOLO  2# AW on L LE, low hurdles (4) with SPC on R side   3 laps (therapist holding jim from moving), leading with L LE      Step up SOLO 3# LLE AW 4" step  Fwd x 10 ea  Min Ax1 with LLE SOLO  No AW, 6-inch step, up and over curb negotiation  X 10 reps leading with R, x 2 reps leading with L (discontinued due patient request)   SOLO No AW 6" step up/over x 10 reps w/RLE lead  Increased difficulty this session with LLE amplitude SOLO, 2# AW on L LE  Up/over 4-inch (leading with L LE up and down) and 6-inch (2) leading with R LE up and down, x 2 laps, partially with HHA  1 LOB SOLO, 2# AW on L LE  Up/over 4-inch (leading up/down with L LE), 6-inch (leading with R LE), and onto foam pad (1) x 3 laps, 1 LOB     Tall kneeling         foam         Bolster kick         Bean bag  from floor                           Ther Ex         Sit to stand From chair w/arms x 10  CGA  Difficulty scooting forward and putting wt through LLE  Put airex on chair with improved STS    Sit to stand from chair x 15 reps 3 x 5 with UE's from chair   nustep         treadmill                                                          Ther Activity         Floor transfer         Bed mobility  Sit to supine and rolling with 3# AW on L LE, x 7 reps     Sit <--> supine x 4 reps, rolling each direction with pushing through LE    Supine L LE heel slides x 15 reps, hip abduction/adduction x 20 reps            Gait Training      6MWT 670 feet   Gait with SPC         With FWW in hallway         Gait training on TM SOLO  TM No AW  B/L UE Support    1 6 mph 3% incline x 10 min SOLO  TM with BUE support  1  7mph x 7 minutes    3# AW on L LE for 2nd trial 1 5 for 3 minutes, up to 1 7 for total of 6 minutes    13 minutes total SOLO TM w/B/L UE support    1 5 mph x 7 min  Patient requested lower speed due to very tired yesterday and could not get out of bed  3# AW on LLE 1 5 mph - 1 4 mph x 3 min  Patient states she is tired and can not do it  Needed encouragement to finish the 3 min SOLO TM with BUE support    1  5mph for 6 minutes    1  5mph with 2# AW on L LE for 6 minutes    Seated rest break between trials  SOLO TM with BUE support     1  5mph for 5 minutes    Amb without AD     Across therapy clinic (40 feet) x 3 trials with no AD/HHA    staircase      Up and over staircase x 3 reps with BUE support, reciprocal pattern   Education      Progress with therapy, HEP (heel slides and angels) bed mobility techniques, progress with therapy, POC, progress with outcome measures                                  · Access Code: [email or web address]  URL: [email or web address]  Date: 10/13/2022  Prepared by: Sintia Joe    Exercises   • Seated Hamstring Stretch with Strap - 1 x daily - 7 x weekly - 3 sets - 30 hold  • Seated Gastroc Stretch with Strap - 1 x daily - 7 x weekly - 3 sets - 30 hold  • Seated Long Arc Quad - 1 x daily - 5 x weekly - 3 sets - 10 reps  • Seated March - 1 x daily - 5 x weekly - 3 sets - 10 reps  • Seated Heel Raise - 1 x daily - 5 x weekly - 3 sets - 10 reps  • Seated Hip Adduction Isometrics with Ball - 1 x daily - 5 x weekly - 3 sets - 10 reps - 5 hold  • Seated Hip Abduction - 1 x daily - 5 x weekly - 3 sets - 10 reps  • Seated Toe Raise - 1 x daily - 7 x weekly - 2 sets - 10 reps  • Supine Ankle Pumps - 1 x daily - 5 x weekly - 2 sets - 10 reps  • Supine Heel Slide - 1 x daily - 5 x weekly - 2 sets - 10 reps  • Supine Bridge - 1 x daily - 3 x weekly - 2 sets - 10 reps  • Standing March with Counter Support - 1 x daily - 3 x weekly - 2 sets - 10 reps  • Side Stepping with Counter Support - 1 x daily - 3 x weekly - 5 sets

## 2023-01-11 NOTE — PROGRESS NOTES
Occupational Therapy Daily Note:    Today's date: 2023  Patient name: John Webster  : 1940  MRN: [id number]  Referring provider: Mitra Marlow PA-C  Dx:   Encounter Diagnoses   Name Primary? • History of CVA (cerebrovascular accident) Yes   • Need for assistance with personal care        Subjective: "I am getting so much better since I have been here "    Objective: Pt engaged in skilled OT treatment session with focus on UE NMR, UE strengthening, UE endurance, FMC/GMC and FMS/GMS and standing tolerance to increase engagement, endurance, tolerance, and independence with daily ADL and IADL tasks  CPT Code Minutes                                           Task Details        Therapeutic Activity               Neuro Re-Ed  Concluded with [facility] demands using resistance band tying knots and removing for b/l coordination  Educated pt on HEP of tying knots in resistance band at home  Pt "crumbling" resistance band in L hand for in hand skills/manipulation, rec to continue at home  Therapeutic Exercise  Pt tolerated 5 min x 2 (prograde/retrograde motions at 1 0 resist)  on UBE with focus on increasing proximal UE strength, endurance, act tolerance and ROM to inc indep and safety with ADL/IADL fxn and fxnl reaching tasks  Manual          Self Care  Seated edge of mat, simulated LB dressing for carryover of strategies learned from previous session of donning through LLE first before RLE  Trialed some compensations with LLE movement of placing over top of R shin to ease donning process 2* limitations in ROM of dorsiflexion/plantar flexion  Practiced x 6 for follow through  Upgraded to tying additional knot to R side for looping through RLE as well  Trialed long shoe horn with no assist for RLE and about Min-Mod assist for LLE 2* tone  Educated pt on donning jacket through LUE first then RUE to increase independence with UB ADLs               Assessment: Tolerated treatment well  Patient would benefit from continued skilled OT  Pt charlotte CLARKE tolerance to UBE even though pt did report slight pain in L shoulder anterior GH joint today, no pain with movement  Pt charlotte CLARKE understanding of self-care dressing strategies donning through L side first before R and improved with placing of L foot over top of R shin as compensation  Pt presents with significant ROM limitations in dorsi/plantar flexion impacting indep with donning pants/shoes  Pt charlotte CLARKE understanding of [address] HEP  Plan: Continued skilled OT per POC      INTERVENTION COMMENTS:  Diagnosis: History of CVA (cerebrovascular accident) [Z86 73]  Precautions: Fall risk, pacemaker, No E-STIM   FOTO: Completed  Insurance: Payor: [address] / Plan: Ron Abdullahi / Product Type: Medicare HMO /   4 of 8 visits, PN due 2/9/2023  POC ends 01/23/2023

## 2023-01-13 ENCOUNTER — CLINICAL SUPPORT (OUTPATIENT)
Dept: INTERNAL MEDICINE CLINIC | Facility: CLINIC | Age: 83
End: 2023-01-13

## 2023-01-13 DIAGNOSIS — E53.8 B12 DEFICIENCY: Chronic | ICD-10-CM

## 2023-01-13 RX ADMIN — CYANOCOBALAMIN 1000 MCG: 1000 INJECTION, SOLUTION INTRAMUSCULAR; SUBCUTANEOUS at 09:04

## 2023-01-13 NOTE — PROGRESS NOTES
Patient seen here today on nurse schedule for b12 injection  Administered 1 mL in patient right deltoid  Patient tolerated well  Patient notified to return in 30 days for b12 injection

## 2023-01-16 ENCOUNTER — OFFICE VISIT (OUTPATIENT)
Dept: OCCUPATIONAL THERAPY | Facility: CLINIC | Age: 83
End: 2023-01-16

## 2023-01-16 ENCOUNTER — OFFICE VISIT (OUTPATIENT)
Dept: PHYSICAL THERAPY | Facility: CLINIC | Age: 83
End: 2023-01-16

## 2023-01-16 DIAGNOSIS — Z86.73 HISTORY OF CVA (CEREBROVASCULAR ACCIDENT): Primary | ICD-10-CM

## 2023-01-16 DIAGNOSIS — Z74.09 IMPAIRED FUNCTIONAL MOBILITY, BALANCE, GAIT, AND ENDURANCE: ICD-10-CM

## 2023-01-16 DIAGNOSIS — Z74.1 NEED FOR ASSISTANCE WITH PERSONAL CARE: ICD-10-CM

## 2023-01-16 NOTE — PROGRESS NOTES
Daily Note     Today's date: 2023  Patient name: Edin Su  : 1940  MRN: [id number]  Referring provider: Manish Sapp PA-C  Dx:   Encounter Diagnosis     ICD-10-CM    1  History of CVA (cerebrovascular accident)  Z86 73       2  Impaired functional mobility, balance, gait, and endurance  Z74 09                      Subjective: Patient reports her L shoulder is hurting more today, especially when lifting it  She states she is not sure if she can do the treadmill due to her shoulder pain, was willing to attempt  Objective: See treatment diary below       Assessment: Tolerated treatment fair  Was able to perform hurdles leading with R LE and L LE, cues for foot positioning (L LE placement with stepping over first and maintaining trailing limb posture with leading with R LE to allow for clearance of toes over jim)  Cues for posture (keeping head up) and increased step length on L LE with TM ambulation  Improved ability to perform sit to stand holding ball over L hip (promoting weight shifting over L LE)  More difficulty with going sit to supine, with much improvement with performing supine to sit and rolling without cuing  Continue to progress as tolerated  Patient demonstrated fatigue post treatment      Plan: Continue per plan of care        Precautions: HTN, a-fib, pacemaker    Manuals                                            Neuro Re-Ed          Standing marching          sidesteps     SOLO  2# AW on L LE, 1/4 lap x 3 laps SOLO  2# AW on L LE, 1/4 lap x 3 laps    HKM SOLO 3# AW LLE 1/4 length  X 4 laps    SOLO  2# AW on L LE, 1/4 lap x 3 laps SOLO  2# AW on L LE, 1/4 lap x 4 laps SOLO  3# AW on L LE, 1/4 lap x 3 laps   Sit to stand with LE elevated     Sit to stand from high mat table, arms crossed x 10 reps     Step taps          backwards SOLO 3# LLE AW 1/4 length  X 4 laps SOLO  3# AW on L LE  1/4 lap x 3 laps  SOLO 3# AW on LLE 1/4 length x 1 laps Patient c/o wt to heavy 2# Aw on LLE 1/4 length x 2 laps  V c  for increasing LLE step length  SOLO  2# AW on L LE, 1/4 lap x 4 laps, cues for increased step length on L LE    hurdles SOLO 3# AW LLE 4" jim (1) and 6" hurdles (3)    Fwd x 1 lap difficulty lifting LLE    3# AW LLE  Over canes (4)  Fwd x 3 laps alt lead Lead     Lat x 3 laps  Increased difficulty lifting LLE over canes landing on cane SOLO  Low hurdles (4) step-to pattern leading with L LE forward x 3 laps (therapist holding down jim from moving)    Low hurdles (3) leading with R LE first x 2 laps  SOLO 2# AW on LLE low hurdles (5) leading w/LLE  Fwd x 1 lap w/AW  Fwd x 1 lap without AW  Patient declined further hurdles stating I can not do it  SOLO 2# AW on L LE low hurdles (4)  SOLO  2# AW on L LE, low hurdles (4) with SPC on R side   3 laps (therapist holding jim from moving), leading with L LE      Step up SOLO 3# LLE AW 4" step  Fwd x 10 ea  Min Ax1 with LLE   SOLO No AW 6" step up/over x 10 reps w/RLE lead  Increased difficulty this session with LLE amplitude SOLO, 2# AW on L LE  Up/over 4-inch (leading with L LE up and down) and 6-inch (2) leading with R LE up and down, x 2 laps, partially with HHA  1 LOB SOLO, 2# AW on L LE  Up/over 4-inch (leading up/down with L LE), 6-inch (leading with R LE), and onto foam pad (1) x 3 laps, 1 LOB     Tall kneeling          foam          Bolster kick          Bean bag  from floor                              Ther Ex          Sit to stand From chair w/arms x 10  CGA  Difficulty scooting forward and putting wt through LLE  Put airex on chair with improved STS From chair with arms x 10 with SPV, cues for increased WB on L LE    Holding ball with both hands over L hip STS while seated on foam pad x 10 reps    Sit to stand from chair x 15 reps 3 x 5 with UE's from chair   nustep          treadmill                                                                Ther Activity          Floor transfer Bed mobility  Sit to supine and rolling each direction x 5 reps with 3# AW on L LE  Scooting up bed     Sit <--> supine x 4 reps, rolling each direction with pushing through LE    Supine L LE heel slides x 15 reps, hip abduction/adduction x 20 reps             Gait Training       6MWT 670 feet   Gait with SPC          With FWW in hallway          Gait training on TM SOLO  TM No AW  B/L UE Support    1 6 mph 3% incline x 10 min SOLO  TM no AW  B/L UE support    1  5mph for 5 minutes     (required seated rest break due to shoulder pain)    1 7mph for 5 minutes      SOLO TM w/B/L UE support    1 5 mph x 7 min  Patient requested lower speed due to very tired yesterday and could not get out of bed  3# AW on LLE 1 5 mph - 1 4 mph x 3 min  Patient states she is tired and can not do it  Needed encouragement to finish the 3 min SOLO TM with BUE support    1  5mph for 6 minutes    1  5mph with 2# AW on L LE for 6 minutes    Seated rest break between trials  SOLO TM with BUE support     1  5mph for 5 minutes    Amb without AD      Across therapy clinic (40 feet) x 3 trials with no AD/HHA    staircase  Up and over staircase with BUE support and 3# AW on L LE, reciprocal pattern x 4 laps     Up and over staircase x 3 reps with BUE support, reciprocal pattern   Education       Progress with therapy, HEP (heel slides and angels) bed mobility techniques, progress with therapy, POC, progress with outcome measures                                     · Access Code: [email or web address]  URL: [email or web address]  Date: 10/13/2022  Prepared by: Mike Felizos    Exercises   • Seated Hamstring Stretch with Strap - 1 x daily - 7 x weekly - 3 sets - 30 hold  • Seated Gastroc Stretch with Strap - 1 x daily - 7 x weekly - 3 sets - 30 hold  • Seated Long Arc Quad - 1 x daily - 5 x weekly - 3 sets - 10 reps  • Seated March - 1 x daily - 5 x weekly - 3 sets - 10 reps  • Seated Heel Raise - 1 x daily - 5 x weekly - 3 sets - 10 reps  • Seated Hip Adduction Isometrics with Ball - 1 x daily - 5 x weekly - 3 sets - 10 reps - 5 hold  • Seated Hip Abduction - 1 x daily - 5 x weekly - 3 sets - 10 reps  • Seated Toe Raise - 1 x daily - 7 x weekly - 2 sets - 10 reps  • Supine Ankle Pumps - 1 x daily - 5 x weekly - 2 sets - 10 reps  • Supine Heel Slide - 1 x daily - 5 x weekly - 2 sets - 10 reps  • Supine Bridge - 1 x daily - 3 x weekly - 2 sets - 10 reps  • Standing March with Counter Support - 1 x daily - 3 x weekly - 2 sets - 10 reps  • Side Stepping with Counter Support - 1 x daily - 3 x weekly - 5 sets

## 2023-01-16 NOTE — PROGRESS NOTES
Occupational Therapy Daily Note:    Today's date: 2023  Patient name: Galina Laguna  : 1940  MRN: [id number]  Referring provider: Amandeep Carbajal PA-C  Dx:   Encounter Diagnoses   Name Primary? • History of CVA (cerebrovascular accident) Yes   • Need for assistance with personal care      TIME     3345-7068 G   [id number] Unsup     Subjective: "My arm feels much better  I was in a lot of pain "    Objective: Pt engaged in skilled OT treatment session with focus on UE NMR, UE strengthening, UE endurance, FMC/GMC and FMS/GMS and standing tolerance to increase engagement, endurance, tolerance, and independence with daily ADL and IADL tasks  CPT Code Minutes                                           Task Details        Therapeutic Activity  Pt concluded with handwriting task with a weighted pencil, following x,y coordinates and tracing lines to promote intrinsic hand strength on pencil, fluidity with writing demands, and hand endurance  Provided sheet to take home  Neuro Re-Ed               Therapeutic Exercise  Pt engaged in UE AROM stretching into forward lat stretch x 30 sec x 4, thoracic rotations x 30 sec x 2 and supine on foam roll x 1 min for pec stretch  In quadruped pt engaged in thread the needle task with BUEs incorporating thoracic rotation to improve mobility of spine and shoulder/lats  In side lying, engaged in thoracic and shoulder rotations into shoulder horizontal abduction for scapular rotation and mobility  Educated pt on HEP to complete at home  Rec if pain continues to apply moist heat to posterior L shoulder              Manual  Started with MFR to L lat dorsi / teres minor while passively moving shoulder in flexion / abduction to decrease pain and improve mobility with movement  Provided manual massage to muscles for pain reduction  Self Care         Assessment: Tolerated treatment well  Patient would benefit from continued skilled OT  Pt responded well to manual techniques for pain reduction, with report of reduced pain after  Pt req min-mod A to safely move through quadruped movements on mat 2* weakness and decreased mobility  Pt demo G understanding of active stretching at home and rec for pain reduction if continues  Pt reported minimal hand fatigue with weighted pencil today  Plan: Continued skilled OT per POC      INTERVENTION COMMENTS:  Diagnosis: History of CVA (cerebrovascular accident) [Z86 73]  Precautions: Fall risk, pacemaker, No E-STIM   FOTO: Completed  Insurance: Payor: [facility] IVONNE Vieira / Plan: Nicanor Duverney / Product Type: Medicare HMO /   5 of 8 visits, PN due 2/9/2023  POC ends 01/23/2023

## 2023-01-18 ENCOUNTER — APPOINTMENT (OUTPATIENT)
Dept: PHYSICAL THERAPY | Facility: CLINIC | Age: 83
End: 2023-01-18

## 2023-01-23 ENCOUNTER — APPOINTMENT (OUTPATIENT)
Dept: OCCUPATIONAL THERAPY | Facility: CLINIC | Age: 83
End: 2023-01-23

## 2023-01-25 ENCOUNTER — OFFICE VISIT (OUTPATIENT)
Dept: PHYSICAL THERAPY | Facility: CLINIC | Age: 83
End: 2023-01-25

## 2023-01-25 ENCOUNTER — OFFICE VISIT (OUTPATIENT)
Dept: OCCUPATIONAL THERAPY | Facility: CLINIC | Age: 83
End: 2023-01-25

## 2023-01-25 DIAGNOSIS — Z74.09 IMPAIRED FUNCTIONAL MOBILITY, BALANCE, GAIT, AND ENDURANCE: Primary | ICD-10-CM

## 2023-01-25 DIAGNOSIS — Z74.1 NEED FOR ASSISTANCE WITH PERSONAL CARE: ICD-10-CM

## 2023-01-25 DIAGNOSIS — Z86.73 HISTORY OF CVA (CEREBROVASCULAR ACCIDENT): Primary | ICD-10-CM

## 2023-01-25 DIAGNOSIS — Z86.73 HISTORY OF CVA (CEREBROVASCULAR ACCIDENT): ICD-10-CM

## 2023-01-25 NOTE — PROGRESS NOTES
Daily Note     Today's date: 2023  Patient name: Edin Su  : 1940  MRN: [id number]  Referring provider: Manish Sapp PA-C  Dx:   Encounter Diagnosis     ICD-10-CM    1  Impaired functional mobility, balance, gait, and endurance  Z74 09       2  History of CVA (cerebrovascular accident)  Z86 73           Start Time: 1000  Stop Time: 1100  Total time in clinic (min): 60 minutes    Subjective: Patient reports she continues to have some pain in the L shoulder  She states she was sick last week  Objective: See treatment diary below  1:1 with PT from 1685-9875  Group from 8739-2429  Self-directed exercise from 8302-0588     Assessment: Tolerated treatment fair  SOLO unavailable during session, performed exercises in // bars with UE support prn  Increased AW to 4# during session and tolerated well  Continues to require cues for foot positioning prior to stepping over jim to prevent L LE from not being able to clear, with significant improvement with increased weight and height of jim during session  Tends to push back on chair/mat with performing sit to stand, however is able to perform with cuing for increased anterior trunk lean and increased WB on L side  Patient demonstrated fatigue post treatment      Plan: Continue per plan of care  Precautions: HTN, a-fib, pacemaker    Manuals                                        Neuro Re-Ed         Standing marching         sidesteps   // bars (short) with 4# AW on L LE, x 8 laps with single UE support  SOLO  2# AW on L LE, 1/4 lap x 3 laps    HKM SOLO 3# AW LLE 1/4 length  X 4 laps  // bars (short) with 4# AW on L LE, x 6 laps  Single UE support  SOLO  2# AW on L LE, 1/4 lap x 4 laps SOLO  3# AW on L LE, 1/4 lap x 3 laps   Sit to stand with LE elevated         Step taps         backwards SOLO 3# LLE AW 1/4 length  X 4 laps SOLO  3# AW on L LE  1/4 lap x 3 laps // bars (short) with 4# AW on L LE, x 4 laps   Single UE support  SOLO  2# AW on L LE, 1/4 lap x 4 laps, cues for increased step length on L LE    hurdles SOLO 3# AW LLE 4" jim (1) and 6" hurdles (3)    Fwd x 1 lap difficulty lifting LLE    3# AW LLE  Over canes (4)  Fwd x 3 laps alt lead Lead     Lat x 3 laps  Increased difficulty lifting LLE over canes landing on cane SOLO  Low hurdles (4) step-to pattern leading with L LE forward x 3 laps (therapist holding down jim from moving)    Low hurdles (3) leading with R LE first x 2 laps // bars (short) with 6-inch hurdles (3) and 4# AW on L LE, forward x 3 laps  Step-to pattern with alternating laps of leading LE  BUE/unilateral UE support  SOLO  2# AW on L LE, low hurdles (4) with SPC on R side  3 laps (therapist holding jim from moving), leading with L LE      Step up SOLO 3# LLE AW 4" step  Fwd x 10 ea  Min Ax1 with LLE    SOLO, 2# AW on L LE  Up/over 4-inch (leading up/down with L LE), 6-inch (leading with R LE), and onto foam pad (1) x 3 laps, 1 LOB     Tall kneeling         foam         Bolster kick         Bean bag  from floor                           Ther Ex         Sit to stand From chair w/arms x 10  CGA  Difficulty scooting forward and putting wt through LLE  Put airex on chair with improved STS From chair with arms x 10 with SPV, cues for increased WB on L LE    Holding ball with both hands over L hip STS while seated on foam pad x 10 reps From low mat table and no/L UE support  Cues to maintain midline, x 15 reps, cues for increased anterior trunk lean  Sit to stand from chair x 15 reps 3 x 5 with UE's from chair   nustep         treadmill                                                          Ther Activity         Floor transfer         Bed mobility  Sit to supine and rolling each direction x 5 reps with 3# AW on L LE   Scooting up bed Sit to supine, supine to sit x 10 reps with 4# AW on L LE   Sit <--> supine x 4 reps, rolling each direction with pushing through LE    Supine L LE heel slides x 15 reps, hip abduction/adduction x 20 reps            Gait Training      6MWT 670 feet   Gait with SPC         With FWW in hallway         Gait training on TM SOLO  TM No AW  B/L UE Support    1 6 mph 3% incline x 10 min SOLO  TM no AW  B/L UE support    1  5mph for 5 minutes     (required seated rest break due to shoulder pain)    1 7mph for 5 minutes       SOLO TM with BUE support     1  5mph for 5 minutes    Amb without AD     Across therapy clinic (40 feet) x 3 trials with no AD/HHA    staircase  Up and over staircase with BUE support and 3# AW on L LE, reciprocal pattern x 4 laps    Up and over staircase x 3 reps with BUE support, reciprocal pattern   Education      Progress with therapy, HEP (heel slides and angels) bed mobility techniques, progress with therapy, POC, progress with outcome measures                                  · Access Code: [email or web address]  URL: [email or web address]  Date: 10/13/2022  Prepared by: Maximus Smart    Exercises   • Seated Hamstring Stretch with Strap - 1 x daily - 7 x weekly - 3 sets - 30 hold  • Seated Gastroc Stretch with Strap - 1 x daily - 7 x weekly - 3 sets - 30 hold  • Seated Long Arc Quad - 1 x daily - 5 x weekly - 3 sets - 10 reps  • Seated March - 1 x daily - 5 x weekly - 3 sets - 10 reps  • Seated Heel Raise - 1 x daily - 5 x weekly - 3 sets - 10 reps  • Seated Hip Adduction Isometrics with Ball - 1 x daily - 5 x weekly - 3 sets - 10 reps - 5 hold  • Seated Hip Abduction - 1 x daily - 5 x weekly - 3 sets - 10 reps  • Seated Toe Raise - 1 x daily - 7 x weekly - 2 sets - 10 reps  • Supine Ankle Pumps - 1 x daily - 5 x weekly - 2 sets - 10 reps  • Supine Heel Slide - 1 x daily - 5 x weekly - 2 sets - 10 reps  • Supine Bridge - 1 x daily - 3 x weekly - 2 sets - 10 reps  • Standing March with Counter Support - 1 x daily - 3 x weekly - 2 sets - 10 reps  • Side Stepping with Counter Support - 1 x daily - 3 x weekly - 5 sets

## 2023-01-25 NOTE — PROGRESS NOTES
OCCUPATIONAL THERAPY DISCHARGE      1/25/2023  Tom Liz  1940  [id number]  Kedar Casiano PA-C     Diagnosis ICD-10-CM Associated Orders   1  History of CVA (cerebrovascular accident)  Z86 73       2  Need for assistance with personal care  Z74 1           Assessment/Plan    Skilled Analysis:  Tom Liz is a 80 y o  female referred to Occupational Therapy s/p History of CVA (cerebrovascular accident) [Z86 73]  Pt participated in skilled OT progress note #2 to assess functional progression towards goals/POC  Pt reports since start of OT, her LUE strength and ROM has improved for ADLs / IADLs  She reports improved ability to grasp small objects without dropping it  Her handwriting has significantly improved  She is able to grasp and hold forks/spoons well  She reports requiring less assistance for dressing/showering ADLs and has been educated on AE to improve independence including a long handled sponge, dressing stick, and reacher  Clinically, pt is showing improved confidence with ADLs and daily tasks  She is also demonstrating improved speed throughout all testing (FMC/Dexterity)  She is continuing with UB exercises and FMS exercise program using resistive theraputty  Pt has met 100% of her STGs listed below and has met most of her LTG below  At this time, Lillian Ma is recommending D/C from OT services  I am recommending pt to continue with physical therapy, consider BOTOX injections for LLE 2* tone/pain, and complete BUE mobility exercises given to her  Tom Liz is in agreement with POC  OT D/C  Motor Short Term Goals (4-6 weeks)    Strength/Endurance  ·  Pt will increase LUE proximal shoulder strength to 4+/5  through the use of strengthening exercises and HEP for improving performance during ADL/IADLs for eventual return to life roles   = GOAL MET  · Pt will increase LUE distal forearm/wrist strength to 4+/5 through the use of strengthening exercises and HEP for eventual return to life roles and independence with ADL/IADLs  = GOAL MET  · Pt will demo with G tolerance to supine, seated, and in stance exercise x 15-20 minutes with minimal rest breaks required for increased engagement in life roles and increased independence with ADLs/IADLs  = GOAL MET  · Pt will demo with G carryover of HEP to improve functional progression towards goals in Plan of care and for improved functional use of LUE  = GOAL MET    FMC/GMC   · Pt will increase LUE rate of manipulation by 10% for all FM tests for improved functional performance with salient tasks = GOAL MET  · Pt will demo improved motor learning of constructional and new motor actions for improved b/l coordination and motor planning evident by 75% accuracy for fxnl ADL/IADL performance = GOAL MET    Functional Performance   · Pt will increase LUE to functional assist (UE has full AROM, uses for all activities and fine motor tasks, but remains assistive with mild awkwardness and weakness) for ADL/IADLs and tabletop tasks for improved functional performance of life roles   = GOAL MET  · Pt will demo G comprehension of adaptive equipment (long handled reacher/sponge/shoehorn, toileting aid) use in clinic to improve independence in ADL management in home environment  = GOAL MET  · Pt will increase proprioception of LUE hand to target for improved functional reach vision occluded with ADL tasks  x 75% accuracy = GOAL MET  · Pt will require < moderate assist for ADLs (dressing, showering, toileting, grooming) for improved independence with all self-care tasks = GOAL MET  · Pt will be able to manage all medications, laundry, and cooking with < Moderate assist from son in order to increase IADL independence = GOAL MET        Motor Long Term Goals (12 weeks)     Strength/Endurance  ·  Pt will increase LUE proximal shoulder strength to 5/5  through the use of strengthening exercises and HEP for improving performance during ADLs/IADLs for eventual return to life roles  =PARTIALLY MET  · Pt will increase LUE distal forearm/wrist strength to 5/5  through the use of strengthening exercises and HEP for eventual return to life roles and independence with ADL/IADLs  = PARTIALLY MET  · Pt will demo with G tolerance to supine, seated, and in stance exercise x 25-35 minutes with minimal rest breaks required for increased engagement in life roles and increased independence with ADL/IADLs  = GOAL MET    FMC/GMC   · Pt will increase LUE rate of manipulation by 15% for all FM tests for improved functional performance with salient tasks = GOAL MET  · Pt will demo improved motor learning of constructional and new motor actions for improved b/l coordination and motor planning evident by 90% accuracy for fxnl ADL/IADL performance = PARTIALLY MET, difficulty in BLE coordination    Functional Performance   · Pt will increase LUE to fully functional assist (completely functional, used as dominant UE) for ADL/IADL and tabletop tasks for improved functional performance of life roles  = GOAL MET  · Pt will demo excellent comprehension of adaptive equipment (long handled reacher/sponge/shoehorn, toileting aid) use in clinic to improve independence in ADL management in home environment   = PARTIALLY MET  · Pt will increase proprioception of LUE hand to target for improved functional reach vision occluded with ADL tasks  x 90% accuracy = GOAL MET  · Pt will completed ADLs (dressing, showering, toileting, grooming) with modified independence for improved independence with all self-care tasks = PARTIALLY MET  · Pt will be able to manage all medications, laundry, and cooking with Modified [place] in order to increase IADL independence = PARTIALLY MET        Subjective    SUBJECTIVE: "You have helped me so much!"    PATIENT GOAL: "I would like to have 100% again"     Patient-Specific Functional Scale   Task is scored 0 (unable to perform activity) to 10 (ability to perform activity independently)    Activity Date:10/10/2022 Date: 1/25/2023   1  Dressing (shirts and pants) 2-3/10 Pt unable to dress self without assistance 8/10 with shirt / jacket     8/10 pants    2  Bathing 4/10 7/10   Remained      3  Strength of LUE Arm 5/10  Leg 2/10 7/10  Remained     4  Grasping/pinching 5/10 Pt has difficulty with picking up pills and small objects 8/10   Remained   5  Writing  (NEW 12/12)   "It's better!"        HISTORY OF PRESENT ILLNESS:     Jovany Hylton is a 80 y o  female who was referred to Occupational Therapy s/p  History of CVA (cerebrovascular accident) [Z86 73]  Pt with recent hospitalization from [place] [address] from 8/12/2022-8/19/2022 presenting with weak LUE, flaccid LLE, left facial droop, mild dysarthria, and mild sensory deficits on the left side  Pt reported she had difficulty moving from her bed on 8/12/2022, son called ambulance  CTA revealed near occlusive thrombus at R MCA bifurcation  Pt was started on stroke protocol heparin gtt (drops)  Pt changed medications from Xarelto to Pradaxa (think Xarelto failure was cause of stroke)  Pt d/c to [facility] rehab where she received OT, PT, ST from 8/13/2022-9/13/2022  Pt transitioned to [facility] where she was d/c 9/24/2022 home to her family  Pt has PMH of a-fib on Xarelto (now changed) with MRI-incompatible pacemaker, osteoarthritis in both wrists, HLD, and HTN  Pt resides in an apartment on the 8th floor with GARRY but uses the elevator  With son close by, pt is using walker to get around her apartment  Pt's son is currently living with her for assistance with ADLs/IADLs  Pt is completing ADLs with assistance from son  Pt is currently using DME (walker, w/c, toilet commode, shower chair)  Looking into getting a medical bed for repositioning and upright posture due to risk for aspiration/coughing  Pt is not currently using any AE  Pt likes to shop, go to [employer], watching tv  Pt has 3 grandchildren    Pt is currently retired but used to work with [email or web address]  Pt was independent prior to the stroke  Pt son applied for pt to get into a lower level apartment in case of emergencies  Possibly looking for A  Pt presenting with ongoing difficulties with: ADLs/IADLs (bathing, eating, dressing), pinching pills, doing laundry, vision blurriness, reaching with LUE and LUE strength  PMH:   Past Medical History:   Diagnosis Date   • A-fib ([address] )    • Anemia    • Arthritis    • Breast pain, right    • Chest pain on breathing    • Colon polyp    • Cough    • Diverticulosis    • Encounter for screening colonoscopy    • H/O sick sinus syndrome    • Heart murmur    • High cholesterol    • History of atrial fibrillation     Rate ontrolled  On xarelto 15mg (creatinine 50) Followed by Dr Vangie Vu with Cardiology    • History of weight loss     Report loose fitting clothes  Weight stable (3lbs difference)  Last colo showed small tubular adenoma x2  Breast biopsy last showed fibrocystic changes  TSH wnl  Will check cbc, bmp, spep         • Hx of long term use of blood thinners    • Hypertension    • Irregular heart beat    • Pacemaker    • Preop examination    • Shortness of breath    • Viral bronchitis        Pain Levels   Restin/10 - Pain in her L hip/leg   Pain is mainly when she is in bed trying to get comfortable    With Activity:   Some pain in her leg (numbness) and decreased balance after walking with walker    Objective    Impairment Observations:    SEGUNDO CHANG Comments           UPPER EXTREMITY FUNCTION   Intact Impaired Dominant Hand: Left     /PINCH STRENGTH             Dynamometer    - Gross Grasp 35 lns 45 lbs within normal limits     L increased 15 lbs     Pinch Meter     - Pincer 9 lbs 10 lbs within normal limits   L increased 2 lbs     - Tripod 10 lbs 12 lbs   within normal limits   L increased 2 lbs    - Lateral 9 lbs  9 lbs subnormal   L increased 1 lb     AROM (Seated)            Elevation Full Full     Shoulder FF Full WNL Improved   Shoulder Ext Full  WNL  Improved   Shoulder Abd Full  Full     Shoulder Add Full  Full     Horizontal Abd Full  Full     Horizontal Add Full  WFL     Elbow Flex Full  WFL     Elbow Ext Full  Full     Pronation Full  Full     Supination Full  Full     Wrist Flex Full  WNL  Improved   Wrist Ext Full  WNL  Improved    Digit Flex Full  Full     Digit Ex Full  Full     Hook Grasp Full  Full     Subluxation  None  None       MMT             Shoulder FF 4+/5 4+/5 Improved L   Shoulder Ext 4+/5 4+/5 Improved L    Shoulder Abduction 4/5 4/5    Shoulder Adduction 4/5 4/5 Improved L   Elbow Flex 4+/5 4+/5 Improved L   Elbow Ext 4+/5 4/5    Wrist Flex 4+/5 4/5    Wrist Ext 4/5 4/5      SENSATION      Monofilament Testing  Normal: 2 83 mm    Diminished light touch: 3 61 mm    Diminished protective sensation: 4 31 mm    Loss of protective sensation: 4 56    Complete loss of sensation / deep pressure: 6 65 3 61 mm 3 61 mm L/R remained still unable to feel 2 83 mm on L/RUE    Sharp / Dull  Intact Intact    Proprioception Intact Impaired    Hot/Cold Temp Intact Intact    Stereognosis  Intact Intact      COORDINATION      Opposition Intact WFL    Finger to Nose [facility] WFL    Rapid Alternating Movement WFL Impaired Most difficulty with supinated/pronated movements   9 Hole Peg Test 23 seconds 27 94 seconds subnormal   L improved 3 sec   R improved 10 sec       Fxnl Dexterity Test 38 seconds    Used board 100% of the time to assist in rotation of pegs 48 sec      Used board and/or rotating arm 50% of time low      Remained*    Moose Hand Function Test    **DNT 1/25/23         - Handwriting  44 sec   abnormal      - Card Turning  8 78 sec  subnormal      - Small Item p/u  12 22 subnormal      - Simulated Feeding  18 33 sec  abnormal      - Stacking Checkers  12 90 sec subnormal      - Moving Light Objects  4 67 sec within normal limits      - Moving Heavy Objects  4 67 sec  within normal limits   Concentric Circles Test (tremors)          TONE: MODIFIED BERTIN SCALE        No increase in muscle tone (0)  0    Slight Increase in muscle tone with catch and release or min resist at end range (1)      Slight Increase in muscle tone with catch and release, followed by min resistance through remainder of range (1+)      Increased muscle tone through full range, able to be moved easily (2)      Considerable increase in tone, difficult to move (3)      Rigid in Flexion/Extension (4)              INTERVENTION COMMENTS:  Diagnosis: History of CVA (cerebrovascular accident) [Z86 73]  Precautions: Fall risk, pacemaker, No E-STIM   FOTO: Completed 1/25  Insurance: Payor: Wilber Eddy MEDICARE [facility] REP / Plan: Nicanor Duverney / Product Type: Medicare HMO /   6 of 8 visits

## 2023-01-27 ENCOUNTER — HOSPITAL ENCOUNTER (OUTPATIENT)
Dept: PULMONOLOGY | Facility: HOSPITAL | Age: 83
End: 2023-01-27

## 2023-01-27 ENCOUNTER — TELEPHONE (OUTPATIENT)
Dept: INTERNAL MEDICINE CLINIC | Facility: CLINIC | Age: 83
End: 2023-01-27

## 2023-01-27 DIAGNOSIS — I27.20 PULMONARY HYPERTENSION (HCC): ICD-10-CM

## 2023-01-27 RX ORDER — ALBUTEROL SULFATE 2.5 MG/3ML
2.5 SOLUTION RESPIRATORY (INHALATION) ONCE
Status: COMPLETED | OUTPATIENT
Start: 2023-01-27 | End: 2023-01-27

## 2023-01-27 RX ADMIN — ALBUTEROL SULFATE 2.5 MG: 2.5 SOLUTION RESPIRATORY (INHALATION) at 10:18

## 2023-01-27 NOTE — TELEPHONE ENCOUNTER
Patient is calling in because she was made aware that the home sleep study will not be covered if it is ordered to be done at home  Please change script to be done at a sleep study center  Once changed please let me know so patient can move forward with getting this completed  Thank you

## 2023-01-31 ENCOUNTER — OFFICE VISIT (OUTPATIENT)
Dept: PHYSICAL THERAPY | Facility: CLINIC | Age: 83
End: 2023-01-31

## 2023-01-31 DIAGNOSIS — Z74.09 IMPAIRED FUNCTIONAL MOBILITY, BALANCE, GAIT, AND ENDURANCE: Primary | ICD-10-CM

## 2023-01-31 DIAGNOSIS — Z86.73 HISTORY OF CVA (CEREBROVASCULAR ACCIDENT): ICD-10-CM

## 2023-01-31 NOTE — PROGRESS NOTES
Daily Note     Today's date: 2023  Patient name: Matteo Betts  : 1940  MRN: [id number]  Referring provider: Cyril St PA-C  Dx:   Encounter Diagnosis     ICD-10-CM    1  Impaired functional mobility, balance, gait, and endurance  Z74 09       2  History of CVA (cerebrovascular accident)  Z86 73           Start Time: 09  Stop Time: 1043  Total time in clinic (min): 57 minutes    Subjective: Patient reports that her L shoulder/arm has been hurting her  Patient states she has doctor appt scheduled for April, but she does not want to wait that long  Advised patient to call her doctor if she continues with L shoulder/arm pain  Patient rates L shoulder/arm pain as 5/10 in beginning of session  Objective: See treatment diary below      Assessment: Tolerated treatment artis Sin participated in skilled PT session focused on balance, gait, and endurance  Patient continues to require cues for foot positioning prior to stepping over jim to prevent L LE from not being able to clear  Patient continues to need verbal and tactile cues for bigger steps during ambulation bwds, but improves with repetition  Patient would continue to benefit from skilled PT interventions to address balance, gait, and endurance  Patient demonstrated fatigue post treatment      Plan: Continue per plan of care  Precautions: HTN, a-fib, pacemaker    Manuals                                        Neuro Re-Ed         Standing marching         sidesteps   // bars (short) with 4# AW on L LE, x 8 laps with single UE support SOLO 4# L AW 1/4 length x 4 laps SOLO  2# AW on L LE, 1/4 lap x 3 laps    HKM SOLO 3# AW LLE 1/4 length  X 4 laps  // bars (short) with 4# AW on L LE, x 6 laps   Single UE support  SOLO  2# AW on L LE, 1/4 lap x 4 laps SOLO  3# AW on L LE, 1/4 lap x 3 laps   Sit to stand with LE elevated         Step taps         backwards SOLO 3# LLE AW 1/4 length  X 4 laps SOLO  3# AW on L LE  1/4 lap x 3 laps // bars (short) with 4# AW on L LE, x 4 laps  Single UE support SOLO 4# L AW  1/4 length  X 4 laps verbal and tactile cues for bigger LLE step SOLO  2# AW on L LE, 1/4 lap x 4 laps, cues for increased step length on L LE    hurdles SOLO 3# AW LLE 4" jim (1) and 6" hurdles (3)    Fwd x 1 lap difficulty lifting LLE    3# AW LLE  Over canes (4)  Fwd x 3 laps alt lead Lead     Lat x 3 laps  Increased difficulty lifting LLE over canes landing on cane SOLO  Low hurdles (4) step-to pattern leading with L LE forward x 3 laps (therapist holding down jim from moving)    Low hurdles (3) leading with R LE first x 2 laps // bars (short) with 6-inch hurdles (3) and 4# AW on L LE, forward x 3 laps  Step-to pattern with alternating laps of leading LE  BUE/unilateral UE support SOLO 4# L AW  Low hurdles (4)  Fwd x 3 laps with step to pattern with LLE lean (therapist holding down jim from moving) v c  for picking up LLE for clearing jim    Fwd RLE lead x 2 laps v c  for lifting LLE to clear jim SOLO  2# AW on L LE, low hurdles (4) with SPC on R side  3 laps (therapist holding jim from moving), leading with L LE      Step up SOLO 3# LLE AW 4" step  Fwd x 10 ea  Min Ax1 with LLE    SOLO, 2# AW on L LE  Up/over 4-inch (leading up/down with L LE), 6-inch (leading with R LE), and onto foam pad (1) x 3 laps, 1 LOB     Tall kneeling         foam         Bolster kick         Bean bag  from floor                           Ther Ex         Sit to stand From chair w/arms x 10  CGA  Difficulty scooting forward and putting wt through LLE  Put airex on chair with improved STS From chair with arms x 10 with SPV, cues for increased WB on L LE    Holding ball with both hands over L hip STS while seated on foam pad x 10 reps From low mat table and no/L UE support   Cues to maintain midline, x 15 reps, cues for increased anterior trunk lean From chair no UE use x 10 with SPV to CGA  V c  for increasing anterior trunk lean  Sit to stand from chair x 15 reps 3 x 5 with UE's from chair   nustep         treadmill                                                          Ther Activity         Floor transfer         Bed mobility  Sit to supine and rolling each direction x 5 reps with 3# AW on L LE  Scooting up bed Sit to supine, supine to sit x 10 reps with 4# AW on L LE   Sit <--> supine x 4 reps, rolling each direction with pushing through LE    Supine L LE heel slides x 15 reps, hip abduction/adduction x 20 reps            Gait Training      6MWT 670 feet   Gait with SPC         With FWW in hallway         Gait training on TM SOLO  TM No AW  B/L UE Support    1 6 mph 3% incline x 10 min SOLO  TM no AW  B/L UE support    1  5mph for 5 minutes     (required seated rest break due to shoulder pain)    1 7mph for 5 minutes      SOLO TM W/ 4# L AW B/L UE support    1 5 mph x 5 min  Seated rest break due LLE fatgued  1 5 mph x 5 min    V c  for picking LLE up and for L heel strike     SOLO TM with BUE support     1  5mph for 5 minutes    Amb without AD     Across therapy clinic (40 feet) x 3 trials with no AD/HHA    staircase  Up and over staircase with BUE support and 3# AW on L LE, reciprocal pattern x 4 laps    Up and over staircase x 3 reps with BUE support, reciprocal pattern   Education      Progress with therapy, HEP (heel slides and angels) bed mobility techniques, progress with therapy, POC, progress with outcome measures                                  · Access Code: [email or web address]  URL: [email or web address]  Date: 10/13/2022  Prepared by: Chavo Gonzalez    Exercises   • Seated Hamstring Stretch with Strap - 1 x daily - 7 x weekly - 3 sets - 30 hold  • Seated Gastroc Stretch with Strap - 1 x daily - 7 x weekly - 3 sets - 30 hold  • Seated Long Arc Quad - 1 x daily - 5 x weekly - 3 sets - 10 reps  • Seated March - 1 x daily - 5 x weekly - 3 sets - 10 reps  • Seated Heel Raise - 1 x daily - 5 x weekly - 3 sets - 10 reps  • Seated Hip Adduction Isometrics with Ball - 1 x daily - 5 x weekly - 3 sets - 10 reps - 5 hold  • Seated Hip Abduction - 1 x daily - 5 x weekly - 3 sets - 10 reps  • Seated Toe Raise - 1 x daily - 7 x weekly - 2 sets - 10 reps  • Supine Ankle Pumps - 1 x daily - 5 x weekly - 2 sets - 10 reps  • Supine Heel Slide - 1 x daily - 5 x weekly - 2 sets - 10 reps  • Supine Bridge - 1 x daily - 3 x weekly - 2 sets - 10 reps  • Standing March with Counter Support - 1 x daily - 3 x weekly - 2 sets - 10 reps  • Side Stepping with Counter Support - 1 x daily - 3 x weekly - 5 sets

## 2023-02-01 DIAGNOSIS — I27.20 PULMONARY HYPERTENSION (HCC): Primary | ICD-10-CM

## 2023-02-01 NOTE — TELEPHONE ENCOUNTER
Spoke to pts son Donal who is on consent  Advised new order was placed and phone number given to schedule

## 2023-02-02 ENCOUNTER — OFFICE VISIT (OUTPATIENT)
Dept: PHYSICAL THERAPY | Facility: CLINIC | Age: 83
End: 2023-02-02

## 2023-02-02 DIAGNOSIS — Z74.09 IMPAIRED FUNCTIONAL MOBILITY, BALANCE, GAIT, AND ENDURANCE: Primary | ICD-10-CM

## 2023-02-02 DIAGNOSIS — Z86.73 HISTORY OF CVA (CEREBROVASCULAR ACCIDENT): ICD-10-CM

## 2023-02-02 NOTE — PROGRESS NOTES
Daily Note     Today's date: 2023  Patient name: Sue Ponce  : 1940  MRN: [id number]  Referring provider: Lavelle Jeffries PA-C  Dx:   Encounter Diagnosis     ICD-10-CM    1  Impaired functional mobility, balance, gait, and endurance  Z74 [id number]       2  History of CVA (cerebrovascular accident)  Z86 [id number]                      Subjective: Patient reports that Her arm continues to bother her and she is also reporting L foot pain today  States foot pain is because her toes curl since having the stroke  Pt states she has been unsuccessful in getting an earlier MD appt to have her shoulder checked  She is currently scheduled for April  Objective: See treatment diary below      Assessment: Tolerated treatment fair  Stoney Laws participated in skilled PT session focused on balance, gait, and endurance  Patient continues to require verbal and occasional tactile cues for increased step length and heel strike  Pt became upset while doing hurdles today and declined to continue the exercise  Pattient would continue to benefit from skilled PT interventions to address balance, gait, and endurance  Patient demonstrated fatigue post treatment      Plan: Continue per plan of care  Precautions: HTN, a-fib, pacemaker    Manuals  2/2                                        Neuro Re-Ed         Standing marching         sidesteps   // bars (short) with 4# AW on L LE, x 8 laps with single UE support SOLO 4# L AW 1/4 length x 4 laps SOLO 4# L AW  1/4 length x 4    HKM SOLO 3# AW LLE 1/4 length  X 4 laps  // bars (short) with 4# AW on L LE, x 6 laps  Single UE support  SOLO 4# L AW  1/4 length x4      Sit to stand with LE elevated         Step taps         backwards SOLO 3# LLE AW 1/4 length  X 4 laps SOLO  3# AW on L LE  1/4 lap x 3 laps // bars (short) with 4# AW on L LE, x 4 laps   Single UE support SOLO 4# L AW  1/4 length  X 4 laps verbal and tactile cues for bigger LLE step SOLO 4# L AW 1/4 length x 4  VC for increased L step length    hurdles SOLO 3# AW LLE 4" jim (1) and 6" hurdles (3)    Fwd x 1 lap difficulty lifting LLE    3# AW LLE  Over canes (4)  Fwd x 3 laps alt lead Lead     Lat x 3 laps  Increased difficulty lifting LLE over canes landing on cane SOLO  Low hurdles (4) step-to pattern leading with L LE forward x 3 laps (therapist holding down jim from moving)    Low hurdles (3) leading with R LE first x 2 laps // bars (short) with 6-inch hurdles (3) and 4# AW on L LE, forward x 3 laps  Step-to pattern with alternating laps of leading LE  BUE/unilateral UE support SOLO 4# L AW  Low hurdles (4)  Fwd x 3 laps with step to pattern with LLE lean (therapist holding down jim from moving) v c  for picking up LLE for clearing jim    Fwd RLE lead x 2 laps v c  for lifting LLE to clear jim Attempted, however pt became upset stating "I cant do this because my leg is bad  I dont want to do this one"    Step up SOLO 3# LLE AW 4" step  Fwd x 10 ea  Min Ax1 with LLE    SOLO 4# L AW 4" step   up L down R x10 reps with HHA  Tall kneeling         foam         Bolster kick         Bean bag  from floor                           Ther Ex         Sit to stand From chair w/arms x 10  CGA  Difficulty scooting forward and putting wt through LLE  Put airex on chair with improved STS From chair with arms x 10 with SPV, cues for increased WB on L LE    Holding ball with both hands over L hip STS while seated on foam pad x 10 reps From low mat table and no/L UE support  Cues to maintain midline, x 15 reps, cues for increased anterior trunk lean From chair no UE use x 10 with SPV to CGA  V c  for increasing anterior trunk lean  From chair no UE 2x10  With CG and cues for anterior trunk lean  nustep         treadmill                                                          Ther Activity         Floor transfer         Bed mobility  Sit to supine and rolling each direction x 5 reps with 3# AW on L LE  Scooting up bed Sit to supine, supine to sit x 10 reps with 4# AW on L LE               Gait Training         Gait with SPC         With FWW in hallway         Gait training on TM SOLO  TM No AW  B/L UE Support    1 6 mph 3% incline x 10 min SOLO  TM no AW  B/L UE support    1  5mph for 5 minutes     (required seated rest break due to shoulder pain)    1 7mph for 5 minutes      SOLO TM W/ 4# L AW B/L UE support    1 5 mph x 5 min  Seated rest break due LLE fatgued  1 5 mph x 5 min    V c  for picking LLE up and for L heel strike     SOLO TM 4# L AW B/L UE support    1 5 mph x 5 min seated rest break due to LLE fatigue    1  5mph x 5 min  No weight    VC for increased L step length and heel strike    Amb without AD         staircase  Up and over staircase with BUE support and 3# AW on L LE, reciprocal pattern x 4 laps       Education                                        · Access Code: [email or web address]  URL: [email or web address]  Date: 10/13/2022  Prepared by: oLrena Altamirano    Exercises   • Seated Hamstring Stretch with Strap - 1 x daily - 7 x weekly - 3 sets - 30 hold  • Seated Gastroc Stretch with Strap - 1 x daily - 7 x weekly - 3 sets - 30 hold  • Seated Long Arc Quad - 1 x daily - 5 x weekly - 3 sets - 10 reps  • Seated March - 1 x daily - 5 x weekly - 3 sets - 10 reps  • Seated Heel Raise - 1 x daily - 5 x weekly - 3 sets - 10 reps  • Seated Hip Adduction Isometrics with Ball - 1 x daily - 5 x weekly - 3 sets - 10 reps - 5 hold  • Seated Hip Abduction - 1 x daily - 5 x weekly - 3 sets - 10 reps  • Seated Toe Raise - 1 x daily - 7 x weekly - 2 sets - 10 reps  • Supine Ankle Pumps - 1 x daily - 5 x weekly - 2 sets - 10 reps  • Supine Heel Slide - 1 x daily - 5 x weekly - 2 sets - 10 reps  • Supine Bridge - 1 x daily - 3 x weekly - 2 sets - 10 reps  • Standing March with Counter Support - 1 x daily - 3 x weekly - 2 sets - 10 reps  • Side Stepping with Counter Support - 1 x daily - 3 x weekly - 5 sets

## 2023-02-07 ENCOUNTER — EVALUATION (OUTPATIENT)
Dept: PHYSICAL THERAPY | Facility: CLINIC | Age: 83
End: 2023-02-07

## 2023-02-07 DIAGNOSIS — Z86.73 HISTORY OF CVA (CEREBROVASCULAR ACCIDENT): ICD-10-CM

## 2023-02-07 DIAGNOSIS — Z74.09 IMPAIRED FUNCTIONAL MOBILITY, BALANCE, GAIT, AND ENDURANCE: Primary | ICD-10-CM

## 2023-02-07 NOTE — PROGRESS NOTES
Re-Evaluation        Today's date: 2023  Patient name: Edin Su  : 1940  MRN: [id number]  Referring provider: Manish Sapp PA-C  Dx:   Encounter Diagnosis     ICD-10-CM    1  Impaired functional mobility, balance, gait, and endurance  Z74 09       2  History of CVA (cerebrovascular accident)  Z86 73           Start Time: 0948  Stop Time: 1030  Total time in clinic (min): 42 minutes     Assessment  23: Sarah Garcia has attended 28 sessions of physical therapy for CVA with L sided weakness  She continues to make progress with therapy since last progress note  She has met 3/4 short term goals at this time  Self-selected gait speed continues to improve, from 0 50m/s with SPC to 0 63m/s with SPC, 6MWT improved from 670 feet to 750 feet, TUG improved from 21 3 seconds to 17 1 seconds with SPC and from 20 0 sec to 18 9 sec with FWW  FGA continues to improve from 10/30 to  (requires SPC during testing which may be showing slight floor effect), demonstrating improvement of ambulation with eyes closed and changing speeds  She is demonstrating improving confidence with ambulation with SPC  She continues to be at increased risk of falls from self-selected gait speed below 1 0m/s, TUG greater than 13 5 seconds, 5xSTS greater than 15 seconds, and FGA below 22/30  She is now above cut-off scores for gait speed at 0 6m/s no longer placing her at increased risk of hospitalization and requiring assistance for ADL's and IADLS  She continues to demonstrate progress with therapy and would benefit from continued physical therapy to decrease fall risk, improve balance, improve functional mobility, improve endurance, improve LE strength and endurance, decrease caregiver burden, improve independence and improve QOL  Continue per current POC     23: Sarah Garcia has attended 22 sessions of physical therapy for CVA with L sided weakness   She returns to therapy after 2 5 weeks off for the holidays and lack of transportation  She continues to make progress toward all long term goals, however may not be demonstrating full progress from lack of therapy for 2 5 weeks prior to re-assessment  Most outcome measures continue to demonstrate statistically significant improvements  From last progress note, 5xSTS has improved from 20 9 seconds to 17 0 seconds, TUG improved from 23 0 seconds to 20 0 seconds with FWW and from 24 0 seconds to 21 3 seconds with SPC, self-selected gait speed with SPC improved from 0 42m/s to 0 50m/s  6MWT improved slightly from 650 feet to 670 feet, which may have showed increased improvement prior to 2 5 week break from therapy  FGA improved from 8/30 to 10/30 with improvements demonstrated with stair negotiation and ambulation with horizontal head turns  Some progress may be limited/floor effect noted with FGA due to requiring assistive device during ambulation  Alexander Carbone continues to demonstrate progress with mobility tasks, endurance, functional mobility, independence, decreasing caregiver burden, improving gait, improving balance, and decreasing fall risk  She continues to be at increased risk of falls from 5xSTS score >15 seconds, TUG time >13 5 seconds, gait speed less than 1 0m/s, and FGA score <22/30  She continues to be in a limited community ambulator category due to decreased ambulation speed, with increased risk of hospitalization and assistance with ADLs/IADLS  She continues to be in the sub-acute phase of CVA where most functional gains and neuroplasticity improvements are greatest  Job Raf would benefit from continued physical therapy to improve functional mobility, decrease fall risk, improve balance, maximize function, improve endurance, and improve gait  Assessment details: Patient presents to outpatient physical therapy s/p CVA on 8/12/22, post inpatient rehabilitation and SNF stay   She presents with impaired L LE strength,  Increased L LE tone and spasticity, decreased endurance, decreased balance, increased fall risk, and requiring assistance for mobility tasks (gait, transfers, bed mobility)  She presents with increased fall risk from increased time to complete 5xSTS of 27 6 seconds, above cut-off score of 15 seconds  She also presents with decreased gait speed of 0 30 m/s, below cut-off score of 1 0m/s for increased risk of falls, and also is below cut-off score for needing assist with ADL's and IADLS (8 9O/W cut-off score), is more likely to be hospitalized (0 6m/s cut-off), and is considered a household walker  She is also considered a fall risk with TUG score of 54 4 seconds, above cut-off score of 13 5 seconds for fall risk  She demonstrates decreased LE strength and endurance from 5xSTS of 27 65 seconds (with min A due to retropulsion)  She also demonstrates decreased functional mobility and endurance with 2MWT of 110 feet, and able to ambulate 130 feet in total before requiring rest break  She would benefit from skilled physical therapy to decrease fall risk, improve balance, improve LE strength and endurance, improve cardiovascular endurance, improve functional mobility, decreased caregiver burden and maximize function  Impairments: abnormal gait, abnormal muscle tone, abnormal or restricted ROM, activity intolerance, impaired balance, impaired physical strength, pain with function and safety issue  Understanding of Dx/Px/POC: good   Prognosis: good         NEW GOALS (2023)  ST  Pt will improve self-selected gait speed to at least 0 60m/s with least restrictive AD within 4 weeks to improve gait and decrease fall risk MET  2  Pt will improve TUG score to at least <18 seconds in 4 weeks to decrease fall risk and improve mobility MET  3  Pt will improve 5xSTS to <15 sec with UE's in 4 weeks to decrease fall risk and improve LE strength and endurance NOT MET  4  Pt will improve 6MWT to at least 720 feet in 4 weeks to improve functional mobility and endurance MET    LT  Pt will improve fast gait speed to at least 0 85m/s within 8 weeks to decrease fall risk and improve mobility  2  Pt will improve TUG score to at least 15 seconds to decrease risk of falls and improve mobility in 8 weeks  3  Pt will improve 5xSTS to be able to complete without UE support in <15 seconds to decrease fall risk and improve LE strength and endurance in 8 weeks  4  Pt will improve 6MWT to at least 800 feet in 8 weeks to improve endurance and functional mobility  5  Pt will improve FGA to at least 15/30 in 8 weeks to improve balance  6  Pt will be independent with HEP in 8 weeks    Plan  Plan details: 2x/week per patient tolerance  Patient would benefit from: skilled physical therapy  Planned therapy interventions: balance, balance/weight bearing training, neuromuscular re-education, motor coordination training, orthotic fitting/training, orthotic management and training, patient education, coordination, strengthening, stretching, flexibility, therapeutic activities, therapeutic training, therapeutic exercise, gait training, transfer training and home exercise program  Duration in weeks: 4  Plan of Care beginning date: 1/9/2023  Plan of Care expiration date: 3/6/2023  Treatment plan discussed with: patient        Subjective Evaluation    History of Present Illness  2/7/23: Alexander Carbone reports she is doing better getting in and out of bed  She states that stairs are better and she was able to walk around [facility] for about 1 hour with her son the other day  She states she feels she continues to improve with therapy  1/9/23: Alexander Carbone reports she still requires some help with getting in and bed when tired  Walking more at home and standing better  Stairs doing good, needs to hold onto railing  Need to work on balance more  Leg is moving better, sometimes feels heavy but doing better  12/5/22: Patient reports that she is doing well  She states that therapy is helping   She states that her balance is doing better  She states that she can stand better with the walker  She reports that she needs less help getting out of bed  She reports that her left leg is moving better and doesn't feel as heavy as before  22: She states that she is making good improvements  She states that she has difficulty with getting out and getting into bed  She states she has a difficult time getting up from a chair  She reports that she needs help getting in and out of bed  She reports that she needs someone with her (home attendant or her son), especially to get up at night  Mechanism of injury: She usually lives alone, now her son is off from work to help her  She states she needs help to move from the bed  She states that she is walking with a walker at home  She states she sometimes uses the wheelchair in the house, but mainly when leaving the house  She has been staying at her home, but went to her son's house yesterday (bathroom 2nd floor)  She states she has difficulty with going down the stairs  She states she has pain in the knee down and the foot feels heavy and asleep on the L side  She states she wants to walk better with the walker  22 with contracted and weak LUE, LLE weakness, L facial droop, mild dysarthria and left sided sensory deficits  CTA showed near occlusive thormbus at R MCA bifurcation, as well as mild beading of the mid to distal ICAs suspicious for mild fibromuscular dysplasia, and mild atherosclerotic disease at bilateral distal carotid arteries  [facility] discharge on 22, discharged to SNF [facility] discharged last week         Pain  Current pain ratin  At best pain ratin  At worst pain ratin  Location: L LE  Quality: cramping  Relieving factors: rest    Social Support  Steps to enter house: no  Stairs in house: no   Lives in: apartment  Lives with: alone    Treatments  Discharged from (in last 30 days): skilled nursing facility  Patient Goals  Patient goal: walk with the walker and get better        Objective       Manual Muscle Testing - Hip Left Right   Flexion 2+ 4+   Extension NT NT   Abduction NT NT   23: L LE Flexion 3/5, Abduction 2+/5,     Manual Muscle Testing - Knee Left Right   Flexion 3+ 4+   Extension 3+ 4+   23: L LE flexion 4-, extension 4-    Manual Muscle Testing - Ankle Left Right   Doriflexion 2- 4+   Plantarflexion NT NT         Coordination Left Right   Alt toe tapping unable Not tested       Sensation Left Right   Kinesthesia NT NT   Light Touch intact intact       Muscle Tone (Modified Sada Scale**) Left Right   Hamstring 2 0   Gastroc 3 0   Quad 2 0   **  0 = no increase in muscle tone  1 = slight increase in muscle tone, minimal resistance at the end of ROM when moved  1+ = minimal resistance throughout the remainder (less than half) of ROM when moved  2 =  David increase in muscle tone through most of the ROM, but still moved easily  3 = considerable increase in muscle tone, movement difficult  4 = affects parts rigid       Balance Test Initial Eval 22 12/   5x Sit to Stand: 27 6 with UE's on FWW, min A 31 22 seconds with UE's, SPV 20 9 second with UE's and SPV 17 03 seconds with UE's and SPV 18 0 seconds with UE's and SPV   TU 4 seconds with FWW 28 3 seconds with FWW and SPV 23 09 seconds with FWW and SPV    24 06 with SPC and CGA 20 09 sec with FWW and SPV    21 34 sec with SPC and CGA 18 9 second with FWW and mod I    17 16 sec with SPC and SPV   Gait Speed:  0 30m/s with FWW and CGA 0 46 m/s with FWW and SPV 0 42m/s with SPC SSGS    0 43m/s with FWW SSGS    0 71 m/s during 6MWT (with FWW) fast gait speed 0 50m/s with SPC SSGS          0 73m/s during 6MWT (with FWW) fast gait speed 0 63 m/s with SPC SSGS            0 77m/s during 6MWT fast gait speed   2 Minute Walk Test: 110 feet with FWW and  feet with FWW and  feet with FWW and SPV NT NT   6 Minute Walk Test: 130 feet in 2min 39 seconds 454 feet with FWW and SPV, completed full 6 minutes 650 feet with FWW in full 6 minutes 670 feet with FWW, full 6 minutes 750 feet with FWW, full 6 minutes   FGA:  NT  8/30, with [facility] 10/30 with [facility] 12/30 with [facility]         Transfers  11/7/22 12/5   Sit To Stand Min Assist SPV SPV   W/C To Bed Mod Assist With FWW, SPV SPV   Sit To Supine Mod Assist Min A at L LE SPV with vc's   Roll Min Assist Min A SPV, with vc's to push through LE         Gait Assessment: Decreased gait speed, decreased step length L>R, decreased heel strike bilaterally  Slightly flexed posture of L LE during swing and initial contact     Precautions: HTN, a-fib, pacemaker      Manuals 1/11 1/16 1/25 1/31 2/2 2/9                                       Neuro Re-Ed      FGA with SPC  TUG with FWW and SPC   Standing marching         sidesteps   // bars (short) with 4# AW on L LE, x 8 laps with single UE support SOLO 4# L AW 1/4 length x 4 laps SOLO 4# L AW  1/4 length x 4    HKM SOLO 3# AW LLE 1/4 length  X 4 laps  // bars (short) with 4# AW on L LE, x 6 laps  Single UE support  SOLO 4# L AW  1/4 length x4      Sit to stand with LE elevated         Step taps         backwards SOLO 3# LLE AW 1/4 length  X 4 laps SOLO  3# AW on L LE  1/4 lap x 3 laps // bars (short) with 4# AW on L LE, x 4 laps  Single UE support SOLO 4# L AW  1/4 length  X 4 laps verbal and tactile cues for bigger LLE step SOLO 4# L AW 1/4 length x 4  VC for increased L step length    hurdles SOLO 3# AW LLE 4" jim (1) and 6" hurdles (3)    Fwd x 1 lap difficulty lifting LLE    3# AW LLE  Over canes (4)  Fwd x 3 laps alt lead Lead     Lat x 3 laps  Increased difficulty lifting LLE over canes landing on cane SOLO  Low hurdles (4) step-to pattern leading with L LE forward x 3 laps (therapist holding down jim from moving)    Low hurdles (3) leading with R LE first x 2 laps // bars (short) with 6-inch hurdles (3) and 4# AW on L LE, forward x 3 laps  Step-to pattern with alternating laps of leading LE  BUE/unilateral UE support SOLO 4# L AW  Low hurdles (4)  Fwd x 3 laps with step to pattern with LLE lean (therapist holding down jim from moving) v c  for picking up LLE for clearing jim    Fwd RLE lead x 2 laps v c  for lifting LLE to clear jim Attempted, however pt became upset stating "I cant do this because my leg is bad  I dont want to do this one"    Step up SOLO 3# LLE AW 4" step  Fwd x 10 ea  Min Ax1 with LLE    SOLO 4# L AW 4" step   up L down R x10 reps with HHA  Tall kneeling         foam         Bolster kick         Bean bag  from floor                           Ther Ex      6MWT 750 feet with FWW   Sit to stand From chair w/arms x 10  CGA  Difficulty scooting forward and putting wt through LLE  Put airex on chair with improved STS From chair with arms x 10 with SPV, cues for increased WB on L LE    Holding ball with both hands over L hip STS while seated on foam pad x 10 reps From low mat table and no/L UE support  Cues to maintain midline, x 15 reps, cues for increased anterior trunk lean From chair no UE use x 10 with SPV to CGA  V c  for increasing anterior trunk lean  From chair no UE 2x10  With CG and cues for anterior trunk lean  From chair with UE's, 3 x 5 reps   nustep         treadmill                                                          Ther Activity         Floor transfer         Bed mobility  Sit to supine and rolling each direction x 5 reps with 3# AW on L LE  Scooting up bed Sit to supine, supine to sit x 10 reps with 4# AW on L LE               Gait Training         Gait with SPC      In hallway with CS x 2 laps  Fast walking with SPC x 2 laps   With FWW in hallway         Gait training on TM SOLO  TM No AW  B/L UE Support    1 6 mph 3% incline x 10 min SOLO  TM no AW  B/L UE support    1  5mph for 5 minutes     (required seated rest break due to shoulder pain)    1 7mph for 5 minutes      SOLO TM W/ 4# L AW B/L UE support    1 5 mph x 5 min  Seated rest break due LLE fatgued  1 5 mph x 5 min    V c  for picking LLE up and for L heel strike     SOLO TM 4# L AW B/L UE support    1 5 mph x 5 min seated rest break due to LLE fatigue    1  5mph x 5 min  No weight    VC for increased L step length and heel strike    Amb without AD         staircase  Up and over staircase with BUE support and 3# AW on L LE, reciprocal pattern x 4 laps    Up and over staircase with BUE support x 3 laps, reciprocal   Education      Purpose of sliders/tennis ball to help with gliding of walker, using SPC with supervision, outcome measures and progress                                  · Access Code: [email or web address]  URL: [email or web address]  Date: 10/13/2022  Prepared by: Fritz House    Exercises   • Seated Hamstring Stretch with Strap - 1 x daily - 7 x weekly - 3 sets - 30 hold  • Seated Gastroc Stretch with Strap - 1 x daily - 7 x weekly - 3 sets - 30 hold  • Seated Long Arc Quad - 1 x daily - 5 x weekly - 3 sets - 10 reps  • Seated March - 1 x daily - 5 x weekly - 3 sets - 10 reps  • Seated Heel Raise - 1 x daily - 5 x weekly - 3 sets - 10 reps  • Seated Hip Adduction Isometrics with Ball - 1 x daily - 5 x weekly - 3 sets - 10 reps - 5 hold  • Seated Hip Abduction - 1 x daily - 5 x weekly - 3 sets - 10 reps  • Seated Toe Raise - 1 x daily - 7 x weekly - 2 sets - 10 reps  • Supine Ankle Pumps - 1 x daily - 5 x weekly - 2 sets - 10 reps  • Supine Heel Slide - 1 x daily - 5 x weekly - 2 sets - 10 reps  • Supine Bridge - 1 x daily - 3 x weekly - 2 sets - 10 reps  • Standing March with Counter Support - 1 x daily - 3 x weekly - 2 sets - 10 reps  • Side Stepping with Counter Support - 1 x daily - 3 x weekly - 5 sets

## 2023-02-09 ENCOUNTER — APPOINTMENT (OUTPATIENT)
Dept: PHYSICAL THERAPY | Facility: CLINIC | Age: 83
End: 2023-02-09

## 2023-02-09 ENCOUNTER — REMOTE DEVICE CLINIC VISIT (OUTPATIENT)
Dept: CARDIOLOGY CLINIC | Facility: CLINIC | Age: 83
End: 2023-02-09

## 2023-02-09 DIAGNOSIS — Z95.0 CARDIAC PACEMAKER IN SITU: Primary | ICD-10-CM

## 2023-02-09 NOTE — PROGRESS NOTES
Results for orders placed or performed in visit on 02/09/23   Cardiac EP device report    Narrative    MDT-SINGLE CHAMBER PPM / NOT MRI CONDITIONAL  CARELINK TRANSMISSION: BATTERY VOLTAGE ADEQUATE (9 1 YRS)   64% (>40%/VVIR 60); ALL AVAILABLE LEAD PARAMETERS WITHIN NORMAL LIMITS  NO SIGNIFICANT HIGH RATE EPISODES  NORMAL DEVICE FUNCTION    ES

## 2023-02-13 ENCOUNTER — CLINICAL SUPPORT (OUTPATIENT)
Dept: INTERNAL MEDICINE CLINIC | Facility: CLINIC | Age: 83
End: 2023-02-13

## 2023-02-13 DIAGNOSIS — E53.8 B12 DEFICIENCY: Chronic | ICD-10-CM

## 2023-02-13 RX ADMIN — CYANOCOBALAMIN 1000 MCG: 1000 INJECTION, SOLUTION INTRAMUSCULAR; SUBCUTANEOUS at 09:19

## 2023-02-13 NOTE — PROGRESS NOTES
Patient seen here today on nurse schedule for b12 injection  Administered 1 mL in patient left deltoid  Patient tolerated well   Patient notified to return in 30 days for b12 injection    NDC: 8625-1970-82

## 2023-02-14 ENCOUNTER — OFFICE VISIT (OUTPATIENT)
Dept: PHYSICAL THERAPY | Facility: CLINIC | Age: 83
End: 2023-02-14

## 2023-02-14 DIAGNOSIS — Z74.09 IMPAIRED FUNCTIONAL MOBILITY, BALANCE, GAIT, AND ENDURANCE: Primary | ICD-10-CM

## 2023-02-14 DIAGNOSIS — Z86.73 HISTORY OF CVA (CEREBROVASCULAR ACCIDENT): ICD-10-CM

## 2023-02-14 NOTE — PROGRESS NOTES
Daily Note     Today's date: 2023  Patient name: Noris Sutton  : 1940  MRN: [id number]  Referring provider: Romelia Ozuna PA-C  Dx:   Encounter Diagnosis     ICD-10-CM    1  Impaired functional mobility, balance, gait, and endurance  Z74 09       2  History of CVA (cerebrovascular accident)  Z86 73           Start Time: 0950  Stop Time: 1032  Total time in clinic (min): 42 minutes    Subjective: Patient reports no new complaints  Objective: See treatment diary below      Assessment: Tolerated treatment well  Radha Lugo participated in skilled PT session focused on wt shifting, gait, and endurance  Patient required verbal and tactile cues for increasing wt to LLE with transfers  Patient continues to require v c  for increasing LLE step length during ambulation  Patient would continue to benefit from skilled PT interventions to address deficits with wt shifting, gait, and endurance   Patient demonstrated fatigue post treatment       Plan: Continue per plan of care  Precautions: HTN, a-fib, pacemaker      Manuals                                        Neuro Re-Ed      FGA with SPC  TUG with FWW and SPC   Standing marching         sidesteps SOLO 4# L AW 1/4 length  X 4 laps v c  for bigger LLE step length  // bars (short) with 4# AW on L LE, x 8 laps with single UE support SOLO 4# L AW 1/4 length x 4 laps SOLO 4# L AW  1/4 length x 4    HKM   // bars (short) with 4# AW on L LE, x 6 laps  Single UE support  SOLO 4# L AW  1/4 length x4      Sit to stand with LE elevated         Step taps SOLO 4# L AW 6" step  Taps w/RLE to increase wt bearing through LLE  2x10        backwards  SOLO  3# AW on L LE  1/4 lap x 3 laps // bars (short) with 4# AW on L LE, x 4 laps   Single UE support SOLO 4# L AW  1/4 length  X 4 laps verbal and tactile cues for bigger LLE step SOLO 4# L AW 1/4 length x 4  VC for increased L step length    hurdles  SOLO  Low hurdles (4) step-to pattern leading with L LE forward x 3 laps (therapist holding down jim from moving)    Low hurdles (3) leading with R LE first x 2 laps // bars (short) with 6-inch hurdles (3) and 4# AW on L LE, forward x 3 laps  Step-to pattern with alternating laps of leading LE  BUE/unilateral UE support SOLO 4# L AW  Low hurdles (4)  Fwd x 3 laps with step to pattern with LLE lean (therapist holding down jim from moving) v c  for picking up LLE for clearing jim    Fwd RLE lead x 2 laps v c  for lifting LLE to clear jim Attempted, however pt became upset stating "I cant do this because my leg is bad  I dont want to do this one"    Step up     SOLO 4# L AW 4" step   up L down R x10 reps with HHA  Tall kneeling         foam         Bolster kick         Bean bag  from floor                           Ther Ex      6MWT 750 feet with FWW   Sit to stand From chair with no arms x 10 w/CG for shifting wt onto LLE    X 10 with foam under RLE to increase wt bearing through LLE  W/CGA for wt shifting towards left  From chair with arms x 10 with SPV, cues for increased WB on L LE    Holding ball with both hands over L hip STS while seated on foam pad x 10 reps From low mat table and no/L UE support  Cues to maintain midline, x 15 reps, cues for increased anterior trunk lean From chair no UE use x 10 with SPV to CGA  V c  for increasing anterior trunk lean  From chair no UE 2x10  With CG and cues for anterior trunk lean  From chair with UE's, 3 x 5 reps   nustep         treadmill                                                          Ther Activity         Floor transfer         Bed mobility  Sit to supine and rolling each direction x 5 reps with 3# AW on L LE   Scooting up bed Sit to supine, supine to sit x 10 reps with 4# AW on L LE               Gait Training         Gait with SPC      In hallway with CS x 2 laps  Fast walking with SPC x 2 laps   With FWW in hallway         Gait training on TM SOLO TM 4# L AW B/L UE support     1 5 mph No incline  x 5 min seated rest break    1  5 mph 3% incline x 5 min    V c  for increasing LLE step length    Total 10 min SOLO  TM no AW  B/L UE support    1  5mph for 5 minutes     (required seated rest break due to shoulder pain)    1 7mph for 5 minutes      SOLO TM W/ 4# L AW B/L UE support    1 5 mph x 5 min  Seated rest break due LLE fatgued  1 5 mph x 5 min    V c  for picking LLE up and for L heel strike     SOLO TM 4# L AW B/L UE support    1 5 mph x 5 min seated rest break due to LLE fatigue    1  5mph x 5 min  No weight    VC for increased L step length and heel strike    Amb without AD         staircase  Up and over staircase with BUE support and 3# AW on L LE, reciprocal pattern x 4 laps    Up and over staircase with BUE support x 3 laps, reciprocal   Education      Purpose of sliders/tennis ball to help with gliding of walker, using SPC with supervision, outcome measures and progress                                  · Access Code: [email or web address]  URL: [email or web address]  Date: 10/13/2022  Prepared by: Nehal Mix    Exercises   • Seated Hamstring Stretch with Strap - 1 x daily - 7 x weekly - 3 sets - 30 hold  • Seated Gastroc Stretch with Strap - 1 x daily - 7 x weekly - 3 sets - 30 hold  • Seated Long Arc Quad - 1 x daily - 5 x weekly - 3 sets - 10 reps  • Seated March - 1 x daily - 5 x weekly - 3 sets - 10 reps  • Seated Heel Raise - 1 x daily - 5 x weekly - 3 sets - 10 reps  • Seated Hip Adduction Isometrics with Ball - 1 x daily - 5 x weekly - 3 sets - 10 reps - 5 hold  • Seated Hip Abduction - 1 x daily - 5 x weekly - 3 sets - 10 reps  • Seated Toe Raise - 1 x daily - 7 x weekly - 2 sets - 10 reps  • Supine Ankle Pumps - 1 x daily - 5 x weekly - 2 sets - 10 reps  • Supine Heel Slide - 1 x daily - 5 x weekly - 2 sets - 10 reps  • Supine Bridge - 1 x daily - 3 x weekly - 2 sets - 10 reps  • Standing March with Counter Support - 1 x daily - 3 x weekly - 2 sets - 10 reps  • Side Stepping with Counter Support - 1 x daily - 3 x weekly - 5 sets

## 2023-02-15 ENCOUNTER — OFFICE VISIT (OUTPATIENT)
Dept: NEUROLOGY | Facility: CLINIC | Age: 83
End: 2023-02-15

## 2023-02-15 VITALS
HEIGHT: 63 IN | TEMPERATURE: 97.5 F | DIASTOLIC BLOOD PRESSURE: 65 MMHG | SYSTOLIC BLOOD PRESSURE: 140 MMHG | WEIGHT: 142 LBS | RESPIRATION RATE: 18 BRPM | BODY MASS INDEX: 25.16 KG/M2 | HEART RATE: 64 BPM

## 2023-02-15 DIAGNOSIS — I48.20 CHRONIC ATRIAL FIBRILLATION (HCC): ICD-10-CM

## 2023-02-15 DIAGNOSIS — R25.2 SPASTICITY: ICD-10-CM

## 2023-02-15 DIAGNOSIS — Z86.73 HISTORY OF CVA (CEREBROVASCULAR ACCIDENT): Primary | ICD-10-CM

## 2023-02-15 DIAGNOSIS — I69.952 SPASTIC HEMIPLEGIA OF LEFT DOMINANT SIDE AS LATE EFFECT OF CEREBROVASCULAR DISEASE, UNSPECIFIED CEREBROVASCULAR DISEASE TYPE (HCC): ICD-10-CM

## 2023-02-15 RX ORDER — BACLOFEN 5 MG/1
TABLET ORAL
Qty: 30 TABLET | Refills: 3 | Status: SHIPPED | OUTPATIENT
Start: 2023-02-15

## 2023-02-15 NOTE — PATIENT INSTRUCTIONS
For ongoing stroke prevention, continue Pradaxa 150mg twice a day, statin, along with appropriate blood pressure and glycemic control   Continue to follow closely with PCP for management of blood pressure, cholesterol and blood sugar   Continue to follow with cardiology for management of atrial fibrillation   Continue physical therapy  Can start baclofen 5mg at bedtime  This is a muscle relaxer  If you feel it is helpful, let me know and we can add another one during the day  Referral to physiatry to consider Botox for left leg spasticity   Follow up in 6 months with vascular neurology or sooner if needed     If you experience any facial droop, weakness on one side of the body, speech or swallowing difficulty, painless loss of vision in one eye, double vision, vertigo that does not resolve quickly/imbalance, go to the ER/call 911  In addition, if you were to have a fall and strike your head, you should be seen in the ER urgently for evaluation due to your use of anticoagulation

## 2023-02-15 NOTE — PROGRESS NOTES
Patient ID: George Kim is a 80 y o  female  Assessment:  80year old female with Afib on AC, SSS s/p pacemaker (not MRI compatible), HTN, HLD, presented to [facility] on 8/12/22 with contracted and weak LUE, LLE weakness, L facial droop, mild dysarthria and left sided sensory deficits  She was on Xarelto at the time, reportedly compliant  CTA showed near occlusive thrombus at R MCA bifurcation, as well as mild beading of the mid to distal ICAs suspicious for mild fibromuscular dysplasia, and mild atherosclerotic disease at bilateral distal carotid arteries  She was not a tPA or thrombectomy candidate  Repeat CTH showed subtle loss of gray-white matter differentiation in the right temporal lobe and insular cortex in keeping with right MCA territory infarction  Concern for Xarelto failure, therefore was transitioned to Pradaxa  She currently denies any new neurologic symptoms to indicate recurrent TIA/CVA  She completed OT and continues in PT  She has made excellent progress in her recovery  Her left sided weakness continues to improve  She has some spasticity on the left side, more in the LLE  I will Rx baclofen 5mg HS and also refer to physiatry to consider Botox  Her therapists had suggested this to her as well  We reviewed secondary stroke prevention  She is compliant with the Pradaxa, no issues with bleeding or excessive bruising  She will continue her statin, which she was on at baseline  She should continue to follow with cardiology for Afib management and PCP for management of BP, lipids and blood sugar  Plan:  -for ongoing stroke prevention, continue Pradaxa 150mg twice a day, statin, along with appropriate blood pressure and glycemic control  -will defer management of blood pressure, cholesterol and blood sugar to the PCP  -continue to follow with cardiology for management of atrial fibrillation  -continue physical therapy  -start baclofen 5mg at bedtime    If noticing improvement, can add 5mg during the day as well, to start   -referral to physiatry to consider Botox for left leg spasticity   -follow up in 6 months or sooner if needed     Signs and symptoms of stroke discussed and included in the AVS  In addition, she was informed that if she were to fall and strike her head, she needs to be seen in the ED urgently for evaluation given her use of anticoagulation  Diagnoses and all orders for this visit:    History of CVA (cerebrovascular accident)  -     Ambulatory Referral to Physiatry; Future  -     Baclofen 5 MG TABS; Take 1 po HS    Chronic atrial fibrillation (HCC)    Spasticity  -     Ambulatory Referral to Physiatry; Future  -     Baclofen 5 MG TABS; Take 1 po HS    Spastic hemiplegia of left dominant side as late effect of cerebrovascular disease, unspecified cerebrovascular disease type ([facility] [address] )           Subjective:    TYSON Chacon is a 80 y o  female with Afib on anticoagulation, sick sinus syndrome s/p pacemaker (not MRI conditional), HTN, and HLD who presents today for follow up regarding prior stroke  To review, [facility] as a stroke alert for contracted and weak LUE, flaccid LLE, left facial droop, mild dysarthria, and mild sensory deficits on the left side  Patient was on Xarelto for her history of AFib and reported compliance  Her last known well was the night prior before bed  When she woke around 6:30 AM, she noted the left arm was contracted without any movement and the left leg was flaccid  She also had a left facial droop  Per EMS, BP normotensive  Upon arrival to the ED, /80  NIHSS 11  CT head showed focal area of gliosis within the high right postcentral gyrus and scattered chronic microvascular ischemic changes with more focal lucency along the anterior limb of the right internal capsule, but no acute hemorrhage   CTA head/neck showed near occlusive thrombus at the right MCA bifurcation, mild beading of the mid to distal ICAs suspicious for mild fibromuscular dysplasia, and mild atherosclerotic disease at bilateral distal carotid arteries  CT cerebral perfusion showed 8 mL mismatch  Patient was not a tPA candidate due to being on anticoagulation  Attending Neurologist discussed case with Dr Lesa Horvath with IR Neurosurgery, and patient was deemed not a thrombectomy candidate  Patient was started on stroke protocol heparin gtt  Repeat CTH revealed subtle loss of gray-white matter differentiation in the right temporal lobe and insular cortex in keeping with right MCA territory infarction  Her pacemaker is not MRI compatible  A1C 5 4, lipid panel , LDL 38  ECHO with EF 65%, moderate left atrial dilation, mild right atrial dilation  Repeat CTA on 8/15 showed evolving right MCA distribution infarct  No evidence for secondary parenchymal hematoma  Interval recanalization of previously seen thrombus at the right MCA bifurcation  Middle cerebral arteries are patent on the current exam without evidence for major branch vessel occlusion  Stable beading of the mid to distal internal carotid arteries again suggestive of mild fibromuscular dysplasia  She was transitioned from Xarelto to Pradaxa  She was transferred to the [facility] on 8/19/22  Neurology was consulted on 9/7/22 while patient was in the [facility] for episodes of head shaking, intermittent nonsensical word substitution, atypical frequent rapid blinking, increased tremulousness  She was noted to have episodes on 9/2, 9/5, and overnight 9/6-9/7  In discussion with patient, she reported maintaining awareness throughout the episodes and is able to recall the episodes  There was low suspicion for seizure, however routine EEG was ordered and completed 9/8/22, and this was normal  Repeat CTH with no new findings  Etiology of these episodes were felt to be behavioral and non-neurologic   There was also concern baclofen was contributing to some of this presentation, therefore it was discontinued  She was eventually discharged from the [facility] on 9/13/22 and went to [address] [place] [address] Drive  She was discharged home on 9/24/22  At timing of HFU visit on 10/3/22, patient reported ongoing left sided weakness, stiffness, and swelling of the LLE  She was staying with her son  She had not had any outpatient therapies since being discharged home from rehab a week prior  Outpatient PT and OT were ordered  Today, she presents with her son  She denies any new neurologic symptoms  She completed OT, remains in outpatient PT at [place] [address]  Her therapists suggested Botox eval for LLE spasticity  She is using a walker to ambulate  She had a minor fall about a month ago due to tripping on something, no injury or head strike  Her son feels she is doing well overall  She notes stiffness in the LLE, swelling as well  She has a little left sided neck stiffness and shoulder pain  She has been compliant with Pradaxa, statin  She is following with PCP, cardiology  She checks BP at home, SPB usually 120-135 and DBP 60-70s  She is sleeping well, eating well  Mood is good  Repeat CTA head and neck 11/18/22 ordered by inpatient neurology  No acute intracranial pathology  Evolution of right MCA infarct  No significant stenosis of the cervical carotid or vertebral arteries  Stable mild beaded irregularity of the cervical internal carotid arteries suggesting FMD   No significant intracranial stenosis, large vessel occlusion or aneurysm  The following portions of the patient's history were reviewed and updated as appropriate: current medications, past family history, past medical history, past social history, past surgical history and problem list        Objective:    Blood pressure 140/65, pulse 64, temperature 97 5 °F (36 4 °C), temperature source Tympanic, resp  rate 18, height 5' 3" (1 6 m), weight 64 4 kg (142 lb)  Physical Exam  Constitutional:       Appearance: Normal appearance     HENT: Head: Normocephalic and atraumatic  Eyes:      Extraocular Movements: EOM normal       Pupils: Pupils are equal, round, and reactive to light  Neurological:      Mental Status: She is alert  Deep Tendon Reflexes:      Reflex Scores:       Bicep reflexes are 2+ on the right side and 3+ on the left side  Brachioradialis reflexes are 2+ on the right side and 3+ on the left side  Patellar reflexes are 2+ on the right side and 3+ on the left side  Achilles reflexes are 2+ on the right side and 3+ on the left side  Psychiatric:         Mood and Affect: Mood normal          Speech: Speech normal          Behavior: Behavior normal          Neurological Exam  Mental Status  Alert  Oriented to person, place, time and situation  Speech is normal  Language is fluent with no aphasia  Attention and concentration are normal     Cranial Nerves  CN II: Visual fields full to confrontation  CN III, IV, VI: Extraocular movements intact bilaterally  Pupils equal round and reactive to light bilaterally  CN V: Facial sensation is normal   CN VII: Full and symmetric facial movement  CN VIII: Hearing is normal   CN IX, X: Palate elevates symmetrically  CN XI: Shoulder shrug strength is normal   CN XII: Tongue midline without atrophy or fasciculations  Motor                                               Right                     Left   Shoulder abduction               5                          5  Elbow flexion                         5                          5-  Elbow extension                    5                          5-  Hip flexion                              5                          4+  Knee flexion                           5                          4  Knee extension                      5                          4+  Dorsiflexion                            5                          3  Increased tone LLE  Left fixation with barrel roll       Sensory  Light touch is normal in upper and lower extremities  Reflexes                                            Right                      Left  Brachioradialis                    2+                         3+  Biceps                                 2+                         3+  Patellar                                2+                         3+  Achilles                                2+                         3+    Coordination  Right: Finger-to-nose normal Left: Finger-to-nose normal     Gait    Using standard walker  Slight circumduction of the left leg  Occasionally would kick the leg of the walker with the left foot   ROS:    Review of Systems   Constitutional: Negative for appetite change, chills and fever  HENT: Negative for ear pain and sore throat  Eyes: Negative for pain and visual disturbance  Respiratory: Negative for cough and shortness of breath  Cardiovascular: Negative for chest pain and palpitations  Gastrointestinal: Negative for abdominal pain and vomiting  Genitourinary: Negative for dysuria and hematuria  Musculoskeletal: Positive for gait problem (pt last fell about a month ago ) and neck pain  Negative for arthralgias and back pain  Skin: Negative for color change and rash  Neurological: Negative for seizures and syncope  All other systems reviewed and are negative      I personally reviewed and updated the ROS as appropriate

## 2023-02-16 ENCOUNTER — OFFICE VISIT (OUTPATIENT)
Dept: PHYSICAL THERAPY | Facility: CLINIC | Age: 83
End: 2023-02-16

## 2023-02-16 DIAGNOSIS — Z74.09 IMPAIRED FUNCTIONAL MOBILITY, BALANCE, GAIT, AND ENDURANCE: Primary | ICD-10-CM

## 2023-02-16 DIAGNOSIS — Z86.73 HISTORY OF CVA (CEREBROVASCULAR ACCIDENT): ICD-10-CM

## 2023-02-16 NOTE — PROGRESS NOTES
Daily Note     Today's date: 2023  Patient name: Edin Su  : 1940  MRN: [id number]  Referring provider: Manish Sapp PA-C  Dx:   Encounter Diagnosis     ICD-10-CM    1  Impaired functional mobility, balance, gait, and endurance  Z74 09       2  History of CVA (cerebrovascular accident)  Z86 73                      Subjective: Patient reports she went to neurology yesterday, states the doctor said she was doing very well  She reports she has pain at her toes because they curl under  Objective: See treatment diary below      Assessment: Tolerated treatment well  Added resisted walking with therapist providing resistance at bilateral clavicles with emphasis on pushing through LE's and increased force generation, improved with repetitions  Performed picking bean bags off floor for balance training with tossing into bucket with weight shifting  Good force generation in bilateral LE's with bending and standing back up, fatigue post  Requires encouragement to perform stepping up and over 6-inch step, improved L LE clearance to step up and when stepping down with repetitions  TM performed at end of session, unavailable during rest of session  Continue to progress as tolerated  Plan: Continue per plan of care  Precautions: HTN, a-fib, pacemaker      Manuals                                            Neuro Re-Ed       FGA with SPC  TUG with FWW and SPC   Standing marching          sidesteps SOLO 4# L AW 1/4 length  X 4 laps v c  for bigger LLE step length SOLO  4# AW on L LE  1/4 lap x 4 laps  // bars (short) with 4# AW on L LE, x 8 laps with single UE support SOLO 4# L AW 1/4 length x 4 laps SOLO 4# L AW  1/4 length x 4    HKM  SOLO  4# AW on L LE, 1/4 lap x 4 laps  // bars (short) with 4# AW on L LE, x 6 laps   Single UE support  SOLO 4# L AW  1/4 length x4      Sit to stand with LE elevated          Step taps SOLO 4# L AW 6" step  Taps w/RLE to increase wt bearing through LLE  2x10         Resisted walking  SOLO  4# AW on L LE, therapist resistance at clavicles with UE support on therapist arms 1/4 lap x 3 laps        backwards    // bars (short) with 4# AW on L LE, x 4 laps  Single UE support SOLO 4# L AW  1/4 length  X 4 laps verbal and tactile cues for bigger LLE step SOLO 4# L AW 1/4 length x 4  VC for increased L step length    hurdles  Refused due to pain  // bars (short) with 6-inch hurdles (3) and 4# AW on L LE, forward x 3 laps  Step-to pattern with alternating laps of leading LE  BUE/unilateral UE support SOLO 4# L AW  Low hurdles (4)  Fwd x 3 laps with step to pattern with LLE lean (therapist holding down jim from moving) v c  for picking up LLE for clearing jim    Fwd RLE lead x 2 laps v c  for lifting LLE to clear jim Attempted, however pt became upset stating "I cant do this because my leg is bad  I dont want to do this one"    Step up  SOLO  4# AW on L LE, 6-inch step up and over x 5 reps each    SOLO 4# L AW 4" step   up L down R x10 reps with HHA  Tall kneeling          foam          Bolster kick          Bean bag  from floor  SOLO  X 20 reps with 4# AW on L LE                            Ther Ex       6MWT 750 feet with FWW   Sit to stand From chair with no arms x 10 w/CG for shifting wt onto LLE    X 10 with foam under RLE to increase wt bearing through LLE  W/CGA for wt shifting towards left  Sit to stand x 10 reps with UE    X 10 reps without UE's arms stretched out  From low mat table and no/L UE support  Cues to maintain midline, x 15 reps, cues for increased anterior trunk lean From chair no UE use x 10 with SPV to CGA  V c  for increasing anterior trunk lean  From chair no UE 2x10  With CG and cues for anterior trunk lean   From chair with UE's, 3 x 5 reps   nustep          treadmill                                                                Ther Activity          Floor transfer          Bed mobility    Sit to supine, supine to sit x 10 reps with 4# AW on L LE                Gait Training          Gait with SPC       In hallway with CS x 2 laps  Fast walking with SPC x 2 laps   With FWW in hallway          Gait training on TM SOLO TM 4# L AW B/L UE support     1 5 mph No incline  x 5 min seated rest break    1  5 mph 3% incline x 5 min    V c  for increasing LLE step length    Total 10 min SOLO TM  4# AW on L LE, BUE support    1  5mph for 5 minutes    Seated rest break       SOLO TM W/ 4# L AW B/L UE support    1 5 mph x 5 min  Seated rest break due LLE fatgued  1 5 mph x 5 min    V c  for picking LLE up and for L heel strike     SOLO TM 4# L AW B/L UE support    1 5 mph x 5 min seated rest break due to LLE fatigue    1  5mph x 5 min  No weight    VC for increased L step length and heel strike    Amb without AD          staircase       Up and over staircase with BUE support x 3 laps, reciprocal   Education       Purpose of sliders/tennis ball to help with gliding of walker, using SPC with supervision, outcome measures and progress                                     · Access Code: [email or web address]  URL: [email or web address]  Date: 10/13/2022  Prepared by: Danny Peraza    Exercises   • Seated Hamstring Stretch with Strap - 1 x daily - 7 x weekly - 3 sets - 30 hold  • Seated Gastroc Stretch with Strap - 1 x daily - 7 x weekly - 3 sets - 30 hold  • Seated Long Arc Quad - 1 x daily - 5 x weekly - 3 sets - 10 reps  • Seated March - 1 x daily - 5 x weekly - 3 sets - 10 reps  • Seated Heel Raise - 1 x daily - 5 x weekly - 3 sets - 10 reps  • Seated Hip Adduction Isometrics with Ball - 1 x daily - 5 x weekly - 3 sets - 10 reps - 5 hold  • Seated Hip Abduction - 1 x daily - 5 x weekly - 3 sets - 10 reps  • Seated Toe Raise - 1 x daily - 7 x weekly - 2 sets - 10 reps  • Supine Ankle Pumps - 1 x daily - 5 x weekly - 2 sets - 10 reps  • Supine Heel Slide - 1 x daily - 5 x weekly - 2 sets - 10 reps  • Supine Bridge - 1 x daily - 3 x weekly - 2 sets - 10 reps  • Standing March with Counter Support - 1 x daily - 3 x weekly - 2 sets - 10 reps  • Side Stepping with Counter Support - 1 x daily - 3 x weekly - 5 sets

## 2023-02-21 ENCOUNTER — TELEPHONE (OUTPATIENT)
Dept: SLEEP CENTER | Facility: CLINIC | Age: 83
End: 2023-02-21

## 2023-02-21 ENCOUNTER — OFFICE VISIT (OUTPATIENT)
Dept: PHYSICAL THERAPY | Facility: CLINIC | Age: 83
End: 2023-02-21

## 2023-02-21 DIAGNOSIS — Z74.09 IMPAIRED FUNCTIONAL MOBILITY, BALANCE, GAIT, AND ENDURANCE: Primary | ICD-10-CM

## 2023-02-21 DIAGNOSIS — Z86.73 HISTORY OF CVA (CEREBROVASCULAR ACCIDENT): ICD-10-CM

## 2023-02-21 NOTE — TELEPHONE ENCOUNTER
----- Message from Gayatri Gore MD sent at 2/19/2023  4:26 PM EST -----  Approved    ----- Message -----  From: Rob Miramontes  Sent: 2/17/2023   9:34 AM EST  To: Sleep [facility] Provider    This Extended Diagnostic sleep study needs approval      If approved please sign and return to clerical pool  If denied please include reasons why  Also provide alternative testing if warranted  Please sign and return to clerical pool

## 2023-02-21 NOTE — PROGRESS NOTES
Daily Note     Today's date: 2023  Patient name: Pankaj Vidales  : 1940  MRN: [id number]  Referring provider: Danay Deng PA-C  Dx:   Encounter Diagnosis     ICD-10-CM    1  Impaired functional mobility, balance, gait, and endurance  Z74 09       2  History of CVA (cerebrovascular accident)  Z86 73                      Subjective: Patient reports her neck is hurting today  Did not want to do TM due to pain  Objective: See treatment diary below      Assessment: Tolerated treatment well  Continued resisted walking and fast walking during session, tolerated well and was able to increase speed with repetitions during session  Improved ability to perform step up and over with repetitions, requires minimal cuing for foot positioning to get entire foot on step and foot clearance when stepping down  Limited toward end of session due to pain in toes, did no want to perform TM ambulation due to pain in foot and neck  Continues to require cues for L foot positioning during sit to stand, difficulty with maintaining knee flexion, however was able to perform sit to stand without UE's when seated on airex  Continue to progress as tolerated  Plan: Continue per plan of care              Precautions: HTN, a-fib, pacemaker      Manuals                                            Neuro Re-Ed       FGA with SPC  TUG with FWW and SPC   Standing marching          sidesteps SOLO 4# L AW 1/4 length  X 4 laps v c  for bigger LLE step length SOLO  4# AW on L LE  1/4 lap x 4 laps   SOLO 4# L AW 1/4 length x 4 laps SOLO 4# L AW  1/4 length x 4    HKM  SOLO  4# AW on L LE, 1/4 lap x 4 laps SOLO  4# AW on L LE, 1/4 lap x 4 laps   SOLO 4# L AW  1/4 length x4      Sit to stand with LE elevated          Step taps SOLO 4# L AW 6" step  Taps w/RLE to increase wt bearing through LLE  2x10         Resisted walking  SOLO  4# AW on L LE, therapist resistance at clavicles with UE support on therapist arms 1/4 lap x 3 laps SOLO  Fast walking 1/4 lap x 4 laps    4# AW and resistance at clavicles 1/4 lap x 4 laps    4# AW and resistance with blue TB at harness 1/4 lap x 2 laps       backwards   SOLO  4# AW 1/4 lap x 2 laps    SOLO 4# L AW  1/4 length  X 4 laps verbal and tactile cues for bigger LLE step SOLO 4# L AW 1/4 length x 4  VC for increased L step length    hurdles  Refused due to pain   SOLO 4# L AW  Low hurdles (4)  Fwd x 3 laps with step to pattern with LLE lean (therapist holding down jim from moving) v c  for picking up LLE for clearing jim    Fwd RLE lead x 2 laps v c  for lifting LLE to clear jim Attempted, however pt became upset stating "I cant do this because my leg is bad  I dont want to do this one"    Step up  SOLO  4# AW on L LE, 6-inch step up and over x 5 reps each SOLO  4# AW on L LE, 6-inch step up and over x 4 reps leading with L, x 6 reps leading with R   SOLO 4# L AW 4" step   up L down R x10 reps with HHA  Tall kneeling          foam          Bolster kick          Bean bag  from floor  SOLO  X 20 reps with 4# AW on L LE                            Ther Ex       6MWT 750 feet with FWW   Sit to stand From chair with no arms x 10 w/CG for shifting wt onto LLE    X 10 with foam under RLE to increase wt bearing through LLE  W/CGA for wt shifting towards left  Sit to stand x 10 reps with UE    X 10 reps without UE's arms stretched out Sit to stand seated on foam no UE's x 12 reps  From chair no UE use x 10 with SPV to CGA  V c  for increasing anterior trunk lean  From chair no UE 2x10  With CG and cues for anterior trunk lean   From chair with UE's, 3 x 5 reps   nustep          treadmill                                                                Ther Activity          Floor transfer          Bed mobility                    Gait Training          Gait with SPC       In hallway with CS x 2 laps  Fast walking with SPC x 2 laps   With FWW in hallway          Gait training on TM SOLO TM 4# L AW B/L UE support     1 5 mph No incline  x 5 min seated rest break    1  5 mph 3% incline x 5 min    V c  for increasing LLE step length    Total 10 min SOLO TM  4# AW on L LE, BUE support    1  5mph for 5 minutes    Seated rest break       SOLO TM W/ 4# L AW B/L UE support    1 5 mph x 5 min  Seated rest break due LLE fatgued  1 5 mph x 5 min    V c  for picking LLE up and for L heel strike     SOLO TM 4# L AW B/L UE support    1 5 mph x 5 min seated rest break due to LLE fatigue    1  5mph x 5 min  No weight    VC for increased L step length and heel strike    Amb without AD          staircase       Up and over staircase with BUE support x 3 laps, reciprocal   Education       Purpose of sliders/tennis ball to help with gliding of walker, using SPC with supervision, outcome measures and progress                                     · Access Code: [email or web address]  URL: [email or web address]  Date: 10/13/2022  Prepared by: Jonette Ormond    Exercises   • Seated Hamstring Stretch with Strap - 1 x daily - 7 x weekly - 3 sets - 30 hold  • Seated Gastroc Stretch with Strap - 1 x daily - 7 x weekly - 3 sets - 30 hold  • Seated Long Arc Quad - 1 x daily - 5 x weekly - 3 sets - 10 reps  • Seated March - 1 x daily - 5 x weekly - 3 sets - 10 reps  • Seated Heel Raise - 1 x daily - 5 x weekly - 3 sets - 10 reps  • Seated Hip Adduction Isometrics with Ball - 1 x daily - 5 x weekly - 3 sets - 10 reps - 5 hold  • Seated Hip Abduction - 1 x daily - 5 x weekly - 3 sets - 10 reps  • Seated Toe Raise - 1 x daily - 7 x weekly - 2 sets - 10 reps  • Supine Ankle Pumps - 1 x daily - 5 x weekly - 2 sets - 10 reps  • Supine Heel Slide - 1 x daily - 5 x weekly - 2 sets - 10 reps  • Supine Bridge - 1 x daily - 3 x weekly - 2 sets - 10 reps  • Standing March with Counter Support - 1 x daily - 3 x weekly - 2 sets - 10 reps  • Side Stepping with Counter Support - 1 x daily - 3 x weekly - 5 sets

## 2023-02-23 ENCOUNTER — APPOINTMENT (OUTPATIENT)
Dept: PHYSICAL THERAPY | Facility: CLINIC | Age: 83
End: 2023-02-23

## 2023-02-27 ENCOUNTER — APPOINTMENT (OUTPATIENT)
Dept: PHYSICAL THERAPY | Facility: CLINIC | Age: 83
End: 2023-02-27

## 2023-03-02 ENCOUNTER — OFFICE VISIT (OUTPATIENT)
Dept: PHYSICAL THERAPY | Facility: CLINIC | Age: 83
End: 2023-03-02

## 2023-03-02 DIAGNOSIS — Z74.09 IMPAIRED FUNCTIONAL MOBILITY, BALANCE, GAIT, AND ENDURANCE: Primary | ICD-10-CM

## 2023-03-02 DIAGNOSIS — Z86.73 HISTORY OF CVA (CEREBROVASCULAR ACCIDENT): ICD-10-CM

## 2023-03-02 NOTE — PROGRESS NOTES
Daily Note     Today's date: 3/2/2023  Patient name: Tom Liz  : 1940  MRN: [id number]  Referring provider: Kedar Casiano PA-C  Dx:   Encounter Diagnosis     ICD-10-CM    1  Impaired functional mobility, balance, gait, and endurance  Z74 09       2  History of CVA (cerebrovascular accident)  Z86 73                      Subjective: Patient reports she has been having a lot of neck pain  She reports she has an appointment for her neck but not for a long time  She states she continues to have pain in her toes, not too bad right now  Objective: See treatment diary below      Assessment: Tolerated treatment well  Improved push with resisted walking  Added resisted leaning to attempt to increased anterior trunk lean for sit to stand, some improvement with STS, however improper sequencing of trunk extensors to initiate standing  Performed 2 trials of 5 minutes on TM during session without weight on ankle due to increased fatigue  Continues to have limitations with exercises due to pain at L LE, especially toes  Discussed tone management techniques of stretching (having someone to help with ankle and toe stretching) and weight bearing and standing for tone management  States that she saw a doctor for something to put on her toes, but did not receive  States that she doesn't have a follow up until August when asked about potential botox injection  Improved step length with backwards walking with repetitions  Fatigued at end of session and did not want to perform additional standing exercises, therapist performed manual stretching in sitting with some pain relief  Continue to progress as tolerated  Plan: Continue per plan of care              Precautions: HTN, a-fib, pacemaker      Manuals  3/2                                    Neuro Re-Ed        Standing marching        sidesteps SOLO 4# L AW 1/4 length  X 4 laps v c  for bigger LLE step length SOLO  4# AW on L LE  1/4 lap x 4 laps SOLO  4# AW on L LE, 1/4 lap x 1 lap    HKM  SOLO  4# AW on L LE, 1/4 lap x 4 laps SOLO  4# AW on L LE, 1/4 lap x 4 laps     Sit to stand with LE elevated        Step taps SOLO 4# L AW 6" step  Taps w/RLE to increase wt bearing through LLE  2x10       Resisted walking  SOLO  4# AW on L LE, therapist resistance at clavicles with UE support on therapist arms 1/4 lap x 3 laps SOLO  Fast walking 1/4 lap x 4 laps    4# AW and resistance at clavicles 1/4 lap x 4 laps    4# AW and resistance with blue TB at harness 1/4 lap x 2 laps SOLO  Resistance at clavicles 1/4 lap x 4 laps, no AW    backwards   SOLO  4# AW 1/4 lap x 2 laps   SOLO  4# AW 1/4 lap x 3 laps    hurdles  Refused due to pain      Step up  SOLO  4# AW on L LE, 6-inch step up and over x 5 reps each SOLO  4# AW on L LE, 6-inch step up and over x 4 reps leading with L, x 6 reps leading with R     Tall kneeling        foam        Bolster kick        Bean bag  from floor  SOLO  X 20 reps with 4# AW on L LE      Resisted lean    Forward and then to stand x 10 reps then STS without UE support x 5 reps            Ther Ex        Sit to stand From chair with no arms x 10 w/CG for shifting wt onto LLE    X 10 with foam under RLE to increase wt bearing through LLE  W/CGA for wt shifting towards left  Sit to stand x 10 reps with UE    X 10 reps without UE's arms stretched out Sit to stand seated on foam no UE's x 12 reps     nustep        treadmill        Manual stretching    L LE ankle PROM, toe extension x 5 minutes                                        Ther Activity        Floor transfer        Bed mobility                Gait Training        Gait with SPC        With FWW in hallway        Gait training on TM SOLO TM 4# L AW B/L UE support     1 5 mph No incline  x 5 min seated rest break    1  5 mph 3% incline x 5 min    V c  for increasing LLE step length    Total 10 min SOLO TM  4# AW on L LE, BUE support    1  5mph for 5 minutes    Seated rest break SOLO TM  1  5mph for 5 minutes with BUE support, requested to stop due to fatigue, SpO2 99%, HR to 91    Seated break before 5 more minutes at 1  5mph    Total 10 minutes    Amb without AD        staircase        Education                                    · Access Code: [email or web address]  URL: [email or web address]  Date: 10/13/2022  Prepared by: Lula Colon    Exercises   • Seated Hamstring Stretch with Strap - 1 x daily - 7 x weekly - 3 sets - 30 hold  • Seated Gastroc Stretch with Strap - 1 x daily - 7 x weekly - 3 sets - 30 hold  • Seated Long Arc Quad - 1 x daily - 5 x weekly - 3 sets - 10 reps  • Seated March - 1 x daily - 5 x weekly - 3 sets - 10 reps  • Seated Heel Raise - 1 x daily - 5 x weekly - 3 sets - 10 reps  • Seated Hip Adduction Isometrics with Ball - 1 x daily - 5 x weekly - 3 sets - 10 reps - 5 hold  • Seated Hip Abduction - 1 x daily - 5 x weekly - 3 sets - 10 reps  • Seated Toe Raise - 1 x daily - 7 x weekly - 2 sets - 10 reps  • Supine Ankle Pumps - 1 x daily - 5 x weekly - 2 sets - 10 reps  • Supine Heel Slide - 1 x daily - 5 x weekly - 2 sets - 10 reps  • Supine Bridge - 1 x daily - 3 x weekly - 2 sets - 10 reps  • Standing March with Counter Support - 1 x daily - 3 x weekly - 2 sets - 10 reps  • Side Stepping with Counter Support - 1 x daily - 3 x weekly - 5 sets

## 2023-03-06 ENCOUNTER — EVALUATION (OUTPATIENT)
Dept: PHYSICAL THERAPY | Facility: CLINIC | Age: 83
End: 2023-03-06

## 2023-03-06 DIAGNOSIS — Z74.09 IMPAIRED FUNCTIONAL MOBILITY, BALANCE, GAIT, AND ENDURANCE: Primary | ICD-10-CM

## 2023-03-06 DIAGNOSIS — Z86.73 HISTORY OF CVA (CEREBROVASCULAR ACCIDENT): ICD-10-CM

## 2023-03-06 NOTE — PROGRESS NOTES
Re-Evaluation        Today's date: 3/6/2023  Patient name: Wolf Chacon  : 1940  MRN: [id number]  Referring provider: Karla Wilkerson PA-C  Dx:   Encounter Diagnosis     ICD-10-CM    1  Impaired functional mobility, balance, gait, and endurance  Z74 09       2  History of CVA (cerebrovascular accident)  Z86 73           Start Time: 948  Stop Time: 1045  Total time in clinic (min): 57 minutes     Assessment  3/6/23: Andrés Mata has attended 33 sessions of physical therapy for CVA with L sided weakness  Andrés Mata continues to make progress with balance outcomes, and was able to perform 6MWT and FGA testing without using AD with CS during session  Since last progress note, FGA improved from  to 15/30  Slight decrease in 6MWT distance, however was able to perform without AD with CS/CGA  Other outcome measures have maintained prior levels  Andrés Mata has been limited with progress of therapy due to shoulder, neck, and foot pain  She will be following up with doctors regarding pain, and has discussed possible botox injections due to spasticity in L LE and toes  Andrés Mata wishes to take a break to therapy at this time, discussed follow up in 1 month to assess progress with HEP, assess any changes in outcome measures at follow up  Reviewed HEP and scheduled follow up appointment  Patient agreeable to plan  23: Andrés Mata has attended 28 sessions of physical therapy for CVA with L sided weakness  She continues to make progress with therapy since last progress note  She has met 3/4 short term goals at this time  Self-selected gait speed continues to improve, from 0 50m/s with SPC to 0 63m/s with SPC, 6MWT improved from 670 feet to 750 feet, TUG improved from 21 3 seconds to 17 1 seconds with SPC and from 20 0 sec to 18 9 sec with FWW   FGA continues to improve from 10/30 to  (requires SPC during testing which may be showing slight floor effect), demonstrating improvement of ambulation with eyes closed and changing speeds  She is demonstrating improving confidence with ambulation with SPC  She continues to be at increased risk of falls from self-selected gait speed below 1 0m/s, TUG greater than 13 5 seconds, 5xSTS greater than 15 seconds, and FGA below 22/30  She is now above cut-off scores for gait speed at 0 6m/s no longer placing her at increased risk of hospitalization and requiring assistance for ADL's and IADLS  She continues to demonstrate progress with therapy and would benefit from continued physical therapy to decrease fall risk, improve balance, improve functional mobility, improve endurance, improve LE strength and endurance, decrease caregiver burden, improve independence and improve QOL  Continue per current POC  Assessment details: Patient presents to outpatient physical therapy s/p CVA on 8/12/22, post inpatient rehabilitation and SNF stay  She presents with impaired L LE strength,  Increased L LE tone and spasticity, decreased endurance, decreased balance, increased fall risk, and requiring assistance for mobility tasks (gait, transfers, bed mobility)  She presents with increased fall risk from increased time to complete 5xSTS of 27 6 seconds, above cut-off score of 15 seconds  She also presents with decreased gait speed of 0 30 m/s, below cut-off score of 1 0m/s for increased risk of falls, and also is below cut-off score for needing assist with ADL's and IADLS (3 4W/T cut-off score), is more likely to be hospitalized (0 6m/s cut-off), and is considered a household walker  She is also considered a fall risk with TUG score of 54 4 seconds, above cut-off score of 13 5 seconds for fall risk  She demonstrates decreased LE strength and endurance from 5xSTS of 27 65 seconds (with min A due to retropulsion)  She also demonstrates decreased functional mobility and endurance with 2MWT of 110 feet, and able to ambulate 130 feet in total before requiring rest break   She would benefit from skilled physical therapy to decrease fall risk, improve balance, improve LE strength and endurance, improve cardiovascular endurance, improve functional mobility, decreased caregiver burden and maximize function  Impairments: abnormal gait, abnormal muscle tone, abnormal or restricted ROM, activity intolerance, impaired balance, impaired physical strength, pain with function and safety issue  Understanding of Dx/Px/POC: good   Prognosis: good         NEW GOALS (2023)  ST  Pt will improve self-selected gait speed to at least 0 60m/s with least restrictive AD within 4 weeks to improve gait and decrease fall risk MET  2  Pt will improve TUG score to at least <18 seconds in 4 weeks to decrease fall risk and improve mobility MET  3  Pt will improve 5xSTS to <15 sec with UE's in 4 weeks to decrease fall risk and improve LE strength and endurance NOT MET  4  Pt will improve 6MWT to at least 720 feet in 4 weeks to improve functional mobility and endurance MET    LT  Pt will improve fast gait speed to at least 0 85m/s within 8 weeks to decrease fall risk and improve mobility NOT MET  2  Pt will improve TUG score to at least 15 seconds to decrease risk of falls and improve mobility in 8 weeks ALMOST MET  3  Pt will improve 5xSTS to be able to complete without UE support in <15 seconds to decrease fall risk and improve LE strength and endurance in 8 weeks NOT MET  4  Pt will improve 6MWT to at least 800 feet in 8 weeks to improve endurance and functional mobility NOT MET  5  Pt will improve FGA to at least 15/30 in 8 weeks to improve balance MET  6  Pt will be independent with HEP in 8 weeks MET    Plan  Plan to follow up in 30 days for re-testing to assess how HEP is going, and any progression/regression of function  Will determine further goals if necessary at follow up session  Scheduled 23           Subjective Evaluation    History of Present Illness  3/6/23: Mery Son reports that she continues to have pain at her shoulder, neck, and her L toes  She reports that therapy continues to help some  She reports that she would like to take a break from therapy at this time  No falls reported  She reports that she continues to need some assistance from her caregiver  Mechanism of injury: She usually lives alone, now her son is off from work to help her  She states she needs help to move from the bed  She states that she is walking with a walker at home  She states she sometimes uses the wheelchair in the house, but mainly when leaving the house  She has been staying at her home, but went to her son's house yesterday (bathroom 2nd floor)  She states she has difficulty with going down the stairs  She states she has pain in the knee down and the foot feels heavy and asleep on the L side  She states she wants to walk better with the walker  22 with contracted and weak LUE, LLE weakness, L facial droop, mild dysarthria and left sided sensory deficits  CTA showed near occlusive thormbus at R MCA bifurcation, as well as mild beading of the mid to distal ICAs suspicious for mild fibromuscular dysplasia, and mild atherosclerotic disease at bilateral distal carotid arteries  [facility] discharge on 22, discharged to SNF [facility]), discharged last week         Pain  Current pain ratin  At best pain ratin  At worst pain ratin  Location: L LE  Quality: cramping  Relieving factors: rest    Social Support  Steps to enter house: no  Stairs in house: no   Lives in: apartment  Lives with: alone    Treatments  Discharged from (in last 30 days): skilled nursing facility  Patient Goals  Patient goal: walk with the walker and get better        Objective       Manual Muscle Testing - Hip Left Right   Flexion 2+ 4+   Extension NT NT   Abduction NT NT   23: L LE Flexion 3/5, Abduction 2+/5,     Manual Muscle Testing - Knee Left Right   Flexion 3+ 4+   Extension 3+ 4+   23: L LE flexion 4-, extension 4-    Manual Muscle Testing - Ankle Left Right   Doriflexion 2- 4+   Plantarflexion NT NT         Coordination Left Right   Alt toe tapping unable Not tested       Sensation Left Right   Kinesthesia NT NT   Light Touch intact intact       Muscle Tone (Modified Sada Scale**) Left Right   Hamstring 2 0   Gastroc 3 0   Quad 2 0   **  0 = no increase in muscle tone  1 = slight increase in muscle tone, minimal resistance at the end of ROM when moved  1+ = minimal resistance throughout the remainder (less than half) of ROM when moved  2 =  David increase in muscle tone through most of the ROM, but still moved easily  3 = considerable increase in muscle tone, movement difficult  4 = affects parts rigid       Balance Test Initial Eval 11/7/22 12/5 1/9 2/7 3/6   5x Sit to Stand: 27 6 with UE's on FWW, min A 31 22 seconds with UE's, SPV 20 9 second with UE's and SPV 17 03 seconds with UE's and SPV 18 0 seconds with UE's and SPV 18 0 seconds with UE's and SPV   TU 4 seconds with FWW 28 3 seconds with FWW and SPV 23 09 seconds with FWW and SPV    24 06 with SPC and CGA 20 09 sec with FWW and SPV    21 34 sec with SPC and CGA 18 9 second with FWW and mod I    17 16 sec with SPC and SPV       16 16 sec with SPC and SPV   Gait Speed:  0 30m/s with FWW and CGA 0 46 m/s with FWW and SPV 0 42m/s with SPC SSGS    0 43m/s with FWW SSGS    0 71 m/s during 6MWT (with FWW) fast gait speed 0 50m/s with SPC SSGS          0 73m/s during 6MWT (with FWW) fast gait speed 0 63 m/s with SPC SSGS            0 77m/s during 6MWT fast gait speed 0 64m/s with SPC SSGS    SSGS  0 69m/s no AD    0 77m/s during 6MWT fast gait speed with FWW   2 Minute Walk Test: 110 feet with FWW and  feet with FWW and  feet with FWW and SPV NT NT    6 Minute Walk Test: 130 feet in 2min 39 seconds 454 feet with FWW and SPV, completed full 6 minutes 650 feet with FWW in full 6 minutes 670 feet with FWW, full 6 minutes 750 feet with FWW, full 6 minutes 660 feet without AD (gait belt) in 6 minutes    700 feet with FWW (at end of session)       FGA:  NT  8/30, with [facility] 10/30 with [facility] 12/30 with SPC 15/30, able to perform without AD   3MBWT      No AD, 15 8 seconds         Transfers  11/7/22 12/5   Sit To Stand Min Assist SPV SPV   W/C To Bed Mod Assist With FWW, SPV SPV   Sit To Supine Mod Assist Min A at L LE SPV with vc's   Roll Min Assist Min A SPV, with vc's to push through LE         Gait Assessment: Decreased gait speed, decreased step length L>R, decreased heel strike bilaterally   Slightly flexed posture of L LE during swing and initial contact     Precautions: HTN, a-fib, pacemaker      Manuals 2/14 2/16 2/21 3/2 3/6                                   Neuro Re-Ed     FGA 15/30  3MBWT   Fast walking in hallway x 2 laps  TUG x 3 reps with SPC   tandem     In hallway 30' x 2 with CGA/min A   Standing marching        sidesteps SOLO 4# L AW 1/4 length  X 4 laps v c  for bigger LLE step length SOLO  4# AW on L LE  1/4 lap x 4 laps  SOLO  4# AW on L LE, 1/4 lap x 1 lap    HKM  SOLO  4# AW on L LE, 1/4 lap x 4 laps SOLO  4# AW on L LE, 1/4 lap x 4 laps     Sit to stand with LE elevated        Step taps SOLO 4# L AW 6" step  Taps w/RLE to increase wt bearing through LLE  2x10       Resisted walking  SOLO  4# AW on L LE, therapist resistance at clavicles with UE support on therapist arms 1/4 lap x 3 laps SOLO  Fast walking 1/4 lap x 4 laps    4# AW and resistance at clavicles 1/4 lap x 4 laps    4# AW and resistance with blue TB at harness 1/4 lap x 2 laps SOLO  Resistance at clavicles 1/4 lap x 4 laps, no AW    backwards   SOLO  4# AW 1/4 lap x 2 laps   SOLO  4# AW 1/4 lap x 3 laps    hurdles  Refused due to pain   Stepping over shoe box x 3 reps   Step up  SOLO  4# AW on L LE, 6-inch step up and over x 5 reps each SOLO  4# AW on L LE, 6-inch step up and over x 4 reps leading with L, x 6 reps leading with R     Tall kneeling        foam        Bolster kick        Bean bag  from floor  SOLO  X 20 reps with 4# AW on L LE      Resisted lean    Forward and then to stand x 10 reps then STS without UE support x 5 reps            Ther Ex     6MWT without AD  6MWT with FWW     Sit to stand From chair with no arms x 10 w/CG for shifting wt onto LLE    X 10 with foam under RLE to increase wt bearing through LLE  W/CGA for wt shifting towards left  Sit to stand x 10 reps with UE    X 10 reps without UE's arms stretched out Sit to stand seated on foam no UE's x 12 reps  STS 5 reps x 4 with UE's, cues for foot positioning and hand positioning   nustep        treadmill        Manual stretching    L LE ankle PROM, toe extension x 5 minutes                                        Ther Activity        Floor transfer        Bed mobility                Gait Training        Gait with SPC        With FWW in hallway        Gait training on TM SOLO TM 4# L AW B/L UE support     1 5 mph No incline  x 5 min seated rest break    1  5 mph 3% incline x 5 min    V c  for increasing LLE step length    Total 10 min SOLO TM  4# AW on L LE, BUE support    1  5mph for 5 minutes    Seated rest break      SOLO TM  1  5mph for 5 minutes with BUE support, requested to stop due to fatigue, SpO2 99%, HR to 91    Seated break before 5 more minutes at 1  5mph    Total 10 minutes    Amb without AD        staircase        Education     30 day hold, HEP review and print out, progress with therapy                               Access Code: [email or web address]  URL: [email or web address]  Date: 03/06/2023  Prepared by: Chavo Gonzalez    Exercises  • Seated Hamstring Stretch with Strap - 1 x daily - 7 x weekly - 3 sets - 30 hold  • Seated Gastroc Stretch with Strap - 1 x daily - 7 x weekly - 3 sets - 30 hold  • Seated Long Arc Quad - 1 x daily - 5 x weekly - 3 sets - 10 reps  • Seated March - 1 x daily - 5 x weekly - 3 sets - 10 reps  • Seated Heel Raise - 1 x daily - 5 x weekly - 3 sets - 10 reps  • Seated Hip Adduction Isometrics with Ball - 1 x daily - 5 x weekly - 3 sets - 10 reps - 5 hold  • Seated Hip Abduction - 1 x daily - 5 x weekly - 3 sets - 10 reps  • Seated Toe Raise - 1 x daily - 7 x weekly - 2 sets - 10 reps  • Supine Ankle Pumps - 1 x daily - 5 x weekly - 2 sets - 10 reps  • Supine Heel Slide - 1 x daily - 5 x weekly - 2 sets - 10 reps  • Supine Bridge - 1 x daily - 3 x weekly - 2 sets - 10 reps  • Standing March with Counter Support - 1 x daily - 3 x weekly - 2 sets - 10 reps  • Side Stepping with Counter Support - 1 x daily - 3 x weekly - 5 sets  • Backward Walking with Counter Support - 1 x daily - 4 x weekly - 5 sets  • Mini Squat with Counter Support - 1 x daily - 4 x weekly - 3 sets - 10 reps  • Sit to Stand with Hands on Knees - 1 x daily - 4 x weekly - 2 sets - 10 reps

## 2023-03-09 ENCOUNTER — HOSPITAL ENCOUNTER (OUTPATIENT)
Dept: SLEEP CENTER | Facility: CLINIC | Age: 83
Discharge: HOME | End: 2023-03-09

## 2023-03-09 DIAGNOSIS — I27.20 PULMONARY HYPERTENSION (HCC): ICD-10-CM

## 2023-03-10 ENCOUNTER — APPOINTMENT (OUTPATIENT)
Dept: PHYSICAL THERAPY | Facility: CLINIC | Age: 83
End: 2023-03-10

## 2023-03-10 NOTE — PROGRESS NOTES
Sleep Study Documentation    Pre-Sleep Study       Sleep testing procedure explained to patient:YES    Patient napped prior to study:NO    Caffeine:Dayshift worker after 12PM   Caffeine use:YES- coffee  6 ounces    Alcohol:Dayshift workers after 5PM: Alcohol use:NO    Typical day for patient:YES       Study Documentation    Sleep Study Indications: Pulmonary hypertension, EDS    Sleep Study: Diagnostic   Snore: Moderate  Supplemental O2: no    O2 flow rate (L/min) range   O2 flow rate (L/min) final   Minimum SaO2 71%  Baseline SaO2 92 9%        Mode of Therapy:  EKG abnormalities: no     EEG abnormalities: no    Sleep Study Recorded < 2 hours: N/A    Sleep Study Recorded > 2 hours but incomplete study: N/A    Sleep Study Recorded 6 hours but no sleep obtained: NO    Patient classification: retired       Post-Sleep Study    Medication used at bedtime or during sleep study:YES other prescription medications    Patient reports time it took to fall asleep:20 to 30 minutes    Patient reports waking up during study:3 or more times  Patient reports returning to sleep in 10 to 30 minutes  Patient reports sleeping 2 to 4 hours without dreaming  Patient reports sleep during study:better than usual    Patient rated sleepiness: Very sleepy or tired    PAP treatment:no

## 2023-03-13 ENCOUNTER — CLINICAL SUPPORT (OUTPATIENT)
Dept: INTERNAL MEDICINE CLINIC | Facility: CLINIC | Age: 83
End: 2023-03-13

## 2023-03-13 DIAGNOSIS — E53.8 B12 DEFICIENCY: Chronic | ICD-10-CM

## 2023-03-13 RX ORDER — CYANOCOBALAMIN 1000 UG/ML
1000 INJECTION, SOLUTION INTRAMUSCULAR; SUBCUTANEOUS
Qty: 3 ML | Refills: 0 | Status: SHIPPED | OUTPATIENT
Start: 2023-03-13 | End: 2023-06-11

## 2023-03-13 RX ADMIN — CYANOCOBALAMIN 1000 MCG: 1000 INJECTION, SOLUTION INTRAMUSCULAR; SUBCUTANEOUS at 09:16

## 2023-03-13 NOTE — PROGRESS NOTES
Patient seen here today on nurse schedule for B-12 injection  Administered 1 mL in patient left deltoid  Patient tolerated well   Patient notified to return in 30 days for next injection      NDC: 2187-7147-18

## 2023-03-14 ENCOUNTER — APPOINTMENT (OUTPATIENT)
Dept: PHYSICAL THERAPY | Facility: CLINIC | Age: 83
End: 2023-03-14

## 2023-03-16 ENCOUNTER — APPOINTMENT (OUTPATIENT)
Dept: PHYSICAL THERAPY | Facility: CLINIC | Age: 83
End: 2023-03-16

## 2023-03-20 ENCOUNTER — TELEPHONE (OUTPATIENT)
Dept: SLEEP CENTER | Facility: CLINIC | Age: 83
End: 2023-03-20

## 2023-03-20 DIAGNOSIS — G47.33 OSA (OBSTRUCTIVE SLEEP APNEA): Primary | ICD-10-CM

## 2023-03-20 NOTE — TELEPHONE ENCOUNTER
Sleep study resulted and shows severe RICH (AHI 51 6) and hypoxemia  Per study order, Dr Papo Adler, internal medicine, requests follow up with sleep specialist     Dr Karen oLckwood read the test and is requesting the consult be expedited  Left message for patient to call office to review sleep study results and schedule consult

## 2023-03-27 ENCOUNTER — OFFICE VISIT (OUTPATIENT)
Dept: MULTI SPECIALTY CLINIC | Facility: CLINIC | Age: 83
End: 2023-03-27

## 2023-03-27 VITALS
HEART RATE: 69 BPM | DIASTOLIC BLOOD PRESSURE: 69 MMHG | BODY MASS INDEX: 24.66 KG/M2 | OXYGEN SATURATION: 99 % | SYSTOLIC BLOOD PRESSURE: 159 MMHG | WEIGHT: 139.2 LBS | TEMPERATURE: 97.3 F

## 2023-03-27 DIAGNOSIS — Z86.73 HISTORY OF CVA (CEREBROVASCULAR ACCIDENT): ICD-10-CM

## 2023-03-27 DIAGNOSIS — M21.372 FOOT DROP, LEFT FOOT: Primary | ICD-10-CM

## 2023-03-27 DIAGNOSIS — B35.1 ONYCHOMYCOSIS: ICD-10-CM

## 2023-03-27 NOTE — PROGRESS NOTES
Podiatry Clinic  Mary Kay Hainse 80 y o  female MRN: [id number]  Encounter: [id number]      Assessment/Plan        Diagnoses and all orders for this visit:    Foot drop, left foot  -     Brace    History of CVA (cerebrovascular accident)  -     Brace    Onychomycosis  -     Nail debridement       Plan:  • Patient was seen/examined  All questions and concerns addressed  • AFO for left sided foot drop prescribed and sent to [facility]  Patient will obtain and trial for relief of pain/difficulty ambulating due to her deformity  • Will reassess left foot drop and success of AFO Brace at next appointment  • Mycotic nails x2 debrided using large nail nipper, without incident  Nails of digits 2-5 bilaterally trimmed to normal length  • RTC in 3-4 month(s)          Nail debridement     Date/Time 3/27/2023 3:10 PM     Performed by  Gaston Nuñez DPM     Authorized by Gaston Nuñez DPM      Universal Protocol   Consent: Verbal consent obtained  Risks and benefits: risks, benefits and alternatives were discussed     Procedure Details   Procedure Notes: Bilateral hallux nails debrided with large nail nipper  Tolerated well by patient  No immediate complications observed         Dr Elenora Habermann was present during this entire procedure  History of Present Illness     HPI: Mary Kay Haines is an 81 y/o [place]enegrin speaking female returning to clinic for follow up of left foot drop and pain secondary to this deformity  She states that she did not receive her AFO brace that was prescribed during last visit  She states that the foot drop is painful and that she has difficulty ambulating due to the rigid position of her left foot  She states that her nails are also elongated and thickened bilaterally  No other pedal complaints at this time  Review of Systems   Constitutional: Negative  HENT: Negative  Eyes: Negative  Respiratory: Negative  Cardiovascular: Negative      Gastrointestinal: Negative  Musculoskeletal: left foot drop  Skin: onychomycosis   Neurological: Negative  Historical Information   Past Medical History:   Diagnosis Date   • A-fib ([address] )    • Anemia    • Arthritis    • Breast pain, right    • Chest pain on breathing    • Colon polyp    • Cough    • Diverticulosis    • Encounter for screening colonoscopy    • H/O sick sinus syndrome    • Heart murmur    • High cholesterol    • History of atrial fibrillation     Rate ontrolled  On xarelto 15mg (creatinine 50) Followed by Dr Xavier Locke with Cardiology    • History of weight loss     Report loose fitting clothes  Weight stable (3lbs difference)  Last colo showed small tubular adenoma x2  Breast biopsy last showed fibrocystic changes  TSH wnl  Will check cbc, bmp, spep  • Hx of long term use of blood thinners    • Hypertension    • Irregular heart beat    • RICH (obstructive sleep apnea)    • Pacemaker    • Preop examination    • Shortness of breath    • Viral bronchitis      Past Surgical History:   Procedure Laterality Date   • BREAST BIOPSY Right 08/17/2006    ultrasound guided Percutaneous Needle Core -Benign   • CATARACT EXTRACTION     • CATARACT EXTRACTION W/ INTRAOCULAR LENS  IMPLANT, BILATERAL     • COLONOSCOPY     • COLONOSCOPY N/A 5/22/2018    Procedure: COLONOSCOPY;  Surgeon: Carlita Gonzalez DO;  [facility]; Service: Gastroenterology   • Ryan Hussein / Najma Leonard / Maxine Madden     • LEG SURGERY Right     as a child after a fall   • MAMMO STEREOTACTIC BREAST BIOPSY RIGHT (ALL INC) Right 10/2014    benign   • AR CMBND ANTERPOST COLPORRAPHY W/CYSTO N/A 3/17/2016    Procedure: COLPORRHAPHY ANTERIOR POSTERIOR ;  Surgeon: Saqib Arguello MD;  [facility];  Service: Gynecology   • AR COLONOSCOPY FLX DX W/COLLJ SPEC WHEN PFRMD N/A 4/4/2019    Procedure: COLONOSCOPY;  Surgeon: Carlita Gonzalez DO;  [facility];   Service: Gastroenterology   • AR COLPOPEXY VAGINAL EXTRAPERITONEAL APPROACH N/A 3/17/2016    Procedure: COLPOPEXY VAGINAL EXTRAPERITONEAL (VEC) ANTERIOR ;  Surgeon: Sonny Spatz, MD;  [facility];  Service: Gynecology   • NE CYSTOURETHROSCOPY N/A 3/17/2016    Procedure: CYSTOSCOPY;  Surgeon: Sonny Spatz, MD;  [facility];  Service: Gynecology   • NE ESOPHAGOGASTRODUODENOSCOPY TRANSORAL DIAGNOSTIC N/A 4/16/2018    Procedure: EGD AND COLONOSCOPY;  Surgeon: George Jacques DO;  [facility];   Service: Gastroenterology   • NE LAPAROSCOPY COLECTOMY PARTIAL W/ANASTOMOSIS Right 1/13/2022    Procedure: Diagnostic laparoscopy, laparscopic right colectomy;  Surgeon: Mone Muhammad MD;  [facility];  Service: Colorectal   • NE SLING OPERATION STRESS INCONTINENCE N/A 3/17/2016    Procedure: INSERTION PUBOVAGINAL SLING SINGLE INCISION ;  Surgeon: Sonny Spatz, MD;  [facility];  Service: Gynecology     Social History   Social History     Substance and Sexual Activity   Alcohol Use Never     Social History     Substance and Sexual Activity   Drug Use No     Social History     Tobacco Use   Smoking Status Never   Smokeless Tobacco Never     Family History:   Family History   Problem Relation Age of Onset   • Diabetes Mother    • Breast cancer Sister 48   • No Known Problems Father    • No Known Problems Maternal Grandmother    • No Known Problems Maternal Grandfather    • No Known Problems Paternal Grandmother    • No Known Problems Paternal Grandfather    • No Known Problems Daughter    • No Known Problems Son    • No Known Problems Sister    • No Known Problems Sister    • No Known Problems Brother    • No Known Problems Brother    • No Known Problems Sister    • No Known Problems Paternal Aunt    • No Known Problems Paternal Aunt    • No Known Problems Paternal Aunt    • No Known Problems Paternal Aunt        Meds/Allergies   (Not in a hospital admission)    Allergies   Allergen Reactions   • Other Itching     Bandaids   • Tape [Medical Tape] Itching       Objective     Current Vitals:   Blood Pressure: 159/69 (03/27/23 1411)  Pulse: 69 (03/27/23 1411)  Temperature: (!) 97 3 °F (36 3 °C) (03/27/23 1411)  Temp Source: Temporal (03/27/23 1411)  Weight - Scale: 63 1 kg (139 lb 3 2 oz) (03/27/23 1411)  SpO2: 99 % (03/27/23 1411)        /69 (BP Location: Right arm, Patient Position: Sitting, Cuff Size: Standard)   Pulse 69   Temp (!) 97 3 °F (36 3 °C) (Temporal)   Wt 63 1 kg (139 lb 3 2 oz)   SpO2 99%   BMI 24 66 kg/m²       Lower Extremity Exam:    Vascular: DP pulses weakly palpable bilaterally  PT pulses palpable bilaterally  There is +1 lower extremity edema bilaterally  Capillary refill < 3 seconds bilaterally       Musculoskeletal: There is 5/5 strength throughout the right lower extremity, with 1/5 muscle strength on the left  Full ankle range of motion on the right with  Decreased ankle ROM on the left  well maintained subtalar range of motion                                    There is no tenderness over the to bilateral foot and ankle                                           There is foot deformities: drop foot on the left, with mild cavus  Neurological: Sensation to 5 07 [place] nylon filament: intact bilaterally                           Vibratory sense to distal Foot intact bilaterally                           Sharp/Dull sense is intact bilaterally     Dermatology: Skin Condition:  coolness, decreased hair growth, dryness, no abnormal pigmentation, no evidence of bleeding or bruising and no lesions noted                          There is not evidence of macerated tissue between toe spaces                              Nail Exam: onychomycosis of bilateral hallux toenails  Open ulcerations: No                          Calluses: Yes, hyperkeratotic lesion at tip of left hallux

## 2023-03-30 NOTE — PROGRESS NOTES
09/07/22 1230   Pain Assessment   Pain Assessment Tool FLACC   Pain Rating: FLACC (Rest) - Face 0   Pain Rating: FLACC (Rest) - Legs 1   Pain Rating: FLACC (Rest) - Activity 1   Pain Rating: FLACC (Rest) - Cry 0   Pain Rating: FLACC (Rest) - Consolability 1   Score: FLACC (Rest) 3   Pain Rating: FLACC (Activity) - Face 1   Pain Rating: FLACC (Activity) - Legs 1   Pain Rating: FLACC (Activity) - Activity 1   Pain Rating: FLACC (Activity) - Cry 0   Pain Rating: FLACC (Activity) - Consolability 1   Score: FLACC (Activity) 4   Restrictions/Precautions   Precautions Aspiration;Bed/chair alarms;Cognitive; Fall Risk;Supervision on toilet/commode   Weight Bearing Restrictions No   ROM Restrictions No   Braces or Orthoses AFO  (donned prior to amb, skin assessed prior to, and ofter session )   Cognition   Overall Cognitive Status Impaired   Comments one instance noted of uncontrollable repetition of word "si"   Subjective   Subjective Pt sitting in recliner upon PT entry  Pt says she is feeling good and ready to go to therapy  She reports not eating a lot of her lunch, but was excited that she was able to urinate after lunch     Sit to Lying   Type of Assistance Needed Physical assistance   Physical Assistance Level 26%-50%   Comment required min Ax1 to elevate feet onto bed   Sit to Lying CARE Score 3   Lying to Sitting on Side of Bed   Type of Assistance Needed Physical assistance   Physical Assistance Level 25% or less   Comment used bed rails to get upright   Lying to Sitting on Side of Bed CARE Score 3   Sit to Stand   Type of Assistance Needed Physical assistance   Physical Assistance Level 26%-50%   Comment min Ax1   Sit to Stand CARE Score 3   Bed-Chair Transfer   Type of Assistance Needed Physical assistance   Physical Assistance Level 51%-75%   Comment mod ax1 stand pivot trxr; VC provided for LE positioning   Chair/Bed-to-Chair Transfer CARE Score 2   Walk 10 Feet   Type of Assistance Needed Physical assistance Physical Assistance Level Total assistance   Comment BL HHA w/ WCF   Walk 10 Feet CARE Score 1   Walk 50 Feet with Two Turns   Type of Assistance Needed Physical assistance   Physical Assistance Level Total assistance   Comment BL HHA w/ WCF   Walk 50 Feet with Two Turns CARE Score 1   Walk 150 Feet   Type of Assistance Needed Physical assistance   Physical Assistance Level Total assistance   Comment BL HHA w  WCF   Walk 150 Feet CARE Score 1   Ambulation   Does the patient walk? 2  Yes   Primary Mode of Locomotion Prior to Admission Walk   Distance Walked (feet) 200 ft   Assist Device Hand Hold   Gait Pattern Inconsistant Kristan;Decreased foot clearance;L foot drag;Slow Kristan;Narrow LUCY;Scissoring; Step to; Step through; Decreased L stance; Improper weight shift   Limitations Noted In Balance; Coordination; Heel Strike;Speed;Strength;Swing;Posture; Safety; Sequencing   Provided Assistance with: Balance;Direction   Walk Assist Level Moderate Assist  (Ax2 CF)   Findings performed BL HHA to focus on improving step length and speed  pt showed improved consistency w/ kristan   Wheelchair mobility   Findings not focus of this session   Stairs   Findings not focus of this session   Therapeutic Interventions   Neuromuscular Re-Education 3x5 Side stepping w/ UE support on hand rail  LLE required min ax1 to move laterally  VC provided to pt to "clear the ground" which improved mobility   Other manual therapy provided x10 min for L upper trap area  Equipment Use   NuStep x8 min L1  pt presents w/ increased respiratory rate and complaints of L trap soreness at 5min, switched to just LE for final 3 min   Other Comments   Comments assessed visual tracking and saccades;  presenting w/ R sided visual impairments w/ a lack of visual tracking; recommend continued visual exercises, VOR focused interventions  Assessment   Treatment Assessment pt participated in 90 min of skilled therapy session   Pt did not tolerate therapy well today, and showed an increase in fatigue upon completing NuStep  BP taken showing no abnormalities; 130/78  after completing three rounds of five side steps using the hand rail, pt expressed significant fatigue and stated she needed rest  while rest was being given, pt complained of uncontrollable blinking of her eyes in conjuction w/ sporadic arm movement of L UE  Vitals were taken again at this point w/ BP being 120/56  No nystagmus noted, and no dizziness reported from the patient  Nursing staff made aware of situation and was present for further evaluation  During manual therapy session one instance noted of uncontrollable repetition of word "si"  Pt reported "no I do not know why I am saying that"  Repeating did not occur again the rest of session  PT adjusted pts board in room, downgrading her from stand pivot transfer w/ RW Ax2, to sit pivot transfer Ax2, due to the increased tone and retropulsive tendencies of pt during transfers today  Pt would benefit from continued therapy services to decrease tone, improve functional mobility, and decrease overall care giver burden  Problem List Decreased strength;Decreased range of motion;Decreased endurance; Impaired balance;Decreased mobility; Decreased coordination;Decreased cognition;Decreased safety awareness; Impaired vision;Pain;Orthopedic restrictions; Impaired tone   Barriers to Discharge Inaccessible home environment;Decreased caregiver support   PT Barriers   Functional Limitation Car transfers; Ramp negotiation;Stair negotiation;Standing;Transfers; Walking; Wheelchair management   Plan   Treatment/Interventions Functional transfer training;LE strengthening/ROM; Therapeutic exercise; Endurance training;Patient/family training;Bed mobility;Gait training; Compensatory technique education   Progress Slow progress, decreased activity tolerance   PT Therapy Minutes   PT Time In 1230   PT Time Out 1400   PT Total Time (minutes) 90   PT Mode of treatment - Individual (minutes) 90   PT Mode of treatment - Concurrent (minutes) 0   PT Mode of treatment - Group (minutes) 0   PT Mode of treatment - Co-treat (minutes) 0   PT Mode of Treatment - Total time(minutes) 90 minutes   PT Cumulative Minutes 1585   Therapy Time missed   Time missed?  No   Amount of time missed 0 Consent (Marginal Mandibular)/Introductory Paragraph: The rationale for Mohs was explained to the patient and consent was obtained. The risks, benefits and alternatives to therapy were discussed in detail. Specifically, the risks of damage to the marginal mandibular branch of the facial nerve, infection, scarring, bleeding, prolonged wound healing, incomplete removal, allergy to anesthesia, and recurrence were addressed. Prior to the procedure, the treatment site was clearly identified and confirmed by the patient. All components of Universal Protocol/PAUSE Rule completed.

## 2023-05-02 ENCOUNTER — OFFICE VISIT (OUTPATIENT)
Dept: PHYSICAL THERAPY | Facility: CLINIC | Age: 83
End: 2023-05-02

## 2023-05-02 DIAGNOSIS — Z86.73 HISTORY OF CVA (CEREBROVASCULAR ACCIDENT): Primary | ICD-10-CM

## 2023-05-02 DIAGNOSIS — Z74.09 IMPAIRED FUNCTIONAL MOBILITY, BALANCE, GAIT, AND ENDURANCE: ICD-10-CM

## 2023-05-02 NOTE — PROGRESS NOTES
Daily Note     Today's date: 2023  Patient name: Bennie Jimenez  : 1940  MRN: [id number]  Referring provider: Rosalba Monroy PA-C  Dx:   Encounter Diagnosis     ICD-10-CM    1  History of CVA (cerebrovascular accident)  Z86 [id number]       2  Impaired functional mobility, balance, gait, and endurance  Z74 09                      Subjective: Anand Gordon reports that she gets some pain in her toes, but has not been too bad  States that she was unable to go for her brace due to a family emergency  States she got new shoes that are wider  Objective: See treatment diary below      Assessment: Tolerated treatment well  Patient demonstrated fatigue post treatment, exhibited good technique with therapeutic exercises and would benefit from continued PT  Slight knee extension during initial contact of L LE, however improved foot clearance and step length noted with wearing AFO on L side, was able to ambulate for 10 minutes before seated rest break  Difficulty with force generation during resisted walking, however improved with repetitions, difficulty with taking large step on R LE  LOB but able to self-correct without additional support when attempting hurdles leading with R LE and with foot placement of L LE 1/2 on jim  Some ankle pain (medial) and pain at top of foot, however most likely not due to brace  Skin check post session with slight redness at anterior shin  Patient reports she had a spot at the lateral pinky toe, states not sure if it was from the brace from a previous session or from her shoes being too small, no pain or redness post session  Continue to progress as tolerated  Plan: Continue per plan of care        Precautions: HTN, a-fib, pacemaker      Manuals  3      LLE AFO M                              Neuro Re-Ed        Standing marching        sidesteps  SOLO  4# AW on L LE, 1/4 lap x 1 lap SOLO  No AW LLE, 1/4 lap x2 laps  CGA in hallway 50' each direction    [facility] "SOLO  4# AW on L LE, 1/4 lap x 4 laps   CGA, in hallway x 1 lap    Sit to stand with LE elevated        Step taps    SOLO  4-inch step without UE support x 15 reps each, alternating    Resisted walking SOLO  Fast walking 1/4 lap x 4 laps    4# AW and resistance at clavicles 1/4 lap x 4 laps    4# AW and resistance with blue TB at harness 1/4 lap x 2 laps SOLO  Resistance at clavicles 1/4 lap x 4 laps, no AW SOLO BTB resistance at harnes no AW no AD  VC for increased propulsion     1/4 4x laps   SOLO  Maroon TB at waist no AW 1/4 lap x 2 laps   backwards SOLO  4# AW 1/4 lap x 2 laps   SOLO  4# AW 1/4 lap x 3 laps SOLO no AW no AD 1/4 lap x2 laps  SOLO  No AD, no AW, 1/4 lap x 2 laps    hurdles     SOLO  4-inch hurdles (5) x 2 laps forward, leading with L LE   Step up SOLO  4# AW on L LE, 6-inch step up and over x 4 reps leading with L, x 6 reps leading with R  SOLO no AW  LLE AFO 4-inch step up and over 2x4\" reps leading with L      Tall kneeling        foam        Bolster kick        Bean bag  from floor        Resisted lean  Forward and then to stand x 10 reps then STS without UE support x 5 reps              Ther Ex        Sit to stand Sit to stand seated on foam no UE's x 12 reps  Sit <> stand 4x5 without UE  VC for increased L foot usage  Minor retropulsion  Sit to stand x 10 reps with BUE support on L LE for increased WB, slight retropulsion with CGA/min A    nustep        treadmill        Manual stretching  L LE ankle PROM, toe extension x 5 minutes                                          Ther Activity        Floor transfer        Bed mobility                Gait Training        Gait with SPC        With FWW in hallway   Trial of gait speed improvements 3x laps hallway   1 min 12 sec  54 sec  49 sec     6 MWT see results above With L ALS AFO x 3 laps with FWW,    3 laps with SPC          Gait training on TM  SOLO TM  1  5mph for 5 minutes with BUE support, requested to stop due to fatigue, SpO2 99%, HR " to 91    Seated break before 5 more minutes at 1  5mph    Total 10 minutes   SOLO  BUE support and L ALS AFO (medium) 1 4mph for 10 minutes   Amb without AD   SOLO 3x laps 1/4 lap  Cued for increased L step height      staircase        Education    Education on AFO use  Education on AFO and donning/doffing, skin check                               · Access Code: [email or web address]  URL: [email or web address]  Date: 10/13/2022  Prepared by: Peter Savanna    Exercises    Seated Hamstring Stretch with Strap - 1 x daily - 7 x weekly - 3 sets - 30 hold   Seated Gastroc Stretch with Strap - 1 x daily - 7 x weekly - 3 sets - 30 hold   Seated Long Arc Quad - 1 x daily - 5 x weekly - 3 sets - 10 reps   Seated March - 1 x daily - 5 x weekly - 3 sets - 10 reps   Seated Heel Raise - 1 x daily - 5 x weekly - 3 sets - 10 reps   Seated Hip Adduction Isometrics with Ball - 1 x daily - 5 x weekly - 3 sets - 10 reps - 5 hold   Seated Hip Abduction - 1 x daily - 5 x weekly - 3 sets - 10 reps   Seated Toe Raise - 1 x daily - 7 x weekly - 2 sets - 10 reps   Supine Ankle Pumps - 1 x daily - 5 x weekly - 2 sets - 10 reps   Supine Heel Slide - 1 x daily - 5 x weekly - 2 sets - 10 reps   Supine Bridge - 1 x daily - 3 x weekly - 2 sets - 10 reps   Standing March with Counter Support - 1 x daily - 3 x weekly - 2 sets - 10 reps   Side Stepping with Counter Support - 1 x daily - 3 x weekly - 5 sets

## 2023-05-05 ENCOUNTER — APPOINTMENT (OUTPATIENT)
Dept: PHYSICAL THERAPY | Facility: CLINIC | Age: 83
End: 2023-05-05
Payer: MEDICARE

## 2023-05-08 ENCOUNTER — OFFICE VISIT (OUTPATIENT)
Dept: MULTI SPECIALTY CLINIC | Facility: CLINIC | Age: 83
End: 2023-05-08

## 2023-05-08 VITALS
DIASTOLIC BLOOD PRESSURE: 77 MMHG | TEMPERATURE: 98.2 F | WEIGHT: 142 LBS | SYSTOLIC BLOOD PRESSURE: 152 MMHG | HEART RATE: 80 BPM | BODY MASS INDEX: 25.15 KG/M2

## 2023-05-08 DIAGNOSIS — B35.1 ONYCHOMYCOSIS: ICD-10-CM

## 2023-05-08 DIAGNOSIS — Z86.73 HISTORY OF CVA (CEREBROVASCULAR ACCIDENT): ICD-10-CM

## 2023-05-08 DIAGNOSIS — M21.372 FOOT DROP, LEFT FOOT: Primary | ICD-10-CM

## 2023-05-08 NOTE — PROGRESS NOTES
Podiatry Clinic  Lupe Oreilly 80 y o  female MRN: [id number]  Encounter: [id number]      Assessment/Plan        Diagnoses and all orders for this visit:    Foot drop, left foot    Onychomycosis    History of CVA (cerebrovascular accident)       Plan:  • Patient was seen/examined  All questions and concerns addressed  • Discussed need for AFO brace with patient, she states that she has not received it yet from [address] and has an appointment in two weeks  Explained that bracing for her drop foot on the left is recommended therapy at this time, and to follow up in clinic once she obtains the brace  • Mycotic nails not debrided today, as length and thickness is not bothersome to patient, will debride during next appointment  • RTC once AFO brace obtained     Dr Santy Talbert was present during this entire procedure  History of Present Illness     HPI: Lupe Oreilly is an 81 y/o Greek speaking female returning to clinic for follow up of left foot drop and pain in her toes secondary to this deformity  She states that she did not receive her AFO brace that was prescribed during last visit  She states that the foot drop is painful and that she has difficulty ambulating due to the rigid position of her left foot  She explained that she has an appointment in 2 weeks to receive brace and will follow up at that time  Review of Systems   Constitutional: Negative  HENT: Negative  Eyes: Negative  Respiratory: Negative  Cardiovascular: Negative  Gastrointestinal: Negative  Musculoskeletal: Left foot drop and toe pain  Skin: Negative   Neurological: Negative         Historical Information   Past Medical History:   Diagnosis Date   • [facility]    • Anemia    • Arthritis    • Breast pain, right    • Chest pain on breathing    • Colon polyp    • Cough    • Diverticulosis    • Encounter for screening colonoscopy    • H/O sick sinus syndrome    • Heart murmur    • High cholesterol    • History of atrial fibrillation     Rate ontrolled  On xarelto 15mg (creatinine 50) Followed by Dr Meet Benton with Cardiology    • History of weight loss     Report loose fitting clothes  Weight stable (3lbs difference)  Last colo showed small tubular adenoma x2  Breast biopsy last showed fibrocystic changes  TSH wnl  Will check cbc, bmp, spep  • Hx of long term use of blood thinners    • Hypertension    • Irregular heart beat    • RICH (obstructive sleep apnea)    • Pacemaker    • Preop examination    • Shortness of breath    • Viral bronchitis      Past Surgical History:   Procedure Laterality Date   • BREAST BIOPSY Right 08/17/2006    ultrasound guided Percutaneous Needle Core -Benign   • CATARACT EXTRACTION     • CATARACT EXTRACTION W/ INTRAOCULAR LENS  IMPLANT, BILATERAL     • COLONOSCOPY     • COLONOSCOPY N/A 5/22/2018    Procedure: COLONOSCOPY;  Surgeon: Conchita Diana DO;  [facility]; Service: Gastroenterology   • Maldonado De La Cruz / Criss Prince / Marsha Delcid     • LEG SURGERY Right     as a child after a fall   • MAMMO STEREOTACTIC BREAST BIOPSY RIGHT (ALL INC) Right 10/2014    benign   • WA CMBND ANTERPOST COLPORRAPHY W/CYSTO N/A 3/17/2016    Procedure: COLPORRHAPHY ANTERIOR POSTERIOR ;  Surgeon: Dequan White MD;  [facility];  Service: Gynecology   • WA COLONOSCOPY FLX DX W/COLLJ SPEC WHEN PFRMD N/A 4/4/2019    Procedure: COLONOSCOPY;  Surgeon: Conchita Diana DO;  [facility];   Service: Gastroenterology   • WA COLPOPEXY VAGINAL EXTRAPERITONEAL APPROACH N/A 3/17/2016    Procedure: COLPOPEXY VAGINAL EXTRAPERITONEAL (VEC) ANTERIOR ;  Surgeon: Dequan White MD;  [facility];  Service: Gynecology   • WA CYSTOURETHROSCOPY N/A 3/17/2016    Procedure: CYSTOSCOPY;  Surgeon: Dequan White MD;  [facility];  Service: Gynecology   • WA ESOPHAGOGASTRODUODENOSCOPY TRANSORAL DIAGNOSTIC N/A 4/16/2018    Procedure: EGD AND COLONOSCOPY;  Surgeon: Lala Francisco DO Suhail;  [facility];   Service: Gastroenterology   • SC LAPAROSCOPY COLECTOMY PARTIAL W/ANASTOMOSIS Right 1/13/2022    Procedure: Diagnostic laparoscopy, laparscopic right colectomy;  Surgeon: Demetrius Kidd MD;  Location:  MAIN OR;  Service: Colorectal   • SC SLING OPERATION STRESS INCONTINENCE N/A 3/17/2016    Procedure: INSERTION PUBOVAGINAL SLING SINGLE INCISION ;  Surgeon: Desiree Guidry MD;  [facility];  Service: Gynecology     Social History   Social History     Substance and Sexual Activity   Alcohol Use Never     Social History     Substance and Sexual Activity   Drug Use No     Social History     Tobacco Use   Smoking Status Never   Smokeless Tobacco Never     Family History:   Family History   Problem Relation Age of Onset   • Diabetes Mother    • Breast cancer Sister 48   • No Known Problems Father    • No Known Problems Maternal Grandmother    • No Known Problems Maternal Grandfather    • No Known Problems Paternal Grandmother    • No Known Problems Paternal Grandfather    • No Known Problems Daughter    • No Known Problems Son    • No Known Problems Sister    • No Known Problems Sister    • No Known Problems Brother    • No Known Problems Brother    • No Known Problems Sister    • No Known Problems Paternal Aunt    • No Known Problems Paternal Aunt    • No Known Problems Paternal Aunt    • No Known Problems Paternal Aunt        Meds/Allergies   (Not in a hospital admission)    Allergies   Allergen Reactions   • Other Itching     Bandaids   • Tape [Medical Tape] Itching       Objective     Current Vitals:   Blood Pressure: 152/77 (05/08/23 1310)  Pulse: 80 (05/08/23 1310)  Temperature: 98 2 °F (36 8 °C) (05/08/23 1310)  Temp Source: Temporal (05/08/23 1310)  Weight - Scale: 64 4 kg (142 lb) (05/08/23 1310)        /77 (BP Location: Right arm, Patient Position: Sitting, Cuff Size: Standard)   Pulse 80   Temp 98 2 °F (36 8 °C) (Temporal)   Wt 64 4 kg (142 lb) BMI 25 15 kg/m²       Lower Extremity Exam:    Vascular: DP pulses weakly palpable bilaterally                    PT pulses palpable bilaterally                     There is +1 lower extremity edema bilaterally                    Capillary refill < 3 seconds bilaterally       Musculoskeletal: There is 5/5 strength throughout the right lower extremity, with 1/5 muscle strength on the left          Full ankle range of motion on the right with  Decreased ankle ROM on the left  well maintained subtalar range of motion                                   There is no tenderness over the to bilateral foot and ankle                                           There is foot deformities: drop foot on the left, with mild cavus       Neurological: Sensation to 5 07 [place] nylon filament: intact bilaterally                           Vibratory sense to distal Foot intact bilaterally                           Sharp/Dull sense is intact bilaterally     Dermatology: Skin Condition:  coolness, decreased hair growth, dryness, no abnormal pigmentation, no evidence of bleeding or bruising and no lesions noted                          There is not evidence of macerated tissue between toe spaces                             Nail Exam: onychomycosis of bilateral hallux toenails                          Open ulcerations:  No                          Calluses: No calluses present on exam today

## 2023-05-09 ENCOUNTER — OFFICE VISIT (OUTPATIENT)
Dept: PHYSICAL THERAPY | Facility: CLINIC | Age: 83
End: 2023-05-09

## 2023-05-09 ENCOUNTER — TELEPHONE (OUTPATIENT)
Dept: INTERNAL MEDICINE CLINIC | Facility: CLINIC | Age: 83
End: 2023-05-09

## 2023-05-09 DIAGNOSIS — Z74.09 IMPAIRED FUNCTIONAL MOBILITY, BALANCE, GAIT, AND ENDURANCE: ICD-10-CM

## 2023-05-09 DIAGNOSIS — L85.3 XEROSIS CUTIS: ICD-10-CM

## 2023-05-09 DIAGNOSIS — Z86.73 HISTORY OF CVA (CEREBROVASCULAR ACCIDENT): Primary | ICD-10-CM

## 2023-05-09 NOTE — PROGRESS NOTES
Daily Note     Today's date: 2023  Patient name: Lyric Betancourt  : 1940  MRN: [id number]  Referring provider: Twin Gomes PA-C  Dx:   Encounter Diagnosis     ICD-10-CM    1  History of CVA (cerebrovascular accident)  Z86 73       2  Impaired functional mobility, balance, gait, and endurance  Z74 09           Start Time: 08  Stop Time: 853  Total time in clinic (min): 50 minutes    Subjective: Praguesudhir Aleman reports that she has been having pain at her L toes and at the inside of the ankle  She states she went (maybe to [facility]) to get fitted for a brace, states she will get it in 2 weeks  Objective: See treatment diary below      Assessment: Tolerated treatment well  Patient demonstrated fatigue post treatment, exhibited good technique with therapeutic exercises and would benefit from continued PT  Skin check at start of session with some dryness at 5th toe but no irritation  Lashonda Ash reported that the doctor said not to wear the brace during therapy at this time, session performed without  Continues to require cues for increased foot clearance on L LE during swing phase  Continues to have difficulty with sit to stand, cues and changing the environment to increase WB on L LE  Improved WB with R LE on 4-inch step and with pushing up with L UE on arm rest and R UE on L knee  Difficulty with stepping with R LE during resisted walking, performing step-to pattern with some difficulty with balance during  Significant fatigue post resisted walking  Continue to progress as tolerated  Plan: Continue per plan of care        Precautions: HTN, a-fib, pacemaker      Manuals 2/21 3/2 4/19 4/21 5/2 5      LLE AFO M                                  Neuro Re-Ed         Standing marching         sidesteps  SOLO  4# AW on L LE, 1/4 lap x 1 lap SOLO  No AW LLE, 1/4 lap x2 laps  CGA in hallway 50' each direction     HKM SOLO  4# AW on L LE, 1/4 lap x 4 laps   CGA, in hallway x 1 lap     Sit to stand with LE elevated "        Step taps    SOLO  4-inch step without UE support x 15 reps each, alternating     Resisted walking SOLO  Fast walking 1/4 lap x 4 laps    4# AW and resistance at clavicles 1/4 lap x 4 laps    4# AW and resistance with blue TB at harness 1/4 lap x 2 laps SOLO  Resistance at clavicles 1/4 lap x 4 laps, no AW SOLO BTB resistance at harnes no AW no AD  VC for increased propulsion     1/4 4x laps   SOLO  Maroon TB at waist no AW 1/4 lap x 2 laps SOLO  Maroon TB at waist no AQ 1/4 lap x 2 laps   backwards SOLO  4# AW 1/4 lap x 2 laps   SOLO  4# AW 1/4 lap x 3 laps SOLO no AW no AD 1/4 lap x2 laps  SOLO  No AD, no AW, 1/4 lap x 2 laps     hurdles     SOLO  4-inch hurdles (5) x 2 laps forward, leading with L LE SOLO  4-inch hurdles (5) x 2 laps forward, leading with L LE   Step up SOLO  4# AW on L LE, 6-inch step up and over x 4 reps leading with L, x 6 reps leading with R  SOLO no AW  LLE AFO 4-inch step up and over 2x4\" reps leading with L       Tall kneeling         foam         Bolster kick         Bean bag  from floor         Resisted lean  Forward and then to stand x 10 reps then STS without UE support x 5 reps                Ther Ex         Sit to stand Sit to stand seated on foam no UE's x 12 reps  Sit <> stand 4x5 without UE  VC for increased L foot usage  Minor retropulsion  Sit to stand x 10 reps with BUE support on L LE for increased WB, slight retropulsion with CGA/min A  Sit to stand x 12 reps with BUE support  6 reps hand on L arm rest and R hand on L knee   6 reps of R LE on 4-inch step     nustep         treadmill         Manual stretching  L LE ankle PROM, toe extension x 5 minutes                                               Ther Activity         Floor transfer         Bed mobility                  Gait Training         Gait with SPC         With FWW in hallway   Trial of gait speed improvements 3x laps hallway   1 min 12 sec  54 sec  49 sec     6 MWT see results above With L ALS AFO x 3 " laps with FWW,    3 laps with SPC           Gait training on TM  SOLO TM  1  5mph for 5 minutes with BUE support, requested to stop due to fatigue, SpO2 99%, HR to 91    Seated break before 5 more minutes at 1  5mph    Total 10 minutes   SOLO  BUE support and L ALS AFO (medium) 1 4mph for 10 minutes SOLO  BUE support no brace, started with 4# AW on L LE at 1  2mph (ankle weight fell off), increased to 1  5mph for 10 minutes total   Amb without AD   SOLO 3x laps 1/4 lap  Cued for increased L step height       staircase         Education    Education on AFO use  Education on AFO and donning/doffing, skin check                                   · Access Code: [email or web address]  URL: [email or web address]  Date: 10/13/2022  Prepared by: Lady Hernadez    Exercises   • Seated Hamstring Stretch with Strap - 1 x daily - 7 x weekly - 3 sets - 30 hold  • Seated Gastroc Stretch with Strap - 1 x daily - 7 x weekly - 3 sets - 30 hold  • Seated Long Arc Quad - 1 x daily - 5 x weekly - 3 sets - 10 reps  • Seated March - 1 x daily - 5 x weekly - 3 sets - 10 reps  • Seated Heel Raise - 1 x daily - 5 x weekly - 3 sets - 10 reps  • Seated Hip Adduction Isometrics with Ball - 1 x daily - 5 x weekly - 3 sets - 10 reps - 5 hold  • Seated Hip Abduction - 1 x daily - 5 x weekly - 3 sets - 10 reps  • Seated Toe Raise - 1 x daily - 7 x weekly - 2 sets - 10 reps  • Supine Ankle Pumps - 1 x daily - 5 x weekly - 2 sets - 10 reps  • Supine Heel Slide - 1 x daily - 5 x weekly - 2 sets - 10 reps  • Supine Bridge - 1 x daily - 3 x weekly - 2 sets - 10 reps  • Standing March with Counter Support - 1 x daily - 3 x weekly - 2 sets - 10 reps  • Side Stepping with Counter Support - 1 x daily - 3 x weekly - 5 sets

## 2023-05-09 NOTE — TELEPHONE ENCOUNTER
Good afternoon,    Call from [facility]  Medication ammonium lactate (LAC-HYDRIN) 12 % cream needs new prescription to be written by Medicare participating Dr Dr Anshul Giordano not showing as Medicare participating      Thank you,  Lazaro Dodson

## 2023-05-10 ENCOUNTER — CLINICAL SUPPORT (OUTPATIENT)
Dept: INTERNAL MEDICINE CLINIC | Facility: CLINIC | Age: 83
End: 2023-05-10

## 2023-05-10 ENCOUNTER — TELEPHONE (OUTPATIENT)
Dept: INTERNAL MEDICINE CLINIC | Facility: CLINIC | Age: 83
End: 2023-05-10

## 2023-05-10 ENCOUNTER — OFFICE VISIT (OUTPATIENT)
Dept: INTERNAL MEDICINE CLINIC | Facility: CLINIC | Age: 83
End: 2023-05-10

## 2023-05-10 VITALS
BODY MASS INDEX: 25.33 KG/M2 | WEIGHT: 143 LBS | DIASTOLIC BLOOD PRESSURE: 71 MMHG | SYSTOLIC BLOOD PRESSURE: 148 MMHG | HEART RATE: 86 BPM | OXYGEN SATURATION: 98 % | TEMPERATURE: 97.3 F

## 2023-05-10 DIAGNOSIS — R33.9 URINARY RETENTION: ICD-10-CM

## 2023-05-10 DIAGNOSIS — I10 ESSENTIAL HYPERTENSION: ICD-10-CM

## 2023-05-10 DIAGNOSIS — I27.20 PULMONARY HYPERTENSION (HCC): ICD-10-CM

## 2023-05-10 DIAGNOSIS — G47.00 INSOMNIA: ICD-10-CM

## 2023-05-10 DIAGNOSIS — E53.8 B12 DEFICIENCY: Chronic | ICD-10-CM

## 2023-05-10 DIAGNOSIS — I48.20 CHRONIC ATRIAL FIBRILLATION (HCC): Primary | ICD-10-CM

## 2023-05-10 RX ORDER — LANOLIN ALCOHOL/MO/W.PET/CERES
3 CREAM (GRAM) TOPICAL EVERY EVENING
Qty: 30 TABLET | Refills: 0 | Status: SHIPPED | OUTPATIENT
Start: 2023-05-10

## 2023-05-10 RX ORDER — AMMONIUM LACTATE 12 G/100G
CREAM TOPICAL AS NEEDED
Qty: 385 G | Refills: 1 | Status: SHIPPED | OUTPATIENT
Start: 2023-05-10

## 2023-05-10 RX ORDER — AMMONIUM LACTATE 12 G/100G
CREAM TOPICAL AS NEEDED
Qty: 385 G | Refills: 1 | Status: SHIPPED | OUTPATIENT
Start: 2023-05-10 | End: 2023-05-10

## 2023-05-10 RX ADMIN — CYANOCOBALAMIN 1000 MCG: 1000 INJECTION, SOLUTION INTRAMUSCULAR; SUBCUTANEOUS at 09:29

## 2023-05-10 NOTE — ASSESSMENT & PLAN NOTE
RVSP on Echo 8/2022 was 64  PFT 1/2023 were unremarkable  2/2023-Sleep study showing severe apnea and hypopnea    -Patient has scheduled appointment with sleep medicine in August of 2023-will attempt for this to be moved up as patient has severe apnea and is in need of a CPAP machine

## 2023-05-10 NOTE — TELEPHONE ENCOUNTER
Patient is requesting for PCP to take a look at current med list  Patient stated that while in a nursing home, she was prescribed a list of meds that were removed today by Dr Jeanette Paula  However, she would like PCP to go over med list to ensure her that she is taking what she supposed to take

## 2023-05-10 NOTE — PROGRESS NOTES
[facility]  INTERNAL MEDICINE OFFICE VISIT     PATIENT INFORMATION     Tatiana Polanco   80 y o  female   MRN: [id number]    ASSESSMENT/PLAN     Problem List Items Addressed This Visit        Cardiovascular and Mediastinum    Essential hypertension     BP today 148/71  BP at home per patient by nursing is 130-140 SBP  -Will continue Cozaar         Chronic atrial fibrillation ([facility] [address] ) - Primary     Currently on Pradaxa         Pulmonary hypertension ([facility] [address] )     RVSP on Echo 8/2022 was 64  PFT 1/2023 were unremarkable  2/2023-Sleep study showing severe apnea and hypopnea    -Patient has scheduled appointment with sleep medicine in August of 2023-will attempt for this to be moved up as patient has severe apnea and is in need of a CPAP machine            Genitourinary    Urinary retention     Patient was started on Tamulosin for urinary retention in 9/2022 during a hospital visit  -Will discontinue as patient has no complaints  -Will also d/c baclofen due to anticholinergic effects and patient has not had muscle spasms        Other Visit Diagnoses     Insomnia            Schedule a follow-up appointment in 3 months      HEALTH MAINTENANCE     Immunization History   Administered Date(s) Administered   • COVID-19 MODERNA VACC 0 5 ML IM 02/19/2021, 05/27/2021   • COVID-19 Pfizer Vac BIVALENT Jermaine-sucrose 12 Yr+ IM (BOOSTER ONLY) 09/09/2022   • INFLUENZA 10/11/2005, 10/24/2006, 10/15/2007, 09/28/2015, 10/06/2016, 10/18/2017   • Influenza Quadrivalent Preservative Free 3 years and older IM 10/06/2016   • Influenza Quadrivalent, 6-35 Months IM 10/18/2017   • Influenza, high dose seasonal 0 7 mL 10/12/2018, 09/23/2019, 09/23/2020, 09/22/2021, 10/05/2022   • Influenza, seasonal, injectable 09/24/2013, 09/24/2014, 09/28/2015   • Pneumococcal Conjugate 13-Valent 05/31/2016   • Pneumococcal Polysaccharide PPV23 01/01/2001, 12/18/2019   • Tdap 03/12/2014     CHIEF COMPLAINT     Chief Complaint Patient presents with   • Follow-up      HISTORY OF PRESENT ILLNESS     Patient is an 79 y/o with PMH of Afib on Pradaxa, sick sinus syndrome s/p pacemaker, HTN, HLD, CVA in 8/2022 with residual effects of L drop foot  Patient present for f/u of chronic conditions  Patient has been undergoing workup for pulm HTN that included a sleep study 2/2023 showing severe apnea  Patient states she is tired more often during the day and did have a CPAP machine many years ago but did not tolerate it well  Patient has an appointment with sleep medicine in August      Patient does need a repositionable bed due to her CVA  This is medically necessary due to lack of mobility and increased help from caretakers  REVIEW OF SYSTEMS     Review of Systems   Constitutional: Positive for fatigue  Respiratory: Negative for chest tightness and shortness of breath  Cardiovascular: Positive for leg swelling (Chronic LLE swelling)  Negative for chest pain and palpitations  Gastrointestinal: Negative for abdominal distention, abdominal pain and constipation  Genitourinary: Negative for difficulty urinating  Neurological: Negative for dizziness, weakness and headaches  OBJECTIVE     Vitals:    05/10/23 0858   BP: 148/71   BP Location: Left arm   Patient Position: Sitting   Cuff Size: Standard   Pulse: 86   Temp: (!) 97 3 °F (36 3 °C)   TempSrc: Temporal   SpO2: 98%   Weight: 64 9 kg (143 lb)     Physical Exam  Vitals reviewed  Constitutional:       Appearance: Normal appearance  HENT:      Head: Normocephalic and atraumatic  Mouth/Throat:      Mouth: Mucous membranes are moist       Pharynx: Oropharynx is clear  Cardiovascular:      Rate and Rhythm: Normal rate  Rhythm irregular  Pulmonary:      Effort: Pulmonary effort is normal       Breath sounds: Normal breath sounds  Comments: Hepatojugular reflex present   Abdominal:      General: Abdomen is flat  Bowel sounds are normal  There is no distension  Tenderness: There is no abdominal tenderness  Musculoskeletal:      Left lower leg: Edema present  Comments: Chronic LLE edema    Skin:     General: Skin is warm and dry  Neurological:      Mental Status: She is alert and oriented to person, place, and time        Comments: L drop foot due to CVA   Psychiatric:         Mood and Affect: Mood normal          Behavior: Behavior normal        CURRENT MEDICATIONS     Current Outpatient Medications:   •  acetaminophen (TYLENOL) 325 mg tablet, Take 2 tablets (650 mg total) by mouth every 6 (six) hours as needed for mild pain, Disp: , Rfl: 0  •  ammonium lactate (LAC-HYDRIN) 12 % cream, Apply topically as needed for dry skin, Disp: 385 g, Rfl: 0  •  Blood Pressure Monitoring (Blood Pressure Cuff) MISC, Use every other day For HTN, Disp: 1 each, Rfl: 0  •  cyanocobalamin 1,000 mcg/mL, Inject 1 mL (1,000 mcg total) into a muscle every 30 (thirty) days, Disp: 3 mL, Rfl: 0  •  furosemide (LASIX) 20 mg tablet, Take 2 tablets (40 mg total) by mouth daily, Disp: 90 tablet, Rfl: 3  •  Incontinence Supply Disposable (RA Adult Wipes) MISC, Use 4 (four) times a day, Disp: 64 each, Rfl: 10  •  losartan (COZAAR) 50 mg tablet, TAKE 1 TABLET (50 MG TOTAL) BY MOUTH DAILY, Disp: 90 tablet, Rfl: 3  •  melatonin 3 mg, Take 2 tablets (6 mg total) by mouth every evening, Disp: , Rfl: 0  •  menthol-methyl salicylate (BENGAY) 77-17 % cream, Apply topically 2 (two) times a day, Disp: , Rfl: 0  •  nebivolol (BYSTOLIC) 5 mg tablet, Take 1 tablet (5 mg total) by mouth daily, Disp: 90 tablet, Rfl: 3  •  nitroglycerin (NITROSTAT) 0 4 mg SL tablet, Place 1 tablet (0 4 mg total) under the tongue every 5 (five) minutes as needed for chest pain, Disp: , Rfl: 0  •  pantoprazole (PROTONIX) 40 mg tablet, TAKE 1 TABLET (40 MG TOTAL) BY MOUTH DAILY IN THE EARLY MORNING, Disp: 90 tablet, Rfl: 3  •  polyethylene glycol (MIRALAX) 17 g packet, Take 17 g by mouth daily as needed (First line and refer to bowel protocol), Disp: , Rfl: 0  •  Pradaxa 150 MG capsu, TAKE 1 CAPSULE (150 MG TOTAL) BY MOUTH 2 (TWO) TIMES A DAY, Disp: 180 capsule, Rfl: 3  •  rosuvastatin (CRESTOR) 10 MG tablet, TAKE 0 5 TABLETS (5 MG TOTAL) BY MOUTH DAILY, Disp: 45 tablet, Rfl: 3  •  verapamil (CALAN-SR) 240 mg CR tablet, Take 1 tablet (240 mg total) by mouth daily at bedtime, Disp: 90 tablet, Rfl: 3  •  potassium chloride (K-DUR,KLOR-CON) 10 mEq tablet, Take 2 tablets (20 mEq total) by mouth daily (Patient not taking: Reported on 2/15/2023), Disp: 180 tablet, Rfl: 3    Current Facility-Administered Medications:   •  cyanocobalamin injection 1,000 mcg, 1,000 mcg, Intramuscular, Q30 Days, Pepe Jeronimo MD, 1,000 mcg at 05/10/23 0929    PAST MEDICAL & SURGICAL HISTORY     Past Medical History:   Diagnosis Date   • A-fib [facility])    • Anemia    • Arthritis    • Breast pain, right    • Chest pain on breathing    • Colon polyp    • Cough    • Diverticulosis    • Encounter for screening colonoscopy    • H/O sick sinus syndrome    • Heart murmur    • High cholesterol    • History of atrial fibrillation     Rate ontrolled  On xarelto 15mg (creatinine 50) Followed by Dr Nikki Pallas with Cardiology    • History of weight loss     Report loose fitting clothes  Weight stable (3lbs difference)  Last colo showed small tubular adenoma x2  Breast biopsy last showed fibrocystic changes  TSH wnl  Will check cbc, bmp, spep         • Hx of long term use of blood thinners    • Hypertension    • Irregular heart beat    • RICH (obstructive sleep apnea)    • Pacemaker    • Preop examination    • Shortness of breath    • Viral bronchitis      Past Surgical History:   Procedure Laterality Date   • BREAST BIOPSY Right 08/17/2006    ultrasound guided Percutaneous Needle Core -Benign   • CATARACT EXTRACTION     • CATARACT EXTRACTION W/ INTRAOCULAR LENS  IMPLANT, BILATERAL     • COLONOSCOPY     • COLONOSCOPY N/A 5/22/2018    Procedure: COLONOSCOPY;  Surgeon: Felicita Nelsno DO; [facility]; Service: Gastroenterology   • Maldonado De La Cruz / Criss Prince / Marsha Delcid     • LEG SURGERY Right     as a child after a fall   • MAMMO STEREOTACTIC BREAST BIOPSY RIGHT (ALL INC) Right 10/2014    benign   • MT CMBND ANTERPOST COLPORRAPHY W/CYSTO N/A 3/17/2016    Procedure: COLPORRHAPHY ANTERIOR POSTERIOR ;  Surgeon: Dequan White MD;  [facility];  Service: Gynecology   • MT COLONOSCOPY FLX DX W/COLLJ SPEC WHEN PFRMD N/A 4/4/2019    Procedure: COLONOSCOPY;  Surgeon: Conchita Diana DO;  [facility]; Service: Gastroenterology   • MT COLPOPEXY VAGINAL EXTRAPERITONEAL APPROACH N/A 3/17/2016    Procedure: COLPOPEXY VAGINAL EXTRAPERITONEAL (VEC) ANTERIOR ;  Surgeon: Dequan White MD;  [facility];  Service: Gynecology   • MT CYSTOURETHROSCOPY N/A 3/17/2016    Procedure: CYSTOSCOPY;  Surgeon: Dequan White MD;  [facility];  Service: Gynecology   • MT ESOPHAGOGASTRODUODENOSCOPY TRANSORAL DIAGNOSTIC N/A 4/16/2018    Procedure: EGD AND COLONOSCOPY;  Surgeon: Conchita Diana DO;  [facility]; Service: Gastroenterology   • MT LAPAROSCOPY COLECTOMY PARTIAL W/ANASTOMOSIS Right 1/13/2022    Procedure: Diagnostic laparoscopy, laparscopic right colectomy;  Surgeon: Georges Allison MD;  [facility]: [facility];  Service: Colorectal   • MT SLING OPERATION STRESS INCONTINENCE N/A 3/17/2016    Procedure: INSERTION PUBOVAGINAL SLING SINGLE INCISION ;  Surgeon: Dequan White MD;  [facility];  Service: Gynecology     SOCIAL & FAMILY HISTORY     Social History     Socioeconomic History   • Marital status:       Spouse name: Not on file   • Number of children: Not on file   • Years of education: Not on file   • Highest education level: Not on file   Occupational History   • Occupation: retired   Tobacco Use   • Smoking status: Never   • Smokeless tobacco: Never   Vaping Use   • Vaping Use: Never used   Substance and Sexual Activity   • Alcohol use: Never   • Drug use: No   • Sexual activity: Not Currently     Comment:    Other Topics Concern   • Not on file   Social History Narrative    Housing, household, and economic circumstances      Social Determinants of Health     Financial Resource Strain: Low Risk    • Difficulty of Paying Living Expenses: Not hard at all   Food Insecurity: No Food Insecurity   • Worried About Running Out of Food in the Last Year: Never true   • Ran Out of Food in the Last Year: Never true   Transportation Needs: No Transportation Needs   • Lack of Transportation (Medical): No   • Lack of Transportation (Non-Medical):  No   Physical Activity: Not on file   Stress: Not on file   Social Connections: Not on file   Intimate Partner Violence: Not on file   Housing Stability: Unknown   • Unable to Pay for Housing in the Last Year: No   • Number of Places Lived in the Last Year: Not on file   • Unstable Housing in the Last Year: No     Social History     Substance and Sexual Activity   Alcohol Use Never     Social History     Substance and Sexual Activity   Drug Use No     Social History     Tobacco Use   Smoking Status Never   Smokeless Tobacco Never     Family History   Problem Relation Age of Onset   • Diabetes Mother    • Breast cancer Sister 48   • No Known Problems Father    • No Known Problems Maternal Grandmother    • No Known Problems Maternal Grandfather    • No Known Problems Paternal Grandmother    • No Known Problems Paternal Grandfather    • No Known Problems Daughter    • No Known Problems Son    • No Known Problems Sister    • No Known Problems Sister    • No Known Problems Brother    • No Known Problems Brother    • No Known Problems Sister    • No Known Problems Paternal Aunt    • No Known Problems Paternal Aunt    • No Known Problems Paternal Aunt    • No Known Problems Paternal Aunt      ==  Delmy Stephen MD  PGY-1  [facility] [address] [facility] [address]  [facility] , [address] [address] [facility] [address] [facility]  Office: [phone]  Fax: [phone]

## 2023-05-10 NOTE — PROGRESS NOTES
Patient came in today 5/10/23 for a nurse visit for B-12 injection  Administered 1 mL in patient right deltoid  Patient tolerated well   Patient notified to return in 30 days for next injection      NDC: [id number]

## 2023-05-10 NOTE — ASSESSMENT & PLAN NOTE
Patient was started on Tamulosin for urinary retention in 9/2022 during a hospital visit     -Will discontinue as patient has no complaints  -Will also d/c baclofen due to anticholinergic effects and patient has not had muscle spasms

## 2023-05-10 NOTE — TELEPHONE ENCOUNTER
Patient called in because asya said she had an appointment today with Dr Cris Lau but she forgot to ask for a script for a bed that repositions itself

## 2023-05-10 NOTE — TELEPHONE ENCOUNTER
Could you order a bed through [employer] for patient  Not sure if this was talked about at 5/10  If so needs to be documented in office note for insurance purposes

## 2023-05-11 ENCOUNTER — REMOTE DEVICE CLINIC VISIT (OUTPATIENT)
Dept: CARDIOLOGY CLINIC | Facility: CLINIC | Age: 83
End: 2023-05-11

## 2023-05-11 DIAGNOSIS — Z95.0 CARDIAC PACEMAKER IN SITU: Primary | ICD-10-CM

## 2023-05-11 DIAGNOSIS — R33.9 URINARY RETENTION: Primary | ICD-10-CM

## 2023-05-11 NOTE — PROGRESS NOTES
Results for orders placed or performed in visit on 05/11/23   Cardiac EP device report    Narrative    MDT-SINGLE CHAMBER PPM / NOT MRI CONDITIONAL  CARELINK TRANSMISSION: BATTERY VOLTAGE ADEQUATE (9 YRS)   69 3% (>40%/VVIR 60); ALL AVAILABLE LEAD PARAMETERS WITHIN NORMAL LIMITS  NO SIGNIFICANT HIGH RATE EPISODES  NORMAL DEVICE FUNCTION    ES

## 2023-05-12 ENCOUNTER — OFFICE VISIT (OUTPATIENT)
Dept: PHYSICAL THERAPY | Facility: CLINIC | Age: 83
End: 2023-05-12

## 2023-05-12 DIAGNOSIS — Z86.73 HISTORY OF CVA (CEREBROVASCULAR ACCIDENT): Primary | ICD-10-CM

## 2023-05-12 DIAGNOSIS — Z74.09 IMPAIRED FUNCTIONAL MOBILITY, BALANCE, GAIT, AND ENDURANCE: ICD-10-CM

## 2023-05-12 NOTE — PROGRESS NOTES
"Daily Note     Today's date: 2023  Patient name: Lupe Oreilly  : 1940  MRN: [id number]  Referring provider: Darwin Chandra PA-C  Dx:   Encounter Diagnosis     ICD-10-CM    1  History of CVA (cerebrovascular accident)  Z86 73       2  Impaired functional mobility, balance, gait, and endurance  Z74 09                      Subjective: Venita Sloan reports that her foot is hurting a lot today and hasn't been feeling great  Objective: See treatment diary below      Assessment: Tolerated treatment fair  Patient demonstrated fatigue post treatment, exhibited good technique with therapeutic exercises and would benefit from continued PT  SOLO harness unavailable during session  Some difficulty with attempting fast walking in hallway due to foot pain, and refused hurdles due to causing some pain in the L LE  Improved foot clearance with 6-inch step taps, some difficulty with taking foot down from step  Improving activation with backwards walking with cues for increased step length on L LE  Reported decreased pain at end of session, continued education on importance of weight bearing with tone management  Continue to progress as tolerated  Plan: Continue per plan of care  Precautions: HTN, a-fib, pacemaker      Manuals 3/2 4/19 4/21 5/2 5/9 5/12     LLE AFO M                                   Neuro Re-Ed         Standing marching         sidesteps SOLO  4# AW on L LE, 1/4 lap x 1 lap SOLO  No AW LLE, 1/4 lap x2 laps  CGA in hallway 50' each direction      HKM   CGA, in hallway x 1 lap      Sit to stand with LE elevated      4\" step x 10 reps with BUE's and cues for upright posture   Step taps   SOLO  4-inch step without UE support x 15 reps each, alternating   6-inch block x 12 reps each, single UE support   Resisted walking SOLO  Resistance at clavicles 1/4 lap x 4 laps, no AW SOLO BTB resistance at harnes no AW no AD   VC for increased propulsion     1/4 4x laps   SOLO  Maroon TB at waist no AW " "1/4 lap x 2 laps SOLO  Maroon TB at waist no AQ 1/4 lap x 2 laps    backwards SOLO  4# AW 1/4 lap x 3 laps SOLO no AW no AD 1/4 lap x2 laps  SOLO  No AD, no AW, 1/4 lap x 2 laps   // bars (short) x 4 laps UE support on therapist and cues for upright posture   hurdles    SOLO  4-inch hurdles (5) x 2 laps forward, leading with L LE SOLO  4-inch hurdles (5) x 2 laps forward, leading with L LE Refused due to pain   Step up  SOLO no AW  LLE AFO 4-inch step up and over 2x4\" reps leading with L        Tall kneeling         foam      Stepping on/off 2 foam pads x 3 laps forward, 3 laps laterally, single UE support in /// bars   Bolster kick         Bean bag  from floor      X 10 reps, then x 10 reps with toss to end of bars, no UE support   Resisted lean Forward and then to stand x 10 reps then STS without UE support x 5 reps        amb avoiding obstacles      // bars (short) with bean bags x 3 laps, no UE support   Ther Ex         Sit to stand  Sit <> stand 4x5 without UE  VC for increased L foot usage  Minor retropulsion  Sit to stand x 10 reps with BUE support on L LE for increased WB, slight retropulsion with CGA/min A  Sit to stand x 12 reps with BUE support  6 reps hand on L arm rest and R hand on L knee  6 reps of R LE on 4-inch step      nustep         treadmill         Manual stretching L LE ankle PROM, toe extension x 5 minutes                                                Ther Activity         Floor transfer         Bed mobility                  Gait Training         Gait with SPC         With FWW in hallway  Trial of gait speed improvements 3x laps hallway   1 min 12 sec  54 sec  49 sec     6 MWT see results above With L ALS AFO x 3 laps with FWW,    3 laps with SPC         In hallway with FWW, fast walking x 3 laps   Gait training on TM SOLO TM  1  5mph for 5 minutes with BUE support, requested to stop due to fatigue, SpO2 99%, HR to 91    Seated break before 5 more minutes at 1  5mph    Total 10 minutes   " SOLO  BUE support and L ALS AFO (medium) 1 4mph for 10 minutes SOLO  BUE support no brace, started with 4# AW on L LE at 1  2mph (ankle weight fell off), increased to 1  5mph for 10 minutes total    Amb without AD  SOLO 3x laps 1/4 lap  Cued for increased L step height        staircase         Education   Education on AFO use  Education on AFO and donning/doffing, skin check                                    · Access Code: [email or web address]  URL: [email or web address]  Date: 10/13/2022  Prepared by: Pennie Meigs    Exercises   • Seated Hamstring Stretch with Strap - 1 x daily - 7 x weekly - 3 sets - 30 hold  • Seated Gastroc Stretch with Strap - 1 x daily - 7 x weekly - 3 sets - 30 hold  • Seated Long Arc Quad - 1 x daily - 5 x weekly - 3 sets - 10 reps  • Seated March - 1 x daily - 5 x weekly - 3 sets - 10 reps  • Seated Heel Raise - 1 x daily - 5 x weekly - 3 sets - 10 reps  • Seated Hip Adduction Isometrics with Ball - 1 x daily - 5 x weekly - 3 sets - 10 reps - 5 hold  • Seated Hip Abduction - 1 x daily - 5 x weekly - 3 sets - 10 reps  • Seated Toe Raise - 1 x daily - 7 x weekly - 2 sets - 10 reps  • Supine Ankle Pumps - 1 x daily - 5 x weekly - 2 sets - 10 reps  • Supine Heel Slide - 1 x daily - 5 x weekly - 2 sets - 10 reps  • Supine Bridge - 1 x daily - 3 x weekly - 2 sets - 10 reps  • Standing March with Counter Support - 1 x daily - 3 x weekly - 2 sets - 10 reps  • Side Stepping with Counter Support - 1 x daily - 3 x weekly - 5 sets

## 2023-05-15 ENCOUNTER — OFFICE VISIT (OUTPATIENT)
Dept: PHYSICAL THERAPY | Facility: CLINIC | Age: 83
End: 2023-05-15

## 2023-05-15 DIAGNOSIS — Z74.09 IMPAIRED FUNCTIONAL MOBILITY, BALANCE, GAIT, AND ENDURANCE: ICD-10-CM

## 2023-05-15 DIAGNOSIS — Z86.73 HISTORY OF CVA (CEREBROVASCULAR ACCIDENT): Primary | ICD-10-CM

## 2023-05-15 NOTE — PROGRESS NOTES
"Daily Note     Today's date: 5/15/2023  Patient name: Gregg Bell  : 1940  MRN: [id number]  Referring provider: Li Garnica PA-C  Dx:   Encounter Diagnosis     ICD-10-CM    1  History of CVA (cerebrovascular accident)  Z86 73       2  Impaired functional mobility, balance, gait, and endurance  Z74 09           Start Time: 0850  Stop Time: 935  Total time in clinic (min): 45 minutes    Subjective: Ibeth Velasco reports that her foot is feeling better today, and felt ok after treatment the other day  Objective: See treatment diary below      Assessment: Tolerated treatment fair  Patient demonstrated fatigue post treatment, exhibited good technique with therapeutic exercises and would benefit from continued PT  Resumed use of SOLO harness, however SOLO TM was unavailable  Continues to have some difficulty with L foot clearance with step up on 6-inch step and with clearing airex pad with leading with either R or L foot  Improved clearance with repetitions during each exercise  Reports of imbalance with backwards walking, with cues to increase step length both with backwards walking (on L LE), and increased step length on R LE during resisted walking  Continue to progress as tolerated  Plan: Continue per plan of care  Precautions: HTN, a-fib, pacemaker      Manuals  5//9 5/12 5/15    LLE AFO M                                    Neuro Re-Ed         Standing marching         sidesteps SOLO  No AW LLE, 1/4 lap x2 laps  CGA in hallway 50' each direction       HKM  CGA, in hallway x 1 lap    SOLO  1/4 lap x 3 laps   Sit to stand with LE elevated     4\" step x 10 reps with BUE's and cues for upright posture    Step taps  SOLO  4-inch step without UE support x 15 reps each, alternating   6-inch block x 12 reps each, single UE support    Resisted walking SOLO BTB resistance at harnes no AW no AD   VC for increased propulsion     1/4 4x laps   SOLO  Maroon TB at waist no AW 1/4 lap x 2 laps " "SOLO  Maroon TB at waist no AQ 1/4 lap x 2 laps  SOLO  Blue TB around waist 1/4 lap x 2 laps   backwards SOLO no AW no AD 1/4 lap x2 laps  SOLO  No AD, no AW, 1/4 lap x 2 laps   // bars (short) x 4 laps UE support on therapist and cues for upright posture SOLO  1/4 lap x 4 laps   hurdles   SOLO  4-inch hurdles (5) x 2 laps forward, leading with L LE SOLO  4-inch hurdles (5) x 2 laps forward, leading with L LE Refused due to pain    Step up SOLO no AW  LLE AFO 4-inch step up and over 2x4\" reps leading with L      SOLO  6-inch step  X 10 reps up and over leading up with R LE, down with R LE    Step up and back, leading with L LE up, R LE down x 10 reps   Tall kneeling         foam     Stepping on/off 2 foam pads x 3 laps forward, 3 laps laterally, single UE support in /// bars SOLO  Stepping on/off foam pads (4) forward x 3 laps   Bolster kick         Bean bag  from floor     X 10 reps, then x 10 reps with toss to end of bars, no UE support    Resisted lean         amb avoiding obstacles     // bars (short) with bean bags x 3 laps, no UE support    Ther Ex         Sit to stand Sit <> stand 4x5 without UE  VC for increased L foot usage  Minor retropulsion  Sit to stand x 10 reps with BUE support on L LE for increased WB, slight retropulsion with CGA/min A  Sit to stand x 12 reps with BUE support  6 reps hand on L arm rest and R hand on L knee   6 reps of R LE on 4-inch step    Sit to stand x 10 reps with BUE support on L LE   nustep         treadmill         Manual stretching                                                 Ther Activity         Floor transfer         Bed mobility                  Gait Training         Gait with SPC         With FWW in hallway Trial of gait speed improvements 3x laps hallway   1 min 12 sec  54 sec  49 sec     6 MWT see results above With L ALS AFO x 3 laps with FWW,    3 laps with SPC         In hallway with FWW, fast walking x 3 laps    Gait training on TM   SOLO  BUE support and " L ALS AFO (medium) 1 4mph for 10 minutes SOLO  BUE support no brace, started with 4# AW on L LE at 1  2mph (ankle weight fell off), increased to 1  5mph for 10 minutes total     Amb without AD SOLO 3x laps 1/4 lap  Cued for increased L step height         staircase         Education  Education on AFO use  Education on AFO and donning/doffing, skin check                                     · Access Code: [id number]  URL: [email or web address]  Date: 10/13/2022  Prepared by: Doris Cabrera    Exercises   • Seated Hamstring Stretch with Strap - 1 x daily - 7 x weekly - 3 sets - 30 hold  • Seated Gastroc Stretch with Strap - 1 x daily - 7 x weekly - 3 sets - 30 hold  • Seated Long Arc Quad - 1 x daily - 5 x weekly - 3 sets - 10 reps  • Seated March - 1 x daily - 5 x weekly - 3 sets - 10 reps  • Seated Heel Raise - 1 x daily - 5 x weekly - 3 sets - 10 reps  • Seated Hip Adduction Isometrics with Ball - 1 x daily - 5 x weekly - 3 sets - 10 reps - 5 hold  • Seated Hip Abduction - 1 x daily - 5 x weekly - 3 sets - 10 reps  • Seated Toe Raise - 1 x daily - 7 x weekly - 2 sets - 10 reps  • Supine Ankle Pumps - 1 x daily - 5 x weekly - 2 sets - 10 reps  • Supine Heel Slide - 1 x daily - 5 x weekly - 2 sets - 10 reps  • Supine Bridge - 1 x daily - 3 x weekly - 2 sets - 10 reps  • Standing March with Counter Support - 1 x daily - 3 x weekly - 2 sets - 10 reps  • Side Stepping with Counter Support - 1 x daily - 3 x weekly - 5 sets

## 2023-05-18 ENCOUNTER — OFFICE VISIT (OUTPATIENT)
Dept: PHYSICAL THERAPY | Facility: CLINIC | Age: 83
End: 2023-05-18

## 2023-05-18 DIAGNOSIS — Z86.73 HISTORY OF CVA (CEREBROVASCULAR ACCIDENT): ICD-10-CM

## 2023-05-18 DIAGNOSIS — Z74.09 IMPAIRED FUNCTIONAL MOBILITY, BALANCE, GAIT, AND ENDURANCE: Primary | ICD-10-CM

## 2023-05-18 NOTE — PROGRESS NOTES
Progress Note        Today's date: 2023  Patient name: Jose Blair  : 1940  MRN: [id number]  Referring provider: Wyn Saint, PA-C  Dx:   Encounter Diagnosis     ICD-10-CM    1  Impaired functional mobility, balance, gait, and endurance  Z74 09       2  History of CVA (cerebrovascular accident)  Z86 73           Start Time: 810  Stop Time: 902  Total time in clinic (min): 52 minutes     Assessment  23: Aneta Rebolledo has attended 5 sessions of physical therapy after resuming therapy post 30 day hold  Aneta Rebolledo is making progress with therapy, with 6MWT improving by 130 feet with FWW since last re-evaluation, improving from 500 feet to 628 feet  5xSTS time improved from best time of 20 4 seconds to 18 7 seconds, she continues to require bilateral UE support to perform  TUG demonstrated slight improvement from 21 4 seconds to 20 6 seconds  Self-selected gait speed improved from 0 39m/s (with SPC) to 0 46m/s, with more significant improvement in fast speed during 6MWT from 0 52m/s with FWW to 0 71m/s  3-meter backwards walking test with SPC improved from 25 8 seconds to 19 9 seconds with significant decrease in hesitancy with backwards walking  FGA did not demonstrate significant change  Aneta Rebolledo is demonstrating improvement in endurance, LE strength and endurance, improved gait speed and gait speed variability since resuming therapy  She is in the process of waiting to receive AFO for L LE, and would benefit from continued physical therapy for gait training, orthotic management and education, education on skin checks and wearing schedule for AFO  She would continue to benefit from skilled physical therapy to improve balance confidence, improve LE strength and endurance, improve cardiovascular endurance and walking tolerance, decrease fall risk, and maximize function  Continue 2x/week for 4 additional weeks      23: Aneta Rebolledo returns to therapy after being placed on 30 day hold after taking a break from formal therapy to assess progress with HEP  Despite reporting adherence to HEP, since last progress note, Kamille Simmons has demonstrated regression in most outcome measures without having formal therapy  She has decreased most significantly with self-selected gait speed and fast walking speed  With FWW, gait speed has decreased from 0 77m/s to 0 52m/s, and with ambulation with SPC, self-selected gait speed has decreased from 0 64m/s to 0 39m/s (MCID value of 0 1m/s)  6 minute walk test for measuring cardiovascular endurance has declined from 700 feet with FWW to 500 feet with FWW (MCID value of 164 feet)  Kamille Simmons is demonstrating increased time to complete 5xSTS, previously able to complete in 18 seconds, currently between 20 4-27 2 seconds with UE use (MCID value of 3 seconds)  FGA declined from 15/30 to 11/30 (required use of cane during entire test)  TUG time was previously 16 1 seconds with use of SPC, now is 21 4 seconds with use of FWW  Kamille Simmons also reports increased pain and tone in her L toes and ankle, limiting standing and walking function as well as limiting her ability to functionally move the L LE for activities such as dressing and bed mobility  Kamille Simmons continues to be at increased risk of falls from decreased gait speed (below 1 0m/s), increased time to complete TUG (cut-off score of 13 5 seconds), increased time to complete 5xSTS (cut-off score of 15 seconds), and FGA score below cut-off of 22/30  Her gait speed has declined to below cut-off of 0 6m/s, making her more likely to be hospitalized, more likely to require assistance with ADL's and IADL's, and is considered a limited community ambulator  Her decreased endurance during 6MWT of 500 feet with FWW also limits her ability to perform community ambulation tasks  Kamille Simmons would benefit from resuming formal restorative therapy due to decline in function since being placed on 30 day hold   She has also had recent referral placed for AFO that may assist with tone management, decreased pain, and DF assist, and would benefit from additional therapy for trial of AFO, as well as education on wearing schedule  She would benefit from therapy to decrease risk of falls, improve balance, improve LE strength and endurance, tone management, improve independence and decrease caregiver burden, improve functional mobility, maximize function, and improve QOL  Assessment details: (Initial Evaluation)  Patient presents to outpatient physical therapy s/p CVA on 8/12/22, post inpatient rehabilitation and SNF stay  She presents with impaired L LE strength,  Increased L LE tone and spasticity, decreased endurance, decreased balance, increased fall risk, and requiring assistance for mobility tasks (gait, transfers, bed mobility)  She presents with increased fall risk from increased time to complete 5xSTS of 27 6 seconds, above cut-off score of 15 seconds  She also presents with decreased gait speed of 0 30 m/s, below cut-off score of 1 0m/s for increased risk of falls, and also is below cut-off score for needing assist with ADL's and IADLS (0 6G/X cut-off score), is more likely to be hospitalized (0 6m/s cut-off), and is considered a household walker  She is also considered a fall risk with TUG score of 54 4 seconds, above cut-off score of 13 5 seconds for fall risk  She demonstrates decreased LE strength and endurance from 5xSTS of 27 65 seconds (with min A due to retropulsion)  She also demonstrates decreased functional mobility and endurance with 2MWT of 110 feet, and able to ambulate 130 feet in total before requiring rest break  She would benefit from skilled physical therapy to decrease fall risk, improve balance, improve LE strength and endurance, improve cardiovascular endurance, improve functional mobility, decreased caregiver burden and maximize function    Impairments: abnormal gait, abnormal muscle tone, abnormal or restricted ROM, activity intolerance, impaired balance, impaired physical strength, pain with function and safety issue  Understanding of Dx/Px/POC: good   Prognosis: good        Plan  2x/week for 8 weeks  POC starting date: 2023  POC ending date: 6/15/2023    NEW GOALS (23)  ST  Pt will improve self-selected gait speed to at least 0 62m/s with least restrictive AD within 4 weeks to improve gait and decrease fall risk MET WITH FWW during 6MWT  2  Pt will improve TUG time to at least 18 seconds with LRAD within 4 weeks to decrease risk of falls and improve mobility NOT MET  3  Pt will improve 6MWT to at least 600 feet in 4 weeks with FWW to improve endurance and functional mobility MET    LT  Pt will improve fast gait speed to at least 0 75m/s with LRAD within 8 weeks to improve gait and decrease fall risk PROGRESSING  2  Pt will improve TUG time to at least 15 seconds with LRAD within 8 weeks to decrease risk of falls and improve mobility  3  Pt will improve 6MWT to at least 700 feet in 8 weeks with FWW to improve endurance and functional mobility  4  Pt will improve 5xSTS to at least 17 seconds (with UE's) to improve LE strength and endurance PROGRESSING  5  Pt will improve FGA score to at least 16/30 in 8 weeks to improve balance and decrease risk of falls  6  Pt will be independent with HEP in 8 weeks        Subjective Evaluation    History of Present Illness  23: States that the pain in her toes is not as bad since resuming therapy, but she continues to have pain in the toes  She is waiting for a brace (AFO), should be getting in within a week  She reports she doesn't like to use a cane and needs to use the walker  She reports that she gets very tired when walking and needs to take breaks when cleaning in her house  She states she is moving more since restarting therapy, continues to have difficulty with getting up from sitting down  23: Shoulder is feeling better   Still having a lot of pain at her toes, doesn't have an appointment with neurology until August  She states that she is doing her exercises at home  Has a caregiver from -, then has to go to her son's house to sleep and for the morning  She states that she uses the FWW for ambulation due to not liking the cane and not feeling balanced when using it  She states that things are getting more difficult since stopping therapy, she needs help getting up from low surfaces at times  She reports she tries to do things on her own first because she knows she needs to, but sometimes needs help  Per medical chart, referral was made for ankle brace (AFO) due to foot drop      Mechanism of injury: She usually lives alone, now her son is off from work to help her  She states she needs help to move from the bed  She states that she is walking with a walker at home  She states she sometimes uses the wheelchair in the house, but mainly when leaving the house  She has been staying at her home, but went to her son's house yesterday (bathroom 2nd floor)  She states she has difficulty with going down the stairs  She states she has pain in the knee down and the foot feels heavy and asleep on the L side  She states she wants to walk better with the walker  22 with contracted and weak LUE, LLE weakness, L facial droop, mild dysarthria and left sided sensory deficits  CTA showed near occlusive thormbus at R MCA bifurcation, as well as mild beading of the mid to distal ICAs suspicious for mild fibromuscular dysplasia, and mild atherosclerotic disease at bilateral distal carotid arteries  [facility] discharge on 22, discharged to SNF [facility]), discharged last week         Pain  Current pain ratin  At best pain ratin  At worst pain ratin  Location: L LE  Quality: cramping  Relieving factors: rest    Social Support  Steps to enter house: no  Stairs in house: no   Lives in: apartment  Lives with: alone    Treatments  Discharged from (in last 30 days): skilled nursing facility  Patient Goals  Patient goal: walk with the walker and get better        Objective       Manual Muscle Testing - Hip Left Right   Flexion 2+ 4+   Extension NT NT   Abduction NT NT   23: L LE Flexion 3/5, Abduction 2+/5,   23: L LE hip flexion 3+/4, abduction 2+/5, adduction 3/5    Manual Muscle Testing - Knee Left Right   Flexion 3+ 4+   Extension 3+ 4+   23: L LE flexion 4-, extension 4-  23: L LE flexion 4-/5, extension 4-/5    Manual Muscle Testing - Ankle Left Right   Doriflexion 2- 4+   Plantarflexion NT NT   23: L DF 1/5      Coordination Left Right   Alt toe tapping unable Not tested       Sensation Left Right   Kinesthesia NT NT   Light Touch intact intact       Muscle Tone (Modified Sada Scale**) Left Right   Hamstring 2 0   Gastroc 3 0   Quad 2 0   **  0 = no increase in muscle tone  1 = slight increase in muscle tone, minimal resistance at the end of ROM when moved  1+ = minimal resistance throughout the remainder (less than half) of ROM when moved  2 =  David increase in muscle tone through most of the ROM, but still moved easily  3 = considerable increase in muscle tone, movement difficult  4 = affects parts rigid       Balance Test 3/6 4/11 5/18   5x Sit to Stand: 18 0 seconds with UE's and SPV 27 22 seconds with UE's 1st trial    20 40 seconds 2nd trial with UE's 21 06 seconds with UE's     18 75 2nd trial with BUE's   TU 16 sec with SPC and SPV 21 44 seconds with FWW 20 65 seconds with FWW   Gait Speed:  0 64m/s with SPC SSGS    SSGS  0 69m/s no AD    0 77m/s during 6MWT fast gait speed with FWW 0 39m/s with SPC SSGS          0 52m/s with FWW during 6MWT (fast) SSGS 0 46m/s with SPC SSGS    0 47m/s SSGS with FWW    0 71m/s with FWW during 6MWT fast speed   6 Minute Walk Test: 660 feet without AD (gait belt) in 6 minutes    700 feet with FWW (at end of session)     500 feet with  feet with FWW    FGA:  15/30, able to perform without AD  With "[facility] 9/30 with SPC   3MBWT No AD, 15 8 seconds With SPC, 25 8 seconds With SPC, 2nd trial 19 9 seconds         Gait Assessment: Decreased gait speed, decreased step length L>R, decreased heel strike bilaterally  Slightly flexed posture of L LE during swing and initial contact, 1 instance of catching R toes during swing phase with SPC       Precautions: HTN, a-fib, pacemaker      Manuals 4/21 5/2 5/9 5/12 5/15 5/18                                       Neuro Re-Ed      FGA 9/30  TUG x 2 reps   Standing marching         sidesteps CGA in hallway 50' each direction        HKM CGA, in hallway x 1 lap    SOLO  1/4 lap x 3 laps    Sit to stand with LE elevated    4\" step x 10 reps with BUE's and cues for upright posture     Step taps SOLO  4-inch step without UE support x 15 reps each, alternating   6-inch block x 12 reps each, single UE support     Resisted walking  SOLO  Maroon TB at waist no AW 1/4 lap x 2 laps SOLO  Maroon TB at waist no AQ 1/4 lap x 2 laps  SOLO  Blue TB around waist 1/4 lap x 2 laps    backwards SOLO  No AD, no AW, 1/4 lap x 2 laps   // bars (short) x 4 laps UE support on therapist and cues for upright posture SOLO  1/4 lap x 4 laps In hallway 30' x 2 reps   hurdles  SOLO  4-inch hurdles (5) x 2 laps forward, leading with L LE SOLO  4-inch hurdles (5) x 2 laps forward, leading with L LE Refused due to pain     Step up     SOLO  6-inch step  X 10 reps up and over leading up with R LE, down with R LE    Step up and back, leading with L LE up, R LE down x 10 reps Staircase x 3 laps with BUE support   Tall kneeling         foam    Stepping on/off 2 foam pads x 3 laps forward, 3 laps laterally, single UE support in /// bars SOLO  Stepping on/off foam pads (4) forward x 3 laps    Bolster kick         Bean bag  from floor    X 10 reps, then x 10 reps with toss to end of bars, no UE support     Resisted lean         amb avoiding obstacles    // bars (short) with bean bags x 3 laps, no UE support   " Ther Ex      6MWT with  feet    5xSTS with BUE's x 2 reps   Sit to stand Sit to stand x 10 reps with BUE support on L LE for increased WB, slight retropulsion with CGA/min A  Sit to stand x 12 reps with BUE support  6 reps hand on L arm rest and R hand on L knee  6 reps of R LE on 4-inch step    Sit to stand x 10 reps with BUE support on L LE    nustep         treadmill         Manual stretching                                                 Ther Activity         Floor transfer         Bed mobility                  Gait Training         Gait with SPC      In hallway x 5 laps   With FWW in hallway With L ALS AFO x 3 laps with FWW,    3 laps with SPC         In hallway with FWW, fast walking x 3 laps     Gait training on TM  SOLO  BUE support and L ALS AFO (medium) 1 4mph for 10 minutes SOLO  BUE support no brace, started with 4# AW on L LE at 1  2mph (ankle weight fell off), increased to 1  5mph for 10 minutes total      Amb without AD         staircase         Education Education on AFO use  Education on AFO and donning/doffing, skin check    Tone management techniques, role of weight bearing with ambulation and tone, progress with therapy, POC, purpose of AFO                                        Access Code: [email or web address]  URL: [email or web address]  Date: 03/06/2023  Prepared by: Margaret Bee    Exercises  • Seated Hamstring Stretch with Strap - 1 x daily - 7 x weekly - 3 sets - 30 hold  • Seated Gastroc Stretch with Strap - 1 x daily - 7 x weekly - 3 sets - 30 hold  • Seated Long Arc Quad - 1 x daily - 5 x weekly - 3 sets - 10 reps  • Seated March - 1 x daily - 5 x weekly - 3 sets - 10 reps  • Seated Heel Raise - 1 x daily - 5 x weekly - 3 sets - 10 reps  • Seated Hip Adduction Isometrics with Ball - 1 x daily - 5 x weekly - 3 sets - 10 reps - 5 hold  • Seated Hip Abduction - 1 x daily - 5 x weekly - 3 sets - 10 reps  • Seated Toe Raise - 1 x daily - 7 x weekly - 2 sets - 10 reps  • Supine Ankle Pumps - 1 x daily - 5 x weekly - 2 sets - 10 reps  • Supine Heel Slide - 1 x daily - 5 x weekly - 2 sets - 10 reps  • Supine Bridge - 1 x daily - 3 x weekly - 2 sets - 10 reps  • Standing March with Counter Support - 1 x daily - 3 x weekly - 2 sets - 10 reps  • Side Stepping with Counter Support - 1 x daily - 3 x weekly - 5 sets  • Backward Walking with Counter Support - 1 x daily - 4 x weekly - 5 sets  • Mini Squat with Counter Support - 1 x daily - 4 x weekly - 3 sets - 10 reps  • Sit to Stand with Hands on Knees - 1 x daily - 4 x weekly - 2 sets - 10 reps

## 2023-05-23 ENCOUNTER — OFFICE VISIT (OUTPATIENT)
Dept: PHYSICAL THERAPY | Facility: CLINIC | Age: 83
End: 2023-05-23

## 2023-05-23 DIAGNOSIS — Z86.73 HISTORY OF CVA (CEREBROVASCULAR ACCIDENT): Primary | ICD-10-CM

## 2023-05-23 DIAGNOSIS — Z74.09 IMPAIRED FUNCTIONAL MOBILITY, BALANCE, GAIT, AND ENDURANCE: ICD-10-CM

## 2023-05-23 NOTE — PROGRESS NOTES
"Daily Note     Today's date: 2023  Patient name: Lorne Samayoa  : 1940  MRN: [id number]  Referring provider: Molly Morris PA-C  Dx:   Encounter Diagnosis     ICD-10-CM    1  History of CVA (cerebrovascular accident)  Z86 73       2  Impaired functional mobility, balance, gait, and endurance  Z74 09                      Subjective: Chelsea Hyatt reports that she has not heard anything about the brace yet  She states that her leg tends to feel better after therapy  Objective: See treatment diary below  Group from 3258-8419  1:1 with PT from 7040-3353    Assessment: Tolerated treatment well  Patient demonstrated fatigue post treatment, exhibited good technique with therapeutic exercises and would benefit from continued PT   Improved ability to perform sit to stand during session without UE support compared to re-evaluation last session  Improved ability to perform step tap with L LE with repetitions, most difficulty with bring off step backwards  Chelsea Hyatt reported decreased L foot discomfort at end of session  Chelsea Hyatt would benefit from continued physical therapy to improve mobility, improve gait, decrease L foot discomfort, improve functional mobility, maximize function  Continue per current POC  Plan: Continue per plan of care        Precautions: HTN, a-fib, pacemaker        Manuals 5/2 5/9 5/12 5/15 5/18 5/23                                       Neuro Re-Ed     FGA   TUG x 2 reps    Standing marching         sidesteps      SOLO  1/4 x 3 laps   HKM    SOLO  1/4 lap x 3 laps  SOLO  1/4 x 3 laps   Sit to stand with LE elevated   4\" step x 10 reps with BUE's and cues for upright posture      Step taps   6-inch block x 12 reps each, single UE support   6-inch step x 8 reps each, 3# AW on L LEf   Resisted walking SOLO  Maroon TB at waist no AW 1/4 lap x 2 laps SOLO  Maroon TB at waist no AQ 1/4 lap x 2 laps  SOLO  Blue TB around waist 1/4 lap x 2 laps  SOLO  Blue TB around waist 1/4 lap x 2 laps with " 3# AW on L LE   backwards   // bars (short) x 4 laps UE support on therapist and cues for upright posture SOLO  1/4 lap x 4 laps In hallway 30' x 2 reps    hurdles SOLO  4-inch hurdles (5) x 2 laps forward, leading with L LE SOLO  4-inch hurdles (5) x 2 laps forward, leading with L LE Refused due to pain      Step up    SOLO  6-inch step  X 10 reps up and over leading up with R LE, down with R LE    Step up and back, leading with L LE up, R LE down x 10 reps Staircase x 3 laps with BUE support SOLO   With 3# AW on L LE  L UE support on rail, x 6 laps step over step pattern   Tall kneeling         foam   Stepping on/off 2 foam pads x 3 laps forward, 3 laps laterally, single UE support in /// bars SOLO  Stepping on/off foam pads (4) forward x 3 laps     Bolster kick         Bean bag  from floor   X 10 reps, then x 10 reps with toss to end of bars, no UE support      Resisted lean         amb avoiding obstacles   // bars (short) with bean bags x 3 laps, no UE support      Ther Ex     6MWT with  feet    5xSTS with BUE's x 2 reps    Sit to stand  Sit to stand x 12 reps with BUE support  6 reps hand on L arm rest and R hand on L knee  6 reps of R LE on 4-inch step    Sit to stand x 10 reps with BUE support on L LE  Sit to stand x 15 reps with BUE's on L LE    X 5 reps 17 7 seconds   nustep         treadmill         Manual stretching                                                 Ther Activity         Floor transfer         Bed mobility                  Gait Training         Gait with SPC     In hallway x 5 laps    With FWW in hallway   In hallway with FWW, fast walking x 3 laps      Gait training on TM SOLO  BUE support and L ALS AFO (medium) 1 4mph for 10 minutes SOLO  BUE support no brace, started with 4# AW on L LE at 1  2mph (ankle weight fell off), increased to 1  5mph for 10 minutes total    SOLO  BUE support with 3# AW on L LE, 1 2mph x 7 minutes   Amb without AD         staircase         Education Education on AFO and donning/doffing, skin check    Tone management techniques, role of weight bearing with ambulation and tone, progress with therapy, POC, purpose of AFO Tone management with WB and bracing, motor learning with repetitions                                        Access Code: [email or web address]  URL: [email or web address]  Date: 03/06/2023  Prepared by: Alton Kearney    Exercises  • Seated Hamstring Stretch with Strap - 1 x daily - 7 x weekly - 3 sets - 30 hold  • Seated Gastroc Stretch with Strap - 1 x daily - 7 x weekly - 3 sets - 30 hold  • Seated Long Arc Quad - 1 x daily - 5 x weekly - 3 sets - 10 reps  • Seated March - 1 x daily - 5 x weekly - 3 sets - 10 reps  • Seated Heel Raise - 1 x daily - 5 x weekly - 3 sets - 10 reps  • Seated Hip Adduction Isometrics with Ball - 1 x daily - 5 x weekly - 3 sets - 10 reps - 5 hold  • Seated Hip Abduction - 1 x daily - 5 x weekly - 3 sets - 10 reps  • Seated Toe Raise - 1 x daily - 7 x weekly - 2 sets - 10 reps  • Supine Ankle Pumps - 1 x daily - 5 x weekly - 2 sets - 10 reps  • Supine Heel Slide - 1 x daily - 5 x weekly - 2 sets - 10 reps  • Supine Bridge - 1 x daily - 3 x weekly - 2 sets - 10 reps  • Standing March with Counter Support - 1 x daily - 3 x weekly - 2 sets - 10 reps  • Side Stepping with Counter Support - 1 x daily - 3 x weekly - 5 sets  • Backward Walking with Counter Support - 1 x daily - 4 x weekly - 5 sets  • Mini Squat with Counter Support - 1 x daily - 4 x weekly - 3 sets - 10 reps  • Sit to Stand with Hands on Knees - 1 x daily - 4 x weekly - 2 sets - 10 reps

## 2023-05-26 ENCOUNTER — PATIENT OUTREACH (OUTPATIENT)
Dept: INTERNAL MEDICINE CLINIC | Facility: CLINIC | Age: 83
End: 2023-05-26

## 2023-05-26 DIAGNOSIS — Z78.9 NEEDS ASSISTANCE WITH COMMUNITY RESOURCES: Primary | ICD-10-CM

## 2023-05-26 NOTE — PROGRESS NOTES
SW has reached out to pt via  ID # [id number] as per request from our Financial Counselor who relates pt has indicated he received a call from her requesting help to get additional PA Waiver Hours  SW has attempted to reach her re same but had to leave a message  Pt has already appealed the amount she was approved for back in October 2022 ( 63 hours)  Pt had wanted 24 hour care at home but that is seldom granted  Pt had pursued this with her  Randlal Dempsey through [employer] her PA Waiver Provider  Mis has also reached out to her son Shane Nicole [phone]  SW reviewed that pt had asked for help to get her PA Waiver  hours increased  Son is aware the decision must be made by the Service Coordination Agency and her   Last fall pt has her hours increased to 63/ week but 24 hours per day requested  The Appeal was apparently denied  Son shares that she is up for re-evaluation this September 2023  He is aware he can ask [employer] again for letters of support to increase the Hours  He said that their [address] [place] [address] Street previously shared that if they continue to request 24 hour care and they can not prove this need they could take away all the hours  Mis reviewed that this may because they then they feel it is not safe and a nursing home is needed  Pt and son are trying to avoid this  SW did praise son as he has been doing a wonderful job caring for his Mother  SW did review that placement maybe needed at some point for pt's safety  Son has received FMLA approval from his employer,   [employer]  and has had his hours adjusted to help accommodate her needs  He still fears he could loose his job after 28 years  They only other family that could help lives in [place] and [place]     Son discusses the 2 X weekly OP PT services as well as her many medical appointments are difficult to manage     SW has asked if home PT might be helpful but did relay it may be less intense therapy  Son will review same with pt and let our Office know  Support provided  Sw has asked for pt/ son to f/u with Sw if needed

## 2023-05-30 ENCOUNTER — TELEPHONE (OUTPATIENT)
Dept: NEUROLOGY | Facility: CLINIC | Age: 83
End: 2023-05-30

## 2023-05-30 NOTE — TELEPHONE ENCOUNTER
MSW was notified by JULIOCESAR Chao at [facility] that patient is interested in receiving increased hours of care from the waiver  Patient is currently receiving 63 hours of care via the Waiver  Patient/family considered appealing the denial for more hours of care in the fall but to this writer's knowledge ultimately decided not to appeal and kept the 63 hours of care  Андрей Ruffin stated that she was unable to reach patient about her request, so she did phone patient's son, Shane Nicole  He is aware the decision must be made by the Service Coordination Agency and her   [facility] shared that is up for re-evaluation in September 2023  He is aware he can ask [employer] again for letters of support to increase the hours, but he is aware that every time there is a reassessment, the hours may be increased/decreased  Андрей Ruffin did inform [facility] that placement maybe needed at some point for patient's safety      MSW will reach out to [facility] to see if he is interested in neurology writing a Letter of Medical Necessity in hopes of getting increased hours of care via the waiver

## 2023-05-31 ENCOUNTER — OFFICE VISIT (OUTPATIENT)
Dept: SLEEP CENTER | Facility: CLINIC | Age: 83
End: 2023-05-31

## 2023-05-31 VITALS
HEART RATE: 89 BPM | WEIGHT: 145 LBS | BODY MASS INDEX: 25.69 KG/M2 | HEIGHT: 63 IN | SYSTOLIC BLOOD PRESSURE: 143 MMHG | DIASTOLIC BLOOD PRESSURE: 71 MMHG

## 2023-05-31 DIAGNOSIS — G47.33 OSA (OBSTRUCTIVE SLEEP APNEA): ICD-10-CM

## 2023-05-31 NOTE — LETTER
May 31, 2023     Maxwell Davey MD  [address] [place] [id number]    Patient: Dejah Eldridge   YOB: 1940   Date of Visit: 5/31/2023       Dear Dr Stone Persons:    Thank you for referring Serena Castleman to me for evaluation  Below are my notes for this consultation  If you have questions, please do not hesitate to call me  I look forward to following your patient along with you  Sincerely,        Issac Garrett DO        CC: No Recipients    Issac Garrett DO  5/31/2023 10:44 AM  Sign when Signing Visit  Sleep Consultation   Dejah Eldridge 80 y o  female MRN: [id number]      Reason for consultation: RICH    Requesting physician: Dr Stone Persons    Assessment/Plan  80 y o  F with PMHx of Hyperlipidemia, GERD, allergic rhinitis, Pulmonary HTN, R MCA bifurcation cerebral thrombus, L renal artery stenosis, chronic atrial fibrillation, B12 deficiency who comes in for evaluation of snoring and excessive daytime sleepiness  1   Severe sleep apnea, majority obstructive - previously with RICH failed CPAP due to tolerance  -  Check an in lab titration to assess for obstructive sleep apnea  Would like to ensure central apnea does not worsen on PAP therapy  -  I discussed in depth the diagnostic studies and treatment options involved with obstructive sleep apnea      -  I also discussed in depth the risk of leaving sleep apnea untreated including hypertension, heart failure, arrhythmia, MI and stroke  -  The patient is agreeable to undergo testing and treatment of obstructive sleep apnea  She understands that pitfalls she may encounter along the way and is willing to attempt CPAP treatment  2   Periodic breathings on sleep study noted - no clear Cheyne ball and no evidence of systolic heart failure on echo  Will move forward with CPAP  If central apnea continues to be a problem, we will need to consider ASV        3   Pulmonary hypertension and Afib - we discussed the association of this with sleep apnea  She will do her best to be consistent with PAP therapy  History of Present Illness   HPI:  Renetta Guzman is a 80 y o  female with PMHx as below who comes in for management of apnea  She was diagnosed with RICH about 12 years ago and tried CPAP  She was seen by Dr Rosey Lawler and managed with CPAP  She struggled with tolerance and stopped using it  However, due to cardiac issues including afib, pulmonary hypertension, she was encouraged to start therapy again  Patient notes difficulty falling asleep, snoring, witnessed apnea and awakening with dry mouth  She denies excessive daytime sleepiness with an Melrose score of 4, awakenings with gasping, or morning headaches  she denies symptoms of restless legs  she denies symptoms of cataplexy, sleep paralysis, hypnopompic or hypnagogic hallucinations  Sleep History:  she goes to bed at approximately 11, will get to sleep in 30 min, will get out of bed at 5 am   she will lay in bed for 1-2 hrs and then get up at 8 am   She also gets up 2 times at night for unknown reason  She does not typically nap during the day   ROS:   Review of Systems   Constitutional: Positive for fatigue  Negative for appetite change and unexpected weight change  HENT: Negative  Eyes: Negative  Respiratory: Positive for apnea  Negative for shortness of breath and wheezing  Gastrointestinal: Negative  Endocrine: Negative  Genitourinary: Negative  Musculoskeletal: Negative  Skin: Negative  Allergic/Immunologic: Negative  Neurological: Negative  Hematological: Negative            Historical Information   Past Medical History:   Diagnosis Date   • [facility]    • Anemia    • Arthritis    • Breast pain, right    • Chest pain on breathing    • Colon polyp    • Cough    • Diverticulosis    • Encounter for screening colonoscopy    • H/O sick sinus syndrome    • Heart murmur    • High cholesterol • History of atrial fibrillation     Rate ontrolled  On xarelto 15mg (creatinine 50) Followed by Dr Todd Barba with Cardiology    • History of weight loss     Report loose fitting clothes  Weight stable (3lbs difference)  Last colo showed small tubular adenoma x2  Breast biopsy last showed fibrocystic changes  TSH wnl  Will check cbc, bmp, spep  • Hx of long term use of blood thinners    • Hypertension    • Irregular heart beat    • RICH (obstructive sleep apnea)    • Pacemaker    • Preop examination    • Shortness of breath    • Viral bronchitis      Past Surgical History:   Procedure Laterality Date   • BREAST BIOPSY Right 08/17/2006    ultrasound guided Percutaneous Needle Core -Benign   • CATARACT EXTRACTION     • CATARACT EXTRACTION W/ INTRAOCULAR LENS  IMPLANT, BILATERAL     • COLONOSCOPY     • COLONOSCOPY N/A 5/22/2018    Procedure: COLONOSCOPY;  Surgeon: Caitlin Rodas DO;  [facility]; Service: Gastroenterology   • Andrews Reynolds / Syed Askew / Lesly Neff     • LEG SURGERY Right     as a child after a fall   • MAMMO STEREOTACTIC BREAST BIOPSY RIGHT (ALL INC) Right 10/2014    benign   • ND CMBND ANTERPOST COLPORRAPHY W/CYSTO N/A 3/17/2016    Procedure: COLPORRHAPHY ANTERIOR POSTERIOR ;  Surgeon: Lauri Rebolledo MD;  [facility];  Service: Gynecology   • ND COLONOSCOPY FLX DX W/COLLJ SPEC WHEN PFRMD N/A 4/4/2019    Procedure: COLONOSCOPY;  Surgeon: Caitlin Rodas DO;  [facility];   Service: Gastroenterology   • ND COLPOPEXY VAGINAL EXTRAPERITONEAL APPROACH N/A 3/17/2016    Procedure: COLPOPEXY VAGINAL EXTRAPERITONEAL (VEC) ANTERIOR ;  Surgeon: Lauri Rebolledo MD;  [facility];  Service: Gynecology   • ND CYSTOURETHROSCOPY N/A 3/17/2016    Procedure: CYSTOSCOPY;  Surgeon: Lauri Rebolledo MD;  [facility];  Service: Gynecology   • ND ESOPHAGOGASTRODUODENOSCOPY TRANSORAL DIAGNOSTIC N/A 4/16/2018    Procedure: EGD AND COLONOSCOPY;  Surgeon: Michael Case DO Suhail;  [facility]; Service: Gastroenterology   • OR LAPAROSCOPY COLECTOMY PARTIAL W/ANASTOMOSIS Right 1/13/2022    Procedure: Diagnostic laparoscopy, laparscopic right colectomy;  Surgeon: Angélica Duong MD;  Location:  MAIN OR;  Service: Colorectal   • OR SLING OPERATION STRESS INCONTINENCE N/A 3/17/2016    Procedure: INSERTION PUBOVAGINAL SLING SINGLE INCISION ;  Surgeon: Marcia Funk MD;  [facility];  Service: Gynecology     Family History   Problem Relation Age of Onset   • Diabetes Mother    • Breast cancer Sister 48   • No Known Problems Father    • No Known Problems Maternal Grandmother    • No Known Problems Maternal Grandfather    • No Known Problems Paternal Grandmother    • No Known Problems Paternal Grandfather    • No Known Problems Daughter    • No Known Problems Son    • No Known Problems Sister    • No Known Problems Sister    • No Known Problems Brother    • No Known Problems Brother    • No Known Problems Sister    • No Known Problems Paternal Aunt    • No Known Problems Paternal Aunt    • No Known Problems Paternal Aunt    • No Known Problems Paternal Aunt      Social History     Socioeconomic History   • Marital status:       Spouse name: Not on file   • Number of children: Not on file   • Years of education: Not on file   • Highest education level: Not on file   Occupational History   • Occupation: retired   Tobacco Use   • Smoking status: Never   • Smokeless tobacco: Never   Vaping Use   • Vaping Use: Never used   Substance and Sexual Activity   • Alcohol use: Never   • Drug use: No   • Sexual activity: Not Currently     Comment:    Other Topics Concern   • Not on file   Social History Narrative    Housing, household, and economic circumstances      Social Determinants of Health     Financial Resource Strain: Low Risk  (5/10/2023)    Overall Financial Resource Strain (CARDIA)    • Difficulty of Paying Living Expenses: Not hard at all Food Insecurity: No Food Insecurity (5/10/2023)    Hunger Vital Sign    • Worried About Running Out of Food in the Last Year: Never true    • Ran Out of Food in the Last Year: Never true   Transportation Needs: No Transportation Needs (5/10/2023)    PRAPARE - Transportation    • Lack of Transportation (Medical): No    • Lack of Transportation (Non-Medical): No   Physical Activity: Inactive (4/14/2021)    Exercise Vital Sign    • Days of Exercise per Week: 0 days    • Minutes of Exercise per Session: 0 min   Stress: No Stress Concern Present (4/14/2021)    Gina7 Ross Odom    • Feeling of Stress : Not at all   Social Connections: Moderately Isolated (4/14/2021)    Social Connection and Isolation Panel [NHANES]    • Frequency of Communication with Friends and Family: More than three times a week    • Frequency of Social Gatherings with Friends and Family: More than three times a week    • Attends [employer] Services: More than 4 times per year    • Active Member of Clubs or Organizations: No    • Attends Club or Organization Meetings: Never    • Marital Status:    Intimate Partner Violence: Not At Risk (4/14/2021)    Humiliation, Afraid, Rape, and Kick questionnaire    • Fear of Current or Ex-Partner: No    • Emotionally Abused: No    • Physically Abused: No    • Sexually Abused: No   Housing Stability: Unknown (8/13/2022)    Housing Stability Vital Sign    • Unable to Pay for Housing in the Last Year: No    • Number of Places Lived in the Last Year: Not on file    • Unstable Housing in the Last Year: No       Occupational History: Formerly worked in [employer]    Meds/Allergies   Allergies   Allergen Reactions   • Other Itching     Bandaids   • Tape [Medical Tape] Itching       Home medications:  Prior to Admission medications    Medication Sig Start Date End Date Taking?  Authorizing Provider   acetaminophen (TYLENOL) 325 mg tablet Take 2 tablets (650 mg total) by mouth every 6 (six) hours as needed for mild pain 9/6/22  Yes Israel Hoffman MD   ammonium lactate (LAC-HYDRIN) 12 % cream Apply topically as needed for dry skin 5/10/23  Yes Peyton Steward DPM   Blood Pressure Monitoring (Blood Pressure Cuff) MISC Use every other day For HTN 4/28/21  Yes Antonio FriendGATITO   furosemide (LASIX) 20 mg tablet Take 2 tablets (40 mg total) by mouth daily 11/23/22  Yes Aranza Rodriguez MD   Incontinence Supply Disposable (RA Adult Wipes) MISC Use 4 (four) times a day 10/7/22  Yes Aranza Rodriguez MD   losartan (COZAAR) 50 mg tablet TAKE 1 TABLET (50 MG TOTAL) BY MOUTH DAILY 1/11/23  Yes Alfonso Vaughn MD   melatonin 3 mg Take 1 tablet (3 mg total) by mouth every evening 5/10/23  Yes Aranza Rodriguez MD   menthol-methyl salicylate (BENGAY) 87-50 % cream Apply topically 2 (two) times a day 9/7/22  Yes Israel Hoffman MD   nebivolol (BYSTOLIC) 5 mg tablet Take 1 tablet (5 mg total) by mouth daily 10/10/22  Yes Alfonso Vaughn MD   nitroglycerin (NITROSTAT) 0 4 mg SL tablet Place 1 tablet (0 4 mg total) under the tongue every 5 (five) minutes as needed for chest pain 9/6/22  Yes Israel Hoffman MD   pantoprazole (PROTONIX) 40 mg tablet TAKE 1 TABLET (40 MG TOTAL) BY MOUTH DAILY IN THE EARLY MORNING 1/11/23  Yes Alfonso Vaughn MD   Pradaxa 150 MG capsu TAKE 1 CAPSULE (150 MG TOTAL) BY MOUTH 2 (TWO) TIMES A DAY 2/9/23  Yes Alfonso Vaughn MD   rosuvastatin (CRESTOR) 10 MG tablet TAKE 0 5 TABLETS (5 MG TOTAL) BY MOUTH DAILY 1/11/23  Yes Alfonso Vaughn MD   verapamil (CALAN-SR) 240 mg CR tablet Take 1 tablet (240 mg total) by mouth daily at bedtime 10/10/22  Yes Alfonso Vaughn MD   cyanocobalamin 1,000 mcg/mL Inject 1 mL (1,000 mcg total) into a muscle every 30 (thirty) days  Patient not taking: Reported on 5/31/2023 3/13/23 6/11/23  Alfonso Vaughn MD   polyethylene glycol (MIRALAX) 17 g packet Take 17 g by mouth daily as needed (First line and refer to bowel "protocol)  Patient not taking: Reported on 5/31/2023 9/6/22   Eleazar Archer MD   potassium chloride (K-DUR,KLOR-CON) 10 mEq tablet Take 2 tablets (20 mEq total) by mouth daily  Patient not taking: Reported on 2/15/2023 10/28/22   Marcell Quintana MD   apixaban (Eliquis) 5 mg Take 1 tablet (5 mg total) by mouth 2 (two) times a day 8/14/22 8/18/22  Chandu Glass DO       Vitals:   Blood pressure 143/71, pulse 89, height 5' 3\" (1 6 m), weight 65 8 kg (145 lb)  , RA, Body mass index is 25 69 kg/m²  Neck Circumference: 15    Physical Exam  General: Pleasant, Awake alert and oriented x 3, conversant without conversational dyspnea, NAD, normal affect  HEENT:  PERRL, Sclera noninjected, nonicteric OU, Nares patent,  no craniofacial abnormalities, Mucous membranes, moist, no oral lesions, normal dentition, Mallampati class 4  NECK: Trachea midline, no accessory muscle use, no stridor, no cervical or supraclavicular adenopathy, JVP not elevated  CARDIAC: Reg, single s1/S2, no m/r/g  PULM: CTA bilaterally no wheezing, rhonchi or rales  ABD: Normoactive bowel sounds, soft nontender, nondistended, no rebound, no rigidity, no guarding  EXT: No cyanosis, no clubbing, no edema, normal capillary refill  NEURO: no focal neurologic deficits, AAOx3, moving all extremities appropriately    Labs: I have personally reviewed pertinent lab results    Lab Results   Component Value Date    HCT 32 4 (L) 09/12/2022    HGB 10 4 (L) 09/12/2022    MCV 94 09/12/2022     09/12/2022    WBC 6 57 09/12/2022      Lab Results   Component Value Date    BUN 18 09/12/2022    CALCIUM 8 7 09/12/2022     (H) 09/12/2022    CO2 25 09/12/2022    CREATININE 0 92 09/12/2022    GLUCOSE 88 11/25/2015    K 3 9 09/12/2022     11/25/2015     Lab Results   Component Value Date    FERRITIN 108 01/16/2019    IRON 89 01/16/2019    TIBC 288 01/16/2019     Lab Results   Component Value Date    FNFAANNX26 404 05/25/2022     Sleep " studies:  Diagnostic 12 years ago at [facility] -  for RICH     Diagnostic:  IMPRESSION:   1  Severe sleep apnea  It should be noted that the events were essentially all hypopneas, moderate snoring was noted  Cheyne-Harrison breathing was not present although a periodic breathing pattern was noted at times during the record  In the absence of central or obstructive apneas, it is hard to fully determine whether this represents obstructive or central disease but more likely obstructive apnea is favored  2  Normal baseline oxygen saturation  Mild to moderate desaturations present with respiratory events  3  Sleep efficiency was low  Increased Stage N1 sleep (light sleep) was present    Stage N3 sleep (deep sleep) was decreased  REM sleep percentage was decreased on this test  Severe sleep fragmentation was present  4  No evidence of periodic limb movements during sleep  5  EKG shows a paced rhythm, p waves not present in EKG  As a full EKG is not used on this test, only limited assessment is available  6  A full EEG was used on this test   Epileptiform activity was not present  Abnormal behaviors were not present  RECOMMENDATION:  A CPAP titration study is recommended due to sleep apnea and hypoxemia  Sleep medicine consultation also recommended  PFT  Interpretation:  • Normal spirometry  • No significant improvement in airflow or forced vital capacity in response to the administration to bronchodilator per ATS standards  • Normal lung volumes  • Mild decrease in diffusion capacity  • Normal flow-volume loop                                     CXR -   IMPRESSION:  No acute cardiopulmonary disease      DO Jazz Medina [facility] Sleep Physician

## 2023-05-31 NOTE — TELEPHONE ENCOUNTER
MSW phoned patient's son, Vlad Peraza, at [phone] to see if he needed anything from this writer  Donal stated that patient will be having her re-evaluation in September 2023  Donal stated that he is unsure if they will request more hours, that they will wait to see how patient is doing at that time  Donal stated that they do not want to jeopardize the care that patient is currently receiving  Donal is aware that he can contact this writer if they do want/need a letter of medical necessity for more hours  Donal stated that patient is concerned that there may be billing issues with her care  MSW stated that from what this writer can see - [facility] is listed as primary and [facility] is listed as secondary  MSW did provide the [facility] billing number as [phone] in case patient/family wanted to discuss concerns with that department  MSW will be available to patient/son as needed

## 2023-06-01 ENCOUNTER — OFFICE VISIT (OUTPATIENT)
Dept: PHYSICAL THERAPY | Facility: CLINIC | Age: 83
End: 2023-06-01

## 2023-06-01 DIAGNOSIS — Z86.73 HISTORY OF CVA (CEREBROVASCULAR ACCIDENT): Primary | ICD-10-CM

## 2023-06-01 DIAGNOSIS — Z74.09 IMPAIRED FUNCTIONAL MOBILITY, BALANCE, GAIT, AND ENDURANCE: ICD-10-CM

## 2023-06-01 NOTE — PROGRESS NOTES
"Daily Note     Today's date: 2023  Patient name: Amanda Carcamo  : 1940  MRN: [id number]  Referring provider: Elke Hitchcock PA-C  Dx:   Encounter Diagnosis     ICD-10-CM    1  History of CVA (cerebrovascular accident)  Z86 73       2  Impaired functional mobility, balance, gait, and endurance  Z74 09           Start Time: 0900  Stop Time: 945  Total time in clinic (min): 45 minutes    Subjective: Reports that she is doing well! Notes that she is walking around the home much more often  Objective: See treatment diary below      Assessment: Tolerated treatment well  Continued with SOLO treadmill training, with intention to increase duration of continuous ambulation - able to achieve 10 mins with same intensity as prior session with only minor increase in RPE and fatigue noted  Good motivation for participation in therapy throughout session trying to improve functional mobility and independence  Decreased clearance and step height most evident during HKM on LLE compared to R, as well as decreased arm swing  Reported some toe clawing during side stepping more so than any other exercise during today's session  Verbal cuing for increased anterior weight shift as well as set up to improve sit <> stand performance without use of UE  Intermittent scuffing with LAW donned during gait with SPC but maintained good and steady step through pattern  Patient demonstrated fatigue post treatment, exhibited good technique with therapeutic exercises and would benefit from continued PT      Plan: Continue per plan of care  Progress treatment as tolerated         Precautions: HTN, a-fib, pacemaker        Manuals 6/1 5/9 5/12 5/15 5/18 5/23                                       Neuro Re-Ed     FGA   TUG x 2 reps    Standing marching         sidesteps SOLO 1/4 x 3 laps #3 LAW     SOLO  1/4 x 3 laps   HKM SOLO 1/4 x3 laps #3 LAW   SOLO  1/4 lap x 3 laps  SOLO  1/4 x 3 laps   Sit to stand with LE elevated   4\" step " x 10 reps with BUE's and cues for upright posture      Step taps   6-inch block x 12 reps each, single UE support   6-inch step x 8 reps each, 3# AW on L LEf   Resisted walking SOLO purple TB through harness  1/4 lap 2x2 laps #3 LAW  SOLO  Maroon TB at waist no AQ 1/4 lap x 2 laps  SOLO  Blue TB around waist 1/4 lap x 2 laps  SOLO  Blue TB around waist 1/4 lap x 2 laps with 3# AW on L LE   backwards   // bars (short) x 4 laps UE support on therapist and cues for upright posture SOLO  1/4 lap x 4 laps In hallway 30' x 2 reps    hurdles  SOLO  4-inch hurdles (5) x 2 laps forward, leading with L LE Refused due to pain      Step up    SOLO  6-inch step  X 10 reps up and over leading up with R LE, down with R LE    Step up and back, leading with L LE up, R LE down x 10 reps Staircase x 3 laps with BUE support SOLO   With 3# AW on L LE  L UE support on rail, x 6 laps step over step pattern   Tall kneeling         foam   Stepping on/off 2 foam pads x 3 laps forward, 3 laps laterally, single UE support in /// bars SOLO  Stepping on/off foam pads (4) forward x 3 laps     Bolster kick         Bean bag  from floor   X 10 reps, then x 10 reps with toss to end of bars, no UE support      Resisted lean         amb avoiding obstacles   // bars (short) with bean bags x 3 laps, no UE support      Ther Ex     6MWT with  feet    5xSTS with BUE's x 2 reps    Sit to stand Sit <> Stand 3x5  BUE on LLE   1 25 6 s   2 22 6 s  3 19 5 s   Sit to stand x 12 reps with BUE support  6 reps hand on L arm rest and R hand on L knee   6 reps of R LE on 4-inch step    Sit to stand x 10 reps with BUE support on L LE  Sit to stand x 15 reps with BUE's on L LE    X 5 reps 17 7 seconds   nustep         treadmill         Manual stretching                                                 Ther Activity         Floor transfer         Bed mobility                  Gait Training         Gait with SPC Hallway 1x laps SPC with #3 LAW     In hallway x 5 laps    With FWW in hallway   In hallway with FWW, fast walking x 3 laps      Gait training on TM SOLO BUE support with #3 LAW     1 2 mph x 10 mins SOLO  BUE support no brace, started with 4# AW on L LE at 1  2mph (ankle weight fell off), increased to 1  5mph for 10 minutes total    SOLO  BUE support with 3# AW on L LE, 1 2mph x 7 minutes   Amb without AD         staircase         Education     Tone management techniques, role of weight bearing with ambulation and tone, progress with therapy, POC, purpose of AFO Tone management with WB and bracing, motor learning with repetitions                                        Access Code: [email or web address]  URL: [email or web address]  Date: 03/06/2023  Prepared by: Jeanette Farnsworth    Exercises  • Seated Hamstring Stretch with Strap - 1 x daily - 7 x weekly - 3 sets - 30 hold  • Seated Gastroc Stretch with Strap - 1 x daily - 7 x weekly - 3 sets - 30 hold  • Seated Long Arc Quad - 1 x daily - 5 x weekly - 3 sets - 10 reps  • Seated March - 1 x daily - 5 x weekly - 3 sets - 10 reps  • Seated Heel Raise - 1 x daily - 5 x weekly - 3 sets - 10 reps  • Seated Hip Adduction Isometrics with Ball - 1 x daily - 5 x weekly - 3 sets - 10 reps - 5 hold  • Seated Hip Abduction - 1 x daily - 5 x weekly - 3 sets - 10 reps  • Seated Toe Raise - 1 x daily - 7 x weekly - 2 sets - 10 reps  • Supine Ankle Pumps - 1 x daily - 5 x weekly - 2 sets - 10 reps  • Supine Heel Slide - 1 x daily - 5 x weekly - 2 sets - 10 reps  • Supine Bridge - 1 x daily - 3 x weekly - 2 sets - 10 reps  • Standing March with Counter Support - 1 x daily - 3 x weekly - 2 sets - 10 reps  • Side Stepping with Counter Support - 1 x daily - 3 x weekly - 5 sets  • Backward Walking with Counter Support - 1 x daily - 4 x weekly - 5 sets  • Mini Squat with Counter Support - 1 x daily - 4 x weekly - 3 sets - 10 reps  • Sit to Stand with Hands on Knees - 1 x daily - 4 x weekly - 2 sets - 10 reps

## 2023-06-05 ENCOUNTER — OFFICE VISIT (OUTPATIENT)
Dept: PHYSICAL THERAPY | Facility: CLINIC | Age: 83
End: 2023-06-05
Payer: MEDICARE

## 2023-06-05 DIAGNOSIS — Z74.09 IMPAIRED FUNCTIONAL MOBILITY, BALANCE, GAIT, AND ENDURANCE: ICD-10-CM

## 2023-06-05 DIAGNOSIS — Z86.73 HISTORY OF CVA (CEREBROVASCULAR ACCIDENT): Primary | ICD-10-CM

## 2023-06-05 PROCEDURE — 97112 NEUROMUSCULAR REEDUCATION: CPT

## 2023-06-05 PROCEDURE — 97116 GAIT TRAINING THERAPY: CPT

## 2023-06-05 NOTE — PROGRESS NOTES
"Daily Note     Today's date: 2023  Patient name: Angela Marques  : 1940  MRN: [id number]  Referring provider: Nathan Valdez PA-C  Dx:   Encounter Diagnosis     ICD-10-CM    1  History of CVA (cerebrovascular accident)  Z86 73       2  Impaired functional mobility, balance, gait, and endurance  Z74 09                      Subjective: Loretta Lawson reports that she has an appointment to get her brace on the   She reports that she had her toenails cut by her home attendant  She reports that her toes still bother her, but not as much as before  Objective: See treatment diary below  Self-directed exercise from [id number] 1:1 with PT    Assessment: Tolerated treatment well  Was able to ambulate for 10 minutes consecutively with ankle weight during session  Attempted resisted walking laterally during session, improving step length with repetition, however only able to perform with light resistance when moving to the R side due to decreased power generation and impaired balance  Continues to have difficulty with backwards walking, cues to increase step length on L LE  Improved L foot clearance with step taps with repetitions with decrease circumduction  Patient demonstrated fatigue post treatment, exhibited good technique with therapeutic exercises and would benefit from continued PT      Plan: Continue per plan of care  Progress treatment as tolerated         Precautions: HTN, a-fib, pacemaker        Manuals 6/1 6/5  5/12 5/15 5/18 5/23                                           Neuro Re-Ed      FGA   TUG x 2 reps    Standing marching          sidesteps SOLO 1/4 x 3 laps #3 LAW // bars (short) with 3# AW on L LE, x 4 laps     SOLO  1/4 x 3 laps   HKM SOLO 1/4 x3 laps #3 LAW // bars (short) with 3# AW on L LE x 4 laps   SOLO  1/4 lap x 3 laps  SOLO  1/4 x 3 laps   Sit to stand with LE elevated    4\" step x 10 reps with BUE's and cues for upright posture      Step taps  6-inch step with 3# AW " on L LE x 10 each  6-inch block x 12 reps each, single UE support   6-inch step x 8 reps each, 3# AW on L LEf   Resisted walking SOLO purple TB through harness  1/4 lap 2x2 laps #3 LAW  SOLO  Purple TB around waist  1/4 lap x 3 laps with 3# AW on L LE    Sidesteps purple TB 1/2 lap x 2 laps (minimal resistance moving to R side)   SOLO  Blue TB around waist 1/4 lap x 2 laps  SOLO  Blue TB around waist 1/4 lap x 2 laps with 3# AW on L LE   backwards  SOLO  3# AW on L LE 1/4 lap x 2 laps  // bars (short) x 4 laps UE support on therapist and cues for upright posture SOLO  1/4 lap x 4 laps In hallway 30' x 2 reps    hurdles    Refused due to pain      Step up     SOLO  6-inch step  X 10 reps up and over leading up with R LE, down with R LE    Step up and back, leading with L LE up, R LE down x 10 reps Staircase x 3 laps with BUE support SOLO   With 3# AW on L LE  L UE support on rail, x 6 laps step over step pattern   Tall kneeling          foam    Stepping on/off 2 foam pads x 3 laps forward, 3 laps laterally, single UE support in /// bars SOLO  Stepping on/off foam pads (4) forward x 3 laps     Bolster kick          Bean bag  from floor    X 10 reps, then x 10 reps with toss to end of bars, no UE support      Resisted lean          amb avoiding obstacles    // bars (short) with bean bags x 3 laps, no UE support      Ther Ex      6MWT with  feet    5xSTS with BUE's x 2 reps    Sit to stand Sit <> Stand 3x5   BUE on LLE   1 25 6 s   2 22 6 s  3 19 5 s   Sit to stand x 10 reps with BUE's on L LE   Sit to stand x 10 reps with BUE support on L LE  Sit to stand x 15 reps with BUE's on L LE    X 5 reps 17 7 seconds                       Manual stretching                                                      Ther Activity          Floor transfer          Bed mobility                    Gait Training          Gait with SPC Hallway 1x laps SPC with #3 LAW      In hallway x 5 laps    With FWW in hallway    In hallway with FWW, fast walking x 3 laps      Gait training on TM SOLO BUE support with #3 LAW     1 2 mph x 10 mins SOLO  BUE support with 3# AW on L,    1 2mph for 10 minutes total     SOLO  BUE support with 3# AW on L LE, 1 2mph x 7 minutes   Amb without AD          staircase          Education      Tone management techniques, role of weight bearing with ambulation and tone, progress with therapy, POC, purpose of AFO Tone management with WB and bracing, motor learning with repetitions                                           Access Code: [email or web address]  URL: [email or web address]  Date: 03/06/2023  Prepared by: Bhavesh Oseguera    Exercises  • Seated Hamstring Stretch with Strap - 1 x daily - 7 x weekly - 3 sets - 30 hold  • Seated Gastroc Stretch with Strap - 1 x daily - 7 x weekly - 3 sets - 30 hold  • Seated Long Arc Quad - 1 x daily - 5 x weekly - 3 sets - 10 reps  • Seated March - 1 x daily - 5 x weekly - 3 sets - 10 reps  • Seated Heel Raise - 1 x daily - 5 x weekly - 3 sets - 10 reps  • Seated Hip Adduction Isometrics with Ball - 1 x daily - 5 x weekly - 3 sets - 10 reps - 5 hold  • Seated Hip Abduction - 1 x daily - 5 x weekly - 3 sets - 10 reps  • Seated Toe Raise - 1 x daily - 7 x weekly - 2 sets - 10 reps  • Supine Ankle Pumps - 1 x daily - 5 x weekly - 2 sets - 10 reps  • Supine Heel Slide - 1 x daily - 5 x weekly - 2 sets - 10 reps  • Supine Bridge - 1 x daily - 3 x weekly - 2 sets - 10 reps  • Standing March with Counter Support - 1 x daily - 3 x weekly - 2 sets - 10 reps  • Side Stepping with Counter Support - 1 x daily - 3 x weekly - 5 sets  • Backward Walking with Counter Support - 1 x daily - 4 x weekly - 5 sets  • Mini Squat with Counter Support - 1 x daily - 4 x weekly - 3 sets - 10 reps  • Sit to Stand with Hands on Knees - 1 x daily - 4 x weekly - 2 sets - 10 reps

## 2023-06-07 ENCOUNTER — EVALUATION (OUTPATIENT)
Dept: PHYSICAL THERAPY | Facility: CLINIC | Age: 83
End: 2023-06-07
Payer: MEDICARE

## 2023-06-07 DIAGNOSIS — Z86.73 HISTORY OF CVA (CEREBROVASCULAR ACCIDENT): Primary | ICD-10-CM

## 2023-06-07 DIAGNOSIS — Z74.09 IMPAIRED FUNCTIONAL MOBILITY, BALANCE, GAIT, AND ENDURANCE: ICD-10-CM

## 2023-06-07 PROCEDURE — 97112 NEUROMUSCULAR REEDUCATION: CPT

## 2023-06-07 PROCEDURE — 97110 THERAPEUTIC EXERCISES: CPT

## 2023-06-07 NOTE — PROGRESS NOTES
"Daily Note     Today's date: 2023  Patient name: Shandra Woodruff  : 1940  MRN: [id number]  Referring provider: Augustin Camarillo PA-C  Dx:   Encounter Diagnosis     ICD-10-CM    1  History of CVA (cerebrovascular accident)  Z86 73       2  Impaired functional mobility, balance, gait, and endurance  Z74 09                      Subjective: Patient reports that her toes were bothering her yesterday and her leg was swollen  She reports she is not feeling good today  Objective: See treatment diary below      Assessment: Tolerated treatment fair  Patient would benefit from continued PT  Aneta Manzano was very limited due to pain in her L toes and ankle during session, refusing attempting using ankle weight and decreasing repetitions during session  Continue to educate on PROM and passive stretching of toes for pain management as well as tone management techniques, and how inactivity can make spasticity and tone worse  Patient wished to end session due to having pain at her L foot and toes  Recommended for patient to reach out to doctor/neurology due to pain in her foot interfering with therapy  Verbalized understanding  Plan: Continue per plan of care        Precautions: HTN, a-fib, pacemaker        Manuals 6/1 6/5 6/7  5/12 5/15 5/18 5/23                                               Neuro Re-Ed       FGA   TUG x 2 reps    Standing marching           sidesteps SOLO 1/4 x 3 laps #3 LAW // bars (short) with 3# AW on L LE, x 4 laps SOLO  1/4 lap x 3 laps  No AW     SOLO  1/4 x 3 laps   HKM SOLO 1/4 x3 laps #3 LAW // bars (short) with 3# AW on L LE x 4 laps SOLO  1/2 lap no AW x 2 laps   SOLO  1/4 lap x 3 laps  SOLO  1/4 x 3 laps   Sit to stand with LE elevated     4\" step x 10 reps with BUE's and cues for upright posture      Step taps  6-inch step with 3# AW on L LE x 10 each   6-inch block x 12 reps each, single UE support   6-inch step x 8 reps each, 3# AW on L LEf   Resisted walking SOLO purple TB through " harness  1/4 lap 2x2 laps #3 LAW  SOLO  Purple TB around waist  1/4 lap x 3 laps with 3# AW on L LE    Sidesteps purple TB 1/2 lap x 2 laps (minimal resistance moving to R side)    SOLO  Blue TB around waist 1/4 lap x 2 laps  SOLO  Blue TB around waist 1/4 lap x 2 laps with 3# AW on L LE   backwards  SOLO  3# AW on L LE 1/4 lap x 2 laps   // bars (short) x 4 laps UE support on therapist and cues for upright posture SOLO  1/4 lap x 4 laps In hallway 30' x 2 reps    hurdles     Refused due to pain      Step up      SOLO  6-inch step  X 10 reps up and over leading up with R LE, down with R LE    Step up and back, leading with L LE up, R LE down x 10 reps Staircase x 3 laps with BUE support SOLO   With 3# AW on L LE  L UE support on rail, x 6 laps step over step pattern   Tall kneeling           foam     Stepping on/off 2 foam pads x 3 laps forward, 3 laps laterally, single UE support in /// bars SOLO  Stepping on/off foam pads (4) forward x 3 laps     Bolster kick           Bean bag  from floor     X 10 reps, then x 10 reps with toss to end of bars, no UE support      Resisted lean           amb avoiding obstacles     // bars (short) with bean bags x 3 laps, no UE support      Ther Ex       6MWT with  feet    5xSTS with BUE's x 2 reps    Sit to stand Sit <> Stand 3x5   BUE on LLE   1 25 6 s   2 22 6 s  3 19 5 s   Sit to stand x 10 reps with BUE's on L LE Sit to stand with L UE support x 10 reps    Seated on airex x 10 reps   Sit to stand x 10 reps with BUE support on L LE  Sit to stand x 15 reps with BUE's on L LE    X 5 reps 17 7 seconds                         Manual stretching   Passive ROM of toes and ankle                                                        Ther Activity           Floor transfer           Bed mobility                      Gait Training           Gait with SPC Hallway 1x laps SPC with #3 LAW       In hallway x 5 laps    With FWW in hallway   In hallway with FWW x 4 laps  In hallway with FWW, fast walking x 3 laps      Gait training on TM SOLO BUE support with #3 LAW     1 2 mph x 10 mins SOLO  BUE support with 3# AW on L,    1 2mph for 10 minutes total      SOLO  BUE support with 3# AW on L LE, 1 2mph x 7 minutes   Amb without AD           staircase           Education   Education on tone management techniques (bracing, weight bearing, botox injections)    Tone management techniques, role of weight bearing with ambulation and tone, progress with therapy, POC, purpose of AFO Tone management with WB and bracing, motor learning with repetitions                                              Access Code: [email or web address]  URL: [email or web address]  Date: 03/06/2023  Prepared by: Giovanna Pierce    Exercises  • Seated Hamstring Stretch with Strap - 1 x daily - 7 x weekly - 3 sets - 30 hold  • Seated Gastroc Stretch with Strap - 1 x daily - 7 x weekly - 3 sets - 30 hold  • Seated Long Arc Quad - 1 x daily - 5 x weekly - 3 sets - 10 reps  • Seated March - 1 x daily - 5 x weekly - 3 sets - 10 reps  • Seated Heel Raise - 1 x daily - 5 x weekly - 3 sets - 10 reps  • Seated Hip Adduction Isometrics with Ball - 1 x daily - 5 x weekly - 3 sets - 10 reps - 5 hold  • Seated Hip Abduction - 1 x daily - 5 x weekly - 3 sets - 10 reps  • Seated Toe Raise - 1 x daily - 7 x weekly - 2 sets - 10 reps  • Supine Ankle Pumps - 1 x daily - 5 x weekly - 2 sets - 10 reps  • Supine Heel Slide - 1 x daily - 5 x weekly - 2 sets - 10 reps  • Supine Bridge - 1 x daily - 3 x weekly - 2 sets - 10 reps  • Standing March with Counter Support - 1 x daily - 3 x weekly - 2 sets - 10 reps  • Side Stepping with Counter Support - 1 x daily - 3 x weekly - 5 sets  • Backward Walking with Counter Support - 1 x daily - 4 x weekly - 5 sets  • Mini Squat with Counter Support - 1 x daily - 4 x weekly - 3 sets - 10 reps  • Sit to Stand with Hands on Knees - 1 x daily - 4 x weekly - 2 sets - 10 reps

## 2023-06-13 ENCOUNTER — CLINICAL SUPPORT (OUTPATIENT)
Dept: INTERNAL MEDICINE CLINIC | Facility: CLINIC | Age: 83
End: 2023-06-13

## 2023-06-13 DIAGNOSIS — E53.8 B12 DEFICIENCY: Chronic | ICD-10-CM

## 2023-06-13 PROCEDURE — 96372 THER/PROPH/DIAG INJ SC/IM: CPT | Performed by: INTERNAL MEDICINE

## 2023-06-13 RX ADMIN — CYANOCOBALAMIN 1000 MCG: 1000 INJECTION, SOLUTION INTRAMUSCULAR; SUBCUTANEOUS at 15:17

## 2023-06-13 NOTE — PROGRESS NOTES
Patient came in today 06/13/23   for a nurse visit for B-12 injection  Administered 1 mL in patientLeft deltoid  Patient tolerated well   Patient notified to return in 30 days for next injection      OXV: [id number]

## 2023-06-15 ENCOUNTER — APPOINTMENT (OUTPATIENT)
Dept: PHYSICAL THERAPY | Facility: CLINIC | Age: 83
End: 2023-06-15
Payer: MEDICARE

## 2023-06-15 NOTE — PROGRESS NOTES
Pt Re-evaluation    Today's date: 6/15/2023  Patient name: Louis Martinez  : 1940  MRN: [id number]  Referring provider: Darshana Cummings PA-C  Dx: No diagnosis found  Assessment    6/15/23 Day Zepeda has attended 11 sessions of physical therapy after resuming therapy post 30 day hold  23: Day Zepeda has attended 5 sessions of physical therapy after resuming therapy post 30 day hold  Day Zepeda is making progress with therapy, with 6MWT improving by 130 feet with FWW since last re-evaluation, improving from 500 feet to 628 feet  5xSTS time improved from best time of 20 4 seconds to 18 7 seconds, she continues to require bilateral UE support to perform  TUG demonstrated slight improvement from 21 4 seconds to 20 6 seconds  Self-selected gait speed improved from 0 39m/s (with SPC) to 0 46m/s, with more significant improvement in fast speed during 6MWT from 0 52m/s with FWW to 0 71m/s  3-meter backwards walking test with SPC improved from 25 8 seconds to 19 9 seconds with significant decrease in hesitancy with backwards walking  FGA did not demonstrate significant change  Day Zepeda is demonstrating improvement in endurance, LE strength and endurance, improved gait speed and gait speed variability since resuming therapy  She is in the process of waiting to receive AFO for L LE, and would benefit from continued physical therapy for gait training, orthotic management and education, education on skin checks and wearing schedule for AFO  She would continue to benefit from skilled physical therapy to improve balance confidence, improve LE strength and endurance, improve cardiovascular endurance and walking tolerance, decrease fall risk, and maximize function  Continue 2x/week for 4 additional weeks      23: Day Zepeda returns to therapy after being placed on 30 day hold after taking a break from formal therapy to assess progress with HEP   Despite reporting adherence to HEP, since last progress note, Day Zepeda has demonstrated regression in most outcome measures without having formal therapy  She has decreased most significantly with self-selected gait speed and fast walking speed  With FWW, gait speed has decreased from 0 77m/s to 0 52m/s, and with ambulation with SPC, self-selected gait speed has decreased from 0 64m/s to 0 39m/s (MCID value of 0 1m/s)  6 minute walk test for measuring cardiovascular endurance has declined from 700 feet with FWW to 500 feet with FWW (MCID value of 164 feet)  Kevin Corado is demonstrating increased time to complete 5xSTS, previously able to complete in 18 seconds, currently between 20 4-27 2 seconds with UE use (MCID value of 3 seconds)  FGA declined from 15/30 to 11/30 (required use of cane during entire test)  TUG time was previously 16 1 seconds with use of SPC, now is 21 4 seconds with use of FWW  Kevin Corado also reports increased pain and tone in her L toes and ankle, limiting standing and walking function as well as limiting her ability to functionally move the L LE for activities such as dressing and bed mobility  Kevin Corado continues to be at increased risk of falls from decreased gait speed (below 1 0m/s), increased time to complete TUG (cut-off score of 13 5 seconds), increased time to complete 5xSTS (cut-off score of 15 seconds), and FGA score below cut-off of 22/30  Her gait speed has declined to below cut-off of 0 6m/s, making her more likely to be hospitalized, more likely to require assistance with ADL's and IADL's, and is considered a limited community ambulator  Her decreased endurance during 6MWT of 500 feet with FWW also limits her ability to perform community ambulation tasks  Kevin Corado would benefit from resuming formal restorative therapy due to decline in function since being placed on 30 day hold   She has also had recent referral placed for AFO that may assist with tone management, decreased pain, and DF assist, and would benefit from additional therapy for trial of AFO, as well as education on wearing schedule  She would benefit from therapy to decrease risk of falls, improve balance, improve LE strength and endurance, tone management, improve independence and decrease caregiver burden, improve functional mobility, maximize function, and improve QOL           Assessment details: (Initial Evaluation)  Patient presents to outpatient physical therapy s/p PATSY on 8/12/22, post inpatient rehabilitation and SNF stay  She presents with impaired L LE strength,  Increased L LE tone and spasticity, decreased endurance, decreased balance, increased fall risk, and requiring assistance for mobility tasks (gait, transfers, bed mobility)  She presents with increased fall risk from increased time to complete 5xSTS of 27 6 seconds, above cut-off score of 15 seconds  She also presents with decreased gait speed of 0 30 m/s, below cut-off score of 1 0m/s for increased risk of falls, and also is below cut-off score for needing assist with ADL's and IADLS (6 3L/Y cut-off score), is more likely to be hospitalized (0 6m/s cut-off), and is considered a household walker  She is also considered a fall risk with TUG score of 54 4 seconds, above cut-off score of 13 5 seconds for fall risk  She demonstrates decreased LE strength and endurance from 5xSTS of 27 65 seconds (with min A due to retropulsion)  She also demonstrates decreased functional mobility and endurance with 2MWT of 110 feet, and able to ambulate 130 feet in total before requiring rest break  She would benefit from skilled physical therapy to decrease fall risk, improve balance, improve LE strength and endurance, improve cardiovascular endurance, improve functional mobility, decreased caregiver burden and maximize function    Impairments: abnormal gait, abnormal muscle tone, abnormal or restricted ROM, activity intolerance, impaired balance, impaired physical strength, pain with function and safety issue  Understanding of Dx/Px/POC: good   Prognosis: good          Plan  2x/week for 8 weeks  POC starting date: 6/15/23  POC ending date: 8/15/23     NEW GOALS (23)  ST  Pt will improve self-selected gait speed to at least 0 62m/s with least restrictive AD within 4 weeks to improve gait and decrease fall risk MET WITH FWW during 6MWT  2  Pt will improve TUG time to at least 18 seconds with LRAD within 4 weeks to decrease risk of falls and improve mobility NOT MET  3  Pt will improve 6MWT to at least 600 feet in 4 weeks with FWW to improve endurance and functional mobility MET     LT  Pt will improve fast gait speed to at least 0 75m/s with LRAD within 8 weeks to improve gait and decrease fall risk PROGRESSING  2  Pt will improve TUG time to at least 15 seconds with LRAD within 8 weeks to decrease risk of falls and improve mobility  3  Pt will improve 6MWT to at least 700 feet in 8 weeks with FWW to improve endurance and functional mobility  4  Pt will improve 5xSTS to at least 17 seconds (with UE's) to improve LE strength and endurance PROGRESSING  5  Pt will improve FGA score to at least 16/30 in 8 weeks to improve balance and decrease risk of falls  6  Pt will be independent with HEP in 8 weeks          Subjective Evaluation     History of Present Illness    6/15/23:     23: States that the pain in her toes is not as bad since resuming therapy, but she continues to have pain in the toes  She is waiting for a brace (AFO), should be getting in within a week  She reports she doesn't like to use a cane and needs to use the walker  She reports that she gets very tired when walking and needs to take breaks when cleaning in her house  She states she is moving more since restarting therapy, continues to have difficulty with getting up from sitting down       23: Shoulder is feeling better  Still having a lot of pain at her toes, doesn't have an appointment with neurology until August  She states that she is doing her exercises at home   Has a caregiver from -, then has to go to her son's house to sleep and for the morning  She states that she uses the FWW for ambulation due to not liking the cane and not feeling balanced when using it  She states that things are getting more difficult since stopping therapy, she needs help getting up from low surfaces at times  She reports she tries to do things on her own first because she knows she needs to, but sometimes needs help  Per medical chart, referral was made for ankle brace (AFO) due to foot drop        Mechanism of injury: She usually lives alone, now her son is off from work to help her  She states she needs help to move from the bed  She states that she is walking with a walker at home  She states she sometimes uses the wheelchair in the house, but mainly when leaving the house  She has been staying at her home, but went to her son's house yesterday (bathroom 2nd floor)  She states she has difficulty with going down the stairs  She states she has pain in the knee down and the foot feels heavy and asleep on the L side  She states she wants to walk better with the walker  22 with contracted and weak LUE, LLE weakness, L facial droop, mild dysarthria and left sided sensory deficits  CTA showed near occlusive thormbus at R MCA bifurcation, as well as mild beading of the mid to distal ICAs suspicious for mild fibromuscular dysplasia, and mild atherosclerotic disease at bilateral distal carotid arteries  [facility] discharge on 22, [facility] to [facility] discharged last week         Pain  Current pain ratin  At best pain ratin  At worst pain ratin  Location: L LE  Quality: cramping  Relieving factors: rest     Social Support  Steps to enter house: no  Stairs in house: no   Lives in: apartment  Lives with: alone     Objective        Manual Muscle Testing - Hip Left Right   Flexion 2+ 4+   Extension NT NT   Abduction NT NT   23: L LE Flexion 3/5, Abduction 2+/5, 23: L LE hip flexion 3+/4, abduction 2+/5, adduction 3/5     Manual Muscle Testing - Knee Left Right   Flexion 3+ 4+   Extension 3+ 4+   23: L LE flexion 4-, extension 4-  23: L LE flexion 4-/5, extension 4-/5     Manual Muscle Testing - Ankle Left Right   Doriflexion 2- 4+   Plantarflexion NT NT   23: L DF 1/5        Coordination Left Right   Alt toe tapping unable Not tested         Sensation Left Right   Kinesthesia NT NT   Light Touch intact intact         Muscle Tone (Modified Sada Scale**) Left Right   Hamstring 2 0   Gastroc 3 0   Quad 2 0   **  0 = no increase in muscle tone  1 = slight increase in muscle tone, minimal resistance at the end of ROM when moved  1+ = minimal resistance throughout the remainder (less than half) of ROM when moved  2 =  David increase in muscle tone through most of the ROM, but still moved easily  3 = considerable increase in muscle tone, movement difficult  4 = affects parts rigid         Balance Test 3/6 4/11 5/18 6/15   5x Sit to Stand: 18 0 seconds with UE's and SPV 27 22 seconds with UE's 1st trial     20 40 seconds 2nd trial with UE's 21 06 seconds with UE's      18 75 2nd trial with BUE's    TU 16 sec with SPC and SPV 21 44 seconds with FWW 20 65 seconds with FWW    Gait Speed:  0 64m/s with SPC SSGS     SSGS  0 69m/s no AD     0 77m/s during 6MWT fast gait speed with FWW 0 39m/s with SPC SSGS              0 52m/s with FWW during 6MWT (fast) SSGS 0 46m/s with SPC SSGS     0 47m/s SSGS with FWW     0 71m/s with FWW during 6MWT fast speed    6 Minute Walk Test: 660 feet without AD (gait belt) in 6 minutes     700 feet with FWW (at end of session)       500 feet with  feet with FWW     FGA:  15/30, able to perform without AD  With [facility]  with SPC    3MBWT No AD, 15 8 seconds With SPC, 25 8 seconds With SPC, 2nd trial 19 9 seconds             Gait Assessment: Decreased gait speed, decreased step length L>R, decreased heel "strike bilaterally  Slightly flexed posture of L LE during swing and initial contact, 1 instance of catching R toes during swing phase with SPC  Precautions: HTN, a-fib, pacemaker        Manuals 6/1 6/5 6/7  5/12 5/15 5/18 5/23                                               Neuro Re-Ed       FGA 9/30  TUG x 2 reps    Standing marching           sidesteps SOLO 1/4 x 3 laps #3 LAW // bars (short) with 3# AW on L LE, x 4 laps SOLO  1/4 lap x 3 laps  No AW     SOLO  1/4 x 3 laps   HKM SOLO 1/4 x3 laps #3 LAW // bars (short) with 3# AW on L LE x 4 laps SOLO  1/2 lap no AW x 2 laps   SOLO  1/4 lap x 3 laps  SOLO  1/4 x 3 laps   Sit to stand with LE elevated     4\" step x 10 reps with BUE's and cues for upright posture      Step taps  6-inch step with 3# AW on L LE x 10 each   6-inch block x 12 reps each, single UE support   6-inch step x 8 reps each, 3# AW on L LEf   Resisted walking SOLO purple TB through harness   1/4 lap 2x2 laps #3 LAW  SOLO  Purple TB around waist  1/4 lap x 3 laps with 3# AW on L LE    Sidesteps purple TB 1/2 lap x 2 laps (minimal resistance moving to R side)    SOLO  Blue TB around waist 1/4 lap x 2 laps  SOLO  Blue TB around waist 1/4 lap x 2 laps with 3# AW on L LE   backwards  SOLO  3# AW on L LE 1/4 lap x 2 laps   // bars (short) x 4 laps UE support on therapist and cues for upright posture SOLO  1/4 lap x 4 laps In hallway 30' x 2 reps    hurdles     Refused due to pain      Step up      SOLO  6-inch step  X 10 reps up and over leading up with R LE, down with R LE    Step up and back, leading with L LE up, R LE down x 10 reps Staircase x 3 laps with BUE support SOLO   With 3# AW on L LE  L UE support on rail, x 6 laps step over step pattern   Tall kneeling           foam     Stepping on/off 2 foam pads x 3 laps forward, 3 laps laterally, single UE support in /// bars SOLO  Stepping on/off foam pads (4) forward x 3 laps     Bolster kick           Bean bag  from floor     X 10 reps, " then x 10 reps with toss to end of bars, no UE support      Resisted lean           amb avoiding obstacles     // bars (short) with bean bags x 3 laps, no UE support      Ther Ex       6MWT with  feet    5xSTS with BUE's x 2 reps    Sit to stand Sit <> Stand 3x5  BUE on LLE   1 25 6 s   2 22 6 s  3 19 5 s   Sit to stand x 10 reps with BUE's on L LE Sit to stand with L UE support x 10 reps    Seated on airex x 10 reps   Sit to stand x 10 reps with BUE support on L LE  Sit to stand x 15 reps with BUE's on L LE    X 5 reps 17 7 seconds                         Manual stretching   Passive ROM of toes and ankle                                                        Ther Activity           Floor transfer           Bed mobility                      Gait Training           Gait with SPC Hallway 1x laps SPC with #3 LAW       In hallway x 5 laps    With FWW in hallway   In hallway with FWW x 4 laps  In hallway with FWW, fast walking x 3 laps      Gait training on TM SOLO BUE support with #3 LAW     1 2 mph x 10 mins SOLO  BUE support with 3# AW on L,    1 2mph for 10 minutes total      SOLO  BUE support with 3# AW on L LE, 1 2mph x 7 minutes   Amb without AD           staircase           Education   Education on tone management techniques (bracing, weight bearing, botox injections)    Tone management techniques, role of weight bearing with ambulation and tone, progress with therapy, POC, purpose of AFO Tone management with WB and bracing, motor learning with repetitions                                              Access Code: [email or web address]  URL: [email or web address]  Date: 03/06/2023  Prepared by: Bela Viera    Exercises  • Seated Hamstring Stretch with Strap - 1 x daily - 7 x weekly - 3 sets - 30 hold  • Seated Gastroc Stretch with Strap - 1 x daily - 7 x weekly - 3 sets - 30 hold  • Seated Long Arc Quad - 1 x daily - 5 x weekly - 3 sets - 10 reps  • Seated March - 1 x daily - 5 x weekly - 3 sets - 10 reps  • Seated Heel Raise - 1 x daily - 5 x weekly - 3 sets - 10 reps  • Seated Hip Adduction Isometrics with Ball - 1 x daily - 5 x weekly - 3 sets - 10 reps - 5 hold  • Seated Hip Abduction - 1 x daily - 5 x weekly - 3 sets - 10 reps  • Seated Toe Raise - 1 x daily - 7 x weekly - 2 sets - 10 reps  • Supine Ankle Pumps - 1 x daily - 5 x weekly - 2 sets - 10 reps  • Supine Heel Slide - 1 x daily - 5 x weekly - 2 sets - 10 reps  • Supine Bridge - 1 x daily - 3 x weekly - 2 sets - 10 reps  • Standing March with Counter Support - 1 x daily - 3 x weekly - 2 sets - 10 reps  • Side Stepping with Counter Support - 1 x daily - 3 x weekly - 5 sets  • Backward Walking with Counter Support - 1 x daily - 4 x weekly - 5 sets  • Mini Squat with Counter Support - 1 x daily - 4 x weekly - 3 sets - 10 reps  • Sit to Stand with Hands on Knees - 1 x daily - 4 x weekly - 2 sets - 10 reps

## 2023-06-22 ENCOUNTER — OFFICE VISIT (OUTPATIENT)
Dept: PHYSICAL THERAPY | Facility: CLINIC | Age: 83
End: 2023-06-22
Payer: MEDICARE

## 2023-06-22 DIAGNOSIS — Z74.09 IMPAIRED FUNCTIONAL MOBILITY, BALANCE, GAIT, AND ENDURANCE: ICD-10-CM

## 2023-06-22 DIAGNOSIS — Z86.73 HISTORY OF CVA (CEREBROVASCULAR ACCIDENT): Primary | ICD-10-CM

## 2023-06-22 PROCEDURE — 97164 PT RE-EVAL EST PLAN CARE: CPT

## 2023-06-22 PROCEDURE — 97112 NEUROMUSCULAR REEDUCATION: CPT

## 2023-06-22 NOTE — PROGRESS NOTES
Progress Note        Today's date: 2023  Patient name: Wilmer Grant  : 1940  MRN: [id number]  Referring provider: Shelia Rodgers PA-C  Dx:   Encounter Diagnosis     ICD-10-CM    1  History of CVA (cerebrovascular accident)  Z86 73       2  Impaired functional mobility, balance, gait, and endurance  Z74 09           Start Time: 930  Stop Time: 1015  Total time in clinic (min): 45 minutes     Assessment  23: Oli Hdez has attended 5 sessions of physical therapy since last progress note, and 13 sessions since resuming from 30 day hold in April  Attendance has been somewhat limited due to being sick and transportation issues (gap from 23-23)  Since last progress note, Oli Hdez has shown significant improvement with FGA test from  to , and self-selected gait speed with FWW from 0 47m/s to 0 65m/s  She has shown slight improvement in TUG time from 20 6 seconds to 18 4 seconds  She continues to demonstrate difficulty with 5xSTS and backwards walking (3MBWT from 19 9 seconds with SPC to 27 seconds), however may be due to hesitancy using SPC at home and 2 week absence from therapy prior to testing  Oli Hdez has received a carbon fiber AFO to assist with swing phase and tone management  Significant tone in L ankle and toes continue to limit tolerance to therapy at this time, however appear to be improving with increase ambulation, weight bearing, and use of AFO  Oli Hdez continues to demonstrate significant mobility difficulties, increased risk of falls, decreased LE strength and power, and fear of falling  She has received AFO last week and has had limited education on AFO management, wearing schedule, and donning/doffing of AFO   She would benefit from continued physical therapy 1x/week for 4 weeks to provide education on AFO management, wearing schedule, practice donning/doffing, as well as transition to HEP and walking program, decrease fall risk, improve mobility, improve LE strength and endurance, and increase overall endurance  5/18/23: Esdras Loco has attended 5 sessions of physical therapy after resuming therapy post 30 day hold  Esdras Loco is making progress with therapy, with 6MWT improving by 130 feet with FWW since last re-evaluation, improving from 500 feet to 628 feet  5xSTS time improved from best time of 20 4 seconds to 18 7 seconds, she continues to require bilateral UE support to perform  TUG demonstrated slight improvement from 21 4 seconds to 20 6 seconds  Self-selected gait speed improved from 0 39m/s (with SPC) to 0 46m/s, with more significant improvement in fast speed during 6MWT from 0 52m/s with FWW to 0 71m/s  3-meter backwards walking test with SPC improved from 25 8 seconds to 19 9 seconds with significant decrease in hesitancy with backwards walking  FGA did not demonstrate significant change  Esdras Loco is demonstrating improvement in endurance, LE strength and endurance, improved gait speed and gait speed variability since resuming therapy  She is in the process of waiting to receive AFO for L LE, and would benefit from continued physical therapy for gait training, orthotic management and education, education on skin checks and wearing schedule for AFO  She would continue to benefit from skilled physical therapy to improve balance confidence, improve LE strength and endurance, improve cardiovascular endurance and walking tolerance, decrease fall risk, and maximize function  Continue 2x/week for 4 additional weeks  4/11/23: Esdras Loco returns to therapy after being placed on 30 day hold after taking a break from formal therapy to assess progress with HEP  Despite reporting adherence to HEP, since last progress note, Esdras Loco has demonstrated regression in most outcome measures without having formal therapy  She has decreased most significantly with self-selected gait speed and fast walking speed   With FWW, gait speed has decreased from 0 77m/s to 0 52m/s, and with ambulation with SPC, self-selected gait speed has decreased from 0 64m/s to 0 39m/s (MCID value of 0 1m/s)  6 minute walk test for measuring cardiovascular endurance has declined from 700 feet with FWW to 500 feet with FWW (MCID value of 164 feet)  Hillary Demarco is demonstrating increased time to complete 5xSTS, previously able to complete in 18 seconds, currently between 20 4-27 2 seconds with UE use (MCID value of 3 seconds)  FGA declined from 15/30 to 11/30 (required use of cane during entire test)  TUG time was previously 16 1 seconds with use of SPC, now is 21 4 seconds with use of FWW  Hillary Demarco also reports increased pain and tone in her L toes and ankle, limiting standing and walking function as well as limiting her ability to functionally move the L LE for activities such as dressing and bed mobility  Hillary Demarco continues to be at increased risk of falls from decreased gait speed (below 1 0m/s), increased time to complete TUG (cut-off score of 13 5 seconds), increased time to complete 5xSTS (cut-off score of 15 seconds), and FGA score below cut-off of 22/30  Her gait speed has declined to below cut-off of 0 6m/s, making her more likely to be hospitalized, more likely to require assistance with ADL's and IADL's, and is considered a limited community ambulator  Her decreased endurance during 6MWT of 500 feet with FWW also limits her ability to perform community ambulation tasks  Hillary Demarco would benefit from resuming formal restorative therapy due to decline in function since being placed on 30 day hold  She has also had recent referral placed for AFO that may assist with tone management, decreased pain, and DF assist, and would benefit from additional therapy for trial of AFO, as well as education on wearing schedule   She would benefit from therapy to decrease risk of falls, improve balance, improve LE strength and endurance, tone management, improve independence and decrease caregiver burden, improve functional mobility, maximize function, and improve QOL  Assessment details: (Initial Evaluation)  Patient presents to outpatient physical therapy s/p CVA on 22, post inpatient rehabilitation and SNF stay  She presents with impaired L LE strength,  Increased L LE tone and spasticity, decreased endurance, decreased balance, increased fall risk, and requiring assistance for mobility tasks (gait, transfers, bed mobility)  She presents with increased fall risk from increased time to complete 5xSTS of 27 6 seconds, above cut-off score of 15 seconds  She also presents with decreased gait speed of 0 30 m/s, below cut-off score of 1 0m/s for increased risk of falls, and also is below cut-off score for needing assist with ADL's and IADLS (5 2T/O cut-off score), is more likely to be hospitalized (0 6m/s cut-off), and is considered a household walker  She is also considered a fall risk with TUG score of 54 4 seconds, above cut-off score of 13 5 seconds for fall risk  She demonstrates decreased LE strength and endurance from 5xSTS of 27 65 seconds (with min A due to retropulsion)  She also demonstrates decreased functional mobility and endurance with 2MWT of 110 feet, and able to ambulate 130 feet in total before requiring rest break  She would benefit from skilled physical therapy to decrease fall risk, improve balance, improve LE strength and endurance, improve cardiovascular endurance, improve functional mobility, decreased caregiver burden and maximize function  Impairments: abnormal gait, abnormal muscle tone, abnormal or restricted ROM, activity intolerance, impaired balance, impaired physical strength, pain with function and safety issue  Understanding of Dx/Px/POC: good   Prognosis: good        Plan  1x/week for 4 weeks  POC starting date: 2023  POC ending date: 2023    GOALS (23)  ST   Pt will improve self-selected gait speed to at least 0 62m/s with least restrictive AD within 4 weeks to improve gait and decrease fall risk MET WITH FWW during 6MWT  2  Pt will improve TUG time to at least 18 seconds with LRAD within 4 weeks to decrease risk of falls and improve mobility NOT MET  3  Pt will improve 6MWT to at least 600 feet in 4 weeks with FWW to improve endurance and functional mobility MET    LT  Pt will improve fast gait speed to at least 0 75m/s with LRAD within 8 weeks to improve gait and decrease fall risk NOT MET  2  Pt will improve TUG time to at least 15 seconds with LRAD within 8 weeks to decrease risk of falls and improve mobility NOT MET  3  Pt will improve 6MWT to at least 700 feet in 8 weeks with FWW to improve endurance and functional mobility NOT MET  4  Pt will improve 5xSTS to at least 17 seconds (with UE's) to improve LE strength and endurance NOT MET  5  Pt will improve FGA score to at least 16/30 in 8 weeks to improve balance and decrease risk of falls PROGRESSING  6  Pt will be independent with HEP in 8 weeks PROGRESSING    NEW GOALS (23)  1  Pt will improve fast gait speed (with FWW) to at least 0 77m/s in 4 weeks to return to PLOF  2  Pt will improve 6MWT to at least 700 feet with FWW and AFO donned to improve functional mobility in 4 weeks  3  Pt will be independent with HEP and walking program in 4 weeks  4  Pt will be able to instruct assistant in proper donning/doffing of AFO and wearing schedule in 4 weeks      Subjective Evaluation    History of Present Illness  23: Becky Willis reports that she got the brace last week  She reports she was sick last week and not able to attend therapy  She reports she does not feel comfortable walking with a cane and has not gotten one to try to use at home  She reports that her L leg gets swollen sometimes and she continues to have pain at her toes, the brace helps a little  23: States that the pain in her toes is not as bad since resuming therapy, but she continues to have pain in the toes   She is waiting for a brace (AFO), should be getting in within a week  She reports she doesn't like to use a cane and needs to use the walker  She reports that she gets very tired when walking and needs to take breaks when cleaning in her house  She states she is moving more since restarting therapy, continues to have difficulty with getting up from sitting down  4/11/23: Shoulder is feeling better  Still having a lot of pain at her toes, doesn't have an appointment with neurology until August  She states that she is doing her exercises at home  Has a caregiver from 2-11, then has to go to her son's house to sleep and for the morning  She states that she uses the FWW for ambulation due to not liking the cane and not feeling balanced when using it  She states that things are getting more difficult since stopping therapy, she needs help getting up from low surfaces at times  She reports she tries to do things on her own first because she knows she needs to, but sometimes needs help  Per medical chart, referral was made for ankle brace (AFO) due to foot drop      Mechanism of injury: She usually lives alone, now her son is off from work to help her  She states she needs help to move from the bed  She states that she is walking with a walker at home  She states she sometimes uses the wheelchair in the house, but mainly when leaving the house  She has been staying at her home, but went to her son's house yesterday (bathroom 2nd floor)  She states she has difficulty with going down the stairs  She states she has pain in the knee down and the foot feels heavy and asleep on the L side  She states she wants to walk better with the walker  8/12/22 with contracted and weak LUE, LLE weakness, L facial droop, mild dysarthria and left sided sensory deficits  CTA showed near occlusive thormbus at R MCA bifurcation, as well as mild beading of the mid to distal ICAs suspicious for mild fibromuscular dysplasia, and mild atherosclerotic disease at bilateral distal carotid arteries   [facility] discharge on 22, discharged to SNF [facility] discharged last week         Pain  Current pain ratin  At best pain ratin  At worst pain ratin  Location: L LE  Quality: cramping  Relieving factors: rest    Social Support  Steps to enter house: no  Stairs in house: no   Lives in: apartment  Lives with: alone    Treatments  Discharged from (in last 30 days): skilled nursing facility  Patient Goals  Patient goal: walk with the walker and get better        Objective       Manual Muscle Testing - Hip Left Right   Flexion 2+ 4+   Extension NT NT   Abduction NT NT   23: L LE Flexion 3/5, Abduction 2+/5,   23: L LE hip flexion 3+/4, abduction 2+/5, adduction 3/5    Manual Muscle Testing - Knee Left Right   Flexion 3+ 4+   Extension 3+ 4+   23: L LE flexion 4-, extension 4-  23: L LE flexion 4-/5, extension 4-/5    Manual Muscle Testing - Ankle Left Right   Doriflexion 2- 4+   Plantarflexion NT NT   23: L DF 1/5      Coordination Left Right   Alt toe tapping unable Not tested       Sensation Left Right   Kinesthesia NT NT   Light Touch intact intact       Muscle Tone (Modified Sada Scale**) Left Right   Hamstring 2 0   Gastroc 3 0   Quad 2 0   **  0 = no increase in muscle tone  1 = slight increase in muscle tone, minimal resistance at the end of ROM when moved  1+ = minimal resistance throughout the remainder (less than half) of ROM when moved  2 =  David increase in muscle tone through most of the ROM, but still moved easily  3 = considerable increase in muscle tone, movement difficult  4 = affects parts rigid       Balance Test 3/6 4/11 5/18 6/22   5x Sit to Stand: 18 0 seconds with UE's and SPV 27 22 seconds with UE's 1st trial    20 40 seconds 2nd trial with UE's 21 06 seconds with UE's     18 75 2nd trial with BUE's 19 2 seconds with BUE's   TU 16 sec with SPC and SPV 21 44 seconds with FWW 20 65 seconds with FWW 21 44 seconds with FWW and L AFO    18 47 2nd trial with FWW   Gait Speed:  0 64m/s with SPC SSGS    SSGS  0 69m/s no AD    0 77m/s during 6MWT fast gait speed with FWW 0 39m/s with SPC SSGS          0 52m/s with FWW during 6MWT (fast) SSGS 0 46m/s with SPC SSGS    0 47m/s SSGS with FWW    0 71m/s with FWW during 6MWT fast speed 0 42m/s with SPC SSGS    0 65m/s with L AFO and FWW    No significant change with fast gait speed with FWW   6 Minute Walk Test: 660 feet without AD (gait belt) in 6 minutes    700 feet with FWW (at end of session)     500 feet with  feet with FWW  630 feet with FWW   FGA:  15/30, able to perform without AD 11/30 With [facility] 9/30 with SPC 12/30 with [facility]   3MBWT No AD, 15 8 seconds With SPC, 25 8 seconds With SPC, 2nd trial 19 9 seconds 27 seconds with SPC         Gait Assessment: Decreased gait speed, decreased step length L>R, decreased heel strike bilaterally  Slightly flexed posture of L LE during swing and initial contact, 1 instance of catching R toes during swing phase with SPC  Precautions: HTN, a-fib, pacemaker    Manuals 6/1 6/5 6/7 6/22 5/18 5/23                                           Neuro Re-Ed    FGA 12/30  TUG x 2 reps  FGA 9/30  TUG x 2 reps    Standing marching          sidesteps SOLO 1/4 x 3 laps #3 LAW // bars (short) with 3# AW on L LE, x 4 laps SOLO  1/4 lap x 3 laps  No AW    SOLO  1/4 x 3 laps   HKM SOLO 1/4 x3 laps #3 LAW // bars (short) with 3# AW on L LE x 4 laps SOLO  1/2 lap no AW x 2 laps    SOLO  1/4 x 3 laps   Sit to stand with LE elevated          Step taps  6-inch step with 3# AW on L LE x 10 each     6-inch step x 8 reps each, 3# AW on L LEf   Resisted walking SOLO purple TB through harness   1/4 lap 2x2 laps #3 LAW  SOLO  Purple TB around waist  1/4 lap x 3 laps with 3# AW on L LE    Sidesteps purple TB 1/2 lap x 2 laps (minimal resistance moving to R side)     SOLO  Blue TB around waist 1/4 lap x 2 laps with 3# AW on L LE   backwards  SOLO  3# AW on L LE 1/4 lap x 2 laps  With SPC, 2 reps of 20 feet  In hallway 30' x 2 reps    hurdles          Step up    Staircase x 2 laps with BUE support  Staircase x 3 laps with BUE support SOLO   With 3# AW on L LE  L UE support on rail, x 6 laps step over step pattern   Tall kneeling          foam          Bolster kick          Bean bag  from floor          Resisted lean          amb avoiding obstacles          Ther Ex    6MWT with  feet    5xSTS with BUE's x 2 reps  6MWT with  feet    5xSTS with BUE's x 2 reps    Sit to stand Sit <> Stand 3x5  BUE on LLE   1 25 6 s   2 22 6 s  3 19 5 s   Sit to stand x 10 reps with BUE's on L LE Sit to stand with L UE support x 10 reps    Seated on airex x 10 reps    Sit to stand x 15 reps with BUE's on L LE    X 5 reps 17 7 seconds                       Manual stretching   Passive ROM of toes and ankle                                                   Ther Activity          Floor transfer          Bed mobility                    Gait Training          Gait with SPC Hallway 1x laps SPC with #3 LAW      In hallway x 5 laps    With FWW in hallway   In hallway with FWW x 4 laps       Gait training on TM SOLO BUE support with #3 LAW     1 2 mph x 10 mins SOLO  BUE support with 3# AW on L,    1 2mph for 10 minutes total     SOLO  BUE support with 3# AW on L LE, 1 2mph x 7 minutes   Amb without AD          staircase          Education   Education on tone management techniques (bracing, weight bearing, botox injections) Tone management, donning and doffing AFO, wearing patterns, progress with therapy, importance of weight bearing and walking for tone management, purpose of AFO, importance of skin checks and hot spots  Tone management techniques, role of weight bearing with ambulation and tone, progress with therapy, POC, purpose of AFO Tone management with WB and bracing, motor learning with repetitions                                           Access Code: [email or web address]  URL: [email or web address]  Date: 03/06/2023  Prepared by: Jolynn Strong  • Seated Hamstring Stretch with Strap - 1 x daily - 7 x weekly - 3 sets - 30 hold  • Seated Gastroc Stretch with Strap - 1 x daily - 7 x weekly - 3 sets - 30 hold  • Seated Long Arc Quad - 1 x daily - 5 x weekly - 3 sets - 10 reps  • Seated March - 1 x daily - 5 x weekly - 3 sets - 10 reps  • Seated Heel Raise - 1 x daily - 5 x weekly - 3 sets - 10 reps  • Seated Hip Adduction Isometrics with Ball - 1 x daily - 5 x weekly - 3 sets - 10 reps - 5 hold  • Seated Hip Abduction - 1 x daily - 5 x weekly - 3 sets - 10 reps  • Seated Toe Raise - 1 x daily - 7 x weekly - 2 sets - 10 reps  • Supine Ankle Pumps - 1 x daily - 5 x weekly - 2 sets - 10 reps  • Supine Heel Slide - 1 x daily - 5 x weekly - 2 sets - 10 reps  • Supine Bridge - 1 x daily - 3 x weekly - 2 sets - 10 reps  • Standing March with Counter Support - 1 x daily - 3 x weekly - 2 sets - 10 reps  • Side Stepping with Counter Support - 1 x daily - 3 x weekly - 5 sets  • Backward Walking with Counter Support - 1 x daily - 4 x weekly - 5 sets  • Mini Squat with Counter Support - 1 x daily - 4 x weekly - 3 sets - 10 reps  • Sit to Stand with Hands on Knees - 1 x daily - 4 x weekly - 2 sets - 10 reps

## 2023-06-23 ENCOUNTER — TELEPHONE (OUTPATIENT)
Dept: INTERNAL MEDICINE CLINIC | Facility: CLINIC | Age: 83
End: 2023-06-23

## 2023-06-23 NOTE — TELEPHONE ENCOUNTER
Folder Color- Blue     Name of Form-  [facility] Confirmation of Order     Form to be filled out by- Dr Lotus Lyle    Form to be Faxed  [phone]

## 2023-06-26 ENCOUNTER — OFFICE VISIT (OUTPATIENT)
Dept: PHYSICAL THERAPY | Facility: CLINIC | Age: 83
End: 2023-06-26
Payer: MEDICARE

## 2023-06-26 DIAGNOSIS — Z86.73 HISTORY OF CVA (CEREBROVASCULAR ACCIDENT): Primary | ICD-10-CM

## 2023-06-26 DIAGNOSIS — Z74.09 IMPAIRED FUNCTIONAL MOBILITY, BALANCE, GAIT, AND ENDURANCE: ICD-10-CM

## 2023-06-26 PROCEDURE — 97110 THERAPEUTIC EXERCISES: CPT

## 2023-06-26 PROCEDURE — 97530 THERAPEUTIC ACTIVITIES: CPT

## 2023-06-26 NOTE — PROGRESS NOTES
Daily Note     Today's date: 2023  Patient name: Don Mckeon  : 1940  MRN: [id number]  Referring provider: Hu Obregon PA-C  Dx:   Encounter Diagnosis     ICD-10-CM    1  History of CVA (cerebrovascular accident)  Z86 73       2  Impaired functional mobility, balance, gait, and endurance  Z74 09                      Subjective: Patient reports that she is having a hard time getting her foot into the shoe with the brace  States that her heel keeps coming out of the shoe and she can't walk with it  Comes to therapy without her brace and different slip on shoes today  She reports she wants today to be her last session, it is hard on her son  Objective: See treatment diary below      Assessment: Tolerated treatment fair  Patient demonstrated fatigue post treatment, exhibited good technique with therapeutic exercises and would benefit from continued PT  Session focused on education on AFO management, donning and doffing, proper fit, skin checks, and education on wearing AFO  Demonstrated proper fitting of AFO with off the shelf brace during session since patient did not bring her AFO with her to session  Discussed importance of tone management techniques and stretching prior to putting on brace due to PF tone that may be limiting getting her heel into shoe  Plan: Continue per plan of care  Precautions: HTN, a-fib, pacemaker        Manuals                                    Neuro Re-Ed    FGA   TUG x 2 reps    Standing marching        sidesteps SOLO 1/4 x 3 laps #3 LAW // bars (short) with 3# AW on L LE, x 4 laps SOLO  1/4 lap x 3 laps  No AW     HKM SOLO /4 x3 laps #3 LAW // bars (short) with 3# AW on L LE x 4 laps SOLO  1/2 lap no AW x 2 laps     Sit to stand with LE elevated        Step taps  6-inch step with 3# AW on L LE x 10 each      Resisted walking SOLO purple TB through harness   1/4 lap 2x2 laps #3 LAW  SOLO  Purple TB around waist  1/4 lap x 3 laps with 3# AW on L LE    Sidesteps purple TB 1/2 lap x 2 laps (minimal resistance moving to R side)      backwards  SOLO  3# AW on L LE 1/4 lap x 2 laps  With SPC, 2 reps of 20 feet    hurdles        Step up    Staircase x 2 laps with BUE support    Tall kneeling        foam        Bolster kick        Bean bag  from floor        Resisted lean        Standing ball toss     Standing without UE support, L AFO, 2 x 30 reps   amb avoiding obstacles        Ther Ex    6MWT with  feet    5xSTS with BUE's x 2 reps    Sit to stand Sit <> Stand 3x5  BUE on LLE   1 25 6 s   2 22 6 s  3 19 5 s   Sit to stand x 10 reps with BUE's on L LE Sit to stand with L UE support x 10 reps    Seated on airex x 10 reps  Sit to stand x 10 reps with single UE                   Manual stretching   Passive ROM of toes and ankle  gastroc and toes manually    gastroc/hamstring stretch with sheet x 3 minutes                                       Ther Activity        Floor transfer        Bed mobility        AFO     Donning/doffing AFO, techniques to decrease tone, tone management, stretching prior to donning   Gait Training        Gait with SPC Hallway 1x laps SPC with #3 LAW        With FWW in hallway   In hallway with FWW x 4 laps     Gait training on TM SOLO BUE support with #3 LAW     1 2 mph x 10 mins SOLO  BUE support with 3# AW on L,    1 2mph for 10 minutes total      Amb without AD        staircase        Education   Education on tone management techniques (bracing, weight bearing, botox injections) Tone management, donning and doffing AFO, wearing patterns, progress with therapy, importance of weight bearing and walking for tone management, purpose of AFO, importance of skin checks and hot spots HEP printout and review                                 Access Code: [email or web address]  URL: [email or web address]  Date: 06/26/2023  Prepared by: Carisa Ramos    Exercises  - Seated Hamstring Stretch with Strap  - 1 x daily - 7 x weekly - 3 sets - 30 hold  - Seated Gastroc Stretch with Strap  - 1 x daily - 7 x weekly - 3 sets - 30 hold  - Seated Long Arc Quad  - 1 x daily - 5 x weekly - 3 sets - 10 reps  - Seated March  - 1 x daily - 5 x weekly - 3 sets - 10 reps  - Seated Heel Raise  - 1 x daily - 5 x weekly - 3 sets - 10 reps  - Seated Hip Adduction Isometrics with Ball  - 1 x daily - 5 x weekly - 3 sets - 10 reps - 5 hold  - Seated Hip Abduction  - 1 x daily - 5 x weekly - 3 sets - 10 reps  - Seated Toe Raise  - 1 x daily - 7 x weekly - 2 sets - 10 reps  - Supine Ankle Pumps  - 1 x daily - 5 x weekly - 2 sets - 10 reps  - Supine Heel Slide  - 1 x daily - 5 x weekly - 2 sets - 10 reps  - Supine Bridge  - 1 x daily - 3 x weekly - 2 sets - 10 reps  - Standing March with Counter Support  - 1 x daily - 3 x weekly - 2 sets - 10 reps  - Side Stepping with Counter Support  - 1 x daily - 3 x weekly - 5 sets  - Backward Walking with Counter Support  - 1 x daily - 4 x weekly - 5 sets  - Mini Squat with Counter Support  - 1 x daily - 4 x weekly - 3 sets - 10 reps  - Sit to Stand with Hands on Knees  - 1 x daily - 4 x weekly - 2 sets - 10 reps

## 2023-06-30 ENCOUNTER — APPOINTMENT (OUTPATIENT)
Dept: PHYSICAL THERAPY | Facility: CLINIC | Age: 83
End: 2023-06-30
Payer: MEDICARE

## 2023-07-05 ENCOUNTER — TELEPHONE (OUTPATIENT)
Dept: INTERNAL MEDICINE CLINIC | Facility: CLINIC | Age: 83
End: 2023-07-05

## 2023-07-05 NOTE — TELEPHONE ENCOUNTER
Patient is requesting a referral to have a mammogram. She called [facility] and they said she needs the referral. Patient request a call to let her know when the referral is in so she can call [facility] to make the appointment.

## 2023-07-10 DIAGNOSIS — Z12.31 SCREENING MAMMOGRAM FOR BREAST CANCER: Primary | ICD-10-CM

## 2023-07-11 DIAGNOSIS — E53.8 B12 DEFICIENCY: Chronic | ICD-10-CM

## 2023-07-13 ENCOUNTER — CLINICAL SUPPORT (OUTPATIENT)
Dept: INTERNAL MEDICINE CLINIC | Facility: CLINIC | Age: 83
End: 2023-07-13

## 2023-07-13 DIAGNOSIS — E53.8 VITAMIN B12 DEFICIENCY: Primary | ICD-10-CM

## 2023-07-13 RX ORDER — CYANOCOBALAMIN 1000 UG/ML
1000 INJECTION, SOLUTION INTRAMUSCULAR; SUBCUTANEOUS
Qty: 3 ML | Refills: 0 | Status: SHIPPED | OUTPATIENT
Start: 2023-07-13 | End: 2023-10-11

## 2023-07-13 NOTE — TELEPHONE ENCOUNTER
Patient called for the status  - she has a [facility] appointment 7/13/23 2pm for the B12 injection at 2pm.    Please check into this. If the medication is not in, call the patient to reschedule the appointment.     thanks

## 2023-07-13 NOTE — PROGRESS NOTES
Patient in office today for Vit B12 injection. Patient supplied the serum and 1.0 ml given in left deltoid.

## 2023-08-03 ENCOUNTER — OFFICE VISIT (OUTPATIENT)
Dept: CARDIOLOGY CLINIC | Facility: CLINIC | Age: 83
End: 2023-08-03
Payer: MEDICARE

## 2023-08-03 VITALS
DIASTOLIC BLOOD PRESSURE: 62 MMHG | BODY MASS INDEX: 25.43 KG/M2 | HEIGHT: 63 IN | SYSTOLIC BLOOD PRESSURE: 144 MMHG | WEIGHT: 143.5 LBS | HEART RATE: 74 BPM

## 2023-08-03 DIAGNOSIS — I27.20 PULMONARY HYPERTENSION (HCC): ICD-10-CM

## 2023-08-03 DIAGNOSIS — I70.1 LEFT RENAL ARTERY STENOSIS (HCC): ICD-10-CM

## 2023-08-03 DIAGNOSIS — I48.20 CHRONIC ATRIAL FIBRILLATION (HCC): Primary | ICD-10-CM

## 2023-08-03 PROCEDURE — 99214 OFFICE O/P EST MOD 30 MIN: CPT | Performed by: INTERNAL MEDICINE

## 2023-08-03 PROCEDURE — 93000 ELECTROCARDIOGRAM COMPLETE: CPT | Performed by: INTERNAL MEDICINE

## 2023-08-03 RX ORDER — POTASSIUM CHLORIDE 750 MG/1
20 TABLET, EXTENDED RELEASE ORAL DAILY
Qty: 180 TABLET | Refills: 3 | Status: SHIPPED | OUTPATIENT
Start: 2023-08-03

## 2023-08-03 NOTE — PROGRESS NOTES
HEART AND VASCULAR  CARDIAC [address]    F/u afib  Today's Date: 08/03/23        Patient name: Vi Tucker  YOB: 1940  Sex: female         Chief Complaint:  Afib,      ASSESSMENT:  Problem List Items Addressed This Visit        Cardiovascular and Mediastinum    Chronic atrial fibrillation ([address]) - Primary    Relevant Orders    POCT ECG    Echo complete w/ contrast if indicated    Left renal artery stenosis (HCC)    Pulmonary hypertension (HCC)    Relevant Medications    potassium chloride (K-DUR,KLOR-CON) 10 mEq tablet    Other Relevant Orders    Echo complete w/ contrast if indicated    Comprehensive metabolic panel    CBC and differential    TSH, 3rd generation with Free T4 reflex    B-Type Natriuretic Peptide(BNP)     79 yo female  1. Permanent afib rates controlled on Pradaxa 150mg bid [id number]  2. Post CVA right MCV with left hemiparesis  Sept 2022- Xarelto failure. Xarelto had been dosed appropriately at 15mg for reduced CrCl<51 also wasn't taking it with food. 3) Severe pulm HTN PASP 65mmHg on echo, mod MR/TR/AI), complains of fatigue, SOB. +JVD on exam  4. 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 She had left sided erosion remotely and lead extracted. Paces up to 76 % since they increased bystolic. EF  normal    5. Cath for borderline stress test 50% RCA and nonobstructive- she had Imdur started, bystolic doubled to 5mg bid and rosumvastatin added. 6. 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 CKD CR 1.0 on last BMP  8. Phillip edema, maybe from pulm htn and verpamil, has been worse on left sided where hemiparetic  9. SOB workup last year, cath was noobstructive, echo nl EF and mild pulm edema. Likely from PULM HTN  10. Mild Anemia    DISCUSSION AND PLAN:    1. Increase lasix  40mg bid and potassium 20meq resume for volume overloaded, Pulm HTN  2. Repeat TTE  3. Refer to Dr Eden aGstelum (Speaks Irish, CHF specialist)  4. Cont pradaxa  5. Check labs, CMP, BNP, CBC, TSH    Orders Placed This Encounter   Procedures   • Comprehensive metabolic panel   • CBC and differential   • TSH, 3rd generation with Free T4 reflex   • B-Type Natriuretic Peptide(BNP)   • POCT ECG   • Echo complete w/ contrast if indicated     Medications Discontinued During This Encounter   Medication Reason   • potassium chloride (K-DUR,KLOR-CON) 10 mEq tablet Reorder       Follow up in: 6 months    . ............................................................................................ HPI:     81 yo female  1. Permanent afib rates controlled on Pradaxa 150mg bid [id number]  2. Post CVA right MCV with left hemiparesis  Sept 2022- Xarelto failure. Xarelto had been dosed appropriately at 15mg for reduced CrCl<51 also wasn't taking it with food. 3) Severe pulm HTN PASP 65mmHg on echo, mod MR/TR/AI), complains of fatigue, SOB. +JVD on exam  4. 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 She had left sided erosion remotely and lead extracted. Paces up to 76 % since they increased bystolic. EF  normal    5. Cath for borderline stress test 50% RCA and nonobstructive- she had Imdur started, bystolic doubled to 5mg bid and rosumvastatin added. 6. 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 CKD CR 1.0 on last BMP  8. Phillip edema, maybe from pulm htn and verpamil, has been worse on left sided where hemiparetic  9. SOB workup last year, cath was noobstructive, echo nl EF and mild pulm edema. Likely from PULM HTN  10. Mild Anemia    Jj Li feels SOB on exertion, edema worse, very tired. She blames her medicine. Past Medical History:   Diagnosis Date   • A-fib ([address])    • Anemia    • Arthritis    • Breast pain, right    • Chest pain on breathing    • Colon polyp    • Cough    • Diverticulosis    • Encounter for screening colonoscopy    • H/O sick sinus syndrome    • Heart murmur    • High cholesterol    • History of atrial fibrillation     Rate ontrolled. On xarelto 15mg (creatinine 50) Followed by Dr. Get Flores with Cardiology    • History of weight loss     Report loose fitting clothes. Weight stable (3lbs difference). Last colo showed small tubular adenoma x2. Breast biopsy last showed fibrocystic changes. TSH wnl. Will check cbc, bmp, spep. • Hx of long term use of blood thinners    • Hypertension    • Irregular heart beat    • RICH (obstructive sleep apnea)    • Pacemaker    • Preop examination    • Shortness of breath    • Viral bronchitis        Allergies   Allergen Reactions   • Other Itching     Bandaids   • Tape [Medical Tape] Itching     I reviewed the Home Medication list and Allergies in the chart.    Scheduled Meds:  Current Outpatient Medications   Medication Sig Dispense Refill   • acetaminophen (TYLENOL) 325 mg tablet Take 2 tablets (650 mg total) by mouth every 6 (six) hours as needed for mild pain  0   • ammonium lactate (LAC-HYDRIN) 12 % cream Apply topically as needed for dry skin 385 g 1   • Blood Pressure Monitoring (Blood Pressure Cuff) MISC Use every other day For HTN 1 each 0   • cyanocobalamin 1,000 mcg/mL INJECT 1 ML (1,000 MCG TOTAL) INTO A MUSCLE EVERY 30 (THIRTY) DAYS 3 mL 0   • furosemide (LASIX) 20 mg tablet Take 2 tablets (40 mg total) by mouth daily 90 tablet 3   • Incontinence Supply Disposable (RA Adult Wipes) MISC Use 4 (four) times a day 64 each 10   • losartan (COZAAR) 50 mg tablet TAKE 1 TABLET (50 MG TOTAL) BY MOUTH DAILY 90 tablet 3   • melatonin 3 mg Take 1 tablet (3 mg total) by mouth every evening 30 tablet 0   • menthol-methyl salicylate (BENGAY) 95-14 % cream Apply topically 2 (two) times a day  0   • nebivolol (BYSTOLIC) 5 mg tablet Take 1 tablet (5 mg total) by mouth daily 90 tablet 3   • pantoprazole (PROTONIX) 40 mg tablet TAKE 1 TABLET (40 MG TOTAL) BY MOUTH DAILY IN THE EARLY MORNING 90 tablet 3   • potassium chloride (K-DUR,KLOR-CON) 10 mEq tablet Take 2 tablets (20 mEq total) by mouth daily 180 tablet 3   • Pradaxa 150 MG capsu TAKE 1 CAPSULE (150 MG TOTAL) BY MOUTH 2 (TWO) TIMES A  capsule 3   • rosuvastatin (CRESTOR) 10 MG tablet TAKE 0.5 TABLETS (5 MG TOTAL) BY MOUTH DAILY 45 tablet 3   • verapamil (CALAN-SR) 240 mg CR tablet Take 1 tablet (240 mg total) by mouth daily at bedtime 90 tablet 3   • nitroglycerin (NITROSTAT) 0.4 mg SL tablet Place 1 tablet (0.4 mg total) under the tongue every 5 (five) minutes as needed for chest pain (Patient not taking: Reported on 8/3/2023)  0   • polyethylene glycol (MIRALAX) 17 g packet Take 17 g by mouth daily as needed (First line and refer to bowel protocol) (Patient not taking: Reported on 5/31/2023)  0     Current Facility-Administered Medications   Medication Dose Route Frequency Provider Last Rate Last Admin   • cyanocobalamin injection 1,000 mcg  1,000 mcg Intramuscular Q30 Days Kailyn Muhammad MD   1,000 mcg at 06/13/23 1517     PRN Meds:.        Family History   Problem Relation Age of Onset   • Diabetes Mother    • Breast cancer Sister 48   • No Known Problems Father    • No Known Problems Maternal Grandmother    • No Known Problems Maternal Grandfather    • No Known Problems Paternal Grandmother    • No Known Problems Paternal Grandfather    • No Known Problems Daughter    • No Known Problems Son    • No Known Problems Sister    • No Known Problems Sister    • No Known Problems Brother    • No Known Problems Brother    • No Known Problems Sister    • No Known Problems Paternal Aunt    • No Known Problems Paternal Aunt    • No Known Problems Paternal Aunt    • No Known Problems Paternal Aunt        Social History     Socioeconomic History   • Marital status:       Spouse name: Not on file   • Number of children: Not on file   • Years of education: Not on file   • Highest education level: Not on file   Occupational History   • Occupation: retired   Tobacco Use   • Smoking status: Never   • Smokeless tobacco: Never   Vaping Use   • Vaping Use: Never used Substance and Sexual Activity   • Alcohol use: Never   • Drug use: No   • Sexual activity: Not Currently     Comment:    Other Topics Concern   • Not on file   Social History Narrative    Housing, household, and economic circumstances      Social Determinants of Health     Financial Resource Strain: Low Risk  (5/10/2023)    Overall Financial Resource Strain (CARDIA)    • Difficulty of Paying Living Expenses: Not hard at all   Food Insecurity: No Food Insecurity (5/10/2023)    Hunger Vital Sign    • Worried About Running Out of Food in the Last Year: Never true    • Ran Out of Food in the Last Year: Never true   Transportation Needs: No Transportation Needs (5/10/2023)    PRAPARE - Transportation    • Lack of Transportation (Medical): No    • Lack of Transportation (Non-Medical): No   Physical Activity: Inactive (4/14/2021)    Exercise Vital Sign    • Days of Exercise per Week: 0 days    • Minutes of Exercise per Session: 0 min   Stress: No Stress Concern Present (4/14/2021)    [facility]    • Feeling of Stress : Not at all   Social Connections: Moderately Isolated (4/14/2021)    Social Connection and Isolation Panel [NHANES]    • Frequency of Communication with Friends and Family: More than three times a week    • Frequency of Social Gatherings with Friends and Family: More than three times a week    • Attends [employer] Services: More than 4 times per year    • Active Member of Clubs or Organizations: No    • Attends Club or Organization Meetings: Never    • Marital Status:     Intimate Partner Violence: Not At Risk (4/14/2021)    Humiliation, Afraid, Rape, and Kick questionnaire    • Fear of Current or Ex-Partner: No    • Emotionally Abused: No    • Physically Abused: No    • Sexually Abused: No   Housing Stability: Unknown (8/13/2022)    Housing Stability Vital Sign    • Unable to Pay for Housing in the Last Year: No    • Number of Places Lived in the Last Year: Not on file    • Unstable Housing in the Last Year: No         OBJECTIVE:    /62 (BP Location: Right arm, Patient Position: Sitting, Cuff Size: Standard)   Pulse 74   Ht 5' 3" (1.6 m)   Wt 65.1 kg (143 lb 8 oz)   BMI 25.42 kg/m²   Vitals:    08/03/23 0933   Weight: 65.1 kg (143 lb 8 oz)     /62 (BP Location: Right arm, Patient Position: Sitting, Cuff Size: Standard)   Pulse 74   Ht 5' 3" (1.6 m)   Wt 65.1 kg (143 lb 8 oz)   BMI 25.42 kg/m²   Vitals:    08/03/23 0933   Weight: 65.1 kg (143 lb 8 oz)     /62 (BP Location: Right arm, Patient Position: Sitting, Cuff Size: Standard)   Pulse 74   Ht 5' 3" (1.6 m)   Wt 65.1 kg (143 lb 8 oz)   BMI 25.42 kg/m²   Vitals:    08/03/23 0933   Weight: 65.1 kg (143 lb 8 oz)     /62 (BP Location: Right arm, Patient Position: Sitting, Cuff Size: Standard)   Pulse 74   Ht 5' 3" (1.6 m)   Wt 65.1 kg (143 lb 8 oz)   BMI 25.42 kg/m²   Vitals:    08/03/23 0933   Weight: 65.1 kg (143 lb 8 oz)     GEN: No acute distress, Alert and oriented, well appearing  HEENT:External ears normal, EYES: Pupils equal, sclera anicteric, midline, normal conjuctiva  NECK: No JVD, supple, no obvious masses or thryomegaly or goiter  CARDIOVASCULAR:  RRR, No murmur, rub, gallops S1,S2  LUNGS: Clear To auscultation bilaterally, normal effort, no rales, rhonchi, crackles   ABDOMEN:  nondistended,  without obvious organomegaly or ascites  EXTREMITIES/VASCULAR: 1+ pitting on left leg  PSYCH: Normal Affect,  linear speech pattern without evidence of psychosis. NEURO: left arm and left leg weakness but able to move  GAIT:  In wheelchair  HEME: No bleeding, bruising, petechia, purpura   SKIN: No significant rashes on visibile skin, warm, no diaphoresis or pallor.    .         Lab Results:     @RESULTRCNT(1M])@    CBC with diff:     CMP:      Invalid input(s): "ALBUMIN"  TSH:       Lipid Profile:             I reviewed and interpreted the following LABS/EKG/TELE/IMAGING and below is summary of my interpretation (if data available):    EKG Afib w V pacing 77bpm

## 2023-08-06 NOTE — PROGRESS NOTES
Advanced Heart Failure/Pulmonary Hypertension Outpatient Note - Rikki Jeffries 80 y.o. female MRN: [id number]    @ Encounter: [id number]    Assessment:  80 y.o. female Hx per chart p/w HF fu.    Follows primarily with EP Dr. Genaro Fuller for cardiology  AF on [facility]  Preserved EF  Mild-mod AI on 2022 echo  CVA with L hemiparesis  PH. +RVOT notching. RV mildly dilated with nl fxn. Significant group II contribution likely. TBS s/p VVI pacemaker. Per chart:  "She had left sided erosion remotely and lead extracted. Paces up to 76 % since they increased bystolic"  Nonobstructive CAD. Had [address] for borderline stress test 50% RCA and nonobstructive.   HTN  CKD  Anemia  Vit B12 deficient and gets injections        I have reviewed all pertinent patient data including but not limited to:    Lab Results   Component Value Date    CREATININE 0.92 09/12/2022     Lab Results   Component Value Date    K 3.9 09/12/2022     Lab Results   Component Value Date    HGBA1C 5.4 08/13/2022     Lab Results   Component Value Date    QBT6VSNTPWOT 3.320 09/07/2022     Lab Results   Component Value Date    LDLCALC 38 08/13/2022     No results found for: "BNP"   Lab Results   Component Value Date    NTBNP 778 (H) 08/09/2021        TODAY'S PLAN:  I am meeting patient for the first time today  Warm, mildly vol up on exam  Lasix recently doubled to lasix 20 bid from qd - within past week - making more urine, she feels slightly better now  Her main complaint is fatigue and SOB, sometimes profound, worse over past year since CVA and also after covid vaccine she says  High RV pacing noted  Office and her home SBP consistently in 130-140s, she keeps good log at home    Her symptoms are likely multifactorial    new echo scheduled 8/17/23 by another provider  She will RTC after echo - with enhanced diuresis and BP control    cw recently increased lasix 20 bid now, kdur    Increase losartan 50 qd>75 qd  Discussed risks/benefits/red flags in this fragile elderly patient    jardiance could be consideration in future    No other med changes today    Limited role for RHC at present time  Mar reconsider pending Tx as above    Her echo and clinical Hx suggests possible infiltrative CM such as cardiac amyloidosis  Of course she certainly has chronic HTN that can explain similar findings  Will pursue CA workup now - AL screen and pyp ordered  We discussed possible additional questions these tests may raise and potential need for more downstream tests if this leads us to confusion    PPM,  ICD , CRT (if applicable): Interrogation:  5/11/23:  MDT-SINGLE CHAMBER PPM / NOT MRI CONDITIONAL   CARELINK TRANSMISSION: BATTERY VOLTAGE ADEQUATE (9 YRS).  69.3% (>40%/VVIR 60); ALL AVAILABLE LEAD PARAMETERS WITHIN NORMAL LIMITS. NO SIGNIFICANT HIGH RATE EPISODES. NORMAL DEVICE FUNCTION. Studies:  I have reviewed all pertinent patient data/labs/imaging where available, including but not limited to the below studies. Selected results may be displayed here but comprehensive listing is omitted for note clarity and can be found in the epic chart. ECG. Echo. Stress. Cath. HPI:   80 y.o. female Hx per chart p/w HF fu.  No new CP/SOB/dizziness/palpitations/syncope. No new fatigue. No new unintentional weight changes. No new leg swelling, PND, pillow orthopnea. No new fevers, chills, cough, nausea, vomiting, diarrhea, dysuria. Interval History:   As noted in 'plan' section above and prior epic chart notes. Past Medical History:   Diagnosis Date   • A-fib ([address])    • Anemia    • Arthritis    • Breast pain, right    • Chest pain on breathing    • Colon polyp    • Cough    • Diverticulosis    • Encounter for screening colonoscopy    • H/O sick sinus syndrome    • Heart murmur    • High cholesterol    • History of atrial fibrillation     Rate ontrolled.  On xarelto 15mg (creatinine 50) Followed by Dr. Salazar Rebolledo with Cardiology    • History of weight loss     Report loose fitting clothes. Weight stable (3lbs difference). Last colo showed small tubular adenoma x2. Breast biopsy last showed fibrocystic changes. TSH wnl. Will check cbc, bmp, spep.        • Hx of long term use of blood thinners    • Hypertension    • Irregular heart beat    • RICH (obstructive sleep apnea)    • Pacemaker    • Preop examination    • Shortness of breath    • Viral bronchitis      Patient Active Problem List    Diagnosis Date Noted   • Foot drop, left foot 03/27/2023   • RICH (obstructive sleep apnea)    • Pulmonary hypertension ([address]) 11/23/2022   • Belching 10/05/2022   • Transient neurological symptoms 09/08/2022   • Thrush 09/06/2022   • At risk for delirium 09/06/2022   • Valvular heart disease 08/20/2022   • CAD (coronary artery disease) 08/20/2022   • Urinary retention 08/20/2022   • Neck pain 08/20/2022   • Adjustment disorder with mixed emotional features 08/20/2022   • Anemia 08/19/2022   • At risk for venous thromboembolism (VTE) 08/19/2022   • DARRIUS (acute kidney injury) ([address]) 08/19/2022   • Spastic hemiparesis of left dominant side as late effect of cerebral infarction ([address]) 08/19/2022   • History of CVA (cerebrovascular accident) 08/12/2022   • 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 ([address]) 08/12/2022   • Renal insufficiency    • Abnormal nuclear stress test 08/20/2021   • Diverticulosis of colon 05/20/2019   • Pacemaker 02/26/2018   • Tubulovillous adenoma 02/09/2018   • Gastroesophageal reflux disease without esophagitis 02/09/2018   • Other chest pain 12/08/2017   • Essential hypertension 12/08/2017   • Hyperlipidemia LDL goal <100 12/08/2017   • Stage 3 chronic kidney disease ([address]) 12/08/2017   • Osteoarthritis of both wrists 12/08/2017   • Chronic atrial fibrillation ([address]) 12/08/2017   • Cavus deformity of foot 06/27/2017   • Hallux abducto valgus, bilateral 06/27/2017   • Lipoma of right upper extremity 05/17/2017   • Pain due to onychomycosis of toenails of both feet 01/20/2017 • Allergic rhinitis due to pollen 10/12/2015   • Midline cystocele 12/23/2014   • Osteoarthritis of hip 07/17/2014   • B12 deficiency 12/06/2013   • Osteopenia 11/15/2013   • Left renal artery stenosis (HCC) 08/27/2013   • Left atrial enlargement 08/27/2013       ROS:  10 point ROS negative except as specified in HPI/interval history    Allergies   Allergen Reactions   • Other Itching     Bandaids   • Tape [Medical Tape] Itching       Current Outpatient Medications   Medication Instructions   • acetaminophen (TYLENOL) 650 mg, Oral, Every 6 hours PRN   • ammonium lactate (LAC-HYDRIN) 12 % cream Topical, As needed   • Blood Pressure Monitoring (Blood Pressure Cuff) MISC Does not apply, Every other day, For HTN   • cyanocobalamin 1,000 mcg, Intramuscular, Every 30 days   • furosemide (LASIX) 40 mg, Oral, Daily   • Incontinence Supply Disposable (RA Adult Wipes) MISC Does not apply, 4 times daily   • losartan (COZAAR) 50 mg, Oral, Daily   • losartan (COZAAR) 25 mg, Oral, Daily, Take at same time as 50mg tablet for total of 75mg daily   • melatonin 3 mg, Oral, Every evening   • menthol-methyl salicylate (BENGAY) 88-32 % cream Apply externally, 2 times daily   • nebivolol (BYSTOLIC) 5 mg, Oral, Daily   • nitroglycerin (NITROSTAT) 0.4 mg, Sublingual, Every 5 minutes PRN   • pantoprazole (PROTONIX) 40 mg, Oral, Daily (early morning)   • polyethylene glycol (MIRALAX) 17 g, Oral, Daily PRN   • potassium chloride (K-DUR,KLOR-CON) 10 mEq tablet 20 mEq, Oral, Daily   • Pradaxa 150 MG capsu TAKE 1 CAPSULE (150 MG TOTAL) BY MOUTH 2 (TWO) TIMES A DAY   • rosuvastatin (CRESTOR) 5 mg, Oral, Daily   • verapamil (CALAN-SR) 240 mg, Oral, Daily at bedtime        Social History     Socioeconomic History   • Marital status:       Spouse name: Not on file   • Number of children: Not on file   • Years of education: Not on file   • Highest education level: Not on file   Occupational History   • Occupation: retired   Tobacco Use   • Smoking status: Never   • Smokeless tobacco: Never   Vaping Use   • Vaping Use: Never used   Substance and Sexual Activity   • Alcohol use: Never   • Drug use: No   • Sexual activity: Not Currently     Comment:    Other Topics Concern   • Not on file   Social History Narrative    Housing, household, and economic circumstances      Social Determinants of Health     Financial Resource Strain: Low Risk  (5/10/2023)    Overall Financial Resource Strain (CARDIA)    • Difficulty of Paying Living Expenses: Not hard at all   Food Insecurity: No Food Insecurity (5/10/2023)    Hunger Vital Sign    • Worried About Running Out of Food in the Last Year: Never true    • Ran Out of Food in the Last Year: Never true   Transportation Needs: No Transportation Needs (5/10/2023)    PRAPARE - Transportation    • Lack of Transportation (Medical): No    • Lack of Transportation (Non-Medical): No   Physical Activity: Inactive (4/14/2021)    Exercise Vital Sign    • Days of Exercise per Week: 0 days    • Minutes of Exercise per Session: 0 min   Stress: No Stress Concern Present (4/14/2021)    [facility]    • Feeling of Stress : Not at all   Social Connections: Moderately Isolated (4/14/2021)    Social Connection and Isolation Panel [NHANES]    • Frequency of Communication with Friends and Family: More than three times a week    • Frequency of Social Gatherings with Friends and Family: More than three times a week    • Attends Muslim Services: More than 4 times per year    • Active Member of Clubs or Organizations: No    • Attends Club or Organization Meetings: Never    • Marital Status:     Intimate Partner Violence: Not At Risk (4/14/2021)    Humiliation, Afraid, Rape, and Kick questionnaire    • Fear of Current or Ex-Partner: No    • Emotionally Abused: No    • Physically Abused: No    • Sexually Abused: No   Housing Stability: Unknown (8/13/2022)    Housing Stability Vital Sign    • Unable to Pay for Housing in the Last Year: No    • Number of Places Lived in the Last Year: Not on file    • Unstable Housing in the Last Year: No       Family History   Problem Relation Age of Onset   • Diabetes Mother    • Breast cancer Sister 48   • No Known Problems Father    • No Known Problems Maternal Grandmother    • No Known Problems Maternal Grandfather    • No Known Problems Paternal Grandmother    • No Known Problems Paternal Grandfather    • No Known Problems Daughter    • No Known Problems Son    • No Known Problems Sister    • No Known Problems Sister    • No Known Problems Brother    • No Known Problems Brother    • No Known Problems Sister    • No Known Problems Paternal Aunt    • No Known Problems Paternal Aunt    • No Known Problems Paternal Aunt    • No Known Problems Paternal Aunt        Physical Exam:  Vitals:    08/07/23 0800   BP: 158/72   BP Location: Right arm   Patient Position: Sitting   Cuff Size: Standard   Pulse: 84   SpO2: 97%   Weight: 65.9 kg (145 lb 4.8 oz)   Height: 5' 3" (1.6 m)     Constitutional: NAD, non toxic  Ears/nose/mouth/throat: atraumatic  CV: RRR, +JVD  Resp: ctabl  GI: Soft, NTND  MSK: no swollen joints in exposed areas  Extr: +1 LE edema L>R (since CVA), warm LE  Pysche: Normal affect  Neuro: appropriate in conversation  Skin: dry and intact in exposed areas    Labs & Results:    Lab Results   Component Value Date    SODIUM 138 09/12/2022    K 3.9 09/12/2022     (H) 09/12/2022    CO2 25 09/12/2022    BUN 18 09/12/2022    CREATININE 0.92 09/12/2022    GLUC 86 09/12/2022    CALCIUM 8.7 09/12/2022     Lab Results   Component Value Date    WBC 6.57 09/12/2022    HGB 10.4 (L) 09/12/2022    HCT 32.4 (L) 09/12/2022    MCV 94 09/12/2022     09/12/2022     No results found for: "BNP"   Lab Results   Component Value Date    CHOLESTEROL 100 08/13/2022    CHOLESTEROL 149 08/16/2021    CHOLESTEROL 146 08/18/2020     Lab Results   Component Value Date    HDL 48 (L) 08/13/2022    HDL 62 08/16/2021    HDL 57 08/18/2020     Lab Results   Component Value Date    TRIG 71 08/13/2022    TRIG 101 08/16/2021    TRIG 108 08/18/2020     Lab Results   Component Value Date    [address] 52 08/13/2022    [address] 87 08/16/2021    [address] 89 08/18/2020       Counseling / Coordination of Care  Time spent today 27 minutes. Greater than 50% of total time was spent with the patient and / or family counseling and / or coordination of care. We discussed diagnoses, most recent studies, tests and any changes in treatment plan. Thank you for the opportunity to participate in the care of this patient.     Yue Salas MD  Attending Physician  Advanced Heart Failure and Transplant Cardiology  [address] [facility]

## 2023-08-07 ENCOUNTER — TELEPHONE (OUTPATIENT)
Dept: INTERNAL MEDICINE CLINIC | Facility: CLINIC | Age: 83
End: 2023-08-07

## 2023-08-07 ENCOUNTER — OFFICE VISIT (OUTPATIENT)
Dept: CARDIOLOGY CLINIC | Facility: CLINIC | Age: 83
End: 2023-08-07
Payer: MEDICARE

## 2023-08-07 ENCOUNTER — OFFICE VISIT (OUTPATIENT)
Dept: INTERNAL MEDICINE CLINIC | Facility: CLINIC | Age: 83
End: 2023-08-07

## 2023-08-07 VITALS
WEIGHT: 145 LBS | HEIGHT: 63 IN | DIASTOLIC BLOOD PRESSURE: 90 MMHG | TEMPERATURE: 97.8 F | BODY MASS INDEX: 25.69 KG/M2 | OXYGEN SATURATION: 99 % | SYSTOLIC BLOOD PRESSURE: 160 MMHG | HEART RATE: 85 BPM

## 2023-08-07 VITALS
WEIGHT: 145.3 LBS | HEART RATE: 84 BPM | BODY MASS INDEX: 25.75 KG/M2 | HEIGHT: 63 IN | DIASTOLIC BLOOD PRESSURE: 72 MMHG | SYSTOLIC BLOOD PRESSURE: 158 MMHG | OXYGEN SATURATION: 97 %

## 2023-08-07 DIAGNOSIS — I48.20 CHRONIC ATRIAL FIBRILLATION (HCC): ICD-10-CM

## 2023-08-07 DIAGNOSIS — I51.7 LVH (LEFT VENTRICULAR HYPERTROPHY): ICD-10-CM

## 2023-08-07 DIAGNOSIS — R06.00 DYSPNEA, UNSPECIFIED TYPE: ICD-10-CM

## 2023-08-07 DIAGNOSIS — E78.5 HYPERLIPIDEMIA LDL GOAL <100: ICD-10-CM

## 2023-08-07 DIAGNOSIS — R60.9 EDEMA, UNSPECIFIED TYPE: ICD-10-CM

## 2023-08-07 DIAGNOSIS — G47.33 OSA (OBSTRUCTIVE SLEEP APNEA): ICD-10-CM

## 2023-08-07 DIAGNOSIS — E53.8 B12 DEFICIENCY: Chronic | ICD-10-CM

## 2023-08-07 DIAGNOSIS — N18.31 STAGE 3A CHRONIC KIDNEY DISEASE (HCC): ICD-10-CM

## 2023-08-07 DIAGNOSIS — I10 ESSENTIAL HYPERTENSION: ICD-10-CM

## 2023-08-07 DIAGNOSIS — Z95.0 CARDIAC PACEMAKER IN SITU: ICD-10-CM

## 2023-08-07 DIAGNOSIS — I27.20 PULMONARY HYPERTENSION (HCC): Primary | ICD-10-CM

## 2023-08-07 DIAGNOSIS — Z00.00 MEDICARE ANNUAL WELLNESS VISIT, SUBSEQUENT: Primary | ICD-10-CM

## 2023-08-07 PROCEDURE — 3080F DIAST BP >= 90 MM HG: CPT | Performed by: INTERNAL MEDICINE

## 2023-08-07 PROCEDURE — 99214 OFFICE O/P EST MOD 30 MIN: CPT | Performed by: STUDENT IN AN ORGANIZED HEALTH CARE EDUCATION/TRAINING PROGRAM

## 2023-08-07 PROCEDURE — G0439 PPPS, SUBSEQ VISIT: HCPCS | Performed by: INTERNAL MEDICINE

## 2023-08-07 PROCEDURE — 3077F SYST BP >= 140 MM HG: CPT | Performed by: INTERNAL MEDICINE

## 2023-08-07 RX ORDER — LOSARTAN POTASSIUM 25 MG/1
25 TABLET ORAL DAILY
Qty: 90 TABLET | Refills: 5 | Status: SHIPPED | OUTPATIENT
Start: 2023-08-07 | End: 2025-01-28

## 2023-08-07 NOTE — PROGRESS NOTES
Assessment and Plan:     Problem List Items Addressed This Visit        Respiratory    RICH (obstructive sleep apnea)     Sleep study in 5/2023 showing sleep apnea recommending CPAP    -Has CPAP study in 1/2024  -RICH could be driving blood pressure and recommended to see sleep medicine sooner to expedite CPAP process            Cardiovascular and Mediastinum    Essential hypertension     BP today was 177/49 with repeat 160/90, patients keeps log at home and states SBP is between 130-150  -Heart failure increased losartan to 75 daily  -Will have follow up in 3 months with the intention of patient having all tests done before         Chronic atrial fibrillation (HCC)     Currently on Pradaxa            Other    B12 deficiency (Chronic)     -On B12 therapy for many years  -Will order repeat level         Relevant Orders    Vitamin B12    Dyspnea     Patient states she has been having fatigue and some SOB for the past 3 weeks. Prior Echo on 8/2022 showed EF 65,RVSP 64  -Does have severe RICH  Saw HF this morning:  -Increased Lasix to 40 daily  -Increased Losartan to 75 daily      -Echo pending  -Cardiac amyloid workup         Other Visit Diagnoses     Medicare annual wellness visit, subsequent    -  Primary           Preventive health issues were discussed with patient, and age appropriate screening tests were ordered as noted in patient's After Visit Summary. Personalized health advice and appropriate referrals for health education or preventive services given if needed, as noted in patient's After Visit Summary. History of Present Illness:     Patient presents for a Medicare Wellness Visit. Patient is a 81 y/o with extensive PMH including GERD, Afib, HTN, RICH, CAD, CVA in 2022, CKD3, SSS (s/p pacemaker) seen today for MWV. Patient states she has been feeling more fatigued lately and SOB. Patient was seen by HF today and adjustments were made by HF noted above.       Patient Care Team:  Corrina Camilo MD as PCP - General (Internal Medicine)  Nicole Jang DO as Endoscopist     Review of Systems:     Review of Systems   Constitutional: Positive for fatigue. Negative for appetite change. HENT: Negative. Respiratory: Positive for shortness of breath. Negative for cough. Cardiovascular: Positive for leg swelling. Negative for chest pain and palpitations. Gastrointestinal: Negative for abdominal distention, abdominal pain, constipation, diarrhea, nausea and vomiting. Genitourinary: Positive for frequency. Negative for difficulty urinating and dysuria. Musculoskeletal: Negative for arthralgias and myalgias. Neurological: Negative for dizziness and headaches. Psychiatric/Behavioral: Negative.          Problem List:     Patient Active Problem List   Diagnosis   • Other chest pain   • Essential hypertension   • Hyperlipidemia LDL goal <100   • Stage 3 chronic kidney disease (HCC)   • Osteoarthritis of both wrists   • Chronic atrial fibrillation (HCC)   • Tubulovillous adenoma   • Gastroesophageal reflux disease without esophagitis   • Pacemaker   • B12 deficiency   • Allergic rhinitis due to pollen   • Cavus deformity of foot   • Midline cystocele   • Hallux abducto valgus, bilateral   • Left renal artery stenosis (HCC)   • Osteoarthritis of hip   • Osteopenia   • Pain due to onychomycosis of toenails of both feet   • Left atrial enlargement   • Lipoma of right upper extremity   • Diverticulosis of colon   • Abnormal nuclear stress test   • Renal insufficiency   • History of CVA (cerebrovascular accident)   • 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)   • Anemia   • At risk for venous thromboembolism (VTE)   • DARRIUS (acute kidney injury) ([address])   • Spastic hemiparesis of left dominant side as late effect of cerebral infarction [facility])   • Valvular heart disease   • CAD (coronary artery disease)   • Urinary retention   • Neck pain   • Adjustment disorder with mixed emotional features   • Thrush   • At risk for delirium   • Transient neurological symptoms   • Belching   • Pulmonary hypertension (HCC)   • RICH (obstructive sleep apnea)   • Foot drop, left foot   • Dyspnea      Past Medical and Surgical History:     Past Medical History:   Diagnosis Date   • A-fib (HCC)    • Anemia    • Arthritis    • Breast pain, right    • Chest pain on breathing    • Colon polyp    • Cough    • Diverticulosis    • Encounter for screening colonoscopy    • H/O sick sinus syndrome    • Heart murmur    • High cholesterol    • History of atrial fibrillation     Rate ontrolled. On xarelto 15mg (creatinine 50) Followed by Dr. Avila Massey with Cardiology    • History of weight loss     Report loose fitting clothes. Weight stable (3lbs difference). Last colo showed small tubular adenoma x2. Breast biopsy last showed fibrocystic changes. TSH wnl. Will check cbc, bmp, spep. • Hx of long term use of blood thinners    • Hypertension    • Irregular heart beat    • RICH (obstructive sleep apnea)    • Pacemaker    • Preop examination    • Shortness of breath    • Viral bronchitis      Past Surgical History:   Procedure Laterality Date   • BREAST BIOPSY Right 08/17/2006    ultrasound guided Percutaneous Needle Core -Benign   • CATARACT EXTRACTION     • CATARACT EXTRACTION W/ INTRAOCULAR LENS  IMPLANT, BILATERAL     • COLONOSCOPY     • COLONOSCOPY N/A 5/22/2018    Procedure: COLONOSCOPY;  Surgeon: Liliana Bobby DO;  [facility];   Service: Gastroenterology   • INSERT / Ashlee Ortega / Ralf Ayon     • LEG SURGERY Right     as a child after a fall   • MAMMO STEREOTACTIC BREAST BIOPSY RIGHT (ALL INC) Right 10/2014    benign   • MD CMBND ANTERPOST COLPORRAPHY W/CYSTO N/A 3/17/2016    Procedure: COLPORRHAPHY ANTERIOR POSTERIOR ;  Surgeon: Cheyenne Resendez MD;  [facility];  Service: Gynecology   • [facility] WHEN PFRMD N/A 4/4/2019    Procedure: COLONOSCOPY;  Surgeon: Liliana Bobby DO; Location: [facility]; Service: Gastroenterology   • MI COLPOPEXY VAGINAL EXTRAPERITONEAL APPROACH N/A 3/17/2016    Procedure: COLPOPEXY VAGINAL EXTRAPERITONEAL (VEC) ANTERIOR ;  Surgeon: Serge Anthony MD;  [facility];  Service: Gynecology   • MI CYSTOURETHROSCOPY N/A 3/17/2016    Procedure: CYSTOSCOPY;  Surgeon: Serge Anthony MD;  [facility];  Service: Gynecology   • MI ESOPHAGOGASTRODUODENOSCOPY TRANSORAL DIAGNOSTIC N/A 4/16/2018    Procedure: EGD AND COLONOSCOPY;  Surgeon: Marc Hill DO;  Location: [facility]; Service: Gastroenterology   • MI LAPAROSCOPY COLECTOMY PARTIAL W/ANASTOMOSIS Right 1/13/2022    Procedure: Diagnostic laparoscopy, laparscopic right colectomy;  Surgeon: Jazmyn Lane MD;  Location[facility];  Service: Colorectal   • MI SLING OPERATION STRESS INCONTINENCE N/A 3/17/2016    Procedure: INSERTION PUBOVAGINAL SLING SINGLE INCISION ;  Surgeon: Serge Anthony MD;  [facility];  Service: Gynecology      Family History:     Family History   Problem Relation Age of Onset   • Diabetes Mother    • Breast cancer Sister 48   • No Known Problems Father    • No Known Problems Maternal Grandmother    • No Known Problems Maternal Grandfather    • No Known Problems Paternal Grandmother    • No Known Problems Paternal Grandfather    • No Known Problems Daughter    • No Known Problems Son    • No Known Problems Sister    • No Known Problems Sister    • No Known Problems Brother    • No Known Problems Brother    • No Known Problems Sister    • No Known Problems Paternal Aunt    • No Known Problems Paternal Aunt    • No Known Problems Paternal Aunt    • No Known Problems Paternal Aunt       Social History:     Social History     Socioeconomic History   • Marital status:       Spouse name: None   • Number of children: None   • Years of education: None   • Highest education level: None   Occupational History   • Occupation: retired   Tobacco Use   • Smoking status: Never   • Smokeless tobacco: Never   Vaping Use   • Vaping Use: Never used   Substance and Sexual Activity   • Alcohol use: Never   • Drug use: No   • Sexual activity: Not Currently     Comment:    Other Topics Concern   • None   Social History Narrative    Housing, household, and economic circumstances      Social Determinants of Health     Financial Resource Strain: Low Risk  (8/7/2023)    Overall Financial Resource Strain (CARDIA)    • Difficulty of Paying Living Expenses: Not very hard   Food Insecurity: No Food Insecurity (8/7/2023)    Hunger Vital Sign    • Worried About Running Out of Food in the Last Year: Never true    • Ran Out of Food in the Last Year: Never true   Transportation Needs: No Transportation Needs (8/7/2023)    PRAPARE - Transportation    • Lack of Transportation (Medical): No    • Lack of Transportation (Non-Medical): No   Physical Activity: Inactive (4/14/2021)    Exercise Vital Sign    • Days of Exercise per Week: 0 days    • Minutes of Exercise per Session: 0 min   Stress: No Stress Concern Present (4/14/2021)    [address] [place] [address]    • Feeling of Stress : Not at all   Social Connections: Moderately Isolated (4/14/2021)    Social Connection and Isolation Panel [NHANES]    • Frequency of Communication with Friends and Family: More than three times a week    • Frequency of Social Gatherings with Friends and Family: More than three times a week    • Attends Judaism Services: More than 4 times per year    • Active Member of Clubs or Organizations: No    • Attends Club or Organization Meetings: Never    • Marital Status:     Intimate Partner Violence: Not At Risk (4/14/2021)    Humiliation, Afraid, Rape, and Kick questionnaire    • Fear of Current or Ex-Partner: No    • Emotionally Abused: No    • Physically Abused: No    • Sexually Abused: No   Housing Stability: Unknown (8/13/2022)    Housing Stability Vital Sign    • Unable to Pay for Housing in the Last Year: No    • Number of Places Lived in the Last Year: Not on file    • Unstable Housing in the Last Year: No      Medications and Allergies:     Current Outpatient Medications   Medication Sig Dispense Refill   • acetaminophen (TYLENOL) 325 mg tablet Take 2 tablets (650 mg total) by mouth every 6 (six) hours as needed for mild pain  0   • ammonium lactate (LAC-HYDRIN) 12 % cream Apply topically as needed for dry skin 385 g 1   • Blood Pressure Monitoring (Blood Pressure Cuff) MISC Use every other day For HTN 1 each 0   • cyanocobalamin 1,000 mcg/mL INJECT 1 ML (1,000 MCG TOTAL) INTO A MUSCLE EVERY 30 (THIRTY) DAYS 3 mL 0   • furosemide (LASIX) 20 mg tablet Take 2 tablets (40 mg total) by mouth daily 90 tablet 3   • Incontinence Supply Disposable (RA Adult Wipes) MISC Use 4 (four) times a day 64 each 10   • losartan (COZAAR) 25 mg tablet Take 1 tablet (25 mg total) by mouth daily Take at same time as 50mg tablet for total of 75mg daily 90 tablet 5   • losartan (COZAAR) 50 mg tablet TAKE 1 TABLET (50 MG TOTAL) BY MOUTH DAILY 90 tablet 3   • melatonin 3 mg Take 1 tablet (3 mg total) by mouth every evening 30 tablet 0   • menthol-methyl salicylate (BENGAY) 88-13 % cream Apply topically 2 (two) times a day  0   • nebivolol (BYSTOLIC) 5 mg tablet Take 1 tablet (5 mg total) by mouth daily 90 tablet 3   • nitroglycerin (NITROSTAT) 0.4 mg SL tablet Place 1 tablet (0.4 mg total) under the tongue every 5 (five) minutes as needed for chest pain (Patient not taking: Reported on 8/3/2023)  0   • pantoprazole (PROTONIX) 40 mg tablet TAKE 1 TABLET (40 MG TOTAL) BY MOUTH DAILY IN THE EARLY MORNING 90 tablet 3   • potassium chloride (K-DUR,KLOR-CON) 10 mEq tablet Take 2 tablets (20 mEq total) by mouth daily 180 tablet 3   • Pradaxa 150 MG capsu TAKE 1 CAPSULE (150 MG TOTAL) BY MOUTH 2 (TWO) TIMES A  capsule 3   • rosuvastatin (CRESTOR) 10 MG tablet TAKE 0.5 TABLETS (5 MG TOTAL) BY MOUTH DAILY 45 tablet 3   • verapamil (CALAN-SR) 240 mg CR tablet Take 1 tablet (240 mg total) by mouth daily at bedtime 90 tablet 3     Current Facility-Administered Medications   Medication Dose Route Frequency Provider Last Rate Last Admin   • cyanocobalamin injection 1,000 mcg  1,000 mcg Intramuscular Q30 Days Marce Wong MD   1,000 mcg at 06/13/23 1517     Allergies   Allergen Reactions   • Other Itching     Bandaids   • Tape [Medical Tape] Itching      Immunizations:     Immunization History   Administered Date(s) Administered   • COVID-19 MODERNA VACC 0.5 ML IM 02/19/2021, 05/27/2021   • COVID-19 Pfizer Vac BIVALENT Jermaine-sucrose 12 Yr+ IM (BOOSTER ONLY) 09/09/2022   • INFLUENZA 10/11/2005, 10/24/2006, 10/15/2007, 09/28/2015, 10/06/2016, 10/18/2017, 10/05/2022   • Influenza Quadrivalent Preservative Free 3 years and older IM 10/06/2016   • Influenza Quadrivalent, 6-35 Months IM 10/18/2017   • Influenza, high dose seasonal 0.7 mL 10/12/2018, 09/23/2019, 09/23/2020, 09/22/2021, 10/05/2022   • Influenza, seasonal, injectable 09/24/2013, 09/24/2014, 09/28/2015   • Pneumococcal Conjugate 13-Valent 05/31/2016   • Pneumococcal Polysaccharide PPV23 01/01/2001, 12/18/2019   • Tdap 03/12/2014      Health Maintenance:         Topic Date Due   • Colorectal Cancer Screening  09/20/2022         Topic Date Due   • COVID-19 Vaccine (4 - Moderna series) 01/09/2023   • Influenza Vaccine (1) 09/01/2023      Medicare Screening Tests and Risk Assessments:     Milly Vazquez is here for her Subsequent Wellness visit. Health Risk Assessment:   Patient rates overall health as poor. Patient feels that their physical health rating is slightly worse. Patient is satisfied with their life. Eyesight was rated as same. Hearing was rated as slightly worse. Patient feels that their emotional and mental health rating is much better. Patients states they are never, rarely angry.  Patient states they are always unusually tired/fatigued. Pain experienced in the last 7 days has been some. Patient's pain rating has been 6/10. Patient states that she has experienced no weight loss or gain in last 6 months. Fall Risk Screening: In the past year, patient has experienced: history of falling in past year    Number of falls: 2 or more  Injured during fall?: No    Feels unsteady when standing or walking?: Yes    Worried about falling?: Yes      Urinary Incontinence Screening:   Patient has not leaked urine accidently in the last six months. Home Safety:  Patient has trouble with stairs inside or outside of their home. Patient has working smoke alarms and has working carbon monoxide detector. Home safety hazards include: none. Nutrition:   Current diet is Regular. Medications:   Patient is currently taking over-the-counter supplements. OTC medications include: see medication list. Patient is able to manage medications. Activities of Daily Living (ADLs)/Instrumental Activities of Daily Living (IADLs):   Walk and transfer into and out of bed and chair?: No  Dress and groom yourself?: No    Bathe or shower yourself?: No    Feed yourself? Yes  Do your laundry/housekeeping?: No  Manage your money, pay your bills and track your expenses?: No  Make your own meals?: No    Do your own shopping?: No    PREVENTIVE SCREENINGS      Cardiovascular Screening:    General: Screening Not Indicated and History Lipid Disorder      Diabetes Screening:     General: Screening Current      Breast Cancer Screening:     General: Screening Current      Cervical Cancer Screening:    General: Screening Not Indicated      Lung Cancer Screening:     General: Screening Not Indicated    Screening, Brief Intervention, and Referral to Treatment (SBIRT)    Screening  Typical number of drinks in a day: 0  Typical number of drinks in a week: 0  Interpretation: Low risk drinking behavior.     Single Item Drug Screening:  How often have you used an illegal drug (including marijuana) or a prescription medication for non-medical reasons in the past year? never    Single Item Drug Screen Score: 0  Interpretation: Negative screen for possible drug use disorder    No results found. Physical Exam:     /90 (BP Location: Left arm, Patient Position: Sitting, Cuff Size: Adult)   Pulse 85   Temp 97.8 °F (36.6 °C) (Temporal)   Ht 5' 3" (1.6 m)   Wt 65.8 kg (145 lb)   SpO2 99%   BMI 25.69 kg/m²     Physical Exam  Vitals reviewed. Constitutional:       Appearance: Normal appearance. HENT:      Head: Normocephalic and atraumatic. Mouth/Throat:      Mouth: Mucous membranes are moist.      Pharynx: Oropharynx is clear. Neck:      Comments: +JVD  Cardiovascular:      Rate and Rhythm: Normal rate. Rhythm irregular. Pulmonary:      Effort: Pulmonary effort is normal.      Breath sounds: Normal breath sounds. Abdominal:      General: Abdomen is flat. Bowel sounds are normal. There is no distension. Tenderness: There is no abdominal tenderness. Musculoskeletal:      Right lower leg: Edema present. Left lower leg: Edema present. Comments: 1+ b/l, L>R at baseline from CVA   Skin:     General: Skin is warm and dry. Neurological:      Mental Status: She is alert and oriented to person, place, and time.    Psychiatric:         Mood and Affect: Mood normal.         Behavior: Behavior normal.          Mike Hogan MD

## 2023-08-07 NOTE — ASSESSMENT & PLAN NOTE
-On B12 therapy for many years  -Will order repeat level
BP today was 177/49 with repeat 160/90, patients keeps log at home and states SBP is between 130-150  -Heart failure increased losartan to 75 daily  -Will have follow up in 3 months with the intention of patient having all tests done before
Currently on Pradaxa
Patient states she has been having fatigue and some SOB for the past 3 weeks. Prior Echo on 8/2022 showed EF 65,RVSP 64  -Does have severe RICH  Saw HF this morning:  -Increased Lasix to 40 daily  -Increased Losartan to 75 daily      -Echo pending  -Cardiac amyloid workup
Sleep study in 5/2023 showing sleep apnea recommending CPAP    -Has CPAP study in 1/2024  -RICH could be driving blood pressure and recommended to see sleep medicine sooner to expedite CPAP process
No

## 2023-08-07 NOTE — PATIENT INSTRUCTIONS
Medicare Preventive Visit Patient Instructions  Thank you for completing your Welcome to Medicare Visit or Medicare Annual Wellness Visit today. Your next wellness visit will be due in one year (8/7/2024). The screening/preventive services that you may require over the next 5-10 years are detailed below. Some tests may not apply to you based off risk factors and/or age. Screening tests ordered at today's visit but not completed yet may show as past due. Also, please note that scanned in results may not display below. Preventive Screenings:  Service Recommendations Previous Testing/Comments   Colorectal Cancer Screening  * Colonoscopy    * Fecal Occult Blood Test (FOBT)/Fecal Immunochemical Test (FIT)  * Fecal DNA/Cologuard Test  * Flexible Sigmoidoscopy Age: 43-73 years old   Colonoscopy: every 10 years (may be performed more frequently if at higher risk)  OR  FOBT/FIT: every 1 year  OR  Cologuard: every 3 years  OR  Sigmoidoscopy: every 5 years  Screening may be recommended earlier than age 39 if at higher risk for colorectal cancer. Also, an individualized decision between you and your healthcare provider will decide whether screening between the ages of 77-80 would be appropriate. Colonoscopy: 09/20/2021  FOBT/FIT: Not on file  Cologuard: Not on file  Sigmoidoscopy: Not on file          Breast Cancer Screening Age: 36 years old  Frequency: every 1-2 years  Not required if history of left and right mastectomy Mammogram: 05/10/2022    Screening Current   Cervical Cancer Screening Between the ages of 21-29, pap smear recommended once every 3 years. Between the ages of 32-69, can perform pap smear with HPV co-testing every 5 years.    Recommendations may differ for women with a history of total hysterectomy, cervical cancer, or abnormal pap smears in past. Pap Smear: Not on file    Screening Not Indicated   Hepatitis C Screening Once for adults born between 1945 and 1965  More frequently in patients at high risk for Hepatitis C Hep C Antibody: Not on file        Diabetes Screening 1-2 times per year if you're at risk for diabetes or have pre-diabetes Fasting glucose: 86 mg/dL (9/12/2022)  A1C: 5.4 % (8/13/2022)  Screening Current   Cholesterol Screening Once every 5 years if you don't have a lipid disorder. May order more often based on risk factors. Lipid panel: 09/19/2022    Screening Not Indicated  History Lipid Disorder     Other Preventive Screenings Covered by Medicare:  1. Abdominal Aortic Aneurysm (AAA) Screening: covered once if your at risk. You're considered to be at risk if you have a family history of AAA. 2. Lung Cancer Screening: covers low dose CT scan once per year if you meet all of the following conditions: (1) Age 48-67; (2) No signs or symptoms of lung cancer; (3) Current smoker or have quit smoking within the last 15 years; (4) You have a tobacco smoking history of at least 20 pack years (packs per day multiplied by number of years you smoked); (5) You get a written order from a healthcare provider. 3. Glaucoma Screening: covered annually if you're considered high risk: (1) You have diabetes OR (2) Family history of glaucoma OR (3)  aged 48 and older OR (3)  American aged 72 and older  3. Osteoporosis Screening: covered every 2 years if you meet one of the following conditions: (1) You're estrogen deficient and at risk for osteoporosis based off medical history and other findings; (2) Have a vertebral abnormality; (3) On glucocorticoid therapy for more than 3 months; (4) Have primary hyperparathyroidism; (5) On osteoporosis medications and need to assess response to drug therapy. · Last bone density test (DXA Scan): 12/21/2016.  5. HIV Screening: covered annually if you're between the age of 15-65. Also covered annually if you are younger than 13 and older than 72 with risk factors for HIV infection.  For pregnant patients, it is covered up to 3 times per pregnancy. Immunizations:  Immunization Recommendations   Influenza Vaccine Annual influenza vaccination during flu season is recommended for all persons aged >= 6 months who do not have contraindications   Pneumococcal Vaccine   * Pneumococcal conjugate vaccine = PCV13 (Prevnar 13), PCV15 (Vaxneuvance), PCV20 (Prevnar 20)  * Pneumococcal polysaccharide vaccine = PPSV23 (Pneumovax) Adults 20-63 years old: 1-3 doses may be recommended based on certain risk factors  Adults 72 years old: 1-2 doses may be recommended based off what pneumonia vaccine you previously received   Hepatitis B Vaccine 3 dose series if at intermediate or high risk (ex: diabetes, end stage renal disease, liver disease)   Tetanus (Td) Vaccine - COST NOT COVERED BY MEDICARE PART B Following completion of primary series, a booster dose should be given every 10 years to maintain immunity against tetanus. Td may also be given as tetanus wound prophylaxis. Tdap Vaccine - COST NOT COVERED BY MEDICARE PART B Recommended at least once for all adults. For pregnant patients, recommended with each pregnancy. Shingles Vaccine (Shingrix) - COST NOT COVERED BY MEDICARE PART B  2 shot series recommended in those aged 48 and above     Health Maintenance Due:      Topic Date Due   • Colorectal Cancer Screening  09/20/2022     Immunizations Due:      Topic Date Due   • COVID-19 Vaccine (4 - Moderna series) 01/09/2023   • Influenza Vaccine (1) 09/01/2023     Advance Directives   What are advance directives? Advance directives are legal documents that state your wishes and plans for medical care. These plans are made ahead of time in case you lose your ability to make decisions for yourself. Advance directives can apply to any medical decision, such as the treatments you want, and if you want to donate organs. What are the types of advance directives? There are many types of advance directives, and each state has rules about how to use them.  You may choose a combination of any of the following:  · Living will: This is a written record of the treatment you want. You can also choose which treatments you do not want, which to limit, and which to stop at a certain time. This includes surgery, medicine, IV fluid, and tube feedings. · Durable power of  for healthcare [facility]): This is a written record that states who you want to make healthcare choices for you when you are unable to make them for yourself. This person, called a proxy, is usually a family member or a friend. You may choose more than 1 proxy. · Do not resuscitate (DNR) order:  A DNR order is used in case your heart stops beating or you stop breathing. It is a request not to have certain forms of treatment, such as CPR. A DNR order may be included in other types of advance directives. · Medical directive: This covers the care that you want if you are in a coma, near death, or unable to make decisions for yourself. You can list the treatments you want for each condition. Treatment may include pain medicine, surgery, blood transfusions, dialysis, IV or tube feedings, and a ventilator (breathing machine). · Values history: This document has questions about your views, beliefs, and how you feel and think about life. This information can help others choose the care that you would choose. Why are advance directives important? An advance directive helps you control your care. Although spoken wishes may be used, it is better to have your wishes written down. Spoken wishes can be misunderstood, or not followed. Treatments may be given even if you do not want them. An advance directive may make it easier for your family to make difficult choices about your care. Fall Prevention    Fall prevention  includes ways to make your home and other areas safer. It also includes ways you can move more carefully to prevent a fall.  Health conditions that cause changes in your blood pressure, vision, or muscle strength and coordination may increase your risk for falls. Medicines may also increase your risk for falls if they make you dizzy, weak, or sleepy. Fall prevention tips:   · Stand or sit up slowly. · Use assistive devices as directed. · Wear shoes that fit well and have soles that . · Wear a personal alarm. · Stay active. · Manage your medical conditions. Home Safety Tips:  · Add items to prevent falls in the bathroom. · Keep paths clear. · Install bright lights in your home. · Keep items you use often on shelves within reach. · Paint or place reflective tape on the edges of your stairs. Weight Management   Why it is important to manage your weight:  Being overweight increases your risk of health conditions such as heart disease, high blood pressure, type 2 diabetes, and certain types of cancer. It can also increase your risk for osteoarthritis, sleep apnea, and other respiratory problems. Aim for a slow, steady weight loss. Even a small amount of weight loss can lower your risk of health problems. How to lose weight safely:  A safe and healthy way to lose weight is to eat fewer calories and get regular exercise. You can lose up about 1 pound a week by decreasing the number of calories you eat by 500 calories each day. Healthy meal plan for weight management:  A healthy meal plan includes a variety of foods, contains fewer calories, and helps you stay healthy. A healthy meal plan includes the following:  · Eat whole-grain foods more often. A healthy meal plan should contain fiber. Fiber is the part of grains, fruits, and vegetables that is not broken down by your body. Whole-grain foods are healthy and provide extra fiber in your diet. Some examples of whole-grain foods are whole-wheat breads and pastas, oatmeal, brown rice, and bulgur. · Eat a variety of vegetables every day. Include dark, leafy greens such as spinach, kale, noah greens, and mustard greens. Eat yellow and orange vegetables such as carrots, sweet potatoes, and winter squash. · Eat a variety of fruits every day. Choose fresh or canned fruit (canned in its own juice or light syrup) instead of juice. Fruit juice has very little or no fiber. · Eat low-fat dairy foods. Drink fat-free (skim) milk or 1% milk. Eat fat-free yogurt and low-fat cottage cheese. Try low-fat cheeses such as mozzarella and other reduced-fat cheeses. · Choose meat and other protein foods that are low in fat. Choose beans or other legumes such as split peas or lentils. Choose fish, skinless poultry (chicken or turkey), or lean cuts of red meat (beef or pork). Before you cook meat or poultry, cut off any visible fat. · Use less fat and oil. Try baking foods instead of frying them. Add less fat, such as margarine, sour cream, regular salad dressing and mayonnaise to foods. Eat fewer high-fat foods. Some examples of high-fat foods include french fries, doughnuts, ice cream, and cakes. · Eat fewer sweets. Limit foods and drinks that are high in sugar. This includes candy, cookies, regular soda, and sweetened drinks. Exercise:  Exercise at least 30 minutes per day on most days of the week. Some examples of exercise include walking, biking, dancing, and swimming. You can also fit in more physical activity by taking the stairs instead of the elevator or parking farther away from stores. Ask your healthcare provider about the best exercise plan for you. © Copyright [employer] 2018 Information is for End User's use only and may not be sold, redistributed or otherwise used for commercial purposes.  All illustrations and images included in CareNotes® are the copyrighted property of [employer] or  [employer]

## 2023-08-08 RX ORDER — ROSUVASTATIN CALCIUM 5 MG/1
5 TABLET, COATED ORAL DAILY
Qty: 30 TABLET | Refills: 5 | Status: SHIPPED | OUTPATIENT
Start: 2023-08-08

## 2023-08-10 ENCOUNTER — REMOTE DEVICE CLINIC VISIT (OUTPATIENT)
Dept: CARDIOLOGY CLINIC | Facility: CLINIC | Age: 83
End: 2023-08-10
Payer: MEDICARE

## 2023-08-10 DIAGNOSIS — Z95.0 CARDIAC PACEMAKER IN SITU: Primary | ICD-10-CM

## 2023-08-10 PROCEDURE — 93294 REM INTERROG EVL PM/LDLS PM: CPT | Performed by: INTERNAL MEDICINE

## 2023-08-10 PROCEDURE — 93296 REM INTERROG EVL PM/IDS: CPT | Performed by: INTERNAL MEDICINE

## 2023-08-10 NOTE — PROGRESS NOTES
Results for orders placed or performed in visit on 08/10/23   Cardiac EP device report    Narrative    MDT-SINGLE CHAMBER PPM / NOT MRI CONDITIONAL  CARELINK TRANSMISSION: BATTERY VOLTAGE ADEQUATE (8.8 YRS).  77.8% (>40%/VVIR 60); ALL AVAILABLE LEAD PARAMETERS WITHIN NORMAL LIMITS. NO SIGNIFICANT HIGH RATE EPISODES. NORMAL DEVICE FUNCTION.   ES to ED with c/o headache x 1 month. worse last week and went away for 3-4 days and came back stronger today. patient states she is 10 weeks pregnant. . denies visual changes. denies hx of pre-eclampsia.

## 2023-08-14 ENCOUNTER — CLINICAL SUPPORT (OUTPATIENT)
Dept: INTERNAL MEDICINE CLINIC | Facility: CLINIC | Age: 83
End: 2023-08-14

## 2023-08-14 DIAGNOSIS — E53.8 B12 DEFICIENCY: Chronic | ICD-10-CM

## 2023-08-14 PROCEDURE — 96372 THER/PROPH/DIAG INJ SC/IM: CPT | Performed by: INTERNAL MEDICINE

## 2023-08-14 RX ADMIN — CYANOCOBALAMIN 1000 MCG: 1000 INJECTION, SOLUTION INTRAMUSCULAR; SUBCUTANEOUS at 13:38

## 2023-08-14 NOTE — PROGRESS NOTES
Patient in office today for Vit B12 injection. Patient supplied the serum and 1.0 ml given in left deltoid.      ndc- 67158-944-17  Lot-   Ex- august 2024

## 2023-08-15 ENCOUNTER — OFFICE VISIT (OUTPATIENT)
Dept: NEUROLOGY | Facility: CLINIC | Age: 83
End: 2023-08-15
Payer: MEDICARE

## 2023-08-15 VITALS
SYSTOLIC BLOOD PRESSURE: 159 MMHG | HEART RATE: 89 BPM | HEIGHT: 63 IN | BODY MASS INDEX: 25.16 KG/M2 | WEIGHT: 142 LBS | DIASTOLIC BLOOD PRESSURE: 69 MMHG

## 2023-08-15 DIAGNOSIS — Z86.73 HISTORY OF CVA (CEREBROVASCULAR ACCIDENT): Primary | ICD-10-CM

## 2023-08-15 DIAGNOSIS — G47.33 OSA (OBSTRUCTIVE SLEEP APNEA): ICD-10-CM

## 2023-08-15 DIAGNOSIS — I77.9 BILATERAL CAROTID ARTERY DISEASE (HCC): ICD-10-CM

## 2023-08-15 DIAGNOSIS — I10 ESSENTIAL HYPERTENSION: ICD-10-CM

## 2023-08-15 DIAGNOSIS — I48.20 CHRONIC ATRIAL FIBRILLATION (HCC): ICD-10-CM

## 2023-08-15 DIAGNOSIS — E78.5 HYPERLIPIDEMIA LDL GOAL <100: ICD-10-CM

## 2023-08-15 DIAGNOSIS — I69.398 SPASTICITY AS LATE EFFECT OF CEREBROVASCULAR ACCIDENT (CVA): ICD-10-CM

## 2023-08-15 DIAGNOSIS — R25.2 SPASTICITY AS LATE EFFECT OF CEREBROVASCULAR ACCIDENT (CVA): ICD-10-CM

## 2023-08-15 PROCEDURE — 99214 OFFICE O/P EST MOD 30 MIN: CPT

## 2023-08-15 NOTE — PROGRESS NOTES
ROS:    Review of Systems   Constitutional: Negative for appetite change, fatigue and fever. HENT: Negative. Negative for hearing loss, tinnitus, trouble swallowing and voice change. Eyes: Negative. Negative for photophobia, pain and visual disturbance. Respiratory: Negative. Negative for shortness of breath. Cardiovascular: Negative. Negative for palpitations. Gastrointestinal: Negative. Negative for nausea and vomiting. Endocrine: Negative. Negative for cold intolerance. Genitourinary: Negative. Negative for dysuria, frequency and urgency. Musculoskeletal: Positive for back pain and neck pain. Negative for gait problem and myalgias. Skin: Negative. Negative for rash. Allergic/Immunologic: Negative. Neurological: Negative. Negative for dizziness, tremors, seizures, syncope, facial asymmetry, speech difficulty, weakness, light-headedness, numbness and headaches. Hematological: Negative. Does not bruise/bleed easily. Psychiatric/Behavioral: Negative. Negative for confusion, hallucinations and sleep disturbance. All other systems reviewed and are negative.

## 2023-08-15 NOTE — PROGRESS NOTES
Patient ID: Arvind Márquez is a 80 y.o. female who presents to the [address] [place] [address]. Assessment/Plan:    History of Cerebrovascular Accident:    I had the pleasure of seeing Liya Puckett today in the office at [facility] in [facility]. She is presenting today for a office visit follow-up appointment in regard to her history of CVA. At this point in time, the patient is still experiencing residual deficits in regards to left upper extremity and left lower extremity weakness. Patient also has some spasticity of the left lower extremity and also has a left foot drop at this time as well. No new strokelike symptoms reported at this visit. However, patient and her family would like to have a consult with physiatry again to have the patient looked at for potential Botox therapy in regards to her left lower extremity paresis/spasticity and chronic left foot drop. Patient did have a referral that was placed to physiatry previously, however it does not seem like this was ever filled. I am going to place another referral for the patient at this time that way she can see Dr. Mary Contreras and have a full evaluation for possible Botox treatment in the future.    -At this time, I would also like for the patient to have a repeat lipid panel. Patient's most recent lipid panel is back in August 2022 which revealed an LDL of 38 mg/dL, although the patient's LDL was perfectly fine at this time and has been a year since the last lipid panel so I would just like to repeat this and make sure that the patient's cholesterol has not significantly increased due to the fact that she is only on Crestor 5 mg at this moment in time.  -Would like for the patient to have a carotid artery Doppler, on the patient's most recent CTA head and neck it did show some mild atherosclerotic disease of the right ICA and the left ICA.   Would just like the patient to have this checked out to make sure there is not any significant flow-limiting stenosis or make sure that there is not any plaque or atherosclerotic disease that we have to be concerned with at this moment in time. As long as everything looks good and there is not a significant amount of plaque buildup, we will not have to monitor this on a routine basis. -Referral to physiatry placed, specifically placed to Dr. Satinder Peter for potential Botox therapy in regards to the patient's left lower extremity paresis/spasticity and chronic left foot drop. Patient has been wearing a brace for the foot drop which I encouraged her to still continue to use, however it was stated that the brace is very uncomfortable and makes it difficult for the patient to be able to ambulate with it at this time.  -Continue to follow with sleep medicine in regards to testing for potential sleep apnea and the need for a CPAP in the future.  -Continue to follow with cardiology in regards to the patient's atrial fibrillation, hypertension, pulmonary hypertension, and her work-up for potential cardiomyopathy due to amyloidosis. Will again defer blood pressure management to cardiology, however patient should try to ultimately stay around the range of 130/80 in regards to her blood pressure and should not be going much higher than this number.      - For ongoing stroke prevention continue: Pradaxa 150 mg twice daily, rosuvastatin 5 mg twice daily  - Discussed the importance of antiplatelet management with the patient to prevent future strokes. - Recommend to check blood pressure occasionally away from the doctor's office to make sure that those numbers are typically less than 130/80.   If they are frequently higher than that, we recommend checking a little more often and to follow up with primary care team   - Will defer to primary care team for monitoring of cholesterol panel and blood sugar numbers with target LDL cholesterol of less than 70 and hemoglobin A1c less than 7%  - Recommend following a low salt, mediterranean diet   - Recommend routine physical exercise as tolerated     We will plan for her to return to the office in 6 months time to see on of the APPs or Dr. Marcie Ellis but would be happy to see her sooner if the need should arise. If she has any symptoms concerning for TIA or stroke including sudden painless loss of vision or double vision, difficulty speaking or swallowing, vertigo/room spinning that does not quickly resolve, or weakness/numbness/loss of coordination affecting 1 side of the face or body she should proceed by ambulance to the nearest emergency room immediately. Subjective:    HPI      For Review:    Roxane Love was last seen in the office on 02/15/2023 by Jay Florian PA-C. This was an office visit follow-up to check in on the patient's status post CVA. Patient had previously presented to [address] [place] [address] on 08/12/2022 with contracted and weak left upper extremity, left lower extremity weakness, left facial droop, mild dysarthria and left-sided sensory deficits. Patient was previously on Xarelto at the time and was reportedly compliant. CTA showed near occlusive thrombus at the right MCA bifurcation as well as mild beading of the mid to distal ICA suspicious for mild fibromuscular dysplasia and also mild atherosclerotic disease at the bilateral distal carotid arteries. Patient was not deemed a tPA or thrombectomy candidate. Patient had a repeat CT head which showed subtle loss of gray-white matter differentiation in the right temporal lobe and insular cortex in keeping with right MCA territory infarction. There was concern for Xarelto failure therefore the patient was transitioned to Pradaxa. At her last visit with Sole Pérez, patient denied any new neurologic symptoms that would indicate a TIA/CVA. Patient had completed OT and was continuing with PT. She is making a lot of progress in her recovery. Patient still had left-sided weakness.   And was having some spasticity of the left lower extremity more specifically. Makayla Dash I provided the patient with a prescription of baclofen 5 mg to take at bedtime and also a referral to physiatry was placed to see if the patient would be candidate for lower left extremity Botox. Patient was compliant with Pradaxa, no issues with bleeding or bruising. She is continuing on her statin at that time. She was to continue to follow with cardiology in regards to A-fib management and PCP for her blood pressure, lipids and blood sugar. Patient was advised at the time to continue with Pradaxa 150 mg twice daily. She was to continue on her statin medication along with appropriate blood pressure and glycemic control. She was to continue to follow with cardiology for the management of A-fib. Continue with her physical therapy at this time. And referral was placed to physiatry in hopes that the patient could receive Botox. Still appears that at the day of this appointment for the patient, she has yet to see physiatry or receiving consultation. Interval History:      New stroke symptoms/residual symptoms:    Any new, sudden onset weakness, numbness, facial droop, slurred speech, difficulty speaking, trouble swallowing, persistent vertigo, or sudden double vision or vision loss? No new stroke like symptoms     Residual symptoms include: weakness of the left UE/LE: upper extremity and lower extremity    Stroke Etiology and Risk Factor modification: This was a(n) embolic stroke, most likely related to Cardioembolic: Non-Valvular A-Fib    Stroke risk factors were evaluated including: Atrial fibrillation, hypertension, hyperlipidemia    AP/AC therapy: [id number] mg twice daily. No bleeding or bruising concerns at this time     Statin therapy: Crestor 5 mg once daily    Blood pressure today and as of late:159/69 today, however blood pressure is usually around 729-083 systolic when she is at home she states.  Follows with cardiology for blood pressure management, cardiology has ordered variety of tests for her heart. Most recent LDL: 38 mg/dL    Most recent hemoglobin A1C: 5.4%     Cardiology evaluation? Any cardiac monitoring required?: She does have a cardiac pacemaker implant currently and follows with cardiology for this and management of atrial fibrillation. Endocrinology evaluation? Following proper glycemic treatment/diet? No endocrinology     Lifestyle history/modifications:    Diet/Exercise regimen: She has been eating very healthy, eating vegetables and fruit and getting well balanced diet. She does walk but not very active outside of that. Any physical therapy, occupational therapy or speech therapy performed/required at this time?: No PT/OT    Any difficulty with sleep? No issues with sleep, does wake up in the night but falls right back to sleep     Any history of sleep apnea apnea? CPAP compliance?: Repeating sleep study again in January for [facility]    Post-stroke depression/anxiety? No anxiety/depression    Any history of smoking? No smoking    Additional History:    Patient's daughter is at the appointment today helping translate for the patient. She states that the patient does have a brace for the left lower extremity to help with her mobility and foot drop that she currently has. Patient still noticing spasticity and foot drop of that lower left extremity. They have not seen physiatry, the referral is still in place but they stated that they have never received a phone call or an appointment was made. Talked with the patient and educated her daughter that I will be again placing a referral for the physiatry to have the patient seen for potential Botox. They are still hoping that Botox is something that they can pursue at this time for the patient.       Lab Results   Component Value Date/Time    CHOLESTEROL 100 08/13/2022 05:55 AM     Lab Results   Component Value Date/Time    TRIG 71 08/13/2022 05:55 AM    TRIG 123 11/25/2015 08:40 AM     Lab Results   Component Value Date/Time    HDL 48 (L) 08/13/2022 05:55 AM    HDL 48 11/25/2015 08:40 AM     Lab Results   Component Value Date/Time    LDLCALC 38 08/13/2022 05:55 AM    LDLCALC 70 11/25/2015 08:40 AM       Lab Results   Component Value Date/Time    HGBA1C 5.4 08/13/2022 05:55 AM    HGBA1C 5.8 (H) 07/24/2014 10:00 AM     Lab Results   Component Value Date/Time     08/13/2022 05:55 AM     07/24/2014 10:00 AM           Past Medical History:   Diagnosis Date   • A-fib ([address])    • Anemia    • Arthritis    • Breast pain, right    • Chest pain on breathing    • Colon polyp    • Cough    • Diverticulosis    • Encounter for screening colonoscopy    • H/O sick sinus syndrome    • Heart murmur    • High cholesterol    • History of atrial fibrillation     Rate ontrolled. On xarelto 15mg (creatinine 50) Followed by Dr. Samira Dean with Cardiology    • History of weight loss     Report loose fitting clothes. Weight stable (3lbs difference). Last colo showed small tubular adenoma x2. Breast biopsy last showed fibrocystic changes. TSH wnl. Will check cbc, bmp, spep.        • Hx of long term use of blood thinners    • Hypertension    • Irregular heart beat    • RCIH (obstructive sleep apnea)    • Pacemaker    • Preop examination    • Shortness of breath    • Viral bronchitis        Current Outpatient Medications:   •  cyanocobalamin 1,000 mcg/mL, INJECT 1 ML (1,000 MCG TOTAL) INTO A MUSCLE EVERY 30 (THIRTY) DAYS, Disp: 3 mL, Rfl: 0  •  furosemide (LASIX) 20 mg tablet, Take 2 tablets (40 mg total) by mouth daily, Disp: 90 tablet, Rfl: 3  •  Incontinence Supply Disposable (RA Adult Wipes) MISC, Use 4 (four) times a day, Disp: 64 each, Rfl: 10  •  losartan (COZAAR) 25 mg tablet, Take 1 tablet (25 mg total) by mouth daily Take at same time as 50mg tablet for total of 75mg daily, Disp: 90 tablet, Rfl: 5  •  losartan (COZAAR) 50 mg tablet, TAKE 1 TABLET (50 MG TOTAL) BY MOUTH DAILY, Disp: 90 tablet, Rfl: 3  •  melatonin 3 mg, Take 1 tablet (3 mg total) by mouth every evening, Disp: 30 tablet, Rfl: 0  •  menthol-methyl salicylate (BENGAY) 83-50 % cream, Apply topically 2 (two) times a day, Disp: , Rfl: 0  •  nebivolol (BYSTOLIC) 5 mg tablet, Take 1 tablet (5 mg total) by mouth daily, Disp: 90 tablet, Rfl: 3  •  pantoprazole (PROTONIX) 40 mg tablet, TAKE 1 TABLET (40 MG TOTAL) BY MOUTH DAILY IN THE EARLY MORNING, Disp: 90 tablet, Rfl: 3  •  potassium chloride (K-DUR,KLOR-CON) 10 mEq tablet, Take 2 tablets (20 mEq total) by mouth daily, Disp: 180 tablet, Rfl: 3  •  Pradaxa 150 MG capsu, TAKE 1 CAPSULE (150 MG TOTAL) BY MOUTH 2 (TWO) TIMES A DAY, Disp: 180 capsule, Rfl: 3  •  rosuvastatin (CRESTOR) 5 mg tablet, Take 1 tablet (5 mg total) by mouth daily, Disp: 30 tablet, Rfl: 5  •  verapamil (CALAN-SR) 240 mg CR tablet, Take 1 tablet (240 mg total) by mouth daily at bedtime, Disp: 90 tablet, Rfl: 3  •  acetaminophen (TYLENOL) 325 mg tablet, Take 2 tablets (650 mg total) by mouth every 6 (six) hours as needed for mild pain (Patient not taking: Reported on 8/15/2023), Disp: , Rfl: 0  •  ammonium lactate (LAC-HYDRIN) 12 % cream, Apply topically as needed for dry skin (Patient not taking: Reported on 8/15/2023), Disp: 385 g, Rfl: 1  •  Blood Pressure Monitoring (Blood Pressure Cuff) MISC, Use every other day For HTN (Patient not taking: Reported on 8/15/2023), Disp: 1 each, Rfl: 0  •  nitroglycerin (NITROSTAT) 0.4 mg SL tablet, Place 1 tablet (0.4 mg total) under the tongue every 5 (five) minutes as needed for chest pain (Patient not taking: Reported on 8/3/2023), Disp: , Rfl: 0    Current Facility-Administered Medications:   •  cyanocobalamin injection 1,000 mcg, 1,000 mcg, Intramuscular, Q30 Days, Adal Acosta MD, 1,000 mcg at 08/14/23 1338     Objective:    Physical Exam:                                                                 Vitals:            Constitutional:    /69 (BP Location: Right arm, Patient Position: Sitting, Cuff Size: Standard)   Pulse 89   Ht 5' 3" (1.6 m)   Wt 64.4 kg (142 lb)   BMI 25.15 kg/m²   BP Readings from Last 3 Encounters:   08/15/23 159/69   08/07/23 160/90   08/07/23 158/72     Pulse Readings from Last 3 Encounters:   08/15/23 89   08/07/23 85   08/07/23 84         Well developed, well nourished, well groomed. No dysmorphic features. Psychiatric:  Normal behavior and appropriate affect        Neurological Examination:     Mental status/cognitive function:   Orientated to time, place and person. Recent and remote memory intact. Attention span and concentration as well as fund of knowledge are appropriate for age. Normal language and spontaneous speech. Cranial Nerves:  II-visual fields full. III, IV, VI-Pupils were equal, round, and reactive to light and accomodation. Extraocular movements were full and conjugate without nystagmus. Conjugate gaze, normal smooth pursuits, normal saccades   V-facial sensation symmetric. VII-facial expression symmetric, intact forehead wrinkle, strong eye closure, symmetric smile    VIII-hearing grossly intact bilaterally   IX, X-palate elevation symmetric, no dysarthria. XI-shoulder shrug strength intact    XII-tongue protrusion midline. Motor Exam: Increased bulk and tone of the left lower extremity at this time. Power/strength 5/5 of the right upper and lower extremities, power/strength 4/5 of the left upper and lower extremities, spasticity of the left lower extremity with left foot drop. Sensory: grossly intact light touch in all extremities. Reflexes: Right: brachioradialis 2+, biceps 2+, knee 2+  Left: brachioradialis 3+, biceps 3+, knee 3+  Coordination: Finger nose finger intact bilaterally, no apparent dysmetria, ataxia or tremor noted  Gait: Uses a walker at this time, slightly unsteady gait. ROS:    Review of Systems   Constitutional: Negative for appetite change, fatigue and fever. HENT: Negative. Negative for hearing loss, tinnitus, trouble swallowing and voice change. Eyes: Negative. Negative for photophobia, pain and visual disturbance. Respiratory: Negative. Negative for shortness of breath. Cardiovascular: Negative. Negative for palpitations. Gastrointestinal: Negative. Negative for nausea and vomiting. Endocrine: Negative. Negative for cold intolerance. Genitourinary: Negative. Negative for dysuria, frequency and urgency. Musculoskeletal: Positive for back pain and neck pain. Negative for gait problem and myalgias. Skin: Negative. Negative for rash. Allergic/Immunologic: Negative. Neurological: Negative. Negative for dizziness, tremors, seizures, syncope, facial asymmetry, speech difficulty, weakness, light-headedness, numbness and headaches. Hematological: Negative. Does not bruise/bleed easily. Psychiatric/Behavioral: Negative. Negative for confusion, hallucinations and sleep disturbance. All other systems reviewed and are negative.           I have spent 35 minutes today on this case including chart review, performing history and exam, patient counseling, and documentation/communication      Hugh Orozco PA-C  8/15/2023 9:20 AM

## 2023-08-15 NOTE — PATIENT INSTRUCTIONS
History of Cerebrovascular Accident:    I had the pleasure of seeing Sirisha Loyola today in the office at [facility] in [facility]. She is presenting today for a office visit follow-up appointment in regard to her history of CVA. At this point in time, the patient is still experiencing residual deficits in regards to left upper extremity and left lower extremity weakness. Patient also has some spasticity of the left lower extremity and also has a left foot drop at this time as well. No new strokelike symptoms reported at this visit. However, patient and her family would like to have a consult with physiatry again to have the patient looked at for potential Botox therapy in regards to her left lower extremity paresis/spasticity and chronic left foot drop. Patient did have a referral that was placed to physiatry previously, however it does not seem like this was ever filled. I am going to place another referral for the patient at this time that way she can see Dr. Mohit Yung and have a full evaluation for possible Botox treatment in the future.    -At this time, I would also like for the patient to have a repeat lipid panel. Patient's most recent lipid panel is back in August 2022 which revealed an LDL of 38 mg/dL, although the patient's LDL was perfectly fine at this time and has been a year since the last lipid panel so I would just like to repeat this and make sure that the patient's cholesterol has not significantly increased due to the fact that she is only on Crestor 5 mg at this moment in time.  -Would like for the patient to have a carotid artery Doppler, on the patient's most recent CTA head and neck it did show some mild atherosclerotic disease of the right ICA and the left ICA.   Would just like the patient to have this checked out to make sure there is not any significant flow-limiting stenosis or make sure that there is not any plaque or atherosclerotic disease that we have to be concerned with at this moment in time. As long as everything looks good and there is not a significant amount of plaque buildup, we will not have to monitor this on a routine basis. -Referral to physiatry placed, specifically placed to Dr. Salome Miranda for potential Botox therapy in regards to the patient's left lower extremity paresis/spasticity and chronic left foot drop. Patient has been wearing a brace for the foot drop which I encouraged her to still continue to use, however it was stated that the brace is very uncomfortable and makes it difficult for the patient to be able to ambulate with it at this time.  -Continue to follow with sleep medicine in regards to testing for potential sleep apnea and the need for a CPAP in the future.  -Continue to follow with cardiology in regards to the patient's atrial fibrillation, hypertension, pulmonary hypertension, and her work-up for potential cardiomyopathy due to amyloidosis. Will again defer blood pressure management to cardiology, however patient should try to ultimately stay around the range of 130/80 in regards to her blood pressure and should not be going much higher than this number.      - For ongoing stroke prevention continue: Pradaxa 150 mg twice daily, rosuvastatin 5 mg twice daily  - Discussed the importance of antiplatelet management with the patient to prevent future strokes. - Recommend to check blood pressure occasionally away from the doctor's office to make sure that those numbers are typically less than 130/80.   If they are frequently higher than that, we recommend checking a little more often and to follow up with primary care team   - Will defer to primary care team for monitoring of cholesterol panel and blood sugar numbers with target LDL cholesterol of less than 70 and hemoglobin A1c less than 7%  - Recommend following a low salt, mediterranean diet   - Recommend routine physical exercise as tolerated     We will plan for her to return to the office in 6 months time to see on of the APPs or Dr. Odell Essex but would be happy to see her sooner if the need should arise. If she has any symptoms concerning for TIA or stroke including sudden painless loss of vision or double vision, difficulty speaking or swallowing, vertigo/room spinning that does not quickly resolve, or weakness/numbness/loss of coordination affecting 1 side of the face or body she should proceed by ambulance to the nearest emergency room immediately.

## 2023-08-17 ENCOUNTER — HOSPITAL ENCOUNTER (OUTPATIENT)
Dept: NON INVASIVE DIAGNOSTICS | Facility: CLINIC | Age: 83
Discharge: HOME | End: 2023-08-17
Payer: MEDICARE

## 2023-08-17 VITALS
SYSTOLIC BLOOD PRESSURE: 159 MMHG | DIASTOLIC BLOOD PRESSURE: 69 MMHG | HEART RATE: 65 BPM | BODY MASS INDEX: 25.16 KG/M2 | HEIGHT: 63 IN | WEIGHT: 142 LBS

## 2023-08-17 DIAGNOSIS — I27.20 PULMONARY HYPERTENSION (HCC): ICD-10-CM

## 2023-08-17 DIAGNOSIS — I48.20 CHRONIC ATRIAL FIBRILLATION (HCC): ICD-10-CM

## 2023-08-17 LAB
AORTIC ROOT: 3.3 CM
AORTIC VALVE MEAN VELOCITY: 13.3 M/S
APICAL FOUR CHAMBER EJECTION FRACTION: 68 %
ASCENDING AORTA: 3.5 CM
AV AREA BY CONTINUOUS VTI: 1.5 CM2
AV AREA PEAK VELOCITY: 1.3 CM2
AV LVOT MEAN GRADIENT: 2 MMHG
AV LVOT PEAK GRADIENT: 3 MMHG
AV MEAN GRADIENT: 8 MMHG
AV PEAK GRADIENT: 16 MMHG
AV REGURGITATION PRESSURE HALF TIME: 549 MS
AV VALVE AREA: 1.49 CM2
AV VELOCITY RATIO: 0.46
DOP CALC AO PEAK VEL: 2.01 M/S
DOP CALC AO VTI: 46.1 CM
DOP CALC LVOT AREA: 2.83 CM2
DOP CALC LVOT CARDIAC INDEX: 2.54 L/MIN/M2
DOP CALC LVOT CARDIAC OUTPUT: 4.24 L/MIN
DOP CALC LVOT DIAMETER: 1.9 CM
DOP CALC LVOT PEAK VEL VTI: 24.23 CM
DOP CALC LVOT PEAK VEL: 0.93 M/S
DOP CALC LVOT STROKE INDEX: 42.5 ML/M2
DOP CALC LVOT STROKE VOLUME: 68.66 CM3
FRACTIONAL SHORTENING: 32 % (ref 28–44)
INTERVENTRICULAR SEPTUM IN DIASTOLE (PARASTERNAL SHORT AXIS VIEW): 1.1 CM
INTERVENTRICULAR SEPTUM: 1.1 CM (ref 0.6–1.1)
LAAS-AP2: 29.9 CM2
LAAS-AP4: 29.8 CM2
LEFT ATRIUM AREA SYSTOLE SINGLE PLANE A4C: 28.5 CM2
LEFT ATRIUM SIZE: 5.8 CM
LEFT ATRIUM VOLUME (MOD BIPLANE): 102 ML
LEFT INTERNAL DIMENSION IN SYSTOLE: 2.8 CM (ref 2.1–4)
LEFT VENTRICLE DIASTOLIC VOLUME (MOD BIPLANE): 95 ML
LEFT VENTRICLE SYSTOLIC VOLUME (MOD BIPLANE): 38 ML
LEFT VENTRICULAR INTERNAL DIMENSION IN DIASTOLE: 4.1 CM (ref 3.5–6)
LEFT VENTRICULAR POSTERIOR WALL IN END DIASTOLE: 1.1 CM
LEFT VENTRICULAR STROKE VOLUME: 47 ML
LV EF: 60 %
LVSV (TEICH): 47 ML
RA PRESSURE ESTIMATED: 15 MMHG
RIGHT ATRIUM AREA SYSTOLE A4C: 15.3 CM2
RIGHT VENTRICLE ID DIMENSION: 4.2 CM
RV PSP: 58 MMHG
SL CV AV DECELERATION TIME RETROGRADE: 1893 MS
SL CV AV PEAK GRADIENT RETROGRADE: 47 MMHG
SL CV LEFT ATRIUM LENGTH A2C: 7.3 CM
SL CV LV EF: 60
SL CV PED ECHO LEFT VENTRICLE DIASTOLIC VOLUME (MOD BIPLANE) 2D: 75 ML
SL CV PED ECHO LEFT VENTRICLE SYSTOLIC VOLUME (MOD BIPLANE) 2D: 29 ML
TR MAX PG: 43 MMHG
TR PEAK VELOCITY: 3.3 M/S
TRICUSPID ANNULAR PLANE SYSTOLIC EXCURSION: 1.7 CM
TRICUSPID VALVE PEAK REGURGITATION VELOCITY: 3.28 M/S

## 2023-08-17 PROCEDURE — 93306 TTE W/DOPPLER COMPLETE: CPT

## 2023-08-17 PROCEDURE — 93306 TTE W/DOPPLER COMPLETE: CPT | Performed by: INTERNAL MEDICINE

## 2023-08-30 ENCOUNTER — APPOINTMENT (OUTPATIENT)
Dept: LAB | Facility: CLINIC | Age: 83
End: 2023-08-30
Payer: MEDICARE

## 2023-08-30 DIAGNOSIS — I27.20 PULMONARY HYPERTENSION (HCC): ICD-10-CM

## 2023-08-30 DIAGNOSIS — E53.8 B12 DEFICIENCY: Chronic | ICD-10-CM

## 2023-08-30 DIAGNOSIS — E78.5 HYPERLIPIDEMIA LDL GOAL <100: ICD-10-CM

## 2023-08-30 DIAGNOSIS — Z86.73 HISTORY OF CVA (CEREBROVASCULAR ACCIDENT): ICD-10-CM

## 2023-08-30 LAB
ALBUMIN SERPL BCP-MCNC: 3.7 G/DL (ref 3.5–5)
ALP SERPL-CCNC: 82 U/L (ref 34–104)
ALT SERPL W P-5'-P-CCNC: 7 U/L (ref 7–52)
ANION GAP SERPL CALCULATED.3IONS-SCNC: 7 MMOL/L
AST SERPL W P-5'-P-CCNC: 16 U/L (ref 13–39)
BASOPHILS # BLD AUTO: 0.05 THOUSANDS/ÂΜL (ref 0–0.1)
BASOPHILS NFR BLD AUTO: 1 % (ref 0–1)
BILIRUB SERPL-MCNC: 0.61 MG/DL (ref 0.2–1)
BNP SERPL-MCNC: 151 PG/ML (ref 0–100)
BUN SERPL-MCNC: 20 MG/DL (ref 5–25)
CALCIUM SERPL-MCNC: 9.1 MG/DL (ref 8.4–10.2)
CHLORIDE SERPL-SCNC: 108 MMOL/L (ref 96–108)
CHOLEST SERPL-MCNC: 118 MG/DL
CO2 SERPL-SCNC: 24 MMOL/L (ref 21–32)
CREAT SERPL-MCNC: 1.2 MG/DL (ref 0.6–1.3)
EOSINOPHIL # BLD AUTO: 0.11 THOUSAND/ÂΜL (ref 0–0.61)
EOSINOPHIL NFR BLD AUTO: 2 % (ref 0–6)
ERYTHROCYTE [DISTWIDTH] IN BLOOD BY AUTOMATED COUNT: 13.7 % (ref 11.6–15.1)
GFR SERPL CREATININE-BSD FRML MDRD: 41 ML/MIN/1.73SQ M
GLUCOSE P FAST SERPL-MCNC: 96 MG/DL (ref 65–99)
HCT VFR BLD AUTO: 36.4 % (ref 34.8–46.1)
HDLC SERPL-MCNC: 52 MG/DL
HGB BLD-MCNC: 11.3 G/DL (ref 11.5–15.4)
IMM GRANULOCYTES # BLD AUTO: 0.03 THOUSAND/UL (ref 0–0.2)
IMM GRANULOCYTES NFR BLD AUTO: 0 % (ref 0–2)
LDLC SERPL CALC-MCNC: 50 MG/DL (ref 0–100)
LYMPHOCYTES # BLD AUTO: 1.31 THOUSANDS/ÂΜL (ref 0.6–4.47)
LYMPHOCYTES NFR BLD AUTO: 19 % (ref 14–44)
MCH RBC QN AUTO: 30.4 PG (ref 26.8–34.3)
MCHC RBC AUTO-ENTMCNC: 31 G/DL (ref 31.4–37.4)
MCV RBC AUTO: 98 FL (ref 82–98)
MONOCYTES # BLD AUTO: 0.56 THOUSAND/ÂΜL (ref 0.17–1.22)
MONOCYTES NFR BLD AUTO: 8 % (ref 4–12)
NEUTROPHILS # BLD AUTO: 4.68 THOUSANDS/ÂΜL (ref 1.85–7.62)
NEUTS SEG NFR BLD AUTO: 70 % (ref 43–75)
NRBC BLD AUTO-RTO: 0 /100 WBCS
PLATELET # BLD AUTO: 188 THOUSANDS/UL (ref 149–390)
PMV BLD AUTO: 11 FL (ref 8.9–12.7)
POTASSIUM SERPL-SCNC: 3.8 MMOL/L (ref 3.5–5.3)
PROT SERPL-MCNC: 7.4 G/DL (ref 6.4–8.4)
RBC # BLD AUTO: 3.72 MILLION/UL (ref 3.81–5.12)
SODIUM SERPL-SCNC: 139 MMOL/L (ref 135–147)
T4 FREE SERPL-MCNC: 0.87 NG/DL (ref 0.61–1.12)
TRIGL SERPL-MCNC: 78 MG/DL
TSH SERPL DL<=0.05 MIU/L-ACNC: 5.78 UIU/ML (ref 0.45–4.5)
VIT B12 SERPL-MCNC: 574 PG/ML (ref 180–914)
WBC # BLD AUTO: 6.74 THOUSAND/UL (ref 4.31–10.16)

## 2023-08-30 PROCEDURE — 83880 ASSAY OF NATRIURETIC PEPTIDE: CPT

## 2023-08-30 PROCEDURE — 84165 PROTEIN E-PHORESIS SERUM: CPT

## 2023-08-30 PROCEDURE — 86334 IMMUNOFIX E-PHORESIS SERUM: CPT

## 2023-08-30 PROCEDURE — 82607 VITAMIN B-12: CPT

## 2023-08-30 PROCEDURE — 83521 IG LIGHT CHAINS FREE EACH: CPT

## 2023-08-30 PROCEDURE — 36415 COLL VENOUS BLD VENIPUNCTURE: CPT

## 2023-08-30 PROCEDURE — 80061 LIPID PANEL: CPT

## 2023-08-31 ENCOUNTER — HOSPITAL ENCOUNTER (OUTPATIENT)
Dept: RADIOLOGY | Facility: HOSPITAL | Age: 83
Discharge: HOME | End: 2023-08-31
Attending: STUDENT IN AN ORGANIZED HEALTH CARE EDUCATION/TRAINING PROGRAM
Payer: MEDICARE

## 2023-08-31 DIAGNOSIS — I51.7 LVH (LEFT VENTRICULAR HYPERTROPHY): ICD-10-CM

## 2023-08-31 LAB
ALBUMIN SERPL ELPH-MCNC: 3.66 G/DL (ref 3.2–5.1)
ALBUMIN SERPL ELPH-MCNC: 51.6 % (ref 48–70)
ALBUMIN UR ELPH-MCNC: 100 %
ALPHA1 GLOB MFR UR ELPH: 0 %
ALPHA1 GLOB SERPL ELPH-MCNC: 0.32 G/DL (ref 0.15–0.47)
ALPHA1 GLOB SERPL ELPH-MCNC: 4.5 % (ref 1.8–7)
ALPHA2 GLOB MFR UR ELPH: 0 %
ALPHA2 GLOB SERPL ELPH-MCNC: 0.75 G/DL (ref 0.42–1.04)
ALPHA2 GLOB SERPL ELPH-MCNC: 10.5 % (ref 5.9–14.9)
B-GLOBULIN MFR UR ELPH: 0 %
BETA GLOB ABNORMAL SERPL ELPH-MCNC: 0.36 G/DL (ref 0.31–0.57)
BETA1 GLOB SERPL ELPH-MCNC: 5.1 % (ref 4.7–7.7)
BETA2 GLOB SERPL ELPH-MCNC: 5 % (ref 3.1–7.9)
BETA2+GAMMA GLOB SERPL ELPH-MCNC: 0.36 G/DL (ref 0.2–0.58)
GAMMA GLOB ABNORMAL SERPL ELPH-MCNC: 1.65 G/DL (ref 0.4–1.66)
GAMMA GLOB MFR UR ELPH: 0 %
GAMMA GLOB SERPL ELPH-MCNC: 23.3 % (ref 6.9–22.3)
IGG/ALB SER: 1.07 {RATIO} (ref 1.1–1.8)
INTERPRETATION UR IFE-IMP: NORMAL
KAPPA LC FREE SER-MCNC: 51.1 MG/L (ref 3.3–19.4)
KAPPA LC FREE/LAMBDA FREE SER: 2.22 {RATIO} (ref 0.26–1.65)
LAMBDA LC FREE SERPL-MCNC: 23 MG/L (ref 5.7–26.3)
PROT PATTERN SERPL ELPH-IMP: ABNORMAL
PROT PATTERN UR ELPH-IMP: NORMAL
PROT SERPL-MCNC: 7.1 G/DL (ref 6.4–8.4)
PROT UR-MCNC: 34.5 MG/DL

## 2023-08-31 PROCEDURE — 78800 RP LOCLZJ TUM 1 AREA 1 D IMG: CPT

## 2023-08-31 PROCEDURE — 86334 IMMUNOFIX E-PHORESIS SERUM: CPT | Performed by: PATHOLOGY

## 2023-08-31 PROCEDURE — G1004 CDSM NDSC: HCPCS

## 2023-08-31 PROCEDURE — 84165 PROTEIN E-PHORESIS SERUM: CPT | Performed by: PATHOLOGY

## 2023-08-31 PROCEDURE — A9538 TC99M PYROPHOSPHATE: HCPCS

## 2023-08-31 PROCEDURE — 84166 PROTEIN E-PHORESIS/URINE/CSF: CPT | Performed by: PATHOLOGY

## 2023-09-01 DIAGNOSIS — I27.20 PULMONARY HYPERTENSION (HCC): ICD-10-CM

## 2023-09-05 RX ORDER — FUROSEMIDE 20 MG/1
40 TABLET ORAL DAILY
Qty: 90 TABLET | Refills: 3 | Status: SHIPPED | OUTPATIENT
Start: 2023-09-05

## 2023-09-05 NOTE — PROGRESS NOTES
Advanced Heart Failure/Pulmonary Hypertension Outpatient Note - Jc Emerson 80 y.o. female MRN: [id number]    @ Encounter: [id number]    Assessment:  80 y.o. female Hx per chart p/w HF fu.    Follows primarily with EP Dr. Kadie Lala  AF on [facility]  HFpEF, RV size/fxn wnl  PH. +RVOT notching. Significant group II contribution likely. Mild-mod AI  Mild AS  CVA with L hemiparesis  Tachybrady syndrome s/p VVI pacemaker. Per chart:  "She had left sided erosion remotely and lead extracted. Paces up to 76 % since they increased bystolic"  Nonobstructive CAD. Had [address] for borderline stress test 50% RCA and nonobstructive. HTN  CKD  Anemia  Vit B12 deficient and gets injections      I have reviewed all pertinent patient data including but not limited to:    Lab Results   Component Value Date    CREATININE 1.20 08/30/2023     Lab Results   Component Value Date    K 3.8 08/30/2023     Lab Results   Component Value Date    HGBA1C 5.4 08/13/2022     Lab Results   Component Value Date    JIB1ZSKEHJLS 5.777 (H) 08/30/2023     Lab Results   Component Value Date    LDLCALC 50 08/30/2023     Lab Results   Component Value Date     (H) 08/30/2023      Lab Results   Component Value Date    NTBNP 778 (H) 08/09/2021      Serum immunofixation shows no monoclonal immunoglobulins. UPEP: no M spike  FLA ratio 2.2 - minimally elevated absolute numbers and wnl for CKD  Remote RF and anti-CCP both wnl    TODAY'S PLAN:  Warm, near euvolemic by exam, recent echo with suggestion of borderline elevated filling pressures, worse on R. Note the maxTRV is measured overly generously. RVSP is borderline elevated by echo.  +ongoing fatigue and CUEVAS, unchanged recently  She has remained on lasix 20 bid  Tolerated recent losartan increase 50>75 qd, home SBP log 130-140s per pt, sometimes 120s as well (nothing consistently lower or higher that she reports)    Serum immunofixation shows no monoclonal immunoglobulins.    UPEP: no M spike  FLA ratio 2.2 - minimally elevated absolute numbers and wnl for CKD  pyp without evidence of cardiac amyloidosis  No further cardiac amyloidosis workup planned now, data points away    Start jardiance 10 qd; discussed risks/benefits/red flags/when to call us; no overt contraindications    cw lasix 20 bid for now    Very Cautious increase losartan 75>100 qd  We had long discussion of red flag Sx at home and when to cut back to prior losartan dosing    On nebivolol 5 qd and verapamil 240 qd  Could consider HR liberalization in future though 80 bpm in office today    On AC    On statin    Note Ongoing high burden RV pacing  EF preserved    Key info from my prior notes:    I am meeting patient for the first time today  Warm, mildly vol up on exam  Lasix recently doubled to lasix 20 bid from qd - within past week - making more urine, she feels slightly better now  Her main complaint is fatigue and SOB, sometimes profound, worse over past year since CVA and also after covid vaccine she says  High RV pacing noted  Office and her home SBP consistently in 130-140s, she keeps good log at home    Her symptoms are likely multifactorial    Limited role for RHC at present time  May reconsider pending Tx as above    Her echo and clinical Hx suggests possible infiltrative CM such as cardiac amyloidosis  Of course she certainly has chronic HTN that can explain similar findings  Will pursue CA workup now - AL screen and pyp ordered  We discussed possible additional questions these tests may raise and potential need for more downstream tests if this leads us to confusion    PPM,  ICD , CRT (if applicable): Interrogation:  8/10/23:  MDT-SINGLE CHAMBER PPM / NOT MRI CONDITIONAL   CARELINK TRANSMISSION: BATTERY VOLTAGE ADEQUATE (8.8 YRS).  77.8% (>40%/VVIR 60); ALL AVAILABLE LEAD PARAMETERS WITHIN NORMAL LIMITS. NO SIGNIFICANT HIGH RATE EPISODES. NORMAL DEVICE FUNCTION.      Studies:  I have reviewed all pertinent patient data/labs/imaging where available, including but not limited to the below studies. Selected results may be displayed here but comprehensive listing is omitted for note clarity and can be found in the epic chart. ECG. Echo. Stress. Cath. HPI:   80 y.o. female Hx per chart p/w HF fu.  No new CP/SOB/dizziness/palpitations/syncope. No new fatigue. No new unintentional weight changes. No new leg swelling, PND, pillow orthopnea. No new fevers, chills, cough, nausea, vomiting, diarrhea, dysuria. Interval History:   As noted in 'plan' section above and prior epic chart notes. No new CP/dizziness/palpitations/syncope. No new unintentional weight changes. No new leg swelling, PND, pillow orthopnea. No new fevers, chills, cough, nausea, vomiting, diarrhea, dysuria. Past Medical History:   Diagnosis Date   • A-fib ([address])    • Anemia    • Arthritis    • Breast pain, right    • Chest pain on breathing    • Colon polyp    • Cough    • Diverticulosis    • Encounter for screening colonoscopy    • H/O sick sinus syndrome    • Heart murmur    • High cholesterol    • History of atrial fibrillation     Rate ontrolled. On xarelto 15mg (creatinine 50) Followed by Dr. Candice Dickerson with Cardiology    • History of weight loss     Report loose fitting clothes. Weight stable (3lbs difference). Last colo showed small tubular adenoma x2. Breast biopsy last showed fibrocystic changes. TSH wnl. Will check cbc, bmp, spep.        • Hx of long term use of blood thinners    • Hypertension    • Irregular heart beat    • RICH (obstructive sleep apnea)    • Pacemaker    • Preop examination    • Shortness of breath    • Viral bronchitis      Patient Active Problem List    Diagnosis Date Noted   • Dyspnea 08/07/2023   • Foot drop, left foot 03/27/2023   • RICH (obstructive sleep apnea)    • Pulmonary hypertension ([address]) 11/23/2022   • Belching 10/05/2022   • Transient neurological symptoms 09/08/2022   • Thrush 09/06/2022   • At risk for delirium 09/06/2022   • Valvular heart disease 08/20/2022   • CAD (coronary artery disease) 08/20/2022   • Urinary retention 08/20/2022   • Neck pain 08/20/2022   • Adjustment disorder with mixed emotional features 08/20/2022   • Anemia 08/19/2022   • At risk for venous thromboembolism (VTE) 08/19/2022   • DARRIUS (acute kidney injury) ([address]) 08/19/2022   • Spastic hemiparesis of left dominant side as late effect of cerebral infarction ([address]) 08/19/2022   • History of CVA (cerebrovascular accident) 08/12/2022   • 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 ([address]) 08/12/2022   • Renal insufficiency    • Abnormal nuclear stress test 08/20/2021   • Diverticulosis of colon 05/20/2019   • Pacemaker 02/26/2018   • Tubulovillous adenoma 02/09/2018   • Gastroesophageal reflux disease without esophagitis 02/09/2018   • Other chest pain 12/08/2017   • Essential hypertension 12/08/2017   • Hyperlipidemia LDL goal <100 12/08/2017   • Stage 3 chronic kidney disease ([address]) 12/08/2017   • Osteoarthritis of both wrists 12/08/2017   • Chronic atrial fibrillation ([address]) 12/08/2017   • Cavus deformity of foot 06/27/2017   • Hallux abducto valgus, bilateral 06/27/2017   • Lipoma of right upper extremity 05/17/2017   • Pain due to onychomycosis of toenails of both feet 01/20/2017   • Allergic rhinitis due to pollen 10/12/2015   • Midline cystocele 12/23/2014   • Osteoarthritis of hip 07/17/2014   • B12 deficiency 12/06/2013   • Osteopenia 11/15/2013   • Left renal artery stenosis ([address]) 08/27/2013   • Left atrial enlargement 08/27/2013       ROS:  10 point ROS negative except as specified in HPI/interval history    Allergies   Allergen Reactions   • Other Itching     Bandaids   • Tape [Medical Tape] Itching       Current Outpatient Medications   Medication Instructions   • acetaminophen (TYLENOL) 650 mg, Oral, Every 6 hours PRN   • ammonium lactate (LAC-HYDRIN) 12 % cream Topical, As needed   • Blood Pressure Monitoring (Blood Pressure Cuff) MISC Does not apply, Every other day, For HTN   • cyanocobalamin 1,000 mcg, Intramuscular, Every 30 days   • Empagliflozin (JARDIANCE) 10 mg, Oral, Every morning   • furosemide (LASIX) 40 mg, Oral, Daily   • Incontinence Supply Disposable (RA Adult Wipes) MISC Does not apply, 4 times daily   • losartan (COZAAR) 50 mg, Oral, Daily   • losartan (COZAAR) 25 mg, Oral, Daily, Take at same time as 50mg tablet for total of 75mg daily   • melatonin 3 mg, Oral, Every evening   • menthol-methyl salicylate (BENGAY) 61-47 % cream Apply externally, 2 times daily   • nebivolol (BYSTOLIC) 5 mg, Oral, Daily   • nitroglycerin (NITROSTAT) 0.4 mg, Sublingual, Every 5 minutes PRN   • pantoprazole (PROTONIX) 40 mg, Oral, Daily (early morning)   • potassium chloride (K-DUR,KLOR-CON) 10 mEq tablet 20 mEq, Oral, Daily   • Pradaxa 150 MG capsu TAKE 1 CAPSULE (150 MG TOTAL) BY MOUTH 2 (TWO) TIMES A DAY   • rosuvastatin (CRESTOR) 5 mg, Oral, Daily   • verapamil (CALAN-SR) 240 mg, Oral, Daily at bedtime        Social History     Socioeconomic History   • Marital status:       Spouse name: Not on file   • Number of children: Not on file   • Years of education: Not on file   • Highest education level: Not on file   Occupational History   • Occupation: retired   Tobacco Use   • Smoking status: Never   • Smokeless tobacco: Never   Vaping Use   • Vaping Use: Never used   Substance and Sexual Activity   • Alcohol use: Never   • Drug use: No   • Sexual activity: Not Currently     Comment:    Other Topics Concern   • Not on file   Social History Narrative    Housing, household, and economic circumstances      Social Determinants of Health     Financial Resource Strain: Low Risk  (8/7/2023)    Overall Financial Resource Strain (CARDIA)    • Difficulty of Paying Living Expenses: Not very hard   Food Insecurity: No Food Insecurity (8/7/2023)    Hunger Vital Sign    • Worried About Running Out of Food in the Last Year: Never true    • Ran Out of Food in the Last Year: Never true   Transportation Needs: No Transportation Needs (8/7/2023)    PRAPARE - Transportation    • Lack of Transportation (Medical): No    • Lack of Transportation (Non-Medical): No   Physical Activity: Inactive (4/14/2021)    Exercise Vital Sign    • Days of Exercise per Week: 0 days    • Minutes of Exercise per Session: 0 min   Stress: No Stress Concern Present (4/14/2021)    [facility]    • Feeling of Stress : Not at all   Social Connections: Moderately Isolated (4/14/2021)    Social Connection and Isolation Panel [NHANES]    • Frequency of Communication with Friends and Family: More than three times a week    • Frequency of Social Gatherings with Friends and Family: More than three times a week    • Attends Sikh Services: More than 4 times per year    • Active Member of Clubs or Organizations: No    • Attends Club or Organization Meetings: Never    • Marital Status:     Intimate Partner Violence: Not At Risk (4/14/2021)    Humiliation, Afraid, Rape, and Kick questionnaire    • Fear of Current or Ex-Partner: No    • Emotionally Abused: No    • Physically Abused: No    • Sexually Abused: No   Housing Stability: Unknown (8/13/2022)    Housing Stability Vital Sign    • Unable to Pay for Housing in the Last Year: No    • Number of [address] in the Last Year: Not on file    • Unstable Housing in the Last Year: No       Family History   Problem Relation Age of Onset   • Diabetes Mother    • Breast cancer Sister 48   • No Known Problems Father    • No Known Problems Maternal Grandmother    • No Known Problems Maternal Grandfather    • No Known Problems Paternal Grandmother    • No Known Problems Paternal Grandfather    • No Known Problems Daughter    • No Known Problems Son    • No Known Problems Sister    • No Known Problems Sister    • No Known Problems Brother    • No Known Problems Brother    • No Known Problems Sister    • No Known Problems Paternal Aunt    • No Known Problems Paternal Aunt    • No Known Problems Paternal Aunt    • No Known Problems Paternal Aunt        Physical Exam:  Vitals:    09/06/23 1106   BP: 150/72   BP Location: Left arm   Patient Position: Sitting   Cuff Size: Standard   Pulse: 80   SpO2: 99%   Weight: 65.9 kg (145 lb 3.2 oz)   Height: 5' 3" (1.6 m)     Constitutional: NAD, non toxic  Ears/nose/mouth/throat: atraumatic  CV: RRR, no JVD  Resp: ctabl  GI: Soft, NTND  MSK: no swollen joints in exposed areas  Extr: trace LE edema L>R (since CVA), warm LE  Pysche: Normal affect  Neuro: appropriate in conversation  Skin: dry and intact in exposed areas    Labs & Results:    Lab Results   Component Value Date    SODIUM 139 08/30/2023    K 3.8 08/30/2023     08/30/2023    CO2 24 08/30/2023    BUN 20 08/30/2023    CREATININE 1.20 08/30/2023    GLUC 86 09/12/2022    CALCIUM 9.1 08/30/2023     Lab Results   Component Value Date    WBC 6.74 08/30/2023    HGB 11.3 (L) 08/30/2023    HCT 36.4 08/30/2023    MCV 98 08/30/2023     08/30/2023     Lab Results   Component Value Date     (H) 08/30/2023      Lab Results   Component Value Date    CHOLESTEROL 118 08/30/2023    CHOLESTEROL 100 08/13/2022    CHOLESTEROL 149 08/16/2021     Lab Results   Component Value Date    HDL 52 08/30/2023    HDL 48 (L) 08/13/2022    HDL 62 08/16/2021     Lab Results   Component Value Date    TRIG 78 08/30/2023    TRIG 71 08/13/2022    TRIG 101 08/16/2021     Lab Results   Component Value Date    NONHDLC 52 08/13/2022    [address] 08/16/2021    [address] 08/18/2020       Counseling / Coordination of Care  Time spent today 28 minutes. Greater than 50% of total time was spent with the patient and / or family counseling and / or coordination of care. We discussed diagnoses, most recent studies, tests and any changes in treatment plan.     Thank you for the opportunity to participate in the care of this patient.     Payton Lezama MD  Attending Physician  Advanced Heart Failure and Transplant Cardiology  [address] [facility]

## 2023-09-06 ENCOUNTER — OFFICE VISIT (OUTPATIENT)
Dept: CARDIOLOGY CLINIC | Facility: CLINIC | Age: 83
End: 2023-09-06
Payer: MEDICARE

## 2023-09-06 VITALS
DIASTOLIC BLOOD PRESSURE: 72 MMHG | BODY MASS INDEX: 25.73 KG/M2 | WEIGHT: 145.2 LBS | OXYGEN SATURATION: 99 % | HEART RATE: 80 BPM | SYSTOLIC BLOOD PRESSURE: 150 MMHG | HEIGHT: 63 IN

## 2023-09-06 DIAGNOSIS — I10 HYPERTENSION, UNSPECIFIED TYPE: ICD-10-CM

## 2023-09-06 DIAGNOSIS — I27.20 PULMONARY HYPERTENSION (HCC): ICD-10-CM

## 2023-09-06 DIAGNOSIS — Z95.0 PRESENCE OF PERMANENT CARDIAC PACEMAKER: ICD-10-CM

## 2023-09-06 DIAGNOSIS — I51.7 LVH (LEFT VENTRICULAR HYPERTROPHY): ICD-10-CM

## 2023-09-06 DIAGNOSIS — I50.30 HEART FAILURE WITH PRESERVED EJECTION FRACTION, UNSPECIFIED HF CHRONICITY (HCC): Primary | ICD-10-CM

## 2023-09-06 PROCEDURE — 99214 OFFICE O/P EST MOD 30 MIN: CPT | Performed by: STUDENT IN AN ORGANIZED HEALTH CARE EDUCATION/TRAINING PROGRAM

## 2023-09-18 ENCOUNTER — CLINICAL SUPPORT (OUTPATIENT)
Dept: INTERNAL MEDICINE CLINIC | Facility: CLINIC | Age: 83
End: 2023-09-18

## 2023-09-18 DIAGNOSIS — E53.8 B12 DEFICIENCY: Chronic | ICD-10-CM

## 2023-09-18 PROCEDURE — 96372 THER/PROPH/DIAG INJ SC/IM: CPT | Performed by: INTERNAL MEDICINE

## 2023-09-18 RX ADMIN — CYANOCOBALAMIN 1000 MCG: 1000 INJECTION, SOLUTION INTRAMUSCULAR; SUBCUTANEOUS at 09:47

## 2023-09-18 NOTE — PROGRESS NOTES
Patient in office today for Vit B12 injection.  Patient supplied the serum and 1.0 ml given in right deltoid.      ndc- 67704-053-81  Lot-   Ex- august 2024

## 2023-09-19 ENCOUNTER — TELEPHONE (OUTPATIENT)
Dept: INTERNAL MEDICINE CLINIC | Facility: CLINIC | Age: 83
End: 2023-09-19

## 2023-09-19 NOTE — TELEPHONE ENCOUNTER
Folder Color- Red    Name of Form- [facility] Prescription     Form to be filled out by-  Dr. Princess Patricia     Form to be Faxed [phone]    Patient made aware of 10 business day policy.

## 2023-09-29 DIAGNOSIS — E53.8 B12 DEFICIENCY: Chronic | ICD-10-CM

## 2023-09-29 DIAGNOSIS — I10 ESSENTIAL HYPERTENSION: ICD-10-CM

## 2023-09-29 DIAGNOSIS — L85.3 XEROSIS CUTIS: ICD-10-CM

## 2023-09-29 DIAGNOSIS — I10 HYPERTENSION, UNSPECIFIED TYPE: ICD-10-CM

## 2023-09-29 RX ORDER — AMMONIUM LACTATE 12 G/100G
CREAM TOPICAL AS NEEDED
Qty: 385 G | Refills: 1 | Status: SHIPPED | OUTPATIENT
Start: 2023-09-29

## 2023-10-02 RX ORDER — NEBIVOLOL 5 MG/1
5 TABLET ORAL DAILY
Qty: 90 TABLET | Refills: 3 | Status: SHIPPED | OUTPATIENT
Start: 2023-10-02

## 2023-10-02 RX ORDER — CYANOCOBALAMIN 1000 UG/ML
1000 INJECTION, SOLUTION INTRAMUSCULAR; SUBCUTANEOUS
Qty: 3 ML | Refills: 0 | Status: SHIPPED | OUTPATIENT
Start: 2023-10-02 | End: 2023-12-31

## 2023-10-02 RX ORDER — VERAPAMIL HYDROCHLORIDE 240 MG/1
240 TABLET, FILM COATED, EXTENDED RELEASE ORAL
Qty: 90 TABLET | Refills: 3 | Status: SHIPPED | OUTPATIENT
Start: 2023-10-02

## 2023-10-06 ENCOUNTER — TELEPHONE (OUTPATIENT)
Dept: INTERNAL MEDICINE CLINIC | Facility: CLINIC | Age: 83
End: 2023-10-06

## 2023-10-06 NOTE — TELEPHONE ENCOUNTER
Received call from [address] [place] [address] coordinator calling to request a prescription for a 3 point hospital bed. Ra Ferrer said one was given to patient in the past but it was not for the correct bed. Please advise.

## 2023-10-09 ENCOUNTER — OFFICE VISIT (OUTPATIENT)
Dept: MULTI SPECIALTY CLINIC | Facility: CLINIC | Age: 83
End: 2023-10-09

## 2023-10-09 VITALS
BODY MASS INDEX: 25.69 KG/M2 | DIASTOLIC BLOOD PRESSURE: 81 MMHG | WEIGHT: 145 LBS | TEMPERATURE: 98 F | OXYGEN SATURATION: 97 % | HEART RATE: 82 BPM | SYSTOLIC BLOOD PRESSURE: 170 MMHG

## 2023-10-09 DIAGNOSIS — M21.372 FOOT DROP, LEFT FOOT: Primary | ICD-10-CM

## 2023-10-09 DIAGNOSIS — B35.1 ONYCHOMYCOSIS: ICD-10-CM

## 2023-10-09 PROCEDURE — 99213 OFFICE O/P EST LOW 20 MIN: CPT | Performed by: PODIATRIST

## 2023-10-09 PROCEDURE — 3077F SYST BP >= 140 MM HG: CPT | Performed by: PODIATRIST

## 2023-10-09 PROCEDURE — 11721 DEBRIDE NAIL 6 OR MORE: CPT | Performed by: PODIATRIST

## 2023-10-09 PROCEDURE — 3079F DIAST BP 80-89 MM HG: CPT | Performed by: PODIATRIST

## 2023-10-09 NOTE — PROGRESS NOTES
Podiatry Clinic  Yudelka Sanchez 80 y.o. female MRN: [id number]  Encounter: [id number]      Assessment/Plan        Diagnoses and all orders for this visit:    Foot drop, left foot    Onychomycosis           Plan:  • Patient was seen/examined. All questions and concerns addressed  • Debridement of mycotic toenails x10 using a large nail nipper without incident. See procedure note below. • Recommended and emphasized the patient use AFO brace from [address] [place] [address] and supportive shoes as well as a walker to assist her with ambulation. Patient was amenable to this recommendation only if she begins to have issues with walking  • RTC in 3 month(s)    Dr. Elvi Briggs was present during this entire procedure. Nail debridement     Date/Time 10/9/2023 4:10 PM     Performed by  Janet Scott DPM   Authorized by Janet Scott DPM     Universal Protocol   Consent: Verbal consent obtained. Consent given by: patient  Patient identity confirmed: verbally with patient       Procedure Details   Procedure Notes: After verbal consent was obtained, using a large nail nipper to debride, de-bulk, and shorten the elongated, discolored, and hypertrophic toenails with subungual debris of toes 1-5 b/l without incident  Patient tolerance: patient tolerated the procedure well with no immediate complications               History of Present Illness     HPI: 80year-old patient presenting with painful toenail fungus to the left foot. Patient reports that the left foot especially the big toe has been particularly painful at her toenails. Patient reports that her right foot is completely fine and not painful. Patient did  and received her AFO brace from [facility]. However she tried to wear it twice and found it uncomfortable with it slipping out of her shoes. Patient reports that she does not have any issues with walking and uses a walker to assist her self. Review of Systems   Constitutional: Negative. HENT: Negative. Eyes: Negative. Respiratory: Negative. Cardiovascular: Negative. Gastrointestinal: Negative. Musculoskeletal: negative  Skin: Painful toenails to the left foot with bilateral toenail fungus  Neurological: Negative. Historical Information   Past Medical History:   Diagnosis Date   • A-fib ([address])    • Anemia    • Arthritis    • Breast pain, right    • Chest pain on breathing    • Colon polyp    • Cough    • Diverticulosis    • Encounter for screening colonoscopy    • H/O sick sinus syndrome    • Heart murmur    • High cholesterol    • History of atrial fibrillation     Rate ontrolled. On xarelto 15mg (creatinine 50) Followed by Dr. Jason Alvarez with Cardiology    • History of weight loss     Report loose fitting clothes. Weight stable (3lbs difference). Last colo showed small tubular adenoma x2. Breast biopsy last showed fibrocystic changes. TSH wnl. Will check cbc, bmp, spep. • Hx of long term use of blood thinners    • Hypertension    • Irregular heart beat    • RICH (obstructive sleep apnea)    • Pacemaker    • Preop examination    • Shortness of breath    • Viral bronchitis      Past Surgical History:   Procedure Laterality Date   • BREAST BIOPSY Right 08/17/2006    ultrasound guided Percutaneous Needle Core -Benign   • CATARACT EXTRACTION     • CATARACT EXTRACTION W/ INTRAOCULAR LENS  IMPLANT, BILATERAL     • COLONOSCOPY     • COLONOSCOPY N/A 5/22/2018    Procedure: COLONOSCOPY;  Surgeon: Mariano Burns DO;  Location: [facility];   Service: Gastroenterology   • INSERT / Mirtha Boo / Rickie Dejesus     • LEG SURGERY Right     as a child after a fall   • MAMMO STEREOTACTIC BREAST BIOPSY RIGHT (ALL INC) Right 10/2014    benign   • RI CMBND ANTERPOST COLPORRAPHY W/CYSTO N/A 3/17/2016    Procedure: COLPORRHAPHY ANTERIOR POSTERIOR ;  Surgeon: Chao Lewis MD;  Location: [facility];  Service: Gynecology   • RI COLONOSCOPY FLX DX W/COLLJ SPEC WHEN PFRMD N/A 4/4/2019    Procedure: COLONOSCOPY; Surgeon: Harini Echeverria DO;  Location: [facility]; Service: Gastroenterology   • AL COLPOPEXY VAGINAL EXTRAPERITONEAL APPROACH N/A 3/17/2016    Procedure: COLPOPEXY VAGINAL EXTRAPERITONEAL (VEC) ANTERIOR ;  Surgeon: Kan Morales MD;  [facility];  Service: Gynecology   • AL CYSTOURETHROSCOPY N/A 3/17/2016    Procedure: CYSTOSCOPY;  Surgeon: Kan Morales MD;  [facility];  Service: Gynecology   • AL ESOPHAGOGASTRODUODENOSCOPY TRANSORAL DIAGNOSTIC N/A 4/16/2018    Procedure: EGD AND COLONOSCOPY;  Surgeon: Harini Echeverria DO;  Location: [facility];   Service: Gastroenterology   • AL LAPAROSCOPY COLECTOMY PARTIAL W/ANASTOMOSIS Right 1/13/2022    Procedure: Diagnostic laparoscopy, laparscopic right colectomy;  Surgeon: Mikaela Quintanilla MD;  Location: [facility];  Service: Colorectal   • AL SLING OPERATION STRESS INCONTINENCE N/A 3/17/2016    Procedure: INSERTION PUBOVAGINAL SLING SINGLE INCISION ;  Surgeon: Kan Morales MD;  [facility];  Service: Gynecology     Social History   Social History     Substance and Sexual Activity   Alcohol Use Never     Social History     Substance and Sexual Activity   Drug Use No     Social History     Tobacco Use   Smoking Status Never   Smokeless Tobacco Never     Family History:   Family History   Problem Relation Age of Onset   • Diabetes Mother    • Breast cancer Sister 48   • No Known Problems Father    • No Known Problems Maternal Grandmother    • No Known Problems Maternal Grandfather    • No Known Problems Paternal Grandmother    • No Known Problems Paternal Grandfather    • No Known Problems Daughter    • No Known Problems Son    • No Known Problems Sister    • No Known Problems Sister    • No Known Problems Brother    • No Known Problems Brother    • No Known Problems Sister    • No Known Problems Paternal Aunt    • No Known Problems Paternal Aunt    • No Known Problems Paternal Aunt    • No Known Problems Paternal Aunt        Meds/Allergies   (Not in a hospital admission)    Allergies   Allergen Reactions   • Other Itching     Bandaids   • Tape [Medical Tape] Itching       Objective     Current Vitals:   Blood Pressure: 170/81 (10/09/23 1528)  Pulse: 82 (10/09/23 1528)  Temperature: 98 °F (36.7 °C) (10/09/23 1528)  Temp Source: Temporal (10/09/23 1528)  Weight - Scale: 65.8 kg (145 lb) (10/09/23 1528)  SpO2: 97 % (10/09/23 1528)        /81 (BP Location: Left arm, Patient Position: Sitting, Cuff Size: Large)   Pulse 82   Temp 98 °F (36.7 °C) (Temporal)   Wt 65.8 kg (145 lb)   SpO2 97%   BMI 25.69 kg/m²       Lower Extremity Exam:    Vascular: Right foot DP/PT +2                   Left foot DP/PT +2                   There is +2 lower extremity edema left lower extremity, there is trace edema to the right lower extremity. Musculoskeletal: There is 4/5 strength throughout the right lower extremity and 3/5 to the anterior compartment of the left lower extremity with the posterior and lateral compartments being 4/5 strength. .           limited ankle range of motion with diminished subtalar range of motion. There is moderate tenderness over the left hallux toenail. There is foot deformities: bilateral reducible hammer toes of toes 2, 3, and 4. Neurological: Sensation to 5.07 [place] nylon filament: diminished to sensation  bilaterally      Vibratory sense to distal Foot  diminished to vibratory bilaterally      Sharp/Dull sense is positive bilaterally      Dermatology: Skin Condition:  no lesions noted and normal     There is not evidence of macerated tissue between toe spaces. Nail Exam: elongated, discolored, hypertrophic mycotic toenails with subungual debris.      Open ulcerations: No     Calluses: No

## 2023-10-17 NOTE — PROGRESS NOTES
Advanced Heart Failure/Pulmonary Hypertension Outpatient Note - Olga Squires 80 y.o. female MRN: [id number]    @ Encounter: [id number]    Assessment:  80 y.o. female Hx per chart p/w HF fu.    Follows primarily with EP Dr. Marla Hawkins  AF on [facility]  HFpEF, RV size/fxn wnl  PH. +RVOT notching. Significant group II contribution likely. 2023 echo with suggestion of borderline elevated filling pressures, worse on R. Note the maxTRV is measured overly generously. RVSP is borderline elevated by echo. Mild-mod AI  Mild AS  CVA with L hemiparesis  Tachybrady syndrome s/p VVI pacemaker. Per chart:  "She had left sided erosion remotely and lead extracted. Paces up to 76 % since they increased bystolic"  Nonobstructive CAD. Had [facility] for borderline stress test 50% RCA and nonobstructive. HTN  CKD  Anemia  Vit B12 deficient and gets injections      I have reviewed all pertinent patient data including but not limited to:    Lab Results   Component Value Date    CREATININE 1.20 08/30/2023     Lab Results   Component Value Date    K 3.8 08/30/2023     Lab Results   Component Value Date    HGBA1C 5.4 08/13/2022     Lab Results   Component Value Date    UWD8QZNNLGEU 5.777 (H) 08/30/2023     Lab Results   Component Value Date    LDLCALC 50 08/30/2023     Lab Results   Component Value Date     (H) 08/30/2023      Lab Results   Component Value Date    NTBNP 778 (H) 08/09/2021      Serum immunofixation shows no monoclonal immunoglobulins.    UPEP: no M spike  FLA ratio 2.2 - minimally elevated absolute numbers and wnl for CKD  Remote RF and anti-CCP both wnl    TODAY'S PLAN:  Warm, mildly vol up  Feels worse SOB and fatigue recent weeks  Home SBP often 160-170s  Office BP high  It is unclear if she made recommended med changes after last visit    Increase lasix 40 AM 20PM to 40 bid    Increase losartan 75 qd to 100 qd    Check LE dopplers for asymmetric LLE pain/swelling  Note on AC    Fu BMP in 1 week    Tolerating new  jardiance 10 qd; discussed risks/benefits/red flags/when to call us; no overt contraindications    On nebivolol 5 qd and verapamil 240 qd  Could consider HR liberalization  Fu EP  Extensive vpacing - monitor Sx after diuresis and BP control, recheck acho and consider med change vs biv upgrade pending course    On AC    On statin    Key info from my prior notes:    I am meeting patient for the first time today  Warm, mildly vol up on exam  Lasix recently doubled to lasix 20 bid from qd - within past week - making more urine, she feels slightly better now  Her main complaint is fatigue and SOB, sometimes profound, worse over past year since CVA and also after covid vaccine she says  High RV pacing noted  Office and her home SBP consistently in 130-140s, she keeps good log at home    Her symptoms are likely multifactorial    Limited role for RHC at present time  May reconsider pending Tx as above    Her echo and clinical Hx suggests possible infiltrative CM such as cardiac amyloidosis  Of course she certainly has chronic HTN that can explain similar findings  Will pursue CA workup now - AL screen and pyp ordered  We discussed possible additional questions these tests may raise and potential need for more downstream tests if this leads us to confusion    Serum immunofixation shows no monoclonal immunoglobulins. UPEP: no M spike  FLA ratio 2.2 - minimally elevated absolute numbers and wnl for CKD  pyp without evidence of cardiac amyloidosis  No further cardiac amyloidosis workup planned now, data points away    PPM,  ICD , CRT (if applicable): Interrogation:  8/10/23:  MDT-SINGLE CHAMBER PPM / NOT MRI CONDITIONAL   CARELINK TRANSMISSION: BATTERY VOLTAGE ADEQUATE (8.8 YRS).  77.8% (>40%/VVIR 60); ALL AVAILABLE LEAD PARAMETERS WITHIN NORMAL LIMITS. NO SIGNIFICANT HIGH RATE EPISODES. NORMAL DEVICE FUNCTION.      Studies:  I have reviewed all pertinent patient data/labs/imaging where available, including but not limited to the below studies. Selected results may be displayed here but comprehensive listing is omitted for note clarity and can be found in the epic chart. ECG. Echo. Stress. Cath. HPI:   80 y.o. female Hx per chart p/w HF fu.  No new CP/SOB/dizziness/palpitations/syncope. No new fatigue. No new unintentional weight changes. No new leg swelling, PND, pillow orthopnea. No new fevers, chills, cough, nausea, vomiting, diarrhea, dysuria. Interval History:   As noted in 'plan' section above and prior epic chart notes. No new CP/dizziness/palpitations/syncope. No new unintentional weight changes. No new PND, pillow orthopnea. No new fevers, chills, cough, nausea, vomiting, diarrhea, dysuria. Past Medical History:   Diagnosis Date    A-fib ([address])     Anemia     Arthritis     Breast pain, right     Chest pain on breathing     Colon polyp     Cough     Diverticulosis     Encounter for screening colonoscopy     H/O sick sinus syndrome     Heart murmur     High cholesterol     History of atrial fibrillation     Rate ontrolled. On xarelto 15mg (creatinine 50) Followed by Dr. Altagracia Andre with Cardiology     History of weight loss     Report loose fitting clothes. Weight stable (3lbs difference). Last colo showed small tubular adenoma x2. Breast biopsy last showed fibrocystic changes. TSH wnl. Will check cbc, bmp, spep.         Hx of long term use of blood thinners     Hypertension     Irregular heart beat     RICH (obstructive sleep apnea)     Pacemaker     Preop examination     Shortness of breath     Viral bronchitis      Patient Active Problem List    Diagnosis Date Noted    Dyspnea 08/07/2023    Foot drop, left foot 03/27/2023    RICH (obstructive sleep apnea)     Pulmonary hypertension ([address]) 11/23/2022    Belching 10/05/2022    Transient neurological symptoms 09/08/2022    Thrush 09/06/2022    At risk for delirium 09/06/2022    Valvular heart disease 08/20/2022    CAD (coronary artery disease) 08/20/2022    Urinary retention 08/20/2022    Neck pain 08/20/2022    Adjustment disorder with mixed emotional features 08/20/2022    Anemia 08/19/2022    At risk for venous thromboembolism (VTE) 08/19/2022    DARRIUS (acute kidney injury) ([address]) 08/19/2022    Spastic hemiparesis of left dominant side as late effect of cerebral infarction ([address]) 08/19/2022    History of CVA (cerebrovascular accident) 08/12/2022    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 ([address]) 08/12/2022    Renal insufficiency     Abnormal nuclear stress test 08/20/2021    Diverticulosis of colon 05/20/2019    Pacemaker 02/26/2018    Tubulovillous adenoma 02/09/2018    Gastroesophageal reflux disease without esophagitis 02/09/2018    Other chest pain 12/08/2017    Essential hypertension 12/08/2017    Hyperlipidemia LDL goal <100 12/08/2017    Stage 3 chronic kidney disease ([address]) 12/08/2017    Osteoarthritis of both wrists 12/08/2017    Chronic atrial fibrillation ([address]) 12/08/2017    Cavus deformity of foot 06/27/2017    Hallux abducto valgus, bilateral 06/27/2017    Lipoma of right upper extremity 05/17/2017    Pain due to onychomycosis of toenails of both feet 01/20/2017    Allergic rhinitis due to pollen 10/12/2015    Midline cystocele 12/23/2014    Osteoarthritis of hip 07/17/2014    B12 deficiency 12/06/2013    Osteopenia 11/15/2013    Left renal artery stenosis ([address]) 08/27/2013    Left atrial enlargement 08/27/2013       ROS:  10 point ROS negative except as specified in HPI/interval history    Allergies   Allergen Reactions    Other Itching     Bandaids    Tape [Medical Tape] Itching       Current Outpatient Medications   Medication Instructions    acetaminophen (TYLENOL) 650 mg, Oral, Every 6 hours PRN    ammonium lactate (LAC-HYDRIN) 12 % cream Topical, As needed    Blood Pressure Monitoring (Blood Pressure Cuff) MISC Does not apply, Every other day, For HTN    cyanocobalamin 1,000 mcg, Intramuscular, Every 30 days    Empagliflozin (Tina Rodríguez) 10 mg, Oral, Every morning    furosemide (LASIX) 40 mg, Oral, 2 times daily    Incontinence Supply Disposable (RA Adult Wipes) MISC Does not apply, 4 times daily    losartan (COZAAR) 100 mg, Oral, Daily    melatonin 3 mg, Oral, Every evening    menthol-methyl salicylate (BENGAY) 43-56 % cream Apply externally, 2 times daily    nebivolol (BYSTOLIC) 5 mg, Oral, Daily    nitroglycerin (NITROSTAT) 0.4 mg, Sublingual, Every 5 minutes PRN    pantoprazole (PROTONIX) 40 mg, Oral, Daily (early morning)    potassium chloride (K-DUR,KLOR-CON) 10 mEq tablet 20 mEq, Oral, Daily    Pradaxa 150 MG capsu TAKE 1 CAPSULE (150 MG TOTAL) BY MOUTH 2 (TWO) TIMES A DAY    rosuvastatin (CRESTOR) 5 mg, Oral, Daily    verapamil (CALAN-SR) 240 mg, Oral, Daily at bedtime        Social History     Socioeconomic History    Marital status:      Spouse name: Not on file    Number of children: Not on file    Years of education: Not on file    Highest education level: Not on file   Occupational History    Occupation: retired   Tobacco Use    Smoking status: Never    Smokeless tobacco: Never   Vaping Use    Vaping Use: Never used   Substance and Sexual Activity    Alcohol use: Never    Drug use: No    Sexual activity: Not Currently     Comment:    Other Topics Concern    Not on file   Social History Narrative    Housing, household, and economic circumstances      Social Determinants of Health     Financial Resource Strain: Low Risk  (8/7/2023)    Overall Financial Resource Strain (CARDIA)     Difficulty of Paying Living Expenses: Not very hard   Food Insecurity: No Food Insecurity (8/7/2023)    Hunger Vital Sign     Worried About Running Out of Food in the Last Year: Never true     Ran Out of Food in the Last Year: Never true   Transportation Needs: No Transportation Needs (8/7/2023)    PRAPARE - Transportation     Lack of Transportation (Medical): No     Lack of Transportation (Non-Medical):  No   Physical Activity: Inactive (4/14/2021) Exercise Vital Sign     Days of Exercise per Week: 0 days     Minutes of Exercise per Session: 0 min   Stress: No Stress Concern Present (4/14/2021)    [facility]     Feeling of Stress : Not at all   Social Connections: Moderately Isolated (4/14/2021)    Social Connection and Isolation Panel [NHANES]     Frequency of Communication with Friends and Family: More than three times a week     Frequency of Social Gatherings with Friends and Family: More than three times a week     Attends L[employer] Services: More than 4 times per year     Active Member of [employer] or Organizations: No     Attends Club or Organization Meetings: Never     Marital Status:     Intimate Partner Violence: Not At Risk (4/14/2021)    Humiliation, Afraid, Rape, and Kick questionnaire     Fear of Current or Ex-Partner: No     Emotionally Abused: No     Physically Abused: No     Sexually Abused: No   Housing Stability: Unknown (8/13/2022)    Housing Stability Vital Sign     Unable to Pay for Housing in the Last Year: No     Number of Places Lived in the Last Year: Not on file     Unstable Housing in the Last Year: No       Family History   Problem Relation Age of Onset    Diabetes Mother     Breast cancer Sister 48    No Known Problems Father     No Known Problems Maternal Grandmother     No Known Problems Maternal Grandfather     No Known Problems Paternal Grandmother     No Known Problems Paternal Grandfather     No Known Problems Daughter     No Known Problems Son     No Known Problems Sister     No Known Problems Sister     No Known Problems Brother     No Known Problems Brother     No Known Problems Sister     No Known Problems Paternal Aunt     No Known Problems Paternal Aunt     No Known Problems Paternal Aunt     No Known Problems Paternal Aunt        Physical Exam:  Vitals:    10/18/23 1108   BP: 142/66   BP Location: Right arm   Patient Position: Sitting   Cuff Size: Standard   Pulse: 78   SpO2: 97%   Weight: 66 kg (145 lb 8 oz)   Height: 5' 3" (1.6 m)       Constitutional: NAD, non toxic  Ears/nose/mouth/throat: atraumatic  CV: RRR, + JVD  Resp: ctabl  GI: Soft, NTND  MSK: no swollen joints in exposed areas  Extr: +1 LE edema L>R (since CVA), warm LE  Pysche: Normal affect  Neuro: appropriate in conversation  Skin: dry and intact in exposed areas    Labs & Results:    Lab Results   Component Value Date    SODIUM 139 08/30/2023    K 3.8 08/30/2023     08/30/2023    CO2 24 08/30/2023    BUN 20 08/30/2023    CREATININE 1.20 08/30/2023    GLUC 86 09/12/2022    CALCIUM 9.1 08/30/2023     Lab Results   Component Value Date    WBC 6.74 08/30/2023    HGB 11.3 (L) 08/30/2023    HCT 36.4 08/30/2023    MCV 98 08/30/2023     08/30/2023     Lab Results   Component Value Date     (H) 08/30/2023      Lab Results   Component Value Date    CHOLESTEROL 118 08/30/2023    CHOLESTEROL 100 08/13/2022    CHOLESTEROL 149 08/16/2021     Lab Results   Component Value Date    HDL 52 08/30/2023    HDL 48 (L) 08/13/2022    HDL 62 08/16/2021     Lab Results   Component Value Date    TRIG 78 08/30/2023    TRIG 71 08/13/2022    TRIG 101 08/16/2021     Lab Results   Component Value Date    [address] 52 08/13/2022    [address] 87 08/16/2021    [address] 89 08/18/2020       Counseling / Coordination of Care  Time spent today 27 minutes. Greater than 50% of total time was spent with the patient and / or family counseling and / or coordination of care. We discussed diagnoses, most recent studies, tests and any changes in treatment plan. Thank you for the opportunity to participate in the care of this patient.     Jadyn St MD  Attending Physician  Advanced Heart Failure and Transplant Cardiology  [address] [facility]

## 2023-10-18 ENCOUNTER — OFFICE VISIT (OUTPATIENT)
Dept: CARDIOLOGY CLINIC | Facility: CLINIC | Age: 83
End: 2023-10-18
Payer: MEDICARE

## 2023-10-18 VITALS
HEIGHT: 63 IN | WEIGHT: 145.5 LBS | DIASTOLIC BLOOD PRESSURE: 66 MMHG | OXYGEN SATURATION: 97 % | HEART RATE: 78 BPM | BODY MASS INDEX: 25.78 KG/M2 | SYSTOLIC BLOOD PRESSURE: 142 MMHG

## 2023-10-18 DIAGNOSIS — I10 HYPERTENSION, UNSPECIFIED TYPE: ICD-10-CM

## 2023-10-18 DIAGNOSIS — M79.89 LEG SWELLING: ICD-10-CM

## 2023-10-18 DIAGNOSIS — Z95.0 CARDIAC PACEMAKER IN SITU: ICD-10-CM

## 2023-10-18 DIAGNOSIS — I10 ESSENTIAL HYPERTENSION: ICD-10-CM

## 2023-10-18 DIAGNOSIS — I50.30 HEART FAILURE WITH PRESERVED EJECTION FRACTION, UNSPECIFIED HF CHRONICITY (HCC): Primary | ICD-10-CM

## 2023-10-18 DIAGNOSIS — I27.20 PULMONARY HYPERTENSION (HCC): ICD-10-CM

## 2023-10-18 PROCEDURE — 99214 OFFICE O/P EST MOD 30 MIN: CPT | Performed by: STUDENT IN AN ORGANIZED HEALTH CARE EDUCATION/TRAINING PROGRAM

## 2023-10-18 RX ORDER — FUROSEMIDE 20 MG/1
40 TABLET ORAL 2 TIMES DAILY
Qty: 360 TABLET | Refills: 5 | Status: SHIPPED | OUTPATIENT
Start: 2023-10-18 | End: 2025-04-10

## 2023-10-18 RX ORDER — LOSARTAN POTASSIUM 50 MG/1
100 TABLET ORAL DAILY
Qty: 180 TABLET | Refills: 5 | Status: SHIPPED | OUTPATIENT
Start: 2023-10-18 | End: 2025-04-10

## 2023-10-19 ENCOUNTER — CLINICAL SUPPORT (OUTPATIENT)
Dept: INTERNAL MEDICINE CLINIC | Facility: CLINIC | Age: 83
End: 2023-10-19

## 2023-10-19 ENCOUNTER — TELEPHONE (OUTPATIENT)
Dept: INTERNAL MEDICINE CLINIC | Facility: CLINIC | Age: 83
End: 2023-10-19

## 2023-10-19 DIAGNOSIS — E53.8 B12 DEFICIENCY: Primary | ICD-10-CM

## 2023-10-19 NOTE — TELEPHONE ENCOUNTER
Continue close vascular follow-up  Folder Color-RED    Name of Form-Family Medical Leave Act    Form to be filled out by-Attending    Form to be Faxed to [phone]    Patient made aware of 10 business day policy.

## 2023-10-25 ENCOUNTER — APPOINTMENT (OUTPATIENT)
Dept: LAB | Facility: CLINIC | Age: 83
End: 2023-10-25
Payer: MEDICARE

## 2023-10-25 DIAGNOSIS — I10 ESSENTIAL HYPERTENSION: ICD-10-CM

## 2023-10-25 LAB
ANION GAP SERPL CALCULATED.3IONS-SCNC: 8 MMOL/L
BUN SERPL-MCNC: 17 MG/DL (ref 5–25)
CALCIUM SERPL-MCNC: 9.1 MG/DL (ref 8.4–10.2)
CHLORIDE SERPL-SCNC: 107 MMOL/L (ref 96–108)
CO2 SERPL-SCNC: 26 MMOL/L (ref 21–32)
CREAT SERPL-MCNC: 0.93 MG/DL (ref 0.6–1.3)
GFR SERPL CREATININE-BSD FRML MDRD: 57 ML/MIN/1.73SQ M
GLUCOSE P FAST SERPL-MCNC: 87 MG/DL (ref 65–99)
POTASSIUM SERPL-SCNC: 3.9 MMOL/L (ref 3.5–5.3)
SODIUM SERPL-SCNC: 141 MMOL/L (ref 135–147)

## 2023-10-25 PROCEDURE — 36415 COLL VENOUS BLD VENIPUNCTURE: CPT

## 2023-10-25 PROCEDURE — 80048 BASIC METABOLIC PNL TOTAL CA: CPT

## 2023-10-31 NOTE — PROGRESS NOTES
Advanced Heart Failure/Pulmonary Hypertension Outpatient Note - Danial Zavala 80 y.o. female MRN: [id number]    @ Encounter: [id number]    Assessment:  80 y.o. female Hx per chart p/w HF fu.    Follows primarily with EP Dr. Donna Thakur  AF on [facility]  HFpEF, RV size/fxn wnl  AL amyloid screen was neg  PYP scan was not suggestive of cardiac amyloidosis  CA unlikely at this point  [address]. +RVOT notching. Significant group II contribution likely. 2023 echo with suggestion of borderline elevated filling pressures, worse on R. Note the maxTRV is measured overly generously. RVSP was borderline elevated by echo. Mild-mod AI  Mild AS  Mild MR  CVA with L hemiparesis  Tachybrady syndrome s/p S-PPM, VVI pacemaker. Per chart:  "She had left sided erosion remotely and lead extracted. Paces up to 76 % since they increased bystolic"  Nonobstructive CAD. Had [address] for borderline stress test 50% RCA and nonobstructive. HTN  CKD  Anemia  Vit B12 deficient and gets injections      I have reviewed all pertinent patient data including but not limited to:    Lab Results   Component Value Date    CREATININE 0.93 10/25/2023     Lab Results   Component Value Date    K 3.9 10/25/2023     Lab Results   Component Value Date    HGBA1C 5.4 08/13/2022     Lab Results   Component Value Date    CAG5GNROPSHK 5.777 (H) 08/30/2023     Lab Results   Component Value Date    LDLCALC 50 08/30/2023     Lab Results   Component Value Date     (H) 08/30/2023      Lab Results   Component Value Date    NTBNP 778 (H) 08/09/2021      Serum immunofixation shows no monoclonal immunoglobulins.    UPEP: no M spike  FLA ratio 2.2 - minimally elevated absolute numbers and wnl for CKD  Remote RF and anti-CCP both wnl    TODAY'S PLAN:  Warm, euvolemic  No new cardiac complaints, feels ongoing but unchanged fatigue  Home -150 consistently, rarely less she says, occassionaly 160s and past 180s  Office BP elevated  Labs after recent GDMT changes were acceptable  LE dopplers scheduled for asymetric LE swelling (note already on [facility] though does not preclude)  She is taking her meds as prescribed    Will pursue home nurses and PT with her PCP    Add aldactone 25 qd now for HFpEF and HTN  Stop Kdur  Fu BMP in 1 week    Cw lasix 40 bid (increased from prior)    cw losartan 100 qd (increased from prior)    Tolerating new  jardiance 10 qd; discussed risks/benefits/red flags/when to call us; no overt contraindications    On nebivolol 5 qd and verapamil 240 qd  HR in 70s now, minimal room for liberalization it seems  Fu EP  Extensive vpacing - monitor symptoms after diuresis and BP control, recheck echo and consider med change vs biv upgrade pending course (complex in her given age, frailty, past PPM lead extraction issues)    On AC    On statin    Key info from my prior notes:    I am meeting patient for the first time today  Warm, mildly vol up on exam  Lasix recently doubled to lasix 20 bid from qd - within past week - making more urine, she feels slightly better now  Her main complaint is fatigue and SOB, sometimes profound, worse over past year since CVA and also after covid vaccine she says  High RV pacing noted  Office and her home SBP consistently in 130-140s, she keeps good log at home    Her symptoms are likely multifactorial    Limited role for RHC at present time  May reconsider pending Tx as above    Her echo and clinical Hx suggests possible infiltrative CM such as cardiac amyloidosis  Of course she certainly has chronic HTN that can explain similar findings  Will pursue CA workup now - AL screen and pyp ordered  We discussed possible additional questions these tests may raise and potential need for more downstream tests if this leads us to confusion    Serum immunofixation shows no monoclonal immunoglobulins.    UPEP: no M spike  FLA ratio 2.2 - minimally elevated absolute numbers and wnl for CKD  pyp without evidence of cardiac amyloidosis  No further cardiac amyloidosis workup planned now, data points away    PPM,  ICD , CRT (if applicable): Interrogation:  8/10/23:  MDT-SINGLE CHAMBER PPM / NOT MRI CONDITIONAL   CARELINK TRANSMISSION: BATTERY VOLTAGE ADEQUATE (8.8 YRS).  77.8% (>40%/VVIR 60); ALL AVAILABLE LEAD PARAMETERS WITHIN NORMAL LIMITS. NO SIGNIFICANT HIGH RATE EPISODES. NORMAL DEVICE FUNCTION. Studies:  I have reviewed all pertinent patient data/labs/imaging where available, including but not limited to the below studies. Selected results may be displayed here but comprehensive listing is omitted for note clarity and can be found in the epic chart. ECG. Echo. Stress. Cath. HPI:   80 y.o. female Hx per chart p/w HF fu.  No new CP/SOB/dizziness/palpitations/syncope. No new fatigue. No new unintentional weight changes. No new leg swelling, PND, pillow orthopnea. No new fevers, chills, cough, nausea, vomiting, diarrhea, dysuria. Interval History:   As noted in 'plan' section above and prior epic chart notes. No new CP/SOB/dizziness/palpitations/syncope. No new fatigue. No new unintentional weight changes. No new leg swelling, PND, pillow orthopnea. No new fevers, chills, cough, nausea, vomiting, diarrhea, dysuria. Past Medical History:   Diagnosis Date    A-fib ([address])     Anemia     Arthritis     Breast pain, right     Chest pain on breathing     Colon polyp     Cough     Diverticulosis     Encounter for screening colonoscopy     H/O sick sinus syndrome     Heart murmur     High cholesterol     History of atrial fibrillation     Rate ontrolled. On xarelto 15mg (creatinine 50) Followed by Dr. Clark Rodriguez with Cardiology     History of weight loss     Report loose fitting clothes. Weight stable (3lbs difference). Last colo showed small tubular adenoma x2. Breast biopsy last showed fibrocystic changes. TSH wnl. Will check cbc, bmp, spep.         Hx of long term use of blood thinners     Hypertension     Irregular heart beat     RICH (obstructive sleep apnea)     Pacemaker     Preop examination     Shortness of breath     Viral bronchitis      Patient Active Problem List    Diagnosis Date Noted    Dyspnea 08/07/2023    Foot drop, left foot 03/27/2023    RICH (obstructive sleep apnea)     Pulmonary hypertension ([address]) 11/23/2022    Belching 10/05/2022    Transient neurological symptoms 09/08/2022    Thrush 09/06/2022    At risk for delirium 09/06/2022    Valvular heart disease 08/20/2022    CAD (coronary artery disease) 08/20/2022    Urinary retention 08/20/2022    Neck pain 08/20/2022    Adjustment disorder with mixed emotional features 08/20/2022    Anemia 08/19/2022    At risk for venous thromboembolism (VTE) 08/19/2022    DARRIUS (acute kidney injury) ([address]) 08/19/2022    Spastic hemiparesis of left dominant side as late effect of cerebral infarction ([address]) 08/19/2022    History of CVA (cerebrovascular accident) 08/12/2022    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 ([address]) 08/12/2022    Renal insufficiency     Abnormal nuclear stress test 08/20/2021    Diverticulosis of colon 05/20/2019    Pacemaker 02/26/2018    Tubulovillous adenoma 02/09/2018    Gastroesophageal reflux disease without esophagitis 02/09/2018    Other chest pain 12/08/2017    Essential hypertension 12/08/2017    Hyperlipidemia LDL goal <100 12/08/2017    Stage 3 chronic kidney disease ([address]) 12/08/2017    Osteoarthritis of both wrists 12/08/2017    Chronic atrial fibrillation ([address]) 12/08/2017    Cavus deformity of foot 06/27/2017    Hallux abducto valgus, bilateral 06/27/2017    Lipoma of right upper extremity 05/17/2017    Pain due to onychomycosis of toenails of both feet 01/20/2017    Allergic rhinitis due to pollen 10/12/2015    Midline cystocele 12/23/2014    Osteoarthritis of hip 07/17/2014    B12 deficiency 12/06/2013    Osteopenia 11/15/2013    Left renal artery stenosis ([address]) 08/27/2013    Left atrial enlargement 08/27/2013       ROS:  10 point ROS negative except as specified in HPI/interval history    Allergies   Allergen Reactions    Other Itching     Bandaids    Tape [Medical Tape] Itching       Current Outpatient Medications   Medication Instructions    acetaminophen (TYLENOL) 650 mg, Oral, Every 6 hours PRN    ammonium lactate (LAC-HYDRIN) 12 % cream Topical, As needed    Blood Pressure Monitoring (Blood Pressure Cuff) MISC Does not apply, Every other day, For HTN    cyanocobalamin 1,000 mcg, Intramuscular, Every 30 days    Empagliflozin (JARDIANCE) 10 mg, Oral, Every morning    furosemide (LASIX) 40 mg, Oral, 2 times daily    Incontinence Supply Disposable (RA Adult Wipes) MISC Does not apply, 4 times daily    losartan (COZAAR) 100 mg, Oral, Daily    melatonin 3 mg, Oral, Every evening    menthol-methyl salicylate (BENGAY) 46-66 % cream Apply externally, 2 times daily    nebivolol (BYSTOLIC) 5 mg, Oral, Daily    nitroglycerin (NITROSTAT) 0.4 mg, Sublingual, Every 5 minutes PRN    pantoprazole (PROTONIX) 40 mg, Oral, Daily (early morning)    Pradaxa 150 MG capsu TAKE 1 CAPSULE (150 MG TOTAL) BY MOUTH 2 (TWO) TIMES A DAY    rosuvastatin (CRESTOR) 5 mg, Oral, Daily    spironolactone (ALDACTONE) 25 mg, Oral, Daily    verapamil (CALAN-SR) 240 mg, Oral, Daily at bedtime        Social History     Socioeconomic History    Marital status:       Spouse name: Not on file    Number of children: Not on file    Years of education: Not on file    Highest education level: Not on file   Occupational History    Occupation: retired   Tobacco Use    Smoking status: Never    Smokeless tobacco: Never   Vaping Use    Vaping Use: Never used   Substance and Sexual Activity    Alcohol use: Never    Drug use: No    Sexual activity: Not Currently     Comment:    Other Topics Concern    Not on file   Social History Narrative    Housing, household, and economic circumstances      Social Determinants of Health     Financial Resource Strain: Low Risk  (8/7/2023)    Overall Financial Resource Strain (CARDIA)     Difficulty of Paying Living Expenses: Not very hard   Food Insecurity: No Food Insecurity (8/7/2023)    Hunger Vital Sign     Worried About Running Out of Food in the Last Year: Never true     Ran Out of Food in the Last Year: Never true   Transportation Needs: No Transportation Needs (8/7/2023)    PRAPARE - Transportation     Lack of Transportation (Medical): No     Lack of Transportation (Non-Medical): No   Physical Activity: Inactive (4/14/2021)    Exercise Vital Sign     Days of Exercise per Week: 0 days     Minutes of Exercise per Session: 0 min   Stress: No Stress Concern Present (4/14/2021)    [facility]     Feeling of Stress : Not at all   Social Connections: Moderately Isolated (4/14/2021)    Social Connection and Isolation Panel [NHANES]     Frequency of Communication with Friends and Family: More than three times a week     Frequency of Social Gatherings with Friends and Family: More than three times a week     Attends Islam Services: More than 4 times per year     Active Member of [employer] or Organizations: No     Attends Club or Organization Meetings: Never     Marital Status:     Intimate Partner Violence: Not At Risk (4/14/2021)    Humiliation, Afraid, Rape, and Kick questionnaire     Fear of Current or Ex-Partner: No     Emotionally Abused: No     Physically Abused: No     Sexually Abused: No   Housing Stability: Unknown (8/13/2022)    Housing Stability Vital Sign     Unable to Pay for Housing in the Last Year: No     Number of [address] in the Last Year: Not on file     Unstable Housing in the Last Year: No       Family History   Problem Relation Age of Onset    Diabetes Mother     Breast cancer Sister 48    No Known Problems Father     No Known Problems Maternal Grandmother     No Known Problems Maternal Grandfather     No Known Problems Paternal Grandmother     No Known Problems Paternal Grandfather     No Known Problems Daughter     No Known Problems Son     No Known Problems Sister     No Known Problems Sister     No Known Problems Brother     No Known Problems Brother     No Known Problems Sister     No Known Problems Paternal Aunt     No Known Problems Paternal Aunt     No Known Problems Paternal Aunt     No Known Problems Paternal Aunt        Physical Exam:  Vitals:    11/01/23 0853   BP: 140/78   BP Location: Left arm   Patient Position: Sitting   Cuff Size: Standard   Pulse: 78   SpO2: 98%   Weight: 67.1 kg (147 lb 14.4 oz)   Height: 5' 3" (1.6 m)         Constitutional: NAD, non toxic  Ears/nose/mouth/throat: atraumatic  CV: RRR, no JVD  Resp: ctabl  GI: Soft, NTND  MSK: no swollen joints in exposed areas  Extr: +1 LE edema L>R (since CVA), warm LE  Pysche: Normal affect  Neuro: appropriate in conversation  Skin: dry and intact in exposed areas    Labs & Results:    Lab Results   Component Value Date    SODIUM 141 10/25/2023    K 3.9 10/25/2023     10/25/2023    CO2 26 10/25/2023    BUN 17 10/25/2023    CREATININE 0.93 10/25/2023    GLUC 86 09/12/2022    CALCIUM 9.1 10/25/2023     Lab Results   Component Value Date    WBC 6.74 08/30/2023    HGB 11.3 (L) 08/30/2023    HCT 36.4 08/30/2023    MCV 98 08/30/2023     08/30/2023     Lab Results   Component Value Date     (H) 08/30/2023      Lab Results   Component Value Date    CHOLESTEROL 118 08/30/2023    CHOLESTEROL 100 08/13/2022    CHOLESTEROL 149 08/16/2021     Lab Results   Component Value Date    HDL 52 08/30/2023    HDL 48 (L) 08/13/2022    HDL 62 08/16/2021     Lab Results   Component Value Date    TRIG 78 08/30/2023    TRIG 71 08/13/2022    TRIG 101 08/16/2021     Lab Results   Component Value Date    [address] 08/13/2022    [address] 08/16/2021    [address] 08/18/2020       Counseling / Coordination of Care  Time spent today 28 minutes.   Greater than 50% of total time was spent with the patient and / or family counseling and / or coordination of care. We discussed diagnoses, most recent studies, tests and any changes in treatment plan. Thank you for the opportunity to participate in the care of this patient.     Philomena Nageotte, MD  Attending Physician  Advanced Heart Failure and Transplant Cardiology  [facility]

## 2023-11-01 ENCOUNTER — IN-CLINIC DEVICE VISIT (OUTPATIENT)
Dept: CARDIOLOGY CLINIC | Facility: CLINIC | Age: 83
End: 2023-11-01
Payer: MEDICARE

## 2023-11-01 ENCOUNTER — OFFICE VISIT (OUTPATIENT)
Dept: CARDIOLOGY CLINIC | Facility: CLINIC | Age: 83
End: 2023-11-01
Payer: MEDICARE

## 2023-11-01 VITALS
DIASTOLIC BLOOD PRESSURE: 78 MMHG | WEIGHT: 147.9 LBS | BODY MASS INDEX: 26.21 KG/M2 | HEART RATE: 78 BPM | HEIGHT: 63 IN | SYSTOLIC BLOOD PRESSURE: 140 MMHG | OXYGEN SATURATION: 98 %

## 2023-11-01 DIAGNOSIS — Z95.0 CARDIAC PACEMAKER IN SITU: ICD-10-CM

## 2023-11-01 DIAGNOSIS — I50.30 HEART FAILURE WITH PRESERVED EJECTION FRACTION, UNSPECIFIED HF CHRONICITY (HCC): Primary | ICD-10-CM

## 2023-11-01 DIAGNOSIS — N18.31 STAGE 3A CHRONIC KIDNEY DISEASE (HCC): ICD-10-CM

## 2023-11-01 DIAGNOSIS — I10 ESSENTIAL HYPERTENSION: ICD-10-CM

## 2023-11-01 DIAGNOSIS — I51.7 LVH (LEFT VENTRICULAR HYPERTROPHY): ICD-10-CM

## 2023-11-01 DIAGNOSIS — M79.89 LEG SWELLING: ICD-10-CM

## 2023-11-01 DIAGNOSIS — Z95.0 PRESENCE OF PERMANENT CARDIAC PACEMAKER: Primary | ICD-10-CM

## 2023-11-01 PROCEDURE — 93279 PRGRMG DEV EVAL PM/LDLS PM: CPT | Performed by: INTERNAL MEDICINE

## 2023-11-01 PROCEDURE — 99214 OFFICE O/P EST MOD 30 MIN: CPT | Performed by: STUDENT IN AN ORGANIZED HEALTH CARE EDUCATION/TRAINING PROGRAM

## 2023-11-01 RX ORDER — SPIRONOLACTONE 25 MG/1
25 TABLET ORAL DAILY
Qty: 90 TABLET | Refills: 5 | Status: SHIPPED | OUTPATIENT
Start: 2023-11-01 | End: 2025-04-24

## 2023-11-01 NOTE — PROGRESS NOTES
Results for orders placed or performed in visit on 11/01/23   Cardiac EP device report    Narrative    MDT-SINGLE CHAMBER PPM / NOT MRI CONDITIONAL  DEVICE INTERROGATED IN THE [facility] OFFICE. BATTERY VOLTAGE ADEQUATE (8.4 YRS).  70.7% (>40%/VVIR 60) ALL LEAD PARAMETERS WITHIN NORMAL LIMITS. NO SIGNIFICANT HIGH RATE EPISODES. NO PROGRAMMING CHANGES MADE TO DEVICE PARAMETERS. NORMAL DEVICE FUNCTION.  AM/NC

## 2023-11-09 ENCOUNTER — PATIENT OUTREACH (OUTPATIENT)
Dept: INTERNAL MEDICINE CLINIC | Facility: CLINIC | Age: 83
End: 2023-11-09

## 2023-11-09 NOTE — PROGRESS NOTES
SW has returned call to pt via  ID # [id number]. Pt shares she has questions re the test that her  cardiologist has suggested re her high blood pressure. She said she has called the Office but no one has called her back. SW will ask the Clinical Team.    Pt is also asking for help to get more PA Waiver Hours. Pt is currently receiving 63 hours and she is not sure how many more to ask for and will speak to the agency supervisor and get back to SW re same. She has was last evaluated in May of this year. She has had several evaluations by her  Chani Lopez [phone] but it has not been increased. SW reviewed that we do not have control over there decision. SW has noted that the Waiver seem to be cutting back on pt's hours. Pt does NOT want to consider going to a nursing home. SW has offered to call her  with the request once she shares how many hours she is asking for. SW to assist as able.

## 2023-11-10 ENCOUNTER — TELEPHONE (OUTPATIENT)
Dept: INTERNAL MEDICINE CLINIC | Facility: CLINIC | Age: 83
End: 2023-11-10

## 2023-11-10 NOTE — TELEPHONE ENCOUNTER
Spoke with patient about BP/home concerns. Patient states right now her main concern is getting more hours with her home aid which Sabine Lind and her are discussing. After explaining the reasoning for the VAS on 11/15-patient is okay with going for exam. Patient will then be seen by Dr. Aliyah Iraheta and then myself in clinic after. All questions answered.

## 2023-11-10 NOTE — TELEPHONE ENCOUNTER
Patient called to ask for new order for PT for her left leg since her stroke. She said she was receiving the PT before but had a lapse in the appointments due to having too many other appointments. She is also concerned because she saw Cardiology and they said her BP is all over the place. She is trying to have services in the home to have someone check her BP. I advised her that I am not sure that [facility] will come out for BP checks as that is not considered a skilled nursing service. She did say that she is working with Ann Antonio to have other services for the waiver program since she is having trouble bathing and dressing herself since the stroke. I will follow up with Ann Antonio regarding that     Can you please reach out to the patient to discuss her BP and her Cardiology appointment. She is very concerned that it is not controlled and worried about another stroke.

## 2023-11-13 ENCOUNTER — APPOINTMENT (OUTPATIENT)
Dept: LAB | Facility: CLINIC | Age: 83
End: 2023-11-13
Payer: MEDICARE

## 2023-11-13 DIAGNOSIS — I50.30 HEART FAILURE WITH PRESERVED EJECTION FRACTION, UNSPECIFIED HF CHRONICITY (HCC): ICD-10-CM

## 2023-11-13 LAB
ANION GAP SERPL CALCULATED.3IONS-SCNC: 7 MMOL/L
BUN SERPL-MCNC: 29 MG/DL (ref 5–25)
CALCIUM SERPL-MCNC: 9.5 MG/DL (ref 8.4–10.2)
CHLORIDE SERPL-SCNC: 104 MMOL/L (ref 96–108)
CO2 SERPL-SCNC: 28 MMOL/L (ref 21–32)
CREAT SERPL-MCNC: 1.33 MG/DL (ref 0.6–1.3)
GFR SERPL CREATININE-BSD FRML MDRD: 36 ML/MIN/1.73SQ M
GLUCOSE P FAST SERPL-MCNC: 110 MG/DL (ref 65–99)
POTASSIUM SERPL-SCNC: 4 MMOL/L (ref 3.5–5.3)
SODIUM SERPL-SCNC: 139 MMOL/L (ref 135–147)

## 2023-11-13 PROCEDURE — 36415 COLL VENOUS BLD VENIPUNCTURE: CPT

## 2023-11-13 PROCEDURE — 80048 BASIC METABOLIC PNL TOTAL CA: CPT

## 2023-11-15 ENCOUNTER — HOSPITAL ENCOUNTER (OUTPATIENT)
Dept: NON INVASIVE DIAGNOSTICS | Facility: CLINIC | Age: 83
Discharge: HOME | End: 2023-11-15
Payer: MEDICARE

## 2023-11-15 ENCOUNTER — HOSPITAL ENCOUNTER (OUTPATIENT)
Dept: MAMMOGRAPHY | Facility: HOSPITAL | Age: 83
Discharge: HOME | End: 2023-11-15
Payer: MEDICARE

## 2023-11-15 VITALS — HEIGHT: 63 IN | WEIGHT: 147 LBS | BODY MASS INDEX: 26.05 KG/M2

## 2023-11-15 DIAGNOSIS — Z12.31 SCREENING MAMMOGRAM FOR BREAST CANCER: ICD-10-CM

## 2023-11-15 DIAGNOSIS — M79.89 LEG SWELLING: ICD-10-CM

## 2023-11-15 PROCEDURE — 77063 BREAST TOMOSYNTHESIS BI: CPT

## 2023-11-15 PROCEDURE — 77067 SCR MAMMO BI INCL CAD: CPT

## 2023-11-15 PROCEDURE — 93970 EXTREMITY STUDY: CPT

## 2023-11-15 PROCEDURE — 93970 EXTREMITY STUDY: CPT | Performed by: SURGERY

## 2023-11-21 ENCOUNTER — TELEPHONE (OUTPATIENT)
Dept: CARDIOLOGY CLINIC | Facility: CLINIC | Age: 83
End: 2023-11-21

## 2023-11-21 NOTE — TELEPHONE ENCOUNTER
Called patient and gave results. ----- Message from Jadyn St MD sent at 11/20/2023  2:45 PM EST -----  Please notify pt her doppler study was normal. Good news.       Thanks!    - Krystyna Waters

## 2023-11-21 NOTE — PROGRESS NOTES
Advanced Heart Failure/Pulmonary Hypertension Outpatient Note - Flor Moyer 80 y.o. female MRN: [id number]    @ Encounter: [id number]    Assessment:  80 y.o. female Hx per chart p/w HF fu. I first met patient 8/7/23. Follows primarily with EP Dr. Sherre Koyanagi  AF on [facility]  HFpEF, RV size/fxn wnl  AL amyloid screen was neg  PYP scan was not suggestive of cardiac amyloidosis  CA unlikely at this point  [id number] [address]. +RVOT notching. Significant group II contribution likely. 2023 echo with suggestion of borderline elevated filling pressures, worse on R. Note the maxTRV is measured overly generously. RVSP was borderline elevated by echo. Mild-mod AI  Mild AS  Mild MR  CVA with L hemiparesis  Tachybrady syndrome s/p S-PPM, VVI pacemaker. Per chart:  "She had left sided erosion remotely and lead extracted. Paces up to 76 % since they increased bystolic"  Nonobstructive CAD. Had [address] for borderline stress test 50% RCA and nonobstructive. HTN  CKD  Anemia  Vit B12 deficient and gets injections      I have reviewed all pertinent patient data including but not limited to:    Lab Results   Component Value Date    CREATININE 1.33 (H) 11/13/2023     Lab Results   Component Value Date    K 4.0 11/13/2023     Lab Results   Component Value Date    HGBA1C 5.4 08/13/2022     Lab Results   Component Value Date    TES5PNCPIXWJ 5.777 (H) 08/30/2023     Lab Results   Component Value Date    LDLCALC 50 08/30/2023     Lab Results   Component Value Date     (H) 08/30/2023      Lab Results   Component Value Date    NTBNP 778 (H) 08/09/2021      Serum immunofixation shows no monoclonal immunoglobulins.    UPEP: no M spike  FLA ratio 2.2 - minimally elevated absolute numbers and wnl for CKD  Remote RF and anti-CCP both wnl    Our last encounter:  11/1/2023  TODAY'S PLAN:     11/22/23  Warm, euvolemic  No new cardiac complaints, feels generally well and significantly better than in recent weeks - less SOB and less edema, better energy  We have made multiple escalations of her cardiac regimen recently  Dopplers neg for DVT  BP acceptable    Slight Cr rise w aldactone - hydrate  K acceptable  Decrease diuretic today - on lasix 40 bid>now will reduce to 40 qd    No other med changes today    Fu 3 mo on escalated Rx; also get new echo for EF given vpacing burden at that time  If Sx are limiting then may consider RHC  If RHC ok but ongoing limiting Sx consider CRT upgrade for RV pacing 70%, wide LBBB morphology, and Sx    She may pursue home nurses and PT with her PCP    Cw aldactone 25 qd  Cw lasix as above  cw losartan 100 qd (increased from prior)  Tolerating new  jardiance 10 qd; discussed risks/benefits/red flags/when to call us; no overt contraindications    On nebivolol 5 qd and verapamil 240 qd    On AC    On statin    Key info from my prior notes:    HR in 70s now, heavy V pacing burden, minimal room for liberalization it seems  Fu EP  Extensive vpacing - monitor symptoms after diuresis and BP control, recheck echo and consider med change vs biv upgrade pending course (complex in her given age, frailty, past PPM lead extraction issues)    I am meeting patient for the first time today  Warm, mildly vol up on exam  Lasix recently doubled to lasix 20 bid from qd - within past week - making more urine, she feels slightly better now  Her main complaint is fatigue and SOB, sometimes profound, worse over past year since CVA and also after covid vaccine she says  High RV pacing noted  Office and her home SBP consistently in 130-140s, she keeps good log at home    Her symptoms are likely multifactorial    Limited role for RHC at present time  May reconsider pending Tx as above    Her echo and clinical Hx suggests possible infiltrative CM such as cardiac amyloidosis  Of course she certainly has chronic HTN that can explain similar findings  Will pursue CA workup now - AL screen and pyp ordered  We discussed possible additional questions these tests may raise and potential need for more downstream tests if this leads us to confusion    Serum immunofixation shows no monoclonal immunoglobulins. UPEP: no M spike  FLA ratio 2.2 - minimally elevated absolute numbers and wnl for CKD  pyp without evidence of cardiac amyloidosis  No further cardiac amyloidosis workup planned now, data points away    PPM,  ICD , CRT (if applicable): Interrogation:  11/1/23:  MDT-SINGLE CHAMBER PPM / NOT MRI CONDITIONAL   DEVICE INTERROGATED IN THE BETEHEM OFFICE. BATTERY VOLTAGE ADEQUATE (8.4 YRS).  70.7% (>40%/VVIR 60) ALL LEAD PARAMETERS WITHIN NORMAL LIMITS. NO SIGNIFICANT HIGH RATE EPISODES. NO PROGRAMMING CHANGES MADE TO DEVICE PARAMETERS. NORMAL DEVICE FUNCTION. Studies:  I have reviewed all pertinent patient data/labs/imaging where available, including but not limited to the below studies. Selected results may be displayed here but comprehensive listing is omitted for note clarity and can be found in the epic chart. ECG. Echo. Stress. Cath. HPI:   80 y.o. female Hx per chart p/w HF fu.  No new CP/SOB/dizziness/palpitations/syncope. No new fatigue. No new unintentional weight changes. No new leg swelling, PND, pillow orthopnea. No new fevers, chills, cough, nausea, vomiting, diarrhea, dysuria. Interval History:   As noted in 'plan' section above and prior epic chart notes. No new CP/SOB/dizziness/palpitations/syncope. No new fatigue. No new unintentional weight changes. No new leg swelling, PND, pillow orthopnea. No new fevers, chills, cough, nausea, vomiting, diarrhea, dysuria. Past Medical History:   Diagnosis Date    A-fib ([address])     Anemia     Arthritis     Breast pain, right     Chest pain on breathing     Colon polyp     Cough     Diverticulosis     Encounter for screening colonoscopy     H/O sick sinus syndrome     Heart murmur     High cholesterol     History of atrial fibrillation     Rate ontrolled.  On xarelto 15mg (creatinine 50) Followed by Dr. Jason Alvarez with Cardiology     History of weight loss     Report loose fitting clothes. Weight stable (3lbs difference). Last colo showed small tubular adenoma x2. Breast biopsy last showed fibrocystic changes. TSH wnl. Will check cbc, bmp, spep.         Hx of long term use of blood thinners     Hypertension     Irregular heart beat     RICH (obstructive sleep apnea)     Pacemaker     Preop examination     Shortness of breath     Viral bronchitis      Patient Active Problem List    Diagnosis Date Noted    Dyspnea 08/07/2023    Foot drop, left foot 03/27/2023    RICH (obstructive sleep apnea)     Pulmonary hypertension ([address]) 11/23/2022    Belching 10/05/2022    Transient neurological symptoms 09/08/2022    Thrush 09/06/2022    At risk for delirium 09/06/2022    Valvular heart disease 08/20/2022    CAD (coronary artery disease) 08/20/2022    Urinary retention 08/20/2022    Neck pain 08/20/2022    Adjustment disorder with mixed emotional features 08/20/2022    Anemia 08/19/2022    At risk for venous thromboembolism (VTE) 08/19/2022    DARRIUS (acute kidney injury) ([address]) 08/19/2022    Spastic hemiparesis of left dominant side as late effect of cerebral infarction ([address]) 08/19/2022    History of CVA (cerebrovascular accident) 08/12/2022    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 ([address]) 08/12/2022    Renal insufficiency     Abnormal nuclear stress test 08/20/2021    Diverticulosis of colon 05/20/2019    Pacemaker 02/26/2018    Tubulovillous adenoma 02/09/2018    Gastroesophageal reflux disease without esophagitis 02/09/2018    Other chest pain 12/08/2017    Essential hypertension 12/08/2017    Hyperlipidemia LDL goal <100 12/08/2017    Stage 3 chronic kidney disease ([address]) 12/08/2017    Osteoarthritis of both wrists 12/08/2017    Chronic atrial fibrillation ([address]) 12/08/2017    Cavus deformity of foot 06/27/2017    Hallux abducto valgus, bilateral 06/27/2017    Lipoma of right upper extremity 05/17/2017 Pain due to onychomycosis of toenails of both feet 01/20/2017    Allergic rhinitis due to pollen 10/12/2015    Midline cystocele 12/23/2014    Osteoarthritis of hip 07/17/2014    B12 deficiency 12/06/2013    Osteopenia 11/15/2013    Left renal artery stenosis ([address]) 08/27/2013    Left atrial enlargement 08/27/2013       ROS:  10 point ROS negative except as specified in HPI/interval history    Allergies   Allergen Reactions    Other Itching     Bandaids    Tape [Medical Tape] Itching       Current Outpatient Medications   Medication Instructions    acetaminophen (TYLENOL) 650 mg, Oral, Every 6 hours PRN    ammonium lactate (LAC-HYDRIN) 12 % cream Topical, As needed    Blood Pressure Monitoring (Blood Pressure Cuff) MISC Does not apply, Every other day, For HTN    cyanocobalamin 1,000 mcg, Intramuscular, Every 30 days    Empagliflozin (JARDIANCE) 10 mg, Oral, Every morning    furosemide (LASIX) 40 mg, Oral, Daily    Incontinence Supply Disposable (RA Adult Wipes) MISC Does not apply, 4 times daily    losartan (COZAAR) 100 mg, Oral, Daily    melatonin 3 mg, Oral, Every evening    menthol-methyl salicylate (BENGAY) 48-18 % cream Apply externally, 2 times daily    nebivolol (BYSTOLIC) 5 mg, Oral, Daily    nitroglycerin (NITROSTAT) 0.4 mg, Sublingual, Every 5 minutes PRN    pantoprazole (PROTONIX) 40 mg, Oral, Daily (early morning)    Pradaxa 150 MG capsu TAKE 1 CAPSULE (150 MG TOTAL) BY MOUTH 2 (TWO) TIMES A DAY    rosuvastatin (CRESTOR) 5 mg, Oral, Daily    spironolactone (ALDACTONE) 25 mg, Oral, Daily    verapamil (CALAN-SR) 240 mg, Oral, Daily at bedtime        Social History     Socioeconomic History    Marital status:       Spouse name: Not on file    Number of children: Not on file    Years of education: Not on file    Highest education level: Not on file   Occupational History    Occupation: retired   Tobacco Use    Smoking status: Never    Smokeless tobacco: Never   Vaping Use    Vaping Use: Never used Substance and Sexual Activity    Alcohol use: Never    Drug use: No    Sexual activity: Not Currently     Comment:    Other Topics Concern    Not on file   Social History Narrative    Housing, household, and economic circumstances      Social Determinants of Health     Financial Resource Strain: Low Risk  (8/7/2023)    Overall Financial Resource Strain (CARDIA)     Difficulty of Paying Living Expenses: Not very hard   Food Insecurity: No Food Insecurity (8/7/2023)    Hunger Vital Sign     Worried About Running Out of Food in the Last Year: Never true     Ran Out of Food in the Last Year: Never true   Transportation Needs: No Transportation Needs (8/7/2023)    PRAPARE - Transportation     Lack of Transportation (Medical): No     Lack of Transportation (Non-Medical): No   Physical Activity: Inactive (4/14/2021)    Exercise Vital Sign     Days of Exercise per Week: 0 days     Minutes of Exercise per Session: 0 min   Stress: No Stress Concern Present (4/14/2021)    [facility]     Feeling of Stress : Not at all   Social Connections: Moderately Isolated (4/14/2021)    Social Connection and Isolation Panel [NHANES]     Frequency of Communication with Friends and Family: More than three times a week     Frequency of Social Gatherings with Friends and Family: More than three times a week     Attends Alevism Services: More than 4 times per year     Active Member of [employer] Group or Organizations: No     Attends Club or Organization Meetings: Never     Marital Status:     Intimate Partner Violence: Not At Risk (4/14/2021)    Humiliation, Afraid, Rape, and Kick questionnaire     Fear of Current or Ex-Partner: No     Emotionally Abused: No     Physically Abused: No     Sexually Abused: No   Housing Stability: Unknown (8/13/2022)    Housing Stability Vital Sign     Unable to Pay for Housing in the Last Year: No     Number of [address] in the Last Year: Not on file     Unstable Housing in the Last Year: No       Family History   Problem Relation Age of Onset    Diabetes Mother     Breast cancer Sister 48    No Known Problems Father     No Known Problems Maternal Grandmother     No Known Problems Maternal Grandfather     No Known Problems Paternal Grandmother     No Known Problems Paternal Grandfather     No Known Problems Daughter     No Known Problems Son     No Known Problems Sister     No Known Problems Sister     No Known Problems Brother     No Known Problems Brother     No Known Problems Sister     No Known Problems Paternal Aunt     No Known Problems Paternal Aunt     No Known Problems Paternal Aunt     No Known Problems Paternal Aunt        Physical Exam:  Vitals:    11/22/23 1058   BP: 132/70   BP Location: Left arm   Patient Position: Sitting   Cuff Size: Standard   Pulse: 79   SpO2: 98%   Weight: 63.1 kg (139 lb 3.2 oz)   Height: 5' 3" (1.6 m)           Constitutional: NAD, non toxic  Ears/nose/mouth/throat: atraumatic  CV: RRR, no JVD  Resp: ctabl  GI: Soft, NTND  MSK: no swollen joints in exposed areas  Extr: no LE edema L>R (since CVA), warm LE  Pysche: Normal affect  Neuro: appropriate in conversation  Skin: dry and intact in exposed areas    Labs & Results:    Lab Results   Component Value Date    SODIUM 139 11/13/2023    K 4.0 11/13/2023     11/13/2023    CO2 28 11/13/2023    BUN 29 (H) 11/13/2023    CREATININE 1.33 (H) 11/13/2023    GLUC 86 09/12/2022    CALCIUM 9.5 11/13/2023     Lab Results   Component Value Date    WBC 6.74 08/30/2023    HGB 11.3 (L) 08/30/2023    HCT 36.4 08/30/2023    MCV 98 08/30/2023     08/30/2023     Lab Results   Component Value Date     (H) 08/30/2023      Lab Results   Component Value Date    CHOLESTEROL 118 08/30/2023    CHOLESTEROL 100 08/13/2022    CHOLESTEROL 149 08/16/2021     Lab Results   Component Value Date    HDL 52 08/30/2023    HDL 48 (L) 08/13/2022    HDL 62 08/16/2021     Lab Results Component Value Date    TRIG 78 08/30/2023    TRIG 71 08/13/2022    TRIG 101 08/16/2021     Lab Results   Component Value Date    [address] 08/13/2022    [address] 08/16/2021    [address] 08/18/2020       Counseling / Coordination of Care  Time spent today 27 minutes. Greater than 50% of total time was spent with the patient and / or family counseling and / or coordination of care. We discussed diagnoses, most recent studies, tests and any changes in treatment plan. Thank you for the opportunity to participate in the care of this patient.     Payton Lezama MD  Attending Physician  Advanced Heart Failure and Transplant Cardiology  [address]

## 2023-11-22 ENCOUNTER — OFFICE VISIT (OUTPATIENT)
Dept: CARDIOLOGY CLINIC | Facility: CLINIC | Age: 83
End: 2023-11-22
Payer: MEDICARE

## 2023-11-22 VITALS
OXYGEN SATURATION: 98 % | HEART RATE: 79 BPM | WEIGHT: 139.2 LBS | SYSTOLIC BLOOD PRESSURE: 132 MMHG | DIASTOLIC BLOOD PRESSURE: 70 MMHG | BODY MASS INDEX: 24.66 KG/M2 | HEIGHT: 63 IN

## 2023-11-22 DIAGNOSIS — I27.20 PULMONARY HYPERTENSION (HCC): Primary | ICD-10-CM

## 2023-11-22 DIAGNOSIS — Z95.0 CARDIAC PACEMAKER IN SITU: ICD-10-CM

## 2023-11-22 DIAGNOSIS — I51.7 LVH (LEFT VENTRICULAR HYPERTROPHY): ICD-10-CM

## 2023-11-22 DIAGNOSIS — I10 HYPERTENSION, UNSPECIFIED TYPE: ICD-10-CM

## 2023-11-22 DIAGNOSIS — I48.20 CHRONIC ATRIAL FIBRILLATION (HCC): ICD-10-CM

## 2023-11-22 PROCEDURE — 99214 OFFICE O/P EST MOD 30 MIN: CPT | Performed by: STUDENT IN AN ORGANIZED HEALTH CARE EDUCATION/TRAINING PROGRAM

## 2023-11-22 RX ORDER — FUROSEMIDE 20 MG/1
40 TABLET ORAL DAILY
Qty: 180 TABLET | Refills: 5 | Status: SHIPPED | OUTPATIENT
Start: 2023-11-22 | End: 2025-05-15

## 2023-11-24 ENCOUNTER — OFFICE VISIT (OUTPATIENT)
Dept: INTERNAL MEDICINE CLINIC | Facility: CLINIC | Age: 83
End: 2023-11-24

## 2023-11-24 VITALS
BODY MASS INDEX: 24.8 KG/M2 | SYSTOLIC BLOOD PRESSURE: 148 MMHG | DIASTOLIC BLOOD PRESSURE: 66 MMHG | OXYGEN SATURATION: 97 % | HEART RATE: 84 BPM | TEMPERATURE: 98 F | WEIGHT: 140 LBS

## 2023-11-24 DIAGNOSIS — I10 ESSENTIAL HYPERTENSION: Primary | ICD-10-CM

## 2023-11-24 DIAGNOSIS — Z86.73 HISTORY OF CVA (CEREBROVASCULAR ACCIDENT): ICD-10-CM

## 2023-11-24 DIAGNOSIS — G47.33 OSA (OBSTRUCTIVE SLEEP APNEA): ICD-10-CM

## 2023-11-24 DIAGNOSIS — I48.20 CHRONIC ATRIAL FIBRILLATION (HCC): ICD-10-CM

## 2023-11-24 PROCEDURE — 99213 OFFICE O/P EST LOW 20 MIN: CPT | Performed by: INTERNAL MEDICINE

## 2023-11-24 PROCEDURE — 3077F SYST BP >= 140 MM HG: CPT | Performed by: INTERNAL MEDICINE

## 2023-11-24 PROCEDURE — 3078F DIAST BP <80 MM HG: CPT | Performed by: INTERNAL MEDICINE

## 2023-11-24 NOTE — ASSESSMENT & PLAN NOTE
Still has residual effects of CVA. LLE weakness that is affecting her mobility and functional status. Strength in other extremities is 5/5, but overall ambulatory dysfunction from CVA.      -PT   -Re-evaluation on Monday for hopefully extended care-if this is denied-will reach out to SW to determine if other options are available

## 2023-11-24 NOTE — ASSESSMENT & PLAN NOTE
Nocturnal symptoms of SOB and waking up SOB.  Daytime tiredness.   -Sleep study planned for 1/2024  -Should get CPAP at this appt

## 2023-11-24 NOTE — ASSESSMENT & PLAN NOTE
Patient has very labile BP. Today 148/66. Currently on Losartan, Bystolic, Verapamil, and aldactone per HF.  HF has been managing and titrating the BP medications as necessary.    -Will continue current regimen  -Sleep study 1/2024

## 2023-11-24 NOTE — PROGRESS NOTES
[address]   INTERNAL MEDICINE OFFICE VISIT     PATIENT INFORMATION     Zita London   80 y.o. female   MRN: [id number]    ASSESSMENT/PLAN     Problem List Items Addressed This Visit       Essential hypertension - Primary     Patient has very labile BP. Today 148/66. Currently on Losartan, Bystolic, Verapamil, and aldactone per HF. HF has been managing and titrating the BP medications as necessary.    -Will continue current regimen  -Sleep study 1/2024         Chronic atrial fibrillation (HCC)     Currently on Pradaxa         History of CVA (cerebrovascular accident)     Still has residual effects of CVA. LLE weakness that is affecting her mobility and functional status. Strength in other extremities is 5/5, but overall ambulatory dysfunction from CVA. -PT   -Re-evaluation on Monday for hopefully extended care-if this is denied-will reach out to  to determine if other options are available         Relevant Orders    Ambulatory Referral to Physical Therapy    RICH (obstructive sleep apnea)     Nocturnal symptoms of SOB and waking up SOB. Daytime tiredness.   -Sleep study planned for 1/2024  -Should get CPAP at this appt          Schedule a follow-up appointment in 3 months.     HEALTH MAINTENANCE     Immunization History   Administered Date(s) Administered    COVID-19 MODERNA VACC 0.5 ML IM 02/19/2021, 05/27/2021    COVID-19 Pfizer Vac BIVALENT Jermaine-sucrose 12 Yr+ IM 09/09/2022    INFLUENZA 10/11/2005, 10/24/2006, 10/15/2007, 09/28/2015, 10/06/2016, 10/18/2017, 10/05/2022    Influenza Quadrivalent Preservative Free 3 years and older IM 10/06/2016    Influenza Quadrivalent, 6-35 Months IM 10/18/2017    Influenza, high dose seasonal 0.7 mL 10/12/2018, 09/23/2019, 09/23/2020, 09/22/2021, 10/05/2022    Influenza, seasonal, injectable 09/24/2013, 09/24/2014, 09/28/2015    Pneumococcal Conjugate 13-Valent 05/31/2016    Pneumococcal Polysaccharide PPV23 01/01/2001, 12/18/2019    Tdap 03/12/2014     CHIEF COMPLAINT     Chief Complaint   Patient presents with    Follow-up      HISTORY OF PRESENT ILLNESS     Patient is an 77-year-old with past medical history of hypertension, CKD 3, CVA, pacemaker (tacky/bradycardia syndrome), CAD, pulmonary hypertension, RICH. Patient is returning for 3-month follow-up. Patient was seen 3 months ago for shortness of breath and later sent to heart failure due to appearing volume overloaded on exam as well as echo findings. Cardiology did extensive work-up including amyloidosis and immunofixation which was negative. Patient is currently taking her medications as prescribed. Of note, patient has significant difficulties with ADLs. She does have a home health aide that comes in from 2 PM to 11 PM every day but in the morning she has significant difficulty dressing/bathing/cooking for herself. REVIEW OF SYSTEMS     Review of Systems   Constitutional:  Positive for fatigue. HENT: Negative. Respiratory:  Positive for shortness of breath. Cardiovascular:  Negative for chest pain, palpitations and leg swelling. Gastrointestinal:  Negative for abdominal pain, constipation, diarrhea, nausea and vomiting. Musculoskeletal: Negative. Neurological:  Negative for dizziness, weakness and headaches. Psychiatric/Behavioral: Negative. OBJECTIVE     Vitals:    11/24/23 0947   BP: 148/66   BP Location: Left arm   Patient Position: Sitting   Cuff Size: Standard   Pulse: 84   Temp: 98 °F (36.7 °C)   TempSrc: Temporal   SpO2: 97%   Weight: 63.5 kg (140 lb)     Physical Exam  Vitals reviewed. Constitutional:       Appearance: Normal appearance. HENT:      Head: Normocephalic and atraumatic. Mouth/Throat:      Mouth: Mucous membranes are moist.      Pharynx: Oropharynx is clear. Cardiovascular:      Rate and Rhythm: Normal rate. Rhythm irregular. Pulmonary:      Effort: Pulmonary effort is normal.      Breath sounds: Normal breath sounds. Abdominal:      General: Abdomen is flat. Bowel sounds are normal.   Musculoskeletal:      Right lower leg: No edema. Left lower leg: Edema present. Skin:     General: Skin is warm and dry. Neurological:      Mental Status: She is alert and oriented to person, place, and time.       Comments: LLE 3/5, other extremities 5/5   Psychiatric:         Mood and Affect: Mood normal.         Behavior: Behavior normal.       CURRENT MEDICATIONS     Current Outpatient Medications:     acetaminophen (TYLENOL) 325 mg tablet, Take 2 tablets (650 mg total) by mouth every 6 (six) hours as needed for mild pain (Patient not taking: Reported on 8/15/2023), Disp: , Rfl: 0    ammonium lactate (LAC-HYDRIN) 12 % cream, APPLY TOPICALLY AS NEEDED FOR DRY SKIN, Disp: 385 g, Rfl: 1    Blood Pressure Monitoring (Blood Pressure Cuff) MISC, Use every other day For HTN (Patient not taking: Reported on 8/15/2023), Disp: 1 each, Rfl: 0    cyanocobalamin 1,000 mcg/mL, INJECT 1 ML (1,000 MCG TOTAL) INTO A MUSCLE EVERY 30 (THIRTY) DAYS, Disp: 3 mL, Rfl: 0    Empagliflozin (Jardiance) 10 MG TABS tablet, Take 1 tablet (10 mg total) by mouth every morning, Disp: 90 tablet, Rfl: 5    furosemide (LASIX) 20 mg tablet, Take 2 tablets (40 mg total) by mouth daily, Disp: 180 tablet, Rfl: 5    Incontinence Supply Disposable (RA Adult Wipes) MISC, Use 4 (four) times a day, Disp: 64 each, Rfl: 10    losartan (COZAAR) 50 mg tablet, Take 2 tablets (100 mg total) by mouth daily, Disp: 180 tablet, Rfl: 5    melatonin 3 mg, TAKE 1 TABLET (3 MG TOTAL) BY MOUTH EVERY EVENING (Patient not taking: Reported on 11/1/2023), Disp: 90 tablet, Rfl: 3    menthol-methyl salicylate (BENGAY) 22-30 % cream, Apply topically 2 (two) times a day (Patient not taking: Reported on 11/22/2023), Disp: , Rfl: 0    nebivolol (BYSTOLIC) 5 mg tablet, TAKE 1 TABLET (5 MG TOTAL) BY MOUTH DAILY, Disp: 90 tablet, Rfl: 3    nitroglycerin (NITROSTAT) 0.4 mg SL tablet, Place 1 tablet (0.4 mg total) under the tongue every 5 (five) minutes as needed for chest pain (Patient not taking: Reported on 8/3/2023), Disp: , Rfl: 0    pantoprazole (PROTONIX) 40 mg tablet, TAKE 1 TABLET (40 MG TOTAL) BY MOUTH DAILY IN THE EARLY MORNING, Disp: 90 tablet, Rfl: 3    Pradaxa 150 MG capsu, TAKE 1 CAPSULE (150 MG TOTAL) BY MOUTH 2 (TWO) TIMES A DAY, Disp: 180 capsule, Rfl: 3    rosuvastatin (CRESTOR) 5 mg tablet, Take 1 tablet (5 mg total) by mouth daily, Disp: 30 tablet, Rfl: 5    spironolactone (ALDACTONE) 25 mg tablet, Take 1 tablet (25 mg total) by mouth daily, Disp: 90 tablet, Rfl: 5    verapamil (CALAN-SR) 240 mg CR tablet, TAKE 1 TABLET (240 MG TOTAL) BY MOUTH DAILY AT BEDTIME, Disp: 90 tablet, Rfl: 3    Current Facility-Administered Medications:     cyanocobalamin injection 1,000 mcg, 1,000 mcg, Intramuscular, Q30 Days, Jignesh Cueva MD, 1,000 mcg at 09/18/23 0947    PAST MEDICAL & SURGICAL HISTORY     Past Medical History:   Diagnosis Date    A-fib (HCC)     Anemia     Arthritis     Breast pain, right     Chest pain on breathing     Colon polyp     Cough     Diverticulosis     Encounter for screening colonoscopy     H/O sick sinus syndrome     Heart murmur     High cholesterol     History of atrial fibrillation     Rate ontrolled. On xarelto 15mg (creatinine 50) Followed by Dr. Eloisa García with Cardiology     History of weight loss     Report loose fitting clothes. Weight stable (3lbs difference). Last colo showed small tubular adenoma x2. Breast biopsy last showed fibrocystic changes. TSH wnl. Will check cbc, bmp, spep.         Hx of long term use of blood thinners     Hypertension     Irregular heart beat     RICH (obstructive sleep apnea)     Pacemaker     Preop examination     Shortness of breath     Viral bronchitis      Past Surgical History:   Procedure Laterality Date    BREAST BIOPSY Right 08/17/2006    ultrasound guided Percutaneous Needle Core -Benign    CATARACT EXTRACTION      CATARACT EXTRACTION W/ INTRAOCULAR LENS  IMPLANT, BILATERAL      COLONOSCOPY      COLONOSCOPY N/A 05/22/2018    Procedure: COLONOSCOPY;  Surgeon: Yakov Hunt DO;  Location: [facility]; Service: Gastroenterology    INSERT / Mikal Rich / Ervin Ear      LEG SURGERY Right     as a child after a fall    MAMMO STEREOTACTIC BREAST BIOPSY RIGHT (ALL INC) Right 10/2014    benign    DC CMBND ANTERPOST COLPORRAPHY W/CYSTO N/A 03/17/2016    Procedure: COLPORRHAPHY ANTERIOR POSTERIOR ;  Surgeon: Loyd Jung MD;  Location[facility];  Service: Gynecology    DC COLONOSCOPY FLX DX W/COLLJ SPEC WHEN PFRMD N/A 04/04/2019    Procedure: COLONOSCOPY;  Surgeon: Yakov Hunt DO;  Location: [facility]; Service: Gastroenterology    DC COLPOPEXY VAGINAL EXTRAPERITONEAL APPROACH N/A 03/17/2016    Procedure: COLPOPEXY VAGINAL EXTRAPERITONEAL (VEC) ANTERIOR ;  Surgeon: Loyd Jung MD;  [facility];  Service: Gynecology    DC CYSTOURETHROSCOPY N/A 03/17/2016    Procedure: Jonh Maxwell;  Surgeon: Loyd Jung MD;  [facility];  Service: Gynecology    DC ESOPHAGOGASTRODUODENOSCOPY TRANSORAL DIAGNOSTIC N/A 04/16/2018    Procedure: EGD AND COLONOSCOPY;  Surgeon: Yakov Hunt DO;  Location: [facility]; Service: Gastroenterology    DC LAPAROSCOPY COLECTOMY PARTIAL W/ANASTOMOSIS Right 01/13/2022    Procedure: Diagnostic laparoscopy, laparscopic right colectomy;  Surgeon: Fred Carey MD;  [facility];  Service: Colorectal    DC SLING OPERATION STRESS INCONTINENCE N/A 03/17/2016    Procedure: INSERTION PUBOVAGINAL SLING SINGLE INCISION ;  Surgeon: Loyd Jung MD;  [facility];  Service: Gynecology     SOCIAL & FAMILY HISTORY     Social History     Socioeconomic History    Marital status:       Spouse name: Not on file    Number of children: Not on file    Years of education: Not on file    Highest education level: Not on file   Occupational History    Occupation: retired   Tobacco Use    Smoking status: Never    Smokeless tobacco: Never   Vaping Use    Vaping Use: Never used   Substance and Sexual Activity    Alcohol use: Never    Drug use: No    Sexual activity: Not Currently     Comment:    Other Topics Concern    Not on file   Social History Narrative    Housing, household, and economic circumstances      Social Determinants of Health     Financial Resource Strain: Low Risk  (8/7/2023)    Overall Financial Resource Strain (CARDIA)     Difficulty of Paying Living Expenses: Not very hard   Food Insecurity: No Food Insecurity (8/7/2023)    Hunger Vital Sign     Worried About Running Out of Food in the Last Year: Never true     Ran Out of Food in the Last Year: Never true   Transportation Needs: No Transportation Needs (8/7/2023)    PRAPARE - Transportation     Lack of Transportation (Medical): No     Lack of Transportation (Non-Medical): No   Physical Activity: Inactive (4/14/2021)    Exercise Vital Sign     Days of Exercise per Week: 0 days     Minutes of Exercise per Session: 0 min   Stress: No Stress Concern Present (4/14/2021)    [facility]     Feeling of Stress : Not at all   Social Connections: Moderately Isolated (4/14/2021)    Social Connection and Isolation Panel [NHANES]     Frequency of Communication with Friends and Family: More than three times a week     Frequency of Social Gatherings with Friends and Family: More than three times a week     Attends [employer] Services: More than 4 times per year     Active Member of [employer] or Organizations: No     Attends Club or Organization Meetings: Never     Marital Status:     Intimate Partner Violence: Not At Risk (4/14/2021)    Humiliation, Afraid, Rape, and Kick questionnaire     Fear of Current or Ex-Partner: No     Emotionally Abused: No     Physically Abused: No     Sexually Abused: No   Housing Stability: Unknown (8/13/2022)    Housing Stability Vital Sign     Unable to Pay for Housing in the Last Year: No     Number of Places Lived in the Last Year: Not on file     Unstable Housing in the Last Year: No     Social History     Substance and Sexual Activity   Alcohol Use Never       Social History     Substance and Sexual Activity   Drug Use No     Social History     Tobacco Use   Smoking Status Never   Smokeless Tobacco Never     Family History   Problem Relation Age of Onset    Diabetes Mother     Breast cancer Sister 48    No Known Problems Father     No Known Problems Maternal Grandmother     No Known Problems Maternal Grandfather     No Known Problems Paternal Grandmother     No Known Problems Paternal Grandfather     No Known Problems Daughter     No Known Problems Son     No Known Problems Sister     No Known Problems Sister     No Known Problems Brother     No Known Problems Brother     No Known Problems Sister     No Known Problems Paternal Aunt     No Known Problems Paternal Aunt     No Known Problems Paternal Aunt     No Known Problems Paternal Aunt      ==  Terese Feldman MD  PGY-2  [facility] Internal Medicine [address] [place] [address]., [address], [address] [place] [address] Road  Office: [phone]  Fax: [phone]

## 2023-11-27 DIAGNOSIS — K21.9 GASTROESOPHAGEAL REFLUX DISEASE WITHOUT ESOPHAGITIS: Chronic | ICD-10-CM

## 2023-11-27 RX ORDER — PANTOPRAZOLE SODIUM 40 MG/1
40 TABLET, DELAYED RELEASE ORAL
Qty: 90 TABLET | Refills: 3 | Status: SHIPPED | OUTPATIENT
Start: 2023-11-27

## 2023-11-29 ENCOUNTER — EVALUATION (OUTPATIENT)
Dept: PHYSICAL THERAPY | Facility: CLINIC | Age: 83
End: 2023-11-29
Payer: MEDICARE

## 2023-11-29 DIAGNOSIS — Z86.73 HISTORY OF CVA (CEREBROVASCULAR ACCIDENT): ICD-10-CM

## 2023-11-29 PROCEDURE — 97110 THERAPEUTIC EXERCISES: CPT

## 2023-11-29 PROCEDURE — 97162 PT EVAL MOD COMPLEX 30 MIN: CPT

## 2023-11-29 NOTE — PROGRESS NOTES
PT Evaluation     Today's date: 2023  Patient name: Jose Novak  : 1940  MRN: [id number]  Referring provider: Esther Tubbs MD  Dx:   Encounter Diagnosis     ICD-10-CM    1. History of CVA (cerebrovascular accident)  Z86.73 Ambulatory Referral to Physical Therapy                     Assessment  Assessment details: Jose Novak is a pleasant 80 y.o. female who was referred to outpatient physical therapy secondary to ongoing balance concerns following CVA on 2022. Currently, she utilizes a rollator for all mobility. She presents with the goals to improve her balance and ease of bed mobility. PT examination findings include: abnormal gait deviations such as decreased foot clearance (L > R), flat foot initial contact, minor L hip circumduction for L LE advancement, decreased L stance phase; slow gait speed of 0.48 m/s compared to gender and age matched controls of 0.94 m/s; decreased LE strength and power shown by her 5x STS time; impaired balance demonstrated via her TUG time; and limited endurance given her 6 MWT distance of 532 ft compared to gender and age matched controls of 5 ft. These finding classify her as a high falls risk, a limited community ambulator, and decreases her safety with functional mobility. Time was spent educating her on walking more often and regularly throughout the day at home as well as review of previous HEP. She verbalized understanding and is in agreement with this plan. The patient would benefit from skilled physical therapy to provide exercises, neuromuscular reeducation, gait training as deemed necessary in order to help her achieve her goals and maximize her safety and independence to decrease her falls risk and improve her participation.          Impairments: abnormal coordination, abnormal gait, activity intolerance, impaired balance, impaired physical strength, lacks appropriate home exercise program, pain with function and safety issue  Understanding of Dx/Px/POC: good   Prognosis: good    Goals  STGs in 4 weeks  1. Pt will improve gait speed by at least 0.1 m/s with least restrictive device needed to improve safety with ambulation. 2. Pt will improve TUG time by at least 5 seconds to decrease fall risk and improve mobility. 3. Pt will improve 5xSTS score by at least 5 seconds needed to reduce fall risk. 4. Pt will improve 6MWT by at least 100 feet with appropriate AD to improve functional mobility and endurance. 5. Pt will be able to complete all bed mobility with no more than close supervision for improved independence. LTGs in 12 weeks  1. Pt will improve gait speed to at least 0.68 m/s with least restrictive device needed to improve safety with ambulation. 2. Pt will improve TUG score to <18 seconds to decrease fall risk and improve functional mobility. 3. Pt will improve 5xSTS score to <15 sec needed to reduce fall risk. 4. Pt will improve 6MWT to at least 750 feet with appropriate AD to improve functional mobility. 5. Pt will demonstrate independence with HEP within 12 weeks. Plan  Patient would benefit from: PT eval and skilled physical therapy  Planned therapy interventions: balance, neuromuscular re-education, patient education, orthotic management and training, strengthening, stretching, therapeutic activities, therapeutic exercise, gait training, graded activity, home exercise program and coordination  Frequency: 2x week  Duration in weeks: 12  Plan of Care beginning date: 11/29/2023  Plan of Care expiration date: 2/29/2024  Treatment plan discussed with: patient        Subjective Evaluation    History of Present Illness  Mechanism of injury: Josh Cabrales was previously seen at this facility following a CVA on 8/12/2022. She was seen from 10/2022 to 6/2023 and took a break from PT for various reasons. At the time of discharge, she was working on transitioning to [facility]; however, not confident using it at home.  She reports using rollator for all mobility. Since stopping therapy, she feels very tired and she is still having a lot of pain in her toe. She finds that her L leg goes out to the side when she walks. She notes that she has an AFO but can't wear it due to pain at the pinky toe. She notes that she also has a hard time getting in and out of bed, her son has to help her. She finds that her balance is bad when helping her caregiver with bathing and dressing. She denies any falls over the last year, did have a fall prior in the house. She lives in an apartment by herself with a caregiver that comes from 2 pm to 11 pm and her son comes in the morning to help. She no longer has to do stairs.    Patient Goals  Patient goals for therapy: increased strength and improved balance  Patient goal: easier get in and out bed  Pain  Current pain ratin  At worst pain ratin  Location: L foot    Social Support  Steps to enter house: no  Stairs in house: no   Lives in: apartment  Lives with: alone (has son visit and caregiver)          Objective    Vitals start of session: /68 mmHg; HR 72 bpm; SpO2 96%    Balance Test  IE    6 Minute Walk Test (ft):  795 ft with rollator (HR 94, SpO2 98%)   Functional Gait Assessment:  NT   Gait Speed (m/s):  10m/21.0s = 0.48 m/s with rollator   5x Sit To Stand (s):  19.9 (BUE use, asymmetrical foot placement, knees pushed back in chair)   TU.9 with rollator   ABC Scale:  13.75%       Coordination Left Right   LE placing  Slow  WNL     Sensation Left Right   Light Touch  Grossly intact  Grossly intact       Manual Muscle Testing - Hip Left Right   Flexion 3+ 4+   Abduction (seated) 3+ 4     Manual Muscle Testing - Knee Left Right   Flexion 3+ 4+   Extension 3+ 4+     Manual Muscle Testing - Ankle Left Right   Doriflexion 1 4+        Gait Assessment:  Decreased foot clearance (L > R), flat foot initial contact, minor L hip circumduction for L LE advancement, decreased L stance phase     Bed mobility:   Sit to supine: min A at L LE   Rolling: independent to L, min A to R  Supine to sit: close supervision (per pt varies at home)       Short Term Goal Expiration Date:(12/29/2023)  Long Term Goal Expiration Date: (2/29/2024)  POC Expiration Date: (2/29/2024)    Visit count: 1 of 10; PN due 12/29/2023      POC expires Unit limit Auth Expiration date PT/OT/ST + Visit Limit?    2/29/2024 BOMN TBD BOMN                             Visit/Unit Tracking  AUTH Status:  Date 11/29             TBD on IE Used 1 IE              Remaining                        Precautions HTN, a-fib, pacemaker        Manuals 11/29                       Pt education POC, importance of walking regularly at home, goals of PT               Neuro Re-Ed         STS        HKM        Side stepping        Hurdles         Bwd ambu                        Ther Ex        HEP review Side step x2 laps    Stand march x10 ea                                                               Ther Activity                        Gait Training                        Modalities

## 2023-11-30 DIAGNOSIS — L85.3 XEROSIS CUTIS: ICD-10-CM

## 2023-12-01 ENCOUNTER — OFFICE VISIT (OUTPATIENT)
Dept: PHYSICAL THERAPY | Facility: CLINIC | Age: 83
End: 2023-12-01
Payer: MEDICARE

## 2023-12-01 DIAGNOSIS — Z86.73 HISTORY OF CVA (CEREBROVASCULAR ACCIDENT): Primary | ICD-10-CM

## 2023-12-01 PROCEDURE — 97116 GAIT TRAINING THERAPY: CPT

## 2023-12-01 PROCEDURE — 97112 NEUROMUSCULAR REEDUCATION: CPT

## 2023-12-01 RX ORDER — AMMONIUM LACTATE 12 G/100G
CREAM TOPICAL AS NEEDED
Qty: 385 G | Refills: 1 | Status: SHIPPED | OUTPATIENT
Start: 2023-12-01

## 2023-12-01 NOTE — PROGRESS NOTES
Daily Note     Today's date: 2023  Patient name: Yudelka Sanchez  : 1940  MRN: [id number]  Referring provider: Abdiaziz Delgado MD  Dx:   Encounter Diagnosis     ICD-10-CM    1. History of CVA (cerebrovascular accident)  Z86.73                      Subjective: Tonya Beck states that she was a bit tired after IE. Doing well today. Objective: See treatment diary below      Assessment: Tonya Beck tolerated treatment well. Treadmill was introduced with fair tolerance with reported UE and LE fatigue. At a fast speed, she demonstrated more prominent L circumduction of LE advancement. Remainder of session was completed in solo harness with R SPC to decrease UE support. She demonstrated proper sequencing with SPC with minimal imbalance observed. She had more difficulty clearing small hurdles when her L LE was trailing. With backwards ambulation, she was unable to achieve any true L hip extension, some times unable to even achieve neutral hip position. She would benefit from continued skilled PT. Plan: Continue per plan of care. Progress treatment as tolerated. Short Term Goal Expiration Date:(2023)  Long Term Goal Expiration Date: (2024)  POC Expiration Date: (2024)    Visit count: 2 of 10; PN due 2023      POC expires Unit limit Auth Expiration date PT/OT/ST + Visit Limit?    2024 BOMN 24 BOMN                             Visit/Unit Tracking  AUTH Status:  Date             12 visits Used 1 IE 2            23 to 24 Remaining  11 10                     Precautions HTN, a-fib, pacemaker        Manuals                       Pt education POC, importance of walking regularly at home, goals of PT               Neuro Re-Ed         STS  With BUE use  x10      HKM        Side stepping  Solo 1/4 length  With R SPC    X3 laps      Hurdles   Solo /4 length  With R SPC    Over 3 canes    X3 laps (alt lead LE by length)      Bwd ambu  Solo  length  With R SPC    X3 laps                      Ther Ex        HEP review Side step x2 laps    Stand march x10 ea                                                               Ther Activity                        Gait Training        TM  Solo TM, BUE    1.0 mph x5 min    Seated rest due to UE and LE fatigue    1.2 mph x3 min      AD training  Solo with R SPC    X3 laps      Modalities

## 2023-12-04 ENCOUNTER — OFFICE VISIT (OUTPATIENT)
Dept: PHYSICAL THERAPY | Facility: CLINIC | Age: 83
End: 2023-12-04
Payer: MEDICARE

## 2023-12-04 DIAGNOSIS — Z86.73 HISTORY OF CVA (CEREBROVASCULAR ACCIDENT): Primary | ICD-10-CM

## 2023-12-04 PROCEDURE — 97150 GROUP THERAPEUTIC PROCEDURES: CPT

## 2023-12-04 PROCEDURE — 97112 NEUROMUSCULAR REEDUCATION: CPT

## 2023-12-04 PROCEDURE — 97116 GAIT TRAINING THERAPY: CPT

## 2023-12-04 NOTE — PROGRESS NOTES
Daily Note     Today's date: 2023  Patient name: Jeannine Hammond  : 1940  MRN: [id number]  Referring provider: Rafat Nolasco MD  Dx:   Encounter Diagnosis     ICD-10-CM    1. History of CVA (cerebrovascular accident)  Z86.73                    1 on 1 treatment: 0845 to 0915 = 30 min  Group treatment: 0915 to 0925 = 10 min      Subjective: Lewis Koroma states that she felt tired after last session. Doing well this morning. Objective: See treatment diary below      Assessment: Lewis Koroma tolerated treatment well. L ankle weight was added throughout session to increase muscular work load as well as increase proprioceptive input. She demonstrated comparable L hip circumduction while on treadmill with progression of both speed and ankle weight. She was more challenged stepping over canes today compared to last session with more difficulty with L LE trailing both forward and laterally. This was similarly seen with stepping up onto foam. With repetition, this improved some. She would benefit from continued skilled PT. Plan: Continue per plan of care. Progress treatment as tolerated. Short Term Goal Expiration Date:(2023)  Long Term Goal Expiration Date: (2024)  POC Expiration Date: (2024)    Visit count: 3 of 10; PN due 2023      POC expires Unit limit Auth Expiration date PT/OT/ST + Visit Limit?    2024 BOMN 24 BOMN                             Visit/Unit Tracking  AUTH Status:  Date            12 visits Used 1 IE 2 3           23 to 24 Remaining  11 10 9                    Precautions HTN, a-fib, pacemaker        Manuals                      Pt education POC, importance of walking regularly at home, goals of PT               Neuro Re-Ed         STS  With BUE use  x10      HKM        Side stepping  Solo 1/4 length  With R SPC    X3 laps      Hurdles   Solo 1/4 length  With R SPC    Over 3 canes    X3 laps (alt lead LE by length) Solo 1/4 length  With R SPC  2# L AW    Over 4 canes    X3 laps (alt lead LE by length)    X2 laps      Bwd ambu  Solo 1/4 length  With R SPC    X3 laps Solo 1/4 length  With R SPC  2# L AW    X2 laps     Foam   Solo 1/4 length  With R SPC  2# L AW    Stepping up and over airex pads (3)    Fwd x3 laps (alt lead LE by length)             Ther Ex        HEP review Side step x2 laps    Stand march x10 ea                                                               Ther Activity                        Gait Training        TM  Solo TM, BUE    1.0 mph x5 min    Seated rest due to UE and LE fatigue    1.2 mph x3 min Solo TM, BUE  2# L AW    1.0 mph x3 min  1.2 mph x3 min    Seated rest due to LE fatigue    1.4 mph x3 min     AD training  Solo with R SPC    X3 laps      Modalities

## 2023-12-08 ENCOUNTER — OFFICE VISIT (OUTPATIENT)
Dept: PHYSICAL THERAPY | Facility: CLINIC | Age: 83
End: 2023-12-08
Payer: MEDICARE

## 2023-12-08 DIAGNOSIS — Z86.73 HISTORY OF CVA (CEREBROVASCULAR ACCIDENT): Primary | ICD-10-CM

## 2023-12-08 PROCEDURE — 97112 NEUROMUSCULAR REEDUCATION: CPT

## 2023-12-08 PROCEDURE — 97116 GAIT TRAINING THERAPY: CPT

## 2023-12-08 NOTE — PROGRESS NOTES
Daily Note     Today's date: 2023  Patient name: Yudelka Sanchez  : 1940  MRN: [id number]  Referring provider: Abdiaziz Delgado MD  Dx:   Encounter Diagnosis     ICD-10-CM    1. History of CVA (cerebrovascular accident)  Z86.73                      Subjective: Tonya Bcek has no new issues to report. Objective: See treatment diary below      Assessment: Tonya Beck tolerated treatment well with progression of ankle weight for increased muscle work load and proprioceptive input. She had less instances of L toe catching while stepping over canes today. While on treadmill, she had intermittent L toe drag with good independent correction; however, difficulty maintaining correction. She was challenged with step taps with good improvement with repetition during second set. She would benefit from continued skilled PT to progress balance, mobility, and safety with appropriate AD. Plan: Continue per plan of care. Progress treatment as tolerated. Short Term Goal Expiration Date:(2023)  Long Term Goal Expiration Date: (2024)  POC Expiration Date: (2024)    Visit count: 4 of 10; PN due 2023      POC expires Unit limit Auth Expiration date PT/OT/ST + Visit Limit?    2024 BOMN 24 BOMN                             Visit/Unit Tracking  AUTH Status:  Date           12 visits Used 1 IE 2 3 4          23 to 24 Remaining  11 10 9 8                   Precautions HTN, a-fib, pacemaker        Manuals                     Pt education POC, importance of walking regularly at home, goals of PT               Neuro Re-Ed         STS  With BUE use  x10  With BUE use  x10    HKM        Side stepping  Solo 1/4 length  With R SPC    X3 laps  Solo 1/4 length  With R SPC  3# L AW    Blaze pods (4), random     Tap with L    2 min rd x2        Hurdles   Solo 1/4 length  With R SPC    Over 3 canes    X3 laps (alt lead LE by length) Solo 1/4 length  With R SPC  2# L AW    Over 4 canes    X3 laps (alt lead LE by length)    X2 laps  Solo 1/4 length  With R SPC  3# L Aw    Over 4 canes    X3 laps (alt lead LE by length)    Bwd ambu  Solo 1/4 length  With R SPC    X3 laps Solo 1/4 length  With R SPC  2# L AW    X2 laps     Foam   Solo 1/4 length  With R SPC  2# L AW    Stepping up and over airex pads (3)    Fwd x3 laps (alt lead LE by length)     Step taps     Solo 1/4 length  R SPC  3# L AW    Up to 4" step  2x10 ea LE    Ther Ex        HEP review Side step x2 laps    Stand march x10 ea                                                               Ther Activity                        Gait Training        TM  Solo TM, BUE    1.0 mph x5 min    Seated rest due to UE and LE fatigue    1.2 mph x3 min Solo TM, BUE  2# L AW    1.0 mph x3 min  1.2 mph x3 min    Seated rest due to LE fatigue    1.4 mph x3 min Solo TM, BUE  3# L AW    1.2 mph x4 min    Pt needed to go to bathroom (5 min)    1.2 mph x6 min    AD training  Solo with R SPC    X3 laps      Modalities

## 2023-12-11 ENCOUNTER — PATIENT OUTREACH (OUTPATIENT)
Dept: INTERNAL MEDICINE CLINIC | Facility: CLINIC | Age: 83
End: 2023-12-11

## 2023-12-11 NOTE — PROGRESS NOTES
SW has returned call to pt via  ID # [id number] @ [phone]. She shatres that [employer] has given her 21 more hours so her care has increased to 84 hours /week. She now has some one to help her Monday 9 AM to 11 PM and Tuesday  - Friday from 10 AM to 11 PM she has some one 10 AM to 11 PM .  Pt hopes to get additional help in the future for the evenings. Sw reviewed more next year.

## 2023-12-13 ENCOUNTER — OFFICE VISIT (OUTPATIENT)
Dept: PHYSICAL THERAPY | Facility: CLINIC | Age: 83
End: 2023-12-13
Payer: MEDICARE

## 2023-12-13 DIAGNOSIS — Z86.73 HISTORY OF CVA (CEREBROVASCULAR ACCIDENT): Primary | ICD-10-CM

## 2023-12-13 PROCEDURE — 97112 NEUROMUSCULAR REEDUCATION: CPT

## 2023-12-13 PROCEDURE — 97110 THERAPEUTIC EXERCISES: CPT

## 2023-12-13 NOTE — PROGRESS NOTES
Daily Note     Today's date: 2023  Patient name: Mal Motta  : 1940  MRN: [id number]  Referring provider: Wilian Henson MD  Dx:   Encounter Diagnosis     ICD-10-CM    1. History of CVA (cerebrovascular accident)  Z80.79           Start Time: 1019          Subjective: Arrived to session a few minutes late - accommodated. Notes that she is doing pretty well! Feels that she has been having some good days and bad days. Notes that her swelling on her LLE has been getting better and that has been helping. Ambulatory with rollator. Objective: See treatment diary below      Assessment: Tolerated treatment well. Doing well with tradmill bouts, does have some intermittent toe catching towards the later minutes of the bout but is able to recover well - continues to require BUE for treadmill training. Still requires some cuing to not pull up from rollator when standing but to rather push up from the chair that she is sitting in. Some clearance issues throughout session but minimal despite 3# LAW. Trialed some error augmentation resisted ambulation with RLE with emphasis to use more LLE which patient did well with - showing much stronger and increased step length on the R side. Improved step length post session good carry over. Patient demonstrated fatigue post treatment, exhibited good technique with therapeutic exercises, and would benefit from continued PT. Plan: Continue per plan of care. Progress treatment as tolerated. Short Term Goal Expiration Date:(2023)  Long Term Goal Expiration Date: (2024)  POC Expiration Date: (2024)    Visit count: 4 of 10; PN due 2023      POC expires Unit limit Auth Expiration date PT/OT/ST + Visit Limit?    2024 BOMN 24 BOMN                             Visit/Unit Tracking  AUTH Status:  Date           12 visits Used 1 IE 2 3 4          23 to 24 Remaining  11 10 9 8 Precautions HTN, a-fib, pacemaker        Manuals 11/29 12/1 12/4 12/8 12/13                   Pt education POC, importance of walking regularly at home, goals of PT               Neuro Re-Ed         STS  With BUE use  x10  With BUE use  x10 BUE use x10    HKM        Side stepping  Solo 1/4 length  With R SPC    X3 laps  Solo 1/4 length  With R SPC  3# L AW    Blaze pods (4), random     Tap with L    2 min rd x2     Solo 1/4 length with R SPC and 3# LAW     3x laps    Hurdles   Solo 1/4 length  With R SPC    Over 3 canes    X3 laps (alt lead LE by length) Solo 1/4 length  With R SPC  2# L AW    Over 4 canes    X3 laps (alt lead LE by length)    X2 laps  Solo 1/4 length  With R SPC  3# L Aw    Over 4 canes    X3 laps (alt lead LE by length) SOLO R SPC, 4 canes     X3 laps step to pattern    Bwd ambu  Solo 1/4 length  With R SPC    X3 laps Solo 1/4 length  With R SPC  2# L AW    X2 laps  Shuttle 3 cones R SPC 3# AW   1x end of session    Foam   Solo 1/4 length  With R SPC  2# L AW    Stepping up and over airex pads (3)    Fwd x3 laps (alt lead LE by length)     Step taps     Solo 1/4 length  R SPC  3# L AW    Up to 4" step  2x10 ea LE    Ther Ex        HEP review Side step x2 laps    Stand march x10 ea                                                               Ther Activity                        Gait Training        TM  Solo TM, BUE    1.0 mph x5 min    Seated rest due to UE and LE fatigue    1.2 mph x3 min Solo TM, BUE  2# L AW    1.0 mph x3 min  1.2 mph x3 min    Seated rest due to LE fatigue    1.4 mph x3 min Solo TM, BUE  3# L AW    1.2 mph x4 min    Pt needed to go to bathroom (5 min)    1.2 mph x6 min SOLO TM BUE , 3# LAW 1.2 x5 min     Seated rest     1.2 mph x5 mins   AD training  Solo with R SPC    X3 laps      Modalities

## 2023-12-15 ENCOUNTER — OFFICE VISIT (OUTPATIENT)
Dept: PHYSICAL THERAPY | Facility: CLINIC | Age: 83
End: 2023-12-15
Payer: MEDICARE

## 2023-12-15 DIAGNOSIS — Z86.73 HISTORY OF CVA (CEREBROVASCULAR ACCIDENT): Primary | ICD-10-CM

## 2023-12-15 PROCEDURE — 97116 GAIT TRAINING THERAPY: CPT

## 2023-12-15 PROCEDURE — 97112 NEUROMUSCULAR REEDUCATION: CPT

## 2023-12-15 NOTE — PROGRESS NOTES
Daily Note     Today's date: 12/15/2023  Patient name: Jc Emerson  : 1940  MRN: [id number]  Referring provider: Zita Aquino MD  Dx:   Encounter Diagnosis     ICD-10-CM    1. History of CVA (cerebrovascular accident)  Z86.73                      Subjective: Velma Katia states that she is doing ok today. No new issues to report. Objective: See treatment diary below      Assessment: Velma Katia tolerated treatment well. Minor incline was introduced to treadmill without any noticeable change to L toe drag or circumduction observed. Minimal between session carry over of step length following last sessions error augmentation. This was repeated today with noted increased L stance time during exercise. She continues to demonstrate more difficulty with  L foot clearance when it is the trailing leg; however, improved self correction with less instances of toe fully catching today compared to previous sessions. She would benefit from continued skilled PT. Plan: Continue per plan of care. Progress treatment as tolerated. Short Term Goal Expiration Date:(2023)  Long Term Goal Expiration Date: (2024)  POC Expiration Date: (2024)        POC expires Unit limit Auth Expiration date PT/OT/ST + Visit Limit?    2024 BOMN 24 BOMN                             Visit/Unit Tracking  AUTH Status:  Date 11/29 12/1 12/4 12/8 12/13 12/15        12 visits Used 1 IE 2 3 4 5 6        23 to 24 Remaining  11 10 9 8 7 6                 Precautions HTN, a-fib, pacemaker        Manuals 12/15 12/1 12/4 12/8 12/13                   Pt education                Neuro Re-Ed         STS  With BUE use  x10  With BUE use  x10 BUE use x10    HKM        Side stepping  Solo 1/4 length  With R SPC    X3 laps  Solo 1/4 length  With R SPC  3# L AW    Blaze pods (4), random     Tap with L    2 min rd x2     Solo 1/4 length with R SPC and 3# LAW     3x laps    Hurdles   Solo 1/4 length  With R SPC    Over 3 canes    X3 laps (alt lead LE by length) Solo 1/4 length  With R SPC  2# L AW    Over 4 canes    X3 laps (alt lead LE by length)    X2 laps  Solo 1/4 length  With R SPC  3# L Aw    Over 4 canes    X3 laps (alt lead LE by length) SOLO R SPC, 4 canes     X3 laps step to pattern    Bwd ambu  Solo 1/4 length  With R SPC    X3 laps Solo 1/4 length  With R SPC  2# L AW    X2 laps  Shuttle 3 cones R SPC 3# AW   1x end of session    Foam Solo 1/4 length  With R SPC  3# L AW    Stepping up and over airex pads (3)    Fwd x3 laps (alt lead LE by length)  Solo 1/4 length  With R SPC  2# L AW    Stepping up and over airex pads (3)    Fwd x3 laps (alt lead LE by length)     Step taps  Solo 1/4 length  R SPC  3# L AW    Up to 4" step  X15 ea LE (alt)   Solo 1/4 length  R SPC  3# L AW    Up to 4" step  2x10 ea LE    Weave thru cones Solo 1/4 length  R SPC  3# L AW    X3 laps       Resisted ambu Solo 1/4 length  R SPC  3# L AW    Double pink band at R thigh    X3 laps    To increase L stance time       Ther Ex        HEP review                                                                Ther Activity                        Gait Training        TM Solo TM, BUE  3# L AW    1.2 mph x5 min    Seated rest due to LE fatigue and increased toe drag    1.2 mph 3% incline x5 min Solo TM, BUE    1.0 mph x5 min    Seated rest due to UE and LE fatigue    1.2 mph x3 min Solo TM, BUE  2# L AW    1.0 mph x3 min  1.2 mph x3 min    Seated rest due to LE fatigue    1.4 mph x3 min Solo TM, BUE  3# L AW    1.2 mph x4 min    Pt needed to go to bathroom (5 min)    1.2 mph x6 min SOLO TM BUE , 3# LAW 1.2 x5 min     Seated rest     1.2 mph x5 mins   AD training  Solo with R SPC    X3 laps      Modalities

## 2023-12-18 ENCOUNTER — OFFICE VISIT (OUTPATIENT)
Dept: PHYSICAL THERAPY | Facility: CLINIC | Age: 83
End: 2023-12-18
Payer: MEDICARE

## 2023-12-18 DIAGNOSIS — Z86.73 HISTORY OF CVA (CEREBROVASCULAR ACCIDENT): Primary | ICD-10-CM

## 2023-12-18 PROCEDURE — 97116 GAIT TRAINING THERAPY: CPT

## 2023-12-18 PROCEDURE — 97112 NEUROMUSCULAR REEDUCATION: CPT

## 2023-12-18 PROCEDURE — 97530 THERAPEUTIC ACTIVITIES: CPT

## 2023-12-18 NOTE — PROGRESS NOTES
Daily Note     Today's date: 2023  Patient name: Ade Moon  : 1940  MRN: [id number]  Referring provider: Deepak Hermosillo MD  Dx:   Encounter Diagnosis     ICD-10-CM    1. History of CVA (cerebrovascular accident)  Z86.73                      Subjective: Ade is doing ok today. She needs to make adjustments to the schedule due to change in caregiver schedule.      Objective: See treatment diary below      Assessment: Ade tolerated treatment well. Her duration on treadmill was increased during 1st interval. She was able to ambulate at faster speed with more frequent instances of L toe drag with good ability to temporarily correct. Her L foot clearance onto steps and increased jim height was improved from previous sessions with less corrective stepping needed. She was challenged with stepping in various angles, particularly in any backwards motion with the L. Bed mobility was completed today given her previous report of difficulty getting in and out of bed. No A was needed for L LE management today. Performed supine to sit via various methods including through long sit and through sidelying with no significant difference observed in ease of performance. With STS, she tends to keep L foot far in front with improved form and ease of transition following cues. She would benefit from continued skilled PT.      Plan: Continue per plan of care.  Progress treatment as tolerated.       Short Term Goal Expiration Date:(2023)  Long Term Goal Expiration Date: (2024)  POC Expiration Date: (2024)        POC expires Unit limit Auth Expiration date PT/OT/ST + Visit Limit?   2024 BOMN 24 BOMN                             Visit/Unit Tracking  AUTH Status:  Date 11/29 12/1 12/4 12/8 12/13 12/15 12/18       12 visits Used 1 IE 2 3 4 5 6 7       23 to 24 Remaining  11 10 9 8 7 6 5                Precautions HTN, a-fib, pacemaker        Manuals 12/15 12/18 12/4 12/8 12/13      "              Pt education                Neuro Re-Ed         STS  X10 from low mat table    Vc for L foot placement  With BUE use  x10 BUE use x10    HKM        Side stepping    Solo 1/4 length  With R SPC  3# L AW    Blaze pods (4), random     Tap with L    2 min rd x2     Solo 1/4 length with R SPC and 3# LAW     3x laps    Hurdles   Solo 1/4 length  With R SPC  3# L AW    Over 4 pool noodles     X4 laps (alt lead LE by length) Solo 1/4 length  With R SPC  2# L AW    Over 4 canes    X3 laps (alt lead LE by length)    X2 laps  Solo 1/4 length  With R SPC  3# L Aw    Over 4 canes    X3 laps (alt lead LE by length) SOLO R SPC, 4 canes     X3 laps step to pattern    Bwd ambu   Solo 1/4 length  With R SPC  2# L AW    X2 laps  Shuttle 3 cones R SPC 3# AW   1x end of session    Foam Solo 1/4 length  With R SPC  3# L AW    Stepping up and over airex pads (3)    Fwd x3 laps (alt lead LE by length)  Solo 1/4 length  With R SPC  2# L AW    Stepping up and over airex pads (3)    Fwd x3 laps (alt lead LE by length)     Step taps  Solo 1/4 length  R SPC  3# L AW    Up to 4\" step  X15 ea LE (alt) Solo   R SPC  3# L AW    Hexagon ladder    Tap 1 foot in color told x15 reps  Solo 1/4 length  R SPC  3# L AW    Up to 4\" step  2x10 ea LE    Weave thru cones Solo 1/4 length  R SPC  3# L AW    X3 laps       Resisted ambu Solo 1/4 length  R SPC  3# L AW    Double pink band at R thigh    X3 laps    To increase L stance time       Step ups  Solo 1/4 length  3# L AW    Trial stairs with BUE use    Fwd x6 rds (alt lead LE)    Lat x6 rds      Ther Ex        HEP review                                                                Ther Activity        Bed mobility  Sit to supine    X5 rds thru long sit    X5 rds thru sidelying              Gait Training        TM Solo TM, BUE  3# L AW    1.2 mph x5 min    Seated rest due to LE fatigue and increased toe drag    1.2 mph 3% incline x5 min Solo TM, RADHAE  3# L AW    1.2 mph x4 min  1.2 mph 3% " incline x4 min    Seated rest due to LE fatigue and increased L toe drag    1.4 mph x4 min Solo TM, BUE  2# L AW    1.0 mph x3 min  1.2 mph x3 min    Seated rest due to LE fatigue    1.4 mph x3 min Solo TM, BUE  3# L AW    1.2 mph x4 min    Pt needed to go to bathroom (5 min)    1.2 mph x6 min SOLO TM BUE , 3# LAW 1.2 x5 min     Seated rest     1.2 mph x5 mins   AD training        Modalities

## 2023-12-20 ENCOUNTER — OFFICE VISIT (OUTPATIENT)
Dept: PHYSICAL THERAPY | Facility: CLINIC | Age: 83
End: 2023-12-20
Payer: MEDICARE

## 2023-12-20 DIAGNOSIS — Z86.73 HISTORY OF CVA (CEREBROVASCULAR ACCIDENT): Primary | ICD-10-CM

## 2023-12-20 PROCEDURE — 97112 NEUROMUSCULAR REEDUCATION: CPT

## 2023-12-20 PROCEDURE — 97116 GAIT TRAINING THERAPY: CPT

## 2023-12-20 NOTE — PROGRESS NOTES
"Daily Note     Today's date: 2023  Patient name: Ade Moon  : 1940  MRN: [id number]  Referring provider: Deepak Hermosillo MD  Dx:   Encounter Diagnosis     ICD-10-CM    1. History of CVA (cerebrovascular accident)  Z86.73                      Subjective: Ade states that she is doing ok today. No new issues reported.      Objective: See treatment diary below      Assessment: Ade tolerated treatment well. To encourage more weight bearing and improved positioning on L LE during STS, her R foot was placed on a small step with more symmetrical foot placement observed. She continues to be challenged with L toe clearance when trailing LE over pool noodles with less instances of catching noted today. At times, she demonstrates a small UE flexion synergy when increasing L foot clearance. As she fatigued on treadmill at end of session, more frequent L toe catching was exhibited. She would benefit from continued skilled PT.      Plan: Continue per plan of care.  Progress treatment as tolerated.       Short Term Goal Expiration Date:(2023)  Long Term Goal Expiration Date: (2024)  POC Expiration Date: (2024)        POC expires Unit limit Auth Expiration date PT/OT/ST + Visit Limit?   2024 BOMN 24 BOMN                             Visit/Unit Tracking  AUTH Status:  Date 11/29 12/1 12/4 12/8 12/13 12/15 12/18 12/20      12 visits Used 1 IE 2 3 4 5 6 7 8      23 to 24 Remaining  11 10 9 8 7 6 5 4               Precautions HTN, a-fib, pacemaker        Manuals 12/15 12/18 12/20 12/8 12/13                   Pt education                Neuro Re-Ed         STS  X10 from low mat table    Vc for L foot placement From chair with BUE    R foot on 2\" step to increase L weight bearing    2x10 With BUE use  x10 BUE use x10    HKM        Side stepping   Solo 1/4 length  R SPC  3# L AW    X3 laps Solo 1/4 length  With R SPC  3# L AW    Blaze pods (4), random     Tap with L    2 min rd " "x2     Solo 1/4 length with R SPC and 3# LAW     3x laps    Hurdles   Solo 1/4 length  With R SPC  3# L AW    Over 4 pool noodles     X4 laps (alt lead LE by length) Solo 1/4 length  With R SPC  3# L AW    Over 4 pool noodles    X4 laps (alt lead LE by length) Solo 1/4 length  With R SPC  3# L Aw    Over 4 canes    X3 laps (alt lead LE by length) SOLO R SPC, 4 canes     X3 laps step to pattern    Bwd ambu     Shuttle 3 cones R SPC 3# AW   1x end of session    Foam Solo 1/4 length  With R SPC  3# L AW    Stepping up and over airex pads (3)    Fwd x3 laps (alt lead LE by length)       Step taps  Solo 1/4 length  R SPC  3# L AW    Up to 4\" step  X15 ea LE (alt) Solo   R SPC  3# L AW    Hexagon ladder    Tap 1 foot in color told x15 reps  Solo 1/4 length  R SPC  3# L AW    Up to 4\" step  2x10 ea LE    Weave thru cones Solo 1/4 length  R SPC  3# L AW    X3 laps       Resisted ambu Solo 1/4 length  R SPC  3# L AW    Double pink band at R thigh    X3 laps    To increase L stance time       Step ups  Solo 1/4 length  3# L AW    Trial stairs with BUE use    Fwd x6 rds (alt lead LE)    Lat x6 rds Solo 1/4 length  With R SPC  3# L AW    Stepping up and over: airex pad, 4\" step, airex pad    Fwd x4 laps (alt lead LE by length)     Ther Ex        HEP review                                                                Ther Activity        Bed mobility  Sit to supine    X5 rds thru long sit    X5 rds thru sidelying              Gait Training        TM Solo TM, BUE  3# L AW    1.2 mph x5 min    Seated rest due to LE fatigue and increased toe drag    1.2 mph 3% incline x5 min Solo TM, BUE  3# L AW    1.2 mph x4 min  1.2 mph 3% incline x4 min    Seated rest due to LE fatigue and increased L toe drag    1.4 mph x4 min Solo TM, BUE  3# L AW    1.4 mph x6 min    Seated rest due to LE fatigue and increased L toe drag    1.4 mph x5 min Solo TM, BUE  3# L AW    1.2 mph x4 min    Pt needed to go to bathroom (5 min)    1.2 mph x6 min SOLO " BRIAN DENT , 3# LAW 1.2 x5 min     Seated rest     1.2 mph x5 mins   AD training        Modalities

## 2023-12-22 ENCOUNTER — CLINICAL SUPPORT (OUTPATIENT)
Dept: INTERNAL MEDICINE CLINIC | Facility: CLINIC | Age: 83
End: 2023-12-22

## 2023-12-22 DIAGNOSIS — E53.8 B12 DEFICIENCY: Primary | Chronic | ICD-10-CM

## 2023-12-22 PROCEDURE — 96372 THER/PROPH/DIAG INJ SC/IM: CPT

## 2023-12-22 RX ADMIN — CYANOCOBALAMIN 1000 MCG: 1000 INJECTION, SOLUTION INTRAMUSCULAR; SUBCUTANEOUS at 09:00

## 2023-12-22 NOTE — PROGRESS NOTES
Patient came into the office for Vit B12 injection. Patient provided serum and 1.0 ml was given in right deltoid.

## 2023-12-23 DIAGNOSIS — E53.8 B12 DEFICIENCY: Chronic | ICD-10-CM

## 2023-12-23 DIAGNOSIS — I48.20 CHRONIC ATRIAL FIBRILLATION (HCC): ICD-10-CM

## 2023-12-26 RX ORDER — CYANOCOBALAMIN 1000 UG/ML
1000 INJECTION, SOLUTION INTRAMUSCULAR; SUBCUTANEOUS
Qty: 3 ML | Refills: 0 | Status: SHIPPED | OUTPATIENT
Start: 2023-12-26 | End: 2024-03-25

## 2023-12-26 RX ORDER — ATENOLOL 100 MG/1
TABLET ORAL
Qty: 180 CAPSULE | Refills: 3 | Status: SHIPPED | OUTPATIENT
Start: 2023-12-26

## 2023-12-27 ENCOUNTER — OFFICE VISIT (OUTPATIENT)
Dept: PHYSICAL THERAPY | Facility: CLINIC | Age: 83
End: 2023-12-27
Payer: MEDICARE

## 2023-12-27 DIAGNOSIS — Z86.73 HISTORY OF CVA (CEREBROVASCULAR ACCIDENT): Primary | ICD-10-CM

## 2023-12-27 PROCEDURE — 97116 GAIT TRAINING THERAPY: CPT

## 2023-12-27 PROCEDURE — 97112 NEUROMUSCULAR REEDUCATION: CPT

## 2023-12-27 PROCEDURE — 97530 THERAPEUTIC ACTIVITIES: CPT

## 2023-12-27 NOTE — PROGRESS NOTES
"Daily Note     Today's date: 2023  Patient name: Ade Moon  : 1940  MRN: [id number]  Referring provider: Deepak Hermosillo MD  Dx:   Encounter Diagnosis     ICD-10-CM    1. History of CVA (cerebrovascular accident)  Z86.73                      Subjective: Ade states that she had a nice [place].      Objective: See treatment diary below      Assessment: Ade tolerated treatment well. She demonstrated more difficulty with L foot clearance throughout today's session. This was noted by difficulty tapping the blaze pods as well as increased frequency of L toe drag on treadmill. She did have 1 LOB into harness system while side stepping, needing min A from therapist to fully recover. Despite increased LE fatigue, she was able to complete all bed mobility with no more than supervision. Progress note due next session.      Plan: Progress note during next visit.      Short Term Goal Expiration Date:(2023)  Long Term Goal Expiration Date: (2024)  POC Expiration Date: (2024)        POC expires Unit limit Auth Expiration date PT/OT/ST + Visit Limit?   2024 BOMN 24 BOMN                             Visit/Unit Tracking  AUTH Status:  Date 11/29 12/1 12/4 12/8 12/13 12/15 12/18 12/20 12/27     12 visits Used 1 IE 2 3 4 5 6 7 8 9     23 to 24 Remaining  11 10 9 8 7 6 5 4 3              Precautions HTN, a-fib, pacemaker        Manuals 12/15 12/18 12/20 12/27 12/13                   Pt education                Neuro Re-Ed         STS  X10 from low mat table    Vc for L foot placement From chair with BUE    R foot on 2\" step to increase L weight bearing    2x10 From chair with BUE     R foot on 2\" step to increase L weight bearing    2x10 BUE use x10    HKM        Side stepping   Solo 1/4 length  R SPC  3# L AW    X3 laps Solo 1/4 length  R SPC  3# L AW    Blaze pods (4)    1 min x3 rds Solo 1/4 length with R SPC and 3# LAW     3x laps    Hurdles   Solo 1/4 length  With R " "SPC  3# L AW    Over 4 pool noodles     X4 laps (alt lead LE by length) Solo 1/4 length  With R SPC  3# L AW    Over 4 pool noodles    X4 laps (alt lead LE by length)  SOLO R SPC, 4 canes     X3 laps step to pattern    Bwd ambu    Solo 1/4 length  R SPC  3# L AW    Blaze pods (4)    Fwd/bwd 1 min x2 rds Shuttle 3 cones R SPC 3# AW   1x end of session    Foam Solo 1/4 length  With R SPC  3# L AW    Stepping up and over airex pads (3)    Fwd x3 laps (alt lead LE by length)       Step taps  Solo 1/4 length  R SPC  3# L AW    Up to 4\" step  X15 ea LE (alt) Solo   R SPC  3# L AW    Hexagon ladder    Tap 1 foot in color told x15 reps      Weave thru cones Solo 1/4 length  R SPC  3# L AW    X3 laps       Resisted ambu Solo 1/4 length  R SPC  3# L AW    Double pink band at R thigh    X3 laps    To increase L stance time       Step ups  Solo 1/4 length  3# L AW    Trial stairs with BUE use    Fwd x6 rds (alt lead LE)    Lat x6 rds Solo 1/4 length  With R SPC  3# L AW    Stepping up and over: airex pad, 4\" step, airex pad    Fwd x4 laps (alt lead LE by length)     Ther Ex        HEP review                                                                Ther Activity        Bed mobility  Sit to supine    X5 rds thru long sit    X5 rds thru sidelying  Rolling x5 ea side    Sit to supine  X5 reps    Cs for all            Gait Training        TM Solo TM, BUE  3# L AW    1.2 mph x5 min    Seated rest due to LE fatigue and increased toe drag    1.2 mph 3% incline x5 min Solo TM, BUE  3# L AW    1.2 mph x4 min  1.2 mph 3% incline x4 min    Seated rest due to LE fatigue and increased L toe drag    1.4 mph x4 min Solo TM, BUE  3# L AW    1.4 mph x6 min    Seated rest due to LE fatigue and increased L toe drag    1.4 mph x5 min Solo TM, BUE  3# L AW    1.2 mph 3% incline x5 min    Seated rest due to LE fatigue and increased L toe drag    1.2 mph 3% incline x5 min SOLO TM BUE , 3# LAW 1.2 x5 min     Seated rest     1.2 mph x5 mins   AD " training        Modalities

## 2024-01-02 ENCOUNTER — EVALUATION (OUTPATIENT)
Dept: PHYSICAL THERAPY | Facility: CLINIC | Age: 84
End: 2024-01-02
Payer: MEDICARE

## 2024-01-02 DIAGNOSIS — Z86.73 HISTORY OF CVA (CEREBROVASCULAR ACCIDENT): Primary | ICD-10-CM

## 2024-01-02 PROCEDURE — 97110 THERAPEUTIC EXERCISES: CPT

## 2024-01-02 PROCEDURE — 97112 NEUROMUSCULAR REEDUCATION: CPT

## 2024-01-02 PROCEDURE — 97150 GROUP THERAPEUTIC PROCEDURES: CPT

## 2024-01-02 NOTE — PROGRESS NOTES
PT Progress Note     Today's date: 2024  Patient name: Ade Moon  : 1940  MRN: [id number]  Referring provider: Deepak Hermosillo MD  Dx:   Encounter Diagnosis     ICD-10-CM    1. History of CVA (cerebrovascular accident)  Z86.73                    1 on 1 treatment: 1056 to 1130 = 34 min  Group treatment: 1130 to 1140    Subjective: Ade states that her walking is better. She is still using her rollator for all mobility. At times, she trips up on her L toes, but finds this is happening less often. Denies any falls. She has to be extra cautious when walking outside given the uneven sidewalks and not wanting to trip up on it. She finds that she has an easier time getting in and out of bed, not needing as regular help. She continues to have pain in her L toes as they are curled under and she is using some cream and stretching it.     Patient Goals  Patient goals for therapy: increased strength and improved balance  Patient goal: easier get in and out bed    Pain  Current pain ratin  Location: L foot      Objective: See treatment diary below      Balance Test  IE  PN 24   6 Minute Walk Test (ft):  532 ft with rollator (HR 94, SpO2 98%)  720 ft with rollator   Functional Gait Assessment:  NT  NT   Gait Speed (m/s):  10m/21.0s = 0.48 m/s with rollator  10m/15.6s = 0.64 m/s with rollaor   10m/24.4s = 0.41 m/s with SPC   5x Sit To Stand (s):  19.9 (BUE use, asymmetrical foot placement, knees pushed back in chair)  16.4 (BUE use; minor foot asymmetry; no knees pushing back into the chair)   TU.9 with rollator  20.1 with rollator   24.1 with SPC   ABC Scale:  13.75%  15.63%        Bed mobility:   Sit to supine: min A at L LE; PN close supervision  Rolling: independent to L, min A to R; PN close supervision  Supine to sit: close supervision (per pt varies at home); PN close supervision      Assessment: Ade was initially referred to outpatient physical therapy secondary to ongoing balance  concerns following CVA on 8/12/2022. She has attended a total of 10 PT sessions and progress note was completed today. She demonstrated good improvements in all impairment regions. Her endurance via 6 MWT significantly increased, nearing a 200 ft increase. Her walking speed with the rollator significantly improved, more clearly classifying her as a limited community ambulator. Her TUG time improved, exceeding MDC of test; however, continues to classify her as an increased falls risk. A few outcome measures were performed with SPC to begin assessing safety and comparisaion with appropriate AD. Continue to recommend rollator use as her primary AD for safety. She continues to have intermittent L toe drag with near LOB with good independent recovery throughout. At this point, she has met most STGs for PT and making good progress towards LTGs. She would benefit from continued skilled PT along initial POC.    Goals  STGs in 4 weeks  1. Pt will improve gait speed by at least 0.1 m/s with least restrictive device needed to improve safety with ambulation. - MET (with rollator)  2. Pt will improve TUG time by at least 5 seconds to decrease fall risk and improve mobility. - MET  3. Pt will improve 5xSTS score by at least 5 seconds needed to reduce fall risk. - NOT MET  4. Pt will improve 6MWT by at least 100 feet with appropriate AD to improve functional mobility and endurance. - MET  5. Pt will be able to complete all bed mobility with no more than close supervision for improved independence. - MET     LTGs in 12 weeks  1. Pt will improve gait speed to at least 0.68 m/s with least restrictive device needed to improve safety with ambulation. - PROGRESSING  2. Pt will improve TUG score to <18 seconds to decrease fall risk and improve functional mobility. - PROGRESSING  3. Pt will improve 5xSTS score to <15 sec needed to reduce fall risk.  4. Pt will improve 6MWT to at least 750 feet with appropriate AD to improve functional  "mobility. - PROGRESSING  5. Pt will demonstrate independence with HEP within 12 weeks. - PROGRESSING      Plan: Continue per plan of care.  Progress treatment as tolerated.      Planned therapy interventions: balance, neuromuscular re-education, patient education, orthotic management and training, strengthening, stretching, therapeutic activities, therapeutic exercise, gait training, graded activity, home exercise program and coordination    Frequency: 2x week  Duration in weeks: 8    Plan of Care beginning date: 11/29/2023  Plan of Care expiration date: 2/29/2024     Short Term Goal Expiration Date:(12/29/2023)  Long Term Goal Expiration Date: (2/29/2024)  POC Expiration Date: (2/29/2024)        POC expires Unit limit Auth Expiration date PT/OT/ST + Visit Limit?   2/29/2024 BOMN 2/16/24 BOMN                             Visit/Unit Tracking  AUTH Status:  Date 11/29 12/1 12/4 12/8 12/13 12/15 12/18 12/20 12/27 1/2/24    12 visits Used 1 IE 2 3 4 5 6 7 8 9 10 PN    11/29/23 to 2/16/24 Remaining  11 10 9 8 7 6 5 4 3 2             Precautions HTN, a-fib, pacemaker        Manuals 12/15 12/18 12/20 12/27 1/2/24                   Pt education     Functional outcome measures           Neuro Re-Ed         STS  X10 from low mat table    Vc for L foot placement From chair with BUE    R foot on 2\" step to increase L weight bearing    2x10 From chair with BUE     R foot on 2\" step to increase L weight bearing    2x10    HKM        Side stepping   Solo 1/4 length  R SPC  3# L AW    X3 laps Solo 1/4 length  R SPC  3# L AW    Blaze pods (4)    1 min x3 rds    Hurdles   Solo 1/4 length  With R SPC  3# L AW    Over 4 pool noodles     X4 laps (alt lead LE by length) Solo 1/4 length  With R SPC  3# L AW    Over 4 pool noodles    X4 laps (alt lead LE by length)     Bwd ambu    Solo 1/4 length  R SPC  3# L AW    Blaze pods (4)    Fwd/bwd 1 min x2 rds    Foam Solo 1/4 length  With R SPC  3# L AW    Stepping up and over airex pads " "(3)    Fwd x3 laps (alt lead LE by length)       Step taps  Solo 1/4 length  R SPC  3# L AW    Up to 4\" step  X15 ea LE (alt) Solo   R SPC  3# L AW    Hexagon ladder    Tap 1 foot in color told x15 reps      Weave thru cones Solo 1/4 length  R SPC  3# L AW    X3 laps       Resisted ambu Solo 1/4 length  R SPC  3# L AW    Double pink band at R thigh    X3 laps    To increase L stance time       Step ups  Solo 1/4 length  3# L AW    Trial stairs with BUE use    Fwd x6 rds (alt lead LE)    Lat x6 rds Solo 1/4 length  With R SPC  3# L AW    Stepping up and over: airex pad, 4\" step, airex pad    Fwd x4 laps (alt lead LE by length)     Ther Ex        HEP review                                                                Ther Activity        Bed mobility  Sit to supine    X5 rds thru long sit    X5 rds thru sidelying  Rolling x5 ea side    Sit to supine  X5 reps    Cs for all            Gait Training        TM Solo TM, BUE  3# L AW    1.2 mph x5 min    Seated rest due to LE fatigue and increased toe drag    1.2 mph 3% incline x5 min Solo TM, BUE  3# L AW    1.2 mph x4 min  1.2 mph 3% incline x4 min    Seated rest due to LE fatigue and increased L toe drag    1.4 mph x4 min Solo TM, BUE  3# L AW    1.4 mph x6 min    Seated rest due to LE fatigue and increased L toe drag    1.4 mph x5 min Solo TM, BUE  3# L AW    1.2 mph 3% incline x5 min    Seated rest due to LE fatigue and increased L toe drag    1.2 mph 3% incline x5 min Solo TM, BUE use    No AW    1.2 mph x8 min   AD training        Modalities                                                       "

## 2024-01-03 ENCOUNTER — APPOINTMENT (OUTPATIENT)
Dept: PHYSICAL THERAPY | Facility: CLINIC | Age: 84
End: 2024-01-03
Payer: MEDICARE

## 2024-01-05 ENCOUNTER — OFFICE VISIT (OUTPATIENT)
Dept: PHYSICAL THERAPY | Facility: CLINIC | Age: 84
End: 2024-01-05
Payer: MEDICARE

## 2024-01-05 ENCOUNTER — TELEPHONE (OUTPATIENT)
Dept: INTERNAL MEDICINE CLINIC | Facility: CLINIC | Age: 84
End: 2024-01-05

## 2024-01-05 DIAGNOSIS — Z86.73 HISTORY OF CVA (CEREBROVASCULAR ACCIDENT): Primary | ICD-10-CM

## 2024-01-05 PROCEDURE — 97112 NEUROMUSCULAR REEDUCATION: CPT

## 2024-01-05 PROCEDURE — 97150 GROUP THERAPEUTIC PROCEDURES: CPT

## 2024-01-05 PROCEDURE — 97116 GAIT TRAINING THERAPY: CPT

## 2024-01-05 NOTE — PROGRESS NOTES
"Daily Note     Today's date: 2024  Patient name: Ade Moon  : 1940  MRN: [id number]  Referring provider: Deepak Hermosillo MD  Dx:   Encounter Diagnosis     ICD-10-CM    1. History of CVA (cerebrovascular accident)  Z86.73                    1 on 1 treatment: 1030 to 1100 = 30 min  Group treatment: 1100 to 1110 = 10 min    Subjective: Ade arrived 15 mins late due to traffic and was accommodated as able.      Objective: See treatment diary below      Assessment: Ade tolerated treatment well. She had minimal instances of L toe drag on the treadmill today. She had more difficulty with L foot clearance while stepping up onto foam and a small step, particularly when it was the trailing leg and as fatigue set in. She was challenged achieving any hip extension with backwards walking. Her step length also reduced when head movements were introduced. No LOB into solo with good ability to use ankle and hip strategies to maintain her balance throughout session. She would benefit from continued skilled PT.      Plan: Continue per plan of care.  Progress treatment as tolerated.       Short Term Goal Expiration Date:(2023)  Long Term Goal Expiration Date: (2024)  POC Expiration Date: (2024)        POC expires Unit limit Auth Expiration date PT/OT/ST + Visit Limit?   2024 BOMN 24 BOMN                             Visit/Unit Tracking  AUTH Status:  Date 11/29 12/1 12/4 12/8 12/13 12/15 12/18 12/20 12/27 1/2/24 1/5   12 visits Used 1 IE 2 3 4 5 6 7 8 9 10 PN 11   23 to 24 Remaining  11 10 9 8 7 6 5 4 3 2 1            Precautions HTN, a-fib, pacemaker        Manuals 24                   Pt education     Functional outcome measures           Neuro Re-Ed         STS  X10 from low mat table    Vc for L foot placement From chair with BUE    R foot on 2\" step to increase L weight bearing    2x10 From chair with BUE     R foot on 2\" step to increase L " "weight bearing    2x10    HKM        Side stepping   Solo 1/4 length  R SPC  3# L AW    X3 laps Solo 1/4 length  R SPC  3# L AW    Blaze pods (4)    1 min x3 rds    Hurdles   Solo 1/4 length  With R SPC  3# L AW    Over 4 pool noodles     X4 laps (alt lead LE by length) Solo 1/4 length  With R SPC  3# L AW    Over 4 pool noodles    X4 laps (alt lead LE by length)     Bwd ambu Solo 1/4 length  R SPC  3# L AW    X3 laps   Solo 1/4 length  R SPC  3# L AW    Blaze pods (4)    Fwd/bwd 1 min x2 rds    Foam        Step taps   Solo   R SPC  3# L AW    Hexagon ladder    Tap 1 foot in color told x15 reps      Weave thru cones        Resisted ambu        Step ups Solo 1/4 length  With R SPC   3# L AW    Stepping up and over: airex pad, 4\" step, airex pad    Fwd x4 laps (alt lead LE by length) Solo 1/4 length  3# L AW    Trial stairs with BUE use    Fwd x6 rds (alt lead LE)    Lat x6 rds Solo 1/4 length  With R SPC  3# L AW    Stepping up and over: airex pad, 4\" step, airex pad    Fwd x4 laps (alt lead LE by length)     Walk with HT/HN Solo 1/4 wlgnth  With R SPC  3# L AW    X3 laps ea       Ther Ex        HEP review                                                                Ther Activity        Bed mobility  Sit to supine    X5 rds thru long sit    X5 rds thru sidelying  Rolling x5 ea side    Sit to supine  X5 reps    Cs for all            Gait Training        TM Solo TM, BUE use    3# L AW    1.2 mph x4 min   1.2 mph 3% incline x6 min    Total 10 mins Solo TM, BUE  3# L AW    1.2 mph x4 min  1.2 mph 3% incline x4 min    Seated rest due to LE fatigue and increased L toe drag    1.4 mph x4 min Solo TM, BUE  3# L AW    1.4 mph x6 min    Seated rest due to LE fatigue and increased L toe drag    1.4 mph x5 min Solo TM, BUE  3# L AW    1.2 mph 3% incline x5 min    Seated rest due to LE fatigue and increased L toe drag    1.2 mph 3% incline x5 min Solo TM, BUE use    No AW    1.2 mph x8 min   AD training        Modalities      "

## 2024-01-08 ENCOUNTER — OFFICE VISIT (OUTPATIENT)
Dept: PHYSICAL THERAPY | Facility: CLINIC | Age: 84
End: 2024-01-08
Payer: MEDICARE

## 2024-01-08 ENCOUNTER — OFFICE VISIT (OUTPATIENT)
Dept: MULTI SPECIALTY CLINIC | Facility: CLINIC | Age: 84
End: 2024-01-08

## 2024-01-08 VITALS
BODY MASS INDEX: 25.33 KG/M2 | DIASTOLIC BLOOD PRESSURE: 71 MMHG | SYSTOLIC BLOOD PRESSURE: 115 MMHG | WEIGHT: 143 LBS | HEART RATE: 87 BPM

## 2024-01-08 DIAGNOSIS — M21.372 FOOT DROP, LEFT FOOT: Primary | ICD-10-CM

## 2024-01-08 DIAGNOSIS — B35.1 ONYCHOMYCOSIS: ICD-10-CM

## 2024-01-08 DIAGNOSIS — Z86.73 HISTORY OF CVA (CEREBROVASCULAR ACCIDENT): Primary | ICD-10-CM

## 2024-01-08 PROCEDURE — 97112 NEUROMUSCULAR REEDUCATION: CPT

## 2024-01-08 NOTE — PROGRESS NOTES
[facility] Podiatry - Clinic Visit  Ade Moon 83 y.o. female MRN: [id number]  Encounter: [id number]    ASSESSMENT:       Diagnoses and all orders for this visit:    Foot drop, left foot    Onychomycosis       PLAN:    Patient was examined, evaluated, and treated with all questions and concerns addressed  Performed sharp manual debridement of toenails x 10. See procedure note below  Advised patient to continue to use AFO brace from [facility] and supportive shoes as well as a walker to assist with ambulation  Encouraged daily at home foot checks.  RTC in 3 months.     - Dr. Russell  was present for entirety of patient encounter.        Nail debridement     Date/Time  1/8/2024 3:40 PM     Performed by  Bridget Cruz DPM   Authorized by  Bridget Cruz DPM     Universal Protocol   Consent: Verbal consent obtained.  Risks and benefits: risks, benefits and alternatives were discussed  Consent given by: patient  Patient identity confirmed: verbally with patient     Procedure Details   Procedure Notes: Sharp manual debridement of toenails x 10 using a large nail nipper to reduce thickness, length, and removal subungual debris    Patient tolerance: patient tolerated the procedure well with no immediate complications               SUBJECTIVE:    History of Present Illness     Ade Moon is a 83 y.o. female who presents with elongated toenails x 10. She states that since her stroke she has been unable to cut her toenails due to their thickness and her inability to bend over and cut them. She states that she has also had a left foot drop for years - the foot is painful at times, and she uses an AFO brace on very sporadic occasions. Using a walker does help with ambulation.    Patient denies N/V/F/chills/SOB/CP.     Review of Systems   Constitutional: Negative.    HENT: Negative.    Eyes: Negative.    Respiratory: Negative.    Cardiovascular: Negative.    Gastrointestinal: Negative.    Musculoskeletal:  Negative  Skin: elongated, thickened toenails x 10  Neurological: Negative        Historical Information   Past Medical History:   Diagnosis Date    A-fib (HCC)     Anemia     Arthritis     Breast pain, right     Chest pain on breathing     Colon polyp     Cough     Diverticulosis     Encounter for screening colonoscopy     H/O sick sinus syndrome     Heart murmur     High cholesterol     History of atrial fibrillation     Rate ontrolled. On xarelto 15mg (creatinine 50) Followed by Dr. Randolph with Cardiology     History of weight loss     Report loose fitting clothes. Weight stable (3lbs difference). Last colo showed small tubular adenoma x2. Breast biopsy last showed fibrocystic changes. TSH wnl. Will check cbc, bmp, spep.        Hx of long term use of blood thinners     Hypertension     Irregular heart beat     RICH (obstructive sleep apnea)     Pacemaker     Preop examination     Shortness of breath     Viral bronchitis      Past Surgical History:   Procedure Laterality Date    BREAST BIOPSY Right 08/17/2006    ultrasound guided Percutaneous Needle Core -Benign    CATARACT EXTRACTION      CATARACT EXTRACTION W/ INTRAOCULAR LENS  IMPLANT, BILATERAL      COLONOSCOPY      COLONOSCOPY N/A 05/22/2018    Procedure: COLONOSCOPY;  Surgeon: Jenn Jang DO;  [facility];  Service: Gastroenterology    INSERT / REPLACE / REMOVE PACEMAKER      LEG SURGERY Right     as a child after a fall    MAMMO STEREOTACTIC BREAST BIOPSY RIGHT (ALL INC) Right 10/2014    benign    KS CMBND ANTERPOST COLPORRAPHY W/CYSTO N/A 03/17/2016    Procedure: COLPORRHAPHY ANTERIOR POSTERIOR ;  Surgeon: Dario Braden MD;  [facility];  Service: Gynecology    KS COLONOSCOPY FLX DX W/COLLJ SPEC WHEN PFRMD N/A 04/04/2019    Procedure: COLONOSCOPY;  Surgeon: Jenn Jang DO;  [facility];  Service: Gastroenterology    KS COLPOPEXY VAGINAL EXTRAPERITONEAL APPROACH N/A 03/17/2016    Procedure: COLPOPEXY VAGINAL  EXTRAPERITONEAL (VEC) ANTERIOR ;  Surgeon: Dario Braden MD;  [facility];  Service: Gynecology    HI CYSTOURETHROSCOPY N/A 03/17/2016    Procedure: CYSTOSCOPY;  Surgeon: Dario Braden MD;  [facility];  Service: Gynecology    HI ESOPHAGOGASTRODUODENOSCOPY TRANSORAL DIAGNOSTIC N/A 04/16/2018    Procedure: EGD AND COLONOSCOPY;  Surgeon: Jenn Jagn DO;  [facility];  Service: Gastroenterology    HI LAPAROSCOPY COLECTOMY PARTIAL W/ANASTOMOSIS Right 01/13/2022    Procedure: Diagnostic laparoscopy, laparscopic right colectomy;  Surgeon: Andriy Guevara MD;  [facility];  Service: Colorectal    HI SLING OPERATION STRESS INCONTINENCE N/A 03/17/2016    Procedure: INSERTION PUBOVAGINAL SLING SINGLE INCISION ;  Surgeon: Dario Braden MD;  [facility];  Service: Gynecology     Social History   Social History     Substance and Sexual Activity   Alcohol Use Never     Social History     Substance and Sexual Activity   Drug Use No     Social History     Tobacco Use   Smoking Status Never   Smokeless Tobacco Never     Family History:   Family History   Problem Relation Age of Onset    Diabetes Mother     Breast cancer Sister 53    No Known Problems Father     No Known Problems Maternal Grandmother     No Known Problems Maternal Grandfather     No Known Problems Paternal Grandmother     No Known Problems Paternal Grandfather     No Known Problems Daughter     No Known Problems Son     No Known Problems Sister     No Known Problems Sister     No Known Problems Brother     No Known Problems Brother     No Known Problems Sister     No Known Problems Paternal Aunt     No Known Problems Paternal Aunt     No Known Problems Paternal Aunt     No Known Problems Paternal Aunt        Meds/Allergies   (Not in a hospital admission)    Allergies   Allergen Reactions    Other Itching     Bandaids    Tape [Medical Tape] Itching         OBJECTIVE:    Current Vitals:   Blood  "Pressure: 115/71 (01/08/24 1521)  Pulse: 87 (01/08/24 1521)  Weight - Scale: 64.9 kg (143 lb) (01/08/24 1521)    /71 (BP Location: Left arm, Patient Position: Sitting, Cuff Size: Large)   Pulse 87   Wt 64.9 kg (143 lb)   BMI 25.33 kg/m²       Lower Extremity Exam:    Physical Exam:    Musculoskeletal:  MMT is 4/5 to all compartments to RLE, MMT is 3/5 to left anterior compartment, MMT is 4/5 to left lateral and posterior compartment. Hallux ROM is < 65 degrees B/L, Ankle dorsiflexion < 10 degrees from neutral with leg extended B/L, no pain or guarding during passive joint ROM. Active ROM to the digits intact. Noted foot drop to LLE with hammertoe deformity of all left lesser digits     Cardiovascular:  Right DP pulse 2/4, Right PT pulse 2/4, Left DP pulse 2/4, Left PT pulse 2/4, +0/4 edema B/L, CFT< 3 sec to digits. Pedal hair is Absent. Skin temperature warm to warm B/L. No calf tenderness.     Dermatological:  No open Lesions. Skin of the LE is normal temperature, texture, turgor. Elongated, thickened, discolored toenails x 10       Neurological:  AAOx3. Gross sensation is intact. Monofilament sensation is diminished. Protective sensation is Intact.             Imaging: I have personally reviewed pertinent films in PACS  EKG, Pathology, and Other Studies: I have personally reviewed pertinent reports.      ** Please Note: Portions of the record may have been created with voice recognition software. Occasional wrong word or \"sound a like\" substitutions may have occurred due to the inherent limitations of voice recognition software. Read the chart carefully and recognize, using context, where substitutions have occurred. **    "

## 2024-01-08 NOTE — PROGRESS NOTES
"Daily Note     Today's date: 2024  Patient name: Ade Moon  : 1940  MRN: [id number]  Referring provider: Deepak Hermosillo MD  Dx:   Encounter Diagnosis     ICD-10-CM    1. History of CVA (cerebrovascular accident)  Z86.73                      Subjective: Ade states that she did not do much over the weekend due to the snow.       Objective: See treatment diary below      Assessment: Ade tolerated treatment well. She had increased ease of STS with R LE elevated; however, as she fatigued, she needed cues to achieve full upright stance as she had a tendency to remain flexed in stance. She was challenged with L foot clearance throughout majority of session, likely related to overall fatigue. Her clearance was notably less when her L LE was trailing. She had difficulty maintaining step through pattern as she was weaving through cones. Towards the end of session, she reported not feeling good, very tired and there her leg was too heavy for her. BP was assessed and WNL of 130/66 mmHg. She admitted to not eating yet this morning and encouraged her to eat prior to attending PT. She verbalized understanding. She would benefit from continued skilled PT.      Plan: Continue per plan of care.  Progress treatment as tolerated.       Short Term Goal Expiration Date:(2023)  Long Term Goal Expiration Date: (2024)  POC Expiration Date: (2024)        POC expires Unit limit Auth Expiration date PT/OT/ST + Visit Limit?   2024 BOMN 24 BOMN                             Visit/Unit Tracking  AUTH Status:  Date 1/8 12/1 12/4 12/8 12/13 12/15 12/18 12/20 12/27 1/2/24 1/5   12 visits Used 12 2 3 4 5 6 7 8 9 10 PN 11   23 to 24 Remaining  0 10 9 8 7 6 5 4 3 2 1            Precautions HTN, a-fib, pacemaker        Manuals 24                   Pt education     Functional outcome measures           Neuro Re-Ed         STS  From chair with BUE     R foot on 2\" step to " "increase L weight bearing    2x10 From chair with BUE    R foot on 2\" step to increase L weight bearing    2x10 From chair with BUE     R foot on 2\" step to increase L weight bearing    2x10    HKM        Side stepping  Solo 1/4 length  R SPC  3# L AW    X4 laps    Vc for L foot clearance Solo 1/4 length  R SPC  3# L AW    X3 laps Solo 1/4 length  R SPC  3# L AW    Blaze pods (4)    1 min x3 rds    Hurdles   Solo 1/4 length  With R SPC  3# L AW    Over 5 pool noodles     X4 laps (alt lead LE by length) Solo 1/4 length  With R SPC  3# L AW    Over 4 pool noodles    X4 laps (alt lead LE by length)     Bwd ambu Solo 1/4 length  R SPC  3# L AW    X3 laps   Solo 1/4 length  R SPC  3# L AW    Blaze pods (4)    Fwd/bwd 1 min x2 rds    Foam        Step taps         Weave thru cones  Solo 1/4 length  With R SPC  3# L AW      Resisted ambu        Step ups Solo 1/4 length  With R SPC   3# L AW    Stepping up and over: airex pad, 4\" step, airex pad    Fwd x4 laps (alt lead LE by length)  Solo 1/4 length  With R SPC  3# L AW    Stepping up and over: airex pad, 4\" step, airex pad    Fwd x4 laps (alt lead LE by length)     Walk with HT/HN Solo 1/4 wlgnth  With R SPC  3# L AW    X3 laps ea       Ther Ex        HEP review                                                                Ther Activity        Bed mobility    Rolling x5 ea side    Sit to supine  X5 reps    Cs for all            Gait Training        TM Solo TM, BUE use    3# L AW    1.2 mph x4 min   1.2 mph 3% incline x6 min    Total 10 mins Solo TM, BUE use    3# L AW    1.4 mph x2 min    Stopped due to pt requesting to use bathroom Solo TM, BUE  3# L AW    1.4 mph x6 min    Seated rest due to LE fatigue and increased L toe drag    1.4 mph x5 min Solo TM, BUE  3# L AW    1.2 mph 3% incline x5 min    Seated rest due to LE fatigue and increased L toe drag    1.2 mph 3% incline x5 min Solo TM, BUE use    No AW    1.2 mph x8 min   AD training        Modalities                 "

## 2024-01-09 DIAGNOSIS — E78.5 HYPERLIPIDEMIA LDL GOAL <100: ICD-10-CM

## 2024-01-09 RX ORDER — ROSUVASTATIN CALCIUM 5 MG/1
5 TABLET, COATED ORAL DAILY
Qty: 90 TABLET | Refills: 3 | Status: SHIPPED | OUTPATIENT
Start: 2024-01-09

## 2024-01-10 ENCOUNTER — APPOINTMENT (OUTPATIENT)
Dept: PHYSICAL THERAPY | Facility: CLINIC | Age: 84
End: 2024-01-10
Payer: MEDICARE

## 2024-01-10 DIAGNOSIS — Z86.73 HISTORY OF CVA (CEREBROVASCULAR ACCIDENT): Primary | ICD-10-CM

## 2024-01-10 NOTE — PROGRESS NOTES
"Daily Note     Today's date: 1/10/2024  Patient name: Ade Moon  : 1940  MRN: [id number]  Referring provider: Deepak Hermosillo MD  Dx:   Encounter Diagnosis     ICD-10-CM    1. History of CVA (cerebrovascular accident)  Z86.73                    1 on 1 treatment: 1145 to 1215 = 30 min***  Group treatment: 1215 to 1225 = 10 min ***      Subjective: ***      Objective: See treatment diary below      Assessment: Tolerated treatment {Tolerated treatment :[id number]}. Patient {assessment:[id number]}      Plan: Continue per plan of care.  Progress treatment as tolerated.       Short Term Goal Expiration Date:(2023)  Long Term Goal Expiration Date: (2024)  POC Expiration Date: (2024)        POC expires Unit limit Auth Expiration date PT/OT/ST + Visit Limit?   2024 BOMN 24 BOMN     24                        Visit/Unit Tracking  AUTH Status:  Date 1/8 1/10*** 12/4 12/8 12/13 12/15 12/18 12/20 12/27 1/2/24 1/5   8 visits Used 12 1  3 4 5 6 7 8 9 10 PN 11   1/10 to  Remaining  0 7 9 8 7 6 5 4 3 2 1            Precautions HTN, a-fib, pacemaker        Manuals 1/5 1/8 1/10 12/27 1/2/24                   Pt education     Functional outcome measures           Neuro Re-Ed         STS  From chair with BUE     R foot on 2\" step to increase L weight bearing    2x10 *** From chair with BUE     R foot on 2\" step to increase L weight bearing    2x10    HKM        Side stepping  Solo 1/4 length  R SPC  3# L AW    X4 laps    Vc for L foot clearance *** Solo 1/4 length  R SPC  3# L AW    Blaze pods (4)    1 min x3 rds    Hurdles   Solo 1/4 length  With R SPC  3# L AW    Over 5 pool noodles     X4 laps (alt lead LE by length) ***     Bwd ambu Solo 1/4 length  R SPC  3# L AW    X3 laps  *** Solo 1/4 length  R SPC  3# L AW    Blaze pods (4)    Fwd/bwd 1 min x2 rds    Foam        Step taps         Weave thru cones  Solo 1/4 length  With R SPC  3# L AW      Resisted ambu        Step ups Solo 1 " "length  With R SPC   3# L AW    Stepping up and over: airex pad, 4\" step, airex pad    Fwd x4 laps (alt lead LE by length)  ***     Walk with HT/HN Solo 1/4 wlgnth  With R SPC  3# L AW    X3 laps ea       Ther Ex        HEP review                                                                Ther Activity        Bed mobility    Rolling x5 ea side    Sit to supine  X5 reps    Cs for all            Gait Training        TM Solo TM, BUE use    3# L AW    1.2 mph x4 min   1.2 mph 3% incline x6 min    Total 10 mins Solo TM, BUE use    3# L AW    1.4 mph x2 min    Stopped due to pt requesting to use bathroom *** Solo TM, BUE  3# L AW    1.2 mph 3% incline x5 min    Seated rest due to LE fatigue and increased L toe drag    1.2 mph 3% incline x5 min Solo TM, BUE use    No AW    1.2 mph x8 min   AD training        Modalities                                                             "

## 2024-01-12 ENCOUNTER — OFFICE VISIT (OUTPATIENT)
Dept: PHYSICAL THERAPY | Facility: CLINIC | Age: 84
End: 2024-01-12
Payer: MEDICARE

## 2024-01-12 DIAGNOSIS — Z86.73 HISTORY OF CVA (CEREBROVASCULAR ACCIDENT): Primary | ICD-10-CM

## 2024-01-12 PROCEDURE — 97116 GAIT TRAINING THERAPY: CPT

## 2024-01-12 PROCEDURE — 97112 NEUROMUSCULAR REEDUCATION: CPT

## 2024-01-12 NOTE — PROGRESS NOTES
"Daily Note     Today's date: 2024  Patient name: Ade Moon  : 1940  MRN: [id number]  Referring provider: Deepak Hermosillo MD  Dx:   Encounter Diagnosis     ICD-10-CM    1. History of CVA (cerebrovascular accident)  Z86.73                      Subjective: Ade states that she is feeling better today than she did on Monday.       Objective: See treatment diary below      Assessment: Ade tolerated treatment well. She was able to complete full duration on treadmill with good step length and rare L toe drag noted. She had improved L foot clearance in order to tap blaze pods as well as improved weight shift onto L side when tapping with R. She continues to be challenged achieving full step to even hip neutral with backwards ambulation. She had a few instances of L toe catching when her L LE was trailing to navigate foam pads, with 1 instance of min A to recover and successfully complete step up. She exhibited good fatigue by end of session. She would benefit from continued skilled PT.      Plan: Continue per plan of care.  Progress treatment as tolerated.       Short Term Goal Expiration Date:(2023)  Long Term Goal Expiration Date: (2024)  POC Expiration Date: (2024)        POC expires Unit limit Auth Expiration date PT/OT/ST + Visit Limit?   2024 BOMN 24 BOMN     24                        Visit/Unit Tracking  AUTH Status:  Date 1/8 1/12 12/4 12/8 12/13 12/15 12/18 12/20 12/27 1/2/24 1   8 visits Used 12 1  3 4 5 6 7 8 9 10 PN 11   1/10 to  Remaining  0 7 9 8 7 6 5 4 3 2 1            Precautions HTN, a-fib, pacemaker        Manuals 24                   Pt education     Functional outcome measures           Neuro Re-Ed         STS  From chair with BUE     R foot on 2\" step to increase L weight bearing    2x10 From chair with BUE     R foot on 2\" step to increase L weight bearing    Vc for full upright From chair with BUE     R foot on 2\" " "step to increase L weight bearing    2x10    HKM        Side stepping  Solo 1/4 length  R SPC  3# L AW    X4 laps    Vc for L foot clearance Solo 1/4 length  R SPC  3# L AW    Blaze pods (4)    10 hits x2 rds Solo 1/4 length  R SPC  3# L AW    Blaze pods (4)    1 min x3 rds    Hurdles   Solo 1/4 length  With R SPC  3# L AW    Over 5 pool noodles     X4 laps (alt lead LE by length)      Bwd ambu Solo 1/4 length  R SPC  3# L AW    X3 laps  Solo 1/4 length  R SPC  3# L AW    X3 laps Solo 1/4 length  R SPC  3# L AW    Blaze pods (4)    Fwd/bwd 1 min x2 rds    Foam   Solo 1/4 length  R SPC  3# L AW    Stepping up and over airex pads (3)     Fwd x3 laps (alt lead LE by length)     Step taps         Weave thru cones  Solo 1/4 length  With R SPC  3# L AW      Resisted ambu        Step ups Solo 1/4 length  With R SPC   3# L AW    Stepping up and over: airex pad, 4\" step, airex pad    Fwd x4 laps (alt lead LE by length)       Walk with HT/HN Solo 1/4 wlgnth  With R SPC  3# L AW    X3 laps ea       Ther Ex        HEP review                                                                Ther Activity        Bed mobility    Rolling x5 ea side    Sit to supine  X5 reps    Cs for all            Gait Training        TM Solo TM, BUE use    3# L AW    1.2 mph x4 min   1.2 mph 3% incline x6 min    Total 10 mins Solo TM, BUE use    3# L AW    1.4 mph x2 min    Stopped due to pt requesting to use bathroom Solo TM BUE use    3# L AW    1.2 mph x3 min  1.2 mph 3% incline x7 min    Total 10 mins Solo TM, BUE  3# L AW    1.2 mph 3% incline x5 min    Seated rest due to LE fatigue and increased L toe drag    1.2 mph 3% incline x5 min Solo TM, BUE use    No AW    1.2 mph x8 min   AD training        Modalities                                                             "

## 2024-01-16 ENCOUNTER — APPOINTMENT (OUTPATIENT)
Dept: PHYSICAL THERAPY | Facility: CLINIC | Age: 84
End: 2024-01-16
Payer: MEDICARE

## 2024-01-17 ENCOUNTER — APPOINTMENT (OUTPATIENT)
Dept: PHYSICAL THERAPY | Facility: CLINIC | Age: 84
End: 2024-01-17
Payer: MEDICARE

## 2024-01-18 ENCOUNTER — OFFICE VISIT (OUTPATIENT)
Dept: PHYSICAL THERAPY | Facility: CLINIC | Age: 84
End: 2024-01-18
Payer: MEDICARE

## 2024-01-18 DIAGNOSIS — Z86.73 HISTORY OF CVA (CEREBROVASCULAR ACCIDENT): Primary | ICD-10-CM

## 2024-01-18 PROCEDURE — 97112 NEUROMUSCULAR REEDUCATION: CPT

## 2024-01-18 PROCEDURE — 97150 GROUP THERAPEUTIC PROCEDURES: CPT

## 2024-01-18 NOTE — PROGRESS NOTES
"Daily Note     Today's date: 2024  Patient name: Ade Moon  : 1940  MRN: [id number]  Referring provider: Deeapk Hermosillo MD  Dx:   Encounter Diagnosis     ICD-10-CM    1. History of CVA (cerebrovascular accident)  Z86.73                    1 on 1 treatment: 1200 to 1223 = 23 min  Group treatment: 1223 to 1233 = 10 min  Self guided treatment: 1233 to 1240 = 7 min      Subjective: Ade states that she is doing good. Just too cold outside.       Objective: See treatment diary below      Assessment: Ade tolerated treatment well. Session was completed within parallel bars due to clinic availability. She was allowed bilateral UE use to really focus on L step length throughout. Ho height was increased with good challenge, especially laterally, and minimal circumduction was observed. She did tend to have increased R lateral trunk lean in order to clear her L LE over the ho. She demonstrated increased ankle strategies while on a more continuous compliant surface with reduced step length observed. She had improved backwards step with the L LE today compared to when using SPC. She would benefit from continued skilled PT.      Plan: Continue per plan of care.  Progress treatment as tolerated.       Short Term Goal Expiration Date:(2023)  Long Term Goal Expiration Date: (2024)  POC Expiration Date: (2024)        POC expires Unit limit Auth Expiration date PT/OT/ST + Visit Limit?   2024 BOMN 24 BOMN     24                        Visit/Unit Tracking  AUTH Status:  Date 1/8 1/12 1/18 12/8 12/13 12/15 12/18 12/20 12/27 1/2/24 1/5   8 visits Used 12 1  2 4 5 6 7 8 9 10 PN 11   1/10 to  Remaining  0 7 6 8 7 6 5 4 3 2 1            Precautions HTN, a-fib, pacemaker        Manuals 24                   Pt education     Functional outcome measures           Neuro Re-Ed         STS  From chair with BUE     R foot on 2\" step to increase L weight " "bearing    2x10 From chair with BUE     R foot on 2\" step to increase L weight bearing    Vc for full upright From chair with BUE    X15 reps    HKM        Side stepping  Solo 1/4 length  R SPC  3# L AW    X4 laps    Vc for L foot clearance Solo 1/4 length  R SPC  3# L AW    Blaze pods (4)    10 hits x2 rds     Hurdles   Solo 1/4 length  With R SPC  3# L AW    Over 5 pool noodles     X4 laps (alt lead LE by length)  //bars (long)  BUE use    6\" hurdles (6)    Fwd x4 laps (alt lead LE by length)    Lat x3 laps    Bwd ambu Solo 1/4 length  R SPC  3# L AW    X3 laps  Solo 1/4 length  R SPC  3# L AW    X3 laps //bars (long)  BUE use    X3 laps    Focus on increased L step length    Foam   Solo 1/4 length  R SPC  3# L AW    Stepping up and over airex pads (3)     Fwd x3 laps (alt lead LE by length) //bars (long)  BUE use    Across 5 airex pads pushed together    Fwd x4 laps    Lat x4 laps    Step taps     //bars   U/l UE use    Up to 6\" step   2x15 ea LE     Weave thru cones  Solo 1/4 length  With R SPC  3# L AW      Resisted ambu        Step ups Solo 1/4 length  With R SPC   3# L AW    Stepping up and over: airex pad, 4\" step, airex pad    Fwd x4 laps (alt lead LE by length)       Walk with HT/HN Solo 1/4 wlgnth  With R SPC  3# L AW    X3 laps ea       Ther Ex        HEP review                                                                Ther Activity        Bed mobility                Gait Training        TM Solo TM, BUE use    3# L AW    1.2 mph x4 min   1.2 mph 3% incline x6 min    Total 10 mins Solo TM, BUE use    3# L AW    1.4 mph x2 min    Stopped due to pt requesting to use bathroom Solo TM BUE use    3# L AW    1.2 mph x3 min  1.2 mph 3% incline x7 min    Total 10 mins  Solo TM, BUE use    No AW    1.2 mph x8 min   AD training        Modalities                                                               "

## 2024-01-19 ENCOUNTER — APPOINTMENT (OUTPATIENT)
Dept: PHYSICAL THERAPY | Facility: CLINIC | Age: 84
End: 2024-01-19
Payer: MEDICARE

## 2024-01-22 ENCOUNTER — OFFICE VISIT (OUTPATIENT)
Dept: INTERNAL MEDICINE CLINIC | Facility: CLINIC | Age: 84
End: 2024-01-22

## 2024-01-22 ENCOUNTER — OFFICE VISIT (OUTPATIENT)
Dept: PHYSICAL THERAPY | Facility: CLINIC | Age: 84
End: 2024-01-22
Payer: MEDICARE

## 2024-01-22 DIAGNOSIS — E53.8 VITAMIN B12 DEFICIENCY: Primary | ICD-10-CM

## 2024-01-22 DIAGNOSIS — Z86.73 HISTORY OF CVA (CEREBROVASCULAR ACCIDENT): Primary | ICD-10-CM

## 2024-01-22 DIAGNOSIS — L85.3 XEROSIS CUTIS: ICD-10-CM

## 2024-01-22 PROCEDURE — 97116 GAIT TRAINING THERAPY: CPT

## 2024-01-22 PROCEDURE — 97112 NEUROMUSCULAR REEDUCATION: CPT

## 2024-01-22 PROCEDURE — 96372 THER/PROPH/DIAG INJ SC/IM: CPT

## 2024-01-22 RX ADMIN — CYANOCOBALAMIN 1000 MCG: 1000 INJECTION, SOLUTION INTRAMUSCULAR; SUBCUTANEOUS at 15:58

## 2024-01-22 NOTE — PROGRESS NOTES
Patient came into the office for nurse visit for Vit B12 injection. Patient provided serum and 1.0 ml was given in left deltoid.

## 2024-01-22 NOTE — PROGRESS NOTES
"Daily Note     Today's date: 2024  Patient name: Ade Moon  : 1940  MRN: [id number]  Referring provider: Deepak Hermosillo MD  Dx:   Encounter Diagnosis     ICD-10-CM    1. History of CVA (cerebrovascular accident)  Z86.73                      Subjective: Ade states that she is doing ok today.       Objective: See treatment diary below      Assessment: Ade tolerated treatment fair. She had more difficulty with L foot clearance throughout session, particularly on treadmill. More regular verbal cues were needed for L foot clearance to avoid toe/heel drag. She needed increased cognitive attention and minor recruitment of UE flexion synergy for increased LE clearance. She did have improved weight shift for side stepping without significant foot drag. She demonstrated increased fatigue throughout today's session compared to previous sessions. She would benefit from continued skilled PT to progress as able.      Plan: Continue per plan of care.  Progress treatment as tolerated.       Short Term Goal Expiration Date:(2023)  Long Term Goal Expiration Date: (2024)  POC Expiration Date: (2024)        POC expires Unit limit Auth Expiration date PT/OT/ST + Visit Limit?   2024 BOMN 24 BOMN     24                        Visit/Unit Tracking  AUTH Status:  Date 1/8 1/12 1/18 1/22 12/13 12/15 12/18 12/20 12/27 1/2/24 1/5   8 visits Used 12 1  2 3 5 6 7 8 9 10 PN 11   1/10 to  Remaining  0 7 6 5 7 6 5 4 3 2 1            Precautions HTN, a-fib, pacemaker        Manuals                    Pt education                Neuro Re-Ed         STS  From chair with BUE     R foot on 2\" step to increase L weight bearing    2x10 From chair with BUE     R foot on 2\" step to increase L weight bearing    Vc for full upright From chair with BUE    X15 reps From chair with BUE     X10 reps   HKM     Solo  length  3# L AW  With R SPC    X4 laps   Side stepping  Solo  " "length  R SPC  3# L AW    X4 laps    Vc for L foot clearance Solo 1/4 length  R SPC  3# L AW    Blaze pods (4)    10 hits x2 rds  Solo 1/4 length  3# L AW  With R SPC    X4 laps   Hurdles   Solo 1/4 length  With R SPC  3# L AW    Over 5 pool noodles     X4 laps (alt lead LE by length)  //bars (long)  BUE use    6\" hurdles (6)    Fwd x4 laps (alt lead LE by length)    Lat x3 laps    Bwd ambu Solo 1/4 length  R SPC  3# L AW    X3 laps  Solo 1/4 length  R SPC  3# L AW    X3 laps //bars (long)  BUE use    X3 laps    Focus on increased L step length Solo 1/4 length  3# L AW  With R SPC    X4 laps   Foam   Solo 1/4 length  R SPC  3# L AW    Stepping up and over airex pads (3)     Fwd x3 laps (alt lead LE by length) //bars (long)  BUE use    Across 5 airex pads pushed together    Fwd x4 laps    Lat x4 laps    Step taps     //bars   U/l UE use    Up to 6\" step   2x15 ea LE     Weave thru cones  Solo 1/4 length  With R SPC  3# L AW   Solo 1/4 length  3# L AW  With R SPC    X4 laps   Resisted ambu        Step ups Solo 1/4 length  With R SPC   3# L AW    Stepping up and over: airex pad, 4\" step, airex pad    Fwd x4 laps (alt lead LE by length)       Walk with HT/HN Solo 1/4 wlgnth  With R SPC  3# L AW    X3 laps ea       Ther Ex        HEP review                                                                Ther Activity        Bed mobility                Gait Training        TM Solo TM, BUE use    3# L AW    1.2 mph x4 min   1.2 mph 3% incline x6 min    Total 10 mins Solo TM, BUE use    3# L AW    1.4 mph x2 min    Stopped due to pt requesting to use bathroom Solo TM BUE use    3# L AW    1.2 mph x3 min  1.2 mph 3% incline x7 min    Total 10 mins  Solo TM BUE use    3# L AW    1.2 mph x10 min   AD training        Modalities                                                                 "

## 2024-01-23 ENCOUNTER — HOSPITAL ENCOUNTER (OUTPATIENT)
Dept: SLEEP CENTER | Facility: CLINIC | Age: 84
Discharge: HOME | End: 2024-01-23
Payer: MEDICARE

## 2024-01-23 DIAGNOSIS — G47.33 OSA (OBSTRUCTIVE SLEEP APNEA): ICD-10-CM

## 2024-01-23 PROCEDURE — 95811 POLYSOM 6/>YRS CPAP 4/> PARM: CPT | Performed by: INTERNAL MEDICINE

## 2024-01-23 PROCEDURE — 95811 POLYSOM 6/>YRS CPAP 4/> PARM: CPT

## 2024-01-23 RX ORDER — AMMONIUM LACTATE 12 G/100G
CREAM TOPICAL AS NEEDED
Qty: 385 G | Refills: 1 | Status: SHIPPED | OUTPATIENT
Start: 2024-01-23

## 2024-01-24 NOTE — PROGRESS NOTES
Sleep Study Documentation    Pre-Sleep Study       Sleep testing procedure explained to patient:NO    Patient napped prior to study:NO    Caffeine:Dayshift worker after 12PM.  Caffeine use:NO    Alcohol:Dayshift workers after 5PM: Alcohol use:NO    Typical day for patient:YES       Study Documentation    Sleep Study Indications: RICH-diagnosed in-lab study, RICH     Sleep Study: Treatment   Optimal PAP pressure: 16/12 cmH2O  Leak:Small  Snore:Eliminated  REM Obtained:yes  Supplemental O2: no    Minimum SaO2 77%  Baseline SaO2 94%  PAP mask tried (list all) small Fawad [employer] full face mask and a small [employer] simplus full face mask  PAP mask choice (final) small [employer] simplus full face mask  PAP mask type:full face  PAP pressure at which snoring was eliminated 16/12 cmH2O  Minimum SaO2 at final PAP pressure 95%  Mode of Therapy:CPAP  ETCO2:No  CPAP changed to BiPAP:Yes. If yes why Cpap of 15 cmH2O failed to eliminate osbtructive events    Mode of Therapy:BiPAP    EKG abnormalities: no     EEG abnormalities: no    Were abnormal behaviors in sleep observed:NO    Is Total Sleep Study Recording Time < 2 hours: N/A    Is Total Sleep Study Recording Time > 2 hours but study is incomplete: N/A    Is Total Sleep Study Recording Time 6 hours or more but sleep was not obtained: NO    Patient classification: retired       Post-Sleep Study    Medication used at bedtime or during sleep study:YES prescription sleep aid    Patient reports time it took to fall asleep:20 to 30 minutes    Patient reports waking up during study:1 to 2 times.  Patient reports returning to sleep in greater than 30 minutes.    Patient reports sleeping 4 to 6 hours with dreaming.    Does the Patient feel this is a typical night of sleep:better than usual    Patient rated sleepiness: Somewhat sleepy or tired    PAP treatment:yes: Post PAP treatment patient reports feeling better and  would wear PAP mask at home.

## 2024-01-25 DIAGNOSIS — G47.31 CENTRAL SLEEP APNEA: Primary | ICD-10-CM

## 2024-01-25 NOTE — PROGRESS NOTES
Patient's PAP titration study was interpreted, demonstrating persistent central and obstructive respiratory events despite CPAP and Bilevel PAP pressures used.     Recommend ASV titration study (ordered). Patient's most recent echocardiogram from 8/17/2023 shows LVEF 60%.    Radha Morgan MD

## 2024-01-26 ENCOUNTER — OFFICE VISIT (OUTPATIENT)
Dept: PHYSICAL THERAPY | Facility: CLINIC | Age: 84
End: 2024-01-26
Payer: MEDICARE

## 2024-01-26 DIAGNOSIS — Z86.73 HISTORY OF CVA (CEREBROVASCULAR ACCIDENT): Primary | ICD-10-CM

## 2024-01-26 PROCEDURE — 97112 NEUROMUSCULAR REEDUCATION: CPT

## 2024-01-26 PROCEDURE — 97116 GAIT TRAINING THERAPY: CPT

## 2024-01-26 NOTE — PROGRESS NOTES
"Daily Note     Today's date: 2024  Patient name: Ade Moon  : 1940  MRN: [id number]  Referring provider: Deepak Hermosillo MD  Dx:   Encounter Diagnosis     ICD-10-CM    1. History of CVA (cerebrovascular accident)  Z86.73                      Subjective: Ade states that she is doing good today. No new issues.       Objective: See treatment diary below      Assessment: Ade tolerated treatment well. She continues to have intermittent difficulty with L foot clearance throughout today's session. This was particularly evident when her L LE was the trailing LE over hurdles or steps. She had minimal path deviations with head movements; however, did have increased prominence of L LE drag during. With cues, she was able to improve this. She demonstrated good fatigue by end of session. She would benefit from continued skilled PT to progress her balance and safety.      Plan: Continue per plan of care.  Progress treatment as tolerated.       Short Term Goal Expiration Date:(2023)  Long Term Goal Expiration Date: (2024)  POC Expiration Date: (2024)        POC expires Unit limit Auth Expiration date PT/OT/ST + Visit Limit?   2024 BOMN 24 BOMN     24                        Visit/Unit Tracking  AUTH Status:  Date 1/8 1/12 1/18 1/22 1/26 12/15 12/18 12/20 12/27 1/2/24 1   8 visits Used 12 1  2 3 4 6 7 8 9 10 PN 11   1/10 to  Remaining  0 7 6 5 4 6 5 4 3 2 1            Precautions HTN, a-fib, pacemaker        Manuals                    Pt education                Neuro Re-Ed         STS From chair with BUE    R foot on 2\" step to increase L weight bearing From chair with BUE     R foot on 2\" step to increase L weight bearing    2x10 From chair with BUE     R foot on 2\" step to increase L weight bearing    Vc for full upright From chair with BUE    X15 reps From chair with BUE     X10 reps   HKM     Solo  length  3# L AW  With R SPC    X4 laps " "  Side stepping  Solo 1/4 length  R SPC  3# L AW    X4 laps    Vc for L foot clearance Solo 1/4 length  R SPC  3# L AW    Blaze pods (4)    10 hits x2 rds  Solo 1/4 length  3# L AW  With R SPC    X4 laps   Hurdles  Solo 1/4 length  With R SPC  3# L AW    Over 3 pool noodles     Fwd x4 laps (alt lead LE by length) Solo 1/4 length  With R SPC  3# L AW    Over 5 pool noodles     X4 laps (alt lead LE by length)  //bars (long)  BUE use    6\" hurdles (6)    Fwd x4 laps (alt lead LE by length)    Lat x3 laps    Bwd ambu   Solo 1/4 length  R SPC  3# L AW    X3 laps //bars (long)  BUE use    X3 laps    Focus on increased L step length Solo 1/4 length  3# L AW  With R SPC    X4 laps   Foam   Solo 1/4 length  R SPC  3# L AW    Stepping up and over airex pads (3)     Fwd x3 laps (alt lead LE by length) //bars (long)  BUE use    Across 5 airex pads pushed together    Fwd x4 laps    Lat x4 laps    Step taps     //bars   U/l UE use    Up to 6\" step   2x15 ea LE     Weave thru cones  Solo 1/4 length  With R SPC  3# L AW   Solo 1/4 length  3# L AW  With R SPC    X4 laps   Resisted ambu        Step ups Solo 1/4 length  Trial stairs    BUE use    Fwd x6 rds  Lat x6 rds       Walk with HT/HN Solo 1/4 length  3# L AW    X3 laps ea       Ther Ex        HEP review                                                                Ther Activity        Bed mobility                Gait Training        TM Solo TM BUE use    3# L AW    1.2 mph x5 min  1.4 mph x5 min    Vc for L step height Solo TM, BUE use    3# L AW    1.4 mph x2 min    Stopped due to pt requesting to use bathroom Solo TM BUE use    3# L AW    1.2 mph x3 min  1.2 mph 3% incline x7 min    Total 10 mins  Solo TM BUE use    3# L AW    1.2 mph x10 min   AD training        Modalities                                           "

## 2024-01-29 ENCOUNTER — OFFICE VISIT (OUTPATIENT)
Dept: PHYSICAL THERAPY | Facility: CLINIC | Age: 84
End: 2024-01-29
Payer: MEDICARE

## 2024-01-29 DIAGNOSIS — Z86.73 HISTORY OF CVA (CEREBROVASCULAR ACCIDENT): Primary | ICD-10-CM

## 2024-01-29 PROCEDURE — 97116 GAIT TRAINING THERAPY: CPT

## 2024-01-29 PROCEDURE — 97112 NEUROMUSCULAR REEDUCATION: CPT

## 2024-01-29 NOTE — PROGRESS NOTES
"Daily Note     Today's date: 2024  Patient name: Ade Moon  : 1940  MRN: [id number]  Referring provider: Deepak Hermosillo MD  Dx:   Encounter Diagnosis     ICD-10-CM    1. History of CVA (cerebrovascular accident)  Z86.73                      Subjective: Ade states that she rested over the weekend and feeling good today.       Objective: See treatment diary below      Assessment: Ade tolerated treatment well. She had less instances of L toe drag or heel scuff noted throughout today's session until the very end. She had improved clearance over the pool noodles with only 1-2 instances of toe catching but good ability to adjust step length and height. She was challenged with maintaining L step length/height with backwards walking and while on compliant surfaces. This improved with cues for knee to chest and heel strike. Progress note due next session.      Plan: Progress note during next visit.      Short Term Goal Expiration Date:(2023)  Long Term Goal Expiration Date: (2024)  POC Expiration Date: (2024)        POC expires Unit limit Auth Expiration date PT/OT/ST + Visit Limit?   2024 BOMN 24 BOMN     24                        Visit/Unit Tracking  AUTH Status:  Date 24 1   8 visits Used 12 1  2 3 4 5 7 8 9 10 PN 11   1/10 to  Remaining  0 7 6 5 4 3 5 4 3 2 1            Precautions HTN, a-fib, pacemaker        Manuals                    Pt education                Neuro Re-Ed         STS From chair with BUE    R foot on 2\" step to increase L weight bearing  From chair with BUE     R foot on 2\" step to increase L weight bearing    Vc for full upright From chair with BUE    X15 reps From chair with BUE     X10 reps   HKM     Solo  length  3# L AW  With R SPC    X4 laps   Side stepping   Solo  length  R SPC  3# L AW    Blaze pods (4)    10 hits x2 rds  Solo / length  3# L " "AW  With R SPC    X4 laps   Hurdles  Solo 1/4 length  With R SPC  3# L AW    Over 3 pool noodles     Fwd x4 laps (alt lead LE by length) Solo 1/4 length  With R SPC  3# L AW    Over 4 pool noodles     Fwd x4 laps (alt lead LE by length)  //bars (long)  BUE use    6\" hurdles (6)    Fwd x4 laps (alt lead LE by length)    Lat x3 laps    Bwd ambu  Solo 1/4 length  3# L AW   With R SPC    X4 laps Solo 1/4 length  R SPC  3# L AW    X3 laps //bars (long)  BUE use    X3 laps    Focus on increased L step length Solo 1/4 length  3# L AW  With R SPC    X4 laps   Foam  Solo 1/4 length  R SPC  3# L AW    Across 4 airex pads pushed together    Fwd x3 laps Solo 1/4 length  R SPC  3# L AW    Stepping up and over airex pads (3)     Fwd x3 laps (alt lead LE by length) //bars (long)  BUE use    Across 5 airex pads pushed together    Fwd x4 laps    Lat x4 laps    Step taps     //bars   U/l UE use    Up to 6\" step   2x15 ea LE     Weave thru cones  Solo 1/4 length  3# L AW  With R SPC    X4 laps     Vc for increased L step length   Solo 1/4 length  3# L AW  With R SPC    X4 laps   Resisted ambu        Step ups Solo 1/4 length  Trial stairs    BUE use    Fwd x6 rds  Lat x6 rds       Walk with HT/HN Solo 1/4 length  3# L AW    X3 laps ea       Ther Ex        HEP review                                                                Ther Activity        Bed mobility                Gait Training        TM Solo TM BUE use    3# L AW    1.2 mph x5 min  1.4 mph x5 min    Vc for L step height Solo TM BUE use    3# L Aw    1.2  mph x4 min  1.2 mph 5% incline x6 min    Total 10 min Solo TM BUE use    3# L AW    1.2 mph x3 min  1.2 mph 3% incline x7 min    Total 10 mins  Solo TM BUE use    3# L AW    1.2 mph x10 min   AD training        Modalities                                             "

## 2024-01-31 ENCOUNTER — TELEPHONE (OUTPATIENT)
Dept: SLEEP CENTER | Facility: CLINIC | Age: 84
End: 2024-01-31

## 2024-01-31 NOTE — TELEPHONE ENCOUNTER
Per interpreting physician:  Patient's PAP titration study was interpreted, demonstrating persistent central and obstructive respiratory events despite CPAP and Bilevel PAP pressures used.     Recommend ASV titration study (ordered). Patient's most recent echocardiogram from 8/17/2023 shows LVEF 60%.     Radha Morgan MD    ---------------------------------------------------------    Called patient and advised of sleep study results. Scheduled ASV study 2/4/24 in [place].  Instructions provided.  Verbalized understanding.

## 2024-02-02 ENCOUNTER — EVALUATION (OUTPATIENT)
Dept: PHYSICAL THERAPY | Facility: CLINIC | Age: 84
End: 2024-02-02
Payer: MEDICARE

## 2024-02-02 DIAGNOSIS — Z86.73 HISTORY OF CVA (CEREBROVASCULAR ACCIDENT): Primary | ICD-10-CM

## 2024-02-02 PROCEDURE — 97112 NEUROMUSCULAR REEDUCATION: CPT

## 2024-02-02 PROCEDURE — 97110 THERAPEUTIC EXERCISES: CPT

## 2024-02-02 PROCEDURE — 97116 GAIT TRAINING THERAPY: CPT

## 2024-02-02 NOTE — PROGRESS NOTES
PT Progress Note     Today's date: 2024  Patient name: Ade Moon  : 1940  MRN: [id number]  Referring provider: Deepak Hermosillo MD  Dx:   Encounter Diagnosis     ICD-10-CM    1. History of CVA (cerebrovascular accident)  Z86.73                      Subjective: Ade states that her L leg still gives her problems but does feel like its getting a bit better. Denies any falls. She continues to use a rollator for all mobility. She finds that her balance is still off, having difficulty standing still while her aide helps her get dress. Its better now as she can stand during it and she used to have to sit.       Patient Goals  Patient goals for therapy: increased strength and improved balance  Patient goal: easier get in and out bed     Pain  Current pain ratin  Location: L foot      Objective: See treatment diary below          Balance Test  IE  PN 24 PN 24   6 Minute Walk Test (ft):  532 ft with rollator (HR 94, SpO2 98%)  720 ft with rollator  714 ft with rollator   Functional Gait Assessment:  NT  NT  Score  with SPC   Gait Speed (m/s):  10m/21.0s = 0.48 m/s with rollator  10m/15.6s = 0.64 m/s with rollaor   10m/24.4s = 0.41 m/s with SPC  10m/14.9s = 0.67 m/s with rollator   10m/26.4s = 0.38 m/s with SPC   5x Sit To Stand (s):  19.9 (BUE use, asymmetrical foot placement, knees pushed back in chair)  16.4 (BUE use; minor foot asymmetry; no knees pushing back into the chair)  12.4 (BUE use; minor foot asymmetry)   TU.9 with rollator  20.1 with rollator   24.1 with SPC  20.5 with rollator   19.2 with SPC   ABC Scale:  13.75%  15.63%           Assessment: Ade was initially referred to outpatient physical therapy secondary to ongoing balance concerns following CVA on 2022. She has attended a total of 18 PT sessions and progress note was completed today. She demonstrated overall stabilization of endurance and gait speed since last progress note. Her LE strength and power  given 5x STS time did improve from last testing and below falls risk cut off. Her TUG time significantly improved with SPC and is comparable to her time with a rollator. Both continue to put her at an increased falls risk. FGA was able to be completed today and puts her at elevated falls risk. Her score of 8/30 is significantly reduced from her previous episode of care at 12/30. She continues to be at an increased falls risk and making slow gradual progress to outstanding LTGs. She would benefit from continued skilled PT to progress her balance to reduce her falls risk and improve her independence and safety with household and community mobility. Without ongoing skilled PT, her mobility would likely decline with need for increased caregiver assistance.       Goals  STGs in 4 weeks  1. Pt will improve gait speed by at least 0.1 m/s with least restrictive device needed to improve safety with ambulation. - MET (with rollator)  2. Pt will improve TUG time by at least 5 seconds to decrease fall risk and improve mobility. - MET  3. Pt will improve 5xSTS score by at least 5 seconds needed to reduce fall risk. - MET  4. Pt will improve 6MWT by at least 100 feet with appropriate AD to improve functional mobility and endurance. - MET  5. Pt will be able to complete all bed mobility with no more than close supervision for improved independence. - MET     LTGs in 12 weeks  1. Pt will improve gait speed to at least 0.68 m/s with least restrictive device needed to improve safety with ambulation. - PROGRESSING  2. Pt will improve TUG score to <18 seconds to decrease fall risk and improve functional mobility. - PROGRESSING  3. Pt will improve 5xSTS score to <15 sec needed to reduce fall risk. - MET  4. Pt will improve 6MWT to at least 750 feet with appropriate AD to improve functional mobility. - PROGRESSING  5. Pt will demonstrate independence with HEP within 12 weeks. - PROGRESSING    New goals as of 2/2/2024 due end of POC  "(2/29/2024)  Pt will increase her FGA score by 4 points for improved balance (MCID 4 pts).         Plan: Continue per plan of care.  Progress treatment as tolerated.      Planned therapy interventions: balance, neuromuscular re-education, patient education, orthotic management and training, strengthening, stretching, therapeutic activities, therapeutic exercise, gait training, graded activity, home exercise program and coordination     Frequency: 2x week  Duration in weeks: 4     Plan of Care beginning date: 11/29/2023  Plan of Care expiration date: 2/29/2024     Short Term Goal Expiration Date:(12/29/2023)  Long Term Goal Expiration Date: (2/29/2024)  POC Expiration Date: (2/29/2024)        POC expires Unit limit Auth Expiration date PT/OT/ST + Visit Limit?   2/29/2024 BOMN 2/16/24 BOMN     2/23/24                        Visit/Unit Tracking  AUTH Status:  Date 1/8 1/12 1/18 1/22 1/26 1/29 2/2 12/20 12/27 1/2/24 1/5   8 visits Used 12 1  2 3 4 5 6 PN 8 9 10 PN 11   1/10 to 2/23 Remaining  0 7 6 5 4 3 2 4 3 2 1            Precautions HTN, a-fib, pacemaker        Manuals 1/26 1/29 2/2 1/18 1/22                   Pt education   POC, insurance visits remaining--asking for more visits             Neuro Re-Ed         STS From chair with BUE    R foot on 2\" step to increase L weight bearing   From chair with BUE    X15 reps From chair with BUE     X10 reps   HKM     Solo 1/4 length  3# L AW  With R SPC    X4 laps   Side stepping     Solo 1/4 length  3# L AW  With R SPC    X4 laps   Hurdles  Solo 1/4 length  With R SPC  3# L AW    Over 3 pool noodles     Fwd x4 laps (alt lead LE by length) Solo 1/4 length  With R SPC  3# L AW    Over 4 pool noodles     Fwd x4 laps (alt lead LE by length)  //bars (long)  BUE use    6\" hurdles (6)    Fwd x4 laps (alt lead LE by length)    Lat x3 laps    Bwd ambu  Solo 1/4 length  3# L AW   With R SPC    X4 laps  //bars (long)  BUE use    X3 laps    Focus on increased L step length Solo 1/4 " "length  3# L AW  With R SPC    X4 laps   Foam  Solo 1/4 length  R SPC  3# L AW    Across 4 airex pads pushed together    Fwd x3 laps  //bars (long)  BUE use    Across 5 airex pads pushed together    Fwd x4 laps    Lat x4 laps    Step taps     //bars   U/l UE use    Up to 6\" step   2x15 ea LE     Weave thru cones  Solo 1/4 length  3# L AW  With R SPC    X4 laps     Vc for increased L step length   Solo 1/4 length  3# L AW  With R SPC    X4 laps   Resisted ambu        Step ups Solo 1/4 length  Trial stairs    BUE use    Fwd x6 rds  Lat x6 rds       Walk with HT/HN Solo 1/4 length  3# L AW    X3 laps ea       Ther Ex        HEP review                                                                Ther Activity        Bed mobility                Gait Training        TM Solo TM BUE use    3# L AW    1.2 mph x5 min  1.4 mph x5 min    Vc for L step height Solo TM BUE use    3# L Aw    1.2  mph x4 min  1.2 mph 5% incline x6 min    Total 10 min   Solo TM BUE use    3# L AW    1.2 mph x10 min   AD training        Modalities                                               "

## 2024-02-04 ENCOUNTER — HOSPITAL ENCOUNTER (OUTPATIENT)
Dept: SLEEP CENTER | Facility: CLINIC | Age: 84
Discharge: HOME | End: 2024-02-04
Payer: MEDICARE

## 2024-02-04 DIAGNOSIS — G47.31 CENTRAL SLEEP APNEA: ICD-10-CM

## 2024-02-04 PROCEDURE — 95811 POLYSOM 6/>YRS CPAP 4/> PARM: CPT | Performed by: INTERNAL MEDICINE

## 2024-02-04 PROCEDURE — 95811 POLYSOM 6/>YRS CPAP 4/> PARM: CPT

## 2024-02-05 ENCOUNTER — OFFICE VISIT (OUTPATIENT)
Dept: PHYSICAL THERAPY | Facility: CLINIC | Age: 84
End: 2024-02-05
Payer: MEDICARE

## 2024-02-05 DIAGNOSIS — Z86.73 HISTORY OF CVA (CEREBROVASCULAR ACCIDENT): Primary | ICD-10-CM

## 2024-02-05 DIAGNOSIS — G47.31 CENTRAL SLEEP APNEA: Primary | ICD-10-CM

## 2024-02-05 PROCEDURE — 97116 GAIT TRAINING THERAPY: CPT

## 2024-02-05 PROCEDURE — 97112 NEUROMUSCULAR REEDUCATION: CPT

## 2024-02-05 NOTE — PROGRESS NOTES
Daily Note     Today's date: 2024  Patient name: Ade Moon  : 1940  MRN: [id number]  Referring provider: Deepak Hermosillo MD  Dx:   Encounter Diagnosis     ICD-10-CM    1. History of CVA (cerebrovascular accident)  Z86.73                      Subjective: Ade states that she is doing ok today. She had a sleep study done last night but did seem to sleep well.       Objective: See treatment diary below      Assessment: Ade tolerated treatment fair. She demonstrated increased fatigue of L LE on treadmill at increased speed today, needing a short rest break for LE muscular recovery. She was challenged with L foot clearance during hurdles and stepping up on various surfaces and steps today. With verbal cues, for increased foot clearance and her verbally repeating it as well, her clearance did improve. She initially had reduction in step height and increase in imbalance when walking with head movement that did improve with repetition within session. Revisited idea of L AFO and she reported that she has one. Recommended she bring it in and she expressed that it causes discomfort. She was advised to call the company that she got the brace from to see if they can make adjustments. She verbalized understanding. She would benefit from continued skilled PT.       Plan: Continue per plan of care.  Progress treatment as tolerated.       Short Term Goal Expiration Date:(2023)  Long Term Goal Expiration Date: (2024)  POC Expiration Date: (2024)        POC expires Unit limit Auth Expiration date PT/OT/ST + Visit Limit?   2024 BOMN 24 BOMN     24                        Visit/Unit Tracking  AUTH Status:  Date 24   8 visits Used 12 1  2 3 4 5 6 PN 7 9 10 PN 11   1/10 to  Remaining  0 7 6 5 4 3 2 1 3 2 1            Precautions HTN, a-fib, pacemaker        Manuals                    Pt education   POC,  "insurance visits remaining--asking for more visits             Neuro Re-Ed         STS From chair with BUE    R foot on 2\" step to increase L weight bearing   From chair with BUE     2x10     Vc for full arc of motion From chair with BUE     X10 reps   HKM     Solo 1/4 length  3# L AW  With R SPC    X4 laps   Side stepping    Solo 1/4 length  3# L AW  With R SPC    X3 laps Solo 1/4 length  3# L AW  With R SPC    X4 laps   Hurdles  Solo 1/4 length  With R SPC  3# L AW    Over 3 pool noodles     Fwd x4 laps (alt lead LE by length) Solo 1/4 length  With R SPC  3# L AW    Over 4 pool noodles     Fwd x4 laps (alt lead LE by length)  Solo 1/4 length  3# L AW  With R SPC    4\" hurdles (4)    Fwd x2 laps    Intermittent min A for L foot clearance    Bwd ambu  Solo 1/4 length  3# L AW   With R SPC    X4 laps   Solo 1/4 length  3# L AW  With R SPC    X4 laps   Foam  Solo 1/4 length  R SPC  3# L AW    Across 4 airex pads pushed together    Fwd x3 laps      Step taps         Weave thru cones  Solo 1/4 length  3# L AW  With R SPC    X4 laps     Vc for increased L step length   Solo 1/4 length  3# L AW  With R SPC    X4 laps   Resisted ambu        Step ups Solo 1/4 length  Trial stairs    BUE use    Fwd x6 rds  Lat x6 rds   Solo 1/4 length  3# L AW  R SPC    Stepping up and over: airex pad, 4\" step, airex pad    Fwd x3 laps    Walk with HT/HN Solo 1/4 length  3# L AW    X3 laps ea   Solo 1/4 length  3# L AW  R SPC    X3 laps ea    Ther Ex        HEP review                                                                Ther Activity        Bed mobility                Gait Training        TM Solo TM BUE use    3# L AW    1.2 mph x5 min  1.4 mph x5 min    Vc for L step height Solo TM BUE use    3# L Aw    1.2  mph x4 min  1.2 mph 5% incline x6 min    Total 10 min  Solo TM BUE use    3# L AW    1.2 mph x5 min  1.4 mph x1 min 30 sec    Seated rest due to L LE fatigue    1.4 mph x4 min Solo TM BUE use    3# L AW    1.2 mph x10 min   AD " training        Modalities

## 2024-02-05 NOTE — PROGRESS NOTES
Patient's ASV titration study was interpreted.     Recommend initiation of Auto-ASV at the following settings: Min EPAP 8cm H20, max EPAP 15cm H20, Min PS 3cm H20, Max PS 15cm H20, autobackup. DME was ordered.    Radha Morgan

## 2024-02-05 NOTE — PROGRESS NOTES
Sleep Study Documentation    Pre-Sleep Study       Sleep testing procedure explained to patient:NO    Patient napped prior to study:NO    Caffeine:Dayshift worker after 12PM.  Caffeine use:NO    Alcohol:Dayshift workers after 5PM: Alcohol use:NO    Typical day for patient:YES       Study Documentation    Sleep Study Indications: central sleep apnea, Excessive daytime sleepiness    Sleep Study: Treatment   Optimal PAP pressure: 11/3/14 cmH2O  Leak:Small  Snore:Eliminated  REM Obtained:yes  Supplemental O2: no    Minimum SaO2 90%  Baseline SaO2 98%  PAP mask tried (list all) small [employer] full face mask interface  PAP mask choice (final) small [employer] full face mask interface  PAP mask type:full face  PAP pressure at which snoring was eliminated 11/3/14 cmH2O  Minimum SaO2 at final PAP pressure 90%  Mode of Therapy:ASV max EPAP:11  min EPAP:5  max pressure support:14  min pressure support:3  rate setting:-  rise time:-  flex:-  time inspired:-  max pressure:-     ETCO2:No  CPAP changed to BiPAP:No    Mode of Therapy:ASV max EPAP:11  min EPAP:3  max pressure support:14  min pressure support:3  rate setting:-  rise time:-  flex:-  time inspired:-  max pressure:-       EKG abnormalities: yes:  EPOCH example and comments: all of them     EEG abnormalities: no    Were abnormal behaviors in sleep observed:NO    Is Total Sleep Study Recording Time < 2 hours: N/A    Is Total Sleep Study Recording Time > 2 hours but study is incomplete: N/A    Is Total Sleep Study Recording Time 6 hours or more but sleep was not obtained: NO    Patient classification: unemployed       Post-Sleep Study    Medication used at bedtime or during sleep study:NO    Patient reports time it took to fall asleep:20 to 30 minutes    Patient reports waking up during study:1 to 2 times.  Patient reports returning to sleep without difficulty.    Patient reports sleeping 4 to 6 hours with dreaming.    Does the Patient feel this is a typical  night of sleep:better than usual    Patient rated sleepiness: Not sleepy or tired    PAP treatment:yes: Post PAP treatment patient reports feeling better and  would wear PAP mask at home.

## 2024-02-07 ENCOUNTER — OFFICE VISIT (OUTPATIENT)
Dept: PHYSICAL THERAPY | Facility: CLINIC | Age: 84
End: 2024-02-07
Payer: MEDICARE

## 2024-02-07 DIAGNOSIS — Z86.73 HISTORY OF CVA (CEREBROVASCULAR ACCIDENT): Primary | ICD-10-CM

## 2024-02-07 PROCEDURE — 97112 NEUROMUSCULAR REEDUCATION: CPT

## 2024-02-07 PROCEDURE — 97116 GAIT TRAINING THERAPY: CPT

## 2024-02-07 NOTE — PROGRESS NOTES
Daily Note     Today's date: 2024  Patient name: Ade Moon  : 1940  MRN: [id number]  Referring provider: Deepak Hermosillo MD  Dx:   Encounter Diagnosis     ICD-10-CM    1. History of CVA (cerebrovascular accident)  Z86.73                      Subjective: Ade brought her L AFO with her today. She notes that she does not wear it as it hurts her toes and tends to slide out of her shoe. She said that her son says its her 2nd one so insurance won't get her another one.      Objective: See treatment diary below      Assessment: Ade tolerated treatment well. Time was spent donning and ambulating with her L PLS AFO. It did improve her foot clearance; however, her heel continually kept sliding out of her shoe. Discussed trying the brace with a different shoe, a sneaker with laces or velcro as they tend to have deeper heel cups than the slip on shoes she usually wears. She will discuss this with her son. She had reduced L toe clearance throughout when L AFO was doffed. In order to increase clearance, she tends to increase L UE flexion pattern. She would benefit from continued skilled PT to increase her confidence and safety with least restrictive AD and reduce her falls risk.        Plan: Continue per plan of care.  Progress treatment as tolerated.       Short Term Goal Expiration Date:(2023)  Long Term Goal Expiration Date: (2024)  POC Expiration Date: (2024)        POC expires Unit limit Auth Expiration date PT/OT/ST + Visit Limit?   2024 BOMN 24 BOMN     24                        Visit/Unit Tracking  AUTH Status:  Date 24   8 visits Used 12 1  2 3 4 5 6 PN 7 8 10 PN 11   1/10 to  Remaining  0 7 6 5 4 3 2 1 0 2 1     **auth requested & pending**       Precautions HTN, a-fib, pacemaker        Manuals                    Pt education   POC, insurance visits remaining--asking for more visits            "  Neuro Re-Ed         STS From chair with BUE    R foot on 2\" step to increase L weight bearing   From chair with BUE     2x10     Vc for full arc of motion From chair with BUE    X10     Vc for full arc of motion   HKM        Side stepping    Solo 1/4 length  3# L AW  With R SPC    X3 laps    Hurdles  Solo 1/4 length  With R SPC  3# L AW    Over 3 pool noodles     Fwd x4 laps (alt lead LE by length) Solo 1/4 length  With R SPC  3# L AW    Over 4 pool noodles     Fwd x4 laps (alt lead LE by length)  Solo 1/4 length  3# L AW  With R SPC    4\" hurdles (4)    Fwd x2 laps    Intermittent min A for L foot clearance Solo 1/4 length  With R SPC    4\" hurdles (4)    Fwd x3 laps (1 lap with L AFO)   Bwd ambu  Solo 1/4 length  3# L AW   With R SPC    X4 laps   Solo 1/4 length  With R SPC    X3 laps   Foam  Solo 1/4 length  R SPC  3# L AW    Across 4 airex pads pushed together    Fwd x3 laps   Solo 1/4 length  R SPC    Across 4 airex pads pushed together    Fwd x3 laps   Step taps      Up to 6\" step with R SPC    2x10 ea LE   Weave thru cones  Solo 1/4 length  3# L AW  With R SPC    X4 laps     Vc for increased L step length   Solo 1/4 length  With R SPC    X4 laps     Resisted ambu        Step ups Solo 1/4 length  Trial stairs    BUE use    Fwd x6 rds  Lat x6 rds   Solo 1/4 length  3# L AW  R SPC    Stepping up and over: airex pad, 4\" step, airex pad    Fwd x3 laps    Walk with HT/HN Solo 1/4 length  3# L AW    X3 laps ea   Solo 1/4 length  3# L AW  R SPC    X3 laps ea    Ther Ex        HEP review                                                                Ther Activity        Bed mobility                Gait Training        TM Solo TM BUE use    3# L AW    1.2 mph x5 min  1.4 mph x5 min    Vc for L step height Solo TM BUE use    3# L Aw    1.2  mph x4 min  1.2 mph 5% incline x6 min    Total 10 min  Solo TM BUE use    3# L AW    1.2 mph x5 min  1.4 mph x1 min 30 sec    Seated rest due to L LE fatigue    1.4 mph x4 min Solo " TM unavailable   AD training     L AFO don    X50 ft with rollator   Modalities

## 2024-02-09 ENCOUNTER — TELEPHONE (OUTPATIENT)
Dept: NEUROLOGY | Facility: CLINIC | Age: 84
End: 2024-02-09

## 2024-02-09 ENCOUNTER — TELEPHONE (OUTPATIENT)
Dept: PHYSICAL THERAPY | Facility: CLINIC | Age: 84
End: 2024-02-09

## 2024-02-09 NOTE — TELEPHONE ENCOUNTER
PT called Ade to discuss insurance decision following peer to peer. Discussed 2 more visits approved and plan to spread them out over the next 2 weeks to ensure good HEP for her as well as trial other shoes with AFO if able. She verbalized understanding and schedule adjusted. Will reprint schedule at next visit.

## 2024-02-12 ENCOUNTER — OFFICE VISIT (OUTPATIENT)
Dept: PHYSICAL THERAPY | Facility: CLINIC | Age: 84
End: 2024-02-12
Payer: MEDICARE

## 2024-02-12 DIAGNOSIS — Z86.73 HISTORY OF CVA (CEREBROVASCULAR ACCIDENT): Primary | ICD-10-CM

## 2024-02-12 PROCEDURE — 97112 NEUROMUSCULAR REEDUCATION: CPT

## 2024-02-12 PROCEDURE — 97150 GROUP THERAPEUTIC PROCEDURES: CPT

## 2024-02-12 NOTE — PROGRESS NOTES
Daily Note     Today's date: 2024  Patient name: Ade Moon  : 1940  MRN: [id number]  Referring provider: Deepak Hermosillo MD  Dx:   Encounter Diagnosis     ICD-10-CM    1. History of CVA (cerebrovascular accident)  Z86.73                    1 on 1 treatment: 1100 to 1135 = 35 min  Group treatment: 1135 to 1145 = 10 min      Subjective: Ade states that she is doing ok today. Having some R leg pain today which is unusual for her. Denies a fall on it. She discussed getting new shoes with her son and he is working on it.      Objective: See treatment diary below      Assessment: Ade tolerated treatment well. Discussed POC with insurance authorizing 2 visits with goal to transition to HEP with help of family and caregiver. Time was spent reviewing HEP with safety factors such as holding onto the counter vs using rollator based on her comfort level. She verbalized understanding and demonstrated proper form of all exercises with intermittent reduced L foot clearance. Will complete final appt next week to allow time to trial exercises at home it ensure all questions can be answered. She is in agreement with this plan.      Plan: Potential discharge next visit.     Short Term Goal Expiration Date:(2023)  Long Term Goal Expiration Date: (2024)  POC Expiration Date: (2024)        POC expires Unit limit Auth Expiration date PT/OT/ST + Visit Limit?   2024 BOMN 24 BOMN     24                        Visit/Unit Tracking  AUTH Status:  Date    2 visits Used 12 1  2 3 4 5 6 PN 7 8 2 11    to 3/8 Remaining  0 7 6 5 4 3 2 1 0 1 1            Precautions HTN, a-fib, pacemaker        Manuals                    Pt education   POC, insurance visits remaining--asking for more visits             Neuro Re-Ed         STS Mini squats x20   From chair with BUE     2x10     Vc for full arc of motion From chair with  "BUE    X10     Vc for full arc of motion   HKM //bars (long)  U/l UE use    X3 laps       Side stepping //bars (long)  BUE use    X3 laps   Solo 1/4 length  3# L AW  With R SPC    X3 laps    Hurdles   Solo 1/4 length  With R SPC  3# L AW    Over 4 pool noodles     Fwd x4 laps (alt lead LE by length)  Solo 1/4 length  3# L AW  With R SPC    4\" hurdles (4)    Fwd x2 laps    Intermittent min A for L foot clearance Solo 1/4 length  With R SPC    4\" hurdles (4)    Fwd x3 laps (1 lap with L AFO)   Bwd ambu //bars (long)  U/l UE use    X3 laps Solo 1/4 length  3# L AW   With R SPC    X4 laps   Solo 1/4 length  With R SPC    X3 laps   Foam  Solo 1/4 length  R SPC  3# L AW    Across 4 airex pads pushed together    Fwd x3 laps   Solo 1/4 length  R SPC    Across 4 airex pads pushed together    Fwd x3 laps   Step taps      Up to 6\" step with R SPC    2x10 ea LE   Weave thru cones  Solo 1/4 length  3# L AW  With R SPC    X4 laps     Vc for increased L step length   Solo 1/4 length  With R SPC    X4 laps     Resisted ambu        Step ups    Solo 1/4 length  3# L AW  R SPC    Stepping up and over: airex pad, 4\" step, airex pad    Fwd x3 laps    Walk with HT/HN //bars (long)  U/l UE use    X3 laps ea   Solo 1/4 length  3# L AW  R SPC    X3 laps ea    Ther Ex        HEP review                                                                Ther Activity        Bed mobility                Gait Training        TM Solo TM  BUE use    1.2 mph x8 min Solo TM BUE use    3# L Aw    1.2  mph x4 min  1.2 mph 5% incline x6 min    Total 10 min  Solo TM BUE use    3# L AW    1.2 mph x5 min  1.4 mph x1 min 30 sec    Seated rest due to L LE fatigue    1.4 mph x4 min Solo TM unavailable   AD training     L AFO don    X50 ft with rollator   Modalities                             Access Code: [email or web address]  URL: [email or web address]  Date: 02/12/2024  Prepared by: Ciara Hernandez    Exercises  - Side Stepping with Counter Support  - 1 x " daily - 3-4 x weekly - 3 sets - 10 reps  - Standing March with Counter Support  - 1 x daily - 3-4 x weekly - 3 sets - 10 reps  - Backward Walking with Counter Support  - 1 x daily - 3-4 x weekly - 3 sets - 10 reps  - Mini Squat with Counter Support  - 1 x daily - 3-4 x weekly - 3 sets - 10 reps  - Walking with Head Rotation  - 1 x daily - 3-4 x weekly - 3 sets - 10 reps  - Walking with Head Nod  - 1 x daily - 3-4 x weekly - 3 sets - 10 reps

## 2024-02-13 ENCOUNTER — TELEPHONE (OUTPATIENT)
Dept: NEUROLOGY | Facility: CLINIC | Age: 84
End: 2024-02-13

## 2024-02-15 ENCOUNTER — OFFICE VISIT (OUTPATIENT)
Dept: NEUROLOGY | Facility: CLINIC | Age: 84
End: 2024-02-15
Payer: MEDICARE

## 2024-02-15 VITALS
SYSTOLIC BLOOD PRESSURE: 118 MMHG | OXYGEN SATURATION: 99 % | BODY MASS INDEX: 24.55 KG/M2 | HEART RATE: 80 BPM | DIASTOLIC BLOOD PRESSURE: 70 MMHG | WEIGHT: 138.6 LBS | TEMPERATURE: 97.2 F

## 2024-02-15 DIAGNOSIS — I77.9 BILATERAL CAROTID ARTERY DISEASE (HCC): ICD-10-CM

## 2024-02-15 DIAGNOSIS — G47.33 OSA (OBSTRUCTIVE SLEEP APNEA): ICD-10-CM

## 2024-02-15 DIAGNOSIS — I10 ESSENTIAL HYPERTENSION: Primary | ICD-10-CM

## 2024-02-15 DIAGNOSIS — I48.20 CHRONIC ATRIAL FIBRILLATION (HCC): ICD-10-CM

## 2024-02-15 DIAGNOSIS — E78.5 HYPERLIPIDEMIA LDL GOAL <100: ICD-10-CM

## 2024-02-15 DIAGNOSIS — M79.672 LEFT FOOT PAIN: ICD-10-CM

## 2024-02-15 DIAGNOSIS — R25.2 SPASTICITY AS LATE EFFECT OF CEREBROVASCULAR ACCIDENT (CVA): ICD-10-CM

## 2024-02-15 DIAGNOSIS — Z86.73 HISTORY OF CVA (CEREBROVASCULAR ACCIDENT): ICD-10-CM

## 2024-02-15 DIAGNOSIS — I69.398 SPASTICITY AS LATE EFFECT OF CEREBROVASCULAR ACCIDENT (CVA): ICD-10-CM

## 2024-02-15 PROCEDURE — 99214 OFFICE O/P EST MOD 30 MIN: CPT

## 2024-02-15 RX ORDER — GABAPENTIN 100 MG/1
100 CAPSULE ORAL 3 TIMES DAILY PRN
Qty: 90 CAPSULE | Refills: 1 | Status: SHIPPED | OUTPATIENT
Start: 2024-02-15

## 2024-02-15 NOTE — PROGRESS NOTES
Patient ID: Ade Moon is a 83 y.o. female who presents to the [facility].    Assessment/Plan:    History of Cerebrovascular Accident:    I had the pleasure of seeing Ade today in the office at [facility] in [place].  She is presenting today for an office visit follow-up in regard to her history of stroke.  At this point in time, patient is doing well and not experiencing any new strokelike symptoms.  Still having residual deficits of left upper extremity weakness and left lower extremity weakness.  However, on neurologic examination it seems that this has significantly improved with the physical therapy that she has recently been receiving.  She does still have the chronic left foot drop.  Patient had yet to been able to see physiatry and Dr. Kim to discuss Botox injections for the spasticity and foot drop.  Will be contacting one of the medical assistants that help schedule for Dr. De Padua and see if we can get the patient on the schedule as it has been quite sometime.  The patient does note that she has significant pain of the left foot.  Not quite sure if it is related to the tight fitting shoes that she has, which I encouraged the patient's son to help her look into more comfortable shoes or wider shoes at least for the left foot.  It is noted that the patient's foot will slip out of the shoe when she is wearing the brace and is not the most comfortable.  With the intermittent pain that the patient described, I did offer her gabapentin 100 mg, she can take 1 capsule by mouth as needed 3 times daily for left foot pain.  Again, patient does not have to utilize this medication on a daily basis just only when she is having more significant issues with her left foot pain.  Recommended that the patient continue with Pradaxa 150 mg twice daily and rosuvastatin 5 mg once daily for secondary stroke prevention.  Patient does need to have a carotid artery ultrasound  before her next appointment by myself.  Besides this, patient is doing relatively well and can follow-up in about 9 months time with myself.      -Added on gabapentin 100 mg, take 1 capsule by mouth up to 3 times daily as needed for left-sided foot pain.  Based on the patient's creatinine clearance which was 32, patient should be able to tolerate 100 mg 3 times daily of gabapentin.  Again, this is to be used as needed for when the patient is having any significant pain.  If she reports any significant side effects of the medication she can certainly let us know and report them to us.  -Complete VAS carotid artery ultrasound at this time for mild atherosclerotic disease of the bilateral carotid arteries as indicated on most recent CTA head and neck.  Patient can schedule this imaging before she leaves today.  -Referral to physiatry placed, specifically placed to Dr. De Padua for potential Botox therapy in regards to the patient's left lower extremity paresis/spasticity and chronic left foot drop.  Will follow-up on this referral and message one of the medical assistants who runs Dr. De Padua and see if the patient could be seen sooner.  -Continue to follow with sleep medicine in regards to testing for potential sleep apnea and the need for a CPAP in the future.  -Continue to follow with cardiology in regards to the patient's atrial fibrillation, hypertension, pulmonary hypertension, and her work-up for potential cardiomyopathy due to amyloidosis.  Will again defer blood pressure management to cardiology, however patient should try to ultimately stay around the range of 130/80 in regards to her blood pressure and should not be going much higher than this number.    - For ongoing stroke prevention continue: Pradaxa 150 mg twice daily, rosuvastatin 5 mg once daily  - Discussed the importance of antiplatelet management with the patient to prevent future strokes.   - Recommend to check blood pressure occasionally away  from the doctor's office to make sure that those numbers are typically less than 130/80.  If they are frequently higher than that, we recommend checking a little more often and to follow up with primary care team   - Will defer to primary care team for monitoring of cholesterol panel and blood sugar numbers with target LDL cholesterol of less than 70 and hemoglobin A1c less than 7%  - Recommend following a low salt, mediterranean diet   - Recommend routine physical exercise as tolerated     We will plan for her to return to the office in 9 months time to see on of the APPs or Dr. Albarado but would be happy to see her sooner if the need should arise.  If she has any symptoms concerning for TIA or stroke including sudden painless loss of vision or double vision, difficulty speaking or swallowing, vertigo/room spinning that does not quickly resolve, or weakness/numbness/loss of coordination affecting 1 side of the face or body she should proceed by ambulance to the nearest emergency room immediately.     Subjective:    HPI      For Review:    The patient was last seen in the office on 08/15/2023 by myself.  Patient was being evaluated in regards to a history of stroke follow-up appointment.  Noted that the patient was still having residual deficits of the left upper extremity and left lower extremity weakness.  Noted that the patient was also having chronic left foot drop as well.  No new strokelike symptoms reported.  Recommended that the patient have a repeat referral and consultation with physiatry at this time.  Patient's family was interested in looking into Botox therapy for the left lower extremity paresis/spasticity and chronic left foot drop that the patient was having.  Patient was also quite interested in this at this time to potentially help with the foot drop.  Wanted the patient to repeat her lipid panel before next appointment to make sure that she is on the appropriate dosage of Crestor 5 mg daily.   Wanted the patient to have a carotid artery ultrasound at the last appointment to evaluate for mild atherosclerotic disease of the bilateral ICAs.  Making sure there is no significant flow-limiting stenosis at this point in time.  Recommend she continue to follow with sleep medicine in regards to testing for potential sleep apnea or CPAP in the future.  Recommended should she also continue to follow with her cardiologist for her multiple cardiac comorbidities at this time.  She can continue with Pradaxa 150 mg twice daily for secondary stroke prevention and rosuvastatin 5 mg once daily for secondary stroke prevention as well.  The patient is doing well at controlling her vascular risk factors at this moment in time.      Interval History:      New stroke symptoms/residual symptoms:    Any new, sudden onset weakness, numbness, facial droop, slurred speech, difficulty speaking, trouble swallowing, persistent vertigo, or sudden double vision or vision loss? No new stroke-like symptoms    Residual symptoms include: weakness of the left UE/LE: upper extremity and lower extremity, left foot drop noted     Stroke Etiology and Risk Factor modification:    This was a(n) embolic stroke, most likely related to Cardioembolic: Non-Valvular A-Fib    Stroke risk factors were evaluated including: Atrial fibrillation, hypertension, hyperlipidemia    AP/AC therapy: Pradaxa 150  mg twice daily. No significant internal bleeding or bruising noted.     Statin therapy: Rosuvastatin 5 mg daily     Blood pressure today and as of late: 118/70 BP today in the office.    Most recent LDL: 50 mg/dL     Most recent hemoglobin A1C: 5.4%    Cardiology evaluation? Any cardiac monitoring required?:  Noted that the patient does have a cardiac pacemaker implant, does follow with cardiology in regard to management of atrial fibrillation.    Endocrinology evaluation? Following proper glycemic treatment/diet?  None    Lifestyle  history/modifications:    Diet/Exercise regimen: Does eat relatively healthy throughout the day, trying to remain active and keep moving with walker despite having issues with left sided foot drop.     Any physical therapy, occupational therapy or speech therapy performed/required at this time?: Still doing PT for now, will finish up at the end of this month.     Any difficulty with sleep?  No issues with actually sleeping reported.    Any history of sleep apnea apnea? CPAP compliance?: Still following with sleep medicine, awaiting what sleep assistive device she will use for sleep apnea.     Post-stroke depression/anxiety?  None    Any history of smoking?  No history of smoking noted.    Additional History:    More pain in the area of the left foot from last time. She uses the ankle brace, but does cause her to have a lot more pain at this time. Did not receive consultation with physiatry at this moment in time. Does have issues with wearing the brace and does have swelling of the left food compared to the right, her brace will slip out of the shoe more often than not.  She does currently note seeing at podiatrist as well.    Calculated creatinine clearance: 32 mL/min    Lab Results   Component Value Date/Time    CHOLESTEROL 118 08/30/2023 09:26 AM     Lab Results   Component Value Date/Time    TRIG 78 08/30/2023 09:26 AM    TRIG 123 11/25/2015 08:40 AM     Lab Results   Component Value Date/Time    HDL 52 08/30/2023 09:26 AM    HDL 48 11/25/2015 08:40 AM     Lab Results   Component Value Date/Time    LDLCALC 50 08/30/2023 09:26 AM    LDLCALC 70 11/25/2015 08:40 AM       Lab Results   Component Value Date/Time    HGBA1C 5.4 08/13/2022 05:55 AM    HGBA1C 5.8 (H) 07/24/2014 10:00 AM     Lab Results   Component Value Date/Time     08/13/2022 05:55 AM     07/24/2014 10:00 AM           Past Medical History:   Diagnosis Date    A-fib (HCC)     Anemia     Arthritis     Breast pain, right     Chest pain on  breathing     Colon polyp     Cough     Diverticulosis     Encounter for screening colonoscopy     H/O sick sinus syndrome     Heart murmur     High cholesterol     History of atrial fibrillation     Rate ontrolled. On xarelto 15mg (creatinine 50) Followed by Dr. Randolph with Cardiology     History of weight loss     Report loose fitting clothes. Weight stable (3lbs difference). Last colo showed small tubular adenoma x2. Breast biopsy last showed fibrocystic changes. TSH wnl. Will check cbc, bmp, spep.        Hx of long term use of blood thinners     Hypertension     Irregular heart beat     RICH (obstructive sleep apnea)     Pacemaker     Preop examination     Shortness of breath     Viral bronchitis        Current Outpatient Medications:     cyanocobalamin 1,000 mcg/mL, INJECT 1 ML (1,000 MCG TOTAL) INTO A MUSCLE EVERY 30 (THIRTY) DAYS, Disp: 3 mL, Rfl: 0    furosemide (LASIX) 20 mg tablet, Take 2 tablets (40 mg total) by mouth daily, Disp: 180 tablet, Rfl: 5    Incontinence Supply Disposable (RA Adult Wipes) MISC, Use 4 (four) times a day, Disp: 64 each, Rfl: 10    losartan (COZAAR) 50 mg tablet, Take 2 tablets (100 mg total) by mouth daily, Disp: 180 tablet, Rfl: 5    nebivolol (BYSTOLIC) 5 mg tablet, TAKE 1 TABLET (5 MG TOTAL) BY MOUTH DAILY, Disp: 90 tablet, Rfl: 3    pantoprazole (PROTONIX) 40 mg tablet, TAKE 1 TABLET (40 MG TOTAL) BY MOUTH DAILY IN THE EARLY MORNING, Disp: 90 tablet, Rfl: 3    Pradaxa 150 MG capsu, SB JUAN ANTONIO CAPSULA POR VIA ORAL DOS VECES AL KATRINA, Disp: 180 capsule, Rfl: 3    rosuvastatin (CRESTOR) 5 mg tablet, TAKE 1 TABLET (5 MG TOTAL) BY MOUTH DAILY, Disp: 90 tablet, Rfl: 3    spironolactone (ALDACTONE) 25 mg tablet, Take 1 tablet (25 mg total) by mouth daily, Disp: 90 tablet, Rfl: 5    verapamil (CALAN-SR) 240 mg CR tablet, TAKE 1 TABLET (240 MG TOTAL) BY MOUTH DAILY AT BEDTIME, Disp: 90 tablet, Rfl: 3    acetaminophen (TYLENOL) 325 mg tablet, Take 2 tablets (650 mg total) by mouth every 6  (six) hours as needed for mild pain (Patient not taking: Reported on 8/15/2023), Disp: , Rfl: 0    ammonium lactate (LAC-HYDRIN) 12 % cream, APPLY TOPICALLY AS NEEDED FOR DRY SKIN, Disp: 385 g, Rfl: 1    Blood Pressure Monitoring (Blood Pressure Cuff) MISC, Use every other day For HTN (Patient not taking: Reported on 8/15/2023), Disp: 1 each, Rfl: 0    Empagliflozin (Jardiance) 10 MG TABS tablet, Take 1 tablet (10 mg total) by mouth every morning (Patient not taking: Reported on 2/15/2024), Disp: 90 tablet, Rfl: 5    melatonin 3 mg, TAKE 1 TABLET (3 MG TOTAL) BY MOUTH EVERY EVENING (Patient not taking: Reported on 11/1/2023), Disp: 90 tablet, Rfl: 3    menthol-methyl salicylate (BENGAY) 10-15 % cream, Apply topically 2 (two) times a day (Patient not taking: Reported on 11/22/2023), Disp: , Rfl: 0    nitroglycerin (NITROSTAT) 0.4 mg SL tablet, Place 1 tablet (0.4 mg total) under the tongue every 5 (five) minutes as needed for chest pain (Patient not taking: Reported on 8/3/2023), Disp: , Rfl: 0    Current Facility-Administered Medications:     cyanocobalamin injection 1,000 mcg, 1,000 mcg, Intramuscular, Q30 Days, Deepak Hermosillo MD, 1,000 mcg at 01/22/24 1558     Objective:    Physical Exam:                                                                 Vitals:            Constitutional:    /70 (BP Location: Right arm, Patient Position: Sitting, Cuff Size: Adult)   Pulse 80   Temp (!) 97.2 °F (36.2 °C) (Temporal)   Wt 62.9 kg (138 lb 9.6 oz)   SpO2 99%   BMI 24.55 kg/m²   BP Readings from Last 3 Encounters:   02/15/24 118/70   01/08/24 115/71   11/24/23 148/66     Pulse Readings from Last 3 Encounters:   02/15/24 80   01/08/24 87   11/24/23 84         Well developed, well nourished, well groomed. No dysmorphic features.       Psychiatric:  Normal behavior and appropriate affect        Neurological Examination:     Mental status/cognitive function:   Orientated to time, place and person. Recent and remote  memory intact. Attention span and concentration as well as fund of knowledge are appropriate for age. Normal language and spontaneous speech.     Cranial Nerves:  II-visual fields full.   III, IV, VI-Pupils were equal, round, and reactive to light and accomodation. Extraocular movements were full and conjugate without nystagmus. Conjugate gaze, normal smooth pursuits, normal saccades   V-facial sensation symmetric.    VII-facial expression symmetric, intact forehead wrinkle, strong eye closure, slight facial asymmetry on the right (patient can correct smile when asked to)  VIII-hearing grossly intact bilaterally   IX, X-palate elevation symmetric, no dysarthria.   XI-shoulder shrug strength intact    XII-tongue protrusion midline.    Motor Exam: Increased bulk and tone of the left lower extremity at this time.  Power/strength 5/5 of the bilateral upper and lower extremities, noted significant improvement of the left upper and lower extremity 3 therapy.  Spasticity of the left lower extremity with left foot drop noted.  Sensory: grossly intact light touch in all extremities.   Reflexes:   Right: brachioradialis 2+, biceps 2+, knee 2+  Left: brachioradialis 3+, biceps 3+, knee 3+  Coordination: Finger nose finger intact bilaterally, no apparent dysmetria, ataxia or tremor noted  Gait: Uses a walker currently, slightly unsteady gait.      ROS:    Review of Systems   Constitutional:  Negative for appetite change, fatigue and fever.   HENT: Negative.  Negative for hearing loss, tinnitus, trouble swallowing and voice change.    Eyes: Negative.  Negative for photophobia, pain and visual disturbance.   Respiratory: Negative.  Negative for shortness of breath.    Cardiovascular: Negative.  Negative for palpitations.   Gastrointestinal: Negative.  Negative for nausea and vomiting.   Endocrine: Negative.  Negative for cold intolerance.   Genitourinary: Negative.  Negative for dysuria, frequency and urgency.   Musculoskeletal:   Negative for back pain, gait problem, myalgias, neck pain and neck stiffness.   Skin: Negative.  Negative for rash.   Allergic/Immunologic: Negative.    Neurological:  Positive for numbness. Negative for dizziness, tremors, seizures, syncope, facial asymmetry, speech difficulty, weakness, light-headedness and headaches.   Hematological: Negative.  Does not bruise/bleed easily.   Psychiatric/Behavioral: Negative.  Negative for confusion, hallucinations and sleep disturbance.    All other systems reviewed and are negative.      I have spent 30 minutes today on this case including chart review, performing history and exam, patient counseling, and documentation/communication      Krish Haskins PA-C  2/15/2024 9:35 AM

## 2024-02-15 NOTE — PATIENT INSTRUCTIONS
History of Cerebrovascular Accident:    -Added on gabapentin 100 mg, take 1 capsule by mouth up to 3 times daily as needed for left-sided foot pain.  Based on the patient's creatinine clearance which was 32, patient should be able to tolerate 100 mg 3 times daily of gabapentin.  Again, this is to be used as needed for when the patient is having any significant pain.  If she reports any significant side effects of the medication she can certainly let us know and report them to us.  -Complete VAS carotid artery ultrasound at this time for mild atherosclerotic disease of the bilateral carotid arteries as indicated on most recent CTA head and neck.  Patient can schedule this imaging before she leaves today.  -Referral to physiatry placed, specifically placed to Dr. Martinez for potential Botox therapy in regards to the patient's left lower extremity paresis/spasticity and chronic left foot drop.  Will follow-up on this referral and message one of the medical assistants who runs Dr. Rodrigo Poole and see if the patient could be seen sooner.  -Continue to follow with sleep medicine in regards to testing for potential sleep apnea and the need for a CPAP in the future.  -Continue to follow with cardiology in regards to the patient's atrial fibrillation, hypertension, pulmonary hypertension, and her work-up for potential cardiomyopathy due to amyloidosis.  Will again defer blood pressure management to cardiology, however patient should try to ultimately stay around the range of 130/80 in regards to her blood pressure and should not be going much higher than this number.    - For ongoing stroke prevention continue: Pradaxa 150 mg twice daily, rosuvastatin 5 mg once daily  - Discussed the importance of antiplatelet management with the patient to prevent future strokes.   - Recommend to check blood pressure occasionally away from the doctor's office to make sure that those numbers are typically less than 130/80.  If they are  frequently higher than that, we recommend checking a little more often and to follow up with primary care team   - Will defer to primary care team for monitoring of cholesterol panel and blood sugar numbers with target LDL cholesterol of less than 70 and hemoglobin A1c less than 7%  - Recommend following a low salt, mediterranean diet   - Recommend routine physical exercise as tolerated     We will plan for her to return to the office in 9 months time to see on of the APPs or Dr. Albarado but would be happy to see her sooner if the need should arise.  If she has any symptoms concerning for TIA or stroke including sudden painless loss of vision or double vision, difficulty speaking or swallowing, vertigo/room spinning that does not quickly resolve, or weakness/numbness/loss of coordination affecting 1 side of the face or body she should proceed by ambulance to the nearest emergency room immediately.

## 2024-02-16 ENCOUNTER — APPOINTMENT (OUTPATIENT)
Dept: PHYSICAL THERAPY | Facility: CLINIC | Age: 84
End: 2024-02-16
Payer: MEDICARE

## 2024-02-19 ENCOUNTER — OFFICE VISIT (OUTPATIENT)
Dept: PHYSICAL THERAPY | Facility: CLINIC | Age: 84
End: 2024-02-19
Payer: MEDICARE

## 2024-02-19 DIAGNOSIS — Z86.73 HISTORY OF CVA (CEREBROVASCULAR ACCIDENT): Primary | ICD-10-CM

## 2024-02-19 PROCEDURE — 97110 THERAPEUTIC EXERCISES: CPT

## 2024-02-19 PROCEDURE — 97112 NEUROMUSCULAR REEDUCATION: CPT

## 2024-02-19 NOTE — PROGRESS NOTES
PT Discharge Note     Today's date: 2024  Patient name: Ade Moon  : 1940  MRN: [id number]  Referring provider: Deepak Hermosillo MD  Dx:   Encounter Diagnosis     ICD-10-CM    1. History of CVA (cerebrovascular accident)  Z86.73                      Subjective: Ade states that she did well with the exercises at home. She saw her neurologist who agreed that different shoes would be good and is sending her for some tests per her report.      Objective: See treatment diary below    Assessment: Ade tolerated treatment well. She demonstrated improved ease of STS with intermittent correction of R foot placement needed as well as cues for full arc of motion. She had less instances of toe catching on hurdles both leading and trailing compared to previous sessions. Minimal instances of L foot drag was audible on treadmill today. For L foot clearance, she tends to have increased UE flexion synergy as she fatigues. She inquired about shoe brand that may work better for her AFO and informed her that [employer] is a common one used by other patients with AFOs. She will tell her son.     At this point, she has demonstrated a plateau in improvement and has a good understanding of exercises to continue and importance of staying active at home. Given result of peer to peer for insurance authorization and today being last authorized visit, she will be discharged to independent HEP. She was provided with handout for HEP at last session and information flyer for community fitness exercise class hosted within the clinic as another option for her to continually safely exercise. She is in agreement with this plan and knows that if her status changes in a few months, she would be able to return in the future with a new referral.        Goals  STGs in 4 weeks  1. Pt will improve gait speed by at least 0.1 m/s with least restrictive device needed to improve safety with ambulation. - MET (with rollator)  2. Pt will  improve TUG time by at least 5 seconds to decrease fall risk and improve mobility. - MET  3. Pt will improve 5xSTS score by at least 5 seconds needed to reduce fall risk. - MET  4. Pt will improve 6MWT by at least 100 feet with appropriate AD to improve functional mobility and endurance. - MET  5. Pt will be able to complete all bed mobility with no more than close supervision for improved independence. - MET     LTGs in 12 weeks  1. Pt will improve gait speed to at least 0.68 m/s with least restrictive device needed to improve safety with ambulation. - NOT MET  2. Pt will improve TUG score to <18 seconds to decrease fall risk and improve functional mobility. - NOT MET  3. Pt will improve 5xSTS score to <15 sec needed to reduce fall risk. - MET  4. Pt will improve 6MWT to at least 750 feet with appropriate AD to improve functional mobility. - NOT MET  5. Pt will demonstrate independence with HEP within 12 weeks. - MET     New goals as of 2/2/2024 due end of POC (2/29/2024)  Pt will increase her FGA score by 4 points for improved balance (MCID 4 pts). - NOT MET        Plan: Discharge to independent HEP     Short Term Goal Expiration Date:(12/29/2023)  Long Term Goal Expiration Date: (2/29/2024)  POC Expiration Date: (2/29/2024)        POC expires Unit limit Auth Expiration date PT/OT/ST + Visit Limit?   2/29/2024 BOMN 2/16/24 BOMN     2/23/24                        Visit/Unit Tracking  AUTH Status:  Date 1/8 1/12 1/18 1/22 1/26 1/29 2/2 2/5 2/7 2/12 2/19   2 visits Used 12 1  2 3 4 5 6 PN 7 8 2 1   2/12 to 3/8 Remaining  0 7 6 5 4 3 2 1 0 1 0            Precautions HTN, a-fib, pacemaker        Manuals 2/12 2/19 2/2 2/5 2/7                   Pt education   POC, insurance visits remaining--asking for more visits             Neuro Re-Ed         STS Mini squats x20 From chair with BUE use    2x10    Vc for full arc of motion  From chair with BUE     2x10     Vc for full arc of motion From chair with BUE    X10     Vc for  "full arc of motion   HKM //bars (long)  U/l UE use    X3 laps       Side stepping //bars (long)  BUE use    X3 laps Solo 1/4 length  With R SPC    X2 laps  Solo 1/4 length  3# L AW  With R SPC    X3 laps    Hurdles   Solo 1/4 length  With R SPC    4\" hurdles (4)    Fwd x3 laps  Solo 1/4 length  3# L AW  With R SPC    4\" hurdles (4)    Fwd x2 laps    Intermittent min A for L foot clearance Solo 1/4 length  With R SPC    4\" hurdles (4)    Fwd x3 laps (1 lap with L AFO)   Bwd ambu //bars (long)  U/l UE use    X3 laps    Solo 1/4 length  With R SPC    X3 laps   Foam     Solo 1/4 length  R SPC    Across 4 airex pads pushed together    Fwd x3 laps   Step taps   Up to 6\" step  With R SPC    X10 ea LE   Up to 6\" step with R SPC    2x10 ea LE   Weave thru cones     Solo 1/4 length  With R SPC    X4 laps     Resisted ambu        Step ups    Solo 1/4 length  3# L AW  R SPC    Stepping up and over: airex pad, 4\" step, airex pad    Fwd x3 laps    Walk with HT/HN //bars (long)  U/l UE use    X3 laps ea   Solo 1/4 length  3# L AW  R SPC    X3 laps ea    Ther Ex        HEP review                                                                Ther Activity        Bed mobility                Gait Training        TM Solo TM  BUE use    1.2 mph x8 min Solo TM  BUE use    1.2 mph x10 min  Solo TM BUE use    3# L AW    1.2 mph x5 min  1.4 mph x1 min 30 sec    Seated rest due to L LE fatigue    1.4 mph x4 min Solo TM unavailable   AD training     L AFO don    X50 ft with rollator   Modalities                             Access Code: [email or web address]  URL: [email or web address]  Date: 02/12/2024  Prepared by: Ciara Hernandez    Exercises  - Side Stepping with Counter Support  - 1 x daily - 3-4 x weekly - 3 sets - 10 reps  - Standing March with Counter Support  - 1 x daily - 3-4 x weekly - 3 sets - 10 reps  - Backward Walking with Counter Support  - 1 x daily - 3-4 x weekly - 3 sets - 10 reps  - Mini Squat with Counter Support  - " 1 x daily - 3-4 x weekly - 3 sets - 10 reps  - Walking with Head Rotation  - 1 x daily - 3-4 x weekly - 3 sets - 10 reps  - Walking with Head Nod  - 1 x daily - 3-4 x weekly - 3 sets - 10 reps

## 2024-02-21 ENCOUNTER — TELEPHONE (OUTPATIENT)
Dept: SLEEP CENTER | Facility: CLINIC | Age: 84
End: 2024-02-21

## 2024-02-21 NOTE — TELEPHONE ENCOUNTER
Called patient and advised ASV study resulted and ASV ordered.    Patient agreeable to use [employer] as her DME provider.  Advised patient that the ASV Rx will be sent to [facility] today and one of their representatives should reach out in 7-10 days to schedule a set up appointment in the office.      Rx for ASV and clinical information sent to [facility] via [place].     Scheduled compliance follow up 5/6/24.

## 2024-02-22 ENCOUNTER — CLINICAL SUPPORT (OUTPATIENT)
Dept: INTERNAL MEDICINE CLINIC | Facility: CLINIC | Age: 84
End: 2024-02-22

## 2024-02-22 ENCOUNTER — HOSPITAL ENCOUNTER (OUTPATIENT)
Dept: NON INVASIVE DIAGNOSTICS | Facility: CLINIC | Age: 84
Discharge: HOME | End: 2024-02-22
Payer: MEDICARE

## 2024-02-22 VITALS
HEIGHT: 63 IN | HEART RATE: 80 BPM | DIASTOLIC BLOOD PRESSURE: 70 MMHG | WEIGHT: 138 LBS | SYSTOLIC BLOOD PRESSURE: 118 MMHG | BODY MASS INDEX: 24.45 KG/M2

## 2024-02-22 DIAGNOSIS — E53.8 B12 DEFICIENCY: Primary | ICD-10-CM

## 2024-02-22 DIAGNOSIS — I27.20 PULMONARY HYPERTENSION (HCC): ICD-10-CM

## 2024-02-22 LAB
AORTIC VALVE MEAN VELOCITY: 15.3 M/S
APICAL FOUR CHAMBER EJECTION FRACTION: 69 %
ASCENDING AORTA: 3.4 CM
AV AREA BY CONTINUOUS VTI: 1.5 CM2
AV AREA PEAK VELOCITY: 1.3 CM2
AV LVOT MEAN GRADIENT: 2 MMHG
AV LVOT PEAK GRADIENT: 4 MMHG
AV MEAN GRADIENT: 10 MMHG
AV PEAK GRADIENT: 18 MMHG
AV REGURGITATION PRESSURE HALF TIME: 531 MS
AV VALVE AREA: 1.49 CM2
AV VELOCITY RATIO: 0.46
BSA FOR ECHO PROCEDURE: 1.65 M2
DOP CALC AO PEAK VEL: 2.14 M/S
DOP CALC AO VTI: 47.79 CM
DOP CALC LVOT AREA: 2.83 CM2
DOP CALC LVOT CARDIAC INDEX: 2.69 L/MIN/M2
DOP CALC LVOT CARDIAC OUTPUT: 4.43 L/MIN
DOP CALC LVOT DIAMETER: 1.9 CM
DOP CALC LVOT PEAK VEL VTI: 25.06 CM
DOP CALC LVOT PEAK VEL: 0.98 M/S
DOP CALC LVOT STROKE INDEX: 44.2 ML/M2
DOP CALC LVOT STROKE VOLUME: 71.02
INTERVENTRICULAR SEPTUM IN DIASTOLE (PARASTERNAL SHORT AXIS VIEW): 1.5 CM
LEFT ATRIUM VOLUME INDEX (MOD BIPLANE): 78 ML/M2
LEFT VENTRICULAR INTERNAL DIMENSION IN DIASTOLE: 3.4 CM (ref 3.5–6)
LEFT VENTRICULAR POSTERIOR WALL IN END DIASTOLE: 1.5 CM
RA PRESSURE ESTIMATED: 15 MMHG
RIGHT ATRIUM AREA SYSTOLE A4C: 27 CM2
RV PSP: 54 MMHG
SL CV AV DECELERATION TIME RETROGRADE: 1833 MS
SL CV AV PEAK GRADIENT RETROGRADE: 52 MMHG
SL CV LV EF: 65
TR MAX PG: 39 MMHG
TR PEAK VELOCITY: 3.1 M/S
TRICUSPID ANNULAR PLANE SYSTOLIC EXCURSION: 1.2 CM
TRICUSPID VALVE PEAK REGURGITATION VELOCITY: 3.12 M/S

## 2024-02-22 PROCEDURE — 93325 DOPPLER ECHO COLOR FLOW MAPG: CPT

## 2024-02-22 PROCEDURE — 93308 TTE F-UP OR LMTD: CPT

## 2024-02-22 PROCEDURE — 96372 THER/PROPH/DIAG INJ SC/IM: CPT

## 2024-02-22 PROCEDURE — 93321 DOPPLER ECHO F-UP/LMTD STD: CPT | Performed by: INTERNAL MEDICINE

## 2024-02-22 PROCEDURE — 93321 DOPPLER ECHO F-UP/LMTD STD: CPT

## 2024-02-22 PROCEDURE — 93325 DOPPLER ECHO COLOR FLOW MAPG: CPT | Performed by: INTERNAL MEDICINE

## 2024-02-22 PROCEDURE — 93308 TTE F-UP OR LMTD: CPT | Performed by: INTERNAL MEDICINE

## 2024-02-22 RX ADMIN — CYANOCOBALAMIN 1000 MCG: 1000 INJECTION, SOLUTION INTRAMUSCULAR; SUBCUTANEOUS at 11:20

## 2024-02-22 NOTE — PROGRESS NOTES
Patient came into the office for Vit B12 injection. Patient provided serum and 1.0 ml was given in right deltoid.  Patient tolerated well.    NDC- [id number]  Lot- [id number]  Exp- 04/2025    
no

## 2024-02-23 ENCOUNTER — APPOINTMENT (OUTPATIENT)
Dept: PHYSICAL THERAPY | Facility: CLINIC | Age: 84
End: 2024-02-23
Payer: MEDICARE

## 2024-02-23 LAB

## 2024-02-26 ENCOUNTER — APPOINTMENT (OUTPATIENT)
Dept: PHYSICAL THERAPY | Facility: CLINIC | Age: 84
End: 2024-02-26
Payer: MEDICARE

## 2024-02-28 ENCOUNTER — APPOINTMENT (OUTPATIENT)
Dept: PHYSICAL THERAPY | Facility: CLINIC | Age: 84
End: 2024-02-28
Payer: MEDICARE

## 2024-03-04 ENCOUNTER — OFFICE VISIT (OUTPATIENT)
Dept: CARDIOLOGY CLINIC | Facility: CLINIC | Age: 84
End: 2024-03-04
Payer: MEDICARE

## 2024-03-04 VITALS
HEART RATE: 82 BPM | HEIGHT: 63 IN | BODY MASS INDEX: 24.15 KG/M2 | WEIGHT: 136.3 LBS | DIASTOLIC BLOOD PRESSURE: 60 MMHG | SYSTOLIC BLOOD PRESSURE: 116 MMHG | OXYGEN SATURATION: 98 %

## 2024-03-04 DIAGNOSIS — N18.30 STAGE 3 CHRONIC KIDNEY DISEASE, UNSPECIFIED WHETHER STAGE 3A OR 3B CKD (HCC): ICD-10-CM

## 2024-03-04 DIAGNOSIS — I50.30 HEART FAILURE WITH PRESERVED EJECTION FRACTION, UNSPECIFIED HF CHRONICITY (HCC): Primary | ICD-10-CM

## 2024-03-04 DIAGNOSIS — N18.31 STAGE 3A CHRONIC KIDNEY DISEASE (HCC): ICD-10-CM

## 2024-03-04 DIAGNOSIS — I51.7 LVH (LEFT VENTRICULAR HYPERTROPHY): ICD-10-CM

## 2024-03-04 DIAGNOSIS — I27.20 PULMONARY HYPERTENSION (HCC): ICD-10-CM

## 2024-03-04 PROCEDURE — 99214 OFFICE O/P EST MOD 30 MIN: CPT | Performed by: STUDENT IN AN ORGANIZED HEALTH CARE EDUCATION/TRAINING PROGRAM

## 2024-03-04 NOTE — PROGRESS NOTES
"Advanced Heart Failure/Pulmonary Hypertension Outpatient Note - Ade Moon 83 y.o. female MRN: [id number]    @ Encounter: [id number]    Assessment:  83 y.o. female Hx per chart p/w HF fu. I first met patient 8/7/23.    Follows primarily with EP Dr. Canela  AF on AC  HFpEF, RV size/fxn wnl  PYP scan was not suggestive of cardiac amyloidosis  PH. +RVOT notching. Significant group II contribution likely.  2023 echo with suggestion of borderline elevated filling pressures, worse on R. Note the maxTRV is measured overly generously. RVSP was borderline elevated by echo.  Mild-mod AI  Mild AS  Mild MR  CVA with L hemiparesis  Tachybrady syndrome s/p S-PPM, VVI pacemaker. Per chart:  \"She had left sided erosion remotely and lead extracted. Paces up to 76 % since they increased bystolic\"  Nonobstructive CAD. Had LHC for borderline stress test, revealed nonobstructive dz.  HTN  CKD  Anemia  Vit B12 deficient and gets injections      I have reviewed all pertinent patient data including but not limited to:    Lab Results   Component Value Date    CREATININE 1.33 (H) 11/13/2023     Lab Results   Component Value Date    K 4.0 11/13/2023     Lab Results   Component Value Date    HGBA1C 5.4 08/13/2022     Lab Results   Component Value Date    SMW0QLJJZRQT 5.777 (H) 08/30/2023    TSH 1.93 09/14/2022     Lab Results   Component Value Date    LDLCALC 50 08/30/2023     Lab Results   Component Value Date     (H) 08/30/2023      Lab Results   Component Value Date    NTBNP 778 (H) 08/09/2021      Serum immunofixation shows no monoclonal immunoglobulins.   UPEP: no M spike  FLA ratio 2.2 - minimally elevated (normal if has CKD)  Remote RF and anti-CCP both wnl    Our last encounter:  11/1/2023  TODAY'S PLAN:     03/04/24  Warm, near euvolemic  No new cardiac complaints, feels generally well  No overt SOB  BP acceptable  Significant vpacing at prior interrogation    FLA ratio would be abnl if no CKD  She does not have " significant CKD historically  Recent Cr rise  Recheck BMP now  Recheck FLA ratio now    Given workup to date, Lower suspicion but some mixed signals of infiltrative CM  Workup so far points away from aTTR CA  AL less likely given clinical course, though possible and FLA ratio not well explained as of yet  Will check CMR, discussed utility w pt and she wishes to proceed    Doing PT now    Using ASV  Follows sleep med    Cw aldactone 25 qd  Cw lasix 40 qd  cw losartan 100 qd  Tolerating jardiance 10 qd (she is actually taking this); discussed risks/benefits/red flags/when to call us; no overt contraindications    On nebivolol 5 qd and verapamil 240 qd    On AC    On statin    Key info from my prior notes:    Dopplers neg for DVT    HR in 70s now, heavy V pacing burden, minimal room for liberalization it seems  Fu EP  Extensive vpacing - monitor symptoms after diuresis and BP control, recheck echo and consider med change vs biv upgrade pending course (complex in her given age, frailty, past PPM lead extraction issues)    I am meeting patient for the first time today  Warm, mildly vol up on exam  Lasix recently doubled to lasix 20 bid from qd - within past week - making more urine, she feels slightly better now  Her main complaint is fatigue and SOB, sometimes profound, worse over past year since CVA and also after covid vaccine she says  High RV pacing noted  Office and her home SBP consistently in 130-140s, she keeps good log at home    Her symptoms are likely multifactorial    Limited role for RHC at present time  May reconsider pending Tx as above    Her echo and clinical Hx suggests possible infiltrative CM such as cardiac amyloidosis  Of course she certainly has chronic HTN that can explain similar findings  Will pursue CA workup now - AL screen and pyp ordered  We discussed possible additional questions these tests may raise and potential need for more downstream tests if this leads us to confusion    Serum  immunofixation shows no monoclonal immunoglobulins.   UPEP: no M spike  FLA ratio 2.2 - minimally elevated absolute numbers and wnl for CKD  pyp without evidence of cardiac amyloidosis    PPM,  ICD , CRT (if applicable):  Interrogation:  11/1/23:  MDT-SINGLE CHAMBER PPM / NOT MRI CONDITIONAL   [facility] BATTERY VOLTAGE ADEQUATE (8.4 YRS).  70.7% (>40%/VVIR 60) ALL LEAD PARAMETERS WITHIN NORMAL LIMITS. NO SIGNIFICANT HIGH RATE EPISODES. NO PROGRAMMING CHANGES MADE TO DEVICE PARAMETERS. NORMAL DEVICE FUNCTION.     Studies:  I have reviewed all pertinent patient data/labs/imaging where available, including but not limited to the below studies. Selected results may be displayed here but comprehensive listing is omitted for note clarity and can be found in the epic chart.    ECG.    Echo.    Stress.    Cath.    HPI:   83 y.o. female Hx per chart p/w HF fu.  No new CP/SOB/dizziness/palpitations/syncope.  No new fatigue.  No new unintentional weight changes.  No new leg swelling, PND, pillow orthopnea.  No new fevers, chills, cough, nausea, vomiting, diarrhea, dysuria.      Interval History:   As noted in 'plan' section above and prior epic chart notes.    No new CP/SOB/dizziness/palpitations/syncope.  No new fatigue.  No new unintentional weight changes.  No new leg swelling, PND, pillow orthopnea.  No new fevers, chills, cough, nausea, vomiting, diarrhea, dysuria.    Past Medical History:   Diagnosis Date    A-fib (HCC)     Anemia     Arthritis     Breast pain, right     Chest pain on breathing     Colon polyp     Cough     Diverticulosis     Encounter for screening colonoscopy     H/O sick sinus syndrome     Heart murmur     High cholesterol     History of atrial fibrillation     Rate ontrolled. On xarelto 15mg (creatinine 50) Followed by Dr. Randolph with Cardiology     History of weight loss     Report loose fitting clothes. Weight stable (3lbs difference). Last colo showed small  tubular adenoma x2. Breast biopsy last showed fibrocystic changes. TSH wnl. Will check cbc, bmp, spep.        Hx of long term use of blood thinners     Hypertension     Irregular heart beat     RICH (obstructive sleep apnea)     Pacemaker     Preop examination     Shortness of breath     Viral bronchitis      Patient Active Problem List    Diagnosis Date Noted    Central sleep apnea 02/05/2024    Dyspnea 08/07/2023    Foot drop, left foot 03/27/2023    RICH (obstructive sleep apnea)     Pulmonary hypertension (HCC) 11/23/2022    Belching 10/05/2022    Transient neurological symptoms 09/08/2022    Thrush 09/06/2022    At risk for delirium 09/06/2022    Valvular heart disease 08/20/2022    CAD (coronary artery disease) 08/20/2022    Urinary retention 08/20/2022    Neck pain 08/20/2022    Adjustment disorder with mixed emotional features 08/20/2022    Anemia 08/19/2022    At risk for venous thromboembolism (VTE) 08/19/2022    DARRIUS (acute kidney injury) ([facility]) 08/19/2022    Spastic hemiparesis of left dominant side as late effect of cerebral infarction ([facility]) 08/19/2022    History of CVA (cerebrovascular accident) 08/12/2022    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 ([facility]) 08/12/2022    Renal insufficiency     Abnormal nuclear stress test 08/20/2021    Diverticulosis of colon 05/20/2019    Pacemaker 02/26/2018    Tubulovillous adenoma 02/09/2018    Gastroesophageal reflux disease without esophagitis 02/09/2018    Other chest pain 12/08/2017    Essential hypertension 12/08/2017    Hyperlipidemia LDL goal <100 12/08/2017    Stage 3 chronic kidney disease (HCC) 12/08/2017    Osteoarthritis of both wrists 12/08/2017    Chronic atrial fibrillation (HCC) 12/08/2017    Cavus deformity of foot 06/27/2017    Hallux abducto valgus, bilateral 06/27/2017    Lipoma of right upper extremity 05/17/2017    Pain due to onychomycosis of toenails of both feet 01/20/2017    Allergic rhinitis due to pollen 10/12/2015     Midline cystocele 12/23/2014    Osteoarthritis of hip 07/17/2014    B12 deficiency 12/06/2013    Osteopenia 11/15/2013    Left renal artery stenosis (HCC) 08/27/2013    Left atrial enlargement 08/27/2013       ROS:  10 point ROS negative except as specified in HPI/interval history    Allergies   Allergen Reactions    Other Itching     Bandaids    Tape [Medical Tape] Itching       Current Outpatient Medications   Medication Instructions    acetaminophen (TYLENOL) 650 mg, Oral, Every 6 hours PRN    ammonium lactate (LAC-HYDRIN) 12 % cream Topical, As needed    Blood Pressure Monitoring (Blood Pressure Cuff) MISC Does not apply, Every other day, For HTN    cyanocobalamin 1,000 mcg, Intramuscular, Every 30 days    Empagliflozin (JARDIANCE) 10 mg, Oral, Every morning    furosemide (LASIX) 40 mg, Oral, Daily    gabapentin (NEURONTIN) 100 mg, Oral, 3 times daily PRN    Incontinence Supply Disposable (RA Adult Wipes) MISC Does not apply, 4 times daily    losartan (COZAAR) 100 mg, Oral, Daily    melatonin 3 mg, Oral, Every evening    menthol-methyl salicylate (BENGAY) 10-15 % cream Apply externally, 2 times daily    nebivolol (BYSTOLIC) 5 mg, Oral, Daily    nitroglycerin (NITROSTAT) 0.4 mg, Sublingual, Every 5 minutes PRN    pantoprazole (PROTONIX) 40 mg, Oral, Daily (early morning)    Pradaxa 150 MG capsu SB JUAN ANTONIO CAPSULA POR VIA ORAL DOS VECES AL KATRINA    rosuvastatin (CRESTOR) 5 mg, Oral, Daily    spironolactone (ALDACTONE) 25 mg, Oral, Daily    verapamil (CALAN-SR) 240 mg, Oral, Daily at bedtime        Social History     Socioeconomic History    Marital status:      Spouse name: Not on file    Number of children: Not on file    Years of education: Not on file    Highest education level: Not on file   Occupational History    Occupation: retired   Tobacco Use    Smoking status: Never    Smokeless tobacco: Never   Vaping Use    Vaping status: Never Used   Substance and Sexual Activity    Alcohol use: Never    Drug  use: No    Sexual activity: Not Currently     Comment:    Other Topics Concern    Not on file   Social History Narrative    Housing, household, and economic circumstances      Social Determinants of Health     Financial Resource Strain: Low Risk  (8/7/2023)    Overall Financial Resource Strain (CARDIA)     Difficulty of Paying Living Expenses: Not very hard   Food Insecurity: No Food Insecurity (8/7/2023)    Hunger Vital Sign     Worried About Running Out of Food in the Last Year: Never true     Ran Out of Food in the Last Year: Never true   Transportation Needs: No Transportation Needs (8/7/2023)    PRAPARE - Transportation     Lack of Transportation (Medical): No     Lack of Transportation (Non-Medical): No   Physical Activity: Inactive (4/14/2021)    Exercise Vital Sign     Days of Exercise per Week: 0 days     Minutes of Exercise per Session: 0 min   Stress: No Stress Concern Present (4/14/2021)    [employer] [place] [employer] - Occupational Stress Questionnaire     Feeling of Stress : Not at all   Social Connections: Moderately Isolated (4/14/2021)    Social Connection and Isolation Panel [NHANES]     Frequency of Communication with Friends and Family: More than three times a week     Frequency of Social Gatherings with Friends and Family: More than three times a week     Attends Restorationist Services: More than 4 times per year     Active Member of Clubs or Organizations: No     Attends Club or Organization Meetings: Never     Marital Status:    Intimate Partner Violence: Not At Risk (4/14/2021)    Humiliation, Afraid, Rape, and Kick questionnaire     Fear of Current or Ex-Partner: No     Emotionally Abused: No     Physically Abused: No     Sexually Abused: No   Housing Stability: Unknown (8/13/2022)    Housing Stability Vital Sign     Unable to Pay for Housing in the Last Year: No     Number of Places Lived in the Last Year: Not on file     Unstable Housing in the Last Year: No  "      Family History   Problem Relation Age of Onset    Diabetes Mother     Breast cancer Sister 53    No Known Problems Father     No Known Problems Maternal Grandmother     No Known Problems Maternal Grandfather     No Known Problems Paternal Grandmother     No Known Problems Paternal Grandfather     No Known Problems Daughter     No Known Problems Son     No Known Problems Sister     No Known Problems Sister     No Known Problems Brother     No Known Problems Brother     No Known Problems Sister     No Known Problems Paternal Aunt     No Known Problems Paternal Aunt     No Known Problems Paternal Aunt     No Known Problems Paternal Aunt        Physical Exam:  Vitals:    03/04/24 1459   BP: 116/60   BP Location: Left arm   Patient Position: Sitting   Cuff Size: Standard   Pulse: 82   SpO2: 98%   Weight: 61.8 kg (136 lb 4.8 oz)   Height: 5' 3\" (1.6 m)     Constitutional: NAD, non toxic  Ears/nose/mouth/throat: atraumatic  CV: RRR, no JVD  Resp: ctabl  GI: Soft, NTND  MSK: no swollen joints in exposed areas  Extr: +1 LE edema L>R (since CVA), warm LE  Pysche: Normal affect  Neuro: appropriate in conversation  Skin: dry and intact in exposed areas    Labs & Results:    Lab Results   Component Value Date    SODIUM 139 11/13/2023    K 4.0 11/13/2023     11/13/2023    CO2 28 11/13/2023    BUN 29 (H) 11/13/2023    CREATININE 1.33 (H) 11/13/2023    GLUC 88 09/20/2022    CALCIUM 9.5 11/13/2023     Lab Results   Component Value Date    WBC 6.74 08/30/2023    HGB 11.3 (L) 08/30/2023    HCT 36.4 08/30/2023    MCV 98 08/30/2023     08/30/2023     Lab Results   Component Value Date     (H) 08/30/2023      Lab Results   Component Value Date    CHOLESTEROL 118 08/30/2023    CHOLESTEROL 100 08/13/2022    CHOLESTEROL 149 08/16/2021     Lab Results   Component Value Date    HDL 52 08/30/2023    HDL 48 (L) 08/13/2022    HDL 62 08/16/2021     Lab Results   Component Value Date    TRIG 78 08/30/2023    TRIG 71 " 08/13/2022    TRIG 101 08/16/2021     Lab Results   Component Value Date    NONHDLC 52 08/13/2022    NONHDLC 87 08/16/2021    NONHDLC 89 08/18/2020       Counseling / Coordination of Care  Time spent today 27 minutes.  Greater than 50% of total time was spent with the patient and / or family counseling and / or coordination of care.  We discussed diagnoses, most recent studies, tests and any changes in treatment plan.    Thank you for the opportunity to participate in the care of this patient.    Shayne Herrmann MD  Attending Physician  Advanced Heart Failure and Transplant Cardiology  [facility]

## 2024-03-06 ENCOUNTER — TELEPHONE (OUTPATIENT)
Dept: INTERNAL MEDICINE CLINIC | Facility: CLINIC | Age: 84
End: 2024-03-06

## 2024-03-06 ENCOUNTER — TELEPHONE (OUTPATIENT)
Dept: CARDIOLOGY CLINIC | Facility: CLINIC | Age: 84
End: 2024-03-06

## 2024-03-06 NOTE — TELEPHONE ENCOUNTER
Folder Color- Red    Name of Form- [facility] Prescription     Form to be filled out by- Dr. Hermosillo     Form to be Faxed [phone]    Patient made aware of 10 business day policy.

## 2024-03-06 NOTE — TELEPHONE ENCOUNTER
----- Message from Alexandra Rivera sent at 3/6/2024  3:41 PM EST -----  Regarding: FW: Please notify pt taht after checking, her pacemaker is not MRI safe... so we will hold off on the MRI and discuss at next visit. No urgency    ----- Message -----  From: Shayne Herrmann MD  Sent: 3/6/2024  12:37 PM EST  To: Alexandra Rivera  Subject: Please notify pt taht after checking, her pa#    Thanks!    - Shayne

## 2024-03-13 ENCOUNTER — OFFICE VISIT (OUTPATIENT)
Dept: INTERNAL MEDICINE CLINIC | Facility: CLINIC | Age: 84
End: 2024-03-13

## 2024-03-13 VITALS
HEART RATE: 83 BPM | TEMPERATURE: 98.1 F | HEIGHT: 63 IN | DIASTOLIC BLOOD PRESSURE: 77 MMHG | SYSTOLIC BLOOD PRESSURE: 128 MMHG | WEIGHT: 142 LBS | BODY MASS INDEX: 25.16 KG/M2

## 2024-03-13 DIAGNOSIS — I10 ESSENTIAL HYPERTENSION: ICD-10-CM

## 2024-03-13 DIAGNOSIS — K21.9 GASTROESOPHAGEAL REFLUX DISEASE WITHOUT ESOPHAGITIS: Chronic | ICD-10-CM

## 2024-03-13 DIAGNOSIS — E53.8 B12 DEFICIENCY: Chronic | ICD-10-CM

## 2024-03-13 DIAGNOSIS — M85.80 OSTEOPENIA AFTER MENOPAUSE: ICD-10-CM

## 2024-03-13 DIAGNOSIS — Z91.81 AT HIGH RISK FOR FALLS: ICD-10-CM

## 2024-03-13 DIAGNOSIS — G47.33 OSA (OBSTRUCTIVE SLEEP APNEA): ICD-10-CM

## 2024-03-13 DIAGNOSIS — Z78.0 OSTEOPENIA AFTER MENOPAUSE: ICD-10-CM

## 2024-03-13 DIAGNOSIS — Z00.00 ROUTINE ADULT HEALTH MAINTENANCE: Primary | ICD-10-CM

## 2024-03-13 PROBLEM — I50.30 (HFPEF) HEART FAILURE WITH PRESERVED EJECTION FRACTION (HCC): Status: ACTIVE | Noted: 2024-03-13

## 2024-03-13 RX ORDER — CYANOCOBALAMIN 1000 UG/ML
1000 INJECTION, SOLUTION INTRAMUSCULAR; SUBCUTANEOUS
Qty: 3 ML | Refills: 0 | Status: SHIPPED | OUTPATIENT
Start: 2024-03-13 | End: 2024-06-11

## 2024-03-13 RX ORDER — LOSARTAN POTASSIUM 50 MG/1
50 TABLET ORAL 2 TIMES DAILY
Qty: 180 TABLET | Refills: 5 | Status: SHIPPED | OUTPATIENT
Start: 2024-03-13 | End: 2025-09-04

## 2024-03-13 NOTE — ASSESSMENT & PLAN NOTE
Currently follows with HF. Considering infiltrative disease and CMR ordered but patient does have device.  -Son is coordinating with cardiology on CMR/device management  -Euvolemic on exam today

## 2024-03-13 NOTE — ASSESSMENT & PLAN NOTE
Patient on multiple HTN medications. Currently undergoing sleep study machine titration. Due to the number of HTN medications-will screen for JACQUELINE    -Renal artery doppler ordered  -Continue to follow sleep medicine

## 2024-03-13 NOTE — PROGRESS NOTES
[facility]  INTERNAL MEDICINE OFFICE VISIT     PATIENT INFORMATION     Ade Moon   83 y.o. female   MRN: [id number]    ASSESSMENT/PLAN     Problem List Items Addressed This Visit       Essential hypertension     Patient on multiple HTN medications. Currently undergoing sleep study machine titration. Due to the number of HTN medications-will screen for JACQUELINE    -Renal artery doppler ordered  -Continue to follow sleep medicine         Relevant Medications    losartan (COZAAR) 50 mg tablet    Other Relevant Orders    VAS renal artery complete    Gastroesophageal reflux disease without esophagitis (Chronic)     Continue Protonix         RICH (obstructive sleep apnea)     Underwent Sleep study showing mixed RICH/central. Has follow up in May for           At high risk for falls    Relevant Orders    DXA bone density spine hip and pelvis     Other Visit Diagnoses       Routine adult health maintenance    -  Primary    Relevant Orders    Zoster Vaccine Recombinant IM    Osteopenia after menopause              Schedule a follow-up appointment in 3 months.    HEALTH MAINTENANCE     Immunization History   Administered Date(s) Administered    COVID-19 MODERNA VACC 0.5 ML IM 02/19/2021, 05/27/2021    COVID-19 Pfizer Vac BIVALENT Jermaine-sucrose 12 Yr+ IM 09/09/2022    INFLUENZA 10/11/2005, 10/24/2006, 10/15/2007, 09/28/2015, 10/06/2016, 10/18/2017, 10/05/2022    Influenza Quadrivalent Preservative Free 3 years and older IM 10/06/2016    Influenza Quadrivalent, 6-35 Months IM 10/18/2017    Influenza, high dose seasonal 0.7 mL 10/12/2018, 09/23/2019, 09/23/2020, 09/22/2021, 10/05/2022, 10/19/2023    Influenza, seasonal, injectable 09/24/2013, 09/24/2014, 09/28/2015    Pneumococcal Conjugate 13-Valent 05/31/2016    Pneumococcal Polysaccharide PPV23 01/01/2001, 12/18/2019    Tdap 03/12/2014     CHIEF COMPLAINT     Chief Complaint   Patient presents with    Follow-up     gas     "  HISTORY OF PRESENT ILLNESS     Patient is an 83-year-old with extensive past medical history including hypertension, hyperlipidemia, CKD 3, A-fib, CVA, CAD, RICH, pulmonary hypertension, tachybradycardia syndrome (pacemaker placed), heart failure preserved ejection fraction.  Patient is presenting today for management of chronic conditions.  Patient states she does have some indigestion which is chronic for her.  Patient does have Protonix ordered.  Patient has no other complaints at this time.  Patient is actively following up with heart failure where she is scheduled for a CMR.  Additionally patient is being followed with sleep medicine due to RICH/central sleep apnea.  Patient has follow-up appointment in May with them to discuss machine settings.  Patient has no other complaints at this time.    REVIEW OF SYSTEMS     Review of Systems   Constitutional:  Negative for fatigue.   Respiratory:  Negative for shortness of breath.    Cardiovascular:  Negative for chest pain, palpitations and leg swelling.   Gastrointestinal:  Negative for diarrhea, nausea and vomiting.   Genitourinary: Negative.    Musculoskeletal: Negative.    Neurological:  Negative for weakness and headaches.   Psychiatric/Behavioral: Negative.       OBJECTIVE     Vitals:    03/13/24 0913   BP: 128/77   BP Location: Left arm   Patient Position: Sitting   Cuff Size: Large   Pulse: 83   Temp: 98.1 °F (36.7 °C)   TempSrc: Temporal   Weight: 64.4 kg (142 lb)   Height: 5' 3\" (1.6 m)     Physical Exam  Vitals reviewed.   Constitutional:       Appearance: Normal appearance.   HENT:      Head: Normocephalic and atraumatic.      Mouth/Throat:      Mouth: Mucous membranes are moist.      Pharynx: Oropharynx is clear.   Cardiovascular:      Rate and Rhythm: Normal rate and regular rhythm.   Pulmonary:      Effort: Pulmonary effort is normal.      Breath sounds: Normal breath sounds.   Abdominal:      General: Abdomen is flat. Bowel sounds are normal. "   Musculoskeletal:      Right lower leg: No edema.      Left lower leg: Edema present.   Neurological:      Mental Status: She is alert and oriented to person, place, and time.   Psychiatric:         Mood and Affect: Mood normal.         Behavior: Behavior normal.       CURRENT MEDICATIONS     Current Outpatient Medications:     losartan (COZAAR) 50 mg tablet, Take 1 tablet (50 mg total) by mouth 2 (two) times a day, Disp: 180 tablet, Rfl: 5    acetaminophen (TYLENOL) 325 mg tablet, Take 2 tablets (650 mg total) by mouth every 6 (six) hours as needed for mild pain (Patient not taking: Reported on 8/15/2023), Disp: , Rfl: 0    ammonium lactate (LAC-HYDRIN) 12 % cream, APPLY TOPICALLY AS NEEDED FOR DRY SKIN, Disp: 385 g, Rfl: 1    Blood Pressure Monitoring (Blood Pressure Cuff) MISC, Use every other day For HTN (Patient not taking: Reported on 8/15/2023), Disp: 1 each, Rfl: 0    cyanocobalamin 1,000 mcg/mL, INJECT 1 ML (1,000 MCG TOTAL) INTO A MUSCLE EVERY 30 (THIRTY) DAYS, Disp: 3 mL, Rfl: 0    Empagliflozin (Jardiance) 10 MG TABS tablet, Take 1 tablet (10 mg total) by mouth every morning (Patient not taking: Reported on 2/15/2024), Disp: 90 tablet, Rfl: 5    furosemide (LASIX) 20 mg tablet, Take 2 tablets (40 mg total) by mouth daily, Disp: 180 tablet, Rfl: 5    gabapentin (Neurontin) 100 mg capsule, Take 1 capsule (100 mg total) by mouth 3 (three) times a day as needed (as needed for left foot pain), Disp: 90 capsule, Rfl: 1    Incontinence Supply Disposable (RA Adult Wipes) MISC, Use 4 (four) times a day, Disp: 64 each, Rfl: 10    melatonin 3 mg, TAKE 1 TABLET (3 MG TOTAL) BY MOUTH EVERY EVENING (Patient not taking: Reported on 11/1/2023), Disp: 90 tablet, Rfl: 3    menthol-methyl salicylate (BENGAY) 10-15 % cream, Apply topically 2 (two) times a day (Patient not taking: Reported on 11/22/2023), Disp: , Rfl: 0    nebivolol (BYSTOLIC) 5 mg tablet, TAKE 1 TABLET (5 MG TOTAL) BY MOUTH DAILY, Disp: 90 tablet, Rfl: 3     nitroglycerin (NITROSTAT) 0.4 mg SL tablet, Place 1 tablet (0.4 mg total) under the tongue every 5 (five) minutes as needed for chest pain (Patient not taking: Reported on 8/3/2023), Disp: , Rfl: 0    pantoprazole (PROTONIX) 40 mg tablet, TAKE 1 TABLET (40 MG TOTAL) BY MOUTH DAILY IN THE EARLY MORNING, Disp: 90 tablet, Rfl: 3    Pradaxa 150 MG capsu, SB JUAN ANTONIO CAPSULA POR VIA ORAL DOS VECES AL KATRINA, Disp: 180 capsule, Rfl: 3    rosuvastatin (CRESTOR) 5 mg tablet, TAKE 1 TABLET (5 MG TOTAL) BY MOUTH DAILY, Disp: 90 tablet, Rfl: 3    spironolactone (ALDACTONE) 25 mg tablet, Take 1 tablet (25 mg total) by mouth daily, Disp: 90 tablet, Rfl: 5    verapamil (CALAN-SR) 240 mg CR tablet, TAKE 1 TABLET (240 MG TOTAL) BY MOUTH DAILY AT BEDTIME, Disp: 90 tablet, Rfl: 3    Current Facility-Administered Medications:     cyanocobalamin injection 1,000 mcg, 1,000 mcg, Intramuscular, Q30 Days, Deepak Hermosillo MD, 1,000 mcg at 02/22/24 1120    PAST MEDICAL & SURGICAL HISTORY     Past Medical History:   Diagnosis Date    A-fib (HCC)     Anemia     Arthritis     Breast pain, right     Chest pain on breathing     Colon polyp     Cough     Diverticulosis     Encounter for screening colonoscopy     H/O sick sinus syndrome     Heart murmur     High cholesterol     History of atrial fibrillation     Rate ontrolled. On xarelto 15mg (creatinine 50) Followed by Dr. Randolph with Cardiology     History of weight loss     Report loose fitting clothes. Weight stable (3lbs difference). Last colo showed small tubular adenoma x2. Breast biopsy last showed fibrocystic changes. TSH wnl. Will check cbc, bmp, spep.        Hx of long term use of blood thinners     Hypertension     Irregular heart beat     RICH (obstructive sleep apnea)     Pacemaker     Preop examination     Shortness of breath     Viral bronchitis      Past Surgical History:   Procedure Laterality Date    BREAST BIOPSY Right 08/17/2006    ultrasound guided Percutaneous Needle Core -Benign     CATARACT EXTRACTION      CATARACT EXTRACTION W/ INTRAOCULAR LENS  IMPLANT, BILATERAL      COLONOSCOPY      COLONOSCOPY N/A 05/22/2018    Procedure: COLONOSCOPY;  Surgeon: Jenn Jang DO;  [facility];  Service: Gastroenterology    INSERT / REPLACE / REMOVE PACEMAKER      LEG SURGERY Right     as a child after a fall    MAMMO STEREOTACTIC BREAST BIOPSY RIGHT (ALL INC) Right 10/2014    benign    PA CMBND ANTERPOST COLPORRAPHY W/CYSTO N/A 03/17/2016    Procedure: COLPORRHAPHY ANTERIOR POSTERIOR ;  Surgeon: Dario Braden MD;  [facility];  Service: Gynecology    PA COLONOSCOPY FLX DX W/COLLJ SPEC WHEN PFRMD N/A 04/04/2019    Procedure: COLONOSCOPY;  Surgeon: Jenn Jang DO;  [facility];  Service: Gastroenterology    PA COLPOPEXY VAGINAL EXTRAPERITONEAL APPROACH N/A 03/17/2016    Procedure: COLPOPEXY VAGINAL EXTRAPERITONEAL (VEC) ANTERIOR ;  Surgeon: Dario Braden MD;  [facility];  Service: Gynecology    PA CYSTOURETHROSCOPY N/A 03/17/2016    Procedure: CYSTOSCOPY;  Surgeon: Dario Braden MD;  [facility];  Service: Gynecology    PA ESOPHAGOGASTRODUODENOSCOPY TRANSORAL DIAGNOSTIC N/A 04/16/2018    Procedure: EGD AND COLONOSCOPY;  Surgeon: Jenn Jang DO;  [facility];  Service: Gastroenterology    PA LAPAROSCOPY COLECTOMY PARTIAL W/ANASTOMOSIS Right 01/13/2022    Procedure: Diagnostic laparoscopy, laparscopic right colectomy;  Surgeon: Andriy Guevara MD;  [facility];  Service: Colorectal    PA SLING OPERATION STRESS INCONTINENCE N/A 03/17/2016    Procedure: INSERTION PUBOVAGINAL SLING SINGLE INCISION ;  Surgeon: Dario Braden MD;  [facility];  Service: Gynecology     SOCIAL & FAMILY HISTORY     Social History     Socioeconomic History    Marital status:      Spouse name: Not on file    Number of children: Not on file    Years of education: Not on file    Highest education level: Not on  file   Occupational History    Occupation: retired   Tobacco Use    Smoking status: Never    Smokeless tobacco: Never   Vaping Use    Vaping status: Never Used   Substance and Sexual Activity    Alcohol use: Never    Drug use: No    Sexual activity: Not Currently     Comment:    Other Topics Concern    Not on file   Social History Narrative    Housing, household, and economic circumstances      Social Determinants of Health     Financial Resource Strain: Low Risk  (3/13/2024)    Overall Financial Resource Strain (CARDIA)     Difficulty of Paying Living Expenses: Not hard at all   Food Insecurity: No Food Insecurity (3/13/2024)    Hunger Vital Sign     Worried About Running Out of Food in the Last Year: Never true     Ran Out of Food in the Last Year: Never true   Transportation Needs: No Transportation Needs (3/13/2024)    PRAPARE - Transportation     Lack of Transportation (Medical): No     Lack of Transportation (Non-Medical): No   Physical Activity: Inactive (4/14/2021)    Exercise Vital Sign     Days of Exercise per Week: 0 days     Minutes of Exercise per Session: 0 min   Stress: No Stress Concern Present (4/14/2021)    [employer] [place] [employer] - Occupational Stress Questionnaire     Feeling of Stress : Not at all   Social Connections: Moderately Isolated (4/14/2021)    Social Connection and Isolation Panel [NHANES]     Frequency of Communication with Friends and Family: More than three times a week     Frequency of Social Gatherings with Friends and Family: More than three times a week     Attends Sikhism Services: More than 4 times per year     Active Member of Clubs or Organizations: No     Attends Club or Organization Meetings: Never     Marital Status:    Intimate Partner Violence: Not At Risk (4/14/2021)    Humiliation, Afraid, Rape, and Kick questionnaire     Fear of Current or Ex-Partner: No     Emotionally Abused: No     Physically Abused: No     Sexually Abused: No    Housing Stability: Low Risk  (3/13/2024)    Housing Stability Vital Sign     Unable to Pay for Housing in the Last Year: No     Number of Places Lived in the Last Year: 1     Unstable Housing in the Last Year: No     Social History     Substance and Sexual Activity   Alcohol Use Never       Social History     Substance and Sexual Activity   Drug Use No     Social History     Tobacco Use   Smoking Status Never   Smokeless Tobacco Never     Family History   Problem Relation Age of Onset    Diabetes Mother     Breast cancer Sister 53    No Known Problems Father     No Known Problems Maternal Grandmother     No Known Problems Maternal Grandfather     No Known Problems Paternal Grandmother     No Known Problems Paternal Grandfather     No Known Problems Daughter     No Known Problems Son     No Known Problems Sister     No Known Problems Sister     No Known Problems Brother     No Known Problems Brother     No Known Problems Sister     No Known Problems Paternal Aunt     No Known Problems Paternal Aunt     No Known Problems Paternal Aunt     No Known Problems Paternal Aunt      ==  Nolberto Hermosillo MD  PGY-2  [facility]    [address], [address]  [place] [address]  Office: [phone]  Fax: [phone]

## 2024-03-14 ENCOUNTER — HOSPITAL ENCOUNTER (OUTPATIENT)
Dept: NON INVASIVE DIAGNOSTICS | Facility: CLINIC | Age: 84
Discharge: HOME | End: 2024-03-14
Payer: MEDICARE

## 2024-03-14 DIAGNOSIS — I77.9 BILATERAL CAROTID ARTERY DISEASE (HCC): ICD-10-CM

## 2024-03-14 DIAGNOSIS — Z86.73 HISTORY OF CVA (CEREBROVASCULAR ACCIDENT): ICD-10-CM

## 2024-03-14 PROCEDURE — 93880 EXTRACRANIAL BILAT STUDY: CPT

## 2024-03-15 DIAGNOSIS — L85.3 XEROSIS CUTIS: ICD-10-CM

## 2024-03-15 PROCEDURE — 93880 EXTRACRANIAL BILAT STUDY: CPT | Performed by: SURGERY

## 2024-03-15 RX ORDER — AMMONIUM LACTATE 12 G/100G
CREAM TOPICAL AS NEEDED
Qty: 385 G | Refills: 3 | Status: SHIPPED | OUTPATIENT
Start: 2024-03-15

## 2024-03-21 ENCOUNTER — CLINICAL SUPPORT (OUTPATIENT)
Dept: INTERNAL MEDICINE CLINIC | Facility: CLINIC | Age: 84
End: 2024-03-21

## 2024-03-21 DIAGNOSIS — E53.8 B12 DEFICIENCY: Primary | Chronic | ICD-10-CM

## 2024-03-21 PROCEDURE — 96372 THER/PROPH/DIAG INJ SC/IM: CPT

## 2024-03-21 RX ADMIN — CYANOCOBALAMIN 1000 MCG: 1000 INJECTION, SOLUTION INTRAMUSCULAR; SUBCUTANEOUS at 11:28

## 2024-03-21 NOTE — PROGRESS NOTES
Patient is here today 03/21/24 for her monthly B12 injection. Injected 1mL into the right deltoid. Patient tolerated well and made aware to return in 1 month for her next injection.    NDC [id number]

## 2024-04-08 ENCOUNTER — OFFICE VISIT (OUTPATIENT)
Dept: MULTI SPECIALTY CLINIC | Facility: CLINIC | Age: 84
End: 2024-04-08

## 2024-04-08 VITALS
HEART RATE: 89 BPM | DIASTOLIC BLOOD PRESSURE: 63 MMHG | RESPIRATION RATE: 18 BRPM | OXYGEN SATURATION: 99 % | BODY MASS INDEX: 24.48 KG/M2 | TEMPERATURE: 97.5 F | WEIGHT: 138.19 LBS | HEIGHT: 63 IN | SYSTOLIC BLOOD PRESSURE: 138 MMHG

## 2024-04-08 DIAGNOSIS — M79.674 PAIN DUE TO ONYCHOMYCOSIS OF TOENAILS OF BOTH FEET: ICD-10-CM

## 2024-04-08 DIAGNOSIS — M79.675 PAIN DUE TO ONYCHOMYCOSIS OF TOENAILS OF BOTH FEET: ICD-10-CM

## 2024-04-08 DIAGNOSIS — M79.672 LEFT FOOT PAIN: ICD-10-CM

## 2024-04-08 DIAGNOSIS — B35.1 ONYCHOMYCOSIS: Primary | ICD-10-CM

## 2024-04-08 DIAGNOSIS — Z86.73 HISTORY OF CVA (CEREBROVASCULAR ACCIDENT): ICD-10-CM

## 2024-04-08 DIAGNOSIS — B35.1 PAIN DUE TO ONYCHOMYCOSIS OF TOENAILS OF BOTH FEET: ICD-10-CM

## 2024-04-08 DIAGNOSIS — M21.372 FOOT DROP, LEFT FOOT: ICD-10-CM

## 2024-04-08 RX ORDER — GABAPENTIN 100 MG/1
100 CAPSULE ORAL 3 TIMES DAILY PRN
Qty: 90 CAPSULE | Refills: 1 | Status: SHIPPED | OUTPATIENT
Start: 2024-04-08

## 2024-04-08 NOTE — PROGRESS NOTES
[facility]iat[facility]  Ade Moon 83 y.o. female MRN: [id number]  Encounter: [id number]      Assessment/Plan        Diagnoses and all orders for this visit:    Onychomycosis  -     Nail debridement    Foot drop, left foot    Pain due to onychomycosis of toenails of both feet       Plan:  Patient was seen/examined. All questions and concerns addressed  Mycotic nails x10 debrided in length and thickness using large nail nipper without incident.   Recommend continued use of AFO brace for lower extremity drop foot.   Encourage daily foot exams at home. Discussed importance of strict glycemia control.   RTC in 3 month(s)    Dr. Alvarez  was present during this entire procedure.         Nail debridement     Date/Time  4/8/2024 2:30 PM     Performed by  Terrance Garcia DPM   Authorized by  Terrance Garcia DPM     Universal Protocol   Consent: Verbal consent obtained.  Consent given by: patient     Procedure Details   Procedure Notes: Mycotic nails x10 debrided in length and thickness using large nail nipper.     Patient tolerance: patient tolerated the procedure well with no immediate complications              History of Present Illness     HPI:       Review of Systems   Constitutional: Negative.    HENT: Negative.    Eyes: Negative.    Respiratory: Negative.    Cardiovascular: Negative.    Gastrointestinal: Negative.    Musculoskeletal: negative  Skin: Fungal toenails.   Neurological: Negative.       Historical Information   Past Medical History:   Diagnosis Date    A-fib (HCC)     Anemia     Arthritis     Breast pain, right     Chest pain on breathing     Colon polyp     Cough     Diverticulosis     Encounter for screening colonoscopy     H/O sick sinus syndrome     Heart murmur     High cholesterol     History of atrial fibrillation     Rate ontrolled. On xarelto 15mg (creatinine 50) Followed by Dr. Randolph with Cardiology     History of weight loss     Report loose fitting clothes. Weight stable (3lbs  difference). Last colo showed small tubular adenoma x2. Breast biopsy last showed fibrocystic changes. TSH wnl. Will check cbc, bmp, spep.        Hx of long term use of blood thinners     Hypertension     Irregular heart beat     RICH (obstructive sleep apnea)     Pacemaker     Preop examination     Shortness of breath     Viral bronchitis      Past Surgical History:   Procedure Laterality Date    BREAST BIOPSY Right 08/17/2006    ultrasound guided Percutaneous Needle Core -Benign    CATARACT EXTRACTION      CATARACT EXTRACTION W/ INTRAOCULAR LENS  IMPLANT, BILATERAL      COLONOSCOPY      COLONOSCOPY N/A 05/22/2018    Procedure: COLONOSCOPY;  Surgeon: Jenn Jang DO;  [facility];  Service: Gastroenterology    INSERT / REPLACE / REMOVE PACEMAKER      LEG SURGERY Right     as a child after a fall    MAMMO STEREOTACTIC BREAST BIOPSY RIGHT (ALL INC) Right 10/2014    benign    CO CMBND ANTERPOST COLPORRAPHY W/CYSTO N/A 03/17/2016    Procedure: COLPORRHAPHY ANTERIOR POSTERIOR ;  Surgeon: Dario Braden MD;  [facility];  Service: Gynecology    CO COLONOSCOPY FLX DX W/COLLJ SPEC WHEN PFRMD N/A 04/04/2019    Procedure: COLONOSCOPY;  Surgeon: Jenn Jang DO;  [facility];  Service: Gastroenterology    CO COLPOPEXY VAGINAL EXTRAPERITONEAL APPROACH N/A 03/17/2016    Procedure: COLPOPEXY VAGINAL EXTRAPERITONEAL (VEC) ANTERIOR ;  Surgeon: Dario Braden MD;  [facility];  Service: Gynecology    CO CYSTOURETHROSCOPY N/A 03/17/2016    Procedure: CYSTOSCOPY;  Surgeon: Dario Braden MD;  [facility];  Service: Gynecology    CO ESOPHAGOGASTRODUODENOSCOPY TRANSORAL DIAGNOSTIC N/A 04/16/2018    Procedure: EGD AND COLONOSCOPY;  Surgeon: Jenn Jang DO;  [facility];  Service: Gastroenterology    CO LAPAROSCOPY COLECTOMY PARTIAL W/ANASTOMOSIS Right 01/13/2022    Procedure: Diagnostic laparoscopy, laparscopic right colectomy;  Surgeon: Andriy  "MD Ismael;  [facility];  Service: Colorectal    MT SLING OPERATION STRESS INCONTINENCE N/A 03/17/2016    Procedure: INSERTION PUBOVAGINAL SLING SINGLE INCISION ;  Surgeon: Dario Braden MD;  [facility];  Service: Gynecology     Social History   Social History     Substance and Sexual Activity   Alcohol Use Never     Social History     Substance and Sexual Activity   Drug Use No     Social History     Tobacco Use   Smoking Status Never   Smokeless Tobacco Never     Family History:   Family History   Problem Relation Age of Onset    Diabetes Mother     Breast cancer Sister 53    No Known Problems Father     No Known Problems Maternal Grandmother     No Known Problems Maternal Grandfather     No Known Problems Paternal Grandmother     No Known Problems Paternal Grandfather     No Known Problems Daughter     No Known Problems Son     No Known Problems Sister     No Known Problems Sister     No Known Problems Brother     No Known Problems Brother     No Known Problems Sister     No Known Problems Paternal Aunt     No Known Problems Paternal Aunt     No Known Problems Paternal Aunt     No Known Problems Paternal Aunt        Meds/Allergies   (Not in a hospital admission)    Allergies   Allergen Reactions    Other Itching     Bandaids    Tape [Medical Tape] Itching       Objective     Current Vitals:   Blood Pressure: 138/63 (04/08/24 1424)  Pulse: 89 (04/08/24 1424)  Temperature: 97.5 °F (36.4 °C) (04/08/24 1424)  Temp Source: Temporal (04/08/24 1424)  Respirations: 18 (04/08/24 1424)  Height: 5' 3\" (160 cm) (04/08/24 1424)  Weight - Scale: 62.7 kg (138 lb 3 oz) (04/08/24 1424)  SpO2: 99 % (04/08/24 1424)        /63   Pulse 89   Temp 97.5 °F (36.4 °C) (Temporal)   Resp 18   Ht 5' 3\" (1.6 m)   Wt 62.7 kg (138 lb 3 oz)   SpO2 99%   BMI 24.48 kg/m²       Lower Extremity Exam:    Musculoskeletal:  MMT is 4/5 to all compartments to RLE, MMT is 3/5 to left anterior compartment, MMT is " 4/5 to left lateral and posterior compartment. Hallux ROM is < 65 degrees B/L, Ankle dorsiflexion < 10 degrees from neutral with leg extended B/L, no pain or guarding during passive joint ROM. Active ROM to the digits intact. Noted foot drop to LLE with hammertoe deformity of all left lesser digits      Cardiovascular:  Right DP pulse 2/4, Right PT pulse 2/4, Left DP pulse 2/4, Left PT pulse 2/4, +0/4 edema B/L, CFT< 3 sec to digits. Pedal hair is Absent. Skin temperature warm to warm B/L. No calf tenderness.      Dermatological:  No open Lesions. Skin of the LE is normal temperature, texture, turgor. Elongated, thickened, discolored toenails x 10         Neurological:  AAOx3. Gross sensation is intact. Monofilament sensation is diminished. Protective sensation is Intact.

## 2024-04-15 ENCOUNTER — TELEPHONE (OUTPATIENT)
Dept: INTERNAL MEDICINE CLINIC | Facility: CLINIC | Age: 84
End: 2024-04-15

## 2024-04-15 NOTE — TELEPHONE ENCOUNTER
Folder Color: Red     Name of Form: [facility]     Form to be filled out by: Dr Hermosillo     Form to be Faxed: (fax number), mailed (address), or picked up (by whom)     Patient made aware of 10 day business policy.

## 2024-04-19 NOTE — PROGRESS NOTES
"Advanced Heart Failure/Pulmonary Hypertension Outpatient Note - Ade Moon 83 y.o. female MRN: [id number]    @ Encounter: [id number]    Assessment:  83 y.o. female Hx per chart p/w HF fu.  I first met patient 8/7/23.    Follows primarily with EP Dr. Canela  AF on AC  HFpEF, RV size/fxn wnl  PYP scan was not suggestive of cardiac amyloidosis. Perugini 0.  Cannot get CMR 2/2 her PPM model.  Mild AS, mild AI  PH. +RVOT notching. Significant group II contribution likely.  2023 echo with suggestion of borderline elevated filling pressures, worse on R. Note the maxTRV is measured overly generously. RVSP was borderline elevated by echo.  CVA with L hemiparesis  Tachybrady syndrome s/p S-PPM, VVI pacemaker. Per chart:  \"She had left sided erosion remotely and lead extracted. Paces up to 76 % since they increased bystolic\"  Nonobstructive CAD. Had LHC for borderline stress test, revealed nonobstructive dz.  HTN  CKD  Anemia  Vit B12 deficient and gets injections      I have reviewed all pertinent patient data including but not limited to:    Lab Results   Component Value Date    CREATININE 1.78 (H) 04/23/2024     Lab Results   Component Value Date    K 4.4 04/23/2024     Lab Results   Component Value Date    HGBA1C 5.4 08/13/2022     Lab Results   Component Value Date    VMO5KTYCBCBA 5.777 (H) 08/30/2023    TSH 1.93 09/14/2022     Lab Results   Component Value Date    LDLCALC 50 08/30/2023     Lab Results   Component Value Date     (H) 08/30/2023      Lab Results   Component Value Date    NTBNP 778 (H) 08/09/2021      Serum immunofixation shows no monoclonal immunoglobulins.   UPEP: no M spike  FLA ratio 2.2 - minimally elevated (normal if has CKD)  Remote RF and anti-CCP both wnl    TODAY'S PLAN:     04/29/24  Warm, euvolemic  No new cardiac complaints, feels generally well  There was a delay getting repeat BMP and FLA ratio but ultimately obtained with ongoing DARRIUS  She continues taking lasix 40 qd, has not " paused/reduced as previously advised    No recent PPM interrogations available to me now  Note Prior heavy vpacing burden    Reduce lasix 40 qd to 20 qd now  Advised copious hydration    Heme referral for elevated FLA ratio  Complicated by DARRIUS on possible CKD (though no clear Hx of CKD)  also in setting of her cardiac LVH and (minimal) concern for infiltrative CM, see prior comments    Cw aldactone 25 qd  reduce lasix 40 qd > 20 qd on 4/29/24  cw losartan 50 bid  jardiance 10 qd    On nebivolol 5 qd and verapamil 240 qd    On AC    On statin    Key info from my prior notes:    FLA ratio would be abnl if no CKD  Note She does not have significant CKD historically  Given workup to date, Lower suspicion but some mixed signals of infiltrative CM  Workup so far points away from aTTR CA  AL less likely given clinical course, though possible and FLA ratio not well explained as of yet  cannot check CMR 2/2 PPM model  Most past ECGs vpaced  No recent strain imaging    Serum immunofixation shows no monoclonal immunoglobulins.   UPEP: no M spike  FLA ratio 2.2 and 2.7 on repeat - minimally elevated absolute numbers and wnl if has CKD. These FLA ratios done when had DARRIUS  pyp without evidence of cardiac amyloidosis    Dopplers neg for DVT    HR in 70s now, heavy V pacing burden, minimal room for liberalization it seems  Fu EP  Extensive vpacing - monitor symptoms after diuresis and BP control, recheck echo and consider med change vs biv upgrade pending course (complex in her given age, frailty, past PPM lead extraction issues)    I am meeting patient for the first time today  Warm, mildly vol up on exam  Lasix recently doubled to lasix 20 bid from qd - within past week - making more urine, she feels slightly better now  Her main complaint is fatigue and SOB, sometimes profound, worse over past year since CVA and also after covid vaccine she says  High RV pacing noted  Office and her home SBP consistently in 130-140s, she keeps  good log at home    Her symptoms are likely multifactorial    Limited role for RHC/EMB at present time  May reconsider pending Tx as above    Her echo and clinical Hx suggests possible infiltrative CM such as cardiac amyloidosis  Of course she certainly has chronic HTN that can explain similar findings  Will pursue CA workup now - AL screen and pyp ordered  We discussed possible additional questions these tests may raise and potential need for more downstream tests if this leads us to confusion    PPM,  ICD , CRT (if applicable):  Interrogation:  11/1/23:  MDT-SINGLE CHAMBER PPM / NOT MRI CONDITIONAL   DEVICE INTERROGATED IN THE [facility] OFFICE. BATTERY VOLTAGE ADEQUATE (8.4 YRS).  70.7% (>40%/VVIR 60) ALL LEAD PARAMETERS WITHIN NORMAL LIMITS. NO SIGNIFICANT HIGH RATE EPISODES. NO PROGRAMMING CHANGES MADE TO DEVICE PARAMETERS. NORMAL DEVICE FUNCTION.     Studies:  I have reviewed all pertinent patient data/labs/imaging where available, including but not limited to the below studies. Selected results may be displayed here but comprehensive listing is omitted for note clarity and can be found in the epic chart.    ECG.    Echo.    Stress.    Cath.    HPI:   83 y.o. female Hx per chart p/w HF fu.  No new CP/SOB/dizziness/palpitations/syncope.  No new fatigue.  No new unintentional weight changes.  No new leg swelling, PND, pillow orthopnea.  No new fevers, chills, cough, nausea, vomiting, diarrhea, dysuria.      Interval History:   As noted in 'plan' section above and prior epic chart notes.    No new CP/SOB/dizziness/palpitations/syncope.  No new fatigue.  No new unintentional weight changes.  No new leg swelling, PND, pillow orthopnea.  No new fevers, chills, cough, nausea, vomiting, diarrhea, dysuria.    Past Medical History:   Diagnosis Date    A-fib (HCC)     Anemia     Arthritis     Breast pain, right     Chest pain on breathing     Colon polyp     Cough     Diverticulosis     Encounter for screening  colonoscopy     H/O sick sinus syndrome     Heart murmur     High cholesterol     History of atrial fibrillation     Rate ontrolled. On xarelto 15mg (creatinine 50) Followed by Dr. Randolph with Cardiology     History of weight loss     Report loose fitting clothes. Weight stable (3lbs difference). Last colo showed small tubular adenoma x2. Breast biopsy last showed fibrocystic changes. TSH wnl. Will check cbc, bmp, spep.        Hx of long term use of blood thinners     Hypertension     Irregular heart beat     RICH (obstructive sleep apnea)     Pacemaker     Preop examination     Shortness of breath     Viral bronchitis      Patient Active Problem List    Diagnosis Date Noted    At high risk for falls 03/13/2024    (HFpEF) heart failure with preserved ejection fraction (HCC) 03/13/2024    Central sleep apnea 02/05/2024    Dyspnea 08/07/2023    Foot drop, left foot 03/27/2023    RICH (obstructive sleep apnea)     Pulmonary hypertension ([facility]) 11/23/2022    Belching 10/05/2022    Transient neurological symptoms 09/08/2022    Thrush 09/06/2022    At risk for delirium 09/06/2022    Valvular heart disease 08/20/2022    CAD (coronary artery disease) 08/20/2022    Urinary retention 08/20/2022    Neck pain 08/20/2022    Adjustment disorder with mixed emotional features 08/20/2022    Anemia 08/19/2022    At risk for venous thromboembolism (VTE) 08/19/2022    DARRIUS (acute kidney injury) ([facility]) 08/19/2022    Spastic hemiparesis of left dominant side as late effect of cerebral infarction ([facility]) 08/19/2022    History of CVA (cerebrovascular accident) 08/12/2022    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 ([facility]) 08/12/2022    Renal insufficiency     Abnormal nuclear stress test 08/20/2021    Diverticulosis of colon 05/20/2019    Pacemaker 02/26/2018    Tubulovillous adenoma 02/09/2018    Gastroesophageal reflux disease without esophagitis 02/09/2018    Other chest pain 12/08/2017    Essential hypertension 12/08/2017     Hyperlipidemia LDL goal <100 12/08/2017    Stage 3 chronic kidney disease (HCC) 12/08/2017    Osteoarthritis of both wrists 12/08/2017    Chronic atrial fibrillation (HCC) 12/08/2017    Cavus deformity of foot 06/27/2017    Hallux abducto valgus, bilateral 06/27/2017    Lipoma of right upper extremity 05/17/2017    Pain due to onychomycosis of toenails of both feet 01/20/2017    Allergic rhinitis due to pollen 10/12/2015    Midline cystocele 12/23/2014    Osteoarthritis of hip 07/17/2014    B12 deficiency 12/06/2013    Osteopenia 11/15/2013    Left renal artery stenosis (HCC) 08/27/2013    Left atrial enlargement 08/27/2013       ROS:  10 point ROS negative except as specified in HPI/interval history    Allergies   Allergen Reactions    Other Itching     Bandaids    Tape [Medical Tape] Itching       Current Outpatient Medications   Medication Instructions    acetaminophen (TYLENOL) 650 mg, Oral, Every 6 hours PRN    ammonium lactate (LAC-HYDRIN) 12 % cream Topical, As needed    Blood Pressure Monitoring (Blood Pressure Cuff) MISC Does not apply, Every other day, For HTN    cyanocobalamin 1,000 mcg, Intramuscular, Every 30 days    Empagliflozin (JARDIANCE) 10 mg, Oral, Every morning    furosemide (LASIX) 20 mg, Oral, Daily    gabapentin (NEURONTIN) 100 mg, Oral, 3 times daily PRN    Incontinence Supply Disposable (RA Adult Wipes) MISC Does not apply, 4 times daily    losartan (COZAAR) 50 mg, Oral, 2 times daily    melatonin 3 mg, Oral, Every evening    menthol-methyl salicylate (BENGAY) 10-15 % cream Apply externally, 2 times daily    nebivolol (BYSTOLIC) 5 mg, Oral, Daily    nitroglycerin (NITROSTAT) 0.4 mg, Sublingual, Every 5 minutes PRN    pantoprazole (PROTONIX) 40 mg, Oral, Daily (early morning)    Pradaxa 150 MG capsu SB JUAN ANTONIO CAPSULA POR VIA ORAL DOS VECES AL KATRINA    rosuvastatin (CRESTOR) 5 mg, Oral, Daily    spironolactone (ALDACTONE) 25 mg, Oral, Daily    verapamil (CALAN-SR) 240 mg, Oral, Daily at bedtime         Social History     Socioeconomic History    Marital status:      Spouse name: Not on file    Number of children: Not on file    Years of education: Not on file    Highest education level: Not on file   Occupational History    Occupation: retired   Tobacco Use    Smoking status: Never    Smokeless tobacco: Never   Vaping Use    Vaping status: Never Used   Substance and Sexual Activity    Alcohol use: Never    Drug use: No    Sexual activity: Not Currently     Comment:    Other Topics Concern    Not on file   Social History Narrative    Housing, household, and economic circumstances      Social Determinants of Health     Financial Resource Strain: Low Risk  (3/13/2024)    Overall Financial Resource Strain (CARDIA)     Difficulty of Paying Living Expenses: Not hard at all   Food Insecurity: No Food Insecurity (3/13/2024)    Hunger Vital Sign     Worried About Running Out of Food in the Last Year: Never true     Ran Out of Food in the Last Year: Never true   Transportation Needs: No Transportation Needs (3/13/2024)    PRAPARE - Transportation     Lack of Transportation (Medical): No     Lack of Transportation (Non-Medical): No   Physical Activity: Inactive (4/14/2021)    Exercise Vital Sign     Days of Exercise per Week: 0 days     Minutes of Exercise per Session: 0 min   Stress: No Stress Concern Present (4/14/2021)    [employer] - Occupational Stress Questionnaire     Feeling of Stress : Not at all   Social Connections: Moderately Isolated (4/14/2021)    Social Connection and Isolation Panel [NHANES]     Frequency of Communication with Friends and Family: More than three times a week     Frequency of Social Gatherings with Friends and Family: More than three times a week     Attends Orthodoxy Services: More than 4 times per year     Active Member of Clubs or Organizations: No     Attends Club or Organization Meetings: Never     Marital Status:    Intimate  "Partner Violence: Not At Risk (4/14/2021)    Humiliation, Afraid, Rape, and Kick questionnaire     Fear of Current or Ex-Partner: No     Emotionally Abused: No     Physically Abused: No     Sexually Abused: No   Housing Stability: Low Risk  (3/13/2024)    Housing Stability Vital Sign     Unable to Pay for Housing in the Last Year: No     Number of Places Lived in the Last Year: 1     Unstable Housing in the Last Year: No       Family History   Problem Relation Age of Onset    Diabetes Mother     Breast cancer Sister 53    No Known Problems Father     No Known Problems Maternal Grandmother     No Known Problems Maternal Grandfather     No Known Problems Paternal Grandmother     No Known Problems Paternal Grandfather     No Known Problems Daughter     No Known Problems Son     No Known Problems Sister     No Known Problems Sister     No Known Problems Brother     No Known Problems Brother     No Known Problems Sister     No Known Problems Paternal Aunt     No Known Problems Paternal Aunt     No Known Problems Paternal Aunt     No Known Problems Paternal Aunt        Physical Exam:  Vitals:    04/29/24 1138   BP: 124/74   BP Location: Left arm   Patient Position: Sitting   Cuff Size: Standard   Pulse: 90   SpO2: 98%   Weight: 61.7 kg (136 lb 1.6 oz)   Height: 5' 3\" (1.6 m)       Constitutional: NAD, non toxic  Ears/nose/mouth/throat: atraumatic  CV: RRR, no JVD  Resp: ctabl  GI: Soft, NTND  MSK: no swollen joints in exposed areas  Extr: trace LE edema L>R (since CVA), warm LE  Pysche: Normal affect  Neuro: appropriate in conversation  Skin: dry and intact in exposed areas    Labs & Results:    Lab Results   Component Value Date    SODIUM 138 04/23/2024    K 4.4 04/23/2024     04/23/2024    CO2 24 04/23/2024    BUN 30 (H) 04/23/2024    CREATININE 1.78 (H) 04/23/2024    GLUC 88 09/20/2022    CALCIUM 9.6 04/23/2024     Lab Results   Component Value Date    WBC 6.74 08/30/2023    HGB 11.3 (L) 08/30/2023    HCT 36.4 " 08/30/2023    MCV 98 08/30/2023     08/30/2023     Lab Results   Component Value Date     (H) 08/30/2023      Lab Results   Component Value Date    CHOLESTEROL 118 08/30/2023    CHOLESTEROL 100 08/13/2022    CHOLESTEROL 149 08/16/2021     Lab Results   Component Value Date    HDL 52 08/30/2023    HDL 48 (L) 08/13/2022    HDL 62 08/16/2021     Lab Results   Component Value Date    TRIG 78 08/30/2023    TRIG 71 08/13/2022    TRIG 101 08/16/2021     Lab Results   Component Value Date    NONHDLC 52 08/13/2022    NONHDLC 87 08/16/2021    NONHDLC 89 08/18/2020       Counseling / Coordination of Care  Time spent today 28 minutes.  Greater than 50% of total time was spent with the patient and / or family counseling and / or coordination of care.  We discussed diagnoses, most recent studies, tests and any changes in treatment plan.    Thank you for the opportunity to participate in the care of this patient.    Shayne Herrmann MD  Attending Physician  Advanced Heart Failure and Transplant Cardiology  [facility]

## 2024-04-22 ENCOUNTER — CLINICAL SUPPORT (OUTPATIENT)
Dept: INTERNAL MEDICINE CLINIC | Facility: CLINIC | Age: 84
End: 2024-04-22

## 2024-04-22 DIAGNOSIS — E53.8 B12 DEFICIENCY: Primary | Chronic | ICD-10-CM

## 2024-04-22 PROCEDURE — 96372 THER/PROPH/DIAG INJ SC/IM: CPT | Performed by: INTERNAL MEDICINE

## 2024-04-22 RX ADMIN — CYANOCOBALAMIN 1000 MCG: 1000 INJECTION, SOLUTION INTRAMUSCULAR; SUBCUTANEOUS at 11:43

## 2024-04-22 NOTE — PROGRESS NOTES
Pt came in today 04/22/24  as a nurse visit for B12 injection. I administered 1 ml into Left Deltoid. Pt tolerated well. Pt made aware to come back in 30 days for next scheduled injection.      NDC: 40349-286-69

## 2024-04-23 ENCOUNTER — APPOINTMENT (OUTPATIENT)
Dept: LAB | Facility: CLINIC | Age: 84
End: 2024-04-23
Payer: MEDICARE

## 2024-04-23 DIAGNOSIS — I50.30 HEART FAILURE WITH PRESERVED EJECTION FRACTION, UNSPECIFIED HF CHRONICITY (HCC): ICD-10-CM

## 2024-04-23 LAB
ANION GAP SERPL CALCULATED.3IONS-SCNC: 13 MMOL/L (ref 4–13)
BUN SERPL-MCNC: 30 MG/DL (ref 5–25)
CALCIUM SERPL-MCNC: 9.6 MG/DL (ref 8.4–10.2)
CHLORIDE SERPL-SCNC: 101 MMOL/L (ref 96–108)
CO2 SERPL-SCNC: 24 MMOL/L (ref 21–32)
CREAT SERPL-MCNC: 1.78 MG/DL (ref 0.6–1.3)
GFR SERPL CREATININE-BSD FRML MDRD: 26 ML/MIN/1.73SQ M
GLUCOSE P FAST SERPL-MCNC: 100 MG/DL (ref 65–99)
POTASSIUM SERPL-SCNC: 4.4 MMOL/L (ref 3.5–5.3)
SODIUM SERPL-SCNC: 138 MMOL/L (ref 135–147)

## 2024-04-23 PROCEDURE — 36415 COLL VENOUS BLD VENIPUNCTURE: CPT

## 2024-04-23 PROCEDURE — 83521 IG LIGHT CHAINS FREE EACH: CPT

## 2024-04-23 PROCEDURE — 80048 BASIC METABOLIC PNL TOTAL CA: CPT

## 2024-04-24 ENCOUNTER — TELEPHONE (OUTPATIENT)
Dept: CARDIOLOGY CLINIC | Facility: CLINIC | Age: 84
End: 2024-04-24

## 2024-04-24 LAB
KAPPA LC FREE SER-MCNC: 78.6 MG/L (ref 3.3–19.4)
KAPPA LC FREE/LAMBDA FREE SER: 2.76 {RATIO} (ref 0.26–1.65)
LAMBDA LC FREE SERPL-MCNC: 28.5 MG/L (ref 5.7–26.3)

## 2024-04-24 NOTE — TELEPHONE ENCOUNTER
----- Message from Shayne Herrmann MD sent at 4/24/2024 12:58 PM EDT -----  Regarding: Please notify pt of the below  Her renal fxn has slightly worsened on recent labs. Please verify she is taking the below regimen, which is what she confirmed with me at our last visit. If so, please ask her to reduce losartan 100 qd to 50 qd and also hold lasix for 3 days and then restart at lasix 20 qd.    We will discuss repeating her labs at her upcoming visit.    Supposed current regimen:  Cw aldactone 25 qd  Cw lasix 40 qd  cw losartan 100 qd  jardiance 10 qd     On nebivolol 5 qd and verapamil 240 qd        Thanks!    - Shayne

## 2024-04-24 NOTE — TELEPHONE ENCOUNTER
Spoke with pt's son, son asked to contact him tomorrow at 9 sm to verify pt's medication and provide Dr. Herrmann dose changes.

## 2024-04-25 NOTE — TELEPHONE ENCOUNTER
Spoke with pt and son, advised them of Dr. Herrmann's message. Reviewed list of medications over the phone. Pt and son verbally understood. Pt will follow up with Dr. Herrmann at her upcomming appt on 04/29.

## 2024-04-29 ENCOUNTER — OFFICE VISIT (OUTPATIENT)
Dept: CARDIOLOGY CLINIC | Facility: CLINIC | Age: 84
End: 2024-04-29
Payer: MEDICARE

## 2024-04-29 VITALS
OXYGEN SATURATION: 98 % | DIASTOLIC BLOOD PRESSURE: 74 MMHG | HEART RATE: 90 BPM | SYSTOLIC BLOOD PRESSURE: 124 MMHG | WEIGHT: 136.1 LBS | BODY MASS INDEX: 24.11 KG/M2 | HEIGHT: 63 IN

## 2024-04-29 DIAGNOSIS — I27.20 PULMONARY HYPERTENSION (HCC): ICD-10-CM

## 2024-04-29 DIAGNOSIS — I51.7 LVH (LEFT VENTRICULAR HYPERTROPHY): ICD-10-CM

## 2024-04-29 DIAGNOSIS — I50.30 HEART FAILURE WITH PRESERVED EJECTION FRACTION, UNSPECIFIED HF CHRONICITY (HCC): Primary | ICD-10-CM

## 2024-04-29 DIAGNOSIS — I48.20 CHRONIC ATRIAL FIBRILLATION (HCC): ICD-10-CM

## 2024-04-29 PROCEDURE — 99214 OFFICE O/P EST MOD 30 MIN: CPT | Performed by: STUDENT IN AN ORGANIZED HEALTH CARE EDUCATION/TRAINING PROGRAM

## 2024-04-29 RX ORDER — FUROSEMIDE 20 MG/1
20 TABLET ORAL DAILY
Qty: 90 TABLET | Refills: 5 | Status: SHIPPED | OUTPATIENT
Start: 2024-04-29 | End: 2025-10-21

## 2024-05-01 ENCOUNTER — TELEPHONE (OUTPATIENT)
Dept: INTERNAL MEDICINE CLINIC | Facility: CLINIC | Age: 84
End: 2024-05-01

## 2024-05-01 ENCOUNTER — TELEPHONE (OUTPATIENT)
Dept: HEMATOLOGY ONCOLOGY | Facility: CLINIC | Age: 84
End: 2024-05-01

## 2024-05-01 ENCOUNTER — TELEPHONE (OUTPATIENT)
Dept: CARDIOLOGY CLINIC | Facility: CLINIC | Age: 84
End: 2024-05-01

## 2024-05-01 ENCOUNTER — HOME HEALTH ADMISSION (OUTPATIENT)
Dept: HOME HEALTH SERVICES | Facility: HOME HEALTHCARE | Age: 84
End: 2024-05-01
Payer: MEDICARE

## 2024-05-01 DIAGNOSIS — R53.1 LEFT-SIDED WEAKNESS: ICD-10-CM

## 2024-05-01 DIAGNOSIS — Z91.81 AT HIGH RISK FOR FALLS: Primary | ICD-10-CM

## 2024-05-01 NOTE — TELEPHONE ENCOUNTER
I called Ade in response to a referral that was received for patient to establish care with Hematology.     Outreach was made to schedule a consultation.    A consultation was scheduled for patient during this call. Patient is scheduled on 6/6/2024 at 9:30AM with Constance Glaser at the [facility]

## 2024-05-01 NOTE — TELEPHONE ENCOUNTER
Received call from Mary Jane with [facility] at  ext [phone]. Per Mary Jane, patient is requesting Home PT/OT with [facility]. Patient is having a difficult time with getting out of bed and transferring to the bathroom.     [facility] would need order for Home PT/OT and note indicating need for services.     Patient is also receiving waiver services for in home caregiver assistance. Patient currently receiving 84 hours a week. Patient requesting additional hours due to having a tough time, especially during the week with no one there during specific hours.     Contacted patient at . Interpretor ID [id number]. Patient confirmed she is interested in having PT/OT at home. Patient also stating she is only getting little bit of hours with waiver program and requesting more hours at night. Patient having a hard time at night getting up/down. All questions/concerns answered at this time.     Please review and advise

## 2024-05-01 NOTE — TELEPHONE ENCOUNTER
T/C from patient/[facility] interrp. #[id number]: patient is unable to locate her medtronic bedside monitor. Requesting a replacement be ordered.   [employer] called and new monitor to be shipped. Paitent should receive the monitor in 7-10 business days.   Instructed to call office for further assistance.

## 2024-05-02 ENCOUNTER — HOME CARE VISIT (OUTPATIENT)
Dept: HOME HEALTH SERVICES | Facility: HOME HEALTHCARE | Age: 84
End: 2024-05-02
Payer: MEDICARE

## 2024-05-02 VITALS
TEMPERATURE: 97.4 F | HEART RATE: 63 BPM | OXYGEN SATURATION: 95 % | HEIGHT: 63 IN | BODY MASS INDEX: 24.1 KG/M2 | SYSTOLIC BLOOD PRESSURE: 120 MMHG | WEIGHT: 136 LBS | DIASTOLIC BLOOD PRESSURE: 52 MMHG

## 2024-05-02 PROCEDURE — 400013 VN SOC

## 2024-05-02 PROCEDURE — G0151 HHCP-SERV OF PT,EA 15 MIN: HCPCS

## 2024-05-02 NOTE — CASE COMMUNICATION
[facility] has admitted your patient to Home Health service with the following disciplines:      PT and OT  Recommend [facility]; order was placed via [employer]  Response needed, please respond via in basket messaging concerning major drug to drug medication interaction    Primary focus of home health care NEUROLOGY    Patient stated goals of care to have better balance in order to get up and down from the toilet without losing her  balance, to have strength in legs, to have L foot and toes to stop curling in order to walk better    Anticipated visit pattern 1wk1 and starting week of 05/05/24 2wk3    See medication list - meds in home differ from AVS  Major drug to drug medication interaction:  1) spironolactone and losartan  2) Pradaxa and apixaban    Significant clinical findings edema LLE, inability to DF and PF L ankle->pt reports having x2 braces that do not w ork and are painful, unsteady and ataxic gait pattern, posterior lean during transfers and gait  Potential barriers to goal achievement small apartment, ataxic and unsteady gait and movement pattern  Other pertinent information pt reports h/o stroke for 2 years; has good supportive local family support    Thank you for allowing us to participate in the care of your patient.      Urmila REDDYT

## 2024-05-05 NOTE — PROGRESS NOTES
Sleep Medicine Outpatient Follow Up Note   Ade Moon 83 y.o. female MRN: [id number]  5/6/2024      Reason for Consultation:    Chief Complaint   Patient presents with    Sleep Apnea       Assessment/Plan:    1. Complex sleep apnea syndrome  Assessment & Plan:  Patient had RICH diagnosed over 12 years ago and tried CPAP.  Patient developed intolerance and stopped using it.  Due to cardiac issues she seek reevaluation and underwent a diagnostic sleep study 3/2023 that showed AHI 51.6 which were essentially all hypopneas but there was suspicion of central apneas due to Cheyne-Harrison pattern and periodic breathing pattern.    January 2024 CPAP study was ineffective.  Bilevel PAP was used up to 16/12 but no pressure could be recommended as central sleep apneas persisted.    2/5/2024 ASV study showed efficacy with ASV at EPAP range 8-15, pressure support 3-15.  Fullface mask recommended.    Unfortunately the patient never received a call from DME to set up ASV.  She is amenable for trial of ASV after counseling today and reviewing her sleep studies    We have contacted the DME rep to give her a call to set up ASV  We will review compliance 1 to 2 months after ASV initiation          Health Maintenance  Immunization History   Administered Date(s) Administered    COVID-19 MODERNA VACC 0.5 ML IM 02/19/2021, 05/27/2021    COVID-19 Pfizer Vac BIVALENT Jermaine-sucrose 12 Yr+ IM 09/09/2022    INFLUENZA 10/11/2005, 10/24/2006, 10/15/2007, 09/28/2015, 10/06/2016, 10/18/2017, 10/05/2022    Influenza Quadrivalent Preservative Free 3 years and older IM 10/06/2016    Influenza Quadrivalent, 6-35 Months IM 10/18/2017    Influenza, high dose seasonal 0.7 mL 10/12/2018, 09/23/2019, 09/23/2020, 09/22/2021, 10/05/2022, 10/19/2023    Influenza, seasonal, injectable 09/24/2013, 09/24/2014, 09/28/2015    Pneumococcal Conjugate 13-Valent 05/31/2016    Pneumococcal Polysaccharide PPV23 01/01/2001, 12/18/2019    Tdap 03/12/2014    Zoster  Vaccine Recombinant 03/13/2024        Return in about 2 months (around 7/6/2024).    History of Present Illness   HPI:  Ade Moon is a 83 y.o. female who has a past medical history of atrial fibrillation, heart failure preserved ejection fraction, pulmonary hypertension, hyperlipidemia, who is presenting for the evaluation of central sleep apnea not yet started on ASV    5/6/[id number] with me-visit conducted with medical resident who is fluent in [place].  Unfortunately she has not been called regarding ASV set up.  She is in good health and feels good.  She notes good sleep quality.  Occasionally she will have witnessed apneas.    2/5/2024-ASV study-ASV effective at min EPAP 8, max EPAP 15, pressure support 3-15 cm H2O.  Fullface mask recommended.  Increased EMG tone in the left lower extremity during REM sleep.  Increased periodic limb movements.    1/24/2024 CPAP study-ineffective CPAP study.  Bilevel PAP was used up to 16/12 but no pressure can be recommended based on residual AHI.  Central sleep apnea was noted and ASV titration was recommended      5/31/2023-consult with Dr. Desai.  Patient was diagnosed with RICH 12 years ago and tried CPAP.  She was then seen by Dr. Elaine gallardo with CPAP.  She struggled with tolerance and stopped using it.  Due to cardiac issues including atrial fibrillation/pulmonary hypertension she was encouraged to restart therapy.  She notes difficulty falling asleep, snoring, witnessed apneas, dry mouth.  Funk is 4.  CPAP study ordered    3/10/2023-diagnostic sleep study-100% supine sleep, AHI 51.6 essentially all hypopneas.  Cheyne-Harrison breathing was not present although a periodic breathing pattern was noted at times during the record.  No significant PLM's    Meds/Allergies     Current Outpatient Medications:     acetaminophen (TYLENOL) 325 mg tablet, Take 2 tablets (650 mg total) by mouth every 6 (six) hours as needed for mild pain, Disp: , Rfl: 0     ammonium lactate (LAC-HYDRIN) 12 % cream, APPLY TOPICALLY AS NEEDED FOR DRY SKIN (Patient taking differently: Apply 1 Application topically as needed for dry skin uses lotion BLE), Disp: 385 g, Rfl: 3    cyanocobalamin 1,000 mcg/mL, INJECT 1 ML (1,000 MCG TOTAL) INTO A MUSCLE EVERY 30 (THIRTY) DAYS, Disp: 3 mL, Rfl: 0    Empagliflozin (Jardiance) 10 MG TABS tablet, Take 1 tablet (10 mg total) by mouth every morning, Disp: 90 tablet, Rfl: 5    furosemide (LASIX) 20 mg tablet, Take 1 tablet (20 mg total) by mouth daily, Disp: 90 tablet, Rfl: 5    gabapentin (NEURONTIN) 100 mg capsule, TAKE 1 CAPSULE (100 MG TOTAL) BY MOUTH 3 (THREE) TIMES A DAY AS NEEDED (AS NEEDED FOR LEFT FOOT PAIN), Disp: 90 capsule, Rfl: 1    Incontinence Supply Disposable (RA Adult Wipes) MISC, Use 4 (four) times a day, Disp: 64 each, Rfl: 10    losartan (COZAAR) 50 mg tablet, Take 1 tablet (50 mg total) by mouth 2 (two) times a day, Disp: 180 tablet, Rfl: 5    nebivolol (BYSTOLIC) 5 mg tablet, TAKE 1 TABLET (5 MG TOTAL) BY MOUTH DAILY, Disp: 90 tablet, Rfl: 3    pantoprazole (PROTONIX) 40 mg tablet, TAKE 1 TABLET (40 MG TOTAL) BY MOUTH DAILY IN THE EARLY MORNING, Disp: 90 tablet, Rfl: 3    Pradaxa 150 MG capsu, SB JUAN ANTONIO CAPSULA POR VIA ORAL DOS VECES AL KATRINA, Disp: 180 capsule, Rfl: 3    rosuvastatin (CRESTOR) 5 mg tablet, TAKE 1 TABLET (5 MG TOTAL) BY MOUTH DAILY, Disp: 90 tablet, Rfl: 3    spironolactone (ALDACTONE) 25 mg tablet, Take 1 tablet (25 mg total) by mouth daily, Disp: 90 tablet, Rfl: 5    verapamil (CALAN-SR) 240 mg CR tablet, TAKE 1 TABLET (240 MG TOTAL) BY MOUTH DAILY AT BEDTIME, Disp: 90 tablet, Rfl: 3    Blood Pressure Monitoring (Blood Pressure Cuff) MISC, Use every other day For HTN (Patient not taking: Reported on 8/15/2023), Disp: 1 each, Rfl: 0    melatonin 3 mg, TAKE 1 TABLET (3 MG TOTAL) BY MOUTH EVERY EVENING (Patient not taking: Reported on 11/1/2023), Disp: 90 tablet, Rfl: 3    menthol-methyl salicylate (BENGAY) 10-15  "% cream, Apply topically 2 (two) times a day (Patient not taking: Reported on 5/2/2024), Disp: , Rfl: 0    nitroglycerin (NITROSTAT) 0.4 mg SL tablet, Place 1 tablet (0.4 mg total) under the tongue every 5 (five) minutes as needed for chest pain (Patient not taking: Reported on 8/3/2023), Disp: , Rfl: 0    Current Facility-Administered Medications:     cyanocobalamin injection 1,000 mcg, 1,000 mcg, Intramuscular, Q30 Days, Deepak Hermosillo MD, 1,000 mcg at 04/22/24 1143  Allergies   Allergen Reactions    Other Itching     Bandaids    Tape [Medical Tape] Itching       Vitals: Blood pressure 118/62, height 5' 3\" (1.6 m), weight 63 kg (139 lb). Body mass index is 24.62 kg/m².      Physical Exam  Vitals and nursing note reviewed.   Constitutional:       General: She is not in acute distress.     Appearance: Normal appearance. She is well-developed. She is not ill-appearing, toxic-appearing or diaphoretic.      Comments: Using 4 point walker   HENT:      Head: Normocephalic and atraumatic.   Eyes:      General: No scleral icterus.     Extraocular Movements: Extraocular movements intact.      Conjunctiva/sclera: Conjunctivae normal.   Cardiovascular:      Rate and Rhythm: Normal rate.   Pulmonary:      Effort: Pulmonary effort is normal. No respiratory distress.      Breath sounds: Normal breath sounds.   Abdominal:      Tenderness: There is no guarding.   Musculoskeletal:         General: No swelling.      Cervical back: Normal range of motion and neck supple. No rigidity.      Right lower leg: No edema.      Left lower leg: No edema.   Skin:     General: Skin is warm and dry.      Coloration: Skin is not jaundiced.   Neurological:      General: No focal deficit present.      Mental Status: She is alert and oriented to person, place, and time. Mental status is at baseline.   Psychiatric:         Mood and Affect: Mood normal.             Labs:   I have personally reviewed pertinent lab results.    ABG: No results found for: " "\"PHART\", \"DZE8TFR\", \"PO2ART\", \"PIB1ZJW\", \"E8OTIFCK\", \"BEART\", \"SOURCE\",   CBC:  Lab Results   Component Value Date    WBC 6.74 2023    HGB 11.3 (L) 2023    HCT 36.4 2023    MCV 98 2023     2023    EOSPCT 2 2023    EOSABS 0.11 2023    NEUTOPHILPCT 70 2023    LYMPHOPCT 19 2023   ,   CMP:   Lab Results   Component Value Date    SODIUM 138 2024    K 4.4 2024     2024    CO2 24 2024    ANIONGAP 7 2015    BUN 30 (H) 2024    CREATININE 1.78 (H) 2024    GLUCOSE 88 2015    CALCIUM 9.6 2024    AST 16 2023    ALT 7 2023    ALKPHOS 82 2023    PROT 7.8 2015    BILITOT 0.76 2015    EGFR 26 2024   ,   Ferrtin: No components found for: \"FERRTIN\",  Magensium: No results found for: \"MAGNESIUM\",    Imaging and other studies: I have personally reviewed pertinent reports.   and I have personally reviewed pertinent films in PACS      Sleep Study:  2024-ASV study-ASV effective at min EPAP 8, max EPAP 15, pressure support 3-15 cm H2O.  Fullface mask recommended.  Increased EMG tone in the left lower extremity during REM sleep.  Increased periodic limb movements.    2024 CPAP study-ineffective CPAP study.  Bilevel PAP was used up to  but no pressure can be recommended based on residual AHI.  Central sleep apnea was noted and ASV titration was recommended    3/10/2023-diagnostic sleep study-100% supine sleep, AHI 51.6 essentially all hypopneas.  Cheyne-Harrison breathing was not present although a periodic breathing pattern was noted at times during the record.  No significant PLM's      Transthoracic Echo:  [id number] normal.  RV normal.  RA moderately dilated.  Mild to moderate aortic regurgitation.  Mild MR.  Mild to moderate TR.  RVSP 54 mmHg.    Pulmonary Function Testin2023 PFT  Normal spirometry, lung volumes, diffusion capacity.  Normal flow volume " "loop.      Denis Perez MD  Pulmonary, Critical Care and Sleep Medicine  [facility]     Portions of the record may have been created with voice recognition software. Occasional wrong word or \"sound a like\" substitutions may have occurred due to the inherent limitations of voice recognition software. Please read the chart carefully and recognize, using context, where substitutions have occurred.     "

## 2024-05-06 ENCOUNTER — OFFICE VISIT (OUTPATIENT)
Dept: SLEEP CENTER | Facility: CLINIC | Age: 84
End: 2024-05-06
Payer: MEDICARE

## 2024-05-06 ENCOUNTER — TELEPHONE (OUTPATIENT)
Dept: INTERNAL MEDICINE CLINIC | Facility: CLINIC | Age: 84
End: 2024-05-06

## 2024-05-06 ENCOUNTER — HOME CARE VISIT (OUTPATIENT)
Dept: HOME HEALTH SERVICES | Facility: HOME HEALTHCARE | Age: 84
End: 2024-05-06
Payer: MEDICARE

## 2024-05-06 VITALS
HEIGHT: 63 IN | BODY MASS INDEX: 24.63 KG/M2 | SYSTOLIC BLOOD PRESSURE: 118 MMHG | DIASTOLIC BLOOD PRESSURE: 62 MMHG | WEIGHT: 139 LBS

## 2024-05-06 DIAGNOSIS — G47.31 COMPLEX SLEEP APNEA SYNDROME: Primary | ICD-10-CM

## 2024-05-06 PROBLEM — G47.39 COMPLEX SLEEP APNEA SYNDROME: Status: ACTIVE | Noted: 2024-02-05

## 2024-05-06 PROCEDURE — 99213 OFFICE O/P EST LOW 20 MIN: CPT | Performed by: INTERNAL MEDICINE

## 2024-05-06 PROCEDURE — G2211 COMPLEX E/M VISIT ADD ON: HCPCS | Performed by: INTERNAL MEDICINE

## 2024-05-06 NOTE — PATIENT INSTRUCTIONS
[facility] Sleep Medicine Nursing Support:  When: Monday through Friday 7A-5PM except holidays  Where: Our direct line is [phone].    If you are having problems with equipment or need a mask fitting you may want to reach out to your durable medical equipment company first    IMPORTANT CONTACT PHONE NUMBERS:  Both the Sleep Medicine and Pulmonary Department manages CPAP/BIPAPs.  If depends on where you are typically seen on who to contact  [facility] Sleep Medicine Nursing Support: [phone] (if you are seen in a [facility] Sleep Medicine office)  [facility] Pulmonary: [phone] (if you are seen in a [facility] Pulmonary office)  [facility] - [phone] ([place]), [phone] ([place])  If you are unsure, please send me a message on [employer] and I can help    CPAP/BIPAP MAINTENANCE AND MANAGEMENT  1.  Continue use of CPAP/BIPAP equipment nightly - use any time you are laying down to rest, watch TV, etc to increase use and in case you fall asleep to prevent falling asleep without it  2.  If air is too hot, turn down the tubing heating setting  3.  If excessive dry mouth in the morning, turn up humidifier setting and be sure to only use distilled water in your humidifier.  You can also turn down the tubing heating setting  4.  Change your filter regularly and wash the non-disposable filter  5.  Continue to clean your equipment, as discussed, using mild soap and water.  You can use unscented baby wipe on the mask.  6.  Contact the Sleep Disorders Center with any questions or concerns prior to your next visit, as needed  7.  Schedule visit for follow-up in 2-3 months.  You should see your sleep physician at least once a year.      HOW TO CLEAN YOUR AUTO CPAP MACHINE  Mask: Clean the cushion of your mask daily. Dish soap and warm water.  (Usually eligible for mask cushions every 3 months)  2.  Headgear: Once a week. I find when you wash it daily it eats the material of the Velcro  faster.  (Usually eligible for new headgear every 6 months)  3.  Heated Tubing: Clean the tube every 3-7 days. One drop of dish soap run warm water through the tube then hang it over your shower curtain and let it drip dry. (Usually eligible every 3 months)  4.  Humidifier: Rinse out every 1-3 days. Dish soap warm water or , top shelf. (eligible every 6 months) **DISTILLED WATER ONLY**  5. Filters:  Dark blue (reusable for 6 months)- Rinse under warm water (no soap) sit and drip dry every 2-3 weeks.  (eligible for a new one every 6 months)  Light Blue (disposable) throw away every 2-4 weeks depending on how dirty it looks. (eligible for 6 every 3 months)    Medicare/Private Insurance Requirements with CPAP  Your insurance requires a face-to-face follow up visit within a 31-90 day period after starting CPAP.  Your insurance requires compliance with CPAP, which is at least 4 hours per night for 70% of the time. This must be done over a 30 day period and must occur within the initial 31-90 day period after starting CPAP.  Your insurance also requires at least yearly follow ups to continue to pay for CPAP supplies.  Check with your insurance and durable medical equipment company regarding supply replacements.     OTHER SLEEP MEDICINE ISSUES  If you are having a true emergency please call 911.  In the event that the line is busy or it is after hours please leave a voice message and we will return your call.  Please speak clearly, leaving your full name, birth date, best number to reach you and the reason for your call.   Medication refills: We will need the name of the medication, the dosage, the ordering provider, whether you get a 30 or 90 day refill, and the pharmacy name and address.  Medications will be ordered by the provider only.  Nurses cannot call in prescriptions.  Please allow 7 days for medication refills.  Physician requested updates: If your provider requested that you call with an update after  starting medication, please be ready to provide us the medication and dosage, what time you take your medication, the time you attempt to fall asleep, time you fall asleep, when you wake up, and what time you get out of bed.  Sleep Study Results: We will contact you with sleep study results and/or next steps after the physician has reviewed your testing.  Usually one of our nurses will call to talk about the results within 1-2 weeks of testing.

## 2024-05-06 NOTE — TELEPHONE ENCOUNTER
Folder Color- Red     Name of Form- [facility]    Form to be filled out by- Dr Hermosillo    Form to be Faxed (fax number), Mailed (address), or picked up (by whom)    Patient made aware of 10 business day policy.

## 2024-05-06 NOTE — ASSESSMENT & PLAN NOTE
Patient had RICH diagnosed over 12 years ago and tried CPAP.  Patient developed intolerance and stopped using it.  Due to cardiac issues she seek reevaluation and underwent a diagnostic sleep study 3/2023 that showed AHI 51.6 which were essentially all hypopneas but there was suspicion of central apneas due to Cheyne-Harrison pattern and periodic breathing pattern.    January 2024 CPAP study was ineffective.  Bilevel PAP was used up to 16/12 but no pressure could be recommended as central sleep apneas persisted.    2/5/2024 ASV study showed efficacy with ASV at EPAP range 8-15, pressure support 3-15.  Fullface mask recommended.    Unfortunately the patient never received a call from DME to set up ASV.  She is amenable for trial of ASV after counseling today and reviewing her sleep studies    We have contacted the DME rep to give her a call to set up ASV  We will review compliance 1 to 2 months after ASV initiation

## 2024-05-07 ENCOUNTER — OFFICE VISIT (OUTPATIENT)
Dept: INTERNAL MEDICINE CLINIC | Facility: CLINIC | Age: 84
End: 2024-05-07

## 2024-05-07 ENCOUNTER — HOME CARE VISIT (OUTPATIENT)
Dept: HOME HEALTH SERVICES | Facility: HOME HEALTHCARE | Age: 84
End: 2024-05-07
Payer: MEDICARE

## 2024-05-07 VITALS
WEIGHT: 138.2 LBS | DIASTOLIC BLOOD PRESSURE: 68 MMHG | OXYGEN SATURATION: 97 % | SYSTOLIC BLOOD PRESSURE: 120 MMHG | HEART RATE: 65 BPM | TEMPERATURE: 98.6 F | BODY MASS INDEX: 24.48 KG/M2

## 2024-05-07 DIAGNOSIS — I48.20 CHRONIC ATRIAL FIBRILLATION (HCC): Primary | ICD-10-CM

## 2024-05-07 DIAGNOSIS — I10 ESSENTIAL HYPERTENSION: ICD-10-CM

## 2024-05-07 DIAGNOSIS — N17.9 AKI (ACUTE KIDNEY INJURY) (HCC): ICD-10-CM

## 2024-05-07 DIAGNOSIS — Z91.81 AT HIGH RISK FOR FALLS: ICD-10-CM

## 2024-05-07 DIAGNOSIS — I50.32 CHRONIC HEART FAILURE WITH PRESERVED EJECTION FRACTION (HCC): ICD-10-CM

## 2024-05-07 DIAGNOSIS — G47.00 INSOMNIA: ICD-10-CM

## 2024-05-07 DIAGNOSIS — G47.33 OSA (OBSTRUCTIVE SLEEP APNEA): ICD-10-CM

## 2024-05-07 DIAGNOSIS — I63.30 CEREBRAL THROMBOSIS WITH CEREBRAL INFARCTION (HCC): ICD-10-CM

## 2024-05-07 DIAGNOSIS — E53.8 B12 DEFICIENCY: Chronic | ICD-10-CM

## 2024-05-07 PROCEDURE — 99213 OFFICE O/P EST LOW 20 MIN: CPT | Performed by: STUDENT IN AN ORGANIZED HEALTH CARE EDUCATION/TRAINING PROGRAM

## 2024-05-07 PROCEDURE — 3074F SYST BP LT 130 MM HG: CPT | Performed by: STUDENT IN AN ORGANIZED HEALTH CARE EDUCATION/TRAINING PROGRAM

## 2024-05-07 PROCEDURE — 3078F DIAST BP <80 MM HG: CPT | Performed by: STUDENT IN AN ORGANIZED HEALTH CARE EDUCATION/TRAINING PROGRAM

## 2024-05-07 PROCEDURE — G0180 MD CERTIFICATION HHA PATIENT: HCPCS | Performed by: INTERNAL MEDICINE

## 2024-05-07 RX ORDER — LANOLIN ALCOHOL/MO/W.PET/CERES
3 CREAM (GRAM) TOPICAL EVERY EVENING
Qty: 90 TABLET | Refills: 3 | Status: SHIPPED | OUTPATIENT
Start: 2024-05-07

## 2024-05-07 RX ORDER — CYANOCOBALAMIN 1000 UG/ML
1000 INJECTION, SOLUTION INTRAMUSCULAR; SUBCUTANEOUS
Qty: 3 ML | Refills: 0 | Status: SHIPPED | OUTPATIENT
Start: 2024-05-07 | End: 2024-08-05

## 2024-05-07 NOTE — ASSESSMENT & PLAN NOTE
Currently follows with HF.   GDMT: Jardiance, Spirolactone, Losartan  -Lasix 20 daily    Lasix reduced by HF to 20  Continue current medications-will get BMP to check Cr and determine if patient should stop losartan

## 2024-05-07 NOTE — ASSESSMENT & PLAN NOTE
Cr 1.78 from 1.33 (baseline appears ~1)  -Lasix decreased by cardiology to 20 daily  -BMP ordered  -Will follow up with Cr to determine next steps

## 2024-05-07 NOTE — ASSESSMENT & PLAN NOTE
History of uncontrolled BP. Currently on losartan 50 BID, bystolic, Verapamil, Aldactone  -BP today 120/68    Continue current regimen  Will determine next steps with losartan after BMP is back

## 2024-05-07 NOTE — ASSESSMENT & PLAN NOTE
Patient does have known RICH. Has sleep appt in 9/2024.  -Saw Pepito yesterday on 5/6 and will ship machine to house

## 2024-05-07 NOTE — PROGRESS NOTES
[facility]  INTERNAL MEDICINE OFFICE VISIT     PATIENT INFORMATION     Ade Moon   83 y.o. female   MRN: [id number]    ASSESSMENT/PLAN     Problem List Items Addressed This Visit       Essential hypertension     History of uncontrolled BP. 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 Does have LLE weakness that is chronic and LLE swelling          DARRIUS (acute kidney injury) ([facility])     Cr 1.78 from 1.33 (baseline appears ~1)  -Lasix decreased by cardiology to 20 daily  -BMP ordered  -Will follow up with Cr to determine next steps           Relevant Orders    Basic metabolic panel    RICH (obstructive sleep apnea)     Patient does have known RICH. Has sleep appt in 9/2024.  -Saw Pulm yesterday on 5/6 and will ship machine to house         At high risk for falls    Relevant Orders    Ambulatory Referral to Social Work Care Management Program    (HFpEF) heart failure with preserved ejection fraction (HCC)     Currently follows with HF.   GDMT: Jardiance, Spirolactone, Losartan  -Lasix 20 daily    Lasix reduced by HF to 20  Continue current medications-will get BMP to check Cr and determine if patient should stop losartan           Other Visit Diagnoses       Insomnia        Relevant Medications    melatonin 3 mg          Schedule a follow-up appointment in 3 months.    HEALTH MAINTENANCE     Immunization History   Administered Date(s) Administered    COVID-19 MODERNA VACC 0.5 ML IM 02/19/2021, 05/27/2021    COVID-19 Pfizer Vac BIVALENT Jermaine-sucrose 12 Yr+ IM  09/09/2022    INFLUENZA 10/11/2005, 10/24/2006, 10/15/2007, 09/28/2015, 10/06/2016, 10/18/2017, 10/05/2022    Influenza Quadrivalent Preservative Free 3 years and older IM 10/06/2016    Influenza Quadrivalent, 6-35 Months IM 10/18/2017    Influenza, high dose seasonal 0.7 mL 10/12/2018, 09/23/2019, 09/23/2020, 09/22/2021, 10/05/2022, 10/19/2023    Influenza, seasonal, injectable 09/24/2013, 09/24/2014, 09/28/2015    Pneumococcal Conjugate 13-Valent 05/31/2016    Pneumococcal Polysaccharide PPV23 01/01/2001, 12/18/2019    Tdap 03/12/2014    Zoster Vaccine Recombinant 03/13/2024     CHIEF COMPLAINT     Chief Complaint   Patient presents with    Follow-up      HISTORY OF PRESENT ILLNESS     Patient is an 83-year-old with extensive past medical history including hypertension, hyperlipidemia, CKD 3, A-fib, CVA, CAD, RICH, pulmonary hypertension, tachybradycardia syndrome (pacemaker placed), heart failure preserved ejection fraction.  Patient is presenting today for management of chronic conditions. Patient states that overall she feels quite poor. Patient states her appetite is good on some days and not good on others. Patient was just seen by cardiology and did have medication adjustments that she is following.     Patient also states she is currently going back and forth with the [employer] [facility] company to get more help as she feels she does need nearly 24/7 care.     REVIEW OF SYSTEMS     Review of Systems   Constitutional:  Positive for fatigue.   HENT: Negative.     Respiratory:  Positive for shortness of breath.    Cardiovascular:  Negative for chest pain, palpitations and leg swelling.   Gastrointestinal:         Decreased appetite    Neurological:  Negative for dizziness, weakness and headaches.   Psychiatric/Behavioral: Negative.       OBJECTIVE     Vitals:    05/07/24 1337   BP: 120/68   BP Location: Left arm   Patient Position: Sitting   Cuff Size: Standard   Pulse: 65   Temp: 98.6 °F (37 °C)   TempSrc: Temporal    SpO2: 97%   Weight: 62.7 kg (138 lb 3.2 oz)     Physical Exam  Constitutional:       Appearance: Normal appearance.   HENT:      Head: Normocephalic and atraumatic.      Mouth/Throat:      Mouth: Mucous membranes are moist.      Pharynx: Oropharynx is clear.   Cardiovascular:      Rate and Rhythm: Normal rate. Rhythm irregular.   Pulmonary:      Effort: Pulmonary effort is normal.      Breath sounds: Normal breath sounds.   Abdominal:      General: Abdomen is flat. Bowel sounds are normal. There is no distension.      Tenderness: There is no abdominal tenderness.   Musculoskeletal:      Right lower leg: No edema.      Left lower leg: Edema present.   Skin:     General: Skin is warm and dry.   Neurological:      Mental Status: She is alert and oriented to person, place, and time.   Psychiatric:         Mood and Affect: Mood normal.         Behavior: Behavior normal.       CURRENT MEDICATIONS     Current Outpatient Medications:     cyanocobalamin 1,000 mcg/mL, Inject 1 mL (1,000 mcg total) into a muscle every 30 (thirty) days, Disp: 3 mL, Rfl: 0    melatonin 3 mg, Take 1 tablet (3 mg total) by mouth every evening, Disp: 90 tablet, Rfl: 3    acetaminophen (TYLENOL) 325 mg tablet, Take 2 tablets (650 mg total) by mouth every 6 (six) hours as needed for mild pain, Disp: , Rfl: 0    ammonium lactate (LAC-HYDRIN) 12 % cream, APPLY TOPICALLY AS NEEDED FOR DRY SKIN (Patient taking differently: Apply 1 Application topically as needed for dry skin uses lotion BLE), Disp: 385 g, Rfl: 3    Blood Pressure Monitoring (Blood Pressure Cuff) MISC, Use every other day For HTN (Patient not taking: Reported on 8/15/2023), Disp: 1 each, Rfl: 0    Empagliflozin (Jardiance) 10 MG TABS tablet, Take 1 tablet (10 mg total) by mouth every morning, Disp: 90 tablet, Rfl: 5    furosemide (LASIX) 20 mg tablet, Take 1 tablet (20 mg total) by mouth daily, Disp: 90 tablet, Rfl: 5    gabapentin (NEURONTIN) 100 mg capsule, TAKE 1 CAPSULE (100 MG  TOTAL) BY MOUTH 3 (THREE) TIMES A DAY AS NEEDED (AS NEEDED FOR LEFT FOOT PAIN), Disp: 90 capsule, Rfl: 1    Incontinence Supply Disposable (RA Adult Wipes) MISC, Use 4 (four) times a day, Disp: 64 each, Rfl: 10    losartan (COZAAR) 50 mg tablet, Take 1 tablet (50 mg total) by mouth 2 (two) times a day, Disp: 180 tablet, Rfl: 5    menthol-methyl salicylate (BENGAY) 10-15 % cream, Apply topically 2 (two) times a day (Patient not taking: Reported on 5/2/2024), Disp: , Rfl: 0    nebivolol (BYSTOLIC) 5 mg tablet, TAKE 1 TABLET (5 MG TOTAL) BY MOUTH DAILY, Disp: 90 tablet, Rfl: 3    nitroglycerin (NITROSTAT) 0.4 mg SL tablet, Place 1 tablet (0.4 mg total) under the tongue every 5 (five) minutes as needed for chest pain (Patient not taking: Reported on 8/3/2023), Disp: , Rfl: 0    pantoprazole (PROTONIX) 40 mg tablet, TAKE 1 TABLET (40 MG TOTAL) BY MOUTH DAILY IN THE EARLY MORNING, Disp: 90 tablet, Rfl: 3    Pradaxa 150 MG capsu, SB JUAN ANTONIO CAPSULA POR VIA ORAL DOS VECES AL KATRINA, Disp: 180 capsule, Rfl: 3    rosuvastatin (CRESTOR) 5 mg tablet, TAKE 1 TABLET (5 MG TOTAL) BY MOUTH DAILY, Disp: 90 tablet, Rfl: 3    spironolactone (ALDACTONE) 25 mg tablet, Take 1 tablet (25 mg total) by mouth daily, Disp: 90 tablet, Rfl: 5    verapamil (CALAN-SR) 240 mg CR tablet, TAKE 1 TABLET (240 MG TOTAL) BY MOUTH DAILY AT BEDTIME, Disp: 90 tablet, Rfl: 3    Current Facility-Administered Medications:     cyanocobalamin injection 1,000 mcg, 1,000 mcg, Intramuscular, Q30 Days, Deepak Hermosillo MD, 1,000 mcg at 04/22/24 1143    PAST MEDICAL & SURGICAL HISTORY     Past Medical History:   Diagnosis Date    A-fib (HCC)     Anemia     Arthritis     Breast pain, right     Chest pain on breathing     Colon polyp     Cough     Diverticulosis     Encounter for screening colonoscopy     H/O sick sinus syndrome     Heart murmur     High cholesterol     History of atrial fibrillation     Rate ontrolled. On xarelto 15mg (creatinine 50) Followed by Dr. Randolph  with Cardiology     History of weight loss     Report loose fitting clothes. Weight stable (3lbs difference). Last colo showed small tubular adenoma x2. Breast biopsy last showed fibrocystic changes. TSH wnl. Will check cbc, bmp, spep.        Hx of long term use of blood thinners     Hypertension     Irregular heart beat     RICH (obstructive sleep apnea)     Pacemaker     Preop examination     Shortness of breath     Viral bronchitis      Past Surgical History:   Procedure Laterality Date    BREAST BIOPSY Right 08/17/2006    ultrasound guided Percutaneous Needle Core -Benign    CATARACT EXTRACTION      CATARACT EXTRACTION W/ INTRAOCULAR LENS  IMPLANT, BILATERAL      COLONOSCOPY      COLONOSCOPY N/A 05/22/2018    Procedure: COLONOSCOPY;  Surgeon: Jenn Jang DO;  [facility];  Service: Gastroenterology    INSERT / REPLACE / REMOVE PACEMAKER      LEG SURGERY Right     as a child after a fall    MAMMO STEREOTACTIC BREAST BIOPSY RIGHT (ALL INC) Right 10/2014    benign    UT CMBND ANTERPOST COLPORRAPHY W/CYSTO N/A 03/17/2016    Procedure: COLPORRHAPHY ANTERIOR POSTERIOR ;  Surgeon: Dario Braden MD;  [facility];  Service: Gynecology    UT COLONOSCOPY FLX DX W/COLLJ SPEC WHEN PFRMD N/A 04/04/2019    Procedure: COLONOSCOPY;  Surgeon: Jenn Jang DO;  [facility]: [facility];  Service: Gastroenterology    UT COLPOPEXY VAGINAL EXTRAPERITONEAL APPROACH N/A 03/17/2016    Procedure: COLPOPEXY VAGINAL EXTRAPERITONEAL (VEC) ANTERIOR ;  Surgeon: Dario Braden MD;  [facility];  Service: Gynecology    UT CYSTOURETHROSCOPY N/A 03/17/2016    Procedure: CYSTOSCOPY;  Surgeon: Dario Braden MD;  [facility];  Service: Gynecology    UT ESOPHAGOGASTRODUODENOSCOPY TRANSORAL DIAGNOSTIC N/A 04/16/2018    Procedure: EGD AND COLONOSCOPY;  Surgeon: Jenn Jang DO;  [facility];  Service: Gastroenterology    UT LAPAROSCOPY COLECTOMY PARTIAL W/ANASTOMOSIS Right  01/13/2022    Procedure: Diagnostic laparoscopy, laparscopic right colectomy;  Surgeon: Andriy Guevara MD;  [facility]: [facility];  Service: Colorectal    AK SLING OPERATION STRESS INCONTINENCE N/A 03/17/2016    Procedure: INSERTION PUBOVAGINAL SLING SINGLE INCISION ;  Surgeon: Dario Braden MD;  [facility];  Service: Gynecology     SOCIAL & FAMILY HISTORY     Social History     Socioeconomic History    Marital status:      Spouse name: Not on file    Number of children: Not on file    Years of education: Not on file    Highest education level: Not on file   Occupational History    Occupation: retired   Tobacco Use    Smoking status: Never    Smokeless tobacco: Never   Vaping Use    Vaping status: Never Used   Substance and Sexual Activity    Alcohol use: Never    Drug use: No    Sexual activity: Not Currently     Comment:    Other Topics Concern    Not on file   Social History Narrative    Housing, household, and economic circumstances      Social Determinants of Health     Financial Resource Strain: Low Risk  (3/13/2024)    Overall Financial Resource Strain (CARDIA)     Difficulty of Paying Living Expenses: Not hard at all   Food Insecurity: No Food Insecurity (3/13/2024)    Hunger Vital Sign     Worried About Running Out of Food in the Last Year: Never true     Ran Out of Food in the Last Year: Never true   Transportation Needs: No Transportation Needs (3/13/2024)    PRAPARE - Transportation     Lack of Transportation (Medical): No     Lack of Transportation (Non-Medical): No   Physical Activity: Inactive (4/14/2021)    Exercise Vital Sign     Days of Exercise per Week: 0 days     Minutes of Exercise per Session: 0 min   Stress: No Stress Concern Present (4/14/2021)    [employer] [place] [employer] - Occupational Stress Questionnaire     Feeling of Stress : Not at all   Social Connections: Moderately Isolated (4/14/2021)    Social Connection and Isolation Panel  [NHANES]     Frequency of Communication with Friends and Family: More than three times a week     Frequency of Social Gatherings with Friends and Family: More than three times a week     Attends Evangelical Services: More than 4 times per year     Active Member of Clubs or Organizations: No     Attends Club or Organization Meetings: Never     Marital Status:    Intimate Partner Violence: Not At Risk (4/14/2021)    Humiliation, Afraid, Rape, and Kick questionnaire     Fear of Current or Ex-Partner: No     Emotionally Abused: No     Physically Abused: No     Sexually Abused: No   Housing Stability: Low Risk  (3/13/2024)    Housing Stability Vital Sign     Unable to Pay for Housing in the Last Year: No     Number of Places Lived in the Last Year: 1     Unstable Housing in the Last Year: No     Social History     Substance and Sexual Activity   Alcohol Use Never       Social History     Substance and Sexual Activity   Drug Use No     Social History     Tobacco Use   Smoking Status Never   Smokeless Tobacco Never     Family History   Problem Relation Age of Onset    Diabetes Mother     Breast cancer Sister 53    No Known Problems Father     No Known Problems Maternal Grandmother     No Known Problems Maternal Grandfather     No Known Problems Paternal Grandmother     No Known Problems Paternal Grandfather     No Known Problems Daughter     No Known Problems Son     No Known Problems Sister     No Known Problems Sister     No Known Problems Brother     No Known Problems Brother     No Known Problems Sister     No Known Problems Paternal Aunt     No Known Problems Paternal Aunt     No Known Problems Paternal Aunt     No Known Problems Paternal Aunt      ==  Nolberto Hermosillo MD  PGY-2  [facility] Internal Medicine Residency    [address], [address]  [place] [address]  Office: [phone]  Fax: [phone]

## 2024-05-09 ENCOUNTER — HOME CARE VISIT (OUTPATIENT)
Dept: HOME HEALTH SERVICES | Facility: HOME HEALTHCARE | Age: 84
End: 2024-05-09
Payer: MEDICARE

## 2024-05-09 VITALS — OXYGEN SATURATION: 97 % | SYSTOLIC BLOOD PRESSURE: 122 MMHG | DIASTOLIC BLOOD PRESSURE: 60 MMHG | HEART RATE: 63 BPM

## 2024-05-09 PROCEDURE — G0151 HHCP-SERV OF PT,EA 15 MIN: HCPCS

## 2024-05-09 PROCEDURE — G0152 HHCP-SERV OF OT,EA 15 MIN: HCPCS

## 2024-05-10 ENCOUNTER — REMOTE DEVICE CLINIC VISIT (OUTPATIENT)
Dept: CARDIOLOGY CLINIC | Facility: CLINIC | Age: 84
End: 2024-05-10
Payer: MEDICARE

## 2024-05-10 ENCOUNTER — HOME CARE VISIT (OUTPATIENT)
Dept: HOME HEALTH SERVICES | Facility: HOME HEALTHCARE | Age: 84
End: 2024-05-10
Payer: MEDICARE

## 2024-05-10 VITALS — HEART RATE: 70 BPM | OXYGEN SATURATION: 95 %

## 2024-05-10 DIAGNOSIS — Z95.0 CARDIAC PACEMAKER IN SITU: Primary | ICD-10-CM

## 2024-05-10 PROCEDURE — 93296 REM INTERROG EVL PM/IDS: CPT | Performed by: STUDENT IN AN ORGANIZED HEALTH CARE EDUCATION/TRAINING PROGRAM

## 2024-05-10 PROCEDURE — G0151 HHCP-SERV OF PT,EA 15 MIN: HCPCS

## 2024-05-10 PROCEDURE — 93294 REM INTERROG EVL PM/LDLS PM: CPT | Performed by: STUDENT IN AN ORGANIZED HEALTH CARE EDUCATION/TRAINING PROGRAM

## 2024-05-10 NOTE — PROGRESS NOTES
Results for orders placed or performed in visit on 05/10/24   Cardiac EP device report    Narrative    MDT-SINGLE CHAMBER PPM / NOT MRI CONDITIONAL  CARELINK TRANSMISSION: SINCE 11/1/23: BATTERY VOLTAGE ADEQUATE (8.2 YRS).  66.1% (>40%/VVIR 60PPM); ALL AVAILABLE LEAD PARAMETERS WITHIN NORMAL LIMITS. NO SIGNIFICANT HIGH RATE EPISODES. EF 65% (2/22/24 ECHO); PATIENT TAKING PRADAXA, VERAPAMIL, BISOPROLOL. NORMAL DEVICE FUNCTION.  ES

## 2024-05-13 ENCOUNTER — TELEPHONE (OUTPATIENT)
Dept: INTERNAL MEDICINE CLINIC | Facility: CLINIC | Age: 84
End: 2024-05-13

## 2024-05-13 NOTE — TELEPHONE ENCOUNTER
Folder Color- Red    Name of Form- [facility]     Form to be filled out by- Dr. Hermosillo     Form to be Faxed [phone]    Patient made aware of 10 business day policy.

## 2024-05-14 ENCOUNTER — HOME CARE VISIT (OUTPATIENT)
Dept: HOME HEALTH SERVICES | Facility: HOME HEALTHCARE | Age: 84
End: 2024-05-14
Payer: MEDICARE

## 2024-05-14 VITALS — DIASTOLIC BLOOD PRESSURE: 70 MMHG | OXYGEN SATURATION: 96 % | SYSTOLIC BLOOD PRESSURE: 142 MMHG | HEART RATE: 75 BPM

## 2024-05-14 PROCEDURE — G0151 HHCP-SERV OF PT,EA 15 MIN: HCPCS

## 2024-05-15 ENCOUNTER — HOME CARE VISIT (OUTPATIENT)
Dept: HOME HEALTH SERVICES | Facility: HOME HEALTHCARE | Age: 84
End: 2024-05-15
Payer: MEDICARE

## 2024-05-15 DIAGNOSIS — M21.372 FOOT DROP, LEFT FOOT: Primary | ICD-10-CM

## 2024-05-15 PROCEDURE — G0152 HHCP-SERV OF OT,EA 15 MIN: HCPCS

## 2024-05-16 ENCOUNTER — HOME CARE VISIT (OUTPATIENT)
Dept: HOME HEALTH SERVICES | Facility: HOME HEALTHCARE | Age: 84
End: 2024-05-16
Payer: MEDICARE

## 2024-05-16 VITALS — HEART RATE: 54 BPM | OXYGEN SATURATION: 96 % | DIASTOLIC BLOOD PRESSURE: 58 MMHG | SYSTOLIC BLOOD PRESSURE: 120 MMHG

## 2024-05-16 PROCEDURE — G0151 HHCP-SERV OF PT,EA 15 MIN: HCPCS

## 2024-05-21 ENCOUNTER — HOME CARE VISIT (OUTPATIENT)
Dept: HOME HEALTH SERVICES | Facility: HOME HEALTHCARE | Age: 84
End: 2024-05-21
Payer: MEDICARE

## 2024-05-21 VITALS — SYSTOLIC BLOOD PRESSURE: 124 MMHG | DIASTOLIC BLOOD PRESSURE: 64 MMHG | OXYGEN SATURATION: 96 % | HEART RATE: 84 BPM

## 2024-05-21 VITALS — SYSTOLIC BLOOD PRESSURE: 124 MMHG | HEART RATE: 60 BPM | DIASTOLIC BLOOD PRESSURE: 68 MMHG | OXYGEN SATURATION: 96 %

## 2024-05-21 PROCEDURE — G0151 HHCP-SERV OF PT,EA 15 MIN: HCPCS

## 2024-05-21 PROCEDURE — G0152 HHCP-SERV OF OT,EA 15 MIN: HCPCS

## 2024-05-23 ENCOUNTER — HOME CARE VISIT (OUTPATIENT)
Dept: HOME HEALTH SERVICES | Facility: HOME HEALTHCARE | Age: 84
End: 2024-05-23
Payer: MEDICARE

## 2024-05-23 VITALS — DIASTOLIC BLOOD PRESSURE: 70 MMHG | HEART RATE: 75 BPM | SYSTOLIC BLOOD PRESSURE: 130 MMHG | OXYGEN SATURATION: 97 %

## 2024-05-23 DIAGNOSIS — I50.30 HEART FAILURE WITH PRESERVED EJECTION FRACTION, UNSPECIFIED HF CHRONICITY (HCC): Primary | ICD-10-CM

## 2024-05-23 PROCEDURE — G0151 HHCP-SERV OF PT,EA 15 MIN: HCPCS

## 2024-05-23 RX ORDER — FUROSEMIDE 20 MG/1
20 TABLET ORAL DAILY
Qty: 90 TABLET | Refills: 5 | Status: SHIPPED | OUTPATIENT
Start: 2024-05-23 | End: 2025-11-14

## 2024-05-24 ENCOUNTER — PATIENT OUTREACH (OUTPATIENT)
Dept: INTERNAL MEDICINE CLINIC | Facility: CLINIC | Age: 84
End: 2024-05-24

## 2024-05-24 NOTE — PROGRESS NOTES
PORSPER received a new referral on 05/07/2024 in regard to pt who is noted as having a high risk for falling. PROSPER attempted to reach pt today using [employer] services,  # [phone].  was able to reach pt and introduce self and role.   Pt shared she has caregiver support through the Waiver program. Pt receives support Monday Through Friday 10:00 am - 11:00 pm and Saturday and Sunday 2:00 pm - 11:00 pm. The agency that provides that HHC is [employer].  Pts concern is that she has difficulty getting up in the morning or if she needs to use the bathroom, she would like additional HHC hours. Per pt she has requested numerous times and has been denied. Per pt her  is [employer] @ [phone] ext [phone]. PROSPER contacted the number and is unsure if is the correct number as no name was on the voicemail. A message was left to please return JULIOCESAR call. JULIOCESAR will remain available and continue to support pt.     After call with pt, PROSPER contacted [employer] @ [phone] and was provided the same phone number to reach pts .

## 2024-05-27 ENCOUNTER — TELEPHONE (OUTPATIENT)
Dept: OTHER | Facility: OTHER | Age: 84
End: 2024-05-27

## 2024-05-29 ENCOUNTER — CLINICAL SUPPORT (OUTPATIENT)
Dept: INTERNAL MEDICINE CLINIC | Facility: CLINIC | Age: 84
End: 2024-05-29

## 2024-05-29 DIAGNOSIS — E53.8 B12 DEFICIENCY: Primary | ICD-10-CM

## 2024-05-29 PROCEDURE — 96372 THER/PROPH/DIAG INJ SC/IM: CPT

## 2024-05-29 RX ADMIN — CYANOCOBALAMIN 1000 MCG: 1000 INJECTION, SOLUTION INTRAMUSCULAR; SUBCUTANEOUS at 09:11

## 2024-05-29 NOTE — PROGRESS NOTES
Pt came in 5/29/24 for B 12 injection, pt brought in her own medication, injection was administered on her Right deltoid, pt tolerated well.

## 2024-05-30 ENCOUNTER — PATIENT OUTREACH (OUTPATIENT)
Dept: INTERNAL MEDICINE CLINIC | Facility: CLINIC | Age: 84
End: 2024-05-30

## 2024-05-30 DIAGNOSIS — M79.672 LEFT FOOT PAIN: ICD-10-CM

## 2024-05-30 DIAGNOSIS — Z86.73 HISTORY OF CVA (CEREBROVASCULAR ACCIDENT): ICD-10-CM

## 2024-05-30 RX ORDER — GABAPENTIN 100 MG/1
100 CAPSULE ORAL 3 TIMES DAILY PRN
Qty: 90 CAPSULE | Refills: 1 | Status: SHIPPED | OUTPATIENT
Start: 2024-05-30

## 2024-05-30 NOTE — PROGRESS NOTES
Chart review indicates that [facility] outreached pt on 5/24. Pt stating that she receives support Mon-Fri 10a-11p and Sat and Sunday 2p-11p. HHC is through [employer] [facility]. Pt concern is that she has difficulty getting up in the morning and if she needs the bathroom she would like a HHC aide there and is requesting additional waiver hours. Has requested numerous times and was denied. [facility] did try to reach  and left VM on 5/24, no return call received. [facility] outreached coordinator again today, [employer] p, [phone] ex [phone], 2nd VM left. [facility] called [facility] as the line for coordinator on file appears questionable if correct phone number for . Spoke to a representative and then transferred to another representative, on hold for extended amount of time. Confirmed that pt coordinator is Willie Betancourt, p, [phone]. [facility] called Willie and name on VM is not the same as name provided. PROSPER to follow.

## 2024-05-31 ENCOUNTER — PATIENT OUTREACH (OUTPATIENT)
Dept: INTERNAL MEDICINE CLINIC | Facility: CLINIC | Age: 84
End: 2024-05-31

## 2024-05-31 NOTE — PROGRESS NOTES
SW has reached out to pt via  ID # [id number] to f/u with pt re her request to increase her [place] Hours.   Pt shares she has not hear any else but she did meet recently with her  Willie Betancourt  [phone].  Pt thinks she may be approved for 124 hours/week.    SW assisted with a 3 way call to Ms. Betancourt # but SW had to leave a message.  JULIOCESAR then called her at [phone] She shares her Office # is [phone].  She said the evaluation went into the insurance and they are waiting gpt there decision. She is hopeful it will be approved.  She and pt will inform SW of decision when known.

## 2024-06-04 ENCOUNTER — PATIENT OUTREACH (OUTPATIENT)
Dept: INTERNAL MEDICINE CLINIC | Facility: CLINIC | Age: 84
End: 2024-06-04

## 2024-06-04 NOTE — PROGRESS NOTES
SW received a return call from rosi who shares the her [place] Hours have been increased.  She will now be getting 8 hours /day.   This will help her have coverage over night.  Pt is very happy with this out come.

## 2024-06-06 ENCOUNTER — OFFICE VISIT (OUTPATIENT)
Dept: HEMATOLOGY ONCOLOGY | Facility: CLINIC | Age: 84
End: 2024-06-06
Payer: MEDICARE

## 2024-06-06 VITALS
BODY MASS INDEX: 24.27 KG/M2 | HEART RATE: 89 BPM | OXYGEN SATURATION: 97 % | HEIGHT: 63 IN | TEMPERATURE: 97.5 F | RESPIRATION RATE: 17 BRPM | WEIGHT: 137 LBS | SYSTOLIC BLOOD PRESSURE: 124 MMHG | DIASTOLIC BLOOD PRESSURE: 66 MMHG

## 2024-06-06 DIAGNOSIS — I51.7 LVH (LEFT VENTRICULAR HYPERTROPHY): ICD-10-CM

## 2024-06-06 DIAGNOSIS — R76.8 ELEVATED SERUM IMMUNOGLOBULIN FREE LIGHT CHAINS: ICD-10-CM

## 2024-06-06 DIAGNOSIS — N18.30 STAGE 3 CHRONIC KIDNEY DISEASE, UNSPECIFIED WHETHER STAGE 3A OR 3B CKD (HCC): Primary | Chronic | ICD-10-CM

## 2024-06-06 DIAGNOSIS — D64.9 ANEMIA, UNSPECIFIED TYPE: ICD-10-CM

## 2024-06-06 DIAGNOSIS — D50.9 IRON DEFICIENCY ANEMIA, UNSPECIFIED IRON DEFICIENCY ANEMIA TYPE: ICD-10-CM

## 2024-06-06 DIAGNOSIS — E53.8 B12 DEFICIENCY: Chronic | ICD-10-CM

## 2024-06-06 DIAGNOSIS — E53.8 FOLATE DEFICIENCY: ICD-10-CM

## 2024-06-06 PROCEDURE — 99204 OFFICE O/P NEW MOD 45 MIN: CPT | Performed by: NURSE PRACTITIONER

## 2024-06-06 NOTE — PROGRESS NOTES
[facility] [place]  [address] [facility] [address]  [phone]  Hematology Ambulatory Consult  Ade Moon, 1940, [id number]  6/6/2024      Assessment and Plan   1. LVH (left ventricular hypertrophy)  2. Stage 3 chronic kidney disease, unspecified whether stage 3a or 3b CKD (HCC)  3. B12 deficiency  4. Anemia, unspecified type  5. Elevated serum immunoglobulin free light chains  6. Folate deficiency  7. 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  Patient was found to have elevated kappa and lambda light chain ratio.  In August 2023 patient had an elevated kappa free light chain at 51.1, normal lambda light chain, ratio 2.22.  Most recent showed an elevated kappa free light chain 78.6, free lambda 28.5, ratio 2.76.  She had a creatinine of 1.78, EGFR 26 with 9.6.  I do not have an updated CBC since August 2023, which showed a hemoglobin of 11.3, RBC 3.72, normal WBC, platelets and differential.  Creatinine at that time was 1.20, EGFR 41.  SPEP showed an abnormal distribution in the gamma region immunofixation showed no monoclonal immunoglobulins.   We discussed abnormal light chains are likely secondary to CKD given negative previous SPEP.  Quantitative immunoglobulins were not obtained.  We will obtain updated myeloma panel as well as an erythropoietin level and nutritional studies.  Patient does have a history of vitamin B12 deficiency and receives monthly injections.  I suspect elevated free light chains are secondary to kidney dysfunction rather than a bone marrow disorder such as multiple myeloma or MGUS.  We will plan to  see her in 3 weeks.    - Ambulatory Referral to Hematology / Oncology  - CBC and differential; Future  - Comprehensive metabolic panel; Future  - Erythropoietin; Future  - Folate; Future  - IgG, IgA, IgM; Future  - Vitamin B12; Future  - Immunoglobulin free LT chains blood; Future  - Protein electrophoresis, serum; Future    Patient and her son verbalized understanding and is in agreement to the plan. Patient knows to call the Hematology/Oncology office with any questions and concerns regarding the above.    Barrier(s) to care: None.   The patient is able to self care.    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    06/06/24: Clinically stable    Review of Systems   Constitutional:  Negative for activity change, appetite change, fatigue, fever and unexpected weight change.   Respiratory:  Negative for cough and shortness of breath.    Cardiovascular:  Negative for chest pain and leg swelling.   Gastrointestinal:  Negative for abdominal pain, constipation, diarrhea and nausea.   Endocrine: Negative for cold intolerance and heat intolerance.   Musculoskeletal:  Negative for arthralgias and myalgias.   Skin: Negative.    Neurological:  Negative for dizziness, weakness and headaches.   Hematological:  Negative for adenopathy. Does not bruise/bleed easily.       Past Medical History:   Diagnosis Date    A-fib (HCC)     Anemia     Arthritis     Breast pain, right     Chest pain on breathing     Colon  polyp     Cough     Diverticulosis     Encounter for screening colonoscopy     H/O sick sinus syndrome     Heart murmur     High cholesterol     History of atrial fibrillation     Rate ontrolled. On xarelto 15mg (creatinine 50) Followed by Dr. Randolph with Cardiology     History of weight loss     Report loose fitting clothes. Weight stable (3lbs difference). Last colo showed small tubular adenoma x2. Breast biopsy last showed fibrocystic changes. TSH wnl. Will check cbc, bmp, spep.        Hx of long term use of blood thinners     Hypertension     Irregular heart beat     RICH (obstructive sleep apnea)     Pacemaker     Preop examination     Shortness of breath     Viral bronchitis      Past Surgical History:   Procedure Laterality Date    BREAST BIOPSY Right 08/17/2006    ultrasound guided Percutaneous Needle Core -Benign    CATARACT EXTRACTION      CATARACT EXTRACTION W/ INTRAOCULAR LENS  IMPLANT, BILATERAL      COLONOSCOPY      COLONOSCOPY N/A 05/22/2018    Procedure: COLONOSCOPY;  Surgeon: Jenn Jang DO;  [facility];  Service: Gastroenterology    INSERT / REPLACE / REMOVE PACEMAKER      LEG SURGERY Right     as a child after a fall    MAMMO STEREOTACTIC BREAST BIOPSY RIGHT (ALL INC) Right 10/2014    benign    AZ CMBND ANTERPOST COLPORRAPHY W/CYSTO N/A 03/17/2016    Procedure: COLPORRHAPHY ANTERIOR POSTERIOR ;  Surgeon: Dario Braden MD;  [facility];  Service: Gynecology    AZ COLONOSCOPY FLX DX W/COLLJ SPEC WHEN PFRMD N/A 04/04/2019    Procedure: COLONOSCOPY;  Surgeon: Jenn Jang DO;  [facility];  Service: Gastroenterology    AZ COLPOPEXY VAGINAL EXTRAPERITONEAL APPROACH N/A 03/17/2016    Procedure: COLPOPEXY VAGINAL EXTRAPERITONEAL (VEC) ANTERIOR ;  Surgeon: Dario Braden MD;  [facility];  Service: Gynecology    AZ CYSTOURETHROSCOPY N/A 03/17/2016    Procedure: CYSTOSCOPY;  Surgeon: Dario Braden MD;  [facility];  Service:  Gynecology    AR ESOPHAGOGASTRODUODENOSCOPY TRANSORAL DIAGNOSTIC N/A 04/16/2018    Procedure: EGD AND COLONOSCOPY;  Surgeon: Jenn Jang DO;  [facility];  Service: Gastroenterology    AR LAPAROSCOPY COLECTOMY PARTIAL W/ANASTOMOSIS Right 01/13/2022    Procedure: Diagnostic laparoscopy, laparscopic right colectomy;  Surgeon: Andriy Guevara MD;  [facility];  Service: Colorectal    AR SLING OPERATION STRESS INCONTINENCE N/A 03/17/2016    Procedure: INSERTION PUBOVAGINAL SLING SINGLE INCISION ;  Surgeon: Dario Braden MD;  [facility];  Service: Gynecology     Family History   Problem Relation Age of Onset    Diabetes Mother     Breast cancer Sister 53    No Known Problems Father     No Known Problems Maternal Grandmother     No Known Problems Maternal Grandfather     No Known Problems Paternal Grandmother     No Known Problems Paternal Grandfather     No Known Problems Daughter     No Known Problems Son     No Known Problems Sister     No Known Problems Sister     No Known Problems Brother     No Known Problems Brother     No Known Problems Sister     No Known Problems Paternal Aunt     No Known Problems Paternal Aunt     No Known Problems Paternal Aunt     No Known Problems Paternal Aunt      Social History     Socioeconomic History    Marital status:      Spouse name: Not on file    Number of children: Not on file    Years of education: Not on file    Highest education level: Not on file   Occupational History    Occupation: retired   Tobacco Use    Smoking status: Never    Smokeless tobacco: Never   Vaping Use    Vaping status: Never Used   Substance and Sexual Activity    Alcohol use: Never    Drug use: No    Sexual activity: Not Currently     Comment:    Other Topics Concern    Not on file   Social History Narrative    Housing, household, and economic circumstances      Social Determinants of Health     Financial Resource Strain: Low Risk  (3/13/2024)     Overall Financial Resource Strain (CARDIA)     Difficulty of Paying Living Expenses: Not hard at all   Food Insecurity: No Food Insecurity (3/13/2024)    Hunger Vital Sign     Worried About Running Out of Food in the Last Year: Never true     Ran Out of Food in the Last Year: Never true   Transportation Needs: No Transportation Needs (3/13/2024)    PRAPARE - Transportation     Lack of Transportation (Medical): No     Lack of Transportation (Non-Medical): No   Physical Activity: Inactive (4/14/2021)    Exercise Vital Sign     Days of Exercise per Week: 0 days     Minutes of Exercise per Session: 0 min   Stress: No Stress Concern Present (4/14/2021)    [employer] - Occupational Stress Questionnaire     Feeling of Stress : Not at all   Social Connections: Moderately Isolated (4/14/2021)    Social Connection and Isolation Panel [NHANES]     Frequency of Communication with Friends and Family: More than three times a week     Frequency of Social Gatherings with Friends and Family: More than three times a week     Attends Protestant Services: More than 4 times per year     Active Member of Clubs or Organizations: No     Attends Club or Organization Meetings: Never     Marital Status:    Intimate Partner Violence: Not At Risk (4/14/2021)    Humiliation, Afraid, Rape, and Kick questionnaire     Fear of Current or Ex-Partner: No     Emotionally Abused: No     Physically Abused: No     Sexually Abused: No   Housing Stability: Low Risk  (3/13/2024)    Housing Stability Vital Sign     Unable to Pay for Housing in the Last Year: No     Number of Places Lived in the Last Year: 1     Unstable Housing in the Last Year: No         Current Outpatient Medications:     acetaminophen (TYLENOL) 325 mg tablet, Take 2 tablets (650 mg total) by mouth every 6 (six) hours as needed for mild pain, Disp: , Rfl: 0    ammonium lactate (LAC-HYDRIN) 12 % cream, APPLY TOPICALLY AS NEEDED FOR DRY SKIN (Patient  taking differently: Apply 1 Application topically as needed for dry skin uses lotion BLE), Disp: 385 g, Rfl: 3    cyanocobalamin 1,000 mcg/mL, Inject 1 mL (1,000 mcg total) into a muscle every 30 (thirty) days, Disp: 3 mL, Rfl: 0    Empagliflozin (Jardiance) 10 MG TABS tablet, Take 1 tablet (10 mg total) by mouth every morning, Disp: 90 tablet, Rfl: 5    furosemide (LASIX) 20 mg tablet, Take 1 tablet (20 mg total) by mouth daily patient taking 2 tablets by mouth daily, Disp: 90 tablet, Rfl: 5    gabapentin (NEURONTIN) 100 mg capsule, TAKE 1 CAPSULE (100 MG TOTAL) BY MOUTH 3 (THREE) TIMES A DAY AS NEEDED (AS NEEDED FOR LEFT FOOT PAIN), Disp: 90 capsule, Rfl: 1    Incontinence Supply Disposable (RA Adult Wipes) MISC, Use 4 (four) times a day, Disp: 64 each, Rfl: 10    losartan (COZAAR) 50 mg tablet, Take 1 tablet (50 mg total) by mouth 2 (two) times a day, Disp: 180 tablet, Rfl: 5    nebivolol (BYSTOLIC) 5 mg tablet, TAKE 1 TABLET (5 MG TOTAL) BY MOUTH DAILY, Disp: 90 tablet, Rfl: 3    pantoprazole (PROTONIX) 40 mg tablet, TAKE 1 TABLET (40 MG TOTAL) BY MOUTH DAILY IN THE EARLY MORNING, Disp: 90 tablet, Rfl: 3    Pradaxa 150 MG capsu, SB JUAN ANTONIO CAPSULA POR VIA ORAL DOS VECES AL KATRINA, Disp: 180 capsule, Rfl: 3    rosuvastatin (CRESTOR) 5 mg tablet, TAKE 1 TABLET (5 MG TOTAL) BY MOUTH DAILY, Disp: 90 tablet, Rfl: 3    spironolactone (ALDACTONE) 25 mg tablet, Take 1 tablet (25 mg total) by mouth daily, Disp: 90 tablet, Rfl: 5    verapamil (CALAN-SR) 240 mg CR tablet, TAKE 1 TABLET (240 MG TOTAL) BY MOUTH DAILY AT BEDTIME, Disp: 90 tablet, Rfl: 3    Blood Pressure Monitoring (Blood Pressure Cuff) MISC, Use every other day For HTN (Patient not taking: Reported on 8/15/2023), Disp: 1 each, Rfl: 0    nitroglycerin (NITROSTAT) 0.4 mg SL tablet, Place 1 tablet (0.4 mg total) under the tongue every 5 (five) minutes as needed for chest pain (Patient not taking: Reported on 8/3/2023), Disp: , Rfl: 0    Current  "Facility-Administered Medications:     cyanocobalamin injection 1,000 mcg, 1,000 mcg, Intramuscular, Q30 Days, Deepak Hermosillo MD, 1,000 mcg at 05/29/24 0911  Allergies   Allergen Reactions    Other Itching     Bandaids    Tape [Medical Tape] Itching       Objective   /66 (BP Location: Left arm, Patient Position: Sitting, Cuff Size: Adult)   Pulse 89   Temp 97.5 °F (36.4 °C)   Resp 17   Ht 5' 3\" (1.6 m)   Wt 62.1 kg (137 lb)   SpO2 97%   BMI 24.27 kg/m²   Physical Exam  Vitals reviewed.   Constitutional:       Appearance: Normal appearance. She is well-developed.   HENT:      Head: Normocephalic and atraumatic.   Eyes:      Conjunctiva/sclera: Conjunctivae normal.      Pupils: Pupils are equal, round, and reactive to light.   Pulmonary:      Effort: Pulmonary effort is normal. No respiratory distress.   Musculoskeletal:         General: Normal range of motion.      Cervical back: Normal range of motion.   Lymphadenopathy:      Cervical: No cervical adenopathy.   Skin:     General: Skin is dry.   Neurological:      Mental Status: She is alert and oriented to person, place, and time.   Psychiatric:         Behavior: Behavior normal.         Result Review  Labs:  Lab Results   Component Value Date    WBC 6.74 08/30/2023    HGB 11.3 (L) 08/30/2023    HCT 36.4 08/30/2023    MCV 98 08/30/2023     08/30/2023     Lab Results   Component Value Date     11/25/2015    SODIUM 138 04/23/2024    K 4.4 04/23/2024     04/23/2024    CO2 24 04/23/2024    ANIONGAP 7 11/25/2015    AGAP 13 04/23/2024    BUN 30 (H) 04/23/2024    CREATININE 1.78 (H) 04/23/2024    GLUC 88 09/20/2022    GLUF 100 (H) 04/23/2024    CALCIUM 9.6 04/23/2024    AST 16 08/30/2023    ALT 7 08/30/2023    ALKPHOS 82 08/30/2023    PROT 7.8 11/25/2015    TP 7.4 08/30/2023    TP 7.1 08/30/2023    BILITOT 0.76 11/25/2015    TBILI 0.61 08/30/2023    EGFR 26 04/23/2024     Imaging:   Cardiac EP device report    Result Date: " 5/10/2024  Narrative: MDT-SINGLE CHAMBER PPM / NOT MRI CONDITIONAL CARELINK TRANSMISSION: SINCE 11/1/23: BATTERY VOLTAGE ADEQUATE (8.2 YRS).  66.1% (>40%/VVIR 60PPM); ALL AVAILABLE LEAD PARAMETERS WITHIN NORMAL LIMITS. NO SIGNIFICANT HIGH RATE EPISODES. EF 65% (2/22/24 ECHO); PATIENT TAKING PRADAXA, VERAPAMIL, BISOPROLOL. NORMAL DEVICE FUNCTION.  ES       Please note:  This report has been generated by a voice recognition software system. Therefore there may be syntax, spelling, and/or grammatical errors. Please call if you have any questions.

## 2024-06-12 ENCOUNTER — TELEPHONE (OUTPATIENT)
Dept: INTERNAL MEDICINE CLINIC | Facility: CLINIC | Age: 84
End: 2024-06-12

## 2024-06-12 NOTE — TELEPHONE ENCOUNTER
Attempted to schedule from Recall    3 months (around 8/7/2024) for With Cass.   With Dr Hermosillo

## 2024-06-15 ENCOUNTER — APPOINTMENT (OUTPATIENT)
Dept: LAB | Facility: CLINIC | Age: 84
End: 2024-06-15
Payer: MEDICARE

## 2024-06-15 DIAGNOSIS — E53.8 FOLATE DEFICIENCY: ICD-10-CM

## 2024-06-15 DIAGNOSIS — N17.9 AKI (ACUTE KIDNEY INJURY) (HCC): ICD-10-CM

## 2024-06-15 DIAGNOSIS — I51.7 LVH (LEFT VENTRICULAR HYPERTROPHY): ICD-10-CM

## 2024-06-15 DIAGNOSIS — D50.9 IRON DEFICIENCY ANEMIA, UNSPECIFIED IRON DEFICIENCY ANEMIA TYPE: ICD-10-CM

## 2024-06-15 DIAGNOSIS — R76.8 ELEVATED SERUM IMMUNOGLOBULIN FREE LIGHT CHAINS: ICD-10-CM

## 2024-06-15 DIAGNOSIS — E53.8 B12 DEFICIENCY: Chronic | ICD-10-CM

## 2024-06-15 DIAGNOSIS — N18.30 STAGE 3 CHRONIC KIDNEY DISEASE, UNSPECIFIED WHETHER STAGE 3A OR 3B CKD (HCC): ICD-10-CM

## 2024-06-15 DIAGNOSIS — D64.9 ANEMIA, UNSPECIFIED TYPE: ICD-10-CM

## 2024-06-15 LAB
ALBUMIN SERPL BCP-MCNC: 3.9 G/DL (ref 3.5–5)
ALP SERPL-CCNC: 70 U/L (ref 34–104)
ALT SERPL W P-5'-P-CCNC: 8 U/L (ref 7–52)
ANION GAP SERPL CALCULATED.3IONS-SCNC: 6 MMOL/L (ref 4–13)
AST SERPL W P-5'-P-CCNC: 15 U/L (ref 13–39)
BASOPHILS # BLD AUTO: 0.05 THOUSANDS/ÂΜL (ref 0–0.1)
BASOPHILS NFR BLD AUTO: 1 % (ref 0–1)
BILIRUB SERPL-MCNC: 0.63 MG/DL (ref 0.2–1)
BUN SERPL-MCNC: 22 MG/DL (ref 5–25)
CALCIUM SERPL-MCNC: 9.5 MG/DL (ref 8.4–10.2)
CHLORIDE SERPL-SCNC: 102 MMOL/L (ref 96–108)
CO2 SERPL-SCNC: 28 MMOL/L (ref 21–32)
CREAT SERPL-MCNC: 1.31 MG/DL (ref 0.6–1.3)
EOSINOPHIL # BLD AUTO: 0.08 THOUSAND/ÂΜL (ref 0–0.61)
EOSINOPHIL NFR BLD AUTO: 1 % (ref 0–6)
ERYTHROCYTE [DISTWIDTH] IN BLOOD BY AUTOMATED COUNT: 13 % (ref 11.6–15.1)
FERRITIN SERPL-MCNC: 19 NG/ML (ref 11–307)
FOLATE SERPL-MCNC: 20.6 NG/ML
GFR SERPL CREATININE-BSD FRML MDRD: 37 ML/MIN/1.73SQ M
GLUCOSE P FAST SERPL-MCNC: 101 MG/DL (ref 65–99)
HCT VFR BLD AUTO: 40.6 % (ref 34.8–46.1)
HGB BLD-MCNC: 12.7 G/DL (ref 11.5–15.4)
IGA SERPL-MCNC: 214 MG/DL (ref 66–433)
IGG SERPL-MCNC: 1475 MG/DL (ref 635–1741)
IGM SERPL-MCNC: 23 MG/DL (ref 45–281)
IMM GRANULOCYTES # BLD AUTO: 0.03 THOUSAND/UL (ref 0–0.2)
IMM GRANULOCYTES NFR BLD AUTO: 0 % (ref 0–2)
IRON SATN MFR SERPL: 25 % (ref 15–50)
IRON SERPL-MCNC: 80 UG/DL (ref 50–212)
LYMPHOCYTES # BLD AUTO: 1.61 THOUSANDS/ÂΜL (ref 0.6–4.47)
LYMPHOCYTES NFR BLD AUTO: 18 % (ref 14–44)
MCH RBC QN AUTO: 31.4 PG (ref 26.8–34.3)
MCHC RBC AUTO-ENTMCNC: 31.3 G/DL (ref 31.4–37.4)
MCV RBC AUTO: 100 FL (ref 82–98)
MONOCYTES # BLD AUTO: 0.72 THOUSAND/ÂΜL (ref 0.17–1.22)
MONOCYTES NFR BLD AUTO: 8 % (ref 4–12)
NEUTROPHILS # BLD AUTO: 6.67 THOUSANDS/ÂΜL (ref 1.85–7.62)
NEUTS SEG NFR BLD AUTO: 72 % (ref 43–75)
NRBC BLD AUTO-RTO: 0 /100 WBCS
PLATELET # BLD AUTO: 234 THOUSANDS/UL (ref 149–390)
PMV BLD AUTO: 11 FL (ref 8.9–12.7)
POTASSIUM SERPL-SCNC: 4.2 MMOL/L (ref 3.5–5.3)
PROT SERPL-MCNC: 7.4 G/DL (ref 6.4–8.4)
RBC # BLD AUTO: 4.05 MILLION/UL (ref 3.81–5.12)
SODIUM SERPL-SCNC: 136 MMOL/L (ref 135–147)
TIBC SERPL-MCNC: 321 UG/DL (ref 250–450)
UIBC SERPL-MCNC: 241 UG/DL (ref 155–355)
VIT B12 SERPL-MCNC: 703 PG/ML (ref 180–914)
WBC # BLD AUTO: 9.16 THOUSAND/UL (ref 4.31–10.16)

## 2024-06-15 PROCEDURE — 83550 IRON BINDING TEST: CPT

## 2024-06-15 PROCEDURE — 83521 IG LIGHT CHAINS FREE EACH: CPT

## 2024-06-15 PROCEDURE — 82784 ASSAY IGA/IGD/IGG/IGM EACH: CPT

## 2024-06-15 PROCEDURE — 82746 ASSAY OF FOLIC ACID SERUM: CPT

## 2024-06-15 PROCEDURE — 82728 ASSAY OF FERRITIN: CPT

## 2024-06-15 PROCEDURE — 80053 COMPREHEN METABOLIC PANEL: CPT

## 2024-06-15 PROCEDURE — 36415 COLL VENOUS BLD VENIPUNCTURE: CPT

## 2024-06-15 PROCEDURE — 85025 COMPLETE CBC W/AUTO DIFF WBC: CPT

## 2024-06-15 PROCEDURE — 84165 PROTEIN E-PHORESIS SERUM: CPT

## 2024-06-15 PROCEDURE — 82607 VITAMIN B-12: CPT

## 2024-06-15 PROCEDURE — 86334 IMMUNOFIX E-PHORESIS SERUM: CPT

## 2024-06-15 PROCEDURE — 82668 ASSAY OF ERYTHROPOIETIN: CPT

## 2024-06-15 PROCEDURE — 83540 ASSAY OF IRON: CPT

## 2024-06-18 LAB
ALBUMIN SERPL ELPH-MCNC: 3.73 G/DL (ref 3.2–5.1)
ALBUMIN SERPL ELPH-MCNC: 52.6 % (ref 48–70)
ALPHA1 GLOB SERPL ELPH-MCNC: 0.34 G/DL (ref 0.15–0.47)
ALPHA1 GLOB SERPL ELPH-MCNC: 4.8 % (ref 1.8–7)
ALPHA2 GLOB SERPL ELPH-MCNC: 0.84 G/DL (ref 0.42–1.04)
ALPHA2 GLOB SERPL ELPH-MCNC: 11.9 % (ref 5.9–14.9)
BETA GLOB ABNORMAL SERPL ELPH-MCNC: 0.39 G/DL (ref 0.31–0.57)
BETA1 GLOB SERPL ELPH-MCNC: 5.5 % (ref 4.7–7.7)
BETA2 GLOB SERPL ELPH-MCNC: 5.2 % (ref 3.1–7.9)
BETA2+GAMMA GLOB SERPL ELPH-MCNC: 0.37 G/DL (ref 0.2–0.58)
EPO SERPL-ACNC: 25.2 MIU/ML (ref 2.6–18.5)
GAMMA GLOB ABNORMAL SERPL ELPH-MCNC: 1.42 G/DL (ref 0.4–1.66)
GAMMA GLOB SERPL ELPH-MCNC: 20 % (ref 6.9–22.3)
IGG/ALB SER: 1.11 {RATIO} (ref 1.1–1.8)
INTERPRETATION UR IFE-IMP: NORMAL
KAPPA LC FREE SER-MCNC: 51.9 MG/L (ref 3.3–19.4)
KAPPA LC FREE/LAMBDA FREE SER: 1.85 {RATIO} (ref 0.26–1.65)
LAMBDA LC FREE SERPL-MCNC: 28.1 MG/L (ref 5.7–26.3)
PROT PATTERN SERPL ELPH-IMP: NORMAL
PROT SERPL-MCNC: 7.1 G/DL (ref 6.4–8.2)

## 2024-06-18 PROCEDURE — 84165 PROTEIN E-PHORESIS SERUM: CPT | Performed by: STUDENT IN AN ORGANIZED HEALTH CARE EDUCATION/TRAINING PROGRAM

## 2024-06-18 PROCEDURE — 86334 IMMUNOFIX E-PHORESIS SERUM: CPT | Performed by: STUDENT IN AN ORGANIZED HEALTH CARE EDUCATION/TRAINING PROGRAM

## 2024-06-20 ENCOUNTER — PATIENT OUTREACH (OUTPATIENT)
Dept: INTERNAL MEDICINE CLINIC | Facility: CLINIC | Age: 84
End: 2024-06-20

## 2024-06-20 NOTE — PROGRESS NOTES
SW has returned call to pt via  ID # [id number].  Pt shares she has questions re: getting a Reclining chair with a seat lyft.    SW did share that insurance will not cover the chair. Sometimes it may help with the mechanical part of the chair.  SW has directed her to contact her  re same to see if Waiver will fund it.  Pt also asked about getting more PA Waiver hours?  SW reviewed that she just recently informed JULIOCESAR 6/4/24 she was approved for more hours.    SW did shares that the Program does not  cover 24 /  hour care.    JULIOCESAR again recommended she speak to her  re same.     SW to remain available to assist with same.

## 2024-06-21 ENCOUNTER — TELEPHONE (OUTPATIENT)
Dept: OTHER | Facility: OTHER | Age: 84
End: 2024-06-21

## 2024-06-21 NOTE — TELEPHONE ENCOUNTER
Spoke with patient with help from : Edith ID#: [id number]    Patient states that she has been leaving messages for Podiatry to call her back because she needs to reschedule her appointment from 6/10. She states she has not gotten a call back.     Please reach out to patient to assist with rescheduling her appointment for Podiatry     Thank you!

## 2024-06-25 ENCOUNTER — RA CDI HCC (OUTPATIENT)
Dept: OTHER | Facility: HOSPITAL | Age: 84
End: 2024-06-25

## 2024-06-25 NOTE — PROGRESS NOTES
HCC coding opportunities          Chart Reviewed number of suggestions sent to Provider: 1     Patients Insurance     Medicare Insurance: [employer]          I13.0: Hypertensive heart and chronic kidney disease with heart failure and stage 1 through stage 4 chronic kidney disease, or unspecified chronic kidney disease [I13.0]     Per ICD 10 CM coding guidelines the classification presumes a causal relationship between hypertension and heart involvement and between CKD unless the documentation clearly states the conditions are unrelated

## 2024-06-26 ENCOUNTER — CLINICAL SUPPORT (OUTPATIENT)
Dept: INTERNAL MEDICINE CLINIC | Facility: CLINIC | Age: 84
End: 2024-06-26

## 2024-06-26 DIAGNOSIS — E53.8 B12 DEFICIENCY: Primary | Chronic | ICD-10-CM

## 2024-06-26 PROCEDURE — 96372 THER/PROPH/DIAG INJ SC/IM: CPT

## 2024-06-26 RX ADMIN — CYANOCOBALAMIN 1000 MCG: 1000 INJECTION, SOLUTION INTRAMUSCULAR; SUBCUTANEOUS at 11:49

## 2024-06-26 NOTE — PROGRESS NOTES
Patient is here today 06/26/24 for her monthly B12 injection. Injected 1mL into the left deltoid. Patient tolerated well and made aware to return in 1 month for her next B12 injection.    NDC [id number]

## 2024-06-27 ENCOUNTER — OFFICE VISIT (OUTPATIENT)
Dept: HEMATOLOGY ONCOLOGY | Facility: CLINIC | Age: 84
End: 2024-06-27
Payer: MEDICARE

## 2024-06-27 VITALS
RESPIRATION RATE: 17 BRPM | DIASTOLIC BLOOD PRESSURE: 62 MMHG | OXYGEN SATURATION: 98 % | HEIGHT: 63 IN | TEMPERATURE: 97.3 F | HEART RATE: 87 BPM | BODY MASS INDEX: 24.59 KG/M2 | SYSTOLIC BLOOD PRESSURE: 124 MMHG | WEIGHT: 138.8 LBS

## 2024-06-27 DIAGNOSIS — L85.3 XEROSIS CUTIS: ICD-10-CM

## 2024-06-27 DIAGNOSIS — D53.8 OTHER SPECIFIED NUTRITIONAL ANEMIAS: ICD-10-CM

## 2024-06-27 DIAGNOSIS — N18.30 STAGE 3 CHRONIC KIDNEY DISEASE, UNSPECIFIED WHETHER STAGE 3A OR 3B CKD (HCC): Primary | Chronic | ICD-10-CM

## 2024-06-27 DIAGNOSIS — E53.8 B12 DEFICIENCY: Chronic | ICD-10-CM

## 2024-06-27 PROCEDURE — 99214 OFFICE O/P EST MOD 30 MIN: CPT | Performed by: NURSE PRACTITIONER

## 2024-06-27 NOTE — PROGRESS NOTES
[facility] HEMATOLOGY ONCOLOGY SPECIALISTS [place]  [address] [address] [address]  [phone]  Hematology Ambulatory Follow-Up  Ade Moon, 1940, [id number]  6/27/2024    Assessment/Plan:    1. Stage 3 chronic kidney disease, unspecified whether stage 3a or 3b CKD (HCC)  2. B12 deficiency  3. 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  Patient was found to have elevated kappa and lambda light chain ratio. We discussed abnormal light chains are likely secondary to CKD given negative previous SPEP.    Labs from 6/15/2024 show a fairly normal CBC and differential.  MCV is slightly elevated at 100, normal RBC indices otherwise.  CMP shows creatinine of 1.31, glucose 101, calcium 9.5, protein 7.4, EGFR 37.  Quantitative immunoglobulins shows a decreased IgM at 23 otherwise normal.  SPEP showed no monoclonal proteins.  Both kappa and lambda light chains are elevated with a ratio of 1.85.  I suspect elevated light chains is secondary to kidney dysfunction.  Patient does not follow with nephrology, I recommend she be referred for management of kidney dysfunction.  We discussed continuing to follow versus discharge back to PCP.  Patient and her son wish to follow with PCP and will discuss referral to nephrology.  We will therefore see her on a as needed basis. Patient and her son verbalized understanding and is in agreement to the plan. Patient knows to call the Hematology/Oncology office with any questions and concerns regarding the above.    Barrier(s) to care: None  The patient is able to self care.    Constance HUITRON  Medical Oncology/Hematology  [facility]    Interval history: clinically stable    Review of Systems   Constitutional:  Negative for activity change, appetite change, fatigue, fever and unexpected weight change.   Respiratory:  Negative for cough and shortness of breath.    Cardiovascular:  Negative for chest pain and leg swelling.   Gastrointestinal:  Negative for abdominal pain, constipation, diarrhea and nausea.   Endocrine: Negative for cold intolerance and heat intolerance.   Musculoskeletal:  Negative for arthralgias and myalgias.   Skin: Negative.    Neurological:  Negative for dizziness, weakness and headaches.   Hematological:  Negative for adenopathy. Does not bruise/bleed easily.     Patient Active Problem List   Diagnosis    Other chest pain    Essential hypertension    Hyperlipidemia LDL goal <100    Stage 3 chronic kidney disease (HCC)    Osteoarthritis of both wrists    Chronic atrial fibrillation (HCC)    Tubulovillous adenoma    Gastroesophageal reflux disease without esophagitis    Pacemaker    B12 deficiency    Allergic rhinitis due to pollen    Cavus deformity of foot    Midline cystocele    Hallux abducto valgus, bilateral    Left renal artery stenosis (HCC)    Osteoarthritis of hip    Osteopenia    Pain due to onychomycosis of toenails of both feet    Left atrial enlargement    Lipoma of right upper extremity    Diverticulosis of colon    Abnormal nuclear stress test    Renal insufficiency    History of CVA (cerebrovascular accident)    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)    Anemia    At risk for venous thromboembolism (VTE)    DARRIUS (acute kidney injury) (HCC)    Spastic hemiparesis of left dominant side as late effect of cerebral infarction (HCC)    Valvular heart disease    CAD (coronary artery disease)    Urinary retention    Neck pain    Adjustment disorder with mixed emotional features    Thrush    At risk for delirium    Transient neurological symptoms    Belching    Pulmonary hypertension  (HCC)    RICH (obstructive sleep apnea)    Foot drop, left foot    Dyspnea    Complex sleep apnea syndrome    At high risk for falls    (HFpEF) heart failure with preserved ejection fraction (HCC)     Past Medical History:   Diagnosis Date    A-fib (HCC)     Anemia     Arthritis     Breast pain, right     Chest pain on breathing     Colon polyp     Cough     Diverticulosis     Encounter for screening colonoscopy     H/O sick sinus syndrome     Heart murmur     High cholesterol     History of atrial fibrillation     Rate ontrolled. On xarelto 15mg (creatinine 50) Followed by Dr. Randolph with Cardiology     History of weight loss     Report loose fitting clothes. Weight stable (3lbs difference). Last colo showed small tubular adenoma x2. Breast biopsy last showed fibrocystic changes. TSH wnl. Will check cbc, bmp, spep.        Hx of long term use of blood thinners     Hypertension     Irregular heart beat     RICH (obstructive sleep apnea)     Pacemaker     Preop examination     Shortness of breath     Viral bronchitis      Past Surgical History:   Procedure Laterality Date    BREAST BIOPSY Right 08/17/2006    ultrasound guided Percutaneous Needle Core -Benign    CATARACT EXTRACTION      CATARACT EXTRACTION W/ INTRAOCULAR LENS  IMPLANT, BILATERAL      COLONOSCOPY      COLONOSCOPY N/A 05/22/2018    Procedure: COLONOSCOPY;  Surgeon: Jenn Jang DO;  [facility];  Service: Gastroenterology    INSERT / REPLACE / REMOVE PACEMAKER      LEG SURGERY Right     as a child after a fall    MAMMO STEREOTACTIC BREAST BIOPSY RIGHT (ALL INC) Right 10/2014    benign    MN CMBND ANTERPOST COLPORRAPHY W/CYSTO N/A 03/17/2016    Procedure: COLPORRHAPHY ANTERIOR POSTERIOR ;  Surgeon: Dario Braden MD;  [facility];  Service: Gynecology    MN COLONOSCOPY FLX DX W/COLLJ SPEC WHEN PFRMD N/A 04/04/2019    Procedure: COLONOSCOPY;  Surgeon: Jenn Jang DO;  [facility];  Service: Gastroenterology    MN  COLPOPEXY VAGINAL EXTRAPERITONEAL APPROACH N/A 03/17/2016    Procedure: COLPOPEXY VAGINAL EXTRAPERITONEAL (VEC) ANTERIOR ;  Surgeon: Dario Braden MD;  [facility];  Service: Gynecology    OH CYSTOURETHROSCOPY N/A 03/17/2016    Procedure: CYSTOSCOPY;  Surgeon: Dario Braden MD;  [facility];  Service: Gynecology    OH ESOPHAGOGASTRODUODENOSCOPY TRANSORAL DIAGNOSTIC N/A 04/16/2018    Procedure: EGD AND COLONOSCOPY;  Surgeon: Jenn Jang DO;  [facility];  Service: Gastroenterology    OH LAPAROSCOPY COLECTOMY PARTIAL W/ANASTOMOSIS Right 01/13/2022    Procedure: Diagnostic laparoscopy, laparscopic right colectomy;  Surgeon: Andriy Guevara MD;  [facility];  Service: Colorectal    OH SLING OPERATION STRESS INCONTINENCE N/A 03/17/2016    Procedure: INSERTION PUBOVAGINAL SLING SINGLE INCISION ;  Surgeon: Dario Braden MD;  [facility];  Service: Gynecology     Current Outpatient Medications:     acetaminophen (TYLENOL) 325 mg tablet, Take 2 tablets (650 mg total) by mouth every 6 (six) hours as needed for mild pain, Disp: , Rfl: 0    ammonium lactate (LAC-HYDRIN) 12 % cream, APPLY TOPICALLY AS NEEDED FOR DRY SKIN (Patient taking differently: Apply 1 Application topically as needed for dry skin uses lotion BLE), Disp: 385 g, Rfl: 3    cyanocobalamin 1,000 mcg/mL, Inject 1 mL (1,000 mcg total) into a muscle every 30 (thirty) days, Disp: 3 mL, Rfl: 0    Empagliflozin (Jardiance) 10 MG TABS tablet, Take 1 tablet (10 mg total) by mouth every morning, Disp: 90 tablet, Rfl: 5    furosemide (LASIX) 20 mg tablet, Take 1 tablet (20 mg total) by mouth daily patient taking 2 tablets by mouth daily, Disp: 90 tablet, Rfl: 5    gabapentin (NEURONTIN) 100 mg capsule, TAKE 1 CAPSULE (100 MG TOTAL) BY MOUTH 3 (THREE) TIMES A DAY AS NEEDED (AS NEEDED FOR LEFT FOOT PAIN), Disp: 90 capsule, Rfl: 1    Incontinence Supply Disposable (RA Adult Wipes) MISC, Use 4 (four)  "times a day, Disp: 64 each, Rfl: 10    losartan (COZAAR) 50 mg tablet, Take 1 tablet (50 mg total) by mouth 2 (two) times a day, Disp: 180 tablet, Rfl: 5    nebivolol (BYSTOLIC) 5 mg tablet, TAKE 1 TABLET (5 MG TOTAL) BY MOUTH DAILY, Disp: 90 tablet, Rfl: 3    pantoprazole (PROTONIX) 40 mg tablet, TAKE 1 TABLET (40 MG TOTAL) BY MOUTH DAILY IN THE EARLY MORNING, Disp: 90 tablet, Rfl: 3    Pradaxa 150 MG capsu, SB JUAN ANTONIO CAPSULA POR VIA ORAL DOS VECES AL KATRINA, Disp: 180 capsule, Rfl: 3    rosuvastatin (CRESTOR) 5 mg tablet, TAKE 1 TABLET (5 MG TOTAL) BY MOUTH DAILY, Disp: 90 tablet, Rfl: 3    spironolactone (ALDACTONE) 25 mg tablet, Take 1 tablet (25 mg total) by mouth daily, Disp: 90 tablet, Rfl: 5    verapamil (CALAN-SR) 240 mg CR tablet, TAKE 1 TABLET (240 MG TOTAL) BY MOUTH DAILY AT BEDTIME, Disp: 90 tablet, Rfl: 3    Blood Pressure Monitoring (Blood Pressure Cuff) MISC, Use every other day For HTN (Patient not taking: Reported on 8/15/2023), Disp: 1 each, Rfl: 0    nitroglycerin (NITROSTAT) 0.4 mg SL tablet, Place 1 tablet (0.4 mg total) under the tongue every 5 (five) minutes as needed for chest pain (Patient not taking: Reported on 8/3/2023), Disp: , Rfl: 0    Current Facility-Administered Medications:     cyanocobalamin injection 1,000 mcg, 1,000 mcg, Intramuscular, Q30 Days, Deepak Hemrosillo MD, 1,000 mcg at 06/26/24 1149    Allergies   Allergen Reactions    Other Itching     Bandaids    Tape [Medical Tape] Itching     Objective:  /62 (BP Location: Left arm, Patient Position: Sitting, Cuff Size: Adult)   Pulse 87   Temp (!) 97.3 °F (36.3 °C)   Resp 17   Ht 5' 3\" (1.6 m)   Wt 63 kg (138 lb 12.8 oz)   SpO2 98%   BMI 24.59 kg/m²    Physical Exam  Vitals reviewed.   Constitutional:       Appearance: Normal appearance. She is well-developed.   HENT:      Head: Normocephalic and atraumatic.   Eyes:      Conjunctiva/sclera: Conjunctivae normal.      Pupils: Pupils are equal, round, and reactive to light. "   Pulmonary:      Effort: Pulmonary effort is normal. No respiratory distress.   Musculoskeletal:         General: Normal range of motion.      Cervical back: Normal range of motion.   Lymphadenopathy:      Cervical: No cervical adenopathy.   Skin:     General: Skin is dry.   Neurological:      Mental Status: She is alert and oriented to person, place, and time.   Psychiatric:         Behavior: Behavior normal.     Result Review  Labs:  Lab Results   Component Value Date    WBC 9.16 06/15/2024    HGB 12.7 06/15/2024    HCT 40.6 06/15/2024     (H) 06/15/2024     06/15/2024     Lab Results   Component Value Date     11/25/2015    SODIUM 136 06/15/2024    K 4.2 06/15/2024     06/15/2024    CO2 28 06/15/2024    ANIONGAP 7 11/25/2015    AGAP 6 06/15/2024    BUN 22 06/15/2024    CREATININE 1.31 (H) 06/15/2024    GLUC 88 09/20/2022    GLUF 101 (H) 06/15/2024    CALCIUM 9.5 06/15/2024    AST 15 06/15/2024    ALT 8 06/15/2024    ALKPHOS 70 06/15/2024    PROT 7.8 11/25/2015    TP 7.4 06/15/2024    TP 7.1 06/15/2024    BILITOT 0.76 11/25/2015    TBILI 0.63 06/15/2024    EGFR 37 06/15/2024     Imaging:  No results found.    Please note:  This report has been generated by a voice recognition software system. Therefore there may be syntax, spelling, and/or grammatical errors. Please call if you have any questions.

## 2024-06-27 NOTE — LETTER
June 27, 2024     Deepak Hermosillo MD  [address] [facility] [facility] [address]    Patient: Ade Moon   YOB: 1940   Date of Visit: 6/27/2024       Dear Dr. Hermosillo:    Thank you for referring Ade Moon to me for evaluation. Below are my notes for this consultation.    If you have questions, please do not hesitate to call me. I look forward to following your patient along with you.         Sincerely,        TOMY Johnson        CC: MD Constance Robert CRNP  6/27/2024  1:17 PM  [facility] HEMATOLOGY ONCOLOGY SPECIALISTS [place]  [address] [address]  [phone]  Hematology Ambulatory Follow-Up  Ade Moon, 1940, [id number]  6/27/2024    Assessment/Plan:    1. Stage 3 chronic kidney disease, unspecified whether stage 3a or 3b CKD (HCC)  2. B12 deficiency  3. 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  Patient was found to have elevated kappa and lambda light chain ratio. We discussed abnormal light chains are likely secondary to CKD given negative previous SPEP.    Labs from 6/15/2024 show a fairly normal CBC and differential.  MCV is slightly elevated at 100, normal RBC indices otherwise.  CMP shows creatinine of 1.31, glucose 101, calcium 9.5, protein 7.4, EGFR 37.  Quantitative immunoglobulins shows a decreased IgM at 23 otherwise normal.  SPEP showed no monoclonal proteins.  Both kappa and lambda light chains are elevated with a ratio of 1.85.  I suspect elevated light chains is secondary to kidney dysfunction.  Patient does not follow with nephrology, I recommend she be referred for management of kidney dysfunction.  We discussed  continuing to follow versus discharge back to PCP.  Patient and her son wish to follow with PCP and will discuss referral to nephrology.  We will therefore see her on a as needed basis. Patient and her son verbalized understanding and is in agreement to the plan. Patient knows to call the Hematology/Oncology office with any questions and concerns regarding the above.    Barrier(s) to care: None  The patient is able to self care.    Constance HUITRON  Medical Oncology/Hematology  [facility]    Interval history: clinically stable    Review of Systems   Constitutional:  Negative for activity change, appetite change, fatigue, fever and unexpected weight change.   Respiratory:  Negative for cough and shortness of breath.    Cardiovascular:  Negative for chest pain and leg swelling.   Gastrointestinal:  Negative for abdominal pain, constipation, diarrhea and nausea.   Endocrine: Negative for cold intolerance and heat intolerance.   Musculoskeletal:  Negative for arthralgias and myalgias.   Skin: Negative.    Neurological:  Negative for dizziness, weakness and headaches.   Hematological:  Negative for adenopathy. Does not bruise/bleed easily.     Patient Active Problem List   Diagnosis   • Other chest pain   • Essential hypertension   • Hyperlipidemia LDL goal <100   • Stage 3 chronic kidney disease (HCC)   • Osteoarthritis of both wrists   • Chronic atrial fibrillation (HCC)   • Tubulovillous adenoma   • Gastroesophageal reflux disease without esophagitis   • Pacemaker   • B12 deficiency   • Allergic rhinitis due to pollen   • Cavus deformity of foot   • Midline cystocele   • Hallux abducto valgus, bilateral   • Left renal artery stenosis (HCC)   • Osteoarthritis of hip   • Osteopenia   • Pain due to onychomycosis of toenails of both feet   • Left atrial enlargement   • Lipoma of right upper extremity   • Diverticulosis of colon   • Abnormal nuclear stress test   • Renal insufficiency   • History  of CVA (cerebrovascular accident)   • 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)   • Anemia   • At risk for venous thromboembolism (VTE)   • DARRIUS (acute kidney injury) (HCC)   • Spastic hemiparesis of left dominant side as late effect of cerebral infarction (HCC)   • Valvular heart disease   • CAD (coronary artery disease)   • Urinary retention   • Neck pain   • Adjustment disorder with mixed emotional features   • Thrush   • At risk for delirium   • Transient neurological symptoms   • Belching   • Pulmonary hypertension (HCC)   • RICH (obstructive sleep apnea)   • Foot drop, left foot   • Dyspnea   • Complex sleep apnea syndrome   • At high risk for falls   • (HFpEF) heart failure with preserved ejection fraction (HCC)     Past Medical History:   Diagnosis Date   • A-fib (HCC)    • Anemia    • Arthritis    • Breast pain, right    • Chest pain on breathing    • Colon polyp    • Cough    • Diverticulosis    • Encounter for screening colonoscopy    • H/O sick sinus syndrome    • Heart murmur    • High cholesterol    • History of atrial fibrillation     Rate ontrolled. On xarelto 15mg (creatinine 50) Followed by Dr. Randolph with Cardiology    • History of weight loss     Report loose fitting clothes. Weight stable (3lbs difference). Last colo showed small tubular adenoma x2. Breast biopsy last showed fibrocystic changes. TSH wnl. Will check cbc, bmp, spep.       • Hx of long term use of blood thinners    • Hypertension    • Irregular heart beat    • RICH (obstructive sleep apnea)    • Pacemaker    • Preop examination    • Shortness of breath    • Viral bronchitis      Past Surgical History:   Procedure Laterality Date   • BREAST BIOPSY Right 08/17/2006    ultrasound guided Percutaneous Needle Core -Benign   • CATARACT EXTRACTION     • CATARACT EXTRACTION W/ INTRAOCULAR LENS  IMPLANT, BILATERAL     • COLONOSCOPY     • COLONOSCOPY N/A 05/22/2018    Procedure: COLONOSCOPY;  Surgeon: Jenn Jang DO;   [facility];  Service: Gastroenterology   • INSERT / REPLACE / REMOVE PACEMAKER     • LEG SURGERY Right     as a child after a fall   • MAMMO STEREOTACTIC BREAST BIOPSY RIGHT (ALL INC) Right 10/2014    benign   • DE CMBND ANTERPOST COLPORRAPHY W/CYSTO N/A 03/17/2016    Procedure: COLPORRHAPHY ANTERIOR POSTERIOR ;  Surgeon: Dario Braden MD;  [facility];  Service: Gynecology   • DE COLONOSCOPY FLX DX W/COLLJ SPEC WHEN PFRMD N/A 04/04/2019    Procedure: COLONOSCOPY;  Surgeon: Jenn Jang DO;  [facility];  Service: Gastroenterology   • DE COLPOPEXY VAGINAL EXTRAPERITONEAL APPROACH N/A 03/17/2016    Procedure: COLPOPEXY VAGINAL EXTRAPERITONEAL (VEC) ANTERIOR ;  Surgeon: Dario Braden MD;  [facility];  Service: Gynecology   • DE CYSTOURETHROSCOPY N/A 03/17/2016    Procedure: CYSTOSCOPY;  Surgeon: Dario Braden MD;  [facility];  Service: Gynecology   • DE ESOPHAGOGASTRODUODENOSCOPY TRANSORAL DIAGNOSTIC N/A 04/16/2018    Procedure: EGD AND COLONOSCOPY;  Surgeon: Jenn Jang DO;  [facility];  Service: Gastroenterology   • DE LAPAROSCOPY COLECTOMY PARTIAL W/ANASTOMOSIS Right 01/13/2022    Procedure: Diagnostic laparoscopy, laparscopic right colectomy;  Surgeon: Andriy Guevara MD;  [facility];  Service: Colorectal   • DE SLING OPERATION STRESS INCONTINENCE N/A 03/17/2016    Procedure: INSERTION PUBOVAGINAL SLING SINGLE INCISION ;  Surgeon: Dario Braden MD;  [facility];  Service: Gynecology     Current Outpatient Medications:   •  acetaminophen (TYLENOL) 325 mg tablet, Take 2 tablets (650 mg total) by mouth every 6 (six) hours as needed for mild pain, Disp: , Rfl: 0  •  ammonium lactate (LAC-HYDRIN) 12 % cream, APPLY TOPICALLY AS NEEDED FOR DRY SKIN (Patient taking differently: Apply 1 Application topically as needed for dry skin uses lotion BLE), Disp: 385 g, Rfl: 3  •  cyanocobalamin 1,000 mcg/mL,  Inject 1 mL (1,000 mcg total) into a muscle every 30 (thirty) days, Disp: 3 mL, Rfl: 0  •  Empagliflozin (Jardiance) 10 MG TABS tablet, Take 1 tablet (10 mg total) by mouth every morning, Disp: 90 tablet, Rfl: 5  •  furosemide (LASIX) 20 mg tablet, Take 1 tablet (20 mg total) by mouth daily patient taking 2 tablets by mouth daily, Disp: 90 tablet, Rfl: 5  •  gabapentin (NEURONTIN) 100 mg capsule, TAKE 1 CAPSULE (100 MG TOTAL) BY MOUTH 3 (THREE) TIMES A DAY AS NEEDED (AS NEEDED FOR LEFT FOOT PAIN), Disp: 90 capsule, Rfl: 1  •  Incontinence Supply Disposable (RA Adult Wipes) MISC, Use 4 (four) times a day, Disp: 64 each, Rfl: 10  •  losartan (COZAAR) 50 mg tablet, Take 1 tablet (50 mg total) by mouth 2 (two) times a day, Disp: 180 tablet, Rfl: 5  •  nebivolol (BYSTOLIC) 5 mg tablet, TAKE 1 TABLET (5 MG TOTAL) BY MOUTH DAILY, Disp: 90 tablet, Rfl: 3  •  pantoprazole (PROTONIX) 40 mg tablet, TAKE 1 TABLET (40 MG TOTAL) BY MOUTH DAILY IN THE EARLY MORNING, Disp: 90 tablet, Rfl: 3  •  Pradaxa 150 MG capsu, SB JUAN ANTONIO CAPSULA POR VIA ORAL DOS VECES AL KATRINA, Disp: 180 capsule, Rfl: 3  •  rosuvastatin (CRESTOR) 5 mg tablet, TAKE 1 TABLET (5 MG TOTAL) BY MOUTH DAILY, Disp: 90 tablet, Rfl: 3  •  spironolactone (ALDACTONE) 25 mg tablet, Take 1 tablet (25 mg total) by mouth daily, Disp: 90 tablet, Rfl: 5  •  verapamil (CALAN-SR) 240 mg CR tablet, TAKE 1 TABLET (240 MG TOTAL) BY MOUTH DAILY AT BEDTIME, Disp: 90 tablet, Rfl: 3  •  Blood Pressure Monitoring (Blood Pressure Cuff) MISC, Use every other day For HTN (Patient not taking: Reported on 8/15/2023), Disp: 1 each, Rfl: 0  •  nitroglycerin (NITROSTAT) 0.4 mg SL tablet, Place 1 tablet (0.4 mg total) under the tongue every 5 (five) minutes as needed for chest pain (Patient not taking: Reported on 8/3/2023), Disp: , Rfl: 0    Current Facility-Administered Medications:   •  cyanocobalamin injection 1,000 mcg, 1,000 mcg, Intramuscular, Q30 Days, Deepak Hermosillo MD, 1,000 mcg at  "06/26/24 1149    Allergies   Allergen Reactions   • Other Itching     Bandaids   • Tape [Medical Tape] Itching     Objective:  /62 (BP Location: Left arm, Patient Position: Sitting, Cuff Size: Adult)   Pulse 87   Temp (!) 97.3 °F (36.3 °C)   Resp 17   Ht 5' 3\" (1.6 m)   Wt 63 kg (138 lb 12.8 oz)   SpO2 98%   BMI 24.59 kg/m²    Physical Exam  Vitals reviewed.   Constitutional:       Appearance: Normal appearance. She is well-developed.   HENT:      Head: Normocephalic and atraumatic.   Eyes:      Conjunctiva/sclera: Conjunctivae normal.      Pupils: Pupils are equal, round, and reactive to light.   Pulmonary:      Effort: Pulmonary effort is normal. No respiratory distress.   Musculoskeletal:         General: Normal range of motion.      Cervical back: Normal range of motion.   Lymphadenopathy:      Cervical: No cervical adenopathy.   Skin:     General: Skin is dry.   Neurological:      Mental Status: She is alert and oriented to person, place, and time.   Psychiatric:         Behavior: Behavior normal.     Result Review  Labs:  Lab Results   Component Value Date    WBC 9.16 06/15/2024    HGB 12.7 06/15/2024    HCT 40.6 06/15/2024     (H) 06/15/2024     06/15/2024     Lab Results   Component Value Date     11/25/2015    SODIUM 136 06/15/2024    K 4.2 06/15/2024     06/15/2024    CO2 28 06/15/2024    ANIONGAP 7 11/25/2015    AGAP 6 06/15/2024    BUN 22 06/15/2024    CREATININE 1.31 (H) 06/15/2024    GLUC 88 09/20/2022    GLUF 101 (H) 06/15/2024    CALCIUM 9.5 06/15/2024    AST 15 06/15/2024    ALT 8 06/15/2024    ALKPHOS 70 06/15/2024    PROT 7.8 11/25/2015    TP 7.4 06/15/2024    TP 7.1 06/15/2024    BILITOT 0.76 11/25/2015    TBILI 0.63 06/15/2024    EGFR 37 06/15/2024     Imaging:  No results found.    Please note:  This report has been generated by a voice recognition software system. Therefore there may be syntax, spelling, and/or grammatical errors. Please call if you have " any questions.

## 2024-06-28 RX ORDER — AMMONIUM LACTATE 12 G/100G
CREAM TOPICAL AS NEEDED
Qty: 385 G | Refills: 3 | Status: SHIPPED | OUTPATIENT
Start: 2024-06-28

## 2024-07-03 ENCOUNTER — OFFICE VISIT (OUTPATIENT)
Dept: INTERNAL MEDICINE CLINIC | Facility: CLINIC | Age: 84
End: 2024-07-03

## 2024-07-03 VITALS
OXYGEN SATURATION: 97 % | DIASTOLIC BLOOD PRESSURE: 61 MMHG | HEART RATE: 73 BPM | TEMPERATURE: 97.8 F | SYSTOLIC BLOOD PRESSURE: 176 MMHG | WEIGHT: 140.38 LBS | HEIGHT: 63 IN | BODY MASS INDEX: 24.88 KG/M2

## 2024-07-03 DIAGNOSIS — G47.31 COMPLEX SLEEP APNEA SYNDROME: ICD-10-CM

## 2024-07-03 DIAGNOSIS — I50.32 CHRONIC HEART FAILURE WITH PRESERVED EJECTION FRACTION (HCC): ICD-10-CM

## 2024-07-03 DIAGNOSIS — I10 ESSENTIAL HYPERTENSION: ICD-10-CM

## 2024-07-03 DIAGNOSIS — E53.8 B12 DEFICIENCY: Chronic | ICD-10-CM

## 2024-07-03 DIAGNOSIS — N18.32 STAGE 3B CHRONIC KIDNEY DISEASE (HCC): ICD-10-CM

## 2024-07-03 DIAGNOSIS — Z91.81 AT HIGH RISK FOR FALLS: Primary | ICD-10-CM

## 2024-07-03 PROCEDURE — 3078F DIAST BP <80 MM HG: CPT | Performed by: INTERNAL MEDICINE

## 2024-07-03 PROCEDURE — 3077F SYST BP >= 140 MM HG: CPT | Performed by: INTERNAL MEDICINE

## 2024-07-03 PROCEDURE — 99213 OFFICE O/P EST LOW 20 MIN: CPT | Performed by: INTERNAL MEDICINE

## 2024-07-03 NOTE — ASSESSMENT & PLAN NOTE
75 y/o RHM PMHx prior b/l cerebellar infarcts with no residual deficitds, b/l VA stenosis (s/p R VA stent at [facility] in 2016 on ASA/Plavix), DM2, HLD, HTN, cataracts in both eyes (2012) depression, rheumatoid arthritis presenting from outpatient optho office for right eye vision changes. About 3 weeks ago, patient gradually started noticing that his right eye's lower visual field is seeing dark. Denies HA, weakness, numbness, dizziness. Went to see optho Dr. Dawit Lawrence. who performed formal visual field testing in the office, and documented (paperwork provided by patient) that there is partial inferior altitudinal defect nasally and possible small SN defect. Said no diabetic retinopathy in both eyes. 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 At the time of neurological exam, patient was not complaining of significant visual field loss, and on visual field testing, did not have gross visual field cut.     Of note, neurology had seen patient in 11/2021 for acute vestibular syndrome. Neuroimaging at the time did not show new findings, including MRI brain.  Currently follows with HF.   GDMT: Jardiance, Spirolactone, Losartan  -Lasix 20 daily   75 y/o RHM PMHx prior b/l cerebellar infarcts with no residual deficitds, b/l VA stenosis (s/p R VA stent at [facility] in 2016 on ASA/Plavix), DM2, HLD, HTN, cataracts in both eyes (2012) depression, rheumatoid arthritis presenting from outpatient optho office for right eye vision changes. About 3 weeks ago, patient gradually started noticing that his right eye's lower visual field is seeing dark. Denies HA, weakness, numbness, dizziness. Went to see optho Dr. Dawit Lawrence. who performed formal visual field testing in the office, and documented (paperwork provided by patient) that there is partial inferior altitudinal defect nasally and possible small SN defect. Said no diabetic retinopathy in both eyes. 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 At the time of neurological exam, patient was not complaining of significant visual field loss, and on visual field testing, did not have gross visual field cut.     Of note, neurology had seen patient in 11/2021 for acute vestibular syndrome. Neuroimaging at the time did not show new findings, including MRI brain.     NIHSS:0  preMRS:0

## 2024-07-03 NOTE — PROGRESS NOTES
"[facility]  INTERNAL MEDICINE OFFICE VISIT     PATIENT INFORMATION     Ade Moon   83 y.o. female   MRN: [id number]    ASSESSMENT/PLAN     {Assess/PlanSmartLinks:[id number]}  Schedule a follow-up appointment in ***.    HEALTH MAINTENANCE     Immunization History   Administered Date(s) Administered   • COVID-19 MODERNA VACC 0.5 ML IM 02/19/2021, 05/27/2021   • COVID-19 Pfizer Vac BIVALENT Jermaine-sucrose 12 Yr+ IM 09/09/2022   • INFLUENZA 10/11/2005, 10/24/2006, 10/15/2007, 09/28/2015, 10/06/2016, 10/18/2017, 10/05/2022   • Influenza Quadrivalent Preservative Free 3 years and older IM 10/06/2016   • Influenza Quadrivalent, 6-35 Months IM 10/18/2017   • Influenza, high dose seasonal 0.7 mL 10/12/2018, 09/23/2019, 09/23/2020, 09/22/2021, 10/05/2022, 10/19/2023   • Influenza, seasonal, injectable 09/24/2013, 09/24/2014, 09/28/2015   • Pneumococcal Conjugate 13-Valent 05/31/2016   • Pneumococcal Polysaccharide PPV23 01/01/2001, 12/18/2019   • Tdap 03/12/2014   • Zoster Vaccine Recombinant 03/13/2024     CHIEF COMPLAINT     No chief complaint on file.     HISTORY OF PRESENT ILLNESS     ***    REVIEW OF SYSTEMS     Review of Systems   Constitutional:  Negative for fatigue.   Respiratory:  Negative for shortness of breath.    Cardiovascular:  Positive for leg swelling (Chronic LLE). Negative for chest pain and palpitations.   Musculoskeletal: Negative.    Neurological:  Negative for dizziness, weakness and headaches.     OBJECTIVE     Vitals:    07/03/24 1309   BP: (!) 176/61   BP Location: Left arm   Patient Position: Sitting   Cuff Size: Standard   Pulse: 73   Temp: 97.8 °F (36.6 °C)   TempSrc: Temporal   SpO2: 97%   Weight: 63.7 kg (140 lb 6 oz)   Height: 5' 3\" (1.6 m)     Physical Exam  CURRENT MEDICATIONS     Current Outpatient Medications:   •  acetaminophen (TYLENOL) 325 mg tablet, Take 2 tablets (650 mg total) by mouth every 6 (six) hours as needed for mild pain, Disp: , Rfl: 0  •  " ammonium lactate (LAC-HYDRIN) 12 % cream, APPLY TOPICALLY AS NEEDED FOR DRY SKIN, Disp: 385 g, Rfl: 3  •  Blood Pressure Monitoring (Blood Pressure Cuff) MISC, Use every other day For HTN (Patient not taking: Reported on 8/15/2023), Disp: 1 each, Rfl: 0  •  cyanocobalamin 1,000 mcg/mL, Inject 1 mL (1,000 mcg total) into a muscle every 30 (thirty) days, Disp: 3 mL, Rfl: 0  •  Empagliflozin (Jardiance) 10 MG TABS tablet, Take 1 tablet (10 mg total) by mouth every morning, Disp: 90 tablet, Rfl: 5  •  furosemide (LASIX) 20 mg tablet, Take 1 tablet (20 mg total) by mouth daily patient taking 2 tablets by mouth daily, Disp: 90 tablet, Rfl: 5  •  gabapentin (NEURONTIN) 100 mg capsule, TAKE 1 CAPSULE (100 MG TOTAL) BY MOUTH 3 (THREE) TIMES A DAY AS NEEDED (AS NEEDED FOR LEFT FOOT PAIN), Disp: 90 capsule, Rfl: 1  •  Incontinence Supply Disposable (RA Adult Wipes) MISC, Use 4 (four) times a day, Disp: 64 each, Rfl: 10  •  losartan (COZAAR) 50 mg tablet, Take 1 tablet (50 mg total) by mouth 2 (two) times a day, Disp: 180 tablet, Rfl: 5  •  nebivolol (BYSTOLIC) 5 mg tablet, TAKE 1 TABLET (5 MG TOTAL) BY MOUTH DAILY, Disp: 90 tablet, Rfl: 3  •  nitroglycerin (NITROSTAT) 0.4 mg SL tablet, Place 1 tablet (0.4 mg total) under the tongue every 5 (five) minutes as needed for chest pain (Patient not taking: Reported on 8/3/2023), Disp: , Rfl: 0  •  pantoprazole (PROTONIX) 40 mg tablet, TAKE 1 TABLET (40 MG TOTAL) BY MOUTH DAILY IN THE EARLY MORNING, Disp: 90 tablet, Rfl: 3  •  Pradaxa 150 MG capsu, SB JUAN ANTONIO CAPSULA POR VIA ORAL DOS VECES AL KATRINA, Disp: 180 capsule, Rfl: 3  •  rosuvastatin (CRESTOR) 5 mg tablet, TAKE 1 TABLET (5 MG TOTAL) BY MOUTH DAILY, Disp: 90 tablet, Rfl: 3  •  spironolactone (ALDACTONE) 25 mg tablet, Take 1 tablet (25 mg total) by mouth daily, Disp: 90 tablet, Rfl: 5  •  verapamil (CALAN-SR) 240 mg CR tablet, TAKE 1 TABLET (240 MG TOTAL) BY MOUTH DAILY AT BEDTIME, Disp: 90 tablet, Rfl: 3    Current  Facility-Administered Medications:   •  cyanocobalamin injection 1,000 mcg, 1,000 mcg, Intramuscular, Q30 Days, Deepak Hermosillo MD, 1,000 mcg at 06/26/24 1149    PAST MEDICAL & SURGICAL HISTORY     Past Medical History:   Diagnosis Date   • A-fib (HCC)    • Anemia    • Arthritis    • Breast pain, right    • Chest pain on breathing    • Colon polyp    • Cough    • Diverticulosis    • Encounter for screening colonoscopy    • H/O sick sinus syndrome    • Heart murmur    • High cholesterol    • History of atrial fibrillation     Rate ontrolled. On xarelto 15mg (creatinine 50) Followed by Dr. Randolph with Cardiology    • History of weight loss     Report loose fitting clothes. Weight stable (3lbs difference). Last colo showed small tubular adenoma x2. Breast biopsy last showed fibrocystic changes. TSH wnl. Will check cbc, bmp, spep.       • Hx of long term use of blood thinners    • Hypertension    • Irregular heart beat    • RICH (obstructive sleep apnea)    • Pacemaker    • Preop examination    • Shortness of breath    • Viral bronchitis      Past Surgical History:   Procedure Laterality Date   • BREAST BIOPSY Right 08/17/2006    ultrasound guided Percutaneous Needle Core -Benign   • CATARACT EXTRACTION     • CATARACT EXTRACTION W/ INTRAOCULAR LENS  IMPLANT, BILATERAL     • COLONOSCOPY     • COLONOSCOPY N/A 05/22/2018    Procedure: COLONOSCOPY;  Surgeon: Jenn Jang DO;  [facility];  Service: Gastroenterology   • INSERT / REPLACE / REMOVE PACEMAKER     • LEG SURGERY Right     as a child after a fall   • MAMMO STEREOTACTIC BREAST BIOPSY RIGHT (ALL INC) Right 10/2014    benign   • PA CMBND ANTERPOST COLPORRAPHY W/CYSTO N/A 03/17/2016    Procedure: COLPORRHAPHY ANTERIOR POSTERIOR ;  Surgeon: Dario Braden MD;  [facility];  Service: Gynecology   • PA COLONOSCOPY FLX DX W/COLLJ SPEC WHEN PFRMD N/A 04/04/2019    Procedure: COLONOSCOPY;  Surgeon: Jenn Jang DO;  [facility];   Service: Gastroenterology   • IN COLPOPEXY VAGINAL EXTRAPERITONEAL APPROACH N/A 03/17/2016    Procedure: COLPOPEXY VAGINAL EXTRAPERITONEAL (VEC) ANTERIOR ;  Surgeon: Dario Braden MD;  [facility];  Service: Gynecology   • IN CYSTOURETHROSCOPY N/A 03/17/2016    Procedure: CYSTOSCOPY;  Surgeon: Dario Braden MD;  [facility];  Service: Gynecology   • IN ESOPHAGOGASTRODUODENOSCOPY TRANSORAL DIAGNOSTIC N/A 04/16/2018    Procedure: EGD AND COLONOSCOPY;  Surgeon: Jenn Jang DO;  [facility];  Service: Gastroenterology   • IN LAPAROSCOPY COLECTOMY PARTIAL W/ANASTOMOSIS Right 01/13/2022    Procedure: Diagnostic laparoscopy, laparscopic right colectomy;  Surgeon: Andriy Guevara MD;  [facility]: [facility];  Service: Colorectal   • IN SLING OPERATION STRESS INCONTINENCE N/A 03/17/2016    Procedure: INSERTION PUBOVAGINAL SLING SINGLE INCISION ;  Surgeon: Dario Braden MD;  [facility];  Service: Gynecology     SOCIAL & FAMILY HISTORY     Social History     Socioeconomic History   • Marital status:      Spouse name: Not on file   • Number of children: Not on file   • Years of education: Not on file   • Highest education level: Not on file   Occupational History   • Occupation: retired   Tobacco Use   • Smoking status: Never   • Smokeless tobacco: Never   Vaping Use   • Vaping status: Never Used   Substance and Sexual Activity   • Alcohol use: Never   • Drug use: No   • Sexual activity: Not Currently     Comment:    Other Topics Concern   • Not on file   Social History Narrative    Housing, household, and economic circumstances      Social Determinants of Health     Financial Resource Strain: Low Risk  (3/13/2024)    Overall Financial Resource Strain (CARDIA)    • Difficulty of Paying Living Expenses: Not hard at all   Food Insecurity: No Food Insecurity (3/13/2024)    Hunger Vital Sign    • Worried About Running Out of Food in the Last Year: Never  true    • Ran Out of Food in the Last Year: Never true   Transportation Needs: No Transportation Needs (3/13/2024)    PRAPARE - Transportation    • Lack of Transportation (Medical): No    • Lack of Transportation (Non-Medical): No   Physical Activity: Inactive (4/14/2021)    Exercise Vital Sign    • Days of Exercise per Week: 0 days    • Minutes of Exercise per Session: 0 min   Stress: No Stress Concern Present (4/14/2021)    [place] [employer] - Occupational Stress Questionnaire    • Feeling of Stress : Not at all   Social Connections: Moderately Isolated (4/14/2021)    Social Connection and Isolation Panel [NHANES]    • Frequency of Communication with Friends and Family: More than three times a week    • Frequency of Social Gatherings with Friends and Family: More than three times a week    • Attends Holiness Services: More than 4 times per year    • Active Member of Clubs or Organizations: No    • Attends Club or Organization Meetings: Never    • Marital Status:    Intimate Partner Violence: Not At Risk (4/14/2021)    Humiliation, Afraid, Rape, and Kick questionnaire    • Fear of Current or Ex-Partner: No    • Emotionally Abused: No    • Physically Abused: No    • Sexually Abused: No   Housing Stability: Low Risk  (3/13/2024)    Housing Stability Vital Sign    • Unable to Pay for Housing in the Last Year: No    • Number of Times Moved in the Last Year: 1    • Homeless in the Last Year: No     Social History     Substance and Sexual Activity   Alcohol Use Never       Social History     Substance and Sexual Activity   Drug Use No     Social History     Tobacco Use   Smoking Status Never   Smokeless Tobacco Never     Family History   Problem Relation Age of Onset   • Diabetes Mother    • Breast cancer Sister 53   • No Known Problems Father    • No Known Problems Maternal Grandmother    • No Known Problems Maternal Grandfather    • No Known Problems Paternal Grandmother    • No Known  Problems Paternal Grandfather    • No Known Problems Daughter    • No Known Problems Son    • No Known Problems Sister    • No Known Problems Sister    • No Known Problems Brother    • No Known Problems Brother    • No Known Problems Sister    • No Known Problems Paternal Aunt    • No Known Problems Paternal Aunt    • No Known Problems Paternal Aunt    • No Known Problems Paternal Aunt      ==  Nolberto Hermosillo MD  PGY-3  [facility] Medicine [facility]    [address], [address]  [place] [address]  Office: [phone]  Fax: [phone]

## 2024-07-03 NOTE — ASSESSMENT & PLAN NOTE
Could not tolerate sleep machine in the past. Appt scheduled for 9/2024 with sleep for titration of machine.

## 2024-07-03 NOTE — PROGRESS NOTES
[facility]  INTERNAL MEDICINE OFFICE VISIT     PATIENT INFORMATION     Ade Moon   83 y.o. female   MRN: [id number]    ASSESSMENT/PLAN     Problem List Items Addressed This Visit       Essential hypertension     /70 initially but repeat 110/70. Patient is taking medications as prescribed due to caretaker coming in.   -Continue losartan 50 BID, bystolic, Verapamil, Aldactone          Stage 3b chronic kidney disease (HCC)     Known history of CKD with recent DARRIUS. Additionally K/L ratio of 1.85. Saw hem onc and recommended nephrology referral due to likely immunoglobulin changes due to CKD.     -Nephrology referral placed           Relevant Orders    Ambulatory Referral to Nephrology    B12 deficiency (Chronic)     Continue B12 injections         Complex sleep apnea syndrome     Could not tolerate sleep machine in the past. Appt scheduled for 9/2024 with sleep for titration of machine.         At high risk for falls - Primary     Patient and son inquired about a visiting nurse to draw labs when needed in addition to checking vitals.          Relevant Orders    Referral to Home Health- [facility]    (HFpEF) heart failure with preserved ejection fraction (HCC)     Currently follows with HF.   GDMT: Jardiance, Spirolactone, Losartan  -Lasix 20 daily          Schedule a follow-up appointment in 3 months.    HEALTH MAINTENANCE     Immunization History   Administered Date(s) Administered    COVID-19 MODERNA VACC 0.5 ML IM 02/19/2021, 05/27/2021    COVID-19 Pfizer Vac BIVALENT Jermaine-sucrose 12 Yr+ IM 09/09/2022    INFLUENZA 10/11/2005, 10/24/2006, 10/15/2007, 09/28/2015, 10/06/2016, 10/18/2017, 10/05/2022    Influenza Quadrivalent Preservative Free 3 years and older IM 10/06/2016    Influenza Quadrivalent, 6-35 Months IM 10/18/2017    Influenza, high dose seasonal 0.7 mL 10/12/2018, 09/23/2019, 09/23/2020, 09/22/2021, 10/05/2022, 10/19/2023    Influenza, seasonal, injectable  "09/24/2013, 09/24/2014, 09/28/2015    Pneumococcal Conjugate 13-Valent 05/31/2016    Pneumococcal Polysaccharide PPV23 01/01/2001, 12/18/2019    Tdap 03/12/2014    Zoster Vaccine Recombinant 03/13/2024     CHIEF COMPLAINT     No chief complaint on file.     HISTORY OF PRESENT ILLNESS     Patient is an 83-year-old with extensive past medical history including hypertension, hyperlipidemia, CKD 3, A-fib, CVA, CAD, RICH, pulmonary hypertension, tachybradycardia syndrome (pacemaker placed), heart failure preserved ejection fraction.  Patient is presenting today for management of chronic conditions. Patient is here with son and caretaker. Overall she feels tired, but well. Patient recently had caretaker hours increased which has been helpful. Patient currently follows with Hem/onc and Heart Failure.     Patient today has no complaints apart from chronic LLE swelling previously from her CVA.   REVIEW OF SYSTEMS     Review of Systems   Constitutional:  Positive for fatigue.   Respiratory:  Negative for shortness of breath.    Cardiovascular:  Positive for leg swelling (chronic LLE). Negative for chest pain and palpitations.   Gastrointestinal:  Negative for constipation, diarrhea, nausea and vomiting.   Genitourinary: Negative.    Musculoskeletal: Negative.    Neurological:  Negative for dizziness and headaches.     OBJECTIVE     Vitals:    07/03/24 1309   BP: (!) 176/61   BP Location: Left arm   Patient Position: Sitting   Cuff Size: Standard   Pulse: 73   Temp: 97.8 °F (36.6 °C)   TempSrc: Temporal   SpO2: 97%   Weight: 63.7 kg (140 lb 6 oz)   Height: 5' 3\" (1.6 m)     Physical Exam  Vitals reviewed.   Constitutional:       Appearance: Normal appearance.   HENT:      Head: Normocephalic and atraumatic.      Mouth/Throat:      Mouth: Mucous membranes are moist.      Pharynx: Oropharynx is clear.   Cardiovascular:      Rate and Rhythm: Normal rate and regular rhythm.   Pulmonary:      Effort: Pulmonary effort is normal.      " Breath sounds: Normal breath sounds.   Abdominal:      General: Abdomen is flat. Bowel sounds are normal. There is no distension.      Tenderness: There is no abdominal tenderness.   Musculoskeletal:      Right lower leg: No edema.      Left lower leg: Edema present.   Skin:     General: Skin is warm and dry.   Neurological:      Mental Status: She is alert and oriented to person, place, and time.   Psychiatric:         Mood and Affect: Mood normal.         Behavior: Behavior normal.       CURRENT MEDICATIONS     Current Outpatient Medications:     acetaminophen (TYLENOL) 325 mg tablet, Take 2 tablets (650 mg total) by mouth every 6 (six) hours as needed for mild pain, Disp: , Rfl: 0    ammonium lactate (LAC-HYDRIN) 12 % cream, APPLY TOPICALLY AS NEEDED FOR DRY SKIN, Disp: 385 g, Rfl: 3    Blood Pressure Monitoring (Blood Pressure Cuff) MISC, Use every other day For HTN (Patient not taking: Reported on 8/15/2023), Disp: 1 each, Rfl: 0    cyanocobalamin 1,000 mcg/mL, Inject 1 mL (1,000 mcg total) into a muscle every 30 (thirty) days, Disp: 3 mL, Rfl: 0    Empagliflozin (Jardiance) 10 MG TABS tablet, Take 1 tablet (10 mg total) by mouth every morning, Disp: 90 tablet, Rfl: 5    furosemide (LASIX) 20 mg tablet, Take 1 tablet (20 mg total) by mouth daily patient taking 2 tablets by mouth daily, Disp: 90 tablet, Rfl: 5    gabapentin (NEURONTIN) 100 mg capsule, TAKE 1 CAPSULE (100 MG TOTAL) BY MOUTH 3 (THREE) TIMES A DAY AS NEEDED (AS NEEDED FOR LEFT FOOT PAIN), Disp: 90 capsule, Rfl: 1    Incontinence Supply Disposable (RA Adult Wipes) MISC, Use 4 (four) times a day, Disp: 64 each, Rfl: 10    losartan (COZAAR) 50 mg tablet, Take 1 tablet (50 mg total) by mouth 2 (two) times a day, Disp: 180 tablet, Rfl: 5    nebivolol (BYSTOLIC) 5 mg tablet, TAKE 1 TABLET (5 MG TOTAL) BY MOUTH DAILY, Disp: 90 tablet, Rfl: 3    nitroglycerin (NITROSTAT) 0.4 mg SL tablet, Place 1 tablet (0.4 mg total) under the tongue every 5 (five)  minutes as needed for chest pain (Patient not taking: Reported on 8/3/2023), Disp: , Rfl: 0    pantoprazole (PROTONIX) 40 mg tablet, TAKE 1 TABLET (40 MG TOTAL) BY MOUTH DAILY IN THE EARLY MORNING, Disp: 90 tablet, Rfl: 3    Pradaxa 150 MG capsu, SB JUAN ANTONIO CAPSULA POR VIA ORAL DOS VECES AL KATRINA, Disp: 180 capsule, Rfl: 3    rosuvastatin (CRESTOR) 5 mg tablet, TAKE 1 TABLET (5 MG TOTAL) BY MOUTH DAILY, Disp: 90 tablet, Rfl: 3    spironolactone (ALDACTONE) 25 mg tablet, Take 1 tablet (25 mg total) by mouth daily, Disp: 90 tablet, Rfl: 5    verapamil (CALAN-SR) 240 mg CR tablet, TAKE 1 TABLET (240 MG TOTAL) BY MOUTH DAILY AT BEDTIME, Disp: 90 tablet, Rfl: 3    Current Facility-Administered Medications:     cyanocobalamin injection 1,000 mcg, 1,000 mcg, Intramuscular, Q30 Days, Deepak Hermosillo MD, 1,000 mcg at 06/26/24 1149    PAST MEDICAL & SURGICAL HISTORY     Past Medical History:   Diagnosis Date    A-fib (HCC)     Anemia     Arthritis     Breast pain, right     Chest pain on breathing     Colon polyp     Cough     Diverticulosis     Encounter for screening colonoscopy     H/O sick sinus syndrome     Heart murmur     High cholesterol     History of atrial fibrillation     Rate ontrolled. On xarelto 15mg (creatinine 50) Followed by Dr. Randolph with Cardiology     History of weight loss     Report loose fitting clothes. Weight stable (3lbs difference). Last colo showed small tubular adenoma x2. Breast biopsy last showed fibrocystic changes. TSH wnl. Will check cbc, bmp, spep.        Hx of long term use of blood thinners     Hypertension     Irregular heart beat     RICH (obstructive sleep apnea)     Pacemaker     Preop examination     Shortness of breath     Viral bronchitis      Past Surgical History:   Procedure Laterality Date    BREAST BIOPSY Right 08/17/2006    ultrasound guided Percutaneous Needle Core -Benign    CATARACT EXTRACTION      CATARACT EXTRACTION W/ INTRAOCULAR LENS  IMPLANT, BILATERAL      COLONOSCOPY       COLONOSCOPY N/A 05/22/2018    Procedure: COLONOSCOPY;  Surgeon: Jenn Jang DO;  [facility];  Service: Gastroenterology    INSERT / REPLACE / REMOVE PACEMAKER      LEG SURGERY Right     as a child after a fall    MAMMO STEREOTACTIC BREAST BIOPSY RIGHT (ALL INC) Right 10/2014    benign    NE CMBND ANTERPOST COLPORRAPHY W/CYSTO N/A 03/17/2016    Procedure: COLPORRHAPHY ANTERIOR POSTERIOR ;  Surgeon: Dario Braden MD;  [facility];  Service: Gynecology    NE COLONOSCOPY FLX DX W/COLLJ SPEC WHEN PFRMD N/A 04/04/2019    Procedure: COLONOSCOPY;  Surgeon: Jenn Jang DO;  [facility];  Service: Gastroenterology    NE COLPOPEXY VAGINAL EXTRAPERITONEAL APPROACH N/A 03/17/2016    Procedure: COLPOPEXY VAGINAL EXTRAPERITONEAL (VEC) ANTERIOR ;  Surgeon: Dario Braden MD;  [facility];  Service: Gynecology    NE CYSTOURETHROSCOPY N/A 03/17/2016    Procedure: CYSTOSCOPY;  Surgeon: Dario Braden MD;  [facility];  Service: Gynecology    NE ESOPHAGOGASTRODUODENOSCOPY TRANSORAL DIAGNOSTIC N/A 04/16/2018    Procedure: EGD AND COLONOSCOPY;  Surgeon: Jenn Jang DO;  [facility];  Service: Gastroenterology    NE LAPAROSCOPY COLECTOMY PARTIAL W/ANASTOMOSIS Right 01/13/2022    Procedure: Diagnostic laparoscopy, laparscopic right colectomy;  Surgeon: Andriy Guevara MD;  [facility];  Service: Colorectal    NE SLING OPERATION STRESS INCONTINENCE N/A 03/17/2016    Procedure: INSERTION PUBOVAGINAL SLING SINGLE INCISION ;  Surgeon: Dario Braden MD;  [facility];  Service: Gynecology     SOCIAL & FAMILY HISTORY     Social History     Socioeconomic History    Marital status:      Spouse name: Not on file    Number of children: Not on file    Years of education: Not on file    Highest education level: Not on file   Occupational History    Occupation: retired   Tobacco Use    Smoking status: Never    Smokeless  tobacco: Never   Vaping Use    Vaping status: Never Used   Substance and Sexual Activity    Alcohol use: Never    Drug use: No    Sexual activity: Not Currently     Comment:    Other Topics Concern    Not on file   Social History Narrative    Housing, household, and economic circumstances      Social Determinants of Health     Financial Resource Strain: Low Risk  (3/13/2024)    Overall Financial Resource Strain (CARDIA)     Difficulty of Paying Living Expenses: Not hard at all   Food Insecurity: No Food Insecurity (3/13/2024)    Hunger Vital Sign     Worried About Running Out of Food in the Last Year: Never true     Ran Out of Food in the Last Year: Never true   Transportation Needs: No Transportation Needs (3/13/2024)    PRAPARE - Transportation     Lack of Transportation (Medical): No     Lack of Transportation (Non-Medical): No   Physical Activity: Inactive (4/14/2021)    Exercise Vital Sign     Days of Exercise per Week: 0 days     Minutes of Exercise per Session: 0 min   Stress: No Stress Concern Present (4/14/2021)    [employer] - Occupational Stress Questionnaire     Feeling of Stress : Not at all   Social Connections: Moderately Isolated (4/14/2021)    Social Connection and Isolation Panel [NHANES]     Frequency of Communication with Friends and Family: More than three times a week     Frequency of Social Gatherings with Friends and Family: More than three times a week     Attends Confucianism Services: More than 4 times per year     Active Member of Clubs or Organizations: No     Attends Club or Organization Meetings: Never     Marital Status:    Intimate Partner Violence: Not At Risk (4/14/2021)    Humiliation, Afraid, Rape, and Kick questionnaire     Fear of Current or Ex-Partner: No     Emotionally Abused: No     Physically Abused: No     Sexually Abused: No   Housing Stability: Low Risk  (3/13/2024)    Housing Stability Vital Sign     Unable to Pay for Housing in  the Last Year: No     Number of Times Moved in the Last Year: 1     Homeless in the Last Year: No     Social History     Substance and Sexual Activity   Alcohol Use Never       Social History     Substance and Sexual Activity   Drug Use No     Social History     Tobacco Use   Smoking Status Never   Smokeless Tobacco Never     Family History   Problem Relation Age of Onset    Diabetes Mother     Breast cancer Sister 53    No Known Problems Father     No Known Problems Maternal Grandmother     No Known Problems Maternal Grandfather     No Known Problems Paternal Grandmother     No Known Problems Paternal Grandfather     No Known Problems Daughter     No Known Problems Son     No Known Problems Sister     No Known Problems Sister     No Known Problems Brother     No Known Problems Brother     No Known Problems Sister     No Known Problems Paternal Aunt     No Known Problems Paternal Aunt     No Known Problems Paternal Aunt     No Known Problems Paternal Aunt      ==  Nolberto Hermosillo MD  PGY-3  [facility] Internal Medicine [facility]    [address], [address]  [place] [address]  Office: [phone]  Fax: [phone]

## 2024-07-03 NOTE — ASSESSMENT & PLAN NOTE
Patient and son inquired about a visiting nurse to draw labs when needed in addition to checking vitals.

## 2024-07-03 NOTE — ASSESSMENT & PLAN NOTE
/70 initially but repeat 110/70. Patient is taking medications as prescribed due to caretaker coming in.   -Continue losartan 50 BID, bystolic, Verapamil, Aldactone

## 2024-07-03 NOTE — ASSESSMENT & PLAN NOTE
Known history of CKD with recent DARRIUS. Additionally K/L ratio of 1.85. Saw hem onc and recommended nephrology referral due to likely immunoglobulin changes due to CKD.     -Nephrology referral placed

## 2024-07-08 ENCOUNTER — CONSULT (OUTPATIENT)
Dept: MULTI SPECIALTY CLINIC | Facility: CLINIC | Age: 84
End: 2024-07-08

## 2024-07-08 ENCOUNTER — CLINICAL SUPPORT (OUTPATIENT)
Dept: INTERNAL MEDICINE CLINIC | Facility: CLINIC | Age: 84
End: 2024-07-08

## 2024-07-08 VITALS
DIASTOLIC BLOOD PRESSURE: 75 MMHG | WEIGHT: 141 LBS | HEART RATE: 83 BPM | SYSTOLIC BLOOD PRESSURE: 161 MMHG | TEMPERATURE: 97.5 F | BODY MASS INDEX: 24.98 KG/M2

## 2024-07-08 DIAGNOSIS — M21.372 FOOT DROP, LEFT FOOT: ICD-10-CM

## 2024-07-08 DIAGNOSIS — Z23 ENCOUNTER FOR IMMUNIZATION: Primary | ICD-10-CM

## 2024-07-08 DIAGNOSIS — B35.1 ONYCHOMYCOSIS: Primary | ICD-10-CM

## 2024-07-08 NOTE — PROGRESS NOTES
Podiatry Clinic Visit  Ade Moon 83 y.o. female MRN: [id number]  Encounter: [id number]    Assessment & Plan        Diagnoses and all orders for this visit:    Onychomycosis    Foot drop, left foot    Other orders  -     Nail debridement           Plan:  Patient was examined, evaluated, and treated with all questions and concerns addressed.  Mycotic nails x 10 debrided in length and thickness with removal of all subungual debris using large nail nipper without incident  Encouraged patient to continue to use her  AFO brace for her left foot drop. Encouraged her to follow up with them for adjustments of the brace if it is uncomfortable for her to wear  Encouraged use of more supportive footgear  Encouraged patient to perform daily foot exams at home and maintain tight glycemic control  Patient was re-appointed for 3 months    - Dr. Roberson was available/present for entirety of patient encounter and present for all procedures.        Nail debridement     Date/Time  7/8/2024 1:15 PM     Performed by  Nadeen Gutierrez DPM   Authorized by  Nadeen Gutierrez DPM     Universal Protocol   Consent: Verbal consent obtained.  Risks and benefits: risks, benefits and alternatives were discussed  Consent given by: patient  Patient identity confirmed: verbally with patient     Procedure Details   Procedure Notes: Mycotic nails x 10 debrided to normal length and thickness with removal of all devitalized tissue and subungual debris via large nail nipper  Patient tolerance: patient tolerated the procedure well with no immediate complications               History of Present Illness     HPI: Ade Moon is a 83 y.o. female who presents with complaint of painful mycotic nails x 10. The patient has no further podiatric complaints at this time.    Review of Systems   Constitutional: Negative.    HENT: Negative.    Eyes: Negative.    Respiratory: Negative.    Cardiovascular: Negative.    Gastrointestinal: Negative.     Musculoskeletal: negative  Skin: Mycotic nails x 10  Neurological: History of stroke (Left side).        Historical Information   Past Medical History:   Diagnosis Date    A-fib (HCC)     Anemia     Arthritis     Breast pain, right     Chest pain on breathing     Colon polyp     Cough     Diverticulosis     Encounter for screening colonoscopy     H/O sick sinus syndrome     Heart murmur     High cholesterol     History of atrial fibrillation     Rate ontrolled. On xarelto 15mg (creatinine 50) Followed by Dr. Randolph with Cardiology     History of weight loss     Report loose fitting clothes. Weight stable (3lbs difference). Last colo showed small tubular adenoma x2. Breast biopsy last showed fibrocystic changes. TSH wnl. Will check cbc, bmp, spep.        Hx of long term use of blood thinners     Hypertension     Irregular heart beat     RICH (obstructive sleep apnea)     Pacemaker     Preop examination     Shortness of breath     Viral bronchitis      Past Surgical History:   Procedure Laterality Date    BREAST BIOPSY Right 08/17/2006    ultrasound guided Percutaneous Needle Core -Benign    CATARACT EXTRACTION      CATARACT EXTRACTION W/ INTRAOCULAR LENS  IMPLANT, BILATERAL      COLONOSCOPY      COLONOSCOPY N/A 05/22/2018    Procedure: COLONOSCOPY;  Surgeon: Jenn Jang DO;  [facility];  Service: Gastroenterology    INSERT / REPLACE / REMOVE PACEMAKER      LEG SURGERY Right     as a child after a fall    MAMMO STEREOTACTIC BREAST BIOPSY RIGHT (ALL INC) Right 10/2014    benign    [place] CMBND ANTERPOST COLPORRAPHY W/CYSTO N/A 03/17/2016    Procedure: COLPORRHAPHY ANTERIOR POSTERIOR ;  Surgeon: Dario Braden MD;  [facility];  Service: Gynecology    NY COLONOSCOPY FLX DX W/COLLJ SPEC WHEN PFRMD N/A 04/04/2019    Procedure: COLONOSCOPY;  Surgeon: Jenn Jang DO;  [facility];  Service: Gastroenterology    NY COLPOPEXY VAGINAL EXTRAPERITONEAL APPROACH N/A 03/17/2016     Procedure: COLPOPEXY VAGINAL EXTRAPERITONEAL (VEC) ANTERIOR ;  Surgeon: Dario Braden MD;  [facility];  Service: Gynecology    ME CYSTOURETHROSCOPY N/A 03/17/2016    Procedure: CYSTOSCOPY;  Surgeon: Dario Braden MD;  [facility];  Service: Gynecology    ME ESOPHAGOGASTRODUODENOSCOPY TRANSORAL DIAGNOSTIC N/A 04/16/2018    Procedure: EGD AND COLONOSCOPY;  Surgeon: Jenn Jang DO;  [facility];  Service: Gastroenterology    ME LAPAROSCOPY COLECTOMY PARTIAL W/ANASTOMOSIS Right 01/13/2022    Procedure: Diagnostic laparoscopy, laparscopic right colectomy;  Surgeon: Andriy Guevara MD;  [facility];  Service: Colorectal    ME SLING OPERATION STRESS INCONTINENCE N/A 03/17/2016    Procedure: INSERTION PUBOVAGINAL SLING SINGLE INCISION ;  Surgeon: Dario Braden MD;  [facility];  Service: Gynecology     Social History   Social History     Substance and Sexual Activity   Alcohol Use Never     Social History     Substance and Sexual Activity   Drug Use No     Social History     Tobacco Use   Smoking Status Never   Smokeless Tobacco Never     Family History:   Family History   Problem Relation Age of Onset    Diabetes Mother     Breast cancer Sister 53    No Known Problems Father     No Known Problems Maternal Grandmother     No Known Problems Maternal Grandfather     No Known Problems Paternal Grandmother     No Known Problems Paternal Grandfather     No Known Problems Daughter     No Known Problems Son     No Known Problems Sister     No Known Problems Sister     No Known Problems Brother     No Known Problems Brother     No Known Problems Sister     No Known Problems Paternal Aunt     No Known Problems Paternal Aunt     No Known Problems Paternal Aunt     No Known Problems Paternal Aunt        Meds/Allergies   Not in a hospital admission.  Allergies   Allergen Reactions    Other Itching     Bandaids    Tape [Medical Tape] Itching       Objective      Current Vitals:   Blood Pressure: 161/75 (07/08/24 1315)  Pulse: 83 (07/08/24 1315)  Temperature: 97.5 °F (36.4 °C) (07/08/24 1315)  Temp Source: Temporal (07/08/24 1315)  Weight - Scale: 64 kg (141 lb) (07/08/24 1315)        /75 (BP Location: Left arm, Patient Position: Sitting, Cuff Size: Standard)   Pulse 83   Temp 97.5 °F (36.4 °C) (Temporal)   Wt 64 kg (141 lb)   BMI 24.98 kg/m²       Lower Extremity Exam:    Foot Exam    Musculoskeletal:  MMT is 4/5 to all compartments of the LE B/L, noted foot drop of the LLE with claw toe deformity of digits 2-5. Pain on palpation of nail bed x 10     Vascular:   DP pulses are present bilaterally and PT pulses are present bilaterally., CFT< 3sec to all digits. Pedal hair is Absent. Skin temperature warm to warm B/L. Mild edema to the Left ankle     Dermatological:  Mycotic nails x 10. Nails are elongated, thickened with presence of subungual debris. No open Lesions. Skin of the LE is normal texture, temperature, turgor B/L. Interdigital maceration is not present.        Neurologic:  Gross sensation is intact. Monofilament sensation is diminished. Sharp sensation is  diminished .

## 2024-07-18 ENCOUNTER — TELEPHONE (OUTPATIENT)
Dept: OTHER | Facility: OTHER | Age: 84
End: 2024-07-18

## 2024-08-05 DIAGNOSIS — I10 HYPERTENSION, UNSPECIFIED TYPE: ICD-10-CM

## 2024-08-05 RX ORDER — NEBIVOLOL 5 MG/1
5 TABLET ORAL DAILY
Qty: 90 TABLET | Refills: 3 | Status: SHIPPED | OUTPATIENT
Start: 2024-08-05

## 2024-08-06 ENCOUNTER — TELEPHONE (OUTPATIENT)
Dept: INTERNAL MEDICINE CLINIC | Facility: CLINIC | Age: 84
End: 2024-08-06

## 2024-08-06 NOTE — TELEPHONE ENCOUNTER
Folder Color- Red     Name of Form- [facility] Prescription     Form to be filled out by- Dr. Hermosillo     Form to be Faxed [phone]    Patient made aware of 10 business day policy.

## 2024-08-06 NOTE — PROGRESS NOTES
"Advanced Heart Failure/Pulmonary Hypertension Outpatient Note - Ade Moon 84 y.o. female MRN: [id number]    @ Encounter: [id number]    Assessment:  84 y.o. female Hx per chart p/w HF fu.  I first met patient 8/7/23.    Follows primarily with EP Dr. Canela in past  AF on AC  HFpEF, RV size/fxn wnl  PYP scan was not suggestive of cardiac amyloidosis. Perugini 0.  Cannot get CMR 2/2 her PPM model.  Mild AS, mild AI  PH. +RVOT notching. Significant group II contribution likely.  2023 echo with suggestion of borderline elevated filling pressures, worse on R. Note the maxTRV is measured overly generously. RVSP was borderline elevated by echo.  CVA with L hemiparesis  Tachybrady syndrome s/p S-PPM, VVI pacemaker. Per chart:  \"She had left sided erosion remotely and lead extracted. Paces up to 76 % since they increased bystolic\"  Nonobstructive CAD. Had LHC for borderline stress test, revealed nonobstructive dz.  HTN  CKD  Anemia  Vit B12 deficient and gets injections      I have reviewed all pertinent patient data including but not limited to:    Lab Results   Component Value Date    CREATININE 1.31 (H) 06/15/2024     Lab Results   Component Value Date    K 4.2 06/15/2024     Lab Results   Component Value Date    HGBA1C 5.4 08/13/2022     Lab Results   Component Value Date    SJP5ZOVEWPCL 5.777 (H) 08/30/2023    TSH 1.93 09/14/2022     Lab Results   Component Value Date    LDLCALC 50 08/30/2023     Lab Results   Component Value Date     (H) 08/30/2023      Lab Results   Component Value Date    NTBNP 778 (H) 08/09/2021      Serum immunofixation shows no monoclonal immunoglobulins.   UPEP: no M spike  FLA ratio 2.2 - minimally elevated (normal if CKD; note this FLA )  Remote RF and anti-CCP both wnl      TODAY'S PLAN:     08/07/24  Warm, euvolemic  No new cardiac complaints, feels generally well, better than prior  Still rare sob when talking on phone, no CUEVAS when ambulating  She describes gait instability > " she will fu her PCP for this    Heme suggests abnl FLA ratio related to renal dz and advised renal cx  Appreciate heme recs    Has nephro fu scheduled near term    DARRIUS on CKD  For unclear reasons she did not reduce lasix 40 qd to 20 qd at our last  visit as we had planned  She will do so now - reduce current lasix 40 qd to 20 qd  Fu renal consult  May need further diuretic reduction near term - favor this over cessation of other elements of her cardiac regimen    Cw aldactone 25 qd  Lasix plan above  cw losartan 50 bid > may need future reduction  jardiance 10 qd    On nebivolol 5 qd and verapamil 240 qd since before we met    On AC    On statin    Key info from my prior notes:    No recent PPM interrogations available to me now  Note Prior heavy vpacing burden    Reduce lasix 40 qd to 20 qd now  Advised copious hydration    Heme referral for elevated FLA ratio  Complicated by DARRIUS on possible CKD (though no clear Hx of CKD)  also in setting of her cardiac LVH and (minimal) concern for infiltrative CM, see prior comments    FLA ratio would be abnl if no CKD  Note She does not have significant CKD historically  Given workup to date, Lower suspicion but some mixed signals of infiltrative CM  Workup so far points away from aTTR CA  AL less likely given clinical course, though possible and FLA ratio not well explained as of yet  cannot check CMR 2/2 PPM model  Most past ECGs vpaced  No recent strain imaging    Serum immunofixation shows no monoclonal immunoglobulins.   UPEP: no M spike  FLA ratio 2.2 and 2.7 on repeat - minimally elevated absolute numbers and wnl if has CKD. These FLA ratios done when had DARRIUS  pyp without evidence of cardiac amyloidosis    Dopplers neg for DVT    HR in 70s now, heavy V pacing burden, minimal room for liberalization it seems  Fu EP  Extensive vpacing - monitor symptoms after diuresis and BP control, recheck echo and consider med change vs biv upgrade pending course (complex in her given  age, frailty, past PPM lead extraction issues)    I am meeting patient for the first time today  Warm, mildly vol up on exam  Lasix recently doubled to lasix 20 bid from qd - within past week - making more urine, she feels slightly better now  Her main complaint is fatigue and SOB, sometimes profound, worse over past year since CVA and also after covid vaccine she says  High RV pacing noted  Office and her home SBP consistently in 130-140s, she keeps good log at home    Her symptoms are likely multifactorial    Limited role for RHC/EMB at present time  May reconsider pending Tx as above    Her echo and clinical Hx suggests possible infiltrative CM such as cardiac amyloidosis  Of course she certainly has chronic HTN that can explain similar findings  Will pursue CA workup now - AL screen and pyp ordered  We discussed possible additional questions these tests may raise and potential need for more downstream tests if this leads us to confusion    PPM,  ICD , CRT (if applicable):  Interrogation:  5/2024  MDT-SINGLE CHAMBER PPM / NOT MRI CONDITIONAL   CARELINK TRANSMISSION: SINCE 11/1/23: BATTERY VOLTAGE ADEQUATE (8.2 YRS).  66.1% (>40%/VVIR 60PPM); ALL AVAILABLE LEAD PARAMETERS WITHIN NORMAL LIMITS. NO SIGNIFICANT HIGH RATE EPISODES. EF 65% (2/22/24 ECHO); PATIENT TAKING PRADAXA, VERAPAMIL, BISOPROLOL.     Studies:  I have reviewed all pertinent patient data/labs/imaging where available, including but not limited to the below studies. Selected results may be displayed here but comprehensive listing is omitted for note clarity and can be found in the epic chart.    ECG.    Echo.    Stress.    Cath.    HPI:   83 y.o. female Hx per chart p/w HF fu.  No new CP/SOB/dizziness/palpitations/syncope.  No new fatigue.  No new unintentional weight changes.  No new leg swelling, PND, pillow orthopnea.  No new fevers, chills, cough, nausea, vomiting, diarrhea, dysuria.      Interval History:   As noted in 'plan' section above and  prior epic chart notes.    No new CP/SOB/dizziness/palpitations/syncope.  No new fatigue.  No new unintentional weight changes.  No new leg swelling, PND, pillow orthopnea.  No new fevers, chills, cough, nausea, vomiting, diarrhea, dysuria.    Past Medical History:   Diagnosis Date    A-fib (HCC)     Anemia     Arthritis     Breast pain, right     Chest pain on breathing     Colon polyp     Cough     Diverticulosis     Encounter for screening colonoscopy     H/O sick sinus syndrome     Heart murmur     High cholesterol     History of atrial fibrillation     Rate ontrolled. On xarelto 15mg (creatinine 50) Followed by Dr. Randolph with Cardiology     History of weight loss     Report loose fitting clothes. Weight stable (3lbs difference). Last colo showed small tubular adenoma x2. Breast biopsy last showed fibrocystic changes. TSH wnl. Will check cbc, bmp, spep.        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    Dyspnea 08/07/2023    Foot drop, left foot 03/27/2023    RICH (obstructive sleep apnea)     Pulmonary hypertension (HCC) 11/23/2022    Belching 10/05/2022    Transient neurological symptoms 09/08/2022    Thrush 09/06/2022    At risk for delirium 09/06/2022    Valvular heart disease 08/20/2022    CAD (coronary artery disease) 08/20/2022    Urinary retention 08/20/2022    Neck pain 08/20/2022    Adjustment disorder with mixed emotional features 08/20/2022    Anemia 08/19/2022    At risk for venous thromboembolism (VTE) 08/19/2022    DARRIUS (acute kidney injury) (HCC) 08/19/2022    Spastic hemiparesis of left dominant side as late  effect of cerebral infarction (HCC) 08/19/2022    History of CVA (cerebrovascular accident) 08/12/2022    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) 08/12/2022    Renal insufficiency     Abnormal nuclear stress test 08/20/2021    Diverticulosis of colon 05/20/2019    Pacemaker 02/26/2018    Tubulovillous adenoma 02/09/2018    Gastroesophageal reflux disease without esophagitis 02/09/2018    Other chest pain 12/08/2017    Essential hypertension 12/08/2017    Hyperlipidemia LDL goal <100 12/08/2017    Stage 3b chronic kidney disease (HCC) 12/08/2017    Osteoarthritis of both wrists 12/08/2017    Chronic atrial fibrillation (HCC) 12/08/2017    Cavus deformity of foot 06/27/2017    Hallux abducto valgus, bilateral 06/27/2017    Lipoma of right upper extremity 05/17/2017    Pain due to onychomycosis of toenails of both feet 01/20/2017    Allergic rhinitis due to pollen 10/12/2015    Midline cystocele 12/23/2014    Osteoarthritis of hip 07/17/2014    B12 deficiency 12/06/2013    Osteopenia 11/15/2013    Left renal artery stenosis (HCC) 08/27/2013    Left atrial enlargement 08/27/2013       ROS:  10 point ROS negative except as specified in HPI/interval history    Allergies   Allergen Reactions    Other Itching     Bandaids    Tape [Medical Tape] Itching       Current Outpatient Medications   Medication Instructions    acetaminophen (TYLENOL) 650 mg, Oral, Every 6 hours PRN    ammonium lactate (LAC-HYDRIN) 12 % cream Topical, As needed    Blood Pressure Monitoring (Blood Pressure Cuff) MISC Does not apply, Every other day, For HTN    cyanocobalamin 1,000 mcg, Intramuscular, Every 30 days    Empagliflozin (JARDIANCE) 10 mg, Oral, Every morning    furosemide (LASIX) 20 mg, Oral, Daily, patient taking 2 tablets by mouth daily    gabapentin (NEURONTIN) 100 mg, Oral, 3 times daily PRN    Incontinence Supply Disposable (RA Adult Wipes) MISC Does not apply, 4 times daily    losartan (COZAAR) 50 mg, Oral, 2  times daily    nebivolol (BYSTOLIC) 5 mg, Oral, Daily    nitroglycerin (NITROSTAT) 0.4 mg, Sublingual, Every 5 minutes PRN    pantoprazole (PROTONIX) 40 mg, Oral, Daily (early morning)    Pradaxa 150 MG capsu SB JUAN ANTONIO CAPSULA POR VIA ORAL DOS VECES AL KATRINA    rosuvastatin (CRESTOR) 5 mg, Oral, Daily    spironolactone (ALDACTONE) 25 mg, Oral, Daily    verapamil (CALAN-SR) 240 mg, Oral, Daily at bedtime        Social History     Socioeconomic History    Marital status:      Spouse name: Not on file    Number of children: Not on file    Years of education: Not on file    Highest education level: Not on file   Occupational History    Occupation: retired   Tobacco Use    Smoking status: Never    Smokeless tobacco: Never   Vaping Use    Vaping status: Never Used   Substance and Sexual Activity    Alcohol use: Never    Drug use: No    Sexual activity: Not Currently     Comment:    Other Topics Concern    Not on file   Social History Narrative    Housing, household, and economic circumstances      Social Determinants of Health     Financial Resource Strain: Low Risk  (3/13/2024)    Overall Financial Resource Strain (CARDIA)     Difficulty of Paying Living Expenses: Not hard at all   Food Insecurity: No Food Insecurity (3/13/2024)    Hunger Vital Sign     Worried About Running Out of Food in the Last Year: Never true     Ran Out of Food in the Last Year: Never true   Transportation Needs: No Transportation Needs (3/13/2024)    PRAPARE - Transportation     Lack of Transportation (Medical): No     Lack of Transportation (Non-Medical): No   Physical Activity: Inactive (4/14/2021)    Exercise Vital Sign     Days of Exercise per Week: 0 days     Minutes of Exercise per Session: 0 min   Stress: No Stress Concern Present (4/14/2021)    [employer] [place] [employer] - Occupational Stress Questionnaire     Feeling of Stress : Not at all   Social Connections: Moderately Isolated (4/14/2021)    Social  "Connection and Isolation Panel [NHANES]     Frequency of Communication with Friends and Family: More than three times a week     Frequency of Social Gatherings with Friends and Family: More than three times a week     Attends Yazidism Services: More than 4 times per year     Active Member of Clubs or Organizations: No     Attends Club or Organization Meetings: Never     Marital Status:    Intimate Partner Violence: Not At Risk (4/14/2021)    Humiliation, Afraid, Rape, and Kick questionnaire     Fear of Current or Ex-Partner: No     Emotionally Abused: No     Physically Abused: No     Sexually Abused: No   Housing Stability: Low Risk  (3/13/2024)    Housing Stability Vital Sign     Unable to Pay for Housing in the Last Year: No     Number of Times Moved in the Last Year: 1     Homeless in the Last Year: No       Family History   Problem Relation Age of Onset    Diabetes Mother     Breast cancer Sister 53    No Known Problems Father     No Known Problems Maternal Grandmother     No Known Problems Maternal Grandfather     No Known Problems Paternal Grandmother     No Known Problems Paternal Grandfather     No Known Problems Daughter     No Known Problems Son     No Known Problems Sister     No Known Problems Sister     No Known Problems Brother     No Known Problems Brother     No Known Problems Sister     No Known Problems Paternal Aunt     No Known Problems Paternal Aunt     No Known Problems Paternal Aunt     No Known Problems Paternal Aunt        Physical Exam:  Vitals:    08/07/24 0942   BP: 122/76   BP Location: Left arm   Patient Position: Sitting   Cuff Size: Standard   Pulse: 76   SpO2: 98%   Weight: 61.6 kg (135 lb 11.2 oz)   Height: 5' 3\" (1.6 m)     Constitutional: NAD, non toxic  Ears/nose/mouth/throat: atraumatic  CV: RRR, no JVD  Resp: ctabl  GI: Soft, NTND  MSK: no swollen joints in exposed areas  Extr: trace LE edema L>R (since CVA), warm LE  Pysche: Normal affect  Neuro: appropriate in " conversation  Skin: dry and intact in exposed areas    Labs & Results:    Lab Results   Component Value Date    SODIUM 136 06/15/2024    K 4.2 06/15/2024     06/15/2024    CO2 28 06/15/2024    BUN 22 06/15/2024    CREATININE 1.31 (H) 06/15/2024    GLUC 88 09/20/2022    CALCIUM 9.5 06/15/2024     Lab Results   Component Value Date    WBC 9.16 06/15/2024    HGB 12.7 06/15/2024    HCT 40.6 06/15/2024     (H) 06/15/2024     06/15/2024     Lab Results   Component Value Date     (H) 08/30/2023      Lab Results   Component Value Date    CHOLESTEROL 118 08/30/2023    CHOLESTEROL 100 08/13/2022    CHOLESTEROL 149 08/16/2021     Lab Results   Component Value Date    HDL 52 08/30/2023    HDL 48 (L) 08/13/2022    HDL 62 08/16/2021     Lab Results   Component Value Date    TRIG 78 08/30/2023    TRIG 71 08/13/2022    TRIG 101 08/16/2021     Lab Results   Component Value Date    NONHDLC 52 08/13/2022    NONHDLC 87 08/16/2021    NONHDLC 89 08/18/2020       Counseling / Coordination of Care  Greater than 50% of total time was spent with the patient and / or family counseling and / or coordination of care.  We discussed diagnoses, most recent studies, tests and any changes in treatment plan.    Thank you for the opportunity to participate in the care of this patient.    Shayne Herrmann MD  Attending Physician  Advanced Heart Failure and Transplant Cardiology  [facility]

## 2024-08-07 ENCOUNTER — OFFICE VISIT (OUTPATIENT)
Dept: CARDIOLOGY CLINIC | Facility: CLINIC | Age: 84
End: 2024-08-07
Payer: MEDICARE

## 2024-08-07 VITALS
WEIGHT: 135.7 LBS | HEART RATE: 76 BPM | BODY MASS INDEX: 24.04 KG/M2 | SYSTOLIC BLOOD PRESSURE: 122 MMHG | DIASTOLIC BLOOD PRESSURE: 76 MMHG | HEIGHT: 63 IN | OXYGEN SATURATION: 98 %

## 2024-08-07 DIAGNOSIS — I10 HYPERTENSION, UNSPECIFIED TYPE: ICD-10-CM

## 2024-08-07 DIAGNOSIS — I50.30 HEART FAILURE WITH PRESERVED EJECTION FRACTION, UNSPECIFIED HF CHRONICITY (HCC): Primary | ICD-10-CM

## 2024-08-07 DIAGNOSIS — N18.30 STAGE 3 CHRONIC KIDNEY DISEASE, UNSPECIFIED WHETHER STAGE 3A OR 3B CKD (HCC): ICD-10-CM

## 2024-08-07 PROCEDURE — G2211 COMPLEX E/M VISIT ADD ON: HCPCS | Performed by: STUDENT IN AN ORGANIZED HEALTH CARE EDUCATION/TRAINING PROGRAM

## 2024-08-07 PROCEDURE — 99214 OFFICE O/P EST MOD 30 MIN: CPT | Performed by: STUDENT IN AN ORGANIZED HEALTH CARE EDUCATION/TRAINING PROGRAM

## 2024-08-13 ENCOUNTER — TELEPHONE (OUTPATIENT)
Dept: NEUROLOGY | Facility: CLINIC | Age: 84
End: 2024-08-13

## 2024-08-13 ENCOUNTER — REMOTE DEVICE CLINIC VISIT (OUTPATIENT)
Dept: CARDIOLOGY CLINIC | Facility: CLINIC | Age: 84
End: 2024-08-13
Payer: MEDICARE

## 2024-08-13 DIAGNOSIS — Z95.0 CARDIAC PACEMAKER IN SITU: Primary | ICD-10-CM

## 2024-08-13 PROCEDURE — 93294 REM INTERROG EVL PM/LDLS PM: CPT | Performed by: STUDENT IN AN ORGANIZED HEALTH CARE EDUCATION/TRAINING PROGRAM

## 2024-08-13 PROCEDURE — 93296 REM INTERROG EVL PM/IDS: CPT | Performed by: STUDENT IN AN ORGANIZED HEALTH CARE EDUCATION/TRAINING PROGRAM

## 2024-08-13 NOTE — TELEPHONE ENCOUNTER
Received call from [facility] Jane at . Jane clarifying physician name and NPI that signed recent order. Dr. Wood information provided and in [facility] system for future orders.

## 2024-08-13 NOTE — TELEPHONE ENCOUNTER
Called patient regarding appointment today.  She stated that she does not have transportation to the visit and is not feeling well.  She stated that she will have her son call to reschedule appointment.with Dr Depadua.

## 2024-08-13 NOTE — PROGRESS NOTES
Results for orders placed or performed in visit on 08/13/24   Cardiac EP device report    Narrative    MDT-SINGLE CHAMBER PPM / NOT MRI CONDITIONAL  CARELINK TRANSMISSION: BATTERY VOLTAGE ADEQUATE (8.2 YRS). -67% (>40% VVIR@60PPM). ALL AVAILABLE LEAD PARAMETERS WITHIN NORMAL LIMITS. NO SIGNIFICANT HIGH RATE EPISODES. NORMAL DEVICE FUNCTION. GV

## 2024-08-13 NOTE — TELEPHONE ENCOUNTER
Patient called refill line stating that she is having issues getting transportation to her appointment later and would like someone from the office to call her to see what this appointment is for or if she is able to possibly reschedule. Please review and call patient to discuss.

## 2024-08-14 ENCOUNTER — PATIENT OUTREACH (OUTPATIENT)
Dept: INTERNAL MEDICINE CLINIC | Facility: CLINIC | Age: 84
End: 2024-08-14

## 2024-08-14 ENCOUNTER — CLINICAL SUPPORT (OUTPATIENT)
Dept: INTERNAL MEDICINE CLINIC | Facility: CLINIC | Age: 84
End: 2024-08-14

## 2024-08-14 DIAGNOSIS — E53.8 B12 DEFICIENCY: Primary | Chronic | ICD-10-CM

## 2024-08-14 RX ADMIN — CYANOCOBALAMIN 1000 MCG: 1000 INJECTION, SOLUTION INTRAMUSCULAR; SUBCUTANEOUS at 10:22

## 2024-08-14 NOTE — PROGRESS NOTES
SW received a call from pt who thought SW had reached out to her.  SW had not called her.  Pt did inquire if there was any help to get help for a Deep Cleaning as her MA has told her it is not covered.  Sw has suggested she call our [place] [employer] to see if she can get [place] Assistance which may give her a discount for [facility] Care @ [place].  SW provided her their # and has sent a secure email to then as well.    Patient does not have any further questions, concerns, or other needs at this time.  Patient has my contact # and PCP office # if needed.  Social Work to remain available to assist as indicated.    Please re-consult Social Work if needed.

## 2024-08-14 NOTE — PROGRESS NOTES
Patient is here today 08/14/24 for her monthly B12 injection. Injected 1mL into the right deltoid. Patient tolerated well and made aware to return in 30 days for her next injection.    NDC [id number]

## 2024-08-14 NOTE — TELEPHONE ENCOUNTER
MSW phoned patient at [phone]. Patient stated that her son normally transports her to appts, but that begins works at 2PM. MSW reviewed Appt Desk. Nichol cancelled her 8/13 neurology appt. Next neuro appt is not until 11/15/24 at 9AM in [place]. MSW inquired if patient's son will be able to take her. Ade initially said yes, but then she directed this writer to speak with her son, Donal.     MSW phoned Donal at [phone]. No answer. MSW left a message requesting callback. Awaiting same.

## 2024-08-15 DIAGNOSIS — E53.8 B12 DEFICIENCY: Chronic | ICD-10-CM

## 2024-08-15 RX ORDER — CYANOCOBALAMIN 1000 UG/ML
1000 INJECTION, SOLUTION INTRAMUSCULAR; SUBCUTANEOUS
Qty: 3 ML | Refills: 0 | Status: SHIPPED | OUTPATIENT
Start: 2024-08-15 | End: 2024-11-13

## 2024-08-15 NOTE — TELEPHONE ENCOUNTER
MSW phoned patient's son, Donal, again at [phone] and was able to reach him. Donal confirmed that he will be able to transport patient to the following upcoming appts;    Thurs 9/5 at 1115AM (Sleep appt)  [address] [facility]  *Donal did inquire if an earlier appt time is available but he was directed to call the sleep office to inquire. MSW provided the phone number that office as per the Appt desk    Friday 11/15 at 9AM (Neuro appt)  [address] [place] [address]    MSW will sign off at this time as no transportation assistance is needed.     MSW will be available to patient/family as needed.

## 2024-08-15 NOTE — TELEPHONE ENCOUNTER
MSW phoned patient at [phone]. Patient stated that her son normally transports her to appts, but that begins works at 2PM. MSW reviewed Appt Desk. Nichol cancelled her 8/13 neurology appt. Next neuro appt is not until 11/15/24 at 9AM in [place]. MSW inquired if patient's son will be able to take her. Ade initially said yes, but then she directed this writer to speak with her son, Donal.     MSW phoned Donal at [phone]. No answer. MSW left a message requesting callback. Awaiting same.

## 2024-08-15 NOTE — TELEPHONE ENCOUNTER
MSW attempted to reach patient's son, Donal, again at [phone] to confirm if patient does/does not need transportation assistance as patient reports that her son transports her and her future sleep/neuro appts are in the AM (son begins work at 2PM). No answer. MSW left a message requesting callback ASAP. Awaiting same.

## 2024-08-15 NOTE — TELEPHONE ENCOUNTER
Received call from [facility] requesting refills be addressed on form for incontinent supplies.     Refills circled for 5 and faxed to .

## 2024-08-21 ENCOUNTER — PATIENT OUTREACH (OUTPATIENT)
Dept: INTERNAL MEDICINE CLINIC | Facility: CLINIC | Age: 84
End: 2024-08-21

## 2024-08-21 NOTE — PROGRESS NOTES
SW has returned call to pt via  ID # [id number].    Pt has received the application from our Financial Counselor for [place] [employer] that was sent to her if she wanted to use our [facility].   Pt was not sure why she received it.    SW reviewed it was an effort to get assistance to reduce her cost if she went to [facility].    Pt shares she uses a Private dentist.   SW shares she can disregard the application.  Pt shares that her [employer] will not cover the deep cleaning. SW did suggest the Dentist may offer a payment plan.  Patient does not have any further questions, concerns, or other needs at this time.  Patient has my contact # and PCP office # if needed.  Social Work to remain available to assist as indicated.    Please re-consult Social Work if needed.

## 2024-08-26 DIAGNOSIS — I10 ESSENTIAL HYPERTENSION: ICD-10-CM

## 2024-08-26 RX ORDER — VERAPAMIL HYDROCHLORIDE 240 MG/1
240 TABLET, FILM COATED, EXTENDED RELEASE ORAL
Qty: 90 TABLET | Refills: 3 | Status: SHIPPED | OUTPATIENT
Start: 2024-08-26

## 2024-09-05 ENCOUNTER — HOSPITAL ENCOUNTER (OUTPATIENT)
Dept: SLEEP CENTER | Facility: CLINIC | Age: 84
Discharge: HOME | End: 2024-09-05
Payer: MEDICARE

## 2024-09-05 ENCOUNTER — OFFICE VISIT (OUTPATIENT)
Dept: SLEEP CENTER | Facility: CLINIC | Age: 84
End: 2024-09-05
Payer: MEDICARE

## 2024-09-05 VITALS — BODY MASS INDEX: 24.98 KG/M2 | HEIGHT: 63 IN | WEIGHT: 141 LBS

## 2024-09-05 DIAGNOSIS — I27.20 PULMONARY HYPERTENSION (HCC): ICD-10-CM

## 2024-09-05 DIAGNOSIS — G47.31 COMPLEX SLEEP APNEA SYNDROME: ICD-10-CM

## 2024-09-05 DIAGNOSIS — I48.20 CHRONIC ATRIAL FIBRILLATION (HCC): ICD-10-CM

## 2024-09-05 DIAGNOSIS — Z86.73 HISTORY OF CVA (CEREBROVASCULAR ACCIDENT): ICD-10-CM

## 2024-09-05 DIAGNOSIS — G47.31 COMPLEX SLEEP APNEA SYNDROME: Primary | ICD-10-CM

## 2024-09-05 DIAGNOSIS — M79.672 LEFT FOOT PAIN: ICD-10-CM

## 2024-09-05 PROCEDURE — 95810 POLYSOM 6/> YRS 4/> PARAM: CPT

## 2024-09-05 PROCEDURE — 99213 OFFICE O/P EST LOW 20 MIN: CPT | Performed by: NURSE PRACTITIONER

## 2024-09-05 PROCEDURE — 95810 POLYSOM 6/> YRS 4/> PARAM: CPT | Performed by: PSYCHIATRY & NEUROLOGY

## 2024-09-05 RX ORDER — GABAPENTIN 100 MG/1
100 CAPSULE ORAL 3 TIMES DAILY PRN
Qty: 90 CAPSULE | Refills: 1 | Status: SHIPPED | OUTPATIENT
Start: 2024-09-05

## 2024-09-05 NOTE — PROGRESS NOTES
Progress Note - [facility]   Ade Moon 84 y.o. female   :1940, MRN: [id number]  2024          Follow Up Evaluation / Problem:     Complex Sleep Apnea    ASSESSMENT/PLAN:    Diagnoses and all orders for this visit:    Complex sleep apnea syndrome  -     Diagnostic Sleep Study; Future    Chronic atrial fibrillation (HCC)  -     Diagnostic Sleep Study; Future    Pulmonary hypertension (HCC)  -     Diagnostic Sleep Study; Future        Thank you for the opportunity of participating in the evaluation and care of this patient in the Sleep Clinic at [facility].      HPI: Ade Moon is a 84 y.o. year old female, who presents with her family member, who translates at her request,  with PMHx of complex sleep apnea, HTN, pulmonary HTN, Chronic atrial fibrillation, heart failure with preserved EF, severely dilated atrium who presents for follow up to review compliance with use of ASV.        Current Outpatient Medications:     acetaminophen (TYLENOL) 325 mg tablet, Take 2 tablets (650 mg total) by mouth every 6 (six) hours as needed for mild pain, Disp: , Rfl: 0    ammonium lactate (LAC-HYDRIN) 12 % cream, APPLY TOPICALLY AS NEEDED FOR DRY SKIN, Disp: 385 g, Rfl: 3    cyanocobalamin 1,000 mcg/mL, INJECT 1 ML (1,000 MCG TOTAL) INTO A MUSCLE EVERY 30 (THIRTY) DAYS, Disp: 3 mL, Rfl: 0    Empagliflozin (Jardiance) 10 MG TABS tablet, Take 1 tablet (10 mg total) by mouth every morning, Disp: 90 tablet, Rfl: 5    furosemide (LASIX) 20 mg tablet, Take 1 tablet (20 mg total) by mouth daily patient taking 2 tablets by mouth daily, Disp: 90 tablet, Rfl: 5    gabapentin (NEURONTIN) 100 mg capsule, TAKE 1 CAPSULE (100 MG TOTAL) BY MOUTH 3 (THREE) TIMES A DAY AS NEEDED (AS NEEDED FOR LEFT FOOT PAIN), Disp: 90 capsule, Rfl: 1    Incontinence Supply Disposable (RA Adult Wipes) MISC, Use 4 (four) times a day, Disp: 64 each, Rfl: 10    losartan (COZAAR) 50  mg tablet, Take 1 tablet (50 mg total) by mouth 2 (two) times a day, Disp: 180 tablet, Rfl: 5    nebivolol (BYSTOLIC) 5 mg tablet, TAKE 1 TABLET (5 MG TOTAL) BY MOUTH DAILY, Disp: 90 tablet, Rfl: 3    pantoprazole (PROTONIX) 40 mg tablet, TAKE 1 TABLET (40 MG TOTAL) BY MOUTH DAILY IN THE EARLY MORNING, Disp: 90 tablet, Rfl: 3    Pradaxa 150 MG capsu, SB JUAN ANTONIO CAPSULA POR VIA ORAL DOS VECES AL KATRINA, Disp: 180 capsule, Rfl: 3    rosuvastatin (CRESTOR) 5 mg tablet, TAKE 1 TABLET (5 MG TOTAL) BY MOUTH DAILY, Disp: 90 tablet, Rfl: 3    spironolactone (ALDACTONE) 25 mg tablet, Take 1 tablet (25 mg total) by mouth daily, Disp: 90 tablet, Rfl: 5    verapamil (CALAN-SR) 240 mg CR tablet, TAKE 1 TABLET (240 MG TOTAL) BY MOUTH DAILY AT BEDTIME, Disp: 90 tablet, Rfl: 3    Blood Pressure Monitoring (Blood Pressure Cuff) MISC, Use every other day For HTN (Patient not taking: Reported on 8/15/2023), Disp: 1 each, Rfl: 0    nitroglycerin (NITROSTAT) 0.4 mg SL tablet, Place 1 tablet (0.4 mg total) under the tongue every 5 (five) minutes as needed for chest pain (Patient not taking: Reported on 8/3/2023), Disp: , Rfl: 0    Current Facility-Administered Medications:     cyanocobalamin injection 1,000 mcg, 1,000 mcg, Intramuscular, Q30 Days, Deepak Hermosillo MD, 1,000 mcg at 08/14/24 1022    How likely are you to doze off or fall asleep in the following situations, in contrast to feeling just tired?  Sitting and reading: Moderate chance of dozing  Watching TV: High chance of dozing  Sitting, inactive in a public place (e.g. a theatre or a meeting): Slight chance of dozing  As a passenger in a car for an hour without a break: Would never doze  Lying down to rest in the afternoon when circumstances permit: Would never doze  Sitting and talking to someone: Would never doze  Sitting quietly after a lunch without alcohol: Would never doze  In a car, while stopped for a few minutes in traffic: Would never doze  Total score: 6           "    Vitals:    09/05/24 1051   Weight: 64 kg (141 lb)   Height: 5' 3\" (1.6 m)       Body mass index is 24.98 kg/m².            EPWORTH SLEEPINESS SCORE  Total score: 6      Past History Since Last Sleep Center Visit:   She reports that she did not get a call to receive the ASV equipment since her last visit.  Symptoms are unchanged.      The review of systems and following portions of the patient's history were reviewed and updated as appropriate: allergies, current medications, past family history, past medical history, past social history, past surgical history, and problem list.        OBJECTIVE    Physical Exam:     General Appearance:   Alert, cooperative, no distress, appears stated age         Counseling / Coordination of Care  I have spent a total time of 25 minutes in caring for this patient on the day of the visit/encounter including Instructions for management, Patient and family education, Importance of tx compliance, Risk factor reductions, Impressions, Counseling / Coordination of care, Documenting in the medical record, Reviewing / ordering tests, medicine, procedures  , Obtaining or reviewing history  , and Communicating with other healthcare professionals .    Past medical history, past sleep studies and current symptoms reviewed.  ASV equipment is ordered, however, patient reports that she was not contacted by the [employer] company regarding set up of CPAP equipment.  Notes in [place] reviewed with the sleep office nurse, as well as with the [employer] rep.  It appears that order was canceled by [employer] in [place], however, there are no notes as to a reason for this.  The office nurse will be contacting the manager at [employer] to determine why the patient was not contacted, as planned.    In review of her chart, it is noted that her diagnostic sleep study is more than one year old with date of March 2023.  Medicare requires a diagnostic sleep study within one year of " distribution of PAP equipment.  A repeat diagnostic sleep study has been ordered with plan to expedite.        The following instructions have been given to the patient today:    Patient Instructions    Schedule repeat diagnostic sleep study  Plan follow up with Dr. Perez      Nursing Support:  When:  Monday through Friday 7:00am-5:00pm, except holidays  Where:  Direct phone line is [phone], option 3  If you are having a true emergency, please call 911.  In the event that the line is busy or it is after hours, please leave a voice mail message and we will return your call.  Please speak clearly, leaving your full name, birth date, best number to reach you and the reason for your call.  Medication refills:  We will need the name of the medication, the dosage, the ordering provider, whether you get a 30 day or 90 day refill and the pharmacy name and address.  Medications will be ordered by the providers only.  Nurses cannot call in prescriptions.    To reach the Sleep Disorders Center office staff:  Call [phone], option 1, followed by option 3      TOMY Cohn  [facility]

## 2024-09-05 NOTE — PATIENT INSTRUCTIONS
Schedule repeat diagnostic sleep study  Plan follow up with Dr. Perez      Nursing Support:  When:  Monday through Friday 7:00am-5:00pm, except holidays  Where:  Direct phone line is [phone], option 3  If you are having a true emergency, please call 911.  In the event that the line is busy or it is after hours, please leave a voice mail message and we will return your call.  Please speak clearly, leaving your full name, birth date, best number to reach you and the reason for your call.  Medication refills:  We will need the name of the medication, the dosage, the ordering provider, whether you get a 30 day or 90 day refill and the pharmacy name and address.  Medications will be ordered by the providers only.  Nurses cannot call in prescriptions.    To reach the [facility] Disorders Center office staff:  Call [phone], option 1, followed by option 3

## 2024-09-06 NOTE — PROGRESS NOTES
Sleep Study Documentation    Pre-Sleep Study       Sleep testing procedure explained to patient:YES    Patient napped prior to study:NO    Caffeine:Dayshift worker after 12PM.  Caffeine use:NO    Alcohol:Dayshift workers after 5PM: Alcohol use:NO    Typical day for patient:YES       Study Documentation    Sleep Study Indications: Complex sleep apnea, pulmonary hypertension    Sleep Study: Diagnostic   Snore:Mild  Minimum SaO2 68%  Baseline SaO2 94%      EKG abnormalities: no     EEG abnormalities: no    Were abnormal behaviors in sleep observed:NO    Is Total Sleep Study Recording Time < 2 hours: N/A    Is Total Sleep Study Recording Time > 2 hours but study is incomplete: N/A    Is Total Sleep Study Recording Time 6 hours or more but sleep was not obtained: NO    Patient classification: retired       Post-Sleep Study    Medication used at bedtime or during sleep study:NO    Patient reports time it took to fall asleep:greater than 60 minutes    Patient reports waking up during study:1 to 2 times.  Patient reports returning to sleep without difficulty.    Patient reports sleeping 6 to 8 hours without dreaming.    Does the Patient feel this is a typical night of sleep:better than usual    Patient rated sleepiness: Not sleepy or tired    PAP treatment:no.

## 2024-09-10 ENCOUNTER — CONSULT (OUTPATIENT)
Dept: NEPHROLOGY | Facility: CLINIC | Age: 84
End: 2024-09-10
Payer: MEDICARE

## 2024-09-10 VITALS — BODY MASS INDEX: 24.7 KG/M2 | HEIGHT: 63 IN | WEIGHT: 139.4 LBS

## 2024-09-10 DIAGNOSIS — E87.3 METABOLIC ALKALOSIS: ICD-10-CM

## 2024-09-10 DIAGNOSIS — N17.9 AKI (ACUTE KIDNEY INJURY) (HCC): ICD-10-CM

## 2024-09-10 DIAGNOSIS — I12.9 BENIGN HYPERTENSION WITH CHRONIC KIDNEY DISEASE, STAGE III (HCC): Primary | ICD-10-CM

## 2024-09-10 DIAGNOSIS — N18.30 BENIGN HYPERTENSION WITH CHRONIC KIDNEY DISEASE, STAGE III (HCC): Primary | ICD-10-CM

## 2024-09-10 DIAGNOSIS — I50.32 CHRONIC HEART FAILURE WITH PRESERVED EJECTION FRACTION (HCC): ICD-10-CM

## 2024-09-10 DIAGNOSIS — M89.9 CHRONIC KIDNEY DISEASE-MINERAL BONE DISORDER (CKD-MBD) WITH STAGE 3A CHRONIC KIDNEY DISEASE (HCC): ICD-10-CM

## 2024-09-10 DIAGNOSIS — R60.0 LEG EDEMA: ICD-10-CM

## 2024-09-10 DIAGNOSIS — N18.31 STAGE 3A CHRONIC KIDNEY DISEASE (HCC): ICD-10-CM

## 2024-09-10 DIAGNOSIS — N18.31 CHRONIC KIDNEY DISEASE-MINERAL BONE DISORDER (CKD-MBD) WITH STAGE 3A CHRONIC KIDNEY DISEASE (HCC): ICD-10-CM

## 2024-09-10 DIAGNOSIS — E83.9 CHRONIC KIDNEY DISEASE-MINERAL BONE DISORDER (CKD-MBD) WITH STAGE 3A CHRONIC KIDNEY DISEASE (HCC): ICD-10-CM

## 2024-09-10 PROCEDURE — 99204 OFFICE O/P NEW MOD 45 MIN: CPT | Performed by: STUDENT IN AN ORGANIZED HEALTH CARE EDUCATION/TRAINING PROGRAM

## 2024-09-10 NOTE — ASSESSMENT & PLAN NOTE
Lab Results   Component Value Date    EGFR 37 06/15/2024    EGFR 26 04/23/2024    EGFR 36 11/13/2023    CREATININE 1.31 (H) 06/15/2024    CREATININE 1.78 (H) 04/23/2024    CREATININE 1.33 (H) 11/13/2023     Calcium at goal   Will check phosphorus on next set of labs   No need for calcitriol or phosphorus binders at this time   Will check PTH

## 2024-09-10 NOTE — PATIENT INSTRUCTIONS
Nalini por venir a addison visita. Addison funcion renal no es completamente normal, addison creatinine es 1.3. Juanis electrolitos son normales. Pro favor siga las siguientes recomendaciones.    Dieta baja en jozef     Evite felipe antinflamatorios maye Naprosyn, ibuprofen, Aleve, Advil, Celebrex, Meloxicam (Mobic) etc. Puede felipe Tylenol o acetaminophen.     LE recomiendo hacer ejercicios por los menos 30 minutos 3 veces a la semana    Ecografia y examnes de vince y orina

## 2024-09-10 NOTE — PROGRESS NOTES
Ambulatory Visit  Name: Ade Moon      : 1940      MRN: [id number]  Encounter Provider: Joselyn Reyes Bahamonde, MD  Encounter Date: 9/10/2024   Encounter department: [facility] [place]    Assessment & Plan  Benign hypertension with chronic kidney disease, stage III (HCC)  Lab Results   Component Value Date    EGFR 37 06/15/2024    EGFR 26 2024    EGFR 36 2023    CREATININE 1.31 (H) 06/15/2024    CREATININE 1.78 (H) 2024    CREATININE 1.33 (H) 2023   BP at goal, less than 140/90   Recommend low-sodium diet, 2 g a day  Continue with Aldactone  Continue with losartan 50 mg twice daily  Nebivolol 500 mg daily  Lasix was discontinued by cardiology, agreed to adjust diuretics now the patient is on SGLT2 inhibitors  Monitor BP at home  Advised to maintain a good BP control to prevent progression of CKD          Stage 3a chronic kidney disease ([facility])  Lab Results   Component Value Date    EGFR 37 06/15/2024    EGFR 26 2024    EGFR 36 2023    CREATININE 1.31 (H) 06/15/2024    CREATININE 1.78 (H) 2024    CREATININE 1.33 (H) 2023   Current episode of DARRIUS  Recommend repeat BMP, UACR and urinalysis  Recommend ultrasound to rule out obstruction  Avoid NSAIDs    Orders:    Ambulatory Referral to Nephrology    US kidney and bladder; Future    Phosphorus; Future    PTH, intact; Future    DARRIUS (acute kidney injury) ([facility])  Recommendations:  No UA since , needs to repeat  Renal imaging : Kidney ultrasound order to assess kidney morphology  No indication of dialysis ice time, DARRIUS improving  Fluid in take 50 to 60 ounces a day  May to repeat BMP, UA, UACR to assess kidney function    Orders:    Basic metabolic panel; Future    Albumin / creatinine urine ratio; Future    Urinalysis with microscopic; Future    US kidney and bladder; Future    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 ([facility])  Lab  Results   Component Value Date    EGFR 37 06/15/2024    EGFR 26 04/23/2024    EGFR 36 11/13/2023    CREATININE 1.31 (H) 06/15/2024    CREATININE 1.78 (H) 04/23/2024    CREATININE 1.33 (H) 11/13/2023     Calcium at goal   Will check phosphorus on next set of labs   No need for calcitriol or phosphorus binders at this time   Will check PTH    Chronic heart failure with preserved ejection fraction (HCC)  Wt Readings from Last 3 Encounters:   09/10/24 63.2 kg (139 lb 6.4 oz)   09/05/24 64 kg (141 lb)   08/07/24 61.6 kg (135 lb 11.2 oz)       recommend low-sodium diet  Recommend continue with Aldactone  Agree with holding Lasix at this time, patient is euvolemic  Continue with SGLT2 inhibitors for cardiac protection             Metabolic alkalosis  Metabolic alkalosis likely secondary to vascular contraction in the settings of diuretics   Patient with CHF needs to continue with diuretics  Agree with holding Lasix and continue with Aldactone       Leg edema    Orders:    CompreFit (below knee) 20-30 mmHg  LLE edema   , Low-sodium diet  Continue with Aldactone  Recommend compression stockings, leg elevation    History of Present Illness     Ade Moon is a 84 y.o. female with PMH of CKD G3 a with baseline creatinine 1.1 to 1.2 mg/dL, HFpEF s/p pacemaker, hypertension, CVA, carotid artery stenosis who presents elevated creatinine    History obtained from : patient  Review of Systems   Constitutional:  Negative for activity change and appetite change.   HENT:  Negative for congestion and dental problem.    Eyes:  Negative for discharge.   Respiratory:  Negative for cough and shortness of breath.    Cardiovascular:  Negative for chest pain and leg swelling.   Gastrointestinal:  Negative for abdominal distention and abdominal pain.   Endocrine: Negative for cold intolerance.   Genitourinary:  Negative for dysuria.   Musculoskeletal:  Negative for arthralgias and back pain.   Skin:  Negative for color change and pallor.    Neurological:  Negative for dizziness.   Psychiatric/Behavioral:  Negative for agitation.      Pertinent Medical History   #Non-Oliguric KDIGO DARRIUS stage 1 on CKD G3a   Etiology :likely secondary to hemodynamic changes in the settings of Jardiance, started in November 2023  Current creatinine: 1.3 mg/dL, trending down and close to baseline  Peak creatinine: 1.78 mg/dL      #CKD G3a  Baseline creatinine: 1.1 to 1.2 mg/dL  Etiology: Likely secondary to nephrosclerosis in the settings of cardiovascular disease      #Acid-base Disorder  serum HCO3 28mmol/L  Metabolic alkalosis likely secondary to vascular contraction in the settings of diuretics     #Volume status/hypertension:  Volume: Euvolemic on exam  Blood pressure: Normotensive    #Anemia:  Current hemoglobin: 12.7 mg/dL  At goal    #CHF  Started on SGLT2 inhibitors by cardiology    Medical History Reviewed by provider this encounter:       Past Medical History   Past Medical History:   Diagnosis Date    A-fib (HCC)     Anemia     Arthritis     Breast pain, right     Chest pain on breathing     Colon polyp     Cough     Diverticulosis     Encounter for screening colonoscopy     H/O sick sinus syndrome     Heart murmur     High cholesterol     History of atrial fibrillation     Rate ontrolled. On xarelto 15mg (creatinine 50) Followed by Dr. Randolph with Cardiology     History of weight loss     Report loose fitting clothes. Weight stable (3lbs difference). Last colo showed small tubular adenoma x2. Breast biopsy last showed fibrocystic changes. TSH wnl. Will check cbc, bmp, spep.        Hx of long term use of blood thinners     Hypertension     Irregular heart beat     RICH (obstructive sleep apnea)     Pacemaker     Preop examination     Shortness of breath     Viral bronchitis      Past Surgical History:   Procedure Laterality Date    BREAST BIOPSY Right 08/17/2006    ultrasound guided Percutaneous Needle Core -Benign    CATARACT EXTRACTION      CATARACT EXTRACTION  W/ INTRAOCULAR LENS  IMPLANT, BILATERAL      COLONOSCOPY      COLONOSCOPY N/A 05/22/2018    Procedure: COLONOSCOPY;  Surgeon: Jenn Jang DO;  [facility]: [facility];  Service: Gastroenterology    INSERT / REPLACE / REMOVE PACEMAKER      LEG SURGERY Right     as a child after a fall    MAMMO STEREOTACTIC BREAST BIOPSY RIGHT (ALL INC) Right 10/2014    benign    UT CMBND ANTERPOST COLPORRAPHY W/CYSTO N/A 03/17/2016    Procedure: COLPORRHAPHY ANTERIOR POSTERIOR ;  Surgeon: Dario Braden MD;  [facility]: [facility];  Service: Gynecology    UT COLONOSCOPY FLX DX W/COLLJ SPEC WHEN PFRMD N/A 04/04/2019    Procedure: COLONOSCOPY;  Surgeon: Jenn Jang DO;  [facility]: [facility];  Service: Gastroenterology    UT COLPOPEXY VAGINAL EXTRAPERITONEAL APPROACH N/A 03/17/2016    Procedure: COLPOPEXY VAGINAL EXTRAPERITONEAL (VEC) ANTERIOR ;  Surgeon: Dario Braden MD;  [facility];  Service: Gynecology    UT CYSTOURETHROSCOPY N/A 03/17/2016    Procedure: CYSTOSCOPY;  Surgeon: Dario Braden MD;  [facility];  Service: Gynecology    UT ESOPHAGOGASTRODUODENOSCOPY TRANSORAL DIAGNOSTIC N/A 04/16/2018    Procedure: EGD AND COLONOSCOPY;  Surgeon: Jenn Jang DO;  [facility]: [facility];  Service: Gastroenterology    UT LAPAROSCOPY COLECTOMY PARTIAL W/ANASTOMOSIS Right 01/13/2022    Procedure: Diagnostic laparoscopy, laparscopic right colectomy;  Surgeon: Andriy Guevara MD;  Location: [facility];  Service: Colorectal    UT SLING OPERATION STRESS INCONTINENCE N/A 03/17/2016    Procedure: INSERTION PUBOVAGINAL SLING SINGLE INCISION ;  Surgeon: Dario Braden MD;  [facility];  Service: Gynecology     Family History   Problem Relation Age of Onset    Diabetes Mother     Breast cancer Sister 53    No Known Problems Father     No Known Problems Maternal Grandmother     No Known Problems Maternal Grandfather     No Known Problems Paternal Grandmother     No Known Problems  Paternal Grandfather     No Known Problems Daughter     No Known Problems Son     No Known Problems Sister     No Known Problems Sister     No Known Problems Brother     No Known Problems Brother     No Known Problems Sister     No Known Problems Paternal Aunt     No Known Problems Paternal Aunt     No Known Problems Paternal Aunt     No Known Problems Paternal Aunt      Current Outpatient Medications on File Prior to Visit   Medication Sig Dispense Refill    acetaminophen (TYLENOL) 325 mg tablet Take 2 tablets (650 mg total) by mouth every 6 (six) hours as needed for mild pain  0    ammonium lactate (LAC-HYDRIN) 12 % cream APPLY TOPICALLY AS NEEDED FOR DRY SKIN 385 g 3    cyanocobalamin 1,000 mcg/mL INJECT 1 ML (1,000 MCG TOTAL) INTO A MUSCLE EVERY 30 (THIRTY) DAYS 3 mL 0    Empagliflozin (Jardiance) 10 MG TABS tablet Take 1 tablet (10 mg total) by mouth every morning 90 tablet 5    gabapentin (NEURONTIN) 100 mg capsule TAKE 1 CAPSULE (100 MG TOTAL) BY MOUTH 3 (THREE) TIMES A DAY AS NEEDED (AS NEEDED FOR LEFT FOOT PAIN) 90 capsule 1    Incontinence Supply Disposable (RA Adult Wipes) MISC Use 4 (four) times a day 64 each 10    losartan (COZAAR) 50 mg tablet Take 1 tablet (50 mg total) by mouth 2 (two) times a day 180 tablet 5    nebivolol (BYSTOLIC) 5 mg tablet TAKE 1 TABLET (5 MG TOTAL) BY MOUTH DAILY 90 tablet 3    pantoprazole (PROTONIX) 40 mg tablet TAKE 1 TABLET (40 MG TOTAL) BY MOUTH DAILY IN THE EARLY MORNING 90 tablet 3    Pradaxa 150 MG capsu SB JUAN ANTONIO CAPSULA POR VIA ORAL DOS VECES AL KATRINA 180 capsule 3    rosuvastatin (CRESTOR) 5 mg tablet TAKE 1 TABLET (5 MG TOTAL) BY MOUTH DAILY 90 tablet 3    spironolactone (ALDACTONE) 25 mg tablet Take 1 tablet (25 mg total) by mouth daily 90 tablet 5    verapamil (CALAN-SR) 240 mg CR tablet TAKE 1 TABLET (240 MG TOTAL) BY MOUTH DAILY AT BEDTIME 90 tablet 3    Blood Pressure Monitoring (Blood Pressure Cuff) MISC Use every other day For HTN (Patient not taking: Reported  on 8/15/2023) 1 each 0    nitroglycerin (NITROSTAT) 0.4 mg SL tablet Place 1 tablet (0.4 mg total) under the tongue every 5 (five) minutes as needed for chest pain (Patient not taking: Reported on 8/3/2023)  0    [DISCONTINUED] apixaban (Eliquis) 5 mg Take 1 tablet (5 mg total) by mouth 2 (two) times a day 60 tablet 0    [DISCONTINUED] furosemide (LASIX) 20 mg tablet Take 1 tablet (20 mg total) by mouth daily patient taking 2 tablets by mouth daily (Patient not taking: Reported on 9/10/2024) 90 tablet 5     Current Facility-Administered Medications on File Prior to Visit   Medication Dose Route Frequency Provider Last Rate Last Admin    cyanocobalamin injection 1,000 mcg  1,000 mcg Intramuscular Q30 Days Deepak Hermosillo MD   1,000 mcg at 08/14/24 1022     Allergies   Allergen Reactions    Other Itching     Bandaids    Tape [Medical Tape] Itching      Current Outpatient Medications on File Prior to Visit   Medication Sig Dispense Refill    acetaminophen (TYLENOL) 325 mg tablet Take 2 tablets (650 mg total) by mouth every 6 (six) hours as needed for mild pain  0    ammonium lactate (LAC-HYDRIN) 12 % cream APPLY TOPICALLY AS NEEDED FOR DRY SKIN 385 g 3    cyanocobalamin 1,000 mcg/mL INJECT 1 ML (1,000 MCG TOTAL) INTO A MUSCLE EVERY 30 (THIRTY) DAYS 3 mL 0    Empagliflozin (Jardiance) 10 MG TABS tablet Take 1 tablet (10 mg total) by mouth every morning 90 tablet 5    gabapentin (NEURONTIN) 100 mg capsule TAKE 1 CAPSULE (100 MG TOTAL) BY MOUTH 3 (THREE) TIMES A DAY AS NEEDED (AS NEEDED FOR LEFT FOOT PAIN) 90 capsule 1    Incontinence Supply Disposable (RA Adult Wipes) MISC Use 4 (four) times a day 64 each 10    losartan (COZAAR) 50 mg tablet Take 1 tablet (50 mg total) by mouth 2 (two) times a day 180 tablet 5    nebivolol (BYSTOLIC) 5 mg tablet TAKE 1 TABLET (5 MG TOTAL) BY MOUTH DAILY 90 tablet 3    pantoprazole (PROTONIX) 40 mg tablet TAKE 1 TABLET (40 MG TOTAL) BY MOUTH DAILY IN THE EARLY MORNING 90 tablet 3     "Pradaxa 150 MG capsu SB JUAN ANTONIO CAPSULA POR VIA ORAL DOS VECES AL KATRINA 180 capsule 3    rosuvastatin (CRESTOR) 5 mg tablet TAKE 1 TABLET (5 MG TOTAL) BY MOUTH DAILY 90 tablet 3    spironolactone (ALDACTONE) 25 mg tablet Take 1 tablet (25 mg total) by mouth daily 90 tablet 5    verapamil (CALAN-SR) 240 mg CR tablet TAKE 1 TABLET (240 MG TOTAL) BY MOUTH DAILY AT BEDTIME 90 tablet 3    Blood Pressure Monitoring (Blood Pressure Cuff) MISC Use every other day For HTN (Patient not taking: Reported on 8/15/2023) 1 each 0    nitroglycerin (NITROSTAT) 0.4 mg SL tablet Place 1 tablet (0.4 mg total) under the tongue every 5 (five) minutes as needed for chest pain (Patient not taking: Reported on 8/3/2023)  0    [DISCONTINUED] apixaban (Eliquis) 5 mg Take 1 tablet (5 mg total) by mouth 2 (two) times a day 60 tablet 0    [DISCONTINUED] furosemide (LASIX) 20 mg tablet Take 1 tablet (20 mg total) by mouth daily patient taking 2 tablets by mouth daily (Patient not taking: Reported on 9/10/2024) 90 tablet 5     Current Facility-Administered Medications on File Prior to Visit   Medication Dose Route Frequency Provider Last Rate Last Admin    cyanocobalamin injection 1,000 mcg  1,000 mcg Intramuscular Q30 Days Deepak Hermosillo MD   1,000 mcg at 08/14/24 1022      Social History     Tobacco Use    Smoking status: Never    Smokeless tobacco: Never   Vaping Use    Vaping status: Never Used   Substance and Sexual Activity    Alcohol use: Never    Drug use: No    Sexual activity: Not Currently     Comment:          Objective     Ht 5' 3\" (1.6 m)   Wt 63.2 kg (139 lb 6.4 oz)   BMI 24.69 kg/m²     Physical Exam  General:  no acute distress at this time  Skin:  No acute rash  Eyes:  No scleral icterus and noninjected  ENT:  mucous membranes moist  Neck:  no carotid bruits  Chest:  Clear to auscultation percussion, good respiratory effort, no use of accessory respiratory muscles  CVS:  Regular rate and rhythm without rub   Abdomen:  soft " and nontender   Extremities: no significant lower extremity edema  Neuro:  No gross focality  Psych:  Alert , cooperative

## 2024-09-10 NOTE — ASSESSMENT & PLAN NOTE
Wt Readings from Last 3 Encounters:   09/10/24 63.2 kg (139 lb 6.4 oz)   09/05/24 64 kg (141 lb)   08/07/24 61.6 kg (135 lb 11.2 oz)       recommend low-sodium diet  Recommend continue with Aldactone  Agree with holding Lasix at this time, patient is euvolemic  Continue with SGLT2 inhibitors for cardiac protection

## 2024-09-10 NOTE — ASSESSMENT & PLAN NOTE
Orders:    CompreFit (below knee) 20-30 mmHg  LLE edema   , Low-sodium diet  Continue with Aldactone  Recommend compression stockings, leg elevation

## 2024-09-10 NOTE — ASSESSMENT & PLAN NOTE
Metabolic alkalosis likely secondary to vascular contraction in the settings of diuretics   Patient with CHF needs to continue with diuretics  Agree with holding Lasix and continue with Aldactone

## 2024-09-10 NOTE — ASSESSMENT & PLAN NOTE
Lab Results   Component Value Date    EGFR 37 06/15/2024    EGFR 26 04/23/2024    EGFR 36 11/13/2023    CREATININE 1.31 (H) 06/15/2024    CREATININE 1.78 (H) 04/23/2024    CREATININE 1.33 (H) 11/13/2023   Current episode of DARRIUS  Recommend repeat BMP, UACR and urinalysis  Recommend ultrasound to rule out obstruction  Avoid NSAIDs    Orders:    Ambulatory Referral to Nephrology    US kidney and bladder; Future    Phosphorus; Future    PTH, intact; Future

## 2024-09-10 NOTE — ASSESSMENT & PLAN NOTE
Recommendations:  No UA since 2022, needs to repeat  Renal imaging : Kidney ultrasound order to assess kidney morphology  No indication of dialysis ice time, DARRIUS improving  Fluid in take 50 to 60 ounces a day  May to repeat BMP, UA, UACR to assess kidney function    Orders:    Basic metabolic panel; Future    Albumin / creatinine urine ratio; Future    Urinalysis with microscopic; Future    US kidney and bladder; Future

## 2024-09-10 NOTE — ASSESSMENT & PLAN NOTE
Lab Results   Component Value Date    EGFR 37 06/15/2024    EGFR 26 04/23/2024    EGFR 36 11/13/2023    CREATININE 1.31 (H) 06/15/2024    CREATININE 1.78 (H) 04/23/2024    CREATININE 1.33 (H) 11/13/2023   BP at goal, less than 140/90   Recommend low-sodium diet, 2 g a day  Continue with Aldactone  Continue with losartan 50 mg twice daily  Nebivolol 500 mg daily  Lasix was discontinued by cardiology, agreed to adjust diuretics now the patient is on SGLT2 inhibitors  Monitor BP at home  Advised to maintain a good BP control to prevent progression of CKD

## 2024-09-11 ENCOUNTER — CLINICAL SUPPORT (OUTPATIENT)
Dept: INTERNAL MEDICINE CLINIC | Facility: CLINIC | Age: 84
End: 2024-09-11

## 2024-09-11 DIAGNOSIS — E53.8 B12 DEFICIENCY: Primary | Chronic | ICD-10-CM

## 2024-09-11 PROCEDURE — 96372 THER/PROPH/DIAG INJ SC/IM: CPT

## 2024-09-11 RX ADMIN — CYANOCOBALAMIN 1000 MCG: 1000 INJECTION, SOLUTION INTRAMUSCULAR; SUBCUTANEOUS at 10:21

## 2024-09-11 NOTE — PROGRESS NOTES
Patient came in to clinic to have B-12 injection administered (cyanocobalamin injection 1,000 mcg) to the Left Deltoid, 9/11/24. Patient tolerated well and is aware to return in 30 days.      CYANOCOBALAMMIN,1,000 mcg/ml    LOT# [id number]    EXP: 10/31/25

## 2024-09-12 ENCOUNTER — TELEPHONE (OUTPATIENT)
Dept: SLEEP CENTER | Facility: CLINIC | Age: 84
End: 2024-09-12

## 2024-09-12 DIAGNOSIS — I27.20 PULMONARY HYPERTENSION (HCC): ICD-10-CM

## 2024-09-12 DIAGNOSIS — G47.31 COMPLEX SLEEP APNEA SYNDROME: Primary | ICD-10-CM

## 2024-09-12 DIAGNOSIS — I48.20 CHRONIC ATRIAL FIBRILLATION (HCC): ICD-10-CM

## 2024-09-12 NOTE — TELEPHONE ENCOUNTER
Sleep study resulted and shows severe sleep apnea.  AHI 40.1 with [facility] noted. ASV machine ordered with request to expedite.     ASV script and other clinicals sent to [facility] via [employer] with request to expedite.     Email from Kacey Reid RN to [facility] ([facility]) to please assist in seeing this order is processed for this patient.       Call to patient using  # [id number], [employer], voicemail message left and [employer] message sent with call back number, [phone] option 3, then option 1.

## 2024-09-13 ENCOUNTER — HOSPITAL ENCOUNTER (OUTPATIENT)
Dept: RADIOLOGY | Facility: HOSPITAL | Age: 84
Discharge: HOME | End: 2024-09-13
Attending: STUDENT IN AN ORGANIZED HEALTH CARE EDUCATION/TRAINING PROGRAM
Payer: MEDICARE

## 2024-09-13 DIAGNOSIS — N18.31 STAGE 3A CHRONIC KIDNEY DISEASE (HCC): ICD-10-CM

## 2024-09-13 DIAGNOSIS — N17.9 AKI (ACUTE KIDNEY INJURY) (HCC): ICD-10-CM

## 2024-09-13 PROCEDURE — 76775 US EXAM ABDO BACK WALL LIM: CPT

## 2024-09-13 NOTE — TELEPHONE ENCOUNTER
Per email received from [employer] supervisor Elda Mitchell:  We spoke with the patient, she is scheduled for set up:  Setup Date & Time: 10/1/24 @ 9:00 AM.

## 2024-09-16 ENCOUNTER — TELEPHONE (OUTPATIENT)
Age: 84
End: 2024-09-16

## 2024-09-16 ENCOUNTER — TELEPHONE (OUTPATIENT)
Dept: NEPHROLOGY | Facility: CLINIC | Age: 84
End: 2024-09-16

## 2024-09-16 NOTE — TELEPHONE ENCOUNTER
Called patient and relayed Dr. Reyes message in [place] [employer] . Patient expressed understating. No further questions at this time.

## 2024-09-16 NOTE — TELEPHONE ENCOUNTER
Incoming call from patient.  #[phone] on the line.     New patient - having vaginal spotting that started yesterday 9/15. Last menses when patient was 58 years old. Patient is requesting to be seen at clinic on [facility] in [place]. Patient referring to [facility]. [facility] phone number given to patient.

## 2024-09-16 NOTE — TELEPHONE ENCOUNTER
----- Message from Joselyn Reyes Bahamonde, MD sent at 9/16/2024  3:05 PM EDT -----  Please let patient know that her kidney ultrasound is normal

## 2024-09-18 ENCOUNTER — TELEPHONE (OUTPATIENT)
Dept: INTERNAL MEDICINE CLINIC | Facility: CLINIC | Age: 84
End: 2024-09-18

## 2024-09-18 NOTE — TELEPHONE ENCOUNTER
Spoke with patient on the phone. Interpretor ID [id number]. Introduced self and role. Patient stated starting this past Saturday she noticed bright red bleeding while in the shower. Patient used 2 pads Saturday and Monday, none on Sunday. No other symptoms or complaints noted. Patient contacted OBGYN but the earliest appointment is in November. Patient currently on Pradaxa.    Discussed patient coming in office for evaluation. Patient unable to come in today. Appointment scheduled for 9/19/24 at 1130.     Patient aware to go to ED if symptoms worsen or bleeding is heavier. Patient expressed understanding on the phone.

## 2024-09-19 ENCOUNTER — OFFICE VISIT (OUTPATIENT)
Dept: INTERNAL MEDICINE CLINIC | Facility: CLINIC | Age: 84
End: 2024-09-19

## 2024-09-19 ENCOUNTER — APPOINTMENT (OUTPATIENT)
Dept: LAB | Facility: CLINIC | Age: 84
End: 2024-09-19
Payer: MEDICARE

## 2024-09-19 VITALS
TEMPERATURE: 97.7 F | BODY MASS INDEX: 26.91 KG/M2 | HEIGHT: 61 IN | HEART RATE: 80 BPM | DIASTOLIC BLOOD PRESSURE: 72 MMHG | SYSTOLIC BLOOD PRESSURE: 142 MMHG | OXYGEN SATURATION: 99 % | WEIGHT: 142.5 LBS

## 2024-09-19 DIAGNOSIS — I48.20 CHRONIC ATRIAL FIBRILLATION (HCC): ICD-10-CM

## 2024-09-19 DIAGNOSIS — D51.9 ANEMIA DUE TO VITAMIN B12 DEFICIENCY, UNSPECIFIED B12 DEFICIENCY TYPE: ICD-10-CM

## 2024-09-19 DIAGNOSIS — N18.31 STAGE 3A CHRONIC KIDNEY DISEASE (HCC): ICD-10-CM

## 2024-09-19 DIAGNOSIS — N93.9 VAGINAL BLEEDING, ABNORMAL: Primary | ICD-10-CM

## 2024-09-19 DIAGNOSIS — E53.8 B12 DEFICIENCY: ICD-10-CM

## 2024-09-19 LAB
BASOPHILS # BLD AUTO: 0.04 THOUSANDS/ΜL (ref 0–0.1)
BASOPHILS NFR BLD AUTO: 1 % (ref 0–1)
EOSINOPHIL # BLD AUTO: 0.06 THOUSAND/ΜL (ref 0–0.61)
EOSINOPHIL NFR BLD AUTO: 1 % (ref 0–6)
ERYTHROCYTE [DISTWIDTH] IN BLOOD BY AUTOMATED COUNT: 14 % (ref 11.6–15.1)
HCT VFR BLD AUTO: 42.3 % (ref 34.8–46.1)
HGB BLD-MCNC: 13.3 G/DL (ref 11.5–15.4)
IMM GRANULOCYTES # BLD AUTO: 0.04 THOUSAND/UL (ref 0–0.2)
IMM GRANULOCYTES NFR BLD AUTO: 1 % (ref 0–2)
LYMPHOCYTES # BLD AUTO: 1.32 THOUSANDS/ΜL (ref 0.6–4.47)
LYMPHOCYTES NFR BLD AUTO: 18 % (ref 14–44)
MCH RBC QN AUTO: 31.1 PG (ref 26.8–34.3)
MCHC RBC AUTO-ENTMCNC: 31.4 G/DL (ref 31.4–37.4)
MCV RBC AUTO: 99 FL (ref 82–98)
MONOCYTES # BLD AUTO: 0.63 THOUSAND/ΜL (ref 0.17–1.22)
MONOCYTES NFR BLD AUTO: 8 % (ref 4–12)
NEUTROPHILS # BLD AUTO: 5.47 THOUSANDS/ΜL (ref 1.85–7.62)
NEUTS SEG NFR BLD AUTO: 71 % (ref 43–75)
NRBC BLD AUTO-RTO: 0 /100 WBCS
PHOSPHATE SERPL-MCNC: 3.4 MG/DL (ref 2.3–4.1)
PLATELET # BLD AUTO: 250 THOUSANDS/UL (ref 149–390)
PMV BLD AUTO: 11.3 FL (ref 8.9–12.7)
PTH-INTACT SERPL-MCNC: 48.2 PG/ML (ref 12–88)
RBC # BLD AUTO: 4.28 MILLION/UL (ref 3.81–5.12)
T4 FREE SERPL-MCNC: 0.93 NG/DL (ref 0.61–1.12)
TSH SERPL DL<=0.05 MIU/L-ACNC: 3.88 UIU/ML (ref 0.45–4.5)
WBC # BLD AUTO: 7.56 THOUSAND/UL (ref 4.31–10.16)

## 2024-09-19 PROCEDURE — G2211 COMPLEX E/M VISIT ADD ON: HCPCS | Performed by: STUDENT IN AN ORGANIZED HEALTH CARE EDUCATION/TRAINING PROGRAM

## 2024-09-19 PROCEDURE — 84100 ASSAY OF PHOSPHORUS: CPT

## 2024-09-19 PROCEDURE — 99213 OFFICE O/P EST LOW 20 MIN: CPT | Performed by: STUDENT IN AN ORGANIZED HEALTH CARE EDUCATION/TRAINING PROGRAM

## 2024-09-19 PROCEDURE — 84443 ASSAY THYROID STIM HORMONE: CPT

## 2024-09-19 PROCEDURE — 36415 COLL VENOUS BLD VENIPUNCTURE: CPT

## 2024-09-19 PROCEDURE — 85025 COMPLETE CBC W/AUTO DIFF WBC: CPT

## 2024-09-19 PROCEDURE — 83970 ASSAY OF PARATHORMONE: CPT

## 2024-09-19 PROCEDURE — 3077F SYST BP >= 140 MM HG: CPT | Performed by: STUDENT IN AN ORGANIZED HEALTH CARE EDUCATION/TRAINING PROGRAM

## 2024-09-19 PROCEDURE — 84439 ASSAY OF FREE THYROXINE: CPT

## 2024-09-19 PROCEDURE — 3078F DIAST BP <80 MM HG: CPT | Performed by: STUDENT IN AN ORGANIZED HEALTH CARE EDUCATION/TRAINING PROGRAM

## 2024-09-19 NOTE — PATIENT INSTRUCTIONS
Please get an ultrasound done as soon as possible.     I will try to have you see a gynecologist soon.     Please get your blood work done today.

## 2024-09-19 NOTE — ASSESSMENT & PLAN NOTE
On pradaxa. No prior complications of bleeding in urine or stool. HDS. Mildly hypertensive in office. Systolic murmur heard on exam.     Plan  - Since bleeding is not excessive and no signs of acute blood loss anemia, will continue pradaxa despite active bleeding  - If repeat cbc acutely low, will hold pradaxa and may rec ED

## 2024-09-19 NOTE — PROGRESS NOTES
Ambulatory Visit  Name: Ade Moon      : 1940      MRN: [id number]  Encounter Provider: Andriy Roman MD  Encounter Date: 2024   Enc[facility]    Assessment & Plan  Vaginal bleeding, abnormal    Pt is a 85 YO F with PMH of afib, CVA, CKD, CAD, HFpEF, RICH, presenting to office for 3-4 day history of AUB. Postmenopausal, no prior h/o of malignancy, no pelvic or intraabdominal surgeries. Not sexually active. No prior vaginal trauma. No assoc B symptom. No masses palpated on vaginal exam. Less likely blood from urinary or bowel source based on history. C/f endometrial hyperplasia/malignancy. Plan for TVUS, CBC, and TSH.     Chronic anemia from CKD and b12 def. Baseline 11-12. Chronic fatigue from cardiac history and age. TSH last time borderline high with normal free T4. Will recheck. Referral to gyn.     Orders:    US abdomen and pelvis with transvaginal; Future    CBC and differential; Future    TSH + Free T4; Future    Ambulatory Referral to Obstetrics / Gynecology; Future    B12 deficiency [E53.8]  MCV high. Takes B12 supp. Will check cbc       Anemia due to vitamin B12 deficiency, unspecified B12 deficiency type         Chronic atrial fibrillation (HCC)  On pradaxa. No prior complications of bleeding in urine or stool. HDS. Mildly hypertensive in office. Systolic murmur heard on exam.     Plan  - Since bleeding is not excessive and no signs of acute blood loss anemia, will continue pradaxa despite active bleeding  - If repeat cbc acutely low, will hold pradaxa and may rec ED          History of Present Illness     HPI  Patient is a 84-year-old F with PMH of HTN, HLD, CKD 3, A-fib, CVA, CAD, RICH, pulmonary hypertension, tachybradycardia syndrome s/p PPM, HFpEF presenting to the office for same-day complaining of abnormal vaginal bleeding.  Started on Saturday with clots. No clots since, but still having spotting since that time. No associated  pain. No history of similar symptoms. No other bleeding issues including the urine or hemoptysis. Menopause around 50s. Menarche around 13 years old. No increased fatigue, lightheadedness, dizziness besides chronic age related and cardiac etiology.    Of note, patient does utilize Pradaxa for the past 2 years for chronic A-fib. Before that, she was on xarelto but had a stroke. Patient also has history of anemia with concomitant B12 deficiency.  Patient has [facility] home service.    Patient was found to have elevated kappa and lambda light chain ratio. likely secondary to CKD given negative previous SPEP.    Gyn surgery last documented on 2016 for stress incontinence s/p colpopexy. No other intraabdominal or pelvic history. Normal vaginal deliveries. No personal h/o cancer. Sister with previous breast cancer. No endorsement of b symptoms though she did have occasional night sweats in the past. Has chronic cough, not worse. No urinary complaints like dysuria or hematuria.     No sexually active. No known h/o STI. No vaginal trauma. No h/o cervical cancer or dysplasia. No known h/o thyroid disorder. Last tsh 8/2023: elevated with normal T4      History obtained from : patient  Review of Systems   Constitutional:  Positive for fatigue (chornic). Negative for activity change, appetite change, chills, diaphoresis (previously had night sweats, but improved), fever and unexpected weight change.   HENT:  Negative for congestion, sore throat and trouble swallowing.    Eyes:  Negative for visual disturbance.   Respiratory:  Positive for cough. Negative for shortness of breath.    Cardiovascular:  Positive for leg swelling. Negative for chest pain and palpitations.   Gastrointestinal:  Negative for abdominal pain, anal bleeding, blood in stool, constipation, diarrhea, nausea, rectal pain and vomiting.   Genitourinary:  Positive for vaginal bleeding. Negative for dysuria, hematuria, pelvic pain, vaginal discharge and vaginal pain.    Neurological:  Negative for dizziness, light-headedness and headaches.   Hematological:  Does not bruise/bleed easily.     Pertinent Medical History       Medical History Reviewed by provider this encounter:       Past Medical History   Past Medical History:   Diagnosis Date    A-fib (HCC)     Anemia     Arthritis     Breast pain, right     Chest pain on breathing     Colon polyp     Cough     Diverticulosis     Encounter for screening colonoscopy     H/O sick sinus syndrome     Heart murmur     High cholesterol     History of atrial fibrillation     Rate ontrolled. On xarelto 15mg (creatinine 50) Followed by Dr. Randolph with Cardiology     History of weight loss     Report loose fitting clothes. Weight stable (3lbs difference). Last colo showed small tubular adenoma x2. Breast biopsy last showed fibrocystic changes. TSH wnl. Will check cbc, bmp, spep.        Hx of long term use of blood thinners     Hypertension     Irregular heart beat     RICH (obstructive sleep apnea)     Pacemaker     Preop examination     Shortness of breath     Viral bronchitis      Past Surgical History:   Procedure Laterality Date    BREAST BIOPSY Right 08/17/2006    ultrasound guided Percutaneous Needle Core -Benign    CATARACT EXTRACTION      CATARACT EXTRACTION W/ INTRAOCULAR LENS  IMPLANT, BILATERAL      COLONOSCOPY      COLONOSCOPY N/A 05/22/2018    Procedure: COLONOSCOPY;  Surgeon: Jenn Jang DO;  [facility]: [facility];  Service: Gastroenterology    INSERT / REPLACE / REMOVE PACEMAKER      LEG SURGERY Right     as a child after a fall    MAMMO STEREOTACTIC BREAST BIOPSY RIGHT (ALL INC) Right 10/2014    benign    OK CMBND ANTERPOST COLPORRAPHY W/CYSTO N/A 03/17/2016    Procedure: COLPORRHAPHY ANTERIOR POSTERIOR ;  Surgeon: Dario Braden MD;  [facility];  Service: Gynecology    OK COLONOSCOPY FLX DX W/COLLJ SPEC WHEN PFRMD N/A 04/04/2019    Procedure: COLONOSCOPY;  Surgeon: Jenn Jang DO;  [facility];  Service: Gastroenterology    CT COLPOPEXY VAGINAL EXTRAPERITONEAL APPROACH N/A 03/17/2016    Procedure: COLPOPEXY VAGINAL EXTRAPERITONEAL (VEC) ANTERIOR ;  Surgeon: Dario Braden MD;  [facility];  Service: Gynecology    CT CYSTOURETHROSCOPY N/A 03/17/2016    Procedure: CYSTOSCOPY;  Surgeon: Dario Braden MD;  [facility];  Service: Gynecology    CT ESOPHAGOGASTRODUODENOSCOPY TRANSORAL DIAGNOSTIC N/A 04/16/2018    Procedure: EGD AND COLONOSCOPY;  Surgeon: Jenn Jang DO;  [facility]: [facility];  Service: Gastroenterology    CT LAPAROSCOPY COLECTOMY PARTIAL W/ANASTOMOSIS Right 01/13/2022    Procedure: Diagnostic laparoscopy, laparscopic right colectomy;  Surgeon: Andriy Guevara MD;  [facility]: [facility];  Service: Colorectal    CT SLING OPERATION STRESS INCONTINENCE N/A 03/17/2016    Procedure: INSERTION PUBOVAGINAL SLING SINGLE INCISION ;  Surgeon: Dario Braden MD;  [facility];  Service: Gynecology     Family History   Problem Relation Age of Onset    Diabetes Mother     Breast cancer Sister 53    No Known Problems Father     No Known Problems Maternal Grandmother     No Known Problems Maternal Grandfather     No Known Problems Paternal Grandmother     No Known Problems Paternal Grandfather     No Known Problems Daughter     No Known Problems Son     No Known Problems Sister     No Known Problems Sister     No Known Problems Brother     No Known Problems Brother     No Known Problems Sister     No Known Problems Paternal Aunt     No Known Problems Paternal Aunt     No Known Problems Paternal Aunt     No Known Problems Paternal Aunt      Current Outpatient Medications on File Prior to Visit   Medication Sig Dispense Refill    acetaminophen (TYLENOL) 325 mg tablet Take 2 tablets (650 mg total) by mouth every 6 (six) hours as needed for mild pain  0    ammonium lactate (LAC-HYDRIN) 12 % cream APPLY TOPICALLY AS NEEDED FOR DRY SKIN 385 g 3    Blood  Pressure Monitoring (Blood Pressure Cuff) MISC Use every other day For HTN (Patient not taking: Reported on 8/15/2023) 1 each 0    cyanocobalamin 1,000 mcg/mL INJECT 1 ML (1,000 MCG TOTAL) INTO A MUSCLE EVERY 30 (THIRTY) DAYS 3 mL 0    Empagliflozin (Jardiance) 10 MG TABS tablet Take 1 tablet (10 mg total) by mouth every morning 90 tablet 5    gabapentin (NEURONTIN) 100 mg capsule TAKE 1 CAPSULE (100 MG TOTAL) BY MOUTH 3 (THREE) TIMES A DAY AS NEEDED (AS NEEDED FOR LEFT FOOT PAIN) 90 capsule 1    Incontinence Supply Disposable (RA Adult Wipes) MISC Use 4 (four) times a day 64 each 10    losartan (COZAAR) 50 mg tablet Take 1 tablet (50 mg total) by mouth 2 (two) times a day 180 tablet 5    nebivolol (BYSTOLIC) 5 mg tablet TAKE 1 TABLET (5 MG TOTAL) BY MOUTH DAILY 90 tablet 3    nitroglycerin (NITROSTAT) 0.4 mg SL tablet Place 1 tablet (0.4 mg total) under the tongue every 5 (five) minutes as needed for chest pain (Patient not taking: Reported on 8/3/2023)  0    pantoprazole (PROTONIX) 40 mg tablet TAKE 1 TABLET (40 MG TOTAL) BY MOUTH DAILY IN THE EARLY MORNING 90 tablet 3    Pradaxa 150 MG capsu SB JUAN ANTONIO CAPSULA POR VIA ORAL DOS VECES AL KATRINA 180 capsule 3    rosuvastatin (CRESTOR) 5 mg tablet TAKE 1 TABLET (5 MG TOTAL) BY MOUTH DAILY 90 tablet 3    spironolactone (ALDACTONE) 25 mg tablet Take 1 tablet (25 mg total) by mouth daily 90 tablet 5    verapamil (CALAN-SR) 240 mg CR tablet TAKE 1 TABLET (240 MG TOTAL) BY MOUTH DAILY AT BEDTIME 90 tablet 3    [DISCONTINUED] apixaban (Eliquis) 5 mg Take 1 tablet (5 mg total) by mouth 2 (two) times a day 60 tablet 0     Current Facility-Administered Medications on File Prior to Visit   Medication Dose Route Frequency Provider Last Rate Last Admin    cyanocobalamin injection 1,000 mcg  1,000 mcg Intramuscular Q30 Days Deepak Hermosillo MD   1,000 mcg at 09/11/24 1021     Allergies   Allergen Reactions    Other Itching     Bandaids    Tape [Medical Tape] Itching      Current  Outpatient Medications on File Prior to Visit   Medication Sig Dispense Refill    acetaminophen (TYLENOL) 325 mg tablet Take 2 tablets (650 mg total) by mouth every 6 (six) hours as needed for mild pain  0    ammonium lactate (LAC-HYDRIN) 12 % cream APPLY TOPICALLY AS NEEDED FOR DRY SKIN 385 g 3    Blood Pressure Monitoring (Blood Pressure Cuff) MISC Use every other day For HTN (Patient not taking: Reported on 8/15/2023) 1 each 0    cyanocobalamin 1,000 mcg/mL INJECT 1 ML (1,000 MCG TOTAL) INTO A MUSCLE EVERY 30 (THIRTY) DAYS 3 mL 0    Empagliflozin (Jardiance) 10 MG TABS tablet Take 1 tablet (10 mg total) by mouth every morning 90 tablet 5    gabapentin (NEURONTIN) 100 mg capsule TAKE 1 CAPSULE (100 MG TOTAL) BY MOUTH 3 (THREE) TIMES A DAY AS NEEDED (AS NEEDED FOR LEFT FOOT PAIN) 90 capsule 1    Incontinence Supply Disposable (RA Adult Wipes) MISC Use 4 (four) times a day 64 each 10    losartan (COZAAR) 50 mg tablet Take 1 tablet (50 mg total) by mouth 2 (two) times a day 180 tablet 5    nebivolol (BYSTOLIC) 5 mg tablet TAKE 1 TABLET (5 MG TOTAL) BY MOUTH DAILY 90 tablet 3    nitroglycerin (NITROSTAT) 0.4 mg SL tablet Place 1 tablet (0.4 mg total) under the tongue every 5 (five) minutes as needed for chest pain (Patient not taking: Reported on 8/3/2023)  0    pantoprazole (PROTONIX) 40 mg tablet TAKE 1 TABLET (40 MG TOTAL) BY MOUTH DAILY IN THE EARLY MORNING 90 tablet 3    Pradaxa 150 MG capsu BS JUAN ANTONIO CAPSULA POR VIA ORAL DOS VECES AL KATRINA 180 capsule 3    rosuvastatin (CRESTOR) 5 mg tablet TAKE 1 TABLET (5 MG TOTAL) BY MOUTH DAILY 90 tablet 3    spironolactone (ALDACTONE) 25 mg tablet Take 1 tablet (25 mg total) by mouth daily 90 tablet 5    verapamil (CALAN-SR) 240 mg CR tablet TAKE 1 TABLET (240 MG TOTAL) BY MOUTH DAILY AT BEDTIME 90 tablet 3    [DISCONTINUED] apixaban (Eliquis) 5 mg Take 1 tablet (5 mg total) by mouth 2 (two) times a day 60 tablet 0     Current Facility-Administered Medications on File Prior  "to Visit   Medication Dose Route Frequency Provider Last Rate Last Admin    cyanocobalamin injection 1,000 mcg  1,000 mcg Intramuscular Q30 Days Deepak Hermosillo MD   1,000 mcg at 09/11/24 1021      Social History     Tobacco Use    Smoking status: Never    Smokeless tobacco: Never   Vaping Use    Vaping status: Never Used   Substance and Sexual Activity    Alcohol use: Never    Drug use: No    Sexual activity: Not Currently     Comment:          Objective     /72 (BP Location: Right arm, Patient Position: Sitting, Cuff Size: Standard)   Pulse 80   Temp 97.7 °F (36.5 °C) (Temporal)   Ht 5' 1\" (1.549 m)   Wt 64.6 kg (142 lb 8 oz)   SpO2 99%   BMI 26.93 kg/m²     Physical Exam  Constitutional:       General: She is not in acute distress.     Appearance: Normal appearance. She is normal weight. She is not ill-appearing, toxic-appearing or diaphoretic.   HENT:      Head: Normocephalic and atraumatic.      Nose: Nose normal.      Mouth/Throat:      Mouth: Mucous membranes are dry.      Pharynx: No oropharyngeal exudate or posterior oropharyngeal erythema.   Eyes:      General: No scleral icterus.     Extraocular Movements: Extraocular movements intact.      Pupils: Pupils are equal, round, and reactive to light.   Cardiovascular:      Rate and Rhythm: Normal rate. Rhythm irregular.      Heart sounds: Murmur heard.   Pulmonary:      Effort: Pulmonary effort is normal.      Breath sounds: Normal breath sounds.   Abdominal:      General: Abdomen is flat. Bowel sounds are normal.      Palpations: There is no mass.      Tenderness: There is abdominal tenderness (LUQ). There is no right CVA tenderness, left CVA tenderness, guarding or rebound.   Genitourinary:     Vagina: Vaginal discharge (trace blood appreciated on manual vaginal exam) present.      Comments: No mass palpated; no tendereness during exam; no malodor   Musculoskeletal:         General: No deformity.      Cervical back: No tenderness.      " Right lower leg: Edema present.      Left lower leg: Edema present.   Lymphadenopathy:      Cervical: No cervical adenopathy.   Skin:     General: Skin is warm and dry.      Capillary Refill: Capillary refill takes less than 2 seconds.      Coloration: Skin is not jaundiced.      Findings: No bruising.      Comments: Skin thinning in LE   Neurological:      General: No focal deficit present.      Mental Status: She is alert and oriented to person, place, and time.   Psychiatric:         Mood and Affect: Mood normal.         Behavior: Behavior normal.

## 2024-09-23 ENCOUNTER — PATIENT OUTREACH (OUTPATIENT)
Dept: INTERNAL MEDICINE CLINIC | Facility: CLINIC | Age: 84
End: 2024-09-23

## 2024-09-23 NOTE — PROGRESS NOTES
MIS received a call from pt who called with an Important Question.  MIS added  ID # [id number].    Pt shares that due to her increasing left leg and foot problems she shares that she would like to have her blood work done at home.   SW did shares that [facility] does have a MOBILE Lab.  Pt is also interested in having her B12 Injections done as well as  Blood Pressure checks done in her home.  She shares as well that her blood Pressure machine is not working. Mis informed her that the PCP can attempt to have a new ordered to see if her insurance will cover it.    M[facility]  [phone] and spoke to Intake.    She shares that insurance will not pay for B12 Injections to be done in the home.    If the PCP needs to be adjusting her Blood Pressure medications they may follow short term for same.    Mis has shares this information with pt.  MIS to ask the Clinical Team and Physician to review and assist to set up the Mobile Lab and any possible order for Blood Pressure check up if appropriate.   MIS to also ask if PCP can order a new Blood Pressure machine.    MIS has sent a TASK for same.

## 2024-09-24 ENCOUNTER — TELEPHONE (OUTPATIENT)
Dept: INTERNAL MEDICINE CLINIC | Facility: CLINIC | Age: 84
End: 2024-09-24

## 2024-09-24 NOTE — TELEPHONE ENCOUNTER
While speaking with , patient inquired about having her b-12 injections administered at home instead of coming in to office. She also is interested in mobile lab.    Please advise.

## 2024-09-24 NOTE — TELEPHONE ENCOUNTER
Called and spoke to patient. Patient stated legs have been hurting to walk lately.Patient stated that her son suggested that she should try and see if someone could  come to her home to do B-12 injections and blood work. Patient stated that if this is not possible she was fine with coming in to office as she always does. Patient stated that she has no one she can trust to administer B-12 injections at home. Patient stated the people at her home are not comfortable giving her B -12 injections and do not like needles. Patient was told we do not do home injections and we can discuss mobile lab at next visit.

## 2024-09-24 NOTE — TELEPHONE ENCOUNTER
Called and spoke to patient. Patient was told that insurance companies are no longer paying for blood pressure monitors. Patient was informed that she can purchase one at the pharmacy of choice or [employer]. Patient understood and agreed.

## 2024-09-25 ENCOUNTER — HOSPITAL ENCOUNTER (OUTPATIENT)
Dept: RADIOLOGY | Facility: HOSPITAL | Age: 84
Discharge: HOME | End: 2024-09-25
Payer: MEDICARE

## 2024-09-25 DIAGNOSIS — N93.9 VAGINAL BLEEDING, ABNORMAL: ICD-10-CM

## 2024-09-25 PROCEDURE — 76856 US EXAM PELVIC COMPLETE: CPT

## 2024-09-25 PROCEDURE — 76830 TRANSVAGINAL US NON-OB: CPT

## 2024-09-28 DIAGNOSIS — I50.30 HEART FAILURE WITH PRESERVED EJECTION FRACTION, UNSPECIFIED HF CHRONICITY (HCC): ICD-10-CM

## 2024-09-30 RX ORDER — EMPAGLIFLOZIN 10 MG/1
10 TABLET, FILM COATED ORAL EVERY MORNING
Qty: 90 TABLET | Refills: 5 | Status: SHIPPED | OUTPATIENT
Start: 2024-09-30

## 2024-10-02 ENCOUNTER — OFFICE VISIT (OUTPATIENT)
Dept: OBGYN CLINIC | Facility: CLINIC | Age: 84
End: 2024-10-02

## 2024-10-02 VITALS
WEIGHT: 140 LBS | BODY MASS INDEX: 26.43 KG/M2 | HEART RATE: 64 BPM | DIASTOLIC BLOOD PRESSURE: 65 MMHG | HEIGHT: 61 IN | SYSTOLIC BLOOD PRESSURE: 145 MMHG

## 2024-10-02 DIAGNOSIS — N95.0 PMB (POSTMENOPAUSAL BLEEDING): Primary | ICD-10-CM

## 2024-10-02 DIAGNOSIS — N93.9 VAGINAL BLEEDING, ABNORMAL: ICD-10-CM

## 2024-10-02 PROCEDURE — 58100 BIOPSY OF UTERUS LINING: CPT | Performed by: OBSTETRICS & GYNECOLOGY

## 2024-10-02 PROCEDURE — 3077F SYST BP >= 140 MM HG: CPT | Performed by: OBSTETRICS & GYNECOLOGY

## 2024-10-02 PROCEDURE — 3078F DIAST BP <80 MM HG: CPT | Performed by: OBSTETRICS & GYNECOLOGY

## 2024-10-02 PROCEDURE — 99204 OFFICE O/P NEW MOD 45 MIN: CPT | Performed by: OBSTETRICS & GYNECOLOGY

## 2024-10-02 NOTE — PROGRESS NOTES
"Endometrial biopsy    Date/Time: 10/2/2024 1:30 PM    Performed by: Davina Guerrero MD  Authorized by: Davina Guerrero MD  [place] Protocol:  Procedure performed by: (Dr. Guo)  Consent: Verbal consent obtained.  Risks and benefits: risks, benefits and alternatives were discussed  Consent given by: patient  Time out: Immediately prior to procedure a \"time out\" was called to verify the correct patient, procedure, equipment, support staff and site/side marked as required.  Patient understanding: patient states understanding of the procedure being performed  Patient identity confirmed: verbally with patient    Indication:     Indications: Post-menopausal bleeding      Chronicity of post-menopausal bleeding:  New    Progression of post-menopausal bleeding:  Resolved  Procedure:     Procedure: endometrial biopsy with [place]      A bivalve speculum was placed in the vagina: yes      Cervix cleaned and prepped: yes      A paracervical block was performed: no      An intracervical block was performed: no      Unable to perform due to: cervical stenosis    Findings:     Cervix: stenotic    Comments:     Procedure comments:      ASSESSMENT / PLAN:    Ade Moon is a 84 y.o.  female presenting for post menopausal bleeding. TVUS showed an endometrial stripe of 5mm.     Plan:  - Unable to complete EMB due to cervical stenosis. Patient consented for EUA, Hysteroscopy D&C, and all other indicated procedures  - Patient will need preoperative clearance from cardiology due to pacemaker and other cardiac diagnoses  - The patient was counseled regarding indications, risks, benefits and alternatives to surgical management.  We discussed risks including but not limited to bleeding and potential need for blood transfusions, infection, pain, injury to surrounding organs such as bladder, intestines, ureters and neurovascular structures. Patient consents for blood transfusions if those are deemed necessary during the " course of care.  She understands risks include but are not limited to hypersensitivity reactions and infection with blood-borne pathogens.  Patient verbalizes understanding, agrees and wants to proceed.  Informed consent form signed. All questions answered to patient's satisfaction.       SUBJECTIVE:    Ade Moon is a 84 y.o.  female presenting for one episode of PMB. She underwent menopause around the age of 50. She has had no PMB up until 2 weeks ago where she had what seemed like a period. She underwent TVUS which noted an EMS of 5mm. Her bleeding has stopped. We discussed the risks and benefits of an endometrial biopsy which she consents to.     Past Medical History:  No date: A-fib (HCC)  No date: Anemia  No date: Arthritis  No date: Breast pain, right  No date: Chest pain on breathing  No date: Colon polyp  No date: Cough  No date: Diverticulosis  No date: Encounter for screening colonoscopy  No date: H/O sick sinus syndrome  No date: Heart murmur  No date: High cholesterol  No date: History of atrial fibrillation      Comment:  Rate ontrolled. On xarelto 15mg (creatinine 50) Followed               by Dr. Randolph with Cardiology   No date: History of weight loss      Comment:  Report loose fitting clothes. Weight stable (3lbs                difference). Last colo showed small tubular adenoma x2.                Breast biopsy last showed fibrocystic changes. TSH wnl.                Will check cbc, bmp, spep.      No date: Hx of long term use of blood thinners  No date: Hypertension  No date: Irregular heart beat  No date: RICH (obstructive sleep apnea)  No date: Pacemaker  No date: Preop examination  No date: Shortness of breath  No date: Viral bronchitis    Past Surgical History:  2006: BREAST BIOPSY; Right      Comment:  ultrasound guided Percutaneous Needle Core -Benign  No date: CATARACT EXTRACTION  No date: CATARACT EXTRACTION W/ INTRAOCULAR LENS  IMPLANT, BILATERAL  No date:  COLONOSCOPY  05/22/2018: COLONOSCOPY; N/A      Comment:  Procedure: COLONOSCOPY;  Surgeon: Jenn Jang DO;                 [facility];  Service: Gastroenterology  No date: INSERT / REPLACE / REMOVE PACEMAKER  No date: LEG SURGERY; Right      Comment:  as a child after a fall  10/2014: MAMMO STEREOTACTIC BREAST BIOPSY RIGHT (ALL INC); Right      Comment:  benign  03/17/2016: NH CMBND ANTERPOST COLPORRAPHY W/CYSTO; N/A      Comment:  Procedure: COLPORRHAPHY ANTERIOR POSTERIOR ;  Surgeon:                Dario Braden MD;  [facility];  Service:               Gynecology  04/04/2019: NH COLONOSCOPY FLX DX W/COLLJ SPEC WHEN PFRMD; N/A      Comment:  Procedure: COLONOSCOPY;  Surgeon: Jenn Jang DO;                 [facility];  Service: Gastroenterology  03/17/2016: NH COLPOPEXY VAGINAL EXTRAPERITONEAL APPROACH; N/A      Comment:  Procedure: COLPOPEXY VAGINAL EXTRAPERITONEAL (VEC)                ANTERIOR ;  Surgeon: Dario Braden MD;  [facility];  Service: Gynecology  03/17/2016: NH CYSTOURETHROSCOPY; N/A      Comment:  Procedure: CYSTOSCOPY;  Surgeon: Dario Braden MD;  [facility];  Service: Gynecology  04/16/2018: NH ESOPHAGOGASTRODUODENOSCOPY TRANSORAL DIAGNOSTIC; N/A      Comment:  Procedure: EGD AND COLONOSCOPY;  Surgeon: Jenn Jang DO;  [facility];  Service:                Gastroenterology  01/13/2022: NH LAPAROSCOPY COLECTOMY PARTIAL W/ANASTOMOSIS; Right      Comment:  Procedure: Diagnostic laparoscopy, laparscopic right                colectomy;  Surgeon: Andriy Guevara MD;  [facility];  Service: Colorectal  03/17/2016: NH SLING OPERATION STRESS INCONTINENCE; N/A      Comment:  Procedure: INSERTION PUBOVAGINAL SLING SINGLE INCISION ;               Surgeon: Dario Braden MD;  [facility];                Service:  "Gynecology    OBJECTIVE:    Vitals:  Blood pressure 145/65, pulse 64, height 5' 1\" (1.549 m), weight 63.5 kg (140 lb).Body mass index is 26.45 kg/m².    Davina Guerrero MD  10/2/2024  6:20 PM             "

## 2024-10-03 ENCOUNTER — TELEPHONE (OUTPATIENT)
Dept: OBGYN CLINIC | Facility: CLINIC | Age: 84
End: 2024-10-03

## 2024-10-03 NOTE — TELEPHONE ENCOUNTER
[facility] Women's Health Surgical Case Review  Date Reviewed: 10/03/24  Reviewed by: DEB Guerrero, Dr. Cherry  Case request: EUA, hysteroscopy, D&C  Indication: Postmenopausal bleeding with EMS 5mm and cervical stenosis  Surgical Consent: signed 10/2/24  MA30/31: not indicated    Case request approved: yes pending listed tasks    Comments:  Needs medical clearance  Can consider giving cytotec the evening before procedure to help with dilation    Davina Guerrero MD  PGY-2, OBGYN  10/03/24

## 2024-10-03 NOTE — TELEPHONE ENCOUNTER
----- Message from Davina Guerrero MD sent at 10/2/2024  3:06 PM EDT -----  [facility] GYN Department  Surgery Scheduling Sheet    Patient Name: Ade Moon  : 1940    Provider: Davina Guerrero MD     Needed: yes; Language: Citizen of the [place] and N/A    Procedure: exam under anesthesia, dilation and curettage , and hysteroscopy    Diagnosis: Postmenopausal Bleeding    Special Needs or Equipment: none    Anesthesia: IV sedation with anesthesia    Length of stay: outpatient  Does patient have comorbid conditions that will require close perioperative monitoring prior to safe discharge: yes    The patient has comorbid conditions that will require close perioperative monitoring prior to safe discharge, including multiple cardiac diagnoses and pacemaker    This may require acute care beyond the usual and routine recovery period. As such, inpatient admission post-operatively is expected and appropriate, and anticipated hospital length of stay will be >2 midnights.    Pre-Admission Testing Needed: yes   Labs that should be ordered: cbc, hgb A1C, and EKG    Order PAT that is recommended in prep for procedure?: Yes    Medical Clearance Needed: yes; Provider: cardiologyShayne MA Form Signed (tubals/hysterectomy): Not Indicated    Surgical Drink Given: no     How many days out of work: N/a      How many days no driving: N/a        Is pre op appt needed?  yes  Interval for post op appt: 2 week(s)     For Surgical Scheduler:     Surgery Scheduled On:  [place]: [facility]    Pre-op Appt:   Post op Appt:  Consult/Medical clearance appt:      Thanks!  Davina

## 2024-10-04 ENCOUNTER — TELEPHONE (OUTPATIENT)
Dept: OBGYN CLINIC | Facility: CLINIC | Age: 84
End: 2024-10-04

## 2024-10-08 ENCOUNTER — DOCUMENTATION (OUTPATIENT)
Dept: OBGYN CLINIC | Facility: CLINIC | Age: 84
End: 2024-10-08

## 2024-10-08 NOTE — PROGRESS NOTES
I called pt with   to set up procedure   for  D&C  for her PMB  left message for her to call office  can you ,   and will need a pre op post op exam as well.    Please let me know if she accepts  that date  so I can schedule her  case.   Try to make pre op  3 week prior  as she may need cardiac clearance  thanks .

## 2024-10-10 ENCOUNTER — TELEPHONE (OUTPATIENT)
Dept: NEUROLOGY | Facility: CLINIC | Age: 84
End: 2024-10-10

## 2024-10-10 DIAGNOSIS — L85.3 XEROSIS CUTIS: ICD-10-CM

## 2024-10-10 RX ORDER — AMMONIUM LACTATE 12 G/100G
CREAM TOPICAL AS NEEDED
Qty: 385 G | Refills: 3 | Status: SHIPPED | OUTPATIENT
Start: 2024-10-10

## 2024-10-10 NOTE — TELEPHONE ENCOUNTER
Called in regards to her upcoming appointment with [facility]. As the provider will be out of office and we will need to r/s. Pt accepted a new appointment on  11/4/24 @8:30.

## 2024-10-14 ENCOUNTER — TELEPHONE (OUTPATIENT)
Dept: INTERNAL MEDICINE CLINIC | Facility: CLINIC | Age: 84
End: 2024-10-14

## 2024-10-16 ENCOUNTER — CLINICAL SUPPORT (OUTPATIENT)
Dept: INTERNAL MEDICINE CLINIC | Facility: CLINIC | Age: 84
End: 2024-10-16

## 2024-10-16 DIAGNOSIS — E53.8 VITAMIN B12 DEFICIENCY: Primary | ICD-10-CM

## 2024-10-16 DIAGNOSIS — Z23 ENCOUNTER FOR IMMUNIZATION: ICD-10-CM

## 2024-10-16 PROCEDURE — 96372 THER/PROPH/DIAG INJ SC/IM: CPT

## 2024-10-16 PROCEDURE — 90678 RSV VACC PREF BIVALENT IM: CPT

## 2024-10-16 PROCEDURE — 90471 IMMUNIZATION ADMIN: CPT

## 2024-10-16 PROCEDURE — G0008 ADMIN INFLUENZA VIRUS VAC: HCPCS

## 2024-10-16 PROCEDURE — 90662 IIV NO PRSV INCREASED AG IM: CPT

## 2024-10-16 RX ADMIN — CYANOCOBALAMIN 1000 MCG: 1000 INJECTION, SOLUTION INTRAMUSCULAR; SUBCUTANEOUS at 09:49

## 2024-10-16 NOTE — PROGRESS NOTES
Patient came in to clinic to have B-12 injection administered (cyanocobalamin injection 1,000 mcg) to the Left Deltoid, 10/16/24. Patient tolerated well and is aware to return in 30 days.        CYANOCOBALAMMIN,1,000 mcg/ml     LOT# [id number]     EXP: 10/31/25

## 2024-10-28 DIAGNOSIS — M79.672 LEFT FOOT PAIN: ICD-10-CM

## 2024-10-28 DIAGNOSIS — Z86.73 HISTORY OF CVA (CEREBROVASCULAR ACCIDENT): ICD-10-CM

## 2024-10-28 RX ORDER — GABAPENTIN 100 MG/1
100 CAPSULE ORAL 3 TIMES DAILY PRN
Qty: 90 CAPSULE | Refills: 1 | Status: SHIPPED | OUTPATIENT
Start: 2024-10-28

## 2024-10-29 ENCOUNTER — TELEPHONE (OUTPATIENT)
Dept: INTERNAL MEDICINE CLINIC | Facility: CLINIC | Age: 84
End: 2024-10-29

## 2024-10-29 NOTE — TELEPHONE ENCOUNTER
Folder Color-Red     Name of Form- [employer] [employer]     Form to be filled out by-Jaimee     Form to be picked up by son     Patient made aware of 10 business day policy.

## 2024-10-31 ENCOUNTER — OFFICE VISIT (OUTPATIENT)
Dept: INTERNAL MEDICINE CLINIC | Facility: CLINIC | Age: 84
End: 2024-10-31

## 2024-10-31 VITALS
TEMPERATURE: 97.9 F | DIASTOLIC BLOOD PRESSURE: 57 MMHG | BODY MASS INDEX: 27.78 KG/M2 | WEIGHT: 147 LBS | HEART RATE: 57 BPM | SYSTOLIC BLOOD PRESSURE: 139 MMHG

## 2024-10-31 DIAGNOSIS — N18.32 STAGE 3B CHRONIC KIDNEY DISEASE (HCC): ICD-10-CM

## 2024-10-31 DIAGNOSIS — Z00.00 MEDICARE ANNUAL WELLNESS VISIT, SUBSEQUENT: Primary | ICD-10-CM

## 2024-10-31 DIAGNOSIS — I63.30 CEREBRAL THROMBOSIS WITH CEREBRAL INFARCTION (HCC): ICD-10-CM

## 2024-10-31 DIAGNOSIS — I50.32 CHRONIC HEART FAILURE WITH PRESERVED EJECTION FRACTION (HCC): ICD-10-CM

## 2024-10-31 DIAGNOSIS — I10 ESSENTIAL HYPERTENSION: ICD-10-CM

## 2024-10-31 DIAGNOSIS — N93.9 VAGINAL BLEEDING: ICD-10-CM

## 2024-10-31 PROCEDURE — 3078F DIAST BP <80 MM HG: CPT | Performed by: STUDENT IN AN ORGANIZED HEALTH CARE EDUCATION/TRAINING PROGRAM

## 2024-10-31 PROCEDURE — 3075F SYST BP GE 130 - 139MM HG: CPT | Performed by: STUDENT IN AN ORGANIZED HEALTH CARE EDUCATION/TRAINING PROGRAM

## 2024-10-31 PROCEDURE — G0439 PPPS, SUBSEQ VISIT: HCPCS | Performed by: STUDENT IN AN ORGANIZED HEALTH CARE EDUCATION/TRAINING PROGRAM

## 2024-10-31 NOTE — ASSESSMENT & PLAN NOTE
Currently follows with HF who is managing her HTN along with PCP office   Currently on losartan 50 BID, spirolactone 25

## 2024-10-31 NOTE — PATIENT INSTRUCTIONS
Medicare Preventive Visit Patient Instructions  Thank you for completing your Welcome to Medicare Visit or Medicare Annual Wellness Visit today. Your next wellness visit will be due in one year (11/1/2025).  The screening/preventive services that you may require over the next 5-10 years are detailed below. Some tests may not apply to you based off risk factors and/or age. Screening tests ordered at today's visit but not completed yet may show as past due. Also, please note that scanned in results may not display below.  Preventive Screenings:  Service Recommendations Previous Testing/Comments   Colorectal Cancer Screening  * Colonoscopy    * Fecal Occult Blood Test (FOBT)/Fecal Immunochemical Test (FIT)  * Fecal DNA/Cologuard Test  * Flexible Sigmoidoscopy Age: 45-75 years old   Colonoscopy: every 10 years (may be performed more frequently if at higher risk)  OR  FOBT/FIT: every 1 year  OR  Cologuard: every 3 years  OR  Sigmoidoscopy: every 5 years  Screening may be recommended earlier than age 45 if at higher risk for colorectal cancer. Also, an individualized decision between you and your healthcare provider will decide whether screening between the ages of 76-85 would be appropriate. Colonoscopy: 09/20/2021  FOBT/FIT: Not on file  Cologuard: Not on file  Sigmoidoscopy: Not on file          Breast Cancer Screening Age: 40+ years old  Frequency: every 1-2 years  Not required if history of left and right mastectomy Mammogram: 11/15/2023        Cervical Cancer Screening Between the ages of 21-29, pap smear recommended once every 3 years.   Between the ages of 30-65, can perform pap smear with HPV co-testing every 5 years.   Recommendations may differ for women with a history of total hysterectomy, cervical cancer, or abnormal pap smears in past. Pap Smear: Not on file        Hepatitis C Screening Once for adults born between 1945 and 1965  More frequently in patients at high risk for Hepatitis C Hep C Antibody: Not  on file        Diabetes Screening 1-2 times per year if you're at risk for diabetes or have pre-diabetes Fasting glucose: 101 mg/dL (6/15/2024)  A1C: 5.4 % (8/13/2022)      Cholesterol Screening Once every 5 years if you don't have a lipid disorder. May order more often based on risk factors. Lipid panel: 08/30/2023          Other Preventive Screenings Covered by Medicare:  Abdominal Aortic Aneurysm (AAA) Screening: covered once if your at risk. You're considered to be at risk if you have a family history of AAA.  Lung Cancer Screening: covers low dose CT scan once per year if you meet all of the following conditions: (1) Age 55-77; (2) No signs or symptoms of lung cancer; (3) Current smoker or have quit smoking within the last 15 years; (4) You have a tobacco smoking history of at least 20 pack years (packs per day multiplied by number of years you smoked); (5) You get a written order from a healthcare provider.  Glaucoma Screening: covered annually if you're considered high risk: (1) You have diabetes OR (2) Family history of glaucoma OR (3)  aged 50 and older OR (4)  American aged 65 and older  Osteoporosis Screening: covered every 2 years if you meet one of the following conditions: (1) You're estrogen deficient and at risk for osteoporosis based off medical history and other findings; (2) Have a vertebral abnormality; (3) On glucocorticoid therapy for more than 3 months; (4) Have primary hyperparathyroidism; (5) On osteoporosis medications and need to assess response to drug therapy.   Last bone density test (DXA Scan): 12/21/2016.  HIV Screening: covered annually if you're between the age of 15-65. Also covered annually if you are younger than 15 and older than 65 with risk factors for HIV infection. For pregnant patients, it is covered up to 3 times per pregnancy.    Immunizations:  Immunization Recommendations   Influenza Vaccine Annual influenza vaccination during flu season is  recommended for all persons aged >= 6 months who do not have contraindications   Pneumococcal Vaccine   * Pneumococcal conjugate vaccine = PCV13 (Prevnar 13), PCV15 (Vaxneuvance), PCV20 (Prevnar 20)  * Pneumococcal polysaccharide vaccine = PPSV23 (Pneumovax) Adults 19-65 yo with certain risk factors or if 65+ yo  If never received any pneumonia vaccine: recommend Prevnar 20 (PCV20)  Give PCV20 if previously received 1 dose of PCV13 or PPSV23   Hepatitis B Vaccine 3 dose series if at intermediate or high risk (ex: diabetes, end stage renal disease, liver disease)   Respiratory syncytial virus (RSV) Vaccine - COVERED BY MEDICARE PART D  * RSVPreF3 (Arexvy) CDC recommends that adults 60 years of age and older may receive a single dose of RSV vaccine using shared clinical decision-making (SCDM)   Tetanus (Td) Vaccine - COST NOT COVERED BY MEDICARE PART B Following completion of primary series, a booster dose should be given every 10 years to maintain immunity against tetanus. Td may also be given as tetanus wound prophylaxis.   Tdap Vaccine - COST NOT COVERED BY MEDICARE PART B Recommended at least once for all adults. For pregnant patients, recommended with each pregnancy.   Shingles Vaccine (Shingrix) - COST NOT COVERED BY MEDICARE PART B  2 shot series recommended in those 19 years and older who have or will have weakened immune systems or those 50 years and older     Health Maintenance Due:      Topic Date Due   • Colorectal Cancer Screening  09/20/2022     Immunizations Due:      Topic Date Due   • COVID-19 Vaccine (4 - 2023-24 season) 09/01/2024     Advance Directives   What are advance directives?  Advance directives are legal documents that state your wishes and plans for medical care. These plans are made ahead of time in case you lose your ability to make decisions for yourself. Advance directives can apply to any medical decision, such as the treatments you want, and if you want to donate organs.   What are  the types of advance directives?  There are many types of advance directives, and each state has rules about how to use them. You may choose a combination of any of the following:  Living will:  This is a written record of the treatment you want. You can also choose which treatments you do not want, which to limit, and which to stop at a certain time. This includes surgery, medicine, IV fluid, and tube feedings.   Durable power of  for healthcare (DPAHC):  This is a written record that states who you want to make healthcare choices for you when you are unable to make them for yourself. This person, called a proxy, is usually a family member or a friend. You may choose more than 1 proxy.  Do not resuscitate (DNR) order:  A DNR order is used in case your heart stops beating or you stop breathing. It is a request not to have certain forms of treatment, such as CPR. A DNR order may be included in other types of advance directives.  Medical directive:  This covers the care that you want if you are in a coma, near death, or unable to make decisions for yourself. You can list the treatments you want for each condition. Treatment may include pain medicine, surgery, blood transfusions, dialysis, IV or tube feedings, and a ventilator (breathing machine).  Values history:  This document has questions about your views, beliefs, and how you feel and think about life. This information can help others choose the care that you would choose.  Why are advance directives important?  An advance directive helps you control your care. Although spoken wishes may be used, it is better to have your wishes written down. Spoken wishes can be misunderstood, or not followed. Treatments may be given even if you do not want them. An advance directive may make it easier for your family to make difficult choices about your care.   Weight Management   Why it is important to manage your weight:  Being overweight increases your risk of health  conditions such as heart disease, high blood pressure, type 2 diabetes, and certain types of cancer. It can also increase your risk for osteoarthritis, sleep apnea, and other respiratory problems. Aim for a slow, steady weight loss. Even a small amount of weight loss can lower your risk of health problems.  How to lose weight safely:  A safe and healthy way to lose weight is to eat fewer calories and get regular exercise. You can lose up about 1 pound a week by decreasing the number of calories you eat by 500 calories each day.   Healthy meal plan for weight management:  A healthy meal plan includes a variety of foods, contains fewer calories, and helps you stay healthy. A healthy meal plan includes the following:  Eat whole-grain foods more often.  A healthy meal plan should contain fiber. Fiber is the part of grains, fruits, and vegetables that is not broken down by your body. Whole-grain foods are healthy and provide extra fiber in your diet. Some examples of whole-grain foods are whole-wheat breads and pastas, oatmeal, brown rice, and bulgur.  Eat a variety of vegetables every day.  Include dark, leafy greens such as spinach, kale, noah greens, and mustard greens. Eat yellow and orange vegetables such as carrots, sweet potatoes, and winter squash.   Eat a variety of fruits every day.  Choose fresh or canned fruit (canned in its own juice or light syrup) instead of juice. Fruit juice has very little or no fiber.  Eat low-fat dairy foods.  Drink fat-free (skim) milk or 1% milk. Eat fat-free yogurt and low-fat cottage cheese. Try low-fat cheeses such as mozzarella and other reduced-fat cheeses.  Choose meat and other protein foods that are low in fat.  Choose beans or other legumes such as split peas or lentils. Choose fish, skinless poultry (chicken or turkey), or lean cuts of red meat (beef or pork). Before you cook meat or poultry, cut off any visible fat.   Use less fat and oil.  Try baking foods instead of  frying them. Add less fat, such as margarine, sour cream, regular salad dressing and mayonnaise to foods. Eat fewer high-fat foods. Some examples of high-fat foods include french fries, doughnuts, ice cream, and cakes.  Eat fewer sweets.  Limit foods and drinks that are high in sugar. This includes candy, cookies, regular soda, and sweetened drinks.  Exercise:  Exercise at least 30 minutes per day on most days of the week. Some examples of exercise include walking, biking, dancing, and swimming. You can also fit in more physical activity by taking the stairs instead of the elevator or parking farther away from stores. Ask your healthcare provider about the best exercise plan for you.      © Copyright [employer] 2018 Information is for End User's use only and may not be sold, redistributed or otherwise used for commercial purposes. All illustrations and images included in CareNotes® are the copyrighted property of [employer].A[employer]M., Inc. or [employer]

## 2024-10-31 NOTE — ASSESSMENT & PLAN NOTE
Lab Results   Component Value Date    EGFR 37 06/15/2024    EGFR 26 04/23/2024    EGFR 36 11/13/2023    CREATININE 1.31 (H) 06/15/2024    CREATININE 1.78 (H) 04/23/2024    CREATININE 1.33 (H) 11/13/2023     Currently follows with nephrology  Repeat BMP in December

## 2024-10-31 NOTE — ASSESSMENT & PLAN NOTE
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

## 2024-10-31 NOTE — ASSESSMENT & PLAN NOTE
Wt Readings from Last 3 Encounters:   10/31/24 66.7 kg (147 lb)   10/02/24 63.5 kg (140 lb)   09/19/24 64.6 kg (142 lb 8 oz)     Currently follows with Heart failure  GDMT: Jardiance, Spirolactone, Losartan  Previously on lasix 20 daily but this was discontinued by cardiology/nephrology  Euvolemic on exam today  ED precautions given

## 2024-10-31 NOTE — PROGRESS NOTES
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)  History of R MCA with residual effects of L leg that has been worsening recently  Will refer to PT  Neurology appointment next week  Orders:    Ambulatory Referral to Physical Therapy; Future    Chronic heart failure with preserved ejection fraction (HCC)  Wt Readings from Last 3 Encounters:   10/31/24 66.7 kg (147 lb)   10/02/24 63.5 kg (140 lb)   24 64.6 kg (142 lb 8 oz)     Currently follows with Heart failure  GDMT: Jardiance, Spirolactone, Losartan  Previously on lasix 20 daily but this was discontinued by cardiology/nephrology  Euvolemic on exam today  ED precautions given    Stage 3b chronic kidney disease (HCC)  Lab Results   Component Value Date    EGFR 37 06/15/2024    EGFR 26 2024    EGFR 36 2023    CREATININE 1.31 (H) 06/15/2024    CREATININE 1.78 (H) 2024    CREATININE 1.33 (H) 2023     Currently follows with nephrology  Repeat BMP in December  Vaginal bleeding  Noted to have vaginal bleeding at prior appt-sent to OB  Plan for D/C in 2025  Medicare annual wellness visit, subsequent    BMI Counseling: Body mass index is 27.78 kg/m². The BMI is above normal. Nutrition recommendations include decreasing portion sizes. No pharmacotherapy was ordered. Rationale for BMI follow-up plan is due to patient being overweight or obese.       Preventive health issues were discussed with patient, and age appropriate screening tests were ordered as noted in patient's After Visit Summary. Personalized health advice and appropriate referrals  for health education or preventive services given if needed, as noted in patient's After Visit Summary.    History of Present Illness     Patient is an 83 y/o with extensive past medical history including hypertension, hyperlipidemia, CKD 3, A-fib, CVA, CAD, RICH, pulmonary hypertension, tachybradycardia syndrome (pacemaker placed), heart failure preserved ejection fraction. Patient today is here for MAW. Today she states she feels overall tired. She is having issues with her L leg which is chronic from the stroke but it is worse as of lately. She does have follow up with neurology next week and is following with podiatry. No other complaints.       Patient Care Team:  Deepak Hermosillo MD as PCP - General (Internal Medicine)  Jenn Jang DO as Endoscopist  Joselyn Reyes Bahamonde, MD (Nephrology)    Review of Systems   Constitutional:  Positive for fatigue.   Cardiovascular:  Positive for leg swelling (L leg). Negative for chest pain.   Gastrointestinal:  Negative for nausea and vomiting.   Genitourinary:  Positive for vaginal bleeding (spotting).   Musculoskeletal:  Positive for myalgias.   Neurological:  Positive for weakness (Chronic but worsened in L Leg). Negative for headaches.   Psychiatric/Behavioral: Negative.       Medical History Reviewed by provider this encounter:       Annual Wellness Visit Questionnaire   Ade is here for her Subsequent Wellness visit.     Health Risk Assessment:   Patient rates overall health as very good. Patient feels that their physical health rating is slightly worse. Patient is very satisfied with their life. Eyesight was rated as same. Hearing was rated as slightly worse. Patient feels that their emotional and mental health rating is same. Patients states they are never, rarely angry. Patient states they are sometimes unusually tired/fatigued. Pain experienced in the last 7 days has been some. Patient's pain rating has been 8/10. Patient states that she has  experienced weight loss or gain in last 6 months.     Fall Risk Screening:   In the past year, patient has experienced: history of falling in past year    Number of falls: 2 or more  Injured during fall?: No    Feels unsteady when standing or walking?: Yes    Worried about falling?: Yes      Urinary Incontinence Screening:   Patient has not leaked urine accidently in the last six months.     Home Safety:  Patient has trouble with stairs inside or outside of their home. Patient has working smoke alarms and has working carbon monoxide detector. Home safety hazards include: none.     Nutrition:   Current diet is Regular.     Medications:   Patient is not currently taking any over-the-counter supplements. Patient is able to manage medications.     Activities of Daily Living (ADLs)/Instrumental Activities of Daily Living (IADLs):   Walk and transfer into and out of bed and chair?: No  Dress and groom yourself?: No    Bathe or shower yourself?: No    Feed yourself? No  Do your laundry/housekeeping?: No  Manage your money, pay your bills and track your expenses?: No  Make your own meals?: No    Do your own shopping?: No    ADL comments: Has a home health aid that helps with all ADL's, bills, laundry and shopping    Previous Hospitalizations:   Any hospitalizations or ED visits within the last 12 months?: No      PREVENTIVE SCREENINGS      Cardiovascular Screening:    General: Screening Not Indicated and History Lipid Disorder      Diabetes Screening:     General: Screening Current      Breast Cancer Screening:     General: Screening Current      Cervical Cancer Screening:    General: Screening Not Indicated      Lung Cancer Screening:     General: Screening Not Indicated    Screening, Brief Intervention, and Referral to Treatment (SBIRT)    Screening  Typical number of drinks in a day: 0  Typical number of drinks in a week: 0  Interpretation: Low risk drinking behavior.    Single Item Drug Screening:  How often have you  used an illegal drug (including marijuana) or a prescription medication for non-medical reasons in the past year? never    Single Item Drug Screen Score: 0  Interpretation: Negative screen for possible drug use disorder    Social Determinants of Health     Financial Resource Strain: Low Risk  (10/31/2024)    Overall Financial Resource Strain (CARDIA)     Difficulty of Paying Living Expenses: Not hard at all   Food Insecurity: No Food Insecurity (10/31/2024)    Hunger Vital Sign     Worried About Running Out of Food in the Last Year: Never true     Ran Out of Food in the Last Year: Never true   Transportation Needs: No Transportation Needs (10/31/2024)    PRAPARE - Transportation     Lack of Transportation (Medical): No     Lack of Transportation (Non-Medical): No   Housing Stability: Low Risk  (10/31/2024)    Housing Stability Vital Sign     Unable to Pay for Housing in the Last Year: No     Number of Times Moved in the Last Year: 1     Homeless in the Last Year: No   Utilities: Not At Risk (10/31/2024)    [facility] Utilities     Threatened with loss of utilities: No     No results found.    Objective     /57 (BP Location: Left arm, Patient Position: Sitting, Cuff Size: Large)   Pulse 57   Temp 97.9 °F (36.6 °C) (Temporal)   Wt 66.7 kg (147 lb)   BMI 27.78 kg/m²     Physical Exam  Vitals reviewed.   Constitutional:       Appearance: Normal appearance.   HENT:      Mouth/Throat:      Mouth: Mucous membranes are moist.      Pharynx: Oropharynx is clear.   Cardiovascular:      Rate and Rhythm: Normal rate and regular rhythm.   Pulmonary:      Effort: Pulmonary effort is normal.      Breath sounds: Normal breath sounds.   Abdominal:      General: Abdomen is flat. Bowel sounds are normal. There is no distension.      Tenderness: There is no abdominal tenderness.   Musculoskeletal:      Right lower leg: No edema.      Left lower leg: Edema present.   Skin:     General: Skin is warm and dry.   Neurological:      Mental  Status: She is alert and oriented to person, place, and time.   Psychiatric:         Mood and Affect: Mood normal.         Behavior: Behavior normal.

## 2024-11-01 DIAGNOSIS — E53.8 B12 DEFICIENCY: Chronic | ICD-10-CM

## 2024-11-01 RX ORDER — CYANOCOBALAMIN 1000 UG/ML
1000 INJECTION, SOLUTION INTRAMUSCULAR; SUBCUTANEOUS
Qty: 3 ML | Refills: 3 | Status: SHIPPED | OUTPATIENT
Start: 2024-11-01 | End: 2025-10-27

## 2024-11-01 NOTE — TELEPHONE ENCOUNTER
Lvm on patient's son vm notified form is ready for . Form scanned into patient chart. Form placed in  bin

## 2024-11-04 ENCOUNTER — OFFICE VISIT (OUTPATIENT)
Dept: NEUROLOGY | Facility: CLINIC | Age: 84
End: 2024-11-04
Payer: MEDICARE

## 2024-11-04 ENCOUNTER — IN-CLINIC DEVICE VISIT (OUTPATIENT)
Dept: CARDIOLOGY CLINIC | Facility: CLINIC | Age: 84
End: 2024-11-04
Payer: MEDICARE

## 2024-11-04 VITALS
SYSTOLIC BLOOD PRESSURE: 126 MMHG | HEART RATE: 82 BPM | WEIGHT: 144.9 LBS | TEMPERATURE: 98 F | BODY MASS INDEX: 27.38 KG/M2 | DIASTOLIC BLOOD PRESSURE: 72 MMHG | OXYGEN SATURATION: 97 %

## 2024-11-04 DIAGNOSIS — I77.9 BILATERAL CAROTID ARTERY DISEASE (HCC): ICD-10-CM

## 2024-11-04 DIAGNOSIS — R26.2 AMBULATORY DYSFUNCTION: ICD-10-CM

## 2024-11-04 DIAGNOSIS — G47.33 OSA (OBSTRUCTIVE SLEEP APNEA): ICD-10-CM

## 2024-11-04 DIAGNOSIS — I48.20 CHRONIC ATRIAL FIBRILLATION (HCC): ICD-10-CM

## 2024-11-04 DIAGNOSIS — Z95.0 PRESENCE OF PERMANENT CARDIAC PACEMAKER: Primary | ICD-10-CM

## 2024-11-04 DIAGNOSIS — I10 ESSENTIAL HYPERTENSION: ICD-10-CM

## 2024-11-04 DIAGNOSIS — Z86.73 HISTORY OF CVA (CEREBROVASCULAR ACCIDENT): ICD-10-CM

## 2024-11-04 DIAGNOSIS — E78.5 HYPERLIPIDEMIA LDL GOAL <100: Primary | ICD-10-CM

## 2024-11-04 PROCEDURE — 93279 PRGRMG DEV EVAL PM/LDLS PM: CPT | Performed by: STUDENT IN AN ORGANIZED HEALTH CARE EDUCATION/TRAINING PROGRAM

## 2024-11-04 PROCEDURE — 99214 OFFICE O/P EST MOD 30 MIN: CPT

## 2024-11-04 NOTE — ASSESSMENT & PLAN NOTE
- Continue taking losartan, nebivolol, verapamil, and spironolactone for antihypertensive therapy  - Advised to continue to follow with PCP/cardiology in regards to monitoring and further evaluation of hypertension in the future

## 2024-11-04 NOTE — ASSESSMENT & PLAN NOTE
- Continue Pradaxa 150 mg twice daily for anticoagulation therapy  - Advised patient to continue to follow with cardiology continue to follow with further pacemaker evaluation/monitoring for A-fib

## 2024-11-04 NOTE — PROGRESS NOTES
Results for orders placed or performed in visit on 11/04/24   Cardiac EP device report    Narrative    MDT-SINGLE CHAMBER PPM / NOT MRI CONDITIONAL  DEVICE INTERROGATED IN THE [place] OFFICE. BATTERY VOLTAGE ADEQUATE. (7.6 YRS)  68%. ALL LEAD PARAMETERS WITHIN NORMAL LIMITS. NO SIGNIFICANT HIGH RATE EPISODES. NO PROGRAMMING CHANGES MADE TO DEVICE PARAMETERS. NORMAL DEVICE FUNCTION.---OTERO

## 2024-11-04 NOTE — ASSESSMENT & PLAN NOTE
I had the pleasure of seeing Ade today in the office at [facility] in [place].  She is presenting today with her son who is interpreting for her today.  Patient reports no new strokelike symptoms.  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  This makes things challenging in regards to her walking and mobility.  I did place another referral to physical therapy for the patient to continue to work on her movement although did advise the patient that likely do not see her left foot drop continuing to improve.  But would like for the patient to continue to work on her walking.  The patient is still taking Pradaxa 150 mg twice daily and rosuvastatin 5 mg daily.  Patient does continue to follow with cardiology in regards to multiple cardiac comorbidities.  She had a carotid artery ultrasound back in March 2024 which showed less than 50% stenosis of bilateral internal carotid arteries.  At this time, patient is working on trying to stay as active as she can and trying to consistently eat.  She did acquire new CPAP machine recently as she did have a sleep study and follows with sleep medicine for severe obstructive sleep apnea.  Advised patient to continue to follow with [facility] moving forward.  At this time patient is doing quite well and not having any other issues.  Advised patient that she can come back and follow-up on a yearly basis with neurology.    - Can continue to monitor carotid artery ultrasound in the future, most recent less than 50% stenosis of the bilateral internal carotid arteries.  Can monitor on an every 1 to 2 years basis.  When the patient comes back for yearly follow-up next time we can certainly consider repeating this.  - Recommend that the patient also continue to follow with sleep medicine in regards to CPAP for RICH.  - Continue to follow with cardiology in regards to multiple cardiac comorbidities and monitoring and  management of the patient's pacemaker.  Would still continue with anticoagulation of Pradaxa 150 mg twice daily at this time for secondary stroke prevention.  - Placed ambulatory referral to physical therapy for ambulatory dysfunction.  Patient still having some difficulty with left foot drop and continuation of walking.  Certainly do not think that there will be any progression in regards to the patient's deficits but certainly would like to get the patient up and walking more as she has not been as of late.    - For ongoing stroke prevention continue: Pradaxa 150 mg twice daily, rosuvastatin 5 mg daily  - Discussed the importance of antiplatelet/anticoagulation management with the patient to prevent future strokes.   - Recommend to check blood pressure occasionally away from the doctor's office to make sure that those numbers are typically less than 130/80.  If they are frequently higher than that, we recommend checking a little more often and to follow up with primary care team   - Will defer to primary care team for monitoring of cholesterol panel and blood sugar numbers with target LDL cholesterol of less than 70 and hemoglobin A1c less than 7%  - Recommend following a low salt, mediterranean diet   - Recommend routine physical exercise as tolerated     Orders:    Ambulatory Referral to Physical Therapy; Future

## 2024-11-04 NOTE — ASSESSMENT & PLAN NOTE
- Recent LDL 50 mg/dL  - Continue rosuvastatin 5 mg daily  - Advised to follow-up with primary care provider in the future in regards to further monitoring of lipid panel

## 2024-11-04 NOTE — PATIENT INSTRUCTIONS
- Can continue to monitor carotid artery ultrasound in the future, most recent less than 50% stenosis of the bilateral internal carotid arteries.  Can monitor on an every 1 to 2 years basis.  When the patient comes back for yearly follow-up next time we can certainly consider repeating this.  - Recommend that the patient also continue to follow with sleep medicine in regards to CPAP for RICH.  - Continue to follow with cardiology in regards to multiple cardiac comorbidities and monitoring and management of the patient's pacemaker.  Would still continue with anticoagulation of Pradaxa 150 mg twice daily at this time for secondary stroke prevention.  - Placed ambulatory referral to physical therapy for ambulatory dysfunction.  Patient still having some difficulty with left foot drop and continuation of walking.  Certainly do not think that there will be any progression in regards to the patient's deficits but certainly would like to get the patient up and walking more as she has not been as of late.    - For ongoing stroke prevention continue: Pradaxa 150 mg twice daily, rosuvastatin 5 mg daily  - Discussed the importance of antiplatelet/anticoagulation management with the patient to prevent future strokes.   - Recommend to check blood pressure occasionally away from the doctor's office to make sure that those numbers are typically less than 130/80.  If they are frequently higher than that, we recommend checking a little more often and to follow up with primary care team   - Will defer to primary care team for monitoring of cholesterol panel and blood sugar numbers with target LDL cholesterol of less than 70 and hemoglobin A1c less than 7%  - Recommend following a low salt, mediterranean diet   - Recommend routine physical exercise as tolerated     We will plan for her to return to the office in 1 year time to see on of the APPs or Dr. Albarado but would be happy to see her sooner if the need should arise.  If she has  any symptoms concerning for TIA or stroke including sudden painless loss of vision or double vision, difficulty speaking or swallowing, vertigo/room spinning that does not quickly resolve, or weakness/numbness/loss of coordination affecting 1 side of the face or body she should proceed by ambulance to the nearest emergency room immediately.

## 2024-11-04 NOTE — ASSESSMENT & PLAN NOTE
- Recently noted severe RICH, patient working on getting new CPAP device  - Continue to follow with sleep medicine in regards to severe RICH

## 2024-11-04 NOTE — PROGRESS NOTES
Ambulatory Visit  Name: Ade Moon      : 1940      MRN: [id number]  Encounter Provider: Krish Haskins PA-C  Encounter Date: 2024   Encounter department: [facility]    Assessment & Plan  History of CVA (cerebrovascular accident)  I had the pleasure of seeing Ade today in the office at [facility] in [place].  She is presenting today with her son who is interpreting for her today.  Patient reports no new strokelike symptoms.  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  This makes things challenging in regards to her walking and mobility.  I did place another referral to physical therapy for the patient to continue to work on her movement although did advise the patient that likely do not see her left foot drop continuing to improve.  But would like for the patient to continue to work on her walking.  The patient is still taking Pradaxa 150 mg twice daily and rosuvastatin 5 mg daily.  Patient does continue to follow with cardiology in regards to multiple cardiac comorbidities.  She had a carotid artery ultrasound back in 2024 which showed less than 50% stenosis of bilateral internal carotid arteries.  At this time, patient is working on trying to stay as active as she can and trying to consistently eat.  She did acquire new CPAP machine recently as she did have a sleep study and follows with sleep medicine for severe obstructive sleep apnea.  Advised patient to continue to follow with [facility] moving forward.  At this time patient is doing quite well and not having any other issues.  Advised patient that she can come back and follow-up on a yearly basis with neurology.    - Can continue to monitor carotid artery ultrasound in the future, most recent less than 50% stenosis of the bilateral internal carotid arteries.  Can monitor on an every 1 to 2 years basis.  When the  patient comes back for yearly follow-up next time we can certainly consider repeating this.  - Recommend that the patient also continue to follow with sleep medicine in regards to CPAP for RICH.  - Continue to follow with cardiology in regards to multiple cardiac comorbidities and monitoring and management of the patient's pacemaker.  Would still continue with anticoagulation of Pradaxa 150 mg twice daily at this time for secondary stroke prevention.  - Placed ambulatory referral to physical therapy for ambulatory dysfunction.  Patient still having some difficulty with left foot drop and continuation of walking.  Certainly do not think that there will be any progression in regards to the patient's deficits but certainly would like to get the patient up and walking more as she has not been as of late.    - For ongoing stroke prevention continue: Pradaxa 150 mg twice daily, rosuvastatin 5 mg daily  - Discussed the importance of antiplatelet/anticoagulation management with the patient to prevent future strokes.   - Recommend to check blood pressure occasionally away from the doctor's office to make sure that those numbers are typically less than 130/80.  If they are frequently higher than that, we recommend checking a little more often and to follow up with primary care team   - Will defer to primary care team for monitoring of cholesterol panel and blood sugar numbers with target LDL cholesterol of less than 70 and hemoglobin A1c less than 7%  - Recommend following a low salt, mediterranean diet   - Recommend routine physical exercise as tolerated     Orders:    Ambulatory Referral to Physical Therapy; Future    Ambulatory dysfunction  - Physical therapy referral placed for ambulatory dysfunction/gait instability   Orders:    Ambulatory Referral to Physical Therapy; Future    Hyperlipidemia LDL goal <100  - Recent LDL 50 mg/dL  - Continue rosuvastatin 5 mg daily  - Advised to follow-up with primary care provider in the  future in regards to further monitoring of lipid panel       Chronic atrial fibrillation (HCC)  - Continue Pradaxa 150 mg twice daily for anticoagulation therapy  - Advised patient to continue to follow with cardiology continue to follow with further pacemaker evaluation/monitoring for A-fib       Essential hypertension  - Continue taking losartan, nebivolol, verapamil, and spironolactone for antihypertensive therapy  - Advised to continue to follow with PCP/cardiology in regards to monitoring and further evaluation of hypertension in the future       RICH (obstructive sleep apnea)  - Recently noted severe RICH, patient working on getting new CPAP device  - Continue to follow with sleep medicine in regards to severe RICH         Bilateral carotid artery disease (HCC)  - VAS carotid complete study, 03/14/2024: Less than 50% stenosis of the bilateral internal carotid arteries  - Advise repeat imaging in about 1 to 2 years time          Patient Instructions   - Can continue to monitor carotid artery ultrasound in the future, most recent less than 50% stenosis of the bilateral internal carotid arteries.  Can monitor on an every 1 to 2 years basis.  When the patient comes back for yearly follow-up next time we can certainly consider repeating this.  - Recommend that the patient also continue to follow with sleep medicine in regards to CPAP for RICH.  - Continue to follow with cardiology in regards to multiple cardiac comorbidities and monitoring and management of the patient's pacemaker.  Would still continue with anticoagulation of Pradaxa 150 mg twice daily at this time for secondary stroke prevention.  - Placed ambulatory referral to physical therapy for ambulatory dysfunction.  Patient still having some difficulty with left foot drop and continuation of walking.  Certainly do not think that there will be any progression in regards to the patient's deficits but certainly would like to get the patient up and walking more  as she has not been as of late.    - For ongoing stroke prevention continue: Pradaxa 150 mg twice daily, rosuvastatin 5 mg daily  - Discussed the importance of antiplatelet/anticoagulation management with the patient to prevent future strokes.   - Recommend to check blood pressure occasionally away from the doctor's office to make sure that those numbers are typically less than 130/80.  If they are frequently higher than that, we recommend checking a little more often and to follow up with primary care team   - Will defer to primary care team for monitoring of cholesterol panel and blood sugar numbers with target LDL cholesterol of less than 70 and hemoglobin A1c less than 7%  - Recommend following a low salt, mediterranean diet   - Recommend routine physical exercise as tolerated     We will plan for her to return to the office in 1 year time to see on of the APPs or Dr. Albarado but would be happy to see her sooner if the need should arise.  If she has any symptoms concerning for TIA or stroke including sudden painless loss of vision or double vision, difficulty speaking or swallowing, vertigo/room spinning that does not quickly resolve, or weakness/numbness/loss of coordination affecting 1 side of the face or body she should proceed by ambulance to the nearest emergency room immediately.    History of Present Illness   HPI    For Review:    The patient was last seen in the office on 02/15/2024 by myself.  At the time of the last appointment patient was following up for history of stroke.  Started the patient was doing well and not experiencing any new strokelike symptoms.  Was still having residual deficits of left lower extremity weakness and left upper extremity weakness.  Is noted that the patient had improved on neurologic exam.  Still have chronic left foot drop.  She was looking into potentially seeing physiatry for Botox injections for spasticity and foot drop.  Advised that I would see if we could have  the patient scheduled with physiatry at [facility] for further evaluation of Botox for chronic foot drop.  Is noted that in regards to the patient's intermittent foot pain that she was getting I did prescribe her gabapentin 100 mg that she can take up to 3 times daily if needed for foot pain.  Stented that the patient was still taking Pradaxa 150 mg twice daily and rosuvastatin 5 mg daily for secondary stroke prevention.  Patient did need to have a carotid artery ultrasound in the future for further evaluation of carotid arteries.  Encouraged the patient to continue to follow with sleep medicine in regards to potential sleep apnea need for CPAP in the future.  She was to also follow with cardiology in regards to her multiple cardiac comorbidities and for further evaluation and monitoring of her pacemaker in the future.      Interval History:    New stroke symptoms/residual symptoms:    Any new, sudden onset weakness, numbness, facial droop, slurred speech, difficulty speaking, trouble swallowing, persistent vertigo, or sudden double vision or vision loss? No new stroke-like symptoms    Residual symptoms include: weakness of the left UE/LE: upper extremity and lower extremity    Stroke Etiology and Risk Factor modification:    This was a(n) embolic stroke, most likely related to Cardioembolic: Non-Valvular A-Fib    Stroke risk factors were evaluated including: Atrial fibrillation, hypertension, hyperlipidemia     AP/AC therapy: Pradaxa 150 mg twice daily. No significant internal bleeding or bruising noted.     Statin therapy: Rosuvastatin 5 mg daily     Blood pressure today and as of late: 126/72 BP today in the office.     Most recent LDL: 50 mg/dL    Most recent hemoglobin A1C: 5.4%    Cardiology evaluation? Any cardiac monitoring required?: She is still following with cardiology for multiple cardiac comorbidities. She does have a fully functional pacemaker     Endocrinology evaluation? Following proper glycemic  treatment/diet?: None    Vascular surgery evaluation? Monitoring of carotid artery ultrasound required? VAS carotid artery ultrasound since last visit     Lifestyle history/modifications:    Diet/Exercise regimen: Has been eating well, trying to keep her consistently eating throughout the day. She is limited with activity, harder to walk with foot drop on the left side.     Any physical therapy, occupational therapy or speech therapy performed/required at this time?: No PT/OT at this time     Any difficulty with sleeping? Any history of sleep apnea? CPAP compliance?: New sleep apnea machine recently, continues to follow with sleep medicine     Post-stroke depression/anxiety? None    Any history of smoking or tobacco usage?: No history of smoking     Additional History:     Has still been experiencing issues in regards to chronic foot drop on the left side.  Advised the patient that she can continue to work on walking at home.  There is consideration of that the patient should continue with physical therapy for gait instability/ambulatory dysfunction as well.    Review of Systems   Constitutional:  Positive for fatigue. Negative for appetite change and fever.   HENT: Negative.  Negative for hearing loss, tinnitus, trouble swallowing and voice change.    Eyes: Negative.  Negative for photophobia, pain and visual disturbance.   Respiratory: Negative.  Negative for shortness of breath.    Cardiovascular: Negative.  Negative for palpitations.   Gastrointestinal: Negative.  Negative for nausea and vomiting.   Endocrine: Negative.  Negative for cold intolerance.   Genitourinary: Negative.  Negative for dysuria, frequency and urgency.   Musculoskeletal:  Negative for back pain, gait problem, myalgias, neck pain and neck stiffness.   Skin: Negative.  Negative for rash.   Allergic/Immunologic: Negative.    Neurological: Negative.  Negative for dizziness, tremors, seizures, syncope, facial asymmetry, speech difficulty,  weakness, light-headedness, numbness and headaches.   Hematological: Negative.  Does not bruise/bleed easily.   Psychiatric/Behavioral: Negative.  Negative for confusion, hallucinations and sleep disturbance.    All other systems reviewed and are negative.    I have personally reviewed the MA's review of systems and made changes as necessary.    Medical History Reviewed by provider this encounter:  Tobacco  Allergies  Meds  Problems  Med Hx  Surg Hx  Fam Hx       Past Medical History   Past Medical History:   Diagnosis Date    A-fib (HCC)     Anemia     Arthritis     Breast pain, right     Chest pain on breathing     Colon polyp     Cough     Diverticulosis     Encounter for screening colonoscopy     H/O sick sinus syndrome     Heart murmur     High cholesterol     History of atrial fibrillation     Rate ontrolled. On xarelto 15mg (creatinine 50) Followed by Dr. Randolph with Cardiology     History of weight loss     Report loose fitting clothes. Weight stable (3lbs difference). Last colo showed small tubular adenoma x2. Breast biopsy last showed fibrocystic changes. TSH wnl. Will check cbc, bmp, spep.        Hx of long term use of blood thinners     Hypertension     Irregular heart beat     RICH (obstructive sleep apnea)     Pacemaker     Preop examination     Shortness of breath     Viral bronchitis      Past Surgical History:   Procedure Laterality Date    BREAST BIOPSY Right 08/17/2006    ultrasound guided Percutaneous Needle Core -Benign    CATARACT EXTRACTION      CATARACT EXTRACTION W/ INTRAOCULAR LENS  IMPLANT, BILATERAL      COLONOSCOPY      COLONOSCOPY N/A 05/22/2018    Procedure: COLONOSCOPY;  Surgeon: Jenn Jang DO;  [facility];  Service: Gastroenterology    INSERT / REPLACE / REMOVE PACEMAKER      LEG SURGERY Right     as a child after a fall    MAMMO STEREOTACTIC BREAST BIOPSY RIGHT (ALL INC) Right 10/2014    benign    WY CMBND ANTERPOST COLPORRAPHY W/CYSTO N/A 03/17/2016     Procedure: COLPORRHAPHY ANTERIOR POSTERIOR ;  Surgeon: Dario Braden MD;  [facility];  Service: Gynecology    NV COLONOSCOPY FLX DX W/COLLJ SPEC WHEN PFRMD N/A 04/04/2019    Procedure: COLONOSCOPY;  Surgeon: Jenn Jang DO;  [facility];  Service: Gastroenterology    NV COLPOPEXY VAGINAL EXTRAPERITONEAL APPROACH N/A 03/17/2016    Procedure: COLPOPEXY VAGINAL EXTRAPERITONEAL (VEC) ANTERIOR ;  Surgeon: Dario Braden MD;  [facility];  Service: Gynecology    NV CYSTOURETHROSCOPY N/A 03/17/2016    Procedure: CYSTOSCOPY;  Surgeon: Dario Braden MD;  [facility];  Service: Gynecology    NV ESOPHAGOGASTRODUODENOSCOPY TRANSORAL DIAGNOSTIC N/A 04/16/2018    Procedure: EGD AND COLONOSCOPY;  Surgeon: Jenn Jang DO;  [facility];  Service: Gastroenterology    NV LAPAROSCOPY COLECTOMY PARTIAL W/ANASTOMOSIS Right 01/13/2022    Procedure: Diagnostic laparoscopy, laparscopic right colectomy;  Surgeon: Andriy Guevara MD;  [facility]: [facility];  Service: Colorectal    NV SLING OPERATION STRESS INCONTINENCE N/A 03/17/2016    Procedure: INSERTION PUBOVAGINAL SLING SINGLE INCISION ;  Surgeon: Dario Braden MD;  [facility];  Service: Gynecology     Family History   Problem Relation Age of Onset    Diabetes Mother     Breast cancer Sister 53    No Known Problems Father     No Known Problems Maternal Grandmother     No Known Problems Maternal Grandfather     No Known Problems Paternal Grandmother     No Known Problems Paternal Grandfather     No Known Problems Daughter     No Known Problems Son     No Known Problems Sister     No Known Problems Sister     No Known Problems Brother     No Known Problems Brother     No Known Problems Sister     No Known Problems Paternal Aunt     No Known Problems Paternal Aunt     No Known Problems Paternal Aunt     No Known Problems Paternal Aunt      Current Outpatient Medications on File Prior to Visit    Medication Sig Dispense Refill    acetaminophen (TYLENOL) 325 mg tablet Take 2 tablets (650 mg total) by mouth every 6 (six) hours as needed for mild pain  0    ammonium lactate (LAC-HYDRIN) 12 % cream APPLY TOPICALLY AS NEEDED FOR DRY SKIN 385 g 3    cyanocobalamin 1,000 mcg/mL INJECT 1 ML (1,000 MCG TOTAL) INTO A MUSCLE EVERY 30 (THIRTY) DAYS 3 mL 3    gabapentin (NEURONTIN) 100 mg capsule TAKE 1 CAPSULE (100 MG TOTAL) BY MOUTH 3 (THREE) TIMES A DAY AS NEEDED (AS NEEDED FOR LEFT FOOT PAIN) 90 capsule 1    Incontinence Supply Disposable (RA Adult Wipes) MISC Use 4 (four) times a day 64 each 10    Jardiance 10 MG TABS tablet TAKE 1 TABLET (10 MG TOTAL) BY MOUTH EVERY MORNING 90 tablet 5    losartan (COZAAR) 50 mg tablet Take 1 tablet (50 mg total) by mouth 2 (two) times a day 180 tablet 5    nebivolol (BYSTOLIC) 5 mg tablet TAKE 1 TABLET (5 MG TOTAL) BY MOUTH DAILY 90 tablet 3    pantoprazole (PROTONIX) 40 mg tablet TAKE 1 TABLET (40 MG TOTAL) BY MOUTH DAILY IN THE EARLY MORNING 90 tablet 3    Pradaxa 150 MG capsu SB JUAN ANTONIO CAPSULA POR VIA ORAL DOS VECES AL KATRINA 180 capsule 3    rosuvastatin (CRESTOR) 5 mg tablet TAKE 1 TABLET (5 MG TOTAL) BY MOUTH DAILY 90 tablet 3    spironolactone (ALDACTONE) 25 mg tablet Take 1 tablet (25 mg total) by mouth daily 90 tablet 5    verapamil (CALAN-SR) 240 mg CR tablet TAKE 1 TABLET (240 MG TOTAL) BY MOUTH DAILY AT BEDTIME 90 tablet 3    Blood Pressure Monitoring (Blood Pressure Cuff) MISC Use every other day For HTN (Patient not taking: Reported on 8/15/2023) 1 each 0    nitroglycerin (NITROSTAT) 0.4 mg SL tablet Place 1 tablet (0.4 mg total) under the tongue every 5 (five) minutes as needed for chest pain (Patient not taking: Reported on 8/3/2023)  0    [DISCONTINUED] apixaban (Eliquis) 5 mg Take 1 tablet (5 mg total) by mouth 2 (two) times a day 60 tablet 0     Current Facility-Administered Medications on File Prior to Visit   Medication Dose Route Frequency Provider Last Rate  Last Admin    cyanocobalamin injection 1,000 mcg  1,000 mcg Intramuscular Q30 Days Deepak Hermosillo MD   1,000 mcg at 10/16/24 0949     Allergies   Allergen Reactions    Other Itching     Bandaids    Tape [Medical Tape] Itching      Current Outpatient Medications on File Prior to Visit   Medication Sig Dispense Refill    acetaminophen (TYLENOL) 325 mg tablet Take 2 tablets (650 mg total) by mouth every 6 (six) hours as needed for mild pain  0    ammonium lactate (LAC-HYDRIN) 12 % cream APPLY TOPICALLY AS NEEDED FOR DRY SKIN 385 g 3    cyanocobalamin 1,000 mcg/mL INJECT 1 ML (1,000 MCG TOTAL) INTO A MUSCLE EVERY 30 (THIRTY) DAYS 3 mL 3    gabapentin (NEURONTIN) 100 mg capsule TAKE 1 CAPSULE (100 MG TOTAL) BY MOUTH 3 (THREE) TIMES A DAY AS NEEDED (AS NEEDED FOR LEFT FOOT PAIN) 90 capsule 1    Incontinence Supply Disposable (RA Adult Wipes) MISC Use 4 (four) times a day 64 each 10    Jardiance 10 MG TABS tablet TAKE 1 TABLET (10 MG TOTAL) BY MOUTH EVERY MORNING 90 tablet 5    losartan (COZAAR) 50 mg tablet Take 1 tablet (50 mg total) by mouth 2 (two) times a day 180 tablet 5    nebivolol (BYSTOLIC) 5 mg tablet TAKE 1 TABLET (5 MG TOTAL) BY MOUTH DAILY 90 tablet 3    pantoprazole (PROTONIX) 40 mg tablet TAKE 1 TABLET (40 MG TOTAL) BY MOUTH DAILY IN THE EARLY MORNING 90 tablet 3    Pradaxa 150 MG capsu SB JUAN ANTONIO CAPSULA POR VIA ORAL DOS VECES AL KATRINA 180 capsule 3    rosuvastatin (CRESTOR) 5 mg tablet TAKE 1 TABLET (5 MG TOTAL) BY MOUTH DAILY 90 tablet 3    spironolactone (ALDACTONE) 25 mg tablet Take 1 tablet (25 mg total) by mouth daily 90 tablet 5    verapamil (CALAN-SR) 240 mg CR tablet TAKE 1 TABLET (240 MG TOTAL) BY MOUTH DAILY AT BEDTIME 90 tablet 3    Blood Pressure Monitoring (Blood Pressure Cuff) MISC Use every other day For HTN (Patient not taking: Reported on 8/15/2023) 1 each 0    nitroglycerin (NITROSTAT) 0.4 mg SL tablet Place 1 tablet (0.4 mg total) under the tongue every 5 (five) minutes as needed for chest  pain (Patient not taking: Reported on 8/3/2023)  0    [DISCONTINUED] apixaban (Eliquis) 5 mg Take 1 tablet (5 mg total) by mouth 2 (two) times a day 60 tablet 0     Current Facility-Administered Medications on File Prior to Visit   Medication Dose Route Frequency Provider Last Rate Last Admin    cyanocobalamin injection 1,000 mcg  1,000 mcg Intramuscular Q30 Days Deepak Hermosillo MD   1,000 mcg at 10/16/24 0949      Social History     Tobacco Use    Smoking status: Never    Smokeless tobacco: Never   Vaping Use    Vaping status: Never Used   Substance and Sexual Activity    Alcohol use: Never    Drug use: No    Sexual activity: Not Currently     Comment:      Objective     There were no vitals taken for this visit.    Physical Exam  Neurologic Exam    Physical Exam:                                                                 Vitals:            Constitutional:    /72 (BP Location: Left arm, Patient Position: Sitting, Cuff Size: Adult)   Pulse 82   Temp 98 °F (36.7 °C) (Temporal)   Wt 65.7 kg (144 lb 14.4 oz)   SpO2 97%   BMI 27.38 kg/m²   BP Readings from Last 3 Encounters:   11/04/24 126/72   10/31/24 139/57   10/02/24 145/65     Pulse Readings from Last 3 Encounters:   11/04/24 82   10/31/24 57   10/02/24 64         Well developed, well nourished, well groomed. No dysmorphic features.       Psychiatric:  Normal behavior and appropriate affect        Neurological Examination:     Mental status/cognitive function:   Orientated to time, place and person. Recent and remote memory intact. Attention span and concentration as well as fund of knowledge are appropriate for age. Normal language and spontaneous speech.    Cranial Nerves:  II-visual fields full.   III, IV, VI-Pupils were equal, round, and reactive to light and accomodation. Extraocular movements were full and conjugate without nystagmus. Conjugate gaze, normal smooth pursuits, normal saccades   V-facial sensation symmetric.    VII-facial  expression symmetric, intact forehead wrinkle, strong eye closure, symmetric smile    VIII-hearing grossly intact bilaterally   IX, X-palate elevation symmetric, no dysarthria.   XI-shoulder shrug strength intact    XII-tongue protrusion midline.    Motor Exam: symmetric bulk and tone throughout, no pronator drift. Power/strength 5/5 bilateral upper and lower extremities, spasticity of the left lower extremity with chronic left foot drop noted.  Sensory: grossly intact light touch in all extremities.   Reflexes: brachioradialis 2+, biceps 2+, knee 2+ bilaterally  Coordination: Finger nose finger intact bilaterally, no apparent dysmetria, ataxia or tremor noted  Gait: Uses a walker currently, slightly unsteady gait.    Results/Data:     VAS carotid complete study, 03/14/2024:    Impression  RIGHT:  There is <50% stenosis noted in the internal carotid artery. Plaque is  heterogenous and irregular.  Vertebral artery flow is antegrade. There is no significant subclavian artery  disease.     LEFT:  There is <50% stenosis noted in the internal carotid artery. Plaque is  heterogenous and irregular.  Vertebral artery flow is antegrade. There is no significant subclavian artery  disease.     Compared to previous study on 8/27/13, there is now <50% stenosis bilaterally.    I have reviewed radiology reports from 03/14/2024 including: Ultrasound(s).    Administrative Statements   I have spent a total time of 30 minutes in caring for this patient on the day of the visit/encounter including Diagnostic results, Risks and benefits of tx options, Instructions for management, Patient and family education, Importance of tx compliance, Risk factor reductions, Impressions, Counseling / Coordination of care, Documenting in the medical record, Reviewing / ordering tests, medicine, procedures  , and Obtaining or reviewing history  .

## 2024-11-06 ENCOUNTER — TELEPHONE (OUTPATIENT)
Dept: INTERNAL MEDICINE CLINIC | Facility: CLINIC | Age: 84
End: 2024-11-06

## 2024-11-06 NOTE — TELEPHONE ENCOUNTER
Patient son came in to bring his FMLA for his mom Ade.HR is requesting extended excplanation on page 4 question 7 which is highlighted. Please fax to [phone]..    Patient son Donal will  the hard copy just call and let him know when completed.

## 2024-11-07 DIAGNOSIS — I50.30 HEART FAILURE WITH PRESERVED EJECTION FRACTION, UNSPECIFIED HF CHRONICITY (HCC): ICD-10-CM

## 2024-11-07 RX ORDER — SPIRONOLACTONE 25 MG/1
25 TABLET ORAL DAILY
Qty: 90 TABLET | Refills: 1 | Status: SHIPPED | OUTPATIENT
Start: 2024-11-07 | End: 2026-05-01

## 2024-11-11 ENCOUNTER — OFFICE VISIT (OUTPATIENT)
Dept: MULTI SPECIALTY CLINIC | Facility: CLINIC | Age: 84
End: 2024-11-11

## 2024-11-11 VITALS
BODY MASS INDEX: 27.78 KG/M2 | SYSTOLIC BLOOD PRESSURE: 151 MMHG | HEART RATE: 83 BPM | WEIGHT: 147 LBS | TEMPERATURE: 98.1 F | DIASTOLIC BLOOD PRESSURE: 78 MMHG

## 2024-11-11 DIAGNOSIS — M79.674 PAIN DUE TO ONYCHOMYCOSIS OF TOENAILS OF BOTH FEET: ICD-10-CM

## 2024-11-11 DIAGNOSIS — M79.675 PAIN DUE TO ONYCHOMYCOSIS OF TOENAILS OF BOTH FEET: ICD-10-CM

## 2024-11-11 DIAGNOSIS — N18.31 CHRONIC KIDNEY DISEASE-MINERAL BONE DISORDER (CKD-MBD) WITH STAGE 3A CHRONIC KIDNEY DISEASE (HCC): ICD-10-CM

## 2024-11-11 DIAGNOSIS — E83.9 CHRONIC KIDNEY DISEASE-MINERAL BONE DISORDER (CKD-MBD) WITH STAGE 3A CHRONIC KIDNEY DISEASE (HCC): ICD-10-CM

## 2024-11-11 DIAGNOSIS — B35.1 PAIN DUE TO ONYCHOMYCOSIS OF TOENAILS OF BOTH FEET: ICD-10-CM

## 2024-11-11 DIAGNOSIS — B35.1 ONYCHOMYCOSIS: ICD-10-CM

## 2024-11-11 DIAGNOSIS — M21.372 LEFT FOOT DROP: Primary | ICD-10-CM

## 2024-11-11 DIAGNOSIS — M89.9 CHRONIC KIDNEY DISEASE-MINERAL BONE DISORDER (CKD-MBD) WITH STAGE 3A CHRONIC KIDNEY DISEASE (HCC): ICD-10-CM

## 2024-11-11 NOTE — PROGRESS NOTES
Podiatry Clinic Visit  Ade Moon 84 y.o. 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  Nails x 10 debrided without incident  Discussed dropfoot conservative treatment options. Patient mentions she stopped wearing her previous brace because the metal would rub on her skin and cause her pain. She would benefit from physical therapy to stretch her posterior compartment of the left calf as she is in semirigid equinus  Script for dropfoot brace printed out and handed to patient. Patient was told to follow with [facility] for production of a new dropfoot brace. Patient says that she believes her insurance would cover a new brace for this time.  Prescription for physical therapy was  printed and handed to patient for stretching of posterior compartment in the setting of flexible dropfoot deformity.  Discussed importance of checking feet for wounds especially in the case of dropfoot as her toes are significantly plantarflexed.   Patient was re-appointed for 3 months          Nail debridement     Date/Time  11/11/2024 2:00 PM     Performed by  Seferino Kumar DPM   Authorized by  Seferino Kumar DPM     Universal Protocol   Consent: Verbal consent obtained.  Risks and benefits: risks, benefits and alternatives were discussed  Consent given by: patient  Patient understanding: patient states understanding of the procedure being performed  Patient consent: the patient's understanding of the procedure matches consent given  Procedure consent: procedure consent matches procedure scheduled     Procedure Details   Procedure Notes: Nails x 10 were debrided with a sterile nail nipper without incident.              - Dr. Roberson  was available/present for entirety of patient encounter and present for all procedures.      History of Present Illness     HPI: Ade Moon is a 84 y.o. female who presents for repeated nail care as well as her drop foot deformity. 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 She used to wear a brace for it but mentions that the metal in the brace was starting to make contact with her skin and causing her pain. The patient has no further podiatric complaints at this time.      Review of Systems   Constitutional: Negative.    HENT: Negative.    Eyes: Negative.    Respiratory: Negative.    Cardiovascular: Negative.    Gastrointestinal: Negative.    Musculoskeletal: Left dropfoot  Skin: Bilateral elongated nails  Neurological: Negative.        Historical Information   Past Medical History:   Diagnosis Date    A-fib (HCC)     Anemia     Arthritis     Breast pain, right     Chest pain on breathing      Colon polyp     Cough     Diverticulosis     Encounter for screening colonoscopy     H/O sick sinus syndrome     Heart murmur     High cholesterol     History of atrial fibrillation     Rate ontrolled. On xarelto 15mg (creatinine 50) Followed by Dr. Randolph with Cardiology     History of weight loss     Report loose fitting clothes. Weight stable (3lbs difference). Last colo showed small tubular adenoma x2. Breast biopsy last showed fibrocystic changes. TSH wnl. Will check cbc, bmp, spep.        Hx of long term use of blood thinners     Hypertension     Irregular heart beat     RICH (obstructive sleep apnea)     Pacemaker     Preop examination     Shortness of breath     Viral bronchitis      Past Surgical History:   Procedure Laterality Date    BREAST BIOPSY Right 08/17/2006    ultrasound guided Percutaneous Needle Core -Benign    CATARACT EXTRACTION      CATARACT EXTRACTION W/ INTRAOCULAR LENS  IMPLANT, BILATERAL      COLONOSCOPY      COLONOSCOPY N/A 05/22/2018    Procedure: COLONOSCOPY;  Surgeon: Jenn Jang DO;  [facility];  Service: Gastroenterology    INSERT / REPLACE / REMOVE PACEMAKER      LEG SURGERY Right     as a child after a fall    MAMMO STEREOTACTIC BREAST BIOPSY RIGHT (ALL INC) Right 10/2014    benign    WV CMBND ANTERPOST COLPORRAPHY W/CYSTO N/A 03/17/2016    Procedure: COLPORRHAPHY ANTERIOR POSTERIOR ;  Surgeon: Dario Braden MD;  [facility];  Service: Gynecology    WV COLONOSCOPY FLX DX W/COLLJ SPEC WHEN PFRMD N/A 04/04/2019    Procedure: COLONOSCOPY;  Surgeon: Jenn Jang DO;  [facility];  Service: Gastroenterology    WV COLPOPEXY VAGINAL EXTRAPERITONEAL APPROACH N/A 03/17/2016    Procedure: COLPOPEXY VAGINAL EXTRAPERITONEAL (VEC) ANTERIOR ;  Surgeon: Dario Braden MD;  [facility];  Service: Gynecology    WV CYSTOURETHROSCOPY N/A 03/17/2016    Procedure: CYSTOSCOPY;  Surgeon: Dario Braden MD;  [facility];  Service:  Gynecology    MS ESOPHAGOGASTRODUODENOSCOPY TRANSORAL DIAGNOSTIC N/A 04/16/2018    Procedure: EGD AND COLONOSCOPY;  Surgeon: Jenn Jang DO;  [facility];  Service: Gastroenterology    MS LAPAROSCOPY COLECTOMY PARTIAL W/ANASTOMOSIS Right 01/13/2022    Procedure: Diagnostic laparoscopy, laparscopic right colectomy;  Surgeon: Andriy Guevara MD;  [facility];  Service: Colorectal    MS SLING OPERATION STRESS INCONTINENCE N/A 03/17/2016    Procedure: INSERTION PUBOVAGINAL SLING SINGLE INCISION ;  Surgeon: Dario Braden MD;  [facility];  Service: Gynecology     Social History   Social History     Substance and Sexual Activity   Alcohol Use Never     Social History     Substance and Sexual Activity   Drug Use No     Social History     Tobacco Use   Smoking Status Never   Smokeless Tobacco Never     Family History:   Family History   Problem Relation Age of Onset    Diabetes Mother     Breast cancer Sister 53    No Known Problems Father     No Known Problems Maternal Grandmother     No Known Problems Maternal Grandfather     No Known Problems Paternal Grandmother     No Known Problems Paternal Grandfather     No Known Problems Daughter     No Known Problems Son     No Known Problems Sister     No Known Problems Sister     No Known Problems Brother     No Known Problems Brother     No Known Problems Sister     No Known Problems Paternal Aunt     No Known Problems Paternal Aunt     No Known Problems Paternal Aunt     No Known Problems Paternal Aunt        Meds/Allergies   Not in a hospital admission.  Allergies   Allergen Reactions    Other Itching     Bandaids    Tape [Medical Tape] Itching       Objective     Current Vitals:   Blood Pressure: 151/78 (11/11/24 1323)  Pulse: 83 (11/11/24 1323)  Temperature: 98.1 °F (36.7 °C) (11/11/24 1323)  Temp Source: Temporal (11/11/24 1323)  Weight - Scale: 66.7 kg (147 lb) (11/11/24 1323)        /78 (BP Location: Right arm, Patient  Position: Sitting, Cuff Size: Large)   Pulse 83   Temp 98.1 °F (36.7 °C) (Temporal)   Wt 66.7 kg (147 lb)   BMI 27.78 kg/m²       Lower Extremity Exam:    Foot Exam    Musculoskeletal: Left foot drop foot, semirigid, able to get about 5 degrees of dorsiflexion passively.  Digits of left foot are semirigid Bernardino plantarflexed     Vascular:   DP pulses and PT pulses are present bilaterally., CFT< 3sec to all digits. Pedal hair is Present. Pulse has irregular rate and rhythm. Skin temperature warm to warm B/L.     Dermatological:  No open Lesions. Skin of the LE is normal texture, temperature, turgor. Interdigital maceration is not present.   Nails x 10 are thickened, elongated, painful to palpation       Neurologic:  Gross sensation is intact. Protective sensation is Diminished. Vibratory sensation is Intact. Sharp sensation is present.

## 2024-11-12 ENCOUNTER — RA CDI HCC (OUTPATIENT)
Dept: OTHER | Facility: HOSPITAL | Age: 84
End: 2024-11-12

## 2024-11-14 DIAGNOSIS — K21.9 GASTROESOPHAGEAL REFLUX DISEASE WITHOUT ESOPHAGITIS: Chronic | ICD-10-CM

## 2024-11-14 RX ORDER — PANTOPRAZOLE SODIUM 40 MG/1
40 TABLET, DELAYED RELEASE ORAL
Qty: 90 TABLET | Refills: 3 | Status: SHIPPED | OUTPATIENT
Start: 2024-11-14

## 2024-11-18 ENCOUNTER — CLINICAL SUPPORT (OUTPATIENT)
Dept: INTERNAL MEDICINE CLINIC | Facility: CLINIC | Age: 84
End: 2024-11-18

## 2024-11-18 DIAGNOSIS — E53.8 B12 DEFICIENCY: Primary | Chronic | ICD-10-CM

## 2024-11-18 DIAGNOSIS — I48.20 CHRONIC ATRIAL FIBRILLATION (HCC): ICD-10-CM

## 2024-11-18 RX ORDER — ATENOLOL 100 MG/1
TABLET ORAL
Qty: 180 CAPSULE | Refills: 3 | Status: SHIPPED | OUTPATIENT
Start: 2024-11-18

## 2024-11-18 RX ADMIN — CYANOCOBALAMIN 1000 MCG: 1000 INJECTION, SOLUTION INTRAMUSCULAR; SUBCUTANEOUS at 09:38

## 2024-11-18 NOTE — PROGRESS NOTES
Patient came into the office for Vit B12 injection. Patient provided serum and 1.0 ml was given in right deltoid. Patient to return in one month for next injection.

## 2024-11-22 ENCOUNTER — TELEPHONE (OUTPATIENT)
Dept: INTERNAL MEDICINE CLINIC | Facility: CLINIC | Age: 84
End: 2024-11-22

## 2024-11-22 DIAGNOSIS — E78.5 HYPERLIPIDEMIA LDL GOAL <100: ICD-10-CM

## 2024-11-22 RX ORDER — ROSUVASTATIN CALCIUM 5 MG/1
5 TABLET, COATED ORAL DAILY
Qty: 90 TABLET | Refills: 3 | Status: SHIPPED | OUTPATIENT
Start: 2024-11-22

## 2024-11-22 NOTE — TELEPHONE ENCOUNTER
[employer] was speaking to patient and she mentioned that he wanted home physical therapy because her legs were weak.     The insurance told that patient that she would relay the message to her provider.    Please advise and if needed contact patient directly

## 2024-11-26 ENCOUNTER — PATIENT OUTREACH (OUTPATIENT)
Dept: INTERNAL MEDICINE CLINIC | Facility: CLINIC | Age: 84
End: 2024-11-26

## 2024-11-26 ENCOUNTER — TELEPHONE (OUTPATIENT)
Dept: INTERNAL MEDICINE CLINIC | Facility: CLINIC | Age: 84
End: 2024-11-26

## 2024-11-26 DIAGNOSIS — Z78.9 NEEDS ASSISTANCE WITH COMMUNITY RESOURCES: Primary | ICD-10-CM

## 2024-11-26 NOTE — TELEPHONE ENCOUNTER
Son Donal is here to speak about assisted living. Patient had a stroke 2 weeks ago and has been falling   [phone]

## 2024-11-26 NOTE — PROGRESS NOTES
SW has reached out to pt's son Donal Moon as per his request for info on community resources.  Son is on pt's  Communication Consent.  Son shares that pt has had recent falls at home.  Pt/family has previously asked PA Waiver for additional hours which were increased but are not 24/7.  Mis did suggest they can speak to the  again re same.  Sw has also reviewed other community resources.  Family to review with pt and get back to Sw for assistance as indicated.

## 2024-11-29 NOTE — TELEPHONE ENCOUNTER
After Visit Summary   8/14/2017    Jonathon Guerra    MRN: [id number]           Patient Information     Date Of Birth          1947        Visit Information        Provider Department      8/14/2017 4:00 PM Lorna Quiles APRN CNP [facility]        Today's Diagnoses     Travel advice encounter    -  1    Need for vaccination          Care Instructions    Today August 14, 2017 you received the    Rabies Vaccine - Please return on 8/21/17 for your 2nd dose and 9/4/2017 or 09/11/2017  for your 3rd and final dose.    Typhoid - injectable. This vaccine is valid for two years.   .    These appointments can be made as a NURSE ONLY visit.    **It is very important for the vaccinations to be given on the scheduled day(s), this helps ensure you receive the full effectiveness of the vaccine.**    Please call [facility] with any questions [phone]    Thank you for visiting [facility]              Follow-ups after your visit        Your next 10 appointments already scheduled     Aug 17, 2017  8:00 AM CDT   LAB with [facility]   [facility] AT [place] ([facility])    [address] [place] [id number] [phone]           Patient must bring picture ID. Patient should be prepared to give a urine specimen  Please do not eat 10-12 hours before your appointment if you are coming in fasting for labs on lipids, cholesterol, or glucose (sugar). Pregnant women should follow their Care Team instructions. Water with medications is okay. Do not drink coffee or other fluids. If you have concerns about taking  your medications, please ask at office or if scheduling via [employer], send a message by clicking on Secure Messaging, Message Your Care Team.            Aug 17, 2017  8:30 AM CDT   Ech Stress Test with [id number]   [facility] CV Echocardiography (Cardiovascular Imaging at [facility]  Can you place order for home PT eval and treat   [facility])    [address]  [address]  [place] [id number] [phone]           1. Please bring or wear a comfortable two-piece outfit and walking shoes. 2. Stop eating 3 hours before the test. You may drink water or juice. 3. Stop all caffeine 12 hours before the test. This includes coffee, tea, soda pop, chocolate and certain medicines (such as Anacin and Excederin). Also avoid decaf coffee and tea, as these contain small amounts of caffeine. 4. No alcohol, smoking or use of other tobacco products for 12 hours before the test. 5. Refer to your provider instructions to see if you need to stop any medications (such as beta-blockers or nitrates) for this test. 6. For patients with diabetes: - If you take insulin, call your diabetes care team. Ask if you should take a   dose the morning of your test. - If you take diabetes medicine by mouth, dont take it on the morning of your test. Bring it with you to take after the test. (If you have questions, call your diabetes care team) 7. When you arrive, please tell us if: - You have diabetes. - You have taken Viagra, Cialis or Levitra in the past 48 hours. 8. For any questions that cannot be answered, please contact the ordering physician            Aug 30, 2017  8:15 AM CDT   Return Visit with Jorge Luis Westbrook MD   [facility] PHYSICIANS [facility]ART AT [place] ([facility])    [address]  [place] [id number] [phone]              Future tests that were ordered for you today     Open Standing Orders        Priority Remaining Interval Expires Ordered    RABIES VACCINE, IM Routine 2/3  8/14/2018 8/14/2017            Who to contact     If you have questions or need follow up information about today's clinic visit or your schedule please contact [facility] directly at [phone].  Normal or non-critical lab and imaging results will be communicated to you by MyChart, letter or phone within 4 business days after  the clinic has received the results. If you do not hear from us within 7 days, please contact the clinic through [employer] or phone. If you have a critical or abnormal lab result, we will notify you by phone as soon as possible.  Submit refill requests through [employer] or call your pharmacy and they will forward the refill request to us. Please allow 3 business days for your refill to be completed.          Additional Information About Your Visit        Telepoh[employer] Information     [employer] gives you secure access to your electronic health record. If you see a primary care provider, you can also send messages to your care team and make appointments. If you have questions, please call your primary care clinic.  If you do not have a primary care provider, please call [phone] and they will assist you.        Care EveryWhere ID     This is your Care EveryWhere ID. This could be used by other organizations to access your [place] medical records  [id number]        Your Vitals Were     Pulse Temperature                60 97.3  F (36.3  C) (Oral)           Blood Pressure from Last 3 Encounters:   08/14/17 107/72   08/02/17 96/68   09/20/16 102/72    Weight from Last 3 Encounters:   08/02/17 191 lb 8 oz (86.9 kg)   09/20/16 203 lb (92.1 kg)   06/29/16 203 lb (92.1 kg)              We Performed the Following     Hepatitis Antibody A IgG     Hepatitis B Surface Antibody     Mumps Antibody IgG     Rubella Antibody IgG Quantitative     Rubeola Antibody IgG     TYPHOID VACCINE, IM        Primary Care Provider Office Phone # Fax #    Perez Keeley Beckett [phone] [address] [facility] [address]        Equal Access to Services     [facility]: Hadii aad ku hadasho Soomaali, waaxda luqadaha, qaybta kaalmada adechristelle, alexsander downs. So [facility] [phone].    ATENCIÓN: Si habla español, tiene a schwarz disposición servicios gratuitos de asistencia lingüística. Llame al  [phone].    We comply with applicable federal civil rights laws and [place] laws. We do not discriminate on the basis of race, color, national origin, age, disability sex, sexual orientation or gender identity.            Thank you!     Thank you for choosing [facility]  for your care. Our goal is always to provide you with excellent care. Hearing back from our patients is one way we can continue to improve our services. Please take a few minutes to complete the written survey that you may receive in the mail after your visit with us. Thank you!             Your Updated Medication List - Protect others around you: Learn how to safely use, store and throw away your medicines at www.disposemymeds.org.          This list is accurate as of: 8/14/17  5:19 PM.  Always use your most recent med list.                   Brand Name Dispense Instructions for use Diagnosis    acetaminophen 650 MG 8 hour tablet      Take by mouth daily        ACETAMINOPHEN PM PO      Take 500 mg by mouth        aspirin 325 MG tablet      Take 325 mg by mouth daily.        atorvastatin 40 MG tablet    LIPITOR    90 tablet    Take 1 tablet (40 mg) by mouth daily    Hypercholesterolemia       cholecalciferol 1000 UNIT tablet    vitamin D     Take 1 tablet by mouth daily        ciclopirox 0.77 % cream    LOPROX          ketoconazole 2 % cream    NIZORAL    15 g    APPLY TOPICALLY 2 TIMES DAILY    Tinea corporis       levothyroxine 100 MCG tablet    SYNTHROID/LEVOTHROID    90 tablet    TAKE ONE TABLET BY MOUTH DAILY.    Acquired hypothyroidism       MELATONIN PO      Take 10 mg by mouth        metoprolol 25 MG 24 hr tablet    TOPROL-XL     1/2 tablet daily        MULTI-VITAMIN PO      Take by mouth daily        NITROSTAT SL      Place 0.4 mg under the tongue.        sotalol 80 MG tablet    BETAPACE     Take 1 tablet by mouth 2 times daily.        triamcinolone 0.5 % cream    KENALOG    30 g    Apply sparingly to affected area three  times daily.    Rash

## 2024-11-30 DIAGNOSIS — M21.372 LEFT FOOT DROP: Primary | ICD-10-CM

## 2024-12-02 ENCOUNTER — APPOINTMENT (OUTPATIENT)
Dept: LAB | Facility: HOSPITAL | Age: 84
End: 2024-12-02
Payer: MEDICARE

## 2024-12-02 DIAGNOSIS — N17.9 AKI (ACUTE KIDNEY INJURY) (HCC): ICD-10-CM

## 2024-12-02 LAB
ANION GAP SERPL CALCULATED.3IONS-SCNC: 8 MMOL/L (ref 4–13)
BACTERIA UR QL AUTO: ABNORMAL /HPF
BILIRUB UR QL STRIP: NEGATIVE
BUN SERPL-MCNC: 25 MG/DL (ref 5–25)
CALCIUM SERPL-MCNC: 9.4 MG/DL (ref 8.4–10.2)
CHLORIDE SERPL-SCNC: 103 MMOL/L (ref 96–108)
CLARITY UR: CLEAR
CO2 SERPL-SCNC: 26 MMOL/L (ref 21–32)
COLOR UR: YELLOW
CREAT SERPL-MCNC: 1.28 MG/DL (ref 0.6–1.3)
CREAT UR-MCNC: 133.5 MG/DL
GFR SERPL CREATININE-BSD FRML MDRD: 38 ML/MIN/1.73SQ M
GLUCOSE P FAST SERPL-MCNC: 86 MG/DL (ref 65–99)
GLUCOSE UR STRIP-MCNC: ABNORMAL MG/DL
HGB UR QL STRIP.AUTO: ABNORMAL
KETONES UR STRIP-MCNC: NEGATIVE MG/DL
LEUKOCYTE ESTERASE UR QL STRIP: ABNORMAL
MICROALBUMIN UR-MCNC: 26.6 MG/L
MICROALBUMIN/CREAT 24H UR: 20 MG/G CREATININE (ref 0–30)
NITRITE UR QL STRIP: NEGATIVE
NON-SQ EPI CELLS URNS QL MICRO: ABNORMAL /HPF
PH UR STRIP.AUTO: 6 [PH]
POTASSIUM SERPL-SCNC: 4.6 MMOL/L (ref 3.5–5.3)
PROT UR STRIP-MCNC: ABNORMAL MG/DL
RBC #/AREA URNS AUTO: ABNORMAL /HPF
SODIUM SERPL-SCNC: 137 MMOL/L (ref 135–147)
SP GR UR STRIP.AUTO: 1.02 (ref 1–1.03)
UROBILINOGEN UR STRIP-ACNC: 2 MG/DL
WBC #/AREA URNS AUTO: ABNORMAL /HPF

## 2024-12-02 PROCEDURE — 80048 BASIC METABOLIC PNL TOTAL CA: CPT

## 2024-12-02 PROCEDURE — 82570 ASSAY OF URINE CREATININE: CPT

## 2024-12-02 PROCEDURE — 36415 COLL VENOUS BLD VENIPUNCTURE: CPT

## 2024-12-02 PROCEDURE — 82043 UR ALBUMIN QUANTITATIVE: CPT

## 2024-12-02 PROCEDURE — 81001 URINALYSIS AUTO W/SCOPE: CPT

## 2024-12-10 ENCOUNTER — OFFICE VISIT (OUTPATIENT)
Dept: NEPHROLOGY | Facility: CLINIC | Age: 84
End: 2024-12-10
Payer: MEDICARE

## 2024-12-10 VITALS — WEIGHT: 143 LBS | DIASTOLIC BLOOD PRESSURE: 62 MMHG | SYSTOLIC BLOOD PRESSURE: 108 MMHG | BODY MASS INDEX: 27.02 KG/M2

## 2024-12-10 DIAGNOSIS — N18.32 CHRONIC KIDNEY DISEASE-MINERAL BONE DISORDER (CKD-MBD) WITH STAGE 3B CHRONIC KIDNEY DISEASE (HCC): ICD-10-CM

## 2024-12-10 DIAGNOSIS — I12.9 BENIGN HYPERTENSION WITH CHRONIC KIDNEY DISEASE, STAGE III (HCC): ICD-10-CM

## 2024-12-10 DIAGNOSIS — E87.3 METABOLIC ALKALOSIS: ICD-10-CM

## 2024-12-10 DIAGNOSIS — M89.9 CHRONIC KIDNEY DISEASE-MINERAL BONE DISORDER (CKD-MBD) WITH STAGE 3B CHRONIC KIDNEY DISEASE (HCC): ICD-10-CM

## 2024-12-10 DIAGNOSIS — I50.32 CHRONIC HEART FAILURE WITH PRESERVED EJECTION FRACTION (HCC): ICD-10-CM

## 2024-12-10 DIAGNOSIS — N18.30 BENIGN HYPERTENSION WITH CHRONIC KIDNEY DISEASE, STAGE III (HCC): ICD-10-CM

## 2024-12-10 DIAGNOSIS — E83.9 CHRONIC KIDNEY DISEASE-MINERAL BONE DISORDER (CKD-MBD) WITH STAGE 3B CHRONIC KIDNEY DISEASE (HCC): ICD-10-CM

## 2024-12-10 DIAGNOSIS — N18.32 CKD STAGE G3B/A1, GFR 30-44 AND ALBUMIN CREATININE RATIO <30 MG/G (HCC): Primary | ICD-10-CM

## 2024-12-10 PROBLEM — M54.2 NECK PAIN: Status: RESOLVED | Noted: 2022-08-20 | Resolved: 2024-12-10

## 2024-12-10 PROBLEM — R07.89 OTHER CHEST PAIN: Status: RESOLVED | Noted: 2017-12-08 | Resolved: 2024-12-10

## 2024-12-10 PROBLEM — N17.9 AKI (ACUTE KIDNEY INJURY) (HCC): Status: RESOLVED | Noted: 2022-08-19 | Resolved: 2024-12-10

## 2024-12-10 PROBLEM — R60.0 LEG EDEMA: Status: RESOLVED | Noted: 2024-09-10 | Resolved: 2024-12-10

## 2024-12-10 PROCEDURE — 99214 OFFICE O/P EST MOD 30 MIN: CPT | Performed by: STUDENT IN AN ORGANIZED HEALTH CARE EDUCATION/TRAINING PROGRAM

## 2024-12-10 NOTE — ASSESSMENT & PLAN NOTE
Wt Readings from Last 3 Encounters:   11/11/24 66.7 kg (147 lb)   11/04/24 65.7 kg (144 lb 14.4 oz)   10/31/24 66.7 kg (147 lb)     Euvolemic on exam  Continue with SGLT2 inhibitors    Follow-up with cardiology

## 2024-12-10 NOTE — ASSESSMENT & PLAN NOTE
Lab Results   Component Value Date    EGFR 38 12/02/2024    EGFR 37 06/15/2024    EGFR 26 04/23/2024    CREATININE 1.28 12/02/2024    CREATININE 1.31 (H) 06/15/2024    CREATININE 1.78 (H) 04/23/2024     Calcium 9.4 mg/dL  Phosphorus 3.4 mg/dL, goal less than 5.5  PTH at goal

## 2024-12-10 NOTE — ASSESSMENT & PLAN NOTE
Lab Results   Component Value Date    EGFR 38 12/02/2024    EGFR 37 06/15/2024    EGFR 26 04/23/2024    CREATININE 1.28 12/02/2024    CREATININE 1.31 (H) 06/15/2024    CREATININE 1.78 (H) 04/23/2024   Blood pressure 108/62  Recommend goal less than 140/90   Continue with low-sodium diet   Continue with blood pressure regimen as follow  Continue with Aldactone  Continue with losartan 50 mg twice daily  Nebivolol 500 mg daily  Advised to maintain a good BP control to prevent progression of CKD

## 2024-12-10 NOTE — ASSESSMENT & PLAN NOTE
Lab Results   Component Value Date    EGFR 38 12/02/2024    EGFR 37 06/15/2024    EGFR 26 04/23/2024    CREATININE 1.28 12/02/2024    CREATININE 1.31 (H) 06/15/2024    CREATININE 1.78 (H) 04/23/2024     Baseline creatinine: 1.2 to 1.3 mg/dL  Current creatinine: 1.2 mg/dL, at baseline  Etiology: Likely secondary to nephrosclerosis in the settings of cardiovascular disease  UA with microhematuria, leukocyturia  Plan:  No indication of dialysis at this time  Avoid NSAIDs   Will monitor kidney function closely with BMP, UACR

## 2024-12-10 NOTE — PATIENT INSTRUCTIONS
Thank you for coming to your visit today. As we discussed you kidney function is stable at your baseline, your creatinine is 1.2 mg/dL.  Your electrolytes are normal. Please follow the recommendations below       Recommend low sodium (salt) food    Avoid nonsteroidal anti-inflammatory drugs such as Naprosyn, ibuprofen, Aleve, Advil, Celebrex, Meloxicam (Mobic) etc.  You can use Tylenol as needed if you do not have any liver condition to be concerned about    Try to avoid medications such as pantoprazole or  Protonix/Nexium or Esomeprazole)/Prilosec or omeprazole/Prevacid or lansoprazole/AcipHex or Rabeprazole.  If you are able to, use Pepcid as this is safer for your kidneys.    Try to exercise at least 30 minutes 3 days a week to begin with with an ultimate goal of 5 days a week for at least 30 minutes    Next Visit in 8 months with results   If you need to see us earlier we can change the appointment for you      Joselyn Reyes Bahamonde, MD  Nephrology Attending

## 2024-12-10 NOTE — PROGRESS NOTES
Name: Ade Moon      : 1940      MRN: [id number]  Encounter Provider: Joselyn Reyes Bahamonde, MD  Encounter Date: 12/10/2024   Encounter department: [facility]  :  Assessment & Plan  Benign hypertension with chronic kidney disease, stage III (HCC)  Lab Results   Component Value Date    EGFR 38 2024    EGFR 37 06/15/2024    EGFR 26 2024    CREATININE 1.28 2024    CREATININE 1.31 (H) 06/15/2024    CREATININE 1.78 (H) 2024   Blood pressure 108/62  Recommend goal less than 140/90   Continue with low-sodium diet   Continue with blood pressure regimen as follow  Continue with Aldactone  Continue with losartan 50 mg twice daily  Nebivolol 500 mg daily  Advised to maintain a good BP control to prevent progression of CKD      Chronic heart failure with preserved ejection fraction (HCC)  Wt Readings from Last 3 Encounters:   24 66.7 kg (147 lb)   24 65.7 kg (144 lb 14.4 oz)   10/31/24 66.7 kg (147 lb)     Euvolemic on exam  Continue with SGLT2 inhibitors    Follow-up with cardiology        CKD stage G3b/A1, GFR 30-44 and albumin creatinine ratio <30 mg/g ([facility])  Lab Results   Component Value Date    EGFR 38 2024    EGFR 37 06/15/2024    EGFR 26 2024    CREATININE 1.28 2024    CREATININE 1.31 (H) 06/15/2024    CREATININE 1.78 (H) 2024     Baseline creatinine: 1.2 to 1.3 mg/dL  Current creatinine: 1.2 mg/dL, at baseline  Etiology: Likely secondary to nephrosclerosis in the settings of cardiovascular disease  UA with microhematuria, leukocyturia  Plan:  No indication of dialysis at this time  Avoid NSAIDs   Will monitor kidney function closely with BMP, UACR      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 female PMH of CKD G3 a with baseline creatinine 1.1 to 1.2 mg/dL, HFpEF s/p pacemaker, hypertension, CVA, carotid artery stenosis who presents elevated creatinine   History obtained from: patient    Review of Systems   Constitutional:  Negative for activity change and appetite change.   HENT:  Negative for congestion and dental problem.    Eyes:  Negative for discharge.   Respiratory:  Negative for shortness of breath.    Cardiovascular:  Negative for chest pain and leg swelling.   Gastrointestinal:  Negative for abdominal distention.   Endocrine: Negative for cold intolerance.   Genitourinary:  Negative for dysuria.   Musculoskeletal:  Negative for arthralgias and back pain.   Skin:  Negative for color change and pallor.   Neurological:  Negative for dizziness.   Psychiatric/Behavioral:  Negative for agitation.          Medical History Reviewed by provider this encounter:     .  Current Outpatient Medications on File Prior to Visit   Medication Sig Dispense Refill    acetaminophen (TYLENOL) 325 mg tablet Take 2 tablets (650 mg total) by mouth every 6 (six) hours as needed for mild pain  0    ammonium lactate (LAC-HYDRIN) 12 % cream APPLY TOPICALLY AS NEEDED FOR DRY SKIN 385 g 3    Blood Pressure Monitoring (Blood Pressure Cuff) MISC Use every other day For HTN (Patient not taking: Reported on 8/15/2023) 1 each 0    cyanocobalamin 1,000 mcg/mL INJECT 1 ML (1,000 MCG TOTAL) INTO A MUSCLE EVERY 30 (THIRTY) DAYS 3 mL 3    gabapentin (NEURONTIN) 100 mg capsule TAKE 1 CAPSULE (100 MG TOTAL) BY MOUTH 3 (THREE) TIMES A DAY AS NEEDED (AS NEEDED FOR LEFT FOOT PAIN) 90 capsule 1    Incontinence Supply Disposable (RA Adult Wipes) MISC Use 4 (four) times a day 64 each 10    Jardiance 10 MG TABS tablet TAKE 1 TABLET (10 MG TOTAL) BY MOUTH EVERY  MORNING 90 tablet 5    losartan (COZAAR) 50 mg tablet Take 1 tablet (50 mg total) by mouth 2 (two) times a day 180 tablet 5    nebivolol (BYSTOLIC) 5 mg tablet TAKE 1 TABLET (5 MG TOTAL) BY MOUTH DAILY 90 tablet 3    nitroglycerin (NITROSTAT) 0.4 mg SL tablet Place 1 tablet (0.4 mg total) under the tongue every 5 (five) minutes as needed for chest pain (Patient not taking: Reported on 8/3/2023)  0    pantoprazole (PROTONIX) 40 mg tablet TAKE 1 TABLET (40 MG TOTAL) BY MOUTH DAILY IN THE EARLY MORNING 90 tablet 3    Pradaxa 150 MG capsu SB JUAN ANTONIO CAPSULA POR VIA ORAL DOS VECES AL KATRINA 180 capsule 3    rosuvastatin (CRESTOR) 5 mg tablet TAKE 1 TABLET (5 MG TOTAL) BY MOUTH DAILY 90 tablet 3    spironolactone (ALDACTONE) 25 mg tablet TAKE 1 TABLET (25 MG TOTAL) BY MOUTH DAILY 90 tablet 1    verapamil (CALAN-SR) 240 mg CR tablet TAKE 1 TABLET (240 MG TOTAL) BY MOUTH DAILY AT BEDTIME 90 tablet 3    [DISCONTINUED] apixaban (Eliquis) 5 mg Take 1 tablet (5 mg total) by mouth 2 (two) times a day 60 tablet 0     Current Facility-Administered Medications on File Prior to Visit   Medication Dose Route Frequency Provider Last Rate Last Admin    cyanocobalamin injection 1,000 mcg  1,000 mcg Intramuscular Q30 Days Deepak Hermosillo MD   1,000 mcg at 11/18/24 0938      Social History     Tobacco Use    Smoking status: Never    Smokeless tobacco: Never   Vaping Use    Vaping status: Never Used   Substance and Sexual Activity    Alcohol use: Never    Drug use: No    Sexual activity: Not Currently     Comment:         Objective   There were no vitals taken for this visit.     Physical Exam  General:  no acute distress at this time  Skin:  No acute rash  Eyes:  No scleral icterus and noninjected  ENT:  mucous membranes moist  Neck:  no carotid bruits  Chest:  Clear to auscultation percussion, good respiratory effort, no use of accessory respiratory muscles  CVS:  Regular rate and rhythm without rub   Abdomen:  soft and nontender    Extremities: no significant lower extremity edema  Neuro:  No gross focality  Psych:  Alert , cooperative

## 2024-12-16 ENCOUNTER — PATIENT OUTREACH (OUTPATIENT)
Dept: INTERNAL MEDICINE CLINIC | Facility: CLINIC | Age: 84
End: 2024-12-16

## 2024-12-16 NOTE — PROGRESS NOTES
SW has reached out to pt's son to f/u with him re  possible other community resources but had to leave a message.   SW to f/u and assist as indicated.

## 2024-12-19 ENCOUNTER — TELEPHONE (OUTPATIENT)
Dept: SLEEP CENTER | Facility: CLINIC | Age: 84
End: 2024-12-19

## 2024-12-19 DIAGNOSIS — Z86.73 HISTORY OF CVA (CEREBROVASCULAR ACCIDENT): ICD-10-CM

## 2024-12-19 DIAGNOSIS — M79.672 LEFT FOOT PAIN: ICD-10-CM

## 2024-12-19 RX ORDER — GABAPENTIN 100 MG/1
100 CAPSULE ORAL 3 TIMES DAILY PRN
Qty: 90 CAPSULE | Refills: 3 | Status: SHIPPED | OUTPATIENT
Start: 2024-12-19

## 2024-12-19 NOTE — TELEPHONE ENCOUNTER
Received call from patient who states her machine started making a loud noise last night.  It scared her and her family and she's not sure what to do.  Also states the air pressure is too strong. She was unable to say how long it's been this way.    Provided patient with phone number to [employer] to contact their troubleshooting department to assess the noise.   Advised patient that I will obtain compliance data from [employer].  Maribell will want to review this data before making any changes to the machine.  The nursing staff will be in touch once Maribell replies with recommendations.  Patient verbalized understanding.      Compliance data requested from [employer].

## 2024-12-20 ENCOUNTER — PATIENT OUTREACH (OUTPATIENT)
Dept: INTERNAL MEDICINE CLINIC | Facility: CLINIC | Age: 84
End: 2024-12-20

## 2024-12-20 NOTE — PROGRESS NOTES
SW spoke briefly with pt's son.  Family reviewing resources with pt.     Patient /Family have my contact # and PCP office # if needed.  Social Work to remain available to assist as indicated.    Please re-consult Social Work if needed.

## 2024-12-22 DIAGNOSIS — Z78.9 DIFFICULTY USING BIPHASIC POSITIVE AIRWAY PRESSURE (BIPAP) MACHINE: Primary | ICD-10-CM

## 2024-12-22 NOTE — PROGRESS NOTES
Office note and compliance report reviewed.  Patient to contact [facility] to trouble shoot noise.  A pressure change was ordered, due to report of pressure feeling too strong.  Patient to be advised to be sure she is not having air leaks from the mask because it can cause the pressure to increase too high for comfort.  If she is having air leaks, call for a mask fitting and /or be sure she is tightening the mask.  She needs to increase use and if having any difficulty, call to report what she is experiencing so we can help her.

## 2024-12-24 NOTE — TELEPHONE ENCOUNTER
TOMY Cohn at 12/22/2024 11:37 AM    Status: Signed   Office note and compliance report reviewed.  Patient to contact [facility] to trouble shoot noise.  A pressure change was ordered, due to report of pressure feeling too strong.  Patient to be advised to be sure she is not having air leaks from the mask because it can cause the pressure to increase too high for comfort.  If she is having air leaks, call for a mask fitting and /or be sure she is tightening the mask.  She needs to increase use and if having any difficulty, call to report what she is experiencing so we can help her.      _________________________________________    RX for PAP pressure change order sent to [facility] via [place]    Call to patient using  # [phone]. Voicemail message left and MyChart message sent to patient.

## 2024-12-31 ENCOUNTER — APPOINTMENT (EMERGENCY)
Dept: RADIOLOGY | Facility: HOSPITAL | Age: 84
End: 2024-12-31
Payer: MEDICARE

## 2024-12-31 ENCOUNTER — HOSPITAL ENCOUNTER (EMERGENCY)
Facility: HOSPITAL | Age: 84
Discharge: HOME | End: 2024-12-31
Attending: EMERGENCY MEDICINE
Payer: MEDICARE

## 2024-12-31 VITALS
DIASTOLIC BLOOD PRESSURE: 98 MMHG | OXYGEN SATURATION: 97 % | SYSTOLIC BLOOD PRESSURE: 210 MMHG | HEART RATE: 66 BPM | TEMPERATURE: 98 F | RESPIRATION RATE: 18 BRPM

## 2024-12-31 DIAGNOSIS — R07.9 CHEST PAIN: Primary | ICD-10-CM

## 2024-12-31 LAB
2HR DELTA HS TROPONIN: 1 NG/L
ALBUMIN SERPL BCG-MCNC: 3.9 G/DL (ref 3.5–5)
ALP SERPL-CCNC: 67 U/L (ref 34–104)
ALT SERPL W P-5'-P-CCNC: 7 U/L (ref 7–52)
ANION GAP SERPL CALCULATED.3IONS-SCNC: 10 MMOL/L (ref 4–13)
AST SERPL W P-5'-P-CCNC: 17 U/L (ref 13–39)
BASOPHILS # BLD AUTO: 0.03 THOUSANDS/ΜL (ref 0–0.1)
BASOPHILS NFR BLD AUTO: 0 % (ref 0–1)
BILIRUB SERPL-MCNC: 0.83 MG/DL (ref 0.2–1)
BUN SERPL-MCNC: 24 MG/DL (ref 5–25)
CALCIUM SERPL-MCNC: 9.3 MG/DL (ref 8.4–10.2)
CARDIAC TROPONIN I PNL SERPL HS: 6 NG/L (ref ?–50)
CARDIAC TROPONIN I PNL SERPL HS: 7 NG/L (ref ?–50)
CHLORIDE SERPL-SCNC: 107 MMOL/L (ref 96–108)
CO2 SERPL-SCNC: 21 MMOL/L (ref 21–32)
CREAT SERPL-MCNC: 1.07 MG/DL (ref 0.6–1.3)
EOSINOPHIL # BLD AUTO: 0.03 THOUSAND/ΜL (ref 0–0.61)
EOSINOPHIL NFR BLD AUTO: 0 % (ref 0–6)
ERYTHROCYTE [DISTWIDTH] IN BLOOD BY AUTOMATED COUNT: 13.6 % (ref 11.6–15.1)
GFR SERPL CREATININE-BSD FRML MDRD: 47 ML/MIN/1.73SQ M
GLUCOSE SERPL-MCNC: 135 MG/DL (ref 65–140)
HCT VFR BLD AUTO: 41.3 % (ref 34.8–46.1)
HGB BLD-MCNC: 13.2 G/DL (ref 11.5–15.4)
IMM GRANULOCYTES # BLD AUTO: 0.03 THOUSAND/UL (ref 0–0.2)
IMM GRANULOCYTES NFR BLD AUTO: 0 % (ref 0–2)
LYMPHOCYTES # BLD AUTO: 1 THOUSANDS/ΜL (ref 0.6–4.47)
LYMPHOCYTES NFR BLD AUTO: 12 % (ref 14–44)
MCH RBC QN AUTO: 31 PG (ref 26.8–34.3)
MCHC RBC AUTO-ENTMCNC: 32 G/DL (ref 31.4–37.4)
MCV RBC AUTO: 97 FL (ref 82–98)
MONOCYTES # BLD AUTO: 0.53 THOUSAND/ΜL (ref 0.17–1.22)
MONOCYTES NFR BLD AUTO: 6 % (ref 4–12)
NEUTROPHILS # BLD AUTO: 6.69 THOUSANDS/ΜL (ref 1.85–7.62)
NEUTS SEG NFR BLD AUTO: 82 % (ref 43–75)
NRBC BLD AUTO-RTO: 0 /100 WBCS
PLATELET # BLD AUTO: 194 THOUSANDS/UL (ref 149–390)
PMV BLD AUTO: 10.4 FL (ref 8.9–12.7)
POTASSIUM SERPL-SCNC: 4 MMOL/L (ref 3.5–5.3)
PROT SERPL-MCNC: 7.7 G/DL (ref 6.4–8.4)
RBC # BLD AUTO: 4.26 MILLION/UL (ref 3.81–5.12)
SODIUM SERPL-SCNC: 138 MMOL/L (ref 135–147)
WBC # BLD AUTO: 8.31 THOUSAND/UL (ref 4.31–10.16)

## 2024-12-31 PROCEDURE — 99285 EMERGENCY DEPT VISIT HI MDM: CPT | Performed by: EMERGENCY MEDICINE

## 2024-12-31 PROCEDURE — 84484 ASSAY OF TROPONIN QUANT: CPT | Performed by: EMERGENCY MEDICINE

## 2024-12-31 PROCEDURE — 71045 X-RAY EXAM CHEST 1 VIEW: CPT

## 2024-12-31 PROCEDURE — 36415 COLL VENOUS BLD VENIPUNCTURE: CPT

## 2024-12-31 PROCEDURE — 85025 COMPLETE CBC W/AUTO DIFF WBC: CPT | Performed by: EMERGENCY MEDICINE

## 2024-12-31 PROCEDURE — 93005 ELECTROCARDIOGRAM TRACING: CPT

## 2024-12-31 PROCEDURE — 99285 EMERGENCY DEPT VISIT HI MDM: CPT

## 2024-12-31 PROCEDURE — 80053 COMPREHEN METABOLIC PANEL: CPT | Performed by: EMERGENCY MEDICINE

## 2024-12-31 NOTE — ED NOTES
Provided patient with ice water as requested, awaiting repeat troponin and disposition      Margot Richard RN  12/31/24 [id number]

## 2024-12-31 NOTE — DISCHARGE INSTRUCTIONS
You were seen in the Emergency Department today for chest pain.  A referral to cardiology was provided, please follow up with them as soon as able for re-evaluation and further management.  Please follow up with your primary care doctor in a week for re-evaluation.  Please return to the Emergency Department if you experience worsening of your current symptoms or any other concerning symptoms.

## 2024-12-31 NOTE — ED PROVIDER NOTES
ED Disposition       None          Assessment & Plan   {Hyperlinks  Risk Stratification - NIHSS - HEART SCORE - Fill out sepsis note and make sure you call [phone] if severe or septic shock:[id number]}    Medical Decision Making  Amount and/or Complexity of Data Reviewed  Labs: ordered.  Radiology: ordered.        ED Course as of 12/31/24 1717   Tue Dec 31, 2024   1708 Delta 2hr hsTnI: 1       Medications - No data to display    ED Risk Strat Scores                                              History of Present Illness   {Hyperlinks  History (Med, Surg, Fam, Social) - Current Medications - Allergies  :[id number]}    Chief Complaint   Patient presents with    Chest Pain     Sharp CP that started at 1130 and also c/o SOB. Was given ASA and nitro via EMS which relieved the pain. Pt has pacemaker for AFIB.       Past Medical History:   Diagnosis Date    A-fib (HCC)     Anemia     Arthritis     Breast pain, right     Chest pain on breathing     Colon polyp     Cough     Diverticulosis     Encounter for screening colonoscopy     H/O sick sinus syndrome     Heart murmur     High cholesterol     History of atrial fibrillation     Rate ontrolled. On xarelto 15mg (creatinine 50) Followed by Dr. Randolph with Cardiology     History of weight loss     Report loose fitting clothes. Weight stable (3lbs difference). Last colo showed small tubular adenoma x2. Breast biopsy last showed fibrocystic changes. TSH wnl. Will check cbc, bmp, spep.        Hx of long term use of blood thinners     Hypertension     Irregular heart beat     RICH (obstructive sleep apnea)     Pacemaker     Preop examination     Shortness of breath     Viral bronchitis       Past Surgical History:   Procedure Laterality Date    BREAST BIOPSY Right 08/17/2006    ultrasound guided Percutaneous Needle Core -Benign    CATARACT EXTRACTION      CATARACT EXTRACTION W/ INTRAOCULAR LENS  IMPLANT, BILATERAL      COLONOSCOPY      COLONOSCOPY N/A 05/22/2018    Procedure:  COLONOSCOPY;  Surgeon: Jenn Jang DO;  Location: [facility];  Service: Gastroenterology    INSERT / REPLACE / REMOVE PACEMAKER      LEG SURGERY Right     as a child after a fall    MAMMO STEREOTACTIC BREAST BIOPSY RIGHT (ALL INC) Right 10/2014    benign    CT CMBND ANTERPOST COLPORRAPHY W/CYSTO N/A 03/17/2016    Procedure: COLPORRHAPHY ANTERIOR POSTERIOR ;  Surgeon: Dario Braden MD;  Location: [facility];  Service: Gynecology    CT COLONOSCOPY FLX DX W/COLLJ SPEC WHEN PFRMD N/A 04/04/2019    Procedure: COLONOSCOPY;  Surgeon: Jenn Jang DO;  Location: [facility];  Service: Gastroenterology    CT COLPOPEXY VAGINAL EXTRAPERITONEAL APPROACH N/A 03/17/2016    Procedure: COLPOPEXY VAGINAL EXTRAPERITONEAL (VEC) ANTERIOR ;  Surgeon: Dario Braden MD;  [facility];  Service: Gynecology    CT CYSTOURETHROSCOPY N/A 03/17/2016    Procedure: CYSTOSCOPY;  Surgeon: Dario Braden MD;  [facility];  Service: Gynecology    CT ESOPHAGOGASTRODUODENOSCOPY TRANSORAL DIAGNOSTIC N/A 04/16/2018    Procedure: EGD AND COLONOSCOPY;  Surgeon: Jenn Jang DO;  [facility]: [facility];  Service: Gastroenterology    CT LAPAROSCOPY COLECTOMY PARTIAL W/ANASTOMOSIS Right 01/13/2022    Procedure: Diagnostic laparoscopy, laparscopic right colectomy;  Surgeon: Andriy Guevara MD;  Location: [facility];  Service: Colorectal    CT SLING OPERATION STRESS INCONTINENCE N/A 03/17/2016    Procedure: INSERTION PUBOVAGINAL SLING SINGLE INCISION ;  Surgeon: Dario Braden MD;  [facility];  Service: Gynecology      Family History   Problem Relation Age of Onset    Diabetes Mother     Breast cancer Sister 53    No Known Problems Father     No Known Problems Maternal Grandmother     No Known Problems Maternal Grandfather     No Known Problems Paternal Grandmother     No Known Problems Paternal Grandfather     No Known Problems Daughter     No Known Problems Son     No Known Problems  Sister     No Known Problems Sister     No Known Problems Brother     No Known Problems Brother     No Known Problems Sister     No Known Problems Paternal Aunt     No Known Problems Paternal Aunt     No Known Problems Paternal Aunt     No Known Problems Paternal Aunt       Social History     Tobacco Use    Smoking status: Never    Smokeless tobacco: Never   Vaping Use    Vaping status: Never Used   Substance Use Topics    Alcohol use: Never    Drug use: No      E-Cigarette/Vaping    E-Cigarette Use Never User       E-Cigarette/Vaping Substances    Nicotine No     THC No     CBD No     Flavoring No     Other No     Unknown No       I have reviewed and agree with the history as documented.     HPI  Patient is a 84 y.o. female with PMHx CKD, CVA with residual left sided deficit, atrial fibrillation on Pradaxa, HFpEF s/p pacemaker placement, HTN, HLD who presents to the ED via EMS for evaluation of chest pain.  Patient reports developing sharp chest pain while watching TV around 1130 today.  Patient called EMS and was given aspirin and nitroglycerin with resolution of the pain.  Patient offers no complaints at this time.  Patient denies any recent illness.  Patient denies any fever, chills, cough, congestion, abdominal pain, vomiting, diarrhea.  Denies history of PE or DVT.     Review of Systems   Cardiovascular:  Positive for chest pain.           Objective   {Hyperlinks  Historical Vitals - Historical Labs - Chart Review/Microbiology - Last Echo - Code Status  :[id number]}    ED Triage Vitals   Temperature Pulse Blood Pressure Respirations SpO2 Patient Position - Orthostatic VS   12/31/24 1348 12/31/24 1349 12/31/24 1349 12/31/24 1349 12/31/24 1349 --   98 °F (36.7 °C) 80 158/68 18 96 %       Temp src Heart Rate Source BP Location FiO2 (%) Pain Score    -- -- -- -- 12/31/24 1348        10 - Worst Possible Pain      Vitals      Date and Time Temp Pulse SpO2 Resp BP Pain Score FACES Pain Rating User   12/31/24 1349 --  80 96 % 18 158/68 -- -- EM   12/31/24 1348 98 °F (36.7 °C) -- -- -- -- 10 - Worst Possible Pain -- EM            Physical Exam  Vitals and nursing note reviewed.   Constitutional:       General: She is not in acute distress.     Appearance: Normal appearance. She is well-developed. She is not ill-appearing, toxic-appearing or diaphoretic.   HENT:      Head: Normocephalic and atraumatic.   Eyes:      Conjunctiva/sclera: Conjunctivae normal.   Cardiovascular:      Rate and Rhythm: Normal rate and regular rhythm.      Heart sounds: No murmur heard.  Pulmonary:      Effort: Pulmonary effort is normal. No respiratory distress.      Breath sounds: Normal breath sounds. No stridor. No wheezing, rhonchi or rales.   Chest:      Chest wall: No tenderness.   Abdominal:      Palpations: Abdomen is soft.      Tenderness: There is no abdominal tenderness.   Musculoskeletal:         General: No swelling. Normal range of motion.      Cervical back: Normal range of motion and neck supple. No rigidity.      Comments: Trace edema in LLE, which pt states is normal for her d/t history of CVA with left sided weakness  No calf tenderness     Skin:     General: Skin is warm and dry.      Capillary Refill: Capillary refill takes less than 2 seconds.   Neurological:      General: No focal deficit present.      Mental Status: She is alert and oriented to person, place, and time.   Psychiatric:         Mood and Affect: Mood normal.         Results Reviewed       Procedure Component Value Units Date/Time    HS Troponin 0hr (reflex protocol) [[id number]]  (Normal) Collected: 12/31/24 1355    Lab Status: Final result Specimen: Blood from Arm, Left Updated: 12/31/24 1429     hs TnI 0hr 6 ng/L     HS Troponin I 2hr [[id number]]     Lab Status: No result Specimen: Blood     Comprehensive metabolic panel [[id number]] Collected: 12/31/24 1355    Lab Status: Final result Specimen: Blood from Arm, Left Updated: 12/31/24 1425     Sodium 138 mmol/L       Potassium 4.0 mmol/L      Chloride 107 mmol/L      CO2 21 mmol/L      ANION GAP 10 mmol/L      BUN 24 mg/dL      Creatinine 1.07 mg/dL      Glucose 135 mg/dL      Calcium 9.3 mg/dL      AST 17 U/L      ALT 7 U/L      Alkaline Phosphatase 67 U/L      Total Protein 7.7 g/dL      Albumin 3.9 g/dL      Total Bilirubin 0.83 mg/dL      eGFR 47 ml/min/1.73sq m     Narrative:      National Kidney Disease Foundation guidelines for Chronic Kidney Disease (CKD):     Stage 1 with normal or high GFR (GFR > 90 mL/min/1.73 square meters)    Stage 2 Mild CKD (GFR = 60-89 mL/min/1.73 square meters)    Stage 3A Moderate CKD (GFR = 45-59 mL/min/1.73 square meters)    Stage 3B Moderate CKD (GFR = 30-44 mL/min/1.73 square meters)    Stage 4 Severe CKD (GFR = 15-29 mL/min/1.73 square meters)    Stage 5 End Stage CKD (GFR <15 mL/min/1.73 square meters)  Note: GFR calculation is accurate only with a steady state creatinine    CBC and differential [[id number]]  (Abnormal) Collected: 12/31/24 1355    Lab Status: Final result Specimen: Blood from Arm, Left Updated: 12/31/24 1408     WBC 8.31 Thousand/uL      RBC 4.26 Million/uL      Hemoglobin 13.2 g/dL      Hematocrit 41.3 %      MCV 97 fL      MCH 31.0 pg      MCHC 32.0 g/dL      RDW 13.6 %      MPV 10.4 fL      Platelets 194 Thousands/uL      nRBC 0 /100 WBCs      Segmented % 82 %      Immature Grans % 0 %      Lymphocytes % 12 %      Monocytes % 6 %      Eosinophils Relative 0 %      Basophils Relative 0 %      Absolute Neutrophils 6.69 Thousands/µL      Absolute Immature Grans 0.03 Thousand/uL      Absolute Lymphocytes 1.00 Thousands/µL      Absolute Monocytes 0.53 Thousand/µL      Eosinophils Absolute 0.03 Thousand/µL      Basophils Absolute 0.03 Thousands/µL             XR chest 1 view portable    (Results Pending)       Procedures    ED Medication and Procedure Management   Prior to Admission Medications   Prescriptions Last Dose Informant Patient Reported? Taking?   Blood Pressure  Monitoring (Blood Pressure Cuff) MISC  Self No No   Sig: Use every other day For HTN   Patient not taking: Reported on 8/15/2023   Incontinence Supply Disposable (RA Adult Wipes) MISC  Self No No   Sig: Use 4 (four) times a day   Jardiance 10 MG TABS tablet  Self No No   Sig: TAKE 1 TABLET (10 MG TOTAL) BY MOUTH EVERY MORNING   Pradaxa 150 MG capsu   No No   Sig: SB JUAN ANTONIO CAPSULA POR VIA ORAL DOS VECES AL KATRINA   acetaminophen (TYLENOL) 325 mg tablet  Self No No   Sig: Take 2 tablets (650 mg total) by mouth every 6 (six) hours as needed for mild pain   ammonium lactate (LAC-HYDRIN) 12 % cream  Self No No   Sig: APPLY TOPICALLY AS NEEDED FOR DRY SKIN   cyanocobalamin 1,000 mcg/mL  Self No No   Sig: INJECT 1 ML (1,000 MCG TOTAL) INTO A MUSCLE EVERY 30 (THIRTY) DAYS   gabapentin (NEURONTIN) 100 mg capsule   No No   Sig: TAKE 1 CAPSULE (100 MG TOTAL) BY MOUTH 3 (THREE) TIMES A DAY AS NEEDED (AS NEEDED FOR LEFT FOOT PAIN)   losartan (COZAAR) 50 mg tablet  Self No No   Sig: Take 1 tablet (50 mg total) by mouth 2 (two) times a day   nebivolol (BYSTOLIC) 5 mg tablet  Self No No   Sig: TAKE 1 TABLET (5 MG TOTAL) BY MOUTH DAILY   nitroglycerin (NITROSTAT) 0.4 mg SL tablet  Self No No   Sig: Place 1 tablet (0.4 mg total) under the tongue every 5 (five) minutes as needed for chest pain   pantoprazole (PROTONIX) 40 mg tablet   No No   Sig: TAKE 1 TABLET (40 MG TOTAL) BY MOUTH DAILY IN THE EARLY MORNING   rosuvastatin (CRESTOR) 5 mg tablet   No No   Sig: TAKE 1 TABLET (5 MG TOTAL) BY MOUTH DAILY   spironolactone (ALDACTONE) 25 mg tablet   No No   Sig: TAKE 1 TABLET (25 MG TOTAL) BY MOUTH DAILY   verapamil (CALAN-SR) 240 mg CR tablet  Self No No   Sig: TAKE 1 TABLET (240 MG TOTAL) BY MOUTH DAILY AT BEDTIME      Facility-Administered Medications Last Administration Doses Remaining   cyanocobalamin injection 1,000 mcg 11/18/2024  9:38 AM         Patient's Medications   Discharge Prescriptions    No medications on file     No  discharge procedures on file.  ED SEPSIS DOCUMENTATION          HTN, Starting Wed 4/28/2021, Normal      cyanocobalamin 1,000 mcg/mL INJECT 1 ML (1,000 MCG TOTAL) INTO A MUSCLE EVERY 30 (THIRTY) DAYS, Starting Fri 11/1/2024, Until Mon 10/27/2025, Normal      gabapentin (NEURONTIN) 100 mg capsule TAKE 1 CAPSULE (100 MG TOTAL) BY MOUTH 3 (THREE) TIMES A DAY AS NEEDED (AS NEEDED FOR LEFT FOOT PAIN), Starting Thu 12/19/2024, Normal      Incontinence Supply Disposable (RA Adult Wipes) MISC Use 4 (four) times a day, Starting Fri 10/7/2022, Print      Jardiance 10 MG TABS tablet TAKE 1 TABLET (10 MG TOTAL) BY MOUTH EVERY MORNING, Starting Mon 9/30/2024, Normal      losartan (COZAAR) 50 mg tablet Take 1 tablet (50 mg total) by mouth 2 (two) times a day, Starting Wed 3/13/2024, Until Thu 9/4/2025, Normal      nebivolol (BYSTOLIC) 5 mg tablet TAKE 1 TABLET (5 MG TOTAL) BY MOUTH DAILY, Starting Mon 8/5/2024, Normal      nitroglycerin (NITROSTAT) 0.4 mg SL tablet Place 1 tablet (0.4 mg total) under the tongue every 5 (five) minutes as needed for chest pain, Starting Tue 9/6/2022, No Print      pantoprazole (PROTONIX) 40 mg tablet TAKE 1 TABLET (40 MG TOTAL) BY MOUTH DAILY IN THE EARLY MORNING, Starting Thu 11/14/2024, Normal      Pradaxa 150 MG capsu SB JUAN ANTONIO CAPSULA POR VIA ORAL DOS VECES AL KATRINA, Normal      spironolactone (ALDACTONE) 25 mg tablet TAKE 1 TABLET (25 MG TOTAL) BY MOUTH DAILY, Starting Thu 11/7/2024, Until Fri 5/1/2026, Normal      verapamil (CALAN-SR) 240 mg CR tablet TAKE 1 TABLET (240 MG TOTAL) BY MOUTH DAILY AT BEDTIME, Starting Mon 8/26/2024, Normal      rosuvastatin (CRESTOR) 5 mg tablet TAKE 1 TABLET (5 MG TOTAL) BY MOUTH DAILY, Starting Fri 11/22/2024, Normal             ED SEPSIS DOCUMENTATION   Time reflects when diagnosis was documented in both MDM as applicable and the Disposition within this note       Time User Action Codes Description Comment    12/31/2024  5:19 PM Sheeba Arias Add [R07.9] Chest pain                  Sheeba Arias MD  01/11/25 2048

## 2024-12-31 NOTE — ED ATTENDING ATTESTATION
12/31/2024  I, Terrance Gleason MD, saw and evaluated the patient. I have discussed the patient with the resident/non-physician practitioner and agree with the resident's/non-physician practitioner's findings, Plan of Care, and MDM as documented in the resident's/non-physician practitioner's note, except where noted. All available labs and Radiology studies were reviewed.  I was present for key portions of any procedure(s) performed by the resident/non-physician practitioner and I was immediately available to provide assistance.       At this point I agree with the current assessment done in the Emergency Department.  I have conducted an independent evaluation of this patient a history and physical is as follows:  Here with cp   resolved with ntg and asa by ems  H/o afib   H/o stroke   With left residual weakness  No fever no cough   Exam patient is in no acute distress neck no JVD lungs clear heart regular abdomen soft nontender extremities trace edema  Neuro mild left-sided weakness  Patient is pain-free at the present time her EKG shows a paced rhythm with a bundle branch block pattern  Initial troponin is 6  Remains symptom-free     ED Course         Critical Care Time  Procedures

## 2025-01-01 LAB
ATRIAL RATE: 28 BPM
QRS AXIS: 240 DEGREES
QRSD INTERVAL: 148 MS
QT INTERVAL: 318 MS
QTC INTERVAL: 473 MS
T WAVE AXIS: 48 DEGREES
VENTRICULAR RATE: 133 BPM

## 2025-01-01 PROCEDURE — 93010 ELECTROCARDIOGRAM REPORT: CPT | Performed by: INTERNAL MEDICINE

## 2025-01-07 ENCOUNTER — OFFICE VISIT (OUTPATIENT)
Dept: OBGYN CLINIC | Facility: CLINIC | Age: 85
End: 2025-01-07

## 2025-01-07 VITALS
BODY MASS INDEX: 25.34 KG/M2 | WEIGHT: 143 LBS | DIASTOLIC BLOOD PRESSURE: 66 MMHG | SYSTOLIC BLOOD PRESSURE: 137 MMHG | HEIGHT: 63 IN

## 2025-01-07 DIAGNOSIS — N95.0 PMB (POSTMENOPAUSAL BLEEDING): Primary | ICD-10-CM

## 2025-01-07 PROCEDURE — 99213 OFFICE O/P EST LOW 20 MIN: CPT | Performed by: STUDENT IN AN ORGANIZED HEALTH CARE EDUCATION/TRAINING PROGRAM

## 2025-01-07 PROCEDURE — 3078F DIAST BP <80 MM HG: CPT | Performed by: STUDENT IN AN ORGANIZED HEALTH CARE EDUCATION/TRAINING PROGRAM

## 2025-01-07 PROCEDURE — 3075F SYST BP GE 130 - 139MM HG: CPT | Performed by: STUDENT IN AN ORGANIZED HEALTH CARE EDUCATION/TRAINING PROGRAM

## 2025-01-07 RX ORDER — MISOPROSTOL 200 UG/1
400 TABLET ORAL ONCE
Qty: 2 TABLET | Refills: 0 | Status: SHIPPED | OUTPATIENT
Start: 2025-01-26 | End: 2025-01-26

## 2025-01-07 NOTE — PROGRESS NOTES
Name: Ade Moon      : 1940      MRN: [id number]  Encounter Provider: Adrian Huerta MD  Encounter Date: 2025   Encounter department: [facility] [place]  :  Assessment & Plan  PMB (postmenopausal bleeding)  Tentatively scheduled for EUA, hysteroscopy, D&C on 25  No further PMB episodes  Will need cardiac clearance before procedure with recent ED visit for chest pain  PO cytotec to be taken 4 hours before procedure in setting of cervical stenosis  RTO for 1-2 week postoperative visit  Orders:    miSOPROStol (Cytotec) 200 mcg tablet; Take 2 tablets (400 mcg total) by mouth 1 (one) time for 1 dose Please take medication 4 hours before your surgery with water Do not start before 2025.        History of Present Illness   HPI  Ade Moon is a 84 y.o. female who presents who preoperative visit for exam under anesthesia, hysteroscopy, dilation and curettage procedure in the setting of postmenopausal bleeding. History significant for multiple comorbidities including atrial fibrillation managed with an implanted pacemaker, coronary artery disease, chronic heart failure, history of right MCA infarction with left-sided deficits, hypertension, stage III chronic kidney disease, obstructive sleep apnea, among others. Most recently presenting on  to the ED for complaint of chest pain - denies ongoing chest pain or cardiopulmonary symptoms. Conversation completed in [place] per patient preference. Patient's son present during counseling. We discussed prior EMB unable to be completed secondary to cervical stenosis. Recommended management with PO cytotec prior to procedure for cervical softening. We also discussed need for cardiac clearance given recent presentation for above symptoms. If not a candidate for anesthesia, can consider PO cytotec prior to repeat attempt at EMB. All questions answered.      Review of Systems   Constitutional:  Negative for chills  and fever.   HENT:  Negative for congestion, sinus pressure and sinus pain.    Eyes:  Negative for visual disturbance.   Respiratory:  Negative for cough, shortness of breath and wheezing.    Cardiovascular:  Negative for chest pain, palpitations and leg swelling.   Gastrointestinal:  Negative for abdominal pain, constipation, diarrhea, nausea and vomiting.   Genitourinary:  Negative for dysuria, vaginal bleeding and vaginal discharge.   Skin:  Negative for color change, pallor and rash.   Neurological:  Negative for weakness and light-headedness.   Psychiatric/Behavioral:  Negative for agitation and behavioral problems.      Past Medical History   Past Medical History:   Diagnosis Date    A-fib (HCC)     Anemia     Arthritis     Breast pain, right     Chest pain on breathing     Colon polyp     Cough     Diverticulosis     Encounter for screening colonoscopy     H/O sick sinus syndrome     Heart murmur     High cholesterol     History of atrial fibrillation     Rate ontrolled. On xarelto 15mg (creatinine 50) Followed by Dr. Randolph with Cardiology     History of weight loss     Report loose fitting clothes. Weight stable (3lbs difference). Last colo showed small tubular adenoma x2. Breast biopsy last showed fibrocystic changes. TSH wnl. Will check cbc, bmp, spep.        Hx of long term use of blood thinners     Hypertension     Irregular heart beat     RICH (obstructive sleep apnea)     Pacemaker     Preop examination     Shortness of breath     Viral bronchitis      Past Surgical History:   Procedure Laterality Date    BREAST BIOPSY Right 08/17/2006    ultrasound guided Percutaneous Needle Core -Benign    CATARACT EXTRACTION      CATARACT EXTRACTION W/ INTRAOCULAR LENS  IMPLANT, BILATERAL      COLONOSCOPY      COLONOSCOPY N/A 05/22/2018    Procedure: COLONOSCOPY;  Surgeon: Jenn Jang DO;  Location: [facility];  Service: Gastroenterology    INSERT / REPLACE / REMOVE PACEMAKER      LEG SURGERY Right     as a  child after a fall    MAMMO STEREOTACTIC BREAST BIOPSY RIGHT (ALL INC) Right 10/2014    benign    AR CMBND ANTERPOST COLPORRAPHY W/CYSTO N/A 03/17/2016    Procedure: COLPORRHAPHY ANTERIOR POSTERIOR ;  Surgeon: Dario Braden MD;  Location: [facility];  Service: Gynecology    AR COLONOSCOPY FLX DX W/COLLJ SPEC WHEN PFRMD N/A 04/04/2019    Procedure: COLONOSCOPY;  Surgeon: Jenn Jang DO;  Location: [facility];  Service: Gastroenterology    AR COLPOPEXY VAGINAL EXTRAPERITONEAL APPROACH N/A 03/17/2016    Procedure: COLPOPEXY VAGINAL EXTRAPERITONEAL (VEC) ANTERIOR ;  Surgeon: Dario Braden MD;  [facility];  Service: Gynecology    AR CYSTOURETHROSCOPY N/A 03/17/2016    Procedure: CYSTOSCOPY;  Surgeon: Dario Braden MD;  [facility];  Service: Gynecology    AR ESOPHAGOGASTRODUODENOSCOPY TRANSORAL DIAGNOSTIC N/A 04/16/2018    Procedure: EGD AND COLONOSCOPY;  Surgeon: Jenn Jang DO;  [facility];  Service: Gastroenterology    AR LAPAROSCOPY COLECTOMY PARTIAL W/ANASTOMOSIS Right 01/13/2022    Procedure: Diagnostic laparoscopy, laparscopic right colectomy;  Surgeon: Andriy Guevara MD;  Location: [facility];  Service: Colorectal    AR SLING OPERATION STRESS INCONTINENCE N/A 03/17/2016    Procedure: INSERTION PUBOVAGINAL SLING SINGLE INCISION ;  Surgeon: Dario Braden MD;  [facility];  Service: Gynecology     Family History   Problem Relation Age of Onset    Diabetes Mother     Breast cancer Sister 53    No Known Problems Father     No Known Problems Maternal Grandmother     No Known Problems Maternal Grandfather     No Known Problems Paternal Grandmother     No Known Problems Paternal Grandfather     No Known Problems Daughter     No Known Problems Son     No Known Problems Sister     No Known Problems Sister     No Known Problems Brother     No Known Problems Brother     No Known Problems Sister     No Known Problems Paternal Aunt     No Known  Problems Paternal Aunt     No Known Problems Paternal Aunt     No Known Problems Paternal Aunt       reports that she has never smoked. She has never used smokeless tobacco. She reports that she does not drink alcohol and does not use drugs.  Current Outpatient Medications on File Prior to Visit   Medication Sig Dispense Refill    acetaminophen (TYLENOL) 325 mg tablet Take 2 tablets (650 mg total) by mouth every 6 (six) hours as needed for mild pain  0    ammonium lactate (LAC-HYDRIN) 12 % cream APPLY TOPICALLY AS NEEDED FOR DRY SKIN 385 g 3    Blood Pressure Monitoring (Blood Pressure Cuff) MISC Use every other day For HTN 1 each 0    cyanocobalamin 1,000 mcg/mL INJECT 1 ML (1,000 MCG TOTAL) INTO A MUSCLE EVERY 30 (THIRTY) DAYS 3 mL 3    gabapentin (NEURONTIN) 100 mg capsule TAKE 1 CAPSULE (100 MG TOTAL) BY MOUTH 3 (THREE) TIMES A DAY AS NEEDED (AS NEEDED FOR LEFT FOOT PAIN) 90 capsule 3    Incontinence Supply Disposable (RA Adult Wipes) MISC Use 4 (four) times a day 64 each 10    Jardiance 10 MG TABS tablet TAKE 1 TABLET (10 MG TOTAL) BY MOUTH EVERY MORNING 90 tablet 5    losartan (COZAAR) 50 mg tablet Take 1 tablet (50 mg total) by mouth 2 (two) times a day 180 tablet 5    nebivolol (BYSTOLIC) 5 mg tablet TAKE 1 TABLET (5 MG TOTAL) BY MOUTH DAILY 90 tablet 3    nitroglycerin (NITROSTAT) 0.4 mg SL tablet Place 1 tablet (0.4 mg total) under the tongue every 5 (five) minutes as needed for chest pain  0    Pradaxa 150 MG capsu SB JUAN ANTONIO CAPSULA POR VIA ORAL DOS VECES AL KATRINA 180 capsule 3    rosuvastatin (CRESTOR) 5 mg tablet TAKE 1 TABLET (5 MG TOTAL) BY MOUTH DAILY 90 tablet 3    spironolactone (ALDACTONE) 25 mg tablet TAKE 1 TABLET (25 MG TOTAL) BY MOUTH DAILY 90 tablet 1    verapamil (CALAN-SR) 240 mg CR tablet TAKE 1 TABLET (240 MG TOTAL) BY MOUTH DAILY AT BEDTIME 90 tablet 3    pantoprazole (PROTONIX) 40 mg tablet TAKE 1 TABLET (40 MG TOTAL) BY MOUTH DAILY IN THE EARLY MORNING (Patient not taking: Reported on  "1/7/2025) 90 tablet 3    [DISCONTINUED] apixaban (Eliquis) 5 mg Take 1 tablet (5 mg total) by mouth 2 (two) times a day 60 tablet 0     Current Facility-Administered Medications on File Prior to Visit   Medication Dose Route Frequency Provider Last Rate Last Admin    cyanocobalamin injection 1,000 mcg  1,000 mcg Intramuscular Q30 Days Deepak Hermosillo MD   1,000 mcg at 11/18/24 0938     Allergies   Allergen Reactions    Other Itching     Bandaids    Tape [Medical Tape] Itching         Objective   /66 (BP Location: Right arm, Patient Position: Sitting, Cuff Size: Standard)   Ht 5' 3\" (1.6 m)   Wt 64.9 kg (143 lb)   BMI 25.33 kg/m²      Physical Exam  Vitals and nursing note reviewed.   Constitutional:       General: She is not in acute distress.  HENT:      Head: Normocephalic.      Right Ear: External ear normal.      Left Ear: External ear normal.   Eyes:      General: No scleral icterus.        Right eye: No discharge.         Left eye: No discharge.      Conjunctiva/sclera: Conjunctivae normal.   Cardiovascular:      Rate and Rhythm: Normal rate and regular rhythm.      Pulses: Normal pulses.      Heart sounds: Normal heart sounds.   Pulmonary:      Effort: Pulmonary effort is normal. No respiratory distress.      Breath sounds: Normal breath sounds.   Abdominal:      General: Abdomen is flat. There is no distension.      Palpations: Abdomen is soft.      Tenderness: There is no abdominal tenderness. There is no guarding.   Musculoskeletal:         General: No swelling or tenderness. Normal range of motion.      Cervical back: Normal range of motion.      Right lower leg: No edema.      Left lower leg: No edema.   Skin:     General: Skin is warm and dry.      Capillary Refill: Capillary refill takes less than 2 seconds.   Neurological:      Mental Status: She is alert and oriented to person, place, and time. Mental status is at baseline.   Psychiatric:         Mood and Affect: Mood normal.         " Behavior: Behavior normal.         Administrative Statements   I have spent a total time of 30 minutes in caring for this patient on the day of the visit/encounter including Diagnostic results, Prognosis, Risks and benefits of tx options, Instructions for management, Patient and family education, Importance of tx compliance, Risk factor reductions, Impressions, Counseling / Coordination of care, Documenting in the medical record, Reviewing / ordering tests, medicine, procedures  , Obtaining or reviewing history  , and Communicating with other healthcare professionals .

## 2025-01-08 ENCOUNTER — TELEPHONE (OUTPATIENT)
Dept: CARDIOLOGY CLINIC | Facility: CLINIC | Age: 85
End: 2025-01-08

## 2025-01-08 NOTE — TELEPHONE ENCOUNTER
----- Message from Shayne Herrmann MD sent at 1/7/2025 12:26 PM EST -----  Regarding: Please help schedule this patient as below  She need to be scheduled either with me or any AP in next 1 week (2 weeks max) for a pre op evaluation for upcoming D&C 1/27/25.      Thanks    - Shyane

## 2025-01-10 ENCOUNTER — OFFICE VISIT (OUTPATIENT)
Dept: CARDIOLOGY CLINIC | Facility: CLINIC | Age: 85
End: 2025-01-10
Payer: MEDICARE

## 2025-01-10 VITALS
HEART RATE: 77 BPM | HEIGHT: 63 IN | OXYGEN SATURATION: 99 % | DIASTOLIC BLOOD PRESSURE: 68 MMHG | BODY MASS INDEX: 25.46 KG/M2 | SYSTOLIC BLOOD PRESSURE: 130 MMHG | WEIGHT: 143.7 LBS

## 2025-01-10 DIAGNOSIS — N18.30 BENIGN HYPERTENSION WITH CHRONIC KIDNEY DISEASE, STAGE III (HCC): ICD-10-CM

## 2025-01-10 DIAGNOSIS — I51.7 LVH (LEFT VENTRICULAR HYPERTROPHY): ICD-10-CM

## 2025-01-10 DIAGNOSIS — R07.9 CHEST PAIN: ICD-10-CM

## 2025-01-10 DIAGNOSIS — I50.32 CHRONIC HEART FAILURE WITH PRESERVED EJECTION FRACTION (HCC): ICD-10-CM

## 2025-01-10 DIAGNOSIS — I12.9 BENIGN HYPERTENSION WITH CHRONIC KIDNEY DISEASE, STAGE III (HCC): ICD-10-CM

## 2025-01-10 DIAGNOSIS — Z95.0 PRESENCE OF PERMANENT CARDIAC PACEMAKER: ICD-10-CM

## 2025-01-10 DIAGNOSIS — N18.32 CKD STAGE G3B/A1, GFR 30-44 AND ALBUMIN CREATININE RATIO <30 MG/G (HCC): ICD-10-CM

## 2025-01-10 DIAGNOSIS — I10 HYPERTENSION, UNSPECIFIED TYPE: ICD-10-CM

## 2025-01-10 DIAGNOSIS — Z01.818 PRE-OP EXAM: Primary | ICD-10-CM

## 2025-01-10 DIAGNOSIS — I77.9 BILATERAL CAROTID ARTERY DISEASE, UNSPECIFIED TYPE (HCC): ICD-10-CM

## 2025-01-10 DIAGNOSIS — I25.10 CORONARY ARTERY DISEASE INVOLVING NATIVE CORONARY ARTERY OF NATIVE HEART WITHOUT ANGINA PECTORIS: ICD-10-CM

## 2025-01-10 PROCEDURE — 99214 OFFICE O/P EST MOD 30 MIN: CPT | Performed by: STUDENT IN AN ORGANIZED HEALTH CARE EDUCATION/TRAINING PROGRAM

## 2025-01-10 RX ORDER — ROSUVASTATIN CALCIUM 20 MG/1
20 TABLET, COATED ORAL DAILY
Qty: 30 TABLET | Refills: 17 | Status: SHIPPED | OUTPATIENT
Start: 2025-01-10 | End: 2026-07-04

## 2025-01-10 RX ORDER — ISOSORBIDE MONONITRATE 30 MG/1
30 TABLET, EXTENDED RELEASE ORAL DAILY
Qty: 30 TABLET | Refills: 17 | Status: SHIPPED | OUTPATIENT
Start: 2025-01-10 | End: 2026-07-04

## 2025-01-10 NOTE — PROGRESS NOTES
"Advanced Heart Failure/Pulmonary Hypertension Outpatient Note - Ade Moon 84 y.o. female MRN: [id number]    @ Encounter: [id number]    Assessment:  84 y.o. female Hx per chart p/w HF fu.  I first met patient 8/7/23.    Follows primarily with EP Dr. Canela in past  AF on AC  HFpEF, RV size/fxn wnl  LVH, 2/2024 TTE IVSd 1.3cm  PYP scan was not suggestive of cardiac amyloidosis. Perugini 0.  Cannot get CMR 2/2 her PPM model.  AL screen not suggestive.  Mild AS, mild AI  PH. +RVOT notching. Significant group II contribution likely.  2023 echo with suggestion of borderline elevated filling pressures, worse on R. Note the maxTRV is measured overly generously. RVSP was borderline elevated by echo.  CVA with L hemiparesis and chronic LLE edema  Tachybrady syndrome s/p S-PPM, VVI pacemaker. Per chart:  \"She had left sided erosion remotely and lead extracted. Paces up to 76 % since they increased bystolic\"  Nonobstructive CAD. Had 2021 LHC for borderline stress test, revealed nonobstructive dz.  2021 LHC:  CORONARY VESSELS:   --  The coronary circulation is right dominant.  --  Left main: The vessel was medium sized. Angiography showed minor luminal irregularities.  --  Proximal LAD: There was a 30 % stenosis.  --  Mid LAD: There was a 40 % stenosis. It appears amenable to percutaneous intervention.  --  Circumflex: The vessel was medium to large sized. Angiography showed mild atherosclerosis. There were two major obtuse marginals.  --  RCA: The vessel was small to medium sized. Angiography showed moderate atherosclerosis.  --  Mid RCA: There was a 50 % stenosis. It appears amenable to percutaneous intervention.  HTN  CKD  Anemia  Vit B12 deficient and gets injections      I have reviewed all pertinent patient data including but not limited to:    Lab Results   Component Value Date    CREATININE 1.07 12/31/2024     Lab Results   Component Value Date    K 4.0 12/31/2024     Lab Results   Component Value Date    HGBA1C " 5.4 08/13/2022     Lab Results   Component Value Date    JRM1QYTDHCJY 3.876 09/19/2024    TSH 1.93 09/14/2022     Lab Results   Component Value Date    LDLCALC 50 08/30/2023     Lab Results   Component Value Date     (H) 08/30/2023      Lab Results   Component Value Date    NTBNP 778 (H) 08/09/2021      Serum immunofixation shows no monoclonal immunoglobulins.   UPEP: no M spike  FLA ratio 2.2 - minimally elevated (normal in CKD)  Remote RF and anti-CCP both wnl    TODAY'S PLAN:     01/10/25  Warm, euvolemic  No new cardiac complaints, feels generally well outside of her 12/31/24 ED visit  She does not have current or recent new CP/sob/dizziness/palpitations/syncope/fatigue outside of her isolated chest pain event 12/31/24  BP was measured 210/98 at that time the ED.  No progressive anginal pattern.  She does not check BP at home, trend is elevated.  Recent ECG vpaced    Add imdur 30 qd    Cw BP monitoring    Escalate crestor 5 to 20 qd  No past H/O intolerance reported    Tentatively scheduled for EUA, hysteroscopy, D&C on 1/27/25   Cardiac pre op evaluation:  - the patient is low-moderate risk for non-cardiac surgery  - the patient likely does not routinely challenge themself to complete 4 mets exertion  - no evidence of acute cardiac issue including decompensated HF, uncontrolled arrhythmia, severe symptomatic valvulopathy, or an ACS  - no further cardiac testing planned at this time  - continue current home medications with any ervin-op modifications per anesthesia/surgery teams, in addition to my below recommendations  - continue bblocker uninterrupted  - AC plan: can hold pradaxa x 2 days pre op    Cw aldactone 25 qd  On lasix on past, not as of 1/10/2025  cw losartan 50 bid  jardiance 10 qd    On nebivolol 5 qd and verapamil 240 qd since before we met    On AC    On statin    Cw monitoring her Chronic heavy vpacing burden    Follow up:  With me in 6 months or sooner if symptoms evolve.  In addition to  follow up with their other medical providers    Key info from my prior notes:    Heme suggests abnl FLA ratio related to renal dz and advised renal cx  Appreciate heme recs    No recent PPM interrogations available to me now  Note Prior heavy vpacing burden    Reduce lasix 40 qd to 20 qd now  Advised copious hydration    Heme referral for elevated FLA ratio  Complicated by DARRIUS on possible CKD (though no clear Hx of CKD)  also in setting of her cardiac LVH and (minimal) concern for infiltrative CM, see prior comments    FLA ratio would be abnl if no CKD  Note She does not have significant CKD historically  Given workup to date, Lower suspicion but some mixed signals of infiltrative CM  Workup so far points away from aTTR CA  AL less likely given clinical course, though possible and FLA ratio not well explained as of yet  cannot check CMR 2/2 PPM model  Most past ECGs vpaced  No recent strain imaging    Serum immunofixation shows no monoclonal immunoglobulins.   UPEP: no M spike  FLA ratio 2.2 and 2.7 on repeat - minimally elevated absolute numbers and wnl if has CKD. These FLA ratios done when had DARRIUS  pyp without evidence of cardiac amyloidosis    Dopplers neg for DVT    HR in 70s now, heavy V pacing burden, minimal room for liberalization it seems  Fu EP  Extensive vpacing - monitor symptoms after diuresis and BP control, recheck echo and consider med change vs biv upgrade pending course (complex in her given age, frailty, past PPM lead extraction issues)    I am meeting patient for the first time today  Warm, mildly vol up on exam  Lasix recently doubled to lasix 20 bid from qd - within past week - making more urine, she feels slightly better now  Her main complaint is fatigue and SOB, sometimes profound, worse over past year since CVA and also after covid vaccine she says  High RV pacing noted  Office and her home SBP consistently in 130-140s, she keeps good log at home    Her symptoms are likely  multifactorial    Limited role for RHC/EMB at present time  May reconsider pending Tx as above    Her echo and clinical Hx suggests possible infiltrative CM such as cardiac amyloidosis  Of course she certainly has chronic HTN that can explain similar findings  Will pursue CA workup now - AL screen and pyp ordered  We discussed possible additional questions these tests may raise and potential need for more downstream tests if this leads us to confusion    PPM,  ICD , CRT (if applicable):  Interrogation:  11/2024  MDT-SINGLE CHAMBER PPM / NOT MRI CONDITIONAL   [facility]. BATTERY VOLTAGE ADEQUATE. (7.6 YRS)  68%. ALL LEAD PARAMETERS WITHIN NORMAL LIMITS. NO SIGNIFICANT HIGH RATE EPISODES. NO PROGRAMMING CHANGES MADE TO DEVICE PARAMETERS. NORMAL DEVICE FUNCTION     Studies:  I have reviewed all pertinent patient data/labs/imaging where available, including but not limited to the below studies. Selected results may be displayed here but comprehensive listing is omitted for note clarity and can be found in the epic chart.    ECG.    Echo.    Stress.    Cath.    HPI:   83 y.o. female Hx per chart p/w HF fu.  No new CP/SOB/dizziness/palpitations/syncope.  No new fatigue.  No new unintentional weight changes.  No new leg swelling, PND, pillow orthopnea.  No new fevers, chills, cough, nausea, vomiting, diarrhea, dysuria.      Interval History:   As noted in 'plan' section above and prior epic chart notes.    No new CP/SOB/dizziness/palpitations/syncope.  No new fatigue.  No new unintentional weight changes.  No new leg swelling, PND, pillow orthopnea.  No new fevers, chills, cough, nausea, vomiting, diarrhea, dysuria.    Past Medical History:   Diagnosis Date    A-fib (HCC)     Anemia     Arthritis     Breast pain, right     Chest pain on breathing     Colon polyp     Cough     Diverticulosis     Encounter for screening colonoscopy     H/O sick sinus syndrome     Heart murmur     High  cholesterol     History of atrial fibrillation     Rate ontrolled. On xarelto 15mg (creatinine 50) Followed by Dr. Randolph with Cardiology     History of weight loss     Report loose fitting clothes. Weight stable (3lbs difference). Last colo showed small tubular adenoma x2. Breast biopsy last showed fibrocystic changes. TSH wnl. Will check cbc, bmp, spep.        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) 09/10/2024    Benign hypertension with chronic kidney disease, stage III (formerly [facility]) 09/10/2024    Metabolic alkalosis 09/10/2024    Onychomycosis 07/08/2024    At high risk for falls 03/13/2024    (HFpEF) heart failure with preserved ejection fraction (formerly [facility]) 03/13/2024    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    Dyspnea 08/07/2023    Foot drop, left foot 03/27/2023    RICH (obstructive sleep apnea)     Pulmonary hypertension (formerly [facility]) 11/23/2022    Belching 10/05/2022    Transient neurological symptoms 09/08/2022    Thrush 09/06/2022    At risk for delirium 09/06/2022    Valvular heart disease 08/20/2022    CAD (coronary artery disease) 08/20/2022    Urinary retention 08/20/2022    Adjustment disorder with mixed emotional features 08/20/2022    Anemia 08/19/2022    At risk for venous thromboembolism (VTE) 08/19/2022    Spastic hemiparesis of left dominant side as late effect of cerebral infarction (HCC) 08/19/2022    History of CVA (cerebrovascular accident) 08/12/2022    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 (HCC) 08/12/2022    Abnormal nuclear stress test 08/20/2021    Diverticulosis of colon 05/20/2019    Pacemaker 02/26/2018    Tubulovillous adenoma 02/09/2018    Gastroesophageal reflux disease without esophagitis 02/09/2018    Essential hypertension 12/08/2017    Hyperlipidemia LDL goal <100 12/08/2017    Stage 3b chronic kidney disease (HCC) 12/08/2017    Osteoarthritis of both wrists 12/08/2017    Chronic atrial fibrillation (HCC) 12/08/2017    Cavus deformity of foot 06/27/2017    Hallux abducto valgus, bilateral 06/27/2017    Lipoma of right upper extremity 05/17/2017    Pain due to onychomycosis of toenails of both feet 01/20/2017    Allergic rhinitis due to pollen 10/12/2015    Midline cystocele 12/23/2014    Osteoarthritis of hip 07/17/2014    B12 deficiency 12/06/2013    Osteopenia 11/15/2013    Left renal artery stenosis (HCC) 08/27/2013    Left atrial enlargement 08/27/2013       ROS:  10 point ROS negative except as specified in HPI/interval history    Allergies   Allergen Reactions    Other Itching     Bandaids    Tape [Medical Tape] Itching       Current Outpatient Medications   Medication Instructions    acetaminophen (TYLENOL) 650 mg, Oral, Every 6 hours PRN    ammonium lactate (LAC-HYDRIN) 12 % cream Topical, As needed    Blood Pressure Monitoring (Blood Pressure Cuff) MISC Does not apply, Every other day, For HTN    cyanocobalamin 1,000 mcg, Intramuscular, Every 30 days    gabapentin (NEURONTIN) 100 mg, Oral, 3 times daily PRN    Incontinence Supply Disposable (RA Adult Wipes) MISC Does not apply, 4 times daily    isosorbide mononitrate (IMDUR) 30 mg, Oral, Daily    Jardiance 10 mg, Oral, Every morning    losartan (COZAAR) 50 mg, Oral, 2 times daily    [START ON 1/26/2025] miSOPROStol (CYTOTEC) 400 mcg, Oral, Once, Please take medication 4 hours before your surgery with water    nebivolol (BYSTOLIC) 5 mg, Oral, Daily    nitroglycerin (NITROSTAT) 0.4 mg, Sublingual,  Every 5 minutes PRN    pantoprazole (PROTONIX) 40 mg, Oral, Daily (early morning)    Pradaxa 150 MG capsu SB JUAN ANTONIO CAPSULA POR VIA ORAL DOS VECES AL KATRINA    rosuvastatin (CRESTOR) 20 mg, Oral, Daily    spironolactone (ALDACTONE) 25 mg, Oral, Daily    verapamil (CALAN-SR) 240 mg, Oral, Daily at bedtime        Social History     Socioeconomic History    Marital status:      Spouse name: Not on file    Number of children: Not on file    Years of education: Not on file    Highest education level: Not on file   Occupational History    Occupation: retired   Tobacco Use    Smoking status: Never    Smokeless tobacco: Never   Vaping Use    Vaping status: Never Used   Substance and Sexual Activity    Alcohol use: Never    Drug use: No    Sexual activity: Not Currently     Comment:    Other Topics Concern    Not on file   Social History Narrative    Housing, household, and economic circumstances      Social Drivers of Health     Financial Resource Strain: Low Risk  (10/31/2024)    Overall Financial Resource Strain (CARDIA)     Difficulty of Paying Living Expenses: Not hard at all   Food Insecurity: No Food Insecurity (10/31/2024)    Hunger Vital Sign     Worried About Running Out of Food in the Last Year: Never true     Ran Out of Food in the Last Year: Never true   Transportation Needs: No Transportation Needs (10/31/2024)    PRAPARE - Transportation     Lack of Transportation (Medical): No     Lack of Transportation (Non-Medical): No   Physical Activity: Inactive (4/14/2021)    Exercise Vital Sign     Days of Exercise per Week: 0 days     Minutes of Exercise per Session: 0 min   Stress: No Stress Concern Present (4/14/2021)    [place] [employer] - Occupational Stress Questionnaire     Feeling of Stress : Not at all   Social Connections: Moderately Isolated (4/14/2021)    Social Connection and Isolation Panel [NHANES]     Frequency of Communication with Friends and Family: More than three  "times a week     Frequency of Social Gatherings with Friends and Family: More than three times a week     Attends Gnosticist Services: More than 4 times per year     Active Member of Clubs or Organizations: No     Attends Club or Organization Meetings: Never     Marital Status:    Intimate Partner Violence: Not At Risk (4/14/2021)    Humiliation, Afraid, Rape, and Kick questionnaire     Fear of Current or Ex-Partner: No     Emotionally Abused: No     Physically Abused: No     Sexually Abused: No   Housing Stability: Low Risk  (10/31/2024)    Housing Stability Vital Sign     Unable to Pay for Housing in the Last Year: No     Number of Times Moved in the Last Year: 1     Homeless in the Last Year: No       Family History   Problem Relation Age of Onset    Diabetes Mother     Breast cancer Sister 53    No Known Problems Father     No Known Problems Maternal Grandmother     No Known Problems Maternal Grandfather     No Known Problems Paternal Grandmother     No Known Problems Paternal Grandfather     No Known Problems Daughter     No Known Problems Son     No Known Problems Sister     No Known Problems Sister     No Known Problems Brother     No Known Problems Brother     No Known Problems Sister     No Known Problems Paternal Aunt     No Known Problems Paternal Aunt     No Known Problems Paternal Aunt     No Known Problems Paternal Aunt        Physical Exam:  Vitals:    01/10/25 1141   BP: 130/68   BP Location: Left arm   Patient Position: Sitting   Cuff Size: Standard   Pulse: 77   SpO2: 99%   Weight: 65.2 kg (143 lb 11.2 oz)   Height: 5' 3\" (1.6 m)     Constitutional: NAD, non toxic  Ears/nose/mouth/throat: atraumatic  CV: RRR, no JVD  Resp: ctabl  GI: Soft, NTND  MSK: no swollen joints in exposed areas  Extr: trace LE edema L>R (since CVA), warm LE  Pysche: Normal affect  Neuro: appropriate in conversation  Skin: dry and intact in exposed areas    Labs & Results:    Lab Results   Component Value Date    SODIUM " 138 12/31/2024    K 4.0 12/31/2024     12/31/2024    CO2 21 12/31/2024    BUN 24 12/31/2024    CREATININE 1.07 12/31/2024    GLUC 135 12/31/2024    CALCIUM 9.3 12/31/2024     Lab Results   Component Value Date    WBC 8.31 12/31/2024    HGB 13.2 12/31/2024    HCT 41.3 12/31/2024    MCV 97 12/31/2024     12/31/2024     Lab Results   Component Value Date     (H) 08/30/2023      Lab Results   Component Value Date    CHOLESTEROL 118 08/30/2023    CHOLESTEROL 100 08/13/2022    CHOLESTEROL 149 08/16/2021     Lab Results   Component Value Date    HDL 52 08/30/2023    HDL 48 (L) 08/13/2022    HDL 62 08/16/2021     Lab Results   Component Value Date    TRIG 78 08/30/2023    TRIG 71 08/13/2022    TRIG 101 08/16/2021     Lab Results   Component Value Date    NONHDLC 52 08/13/2022    NONHDLC 87 08/16/2021    NONHDLC 89 08/18/2020       Counseling / Coordination of Care  Greater than 50% of total time was spent with the patient and / or family counseling and / or coordination of care.  We discussed diagnoses, most recent studies, tests and any changes in treatment plan.    Thank you for the opportunity to participate in the care of this patient.    Shayne Herrmann MD  Attending Physician  Advanced Heart Failure and Transplant Cardiology  [facility]

## 2025-01-13 ENCOUNTER — CLINICAL SUPPORT (OUTPATIENT)
Dept: INTERNAL MEDICINE CLINIC | Facility: CLINIC | Age: 85
End: 2025-01-13

## 2025-01-13 ENCOUNTER — TELEPHONE (OUTPATIENT)
Dept: INTERNAL MEDICINE CLINIC | Facility: CLINIC | Age: 85
End: 2025-01-13

## 2025-01-13 ENCOUNTER — OFFICE VISIT (OUTPATIENT)
Dept: INTERNAL MEDICINE CLINIC | Facility: CLINIC | Age: 85
End: 2025-01-13

## 2025-01-13 ENCOUNTER — PATIENT OUTREACH (OUTPATIENT)
Dept: INTERNAL MEDICINE CLINIC | Facility: CLINIC | Age: 85
End: 2025-01-13

## 2025-01-13 VITALS
WEIGHT: 144 LBS | HEART RATE: 84 BPM | DIASTOLIC BLOOD PRESSURE: 64 MMHG | TEMPERATURE: 97.3 F | BODY MASS INDEX: 25.51 KG/M2 | SYSTOLIC BLOOD PRESSURE: 138 MMHG

## 2025-01-13 DIAGNOSIS — R07.9 CHEST PAIN, UNSPECIFIED TYPE: Primary | ICD-10-CM

## 2025-01-13 DIAGNOSIS — I63.30 CEREBRAL THROMBOSIS WITH CEREBRAL INFARCTION (HCC): ICD-10-CM

## 2025-01-13 DIAGNOSIS — I48.20 CHRONIC ATRIAL FIBRILLATION (HCC): ICD-10-CM

## 2025-01-13 DIAGNOSIS — I12.9 BENIGN HYPERTENSION WITH CHRONIC KIDNEY DISEASE, STAGE III (HCC): ICD-10-CM

## 2025-01-13 DIAGNOSIS — N93.9 VAGINAL BLEEDING: ICD-10-CM

## 2025-01-13 DIAGNOSIS — Z74.8 ASSISTANCE NEEDED WITH TRANSPORTATION: Primary | ICD-10-CM

## 2025-01-13 DIAGNOSIS — E53.8 B12 DEFICIENCY: Primary | Chronic | ICD-10-CM

## 2025-01-13 DIAGNOSIS — N18.30 BENIGN HYPERTENSION WITH CHRONIC KIDNEY DISEASE, STAGE III (HCC): ICD-10-CM

## 2025-01-13 DIAGNOSIS — I10 ESSENTIAL HYPERTENSION: ICD-10-CM

## 2025-01-13 PROBLEM — M21.372 FOOT DROP, LEFT FOOT: Status: RESOLVED | Noted: 2023-03-27 | Resolved: 2025-01-13

## 2025-01-13 PROCEDURE — 3078F DIAST BP <80 MM HG: CPT | Performed by: FAMILY MEDICINE

## 2025-01-13 PROCEDURE — 99497 ADVNCD CARE PLAN 30 MIN: CPT | Performed by: FAMILY MEDICINE

## 2025-01-13 PROCEDURE — 3075F SYST BP GE 130 - 139MM HG: CPT | Performed by: FAMILY MEDICINE

## 2025-01-13 RX ADMIN — CYANOCOBALAMIN 1000 MCG: 1000 INJECTION, SOLUTION INTRAMUSCULAR; SUBCUTANEOUS at 16:47

## 2025-01-13 NOTE — PROGRESS NOTES
SW has met with pt and has spoken with via  ID # [id number] as per her request to asssit [facility] re transportation / Lyft Services.  ALsopresetn at [facility] was pt's aide Jabari Mcconnell.  SW has reviewed kika[t has been previously approved for [facility] funding an an Aide can accompamy pt on the dood to door van.  SW has also assisted pt wit a call to [employer] and spoke to their Transportation line # [phone].  We spoke to Sabrina.  We were told:  For Medical : pt has 76 Medical rides per year or   (38 round trips).  This can be Doctors or pharmacy trips  For Non Medical:  pt has 24 trips or 12 round trips per year.  This can be for grocery trips,visits to friends etc.  SW has reviewed the [facility]'s Lyft Transportation Program and has reviewed it is very limited and can only be used if  there is no other way to get to the appointment.  Pt has signed a Lyft Waiver Certification in case this service is ever needed.    SW has reached out to pt's son Donal with her permission to review but SW had to leave him a message.  Mis has also asked pt how she feels she is managing with her current [place] Waiver hours?   She would like more but feels she is managing at present.  She will apply for more again when her renewal comes up.    Patient does not have any further questions, concerns, or other needs at this time.  Patient has my contact # and PCP office # if needed.  Social Work to remain available to assist as indicated.    Please re-consult Social Work if needed.

## 2025-01-13 NOTE — ASSESSMENT & PLAN NOTE
Lab Results   Component Value Date    EGFR 47 12/31/2024    EGFR 38 12/02/2024    EGFR 37 06/15/2024    CREATININE 1.07 12/31/2024    CREATININE 1.28 12/02/2024    CREATININE 1.31 (H) 06/15/2024

## 2025-01-13 NOTE — PROGRESS NOTES
Name: Ade Moon      : 1940      MRN: [id number]  Encounter Provider: Gogo Layton MD  Encounter Date: 2025   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)  BP well controlled , on crestor dose increased per cardiology       Chest pain, unspecified type  Patient had starred with L chest pain while watching TV i1 t was quite strong and worried her , she had no associated SOB , - palpitations , - N - radiation of the pain She had her usual routine of having coffee and crackers that morning , she hadn't had sandwich yet when she started with the pain , her Son advised she call 11, EMS brought her to ED en route she had ASA and NTG , CP was gone by time she got to ED and didn't recur   EKG no acute changes , troponin negative , cbc , cmp stable , [facility]  non obstructive dz , + hx Afib on AC , + HFpEF RV normal , mild AS mild AR, hx tachy tasha post pacer , she saw cardiology in follow up , crestor dose increased to 20 mg        Vaginal bleeding  Patient had PMB she is scheduled for D + C on 25        Chronic atrial fibrillation (HCC)  Rate controlled , chronic AC       BMI Counseling: Body mass index is 25.51 kg/m². The BMI is above normal. Nutrition recommendations include decreasing portion sizes, encouraging healthy choices of fruits and vegetables, decreasing fast food intake, consuming healthier snacks, limiting drinks that contain sugar and reducing intake of saturated and trans fat. Exercise recommendations include  exercising 3-5 times per week. Rationale for BMI follow-up plan is due to patient being overweight or obese.         History of Present Illness     HPI Patient here for follow up to ED visit on 12/31/24 she had L CP and SOB while watching TV she has coffee and 4 crackers in the morning , at 9 am , this is usual for she called EMS as the pain was so strong   and was taken to ED , Enroute she was given  ASA and NTG and the pain resolved  of her pain She hadn't been ill - fever - cough - congestion , - abdominal pain , - diarrhea   EKG no changes , troponin negative , cbc hgb 13.2 , CMP stable   Review of Systems   Constitutional:  Negative for chills and fever.   HENT:  Negative for ear pain and sore throat.    Eyes:  Negative for pain and visual disturbance.   Respiratory:  Negative for cough and shortness of breath.    Cardiovascular:  Negative for chest pain and palpitations.   Gastrointestinal:  Negative for abdominal pain and vomiting.   Genitourinary:  Negative for dysuria and hematuria.   Musculoskeletal:  Negative for arthralgias and back pain.   Skin:  Negative for color change and rash.   Neurological:  Negative for seizures and syncope.   All other systems reviewed and are negative.    Past Medical History:   Diagnosis Date    A-fib (HCC)     Anemia     Arthritis     Breast pain, right     Chest pain on breathing     Colon polyp     Cough     Diverticulosis     Encounter for screening colonoscopy     H/O sick sinus syndrome     Heart murmur     High cholesterol     History of atrial fibrillation     Rate ontrolled. On xarelto 15mg (creatinine 50) Followed by Dr. Randolph with Cardiology     History of weight loss     Report loose fitting clothes. Weight stable (3lbs difference). Last colo showed small tubular adenoma x2. Breast biopsy last showed fibrocystic changes. TSH wnl. Will check cbc, bmp, spep.        Hx of long term use of blood thinners     Hypertension     Irregular heart beat     RICH (obstructive  sleep apnea)     Pacemaker     Preop examination     Shortness of breath     Viral bronchitis      Past Surgical History:   Procedure Laterality Date    BREAST BIOPSY Right 08/17/2006    ultrasound guided Percutaneous Needle Core -Benign    CATARACT EXTRACTION      CATARACT EXTRACTION W/ INTRAOCULAR LENS  IMPLANT, BILATERAL      COLONOSCOPY      COLONOSCOPY N/A 05/22/2018    Procedure: COLONOSCOPY;  Surgeon: Jenn Jang DO;  [facility];  Service: Gastroenterology    INSERT / REPLACE / REMOVE PACEMAKER      LEG SURGERY Right     as a child after a fall    MAMMO STEREOTACTIC BREAST BIOPSY RIGHT (ALL INC) Right 10/2014    benign    CO CMBND ANTERPOST COLPORRAPHY W/CYSTO N/A 03/17/2016    Procedure: COLPORRHAPHY ANTERIOR POSTERIOR ;  Surgeon: Dario Braden MD;  [facility];  Service: Gynecology    CO COLONOSCOPY FLX DX W/COLLJ SPEC WHEN PFRMD N/A 04/04/2019    Procedure: COLONOSCOPY;  Surgeon: Jenn Jang DO;  [facility];  Service: Gastroenterology    CO COLPOPEXY VAGINAL EXTRAPERITONEAL APPROACH N/A 03/17/2016    Procedure: COLPOPEXY VAGINAL EXTRAPERITONEAL (VEC) ANTERIOR ;  Surgeon: Dario Braden MD;  [facility];  Service: Gynecology    CO CYSTOURETHROSCOPY N/A 03/17/2016    Procedure: CYSTOSCOPY;  Surgeon: Dario Braden MD;  [facility];  Service: Gynecology    CO ESOPHAGOGASTRODUODENOSCOPY TRANSORAL DIAGNOSTIC N/A 04/16/2018    Procedure: EGD AND COLONOSCOPY;  Surgeon: Jenn Jang DO;  [facility];  Service: Gastroenterology    CO LAPAROSCOPY COLECTOMY PARTIAL W/ANASTOMOSIS Right 01/13/2022    Procedure: Diagnostic laparoscopy, laparscopic right colectomy;  Surgeon: Andriy Guevara MD;  [facility];  Service: Colorectal    CO SLING OPERATION STRESS INCONTINENCE N/A 03/17/2016    Procedure: INSERTION PUBOVAGINAL SLING SINGLE INCISION ;  Surgeon: Dario Braden MD;  [facility];  Service:  Gynecology     Family History   Problem Relation Age of Onset    Diabetes Mother     Breast cancer Sister 53    No Known Problems Father     No Known Problems Maternal Grandmother     No Known Problems Maternal Grandfather     No Known Problems Paternal Grandmother     No Known Problems Paternal Grandfather     No Known Problems Daughter     No Known Problems Son     No Known Problems Sister     No Known Problems Sister     No Known Problems Brother     No Known Problems Brother     No Known Problems Sister     No Known Problems Paternal Aunt     No Known Problems Paternal Aunt     No Known Problems Paternal Aunt     No Known Problems Paternal Aunt      Social History     Tobacco Use    Smoking status: Never    Smokeless tobacco: Never   Vaping Use    Vaping status: Never Used   Substance and Sexual Activity    Alcohol use: Never    Drug use: No    Sexual activity: Not Currently     Comment:      Current Outpatient Medications on File Prior to Visit   Medication Sig    acetaminophen (TYLENOL) 325 mg tablet Take 2 tablets (650 mg total) by mouth every 6 (six) hours as needed for mild pain    ammonium lactate (LAC-HYDRIN) 12 % cream APPLY TOPICALLY AS NEEDED FOR DRY SKIN    Blood Pressure Monitoring (Blood Pressure Cuff) MISC Use every other day For HTN    cyanocobalamin 1,000 mcg/mL INJECT 1 ML (1,000 MCG TOTAL) INTO A MUSCLE EVERY 30 (THIRTY) DAYS    gabapentin (NEURONTIN) 100 mg capsule TAKE 1 CAPSULE (100 MG TOTAL) BY MOUTH 3 (THREE) TIMES A DAY AS NEEDED (AS NEEDED FOR LEFT FOOT PAIN)    Incontinence Supply Disposable (RA Adult Wipes) MISC Use 4 (four) times a day    isosorbide mononitrate (IMDUR) 30 mg 24 hr tablet Take 1 tablet (30 mg total) by mouth daily    Jardiance 10 MG TABS tablet TAKE 1 TABLET (10 MG TOTAL) BY MOUTH EVERY MORNING    losartan (COZAAR) 50 mg tablet Take 1 tablet (50 mg total) by mouth 2 (two) times a day    [START ON 1/26/2025] miSOPROStol (Cytotec) 200 mcg tablet Take 2 tablets  (400 mcg total) by mouth 1 (one) time for 1 dose Please take medication 4 hours before your surgery with water Do not start before January 26, 2025.    nebivolol (BYSTOLIC) 5 mg tablet TAKE 1 TABLET (5 MG TOTAL) BY MOUTH DAILY    nitroglycerin (NITROSTAT) 0.4 mg SL tablet Place 1 tablet (0.4 mg total) under the tongue every 5 (five) minutes as needed for chest pain (Patient not taking: Reported on 1/10/2025)    pantoprazole (PROTONIX) 40 mg tablet TAKE 1 TABLET (40 MG TOTAL) BY MOUTH DAILY IN THE EARLY MORNING (Patient not taking: Reported on 1/10/2025)    Pradaxa 150 MG capsu SB JUAN ANTONIO CAPSULA POR VIA ORAL DOS VECES AL KATRINA    rosuvastatin (CRESTOR) 20 MG tablet Take 1 tablet (20 mg total) by mouth daily    spironolactone (ALDACTONE) 25 mg tablet TAKE 1 TABLET (25 MG TOTAL) BY MOUTH DAILY    verapamil (CALAN-SR) 240 mg CR tablet TAKE 1 TABLET (240 MG TOTAL) BY MOUTH DAILY AT BEDTIME    [DISCONTINUED] apixaban (Eliquis) 5 mg Take 1 tablet (5 mg total) by mouth 2 (two) times a day     Allergies   Allergen Reactions    Other Itching     Bandaids    Tape [Medical Tape] Itching     Immunization History   Administered Date(s) Administered    COVID-19 MODERNA VACC 0.5 ML IM 02/19/2021, 05/27/2021    COVID-19 Pfizer Vac BIVALENT Jermaine-sucrose 12 Yr+ IM 09/09/2022    INFLUENZA 10/11/2005, 10/24/2006, 10/15/2007, 09/28/2015, 10/06/2016, 10/18/2017, 10/05/2022    Influenza Quadrivalent Preservative Free 3 years and older IM 10/06/2016    Influenza Quadrivalent, 6-35 Months IM 10/18/2017    Influenza Split High Dose Preservative Free IM 10/16/2024    Influenza, high dose seasonal 0.7 mL 10/12/2018, 09/23/2019, 09/23/2020, 09/22/2021, 10/05/2022, 10/19/2023    Influenza, seasonal, injectable 09/24/2013, 09/24/2014, 09/28/2015    Pneumococcal Conjugate 13-Valent 05/31/2016    Pneumococcal Polysaccharide PPV23 01/01/2001, 12/18/2019    Respiratory Syncytial Virus Vaccine (Recombinant) 10/16/2024    Tdap 03/12/2014    Zoster Vaccine  Recombinant 03/13/2024, 07/08/2024     Objective   /64 (BP Location: Left arm, Patient Position: Sitting, Cuff Size: Standard)   Pulse 84   Temp (!) 97.3 °F (36.3 °C) (Temporal)   Wt 65.3 kg (144 lb)   BMI 25.51 kg/m²     Physical Exam  Vitals and nursing note reviewed.   Constitutional:       General: She is not in acute distress.     Appearance: She is well-developed.      Comments: Skin with good color turgor , well hydrated ,no distress noted  elderly    HENT:      Head: Normocephalic and atraumatic.      Right Ear: No decreased hearing noted. No middle ear effusion. There is no impacted cerumen.      Left Ear: No decreased hearing noted.  No middle ear effusion. There is no impacted cerumen.      Mouth/Throat:      Pharynx: Oropharynx is clear.   Eyes:      Conjunctiva/sclera: Conjunctivae normal.   Neck:      Thyroid: No thyromegaly.   Cardiovascular:      Rate and Rhythm: Normal rate and regular rhythm.      Heart sounds: Normal heart sounds. No murmur heard.  Pulmonary:      Effort: Pulmonary effort is normal. No respiratory distress.      Breath sounds: Normal breath sounds.   Abdominal:      Palpations: Abdomen is soft.      Tenderness: There is no abdominal tenderness.   Musculoskeletal:         General: No swelling.      Cervical back: Neck supple.      Left lower leg: Swelling present.      Comments: LLE edema since CVA 2022   Lymphadenopathy:      Cervical:      Right cervical: No superficial cervical adenopathy.     Left cervical: No superficial cervical adenopathy.   Skin:     General: Skin is warm and dry.      Capillary Refill: Capillary refill takes less than 2 seconds.   Neurological:      Mental Status: She is alert.      Comments: Post CVA L hemiparesis , LLE edema , L foot drop she walks well with walker   Psychiatric:         Mood and Affect: Mood normal.

## 2025-01-13 NOTE — ASSESSMENT & PLAN NOTE
Patient had starred with L chest pain while watching TV i12/31/24 t was quite strong and worried her , she had no associated SOB , - palpitations , - N - radiation of the pain She had her usual routine of having coffee and crackers that morning , she hadn't had sandwich yet when she started with the pain , her Son advised she call 11, EMS brought her to ED en route she had ASA and NTG , CP was gone by time she got to ED and didn't recur   EKG no acute changes , troponin negative , cbc , cmp stable , LHC 2021 non obstructive dz , + hx Afib on AC , + HFpEF RV normal , mild AS mild AR, hx tachy tasha post pacer , she saw cardiology in follow up , crestor dose increased to 20 mg

## 2025-01-13 NOTE — PROGRESS NOTES
Patient came into the office for nurse visit for Vit B12 injection. Patient supplied the serum and 1.0 ml was given in left deltoid.

## 2025-01-15 ENCOUNTER — ANESTHESIA EVENT (OUTPATIENT)
Dept: PERIOP | Facility: HOSPITAL | Age: 85
End: 2025-01-15
Payer: MEDICARE

## 2025-01-16 RX ORDER — ACETAMINOPHEN 500 MG
500 TABLET ORAL EVERY 6 HOURS PRN
COMMUNITY

## 2025-01-16 RX ORDER — ROSUVASTATIN CALCIUM 5 MG/1
5 TABLET, COATED ORAL DAILY
COMMUNITY

## 2025-01-16 NOTE — PRE-PROCEDURE INSTRUCTIONS
Pre-Surgery Instructions:   Medication Instructions    acetaminophen (TYLENOL) 500 mg tablet Uses PRN- OK to take day of surgery    ammonium lactate (LAC-HYDRIN) 12 % cream Stop taking 1 day prior to surgery.    Blood Pressure Monitoring (Blood Pressure Cuff) MISC Take day of surgery.    cyanocobalamin 1,000 mcg/mL Monthly injection-pt unsure of  LD was in 12/2024    gabapentin (NEURONTIN) 100 mg capsule Take day of surgery.    isosorbide mononitrate (IMDUR) 30 mg 24 hr tablet Take day of surgery.    Jardiance 10 MG TABS tablet Stop taking 4 days prior to surgery.    losartan (COZAAR) 50 mg tablet Hold day of surgery.    [START ON 1/26/2025] miSOPROStol (Cytotec) 200 mcg tablet Instructions provided by MD-     nebivolol (BYSTOLIC) 5 mg tablet Take day of surgery.    Pradaxa 150 MG capsu Instructions provided by MD-Hold x 2 days pre-op    rosuvastatin (CRESTOR) 5 mg tablet Take night before surgery    spironolactone (ALDACTONE) 25 mg tablet Take night before surgery    verapamil (CALAN-SR) 240 mg CR tablet Take day of surgery.   Medication instructions for day surgery reviewed. Please use only a sip of water to take your instructed medications. Avoid all over the counter vitamins, supplements and NSAIDS for one week prior to surgery per anesthesia guidelines. Tylenol is ok to take as needed.     You will receive a call one business day prior to surgery with an arrival time and hospital directions. If your surgery is scheduled on a Monday, the hospital will be calling you on the Friday prior to your surgery. If you have not heard from anyone by 8pm, please call the hospital supervisor through the hospital  at [phone]. ([place] [phone] or [place] [phone]).    Do not eat or drink anything after midnight the night before your surgery, including candy, mints, lifesavers, or chewing gum. Do not drink alcohol 24hrs before your surgery. Try not to smoke at least 24hrs before your surgery.        Follow the pre surgery showering instructions as listed in the “My Surgical Experience Booklet” or otherwise provided by your surgeon's office. Do not use a blade to shave the surgical area 1 week before surgery. It is okay to use a clean electric clippers up to 24 hours before surgery. Do not apply any lotions, creams, including makeup, cologne, deodorant, or perfumes after showering on the day of your surgery. Do not use dry shampoo, hair spray, hair gel, or any type of hair products.     No contact lenses, eye make-up, or artificial eyelashes. Remove nail polish, including gel polish, and any artificial, gel, or acrylic nails if possible. Remove all jewelry including rings and body piercing jewelry.     Wear causal clothing that is easy to take on and off. Consider your type of surgery.    Keep any valuables, jewelry, piercings at home. Please bring any specially ordered equipment (sling, braces) if indicated.    Arrange for a responsible person to drive you to and from the hospital on the day of your surgery. Please confirm the visitor policy for the day of your procedure when you receive your phone call with an arrival time.     Call the surgeon's office with any new illnesses, exposures, or additional questions prior to surgery.    Please reference your “My Surgical Experience Booklet” for additional information to prepare for your upcoming surgery.

## 2025-01-23 PROCEDURE — NC001 PR NO CHARGE: Performed by: OBSTETRICS & GYNECOLOGY

## 2025-01-23 NOTE — H&P
History of Present Illness     HPI  Ade Moon is a 84 y.o. female who presents who preoperative visit for exam under anesthesia, hysteroscopy, dilation and curettage procedure in the setting of postmenopausal bleeding. History significant for multiple comorbidities including atrial fibrillation managed with an implanted pacemaker, coronary artery disease, chronic heart failure, history of right MCA infarction with left-sided deficits, hypertension, stage III chronic kidney disease, obstructive sleep apnea, among others. Most recently presenting on 12/31 to the ED for complaint of chest pain - denies ongoing chest pain or cardiopulmonary symptoms. Conversation completed in Libyan per patient preference. Patient's son present during counseling. We discussed prior EMB unable to be completed secondary to cervical stenosis. Recommended management with PO cytotec prior to procedure for cervical softening. We also discussed need for cardiac clearance given recent presentation for above symptoms. If not a candidate for anesthesia, can consider PO cytotec prior to repeat attempt at EMB. All questions answered.        Review of Systems   Constitutional:  Negative for chills and fever.   HENT:  Negative for congestion, sinus pressure and sinus pain.    Eyes:  Negative for visual disturbance.   Respiratory:  Negative for cough, shortness of breath and wheezing.    Cardiovascular:  Negative for chest pain, palpitations and leg swelling.   Gastrointestinal:  Negative for abdominal pain, constipation, diarrhea, nausea and vomiting.   Genitourinary:  Negative for dysuria, vaginal bleeding and vaginal discharge.   Skin:  Negative for color change, pallor and rash.   Neurological:  Negative for weakness and light-headedness.   Psychiatric/Behavioral:  Negative for agitation and behavioral problems.       Past Medical History  Medical History        Past Medical History:   Diagnosis Date    A-fib (HCC)      Anemia       Arthritis      Breast pain, right      Chest pain on breathing      Colon polyp      Cough      Diverticulosis      Encounter for screening colonoscopy      H/O sick sinus syndrome      Heart murmur      High cholesterol      History of atrial fibrillation       Rate ontrolled. On xarelto 15mg (creatinine 50) Followed by Dr. Randolph with Cardiology     History of weight loss       Report loose fitting clothes. Weight stable (3lbs difference). Last colo showed small tubular adenoma x2. Breast biopsy last showed fibrocystic changes. TSH wnl. Will check cbc, bmp, spep.        Hx of long term use of blood thinners      Hypertension      Irregular heart beat      RICH (obstructive sleep apnea)      Pacemaker      Preop examination      Shortness of breath      Viral bronchitis           Surgical History         Past Surgical History:   Procedure Laterality Date    BREAST BIOPSY Right 08/17/2006     ultrasound guided Percutaneous Needle Core -Benign    CATARACT EXTRACTION        CATARACT EXTRACTION W/ INTRAOCULAR LENS  IMPLANT, BILATERAL        COLONOSCOPY        COLONOSCOPY N/A 05/22/2018     Procedure: COLONOSCOPY;  Surgeon: Jenn Jang DO;  Location: [facility];  Service: Gastroenterology    INSERT / REPLACE / REMOVE PACEMAKER        LEG SURGERY Right       as a child after a fall    MAMMO STEREOTACTIC BREAST BIOPSY RIGHT (ALL INC) Right 10/2014     benign    DC CMBND ANTERPOST COLPORRAPHY W/CYSTO N/A 03/17/2016     Procedure: COLPORRHAPHY ANTERIOR POSTERIOR ;  Surgeon: Dario Braden MD;  Location: [facility];  Service: Gynecology    DC COLONOSCOPY FLX DX W/COLLJ SPEC WHEN PFRMD N/A 04/04/2019     Procedure: COLONOSCOPY;  Surgeon: Jenn Jang DO;  Location: [facility];  Service: Gastroenterology    DC COLPOPEXY VAGINAL EXTRAPERITONEAL APPROACH N/A 03/17/2016     Procedure: COLPOPEXY VAGINAL EXTRAPERITONEAL (VEC) ANTERIOR ;  Surgeon: Dario Braden MD;  [facility];  Service: Gynecology     HI CYSTOURETHROSCOPY N/A 03/17/2016     Procedure: CYSTOSCOPY;  Surgeon: Dario Braden MD;  [facility];  Service: Gynecology    HI ESOPHAGOGASTRODUODENOSCOPY TRANSORAL DIAGNOSTIC N/A 04/16/2018     Procedure: EGD AND COLONOSCOPY;  Surgeon: Jenn Jang DO;  [facility];  Service: Gastroenterology    HI LAPAROSCOPY COLECTOMY PARTIAL W/ANASTOMOSIS Right 01/13/2022     Procedure: Diagnostic laparoscopy, laparscopic right colectomy;  Surgeon: Andriy Guevara MD;  Location: [facility];  Service: Colorectal    HI SLING OPERATION STRESS INCONTINENCE N/A 03/17/2016     Procedure: INSERTION PUBOVAGINAL SLING SINGLE INCISION ;  Surgeon: Dario Braden MD;  [facility];  Service: Gynecology         Family History         Family History   Problem Relation Age of Onset    Diabetes Mother      Breast cancer Sister 53    No Known Problems Father      No Known Problems Maternal Grandmother      No Known Problems Maternal Grandfather      No Known Problems Paternal Grandmother      No Known Problems Paternal Grandfather      No Known Problems Daughter      No Known Problems Son      No Known Problems Sister      No Known Problems Sister      No Known Problems Brother      No Known Problems Brother      No Known Problems Sister      No Known Problems Paternal Aunt      No Known Problems Paternal Aunt      No Known Problems Paternal Aunt      No Known Problems Paternal Aunt            reports that she has never smoked. She has never used smokeless tobacco. She reports that she does not drink alcohol and does not use drugs.  Medications Ordered Prior to Encounter          Current Outpatient Medications on File Prior to Visit   Medication Sig Dispense Refill    acetaminophen (TYLENOL) 325 mg tablet Take 2 tablets (650 mg total) by mouth every 6 (six) hours as needed for mild pain   0    ammonium lactate (LAC-HYDRIN) 12 % cream APPLY TOPICALLY AS NEEDED FOR DRY SKIN 385 g 3    Blood  Pressure Monitoring (Blood Pressure Cuff) MISC Use every other day For HTN 1 each 0    cyanocobalamin 1,000 mcg/mL INJECT 1 ML (1,000 MCG TOTAL) INTO A MUSCLE EVERY 30 (THIRTY) DAYS 3 mL 3    gabapentin (NEURONTIN) 100 mg capsule TAKE 1 CAPSULE (100 MG TOTAL) BY MOUTH 3 (THREE) TIMES A DAY AS NEEDED (AS NEEDED FOR LEFT FOOT PAIN) 90 capsule 3    Incontinence Supply Disposable (RA Adult Wipes) MISC Use 4 (four) times a day 64 each 10    Jardiance 10 MG TABS tablet TAKE 1 TABLET (10 MG TOTAL) BY MOUTH EVERY MORNING 90 tablet 5    losartan (COZAAR) 50 mg tablet Take 1 tablet (50 mg total) by mouth 2 (two) times a day 180 tablet 5    nebivolol (BYSTOLIC) 5 mg tablet TAKE 1 TABLET (5 MG TOTAL) BY MOUTH DAILY 90 tablet 3    nitroglycerin (NITROSTAT) 0.4 mg SL tablet Place 1 tablet (0.4 mg total) under the tongue every 5 (five) minutes as needed for chest pain   0    Pradaxa 150 MG capsu SB JUAN ANTONIO CAPSULA POR VIA ORAL DOS VECES AL KATRINA 180 capsule 3    rosuvastatin (CRESTOR) 5 mg tablet TAKE 1 TABLET (5 MG TOTAL) BY MOUTH DAILY 90 tablet 3    spironolactone (ALDACTONE) 25 mg tablet TAKE 1 TABLET (25 MG TOTAL) BY MOUTH DAILY 90 tablet 1    verapamil (CALAN-SR) 240 mg CR tablet TAKE 1 TABLET (240 MG TOTAL) BY MOUTH DAILY AT BEDTIME 90 tablet 3    pantoprazole (PROTONIX) 40 mg tablet TAKE 1 TABLET (40 MG TOTAL) BY MOUTH DAILY IN THE EARLY MORNING (Patient not taking: Reported on 1/7/2025) 90 tablet 3    [DISCONTINUED] apixaban (Eliquis) 5 mg Take 1 tablet (5 mg total) by mouth 2 (two) times a day 60 tablet 0                Current Facility-Administered Medications on File Prior to Visit   Medication Dose Route Frequency Provider Last Rate Last Admin    cyanocobalamin injection 1,000 mcg  1,000 mcg Intramuscular Q30 Days Deepak Hermosillo MD   1,000 mcg at 11/18/24 0938         Allergies         Allergies   Allergen Reactions    Other Itching       Bandaids    Tape [Medical Tape] Itching            Objective     /66 (BP  "Location: Right arm, Patient Position: Sitting, Cuff Size: Standard)   Ht 5' 3\" (1.6 m)   Wt 64.9 kg (143 lb)   BMI 25.33 kg/m²      Physical Exam  Vitals and nursing note reviewed.   Constitutional:       General: She is not in acute distress.  HENT:      Head: Normocephalic.      Right Ear: External ear normal.      Left Ear: External ear normal.   Eyes:      General: No scleral icterus.        Right eye: No discharge.         Left eye: No discharge.      Conjunctiva/sclera: Conjunctivae normal.   Cardiovascular:      Rate and Rhythm: Normal rate and regular rhythm.      Pulses: Normal pulses.      Heart sounds: Normal heart sounds.   Pulmonary:      Effort: Pulmonary effort is normal. No respiratory distress.      Breath sounds: Normal breath sounds.   Abdominal:      General: Abdomen is flat. There is no distension.      Palpations: Abdomen is soft.      Tenderness: There is no abdominal tenderness. There is no guarding.   Musculoskeletal:         General: No swelling or tenderness. Normal range of motion.      Cervical back: Normal range of motion.      Right lower leg: No edema.      Left lower leg: No edema.   Skin:     General: Skin is warm and dry.      Capillary Refill: Capillary refill takes less than 2 seconds.   Neurological:      Mental Status: She is alert and oriented to person, place, and time. Mental status is at baseline.   Psychiatric:         Mood and Affect: Mood normal.         Behavior: Behavior normal.         "

## 2025-01-27 ENCOUNTER — ANESTHESIA (OUTPATIENT)
Dept: PERIOP | Facility: HOSPITAL | Age: 85
End: 2025-01-27
Payer: MEDICARE

## 2025-01-27 ENCOUNTER — HOSPITAL ENCOUNTER (OUTPATIENT)
Facility: HOSPITAL | Age: 85
Setting detail: OUTPATIENT SURGERY
Discharge: HOME | End: 2025-01-27
Attending: OBSTETRICS & GYNECOLOGY | Admitting: OBSTETRICS & GYNECOLOGY
Payer: MEDICARE

## 2025-01-27 VITALS
RESPIRATION RATE: 16 BRPM | TEMPERATURE: 98.7 F | BODY MASS INDEX: 24.96 KG/M2 | DIASTOLIC BLOOD PRESSURE: 69 MMHG | HEART RATE: 91 BPM | WEIGHT: 140.9 LBS | OXYGEN SATURATION: 99 % | SYSTOLIC BLOOD PRESSURE: 150 MMHG | HEIGHT: 63 IN

## 2025-01-27 DIAGNOSIS — Z98.890 STATUS POST DILATION AND CURETTAGE: Primary | ICD-10-CM

## 2025-01-27 DIAGNOSIS — N95.0 PMB (POSTMENOPAUSAL BLEEDING): ICD-10-CM

## 2025-01-27 PROCEDURE — 58558 HYSTEROSCOPY BIOPSY: CPT | Performed by: OBSTETRICS & GYNECOLOGY

## 2025-01-27 PROCEDURE — 88305 TISSUE EXAM BY PATHOLOGIST: CPT | Performed by: PATHOLOGY

## 2025-01-27 RX ORDER — ACETAMINOPHEN 325 MG/1
975 TABLET ORAL EVERY 6 HOURS PRN
Status: CANCELLED | OUTPATIENT
Start: 2025-01-27

## 2025-01-27 RX ORDER — SODIUM CHLORIDE, SODIUM LACTATE, POTASSIUM CHLORIDE, CALCIUM CHLORIDE 600; 310; 30; 20 MG/100ML; MG/100ML; MG/100ML; MG/100ML
50 INJECTION, SOLUTION INTRAVENOUS CONTINUOUS
Status: DISCONTINUED | OUTPATIENT
Start: 2025-01-27 | End: 2025-01-27 | Stop reason: HOSPADM

## 2025-01-27 RX ORDER — FENTANYL CITRATE/PF 50 MCG/ML
25 SYRINGE (ML) INJECTION
Status: DISCONTINUED | OUTPATIENT
Start: 2025-01-27 | End: 2025-01-27 | Stop reason: HOSPADM

## 2025-01-27 RX ORDER — IBUPROFEN 600 MG/1
600 TABLET, FILM COATED ORAL EVERY 6 HOURS PRN
Status: CANCELLED | OUTPATIENT
Start: 2025-01-27

## 2025-01-27 RX ORDER — FENTANYL CITRATE 50 UG/ML
INJECTION, SOLUTION INTRAMUSCULAR; INTRAVENOUS AS NEEDED
Status: DISCONTINUED | OUTPATIENT
Start: 2025-01-27 | End: 2025-01-27

## 2025-01-27 RX ORDER — ONDANSETRON 2 MG/ML
4 INJECTION INTRAMUSCULAR; INTRAVENOUS EVERY 6 HOURS PRN
Status: CANCELLED | OUTPATIENT
Start: 2025-01-27

## 2025-01-27 RX ORDER — PHENYLEPHRINE HCL IN 0.9% NACL 1 MG/10 ML
SYRINGE (ML) INTRAVENOUS AS NEEDED
Status: DISCONTINUED | OUTPATIENT
Start: 2025-01-27 | End: 2025-01-27

## 2025-01-27 RX ORDER — ACETAMINOPHEN 325 MG/1
650 TABLET ORAL EVERY 6 HOURS PRN
Qty: 30 TABLET | Refills: 0 | Status: SHIPPED | OUTPATIENT
Start: 2025-01-27

## 2025-01-27 RX ORDER — SODIUM CHLORIDE, SODIUM LACTATE, POTASSIUM CHLORIDE, CALCIUM CHLORIDE 600; 310; 30; 20 MG/100ML; MG/100ML; MG/100ML; MG/100ML
INJECTION, SOLUTION INTRAVENOUS CONTINUOUS PRN
Status: DISCONTINUED | OUTPATIENT
Start: 2025-01-27 | End: 2025-01-27

## 2025-01-27 RX ORDER — ONDANSETRON 2 MG/ML
4 INJECTION INTRAMUSCULAR; INTRAVENOUS ONCE AS NEEDED
Status: DISCONTINUED | OUTPATIENT
Start: 2025-01-27 | End: 2025-01-27 | Stop reason: HOSPADM

## 2025-01-27 RX ORDER — MAGNESIUM HYDROXIDE 1200 MG/15ML
LIQUID ORAL AS NEEDED
Status: DISCONTINUED | OUTPATIENT
Start: 2025-01-27 | End: 2025-01-27 | Stop reason: HOSPADM

## 2025-01-27 RX ORDER — PROPOFOL 10 MG/ML
INJECTION, EMULSION INTRAVENOUS CONTINUOUS PRN
Status: DISCONTINUED | OUTPATIENT
Start: 2025-01-27 | End: 2025-01-27

## 2025-01-27 RX ORDER — ONDANSETRON 2 MG/ML
INJECTION INTRAMUSCULAR; INTRAVENOUS AS NEEDED
Status: DISCONTINUED | OUTPATIENT
Start: 2025-01-27 | End: 2025-01-27

## 2025-01-27 RX ADMIN — FENTANYL CITRATE 25 MCG: 50 INJECTION INTRAMUSCULAR; INTRAVENOUS at 13:13

## 2025-01-27 RX ADMIN — PROPOFOL 100 MCG/KG/MIN: 10 INJECTION, EMULSION INTRAVENOUS at 13:13

## 2025-01-27 RX ADMIN — ONDANSETRON 4 MG: 2 INJECTION INTRAMUSCULAR; INTRAVENOUS at 13:16

## 2025-01-27 RX ADMIN — SODIUM CHLORIDE, SODIUM LACTATE, POTASSIUM CHLORIDE, AND CALCIUM CHLORIDE: .6; .31; .03; .02 INJECTION, SOLUTION INTRAVENOUS at 12:37

## 2025-01-27 RX ADMIN — Medication 100 MCG: at 13:29

## 2025-01-27 RX ADMIN — FENTANYL CITRATE 25 MCG: 50 INJECTION INTRAMUSCULAR; INTRAVENOUS at 13:40

## 2025-01-27 NOTE — OP NOTE
OPERATIVE REPORT  PATIENT NAME: Ade Moon    :  1940  MRN: [id number]  Pt Location:  OR ROOM 01    SURGERY DATE: 2025    Surgeons and Role:     * Maria A Bates MD - Primary     * Ila Travis MD - Assisting    Preop Diagnosis:  PMB (postmenopausal bleeding) [N95.0]    Post-Op Diagnosis Codes:     * PMB (postmenopausal bleeding) [N95.0]    Procedure(s):  DILATATION AND CURETTAGE (D&C) WITH HYSTEROSCOPY AND EXAM UNDER ANESTHESIA    Specimen(s):  ID Type Source Tests Collected by Time Destination   1 : Endometrial curretings Tissue Uterus TISSUE EXAM Maria A Bates MD 2025 1340        Estimated Blood Loss:   5mL    Drains:  None    Anesthesia Type:   IV Sedation with Anesthesia    Operative Indications:  PMB (postmenopausal bleeding) [N95.0]    Operative Findings:  1.  External genitalia grossly normal in appearance.  No ulcerations, no lacerations, no lesions.   2.  Vagina and cervix were grossly normal in appearance without any lacerations or lesions.   3. Hysteroscopic examination revealed atrophic endometrial lining. No pathology was visualized. Bilateral ostia were visualized.    Complications:   None    Procedure and Technique:  The patient was taken to the operating room where a time out was performed to confirm correct patient and correct procedure. Total Intravenous anesthesia (TIVA) was administered and the patient was positioned on the OR table in the dorsal lithotomy position. All pressure points were padded and a dashawn hugger was placed to maintain control of core body temperature. The patient was prepped and draped in the usual sterile fashion.     A weighted speculum was inserted into the vagina and a Italo retractor was used to visualize the anterior lip of the cervix, which was then grasped with a single toothed tenaculum. An Allis clamp was then applied to the posterior lip of the cervix. Cervical stenosis was noted with inability to pass a 13 German dilator, so a  lacrimation probe was used to initially dilate the cervix. The cervix was then serially dilated to 23 Uzbek using Mccain dilators for introduction of the hysteroscope.     The Symphion hysteroscope was introduced under direct visualization using normal saline solution as the distention media. Hysteroscope was advanced to the uterine fundus and the entire uterine cavity was inspected in a systematic manner with the findings above. Hysteroscope was withdrawn and sharp curetting was performed, starting at the 12'oclock position and rotating a total of 360 degrees to cover all surfaces. Endometrial tissue was obtained and sent for pathology.     The single toothed tenaculum and Allis clamp were removed from the anterior lip of the cervix. Good hemostasis was confirmed at the tenaculum puncture sites. Weighted speculum was then removed from the vagina.  At the conclusion of the procedure, all needle, sponge, and instrument counts were noted to be correct x2.     Dr. Bates was present and participated in all key portions of the case.         Patient Disposition:  PACU       SIGNATURE: Ila Travis MD  DATE: January 27, 2025  TIME: 1:52 PM

## 2025-01-27 NOTE — ANESTHESIA PREPROCEDURE EVALUATION
Procedure:  DILATATION AND CURETTAGE (D&C) WITH HYSTEROSCOPY AND EXAM UNDER ANESTHESIA (Uterus)    Relevant Problems   CARDIO   (+) CAD (coronary artery disease)   (+) Chest pain   (+) Chronic atrial fibrillation (HCC)   (+) Essential hypertension   (+) Hyperlipidemia LDL goal <100   (+) Left renal artery stenosis (HCC)   (+) Pacemaker   (+) Pulmonary hypertension (HCC)   (+) Valvular heart disease      GI/HEPATIC   (+) Gastroesophageal reflux disease without esophagitis      /RENAL   (+) Benign hypertension with chronic kidney disease, stage III (HCC)   (+) CKD stage G3b/A1, GFR 30-44 and albumin creatinine ratio <30 mg/g (HCC)   (+) 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  DEVICE INTERROGATED IN THE [place] OFFICE. BATTERY VOLTAGE ADEQUATE. (7.6 YRS)  68%. ALL LEAD PARAMETERS WITHIN NORMAL LIMITS. NO SIGNIFICANT HIGH RATE EPISODES. NO PROGRAMMING CHANGES MADE TO DEVICE PARAMETERS. NORMAL DEVICE FUNCTION.---OTERO       Left Ventricle: Left ventricular cavity size is normal. Wall thickness is severely increased. There is severe concentric hypertrophy. The left ventricular ejection fraction is 65%. Systolic function is normal. Wall motion is normal.    Left Atrium: The atrium is severely dilated (>48 mL/m2).    Right Atrium: The atrium is moderately dilated.    Aortic Valve: The leaflets are moderately thickened. The leaflets are moderately calcified. There is  moderately reduced mobility. There is mild to moderate regurgitation with a centrally directed jet. There is mild stenosis. The aortic valve peak velocity is 2.14 m/s. The aortic valve mean gradient is 10 mmHg. The dimensionless velocity index is 0.46. The aortic valve area is 1.49 cm2.    Mitral Valve: There is moderate annular calcification. There is mild regurgitation.    Tricuspid Valve: There is mild to moderate regurgitation. The right ventricular systolic pressure is moderately elevated. The estimated right ventricular systolic pressure is 54.00 mmHg.    IVC/SVC: The right atrial pressure is estimated at 15.0 mmHg. The inferior vena cava is dilated. Respirophasic changes were blunted (less than 50% variation).    Prior TTE study available for comparison. Prior study date: 8/17/2023. Changes noted when compared to prior study. The wall thickness is severely increased.      Anesthesia Plan  ASA Score- 3     Anesthesia Type- IV sedation with anesthesia with ASA Monitors.         Additional Monitors:     Airway Plan:     Comment: No episodes of CP since ED visit. Jardiance, AC appropriately held. Unilateral L leg swelling noted. Pt denies orthopnea..       Plan Factors-Exercise tolerance (METS): <4 METS.    Chart reviewed. EKG reviewed.  Existing labs reviewed. Patient summary reviewed.    Patient is not a current smoker.              Induction- intravenous.    Postoperative Plan- Plan for postoperative opioid use.         Informed Consent- Anesthetic plan and risks discussed with patient.  I personally reviewed this patient with the CRNA. Discussed and agreed on the Anesthesia Plan with the CRNA..      NPO Status:  Vitals Value Taken Time   Date of last liquid 01/27/25 01/27/25 1221   Time of last liquid 0800 01/27/25 1221   Date of last solid 01/26/25 01/27/25 1221   Time of last solid 1800 01/27/25 1221

## 2025-01-27 NOTE — ANESTHESIA POSTPROCEDURE EVALUATION
Post-Op Assessment Note    CV Status:  Stable    Pain management: adequate       Mental Status:  Awake and sleepy   Hydration Status:  Euvolemic   PONV Controlled:  Controlled   Airway Patency:  Patent     Post Op Vitals Reviewed: Yes    No anethesia notable event occurred.    Staff: CRNA           Last Filed PACU Vitals:  Vitals Value Taken Time   Temp 99.1 °F (37.3 °C) 01/27/25 1354   Pulse 82 01/27/25 1354   /61 01/27/25 1354   Resp 14 01/27/25 1354   SpO2 100 % 01/27/25 1354

## 2025-01-28 NOTE — ANESTHESIA POSTPROCEDURE EVALUATION
Post-Op Assessment Note    Last Filed PACU Vitals:  Vitals Value Taken Time   Temp 98 °F (36.7 °C) 01/27/25 1422   Pulse 71 01/27/25 1431   /72 01/27/25 1430   Resp 14 01/27/25 1431   SpO2 94 % 01/27/25 1431   Vitals shown include unfiled device data.    Modified Kenna:     Vitals Value Taken Time   Activity 2 01/27/25 1422   Respiration 2 01/27/25 1422   Circulation 2 01/27/25 1422   Consciousness 2 01/27/25 1422   Oxygen Saturation 2 01/27/25 1422     Modified Kenna Score: 10

## 2025-01-30 ENCOUNTER — RESULTS FOLLOW-UP (OUTPATIENT)
Dept: OBGYN CLINIC | Facility: CLINIC | Age: 85
End: 2025-01-30

## 2025-01-30 PROCEDURE — 88305 TISSUE EXAM BY PATHOLOGIST: CPT | Performed by: PATHOLOGY

## 2025-01-31 NOTE — TELEPHONE ENCOUNTER
Called pt and informed of results pt verbalized understanding.    ----- Message from April H sent at 1/31/2025 10:20 AM EST -----    ----- Message -----  From: Maria A Bates MD  Sent: 1/30/2025  12:37 PM EST  To: [facility]    Please call patient - no abnormal cell, just longstanding lack of estrogen, which is normal at age 84.

## 2025-02-03 ENCOUNTER — RESULTS FOLLOW-UP (OUTPATIENT)
Dept: NON INVASIVE DIAGNOSTICS | Facility: HOSPITAL | Age: 85
End: 2025-02-03

## 2025-02-03 ENCOUNTER — REMOTE DEVICE CLINIC VISIT (OUTPATIENT)
Dept: CARDIOLOGY CLINIC | Facility: CLINIC | Age: 85
End: 2025-02-03
Payer: MEDICARE

## 2025-02-03 DIAGNOSIS — I49.5 SSS (SICK SINUS SYNDROME) (HCC): Primary | ICD-10-CM

## 2025-02-03 PROCEDURE — 93294 REM INTERROG EVL PM/LDLS PM: CPT | Performed by: STUDENT IN AN ORGANIZED HEALTH CARE EDUCATION/TRAINING PROGRAM

## 2025-02-03 PROCEDURE — 93296 REM INTERROG EVL PM/IDS: CPT | Performed by: STUDENT IN AN ORGANIZED HEALTH CARE EDUCATION/TRAINING PROGRAM

## 2025-02-03 NOTE — PROGRESS NOTES
Results for orders placed or performed in visit on 02/03/25   Cardiac EP device report    Narrative    MDT-SINGLE CHAMBER PPM / NOT MRI CONDITIONAL  CARELINK TRANSMISSION: BATTERY VOLTAGE ADEQUATE 98.1 YRS). : 68.9% (>40%~VVIR/60). ALL AVAILABLE LEAD PARAMETERS WITHIN NORMAL LIMITS. NO SIGNIFICANT HIGH RATE EPISODES. NORMAL DEVICE FUNCTION. CH

## 2025-02-10 ENCOUNTER — OFFICE VISIT (OUTPATIENT)
Dept: MULTI SPECIALTY CLINIC | Facility: CLINIC | Age: 85
End: 2025-02-10

## 2025-02-10 VITALS
HEART RATE: 92 BPM | SYSTOLIC BLOOD PRESSURE: 152 MMHG | DIASTOLIC BLOOD PRESSURE: 72 MMHG | TEMPERATURE: 97.9 F | WEIGHT: 145 LBS | BODY MASS INDEX: 25.69 KG/M2

## 2025-02-10 DIAGNOSIS — M21.372 ACQUIRED LEFT FOOT DROP: Primary | ICD-10-CM

## 2025-02-10 DIAGNOSIS — Z12.31 ENCOUNTER FOR SCREENING MAMMOGRAM FOR BREAST CANCER: Primary | ICD-10-CM

## 2025-02-10 DIAGNOSIS — B35.1 ONYCHOMYCOSIS: ICD-10-CM

## 2025-02-10 PROCEDURE — RECHECK: Performed by: PODIATRIST

## 2025-02-10 NOTE — PROGRESS NOTES
[facility]   Ade Moon 84 y.o. female MRN: [id number]  Encounter: [id number]    Assessment & Plan     Assessment:  1. Onychomycosis  2. Left foot drop foot  3. History of CVA with left sided weakness  4. CKD    Plan:  - Patient was seen/examined. All questions and concerns addressed  - Nails x10 were sharply trimmed to normal length and thickness with a large nail nipper without incident.  -Drop foot of the left ankle is semirigid s/p a stroke years ago. She has not done the therapy or gotten her brace that she very much needs. She understands the importance of therapy to help get her ankle back to rectus and the brace for protected weightbearing. She will work on doing these two before her next appointment.  - RTC in 3 months or sooner if necessary.        Nail debridement     Date/Time  2/10/2025 2:15 PM     Performed by  Seferino Kumar DPM   Authorized by  Seferino Kumar DPM     Universal Protocol   Consent: Verbal consent obtained.  Risks and benefits: risks, benefits and alternatives were discussed  Consent given by: patient  Patient understanding: patient states understanding of the procedure being performed  Patient consent: the patient's understanding of the procedure matches consent given  Procedure consent: procedure consent matches procedure scheduled  Patient identity confirmed: verbally with patient     Procedure Details   Procedure Notes: Nails x10 were sharply trimmed to normal length and thickness with a large nail nipper without incident.               History of Present Illness     HPI:  Ade Moon is a 84 y.o. female who presents with elongated toenails and ankle deformity of the left leg. States that their nails are painful, elongated. They have difficulty applying their socks and shoes. The pressure within their shoe gear is painful and they have been unable to cut their nails adequately. Patient denies numbness/tingling. The patient denies any nausea, vomiting,  fever, chills, shortness of breath, or chest pains.    The patient mentions that she has not done the physical therapy or gotten a new brace since she was last seen. She relays that she has been having trouble getting transportation and is attempting to find a way to these appointments. She is ambulating with a plantarflexed foot in sneakers without a brace currently because her previous brace cuts into her skin.      Review of Systems   Constitutional: Negative.    HENT: Negative.    Eyes: Negative.    Respiratory: Negative.    Cardiovascular: Negative.    Gastrointestinal: Negative.    Musculoskeletal: L ankle equinus   Skin: elongated thickened toenails.   Neurological: Negative.        Historical Information   Past Medical History:   Diagnosis Date    A-fib (HCC)     Anemia     Arthritis     BiPAP (biphasic positive airway pressure) dependence     Breast pain, right     Chest pain on breathing     cleared by cardio on 1/10/25    CHF (congestive heart failure) (HCC)     CKD (chronic kidney disease)     Colon polyp     Cough     Diverticulosis     Edema of left lower extremity     Encounter for screening colonoscopy     Frequent falls     H/O sick sinus syndrome     Heart murmur     High cholesterol     History of atrial fibrillation     on Pradaxa    History of CVA (cerebrovascular accident)     Left- weakness    History of weight loss     Report loose fitting clothes. Weight stable (3lbs difference). Last colo showed small tubular adenoma x2. Breast biopsy last showed fibrocystic changes. TSH wnl. Will check cbc, bmp, spep.        Hx of long term use of blood thinners     Hypertension     Irregular heart beat     Left foot drop     Left hemiparesis (HCC)     OA (osteoarthritis)     RICH (obstructive sleep apnea)     Pacemaker     Preop examination     Shortness of breath     Urinary retention     Viral bronchitis     Walker as ambulation aid     Wears glasses      Past Surgical History:   Procedure Laterality  Date    BREAST BIOPSY Right 08/17/2006    ultrasound guided Percutaneous Needle Core -Benign    CATARACT EXTRACTION      CATARACT EXTRACTION W/ INTRAOCULAR LENS  IMPLANT, BILATERAL      COLONOSCOPY      COLONOSCOPY N/A 05/22/2018    Procedure: COLONOSCOPY;  Surgeon: Jenn Jang DO;  Location: [facility];  Service: Gastroenterology    INSERT / REPLACE / REMOVE PACEMAKER      LEG SURGERY Right     as a child after a fall    MAMMO STEREOTACTIC BREAST BIOPSY RIGHT (ALL INC) Right 10/2014    benign    MS CMBND ANTERPOST COLPORRAPHY W/CYSTO N/A 03/17/2016    Procedure: COLPORRHAPHY ANTERIOR POSTERIOR ;  Surgeon: Dario Braden MD;  Location: [facility];  Service: Gynecology    MS COLONOSCOPY FLX DX W/COLLJ SPEC WHEN PFRMD N/A 04/04/2019    Procedure: COLONOSCOPY;  Surgeon: Jenn Jang DO;  Location: [facility];  Service: Gastroenterology    MS COLPOPEXY VAGINAL EXTRAPERITONEAL APPROACH N/A 03/17/2016    Procedure: COLPOPEXY VAGINAL EXTRAPERITONEAL (VEC) ANTERIOR ;  Surgeon: Dario Braden MD;  Location: [facility];  Service: Gynecology    MS CYSTOURETHROSCOPY N/A 03/17/2016    Procedure: CYSTOSCOPY;  Surgeon: Dario Braden MD;  [facility];  Service: Gynecology    MS ESOPHAGOGASTRODUODENOSCOPY TRANSORAL DIAGNOSTIC N/A 04/16/2018    Procedure: EGD AND COLONOSCOPY;  Surgeon: Jenn Jang DO;  Location: [facility];  Service: Gastroenterology    MS HYSTEROSCOPY BX ENDOMETRIUM&/POLYPC W/WO D&C N/A 1/27/2025    Procedure: DILATATION AND CURETTAGE (D&C) WITH HYSTEROSCOPY AND EXAM UNDER ANESTHESIA;  Surgeon: Maria A Bates MD;  Location: [facility];  Service: Gynecology    MS LAPAROSCOPY COLECTOMY PARTIAL W/ANASTOMOSIS Right 01/13/2022    Procedure: Diagnostic laparoscopy, laparscopic right colectomy;  Surgeon: Andriy Guevara MD;  Location: [facility];  Service: Colorectal    MS SLING OPERATION STRESS INCONTINENCE N/A 03/17/2016    Procedure: INSERTION PUBOVAGINAL SLING SINGLE  INCISION ;  Surgeon: Dario Braden MD;  Location: [facility];  Service: Gynecology     Social History   Social History     Substance and Sexual Activity   Alcohol Use Never     Social History     Substance and Sexual Activity   Drug Use No     Social History     Tobacco Use   Smoking Status Never   Smokeless Tobacco Never     Family History:   Family History   Problem Relation Age of Onset    Diabetes Mother     Breast cancer Sister 53    No Known Problems Father     No Known Problems Maternal Grandmother     No Known Problems Maternal Grandfather     No Known Problems Paternal Grandmother     No Known Problems Paternal Grandfather     No Known Problems Daughter     No Known Problems Son     No Known Problems Sister     No Known Problems Sister     No Known Problems Brother     No Known Problems Brother     No Known Problems Sister     No Known Problems Paternal Aunt     No Known Problems Paternal Aunt     No Known Problems Paternal Aunt     No Known Problems Paternal Aunt        Meds/Allergies   Not in a hospital admission.  Allergies   Allergen Reactions    Other Itching     Bandaids    Tape [Medical Tape] Itching       Objective     Current Vitals:            There were no vitals taken for this visit.    General Appearance: Alert, cooperative, no distress.  HEENT: Head normocephalic, atraumatic, without obvious abnormality.  Heart: Normal rate and rhythm.  No murmurs or gallops noted.  Lungs: Non-labored breathing. No respiratory distress.  No wheezing, rhonchi, or rales.  Abdomen:  Soft, nontender, without distension.  Psychiatric: AAOx3    Lower Extremity Exam:    Vascular:   Right Pt pulse is present  Right DP pulse is present  Left PT pulse is present  Left DP pulse is present.   CRT < 3 seconds at the digits.   1/4 edema noted at bilateral lower extremities.   Pedal hair is absent. Skin temperature is WNL bilaterally.    Musculoskeletal:  Left foot dropfoot with semirigid equinus and digits  semirigidly plantarflexed in claw-toe formation  ROM at the 1st MPJ and ankle joint are reduced bilaterally with the leg extended.   No gross deformities noted.     Dermatological:  Nails x 10 are elongated, painful, discolored, dystrophic, with subungual debris.  No open wounds at this time    Neurological:  Gross sensation is intact.   Protective sensation is diminished.   Patient Denies numbness and/or paresthesias.

## 2025-02-12 ENCOUNTER — OFFICE VISIT (OUTPATIENT)
Dept: SLEEP CENTER | Facility: CLINIC | Age: 85
End: 2025-02-12
Payer: MEDICARE

## 2025-02-12 VITALS
WEIGHT: 144 LBS | BODY MASS INDEX: 25.52 KG/M2 | SYSTOLIC BLOOD PRESSURE: 140 MMHG | DIASTOLIC BLOOD PRESSURE: 80 MMHG | HEIGHT: 63 IN

## 2025-02-12 DIAGNOSIS — G47.09 OTHER INSOMNIA: ICD-10-CM

## 2025-02-12 DIAGNOSIS — Z91.199 POOR COMPLIANCE WITH CPAP TREATMENT: ICD-10-CM

## 2025-02-12 DIAGNOSIS — I48.20 CHRONIC ATRIAL FIBRILLATION (HCC): ICD-10-CM

## 2025-02-12 DIAGNOSIS — G47.39 COMPLEX SLEEP APNEA SYNDROME: ICD-10-CM

## 2025-02-12 DIAGNOSIS — I27.20 PULMONARY HYPERTENSION (HCC): ICD-10-CM

## 2025-02-12 DIAGNOSIS — G47.33 OSA (OBSTRUCTIVE SLEEP APNEA): Primary | ICD-10-CM

## 2025-02-12 DIAGNOSIS — I63.30 CEREBRAL THROMBOSIS WITH CEREBRAL INFARCTION (HCC): ICD-10-CM

## 2025-02-12 PROCEDURE — 99214 OFFICE O/P EST MOD 30 MIN: CPT | Performed by: INTERNAL MEDICINE

## 2025-02-12 PROCEDURE — G2211 COMPLEX E/M VISIT ADD ON: HCPCS | Performed by: INTERNAL MEDICINE

## 2025-02-12 NOTE — PROGRESS NOTES
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  Stable/controlled conditions reviewed in notes.  I reviewed [results of prior studies with the patient.   I discussed treatment options with risks and benefits.  She understands risks of untreated sleep disordered breathing.  She understands her options are limited and treatment with  PAP is medically necessary and Ade is agreable to continue use.   Care of equipment, methods to improve comfort using PAP and importance of compliance with therapy were discussed.  Pressure setting: continue ASV at current pressure settings.    Rx provided to replace supplies and Care coordinated with DME provider to check the machine and to provide her with instructions on use.   Multi component Cognitive behavioral therapy for Insomnia undertaken - Sleep Restriction, Stimulus control, Relaxation techniques and Sleep hygiene were discussed.  Discussed strategies for weight reduction.    Follow-up is advised in 2 months (with Dr Perez) to monitor progress, compliance and to adjust therapy.      History of Present Illness   HPI         Follow-Up Note - Sleep Center   Ade Moon  84 y.o. female  :1940  MRN:[id number]  DOS:2025    CC: I saw this patient for follow-up in clinic today for sleep disordered breathing, Coexisting Sleep and Medical Problems. Interval changes: Patient recently had a diagnostic sleep study and is here to review results /  "treatment options.  She current ASV machine but has not been using it \"because it is making a noise \".  She has tried to contact DME company but has had no success.  She is also complaining that she was not given proper instruction on use of her device.    The study demonstrated an apnea/hypopnea index (AHI) of 40.1 events per hour of sleep.  The AHI in the supine position was 40.1.  The AHI during REM sleep was 50.0.  The amount of sleep time below 90% was 57.3 minutes.  The lowest oxygen saturation was 68.0%.    January 2024 CPAP study was ineffective.  Bilevel PAP was used up to 16/12 but no pressure could be recommended as central sleep apneas persisted.     2/5/2024 ASV study showed efficacy with ASV at EPAP range 8-15, pressure support 3-15.  Fullface mask recommended.       PFSH, Problem List, Medications & Allergies were reviewed in EMR.   She  has a past medical history of A-fib (HCC), Anemia, Arthritis, BiPAP (biphasic positive airway pressure) dependence, Breast pain, right, Chest pain on breathing, CHF (congestive heart failure) (HCC), CKD (chronic kidney disease), Colon polyp, Cough, Diverticulosis, Edema of left lower extremity, Encounter for screening colonoscopy, Frequent falls, H/O sick sinus syndrome, Heart murmur, High cholesterol, History of atrial fibrillation, History of CVA (cerebrovascular accident), History of weight loss, long term use of blood thinners, Hypertension, Irregular heart beat, Left foot drop, Left hemiparesis (HCC), OA (osteoarthritis), RICH (obstructive sleep apnea), Pacemaker, Preop examination, Shortness of breath, Urinary retention, Viral bronchitis, Walker as ambulation aid, and Wears glasses.    She has a current medication list which includes the following prescription(s): acetaminophen, acetaminophen, acetaminophen, ammonium lactate, blood pressure cuff, cyanocobalamin, gabapentin, ra adult wipes, isosorbide mononitrate, jardiance, losartan, nebivolol, pradaxa, " "rosuvastatin, spironolactone, verapamil, misoprostol, and [DISCONTINUED] apixaban, and the following Facility-Administered Medications: cyanocobalamin.    HPI: She sleeps alone and is not aware of snoring or breathing difficulties during sleep  Sleep Routine: Ade reports getting 7 hrs sleep out of approximately 8 hours in bed; she has difficulty initiating sleep, but not maintaining sleep . She arises spontaneously and is not always refreshed.Ade denies excessive daytime sleepiness,.  She rated herself at Total score: 2 /24 on the [place] Sleepiness Scale.   Habits: Reports that she has never smoked. She has never used smokeless tobacco.,  Reports no history of alcohol use.,  Reports no history of drug use., Caffeine use: limited, Exercise routine: sometimes.     ROS: reviewed significant for weight is stable within a few pounds..        EXAM: /80   Ht 5' 3\" (1.6 m)   Wt 65.3 kg (144 lb)   BMI 25.51 kg/m²     Wt Readings from Last 3 Encounters:   02/12/25 65.3 kg (144 lb)   02/10/25 65.8 kg (145 lb)   01/27/25 63.9 kg (140 lb 14.4 oz)     Patient is well groomed; well appearing.  CNS: Alert, orientated, clear & coherent speech.  Psych: cooperative and in no distress. Mental state: Appears normal.  H&N: EOMI; NC/AT: No facial pressure marks, no rashes.    Skin/Extrem: col & hydration normal; no edema  Resp: Respiratory effort is normal  Physical findings otherwise essentially unchanged from previous.    Sincerely,     Authenticated electronically on 02/12/25   Board Certified Specialist     Portions of the record may have been created with voice recognition software. Occasional wrong word or \"sound a like\" substitutions may have occurred due to the inherent limitations of voice recognition software. There may also be notations and random deletions of words or characters from malfunctioning software. Read the chart carefully and recognize, using context, where substitutions/deletions have " "occurred.    Sitting and reading: Moderate chance of dozing  Watching TV: Would never doze  Sitting, inactive in a public place (e.g. a theatre or a meeting): Would never doze  As a passenger in a car for an hour without a break: Would never doze  Lying down to rest in the afternoon when circumstances permit: Would never doze  Sitting and talking to someone: Would never doze  Sitting quietly after a lunch without alcohol: Would never doze  In a car, while stopped for a few minutes in traffic: Would never doze  Total score: 2     Review of Systems  Pertinent positives/negatives included in HPI and also as noted:       Objective   /80   Ht 5' 3\" (1.6 m)   Wt 65.3 kg (144 lb)   BMI 25.51 kg/m²        Physical Exam    Data  Lab Results   Component Value Date    HGB 13.2 12/31/2024    HCT 41.3 12/31/2024    MCV 97 12/31/2024      Lab Results   Component Value Date    GLUCOSE 88 11/25/2015    CALCIUM 9.3 12/31/2024     11/25/2015    K 4.0 12/31/2024    CO2 21 12/31/2024     12/31/2024    BUN 24 12/31/2024    CREATININE 1.07 12/31/2024     Lab Results   Component Value Date    IRON 80 06/15/2024    TIBC 321 06/15/2024    FERRITIN 19 06/15/2024     Lab Results   Component Value Date    AST 17 12/31/2024    ALT 7 12/31/2024         "

## 2025-02-12 NOTE — ASSESSMENT & PLAN NOTE
PLAN:   Problems & Comorbidities Addressed this Visit as listed.  Stable/controlled conditions reviewed in notes.  I reviewed [results of prior studies with the patient.   I discussed treatment options with risks and benefits.  She understands risks of untreated sleep disordered breathing.  She understands her options are limited and treatment with  PAP is medically necessary and Ade is agreable to continue use.   Care of equipment, methods to improve comfort using PAP and importance of compliance with therapy were discussed.  Pressure setting: continue ASV at current pressure settings.    Rx provided to replace supplies and Care coordinated with DME provider to check the machine and to provide her with instructions on use.   Multi component Cognitive behavioral therapy for Insomnia undertaken - Sleep Restriction, Stimulus control, Relaxation techniques and Sleep hygiene were discussed.  Discussed strategies for weight reduction.    Follow-up is advised in 2 months (with Dr Perez) to monitor progress, compliance and to adjust therapy.

## 2025-02-13 ENCOUNTER — CLINICAL SUPPORT (OUTPATIENT)
Dept: INTERNAL MEDICINE CLINIC | Facility: CLINIC | Age: 85
End: 2025-02-13

## 2025-02-13 DIAGNOSIS — E53.8 B12 DEFICIENCY: Primary | Chronic | ICD-10-CM

## 2025-02-13 RX ADMIN — CYANOCOBALAMIN 1000 MCG: 1000 INJECTION, SOLUTION INTRAMUSCULAR; SUBCUTANEOUS at 10:49

## 2025-02-13 NOTE — PROGRESS NOTES
Post Op Visit  2025    Assessment & Plan   84 y.o. s/p D&C hysteroscopy for postmenopausal bleeding, doing well.    Problem List Items Addressed This Visit       Vaginal bleeding    Likely due to atrophy and chronic anticoagulation  Counseled on risk of recurrent bleeding and option for vaginal estrogen  Will hold off on initiating unless she develops frequent or bothersome bleeding in the future         Status post dilation and curettage - Primary    Doing well post op  Reviewed pathology results, consistent with atrophic endometrium, no evidence of malignancy    Return to office as needed            ----------------------------------------------    History of Present Illness     Ade Moon is a 84 y.o. female  who presents as a postoperative appointment.  She underwent D&C hysteroscopy for postmenopausal bleeding on 25 which was uncomplicated. She had some increased vaginal bleeding immediately after the procedure but is not currently having any. She denies fevers/chills, pain and has been having normal bowel and bladder function.       REVIEW OF SYSTEMS  12 point review of systems negative aside from HPI.    Past Medical History:   Diagnosis Date    A-fib (HCC)     Anemia     Arthritis     BiPAP (biphasic positive airway pressure) dependence     Breast pain, right     Chest pain on breathing     cleared by cardio on 1/10/25    CHF (congestive heart failure) (HCC)     CKD (chronic kidney disease)     Colon polyp     Cough     Diverticulosis     Edema of left lower extremity     Encounter for screening colonoscopy     Frequent falls     H/O sick sinus syndrome     Heart murmur     High cholesterol     History of atrial fibrillation     on Pradaxa    History of CVA (cerebrovascular accident)     Left- weakness    History of weight loss     Report loose fitting clothes. Weight stable (3lbs difference). Last colo showed small tubular adenoma x2. Breast biopsy last showed fibrocystic changes.  TSH wnl. Will check cbc, bmp, spep.        Hx of long term use of blood thinners     Hypertension     Irregular heart beat     Left foot drop     Left hemiparesis (HCC)     OA (osteoarthritis)     RICH (obstructive sleep apnea)     Pacemaker     Preop examination     Shortness of breath     Urinary retention     Viral bronchitis     Walker as ambulation aid     Wears glasses      Past Surgical History:   Procedure Laterality Date    BREAST BIOPSY Right 08/17/2006    ultrasound guided Percutaneous Needle Core -Benign    CATARACT EXTRACTION      CATARACT EXTRACTION W/ INTRAOCULAR LENS  IMPLANT, BILATERAL      COLONOSCOPY      COLONOSCOPY N/A 05/22/2018    Procedure: COLONOSCOPY;  Surgeon: Jenn Jang DO;  Location: [facility];  Service: Gastroenterology    INSERT / REPLACE / REMOVE PACEMAKER      LEG SURGERY Right     as a child after a fall    MAMMO STEREOTACTIC BREAST BIOPSY RIGHT (ALL INC) Right 10/2014    benign    MI CMBND ANTERPOST COLPORRAPHY W/CYSTO N/A 03/17/2016    Procedure: COLPORRHAPHY ANTERIOR POSTERIOR ;  Surgeon: Dario Braden MD;  Location: [facility];  Service: Gynecology    MI COLONOSCOPY FLX DX W/COLLJ SPEC WHEN PFRMD N/A 04/04/2019    Procedure: COLONOSCOPY;  Surgeon: Jenn Jang DO;  Location: [facility];  Service: Gastroenterology    MI COLPOPEXY VAGINAL EXTRAPERITONEAL APPROACH N/A 03/17/2016    Procedure: COLPOPEXY VAGINAL EXTRAPERITONEAL (VEC) ANTERIOR ;  Surgeon: Dario Braden MD;  Location[facility];  Service: Gynecology    MI CYSTOURETHROSCOPY N/A 03/17/2016    Procedure: CYSTOSCOPY;  Surgeon: Dario Braden MD;  [facility];  Service: Gynecology    MI ESOPHAGOGASTRODUODENOSCOPY TRANSORAL DIAGNOSTIC N/A 04/16/2018    Procedure: EGD AND COLONOSCOPY;  Surgeon: Jenn Jang DO;  Location: [facility];  Service: Gastroenterology    MI HYSTEROSCOPY BX ENDOMETRIUM&/POLYPC W/WO D&C N/A 1/27/2025    Procedure: DILATATION AND CURETTAGE (D&C) WITH  HYSTEROSCOPY AND EXAM UNDER ANESTHESIA;  Surgeon: Maria A Bates MD;  Location: [facility];  Service: Gynecology    DE LAPAROSCOPY COLECTOMY PARTIAL W/ANASTOMOSIS Right 01/13/2022    Procedure: Diagnostic laparoscopy, laparscopic right colectomy;  Surgeon: Andriy Guevara MD;  Location: [facility];  Service: Colorectal    DE SLING OPERATION STRESS INCONTINENCE N/A 03/17/2016    Procedure: INSERTION PUBOVAGINAL SLING SINGLE INCISION ;  Surgeon: Dario Braden MD;  Location[facility];  Service: Gynecology       Current Outpatient Medications:     acetaminophen (TYLENOL) 500 mg tablet, Take 500 mg by mouth every 6 (six) hours as needed for mild pain, Disp: , Rfl:     cyanocobalamin 1,000 mcg/mL, INJECT 1 ML (1,000 MCG TOTAL) INTO A MUSCLE EVERY 30 (THIRTY) DAYS, Disp: 3 mL, Rfl: 3    gabapentin (NEURONTIN) 100 mg capsule, TAKE 1 CAPSULE (100 MG TOTAL) BY MOUTH 3 (THREE) TIMES A DAY AS NEEDED (AS NEEDED FOR LEFT FOOT PAIN), Disp: 90 capsule, Rfl: 3    Incontinence Supply Disposable (RA Adult Wipes) MISC, Use 4 (four) times a day, Disp: 64 each, Rfl: 10    isosorbide mononitrate (IMDUR) 30 mg 24 hr tablet, Take 1 tablet (30 mg total) by mouth daily, Disp: 30 tablet, Rfl: 17    Jardiance 10 MG TABS tablet, TAKE 1 TABLET (10 MG TOTAL) BY MOUTH EVERY MORNING, Disp: 90 tablet, Rfl: 5    losartan (COZAAR) 50 mg tablet, Take 1 tablet (50 mg total) by mouth 2 (two) times a day, Disp: 180 tablet, Rfl: 5    nebivolol (BYSTOLIC) 5 mg tablet, TAKE 1 TABLET (5 MG TOTAL) BY MOUTH DAILY, Disp: 90 tablet, Rfl: 3    Pradaxa 150 MG capsu, SB JUAN ANTONIO CAPSULA POR VIA ORAL DOS VECES AL KATRINA, Disp: 180 capsule, Rfl: 3    rosuvastatin (CRESTOR) 5 mg tablet, Take 5 mg by mouth daily, Disp: , Rfl:     spironolactone (ALDACTONE) 25 mg tablet, TAKE 1 TABLET (25 MG TOTAL) BY MOUTH DAILY, Disp: 90 tablet, Rfl: 1    verapamil (CALAN-SR) 240 mg CR tablet, TAKE 1 TABLET (240 MG TOTAL) BY MOUTH DAILY AT BEDTIME, Disp: 90 tablet, Rfl: 3     "acetaminophen (TYLENOL) 325 mg tablet, Take 2 tablets (650 mg total) by mouth every 6 (six) hours as needed for mild pain (Patient not taking: Reported on 2/14/2025), Disp: , Rfl: 0    acetaminophen (TYLENOL) 325 mg tablet, Take 2 tablets (650 mg total) by mouth every 6 (six) hours as needed for mild pain (Patient not taking: Reported on 2/14/2025), Disp: 30 tablet, Rfl: 0    ammonium lactate (LAC-HYDRIN) 12 % cream, APPLY TOPICALLY AS NEEDED FOR DRY SKIN (Patient not taking: Reported on 2/14/2025), Disp: 385 g, Rfl: 3    Blood Pressure Monitoring (Blood Pressure Cuff) MISC, Use every other day For HTN (Patient not taking: Reported on 2/14/2025), Disp: 1 each, Rfl: 0    miSOPROStol (Cytotec) 200 mcg tablet, Take 2 tablets (400 mcg total) by mouth 1 (one) time for 1 dose Please take medication 4 hours before your surgery with water Do not start before January 26, 2025., Disp: 2 tablet, Rfl: 0    Current Facility-Administered Medications:     cyanocobalamin injection 1,000 mcg, 1,000 mcg, Intramuscular, Q30 Days, Deepak Hermosillo MD, 1,000 mcg at 02/13/25 1049  Allergies   Allergen Reactions    Other Itching     Bandaids    Tape [Medical Tape] Itching       Objective   Vitals: /72 (BP Location: Right arm, Patient Position: Sitting, Cuff Size: Large)   Pulse 70   Resp 18   Ht 5' 3\" (1.6 m)   Wt 60.7 kg (133 lb 12.8 oz)   BMI 23.70 kg/m²  Body mass index is 23.7 kg/m².    Physical Exam  GEN: The patient was alert and oriented x3, pleasant well-appearing female in no acute distress.   CV: Regular rate  PULM: Non-labored respirations  MSK: Normal gait  Skin: Warm, dry  Neuro: No focal deficits  Psych: Normal affect and judgement, cooperative      Lab Results:   Lab Results   Component Value Date    WBC 8.31 12/31/2024    HGB 13.2 12/31/2024    HCT 41.3 12/31/2024    MCV 97 12/31/2024     12/31/2024        Svetlana Gallego MD   OB/Gyn PGY-4  10:24 AM  02/14/25    "

## 2025-02-13 NOTE — PROGRESS NOTES
Patient came into the office for nurse visit for Vit B12 injection. Patient supplied the serum and 1.0 ml was given in right deltoid.

## 2025-02-14 ENCOUNTER — OFFICE VISIT (OUTPATIENT)
Dept: OBGYN CLINIC | Facility: CLINIC | Age: 85
End: 2025-02-14

## 2025-02-14 VITALS
SYSTOLIC BLOOD PRESSURE: 150 MMHG | HEIGHT: 63 IN | DIASTOLIC BLOOD PRESSURE: 72 MMHG | BODY MASS INDEX: 23.71 KG/M2 | WEIGHT: 133.8 LBS | RESPIRATION RATE: 18 BRPM | HEART RATE: 70 BPM

## 2025-02-14 DIAGNOSIS — N93.9 VAGINAL BLEEDING: ICD-10-CM

## 2025-02-14 DIAGNOSIS — Z98.890 STATUS POST DILATION AND CURETTAGE: Primary | ICD-10-CM

## 2025-02-14 PROCEDURE — 3077F SYST BP >= 140 MM HG: CPT | Performed by: OBSTETRICS & GYNECOLOGY

## 2025-02-14 PROCEDURE — 3078F DIAST BP <80 MM HG: CPT | Performed by: OBSTETRICS & GYNECOLOGY

## 2025-02-14 PROCEDURE — 99213 OFFICE O/P EST LOW 20 MIN: CPT | Performed by: OBSTETRICS & GYNECOLOGY

## 2025-02-14 NOTE — ASSESSMENT & PLAN NOTE
Likely due to atrophy and chronic anticoagulation  Counseled on risk of recurrent bleeding and option for vaginal estrogen  Will hold off on initiating unless she develops frequent or bothersome bleeding in the future

## 2025-02-14 NOTE — ASSESSMENT & PLAN NOTE
Doing well post op  Reviewed pathology results, consistent with atrophic endometrium, no evidence of malignancy    Return to office as needed

## 2025-02-26 ENCOUNTER — TELEPHONE (OUTPATIENT)
Dept: INTERNAL MEDICINE CLINIC | Facility: CLINIC | Age: 85
End: 2025-02-26

## 2025-02-26 NOTE — TELEPHONE ENCOUNTER
Prior authorization initiated for patient's Pradaxa 150 mg capsules on Cover my Meds. Pending review.     Key: [id number]

## 2025-02-28 NOTE — TELEPHONE ENCOUNTER
Contacted patient's [facility] insurance at . Spoke with Natalee. Per Natalee, Generic Pradaxa is covered and does not require a prior authorization. Drug within formulary limits.     Contacted patient's [facility] at . Spoke with pharmacist. Introduced self and role. Pharmacist updated nurse spoke with insurance. Generic Pradaxa is covered under her plan. Pharmacist ran generic Pradaxa and approved.     Per Pharmacist, patient needs a new script written and sent to them for Generic Pradaxa with a RAMIRO 0 noted.     Please review.

## 2025-03-02 ENCOUNTER — APPOINTMENT (EMERGENCY)
Dept: RADIOLOGY | Facility: HOSPITAL | Age: 85
End: 2025-03-02
Payer: MEDICARE

## 2025-03-02 ENCOUNTER — HOSPITAL ENCOUNTER (EMERGENCY)
Facility: HOSPITAL | Age: 85
Discharge: HOME | End: 2025-03-03
Attending: EMERGENCY MEDICINE | Admitting: EMERGENCY MEDICINE
Payer: MEDICARE

## 2025-03-02 DIAGNOSIS — E83.42 HYPOMAGNESEMIA: Primary | ICD-10-CM

## 2025-03-02 DIAGNOSIS — R11.2 NAUSEA & VOMITING: ICD-10-CM

## 2025-03-02 LAB
ALBUMIN SERPL BCG-MCNC: 4.2 G/DL (ref 3.5–5)
ALP SERPL-CCNC: 79 U/L (ref 34–104)
ALT SERPL W P-5'-P-CCNC: 9 U/L (ref 7–52)
ANION GAP SERPL CALCULATED.3IONS-SCNC: 12 MMOL/L (ref 4–13)
AST SERPL W P-5'-P-CCNC: 16 U/L (ref 13–39)
BILIRUB SERPL-MCNC: 0.6 MG/DL (ref 0.2–1)
BUN SERPL-MCNC: 17 MG/DL (ref 5–25)
CALCIUM SERPL-MCNC: 10 MG/DL (ref 8.4–10.2)
CARDIAC TROPONIN I PNL SERPL HS: 7 NG/L (ref ?–50)
CHLORIDE SERPL-SCNC: 102 MMOL/L (ref 96–108)
CO2 SERPL-SCNC: 24 MMOL/L (ref 21–32)
CREAT SERPL-MCNC: 1.14 MG/DL (ref 0.6–1.3)
GFR SERPL CREATININE-BSD FRML MDRD: 44 ML/MIN/1.73SQ M
GLUCOSE SERPL-MCNC: 141 MG/DL (ref 65–140)
LIPASE SERPL-CCNC: 22 U/L (ref 11–82)
MAGNESIUM SERPL-MCNC: 1.5 MG/DL (ref 1.9–2.7)
PHOSPHATE SERPL-MCNC: 3.7 MG/DL (ref 2.3–4.1)
POTASSIUM SERPL-SCNC: 3.5 MMOL/L (ref 3.5–5.3)
PROT SERPL-MCNC: 8.3 G/DL (ref 6.4–8.4)
SODIUM SERPL-SCNC: 138 MMOL/L (ref 135–147)

## 2025-03-02 PROCEDURE — 74177 CT ABD & PELVIS W/CONTRAST: CPT

## 2025-03-02 PROCEDURE — 80053 COMPREHEN METABOLIC PANEL: CPT

## 2025-03-02 PROCEDURE — 83690 ASSAY OF LIPASE: CPT

## 2025-03-02 PROCEDURE — 99285 EMERGENCY DEPT VISIT HI MDM: CPT | Performed by: EMERGENCY MEDICINE

## 2025-03-02 PROCEDURE — 99284 EMERGENCY DEPT VISIT MOD MDM: CPT

## 2025-03-02 PROCEDURE — 84100 ASSAY OF PHOSPHORUS: CPT

## 2025-03-02 PROCEDURE — 36415 COLL VENOUS BLD VENIPUNCTURE: CPT

## 2025-03-02 PROCEDURE — 93005 ELECTROCARDIOGRAM TRACING: CPT

## 2025-03-02 PROCEDURE — 85027 COMPLETE CBC AUTOMATED: CPT

## 2025-03-02 PROCEDURE — 96361 HYDRATE IV INFUSION ADD-ON: CPT

## 2025-03-02 PROCEDURE — 84484 ASSAY OF TROPONIN QUANT: CPT

## 2025-03-02 PROCEDURE — 83735 ASSAY OF MAGNESIUM: CPT

## 2025-03-02 PROCEDURE — 96375 TX/PRO/DX INJ NEW DRUG ADDON: CPT

## 2025-03-02 PROCEDURE — 85007 BL SMEAR W/DIFF WBC COUNT: CPT

## 2025-03-02 RX ORDER — ONDANSETRON 2 MG/ML
4 INJECTION INTRAMUSCULAR; INTRAVENOUS ONCE
Status: COMPLETED | OUTPATIENT
Start: 2025-03-02 | End: 2025-03-02

## 2025-03-02 RX ORDER — MAGNESIUM SULFATE HEPTAHYDRATE 40 MG/ML
2 INJECTION, SOLUTION INTRAVENOUS ONCE
Status: COMPLETED | OUTPATIENT
Start: 2025-03-03 | End: 2025-03-03

## 2025-03-02 RX ADMIN — SODIUM CHLORIDE 1000 ML: 0.9 INJECTION, SOLUTION INTRAVENOUS at 22:51

## 2025-03-02 RX ADMIN — IOHEXOL 100 ML: 350 INJECTION, SOLUTION INTRAVENOUS at 23:57

## 2025-03-02 RX ADMIN — ONDANSETRON 4 MG: 2 INJECTION, SOLUTION INTRAMUSCULAR; INTRAVENOUS at 23:31

## 2025-03-03 VITALS
OXYGEN SATURATION: 95 % | DIASTOLIC BLOOD PRESSURE: 63 MMHG | TEMPERATURE: 97.2 F | HEART RATE: 100 BPM | RESPIRATION RATE: 16 BRPM | SYSTOLIC BLOOD PRESSURE: 139 MMHG

## 2025-03-03 DIAGNOSIS — I48.20 CHRONIC ATRIAL FIBRILLATION (HCC): Primary | ICD-10-CM

## 2025-03-03 LAB
2HR DELTA HS TROPONIN: -1 NG/L
BASOPHILS # BLD MANUAL: 0 THOUSAND/UL (ref 0–0.1)
BASOPHILS NFR MAR MANUAL: 0 % (ref 0–1)
CARDIAC TROPONIN I PNL SERPL HS: 6 NG/L (ref ?–50)
EOSINOPHIL # BLD MANUAL: 0 THOUSAND/UL (ref 0–0.4)
EOSINOPHIL NFR BLD MANUAL: 0 % (ref 0–6)
ERYTHROCYTE [DISTWIDTH] IN BLOOD BY AUTOMATED COUNT: 13.8 % (ref 11.6–15.1)
HCT VFR BLD AUTO: 45.2 % (ref 34.8–46.1)
HGB BLD-MCNC: 14.4 G/DL (ref 11.5–15.4)
LYMPHOCYTES # BLD AUTO: 0.55 THOUSAND/UL (ref 0.6–4.47)
LYMPHOCYTES # BLD AUTO: 3 % (ref 14–44)
MCH RBC QN AUTO: 30.7 PG (ref 26.8–34.3)
MCHC RBC AUTO-ENTMCNC: 31.9 G/DL (ref 31.4–37.4)
MCV RBC AUTO: 96 FL (ref 82–98)
MONOCYTES # BLD AUTO: 0 THOUSAND/UL (ref 0–1.22)
MONOCYTES NFR BLD: 0 % (ref 4–12)
NEUTROPHILS # BLD MANUAL: 17.72 THOUSAND/UL (ref 1.85–7.62)
NEUTS BAND NFR BLD MANUAL: 6 % (ref 0–8)
NEUTS SEG NFR BLD AUTO: 91 % (ref 43–75)
PLATELET # BLD AUTO: 243 THOUSANDS/UL (ref 149–390)
PLATELET BLD QL SMEAR: ADEQUATE
PMV BLD AUTO: 10.5 FL (ref 8.9–12.7)
POLYCHROMASIA BLD QL SMEAR: PRESENT
RBC # BLD AUTO: 4.69 MILLION/UL (ref 3.81–5.12)
RBC MORPH BLD: NORMAL
WBC # BLD AUTO: 18.27 THOUSAND/UL (ref 4.31–10.16)

## 2025-03-03 PROCEDURE — 96365 THER/PROPH/DIAG IV INF INIT: CPT

## 2025-03-03 PROCEDURE — 84484 ASSAY OF TROPONIN QUANT: CPT

## 2025-03-03 PROCEDURE — 96376 TX/PRO/DX INJ SAME DRUG ADON: CPT

## 2025-03-03 PROCEDURE — 36415 COLL VENOUS BLD VENIPUNCTURE: CPT

## 2025-03-03 PROCEDURE — 96361 HYDRATE IV INFUSION ADD-ON: CPT

## 2025-03-03 RX ORDER — FAMOTIDINE 20 MG/1
20 TABLET, FILM COATED ORAL ONCE
Status: COMPLETED | OUTPATIENT
Start: 2025-03-03 | End: 2025-03-03

## 2025-03-03 RX ORDER — ONDANSETRON 4 MG/1
4 TABLET, ORALLY DISINTEGRATING ORAL EVERY 6 HOURS PRN
Qty: 30 TABLET | Refills: 0 | Status: SHIPPED | OUTPATIENT
Start: 2025-03-03

## 2025-03-03 RX ORDER — ONDANSETRON 2 MG/ML
4 INJECTION INTRAMUSCULAR; INTRAVENOUS ONCE
Status: COMPLETED | OUTPATIENT
Start: 2025-03-03 | End: 2025-03-03

## 2025-03-03 RX ORDER — DABIGATRAN ETEXILATE 75 MG/1
75 CAPSULE ORAL 2 TIMES DAILY
Qty: 180 CAPSULE | Refills: 3 | Status: SHIPPED | OUTPATIENT
Start: 2025-03-03

## 2025-03-03 RX ORDER — DABIGATRAN ETEXILATE 75 MG/1
75 CAPSULE ORAL 2 TIMES DAILY
Qty: 180 CAPSULE | Refills: 3 | Status: SHIPPED | OUTPATIENT
Start: 2025-03-03 | End: 2025-03-03

## 2025-03-03 RX ORDER — MAGNESIUM HYDROXIDE/ALUMINUM HYDROXICE/SIMETHICONE 120; 1200; 1200 MG/30ML; MG/30ML; MG/30ML
30 SUSPENSION ORAL ONCE
Status: COMPLETED | OUTPATIENT
Start: 2025-03-03 | End: 2025-03-03

## 2025-03-03 RX ADMIN — ONDANSETRON 4 MG: 2 INJECTION INTRAMUSCULAR; INTRAVENOUS at 01:25

## 2025-03-03 RX ADMIN — ALUMINUM HYDROXIDE, MAGNESIUM HYDROXIDE, AND SIMETHICONE 30 ML: 200; 200; 20 SUSPENSION ORAL at 05:35

## 2025-03-03 RX ADMIN — MAGNESIUM SULFATE HEPTAHYDRATE 2 G: 40 INJECTION, SOLUTION INTRAVENOUS at 00:04

## 2025-03-03 RX ADMIN — FAMOTIDINE 20 MG: 20 TABLET, FILM COATED ORAL at 05:35

## 2025-03-03 NOTE — ED PROVIDER NOTES
Time reflects when diagnosis was documented in both MDM as applicable and the Disposition within this note       Time User Action Codes Description Comment    3/3/2025 12:03 AM Juanita Robb Add [E83.42] Hypomagnesemia     3/3/2025 12:03 AM Juanita Robb Add [R11.2] Nausea & vomiting           ED Disposition       ED Disposition   Discharge    Condition   Stable    Date/Time   Mon Mar 3, 2025  4:04 AM    Comment   Ade Moon discharge to home/self care.                   Assessment & Plan       Medical Decision Making  Amount and/or Complexity of Data Reviewed  Labs: ordered. Decision-making details documented in ED Course.  Radiology: ordered.    Risk  OTC drugs.  Prescription drug management.      Patient is a 84 y.o. female with PMH of A-fib, pacemaker, CKD who presents to the ED with vomiting, abdominal discomfort.    Vital signs stable. On exam minimal epigastric tenderness palpation without rebound or guarding, patient otherwise well-appearing.    History and physical exam most consistent with gastritis. However, differential diagnosis included but not limited to pancreatitis, cholecystitis, electrolyte abnormality.  Doubt obstruction, ACS, pneumonia.     Plan: CBC, CMP, mag, Phos, troponin, EKG, CT abdomen pelvis, Zofran, IV fluids    View ED course for further discussion on patient workup.     On review of previous records reviewed OB/GYN visit note from 2/14/2025, patient evaluated for postoperative visit after D&C, hysteroscopy for postmenopausal bleeding.    All labs reviewed and utilized in the medical decision making process  All radiology studies independently viewed by me and interpreted by the radiologist.  I reviewed all testing with the patient.     Upon re-evaluation patient resting comfortably no acute distress, tolerated p.o.    Disposition: I have reviewed the patient's vital signs, nursing notes, and other relevant tests/information. I had a detailed discussion with the patient  "regarding the history, exam findings, and any diagnostic results.   Plan to discharge home in stable condition with Zofran, follow up with primary care provider.  Discussed with patient who is agreeable to plan.  I discussed discharge instructions, need for follow-up, and oral return precautions for what to return for in addition to the written return precautions and discharge instructions, specifically highlighting areas of special concern.  The patient verbalized understanding of the discharge instructions and warnings that would necessitate return to the Emergency Department including abdominal pain, vomiting.  All questions the patient had were answered prior to discharge to the best of my ability.       Portions of the record may have been created with voice recognition software. Occasional wrong word or \"sound a like\" substitutions may have occurred due to the inherent limitations of voice recognition software. Read the chart carefully and recognize, using context, where substitutions have occurred.    ED Course as of 03/03/25 0624   Sun Mar 02, 2025   2355 LIPASE: 22  Low concern for acute pancreatitis   2355 hs TnI 0hr: 7  Will check 2-hour delta troponin   2356 MAGNESIUM(!): 1.5  Decreased from baseline, will replete   2356 WBC(!): 18.27  Increased from baseline, possibly reactive secondary to vomiting however CT scan pending   2357 Procedure Note: EKG  Date/Time: 03/02/25 11:57 PM   Interpreted by: Juanita Robb  Indications / Diagnosis: vomiting  ECG reviewed by me, the ED Provider: yes   The EKG demonstrates:  Rate: 67  Rhythm: A-fib, intermittently paced  Axis: Regular  QRS/Blocks: Regular  ST Changes: No acute ST Changes, no STD/GARRY.  Compared to prior EKG on 12/31/24, patient no longer 100% paced   Mon Mar 03, 2025   0113 Patients vomiting had resolved but again returned, giving zofran   0228 Delta 2hr hsTnI: -1   0257 Will PO challenge.    0331 Patient with continued nausea, does not feel " comfortable being discharged home at this time, will reach out for admission       Medications   sodium chloride 0.9 % bolus 1,000 mL (0 mL Intravenous Stopped 3/3/25 0337)   ondansetron (ZOFRAN) injection 4 mg (4 mg Intravenous Given 3/2/25 2331)   iohexol (OMNIPAQUE) 350 MG/ML injection (SINGLE-DOSE) 100 mL (100 mL Intravenous Given 3/2/25 2357)   magnesium sulfate 2 g/50 mL IVPB (premix) 2 g (0 g Intravenous Stopped 3/3/25 0125)   ondansetron (ZOFRAN) injection 4 mg (4 mg Intravenous Given 3/3/25 0125)   aluminum-magnesium hydroxide-simethicone (MAALOX) oral suspension 30 mL (30 mL Oral Given 3/3/25 0535)   famotidine (PEPCID) tablet 20 mg (20 mg Oral Given 3/3/25 0535)       ED Risk Strat Scores                                                History of Present Illness       Chief Complaint   Patient presents with    Vomiting     Vomiting starting at 1800. States she has some mild stomach pain. States no one else at home is sick.        Past Medical History:   Diagnosis Date    A-fib (HCC)     Anemia     Arthritis     BiPAP (biphasic positive airway pressure) dependence     Breast pain, right     Chest pain on breathing     cleared by cardio on 1/10/25    CHF (congestive heart failure) (HCC)     CKD (chronic kidney disease)     Colon polyp     Cough     Diverticulosis     Edema of left lower extremity     Encounter for screening colonoscopy     Frequent falls     H/O sick sinus syndrome     Heart murmur     High cholesterol     History of atrial fibrillation     on Pradaxa    History of CVA (cerebrovascular accident)     Left- weakness    History of weight loss     Report loose fitting clothes. Weight stable (3lbs difference). Last colo showed small tubular adenoma x2. Breast biopsy last showed fibrocystic changes. TSH wnl. Will check cbc, bmp, spep.        Hx of long term use of blood thinners     Hypertension     Irregular heart beat     Left foot drop     Left hemiparesis (HCC)     OA (osteoarthritis)     RICH  (obstructive sleep apnea)     Pacemaker     Preop examination     Shortness of breath     Urinary retention     Viral bronchitis     Walker as ambulation aid     Wears glasses       Past Surgical History:   Procedure Laterality Date    BREAST BIOPSY Right 08/17/2006    ultrasound guided Percutaneous Needle Core -Benign    CATARACT EXTRACTION      CATARACT EXTRACTION W/ INTRAOCULAR LENS  IMPLANT, BILATERAL      COLONOSCOPY      COLONOSCOPY N/A 05/22/2018    Procedure: COLONOSCOPY;  Surgeon: Jenn Jang DO;  Location: [facility];  Service: Gastroenterology    INSERT / REPLACE / REMOVE PACEMAKER      LEG SURGERY Right     as a child after a fall    MAMMO STEREOTACTIC BREAST BIOPSY RIGHT (ALL INC) Right 10/2014    benign    NC CMBND ANTERPOST COLPORRAPHY W/CYSTO N/A 03/17/2016    Procedure: COLPORRHAPHY ANTERIOR POSTERIOR ;  Surgeon: Dario Braden MD;  Location: [facility];  Service: Gynecology    NC COLONOSCOPY FLX DX W/COLLJ SPEC WHEN PFRMD N/A 04/04/2019    Procedure: COLONOSCOPY;  Surgeon: Jenn Jang DO;  Location: [facility];  Service: Gastroenterology    NC COLPOPEXY VAGINAL EXTRAPERITONEAL APPROACH N/A 03/17/2016    Procedure: COLPOPEXY VAGINAL EXTRAPERITONEAL (VEC) ANTERIOR ;  Surgeon: Dario Braden MD;  [facility];  Service: Gynecology    NC CYSTOURETHROSCOPY N/A 03/17/2016    Procedure: CYSTOSCOPY;  Surgeon: Dario Braden MD;  [facility];  Service: Gynecology    NC ESOPHAGOGASTRODUODENOSCOPY TRANSORAL DIAGNOSTIC N/A 04/16/2018    Procedure: EGD AND COLONOSCOPY;  Surgeon: Jenn Jang DO;  Location: [facility];  Service: Gastroenterology    NC HYSTEROSCOPY BX ENDOMETRIUM&/POLYPC W/WO D&C N/A 1/27/2025    Procedure: DILATATION AND CURETTAGE (D&C) WITH HYSTEROSCOPY AND EXAM UNDER ANESTHESIA;  Surgeon: Maria A Bates MD;  Location: [facility];  Service: Gynecology    NC LAPAROSCOPY COLECTOMY PARTIAL W/ANASTOMOSIS Right 01/13/2022    Procedure:  Diagnostic laparoscopy, laparscopic right colectomy;  Surgeon: Andriy Guevara MD;  Location:  [facility] OR;  Service: Colorectal    IL SLING OPERATION STRESS INCONTINENCE N/A 03/17/2016    Procedure: INSERTION PUBOVAGINAL SLING SINGLE INCISION ;  Surgeon: Dario Braden MD;  Location[facility];  Service: Gynecology      Family History   Problem Relation Age of Onset    Diabetes Mother     Breast cancer Sister 53    No Known Problems Father     No Known Problems Maternal Grandmother     No Known Problems Maternal Grandfather     No Known Problems Paternal Grandmother     No Known Problems Paternal Grandfather     No Known Problems Daughter     No Known Problems Son     No Known Problems Sister     No Known Problems Sister     No Known Problems Brother     No Known Problems Brother     No Known Problems Sister     No Known Problems Paternal Aunt     No Known Problems Paternal Aunt     No Known Problems Paternal Aunt     No Known Problems Paternal Aunt       Social History     Tobacco Use    Smoking status: Never    Smokeless tobacco: Never   Vaping Use    Vaping status: Never Used   Substance Use Topics    Alcohol use: Never    Drug use: No      E-Cigarette/Vaping    E-Cigarette Use Never User       E-Cigarette/Vaping Substances    Nicotine No     THC No     CBD No     Flavoring No     Other No     Unknown No       I have reviewed and agree with the history as documented.     HPI  Patient is a 84 y.o. female with history of HTN, HLD, CKD, pacemaker presenting to the emergency department for nausea, vomiting, epigastric pain. Patient states that earlier this evening she pizza.  States that since then she has had roughly 5 episodes of nonbloody nonbilious vomiting.  Denies having any diarrhea but states that she had a loose stool since then.  Complains of having some epigastric discomfort but otherwise has no focal pain.  Denies have any chest pain/pressure/tightness.       Review of Systems   Constitutional:   Negative for fever.   HENT:  Negative for congestion.    Eyes:  Negative for visual disturbance.   Respiratory:  Negative for shortness of breath.    Cardiovascular:  Negative for chest pain.   Gastrointestinal:  Positive for abdominal pain, nausea and vomiting. Negative for blood in stool and constipation.   Endocrine: Negative for polyuria.   Genitourinary:  Negative for dysuria.   Musculoskeletal:  Negative for gait problem.   Skin:  Negative for rash.   Neurological:  Negative for dizziness.   All other systems reviewed and are negative.          Objective       ED Triage Vitals   Temperature Pulse Blood Pressure Respirations SpO2 Patient Position - Orthostatic VS   03/02/25 2246 03/02/25 2238 03/02/25 2238 03/02/25 2238 03/02/25 2246 03/02/25 2238   (!) 97.2 °F (36.2 °C) 82 162/72 18 97 % Sitting      Temp Source Heart Rate Source BP Location FiO2 (%) Pain Score    03/02/25 2246 03/02/25 2238 03/02/25 2238 -- --    Oral Monitor Right arm        Vitals      Date and Time Temp Pulse SpO2 Resp BP Pain Score FACES Pain Rating User   03/03/25 0500 -- 80 95 % 16 117/61 -- -- SRH   03/03/25 0400 -- 94 96 % -- 144/75 -- -- CC   03/03/25 0300 -- 96 -- -- 134/78 -- -- CC   03/02/25 2246 97.2 °F (36.2 °C) -- 97 % -- -- -- -- CC   03/02/25 2238 -- 82 -- 18 162/72 -- -- CC            Physical Exam  Vitals and nursing note reviewed.   Constitutional:       Appearance: Normal appearance.   HENT:      Head: Normocephalic and atraumatic.      Mouth/Throat:      Mouth: Mucous membranes are moist.   Eyes:      Conjunctiva/sclera: Conjunctivae normal.   Cardiovascular:      Rate and Rhythm: Normal rate and regular rhythm.      Pulses: Normal pulses.      Heart sounds: Normal heart sounds.   Pulmonary:      Effort: Pulmonary effort is normal.      Breath sounds: Normal breath sounds.   Abdominal:      Palpations: Abdomen is soft.      Tenderness: There is no right CVA tenderness, left CVA tenderness, guarding or rebound.       Comments: Epigastric tenderness to palpation   Musculoskeletal:         General: No tenderness.      Cervical back: Neck supple.   Skin:     General: Skin is warm and dry.      Capillary Refill: Capillary refill takes less than 2 seconds.   Neurological:      General: No focal deficit present.      Mental Status: She is alert. Mental status is at baseline.   Psychiatric:         Mood and Affect: Mood normal.         Results Reviewed       Procedure Component Value Units Date/Time    HS Troponin I 2hr [[id number]]  (Normal) Collected: 03/03/25 0159    Lab Status: Final result Specimen: Blood from Arm, Left Updated: 03/03/25 0226     hs TnI 2hr 6 ng/L      Delta 2hr hsTnI -1 ng/L     RBC Morphology Reflex Test [[id number]] Collected: 03/02/25 2250    Lab Status: Final result Specimen: Blood from Arm, Left Updated: 03/03/25 0102    CBC and differential [[id number]]  (Abnormal) Collected: 03/02/25 2250    Lab Status: Final result Specimen: Blood from Arm, Left Updated: 03/03/25 0004     WBC 18.27 Thousand/uL      RBC 4.69 Million/uL      Hemoglobin 14.4 g/dL      Hematocrit 45.2 %      MCV 96 fL      MCH 30.7 pg      MCHC 31.9 g/dL      RDW 13.8 %      MPV 10.5 fL      Platelets 243 Thousands/uL     Narrative:      This is an appended report.  These results have been appended to a previously verified report.    Manual Differential(PHLEBS Do Not Order) [[id number]]  (Abnormal) Collected: 03/02/25 2250    Lab Status: Final result Specimen: Blood from Arm, Left Updated: 03/03/25 0004     Segmented % 91 %      Bands % 6 %      Lymphocytes % 3 %      Monocytes % 0 %      Eosinophils % 0 %      Basophils % 0 %      Absolute Neutrophils 17.72 Thousand/uL      Absolute Lymphocytes 0.55 Thousand/uL      Absolute Monocytes 0.00 Thousand/uL      Absolute Eosinophils 0.00 Thousand/uL      Absolute Basophils 0.00 Thousand/uL      Total Counted --     RBC Morphology Normal     Platelet Estimate Adequate     Polychromasia Present     Comprehensive metabolic panel [[id number]]  (Abnormal) Collected: 03/02/25 2250    Lab Status: Final result Specimen: Blood from Arm, Left Updated: 03/02/25 2322     Sodium 138 mmol/L      Potassium 3.5 mmol/L      Chloride 102 mmol/L      CO2 24 mmol/L      ANION GAP 12 mmol/L      BUN 17 mg/dL      Creatinine 1.14 mg/dL      Glucose 141 mg/dL      Calcium 10.0 mg/dL      AST 16 U/L      ALT 9 U/L      Alkaline Phosphatase 79 U/L      Total Protein 8.3 g/dL      Albumin 4.2 g/dL      Total Bilirubin 0.60 mg/dL      eGFR 44 ml/min/1.73sq m     Narrative:      National Kid[employer] Foundation guidelines for Chronic Kidney Disease (CKD):     Stage 1 with normal or high GFR (GFR > 90 mL/min/1.73 square meters)    Stage 2 Mild CKD (GFR = 60-89 mL/min/1.73 square meters)    Stage 3A Moderate CKD (GFR = 45-59 mL/min/1.73 square meters)    Stage 3B Moderate CKD (GFR = 30-44 mL/min/1.73 square meters)    Stage 4 Severe CKD (GFR = 15-29 mL/min/1.73 square meters)    Stage 5 End Stage CKD (GFR <15 mL/min/1.73 square meters)  Note: GFR calculation is accurate only with a steady state creatinine    Lipase [[id number]]  (Normal) Collected: 03/02/25 2250    Lab Status: Final result Specimen: Blood from Arm, Left Updated: 03/02/25 2322     Lipase 22 u/L     Phosphorus [[id number]]  (Normal) Collected: 03/02/25 2250    Lab Status: Final result Specimen: Blood from Arm, Left Updated: 03/02/25 2322     Phosphorus 3.7 mg/dL     Magnesium [[id number]]  (Abnormal) Collected: 03/02/25 2250    Lab Status: Final result Specimen: Blood from Arm, Left Updated: 03/02/25 2322     Magnesium 1.5 mg/dL     HS Troponin 0hr (reflex protocol) [[id number]]  (Normal) Collected: 03/02/25 2250    Lab Status: Final result Specimen: Blood from Arm, Left Updated: 03/02/25 2322     hs TnI 0hr 7 ng/L             CT abdomen pelvis with contrast   Final Interpretation by Blaise Bhakta MD (03/03 0131)      No acute findings in the abdomen or pelvis.          Workstation performed: [id number]             Procedures    ED Medication and Procedure Management   Prior to Admission Medications   Prescriptions Last Dose Informant Patient Reported? Taking?   Blood Pressure Monitoring (Blood Pressure Cuff) MISC  Self No No   Sig: Use every other day For HTN   Patient not taking: Reported on 2/14/2025   Incontinence Supply Disposable (RA Adult Wipes) MISC  Self No No   Sig: Use 4 (four) times a day   Jardiance 10 MG TABS tablet  Self No No   Sig: TAKE 1 TABLET (10 MG TOTAL) BY MOUTH EVERY MORNING   Pradaxa 150 MG capsu   No No   Sig: SB JUAN ANTONIO CAPSULA POR VIA ORAL DOS VECES AL KATRINA   acetaminophen (TYLENOL) 325 mg tablet  Self No No   Sig: Take 2 tablets (650 mg total) by mouth every 6 (six) hours as needed for mild pain   Patient not taking: Reported on 2/14/2025   acetaminophen (TYLENOL) 325 mg tablet   No No   Sig: Take 2 tablets (650 mg total) by mouth every 6 (six) hours as needed for mild pain   Patient not taking: Reported on 2/14/2025   acetaminophen (TYLENOL) 500 mg tablet   Yes No   Sig: Take 500 mg by mouth every 6 (six) hours as needed for mild pain   ammonium lactate (LAC-HYDRIN) 12 % cream  Self No No   Sig: APPLY TOPICALLY AS NEEDED FOR DRY SKIN   Patient not taking: Reported on 2/14/2025   cyanocobalamin 1,000 mcg/mL  Self No No   Sig: INJECT 1 ML (1,000 MCG TOTAL) INTO A MUSCLE EVERY 30 (THIRTY) DAYS   gabapentin (NEURONTIN) 100 mg capsule   No No   Sig: TAKE 1 CAPSULE (100 MG TOTAL) BY MOUTH 3 (THREE) TIMES A DAY AS NEEDED (AS NEEDED FOR LEFT FOOT PAIN)   isosorbide mononitrate (IMDUR) 30 mg 24 hr tablet   No No   Sig: Take 1 tablet (30 mg total) by mouth daily   losartan (COZAAR) 50 mg tablet  Self No No   Sig: Take 1 tablet (50 mg total) by mouth 2 (two) times a day   miSOPROStol (Cytotec) 200 mcg tablet   No No   Sig: Take 2 tablets (400 mcg total) by mouth 1 (one) time for 1 dose Please take medication 4 hours before your surgery with water Do not start  before January 26, 2025.   nebivolol (BYSTOLIC) 5 mg tablet  Self No No   Sig: TAKE 1 TABLET (5 MG TOTAL) BY MOUTH DAILY   rosuvastatin (CRESTOR) 5 mg tablet   Yes No   Sig: Take 5 mg by mouth daily   spironolactone (ALDACTONE) 25 mg tablet   No No   Sig: TAKE 1 TABLET (25 MG TOTAL) BY MOUTH DAILY   verapamil (CALAN-SR) 240 mg CR tablet  Self No No   Sig: TAKE 1 TABLET (240 MG TOTAL) BY MOUTH DAILY AT BEDTIME      Facility-Administered Medications Last Administration Doses Remaining   cyanocobalamin injection 1,000 mcg 2/13/2025 10:49 AM         Patient's Medications   Discharge Prescriptions    ONDANSETRON (ZOFRAN-ODT) 4 MG DISINTEGRATING TABLET    Take 1 tablet (4 mg total) by mouth every 6 (six) hours as needed for nausea       Start Date: 3/3/2025  End Date: --       Order Dose: 4 mg       Quantity: 30 tablet    Refills: 0     No discharge procedures on file.  ED SEPSIS DOCUMENTATION   Time reflects when diagnosis was documented in both MDM as applicable and the Disposition within this note       Time User Action Codes Description Comment    3/3/2025 12:03 AM Juanita Robb [E83.42] Hypomagnesemia     3/3/2025 12:03 AM Juanita Robb [R11.2] Nausea & vomiting                  Juanita Robb DO  03/03/25 0624     Christel Montanez(Resident)

## 2025-03-03 NOTE — ED NOTES
Pt states that she is unable to get back into her apt because her keys are locked inside. Pt is attempting to reach grand daughter to meet her at apt with keys. Wheelchair van requested via round trip.      Sangita Cross RN  03/03/25 0644

## 2025-03-03 NOTE — DISCHARGE INSTRUCTIONS
Hoy lo atendieron en el departamento de emergencias por dolor abdominal, náuseas y vómitos.  Las pruebas mostraron que el nivel de magnesio era bajo, el recuento de glóbulos blancos estaba elevado  Haz un seguimiento con tu proveedor de atención primaria.  Carteret zofran según lo recetado según sea necesario para las náuseas / vómitos.  Regrese al departamento de emergencias si presenta cualquier síntoma nuevo o preocupante, incluidos vómitos repetidos, empeoramiento del dolor abdominal.  Nalini por elegir [facility] para addison atención hoy.    You were seen in the emergency department today for abdominal pain, nausea, vomiting.  Your testing showed your magnesium was low, your white blood cell count was elevated.   Follow up with your primary care provider.   Take zofran as prescribed as needed for nausea / vomiting.   Return to the emergency department for any new or concerning symptoms including repeated vomiting, worsening abdominal pain.   Thank you for choosing [facility] for your care today.

## 2025-03-03 NOTE — PROGRESS NOTES
Contacted to refill generic pradaxa based on insurance authorization. Previously on 150 mg BID. Due to CrCl of 30 will decrease dose to 75 mg BID.

## 2025-03-03 NOTE — ED ATTENDING ATTESTATION
3/2/2025  I, Paul Benson MD, saw and evaluated the patient. I have discussed the patient with the resident/non-physician practitioner and agree with the resident's/non-physician practitioner's findings, Plan of Care, and MDM as documented in the resident's/non-physician practitioner's note, except where noted. All available labs and Radiology studies were reviewed.  I was present for key portions of any procedure(s) performed by the resident/non-physician practitioner and I was immediately available to provide assistance.       At this point I agree with the current assessment done in the Emergency Department.  I have conducted an independent evaluation of this patient a history and physical is as follows:    ED Course         Critical Care Time  Procedures    83 yo female with hx of pacemaker, recent endometrial biopsy, afib, chf, here for n/v started few hours ago after eating potentially bad food. Pt with epigastric abdominal pain.  No urinary complaints.  Vss, afebrile, lungs cta, rrr, abdomen soft tender epigastric area, no rebound, no guarding.  Labs, trop, EKG, ct a/p, zofran, ivf.

## 2025-03-04 LAB
ATRIAL RATE: 394 BPM
QRS AXIS: -78 DEGREES
QRSD INTERVAL: 146 MS
QT INTERVAL: 426 MS
QTC INTERVAL: 450 MS
T WAVE AXIS: 93 DEGREES
VENTRICULAR RATE: 67 BPM

## 2025-03-04 PROCEDURE — 93010 ELECTROCARDIOGRAM REPORT: CPT | Performed by: INTERNAL MEDICINE

## 2025-03-04 NOTE — TELEPHONE ENCOUNTER
Spoke with patient's [facility] at . Introduced self and role. Pharmacist updated Pradaxa dosage was decreased by physician due to her kidney function. Script processed and will order for fill tomorrow.     No other needs noted at this time.

## 2025-03-10 ENCOUNTER — TELEPHONE (OUTPATIENT)
Dept: INTERNAL MEDICINE CLINIC | Facility: CLINIC | Age: 85
End: 2025-03-10

## 2025-03-10 NOTE — TELEPHONE ENCOUNTER
John  for [employer] stated patient was having an issue with the insert that goes int her shoe. It's bothering her.Can you please talk t patient about her concerns  Secondly, patient is requesting a lift chair and John stated it can be a bit tricky with medicaid and medicare to pay for it. [facility] needs pcp to place an order for the chair

## 2025-03-12 NOTE — TELEPHONE ENCOUNTER
Please send script to [facility] for a insert or brace for foot, [employer] for lift chair phone#[phone] fax#[phone] in [place]

## 2025-03-13 ENCOUNTER — CLINICAL SUPPORT (OUTPATIENT)
Dept: INTERNAL MEDICINE CLINIC | Facility: CLINIC | Age: 85
End: 2025-03-13

## 2025-03-13 DIAGNOSIS — E53.8 B12 DEFICIENCY: Primary | Chronic | ICD-10-CM

## 2025-03-13 PROCEDURE — 96372 THER/PROPH/DIAG INJ SC/IM: CPT

## 2025-03-13 RX ADMIN — CYANOCOBALAMIN 1000 MCG: 1000 INJECTION, SOLUTION INTRAMUSCULAR; SUBCUTANEOUS at 13:11

## 2025-03-14 DIAGNOSIS — I10 ESSENTIAL HYPERTENSION: ICD-10-CM

## 2025-03-14 NOTE — TELEPHONE ENCOUNTER
Please place orders if appropriate    called patient regarding no show for 10/11/23 at 215pm, patient stated that he does not need PT at this time, advised patient that if he changes his mind he may callback to see if we can schedule him

## 2025-03-17 RX ORDER — LOSARTAN POTASSIUM 50 MG/1
50 TABLET ORAL 2 TIMES DAILY
Qty: 180 TABLET | Refills: 5 | Status: SHIPPED | OUTPATIENT
Start: 2025-03-17 | End: 2026-09-08

## 2025-03-25 DIAGNOSIS — Z86.73 HISTORY OF CVA (CEREBROVASCULAR ACCIDENT): ICD-10-CM

## 2025-03-25 DIAGNOSIS — I50.30 HEART FAILURE WITH PRESERVED EJECTION FRACTION, UNSPECIFIED HF CHRONICITY (HCC): ICD-10-CM

## 2025-03-25 DIAGNOSIS — M79.672 LEFT FOOT PAIN: ICD-10-CM

## 2025-03-25 RX ORDER — GABAPENTIN 100 MG/1
100 CAPSULE ORAL 3 TIMES DAILY PRN
Qty: 90 CAPSULE | Refills: 3 | Status: SHIPPED | OUTPATIENT
Start: 2025-03-25

## 2025-03-26 DIAGNOSIS — Z86.73 HISTORY OF CVA (CEREBROVASCULAR ACCIDENT): ICD-10-CM

## 2025-03-26 DIAGNOSIS — M79.672 LEFT FOOT PAIN: ICD-10-CM

## 2025-03-26 RX ORDER — SPIRONOLACTONE 25 MG/1
25 TABLET ORAL DAILY
Qty: 90 TABLET | Refills: 1 | Status: SHIPPED | OUTPATIENT
Start: 2025-03-26

## 2025-03-26 RX ORDER — GABAPENTIN 100 MG/1
100 CAPSULE ORAL 3 TIMES DAILY PRN
Qty: 90 CAPSULE | Refills: 3 | OUTPATIENT
Start: 2025-03-26

## 2025-03-27 ENCOUNTER — RA CDI HCC (OUTPATIENT)
Dept: OTHER | Facility: HOSPITAL | Age: 85
End: 2025-03-27

## 2025-03-31 ENCOUNTER — PATIENT OUTREACH (OUTPATIENT)
Dept: INTERNAL MEDICINE CLINIC | Facility: CLINIC | Age: 85
End: 2025-03-31

## 2025-03-31 DIAGNOSIS — I50.32 CHRONIC HEART FAILURE WITH PRESERVED EJECTION FRACTION (HCC): Primary | ICD-10-CM

## 2025-03-31 DIAGNOSIS — N18.32 STAGE 3B CHRONIC KIDNEY DISEASE (HCC): ICD-10-CM

## 2025-03-31 DIAGNOSIS — Z78.9 NEED FOR COMMUNITY RESOURCE: Primary | ICD-10-CM

## 2025-03-31 DIAGNOSIS — Z86.73 HISTORY OF CVA (CEREBROVASCULAR ACCIDENT): ICD-10-CM

## 2025-03-31 NOTE — PROGRESS NOTES
SW received call from pt who had questions re a [facility] she received [facility] re a possible cut in her PA Waiver Hours.  SW added  ID# [id number] to the line.    It was difficult to follow pt and SW did ask if she has shared the letter with her son. SW has not yet but will when he visits next.  She said she had a called the   who has previously shared the she will have a  re-evaluation in Ma of the years.  Pt has had previously request for getting more PA waiver hours.    She is aware the needs to go through er  for same.  If denied she can APPEAL same .    She can ask her PCP for letter in support of more hours.  SW also reviewed the it is very uncommon the pt's get 24 hour care.  It was not clear but it appears they want to cur her hours to 72 Hours per week.  Pt is having more difficulty managing and does need an aide at night now.    Her family attempts to assist as possible.  Pt has agreed to speak to her son and Coordinator and  let SW know if she needs additional assistance.     SW has asked if she wants SW to call her Coordinator or son at this time but she declined.    SW did ask if not approved will she need Nursing Home care ?  Pt wants  to remain in here home.    Patient does not have any further questions, concerns, or other needs at this time.  Patient has my contact # and PCP office # if needed.  Social Work to remain available to assist as indicated.    Please re-consult Social Work if needed.

## 2025-04-04 ENCOUNTER — PATIENT OUTREACH (OUTPATIENT)
Dept: INTERNAL MEDICINE CLINIC | Facility: CLINIC | Age: 85
End: 2025-04-04

## 2025-04-04 DIAGNOSIS — I12.9 BENIGN HYPERTENSION WITH CHRONIC KIDNEY DISEASE, STAGE III (HCC): ICD-10-CM

## 2025-04-04 DIAGNOSIS — I69.352 SPASTIC HEMIPARESIS OF LEFT DOMINANT SIDE AS LATE EFFECT OF CEREBRAL INFARCTION (HCC): Primary | ICD-10-CM

## 2025-04-04 DIAGNOSIS — N18.30 BENIGN HYPERTENSION WITH CHRONIC KIDNEY DISEASE, STAGE III (HCC): ICD-10-CM

## 2025-04-04 DIAGNOSIS — N18.32 CKD STAGE G3B/A1, GFR 30-44 AND ALBUMIN CREATININE RATIO <30 MG/G (HCC): ICD-10-CM

## 2025-04-04 NOTE — PROGRESS NOTES
Outpatient Care Management Note:    Re:  chronic care management     Patient identified for chronic care management program. Referral was placed by . Chart reviewed. Patient has a history of hypertension, hyperlipidemia, CKD 3b/A1, A-fib, CAD, heart failure with preserved ejection fraction, left renal artery stenosis, osteoarthritis, anemia, and spastic hemiparesis of left dominant side as late effect of cerebral infarction. Patient also has history of pacemaker, GERD, Vitamin B12 deficiency, obstructive sleep apnea (seen by sleep medicine on 2/12/25 nad encouraged to continue CPAP),and osteopenia. Patient had D&C hysteroscopy for postmenopausal bleeding on 1/27/25. Patient last seen by PCP on 10/31/24 for AWV.      Patient follows with Nephrology and last seen 12/10/24. Creatine at that time was stable at 1.2 mg/dL.  To follow up in August 2025.    Follows with Neurology and Cardiology.     Next PCP appointment on 4/15/25.     I called patient using "[employer] # [id number]. I explained my role and reason for call. Patient agreeable for further outreach. She reports she has been feeling ok and denies any complaints at this time. She reports she has PA waiver hours in the home and has an aide:    Monday to Friday - 24 hour care and has an aide come from 9am-9pm and then 9pm-9am  Saturday has 24 hour care and has an aide coming from 9 am-9am and then 9pm-9am  Sunday - varies with aide and will go to her son's house on weekends she does not have an aide.     Patient reports she needs help with bathing, getting dressed, getting in and out of bed or a chair. Patient reports she wants to stay safe and continue to live where she does but is worried about falls. We talked about things she can do at home to help prevent a fall. Patient uses a walker to ambulate. Discussed about Life Alert.     Patient manages her own medications gets them delivered and in bubble packs from [facility].  Patient  familiar with her medications and we reviewed how to take them and what they are for and importance of taking them as prescribed.     Patient has transportation with her insurance. She reports he son will take her to doctor appointments.     Patient denies any issues with incontinence. She denies any vision problems but is planning on following up with an eye doctor for an evaluation. She reports she has a list of providers accepted by her insurance but has not had a chance to further review and call for an appointment. Patient reports some decreased hearing in her left ear at times.     Patient reports interest in obtaining a lift chair and [facility]. Patient will be requesting a script at upcoming PCP appointment. Patient reports her left leg does not function well from her stroke and continues with weakness and is difficult for her to transfer in and out of bed and get up from a sitting position.     Patient does not have any further questions, concerns, or other needs at this time. Patient has my contact number [phone] and PCP office number if needed. Patient is agreeable for further outreach. Will plan to further follow up after PCP appointment.     Start of care assessment completed, ADL assessment completed, stroke assessment started. Careplan started.  Medications reviewed.

## 2025-04-04 NOTE — PROGRESS NOTES
Chronic Care Management Program Consent:    Patient informed of availability of Chronic Care Management services. The services will billed monthly by their Primary Care Provider only. Patient is informed there may be a monthly cost sharing associated with the Chronic Care Management services. Patient is aware that financial counseling is available to assist with any co-pay questions or concerns.    Chronic Care Management services include:    24/7 access to care.  Comprehensive plan of care created by the provider.  Individualized care planning by the care manager(s).  Transitional care support.    The patient is informed that they have the right to stop Chronic Care Management services at anytime.       Patient consents to Chronic Care Management services? Yes      Patient informed that consent is needed only once unless the patient switches qualifying providers.

## 2025-04-15 ENCOUNTER — OFFICE VISIT (OUTPATIENT)
Dept: INTERNAL MEDICINE CLINIC | Facility: CLINIC | Age: 85
End: 2025-04-15

## 2025-04-15 VITALS
SYSTOLIC BLOOD PRESSURE: 136 MMHG | HEART RATE: 82 BPM | BODY MASS INDEX: 24.45 KG/M2 | OXYGEN SATURATION: 98 % | TEMPERATURE: 97.5 F | WEIGHT: 138 LBS | DIASTOLIC BLOOD PRESSURE: 77 MMHG

## 2025-04-15 DIAGNOSIS — I50.32 CHRONIC HEART FAILURE WITH PRESERVED EJECTION FRACTION (HCC): ICD-10-CM

## 2025-04-15 DIAGNOSIS — I63.30 CEREBRAL THROMBOSIS WITH CEREBRAL INFARCTION (HCC): ICD-10-CM

## 2025-04-15 DIAGNOSIS — G47.33 OSA (OBSTRUCTIVE SLEEP APNEA): ICD-10-CM

## 2025-04-15 DIAGNOSIS — Z71.89 GOALS OF CARE, COUNSELING/DISCUSSION: ICD-10-CM

## 2025-04-15 DIAGNOSIS — I10 ESSENTIAL HYPERTENSION: Primary | ICD-10-CM

## 2025-04-15 PROCEDURE — 3075F SYST BP GE 130 - 139MM HG: CPT

## 2025-04-15 PROCEDURE — G2211 COMPLEX E/M VISIT ADD ON: HCPCS

## 2025-04-15 PROCEDURE — 99213 OFFICE O/P EST LOW 20 MIN: CPT

## 2025-04-15 PROCEDURE — 3078F DIAST BP <80 MM HG: CPT

## 2025-04-15 NOTE — ASSESSMENT & PLAN NOTE
Wt Readings from Last 3 Encounters:   04/15/25 62.6 kg (138 lb)   02/14/25 60.7 kg (133 lb 12.8 oz)   02/12/25 65.3 kg (144 lb)     GDMT: Jardiance, Spirolactone, Losartan  Off diuretics per cardiology  Euvolemic on exam today

## 2025-04-15 NOTE — ASSESSMENT & PLAN NOTE
Follows with neurology  Currently on Pradaxa   Continued residual effects of L leg  Has been ordered PT many times in the past but issues with insurance/coordination-will place order again

## 2025-04-15 NOTE — PROGRESS NOTES
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)  Follows with neurology  Currently on Pradaxa   Continued residual effects of L leg  Has been ordered PT many times in the past but issues with insurance/coordination-will place order again    Chronic heart failure with preserved ejection fraction (HCC)  Wt Readings from Last 3 Encounters:   04/15/25 62.6 kg (138 lb)   25 60.7 kg (133 lb 12.8 oz)   25 65.3 kg (144 lb)     GDMT: Jardiance, Spirolactone, Losartan  Off diuretics per cardiology  Euvolemic on exam today    RICH (obstructive sleep apnea)  Seen by sleep medicine  Currently trialing CPAP  Goals of care, counseling/discussion  Patient has a significant amount of chronic health conditions and has required a significant amount of more care recently-I am in agreement that she does need 24/7 care or should be in an assisted living facility  Due to these chronic conditions-there is some slow decline in physical as well as mental status. I instructed the patient to bring her son with her to the next appointment so we can discuss ACP and do a formal MOCA       History of Present Illness   85 y/o with extensive past medical history including hypertension, hyperlipidemia, CKD 3, A-fib, CVA, CAD, RICH, pulmonary hypertension, tachybradycardia syndrome (pacemaker placed), heart failure preserved ejection fraction. Patient is here for chronic conditions follow up.      Review of Systems   Constitutional:         Generalized weakness   Respiratory:  Negative for shortness of breath.    Cardiovascular:  Positive for leg swelling (L side chronic). Negative for chest pain.   Gastrointestinal:  Negative for  abdominal pain.   Musculoskeletal: Negative.    Neurological: Negative.    Psychiatric/Behavioral: Negative.         Objective   /77 (BP Location: Left arm, Patient Position: Sitting, Cuff Size: Standard)   Pulse 82   Temp 97.5 °F (36.4 °C) (Temporal)   Wt 62.6 kg (138 lb)   SpO2 98%   BMI 24.45 kg/m²      Physical Exam  Vitals reviewed.   HENT:      Head: Normocephalic and atraumatic.      Mouth/Throat:      Mouth: Mucous membranes are moist.      Pharynx: Oropharynx is clear.   Cardiovascular:      Rate and Rhythm: Normal rate and regular rhythm.   Pulmonary:      Effort: Pulmonary effort is normal.      Breath sounds: Normal breath sounds.   Abdominal:      General: Abdomen is flat. Bowel sounds are normal. There is no distension.      Tenderness: There is no abdominal tenderness.   Musculoskeletal:      Right lower leg: Edema present.      Left lower leg: No edema.   Skin:     General: Skin is warm and dry.   Neurological:      Mental Status: She is alert and oriented to person, place, and time.      Comments: At baseline- LLE 4/5   Psychiatric:         Mood and Affect: Mood normal.         Behavior: Behavior normal.

## 2025-04-21 ENCOUNTER — PATIENT OUTREACH (OUTPATIENT)
Dept: INTERNAL MEDICINE CLINIC | Facility: CLINIC | Age: 85
End: 2025-04-21

## 2025-04-21 DIAGNOSIS — I48.20 CHRONIC ATRIAL FIBRILLATION (HCC): ICD-10-CM

## 2025-04-21 DIAGNOSIS — I50.32 CHRONIC HEART FAILURE WITH PRESERVED EJECTION FRACTION (HCC): Primary | ICD-10-CM

## 2025-04-21 DIAGNOSIS — I63.30 CEREBRAL THROMBOSIS WITH CEREBRAL INFARCTION (HCC): ICD-10-CM

## 2025-04-21 NOTE — PROGRESS NOTES
Outpatient Care Management Note:    Re:  chronic care management     Chart reviewed.     Patient seen by PCP on 4/15/25. Patient to return to office in 3 months and bring son to next appointment to discuss ACP and do a formal MOCA.     PT was ordered     Mammogram scheduled for 6/3.     I called patient using [employer]  # [id number]. I reviewed my role and reason for outreach. Patient reports feeling ok at this time and denies any complaints. Reviewed last PCP appointment and to follow up in 3 months. She is aware to bring son to next appointment and we discussed reasons for this. She is in agreement and will be speaking with her son and will call to schedule as well as outpatient physical therapy. She is aware of [facility] PT locations. She reports would prefer to go to [facility] PT on [address] in [place] if able to. She declined at this time the need for me to reach out to her son. I encouraged her to provide my contact number to him if he has further questions or concerns. She voiced understanding.     She reports she has all of her medications at this time and taking as prescribed.     She continues with PA waiver services in the home.     Reviewed upcoming Cardiology appointment on 5/6.     Lyft chair ordered in paracte on 3/24/25. Clinical team to further follow up.     Patient was talking about transportation which she does have in place and  and I could no longer hear patient. Attempted to call back but went to voicemail.     Careplan reviewed.

## 2025-04-25 ENCOUNTER — PATIENT OUTREACH (OUTPATIENT)
Dept: INTERNAL MEDICINE CLINIC | Facility: CLINIC | Age: 85
End: 2025-04-25

## 2025-04-25 DIAGNOSIS — I69.352 SPASTIC HEMIPARESIS OF LEFT DOMINANT SIDE AS LATE EFFECT OF CEREBRAL INFARCTION (HCC): Primary | ICD-10-CM

## 2025-04-25 DIAGNOSIS — I63.30 CEREBRAL THROMBOSIS WITH CEREBRAL INFARCTION (HCC): ICD-10-CM

## 2025-04-25 DIAGNOSIS — I50.32 CHRONIC HEART FAILURE WITH PRESERVED EJECTION FRACTION (HCC): ICD-10-CM

## 2025-04-25 NOTE — PROGRESS NOTES
Outpatient Care Management Note:    Re:  chronic care management     Per chart review order for lift chair was sent to [employer]. On 3/24/25. Does not appear they received ordered. I called them at [phone] and spoke with Catie. She reports they did not receive order and do use parachute. Requesting order be sent again and/or can fax to [phone] if needed.     I did call  with PA waiver emily Betancourt at [phone] and no answer and left message requesting a call back regarding patient's request for lift chair.     Will request for clinical team to further handle resending order in parachute.

## 2025-04-28 ENCOUNTER — PATIENT OUTREACH (OUTPATIENT)
Dept: INTERNAL MEDICINE CLINIC | Facility: CLINIC | Age: 85
End: 2025-04-28

## 2025-04-28 DIAGNOSIS — I63.30 CEREBRAL THROMBOSIS WITH CEREBRAL INFARCTION (HCC): Primary | ICD-10-CM

## 2025-04-28 DIAGNOSIS — I69.352 SPASTIC HEMIPARESIS OF LEFT DOMINANT SIDE AS LATE EFFECT OF CEREBRAL INFARCTION (HCC): ICD-10-CM

## 2025-04-28 NOTE — PROGRESS NOTES
Called using  and advised patient that she needs to call her insurance to see where this needs to be sent to. I looked up multiple locations an this item did not show up. Patient verbalized understanding and will call insurance to get name of supplier she can use for chair lift. She will call back.

## 2025-04-28 NOTE — PROGRESS NOTES
Per [place] regarding lift chair: [facility] unable to fulfill order due to being out of service area. Will request for clinical team to send to another DME supplier.     Svetlana Viera  [facility]  4/25 ? 4:36PM  [facility] is unable to fulfill the order for the following reason:  Out of service area.

## 2025-04-28 NOTE — PROGRESS NOTES
Outpatient Care Management Note:    Re:  chronic care management     Per parachute regarding updated script sent for lift chair on 4/26/25 : [facility] unable to fulfill order due to being out of service area. Will request for clinical team to send to another DME supplier. Inbasket message sent. Please see outreach note from 4/25.     Svetlana Viera  [facility]  4/25 ? 4:36PM  [facility] is unable to fulfill the order for the following reason:  Out of service area.

## 2025-04-30 NOTE — PROGRESS NOTES
Received call from IZAIAH Lopez with [employer]. Per John, the following companies are in network for a lift chair: [employer] (), [employer] () and [employer] [phone]. If these companies aren't working, office is welcome to contact him directly at .     Please review/place order through another DME.

## 2025-04-30 NOTE — PROGRESS NOTES
I attempted to place this order via parachut but it does not appear these orders are coming up in Epic. AS doc of the day, please place order and print and have attending sign. Please give to clinical staff to fax to appropriate supplier.

## 2025-05-01 ENCOUNTER — PATIENT OUTREACH (OUTPATIENT)
Dept: INTERNAL MEDICINE CLINIC | Facility: CLINIC | Age: 85
End: 2025-05-01

## 2025-05-01 DIAGNOSIS — Z74.8 ASSISTANCE NEEDED WITH TRANSPORTATION: Primary | ICD-10-CM

## 2025-05-01 DIAGNOSIS — I63.30 CEREBRAL THROMBOSIS WITH CEREBRAL INFARCTION (HCC): Primary | ICD-10-CM

## 2025-05-01 DIAGNOSIS — I48.20 CHRONIC ATRIAL FIBRILLATION (HCC): ICD-10-CM

## 2025-05-01 DIAGNOSIS — I69.352 SPASTIC HEMIPARESIS OF LEFT DOMINANT SIDE AS LATE EFFECT OF CEREBRAL INFARCTION (HCC): ICD-10-CM

## 2025-05-01 NOTE — PROGRESS NOTES
Outpatient Care Management Note:    Re:  chronic care management     Chart reviewed.     Patient has follow up with Cardiology on 5/6.   Mammogram on 6/3.     Further following up on request for lift chair. Did not receive a call back from . Per [place] script for lift chair on 4/26/25 sent to [facility] unable to fulfill due to being out of service area. Request was sent to clinical team to further assist with sending to another DME supplier.     I called patient using "[employer]  # [id number]. Patient reports she is feeling ok but does state she has been feeling tired. She does report left leg feeling heavy. Patient has history of stroke affecting her left side. [facility] [address] and reports she was told that they would be sending someone to her house for PT but no one showed.    Patient requesting I further follow up with her son and reports best to reach him in the AM as he works in the afternoons. Patient needs follow up with PCP mid July and to bring son to next appointment to discuss ACP and do a formal MOCA.        Patient shares that she discussed with PCP at last appointment her medications and why she is not received brand names of medications like she previously had. I did make her aware if physician wanted her to have brand medication they would need to send script that way and if they don't pharmacy has the right to substitute it for a generic if available. She is requesting for her PCP to further review her medications and recommend if she needs to be on the Brand name of any of her medications as she reports she feels they worked better for her. Will have PCP further advise.     Patient reports she was recently assigned a new  who will be out to see her on 5/6 @ 10 am.     Brandi Harris-   Phone: [phone]    Patient reports she did receive a call from PCP office regarding  her lift chair and that she needed to reach out to her insurance to find a DME supplier. Patient reports she spoke with a John with her [employer] insurance and he was going to call PCP office to let them know of DME companies. Patient did not have any further info or his contact number.     Will request clinical team to send to [email or web address] and see if they received any further information.       Reviewed with patient remote device check on 5/6 and follow up with cardiology on 7/11. Patient aware of mammogram scheduled on 6/3.     Patient does not have any further questions, concerns, or other needs at this time. Patient has my contact number [phone] and PCP office number [phone] if needed. Patient is agreeable for further outreach.     Careplan reviewed.

## 2025-05-02 ENCOUNTER — PATIENT OUTREACH (OUTPATIENT)
Dept: INTERNAL MEDICINE CLINIC | Facility: CLINIC | Age: 85
End: 2025-05-02

## 2025-05-02 DIAGNOSIS — I63.30 CEREBRAL THROMBOSIS WITH CEREBRAL INFARCTION (HCC): Primary | ICD-10-CM

## 2025-05-02 DIAGNOSIS — I69.352 SPASTIC HEMIPARESIS OF LEFT DOMINANT SIDE AS LATE EFFECT OF CEREBRAL INFARCTION (HCC): ICD-10-CM

## 2025-05-02 NOTE — PROGRESS NOTES
Outpatient Care Management Note:    Re:  chronic care management     I called patient's son per patient's request to further follow up with him. No answer and left message with my name, role, and reason for call. I received a call back for patient's son.     Reviewed need for follow up PCP appointment in July. I did review with him his mom's PCP, Dr Hermosillo will be graduating and physician taking over for Dr. Hermosillo will be Dr. Perkins. Reviewed need for follow up appointment in July and reviewed this is to discuss goals of care and advance care planning, and do formal MOCA/cognitive evaluation. He voiced understanding. Will request for clerical team to reach out to patient's son Donal to schedule. He is aware clerical team to be reaching out.     I reviewed about ordered for physical therapy. He feels outpatient PT would be more beneficial and will speak with him mom regarding this and given contact number for [address] [phone] to call and schedule when convenient for son to take patient with his work schedule.    Reviewed with son about patient's request for Brand name medication and my discussion with her and PCP did not feel any changes to her medication were needed at this time. Son voiced understanding and has also had multiple conversations with patient regarding this and will review with her.     Reviewed with him request for lift chair. Aware request was sent to PCP office to send script to [employer]. He will also mention this to new . He is aware of visit with  on Tuesday 5/6 @ 10 am and son plans on being present for that.     Son does not have any further questions, concerns, or other needs at this time. He was appreciative of the call. He has my contact number if needed and PCP office number.     Will continue to outreach.

## 2025-05-02 NOTE — PROGRESS NOTES
call patient's son, Donal at [phone] advised once the new scheduled open for the providers we would give him a call to schedule appt for mom

## 2025-05-06 ENCOUNTER — REMOTE DEVICE CLINIC VISIT (OUTPATIENT)
Dept: CARDIOLOGY CLINIC | Facility: CLINIC | Age: 85
End: 2025-05-06
Payer: MEDICARE

## 2025-05-06 ENCOUNTER — RESULTS FOLLOW-UP (OUTPATIENT)
Dept: CARDIOLOGY CLINIC | Facility: CLINIC | Age: 85
End: 2025-05-06

## 2025-05-06 DIAGNOSIS — Z95.0 PRESENCE OF PERMANENT CARDIAC PACEMAKER: Primary | ICD-10-CM

## 2025-05-06 PROCEDURE — 93294 REM INTERROG EVL PM/LDLS PM: CPT | Performed by: INTERNAL MEDICINE

## 2025-05-06 PROCEDURE — 93296 REM INTERROG EVL PM/IDS: CPT | Performed by: INTERNAL MEDICINE

## 2025-05-06 NOTE — PROGRESS NOTES
Results for orders placed or performed in visit on 05/06/25   Cardiac EP device report    Narrative    MDT-SINGLE CHAMBER PPM / NOT MRI CONDITIONAL  CARELINK TRANSMISSION: BATTERY VOLTAGE ADEQUATE. (8.1 YRS)  75%. ALL AVAILABLE LEAD PARAMETERS WITHIN NORMAL LIMITS. NO SIGNIFICANT HIGH RATE EPISODES. SINGLE PVC COUNT 38.5/HOUR; PVC RUNS 6.7/HOUR. NORMAL DEVICE FUNCTION.---OTERO

## 2025-05-07 ENCOUNTER — TELEPHONE (OUTPATIENT)
Dept: INTERNAL MEDICINE CLINIC | Facility: CLINIC | Age: 85
End: 2025-05-07

## 2025-05-07 NOTE — TELEPHONE ENCOUNTER
Folder Color: red     Name of Form: [employer] medical necessity      Form to be filled out by: dr Hermosillo     Form to be Faxed: [phone]     Patient made aware of 10 business day policy.

## 2025-05-21 ENCOUNTER — TELEPHONE (OUTPATIENT)
Dept: INTERNAL MEDICINE CLINIC | Facility: CLINIC | Age: 85
End: 2025-05-21

## 2025-05-21 NOTE — TELEPHONE ENCOUNTER
Received call from Brandi requesting status update for seat lift script.     I see that was ordered on 5/1 but I am not sure where order was sent to.    Please call Brandi with an update.

## 2025-05-22 NOTE — TELEPHONE ENCOUNTER
Spoke to Brandi from [employer] at [phone]. She requested form of Medical Necessity and Script for Seat Lift Chair to her email at [email or web address]. All information sent via email.

## 2025-05-29 ENCOUNTER — CLINICAL SUPPORT (OUTPATIENT)
Dept: INTERNAL MEDICINE CLINIC | Facility: CLINIC | Age: 85
End: 2025-05-29

## 2025-05-29 DIAGNOSIS — E53.8 B12 DEFICIENCY: Primary | ICD-10-CM

## 2025-05-29 RX ADMIN — CYANOCOBALAMIN 1000 MCG: 1000 INJECTION, SOLUTION INTRAMUSCULAR; SUBCUTANEOUS at 11:15

## 2025-05-29 NOTE — PROGRESS NOTES
Patient came into the office for nurse visit for Vit B12 injection. Patient supplied the serum and 1.0 ml was given in left deltoid.     [id number]  Lot: [id number]  Exp: 05/31/2026

## 2025-06-02 ENCOUNTER — PATIENT OUTREACH (OUTPATIENT)
Dept: INTERNAL MEDICINE CLINIC | Facility: CLINIC | Age: 85
End: 2025-06-02

## 2025-06-02 DIAGNOSIS — I63.30 CEREBRAL THROMBOSIS WITH CEREBRAL INFARCTION (HCC): Primary | ICD-10-CM

## 2025-06-02 DIAGNOSIS — I69.352 SPASTIC HEMIPARESIS OF LEFT DOMINANT SIDE AS LATE EFFECT OF CEREBRAL INFARCTION (HCC): ICD-10-CM

## 2025-06-02 NOTE — PROGRESS NOTES
Outpatient Care Management Note:    Re:  chronic care management     Chart reviewed     Script for lift chair and medical necessity emailed to , Brandi on 5/22/25. I called Brandi at [phone] to further follow up and had to leave message and requested a call back.     I called patient using [email or web address]  # [id number]. She reports she has been feeling well other than occasional nausea and sometimes vomiting. She does associate this with eating and reports has been going on since December. Patient has Zofran to take as needed. She reports she is eating and drinking as normal. Encouraged small more frequent meals. Patient plans to discuss further at upcoming appointment. Declining a need for sooner appointment at this time. She does report diarrhea at times that will last for 1 day. Encouraged to schedule sooner follow up if diarrhea would not go away or would happen more frequently. She voiced understanding.     Patient reports she has all of her medications at taking as prescribed. She continues with her PA waiver hours for help in the home.     She is aware of mammogram tomorrow 6/3 @ 1 pm.       She is requesting to know about physical therapy. I again reviewed with her that outpatient physical therapy is recommended and that I have spoken with her son regarding this and locations. She reports understanding and she will give it further consideration.     Reviewed clerical team will be reaching out to her and her son when schedule becomes available to schedule follow up with PCP office (to discuss goals of care).     Patient has not received any further info or calls regarding lift chair. I did make her aware I did reach out to  and left message requesting update.  I also placed call to [employer] at [phone] and left message if they received script and requested update.     Patient does not have any further questions, concerns, or other needs at  this time. Patient has my contact number [phone] and PCP office number [phone] if needed. Patient is agreeable for further outreach.     Careplan reviewed.

## 2025-06-03 ENCOUNTER — PATIENT OUTREACH (OUTPATIENT)
Dept: INTERNAL MEDICINE CLINIC | Facility: CLINIC | Age: 85
End: 2025-06-03

## 2025-06-03 ENCOUNTER — HOSPITAL ENCOUNTER (OUTPATIENT)
Dept: MAMMOGRAPHY | Facility: HOSPITAL | Age: 85
Discharge: HOME | End: 2025-06-03
Payer: MEDICARE

## 2025-06-03 VITALS — WEIGHT: 138 LBS | HEIGHT: 63 IN | BODY MASS INDEX: 24.45 KG/M2

## 2025-06-03 DIAGNOSIS — I63.30 CEREBRAL THROMBOSIS WITH CEREBRAL INFARCTION (HCC): ICD-10-CM

## 2025-06-03 DIAGNOSIS — Z12.31 ENCOUNTER FOR SCREENING MAMMOGRAM FOR BREAST CANCER: ICD-10-CM

## 2025-06-03 DIAGNOSIS — I69.352 SPASTIC HEMIPARESIS OF LEFT DOMINANT SIDE AS LATE EFFECT OF CEREBRAL INFARCTION (HCC): Primary | ICD-10-CM

## 2025-06-03 PROCEDURE — 77067 SCR MAMMO BI INCL CAD: CPT

## 2025-06-03 PROCEDURE — 77063 BREAST TOMOSYNTHESIS BI: CPT

## 2025-06-03 NOTE — PROGRESS NOTES
Outpatient Care Management Note:    Re:  chronic care management     I received a call from Brandi Mendoza,  ( phone # [phone]) that she faxed script and letter of medical necessity to [employer] on 5/22 and she reached out yesterday and they did not have the information and was going to fax information again tomorrow and further follow up.     I received a return call from [employer] and was told they do not handle lift chairs/recliners.     I reached out to [facility] at [phone] and they do carry lift chairs and do participate with patient's insurance. Order to be sent over via parachute.     I spoke with  and made her aware of the info above. Will request for PCP/clinical team to send order for lift chair in parachute to [facility] along with office notes and letter of medical necessity.

## 2025-06-04 DIAGNOSIS — R26.2 AMBULATORY DYSFUNCTION: Primary | ICD-10-CM

## 2025-06-04 NOTE — PROGRESS NOTES
Please order it regularly and print script. We will have to fax to [employer]. Please also complete the letter or medical necessity so we can fax along with order.

## 2025-06-09 ENCOUNTER — PATIENT OUTREACH (OUTPATIENT)
Dept: INTERNAL MEDICINE CLINIC | Facility: CLINIC | Age: 85
End: 2025-06-09

## 2025-06-09 DIAGNOSIS — I69.352 SPASTIC HEMIPARESIS OF LEFT DOMINANT SIDE AS LATE EFFECT OF CEREBRAL INFARCTION (HCC): Primary | ICD-10-CM

## 2025-06-09 DIAGNOSIS — I63.30 CEREBRAL THROMBOSIS WITH CEREBRAL INFARCTION (HCC): ICD-10-CM

## 2025-06-09 NOTE — PROGRESS NOTES
Outpatient Care Management Note:    Re:  chronic care management       Received message from patient's , Brandi Mendoza with [facility] on Friday 6/6 that she followed up with [facility] regarding chair lift. She shared that they do not participate with her insurance and diagnosis would not qualify her for this type of chair and they do not have this type of chair. She is requesting a call back to further discuss [phone].     I called Abida at [phone] and was told they called PCP office twice to make aware that they do not process lift chair through Medicaid. Medicare only covers the actual lift mechanism. He shares these types of chairs start around $950.     I called [facility] and they do carry these types of chairs but can only process them for patient's that have [employer] and [facility].     I called [facility] at [phone] and spoke with Alfonso. He shares that do carry lift chairs and are able to assist for patient's that have [facility]. He provided me number for their other office [phone] and to request to speak with Kimberli who handles these requests. I called and spoke with them and they will give message to Kimberli to call me. I left my contact number.     I called Brandi,  and left message providing her an update.

## 2025-06-11 ENCOUNTER — PATIENT OUTREACH (OUTPATIENT)
Dept: INTERNAL MEDICINE CLINIC | Facility: CLINIC | Age: 85
End: 2025-06-11

## 2025-06-11 DIAGNOSIS — I69.352 SPASTIC HEMIPARESIS OF LEFT DOMINANT SIDE AS LATE EFFECT OF CEREBRAL INFARCTION (HCC): Primary | ICD-10-CM

## 2025-06-11 DIAGNOSIS — I63.30 CEREBRAL THROMBOSIS WITH CEREBRAL INFARCTION (HCC): ICD-10-CM

## 2025-06-11 NOTE — PROGRESS NOTES
Outpatient Care Management Note:    Re:  chronic care management     Please see outreach note from 6/9/25 with more info. I called Nakul [facility] at [phone] and requested to speak with Kimberli regarding lift chair. She was not available and provided my contact info and reason for call and requested a call back. I did mention I called the other day and did not receive a call back yet.     I did receive a message from patient's , John with [employer]. He was following up regarding DME and trying to check status. His contact number is [phone]. Will plan to further follow up when I have further info.

## 2025-06-13 DIAGNOSIS — I10 HYPERTENSION, UNSPECIFIED TYPE: ICD-10-CM

## 2025-06-16 ENCOUNTER — PATIENT OUTREACH (OUTPATIENT)
Dept: INTERNAL MEDICINE CLINIC | Facility: CLINIC | Age: 85
End: 2025-06-16

## 2025-06-16 DIAGNOSIS — I63.30 CEREBRAL THROMBOSIS WITH CEREBRAL INFARCTION (HCC): ICD-10-CM

## 2025-06-16 DIAGNOSIS — I69.352 SPASTIC HEMIPARESIS OF LEFT DOMINANT SIDE AS LATE EFFECT OF CEREBRAL INFARCTION (HCC): Primary | ICD-10-CM

## 2025-06-16 RX ORDER — NEBIVOLOL 5 MG/1
5 TABLET ORAL DAILY
Qty: 90 TABLET | Refills: 3 | Status: SHIPPED | OUTPATIENT
Start: 2025-06-16

## 2025-06-24 ENCOUNTER — PATIENT OUTREACH (OUTPATIENT)
Dept: INTERNAL MEDICINE CLINIC | Facility: CLINIC | Age: 85
End: 2025-06-24

## 2025-06-24 DIAGNOSIS — I63.30 CEREBRAL THROMBOSIS WITH CEREBRAL INFARCTION (HCC): ICD-10-CM

## 2025-06-24 DIAGNOSIS — I69.352 SPASTIC HEMIPARESIS OF LEFT DOMINANT SIDE AS LATE EFFECT OF CEREBRAL INFARCTION (HCC): Primary | ICD-10-CM

## 2025-06-24 NOTE — PROGRESS NOTES
Outpatient Care Management Note:    Re:  chronic care management     Spoke with Kimberli at [facility] regarding lift chair for patient. Script, office notes and letter of medical necessity was faxed on 6/19/25. Per Kimberli they did not receive it. I confirmed fax number [phone] and she reports could also email info to [email or web address]. [employer] message sent to clinical team to request info be re-resent.

## 2025-06-30 ENCOUNTER — OFFICE VISIT (OUTPATIENT)
Dept: INTERNAL MEDICINE CLINIC | Facility: CLINIC | Age: 85
End: 2025-06-30

## 2025-06-30 DIAGNOSIS — E53.8 B12 DEFICIENCY: Primary | ICD-10-CM

## 2025-06-30 RX ADMIN — CYANOCOBALAMIN 1000 MCG: 1000 INJECTION, SOLUTION INTRAMUSCULAR; SUBCUTANEOUS at 10:31

## 2025-06-30 NOTE — PROGRESS NOTES
Patient came into the office for nurse visit for Vit B12 injection. Patient supplied the serum and 1.0 ml was given in left deltoid.      [id number]  Lot: [id number]  Exp: 05/2026

## 2025-07-03 ENCOUNTER — PATIENT OUTREACH (OUTPATIENT)
Dept: INTERNAL MEDICINE CLINIC | Facility: CLINIC | Age: 85
End: 2025-07-03

## 2025-07-03 DIAGNOSIS — I10 ESSENTIAL HYPERTENSION: ICD-10-CM

## 2025-07-03 DIAGNOSIS — I69.352 SPASTIC HEMIPARESIS OF LEFT DOMINANT SIDE AS LATE EFFECT OF CEREBRAL INFARCTION (HCC): Primary | ICD-10-CM

## 2025-07-03 DIAGNOSIS — I63.30 CEREBRAL THROMBOSIS WITH CEREBRAL INFARCTION (HCC): ICD-10-CM

## 2025-07-03 NOTE — PROGRESS NOTES
Lvm on patient's son Chapis ( [phone] ) voicemail to make an appointment for 3 months for goals of care discussion and her son is to come to appointment

## 2025-07-03 NOTE — PROGRESS NOTES
Outpatient Care Management Note:    Re:  chronic care management     Script for lift chair and office notes re-sent to [facility] on 6/26/25. I called and and spoke with Kimberli at [facility] and she confirms she received info. She will send medical necessity to PCP office for provider to review and complete. I did provider her with patient's phone number and also 's information.     Brandi Mendoza -  [phone]    Will sent message to clerical team to be aware of incoming fax.     I did call and speak with John, care manager ([phone]) with [facility] and Brandi ([phone]),  with [facility] and provided update.     I did make John aware sooner PCP follow up is needed as she she scheduled with PCP in November. Will send message to clerical team to assist with scheduling as Dr. Hermosillo recommended 3 month follow up and have son come to next visit for a goals of care discussion. Seen last by Dr. Hermosillo on 4/15/25 who has graduated.     I spoke with patient and reviewed info above. Encouraged to schedule with outpatient physical therapy as previously discussed for left leg weakness. Patient reports she has all of her medications at this time and taking as prescribed. Reviewed follow up with Cardiology on 7/11 and Nephrology on 8/19. Patient continues to go to PCP office for Vitamin B12 injections.     Patient uses walker to ambulate. Denies any recent falls.     Patient does not have any further questions, concerns, or other needs at this time. Patient has my contact number [phone] and PCP office number [phone] if needed. Patient is agreeable for further outreach.     Careplan reviewed.

## 2025-07-05 DIAGNOSIS — I10 ESSENTIAL HYPERTENSION: ICD-10-CM

## 2025-07-07 RX ORDER — VERAPAMIL HYDROCHLORIDE 240 MG/1
240 TABLET, FILM COATED, EXTENDED RELEASE ORAL
Qty: 90 TABLET | Refills: 3 | OUTPATIENT
Start: 2025-07-07

## 2025-07-10 NOTE — PROGRESS NOTES
"Advanced Heart Failure/Pulmonary Hypertension Outpatient Note - Ade Moon 84 y.o. female MRN: [id number]    @ Encounter: [id number]    Assessment:  84 y.o. female Hx per chart p/w HF fu.  I first met patient 8/7/23.    Follows primarily with EP Dr. Canela in past  AF on AC  HFpEF, RV size/fxn wnl remote echos  LVH, 2/2024 TTE IVSd 1.3cm  PYP scan was not suggestive of cardiac amyloidosis. Perugini 0.  Cannot get CMR 2/2 her PPM model.  AL screen not suggestive.  Mild AS, mild AI  PH. +RVOT notching. Significant group II contribution likely.  2023 echo with suggestion of borderline elevated filling pressures, worse on R. Note the maxTRV is measured overly generously. RVSP was borderline elevated by echo.  CVA with L hemiparesis and chronic LLE edema  Tachybrady syndrome s/p S-PPM, VVI pacemaker. Per chart:  \"She had left sided erosion remotely and lead extracted. Paces up to 76 % since they increased bystolic\"  Nonobstructive CAD. Had 2021 LHC for borderline stress test, revealed nonobstructive dz.  2021 [facility]:  CORONARY VESSELS:   --  The coronary circulation is right dominant.  --  Left main: The vessel was medium sized. Angiography showed minor luminal irregularities.  --  Proximal LAD: There was a 30 % stenosis.  --  Mid LAD: There was a 40 % stenosis. It appears amenable to percutaneous intervention.  --  Circumflex: The vessel was medium to large sized. Angiography showed mild atherosclerosis. There were two major obtuse marginals.  --  RCA: The vessel was small to medium sized. Angiography showed moderate atherosclerosis.  --  Mid RCA: There was a 50 % stenosis. It appears amenable to percutaneous intervention.  HTN  CKD  Anemia  Vit B12 deficient and gets injections      I have reviewed all pertinent patient data including but not limited to:    Lab Results   Component Value Date    CREATININE 1.14 03/02/2025     Lab Results   Component Value Date    K 3.5 03/02/2025     Lab Results   Component Value " Date    HGBA1C 5.4 08/13/2022     Lab Results   Component Value Date    TCN0PACSXPGM 3.876 09/19/2024    TSH 1.93 09/14/2022     Lab Results   Component Value Date    LDLCALC 50 08/30/2023     Lab Results   Component Value Date     (H) 08/30/2023      Lab Results   Component Value Date    NTBNP 778 (H) 08/09/2021        Serum immunofixation shows no monoclonal immunoglobulins.   UPEP: no M spike  FLA ratio 2.2 - minimally elevated (normal in CKD)  Remote RF and anti-CCP both wnl    TODAY'S PLAN:     07/11/25  Warm, near euvolemic, chronic LE edema, L>R  She reports increased fatigue and CUEVAS, worse LLE pain  No dizziness, cp, unchanged LE edema  Wt decreasing at home she says  BP acceptable    Cw BP monitoring    Cw monitoring her Chronic heavy vpacing burden    Check new echo given Sx progression    Cw aldactone 25 qd  On lasix in remote past, not since 1/10/2025  cw losartan 50 bid  Cw jardiance 10 qd  cw imdur 30 qd    Planned for crestor 20 qd, Rx list shows still has 5 mg qd  No past H/O intolerance reported per pt    On nebivolol 5 qd and verapamil 240 qd since before we met    On AC    Follow up:  With me in 6 months or sooner if symptoms evolve.  In addition to follow up with their other medical providers    Key info from my prior notes:    Heme suggests abnl FLA ratio related to renal dz and advised renal cx  Appreciate heme recs    No recent PPM interrogations available to me now  Note Prior heavy vpacing burden    Reduce lasix 40 qd to 20 qd now  Advised copious hydration    Heme referral for elevated FLA ratio  Complicated by DARRIUS on possible CKD (though no clear Hx of CKD)  also in setting of her cardiac LVH and (minimal) concern for infiltrative CM, see prior comments    FLA ratio would be abnl if no CKD  Note She does not have significant CKD historically  Given workup to date, Lower suspicion but some mixed signals of infiltrative CM  Workup so far points away from aTTR CA  AL less likely given  clinical course, though possible and FLA ratio not well explained as of yet  cannot check CMR 2/2 PPM model  Most past ECGs vpaced  No recent strain imaging    Serum immunofixation shows no monoclonal immunoglobulins.   UPEP: no M spike  FLA ratio 2.2 and 2.7 on repeat - minimally elevated absolute numbers and wnl if has CKD. These FLA ratios done when had DARRIUS  pyp without evidence of cardiac amyloidosis    Dopplers neg for DVT    HR in 70s now, heavy V pacing burden, minimal room for liberalization it seems  Fu EP  Extensive vpacing - monitor symptoms after diuresis and BP control, recheck echo and consider med change vs biv upgrade pending course (complex in her given age, frailty, past PPM lead extraction issues)    I am meeting patient for the first time today  Warm, mildly vol up on exam  Lasix recently doubled to lasix 20 bid from qd - within past week - making more urine, she feels slightly better now  Her main complaint is fatigue and SOB, sometimes profound, worse over past year since CVA and also after covid vaccine she says  High RV pacing noted  Office and her home SBP consistently in 130-140s, she keeps good log at home    Her symptoms are likely multifactorial    Limited role for RHC/EMB at present time  May reconsider pending Tx as above    Her echo and clinical Hx suggests possible infiltrative CM such as cardiac amyloidosis  Of course she certainly has chronic HTN that can explain similar findings  Will pursue CA workup now - AL screen and pyp ordered  We discussed possible additional questions these tests may raise and potential need for more downstream tests if this leads us to confusion    PPM,  ICD , CRT (if applicable):  Interrogation:  5/2025  MDT-SINGLE CHAMBER PPM / NOT MRI CONDITIONAL   CARELINK TRANSMISSION: BATTERY VOLTAGE ADEQUATE. (8.1 YRS)  75%. ALL AVAILABLE LEAD PARAMETERS WITHIN NORMAL LIMITS. NO SIGNIFICANT HIGH RATE EPISODES. SINGLE PVC COUNT 38.5/HOUR; PVC RUNS 6.7/HOUR. NORMAL  DEVICE FUNCTION     Studies:  I have reviewed all pertinent patient data/labs/imaging where available, including but not limited to the below studies. Selected results may be displayed here but comprehensive listing is omitted for note clarity and can be found in the epic chart.    ECG.    Echo.    Stress.    Cath.    HPI:   83 y.o. female Hx per chart p/w HF fu.  No new CP/SOB/dizziness/palpitations/syncope.  No new fatigue.  No new unintentional weight changes.  No new leg swelling, PND, pillow orthopnea.  No new fevers, chills, cough, nausea, vomiting, diarrhea, dysuria.      Interval History:   As noted in 'plan' section above and prior epic chart notes.    No new CP/SOB/dizziness/palpitations/syncope.  No new fatigue.  No new unintentional weight changes.  No new leg swelling, PND, pillow orthopnea.  No new fevers, chills, cough, nausea, vomiting, diarrhea, dysuria.    Past Medical History:   Diagnosis Date    A-fib (HCC)     Anemia     Arthritis     BiPAP (biphasic positive airway pressure) dependence     Breast pain, right     Chest pain on breathing     cleared by cardio on 1/10/25    CHF (congestive heart failure) (HCC)     CKD (chronic kidney disease)     Colon polyp     Cough     Diverticulosis     Edema of left lower extremity     Encounter for screening colonoscopy     Frequent falls     H/O sick sinus syndrome     Heart murmur     High cholesterol     History of atrial fibrillation     on Pradaxa    History of CVA (cerebrovascular accident)     Left- weakness    History of weight loss     Report loose fitting clothes. Weight stable (3lbs difference). Last colo showed small tubular adenoma x2. Breast biopsy last showed fibrocystic changes. TSH wnl. Will check cbc, bmp, spep.        Hx of long term use of blood thinners     Hypertension     Irregular heart beat     Left foot drop     Left hemiparesis (HCC)     OA (osteoarthritis)     RICH (obstructive sleep apnea)     Pacemaker     Preop examination      Shortness of breath     Urinary retention     Viral bronchitis     Walker as ambulation aid     Wears glasses      Patient Active Problem List    Diagnosis Date Noted    Ambulatory dysfunction 06/04/2025    Assistance needed with transportation 05/01/2025    Status post dilation and curettage 01/27/2025    Chest pain 01/13/2025    Difficulty using biphasic positive airway pressure (BiPAP) machine 12/22/2024    CKD stage G3b/A1, GFR 30-44 and albumin creatinine ratio <30 mg/g (HCC) 12/10/2024    Left foot drop 11/11/2024    Vaginal bleeding 10/31/2024    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 ([facility]) 09/10/2024    Benign hypertension with chronic kidney disease, stage III ([facility]) 09/10/2024    Metabolic alkalosis 09/10/2024    Onychomycosis 07/08/2024    At high risk for falls 03/13/2024    (HFpEF) heart failure with preserved ejection fraction ([facility]) 03/13/2024    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    Dyspnea 08/07/2023    RICH (obstructive sleep apnea)     Pulmonary hypertension ([facility]) 11/23/2022    Belching 10/05/2022    Transient neurological symptoms 09/08/2022    Thrush 09/06/2022    At risk for delirium 09/06/2022    Valvular heart disease 08/20/2022    CAD (coronary artery disease) 08/20/2022    Urinary retention 08/20/2022    Adjustment disorder with mixed emotional features 08/20/2022    Anemia 08/19/2022    At risk for venous thromboembolism (VTE) 08/19/2022    Spastic hemiparesis of left dominant side as late effect of cerebral infarction ([facility]) 08/19/2022    History of CVA (cerebrovascular accident) 08/12/2022    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 ([facility]) 08/12/2022    Abnormal nuclear stress test 08/20/2021    Diverticulosis of colon 05/20/2019    Pacemaker 02/26/2018    Tubulovillous adenoma 02/09/2018    Gastroesophageal reflux disease without esophagitis 02/09/2018    Essential hypertension 12/08/2017    Hyperlipidemia LDL goal  <100 12/08/2017    Stage 3b chronic kidney disease (HCC) 12/08/2017    Osteoarthritis of both wrists 12/08/2017    Chronic atrial fibrillation (HCC) 12/08/2017    Cavus deformity of foot 06/27/2017    Hallux abducto valgus, bilateral 06/27/2017    Lipoma of right upper extremity 05/17/2017    Pain due to onychomycosis of toenails of both feet 01/20/2017    Allergic rhinitis due to pollen 10/12/2015    Midline cystocele 12/23/2014    Osteoarthritis of hip 07/17/2014    B12 deficiency 12/06/2013    Osteopenia 11/15/2013    Left renal artery stenosis (HCC) 08/27/2013    Left atrial enlargement 08/27/2013       ROS:  10 point ROS negative except as specified in HPI/interval history    Allergies   Allergen Reactions    Other Itching     Bandaids    Tape [Medical Tape] Itching       Current Outpatient Medications   Medication Instructions    acetaminophen (TYLENOL) 650 mg, Oral, Every 6 hours PRN    acetaminophen (TYLENOL) 500 mg, Every 6 hours PRN    acetaminophen (TYLENOL) 650 mg, Oral, Every 6 hours PRN    ammonium lactate (LAC-HYDRIN) 12 % cream Topical, As needed    Blood Pressure Monitoring (Blood Pressure Cuff) MISC Does not apply, Every other day, For HTN    cyanocobalamin 1,000 mcg, Intramuscular, Every 30 days    dabigatran etexilate (PRADAXA) 75 mg, Oral, 2 times daily, SB JUAN ANTONIO CAPSULA POR VIA ORAL DOS VECES AL KATRINA    gabapentin (NEURONTIN) 100 mg, Oral, 3 times daily PRN    Incontinence Supply Disposable (RA Adult Wipes) MISC Does not apply, 4 times daily    isosorbide mononitrate (IMDUR) 30 mg, Oral, Daily    Jardiance 10 mg, Oral, Every morning    losartan (COZAAR) 50 mg, Oral, 2 times daily    miSOPROStol (CYTOTEC) 400 mcg, Oral, Once, Please take medication 4 hours before your surgery with water    nebivolol (BYSTOLIC) 5 mg, Oral, Daily    ondansetron (ZOFRAN-ODT) 4 mg, Oral, Every 6 hours PRN    rosuvastatin (CRESTOR) 5 mg, Daily    spironolactone (ALDACTONE) 25 mg, Oral, Daily    verapamil (CALAN-SR)  240 mg, Oral, Daily at bedtime        Social History     Socioeconomic History    Marital status:      Spouse name: Not on file    Number of children: Not on file    Years of education: Not on file    Highest education level: Not on file   Occupational History    Occupation: retired   Tobacco Use    Smoking status: Never    Smokeless tobacco: Never   Vaping Use    Vaping status: Never Used   Substance and Sexual Activity    Alcohol use: Never    Drug use: No    Sexual activity: Not Currently     Comment:    Other Topics Concern    Not on file   Social History Narrative    Housing, household, and economic circumstances      Social Drivers of Health     Financial Resource Strain: Low Risk  (1/13/2025)    Overall Financial Resource Strain (CARDIA)     Difficulty of Paying Living Expenses: Not hard at all   Food Insecurity: No Food Insecurity (1/13/2025)    Nursing - Inadequate Food Risk Classification     Worried About Running Out of Food in the Last Year: Never true     Ran Out of Food in the Last Year: Never true     Ran Out of Food in the Last Year: Not on file   Transportation Needs: No Transportation Needs (1/13/2025)    PRAPARE - Transportation     Lack of Transportation (Medical): No     Lack of Transportation (Non-Medical): No   Physical Activity: Inactive (4/14/2021)    Exercise Vital Sign     Days of Exercise per Week: 0 days     Minutes of Exercise per Session: 0 min   Stress: No Stress Concern Present (4/14/2021)    [employer] - Occupational Stress Questionnaire     Feeling of Stress : Not at all   Social Connections: Moderately Isolated (4/14/2021)    Social Connection and Isolation Panel     Frequency of Communication with Friends and Family: More than three times a week     Frequency of Social Gatherings with Friends and Family: More than three times a week     Attends Orthodox Services: More than 4 times per year     Active Member of Clubs or Organizations:  "No     Attends Club or Organization Meetings: Never     Marital Status:    Intimate Partner Violence: Not At Risk (4/14/2021)    Humiliation, Afraid, Rape, and Kick questionnaire     Fear of Current or Ex-Partner: No     Emotionally Abused: No     Physically Abused: No     Sexually Abused: No   Housing Stability: Low Risk  (1/13/2025)    Housing Stability Vital Sign     Unable to Pay for Housing in the Last Year: No     Number of Times Moved in the Last Year: 1     Homeless in the Last Year: No       Family History   Problem Relation Name Age of Onset    Diabetes Mother      No Known Problems Father      Breast cancer Sister  53    No Known Problems Sister Pat     No Known Problems Sister Zohreh     No Known Problems Sister shae     No Known Problems Daughter      No Known Problems Maternal Grandmother      No Known Problems Maternal Grandfather      No Known Problems Paternal Grandmother      No Known Problems Paternal Grandfather      No Known Problems Brother      No Known Problems Brother      No Known Problems Son      No Known Problems Paternal Aunt      No Known Problems Paternal Aunt      No Known Problems Paternal Aunt      No Known Problems Paternal Aunt         Physical Exam:  Vitals:    07/11/25 1032   BP: 134/72   BP Location: Left arm   Patient Position: Sitting   Cuff Size: Standard   Pulse: 80   SpO2: 98%   Weight: 62.3 kg (137 lb 4.8 oz)   Height: 5' 3\" (1.6 m)       Constitutional: NAD, non toxic  Ears/nose/mouth/throat: atraumatic  CV: RRR, no JVD  Resp: ctabl  GI: Soft, NTND  MSK: no swollen joints in exposed areas  Extr: trace LE edema L>R (since CVA), warm LE  Pysche: Normal affect  Neuro: appropriate in conversation  Skin: dry and intact in exposed areas    Labs & Results:    Lab Results   Component Value Date    SODIUM 138 03/02/2025    K 3.5 03/02/2025     03/02/2025    CO2 24 03/02/2025    BUN 17 03/02/2025    CREATININE 1.14 03/02/2025    GLUC 141 (H) 03/02/2025    CALCIUM " 10.0 03/02/2025     Lab Results   Component Value Date    WBC 18.27 (H) 03/02/2025    HGB 14.4 03/02/2025    HCT 45.2 03/02/2025    MCV 96 03/02/2025     03/02/2025     Lab Results   Component Value Date     (H) 08/30/2023      Lab Results   Component Value Date    CHOLESTEROL 118 08/30/2023    CHOLESTEROL 100 08/13/2022    CHOLESTEROL 149 08/16/2021     Lab Results   Component Value Date    HDL 52 08/30/2023    HDL 48 (L) 08/13/2022    HDL 62 08/16/2021     Lab Results   Component Value Date    TRIG 78 08/30/2023    TRIG 71 08/13/2022    TRIG 101 08/16/2021     Lab Results   Component Value Date    NONHDLC 52 08/13/2022    NONHDLC 87 08/16/2021    NONHDLC 89 08/18/2020       Counseling / Coordination of Care  Greater than 50% of total time was spent with the patient and / or family counseling and / or coordination of care.  We discussed diagnoses, most recent studies, tests and any changes in treatment plan.    Thank you for the opportunity to participate in the care of this patient.    Shayne Herrmann MD  Attending Physician  Advanced Heart Failure and Transplant Cardiology  [facility]

## 2025-07-11 ENCOUNTER — PATIENT OUTREACH (OUTPATIENT)
Dept: INTERNAL MEDICINE CLINIC | Facility: CLINIC | Age: 85
End: 2025-07-11

## 2025-07-11 ENCOUNTER — OFFICE VISIT (OUTPATIENT)
Dept: CARDIOLOGY CLINIC | Facility: CLINIC | Age: 85
End: 2025-07-11
Payer: MEDICARE

## 2025-07-11 VITALS
DIASTOLIC BLOOD PRESSURE: 72 MMHG | BODY MASS INDEX: 24.33 KG/M2 | HEART RATE: 80 BPM | WEIGHT: 137.3 LBS | SYSTOLIC BLOOD PRESSURE: 134 MMHG | OXYGEN SATURATION: 98 % | HEIGHT: 63 IN

## 2025-07-11 DIAGNOSIS — N18.32 CKD STAGE G3B/A1, GFR 30-44 AND ALBUMIN CREATININE RATIO <30 MG/G (HCC): ICD-10-CM

## 2025-07-11 DIAGNOSIS — I50.32 CHRONIC HEART FAILURE WITH PRESERVED EJECTION FRACTION (HCC): Primary | ICD-10-CM

## 2025-07-11 DIAGNOSIS — Z95.0 PRESENCE OF PERMANENT CARDIAC PACEMAKER: ICD-10-CM

## 2025-07-11 DIAGNOSIS — I63.30 CEREBRAL THROMBOSIS WITH CEREBRAL INFARCTION (HCC): ICD-10-CM

## 2025-07-11 DIAGNOSIS — I69.352 SPASTIC HEMIPARESIS OF LEFT DOMINANT SIDE AS LATE EFFECT OF CEREBRAL INFARCTION (HCC): Primary | ICD-10-CM

## 2025-07-11 PROCEDURE — 99214 OFFICE O/P EST MOD 30 MIN: CPT | Performed by: STUDENT IN AN ORGANIZED HEALTH CARE EDUCATION/TRAINING PROGRAM

## 2025-07-11 NOTE — PROGRESS NOTES
Outpatient Care Management Note:    Re: chronic care management     Sent inbasket message yesterday to clerical team  to follow up on medical necessity form for lift chair. I received inbasket message today that they have not received the form. I called [employer]ity[employer] at [phone] and requested to speak with Kimberli. I was told she is out till 7/16. They will look if form is available and re-fax to office and provided fax number [phone]. If not will address with Kimberli when she is back.     Will make clerical team aware.

## 2025-07-14 ENCOUNTER — TELEPHONE (OUTPATIENT)
Dept: INTERNAL MEDICINE CLINIC | Facility: CLINIC | Age: 85
End: 2025-07-14

## 2025-07-14 NOTE — TELEPHONE ENCOUNTER
Folder Color-Blue     Name of Form-CMN -[id number] [employer]      Form to be filled out by-Stas     Form to be Faxed [phone]     Patient made aware of 10 business day policy.

## 2025-07-17 ENCOUNTER — OFFICE VISIT (OUTPATIENT)
Dept: INTERNAL MEDICINE CLINIC | Facility: CLINIC | Age: 85
End: 2025-07-17

## 2025-07-17 VITALS
HEART RATE: 84 BPM | BODY MASS INDEX: 24.1 KG/M2 | SYSTOLIC BLOOD PRESSURE: 155 MMHG | TEMPERATURE: 98.4 F | DIASTOLIC BLOOD PRESSURE: 76 MMHG | WEIGHT: 136 LBS | HEIGHT: 63 IN

## 2025-07-17 DIAGNOSIS — Z71.89 COUNSELING REGARDING ADVANCED CARE PLANNING AND GOALS OF CARE: Primary | ICD-10-CM

## 2025-07-17 DIAGNOSIS — M21.372 LEFT FOOT DROP: ICD-10-CM

## 2025-07-17 PROCEDURE — 3077F SYST BP >= 140 MM HG: CPT | Performed by: STUDENT IN AN ORGANIZED HEALTH CARE EDUCATION/TRAINING PROGRAM

## 2025-07-17 PROCEDURE — 99214 OFFICE O/P EST MOD 30 MIN: CPT | Performed by: STUDENT IN AN ORGANIZED HEALTH CARE EDUCATION/TRAINING PROGRAM

## 2025-07-17 PROCEDURE — 3078F DIAST BP <80 MM HG: CPT | Performed by: STUDENT IN AN ORGANIZED HEALTH CARE EDUCATION/TRAINING PROGRAM

## 2025-07-17 PROCEDURE — G2211 COMPLEX E/M VISIT ADD ON: HCPCS | Performed by: STUDENT IN AN ORGANIZED HEALTH CARE EDUCATION/TRAINING PROGRAM

## 2025-07-17 NOTE — ASSESSMENT & PLAN NOTE
Related to CVA with residual left-sided deficits. Needs new referrals for PT and podiatry.   Orders:    Ambulatory Referral to Physical Therapy; Future    Ambulatory Referral to Podiatry; Future

## 2025-07-17 NOTE — ASSESSMENT & PLAN NOTE
HCR documentation to be scanned into the chart. Please see media section for document and separate ACP note for further details.

## 2025-07-17 NOTE — ACP (ADVANCE CARE PLANNING)
Advanced Care Planning Progress Note    Serious Illness Conversation    1. What is your understanding now of where you are with your illness?  Prognostic Understanding: appropriate understanding of prognosis  Ade is aware of her limitations as a result of her CVA. She is no longer able to write clearly -- which her son helps her with -- as she is left-handed and has significant dyskinesia. She describes her ongoing LLE pain in her foot with chronic swelling. She appropriately expresses limitation in walking that require her to use a walker. Appreciative that she needs help at home and would not be able to live by herself.      2. How much information about what is likely to be ahead with your illness would you like to have?  Information: patient wants to be fully informed  Patient wants to be informed but will consistently qualify her statement that she feels comfortable knowing everything because she wants every part of her care to be discussed with her son and daughter-in-law.      3. What did you (clinician) communicate to the patient?  Prognostic Communication: Uncertain - It can be difficult to predict what will happen with your illness. I hope you will continue to live well for a long time but I’m worried that you could get sick quickly, and I think it is important to prepare for that possibility.     4. If your health situation worsens, what are your most important goals?  Goals: be at home, be physically comfortable, have my medical decisions respected     5. What are the biggest fears and worries about the future and your health?  Fears/Worries: loss of mobility, pain, other symptoms, loss of control     6. What abilities are so critical to your life that you cannot imagine living without them?  Unacceptable Function: being in chronic severe pain, being unable to communicate effectively, not being able to care for myself, including toileting and feeding, being unable to interact with others, not being  myself     7. What gives you strength as you think about the future with your illness?  Support from son, Donal, and his wife (SANJU). Having caregivers at home.      8. If you become sicker, how much are you willing to go through for the possibility of gaining more time?  Be in the hospital: Yes    Be in the ICU: Yes Live in a nursing home: Yes    Undergo aggressive test and/or procedures: No   Ventilator/dialysis/feeding tube - comfortable with son making this decision for her; would not want any of these things long-term, including dialysis, especially if she would not get better.   9. How much does your proxy and family know about your priorities and wishes?  SonDonal, present for conversation today         How does this plan sound to you? I will do everything I can to help you through this.  Patient verbalized understanding of the plan, Patient wants to finish this discussion at a later time     I have spent 30 minutes speaking with my patient on advanced care planning today or during this visit     Advanced directives  Five Wishes: Patient does not have Five Wishes- would like information       I spoke with Ade and her son, Donal, today at her office visit. We filled out the HCR paperwork together, engaging in discussion and question/answer for each topic. Both patient and son expressed understanding and were appreciative of conversation. Aed repeatedly expressed gratitude for her son and SANJU's care for her. Being close to them is a priority for her. She currently has caregivers during the day and evening, which has helped her be able to remain at home. We discussed filling out a MoCA at today's visit, which was deferred in the setting of Ade doing well managing her medications given that she has assistance with everything else at home. We will revisit this in the future. It is in my professional opinion that Ade has capacity at today's visit to objectively state her wishes and fill out the HCR  form; however, given residual deficit from CVA she is unable to adequately write other than her initials.     Tessa Singleton, DO   Internal Medicine Residency, PGY-3

## 2025-07-17 NOTE — PROGRESS NOTES
Name: Ade Moon      : 1940      MRN: [id number]  Encounter Provider: Tessa Singleton DO  Encounter Date: 2025   Encounter department: [facility] [place]  :  Assessment & Plan  Counseling regarding advanced care planning and goals of care  HCR documentation to be scanned into the chart. Please see media section for document and separate ACP note for further details.        Left foot drop  Related to CVA with residual left-sided deficits. Needs new referrals for PT and podiatry.   Orders:    Ambulatory Referral to Physical Therapy; Future    Ambulatory Referral to Podiatry; Future           History of Present Illness   Patient is 83yo female with PMH AF on AC, HFpEF, PH Group II, CVA with residual left-sided deficits including dyskinesis of left hand, left foot drop, and chronic LLE edema. Also has TBS s/p S-PPM, nonobstructive CAD, HTN, CKD, anemia, vitamin B12 deficiency s/p IM supplementation. She presents today with her son to discuss goals of care and advanced care planning. Currently patient lives at home by herself but has a daytime and overnight caretaker who helps her with ADLS with exception to medications. Patient feels she is able to handle medications on her own because she has bubble-pack system. Happy and safe at home, ambulates with walker. No other acute complaints at today's visit.       Review of Systems   Constitutional:  Positive for fatigue. Negative for chills and fever.   HENT:  Negative for ear pain and sore throat.    Eyes:  Negative for pain and visual disturbance.   Respiratory:  Negative for cough and shortness of breath.    Cardiovascular:  Negative for chest pain and palpitations.   Gastrointestinal:  Negative for abdominal pain and vomiting.   Genitourinary:  Negative for dysuria and hematuria.   Musculoskeletal:  Negative for arthralgias and back pain.   Skin:  Negative for color change and rash.   Neurological:  Negative for seizures and  "syncope.   All other systems reviewed and are negative.      Objective   /76 (BP Location: Right arm, Patient Position: Sitting, Cuff Size: Adult)   Pulse 84   Temp 98.4 °F (36.9 °C) (Temporal)   Ht 5' 3\" (1.6 m)   Wt 61.7 kg (136 lb)   BMI 24.09 kg/m²      Physical Exam  Vitals and nursing note reviewed.   Constitutional:       General: She is not in acute distress.     Appearance: She is well-developed.   HENT:      Head: Normocephalic and atraumatic.     Eyes:      Conjunctiva/sclera: Conjunctivae normal.       Cardiovascular:      Rate and Rhythm: Normal rate and regular rhythm.      Heart sounds: No murmur heard.  Pulmonary:      Effort: Pulmonary effort is normal. No respiratory distress.      Breath sounds: Normal breath sounds.   Abdominal:      Palpations: Abdomen is soft.      Tenderness: There is no abdominal tenderness.     Musculoskeletal:         General: No swelling.      Cervical back: Neck supple.      Comments: LLE swelling to ankle, pitting     Skin:     General: Skin is warm and dry.      Capillary Refill: Capillary refill takes less than 2 seconds.     Neurological:      Mental Status: She is alert and oriented to person, place, and time.      Comments: Left foot drop   Psychiatric:         Mood and Affect: Mood normal.       Tessa Singleton DO  [facility]  Internal Medicine Residency, PGY-3  "

## 2025-07-27 ENCOUNTER — APPOINTMENT (EMERGENCY)
Dept: RADIOLOGY | Facility: HOSPITAL | Age: 85
End: 2025-07-27
Payer: MEDICARE

## 2025-07-27 ENCOUNTER — HOSPITAL ENCOUNTER (EMERGENCY)
Facility: HOSPITAL | Age: 85
Discharge: HOME | End: 2025-07-27
Attending: EMERGENCY MEDICINE | Admitting: EMERGENCY MEDICINE
Payer: MEDICARE

## 2025-07-27 VITALS
SYSTOLIC BLOOD PRESSURE: 132 MMHG | RESPIRATION RATE: 20 BRPM | TEMPERATURE: 98.3 F | DIASTOLIC BLOOD PRESSURE: 60 MMHG | HEART RATE: 81 BPM | OXYGEN SATURATION: 97 %

## 2025-07-27 DIAGNOSIS — R10.9 ABDOMINAL PAIN: ICD-10-CM

## 2025-07-27 DIAGNOSIS — R11.2 NAUSEA & VOMITING: Primary | ICD-10-CM

## 2025-07-27 LAB
2HR DELTA HS TROPONIN: 1 NG/L
ALBUMIN SERPL BCG-MCNC: 3.9 G/DL (ref 3.5–5)
ALP SERPL-CCNC: 62 U/L (ref 34–104)
ALT SERPL W P-5'-P-CCNC: 7 U/L (ref 7–52)
ANION GAP SERPL CALCULATED.3IONS-SCNC: 6 MMOL/L (ref 4–13)
AST SERPL W P-5'-P-CCNC: 13 U/L (ref 13–39)
ATRIAL RATE: 53 BPM
BACTERIA UR QL AUTO: ABNORMAL /HPF
BASOPHILS # BLD AUTO: 0.04 THOUSANDS/ÂΜL (ref 0–0.1)
BASOPHILS NFR BLD AUTO: 0 % (ref 0–1)
BILIRUB SERPL-MCNC: 0.56 MG/DL (ref 0.2–1)
BILIRUB UR QL STRIP: NEGATIVE
BUN SERPL-MCNC: 29 MG/DL (ref 5–25)
CALCIUM SERPL-MCNC: 9.1 MG/DL (ref 8.4–10.2)
CARDIAC TROPONIN I PNL SERPL HS: 4 NG/L (ref ?–50)
CARDIAC TROPONIN I PNL SERPL HS: 5 NG/L (ref ?–50)
CHLORIDE SERPL-SCNC: 106 MMOL/L (ref 96–108)
CLARITY UR: CLEAR
CO2 SERPL-SCNC: 23 MMOL/L (ref 21–32)
COLOR UR: ABNORMAL
CREAT SERPL-MCNC: 1.19 MG/DL (ref 0.6–1.3)
EOSINOPHIL # BLD AUTO: 0.04 THOUSAND/ÂΜL (ref 0–0.61)
EOSINOPHIL NFR BLD AUTO: 0 % (ref 0–6)
ERYTHROCYTE [DISTWIDTH] IN BLOOD BY AUTOMATED COUNT: 13.7 % (ref 11.6–15.1)
GFR SERPL CREATININE-BSD FRML MDRD: 42 ML/MIN/1.73SQ M
GLUCOSE SERPL-MCNC: 96 MG/DL (ref 65–140)
GLUCOSE UR STRIP-MCNC: NEGATIVE MG/DL
HCT VFR BLD AUTO: 37.8 % (ref 34.8–46.1)
HGB BLD-MCNC: 12.2 G/DL (ref 11.5–15.4)
HGB UR QL STRIP.AUTO: ABNORMAL
IMM GRANULOCYTES # BLD AUTO: 0.09 THOUSAND/UL (ref 0–0.2)
IMM GRANULOCYTES NFR BLD AUTO: 1 % (ref 0–2)
KETONES UR STRIP-MCNC: NEGATIVE MG/DL
LEUKOCYTE ESTERASE UR QL STRIP: ABNORMAL
LIPASE SERPL-CCNC: 28 U/L (ref 11–82)
LYMPHOCYTES # BLD AUTO: 0.55 THOUSANDS/ÂΜL (ref 0.6–4.47)
LYMPHOCYTES NFR BLD AUTO: 4 % (ref 14–44)
MCH RBC QN AUTO: 31.4 PG (ref 26.8–34.3)
MCHC RBC AUTO-ENTMCNC: 32.3 G/DL (ref 31.4–37.4)
MCV RBC AUTO: 97 FL (ref 82–98)
MONOCYTES # BLD AUTO: 1.22 THOUSAND/ÂΜL (ref 0.17–1.22)
MONOCYTES NFR BLD AUTO: 8 % (ref 4–12)
NEUTROPHILS # BLD AUTO: 12.95 THOUSANDS/ÂΜL (ref 1.85–7.62)
NEUTS SEG NFR BLD AUTO: 87 % (ref 43–75)
NITRITE UR QL STRIP: NEGATIVE
NON-SQ EPI CELLS URNS QL MICRO: ABNORMAL /HPF
NRBC BLD AUTO-RTO: 0 /100 WBCS
PH UR STRIP.AUTO: 5.5 [PH]
PLATELET # BLD AUTO: 208 THOUSANDS/UL (ref 149–390)
PMV BLD AUTO: 10.7 FL (ref 8.9–12.7)
POTASSIUM SERPL-SCNC: 4.3 MMOL/L (ref 3.5–5.3)
PROT SERPL-MCNC: 7.6 G/DL (ref 6.4–8.4)
PROT UR STRIP-MCNC: ABNORMAL MG/DL
QRS AXIS: -33 DEGREES
QRSD INTERVAL: 96 MS
QT INTERVAL: 432 MS
QTC INTERVAL: 452 MS
RBC # BLD AUTO: 3.88 MILLION/UL (ref 3.81–5.12)
RBC #/AREA URNS AUTO: ABNORMAL /HPF
SODIUM SERPL-SCNC: 135 MMOL/L (ref 135–147)
SP GR UR STRIP.AUTO: >=1.05 (ref 1–1.03)
T WAVE AXIS: -3 DEGREES
UROBILINOGEN UR STRIP-ACNC: <2 MG/DL
VENTRICULAR RATE: 66 BPM
WBC # BLD AUTO: 14.89 THOUSAND/UL (ref 4.31–10.16)
WBC #/AREA URNS AUTO: ABNORMAL /HPF

## 2025-07-27 PROCEDURE — 99284 EMERGENCY DEPT VISIT MOD MDM: CPT

## 2025-07-27 PROCEDURE — 96365 THER/PROPH/DIAG IV INF INIT: CPT

## 2025-07-27 PROCEDURE — 93005 ELECTROCARDIOGRAM TRACING: CPT

## 2025-07-27 PROCEDURE — 36415 COLL VENOUS BLD VENIPUNCTURE: CPT

## 2025-07-27 PROCEDURE — 93010 ELECTROCARDIOGRAM REPORT: CPT | Performed by: INTERNAL MEDICINE

## 2025-07-27 PROCEDURE — 81001 URINALYSIS AUTO W/SCOPE: CPT

## 2025-07-27 PROCEDURE — 96375 TX/PRO/DX INJ NEW DRUG ADDON: CPT

## 2025-07-27 PROCEDURE — 83690 ASSAY OF LIPASE: CPT

## 2025-07-27 PROCEDURE — 74177 CT ABD & PELVIS W/CONTRAST: CPT

## 2025-07-27 PROCEDURE — 99285 EMERGENCY DEPT VISIT HI MDM: CPT | Performed by: EMERGENCY MEDICINE

## 2025-07-27 PROCEDURE — 71046 X-RAY EXAM CHEST 2 VIEWS: CPT

## 2025-07-27 PROCEDURE — 80053 COMPREHEN METABOLIC PANEL: CPT

## 2025-07-27 PROCEDURE — 84484 ASSAY OF TROPONIN QUANT: CPT

## 2025-07-27 PROCEDURE — 85025 COMPLETE CBC W/AUTO DIFF WBC: CPT

## 2025-07-27 RX ORDER — ONDANSETRON 2 MG/ML
4 INJECTION INTRAMUSCULAR; INTRAVENOUS ONCE
Status: COMPLETED | OUTPATIENT
Start: 2025-07-27 | End: 2025-07-27

## 2025-07-27 RX ORDER — ONDANSETRON 4 MG/1
4 TABLET, ORALLY DISINTEGRATING ORAL EVERY 6 HOURS PRN
Qty: 20 TABLET | Refills: 0 | Status: SHIPPED | OUTPATIENT
Start: 2025-07-27 | End: 2025-08-01

## 2025-07-27 RX ORDER — ACETAMINOPHEN 10 MG/ML
1000 INJECTION, SOLUTION INTRAVENOUS ONCE
Status: COMPLETED | OUTPATIENT
Start: 2025-07-27 | End: 2025-07-27

## 2025-07-27 RX ADMIN — IOHEXOL 75 ML: 350 INJECTION, SOLUTION INTRAVENOUS at 15:44

## 2025-07-27 RX ADMIN — ONDANSETRON 4 MG: 2 INJECTION, SOLUTION INTRAMUSCULAR; INTRAVENOUS at 14:16

## 2025-07-27 RX ADMIN — ACETAMINOPHEN 1000 MG: 10 INJECTION INTRAVENOUS at 14:16

## 2025-07-28 ENCOUNTER — PATIENT OUTREACH (OUTPATIENT)
Dept: INTERNAL MEDICINE CLINIC | Facility: CLINIC | Age: 85
End: 2025-07-28

## 2025-07-28 DIAGNOSIS — I63.30 CEREBRAL THROMBOSIS WITH CEREBRAL INFARCTION (HCC): ICD-10-CM

## 2025-07-28 DIAGNOSIS — I69.352 SPASTIC HEMIPARESIS OF LEFT DOMINANT SIDE AS LATE EFFECT OF CEREBRAL INFARCTION (HCC): Primary | ICD-10-CM

## 2025-07-28 DIAGNOSIS — I50.32 CHRONIC HEART FAILURE WITH PRESERVED EJECTION FRACTION (HCC): ICD-10-CM

## 2025-07-29 NOTE — TELEPHONE ENCOUNTER
Folder Color-Blue     Name of Form-CMN -[id number]      Form to be filled out by-Stas     Form to be Faxed [phone]     Patient made aware of 10 business day policy.     PLEASE SEE PATIENT OUTREACH FROM 7/28    Form was reprinted. Questions 1 & 2 need to be changed in order for pt to get approval for chair.

## 2025-07-31 ENCOUNTER — HOSPITAL ENCOUNTER (OUTPATIENT)
Dept: NON INVASIVE DIAGNOSTICS | Facility: CLINIC | Age: 85
Discharge: HOME | End: 2025-07-31
Attending: STUDENT IN AN ORGANIZED HEALTH CARE EDUCATION/TRAINING PROGRAM
Payer: MEDICARE

## 2025-07-31 VITALS
SYSTOLIC BLOOD PRESSURE: 132 MMHG | BODY MASS INDEX: 24.1 KG/M2 | WEIGHT: 136 LBS | HEART RATE: 81 BPM | DIASTOLIC BLOOD PRESSURE: 60 MMHG | HEIGHT: 63 IN

## 2025-07-31 DIAGNOSIS — I10 ESSENTIAL HYPERTENSION: ICD-10-CM

## 2025-07-31 DIAGNOSIS — I50.32 CHRONIC HEART FAILURE WITH PRESERVED EJECTION FRACTION (HCC): ICD-10-CM

## 2025-07-31 LAB
AORTIC ROOT: 2.7 CM
AORTIC VALVE MEAN VELOCITY: 12.7 M/S
ASCENDING AORTA: 3.5 CM
AV AREA BY CONTINUOUS VTI: 1.5 CM2
AV AREA PEAK VELOCITY: 1.3 CM2
AV LVOT MEAN GRADIENT: 2 MMHG
AV LVOT PEAK GRADIENT: 3 MMHG
AV MEAN PRESS GRAD SYS DOP V1V2: 7 MMHG
AV ORIFICE AREA US: 1.52 CM2
AV PEAK GRADIENT: 13 MMHG
AV REGURGITATION PRESSURE HALF TIME: 490 MS
AV VELOCITY RATIO: 0.54
AV VMAX SYS DOP: 1.79 M/S
BSA FOR ECHO PROCEDURE: 1.64 M2
DOP CALC AO VTI: 39.91 CM
DOP CALC LVOT AREA: 2.83 CM2
DOP CALC LVOT CARDIAC INDEX: 2.29 L/MIN/M2
DOP CALC LVOT CARDIAC OUTPUT: 3.76 L/MIN
DOP CALC LVOT DIAMETER: 1.9 CM
DOP CALC LVOT PEAK VEL VTI: 21.46 CM
DOP CALC LVOT PEAK VEL: 0.84 M/S
DOP CALC LVOT STROKE INDEX: 37.8 ML/M2
DOP CALC LVOT STROKE VOLUME: 62
FRACTIONAL SHORTENING: 30 (ref 28–44)
INTERVENTRICULAR SEPTUM IN DIASTOLE (PARASTERNAL SHORT AXIS VIEW): 1.5 CM
INTERVENTRICULAR SEPTUM: 1.5 CM (ref 0.6–1.1)
LAAS-AP2: 36.3 CM2
LAAS-AP4: 33.9 CM2
LEFT ATRIUM SIZE: 5 CM
LEFT ATRIUM VOLUME (MOD BIPLANE): 141 ML
LEFT ATRIUM VOLUME INDEX (MOD BIPLANE): 86 ML/M2
LEFT INTERNAL DIMENSION IN SYSTOLE: 2.6 CM (ref 2.1–4)
LEFT VENTRICLE DIASTOLIC VOLUME (MOD BIPLANE): 57 ML
LEFT VENTRICLE DIASTOLIC VOLUME INDEX (MOD BIPLANE): 34.8 ML/M2
LEFT VENTRICLE SYSTOLIC VOLUME (MOD BIPLANE): 17 ML
LEFT VENTRICLE SYSTOLIC VOLUME INDEX (MOD BIPLANE): 10.4 ML/M2
LEFT VENTRICULAR INTERNAL DIMENSION IN DIASTOLE: 3.7 CM (ref 3.5–6)
LEFT VENTRICULAR POSTERIOR WALL IN END DIASTOLE: 1.3 CM
LEFT VENTRICULAR STROKE VOLUME: 34 ML
LV EF BIPLANE MOD: 70 %
LV EF US.2D.A4C+ESTIMATED: 74 %
LVSV (TEICH): 34 ML
MV E'TISSUE VEL-SEP: 8 CM/S
RA PRESSURE ESTIMATED: 15 MMHG
RIGHT ATRIUM AREA SYSTOLE A4C: 17.5 CM2
RIGHT VENTRICLE ID DIMENSION: 3.1 CM
RV PSP: 53 MMHG
SL CV AV DECELERATION TIME RETROGRADE: 1688 MS
SL CV AV PEAK GRADIENT RETROGRADE: 66 MMHG
SL CV LEFT ATRIUM LENGTH A2C: 7.9 CM
SL CV LV EF: 75
SL CV PED ECHO LEFT VENTRICLE DIASTOLIC VOLUME (MOD BIPLANE) 2D: 60 ML
SL CV PED ECHO LEFT VENTRICLE SYSTOLIC VOLUME (MOD BIPLANE) 2D: 25 ML
TR MAX PG: 38 MMHG
TR PEAK VELOCITY: 3.1 M/S
TRICUSPID ANNULAR PLANE SYSTOLIC EXCURSION: 1.9 CM
TRICUSPID VALVE PEAK REGURGITATION VELOCITY: 3.07 M/S

## 2025-07-31 PROCEDURE — 93306 TTE W/DOPPLER COMPLETE: CPT

## 2025-07-31 PROCEDURE — 93306 TTE W/DOPPLER COMPLETE: CPT | Performed by: INTERNAL MEDICINE

## 2025-08-01 RX ORDER — VERAPAMIL HYDROCHLORIDE 240 MG/1
240 TABLET, FILM COATED, EXTENDED RELEASE ORAL
Qty: 90 TABLET | Refills: 3 | Status: SHIPPED | OUTPATIENT
Start: 2025-08-01

## 2025-08-05 ENCOUNTER — REMOTE DEVICE CLINIC VISIT (OUTPATIENT)
Dept: CARDIOLOGY CLINIC | Facility: CLINIC | Age: 85
End: 2025-08-05
Payer: MEDICARE

## 2025-08-05 ENCOUNTER — PATIENT OUTREACH (OUTPATIENT)
Dept: INTERNAL MEDICINE CLINIC | Facility: CLINIC | Age: 85
End: 2025-08-05

## 2025-08-05 DIAGNOSIS — I63.30 CEREBRAL THROMBOSIS WITH CEREBRAL INFARCTION (HCC): ICD-10-CM

## 2025-08-05 DIAGNOSIS — I49.5 SSS (SICK SINUS SYNDROME) (HCC): Primary | ICD-10-CM

## 2025-08-05 DIAGNOSIS — I69.352 SPASTIC HEMIPARESIS OF LEFT DOMINANT SIDE AS LATE EFFECT OF CEREBRAL INFARCTION (HCC): Primary | ICD-10-CM

## 2025-08-05 PROCEDURE — 93296 REM INTERROG EVL PM/IDS: CPT | Performed by: INTERNAL MEDICINE

## 2025-08-05 PROCEDURE — 93294 REM INTERROG EVL PM/LDLS PM: CPT | Performed by: INTERNAL MEDICINE

## 2025-08-11 ENCOUNTER — TELEPHONE (OUTPATIENT)
Dept: NEPHROLOGY | Facility: CLINIC | Age: 85
End: 2025-08-11

## 2025-08-12 ENCOUNTER — PATIENT OUTREACH (OUTPATIENT)
Dept: INTERNAL MEDICINE CLINIC | Facility: CLINIC | Age: 85
End: 2025-08-12

## 2025-08-18 ENCOUNTER — APPOINTMENT (OUTPATIENT)
Dept: LAB | Facility: CLINIC | Age: 85
End: 2025-08-18
Attending: STUDENT IN AN ORGANIZED HEALTH CARE EDUCATION/TRAINING PROGRAM
Payer: MEDICARE

## 2025-08-18 ENCOUNTER — PATIENT OUTREACH (OUTPATIENT)
Dept: INTERNAL MEDICINE CLINIC | Facility: CLINIC | Age: 85
End: 2025-08-18

## 2025-08-18 DIAGNOSIS — N18.32 CHRONIC KIDNEY DISEASE-MINERAL BONE DISORDER (CKD-MBD) WITH STAGE 3B CHRONIC KIDNEY DISEASE (HCC): ICD-10-CM

## 2025-08-18 DIAGNOSIS — N18.32 STAGE 3B CHRONIC KIDNEY DISEASE (HCC): Primary | ICD-10-CM

## 2025-08-18 DIAGNOSIS — I69.352 SPASTIC HEMIPARESIS OF LEFT DOMINANT SIDE AS LATE EFFECT OF CEREBRAL INFARCTION (HCC): ICD-10-CM

## 2025-08-18 DIAGNOSIS — E83.9 CHRONIC KIDNEY DISEASE-MINERAL BONE DISORDER (CKD-MBD) WITH STAGE 3B CHRONIC KIDNEY DISEASE (HCC): ICD-10-CM

## 2025-08-18 DIAGNOSIS — M89.9 CHRONIC KIDNEY DISEASE-MINERAL BONE DISORDER (CKD-MBD) WITH STAGE 3B CHRONIC KIDNEY DISEASE (HCC): ICD-10-CM

## 2025-08-18 DIAGNOSIS — I63.30 CEREBRAL THROMBOSIS WITH CEREBRAL INFARCTION (HCC): ICD-10-CM

## 2025-08-18 DIAGNOSIS — N18.32 CKD STAGE G3B/A1, GFR 30-44 AND ALBUMIN CREATININE RATIO <30 MG/G (HCC): ICD-10-CM

## 2025-08-18 LAB
ANION GAP SERPL CALCULATED.3IONS-SCNC: 9 MMOL/L (ref 4–13)
BACTERIA UR QL AUTO: ABNORMAL /HPF
BILIRUB UR QL STRIP: NEGATIVE
BUN SERPL-MCNC: 25 MG/DL (ref 5–25)
CALCIUM SERPL-MCNC: 9.5 MG/DL (ref 8.4–10.2)
CHLORIDE SERPL-SCNC: 106 MMOL/L (ref 96–108)
CLARITY UR: ABNORMAL
CO2 SERPL-SCNC: 23 MMOL/L (ref 21–32)
COLOR UR: YELLOW
CREAT SERPL-MCNC: 1.19 MG/DL (ref 0.6–1.3)
CREAT UR-MCNC: 130.2 MG/DL
GFR SERPL CREATININE-BSD FRML MDRD: 41 ML/MIN/1.73SQ M
GLUCOSE SERPL-MCNC: 87 MG/DL (ref 65–140)
GLUCOSE UR STRIP-MCNC: NEGATIVE MG/DL
HGB UR QL STRIP.AUTO: ABNORMAL
KETONES UR STRIP-MCNC: NEGATIVE MG/DL
LEUKOCYTE ESTERASE UR QL STRIP: ABNORMAL
MICROALBUMIN UR-MCNC: 34.2 MG/L
MICROALBUMIN/CREAT 24H UR: 26 MG/G CREATININE (ref 0–30)
NITRITE UR QL STRIP: NEGATIVE
NON-SQ EPI CELLS URNS QL MICRO: ABNORMAL /HPF
PH UR STRIP.AUTO: 6 [PH]
POTASSIUM SERPL-SCNC: 4.3 MMOL/L (ref 3.5–5.3)
PROT UR STRIP-MCNC: ABNORMAL MG/DL
PTH-INTACT SERPL-MCNC: 29.5 PG/ML (ref 12–88)
RBC #/AREA URNS AUTO: ABNORMAL /HPF
SODIUM SERPL-SCNC: 138 MMOL/L (ref 135–147)
SP GR UR STRIP.AUTO: 1.02 (ref 1–1.03)
UROBILINOGEN UR STRIP-ACNC: 2 MG/DL
WBC #/AREA URNS AUTO: ABNORMAL /HPF

## 2025-08-18 PROCEDURE — 81001 URINALYSIS AUTO W/SCOPE: CPT

## 2025-08-18 PROCEDURE — 36415 COLL VENOUS BLD VENIPUNCTURE: CPT

## 2025-08-18 PROCEDURE — 80048 BASIC METABOLIC PNL TOTAL CA: CPT

## 2025-08-18 PROCEDURE — 82043 UR ALBUMIN QUANTITATIVE: CPT

## 2025-08-18 PROCEDURE — 82570 ASSAY OF URINE CREATININE: CPT

## 2025-08-18 PROCEDURE — 83970 ASSAY OF PARATHORMONE: CPT

## 2025-08-19 ENCOUNTER — OFFICE VISIT (OUTPATIENT)
Dept: NEPHROLOGY | Facility: CLINIC | Age: 85
End: 2025-08-19
Payer: MEDICARE

## 2025-08-19 VITALS
HEART RATE: 73 BPM | HEIGHT: 63 IN | SYSTOLIC BLOOD PRESSURE: 124 MMHG | OXYGEN SATURATION: 98 % | BODY MASS INDEX: 24.09 KG/M2 | DIASTOLIC BLOOD PRESSURE: 62 MMHG

## 2025-08-19 DIAGNOSIS — N18.30 BENIGN HYPERTENSION WITH CHRONIC KIDNEY DISEASE, STAGE III (HCC): ICD-10-CM

## 2025-08-19 DIAGNOSIS — I12.9 BENIGN HYPERTENSION WITH CHRONIC KIDNEY DISEASE, STAGE III (HCC): ICD-10-CM

## 2025-08-19 DIAGNOSIS — E83.9 CHRONIC KIDNEY DISEASE-MINERAL BONE DISORDER (CKD-MBD) WITH STAGE 3A CHRONIC KIDNEY DISEASE (HCC): ICD-10-CM

## 2025-08-19 DIAGNOSIS — I50.32 CHRONIC HEART FAILURE WITH PRESERVED EJECTION FRACTION (HCC): Primary | ICD-10-CM

## 2025-08-19 DIAGNOSIS — N18.31 STAGE 3A CHRONIC KIDNEY DISEASE (HCC): ICD-10-CM

## 2025-08-19 DIAGNOSIS — M89.9 CHRONIC KIDNEY DISEASE-MINERAL BONE DISORDER (CKD-MBD) WITH STAGE 3A CHRONIC KIDNEY DISEASE (HCC): ICD-10-CM

## 2025-08-19 DIAGNOSIS — N18.31 CHRONIC KIDNEY DISEASE-MINERAL BONE DISORDER (CKD-MBD) WITH STAGE 3A CHRONIC KIDNEY DISEASE (HCC): ICD-10-CM

## 2025-08-19 PROCEDURE — G2211 COMPLEX E/M VISIT ADD ON: HCPCS | Performed by: STUDENT IN AN ORGANIZED HEALTH CARE EDUCATION/TRAINING PROGRAM

## 2025-08-19 PROCEDURE — 99214 OFFICE O/P EST MOD 30 MIN: CPT | Performed by: STUDENT IN AN ORGANIZED HEALTH CARE EDUCATION/TRAINING PROGRAM

## (undated) DEVICE — CHLORHEXIDINE 4PCT 4 OZ

## (undated) DEVICE — 3M™ IOBAN™ 2 ANTIMICROBIAL INCISE DRAPE 6640EZ: Brand: IOBAN™ 2

## (undated) DEVICE — LIGHT HANDLE COVER SLEEVE DISP BLUE STELLAR

## (undated) DEVICE — TROCAR: Brand: KII SLEEVE

## (undated) DEVICE — SUT PDS PLUS 1 CTX 36IN PDP371T

## (undated) DEVICE — PROXIMATE RELOADABLE LINEAR CUTTER WITH SAFETY LOCK-OUT, 100MM: Brand: PROXIMATE

## (undated) DEVICE — SUT VICRYL 3-0 SH 27 IN J416H

## (undated) DEVICE — CYSTO TUBING SINGLE IRRIGATION

## (undated) DEVICE — VISUALIZATION SYSTEM: Brand: CLEARIFY

## (undated) DEVICE — SUT VICRYL 0 REEL 54 IN J287G

## (undated) DEVICE — MEDI-VAC YANK SUCT HNDL W/TPRD BULBOUS TIP: Brand: CARDINAL HEALTH

## (undated) DEVICE — ACCESS PLATFORM FOR MINIMALLY INVASIVE SURGERY.: Brand: GELPORT® LAPAROSCOPIC  SYSTEM

## (undated) DEVICE — NEEDLE 25G X 1 1/2

## (undated) DEVICE — TUBING SMOKE EVAC W/FILTRATION DEVICE PLUMEPORT ACTIV

## (undated) DEVICE — GLOVE INDICATOR PI UNDERGLOVE SZ 8.5 BLUE

## (undated) DEVICE — ENSEAL X1 TISSUE SEALER, CURVED JAW, 37 CM SHAFT LENGTH: Brand: ENSEAL

## (undated) DEVICE — WORKING LENGTH 235CM, WORKING CHANNEL 2.8MM: Brand: RESOLUTION 360 CLIP

## (undated) DEVICE — BETHLEHEM MAJOR GENERAL PACK: Brand: CARDINAL HEALTH

## (undated) DEVICE — 40595 XL TRENDELENBURG POSITIONING KIT: Brand: 40595 XL TRENDELENBURG POSITIONING KIT

## (undated) DEVICE — IRRIG ENDO FLO TUBING

## (undated) DEVICE — GLOVE INDICATOR PI UNDERGLOVE SZ 6.5 BLUE

## (undated) DEVICE — SUT MONOCRYL 4-0 PS-2 18 IN Y496G

## (undated) DEVICE — TROCARS: Brand: KII® BALLOON BLUNT TIP SYSTEM

## (undated) DEVICE — INVIEW CLEAR LEGGINGS: Brand: CONVERTORS

## (undated) DEVICE — TROCAR: Brand: KII FIOS FIRST ENTRY

## (undated) DEVICE — PREMIUM DRY TRAY LF: Brand: MEDLINE INDUSTRIES, INC.

## (undated) DEVICE — 3M™ STERI-DRAPE™ UNDER BUTTOCKS DRAPE WITH POUCH 1084: Brand: STERI-DRAPE™

## (undated) DEVICE — PVC URETHRAL CATHETER: Brand: DOVER

## (undated) DEVICE — POOLE SUCTION HANDLE: Brand: CARDINAL HEALTH

## (undated) DEVICE — SUT VICRYL PLUS 0 UR-6 27IN VCP603H

## (undated) DEVICE — STRL ALLENTOWN HYSTEROSCOPY PK: Brand: CARDINAL HEALTH

## (undated) DEVICE — ANTIBACTERIAL UNDYED BRAIDED (POLYGLACTIN 910), SYNTHETIC ABSORBABLE SUTURE: Brand: COATED VICRYL

## (undated) DEVICE — INSUFLATION TUBING INSUFLOW (LEXION)

## (undated) DEVICE — GLOVE PI ULTRA TOUCH SZ 6

## (undated) DEVICE — PLUMEPEN PRO 10FT

## (undated) DEVICE — ADHESIVE SKIN HIGH VISCOSITY EXOFIN 1ML

## (undated) DEVICE — PROXIMATE LINEAR CUTTER RELOADS (STANDARD)-100MM: Brand: PROXIMATE

## (undated) DEVICE — INTENDED FOR TISSUE SEPARATION, AND OTHER PROCEDURES THAT REQUIRE A SHARP SURGICAL BLADE TO PUNCTURE OR CUT.: Brand: BARD-PARKER SAFETY BLADES SIZE 15, STERILE

## (undated) DEVICE — LUBRICANT JELLY SURGILUBE TUBE 2OZ FLIP TOP

## (undated) DEVICE — 3M™ IOBAN™ 2 ANTIMICROBIAL INCISE DRAPE 6650EZ: Brand: IOBAN™ 2

## (undated) DEVICE — TRAY FOLEY 16FR URIMETER SURESTEP

## (undated) DEVICE — GLOVE SRG BIOGEL 8

## (undated) DEVICE — TISSUE REMOVAL SYSTEM FLUID MANAGEMENT ACCESSORIES: Brand: SYMPHION